# Patient Record
Sex: FEMALE | Race: WHITE | NOT HISPANIC OR LATINO | Employment: FULL TIME | URBAN - NONMETROPOLITAN AREA
[De-identification: names, ages, dates, MRNs, and addresses within clinical notes are randomized per-mention and may not be internally consistent; named-entity substitution may affect disease eponyms.]

---

## 2017-01-02 ENCOUNTER — OFFICE VISIT - GICH (OUTPATIENT)
Dept: FAMILY MEDICINE | Facility: OTHER | Age: 52
End: 2017-01-02

## 2017-01-02 ENCOUNTER — HISTORY (OUTPATIENT)
Dept: FAMILY MEDICINE | Facility: OTHER | Age: 52
End: 2017-01-02

## 2017-01-02 DIAGNOSIS — E11.9 TYPE 2 DIABETES MELLITUS WITHOUT COMPLICATIONS (H): ICD-10-CM

## 2017-01-02 DIAGNOSIS — M51.369 OTHER INTERVERTEBRAL DISC DEGENERATION, LUMBAR REGION: ICD-10-CM

## 2017-01-02 DIAGNOSIS — M25.562 PAIN IN LEFT KNEE: ICD-10-CM

## 2017-01-02 DIAGNOSIS — M25.561 PAIN IN RIGHT KNEE: ICD-10-CM

## 2017-01-02 DIAGNOSIS — J45.40 MODERATE PERSISTENT ASTHMA, UNCOMPLICATED: ICD-10-CM

## 2017-01-02 DIAGNOSIS — Z23 ENCOUNTER FOR IMMUNIZATION: ICD-10-CM

## 2017-01-02 LAB
ESTIMATED AVERAGE GLUCOSE: 123 MG/DL
HEMOGLOBIN A1C MONITORING (POCT) - HISTORICAL: 5.9 % (ref 4–6.2)

## 2017-01-03 ENCOUNTER — AMBULATORY - GICH (OUTPATIENT)
Dept: UROLOGY | Facility: OTHER | Age: 52
End: 2017-01-03

## 2017-01-18 ENCOUNTER — AMBULATORY - GICH (OUTPATIENT)
Dept: FAMILY MEDICINE | Facility: OTHER | Age: 52
End: 2017-01-18

## 2017-01-18 ENCOUNTER — HISTORY (OUTPATIENT)
Dept: FAMILY MEDICINE | Facility: OTHER | Age: 52
End: 2017-01-18

## 2017-01-18 DIAGNOSIS — J45.30 MILD PERSISTENT ASTHMA, UNCOMPLICATED: ICD-10-CM

## 2017-01-18 DIAGNOSIS — E11.9 TYPE 2 DIABETES MELLITUS WITHOUT COMPLICATIONS (H): ICD-10-CM

## 2017-01-18 DIAGNOSIS — K21.9 GASTRO-ESOPHAGEAL REFLUX DISEASE WITHOUT ESOPHAGITIS: ICD-10-CM

## 2017-01-18 DIAGNOSIS — Z01.818 ENCOUNTER FOR OTHER PREPROCEDURAL EXAMINATION: ICD-10-CM

## 2017-01-18 DIAGNOSIS — I10 ESSENTIAL (PRIMARY) HYPERTENSION: ICD-10-CM

## 2017-01-19 ENCOUNTER — COMMUNICATION - GICH (OUTPATIENT)
Dept: FAMILY MEDICINE | Facility: OTHER | Age: 52
End: 2017-01-19

## 2017-01-19 DIAGNOSIS — M54.9 DORSALGIA: ICD-10-CM

## 2017-01-19 DIAGNOSIS — R31.9 HEMATURIA: ICD-10-CM

## 2017-01-31 ENCOUNTER — COMMUNICATION - GICH (OUTPATIENT)
Dept: FAMILY MEDICINE | Facility: OTHER | Age: 52
End: 2017-01-31

## 2017-01-31 DIAGNOSIS — I10 ESSENTIAL (PRIMARY) HYPERTENSION: ICD-10-CM

## 2017-02-02 ENCOUNTER — AMBULATORY - GICH (OUTPATIENT)
Dept: SCHEDULING | Facility: OTHER | Age: 52
End: 2017-02-02

## 2017-02-07 ENCOUNTER — COMMUNICATION - GICH (OUTPATIENT)
Dept: FAMILY MEDICINE | Facility: OTHER | Age: 52
End: 2017-02-07

## 2017-02-07 DIAGNOSIS — M54.9 DORSALGIA: ICD-10-CM

## 2017-02-09 ENCOUNTER — COMMUNICATION - GICH (OUTPATIENT)
Dept: FAMILY MEDICINE | Facility: OTHER | Age: 52
End: 2017-02-09

## 2017-02-09 DIAGNOSIS — E11.9 TYPE 2 DIABETES MELLITUS WITHOUT COMPLICATIONS (H): ICD-10-CM

## 2017-03-11 ENCOUNTER — COMMUNICATION - GICH (OUTPATIENT)
Dept: FAMILY MEDICINE | Facility: OTHER | Age: 52
End: 2017-03-11

## 2017-03-11 DIAGNOSIS — E11.9 TYPE 2 DIABETES MELLITUS WITHOUT COMPLICATIONS (H): ICD-10-CM

## 2017-03-11 DIAGNOSIS — E78.5 HYPERLIPIDEMIA: ICD-10-CM

## 2017-04-10 ENCOUNTER — COMMUNICATION - GICH (OUTPATIENT)
Dept: FAMILY MEDICINE | Facility: OTHER | Age: 52
End: 2017-04-10

## 2017-04-10 DIAGNOSIS — I10 ESSENTIAL (PRIMARY) HYPERTENSION: ICD-10-CM

## 2017-04-20 ENCOUNTER — COMMUNICATION - GICH (OUTPATIENT)
Dept: FAMILY MEDICINE | Facility: OTHER | Age: 52
End: 2017-04-20

## 2017-04-20 DIAGNOSIS — M51.369 OTHER INTERVERTEBRAL DISC DEGENERATION, LUMBAR REGION: ICD-10-CM

## 2017-04-27 ENCOUNTER — AMBULATORY - GICH (OUTPATIENT)
Dept: FAMILY MEDICINE | Facility: OTHER | Age: 52
End: 2017-04-27

## 2017-05-01 ENCOUNTER — AMBULATORY - GICH (OUTPATIENT)
Dept: FAMILY MEDICINE | Facility: OTHER | Age: 52
End: 2017-05-01

## 2017-05-01 ENCOUNTER — HISTORY (OUTPATIENT)
Dept: FAMILY MEDICINE | Facility: OTHER | Age: 52
End: 2017-05-01

## 2017-05-01 DIAGNOSIS — E11.9 TYPE 2 DIABETES MELLITUS WITHOUT COMPLICATIONS (H): ICD-10-CM

## 2017-05-01 DIAGNOSIS — Z01.818 ENCOUNTER FOR OTHER PREPROCEDURAL EXAMINATION: ICD-10-CM

## 2017-05-01 DIAGNOSIS — M51.369 OTHER INTERVERTEBRAL DISC DEGENERATION, LUMBAR REGION: ICD-10-CM

## 2017-05-01 DIAGNOSIS — J45.40 MODERATE PERSISTENT ASTHMA, UNCOMPLICATED: ICD-10-CM

## 2017-05-01 DIAGNOSIS — I10 ESSENTIAL (PRIMARY) HYPERTENSION: ICD-10-CM

## 2017-05-01 DIAGNOSIS — J30.1 ALLERGIC RHINITIS DUE TO POLLEN: ICD-10-CM

## 2017-05-01 DIAGNOSIS — F41.1 GENERALIZED ANXIETY DISORDER: ICD-10-CM

## 2017-05-01 DIAGNOSIS — E78.5 HYPERLIPIDEMIA: ICD-10-CM

## 2017-05-01 LAB
A/G RATIO - HISTORICAL: 1.5 (ref 1–2)
ALBUMIN SERPL-MCNC: 4.1 G/DL (ref 3.5–5.7)
ALP SERPL-CCNC: 82 IU/L (ref 34–104)
ALT (SGPT) - HISTORICAL: 12 IU/L (ref 7–52)
ANION GAP - HISTORICAL: 10 (ref 5–18)
AST SERPL-CCNC: 13 IU/L (ref 13–39)
BILIRUB SERPL-MCNC: 0.3 MG/DL (ref 0.3–1)
BUN SERPL-MCNC: 12 MG/DL (ref 7–25)
BUN/CREAT RATIO - HISTORICAL: 15
CALCIUM SERPL-MCNC: 9.9 MG/DL (ref 8.6–10.3)
CHLORIDE SERPLBLD-SCNC: 104 MMOL/L (ref 98–107)
CHOL/HDL RATIO - HISTORICAL: 3.86
CHOLESTEROL TOTAL: 139 MG/DL
CO2 SERPL-SCNC: 26 MMOL/L (ref 21–31)
CREAT SERPL-MCNC: 0.82 MG/DL (ref 0.7–1.3)
GFR IF NOT AFRICAN AMERICAN - HISTORICAL: >60 ML/MIN/1.73M2
GLOBULIN - HISTORICAL: 2.8 G/DL (ref 2–3.7)
GLUCOSE SERPL-MCNC: 99 MG/DL (ref 70–105)
HDLC SERPL-MCNC: 36 MG/DL (ref 23–92)
LDLC SERPL CALC-MCNC: 70 MG/DL
NON-HDL CHOLESTEROL - HISTORICAL: 103 MG/DL
PATIENT STATUS - HISTORICAL: ABNORMAL
POTASSIUM SERPL-SCNC: 3.9 MMOL/L (ref 3.5–5.1)
PROT SERPL-MCNC: 6.9 G/DL (ref 6.4–8.9)
SODIUM SERPL-SCNC: 140 MMOL/L (ref 133–143)
TRIGL SERPL-MCNC: 163 MG/DL

## 2017-05-01 ASSESSMENT — ANXIETY QUESTIONNAIRES
7. FEELING AFRAID AS IF SOMETHING AWFUL MIGHT HAPPEN: NOT AT ALL
6. BECOMING EASILY ANNOYED OR IRRITABLE: NOT AT ALL
5. BEING SO RESTLESS THAT IT IS HARD TO SIT STILL: NOT AT ALL
1. FEELING NERVOUS, ANXIOUS, OR ON EDGE: NOT AT ALL
3. WORRYING TOO MUCH ABOUT DIFFERENT THINGS: NOT AT ALL
4. TROUBLE RELAXING: NOT AT ALL
2. NOT BEING ABLE TO STOP OR CONTROL WORRYING: NOT AT ALL
GAD7 TOTAL SCORE: 0

## 2017-05-06 ENCOUNTER — COMMUNICATION - GICH (OUTPATIENT)
Dept: FAMILY MEDICINE | Facility: OTHER | Age: 52
End: 2017-05-06

## 2017-06-05 ENCOUNTER — COMMUNICATION - GICH (OUTPATIENT)
Dept: FAMILY MEDICINE | Facility: OTHER | Age: 52
End: 2017-06-05

## 2017-06-05 DIAGNOSIS — E78.5 HYPERLIPIDEMIA: ICD-10-CM

## 2017-06-06 ENCOUNTER — COMMUNICATION - GICH (OUTPATIENT)
Dept: FAMILY MEDICINE | Facility: OTHER | Age: 52
End: 2017-06-06

## 2017-06-06 ENCOUNTER — HOSPITAL ENCOUNTER (OUTPATIENT)
Dept: RADIOLOGY | Facility: OTHER | Age: 52
End: 2017-06-06

## 2017-06-06 ENCOUNTER — AMBULATORY - GICH (OUTPATIENT)
Dept: RADIOLOGY | Facility: OTHER | Age: 52
End: 2017-06-06

## 2017-06-06 DIAGNOSIS — M79.661 PAIN OF RIGHT LOWER LEG: ICD-10-CM

## 2017-06-06 DIAGNOSIS — M25.471 EFFUSION OF RIGHT ANKLE: ICD-10-CM

## 2017-06-13 ENCOUNTER — AMBULATORY - GICH (OUTPATIENT)
Dept: SCHEDULING | Facility: OTHER | Age: 52
End: 2017-06-13

## 2017-06-17 ENCOUNTER — COMMUNICATION - GICH (OUTPATIENT)
Dept: FAMILY MEDICINE | Facility: OTHER | Age: 52
End: 2017-06-17

## 2017-06-17 DIAGNOSIS — M51.369 OTHER INTERVERTEBRAL DISC DEGENERATION, LUMBAR REGION: ICD-10-CM

## 2017-06-17 DIAGNOSIS — E11.9 TYPE 2 DIABETES MELLITUS WITHOUT COMPLICATIONS (H): ICD-10-CM

## 2017-06-22 ENCOUNTER — COMMUNICATION - GICH (OUTPATIENT)
Dept: FAMILY MEDICINE | Facility: OTHER | Age: 52
End: 2017-06-22

## 2017-07-03 ENCOUNTER — OFFICE VISIT - GICH (OUTPATIENT)
Dept: FAMILY MEDICINE | Facility: OTHER | Age: 52
End: 2017-07-03

## 2017-07-03 ENCOUNTER — HISTORY (OUTPATIENT)
Dept: FAMILY MEDICINE | Facility: OTHER | Age: 52
End: 2017-07-03

## 2017-07-03 DIAGNOSIS — G89.29 OTHER CHRONIC PAIN: ICD-10-CM

## 2017-07-03 DIAGNOSIS — J45.30 MILD PERSISTENT ASTHMA, UNCOMPLICATED: ICD-10-CM

## 2017-07-03 DIAGNOSIS — M25.561 PAIN IN RIGHT KNEE: ICD-10-CM

## 2017-07-03 DIAGNOSIS — I10 ESSENTIAL (PRIMARY) HYPERTENSION: ICD-10-CM

## 2017-07-03 DIAGNOSIS — E11.9 TYPE 2 DIABETES MELLITUS WITHOUT COMPLICATIONS (H): ICD-10-CM

## 2017-07-03 DIAGNOSIS — Z01.818 ENCOUNTER FOR OTHER PREPROCEDURAL EXAMINATION: ICD-10-CM

## 2017-07-03 LAB
ESTIMATED AVERAGE GLUCOSE: 100 MG/DL
HEMOGLOBIN A1C MONITORING (POCT) - HISTORICAL: 5.1 % (ref 4–6.2)

## 2017-07-18 ENCOUNTER — COMMUNICATION - GICH (OUTPATIENT)
Dept: FAMILY MEDICINE | Facility: OTHER | Age: 52
End: 2017-07-18

## 2017-07-18 DIAGNOSIS — F41.1 GENERALIZED ANXIETY DISORDER: ICD-10-CM

## 2017-08-01 ENCOUNTER — COMMUNICATION - GICH (OUTPATIENT)
Dept: FAMILY MEDICINE | Facility: OTHER | Age: 52
End: 2017-08-01

## 2017-08-07 ENCOUNTER — COMMUNICATION - GICH (OUTPATIENT)
Dept: FAMILY MEDICINE | Facility: OTHER | Age: 52
End: 2017-08-07

## 2017-08-16 ENCOUNTER — OFFICE VISIT - GICH (OUTPATIENT)
Dept: FAMILY MEDICINE | Facility: OTHER | Age: 52
End: 2017-08-16

## 2017-08-16 ENCOUNTER — HISTORY (OUTPATIENT)
Dept: FAMILY MEDICINE | Facility: OTHER | Age: 52
End: 2017-08-16

## 2017-08-16 DIAGNOSIS — F17.200 NICOTINE DEPENDENCE, UNCOMPLICATED: ICD-10-CM

## 2017-08-16 DIAGNOSIS — Z01.818 ENCOUNTER FOR OTHER PREPROCEDURAL EXAMINATION: ICD-10-CM

## 2017-08-16 DIAGNOSIS — M25.562 PAIN IN LEFT KNEE: ICD-10-CM

## 2017-08-16 DIAGNOSIS — Z12.31 ENCOUNTER FOR SCREENING MAMMOGRAM FOR MALIGNANT NEOPLASM OF BREAST: ICD-10-CM

## 2017-08-16 DIAGNOSIS — E11.9 TYPE 2 DIABETES MELLITUS WITHOUT COMPLICATIONS (H): ICD-10-CM

## 2017-08-16 DIAGNOSIS — I10 ESSENTIAL (PRIMARY) HYPERTENSION: ICD-10-CM

## 2017-08-16 LAB
ANION GAP - HISTORICAL: 16 (ref 5–18)
BUN SERPL-MCNC: 11 MG/DL (ref 7–25)
BUN/CREAT RATIO - HISTORICAL: 15
CALCIUM SERPL-MCNC: 9.9 MG/DL (ref 8.6–10.3)
CHLORIDE SERPLBLD-SCNC: 103 MMOL/L (ref 98–107)
CO2 SERPL-SCNC: 25 MMOL/L (ref 21–31)
CREAT SERPL-MCNC: 0.72 MG/DL (ref 0.7–1.3)
ERYTHROCYTE [DISTWIDTH] IN BLOOD BY AUTOMATED COUNT: 13.1 % (ref 11.5–15.5)
GFR IF NOT AFRICAN AMERICAN - HISTORICAL: >60 ML/MIN/1.73M2
GLUCOSE SERPL-MCNC: 105 MG/DL (ref 70–105)
HCT VFR BLD AUTO: 42.5 % (ref 33–51)
HEMOGLOBIN: 15.3 G/DL (ref 12–16)
MCH RBC QN AUTO: 34.2 PG (ref 26–34)
MCHC RBC AUTO-ENTMCNC: 36 G/DL (ref 32–36)
MCV RBC AUTO: 95 FL (ref 80–100)
PLATELET # BLD AUTO: 319 THOU/CU MM (ref 140–440)
PMV BLD: 8.8 FL (ref 6.5–11)
POTASSIUM SERPL-SCNC: 3.8 MMOL/L (ref 3.5–5.1)
RED BLOOD COUNT - HISTORICAL: 4.47 MIL/CU MM (ref 4–5.2)
SODIUM SERPL-SCNC: 144 MMOL/L (ref 133–143)
WHITE BLOOD COUNT - HISTORICAL: 8.9 THOU/CU MM (ref 4.5–11)

## 2017-09-07 ENCOUNTER — HISTORY (OUTPATIENT)
Dept: RADIOLOGY | Facility: OTHER | Age: 52
End: 2017-09-07

## 2017-09-07 ENCOUNTER — HOSPITAL ENCOUNTER (OUTPATIENT)
Dept: RADIOLOGY | Facility: OTHER | Age: 52
Discharge: HOME | End: 2017-09-07
Attending: FAMILY MEDICINE

## 2017-09-07 DIAGNOSIS — Z12.31 ENCOUNTER FOR SCREENING MAMMOGRAM FOR MALIGNANT NEOPLASM OF BREAST: ICD-10-CM

## 2017-09-08 ENCOUNTER — AMBULATORY - GICH (OUTPATIENT)
Dept: RADIOLOGY | Facility: OTHER | Age: 52
End: 2017-09-08

## 2017-09-08 DIAGNOSIS — R92.8 OTHER ABNORMAL AND INCONCLUSIVE FINDINGS ON DIAGNOSTIC IMAGING OF BREAST: ICD-10-CM

## 2017-09-11 ENCOUNTER — HOSPITAL ENCOUNTER (OUTPATIENT)
Dept: RADIOLOGY | Facility: OTHER | Age: 52
End: 2017-09-11
Attending: FAMILY MEDICINE

## 2017-09-11 ENCOUNTER — HISTORY (OUTPATIENT)
Dept: RADIOLOGY | Facility: OTHER | Age: 52
End: 2017-09-11

## 2017-09-11 DIAGNOSIS — R92.8 OTHER ABNORMAL AND INCONCLUSIVE FINDINGS ON DIAGNOSTIC IMAGING OF BREAST: ICD-10-CM

## 2017-09-17 ENCOUNTER — HISTORY (OUTPATIENT)
Dept: EMERGENCY MEDICINE | Facility: OTHER | Age: 52
End: 2017-09-17

## 2017-09-30 ENCOUNTER — COMMUNICATION - GICH (OUTPATIENT)
Dept: FAMILY MEDICINE | Facility: OTHER | Age: 52
End: 2017-09-30

## 2017-09-30 DIAGNOSIS — J45.40 MODERATE PERSISTENT ASTHMA, UNCOMPLICATED: ICD-10-CM

## 2017-10-13 ENCOUNTER — COMMUNICATION - GICH (OUTPATIENT)
Dept: FAMILY MEDICINE | Facility: OTHER | Age: 52
End: 2017-10-13

## 2017-10-16 ENCOUNTER — HISTORY (OUTPATIENT)
Dept: FAMILY MEDICINE | Facility: OTHER | Age: 52
End: 2017-10-16

## 2017-10-16 ENCOUNTER — HOSPITAL ENCOUNTER (OUTPATIENT)
Dept: RADIOLOGY | Facility: OTHER | Age: 52
End: 2017-10-16
Attending: NURSE PRACTITIONER

## 2017-10-16 ENCOUNTER — OFFICE VISIT - GICH (OUTPATIENT)
Dept: FAMILY MEDICINE | Facility: OTHER | Age: 52
End: 2017-10-16

## 2017-10-16 DIAGNOSIS — J06.9 ACUTE UPPER RESPIRATORY INFECTION: ICD-10-CM

## 2017-10-16 DIAGNOSIS — R05.9 COUGH: ICD-10-CM

## 2017-10-16 DIAGNOSIS — B97.89 OTHER VIRAL AGENTS AS THE CAUSE OF DISEASES CLASSIFIED ELSEWHERE: ICD-10-CM

## 2017-10-30 ENCOUNTER — AMBULATORY - GICH (OUTPATIENT)
Dept: SCHEDULING | Facility: OTHER | Age: 52
End: 2017-10-30

## 2017-10-30 ENCOUNTER — COMMUNICATION - GICH (OUTPATIENT)
Dept: FAMILY MEDICINE | Facility: OTHER | Age: 52
End: 2017-10-30

## 2017-11-03 ENCOUNTER — COMMUNICATION - GICH (OUTPATIENT)
Dept: FAMILY MEDICINE | Facility: OTHER | Age: 52
End: 2017-11-03

## 2017-11-10 ENCOUNTER — AMBULATORY - GICH (OUTPATIENT)
Dept: FAMILY MEDICINE | Facility: OTHER | Age: 52
End: 2017-11-10

## 2017-11-13 ENCOUNTER — COMMUNICATION - GICH (OUTPATIENT)
Dept: FAMILY MEDICINE | Facility: OTHER | Age: 52
End: 2017-11-13

## 2017-11-13 DIAGNOSIS — I10 ESSENTIAL (PRIMARY) HYPERTENSION: ICD-10-CM

## 2017-11-14 ENCOUNTER — OFFICE VISIT - GICH (OUTPATIENT)
Dept: FAMILY MEDICINE | Facility: OTHER | Age: 52
End: 2017-11-14

## 2017-11-14 ENCOUNTER — HISTORY (OUTPATIENT)
Dept: UROLOGY | Facility: OTHER | Age: 52
End: 2017-11-14

## 2017-11-14 ENCOUNTER — HOSPITAL ENCOUNTER (OUTPATIENT)
Dept: RADIOLOGY | Facility: OTHER | Age: 52
End: 2017-11-14
Attending: FAMILY MEDICINE

## 2017-11-14 ENCOUNTER — AMBULATORY - GICH (OUTPATIENT)
Dept: UROLOGY | Facility: OTHER | Age: 52
End: 2017-11-14

## 2017-11-14 DIAGNOSIS — M25.561 PAIN IN RIGHT KNEE: ICD-10-CM

## 2017-11-14 ASSESSMENT — ANXIETY QUESTIONNAIRES
7. FEELING AFRAID AS IF SOMETHING AWFUL MIGHT HAPPEN: NOT AT ALL
1. FEELING NERVOUS, ANXIOUS, OR ON EDGE: NOT AT ALL
5. BEING SO RESTLESS THAT IT IS HARD TO SIT STILL: NOT AT ALL
6. BECOMING EASILY ANNOYED OR IRRITABLE: NOT AT ALL
GAD7 TOTAL SCORE: 0
3. WORRYING TOO MUCH ABOUT DIFFERENT THINGS: NOT AT ALL
4. TROUBLE RELAXING: NOT AT ALL
2. NOT BEING ABLE TO STOP OR CONTROL WORRYING: NOT AT ALL

## 2017-12-27 NOTE — PROGRESS NOTES
Patient Information     Patient Name MRN Sex Alejandra Hoffmann [id number] Female 1965      Progress Notes by Mel Avelar R.T. (ARRT) at 2017  2:57 PM     Author:  Mel Avelar R.T. (ARRT) Service:  (none) Author Type:  (none)     Filed:  2017  2:57 PM Date of Service:  2017  2:57 PM Status:  Signed     :  Mel Avelar R.T. (ARRT) ([facility] - Registered Radiologic Technologist)            Falls Risk Criteria:    Age 65 and older or under age 4        Sensory deficits    Poor vision    Use of ambulatory aides    Impaired judgment    Unable to walk independently    Meets High Risk criteria for falls:  no

## 2017-12-27 NOTE — PROGRESS NOTES
Patient Information     Patient Name MRN Sex Alejandra Hoffmann [id number] Female 1965      Progress Notes by Radha Mckinley MD at 7/3/2017  3:04 PM     Author:  Radha Mckinley MD Service:  (none) Author Type:  Physician     Filed:  7/3/2017  3:37 PM Encounter Date:  7/3/2017 Status:  Signed     :  Radha Mckinley MD (Physician)              PREOPERATIVE CLEARANCE  Date of Exam: 7/3/2017    Nursing Notes:   Shannan Colby  7/3/2017  3:03 PM  Addendum  This patient presents today for a Preoperative exam for this procedure: Rt Knee-lateral release  Date of Surgery: 17   Surgeon:  Greg  Place of surgery: [facility]  Facility:  Fax:  [phone]    Shannan Colby ....................  7/3/2017   2:56 PM           HPI:  Alejandra Villegas is a 51 y.o. Female with recent right knee arthroscopy in May and persistent pain. After multiple visits to that surgeon she is frustrated and went to see Dr Garza for a second opinion and he will do this procedure to improve mobility and reduce pain. Plan is to have left knee done in the future.     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     Seasonal allergic rhinitis J30.2     ANXIETY F41.1     CLAUSTROPHOBIA F40.298     Mild persistent asthma without complication J45.30     DYSLIPIDEMIA E78.5     Degenerative disc disease at L5-S1 level M51.36     HIDRADENITIS SUPPURATIVA L73.2     Diabetes mellitus type 2, controlled, without complications (HC) E11.9     OBESITY E66.9     PULMONARY NODULE J98.4     TOBACCO ABUSE F17.200     MENOPAUSE-RELATED VASOMOTOR SYMPTOMS N95.1     ABDOMINAL PAIN, LEFT LOWER QUADRANT R10.32     Benign paroxysmal positional vertigo H81.10     Pyelonephritis due to Escherichia coli N12, B96.20     Alopecia areata L63.9     Urge incontinence s/p Interstim placement in  N39.41     HTN (hypertension) I10     Pain  management contract broken Z91.138     Gastroesophageal reflux disease K21.9      Histories:  Past Medical History:     Diagnosis  Date     ALLERGIC RHINITIS 9/27/2011     ANXIETY      ASTHMA, INTERMITTENT      BACK PAIN, CHRONIC      CLAUSTROPHOBIA      DIABETES MELLITUS, TYPE II 7/1/2007     DYSLIPIDEMIA      EUSTACHIAN TUBE DYSFUNCTION 2/27/2012     HIDRADENITIS SUPPURATIVA      Hx of pregnancy     Childbirth x4       KIDNEY DISEASE 8/8/2008    Kidney disease GFR 87.      Menorrhagia     Menorrhagia       Nicotine abuse      Obesity (BMI 30.0-34.9) 07/01/2007    Obesity  Body-mass index over 30       PULMONARY NODULE 8/1/2007    Pulmonary nodules, stable on follow-up chest CT 12/08.  No further imaging recommended.      Rheumatoid arthritis (HC) 2/27/2012      Past Surgical History:      Procedure  Laterality Date     CARPAL TUNNEL RELEASE Bilateral 02/02/2017     CHOLECYSTECTOMY  06/07    Emergency tracheotomy following failed intubation for laparoscopic cholecystectomy.       DILATION AND CURETTAGE  09/06     ENDOMETRIAL ABLATION  09/06     FOREARM/WRIST SURGERY  2011     Left wrist surgery, Dr. Torres       HERNIA REPAIR  2007     Incisoinal hernia repair       HYSTERECTOMY  2010       Interstim stage 2  01/06/14    Dr. Pb GIRON       OOPHORECTOMY  2010      Left oophorectomy, Dr. Thorpe       SALPINGO-OOPHORECTOMY  06/99      Right salpingo-oophorectomy for hemorrhagic corpus luteum cyst         TRACHEOSTOMY  2007    emergency following failed intubation during cholecystectomy       TYMPANOSTOMY  08/06     PE tube placement       Social History     Social History        Marital status:       Spouse name: N/A     Number of children:  N/A     Years of education:  N/A     Occupational History      Not on file.     Social History Main Topics        Smoking status:  Current Every Day Smoker     Packs/day: 0.50     Years: 32.00     Types: Cigarettes     Smokeless tobacco:  Never Used     Alcohol use   No     Drug use:  No     Sexual activity:  Not Currently     Other Topics  Concern     Not on file      Social History Narrative      four times and now . She is unemployed.    Lives with her oldest son(he lives with her).    4 sons, 1 son lives with her others are all in the area.            Obstetric History     No data available     Family History       Problem   Relation Age of Onset     Arthritis  Mother      Rheumatoid       Cancer  Mother       lung and uterine cancer       Heart Disease  Father      CAD, CABG, peripheral vascular dise       Thyroid Disease  Father       hyperlipidemia       Other  Father      djd       Asthma  Brother      Asthma  Sister      Psychiatric illness  Sister      Anxiety       Other  Son      x2 back surgeries        Allergies: Adhesives [adhesive]; Amoxicillin; Bee pollen; Codeine; and Folic acid   Latex Allergy: no    Current Medications:  Current Outpatient Rx       Medication  Sig Dispense Refill     albuterol HFA (PROAIR HFA) 90 mcg/actuation inhaler Inhale 2 Puffs by mouth every 4 hours if needed. 3 Inhaler 2     atorvastatin (LIPITOR) 40 mg tablet Take 1.5 tablets by mouth at bedtime. 135 tablet 4     cyclobenzaprine (FLEXERIL) 10 mg tablet Take 1 tablet by mouth at bedtime if needed for Muscle Spasm. 30 tablet 5     famotidine (PEPCID) 20 mg tablet Take 1 tablet by mouth once daily if needed. 30 tablet 11     gabapentin (NEURONTIN) 800 mg tablet Take 1 tablet by mouth 3 times daily. 270 tablet 4     ibuprofen (ADVIL; MOTRIN) 800 mg tablet Take 1 tablet by mouth 3 times daily if needed. 90 tablet 2     lidocaine 5 % topical gel 2 g 4 times daily if needed. Apply  topically to affected area(s). 722.24 g 3     lisinopril (PRINIVIL; ZESTRIL) 10 mg tablet TAKE 1 TABLET BY MOUTH EVERY DAY 90 tablet 3     loratadine (CLARITIN) 10 mg tablet Take 1 tablet by mouth once daily if needed for Allergy Symptoms. 90 tablet 1     metFORMIN (GLUCOPHAGE) 500 mg tablet Take 1  "tablet by mouth once daily with a meal. 90 tablet 4     PARoxetine (PAXIL) 10 mg tablet Take 1 tablet by mouth every morning. 90 tablet 3     Medications have been reviewed by me and are current to the best of my knowledge and ability.    Recent use of: no recent use of aspirin (ASA), NSAIDS or steroids    HABITS:   Social History      Substance Use Topics        Smoking status:  Current Every Day Smoker     Packs/day: 0.50     Years: 32.00     Types: Cigarettes     Smokeless tobacco:  Never Used     Alcohol use  No   Tetanus up to date yes    Proposed anesthesia: Per surgeon and anesthesia.  Anesthesia Complications: None  History of abnormal bleeding : None  History of Blood Transfusions: No    Health Care Directive or Living Will: no    REVIEW OF SYSTEMS:  REVIEW OF SYSTEMS:  A comprehensive review of systems was negative except for items noted in HPI/Subjective.      Preoperative Evaluation: Obstructive Sleep Apnea screening    S: Snore -  Do you snore loudly? (louder than talking or loud enough to be heard through closed doors)(NO)  T: Tired - Do you often feel tired, fatigued, or sleepy during the daytime?(NO)  O: Observed - Has anyone ever observed you stop breathing during your sleep?(NO)  P: Pressure - Do you have or are you being treated for high blood pressure?(YES)  B: BMI - BMI greater than 35kg/m2?(NO)  A: Age - Age over 50 years old?(NO)  N: Neck - Neck circumference greater than 40 cm?(NO)  G: Gender - Gender: Male?(NO)    Total number of \"YES\" responses:  1    Scoring: Low risk of CATALINO 0-2  At Risk of CATALINO: >3 High Risk of CATALINO: 5-8        EXAM:   /66  Pulse 62  Resp 14  Ht 1.664 m (5' 5.5\")  Wt 79.3 kg (174 lb 12.8 oz)  BMI 28.65 kg/m2 Body mass index is 28.65 kg/(m^2).  General Appearance: Pleasant, alert, appropriate appearance for age. No acute distress  Ear Exam: Normal TM's bilaterally. Normal auditory canals and external ears. Non-tender.  OroPharynx Exam: Dental hygiene adequate. " Normal buccal mucosa. Normal pharynx.  Neck Exam: Supple, no masses or nodes.  Thyroid Exam: No nodules or enlargement.  Cardiovascular Exam: Regular rate and rhythm. S1, S2, no murmur, click, gallop, or rubs.  Lungs: Intermittent inspiratory wheeze that cleared with cough.   Musculoskeletal Exam: Back is straight and non-tender, full ROM of upper and lower extremities.  Skin: Normal.  Psychiatric Exam: Alert and oriented, appropriate affect.    DIAGNOSTICS:   1. EKG: EKG FINDINGS - Not indicated  2. CXR: Not indicated.  3. Labs:   Results for orders placed or performed in visit on 07/03/17      Hgb A1c      Result  Value Ref Range    HEMOGLOBIN A1C MONITORING (POCT) 5.1 4.0 - 6.2 %    ESTIMATED AVERAGE GLUCOSE  100 mg/dL   I have personally reviewed the labs listed above.    4. Pre-op urine for pregnancy (for 12 yrs and older or menstruating): not applicable-hysterectomy    IMPRESSION:   1. Preop examination    2. Chronic pain of right knee    3. Type 2 diabetes mellitus without complication, without long-term current use of insulin (HC)    4. Mild persistent asthma without complication    5. HTN (hypertension)         For above listed surgery and anesthesia:   Patient is low risk for perioperative complications.    RECOMMENDATIONS: proceed without further diagnostic evaluation and resume all medications post-operative or per surgeon  Smoking cessation advised.  Electronically Signed by:    Radha Mckinley MD  3:23 PM 7/3/2017

## 2017-12-28 NOTE — TELEPHONE ENCOUNTER
Patient Information     Patient Name MRN Alejandra Acuna [id number] Female 1965      Telephone Encounter by Emily Cervantes at 2017  3:33 PM     Author:  Emily Cervantes Service:  (none) Author Type:  (none)     Filed:  2017  3:33 PM Encounter Date:  10/30/2017 Status:  Signed     :  Emily Cervantes            Left message to call back.  Emily Cervantes LPN ....................  2017   3:33 PM

## 2017-12-28 NOTE — TELEPHONE ENCOUNTER
Patient Information     Patient Name MRN Alejandra Acuna [id number] Female 1965      Telephone Encounter by Diana Boucher RN at 2017 11:11 AM     Author:  Diana Boucher RN Service:  (none) Author Type:  NURS- Registered Nurse     Filed:  2017 11:13 AM Encounter Date:  2017 Status:  Signed     :  Diana Boucher RN (NURS- Registered Nurse)            Refill request inappropriate. Filled 17 90 x 4. Pharmacy alerted. Unable to complete prescription refill per RN Medication Refill Policy.................... Diana Boucher RN ....................  2017   11:13 AM    Prescribing Provider: Dipak Tsai MD                Order Date: 2017  Ordered by: DIPAK TSAI  Medication:metFORMIN (GLUCOPHAGE) 500 mg tablet    Qty:90 tablet   Ref:4  Start:2017    End:              Route:Oral                  SYMONE:No   Class:eRx    Sig:Take 1 tablet by mouth once daily with a meal.    Pharmacy:[facility] [id number] [place] [address]   AT [address]              OF [facility] [address] [facility] [phone]

## 2017-12-28 NOTE — TELEPHONE ENCOUNTER
Patient Information     Patient Name MRN Sex Alejandra Hoffmann [id number] Female 1965      Telephone Encounter by Landy Reardon at 10/13/2017  3:10 PM     Author:  Landy Reardon Service:  (none) Author Type:  (none)     Filed:  10/13/2017  3:45 PM Encounter Date:  10/13/2017 Status:  Signed     :  Landy Reardon            Received fax from [facility] pharmacy  Needing a letter of attestion from [facility] . In your in box .  Landy Reardon LPN ....................10/13/2017  3:45 PM

## 2017-12-28 NOTE — TELEPHONE ENCOUNTER
Patient Information     Patient Name MRN Sex Alejandra Hoffmann [id number] Female 1965      Telephone Encounter by Yuliana Fagan MD at 2017  3:17 PM     Author:  Yuliana Fagan MD Service:  (none) Author Type:  Physician     Filed:  2017  3:22 PM Encounter Date:  10/30/2017 Status:  Signed     :  Yuliana Fagan MD (Physician)            This patient has not seen me since 2015.  I have already addressed their requests on multiple occasions, last just on 10/31/17 to verify that I wrote this prescription.  I REFUSE TO FILL THIS FOR PATIENT ANY FURTHER OR TO DO FURTHER PAPERWORK AS IT HAS ALREADY BEEN DONE MULTIPLE TIMES.  She would need to be seen.    Yuliana Fagan MD ....................  2017   3:18 PM

## 2017-12-28 NOTE — TELEPHONE ENCOUNTER
Patient Information     Patient Name MRN Sex Alejandra Hoffmann [id number] Female 1965      Telephone Encounter by Ced Arenas MD at 2017 12:38 PM     Author:  Ced Arenas MD Service:  (none) Author Type:  Physician     Filed:  2017 12:39 PM Encounter Date:  2017 Status:  Signed     :  Ced Arenas MD (Physician)            PA should be done by prescribing physician.  Ced Arenas MD ....................  2017   12:39 PM

## 2017-12-28 NOTE — PROGRESS NOTES
Patient Information     Patient Name MRN Alejandra Acuna [id number] Female 1965      Progress Notes by Julia Cullen NP at 10/16/2017  4:00 PM     Author:  Julia Cullen NP Service:  (none) Author Type:  PHYS- Nurse Practitioner     Filed:  10/16/2017  5:16 PM Encounter Date:  10/16/2017 Status:  Signed     :  Julia Cullen NP (PHYS- Nurse Practitioner)            Nursing Notes:   Belinda Mclain  10/16/2017  4:24 PM  Signed  Patient presents to the clinic for white-yellow phlegm producing cough and chest congestion x1 week. Patient reports having pneumonia last month.  Belinda URIAS CMA.......10/16/2017..4:11 PM    SUBJECTIVE:    Alejandra Villegas is a 51 y.o. female who presents for cough for 1 week.     Cough   This is a new problem. The current episode started in the past 7 days. The problem has been unchanged. The cough is non-productive. Associated symptoms include nasal congestion, postnasal drip, rhinorrhea and a sore throat. Pertinent negatives include no chills, ear congestion, ear pain, fever, headaches, myalgias, rash, shortness of breath, sweats, weight loss or wheezing. The symptoms are aggravated by lying down. Risk factors for lung disease include smoking/tobacco exposure. She has tried a beta-agonist inhaler, OTC cough suppressant and rest for the symptoms. The treatment provided no relief. Her past medical history is significant for pneumonia. There is no history of asthma, bronchitis or environmental allergies. Recent pneumonia in 2017       Current Outpatient Prescriptions on File Prior to Visit       Medication  Sig Dispense Refill     albuterol HFA (PROAIR HFA) 90 mcg/actuation inhaler Inhale 2 Puffs by mouth every 4 hours if needed. 3 Inhaler 2     atorvastatin (LIPITOR) 40 mg tablet Take 1.5 tablets by mouth at bedtime. 135 tablet 4     buPROPion (WELLBUTRIN SR) 150 mg Sustained-Release tablet Take 1 tablet by mouth 2 times daily. 60 tablet 3      famotidine (PEPCID) 20 mg tablet Take 1 tablet by mouth once daily if needed. 30 tablet 11     gabapentin (NEURONTIN) 800 mg tablet Take 1 tablet by mouth 3 times daily. 270 tablet 4     ibuprofen (ADVIL; MOTRIN) 800 mg tablet Take 1 tablet by mouth 3 times daily if needed. 90 tablet 2     lisinopril (PRINIVIL; ZESTRIL) 10 mg tablet TAKE 1 TABLET BY MOUTH EVERY DAY 90 tablet 3     metFORMIN (GLUCOPHAGE) 500 mg tablet Take 1 tablet by mouth once daily with a meal. 90 tablet 4     oxyCODONE (ROXICODONE) 5 mg immediate release tablet Take 1 tablet by mouth every 6 hours if needed  for Pain 60 tablet 0     PARoxetine (PAXIL) 10 mg tablet Take 1 tablet by mouth every morning. 90 tablet 3     No current facility-administered medications on file prior to visit.     and   Patient Active Problem List       Diagnosis  Date Noted     Gastroesophageal reflux disease  01/18/2017     Pain management contract broken  06/05/2015     Contract violation - see 12/1/15 letter.        HTN (hypertension)  08/25/2014     Urge incontinence s/p Interstim placement in 2014 06/04/2014     Alopecia areata  02/21/2014     Pyelonephritis due to Escherichia coli  06/08/2013     Benign paroxysmal positional vertigo  02/28/2013     Undifferentiated inflammatory arthritis (HC)  02/27/2012     Seasonal allergic rhinitis  09/27/2011     MENOPAUSE-RELATED VASOMOTOR SYMPTOMS  09/21/2010     Tobacco use disorder       ABDOMINAL PAIN, LEFT LOWER QUADRANT  08/06/2010     ANXIETY       CLAUSTROPHOBIA       Mild persistent asthma without complication       versus chronic obstructive pulmonary disease          DYSLIPIDEMIA       low HDL          Degenerative disc disease at L5-S1 level       HIDRADENITIS SUPPURATIVA       OBESITY       PULMONARY NODULE  08/01/2007     Diabetes mellitus type 2, controlled, without complications (HC)  07/01/2007       REVIEW OF SYSTEMS:  Review of Systems   Constitutional: Negative for chills, fever and weight loss.   HENT:  "Positive for postnasal drip, rhinorrhea and sore throat. Negative for ear pain.    Respiratory: Positive for cough. Negative for shortness of breath and wheezing.    Musculoskeletal: Negative for myalgias.   Skin: Negative for rash.   Neurological: Negative for headaches.   Endo/Heme/Allergies: Negative for environmental allergies.       OBJECTIVE:  /66  Pulse 88  Temp 97.7  F (36.5  C) (Tympanic)  Ht 1.657 m (5' 5.25\")  Wt 75 kg (165 lb 6 oz)  BMI 27.31 kg/m2    EXAM:   Physical Exam   Constitutional: She is well-developed, well-nourished, and in no distress.   HENT:   Head: Normocephalic and atraumatic.   Right Ear: Tympanic membrane and ear canal normal.   Left Ear: Tympanic membrane and ear canal normal.   Nose: Mucosal edema and rhinorrhea present. Right sinus exhibits no maxillary sinus tenderness and no frontal sinus tenderness. Left sinus exhibits no maxillary sinus tenderness and no frontal sinus tenderness.   Mouth/Throat: Uvula is midline and mucous membranes are normal. Posterior oropharyngeal erythema present.   Eyes: Conjunctivae are normal.   Neck: Neck supple.   Cardiovascular: Normal rate, regular rhythm and normal heart sounds.    Pulmonary/Chest: Effort normal and breath sounds normal. No respiratory distress. She has no decreased breath sounds. She has no wheezes. She has no rhonchi. She has no rales.   Dry cough is heard.    Lymphadenopathy:     She has no cervical adenopathy.   Skin: Skin is warm and dry.   Psychiatric: Mood and affect normal.   Nursing note and vitals reviewed.    Completed Chest xray.  I personally reviewed the xray. There was no infiltrates noted upon initial read of xray. No new pneumonia. Final read pending by radiology.    ASSESSMENT/PLAN:    ICD-10-CM    1. Cough in adult R05 XR CHEST 2 VIEWS PA AND LATERAL   2. Viral URI with cough J06.9 benzonatate (TESSALON PERLES) 100 mg capsule     B97.89         Plan:  Home cares and OTC gone over. AVS gone over. " Symptoms likely due to virus. No antibiotic is needed at this time. Symptoms typically worse on days 2-5 and then stabilize and you are sick for days 5-12. Days 12-14 there is slow resolution and if there is a cough, studies show it can linger longer, however one is not as ill as in the beginning. If symptoms begin worsening or fail to improve after 14 days, return to clinic for reevaluation. All questions were answered and she is in agreement with plan.       GEORGES GRAVES NP ....................  10/16/2017   5:16 PM

## 2017-12-28 NOTE — TELEPHONE ENCOUNTER
Patient Information     Patient Name MRN Sex Alejandra Hoffmann [id number] Female 1965      Telephone Encounter by Landy Reardon at 2017  3:07 PM     Author:  Landy Reardon Service:  (none) Author Type:  (none)     Filed:  2017  3:07 PM Encounter Date:  2017 Status:  Signed     :  Landy Reardon            Patient is aware to try lidoderm patch over the counter.  Landy Reardon LPN ....................2017  3:07 PM

## 2017-12-28 NOTE — TELEPHONE ENCOUNTER
Patient Information     Patient Name MRN Sex Alejandra Hoffmann [id number] Female 1965      Telephone Encounter by Dipak Welsh MD at 2017  2:54 PM     Author:  Dipak Welsh MD Service:  (none) Author Type:  Physician     Filed:  2017  2:55 PM Encounter Date:  2017 Status:  Signed     :  Dipak Welsh MD (Physician)            Yes, that would work similarly.   I do not see ever prescribing the lidoderm patch, only lidocaine topically through a mail order pharmacy, so not sure where the Rx or PA came from. Has been seeing [facility]

## 2017-12-28 NOTE — TELEPHONE ENCOUNTER
Patient Information     Patient Name MRN Alejandra Acuna [id number] Female 1965      Telephone Encounter by Karely Dejesus at 11/3/2017 10:04 AM     Author:  Karely Dejesus Service:  (none) Author Type:  (none)     Filed:  11/3/2017 10:06 AM Encounter Date:  11/3/2017 Status:  Signed     :  Karely Dejesus            See previous note.     Karely Dejesus LPN.................. 11/3/2017 10:06 AM

## 2017-12-28 NOTE — PATIENT INSTRUCTIONS
Patient Information     Patient Name MRN Sex Alejandra Hoffmann [id number] Female 1965      Patient Instructions by Dipak Welsh MD at 2017  3:00 PM     Author:  Dipak Welsh MD  Service:  (none) Author Type:  Physician     Filed:  2017  3:53 PM  Encounter Date:  2017 Status:  Addendum     :  Dipak Welsh MD (Physician)        Related Notes: Original Note by Dipak Welsh MD (Physician) filed at 2017  3:47 PM            Ordered mammogram  Should have an eye exam for diabetes. Your A1c is really good at 5.1%    Bupropion one daily for 4 days, then twice daily. In about a month from starting, then quit smoking. Stay on bupropion for 3 months.  May pair the nicotine patches with bupropion. 14 mg for 1/2 pack per day for 1 month, then 7 mg for 1 month    Oxycodone #60 to last until surgery with Dr Garza

## 2017-12-28 NOTE — TELEPHONE ENCOUNTER
Patient Information     Patient Name MRN Alejandra Acuna [id number] Female 1965      Telephone Encounter by Diana Boucher RN at 2017  1:08 PM     Author:  Diana Boucher RN Service:  (none) Author Type:  NURS- Registered Nurse     Filed:  2017  1:11 PM Encounter Date:  2017 Status:  Signed     :  Diana Boucher RN (NURS- Registered Nurse)            Filled 17 #90 x 3. Pharmacy alerted. Unable to complete prescription refill per RN Medication Refill Policy.................... Diana Boucher RN ....................  2017   1:10 PM    Prescribing Provider: Dipak Tsai MD                Order Date: 2017  Ordered by: DIPAK TSAI  Medication:PARoxetine (PAXIL) 10 mg tablet    Qty:90 tablet   Ref:3  Start:2017    End:              Route:Oral                  SYMONE:No   Class:eRx    Sig:Take 1 tablet by mouth every morning.    Pharmacy:[facility] [id number] [address]  [address] - [phone]

## 2017-12-28 NOTE — TELEPHONE ENCOUNTER
Patient Information     Patient Name MRN Alejandra Acuna [id number] Female 1965      Telephone Encounter by Diana Boucher RN at 10/3/2017 11:30 AM     Author:  Diana Boucher RN Service:  (none) Author Type:  NURS- Registered Nurse     Filed:  10/3/2017 11:33 AM Encounter Date:  2017 Status:  Signed     :  Diana Boucher RN (NURS- Registered Nurse)            Filled 17 #3 inhalers x 2 refills. Pharmacy alerted. Unable to complete prescription refill per RN Medication Refill Policy.................... Diana Boucher RN ....................  10/3/2017   11:32 AM  Prescribing Provider: Dipak Tsai MD                Order Date: 2017  Ordered by: DIPAK TSAI  Medication:albuterol HFA (PROAIR HFA) 90 mcg/actuation inhaler    Qty:3 Inhaler   Ref:2  Start:2017    End:              Route:Inhalation            SYMONE:No   Class:eRx    Sig:Inhale 2 Puffs by mouth every 4 hours if needed.    Pharmacy:[facility] [address] [place] [address]   [address]              [address] - [phone]

## 2017-12-28 NOTE — TELEPHONE ENCOUNTER
Patient Information     Patient Name MRN Sex Alejandra Hoffmann [id number] Female 1965      Telephone Encounter by Landy Reardon at 2017  1:50 PM     Author:  Landy Reardon Service:  (none) Author Type:  (none)     Filed:  2017  1:51 PM Encounter Date:  2017 Status:  Signed     :  Landy Reardon            Should patient use over the counter lidoderm 4% patch ?  Landy Reardon LPN ....................2017  1:50 PM

## 2017-12-28 NOTE — TELEPHONE ENCOUNTER
Patient Information     Patient Name MRN Sex Alejandra Hoffmann [id number] Female 1965      Telephone Encounter by Landy Reardon at 2017  3:20 PM     Author:  Landy Reardon Service:  (none) Author Type:  (none)     Filed:  2017  3:20 PM Encounter Date:  2017 Status:  Signed     :  Landy Reardon            Was re faxed .  Landy Reardon LPN ....................2017  3:20 PM

## 2017-12-28 NOTE — PROGRESS NOTES
Patient Information     Patient Name MRN Alejandra Acuna [id number] Female 1965      Progress Notes by Dipak Welsh MD at 2017  3:00 PM     Author:  Dipak Welsh MD Service:  (none) Author Type:  Physician     Filed:  2017  3:42 PM Encounter Date:  2017 Status:  Signed     :  Dipak Welsh MD (Physician)            PREOPERATIVE CLEARANCE  Date of Exam: 2017     HPI:  51 y.o.  Female with asthma and diabetes here for preop exam prior to L knee arthroscopy with Dr Garza in [place]. She has surgery scheduled .    She wanted a physical, but has Medicare and this is not covered and what she really needed is a preop, so that was performed instead.    At baseline health. No recent URI. Breathing is stable. Uses albuterol PRN, not regularly. PFTs were near normal when performed in .    Using oxycodone about 3 per day from Dr Garza for pain in the L knee. Her R knee pain improved with arthroscopy. Says it did not help when Dr Torres performed surgery, but is better now that Dr Garza performed surgery.     Smoking 1/2 ppd. Never had the nicotine patches filled. Bupropion helped some. Chantix caused nightmares.    Diabetes under good control with metformin.     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     Seasonal allergic rhinitis J30.2     ANXIETY F41.1     CLAUSTROPHOBIA F40.298     Mild persistent asthma without complication J45.30     DYSLIPIDEMIA E78.5     Degenerative disc disease at L5-S1 level M51.36     HIDRADENITIS SUPPURATIVA L73.2     Diabetes mellitus type 2, controlled, without complications (HC) E11.9     OBESITY E66.9     PULMONARY NODULE J98.4     TOBACCO ABUSE F17.200     MENOPAUSE-RELATED VASOMOTOR SYMPTOMS N95.1     ABDOMINAL PAIN, LEFT LOWER QUADRANT R10.32     Benign paroxysmal positional vertigo H81.10     Pyelonephritis due to Escherichia coli N12,  B96.20     Alopecia areata L63.9     Urge incontinence s/p Interstim placement in 2014 N39.41     HTN (hypertension) I10     Pain management contract broken Z91.138     Gastroesophageal reflux disease K21.9      Histories:  Past Medical History:     Diagnosis  Date     ALLERGIC RHINITIS 9/27/2011     ANXIETY      ASTHMA, INTERMITTENT      BACK PAIN, CHRONIC      CLAUSTROPHOBIA      DIABETES MELLITUS, TYPE II 7/1/2007     DYSLIPIDEMIA      EUSTACHIAN TUBE DYSFUNCTION 2/27/2012     HIDRADENITIS SUPPURATIVA      Hx of pregnancy     Childbirth x4       KIDNEY DISEASE 8/8/2008    Kidney disease GFR 87.      Menorrhagia     Menorrhagia       Nicotine abuse      Obesity (BMI 30.0-34.9) 07/01/2007    Obesity  Body-mass index over 30       PULMONARY NODULE 8/1/2007    Pulmonary nodules, stable on follow-up chest CT 12/08.  No further imaging recommended.      Rheumatoid arthritis (HC) 2/27/2012      Past Surgical History:      Procedure  Laterality Date     CARPAL TUNNEL RELEASE Bilateral 02/02/2017     CHOLECYSTECTOMY  06/07    Emergency tracheotomy following failed intubation for laparoscopic cholecystectomy.       DILATION AND CURETTAGE  09/06     ENDOMETRIAL ABLATION  09/06     FOREARM/WRIST SURGERY  2011     Left wrist surgery, Dr. Torres       HERNIA REPAIR  2007     Incisoinal hernia repair       HYSTERECTOMY  2010       Interstim stage 2  01/06/14    Dr. Pb GIRON       KNEE ARTHROSCOPY Right 05/2017    Dr Torres       KNEE ARTHROSCOPY Right 07/20/2017    Dr Garza, lateral release, meniscal repair       OOPHORECTOMY  2010      Left oophorectomy, Dr. Thorpe       SALPINGO-OOPHORECTOMY  06/99      Right salpingo-oophorectomy for hemorrhagic corpus luteum cyst         TRACHEOSTOMY  2007    emergency following failed intubation during cholecystectomy       TYMPANOSTOMY  08/06     PE tube placement       Social History     Social History        Marital status:       Spouse name: N/A     Number of children:   N/A     Years of education:  N/A     Occupational History      Not on file.     Social History Main Topics        Smoking status:  Current Every Day Smoker     Packs/day: 0.50     Years: 32.00     Types: Cigarettes     Smokeless tobacco:  Never Used     Alcohol use  No     Drug use:  No     Sexual activity:  Not Currently     Other Topics  Concern     Not on file      Social History Narrative      four times and now . She is unemployed.    Lives with her oldest son(he lives with her).    4 sons, 1 son lives with her others are all in the area.            Obstetric History     No data available     Family History       Problem   Relation Age of Onset     Arthritis  Mother      Rheumatoid       Cancer  Mother       lung and uterine cancer       Heart Disease  Father      CAD, CABG, peripheral vascular dise       Thyroid Disease  Father       hyperlipidemia       Other  Father      djd       Asthma  Brother      Asthma  Sister      Psychiatric illness  Sister      Anxiety       Other  Son      x2 back surgeries        Allergies: Adhesives [adhesive]; Amoxicillin; Bee pollen; Codeine; and Folic acid   Latex Allergy: no    Current Outpatient Prescriptions       Medication  Sig Dispense Refill     albuterol HFA (PROAIR HFA) 90 mcg/actuation inhaler Inhale 2 Puffs by mouth every 4 hours if needed. 3 Inhaler 2     atorvastatin (LIPITOR) 40 mg tablet Take 1.5 tablets by mouth at bedtime. 135 tablet 4     cyclobenzaprine (FLEXERIL) 10 mg tablet Take 1 tablet by mouth at bedtime if needed for Muscle Spasm. 30 tablet 5     famotidine (PEPCID) 20 mg tablet Take 1 tablet by mouth once daily if needed. 30 tablet 11     gabapentin (NEURONTIN) 800 mg tablet Take 1 tablet by mouth 3 times daily. 270 tablet 4     ibuprofen (ADVIL; MOTRIN) 800 mg tablet Take 1 tablet by mouth 3 times daily if needed. 90 tablet 2     lidocaine 5 % topical gel 2 g 4 times daily if needed. Apply  topically to affected area(s). 722.24 g 3  "    lisinopril (PRINIVIL; ZESTRIL) 10 mg tablet TAKE 1 TABLET BY MOUTH EVERY DAY 90 tablet 3     loratadine (CLARITIN) 10 mg tablet Take 1 tablet by mouth once daily if needed for Allergy Symptoms. 90 tablet 1     metFORMIN (GLUCOPHAGE) 500 mg tablet Take 1 tablet by mouth once daily with a meal. 90 tablet 4     PARoxetine (PAXIL) 10 mg tablet Take 1 tablet by mouth every morning. 90 tablet 3     No current facility-administered medications for this visit.      Medications have been reviewed by me and are current to the best of my knowledge and ability.     Recent use of: no recent use of aspirin (ASA), NSAIDS or steroids    HABITS:   Social History      Substance Use Topics        Smoking status:  Current Every Day Smoker     Packs/day: 0.50     Years: 32.00     Types: Cigarettes     Smokeless tobacco:  Never Used     Alcohol use  No   Tetanus up to date yes    Proposed anesthesia: MAC  Anesthesia Complications: None  History of abnormal bleeding : None  History of Blood Transfusions: No    Health Care Directive or Living Will: no    REVIEW OF SYSTEMS:  General: Denies general constitutional problems  Eyes: Denies problems  Ears/Nose/Throat: Denies problems  Cardiovascular: Denies problems  Respiratory: see above  Gastrointestinal: heartburn controlled  Genitourinary: Denies problems  Musculoskeletal: chronic LBP  Skin: Denies problems  Psychiatric: Denies problems    EXAM:   /60  Pulse 80  Ht 1.664 m (5' 5.5\")  Wt 77.4 kg (170 lb 9.6 oz)  Breastfeeding? No  BMI 27.96 kg/m2 Body mass index is 27.96 kg/(m^2).  General Appearance: Pleasant, alert, appropriate appearance for age. No acute distress  OroPharynx Exam: Dentures. Normal pharynx. Mallampati 2/4.  Neck Exam: Supple, no masses or nodes.  Chest/Respiratory Exam: Normal chest wall and respirations. Clear to auscultation.  Cardiovascular Exam: Regular rate and rhythm. S1, S2, no murmur, click, gallop, or rubs.  Extremities:No lower extremity " edema.  Foot Exam: Left and right foot: monofilament sensation normal, good pedal pulses, no lesions, nail hygiene good.      DIAGNOSTICS:   1. EKG: EKG FINDINGS - appears normal, NSR on 1/17/17  2. CXR: not indicated  3. Labs:   Results for orders placed or performed in visit on 07/03/17      Hgb A1c      Result  Value Ref Range    HEMOGLOBIN A1C MONITORING (POCT) 5.1 4.0 - 6.2 %    ESTIMATED AVERAGE GLUCOSE  100 mg/dL     Results for orders placed or performed in visit on 08/16/17      BASIC METABOLIC PANEL      Result  Value Ref Range    SODIUM 144 (H) 133 - 143 mmol/L    POTASSIUM 3.8 3.5 - 5.1 mmol/L    CHLORIDE 103 98 - 107 mmol/L    CO2,TOTAL 25 21 - 31 mmol/L    ANION GAP 16 5 - 18                    GLUCOSE 105 70 - 105 mg/dL    CALCIUM 9.9 8.6 - 10.3 mg/dL    BUN 11 7 - 25 mg/dL    CREATININE 0.72 0.70 - 1.30 mg/dL    BUN/CREAT RATIO           15                    GFR if African American >60 >60 ml/min/1.73m2    GFR if not African American >60 >60 ml/min/1.73m2   CBC W PLT NO DIFF      Result  Value Ref Range    WHITE BLOOD COUNT         8.9 4.5 - 11.0 thou/cu mm    RED BLOOD COUNT           4.47 4.00 - 5.20 mil/cu mm    HEMOGLOBIN                15.3 12.0 - 16.0 g/dL    HEMATOCRIT                42.5 33.0 - 51.0 %    MCV                       95 80 - 100 fL    MCH                       34.2 (H) 26.0 - 34.0 pg    MCHC                      36.0 32.0 - 36.0 g/dL    RDW                       13.1 11.5 - 15.5 %    PLATELET COUNT            319 140 - 440 thou/cu mm    MPV                       8.8 6.5 - 11.0 fL       4. Pre-op urine for pregnancy (for 12 yrs and older or menstruating): not applicable - hysterectomy    IMPRESSION:   (Z01.818) Preop examination  (primary encounter diagnosis)  (E11.9) Controlled type 2 diabetes mellitus without complication, without long-term current use of insulin (HC)  (I10) HTN (hypertension)  (M25.562) Acute pain of left knee  (Z12.31) Screening mammogram, encounter  for  (F17.200) Tobacco use disorder     For above listed surgery and anesthesia:   Patient is low risk for perioperative complications.    RECOMMENDATIONS: proceed without further diagnostic evaluation    Labs WNL. DM controlled. BP controlled. Chronic conditions are stable.    Avoid ibuprofen 1 wk prior to surgery. Given oxycodone #60 until surgery.    Ordered mammogram. It has been since 2009 since she had performed.     Reviewed available options for smoking cessation. Patient chose to use bupropion and nicotine patches. Discussed appropriate use of medication - see patient instructions. Can start bupropion now with plan to quit in a few weeks. Performed tobacco cessation counseling for greater than 3 minutes.    Electronically Signed by: Dipak Welsh MD  8/16/2017

## 2017-12-28 NOTE — TELEPHONE ENCOUNTER
Patient Information     Patient Name MRN Sex Alejandra Hoffmann [id number] Female 1965      Telephone Encounter by Landy Reardon at 2017 11:28 AM     Author:  Landy Reardon Service:  (none) Author Type:  (none)     Filed:  2017 11:29 AM Encounter Date:  2017 Status:  Signed     :  Landy Reardon            Received fax from [employer] for PA for lidocaine patch . Will send to Doc of the day .  Landy Reardon LPN ....................2017  11:29 AM

## 2017-12-28 NOTE — TELEPHONE ENCOUNTER
"Patient Information     Patient Name MRN Alejandra Acuna [id number] Female 1965      Telephone Encounter by Diana Boucher RN at 2017 12:01 PM     Author:  Diana Boucher RN Service:  (none) Author Type:  NURS- Registered Nurse     Filed:  2017 12:24 PM Encounter Date:  2017 Status:  Signed     :  Diana Boucher RN (NURS- Registered Nurse)            Patient is requesting stronger analgesic for knee pain s/p knee surgery 5 weeks ago. Recommended patient schedule an OV today to address knee pain and medication management. Patient verbalized understanding and intent to comply. Transferred to scheduling to set up OV.   Diana Boucher RN ....................  2017   12:17 PM  Answer Assessment - Initial Assessment Questions  1. LOCATION and RADIATION: \"Where is the pain located?\"     Right knee.  Had knee surgery about 5 weeks ago. Been working with Josue Sandoval. Ultrasound was done today and everything 0K. But  I need something stronger for the pain.   2. QUALITY: \"What does the pain feel like?\"  (e.g., sharp, dull, aching, burning)      Sharp   3. SEVERITY: \"How bad is the pain?\" \"What does it keep you from doing?\"   (Scale 1-10; or mild, moderate, severe)    -  MILD (1-3): doesn't interfere with normal activities     -  MODERATE (4-7): interferes with normal activities (e.g., work or school) or awakens from sleep, limping     -  SEVERE (8-10): excruciating pain, unable to do any normal activities, unable to walk      Can't sleep   4. ONSET: \"When did the pain start?\" \"Does it come and go, or is it there all the time?\"      5. RECURRENT: \"Have you had this pain before?\" If so, ask: \"When, and what happened then?\"        6. SETTING: \"Has there been any recent work, exercise or other activity that involved that part of the body?\"         7. AGGRAVATING FACTORS: \"What makes the knee pain worse?\" (e.g., walking, climbing stairs, running)        8. ASSOCIATED SYMPTOMS: \"Is there " "any swelling or redness of the knee?\"      9. OTHER SYMPTOMS: \"Do you have any other symptoms?\" (e.g., chest pain, difficulty breathing, fever, calf pain)        10. PREGNANCY: \"Is there any chance you are pregnant?\" \"When was your last menstrual period?\"    Protocols used: ADULT KNEE PAIN-A-AH            "

## 2017-12-28 NOTE — TELEPHONE ENCOUNTER
Patient Information     Patient Name MRN Sex Alejandra Hoffmann [id number] Female 1965      Telephone Encounter by Dipak Welsh MD at 2017  1:31 PM     Author:  Dipak Welsh MD Service:  (none) Author Type:  Physician     Filed:  2017  1:32 PM Encounter Date:  2017 Status:  Signed     :  Dipak Welsh MD (Physician)            No PA needed. If not covered will need alternative.

## 2017-12-28 NOTE — TELEPHONE ENCOUNTER
Patient Information     Patient Name MRN Sex Alejandra Hoffmann [id number] Female 1965      Telephone Encounter by Armando Gunn RN at 11/15/2017 11:10 AM     Author:  Armando Gunn RN Service:  (none) Author Type:  NURS- Registered Nurse     Filed:  11/15/2017 11:16 AM Encounter Date:  2017 Status:  Signed     :  Armando Gunn RN (NURS- Registered Nurse)            Ibuprofen    Office visit in the past 12 months or per provider note.    Last visit with EPI TSAI was on: 2017 in [facility]  Next visit with EPI TSAI is on: No future appointment listed with this provider  Next visit with Family Practice is on: No future appointment listed in this department    Lab test requirements:  Annual creatinine.  CREATININE (mg/dL)    Date Value   2017 0.72     Max refill for 12 months from last office visit or per provider note.    Chart review shows that patient was seen by PCP for a preop on 17. Rx as requested was noted in office visit notes on that date. No changes noted to rx in office visit notes on that date, other than to hold rx for 1 week prior to surgery. Labs up to date as noted above. Writer will refill rx as requested at this time.    Prescription refilled per RN Medication Refill Policy.................... Armando Gunn RN ....................  11/15/2017   11:14 AM

## 2017-12-28 NOTE — TELEPHONE ENCOUNTER
Patient Information     Patient Name MRN Alejandra Acuna [id number] Female 1965      Telephone Encounter by Karely Dejesus at 11/3/2017  9:33 AM     Author:  Karely Dejesus Service:  (none) Author Type:  (none)     Filed:  11/3/2017  9:34 AM Encounter Date:  10/30/2017 Status:  Signed     :  Karely Dejesus            [facility] notified of below.    Karely Dejesus LPN.................. 11/3/2017 9:34 AM

## 2017-12-28 NOTE — TELEPHONE ENCOUNTER
Patient Information     Patient Name MRN Sex Alejandra Hoffmann [id number] Female 1965      Telephone Encounter by Yuliana Fagan MD at 10/13/2017  4:22 PM     Author:  Yuliana Fagan MD Service:  (none) Author Type:  Physician     Filed:  10/13/2017  4:22 PM Encounter Date:  10/13/2017 Status:  Signed     :  Yuliana Fagan MD (Physician)            Form signed in outbox.  Also notified on the fax that I am not willing to refill anything further for patient and that she would need to be seen.  Yuliana Fagan MD ....................  10/13/2017   4:22 PM

## 2017-12-28 NOTE — PROGRESS NOTES
Patient Information     Patient Name MRN Alejandra Acuna [id number] Female 1965      Progress Notes by Dipak Welsh MD at 2017 10:30 AM     Author:  Dpiak Welsh MD Service:  (none) Author Type:  Physician     Filed:  2017 12:50 PM Encounter Date:  2017 Status:  Signed     :  Dipak Welsh MD (Physician)            SUBJECTIVE:  51 y.o. female presents for falls onto the R knee. She fell last week and then fell last night. Both times fell forward onto the anterior knee. Some anterior knee swelling, but none to the whole knee.  Previous R knee arthroscopy x 2 this summer. Was feeling well prior to the fall.     Scraped up L shin as well.      Current Outpatient Prescriptions       Medication  Sig Dispense Refill     albuterol HFA (PROAIR HFA) 90 mcg/actuation inhaler Inhale 2 Puffs by mouth every 4 hours if needed. 3 Inhaler 2     atorvastatin (LIPITOR) 40 mg tablet Take 1.5 tablets by mouth at bedtime. 135 tablet 4     benzonatate (TESSALON PERLES) 100 mg capsule Take 1 capsule by mouth 3 times daily if needed for Cough. 30 capsule 0     buPROPion (WELLBUTRIN SR) 150 mg Sustained-Release tablet Take 1 tablet by mouth 2 times daily. 60 tablet 3     cyclobenzaprine (FLEXERIL) 10 mg tablet Take 1 tablet by mouth at bedtime if needed.  5     famotidine (PEPCID) 20 mg tablet Take 1 tablet by mouth once daily if needed. 30 tablet 11     gabapentin (NEURONTIN) 800 mg tablet Take 1 tablet by mouth 3 times daily. 270 tablet 4     ibuprofen (ADVIL; MOTRIN) 800 mg tablet Take 1 tablet by mouth 3 times daily if needed. 90 tablet 2     lisinopril (PRINIVIL; ZESTRIL) 10 mg tablet TAKE 1 TABLET BY MOUTH EVERY DAY 90 tablet 3     metFORMIN (GLUCOPHAGE) 500 mg tablet Take 1 tablet by mouth once daily with a meal. 90 tablet 4     oxyCODONE (ROXICODONE) 5 mg immediate release tablet Take 1 tablet by mouth every 6 hours if needed  for Pain 60 tablet 0     PARoxetine (PAXIL)  10 mg tablet Take 1 tablet by mouth every morning. 90 tablet 3     Allergies as of 11/14/2017 - [facility] as Reviewed 11/14/2017      Allergen  Reaction Noted     Adhesives [adhesive] Rash 09/04/2012     Amoxicillin Rash 02/26/2013     Bee pollen Edema 04/07/2016     Codeine Headache 06/08/2013     Folic acid Itching 04/07/2016       OBJECTIVE:  BP 92/60  Pulse 76  Temp 97.7  F (36.5  C) (Tympanic)   Wt 74 kg (163 lb 3.2 oz)  Breastfeeding? No  BMI 26.95 kg/m2    Knee exam - right without effusion. Tender over patella, most tender inferiorly. Full range of motion. No ligamentous instability or deformity noted.  Skin: Abrasion down L shin. No erythema.    ASSESSMENT/PLAN:    ICD-10-CM   1. Right anterior knee pain M25.561     Recommend x-ray to ensure no fracture given pain over patella. Neg x-ray. No swelling intra-articular, so unlikely any significant damage. She asked for opioids and I declined for this injury. 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 Recommend rest and ice.  F/U PRN

## 2017-12-28 NOTE — PATIENT INSTRUCTIONS
Patient Information     Patient Name MRN Alejandra Acuna [id number] Female 1965      Patient Instructions by Julia Cullen NP at 10/16/2017  4:00 PM     Author:  Julia Cullen NP Service:  (none) Author Type:  PHYS- Nurse Practitioner     Filed:  10/16/2017  4:53 PM Encounter Date:  10/16/2017 Status:  Signed     :  Julia Cullen NP (PHYS- Nurse Practitioner)            Cold Medicines   What are cold medicines?  Symptoms of the common cold start gradually over several days and usually last about two weeks. Symptoms may include sneezing, a stuffy or runny nose, sore throat, cough, watery eyes, mild headache, or body aches. A cold will go away on its own without treatment. However, there are many nonprescription products that may help relieve some of the symptoms of a cold. Cold medicines often contain more than one ingredient and are used to treat more than one symptom. Read the labels and buy products that have only the ingredients that you need. If you are not sure which medicine is best, ask your pharmacist.  How do they work?  Decongestants reduce swelling in your nose and sinuses. They may also lessen the amount of mucus made by your nose. If you use decongestants more often than directed, your stuffy nose may get worse.   Antihistamines block the effect of histamine. Histamine is a chemical your body makes when you have an allergic reaction. Antihistamines are most often used to treat itchy or watery eyes or a stuffy or runny nose caused by an allergy. Antihistamines may not help a stuffy or runny nose caused by a cold because they can make mucus thick and dry.  Mucolytics are medicines that make mucus thinner so that it is easier to cough up out of your throat and lungs.  Expectorants are cough medicines that may help to keep the mucus thin and bring up mucus from the lungs when you cough. This may relieve chest congestion and make it easier to breathe.   Cough  suppressants (antitussives) are medicines that lessen the urge to cough. They may give relief from a dry, hacking cough. If you have a cough that is wet sounding and produces mucus, it is important for you to cough the mucus up out of your lungs. For this reason, cough suppressants are not recommended for a wet sounding cough.  Fever and pain relievers, such as acetaminophen, aspirin, or other nonsteroidal anti-inflammatory drugs (NSAIDs), are often included in cold medicine. Read labels carefully to avoid taking more medicine than you need.  What else do I need to know about this medicine?    Talk to your healthcare provider if your symptoms start suddenly or you have severe symptoms. This may mean you have something more serious than a cold.    Follow the directions that come with your medicine, including information about food or alcohol. Make sure you know how and when to take your medicine. Do not take more or less than you are supposed to take.    Try to get all of your medicine at the same place. Your pharmacist can help make sure that all of your medicines are safe to take together.    Keep a list of your medicines with you. List all of the prescription medicines, nonprescription medicines, supplements, natural remedies, and vitamins that you take. Tell all healthcare providers who treat you about all of the products you are taking.    Many medicines have side effects. A side effect is a symptom or problem that is caused by the medicine. Ask your healthcare provider or pharmacist what side effects the medicine may cause and what you should do if you have side effects.    Nonsteroidal anti-inflammatory medicines (NSAIDs), such as ibuprofen, naproxen, and aspirin, may cause stomach bleeding and other problems. These risks increase with age. Read the label and take as directed. Unless recommended by your healthcare provider, do not take for more than 10 days for any reason.    Acetaminophen may cause liver  damage or other problems. Unless recommended by your provider, don't take more than 3000 milligrams (mg) in 24 hours. To make sure you don t take too much, check other medicines you take to see if they also contain acetaminophen. Ask your provider if you need to avoid drinking alcohol while taking this medicine.  If you have any questions, ask your healthcare provider or pharmacist for more information. Be sure to keep all appointments for provider visits or tests.        Symptoms likely due to virus. No antibiotic is needed at this time. Symptoms typically worse on days 2-5 and then stabilize and you are sick for days 5-12. Days 12-14 there is slow resolution and if there is a cough, studies show it can linger longer, however one is not as ill as in the beginning. If symptoms begin worsening or fail to improve after 14 days, return to clinic for reevaluation.

## 2017-12-28 NOTE — TELEPHONE ENCOUNTER
Patient Information     Patient Name MRN Sex Alejandra Hoffmann [id number] Female 1965      Telephone Encounter by Diana Boucher RN at 2017 11:25 AM     Author:  Diana Boucher RN  Service:  (none) Author Type:  NURS- Registered Nurse     Filed:  2017 11:27 AM  Encounter Date:  2017 Status:  Addendum     :  Diana Boucher RN (NURS- Registered Nurse)        Related Notes: Original Note by Diana Boucher RN (NURS- Registered Nurse) filed at 2017 11:25 AM            Refill request inappropriate. Filled 17 125 x 4 .Pharmacy alerted. Unable to complete prescription refill per RN Medication Refill Policy.................... Diana Boucher RN ....................  2017   11:27 AM    Prescribing Provider: Dipak Tsai MD                Order Date: 2017  Ordered by: DIPAK TSAI  Medication:atorvastatin (LIPITOR) 40 mg tablet    Qty:135 tablet  Ref:4  Start:2017    End:              Route:Oral                  SYMONE:No   Class:eRx    Sig:Take 1.5 tablets by mouth at bedtime.    Pharmacy:[facility] [id number] [address]   [address]              OF [facility] [address] - [phone]

## 2017-12-28 NOTE — TELEPHONE ENCOUNTER
Patient Information     Patient Name MRN Sex Alejandra Hoffmann [id number] Female 1965      Telephone Encounter by Stefanie Cornell at 2017  2:52 PM     Author:  Stefanie Cornell Service:  (none) Author Type:  (none)     Filed:  2017  2:52 PM Encounter Date:  2017 Status:  Signed     :  Stefanie Cornell            Patient notified that lidocaine ointment needs a PA. Explained that Yuliana Fagan MD would like to see her prior to the form being filled out.  Stefanie Cornell LPN.......................... 2017  2:52 PM

## 2017-12-28 NOTE — TELEPHONE ENCOUNTER
Patient Information     Patient Name MRN Alejandra Acuna [id number] Female 1965      Telephone Encounter by Emily Cervantes at 2017  3:11 PM     Author:  Emily Cervantes Service:  (none) Author Type:  (none)     Filed:  2017  3:12 PM Encounter Date:  10/30/2017 Status:  Signed     :  Emily Cervantes            Pharmacy called and stated that they need Yuliana Fagan MD to write a letter verifying a prescription they will be faxing.  They states that this needs to be on [employer] letterhead and not just signed on the sample letter.  Emily Cervantes LPN........................2017  3:12 PM

## 2017-12-28 NOTE — TELEPHONE ENCOUNTER
Patient Information     Patient Name MRN Alejandra Acuna [id number] Female 1965      Telephone Encounter by Gena Torres RN at 2017 10:39 AM     Author:  Gena Torres RN Service:  (none) Author Type:  NURS- Registered Nurse     Filed:  2017 10:43 AM Encounter Date:  2017 Status:  Signed     :  Gena Torres RN (NURS- Registered Nurse)            Depression-in adults 18 and over  SSRI    Office visit in the past 12 months or as indicated in chart.  Should have clinic visit 1-2 months after initial prescription.    Last visit with DIPAK WELSH was on: 2017 in [facility]  Next visit with DIPAK WELSH is on: No future appointment listed with this provider  Next visit with Family Practice is on: No future appointment listed in this department    Max refills 12 months from last office visit or per providers notes.    Prescribing Provider: Dipak Welsh MD              Order Date: 2017  Ordered by: DIPAK WELSH  Medication:PARoxetine (PAXIL) 10 mg tablet    Qty:90 tablet   Ref:3  Start:2017    End:              Route:Oral                  SYMONE:No   Class:eRx    Sig:Take 1 tablet by mouth every morning.    Pharmacy:[facility] [address] [place] [address]   [address]              [address] - [phone]  Unable to complete prescription refill due to recent refilled at pharmacy per RN Medication Refill Policy.................... Gena Torres RN ....................  2017   10:41 AM

## 2017-12-29 NOTE — H&P
Patient Information     Patient Name MRN Alejandra Acuna [id number] Female 1965      H&P by Dipak Welsh MD at 2017  3:00 PM     Author:  Dipak Welsh MD Service:  (none) Author Type:  Physician     Filed:  2017  3:42 PM Encounter Date:  2017 Status:  Signed     :  Dipak Welsh MD (Physician)            PREOPERATIVE CLEARANCE  Date of Exam: 2017     HPI:  51 y.o.  Female with asthma and diabetes here for preop exam prior to L knee arthroscopy with Dr Garza in [place]. She has surgery scheduled .    She wanted a physical, but has Medicare and this is not covered and what she really needed is a preop, so that was performed instead.    At baseline health. No recent URI. Breathing is stable. Uses albuterol PRN, not regularly. PFTs were near normal when performed in .    Using oxycodone about 3 per day from Dr Garza for pain in the L knee. Her R knee pain improved with arthroscopy. Says it did not help when Dr Torres performed surgery, but is better now that Dr Garza performed surgery.     Smoking 1/2 ppd. Never had the nicotine patches filled. Bupropion helped some. Chantix caused nightmares.    Diabetes under good control with metformin.     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     Seasonal allergic rhinitis J30.2     ANXIETY F41.1     CLAUSTROPHOBIA F40.298     Mild persistent asthma without complication J45.30     DYSLIPIDEMIA E78.5     Degenerative disc disease at L5-S1 level M51.36     HIDRADENITIS SUPPURATIVA L73.2     Diabetes mellitus type 2, controlled, without complications (HC) E11.9     OBESITY E66.9     PULMONARY NODULE J98.4     TOBACCO ABUSE F17.200     MENOPAUSE-RELATED VASOMOTOR SYMPTOMS N95.1     ABDOMINAL PAIN, LEFT LOWER QUADRANT R10.32     Benign paroxysmal positional vertigo H81.10     Pyelonephritis due to Escherichia coli N12, B96.20      Alopecia areata L63.9     Urge incontinence s/p Interstim placement in 2014 N39.41     HTN (hypertension) I10     Pain management contract broken Z91.138     Gastroesophageal reflux disease K21.9      Histories:  Past Medical History:     Diagnosis  Date     ALLERGIC RHINITIS 9/27/2011     ANXIETY      ASTHMA, INTERMITTENT      BACK PAIN, CHRONIC      CLAUSTROPHOBIA      DIABETES MELLITUS, TYPE II 7/1/2007     DYSLIPIDEMIA      EUSTACHIAN TUBE DYSFUNCTION 2/27/2012     HIDRADENITIS SUPPURATIVA      Hx of pregnancy     Childbirth x4       KIDNEY DISEASE 8/8/2008    Kidney disease GFR 87.      Menorrhagia     Menorrhagia       Nicotine abuse      Obesity (BMI 30.0-34.9) 07/01/2007    Obesity  Body-mass index over 30       PULMONARY NODULE 8/1/2007    Pulmonary nodules, stable on follow-up chest CT 12/08.  No further imaging recommended.      Rheumatoid arthritis (HC) 2/27/2012      Past Surgical History:      Procedure  Laterality Date     CARPAL TUNNEL RELEASE Bilateral 02/02/2017     CHOLECYSTECTOMY  06/07    Emergency tracheotomy following failed intubation for laparoscopic cholecystectomy.       DILATION AND CURETTAGE  09/06     ENDOMETRIAL ABLATION  09/06     FOREARM/WRIST SURGERY  2011     Left wrist surgery, Dr. Torres       HERNIA REPAIR  2007     Incisoinal hernia repair       HYSTERECTOMY  2010       Interstim stage 2  01/06/14    Dr. Pb GIRON       KNEE ARTHROSCOPY Right 05/2017    Dr Torres       KNEE ARTHROSCOPY Right 07/20/2017    Dr Garza, lateral release, meniscal repair       OOPHORECTOMY  2010      Left oophorectomy, Dr. Thorpe       SALPINGO-OOPHORECTOMY  06/99      Right salpingo-oophorectomy for hemorrhagic corpus luteum cyst         TRACHEOSTOMY  2007    emergency following failed intubation during cholecystectomy       TYMPANOSTOMY  08/06     PE tube placement       Social History     Social History        Marital status:       Spouse name: N/A     Number of children:  N/A      Years of education:  N/A     Occupational History      Not on file.     Social History Main Topics        Smoking status:  Current Every Day Smoker     Packs/day: 0.50     Years: 32.00     Types: Cigarettes     Smokeless tobacco:  Never Used     Alcohol use  No     Drug use:  No     Sexual activity:  Not Currently     Other Topics  Concern     Not on file      Social History Narrative      four times and now . She is unemployed.    Lives with her oldest son(he lives with her).    4 sons, 1 son lives with her others are all in the area.            Obstetric History     No data available     Family History       Problem   Relation Age of Onset     Arthritis  Mother      Rheumatoid       Cancer  Mother       lung and uterine cancer       Heart Disease  Father      CAD, CABG, peripheral vascular dise       Thyroid Disease  Father       hyperlipidemia       Other  Father      djd       Asthma  Brother      Asthma  Sister      Psychiatric illness  Sister      Anxiety       Other  Son      x2 back surgeries        Allergies: Adhesives [adhesive]; Amoxicillin; Bee pollen; Codeine; and Folic acid   Latex Allergy: no    Current Outpatient Prescriptions       Medication  Sig Dispense Refill     albuterol HFA (PROAIR HFA) 90 mcg/actuation inhaler Inhale 2 Puffs by mouth every 4 hours if needed. 3 Inhaler 2     atorvastatin (LIPITOR) 40 mg tablet Take 1.5 tablets by mouth at bedtime. 135 tablet 4     cyclobenzaprine (FLEXERIL) 10 mg tablet Take 1 tablet by mouth at bedtime if needed for Muscle Spasm. 30 tablet 5     famotidine (PEPCID) 20 mg tablet Take 1 tablet by mouth once daily if needed. 30 tablet 11     gabapentin (NEURONTIN) 800 mg tablet Take 1 tablet by mouth 3 times daily. 270 tablet 4     ibuprofen (ADVIL; MOTRIN) 800 mg tablet Take 1 tablet by mouth 3 times daily if needed. 90 tablet 2     lidocaine 5 % topical gel 2 g 4 times daily if needed. Apply  topically to affected area(s). 722.24 g 3      "lisinopril (PRINIVIL; ZESTRIL) 10 mg tablet TAKE 1 TABLET BY MOUTH EVERY DAY 90 tablet 3     loratadine (CLARITIN) 10 mg tablet Take 1 tablet by mouth once daily if needed for Allergy Symptoms. 90 tablet 1     metFORMIN (GLUCOPHAGE) 500 mg tablet Take 1 tablet by mouth once daily with a meal. 90 tablet 4     PARoxetine (PAXIL) 10 mg tablet Take 1 tablet by mouth every morning. 90 tablet 3     No current facility-administered medications for this visit.      Medications have been reviewed by me and are current to the best of my knowledge and ability.     Recent use of: no recent use of aspirin (ASA), NSAIDS or steroids    HABITS:   Social History      Substance Use Topics        Smoking status:  Current Every Day Smoker     Packs/day: 0.50     Years: 32.00     Types: Cigarettes     Smokeless tobacco:  Never Used     Alcohol use  No   Tetanus up to date yes    Proposed anesthesia: MAC  Anesthesia Complications: None  History of abnormal bleeding : None  History of Blood Transfusions: No    Health Care Directive or Living Will: no    REVIEW OF SYSTEMS:  General: Denies general constitutional problems  Eyes: Denies problems  Ears/Nose/Throat: Denies problems  Cardiovascular: Denies problems  Respiratory: see above  Gastrointestinal: heartburn controlled  Genitourinary: Denies problems  Musculoskeletal: chronic LBP  Skin: Denies problems  Psychiatric: Denies problems    EXAM:   /60  Pulse 80  Ht 1.664 m (5' 5.5\")  Wt 77.4 kg (170 lb 9.6 oz)  Breastfeeding? No  BMI 27.96 kg/m2 Body mass index is 27.96 kg/(m^2).  General Appearance: Pleasant, alert, appropriate appearance for age. No acute distress  OroPharynx Exam: Dentures. Normal pharynx. Mallampati 2/4.  Neck Exam: Supple, no masses or nodes.  Chest/Respiratory Exam: Normal chest wall and respirations. Clear to auscultation.  Cardiovascular Exam: Regular rate and rhythm. S1, S2, no murmur, click, gallop, or rubs.  Extremities:No lower extremity edema.  Foot " Exam: Left and right foot: monofilament sensation normal, good pedal pulses, no lesions, nail hygiene good.      DIAGNOSTICS:   1. EKG: EKG FINDINGS - appears normal, NSR on 1/17/17  2. CXR: not indicated  3. Labs:   Results for orders placed or performed in visit on 07/03/17      Hgb A1c      Result  Value Ref Range    HEMOGLOBIN A1C MONITORING (POCT) 5.1 4.0 - 6.2 %    ESTIMATED AVERAGE GLUCOSE  100 mg/dL     Results for orders placed or performed in visit on 08/16/17      BASIC METABOLIC PANEL      Result  Value Ref Range    SODIUM 144 (H) 133 - 143 mmol/L    POTASSIUM 3.8 3.5 - 5.1 mmol/L    CHLORIDE 103 98 - 107 mmol/L    CO2,TOTAL 25 21 - 31 mmol/L    ANION GAP 16 5 - 18                    GLUCOSE 105 70 - 105 mg/dL    CALCIUM 9.9 8.6 - 10.3 mg/dL    BUN 11 7 - 25 mg/dL    CREATININE 0.72 0.70 - 1.30 mg/dL    BUN/CREAT RATIO           15                    GFR if African American >60 >60 ml/min/1.73m2    GFR if not African American >60 >60 ml/min/1.73m2   CBC W PLT NO DIFF      Result  Value Ref Range    WHITE BLOOD COUNT         8.9 4.5 - 11.0 thou/cu mm    RED BLOOD COUNT           4.47 4.00 - 5.20 mil/cu mm    HEMOGLOBIN                15.3 12.0 - 16.0 g/dL    HEMATOCRIT                42.5 33.0 - 51.0 %    MCV                       95 80 - 100 fL    MCH                       34.2 (H) 26.0 - 34.0 pg    MCHC                      36.0 32.0 - 36.0 g/dL    RDW                       13.1 11.5 - 15.5 %    PLATELET COUNT            319 140 - 440 thou/cu mm    MPV                       8.8 6.5 - 11.0 fL       4. Pre-op urine for pregnancy (for 12 yrs and older or menstruating): not applicable - hysterectomy    IMPRESSION:   (Z01.818) Preop examination  (primary encounter diagnosis)  (E11.9) Controlled type 2 diabetes mellitus without complication, without long-term current use of insulin (HC)  (I10) HTN (hypertension)  (M25.562) Acute pain of left knee  (Z12.31) Screening mammogram, encounter for  (F17.200) Tobacco  use disorder     For above listed surgery and anesthesia:   Patient is low risk for perioperative complications.    RECOMMENDATIONS: proceed without further diagnostic evaluation    Labs WNL. DM controlled. BP controlled. Chronic conditions are stable.    Avoid ibuprofen 1 wk prior to surgery. Given oxycodone #60 until surgery.    Ordered mammogram. It has been since 2009 since she had performed.     Reviewed available options for smoking cessation. Patient chose to use bupropion and nicotine patches. Discussed appropriate use of medication - see patient instructions. Can start bupropion now with plan to quit in a few weeks. Performed tobacco cessation counseling for greater than 3 minutes.    Electronically Signed by: Dipak Welsh MD  8/16/2017

## 2017-12-29 NOTE — H&P
Patient Information     Patient Name MRN Sex Alejandra Hoffmann [id number] Female 1965      H&P by Radha Mckinley MD at 7/3/2017  3:00 PM     Author:  Radha Mckinley MD Service:  (none) Author Type:  Physician     Filed:  7/3/2017  3:37 PM Encounter Date:  7/3/2017 Status:  Signed     :  Radha Mckinley MD (Physician)              PREOPERATIVE CLEARANCE  Date of Exam: 7/3/2017    Nursing Notes:   Shannan Colby  7/3/2017  3:03 PM  Addendum  This patient presents today for a Preoperative exam for this procedure: Rt Knee-lateral release  Date of Surgery: 17   Surgeon:  Greg  Place of surgery: [facility]  Facility:  Fax:  [phone]    Shannan Colby ....................  7/3/2017   2:56 PM           HPI:  Alejandra Villegas is a 51 y.o. Female with recent right knee arthroscopy in May and persistent pain. After multiple visits to that surgeon she is frustrated and went to see Dr Garza for a second opinion and he will do this procedure to improve mobility and reduce pain. Plan is to have left knee done in the future.     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     Seasonal allergic rhinitis J30.2     ANXIETY F41.1     CLAUSTROPHOBIA F40.298     Mild persistent asthma without complication J45.30     DYSLIPIDEMIA E78.5     Degenerative disc disease at L5-S1 level M51.36     HIDRADENITIS SUPPURATIVA L73.2     Diabetes mellitus type 2, controlled, without complications (HC) E11.9     OBESITY E66.9     PULMONARY NODULE J98.4     TOBACCO ABUSE F17.200     MENOPAUSE-RELATED VASOMOTOR SYMPTOMS N95.1     ABDOMINAL PAIN, LEFT LOWER QUADRANT R10.32     Benign paroxysmal positional vertigo H81.10     Pyelonephritis due to Escherichia coli N12, B96.20     Alopecia areata L63.9     Urge incontinence s/p Interstim placement in  N39.41     HTN (hypertension) I10     Pain management contract  broken Z91.138     Gastroesophageal reflux disease K21.9      Histories:  Past Medical History:     Diagnosis  Date     ALLERGIC RHINITIS 9/27/2011     ANXIETY      ASTHMA, INTERMITTENT      BACK PAIN, CHRONIC      CLAUSTROPHOBIA      DIABETES MELLITUS, TYPE II 7/1/2007     DYSLIPIDEMIA      EUSTACHIAN TUBE DYSFUNCTION 2/27/2012     HIDRADENITIS SUPPURATIVA      Hx of pregnancy     Childbirth x4       KIDNEY DISEASE 8/8/2008    Kidney disease GFR 87.      Menorrhagia     Menorrhagia       Nicotine abuse      Obesity (BMI 30.0-34.9) 07/01/2007    Obesity  Body-mass index over 30       PULMONARY NODULE 8/1/2007    Pulmonary nodules, stable on follow-up chest CT 12/08.  No further imaging recommended.      Rheumatoid arthritis (HC) 2/27/2012      Past Surgical History:      Procedure  Laterality Date     CARPAL TUNNEL RELEASE Bilateral 02/02/2017     CHOLECYSTECTOMY  06/07    Emergency tracheotomy following failed intubation for laparoscopic cholecystectomy.       DILATION AND CURETTAGE  09/06     ENDOMETRIAL ABLATION  09/06     FOREARM/WRIST SURGERY  2011     Left wrist surgery, Dr. Torres       HERNIA REPAIR  2007     Incisoinal hernia repair       HYSTERECTOMY  2010       Interstim stage 2  01/06/14    Dr. Pb GIRON       OOPHORECTOMY  2010      Left oophorectomy, Dr. Thorpe       SALPINGO-OOPHORECTOMY  06/99      Right salpingo-oophorectomy for hemorrhagic corpus luteum cyst         TRACHEOSTOMY  2007    emergency following failed intubation during cholecystectomy       TYMPANOSTOMY  08/06     PE tube placement       Social History     Social History        Marital status:       Spouse name: N/A     Number of children:  N/A     Years of education:  N/A     Occupational History      Not on file.     Social History Main Topics        Smoking status:  Current Every Day Smoker     Packs/day: 0.50     Years: 32.00     Types: Cigarettes     Smokeless tobacco:  Never Used     Alcohol use  No     Drug use:   No     Sexual activity:  Not Currently     Other Topics  Concern     Not on file      Social History Narrative      four times and now . She is unemployed.    Lives with her oldest son(he lives with her).    4 sons, 1 son lives with her others are all in the area.            Obstetric History     No data available     Family History       Problem   Relation Age of Onset     Arthritis  Mother      Rheumatoid       Cancer  Mother       lung and uterine cancer       Heart Disease  Father      CAD, CABG, peripheral vascular dise       Thyroid Disease  Father       hyperlipidemia       Other  Father      djd       Asthma  Brother      Asthma  Sister      Psychiatric illness  Sister      Anxiety       Other  Son      x2 back surgeries        Allergies: Adhesives [adhesive]; Amoxicillin; Bee pollen; Codeine; and Folic acid   Latex Allergy: no    Current Medications:  Current Outpatient Rx       Medication  Sig Dispense Refill     albuterol HFA (PROAIR HFA) 90 mcg/actuation inhaler Inhale 2 Puffs by mouth every 4 hours if needed. 3 Inhaler 2     atorvastatin (LIPITOR) 40 mg tablet Take 1.5 tablets by mouth at bedtime. 135 tablet 4     cyclobenzaprine (FLEXERIL) 10 mg tablet Take 1 tablet by mouth at bedtime if needed for Muscle Spasm. 30 tablet 5     famotidine (PEPCID) 20 mg tablet Take 1 tablet by mouth once daily if needed. 30 tablet 11     gabapentin (NEURONTIN) 800 mg tablet Take 1 tablet by mouth 3 times daily. 270 tablet 4     ibuprofen (ADVIL; MOTRIN) 800 mg tablet Take 1 tablet by mouth 3 times daily if needed. 90 tablet 2     lidocaine 5 % topical gel 2 g 4 times daily if needed. Apply  topically to affected area(s). 722.24 g 3     lisinopril (PRINIVIL; ZESTRIL) 10 mg tablet TAKE 1 TABLET BY MOUTH EVERY DAY 90 tablet 3     loratadine (CLARITIN) 10 mg tablet Take 1 tablet by mouth once daily if needed for Allergy Symptoms. 90 tablet 1     metFORMIN (GLUCOPHAGE) 500 mg tablet Take 1 tablet by mouth  "once daily with a meal. 90 tablet 4     PARoxetine (PAXIL) 10 mg tablet Take 1 tablet by mouth every morning. 90 tablet 3     Medications have been reviewed by me and are current to the best of my knowledge and ability.    Recent use of: no recent use of aspirin (ASA), NSAIDS or steroids    HABITS:   Social History      Substance Use Topics        Smoking status:  Current Every Day Smoker     Packs/day: 0.50     Years: 32.00     Types: Cigarettes     Smokeless tobacco:  Never Used     Alcohol use  No   Tetanus up to date yes    Proposed anesthesia: Per surgeon and anesthesia.  Anesthesia Complications: None  History of abnormal bleeding : None  History of Blood Transfusions: No    Health Care Directive or Living Will: no    REVIEW OF SYSTEMS:  REVIEW OF SYSTEMS:  A comprehensive review of systems was negative except for items noted in HPI/Subjective.      Preoperative Evaluation: Obstructive Sleep Apnea screening    S: Snore -  Do you snore loudly? (louder than talking or loud enough to be heard through closed doors)(NO)  T: Tired - Do you often feel tired, fatigued, or sleepy during the daytime?(NO)  O: Observed - Has anyone ever observed you stop breathing during your sleep?(NO)  P: Pressure - Do you have or are you being treated for high blood pressure?(YES)  B: BMI - BMI greater than 35kg/m2?(NO)  A: Age - Age over 50 years old?(NO)  N: Neck - Neck circumference greater than 40 cm?(NO)  G: Gender - Gender: Male?(NO)    Total number of \"YES\" responses:  1    Scoring: Low risk of CATALINO 0-2  At Risk of CATALINO: >3 High Risk of CATALINO: 5-8        EXAM:   /66  Pulse 62  Resp 14  Ht 1.664 m (5' 5.5\")  Wt 79.3 kg (174 lb 12.8 oz)  BMI 28.65 kg/m2 Body mass index is 28.65 kg/(m^2).  General Appearance: Pleasant, alert, appropriate appearance for age. No acute distress  Ear Exam: Normal TM's bilaterally. Normal auditory canals and external ears. Non-tender.  OroPharynx Exam: Dental hygiene adequate. Normal buccal " mucosa. Normal pharynx.  Neck Exam: Supple, no masses or nodes.  Thyroid Exam: No nodules or enlargement.  Cardiovascular Exam: Regular rate and rhythm. S1, S2, no murmur, click, gallop, or rubs.  Lungs: Intermittent inspiratory wheeze that cleared with cough.   Musculoskeletal Exam: Back is straight and non-tender, full ROM of upper and lower extremities.  Skin: Normal.  Psychiatric Exam: Alert and oriented, appropriate affect.    DIAGNOSTICS:   1. EKG: EKG FINDINGS - Not indicated  2. CXR: Not indicated.  3. Labs:   Results for orders placed or performed in visit on 07/03/17      Hgb A1c      Result  Value Ref Range    HEMOGLOBIN A1C MONITORING (POCT) 5.1 4.0 - 6.2 %    ESTIMATED AVERAGE GLUCOSE  100 mg/dL   I have personally reviewed the labs listed above.    4. Pre-op urine for pregnancy (for 12 yrs and older or menstruating): not applicable-hysterectomy    IMPRESSION:   1. Preop examination    2. Chronic pain of right knee    3. Type 2 diabetes mellitus without complication, without long-term current use of insulin (HC)    4. Mild persistent asthma without complication    5. HTN (hypertension)         For above listed surgery and anesthesia:   Patient is low risk for perioperative complications.    RECOMMENDATIONS: proceed without further diagnostic evaluation and resume all medications post-operative or per surgeon  Smoking cessation advised.  Electronically Signed by:    Radha Mckinley MD  3:23 PM 7/3/2017

## 2017-12-30 NOTE — NURSING NOTE
Patient Information     Patient Name MRN Sex Alejandra Hoffmann [id number] Female 1965      Nursing Note by Shannan Colby at 7/3/2017  3:00 PM     Author:  Shannan Colby  Service:  (none) Author Type:  (none)     Filed:  7/3/2017  3:20 PM  Encounter Date:  7/3/2017 Status:  Addendum     :  Radha Mckinley MD (Physician)        Related Notes: Original Note by Radha Mckinley MD (Physician) filed at 7/3/2017  3:13 PM            This patient presents today for a Preoperative exam for this procedure: Rt Knee-lateral release  Date of Surgery: 17   Surgeon:  Greg  Place of surgery: [facility]  Facility:  Fax:  [phone]    Shannan Colby ....................  7/3/2017   2:56 PM

## 2017-12-30 NOTE — NURSING NOTE
Patient Information     Patient Name MRN Alejandra Acuna [id number] Female 1965      Nursing Note by Jeanna Araujo at 2017 10:30 AM     Author:  Jeanna Araujo Service:  (none) Author Type:  (none)     Filed:  2017 11:02 AM Encounter Date:  2017 Status:  Signed     :  Jeanna Araujo            Patient is here today with a right knee injury, she states she fell on it last night. Jeanna Araujo LPN......................2017 10:56 AM

## 2017-12-30 NOTE — NURSING NOTE
Patient Information     Patient Name MRN Sex Alejandra Hoffmann [id number] Female 1965      Nursing Note by Martita Bishop at 2017  3:00 PM     Author:  Martita Bishop Service:  (none) Author Type:  (none)     Filed:  2017  3:33 PM Encounter Date:  2017 Status:  Signed     :  Martita Bishop            Alejandra Villegas is a 51 y.o. female presenting for a physical.  Previous A1C is at goal of <8  HEMOGLOBIN A1C MONITORING (POCT)    Date Value   2017 5.1 %   2013 5.5 % NGSP     Urine microalbumin:creatine: 6.3  Foot exam none on file  Eye exam none on file    Patient is a current smoker  Patient is not on a daily aspirin  Patient is on a Statin.  Blood pressure today of   is at the goal of <139/89 for diabetics.    Martita Bishop LPN..............2017 3:12 PM

## 2017-12-30 NOTE — NURSING NOTE
Patient Information     Patient Name MRN Sex Alejandra Hoffmann [id number] Female 1965      Nursing Note by Belinda Mclain at 10/16/2017  4:00 PM     Author:  Belinda Mclain Service:  (none) Author Type:  (none)     Filed:  10/16/2017  4:24 PM Encounter Date:  10/16/2017 Status:  Signed     :  Belinda Mclain            Patient presents to the clinic for white-yellow phlegm producing cough and chest congestion x1 week. Patient reports having pneumonia last month.  Belinda URIAS, JONNY.......10/16/2017..4:11 PM

## 2017-12-30 NOTE — NURSING NOTE
Patient Information     Patient Name MRN Alejandra Acuna [id number] Female 1965      Nursing Note by Lucy James at 2017 10:15 AM     Author:  Lucy James Service:  (none) Author Type:  (none)     Filed:  2017  2:09 PM Encounter Date:  2017 Status:  Signed     :  Lucy James  from [employer] stated that pt needs to be seed in 9 months for battery check. Appointment scheduled for 08/15/18.  Lucy James LPN  2017  2:09 PM

## 2017-12-30 NOTE — NURSING NOTE
Patient Information     Patient Name MRN Alejandra Acuna [id number] Female 1965      Nursing Note by Lucy James at 2017 10:15 AM     Author:  Lucy James Service:  (none) Author Type:  (none)     Filed:  2017 10:20 AM Encounter Date:  2017 Status:  Signed     :  Lucy James            Here for Interstim check with RepNeo James LPN  2017  10:20 AM

## 2018-01-02 NOTE — NURSING NOTE
Patient Information     Patient Name MRN Sex Alejandra Hoffmann [id number] Female 1965      Nursing Note by Madison Lima RN at 1/3/2017 10:00 AM     Author:  Madison Lima RN Service:  (none) Author Type:  NURS- Registered Nurse     Filed:  1/3/2017 10:16 AM Encounter Date:  1/3/2017 Status:  Signed     :  Madison Lima RN (NURS- Registered Nurse)            [facility] patients symptoms are great and no program changes needed.  Battery has 21-40 months left on it and impedance is good per Edna.  Rl Ambriz MD aware.  Madison Lima RN.........1/3/2017...10:09 AM

## 2018-01-02 NOTE — PATIENT INSTRUCTIONS
Patient Information     Patient Name MRN Sex Alejandra Hoffmann [id number] Female 1965      Patient Instructions by Dipak Welsh MD at 2017  2:30 PM     Author:  Dipak Welsh MD  Service:  (none) Author Type:  Physician     Filed:  2017  3:02 PM  Encounter Date:  2017 Status:  Addendum     :  Dipak Welsh MD (Physician)        Related Notes: Original Note by Dipak Welsh MD (Physician) filed at 2017  3:02 PM            Diclofenac gel for knees. If this is not covered, let me know and then we will use the drops suggested by mail order pharmacy  Increase gabapentin to 800 mg three times daily   Letter with A1c result

## 2018-01-02 NOTE — NURSING NOTE
Patient Information     Patient Name MRN Alejandra Acuna [id number] Female 1965      Nursing Note by Madison Lima RN at 1/3/2017 10:00 AM     Author:  Madison Lima RN Service:  (none) Author Type:  NURS- Registered Nurse     Filed:  1/3/2017  9:51 AM Encounter Date:  1/3/2017 Status:  Signed     :  Madison Lima RN (NURS- Registered Nurse)            Patient is here for follow up on her Interstim device with the [employer] representative.  Madison Lima RN.........1/3/2017...9:51 AM

## 2018-01-02 NOTE — PROGRESS NOTES
"Patient Information     Patient Name MRN Alejandra Acuna [id number] Female 1965      Progress Notes by Dipak Welsh MD at 2017  2:30 PM     Author:  Dipak Welsh MD Service:  (none) Author Type:  Physician     Filed:  1/3/2017  2:21 PM Encounter Date:  2017 Status:  Signed     :  Dipak Welsh MD (Physician)            SUBJECTIVE:  51 y.o. female presents for knee pain, back pain and follow up on diabetes, asthma.    She would like diclofenac drops through mail order pharmacy, but needed an appointment. Says she has anterior knee pain. Ibuprofen will help some when she uses, but does not take regularly. Notes popping of knees with movement.     Lidoderm ointment helping back now that she received it from this pharmacy.    Would like to increase gabapentin for back pain. Thinks she's become \"immune\" to this current dose. Wants something to keep her moving. Not sedated by current dose. Requested capsules, but could not tell me why she wanted capsules over tablets.    Uses albuterol inhaler a couple times weekly. Advair made her \"sick\" to her stomach, so she stopped.     Recommend smoking cessation. Tried nicotine patches, inhaler. Tried e-cigarette. Did not like Chantix.     A1c has been good.     REVIEW OF SYSTEMS:    Constitutional: Negative  Ears, nose, mouth, throat, and face: Would like ears checked. Both ears bother her more in the winter.   Respiratory: see above  Cardiovascular: Negative      Current Outpatient Prescriptions       Medication  Sig Dispense Refill     albuterol HFA (PROAIR HFA) 90 mcg/actuation inhaler Inhale 2 Puffs by mouth every 4 hours if needed for Shortness Of Breath or Wheezing. 3 Inhaler 2     atorvastatin (LIPITOR) 40 mg tablet TAKE 1 AND 1/2 TABLETS BY MOUTH EVERY NIGHT AT BEDTIME 135 tablet 0     fluticasone-salmeterol (ADVAIR DISKUS) 250-50 mcg/Dose diskus inhaler Inhale 1 Puff by mouth. Twice daily  as needed        gabapentin " "(NEURONTIN) 600 mg tablet Take 1 tablet by mouth 3 times daily. 90 tablet 11     ibuprofen (ADVIL; MOTRIN) 800 mg tablet TAKE 1 TABLET BY MOUTH THREE TIMES DAILY 60 tablet 3     lidocaine 5 % topical gel 2 g 4 times daily if needed. Apply  topically to affected area(s). 722.24 g 3     lisinopril (PRINIVIL; ZESTRIL) 10 mg tablet TAKE 1 TABLET BY MOUTH EVERY DAY 30 tablet 11     loratadine (CLARITIN) 10 mg tablet TAKE 1 TABLET BY MOUTH ONCE DAILY 90 tablet 1     metFORMIN (GLUCOPHAGE) 500 mg tablet TAKE 1 TABLET BY MOUTH EVERY DAY WITH A MEAL 30 tablet 11     PARoxetine (PAXIL) 10 mg tablet Take 1 tablet by mouth every morning. 90 tablet 3     Allergies as of 01/02/2017 - [facility] as Reviewed 01/02/2017      Allergen  Reaction Noted     Adhesives [adhesive] Rash 09/04/2012     Amoxicillin Rash 02/26/2013     Bee pollen Edema 04/07/2016     Codeine Headache 06/08/2013     Folic acid Itching 04/07/2016       OBJECTIVE:  Visit Vitals       /69     Pulse 69     Ht 1.63 m (5' 4.17\")     Wt 74.4 kg (164 lb)     Breastfeeding No     BMI 28 kg/m2       General Appearance: Pleasant, alert, appropriate appearance for age. No acute distress  Ear Exam: Normal TM's bilaterally. Normal auditory canals and external ears. Non-tender.  Musculoskeletal Exam: Both knees with some crepitus, worse on L. No effusion. Tender anteriorly, less so along joint line. No clear DJD by exam.   Psychiatric Exam: Happy, quite pleasant.     Results for orders placed or performed in visit on 01/02/17      HEMOGLOBIN A1C MONITORING (POCT)      Result  Value Ref Range    HEMOGLOBIN A1C MONITORING (POCT) 5.9 4.0 - 6.2 %    ESTIMATED AVERAGE GLUCOSE  123 mg/dL      ASSESSMENT/PLAN:    ICD-10-CM   1. Bilateral anterior knee pain M25.561     M25.562   2. Degenerative disc disease at L5-S1 level M51.36   3. Moderate persistent asthma without complication J45.40   4. Controlled type 2 diabetes mellitus without complication, without long-term current use of " insulin (HC) E11.9   5. Needs flu shot Z23     Agree to diclofenac topically for knee pain. Discussed gel vs the drops. She would like to see if gel is covered. If not, then will write for the drops.    Increase gabapentin from 600 to 800 mg TID. Not sure if capsules vs tablets are less likely to be abused, but requesting capsules specifically made me curious.    Asthma well enough controlled. She declines smoking cessation, plans to try vaporizer/e-cig.    A1c showing good DM control.    Given flu shot.     F/U PRN

## 2018-01-02 NOTE — NURSING NOTE
Patient Information     Patient Name MRN Alejandra Acuna [id number] Female 1965      Nursing Note by Martita Bishop at 2017  2:30 PM     Author:  Martita Bishop Service:  (none) Author Type:  (none)     Filed:  2017  2:42 PM Encounter Date:  2017 Status:  Signed     :  Martita Bishop            Alejandraligia Villegas is a 51 y.o. female  presenting today for medication management  Martita Bishop LPN 2017 2:31 PM

## 2018-01-03 NOTE — TELEPHONE ENCOUNTER
Patient Information     Patient Name MRN Sex     Alejandra Villegas [id number] Female 1965      Telephone Encounter by Diana Boucher RN at 2017  3:28 PM     Author:  Diana Boucher RN Service:  (none) Author Type:  NURS- Registered Nurse     Filed:  2017  3:35 PM Encounter Date:  2017 Status:  Signed     :  Diana Boucher RN (NURS- Registered Nurse)            Biguanides    Office visit in the past 12 months or per provider note.    Last visit with RADHA ORDONEZ was on: 2015 in [facility]  Next visit with RADHA ORDONEZ is on: No future appointment listed with this provider  Next visit with Family Practice is on: No future appointment listed in this department    Lab test requirements:  HgbA1c annually or per provider note.  HEMOGLOBIN A1C MONITORING (POCT)    Date Value   2017 5.9 %   2013 5.5 % NGSP     HEMOGLOBIN A1C GIH (%)    Date Value   11/10/2011 5.5       Max refill for 12 months from last office visit or per provider note.    If taking for polycystic ovary disease, may refill for 12 months.  Due for exam.  Limited refill per protocol and letter mailed.  Diana Boucher RN ........   2017    3:29 PM

## 2018-01-03 NOTE — H&P
Patient Information     Patient Name MRN Alejandra Acuna [id number] Female 1965      H&P by Dipak Welsh MD at 2017  2:00 PM     Author:  Dipak Welsh MD Service:  (none) Author Type:  Physician     Filed:  2017  6:55 PM Encounter Date:  2017 Status:  Signed     :  Dipak Welsh MD (Physician)            PREOPERATIVE CLEARANCE  Date of Exam: 2017    Nursing Notes:   Martita Bishop  2017  2:11 PM  Unsigned  This patient presents today for a Preoperative exam for this procedure: right carpal tunnel    Date of Surgery: 17   Surgeon:  Melissa  Facility:  [facility]  Fax:    Martita JEAN Bishop 2017 2:11 PM          HPI:  51 y.o. Female with asthma and diabetes here for preop exam prior to carpal tunnel surgery.     No recent URI. At baseline health. A1c recently showing good control of DM. Uses albuterol inhaler when ill, but not using multiple times daily. PFTs were near normal when performed in .    Did not receive diclofenac gel yet for knee. Good relief with lidoderm ointment.    Has some heartburn. Not occurring daily. Would like something to take for this.    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     EUSTACHIAN TUBE DYSFUNCTION H69.80     ALLERGIC RHINITIS J30.9     ANXIETY F41.1     CLAUSTROPHOBIA F40.298     Asthma, moderate persistent J45.40     DYSLIPIDEMIA E78.5     Degenerative disc disease at L5-S1 level M51.36     MENORRHAGIA N92.0     HIDRADENITIS SUPPURATIVA L73.2     Diabetes mellitus type 2, controlled, without complications (HC) E11.9     OBESITY E66.9     PULMONARY NODULE J98.4     KIDNEY DISEASE N28.9     OVARIAN CYST, LEFT N83.209     TOBACCO ABUSE F17.200     MENOPAUSE-RELATED VASOMOTOR SYMPTOMS N95.1     FOOT PAIN M79.609     DE QUERVAIN'S TENOSYNOVITIS, RIGHT WRIST M65.4     WRIST PAIN M25.549     PRONATION FOOT OR ANKLE,  ACQUIRED M21.869, M21.6X9     ABDOMINAL PAIN, LEFT LOWER QUADRANT R10.32     HEADACHE, TENSION G44.209     Benign paroxysmal positional vertigo H81.10     Pyelonephritis due to Escherichia coli N12, B96.20     Urinary retention R33.9     Alopecia areata L63.9     Urge incontinence s/p Interstim placement in 2014 N39.41     HTN (hypertension) I10     Pain management contract broken Z91.19      Histories:  Past Medical History      Diagnosis   Date     ALLERGIC RHINITIS  9/27/2011     ANXIETY       ASTHMA, INTERMITTENT       BACK PAIN, CHRONIC       CLAUSTROPHOBIA       DIABETES MELLITUS, TYPE II  7/1/2007     DYSLIPIDEMIA       EUSTACHIAN TUBE DYSFUNCTION  2/27/2012     HIDRADENITIS SUPPURATIVA       Hx of pregnancy       Childbirth x4       KIDNEY DISEASE  8/8/2008     Kidney disease GFR 87.      Menorrhagia       Menorrhagia       Nicotine abuse       Obesity (BMI 30.0-34.9)  07/01/2007     Obesity  Body-mass index over 30       PULMONARY NODULE  8/1/2007     Pulmonary nodules, stable on follow-up chest CT 12/08.  No further imaging recommended.      Rheumatoid arthritis(714.0)  2/27/2012      Past Surgical History       Procedure   Laterality Date     Salpingo-oophorectomy   06/99       Right salpingo-oophorectomy for hemorrhagic corpus luteum cyst         Tympanostomy   08/06      PE tube placement       Dilation and curettage   09/06     Endometrial ablation   09/06     Cholecystectomy   06/07     Emergency tracheotomy following failed intubation for laparoscopic cholecystectomy.       Hernia repair   2007      Incisoinal hernia repair       Oophorectomy   2010       Left oophorectomy, Dr. Thorpe       Hysterectomy   2010       Forearm/wrist surgery   2011      Left wrist surgery, Dr. Torres       Interstim stage 2   01/06/14     Dr. Pb RANGEL       Social History     Social History        Marital status:       Spouse name: N/A     Number of children:  N/A     Years of education:  N/A      Occupational History      Not on file.     Social History Main Topics        Smoking status:  Current Every Day Smoker     Packs/day: 0.50     Years: 32.00     Types: Cigarettes     Smokeless tobacco:  Never Used     Alcohol use  No     Drug use:  No     Sexual activity:  Not Currently     Other Topics  Concern     Not on file      Social History Narrative      four times and now . She is unemployed.    Lives with male friend    [place]    4 sons - grown    Patient currently smokes.    Updated  10/21/2013    Preload  12/14/2012 11/13/2013    Unemployed. Smoking: desire 6/10, ability 6/10.      Obstetric History     No data available     Family History       Problem   Relation Age of Onset     Heart Disease  Father      CAD, CABG, peripheral vascular dise       Thyroid Disease  Father       hyperlipidemia       Arthritis  Mother      Rheumatoid       Cancer  Mother       lung and uterine cancer       Asthma  Brother      Asthma  Sister      Psychiatric illness  Sister      Anxiety       Other  Son      x2 back surgeries       Other  Father      djd        Allergies: Adhesives [adhesive]; Amoxicillin; Bee pollen; Codeine; and Folic acid   Latex Allergy: no    Current Medications:  Current Outpatient Rx       Medication  Sig Dispense Refill     albuterol HFA (PROAIR HFA) 90 mcg/actuation inhaler Inhale 2 Puffs by mouth every 4 hours if needed for Shortness Of Breath or Wheezing. 3 Inhaler 2     atorvastatin (LIPITOR) 40 mg tablet TAKE 1 AND 1/2 TABLETS BY MOUTH EVERY NIGHT AT BEDTIME 135 tablet 0     diclofenac 1 % topical (VOLTAREN) gel Apply 1 g topically to affected area(s) 2 times daily. To both knees as needed 300 g 4     gabapentin (NEURONTIN) 800 mg tablet Take 1 tablet by mouth 3 times daily. 90 tablet 11     ibuprofen (ADVIL; MOTRIN) 800 mg tablet TAKE 1 TABLET BY MOUTH THREE TIMES DAILY 60 tablet 3     lidocaine 5 % topical gel 2 g 4 times daily if needed. Apply   "topically to affected area(s). 722.24 g 3     lisinopril (PRINIVIL; ZESTRIL) 10 mg tablet TAKE 1 TABLET BY MOUTH EVERY DAY 30 tablet 11     loratadine (CLARITIN) 10 mg tablet TAKE 1 TABLET BY MOUTH ONCE DAILY 90 tablet 1     metFORMIN (GLUCOPHAGE) 500 mg tablet TAKE 1 TABLET BY MOUTH EVERY DAY WITH A MEAL 30 tablet 11     PARoxetine (PAXIL) 10 mg tablet Take 1 tablet by mouth every morning. 90 tablet 3     Medications have been reviewed by me and are current to the best of my knowledge and ability.    Recent use of: no recent use of aspirin (ASA), NSAIDS or steroids    HABITS:   Social History      Substance Use Topics        Smoking status:  Current Every Day Smoker     Packs/day: 0.50     Years: 32.00     Types: Cigarettes     Smokeless tobacco:  Never Used     Alcohol use  No   Tetanus up to date yes    Proposed anesthesia: Block  Anesthesia Complications: None  History of abnormal bleeding : None  History of Blood Transfusions: No    Health Care Directive or Living Will: no    REVIEW OF SYSTEMS:  General: Denies general constitutional problems  Eyes: Denies problems  Ears/Nose/Throat: Denies problems  Cardiovascular: Denies problems  Respiratory: see above  Gastrointestinal: see above  Genitourinary: Denies problems  Musculoskeletal: chronic LBP  Skin: Denies problems  Psychiatric: Denies problems    EXAM:   Visit Vitals       /60     Pulse 73     Ht 1.664 m (5' 5.5\")     Wt 76.2 kg (168 lb)     SpO2 93%     Breastfeeding No     BMI 27.53 kg/m2    Body mass index is 27.53 kg/(m^2).  General Appearance: Pleasant, alert, appropriate appearance for age. No acute distress  Ear Exam: Normal TM's bilaterally. Normal auditory canals and external ears. Non-tender.  OroPharynx Exam: Dentures. Normal pharynx. Mallampati 2/4.  Neck Exam: Supple, no masses or nodes.  Chest/Respiratory Exam: Normal chest wall and respirations. Clear to auscultation.  Cardiovascular Exam: Regular rate and rhythm. S1, S2, no murmur, " click, gallop, or rubs.  Extremities: 2 + pedal pulses.  No lower extremity edema.  Neurologic Exam: Nonfocal; symmetric DTRs, normal gross motor movement, tone, and coordination. No tremor.      DIAGNOSTICS:   1. EKG: EKG FINDINGS - appears normal, NSR  2. CXR: not indicated  3. Labs: none indicated   Recent labs:  Results for orders placed or performed in visit on 01/02/17      HEMOGLOBIN A1C MONITORING (POCT)      Result  Value Ref Range    HEMOGLOBIN A1C MONITORING (POCT) 5.9 4.0 - 6.2 %    ESTIMATED AVERAGE GLUCOSE  123 mg/dL      HEMOGLOBIN                (g/dL)    Date Value   11/10/2013 14.5   4. Pre-op urine for pregnancy (for 12 yrs and older or menstruating): not applicable - hysterectomy    IMPRESSION:   (Z01.818) Preop examination  (primary encounter diagnosis)  (I10) HTN (hypertension)  (E11.9) Controlled type 2 diabetes mellitus without complication, without long-term current use of insulin (HC)  (J45.30) Mild persistent asthma without complication  (K21.9) Gastroesophageal reflux disease, esophagitis presence not specified     For above listed surgery and anesthesia:   Patient is low risk for perioperative complications.    RECOMMENDATIONS: proceed without further diagnostic evaluation  DM controlled  BP controlled  Rare use of albuterol    Discussed PPI vs H2 blocker and risks. She does not need regularly, will use famotidine daily PRN    Electronically Signed by: Dipak Welsh MD  1/18/2017

## 2018-01-03 NOTE — NURSING NOTE
Patient Information     Patient Name MRN Sex Alejandra Hoffmann [id number] Female 1965      Nursing Note by Martita Bishop at 2017  2:00 PM     Author:  Martita Bishop Service:  (none) Author Type:  (none)     Filed:  2017  2:17 PM Encounter Date:  2017 Status:  Signed     :  Martita Bishop            This patient presents today for a Preoperative exam for this procedure: right carpal tunnel    Date of Surgery: 17   Surgeon:  Melissa  Facility:  [facility]  Fax:    Martita Bishop LPN 2017 2:11 PM

## 2018-01-03 NOTE — TELEPHONE ENCOUNTER
Patient Information     Patient Name MRN Sex Alejandra Hoffmann [id number] Female 1965      Telephone Encounter by Diana Boucher RN at 3/13/2017 11:28 AM     Author:  Diana Boucher RN Service:  (none) Author Type:  NURS- Registered Nurse     Filed:  3/13/2017 11:36 AM Encounter Date:  3/11/2017 Status:  Signed     :  Diana Boucher RN (NURS- Registered Nurse)              Statins  Office visit in the past 12 months.  Last visit with RADHA ORDONEZ was on: 2015 in [facility]  Next visit with RADHA ORDONEZ is on: No future appointment listed with this provider  2017 -Pre- op with Dr. Welsh  Last Lipids:  Chol: 154    2016  T    2016  HDL:   34    2016  LDL:  88    2016  LDL DIRECT:  No results found in past 5 years    .  Max refills 12 months from last office visit.    Due for exam.  Limited refill per protocol and letter mailed.  Diana Boucher RN ........   3/13/2017    11:31 AM        Refill request for Metformin inappropriate. Too soon. Filled 17 #90-not due until 17. Pharmacy alerted. Unable to complete prescription refill per RN Medication Refill Policy.................... Diana Boucher RN ....................  3/13/2017   11:34 AM

## 2018-01-03 NOTE — TELEPHONE ENCOUNTER
Patient Information     Patient Name MRN Sex Alejandra Hoffmann [id number] Female 1965      Telephone Encounter by Diana Boucher RN at 2017  8:46 AM     Author:  Diana Boucher RN Service:  (none) Author Type:  NURS- Registered Nurse     Filed:  2017  8:51 AM Encounter Date:  2017 Status:  Signed     :  Diana Boucher RN (NURS- Registered Nurse)            .Nsaids  Office visit in the past 12 months or per provider note.  Last visit with RADHA ORDONEZ was on: 2015 in [facility]  Next visit with RADHA ORDONEZ is on: No future appointment listed with this provider  Next visit with Family Practice is on: No future appointment listed in this department  Max refill for 12 months from last office visit or per provider note.  Due for exam.  Limited refill per protocol and letter mailed.  Diana Boucher RN ........   2017    8:47 AM

## 2018-01-03 NOTE — TELEPHONE ENCOUNTER
"Patient Information     Patient Name MRN Sex Alejandra Hoffmann [id number] Female 1965      Telephone Encounter by Consuelo Green RN at 2017  8:39 AM     Author:  Consuelo Green RN Service:  (none) Author Type:  NURS- Registered Nurse     Filed:  2017  8:42 AM Encounter Date:  2017 Status:  Signed     :  Consuelo Green RN (NURS- Registered Nurse)            Faxed refill request for cyclobenzaprine refused.  Medication discontinued at 8/10/16 OV \"pt states no longer taking medication\".  Unable to complete prescription refill per RN Medication Refill Policy.................... Consuelo Green RN ....................  2017   8:40 AM                "

## 2018-01-03 NOTE — TELEPHONE ENCOUNTER
Patient Information     Patient Name MRN Sex Alejandra Hoffmann [id number] Female 1965      Telephone Encounter by Diana Boucher RN at 2017 11:36 AM     Author:  Diana Boucher RN Service:  (none) Author Type:  NURS- Registered Nurse     Filed:  2017 11:40 AM Encounter Date:  2017 Status:  Signed     :  Diana Boucher RN (NURS- Registered Nurse)            Refill request for Flexeril inappropriate. Review indicates that this medication was discontinued on 8/10/16 - patient states no longer taking. Pharmacy alerted. Unable to complete prescription refill per RN Medication Refill Policy.................... Diana Boucher RN ....................  2017   11:39 AM

## 2018-01-03 NOTE — PATIENT INSTRUCTIONS
Patient Information     Patient Name MRN Sex Alejandra Hoffmann [id number] Female 1965      Patient Instructions by Dipak Welsh MD at 2017  2:00 PM     Author:  Dipak Welsh MD Service:  (none) Author Type:  Physician     Filed:  2017  2:30 PM Encounter Date:  2017 Status:  Signed     :  Dipak Welsh MD (Physician)            Sent in famotidine as needed  Otherwise cleared for surgery

## 2018-01-04 NOTE — TELEPHONE ENCOUNTER
Patient Information     Patient Name MRN Alejandra Acuna [id number] Female 1965      Telephone Encounter by Armando Gunn RN at 2017 10:43 AM     Author:  Armando Gunn RN Service:  (none) Author Type:  NURS- Registered Nurse     Filed:  2017 10:49 AM Encounter Date:  2017 Status:  Signed     :  Armando Gunn RN (NURS- Registered Nurse)            Faxed rx request received from pharmacy with regards to patient's gabapentin. Rx request states that patient is eligible for 90 day supply of gabapentin. Is requesting 90 day supply be dispensed. Current rx in chart as noted below:    Prescribing Provider: Dipak Welsh MD                Order Date: 2017  Ordered by: DIPAK WELSH  Medication:gabapentin (NEURONTIN) 800 mg tablet    Qty:90 tablet   Ref:11  Start:2017    End:              Route:Oral                  SYMONE:No   Class:eRx    Sig:Take 1 tablet by mouth 3 times daily.    Pharmacy:[facility] [address] [place] [address] AT [address]              [address] - [phone]    Chart review shows that last time rx was filled in chart was on 17. Office visit notes on that date show:    Increase gabapentin from 600 to 800 mg TID. Not sure if capsules vs tablets are less likely to be abused, but requesting capsules specifically made me curious.    Writer uncomfortable with dispensing a 90 day supply at this time. Will refuse request.    Unable to complete prescription refill per RN Medication Refill Policy.................... Armando Gunn RN ....................  2017   10:47 AM

## 2018-01-04 NOTE — TELEPHONE ENCOUNTER
Patient Information     Patient Name MRN Sex Alejandra Hoffmann [id number] Female 1965      Telephone Encounter by Yuliana Fagan MD at 2017  9:10 AM     Author:  Yuliana Fagan MD Service:  (none) Author Type:  Physician     Filed:  2017  9:11 AM Encounter Date:  4/10/2017 Status:  Signed     :  Yuliana Fagan MD (Physician)            Refill completed.  Yuliana Fagan MD ....................  2017   9:10 AM

## 2018-01-04 NOTE — PATIENT INSTRUCTIONS
Patient Information     Patient Name MRN Sex Alejandra Hoffmann [id number] Female 1965      Patient Instructions by Dipak Welsh MD at 2017  1:30 PM     Author:  Dipak Welsh MD Service:  (none) Author Type:  Physician     Filed:  2017  2:07 PM Encounter Date:  2017 Status:  Signed     :  Dipak Welsh MD (Physician)            Continue same medications  Avoid the ibuprofen for 4 days prior to surgery  OK to take morning medication with a sip of water

## 2018-01-04 NOTE — TELEPHONE ENCOUNTER
Patient Information     Patient Name MRN Sex Alejandra Hoffmann [id number] Female 1965      Telephone Encounter by Diana Boucher RN at 2017  8:45 AM     Author:  Diana Boucher RN Service:  (none) Author Type:  NURS- Registered Nurse     Filed:  2017  8:52 AM Encounter Date:  4/10/2017 Status:  Signed     :  Diana Boucher RN (NURS- Registered Nurse)            Request physician consideration to refill Lisinopril 10 mg. Patient has an OV scheduled today with PCP.     Ace Inhibitors  Office visit in the past 12 months or per provider note.  Last visit with RADHA ORDONEZ was on: 2015 in [place] [facility]  Next visit with RADHA ORDONEZ is on: 2017 in [facility]  Lab test requirements:  Creatinine and Potassium annually, if ordering lab, order BMP.  CREATININE (mg/dL)    Date Value   2016 0.68 (L)     POTASSIUM (mmol/L)    Date Value   2016 4.0   Max refill for 12 months from last office visit or per provider note  Unable to complete prescription refill per RN Medication Refill Policy.................... Diana Boucher RN ....................  2017   8:50 AM

## 2018-01-04 NOTE — H&P
Patient Information     Patient Name MRN Alejandra Acuna [id number] Female 1965      H&P by Dipak Welsh MD at 2017  1:30 PM     Author:  Dipak Welsh MD Service:  (none) Author Type:  Physician     Filed:  2017  6:33 AM Encounter Date:  2017 Status:  Signed     :  Dipak Welsh MD (Physician)            PREOPERATIVE CLEARANCE  Date of Exam: 17    Nursing Notes:   Neel Bishopa  2017  1:47 PM  Signed  This patient presents today for a Preoperative exam for this procedure: right knee scope   Date of Surgery: unknown   Surgeon:  Melissa  Facility:  [facility]  Fax:    Martita Bishop LPN 2017 1:37 PM          HPI:  51 y.o.  Female with asthma and diabetes here for preop exam prior to R knee arthroscopy.    At baseline health. No recent URI. Breathing is stable. Uses albuterol PRN, not regularly. PFTs were near normal when performed in .    Smoking 1 1/4 ppd. Tried nicotine patches with some benefit before. Bupropion helped some. Chantix caused nightmares.    Diabetes under good control.    Insurance did not cover diclofenac gel. Using lidoderm ointment for joint pain.    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     EUSTACHIAN TUBE DYSFUNCTION H69.80     ALLERGIC RHINITIS J30.9     ANXIETY F41.1     CLAUSTROPHOBIA F40.298     Mild persistent asthma without complication J45.30     DYSLIPIDEMIA E78.5     Degenerative disc disease at L5-S1 level M51.36     MENORRHAGIA N92.0     HIDRADENITIS SUPPURATIVA L73.2     Diabetes mellitus type 2, controlled, without complications (HC) E11.9     OBESITY E66.9     PULMONARY NODULE J98.4     KIDNEY DISEASE N28.9     OVARIAN CYST, LEFT N83.209     TOBACCO ABUSE F17.200     MENOPAUSE-RELATED VASOMOTOR SYMPTOMS N95.1     FOOT PAIN M79.609     DE QUERVAIN'S TENOSYNOVITIS, RIGHT WRIST M65.4     WRIST PAIN M25.549     PRONATION  FOOT OR ANKLE, ACQUIRED M21.869, M21.6X9     ABDOMINAL PAIN, LEFT LOWER QUADRANT R10.32     HEADACHE, TENSION G44.209     Benign paroxysmal positional vertigo H81.10     Pyelonephritis due to Escherichia coli N12, B96.20     Urinary retention R33.9     Alopecia areata L63.9     Urge incontinence s/p Interstim placement in 2014 N39.41     HTN (hypertension) I10     Pain management contract broken Z91.138     Gastroesophageal reflux disease K21.9      Histories:  Past Medical History:     Diagnosis  Date     ALLERGIC RHINITIS 9/27/2011     ANXIETY      ASTHMA, INTERMITTENT      BACK PAIN, CHRONIC      CLAUSTROPHOBIA      DIABETES MELLITUS, TYPE II 7/1/2007     DYSLIPIDEMIA      EUSTACHIAN TUBE DYSFUNCTION 2/27/2012     HIDRADENITIS SUPPURATIVA      Hx of pregnancy     Childbirth x4       KIDNEY DISEASE 8/8/2008    Kidney disease GFR 87.      Menorrhagia     Menorrhagia       Nicotine abuse      Obesity (BMI 30.0-34.9) 07/01/2007    Obesity  Body-mass index over 30       PULMONARY NODULE 8/1/2007    Pulmonary nodules, stable on follow-up chest CT 12/08.  No further imaging recommended.      Rheumatoid arthritis (HC) 2/27/2012      Past Surgical History:      Procedure  Laterality Date     CARPAL TUNNEL RELEASE Bilateral 02/02/2017     CHOLECYSTECTOMY  06/07    Emergency tracheotomy following failed intubation for laparoscopic cholecystectomy.       DILATION AND CURETTAGE  09/06     ENDOMETRIAL ABLATION  09/06     FOREARM/WRIST SURGERY  2011     Left wrist surgery, Dr. Torres       HERNIA REPAIR  2007     Incisoinal hernia repair       HYSTERECTOMY  2010       Interstim stage 2  01/06/14    Dr. Pb GIRON       OOPHORECTOMY  2010      Left oophorectomy, Dr. Thorpe       SALPINGO-OOPHORECTOMY  06/99      Right salpingo-oophorectomy for hemorrhagic corpus luteum cyst         TRACHEOSTOMY  2007    emergency following failed intubation during cholecystectomy       TYMPANOSTOMY  08/06     PE tube placement       Social  History     Social History        Marital status:       Spouse name: N/A     Number of children:  N/A     Years of education:  N/A     Occupational History      Not on file.     Social History Main Topics        Smoking status:  Current Every Day Smoker     Packs/day: 0.50     Years: 32.00     Types: Cigarettes     Smokeless tobacco:  Never Used     Alcohol use  No     Drug use:  No     Sexual activity:  Not Currently     Other Topics  Concern     Not on file      Social History Narrative      four times and now . She is unemployed.    Lives with male friend    [place]    4 sons - grown    Patient currently smokes.    Updated  10/21/2013    Preload  12/14/2012 11/13/2013    Unemployed. Smoking: desire 6/10, ability 6/10.      Obstetric History     No data available     Family History       Problem   Relation Age of Onset     Arthritis  Mother      Rheumatoid       Cancer  Mother       lung and uterine cancer       Heart Disease  Father      CAD, CABG, peripheral vascular dise       Thyroid Disease  Father       hyperlipidemia       Other  Father      djd       Asthma  Brother      Asthma  Sister      Psychiatric illness  Sister      Anxiety       Other  Son      x2 back surgeries        Allergies: Adhesives [adhesive]; Amoxicillin; Bee pollen; Codeine; and Folic acid   Latex Allergy: no    Current Outpatient Prescriptions       Medication  Sig Dispense Refill     albuterol HFA (PROAIR HFA) 90 mcg/actuation inhaler Inhale 2 Puffs by mouth every 4 hours if needed. 3 Inhaler 2     atorvastatin (LIPITOR) 40 mg tablet Take 1.5 tablets by mouth at bedtime. 135 tablet 4     cyclobenzaprine (FLEXERIL) 10 mg tablet Take 1 tablet by mouth at bedtime if needed for Muscle Spasm. 30 tablet 5     famotidine (PEPCID) 20 mg tablet Take 1 tablet by mouth once daily if needed. 30 tablet 11     gabapentin (NEURONTIN) 800 mg tablet Take 1 tablet by mouth 3 times daily. 270 tablet 4      "ibuprofen (ADVIL; MOTRIN) 800 mg tablet Take 1 tablet by mouth 3 times daily if needed. 90 tablet 2     lidocaine 5 % topical gel 2 g 4 times daily if needed. Apply  topically to affected area(s). 722.24 g 3     lisinopril (PRINIVIL; ZESTRIL) 10 mg tablet TAKE 1 TABLET BY MOUTH EVERY DAY 90 tablet 3     loratadine (CLARITIN) 10 mg tablet Take 1 tablet by mouth once daily if needed for Allergy Symptoms. 90 tablet 1     metFORMIN (GLUCOPHAGE) 500 mg tablet Take 1 tablet by mouth once daily with a meal. 90 tablet 4     PARoxetine (PAXIL) 10 mg tablet Take 1 tablet by mouth every morning. 90 tablet 3     No current facility-administered medications for this visit.      Medications have been reviewed by me and are current to the best of my knowledge and ability.     Recent use of: no recent use of aspirin (ASA), NSAIDS or steroids    HABITS:   Social History      Substance Use Topics        Smoking status:  Current Every Day Smoker     Packs/day: 0.50     Years: 32.00     Types: Cigarettes     Smokeless tobacco:  Never Used     Alcohol use  No   Tetanus up to date yes    Proposed anesthesia: MAC  Anesthesia Complications: None  History of abnormal bleeding : None  History of Blood Transfusions: No    Health Care Directive or Living Will: no    REVIEW OF SYSTEMS:  General: Denies general constitutional problems  Eyes: Denies problems  Ears/Nose/Throat: Denies problems  Cardiovascular: Denies problems  Respiratory: see above  Gastrointestinal: heartburn controlled  Genitourinary: Denies problems  Musculoskeletal: chronic LBP  Skin: Denies problems  Psychiatric: Denies problems    EXAM:   /70  Pulse 73  Ht 1.673 m (5' 5.87\")  Wt 78.9 kg (174 lb)  Breastfeeding? No  BMI 28.19 kg/m2 Body mass index is 28.19 kg/(m^2).  General Appearance: Pleasant, alert, appropriate appearance for age. No acute distress  Ear Exam: Normal TM's bilaterally. Normal auditory canals and external ears. Non-tender.  OroPharynx Exam: " Dentures. Normal pharynx. Mallampati 2/4.  Neck Exam: Supple, no masses or nodes.  Chest/Respiratory Exam: Normal chest wall and respirations. Clear to auscultation.  Cardiovascular Exam: Regular rate and rhythm. S1, S2, no murmur, click, gallop, or rubs.  Extremities: 2 + pedal pulses.  No lower extremity edema.  Neurologic Exam: Nonfocal; symmetric DTRs, normal gross motor movement, tone, and coordination. No tremor.      DIAGNOSTICS:   1. EKG: EKG FINDINGS - appears normal, NSR on 1/17/17  2. CXR: not indicated  3. Labs:   Results for orders placed or performed in visit on 05/01/17      COMP METABOLIC PANEL      Result  Value Ref Range    SODIUM 140 133 - 143 mmol/L    POTASSIUM 3.9 3.5 - 5.1 mmol/L    CHLORIDE 104 98 - 107 mmol/L    CO2,TOTAL 26 21 - 31 mmol/L    ANION GAP 10 5 - 18                    GLUCOSE 99 70 - 105 mg/dL    CALCIUM 9.9 8.6 - 10.3 mg/dL    BUN 12 7 - 25 mg/dL    CREATININE 0.82 0.70 - 1.30 mg/dL    BUN/CREAT RATIO           15                    GFR if African American >60 >60 ml/min/1.73m2    GFR if not African American >60 >60 ml/min/1.73m2    ALBUMIN 4.1 3.5 - 5.7 g/dL    PROTEIN,TOTAL 6.9 6.4 - 8.9 g/dL    GLOBULIN                  2.8 2.0 - 3.7 g/dL    A/G RATIO 1.5 1.0 - 2.0                    BILIRUBIN,TOTAL 0.3 0.3 - 1.0 mg/dL    ALK PHOSPHATASE 82 34 - 104 IU/L    ALT (SGPT) 12 7 - 52 IU/L    AST (SGOT) 13 13 - 39 IU/L   LIPID PANEL      Result  Value Ref Range    CHOLESTEROL,TOTAL 139 <200 mg/dL    TRIGLYCERIDES 163 (H) <150 mg/dL    HDL CHOLESTEROL 36 23 - 92 mg/dL    NON-HDL CHOLESTEROL 103 <145 mg/dl    CHOL/HDL RATIO            3.86 <4.50                    LDL CHOLESTEROL 70 <100 mg/dL    PATIENT STATUS            NON-FASTING                      HEMOGLOBIN                (g/dL)    Date Value   11/10/2013 14.5   4. Pre-op urine for pregnancy (for 12 yrs and older or menstruating): not applicable - hysterectomy    IMPRESSION:   (Z01.818) Preop examination  (primary encounter  diagnosis)  (E11.9) Controlled type 2 diabetes mellitus without complication, without long-term current use of insulin (HC)  (E11.9) Diabetes mellitus without complication (HC)  (I10) HTN (hypertension)  (E78.5) Hyperlipidemia, unspecified hyperlipidemia type  (J45.40) Moderate persistent asthma without complication  (F41.1) Anxiety state  (J30.1) Seasonal allergic rhinitis due to pollen  (M51.36) Degenerative disc disease at L5-S1 level     For above listed surgery and anesthesia:   Patient is low risk for perioperative complications.    RECOMMENDATIONS: proceed without further diagnostic evaluation  Avoid ibuprofen at least for 4 days prior to surgery  DM controlled  BP controlled  Rare use of albuterol for asthma    Refilled same medications. F/U 6 mo  Electronically Signed by: Dipak Welsh MD  5/1/2017

## 2018-01-04 NOTE — NURSING NOTE
Patient Information     Patient Name MRN Sex Alejandra Hoffmann [id number] Female 1965      Nursing Note by Martita Bishop at 2017  1:30 PM     Author:  Martita Bishop Service:  (none) Author Type:  (none)     Filed:  2017  1:47 PM Encounter Date:  2017 Status:  Signed     :  Martita Bishop            This patient presents today for a Preoperative exam for this procedure: right knee scope   Date of Surgery: unknown   Surgeon:  Melissa  Facility:  [facility]  Fax:    Martita Bishop LPN 2017 1:37 PM

## 2018-01-26 VITALS
HEIGHT: 66 IN | WEIGHT: 174.8 LBS | WEIGHT: 164 LBS | BODY MASS INDEX: 28.09 KG/M2 | DIASTOLIC BLOOD PRESSURE: 69 MMHG | HEART RATE: 62 BPM | SYSTOLIC BLOOD PRESSURE: 126 MMHG | BODY MASS INDEX: 28 KG/M2 | RESPIRATION RATE: 14 BRPM | HEART RATE: 69 BPM | HEIGHT: 64 IN | DIASTOLIC BLOOD PRESSURE: 66 MMHG | SYSTOLIC BLOOD PRESSURE: 112 MMHG

## 2018-01-26 VITALS
WEIGHT: 168 LBS | BODY MASS INDEX: 27 KG/M2 | DIASTOLIC BLOOD PRESSURE: 60 MMHG | HEART RATE: 73 BPM | OXYGEN SATURATION: 93 % | SYSTOLIC BLOOD PRESSURE: 118 MMHG | HEIGHT: 66 IN

## 2018-01-26 VITALS — BODY MASS INDEX: 26.71 KG/M2 | HEART RATE: 88 BPM | WEIGHT: 163 LBS | RESPIRATION RATE: 16 BRPM

## 2018-01-26 VITALS — BODY MASS INDEX: 28.41 KG/M2 | HEIGHT: 64 IN | HEART RATE: 72 BPM | RESPIRATION RATE: 12 BRPM | WEIGHT: 166.4 LBS

## 2018-01-26 VITALS
HEART RATE: 76 BPM | WEIGHT: 163.2 LBS | SYSTOLIC BLOOD PRESSURE: 92 MMHG | BODY MASS INDEX: 26.95 KG/M2 | TEMPERATURE: 97.7 F | DIASTOLIC BLOOD PRESSURE: 60 MMHG

## 2018-01-26 VITALS
HEIGHT: 65 IN | DIASTOLIC BLOOD PRESSURE: 66 MMHG | WEIGHT: 174 LBS | TEMPERATURE: 97.7 F | BODY MASS INDEX: 27.56 KG/M2 | SYSTOLIC BLOOD PRESSURE: 110 MMHG | BODY MASS INDEX: 27.97 KG/M2 | WEIGHT: 165.38 LBS | DIASTOLIC BLOOD PRESSURE: 70 MMHG | HEIGHT: 66 IN | HEART RATE: 88 BPM | HEART RATE: 73 BPM | SYSTOLIC BLOOD PRESSURE: 118 MMHG

## 2018-01-26 VITALS
WEIGHT: 170.6 LBS | BODY MASS INDEX: 27.42 KG/M2 | SYSTOLIC BLOOD PRESSURE: 104 MMHG | DIASTOLIC BLOOD PRESSURE: 60 MMHG | HEART RATE: 80 BPM | HEIGHT: 66 IN

## 2018-01-27 ASSESSMENT — ANXIETY QUESTIONNAIRES
GAD7 TOTAL SCORE: 0
GAD7 TOTAL SCORE: 0

## 2018-01-30 ENCOUNTER — DOCUMENTATION ONLY (OUTPATIENT)
Dept: FAMILY MEDICINE | Facility: OTHER | Age: 53
End: 2018-01-30

## 2018-01-30 PROBLEM — K21.9 GASTROESOPHAGEAL REFLUX DISEASE: Status: ACTIVE | Noted: 2017-01-18

## 2018-01-30 PROBLEM — F40.298 OTHER ISOLATED OR SPECIFIC PHOBIAS: Status: ACTIVE | Noted: 2018-01-30

## 2018-01-30 PROBLEM — L73.2 HIDRADENITIS SUPPURATIVA: Status: ACTIVE | Noted: 2018-01-30

## 2018-01-30 PROBLEM — F41.1 ANXIETY STATE: Status: ACTIVE | Noted: 2018-01-30

## 2018-01-30 PROBLEM — E78.5 DYSLIPIDEMIA: Status: ACTIVE | Noted: 2018-01-30

## 2018-01-30 PROBLEM — E66.9 OBESITY: Status: ACTIVE | Noted: 2018-01-30

## 2018-01-30 PROBLEM — F17.200 TOBACCO USE DISORDER: Status: ACTIVE | Noted: 2018-01-30

## 2018-01-30 PROBLEM — M51.379 DEGENERATIVE DISC DISEASE AT L5-S1 LEVEL: Status: ACTIVE | Noted: 2018-01-30

## 2018-01-30 PROBLEM — J45.30 MILD PERSISTENT ASTHMA WITHOUT COMPLICATION: Status: ACTIVE | Noted: 2018-01-30

## 2018-01-30 RX ORDER — LISINOPRIL 10 MG/1
10 TABLET ORAL DAILY
COMMUNITY
Start: 2017-04-11 | End: 2018-03-18

## 2018-01-30 RX ORDER — ALBUTEROL SULFATE 90 UG/1
2 AEROSOL, METERED RESPIRATORY (INHALATION) EVERY 4 HOURS PRN
COMMUNITY
Start: 2017-05-01 | End: 2018-05-11

## 2018-01-30 RX ORDER — CYCLOBENZAPRINE HCL 10 MG
1 TABLET ORAL
COMMUNITY
Start: 2017-10-18 | End: 2018-05-11

## 2018-01-30 RX ORDER — BENZONATATE 100 MG/1
100 CAPSULE ORAL 3 TIMES DAILY PRN
COMMUNITY
Start: 2017-10-16 | End: 2018-05-11

## 2018-01-30 RX ORDER — ATORVASTATIN CALCIUM 40 MG/1
60 TABLET, FILM COATED ORAL AT BEDTIME
COMMUNITY
Start: 2017-05-01 | End: 2018-05-01

## 2018-01-30 RX ORDER — GABAPENTIN 800 MG/1
800 TABLET ORAL 3 TIMES DAILY
COMMUNITY
Start: 2017-05-01 | End: 2018-03-18

## 2018-01-30 RX ORDER — OXYCODONE HYDROCHLORIDE 5 MG/1
5 TABLET ORAL EVERY 6 HOURS PRN
COMMUNITY
Start: 2017-08-16 | End: 2018-05-11

## 2018-01-30 RX ORDER — BUPROPION HYDROCHLORIDE 150 MG/1
150 TABLET, EXTENDED RELEASE ORAL 2 TIMES DAILY
COMMUNITY
Start: 2017-08-16 | End: 2018-05-11

## 2018-01-30 RX ORDER — PAROXETINE 10 MG/1
10 TABLET, FILM COATED ORAL EVERY MORNING
COMMUNITY
Start: 2017-05-01 | End: 2018-04-28

## 2018-01-30 RX ORDER — FAMOTIDINE 20 MG/1
20 TABLET, FILM COATED ORAL DAILY PRN
COMMUNITY
Start: 2017-01-18 | End: 2018-03-18

## 2018-01-30 RX ORDER — IBUPROFEN 800 MG/1
800 TABLET, FILM COATED ORAL 3 TIMES DAILY PRN
COMMUNITY
Start: 2017-11-15 | End: 2018-05-14

## 2018-03-18 DIAGNOSIS — I10 ESSENTIAL HYPERTENSION: Primary | ICD-10-CM

## 2018-03-18 DIAGNOSIS — K21.9 GASTROESOPHAGEAL REFLUX DISEASE, ESOPHAGITIS PRESENCE NOT SPECIFIED: Primary | ICD-10-CM

## 2018-03-18 DIAGNOSIS — M51.379 DEGENERATIVE DISC DISEASE AT L5-S1 LEVEL: ICD-10-CM

## 2018-03-19 RX ORDER — LIDOCAINE 50 MG/G
OINTMENT TOPICAL
Qty: 60 G | Refills: 0 | OUTPATIENT
Start: 2018-03-19

## 2018-03-19 RX ORDER — LISINOPRIL 10 MG/1
TABLET ORAL
Qty: 90 TABLET | Refills: 1 | Status: SHIPPED | OUTPATIENT
Start: 2018-03-19 | End: 2018-08-15

## 2018-03-19 NOTE — TELEPHONE ENCOUNTER
Lisinopril 10 mg  LOV-11/14/2017  Last BMP-09/17/2017  Prescription refilled per RN Medication RefillPolicy.................... Diana Boucher ....................  3/19/2018   3:01 PM      Refill request for Lidocaine inappropriate. This medication was discontinued on 09/17/2017- Patient states no longer taking. Pharmacy alerted. Unable to complete prescription refill per RNMedication Refill Policy.................... Diana Boucher ....................  3/19/2018   3:04 PM

## 2018-03-22 RX ORDER — FAMOTIDINE 20 MG/1
TABLET, FILM COATED ORAL
Qty: 90 TABLET | Refills: 1 | Status: SHIPPED | OUTPATIENT
Start: 2018-03-22 | End: 2018-05-11

## 2018-03-22 NOTE — TELEPHONE ENCOUNTER
Chart review shows that patient was last seen by PCP on 11/14/17 for an acute issue. However, both rxs as requested were noted in office visit notes on that date as noted below:    famotidine (PEPCID) 20 mg tablet Take 1 tablet by mouth once daily if needed.     gabapentin (NEURONTIN) 800 mg tablet Take 1 tablet by mouth 3 times daily.     No changes to either rx is noted in office visit notes on that date and patient was to follow up as needed. Writer will refill pepcid rx as per RN refill protocol. Will route rx request for gabapentin to PCP for his consideration/approval at this time as gabapentin is not on RN refill protocol.    Prescription refilled per RN Medication Refill Policy..................Armando Gunn 3/22/2018 2:24 PM

## 2018-03-23 RX ORDER — GABAPENTIN 800 MG/1
TABLET ORAL
Qty: 270 TABLET | Refills: 1 | Status: SHIPPED | OUTPATIENT
Start: 2018-03-23 | End: 2018-05-11 | Stop reason: DRUGHIGH

## 2018-03-25 ENCOUNTER — HEALTH MAINTENANCE LETTER (OUTPATIENT)
Age: 53
End: 2018-03-25

## 2018-04-28 DIAGNOSIS — F41.1 ANXIETY STATE: ICD-10-CM

## 2018-04-28 DIAGNOSIS — E78.5 HYPERLIPIDEMIA, UNSPECIFIED HYPERLIPIDEMIA TYPE: Primary | ICD-10-CM

## 2018-04-28 NOTE — LETTER
May 1, 2018      Alejandra Villegas     [facility] [address]        Dear Alejandra,       This is to remind you that you are coming due for your annual appointment with Dipak Welsh. Your last annual medication management visit was on 8/16/17. Additional refills of your medication require you to complete this visit.    Please call [phone] to schedule your appointment.    Thank you for choosing [facility] for your health care needs.    Sincerely,      Refill RN  [facility] [place]

## 2018-05-01 RX ORDER — ATORVASTATIN CALCIUM 40 MG/1
60 TABLET, FILM COATED ORAL AT BEDTIME
Qty: 135 TABLET | Refills: 0 | Status: SHIPPED | OUTPATIENT
Start: 2018-05-01 | End: 2018-05-11

## 2018-05-01 RX ORDER — PAROXETINE 10 MG/1
TABLET, FILM COATED ORAL
Qty: 90 TABLET | Refills: 0 | Status: SHIPPED | OUTPATIENT
Start: 2018-05-01 | End: 2018-07-26

## 2018-05-01 NOTE — TELEPHONE ENCOUNTER
Chart review shows that patient is coming due as of today 5/1/18 for annual labs to support continued use of atorvastatin as requested. Last physical/preop with PCP however is noted to have taken place on 8/16/17 and both rxs as requested were noted and reviewed by PCP as per office visit notes on that date. No changes noted in either rx as requested from pharmacy. Writer will refill both rxs as requested for a limited supply at this time. Will send patient a reminder letter that she is coming due for an annual office visit with PCP in August 2018.    Prescription refilled per RN Medication Refill Policy..................Armando Gunn 5/1/2018 10:02 AM

## 2018-05-04 NOTE — TELEPHONE ENCOUNTER
This is a Refill request from: darian  Name of Medication: cyclobenzaprine (FLEXERIL) 10 MG tablet  TAKE 1 TABLET BY MOUTH AT BEDTIME IF NEEDED FOR MUSCLE SPASM  Quantity requested: 30  Last fill date: 3/18/18  Due for refill: yes  PCP:  Dipak Welsh 11/14/17  Controlled Substance Agreement:  na   Diagnosis r/t this medication request: unsure, [facility]     Unable to completeprescription refill per RN Medication Refill Policy.................... Steffany Bowen ....................  5/4/2018   3:44 PM

## 2018-05-07 RX ORDER — CYCLOBENZAPRINE HCL 10 MG
TABLET ORAL
Qty: 30 TABLET | Refills: 0 | OUTPATIENT
Start: 2018-05-07

## 2018-05-11 ENCOUNTER — OFFICE VISIT (OUTPATIENT)
Dept: FAMILY MEDICINE | Facility: OTHER | Age: 53
End: 2018-05-11
Attending: FAMILY MEDICINE
Payer: MEDICARE

## 2018-05-11 VITALS
WEIGHT: 166 LBS | HEART RATE: 68 BPM | DIASTOLIC BLOOD PRESSURE: 80 MMHG | SYSTOLIC BLOOD PRESSURE: 112 MMHG | BODY MASS INDEX: 27.41 KG/M2

## 2018-05-11 DIAGNOSIS — E78.5 DYSLIPIDEMIA: ICD-10-CM

## 2018-05-11 DIAGNOSIS — J45.30 MILD PERSISTENT ASTHMA WITHOUT COMPLICATION: ICD-10-CM

## 2018-05-11 DIAGNOSIS — R82.90 ABNORMAL URINE: ICD-10-CM

## 2018-05-11 DIAGNOSIS — K21.9 GASTROESOPHAGEAL REFLUX DISEASE, ESOPHAGITIS PRESENCE NOT SPECIFIED: ICD-10-CM

## 2018-05-11 DIAGNOSIS — M51.379 DEGENERATIVE DISC DISEASE AT L5-S1 LEVEL: ICD-10-CM

## 2018-05-11 DIAGNOSIS — E11.9 CONTROLLED TYPE 2 DIABETES MELLITUS WITHOUT COMPLICATION, WITHOUT LONG-TERM CURRENT USE OF INSULIN (H): Primary | ICD-10-CM

## 2018-05-11 LAB
ALBUMIN UR-MCNC: NEGATIVE MG/DL
ANION GAP SERPL CALCULATED.3IONS-SCNC: 5 MMOL/L (ref 3–14)
APPEARANCE UR: ABNORMAL
BACTERIA #/AREA URNS HPF: ABNORMAL /HPF
BILIRUB UR QL STRIP: NEGATIVE
BUN SERPL-MCNC: 12 MG/DL (ref 7–25)
CALCIUM SERPL-MCNC: 9.2 MG/DL (ref 8.6–10.3)
CHLORIDE SERPL-SCNC: 106 MMOL/L (ref 98–107)
CO2 SERPL-SCNC: 26 MMOL/L (ref 21–31)
COLOR UR AUTO: YELLOW
CREAT SERPL-MCNC: 0.72 MG/DL (ref 0.6–1.2)
GFR SERPL CREATININE-BSD FRML MDRD: 86 ML/MIN/1.7M2
GLUCOSE SERPL-MCNC: 92 MG/DL (ref 70–105)
GLUCOSE UR STRIP-MCNC: NEGATIVE MG/DL
HBA1C MFR BLD: 5.8 % (ref 4–6)
HGB UR QL STRIP: ABNORMAL
KETONES UR STRIP-MCNC: NEGATIVE MG/DL
LEUKOCYTE ESTERASE UR QL STRIP: NEGATIVE
NITRATE UR QL: POSITIVE
PH UR STRIP: 6 PH (ref 5–7)
POTASSIUM SERPL-SCNC: 3.7 MMOL/L (ref 3.5–5.1)
RBC #/AREA URNS AUTO: ABNORMAL /HPF
SODIUM SERPL-SCNC: 137 MMOL/L (ref 134–144)
SOURCE: ABNORMAL
SP GR UR STRIP: 1.01 (ref 1–1.03)
UROBILINOGEN UR STRIP-ACNC: 0.2 EU/DL (ref 0.2–1)
VIT B12 SERPL-MCNC: 959 PG/ML (ref 180–914)
WBC #/AREA URNS AUTO: ABNORMAL /HPF

## 2018-05-11 PROCEDURE — 81001 URINALYSIS AUTO W/SCOPE: CPT | Performed by: FAMILY MEDICINE

## 2018-05-11 PROCEDURE — 82043 UR ALBUMIN QUANTITATIVE: CPT | Performed by: FAMILY MEDICINE

## 2018-05-11 PROCEDURE — 99214 OFFICE O/P EST MOD 30 MIN: CPT | Performed by: FAMILY MEDICINE

## 2018-05-11 PROCEDURE — 36415 COLL VENOUS BLD VENIPUNCTURE: CPT | Performed by: FAMILY MEDICINE

## 2018-05-11 PROCEDURE — 80048 BASIC METABOLIC PNL TOTAL CA: CPT | Performed by: FAMILY MEDICINE

## 2018-05-11 PROCEDURE — 82607 VITAMIN B-12: CPT | Performed by: FAMILY MEDICINE

## 2018-05-11 PROCEDURE — 83036 HEMOGLOBIN GLYCOSYLATED A1C: CPT | Performed by: FAMILY MEDICINE

## 2018-05-11 PROCEDURE — G0463 HOSPITAL OUTPT CLINIC VISIT: HCPCS

## 2018-05-11 RX ORDER — ATORVASTATIN CALCIUM 40 MG/1
40 TABLET, FILM COATED ORAL AT BEDTIME
Qty: 90 TABLET | Refills: 4 | Status: SHIPPED | OUTPATIENT
Start: 2018-05-11 | End: 2019-05-12

## 2018-05-11 RX ORDER — ALBUTEROL SULFATE 90 UG/1
2 AEROSOL, METERED RESPIRATORY (INHALATION) EVERY 4 HOURS PRN
Qty: 1 INHALER | Refills: 11 | Status: SHIPPED | OUTPATIENT
Start: 2018-05-11 | End: 2020-03-24

## 2018-05-11 RX ORDER — FAMOTIDINE 20 MG/1
20 TABLET, FILM COATED ORAL 2 TIMES DAILY
Qty: 180 TABLET | Refills: 1 | Status: SHIPPED | OUTPATIENT
Start: 2018-05-11 | End: 2018-11-27

## 2018-05-11 RX ORDER — AMITRIPTYLINE HYDROCHLORIDE 10 MG/1
TABLET ORAL
Qty: 90 TABLET | Refills: 11 | Status: SHIPPED | OUTPATIENT
Start: 2018-05-11 | End: 2018-12-06

## 2018-05-11 RX ORDER — CYCLOBENZAPRINE HCL 10 MG
10 TABLET ORAL
Qty: 42 TABLET | Status: CANCELLED | OUTPATIENT
Start: 2018-05-11

## 2018-05-11 RX ORDER — GABAPENTIN 400 MG/1
400 CAPSULE ORAL 3 TIMES DAILY
Qty: 270 CAPSULE | Refills: 4 | Status: SHIPPED | OUTPATIENT
Start: 2018-05-11 | End: 2019-06-07

## 2018-05-11 NOTE — LETTER
May 17, 2018      Alejandra Villegas     [place] [address]        Dear ,    We are writing to inform you of your test results. Everything looks good. Diabetes is controlled. No protein leakage is occurring from your kidneys. Electrolytes and kidney tests are normal.  B12 is normal range.     Resulted Orders   Hemoglobin A1c   Result Value Ref Range    Hemoglobin A1C 5.8 4.0 - 6.0 %   Basic metabolic panel   Result Value Ref Range    Sodium 137 134 - 144 mmol/L    Potassium 3.7 3.5 - 5.1 mmol/L    Chloride 106 98 - 107 mmol/L    Carbon Dioxide 26 21 - 31 mmol/L    Anion Gap 5 3 - 14 mmol/L    Glucose 92 70 - 105 mg/dL    Urea Nitrogen 12 7 - 25 mg/dL    Creatinine 0.72 0.60 - 1.20 mg/dL    GFR Estimate 86 >60 mL/min/1.7m2    GFR Estimate If Black >90 >60 mL/min/1.7m2    Calcium 9.2 8.6 - 10.3 mg/dL   Vitamin B12   Result Value Ref Range    Vitamin B12 959 (H) 180 - 914 pg/mL   Albumin Random Urine Quantitative with Creat Ratio   Result Value Ref Range    Creatinine Urine 81 mg/dL    Albumin Urine mg/L 6 mg/L    Albumin Urine mg/g Cr 7.00 0 - 25 mg/g Cr       If you have any questions or concerns, please call the clinic at the number listed above.       Sincerely,    Dipak Welsh MD  Electronically signed

## 2018-05-11 NOTE — PROGRESS NOTES
Nursing Notes:   Martita Bishop LPN  5/11/2018  2:00 PM  Unsigned  Patient  presenting today for medication management  Martita Bishop LPN 5/11/2018 1:54 PM   Previous A1C is at goal of <8  5.1 7/3/17  Urine microalbumin:creatine: 0.96  Foot exam 8/16/17  Eye exam DUE    Tobacco User YES  Patient is not on a daily aspirin  Patient is on a Statin.  Blood pressure today of:     BP Readings from Last 1 Encounters:   05/11/18 112/80      is at the goal of <139/89 for diabetics.    Martita Bishop LPN on 5/11/2018 at 1:59 PM    SUBJECTIVE:  52 year old female presents to follow up on diabetes, GERD, dyslipidemia, chronic LBP, and asthma.    On metformin 500 mg daily and tolerating. Checks blood sugars in the morning sometimes and range is around 110.  Please A1c may be slightly higher than usual.    Remains on atorvastatin at 60 mg daily.  She is not sure why she takes this dose versus either 40 or 80 mg.  Has been doing this for a few years.    Takes Paxil 10 mg daily for anxiety. Working well.    Tried using famotidine to help with heartburn.  Taking it once a day still results in heartburn.  She used to be on a PPI.    Would like less gabapentin for low back pain.  Taking 800 mg 3 times a day is too much.  Using Flexeril for sleep each night to help with her back pain.  Does not recall trying amitriptyline previously.    Uses albuterol as needed for asthma symptoms.  Continues to smoke without plans to quit.      REVIEW OF SYSTEMS:    Pertinent items are noted in HPI.    Current Outpatient Prescriptions   Medication Sig Dispense Refill     albuterol (PROAIR HFA/PROVENTIL HFA/VENTOLIN HFA) 108 (90 BASE) MCG/ACT Inhaler Inhale 2 puffs into the lungs every 4 hours as needed       atorvastatin (LIPITOR) 40 MG tablet Take 1.5 tablets (60 mg) by mouth At Bedtime 135 tablet 0     cyclobenzaprine (FLEXERIL) 10 MG tablet Take 1 tablet by mouth nightly as needed       famotidine (PEPCID) 20 MG tablet TAKE 1 TABLET  BY MOUTH EVERY DAY AS NEEDED 90 tablet 1     gabapentin (NEURONTIN) 800 MG tablet TAKE 1 TABLET BY MOUTH THREE TIMES DAILY 270 tablet 1     ibuprofen (ADVIL/MOTRIN) 800 MG tablet Take 800 mg by mouth 3 times daily as needed       lisinopril (PRINIVIL/ZESTRIL) 10 MG tablet TAKE 1 TABLET BY MOUTH EVERY DAY 90 tablet 1     metFORMIN (GLUCOPHAGE) 500 MG tablet Take 500 mg by mouth Once daily with a meal       PARoxetine (PAXIL) 10 MG tablet TAKE 1 TABLET BY MOUTH EVERY MORNING 90 tablet 0     [DISCONTINUED] metFORMIN osmotic (FORTAMET) 500 MG TB24 Take 500 mg by mouth daily       [DISCONTINUED] PARoxetine HCl (PAXIL PO) Take 10 mg by mouth daily       Allergies   Allergen Reactions     Adhesive Tape      Bee Pollen Swelling     Seasonal     Codeine      Other reaction(s): Headache  Tylenol #3     Folic Acid Itching     Amoxicillin Rash     Liquid Adhesive Rash       OBJECTIVE:  /80 (BP Location: Right arm, Patient Position: Sitting, Cuff Size: Adult Regular)  Pulse 68  Wt 166 lb (75.3 kg)  Breastfeeding? No  BMI 27.41 kg/m2    EXAM:  General Appearance: Alert. No acute distress  Chest/Respiratory Exam: Clear to auscultation bilaterally  Cardiovascular Exam: Regular rate and rhythm. S1, S2, no murmur, gallop, or rubs.  Extremities: No lower extremity edema.  Foot Exam: Left and right foot: monofilament sensation normal, good pedal pulses, no lesions, nail hygiene good.    Results for orders placed or performed in visit on 05/11/18   Hemoglobin A1c   Result Value Ref Range    Hemoglobin A1C 5.8 4.0 - 6.0 %   Basic metabolic panel   Result Value Ref Range    Sodium 137 134 - 144 mmol/L    Potassium 3.7 3.5 - 5.1 mmol/L    Chloride 106 98 - 107 mmol/L    Carbon Dioxide 26 21 - 31 mmol/L    Anion Gap 5 3 - 14 mmol/L    Glucose 92 70 - 105 mg/dL    Urea Nitrogen 12 7 - 25 mg/dL    Creatinine 0.72 0.60 - 1.20 mg/dL    GFR Estimate 86 >60 mL/min/1.7m2    GFR Estimate If Black >90 >60 mL/min/1.7m2    Calcium 9.2 8.6 -  10.3 mg/dL   Vitamin B12   Result Value Ref Range    Vitamin B12 959 (H) 180 - 914 pg/mL   Albumin Random Urine Quantitative with Creat Ratio   Result Value Ref Range    Creatinine Urine 81 mg/dL    Albumin Urine mg/L 6 mg/L    Albumin Urine mg/g Cr 7.00 0 - 25 mg/g Cr   Urinalysis w Reflex Microscopic If Positive   Result Value Ref Range    Color Urine Yellow     Appearance Urine Slightly Cloudy     Glucose Urine Negative NEG^Negative mg/dL    Bilirubin Urine Negative NEG^Negative    Ketones Urine Negative NEG^Negative mg/dL    Specific Gravity Urine 1.015 1.003 - 1.035    Blood Urine Small (A) NEG^Negative    pH Urine 6.0 5.0 - 7.0 pH    Protein Albumin Urine Negative NEG^Negative mg/dL    Urobilinogen Urine 0.2 0.2 - 1.0 EU/dL    Nitrite Urine Positive (A) NEG^Negative    Leukocyte Esterase Urine Negative NEG^Negative    Source Midstream Urine    Urine Microscopic   Result Value Ref Range    WBC Urine 0 - 5 OTO5^0 - 5 /HPF    RBC Urine 2-5 (A) OTO2^O - 2 /HPF    Bacteria Urine Many (A) NEG^Negative /HPF      ASSESSMENT/PLAN:    ICD-10-CM    1. Controlled type 2 diabetes mellitus without complication, without long-term current use of insulin (H) E11.9 Hemoglobin A1c     metFORMIN (GLUCOPHAGE) 500 MG tablet     Basic metabolic panel     Vitamin B12     Albumin Random Urine Quantitative with Creat Ratio   2. Dyslipidemia E78.5 atorvastatin (LIPITOR) 40 MG tablet     Urine Microscopic   3. Gastroesophageal reflux disease, esophagitis presence not specified K21.9 famotidine (PEPCID) 20 MG tablet   4. Degenerative disc disease at L5-S1 level M51.36 amitriptyline (ELAVIL) 10 MG tablet     gabapentin (NEURONTIN) 400 MG capsule   5. Mild persistent asthma without complication J45.30 albuterol (PROAIR HFA/PROVENTIL HFA/VENTOLIN HFA) 108 (90 Base) MCG/ACT Inhaler   6. Abnormal urine R82.90 Urinalysis w Reflex Microscopic If Positive       A1c shows good diabetes control.  Refilled metformin.  Check a B12 level due to  chronic metformin use and is normal.      Discussed that previously LDL goal was at least below 100 with diabetes, but now idea is that she should take a moderate or high intensity statin. Can reduce atorvastatin to 40 mg daily.    Refilled famotidine, can take twice daily and this should control GERD.  Still better than being on a PPI.    Recommend she stop using Flexeril.  No evidence for long-term benefit.  Can try amitriptyline instead of taking 10-30 mg at night to help with pain and sleep.  If not beneficial, we can increase the dose and should follow-up in about a month.  Reduce gabapentin from 800 mg 3 times daily to 400 mg 3 times daily.    Refilled albuterol inhaler    Urine spelled like nitrate and so a urinalysis was added.  It appears more like chronic bacteriuria than a UTI.  She did not have any symptoms.    F/U 1 mo    Dipak Welsh MD    This document was prepared using a combination of typing and voice generated software.  While every attempt was made for accuracy, spelling and grammatical errors may exist.

## 2018-05-11 NOTE — MR AVS SNAPSHOT
After Visit Summary   5/11/2018    Alejandra Villegas    MRN: [id number]           Patient Information     Date Of Birth          1965        Visit Information        Provider Department      5/11/2018 1:45 PM Dipak Welsh MD [facility]        Today's Diagnoses     Dyslipidemia    -  1    Gastroesophageal reflux disease, esophagitis presence not specified        Controlled type 2 diabetes mellitus without complication, without long-term current use of insulin (H)        Degenerative disc disease at L5-S1 level        Mild persistent asthma without complication          Care Instructions    Decrease atorvastatin to 40 mg daily  Increase famotidine to twice daily   Decrease gabapentin to 400 mg three times daily  Use amitriptyline at night to help with sleep. Increase from 10 up to 30 mg if able.   Letter with lab results          Follow-ups after your visit        Your next 10 appointments already scheduled     Aug 15, 2018  1:15 PM CDT   Return Visit with Rl Ambriz MD   [facility] ([facility])    [address]  [facility] [id number] [phone]              Who to contact     If you have questions or need follow up information about today's clinic visit or your schedule please contact [facility] directly at [phone].  Normal or non-critical lab and imaging results will be communicated to you by MyChart, letter or phone within 4 business days after the clinic has received the results. If you do not hear from us within 7 days, please contact the clinic through [employer] or phone. If you have a critical or abnormal lab result, we will notify you by phone as soon as possible.  Submit refill requests through [employer] or call your pharmacy and they will forward the refill request to us. Please allow 3 business days for your refill to be completed.          Additional Information About Your  "Visit        [employer] Information     [employer] lets you send messages to your doctor, view your test results, renew your prescriptions, schedule appointments and more. To sign up, go to [email or web address] . Click on \"Log in\" on the left side of the screen, which will take you to the Welcome page. Then click on \"Sign up Now\" on the right side of the page.     You will be asked to enter the access code listed below, as well as some personal information. Please follow the directions to create your username and password.     Your access code is: [email or web address]  Expires: 2018  2:25 PM     Your access code will  in 90 days. If you need help or a new code, please call your [place] clinic or [phone].        Care EveryWhere ID     This is your Care EveryWhere ID. This could be used by other organizations to access your [place] medical records  [id number]        Your Vitals Were     Pulse Breastfeeding? BMI (Body Mass Index)             68 No 27.41 kg/m2          Blood Pressure from Last 3 Encounters:   18 112/80   17 92/60   10/16/17 118/66    Weight from Last 3 Encounters:   18 166 lb (75.3 kg)   17 163 lb (73.9 kg)   17 163 lb 3.2 oz (74 kg)              We Performed the Following     Basic metabolic panel     Hemoglobin A1c     Vitamin B12          Today's Medication Changes          These changes are accurate as of 18  2:25 PM.  If you have any questions, ask your nurse or doctor.               Start taking these medicines.        Dose/Directions    amitriptyline 10 MG tablet   Commonly known as:  ELAVIL   Used for:  Degenerative disc disease at L5-S1 level   Started by:  Dipak Welsh MD        Start at 1 tab at bedtime for 3 days, then 2 tabs at bedtime for 3 days, then 3 tabs at bedtime.   Quantity:  90 tablet   Refills:  11       gabapentin 400 MG capsule   Commonly known as:  NEURONTIN   Used for:  Degenerative disc disease at L5-S1 level   Replaces:  " gabapentin 800 MG tablet   Started by:  Dipak Welsh MD        Dose:  400 mg   Take 1 capsule (400 mg) by mouth 3 times daily   Quantity:  270 capsule   Refills:  4         These medicines have changed or have updated prescriptions.        Dose/Directions    atorvastatin 40 MG tablet   Commonly known as:  LIPITOR   This may have changed:  how much to take   Used for:  Dyslipidemia   Changed by:  Dipak Welsh MD        Dose:  40 mg   Take 1 tablet (40 mg) by mouth At Bedtime   Quantity:  90 tablet   Refills:  4       famotidine 20 MG tablet   Commonly known as:  PEPCID   This may have changed:  See the new instructions.   Used for:  Gastroesophageal reflux disease, esophagitis presence not specified   Changed by:  Dipak Welsh MD        Dose:  20 mg   Take 1 tablet (20 mg) by mouth 2 times daily   Quantity:  180 tablet   Refills:  1       metFORMIN 500 MG tablet   Commonly known as:  GLUCOPHAGE   This may have changed:    - when to take this  - Another medication with the same name was removed. Continue taking this medication, and follow the directions you see here.   Used for:  Controlled type 2 diabetes mellitus without complication, without long-term current use of insulin (H)   Changed by:  Dipak Welsh MD        Dose:  500 mg   Take 1 tablet (500 mg) by mouth daily (with breakfast) Once daily with a meal   Quantity:  90 tablet   Refills:  4       PARoxetine 10 MG tablet   Commonly known as:  PAXIL   This may have changed:  Another medication with the same name was removed. Continue taking this medication, and follow the directions you see here.   Used for:  Anxiety state   Changed by:  Dipak Welsh MD        TAKE 1 TABLET BY MOUTH EVERY MORNING   Quantity:  90 tablet   Refills:  0         Stop taking these medicines if you haven't already. Please contact your care team if you have questions.     buPROPion 150 MG 12 hr tablet   Commonly known as:  WELLBUTRIN SR   Stopped by:   Dipak Welsh MD           cyclobenzaprine 10 MG tablet   Commonly known as:  FLEXERIL   Stopped by:  Dipak Welsh MD           gabapentin 800 MG tablet   Commonly known as:  NEURONTIN   Replaced by:  gabapentin 400 MG capsule   Stopped by:  Dipak Welsh MD                Where to get your medicines      These medications were sent to [id number] - [place], [place] - [address]  [address], [place] [address]     Phone:  [phone]     albuterol 108 (90 Base) MCG/ACT Inhaler    amitriptyline 10 MG tablet    atorvastatin 40 MG tablet    famotidine 20 MG tablet    gabapentin 400 MG capsule    metFORMIN 500 MG tablet                Primary Care Provider Office Phone # Fax #    Dipak Welsh [phone]       [address]  [facility] [address]        Equal Access to Services     [facility]: Hadii prashanth fuchs hadasho Sotika, waaxda luqadaha, qaybta kaalmada adeegyada, charley schaefer haylamont dozier . [facility] [phone].    ATENCIÓN: Si habla español, tiene a villagomez disposición servicios gratuitos de asistencia lingüística. [facility] [phone].    We comply with applicable federal civil rights laws and [place] laws. We do not discriminate on the basis of race, color, national origin, age, disability, sex, sexual orientation, or gender identity.            Thank you!     Thank you for choosing [facility] AND [facility]  for your care. Our goal is always to provide you with excellent care. Hearing back from our patients is one way we can continue to improve our services. Please take a few minutes to complete the written survey that you may receive in the mail after your visit with us. Thank you!             Your Updated Medication List - Protect others around you: Learn how to safely use, store and throw away your medicines at www.disposemymeds.org.          This list is accurate as of 5/11/18  2:25 PM.   Always use your most recent med list.                   Brand Name Dispense Instructions for use Diagnosis    albuterol 108 (90 Base) MCG/ACT Inhaler    PROAIR HFA/PROVENTIL HFA/VENTOLIN HFA    1 Inhaler    Inhale 2 puffs into the lungs every 4 hours as needed    Mild persistent asthma without complication       amitriptyline 10 MG tablet    ELAVIL    90 tablet    Start at 1 tab at bedtime for 3 days, then 2 tabs at bedtime for 3 days, then 3 tabs at bedtime.    Degenerative disc disease at L5-S1 level       atorvastatin 40 MG tablet    LIPITOR    90 tablet    Take 1 tablet (40 mg) by mouth At Bedtime    Dyslipidemia       famotidine 20 MG tablet    PEPCID    180 tablet    Take 1 tablet (20 mg) by mouth 2 times daily    Gastroesophageal reflux disease, esophagitis presence not specified       gabapentin 400 MG capsule    NEURONTIN    270 capsule    Take 1 capsule (400 mg) by mouth 3 times daily    Degenerative disc disease at L5-S1 level       ibuprofen 800 MG tablet    ADVIL/MOTRIN     Take 800 mg by mouth 3 times daily as needed        lisinopril 10 MG tablet    PRINIVIL/ZESTRIL    90 tablet    TAKE 1 TABLET BY MOUTH EVERY DAY    Essential hypertension       metFORMIN 500 MG tablet    GLUCOPHAGE    90 tablet    Take 1 tablet (500 mg) by mouth daily (with breakfast) Once daily with a meal    Controlled type 2 diabetes mellitus without complication, without long-term current use of insulin (H)       PARoxetine 10 MG tablet    PAXIL    90 tablet    TAKE 1 TABLET BY MOUTH EVERY MORNING    Anxiety state

## 2018-05-11 NOTE — NURSING NOTE
Patient  presenting today for medication management  Martita Bishop LPN 5/11/2018 1:54 PM   Previous A1C is at goal of <8  5.1 7/3/17  Urine microalbumin:creatine: 0.96  Foot exam 8/16/17  Eye exam DUE    Tobacco User YES  Patient is not on a daily aspirin  Patient is on a Statin.  Blood pressure today of:     BP Readings from Last 1 Encounters:   05/11/18 112/80      is at the goal of <139/89 for diabetics.    Martita Bishop LPN on 5/11/2018 at 1:59 PM

## 2018-05-11 NOTE — PATIENT INSTRUCTIONS
Decrease atorvastatin to 40 mg daily  Increase famotidine to twice daily   Decrease gabapentin to 400 mg three times daily  Use amitriptyline at night to help with sleep. Increase from 10 up to 30 mg if able.   Letter with lab results

## 2018-05-14 DIAGNOSIS — I10 ESSENTIAL HYPERTENSION: Primary | ICD-10-CM

## 2018-05-14 LAB
CREAT UR-MCNC: 81 MG/DL
MICROALBUMIN UR-MCNC: 6 MG/L
MICROALBUMIN/CREAT UR: 7 MG/G CR (ref 0–25)

## 2018-05-15 RX ORDER — BUPROPION HYDROCHLORIDE 150 MG/1
TABLET, EXTENDED RELEASE ORAL
Qty: 60 TABLET | Refills: 0 | OUTPATIENT
Start: 2018-05-15

## 2018-05-15 NOTE — TELEPHONE ENCOUNTER
Chart review shows that Rx as requested by pharmacy was discontinued in patient's chart on 5/11/18 by PCP. Patient was seen by PCP on that date, and office visit notes on that date confirm that Rx was indeed discontinued. Writer will refuse Rx request at this time, and will make note of above in Rx refusal to pharmacy.    Unable to complete prescription refill per RN Medication Refill Policy. Armando Gunn 5/15/2018 3:17 PM

## 2018-05-18 RX ORDER — IBUPROFEN 800 MG/1
TABLET, FILM COATED ORAL
Qty: 270 TABLET | Refills: 0 | Status: SHIPPED | OUTPATIENT
Start: 2018-05-18 | End: 2018-08-19

## 2018-05-18 NOTE — TELEPHONE ENCOUNTER
Last AST and ALT completed on 9/17/17 and were within normal limits.     LOV with PCP-5/11/18 for medication management. Rx as requested was noted in office visit notes on that date. No changes noted. Patient is to follow up in one months time. Writer will refill Rx as requested for a 90 day supply at this time.    Prescription refilled per RN Medication Refill Policy..................Armando Gunn 5/18/2018 12:00 PM

## 2018-07-23 ENCOUNTER — OFFICE VISIT (OUTPATIENT)
Dept: FAMILY MEDICINE | Facility: OTHER | Age: 53
End: 2018-07-23
Attending: NURSE PRACTITIONER
Payer: MEDICARE

## 2018-07-23 ENCOUNTER — TELEPHONE (OUTPATIENT)
Dept: UROLOGY | Facility: OTHER | Age: 53
End: 2018-07-23

## 2018-07-23 VITALS
BODY MASS INDEX: 28.04 KG/M2 | DIASTOLIC BLOOD PRESSURE: 72 MMHG | HEART RATE: 62 BPM | SYSTOLIC BLOOD PRESSURE: 106 MMHG | TEMPERATURE: 98 F | WEIGHT: 169.8 LBS

## 2018-07-23 DIAGNOSIS — R68.84 JAW PAIN: Primary | ICD-10-CM

## 2018-07-23 DIAGNOSIS — M26.609 TMJ (TEMPOROMANDIBULAR JOINT SYNDROME): ICD-10-CM

## 2018-07-23 PROCEDURE — G0463 HOSPITAL OUTPT CLINIC VISIT: HCPCS

## 2018-07-23 PROCEDURE — 99213 OFFICE O/P EST LOW 20 MIN: CPT | Performed by: NURSE PRACTITIONER

## 2018-07-23 RX ORDER — METHOCARBAMOL 500 MG/1
500 TABLET, FILM COATED ORAL
Qty: 5 TABLET | Refills: 0 | Status: SHIPPED | OUTPATIENT
Start: 2018-07-23 | End: 2018-08-15

## 2018-07-23 RX ORDER — GABAPENTIN 800 MG/1
TABLET ORAL
Refills: 1 | COMMUNITY
Start: 2018-04-30 | End: 2018-08-15

## 2018-07-23 RX ORDER — PREDNISONE 20 MG/1
40 TABLET ORAL DAILY
Qty: 10 TABLET | Refills: 0 | Status: SHIPPED | OUTPATIENT
Start: 2018-07-23 | End: 2018-07-28

## 2018-07-23 RX ORDER — ATORVASTATIN CALCIUM 40 MG/1
40 TABLET, FILM COATED ORAL
COMMUNITY
End: 2018-08-27

## 2018-07-23 ASSESSMENT — PAIN SCALES - GENERAL: PAINLEVEL: SEVERE PAIN (6)

## 2018-07-23 NOTE — PATIENT INSTRUCTIONS
TMJ Syndrome  The temporomandibular joint (TMJ) is the joint that connects your lower jaw to your head. You can feel it in front of your ears when you open and close your mouth. TMJ disorders involve chronic or recurrent pain in the joint. When treated, symptoms of TMJ disorders usually go away within a few months.  Causes  There is no widely agreed-on cause of TMJ disorders. They have been linked to injury, arthritis, chronic fatigue syndrome, and fibromyalgia. A definite connection has not been shown, though.  Symptoms    Pain in the face, jaw, or neck    Pain with jaw movement or chewing    Locking or catching sensation of the jaw    Clicking, popping, or grinding sounds with movement of the TMJ    Headache    Ear pain  Home care  Modest, nonsurgical treatments are a good first step toward relieving symptoms. Try the approaches described below.    Rest the jaw by avoiding crunchy or hard-to-chew foods. Don t eat hard or sticky candies. Soft foods and liquids are easier on the jaw.    Protect your jaw while yawning. If you need to yawn, put your fist under your chin to prevent your mouth from opening up too wide.    To help relieve pain, try applying hot or cold packs to the painful area. Try both hot and cold to find out which works best for you. To make a cold pack, put ice cubes in a plastic bag that seals at the top. Wrap the bag in a clean, thin towel or cloth. Never put ice or an ice pack directly on the skin. If you use hot packs (small towels soaked in hot water), be careful not to burn yourself.    You may take acetaminophen or ibuprofen for pain, unless you were given a different pain medicine. (Note: If you have chronic liver or kidney disease or have ever had a stomach ulcer or gastrointestinal bleeding, talk with your healthcare provider before using these medicines. Also talk to your provider if you are taking medicine to prevent blood clots.) Don t give aspirin to a child younger than age 19  unless directed by the child s provider. Taking aspirin can put a child at risk for Reye syndrome. This is a rare but very serious disorder that most often affects the brain and the liver.  Reducing stress  If stress seems to be contributing to your symptoms, try to identify the sources of stress in your life. These aren t always obvious. Common stressors include:    Everyday hassles. These include things such as traffic jams, missed appointments, or car trouble.    Major life changes. These can be good, such as a new baby or job promotion. And they can be bad, such as losing a job or losing a loved one.    Overload. The feeling that you have too many responsibilities and can't take care of everything at once.    Helplessness. Feeling like your problems are more than you can solve.  When possible, do something about your sources of stress. See if you can avoid hassles, limit the amount of change in your life at one time, and take breaks when you feel overloaded.  Unfortunately, many stressful situations cannot be avoided. So learning how to manage stress better is very important. Getting regular exercise, eating nutritious, balanced meals, and getting adequate rest all help to make everyday stress more manageable. Certain techniques are also helpful: relaxation and breathing exercises, visualization, biofeedback, meditation, or simply taking some time out to clear your mind. For more information, talk with your healthcare provider.  Follow-up care  Follow up with your healthcare provider, or as advised. Further testing and additional treatment may be required. If changes to your lifestyle do not improve your symptoms, talk with your healthcare provider about other available therapies. These include bite guards for help with teeth grinding, stress management techniques, and more. If stress is an important factor and does not respond to the above simple measures, talk with your healthcare provider about a referral for  stress management.  If X-rays were done, they will be reviewed by a specialist. You will be notified of the results, especially if they affect treatment.  Call 911  Call 911 if any of these occur:    Trouble breathing or swallowing, wheezing    Confusion    Extreme drowsiness or trouble awakening    Fainting or loss of consciousness    Rapid heart rate  When to seek medical advice  Call your healthcare provider right away if any of these occur:    Swollen or red face    Pain gets worse    Neck, mouth, tooth, or throat pain gets worse    Fever of 100.4 F (38 C) or higher, or as directed by your healthcare provider

## 2018-07-23 NOTE — MR AVS SNAPSHOT
After Visit Summary   7/23/2018    Alejandra Villegas    MRN: [id number]           Patient Information     Date Of Birth          1965        Visit Information        Provider Department      7/23/2018 1:15 PM Julia Cullen NP [facility]        Today's Diagnoses     Jaw pain    -  1    TMJ (temporomandibular joint syndrome)          Care Instructions      TMJ Syndrome  The temporomandibular joint (TMJ) is the joint that connects your lower jaw to your head. You can feel it in front of your ears when you open and close your mouth. TMJ disorders involve chronic or recurrent pain in the joint. When treated, symptoms of TMJ disorders usually go away within a few months.  Causes  There is no widely agreed-on cause of TMJ disorders. They have been linked to injury, arthritis, chronic fatigue syndrome, and fibromyalgia. A definite connection has not been shown, though.  Symptoms    Pain in the face, jaw, or neck    Pain with jaw movement or chewing    Locking or catching sensation of the jaw    Clicking, popping, or grinding sounds with movement of the TMJ    Headache    Ear pain  Home care  Modest, nonsurgical treatments are a good first step toward relieving symptoms. Try the approaches described below.    Rest the jaw by avoiding crunchy or hard-to-chew foods. Don t eat hard or sticky candies. Soft foods and liquids are easier on the jaw.    Protect your jaw while yawning. If you need to yawn, put your fist under your chin to prevent your mouth from opening up too wide.    To help relieve pain, try applying hot or cold packs to the painful area. Try both hot and cold to find out which works best for you. To make a cold pack, put ice cubes in a plastic bag that seals at the top. Wrap the bag in a clean, thin towel or cloth. Never put ice or an ice pack directly on the skin. If you use hot packs (small towels soaked in hot water), be careful not to burn yourself.    You may  take acetaminophen or ibuprofen for pain, unless you were given a different pain medicine. (Note: If you have chronic liver or kidney disease or have ever had a stomach ulcer or gastrointestinal bleeding, talk with your healthcare provider before using these medicines. Also talk to your provider if you are taking medicine to prevent blood clots.) Don t give aspirin to a child younger than age 19 unless directed by the child s provider. Taking aspirin can put a child at risk for Reye syndrome. This is a rare but very serious disorder that most often affects the brain and the liver.  Reducing stress  If stress seems to be contributing to your symptoms, try to identify the sources of stress in your life. These aren t always obvious. Common stressors include:    Everyday hassles. These include things such as traffic jams, missed appointments, or car trouble.    Major life changes. These can be good, such as a new baby or job promotion. And they can be bad, such as losing a job or losing a loved one.    Overload. The feeling that you have too many responsibilities and can't take care of everything at once.    Helplessness. Feeling like your problems are more than you can solve.  When possible, do something about your sources of stress. See if you can avoid hassles, limit the amount of change in your life at one time, and take breaks when you feel overloaded.  Unfortunately, many stressful situations cannot be avoided. So learning how to manage stress better is very important. Getting regular exercise, eating nutritious, balanced meals, and getting adequate rest all help to make everyday stress more manageable. Certain techniques are also helpful: relaxation and breathing exercises, visualization, biofeedback, meditation, or simply taking some time out to clear your mind. For more information, talk with your healthcare provider.  Follow-up care  Follow up with your healthcare provider, or as advised. Further testing and  additional treatment may be required. If changes to your lifestyle do not improve your symptoms, talk with your healthcare provider about other available therapies. These include bite guards for help with teeth grinding, stress management techniques, and more. If stress is an important factor and does not respond to the above simple measures, talk with your healthcare provider about a referral for stress management.  If X-rays were done, they will be reviewed by a specialist. You will be notified of the results, especially if they affect treatment.  Call 911  Call 911 if any of these occur:    Trouble breathing or swallowing, wheezing    Confusion    Extreme drowsiness or trouble awakening    Fainting or loss of consciousness    Rapid heart rate  When to seek medical advice  Call your healthcare provider right away if any of these occur:    Swollen or red face    Pain gets worse    Neck, mouth, tooth, or throat pain gets worse    Fever of 100.4 F (38 C) or higher, or as directed by your healthcare provider                  Follow-ups after your visit        Your next 10 appointments already scheduled     Jul 24, 2018  8:45 AM CDT   Nurse Only with  UROLOGY NURSE   [facility] ([facility])    [address]  [facility] [phone]              Who to contact     If you have questions or need follow up information about today's clinic visit or your schedule please contact [facility] directly at [phone].  Normal or non-critical lab and imaging results will be communicated to you by [employer], letter or phone within 4 business days after the clinic has received the results. If you do not hear from us within 7 days, please contact the clinic through MyChart or phone. If you have a critical or abnormal lab result, we will notify you by phone as soon as possible.  Submit refill requests through [employer] or call your pharmacy and  "they will forward the refill request to us. Please allow 3 business days for your refill to be completed.          Additional Information About Your Visit        [email or web address] Information     [employer] lets you send messages to your doctor, view your test results, renew your prescriptions, schedule appointments and more. To sign up, go to [email or web address] . Click on \"Log in\" on the left side of the screen, which will take you to the Welcome page. Then click on \"Sign up Now\" on the right side of the page.     You will be asked to enter the access code listed below, as well as some personal information. Please follow the directions to create your username and password.     Your access code is: [email or web address]  Expires: 2018  2:25 PM     Your access code will  in 90 days. If you need help or a new code, please call your [place] clinic or [phone].        Care EveryWhere ID     This is your Care EveryWhere ID. This could be used by other organizations to access your [place] medical records  [id number]        Your Vitals Were     Pulse Temperature Breastfeeding? BMI (Body Mass Index)          62 98  F (36.7  C) (Tympanic) No 28.04 kg/m2         Blood Pressure from Last 3 Encounters:   18 106/72   18 112/80   17 92/60    Weight from Last 3 Encounters:   18 169 lb 12.8 oz (77 kg)   18 166 lb (75.3 kg)   17 163 lb (73.9 kg)              Today, you had the following     No orders found for display         Today's Medication Changes          These changes are accurate as of 18  2:02 PM.  If you have any questions, ask your nurse or doctor.               Start taking these medicines.        Dose/Directions    methocarbamol 500 MG tablet   Commonly known as:  ROBAXIN   Used for:  TMJ (temporomandibular joint syndrome), Jaw pain   Started by:  Julia Cullen NP        Dose:  500 mg   Take 1 tablet (500 mg) by mouth nightly as needed for muscle spasms   Quantity:  5 tablet "   Refills:  0       predniSONE 20 MG tablet   Commonly known as:  DELTASONE   Used for:  Jaw pain, TMJ (temporomandibular joint syndrome)   Started by:  Julia Cullen NP        Dose:  40 mg   Take 2 tablets (40 mg) by mouth daily for 5 days   Quantity:  10 tablet   Refills:  0            Where to get your medicines      These medications were sent to [id number] - [place], [place] - [address]  [address], [facility] [address]     Phone:  [phone]     methocarbamol 500 MG tablet    predniSONE 20 MG tablet                Primary Care Provider Office Phone # Fax #    Dipak Welsh [phone]       [address]  [facility] [address]        Equal Access to Services     MAXIMO ORTIZ : Hadii aad ku hadasho Soomaali, waaxda luqadaha, qaybta kaalmada adeegyada, charley dozier . So [facility] [phone].    ATENCIÓN: Si habla español, tiene a villagomez disposición servicios gratuitos de asistencia lingüística. Llame al [phone].    We comply with applicable federal civil rights laws and [place] laws. We do not discriminate on the basis of race, color, national origin, age, disability, sex, sexual orientation, or gender identity.            Thank you!     Thank you for choosing [facility] AND [facility]  for your care. Our goal is always to provide you with excellent care. Hearing back from our patients is one way we can continue to improve our services. Please take a few minutes to complete the written survey that you may receive in the mail after your visit with us. Thank you!             Your Updated Medication List - Protect others around you: Learn how to safely use, store and throw away your medicines at www.disposemymeds.org.          This list is accurate as of 7/23/18  2:02 PM.  Always use your most recent med list.                   Brand Name Dispense Instructions for use Diagnosis    albuterol 108  (90 Base) MCG/ACT Inhaler    PROAIR HFA/PROVENTIL HFA/VENTOLIN HFA    1 Inhaler    Inhale 2 puffs into the lungs every 4 hours as needed    Mild persistent asthma without complication       amitriptyline 10 MG tablet    ELAVIL    90 tablet    Start at 1 tab at bedtime for 3 days, then 2 tabs at bedtime for 3 days, then 3 tabs at bedtime.    Degenerative disc disease at L5-S1 level       * atorvastatin 40 MG tablet    LIPITOR     Take 40 mg by mouth        * atorvastatin 40 MG tablet    LIPITOR    90 tablet    Take 1 tablet (40 mg) by mouth At Bedtime    Dyslipidemia       famotidine 20 MG tablet    PEPCID    180 tablet    Take 1 tablet (20 mg) by mouth 2 times daily    Gastroesophageal reflux disease, esophagitis presence not specified       * gabapentin 800 MG tablet    NEURONTIN     TK 1 T PO TID        * gabapentin 400 MG capsule    NEURONTIN    270 capsule    Take 1 capsule (400 mg) by mouth 3 times daily    Degenerative disc disease at L5-S1 level       ibuprofen 800 MG tablet    ADVIL/MOTRIN    270 tablet    TAKE 1 TABLET BY MOUTH THREE TIMES DAILY AS NEEDED    Essential hypertension       lisinopril 10 MG tablet    PRINIVIL/ZESTRIL    90 tablet    TAKE 1 TABLET BY MOUTH EVERY DAY    Essential hypertension       metFORMIN 500 MG tablet    GLUCOPHAGE    90 tablet    Take 1 tablet (500 mg) by mouth daily (with breakfast) Once daily with a meal    Controlled type 2 diabetes mellitus without complication, without long-term current use of insulin (H)       methocarbamol 500 MG tablet    ROBAXIN    5 tablet    Take 1 tablet (500 mg) by mouth nightly as needed for muscle spasms    TMJ (temporomandibular joint syndrome), Jaw pain       PARoxetine 10 MG tablet    PAXIL    90 tablet    TAKE 1 TABLET BY MOUTH EVERY MORNING    Anxiety state       predniSONE 20 MG tablet    DELTASONE    10 tablet    Take 2 tablets (40 mg) by mouth daily for 5 days    Jaw pain, TMJ (temporomandibular joint syndrome)       * Notice:  This  list has 4 medication(s) that are the same as other medications prescribed for you. Read the directions carefully, and ask your doctor or other care provider to review them with you.

## 2018-07-23 NOTE — PROGRESS NOTES
Patient Information     Patient Name  Alejandra Villegas MRN  [id number] Sex  Female   1965      Letter by Dipak Welsh MD at      Author:  Dipak Welsh MD Service:  (none) Author Type:  (none)    Filed:   Encounter Date:  2017 Status:  (Other)           Alejandra Villegas  [address]  [place] [address]          May 6, 2017    Dear Ms. Villegas:    Your labs are included below. Everything looks good with normal electrolytes, liver, and kidney tests. The cholesterol values look good.     The urine for the microalbumin test leaked out on the way to the lab, so unfortunately they could not run this test. It should be done next time you need lab this year, but is not urgent, so no need to worry at this time.    Please contact me if you have any questions.    Sincerely,      Dipak Welsh MD  Reviewed and electronically signed by provider.    Results for orders placed or performed in visit on 17      COMP METABOLIC PANEL      Result  Value Ref Range    SODIUM 140 133 - 143 mmol/L    POTASSIUM 3.9 3.5 - 5.1 mmol/L    CHLORIDE 104 98 - 107 mmol/L    CO2,TOTAL 26 21 - 31 mmol/L    ANION GAP 10 5 - 18                    GLUCOSE 99 70 - 105 mg/dL    CALCIUM 9.9 8.6 - 10.3 mg/dL    BUN 12 7 - 25 mg/dL    CREATININE 0.82 0.70 - 1.30 mg/dL    BUN/CREAT RATIO           15                    GFR if African American >60 >60 ml/min/1.73m2    GFR if not African American >60 >60 ml/min/1.73m2    ALBUMIN 4.1 3.5 - 5.7 g/dL    PROTEIN,TOTAL 6.9 6.4 - 8.9 g/dL    GLOBULIN                  2.8 2.0 - 3.7 g/dL    A/G RATIO 1.5 1.0 - 2.0                    BILIRUBIN,TOTAL 0.3 0.3 - 1.0 mg/dL    ALK PHOSPHATASE 82 34 - 104 IU/L    ALT (SGPT) 12 7 - 52 IU/L    AST (SGOT) 13 13 - 39 IU/L   LIPID PANEL      Result  Value Ref Range    CHOLESTEROL,TOTAL 139 <200 mg/dL    TRIGLYCERIDES 163 (H) <150 mg/dL    HDL CHOLESTEROL 36 23 - 92 mg/dL    NON-HDL CHOLESTEROL 103 <145 mg/dl    CHOL/HDL RATIO            3.86 <4.50                     LDL CHOLESTEROL 70 <100 mg/dL    PATIENT STATUS            NON-FASTING

## 2018-07-23 NOTE — PROGRESS NOTES
Nursing Notes:   Janette Aiken LPN  7/23/2018  1:57 PM  Signed  Pt present to clinic today for ear swelling that goes down to her jaw, she states she has a hard time eating because her jaw is so swollen. She has been taking ibuprofen at home for pain without relief.   Janette Aiken LPN on 7/23/2018 at 1:27 PM      SUBJECTIVE:   Alejandra Villegas is a 52 year old female who presents to clinic today for the following health issues:    Ear concerns:       Duration: 2 days    Description  ear pain right and jaw pain with eating.     Severity: moderate    Accompanying signs and symptoms: Mild face swelling, pain on jaw    History (predisposing factors):  none    Precipitating or alleviating factors: None    Therapies tried and outcome:  rest and fluids OTC NSAID        Problem list and histories reviewed & adjusted, as indicated.  Additional history: as documented    Current Outpatient Prescriptions   Medication Sig Dispense Refill     albuterol (PROAIR HFA/PROVENTIL HFA/VENTOLIN HFA) 108 (90 Base) MCG/ACT Inhaler Inhale 2 puffs into the lungs every 4 hours as needed 1 Inhaler 11     amitriptyline (ELAVIL) 10 MG tablet Start at 1 tab at bedtime for 3 days, then 2 tabs at bedtime for 3 days, then 3 tabs at bedtime. 90 tablet 11     atorvastatin (LIPITOR) 40 MG tablet Take 1 tablet (40 mg) by mouth At Bedtime 90 tablet 4     famotidine (PEPCID) 20 MG tablet Take 1 tablet (20 mg) by mouth 2 times daily 180 tablet 1     gabapentin (NEURONTIN) 400 MG capsule Take 1 capsule (400 mg) by mouth 3 times daily 270 capsule 4     ibuprofen (ADVIL/MOTRIN) 800 MG tablet TAKE 1 TABLET BY MOUTH THREE TIMES DAILY AS NEEDED 270 tablet 0     lisinopril (PRINIVIL/ZESTRIL) 10 MG tablet TAKE 1 TABLET BY MOUTH EVERY DAY 90 tablet 1     metFORMIN (GLUCOPHAGE) 500 MG tablet Take 1 tablet (500 mg) by mouth daily (with breakfast) Once daily with a meal 90 tablet 4     methocarbamol (ROBAXIN) 500 MG tablet Take 1 tablet (500 mg) by mouth  nightly as needed for muscle spasms 5 tablet 0     PARoxetine (PAXIL) 10 MG tablet TAKE 1 TABLET BY MOUTH EVERY MORNING 90 tablet 0     predniSONE (DELTASONE) 20 MG tablet Take 2 tablets (40 mg) by mouth daily for 5 days 10 tablet 0     atorvastatin (LIPITOR) 40 MG tablet Take 40 mg by mouth       gabapentin (NEURONTIN) 800 MG tablet TK 1 T PO TID  1     Allergies   Allergen Reactions     Adhesive Tape      Bee Pollen Swelling     Seasonal     Codeine      Other reaction(s): Headache  Tylenol #3     Folic Acid Itching     Pollen Extract      Seasonal     Amoxicillin Rash     Liquid Adhesive Rash     Nabumetone GI Disturbance     GI upset         ROS:  Notable findings in the HPI.       OBJECTIVE:     /72 (BP Location: Left arm, Patient Position: Sitting, Cuff Size: Adult Regular)  Pulse 62  Temp 98  F (36.7  C) (Tympanic)  Wt 169 lb 12.8 oz (77 kg)  Breastfeeding? No  BMI 28.04 kg/m2  Body mass index is 28.04 kg/(m^2).  GENERAL: healthy, alert and no distress  EYES: Eyes grossly normal to inspection  HENT: normal cephalic/atraumatic, right ear: normal: no effusions, no erythema, normal landmarks, left ear: normal: no effusions, no erythema, normal landmarks, nose and mouth without ulcers or lesions, oropharynx clear, oral mucous membranes moist, sinuses: not tender and RT TMJ very painful to palpate. Patient feels RT jaw is swollen, this is not noted on exam.   NECK: no adenopathy  RESP: With ease  SKIN: no suspicious lesions or rashes    Diagnostic Test Results:  none     ASSESSMENT/PLAN:     1. Jaw pain  - predniSONE (DELTASONE) 20 MG tablet; Take 2 tablets (40 mg) by mouth daily for 5 days  Dispense: 10 tablet; Refill: 0  - methocarbamol (ROBAXIN) 500 MG tablet; Take 1 tablet (500 mg) by mouth nightly as needed for muscle spasms  Dispense: 5 tablet; Refill: 0    2. TMJ (temporomandibular joint syndrome)  - predniSONE (DELTASONE) 20 MG tablet; Take 2 tablets (40 mg) by mouth daily for 5 days  Dispense:  10 tablet; Refill: 0  - methocarbamol (ROBAXIN) 500 MG tablet; Take 1 tablet (500 mg) by mouth nightly as needed for muscle spasms  Dispense: 5 tablet; Refill: 0      PLAN:    MS Injury/Pain  ice, heat, rest, Tylenol, Ibuprofen and Rx: prednisone. F/u if needed. See dentist PRN if not getting better.     Followup:    If not improving or if condition worsens, follow up with your Primary Care Provider    Disclaimer:  This note consists of words and symbols derived from keyboarding, dictation, or using voice recognition software. As a result, there may be errors in the script that have gone undetected. Please consider this when interpreting information found in this note.      Julia Cullen NP, 7/23/2018 1:36 PM

## 2018-07-23 NOTE — NURSING NOTE
Pt present to clinic today for ear swelling that goes down to her jaw, she states she has a hard time eating because her jaw is so swollen. She has been taking ibuprofen at home for pain without relief.   Janette Aiken LPN on 7/23/2018 at 1:27 PM

## 2018-07-23 NOTE — PROGRESS NOTES
Patient Information     Patient Name  Alejandra Villegas MRN  [id number] Sex  Female   1965      Letter by Dipak Welsh MD at      Author:  Dipak Welsh MD Service:  (none) Author Type:  (none)    Filed:   Encounter Date:  2017 Status:  (Other)           Alejandra Villegas  [address]  [facility] [address]          January 3, 2017    Dear Ms. Villegas:    This A1c still looks good. The last A1c was 5.7%. Keep with one metformin daily. Plan to recheck the A1c in 6 months.     Please contact me if you have any questions.    Sincerely,      Dipak Welsh MD  Reviewed and electronically signed by provider.     Results for orders placed or performed in visit on 17      HEMOGLOBIN A1C MONITORING (POCT)      Result  Value Ref Range    HEMOGLOBIN A1C MONITORING (POCT) 5.9 4.0 - 6.2 %    ESTIMATED AVERAGE GLUCOSE  123 mg/dL

## 2018-07-23 NOTE — PROGRESS NOTES
Patient Information     Patient Name  Alejandra Villegas MRN  [id number] Sex  Female   1965      Letter by Abner Anton MD at      Author:  Abner Anton MD Service:  (none) Author Type:  (none)    Filed:   Date of Service:   Status:  (Other)       [facility]  [address]  [place] [address]  [phone]         Alejandra Villegas   [address]  [place] [address]      2017  Date of Breast Imagin2017  3:12 PM    Dear Ms. Villegas:  We are pleased to inform you that the result of your recent breast imaging examination is normal/benign (not cancer). A report of your results was sent to your health care provider(s).    Your mammogram shows that your breast tissue is dense.  Dense breast tissue is relatively common and is found in more than 50 percent of women. Dense breast tissue may be associated with a slight increased risk of breast cancer and may make cancer more difficult to detect by mammogram. However, the actual risk of breast cancer for women with dense breast tissue is still low. This information is given to you to raise your own awareness and help you talk with your primary care provider. Together, you can decide which screening options are right for you.     Your images will become part of your medical file here at [facility] and will be available for your continuing care. You are responsible for informing any new health care provider or breast imaging facility of the date and location of this examination.    Mammography remains the gold standard and is the most accurate method for early detection. Mammograms are the only medical imaging test shown to reduce breast cancer deaths. Not all cancers are found through mammography. If you notice any new changes in your breast(s) please inform your healthcare provider.     Thank you for choosing [facility] to participate in your healthcare needs.     [facility]  Clinic [facility] Recommendations for Early Breast Cancer Detection   in Women without Symptoms  When to start having mammograms to screen for breast cancer, and how often to have them, is a personal decision. It should be based on your preferences, your values and your risk for developing breast cancer. [facility] recommends that you and your health care provider together determine when mammograms are right for you.    [facility] recommends the following guidelines for women who have an average risk for breast cancer, based on American Cancer Society guidelines:    Age 40 to 44: Mammograms are optional.     Age 45 to 54: Have a mammogram every year.           Age 55 and older: Have a mammogram every year, or transition to having one every 2 years. Continue to have mammograms as long as your health is good.  If you have a higher than average risk for breast cancer, your health care provider may recommend a different schedule.

## 2018-07-24 ASSESSMENT — PATIENT HEALTH QUESTIONNAIRE - PHQ9: SUM OF ALL RESPONSES TO PHQ QUESTIONS 1-9: 4

## 2018-07-24 NOTE — PROGRESS NOTES
Patient Information     Patient Name  Alejandra Villegas MRN  [id number] Sex  Female   1965      Letter by Consuelo Mitchell MD at      Author:  Consuelo Mitchell MD Service:  (none) Author Type:  (none)    Filed:   Date of Service:   Status:  (Other)       [facility]  [address]  [place] [address]  [phone]         Alejandra Villegas   [address]  [place] [address]      2017  Date of Breast Imagin2017  9:50 AM    Dear Ms. Villegas:    Your recent breast imaging examination showed a finding that requires additional imaging studies for a complete evaluation. Most findings are benign (not cancer). Please call [phone] to schedule an appointment for these tests if you have not already done so.    A report of your results was sent to your health care provider(s).  Your mammogram shows that your breast tissue is dense.  Dense breast tissue is relatively common and is found in more than 40 percent of women.  However, dense breast tissue may make it harder to identify cancer through a mammogram.  It may also be related to an increased risk of breast cancer.    The results of your mammogram are given to you and your primary care provider.  This information will raise your own awareness and help you talk with your primary care provider about your screening options.  Together you can decide which options are right for you based on your mammogram results, risk factors or physical exam.      Your images will become part of your medical file here at [facility] and will be available for your continuing care. You are responsible for informing any new health care provider or breast imaging facility of the date and location of this examination.    Although mammography is the most accurate method for early detection, not all cancers are found through mammography. If you notice any new changes in your breast(s) please inform your health care provider without  delay.    Thank you for choosing [facility] to participate in your healthcare needs.       [facility] Recommendations for Early Breast Cancer Detection   in Women without Symptoms  When to start having mammograms to screen for breast cancer, and how often to have them, is a personal decision. It should be based on your preferences, your values and your risk for developing breast cancer. [facility] recommends that you and your health care provider together determine when mammograms are right for you.    [facility] recommends the following guidelines for women who have an average risk for breast cancer, based on American Cancer Society guidelines:    Age 40 to 44: Mammograms are optional.     Age 45 to 54: Have a mammogram every year.    Age 55 and older: Have a mammogram every year, or transition to having one every 2 years. Continue to have mammograms as long as your health is good.    If you have a higher than average risk for breast cancer, your health care provider may recommend a different schedule.

## 2018-07-24 NOTE — PROGRESS NOTES
Patient Information     Patient Name  Alejandra Villegas MRN  [id number] Sex  Female   1965      Letter by Yuliana Fagan MD at      Author:  Yuliana Fagan MD Service:  (none) Author Type:  (none)    Filed:   Encounter Date:  3/11/2017 Status:  (Other)           Alejandra Villegas  [address]  [place] [facility] [address]          2017    Dear Ms. Villegas:    A 90 day refill of Atorvastatin (LIPITOR) 40 mg tablet has been called into your pharmacy.    Additional refills require an annual office visit with Yuliana Fagan MD.  Your last office visit was on 2015.      Please call the clinic at [phone] to schedule your appointment.    If you should require additional refills before your scheduled appointment, please contact your pharmacy and we will refill your medication until that date.      Thank you,    The Refill Nurse  [facility]

## 2018-07-24 NOTE — PROGRESS NOTES
Patient Information     Patient Name  Alejandra Villegas MRN  [id number] Sex  Female   1965      Letter by Yuliana Fagan MD at      Author:  Yuliana Fagan MD Service:  (none) Author Type:  (none)    Filed:   Encounter Date:  2017 Status:  (Other)           Alejandra Villegas  [address]  [place] [facility] [address]          2017    Dear Ms. Villegas:     A 90 dayrefill of MetFORMIN (GLUCOPHAGE) 500 mg tablet has been called into your pharmacy.    Additional refills require an annual medication review office visit with Yuliana Fagan MD.  Your last office visit with Yuliana Fagan MD  was on 2015    Please call the clinic at [phone] to schedule your appointment.    If you should require additional refills before your scheduled appointment, please contact your pharmacy and we will refill your medication until that date.      Thank you,    The Refill Nurse  [facility]

## 2018-07-24 NOTE — PROGRESS NOTES
Patient Information     Patient Name  Alejandra Villegas MRN  [id number] Sex  Female   1965      Letter by Yuliana Fagan MD at      Author:  Yuliana Fagan MD Service:  (none) Author Type:  (none)    Filed:   Encounter Date:  2017 Status:  (Other)           Alejandra Villegas  [address]  [place] [address]          2017    Dear Ms. Villegas:    A 90 day refill of Ibuprofen (ADVIL; MOTRIN) 800 mg tablet has been called into your pharmacy.    Additional refills require an annual medication review office visit with Yuliana Fagan MD.   Please call the clinic at [phone] to schedule your appointment.    If you should require additional refills before your scheduled appointment, please contact your pharmacy and we will refill your medication until that date.      Thank you,    The Refill Nurse  [facility]

## 2018-07-26 DIAGNOSIS — F41.1 ANXIETY STATE: ICD-10-CM

## 2018-07-30 DIAGNOSIS — E78.5 HYPERLIPIDEMIA, UNSPECIFIED HYPERLIPIDEMIA TYPE: ICD-10-CM

## 2018-07-30 RX ORDER — PAROXETINE 10 MG/1
TABLET, FILM COATED ORAL
Qty: 90 TABLET | Refills: 2 | Status: SHIPPED | OUTPATIENT
Start: 2018-07-30 | End: 2019-04-17

## 2018-07-30 NOTE — LETTER
August 1, 2018      Alejandra Villegas     [facility] [address]        Dear Alejandra,       This is to remind you that you are due for your 1 month follow up appointment with Dipak Welsh. Your last visit was on 5/11/18. Additional refills of your medication require you to complete this visit.    Please call [phone] to schedule your appointment.    Thank you for choosing [facility] for your health care needs.    Sincerely,      Refill RN  [facility]

## 2018-07-30 NOTE — TELEPHONE ENCOUNTER
"Last OV with PCP on 5/11/18 \"Takes Paxil 10 mg daily for anxiety. Working well\"    Prescription approved per [facility] Refill Protocol.  Consuelo Green RN............................ 7/30/2018 12:07 PM    "

## 2018-08-01 RX ORDER — ATORVASTATIN CALCIUM 40 MG/1
TABLET, FILM COATED ORAL
Qty: 135 TABLET | Refills: 0 | OUTPATIENT
Start: 2018-08-01

## 2018-08-01 NOTE — TELEPHONE ENCOUNTER
Rx request from pharmacy is for atorvastatin 60 mg daily. Chart review shows active Rx on file as noted below as prescribed on 5/11/18:    Medication Detail      Disp Refills Start End SYMONE   atorvastatin (LIPITOR) 40 MG tablet 90 tablet 4 5/11/2018  No   Sig - Route: Take 1 tablet (40 mg) by mouth At Bedtime - Oral   Class: E-Prescribe   Order: [id number]   E-Prescribing Status: Receipt confirmed by pharmacy (5/11/2018  2:21 PM CDT)   Printout Tracking   External Result Report   Pharmacy   [facility] [id number] - [place], [place] - [address]     Writer also notes that patient was seen by PCP on 5/11/18 with plan as noted below:    Discussed that previously LDL goal was at least below 100 with diabetes, but now idea is that she should take a moderate or high intensity statin. Can reduce atorvastatin to 40 mg daily.    Patient should be taking 40 mg daily, and not 60 mg daily as requested by the pharmacy. Writer also notes that office visit notes on 5/11/18 indicate that patient was to have followed up in 1 months time. No appointment is noted.    Writer will refuse Rx request from pharmacy at this time and make note of Rx as noted above in Rx refusal. Writer will also send patient a reminder letter about need for follow up.    Unable to complete prescription refill per RN Medication Refill Policy. Armando Gunn 8/1/2018 2:23 PM

## 2018-08-07 ENCOUNTER — ALLIED HEALTH/NURSE VISIT (OUTPATIENT)
Dept: UROLOGY | Facility: OTHER | Age: 53
End: 2018-08-07
Attending: UROLOGY
Payer: MEDICARE

## 2018-08-07 VITALS — RESPIRATION RATE: 14 BRPM | BODY MASS INDEX: 28.04 KG/M2 | HEART RATE: 52 BPM | WEIGHT: 169.8 LBS

## 2018-08-07 DIAGNOSIS — N39.41 URGE INCONTINENCE: Primary | ICD-10-CM

## 2018-08-07 RX ORDER — ATORVASTATIN CALCIUM 40 MG/1
TABLET, FILM COATED ORAL
Qty: 135 TABLET | Refills: 0 | OUTPATIENT
Start: 2018-08-07

## 2018-08-07 ASSESSMENT — PAIN SCALES - GENERAL: PAINLEVEL: NO PAIN (0)

## 2018-08-07 NOTE — MR AVS SNAPSHOT
After Visit Summary   8/7/2018    Alejandra Villegas    MRN: [id number]           Patient Information     Date Of Birth          1965        Visit Information        Provider Department      8/7/2018 9:15 AM  UROLOGY NURSE [facility]        Today's Diagnoses     Urge incontinence    -  1       Follow-ups after your visit        Who to contact     If you have questions or need follow up information about today's clinic visit or your schedule please contact [facility] directly at [phone].  Normal or non-critical lab and imaging results will be communicated to you by MyChart, letter or phone within 4 business days after the clinic has received the results. If you do not hear from us within 7 days, please contact the clinic through MyChart or phone. If you have a critical or abnormal lab result, we will notify you by phone as soon as possible.  Submit refill requests through [employer] or call your pharmacy and they will forward the refill request to us. Please allow 3 business days for your refill to be completed.          Additional Information About Your Visit        Care EveryWhere ID     This is your Care EveryWhere ID. This could be used by other organizations to access your [facility] medical records  [id number]        Your Vitals Were     Pulse Respirations BMI (Body Mass Index)             52 14 28.04 kg/m2          Blood Pressure from Last 3 Encounters:   07/23/18 106/72   05/11/18 112/80   11/14/17 92/60    Weight from Last 3 Encounters:   08/07/18 77 kg (169 lb 12.8 oz)   07/23/18 77 kg (169 lb 12.8 oz)   05/11/18 75.3 kg (166 lb)              Today, you had the following     No orders found for display       Primary Care Provider Office Phone # Fax #    Dipak Welsh [phone] [address]  [facility] [address]        Equal Access to Services     MAXIMO ORTIZ AH: addy Dang  stefanie josé manueledelmiralc jesuscharley juares ah. So [facility] [phone].    ATENCIÓN: Si jhonny rouse, tiene a villagomez disposición servicios gratuitos de asistencia lingüística. Lexus al [phone].    We comply with applicable federal civil rights laws and [place] laws. We do not discriminate on the basis of race, color, national origin, age, disability, sex, sexual orientation, or gender identity.            Thank you!     Thank you for choosing [facility]  for your care. Our goal is always to provide you with excellent care. Hearing back from our patients is one way we can continue to improve our services. Please take a few minutes to complete the written survey that you may receive in the mail after your visit with us. Thank you!             Your Updated Medication List - Protect others around you: Learn how to safely use, store and throw away your medicines at www.disposemymeds.org.          This list is accurate as of 8/7/18 11:00 AM.  Always use your most recent med list.                   Brand Name Dispense Instructions for use Diagnosis    albuterol 108 (90 Base) MCG/ACT Inhaler    PROAIR HFA/PROVENTIL HFA/VENTOLIN HFA    1 Inhaler    Inhale 2 puffs into the lungs every 4 hours as needed    Mild persistent asthma without complication       amitriptyline 10 MG tablet    ELAVIL    90 tablet    Start at 1 tab at bedtime for 3 days, then 2 tabs at bedtime for 3 days, then 3 tabs at bedtime.    Degenerative disc disease at L5-S1 level       * atorvastatin 40 MG tablet    LIPITOR     Take 40 mg by mouth        * atorvastatin 40 MG tablet    LIPITOR    90 tablet    Take 1 tablet (40 mg) by mouth At Bedtime    Dyslipidemia       famotidine 20 MG tablet    PEPCID    180 tablet    Take 1 tablet (20 mg) by mouth 2 times daily    Gastroesophageal reflux disease, esophagitis presence not specified       * gabapentin 800 MG tablet    NEURONTIN     TK 1 T PO TID        *  gabapentin 400 MG capsule    NEURONTIN    270 capsule    Take 1 capsule (400 mg) by mouth 3 times daily    Degenerative disc disease at L5-S1 level       ibuprofen 800 MG tablet    ADVIL/MOTRIN    270 tablet    TAKE 1 TABLET BY MOUTH THREE TIMES DAILY AS NEEDED    Essential hypertension       lisinopril 10 MG tablet    PRINIVIL/ZESTRIL    90 tablet    TAKE 1 TABLET BY MOUTH EVERY DAY    Essential hypertension       metFORMIN 500 MG tablet    GLUCOPHAGE    90 tablet    Take 1 tablet (500 mg) by mouth daily (with breakfast) Once daily with a meal    Controlled type 2 diabetes mellitus without complication, without long-term current use of insulin (H)       methocarbamol 500 MG tablet    ROBAXIN    5 tablet    Take 1 tablet (500 mg) by mouth nightly as needed for muscle spasms    TMJ (temporomandibular joint syndrome), Jaw pain       PARoxetine 10 MG tablet    PAXIL    90 tablet    TAKE 1 TABLET BY MOUTH EVERY MORNING    Anxiety state       * Notice:  This list has 4 medication(s) that are the same as other medications prescribed for you. Read the directions carefully, and ask your doctor or other care provider to review them with you.

## 2018-08-07 NOTE — NURSING NOTE
Deonte from  [employer] met with patient.  Battery life has about six to nine months left.  Program was switched from 3 to 1.  Patient is happy with program 1.  Patient is aware that surgery will need to be scheduled, she will look at her calendar and call back to to set a date.  Milla Davila LPN.......8/7/2018 10:54 AM

## 2018-08-13 ENCOUNTER — TELEPHONE (OUTPATIENT)
Dept: UROLOGY | Facility: OTHER | Age: 53
End: 2018-08-13

## 2018-08-14 NOTE — TELEPHONE ENCOUNTER
Urology  called patient and states that she already spoke to Alejandra.  Madison Lima RN......August 14, 2018...9:08 AM

## 2018-08-15 ENCOUNTER — OFFICE VISIT (OUTPATIENT)
Dept: FAMILY MEDICINE | Facility: OTHER | Age: 53
End: 2018-08-15
Attending: FAMILY MEDICINE
Payer: MEDICARE

## 2018-08-15 VITALS
DIASTOLIC BLOOD PRESSURE: 70 MMHG | WEIGHT: 171.2 LBS | SYSTOLIC BLOOD PRESSURE: 112 MMHG | BODY MASS INDEX: 28.27 KG/M2 | HEART RATE: 64 BPM

## 2018-08-15 DIAGNOSIS — S39.012A STRAIN OF LUMBAR REGION, INITIAL ENCOUNTER: ICD-10-CM

## 2018-08-15 DIAGNOSIS — M26.601 DISORDER OF RIGHT TEMPOROMANDIBULAR JOINT: Primary | ICD-10-CM

## 2018-08-15 DIAGNOSIS — E11.9 CONTROLLED TYPE 2 DIABETES MELLITUS WITHOUT COMPLICATION, WITHOUT LONG-TERM CURRENT USE OF INSULIN (H): ICD-10-CM

## 2018-08-15 DIAGNOSIS — I10 ESSENTIAL HYPERTENSION: ICD-10-CM

## 2018-08-15 PROCEDURE — 99214 OFFICE O/P EST MOD 30 MIN: CPT | Performed by: FAMILY MEDICINE

## 2018-08-15 PROCEDURE — G0463 HOSPITAL OUTPT CLINIC VISIT: HCPCS

## 2018-08-15 RX ORDER — HYDROCODONE BITARTRATE AND ACETAMINOPHEN 5; 325 MG/1; MG/1
1 TABLET ORAL EVERY 6 HOURS PRN
Qty: 5 TABLET | Refills: 0 | Status: SHIPPED | OUTPATIENT
Start: 2018-08-15 | End: 2018-08-18

## 2018-08-15 RX ORDER — LISINOPRIL 10 MG/1
10 TABLET ORAL DAILY
Qty: 90 TABLET | Refills: 1 | Status: SHIPPED | OUTPATIENT
Start: 2018-08-15 | End: 2019-03-05

## 2018-08-15 ASSESSMENT — ANXIETY QUESTIONNAIRES
IF YOU CHECKED OFF ANY PROBLEMS ON THIS QUESTIONNAIRE, HOW DIFFICULT HAVE THESE PROBLEMS MADE IT FOR YOU TO DO YOUR WORK, TAKE CARE OF THINGS AT HOME, OR GET ALONG WITH OTHER PEOPLE: SOMEWHAT DIFFICULT
6. BECOMING EASILY ANNOYED OR IRRITABLE: NEARLY EVERY DAY
7. FEELING AFRAID AS IF SOMETHING AWFUL MIGHT HAPPEN: NOT AT ALL
3. WORRYING TOO MUCH ABOUT DIFFERENT THINGS: SEVERAL DAYS
5. BEING SO RESTLESS THAT IT IS HARD TO SIT STILL: NOT AT ALL
2. NOT BEING ABLE TO STOP OR CONTROL WORRYING: MORE THAN HALF THE DAYS
1. FEELING NERVOUS, ANXIOUS, OR ON EDGE: MORE THAN HALF THE DAYS
GAD7 TOTAL SCORE: 8

## 2018-08-15 ASSESSMENT — PATIENT HEALTH QUESTIONNAIRE - PHQ9: 5. POOR APPETITE OR OVEREATING: NOT AT ALL

## 2018-08-15 ASSESSMENT — PAIN SCALES - GENERAL: PAINLEVEL: SEVERE PAIN (7)

## 2018-08-15 NOTE — PROGRESS NOTES
"Nursing Notes:   Madison Rodríguez LPN  8/15/2018  3:51 PM  Signed  Chief Complaint   Patient presents with     Medication management       Initial /70 (BP Location: Right arm, Patient Position: Chair, Cuff Size: Adult Regular)  Pulse 64  Wt 171 lb 3.2 oz (77.7 kg)  BMI 28.27 kg/m2 Estimated body mass index is 28.27 kg/(m^2) as calculated from the following:    Height as of 10/16/17: 5' 5.25\" (1.657 m).    Weight as of this encounter: 171 lb 3.2 oz (77.7 kg).  Medication Reconciliation: complete    Madison Rodríguez LPN    SUBJECTIVE:  52 year old female presents to follow up on hyperlipidemia, hypertension, diabetes and for concerns about R jaw pain and low back strain.     She was contacted to make an appointment for refill on Lipitor. Tolerating current dose. Last lipids were over a year ago.    BP controlled and tolerating medication    Diabetes under good control on metformin, last A1c 5.8% in May.     She was seen in [facility] a few weeks ago and given prednisone and methocarbamol to help with TMJ pain. It helped some. Continues to bother her.    Injured back 5 days ago. Usually it gets better in a few days. She hopes for some temporary hydrocodone. Used to be on it chronically, but violated controlled substance agreement.  Last appointment in May recommended use of amitriptyline. It helps her sleep at 10 mg. Has not helped pain, but she has not tried to increase dose either to 20 mg.      Started amitriptyline   REVIEW OF SYSTEMS:    Constitutional: negative  Respiratory: negative  Cardiovascular: negative    Current Outpatient Prescriptions   Medication Sig Dispense Refill     albuterol (PROAIR HFA/PROVENTIL HFA/VENTOLIN HFA) 108 (90 Base) MCG/ACT Inhaler Inhale 2 puffs into the lungs every 4 hours as needed 1 Inhaler 11     amitriptyline (ELAVIL) 10 MG tablet Start at 1 tab at bedtime for 3 days, then 2 tabs at bedtime for 3 days, then 3 tabs at bedtime. 90 tablet 11     atorvastatin (LIPITOR) 40 " MG tablet Take 40 mg by mouth       atorvastatin (LIPITOR) 40 MG tablet Take 1 tablet (40 mg) by mouth At Bedtime 90 tablet 4     famotidine (PEPCID) 20 MG tablet Take 1 tablet (20 mg) by mouth 2 times daily 180 tablet 1     gabapentin (NEURONTIN) 400 MG capsule Take 1 capsule (400 mg) by mouth 3 times daily 270 capsule 4     gabapentin (NEURONTIN) 800 MG tablet TK 1 T PO TID  1     ibuprofen (ADVIL/MOTRIN) 800 MG tablet TAKE 1 TABLET BY MOUTH THREE TIMES DAILY AS NEEDED 270 tablet 0     lisinopril (PRINIVIL/ZESTRIL) 10 MG tablet TAKE 1 TABLET BY MOUTH EVERY DAY 90 tablet 1     metFORMIN (GLUCOPHAGE) 500 MG tablet Take 1 tablet (500 mg) by mouth daily (with breakfast) Once daily with a meal 90 tablet 4     methocarbamol (ROBAXIN) 500 MG tablet Take 1 tablet (500 mg) by mouth nightly as needed for muscle spasms 5 tablet 0     PARoxetine (PAXIL) 10 MG tablet TAKE 1 TABLET BY MOUTH EVERY MORNING 90 tablet 2     Allergies   Allergen Reactions     Adhesive Tape      Bee Pollen Swelling     Seasonal     Codeine      Other reaction(s): Headache  Tylenol #3     Folic Acid Itching     Pollen Extract      Seasonal     Amoxicillin Rash     Liquid Adhesive Rash     Nabumetone GI Disturbance     GI upset       OBJECTIVE:  /70 (BP Location: Right arm, Patient Position: Chair, Cuff Size: Adult Regular)  Pulse 64  Wt 171 lb 3.2 oz (77.7 kg)  BMI 28.27 kg/m2    EXAM:  General Appearance: Alert. No acute distress  Head: Tender over R TMJ and R muscle of mastication  Chest/Respiratory Exam: Clear to auscultation bilaterally  Cardiovascular Exam: Regular rate and rhythm. S1, S2, no murmur, gallop, or rubs.  Musculoskeletal Exam: Lumbar Spine: tenderness over lumbar paraspinous muscles  Neurologic Exam: reflexes 2+, strength symmetric lower extremities; SLR negative bilaterally  Extremities: No lower extremity edema.  Psychiatric: Normal affect and mentation      ASSESSMENT/PLAN:    ICD-10-CM    1. Disorder of right  temporomandibular joint M26.601 PHYSICAL THERAPY REFERRAL   2. Essential hypertension I10 lisinopril (PRINIVIL/ZESTRIL) 10 MG tablet   3. Strain of lumbar region, initial encounter S39.012A HYDROcodone-acetaminophen (NORCO) 5-325 MG per tablet   4. Controlled type 2 diabetes mellitus without complication, without long-term current use of insulin (H) E11.9 Hemoglobin A1c       Continued TMJ pain. Recommend PT to help resolve the pain, referral placed.    Blood pressure controlled. Labs WNL in May, continue same. Refilled lisinopril    Lumbar strain on chronic LBP. She would like some hydrocodone to help for a few days until pain calms down. No recent opioids, last had T#3 in March from Dr Ramirez.  Given #5 hydrocodone to use in conjunction with ibuprofen. No refill planned.   Increase amitriptyline to 20 mg at bedtime if tolerated.    F/U in Nov to Dec on diabetes, A1c only at that time    Dipak Welsh MD    This document was prepared using a combination of typing and voice generated software.  While every attempt was made for accuracy, spelling and grammatical errors may exist.

## 2018-08-15 NOTE — MR AVS SNAPSHOT
After Visit Summary   8/15/2018    Alejandra Villegas    MRN: [id number]           Patient Information     Date Of Birth          1965        Visit Information        Provider Department      8/15/2018 3:30 PM Dipak Welsh MD [facility]        Today's Diagnoses     Disorder of right temporomandibular joint    -  1    Essential hypertension        Strain of lumbar region, initial encounter        Controlled type 2 diabetes mellitus without complication, without long-term current use of insulin (H)          Care Instructions    Refilled a couple medications, but everything should be good until next May  Limited hydrocodone for back strain  Try amitriptyline at 20 mg (2 pills) per night to see if you tolerate  Follow up by May, consider follow up in November or December on diabetes          Follow-ups after your visit        Additional Services     PHYSICAL THERAPY REFERRAL       *This therapy referral will be filtered to a centralized scheduling office at [facility] and the patient will receive a call to schedule an appointment at a [facility] [place] location most convenient for them. *     [facility] provides Physical Therapy evaluation and treatment and many specialty services across the [place] system.  If requesting a specialty program, please choose from the list below.    If you have not heard from the scheduling office within 2 business days, please call [phone] for all locations, with the exception of [place], please call [phone] and [facility], please call [phone]  Treatment: Evaluation & Treatment  Special Instructions/Modalities:   Special Programs: TMJ    Please be aware that coverage of these services is subject to the terms and limitations of your health insurance plan.  Call member services at your health plan with any benefit or coverage questions.      **Note to Provider:  If you are referring outside of  "[place] for the therapy appointment, please list the name of the location in the \"special instructions\" above, print the referral and give to the patient to schedule the appointment.                  Future tests that were ordered for you today     Open Standing Orders        Priority Remaining Interval Expires Ordered    Hemoglobin A1c Routine 4/4 Every 3 Months 8/15/2019 8/15/2018            Who to contact     If you have questions or need follow up information about today's clinic visit or your schedule please contact [facility] directly at [phone].  Normal or non-critical lab and imaging results will be communicated to you by MyChart, letter or phone within 4 business days after the clinic has received the results. If you do not hear from us within 7 days, please contact the clinic through MyChart or phone. If you have a critical or abnormal lab result, we will notify you by phone as soon as possible.  Submit refill requests through Datezr or call your pharmacy and they will forward the refill request to us. Please allow 3 business days for your refill to be completed.          Additional Information About Your Visit        Care EveryWhere ID     This is your Care EveryWhere ID. This could be used by other organizations to access your [facility] medical records  [id number]        Your Vitals Were     Pulse BMI (Body Mass Index)                64 28.27 kg/m2           Blood Pressure from Last 3 Encounters:   08/15/18 112/70   07/23/18 106/72   05/11/18 112/80    Weight from Last 3 Encounters:   08/15/18 171 lb 3.2 oz (77.7 kg)   08/07/18 169 lb 12.8 oz (77 kg)   07/23/18 169 lb 12.8 oz (77 kg)              We Performed the Following     PHYSICAL THERAPY REFERRAL          Today's Medication Changes          These changes are accurate as of 8/15/18  4:07 PM.  If you have any questions, ask your nurse or doctor.               Start taking these medicines.        Dose/Directions    " HYDROcodone-acetaminophen 5-325 MG per tablet   Commonly known as:  NORCO   Used for:  Strain of lumbar region, initial encounter   Started by:  Dipak Welsh MD        Dose:  1 tablet   Take 1 tablet by mouth every 6 hours as needed for moderate to severe pain   Quantity:  5 tablet   Refills:  0         These medicines have changed or have updated prescriptions.        Dose/Directions    gabapentin 400 MG capsule   Commonly known as:  NEURONTIN   This may have changed:  Another medication with the same name was removed. Continue taking this medication, and follow the directions you see here.   Used for:  Degenerative disc disease at L5-S1 level   Changed by:  Dipak Welsh MD        Dose:  400 mg   Take 1 capsule (400 mg) by mouth 3 times daily   Quantity:  270 capsule   Refills:  4       lisinopril 10 MG tablet   Commonly known as:  PRINIVIL/ZESTRIL   This may have changed:  See the new instructions.   Used for:  Essential hypertension   Changed by:  Dipak Welsh MD        Dose:  10 mg   Take 1 tablet (10 mg) by mouth daily   Quantity:  90 tablet   Refills:  1         Stop taking these medicines if you haven't already. Please contact your care team if you have questions.     methocarbamol 500 MG tablet   Commonly known as:  ROBAXIN   Stopped by:  Dipak Welsh MD                Where to get your medicines      These medications were sent to [facility] [address] [place], [place] - [address] [facility] [facility] [address]     Phone:  [phone]     lisinopril 10 MG tablet         Some of these will need a paper prescription and others can be bought over the counter.  Ask your nurse if you have questions.     Bring a paper prescription for each of these medications     HYDROcodone-acetaminophen 5-325 MG per tablet               Information about OPIOIDS     PRESCRIPTION OPIOIDS: WHAT YOU NEED TO KNOW   We gave you an opioid (narcotic) pain  medicine. It is important to manage your pain, but opioids are not always the best choice. You should first try all the other options your care team gave you. Take this medicine for as short a time (and as few doses) as possible.    Some activities can increase your pain, such as bandage changes or therapy sessions. It may help to take your pain medicine 30 to 60 minutes before these activities. Reduce your stress by getting enough sleep, working on hobbies you enjoy and practicing relaxation or meditation. Talk to your care team about ways to manage your pain beyond prescription opioids.    These medicines have risks:    DO NOT drive when on new or higher doses of pain medicine. These medicines can affect your alertness and reaction times, and you could be arrested for driving under the influence (DUI). If you need to use opioids long-term, talk to your care team about driving.    DO NOT operate heavy machinery    DO NOT do any other dangerous activities while taking these medicines.    DO NOT drink any alcohol while taking these medicines.     If the opioid prescribed includes acetaminophen, DO NOT take with any other medicines that contain acetaminophen. Read all labels carefully. Look for the word  acetaminophen  or  Tylenol.  Ask your pharmacist if you have questions or are unsure.    You can get addicted to pain medicines, especially if you have a history of addiction (chemical, alcohol or substance dependence). Talk to your care team about ways to reduce this risk.    All opioids tend to cause constipation. Drink plenty of water and eat foods that have a lot of fiber, such as fruits, vegetables, prune juice, apple juice and high-fiber cereal. Take a laxative (Miralax, milk of magnesia, Colace, Senna) if you don t move your bowels at least every other day. Other side effects include upset stomach, sleepiness, dizziness, throwing up, tolerance (needing more of the medicine to have the same effect), physical  dependence and slowed breathing.    Store your pills in a secure place, locked if possible. We will not replace any lost or stolen medicine. If you don t finish your medicine, please throw away (dispose) as directed by your pharmacist. The [place] Pollution Control Agency has more information about safe disposal: https://www.pca.[facility].[place]/living-green/managing-unwanted-medications         Primary Care Provider Office Phone # Fax #    Dipak Welsh [phone] [address] [facility] [address]        Equal Access to Services     [facility]: Hadii aad ku hadasho Soomaali, waaxda luqadaha, qaybta kaalmada marcelina, charley dozier . So [facility] [phone].    ATENCIÓN: Si habla español, tiene a villagomez disposición servicios gratuitos de asistencia lingüística. Llame al [phone].    We comply with applicable federal civil rights laws and [place] laws. We do not discriminate on the basis of race, color, national origin, age, disability, sex, sexual orientation, or gender identity.            Thank you!     Thank you for choosing [facility]  for your care. Our goal is always to provide you with excellent care. Hearing back from our patients is one way we can continue to improve our services. Please take a few minutes to complete the written survey that you may receive in the mail after your visit with us. Thank you!             Your Updated Medication List - Protect others around you: Learn how to safely use, store and throw away your medicines at www.disposemymeds.org.          This list is accurate as of 8/15/18  4:07 PM.  Always use your most recent med list.                   Brand Name Dispense Instructions for use Diagnosis    albuterol 108 (90 Base) MCG/ACT inhaler    PROAIR HFA/PROVENTIL HFA/VENTOLIN HFA    1 Inhaler    Inhale 2 puffs into the lungs every 4 hours as needed    Mild persistent asthma without complication        amitriptyline 10 MG tablet    ELAVIL    90 tablet    Start at 1 tab at bedtime for 3 days, then 2 tabs at bedtime for 3 days, then 3 tabs at bedtime.    Degenerative disc disease at L5-S1 level       * atorvastatin 40 MG tablet    LIPITOR     Take 40 mg by mouth        * atorvastatin 40 MG tablet    LIPITOR    90 tablet    Take 1 tablet (40 mg) by mouth At Bedtime    Dyslipidemia       famotidine 20 MG tablet    PEPCID    180 tablet    Take 1 tablet (20 mg) by mouth 2 times daily    Gastroesophageal reflux disease, esophagitis presence not specified       gabapentin 400 MG capsule    NEURONTIN    270 capsule    Take 1 capsule (400 mg) by mouth 3 times daily    Degenerative disc disease at L5-S1 level       HYDROcodone-acetaminophen 5-325 MG per tablet    NORCO    5 tablet    Take 1 tablet by mouth every 6 hours as needed for moderate to severe pain    Strain of lumbar region, initial encounter       ibuprofen 800 MG tablet    ADVIL/MOTRIN    270 tablet    TAKE 1 TABLET BY MOUTH THREE TIMES DAILY AS NEEDED    Essential hypertension       lisinopril 10 MG tablet    PRINIVIL/ZESTRIL    90 tablet    Take 1 tablet (10 mg) by mouth daily    Essential hypertension       metFORMIN 500 MG tablet    GLUCOPHAGE    90 tablet    Take 1 tablet (500 mg) by mouth daily (with breakfast) Once daily with a meal    Controlled type 2 diabetes mellitus without complication, without long-term current use of insulin (H)       PARoxetine 10 MG tablet    PAXIL    90 tablet    TAKE 1 TABLET BY MOUTH EVERY MORNING    Anxiety state       * Notice:  This list has 2 medication(s) that are the same as other medications prescribed for you. Read the directions carefully, and ask your doctor or other care provider to review them with you.

## 2018-08-15 NOTE — PATIENT INSTRUCTIONS
Refilled a couple medications, but everything should be good until next May  Limited hydrocodone for back strain  Try amitriptyline at 20 mg (2 pills) per night to see if you tolerate  Follow up by May, consider follow up in November or December on diabetes

## 2018-08-15 NOTE — NURSING NOTE
"Chief Complaint   Patient presents with     Medication management       Initial /70 (BP Location: Right arm, Patient Position: Chair, Cuff Size: Adult Regular)  Pulse 64  Wt 171 lb 3.2 oz (77.7 kg)  BMI 28.27 kg/m2 Estimated body mass index is 28.27 kg/(m^2) as calculated from the following:    Height as of 10/16/17: 5' 5.25\" (1.657 m).    Weight as of this encounter: 171 lb 3.2 oz (77.7 kg).  Medication Reconciliation: complete    Madison Rodríguez LPN  "

## 2018-08-16 ASSESSMENT — ASTHMA QUESTIONNAIRES: ACT_TOTALSCORE: 20

## 2018-08-16 ASSESSMENT — PATIENT HEALTH QUESTIONNAIRE - PHQ9: SUM OF ALL RESPONSES TO PHQ QUESTIONS 1-9: 5

## 2018-08-16 ASSESSMENT — ANXIETY QUESTIONNAIRES: GAD7 TOTAL SCORE: 8

## 2018-08-19 DIAGNOSIS — I10 ESSENTIAL HYPERTENSION: ICD-10-CM

## 2018-08-21 NOTE — TELEPHONE ENCOUNTER
IBUPROFEN 800MG TABLETS      Last Written Prescription Date:  11/15/17  Last Fill Quantity: 180,   # refills: 2  Last Office Visit: 08/15/18  Future Office visit:   None scheduled    Routing refill request to provider for review/approval because:  Medication failed Protocol. Please fill if appropriate. Thank you.    Bria Sears RN on 8/21/2018 at 2:13 PM

## 2018-08-22 RX ORDER — IBUPROFEN 800 MG/1
TABLET, FILM COATED ORAL
Qty: 270 TABLET | Refills: 0 | Status: SHIPPED | OUTPATIENT
Start: 2018-08-22 | End: 2018-11-13

## 2018-08-27 ENCOUNTER — HOSPITAL ENCOUNTER (EMERGENCY)
Facility: OTHER | Age: 53
Discharge: HOME | End: 2018-08-27
Admitting: PHYSICIAN ASSISTANT
Payer: MEDICARE

## 2018-08-27 ENCOUNTER — APPOINTMENT (OUTPATIENT)
Dept: GENERAL RADIOLOGY | Facility: OTHER | Age: 53
End: 2018-08-27
Attending: EMERGENCY MEDICINE
Payer: MEDICARE

## 2018-08-27 ENCOUNTER — TELEPHONE (OUTPATIENT)
Dept: FAMILY MEDICINE | Facility: OTHER | Age: 53
End: 2018-08-27

## 2018-08-27 VITALS
RESPIRATION RATE: 20 BRPM | HEIGHT: 63 IN | DIASTOLIC BLOOD PRESSURE: 64 MMHG | HEART RATE: 81 BPM | SYSTOLIC BLOOD PRESSURE: 120 MMHG | TEMPERATURE: 98 F | OXYGEN SATURATION: 97 %

## 2018-08-27 DIAGNOSIS — S82.831A CLOSED FRACTURE OF DISTAL END OF RIGHT FIBULA, UNSPECIFIED FRACTURE MORPHOLOGY, INITIAL ENCOUNTER: ICD-10-CM

## 2018-08-27 PROCEDURE — 73610 X-RAY EXAM OF ANKLE: CPT | Mod: RT

## 2018-08-27 PROCEDURE — 99284 EMERGENCY DEPT VISIT MOD MDM: CPT | Mod: 25 | Performed by: PHYSICIAN ASSISTANT

## 2018-08-27 PROCEDURE — 25000132 ZZH RX MED GY IP 250 OP 250 PS 637: Mod: GY | Performed by: PHYSICIAN ASSISTANT

## 2018-08-27 PROCEDURE — A9270 NON-COVERED ITEM OR SERVICE: HCPCS | Mod: GY | Performed by: PHYSICIAN ASSISTANT

## 2018-08-27 PROCEDURE — 99283 EMERGENCY DEPT VISIT LOW MDM: CPT | Mod: Z6 | Performed by: PHYSICIAN ASSISTANT

## 2018-08-27 RX ORDER — OXYCODONE AND ACETAMINOPHEN 5; 325 MG/1; MG/1
2 TABLET ORAL ONCE
Status: DISCONTINUED | OUTPATIENT
Start: 2018-08-27 | End: 2018-08-27

## 2018-08-27 RX ORDER — OXYCODONE AND ACETAMINOPHEN 5; 325 MG/1; MG/1
1 TABLET ORAL ONCE
Status: COMPLETED | OUTPATIENT
Start: 2018-08-27 | End: 2018-08-27

## 2018-08-27 RX ORDER — OXYCODONE AND ACETAMINOPHEN 5; 325 MG/1; MG/1
1-2 TABLET ORAL EVERY 6 HOURS PRN
Qty: 20 TABLET | Refills: 0 | Status: SHIPPED | OUTPATIENT
Start: 2018-08-27 | End: 2018-12-03

## 2018-08-27 RX ADMIN — OXYCODONE HYDROCHLORIDE 2.5 MG: 5 TABLET ORAL at 19:22

## 2018-08-27 RX ADMIN — OXYCODONE HYDROCHLORIDE AND ACETAMINOPHEN 1 TABLET: 5; 325 TABLET ORAL at 18:59

## 2018-08-27 NOTE — ED AVS SNAPSHOT
[facility]    [address]    [place] [address]    Phone:  [phone]    Fax:  [phone]                                       Alejandra Villegas   MRN: [id number]    Department:  [facility]   Date of Visit:  8/27/2018           After Visit Summary Signature Page     I have received my discharge instructions, and my questions have been answered. I have discussed any challenges I see with this plan with the nurse or doctor.    ..........................................................................................................................................  Patient/Patient Representative Signature      ..........................................................................................................................................  Patient Representative Print Name and Relationship to Patient    ..................................................               ................................................  Date                                            Time    ..........................................................................................................................................  Reviewed by Signature/Title    ...................................................              ..............................................  Date                                                            Time          22EPIC Rev 08/18

## 2018-08-27 NOTE — ED AVS SNAPSHOT
[facility]    [address]    [place] [address]    Phone:  [phone]    Fax:  [phone]                                       Alejandra Villegas   MRN: [id number]    Department:  [facility]   Date of Visit:  8/27/2018           Patient Information     Date Of Birth          1965        Your diagnoses for this visit were:     Closed fracture of distal end of right fibula, unspecified fracture morphology, initial encounter       Follow-up Information     Follow up with iDpak Welsh MD.    Specialty:  Family Practice    Contact information:    [address]  [place] [address]  [phone]          Discharge Instructions       Cam walker.  Crutches.  Ice and elevate.  Ibuprofen for pain.  Percocet for pain at severe.  Follow-up with orthopedics.    24 Hour Appointment Hotline     To schedule an appointment at [facility], please call [phone]. If you don't have a family doctor or clinic, we will help you find one. [facility] [place] clinics are conveniently located to serve the needs of you and your family.           Review of your medicines      Our records show that you are taking the medicines listed below. If these are incorrect, please call your family doctor or clinic.        Dose / Directions Last dose taken    albuterol 108 (90 Base) MCG/ACT inhaler   Commonly known as:  PROAIR HFA/PROVENTIL HFA/VENTOLIN HFA   Dose:  2 puff   Quantity:  1 Inhaler        Inhale 2 puffs into the lungs every 4 hours as needed   Refills:  11        amitriptyline 10 MG tablet   Commonly known as:  ELAVIL   Quantity:  90 tablet        Start at 1 tab at bedtime for 3 days, then 2 tabs at bedtime for 3 days, then 3 tabs at bedtime.   Refills:  11        atorvastatin 40 MG tablet   Commonly known as:  LIPITOR   Dose:  40 mg   Quantity:  90 tablet        Take 1 tablet (40 mg) by mouth At Bedtime   Refills:  4        famotidine 20 MG tablet   Commonly  known as:  PEPCID   Dose:  20 mg   Quantity:  180 tablet        Take 1 tablet (20 mg) by mouth 2 times daily   Refills:  1        gabapentin 400 MG capsule   Commonly known as:  NEURONTIN   Dose:  400 mg   Quantity:  270 capsule        Take 1 capsule (400 mg) by mouth 3 times daily   Refills:  4        ibuprofen 800 MG tablet   Commonly known as:  ADVIL/MOTRIN   Quantity:  270 tablet        TAKE 1 TABLET BY MOUTH THREE TIMES DAILY AS NEEDED   Refills:  0        lisinopril 10 MG tablet   Commonly known as:  PRINIVIL/ZESTRIL   Dose:  10 mg   Quantity:  90 tablet        Take 1 tablet (10 mg) by mouth daily   Refills:  1        metFORMIN 500 MG tablet   Commonly known as:  GLUCOPHAGE   Dose:  500 mg   Quantity:  90 tablet        Take 1 tablet (500 mg) by mouth daily (with breakfast) Once daily with a meal   Refills:  4        PARoxetine 10 MG tablet   Commonly known as:  PAXIL   Quantity:  90 tablet        TAKE 1 TABLET BY MOUTH EVERY MORNING   Refills:  2                Procedures and tests performed during your visit     Obtain Provider order for Ankle Foot Orthotic (AFO) or Cam-Walker    XR Ankle Right G/E 3 Views      Orders Needing Specimen Collection     None      Pending Results     No orders found from 8/25/2018 to 8/28/2018.            Pending Culture Results     No orders found from 8/25/2018 to 8/28/2018.            Pending Results Instructions     If you had any lab results that were not finalized at the time of your Discharge, you can call the ED Lab Result RN at [phone]. You will be contacted by this team for any positive Lab results or changes in treatment. The nurses are available 7 days a week from 10A to 6:30P.  You can leave a message 24 hours per day and they will return your call.        Thank you for choosing [facility]       Thank you for choosing [facility] for your care. Our goal is always to provide you with excellent care. Hearing back from our patients is one way we can continue to improve  our services. Please take a few minutes to complete the written survey that you may receive in the mail after you visit with us. Thank you!        Care EveryWhere ID     This is your Care EveryWhere ID. This could be used by other organizations to access your [facility] medical records  [id number]        Equal Access to Services     MAXIMO ORTIZ : Louann Pan, wakavin watts, favio leealliane hewitt, charley frazier. So [facility] [phone].    ATENCIÓN: Si habla español, tiene a villagomez disposición servicios gratuitos de asistencia lingüística. Llame al [phone].    We comply with applicable federal civil rights laws and [place] laws. We do not discriminate on the basis of race, color, national origin, age, disability, sex, sexual orientation, or gender identity.            After Visit Summary       This is your record. Keep this with you and show to your community pharmacist(s) and doctor(s) at your next visit.

## 2018-08-27 NOTE — ED TRIAGE NOTES
"ED Nursing Triage Note (General)   ________________________________    Alejandra Villegas is a 52 year old Female that presents to triage private car  With history of  Turned ankle while going up steps, painful and heard a \"pop\" when turned, reported by patient   Significant symptoms had onset 1 hour(s) ago.  /65  Pulse 79  Temp 97.2  F (36.2  C) (Tympanic)  Resp 18  Ht 1.6 m (5' 3\")  SpO2 98%  Breastfeeding? Not  Patient appears alert , in moderate distress., and cooperative behavior.  Anxiety: anxious about pain  GCS 15  Airway: intact  Breathing noted as Normal.  Circulation Normal  Skin normal  Action taken:  To WR with ice on ankle and order for xray      PRE HOSPITAL PRIOR LIVING SITUATION Other:  With kids    COLUMBIA-SUICIDE SEVERITY RATING SCALE   Screen with Triage Points for Emergency Department      Ask questions that are bolded and underlined.   Past  month   Ask Questions 1 and 2 YES NO   1)  Have you wished you were dead or wished you could go to sleep and not wake up?   x   2)  Have you actually had any thoughts of killing yourself?   x   If YES to 2, ask questions 3, 4, 5, and 6.  If NO to 2, go directly to question 6.   3)  Have you been thinking about how you might do this?   E.g.  I thought about taking an overdose but I never made a specific plan as to when where or how I would actually do it .and I would never go through with it.    x   4)  Have you had these thoughts and had some intention of acting on them?   As opposed to  I have the thoughts but I definitely will not do anything about them.    x   5)  Have you started to work out or worked out the details of how to kill yourself? Do you intend to carry out this plan?   x   6)  Have you ever done anything, started to do anything, or prepared to do anything to end your life?  Examples: Collected pills, obtained a gun, gave away valuables, wrote a will or suicide note, took out pills but didn t swallow any, held a gun but changed " your mind or it was grabbed from your hand, went to the roof but didn t jump; or actually took pills, tried to shoot yourself, cut yourself, tried to hang yourself, etc.    If YES, ask: Was this within the past three months?  Lifetime     x    Past 3 Months        Item 1:  Behavioral Health Referral at Discharge  Item 2:  Behavioral Health Referral at Discharge   Item 3:  Behavioral Health Consult (Psychiatric Nurse/) and consider Patient Safety Precautions  Item 4:  Immediate Notification of Physician and/or Behavioral Health and Patient Safety Precautions   Item 5:  Immediate Notification of Physician and/or Behavioral Health and Patient Safety Precautions  Item 6:  Over 3 months ago: Behavioral Health Consult (Psychiatric Nurse/) and consider Patient Safety Precautions  OR  Item 6:  3 months ago or less: Immediate Notification of Physician and/or Behavioral Health and Patient Safety Precautions

## 2018-08-28 ENCOUNTER — TRANSFERRED RECORDS (OUTPATIENT)
Dept: HEALTH INFORMATION MANAGEMENT | Facility: OTHER | Age: 53
End: 2018-08-28

## 2018-08-28 NOTE — ED PROVIDER NOTES
History     Chief Complaint   Patient presents with     Ankle Pain     HPI presents here with left ankle pain she rolled her left ankle she has some swelling distally.  Numbness or tingling distally.  No other injuries or concerns.    Problem List:    Patient Active Problem List    Diagnosis Date Noted     Anxiety state 01/30/2018     Priority: Medium     Other isolated or specific phobias 01/30/2018     Priority: Medium     Degenerative disc disease at L5-S1 level 01/30/2018     Priority: Medium     Dyslipidemia 01/30/2018     Priority: Medium     Overview:   low HDL       Hidradenitis suppurativa 01/30/2018     Priority: Medium     Mild persistent asthma without complication 01/30/2018     Priority: Medium     Overview:   versus chronic obstructive pulmonary disease       Obesity 01/30/2018     Priority: Medium     Tobacco use disorder 01/30/2018     Priority: Medium     Gastroesophageal reflux disease 01/18/2017     Priority: Medium     Pain management contract broken 06/05/2015     Priority: Medium     Overview:   Contract violation - see 12/1/15 letter.       HTN (hypertension) 08/25/2014     Priority: Medium     Urge incontinence 06/04/2014     Priority: Medium     Alopecia areata 02/21/2014     Priority: Medium     Pyelonephritis due to Escherichia coli 06/08/2013     Priority: Medium     Benign paroxysmal positional vertigo 02/28/2013     Priority: Medium     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     Priority: Medium     Seasonal allergic rhinitis 09/27/2011     Priority: Medium     Symptomatic menopausal or female climacteric states 09/21/2010     Priority: Medium     Abdominal pain, left lower quadrant 08/06/2010     Priority: Medium     Other diseases of lung, not elsewhere classified 08/01/2007     Priority: Medium     Diabetes mellitus type 2, controlled, without complications (H) 07/01/2007     Priority: Medium        Past Medical History:    Past Medical History:   Diagnosis Date      Allergic rhinitis      Disorder of kidney and ureter      Dorsalgia      Excessive and frequent menstruation with regular cycle      Generalized anxiety disorder      Hidradenitis suppurativa      Hyperlipidemia      Obesity      Other disorders of lung (CODE)      Other specified disorders of Eustachian tube, unspecified ear      Other specified phobia      Personal history of other medical treatment (CODE)      Rheumatoid arthritis (H)      Tobacco use      Type 2 diabetes mellitus without complications (H)      Uncomplicated asthma        Past Surgical History:    Past Surgical History:   Procedure Laterality Date     ARTHROSCOPY KNEE      05/2017,Dr Torres     ARTHROSCOPY KNEE      07/20/2017,Dr Garza, lateral release, meniscal repair     ARTHROTOMY WRIST      2011,Left wrist surgery, Dr. Torres     CHOLECYSTECTOMY      06/07,Emergency tracheotomy following failed intubation for laparoscopic cholecystectomy.     DILATION AND CURETTAGE      09/06     HYSTERECTOMY TOTAL ABDOMINAL      2010     LAPAROSCOPIC ABLATION ENDOMETRIOSIS      09/06     OOPHORECTOMY      2010,Left oophorectomy, Dr. Thorpe     OTHER SURGICAL HISTORY      2007,,HERNIA REPAIR,Incisoinal hernia repair     OTHER SURGICAL HISTORY      01/06/14,579116,OTHER,Dr. Pb GIRON     OTHER SURGICAL HISTORY      2007,[id number],TRACHEOSTOMY,emergency following failed intubation during cholecystectomy     RELEASE CARPAL TUNNEL      02/02/2017     SALPINGO-OOPHORECTOMY BILATERAL      06/99, Right salpingo-oophorectomy for hemorrhagic corpus luteum cyst     TYMPANOSTOMY, LOCAL/TOPICAL ANESTHESIA      08/06, PE tube placement       Family History:    Family History   Problem Relation Age of Onset     Arthritis Mother      Arthritis,Rheumatoid     Cancer Mother      Cancer, lung and uterine cancer     HEART DISEASE Father      Heart Disease,CAD, CABG, peripheral vascular dise     Thyroid Disease Father      Thyroid Disease, hyperlipidemia     Other -  "See Comments Father      djd     Asthma Brother      Asthma     Asthma Sister      Asthma     Other - See Comments Sister      Psychiatric illness,Anxiety     Other - See Comments Son      x2 back surgeries     Breast Cancer No family hx of      Cancer-breast       Social History:  Marital Status:   [4]  Social History   Substance Use Topics     Smoking status: Current Every Day Smoker     Packs/day: 0.50     Years: 32.00     Types: Cigarettes     Smokeless tobacco: Never Used     Alcohol use No        Medications:      albuterol (PROAIR HFA/PROVENTIL HFA/VENTOLIN HFA) 108 (90 Base) MCG/ACT Inhaler   amitriptyline (ELAVIL) 10 MG tablet   atorvastatin (LIPITOR) 40 MG tablet   famotidine (PEPCID) 20 MG tablet   gabapentin (NEURONTIN) 400 MG capsule   ibuprofen (ADVIL/MOTRIN) 800 MG tablet   lisinopril (PRINIVIL/ZESTRIL) 10 MG tablet   metFORMIN (GLUCOPHAGE) 500 MG tablet   PARoxetine (PAXIL) 10 MG tablet   [DISCONTINUED] atorvastatin (LIPITOR) 40 MG tablet         Review of Systems  Per HPI    Physical Exam   BP: 107/65  Pulse: 79  Temp: 97.2  F (36.2  C)  Resp: 18  Height: 160 cm (5' 3\")  SpO2: 98 %      Physical Exam   Constitutional: She is oriented to person, place, and time. She appears well-developed and well-nourished. No distress.   HENT:   Head: Normocephalic and atraumatic.   Eyes: Conjunctivae are normal. No scleral icterus.   Neck: Neck supple.   Cardiovascular: Normal rate.    Pulmonary/Chest: Effort normal. No respiratory distress.   Musculoskeletal: She exhibits edema and tenderness. She exhibits no deformity.   Neurological: She is alert and oriented to person, place, and time.   Skin: Skin is warm and dry. She is not diaphoretic.   Psychiatric: She has a normal mood and affect. Her behavior is normal. Judgment and thought content normal.   Nursing note and vitals reviewed.      ED Course   X-rays reviewed she has a right distal fibula fracture nondisplaced.  Ankle mortise is intact.  I did " speak with orthopedics it is appropriate to put her into a cam walker with crutches.  Follow-up with orthopedics.  Was given 2 Percocet here for pain.  She was given some Percocet for home she is taking fluids well ice and elevate.  She is well.  ED Course     Procedures                   No results found for this or any previous visit (from the past 24 hour(s)).    Medications   oxyCODONE-acetaminophen (PERCOCET) 5-325 MG per tablet 1 tablet (1 tablet Oral Given 8/27/18 3537)       Assessments & Plan (with Medical Decision Making)     I have reviewed the nursing notes.    I have reviewed the findings, diagnosis, plan and need for follow up with the patient.      New Prescriptions    No medications on file       Final diagnoses:   Closed fracture of distal end of right fibula, unspecified fracture morphology, initial encounter       8/27/2018   [facility] [place]  08/27/18 [id number]

## 2018-08-28 NOTE — DISCHARGE INSTRUCTIONS
Cam walker.  Crutches.  Ice and elevate.  Ibuprofen for pain.  Percocet for pain at severe.  Follow-up with orthopedics.

## 2018-09-04 DIAGNOSIS — I10 ESSENTIAL HYPERTENSION: ICD-10-CM

## 2018-09-06 RX ORDER — LISINOPRIL 10 MG/1
TABLET ORAL
Qty: 90 TABLET | Refills: 0 | OUTPATIENT
Start: 2018-09-06

## 2018-09-06 NOTE — TELEPHONE ENCOUNTER
Filled 8/15/18 #90 x 1. Pharmacy alerted. Unable to complete prescription refill per RNMedication Refill Policy.................... Diana Boucher ....................  9/6/2018   2:18 PM    lisinopril (PRINIVIL/ZESTRIL) 10 MG tablet 90 tablet 1 8/15/2018  No   Sig - Route: Take 1 tablet (10 mg) by mouth daily - Oral   Class: E-Prescribe   Order: [id number]   E-Prescribing Status: Receipt confirmed by pharmacy (8/15/2018  3:59 PM CDT)   Printout Tracking   External Result Report   Pharmacy   [id number] - [place], [place] - [address]

## 2018-10-20 DIAGNOSIS — M51.379 DEGENERATIVE DISC DISEASE AT L5-S1 LEVEL: ICD-10-CM

## 2018-10-24 RX ORDER — GABAPENTIN 800 MG/1
TABLET ORAL
Qty: 270 TABLET | Refills: 0 | OUTPATIENT
Start: 2018-10-24

## 2018-10-24 NOTE — TELEPHONE ENCOUNTER
Chart review shows that Rx as requested from the pharmacy is not on patient's active med list at this time, however Rx as noted below is:    Medication Detail      Disp Refills Start End SYMONE   gabapentin (NEURONTIN) 400 MG capsule 270 capsule 4 5/11/2018  --   Sig - Route: Take 1 capsule (400 mg) by mouth 3 times daily - Oral   Class: E-Prescribe   Order: [id number]   E-Prescribing Status: Receipt confirmed by pharmacy (5/11/2018  2:21 PM CDT)   Printout Tracking   External Result Report   Pharmacy   [id number] - [place], [place] - [address]     Review of office visit notes on 5/11/18 with PCP indicating the following:    Would like less gabapentin for low back pain.  Taking 800 mg 3 times a day is too much. Reduce gabapentin from 800 mg 3 times daily to 400 mg 3 times daily.    Patient was subsequently seen by PCP on 8/15/18 as well for medication management. Rx as noted above was noted in office visit notes on that date with no changes. As per documentation as noted above, patient should be taking Rx as noted above for gabapentin 400 mg TID and not 800 mg TID as requested by the pharmacy. Writer will refuse Rx request in this encounter and make note of Rx as noted above in Rx refusal to pharmacy.    Unable to complete prescription refill per RN Medication Refill Policy. Armando Gunn 10/24/2018 8:37 AM

## 2018-11-07 DIAGNOSIS — K21.9 GASTROESOPHAGEAL REFLUX DISEASE, ESOPHAGITIS PRESENCE NOT SPECIFIED: ICD-10-CM

## 2018-11-09 RX ORDER — FAMOTIDINE 20 MG/1
20 TABLET, FILM COATED ORAL DAILY
Qty: 90 TABLET | Refills: 3 | Status: SHIPPED | OUTPATIENT
Start: 2018-11-09 | End: 2019-06-07

## 2018-11-09 NOTE — TELEPHONE ENCOUNTER
Chart review shows that patient with active order for Pepcid as follows in her chart:    Medication Detail         Disp Refills Start End SYMONE     famotidine (PEPCID) 20 MG tablet 180 tablet 1 5/11/2018  No     Sig - Route: Take 1 tablet (20 mg) by mouth 2 times daily - Oral     Class: E-Prescribe     Order: [id number]     E-Prescribing Status: Receipt confirmed by pharmacy (5/11/2018  2:21 PM CDT)       Printout Tracking      External Result Report       Pharmacy      [facility] [address]     Pharmacy is requesting order as follows:    FAMOTIDINE 20MG TABLETS  Will file in chart as: famotidine (PEPCID) 20 MG tablet  The source prescription has been discontinued.  TAKE 1 TABLET BY MOUTH EVERY DAY AS NEEDED       Disp: 90 tablet Refills: 0    Class: E-Prescribe Start: 11/7/2018   For: Gastroesophageal reflux disease, esophagitis presence not specified  Documented:9 months ago  Last refill:8/4/2018    Call placed to patient to clarify. Patient reports that neither Rx as noted above is correct. She is taking pepcid 20 mg tablet once daily. Is not in need of a refill until next week and PCP's return. Writer will jesus up and route Rx request to PCP for his consideration/approval. Will jesus up Rx as per patient report.    Unable to complete prescription refill per RN Medication Refill Policy. Armando Gunn 11/9/2018 2:53 PM

## 2018-11-13 DIAGNOSIS — I10 ESSENTIAL HYPERTENSION: ICD-10-CM

## 2018-11-15 NOTE — TELEPHONE ENCOUNTER
RN refill protocol fails as noted below:     Normal ALT on file in past 12 months           Recent Labs   Lab Test  09/17/17 2114   GICHALT  9                   Normal AST on file in past 12 months           Recent Labs   Lab Test  09/17/17 2114   GICHAST  11*                  Normal CBC on file in past 12 months           Recent Labs   Lab Test  09/17/17 2055   GICHWBC  11.3*   GICHRBC  4.26   HGB  14.2   HCT  40.1   PLT  338             Chart review shows that Rx as requested was last filled on 8/22/18 for a 90 day supply if patient is taking max daily dosing. PCP is out of the office until Monday 11/19/18 and writer is unable to fill Rx as requested as noted above. LOV with PCP was on 8/15/18. Writer will jesus up and route Rx request to PCP for his consideration/approval. Did contact patient as well. Advised her of plan as noted above. Patient is ok with waiting until Monday for a refill. States she has enough and is not in need of a refill prior to then. Writer will route Rx request to PCP for his consideration/approval.    Unable to complete prescription refill per RN Medication Refill Policy. Armando Gunn 11/15/2018 8:46 AM

## 2018-11-16 RX ORDER — IBUPROFEN 800 MG/1
TABLET, FILM COATED ORAL
Qty: 270 TABLET | Refills: 3 | Status: SHIPPED | OUTPATIENT
Start: 2018-11-16 | End: 2019-12-27

## 2018-11-27 ENCOUNTER — ALLIED HEALTH/NURSE VISIT (OUTPATIENT)
Dept: UROLOGY | Facility: OTHER | Age: 53
End: 2018-11-27
Attending: UROLOGY
Payer: MEDICARE

## 2018-11-27 VITALS — WEIGHT: 160.6 LBS | BODY MASS INDEX: 28.45 KG/M2 | RESPIRATION RATE: 16 BRPM | HEART RATE: 88 BPM

## 2018-11-27 DIAGNOSIS — N39.41 URGE INCONTINENCE: Primary | ICD-10-CM

## 2018-11-27 PROCEDURE — 99207 ZZC NO CHARGE LOS: CPT

## 2018-11-27 RX ORDER — CLINDAMYCIN PHOSPHATE 900 MG/50ML
900 INJECTION, SOLUTION INTRAVENOUS
Status: CANCELLED | OUTPATIENT
Start: 2018-11-27

## 2018-11-27 RX ORDER — GENTAMICIN SULFATE 60 MG/50ML
120 INJECTION, SOLUTION INTRAVENOUS
Status: CANCELLED | OUTPATIENT
Start: 2018-11-28

## 2018-11-27 ASSESSMENT — PAIN SCALES - GENERAL: PAINLEVEL: NO PAIN (0)

## 2018-11-27 NOTE — MR AVS SNAPSHOT
After Visit Summary   11/27/2018    Alejandra Villegas    MRN: [id number]           Patient Information     Date Of Birth          1965        Visit Information        Provider Department      11/27/2018 9:30 AM  UROLOGY NURSE [facility]        Today's Diagnoses     Urge incontinence    -  1       Follow-ups after your visit        Who to contact     If you have questions or need follow up information about today's clinic visit or your schedule please contact [facility] directly at [phone].  Normal or non-critical lab and imaging results will be communicated to you by MyChart, letter or phone within 4 business days after the clinic has received the results. If you do not hear from us within 7 days, please contact the clinic through MyChart or phone. If you have a critical or abnormal lab result, we will notify you by phone as soon as possible.  Submit refill requests through [employer] or call your pharmacy and they will forward the refill request to us. Please allow 3 business days for your refill to be completed.          Additional Information About Your Visit        Care EveryWhere ID     This is your Care EveryWhere ID. This could be used by other organizations to access your [facility] medical records  [id number]        Your Vitals Were     Pulse Respirations BMI (Body Mass Index)             88 16 28.45 kg/m2          Blood Pressure from Last 3 Encounters:   08/27/18 120/64   08/15/18 112/70   07/23/18 106/72    Weight from Last 3 Encounters:   11/27/18 72.8 kg (160 lb 9.6 oz)   08/15/18 77.7 kg (171 lb 3.2 oz)   08/07/18 77 kg (169 lb 12.8 oz)              Today, you had the following     No orders found for display       Primary Care Provider Office Phone # Fax #    Dipak Welsh [phone] [address] [facility] [address]        Equal Access to Services     MAXIMO ORTIZ : Louann do  Sotika, walilianada luqadaha, qaybta kaalmada marcelina, charley roldan miguelitocharlotte santosgerman freddie. So [facility] [phone].    ATENCIÓN: Si jhonny rouse, tiene a villagomez disposición servicios gratuitos de asistencia lingüística. Lexus al [phone].    We comply with applicable federal civil rights laws and [place] laws. We do not discriminate on the basis of race, color, national origin, age, disability, sex, sexual orientation, or gender identity.            Thank you!     Thank you for choosing [facility] AND [facility]  for your care. Our goal is always to provide you with excellent care. Hearing back from our patients is one way we can continue to improve our services. Please take a few minutes to complete the written survey that you may receive in the mail after your visit with us. Thank you!             Your Updated Medication List - Protect others around you: Learn how to safely use, store and throw away your medicines at www.disposemymeds.org.          This list is accurate as of 11/27/18  9:44 AM.  Always use your most recent med list.                   Brand Name Dispense Instructions for use Diagnosis    albuterol 108 (90 Base) MCG/ACT inhaler    PROAIR HFA/PROVENTIL HFA/VENTOLIN HFA    1 Inhaler    Inhale 2 puffs into the lungs every 4 hours as needed    Mild persistent asthma without complication       amitriptyline 10 MG tablet    ELAVIL    90 tablet    Start at 1 tab at bedtime for 3 days, then 2 tabs at bedtime for 3 days, then 3 tabs at bedtime.    Degenerative disc disease at L5-S1 level       atorvastatin 40 MG tablet    LIPITOR    90 tablet    Take 1 tablet (40 mg) by mouth At Bedtime    Dyslipidemia       * famotidine 20 MG tablet    PEPCID    180 tablet    Take 1 tablet (20 mg) by mouth 2 times daily    Gastroesophageal reflux disease, esophagitis presence not specified       * famotidine 20 MG tablet    PEPCID    90 tablet    Take 1 tablet (20 mg) by mouth daily    Gastroesophageal reflux  disease, esophagitis presence not specified       gabapentin 400 MG capsule    NEURONTIN    270 capsule    Take 1 capsule (400 mg) by mouth 3 times daily    Degenerative disc disease at L5-S1 level       ibuprofen 800 MG tablet    ADVIL/MOTRIN    270 tablet    TAKE 1 TABLET BY MOUTH THREE TIMES DAILY AS NEEDED    Essential hypertension       lisinopril 10 MG tablet    PRINIVIL/ZESTRIL    90 tablet    Take 1 tablet (10 mg) by mouth daily    Essential hypertension       metFORMIN 500 MG tablet    GLUCOPHAGE    90 tablet    Take 1 tablet (500 mg) by mouth daily (with breakfast) Once daily with a meal    Controlled type 2 diabetes mellitus without complication, without long-term current use of insulin (H)       oxyCODONE-acetaminophen 5-325 MG tablet    PERCOCET    20 tablet    Take 1-2 tablets by mouth every 6 hours as needed for pain (Every 4-6 hours as needed.)        PARoxetine 10 MG tablet    PAXIL    90 tablet    TAKE 1 TABLET BY MOUTH EVERY MORNING    Anxiety state       * Notice:  This list has 2 medication(s) that are the same as other medications prescribed for you. Read the directions carefully, and ask your doctor or other care provider to review them with you.

## 2018-11-27 NOTE — NURSING NOTE
Pt presents to clinic for a one year follow up   Battery is low and will need to be replaced, would like to schedule ASAP  Milla Davila LPN.......11/27/2018 9:37 AM

## 2018-12-03 ENCOUNTER — OFFICE VISIT (OUTPATIENT)
Dept: FAMILY MEDICINE | Facility: OTHER | Age: 53
End: 2018-12-03
Attending: FAMILY MEDICINE
Payer: MEDICARE

## 2018-12-03 VITALS
DIASTOLIC BLOOD PRESSURE: 66 MMHG | TEMPERATURE: 97.8 F | RESPIRATION RATE: 18 BRPM | HEIGHT: 63 IN | HEART RATE: 70 BPM | OXYGEN SATURATION: 96 % | SYSTOLIC BLOOD PRESSURE: 105 MMHG | WEIGHT: 163 LBS | BODY MASS INDEX: 28.88 KG/M2

## 2018-12-03 DIAGNOSIS — F17.200 TOBACCO USE DISORDER: ICD-10-CM

## 2018-12-03 DIAGNOSIS — Z01.818 PREOP GENERAL PHYSICAL EXAM: Primary | ICD-10-CM

## 2018-12-03 DIAGNOSIS — I10 ESSENTIAL HYPERTENSION: ICD-10-CM

## 2018-12-03 DIAGNOSIS — E11.9 CONTROLLED TYPE 2 DIABETES MELLITUS WITHOUT COMPLICATION, WITHOUT LONG-TERM CURRENT USE OF INSULIN (H): ICD-10-CM

## 2018-12-03 DIAGNOSIS — J45.30 MILD PERSISTENT ASTHMA WITHOUT COMPLICATION: ICD-10-CM

## 2018-12-03 LAB
ANION GAP SERPL CALCULATED.3IONS-SCNC: 6 MMOL/L (ref 3–14)
BUN SERPL-MCNC: 8 MG/DL (ref 7–25)
CALCIUM SERPL-MCNC: 9.5 MG/DL (ref 8.6–10.3)
CHLORIDE SERPL-SCNC: 100 MMOL/L (ref 98–107)
CHOLEST SERPL-MCNC: 149 MG/DL
CO2 SERPL-SCNC: 29 MMOL/L (ref 21–31)
CREAT SERPL-MCNC: 0.78 MG/DL (ref 0.6–1.2)
ERYTHROCYTE [DISTWIDTH] IN BLOOD BY AUTOMATED COUNT: 13.1 % (ref 10–15)
GFR SERPL CREATININE-BSD FRML MDRD: 78 ML/MIN/1.7M2
GLUCOSE SERPL-MCNC: 96 MG/DL (ref 70–105)
HBA1C MFR BLD: 5.8 % (ref 4–6)
HCT VFR BLD AUTO: 42 % (ref 35–47)
HDLC SERPL-MCNC: 36 MG/DL (ref 23–92)
HGB BLD-MCNC: 14.1 G/DL (ref 11.7–15.7)
LDLC SERPL CALC-MCNC: 77 MG/DL
MCH RBC QN AUTO: 32.1 PG (ref 26.5–33)
MCHC RBC AUTO-ENTMCNC: 33.6 G/DL (ref 31.5–36.5)
MCV RBC AUTO: 96 FL (ref 78–100)
NONHDLC SERPL-MCNC: 113 MG/DL
PLATELET # BLD AUTO: 332 10E9/L (ref 150–450)
POTASSIUM SERPL-SCNC: 3.7 MMOL/L (ref 3.5–5.1)
RBC # BLD AUTO: 4.39 10E12/L (ref 3.8–5.2)
SODIUM SERPL-SCNC: 135 MMOL/L (ref 134–144)
TRIGL SERPL-MCNC: 178 MG/DL
WBC # BLD AUTO: 8.1 10E9/L (ref 4–11)

## 2018-12-03 PROCEDURE — 80048 BASIC METABOLIC PNL TOTAL CA: CPT | Performed by: FAMILY MEDICINE

## 2018-12-03 PROCEDURE — 93005 ELECTROCARDIOGRAM TRACING: CPT

## 2018-12-03 PROCEDURE — 80061 LIPID PANEL: CPT | Performed by: FAMILY MEDICINE

## 2018-12-03 PROCEDURE — G0463 HOSPITAL OUTPT CLINIC VISIT: HCPCS | Mod: 25

## 2018-12-03 PROCEDURE — 85027 COMPLETE CBC AUTOMATED: CPT | Performed by: FAMILY MEDICINE

## 2018-12-03 PROCEDURE — 99214 OFFICE O/P EST MOD 30 MIN: CPT | Performed by: FAMILY MEDICINE

## 2018-12-03 PROCEDURE — 83036 HEMOGLOBIN GLYCOSYLATED A1C: CPT | Performed by: FAMILY MEDICINE

## 2018-12-03 PROCEDURE — 36415 COLL VENOUS BLD VENIPUNCTURE: CPT | Performed by: FAMILY MEDICINE

## 2018-12-03 PROCEDURE — 93010 ELECTROCARDIOGRAM REPORT: CPT | Performed by: INTERNAL MEDICINE

## 2018-12-03 ASSESSMENT — PAIN SCALES - GENERAL: PAINLEVEL: NO PAIN (0)

## 2018-12-03 NOTE — LETTER
December 4, 2018      Alejandra Villegas     [facility] [address]        Dear ,    We are writing to inform you of your test results. Everything is good. The A1c looks good and is stable. Cholesterol values are similar to past ones. Electrolytes and kidney tests are normal.  Blood counts are normal.      Resulted Orders   Hemoglobin A1c   Result Value Ref Range    Hemoglobin A1C 5.8 4.0 - 6.0 %   Basic Metabolic Panel   Result Value Ref Range    Sodium 135 134 - 144 mmol/L    Potassium 3.7 3.5 - 5.1 mmol/L    Chloride 100 98 - 107 mmol/L    Carbon Dioxide 29 21 - 31 mmol/L    Anion Gap 6 3 - 14 mmol/L    Glucose 96 70 - 105 mg/dL    Urea Nitrogen 8 7 - 25 mg/dL    Creatinine 0.78 0.60 - 1.20 mg/dL    GFR Estimate 78 >60 mL/min/1.7m2    GFR Estimate If Black >90 >60 mL/min/1.7m2    Calcium 9.5 8.6 - 10.3 mg/dL   CBC W PLT No Diff   Result Value Ref Range    WBC 8.1 4.0 - 11.0 10e9/L    RBC Count 4.39 3.8 - 5.2 10e12/L    Hemoglobin 14.1 11.7 - 15.7 g/dL    Hematocrit 42.0 35.0 - 47.0 %    MCV 96 78 - 100 fl    MCH 32.1 26.5 - 33.0 pg    MCHC 33.6 31.5 - 36.5 g/dL    RDW 13.1 10.0 - 15.0 %    Platelet Count 332 150 - 450 10e9/L   Lipid Panel   Result Value Ref Range    Cholesterol 149 <200 mg/dL    Triglycerides 178 (H) <150 mg/dL      Comment:      Borderline high:  150-199 mg/dl  High:             200-499 mg/dl  Very high:       >499 mg/dl      HDL Cholesterol 36 23 - 92 mg/dL    LDL Cholesterol Calculated 77 <100 mg/dL      Comment:      Desirable:       <100 mg/dl    Non HDL Cholesterol 113 <130 mg/dL       If you have any questions or concerns, please call the clinic at the number listed above.       Sincerely,        Dipak Welsh MD

## 2018-12-03 NOTE — H&P (VIEW-ONLY)
[facility]  [address]   [place] [address]  [phone]    PRE-OP EVALUATION:  Today's date: 12/3/2018    Alejandra Villegas (: 1965) presents for pre-operative evaluation assessment as requested by Dr. Ambriz.  She requires evaluation and anesthesia risk assessment prior to undergoing surgery/procedure for treatment of Intusim battery replacement .    Proposed Surgery/ Procedure: Intusim battery replacement  Date of Surgery/ Procedure: 18  Time of Surgery/ Procedure: [facility]  Hospital/Surgical Facility: [facility]  Fax number for surgical facility: NA  Primary Physician: Dipak Welsh  Type of Anesthesia Anticipated: to be determined    Patient has a Health Care Directive or Living Will:  NO    1. NO - Do you have a history of heart attack, stroke, stent, bypass or surgery on an artery in the head, neck, heart or legs?  2. NO - Do you ever have any pain or discomfort in your chest?  3. NO - Do you have a history of  Heart Failure?  4. NO - Are you troubled by shortness of breath when: walking on the level, up a slight hill or at night?  5. NO - Do you currently have a cold, bronchitis or other respiratory infection?  6. NO - Do you have a cough, shortness of breath or wheezing?  7. NO - Do you sometimes get pains in the calves of your legs when you walk?  8. NO - Do you or anyone in your family have previous history of blood clots?  9. NO - Do you or does anyone in your family have a serious bleeding problem such as prolonged bleeding following surgeries or cuts?  10. NO - Have you ever had problems with anemia or been told to take iron pills?  11. NO - Have you had any abnormal blood loss such as black, tarry or bloody stools, or abnormal vaginal bleeding?  12. NO - Have you ever had a blood transfusion?  13. NO - Have you or any of your relatives ever had problems with anesthesia?  14. NO - Do you have sleep apnea, excessive snoring or daytime drowsiness?  15. NO - Do you have  any prosthetic heart valves?  16. NO - Do you have prosthetic joints?  17. NO - Is there any chance that you may be pregnant?      HPI:     HPI related to upcoming procedure: 52 year old female with diabetes, hypertension, and tobacco abuse here for preoperative clearance prior to interstim battery replacement. She feels well. Exceeds 4 METS activity without CP or SOB. Diabetes has been well controlled.    MEDICAL HISTORY:     Patient Active Problem List    Diagnosis Date Noted     Anxiety state 01/30/2018     Priority: Medium     Other isolated or specific phobias 01/30/2018     Priority: Medium     Degenerative disc disease at L5-S1 level 01/30/2018     Priority: Medium     Dyslipidemia 01/30/2018     Priority: Medium     Overview:   low HDL       Hidradenitis suppurativa 01/30/2018     Priority: Medium     Mild persistent asthma without complication 01/30/2018     Priority: Medium     Overview:   versus chronic obstructive pulmonary disease       Obesity 01/30/2018     Priority: Medium     Tobacco use disorder 01/30/2018     Priority: Medium     Gastroesophageal reflux disease 01/18/2017     Priority: Medium     Pain management contract broken 06/05/2015     Priority: Medium     Overview:   Contract violation - see 12/1/15 letter.       HTN (hypertension) 08/25/2014     Priority: Medium     Urge incontinence 06/04/2014     Priority: Medium     Alopecia areata 02/21/2014     Priority: Medium     Pyelonephritis due to Escherichia coli 06/08/2013     Priority: Medium     Benign paroxysmal positional vertigo 02/28/2013     Priority: Medium     Ovarian cyst, left 05/09/2012     Priority: Medium     Other acquired deformity of ankle and foot(736.79) 05/09/2012     Priority: Medium     RA (rheumatoid arthritis) (H) 05/09/2012     Priority: Medium     Overview:   Diagnosed [place] 2012       Asthma 05/04/2012     Priority: Medium     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     Priority: Medium      Seasonal allergic rhinitis 09/27/2011     Priority: Medium     Symptomatic menopausal or female climacteric states 09/21/2010     Priority: Medium     Abdominal pain, left lower quadrant 08/06/2010     Priority: Medium     Other diseases of lung, not elsewhere classified 08/01/2007     Priority: Medium     Diabetes mellitus type 2, controlled, without complications (H) 07/01/2007     Priority: Medium      Past Medical History:   Diagnosis Date     Allergic rhinitis 09/27/2011          Disorder of kidney and ureter 08/08/2008    Kidney disease GFR 87     Dorsalgia     No Comments Provided     Excessive and frequent menstruation with regular cycle     Menorrhagia     Generalized anxiety disorder     No Comments Provided     Hidradenitis suppurativa     No Comments Provided     Hyperlipidemia     No Comments Provided     Obesity 07/01/2007    Obesity  Body-mass index over 30     Other disorders of lung (CODE) 08/01/2007    Pulmonary nodules, stable on follow-up chest CT 12/08.  No further imaging recommended.     Other specified disorders of Eustachian tube, unspecified ear 02/27/2012          Other specified phobia     No Comments Provided     Personal history of other medical treatment (CODE)     Childbirth x4     Rheumatoid arthritis (H) 02/27/2012          Tobacco use     No Comments Provided     Type 2 diabetes mellitus without complications (H) 07/01/2007          Uncomplicated asthma     No Comments Provided     Past Surgical History:   Procedure Laterality Date     ARTHROSCOPY KNEE  05/2017    Dr Torres     ARTHROSCOPY KNEE  07/20/2017    Dr Garza, lateral release, meniscal repair     ARTHROTOMY WRIST Left 2011    Dr. Torres     CHOLECYSTECTOMY  06/2007    Emergency tracheotomy following failed intubation for laparoscopic cholecystectomy.     DILATION AND CURETTAGE  09/2006          HERNIA REPAIR  2007    Incisoinal hernia repair     HYSTERECTOMY TOTAL ABDOMINAL  02/2010          INTERSTIM DEVICE STAGE 2   01/06/2014    Dr. Pb GIRON     LAPAROSCOPIC ABLATION ENDOMETRIOSIS  09/2006          OOPHORECTOMY Left 2010     Dr Thorpe     RELEASE CARPAL TUNNEL  02/02/2017          SALPINGO OOPHORECTOMY,R/L/KELSEY Right 06/1999    Right salpingo-oophorectomy for hemorrhagic corpus luteum cyst     TRACHEOSTOMY  2007    emergency following failed intubation during cholecystectomy     TYMPANOSTOMY, LOCAL/TOPICAL ANESTHESIA  08/2006    PE tube placement     Current Outpatient Prescriptions   Medication Sig Dispense Refill     albuterol (PROAIR HFA/PROVENTIL HFA/VENTOLIN HFA) 108 (90 Base) MCG/ACT Inhaler Inhale 2 puffs into the lungs every 4 hours as needed 1 Inhaler 11     amitriptyline (ELAVIL) 10 MG tablet Start at 1 tab at bedtime for 3 days, then 2 tabs at bedtime for 3 days, then 3 tabs at bedtime. 90 tablet 11     atorvastatin (LIPITOR) 40 MG tablet Take 1 tablet (40 mg) by mouth At Bedtime 90 tablet 4     famotidine (PEPCID) 20 MG tablet Take 1 tablet (20 mg) by mouth daily 90 tablet 3     gabapentin (NEURONTIN) 400 MG capsule Take 1 capsule (400 mg) by mouth 3 times daily 270 capsule 4     ibuprofen (ADVIL/MOTRIN) 800 MG tablet TAKE 1 TABLET BY MOUTH THREE TIMES DAILY AS NEEDED 270 tablet 3     lisinopril (PRINIVIL/ZESTRIL) 10 MG tablet Take 1 tablet (10 mg) by mouth daily 90 tablet 1     metFORMIN (GLUCOPHAGE) 500 MG tablet Take 1 tablet (500 mg) by mouth daily (with breakfast) Once daily with a meal 90 tablet 4     PARoxetine (PAXIL) 10 MG tablet TAKE 1 TABLET BY MOUTH EVERY MORNING 90 tablet 2     OTC products: None, except as noted above    Allergies   Allergen Reactions     Adhesive Tape      Bee Pollen Swelling     Seasonal     Codeine      Other reaction(s): Headache  Tylenol #3     Folic Acid Itching     Pollen Extract      Seasonal     Amoxicillin Rash     Liquid Adhesive Rash     Nabumetone GI Disturbance     GI upset      Latex Allergy: NO    Social History   Substance Use Topics     Smoking status: Current  "Every Day Smoker     Packs/day: 0.50     Years: 32.00     Types: Cigarettes     Smokeless tobacco: Never Used     Alcohol use No     History   Drug Use No     Comment: Drug use: No       REVIEW OF SYSTEMS:   General: Denies general constitutional problems  Eyes: Denies problems  Ears/Nose/Throat: Denies problems  Cardiovascular: Denies problems  Respiratory: Denies problems  Gastrointestinal: Denies problems  Skin: Denies problems  Neurologic: Denies problems  Psychiatric: Denies problems      EXAM:   /66 (BP Location: Right arm, Patient Position: Sitting, Cuff Size: Adult Regular)  Pulse 70  Temp 97.8  F (36.6  C) (Oral)  Resp 18  Ht 5' 3\" (1.6 m)  Wt 163 lb (73.9 kg)  SpO2 96%  BMI 28.87 kg/m2  General Appearance: Pleasant, alert, appropriate appearance for age. No acute distress  Ear Exam: Normal TM's bilaterally.   OroPharynx Exam: Dental hygiene adequate. Normal buccal mucosa. Normal pharynx. Mallampati 4/4.  Neck Exam: Supple, no masses or nodes.  Chest/Respiratory Exam: Normal chest wall and respirations. Clear to auscultation.  Cardiovascular Exam: Regular rate and rhythm. S1, S2, no murmur, click, gallop, or rubs.  Extremities: 2 + pedal pulses.  No lower extremity edema.  Psychiatric: Normal affect and mentation      DIAGNOSTICS:   EKG: appears normal, NSR, normal axis, normal intervals, no acute ST/T changes c/w ischemia, no LVH by voltage criteria, unchanged from previous tracings    Recent Labs   Lab Test  05/11/18   1422  09/17/17   2114  09/17/17   2055   08/16/17   1606   HGB   --    --   14.2   --   15.3   PLT   --    --   338   --   319   NA  137  138   --    < >   --    POTASSIUM  3.7  3.5   --    < >   --    CR  0.72  0.72   --    < >   --    A1C  5.8   --    --    --    --     < > = values in this interval not displayed.      Results for orders placed or performed in visit on 12/03/18   Hemoglobin A1c   Result Value Ref Range    Hemoglobin A1C 5.8 4.0 - 6.0 %   Basic Metabolic Panel "   Result Value Ref Range    Sodium 135 134 - 144 mmol/L    Potassium 3.7 3.5 - 5.1 mmol/L    Chloride 100 98 - 107 mmol/L    Carbon Dioxide 29 21 - 31 mmol/L    Anion Gap 6 3 - 14 mmol/L    Glucose 96 70 - 105 mg/dL    Urea Nitrogen 8 7 - 25 mg/dL    Creatinine 0.78 0.60 - 1.20 mg/dL    GFR Estimate 78 >60 mL/min/1.7m2    GFR Estimate If Black >90 >60 mL/min/1.7m2    Calcium 9.5 8.6 - 10.3 mg/dL   CBC W PLT No Diff   Result Value Ref Range    WBC 8.1 4.0 - 11.0 10e9/L    RBC Count 4.39 3.8 - 5.2 10e12/L    Hemoglobin 14.1 11.7 - 15.7 g/dL    Hematocrit 42.0 35.0 - 47.0 %    MCV 96 78 - 100 fl    MCH 32.1 26.5 - 33.0 pg    MCHC 33.6 31.5 - 36.5 g/dL    RDW 13.1 10.0 - 15.0 %    Platelet Count 332 150 - 450 10e9/L   Lipid Panel   Result Value Ref Range    Cholesterol 149 <200 mg/dL    Triglycerides 178 (H) <150 mg/dL    HDL Cholesterol 36 23 - 92 mg/dL    LDL Cholesterol Calculated 77 <100 mg/dL    Non HDL Cholesterol 113 <130 mg/dL        IMPRESSION:       ICD-10-CM    1. Preop general physical exam Z01.818    2. Mild persistent asthma without complication J45.30    3. Tobacco use disorder F17.200    4. Controlled type 2 diabetes mellitus without complication, without long-term current use of insulin (H) E11.9 EKG [facility] READ     Lipid Panel     Hemoglobin A1c     Lipid Panel   5. Essential hypertension I10 EKG [facility] READ     Basic Metabolic Panel     CBC W PLT No Diff     Basic Metabolic Panel     CBC W PLT No Diff         The proposed surgical procedure is considered LOW risk.    REVISED CARDIAC RISK INDEX  The patient has the following serious cardiovascular risks for perioperative complications such as (MI, PE, VFib and 3  AV Block):  No serious cardiac risks  INTERPRETATION: 0 risks: Class I (very low risk - 0.4% complication rate)    The patient has the following additional risks for perioperative complications:  No identified additional risks        RECOMMENDATIONS:       Hold all medication  AM of surgery and may take after procedure.    APPROVAL GIVEN to proceed with proposed procedure, without further diagnostic evaluation       Signed Electronically by: Dipak Welsh MD    Copy of this evaluation report is provided to requesting physician.    [facility] Preop Guidelines    Revised Cardiac Risk Index

## 2018-12-03 NOTE — PROGRESS NOTES
[facility]  [address]   [place] [address]  [phone]    PRE-OP EVALUATION:  Today's date: 12/3/2018    Alejandra Villegas (: 1965) presents for pre-operative evaluation assessment as requested by Dr. Ambriz.  She requires evaluation and anesthesia risk assessment prior to undergoing surgery/procedure for treatment of Intusim battery replacement .    Proposed Surgery/ Procedure: Intusim battery replacement  Date of Surgery/ Procedure: 18  Time of Surgery/ Procedure: [facility]  Hospital/Surgical Facility: [facility]  Fax number for surgical facility: NA  Primary Physician: Dipak Welsh  Type of Anesthesia Anticipated: to be determined    Patient has a Health Care Directive or Living Will:  NO    1. NO - Do you have a history of heart attack, stroke, stent, bypass or surgery on an artery in the head, neck, heart or legs?  2. NO - Do you ever have any pain or discomfort in your chest?  3. NO - Do you have a history of  Heart Failure?  4. NO - Are you troubled by shortness of breath when: walking on the level, up a slight hill or at night?  5. NO - Do you currently have a cold, bronchitis or other respiratory infection?  6. NO - Do you have a cough, shortness of breath or wheezing?  7. NO - Do you sometimes get pains in the calves of your legs when you walk?  8. NO - Do you or anyone in your family have previous history of blood clots?  9. NO - Do you or does anyone in your family have a serious bleeding problem such as prolonged bleeding following surgeries or cuts?  10. NO - Have you ever had problems with anemia or been told to take iron pills?  11. NO - Have you had any abnormal blood loss such as black, tarry or bloody stools, or abnormal vaginal bleeding?  12. NO - Have you ever had a blood transfusion?  13. NO - Have you or any of your relatives ever had problems with anesthesia?  14. NO - Do you have sleep apnea, excessive snoring or daytime drowsiness?  15. NO - Do you have  any prosthetic heart valves?  16. NO - Do you have prosthetic joints?  17. NO - Is there any chance that you may be pregnant?      HPI:     HPI related to upcoming procedure: 52 year old female with diabetes, hypertension, and tobacco abuse here for preoperative clearance prior to interstim battery replacement. She feels well. Exceeds 4 METS activity without CP or SOB. Diabetes has been well controlled.    MEDICAL HISTORY:     Patient Active Problem List    Diagnosis Date Noted     Anxiety state 01/30/2018     Priority: Medium     Other isolated or specific phobias 01/30/2018     Priority: Medium     Degenerative disc disease at L5-S1 level 01/30/2018     Priority: Medium     Dyslipidemia 01/30/2018     Priority: Medium     Overview:   low HDL       Hidradenitis suppurativa 01/30/2018     Priority: Medium     Mild persistent asthma without complication 01/30/2018     Priority: Medium     Overview:   versus chronic obstructive pulmonary disease       Obesity 01/30/2018     Priority: Medium     Tobacco use disorder 01/30/2018     Priority: Medium     Gastroesophageal reflux disease 01/18/2017     Priority: Medium     Pain management contract broken 06/05/2015     Priority: Medium     Overview:   Contract violation - see 12/1/15 letter.       HTN (hypertension) 08/25/2014     Priority: Medium     Urge incontinence 06/04/2014     Priority: Medium     Alopecia areata 02/21/2014     Priority: Medium     Pyelonephritis due to Escherichia coli 06/08/2013     Priority: Medium     Benign paroxysmal positional vertigo 02/28/2013     Priority: Medium     Ovarian cyst, left 05/09/2012     Priority: Medium     Other acquired deformity of ankle and foot(736.79) 05/09/2012     Priority: Medium     RA (rheumatoid arthritis) (H) 05/09/2012     Priority: Medium     Overview:   Diagnosed [place] 2012       Asthma 05/04/2012     Priority: Medium     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     Priority: Medium      Seasonal allergic rhinitis 09/27/2011     Priority: Medium     Symptomatic menopausal or female climacteric states 09/21/2010     Priority: Medium     Abdominal pain, left lower quadrant 08/06/2010     Priority: Medium     Other diseases of lung, not elsewhere classified 08/01/2007     Priority: Medium     Diabetes mellitus type 2, controlled, without complications (H) 07/01/2007     Priority: Medium      Past Medical History:   Diagnosis Date     Allergic rhinitis 09/27/2011          Disorder of kidney and ureter 08/08/2008    Kidney disease GFR 87     Dorsalgia     No Comments Provided     Excessive and frequent menstruation with regular cycle     Menorrhagia     Generalized anxiety disorder     No Comments Provided     Hidradenitis suppurativa     No Comments Provided     Hyperlipidemia     No Comments Provided     Obesity 07/01/2007    Obesity  Body-mass index over 30     Other disorders of lung (CODE) 08/01/2007    Pulmonary nodules, stable on follow-up chest CT 12/08.  No further imaging recommended.     Other specified disorders of Eustachian tube, unspecified ear 02/27/2012          Other specified phobia     No Comments Provided     Personal history of other medical treatment (CODE)     Childbirth x4     Rheumatoid arthritis (H) 02/27/2012          Tobacco use     No Comments Provided     Type 2 diabetes mellitus without complications (H) 07/01/2007          Uncomplicated asthma     No Comments Provided     Past Surgical History:   Procedure Laterality Date     ARTHROSCOPY KNEE  05/2017    Dr Torres     ARTHROSCOPY KNEE  07/20/2017    Dr Garza, lateral release, meniscal repair     ARTHROTOMY WRIST Left 2011    Dr. Torres     CHOLECYSTECTOMY  06/2007    Emergency tracheotomy following failed intubation for laparoscopic cholecystectomy.     DILATION AND CURETTAGE  09/2006          HERNIA REPAIR  2007    Incisoinal hernia repair     HYSTERECTOMY TOTAL ABDOMINAL  02/2010          INTERSTIM DEVICE STAGE 2   01/06/2014    Dr. Pb GIRON     LAPAROSCOPIC ABLATION ENDOMETRIOSIS  09/2006          OOPHORECTOMY Left 2010     Dr Thorpe     RELEASE CARPAL TUNNEL  02/02/2017          SALPINGO OOPHORECTOMY,R/L/KELSEY Right 06/1999    Right salpingo-oophorectomy for hemorrhagic corpus luteum cyst     TRACHEOSTOMY  2007    emergency following failed intubation during cholecystectomy     TYMPANOSTOMY, LOCAL/TOPICAL ANESTHESIA  08/2006    PE tube placement     Current Outpatient Prescriptions   Medication Sig Dispense Refill     albuterol (PROAIR HFA/PROVENTIL HFA/VENTOLIN HFA) 108 (90 Base) MCG/ACT Inhaler Inhale 2 puffs into the lungs every 4 hours as needed 1 Inhaler 11     amitriptyline (ELAVIL) 10 MG tablet Start at 1 tab at bedtime for 3 days, then 2 tabs at bedtime for 3 days, then 3 tabs at bedtime. 90 tablet 11     atorvastatin (LIPITOR) 40 MG tablet Take 1 tablet (40 mg) by mouth At Bedtime 90 tablet 4     famotidine (PEPCID) 20 MG tablet Take 1 tablet (20 mg) by mouth daily 90 tablet 3     gabapentin (NEURONTIN) 400 MG capsule Take 1 capsule (400 mg) by mouth 3 times daily 270 capsule 4     ibuprofen (ADVIL/MOTRIN) 800 MG tablet TAKE 1 TABLET BY MOUTH THREE TIMES DAILY AS NEEDED 270 tablet 3     lisinopril (PRINIVIL/ZESTRIL) 10 MG tablet Take 1 tablet (10 mg) by mouth daily 90 tablet 1     metFORMIN (GLUCOPHAGE) 500 MG tablet Take 1 tablet (500 mg) by mouth daily (with breakfast) Once daily with a meal 90 tablet 4     PARoxetine (PAXIL) 10 MG tablet TAKE 1 TABLET BY MOUTH EVERY MORNING 90 tablet 2     OTC products: None, except as noted above    Allergies   Allergen Reactions     Adhesive Tape      Bee Pollen Swelling     Seasonal     Codeine      Other reaction(s): Headache  Tylenol #3     Folic Acid Itching     Pollen Extract      Seasonal     Amoxicillin Rash     Liquid Adhesive Rash     Nabumetone GI Disturbance     GI upset      Latex Allergy: NO    Social History   Substance Use Topics     Smoking status: Current  "Every Day Smoker     Packs/day: 0.50     Years: 32.00     Types: Cigarettes     Smokeless tobacco: Never Used     Alcohol use No     History   Drug Use No     Comment: Drug use: No       REVIEW OF SYSTEMS:   General: Denies general constitutional problems  Eyes: Denies problems  Ears/Nose/Throat: Denies problems  Cardiovascular: Denies problems  Respiratory: Denies problems  Gastrointestinal: Denies problems  Skin: Denies problems  Neurologic: Denies problems  Psychiatric: Denies problems      EXAM:   /66 (BP Location: Right arm, Patient Position: Sitting, Cuff Size: Adult Regular)  Pulse 70  Temp 97.8  F (36.6  C) (Oral)  Resp 18  Ht 5' 3\" (1.6 m)  Wt 163 lb (73.9 kg)  SpO2 96%  BMI 28.87 kg/m2  General Appearance: Pleasant, alert, appropriate appearance for age. No acute distress  Ear Exam: Normal TM's bilaterally.   OroPharynx Exam: Dental hygiene adequate. Normal buccal mucosa. Normal pharynx. Mallampati 4/4.  Neck Exam: Supple, no masses or nodes.  Chest/Respiratory Exam: Normal chest wall and respirations. Clear to auscultation.  Cardiovascular Exam: Regular rate and rhythm. S1, S2, no murmur, click, gallop, or rubs.  Extremities: 2 + pedal pulses.  No lower extremity edema.  Psychiatric: Normal affect and mentation      DIAGNOSTICS:   EKG: appears normal, NSR, normal axis, normal intervals, no acute ST/T changes c/w ischemia, no LVH by voltage criteria, unchanged from previous tracings    Recent Labs   Lab Test  05/11/18   1422  09/17/17   2114  09/17/17   2055   08/16/17   1606   HGB   --    --   14.2   --   15.3   PLT   --    --   338   --   319   NA  137  138   --    < >   --    POTASSIUM  3.7  3.5   --    < >   --    CR  0.72  0.72   --    < >   --    A1C  5.8   --    --    --    --     < > = values in this interval not displayed.      Results for orders placed or performed in visit on 12/03/18   Hemoglobin A1c   Result Value Ref Range    Hemoglobin A1C 5.8 4.0 - 6.0 %   Basic Metabolic Panel "   Result Value Ref Range    Sodium 135 134 - 144 mmol/L    Potassium 3.7 3.5 - 5.1 mmol/L    Chloride 100 98 - 107 mmol/L    Carbon Dioxide 29 21 - 31 mmol/L    Anion Gap 6 3 - 14 mmol/L    Glucose 96 70 - 105 mg/dL    Urea Nitrogen 8 7 - 25 mg/dL    Creatinine 0.78 0.60 - 1.20 mg/dL    GFR Estimate 78 >60 mL/min/1.7m2    GFR Estimate If Black >90 >60 mL/min/1.7m2    Calcium 9.5 8.6 - 10.3 mg/dL   CBC W PLT No Diff   Result Value Ref Range    WBC 8.1 4.0 - 11.0 10e9/L    RBC Count 4.39 3.8 - 5.2 10e12/L    Hemoglobin 14.1 11.7 - 15.7 g/dL    Hematocrit 42.0 35.0 - 47.0 %    MCV 96 78 - 100 fl    MCH 32.1 26.5 - 33.0 pg    MCHC 33.6 31.5 - 36.5 g/dL    RDW 13.1 10.0 - 15.0 %    Platelet Count 332 150 - 450 10e9/L   Lipid Panel   Result Value Ref Range    Cholesterol 149 <200 mg/dL    Triglycerides 178 (H) <150 mg/dL    HDL Cholesterol 36 23 - 92 mg/dL    LDL Cholesterol Calculated 77 <100 mg/dL    Non HDL Cholesterol 113 <130 mg/dL        IMPRESSION:       ICD-10-CM    1. Preop general physical exam Z01.818    2. Mild persistent asthma without complication J45.30    3. Tobacco use disorder F17.200    4. Controlled type 2 diabetes mellitus without complication, without long-term current use of insulin (H) E11.9 EKG 12-LEAD CLINIC READ     Lipid Panel     Hemoglobin A1c     Lipid Panel   5. Essential hypertension I10 EKG 12-[facility] CLINIC READ     Basic Metabolic Panel     CBC W PLT No Diff     Basic Metabolic Panel     CBC W PLT No Diff         The proposed surgical procedure is considered LOW risk.    REVISED CARDIAC RISK INDEX  The patient has the following serious cardiovascular risks for perioperative complications such as (MI, PE, VFib and 3  AV Block):  No serious cardiac risks  INTERPRETATION: 0 risks: Class I (very low risk - 0.4% complication rate)    The patient has the following additional risks for perioperative complications:  No identified additional risks        RECOMMENDATIONS:       Hold all medication  AM of surgery and may take after procedure.    APPROVAL GIVEN to proceed with proposed procedure, without further diagnostic evaluation       Signed Electronically by: Dipak Welsh MD    Copy of this evaluation report is provided to requesting physician.    [place] Preop Guidelines    Revised Cardiac Risk Index

## 2018-12-03 NOTE — PATIENT INSTRUCTIONS
Hold on medications the morning of surgery and can take them after when you get home      Before Your Surgery      Call your surgeon if there is any change in your health. This includes signs of a cold or flu (such as a sore throat, runny nose, cough, rash or fever).    Do not smoke, drink alcohol or take over the counter medicine (unless your surgeon or primary care doctor tells you to) for the 24 hours before and after surgery.    If you take prescribed drugs: Follow your doctor s orders about which medicines to take and which to stop until after surgery.    Eating and drinking prior to surgery: follow the instructions from your surgeon    Take a shower or bath the night before surgery. Use the soap your surgeon gave you to gently clean your skin. If you do not have soap from your surgeon, use your regular soap. Do not shave or scrub the surgery site.  Wear clean pajamas and have clean sheets on your bed.

## 2018-12-03 NOTE — MR AVS SNAPSHOT
After Visit Summary   12/3/2018    Alejandra Villegas    MRN: [id number]           Patient Information     Date Of Birth          1965        Visit Information        Provider Department      12/3/2018 4:00 PM Dipak Welsh MD [facility]        Today's Diagnoses     Preop general physical exam    -  1    Mild persistent asthma without complication        Tobacco use disorder        Controlled type 2 diabetes mellitus without complication, without long-term current use of insulin (H)        Essential hypertension          Care Instructions    Hold on medications the morning of surgery and can take them after when you get home      Before Your Surgery      Call your surgeon if there is any change in your health. This includes signs of a cold or flu (such as a sore throat, runny nose, cough, rash or fever).    Do not smoke, drink alcohol or take over the counter medicine (unless your surgeon or primary care doctor tells you to) for the 24 hours before and after surgery.    If you take prescribed drugs: Follow your doctor s orders about which medicines to take and which to stop until after surgery.    Eating and drinking prior to surgery: follow the instructions from your surgeon    Take a shower or bath the night before surgery. Use the soap your surgeon gave you to gently clean your skin. If you do not have soap from your surgeon, use your regular soap. Do not shave or scrub the surgery site.  Wear clean pajamas and have clean sheets on your bed.           Follow-ups after your visit        Your next 10 appointments already scheduled     Dec 11, 2018   Procedure with Rl Ambriz MD   [facility] ([facility])    [address]  [facility] [id number] [phone]              Who to contact     If you have questions or need follow up information about today's clinic visit or your schedule please contact [facility]  [facility] directly at [phone].  Normal or non-critical lab and imaging results will be communicated to you by MyChart, letter or phone within 4 business days after the clinic has received the results. If you do not hear from us within 7 days, please contact the clinic through MyChart or phone. If you have a critical or abnormal lab result, we will notify you by phone as soon as possible.  Submit refill requests through [employer] or call your pharmacy and they will forward the refill request to us. Please allow 3 business days for your refill to be completed.          Additional Information About Your Visit        Care EveryWhere ID     This is your Care EveryWhere ID. This could be used by other organizations to access your [facility] medical records  [id number]        Your Vitals Were     Pulse Temperature Respirations Height Pulse Oximetry BMI (Body Mass Index)    70 97.8  F (36.6  C) (Oral) 18 5' 3\" (1.6 m) 96% 28.87 kg/m2       Blood Pressure from Last 3 Encounters:   12/03/18 105/66   08/27/18 120/64   08/15/18 112/70    Weight from Last 3 Encounters:   12/03/18 163 lb (73.9 kg)   11/27/18 160 lb 9.6 oz (72.8 kg)   08/15/18 171 lb 3.2 oz (77.7 kg)              We Performed the Following     Basic Metabolic Panel     CBC W PLT No Diff     EKG 12-LEAD CLIN[facility] READ     Hemoglobin A1c     Lipid Panel        Primary Care Provider Office Phone # Fax #    Dipak Welsh [phone]       [address] [facility] [address]        Equal Access to Services     [facility]: Hadii aad shila hadasho Soomaali, waaxda luqadaha, qaybta kaalmada charley hewitt . So [facility] [phone].    ATENCIÓN: Si habla español, tiene a villagomez disposición servicios gratuitos de asistencia lingüística. Llame al [phone].    We comply with applicable federal civil rights laws and [place] laws. We do not discriminate on the basis of race, color, national origin, age, disability, " sex, sexual orientation, or gender identity.            Thank you!     Thank you for [facility]  [facility]. Our goal is always to provide you with excellent care. Hearing back from our patients is one way we can continue to improve our services. Please take a few minutes to complete the written survey that you may receive in the mail after your visit with us. Thank you!             Your Updated Medication List - Protect others around you: Learn how to safely use, store and throw away your medicines at www.disposemymeds.org.          This list is accurate as of 12/3/18 11:59 PM.  Always use your most recent med list.                   Brand Name Dispense Instructions for use Diagnosis    albuterol 108 (90 Base) MCG/ACT inhaler    PROAIR HFA/PROVENTIL HFA/VENTOLIN HFA    1 Inhaler    Inhale 2 puffs into the lungs every 4 hours as needed    Mild persistent asthma without complication       amitriptyline 10 MG tablet    ELAVIL    90 tablet    Start at 1 tab at bedtime for 3 days, then 2 tabs at bedtime for 3 days, then 3 tabs at bedtime.    Degenerative disc disease at L5-S1 level       atorvastatin 40 MG tablet    LIPITOR    90 tablet    Take 1 tablet (40 mg) by mouth At Bedtime    Dyslipidemia       famotidine 20 MG tablet    PEPCID    90 tablet    Take 1 tablet (20 mg) by mouth daily    Gastroesophageal reflux disease, esophagitis presence not specified       gabapentin 400 MG capsule    NEURONTIN    270 capsule    Take 1 capsule (400 mg) by mouth 3 times daily    Degenerative disc disease at L5-S1 level       ibuprofen 800 MG tablet    ADVIL/MOTRIN    270 tablet    TAKE 1 TABLET BY MOUTH THREE TIMES DAILY AS NEEDED    Essential hypertension       lisinopril 10 MG tablet    PRINIVIL/ZESTRIL    90 tablet    Take 1 tablet (10 mg) by mouth daily    Essential hypertension       metFORMIN 500 MG tablet    GLUCOPHAGE    90 tablet    Take 1 tablet (500 mg) by mouth daily (with breakfast) Once  daily with a meal    Controlled type 2 diabetes mellitus without complication, without long-term current use of insulin (H)       PARoxetine 10 MG tablet    PAXIL    90 tablet    TAKE 1 TABLET BY MOUTH EVERY MORNING    Anxiety state

## 2018-12-03 NOTE — PROGRESS NOTES
[facility]  [address]  [place] [address]  [phone]    PRE-OP EVALUATION:  Today's date: 12/3/2018    Alejandra Villegas (: 1965) presents for pre-operative evaluation assessment as requested by  ***.  She requires evaluation and anesthesia risk assessment prior to undergoing surgery/procedure for treatment of *** .    {PREOP QUESTIONNAIRE OPTIONS (by MA):421331}    HPI:     HPI related to upcoming procedure: ***      {Ch. Problems:107432}    MEDICAL HISTORY:     Patient Active Problem List    Diagnosis Date Noted     Anxiety state 2018     Priority: Medium     Other isolated or specific phobias 2018     Priority: Medium     Degenerative disc disease at L5-S1 level 2018     Priority: Medium     Dyslipidemia 2018     Priority: Medium     Overview:   low HDL       Hidradenitis suppurativa 2018     Priority: Medium     Mild persistent asthma without complication 2018     Priority: Medium     Overview:   versus chronic obstructive pulmonary disease       Obesity 2018     Priority: Medium     Tobacco use disorder 2018     Priority: Medium     Gastroesophageal reflux disease 2017     Priority: Medium     Pain management contract broken 2015     Priority: Medium     Overview:   Contract violation - see 12/1/15 letter.       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     Priority: Medium     Seasonal allergic rhinitis 09/27/2011     Priority: Medium     Symptomatic menopausal or female climacteric states 09/21/2010     Priority: Medium     Abdominal pain, left lower quadrant 08/06/2010     Priority: Medium     Other diseases of lung, not elsewhere classified 08/01/2007     Priority: Medium     Diabetes mellitus type 2, controlled, without complications (H) 07/01/2007     Priority: Medium      Past Medical History:   Diagnosis Date     Allergic rhinitis 09/27/2011          Disorder of kidney and ureter 08/08/2008    Kidney disease GFR 87     Dorsalgia     No Comments Provided     Excessive and frequent menstruation with regular cycle     Menorrhagia     Generalized anxiety disorder     No Comments Provided     Hidradenitis suppurativa     No Comments Provided     Hyperlipidemia     No Comments Provided     Obesity 07/01/2007    Obesity  Body-mass index over 30     Other disorders of lung (CODE) 08/01/2007    Pulmonary nodules, stable on follow-up chest CT 12/08.  No further imaging recommended.     Other specified disorders of Eustachian tube, unspecified ear 02/27/2012          Other specified phobia     No Comments Provided     Personal history of other medical treatment (CODE)     Childbirth x4     Rheumatoid arthritis (H) 02/27/2012          Tobacco use     No Comments Provided     Type 2 diabetes mellitus without complications (H) 07/01/2007          Uncomplicated asthma     No Comments Provided     Past Surgical History:   Procedure Laterality Date     ARTHROSCOPY KNEE  05/2017    Dr Torres     ARTHROSCOPY KNEE  07/20/2017    Dr Garza, lateral release, meniscal repair     ARTHROTOMY WRIST Left 2011    Dr. Torres     CHOLECYSTECTOMY  06/2007    Emergency tracheotomy following failed intubation for laparoscopic cholecystectomy.     DILATION AND CURETTAGE  09/2006          HERNIA REPAIR  2007    Incisoinal hernia repair      HYSTERECTOMY TOTAL ABDOMINAL  02/2010          INTERSTIM DEVICE STAGE 2  01/06/2014    Dr. Pb GIRON     LAPAROSCOPIC ABLATION ENDOMETRIOSIS  09/2006          OOPHORECTOMY Left 2010     Dr Thorpe     RELEASE CARPAL TUNNEL  02/02/2017          SALPINGO OOPHORECTOMY,R/L/KELSEY Right 06/1999    Right salpingo-oophorectomy for hemorrhagic corpus luteum cyst     TRACHEOSTOMY  2007    emergency following failed intubation during cholecystectomy     TYMPANOSTOMY, LOCAL/TOPICAL ANESTHESIA  08/2006    PE tube placement     Current Outpatient Prescriptions   Medication Sig Dispense Refill     albuterol (PROAIR HFA/PROVENTIL HFA/VENTOLIN HFA) 108 (90 Base) MCG/ACT Inhaler Inhale 2 puffs into the lungs every 4 hours as needed 1 Inhaler 11     amitriptyline (ELAVIL) 10 MG tablet Start at 1 tab at bedtime for 3 days, then 2 tabs at bedtime for 3 days, then 3 tabs at bedtime. 90 tablet 11     atorvastatin (LIPITOR) 40 MG tablet Take 1 tablet (40 mg) by mouth At Bedtime 90 tablet 4     famotidine (PEPCID) 20 MG tablet Take 1 tablet (20 mg) by mouth daily 90 tablet 3     gabapentin (NEURONTIN) 400 MG capsule Take 1 capsule (400 mg) by mouth 3 times daily 270 capsule 4     ibuprofen (ADVIL/MOTRIN) 800 MG tablet TAKE 1 TABLET BY MOUTH THREE TIMES DAILY AS NEEDED 270 tablet 3     lisinopril (PRINIVIL/ZESTRIL) 10 MG tablet Take 1 tablet (10 mg) by mouth daily 90 tablet 1     metFORMIN (GLUCOPHAGE) 500 MG tablet Take 1 tablet (500 mg) by mouth daily (with breakfast) Once daily with a meal 90 tablet 4     oxyCODONE-acetaminophen (PERCOCET) 5-325 MG per tablet Take 1-2 tablets by mouth every 6 hours as needed for pain (Every 4-6 hours as needed.) 20 tablet 0     PARoxetine (PAXIL) 10 MG tablet TAKE 1 TABLET BY MOUTH EVERY MORNING 90 tablet 2     OTC products: {OTC ANALGESICS:542986}    Allergies   Allergen Reactions     Adhesive Tape      Bee Pollen Swelling     Seasonal     Codeine      Other reaction(s): Headache  Tylenol #3      "Folic Acid Itching     Pollen Extract      Seasonal     Amoxicillin Rash     Liquid Adhesive Rash     Nabumetone GI Disturbance     GI upset      Latex Allergy: {YES/NO WITH DEFAULT:229519::\"NO\"}    Social History   Substance Use Topics     Smoking status: Current Every Day Smoker     Packs/day: 0.50     Years: 32.00     Types: Cigarettes     Smokeless tobacco: Never Used     Alcohol use No     History   Drug Use No     Comment: Drug use: No       REVIEW OF SYSTEMS:   {ROS Preop Choices:366884}    EXAM:   There were no vitals taken for this visit.  {EXAM Preop Choices:687086}    DIAGNOSTICS:   {DIAGNOSTIC FOR PREOP:153471}    Recent Labs   Lab Test  05/11/18   1422  09/17/17   2114  09/17/17   2055   08/16/17   1606   HGB   --    --   14.2   --   15.3   PLT   --    --   338   --   319   NA  137  138   --    < >   --    POTASSIUM  3.7  3.5   --    < >   --    CR  0.72  0.72   --    < >   --    A1C  5.8   --    --    --    --     < > = values in this interval not displayed.        IMPRESSION:   {PREOP REASONS:233524::\"Reason for surgery/procedure: ***\",\"Diagnosis/reason for consult: ***\"}    The proposed surgical procedure is considered {HIGH=major cardiovascular or procedures requiring prolonged anesthesia >4 hours or large fluid shifts;    INTERMEDIATE=abdominal, most orthopedic and intrathoracic surgery; LOW= endoscopy, cataract and breast surgery:840647} risk.    REVISED CARDIAC RISK INDEX  The patient has the following serious cardiovascular risks for perioperative complications such as (MI, PE, VFib and 3  AV Block):  {PREOP REVISED CARDIAC INDEX (RCI):285279:p:\"No serious cardiac risks\"}  INTERPRETATION: {REVISED CARDIAC RISK INTERPRETATION:224493}    The patient has the following additional risks for perioperative complications:  {Additional perioperative risks:566110:p:\"No identified additional risks\"}      ICD-10-CM    1. Preop general physical exam Z01.818        RECOMMENDATIONS:     {IMPORTANT - Conditions " "- complete carefully!!:189592}    {IMPORTANT - Medications:620445::\"--Patient is to take all scheduled medications on the day of surgery EXCEPT for modifications listed below.\"}    {IMPORTANT - Approval:896714:p:\"APPROVAL GIVEN to proceed with proposed procedure, without further diagnostic evaluation\"}       Signed Electronically by: Dipak Welsh MD    Copy of this evaluation report is provided to requesting physician.    [facility] Preop Guidelines    Revised Cardiac Risk Index  "

## 2018-12-10 ENCOUNTER — ANESTHESIA EVENT (OUTPATIENT)
Dept: SURGERY | Facility: OTHER | Age: 53
End: 2018-12-10
Payer: MEDICARE

## 2018-12-11 ENCOUNTER — ANESTHESIA (OUTPATIENT)
Dept: SURGERY | Facility: OTHER | Age: 53
End: 2018-12-11
Payer: MEDICARE

## 2018-12-11 ENCOUNTER — HOSPITAL ENCOUNTER (OUTPATIENT)
Facility: OTHER | Age: 53
Discharge: HOME | End: 2018-12-11
Attending: UROLOGY | Admitting: UROLOGY
Payer: MEDICARE

## 2018-12-11 VITALS
WEIGHT: 160 LBS | BODY MASS INDEX: 28.34 KG/M2 | TEMPERATURE: 97.7 F | DIASTOLIC BLOOD PRESSURE: 76 MMHG | OXYGEN SATURATION: 94 % | SYSTOLIC BLOOD PRESSURE: 116 MMHG | RESPIRATION RATE: 14 BRPM

## 2018-12-11 DIAGNOSIS — N39.41 URGE INCONTINENCE: Primary | ICD-10-CM

## 2018-12-11 PROCEDURE — 37000009 ZZH ANESTHESIA TECHNICAL FEE, EACH ADDTL 15 MIN: Performed by: UROLOGY

## 2018-12-11 PROCEDURE — 25000125 ZZHC RX 250: Performed by: NURSE ANESTHETIST, CERTIFIED REGISTERED

## 2018-12-11 PROCEDURE — 27810325 ZZHC OR IMPLANT OTHER OPNP: Performed by: UROLOGY

## 2018-12-11 PROCEDURE — 27211024 ZZHC OR SUPPLY OTHER OPNP: Performed by: UROLOGY

## 2018-12-11 PROCEDURE — 25000128 H RX IP 250 OP 636: Performed by: UROLOGY

## 2018-12-11 PROCEDURE — 64590 INS/RPL PRPH SAC/GSTR NPG/R: CPT | Performed by: NURSE ANESTHETIST, CERTIFIED REGISTERED

## 2018-12-11 PROCEDURE — 40000306 ZZH STATISTIC PRE PROC ASSESS II: Performed by: UROLOGY

## 2018-12-11 PROCEDURE — 36000063 ZZH SURGERY LEVEL 4 EA 15 ADDTL MIN: Performed by: UROLOGY

## 2018-12-11 PROCEDURE — 64590 INS/RPL PRPH SAC/GSTR NPG/R: CPT | Performed by: UROLOGY

## 2018-12-11 PROCEDURE — 36000065 ZZH SURGERY LEVEL 4 W FLUORO 1ST 30 MIN: Performed by: UROLOGY

## 2018-12-11 PROCEDURE — C1883 ADAPT/EXT, PACING/NEURO LEAD: HCPCS | Performed by: UROLOGY

## 2018-12-11 PROCEDURE — 71000027 ZZH RECOVERY PHASE 2 EACH 15 MINS: Performed by: UROLOGY

## 2018-12-11 PROCEDURE — 25000128 H RX IP 250 OP 636: Performed by: NURSE ANESTHETIST, CERTIFIED REGISTERED

## 2018-12-11 PROCEDURE — 37000008 ZZH ANESTHESIA TECHNICAL FEE, 1ST 30 MIN: Performed by: UROLOGY

## 2018-12-11 PROCEDURE — 27210794 ZZH OR GENERAL SUPPLY STERILE: Performed by: UROLOGY

## 2018-12-11 PROCEDURE — C1787 PATIENT PROGR, NEUROSTIM: HCPCS | Performed by: UROLOGY

## 2018-12-11 PROCEDURE — 94640 AIRWAY INHALATION TREATMENT: CPT

## 2018-12-11 PROCEDURE — 25000125 ZZHC RX 250: Performed by: UROLOGY

## 2018-12-11 DEVICE — IMPLANTABLE DEVICE: Type: IMPLANTABLE DEVICE | Site: BACK | Status: FUNCTIONAL

## 2018-12-11 RX ORDER — NALOXONE HYDROCHLORIDE 0.4 MG/ML
.1-.4 INJECTION, SOLUTION INTRAMUSCULAR; INTRAVENOUS; SUBCUTANEOUS
Status: DISCONTINUED | OUTPATIENT
Start: 2018-12-11 | End: 2018-12-11 | Stop reason: HOSPADM

## 2018-12-11 RX ORDER — FENTANYL CITRATE 50 UG/ML
25-50 INJECTION, SOLUTION INTRAMUSCULAR; INTRAVENOUS
Status: DISCONTINUED | OUTPATIENT
Start: 2018-12-11 | End: 2018-12-11 | Stop reason: HOSPADM

## 2018-12-11 RX ORDER — IPRATROPIUM BROMIDE AND ALBUTEROL SULFATE 2.5; .5 MG/3ML; MG/3ML
3 SOLUTION RESPIRATORY (INHALATION) ONCE
Status: COMPLETED | OUTPATIENT
Start: 2018-12-11 | End: 2018-12-11

## 2018-12-11 RX ORDER — CLINDAMYCIN PHOSPHATE 900 MG/50ML
900 INJECTION, SOLUTION INTRAVENOUS
Status: COMPLETED | OUTPATIENT
Start: 2018-12-11 | End: 2018-12-11

## 2018-12-11 RX ORDER — PROPOFOL 10 MG/ML
INJECTION, EMULSION INTRAVENOUS CONTINUOUS PRN
Status: DISCONTINUED | OUTPATIENT
Start: 2018-12-11 | End: 2018-12-11

## 2018-12-11 RX ORDER — SULFAMETHOXAZOLE/TRIMETHOPRIM 800-160 MG
1 TABLET ORAL 2 TIMES DAILY
Qty: 6 TABLET | Refills: 0 | Status: SHIPPED | OUTPATIENT
Start: 2018-12-11 | End: 2019-04-20

## 2018-12-11 RX ORDER — SODIUM CHLORIDE 9 MG/ML
INJECTION, SOLUTION INTRAVENOUS CONTINUOUS
Status: DISCONTINUED | OUTPATIENT
Start: 2018-12-11 | End: 2018-12-11 | Stop reason: HOSPADM

## 2018-12-11 RX ORDER — ONDANSETRON 2 MG/ML
4 INJECTION INTRAMUSCULAR; INTRAVENOUS EVERY 30 MIN PRN
Status: DISCONTINUED | OUTPATIENT
Start: 2018-12-11 | End: 2018-12-11 | Stop reason: HOSPADM

## 2018-12-11 RX ORDER — FENTANYL CITRATE 50 UG/ML
INJECTION, SOLUTION INTRAMUSCULAR; INTRAVENOUS PRN
Status: DISCONTINUED | OUTPATIENT
Start: 2018-12-11 | End: 2018-12-11

## 2018-12-11 RX ORDER — ONDANSETRON 4 MG/1
4 TABLET, ORALLY DISINTEGRATING ORAL EVERY 30 MIN PRN
Status: DISCONTINUED | OUTPATIENT
Start: 2018-12-11 | End: 2018-12-11 | Stop reason: HOSPADM

## 2018-12-11 RX ORDER — PROPOFOL 10 MG/ML
INJECTION, EMULSION INTRAVENOUS PRN
Status: DISCONTINUED | OUTPATIENT
Start: 2018-12-11 | End: 2018-12-11

## 2018-12-11 RX ORDER — ONDANSETRON 2 MG/ML
INJECTION INTRAMUSCULAR; INTRAVENOUS PRN
Status: DISCONTINUED | OUTPATIENT
Start: 2018-12-11 | End: 2018-12-11

## 2018-12-11 RX ORDER — GENTAMICIN SULFATE 60 MG/50ML
120 INJECTION, SOLUTION INTRAVENOUS
Status: COMPLETED | OUTPATIENT
Start: 2018-12-11 | End: 2018-12-11

## 2018-12-11 RX ORDER — LIDOCAINE HYDROCHLORIDE AND EPINEPHRINE 10; 10 MG/ML; UG/ML
INJECTION, SOLUTION INFILTRATION; PERINEURAL PRN
Status: DISCONTINUED | OUTPATIENT
Start: 2018-12-11 | End: 2018-12-11 | Stop reason: HOSPADM

## 2018-12-11 RX ORDER — LIDOCAINE HYDROCHLORIDE 20 MG/ML
INJECTION, SOLUTION INFILTRATION; PERINEURAL PRN
Status: DISCONTINUED | OUTPATIENT
Start: 2018-12-11 | End: 2018-12-11

## 2018-12-11 RX ORDER — LIDOCAINE 40 MG/G
CREAM TOPICAL
Status: DISCONTINUED | OUTPATIENT
Start: 2018-12-11 | End: 2018-12-11 | Stop reason: HOSPADM

## 2018-12-11 RX ADMIN — FENTANYL CITRATE 25 MCG: 50 INJECTION, SOLUTION INTRAMUSCULAR; INTRAVENOUS at 08:22

## 2018-12-11 RX ADMIN — PROPOFOL 140 MCG/KG/MIN: 10 INJECTION, EMULSION INTRAVENOUS at 08:10

## 2018-12-11 RX ADMIN — IPRATROPIUM BROMIDE AND ALBUTEROL SULFATE 3 ML: .5; 3 SOLUTION RESPIRATORY (INHALATION) at 07:48

## 2018-12-11 RX ADMIN — SODIUM CHLORIDE: 900 INJECTION, SOLUTION INTRAVENOUS at 07:43

## 2018-12-11 RX ADMIN — PROPOFOL 50 MG: 10 INJECTION, EMULSION INTRAVENOUS at 08:22

## 2018-12-11 RX ADMIN — CLINDAMYCIN PHOSPHATE 900 MG: 900 INJECTION, SOLUTION INTRAVENOUS at 07:44

## 2018-12-11 RX ADMIN — LIDOCAINE HYDROCHLORIDE 1 ML: 10 INJECTION, SOLUTION EPIDURAL; INFILTRATION; INTRACAUDAL; PERINEURAL at 07:43

## 2018-12-11 RX ADMIN — FENTANYL CITRATE 50 MCG: 50 INJECTION, SOLUTION INTRAMUSCULAR; INTRAVENOUS at 08:44

## 2018-12-11 RX ADMIN — LIDOCAINE HYDROCHLORIDE 60 MG: 20 INJECTION, SOLUTION INFILTRATION; PERINEURAL at 08:10

## 2018-12-11 RX ADMIN — PROPOFOL 60 MG: 10 INJECTION, EMULSION INTRAVENOUS at 08:10

## 2018-12-11 RX ADMIN — ONDANSETRON 4 MG: 2 INJECTION INTRAMUSCULAR; INTRAVENOUS at 08:10

## 2018-12-11 RX ADMIN — FENTANYL CITRATE 25 MCG: 50 INJECTION, SOLUTION INTRAMUSCULAR; INTRAVENOUS at 08:10

## 2018-12-11 RX ADMIN — GENTAMICIN SULFATE 120 MG: 60 INJECTION, SOLUTION INTRAVENOUS at 08:12

## 2018-12-11 ASSESSMENT — LIFESTYLE VARIABLES: TOBACCO_USE: 1

## 2018-12-11 NOTE — ANESTHESIA CARE TRANSFER NOTE
Patient: Alejandra Villegas    Procedure(s):  Interstim Battery Replacement    Diagnosis: urge incontinence  Diagnosis Additional Information: No value filed.    Anesthesia Type:   MAC     Note:  Airway :Nasal Cannula  Patient transferred to:Phase II  Handoff Report: Identifed the Patient, Identified the Reponsible Provider, Reviewed the pertinent medical history, Discussed the surgical course, Reviewed Intra-OP anesthesia mangement and issues during anesthesia, Set expectations for post-procedure period and Allowed opportunity for questions and acknowledgement of understanding      Vitals: (Last set prior to Anesthesia Care Transfer)    CRNA VITALS  12/11/2018 0820 - 12/11/2018 0857      12/11/2018             Resp Rate (set):  10                Electronically Signed By: AMARILIS Ward CRNA  December 11, 2018  8:57 AM

## 2018-12-11 NOTE — ANESTHESIA PREPROCEDURE EVALUATION
Anesthesia Pre-Procedure Evaluation    Patient: Alejandra Villegas   MRN: [id number] : 1965          Preoperative Diagnosis: urge incontinence    Procedure(s):  Interstim Battery Replacement    Past Medical History:   Diagnosis Date     Allergic rhinitis 2011          Disorder of kidney and ureter 2008    Kidney disease GFR 87     Dorsalgia     No Comments Provided     Excessive and frequent menstruation with regular cycle     Menorrhagia     Generalized anxiety disorder     No Comments Provided     Hidradenitis suppurativa     No Comments Provided     Hyperlipidemia     No Comments Provided     Obesity 2007    Obesity  Body-mass index over 30     Other disorders of lung (CODE) 2007    Pulmonary nodules, stable on follow-up chest CT .  No further imaging recommended.     Other specified disorders of Eustachian tube, unspecified ear 2012          Other specified phobia     No Comments Provided     Personal history of other medical treatment (CODE)     Childbirth x4     Rheumatoid arthritis (H) 2012          Tobacco use     No Comments Provided     Type 2 diabetes mellitus without complications (H) 2007          Uncomplicated asthma     No Comments Provided     Past Surgical History:   Procedure Laterality Date     ARTHROSCOPY KNEE  2017    Dr Torres     ARTHROSCOPY KNEE  2017    Dr Garza, lateral release, meniscal repair     ARTHROTOMY WRIST Left     Dr. Torres     CHOLECYSTECTOMY  2007    Emergency tracheotomy following failed intubation for laparoscopic cholecystectomy.     DILATION AND CURETTAGE  2006          HERNIA REPAIR  2007    Incisoinal hernia repair     HYSTERECTOMY TOTAL ABDOMINAL  2010          INTERSTIM DEVICE STAGE 2  2014    Dr. Pb GIRON     LAPAROSCOPIC ABLATION ENDOMETRIOSIS  2006          OOPHORECTOMY Left      Dr Thorpe     RELEASE CARPAL TUNNEL  2017          SALPINGO OOPHORECTOMY,R/L/KELSEY Right  06/1999    Right salpingo-oophorectomy for hemorrhagic corpus luteum cyst     TRACHEOSTOMY  2007    emergency following failed intubation during cholecystectomy     TYMPANOSTOMY, LOCAL/TOPICAL ANESTHESIA  08/2006    PE tube placement       Anesthesia Evaluation     . Pt has had prior anesthetic.     No history of anesthetic complications          ROS/MED HX    ENT/Pulmonary:     (+)tobacco use, Current use 0.75 packs/day  Mild Persistent asthma Treatment: Inhaler prn,  , . .    Neurologic:  - neg neurologic ROS     Cardiovascular:     (+) hypertension----. : . . . :. .       METS/Exercise Tolerance:  4 - Raking leaves, gardening   Hematologic:         Musculoskeletal:         GI/Hepatic:     (+) GERD Asymptomatic on medication,       Renal/Genitourinary:         Endo:     (+) type II DM Obesity, .      Psychiatric:  - neg psychiatric ROS       Infectious Disease:  - neg infectious disease ROS       Malignancy:      - no malignancy   Other:    - neg other ROS                      Physical Exam      Airway   Mallampati: IV  TM distance: >3 FB  Neck ROM: full    Dental   (+) upper dentures    Cardiovascular   Rhythm and rate: regular and normal      Pulmonary (+) wheezes               Lab Results   Component Value Date    WBC 8.1 12/03/2018    HGB 14.1 12/03/2018    HCT 42.0 12/03/2018     12/03/2018     12/03/2018    POTASSIUM 3.7 12/03/2018    CHLORIDE 100 12/03/2018    CO2 29 12/03/2018    BUN 8 12/03/2018    CR 0.78 12/03/2018    GLC 96 12/03/2018    TOBIN 9.5 12/03/2018    ALBUMIN 3.8 09/17/2017    PROTTOTAL 6.9 09/17/2017    ALKPHOS 76 09/17/2017    BILITOTAL 0.5 09/17/2017       Preop Vitals  BP Readings from Last 3 Encounters:   12/03/18 105/66   08/27/18 120/64   08/15/18 112/70    Pulse Readings from Last 3 Encounters:   12/03/18 70   11/27/18 88   08/27/18 81      Resp Readings from Last 3 Encounters:   12/03/18 18   11/27/18 16   08/27/18 20    SpO2 Readings from Last 3 Encounters:   12/03/18 96%  "  08/27/18 97%   01/18/17 93%      Temp Readings from Last 1 Encounters:   12/03/18 97.8  F (36.6  C) (Oral)    Ht Readings from Last 1 Encounters:   12/03/18 1.6 m (5' 3\")      Wt Readings from Last 1 Encounters:   12/03/18 73.9 kg (163 lb)    Estimated body mass index is 28.87 kg/m  as calculated from the following:    Height as of 12/3/18: 1.6 m (5' 3\").    Weight as of 12/3/18: 73.9 kg (163 lb).       Anesthesia Plan      History & Physical Review      ASA Status:  3 .    NPO Status:  > 6 hours    Plan for MAC          Postoperative Care      Consents  Anesthetic plan, risks, benefits and alternatives discussed with:  Patient..                 AMARILIS Ward CRNA  "

## 2018-12-11 NOTE — OR NURSING
Pt. Had emesis, moved to cart for respiratory support.  Incision closed with pt. In lateral position on cart.

## 2018-12-11 NOTE — OR NURSING
Patient has been discharged to home at 0935 via ambulatory accompanied by daughter    Written discharge instructions were provided to patient.  Prescriptions were sent to patients pharmacy.      Patient and adult caring for them verbalize understanding of discharge instructions including no driving until tomorrow and no longer taking narcotic pain medications - no operating mechanical equipment and no making any important decisions.They understand reason for discharge, and necessary follow-up appointments.    Margarita Greenwood RN

## 2018-12-11 NOTE — ANESTHESIA POSTPROCEDURE EVALUATION
Patient: Alejandra Villegas    Procedure(s):  Interstim Battery Replacement    Diagnosis:urge incontinence  Diagnosis Additional Information: No value filed.    Anesthesia Type:  MAC    Note:  Anesthesia Post Evaluation    Patient location during evaluation: Phase 2  Patient participation: Able to fully participate in evaluation  Level of consciousness: awake and alert  Pain management: adequate  Airway patency: patent  Cardiovascular status: blood pressure returned to baseline, acceptable and hemodynamically stable  Respiratory status: acceptable  Hydration status: acceptable  PONV: none     Anesthetic complications: None          Last vitals:  Vitals:    12/11/18 0720 12/11/18 0748 12/11/18 0854   BP: 107/68  110/65   Resp: 16 16 14   Temp: 96.8  F (36  C)  97.7  F (36.5  C)   SpO2: 92% 92%          Electronically Signed By: AMARILIS Ward CRNA  December 11, 2018  9:04 AM

## 2018-12-11 NOTE — OP NOTE
Preprocedure diagnosis  Urge Incontinence    Postprocedure diagnosis  Urge Incontinece    Procedure  Interstim generator replacement    Surgeon  Rl Ambriz MD    Surgeon(s)/Proceduralist(s) and Assistants (if any)  Surgeon(s):  Rl Ambriz MD  Circulator: Elizabeth Cervantes RN  Scrub Person: Lori Alicia  2nd Scrub: Shaye Lima; Mey Abarca RN  Pre-Op Nurse: Hank Sorenson RN    Specimen(s)  No    (EBL) Estimated blood loss (ml)  <5    Anesthesia  MAC    Complications  Emesis during MAC sedation prompted repositioning to minimize risk for aspiration    Indications  Previously placed Interstim battery needed replacement      Procedure  The patient was taken to the operating room and placed prone on the operating table.  Pre-operative antibiotics were administered.   Induction of sedation was performed followed by a time-out was performed.  Patient was prepped and draped and an Ioban was placed.    Lidocaine 2% with epinephrine was instilled subcutaneously over the previous incision overlying the palpable battery.      A lateral pocket was made with a scalpel and blunt dissection inferior to the iliac crest.      Patient started vomiting so the case was aborted.  An antibiotic soaked gauze was placed in the pocket and the patient was repositioned right lateral decubitus to assure patent airway and minimize aspiration risk.    The pocket was reprepped and draped.  The gauze was removed.  The pocket was irrigated with 1 liter of irrigant solution (1 gram Ancef and 1 Liter).    The generator was placed in the pocket with the correct side facing up.    The skin was closed in two layers with 3-0 Vicryl and Dermabond.  The impedence was tested and within acceptable limits.      Plan  Follow up in 2 weeks for wound check

## 2018-12-11 NOTE — DISCHARGE INSTRUCTIONS
[facility]   Adult Discharge Orders & Instructions     For 24 hours after surgery    1. Get plenty of rest.  A responsible adult must stay with you for at least 24 hours after you leave the hospital.   2. Do not drive or use heavy equipment.  If you have weakness or tingling, don't drive or use heavy equipment until this feeling goes away.  3. Do not drink alcohol.  4. Avoid strenuous or risky activities.  Ask for help when climbing stairs.   5. You may feel lightheaded.  IF so, sit for a few minutes before standing.  Have someone help you get up.   6. If you have nausea (feel sick to your stomach): Drink only clear liquids such as apple juice, ginger ale, broth or 7-Up.  Rest may also help.  Be sure to drink enough fluids.  Move to a regular diet as you feel able.  7. You may have a slight fever. Call the doctor if your fever is over 101 F (38.3 C) (taken under the tongue) or lasts longer than 24 hours.  8. You may have a dry mouth, a sore throat, muscle aches or trouble sleeping.  These should go away after 24 hours.  9. Do not make important or legal decisions.   Call your doctor for any of the followin.  Signs of infection (fever, growing tenderness at the surgery site, a large amount of drainage or bleeding, severe pain, foul-smelling drainage, redness, swelling).    2. It has been over 8 to 10 hours since surgery and you are still not able to urinate (pass water).    3.  Headache for over 24 hours.    4.  Numbness, tingling or weakness the day after surgery (if you had spinal anesthesia).  To contact a doctor, call    [phone]___________________________

## 2018-12-31 ENCOUNTER — OFFICE VISIT (OUTPATIENT)
Dept: UROLOGY | Facility: OTHER | Age: 53
End: 2018-12-31
Attending: UROLOGY
Payer: MEDICARE

## 2018-12-31 VITALS
DIASTOLIC BLOOD PRESSURE: 62 MMHG | BODY MASS INDEX: 29.26 KG/M2 | WEIGHT: 165.2 LBS | SYSTOLIC BLOOD PRESSURE: 110 MMHG | RESPIRATION RATE: 16 BRPM

## 2018-12-31 DIAGNOSIS — N39.41 URGE INCONTINENCE: Primary | ICD-10-CM

## 2018-12-31 PROCEDURE — 99207 ZZC NO CHARGE LOS: CPT

## 2018-12-31 PROCEDURE — G0463 HOSPITAL OUTPT CLINIC VISIT: HCPCS

## 2018-12-31 ASSESSMENT — PAIN SCALES - GENERAL: PAINLEVEL: SEVERE PAIN (6)

## 2018-12-31 NOTE — NURSING NOTE
Pt presents to clinic for a 2 week post op check, had interstem battery replaced    Review of Systems:    Weight loss:    No     Recent fever/chills:  No   Night sweats:   No  Current skin rash:  No   Recent hair loss:  No  Heat intolerance:  No   Cold intolerance:  No  Chest pain:   No   Palpitations:   No  Shortness of breath:  No   Wheezing:   No  Constipation:    No   Diarrhea:   No   Nausea:   No   Vomiting:   No   Kidney/side pain:  No   Back pain:   Yes  Frequent headaches:  No   Dizziness:     No  Leg swelling:   No   Calf pain:    No

## 2019-03-05 DIAGNOSIS — I10 ESSENTIAL HYPERTENSION: ICD-10-CM

## 2019-03-07 RX ORDER — LISINOPRIL 10 MG/1
TABLET ORAL
Qty: 90 TABLET | Refills: 2 | Status: SHIPPED | OUTPATIENT
Start: 2019-03-07 | End: 2020-03-24

## 2019-03-07 NOTE — TELEPHONE ENCOUNTER
Chart review shows that LOV with PCP was on 12/3/18 for a preop. Rx as requested is noted in office visit notes on that date with no changes and no specific follow up timeline is noted as well. Writer will refill Rx as requested.    Prescription refilled per RN Medication Refill Policy..................Armando Gunn 3/7/2019 8:46 AM

## 2019-04-17 DIAGNOSIS — K21.9 GASTROESOPHAGEAL REFLUX DISEASE, ESOPHAGITIS PRESENCE NOT SPECIFIED: ICD-10-CM

## 2019-04-17 DIAGNOSIS — F41.1 ANXIETY STATE: ICD-10-CM

## 2019-04-19 RX ORDER — PAROXETINE 10 MG/1
TABLET, FILM COATED ORAL
Qty: 90 TABLET | Refills: 2 | Status: SHIPPED | OUTPATIENT
Start: 2019-04-19 | End: 2019-06-07

## 2019-04-19 RX ORDER — FAMOTIDINE 20 MG/1
TABLET, FILM COATED ORAL
Qty: 180 TABLET | Refills: 0 | OUTPATIENT
Start: 2019-04-19

## 2019-04-19 NOTE — TELEPHONE ENCOUNTER
Chart review shows that patient with active order for pepcid on file as noted below:    Outpatient Medication Detail      Disp Refills Start End SYMONE   famotidine (PEPCID) 20 MG tablet 90 tablet 3 11/9/2018  No   Sig - Route: Take 1 tablet (20 mg) by mouth daily - Oral   Sent to pharmacy as: famotidine (PEPCID) 20 MG tablet   Class: E-Prescribe   Order: [id number]   E-Prescribing Status: Receipt confirmed by pharmacy (11/9/2018  4:36 PM CST)   Printout Tracking     External Result Report   Pharmacy     [id number] - [place], [place] -  [address]     Review of chart shows that per refill encounter on 11/7/18, patient was contacted for clarification on dosing of pepcid and Rx as noted above was the reported dosing. Refill request from pharmacy is for the incorrect dose. Writer will refuse Rx request in this encounter and will make note of above in Rx refusal to pharmacy.    Unable to complete prescription refill per RN Medication Refill Policy. Armando Gunn 4/19/2019 12:59 PM

## 2019-04-19 NOTE — TELEPHONE ENCOUNTER
Chart review shows that LOV with PCP was on 12/3/18 for a preop. Rx as requested is noted in office visit notes on that date with no changes and writer does not note a specific follow up timeline as well. Writer will refill Rx as requested.    Prescription refilled per RN Medication Refill Policy..................Armando Gunn 4/19/2019 1:03 PM

## 2019-04-20 ENCOUNTER — OFFICE VISIT (OUTPATIENT)
Dept: FAMILY MEDICINE | Facility: OTHER | Age: 54
End: 2019-04-20
Attending: NURSE PRACTITIONER
Payer: MEDICARE

## 2019-04-20 VITALS
HEIGHT: 64 IN | OXYGEN SATURATION: 96 % | HEART RATE: 72 BPM | TEMPERATURE: 97.7 F | DIASTOLIC BLOOD PRESSURE: 68 MMHG | WEIGHT: 160.8 LBS | RESPIRATION RATE: 24 BRPM | SYSTOLIC BLOOD PRESSURE: 104 MMHG | BODY MASS INDEX: 27.45 KG/M2

## 2019-04-20 DIAGNOSIS — J40 BRONCHITIS: Primary | ICD-10-CM

## 2019-04-20 PROCEDURE — G0463 HOSPITAL OUTPT CLINIC VISIT: HCPCS

## 2019-04-20 PROCEDURE — 99214 OFFICE O/P EST MOD 30 MIN: CPT | Performed by: NURSE PRACTITIONER

## 2019-04-20 RX ORDER — DOXYCYCLINE HYCLATE 100 MG
100 TABLET ORAL 2 TIMES DAILY
Qty: 14 TABLET | Refills: 0 | Status: SHIPPED | OUTPATIENT
Start: 2019-04-20 | End: 2019-06-07

## 2019-04-20 ASSESSMENT — ENCOUNTER SYMPTOMS
MUSCULOSKELETAL NEGATIVE: 1
EYES NEGATIVE: 1
COUGH: 1
RHINORRHEA: 1
GASTROINTESTINAL NEGATIVE: 1
SHORTNESS OF BREATH: 0
FEVER: 0
FATIGUE: 1
HEADACHES: 0
SORE THROAT: 1

## 2019-04-20 ASSESSMENT — MIFFLIN-ST. JEOR: SCORE: 1329.38

## 2019-04-20 NOTE — NURSING NOTE
Patient presents to the clinic for unproductive cough, sore throat and ear pain x 3 days. She has taken nyquil and ibuprofen for treatment.  Medication Reconciliation: complete    Belinda Negrete, CMA

## 2019-04-20 NOTE — PATIENT INSTRUCTIONS
Patient Education     Bronchitis, Antibiotic Treatment (Adult)    Bronchitis is an infection of the air passages (bronchial tubes) in your lungs. It often occurs when you have a cold. This illness is contagious during the first few days and is spread through the air by coughing and sneezing, or by direct contact (touching the sick person and then touching your own eyes, nose, or mouth).  Symptoms of bronchitis include cough with mucus (phlegm) and low-grade fever. Bronchitis usually lasts 7 to 14 days. Mild cases can be treated with simple home remedies. More severe infection is treated with an antibiotic.  Home care  Follow these guidelines when caring for yourself at home:    If your symptoms are severe, rest at home for the first 2 to 3 days. When you go back to your usual activities, don't let yourself get too tired.    Don't smoke. Also stay away from secondhand smoke.    You may use over-the-counter medicines to control fever or pain, unless another medicine was prescribed. If you have chronic liver or kidney disease or have ever had a stomach ulcer or gastrointestinal bleeding, talk with your healthcare provider before using these medicines. Also talk to your provider if you are taking medicine to prevent blood clots. Aspirin should never be given to anyone younger than 18 who is ill with a viral infection or fever. It may cause severe liver or brain damage.    Your appetite may be low, so a light diet is fine. Stay well hydrated by drinking 6 to 8 glasses of fluids per day. This includes water, soft drinks, sports drinks, juices, tea, or soup. Extra fluids will help loosen mucus in your nose and lungs.    Over-the-counter cough, cold, and sore-throat medicines will not shorten the length of the illness, but they may be helpful to reduce your symptoms. Don't use decongestants if you have high blood pressure.    Finish all antibiotic medicine. Do this even if you are feeling better after only a few  days.  Follow-up care  Follow up with your healthcare provider, or as advised. If you had an X-ray or ECG (electrocardiogram), a specialist will review it. You will be told of any new test results that may affect your care.  If you are age 65 or older, if you smoke, or if you have a chronic lung disease or condition that affects your immune system, ask your healthcare provider about getting a pneumococcal vaccine and a yearly flu shot (influenza vaccine).  When to seek medical advice  Call your healthcare provider right away if any of these occur:    Fever of 100.4 F (38 C) or higher, or as directed by your healthcare provider    Coughing up more sputum    Weakness, drowsiness, headache, facial pain, ear pain, or a stiff neck     Call 911  Call 911 if any of these occur.    Coughing up blood    Weakness, drowsiness, headache, or stiff neck that get worse    Trouble breathing, wheezing, or pain with breathing   Date Last Reviewed: 6/1/2018 2000-2018 [employer]. [address] [place] [address], [place] [address]. All rights reserved. This information is not intended as a substitute for professional medical care. Always follow your healthcare professional's instructions.

## 2019-04-20 NOTE — PROGRESS NOTES
SUBJECTIVE:   Alejandra Villegas is a 53 year old female who presents to clinic today for the following health issues:    HPI  Presents with 3-4 days of dry cough, sore throat and ear pain. No SOB. No fever.   Eating and drinking ok. Taking Nyquil and ibuprofen for symptoms.  Smokes 1 ppd. Hasn't used her inhaler. History of mild persistent asthma.    Patient Active Problem List    Diagnosis Date Noted     Anxiety state 01/30/2018     Priority: Medium     Other isolated or specific phobias 01/30/2018     Priority: Medium     Degenerative disc disease at L5-S1 level 01/30/2018     Priority: Medium     Dyslipidemia 01/30/2018     Priority: Medium     Overview:   low HDL       Hidradenitis suppurativa 01/30/2018     Priority: Medium     Mild persistent asthma without complication 01/30/2018     Priority: Medium     Overview:   versus chronic obstructive pulmonary disease       Obesity 01/30/2018     Priority: Medium     Tobacco use disorder 01/30/2018     Priority: Medium     Gastroesophageal reflux disease 01/18/2017     Priority: Medium     Pain management contract broken 06/05/2015     Priority: Medium     Overview:   Contract violation - see 12/1/15 letter.       HTN (hypertension) 08/25/2014     Priority: Medium     Urge incontinence 06/04/2014     Priority: Medium     Alopecia areata 02/21/2014     Priority: Medium     Pyelonephritis due to Escherichia coli 06/08/2013     Priority: Medium     Benign paroxysmal positional vertigo 02/28/2013     Priority: Medium     Ovarian cyst, left 05/09/2012     Priority: Medium     Other acquired deformity of ankle and foot(736.79) 05/09/2012     Priority: Medium     RA (rheumatoid arthritis) (H) 05/09/2012     Priority: Medium     Overview:   Diagnosed [place] 2012       Asthma 05/04/2012     Priority: Medium     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     Priority: Medium     Seasonal allergic rhinitis 09/27/2011     Priority: Medium     Symptomatic  menopausal or female climacteric states 09/21/2010     Priority: Medium     Abdominal pain, left lower quadrant 08/06/2010     Priority: Medium     Other diseases of lung, not elsewhere classified 08/01/2007     Priority: Medium     Diabetes mellitus type 2, controlled, without complications (H) 07/01/2007     Priority: Medium     Past Medical History:   Diagnosis Date     Allergic rhinitis 09/27/2011          Disorder of kidney and ureter 08/08/2008    Kidney disease GFR 87     Dorsalgia     No Comments Provided     Excessive and frequent menstruation with regular cycle     Menorrhagia     Generalized anxiety disorder     No Comments Provided     Hidradenitis suppurativa     No Comments Provided     Hyperlipidemia     No Comments Provided     Obesity 07/01/2007    Obesity  Body-mass index over 30     Other disorders of lung (CODE) 08/01/2007    Pulmonary nodules, stable on follow-up chest CT 12/08.  No further imaging recommended.     Other specified disorders of Eustachian tube, unspecified ear 02/27/2012          Other specified phobia     No Comments Provided     Personal history of other medical treatment (CODE)     Childbirth x4     Rheumatoid arthritis (H) 02/27/2012          Tobacco use     No Comments Provided     Type 2 diabetes mellitus without complications (H) 07/01/2007          Uncomplicated asthma     No Comments Provided      Past Surgical History:   Procedure Laterality Date     ARTHROSCOPY KNEE  05/2017    Dr Torres     ARTHROSCOPY KNEE  07/20/2017    Dr Garza, lateral release, meniscal repair     ARTHROTOMY WRIST Left 2011    Dr. Torres     CHOLECYSTECTOMY  06/2007    Emergency tracheotomy following failed intubation for laparoscopic cholecystectomy.     DILATION AND CURETTAGE  09/2006          HERNIA REPAIR  2007    Incisoinal hernia repair     HYSTERECTOMY TOTAL ABDOMINAL  02/2010          IMPLANT STIMULATOR AND LEADS SACRAL NERVE (STAGE ONE AND TWO) N/A 12/11/2018    Procedure: Interstim  Battery Replacement;  Surgeon: Rl Ambriz MD;  Location:  OR     INTERSTIM DEVICE STAGE 2  01/06/2014    Dr. Ambriz - [facility]     LAPAROSCOPIC ABLATION ENDOMETRIOSIS  09/2006          OOPHORECTOMY Left 2010     Dr Thorpe     RELEASE CARPAL TUNNEL  02/02/2017          SALPINGO OOPHORECTOMY,R/L/KELSEY Right 06/1999    Right salpingo-oophorectomy for hemorrhagic corpus luteum cyst     TRACHEOSTOMY  2007    emergency following failed intubation during cholecystectomy     TYMPANOSTOMY, LOCAL/TOPICAL ANESTHESIA  08/2006    PE tube placement     Family History   Problem Relation Age of Onset     Arthritis Mother         Arthritis,Rheumatoid     Cancer Mother         Cancer, lung and uterine cancer     Heart Disease Father         Heart Disease,CAD, CABG, peripheral vascular dise     Thyroid Disease Father         Thyroid Disease, hyperlipidemia     Other - See Comments Father         djd     Asthma Brother         Asthma     Asthma Sister         Asthma     Other - See Comments Sister         Psychiatric illness,Anxiety     Other - See Comments Son         x2 back surgeries     Breast Cancer No family hx of         Cancer-breast     Social History     Tobacco Use     Smoking status: Current Every Day Smoker     Packs/day: 0.50     Years: 32.00     Pack years: 16.00     Types: Cigarettes     Smokeless tobacco: Never Used   Substance Use Topics     Alcohol use: No     Alcohol/week: 0.0 oz     Social History     Social History Narrative     four times and now . She is unemployed.  Lives with her oldest son(he lives with her).  4 sons, 1 son lives with her others are all in the area.     Current Outpatient Medications   Medication Sig Dispense Refill     doxycycline hyclate (VIBRA-TABS) 100 MG tablet Take 1 tablet (100 mg) by mouth 2 times daily for 7 days With food 14 tablet 0     albuterol (PROAIR HFA/PROVENTIL HFA/VENTOLIN HFA) 108 (90 Base) MCG/ACT Inhaler Inhale 2 puffs into the lungs every 4 hours as  "needed 1 Inhaler 11     atorvastatin (LIPITOR) 40 MG tablet Take 1 tablet (40 mg) by mouth At Bedtime 90 tablet 4     famotidine (PEPCID) 20 MG tablet Take 1 tablet (20 mg) by mouth daily 90 tablet 3     gabapentin (NEURONTIN) 400 MG capsule Take 1 capsule (400 mg) by mouth 3 times daily 270 capsule 4     ibuprofen (ADVIL/MOTRIN) 800 MG tablet TAKE 1 TABLET BY MOUTH THREE TIMES DAILY AS NEEDED 270 tablet 3     lisinopril (PRINIVIL/ZESTRIL) 10 MG tablet TAKE 1 TABLET(10 MG) BY MOUTH DAILY 90 tablet 2     metFORMIN (GLUCOPHAGE) 500 MG tablet Take 1 tablet (500 mg) by mouth daily (with breakfast) Once daily with a meal 90 tablet 4     PARoxetine (PAXIL) 10 MG tablet TAKE 1 TABLET BY MOUTH EVERY MORNING 90 tablet 2     Allergies   Allergen Reactions     Adhesive Tape      Bee Pollen Swelling     Seasonal     Codeine      Other reaction(s): Headache  Tylenol #3     Folic Acid Itching     Pollen Extract      Seasonal     Amoxicillin Rash     Liquid Adhesive Rash     Nabumetone GI Disturbance     GI upset       Review of Systems   Constitutional: Positive for fatigue. Negative for fever.   HENT: Positive for congestion, ear pain, postnasal drip, rhinorrhea and sore throat. Negative for ear discharge.    Eyes: Negative.    Respiratory: Positive for cough. Negative for shortness of breath.    Cardiovascular: Negative for chest pain.   Gastrointestinal: Negative.    Musculoskeletal: Negative.    Skin: Negative.    Neurological: Negative for headaches.        OBJECTIVE:     /68 (BP Location: Left arm, Patient Position: Sitting, Cuff Size: Adult Regular)   Pulse 72   Temp 97.7  F (36.5  C) (Tympanic)   Resp 24   Ht 1.626 m (5' 4\")   Wt 72.9 kg (160 lb 12.8 oz)   SpO2 96%   Breastfeeding? No   BMI 27.60 kg/m    Body mass index is 27.6 kg/m .  Physical Exam   Constitutional: She appears well-developed and well-nourished. No distress.   HENT:   Head: Normocephalic and atraumatic.   Right Ear: Tympanic membrane and " external ear normal.   Left Ear: Tympanic membrane and external ear normal.   Nose: Nose normal.   Mouth/Throat: Uvula is midline and mucous membranes are normal. Posterior oropharyngeal erythema present. No oropharyngeal exudate, posterior oropharyngeal edema or tonsillar abscesses.   Voice is at a whisper.   Eyes: Conjunctivae and lids are normal.   Neck: Normal range of motion. Neck supple.   Cardiovascular: Normal rate, regular rhythm and normal heart sounds.   Pulmonary/Chest: Effort normal and breath sounds normal.   Very harsh, frequent productive cough.   Lymphadenopathy:     She has no cervical adenopathy.   Skin: She is not diaphoretic.   Nursing note and vitals reviewed.      Diagnostic Test Results:  No results found for this or any previous visit (from the past 24 hour(s)).    ASSESSMENT/PLAN:       ICD-10-CM    1. Bronchitis J40 doxycycline hyclate (VIBRA-TABS) 100 MG tablet       Medical Decision Making:  Patient with a complex medical history including tobacco abuse and persistent asthma presents with a progressively worsening cough.   Will treat with doxycycline, encouraged her to use her inhaler 3-4 times a day for the next 2 to 3 days.  She is otherwise afebrile and her lungs are clear.    PLAN:    Take prescription medications as directed.  Advised to take with food and avoid sun exposure.   Increase fluids and rest.  Return to ED/UC if symptoms worsen or concerns develop  Follow up with PCP for re-evaluation in 1 week or sooner as needed.  Given Epic educational materials.     I explained my diagnostic considerations and recommendations to patient who voiced understanding and agreement with the treatment plan. All questions were answered. We discussed potential side effects of any prescribed or recommended therapies, as well as expectations for response to treatments.    Disclaimer:  This note consists of words and symbols derived from keyboarding, dictation, or using voice recognition software.  As a result, there may be errors in the script that have gone undetected. Please consider this when interpreting information found in this note.      AMARILIS Shaffer, NP-C  4/20/2019 at 4:25 PM  [facility]

## 2019-05-12 DIAGNOSIS — E78.5 DYSLIPIDEMIA: ICD-10-CM

## 2019-05-14 RX ORDER — ATORVASTATIN CALCIUM 40 MG/1
TABLET, FILM COATED ORAL
Qty: 90 TABLET | Refills: 1 | Status: SHIPPED | OUTPATIENT
Start: 2019-05-14 | End: 2019-11-11

## 2019-06-06 ENCOUNTER — TELEPHONE (OUTPATIENT)
Dept: FAMILY MEDICINE | Facility: OTHER | Age: 54
End: 2019-06-06

## 2019-06-06 NOTE — TELEPHONE ENCOUNTER
Patient is requesting an appointment with Dr. Welsh to discuss uping her depression medication.   Patient states that her son  on may 9th and she is having a hard time coping with this.     I explained to patient that her PCP was not in today but that he would be back tomorrow and that we could address this then. Patient verbalized understanding and had no other questions at this time.    Debbie Haro LPN on 2019 at 2:40 PM

## 2019-06-06 NOTE — TELEPHONE ENCOUNTER
Patient called in regards to getting an appointment to increase her depression medications. States her son recently passed away and she feels the 10mg are not helping much. Please call her back in regards to this. Thank You!

## 2019-06-07 ENCOUNTER — OFFICE VISIT (OUTPATIENT)
Dept: FAMILY MEDICINE | Facility: OTHER | Age: 54
End: 2019-06-07
Attending: FAMILY MEDICINE
Payer: MEDICARE

## 2019-06-07 VITALS
BODY MASS INDEX: 26.37 KG/M2 | WEIGHT: 153.6 LBS | RESPIRATION RATE: 20 BRPM | DIASTOLIC BLOOD PRESSURE: 70 MMHG | HEART RATE: 84 BPM | OXYGEN SATURATION: 95 % | SYSTOLIC BLOOD PRESSURE: 102 MMHG | TEMPERATURE: 97.4 F

## 2019-06-07 DIAGNOSIS — F41.1 ANXIETY STATE: ICD-10-CM

## 2019-06-07 DIAGNOSIS — M51.379 DEGENERATIVE DISC DISEASE AT L5-S1 LEVEL: ICD-10-CM

## 2019-06-07 PROCEDURE — G0463 HOSPITAL OUTPT CLINIC VISIT: HCPCS

## 2019-06-07 PROCEDURE — 99213 OFFICE O/P EST LOW 20 MIN: CPT | Performed by: FAMILY MEDICINE

## 2019-06-07 RX ORDER — PAROXETINE 20 MG/1
20 TABLET, FILM COATED ORAL EVERY MORNING
Qty: 90 TABLET | Refills: 3 | Status: SHIPPED | OUTPATIENT
Start: 2019-06-07 | End: 2020-05-22

## 2019-06-07 RX ORDER — GABAPENTIN 400 MG/1
800 CAPSULE ORAL 2 TIMES DAILY
Qty: 360 CAPSULE | Refills: 4 | Status: SHIPPED | OUTPATIENT
Start: 2019-06-07 | End: 2020-03-24

## 2019-06-07 ASSESSMENT — ANXIETY QUESTIONNAIRES
5. BEING SO RESTLESS THAT IT IS HARD TO SIT STILL: NOT AT ALL
3. WORRYING TOO MUCH ABOUT DIFFERENT THINGS: SEVERAL DAYS
GAD7 TOTAL SCORE: 3
1. FEELING NERVOUS, ANXIOUS, OR ON EDGE: SEVERAL DAYS
7. FEELING AFRAID AS IF SOMETHING AWFUL MIGHT HAPPEN: NOT AT ALL
IF YOU CHECKED OFF ANY PROBLEMS ON THIS QUESTIONNAIRE, HOW DIFFICULT HAVE THESE PROBLEMS MADE IT FOR YOU TO DO YOUR WORK, TAKE CARE OF THINGS AT HOME, OR GET ALONG WITH OTHER PEOPLE: SOMEWHAT DIFFICULT
6. BECOMING EASILY ANNOYED OR IRRITABLE: SEVERAL DAYS
2. NOT BEING ABLE TO STOP OR CONTROL WORRYING: NOT AT ALL

## 2019-06-07 ASSESSMENT — PAIN SCALES - GENERAL: PAINLEVEL: SEVERE PAIN (6)

## 2019-06-07 ASSESSMENT — PATIENT HEALTH QUESTIONNAIRE - PHQ9
SUM OF ALL RESPONSES TO PHQ QUESTIONS 1-9: 2
5. POOR APPETITE OR OVEREATING: NOT AT ALL

## 2019-06-07 NOTE — PATIENT INSTRUCTIONS
Increased paroxetine to 20 mg daily  OK to increase gabapentin to 4 pills daily  Follow-up if not seeing improvement

## 2019-06-07 NOTE — PROGRESS NOTES
"Nursing Notes:   Madison Rodríguez, LPN  2019  2:29 PM  Signed  Pt presents to clinic today for medication management.    Previous A1C is at goal of <8  Lab Results   Component Value Date    A1C 5.8 2018    A1C 5.8 2018     Urine microalbumin:creatine: 517/18  Foot exam 17  Eye exam due    Tobacco User yes  Patient is on a daily aspirin  Patient is not on a Statin.  Blood pressure today of:     BP Readings from Last 1 Encounters:   19 102/70      is at the goal of <139/89 for diabetics.    Medication Reconciliation: complete  Madison Rodríguez LPN     SUBJECTIVE:  53 year old female presents for increased depression and anxiety. Her eldest son . He lived in her house with his girlfriend. Had a lot of medical problems. He slept on the couch and Alejandra would see him at night if she was awake. Now since he passed, feels scared at night. She does have a significant other at her house, so is not alone. Notes worse anxiety. Feels \"sad.\" Lacks appetite and has lost some weight. She used to take paroxetine 30 mg daily and reduced it herself when she was doing better. Would like to increase dose from current 10 mg.       REVIEW OF SYSTEMS:    Pertinent items are noted in HPI.    Current Outpatient Medications   Medication Sig Dispense Refill     albuterol (PROAIR HFA/PROVENTIL HFA/VENTOLIN HFA) 108 (90 Base) MCG/ACT Inhaler Inhale 2 puffs into the lungs every 4 hours as needed 1 Inhaler 11     atorvastatin (LIPITOR) 40 MG tablet TAKE 1 TABLET(40 MG) BY MOUTH AT BEDTIME 90 tablet 1     gabapentin (NEURONTIN) 400 MG capsule Take 1 capsule (400 mg) by mouth 3 times daily 270 capsule 4     ibuprofen (ADVIL/MOTRIN) 800 MG tablet TAKE 1 TABLET BY MOUTH THREE TIMES DAILY AS NEEDED 270 tablet 3     lisinopril (PRINIVIL/ZESTRIL) 10 MG tablet TAKE 1 TABLET(10 MG) BY MOUTH DAILY 90 tablet 2     metFORMIN (GLUCOPHAGE) 500 MG tablet Take 1 tablet (500 mg) by mouth daily (with breakfast) Once daily with a meal " 90 tablet 4     PARoxetine (PAXIL) 10 MG tablet TAKE 1 TABLET BY MOUTH EVERY MORNING 90 tablet 2     Allergies   Allergen Reactions     Adhesive Tape      Bee Pollen Swelling     Seasonal     Codeine      Other reaction(s): Headache  Tylenol #3     Folic Acid Itching     Pollen Extract      Seasonal     Amoxicillin Rash     Liquid Adhesive Rash     Nabumetone GI Disturbance     GI upset       OBJECTIVE:  /70   Pulse 84   Temp 97.4  F (36.3  C) (Tympanic)   Resp 20   Wt 69.7 kg (153 lb 9.6 oz)   SpO2 95%   BMI 26.37 kg/m      EXAM:  General Appearance: Alert. No acute distress  Psychiatric: Normal affect and mentation      ASSESSMENT/PLAN:    ICD-10-CM    1. Anxiety state F41.1 PARoxetine (PAXIL) 20 MG tablet   2. Degenerative disc disease at L5-S1 level M51.36 gabapentin (NEURONTIN) 400 MG capsule       Increase paroxetine from 10 to 20 mg daily. She believes this should be sufficient to help. Would like to increase gabapentin to help with anxiety from 400 mg TID to 800 mg TID. Has been on this higher dose before and tolerated. Agree to increase.   F/U in a month if not improving    Dipak Welsh MD    This document was prepared using a combination of typing and voice generated software.  While every attempt was made for accuracy, spelling and grammatical errors may exist.

## 2019-06-07 NOTE — TELEPHONE ENCOUNTER
After verifying pts name and date of birth with pt, pt notified of message below and was put in schedule today.  Madison Rodríguez

## 2019-06-07 NOTE — NURSING NOTE
Pt presents to clinic today for medication management.    Previous A1C is at goal of <8  Lab Results   Component Value Date    A1C 5.8 12/03/2018    A1C 5.8 05/11/2018     Urine microalbumin:creatine: 517/18  Foot exam 8/6/17  Eye exam due    Tobacco User yes  Patient is on a daily aspirin  Patient is not on a Statin.  Blood pressure today of:     BP Readings from Last 1 Encounters:   06/07/19 102/70      is at the goal of <139/89 for diabetics.    Medication Reconciliation: complete  Madison Rodríguez LPN

## 2019-06-08 ASSESSMENT — ASTHMA QUESTIONNAIRES: ACT_TOTALSCORE: 23

## 2019-06-08 ASSESSMENT — ANXIETY QUESTIONNAIRES: GAD7 TOTAL SCORE: 3

## 2019-06-09 ENCOUNTER — OFFICE VISIT (OUTPATIENT)
Dept: FAMILY MEDICINE | Facility: OTHER | Age: 54
End: 2019-06-09
Attending: NURSE PRACTITIONER
Payer: MEDICARE

## 2019-06-09 VITALS
BODY MASS INDEX: 26.76 KG/M2 | RESPIRATION RATE: 18 BRPM | HEART RATE: 88 BPM | TEMPERATURE: 98.7 F | SYSTOLIC BLOOD PRESSURE: 136 MMHG | WEIGHT: 155.9 LBS | DIASTOLIC BLOOD PRESSURE: 64 MMHG

## 2019-06-09 DIAGNOSIS — R21 RASH: Primary | ICD-10-CM

## 2019-06-09 PROCEDURE — 99213 OFFICE O/P EST LOW 20 MIN: CPT | Performed by: NURSE PRACTITIONER

## 2019-06-09 PROCEDURE — G0463 HOSPITAL OUTPT CLINIC VISIT: HCPCS

## 2019-06-09 RX ORDER — TRIAMCINOLONE ACETONIDE 1 MG/ML
LOTION TOPICAL 2 TIMES DAILY
Qty: 120 ML | Refills: 0 | Status: SHIPPED | OUTPATIENT
Start: 2019-06-09 | End: 2020-01-31

## 2019-06-09 RX ORDER — CETIRIZINE HYDROCHLORIDE 10 MG/1
10 TABLET ORAL DAILY
Qty: 14 TABLET | Refills: 0 | Status: SHIPPED | OUTPATIENT
Start: 2019-06-09 | End: 2019-12-08

## 2019-06-09 RX ORDER — PREDNISONE 20 MG/1
40 TABLET ORAL DAILY
Qty: 10 TABLET | Refills: 0 | Status: SHIPPED | OUTPATIENT
Start: 2019-06-09 | End: 2019-12-08

## 2019-06-09 ASSESSMENT — PAIN SCALES - GENERAL: PAINLEVEL: NO PAIN (0)

## 2019-06-09 NOTE — PROGRESS NOTES
"Nursing Notes:   Marleny Dudley LPN  6/9/2019  3:23 PM  Signed  Chief Complaint   Patient presents with     Derm Problem     for 2 days       Initial /64   Pulse 88   Temp 98.7  F (37.1  C) (Temporal)   Resp 18   Wt 70.7 kg (155 lb 14.4 oz)   Breastfeeding? No   BMI 26.76 kg/m    Estimated body mass index is 26.76 kg/m  as calculated from the following:    Height as of 4/20/19: 1.626 m (5' 4\").    Weight as of this encounter: 70.7 kg (155 lb 14.4 oz).  Medication Reconciliation: complete    Marleny Dudley LPN     She has an itchy rash for 2 days in her upper legs , abdomen and behind her neck.  Marleny Dudley LPN..................6/9/2019   3:22 PM      SUBJECTIVE:   Alejandra Villegas is a 53 year old female who presents to clinic today for the following health issues:    Rash      Duration: 2 days    Description  Location: All over  Itching: moderate    Intensity:  mild    Accompanying signs and symptoms: Denies pain, fevers, chills, nasal congestion, throat swelling.    History (similar episodes/previous evaluation): None    Precipitating or alleviating factors:  New exposures: Denies new exposures to laundry soap, fabric softener, products or lotions  Recent travel: no      Therapies tried and outcome: hydrocortisone cream -  usually effective and Benadryl/diphenhydramine -  usually effective        Problem list and histories reviewed & adjusted, as indicated.  Additional history: as documented    Patient Active Problem List   Diagnosis     Abdominal pain, left lower quadrant     Alopecia areata     Anxiety state     Benign paroxysmal positional vertigo     Other isolated or specific phobias     Degenerative disc disease at L5-S1 level     Diabetes mellitus type 2, controlled, without complications (H)     Dyslipidemia     Gastroesophageal reflux disease     Hidradenitis suppurativa     HTN (hypertension)     Symptomatic menopausal or female climacteric states     Mild persistent asthma " without complication     Obesity     Pain management contract broken     Other diseases of lung, not elsewhere classified     Pyelonephritis due to Escherichia coli     Seasonal allergic rhinitis     Tobacco use disorder     Undifferentiated inflammatory arthritis (H)     Urge incontinence     Ovarian cyst, left     Other acquired deformity of ankle and foot(736.79)     Asthma     RA (rheumatoid arthritis) (H)     Past Surgical History:   Procedure Laterality Date     ARTHROSCOPY KNEE  05/2017    Dr Torres     ARTHROSCOPY KNEE  07/20/2017    Dr Garza, lateral release, meniscal repair     ARTHROTOMY WRIST Left 2011    Dr. Torres     CHOLECYSTECTOMY  06/2007    Emergency tracheotomy following failed intubation for laparoscopic cholecystectomy.     DILATION AND CURETTAGE  09/2006          HERNIA REPAIR  2007    Incisoinal hernia repair     HYSTERECTOMY TOTAL ABDOMINAL  02/2010          IMPLANT STIMULATOR AND LEADS SACRAL NERVE (STAGE ONE AND TWO) N/A 12/11/2018    Procedure: Interstim Battery Replacement;  Surgeon: Rl Ambriz MD;  Location: [facility] OR     INTERSTIM DEVICE STAGE 2  01/06/2014    Dr. Ambriz [facility]     LAPAROSCOPIC ABLATION ENDOMETRIOSIS  09/2006          OOPHORECTOMY Left 2010     Dr Thorpe     RELEASE CARPAL TUNNEL  02/02/2017          SALPINGO OOPHORECTOMY,R/L/KELSEY Right 06/1999    Right salpingo-oophorectomy for hemorrhagic corpus luteum cyst     TRACHEOSTOMY  2007    emergency following failed intubation during cholecystectomy     TYMPANOSTOMY, LOCAL/TOPICAL ANESTHESIA  08/2006    PE tube placement       Social History     Tobacco Use     Smoking status: Current Every Day Smoker     Packs/day: 0.50     Years: 32.00     Pack years: 16.00     Types: Cigarettes     Smokeless tobacco: Never Used   Substance Use Topics     Alcohol use: No     Alcohol/week: 0.0 oz     Family History   Problem Relation Age of Onset     Arthritis Mother         Arthritis,Rheumatoid     Cancer Mother         Cancer, lung and  uterine cancer     Heart Disease Father         Heart Disease,CAD, CABG, peripheral vascular dise     Thyroid Disease Father         Thyroid Disease, hyperlipidemia     Other - See Comments Father         djd     Asthma Brother         Asthma     Asthma Sister         Asthma     Other - See Comments Sister         Psychiatric illness,Anxiety     Other - See Comments Son         x2 back surgeries     Breast Cancer No family hx of         Cancer-breast         Current Outpatient Medications   Medication Sig Dispense Refill     albuterol (PROAIR HFA/PROVENTIL HFA/VENTOLIN HFA) 108 (90 Base) MCG/ACT Inhaler Inhale 2 puffs into the lungs every 4 hours as needed 1 Inhaler 11     atorvastatin (LIPITOR) 40 MG tablet TAKE 1 TABLET(40 MG) BY MOUTH AT BEDTIME 90 tablet 1     cetirizine (ZYRTEC) 10 MG tablet Take 1 tablet (10 mg) by mouth daily for 14 days 14 tablet 0     gabapentin (NEURONTIN) 400 MG capsule Take 2 capsules (800 mg) by mouth 2 times daily 360 capsule 4     ibuprofen (ADVIL/MOTRIN) 800 MG tablet TAKE 1 TABLET BY MOUTH THREE TIMES DAILY AS NEEDED 270 tablet 3     lisinopril (PRINIVIL/ZESTRIL) 10 MG tablet TAKE 1 TABLET(10 MG) BY MOUTH DAILY 90 tablet 2     metFORMIN (GLUCOPHAGE) 500 MG tablet Take 1 tablet (500 mg) by mouth daily (with breakfast) Once daily with a meal 90 tablet 4     PARoxetine (PAXIL) 20 MG tablet Take 1 tablet (20 mg) by mouth every morning 90 tablet 3     predniSONE (DELTASONE) 20 MG tablet Take 2 tablets (40 mg) by mouth daily for 5 days 10 tablet 0     triamcinolone (KENALOG) 0.1 % external lotion Apply topically 2 times daily Up to 14 days 120 mL 0     Allergies   Allergen Reactions     Adhesive Tape      Bee Pollen Swelling     Seasonal     Codeine      Other reaction(s): Headache  Tylenol #3     Folic Acid Itching     Pollen Extract      Seasonal     Amoxicillin Rash     Liquid Adhesive Rash     Nabumetone GI Disturbance     GI upset         ROS:  Notable findings in the HPI.        OBJECTIVE:     /64   Pulse 88   Temp 98.7  F (37.1  C) (Temporal)   Resp 18   Wt 70.7 kg (155 lb 14.4 oz)   Breastfeeding? No   BMI 26.76 kg/m    Body mass index is 26.76 kg/m .  GENERAL: healthy, alert and no distress  EYES: Eyes grossly normal to inspection  HENT: normal cephalic/atraumatic and oral mucous membranes moist  RESP: Without increased work of breathing  CV: regular rates and rhythm and no peripheral edema  SKIN: Examination of the rash reveals: Hives: multiple pleomorphic, raised, well-defined, blanching patches with wheals and flares. On legs, arms, abd, back  PSYCH: mentation appears normal, affect normal/bright    Diagnostic Test Results:  none     ASSESSMENT/PLAN:     1. Rash  - predniSONE (DELTASONE) 20 MG tablet; Take 2 tablets (40 mg) by mouth daily for 5 days  Dispense: 10 tablet; Refill: 0  - cetirizine (ZYRTEC) 10 MG tablet; Take 1 tablet (10 mg) by mouth daily for 14 days  Dispense: 14 tablet; Refill: 0  - triamcinolone (KENALOG) 0.1 % external lotion; Apply topically 2 times daily Up to 14 days  Dispense: 120 mL; Refill: 0      PLAN:    Rash:  Prednisone  moisturizer  Reassurance was given to the patient  Zyrtec 10mg daily suggested for itchy rash.  Rx    Followup:    If not improving or if condition worsens, follow up with your Primary Care Provider    I explained my diagnostic considerations and recommendations to the patient, who voiced understanding and agreement with the treatment plan. All questions were answered. We discussed potential side effects of any prescribed or recommended therapies, as well as expectations for response to treatments. She was advised to contact our office if there is no improvement or worsening of conditions or symptoms.  If s/s worsen or persist, patient will either come back or follow up with PCP.    Disclaimer:  This note consists of words and symbols derived from keyboarding, dictation, or using voice recognition software. As a result,  there may be errors in the script that have gone undetected. Please consider this when interpreting information found in this note.      Julia Cullen NP, 6/9/2019 3:40 PM

## 2019-06-09 NOTE — NURSING NOTE
"Chief Complaint   Patient presents with     Derm Problem     for 2 days       Initial /64   Pulse 88   Temp 98.7  F (37.1  C) (Temporal)   Resp 18   Wt 70.7 kg (155 lb 14.4 oz)   Breastfeeding? No   BMI 26.76 kg/m   Estimated body mass index is 26.76 kg/m  as calculated from the following:    Height as of 4/20/19: 1.626 m (5' 4\").    Weight as of this encounter: 70.7 kg (155 lb 14.4 oz).  Medication Reconciliation: complete    Marleny Dudley LPN     She has an itchy rash for 2 days in her upper legs , abdomen and behind her neck.  Marleny Dudley LPN..................6/9/2019   3:22 PM    "

## 2019-06-09 NOTE — PATIENT INSTRUCTIONS
Thank you for choosing [facility] for your care.     You are advised to contact our office if there is no improvement or if there is worsening of conditions or symptoms, either come back or follow up with your primary care provider.     You were seen in the [facility]. This is for urgent care needs. If you have other questions or concerns please see your primary care provider.     You will need to be evaluated if you start to experience:  Fever higher than 102.5 F (39.2 C)   Trouble seeing or seeing double   Trouble thinking clearly   Trouble breathing or a stiff neck      Julia Cullen RN, MSN, FNP  [facility]       Take medications as described.   Cool compresses to rash   Benadryl 25-50 mg every 6 hours as needed for rash  Kenalog as prescribed or Hydrocortisone cream twice a day to the rash  Keep hydrated, 6-8 glasses of water a day.  Turn shower temp down   Launder bedding, clothes, towels, wash clothes; anything that you might have contacted with the oils or plant parts, or bugs

## 2019-06-26 DIAGNOSIS — E11.9 CONTROLLED TYPE 2 DIABETES MELLITUS WITHOUT COMPLICATION, WITHOUT LONG-TERM CURRENT USE OF INSULIN (H): ICD-10-CM

## 2019-06-28 NOTE — TELEPHONE ENCOUNTER
LOV with PCP was on 6/7/19. Rx as requested is noted in office visit notes on that date with no changes and patient was to follow up as needed in one months time. Writer will refill Rx as requested for a 90 day supply.    Prescription refilled per RN Medication Refill Policy..................Armando Gunn 6/28/2019 9:30 AM

## 2019-09-26 ENCOUNTER — OFFICE VISIT (OUTPATIENT)
Dept: FAMILY MEDICINE | Facility: OTHER | Age: 54
End: 2019-09-26
Attending: FAMILY MEDICINE
Payer: MEDICARE

## 2019-09-26 VITALS
DIASTOLIC BLOOD PRESSURE: 70 MMHG | RESPIRATION RATE: 16 BRPM | OXYGEN SATURATION: 94 % | BODY MASS INDEX: 27.66 KG/M2 | SYSTOLIC BLOOD PRESSURE: 102 MMHG | HEIGHT: 64 IN | WEIGHT: 162 LBS | TEMPERATURE: 98.1 F | HEART RATE: 72 BPM

## 2019-09-26 DIAGNOSIS — R05.9 COUGH: ICD-10-CM

## 2019-09-26 DIAGNOSIS — J01.01 ACUTE RECURRENT MAXILLARY SINUSITIS: ICD-10-CM

## 2019-09-26 DIAGNOSIS — J02.0 STREPTOCOCCAL PHARYNGITIS: Primary | ICD-10-CM

## 2019-09-26 PROCEDURE — G0463 HOSPITAL OUTPT CLINIC VISIT: HCPCS

## 2019-09-26 PROCEDURE — 99214 OFFICE O/P EST MOD 30 MIN: CPT | Performed by: PHYSICIAN ASSISTANT

## 2019-09-26 RX ORDER — CODEINE PHOSPHATE AND GUAIFENESIN 10; 100 MG/5ML; MG/5ML
1-2 SOLUTION ORAL EVERY 6 HOURS PRN
Qty: 120 ML | Refills: 0 | Status: SHIPPED | OUTPATIENT
Start: 2019-09-26 | End: 2020-01-31

## 2019-09-26 RX ORDER — BENZONATATE 200 MG/1
200 CAPSULE ORAL 3 TIMES DAILY PRN
Qty: 21 CAPSULE | Refills: 0 | Status: SHIPPED | OUTPATIENT
Start: 2019-09-26 | End: 2019-12-08

## 2019-09-26 RX ORDER — AZITHROMYCIN 250 MG/1
TABLET, FILM COATED ORAL
Qty: 6 TABLET | Refills: 0 | Status: SHIPPED | OUTPATIENT
Start: 2019-09-26 | End: 2019-12-08

## 2019-09-26 ASSESSMENT — PAIN SCALES - GENERAL: PAINLEVEL: SEVERE PAIN (7)

## 2019-09-26 ASSESSMENT — MIFFLIN-ST. JEOR: SCORE: 1334.83

## 2019-09-26 NOTE — NURSING NOTE
Patient presenting with complaints of a cough, right ear pain, and runny nose. She states she feels like she has pneumonia.  No LMP recorded. Patient is postmenopausal.  Medication Reconciliation: complete    Martita Bishop LPN  9/26/2019 2:32 PM

## 2019-09-26 NOTE — PATIENT INSTRUCTIONS
Throat is erythematous with petechial rash, household exposure to strep  Will treat for strep pharyngitis  Maxillary and frontal sinus tenderness with cough and chest pain  Lungs are clear on exam  Start Azithromycin 250 mg oral tablet, take 2 tablets day 1, 1 tablet day 2-5  Rest, and fluids  Humidifier, vicks vapor rub  Benzonatate 200 mg oral capsule take one capsule 3 times daily as needed x 7 days  Robitussin AC, take 1-2 tsp every 4-6 hours as needed for cough, this can cause drowsiness. Do not mix with alcohol or drive while on this mediations.   Tylenol and ibuprofen as needed for discomfort or fever  Return to clinic if symptoms persist or worsen      Patient Education     Pharyngitis: Strep (Presumed)    You have pharyngitis (sore throat). The healthcare staff think your sore throat is caused by streptococcus (strep) bacteria. This is often called strep throat. Strep throat can cause throat pain that is worse when swallowing, aching all over, headache, and fever. The infection is contagious. It may be spread by coughing, kissing, or touching others after touching your mouth or nose. Antibiotic medicine is given to treat the infection.  Home care    Rest at home. Drink plenty of fluids so you won t get dehydrated.    Stay home from work or school for the first 2 days of taking the antibiotics. After this time, you will not be contagious. You can then return to work or school if you are feeling better.     Take the antibiotic medicine for the full 10 days, even when you feel better. This is very important to make sure the infection is fully treated. It is also important to prevent medicine-resistant germs from growing. If you were given an antibiotic shot, no more antibiotics are needed.    You may use acetaminophen or ibuprofen to control pain or fever, unless another medicine was prescribed for this. If you have chronic liver or kidney disease or ever had a stomach ulcer or GI bleeding, talk with your  healthcare provider before using these medicines.    Use throat lozenges or a throat-numbing spray to help reduce throat pain. Gargling with warm salt water can also help reduce throat pain. Dissolve 1/2 teaspoon of salt in 1 glass of warm water.     Don t eat salty or spicy foods. These can irritate the throat.  Follow-up care  Follow up with your healthcare provider or our staff if you don't get better over the next week.  When to seek medical advice  Call your healthcare provider right away if any of these occur:    Fever as directed by your healthcare provider    New or worse ear pain, sinus pain, or headache    Painful lumps in the back of neck    Stiff neck    Lymph nodes that get larger    Can t swallow liquids, a lot of drooling, or can t open mouth wide due to throat pain    Signs of dehydration, such as very dark urine or no urine, sunken eyes, dizziness    Trouble breathing or noisy breathing    Muffled voice    New rash  Prevention  Here are steps you can take to help prevent an infection:    Keep good hand washing habits.    Don t have close contact with people who have sore throats, colds, or other upper respiratory infections.    Don t smoke, and stay away from secondhand smoke.    Stay up to date with of your vaccines.  Date Last Reviewed: 11/1/2017 2000-2018 [employer]. [address] [place] [address], [place] [address]. All rights reserved. This information is not intended as a substitute for professional medical care. Always follow your healthcare professional's instructions.           Patient Education     Acute Sinusitis    Acute sinusitis is irritation and swelling of the sinuses. It is usually caused by a viral infection after a common cold. Your doctor can help you find relief.  What is acute sinusitis?  Sinuses are air-filled spaces in the skull behind the face. They are kept moist and clean by a lining of mucosa. Things such as pollen, smoke, and chemical fumes can irritate the  mucosa. It can then swell up. As a response to irritation, the mucosa makes more mucus and other fluids. Tiny hairlike cilia cover the mucosa. Cilia help carry mucus toward the opening of the sinus. Too much mucus may cause the cilia to stop working. This blocks the sinus opening. A buildup of fluid in the sinuses then causes pain and pressure. It can also encourage bacteria to grow in the sinuses.  Common symptoms of acute sinusitis  You may have:    Facial soreness pain    Headache    Fever    Fluid draining in the back of the throat (postnasal drip)    Congestion    Drainage that is thick and colored, instead of clear    Cough  Diagnosing acute sinusitis  Your doctor will ask about your symptoms and health history. He or she will look at your ear, nose, and throat. You usually won't need to have X-rays taken.    The doctor may take a sample of mucus to check for bacteria. If you have sinusitis that keeps coming back, you may need imaging tests such as X-rays or CAT scans. This will help your doctor check for a structural problem that may be causing the infection.  Treating acute sinusitis  Treatment is aimed at unblocking the sinus opening and helping the cilia work again. You may need to take antihistamine and decongestant medicine. These can reduce inflammation and decrease the amount of fluid your sinuses make. If you have a bacterial infection, you will need to take antibiotic medicine for 10 to 14 days. Take this medicine until it is gone, even if you feel better.  Date Last Reviewed: 10/1/2016    0956-5485 [employer]. [address] [place] [address], [place] [address]. All rights reserved. This information is not intended as a substitute for professional medical care. Always follow your healthcare professional's instructions.

## 2019-09-26 NOTE — PROGRESS NOTES
SUBJECTIVE:  Alejandra Villegas is a 53 year old female who presents to the clinic today for evaluation of cough, right ear pain, fever, headaches, congestion  Onset 3 days ago, course is gradually worsening  Associated symptoms: as above. T.Max not known. Has had chills and sweats  Cough is deep, and harsh, dry, chest pain with cough. No shortness of breath    Treatments ibuprofen this AM, claritin this AM  Exposures - strep - son  History of asthma and environmental allergies is - none  Tobacco use or second hand smoke exposure history - 1/2 ppd      Past Medical History:   Diagnosis Date     Allergic rhinitis 09/27/2011          Disorder of kidney and ureter 08/08/2008    Kidney disease GFR 87     Dorsalgia     No Comments Provided     Excessive and frequent menstruation with regular cycle     Menorrhagia     Generalized anxiety disorder     No Comments Provided     Hidradenitis suppurativa     No Comments Provided     Hyperlipidemia     No Comments Provided     Obesity 07/01/2007    Obesity  Body-mass index over 30     Other disorders of lung (CODE) 08/01/2007    Pulmonary nodules, stable on follow-up chest CT 12/08.  No further imaging recommended.     Other specified disorders of Eustachian tube, unspecified ear 02/27/2012          Other specified phobia     No Comments Provided     Personal history of other medical treatment (CODE)     Childbirth x4     Rheumatoid arthritis (H) 02/27/2012          Tobacco use     No Comments Provided     Type 2 diabetes mellitus without complications (H) 07/01/2007          Uncomplicated asthma     No Comments Provided      Social History     Tobacco Use     Smoking status: Current Every Day Smoker     Packs/day: 0.50     Years: 32.00     Pack years: 16.00     Types: Cigarettes     Smokeless tobacco: Never Used   Substance Use Topics     Alcohol use: No     Alcohol/week: 0.0 standard drinks     Current Outpatient Medications   Medication     albuterol (PROAIR HFA/PROVENTIL  "HFA/VENTOLIN HFA) 108 (90 Base) MCG/ACT Inhaler     atorvastatin (LIPITOR) 40 MG tablet     gabapentin (NEURONTIN) 400 MG capsule     ibuprofen (ADVIL/MOTRIN) 800 MG tablet     lisinopril (PRINIVIL/ZESTRIL) 10 MG tablet     metFORMIN (GLUCOPHAGE) 500 MG tablet     PARoxetine (PAXIL) 20 MG tablet     triamcinolone (KENALOG) 0.1 % external lotion     No current facility-administered medications for this visit.         Allergies   Allergen Reactions     Adhesive Tape      Bee Pollen Swelling     Seasonal     Codeine      Other reaction(s): Headache  Tylenol #3     Folic Acid Itching     Pollen Extract      Seasonal     Amoxicillin Rash     Liquid Adhesive Rash     Nabumetone GI Disturbance     GI upset     ROS  General - fatigue, fevers  HENT - POSITIVE per HPI  Respiratory POSITIVE per HPI  Abdomen POSITIVE for nausea      OBJECTIVE:  Exam:  Vitals:    09/26/19 1433   BP: 102/70   Pulse: 72   Resp: 16   Temp: 98.1  F (36.7  C)   TempSrc: Tympanic   SpO2: 94%   Weight: 73.5 kg (162 lb)   Height: 1.626 m (5' 4\")     General: mild distress, appears hydarated, vital signs stable   Head: NORMAL - atraumatic, nontender.  Ears: mild effusion bilaterally  Eyes: NORMAL - no injection no discharge, no periorbital swelling.  Nose: ABNORMAL - swollen nasal turbinates. Frontal and maxillary sinusitis  Neck: supple, non-tender, free range of motion, no adenopathy  Throat: ABNORMAL - marked erythema,  petechia rash  Resp: Normal - Clear to auscultation without rales, rhonchi, or wheezing.  Cardiac: NORMAL - regular rate and rhythm without murmur.      ASSESSMENT:    (J02.0) Streptococcal pharyngitis  (primary encounter diagnosis)  Plan: azithromycin (ZITHROMAX) 250 MG tablet      (J01.01) Acute recurrent maxillary sinusitis  Plan: symptomatic treatments    (R05) Cough  Comment:  Symptomatic treatments  Plan: benzonatate (TESSALON) 200 MG capsule,         guaiFENesin-codeine (ROBITUSSIN AC) 100-10         MG/5ML solution    Throat is " erythematous with petechial rash, household exposure to strep, reporting fever   Will treat for strep pharyngitis  Maxillary and frontal sinus tenderness with cough and chest pain  Lungs are clear on exam  Start Azithromycin 250 mg oral tablet, take 2 tablets day 1, 1 tablet day 2-5  Rest, and fluids  Humidifier, vicks vapor rub  Benzonatate 200 mg oral capsule take one capsule 3 times daily as needed x 7 days  Robitussin AC, take 1-2 tsp every 4-6 hours as needed for cough, this can cause drowsiness. Do not mix with alcohol or drive while on this mediations.   Tylenol and ibuprofen as needed for discomfort or fever  Return to clinic if symptoms persist or worsen  Patient received verbal and written instruction including review of warning signs

## 2019-09-28 DIAGNOSIS — E11.9 CONTROLLED TYPE 2 DIABETES MELLITUS WITHOUT COMPLICATION, WITHOUT LONG-TERM CURRENT USE OF INSULIN (H): ICD-10-CM

## 2019-09-28 NOTE — LETTER
October 2, 2019      Alejandra Villegas     [facility] [id number]        Dear Alejandra,     A refill request was received from your pharmacy for Metformin.    Additional refills require an office visit for diabetic follow up and labs.    Please call [phone] to schedule appointment.      Sincerely,      Refill Nurse

## 2019-10-02 NOTE — TELEPHONE ENCOUNTER
Routing refill request to provider for review/approval because:   Patient has had a Microalbumin in the past 15 mos.    Patient has documented A1c within the specified period of time.     LOV; 6/7/19  Letter sent   Patient due for diabetic follow up and labs.  Kathleen Bishop RN on 10/2/2019 at 12:34 PM

## 2019-11-11 DIAGNOSIS — E78.5 DYSLIPIDEMIA: ICD-10-CM

## 2019-11-12 RX ORDER — ATORVASTATIN CALCIUM 40 MG/1
TABLET, FILM COATED ORAL
Qty: 90 TABLET | Refills: 0 | Status: SHIPPED | OUTPATIENT
Start: 2019-11-12 | End: 2020-02-17

## 2019-11-12 NOTE — TELEPHONE ENCOUNTER
ATORVASTATIN 40MG TABLETS  Last Written Prescription Date:  5/14/2019  Last Fill Quantity: 90,   # refills: 1  Last Office Visit: 5/14/2019  Future Office visit:      Passed - No abnormal creatine kinase in past 12 months    No lab results found.          Routing refill request to provider for review/approval because:  Is in need of Creatinine Kinase per protocol.  Will forward to provider for consideration.  Unable to complete prescription refill per RNMedication Refill Policy.................... Gena Torres RN ....................  11/12/2019   2:19 PM

## 2019-12-08 ENCOUNTER — HOSPITAL ENCOUNTER (OUTPATIENT)
Dept: GENERAL RADIOLOGY | Facility: OTHER | Age: 54
Discharge: HOME | End: 2019-12-08
Attending: PHYSICIAN ASSISTANT | Admitting: PHYSICIAN ASSISTANT
Payer: MEDICARE

## 2019-12-08 ENCOUNTER — OFFICE VISIT (OUTPATIENT)
Dept: FAMILY MEDICINE | Facility: OTHER | Age: 54
End: 2019-12-08
Attending: PHYSICIAN ASSISTANT
Payer: MEDICARE

## 2019-12-08 VITALS
SYSTOLIC BLOOD PRESSURE: 116 MMHG | HEART RATE: 68 BPM | HEIGHT: 66 IN | TEMPERATURE: 97.1 F | BODY MASS INDEX: 25.23 KG/M2 | OXYGEN SATURATION: 97 % | RESPIRATION RATE: 18 BRPM | WEIGHT: 157 LBS | DIASTOLIC BLOOD PRESSURE: 70 MMHG

## 2019-12-08 DIAGNOSIS — R05.9 COUGH: ICD-10-CM

## 2019-12-08 DIAGNOSIS — J45.31 BRONCHITIS, ASTHMATIC, MILD PERSISTENT, WITH ACUTE EXACERBATION: Primary | ICD-10-CM

## 2019-12-08 DIAGNOSIS — Z20.818 STREP THROAT EXPOSURE: ICD-10-CM

## 2019-12-08 LAB
FLUAV+FLUBV RNA SPEC QL NAA+PROBE: NEGATIVE
FLUAV+FLUBV RNA SPEC QL NAA+PROBE: NEGATIVE
RSV RNA SPEC NAA+PROBE: NEGATIVE
SPECIMEN SOURCE: NORMAL
SPECIMEN SOURCE: NORMAL
STREP GROUP A PCR: NOT DETECTED

## 2019-12-08 PROCEDURE — 87651 STREP A DNA AMP PROBE: CPT | Mod: ZL | Performed by: PHYSICIAN ASSISTANT

## 2019-12-08 PROCEDURE — 94640 AIRWAY INHALATION TREATMENT: CPT | Performed by: PHYSICIAN ASSISTANT

## 2019-12-08 PROCEDURE — 87631 RESP VIRUS 3-5 TARGETS: CPT | Mod: ZL | Performed by: PHYSICIAN ASSISTANT

## 2019-12-08 PROCEDURE — G0463 HOSPITAL OUTPT CLINIC VISIT: HCPCS | Mod: 25

## 2019-12-08 PROCEDURE — 99214 OFFICE O/P EST MOD 30 MIN: CPT | Performed by: PHYSICIAN ASSISTANT

## 2019-12-08 PROCEDURE — 71046 X-RAY EXAM CHEST 2 VIEWS: CPT

## 2019-12-08 PROCEDURE — G0463 HOSPITAL OUTPT CLINIC VISIT: HCPCS

## 2019-12-08 PROCEDURE — 25000125 ZZHC RX 250: Performed by: PHYSICIAN ASSISTANT

## 2019-12-08 RX ORDER — IPRATROPIUM BROMIDE AND ALBUTEROL SULFATE 2.5; .5 MG/3ML; MG/3ML
3 SOLUTION RESPIRATORY (INHALATION)
Status: DISCONTINUED | OUTPATIENT
Start: 2019-12-08 | End: 2019-12-08 | Stop reason: ALTCHOICE

## 2019-12-08 RX ORDER — DEXAMETHASONE SODIUM PHOSPHATE 4 MG/ML
10 VIAL (ML) INJECTION ONCE
Status: COMPLETED | OUTPATIENT
Start: 2019-12-08 | End: 2019-12-08

## 2019-12-08 RX ORDER — ALBUTEROL SULFATE 0.83 MG/ML
2.5 SOLUTION RESPIRATORY (INHALATION) EVERY 6 HOURS PRN
Qty: 30 VIAL | Refills: 0 | Status: SHIPPED | OUTPATIENT
Start: 2019-12-08 | End: 2020-03-24

## 2019-12-08 RX ORDER — PREDNISONE 20 MG/1
40 TABLET ORAL DAILY
Qty: 8 TABLET | Refills: 0 | Status: SHIPPED | OUTPATIENT
Start: 2019-12-09 | End: 2020-01-31

## 2019-12-08 RX ORDER — AZITHROMYCIN 250 MG/1
TABLET, FILM COATED ORAL
Qty: 6 TABLET | Refills: 0 | Status: SHIPPED | OUTPATIENT
Start: 2019-12-08 | End: 2020-01-31

## 2019-12-08 RX ORDER — ALBUTEROL SULFATE 0.83 MG/ML
2.5 SOLUTION RESPIRATORY (INHALATION) ONCE
Status: COMPLETED | OUTPATIENT
Start: 2019-12-08 | End: 2019-12-08

## 2019-12-08 RX ADMIN — ALBUTEROL SULFATE 2.5 MG: 2.5 SOLUTION RESPIRATORY (INHALATION) at 13:17

## 2019-12-08 RX ADMIN — DEXAMETHASONE SODIUM PHOSPHATE 10 MG: 4 INJECTION, SOLUTION INTRAMUSCULAR; INTRAVENOUS at 13:17

## 2019-12-08 ASSESSMENT — PAIN SCALES - GENERAL: PAINLEVEL: EXTREME PAIN (9)

## 2019-12-08 ASSESSMENT — MIFFLIN-ST. JEOR: SCORE: 1335.96

## 2019-12-08 NOTE — PROGRESS NOTES
HPI:    Alejandra Villegas is a 53 year old female who presents to clinic today for evaluation of headache, nasal congestion, cough, right ear pain, fatigue, body aches  Onset 2 days ago, course is gradually worsening  Step son has had strep  She has history of mild persistent asthma  She also uses tobacco 1/2 ppd    Past Medical History:   Diagnosis Date     Allergic rhinitis 09/27/2011          Disorder of kidney and ureter 08/08/2008    Kidney disease GFR 87     Dorsalgia     No Comments Provided     Excessive and frequent menstruation with regular cycle     Menorrhagia     Generalized anxiety disorder     No Comments Provided     Hidradenitis suppurativa     No Comments Provided     Hyperlipidemia     No Comments Provided     Obesity 07/01/2007    Obesity  Body-mass index over 30     Other disorders of lung (CODE) 08/01/2007    Pulmonary nodules, stable on follow-up chest CT 12/08.  No further imaging recommended.     Other specified disorders of Eustachian tube, unspecified ear 02/27/2012          Other specified phobia     No Comments Provided     Personal history of other medical treatment (CODE)     Childbirth x4     Rheumatoid arthritis (H) 02/27/2012          Tobacco use     No Comments Provided     Type 2 diabetes mellitus without complications (H) 07/01/2007          Uncomplicated asthma     No Comments Provided       Social History     Socioeconomic History     Marital status:      Spouse name: Not on file     Number of children: Not on file     Years of education: Not on file     Highest education level: Not on file   Occupational History     Not on file   Social Needs     Financial resource strain: Not on file     Food insecurity:     Worry: Not on file     Inability: Not on file     Transportation needs:     Medical: Not on file     Non-medical: Not on file   Tobacco Use     Smoking status: Current Every Day Smoker     Packs/day: 0.50     Years: 32.00     Pack years: 16.00     Types:  Cigarettes     Smokeless tobacco: Never Used   Substance and Sexual Activity     Alcohol use: No     Alcohol/week: 0.0 standard drinks     Drug use: No     Comment: Drug use: No     Sexual activity: Not Currently   Lifestyle     Physical activity:     Days per week: Not on file     Minutes per session: Not on file     Stress: Not on file   Relationships     Social connections:     Talks on phone: Not on file     Gets together: Not on file     Attends Religion service: Not on file     Active member of club or organization: Not on file     Attends meetings of clubs or organizations: Not on file     Relationship status: Not on file     Intimate partner violence:     Fear of current or ex partner: Not on file     Emotionally abused: Not on file     Physically abused: Not on file     Forced sexual activity: Not on file   Other Topics Concern     Parent/sibling w/ CABG, MI or angioplasty before 65F 55M? Not Asked   Social History Narrative     four times and now . She is unemployed.  Lives with her oldest son(he lives with her).  4 sons, 1 son lives with her others are all in the area.       Current Outpatient Medications   Medication Sig Dispense Refill     albuterol (PROAIR HFA/PROVENTIL HFA/VENTOLIN HFA) 108 (90 Base) MCG/ACT Inhaler Inhale 2 puffs into the lungs every 4 hours as needed 1 Inhaler 11     atorvastatin (LIPITOR) 40 MG tablet TAKE 1 TABLET(40 MG) BY MOUTH AT BEDTIME 90 tablet 0     gabapentin (NEURONTIN) 400 MG capsule Take 2 capsules (800 mg) by mouth 2 times daily 360 capsule 4     ibuprofen (ADVIL/MOTRIN) 800 MG tablet TAKE 1 TABLET BY MOUTH THREE TIMES DAILY AS NEEDED 270 tablet 3     lisinopril (PRINIVIL/ZESTRIL) 10 MG tablet TAKE 1 TABLET(10 MG) BY MOUTH DAILY 90 tablet 2     metFORMIN (GLUCOPHAGE) 500 MG tablet TAKE 1 TABLET(500 MG) BY MOUTH EVERY DAY WITH BREAKFAST 90 tablet 1     PARoxetine (PAXIL) 20 MG tablet Take 1 tablet (20 mg) by mouth every morning 90 tablet 3      "guaiFENesin-codeine (ROBITUSSIN AC) 100-10 MG/5ML solution Take 5-10 mLs by mouth every 6 hours as needed for cough (Patient not taking: Reported on 12/8/2019) 120 mL 0     triamcinolone (KENALOG) 0.1 % external lotion Apply topically 2 times daily Up to 14 days (Patient not taking: Reported on 12/8/2019) 120 mL 0       Allergies   Allergen Reactions     Adhesive Tape      Bee Pollen Swelling     Seasonal     Codeine      Other reaction(s): Headache  Tylenol #3     Folic Acid Itching     Pollen Extract      Seasonal     Amoxicillin Rash     Liquid Adhesive Rash     Nabumetone GI Disturbance     GI upset       ROS:  General: fatigue  HENT: POSITIVE per HPI  RESPIRATORY: POSITIVE per HPI  Abdomen: negative  Skin: negative    EXAM:  Vitals:    12/08/19 1245   BP: 116/70   Pulse: 68   Resp: 18   Temp: 97.1  F (36.2  C)   TempSrc: Tympanic   SpO2: 97%   Weight: 71.2 kg (157 lb)   Height: 1.664 m (5' 5.5\")     General appearance: well appearing female, in mild distress  Head: normocephalic, atraumatic  Ears: mild clear effusion bilaterally  Eyes: conjunctivae normal  Orophayrnx: moist mucous membranes, moderate erythema, no exudates or petechia  Neck: supple with mild adenopathy  Respiratory: normal respiration, wheezing throughout  Cardiac: RRR with no murmurs  Dermatological: no rashes or lesions  Psychological: normal affect, alert and pleasant    Results for orders placed or performed in visit on 12/08/19   Group A Streptococcus PCR Throat Swab     Status: None   Result Value Ref Range    Specimen Description Throat     Strep Group A PCR Not Detected NDET^Not Detected   Influenza A and B and RSV PCR     Status: None   Result Value Ref Range    Specimen Description Nasopharyngeal     Influenza A PCR Negative NEG^Negative    Influenza B PCR Negative NEG^Negative    Resp Syncytial Virus Negative NEG^Negative           ASSESSMENT AND PLAN:    1. Bronchitis, asthmatic, mild persistent, with acute exacerbation    2. Cough  "   3. Strep throat exposure      Cough, strep exposure  Strep PCR is negative  Influenza/RSV negative  Chest XR - possible infiltrate right LL, independently reviewed.   Dexamethasone 10 mg oral and albuterol nebulizer given in clinic with improved air flow  Will treat for lower respiratory infection to cover bronchitis/pneumonia  Will also treat for asthma exacerbation  Start Azithromycin 250 mg oral tablet, take 2 tablets day 1, 1 tablet day 2-5  Start prednisone 20 mg oral tablet, take 2 tablets in AM with food for 4 days, start tomorrow  Smoking cessation encouraged  Symptomatic measures per AVS  Return to clinic no improvement in 24-48 hours or for worsening  Patient received verbal and written instruction including review of warning signs    Anne Marie Mclain PA-C on 12/8/2019 at 3:40 PM

## 2019-12-08 NOTE — NURSING NOTE
"Chief Complaint   Patient presents with     Headache     Nasal Congestion     Patient is here for a headache, stuffy nose, cough and right ear pain that started 2 days ago.     Initial /70   Pulse 68   Temp 97.1  F (36.2  C) (Tympanic)   Resp 18   Ht 1.664 m (5' 5.5\")   Wt 71.2 kg (157 lb)   SpO2 97%   BMI 25.73 kg/m   Estimated body mass index is 25.73 kg/m  as calculated from the following:    Height as of this encounter: 1.664 m (5' 5.5\").    Weight as of this encounter: 71.2 kg (157 lb).  Medication Reconciliation: complete    Franny Hernandez LPN  "

## 2019-12-08 NOTE — PATIENT INSTRUCTIONS
Cough, congestion, right ear pain  Right ear pain - mild effusion, no infection  Lungs with wheezing throughtout  Chest XR with possible pneumonia RLL  Dexamethasone and albuterol neb given in clinic  Strep exposure - Strep PCR is negative  Influenza/RSV screen pending, we will call with results.     Will treat for bronchitis/asthma exacerbation  Start Azithromycin 250 mg oral tablet, take 2 tablets day 1, 1 tablet day 2-5  Start prednisone 20 mg oral tablet, take 2 tablets in AM with food for 4 day, start this tomorrow  Home neb set up in clinic  Albuterol neb every 4-6 hours as needed  Return to clinic if symptoms are not improved in 24-48 hours or for worsening  Seek immediate care for    No improvement in 24-48 hours    Fever of 100.4 F (38 C) or higher, or as directed by your healthcare provider    Coughing up more sputum    Weakness, drowsiness, headache, facial pain, ear pain, or a stiff neck      Patient Education     Bronchitis, Antibiotic Treatment (Adult)    Bronchitis is an infection of the air passages (bronchial tubes) in your lungs. It often occurs when you have a cold. This illness is contagious during the first few days and is spread through the air by coughing and sneezing, or by direct contact (touching the sick person and then touching your own eyes, nose, or mouth).  Symptoms of bronchitis include cough with mucus (phlegm) and low-grade fever. Bronchitis usually lasts 7 to 14 days. Mild cases can be treated with simple home remedies. More severe infection is treated with an antibiotic.  Home care  Follow these guidelines when caring for yourself at home:    If your symptoms are severe, rest at home for the first 2 to 3 days. When you go back to your usual activities, don't let yourself get too tired.    Don't smoke. Also stay away from secondhand smoke.    You may use over-the-counter medicines to control fever or pain, unless another medicine was prescribed. If you have chronic liver or kidney  disease or have ever had a stomach ulcer or gastrointestinal bleeding, talk with your healthcare provider before using these medicines. Also talk to your provider if you are taking medicine to prevent blood clots. Aspirin should never be given to anyone younger than 18 who is ill with a viral infection or fever. It may cause severe liver or brain damage.    Your appetite may be low, so a light diet is fine. Stay well hydrated by drinking 6 to 8 glasses of fluids per day. This includes water, soft drinks, sports drinks, juices, tea, or soup. Extra fluids will help loosen mucus in your nose and lungs.    Over-the-counter cough, cold, and sore-throat medicines will not shorten the length of the illness, but they may be helpful to reduce your symptoms. Don't use decongestants if you have high blood pressure.    Finish all antibiotic medicine. Do this even if you are feeling better after only a few days.  Follow-up care  Follow up with your healthcare provider, or as advised. If you had an X-ray or ECG (electrocardiogram), a specialist will review it. You will be told of any new test results that may affect your care.  If you are age 65 or older, if you smoke, or if you have a chronic lung disease or condition that affects your immune system, ask your healthcare provider about getting a pneumococcal vaccine and a yearly flu shot (influenza vaccine).  When to seek medical advice  Call your healthcare provider right away if any of these occur:    Fever of 100.4 F (38 C) or higher, or as directed by your healthcare provider    Coughing up more sputum    Weakness, drowsiness, headache, facial pain, ear pain, or a stiff neck  Call 911  Call 911 if any of these occur.    Coughing up blood    Weakness, drowsiness, headache, or stiff neck that get worse    Trouble breathing, wheezing, or pain with breathing  Date Last Reviewed: 6/1/2018 2000-2018 [employer]. [address] [place] [address], [place] [address]. All rights  reserved. This information is not intended as a substitute for professional medical care. Always follow your healthcare professional's instructions.

## 2019-12-09 ENCOUNTER — TELEPHONE (OUTPATIENT)
Dept: FAMILY MEDICINE | Facility: OTHER | Age: 54
End: 2019-12-09

## 2019-12-09 DIAGNOSIS — J45.31 BRONCHITIS, ASTHMATIC, MILD PERSISTENT, WITH ACUTE EXACERBATION: Primary | ICD-10-CM

## 2019-12-09 RX ORDER — CODEINE PHOSPHATE AND GUAIFENESIN 10; 100 MG/5ML; MG/5ML
1-2 SOLUTION ORAL EVERY 4 HOURS PRN
Qty: 120 ML | Refills: 0 | Status: SHIPPED | OUTPATIENT
Start: 2019-12-09 | End: 2020-01-31

## 2019-12-09 RX ORDER — BENZONATATE 200 MG/1
200 CAPSULE ORAL 3 TIMES DAILY PRN
Qty: 21 CAPSULE | Refills: 0 | Status: SHIPPED | OUTPATIENT
Start: 2019-12-09 | End: 2020-01-31

## 2019-12-09 RX ORDER — BENZONATATE 200 MG/1
200 CAPSULE ORAL 3 TIMES DAILY PRN
Qty: 21 CAPSULE | Refills: 0 | Status: CANCELLED | OUTPATIENT
Start: 2019-12-09 | End: 2019-12-16

## 2019-12-09 NOTE — TELEPHONE ENCOUNTER
Franny, can you let her know I have printed a script for this. She will have to have someone come and pick it up, unless her pharmacy accepts a fax. You could try that. Thank you. DESEAN Saul

## 2019-12-09 NOTE — TELEPHONE ENCOUNTER
Patient states that she does not actually have trouble with codeine and has had the Robitussin AC before with no problem. Patient is requesting the Robitussin AC as well.     Franny Hernandez LPN on 12/9/2019 at 12:56 PM

## 2019-12-09 NOTE — TELEPHONE ENCOUNTER
Reddy Blanton, can you let patient know I sent a script for Benzonatate 200 mg oral capsule, take this 3 times daily as needed to her pharmacy.  She should also be using OTC guaifenesin with dextromethorphan (Mucinex/robitussin DM) take as directed. She has a contraindication to taking codeine so I did not prescribe cough medication with this today. Thank you. DESEAN Saul

## 2020-01-15 DIAGNOSIS — F41.1 ANXIETY STATE: ICD-10-CM

## 2020-01-16 RX ORDER — PAROXETINE 10 MG/1
TABLET, FILM COATED ORAL
Qty: 90 TABLET | Refills: 1 | OUTPATIENT
Start: 2020-01-16

## 2020-01-16 NOTE — TELEPHONE ENCOUNTER
Patients dose was increased at OV 6/7/19 by Dipak Welsh to 20 mg daily. Called pharmacy and they have refills of the 20 mg on file and last filled 12/4/19. Will disregard request. Steffany Bowen RN on 1/16/2020 at 1:11 PM

## 2020-01-31 ENCOUNTER — OFFICE VISIT (OUTPATIENT)
Dept: FAMILY MEDICINE | Facility: OTHER | Age: 55
End: 2020-01-31
Attending: NURSE PRACTITIONER
Payer: MEDICARE

## 2020-01-31 VITALS
SYSTOLIC BLOOD PRESSURE: 121 MMHG | HEART RATE: 74 BPM | HEIGHT: 67 IN | OXYGEN SATURATION: 94 % | TEMPERATURE: 97.2 F | WEIGHT: 168 LBS | DIASTOLIC BLOOD PRESSURE: 81 MMHG | BODY MASS INDEX: 26.37 KG/M2 | RESPIRATION RATE: 16 BRPM

## 2020-01-31 DIAGNOSIS — J11.1 INFLUENZA-LIKE ILLNESS: ICD-10-CM

## 2020-01-31 DIAGNOSIS — R05.9 COUGH: Primary | ICD-10-CM

## 2020-01-31 PROCEDURE — G0463 HOSPITAL OUTPT CLINIC VISIT: HCPCS

## 2020-01-31 PROCEDURE — 99213 OFFICE O/P EST LOW 20 MIN: CPT | Performed by: FAMILY MEDICINE

## 2020-01-31 RX ORDER — CODEINE PHOSPHATE AND GUAIFENESIN 10; 100 MG/5ML; MG/5ML
1-2 SOLUTION ORAL EVERY 4 HOURS PRN
Qty: 120 ML | Refills: 1 | Status: SHIPPED | OUTPATIENT
Start: 2020-01-31 | End: 2020-02-18

## 2020-01-31 ASSESSMENT — MIFFLIN-ST. JEOR: SCORE: 1397.28

## 2020-01-31 ASSESSMENT — PAIN SCALES - GENERAL: PAINLEVEL: SEVERE PAIN (6)

## 2020-01-31 NOTE — NURSING NOTE
"Chief Complaint   Patient presents with     Cough     x 1 week     Generalized Body Aches       Initial /81   Pulse 74   Temp 97.2  F (36.2  C) (Tympanic)   Resp 16   Ht 1.69 m (5' 6.54\")   Wt 76.2 kg (168 lb)   SpO2 94%   BMI 26.68 kg/m   Estimated body mass index is 26.68 kg/m  as calculated from the following:    Height as of this encounter: 1.69 m (5' 6.54\").    Weight as of this encounter: 76.2 kg (168 lb).  Medication Reconciliation: complete    Mari Sanz LPN  "

## 2020-01-31 NOTE — PATIENT INSTRUCTIONS
Patient Education     The Flu (Influenza)     The virus that causes the flu spreads through the air in droplets when someone who has the flu coughs, sneezes, laughs, or talks.   The flu (influenza) is an infection that affects your respiratory tract. This tract is made up of your mouth, nose, and lungs, and the passages between them. Unlike a cold, the flu can make you very ill. And it can lead to pneumonia, a serious lung infection. The flu can have serious complications and even cause death.  Who is at risk for the flu?  Anyone can get the flu. But you are more likely to become infected if you:    Have a weakened immune system    Work in a healthcare setting where you may be exposed to flu germs    Live or work with someone who has the flu    Haven t had an annual flu shot  How does the flu spread?  The flu is caused by a virus. The virus spreads through the air in droplets when someone who has the flu coughs, sneezes, laughs, or talks. You can become infected when you inhale these viruses directly. You can also become infected when you touch a surface on which the droplets have landed and then transfer the germs to your eyes, nose, or mouth. Touching used tissues, or sharing utensils, drinking glasses, or a toothbrush from an infected person can expose you to flu viruses, too.  What are the symptoms of the flu?  Flu symptoms tend to come on quickly and may last a few days to a few weeks. They include:    Fever usually higher than 100.4 F  (38 C) and chills    Sore throat and headache    Dry cough    Runny nose    Tiredness and weakness    Muscle aches  Who is at risk for flu complications?  For some people, the flu can be very serious. The risk for complications is greater for:    Children younger than age 5    Adults ages 65 and older    People with a chronic illness such as diabetes or heart, kidney, or lung disease    People who live in a nursing home or long-term care facility  How is the flu treated?  The  flu usually gets better after 7 days or so. In some cases, your healthcare provider may prescribe an antiviral medicine. This may help you get well a little sooner. For the medicine to help, you need to take it as soon as possible (ideally within 48 hours) after your symptoms start. If you develop pneumonia or other serious illness, you may need to stay in the hospital.  Easing flu symptoms    Drink lots of fluids such as water, juice, and warm soup. A good rule is to drink enough so that you urinate your normal amount.    Get plenty of rest.    Ask your healthcare provider what to take for fever and pain.    Call your provider if your fever is 100.4 F (38 C) or higher, or you become dizzy, lightheaded, or short of breath.  Taking steps to protect others    Wash your hands often, especially after coughing or sneezing. Or clean your hands with an alcohol-based hand  containing at least 60% alcohol.    Cough or sneeze into a tissue. Then throw the tissue away and wash your hands. If you don t have a tissue, cough and sneeze into your elbow.    Stay home until at least 24 hours after you no longer have a fever or chills. Be sure the fever isn t being hidden by fever-reducing medicine.    Don t share food, utensils, drinking glasses, or a toothbrush with others.    Ask your healthcare provider if others in your household should get antiviral medicine to help them preventinfection.  How can the flu be prevented?    One of the best ways to prevent the flu is to get a flu vaccine each year. The CDC recommends that all people 6 months of age and older get a flu vaccine every year. The virus that causes the flu changes from year to year. For that reason, healthcare providers advise getting the flu vaccine each year as soon as it's available in your area. The vaccine is usually given as a shot, which is usually the first choice of experts. Other forms like a nasal spray or needle-free vaccine are available for some  people. Your healthcare provider can tell you which vaccine is right for you.    Wash your hands often. Frequent handwashing is a proven way to help prevent infection.    Carry an alcohol-based hand gel containing at least 60% alcohol. Use it when you can't use soap and water. Then wash your hands as soon as you can.    Try not to touch your eyes, nose, or mouth.    At home and work, clean phones, computer keyboards, and toys often with disinfectant wipes.    If possible, don't have close contact with others who have the flu or symptoms of the flu.  Handwashing tips  Handwashing is one of the best ways to prevent many common infections. If you are caring for or visiting someone with the flu, wash your hands each time you enter and leave the room. Follow these steps:    Use warm water and plenty of soap. Rub your hands together well.    Clean the whole hand, including under your nails, between your fingers, and up the wrists.    Wash for at least 15 seconds.    Rinse, letting the water run down your fingers, not up your wrists.    Dry your hands well. Use a paper towel to turn off the faucet and open the door.  Using alcohol-based hand   Alcohol-based hand  are also a good choice. Use them when you can't use soap and water. Follow these steps:    Squeeze about a tablespoon of gel into the palm of one hand.    Rub your hands together briskly, cleaning the backs of your hands, the palms, between your fingers, and up the wrists.    Rub until the gel is gone and your hands are completely dry.  Preventing the flu in healthcare settings  The flu is a special concern for people in hospitals and long-term care facilities. To help prevent the spread of flu, many hospitals and nursing homes take these steps:    Healthcare providers wash their hands or use an alcohol-based hand  before and after treating each patient.    People with the flu have private rooms and bathrooms or share a room with someone  with the same infection.    People who are at high risk for the flu but don't have it are encouraged to get the flu and pneumonia vaccines.    All healthcare workers are encouraged or required to get flu shots.  Date Last Reviewed: 12/1/2016 2000-2019 [employer]. [address] [place] [id number]. All rights reserved. This information is not intended as a substitute for professional medical care. Always follow your healthcare professional's instructions.

## 2020-01-31 NOTE — PROGRESS NOTES
"Nursing Notes:   Mari Sanz LPN  1/31/2020  1:22 PM  Sign at exiting of workspace  Chief Complaint   Patient presents with     Cough     x 1 week     Generalized Body Aches       Initial /81   Pulse 74   Temp 97.2  F (36.2  C) (Tympanic)   Resp 16   Ht 1.69 m (5' 6.54\")   Wt 76.2 kg (168 lb)   SpO2 94%   BMI 26.68 kg/m    Estimated body mass index is 26.68 kg/m  as calculated from the following:    Height as of this encounter: 1.69 m (5' 6.54\").    Weight as of this encounter: 76.2 kg (168 lb).  Medication Reconciliation: complete    Mari Sanz LPN    SUBJECTIVE:  Alejandra Villegas is a 54 year old female who complains of a cough Cough (x 1 week) and Generalized Body Aches  Smokes about 1/2 PPD  Duration: 7 days  Severity: moderate  Modifiers: Using finn seltzer cold and flu  Continues to smoke.   Body aches and chills at onset were severe but better and symptoms suggest influenza.   Now stuffy nose and harsh cough persist.   Using her inhaler and nebs if needed but no wheezing or SOB>    Current Outpatient Medications   Medication Sig Dispense Refill     albuterol (PROAIR HFA/PROVENTIL HFA/VENTOLIN HFA) 108 (90 Base) MCG/ACT Inhaler Inhale 2 puffs into the lungs every 4 hours as needed 1 Inhaler 11     albuterol (PROVENTIL) (2.5 MG/3ML) 0.083% neb solution Take 1 vial (2.5 mg) by nebulization every 6 hours as needed for shortness of breath / dyspnea or wheezing 30 vial 0     atorvastatin (LIPITOR) 40 MG tablet TAKE 1 TABLET(40 MG) BY MOUTH AT BEDTIME 90 tablet 0     gabapentin (NEURONTIN) 400 MG capsule Take 2 capsules (800 mg) by mouth 2 times daily 360 capsule 4     ibuprofen (ADVIL/MOTRIN) 800 MG tablet TAKE 1 TABLET BY MOUTH THREE TIMES DAILY AS NEEDED 30 tablet 0     lisinopril (PRINIVIL/ZESTRIL) 10 MG tablet TAKE 1 TABLET(10 MG) BY MOUTH DAILY 90 tablet 2     metFORMIN (GLUCOPHAGE) 500 MG tablet TAKE 1 TABLET(500 MG) BY MOUTH EVERY DAY WITH BREAKFAST 90 tablet 1     order for DME " Equipment being ordered: home neb set up with mask and tubing 1 Device 0     PARoxetine (PAXIL) 20 MG tablet Take 1 tablet (20 mg) by mouth every morning 90 tablet 3        Allergies   Allergen Reactions     Adhesive Tape      Bee Pollen Swelling     Seasonal     Codeine      Other reaction(s): Headache  Tylenol #3     Folic Acid Itching     Pollen Extract      Seasonal     Amoxicillin Rash     Liquid Adhesive Rash     Nabumetone GI Disturbance     GI upset       Social History     Socioeconomic History     Marital status:      Spouse name: None     Number of children: None     Years of education: None     Highest education level: None   Occupational History     None   Social Needs     Financial resource strain: None     Food insecurity:     Worry: None     Inability: None     Transportation needs:     Medical: None     Non-medical: None   Tobacco Use     Smoking status: Current Every Day Smoker     Packs/day: 0.50     Years: 32.00     Pack years: 16.00     Types: Cigarettes     Smokeless tobacco: Never Used   Substance and Sexual Activity     Alcohol use: No     Alcohol/week: 0.0 standard drinks     Drug use: No     Comment: Drug use: No     Sexual activity: Not Currently   Lifestyle     Physical activity:     Days per week: None     Minutes per session: None     Stress: None   Relationships     Social connections:     Talks on phone: None     Gets together: None     Attends [employer] service: None     Active member of club or organization: None     Attends meetings of clubs or organizations: None     Relationship status: None     Intimate partner violence:     Fear of current or ex partner: None     Emotionally abused: None     Physically abused: None     Forced sexual activity: None   Other Topics Concern     Parent/sibling w/ CABG, MI or angioplasty before 65F 55M? Not Asked   Social History Narrative     four times and now . She is unemployed.  Lives with her oldest son(he lives with  "her).  4 sons, 1 son lives with her others are all in the area.        OBJECTIVE:  /81   Pulse 74   Temp 97.2  F (36.2  C) (Tympanic)   Resp 16   Ht 1.69 m (5' 6.54\")   Wt 76.2 kg (168 lb)   SpO2 94%   BMI 26.68 kg/m    General appearance: alert, cooperative and pleasant. Appears older than stated age.  Nasally congested.   Ear exam: External ears normal. Canals clear. TM's normal.  Nose: Nares normal  Sinuses: not tender  Oropharynx: moist and pink  Neck: supple, non-tender, free range of motion, no adenopathy  Lungs: clear to auscultation      ASSESSMENT/PLAN:  1. Cough    2. Influenza-like illness          Plan:  Symptomatic therapy suggested: use fluids, rest and over the counter decongestants prn.  Cough med with codeine provided.   Call or return to clinic prn if these symptoms worsen or fail toimprove as anticipated.      Radha Mckinley MD ........... 1:25 PM 1/31/2020       "

## 2020-02-03 DIAGNOSIS — M51.379 DEGENERATIVE DISC DISEASE AT L5-S1 LEVEL: Primary | ICD-10-CM

## 2020-02-05 NOTE — TELEPHONE ENCOUNTER
[facility] sent Rx request for the following:      IBUPROFEN 800MG TABLETS  Sig: TAKE 1 TABLET BY MOUTH THREE TIMES DAILY AS NEEDED  Last Prescription Date:   12/27/19  Last Fill Qty/Refills:         30, R-0    Last Office Visit:              6/7/19  Future Office visit:           None.  Routing refill request to provider for review/approval because:  NSAID Medications Failed2/5 11:04 AM   Normal ALT on file in past 12 months    Normal AST on file in past 12 months    Normal CBC on file in past 12 months    Normal serum creatinine on file in past 12 months     In clinical absence of patient's primary, Dipak Welsh, patient is requesting that this message be sent to the primary provider's Teamlet for consideration please.  Dr. Welsh scheduled to return to return on 2/13/20.    Unable to complete prescription refill per RN Medication Refill Policy. Consuelo Riley RN .............. 2/5/2020  12:26 PM

## 2020-02-06 RX ORDER — IBUPROFEN 800 MG/1
TABLET, FILM COATED ORAL
Qty: 30 TABLET | Refills: 0 | Status: SHIPPED | OUTPATIENT
Start: 2020-02-06 | End: 2020-03-10

## 2020-02-15 DIAGNOSIS — E78.5 DYSLIPIDEMIA: Primary | ICD-10-CM

## 2020-02-17 RX ORDER — ATORVASTATIN CALCIUM 40 MG/1
TABLET, FILM COATED ORAL
Qty: 90 TABLET | Refills: 0 | Status: SHIPPED | OUTPATIENT
Start: 2020-02-17 | End: 2020-03-24

## 2020-02-17 NOTE — TELEPHONE ENCOUNTER
[facility] sent Rx request for the following:      ATORVASTATIN 40MG TABLETS  Sig: TAKE 1 TABLET(40 MG) BY MOUTH AT BEDTIME  Last Prescription Date:   11/12/19  Last Fill Qty/Refills:         90, R-0    Last Office Visit:              6/7/19  Future Office visit:           None.  Routing refill request to provider for review/approval because:  Statins Protocol Failed2/17 9:38 AM   LDL on file in past 12 months     Unable to complete prescription refill per RN Medication Refill Policy. Consuelo Riley RN .............. 2/17/2020  10:34 AM

## 2020-02-18 ENCOUNTER — OFFICE VISIT (OUTPATIENT)
Dept: FAMILY MEDICINE | Facility: OTHER | Age: 55
End: 2020-02-18
Attending: PHYSICIAN ASSISTANT
Payer: MEDICARE

## 2020-02-18 VITALS
RESPIRATION RATE: 18 BRPM | WEIGHT: 166 LBS | HEART RATE: 79 BPM | SYSTOLIC BLOOD PRESSURE: 123 MMHG | HEIGHT: 68 IN | TEMPERATURE: 97.7 F | OXYGEN SATURATION: 95 % | DIASTOLIC BLOOD PRESSURE: 81 MMHG | BODY MASS INDEX: 25.16 KG/M2

## 2020-02-18 DIAGNOSIS — N90.9 IRRITATION OF EXTERNAL FEMALE GENITALIA: Primary | ICD-10-CM

## 2020-02-18 DIAGNOSIS — N95.2 VAGINAL ATROPHY: ICD-10-CM

## 2020-02-18 LAB
SPECIMEN SOURCE: NORMAL
WET PREP SPEC: NORMAL

## 2020-02-18 PROCEDURE — 99213 OFFICE O/P EST LOW 20 MIN: CPT | Performed by: FAMILY MEDICINE

## 2020-02-18 PROCEDURE — G0463 HOSPITAL OUTPT CLINIC VISIT: HCPCS

## 2020-02-18 PROCEDURE — 87210 SMEAR WET MOUNT SALINE/INK: CPT | Mod: ZL | Performed by: FAMILY MEDICINE

## 2020-02-18 ASSESSMENT — PAIN SCALES - GENERAL: PAINLEVEL: MODERATE PAIN (4)

## 2020-02-18 ASSESSMENT — MIFFLIN-ST. JEOR: SCORE: 1410.09

## 2020-02-18 NOTE — PROGRESS NOTES
"Nursing Notes:   Mari Sanz LPN  2/18/2020  2:09 PM  Sign at exiting of workspace  Chief Complaint   Patient presents with     Vaginal Problem     buring on the outside with redness and rash       Initial /81   Pulse 79   Temp 97.7  F (36.5  C) (Tympanic)   Resp 18   Ht 1.725 m (5' 7.91\")   Wt 75.3 kg (166 lb)   SpO2 95%   BMI 25.30 kg/m    Estimated body mass index is 25.3 kg/m  as calculated from the following:    Height as of this encounter: 1.725 m (5' 7.91\").    Weight as of this encounter: 75.3 kg (166 lb).  Medication Reconciliation: complete    Mari Sanz LPN    SUBJECTIVE:   54 year old female complains of no vaginal discharge but had intercourse on 2/14/2020 and the next day had irritation, itching and burning.  Postmenopausal.   Denies abnormal vaginal bleeding or significant pelvic pain orfever. No UTI symptoms. Denies history of known exposure to STD. Denies dyspareunia.  No over the counter topicals used.   Vaginal dryness reported. Does not use lubricant and not on estrogen.   Had hysterectomy with oophorectomy 2010.   Area externally that hurt was painful right after sex.     No LMP recorded. Patient is postmenopausal.    OBJECTIVE:   Vital signs:  Temp: 97.7  F (36.5  C) Temp src: Tympanic BP: 123/81 Pulse: 79   Resp: 18 SpO2: 95 %     Height: 172.5 cm (5' 7.91\") Weight: 75.3 kg (166 lb)  Estimated body mass index is 25.3 kg/m  as calculated from the following:    Height as of this encounter: 1.725 m (5' 7.91\").    Weight as of this encounter: 75.3 kg (166 lb).  She appears well, afebrile.  Abdomen: benign, soft, nontender, no masses.  Pelvic Exam:   Labia and external atrophic changes.  Shave pubic hair.  Abrasions of labia majora bilaterally and symmetrically.   vaginal discharge described as scant, white, cervix absent, mucosa normal.    Results for orders placed or performed in visit on 02/18/20   Wet Prep, Genital     Status: None   Result Value Ref Range    Specimen " Description Vagina     Wet Prep No yeast seen     Wet Prep No clue cells seen     Wet Prep No Trichomonas seen      I have personally reviewed the labs listed above.    ASSESSMENT:   1. Irritation of external female genitalia    2. Vaginal atrophy          PLAN:   Reassurance. No evidence of sexually transmitted disease.  Treat sores with over the counter neosporin pain ointment.  Need lubrication with intercourse and brands to use discussed.  Radha Mckinley MD  4:59 PM 2/18/2020

## 2020-02-18 NOTE — NURSING NOTE
"Chief Complaint   Patient presents with     Vaginal Problem     buring on the outside with redness and rash       Initial /81   Pulse 79   Temp 97.7  F (36.5  C) (Tympanic)   Resp 18   Ht 1.725 m (5' 7.91\")   Wt 75.3 kg (166 lb)   SpO2 95%   BMI 25.30 kg/m   Estimated body mass index is 25.3 kg/m  as calculated from the following:    Height as of this encounter: 1.725 m (5' 7.91\").    Weight as of this encounter: 75.3 kg (166 lb).  Medication Reconciliation: complete    Mari Sanz LPN  "

## 2020-02-18 NOTE — PATIENT INSTRUCTIONS
Patient Education     Atrophic Vaginitis    Atrophic vaginitis means the walls of your vagina have become thin. This happens when your body makes too little of the hormone estrogen. Menopause or surgical removal of the ovaries are the most common causes for a drop in estrogen. Breastfeeding can also cause the hormone level to drop.  Symptoms of atrophic vaginitis include:    Dryness, soreness, burning, or itching in the vagina    Vaginal discharge  Sex can be uncomfortable, even painful. After sex, you may have bleeding from your vagina. You may also have burning or pain when you urinate.  Home care  Your healthcare provider may recommend 1 or more of these as treatment:    Vaginal creams, lotions, and lubricants. These products help relieve vaginal dryness. They don t need a prescription. They can be found in the personal care section of most pharmacies. Creams and lotions are used daily to help keep the vagina moist. Lubricants help reduce dryness and pain during sex. Choose water-based lubricants. Don t use petroleum jelly, mineral oil, or other oils. These increase the chance of infection.     Hormone therapy (HT). HT increases the amount of estrogen in your body. This can help manage or relieve symptoms. HT can be given in pill form. It may be given as a lotion, cream, ring put into the vagina, or a patch on the skin. The risks and benefits of HT vary for each woman. For instance, your risk may be higher if you have had breast cancer. Discuss this treatment with your healthcare provider. Not every woman can use HT.  You don t need to give up (abstain from) sex. In fact, regular sex can help keep vaginal tissues healthy. Take steps to make sex more comfortable by using water-based lubricants.  Preventing infections  Atrophic vaginitis makes an infection of the vagina or the urinary tract more likely. To help reduce your risk:    Keep your genitals clean. When you bathe, wash the outside of your vagina with  mild soap and water. Clean gently between the folds of your vagina.    Wipe from front to back after a bowel movement.    Don t douche unless your healthcare provider tells you to.    Avoid scented toilet paper, scented vaginal sprays, and scented tampons.    Avoid wearing clothes that are tight in the crotch. These include pantyhose, jeans, and leggings. Wear cotton underwear. Change it every day.  Follow-up care  Follow up with your healthcare provider, or as advised.  When to seek medical advice  Call your health care provider right away if any of these occur:    Fever of 100.4 F (38 C) or higher, or as directed by your healthcare provider    Symptoms don t go away or get worse even with treatment    Vaginal area swells or becomes painful    Vaginal area bleeds, but not because of your period    Bad-smelling discharge from the vagina    Pain or burning when you urinate or you have trouble passing urine    Open sores develop around vagina   Date Last Reviewed: 12/1/2016 2000-2019 [employer]. [address] [place] [address], [place] [address]. All rights reserved. This information is not intended as a substitute for professional medical care. Always follow your healthcare professional's instructions.

## 2020-03-06 DIAGNOSIS — M51.379 DEGENERATIVE DISC DISEASE AT L5-S1 LEVEL: Primary | ICD-10-CM

## 2020-03-09 NOTE — TELEPHONE ENCOUNTER
[id number] in [place] sent Rx request for the following:      IBUPROFEN 800MG TABLETS      Sig: TAKE 1 TABLET BY MOUTH THREE TIMES DAILY AS NEEDED    Last Prescription Date:   2/6/20  Last Fill Qty/Refills:         30, R-0    Last Office Visit:              6/7/19   Future Office visit:           None.  Routing refill request to provider for review/approval because:  NSAID Medications [id number]/09/2020 04:04 PM   Normal ALT on file in past 12 months    Normal AST on file in past 12 months    Normal CBC on file in past 12 months    Normal serum creatinine on file in past 12 months     Unable to complete prescription refill per RN Medication Refill Policy. Consuelo Riley RN .............. 3/10/2020  8:16 AM

## 2020-03-10 RX ORDER — IBUPROFEN 800 MG/1
TABLET, FILM COATED ORAL
Qty: 90 TABLET | Refills: 0 | Status: SHIPPED | OUTPATIENT
Start: 2020-03-10 | End: 2020-04-06

## 2020-03-24 ENCOUNTER — OFFICE VISIT (OUTPATIENT)
Dept: FAMILY MEDICINE | Facility: OTHER | Age: 55
End: 2020-03-24
Attending: FAMILY MEDICINE
Payer: MEDICARE

## 2020-03-24 VITALS
OXYGEN SATURATION: 92 % | WEIGHT: 174.2 LBS | RESPIRATION RATE: 19 BRPM | TEMPERATURE: 97.7 F | BODY MASS INDEX: 26.55 KG/M2 | DIASTOLIC BLOOD PRESSURE: 86 MMHG | SYSTOLIC BLOOD PRESSURE: 132 MMHG | HEART RATE: 82 BPM

## 2020-03-24 DIAGNOSIS — M51.379 DEGENERATIVE DISC DISEASE AT L5-S1 LEVEL: ICD-10-CM

## 2020-03-24 DIAGNOSIS — E11.9 CONTROLLED TYPE 2 DIABETES MELLITUS WITHOUT COMPLICATION, WITHOUT LONG-TERM CURRENT USE OF INSULIN (H): Primary | ICD-10-CM

## 2020-03-24 DIAGNOSIS — J45.30 MILD PERSISTENT ASTHMA WITHOUT COMPLICATION: ICD-10-CM

## 2020-03-24 DIAGNOSIS — Z87.891 PERSONAL HISTORY OF TOBACCO USE, PRESENTING HAZARDS TO HEALTH: ICD-10-CM

## 2020-03-24 DIAGNOSIS — I10 ESSENTIAL HYPERTENSION: ICD-10-CM

## 2020-03-24 DIAGNOSIS — E78.5 DYSLIPIDEMIA: ICD-10-CM

## 2020-03-24 LAB
ANION GAP SERPL CALCULATED.3IONS-SCNC: 8 MMOL/L (ref 3–14)
BUN SERPL-MCNC: 17 MG/DL (ref 7–25)
CALCIUM SERPL-MCNC: 9.5 MG/DL (ref 8.6–10.3)
CHLORIDE SERPL-SCNC: 103 MMOL/L (ref 98–107)
CHOLEST SERPL-MCNC: 167 MG/DL
CO2 SERPL-SCNC: 27 MMOL/L (ref 21–31)
CREAT SERPL-MCNC: 0.88 MG/DL (ref 0.6–1.2)
ERYTHROCYTE [DISTWIDTH] IN BLOOD BY AUTOMATED COUNT: 12.7 % (ref 10–15)
GFR SERPL CREATININE-BSD FRML MDRD: 67 ML/MIN/{1.73_M2}
GLUCOSE SERPL-MCNC: 104 MG/DL (ref 70–105)
HBA1C MFR BLD: 5.8 % (ref 4–6)
HCT VFR BLD AUTO: 41.1 % (ref 35–47)
HDLC SERPL-MCNC: 48 MG/DL (ref 23–92)
HGB BLD-MCNC: 14.2 G/DL (ref 11.7–15.7)
LDLC SERPL CALC-MCNC: 89 MG/DL
MCH RBC QN AUTO: 32.3 PG (ref 26.5–33)
MCHC RBC AUTO-ENTMCNC: 34.5 G/DL (ref 31.5–36.5)
MCV RBC AUTO: 94 FL (ref 78–100)
NONHDLC SERPL-MCNC: 119 MG/DL
PLATELET # BLD AUTO: 333 10E9/L (ref 150–450)
POTASSIUM SERPL-SCNC: 3.6 MMOL/L (ref 3.5–5.1)
RBC # BLD AUTO: 4.39 10E12/L (ref 3.8–5.2)
SODIUM SERPL-SCNC: 138 MMOL/L (ref 134–144)
TRIGL SERPL-MCNC: 149 MG/DL
WBC # BLD AUTO: 8.2 10E9/L (ref 4–11)

## 2020-03-24 PROCEDURE — G0463 HOSPITAL OUTPT CLINIC VISIT: HCPCS

## 2020-03-24 PROCEDURE — 36415 COLL VENOUS BLD VENIPUNCTURE: CPT | Mod: ZL | Performed by: FAMILY MEDICINE

## 2020-03-24 PROCEDURE — 99213 OFFICE O/P EST LOW 20 MIN: CPT | Performed by: FAMILY MEDICINE

## 2020-03-24 PROCEDURE — 80061 LIPID PANEL: CPT | Mod: ZL | Performed by: FAMILY MEDICINE

## 2020-03-24 PROCEDURE — 82043 UR ALBUMIN QUANTITATIVE: CPT | Mod: ZL | Performed by: FAMILY MEDICINE

## 2020-03-24 PROCEDURE — 83036 HEMOGLOBIN GLYCOSYLATED A1C: CPT | Mod: ZL | Performed by: FAMILY MEDICINE

## 2020-03-24 PROCEDURE — 85027 COMPLETE CBC AUTOMATED: CPT | Mod: ZL | Performed by: FAMILY MEDICINE

## 2020-03-24 PROCEDURE — 80048 BASIC METABOLIC PNL TOTAL CA: CPT | Mod: ZL | Performed by: FAMILY MEDICINE

## 2020-03-24 RX ORDER — ATORVASTATIN CALCIUM 40 MG/1
40 TABLET, FILM COATED ORAL AT BEDTIME
Qty: 90 TABLET | Refills: 4 | Status: SHIPPED | OUTPATIENT
Start: 2020-03-24 | End: 2022-03-31

## 2020-03-24 RX ORDER — GABAPENTIN 400 MG/1
800 CAPSULE ORAL 2 TIMES DAILY
Qty: 360 CAPSULE | Refills: 4 | Status: SHIPPED | OUTPATIENT
Start: 2020-03-24 | End: 2021-03-26

## 2020-03-24 RX ORDER — ALBUTEROL SULFATE 0.83 MG/ML
2.5 SOLUTION RESPIRATORY (INHALATION) EVERY 6 HOURS PRN
Qty: 30 VIAL | Refills: 4 | Status: SHIPPED | OUTPATIENT
Start: 2020-03-24 | End: 2020-11-03

## 2020-03-24 RX ORDER — ALBUTEROL SULFATE 90 UG/1
2 AEROSOL, METERED RESPIRATORY (INHALATION) EVERY 4 HOURS PRN
Qty: 1 INHALER | Refills: 11 | Status: SHIPPED | OUTPATIENT
Start: 2020-03-24 | End: 2020-11-03

## 2020-03-24 RX ORDER — NICOTINE 21 MG/24HR
1 PATCH, TRANSDERMAL 24 HOURS TRANSDERMAL EVERY 24 HOURS
Qty: 30 PATCH | Refills: 1 | Status: SHIPPED | OUTPATIENT
Start: 2020-03-24 | End: 2022-03-30

## 2020-03-24 RX ORDER — LISINOPRIL 10 MG/1
10 TABLET ORAL DAILY
Qty: 90 TABLET | Refills: 4 | Status: SHIPPED | OUTPATIENT
Start: 2020-03-24 | End: 2020-11-03

## 2020-03-24 NOTE — NURSING NOTE
Pt presents to clinic today for medication management.      Medication Reconciliation: complete  Madison Rodríguez LPN

## 2020-03-24 NOTE — PROGRESS NOTES
"Nursing Notes:   Madison Rodríguez, LPN  3/24/2020  4:01 PM  Signed  Pt presents to clinic today for medication management.      Medication Reconciliation: complete  Madison Rodríguez LPN     SUBJECTIVE:  54 year old female presents for follow up on multiple medical issues.    She has stable diabetes, A1c has been controlled with current metformin dose. A1c last 2 checks has been 5.8%.    Blood pressure controlled with lisinopril.     On stable gabapentin dosing for chronic low back pain. History of violated controlled substance agreement and she has not been on opioids for several years. She asks for some pain medication today, because \"I have not had any for a while.\"    Has asthma and continues smoking. Is interested in quitting.       REVIEW OF SYSTEMS:    Constitutional: negative  Respiratory: negative  Cardiovascular: negative  Gastrointestinal: negative    Current Outpatient Medications   Medication Sig Dispense Refill     albuterol (PROAIR HFA/PROVENTIL HFA/VENTOLIN HFA) 108 (90 Base) MCG/ACT Inhaler Inhale 2 puffs into the lungs every 4 hours as needed 1 Inhaler 11     albuterol (PROVENTIL) (2.5 MG/3ML) 0.083% neb solution Take 1 vial (2.5 mg) by nebulization every 6 hours as needed for shortness of breath / dyspnea or wheezing 30 vial 0     atorvastatin (LIPITOR) 40 MG tablet TAKE 1 TABLET(40 MG) BY MOUTH AT BEDTIME 90 tablet 0     gabapentin (NEURONTIN) 400 MG capsule Take 2 capsules (800 mg) by mouth 2 times daily 360 capsule 4     ibuprofen (ADVIL/MOTRIN) 800 MG tablet TAKE 1 TABLET BY MOUTH THREE TIMES DAILY AS NEEDED 90 tablet 0     lisinopril (PRINIVIL/ZESTRIL) 10 MG tablet TAKE 1 TABLET(10 MG) BY MOUTH DAILY 90 tablet 2     metFORMIN (GLUCOPHAGE) 500 MG tablet TAKE 1 TABLET(500 MG) BY MOUTH EVERY DAY WITH BREAKFAST 90 tablet 1     order for DME Equipment being ordered: home neb set up with mask and tubing 1 Device 0     PARoxetine (PAXIL) 20 MG tablet Take 1 tablet (20 mg) by mouth every morning 90 " tablet 3     Allergies   Allergen Reactions     Adhesive Tape      Bee Pollen Swelling     Seasonal     Codeine      Other reaction(s): Headache  Tylenol #3     Folic Acid Itching     Pollen Extract      Seasonal     Amoxicillin Rash     Liquid Adhesive Rash     Nabumetone GI Disturbance     GI upset       OBJECTIVE:  /86   Pulse 82   Temp 97.7  F (36.5  C) (Tympanic)   Resp 19   Wt 79 kg (174 lb 3.2 oz)   SpO2 92%   BMI 26.55 kg/m      EXAM:  General Appearance: Alert. No acute distress  Chest/Respiratory Exam: Clear to auscultation bilaterally  Cardiovascular Exam: Regular rate and rhythm. S1, S2, no murmur, gallop, or rubs.  Extremities: No lower extremity edema.  Foot Exam: Left and right foot: monofilament sensation normal, good pedal pulses, no lesions, nail hygiene good.  Psychiatric: Normal affect and mentation    Results for orders placed or performed in visit on 03/24/20   CBC with platelets     Status: None   Result Value Ref Range    WBC 8.2 4.0 - 11.0 10e9/L    RBC Count 4.39 3.8 - 5.2 10e12/L    Hemoglobin 14.2 11.7 - 15.7 g/dL    Hematocrit 41.1 35.0 - 47.0 %    MCV 94 78 - 100 fl    MCH 32.3 26.5 - 33.0 pg    MCHC 34.5 31.5 - 36.5 g/dL    RDW 12.7 10.0 - 15.0 %    Platelet Count 333 150 - 450 10e9/L   Basic metabolic panel     Status: None   Result Value Ref Range    Sodium 138 134 - 144 mmol/L    Potassium 3.6 3.5 - 5.1 mmol/L    Chloride 103 98 - 107 mmol/L    Carbon Dioxide 27 21 - 31 mmol/L    Anion Gap 8 3 - 14 mmol/L    Glucose 104 70 - 105 mg/dL    Urea Nitrogen 17 7 - 25 mg/dL    Creatinine 0.88 0.60 - 1.20 mg/dL    GFR Estimate 67 >60 mL/min/[1.73_m2]    GFR Estimate If Black 82 >60 mL/min/[1.73_m2]    Calcium 9.5 8.6 - 10.3 mg/dL   Lipid Profile     Status: None   Result Value Ref Range    Cholesterol 167 <200 mg/dL    Triglycerides 149 <150 mg/dL    HDL Cholesterol 48 23 - 92 mg/dL    LDL Cholesterol Calculated 89 <100 mg/dL    Non HDL Cholesterol 119 <130 mg/dL   Albumin  Random Urine Quantitative with Creat Ratio     Status: None   Result Value Ref Range    Creatinine Urine 23 mg/dL    Albumin Urine mg/L <5 mg/L    Albumin Urine mg/g Cr Unable to calculate due to low value 0 - 25 mg/g Cr   Hemoglobin A1c     Status: None   Result Value Ref Range    Hemoglobin A1C 5.8 4.0 - 6.0 %        ASSESSMENT/PLAN:    ICD-10-CM    1. Controlled type 2 diabetes mellitus without complication, without long-term current use of insulin (H)  E11.9 metFORMIN (GLUCOPHAGE) 500 MG tablet     Hemoglobin A1c     Albumin Random Urine Quantitative with Creat Ratio     Lipid Profile     Basic metabolic panel     CBC with platelets     CBC with platelets     Basic metabolic panel     Lipid Profile     Albumin Random Urine Quantitative with Creat Ratio     Hemoglobin A1c   2. Essential hypertension  I10 lisinopril (ZESTRIL) 10 MG tablet   3. Dyslipidemia  E78.5 atorvastatin (LIPITOR) 40 MG tablet   4. Degenerative disc disease at L5-S1 level  M51.36 gabapentin (NEURONTIN) 400 MG capsule   5. Personal history of tobacco use, presenting hazards to health  Z87.891 nicotine (NICODERM CQ) 14 MG/24HR 24 hr patch     nicotine (NICODERM CQ) 7 MG/24HR 24 hr patch   6. Mild persistent asthma without complication  J45.30 albuterol (PROAIR HFA/PROVENTIL HFA/VENTOLIN HFA) 108 (90 Base) MCG/ACT inhaler     albuterol (PROVENTIL) (2.5 MG/3ML) 0.083% neb solution       Labs stable. Refilled medications for diabetes, hypertension, hyperlipidemia.     Gabapentin working for LBP. Declined her request for opioids.    Refilled albuterol. Needs to quit smoking. She is willing to try. Reduced to 1/2 ppd.     Reviewed available options for smoking cessation. Patient chose to use nicotine patches 14 mg x 2 and 7 mg x 1 month. Discussed appropriate use of medication. Performed tobacco cessation counseling for greater than 3 minutes.        Follow-up 1 year    Dipak Welsh MD    This document was prepared using a combination of typing  and voice generated software.  While every attempt was made for accuracy, spelling and grammatical errors may exist.

## 2020-03-24 NOTE — LETTER
March 30, 2020      Alejandra Villegas     [facility] [id number]        Dear ,    We are writing to inform you of your test results. All the labs look good. Blood counts, electrolytes, kidney tests are normal. Diabetes is well controlled. Cholesterol looks good. No excess protein leakage is noted in your urine.        Resulted Orders   CBC with platelets   Result Value Ref Range    WBC 8.2 4.0 - 11.0 10e9/L    RBC Count 4.39 3.8 - 5.2 10e12/L    Hemoglobin 14.2 11.7 - 15.7 g/dL    Hematocrit 41.1 35.0 - 47.0 %    MCV 94 78 - 100 fl    MCH 32.3 26.5 - 33.0 pg    MCHC 34.5 31.5 - 36.5 g/dL    RDW 12.7 10.0 - 15.0 %    Platelet Count 333 150 - 450 10e9/L   Basic metabolic panel   Result Value Ref Range    Sodium 138 134 - 144 mmol/L    Potassium 3.6 3.5 - 5.1 mmol/L    Chloride 103 98 - 107 mmol/L    Carbon Dioxide 27 21 - 31 mmol/L    Anion Gap 8 3 - 14 mmol/L    Glucose 104 70 - 105 mg/dL    Urea Nitrogen 17 7 - 25 mg/dL    Creatinine 0.88 0.60 - 1.20 mg/dL    GFR Estimate 67 >60 mL/min/[1.73_m2]    GFR Estimate If Black 82 >60 mL/min/[1.73_m2]    Calcium 9.5 8.6 - 10.3 mg/dL   Lipid Profile   Result Value Ref Range    Cholesterol 167 <200 mg/dL    Triglycerides 149 <150 mg/dL    HDL Cholesterol 48 23 - 92 mg/dL    LDL Cholesterol Calculated 89 <100 mg/dL      Comment:      Desirable:       <100 mg/dl    Non HDL Cholesterol 119 <130 mg/dL   Albumin Random Urine Quantitative with Creat Ratio   Result Value Ref Range    Creatinine Urine 23 mg/dL    Albumin Urine mg/L <5 mg/L    Albumin Urine mg/g Cr Unable to calculate due to low value 0 - 25 mg/g Cr   Hemoglobin A1c   Result Value Ref Range    Hemoglobin A1C 5.8 4.0 - 6.0 %       If you have any questions or concerns, please call the clinic at the number listed above.       Sincerely,        Dipak Welsh MD

## 2020-03-25 LAB
CREAT UR-MCNC: 23 MG/DL
MICROALBUMIN UR-MCNC: <5 MG/L
MICROALBUMIN/CREAT UR: NORMAL MG/G CR (ref 0–25)

## 2020-04-04 DIAGNOSIS — M51.379 DEGENERATIVE DISC DISEASE AT L5-S1 LEVEL: Primary | ICD-10-CM

## 2020-04-06 RX ORDER — IBUPROFEN 800 MG/1
TABLET, FILM COATED ORAL
Qty: 90 TABLET | Refills: 3 | Status: SHIPPED | OUTPATIENT
Start: 2020-04-06 | End: 2020-11-03

## 2020-04-06 NOTE — TELEPHONE ENCOUNTER
[facility] #[id number] sent Rx request for the following:      IBUPROFEN 800MG TABLETS      Sig: TAKE 1 TABLET BY MOUTH THREE TIMES DAILY AS NEEDED    Last Prescription Date:   3/10/20  Last Fill Qty/Refills:         #90, R-0    Last Office Visit:              3/24/20  Future Office visit:           None.  Routing refill request to provider for review/approval because:  NSAID Medications [id number]/06/2020 10:41 AM   Normal ALT on file in past 12 months    Normal AST on file in past 12 months     Unable to complete prescription refill per RN Medication Refill Policy. Consuelo Riley RN .............. 4/6/2020  10:43 AM

## 2020-05-12 DIAGNOSIS — E78.5 DYSLIPIDEMIA: ICD-10-CM

## 2020-05-13 RX ORDER — ATORVASTATIN CALCIUM 40 MG/1
TABLET, FILM COATED ORAL
Qty: 90 TABLET | Refills: 4 | OUTPATIENT
Start: 2020-05-13

## 2020-05-13 NOTE — TELEPHONE ENCOUNTER
Refill Request for: Atorvastatin (LIPITOR) 40 MG tablet    Received From: [facility]  Last Written Prescription Date: 3/24/2020 for 90 tablets and 4 refills; E-Prescribing Status: Receipt confirmed by pharmacy (3/24/2020  4:10 PM CDT) to [facility]  LOV: 3/24/2020 with PCP  Next Appointment: No upcoming office visit on file during initial refill review with PCP  Protocol: Statins Protocol Passed - 05/13/2020 07:47 AM    Prescription [place] per [facility] [place].      Samanta LOPEZN, RN on 5/13/2020 at 7:49 AM

## 2020-05-18 ENCOUNTER — OFFICE VISIT (OUTPATIENT)
Dept: FAMILY MEDICINE | Facility: OTHER | Age: 55
End: 2020-05-18
Attending: PHYSICIAN ASSISTANT
Payer: MEDICARE

## 2020-05-18 VITALS
BODY MASS INDEX: 26.86 KG/M2 | HEART RATE: 70 BPM | DIASTOLIC BLOOD PRESSURE: 72 MMHG | SYSTOLIC BLOOD PRESSURE: 110 MMHG | OXYGEN SATURATION: 93 % | TEMPERATURE: 97.9 F | RESPIRATION RATE: 24 BRPM | WEIGHT: 176.2 LBS

## 2020-05-18 DIAGNOSIS — K21.9 GASTROESOPHAGEAL REFLUX DISEASE, ESOPHAGITIS PRESENCE NOT SPECIFIED: Primary | ICD-10-CM

## 2020-05-18 PROCEDURE — 99214 OFFICE O/P EST MOD 30 MIN: CPT | Performed by: PHYSICIAN ASSISTANT

## 2020-05-18 PROCEDURE — G0463 HOSPITAL OUTPT CLINIC VISIT: HCPCS

## 2020-05-18 ASSESSMENT — PAIN SCALES - GENERAL: PAINLEVEL: NO PAIN (0)

## 2020-05-18 NOTE — NURSING NOTE
"Chief Complaint   Patient presents with     Gastrophageal Reflux     ongoing issue   Patient presents with acid reflux. States she has been taking Omeprazole otc.    Initial /72   Pulse 70   Temp 97.9  F (36.6  C) (Tympanic)   Resp 24   Wt 79.9 kg (176 lb 3.2 oz)   SpO2 93%   BMI 26.86 kg/m   Estimated body mass index is 26.86 kg/m  as calculated from the following:    Height as of 2/18/20: 1.725 m (5' 7.91\").    Weight as of this encounter: 79.9 kg (176 lb 3.2 oz).  Medication Reconciliation: complete    Karely Cotto LPN  "

## 2020-05-18 NOTE — PATIENT INSTRUCTIONS
Esophageal reflux symptoms, course gradually worsening  Discussed decreased use of gastric irritants: she will switch NSAID use to tylenol 1000 gm 3 times daily, decreased caffeine use, she will continue to work on decreased tobacco use  Avoid spicy foods, fatty foods see handout  Small frequent meals over larger meals, avoid eating 2-3 hours before bedtime.   Start omeprazole 20 mg once daily - take 20-30 minutes before breakfast  Follow up with PCP for a recheck in 6 weeks, sooner for worsening or no improvement of symtpoms    Patient Education     GERD (Adult)    The esophagus is a tube that carries food from the mouth to the stomach. A valve (the LES, lower esophageal sphincter) at the lower end of the esophagus prevents stomach acid from flowing upward. When this valve doesn't work properly, stomach contents may repeatedly flow back up (reflux) into the esophagus. This is called gastroesophageal reflux disease (GERD). GERD can irritate the esophagus. It can cause problems with pain, swallowing or breathing. In severe cases, GERD can cause recurrent pneumonia (from aspiration or breathing in particles) or other serious problems.  Symptoms of reflux include burning, pressure or sharp pain in the upper abdomen or mid to lower chest. The pain can spread to the neck, back, or shoulder. There may be belching, an acid taste in the back of the throat, chronic cough, or sore throat, or hoarseness. GERD symptoms often occur during the day after a big meal. They can also occur at night when lying down.   Home care  Lifestyle changes can help reduce symptoms. If needed, your healthcare provider may prescribe medicines. Symptoms often improve with treatment, but if treatment is stopped, the symptoms often return after a few months. So most persons with GERD will need to continue treatment or get treatment on and off.  Lifestyle changes    Limit or avoid fatty, fried, and spicy foods, as well as coffee, chocolate, mint, and  "foods with high acid content such as tomatoes and citrus fruit and juices (orange, grapefruit, lemon).    Don t eat large meals, especially at night. Frequent, smaller meals are best. Don't lie down right after eating. And don t eat anything 3 hours before going to bed.    Don't drink alcohol or smoke. As much as possible, stay away from second hand smoke.    If you are overweight, losing weight will reduce symptoms.     Don't wear tight clothing around your stomach area.    If your symptoms occur during sleep, use a foam wedge to elevate your upper body (not just your head.) Or, place 4\" blocks under the head of your bed. Or use 2 bed risers under your bedframe.  Medicines  If needed, medicines can help relieve the symptoms of GERD and prevent damage to the esophagus. Discuss a medicine plan with your healthcare provider. This may include one or more of the following medicines:    Antacids to help neutralize the normal acids in your stomach.    Acid blockers (Histamine or H2 blockers) to decrease acid production.    Acid inhibitors (proton pump inhibitors PPIs) to decrease acid production in a different way than the blockers. They may work better, but can take a little longer to take effect.  Take an antacid 30 to 60 minutes after eating and at bedtime, but not at the same time as an acid blocker.  Try not to take medicines such as ibuprofen and aspirin. If you are taking aspirin for your heart or other medical reasons, talk to your healthcare provider about stopping it.  Follow-up care  Follow up with your healthcare provider or as advised by our staff.  When to seek medical advice  Call your healthcare provider if any of the following occur:    Stomach pain gets worse or moves to the lower right abdomen (appendix area)    Chest pain appears or gets worse, or spreads to the back, neck, shoulder, or arm    An over-the-counter trial of medicine doesn't relieve your symptoms    Weight loss that can't be " explained    Trouble or pain swallowing    Frequent vomiting (can t keep down liquids)    Blood in the stool or vomit (red or black in color)    Feeling weak or dizzy    Fever of 100.4 F (38 C) or higher, or as directed by your healthcare provider  Date Last Reviewed: 3/1/2018    [id number] [employer]. [address] [place] [id number]. All rights reserved. This information is not intended as a substitute for professional medical care. Always follow your healthcare professional's instructions.

## 2020-05-18 NOTE — PROGRESS NOTES
SUBJECTIVE:   Alejandra Villegas is a 54 year old female who presents to clinic today for the following health issues:    Patient presents to clinic for evaluation of acid reflux. She has had this since she was a teen. It will wake her in the middle of the night and she can't sleep.   Associated symptoms: heart burn, burning back of throat, regurgitation  Aggravated by: spicy foods  Treatment: she has been using OTC Prilosec 10 mg tablet daily. She would like a prescription today because she can't afford the OCT medication.       Gastric irritants:   Caffeine: Diet pepsi 32 ounces, energy drink 3 per day  Tobacco use: 1/2 ppd cigarettes  Alcohol: none  Drugs/THC: none  NSAID: takes 800 mg 3 times daily for headaches        Patient Active Problem List    Diagnosis Date Noted     Anxiety state 01/30/2018     Priority: Medium     Other isolated or specific phobias 01/30/2018     Priority: Medium     Degenerative disc disease at L5-S1 level 01/30/2018     Priority: Medium     Dyslipidemia 01/30/2018     Priority: Medium     Overview:   low HDL       Hidradenitis suppurativa 01/30/2018     Priority: Medium     Mild persistent asthma without complication 01/30/2018     Priority: Medium     Overview:   versus chronic obstructive pulmonary disease       Obesity 01/30/2018     Priority: Medium     Tobacco use disorder 01/30/2018     Priority: Medium     Gastroesophageal reflux disease 01/18/2017     Priority: Medium     Pain management contract broken 06/05/2015     Priority: Medium     Overview:   Contract violation - see 12/1/15 letter.       HTN (hypertension) 08/25/2014     Priority: Medium     Urge incontinence 06/04/2014     Priority: Medium     Alopecia areata 02/21/2014     Priority: Medium     Pyelonephritis due to Escherichia coli 06/08/2013     Priority: Medium     Benign paroxysmal positional vertigo 02/28/2013     Priority: Medium     Ovarian cyst, left 05/09/2012     Priority: Medium     Other acquired  deformity of ankle and foot(736.79) 05/09/2012     Priority: Medium     RA (rheumatoid arthritis) (H) 05/09/2012     Priority: Medium     Overview:   Diagnosed [place] 2012       Asthma 05/04/2012     Priority: Medium     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     Priority: Medium     Seasonal allergic rhinitis 09/27/2011     Priority: Medium     Symptomatic menopausal or female climacteric states 09/21/2010     Priority: Medium     Abdominal pain, left lower quadrant 08/06/2010     Priority: Medium     Other diseases of lung, not elsewhere classified 08/01/2007     Priority: Medium     Diabetes mellitus type 2, controlled, without complications (H) 07/01/2007     Priority: Medium     Past Medical History:   Diagnosis Date     Allergic rhinitis 09/27/2011          Disorder of kidney and ureter 08/08/2008    Kidney disease GFR 87     Dorsalgia     No Comments Provided     Excessive and frequent menstruation with regular cycle     Menorrhagia     Generalized anxiety disorder     No Comments Provided     Hidradenitis suppurativa     No Comments Provided     Hyperlipidemia     No Comments Provided     Obesity 07/01/2007    Obesity  Body-mass index over 30     Other disorders of lung (CODE) 08/01/2007    Pulmonary nodules, stable on follow-up chest CT 12/08.  No further imaging recommended.     Other specified disorders of Eustachian tube, unspecified ear 02/27/2012          Other specified phobia     No Comments Provided     Personal history of other medical treatment (CODE)     Childbirth x4     Rheumatoid arthritis (H) 02/27/2012          Tobacco use     No Comments Provided     Type 2 diabetes mellitus without complications (H) 07/01/2007          Uncomplicated asthma     No Comments Provided      Past Surgical History:   Procedure Laterality Date     ARTHROSCOPY KNEE  05/2017    Dr Torres     ARTHROSCOPY KNEE  07/20/2017    Dr Garza, lateral release, meniscal repair     ARTHROTOMY WRIST Left  2011    Dr. Torres     CHOLECYSTECTOMY  06/2007    Emergency tracheotomy following failed intubation for laparoscopic cholecystectomy.     DILATION AND CURETTAGE  09/2006          HERNIA REPAIR  2007    Incisoinal hernia repair     HYSTERECTOMY TOTAL ABDOMINAL  02/2010          IMPLANT STIMULATOR AND LEADS SACRAL NERVE (STAGE ONE AND TWO) N/A 12/11/2018    Procedure: Interstim Battery Replacement;  Surgeon: Rl Ambriz MD;  Location: [facility] OR     INTERSTIM DEVICE STAGE 2  01/06/2014    Dr. [facility]riz [facility]     LAPAROSCOPIC ABLATION ENDOMETRIOSIS  09/2006          OOPHORECTOMY Left 2010     Dr Thorpe     RELEASE CARPAL TUNNEL  02/02/2017          SALPINGO OOPHORECTOMY,R/L/KELSEY Right 06/1999    Right salpingo-oophorectomy for hemorrhagic corpus luteum cyst     TRACHEOSTOMY  2007    emergency following failed intubation during cholecystectomy     TYMPANOSTOMY, LOCAL/TOPICAL ANESTHESIA  08/2006    PE tube placement     Social History     Social History Narrative     four times and now . She is unemployed.  Lives with her oldest son(he lives with her).  4 sons, 1 son lives with her others are all in the area.     Current Outpatient Medications   Medication Sig Dispense Refill     albuterol (PROAIR HFA/PROVENTIL HFA/VENTOLIN HFA) 108 (90 Base) MCG/ACT inhaler Inhale 2 puffs into the lungs every 4 hours as needed 1 Inhaler 11     albuterol (PROVENTIL) (2.5 MG/3ML) 0.083% neb solution Take 1 vial (2.5 mg) by nebulization every 6 hours as needed for shortness of breath / dyspnea or wheezing 30 vial 4     atorvastatin (LIPITOR) 40 MG tablet Take 1 tablet (40 mg) by mouth At Bedtime 90 tablet 4     gabapentin (NEURONTIN) 400 MG capsule Take 2 capsules (800 mg) by mouth 2 times daily 360 capsule 4     ibuprofen (ADVIL/MOTRIN) 800 MG tablet TAKE 1 TABLET BY MOUTH THREE TIMES DAILY AS NEEDED 90 tablet 3     lisinopril (ZESTRIL) 10 MG tablet Take 1 tablet (10 mg) by mouth daily 90 tablet 4     metFORMIN (GLUCOPHAGE)  500 MG tablet TAKE 1 TABLET(500 MG) BY MOUTH EVERY DAY WITH BREAKFAST 90 tablet 4     nicotine (NICODERM CQ) 14 MG/24HR 24 hr patch Place 1 patch onto the skin every 24 hours 30 patch 1     nicotine (NICODERM CQ) 7 MG/24HR 24 hr patch Place 1 patch onto the skin every 24 hours 30 patch 1     order for DME Equipment being ordered: home neb set up with mask and tubing 1 Device 0     PARoxetine (PAXIL) 20 MG tablet Take 1 tablet (20 mg) by mouth every morning 90 tablet 3     Allergies   Allergen Reactions     Adhesive Tape      Bee Pollen Swelling     Seasonal     Codeine      Other reaction(s): Headache  Tylenol #3     Folic Acid Itching     Pollen Extract      Seasonal     Amoxicillin Rash     Liquid Adhesive Rash     Nabumetone GI Disturbance     GI upset       Review of Systems   Constitutional: Negative for appetite change, chills, diaphoresis and fever.   HENT: Negative.    Eyes: Negative.    Respiratory: Negative for chest tightness and shortness of breath.    Cardiovascular: Negative for chest pain and palpitations.   Gastrointestinal: Positive for heartburn. Negative for abdominal pain, constipation, diarrhea and vomiting.   Skin: Negative for rash.   Neurological: Positive for headaches.        OBJECTIVE:     /72   Pulse 70   Temp 97.9  F (36.6  C) (Tympanic)   Resp 24   Wt 79.9 kg (176 lb 3.2 oz)   SpO2 93%   BMI 26.86 kg/m    Body mass index is 26.86 kg/m .  Physical Exam  Constitutional:       General: She is not in acute distress.     Appearance: Normal appearance. She is not ill-appearing.   HENT:      Head: Normocephalic and atraumatic.      Mouth/Throat:      Mouth: Mucous membranes are moist.      Pharynx: Posterior oropharyngeal erythema present.   Eyes:      Extraocular Movements: Extraocular movements intact.      Pupils: Pupils are equal, round, and reactive to light.   Neck:      Musculoskeletal: Normal range of motion and neck supple. No neck rigidity.   Cardiovascular:      Rate and  Rhythm: Normal rate and regular rhythm.   Pulmonary:      Effort: Pulmonary effort is normal.      Breath sounds: Normal breath sounds.   Abdominal:      General: Abdomen is flat. Bowel sounds are normal. There is no distension.      Palpations: Abdomen is soft. There is no mass.      Tenderness: There is no abdominal tenderness. There is no right CVA tenderness, left CVA tenderness, guarding or rebound.   Skin:     General: Skin is warm.   Neurological:      Mental Status: She is alert and oriented to person, place, and time.   Psychiatric:         Mood and Affect: Mood normal.         Diagnostic Test Results:  none     ASSESSMENT/PLAN:       1. Gastroesophageal reflux disease, esophagitis presence not specified  Chronic history of GERD, patient would like prescription today for omeprazole. She is only reporting to take 10 mg daily of OTC Prilosec. Will increase this to 20 mg today. Patient is willing to decreased her gastric irritants including switching from ibuprofen to tylenol for headaches. Encouraged her to follow up with PCP regarding headaches and discussed possibility of rebound headache.  Patient is working to decreased tobacco and is also willing to decrease caffeine as well. Follow up with PCP in 6 weeks, sooner if symptoms persist or worsen. Discussed warning signs and follow up.     - omeprazole (PRILOSEC) 20 MG DR capsule; Take 1 capsule (20 mg) by mouth daily  Dispense: 42 capsule; Refill: 0      Anne Marie Mclain PA-C  [facility]

## 2020-05-18 NOTE — LETTER
May 18, 2020      Alejandra Villegas  [address]   [facility] [address]        To Whom It May Concern:    Alejandra Villegas  was seen on 5/18/2020.  Please excuse her today due to illness.        Sincerely,        Anne Marie Mclain PA-C

## 2020-05-19 ASSESSMENT — ENCOUNTER SYMPTOMS
EYES NEGATIVE: 1
CONSTIPATION: 0
HEARTBURN: 1
CHEST TIGHTNESS: 0
DIAPHORESIS: 0
SHORTNESS OF BREATH: 0
CHILLS: 0
DIARRHEA: 0
ABDOMINAL PAIN: 0
PALPITATIONS: 0
APPETITE CHANGE: 0
FEVER: 0
VOMITING: 0
HEADACHES: 1

## 2020-05-22 DIAGNOSIS — F41.1 ANXIETY STATE: ICD-10-CM

## 2020-05-22 RX ORDER — PAROXETINE 20 MG/1
TABLET, FILM COATED ORAL
Qty: 90 TABLET | Refills: 1 | Status: SHIPPED | OUTPATIENT
Start: 2020-05-22 | End: 2020-12-01

## 2020-05-22 NOTE — TELEPHONE ENCOUNTER
Prescription approved per [facility].    Last refill      Paxil 20mg tablet  6/7/2019  90# 3 Refills    LOV:3/24/2020  No future visits planned    Warnings Override History for PARoxetine (PAXIL) 20 MG tablet [[id number]]     Overridden by Dipak Welsh MD on Apr 6, 2020 11:50 AM    Drug-Drug    1. IBUPROFEN / SELECTIVE SEROTONIN REUPTAKE INHIBITORS [Level: Major] [Reason: Tolerated medication/side effects in past]    Other Orders:  ibuprofen (ADVIL/MOTRIN) 800 MG tablet          Overridden by Dipak Welsh MD on Mar 10, 2020 8:26 AM    Drug-Drug    1. IBUPROFEN / SELECTIVE SEROTONIN REUPTAKE INHIBITORS [Level: Major] [Reason: Tolerated medication/side effects in past]    Other Orders:  ibuprofen (ADVIL/MOTRIN) 800 MG tablet          Overridden by Rylee Pierre DO on Feb 6, 2020 4:11 PM    Drug-Drug    1. IBUPROFEN / SELECTIVE SEROTONIN REUPTAKE INHIBITORS [Level: Major] [Reason: Tolerated medication/side effects in past]    Other Orders:  ibuprofen (ADVIL/MOTRIN) 800 MG tablet          Overridden by Rylee Pierre DO on Dec 27, 2019 6:31 PM    Drug-Drug    1. IBUPROFEN / SELECTIVE SEROTONIN REUPTAKE INHIBITORS [Level: Major] [Reason: Tolerated medication/side effects in past]    Other Orders:  ibuprofen (ADVIL/MOTRIN) 800 MG tablet          Lianna Talley RN on 5/22/2020 at 1:22 PM

## 2020-06-16 DIAGNOSIS — M51.379 DEGENERATIVE DISC DISEASE AT L5-S1 LEVEL: ICD-10-CM

## 2020-06-18 RX ORDER — GABAPENTIN 400 MG/1
CAPSULE ORAL
Qty: 360 CAPSULE | Refills: 4 | OUTPATIENT
Start: 2020-06-18

## 2020-06-18 NOTE — TELEPHONE ENCOUNTER
Redundant refill request refused: Too soon:    gabapentin (NEURONTIN) 400 MG capsule  360 capsule  4  3/24/2020   No    Sig - Route: Take 2 capsules (800 mg) by mouth 2 times daily - Oral    Sent to pharmacy as: gabapentin (NEURONTIN) 400 MG capsule    Class: E-Prescribe    Order: [id number]    E-Prescribing Status: Receipt confirmed by pharmacy (3/24/2020  4:10 PM CDT)      [id number] - [place], [place] - [address]      Unable to complete prescription refill per RN Medication Refill Policy. Consuelo Riley RN .............. 6/18/2020  10:17 AM

## 2020-06-22 ENCOUNTER — APPOINTMENT (OUTPATIENT)
Dept: GENERAL RADIOLOGY | Facility: OTHER | Age: 55
End: 2020-06-22
Attending: FAMILY MEDICINE
Payer: MEDICARE

## 2020-06-22 ENCOUNTER — HOSPITAL ENCOUNTER (EMERGENCY)
Facility: OTHER | Age: 55
Discharge: HOME | End: 2020-06-22
Attending: PHYSICIAN ASSISTANT | Admitting: PHYSICIAN ASSISTANT
Payer: MEDICARE

## 2020-06-22 VITALS
BODY MASS INDEX: 26.83 KG/M2 | RESPIRATION RATE: 16 BRPM | DIASTOLIC BLOOD PRESSURE: 74 MMHG | TEMPERATURE: 98.2 F | SYSTOLIC BLOOD PRESSURE: 124 MMHG | OXYGEN SATURATION: 95 % | WEIGHT: 176 LBS | HEART RATE: 73 BPM

## 2020-06-22 DIAGNOSIS — M25.531 RIGHT WRIST PAIN: ICD-10-CM

## 2020-06-22 PROCEDURE — 99283 EMERGENCY DEPT VISIT LOW MDM: CPT | Mod: Z6 | Performed by: PHYSICIAN ASSISTANT

## 2020-06-22 PROCEDURE — 73110 X-RAY EXAM OF WRIST: CPT | Mod: RT

## 2020-06-22 PROCEDURE — 99283 EMERGENCY DEPT VISIT LOW MDM: CPT | Performed by: PHYSICIAN ASSISTANT

## 2020-06-22 ASSESSMENT — ENCOUNTER SYMPTOMS
FEVER: 0
BRUISES/BLEEDS EASILY: 0
ADENOPATHY: 0
HEMATURIA: 0
SHORTNESS OF BREATH: 0
ABDOMINAL PAIN: 0
WOUND: 0
CONFUSION: 0
CHEST TIGHTNESS: 0
BACK PAIN: 0
CHILLS: 0

## 2020-06-22 NOTE — ED AVS SNAPSHOT
[facility]  [address]  [place] [address]  Phone:  [phone]  Fax:  [phone]                                    Alejandra Villegas   MRN: [id number]    Department:  [facility]   Date of Visit:  6/22/2020           After Visit Summary Signature Page    I have received my discharge instructions, and my questions have been answered. I have discussed any challenges I see with this plan with the nurse or doctor.    ..........................................................................................................................................  Patient/Patient Representative Signature      ..........................................................................................................................................  Patient Representative Print Name and Relationship to Patient    ..................................................               ................................................  Date                                   Time    ..........................................................................................................................................  Reviewed by Signature/Title    ...................................................              ..............................................  Date                                               Time          22EPIC Rev 08/18

## 2020-06-22 NOTE — DISCHARGE INSTRUCTIONS
Get plenty of fluids and use rest, ice, compression, elevation.  As we discussed, there may be a small fracture in your right wrist.  Wear your splint as much as possible.  Referral has been placed for you to follow-up with orthopedics, but I recommend that you call Dr. Sandoval as well to help facilitate that appointment.  Return to ED if there are worsening or concerning symptoms.

## 2020-06-22 NOTE — ED TRIAGE NOTES
Pt here by herself, pt reports that she punched a headboard in anger 2 days ago and has ongoing rt wrist pain, VSS, pt out into waiting room to wait for ED room

## 2020-06-22 NOTE — ED PROVIDER NOTES
History     Chief Complaint   Patient presents with     Wrist Pain     HPI  Alejandra Villegas is a 54 year old female who presents the emergency department for evaluation of right wrist pain.  She reports that approximately 2 days ago she punched her headboard while she was upset, since that time she has been having pain to her right wrist just below her thumb.  She denies any other injuries.    Allergies:  Allergies   Allergen Reactions     Adhesive Tape      Bee Pollen Swelling     Seasonal     Codeine      Other reaction(s): Headache  Tylenol #3     Folic Acid Itching     Pollen Extract      Seasonal     Amoxicillin Rash     Liquid Adhesive Rash     Nabumetone GI Disturbance     GI upset       Problem List:    Patient Active Problem List    Diagnosis Date Noted     Anxiety state 01/30/2018     Priority: Medium     Other isolated or specific phobias 01/30/2018     Priority: Medium     Degenerative disc disease at L5-S1 level 01/30/2018     Priority: Medium     Dyslipidemia 01/30/2018     Priority: Medium     Overview:   low HDL       Hidradenitis suppurativa 01/30/2018     Priority: Medium     Mild persistent asthma without complication 01/30/2018     Priority: Medium     Overview:   versus chronic obstructive pulmonary disease       Obesity 01/30/2018     Priority: Medium     Tobacco use disorder 01/30/2018     Priority: Medium     Gastroesophageal reflux disease 01/18/2017     Priority: Medium     Pain management contract broken 06/05/2015     Priority: Medium     Overview:   Contract violation - see 12/1/15 letter.       HTN (hypertension) 08/25/2014     Priority: Medium     Urge incontinence 06/04/2014     Priority: Medium     Alopecia areata 02/21/2014     Priority: Medium     Pyelonephritis due to Escherichia coli 06/08/2013     Priority: Medium     Benign paroxysmal positional vertigo 02/28/2013     Priority: Medium     Ovarian cyst, left 05/09/2012     Priority: Medium     Other acquired deformity of  ankle and foot(736.79) 05/09/2012     Priority: Medium     RA (rheumatoid arthritis) (H) 05/09/2012     Priority: Medium     Overview:   Diagnosed [place] 2012       Asthma 05/04/2012     Priority: Medium     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     Priority: Medium     Seasonal allergic rhinitis 09/27/2011     Priority: Medium     Symptomatic menopausal or female climacteric states 09/21/2010     Priority: Medium     Abdominal pain, left lower quadrant 08/06/2010     Priority: Medium     Other diseases of lung, not elsewhere classified 08/01/2007     Priority: Medium     Diabetes mellitus type 2, controlled, without complications (H) 07/01/2007     Priority: Medium        Past Medical History:    Past Medical History:   Diagnosis Date     Allergic rhinitis 09/27/2011     Disorder of kidney and ureter 08/08/2008     Dorsalgia      Excessive and frequent menstruation with regular cycle      Generalized anxiety disorder      Hidradenitis suppurativa      Hyperlipidemia      Obesity 07/01/2007     Other disorders of lung (CODE) 08/01/2007     Other specified disorders of Eustachian tube, unspecified ear 02/27/2012     Other specified phobia      Personal history of other medical treatment (CODE)      Rheumatoid arthritis (H) 02/27/2012     Tobacco use      Type 2 diabetes mellitus without complications (H) 07/01/2007     Uncomplicated asthma        Past Surgical History:    Past Surgical History:   Procedure Laterality Date     ARTHROSCOPY KNEE  05/2017    Dr Torres     ARTHROSCOPY KNEE  07/20/2017    Dr Garza, lateral release, meniscal repair     ARTHROTOMY WRIST Left 2011    Dr. Torres     CHOLECYSTECTOMY  06/2007    Emergency tracheotomy following failed intubation for laparoscopic cholecystectomy.     DILATION AND CURETTAGE  09/2006          HERNIA REPAIR  2007    Incisoinal hernia repair     HYSTERECTOMY TOTAL ABDOMINAL  02/2010          IMPLANT STIMULATOR AND LEADS SACRAL NERVE (STAGE ONE  AND TWO) N/A 12/11/2018    Procedure: Interstim Battery Replacement;  Surgeon: Rl Ambriz MD;  Location: [facility] OR     INTERSTIM DEVICE STAGE 2  01/06/2014    Dr. Ambriz - [facility]     LAPAROSCOPIC ABLATION ENDOMETRIOSIS  09/2006          OOPHORECTOMY Left 2010     Dr Thorpe     RELEASE CARPAL TUNNEL  02/02/2017          SALPINGO OOPHORECTOMY,R/L/KELSEY Right 06/1999    Right salpingo-oophorectomy for hemorrhagic corpus luteum cyst     TRACHEOSTOMY  2007    emergency following failed intubation during cholecystectomy     TYMPANOSTOMY, LOCAL/TOPICAL ANESTHESIA  08/2006    PE tube placement       Family History:    Family History   Problem Relation Age of Onset     Arthritis Mother         Arthritis,Rheumatoid     Cancer Mother         Cancer, lung and uterine cancer     Heart Disease Father         Heart Disease,CAD, CABG, peripheral vascular dise     Thyroid Disease Father         Thyroid Disease, hyperlipidemia     Other - See Comments Father         djd     Asthma Brother         Asthma     Asthma Sister         Asthma     Other - See Comments Sister         Psychiatric illness,Anxiety     Other - See Comments Son         x2 back surgeries     Breast Cancer No family hx of         Cancer-breast       Social History:  Marital Status:   [4]  Social History     Tobacco Use     Smoking status: Current Every Day Smoker     Packs/day: 0.50     Years: 32.00     Pack years: 16.00     Types: Cigarettes     Smokeless tobacco: Never Used   Substance Use Topics     Alcohol use: No     Alcohol/week: 0.0 standard drinks     Drug use: No     Comment: Drug use: No        Medications:    albuterol (PROAIR HFA/PROVENTIL HFA/VENTOLIN HFA) 108 (90 Base) MCG/ACT inhaler  albuterol (PROVENTIL) (2.5 MG/3ML) 0.083% neb solution  atorvastatin (LIPITOR) 40 MG tablet  gabapentin (NEURONTIN) 400 MG capsule  ibuprofen (ADVIL/MOTRIN) 800 MG tablet  lisinopril (ZESTRIL) 10 MG tablet  metFORMIN (GLUCOPHAGE) 500 MG tablet  nicotine (NICODERM  CQ) 14 MG/24HR 24 hr patch  nicotine (NICODERM CQ) 7 MG/24HR 24 hr patch  omeprazole (PRILOSEC) 20 MG DR capsule  order for DME  PARoxetine (PAXIL) 20 MG tablet          Review of Systems   Constitutional: Negative for chills and fever.   HENT: Negative for congestion.    Eyes: Negative for visual disturbance.   Respiratory: Negative for chest tightness and shortness of breath.    Cardiovascular: Negative for chest pain.   Gastrointestinal: Negative for abdominal pain.   Genitourinary: Negative for hematuria.   Musculoskeletal: Negative for back pain.        Right wrist pain   Skin: Negative for rash and wound.   Neurological: Negative for syncope.   Hematological: Negative for adenopathy. Does not bruise/bleed easily.   Psychiatric/Behavioral: Negative for confusion.       Physical Exam   BP: 124/74  Pulse: 73  Temp: 98.2  F (36.8  C)  Resp: 16  Weight: 79.8 kg (176 lb)  SpO2: 95 %      Physical Exam  Constitutional:       General: She is not in acute distress.     Appearance: She is well-developed. She is not diaphoretic.   HENT:      Head: Normocephalic and atraumatic.   Eyes:      General: No scleral icterus.     Conjunctiva/sclera: Conjunctivae normal.   Neck:      Musculoskeletal: Neck supple.   Cardiovascular:      Rate and Rhythm: Normal rate and regular rhythm.   Pulmonary:      Effort: Pulmonary effort is normal.      Breath sounds: Normal breath sounds.   Abdominal:      Palpations: Abdomen is soft.      Tenderness: There is no abdominal tenderness.   Musculoskeletal:         General: No deformity.      Comments: Tenderness to palpation in the right lateral/snuffbox area of wrist.   Lymphadenopathy:      Cervical: No cervical adenopathy.   Skin:     General: Skin is warm and dry.      Findings: No rash.   Neurological:      Mental Status: She is alert and oriented to person, place, and time. Mental status is at baseline.   Psychiatric:         Mood and Affect: Mood normal.         Behavior: Behavior  normal.         Thought Content: Thought content normal.         Judgment: Judgment normal.         ED Course        Procedures               Critical Care time:  none               Results for orders placed or performed during the hospital encounter of 06/22/20 (from the past 24 hour(s))   XR Wrist Right G/E 3 Views    Narrative    PROCEDURE:  XR WRIST RT G/E 3 VW    HISTORY: wrist pain after injury.    COMPARISON:  None.    TECHNIQUE:  4 views right wrist.    FINDINGS:  A small piece of sclerotic bone is present projecting over  the trapezium and trapezoid , projecting between the asymmetric the  first and second metacarpals. Correlate for focal tenderness. No other  finding to suggest acute fracture. The carpal arcs are maintained.  Consider follow-up.    ALIDA FELIPE MD       Medications - No data to display    Assessments & Plan (with Medical Decision Making)   Pt nontoxic in NAD. Heart, lung, bowel sounds normal, abd soft, nontender to palpation, nondistended. VSS, afebrile    She does have Tenderness to palpation in the right lateral/snuffbox area of wrist.  She does have good distal CMS.    X-ray read as A small piece of sclerotic bone is present projecting over  the trapezium and trapezoid , projecting between the asymmetric the  first and second metacarpals. Correlate for focal tenderness. No other  finding to suggest acute fracture. The carpal arcs are maintained.  Consider follow-up.    We will place the patient in a thumb spica splint and referral is placed for her to follow-up with Dr. Josue Sandoval in [place].  She will also call his office to help facilitate that appointment.  She is to return to the ED if there are worsening or concerning symptoms, she understands agrees with plan patient is discharged.    Abhijeet Sandoval PA-C        I have reviewed the nursing notes.    I have reviewed the findings, diagnosis, plan and need for follow up with the patient.       Discharge Medication List as  of 6/22/2020  3:25 PM          Final diagnoses:   Right wrist pain       6/22/2020   [facility]     Abhijeet Sandoval PA  06/22/20 [id number]

## 2020-08-19 ENCOUNTER — TELEPHONE (OUTPATIENT)
Dept: UROLOGY | Facility: OTHER | Age: 55
End: 2020-08-19

## 2020-08-19 NOTE — TELEPHONE ENCOUNTER
Called patient to schedule a 1 year  interstim follow up. Phone # has changed or been disconnected. Letter sent.    Shanel Horn on 8/19/2020 at 9:25 AM  
independent

## 2020-11-03 ENCOUNTER — HOSPITAL ENCOUNTER (OUTPATIENT)
Dept: GENERAL RADIOLOGY | Facility: OTHER | Age: 55
End: 2020-11-03
Attending: FAMILY MEDICINE
Payer: MEDICARE

## 2020-11-03 ENCOUNTER — OFFICE VISIT (OUTPATIENT)
Dept: FAMILY MEDICINE | Facility: OTHER | Age: 55
End: 2020-11-03
Attending: FAMILY MEDICINE
Payer: MEDICARE

## 2020-11-03 VITALS
RESPIRATION RATE: 18 BRPM | SYSTOLIC BLOOD PRESSURE: 120 MMHG | HEART RATE: 78 BPM | TEMPERATURE: 97.6 F | BODY MASS INDEX: 26.83 KG/M2 | DIASTOLIC BLOOD PRESSURE: 78 MMHG | WEIGHT: 176 LBS | OXYGEN SATURATION: 95 %

## 2020-11-03 DIAGNOSIS — G43.009 MIGRAINE WITHOUT AURA AND WITHOUT STATUS MIGRAINOSUS, NOT INTRACTABLE: ICD-10-CM

## 2020-11-03 DIAGNOSIS — J45.30 MILD PERSISTENT ASTHMA WITHOUT COMPLICATION: ICD-10-CM

## 2020-11-03 DIAGNOSIS — I10 ESSENTIAL HYPERTENSION: ICD-10-CM

## 2020-11-03 DIAGNOSIS — M51.379 DEGENERATIVE DISC DISEASE AT L5-S1 LEVEL: ICD-10-CM

## 2020-11-03 DIAGNOSIS — R05.9 COUGH: Primary | ICD-10-CM

## 2020-11-03 DIAGNOSIS — R05.9 COUGH: ICD-10-CM

## 2020-11-03 PROCEDURE — 99214 OFFICE O/P EST MOD 30 MIN: CPT | Performed by: FAMILY MEDICINE

## 2020-11-03 PROCEDURE — C9803 HOPD COVID-19 SPEC COLLECT: HCPCS

## 2020-11-03 PROCEDURE — G0463 HOSPITAL OUTPT CLINIC VISIT: HCPCS

## 2020-11-03 PROCEDURE — 71046 X-RAY EXAM CHEST 2 VIEWS: CPT

## 2020-11-03 RX ORDER — LISINOPRIL 10 MG/1
10 TABLET ORAL DAILY
Qty: 90 TABLET | Refills: 4 | Status: SHIPPED | OUTPATIENT
Start: 2020-11-03 | End: 2022-03-30

## 2020-11-03 RX ORDER — IBUPROFEN 800 MG/1
TABLET, FILM COATED ORAL
Qty: 90 TABLET | Refills: 3 | Status: SHIPPED | OUTPATIENT
Start: 2020-11-03 | End: 2021-03-04

## 2020-11-03 RX ORDER — SUMATRIPTAN 50 MG/1
50 TABLET, FILM COATED ORAL
Qty: 9 TABLET | Refills: 1 | Status: SHIPPED | OUTPATIENT
Start: 2020-11-03 | End: 2022-03-30

## 2020-11-03 RX ORDER — ALBUTEROL SULFATE 90 UG/1
2 AEROSOL, METERED RESPIRATORY (INHALATION) EVERY 4 HOURS PRN
Qty: 1 INHALER | Refills: 11 | Status: SHIPPED | OUTPATIENT
Start: 2020-11-03 | End: 2022-03-30

## 2020-11-03 RX ORDER — ALBUTEROL SULFATE 0.83 MG/ML
2.5 SOLUTION RESPIRATORY (INHALATION) EVERY 6 HOURS PRN
Qty: 60 VIAL | Refills: 4 | Status: SHIPPED | OUTPATIENT
Start: 2020-11-03 | End: 2022-03-18

## 2020-11-03 NOTE — PROGRESS NOTES
There are no exam notes on file for this visit.     SUBJECTIVE:  54 year old female smoker with asthma presents for cough for a week. Slight yellow phlegm. No fever. No SOB. No anosmia, diarrhea, abdominal pain.  She is concerned about potential pneumonia  Ears hurt. Her housemate tested negative for COVID last week. Has albuterol inhaler and nebs on med list, but out and not using. Feels like she could use albuterol    Waking up with migraines. Bilateral temples. No associated photophobia, phonophobia, or nausea.      REVIEW OF SYSTEMS:    Pertinent items are noted in HPI.    Current Outpatient Medications   Medication Sig Dispense Refill     albuterol (PROAIR HFA/PROVENTIL HFA/VENTOLIN HFA) 108 (90 Base) MCG/ACT inhaler Inhale 2 puffs into the lungs every 4 hours as needed 1 Inhaler 11     albuterol (PROVENTIL) (2.5 MG/3ML) 0.083% neb solution Take 1 vial (2.5 mg) by nebulization every 6 hours as needed for shortness of breath / dyspnea or wheezing 30 vial 4     atorvastatin (LIPITOR) 40 MG tablet Take 1 tablet (40 mg) by mouth At Bedtime 90 tablet 4     gabapentin (NEURONTIN) 400 MG capsule Take 2 capsules (800 mg) by mouth 2 times daily 360 capsule 4     ibuprofen (ADVIL/MOTRIN) 800 MG tablet TAKE 1 TABLET BY MOUTH THREE TIMES DAILY AS NEEDED 90 tablet 3     lisinopril (ZESTRIL) 10 MG tablet Take 1 tablet (10 mg) by mouth daily 90 tablet 4     metFORMIN (GLUCOPHAGE) 500 MG tablet TAKE 1 TABLET(500 MG) BY MOUTH EVERY DAY WITH BREAKFAST 90 tablet 4     nicotine (NICODERM CQ) 14 MG/24HR 24 hr patch Place 1 patch onto the skin every 24 hours 30 patch 1     nicotine (NICODERM CQ) 7 MG/24HR 24 hr patch Place 1 patch onto the skin every 24 hours 30 patch 1     order for DME Equipment being ordered: home neb set up with mask and tubing 1 Device 0     PARoxetine (PAXIL) 20 MG tablet TAKE 1 TABLET(20 MG) BY MOUTH EVERY MORNING 90 tablet 1     Allergies   Allergen Reactions     Adhesive Tape      Bee Pollen Swelling      Seasonal     Codeine      Other reaction(s): Headache  Tylenol #3     Folic Acid Itching     Pollen Extract      Seasonal     Amoxicillin Rash     Liquid Adhesive Rash     Nabumetone GI Disturbance     GI upset       OBJECTIVE:  /78   Pulse 78   Temp 97.6  F (36.4  C) (Temporal)   Resp 18   Wt 79.8 kg (176 lb)   SpO2 95%   BMI 26.83 kg/m      EXAM:  General Appearance: Pleasant, alert, appropriate appearance for age. No acute distress  Ear Exam: Normal TM's bilaterally. Normal auditory canals  OroPharynx Exam: Normal pharynx.  Neck Exam: Supple, no masses or nodes.  Chest/Respiratory Exam: No rales. Diffuse wheezing both lungs  Cardiovascular Exam: Regular rate and rhythm. S1, S2, no murmur, click, gallop, or rubs.  Psychiatric: Normal affect and mentation       ASSESSMENT/PLAN:    ICD-10-CM    1. Cough  R05 XR Chest 2 Views     Symptomatic COVID-19 Virus (Coronavirus) by PCR   2. Mild persistent asthma without complication  J45.30 albuterol (PROVENTIL) (2.5 MG/3ML) 0.083% neb solution     albuterol (PROAIR HFA/PROVENTIL HFA/VENTOLIN HFA) 108 (90 Base) MCG/ACT inhaler   3. Essential hypertension  I10 lisinopril (ZESTRIL) 10 MG tablet   4. Degenerative disc disease at L5-S1 level  M51.36 ibuprofen (ADVIL/MOTRIN) 800 MG tablet   5. Migraine without aura and without status migrainosus, not intractable  G43.009 SUMAtriptan (IMITREX) 50 MG tablet         Cough, but normal sats and no significant respiratory distress. She felt more comfortable with an x-ray, which shows no pneumonia. Recommend using albuterol inhaler until COVID test returns (to reduce risk of aerosolizing to housemate) and then may use albuterol nebs as needed to help breathing.   Return if worsening    Refilled lisinopril and as needed ibuprofen    Try sumatriptan to help migraines. Never used a triptan to her recollection. Can use Zomig or Maxalt instead if not helping  Cautioned about risks especially with overuse    Follow-up 6 months for  diabetes     Greater than 50% of this 25 minute appointment spent on counseling   Dipak Welsh MD    This document was prepared using a combination of typing and voice generated software.  While every attempt was made for accuracy, spelling and grammatical errors may exist.

## 2020-11-03 NOTE — PATIENT INSTRUCTIONS
Use albuterol inhaler to help cough until COVID back  If COVID negative, then okay to use nebulizer instead to help with cough    For migraine, take ibuprofen plus Imitrex as soon as headache begins (or when you wake up).  May repeat Imitrex in 1 hour if not helping  Let me know if it does not work or causes side effects  Do not use the Imitrex more than a couple times per week, short term    Refilled medications  Follow-up in the spring for labs and appointment

## 2020-11-29 DIAGNOSIS — F41.1 ANXIETY STATE: ICD-10-CM

## 2020-12-01 RX ORDER — PAROXETINE 20 MG/1
TABLET, FILM COATED ORAL
Qty: 90 TABLET | Refills: 1 | Status: SHIPPED | OUTPATIENT
Start: 2020-12-01 | End: 2021-05-24

## 2020-12-01 NOTE — TELEPHONE ENCOUNTER
[facility] sent Rx request for the following:      PAROXETINE 20MG TABLETS  Sig: TAKE 1 TABLET(20 MG) BY MOUTH EVERY MORNING      Last Prescription Date:   5/22/2020  Last Fill Qty/Refills:         90, R-1    Last Office Visit:              11/3/2020   Future Office visit:           None      Prescription refilled per RN Medication Refill Policy.................... Leonard Ambriz RN ....................  12/1/2020   8:24 AM

## 2021-02-02 ENCOUNTER — TRANSFERRED RECORDS (OUTPATIENT)
Dept: HEALTH INFORMATION MANAGEMENT | Facility: OTHER | Age: 56
End: 2021-02-02

## 2021-02-03 PROBLEM — M85.871 OSTEOPENIA OF RIGHT FOOT: Status: ACTIVE | Noted: 2021-02-03

## 2021-02-03 PROBLEM — M85.872 OSTEOPENIA OF LEFT FOOT: Status: ACTIVE | Noted: 2021-02-03

## 2021-02-05 ENCOUNTER — HOSPITAL ENCOUNTER (OUTPATIENT)
Dept: CT IMAGING | Facility: OTHER | Age: 56
Discharge: HOME | End: 2021-02-05
Attending: NURSE PRACTITIONER | Admitting: FAMILY MEDICINE
Payer: MEDICARE

## 2021-02-05 ENCOUNTER — TELEPHONE (OUTPATIENT)
Dept: FAMILY MEDICINE | Facility: OTHER | Age: 56
End: 2021-02-05

## 2021-02-05 DIAGNOSIS — M79.671 PAIN IN RIGHT FOOT: ICD-10-CM

## 2021-02-05 PROCEDURE — 73700 CT LOWER EXTREMITY W/O DYE: CPT | Mod: RT

## 2021-02-05 NOTE — TELEPHONE ENCOUNTER
Pt states that Josue Sandoval MD sent records to Dr Gordon regarding her foot, she had a CT done today.  Pt wants something for pain, currently taking IB and Tylenol and it is not helping

## 2021-02-19 ENCOUNTER — TRANSFERRED RECORDS (OUTPATIENT)
Dept: HEALTH INFORMATION MANAGEMENT | Facility: OTHER | Age: 56
End: 2021-02-19
Payer: MEDICARE

## 2021-02-23 ENCOUNTER — TRANSFERRED RECORDS (OUTPATIENT)
Dept: HEALTH INFORMATION MANAGEMENT | Facility: OTHER | Age: 56
End: 2021-02-23
Payer: MEDICARE

## 2021-03-02 DIAGNOSIS — M51.379 DEGENERATIVE DISC DISEASE AT L5-S1 LEVEL: Primary | ICD-10-CM

## 2021-03-04 RX ORDER — IBUPROFEN 800 MG/1
TABLET, FILM COATED ORAL
Qty: 90 TABLET | Refills: 1 | Status: SHIPPED | OUTPATIENT
Start: 2021-03-04 | End: 2021-04-28

## 2021-03-04 NOTE — TELEPHONE ENCOUNTER
[facility] sent Rx request for the following:      Requested Prescriptions   Pending Prescriptions Disp Refills   ibuprofen (ADVIL/MOTRIN) 800 MG tablet [Pharmacy Med Name: IBUPROFEN 800MG TABLETS] 90 tablet 3    Sig: TAKE 1 TABLET BY MOUTH THREE TIMES DAILY AS NEEDED   Last Prescription Date:   11/3/20  Last Fill Qty/Refills:         90, R-3    Last Office Visit:              11/3/20  Future Office visit:           None  Routing refill request to provider for review/approval because:   NSAID Medications Failed - 3/4/2021  9:56 AM       Failed - Normal ALT on file in past 12 months       Failed - Normal AST on file in past 12 months     Per LOV Note, Pt was instructed to follow up 6 months later; around 5/3/21.     In clinical absence of patient's primary, Dipak Welsh, patient is requesting that this message be sent to the covering provider for consideration please.  He is scheduled to return 3/8/21.    Unable to complete prescription refill per RN Medication Refill Policy. Consuelo Riley RN .............. 3/4/2021  10:04 AM

## 2021-03-10 ENCOUNTER — TRANSFERRED RECORDS (OUTPATIENT)
Dept: HEALTH INFORMATION MANAGEMENT | Facility: OTHER | Age: 56
End: 2021-03-10
Payer: MEDICARE

## 2021-03-26 DIAGNOSIS — M51.379 DEGENERATIVE DISC DISEASE AT L5-S1 LEVEL: ICD-10-CM

## 2021-03-26 RX ORDER — GABAPENTIN 400 MG/1
CAPSULE ORAL
Qty: 360 CAPSULE | Refills: 2 | Status: SHIPPED | OUTPATIENT
Start: 2021-03-26 | End: 2022-03-30

## 2021-03-26 NOTE — TELEPHONE ENCOUNTER
[facility] sent Rx request for the following:   Requested Prescriptions   Pending Prescriptions Disp Refills     gabapentin (NEURONTIN) 400 MG capsule [Pharmacy Med Name: GABAPENTIN 400MG CAPSULES] 360 capsule 4     Sig: TAKE 2 CAPSULES(800 MG) BY MOUTH TWICE DAILY       There is no refill protocol information for this order      Last Prescription Date:   3/24/2020  Last Fill Qty/Refills:         360, R-4    Last Office Visit:              11/30/2020 ([facility])   Future Office visit:           None noted  Routing refill request to provider for review/approval because:  Drug not on the [facility], [facility] or [facility] refill protocol or controlled substance  Mey Burns RN ....................  3/26/2021   8:30 AM

## 2021-04-28 DIAGNOSIS — M51.379 DEGENERATIVE DISC DISEASE AT L5-S1 LEVEL: ICD-10-CM

## 2021-04-28 RX ORDER — IBUPROFEN 800 MG/1
TABLET, FILM COATED ORAL
Qty: 90 TABLET | Refills: 1 | Status: SHIPPED | OUTPATIENT
Start: 2021-04-28 | End: 2021-06-29

## 2021-04-28 NOTE — TELEPHONE ENCOUNTER
[facility] sent Rx request for the following:   ibuprofen (ADVIL/MOTRIN) 800 MG tablet   Sig: TAKE 1 TABLET BY MOUTH THREE TIMES DAILY AS NEEDED    Last Prescription Date:   03/04/2021  Last Fill Qty/Refills:         90, R-1    Last Office Visit:              11/03/2020 ([facility])   Future Office visit:           None noted   NSAID Medications Failed - 4/28/2021  3:58 AM        Failed - Normal ALT on file in past 12 months     Recent Labs   Lab Test 09/17/17 2114   GICHALT 9             Failed - Normal AST on file in past 12 months     Recent Labs   Lab Test 09/17/17 2114   GICHAST 11*             Failed - Normal CBC on file in past 12 months     Recent Labs   Lab Test 03/24/20  1614 09/17/17 2055 09/17/17 2055   WBC 8.2   < >  --    GICHWBC  --   --  11.3*   RBC 4.39   < >  --    GICHRBC  --   --  4.26   HGB 14.2   < > 14.2   HCT 41.1   < > 40.1      < > 338    < > = values in this interval not displayed.                 Failed - Normal serum creatinine on file in past 12 months     Recent Labs   Lab Test 03/24/20 1614   CR 0.88       Ok to refill medication if creatinine is low     Per LOV Note, Pt was instructed to follow up 6 months later; around 5/3/21.   Routing for PCP review. Unable to complete prescription refill per RN Medication Refill Policy.................... Marizol Humphrey RN ....................  4/28/2021   8:23 AM

## 2021-05-06 ENCOUNTER — TELEPHONE (OUTPATIENT)
Dept: FAMILY MEDICINE | Facility: OTHER | Age: 56
End: 2021-05-06

## 2021-05-06 NOTE — TELEPHONE ENCOUNTER
[facility] told her they sent over for refill for Gabapentin 400 ---pt aware you are gone until Fri. Please call---

## 2021-05-06 NOTE — TELEPHONE ENCOUNTER
Disp Refills Start End SYMONE   gabapentin (NEURONTIN) 400 MG capsule 360 capsule 2 3/26/2021  No   Sig: TAKE 2 CAPSULES(800 MG) BY MOUTH TWICE DAILY     Pharmacy    [id number] - [place], [place] - [address]     Needs a prior a prior authorization. Was this received from pharmacy? I called pharmacy and they reported they had sent one.    Janice Swain RN  ....................  5/6/2021   2:08 PM

## 2021-05-19 NOTE — TELEPHONE ENCOUNTER
"Patient reports the following, \"I got my medications figured out, I'm fine now.\"  Patient informed that she is over due for an office visit with labs for Diabetes management.  Patient was offered to be transferred to appointment line and declined at this time.      Natasha Damon LPN 5/19/2021 10:42 AM      "

## 2021-05-19 NOTE — TELEPHONE ENCOUNTER
Per [facility], insurance indicates that 1200 mg of Gabapentin is the MAX dose per day.    Patient is due for an office visit this month to recheck DM labs and can discuss pain management.      Left message for patient to call back.  1000 appointment available today.      Natasha Damon LPN 5/19/2021 9:19 AM

## 2021-05-23 DIAGNOSIS — F41.1 ANXIETY STATE: ICD-10-CM

## 2021-05-24 RX ORDER — PAROXETINE 20 MG/1
TABLET, FILM COATED ORAL
Qty: 90 TABLET | Refills: 0 | Status: SHIPPED | OUTPATIENT
Start: 2021-05-24 | End: 2021-09-09

## 2021-05-24 NOTE — TELEPHONE ENCOUNTER
[facility] sent Rx request for the following:      PAROXETINE 20MG TABLETS  Sig: TAKE 1 TABLET(20 MG) BY MOUTH EVERY MORNING      Last Prescription Date:   12/1/2020  Last Fill Qty/Refills:         90, R-1    Last Office Visit:              11/3/2020   Future Office visit:           none    Needs 6 month follow-up appt. Will give 90 day refill.    Prescription refilled per RN Medication Refill Policy.................... Leonard Ambriz RN ....................  5/24/2021   3:51 PM

## 2021-06-13 ENCOUNTER — PATIENT OUTREACH (OUTPATIENT)
Dept: FAMILY MEDICINE | Facility: OTHER | Age: 56
End: 2021-06-13

## 2021-06-13 NOTE — TELEPHONE ENCOUNTER
Patient Quality Outreach      Summary:    Patient has the following on her problem list/HM:   Asthma review       ACT Total Scores 6/7/2019   ACT TOTAL SCORE (Goal Greater than or Equal to 20) 23   In the past 12 months, how many times did you visit the emergency room for your asthma without being admitted to the hospital? 0   In the past 12 months, how many times were you hospitalized overnight because of your asthma? 0          Diabetes    Last A1C:  Lab Results   Component Value Date    A1C 5.8 03/24/2020    A1C 5.8 12/03/2018       Last LDL:    Lab Results   Component Value Date    LDL 89 03/24/2020       Is the patient on a Statin? Yes          Is the patient on Aspirin? No    Medications     HMG CoA Reductase Inhibitors     atorvastatin (LIPITOR) 40 MG tablet             Last three blood pressure readings:  BP Readings from Last 3 Encounters:   11/03/20 120/78   06/22/20 124/74   05/18/20 110/72            Tobacco Use      Smoking status: Current Every Day Smoker        Packs/day: 0.50        Years: 32.00        Pack years: 16        Types: Cigarettes      Smokeless tobacco: Never Used          Patient is due/failing the following:   A1C, Eye Exam, Microalbumin, BP Check and Diabetic Follow-Up Visit, ACT needed and Annual wellness, date due: over due    Type of outreach:    Sent letter.    Questions for provider review:    None                                                                                                                                     Norma J. Gosselin, LPN       Chart routed to Care Team.

## 2021-06-28 DIAGNOSIS — M51.379 DEGENERATIVE DISC DISEASE AT L5-S1 LEVEL: ICD-10-CM

## 2021-06-28 NOTE — TELEPHONE ENCOUNTER
[facility] sent Rx request for the following:      Requested Prescriptions   Pending Prescriptions Disp Refills   ibuprofen (ADVIL/MOTRIN) 800 MG tablet [Pharmacy Med Name: IBUPROFEN 800MG TABLETS] 90 tablet 1    Sig: TAKE 1 TABLET BY MOUTH THREE TIMES DAILY AS NEEDED   Last Prescription Date:   4/28/21  Last Fill Qty/Refills:         90, R-1    Last Office Visit:              11/3/20   Future Office visit:           None  Routing refill request to provider for review/approval because:   NSAID Medications Failed - 6/28/2021  3:37 PM       Failed - Normal ALT on file in past 12 months       Failed - Normal AST on file in past 12 months       Failed - Normal CBC on file in past 12 months       Failed - Normal serum creatinine on file in past 12 months     Per LOV note, Pt was instructed to follow up 6 months later; around 5/3/21. Pt overdue for appointment. Called and spoke to Patient after verifying last name and date of birth. Pt reminded of this information and was transferred to scheduling line, to set up appointment. Call lost.  verbalized plan to return call to Pt, to assist her in scheduling appointment.    Unable to complete prescription refill per RN Medication Refill Policy. Consuelo Riley RN .............. 6/29/2021  10:16 AM

## 2021-06-29 RX ORDER — IBUPROFEN 800 MG/1
TABLET, FILM COATED ORAL
Qty: 90 TABLET | Refills: 0 | Status: SHIPPED | OUTPATIENT
Start: 2021-06-29

## 2021-07-29 DIAGNOSIS — M51.379 DEGENERATIVE DISC DISEASE AT L5-S1 LEVEL: ICD-10-CM

## 2021-07-29 RX ORDER — IBUPROFEN 800 MG/1
TABLET, FILM COATED ORAL
Qty: 90 TABLET | Refills: 0 | OUTPATIENT
Start: 2021-07-29

## 2021-09-09 DIAGNOSIS — F41.1 ANXIETY STATE: ICD-10-CM

## 2021-09-10 RX ORDER — PAROXETINE 20 MG/1
TABLET, FILM COATED ORAL
Qty: 90 TABLET | Refills: 0 | Status: SHIPPED | OUTPATIENT
Start: 2021-09-10 | End: 2022-03-18

## 2022-03-18 DIAGNOSIS — F41.1 ANXIETY STATE: ICD-10-CM

## 2022-03-18 DIAGNOSIS — J45.30 MILD PERSISTENT ASTHMA WITHOUT COMPLICATION: ICD-10-CM

## 2022-03-18 RX ORDER — PAROXETINE 20 MG/1
TABLET, FILM COATED ORAL
Qty: 90 TABLET | Refills: 0 | Status: SHIPPED | OUTPATIENT
Start: 2022-03-18 | End: 2022-03-30

## 2022-03-18 RX ORDER — ALBUTEROL SULFATE 0.83 MG/ML
2.5 SOLUTION RESPIRATORY (INHALATION) EVERY 6 HOURS PRN
Qty: 60 ML | Refills: 11 | Status: SHIPPED | OUTPATIENT
Start: 2022-03-18 | End: 2023-08-04

## 2022-03-18 NOTE — TELEPHONE ENCOUNTER
Received a fax from [facility] . The patient wants her Rx changed to them.  Marleny Dudley LPN..................3/18/2022   9:26 AM

## 2022-03-30 ENCOUNTER — OFFICE VISIT (OUTPATIENT)
Dept: FAMILY MEDICINE | Facility: OTHER | Age: 57
End: 2022-03-30
Attending: FAMILY MEDICINE
Payer: MEDICARE

## 2022-03-30 VITALS
DIASTOLIC BLOOD PRESSURE: 70 MMHG | RESPIRATION RATE: 18 BRPM | SYSTOLIC BLOOD PRESSURE: 138 MMHG | HEART RATE: 64 BPM | TEMPERATURE: 97.7 F | WEIGHT: 189 LBS | HEIGHT: 66 IN | BODY MASS INDEX: 30.37 KG/M2

## 2022-03-30 DIAGNOSIS — G43.009 MIGRAINE WITHOUT AURA AND WITHOUT STATUS MIGRAINOSUS, NOT INTRACTABLE: ICD-10-CM

## 2022-03-30 DIAGNOSIS — E78.5 DYSLIPIDEMIA: ICD-10-CM

## 2022-03-30 DIAGNOSIS — E11.9 CONTROLLED TYPE 2 DIABETES MELLITUS WITHOUT COMPLICATION, WITHOUT LONG-TERM CURRENT USE OF INSULIN (H): Primary | ICD-10-CM

## 2022-03-30 DIAGNOSIS — Z87.891 PERSONAL HISTORY OF TOBACCO USE, PRESENTING HAZARDS TO HEALTH: ICD-10-CM

## 2022-03-30 DIAGNOSIS — F41.1 ANXIETY STATE: ICD-10-CM

## 2022-03-30 DIAGNOSIS — M51.379 DEGENERATIVE DISC DISEASE AT L5-S1 LEVEL: ICD-10-CM

## 2022-03-30 DIAGNOSIS — M79.671 RIGHT FOOT PAIN: ICD-10-CM

## 2022-03-30 DIAGNOSIS — J45.30 MILD PERSISTENT ASTHMA WITHOUT COMPLICATION: ICD-10-CM

## 2022-03-30 DIAGNOSIS — N18.2 CKD (CHRONIC KIDNEY DISEASE) STAGE 2, GFR 60-89 ML/MIN: ICD-10-CM

## 2022-03-30 DIAGNOSIS — I10 ESSENTIAL HYPERTENSION: ICD-10-CM

## 2022-03-30 LAB
ALBUMIN SERPL-MCNC: 4.3 G/DL (ref 3.5–5.7)
ALP SERPL-CCNC: 73 U/L (ref 34–104)
ALT SERPL W P-5'-P-CCNC: 13 U/L (ref 7–52)
ANION GAP SERPL CALCULATED.3IONS-SCNC: 8 MMOL/L (ref 3–14)
AST SERPL W P-5'-P-CCNC: 16 U/L (ref 13–39)
BILIRUB SERPL-MCNC: 0.4 MG/DL (ref 0.3–1)
BUN SERPL-MCNC: 24 MG/DL (ref 7–25)
CALCIUM SERPL-MCNC: 9.8 MG/DL (ref 8.6–10.3)
CHLORIDE BLD-SCNC: 99 MMOL/L (ref 98–107)
CHOLEST SERPL-MCNC: 261 MG/DL
CO2 SERPL-SCNC: 29 MMOL/L (ref 21–31)
CREAT SERPL-MCNC: 0.9 MG/DL (ref 0.6–1.2)
CREAT UR-MCNC: 67 MG/DL
FASTING STATUS PATIENT QL REPORTED: YES
GFR SERPL CREATININE-BSD FRML MDRD: 76 ML/MIN/1.73M2
GLUCOSE BLD-MCNC: 123 MG/DL (ref 70–105)
HBA1C MFR BLD: 6 % (ref 4–6.2)
HDLC SERPL-MCNC: 35 MG/DL (ref 23–92)
LDLC SERPL CALC-MCNC: ABNORMAL MG/DL
MICROALBUMIN UR-MCNC: 11 MG/L
MICROALBUMIN/CREAT UR: 16.42 MG/G CR (ref 0–25)
NONHDLC SERPL-MCNC: 226 MG/DL
POTASSIUM BLD-SCNC: 4.6 MMOL/L (ref 3.5–5.1)
PROT SERPL-MCNC: 7.3 G/DL (ref 6.4–8.9)
SODIUM SERPL-SCNC: 136 MMOL/L (ref 134–144)
TRIGL SERPL-MCNC: 481 MG/DL

## 2022-03-30 PROCEDURE — 99214 OFFICE O/P EST MOD 30 MIN: CPT | Mod: 25 | Performed by: FAMILY MEDICINE

## 2022-03-30 PROCEDURE — 36415 COLL VENOUS BLD VENIPUNCTURE: CPT | Mod: ZL | Performed by: FAMILY MEDICINE

## 2022-03-30 PROCEDURE — 80061 LIPID PANEL: CPT | Mod: ZL | Performed by: FAMILY MEDICINE

## 2022-03-30 PROCEDURE — 82043 UR ALBUMIN QUANTITATIVE: CPT | Mod: ZL | Performed by: FAMILY MEDICINE

## 2022-03-30 PROCEDURE — 99406 BEHAV CHNG SMOKING 3-10 MIN: CPT | Performed by: FAMILY MEDICINE

## 2022-03-30 PROCEDURE — G0463 HOSPITAL OUTPT CLINIC VISIT: HCPCS | Mod: 25

## 2022-03-30 PROCEDURE — 80053 COMPREHEN METABOLIC PANEL: CPT | Mod: ZL | Performed by: FAMILY MEDICINE

## 2022-03-30 PROCEDURE — 83036 HEMOGLOBIN GLYCOSYLATED A1C: CPT | Mod: ZL | Performed by: FAMILY MEDICINE

## 2022-03-30 RX ORDER — SUMATRIPTAN 50 MG/1
50 TABLET, FILM COATED ORAL
Qty: 9 TABLET | Refills: 1 | Status: SHIPPED | OUTPATIENT
Start: 2022-03-30

## 2022-03-30 RX ORDER — ALBUTEROL SULFATE 90 UG/1
2 AEROSOL, METERED RESPIRATORY (INHALATION) EVERY 4 HOURS PRN
Qty: 18 G | Refills: 1 | Status: SHIPPED | OUTPATIENT
Start: 2022-03-30 | End: 2022-04-14

## 2022-03-30 RX ORDER — NICOTINE 21 MG/24HR
1 PATCH, TRANSDERMAL 24 HOURS TRANSDERMAL EVERY 24 HOURS
Qty: 30 PATCH | Refills: 1 | Status: SHIPPED | OUTPATIENT
Start: 2022-03-30 | End: 2024-01-23

## 2022-03-30 RX ORDER — LISINOPRIL 10 MG/1
10 TABLET ORAL DAILY
Qty: 90 TABLET | Refills: 4 | Status: SHIPPED | OUTPATIENT
Start: 2022-03-30 | End: 2023-08-04

## 2022-03-30 RX ORDER — PAROXETINE 20 MG/1
TABLET, FILM COATED ORAL
Qty: 90 TABLET | Refills: 4 | Status: SHIPPED | OUTPATIENT
Start: 2022-03-30 | End: 2024-01-23

## 2022-03-30 RX ORDER — GABAPENTIN 400 MG/1
800 CAPSULE ORAL 2 TIMES DAILY
Qty: 360 CAPSULE | Refills: 4 | Status: SHIPPED | OUTPATIENT
Start: 2022-03-30 | End: 2024-01-23

## 2022-03-30 ASSESSMENT — ANXIETY QUESTIONNAIRES
6. BECOMING EASILY ANNOYED OR IRRITABLE: NOT AT ALL
7. FEELING AFRAID AS IF SOMETHING AWFUL MIGHT HAPPEN: NOT AT ALL
3. WORRYING TOO MUCH ABOUT DIFFERENT THINGS: NOT AT ALL
IF YOU CHECKED OFF ANY PROBLEMS ON THIS QUESTIONNAIRE, HOW DIFFICULT HAVE THESE PROBLEMS MADE IT FOR YOU TO DO YOUR WORK, TAKE CARE OF THINGS AT HOME, OR GET ALONG WITH OTHER PEOPLE: NOT DIFFICULT AT ALL
GAD7 TOTAL SCORE: 0
5. BEING SO RESTLESS THAT IT IS HARD TO SIT STILL: NOT AT ALL
1. FEELING NERVOUS, ANXIOUS, OR ON EDGE: NOT AT ALL
2. NOT BEING ABLE TO STOP OR CONTROL WORRYING: NOT AT ALL

## 2022-03-30 ASSESSMENT — PAIN SCALES - GENERAL: PAINLEVEL: SEVERE PAIN (6)

## 2022-03-30 ASSESSMENT — ASTHMA QUESTIONNAIRES: ACT_TOTALSCORE: 25

## 2022-03-30 ASSESSMENT — PATIENT HEALTH QUESTIONNAIRE - PHQ9
SUM OF ALL RESPONSES TO PHQ QUESTIONS 1-9: 0
5. POOR APPETITE OR OVEREATING: NOT AT ALL

## 2022-03-30 NOTE — PROGRESS NOTES
Assessment & Plan       ICD-10-CM    1. Controlled type 2 diabetes mellitus without complication, without long-term current use of insulin (H)  E11.9 metFORMIN (GLUCOPHAGE) 500 MG tablet     Comprehensive metabolic panel     Hemoglobin A1c     Albumin Random Urine Quantitative with Creat Ratio     Lipid Profile     Lipid Profile     Hemoglobin A1c     Comprehensive metabolic panel     Albumin Random Urine Quantitative with Creat Ratio   2. Essential hypertension  I10 lisinopril (ZESTRIL) 10 MG tablet   3. CKD (chronic kidney disease) stage 2, GFR 60-89 ml/min  N18.2    4. Dyslipidemia  E78.5 atorvastatin (LIPITOR) 40 MG tablet   5. Mild persistent asthma without complication  J45.30 albuterol (PROAIR HFA/PROVENTIL HFA/VENTOLIN HFA) 108 (90 Base) MCG/ACT inhaler   6. Anxiety state  F41.1 PARoxetine (PAXIL) 20 MG tablet   7. Migraine without aura and without status migrainosus, not intractable  G43.009 SUMAtriptan (IMITREX) 50 MG tablet   8. Degenerative disc disease at L5-S1 level  M51.37 gabapentin (NEURONTIN) 400 MG capsule   9. Personal history of tobacco use, presenting hazards to health  Z87.891 nicotine (NICODERM CQ) 14 MG/24HR 24 hr patch     nicotine (NICODERM CQ) 7 MG/24HR 24 hr patch   10. Right foot pain  M79.671 Orthopedic  Referral       Diabetes stable. Lipids 90 points higher, has not been on statin for over a year, no pharmacy dispense. Blood pressure controlled.  Refilled metformin, lisinopril, atorvastatin.    Using albuterol as needed. No indication for controller inhaler at this time. Encouraged smoking cessation. Given nicotine patches.    Refilled paroxetine, reports good benefit    Refilled sumatriptan, using as needed     Refilled gabapentin for chronic back pain    Referral to Dr Nathan to evaluate foot pain. Based on exam may have metatarsalgia vs Mcneil's neuroma. SUSANNAH for MRI from [place] to obtain prior to appointment        Tobacco Cessation:   reports that she has been  "smoking cigarettes. She has a 32.00 pack-year smoking history. She has never used smokeless tobacco.  Tobacco Cessation Action Plan: Pharmacotherapies : Nicotine patch   Reviewed available options for smoking cessation. Discussed appropriate use of medication. Performed tobacco cessation counseling for greater than 3 minutes.      BMI:   Estimated body mass index is 30.97 kg/m  as calculated from the following:    Height as of this encounter: 1.664 m (5' 5.5\").    Weight as of this encounter: 85.7 kg (189 lb).   Weight management plan: Discussed healthy diet and exercise guidelines    Encouraged return for a physical with pap, lung cancer screen and colon cancer screen discussion    Dipak Welsh MD  [facility]   Alejandra is a 56 year old who presents for the following health issues     History of Present Illness       Reason for visit:  Meds fill, right foot pain  Symptom onset:  More than a month  Symptom intensity:  Mild  Symptom progression:  Worsening  Had these symptoms before:  No  What makes it worse:  Range of Motion  What makes it better:  Ibuprofen helped at first, resting    She eats 2-3 servings of fruits and vegetables daily.She consumes 5 sweetened beverage(s) daily.She exercises with enough effort to increase her heart rate 9 or less minutes per day.  She exercises with enough effort to increase her heart rate 6 days per week.   She is taking medications regularly.     Overall stable. Working a lot at [employer]    Imitrex use 1-2 times per month    Albuterol uses about 4 times per month  Still smoking. Will try and quit again with nicotine patches    Not filling atorvastatin. Diabetes has been controlled. Tolerating medications.    No change desired in paroxetine, helps mood.    She was seen in [place] by Josue Sandoval and had an MRI obtained. Prescribed orthotics, but this has made her foot pain worse. Using ibuprofen and acetaminophen as needed for pain. Foot " "swollen when done with work.             Review of Systems   General: Denies general constitutional problems  Cardiovascular: Denies problems  Respiratory: Denies problems        Objective    /70 (BP Location: Right arm, Patient Position: Sitting, Cuff Size: Adult Large)   Pulse 64   Temp 97.7  F (36.5  C) (Temporal)   Resp 18   Ht 1.664 m (5' 5.5\")   Wt 85.7 kg (189 lb)   BMI 30.97 kg/m    Body mass index is 30.97 kg/m .  Physical Exam   General Appearance: Alert. No acute distress  Chest/Respiratory Exam: Clear to auscultation bilaterally  Cardiovascular Exam: Regular rate and rhythm. S1, S2, no murmur, gallop, or rubs.  Extremities: No lower extremity edema.  Foot Exam: Left and right foot: monofilament sensation normal, good pedal pulses, no lesions, nail hygiene good.  Tender in right midfoot, worse with inversion and eversion. No increase in pain with resisted ROM. Pain with compression of metatarsal heads.  Psychiatric: Normal affect and mentation      Results for orders placed or performed in visit on 03/30/22   Lipid Profile     Status: Abnormal   Result Value Ref Range    Cholesterol 261 (H) <200 mg/dL    Triglycerides 481 (H) <150 mg/dL    Direct Measure HDL 35 23 - 92 mg/dL    LDL Cholesterol Calculated      Non HDL Cholesterol 226 (H) <130 mg/dL    Patient Fasting > 8hrs? Yes     Narrative    Cholesterol  Desirable:  <200 mg/dL    Triglycerides  Normal:  Less than 150 mg/dL  Borderline High:  150-199 mg/dL  High:  200-499 mg/dL  Very High:  Greater than or equal to 500 mg/dL    Direct Measure HDL  Female:  Greater than or equal to 50 mg/dL   Male:  Greater than or equal to 40 mg/dL    LDL Cholesterol  Desirable:  <100mg/dL  Above Desirable:  100-129 mg/dL   Borderline High:  130-159 mg/dL   High:  160-189 mg/dL   Very High:  >= 190 mg/dL    Non HDL Cholesterol  Desirable:  130 mg/dL  Above Desirable:  130-159 mg/dL  Borderline High:  160-189 mg/dL  High:  190-219 mg/dL  Very High:  Greater " than or equal to 220 mg/dL   Hemoglobin A1c     Status: Normal   Result Value Ref Range    Hemoglobin A1C 6.0 4.0 - 6.2 %   Comprehensive metabolic panel     Status: Abnormal   Result Value Ref Range    Sodium 136 134 - 144 mmol/L    Potassium 4.6 3.5 - 5.1 mmol/L    Chloride 99 98 - 107 mmol/L    Carbon Dioxide (CO2) 29 21 - 31 mmol/L    Anion Gap 8 3 - 14 mmol/L    Urea Nitrogen 24 7 - 25 mg/dL    Creatinine 0.90 0.60 - 1.20 mg/dL    Calcium 9.8 8.6 - 10.3 mg/dL    Glucose 123 (H) 70 - 105 mg/dL    Alkaline Phosphatase 73 34 - 104 U/L    AST 16 13 - 39 U/L    ALT 13 7 - 52 U/L    Protein Total 7.3 6.4 - 8.9 g/dL    Albumin 4.3 3.5 - 5.7 g/dL    Bilirubin Total 0.4 0.3 - 1.0 mg/dL    GFR Estimate 76 >60 mL/min/1.73m2   Albumin Random Urine Quantitative with Creat Ratio     Status: None   Result Value Ref Range    Creatinine Urine mg/dL 67 mg/dL    Albumin Urine mg/L 11 mg/L    Albumin Urine mg/g Cr 16.42 0.00 - 25.00 mg/g Cr

## 2022-03-30 NOTE — NURSING NOTE
"Patient presents to the clinic for medication management.    FOOD SECURITY SCREENING QUESTIONS:    The next two questions are to help us understand your food security.  If you are feeling you need any assistance in this area, we have resources available to support you today.    Hunger Vital Signs:  Within the past 12 months we worried whether our food would run out before we got money to buy more. Never  Within the past 12 months the food we bought just didn't last and we didn't have money to get more. Never    Advance Care Directive on file? no  Advance Care Directive provided to patient? yes     Lab Results   Component Value Date    A1C 5.8 03/24/2020    A1C 5.8 12/03/2018    A1C 5.8 05/11/2018    ALBUMIN 3.8 09/17/2017     Previous A1C is at goal of <8  Date of last Foot exam 3-, declined today  Eye exam No  Patient is a Tobacco User  Patient is not on a daily aspirin  Patient is on a Statin.  Blood pressure today of:     BP Readings from Last 1 Encounters:   11/03/20 120/78      is at the goal of <139/89 for diabetics.    Chief Complaint   Patient presents with     Recheck Medication       Initial /70 (BP Location: Right arm, Patient Position: Sitting, Cuff Size: Adult Large)   Pulse 64   Temp 97.7  F (36.5  C) (Temporal)   Resp 18   Ht 1.664 m (5' 5.5\")   Wt 85.7 kg (189 lb)   BMI 30.97 kg/m   Estimated body mass index is 30.97 kg/m  as calculated from the following:    Height as of this encounter: 1.664 m (5' 5.5\").    Weight as of this encounter: 85.7 kg (189 lb).  Medication Reconciliation: complete        Natasha Damon LPN        "

## 2022-03-30 NOTE — LETTER
March 31, 2022      Alejandra Villegas  [address]   [facility] [id number]      Dear ,    Your lab results are included below. It appears that you are not taking atorvastatin, which is a medication for cholesterol on your medication list. The cholesterol values are 90 points higher than in the past. I sent in a new prescription for atorvastatin 40 mg daily.    Diabetes appears stable. Electrolytes, liver, and kidney tests are fine.  The urine test is normal showing no extra protein in your urine.    Resulted Orders   Lipid Profile   Result Value Ref Range    Cholesterol 261 (H) <200 mg/dL    Triglycerides 481 (H) <150 mg/dL    Direct Measure HDL 35 23 - 92 mg/dL    LDL Cholesterol Calculated        Comment:      Cannot estimate LDL when triglyceride exceeds 400 mg/dL    Non HDL Cholesterol 226 (H) <130 mg/dL    Patient Fasting > 8hrs? Yes     Narrative    Cholesterol  Desirable:  <200 mg/dL    Triglycerides  Normal:  Less than 150 mg/dL  Borderline High:  150-199 mg/dL  High:  200-499 mg/dL  Very High:  Greater than or equal to 500 mg/dL    Direct Measure HDL  Female:  Greater than or equal to 50 mg/dL   Male:  Greater than or equal to 40 mg/dL    LDL Cholesterol  Desirable:  <100mg/dL  Above Desirable:  100-129 mg/dL   Borderline High:  130-159 mg/dL   High:  160-189 mg/dL   Very High:  >= 190 mg/dL    Non HDL Cholesterol  Desirable:  130 mg/dL  Above Desirable:  130-159 mg/dL  Borderline High:  160-189 mg/dL  High:  190-219 mg/dL  Very High:  Greater than or equal to 220 mg/dL   Hemoglobin A1c   Result Value Ref Range    Hemoglobin A1C 6.0 4.0 - 6.2 %   Comprehensive metabolic panel   Result Value Ref Range    Sodium 136 134 - 144 mmol/L    Potassium 4.6 3.5 - 5.1 mmol/L    Chloride 99 98 - 107 mmol/L    Carbon Dioxide (CO2) 29 21 - 31 mmol/L    Anion Gap 8 3 - 14 mmol/L    Urea Nitrogen 24 7 - 25 mg/dL    Creatinine 0.90 0.60 - 1.20 mg/dL    Calcium 9.8 8.6 - 10.3 mg/dL    Glucose 123 (H) 70 - 105 mg/dL     Alkaline Phosphatase 73 34 - 104 U/L    AST 16 13 - 39 U/L    ALT 13 7 - 52 U/L    Protein Total 7.3 6.4 - 8.9 g/dL    Albumin 4.3 3.5 - 5.7 g/dL    Bilirubin Total 0.4 0.3 - 1.0 mg/dL    GFR Estimate 76 >60 mL/min/1.73m2   Albumin Random Urine Quantitative with Creat Ratio   Result Value Ref Range    Creatinine Urine mg/dL 67 mg/dL    Albumin Urine mg/L 11 mg/L    Albumin Urine mg/g Cr 16.42 0.00 - 25.00 mg/g Cr     If you have any questions or concerns, please call the clinic at the number listed above.       Sincerely,    Dipak Welsh MD

## 2022-03-30 NOTE — PATIENT INSTRUCTIONS
Refilled medications    Referral to Dr Nathan (foot and ankle surgeon)  Trying to get MRI from [place] before appointment     Consider a return for a physical and discuss cancer screenings

## 2022-03-31 PROBLEM — N18.2 CKD (CHRONIC KIDNEY DISEASE) STAGE 2, GFR 60-89 ML/MIN: Status: ACTIVE | Noted: 2022-03-31

## 2022-03-31 RX ORDER — ATORVASTATIN CALCIUM 40 MG/1
40 TABLET, FILM COATED ORAL AT BEDTIME
Qty: 90 TABLET | Refills: 4 | Status: SHIPPED | OUTPATIENT
Start: 2022-03-31 | End: 2023-08-15

## 2022-03-31 ASSESSMENT — ANXIETY QUESTIONNAIRES: GAD7 TOTAL SCORE: 0

## 2022-04-14 DIAGNOSIS — J45.30 MILD PERSISTENT ASTHMA WITHOUT COMPLICATION: ICD-10-CM

## 2022-04-14 RX ORDER — ALBUTEROL SULFATE 90 UG/1
2 AEROSOL, METERED RESPIRATORY (INHALATION) EVERY 4 HOURS PRN
Qty: 18 G | Refills: 1 | Status: SHIPPED | OUTPATIENT
Start: 2022-04-14 | End: 2022-06-05

## 2022-04-14 NOTE — TELEPHONE ENCOUNTER
albuterol (PROAIR HFA/PROVENTIL HFA/VENTOLIN HFA) 108 (90 Base) MCG/ACT inhaler     Sig: Inhale 2 puffs into the lungs every 4 hours as needed           Last Written Prescription Date:  3/30/22  Last Fill Quantity: 18g,   # refills: 1  Last Office Visit: 3/30/22  Future Office visit:       Routing refill request to provider for review/approval because:  Drug not on the [facility] or [facility] refill protocol or controlled substance  Last filled on 4/13/22, pharmacy requesting RX to have on file. Steffany Bowen RN on 4/14/2022 at 11:03 AM

## 2022-04-22 DIAGNOSIS — M79.671 RIGHT FOOT PAIN: Primary | ICD-10-CM

## 2022-04-28 ENCOUNTER — HOSPITAL ENCOUNTER (OUTPATIENT)
Dept: GENERAL RADIOLOGY | Facility: OTHER | Age: 57
Discharge: HOME | End: 2022-04-28
Attending: PODIATRIST
Payer: MEDICARE

## 2022-04-28 ENCOUNTER — OFFICE VISIT (OUTPATIENT)
Dept: ORTHOPEDICS | Facility: OTHER | Age: 57
End: 2022-04-28
Attending: FAMILY MEDICINE
Payer: MEDICARE

## 2022-04-28 VITALS — OXYGEN SATURATION: 95 % | HEART RATE: 72 BPM

## 2022-04-28 DIAGNOSIS — S92.811S CLOSED FRACTURE OF SESAMOID BONE OF RIGHT FOOT, SEQUELA: Primary | ICD-10-CM

## 2022-04-28 DIAGNOSIS — M79.671 RIGHT FOOT PAIN: ICD-10-CM

## 2022-04-28 PROCEDURE — 99203 OFFICE O/P NEW LOW 30 MIN: CPT | Performed by: PODIATRIST

## 2022-04-28 PROCEDURE — G0463 HOSPITAL OUTPT CLINIC VISIT: HCPCS

## 2022-04-28 PROCEDURE — 73630 X-RAY EXAM OF FOOT: CPT | Mod: RT

## 2022-04-28 ASSESSMENT — PAIN SCALES - GENERAL: PAINLEVEL: EXTREME PAIN (9)

## 2022-04-28 NOTE — PROGRESS NOTES
Patient is here for consult on her right foot pain.  Ameena Pierce LPN .....................4/28/2022 8:49 AM
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
## 2022-04-29 ENCOUNTER — TELEPHONE (OUTPATIENT)
Dept: FAMILY MEDICINE | Facility: OTHER | Age: 57
End: 2022-04-29
Payer: MEDICARE

## 2022-04-29 DIAGNOSIS — S92.811K CLOSED FRACTURE OF SESAMOID BONE OF RIGHT FOOT WITH NONUNION, SUBSEQUENT ENCOUNTER: Primary | ICD-10-CM

## 2022-04-29 RX ORDER — HYDROCODONE BITARTRATE AND ACETAMINOPHEN 5; 325 MG/1; MG/1
1 TABLET ORAL DAILY PRN
Qty: 5 TABLET | Refills: 0 | Status: SHIPPED | OUTPATIENT
Start: 2022-04-29 | End: 2022-05-04

## 2022-04-29 NOTE — TELEPHONE ENCOUNTER
Patient informed that Ortho Nursing Staff will send a message to an associate of Dr. Nathan.      Natasha Damon LPN 4/29/2022 3:26 PM

## 2022-04-29 NOTE — TELEPHONE ENCOUNTER
I sent in 5 hydrocodone for her to use 1 per day which seems more than fair since Dr Nathan would not prescribe and this has been a chronic issue.

## 2022-04-29 NOTE — TELEPHONE ENCOUNTER
Please call the patient.   She needs an RX for pain.         Yarelis Davila on 4/29/2022 at 8:08 AM

## 2022-04-29 NOTE — TELEPHONE ENCOUNTER
Dr. Nathan will not prescribe any pain medications to patient until she has surgery.  Surgery is scheduled for Thursday, May 5th.      Natasha Damon LPN 4/29/2022 3:44 PM

## 2022-05-02 ENCOUNTER — OFFICE VISIT (OUTPATIENT)
Dept: FAMILY MEDICINE | Facility: OTHER | Age: 57
End: 2022-05-02
Attending: PODIATRIST
Payer: MEDICARE

## 2022-05-02 VITALS
WEIGHT: 191 LBS | HEART RATE: 72 BPM | DIASTOLIC BLOOD PRESSURE: 80 MMHG | HEIGHT: 65 IN | TEMPERATURE: 97.2 F | OXYGEN SATURATION: 95 % | SYSTOLIC BLOOD PRESSURE: 130 MMHG | RESPIRATION RATE: 16 BRPM | BODY MASS INDEX: 31.82 KG/M2

## 2022-05-02 DIAGNOSIS — S92.811K CLOSED FRACTURE OF SESAMOID BONE OF RIGHT FOOT WITH NONUNION, SUBSEQUENT ENCOUNTER: ICD-10-CM

## 2022-05-02 DIAGNOSIS — E78.5 DYSLIPIDEMIA: ICD-10-CM

## 2022-05-02 DIAGNOSIS — I10 PRIMARY HYPERTENSION: ICD-10-CM

## 2022-05-02 DIAGNOSIS — J45.30 MILD PERSISTENT ASTHMA WITHOUT COMPLICATION: ICD-10-CM

## 2022-05-02 DIAGNOSIS — Z20.822 COVID-19 RULED OUT: Primary | ICD-10-CM

## 2022-05-02 DIAGNOSIS — Z01.818 PREOP GENERAL PHYSICAL EXAM: Primary | ICD-10-CM

## 2022-05-02 DIAGNOSIS — M51.379 DEGENERATIVE DISC DISEASE AT L5-S1 LEVEL: ICD-10-CM

## 2022-05-02 DIAGNOSIS — G43.009 MIGRAINE WITHOUT AURA AND WITHOUT STATUS MIGRAINOSUS, NOT INTRACTABLE: ICD-10-CM

## 2022-05-02 DIAGNOSIS — F32.A DEPRESSION, UNSPECIFIED DEPRESSION TYPE: ICD-10-CM

## 2022-05-02 DIAGNOSIS — E11.9 CONTROLLED TYPE 2 DIABETES MELLITUS WITHOUT COMPLICATION, WITHOUT LONG-TERM CURRENT USE OF INSULIN (H): ICD-10-CM

## 2022-05-02 LAB
HGB BLD-MCNC: 14.7 G/DL (ref 11.7–15.7)
PLATELET # BLD AUTO: 347 10E3/UL (ref 150–450)

## 2022-05-02 PROCEDURE — 93010 ELECTROCARDIOGRAM REPORT: CPT | Performed by: INTERNAL MEDICINE

## 2022-05-02 PROCEDURE — 85018 HEMOGLOBIN: CPT | Mod: ZL | Performed by: FAMILY MEDICINE

## 2022-05-02 PROCEDURE — 93005 ELECTROCARDIOGRAM TRACING: CPT | Performed by: FAMILY MEDICINE

## 2022-05-02 PROCEDURE — 36415 COLL VENOUS BLD VENIPUNCTURE: CPT | Mod: ZL | Performed by: FAMILY MEDICINE

## 2022-05-02 PROCEDURE — G0463 HOSPITAL OUTPT CLINIC VISIT: HCPCS

## 2022-05-02 PROCEDURE — U0005 INFEC AGEN DETEC AMPLI PROBE: HCPCS | Mod: ZL

## 2022-05-02 PROCEDURE — 85049 AUTOMATED PLATELET COUNT: CPT | Mod: ZL | Performed by: FAMILY MEDICINE

## 2022-05-02 PROCEDURE — C9803 HOPD COVID-19 SPEC COLLECT: HCPCS

## 2022-05-02 PROCEDURE — 99214 OFFICE O/P EST MOD 30 MIN: CPT | Performed by: FAMILY MEDICINE

## 2022-05-02 ASSESSMENT — PAIN SCALES - GENERAL: PAINLEVEL: WORST PAIN (10)

## 2022-05-02 NOTE — NURSING NOTE
"Chief Complaint   Patient presents with     Pre-Op Exam     Would like something to stop smoking sent to the pharmacy.    Initial /80   Pulse 72   Temp 97.2  F (36.2  C) (Tympanic)   Resp 16   Ht 1.651 m (5' 5\")   Wt 86.6 kg (191 lb)   LMP  (LMP Unknown)   SpO2 95%   Breastfeeding No   BMI 31.78 kg/m   Estimated body mass index is 31.78 kg/m  as calculated from the following:    Height as of this encounter: 1.651 m (5' 5\").    Weight as of this encounter: 86.6 kg (191 lb).         Norma J. Gosselin, JEAN   "
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
## 2022-05-02 NOTE — PROGRESS NOTES
Patient here today for Covid test. Procedure on 5/5/2022.     Yarelis Rodriguez CNA .............................on 5/2/2022 at 10:57 AM

## 2022-05-03 LAB
ATRIAL RATE - MUSE: 62 BPM
DIASTOLIC BLOOD PRESSURE - MUSE: NORMAL MMHG
INTERPRETATION ECG - MUSE: NORMAL
P AXIS - MUSE: 70 DEGREES
PR INTERVAL - MUSE: 144 MS
QRS DURATION - MUSE: 72 MS
QT - MUSE: 422 MS
QTC - MUSE: 428 MS
R AXIS - MUSE: 2 DEGREES
SARS-COV-2 RNA RESP QL NAA+PROBE: NEGATIVE
SYSTOLIC BLOOD PRESSURE - MUSE: NORMAL MMHG
T AXIS - MUSE: 29 DEGREES
VENTRICULAR RATE- MUSE: 62 BPM

## 2022-05-04 ENCOUNTER — TELEPHONE (OUTPATIENT)
Dept: FAMILY MEDICINE | Facility: OTHER | Age: 57
End: 2022-05-04

## 2022-05-04 ENCOUNTER — ANESTHESIA EVENT (OUTPATIENT)
Dept: SURGERY | Facility: OTHER | Age: 57
End: 2022-05-04
Payer: MEDICARE

## 2022-05-04 NOTE — TELEPHONE ENCOUNTER
Patient is having surgery 5/5/22 please finish and sign.  Norma J. Gosselin, LPN .......  5/4/2022  12:49 PM

## 2022-05-05 ENCOUNTER — HOSPITAL ENCOUNTER (OUTPATIENT)
Facility: OTHER | Age: 57
Discharge: HOME | End: 2022-05-05
Attending: PODIATRIST | Admitting: PODIATRIST
Payer: MEDICARE

## 2022-05-05 ENCOUNTER — ANESTHESIA (OUTPATIENT)
Dept: SURGERY | Facility: OTHER | Age: 57
End: 2022-05-05
Payer: MEDICARE

## 2022-05-05 ENCOUNTER — HOSPITAL ENCOUNTER (OUTPATIENT)
Dept: GENERAL RADIOLOGY | Facility: OTHER | Age: 57
Discharge: HOME | End: 2022-05-05
Attending: PODIATRIST | Admitting: PODIATRIST
Payer: MEDICARE

## 2022-05-05 VITALS
DIASTOLIC BLOOD PRESSURE: 74 MMHG | SYSTOLIC BLOOD PRESSURE: 114 MMHG | HEART RATE: 69 BPM | RESPIRATION RATE: 16 BRPM | WEIGHT: 191 LBS | TEMPERATURE: 97.1 F | OXYGEN SATURATION: 93 % | BODY MASS INDEX: 31.78 KG/M2

## 2022-05-05 DIAGNOSIS — Z98.1 HX OF SURGICAL FUSION JOINT: ICD-10-CM

## 2022-05-05 DIAGNOSIS — G89.18 POST-OP PAIN: Primary | ICD-10-CM

## 2022-05-05 LAB — GLUCOSE BLDC GLUCOMTR-MCNC: 109 MG/DL (ref 70–99)

## 2022-05-05 PROCEDURE — C1713 ANCHOR/SCREW BN/BN,TIS/BN: HCPCS | Performed by: PODIATRIST

## 2022-05-05 PROCEDURE — 710N000012 HC RECOVERY PHASE 2, PER MINUTE: Performed by: PODIATRIST

## 2022-05-05 PROCEDURE — 82962 GLUCOSE BLOOD TEST: CPT

## 2022-05-05 PROCEDURE — 28315 REMOVAL OF SESAMOID BONE: CPT | Mod: RT | Performed by: PODIATRIST

## 2022-05-05 PROCEDURE — 999N000180 XR SURGERY CARM FLUORO LESS THAN 5 MIN

## 2022-05-05 PROCEDURE — 370N000017 HC ANESTHESIA TECHNICAL FEE, PER MIN: Performed by: PODIATRIST

## 2022-05-05 PROCEDURE — 28750 FUSION OF BIG TOE JOINT: CPT | Performed by: NURSE ANESTHETIST, CERTIFIED REGISTERED

## 2022-05-05 PROCEDURE — 250N000009 HC RX 250: Performed by: NURSE ANESTHETIST, CERTIFIED REGISTERED

## 2022-05-05 PROCEDURE — 258N000003 HC RX IP 258 OP 636: Performed by: NURSE ANESTHETIST, CERTIFIED REGISTERED

## 2022-05-05 PROCEDURE — 999N000141 HC STATISTIC PRE-PROCEDURE NURSING ASSESSMENT: Performed by: PODIATRIST

## 2022-05-05 PROCEDURE — 250N000011 HC RX IP 250 OP 636: Performed by: PODIATRIST

## 2022-05-05 PROCEDURE — 28750 FUSION OF BIG TOE JOINT: CPT | Mod: RT | Performed by: PODIATRIST

## 2022-05-05 PROCEDURE — 250N000013 HC RX MED GY IP 250 OP 250 PS 637: Performed by: NURSE ANESTHETIST, CERTIFIED REGISTERED

## 2022-05-05 PROCEDURE — 360N000083 HC SURGERY LEVEL 3 W/ FLUORO, PER MIN: Performed by: PODIATRIST

## 2022-05-05 PROCEDURE — 250N000009 HC RX 250: Performed by: PODIATRIST

## 2022-05-05 PROCEDURE — 272N000001 HC OR GENERAL SUPPLY STERILE: Performed by: PODIATRIST

## 2022-05-05 PROCEDURE — 250N000011 HC RX IP 250 OP 636: Performed by: NURSE ANESTHETIST, CERTIFIED REGISTERED

## 2022-05-05 DEVICE — 3.0 X 16MM VAL SCREW, TI
Type: IMPLANTABLE DEVICE | Site: ANKLE | Status: FUNCTIONAL
Brand: ARTHREX®

## 2022-05-05 DEVICE — QCKFIX SCRW,TI,CANC.,PT,3.0X 28MM
Type: IMPLANTABLE DEVICE | Site: ANKLE | Status: NON-FUNCTIONAL
Brand: ARTHREX®
Removed: 2023-08-10

## 2022-05-05 DEVICE — 3.0 X 14MM VAL SCREW, TI
Type: IMPLANTABLE DEVICE | Site: ANKLE | Status: FUNCTIONAL
Brand: ARTHREX®

## 2022-05-05 RX ORDER — KETOROLAC TROMETHAMINE 30 MG/ML
INJECTION, SOLUTION INTRAMUSCULAR; INTRAVENOUS PRN
Status: DISCONTINUED | OUTPATIENT
Start: 2022-05-05 | End: 2022-05-05

## 2022-05-05 RX ORDER — ACETAMINOPHEN 325 MG/1
1000 TABLET ORAL EVERY 8 HOURS
Qty: 45 TABLET | Refills: 0 | Status: SHIPPED | OUTPATIENT
Start: 2022-05-05 | End: 2022-05-10

## 2022-05-05 RX ORDER — LABETALOL HYDROCHLORIDE 5 MG/ML
10 INJECTION, SOLUTION INTRAVENOUS
Status: DISCONTINUED | OUTPATIENT
Start: 2022-05-05 | End: 2022-05-05 | Stop reason: HOSPADM

## 2022-05-05 RX ORDER — SODIUM CHLORIDE, SODIUM LACTATE, POTASSIUM CHLORIDE, CALCIUM CHLORIDE 600; 310; 30; 20 MG/100ML; MG/100ML; MG/100ML; MG/100ML
INJECTION, SOLUTION INTRAVENOUS CONTINUOUS
Status: DISCONTINUED | OUTPATIENT
Start: 2022-05-05 | End: 2022-05-05 | Stop reason: HOSPADM

## 2022-05-05 RX ORDER — NALOXONE HYDROCHLORIDE 0.4 MG/ML
0.4 INJECTION, SOLUTION INTRAMUSCULAR; INTRAVENOUS; SUBCUTANEOUS
Status: DISCONTINUED | OUTPATIENT
Start: 2022-05-05 | End: 2022-05-05 | Stop reason: HOSPADM

## 2022-05-05 RX ORDER — LIDOCAINE HYDROCHLORIDE 20 MG/ML
INJECTION, SOLUTION INFILTRATION; PERINEURAL PRN
Status: DISCONTINUED | OUTPATIENT
Start: 2022-05-05 | End: 2022-05-05

## 2022-05-05 RX ORDER — CLINDAMYCIN PHOSPHATE 900 MG/50ML
900 INJECTION, SOLUTION INTRAVENOUS
Status: COMPLETED | OUTPATIENT
Start: 2022-05-05 | End: 2022-05-05

## 2022-05-05 RX ORDER — FENTANYL CITRATE 50 UG/ML
INJECTION, SOLUTION INTRAMUSCULAR; INTRAVENOUS PRN
Status: DISCONTINUED | OUTPATIENT
Start: 2022-05-05 | End: 2022-05-05

## 2022-05-05 RX ORDER — OXYCODONE HYDROCHLORIDE 5 MG/1
5 TABLET ORAL EVERY 4 HOURS PRN
Status: DISCONTINUED | OUTPATIENT
Start: 2022-05-05 | End: 2022-05-05 | Stop reason: HOSPADM

## 2022-05-05 RX ORDER — ONDANSETRON 4 MG/1
4 TABLET, ORALLY DISINTEGRATING ORAL EVERY 30 MIN PRN
Status: DISCONTINUED | OUTPATIENT
Start: 2022-05-05 | End: 2022-05-05 | Stop reason: HOSPADM

## 2022-05-05 RX ORDER — FENTANYL CITRATE 50 UG/ML
50 INJECTION, SOLUTION INTRAMUSCULAR; INTRAVENOUS
Status: DISCONTINUED | OUTPATIENT
Start: 2022-05-05 | End: 2022-05-05 | Stop reason: HOSPADM

## 2022-05-05 RX ORDER — OXYCODONE HYDROCHLORIDE 5 MG/1
5-10 TABLET ORAL EVERY 6 HOURS PRN
Qty: 16 TABLET | Refills: 0 | Status: ON HOLD | OUTPATIENT
Start: 2022-05-05 | End: 2023-08-10

## 2022-05-05 RX ORDER — LIDOCAINE 40 MG/G
CREAM TOPICAL
Status: DISCONTINUED | OUTPATIENT
Start: 2022-05-05 | End: 2022-05-05 | Stop reason: HOSPADM

## 2022-05-05 RX ORDER — MAGNESIUM HYDROXIDE 1200 MG/15ML
LIQUID ORAL PRN
Status: DISCONTINUED | OUTPATIENT
Start: 2022-05-05 | End: 2022-05-05 | Stop reason: HOSPADM

## 2022-05-05 RX ORDER — ASPIRIN 81 MG/1
81 TABLET ORAL 2 TIMES DAILY
Qty: 60 TABLET | Refills: 0 | Status: SHIPPED | OUTPATIENT
Start: 2022-05-05

## 2022-05-05 RX ORDER — HYDROMORPHONE HYDROCHLORIDE 1 MG/ML
0.4 INJECTION, SOLUTION INTRAMUSCULAR; INTRAVENOUS; SUBCUTANEOUS EVERY 5 MIN PRN
Status: DISCONTINUED | OUTPATIENT
Start: 2022-05-05 | End: 2022-05-05 | Stop reason: HOSPADM

## 2022-05-05 RX ORDER — ONDANSETRON 2 MG/ML
INJECTION INTRAMUSCULAR; INTRAVENOUS PRN
Status: DISCONTINUED | OUTPATIENT
Start: 2022-05-05 | End: 2022-05-05

## 2022-05-05 RX ORDER — ACETAMINOPHEN 10 MG/ML
INJECTION, SOLUTION INTRAVENOUS PRN
Status: DISCONTINUED | OUTPATIENT
Start: 2022-05-05 | End: 2022-05-05

## 2022-05-05 RX ORDER — NALOXONE HYDROCHLORIDE 0.4 MG/ML
0.2 INJECTION, SOLUTION INTRAMUSCULAR; INTRAVENOUS; SUBCUTANEOUS
Status: DISCONTINUED | OUTPATIENT
Start: 2022-05-05 | End: 2022-05-05 | Stop reason: HOSPADM

## 2022-05-05 RX ORDER — ONDANSETRON 2 MG/ML
4 INJECTION INTRAMUSCULAR; INTRAVENOUS EVERY 30 MIN PRN
Status: DISCONTINUED | OUTPATIENT
Start: 2022-05-05 | End: 2022-05-05 | Stop reason: HOSPADM

## 2022-05-05 RX ORDER — EPHEDRINE SULFATE 50 MG/ML
INJECTION, SOLUTION INTRAMUSCULAR; INTRAVENOUS; SUBCUTANEOUS PRN
Status: DISCONTINUED | OUTPATIENT
Start: 2022-05-05 | End: 2022-05-05

## 2022-05-05 RX ORDER — CLINDAMYCIN PHOSPHATE 900 MG/50ML
900 INJECTION, SOLUTION INTRAVENOUS SEE ADMIN INSTRUCTIONS
Status: DISCONTINUED | OUTPATIENT
Start: 2022-05-05 | End: 2022-05-05 | Stop reason: HOSPADM

## 2022-05-05 RX ORDER — HYDROXYZINE HYDROCHLORIDE 10 MG/1
10 TABLET, FILM COATED ORAL
Status: DISCONTINUED | OUTPATIENT
Start: 2022-05-05 | End: 2022-05-05 | Stop reason: HOSPADM

## 2022-05-05 RX ORDER — PROPOFOL 10 MG/ML
INJECTION, EMULSION INTRAVENOUS CONTINUOUS PRN
Status: DISCONTINUED | OUTPATIENT
Start: 2022-05-05 | End: 2022-05-05

## 2022-05-05 RX ORDER — OXYCODONE HYDROCHLORIDE 5 MG/1
5 TABLET ORAL
Status: DISCONTINUED | OUTPATIENT
Start: 2022-05-05 | End: 2022-05-05 | Stop reason: HOSPADM

## 2022-05-05 RX ORDER — MEPERIDINE HYDROCHLORIDE 50 MG/ML
12.5 INJECTION INTRAMUSCULAR; INTRAVENOUS; SUBCUTANEOUS
Status: DISCONTINUED | OUTPATIENT
Start: 2022-05-05 | End: 2022-05-05 | Stop reason: HOSPADM

## 2022-05-05 RX ORDER — FENTANYL CITRATE 50 UG/ML
50 INJECTION, SOLUTION INTRAMUSCULAR; INTRAVENOUS EVERY 5 MIN PRN
Status: DISCONTINUED | OUTPATIENT
Start: 2022-05-05 | End: 2022-05-05 | Stop reason: HOSPADM

## 2022-05-05 RX ADMIN — ACETAMINOPHEN 1000 MG: 10 INJECTION, SOLUTION INTRAVENOUS at 08:49

## 2022-05-05 RX ADMIN — ONDANSETRON HYDROCHLORIDE 4 MG: 2 SOLUTION INTRAMUSCULAR; INTRAVENOUS at 08:34

## 2022-05-05 RX ADMIN — Medication 10 MG: at 09:15

## 2022-05-05 RX ADMIN — PROPOFOL 100 MCG/KG/MIN: 10 INJECTION, EMULSION INTRAVENOUS at 08:34

## 2022-05-05 RX ADMIN — FENTANYL CITRATE 50 MCG: 50 INJECTION, SOLUTION INTRAMUSCULAR; INTRAVENOUS at 08:31

## 2022-05-05 RX ADMIN — SODIUM CHLORIDE, SODIUM LACTATE, POTASSIUM CHLORIDE, AND CALCIUM CHLORIDE: 600; 310; 30; 20 INJECTION, SOLUTION INTRAVENOUS at 09:14

## 2022-05-05 RX ADMIN — SODIUM CHLORIDE, SODIUM LACTATE, POTASSIUM CHLORIDE, AND CALCIUM CHLORIDE: 600; 310; 30; 20 INJECTION, SOLUTION INTRAVENOUS at 08:26

## 2022-05-05 RX ADMIN — OXYCODONE HYDROCHLORIDE 5 MG: 5 TABLET ORAL at 10:09

## 2022-05-05 RX ADMIN — FENTANYL CITRATE 25 MCG: 50 INJECTION, SOLUTION INTRAMUSCULAR; INTRAVENOUS at 08:51

## 2022-05-05 RX ADMIN — KETOROLAC TROMETHAMINE 30 MG: 30 INJECTION, SOLUTION INTRAMUSCULAR at 09:31

## 2022-05-05 RX ADMIN — LIDOCAINE HYDROCHLORIDE 60 MG: 20 INJECTION, SOLUTION INFILTRATION; PERINEURAL at 08:34

## 2022-05-05 RX ADMIN — FENTANYL CITRATE 25 MCG: 50 INJECTION, SOLUTION INTRAMUSCULAR; INTRAVENOUS at 08:44

## 2022-05-05 RX ADMIN — CLINDAMYCIN PHOSPHATE 900 MG: 900 INJECTION, SOLUTION INTRAVENOUS at 08:26

## 2022-05-05 RX ADMIN — Medication 10 MG: at 09:23

## 2022-05-05 RX ADMIN — MIDAZOLAM HYDROCHLORIDE 2 MG: 1 INJECTION, SOLUTION INTRAMUSCULAR; INTRAVENOUS at 08:31

## 2022-05-05 RX ADMIN — Medication 5 MG: at 09:27

## 2022-05-05 ASSESSMENT — LIFESTYLE VARIABLES: TOBACCO_USE: 1

## 2022-05-05 NOTE — BRIEF OP NOTE
[facility]    Brief Operative Note    Pre-operative diagnosis: Right foot pain [M79.671]  Closed fracture of sesamoid bone of right foot, sequela [S92.191S]  Post-operative diagnosis Same as pre-operative diagnosis    Procedure: Procedure(s):  Right fibular sesmoid excision and 1st metatarsal phalangeal joint fusion vs. metatarsal phalangeal joint capsular repair  Surgeon: Surgeon(s) and Role:     * Rl Nathan DPM - Primary  Anesthesia: Combined MAC with Local   Estimated Blood Loss: Minimal    Drains: None  Specimens: * No specimens in log *  Findings:   None.  Complications: None.  Implants:   Implant Name Type Inv. Item Serial No.  Lot No. LRB No. Used Action   IMP SCR ARTHREX JT 3.0X14MM TI [id number] - [id number] Metallic [employer]ware/[place] IMP SCR ARTHREX JT 3.0X14MM TI [id number]  ARTHREX  Right 3 Implanted   IMP SCR ARTHREX JT 3.0X16MM TI [id number]-16 - [id number] Metallic Hardware/[place] IMP SCR ARTHREX JT 3.0X16MM TI [id number]  ARTHREX  Right 2 Implanted   ARTHREX BB-RIMA, MTP      Right 1 Implanted   IMPLANT RECORD       1 Implanted

## 2022-05-05 NOTE — ANESTHESIA CARE TRANSFER NOTE
Patient: Alejandra Villegas    Procedure: Procedure(s):  Right fibular sesmoid excision and 1st metatarsal phalangeal joint fusion       Diagnosis: Right foot pain [M79.671]  Closed fracture of sesamoid bone of right foot, sequela [S92.811S]  Diagnosis Additional Information: No value filed.    Anesthesia Type:   MAC     Note:    Oropharynx: oropharynx clear of all foreign objects  Level of Consciousness: awake  Oxygen Supplementation: nasal cannula  Level of Supplemental Oxygen (L/min / FiO2): 2  Independent Airway: airway patency satisfactory and stable  Dentition: dentition unchanged  Vital Signs Stable: post-procedure vital signs reviewed and stable  Report to RN Given: handoff report given  Patient transferred to: Phase II    Handoff Report: Identifed the Patient, Identified the Reponsible Provider, Reviewed the pertinent medical history, Discussed the surgical course, Reviewed Intra-OP anesthesia mangement and issues during anesthesia, Set expectations for post-procedure period and Allowed opportunity for questions and acknowledgement of understanding      Vitals:  Vitals Value Taken Time   BP     Temp     Pulse     Resp     SpO2         Electronically Signed By: AMARILIS Mgcill CRNA  May 5, 2022  9:35 AM

## 2022-05-05 NOTE — OR NURSING
Alejandra has been discharged to home at 1031 via private car accompanied by Shanel, friend.    Written discharge instructions were provided to patient.  Prescriptions were picked up.  Patient states their pain is 5/10, medication given and tolerable, and denies any nausea or dizziness upon discharge.    Patient and adult caring for them verbalize understanding of discharge instructions including no driving until tomorrow and no longer taking narcotic pain medications - no operating mechanical equipment and no making any important decisions.They understand reason for discharge, and necessary follow-up appointments.

## 2022-05-05 NOTE — ANESTHESIA PREPROCEDURE EVALUATION
Anesthesia Pre-Procedure Evaluation    Patient: Alejandra Villegas   MRN: [id number] : 1965        Procedure : Procedure(s):  Right fibular sesmoid excision and 1st metatarsal phalangeal joint fusion vs. metatarsal phalangeal joint capsular repair          Past Medical History:   Diagnosis Date     Allergic rhinitis 2011          Disorder of kidney and ureter 2008    Kidney disease GFR 87     Dorsalgia     No Comments Provided     Excessive and frequent menstruation with regular cycle     Menorrhagia     Generalized anxiety disorder     No Comments Provided     Hidradenitis suppurativa     No Comments Provided     Hyperlipidemia     No Comments Provided     Obesity 2007    Obesity  Body-mass index over 30     Other disorders of lung (CODE) 2007    Pulmonary nodules, stable on follow-up chest CT .  No further imaging recommended.     Other specified disorders of Eustachian tube, unspecified ear 2012          Other specified phobia     No Comments Provided     Personal history of other medical treatment (CODE)     Childbirth x4     Rheumatoid arthritis (H) 2012          Tobacco use     No Comments Provided     Type 2 diabetes mellitus without complications (H) 2007          Uncomplicated asthma     No Comments Provided      Past Surgical History:   Procedure Laterality Date     ARTHROSCOPY KNEE  2017    Dr Torres     ARTHROSCOPY KNEE  2017    Dr Garza, lateral release, meniscal repair     ARTHROTOMY WRIST Left     Dr. Torres     CHOLECYSTECTOMY  2007    Emergency tracheotomy following failed intubation for laparoscopic cholecystectomy.     DILATION AND CURETTAGE  2006          HERNIA REPAIR  2007    Incisoinal hernia repair     HYSTERECTOMY TOTAL ABDOMINAL  2010          IMPLANT STIMULATOR AND LEADS SACRAL NERVE (STAGE ONE AND TWO) N/A 2018    Procedure: Interstim Battery Replacement;  Surgeon: Rl Ambriz MD;  Location:  OR      INTERSTIM DEVICE STAGE 2  01/06/2014    Dr. Ambriz - [facility]     LAPAROSCOPIC ABLATION ENDOMETRIOSIS  09/2006          OOPHORECTOMY Left 2010     Dr Thorpe     RELEASE CARPAL TUNNEL  02/02/2017          SALPINGO OOPHORECTOMY,R/L/KELSEY Right 06/1999    Right salpingo-oophorectomy for hemorrhagic corpus luteum cyst     TRACHEOSTOMY  2007    emergency following failed intubation during cholecystectomy     TYMPANOSTOMY, LOCAL/TOPICAL ANESTHESIA  08/2006    PE tube placement      Allergies   Allergen Reactions     Adhesive Tape      Bee Pollen Swelling     Seasonal     Folic Acid Itching     Pollen Extract      Seasonal     Amoxicillin Rash     Liquid Adhesive Rash     Nabumetone GI Disturbance     GI upset      Social History     Tobacco Use     Smoking status: Current Every Day Smoker     Packs/day: 1.00     Years: 32.00     Pack years: 32.00     Types: Cigarettes     Smokeless tobacco: Never Used   Substance Use Topics     Alcohol use: No     Alcohol/week: 0.0 standard drinks      Wt Readings from Last 1 Encounters:   05/02/22 86.6 kg (191 lb)        Anesthesia Evaluation   Pt has had prior anesthetic.     No history of anesthetic complications       ROS/MED HX  ENT/Pulmonary: Comment: Breathing is baseline    (+) allergic rhinitis, tobacco use, Current use, 1 packs/day, Intermittent, asthma Treatment: Inhaler prn,      Neurologic:       Cardiovascular:     (+) hypertension-----    METS/Exercise Tolerance: 4 - Raking leaves, gardening    Hematologic:       Musculoskeletal:   (+) arthritis,     GI/Hepatic:     (+) GERD, Asymptomatic on medication,     Renal/Genitourinary:     (+) renal disease,     Endo:     (+) type II DM, Obesity,     Psychiatric/Substance Use:       Infectious Disease:       Malignancy:       Other:            Physical Exam    Airway        Mallampati: III   TM distance: > 3 FB   Neck ROM: full   Mouth opening: > 3 cm    Respiratory Devices and Support         Dental       (+) upper dentures and  missing      Cardiovascular   cardiovascular exam normal       Rhythm and rate: regular and normal     Pulmonary   pulmonary exam normal        breath sounds clear to auscultation           OUTSIDE LABS:  CBC:   Lab Results   Component Value Date    WBC 8.2 03/24/2020    WBC 8.1 12/03/2018    HGB 14.7 05/02/2022    HGB 14.2 03/24/2020    HCT 41.1 03/24/2020    HCT 42.0 12/03/2018     05/02/2022     03/24/2020     BMP:   Lab Results   Component Value Date     03/30/2022     03/24/2020    POTASSIUM 4.6 03/30/2022    POTASSIUM 3.6 03/24/2020    CHLORIDE 99 03/30/2022    CHLORIDE 103 03/24/2020    CO2 29 03/30/2022    CO2 27 03/24/2020    BUN 24 03/30/2022    BUN 17 03/24/2020    CR 0.90 03/30/2022    CR 0.88 03/24/2020     (H) 03/30/2022     03/24/2020     COAGS: No results found for: PTT, INR, FIBR  POC: No results found for: BGM, HCG, HCGS  HEPATIC:   Lab Results   Component Value Date    ALBUMIN 4.3 03/30/2022    PROTTOTAL 7.3 03/30/2022    ALT 13 03/30/2022    AST 16 03/30/2022    ALKPHOS 73 03/30/2022    BILITOTAL 0.4 03/30/2022     OTHER:   Lab Results   Component Value Date    A1C 6.0 03/30/2022    TOBIN 9.8 03/30/2022       Anesthesia Plan    ASA Status:  3   NPO Status:  NPO Appropriate    Anesthesia Type: MAC.     - Reason for MAC: straight local not clinically adequate   Induction: Intravenous.   Maintenance: Balanced.        Consents    Anesthesia Plan(s) and associated risks, benefits, and realistic alternatives discussed. Questions answered and patient/representative(s) expressed understanding.     - Discussed: Risks, Benefits and Alternatives for BOTH SEDATION and the PROCEDURE were discussed     - Discussed with:  Patient         Postoperative Care    Pain management: IV analgesics, Multi-modal analgesia.   PONV prophylaxis: Ondansetron (or other 5HT-3)     Comments:    Other Comments: Risks, benefits and alternatives discussed and would like to proceed. General  anesthesia ok if indicated.             AMARILIS Mcgill CRNA

## 2022-05-05 NOTE — ANESTHESIA POSTPROCEDURE EVALUATION
Patient: Alejandra Villegas    Procedure: Procedure(s):  Right fibular sesmoid excision and 1st metatarsal phalangeal joint fusion       Anesthesia Type:  MAC    Note:  Disposition: Outpatient   Postop Pain Control: Uneventful            Sign Out: Well controlled pain   PONV: No   Neuro/Psych: Uneventful            Sign Out: Acceptable/Baseline neuro status   Airway/Respiratory: Uneventful            Sign Out: Acceptable/Baseline resp. status   CV/Hemodynamics: Uneventful            Sign Out: Acceptable CV status; No obvious hypovolemia; No obvious fluid overload   Other NRE: NONE   DID A NON-ROUTINE EVENT OCCUR? No           Last vitals:  Vitals Value Taken Time   /74 05/05/22 1000   Temp 97.1  F (36.2  C) 05/05/22 0938   Pulse 69 05/05/22 1000   Resp 16 05/05/22 0938   SpO2 95 % 05/05/22 1011   Vitals shown include unvalidated device data.    Electronically Signed By: AMARILIS SOMERS CRNA  May 5, 2022  10:32 AM

## 2022-05-05 NOTE — INTERVAL H&P NOTE
"I have reviewed the surgical (or preoperative) H&P that is linked to this encounter, and examined the patient. There are no significant changes    Clinical Conditions Present on Arrival:  Clinically Significant Risk Factors Present on Admission                   # Obesity: Estimated body mass index is 31.78 kg/m  as calculated from the following:    Height as of 5/2/22: 1.651 m (5' 5\").    Weight as of 5/2/22: 86.6 kg (191 lb).       "

## 2022-05-05 NOTE — OP NOTE
Procedure Date: 05/05/2022    SURGEON:  Rl Nathan MD    ASSISTANT:  Katherine Duque PA-C.  A skilled first assistant was necessary due to technical complexity of the procedure and for the patient's safety.  The assistant helped with positioning, retraction, visualization of the operative field and moreover helped to complete the procedure in a technically safe and efficient manner.      PREOPERATIVE DIAGNOSIS:  Right fibular sesamoid fracture metatarsophalangeal joint instability.    POSTOPERATIVE DIAGNOSIS:  Right fibular sesamoid fracture metatarsophalangeal joint instability.    PROCEDURE:  1.  Right fibular sesamoid excision.  2.  Right first metatarsophalangeal joint fusion.    ANESTHESIA:  MAC with local.    HEMOSTASIS:  Ankle tourniquet electrocautery.    ESTIMATED BLOOD LOSS:  Minimal.    MATERIALS:  Arthrex 3.0 cannulated screw, Arthrex first MPJ fusion plate with 3.0 locking and nonlocking screws.    INDICATIONS FOR PROCEDURE:  The patient has a history of first MPJ injury, has had longstanding pain, has a comminuted painful fibular sesamoid and instability of the joint.  Given failure of nonoperative management and extent of symptoms, would like to proceed with surgery as detailed after discussion of surgery, recovery, risks, potential complications, alternatives and benefits.    DESCRIPTION OF PROCEDURE:  In the supine position, utilized MAC anesthesia, local block performed.  Right lower extremity prepped and draped in the usual sterile fashion.  Ankle tourniquet utilized for duration of the procedure.    Attention directed to right foot.  Incision made dorsally medial to the EHL tendon, deepened using blunt and sharp dissection.  Capsular and periosteal tissue incised the length of the incision carefully freed from the joint.  Mobilizing the joint, I was able to use an Lorna or lamina -type retractor to distract and slightly plantarflex the first MPJ to allow access to the sesamoids.  The  fibular sesamoid was void of distal attachments to the capsule and ligamentous structures.  There was comminuted nonhealing fractures that were carefully dissected out and removed in total.  Flushed with copious amounts of normal saline.  At this time, the distraction of the joint was released and attention was directed to the joint itself.    Procedure two involved stabilization of the joint given the injury and the significant instability capsular injury.  We did elect to proceed to fuse this as well to avoid complications from sesamoid excision and the already unstable joint.  The joint was prepped for fusion by resecting the articular surface using flat cuts.  Good bleeding bones after the articular surface resected with a power saw.  Dorsal prominences were resected with rongeur, smoothed with a hand rasp.  The hallux was positioned appropriately and slightly dorsiflexed and just slightly abducted, fixed with a distal medial proximal lateral 3.0 cannulated compression screw augmented with a dorsal first MPJ fusion plate with distal locking screws, to use some compression through the plate as well per the plate design, and after we achieved compression, it was locked in proximally as well with two more screws.  Flushed with copious amounts of normal saline.  Deep tissue repaired with 2-0 Vicryl and skin with nonabsorbable suture.  Placed in sterile dressing and a Cam boot.  Given we were able to do both procedures from the dorsal aspect, there was no plantar incision, I will allow weightbearing in the boot as tolerated.  Follow up otherwise as scheduled.    Rl Nathan DPM        D: 2022   T: 2022   MT: RADHA    Name:     MAGGY ARNETT  MRN:      [id number]        Account:        [id number]   :      1965           Procedure Date: 2022     Document: [id number]

## 2022-05-07 ENCOUNTER — HOSPITAL ENCOUNTER (EMERGENCY)
Facility: OTHER | Age: 57
Discharge: HOME | End: 2022-05-07
Attending: STUDENT IN AN ORGANIZED HEALTH CARE EDUCATION/TRAINING PROGRAM | Admitting: STUDENT IN AN ORGANIZED HEALTH CARE EDUCATION/TRAINING PROGRAM
Payer: MEDICARE

## 2022-05-07 VITALS
SYSTOLIC BLOOD PRESSURE: 132 MMHG | OXYGEN SATURATION: 94 % | TEMPERATURE: 98.8 F | HEIGHT: 63 IN | RESPIRATION RATE: 16 BRPM | HEART RATE: 82 BPM | DIASTOLIC BLOOD PRESSURE: 83 MMHG | WEIGHT: 191 LBS | BODY MASS INDEX: 33.84 KG/M2

## 2022-05-07 DIAGNOSIS — M79.672 LEFT FOOT PAIN: ICD-10-CM

## 2022-05-07 PROCEDURE — 99282 EMERGENCY DEPT VISIT SF MDM: CPT | Performed by: STUDENT IN AN ORGANIZED HEALTH CARE EDUCATION/TRAINING PROGRAM

## 2022-05-08 NOTE — ED PROVIDER NOTES
History     Chief Complaint   Patient presents with     Post-op Problem     Right leg       HPI  Alejandra Villegas is a 56 year old woman with history of type 2 diabetes, GERD, hypertension, rheumatoid arthritis, CKD, tobacco use, and recent surgery of the right foot (specifically, fibular sesamoid excision and first metatarsal phalangeal joint fusion) 2 days ago with Dr. Nathan of [facility] who presents for evaluation of left foot pain.  She noted pain to the top of her left foot starting last night and worsening throughout the day, has been taking her prescribed Tylenol without much relief.  Pain in the right foot is also present status post surgery but overall improving, no pain in the calves or swelling or pain at level of ankles or higher.  She has not noticed any redness, but has noted some swelling to the top of her left foot as well.  No known trauma or injuries, she has been using crutches and not bearing weight on her right leg which is in a boot.  No pain to the bottom of her left foot.  She has a healed scratch to the top of her left foot that she states is many months old, no other abrasions or scratches or other recent injuries.    Allergies:  Allergies   Allergen Reactions     Adhesive Tape      Bee Pollen Swelling     Seasonal     Folic Acid Itching     Pollen Extract      Seasonal     Amoxicillin Rash     Liquid Adhesive Rash     Nabumetone GI Disturbance     GI upset       Problem List:    Patient Active Problem List    Diagnosis Date Noted     CKD (chronic kidney disease) stage 2, GFR 60-89 ml/min 03/31/2022     Priority: Medium     Osteopenia of right foot 02/03/2021     Priority: Medium     Anxiety state 01/30/2018     Priority: Medium     Other isolated or specific phobias 01/30/2018     Priority: Medium     Degenerative disc disease at L5-S1 level 01/30/2018     Priority: Medium     Dyslipidemia 01/30/2018     Priority: Medium     Overview:   low HDL       Hidradenitis  suppurativa 01/30/2018     Priority: Medium     Mild persistent asthma without complication 01/30/2018     Priority: Medium     Overview:   versus chronic obstructive pulmonary disease       Obesity 01/30/2018     Priority: Medium     Tobacco use disorder 01/30/2018     Priority: Medium     Gastroesophageal reflux disease 01/18/2017     Priority: Medium     Pain management contract broken 06/05/2015     Priority: Medium     Overview:   Contract violation - see 12/1/15 letter.       HTN (hypertension) 08/25/2014     Priority: Medium     Urge incontinence 06/04/2014     Priority: Medium     Alopecia areata 02/21/2014     Priority: Medium     Pyelonephritis due to Escherichia coli 06/08/2013     Priority: Medium     Benign paroxysmal positional vertigo 02/28/2013     Priority: Medium     Ovarian cyst, left 05/09/2012     Priority: Medium     Other acquired deformity of ankle and foot(736.79) 05/09/2012     Priority: Medium     RA (rheumatoid arthritis) (H) 05/09/2012     Priority: Medium     Overview:   Diagnosed Vera 2012       Asthma 05/04/2012     Priority: Medium     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     Priority: Medium     Seasonal allergic rhinitis 09/27/2011     Priority: Medium     Symptomatic menopausal or female climacteric states 09/21/2010     Priority: Medium     Abdominal pain, left lower quadrant 08/06/2010     Priority: Medium     Other diseases of lung, not elsewhere classified 08/01/2007     Priority: Medium     Diabetes mellitus type 2, controlled, without complications (H) 07/01/2007     Priority: Medium        Past Medical History:    Past Medical History:   Diagnosis Date     Allergic rhinitis 09/27/2011     Disorder of kidney and ureter 08/08/2008     Dorsalgia      Excessive and frequent menstruation with regular cycle      Generalized anxiety disorder      Hidradenitis suppurativa      Hyperlipidemia      Obesity 07/01/2007     Other disorders of lung (CODE)  08/01/2007     Other specified disorders of Eustachian tube, unspecified ear 02/27/2012     Other specified phobia      Personal history of other medical treatment (CODE)      Rheumatoid arthritis (H) 02/27/2012     Tobacco use      Type 2 diabetes mellitus without complications (H) 07/01/2007     Uncomplicated asthma        Past Surgical History:    Past Surgical History:   Procedure Laterality Date     ARTHROSCOPY KNEE  05/2017    Dr Torres     ARTHROSCOPY KNEE  07/20/2017    Dr Garza, lateral release, meniscal repair     ARTHROTOMY WRIST Left 2011    Dr. Torres     CHOLECYSTECTOMY  06/2007    Emergency tracheotomy following failed intubation for laparoscopic cholecystectomy.     DILATION AND CURETTAGE  09/2006          HERNIA REPAIR  2007    Incisoinal hernia repair     HYSTERECTOMY TOTAL ABDOMINAL  02/2010          IMPLANT STIMULATOR AND LEADS SACRAL NERVE (STAGE ONE AND TWO) N/A 12/11/2018    Procedure: Interstim Battery Replacement;  Surgeon: Rl Ambriz MD;  Location: [facility] OR     INTERSTIM DEVICE STAGE 2  01/06/2014    Dr. Ambriz [facility]     LAPAROSCOPIC ABLATION ENDOMETRIOSIS  09/2006          OOPHORECTOMY Left 2010     Dr Thorpe     RECONSTRUCT FOREFOOT WITH METATARSOPHALANGEAL (MTP) FUSION Right 5/5/2022    Procedure: Right fibular sesmoid excision and 1st metatarsal phalangeal joint fusion;  Surgeon: Rl Nathan DPM;  Location: [facility] OR     RELEASE CARPAL TUNNEL  02/02/2017          SALPINGO OOPHORECTOMY,R/L/KELSEY Right 06/1999    Right salpingo-oophorectomy for hemorrhagic corpus luteum cyst     TRACHEOSTOMY  2007    emergency following failed intubation during cholecystectomy     TYMPANOSTOMY, LOCAL/TOPICAL ANESTHESIA  08/2006    PE tube placement       Family History:    Family History   Problem Relation Age of Onset     Arthritis Mother         Arthritis,Rheumatoid     Cancer Mother         Cancer, lung and uterine cancer     Heart Disease Father         Heart Disease,CAD, CABG, peripheral vascular  "dise     Thyroid Disease Father         Thyroid Disease, hyperlipidemia     Other - See Comments Father         djd     Asthma Brother         Asthma     Asthma Sister         Asthma     Other - See Comments Sister         Psychiatric illness,Anxiety     Other - See Comments Son         x2 back surgeries     Breast Cancer No family hx of         Cancer-breast       Social History:  Marital Status:   [4]  Social History     Tobacco Use     Smoking status: Current Every Day Smoker     Packs/day: 1.00     Years: 32.00     Pack years: 32.00     Types: Cigarettes     Smokeless tobacco: Never Used   Vaping Use     Vaping Use: Never used   Substance Use Topics     Alcohol use: No     Alcohol/week: 0.0 standard drinks     Drug use: No        Medications:    acetaminophen (TYLENOL) 325 MG tablet  albuterol (PROAIR HFA/PROVENTIL HFA/VENTOLIN HFA) 108 (90 Base) MCG/ACT inhaler  albuterol (PROVENTIL) (2.5 MG/3ML) 0.083% neb solution  aspirin 81 MG EC tablet  atorvastatin (LIPITOR) 40 MG tablet  gabapentin (NEURONTIN) 400 MG capsule  ibuprofen (ADVIL/MOTRIN) 800 MG tablet  lisinopril (ZESTRIL) 10 MG tablet  metFORMIN (GLUCOPHAGE) 500 MG tablet  nicotine (NICODERM CQ) 14 MG/24HR 24 hr patch  nicotine (NICODERM CQ) 7 MG/24HR 24 hr patch  oxyCODONE (ROXICODONE) 5 MG tablet  PARoxetine (PAXIL) 20 MG tablet  SUMAtriptan (IMITREX) 50 MG tablet  order for DME          Review of Systems  Please see HPI above for pertinent positives and negatives.  All other systems reviewed and found to be negative.    Physical Exam   BP: 132/83  Pulse: 82  Temp: 98.8  F (37.1  C)  Resp: 16  Height: 160 cm (5' 3\")  Weight: 86.6 kg (191 lb)  SpO2: 94 %      Physical Exam  Gen: Lying in bed, alert, nontoxic, no acute distress  HEENT: Normocephalic and atraumatic, mucous membranes moist, conjunctiva clear  CV: Regular rate and rhythm, appears warm and well-perfused, strong distal pulses including DP and PT in the left lower extremity  Pulm: Normal " respiratory effort, no audible wheezing or stridor  Skin: No rash or other lesions, right foot bandaged and in a cam boot and this was not removed.  No erythema to dorsal aspect of right foot, there is an old linear scar secondary to remote abrasion/laceration many months ago per patient  MSK: No swelling or tenderness of the calves bilaterally or popliteal fossae or thighs, full range of motion both hips and knees.  Right foot in Cam boot of dressing, this was not removed for exam.  No significant tenderness to the dorsal aspect of left foot, pain to the dorsal aspect of the left foot worsened with plantarflexion of the toes, minimal to no swelling of dorsal aspect of left foot  Neuro: Alert and oriented x4, no focal deficits, distal CMS intact left lower extremity    ED Course              ED Course as of 05/07/22 2150   Sat May 07, 2022   1957 Patient evaluated, here with left dorsal foot pain and mild swelling over the last 24 hours; recently had surgery on left foot/ankle 2 days ago with podiatric surgery, has follow up 5/12. No calf pain or swelling, no other concerns, patient well on exam, afebrile here. No significant erythema on exam of left foot, no TTP, pain with plantar flexion of the toes only. Probable strain of foot, no indication for x-rays, has old healed scar from prior scratch from many months ago, no concern for inoculation of infection. Plan for discharge with close outpatient follow-up, strict return precautions     Procedures              Critical Care time:  none               No results found for this or any previous visit (from the past 24 hour(s)).    Medications - No data to display    Assessments & Plan (with Medical Decision Making)   56 year old woman with history of type 2 diabetes, GERD, hypertension, rheumatoid arthritis, CKD, tobacco use, and recent surgery of the right foot (specifically, fibular sesamoid excision and first metatarsal phalangeal joint fusion) 2 days ago with   [facility] who presents for evaluation of left foot pain, found to have likely strain of the foot. No tenderness or swelling/erythema to suggest superficial phlebitis, no involvement above the ankle to suggest DVT. Right foot post-op is without new discomfort/pain or complaint thus dressing left in place. Suspect foot strain related to repetitive use of non-dominant foot with frequent transfers since surgery. No need for imaging, fracture not suspected. Appropriate for discharge with close outpatient follow-up, strict return precautions reviewed.    From ED discharge instructions:  No dangerous cause of your foot pain was suspect today. You likely have a strain of your foot that should improve with rest and time.    Follow up with the podiatrist later this week as scheduled.    For your foot, continue your prescribed tylenol. Ice the foot 4 to 6 times daily as needed. Keep elevated when at rest as able.    Come back to the ER or call 911 if worsening swelling and pain above the ankle, new redness or fever, or any other acute concerns.    I have reviewed the nursing notes.    I have reviewed the findings, diagnosis, plan and need for follow up with the patient.       Discharge Medication List as of 5/7/2022  8:17 PM          Final diagnoses:   Left foot pain       5/7/2022   [facility] AND [facility] Daniel Yen MD  05/08/22 1930

## 2022-05-08 NOTE — ED TRIAGE NOTES
"ED Nursing Triage Note (General)   ________________________________    Alejandra Villegas is a 56 year old Female that presents to triage private car  With history of increased pain in right leg and swelling in both of her legs.  Pt is 2 days post op from Right fibular sesmoid excision and 1st metatarsal phalangeal joint fusion.  She is non wt bearing on her right foot at this time.   First noticed increased swelling in her legs yesterday and is concerned about a post op complication.    /83   Pulse 82   Temp 98.8  F (37.1  C)   Resp 16   Ht 1.6 m (5' 3\")   Wt 86.6 kg (191 lb)   LMP  (LMP Unknown)   SpO2 94%   BMI 33.83 kg/m  t  Patient appears alert  and oriented, in mild distress., and cooperative and pleasant behavior.    GCS Eye Opening = 4=Spontaneous  Airway: intact  Breathing noted as Normal.  Circulation Normal  Skin normal  Action taken:  Triage to critical care immediately      PRE HOSPITAL PRIOR LIVING SITUATION Other Relatives      "

## 2022-05-08 NOTE — DISCHARGE INSTRUCTIONS
No dangerous cause of your foot pain was suspect today. You likely have a strain of your foot that should improve with rest and time.    Follow up with the podiatrist later this week as scheduled.    For your foot, continue your prescribed tylenol. Ice the foot 4 to 6 times daily as needed. Keep elevated when at rest as able.    Come back to the ER or call 911 if worsening swelling and pain above the ankle, new redness or fever, or any other acute concerns.

## 2022-05-09 ENCOUNTER — TELEPHONE (OUTPATIENT)
Dept: ORTHOPEDICS | Facility: OTHER | Age: 57
End: 2022-05-09
Payer: MEDICARE

## 2022-05-09 NOTE — TELEPHONE ENCOUNTER
Patient is wanting to know if Dr. Nathan will refill her pain med from surgery on last Thursday 5/5/2022. She has a post op on 5/12/2022 but needs more till then.     [facility]     Nilda Marlow on 5/9/2022 at 10:11 AM

## 2022-05-11 DIAGNOSIS — Z98.1 HX OF SURGICAL FUSION JOINT: Primary | ICD-10-CM

## 2022-05-12 ENCOUNTER — OFFICE VISIT (OUTPATIENT)
Dept: ORTHOPEDICS | Facility: OTHER | Age: 57
End: 2022-05-12
Attending: PODIATRIST
Payer: MEDICARE

## 2022-05-12 ENCOUNTER — HOSPITAL ENCOUNTER (OUTPATIENT)
Dept: GENERAL RADIOLOGY | Facility: OTHER | Age: 57
Discharge: HOME | End: 2022-05-12
Attending: PODIATRIST
Payer: MEDICARE

## 2022-05-12 DIAGNOSIS — Z98.1 HX OF SURGICAL FUSION JOINT: ICD-10-CM

## 2022-05-12 DIAGNOSIS — Z09 POSTOPERATIVE FOLLOW-UP: Primary | ICD-10-CM

## 2022-05-12 PROCEDURE — G0463 HOSPITAL OUTPT CLINIC VISIT: HCPCS

## 2022-05-12 PROCEDURE — 99024 POSTOP FOLLOW-UP VISIT: CPT | Performed by: PHYSICIAN ASSISTANT

## 2022-05-12 PROCEDURE — 73630 X-RAY EXAM OF FOOT: CPT | Mod: RT

## 2022-05-12 NOTE — PROGRESS NOTES
Visit Date: 2022    Maggy comes in today for followup of her right foot.  She underwent a right fibular sesamoid excision and right first metatarsophalangeal joint fusion by Dr. Nathan on 2022.  The patient states that she is doing well.  She is currently in a cast boot with a postop dressing and using crutches to ambulate.  She has been elevating and icing her foot as much as possible and she has no acute concerns today.    PHYSICAL EXAMINATION:  On exam today, the incision is clean and dry.  There are no signs of infection.  Sutures are intact; those were removed today and Steri-Stripped.  She does have a moderate amount of swelling in her foot with some bruising as well.  She denies any numbness or tingling.  Denies any calf pain.    X-rays taken today, 3 views of her right foot show well-fixed, well-maintained hardware from the metatarsophalangeal joint fusion in satisfactory alignment.    ASSESSMENT:  Status post right fibular sesamoid excision, right first metatarsal joint fusion.    PLAN:  At this time, we will have her continue using her boot and weightbear as tolerated.  Elevate and ice.  She is able to shower at this time, but not soak her foot.  She can go back to work on 2022, but only in a sedentary position at this time.  We will follow up in 4 weeks' time.  We will get new x-rays of her right foot at that time weightbearing.    Rl Nathan DPM    As Dictated by GIOVANNI CULVER        D: 2022   T: 2022   MT: [id number]    Name:     MAGGY ARNETT  MRN:      [id number]        Account:    [id number]   :      1965           Visit Date: 2022     Document: [id number]

## 2022-05-12 NOTE — PROGRESS NOTES
Patient is here for follow up on her right foot.  Ameena Pierce LPN .....................5/12/2022 10:42 AM

## 2022-05-22 ENCOUNTER — OFFICE VISIT (OUTPATIENT)
Dept: FAMILY MEDICINE | Facility: OTHER | Age: 57
End: 2022-05-22
Attending: NURSE PRACTITIONER
Payer: MEDICARE

## 2022-05-22 VITALS
RESPIRATION RATE: 16 BRPM | DIASTOLIC BLOOD PRESSURE: 82 MMHG | SYSTOLIC BLOOD PRESSURE: 134 MMHG | OXYGEN SATURATION: 93 % | TEMPERATURE: 97.6 F | HEART RATE: 68 BPM

## 2022-05-22 DIAGNOSIS — S92.811K CLOSED FRACTURE OF SESAMOID BONE OF RIGHT FOOT WITH NONUNION, SUBSEQUENT ENCOUNTER: ICD-10-CM

## 2022-05-22 DIAGNOSIS — H66.002 LEFT ACUTE SUPPURATIVE OTITIS MEDIA: Primary | ICD-10-CM

## 2022-05-22 PROCEDURE — G0463 HOSPITAL OUTPT CLINIC VISIT: HCPCS | Performed by: PHYSICIAN ASSISTANT

## 2022-05-22 PROCEDURE — 99213 OFFICE O/P EST LOW 20 MIN: CPT | Performed by: PHYSICIAN ASSISTANT

## 2022-05-22 RX ORDER — AZITHROMYCIN 250 MG/1
TABLET, FILM COATED ORAL
Qty: 6 TABLET | Refills: 0 | Status: SHIPPED | OUTPATIENT
Start: 2022-05-22 | End: 2022-05-27

## 2022-05-22 RX ORDER — ACETAMINOPHEN 325 MG/1
975 TABLET ORAL EVERY 8 HOURS PRN
Qty: 45 TABLET | Refills: 0 | Status: SHIPPED | OUTPATIENT
Start: 2022-05-22 | End: 2022-05-27

## 2022-05-22 ASSESSMENT — PAIN SCALES - GENERAL: PAINLEVEL: EXTREME PAIN (9)

## 2022-05-22 NOTE — NURSING NOTE
Chief Complaint   Patient presents with     Ear Problem     Left    Patient presents to the clinic today for left ear pain that is rated at a 9. Patient states ear pain started about 3 or 4 days ago.  Medication Reconciliation: completed   Courtney Mcmahan LPN  5/22/2022 11:24 AM

## 2022-05-22 NOTE — PATIENT INSTRUCTIONS
"You were prescribed an antibiotic, please take into consideration the following information:  - Take entire course of antibiotic even if you start to feel better.  - Antibiotics can cause stomach upset including nausea and diarrhea. Read your bottle or ask the pharmacist if antibiotic can be taken with food to help prevent nausea. If you have symptoms of diarrhea you can take an over-the-counter probiotic and/or increase foods with probiotics such as yogurt, [place], sauerkraut.  -Use caution in sunlight as can lead to increased risk of sunburn while on ABX (antibiotics).     Please refer to your AVS for follow up and pain/symptoms management recommendations (I.e.: medications, helpful conservative treatment modalities, appropriate follow up if need to a specialist or family practice, etc.). Please return to urgent care if your symptoms change or worsen.     Discharge instructions:  -If you were prescribed a medication(s), please take this as prescribed/directed  -Monitor your symptoms, if changing/worsening, return to UC/ER or PCP for follow up    - For ear infection. Take entire course of antibiotics to ensure this clears (even if feeling better).  - Tylenol or ibuprofen for pain and fevers.   - Eat yogurt, kefir or take over-the-counter \"probiotic\" at least 2 hours before or after a dose of antibiotic. This will replace good bacteria that may have been lost due to the antibiotic. (This may also help to prevent yeast infections and upset stomach during the course of antibiotic.)  - In the future at onset of congestion: Blow nose or use bulb syringe to keep nasal congestion cleared and use saline nasal spray/flush.  -Alternative ibuprofen and tylenol as needed.   -Rest/relaxation and keeping hydrated with clear liquids (ie: water or gatorade). Using a humidifier may be beneficial as well.     * Recheck with family practice as needed or ER sooner with worsening or concerns.     "

## 2022-05-22 NOTE — LETTER
[facility]  [address]  [place] [address]  Phone: [phone]  Fax: [phone]    May 22, 2022        Alejandra Villegas  [address]  [place] [address]          To whom it may concern:    RE: Alejandra Villegas    Patient was seen and treated today at our clinic and missed work.    Please contact me for questions or concerns.      Sincerely,        Janette Claudio PA-C

## 2022-05-22 NOTE — PROGRESS NOTES
ASSESSMENT/PLAN:    I have reviewed the nursing notes.  I have reviewed the findings, diagnosis, plan and need for follow up with the patient.    1. Left acute suppurative otitis media  - azithromycin (ZITHROMAX) 250 MG tablet; Take 2 tablets (500 mg) by mouth daily for 1 day, THEN 1 tablet (250 mg) daily for 4 days.  Dispense: 6 tablet; Refill: 0  - Vital signs stable. PE consistent with OME/ear infection of left ear(s). Treatment of choice includes antibiotic regimen (Azithromycin, alternating tylenol and ibuprofen every 4-6 hours as needed (if able, daily limits reviewed in AVS and verbally with patient), warm compresses, other symptomatic remedies. Avoid trauma to ear(s) such as Q-tips. If symptoms change or worsen, recommend follow up for reevaluation (high fevers, worsening pain, abnormal drainage or odor from ear, etc.). Discussed if ear infections become increasingly common, as this point, a referral to ENT can be made, typically by PCP. Patient is in agreement and understanding of the above treatment plan. All questions and concerns were addressed and answered to patient's satisfaction. AVS reviewed with patient.     2. 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 Alternate every 4 hours as needed. I.e.: Ibuprofen at 8am, Tylenol 12pm, Ibuprofen 4pm    -Daily maximum of Tylenol is 4000mg (recommend staying under 3000mg)   -Daily maximum of Ibuprofen is 3200 mg  - Heat, ice, gentle stretching (among other remedies: biofreeze/icy hot, etc).     Discussed warning signs/symptoms indicative of need to f/u    Follow up if symptoms persist or worsen or concerns    I explained my diagnostic considerations and recommendations to  the patient, who voiced understanding and agreement with the treatment plan. All questions were answered. We discussed potential side effects of any prescribed or recommended therapies, as well as expectations for response to treatments.    Janette Claudio PA-C  5/22/2022  11:31 AM    HPI:    Alejandra Villegas is a 56 year old female  who presents to [facility] today for concerns of left ear pain x 4-5 days duration.     Presence of the following:   No fevers or chills.  No sore throat/pharyngitis/tonsillitis.   No allergy/URI Symptoms  Yes Muffled Sounds/Change in Hearing  Yes Sensation of Fullness in Ear(s)  No Ringing in Ears/Tinnitus  No Balance Changes  No Dizziness  Yes Headache   No Ear Drainage  Denies persistent hearing loss, foul smelling odor from ear, changes in vision, nausea, vomiting, diarrhea, chest pain, shortness of breath.     No Recent swimming/hot tub  No submerging of head in shower/bathtub.     No Recent URI or other illness  History of otitis media: No  History of HEENT surgery (PE tubes, tonsillectomy/adenoidectomy, etc.): Yes  Recent Course of ABX: No    Treatments Tried: Tylenol   Prior History of Similar Symptoms: No    PCP - Pablo    Past Medical History:   Diagnosis Date     Allergic rhinitis 09/27/2011          Disorder of kidney and ureter 08/08/2008    Kidney disease GFR 87     Dorsalgia     No Comments Provided     Excessive and frequent menstruation with regular cycle     Menorrhagia     Generalized anxiety disorder     No Comments Provided     Hidradenitis suppurativa     No Comments Provided     Hyperlipidemia     No Comments Provided     Obesity 07/01/2007    Obesity  Body-mass index over 30     Other disorders of lung (CODE) 08/01/2007    Pulmonary nodules, stable on follow-up chest CT 12/08.  No further imaging recommended.     Other specified disorders of Eustachian tube, unspecified ear 02/27/2012          Other specified phobia     No Comments Provided     Personal  history of other medical treatment (CODE)     Childbirth x4     Rheumatoid arthritis (H) 02/27/2012          Tobacco use     No Comments Provided     Type 2 diabetes mellitus without complications (H) 07/01/2007          Uncomplicated asthma     No Comments Provided     Past Surgical History:   Procedure Laterality Date     ARTHROSCOPY KNEE  05/2017    Dr Torres     ARTHROSCOPY KNEE  07/20/2017    Dr Garza, lateral release, meniscal repair     ARTHROTOMY WRIST Left 2011    Dr. Torres     CHOLECYSTECTOMY  06/2007    Emergency tracheotomy following failed intubation for laparoscopic cholecystectomy.     DILATION AND CURETTAGE  09/2006          HERNIA REPAIR  2007    Incisoinal hernia repair     HYSTERECTOMY TOTAL ABDOMINAL  02/2010          IMPLANT STIMULATOR AND LEADS SACRAL NERVE (STAGE ONE AND TWO) N/A 12/11/2018    Procedure: Interstim Battery Replacement;  Surgeon: Rl Ambriz MD;  Location: [facility] OR     INTERSTIM DEVICE STAGE 2  01/06/2014    Dr. Ambriz [facility]     LAPAROSCOPIC ABLATION ENDOMETRIOSIS  09/2006          OOPHORECTOMY Left 2010     Dr Thorpe     RECONSTRUCT FOREFOOT WITH METATARSOPHALANGEAL (MTP) FUSION Right 5/5/2022    Procedure: Right fibular sesmoid excision and 1st metatarsal phalangeal joint fusion;  Surgeon: Rl Nathan DPM;  Location: [facility] OR     RELEASE CARPAL TUNNEL  02/02/2017          SALPINGO OOPHORECTOMY,R/L/KELSEY Right 06/1999    Right salpingo-oophorectomy for hemorrhagic corpus luteum cyst     TRACHEOSTOMY  2007    emergency following failed intubation during cholecystectomy     TYMPANOSTOMY, LOCAL/TOPICAL ANESTHESIA  08/2006    PE tube placement     Social History     Tobacco Use     Smoking status: Current Every Day Smoker     Packs/day: 1.00     Years: 32.00     Pack years: 32.00     Types: Cigarettes     Smokeless tobacco: Never Used   Substance Use Topics     Alcohol use: No     Alcohol/week: 0.0 standard drinks     Current Outpatient Medications   Medication Sig Dispense  Refill     albuterol (PROAIR HFA/PROVENTIL HFA/VENTOLIN HFA) 108 (90 Base) MCG/ACT inhaler INHALE 2 PUFFS INTO THE LUNGS EVERY 4 HOURS AS NEEDED 18 g 1     albuterol (PROVENTIL) (2.5 MG/3ML) 0.083% neb solution Take 1 vial (2.5 mg) by nebulization every 6 hours as needed for shortness of breath / dyspnea or wheezing 60 mL 11     aspirin 81 MG EC tablet Take 1 tablet (81 mg) by mouth 2 times daily 60 tablet 0     atorvastatin (LIPITOR) 40 MG tablet Take 1 tablet (40 mg) by mouth At Bedtime 90 tablet 4     gabapentin (NEURONTIN) 400 MG capsule Take 2 capsules (800 mg) by mouth 2 times daily 360 capsule 4     ibuprofen (ADVIL/MOTRIN) 800 MG tablet TAKE 1 TABLET BY MOUTH THREE TIMES DAILY AS NEEDED 90 tablet 0     lisinopril (ZESTRIL) 10 MG tablet Take 1 tablet (10 mg) by mouth daily 90 tablet 4     metFORMIN (GLUCOPHAGE) 500 MG tablet TAKE 1 TABLET(500 MG) BY MOUTH EVERY DAY WITH BREAKFAST 90 tablet 4     nicotine (NICODERM CQ) 14 MG/24HR 24 hr patch Place 1 patch onto the skin every 24 hours 30 patch 1     nicotine (NICODERM CQ) 7 MG/24HR 24 hr patch Place 1 patch onto the skin every 24 hours 30 patch 1     order for DME Equipment being ordered: home neb set up with mask and tubing 1 Device 0     oxyCODONE (ROXICODONE) 5 MG tablet Take 1-2 tablets (5-10 mg) by mouth every 6 hours as needed for moderate to severe pain 16 tablet 0     PARoxetine (PAXIL) 20 MG tablet TAKE 1 TABLET(20 MG) BY MOUTH EVERY MORNING 90 tablet 4     SUMAtriptan (IMITREX) 50 MG tablet Take 1 tablet (50 mg) by mouth at onset of headache for migraine May repeat in 2 hours. Max 4 tablets/24 hours. 9 tablet 1     Allergies   Allergen Reactions     Adhesive Tape      Bee Pollen Swelling     Seasonal     Folic Acid Itching     Pollen Extract      Seasonal     Amoxicillin Rash     Liquid Adhesive Rash     Nabumetone GI Disturbance     GI upset     Past medical history, past surgical history, current medications and allergies reviewed and accurate to  the best of my knowledge.      ROS:  Refer to HPI    /82 (BP Location: Right arm, Patient Position: Sitting, Cuff Size: Adult Large)   Pulse 68   Temp 97.6  F (36.4  C) (Tympanic)   Resp 16   LMP  (LMP Unknown)   SpO2 93%     EXAM:  General Appearance: Well appearing 56 year old female, appropriate appearance for age. No acute distress   Ears: Left TM intact, erythematous, bulging, purulence noted.  Right TM intact, translucent with bony landmarks appreciated, no erythema, no effusion, no bulging, no purulence.  Left auditory canal clear.  Right auditory canal clear.  Normal external ears, non tender.  Eyes: conjunctivae normal without erythema or irritation, corneas clear, no drainage or crusting, no eyelid swelling, pupils equal   Oropharynx: moist mucous membranes, posterior pharynx without erythema, tonsils symmetric, no erythema, no exudates or petechiae, no post nasal drip seen, no trismus, voice clear.    Sinuses:  No sinus tenderness upon palpation of the frontal or maxillary sinuses  Nose:  Bilateral nares: no erythema, no edema, no drainage or congestion   Neck: supple without adenopathy  Respiratory: normal chest wall and respirations.  Normal effort.  Clear to auscultation bilaterally, no wheezing, crackles or rhonchi.  No increased work of breathing.  No cough appreciated.  Cardiac: RRR with no murmurs  MSK: patient in CAME boot, weightbearing without difficulty  Dermatological: no rashes noted of exposed skin  Psychological: normal affect, alert, oriented, and pleasant.     Labs:  None     Xray:  None

## 2022-05-28 ENCOUNTER — HOSPITAL ENCOUNTER (EMERGENCY)
Facility: OTHER | Age: 57
Discharge: HOME | End: 2022-05-28
Attending: PHYSICIAN ASSISTANT | Admitting: PHYSICIAN ASSISTANT
Payer: MEDICARE

## 2022-05-28 VITALS
WEIGHT: 191 LBS | DIASTOLIC BLOOD PRESSURE: 68 MMHG | HEIGHT: 63 IN | HEART RATE: 90 BPM | RESPIRATION RATE: 18 BRPM | BODY MASS INDEX: 33.84 KG/M2 | OXYGEN SATURATION: 88 % | TEMPERATURE: 101.2 F | SYSTOLIC BLOOD PRESSURE: 120 MMHG

## 2022-05-28 DIAGNOSIS — U07.1 INFECTION DUE TO 2019 NOVEL CORONAVIRUS: ICD-10-CM

## 2022-05-28 LAB
ANION GAP SERPL CALCULATED.3IONS-SCNC: 11 MMOL/L (ref 3–14)
BASOPHILS # BLD AUTO: 0.1 10E3/UL (ref 0–0.2)
BASOPHILS NFR BLD AUTO: 3 %
BUN SERPL-MCNC: 9 MG/DL (ref 7–25)
CALCIUM SERPL-MCNC: 9.4 MG/DL (ref 8.6–10.3)
CHLORIDE BLD-SCNC: 98 MMOL/L (ref 98–107)
CO2 SERPL-SCNC: 24 MMOL/L (ref 21–31)
CREAT SERPL-MCNC: 0.81 MG/DL (ref 0.6–1.2)
EOSINOPHIL # BLD AUTO: 0.1 10E3/UL (ref 0–0.7)
EOSINOPHIL NFR BLD AUTO: 1 %
ERYTHROCYTE [DISTWIDTH] IN BLOOD BY AUTOMATED COUNT: 12.7 % (ref 10–15)
FLUAV RNA SPEC QL NAA+PROBE: NEGATIVE
FLUBV RNA RESP QL NAA+PROBE: NEGATIVE
GFR SERPL CREATININE-BSD FRML MDRD: 86 ML/MIN/1.73M2
GLUCOSE BLD-MCNC: 115 MG/DL (ref 70–105)
HCT VFR BLD AUTO: 42.4 % (ref 35–47)
HGB BLD-MCNC: 14.8 G/DL (ref 11.7–15.7)
IMM GRANULOCYTES # BLD: 0 10E3/UL
IMM GRANULOCYTES NFR BLD: 0 %
LYMPHOCYTES # BLD AUTO: 0.5 10E3/UL (ref 0.8–5.3)
LYMPHOCYTES NFR BLD AUTO: 10 %
MCH RBC QN AUTO: 32.3 PG (ref 26.5–33)
MCHC RBC AUTO-ENTMCNC: 34.9 G/DL (ref 31.5–36.5)
MCV RBC AUTO: 93 FL (ref 78–100)
MONOCYTES # BLD AUTO: 1.2 10E3/UL (ref 0–1.3)
MONOCYTES NFR BLD AUTO: 25 %
NEUTROPHILS # BLD AUTO: 2.9 10E3/UL (ref 1.6–8.3)
NEUTROPHILS NFR BLD AUTO: 61 %
NRBC # BLD AUTO: 0 10E3/UL
NRBC BLD AUTO-RTO: 0 /100
PLATELET # BLD AUTO: 292 10E3/UL (ref 150–450)
POTASSIUM BLD-SCNC: 3.7 MMOL/L (ref 3.5–5.1)
RBC # BLD AUTO: 4.58 10E6/UL (ref 3.8–5.2)
RSV RNA SPEC NAA+PROBE: NEGATIVE
SARS-COV-2 RNA RESP QL NAA+PROBE: POSITIVE
SODIUM SERPL-SCNC: 133 MMOL/L (ref 134–144)
WBC # BLD AUTO: 4.8 10E3/UL (ref 4–11)

## 2022-05-28 PROCEDURE — 85025 COMPLETE CBC W/AUTO DIFF WBC: CPT | Performed by: PHYSICIAN ASSISTANT

## 2022-05-28 PROCEDURE — 96374 THER/PROPH/DIAG INJ IV PUSH: CPT | Performed by: PHYSICIAN ASSISTANT

## 2022-05-28 PROCEDURE — 258N000003 HC RX IP 258 OP 636: Performed by: PHYSICIAN ASSISTANT

## 2022-05-28 PROCEDURE — 87637 SARSCOV2&INF A&B&RSV AMP PRB: CPT | Performed by: PHYSICIAN ASSISTANT

## 2022-05-28 PROCEDURE — 99284 EMERGENCY DEPT VISIT MOD MDM: CPT | Mod: 25 | Performed by: PHYSICIAN ASSISTANT

## 2022-05-28 PROCEDURE — 36415 COLL VENOUS BLD VENIPUNCTURE: CPT | Performed by: PHYSICIAN ASSISTANT

## 2022-05-28 PROCEDURE — 80048 BASIC METABOLIC PNL TOTAL CA: CPT | Performed by: PHYSICIAN ASSISTANT

## 2022-05-28 PROCEDURE — 250N000011 HC RX IP 250 OP 636: Performed by: PHYSICIAN ASSISTANT

## 2022-05-28 PROCEDURE — 99283 EMERGENCY DEPT VISIT LOW MDM: CPT | Mod: CS | Performed by: PHYSICIAN ASSISTANT

## 2022-05-28 PROCEDURE — C9803 HOPD COVID-19 SPEC COLLECT: HCPCS | Performed by: PHYSICIAN ASSISTANT

## 2022-05-28 RX ORDER — KETOROLAC TROMETHAMINE 15 MG/ML
15 INJECTION, SOLUTION INTRAMUSCULAR; INTRAVENOUS ONCE
Status: COMPLETED | OUTPATIENT
Start: 2022-05-28 | End: 2022-05-28

## 2022-05-28 RX ADMIN — KETOROLAC TROMETHAMINE 15 MG: 15 INJECTION, SOLUTION INTRAMUSCULAR; INTRAVENOUS at 13:32

## 2022-05-28 RX ADMIN — SODIUM CHLORIDE 1000 ML: 9 INJECTION, SOLUTION INTRAVENOUS at 13:32

## 2022-05-28 ASSESSMENT — ENCOUNTER SYMPTOMS
NAUSEA: 0
CONFUSION: 0
SHORTNESS OF BREATH: 0
FATIGUE: 1
DYSURIA: 0
ABDOMINAL PAIN: 0
FEVER: 1
VOMITING: 0

## 2022-05-28 NOTE — DISCHARGE INSTRUCTIONS
Get plenty of fluids and rest.  As discussed, you are positive for COVID today that would explain a lot of your symptoms.  Follow the get well loop that was given to you.  Alternate Tylenol and ibuprofen, self quarantine for least 5 days and when symptoms have fully resolved.  Follow-up with PCP P as needed, return ED if there are worse or concerning symptoms.

## 2022-05-28 NOTE — ED PROVIDER NOTES
History     Chief Complaint   Patient presents with     Sinusitis     Otalgia     Headache     HPI  Alejandra Villegas is a 56 year old female who presents to the ED for evaluation of HA, otalgia, fevers, malaise. Patient states that she woke up this morning with a frontal headache with bilateral ear pain. She states that she was recently treated with a left ear infection and completed azithromycin course 2 days ago. She denies any fevers, sore throat, or vision changes. She took Tylenol for pain with no relief. She rates her pain 9/10.     Allergies:  Allergies   Allergen Reactions     Adhesive Tape      Bee Pollen Swelling     Seasonal     Folic Acid Itching     Pollen Extract      Seasonal     Amoxicillin Rash     Liquid Adhesive Rash     Nabumetone GI Disturbance     GI upset       Problem List:    Patient Active Problem List    Diagnosis Date Noted     CKD (chronic kidney disease) stage 2, GFR 60-89 ml/min 03/31/2022     Priority: Medium     Osteopenia of right foot 02/03/2021     Priority: Medium     Anxiety state 01/30/2018     Priority: Medium     Other isolated or specific phobias 01/30/2018     Priority: Medium     Degenerative disc disease at L5-S1 level 01/30/2018     Priority: Medium     Dyslipidemia 01/30/2018     Priority: Medium     Overview:   low HDL       Hidradenitis suppurativa 01/30/2018     Priority: Medium     Mild persistent asthma without complication 01/30/2018     Priority: Medium     Overview:   versus chronic obstructive pulmonary disease       Obesity 01/30/2018     Priority: Medium     Tobacco use disorder 01/30/2018     Priority: Medium     Gastroesophageal reflux disease 01/18/2017     Priority: Medium     Pain management contract broken 06/05/2015     Priority: Medium     Overview:   Contract violation - see 12/1/15 letter.       HTN (hypertension) 08/25/2014     Priority: Medium     Urge incontinence 06/04/2014     Priority: Medium     Alopecia areata 02/21/2014     Priority:  Medium     Pyelonephritis due to Escherichia coli 06/08/2013     Priority: Medium     Benign paroxysmal positional vertigo 02/28/2013     Priority: Medium     Ovarian cyst, left 05/09/2012     Priority: Medium     Other acquired deformity of ankle and foot(736.79) 05/09/2012     Priority: Medium     RA (rheumatoid arthritis) (H) 05/09/2012     Priority: Medium     Overview:   Diagnosed [place] 2012       Asthma 05/04/2012     Priority: Medium     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     Priority: Medium     Seasonal allergic rhinitis 09/27/2011     Priority: Medium     Symptomatic menopausal or female climacteric states 09/21/2010     Priority: Medium     Abdominal pain, left lower quadrant 08/06/2010     Priority: Medium     Other diseases of lung, not elsewhere classified 08/01/2007     Priority: Medium     Diabetes mellitus type 2, controlled, without complications (H) 07/01/2007     Priority: Medium        Past Medical History:    Past Medical History:   Diagnosis Date     Allergic rhinitis 09/27/2011     Disorder of kidney and ureter 08/08/2008     Dorsalgia      Excessive and frequent menstruation with regular cycle      Generalized anxiety disorder      Hidradenitis suppurativa      Hyperlipidemia      Obesity 07/01/2007     Other disorders of lung (CODE) 08/01/2007     Other specified disorders of Eustachian tube, unspecified ear 02/27/2012     Other specified phobia      Personal history of other medical treatment (CODE)      Rheumatoid arthritis (H) 02/27/2012     Tobacco use      Type 2 diabetes mellitus without complications (H) 07/01/2007     Uncomplicated asthma        Past Surgical History:    Past Surgical History:   Procedure Laterality Date     ARTHROSCOPY KNEE  05/2017    Dr Torres     ARTHROSCOPY KNEE  07/20/2017    Dr Garza, lateral release, meniscal repair     ARTHROTOMY WRIST Left 2011    Dr. Torres     CHOLECYSTECTOMY  06/2007    Emergency tracheotomy following failed  intubation for laparoscopic cholecystectomy.     DILATION AND CURETTAGE  09/2006          HERNIA REPAIR  2007    Incisoinal hernia repair     HYSTERECTOMY TOTAL ABDOMINAL  02/2010          IMPLANT STIMULATOR AND LEADS SACRAL NERVE (STAGE ONE AND TWO) N/A 12/11/2018    Procedure: Interstim Battery Replacement;  Surgeon: Rl Ambriz MD;  Location: [facility] OR     INTERSTIM DEVICE STAGE 2  01/06/2014    Dr. Ambriz - [facility]     LAPAROSCOPIC ABLATION ENDOMETRIOSIS  09/2006          OOPHORECTOMY Left 2010     Dr Thorpe     RECONSTRUCT FOREFOOT WITH METATARSOPHALANGEAL (MTP) FUSION Right 5/5/2022    Procedure: Right fibular sesmoid excision and 1st metatarsal phalangeal joint fusion;  Surgeon: Rl Nathan DPM;  Location: [facility] OR     RELEASE CARPAL TUNNEL  02/02/2017          SALPINGO OOPHORECTOMY,R/L/KELSEY Right 06/1999    Right salpingo-oophorectomy for hemorrhagic corpus luteum cyst     TRACHEOSTOMY  2007    emergency following failed intubation during cholecystectomy     TYMPANOSTOMY, LOCAL/TOPICAL ANESTHESIA  08/2006    PE tube placement       Family History:    Family History   Problem Relation Age of Onset     Arthritis Mother         Arthritis,Rheumatoid     Cancer Mother         Cancer, lung and uterine cancer     Heart Disease Father         Heart Disease,CAD, CABG, peripheral vascular dise     Thyroid Disease Father         Thyroid Disease, hyperlipidemia     Other - See Comments Father         djd     Asthma Brother         Asthma     Asthma Sister         Asthma     Other - See Comments Sister         Psychiatric illness,Anxiety     Other - See Comments Son         x2 back surgeries     Breast Cancer No family hx of         Cancer-breast       Social History:  Marital Status:   [4]  Social History     Tobacco Use     Smoking status: Current Every Day Smoker     Packs/day: 1.00     Years: 32.00     Pack years: 32.00     Types: Cigarettes     Smokeless tobacco: Never Used   Vaping Use     Vaping Use: Never  "used   Substance Use Topics     Alcohol use: No     Alcohol/week: 0.0 standard drinks     Drug use: No        Medications:    atorvastatin (LIPITOR) 40 MG tablet  oxyCODONE (ROXICODONE) 5 MG tablet  PARoxetine (PAXIL) 20 MG tablet  albuterol (PROAIR HFA/PROVENTIL HFA/VENTOLIN HFA) 108 (90 Base) MCG/ACT inhaler  albuterol (PROVENTIL) (2.5 MG/3ML) 0.083% neb solution  aspirin 81 MG EC tablet  gabapentin (NEURONTIN) 400 MG capsule  ibuprofen (ADVIL/MOTRIN) 800 MG tablet  lisinopril (ZESTRIL) 10 MG tablet  metFORMIN (GLUCOPHAGE) 500 MG tablet  nicotine (NICODERM CQ) 14 MG/24HR 24 hr patch  nicotine (NICODERM CQ) 7 MG/24HR 24 hr patch  order for DME  SUMAtriptan (IMITREX) 50 MG tablet          Review of Systems   Constitutional: Positive for fatigue and fever.   HENT: Negative for congestion.    Eyes: Negative for visual disturbance.   Respiratory: Negative for shortness of breath.    Cardiovascular: Negative for chest pain.   Gastrointestinal: Negative for abdominal pain, nausea and vomiting.   Genitourinary: Negative for dysuria.   Psychiatric/Behavioral: Negative for confusion.       Physical Exam   BP: (!) 152/89  Pulse: 94  Temp: (!) 101.1  F (38.4  C)  Resp: 20  Height: 160 cm (5' 3\")  Weight: 86.6 kg (191 lb)  SpO2: 95 %      Physical Exam  Constitutional:       General: She is not in acute distress.     Appearance: She is well-developed. She is not diaphoretic.   HENT:      Head: Normocephalic and atraumatic.      Right Ear: Tympanic membrane normal.      Ears:      Comments: Slight erythema to left TM, no bulging  Eyes:      General: No scleral icterus.     Conjunctiva/sclera: Conjunctivae normal.   Cardiovascular:      Rate and Rhythm: Normal rate and regular rhythm.   Pulmonary:      Effort: Pulmonary effort is normal.      Breath sounds: Normal breath sounds.   Abdominal:      Palpations: Abdomen is soft.      Tenderness: There is no abdominal tenderness.   Musculoskeletal:         General: No deformity.      " Cervical back: Neck supple.   Lymphadenopathy:      Cervical: No cervical adenopathy.   Skin:     General: Skin is warm and dry.      Coloration: Skin is not jaundiced.      Findings: No rash.   Neurological:      Mental Status: She is alert and oriented to person, place, and time. Mental status is at baseline.   Psychiatric:         Mood and Affect: Mood normal.         Behavior: Behavior normal.         Thought Content: Thought content normal.         Judgment: Judgment normal.         ED Course                 Procedures              Critical Care time:  none               Results for orders placed or performed during the hospital encounter of 05/28/22 (from the past 24 hour(s))   Symptomatic; Unknown Influenza A/B & SARS-CoV2 (COVID-19) Virus PCR Multiplex Nose    Specimen: Nose; Swab   Result Value Ref Range    Influenza A PCR Negative Negative    Influenza B PCR Negative Negative    RSV PCR Negative Negative    SARS CoV2 PCR Positive (A) Negative    Narrative    Testing was performed using the Xpert Xpress CoV2/Flu/RSV Assay on the [employer] GeneXpert Instrument. This test should be ordered for the detection of SARS-CoV-2 and influenza viruses in individuals who meet clinical and/or epidemiological criteria. Test performance is unknown in asymptomatic patients. This test is for in vitro diagnostic use under the FDA EUA for laboratories certified under CLIA to perform high or moderate complexity testing. This test has not been FDA cleared or approved. A negative result does not rule out the presence of PCR inhibitors in the specimen or target RNA in concentration below the limit of detection for the assay. If only one viral target is positive but coinfection with multiple targets is suspected, the sample should be re-tested with another FDA cleared, approved, or authorized test, if coinfection would change clinical management. This test was validated by the [facility] code-laboration. These laboratories are  certified under the Clinical  Laboratory Improvement Amendments of 1988 (CLIA-88) as qualified to perform high complexity laboratory testing.   CBC with platelets differential    Narrative    The following orders were created for panel order CBC with platelets differential.  Procedure                               Abnormality         Status                     ---------                               -----------         ------                     CBC with platelets and d...[[id number]]  Abnormal            Final result                 Please view results for these tests on the individual orders.   Basic metabolic panel   Result Value Ref Range    Sodium 133 (L) 134 - 144 mmol/L    Potassium 3.7 3.5 - 5.1 mmol/L    Chloride 98 98 - 107 mmol/L    Carbon Dioxide (CO2) 24 21 - 31 mmol/L    Anion Gap 11 3 - 14 mmol/L    Urea Nitrogen 9 7 - 25 mg/dL    Creatinine 0.81 0.60 - 1.20 mg/dL    Calcium 9.4 8.6 - 10.3 mg/dL    Glucose 115 (H) 70 - 105 mg/dL    GFR Estimate 86 >60 mL/min/1.73m2   CBC with platelets and differential   Result Value Ref Range    WBC Count 4.8 4.0 - 11.0 10e3/uL    RBC Count 4.58 3.80 - 5.20 10e6/uL    Hemoglobin 14.8 11.7 - 15.7 g/dL    Hematocrit 42.4 35.0 - 47.0 %    MCV 93 78 - 100 fL    MCH 32.3 26.5 - 33.0 pg    MCHC 34.9 31.5 - 36.5 g/dL    RDW 12.7 10.0 - 15.0 %    Platelet Count 292 150 - 450 10e3/uL    % Neutrophils 61 %    % Lymphocytes 10 %    % Monocytes 25 %    % Eosinophils 1 %    % Basophils 3 %    % Immature Granulocytes 0 %    NRBCs per 100 WBC 0 <1 /100    Absolute Neutrophils 2.9 1.6 - 8.3 10e3/uL    Absolute Lymphocytes 0.5 (L) 0.8 - 5.3 10e3/uL    Absolute Monocytes 1.2 0.0 - 1.3 10e3/uL    Absolute Eosinophils 0.1 0.0 - 0.7 10e3/uL    Absolute Basophils 0.1 0.0 - 0.2 10e3/uL    Absolute Immature Granulocytes 0.0 <=0.4 10e3/uL    Absolute NRBCs 0.0 10e3/uL       Medications   0.9% sodium chloride BOLUS (0 mLs Intravenous Stopped 5/28/22 8927)   ketorolac (TORADOL) injection 15 mg  (15 mg Intravenous Given 5/28/22 1332)       Assessments & Plan (with Medical Decision Making)   Nontoxic but ill appearing. Heart, lung, bowel sounds normal, abd soft, nontender to palpation, nondistended. VSS, febrile.     She has no leukocytosis, normal hemoglobin.  She does have lymphocytopenia, mild hyponatremia.  She is positive for COVID.    Overall, she appears to be doing well.  She remains above 95% on room air and showing no signs of respiratory distress.  Her symptoms seem very consistent with COVID illness and currently she is showing no concerning physical exam or vital instability.     We  will send her home on a get well loop.  She will continue with conservative treatments and quarantine for at least 5 days or until full symptom resolution.    She just got done taking her azithromycin, which should still be in her system for a few more days.  Therefore, I will not treat her for further otitis media at this time.  This can be reevaluated after it seems like she is getting over the COVID illness.    Strict return precautions are given to the pt, they will return if symptoms are worsening or concerning. The pt understands and agrees with the plan and they are discharged.     Abhijeet Sandoval PA-C    I have reviewed the nursing notes.    I have reviewed the findings, diagnosis, plan and need for follow up with the patient.       Discharge Medication List as of 5/28/2022  2:56 PM          Final diagnoses:   Infection due to 2019 novel coronavirus       5/28/2022   [facility] AND [facility]     Abhijeet Sandoval PA  05/28/22 9820

## 2022-05-28 NOTE — ED NOTES
COVID positive, MD updated, continues to report worst headache ever, febrile, in dark room with sunglasses and ice pack per request to head.    Marleny Nichols RN on 5/28/2022 at 1:49 PM

## 2022-05-28 NOTE — LETTER
May 28, 2022      To Whom It May Concern:      Alejandra Villegas was seen in our Emergency Department today, 05/28/22.  I expect her condition to improve over the next 5 days.  She may return to work when improved.    Sincerely,        GIOVANNI Rubi

## 2022-06-04 DIAGNOSIS — J45.30 MILD PERSISTENT ASTHMA WITHOUT COMPLICATION: ICD-10-CM

## 2022-06-05 RX ORDER — ALBUTEROL SULFATE 90 UG/1
2 AEROSOL, METERED RESPIRATORY (INHALATION) EVERY 4 HOURS PRN
Qty: 8.5 G | Refills: 1 | Status: SHIPPED | OUTPATIENT
Start: 2022-06-05 | End: 2022-08-12

## 2022-06-06 DIAGNOSIS — Z98.1 HX OF SURGICAL FUSION JOINT: Primary | ICD-10-CM

## 2022-06-23 DIAGNOSIS — Z98.1 HX OF SURGICAL FUSION JOINT: Primary | ICD-10-CM

## 2022-06-30 ENCOUNTER — OFFICE VISIT (OUTPATIENT)
Dept: ORTHOPEDICS | Facility: OTHER | Age: 57
End: 2022-06-30
Attending: PODIATRIST
Payer: MEDICARE

## 2022-06-30 ENCOUNTER — HOSPITAL ENCOUNTER (OUTPATIENT)
Dept: GENERAL RADIOLOGY | Facility: OTHER | Age: 57
Discharge: HOME | End: 2022-06-30
Attending: PODIATRIST
Payer: MEDICARE

## 2022-06-30 VITALS — WEIGHT: 194 LBS | BODY MASS INDEX: 35.7 KG/M2 | HEIGHT: 62 IN

## 2022-06-30 DIAGNOSIS — Z98.1 HX OF SURGICAL FUSION JOINT: ICD-10-CM

## 2022-06-30 DIAGNOSIS — Z09 POSTOPERATIVE FOLLOW-UP: Primary | ICD-10-CM

## 2022-06-30 PROCEDURE — 73630 X-RAY EXAM OF FOOT: CPT | Mod: RT

## 2022-06-30 PROCEDURE — 99024 POSTOP FOLLOW-UP VISIT: CPT | Performed by: PODIATRIST

## 2022-06-30 ASSESSMENT — PAIN SCALES - GENERAL: PAINLEVEL: EXTREME PAIN (8)

## 2022-06-30 NOTE — PROGRESS NOTES
Visit Date: 2022    HISTORY OF PRESENT ILLNESS:  Maggy is here for followup of right foot.  She is 7 weeks out from fibular sesamoid excision, first MPJ fusion, doing well, no acute concern.  Does get some burning and some pain around the plate but generally doing.    PHYSICAL EXAMINATION:    MUSCULOSKELETAL:  Surgical site well healed.  Excellent alignment of first MPJ with good stability and no concerns here clinically.  Mild symptoms over the hardware to palpate.    IMAGING:  Three views of foot demonstrate a healing first MPJ fusion, sesamoid excision is noted.    PLAN:  I discussed condition and treatment with the patient today.  She will progress.  Shoes and activity as tolerated.  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  Otherwise a good supportive tennis shoe, she can progress into as able.  I will see her back in 4 weeks, repeat x-rays, release if doing well at that time.    Rl Nathan DPM        D: 2022   T: 2022   MT: [id number]    Name:     MAGGY ARNETT  MRN:      [id number]        Account:    [id number]   :      1965           Visit Date: 2022     Document: [id number]

## 2022-07-27 DIAGNOSIS — Z09 POSTOPERATIVE FOLLOW-UP: Primary | ICD-10-CM

## 2022-08-11 DIAGNOSIS — J45.30 MILD PERSISTENT ASTHMA WITHOUT COMPLICATION: ICD-10-CM

## 2022-08-11 NOTE — TELEPHONE ENCOUNTER
TW #[id number] sent Rx request for the following:      ALBUTEROL HFA 90MCG/ACT INH      Last Prescription Date:   6/5/2022  Last Fill Qty/Refills:         8.5g, R-1    Last Office Visit:              5/2/2022   Future Office visit:           none    Leonard Ambriz RN, BSN  ....................  8/11/2022   12:06 PM

## 2022-08-12 RX ORDER — ALBUTEROL SULFATE 90 UG/1
2 AEROSOL, METERED RESPIRATORY (INHALATION) EVERY 4 HOURS PRN
Qty: 8.5 G | Refills: 1 | Status: SHIPPED | OUTPATIENT
Start: 2022-08-12 | End: 2022-09-19

## 2022-08-18 ENCOUNTER — OFFICE VISIT (OUTPATIENT)
Dept: FAMILY MEDICINE | Facility: OTHER | Age: 57
End: 2022-08-18
Attending: NURSE PRACTITIONER
Payer: MEDICARE

## 2022-08-18 ENCOUNTER — HOSPITAL ENCOUNTER (OUTPATIENT)
Dept: GENERAL RADIOLOGY | Facility: OTHER | Age: 57
Discharge: HOME | End: 2022-08-18
Attending: FAMILY MEDICINE
Payer: MEDICARE

## 2022-08-18 VITALS
DIASTOLIC BLOOD PRESSURE: 94 MMHG | HEART RATE: 70 BPM | OXYGEN SATURATION: 95 % | RESPIRATION RATE: 18 BRPM | SYSTOLIC BLOOD PRESSURE: 144 MMHG | TEMPERATURE: 96.9 F | HEIGHT: 66 IN | BODY MASS INDEX: 28.78 KG/M2 | WEIGHT: 179.1 LBS

## 2022-08-18 DIAGNOSIS — M79.674 PAIN OF TOE OF RIGHT FOOT: Primary | ICD-10-CM

## 2022-08-18 PROCEDURE — 99213 OFFICE O/P EST LOW 20 MIN: CPT | Performed by: FAMILY MEDICINE

## 2022-08-18 PROCEDURE — G0463 HOSPITAL OUTPT CLINIC VISIT: HCPCS

## 2022-08-18 PROCEDURE — 73660 X-RAY EXAM OF TOE(S): CPT | Mod: RT

## 2022-08-18 ASSESSMENT — PAIN SCALES - GENERAL: PAINLEVEL: EXTREME PAIN (9)

## 2022-08-18 NOTE — LETTER
August 18, 2022        Alejandra Villegas        She was seen today for a medical condition.          Favio Mock MD on 8/18/2022 at 11:26 AM
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
## 2022-08-25 ENCOUNTER — OFFICE VISIT (OUTPATIENT)
Dept: ORTHOPEDICS | Facility: OTHER | Age: 57
End: 2022-08-25
Attending: PODIATRIST
Payer: MEDICARE

## 2022-08-25 VITALS — BODY MASS INDEX: 30.56 KG/M2 | WEIGHT: 179 LBS | HEIGHT: 64 IN

## 2022-08-25 DIAGNOSIS — T84.84XA PAINFUL ORTHOPAEDIC HARDWARE (H): Primary | ICD-10-CM

## 2022-08-25 PROCEDURE — G0463 HOSPITAL OUTPT CLINIC VISIT: HCPCS

## 2022-08-25 PROCEDURE — 99213 OFFICE O/P EST LOW 20 MIN: CPT | Performed by: PODIATRIST

## 2022-08-25 ASSESSMENT — PAIN SCALES - GENERAL: PAINLEVEL: SEVERE PAIN (6)

## 2022-08-25 NOTE — PROGRESS NOTES
Visit Date: 08/25/2022    HISTORY:  Maggy is here 15 weeks out from MPJ fusion, doing well.  She still has some soreness on the top of her foot and plantar aspect of the sesamoids, likely related to the hardware per exam today.  I would like to discuss removal of this.    HISTORY REVIEW:  I have reviewed this patient's past medical history, family history, social history as well as medications and allergies.  Any changes/additions were appropriately charted in the patient's electronic medical record.      PHYSICAL EXAMINATION:   CONSTITUTIONAL:  The patient is alert and oriented x 3, well appearing and in no apparent distress.  Affect is pleasant and answers questions appropriately.  VASCULAR:  Circulation is intact with palpable pedal pulses and adequate capillary fill time to all digits.  Hair growth is present and appropriate to mid foot and digits.  NEUROLOGIC:  Light touch sensation is intact to digits.  There is a negative Tinel sign.  There are no signs of apparent nerve entrapment of superficial peroneal or common peroneal nerves.  INTEGUMENT:  No abnormal dermatologic lesions are noted.  Skin has normal texture and turgor.    MUSCULOSKELETAL:  Tender to palpate over the hardware.   She has stable fusion, both clinically and radiographically.    IMAGING:  I did review toe x-rays taken relatively recently.  They do show a well-healed first MPJ.  Intact, well-positioned hardware.    ASSESSMENT:  Painful hardware, previous metatarsophalangeal joint fusion.    PLAN:  I discussed condition and treatment with the patient today.  I did discuss hardware removal.  We will schedule this at her convenience.  I discussed surgery, recovery, risks, potential complications, alternatives and benefits.  Schedule at her convenience.  Followup accordingly postop.    Twenty-minute consultation.    Rl Nathan DPM        D: 08/25/2022   T: 08/25/2022   [facility]    Name:     MAGGY ARNETT  MRN:      [id number]         Account:    [id number]   :      1965           Visit Date: 2022     Document: [id number]

## 2022-08-31 ENCOUNTER — HOSPITAL ENCOUNTER (OUTPATIENT)
Facility: OTHER | Age: 57
End: 2022-08-31
Attending: PODIATRIST | Admitting: PODIATRIST
Payer: MEDICARE

## 2022-09-18 DIAGNOSIS — J45.30 MILD PERSISTENT ASTHMA WITHOUT COMPLICATION: ICD-10-CM

## 2022-09-19 NOTE — TELEPHONE ENCOUNTER
Routing refill request to provider for review/approval because:    LOV: 5/2/22    Kathleen Bishop RN on 9/19/2022 at 4:18 PM

## 2022-09-20 RX ORDER — ALBUTEROL SULFATE 90 UG/1
2 AEROSOL, METERED RESPIRATORY (INHALATION) EVERY 4 HOURS PRN
Qty: 25.5 G | Refills: 1 | Status: SHIPPED | OUTPATIENT
Start: 2022-09-20 | End: 2022-12-22

## 2022-10-03 ENCOUNTER — APPOINTMENT (OUTPATIENT)
Dept: GENERAL RADIOLOGY | Facility: OTHER | Age: 57
End: 2022-10-03
Attending: STUDENT IN AN ORGANIZED HEALTH CARE EDUCATION/TRAINING PROGRAM
Payer: MEDICARE

## 2022-10-03 ENCOUNTER — HOSPITAL ENCOUNTER (EMERGENCY)
Facility: OTHER | Age: 57
Discharge: HOME | End: 2022-10-03
Attending: STUDENT IN AN ORGANIZED HEALTH CARE EDUCATION/TRAINING PROGRAM
Payer: MEDICARE

## 2022-10-03 VITALS
RESPIRATION RATE: 16 BRPM | DIASTOLIC BLOOD PRESSURE: 89 MMHG | TEMPERATURE: 97 F | OXYGEN SATURATION: 97 % | HEART RATE: 68 BPM | WEIGHT: 191 LBS | SYSTOLIC BLOOD PRESSURE: 154 MMHG | BODY MASS INDEX: 32.79 KG/M2

## 2022-10-03 DIAGNOSIS — M25.572 ACUTE LEFT ANKLE PAIN: ICD-10-CM

## 2022-10-03 PROCEDURE — 73610 X-RAY EXAM OF ANKLE: CPT | Mod: LT

## 2022-10-03 PROCEDURE — 96374 THER/PROPH/DIAG INJ IV PUSH: CPT | Performed by: STUDENT IN AN ORGANIZED HEALTH CARE EDUCATION/TRAINING PROGRAM

## 2022-10-03 PROCEDURE — 99285 EMERGENCY DEPT VISIT HI MDM: CPT | Mod: 25 | Performed by: STUDENT IN AN ORGANIZED HEALTH CARE EDUCATION/TRAINING PROGRAM

## 2022-10-03 PROCEDURE — 99283 EMERGENCY DEPT VISIT LOW MDM: CPT | Performed by: STUDENT IN AN ORGANIZED HEALTH CARE EDUCATION/TRAINING PROGRAM

## 2022-10-03 PROCEDURE — 96375 TX/PRO/DX INJ NEW DRUG ADDON: CPT | Performed by: STUDENT IN AN ORGANIZED HEALTH CARE EDUCATION/TRAINING PROGRAM

## 2022-10-03 PROCEDURE — 250N000011 HC RX IP 250 OP 636: Performed by: STUDENT IN AN ORGANIZED HEALTH CARE EDUCATION/TRAINING PROGRAM

## 2022-10-03 RX ORDER — HYDROMORPHONE HYDROCHLORIDE 1 MG/ML
0.5 INJECTION, SOLUTION INTRAMUSCULAR; INTRAVENOUS; SUBCUTANEOUS ONCE
Status: COMPLETED | OUTPATIENT
Start: 2022-10-03 | End: 2022-10-03

## 2022-10-03 RX ORDER — KETOROLAC TROMETHAMINE 15 MG/ML
15 INJECTION, SOLUTION INTRAMUSCULAR; INTRAVENOUS ONCE
Status: COMPLETED | OUTPATIENT
Start: 2022-10-03 | End: 2022-10-03

## 2022-10-03 RX ADMIN — HYDROMORPHONE HYDROCHLORIDE 0.5 MG: 1 INJECTION, SOLUTION INTRAMUSCULAR; INTRAVENOUS; SUBCUTANEOUS at 21:13

## 2022-10-03 RX ADMIN — KETOROLAC TROMETHAMINE 15 MG: 15 INJECTION, SOLUTION INTRAMUSCULAR; INTRAVENOUS at 21:13

## 2022-10-03 ASSESSMENT — ACTIVITIES OF DAILY LIVING (ADL)
ADLS_ACUITY_SCORE: 35
ADLS_ACUITY_SCORE: 35

## 2022-10-03 NOTE — Clinical Note
Alejandra Villegas was seen and treated in our emergency department on 10/3/2022.  She may return to work on 10/10/2022.  May return sooner if able to.     If you have any questions or concerns, please don't hesitate to call.      Rad Gold MD

## 2022-10-04 ENCOUNTER — TELEPHONE (OUTPATIENT)
Dept: FAMILY MEDICINE | Facility: OTHER | Age: 57
End: 2022-10-04

## 2022-10-04 NOTE — DISCHARGE INSTRUCTIONS
Per emergency department interpretation of your x-ray there does not appear to be an obvious fracture.  We will let you know if radiology sees any concerning findings with their interpretation.  Take NSAID (e.g. ibuprofen) taken with food every 6 hours as needed for pain and add tylenol 1000 mg every 6 hours as needed for extra pain control. Ice regularly over the next 3 days for 20 minutes at a time at least 3-4x's per day. Use provided ankle brace for comfort and crutches as needed. Perform range of motion exercises daily by writing the ABC's with the big toe 3-4x's per day until healed. You can do activities as tolerated and avoid activity that causes pain. If not improving significantly after 2 weeks please follow up with primary care provider. Return to ER if pain significantly worsens.
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
## 2022-10-04 NOTE — ED PROVIDER NOTES
History     Chief Complaint   Patient presents with     Ankle Pain       Alejandra Villegas is a 56 year old female who presents with left ankle pain. Pain started this evening after accidentally falling off a deck which failed landing approximately 3 feet below on her left foot.  She is unsure of exact mechanism of the landing but reports severe lateral malleolar pain and inability to bear weight.  She is able to feel her foot and wiggle her toes however there are paresthesias.  She received fentanyl 100 mcg by EMS and pain is currently rated 6/10.  She denies any current medication for further pain reduction at this time.  Denies any other trauma including head injury, knee pain, hip pain.    Allergies   Allergen Reactions     Adhesive Tape      Bee Pollen Swelling     Seasonal     Folic Acid Itching     Pollen Extract      Seasonal     Amoxicillin Rash     Liquid Adhesive Rash     Nabumetone GI Disturbance     GI upset       Patient Active Problem List    Diagnosis Date Noted     CKD (chronic kidney disease) stage 2, GFR 60-89 ml/min 03/31/2022     Priority: Medium     Osteopenia of right foot 02/03/2021     Priority: Medium     Anxiety state 01/30/2018     Priority: Medium     Other isolated or specific phobias 01/30/2018     Priority: Medium     Degenerative disc disease at L5-S1 level 01/30/2018     Priority: Medium     Dyslipidemia 01/30/2018     Priority: Medium     Overview:   low HDL       Hidradenitis suppurativa 01/30/2018     Priority: Medium     Mild persistent asthma without complication 01/30/2018     Priority: Medium     Overview:   versus chronic obstructive pulmonary disease       Obesity 01/30/2018     Priority: Medium     Tobacco use disorder 01/30/2018     Priority: Medium     Gastroesophageal reflux disease 01/18/2017     Priority: Medium     Pain management contract broken 06/05/2015     Priority: Medium     Overview:   Contract violation - see 12/1/15 letter.       HTN (hypertension)  08/25/2014     Priority: Medium     Urge incontinence 06/04/2014     Priority: Medium     Alopecia areata 02/21/2014     Priority: Medium     Pyelonephritis due to Escherichia coli 06/08/2013     Priority: Medium     Benign paroxysmal positional vertigo 02/28/2013     Priority: Medium     Ovarian cyst, left 05/09/2012     Priority: Medium     Other acquired deformity of ankle and foot(736.79) 05/09/2012     Priority: Medium     RA (rheumatoid arthritis) (H) 05/09/2012     Priority: Medium     Overview:   Diagnosed [place] 2012       Asthma 05/04/2012     Priority: Medium     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     Priority: Medium     Seasonal allergic rhinitis 09/27/2011     Priority: Medium     Symptomatic menopausal or female climacteric states 09/21/2010     Priority: Medium     Abdominal pain, left lower quadrant 08/06/2010     Priority: Medium     Other diseases of lung, not elsewhere classified 08/01/2007     Priority: Medium     Diabetes mellitus type 2, controlled, without complications (H) 07/01/2007     Priority: Medium       Past Medical History:   Diagnosis Date     Allergic rhinitis 09/27/2011     Disorder of kidney and ureter 08/08/2008     Dorsalgia      Excessive and frequent menstruation with regular cycle      Generalized anxiety disorder      Hidradenitis suppurativa      Hyperlipidemia      Obesity 07/01/2007     Other disorders of lung (CODE) 08/01/2007     Other specified disorders of Eustachian tube, unspecified ear 02/27/2012     Other specified phobia      Personal history of other medical treatment (CODE)      Rheumatoid arthritis (H) 02/27/2012     Tobacco use      Type 2 diabetes mellitus without complications (H) 07/01/2007     Uncomplicated asthma        Past Surgical History:   Procedure Laterality Date     ARTHROSCOPY KNEE  05/2017    Dr Torres     ARTHROSCOPY KNEE  07/20/2017    Dr Garza, lateral release, meniscal repair     ARTHROTOMY WRIST Left 2011      [facility]     CHOLECYSTECTOMY  06/2007    Emergency tracheotomy following failed intubation for laparoscopic cholecystectomy.     DILATION AND CURETTAGE  09/2006          HERNIA REPAIR  2007    Incisoinal hernia repair     HYSTERECTOMY TOTAL ABDOMINAL  02/2010          IMPLANT STIMULATOR AND LEADS SACRAL NERVE (STAGE ONE AND TWO) N/A 12/11/2018    Procedure: Interstim Battery Replacement;  Surgeon: Rl Ambriz MD;  Location: [facility] OR     INTERSTIM DEVICE STAGE 2  01/06/2014    Dr. Ambriz - [facility]     LAPAROSCOPIC ABLATION ENDOMETRIOSIS  09/2006          OOPHORECTOMY Left 2010     Dr Thorpe     RECONSTRUCT FOREFOOT WITH METATARSOPHALANGEAL (MTP) FUSION Right 5/5/2022    Procedure: Right fibular sesmoid excision and 1st metatarsal phalangeal joint fusion;  Surgeon: Rl Nathan DPM;  Location: [facility] OR     RELEASE CARPAL TUNNEL  02/02/2017          SALPINGO OOPHORECTOMY,R/L/KELSEY Right 06/1999    Right salpingo-oophorectomy for hemorrhagic corpus luteum cyst     TRACHEOSTOMY  2007    emergency following failed intubation during cholecystectomy     TYMPANOSTOMY, LOCAL/TOPICAL ANESTHESIA  08/2006    PE tube placement       Family History   Problem Relation Age of Onset     Arthritis Mother         Arthritis,Rheumatoid     Cancer Mother         Cancer, lung and uterine cancer     Heart Disease Father         Heart Disease,CAD, CABG, peripheral vascular dise     Thyroid Disease Father         Thyroid Disease, hyperlipidemia     Other - See Comments Father         djd     Asthma Brother         Asthma     Asthma Sister         Asthma     Other - See Comments Sister         Psychiatric illness,Anxiety     Other - See Comments Son         x2 back surgeries     Breast Cancer No family hx of         Cancer-breast       Social History     Tobacco Use     Smoking status: Current Every Day Smoker     Packs/day: 1.00     Years: 32.00     Pack years: 32.00     Types: Cigarettes     Smokeless tobacco: Never Used   Vaping Use     Vaping  Use: Never used   Substance Use Topics     Alcohol use: No     Alcohol/week: 0.0 standard drinks     Drug use: No       Medications:    albuterol (PROAIR HFA/PROVENTIL HFA/VENTOLIN HFA) 108 (90 Base) MCG/ACT inhaler  albuterol (PROVENTIL) (2.5 MG/3ML) 0.083% neb solution  aspirin 81 MG EC tablet  atorvastatin (LIPITOR) 40 MG tablet  gabapentin (NEURONTIN) 400 MG capsule  ibuprofen (ADVIL/MOTRIN) 800 MG tablet  lisinopril (ZESTRIL) 10 MG tablet  metFORMIN (GLUCOPHAGE) 500 MG tablet  nicotine (NICODERM CQ) 14 MG/24HR 24 hr patch  nicotine (NICODERM CQ) 7 MG/24HR 24 hr patch  order for DME  oxyCODONE (ROXICODONE) 5 MG tablet  PARoxetine (PAXIL) 20 MG tablet  SUMAtriptan (IMITREX) 50 MG tablet      Review of Systems: See HPI for pertinent negatives and positives. All other systems reviewed and found to be negative.    Physical Exam   BP (!) 181/90   Pulse 68   Temp 97  F (36.1  C)   Resp 16   Wt 86.6 kg (191 lb)   LMP  (LMP Unknown)   SpO2 97%   BMI 32.79 kg/m       General: awake, uncomfortable  HEENT: atraumatic  Respiratory: normal effort  Cardiovascular: left DP and TP pulses 2+  Extremities: left ankle tender and with swelling over lateral and anterior aspects. ROM and strength limited by pain. Wiggles all left toes. Adjacent knee nontender with normal ROM, no hip pain flexion and internal/external rotation.  Skin: warm, dry, skin intact, no discoloration   Neuro: alert, sensation intact throughout left foot  Psych: appropriate mood and affect    ED Course      ED Course as of 10/03/22 2251   Mon Oct 03, 2022   2237 Feeling much better s/p dilaudid. Looks comfortable. Suspect sprain per personal XR interpretation. Awaiting official radiology read.   2245 Patient would like to leave now. Discussed we will contact her if radiology identifies any concerning findings.       Results for orders placed or performed during the hospital encounter of 10/03/22 (from the past 24 hour(s))   XR Ankle Port Left G/E 3 Views     Impression    No appreciated acute ankle bony abnormality.       Medications   ketorolac (TORADOL) injection 15 mg (15 mg Intravenous Given 10/3/22 2113)   HYDROmorphone (PF) (DILAUDID) injection 0.5 mg (0.5 mg Intravenous Given 10/3/22 2113)       Assessments & Plan (with Medical Decision Making)     I have reviewed nursing notes.    56 year old female evaluated for left ankle pain after fall from several feet onto her left foot. Exam with swelling and tenderness over anterolateral left ankle. CMS intact. Adjacent joints unremarkable on exam. X-ray without fracture per personal interpretation. Official radiology read pending and patient eager to leave now, therefore will follow up if there are any concerning findings identified by radiology. Pain treated per above with good response.  Diagnosis most consistent with soft tissue injury. Recommend ice, elevation, and alternating tylenol and ibuprofen taken with food for pain management. Ankle brace and crutches provided. Patient given instructions on diagnosis including follow-up and warning signs for which to return to ED. The patient was discharged in stable condition.    I have reviewed the findings, diagnosis, plan and need for any follow up with the patient.    Patient instructions:   Per emergency department interpretation of your x-ray there does not appear to be an obvious fracture.  We will let you know if radiology sees any concerning findings with their interpretation.  Take NSAID (e.g. ibuprofen) taken with food every 6 hours as needed for pain and add tylenol 1000 mg every 6 hours as needed for extra pain control. Ice regularly over the next 3 days for 20 minutes at a time at least 3-4x's per day. Use provided ankle brace for comfort and crutches as needed. Perform range of motion exercises daily by writing the ABC's with the big toe 3-4x's per day until healed. You can do activities as tolerated and avoid activity that causes pain. If not improving  significantly after 2 weeks please follow up with primary care provider. Return to ER if pain significantly worsens.      Current Discharge Medication List          Final diagnoses:   Acute left ankle pain       10/3/2022   [facility]       Rad Gold MD  10/03/22 4821

## 2022-10-04 NOTE — TELEPHONE ENCOUNTER
Patient was   In the Ed last night and is wondering if the radiologist read her xray?    Please call back thank you   Elizabeth Webber on 10/4/2022 at 10:11 AM

## 2022-10-05 ENCOUNTER — OFFICE VISIT (OUTPATIENT)
Dept: INTERNAL MEDICINE | Facility: OTHER | Age: 57
End: 2022-10-05
Attending: NURSE PRACTITIONER
Payer: MEDICARE

## 2022-10-05 VITALS
WEIGHT: 191 LBS | SYSTOLIC BLOOD PRESSURE: 128 MMHG | OXYGEN SATURATION: 94 % | BODY MASS INDEX: 32.79 KG/M2 | HEART RATE: 77 BPM | TEMPERATURE: 97.8 F | DIASTOLIC BLOOD PRESSURE: 86 MMHG | RESPIRATION RATE: 16 BRPM

## 2022-10-05 DIAGNOSIS — M25.572 PAIN IN JOINT, ANKLE AND FOOT, LEFT: ICD-10-CM

## 2022-10-05 DIAGNOSIS — S99.912D INJURY OF LEFT ANKLE, SUBSEQUENT ENCOUNTER: Primary | ICD-10-CM

## 2022-10-05 PROCEDURE — G0463 HOSPITAL OUTPT CLINIC VISIT: HCPCS

## 2022-10-05 PROCEDURE — 99215 OFFICE O/P EST HI 40 MIN: CPT | Performed by: NURSE PRACTITIONER

## 2022-10-05 RX ORDER — DOXYCYCLINE 100 MG/1
CAPSULE ORAL
COMMUNITY
Start: 2022-01-10 | End: 2024-01-23

## 2022-10-05 RX ORDER — HYDROCODONE BITARTRATE AND ACETAMINOPHEN 5; 325 MG/1; MG/1
1 TABLET ORAL EVERY 6 HOURS PRN
Qty: 10 TABLET | Refills: 0 | Status: SHIPPED | OUTPATIENT
Start: 2022-10-05 | End: 2022-10-08

## 2022-10-05 ASSESSMENT — PAIN SCALES - GENERAL: PAINLEVEL: WORST PAIN (10)

## 2022-10-05 NOTE — NURSING NOTE
"Chief Complaint   Patient presents with     Musculoskeletal Problem     Injured L ankle/foot   Patient presents for a L ankle/foot injury from falling through a deck and landing on L foot/ankle on 10/3/22.  Patient went to ER by ambulance, that was actually at home to  roommate to go to ER for other reasons.    Initial /86 (BP Location: Left arm, Patient Position: Sitting, Cuff Size: Adult Regular)   Pulse 77   Temp 97.8  F (36.6  C) (Temporal)   Resp 16   Wt 86.6 kg (191 lb)   LMP  (LMP Unknown)   SpO2 94%   Breastfeeding No   BMI 32.79 kg/m   Estimated body mass index is 32.79 kg/m  as calculated from the following:    Height as of 8/25/22: 1.626 m (5' 4\").    Weight as of this encounter: 86.6 kg (191 lb).     Medication Reconciliation: complete      FOOD SECURITY SCREENING QUESTIONS:    The next two questions are to help us understand your food security.  If you are feeling you need any assistance in this area, we have resources available to support you today.    Hunger Vital Signs:  Within the past 12 months we worried whether our food would run out before we got money to buy more. Never  Within the past 12 months the food we bought just didn't last and we didn't have money to get more. Never        Advance care plan reviewed      Tamra Lucero LPN on 10/5/2022 at 11:22 AM      "

## 2022-10-05 NOTE — TELEPHONE ENCOUNTER
Patient had an ER follow up visit with Lore Gastelum NP.      Natasha Damon LPN 10/5/2022 11:48 AM

## 2022-10-05 NOTE — LETTER
October 5, 2022      Alejandra Villegas  [address]  [place] [facility] [place] [facility] [address]        To Whom It May Concern:    Alejandra Villegas was seen in our clinic. She may return to work on or about 10/13/22 after specialty evaluation.    Sincerely,        Lore Gastelum NP

## 2022-10-05 NOTE — PROGRESS NOTES
"  Assessment & Plan     Pain in joint, ankle and foot, left  Injury of left ankle, subsequent encounter  Patient is to follow-up with Dr. Nathan - she has an upcoming appointment already.  Will order CT of soft tissue for further evaluation as her symptoms have not improved.  We put her in a walking boot today hopefully this helps with some of her pain.  Short course of hydrocodone acetaminophen given for pain.  Encouraged RICE.  - Orthopedic  Referral  - Ankle/Foot Bracing Supplies Order for DME - ONLY FOR DME  - HYDROcodone-acetaminophen (NORCO) 5-325 MG tablet  Dispense: 10 tablet; Refill: 0  - CT Ankle Left w/o Contrast      Ordering of each unique test  Prescription drug management  40 minutes spent on the date of the encounter doing chart review, history and exam, documentation and further activities per the note     BMI:   Estimated body mass index is 32.79 kg/m  as calculated from the following:    Height as of 8/25/22: 1.626 m (5' 4\").    Weight as of this encounter: 86.6 kg (191 lb).     Return for Keep Scheduled Appointment Already Made with Jac.    Lore Gastelum NP  [facility]   Alejandra is a 56 year old, presenting for the following health issues: L ankle/foot injury - fell through a deck 10/3/22 and went to ER; had xrays    History of Present Illness       Reason for visit:  I fell done and heart myakle  Symptom onset:  1-2 weeks ago  Symptom intensity:  Severe  Symptom progression:  Worsening  Had these symptoms before:  No  What makes it worse:  Everything I do  What makes it better:  No    She eats 2-3 servings of fruits and vegetables daily.She consumes 9 sweetened beverage(s) daily.She exercises with enough effort to increase her heart rate 20 to 29 minutes per day.  She exercises with enough effort to increase her heart rate 5 days per week.   She is taking medications regularly.     Acute Illness  Acute illness concerns:   L ankle/foot " injury  Onset/Duration:  10/3/22  Symptoms:  Pain 10/10; swelling    Therapies tried and outcome: Elevating, ice, ibuprofen, Tylenol    Review of Systems   Constitutional: Positive for activity change.   Musculoskeletal: Positive for arthralgias, gait problem, joint swelling and myalgias.        Left ankle following injury   Psychiatric/Behavioral: Positive for sleep disturbance (Due to pain).   All other systems reviewed and are negative.           Objective    /86 (BP Location: Left arm, Patient Position: Sitting, Cuff Size: Adult Regular)   Pulse 77   Temp 97.8  F (36.6  C) (Temporal)   Resp 16   Wt 86.6 kg (191 lb)   LMP  (LMP Unknown)   SpO2 94%   Breastfeeding No   BMI 32.79 kg/m    Body mass index is 32.79 kg/m .  Physical Exam  Vitals and nursing note reviewed.   Constitutional:       Appearance: Normal appearance.   HENT:      Head: Normocephalic and atraumatic.   Cardiovascular:      Rate and Rhythm: Normal rate and regular rhythm.      Heart sounds: Normal heart sounds.   Pulmonary:      Effort: Pulmonary effort is normal.      Breath sounds: Normal breath sounds.   Musculoskeletal:         General: Swelling, tenderness, deformity and signs of injury present.      Comments: Significant swelling on both sides of ankle, top of foot, decreased range of motion due to increased pain even with manual moving of her toes   Skin:     Findings: Bruising (Base of toes and on ankle) present.   Neurological:      Mental Status: She is alert and oriented to person, place, and time. Mental status is at baseline.   Psychiatric:         Mood and Affect: Mood normal.         Behavior: Behavior normal.         Thought Content: Thought content normal.         Judgment: Judgment normal.        No results found for any visits on 10/05/22.

## 2022-10-06 ASSESSMENT — ENCOUNTER SYMPTOMS
ARTHRALGIAS: 1
MYALGIAS: 1
JOINT SWELLING: 1
ACTIVITY CHANGE: 1
SLEEP DISTURBANCE: 1

## 2022-12-21 DIAGNOSIS — J45.30 MILD PERSISTENT ASTHMA WITHOUT COMPLICATION: ICD-10-CM

## 2022-12-22 NOTE — TELEPHONE ENCOUNTER
"  Last Prescription Date: 9/20/22  Last Qty/Refills: 25.5 g / 1  Last Office Visit: 10/5/22 Nirav  Future Office Visit: none     Requested Prescriptions   Pending Prescriptions Disp Refills     albuterol (PROAIR HFA/PROVENTIL HFA/VENTOLIN HFA) 108 (90 Base) MCG/ACT inhaler [Pharmacy Med Name: ALBUTEROL HFA 90MCG/ACT INH] 25.5 g 1     Sig: INHALE 2 PUFFS INTO THE LUNGS EVERY 4 HOURS AS NEEDED       Asthma Maintenance Inhalers - Anticholinergics Failed - 12/21/2022  1:28 AM        Failed - Asthma control assessment score within normal limits in last 6 months     Please review ACT score.           Passed - Patient is age 12 years or older        Passed - Medication is active on med list        Passed - Recent (6 mo) or future (30 days) visit within the authorizing provider's specialty     Patient had office visit in the last 6 months or has a visit in the next 30 days with authorizing provider or within the authorizing provider's specialty.  See \"Patient Info\" tab in inbasket, or \"Choose Columns\" in Meds & Orders section of the refill encounter.           Short-Acting Beta Agonist Inhalers Protocol  Failed - 12/21/2022  1:28 AM        Failed - Asthma control assessment score within normal limits in last 6 months     Please review ACT score.           Passed - Patient is age 12 or older        Passed - Medication is active on med list        Passed - Recent (6 mo) or future (30 days) visit within the authorizing provider's specialty     Patient had office visit in the last 6 months or has a visit in the next 30 days with authorizing provider or within the authorizing provider's specialty.  See \"Patient Info\" tab in inbasket, or \"Choose Columns\" in Meds & Orders section of the refill encounter.                   "

## 2022-12-23 RX ORDER — ALBUTEROL SULFATE 90 UG/1
2 AEROSOL, METERED RESPIRATORY (INHALATION) EVERY 4 HOURS PRN
Qty: 25.5 G | Refills: 5 | Status: SHIPPED | OUTPATIENT
Start: 2022-12-23 | End: 2024-01-23

## 2023-02-22 DIAGNOSIS — M25.572 LEFT ANKLE PAIN, UNSPECIFIED CHRONICITY: Primary | ICD-10-CM

## 2023-05-09 ENCOUNTER — OFFICE VISIT (OUTPATIENT)
Dept: FAMILY MEDICINE | Facility: OTHER | Age: 58
End: 2023-05-09
Attending: NURSE PRACTITIONER
Payer: MEDICARE

## 2023-05-09 VITALS
WEIGHT: 176.1 LBS | RESPIRATION RATE: 16 BRPM | DIASTOLIC BLOOD PRESSURE: 86 MMHG | BODY MASS INDEX: 29.34 KG/M2 | SYSTOLIC BLOOD PRESSURE: 154 MMHG | TEMPERATURE: 97.4 F | HEART RATE: 78 BPM | OXYGEN SATURATION: 95 % | HEIGHT: 65 IN

## 2023-05-09 DIAGNOSIS — L02.411 ABSCESS OF RIGHT AXILLA: Primary | ICD-10-CM

## 2023-05-09 PROCEDURE — 250N000009 HC RX 250

## 2023-05-09 PROCEDURE — G0463 HOSPITAL OUTPT CLINIC VISIT: HCPCS | Mod: 25

## 2023-05-09 PROCEDURE — 10060 I&D ABSCESS SIMPLE/SINGLE: CPT

## 2023-05-09 PROCEDURE — 250N000011 HC RX IP 250 OP 636

## 2023-05-09 PROCEDURE — 96372 THER/PROPH/DIAG INJ SC/IM: CPT

## 2023-05-09 RX ORDER — KETOROLAC TROMETHAMINE 30 MG/ML
30 INJECTION, SOLUTION INTRAMUSCULAR; INTRAVENOUS ONCE
Status: COMPLETED | OUTPATIENT
Start: 2023-05-09 | End: 2023-05-09

## 2023-05-09 RX ORDER — LIDOCAINE HYDROCHLORIDE AND EPINEPHRINE 10; 10 MG/ML; UG/ML
2 INJECTION, SOLUTION INFILTRATION; PERINEURAL ONCE
Status: COMPLETED | OUTPATIENT
Start: 2023-05-09 | End: 2023-05-09

## 2023-05-09 RX ORDER — SULFAMETHOXAZOLE/TRIMETHOPRIM 800-160 MG
1 TABLET ORAL 2 TIMES DAILY
Qty: 14 TABLET | Refills: 0 | Status: SHIPPED | OUTPATIENT
Start: 2023-05-09 | End: 2023-05-16

## 2023-05-09 RX ADMIN — KETOROLAC TROMETHAMINE 30 MG: 30 INJECTION, SOLUTION INTRAMUSCULAR; INTRAVENOUS at 10:39

## 2023-05-09 RX ADMIN — LIDOCAINE HYDROCHLORIDE,EPINEPHRINE BITARTRATE 2 ML: 10; .01 INJECTION, SOLUTION INFILTRATION; PERINEURAL at 10:38

## 2023-05-09 ASSESSMENT — ASTHMA QUESTIONNAIRES
QUESTION_4 LAST FOUR WEEKS HOW OFTEN HAVE YOU USED YOUR RESCUE INHALER OR NEBULIZER MEDICATION (SUCH AS ALBUTEROL): ONCE A WEEK OR LESS
ACT_TOTALSCORE: 21
QUESTION_1 LAST FOUR WEEKS HOW MUCH OF THE TIME DID YOUR ASTHMA KEEP YOU FROM GETTING AS MUCH DONE AT WORK, SCHOOL OR AT HOME: A LITTLE OF THE TIME
QUESTION_2 LAST FOUR WEEKS HOW OFTEN HAVE YOU HAD SHORTNESS OF BREATH: ONCE OR TWICE A WEEK
ACT_TOTALSCORE: 21
QUESTION_3 LAST FOUR WEEKS HOW OFTEN DID YOUR ASTHMA SYMPTOMS (WHEEZING, COUGHING, SHORTNESS OF BREATH, CHEST TIGHTNESS OR PAIN) WAKE YOU UP AT NIGHT OR EARLIER THAN USUAL IN THE MORNING: NOT AT ALL
QUESTION_5 LAST FOUR WEEKS HOW WOULD YOU RATE YOUR ASTHMA CONTROL: WELL CONTROLLED

## 2023-05-09 ASSESSMENT — PAIN SCALES - GENERAL: PAINLEVEL: EXTREME PAIN (8)

## 2023-05-09 NOTE — PROGRESS NOTES
ASSESSMENT/PLAN:    (L02.411) Abscess of right axilla  (primary encounter diagnosis)  Comment: Patient presents with a weeklong history of abscess to the right axilla.  She notes that she has been doing warm compresses and triple antibiotic and it has been worsening.  She notes that it is tender, there is erythema and warmth around the site.  Right axilla with 3 cm fluctuant mass with surrounding erythema and warmth.  In incision and drainage was performed as per the procedure note below and the large amount of purulent discharge was expressed.  Patient reports good pain relief after the incision and drainage however would like something for pain.  One-time dose of Toradol was given.  Plan: sulfamethoxazole-trimethoprim (BACTRIM DS)         800-160 MG tablet, ketorolac (TORADOL)         injection 30 mg, Incision and drainage,         lidocaine 1% with EPINEPHrine 1:100,000         injection 2 mL  Keep incision site clean and dry.  You may change dressing as needed.  Take antibiotics as directed.  Take twice daily with food.  You may take up daily probiotic while on this medication.  If abscess returns I recommend follow-up.     Discussed warning signs/symptoms indicative of need to f/u    Follow up if symptoms persist or worsen or concerns    I have reviewed the nursing notes.  I have reviewed the findings, diagnosis, plan and need for follow up with the patient.    I explained my diagnostic considerations and recommendations to the patient, who voiced understanding and agreement with the treatment plan. All questions were answered. We discussed potential side effects of any prescribed or recommended therapies, as well as expectations for response to treatments.    AMARILIS DE LA FUENTE CNP  5/9/2023  10:04 AM    HPI:    Alejandra Villegas is a 57 year old female  who presents to [facility] today for concerns of boil to underarm.     Patient has had an abscess under her right axilla for the past week.  She has  been doing warm compresses and triple antibiotic.  She notes that her underarm is very tender and sore and also has some warmth and erythema around the site.    PCP: AUBREY Shah    Allergy: Amoxicillin.    Past Medical History:   Diagnosis Date     Allergic rhinitis 09/27/2011          Disorder of kidney and ureter 08/08/2008    Kidney disease GFR 87     Dorsalgia     No Comments Provided     Excessive and frequent menstruation with regular cycle     Menorrhagia     Generalized anxiety disorder     No Comments Provided     Hidradenitis suppurativa     No Comments Provided     Hyperlipidemia     No Comments Provided     Obesity 07/01/2007    Obesity  Body-mass index over 30     Other disorders of lung (CODE) 08/01/2007    Pulmonary nodules, stable on follow-up chest CT 12/08.  No further imaging recommended.     Other specified disorders of Eustachian tube, unspecified ear 02/27/2012          Other specified phobia     No Comments Provided     Personal history of other medical treatment (CODE)     Childbirth x4     Rheumatoid arthritis (H) 02/27/2012          Tobacco use     No Comments Provided     Type 2 diabetes mellitus without complications (H) 07/01/2007          Uncomplicated asthma     No Comments Provided     Past Surgical History:   Procedure Laterality Date     ARTHROSCOPY KNEE  05/2017    Dr Torres     ARTHROSCOPY KNEE  07/20/2017    Dr Garza, lateral release, meniscal repair     ARTHROTOMY WRIST Left 2011    Dr. Torres     CHOLECYSTECTOMY  06/2007    Emergency tracheotomy following failed intubation for laparoscopic cholecystectomy.     DILATION AND CURETTAGE  09/2006          HERNIA REPAIR  2007    Incisoinal hernia repair     HYSTERECTOMY TOTAL ABDOMINAL  02/2010          IMPLANT STIMULATOR AND LEADS SACRAL NERVE (STAGE ONE AND TWO) N/A 12/11/2018    Procedure: Interstim Battery Replacement;  Surgeon: Rl Ambriz MD;  Location:  OR     INTERSTIM DEVICE STAGE 2  01/06/2014    Dr. Pb Sparrow [facility]      LAPAROSCOPIC ABLATION ENDOMETRIOSIS  09/2006          OOPHORECTOMY Left 2010     Dr Thorpe     RECONSTRUCT FOREFOOT WITH METATARSOPHALANGEAL (MTP) FUSION Right 5/5/2022    Procedure: Right fibular sesmoid excision and 1st metatarsal phalangeal joint fusion;  Surgeon: Rl Nathan DPM;  Location: [facility] OR     RELEASE CARPAL TUNNEL  02/02/2017          SALPINGO OOPHORECTOMY,R/L/KELSEY Right 06/1999    Right salpingo-oophorectomy for hemorrhagic corpus luteum cyst     TRACHEOSTOMY  2007    emergency following failed intubation during cholecystectomy     TYMPANOSTOMY, LOCAL/TOPICAL ANESTHESIA  08/2006    PE tube placement     Social History     Tobacco Use     Smoking status: Every Day     Packs/day: 1.00     Years: 32.00     Pack years: 32.00     Types: Cigarettes     Smokeless tobacco: Never   Vaping Use     Vaping status: Never Used   Substance Use Topics     Alcohol use: No     Alcohol/week: 0.0 standard drinks of alcohol     Current Outpatient Medications   Medication Sig Dispense Refill     albuterol (PROAIR HFA/PROVENTIL HFA/VENTOLIN HFA) 108 (90 Base) MCG/ACT inhaler INHALE 2 PUFFS INTO THE LUNGS EVERY 4 HOURS AS NEEDED 25.5 g 5     aspirin 81 MG EC tablet Take 1 tablet (81 mg) by mouth 2 times daily 60 tablet 0     atorvastatin (LIPITOR) 40 MG tablet Take 1 tablet (40 mg) by mouth At Bedtime 90 tablet 4     gabapentin (NEURONTIN) 400 MG capsule Take 2 capsules (800 mg) by mouth 2 times daily 360 capsule 4     ibuprofen (ADVIL/MOTRIN) 800 MG tablet TAKE 1 TABLET BY MOUTH THREE TIMES DAILY AS NEEDED 90 tablet 0     lisinopril (ZESTRIL) 10 MG tablet Take 1 tablet (10 mg) by mouth daily 90 tablet 4     metFORMIN (GLUCOPHAGE) 500 MG tablet TAKE 1 TABLET(500 MG) BY MOUTH EVERY DAY WITH BREAKFAST 90 tablet 4     order for DME Equipment being ordered: home neb set up with mask and tubing 1 Device 0     PARoxetine (PAXIL) 20 MG tablet TAKE 1 TABLET(20 MG) BY MOUTH EVERY MORNING 90 tablet 4      "sulfamethoxazole-trimethoprim (BACTRIM DS) 800-160 MG tablet Take 1 tablet by mouth 2 times daily for 7 days 14 tablet 0     SUMAtriptan (IMITREX) 50 MG tablet Take 1 tablet (50 mg) by mouth at onset of headache for migraine May repeat in 2 hours. Max 4 tablets/24 hours. 9 tablet 1     albuterol (PROVENTIL) (2.5 MG/3ML) 0.083% neb solution Take 1 vial (2.5 mg) by nebulization every 6 hours as needed for shortness of breath / dyspnea or wheezing (Patient not taking: Reported on 5/9/2023) 60 mL 11     doxycycline hyclate (VIBRAMYCIN) 100 MG capsule  (Patient not taking: Reported on 5/9/2023)       nicotine (NICODERM CQ) 14 MG/24HR 24 hr patch Place 1 patch onto the skin every 24 hours (Patient not taking: Reported on 5/9/2023) 30 patch 1     nicotine (NICODERM CQ) 7 MG/24HR 24 hr patch Place 1 patch onto the skin every 24 hours (Patient not taking: Reported on 5/9/2023) 30 patch 1     oxyCODONE (ROXICODONE) 5 MG tablet Take 1-2 tablets (5-10 mg) by mouth every 6 hours as needed for moderate to severe pain (Patient not taking: Reported on 5/9/2023) 16 tablet 0     Allergies   Allergen Reactions     Adhesive Tape      Bee Pollen Swelling     Seasonal     Folic Acid Itching     Pollen Extract      Seasonal     Amoxicillin Rash     Liquid Adhesive Rash     Nabumetone GI Disturbance     GI upset     Seasonal Allergies Other (See Comments)     Pollen     Past medical history, past surgical history, current medications and allergies reviewed and accurate to the best of my knowledge.      ROS:  Refer to HPI    BP (!) 154/86 (BP Location: Left arm, Patient Position: Sitting, Cuff Size: Adult Regular)   Pulse 78   Temp 97.4  F (36.3  C) (Tympanic)   Resp 16   Ht 1.651 m (5' 5\")   Wt 79.9 kg (176 lb 1.6 oz)   LMP  (LMP Unknown)   SpO2 95%   BMI 29.30 kg/m      EXAM:  General Appearance: Well appearing 57 year old female, appropriate appearance for age. No acute distress   Eyes: conjunctivae normal without erythema or " irritation, corneas clear, no drainage or crusting, no eyelid swelling, pupils equal   Oropharynx: moist mucous membranes,  voice clear.    Sinuses:  No sinus tenderness upon palpation of the frontal or maxillary sinuses  Nose:  Bilateral nares: no erythema, no edema, no drainage or congestion   Neck: supple without adenopathy  Respiratory: normal chest wall and respirations.  Normal effort.  Clear to auscultation bilaterally, no wheezing, crackles or rhonchi.  No increased work of breathing.  No cough appreciated.  Cardiac: RRR with no murmurs  Musculoskeletal:  Equal movement of bilateral upper extremities.  Equal movement of bilateral lower extremities.  Normal gait.    Dermatological: Right axilla with 3 cm fluctuant mass with surrounding erythema and warmth.  Neuro: Alert and oriented to person, place, and time.  Cranial nerves II-XII grossly intact with no focal or lateralizing deficits.  Muscle tone normal.  Gait normal. No tremor.   Psychological: normal affect, alert, oriented, and pleasant.     PROCEDURE: Risks and benefits of procedure: Incision and drainage reviewed with patient and all questions were answered.  Patient verbalizes consent for procedure.  Effected area cleaned with betadine x 3 then wiped away with alcoholol.  1 pecent lidocaine with epineprhine used to infiltrate the area with good anethesia.  Number 11 scapel used to make a stab incion.  Purlent drainage was removed. Appropraite wound care dressing applied.  Pt tolerated preocedure well.

## 2023-05-09 NOTE — NURSING NOTE
"Pt presents to  for boil under her R armpit x1 wk. Pt states she has been warm compresses, and applying triple antibiotic. Pt states her armpit is very tender and sore. Pt has noticed warmth around the infection and bright red skin.    Chief Complaint   Patient presents with     Derm Problem     Boil under right armpit       FOOD SECURITY SCREENING QUESTIONS  Hunger Vital Signs:  Within the past 12 months we worried whether our food would run out before we got money to buy more. Never  Within the past 12 months the food we bought just didn't last and we didn't have money to get more. Never  Consuelo Deapolloon 5/9/2023 10:03 AM      Initial BP (!) 154/86 (BP Location: Left arm, Patient Position: Sitting, Cuff Size: Adult Regular)   Pulse 78   Temp 97.4  F (36.3  C) (Tympanic)   Resp 16   Ht 1.651 m (5' 5\")   Wt 79.9 kg (176 lb 1.6 oz)   LMP  (LMP Unknown)   SpO2 95%   BMI 29.30 kg/m   Estimated body mass index is 29.3 kg/m  as calculated from the following:    Height as of this encounter: 1.651 m (5' 5\").    Weight as of this encounter: 79.9 kg (176 lb 1.6 oz).  Medication Reconciliation: complete    Consuelo Jackson  "

## 2023-05-09 NOTE — LETTER
[facility]  [address]  [place] [address]  Phone: [phone]  Fax: [phone]    May 9, 2023        Alejandra Villegas  [address]  [place] [address]          To whom it may concern:    RE: Alejandra Villegas    Patient was seen and treated today at our clinic and missed work. Please excuse today.     Please contact me for questions or concerns.      Sincerely,        AMARILIS DE LA FUENTE CNP

## 2023-06-04 DIAGNOSIS — E11.9 CONTROLLED TYPE 2 DIABETES MELLITUS WITHOUT COMPLICATION, WITHOUT LONG-TERM CURRENT USE OF INSULIN (H): ICD-10-CM

## 2023-06-04 DIAGNOSIS — F41.1 ANXIETY STATE: ICD-10-CM

## 2023-06-04 DIAGNOSIS — I10 ESSENTIAL HYPERTENSION: ICD-10-CM

## 2023-06-04 DIAGNOSIS — E78.5 DYSLIPIDEMIA: ICD-10-CM

## 2023-06-07 NOTE — TELEPHONE ENCOUNTER
[id number] sent Rx request for the following:      Requested Prescriptions   Pending Prescriptions Disp Refills     lisinopril (ZESTRIL) 10 MG tablet [Pharmacy Med Name: LISINOPRIL 10MG TABLET] 90 tablet 4     Sig: TAKE 1 TABLET (10 MG) BY MOUTH DAILY       ACE Inhibitors (Including Combos) Protocol Failed - 6/7/2023  3:58 PM        Failed - Blood pressure under 140/90 in past 12 months     BP Readings from Last 3 Encounters:   05/09/23 (!) 154/86   10/05/22 128/86   10/03/22 (!) 154/89           Failed - Normal serum creatinine on file in past 12 months     Recent Labs   Lab Test 05/28/22  1248   CR 0.81           Failed - Normal serum potassium on file in past 12 months     Recent Labs   Lab Test 05/28/22  1248   POTASSIUM 3.7           metFORMIN (GLUCOPHAGE) 500 MG tablet [Pharmacy Med Name: METFORMIN 500MG TABLET] 90 tablet 4     Sig: TAKE 1 TABLET(500 MG) BY MOUTH EVERY DAY WITH BREAKFAST       Biguanide Agents Failed - 6/7/2023  3:58 PM        Failed - Patient has documented A1c within the specified period of time.     If HgbA1C is 8 or greater, it needs to be on file within the past 3 months.  If less than 8, must be on file within the past 6 months.     Recent Labs   Lab Test 03/30/22  0917 05/11/18  1422 07/03/17  1532   A1C 6.0   < >  --    GCVF950  --   --  5.1    < > = values in this interval not displayed.           Failed - Patient's CR is NOT>1.4 OR Patient's EGFR is NOT<45 within past 12 mos.     Recent Labs   Lab Test 05/28/22  1248 03/30/22  0917 03/24/20  1614   GFRESTIMATED 86   < > 67   GFRESTBLACK  --   --  82    < > = values in this interval not displayed.     Recent Labs   Lab Test 05/28/22  1248   CR 0.81           PARoxetine (PAXIL) 20 MG tablet [Pharmacy Med Name: PAROXETINE 20MG TABLET] 90 tablet 4     Sig: TAKE 1 TABLET(20 MG) BY MOUTH EVERY MORNING        atorvastatin (LIPITOR) 40 MG tablet [Pharmacy Med Name: ATORVASTATIN 40MG TABLET] 90 tablet 4      Sig: TAKE 1 TABLET (40 MG) BY MOUTH AT BEDTIME       Statins Protocol Failed - 6/7/2023  3:58 PM        Failed - LDL on file in past 12 months     Recent Labs   Lab Test 03/24/20  1614   LDL 89        Attempted reaching Pt, to confirm PCP. Voicemail not set up. Writer will try again later. Consuelo Riley RN .............. 6/7/2023  4:01 PM

## 2023-06-09 RX ORDER — PAROXETINE 20 MG/1
TABLET, FILM COATED ORAL
Qty: 90 TABLET | Refills: 4 | OUTPATIENT
Start: 2023-06-09

## 2023-06-09 RX ORDER — LISINOPRIL 10 MG/1
10 TABLET ORAL DAILY
Qty: 90 TABLET | Refills: 4 | OUTPATIENT
Start: 2023-06-09

## 2023-06-09 RX ORDER — ATORVASTATIN CALCIUM 40 MG/1
40 TABLET, FILM COATED ORAL AT BEDTIME
Qty: 90 TABLET | Refills: 4 | OUTPATIENT
Start: 2023-06-09

## 2023-06-09 NOTE — TELEPHONE ENCOUNTER
3rd failed attempt to reach Pt. Pharmacy informed to re-route to PCP: Norma Shah of [place]. Consuelo Riley RN .............. 6/9/2023  9:26 AM

## 2023-06-19 DIAGNOSIS — F41.1 ANXIETY STATE: ICD-10-CM

## 2023-06-23 RX ORDER — PAROXETINE 20 MG/1
TABLET, FILM COATED ORAL
Qty: 90 TABLET | Refills: 0 | OUTPATIENT
Start: 2023-06-23

## 2023-06-23 NOTE — TELEPHONE ENCOUNTER
[id number] sent Rx request for the following:    Patient enrolled in our Rx Med Sync service to improve adherence. We are requesting a refill authorization in advance to ensure an active prescription is on file.     Requested Prescriptions   Pending Prescriptions Disp Refills     PARoxetine (PAXIL) 20 MG tablet [Pharmacy Med Name: PAROXETINE 20MG TABLET] 90 tablet 4     Sig: TAKE 1 TABLET(20 MG) BY MOUTH EVERY MORNING   Last Prescription Date:   3/30/22  Last Fill Qty/Refills:         90, R-4    Last Office Visit:              10/5/22   Future Office visit:           None    Consuelo Riley RN .............. 6/23/2023  3:55 PM

## 2023-07-26 DIAGNOSIS — T84.84XA PAINFUL ORTHOPAEDIC HARDWARE (H): Primary | ICD-10-CM

## 2023-07-27 ENCOUNTER — HOSPITAL ENCOUNTER (OUTPATIENT)
Dept: GENERAL RADIOLOGY | Facility: OTHER | Age: 58
Discharge: HOME | End: 2023-07-27
Attending: PODIATRIST
Payer: MEDICARE

## 2023-07-27 ENCOUNTER — OFFICE VISIT (OUTPATIENT)
Dept: ORTHOPEDICS | Facility: OTHER | Age: 58
End: 2023-07-27
Attending: PODIATRIST
Payer: MEDICARE

## 2023-07-27 DIAGNOSIS — M25.572 LEFT ANKLE PAIN, UNSPECIFIED CHRONICITY: Primary | ICD-10-CM

## 2023-07-27 DIAGNOSIS — M25.572 LEFT ANKLE PAIN, UNSPECIFIED CHRONICITY: ICD-10-CM

## 2023-07-27 DIAGNOSIS — T84.84XA PAINFUL ORTHOPAEDIC HARDWARE (H): ICD-10-CM

## 2023-07-27 PROCEDURE — G0463 HOSPITAL OUTPT CLINIC VISIT: HCPCS

## 2023-07-27 PROCEDURE — G0463 HOSPITAL OUTPT CLINIC VISIT: HCPCS | Mod: 25

## 2023-07-27 PROCEDURE — 73610 X-RAY EXAM OF ANKLE: CPT | Mod: LT

## 2023-07-27 PROCEDURE — 99214 OFFICE O/P EST MOD 30 MIN: CPT | Performed by: PODIATRIST

## 2023-07-27 PROCEDURE — 73630 X-RAY EXAM OF FOOT: CPT | Mod: RT

## 2023-07-27 NOTE — PROGRESS NOTES
Patient is here for follow up on her right foot.  Ameena Pierce LPN .....................7/27/2023 11:48 AM

## 2023-07-27 NOTE — PROGRESS NOTES
SUBJECTIVE:  Chronic is here for recheck of right foot pain we did a great toe fusion which worked well she notes clicking and pain associate with hardware.  We discussed hardware removal a year ago she apparently broke her left foot at that time was able to do it she is noticing clicking and pain associated with it would like to consider hardware removal now.  She also inquires about a left-sided sounds as though she had a talus fracture from what she describes.  We did end up getting x-rays of this as well.     ROS: Musculoskeletal and general review of systems are negative, per review of previous clinic questionnaire.  Denies SOB and calf pain.    EXAM:   PHYSICAL EXAMINATION:   CONSTITUTIONAL:  The patient is alert and oriented x 3, well appearing and in no apparent distress.  Affect is pleasant and answers questions appropriately.  VASCULAR:  Circulation is intact with palpable pedal pulses and adequate capillary fill time to all digits.  Hair growth is present and appropriate to mid foot and digits. Calf nontender.  NEUROLOGIC:  Light touch sensation is intact to digits.  There is a negative Tinel sign.    INTEGUMENT:  No abnormal dermatologic lesions are noted.  Skin has normal texture and turgor.    MUSCULOSKELETAL: Primarily right foot evaluated today.  She is tender about first MPJ hardware fusion is otherwise stable.  In good position.  Left ankle is some mild symptoms laterally but again this is stable    IMAGING: 3 views of right foot demonstrate intact well-positioned hardware and first MPJ fusion well-healed fusion site.  Left ankle reveals an intact ankle mortise no acute concerns    ASSESSMENT: Apparent recent history of left talus fracture or hindfoot fracture of some sort which has gone on to heal she has some pain associated with this but overall doing well.  Right painful hardware previous great toe fusion    PLAN OF CARE: Discussed condition treatment patient today.  The right foot is quite  sensitive over the hardware we will plan on hardware removal at her discretion.  We discussed this in detail today.  She will schedule this and follow-up accordingly postop.    Rl Nathan DPM

## 2023-08-04 ENCOUNTER — OFFICE VISIT (OUTPATIENT)
Dept: INTERNAL MEDICINE | Facility: OTHER | Age: 58
End: 2023-08-04
Payer: MEDICARE

## 2023-08-04 VITALS
HEIGHT: 65 IN | BODY MASS INDEX: 29.79 KG/M2 | RESPIRATION RATE: 18 BRPM | WEIGHT: 178.8 LBS | HEART RATE: 76 BPM | OXYGEN SATURATION: 90 % | TEMPERATURE: 97.8 F | DIASTOLIC BLOOD PRESSURE: 84 MMHG | SYSTOLIC BLOOD PRESSURE: 152 MMHG

## 2023-08-04 DIAGNOSIS — R53.82 CHRONIC FATIGUE: ICD-10-CM

## 2023-08-04 DIAGNOSIS — E11.9 CONTROLLED TYPE 2 DIABETES MELLITUS WITHOUT COMPLICATION, WITHOUT LONG-TERM CURRENT USE OF INSULIN (H): ICD-10-CM

## 2023-08-04 DIAGNOSIS — I10 ESSENTIAL HYPERTENSION: ICD-10-CM

## 2023-08-04 DIAGNOSIS — Z01.818 PREOP GENERAL PHYSICAL EXAM: Primary | ICD-10-CM

## 2023-08-04 DIAGNOSIS — J45.40 MODERATE PERSISTENT ASTHMA, UNSPECIFIED WHETHER COMPLICATED: ICD-10-CM

## 2023-08-04 DIAGNOSIS — E55.9 VITAMIN D DEFICIENCY: ICD-10-CM

## 2023-08-04 DIAGNOSIS — J45.30 MILD PERSISTENT ASTHMA WITHOUT COMPLICATION: ICD-10-CM

## 2023-08-04 LAB
ALBUMIN SERPL BCG-MCNC: 4.2 G/DL (ref 3.5–5.2)
ALP SERPL-CCNC: 79 U/L (ref 35–104)
ALT SERPL W P-5'-P-CCNC: 17 U/L (ref 0–50)
ANION GAP SERPL CALCULATED.3IONS-SCNC: 11 MMOL/L (ref 7–15)
AST SERPL W P-5'-P-CCNC: 19 U/L (ref 0–45)
BASOPHILS # BLD AUTO: 0.1 10E3/UL (ref 0–0.2)
BASOPHILS NFR BLD AUTO: 2 %
BILIRUB SERPL-MCNC: 0.3 MG/DL
BUN SERPL-MCNC: 23.8 MG/DL (ref 6–20)
CALCIUM SERPL-MCNC: 9.5 MG/DL (ref 8.6–10)
CHLORIDE SERPL-SCNC: 104 MMOL/L (ref 98–107)
CREAT SERPL-MCNC: 0.98 MG/DL (ref 0.51–0.95)
DEPRECATED HCO3 PLAS-SCNC: 26 MMOL/L (ref 22–29)
EOSINOPHIL # BLD AUTO: 0.2 10E3/UL (ref 0–0.7)
EOSINOPHIL NFR BLD AUTO: 2 %
ERYTHROCYTE [DISTWIDTH] IN BLOOD BY AUTOMATED COUNT: 14.3 % (ref 10–15)
GFR SERPL CREATININE-BSD FRML MDRD: 68 ML/MIN/1.73M2
GLUCOSE SERPL-MCNC: 114 MG/DL (ref 70–99)
HBA1C MFR BLD: 5.7 % (ref 4–6.2)
HCT VFR BLD AUTO: 43.5 % (ref 35–47)
HGB BLD-MCNC: 14.8 G/DL (ref 11.7–15.7)
IMM GRANULOCYTES # BLD: 0 10E3/UL
IMM GRANULOCYTES NFR BLD: 0 %
LYMPHOCYTES # BLD AUTO: 2.7 10E3/UL (ref 0.8–5.3)
LYMPHOCYTES NFR BLD AUTO: 32 %
MCH RBC QN AUTO: 32.4 PG (ref 26.5–33)
MCHC RBC AUTO-ENTMCNC: 34 G/DL (ref 31.5–36.5)
MCV RBC AUTO: 95 FL (ref 78–100)
MONOCYTES # BLD AUTO: 1.1 10E3/UL (ref 0–1.3)
MONOCYTES NFR BLD AUTO: 13 %
NEUTROPHILS # BLD AUTO: 4.3 10E3/UL (ref 1.6–8.3)
NEUTROPHILS NFR BLD AUTO: 51 %
NRBC # BLD AUTO: 0 10E3/UL
NRBC BLD AUTO-RTO: 0 /100
PLATELET # BLD AUTO: 327 10E3/UL (ref 150–450)
POTASSIUM SERPL-SCNC: 4 MMOL/L (ref 3.4–5.3)
PROT SERPL-MCNC: 6.8 G/DL (ref 6.4–8.3)
RBC # BLD AUTO: 4.57 10E6/UL (ref 3.8–5.2)
SODIUM SERPL-SCNC: 141 MMOL/L (ref 136–145)
WBC # BLD AUTO: 8.4 10E3/UL (ref 4–11)

## 2023-08-04 PROCEDURE — 82306 VITAMIN D 25 HYDROXY: CPT | Mod: ZL

## 2023-08-04 PROCEDURE — 83036 HEMOGLOBIN GLYCOSYLATED A1C: CPT | Mod: ZL

## 2023-08-04 PROCEDURE — 36415 COLL VENOUS BLD VENIPUNCTURE: CPT | Mod: ZL

## 2023-08-04 PROCEDURE — 99214 OFFICE O/P EST MOD 30 MIN: CPT

## 2023-08-04 PROCEDURE — 80053 COMPREHEN METABOLIC PANEL: CPT | Mod: ZL

## 2023-08-04 PROCEDURE — G0463 HOSPITAL OUTPT CLINIC VISIT: HCPCS

## 2023-08-04 PROCEDURE — 85025 COMPLETE CBC W/AUTO DIFF WBC: CPT | Mod: ZL

## 2023-08-04 RX ORDER — LISINOPRIL 10 MG/1
10 TABLET ORAL DAILY
Qty: 90 TABLET | Refills: 4 | Status: SHIPPED | OUTPATIENT
Start: 2023-08-04 | End: 2024-01-23

## 2023-08-04 RX ORDER — ALBUTEROL SULFATE 0.83 MG/ML
2.5 SOLUTION RESPIRATORY (INHALATION) EVERY 6 HOURS PRN
Qty: 60 ML | Refills: 11 | Status: SHIPPED | OUTPATIENT
Start: 2023-08-04 | End: 2024-01-23

## 2023-08-04 ASSESSMENT — PAIN SCALES - GENERAL: PAINLEVEL: NO PAIN (0)

## 2023-08-04 NOTE — PROGRESS NOTES
[facility] AND [facility]  [address]  [place] [address]  Phone: [phone]  Primary Provider: Norma Shah  Pre-op Performing Provider: ZACKARY BECKFORD      PREOPERATIVE EVALUATION:  Today's date: 8/4/2023    Alejandra Villegas is a 57 year old female who presents for a preoperative evaluation.      Surgical Information:  Surgery/Procedure: Hardware removal of big toe  Surgery Location: [facility]  Surgeon: Dr. Nathan  Surgery Date: 8/10/2023  Time of Surgery: TBD  Where patient plans to recover: At home with family  Fax number for surgical facility: Note does not need to be faxed, will be available electronically in Epic.    Assessment & Plan     The proposed surgical procedure is considered LOW risk.      ICD-10-CM    1. Preop general physical exam  Z01.818 Comprehensive Metabolic Panel     CBC and Differential     CBC and Differential     Comprehensive Metabolic Panel      2. Controlled type 2 diabetes mellitus without complication, without long-term current use of insulin (H)  E11.9 Hemoglobin A1c     metFORMIN (GLUCOPHAGE) 500 MG tablet     Hemoglobin A1c      3. Chronic fatigue  R53.82 Vitamin D Total     Vitamin D Total      4. Vitamin D deficiency  E55.9 Vitamin D Total     Vitamin D Total      5. Mild persistent asthma without complication  J45.30 albuterol (PROVENTIL) (2.5 MG/3ML) 0.083% neb solution      6. Essential hypertension  I10 lisinopril (ZESTRIL) 10 MG tablet      7. Moderate persistent asthma, unspecified whether complicated  J45.40 Nebulizer and Supplies Order for DME - ONLY FOR DME                  - No identified additional risk factors other than previously addressed    Antiplatelet or Anticoagulation Medication Instructions:   - aspirin: Discontinue aspirin 7-10 days prior to procedure to reduce bleeding risk. It should be resumed postoperatively.     Additional Medication Instructions:   - ACE/ARB: Continue without modification (e.g., MAC anesthesia, neurosurgery,  spine surgery, heart failure, or labile hypertension with risk of hypertension).   - Statins: Continue taking on the day of surgery.    - metformin: HOLD day of surgery.    RECOMMENDATION:  APPROVAL GIVEN to proceed with proposed procedure, without further diagnostic evaluation.            Subjective       HPI related to upcoming procedure: Patient is scheduled for right foot hardware removal by Dr. Nathan on 8/10/2023 due to painful hardware and foot.  She denies any new changes in health, has tolerated anesthesia without any difficulty in the past, is able to walk up a flight of stairs without feeling short of breath.          8/4/2023     9:09 AM   Preop Questions   1. Have you ever had a heart attack or stroke? No   2. Have you ever had surgery on your heart or blood vessels, such as a stent placement, a coronary artery bypass, or surgery on an artery in your head, neck, heart, or legs? No   3. Do you have chest pain with activity? No   4. Do you have a history of  heart failure? No   5. Do you currently have a cold, bronchitis or symptoms of other infection? No   6. Do you have a cough, shortness of breath, or wheezing? YES - Chronic- every day smoker- She is able to walk up a flight of stairs without feeling short of breath   7. Do you or anyone in your family have previous history of blood clots? No   8. Do you or does anyone in your family have a serious bleeding problem such as prolonged bleeding following surgeries or cuts? No   9. Have you ever had problems with anemia or been told to take iron pills? No   10. Have you had any abnormal blood loss such as black, tarry or bloody stools, or abnormal vaginal bleeding? No   11. Have you ever had a blood transfusion? No   12. Are you willing to have a blood transfusion if it is medically needed before, during, or after your surgery? Yes   13. Have you or any of your relatives ever had problems with anesthesia? No   14. Do you have sleep apnea, excessive  snoring or daytime drowsiness? No   15. Do you have any artifical heart valves or other implanted medical devices like a pacemaker, defibrillator, or continuous glucose monitor? No   16. Do you have artificial joints? No   17. Are you allergic to latex? No   18. Is there any chance that you may be pregnant? No     Health Care Directive:  Patient does not have a Health Care Directive or Living Will: Discussed advance care planning with patient; however, patient declined at this time.    Preoperative Review of :   reviewed - controlled substances reflected in medication list.      Status of Chronic Conditions:  See problem list for active medical problems.  Problems all longstanding and stable, except as noted/documented.  See ROS for pertinent symptoms related to these conditions.    Tobacco abuse-continues to smoke 1 pack/day with 32-year history of this.    ASTHMA - Patient has a longstanding history of moderate-severe Asthma . Patient has been doing well overall noting SOB and WHEEZING and continues on medication regimen consisting of Uses albuterol as needed without adverse reactions or side effects.     HYPERLIPIDEMIA - Patient has a long history of significant Hyperlipidemia requiring medication for treatment with recent fair control. Patient reports no problems or side effects with the medication.     HYPERTENSION -elevated today in clinic due to patient running out of her lisinopril.  152/84 today.  Prescription was sent to pharmacy with instruction to start taking this medication again.  Patient has longstanding history of HTN , currently denies any symptoms referable to elevated blood pressure. Specifically denies chest pain, palpitations, dyspnea, orthopnea, PND or peripheral edema. Blood pressure readings have been in normal range. Current medication regimen is as listed below. Patient denies any side effects of medication.     DIABETES: Type II-currently takes 500 mg of metformin daily, A1c is well  controlled at 5.7.    Review of Systems  CONSTITUTIONAL: NEGATIVE for fever, chills, change in weight  ENT/MOUTH: NEGATIVE for ear, mouth and throat problems  RESP: NEGATIVE for significant cough or SOB (chronic cough from smoking)  CV: NEGATIVE for chest pain, palpitations or peripheral edema  MUSCULOSKELETAL: Right toe pain r/t hardware    Patient Active Problem List    Diagnosis Date Noted     Pain in joint, ankle and foot, left 10/05/2022     Priority: Medium     CKD (chronic kidney disease) stage 2, GFR 60-89 ml/min 03/31/2022     Priority: Medium     Osteopenia of right foot 02/03/2021     Priority: Medium     Anxiety state 01/30/2018     Priority: Medium     Other isolated or specific phobias 01/30/2018     Priority: Medium     Degenerative disc disease at L5-S1 level 01/30/2018     Priority: Medium     Dyslipidemia 01/30/2018     Priority: Medium     Overview:   low HDL       Hidradenitis suppurativa 01/30/2018     Priority: Medium     Mild persistent asthma without complication 01/30/2018     Priority: Medium     Overview:   versus chronic obstructive pulmonary disease       Obesity 01/30/2018     Priority: Medium     Tobacco use disorder 01/30/2018     Priority: Medium     Gastroesophageal reflux disease 01/18/2017     Priority: Medium     Pain management contract broken 06/05/2015     Priority: Medium     Overview:   Contract violation - see 12/1/15 letter.       HTN (hypertension) 08/25/2014     Priority: Medium     Urge incontinence 06/04/2014     Priority: Medium     Alopecia areata 02/21/2014     Priority: Medium     Pyelonephritis due to Escherichia coli 06/08/2013     Priority: Medium     Benign paroxysmal positional vertigo 02/28/2013     Priority: Medium     Ovarian cyst, left 05/09/2012     Priority: Medium     Other acquired deformity of ankle and foot(736.79) 05/09/2012     Priority: Medium     RA (rheumatoid arthritis) (H) 05/09/2012     Priority: Medium     Overview:   Diagnosed [place]  2012       Asthma 05/04/2012     Priority: Medium     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     Priority: Medium     Seasonal allergic rhinitis 09/27/2011     Priority: Medium     Symptomatic menopausal or female climacteric states 09/21/2010     Priority: Medium     Abdominal pain, left lower quadrant 08/06/2010     Priority: Medium     Other diseases of lung, not elsewhere classified 08/01/2007     Priority: Medium     Diabetes mellitus type 2, controlled, without complications (H) 07/01/2007     Priority: Medium      Past Medical History:   Diagnosis Date     Allergic rhinitis 09/27/2011          Disorder of kidney and ureter 08/08/2008    Kidney disease GFR 87     Dorsalgia     No Comments Provided     Excessive and frequent menstruation with regular cycle     Menorrhagia     Generalized anxiety disorder     No Comments Provided     Hidradenitis suppurativa     No Comments Provided     Hyperlipidemia     No Comments Provided     Obesity 07/01/2007    Obesity  Body-mass index over 30     Other disorders of lung (CODE) 08/01/2007    Pulmonary nodules, stable on follow-up chest CT 12/08.  No further imaging recommended.     Other specified disorders of Eustachian tube, unspecified ear 02/27/2012          Other specified phobia     No Comments Provided     Personal history of other medical treatment (CODE)     Childbirth x4     Rheumatoid arthritis (H) 02/27/2012          Tobacco use     No Comments Provided     Type 2 diabetes mellitus without complications (H) 07/01/2007          Uncomplicated asthma     No Comments Provided     Past Surgical History:   Procedure Laterality Date     ARTHROSCOPY KNEE  05/2017    Dr Torres     ARTHROSCOPY KNEE  07/20/2017    Dr Garza, lateral release, meniscal repair     ARTHROTOMY WRIST Left 2011    Dr. Torres     CHOLECYSTECTOMY  06/2007    Emergency tracheotomy following failed intubation for laparoscopic cholecystectomy.     DILATION AND CURETTAGE  09/2006           HERNIA REPAIR  2007    Incisoinal hernia repair     HYSTERECTOMY TOTAL ABDOMINAL  02/2010          IMPLANT STIMULATOR AND LEADS SACRAL NERVE (STAGE ONE AND TWO) N/A 12/11/2018    Procedure: Interstim Battery Replacement;  Surgeon: Rl Ambriz MD;  Location: [facility] OR     INTERSTIM DEVICE STAGE 2  01/06/2014    Dr. Ambriz - [facility]     LAPAROSCOPIC ABLATION ENDOMETRIOSIS  09/2006          OOPHORECTOMY Left 2010     Dr Thorpe     RECONSTRUCT FOREFOOT WITH METATARSOPHALANGEAL (MTP) FUSION Right 5/5/2022    Procedure: Right fibular sesmoid excision and 1st metatarsal phalangeal joint fusion;  Surgeon: Rl Nathan DPM;  Location: [facility] OR     RELEASE CARPAL TUNNEL  02/02/2017          SALPINGO OOPHORECTOMY,R/L/KELSEY Right 06/1999    Right salpingo-oophorectomy for hemorrhagic corpus luteum cyst     TRACHEOSTOMY  2007    emergency following failed intubation during cholecystectomy     TYMPANOSTOMY, LOCAL/TOPICAL ANESTHESIA  08/2006    PE tube placement     Current Outpatient Medications   Medication Sig Dispense Refill     albuterol (PROAIR HFA/PROVENTIL HFA/VENTOLIN HFA) 108 (90 Base) MCG/ACT inhaler INHALE 2 PUFFS INTO THE LUNGS EVERY 4 HOURS AS NEEDED 25.5 g 5     albuterol (PROVENTIL) (2.5 MG/3ML) 0.083% neb solution Take 1 vial (2.5 mg) by nebulization every 6 hours as needed for shortness of breath or wheezing 60 mL 11     atorvastatin (LIPITOR) 40 MG tablet Take 1 tablet (40 mg) by mouth At Bedtime 90 tablet 4     gabapentin (NEURONTIN) 400 MG capsule Take 2 capsules (800 mg) by mouth 2 times daily 360 capsule 4     ibuprofen (ADVIL/MOTRIN) 800 MG tablet TAKE 1 TABLET BY MOUTH THREE TIMES DAILY AS NEEDED 90 tablet 0     lisinopril (ZESTRIL) 10 MG tablet Take 1 tablet (10 mg) by mouth daily 90 tablet 4     metFORMIN (GLUCOPHAGE) 500 MG tablet TAKE 1 TABLET(500 MG) BY MOUTH EVERY DAY WITH BREAKFAST 90 tablet 4     PARoxetine (PAXIL) 20 MG tablet TAKE 1 TABLET(20 MG) BY MOUTH EVERY MORNING 90 tablet 4      "SUMAtriptan (IMITREX) 50 MG tablet Take 1 tablet (50 mg) by mouth at onset of headache for migraine May repeat in 2 hours. Max 4 tablets/24 hours. 9 tablet 1     aspirin 81 MG EC tablet Take 1 tablet (81 mg) by mouth 2 times daily (Patient not taking: Reported on 8/4/2023) 60 tablet 0     doxycycline hyclate (VIBRAMYCIN) 100 MG capsule  (Patient not taking: Reported on 5/9/2023)       nicotine (NICODERM CQ) 14 MG/24HR 24 hr patch Place 1 patch onto the skin every 24 hours (Patient not taking: Reported on 5/9/2023) 30 patch 1     nicotine (NICODERM CQ) 7 MG/24HR 24 hr patch Place 1 patch onto the skin every 24 hours (Patient not taking: Reported on 5/9/2023) 30 patch 1     order for DME Equipment being ordered: home neb set up with mask and tubing 1 Device 0     oxyCODONE (ROXICODONE) 5 MG tablet Take 1-2 tablets (5-10 mg) by mouth every 6 hours as needed for moderate to severe pain (Patient not taking: Reported on 5/9/2023) 16 tablet 0       Allergies   Allergen Reactions     Adhesive Tape      Bee Pollen Swelling     Seasonal     Bee Venom Unknown     Folic Acid Itching     Meloxicam Unknown     Pollen Extract      Seasonal     Amoxicillin Rash     Liquid Adhesive Rash     Nabumetone GI Disturbance     GI upset     Seasonal Allergies Other (See Comments)     Pollen        Social History     Tobacco Use     Smoking status: Every Day     Packs/day: 1.00     Years: 32.00     Pack years: 32.00     Types: Cigarettes     Smokeless tobacco: Never   Substance Use Topics     Alcohol use: No     Alcohol/week: 0.0 standard drinks of alcohol       History   Drug Use No         Objective     BP (!) 152/84   Pulse 76   Temp 97.8  F (36.6  C) (Tympanic)   Resp 18   Ht 1.66 m (5' 5.35\")   Wt 81.1 kg (178 lb 12.8 oz)   LMP  (LMP Unknown)   SpO2 90%   BMI 29.43 kg/m      Physical Exam  Vitals reviewed.   Constitutional:       General: She is not in acute distress.     Appearance: She is well-developed.   HENT:      Head: " Normocephalic and atraumatic.      Nose: Nose normal.      Mouth/Throat:      Pharynx: No oropharyngeal exudate.   Eyes:      General: No scleral icterus.     Conjunctiva/sclera: Conjunctivae normal.      Pupils: Pupils are equal, round, and reactive to light.   Neck:      Thyroid: No thyromegaly.   Cardiovascular:      Rate and Rhythm: Normal rate and regular rhythm.      Heart sounds: No murmur heard.  Pulmonary:      Effort: Pulmonary effort is normal. No respiratory distress.      Breath sounds: Normal breath sounds. No wheezing.   Musculoskeletal:         General: No tenderness or deformity. Normal range of motion.      Cervical back: Normal range of motion and neck supple.   Skin:     General: Skin is warm and dry.      Findings: No rash.   Neurological:      Mental Status: She is alert and oriented to person, place, and time.      Cranial Nerves: No cranial nerve deficit.      Coordination: Coordination normal.   Psychiatric:         Behavior: Behavior normal.         Thought Content: Thought content normal.         Judgment: Judgment normal.             Diagnostics:  Recent Results (from the past 168 hour(s))   Vitamin D Total    Collection Time: 08/04/23  9:45 AM   Result Value Ref Range    Vitamin D, Total (25-Hydroxy) 26 20 - 75 ug/L   Comprehensive Metabolic Panel    Collection Time: 08/04/23  9:45 AM   Result Value Ref Range    Sodium 141 136 - 145 mmol/L    Potassium 4.0 3.4 - 5.3 mmol/L    Chloride 104 98 - 107 mmol/L    Carbon Dioxide (CO2) 26 22 - 29 mmol/L    Anion Gap 11 7 - 15 mmol/L    Urea Nitrogen 23.8 (H) 6.0 - 20.0 mg/dL    Creatinine 0.98 (H) 0.51 - 0.95 mg/dL    Calcium 9.5 8.6 - 10.0 mg/dL    Glucose 114 (H) 70 - 99 mg/dL    Alkaline Phosphatase 79 35 - 104 U/L    AST 19 0 - 45 U/L    ALT 17 0 - 50 U/L    Protein Total 6.8 6.4 - 8.3 g/dL    Albumin 4.2 3.5 - 5.2 g/dL    Bilirubin Total 0.3 <=1.2 mg/dL    GFR Estimate 68 >60 mL/min/1.73m2   Hemoglobin A1c    Collection Time: 08/04/23  9:45  AM   Result Value Ref Range    Hemoglobin A1C 5.7 4.0 - 6.2 %   CBC with platelets and differential    Collection Time: 08/04/23  9:45 AM   Result Value Ref Range    WBC Count 8.4 4.0 - 11.0 10e3/uL    RBC Count 4.57 3.80 - 5.20 10e6/uL    Hemoglobin 14.8 11.7 - 15.7 g/dL    Hematocrit 43.5 35.0 - 47.0 %    MCV 95 78 - 100 fL    MCH 32.4 26.5 - 33.0 pg    MCHC 34.0 31.5 - 36.5 g/dL    RDW 14.3 10.0 - 15.0 %    Platelet Count 327 150 - 450 10e3/uL    % Neutrophils 51 %    % Lymphocytes 32 %    % Monocytes 13 %    % Eosinophils 2 %    % Basophils 2 %    % Immature Granulocytes 0 %    NRBCs per 100 WBC 0 <1 /100    Absolute Neutrophils 4.3 1.6 - 8.3 10e3/uL    Absolute Lymphocytes 2.7 0.8 - 5.3 10e3/uL    Absolute Monocytes 1.1 0.0 - 1.3 10e3/uL    Absolute Eosinophils 0.2 0.0 - 0.7 10e3/uL    Absolute Basophils 0.1 0.0 - 0.2 10e3/uL    Absolute Immature Granulocytes 0.0 <=0.4 10e3/uL    Absolute NRBCs 0.0 10e3/uL      No EKG required, no history of coronary heart disease, significant arrhythmia, peripheral arterial disease or other structural heart disease.    Revised Cardiac Risk Index (RCRI):  The patient has the following serious cardiovascular risks for perioperative complications:   - No serious cardiac risks = 0 points     RCRI Interpretation: 0 points: Class I (very low risk - 0.4% complication rate)           Signed Electronically by: AMARILIS Hernandez CNP  Copy of this evaluation report is provided to requesting physician.

## 2023-08-04 NOTE — PATIENT INSTRUCTIONS
For informational purposes only. Not to replace the advice of your health care provider. Copyright   2003,  [employer] [facility]. All rights reserved. Clinically reviewed by Damaris Cooper MD. [facility] [employer] [place] [id number] - REV .  Preparing for Your Surgery  Getting started  A nurse will call you to review your health history and instructions. They will give you an arrival time based on your scheduled surgery time. Please be ready to share:  Your doctor's clinic name and phone number  Your medical, surgical, and anesthesia history  A list of allergies and sensitivities  A list of medicines, including herbal treatments and over-the-counter drugs  Whether the patient has a legal guardian (ask how to send us the papers in advance)  Please tell us if you're pregnant--or if there's any chance you might be pregnant. Some surgeries may injure a fetus (unborn baby), so they require a pregnancy test. Surgeries that are safe for a fetus don't always need a test, and you can choose whether to have one.   If you have a child who's having surgery, please ask for a copy of Preparing for Your Child's Surgery.    Preparing for surgery  Within 10 to 30 days of surgery: Have a pre-op exam (sometimes called an H&P, or History and Physical). This can be done at a clinic or pre-operative center.  If you're having a , you may not need this exam. Talk to your care team.  At your pre-op exam, talk to your care team about all medicines you take. If you need to stop any medicines before surgery, ask when to start taking them again.  We do this for your safety. Many medicines can make you bleed too much during surgery. Some change how well surgery (anesthesia) drugs work.  Call your insurance company to let them know you're having surgery. (If you don't have insurance, call [phone].)  Call your clinic if there's any change in your health. This includes signs of a cold or flu (sore throat, runny nose, cough, rash, fever). It also  includes a scrape or scratch near the surgery site.  If you have questions on the day of surgery, call your hospital or surgery center.  Eating and drinking guidelines  For your safety: Unless your surgeon tells you otherwise, follow the guidelines below.  Eat and drink as usual until 8 hours before you arrive for surgery. After that, no food or milk.  Drink clear liquids until 2 hours before you arrive. These are liquids you can see through, like water, Gatorade, and Propel Water. They also include plain black coffee and tea (no cream or milk), candy, and breath mints. You can spit out gum when you arrive.  If you drink alcohol: Stop drinking it the night before surgery.  If your care team tells you to take medicine on the morning of surgery, it's okay to take it with a sip of water.  Preventing infection  Shower or bathe the night before and morning of your surgery. Follow the instructions your clinic gave you. (If no instructions, use regular soap.)  Don't shave or clip hair near your surgery site. We'll remove the hair if needed.  Don't smoke or vape the morning of surgery. You may chew nicotine gum up to 2 hours before surgery. A nicotine patch is okay.  Note: Some surgeries require you to completely quit smoking and nicotine. Check with your surgeon.  Your care team will make every effort to keep you safe from infection. We will:  Clean our hands often with soap and water (or an alcohol-based hand rub).  Clean the skin at your surgery site with a special soap that kills germs.  Give you a special gown to keep you warm. (Cold raises the risk of infection.)  Wear special hair covers, masks, gowns and gloves during surgery.  Give antibiotic medicine, if prescribed. Not all surgeries need antibiotics.  What to bring on the day of surgery  Photo ID and insurance card  Copy of your health care directive, if you have one  Glasses and hearing aids (bring cases)  You can't wear contacts during surgery  Inhaler and eye  drops, if you use them (tell us about these when you arrive)  CPAP machine or breathing device, if you use them  A few personal items, if spending the night  If you have . . .  A pacemaker, ICD (cardiac defibrillator) or other implant: Bring the ID card.  An implanted stimulator: Bring the remote control.  A legal guardian: Bring a copy of the certified (court-stamped) guardianship papers.  Please remove any jewelry, including body piercings. Leave jewelry and other valuables at home.  If you're going home the day of surgery  You must have a responsible adult drive you home. They should stay with you overnight as well.  If you don't have someone to stay with you, and you aren't safe to go home alone, we may keep you overnight. Insurance often won't pay for this.  After surgery  If it's hard to control your pain or you need more pain medicine, please call your surgeon's office.  Questions?   If you have any questions for your care team, list them here: _________________________________________________________________________________________________________________________________________________________________________ ____________________________________ ____________________________________ ____________________________________    How to Take Your Medication Before Surgery  - HOLD (do not take) Aspirin for 7 days

## 2023-08-04 NOTE — H&P (VIEW-ONLY)
[facility] AND [facility]  [address]  [place] [address]  Phone: [phone]  Primary Provider: Norma Shah  Pre-op Performing Provider: ZACKARY BECKFORD      PREOPERATIVE EVALUATION:  Today's date: 8/4/2023    Alejandra Villegas is a 57 year old female who presents for a preoperative evaluation.      Surgical Information:  Surgery/Procedure: Hardware removal of big toe  Surgery Location: [facility]  Surgeon: Dr. Nathan  Surgery Date: 8/10/2023  Time of Surgery: TBD  Where patient plans to recover: At home with family  Fax number for surgical facility: Note does not need to be faxed, will be available electronically in Epic.    Assessment & Plan     The proposed surgical procedure is considered LOW risk.      ICD-10-CM    1. Preop general physical exam  Z01.818 Comprehensive Metabolic Panel     CBC and Differential     CBC and Differential     Comprehensive Metabolic Panel      2. Controlled type 2 diabetes mellitus without complication, without long-term current use of insulin (H)  E11.9 Hemoglobin A1c     metFORMIN (GLUCOPHAGE) 500 MG tablet     Hemoglobin A1c      3. Chronic fatigue  R53.82 Vitamin D Total     Vitamin D Total      4. Vitamin D deficiency  E55.9 Vitamin D Total     Vitamin D Total      5. Mild persistent asthma without complication  J45.30 albuterol (PROVENTIL) (2.5 MG/3ML) 0.083% neb solution      6. Essential hypertension  I10 lisinopril (ZESTRIL) 10 MG tablet      7. Moderate persistent asthma, unspecified whether complicated  J45.40 Nebulizer and Supplies Order for DME - ONLY FOR DME                  - No identified additional risk factors other than previously addressed    Antiplatelet or Anticoagulation Medication Instructions:   - aspirin: Discontinue aspirin 7-10 days prior to procedure to reduce bleeding risk. It should be resumed postoperatively.     Additional Medication Instructions:   - ACE/ARB: Continue without modification (e.g., MAC anesthesia, neurosurgery,  spine surgery, heart failure, or labile hypertension with risk of hypertension).   - Statins: Continue taking on the day of surgery.    - metformin: HOLD day of surgery.    RECOMMENDATION:  APPROVAL GIVEN to proceed with proposed procedure, without further diagnostic evaluation.            Subjective       HPI related to upcoming procedure: Patient is scheduled for right foot hardware removal by Dr. Nathan on 8/10/2023 due to painful hardware and foot.  She denies any new changes in health, has tolerated anesthesia without any difficulty in the past, is able to walk up a flight of stairs without feeling short of breath.          8/4/2023     9:09 AM   Preop Questions   1. Have you ever had a heart attack or stroke? No   2. Have you ever had surgery on your heart or blood vessels, such as a stent placement, a coronary artery bypass, or surgery on an artery in your head, neck, heart, or legs? No   3. Do you have chest pain with activity? No   4. Do you have a history of  heart failure? No   5. Do you currently have a cold, bronchitis or symptoms of other infection? No   6. Do you have a cough, shortness of breath, or wheezing? YES - Chronic- every day smoker- She is able to walk up a flight of stairs without feeling short of breath   7. Do you or anyone in your family have previous history of blood clots? No   8. Do you or does anyone in your family have a serious bleeding problem such as prolonged bleeding following surgeries or cuts? No   9. Have you ever had problems with anemia or been told to take iron pills? No   10. Have you had any abnormal blood loss such as black, tarry or bloody stools, or abnormal vaginal bleeding? No   11. Have you ever had a blood transfusion? No   12. Are you willing to have a blood transfusion if it is medically needed before, during, or after your surgery? Yes   13. Have you or any of your relatives ever had problems with anesthesia? No   14. Do you have sleep apnea, excessive  snoring or daytime drowsiness? No   15. Do you have any artifical heart valves or other implanted medical devices like a pacemaker, defibrillator, or continuous glucose monitor? No   16. Do you have artificial joints? No   17. Are you allergic to latex? No   18. Is there any chance that you may be pregnant? No     Health Care Directive:  Patient does not have a Health Care Directive or Living Will: Discussed advance care planning with patient; however, patient declined at this time.    Preoperative Review of :   reviewed - controlled substances reflected in medication list.      Status of Chronic Conditions:  See problem list for active medical problems.  Problems all longstanding and stable, except as noted/documented.  See ROS for pertinent symptoms related to these conditions.    Tobacco abuse-continues to smoke 1 pack/day with 32-year history of this.    ASTHMA - Patient has a longstanding history of moderate-severe Asthma . Patient has been doing well overall noting SOB and WHEEZING and continues on medication regimen consisting of Uses albuterol as needed without adverse reactions or side effects.     HYPERLIPIDEMIA - Patient has a long history of significant Hyperlipidemia requiring medication for treatment with recent fair control. Patient reports no problems or side effects with the medication.     HYPERTENSION -elevated today in clinic due to patient running out of her lisinopril.  152/84 today.  Prescription was sent to pharmacy with instruction to start taking this medication again.  Patient has longstanding history of HTN , currently denies any symptoms referable to elevated blood pressure. Specifically denies chest pain, palpitations, dyspnea, orthopnea, PND or peripheral edema. Blood pressure readings have been in normal range. Current medication regimen is as listed below. Patient denies any side effects of medication.     DIABETES: Type II-currently takes 500 mg of metformin daily, A1c is well  controlled at 5.7.    Review of Systems  CONSTITUTIONAL: NEGATIVE for fever, chills, change in weight  ENT/MOUTH: NEGATIVE for ear, mouth and throat problems  RESP: NEGATIVE for significant cough or SOB (chronic cough from smoking)  CV: NEGATIVE for chest pain, palpitations or peripheral edema  MUSCULOSKELETAL: Right toe pain r/t hardware    Patient Active Problem List    Diagnosis Date Noted     Pain in joint, ankle and foot, left 10/05/2022     Priority: Medium     CKD (chronic kidney disease) stage 2, GFR 60-89 ml/min 03/31/2022     Priority: Medium     Osteopenia of right foot 02/03/2021     Priority: Medium     Anxiety state 01/30/2018     Priority: Medium     Other isolated or specific phobias 01/30/2018     Priority: Medium     Degenerative disc disease at L5-S1 level 01/30/2018     Priority: Medium     Dyslipidemia 01/30/2018     Priority: Medium     Overview:   low HDL       Hidradenitis suppurativa 01/30/2018     Priority: Medium     Mild persistent asthma without complication 01/30/2018     Priority: Medium     Overview:   versus chronic obstructive pulmonary disease       Obesity 01/30/2018     Priority: Medium     Tobacco use disorder 01/30/2018     Priority: Medium     Gastroesophageal reflux disease 01/18/2017     Priority: Medium     Pain management contract broken 06/05/2015     Priority: Medium     Overview:   Contract violation - see 12/1/15 letter.       HTN (hypertension) 08/25/2014     Priority: Medium     Urge incontinence 06/04/2014     Priority: Medium     Alopecia areata 02/21/2014     Priority: Medium     Pyelonephritis due to Escherichia coli 06/08/2013     Priority: Medium     Benign paroxysmal positional vertigo 02/28/2013     Priority: Medium     Ovarian cyst, left 05/09/2012     Priority: Medium     Other acquired deformity of ankle and foot(736.79) 05/09/2012     Priority: Medium     RA (rheumatoid arthritis) (H) 05/09/2012     Priority: Medium     Overview:   Diagnosed [place]  2012       Asthma 05/04/2012     Priority: Medium     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     Priority: Medium     Seasonal allergic rhinitis 09/27/2011     Priority: Medium     Symptomatic menopausal or female climacteric states 09/21/2010     Priority: Medium     Abdominal pain, left lower quadrant 08/06/2010     Priority: Medium     Other diseases of lung, not elsewhere classified 08/01/2007     Priority: Medium     Diabetes mellitus type 2, controlled, without complications (H) 07/01/2007     Priority: Medium      Past Medical History:   Diagnosis Date     Allergic rhinitis 09/27/2011          Disorder of kidney and ureter 08/08/2008    Kidney disease GFR 87     Dorsalgia     No Comments Provided     Excessive and frequent menstruation with regular cycle     Menorrhagia     Generalized anxiety disorder     No Comments Provided     Hidradenitis suppurativa     No Comments Provided     Hyperlipidemia     No Comments Provided     Obesity 07/01/2007    Obesity  Body-mass index over 30     Other disorders of lung (CODE) 08/01/2007    Pulmonary nodules, stable on follow-up chest CT 12/08.  No further imaging recommended.     Other specified disorders of Eustachian tube, unspecified ear 02/27/2012          Other specified phobia     No Comments Provided     Personal history of other medical treatment (CODE)     Childbirth x4     Rheumatoid arthritis (H) 02/27/2012          Tobacco use     No Comments Provided     Type 2 diabetes mellitus without complications (H) 07/01/2007          Uncomplicated asthma     No Comments Provided     Past Surgical History:   Procedure Laterality Date     ARTHROSCOPY KNEE  05/2017    Dr Torres     ARTHROSCOPY KNEE  07/20/2017    Dr Garza, lateral release, meniscal repair     ARTHROTOMY WRIST Left 2011    Dr. Torres     CHOLECYSTECTOMY  06/2007    Emergency tracheotomy following failed intubation for laparoscopic cholecystectomy.     DILATION AND CURETTAGE  09/2006           HERNIA REPAIR  2007    Incisoinal hernia repair     HYSTERECTOMY TOTAL ABDOMINAL  02/2010          IMPLANT STIMULATOR AND LEADS SACRAL NERVE (STAGE ONE AND TWO) N/A 12/11/2018    Procedure: Interstim Battery Replacement;  Surgeon: Rl Ambriz MD;  Location: [facility] OR     INTERSTIM DEVICE STAGE 2  01/06/2014    Dr. Ambriz - [facility]     LAPAROSCOPIC ABLATION ENDOMETRIOSIS  09/2006          OOPHORECTOMY Left 2010     Dr Thorpe     RECONSTRUCT FOREFOOT WITH METATARSOPHALANGEAL (MTP) FUSION Right 5/5/2022    Procedure: Right fibular sesmoid excision and 1st metatarsal phalangeal joint fusion;  Surgeon: Rl Nathan DPM;  Location: [facility] OR     RELEASE CARPAL TUNNEL  02/02/2017          SALPINGO OOPHORECTOMY,R/L/KELSEY Right 06/1999    Right salpingo-oophorectomy for hemorrhagic corpus luteum cyst     TRACHEOSTOMY  2007    emergency following failed intubation during cholecystectomy     TYMPANOSTOMY, LOCAL/TOPICAL ANESTHESIA  08/2006    PE tube placement     Current Outpatient Medications   Medication Sig Dispense Refill     albuterol (PROAIR HFA/PROVENTIL HFA/VENTOLIN HFA) 108 (90 Base) MCG/ACT inhaler INHALE 2 PUFFS INTO THE LUNGS EVERY 4 HOURS AS NEEDED 25.5 g 5     albuterol (PROVENTIL) (2.5 MG/3ML) 0.083% neb solution Take 1 vial (2.5 mg) by nebulization every 6 hours as needed for shortness of breath or wheezing 60 mL 11     atorvastatin (LIPITOR) 40 MG tablet Take 1 tablet (40 mg) by mouth At Bedtime 90 tablet 4     gabapentin (NEURONTIN) 400 MG capsule Take 2 capsules (800 mg) by mouth 2 times daily 360 capsule 4     ibuprofen (ADVIL/MOTRIN) 800 MG tablet TAKE 1 TABLET BY MOUTH THREE TIMES DAILY AS NEEDED 90 tablet 0     lisinopril (ZESTRIL) 10 MG tablet Take 1 tablet (10 mg) by mouth daily 90 tablet 4     metFORMIN (GLUCOPHAGE) 500 MG tablet TAKE 1 TABLET(500 MG) BY MOUTH EVERY DAY WITH BREAKFAST 90 tablet 4     PARoxetine (PAXIL) 20 MG tablet TAKE 1 TABLET(20 MG) BY MOUTH EVERY MORNING 90 tablet 4      "SUMAtriptan (IMITREX) 50 MG tablet Take 1 tablet (50 mg) by mouth at onset of headache for migraine May repeat in 2 hours. Max 4 tablets/24 hours. 9 tablet 1     aspirin 81 MG EC tablet Take 1 tablet (81 mg) by mouth 2 times daily (Patient not taking: Reported on 8/4/2023) 60 tablet 0     doxycycline hyclate (VIBRAMYCIN) 100 MG capsule  (Patient not taking: Reported on 5/9/2023)       nicotine (NICODERM CQ) 14 MG/24HR 24 hr patch Place 1 patch onto the skin every 24 hours (Patient not taking: Reported on 5/9/2023) 30 patch 1     nicotine (NICODERM CQ) 7 MG/24HR 24 hr patch Place 1 patch onto the skin every 24 hours (Patient not taking: Reported on 5/9/2023) 30 patch 1     order for DME Equipment being ordered: home neb set up with mask and tubing 1 Device 0     oxyCODONE (ROXICODONE) 5 MG tablet Take 1-2 tablets (5-10 mg) by mouth every 6 hours as needed for moderate to severe pain (Patient not taking: Reported on 5/9/2023) 16 tablet 0       Allergies   Allergen Reactions     Adhesive Tape      Bee Pollen Swelling     Seasonal     Bee Venom Unknown     Folic Acid Itching     Meloxicam Unknown     Pollen Extract      Seasonal     Amoxicillin Rash     Liquid Adhesive Rash     Nabumetone GI Disturbance     GI upset     Seasonal Allergies Other (See Comments)     Pollen        Social History     Tobacco Use     Smoking status: Every Day     Packs/day: 1.00     Years: 32.00     Pack years: 32.00     Types: Cigarettes     Smokeless tobacco: Never   Substance Use Topics     Alcohol use: No     Alcohol/week: 0.0 standard drinks of alcohol       History   Drug Use No         Objective     BP (!) 152/84   Pulse 76   Temp 97.8  F (36.6  C) (Tympanic)   Resp 18   Ht 1.66 m (5' 5.35\")   Wt 81.1 kg (178 lb 12.8 oz)   LMP  (LMP Unknown)   SpO2 90%   BMI 29.43 kg/m      Physical Exam  Vitals reviewed.   Constitutional:       General: She is not in acute distress.     Appearance: She is well-developed.   HENT:      Head: " Normocephalic and atraumatic.      Nose: Nose normal.      Mouth/Throat:      Pharynx: No oropharyngeal exudate.   Eyes:      General: No scleral icterus.     Conjunctiva/sclera: Conjunctivae normal.      Pupils: Pupils are equal, round, and reactive to light.   Neck:      Thyroid: No thyromegaly.   Cardiovascular:      Rate and Rhythm: Normal rate and regular rhythm.      Heart sounds: No murmur heard.  Pulmonary:      Effort: Pulmonary effort is normal. No respiratory distress.      Breath sounds: Normal breath sounds. No wheezing.   Musculoskeletal:         General: No tenderness or deformity. Normal range of motion.      Cervical back: Normal range of motion and neck supple.   Skin:     General: Skin is warm and dry.      Findings: No rash.   Neurological:      Mental Status: She is alert and oriented to person, place, and time.      Cranial Nerves: No cranial nerve deficit.      Coordination: Coordination normal.   Psychiatric:         Behavior: Behavior normal.         Thought Content: Thought content normal.         Judgment: Judgment normal.             Diagnostics:  Recent Results (from the past 168 hour(s))   Vitamin D Total    Collection Time: 08/04/23  9:45 AM   Result Value Ref Range    Vitamin D, Total (25-Hydroxy) 26 20 - 75 ug/L   Comprehensive Metabolic Panel    Collection Time: 08/04/23  9:45 AM   Result Value Ref Range    Sodium 141 136 - 145 mmol/L    Potassium 4.0 3.4 - 5.3 mmol/L    Chloride 104 98 - 107 mmol/L    Carbon Dioxide (CO2) 26 22 - 29 mmol/L    Anion Gap 11 7 - 15 mmol/L    Urea Nitrogen 23.8 (H) 6.0 - 20.0 mg/dL    Creatinine 0.98 (H) 0.51 - 0.95 mg/dL    Calcium 9.5 8.6 - 10.0 mg/dL    Glucose 114 (H) 70 - 99 mg/dL    Alkaline Phosphatase 79 35 - 104 U/L    AST 19 0 - 45 U/L    ALT 17 0 - 50 U/L    Protein Total 6.8 6.4 - 8.3 g/dL    Albumin 4.2 3.5 - 5.2 g/dL    Bilirubin Total 0.3 <=1.2 mg/dL    GFR Estimate 68 >60 mL/min/1.73m2   Hemoglobin A1c    Collection Time: 08/04/23  9:45  AM   Result Value Ref Range    Hemoglobin A1C 5.7 4.0 - 6.2 %   CBC with platelets and differential    Collection Time: 08/04/23  9:45 AM   Result Value Ref Range    WBC Count 8.4 4.0 - 11.0 10e3/uL    RBC Count 4.57 3.80 - 5.20 10e6/uL    Hemoglobin 14.8 11.7 - 15.7 g/dL    Hematocrit 43.5 35.0 - 47.0 %    MCV 95 78 - 100 fL    MCH 32.4 26.5 - 33.0 pg    MCHC 34.0 31.5 - 36.5 g/dL    RDW 14.3 10.0 - 15.0 %    Platelet Count 327 150 - 450 10e3/uL    % Neutrophils 51 %    % Lymphocytes 32 %    % Monocytes 13 %    % Eosinophils 2 %    % Basophils 2 %    % Immature Granulocytes 0 %    NRBCs per 100 WBC 0 <1 /100    Absolute Neutrophils 4.3 1.6 - 8.3 10e3/uL    Absolute Lymphocytes 2.7 0.8 - 5.3 10e3/uL    Absolute Monocytes 1.1 0.0 - 1.3 10e3/uL    Absolute Eosinophils 0.2 0.0 - 0.7 10e3/uL    Absolute Basophils 0.1 0.0 - 0.2 10e3/uL    Absolute Immature Granulocytes 0.0 <=0.4 10e3/uL    Absolute NRBCs 0.0 10e3/uL      No EKG required, no history of coronary heart disease, significant arrhythmia, peripheral arterial disease or other structural heart disease.    Revised Cardiac Risk Index (RCRI):  The patient has the following serious cardiovascular risks for perioperative complications:   - No serious cardiac risks = 0 points     RCRI Interpretation: 0 points: Class I (very low risk - 0.4% complication rate)           Signed Electronically by: AMARILIS Hernandez CNP  Copy of this evaluation report is provided to requesting physician.

## 2023-08-04 NOTE — NURSING NOTE
"Patient presents to clinic for pre op exam for hardware removal on big toe  Pulse 76   Temp 97.8  F (36.6  C) (Tympanic)   Resp 18   Ht 1.66 m (5' 5.35\")   Wt 81.1 kg (178 lb 12.8 oz)   LMP  (LMP Unknown)   SpO2 90%   BMI 29.43 kg/m  .  Laurence Rodríguez LPN on 8/4/2023 at 9:19 AM    "

## 2023-08-04 NOTE — LETTER
August 7, 2023      Alejandra Villegas  [address]  [place] [address]        Dear ,    We are writing to inform you of your test results.    Lab work today is stable, vitamin D level shows that you do have vitamin D insufficiency.  I recommend that you take 2000 units of vitamin D daily.    Resulted Orders   Vitamin D Total   Result Value Ref Range    Vitamin D, Total (25-Hydroxy) 26 20 - 75 ug/L    Narrative    Season, race, dietary intake, and treatment affect the concentration of 25-hydroxy-Vitamin D. Values may decrease during winter months and increase during summer months. Values 20-29 ug/L may indicate Vitamin D insufficiency and values <20 ug/L may indicate Vitamin D deficiency.    Vitamin D determination is routinely performed by an immunoassay specific for 25 hydroxyvitamin D3.  If an individual is on vitamin D2(ergocalciferol) supplementation, please specify 25 OH vitamin D2 and D3 level determination by LCMSMS test [id number].     Comprehensive Metabolic Panel   Result Value Ref Range    Sodium 141 136 - 145 mmol/L    Potassium 4.0 3.4 - 5.3 mmol/L    Chloride 104 98 - 107 mmol/L    Carbon Dioxide (CO2) 26 22 - 29 mmol/L    Anion Gap 11 7 - 15 mmol/L    Urea Nitrogen 23.8 (H) 6.0 - 20.0 mg/dL    Creatinine 0.98 (H) 0.51 - 0.95 mg/dL    Calcium 9.5 8.6 - 10.0 mg/dL    Glucose 114 (H) 70 - 99 mg/dL    Alkaline Phosphatase 79 35 - 104 U/L    AST 19 0 - 45 U/L      Comment:      Reference intervals for this test were updated on 6/12/2023 to more accurately reflect our healthy population. There may be differences in the flagging of prior results with similar values performed with this method. Interpretation of those prior results can be made in the context of the updated reference intervals.    ALT 17 0 - 50 U/L      Comment:      Reference intervals for this test were updated on 6/12/2023 to more accurately reflect our healthy population. There may be differences in the flagging of prior  results with similar values performed with this method. Interpretation of those prior results can be made in the context of the updated reference intervals.      Protein Total 6.8 6.4 - 8.3 g/dL    Albumin 4.2 3.5 - 5.2 g/dL    Bilirubin Total 0.3 <=1.2 mg/dL    GFR Estimate 68 >60 mL/min/1.73m2   Hemoglobin A1c   Result Value Ref Range    Hemoglobin A1C 5.7 4.0 - 6.2 %   CBC with platelets and differential   Result Value Ref Range    WBC Count 8.4 4.0 - 11.0 10e3/uL    RBC Count 4.57 3.80 - 5.20 10e6/uL    Hemoglobin 14.8 11.7 - 15.7 g/dL    Hematocrit 43.5 35.0 - 47.0 %    MCV 95 78 - 100 fL    MCH 32.4 26.5 - 33.0 pg    MCHC 34.0 31.5 - 36.5 g/dL    RDW 14.3 10.0 - 15.0 %    Platelet Count 327 150 - 450 10e3/uL    % Neutrophils 51 %    % Lymphocytes 32 %    % Monocytes 13 %    % Eosinophils 2 %    % Basophils 2 %    % Immature Granulocytes 0 %    NRBCs per 100 WBC 0 <1 /100    Absolute Neutrophils 4.3 1.6 - 8.3 10e3/uL    Absolute Lymphocytes 2.7 0.8 - 5.3 10e3/uL    Absolute Monocytes 1.1 0.0 - 1.3 10e3/uL    Absolute Eosinophils 0.2 0.0 - 0.7 10e3/uL    Absolute Basophils 0.1 0.0 - 0.2 10e3/uL    Absolute Immature Granulocytes 0.0 <=0.4 10e3/uL    Absolute NRBCs 0.0 10e3/uL       If you have any questions or concerns, please call the clinic at the number listed above.       Sincerely,      AMARILIS Hernandez CNP

## 2023-08-05 LAB — DEPRECATED CALCIDIOL+CALCIFEROL SERPL-MC: 26 UG/L (ref 20–75)

## 2023-08-09 ENCOUNTER — ANESTHESIA EVENT (OUTPATIENT)
Dept: SURGERY | Facility: OTHER | Age: 58
End: 2023-08-09
Payer: MEDICARE

## 2023-08-09 RX ORDER — FENTANYL CITRATE 50 UG/ML
50 INJECTION, SOLUTION INTRAMUSCULAR; INTRAVENOUS EVERY 5 MIN PRN
Status: CANCELLED | OUTPATIENT
Start: 2023-08-09

## 2023-08-09 RX ORDER — HYDROMORPHONE HCL IN WATER/PF 6 MG/30 ML
0.4 PATIENT CONTROLLED ANALGESIA SYRINGE INTRAVENOUS EVERY 5 MIN PRN
Status: CANCELLED | OUTPATIENT
Start: 2023-08-09

## 2023-08-09 RX ORDER — ONDANSETRON 2 MG/ML
4 INJECTION INTRAMUSCULAR; INTRAVENOUS EVERY 30 MIN PRN
Status: CANCELLED | OUTPATIENT
Start: 2023-08-09

## 2023-08-09 RX ORDER — FENTANYL CITRATE 50 UG/ML
25 INJECTION, SOLUTION INTRAMUSCULAR; INTRAVENOUS EVERY 5 MIN PRN
Status: CANCELLED | OUTPATIENT
Start: 2023-08-09

## 2023-08-09 RX ORDER — HYDROMORPHONE HCL IN WATER/PF 6 MG/30 ML
0.2 PATIENT CONTROLLED ANALGESIA SYRINGE INTRAVENOUS EVERY 5 MIN PRN
Status: CANCELLED | OUTPATIENT
Start: 2023-08-09

## 2023-08-09 RX ORDER — ONDANSETRON 4 MG/1
4 TABLET, ORALLY DISINTEGRATING ORAL EVERY 30 MIN PRN
Status: CANCELLED | OUTPATIENT
Start: 2023-08-09

## 2023-08-09 RX ORDER — SODIUM CHLORIDE, SODIUM LACTATE, POTASSIUM CHLORIDE, CALCIUM CHLORIDE 600; 310; 30; 20 MG/100ML; MG/100ML; MG/100ML; MG/100ML
INJECTION, SOLUTION INTRAVENOUS CONTINUOUS
Status: CANCELLED | OUTPATIENT
Start: 2023-08-09

## 2023-08-10 ENCOUNTER — HOSPITAL ENCOUNTER (OUTPATIENT)
Facility: OTHER | Age: 58
Discharge: HOME | End: 2023-08-10
Attending: PODIATRIST | Admitting: PODIATRIST
Payer: MEDICARE

## 2023-08-10 ENCOUNTER — ANESTHESIA (OUTPATIENT)
Dept: SURGERY | Facility: OTHER | Age: 58
End: 2023-08-10
Payer: MEDICARE

## 2023-08-10 VITALS
SYSTOLIC BLOOD PRESSURE: 92 MMHG | RESPIRATION RATE: 14 BRPM | OXYGEN SATURATION: 91 % | HEIGHT: 65 IN | BODY MASS INDEX: 29.66 KG/M2 | DIASTOLIC BLOOD PRESSURE: 61 MMHG | HEART RATE: 62 BPM | WEIGHT: 178 LBS | TEMPERATURE: 98.1 F

## 2023-08-10 DIAGNOSIS — G89.18 POST-OP PAIN: Primary | ICD-10-CM

## 2023-08-10 LAB — GLUCOSE BLDC GLUCOMTR-MCNC: 106 MG/DL (ref 70–99)

## 2023-08-10 PROCEDURE — 272N000001 HC OR GENERAL SUPPLY STERILE: Performed by: PODIATRIST

## 2023-08-10 PROCEDURE — 710N000012 HC RECOVERY PHASE 2, PER MINUTE: Performed by: PODIATRIST

## 2023-08-10 PROCEDURE — 250N000009 HC RX 250: Performed by: PODIATRIST

## 2023-08-10 PROCEDURE — 250N000009 HC RX 250: Performed by: NURSE ANESTHETIST, CERTIFIED REGISTERED

## 2023-08-10 PROCEDURE — 250N000011 HC RX IP 250 OP 636: Performed by: PODIATRIST

## 2023-08-10 PROCEDURE — 360N000074 HC SURGERY LEVEL 1, PER MIN: Performed by: PODIATRIST

## 2023-08-10 PROCEDURE — 999N000157 HC STATISTIC RCP TIME EA 10 MIN

## 2023-08-10 PROCEDURE — 20680 REMOVAL OF IMPLANT DEEP: CPT | Performed by: PODIATRIST

## 2023-08-10 PROCEDURE — 999N000141 HC STATISTIC PRE-PROCEDURE NURSING ASSESSMENT: Performed by: PODIATRIST

## 2023-08-10 PROCEDURE — 370N000017 HC ANESTHESIA TECHNICAL FEE, PER MIN: Performed by: PODIATRIST

## 2023-08-10 PROCEDURE — 258N000003 HC RX IP 258 OP 636: Performed by: NURSE ANESTHETIST, CERTIFIED REGISTERED

## 2023-08-10 PROCEDURE — 82962 GLUCOSE BLOOD TEST: CPT

## 2023-08-10 PROCEDURE — 20680 REMOVAL OF IMPLANT DEEP: CPT | Performed by: NURSE ANESTHETIST, CERTIFIED REGISTERED

## 2023-08-10 PROCEDURE — 94640 AIRWAY INHALATION TREATMENT: CPT

## 2023-08-10 PROCEDURE — 250N000011 HC RX IP 250 OP 636: Performed by: NURSE ANESTHETIST, CERTIFIED REGISTERED

## 2023-08-10 RX ORDER — OXYCODONE HYDROCHLORIDE 5 MG/1
10 TABLET ORAL
Status: DISCONTINUED | OUTPATIENT
Start: 2023-08-10 | End: 2023-08-10 | Stop reason: HOSPADM

## 2023-08-10 RX ORDER — ONDANSETRON 4 MG/1
4 TABLET, ORALLY DISINTEGRATING ORAL EVERY 30 MIN PRN
Status: DISCONTINUED | OUTPATIENT
Start: 2023-08-10 | End: 2023-08-10 | Stop reason: HOSPADM

## 2023-08-10 RX ORDER — PROPOFOL 10 MG/ML
INJECTION, EMULSION INTRAVENOUS PRN
Status: DISCONTINUED | OUTPATIENT
Start: 2023-08-10 | End: 2023-08-10

## 2023-08-10 RX ORDER — OXYCODONE HYDROCHLORIDE 5 MG/1
5 TABLET ORAL EVERY 6 HOURS PRN
Qty: 12 TABLET | Refills: 0 | Status: SHIPPED | OUTPATIENT
Start: 2023-08-10 | End: 2024-01-23

## 2023-08-10 RX ORDER — CLINDAMYCIN PHOSPHATE 900 MG/50ML
900 INJECTION, SOLUTION INTRAVENOUS
Status: COMPLETED | OUTPATIENT
Start: 2023-08-10 | End: 2023-08-10

## 2023-08-10 RX ORDER — CLINDAMYCIN PHOSPHATE 900 MG/50ML
900 INJECTION, SOLUTION INTRAVENOUS SEE ADMIN INSTRUCTIONS
Status: DISCONTINUED | OUTPATIENT
Start: 2023-08-10 | End: 2023-08-10 | Stop reason: HOSPADM

## 2023-08-10 RX ORDER — LIDOCAINE 40 MG/G
CREAM TOPICAL
Status: DISCONTINUED | OUTPATIENT
Start: 2023-08-10 | End: 2023-08-10 | Stop reason: HOSPADM

## 2023-08-10 RX ORDER — ONDANSETRON 2 MG/ML
4 INJECTION INTRAMUSCULAR; INTRAVENOUS EVERY 30 MIN PRN
Status: DISCONTINUED | OUTPATIENT
Start: 2023-08-10 | End: 2023-08-10 | Stop reason: HOSPADM

## 2023-08-10 RX ORDER — MAGNESIUM HYDROXIDE 1200 MG/15ML
LIQUID ORAL PRN
Status: DISCONTINUED | OUTPATIENT
Start: 2023-08-10 | End: 2023-08-10 | Stop reason: HOSPADM

## 2023-08-10 RX ORDER — FENTANYL CITRATE 50 UG/ML
INJECTION, SOLUTION INTRAMUSCULAR; INTRAVENOUS PRN
Status: DISCONTINUED | OUTPATIENT
Start: 2023-08-10 | End: 2023-08-10

## 2023-08-10 RX ORDER — ACETAMINOPHEN 325 MG/1
1000 TABLET ORAL EVERY 8 HOURS
Qty: 45 TABLET | Refills: 0 | Status: SHIPPED | OUTPATIENT
Start: 2023-08-10 | End: 2023-08-15

## 2023-08-10 RX ORDER — NALOXONE HYDROCHLORIDE 0.4 MG/ML
0.4 INJECTION, SOLUTION INTRAMUSCULAR; INTRAVENOUS; SUBCUTANEOUS
Status: DISCONTINUED | OUTPATIENT
Start: 2023-08-10 | End: 2023-08-10 | Stop reason: HOSPADM

## 2023-08-10 RX ORDER — NALOXONE HYDROCHLORIDE 0.4 MG/ML
0.2 INJECTION, SOLUTION INTRAMUSCULAR; INTRAVENOUS; SUBCUTANEOUS
Status: DISCONTINUED | OUTPATIENT
Start: 2023-08-10 | End: 2023-08-10 | Stop reason: HOSPADM

## 2023-08-10 RX ORDER — OXYCODONE HYDROCHLORIDE 5 MG/1
5 TABLET ORAL
Status: DISCONTINUED | OUTPATIENT
Start: 2023-08-10 | End: 2023-08-10 | Stop reason: HOSPADM

## 2023-08-10 RX ORDER — EPHEDRINE SULFATE 50 MG/ML
INJECTION, SOLUTION INTRAMUSCULAR; INTRAVENOUS; SUBCUTANEOUS PRN
Status: DISCONTINUED | OUTPATIENT
Start: 2023-08-10 | End: 2023-08-10

## 2023-08-10 RX ORDER — ONDANSETRON 2 MG/ML
INJECTION INTRAMUSCULAR; INTRAVENOUS PRN
Status: DISCONTINUED | OUTPATIENT
Start: 2023-08-10 | End: 2023-08-10

## 2023-08-10 RX ORDER — HYDROXYZINE HYDROCHLORIDE 10 MG/1
10 TABLET, FILM COATED ORAL
Status: DISCONTINUED | OUTPATIENT
Start: 2023-08-10 | End: 2023-08-10 | Stop reason: HOSPADM

## 2023-08-10 RX ORDER — IPRATROPIUM BROMIDE AND ALBUTEROL SULFATE 2.5; .5 MG/3ML; MG/3ML
3 SOLUTION RESPIRATORY (INHALATION)
Status: DISCONTINUED | OUTPATIENT
Start: 2023-08-10 | End: 2023-08-10 | Stop reason: HOSPADM

## 2023-08-10 RX ORDER — LIDOCAINE HYDROCHLORIDE 20 MG/ML
INJECTION, SOLUTION INFILTRATION; PERINEURAL PRN
Status: DISCONTINUED | OUTPATIENT
Start: 2023-08-10 | End: 2023-08-10

## 2023-08-10 RX ORDER — SODIUM CHLORIDE, SODIUM LACTATE, POTASSIUM CHLORIDE, CALCIUM CHLORIDE 600; 310; 30; 20 MG/100ML; MG/100ML; MG/100ML; MG/100ML
INJECTION, SOLUTION INTRAVENOUS CONTINUOUS
Status: DISCONTINUED | OUTPATIENT
Start: 2023-08-10 | End: 2023-08-10 | Stop reason: HOSPADM

## 2023-08-10 RX ORDER — KETOROLAC TROMETHAMINE 30 MG/ML
INJECTION, SOLUTION INTRAMUSCULAR; INTRAVENOUS PRN
Status: DISCONTINUED | OUTPATIENT
Start: 2023-08-10 | End: 2023-08-10

## 2023-08-10 RX ORDER — PROPOFOL 10 MG/ML
INJECTION, EMULSION INTRAVENOUS CONTINUOUS PRN
Status: DISCONTINUED | OUTPATIENT
Start: 2023-08-10 | End: 2023-08-10

## 2023-08-10 RX ORDER — FENTANYL CITRATE 50 UG/ML
50 INJECTION, SOLUTION INTRAMUSCULAR; INTRAVENOUS
Status: DISCONTINUED | OUTPATIENT
Start: 2023-08-10 | End: 2023-08-10 | Stop reason: HOSPADM

## 2023-08-10 RX ORDER — ACETAMINOPHEN 10 MG/ML
INJECTION, SOLUTION INTRAVENOUS PRN
Status: DISCONTINUED | OUTPATIENT
Start: 2023-08-10 | End: 2023-08-10

## 2023-08-10 RX ADMIN — FENTANYL CITRATE 50 MCG: 50 INJECTION, SOLUTION INTRAMUSCULAR; INTRAVENOUS at 07:45

## 2023-08-10 RX ADMIN — Medication 5 MG: at 07:49

## 2023-08-10 RX ADMIN — PROPOFOL 40 MG: 10 INJECTION, EMULSION INTRAVENOUS at 07:37

## 2023-08-10 RX ADMIN — ONDANSETRON HYDROCHLORIDE 4 MG: 2 SOLUTION INTRAMUSCULAR; INTRAVENOUS at 07:37

## 2023-08-10 RX ADMIN — IPRATROPIUM BROMIDE AND ALBUTEROL SULFATE 3 ML: 2.5; .5 SOLUTION RESPIRATORY (INHALATION) at 07:23

## 2023-08-10 RX ADMIN — KETOROLAC TROMETHAMINE 30 MG: 30 INJECTION, SOLUTION INTRAMUSCULAR at 07:37

## 2023-08-10 RX ADMIN — CLINDAMYCIN PHOSPHATE 900 MG: 900 INJECTION, SOLUTION INTRAVENOUS at 07:28

## 2023-08-10 RX ADMIN — FENTANYL CITRATE 25 MCG: 50 INJECTION, SOLUTION INTRAMUSCULAR; INTRAVENOUS at 07:42

## 2023-08-10 RX ADMIN — PROPOFOL 75 MCG/KG/MIN: 10 INJECTION, EMULSION INTRAVENOUS at 07:37

## 2023-08-10 RX ADMIN — PROPOFOL 20 MG: 10 INJECTION, EMULSION INTRAVENOUS at 07:42

## 2023-08-10 RX ADMIN — SODIUM CHLORIDE, POTASSIUM CHLORIDE, SODIUM LACTATE AND CALCIUM CHLORIDE 100 ML/HR: 600; 310; 30; 20 INJECTION, SOLUTION INTRAVENOUS at 07:27

## 2023-08-10 RX ADMIN — LIDOCAINE HYDROCHLORIDE 40 MG: 20 INJECTION, SOLUTION INFILTRATION; PERINEURAL at 07:37

## 2023-08-10 RX ADMIN — ACETAMINOPHEN 1000 MG: 10 INJECTION, SOLUTION INTRAVENOUS at 07:37

## 2023-08-10 RX ADMIN — FENTANYL CITRATE 25 MCG: 50 INJECTION, SOLUTION INTRAMUSCULAR; INTRAVENOUS at 07:37

## 2023-08-10 ASSESSMENT — ACTIVITIES OF DAILY LIVING (ADL)
ADLS_ACUITY_SCORE: 35
ADLS_ACUITY_SCORE: 35

## 2023-08-10 ASSESSMENT — LIFESTYLE VARIABLES: TOBACCO_USE: 1

## 2023-08-10 NOTE — OR NURSING
Patient has been discharged to home at 0900 via w/c accompanied by her sister     Written discharge instructions were provided to patient.  Prescriptions were e-script. Patient states pain is 0/10 and denies any nausea or dizziness upon discharge.      Patient and adult caring for them verbalize understanding of discharge instructions including no driving until tomorrow and no longer taking narcotic pain medications - no operating mechanical equipment and no making any important decisions.They understand reason for discharge, and necessary follow-up appointments.       Veronika Beltre RN

## 2023-08-10 NOTE — ANESTHESIA PREPROCEDURE EVALUATION
Anesthesia Pre-Procedure Evaluation    Patient: Alejandra Villegas   MRN: [id number] : 1965        Procedure : Procedure(s):  Right fibular sesmoid excision and 1st metatarsal phalangeal joint fusion vs. metatarsal phalangeal joint capsular repair          Past Medical History:   Diagnosis Date     Allergic rhinitis 2011          Disorder of kidney and ureter 2008    Kidney disease GFR 87     Dorsalgia     No Comments Provided     Excessive and frequent menstruation with regular cycle     Menorrhagia     Generalized anxiety disorder     No Comments Provided     Hidradenitis suppurativa     No Comments Provided     Hyperlipidemia     No Comments Provided     Obesity 2007    Obesity  Body-mass index over 30     Other disorders of lung (CODE) 2007    Pulmonary nodules, stable on follow-up chest CT .  No further imaging recommended.     Other specified disorders of Eustachian tube, unspecified ear 2012          Other specified phobia     No Comments Provided     Personal history of other medical treatment (CODE)     Childbirth x4     Rheumatoid arthritis (H) 2012          Tobacco use     No Comments Provided     Type 2 diabetes mellitus without complications (H) 2007          Uncomplicated asthma     No Comments Provided      Past Surgical History:   Procedure Laterality Date     ARTHROSCOPY KNEE  2017    Dr Torres     ARTHROSCOPY KNEE  2017    Dr Garza, lateral release, meniscal repair     ARTHROTOMY WRIST Left     Dr. Torres     CHOLECYSTECTOMY  2007    Emergency tracheotomy following failed intubation for laparoscopic cholecystectomy.     DILATION AND CURETTAGE  2006          HERNIA REPAIR  2007    Incisoinal hernia repair     HYSTERECTOMY TOTAL ABDOMINAL  2010          IMPLANT STIMULATOR AND LEADS SACRAL NERVE (STAGE ONE AND TWO) N/A 2018    Procedure: Interstim Battery Replacement;  Surgeon: Rl Ambriz MD;  Location:  OR      INTERSTIM DEVICE STAGE 2  01/06/2014    Dr. Ambriz - [facility]     LAPAROSCOPIC ABLATION ENDOMETRIOSIS  09/2006          OOPHORECTOMY Left 2010     Dr Thorpe     RECONSTRUCT FOREFOOT WITH METATARSOPHALANGEAL (MTP) FUSION Right 5/5/2022    Procedure: Right fibular sesmoid excision and 1st metatarsal phalangeal joint fusion;  Surgeon: Rl Nathan DPM;  Location: [facility] OR     RELEASE CARPAL TUNNEL  02/02/2017          SALPINGO OOPHORECTOMY,R/L/KELSEY Right 06/1999    Right salpingo-oophorectomy for hemorrhagic corpus luteum cyst     TRACHEOSTOMY  2007    emergency following failed intubation during cholecystectomy     TYMPANOSTOMY, LOCAL/TOPICAL ANESTHESIA  08/2006    PE tube placement      Allergies   Allergen Reactions     Adhesive Tape      Bee Pollen Swelling     Seasonal     Bee Venom Unknown     Folic Acid Itching     Meloxicam Unknown     Pollen Extract      Seasonal     Amoxicillin Rash     Liquid Adhesive Rash     Nabumetone GI Disturbance     GI upset     Seasonal Allergies Other (See Comments)     Pollen      Social History     Tobacco Use     Smoking status: Every Day     Packs/day: 1.00     Years: 32.00     Pack years: 32.00     Types: Cigarettes     Smokeless tobacco: Never   Substance Use Topics     Alcohol use: No     Alcohol/week: 0.0 standard drinks of alcohol      Wt Readings from Last 1 Encounters:   08/04/23 81.1 kg (178 lb 12.8 oz)        Anesthesia Evaluation   Pt has had prior anesthetic. Type: General.    History of anesthetic complications  - difficult airway.  hx of failed intubation with laparoscopic cholecystectomy in 2007.    ROS/MED HX  ENT/Pulmonary: Comment: Breathing is baseline    (+)           allergic rhinitis,     tobacco use, Current use, 1 packs/day,  Intermittent, asthma  Treatment: Inhaler prn,                 Neurologic:       Cardiovascular:     (+) Dyslipidemia hypertension- -   -  - -           SALCEDO.                           METS/Exercise Tolerance: 3 - Able to walk 1-2 blocks  without stopping    Hematologic:       Musculoskeletal:   (+)  arthritis,             GI/Hepatic:     (+) GERD, Asymptomatic on medication,                  Renal/Genitourinary:     (+) renal disease,             Endo:     (+)  type II DM, Last HgA1c: 5.7,            Obesity,       Psychiatric/Substance Use:       Infectious Disease:  - neg infectious disease ROS     Malignancy:  - neg malignancy ROS     Other:  - neg other ROS          Physical Exam    Airway        Mallampati: III   TM distance: > 3 FB   Neck ROM: full   Mouth opening: > 3 cm    Respiratory Devices and Support         Dental       (+) upper dentures and missing      Cardiovascular   cardiovascular exam normal       Rhythm and rate: regular and normal     Pulmonary       Comment: Duoneb ordered this am    (+) decreased breath sounds         OUTSIDE LABS:  CBC:   Lab Results   Component Value Date    WBC 8.4 08/04/2023    WBC 4.8 05/28/2022    HGB 14.8 08/04/2023    HGB 14.8 05/28/2022    HCT 43.5 08/04/2023    HCT 42.4 05/28/2022     08/04/2023     05/28/2022     BMP:   Lab Results   Component Value Date     08/04/2023     (L) 05/28/2022    POTASSIUM 4.0 08/04/2023    POTASSIUM 3.7 05/28/2022    CHLORIDE 104 08/04/2023    CHLORIDE 98 05/28/2022    CO2 26 08/04/2023    CO2 24 05/28/2022    BUN 23.8 (H) 08/04/2023    BUN 9 05/28/2022    CR 0.98 (H) 08/04/2023    CR 0.81 05/28/2022     (H) 08/04/2023     (H) 05/28/2022     COAGS: No results found for: PTT, INR, FIBR  POC: No results found for: BGM, HCG, HCGS  HEPATIC:   Lab Results   Component Value Date    ALBUMIN 4.2 08/04/2023    PROTTOTAL 6.8 08/04/2023    ALT 17 08/04/2023    AST 19 08/04/2023    ALKPHOS 79 08/04/2023    BILITOTAL 0.3 08/04/2023     OTHER:   Lab Results   Component Value Date    A1C 5.7 08/04/2023    TOBIN 9.5 08/04/2023       Anesthesia Plan    ASA Status:  3    NPO Status:  NPO Appropriate    Anesthesia Type: MAC.     - Reason for MAC:  straight local not clinically adequate   Induction: Intravenous.   Maintenance: Balanced.        Consents    Anesthesia Plan(s) and associated risks, benefits, and realistic alternatives discussed. Questions answered and patient/representative(s) expressed understanding.     - Discussed: Risks, Benefits and Alternatives for BOTH SEDATION and the PROCEDURE were discussed     - Discussed with:  Patient            Postoperative Care    Pain management: IV analgesics, Multi-modal analgesia.   PONV prophylaxis: Ondansetron (or other 5HT-3)     Comments:    Other Comments: Risks, benefits and alternatives discussed and would like to proceed. General anesthesia ok if indicated.               AMARILIS MCNEILL CRNA

## 2023-08-10 NOTE — ANESTHESIA POSTPROCEDURE EVALUATION
Patient: Alejandra Villegas    Procedure: Procedure(s):  REMOVAL, HARDWARE, FOOT       Anesthesia Type:  MAC    Note:  Disposition: Outpatient   Postop Pain Control: Uneventful            Sign Out: Well controlled pain   PONV: No   Neuro/Psych: Uneventful            Sign Out: Acceptable/Baseline neuro status   Airway/Respiratory: Uneventful            Sign Out: Acceptable/Baseline resp. status   CV/Hemodynamics: Uneventful            Sign Out: Acceptable CV status; No obvious hypovolemia; No obvious fluid overload   Other NRE: NONE   DID A NON-ROUTINE EVENT OCCUR?            Last vitals:  Vitals Value Taken Time   BP     Temp     Pulse     Resp     SpO2 91 % 08/10/23 0815   Vitals shown include unvalidated device data.    Electronically Signed By: AMARILIS MCNEILL CRNA  August 10, 2023  8:16 AM

## 2023-08-10 NOTE — INTERVAL H&P NOTE
"I have reviewed the surgical (or preoperative) H&P that is linked to this encounter, and examined the patient. There are no significant changes    Clinical Conditions Present on Arrival:  Clinically Significant Risk Factors Present on Admission                 # Drug Induced Platelet Defect: home medication list includes an antiplatelet medication  # Overweight: Estimated body mass index is 29.43 kg/m  as calculated from the following:    Height as of 8/4/23: 1.66 m (5' 5.35\").    Weight as of 8/4/23: 81.1 kg (178 lb 12.8 oz).       "

## 2023-08-10 NOTE — OP NOTE
SURGEON:  Rl Nathan DPM.     ASSISTANT:  Katherine Duque PA-C.      A skilled first assistant was necessary due to technical complexity of the procedure and for the patient's safety.  The assistant helped with positioning, retraction, visualization of the operative field and moreover helped to complete the procedure in a technically safe and efficient manner.       PREOPERATIVE DIAGNOSIS:   Painful hardware right foot     POSTOPERATIVE DIAGNOSIS:   Same as preoperative diagnosis     PROCEDURE: Hardware removal right foot     ANESTHESIA: MAC with local     HEMOSTASIS: None.     ESTIMATED BLOOD LOSS: 10 cc    INDICATIONS FOR PROCEDURE: The patient has pain associated with the hardware from a previous successful first MTPJ fusion.  She elects proceed with removal after detailed discussion of this.    MATERIALS: None     PROCEDURE: Supine positioning utilized.  MAC anesthesia and a local block performed.  Right lower extremity prepped and draped in the usual sterile fashion.  No anesthesia or tourniquet was utilized.    Attention directed to dorsal medial right first MPJ.  Incision made largely and full-thickness at previous incision interval.  Plate and 6 screws within the plate identified and removed without incident.  Interfrag cannulated screw also removed without incident.  This was flushed with saline deep tissue was repaired with 2.0 Vicryl and skin with nonabsorbable suture.  She was placed in a sterile dressing and a postop shoe and she can weight-bear as tolerated and will follow-up as scheduled.      Rl Nathan DPM  8/10/2023

## 2023-08-10 NOTE — INTERVAL H&P NOTE
"I have reviewed the surgical (or preoperative) H&P that is linked to this encounter, and examined the patient. There are no significant changes    Clinical Conditions Present on Arrival:  Clinically Significant Risk Factors Present on Admission                 # Drug Induced Platelet Defect: home medication list includes an antiplatelet medication  # Overweight: Estimated body mass index is 29.62 kg/m  as calculated from the following:    Height as of this encounter: 1.651 m (5' 5\").    Weight as of this encounter: 80.7 kg (178 lb).       "

## 2023-08-10 NOTE — ANESTHESIA CARE TRANSFER NOTE
Patient: Alejandra Villegas    Procedure: Procedure(s):  REMOVAL, HARDWARE, FOOT       Diagnosis: Painful orthopaedic hardware (H) [T84.84XA]  Diagnosis Additional Information: No value filed.    Anesthesia Type:   MAC     Note:    Oropharynx: oropharynx clear of all foreign objects  Level of Consciousness: awake  Oxygen Supplementation: room air    Independent Airway: airway patency satisfactory and stable    Vital Signs Stable: post-procedure vital signs reviewed and stable  Report to RN Given: handoff report given  Patient transferred to: Phase II    Handoff Report: Identifed the Patient, Identified the Reponsible Provider, Reviewed the pertinent medical history, Discussed the surgical course, Reviewed Intra-OP anesthesia mangement and issues during anesthesia, Set expectations for post-procedure period and Allowed opportunity for questions and acknowledgement of understanding      Vitals:  Vitals Value Taken Time   BP     Temp     Pulse     Resp     SpO2         Electronically Signed By: AMARILIS MCNEILL CRNA  August 10, 2023  8:06 AM

## 2023-08-10 NOTE — BRIEF OP NOTE
[facility]    Brief Operative Note    Pre-operative diagnosis: Painful orthopaedic hardware (H) [T84.84XA]  Post-operative diagnosis Same as pre-operative diagnosis    Procedure: Procedure(s):  REMOVAL, HARDWARE, FOOT  Surgeon: Surgeon(s) and Role:     * Rl Nathan DPM - Primary  Anesthesia: MAC with Local   Estimated Blood Loss: Minimal    Drains: None  Specimens: * No specimens in log *  Findings:   None.  Complications: None.  Implants: * No implants in log *

## 2023-08-10 NOTE — DISCHARGE INSTRUCTIONS
[facility]  Adult Discharge Orders & Instructions    ________________________________________________________________          For 12 hours after surgery  Get plenty of rest.  A responsible adult must stay with you for at least 12 hours after you leave the hospital.   You may feel lightheaded.  IF so, sit for a few minutes before standing.  Have someone help you get up.   You may have a slight fever. Call the doctor if your fever is over 101 F (38.3 C) (taken under the tongue) or lasts longer than 24 hours.  You may have a dry mouth, a sore throat, muscle aches or trouble sleeping.  These should go away after 24 hours.  Do not make important or legal decisions.  6.   Do not drive or use heavy equipment.  If you have weakness or tingling, don't drive or use heavy equipment until this feeling goes away.    To contact a doctor, call   [phone]_______________________      Surgeon Contact Information    If you have questions or concerns related to your procedure please contact your surgeon through [facility] at [phone].      .

## 2023-08-14 ENCOUNTER — TELEPHONE (OUTPATIENT)
Dept: ORTHOPEDICS | Facility: OTHER | Age: 58
End: 2023-08-14
Payer: MEDICARE

## 2023-08-14 NOTE — TELEPHONE ENCOUNTER
Patient called requesting a refill of her pain medication.  She has surgery with Dr. Nathan on 8/10.  Her post-op is scheduled for 8/24.  Tammy Grimm on 8/14/2023 at 9:48 AM

## 2023-08-15 DIAGNOSIS — E78.5 DYSLIPIDEMIA: ICD-10-CM

## 2023-08-16 RX ORDER — ATORVASTATIN CALCIUM 40 MG/1
40 TABLET, FILM COATED ORAL AT BEDTIME
Qty: 90 TABLET | Refills: 4 | Status: SHIPPED | OUTPATIENT
Start: 2023-08-16 | End: 2024-01-23

## 2023-08-16 NOTE — TELEPHONE ENCOUNTER
Last Prescription Date: 3/31/22  Last Qty/Refills: 90 / 4  Last Office Visit: 8/4/23  Future Office Visit: none     Corinne R Thayer, RN on 8/16/2023 at 9:06 AM    Requested Prescriptions   Pending Prescriptions Disp Refills    atorvastatin (LIPITOR) 40 MG tablet 90 tablet 4     Sig: Take 1 tablet (40 mg) by mouth At Bedtime       Statins Protocol Failed - 8/15/2023  8:34 AM        Failed - LDL on file in past 12 months     Recent Labs   Lab Test 03/24/20  1614   LDL 89

## 2023-08-17 ENCOUNTER — OFFICE VISIT (OUTPATIENT)
Dept: ORTHOPEDICS | Facility: OTHER | Age: 58
End: 2023-08-17
Attending: PODIATRIST
Payer: MEDICARE

## 2023-08-17 DIAGNOSIS — T84.84XA PAINFUL ORTHOPAEDIC HARDWARE (H): Primary | ICD-10-CM

## 2023-08-17 PROCEDURE — 99207 PR NO CHARGE NURSE ONLY: CPT

## 2023-08-17 PROCEDURE — G0463 HOSPITAL OUTPT CLINIC VISIT: HCPCS

## 2023-08-17 NOTE — PROGRESS NOTES
Patient presents to clinic at 's request for dressing change from right foot hardware removal done one week ago. Incision is clean, dry and intact. No redness or drainage. A new sterile dressing was applied. Patient informed to keep the incision clean and dry. Patient will follow up as scheduled. Sooner if any problems occur.  Ameena Pierce LPN .....................8/17/2023 2:30 PM

## 2023-08-22 ENCOUNTER — TELEPHONE (OUTPATIENT)
Dept: ORTHOPEDICS | Facility: OTHER | Age: 58
End: 2023-08-22
Payer: MEDICARE

## 2023-08-22 NOTE — TELEPHONE ENCOUNTER
Reason for call: Medication or medication refill    Name of medication requested: Oxy    Are you out of the medication? YES    What pharmacy do you use? [facility]    Preferred method for responding to this message: Telephone Call    Phone number patient can be reached at: Cell number on file:    Telephone Information:   Mobile [phone]       If we cannot reach you directly, may we leave a detailed response at the number you provided? Yes

## 2023-08-24 ENCOUNTER — OFFICE VISIT (OUTPATIENT)
Dept: ORTHOPEDICS | Facility: OTHER | Age: 58
End: 2023-08-24
Attending: PODIATRIST
Payer: MEDICARE

## 2023-08-24 DIAGNOSIS — Z09 POSTOPERATIVE FOLLOW-UP: Primary | ICD-10-CM

## 2023-08-24 PROCEDURE — 99024 POSTOP FOLLOW-UP VISIT: CPT | Performed by: PODIATRIST

## 2023-08-24 PROCEDURE — G0463 HOSPITAL OUTPT CLINIC VISIT: HCPCS

## 2023-08-24 NOTE — PROGRESS NOTES
SUBJECTIVE:  Patient comes in today for follow-up of her right foot.  She underwent a hardware removal of the right foot by Dr. Nathan on 8/10/2023.  She is here for suture removal and follow-up.  Patient states that she is doing well she was taking oxycodone quite a bit because she has been up on her feet working at [employer] but things have gotten better since last time she was in.  She has no acute concerns today.    ROS: Musculoskeletal and general review of systems are negative, per review of previous clinic questionnaire.  Denies SOB and calf pain.    EXAM: Incision is clean and dry there is no signs of infection the sutures have been removed and Steri-Strips have been applied.  She denies any numbness or tingling she denies any calf pain.  She is nontender to palpation.    IMAGING: No imaging today    ASSESSMENT: Status post hardware removal right foot.    PLAN: Plan we will have her continue weightbearing as tolerated she can continue to wear her postop shoe as it is until she can comfortably put on a regular shoe.  She is to go back to work without any restrictions.  She will continue to take Tylenol or ibuprofen only.  No more narcotic pain medication will be offered to her in the future.    Rl Nathan DPM

## 2023-10-09 ENCOUNTER — OFFICE VISIT (OUTPATIENT)
Dept: FAMILY MEDICINE | Facility: OTHER | Age: 58
End: 2023-10-09
Payer: MEDICARE

## 2023-10-09 VITALS
BODY MASS INDEX: 29.44 KG/M2 | SYSTOLIC BLOOD PRESSURE: 110 MMHG | TEMPERATURE: 97.8 F | RESPIRATION RATE: 14 BRPM | HEART RATE: 75 BPM | DIASTOLIC BLOOD PRESSURE: 64 MMHG | OXYGEN SATURATION: 94 % | WEIGHT: 176.9 LBS

## 2023-10-09 DIAGNOSIS — J06.9 UPPER RESPIRATORY TRACT INFECTION, UNSPECIFIED TYPE: Primary | ICD-10-CM

## 2023-10-09 LAB — SARS-COV-2 RNA RESP QL NAA+PROBE: NEGATIVE

## 2023-10-09 PROCEDURE — 99213 OFFICE O/P EST LOW 20 MIN: CPT | Performed by: STUDENT IN AN ORGANIZED HEALTH CARE EDUCATION/TRAINING PROGRAM

## 2023-10-09 PROCEDURE — C9803 HOPD COVID-19 SPEC COLLECT: HCPCS | Performed by: STUDENT IN AN ORGANIZED HEALTH CARE EDUCATION/TRAINING PROGRAM

## 2023-10-09 PROCEDURE — 87635 SARS-COV-2 COVID-19 AMP PRB: CPT | Mod: ZL | Performed by: STUDENT IN AN ORGANIZED HEALTH CARE EDUCATION/TRAINING PROGRAM

## 2023-10-09 PROCEDURE — G0463 HOSPITAL OUTPT CLINIC VISIT: HCPCS

## 2023-10-09 RX ORDER — ACETAMINOPHEN 325 MG/1
TABLET, COATED ORAL
COMMUNITY
Start: 2023-08-24

## 2023-10-09 ASSESSMENT — PAIN SCALES - GENERAL: PAINLEVEL: SEVERE PAIN (6)

## 2023-10-09 NOTE — NURSING NOTE
Patient presents to clinic for concern of pain in both ears, cough and sinus pain  /64   Pulse 75   Temp 97.8  F (36.6  C) (Tympanic)   Resp 14   Wt 80.2 kg (176 lb 14.4 oz)   LMP  (LMP Unknown)   SpO2 94%   BMI 29.44 kg/m    Laurence Rodríguez LPN on 10/9/2023 at 6:06 PM

## 2023-10-09 NOTE — LETTER
October 9, 2023      Alejandra Villegas  [address]  [place] [id number]        To Whom It May Concern:    Alejandra Villegas  was seen on 10/9/23.  Please excuse her from work on 10/8/23.        Sincerely,        Yarelis Ruiz PA-C

## 2023-10-10 NOTE — PROGRESS NOTES
Assessment & Plan     (J06.9) Upper respiratory tract infection, unspecified type  (primary encounter diagnosis)    Comment: Upper respiratory infection, she has had multiple symptoms over the past 2 days.  COVID test was negative today.  Her vital signs are stable during the office visit including being afebrile, not tachycardic.  Her oxygen is 94% today, she is a chronic smoker and it does appear that in her history she usually runs between 90 and 95%.  No evidence of secondary otitis media at this time, probable that there is effusion bilaterally in her ears.    Plan: Symptomatic COVID-19 Virus (Coronavirus) by PCR        Nose         Continue over-the-counter symptom management at this time.  Discussed return to work when feeling able and not having fevers.  Continue to monitor the ear symptoms and if the pain does persist or worsen, recommended that she would have her ears rechecked.  If other symptoms worsen or change, return to rapid clinic or ER for further evaluation and management.  She is comfortable and agreeable with this plan.    Yarelis Ruiz PA-C  [facility]   Alejandra is a 57 year old, presenting for the following health issues:  Ear Problem, Cough, and Sinus Problem      HPI     Patient presents today with a 2-day history of ear pain, sinus congestion, cough, fever, and mild sore throat.  She states the symptoms have stayed about the same though the ear pain does seem to be worsening.  She does use over-the-counter medications for her symptoms including Tylenol.  She has tried to drink plenty of fluids but has had a decreased appetite.      She does smoke.  She did not take any at home COVID test.      Review of Systems   Patient endorses fevers, chills, sweats  Patient endorses sore throat, ear pain bilaterally  Patient endorses productive cough, denies chest pain or shortness of breath  Patient denies nausea, vomiting, diarrhea            Objective    BP  110/64   Pulse 75   Temp 97.8  F (36.6  C) (Tympanic)   Resp 14   Wt 80.2 kg (176 lb 14.4 oz)   LMP  (LMP Unknown)   SpO2 94%   BMI 29.44 kg/m    Body mass index is 29.44 kg/m .    Physical Exam   GENERAL: healthy, alert and no distress  EYES: Eyes grossly normal to inspection, PERRL and conjunctivae and sclerae normal  HENT: ear canals without erythema, cerumen, tympanic membranes with scarring without bulging, erythema, or suppurative fluid, nose and mouth without ulcers or lesions  NECK: no adenopathy, no asymmetry, masses, or scars and thyroid normal to palpation  RESP: lungs clear to auscultation - no rales, rhonchi or wheezes  CV: regular rate and rhythm, normal S1 S2  MS: no gross musculoskeletal defects noted, no edema      Results for orders placed or performed in visit on 10/09/23   Symptomatic COVID-19 Virus (Coronavirus) by PCR Nose     Status: Normal    Specimen: Nose; Swab   Result Value Ref Range    SARS CoV2 PCR Negative Negative    Narrative    Testing was performed using the Xpert Xpress SARS-CoV-2 Assay on the Cepheid Gene-Xpert Instrument Systems. Additional information about this Emergency Use Authorization (EUA) assay can be found via the Lab Guide. This test should be ordered for the detection of SARS-CoV-2 in individuals who meet SARS-CoV-2 clinical and/or epidemiological criteria as well as from individuals without symptoms or other reasons to suspect COVID-19. Test performance for asymptomatic patients has only been established in anterior nasal swab specimens. This test is for in vitro diagnostic use under the FDA EUA for laboratories certified under CLIA to perform high complexity testing. This test has not been FDA cleared or approved. A negative result does not rule out the presence of PCR inhibitors in the specimen or target RNA concentration below the limit of detection for the assay. The possibility of a false negative should be considered if the patient's recent exposure or  clinical presentation suggests COVID-19. This test was validated by [facility]. This laboratory is certified under the Clinical Laboratory Improvement Amendments (CLIA) as qualified to perform high complexity clinical laboratory testing.

## 2023-12-07 NOTE — TELEPHONE ENCOUNTER
Chart review shows that LOV with PCP was on 12/3/18 for a preop. Rx as requested is noted in office visit notes on that date with no changes and no specific follow up timeline is noted as well. Writer will refill Rx as requested for a 6 month supply.    Prescription refilled per RN Medication Refill Policy..................Armando Gunn 5/14/2019 3:07 PM     not applicable

## 2024-01-23 ENCOUNTER — HOSPITAL ENCOUNTER (OUTPATIENT)
Dept: GENERAL RADIOLOGY | Facility: OTHER | Age: 59
Discharge: HOME | End: 2024-01-23
Attending: INTERNAL MEDICINE
Payer: MEDICARE

## 2024-01-23 ENCOUNTER — OFFICE VISIT (OUTPATIENT)
Dept: INTERNAL MEDICINE | Facility: OTHER | Age: 59
End: 2024-01-23
Attending: NURSE PRACTITIONER
Payer: MEDICARE

## 2024-01-23 ENCOUNTER — PATIENT OUTREACH (OUTPATIENT)
Dept: CARE COORDINATION | Facility: CLINIC | Age: 59
End: 2024-01-23
Payer: MEDICARE

## 2024-01-23 VITALS
WEIGHT: 176.7 LBS | HEIGHT: 65 IN | SYSTOLIC BLOOD PRESSURE: 112 MMHG | RESPIRATION RATE: 20 BRPM | TEMPERATURE: 98.1 F | OXYGEN SATURATION: 96 % | HEART RATE: 81 BPM | DIASTOLIC BLOOD PRESSURE: 74 MMHG | BODY MASS INDEX: 29.44 KG/M2

## 2024-01-23 DIAGNOSIS — M06.9 RHEUMATOID ARTHRITIS INVOLVING MULTIPLE SITES, UNSPECIFIED WHETHER RHEUMATOID FACTOR PRESENT (H): ICD-10-CM

## 2024-01-23 DIAGNOSIS — J44.1 COPD EXACERBATION (H): ICD-10-CM

## 2024-01-23 DIAGNOSIS — M51.379 DEGENERATIVE DISC DISEASE AT L5-S1 LEVEL: ICD-10-CM

## 2024-01-23 DIAGNOSIS — J43.9 PULMONARY EMPHYSEMA, UNSPECIFIED EMPHYSEMA TYPE (H): ICD-10-CM

## 2024-01-23 DIAGNOSIS — F17.200 TOBACCO USE DISORDER: ICD-10-CM

## 2024-01-23 DIAGNOSIS — F41.9 CHRONIC ANXIETY: ICD-10-CM

## 2024-01-23 DIAGNOSIS — I10 BENIGN ESSENTIAL HYPERTENSION: ICD-10-CM

## 2024-01-23 DIAGNOSIS — N18.2 CKD (CHRONIC KIDNEY DISEASE) STAGE 2, GFR 60-89 ML/MIN: ICD-10-CM

## 2024-01-23 DIAGNOSIS — J44.1 COPD EXACERBATION (H): Primary | ICD-10-CM

## 2024-01-23 DIAGNOSIS — E78.2 MIXED HYPERLIPIDEMIA: ICD-10-CM

## 2024-01-23 PROCEDURE — 71046 X-RAY EXAM CHEST 2 VIEWS: CPT

## 2024-01-23 PROCEDURE — G0463 HOSPITAL OUTPT CLINIC VISIT: HCPCS

## 2024-01-23 PROCEDURE — 99214 OFFICE O/P EST MOD 30 MIN: CPT | Performed by: INTERNAL MEDICINE

## 2024-01-23 PROCEDURE — G0463 HOSPITAL OUTPT CLINIC VISIT: HCPCS | Mod: 25

## 2024-01-23 RX ORDER — ALBUTEROL SULFATE 90 UG/1
1-2 AEROSOL, METERED RESPIRATORY (INHALATION) EVERY 4 HOURS PRN
Qty: 18 G | Refills: 4 | Status: SHIPPED | OUTPATIENT
Start: 2024-01-23

## 2024-01-23 RX ORDER — PAROXETINE 20 MG/1
20 TABLET, FILM COATED ORAL EVERY MORNING
Qty: 90 TABLET | Refills: 4 | Status: SHIPPED | OUTPATIENT
Start: 2024-01-23

## 2024-01-23 RX ORDER — LISINOPRIL 10 MG/1
10 TABLET ORAL DAILY
Qty: 90 TABLET | Refills: 4 | Status: SHIPPED | OUTPATIENT
Start: 2024-01-23

## 2024-01-23 RX ORDER — AZITHROMYCIN 250 MG/1
TABLET, FILM COATED ORAL
Qty: 6 TABLET | Refills: 0 | Status: SHIPPED | OUTPATIENT
Start: 2024-01-23 | End: 2024-01-28

## 2024-01-23 RX ORDER — IPRATROPIUM BROMIDE AND ALBUTEROL SULFATE 2.5; .5 MG/3ML; MG/3ML
1 SOLUTION RESPIRATORY (INHALATION) EVERY 4 HOURS PRN
Qty: 90 ML | Refills: 11 | Status: SHIPPED | OUTPATIENT
Start: 2024-01-23

## 2024-01-23 RX ORDER — BUDESONIDE AND FORMOTEROL FUMARATE DIHYDRATE 80; 4.5 UG/1; UG/1
2 AEROSOL RESPIRATORY (INHALATION) 2 TIMES DAILY
Qty: 10.2 G | Refills: 11 | Status: SHIPPED | OUTPATIENT
Start: 2024-01-23

## 2024-01-23 RX ORDER — GABAPENTIN 400 MG/1
800 CAPSULE ORAL 2 TIMES DAILY
Qty: 360 CAPSULE | Refills: 4 | Status: SHIPPED | OUTPATIENT
Start: 2024-01-23

## 2024-01-23 RX ORDER — ATORVASTATIN CALCIUM 40 MG/1
40 TABLET, FILM COATED ORAL AT BEDTIME
Qty: 90 TABLET | Refills: 4 | Status: SHIPPED | OUTPATIENT
Start: 2024-01-23

## 2024-01-23 RX ORDER — METHYLPREDNISOLONE 4 MG
TABLET, DOSE PACK ORAL
Qty: 21 TABLET | Refills: 0 | Status: SHIPPED | OUTPATIENT
Start: 2024-01-23 | End: 2024-01-29

## 2024-01-23 ASSESSMENT — ENCOUNTER SYMPTOMS
ARTHRALGIAS: 1
ABDOMINAL PAIN: 0
CHILLS: 1
SHORTNESS OF BREATH: 1
FEVER: 0
WHEEZING: 1
FATIGUE: 1
WOUND: 0
COUGH: 1
HEADACHES: 1
HEMATURIA: 0
BRUISES/BLEEDS EASILY: 0

## 2024-01-23 ASSESSMENT — ASTHMA QUESTIONNAIRES
QUESTION_5 LAST FOUR WEEKS HOW WOULD YOU RATE YOUR ASTHMA CONTROL: SOMEWHAT CONTROLLED
QUESTION_4 LAST FOUR WEEKS HOW OFTEN HAVE YOU USED YOUR RESCUE INHALER OR NEBULIZER MEDICATION (SUCH AS ALBUTEROL): NOT AT ALL
QUESTION_1 LAST FOUR WEEKS HOW MUCH OF THE TIME DID YOUR ASTHMA KEEP YOU FROM GETTING AS MUCH DONE AT WORK, SCHOOL OR AT HOME: A LITTLE OF THE TIME
ACT_TOTALSCORE: 17
QUESTION_2 LAST FOUR WEEKS HOW OFTEN HAVE YOU HAD SHORTNESS OF BREATH: MORE THAN ONCE A DAY
QUESTION_3 LAST FOUR WEEKS HOW OFTEN DID YOUR ASTHMA SYMPTOMS (WHEEZING, COUGHING, SHORTNESS OF BREATH, CHEST TIGHTNESS OR PAIN) WAKE YOU UP AT NIGHT OR EARLIER THAN USUAL IN THE MORNING: ONCE OR TWICE

## 2024-01-23 ASSESSMENT — PAIN SCALES - GENERAL: PAINLEVEL: EXTREME PAIN (9)

## 2024-01-23 NOTE — COMMUNITY RESOURCES LIST (ENGLISH)
01/23/2024    "[employer] The Simple, Fast and Elegant Tablet Sales App"view [employer]  N/A  For questions about this resource list or additional care needs, please contact your primary care clinic or care manager.  Phone: [phone]   Email: N/A   Address: [address] [place] [id number]   Hours: N/A        Financial Stability       Utility payment assistance  1  Reissued  Main Office - Energy Assistance Program Distance: 0.83 miles      Phone/Virtual   [address] [facility] [address] [place] [address]  Language: English  Hours: Mon - Thu 8:00 AM - 4:30 PM , Fri 8:00 AM - 12:00 PM  Fees: Free   Phone: (299) 185-4376 Email: [email or web address]@[email or web address] Website: [email or web address]          Important Numbers & Websites       Emergency Services   911  City Services   311  Poison Control   (706) 356-3077  Suicide Prevention Lifeline   (734) 600-2720 (TALK)  Child Abuse Hotline   (646) 331-9765 (4-A-Child)  Sexual Assault Hotline   (122) 759-5551 (HOPE)  National Runaway Safeline   (546) 221-8796 (RUNAWAY)  All-Options Talkline   (693) 251-6337  Substance Abuse Referral   (189) 696-1511 (HELP)

## 2024-01-23 NOTE — PATIENT INSTRUCTIONS
COPD exacerbation (H)    Rx completed:  - Nebulizer and Supplies Order for DME - ONLY FOR DME    START:   - azithromycin (ZITHROMAX) 250 MG tablet; Take 2 tablets (500 mg) by mouth daily for 1 day, THEN 1 tablet (250 mg) daily for 4 days.  - methylPREDNISolone (MEDROL DOSEPAK) 4 MG tablet therapy pack; Take 2 tablets (8 mg) by mouth daily with food for 3 days, THEN 1 tablet (4 mg) daily with food for 3 days.    START:   - albuterol (PROAIR HFA/PROVENTIL HFA/VENTOLIN HFA) 108 (90 Base) MCG/ACT inhaler; Inhale 1-2 puffs into the lungs every 4 hours as needed for shortness of breath, wheezing or cough    START:   - ipratropium - albuterol 0.5 mg/2.5 mg/3 mL (DUONEB) 0.5-2.5 (3) MG/3ML neb solution; Take 1 vial (3 mLs) by nebulization every 4 hours as needed for shortness of breath, wheezing or cough    START:   - budesonide-formoterol (SYMBICORT) 80-4.5 MCG/ACT Inhaler; Inhale 2 puffs into the lungs 2 times daily - Rinse mouth well after use to prevent Thrush    - XR Chest 2 Views; Future -- chest xray today.     Benign essential hypertension  - lisinopril (ZESTRIL) 10 MG tablet; Take 1 tablet (10 mg) by mouth daily    Tobacco use disorder  - XR Chest 2 Views; Future      QUIT SMOKING!!     -- Choose a quit date (within 1 month). Quitting smoking abruptly is more successful than gradually cutting back.   -- Tell everyone about it (friends, family, coworkers)   -- Think about when you smoke the most, and what you'll do during those times (eg when in the car, work breaks, etc)     Consider:   -- Start varenicline (Chantix) 1 week before your quit date   -- Start bupropion (Wellbutrin/Zyban) 1 week before your quit date -- Welbutrin 1 pill daily for 1 week then 1 pill twice daily      -- Stop smoking on quit date   -- Starting with quit date, use nicotine lozenges/gum as needed for cravings     -- Quit Plan - Call them in the next 1-2 weeks to help you quit smoking.                        [phone] ([phone])                         http://www.[employer].HashTip   -- http://smokefree.gov/     Pulmonary emphysema, unspecified emphysema type (H)  - Nebulizer and Supplies Order for DME - ONLY FOR DME    START:   - albuterol (PROAIR HFA/PROVENTIL HFA/VENTOLIN HFA) 108 (90 Base) MCG/ACT inhaler; Inhale 1-2 puffs into the lungs every 4 hours as needed for shortness of breath, wheezing or cough  - ipratropium - albuterol 0.5 mg/2.5 mg/3 mL (DUONEB) 0.5-2.5 (3) MG/3ML neb solution; Take 1 vial (3 mLs) by nebulization every 4 hours as needed for shortness of breath, wheezing or cough  - budesonide-formoterol (SYMBICORT) 80-4.5 MCG/ACT Inhaler; Inhale 2 puffs into the lungs 2 times daily - Rinse mouth well after use to prevent Thrush    - XR Chest 2 Views; Future    Chronic anxiety    Continue:   - PARoxetine (PAXIL) 20 MG tablet; Take 1 tablet (20 mg) by mouth every morning    Mixed hyperlipidemia  - atorvastatin (LIPITOR) 40 MG tablet; Take 1 tablet (40 mg) by mouth at bedtime    CKD (chronic kidney disease) stage 2, GFR 60-89 ml/min    Rheumatoid arthritis involving multiple sites, unspecified whether rheumatoid factor present (H)    Degenerative disc disease at L5-S1 level    Continue:   - gabapentin (NEURONTIN) 400 MG capsule; Take 2 capsules (800 mg) by mouth 2 times daily      Return as needed for follow-up for new / worsening symptoms.    Clinic : [phone]  Appointment line: [phone]

## 2024-01-23 NOTE — PROGRESS NOTES
Assessment & Plan     ICD-10-CM    1. COPD exacerbation (H)  J44.1 Nebulizer and Supplies Order for DME - ONLY FOR DME     azithromycin (ZITHROMAX) 250 MG tablet     methylPREDNISolone (MEDROL DOSEPAK) 4 MG tablet therapy pack     albuterol (PROAIR HFA/PROVENTIL HFA/VENTOLIN HFA) 108 (90 Base) MCG/ACT inhaler     ipratropium - albuterol 0.5 mg/2.5 mg/3 mL (DUONEB) 0.5-2.5 (3) MG/3ML neb solution     budesonide-formoterol (SYMBICORT) 80-4.5 MCG/ACT Inhaler     XR Chest 2 Views      2. Benign essential hypertension  I10 lisinopril (ZESTRIL) 10 MG tablet      3. Tobacco use disorder  F17.200 XR Chest 2 Views      4. Pulmonary emphysema, unspecified emphysema type (H)  J43.9 Nebulizer and Supplies Order for DME - ONLY FOR DME     albuterol (PROAIR HFA/PROVENTIL HFA/VENTOLIN HFA) 108 (90 Base) MCG/ACT inhaler     ipratropium - albuterol 0.5 mg/2.5 mg/3 mL (DUONEB) 0.5-2.5 (3) MG/3ML neb solution     budesonide-formoterol (SYMBICORT) 80-4.5 MCG/ACT Inhaler     XR Chest 2 Views      5. Chronic anxiety  F41.9 PARoxetine (PAXIL) 20 MG tablet      6. Mixed hyperlipidemia  E78.2 atorvastatin (LIPITOR) 40 MG tablet      7. CKD (chronic kidney disease) stage 2, GFR 60-89 ml/min  N18.2       8. Rheumatoid arthritis involving multiple sites, unspecified whether rheumatoid factor present (H)  M06.9       9. Degenerative disc disease at L5-S1 level  M51.37 gabapentin (NEURONTIN) 400 MG capsule        Patient presents for multiple issues.    Ongoing tobacco use, she is not ready to quit smoking at this time.  She has unspecified emphysema based on previous lung imaging.  Given her acute onset of respiratory symptoms, initiate treatment for COPD exacerbation.  Chest x-ray today without obvious pneumonia or infiltrate.  Results noted below.  Start Medrol Dosepak, Zithromax, albuterol inhaler, DuoNeb, Symbicort.    Tobacco use, ongoing.  Encourage smoking cessation.  Is not ready to quit at this time.    Chronic anxiety, states that she  ran out of her paroxetine and needs refills today.    Rheumatoid arthritis with multiple joint pain, using ibuprofen and gabapentin for pain as needed.    Degenerative lumbar disc disease, ongoing, chronic.  Taking gabapentin and ibuprofen as indicated.  Needs refills.    HYPERTENSION - Ongoing. Blood pressure is currently well controlled.  Medication side effects: None. Denies syncope or presyncope.  No changes for now. Continue current medications.   Medication list reviewed/updated. Refills completed as needed.      MIXED HYPERLIPIDEMIA.  Ongoing. LDL is at goal: No. Triglycerides are at goal: No.  Hopefully lifestyle modifications will improve cholesterol levels, otherwise we will need to consider additional medication dose adjustments or medication changes.  Medication side effects reported: None.   Continue current medications for now. Medication list reviewed/updated. Refills completed as needed.  Recent Labs   Lab Test 03/30/22  0917 03/24/20  1614 12/03/18  1609   CHOL 261* 167 149   HDL 35 48 36   LDL  --  89 77   TRIG 481* 149 178*        Emphysema / COPD - Patient has a longstanding history of COPD: Mild = FeV1 > 80%. Patient has been doing worse recently - with exacerbation - cough, wheezing, phlegm, shortness of breath.  Breathing issues worsened for past week or so.   - ran out of inhalers/nebs.     Chronic Kidney Disease, Stage 2 (GFR 60-89), chronic, ongoing.  Kidney function had been slowly declining.  Encouraged NSAID avoidance.        Return for follow-up as needed for new or worsening symptoms, Appointments: [phone].    Darin Morin MD  [facility]       Results for orders placed or performed during the hospital encounter of 01/23/24   XR Chest 2 Views     Status: None    Narrative    PROCEDURE:  XR CHEST 2 VIEWS    HISTORY: Tobacco use disorder; COPD exacerbation (H); Pulmonary  emphysema, unspecified emphysema type (H)    COMPARISON:  Radiographs 11/3/2020,  12/8/2019    FINDINGS: PA and lateral chest radiographs    Cardiomediastinal silhouette is within normal limits. There is  calcific aortic atherosclerosis.  No focal consolidation, effusion or pneumothorax.    No suspicious osseous lesion or subdiaphragmatic free air.      Impression    IMPRESSION:    No acute cardiopulmonary process.    SHANNON RIVERA MD         SYSTEM ID:  [id number]        Seda Roberts is a 58 year old, presenting for the following health issues:  Bilateral ear pain, crackling, Productive, wheezing        1/23/2024     3:39 PM   Additional Questions   Roomed by Jonathan Pate LPN   Accompanied by n/a     History of Present Illness       Reason for visit:  Refill paxil, ear check,  cough, wheezing  Symptom onset:  3-7 days ago  Symptoms include:  Productive cough, ear pain-  crackling, wheezing  Symptom intensity:  Mild  Symptom progression:  Worsening  Had these symptoms before:  Yes  What makes it worse:  No electricty no heat in trailer house  What makes it better:  Acetaminepine    She eats 2-3 servings of fruits and vegetables daily.She consumes 6 sweetened beverage(s) daily.She exercises with enough effort to increase her heart rate 9 or less minutes per day.  She exercises with enough effort to increase her heart rate 3 or less days per week.   She is taking medications regularly.         Acute Illness  Acute illness concerns: earcheck, cough, wheezing  Onset/Duration: 5 days ago  Symptoms:  Fever: No  Chills/Sweats: YES  Headache (location?): YES  Sinus Pressure: No  Conjunctivitis:  No  Ear Pain: YES: bilateral  Rhinorrhea: No  Congestion: YES  Sore Throat: No  Cough: YES-productive of yellow sputum, with shortness of breath, worsening over time  Wheeze: YES  Decreased Appetite: No  Nausea: No  Vomiting: No  Diarrhea: No  Dysuria/Freq.: No  Dysuria or Hematuria: No  Fatigue/Achiness: YES  Sick/Strep Exposure: No  Therapies tried and outcome: None    Review of Systems   Constitutional:   "Positive for chills and fatigue. Negative for fever.   HENT:  Negative for congestion.    Respiratory:  Positive for cough, shortness of breath and wheezing.    Cardiovascular:  Negative for chest pain.   Gastrointestinal:  Negative for abdominal pain.   Genitourinary:  Negative for hematuria.   Musculoskeletal:  Positive for arthralgias.   Skin:  Negative for wound.   Allergic/Immunologic: Negative for immunocompromised state.   Neurological:  Positive for headaches.   Hematological:  Does not bruise/bleed easily.            Objective    /74 (BP Location: Right arm, Patient Position: Sitting, Cuff Size: Adult Regular)   Pulse 81   Temp 98.1  F (36.7  C) (Temporal)   Resp 20   Ht 1.657 m (5' 5.25\")   Wt 80.2 kg (176 lb 11.2 oz)   LMP 01/01/2007 (Approximate)   SpO2 96%   Breastfeeding No   BMI 29.18 kg/m    Body mass index is 29.18 kg/m .  Physical Exam  Constitutional:       Appearance: She is ill-appearing.   Eyes:      General: No scleral icterus.     Conjunctiva/sclera: Conjunctivae normal.   Cardiovascular:      Rate and Rhythm: Normal rate and regular rhythm.      Pulses: Normal pulses.   Pulmonary:      Breath sounds: Wheezing and rales present.   Abdominal:      Palpations: Abdomen is soft.      Tenderness: There is no abdominal tenderness.   Musculoskeletal:      Right lower leg: No edema.      Left lower leg: No edema.   Skin:     General: Skin is warm and dry.      Findings: No rash.   Neurological:      Mental Status: She is alert. Mental status is at baseline.   Psychiatric:         Mood and Affect: Mood normal.         Behavior: Behavior normal.                Signed Electronically by: Darin Morin MD    "

## 2024-01-23 NOTE — NURSING NOTE
"Chief Complaint   Patient presents with    Bilateral ear pain, crackling, Productive, wheezing       Initial /74 (BP Location: Right arm, Patient Position: Sitting, Cuff Size: Adult Regular)   Pulse 81   Temp 98.1  F (36.7  C) (Temporal)   Resp 20   Ht 1.657 m (5' 5.25\")   Wt 80.2 kg (176 lb 11.2 oz)   LMP 01/01/2007 (Approximate)   SpO2 96%   Breastfeeding No   BMI 29.18 kg/m   Estimated body mass index is 29.18 kg/m  as calculated from the following:    Height as of this encounter: 1.657 m (5' 5.25\").    Weight as of this encounter: 80.2 kg (176 lb 11.2 oz).  Medication Review: complete    The next two questions are to help us understand your food security.  If you are feeling you need any assistance in this area, we have resources available to support you today.          1/23/2024   SDOH- Food Insecurity   Within the past 12 months, did you worry that your food would run out before you got money to buy more? N   Within the past 12 months, did the food you bought just not last and you didn t have money to get more? N         Health Care Directive:  Patient does not have a Health Care Directive or Living Will: Discussed advance care planning with patient; however, patient declined at this time.    Jonathan Hart      "

## 2024-01-24 DIAGNOSIS — M79.671 RIGHT FOOT PAIN: Primary | ICD-10-CM

## 2024-02-08 ENCOUNTER — OFFICE VISIT (OUTPATIENT)
Dept: ORTHOPEDICS | Facility: OTHER | Age: 59
End: 2024-02-08
Attending: PODIATRIST
Payer: MEDICARE

## 2024-02-08 ENCOUNTER — HOSPITAL ENCOUNTER (OUTPATIENT)
Dept: GENERAL RADIOLOGY | Facility: OTHER | Age: 59
Discharge: HOME | End: 2024-02-08
Attending: PODIATRIST
Payer: MEDICARE

## 2024-02-08 DIAGNOSIS — M79.671 RIGHT FOOT PAIN: ICD-10-CM

## 2024-02-08 DIAGNOSIS — M79.671 RIGHT FOOT PAIN: Primary | ICD-10-CM

## 2024-02-08 PROCEDURE — 250N000009 HC RX 250: Performed by: PODIATRIST

## 2024-02-08 PROCEDURE — 250N000011 HC RX IP 250 OP 636: Performed by: PODIATRIST

## 2024-02-08 PROCEDURE — 64455 NJX AA&/STRD PLTR COM DG NRV: CPT | Mod: RT | Performed by: PODIATRIST

## 2024-02-08 PROCEDURE — 99213 OFFICE O/P EST LOW 20 MIN: CPT | Mod: 25 | Performed by: PODIATRIST

## 2024-02-08 PROCEDURE — 73630 X-RAY EXAM OF FOOT: CPT | Mod: RT

## 2024-02-08 PROCEDURE — G0463 HOSPITAL OUTPT CLINIC VISIT: HCPCS | Mod: 25

## 2024-02-08 RX ORDER — TRIAMCINOLONE ACETONIDE 40 MG/ML
20 INJECTION, SUSPENSION INTRA-ARTICULAR; INTRAMUSCULAR ONCE
Status: COMPLETED | OUTPATIENT
Start: 2024-02-08 | End: 2024-02-08

## 2024-02-08 RX ORDER — LIDOCAINE HYDROCHLORIDE 10 MG/ML
1 INJECTION, SOLUTION EPIDURAL; INFILTRATION; INTRACAUDAL; PERINEURAL ONCE
Status: COMPLETED | OUTPATIENT
Start: 2024-02-08 | End: 2024-02-08

## 2024-02-08 RX ADMIN — LIDOCAINE HYDROCHLORIDE 1 ML: 10 INJECTION, SOLUTION EPIDURAL; INFILTRATION; INTRACAUDAL; PERINEURAL at 14:26

## 2024-02-08 RX ADMIN — TRIAMCINOLONE ACETONIDE 20 MG: 40 INJECTION, SUSPENSION INTRA-ARTICULAR; INTRAMUSCULAR at 14:25

## 2024-02-08 NOTE — PROGRESS NOTES
SUBJECTIVE:  Patient is known to me.  She has a history of first MPJ fusion subsequent hardware removal hardware removal made things better quite significantly.  She has pain lateral to this gets sharp shooting pain.    ROS: Musculoskeletal and general review of systems are negative, per review of previous clinic questionnaire.  Denies SOB and calf pain.    EXAM: Right foot evaluated her fusion is well-positioned and stable.  She has pain to her third and metatarsal space palpation here metatarsal squeeze recreates her symptoms.  This is consistent with a neuroma.    IMAGING: X-rays taken today are negative well aligned well healed MTPJ fusion    ASSESSMENT: Right foot mortons neuroma    PLAN: Discussed condition and treatment.  We discussed etiology of this.  We discussed appropriate shoe gear with a wider toe box and appropriate support.  We did injection in addition to office visit with ongoing pain she will reappoint.      Procedure: Patient's right dorsal forefoot prepped with alcohol injected the interval between the second and third metatarsal heads to the plantar aspect of the foot on the proper plantar digital nerve with 20 mg of Kenalog and 1 mL of 1% lidocaine plain.  Band-Aid applied.    Rl Nathan DPM

## 2024-05-09 ENCOUNTER — PATIENT OUTREACH (OUTPATIENT)
Dept: CARE COORDINATION | Facility: CLINIC | Age: 59
End: 2024-05-09
Payer: MEDICARE

## 2024-05-16 NOTE — PROGRESS NOTES
Clinic Care Coordination Contact  [facility]/Voicemail    Left message on patient's voicemail with call back information and requested return call.    Plan: Care Coordinator will wait for return call.   Consuelo Quiroz RN on 5/16/2024 at 4:18 PM

## 2024-09-19 ENCOUNTER — HOSPITAL ENCOUNTER (EMERGENCY)
Facility: OTHER | Age: 59
Discharge: HOME | End: 2024-09-19

## 2024-09-19 VITALS
WEIGHT: 165 LBS | DIASTOLIC BLOOD PRESSURE: 77 MMHG | HEART RATE: 82 BPM | SYSTOLIC BLOOD PRESSURE: 120 MMHG | HEIGHT: 67 IN | RESPIRATION RATE: 18 BRPM | OXYGEN SATURATION: 96 % | BODY MASS INDEX: 25.9 KG/M2 | TEMPERATURE: 99.8 F

## 2024-09-19 ASSESSMENT — COLUMBIA-SUICIDE SEVERITY RATING SCALE - C-SSRS
1. IN THE PAST MONTH, HAVE YOU WISHED YOU WERE DEAD OR WISHED YOU COULD GO TO SLEEP AND NOT WAKE UP?: NO
6. HAVE YOU EVER DONE ANYTHING, STARTED TO DO ANYTHING, OR PREPARED TO DO ANYTHING TO END YOUR LIFE?: NO
2. HAVE YOU ACTUALLY HAD ANY THOUGHTS OF KILLING YOURSELF IN THE PAST MONTH?: NO

## 2024-09-20 ENCOUNTER — OFFICE VISIT (OUTPATIENT)
Dept: FAMILY MEDICINE | Facility: OTHER | Age: 59
End: 2024-09-20
Attending: STUDENT IN AN ORGANIZED HEALTH CARE EDUCATION/TRAINING PROGRAM

## 2024-09-20 VITALS
OXYGEN SATURATION: 90 % | TEMPERATURE: 98 F | SYSTOLIC BLOOD PRESSURE: 122 MMHG | DIASTOLIC BLOOD PRESSURE: 80 MMHG | RESPIRATION RATE: 19 BRPM | HEART RATE: 76 BPM | BODY MASS INDEX: 28.27 KG/M2 | WEIGHT: 180.5 LBS

## 2024-09-20 DIAGNOSIS — N30.00 ACUTE CYSTITIS WITHOUT HEMATURIA: Primary | ICD-10-CM

## 2024-09-20 DIAGNOSIS — R39.9 UTI SYMPTOMS: ICD-10-CM

## 2024-09-20 LAB
ALBUMIN UR-MCNC: 10 MG/DL
APPEARANCE UR: ABNORMAL
BACTERIA #/AREA URNS HPF: ABNORMAL /HPF
BILIRUB UR QL STRIP: NEGATIVE
COLOR UR AUTO: ABNORMAL
GLUCOSE UR STRIP-MCNC: NEGATIVE MG/DL
HGB UR QL STRIP: ABNORMAL
KETONES UR STRIP-MCNC: NEGATIVE MG/DL
LEUKOCYTE ESTERASE UR QL STRIP: ABNORMAL
NITRATE UR QL: POSITIVE
PH UR STRIP: 7 [PH] (ref 5–9)
RBC URINE: 6 /HPF
SP GR UR STRIP: 1.02 (ref 1–1.03)
SQUAMOUS EPITHELIAL: 1 /HPF
UROBILINOGEN UR STRIP-MCNC: NORMAL MG/DL
WBC CLUMPS #/AREA URNS HPF: PRESENT /HPF
WBC URINE: 128 /HPF

## 2024-09-20 PROCEDURE — 81001 URINALYSIS AUTO W/SCOPE: CPT | Mod: ZL | Performed by: PHYSICIAN ASSISTANT

## 2024-09-20 PROCEDURE — 87186 SC STD MICRODIL/AGAR DIL: CPT | Mod: ZL | Performed by: PHYSICIAN ASSISTANT

## 2024-09-20 PROCEDURE — 99213 OFFICE O/P EST LOW 20 MIN: CPT | Performed by: PHYSICIAN ASSISTANT

## 2024-09-20 PROCEDURE — 87086 URINE CULTURE/COLONY COUNT: CPT | Mod: ZL | Performed by: PHYSICIAN ASSISTANT

## 2024-09-20 RX ORDER — CEFDINIR 300 MG/1
300 CAPSULE ORAL 2 TIMES DAILY
Qty: 10 CAPSULE | Refills: 0 | Status: SHIPPED | OUTPATIENT
Start: 2024-09-20 | End: 2024-09-25

## 2024-09-20 ASSESSMENT — PAIN SCALES - GENERAL: PAINLEVEL: EXTREME PAIN (9)

## 2024-09-20 NOTE — ED TRIAGE NOTES
"ED Nursing Triage Note (General)   ________________________________    Alejandra Villegas is a 58 year old Female that presents to triage private car  with history of  left side flank pain going down to the left leg reported by patient . Symptoms started 3 days ago. Interventions prior to arrival include tyleonl around 2000 .  /77   Pulse 82   Temp 99.8  F (37.7  C) (Tympanic)   Resp 18   Ht 1.702 m (5' 7\")   Wt 74.8 kg (165 lb)   LMP 01/01/2007 (Approximate)   SpO2 96%   BMI 25.84 kg/m  t  Patient appears alert  and oriented    GCS Total = 15    Action taken:  waiting room      PRE HOSPITAL PRIOR LIVING SITUATION Alone       Triage Assessment (Adult)       Row Name 09/19/24 2025          Triage Assessment    Airway WDL WDL        Respiratory WDL    Respiratory WDL WDL        Skin Circulation/Temperature WDL    Skin Circulation/Temperature WDL WDL        Cardiac WDL    Cardiac WDL WDL        Peripheral/Neurovascular WDL    Peripheral Neurovascular WDL WDL        Cognitive/Neuro/Behavioral WDL    Cognitive/Neuro/Behavioral WDL WDL                     " [facility] refill request for:      fentaNYL (DURAGESIC) 75 MCG/HR patch 10 each 0 8/5/2019     Sig - Route: Place 1 patch onto the skin every 72 hours. - Transdermal    Sent to pharmacy as: fentaNYL 75 MCG/HR Transdermal Patch 72 Hour    Class: [facility]        [facility]- 08/26/2019  Route to provider for review.

## 2024-09-20 NOTE — NURSING NOTE
"Chief Complaint   Patient presents with    Back Pain     Lower left back pain    UTI     Frequency and pain with urination     Patient here for lower left back pain with radiation to left leg and frequency and pain with urination x3 days. Has been treating with tylenol.     Initial /80   Pulse 76   Temp 98  F (36.7  C) (Tympanic)   Resp 19   Wt 81.9 kg (180 lb 8 oz)   LMP 01/01/2007 (Approximate)   SpO2 90%   BMI 28.27 kg/m   Estimated body mass index is 28.27 kg/m  as calculated from the following:    Height as of 9/19/24: 1.702 m (5' 7\").    Weight as of this encounter: 81.9 kg (180 lb 8 oz).  Medication Review: complete    The next two questions are to help us understand your food security.  If you are feeling you need any assistance in this area, we have resources available to support you today.          9/20/2024   SDOH- Food Insecurity   Within the past 12 months, did you worry that your food would run out before you got money to buy more? N   Within the past 12 months, did the food you bought just not last and you didn t have money to get more? N            Health Care Directive:  Patient does not have a Health Care Directive or Living Will: Discussed advance care planning with patient; however, patient declined at this time.    Sallie Philip LPN      "

## 2024-09-20 NOTE — PROGRESS NOTES
ASSESSMENT/PLAN:     I have reviewed the nursing notes.  I have reviewed the findings, diagnosis, plan and need for follow up with the patient.    Alejandra was seen today for back pain and possible UTI. Onset of symptoms yesterday. Afebrile. VSS. UA small blood, positive nitrate, large leukocytes, many bacteria, RBC 6, .  Will treat for uncomplicated urinary tract infection.  Urine culture pending.      Patient also complaining of left-sided low back pain that is radiating to her posterior thigh.  She does have a longstanding history of a bulged disc.  She does not wish for any further treatment for her low back pain today she typically treats this with Tylenol and ice and heat.  Declined referral to physical therapy.     Diagnoses and all orders for this visit:    Acute cystitis without hematuria  -     cefdinir (OMNICEF) 300 MG capsule; Take 1 capsule (300 mg) by mouth 2 times daily for 5 days.    UTI symptoms  -     UA Macroscopic with reflex to Microscopic and Culture  -     Urine Culture           Symptomatic treatments per AVS  Complete medications as prescribed  Return to clinic if symptoms persist/worsen        I explained my diagnostic considerations and recommendations to the patient, who voiced understanding and agreement with the treatment plan. All questions were answered. We discussed potential side effects of any prescribed or recommended therapies, as well as expectations for response to treatments.    Anne Marie Mclain PA-C  [facility]          Nursing Notes:   Sallie Philip LPN  9/20/2024 10:44 AM  Signed  Chief Complaint   Patient presents with    Back Pain     Lower left back pain    UTI     Frequency and pain with urination     Patient here for lower left back pain with radiation to left leg and frequency and pain with urination x3 days. Has been treating with tylenol.     Initial /80   Pulse 76   Temp 98  F (36.7  C) (Tympanic)   Resp 19   Wt 81.9 kg (180  "lb 8 oz)   LMP 01/01/2007 (Approximate)   SpO2 90%   BMI 28.27 kg/m   Estimated body mass index is 28.27 kg/m  as calculated from the following:    Height as of 9/19/24: 1.702 m (5' 7\").    Weight as of this encounter: 81.9 kg (180 lb 8 oz).  Medication Review: complete    The next two questions are to help us understand your food security.  If you are feeling you need any assistance in this area, we have resources available to support you today.          9/20/2024   SDOH- Food Insecurity   Within the past 12 months, did you worry that your food would run out before you got money to buy more? N   Within the past 12 months, did the food you bought just not last and you didn t have money to get more? N            Health Care Directive:  Patient does not have a Health Care Directive or Living Will: Discussed advance care planning with patient; however, patient declined at this time.    Sallie Philip LPN           SUBJECTIVE:   Alejandra Villegas is a 58 year old female who presents to clinic today for evaluation of possible UTI and left low back pain that radiates to posterior left thigh.   Onset: 3 days ago  Course: progression  Associated symptoms: Left low back pain, radiates to left posterior thigh-this is an ongoing problem for her, she has a known herniated disc. She aggravated her back 3 days ago.  She has had urine frequency & pain with urination x 3 days  Treatments: Tylenol for her back          HPI          Past Medical History:   Diagnosis Date    Allergic rhinitis 09/27/2011         Disorder of kidney and ureter 08/08/2008    Kidney disease GFR 87    Dorsalgia     No Comments Provided    Generalized anxiety disorder     No Comments Provided    Hidradenitis suppurativa     No Comments Provided    Other disorders of lung (CODE) 08/01/2007    Pulmonary nodules, stable on follow-up chest CT 12/08.  No further imaging recommended.    Other specified disorders of Eustachian tube, unspecified ear " 02/27/2012         Personal history of other medical treatment (CODE)     Childbirth x4    Rheumatoid arthritis (H) 02/27/2012         Tobacco use     No Comments Provided     Past Surgical History:   Procedure Laterality Date    ARTHROSCOPY KNEE  05/2017    Dr Torres    ARTHROSCOPY KNEE  07/20/2017    Dr Garza, lateral release, meniscal repair    ARTHROTOMY WRIST Left 2011    Dr. Torres    CHOLECYSTECTOMY  06/2007    Emergency tracheotomy following failed intubation for laparoscopic cholecystectomy.    DILATION AND CURETTAGE  09/2006         HERNIA REPAIR  2007    Incisoinal hernia repair    HYSTERECTOMY TOTAL ABDOMINAL  02/2010         IMPLANT STIMULATOR AND LEADS SACRAL NERVE (STAGE ONE AND TWO) N/A 12/11/2018    Procedure: Interstim Battery Replacement;  Surgeon: Rl Ambriz MD;  Location: [facility] OR    INTERSTIM DEVICE STAGE 2  01/06/2014    Dr. Amrbiz [facility]    LAPAROSCOPIC ABLATION ENDOMETRIOSIS  09/2006         OOPHORECTOMY Left 2010     Dr Thorpe    RECONSTRUCT FOREFOOT WITH METATARSOPHALANGEAL (MTP) FUSION Right 5/5/2022    Procedure: Right fibular sesmoid excision and 1st metatarsal phalangeal joint fusion;  Surgeon: Rl Nathan DPM;  Location: [facility] OR    RELEASE CARPAL TUNNEL  02/02/2017         REMOVE HARDWARE FOOT Right 8/10/2023    Procedure: REMOVAL, HARDWARE, FOOT;  Surgeon: Rl Nathan DPM;  Location: [facility] OR    SALPINGO OOPHORECTOMY,R/L/KELSEY Right 06/1999    Right salpingo-oophorectomy for hemorrhagic corpus luteum cyst    TRACHEOSTOMY  2007    emergency following failed intubation during cholecystectomy    TYMPANOSTOMY, LOCAL/TOPICAL ANESTHESIA  08/2006    PE tube placement     Social History     Tobacco Use    Smoking status: Every Day     Current packs/day: 1.00     Average packs/day: 1 pack/day for 43.7 years (43.7 ttl pk-yrs)     Types: Cigarettes     Start date: 1981    Smokeless tobacco: Never   Substance Use Topics    Alcohol use: No     Alcohol/week: 0.0 standard drinks of alcohol      Current Outpatient Medications   Medication Sig Dispense Refill    albuterol (PROAIR HFA/PROVENTIL HFA/VENTOLIN HFA) 108 (90 Base) MCG/ACT inhaler Inhale 1-2 puffs into the lungs every 4 hours as needed for shortness of breath, wheezing or cough 18 g 4    aspirin 81 MG EC tablet Take 1 tablet (81 mg) by mouth 2 times daily 60 tablet 0    atorvastatin (LIPITOR) 40 MG tablet Take 1 tablet (40 mg) by mouth at bedtime 90 tablet 4    budesonide-formoterol (SYMBICORT) 80-4.5 MCG/ACT Inhaler Inhale 2 puffs into the lungs 2 times daily - Rinse mouth well after use to prevent Thrush 10.2 g 11    gabapentin (NEURONTIN) 400 MG capsule Take 2 capsules (800 mg) by mouth 2 times daily 360 capsule 4    ibuprofen (ADVIL/MOTRIN) 800 MG tablet TAKE 1 TABLET BY MOUTH THREE TIMES DAILY AS NEEDED 90 tablet 0    ipratropium - albuterol 0.5 mg/2.5 mg/3 mL (DUONEB) 0.5-2.5 (3) MG/3ML neb solution Take 1 vial (3 mLs) by nebulization every 4 hours as needed for shortness of breath, wheezing or cough 90 mL 11    lisinopril (ZESTRIL) 10 MG tablet Take 1 tablet (10 mg) by mouth daily 90 tablet 4    order for DME Equipment being ordered: home neb set up with mask and tubing 1 Device 0    PARoxetine (PAXIL) 20 MG tablet Take 1 tablet (20 mg) by mouth every morning 90 tablet 4    SM PAIN RELIEVER 325 MG tablet TAKE 1 CAPSULE BY MOUTH EVERY SIX HOURS AS NEEDED FOR PAIN      SUMAtriptan (IMITREX) 50 MG tablet Take 1 tablet (50 mg) by mouth at onset of headache for migraine May repeat in 2 hours. Max 4 tablets/24 hours. 9 tablet 1     Allergies   Allergen Reactions    Adhesive Tape     Bee Pollen Swelling     Seasonal    Bee Venom Unknown    Folic Acid Itching    Meloxicam Unknown    Pollen Extract      Seasonal    Amoxicillin Rash    Liquid Adhesive Rash    Nabumetone GI Disturbance     GI upset    Seasonal Allergies Other (See Comments)     Pollen         Past medical history, past surgical history, current medications and allergies reviewed  and accurate to the best of my knowledge.          OBJECTIVE:     /80   Pulse 76   Temp 98  F (36.7  C) (Tympanic)   Resp 19   Wt 81.9 kg (180 lb 8 oz)   LMP 01/01/2007 (Approximate)   SpO2 90%   BMI 28.27 kg/m    Body mass index is 28.27 kg/m .  Physical Exam    General Appearance: Well appearing female,  No acute distress  Eyes: no injection, no tearing or drainage, eye lids normal  Respiratory: normal respiration, no increased work of breathing, Lungs Clear to auscultation  Cardiac: RRR with no murmurs  Abdomen: soft, nontender, no rigidity, no rebound tenderness or guarding, normal bowel sounds present. Left CVAT.   Musculoskeletal:  spine - TTP left lumbar paraspinals. Normal posture/gait.   Dermatological: no rashes noted of exposed skin  Psychological: normal affect, alert, oriented, and pleasant.         Results for orders placed or performed in visit on 09/20/24   UA Macroscopic with reflex to Microscopic and Culture     Status: Abnormal    Specimen: Urine, Clean Catch   Result Value Ref Range    Color Urine Light Yellow Colorless, Straw, Light Yellow, Yellow    Appearance Urine Slightly Cloudy (A) Clear    Glucose Urine Negative Negative mg/dL    Bilirubin Urine Negative Negative    Ketones Urine Negative Negative mg/dL    Specific Gravity Urine 1.017 1.000 - 1.030    Blood Urine Small (A) Negative    pH Urine 7.0 5.0 - 9.0    Protein Albumin Urine 10 (A) Negative mg/dL    Urobilinogen Urine Normal Normal, 2.0 mg/dL    Nitrite Urine Positive (A) Negative    Leukocyte Esterase Urine Large (A) Negative    Bacteria Urine Many (A) None Seen /HPF    WBC Clumps Urine Present (A) None Seen /HPF    RBC Urine 6 (H) <=2 /HPF    WBC Urine 128 (H) <=5 /HPF    Squamous Epithelials Urine 1 <=1 /HPF    Narrative    Urine Culture ordered based on laboratory criteria

## 2024-09-22 LAB — BACTERIA UR CULT: ABNORMAL

## 2024-09-23 ENCOUNTER — OFFICE VISIT (OUTPATIENT)
Dept: FAMILY MEDICINE | Facility: OTHER | Age: 59
End: 2024-09-23

## 2024-09-23 VITALS
SYSTOLIC BLOOD PRESSURE: 112 MMHG | RESPIRATION RATE: 20 BRPM | OXYGEN SATURATION: 96 % | WEIGHT: 182.1 LBS | DIASTOLIC BLOOD PRESSURE: 65 MMHG | HEIGHT: 64 IN | HEART RATE: 76 BPM | TEMPERATURE: 97.1 F | BODY MASS INDEX: 31.09 KG/M2

## 2024-09-23 DIAGNOSIS — M79.605 PAIN OF LEFT LOWER EXTREMITY: ICD-10-CM

## 2024-09-23 DIAGNOSIS — B02.23 POST-HERPETIC POLYNEUROPATHY: Primary | ICD-10-CM

## 2024-09-23 PROCEDURE — 99213 OFFICE O/P EST LOW 20 MIN: CPT | Performed by: NURSE PRACTITIONER

## 2024-09-23 RX ORDER — CYCLOBENZAPRINE HCL 10 MG
10 TABLET ORAL 2 TIMES DAILY PRN
Qty: 30 TABLET | Refills: 0 | Status: SHIPPED | OUTPATIENT
Start: 2024-09-23

## 2024-09-23 ASSESSMENT — PAIN SCALES - GENERAL: PAINLEVEL: WORST PAIN (10)

## 2024-09-23 NOTE — PROGRESS NOTES
"ASSESSMENT/PLAN:     I have reviewed the nursing notes.  I have reviewed the findings, diagnosis, plan and need for follow up with the patient.        1. Pain of left lower extremity  - cyclobenzaprine (FLEXERIL) 10 MG tablet; Take 1 tablet (10 mg) by mouth 2 times daily as needed for muscle spasms.  Dispense: 30 tablet; Refill: 0    2. Post-herpetic polyneuropathy  Pain and rash started 6 days ago.  Discussed with patient she is past the treatment window for antiviral medications.  Discussed treatment options with patient per UpToDate.    Patient is currently on gabapentin.  Discussed starting amitriptyline at bedtime but this interacts with Paxil so unable to use.  Discussed with patient I am not comfortable starting her on antiseizure medications, if her pain persist she would need to follow-up with her primary provider to discuss.  Recommend use of lidocaine patches and capsaicin cream.  May use over-the-counter Tylenol or ibuprofen PRN    Discussed warning signs/symptoms indicative of need to f/u  Follow up if symptoms persist or worsen or concerns      I explained my diagnostic considerations and recommendations to the patient, who voiced understanding and agreement with the treatment plan. All questions were answered. We discussed potential side effects of any prescribed or recommended therapies, as well as expectations for response to treatments.    Chelle Mccauley NP  [facility]      SUBJECTIVE:   Alejandra Villegas is a 58 year old female who presents to clinic today for the following health issues:  Left leg pain    HPI  Left leg pain for the past 6 days.  Describes as \"paige horse pain\" starting in left hip/buttock and radiating down the outside of her leg to the calf.  Pain deep, achy, sharp, tingling, burning, jabbing, etc.   The day after the pain started she developed a rash on her left lower back and buttocks.  She is currently on Gabapentin for chronic radiculopathy from " her back.   She states heat helps with the pain.  Ice worsens the pain.      Past Medical History:   Diagnosis Date    Allergic rhinitis 09/27/2011         Disorder of kidney and ureter 08/08/2008    Kidney disease GFR 87    Dorsalgia     No Comments Provided    Generalized anxiety disorder     No Comments Provided    Hidradenitis suppurativa     No Comments Provided    Other disorders of lung (CODE) 08/01/2007    Pulmonary nodules, stable on follow-up chest CT 12/08.  No further imaging recommended.    Other specified disorders of Eustachian tube, unspecified ear 02/27/2012         Personal history of other medical treatment (CODE)     Childbirth x4    Rheumatoid arthritis (H) 02/27/2012         Tobacco use     No Comments Provided     Past Surgical History:   Procedure Laterality Date    ARTHROSCOPY KNEE  05/2017    Dr Torres    ARTHROSCOPY KNEE  07/20/2017    Dr Garza, lateral release, meniscal repair    ARTHROTOMY WRIST Left 2011    Dr. Torres    CHOLECYSTECTOMY  06/2007    Emergency tracheotomy following failed intubation for laparoscopic cholecystectomy.    DILATION AND CURETTAGE  09/2006         HERNIA REPAIR  2007    Incisoinal hernia repair    HYSTERECTOMY TOTAL ABDOMINAL  02/2010         IMPLANT STIMULATOR AND LEADS SACRAL NERVE (STAGE ONE AND TWO) N/A 12/11/2018    Procedure: Interstim Battery Replacement;  Surgeon: Rl Ambriz MD;  Location:  OR    INTERSTIM DEVICE STAGE 2  01/06/2014    Dr. Ambriz [facility]    LAPAROSCOPIC ABLATION ENDOMETRIOSIS  09/2006         OOPHORECTOMY Left 2010     Dr Thorpe    RECONSTRUCT FOREFOOT WITH METATARSOPHALANGEAL (MTP) FUSION Right 5/5/2022    Procedure: Right fibular sesmoid excision and 1st metatarsal phalangeal joint fusion;  Surgeon: Rl Nathan DPM;  Location:  OR    RELEASE CARPAL TUNNEL  02/02/2017         REMOVE HARDWARE FOOT Right 8/10/2023    Procedure: REMOVAL, HARDWARE, FOOT;  Surgeon: Rl Nathan DPM;  Location:  OR    SALPINGO  OOPHORECTOMY,R/L/KELSEY Right 06/1999    Right salpingo-oophorectomy for hemorrhagic corpus luteum cyst    TRACHEOSTOMY  2007    emergency following failed intubation during cholecystectomy    TYMPANOSTOMY, LOCAL/TOPICAL ANESTHESIA  08/2006    PE tube placement     Social History     Tobacco Use    Smoking status: Every Day     Current packs/day: 1.00     Average packs/day: 1 pack/day for 43.7 years (43.7 ttl pk-yrs)     Types: Cigarettes     Start date: 1981    Smokeless tobacco: Never   Substance Use Topics    Alcohol use: No     Alcohol/week: 0.0 standard drinks of alcohol     Current Outpatient Medications   Medication Sig Dispense Refill    albuterol (PROAIR HFA/PROVENTIL HFA/VENTOLIN HFA) 108 (90 Base) MCG/ACT inhaler Inhale 1-2 puffs into the lungs every 4 hours as needed for shortness of breath, wheezing or cough 18 g 4    aspirin 81 MG EC tablet Take 1 tablet (81 mg) by mouth 2 times daily 60 tablet 0    atorvastatin (LIPITOR) 40 MG tablet Take 1 tablet (40 mg) by mouth at bedtime 90 tablet 4    budesonide-formoterol (SYMBICORT) 80-4.5 MCG/ACT Inhaler Inhale 2 puffs into the lungs 2 times daily - Rinse mouth well after use to prevent Thrush 10.2 g 11    cefdinir (OMNICEF) 300 MG capsule Take 1 capsule (300 mg) by mouth 2 times daily for 5 days. 10 capsule 0    gabapentin (NEURONTIN) 400 MG capsule Take 2 capsules (800 mg) by mouth 2 times daily 360 capsule 4    ibuprofen (ADVIL/MOTRIN) 800 MG tablet TAKE 1 TABLET BY MOUTH THREE TIMES DAILY AS NEEDED 90 tablet 0    ipratropium - albuterol 0.5 mg/2.5 mg/3 mL (DUONEB) 0.5-2.5 (3) MG/3ML neb solution Take 1 vial (3 mLs) by nebulization every 4 hours as needed for shortness of breath, wheezing or cough 90 mL 11    lisinopril (ZESTRIL) 10 MG tablet Take 1 tablet (10 mg) by mouth daily 90 tablet 4    order for DME Equipment being ordered: home neb set up with mask and tubing 1 Device 0    PARoxetine (PAXIL) 20 MG tablet Take 1 tablet (20 mg) by mouth every morning 90  "tablet 4    SM PAIN RELIEVER 325 MG tablet TAKE 1 CAPSULE BY MOUTH EVERY SIX HOURS AS NEEDED FOR PAIN      SUMAtriptan (IMITREX) 50 MG tablet Take 1 tablet (50 mg) by mouth at onset of headache for migraine May repeat in 2 hours. Max 4 tablets/24 hours. 9 tablet 1     Allergies   Allergen Reactions    Adhesive Tape     Bee Pollen Swelling     Seasonal    Bee Venom Unknown    Folic Acid Itching    Meloxicam Unknown    Pollen Extract      Seasonal    Amoxicillin Rash    Liquid Adhesive Rash    Nabumetone GI Disturbance     GI upset    Seasonal Allergies Other (See Comments)     Pollen         Past medical history, past surgical history, current medications and allergies reviewed and accurate to the best of my knowledge.        OBJECTIVE:     /65 (BP Location: Left arm, Patient Position: Sitting, Cuff Size: Adult Regular)   Pulse 76   Temp 97.1  F (36.2  C) (Temporal)   Resp 20   Ht 1.626 m (5' 4\")   Wt 82.6 kg (182 lb 1.6 oz)   LMP 01/01/2007 (Approximate)   SpO2 96%   BMI 31.26 kg/m    Body mass index is 31.26 kg/m .        Physical Exam  General Appearance:  miserable appearing adult female, appropriate appearance for age. No acute distress.  Sitting in chair leaning on desk  Respiratory: normal chest wall and respirations.  Normal effort.  No cough appreciated.  Cardiovascular: CMS intact to left lower extremity.  Left calf soft.  No lower extremity edema.  Musculoskeletal:  Equal movement of bilateral upper extremities.  Equal movement of bilateral lower extremities.  Normal gait.    Dermatological:  left buttocks with erythematous based clustered vesicular rash  Psychological: normal affect, alert, oriented, and pleasant.         "

## 2024-09-23 NOTE — PROGRESS NOTES
"Chief Complaint   Patient presents with    Musculoskeletal Problem     Left    Patient presents to the rapid clinic today for concerns of left leg pain. Patient states the pain started last Tuesday. Patient states that the pain feels like a paige horse and has tried hot/cold therapy and tylenol. Patient states she last took tylenol at 10am today.          Initial /65 (BP Location: Left arm, Patient Position: Sitting, Cuff Size: Adult Regular)   Pulse 76   Temp 97.1  F (36.2  C) (Temporal)   Resp 20   Ht 1.626 m (5' 4\")   Wt 82.6 kg (182 lb 1.6 oz)   LMP 01/01/2007 (Approximate)   SpO2 96%   BMI 31.26 kg/m   Estimated body mass index is 31.26 kg/m  as calculated from the following:    Height as of this encounter: 1.626 m (5' 4\").    Weight as of this encounter: 82.6 kg (182 lb 1.6 oz).     FOOD SECURITY SCREENING QUESTIONS:    The next two questions are to help us understand your food security.  If you are feeling you need any assistance in this area, we have resources available to support you today.    Hunger Vital Signs:  Within the past 12 months we worried whether our food would run out before we got money to buy more. Never  Within the past 12 months the food we bought just didn't last and we didn't have money to get more. Never      Rosendo Cedeño   "

## 2024-09-27 ENCOUNTER — OFFICE VISIT (OUTPATIENT)
Dept: FAMILY MEDICINE | Facility: OTHER | Age: 59
End: 2024-09-27
Attending: FAMILY MEDICINE

## 2024-09-27 VITALS
HEART RATE: 72 BPM | DIASTOLIC BLOOD PRESSURE: 70 MMHG | TEMPERATURE: 96.8 F | SYSTOLIC BLOOD PRESSURE: 107 MMHG | RESPIRATION RATE: 18 BRPM | HEIGHT: 64 IN | WEIGHT: 183.4 LBS | BODY MASS INDEX: 31.31 KG/M2 | OXYGEN SATURATION: 96 %

## 2024-09-27 DIAGNOSIS — R20.0 NUMBNESS OF LEFT FOOT: Primary | ICD-10-CM

## 2024-09-27 DIAGNOSIS — B02.8 HERPES ZOSTER WITH OTHER COMPLICATION: ICD-10-CM

## 2024-09-27 PROCEDURE — 99213 OFFICE O/P EST LOW 20 MIN: CPT | Performed by: NURSE PRACTITIONER

## 2024-09-27 RX ORDER — VALACYCLOVIR HYDROCHLORIDE 1 G/1
1000 TABLET, FILM COATED ORAL 3 TIMES DAILY
Qty: 21 TABLET | Refills: 0 | Status: SHIPPED | OUTPATIENT
Start: 2024-09-27 | End: 2024-10-04

## 2024-09-27 ASSESSMENT — PAIN SCALES - GENERAL: PAINLEVEL: EXTREME PAIN (9)

## 2024-09-27 NOTE — NURSING NOTE
"Chief Complaint   Patient presents with    Numbness     Left foot    Patient presents to the clinic today for numbness in her left foot.   Patient states that the numbness in her foot started about two days ago.   Initial /70 (BP Location: Left arm, Patient Position: Sitting, Cuff Size: Adult Regular)   Pulse 72   Temp 96.8  F (36  C) (Tympanic)   Resp 18   Ht 1.626 m (5' 4\")   Wt 83.2 kg (183 lb 6.4 oz)   LMP 01/01/2007 (Approximate)   SpO2 96%   BMI 31.48 kg/m   Estimated body mass index is 31.48 kg/m  as calculated from the following:    Height as of this encounter: 1.626 m (5' 4\").    Weight as of this encounter: 83.2 kg (183 lb 6.4 oz).  Medication Review: complete    The next two questions are to help us understand your food security.  If you are feeling you need any assistance in this area, we have resources available to support you today.          9/20/2024   SDOH- Food Insecurity   Within the past 12 months, did you worry that your food would run out before you got money to buy more? N   Within the past 12 months, did the food you bought just not last and you didn t have money to get more? N            Health Care Directive:  Patient does not have a Health Care Directive or Living Will: Discussed advance care planning with patient; however, patient declined at this time.    Elizabeth Weiner      "

## 2024-09-27 NOTE — PROGRESS NOTES
Alejandra Villegas  1965    ASSESSMENT/PLAN      Presents to [facility] clinic with left foot numbness, worsening herpes zoster.  Discussed risk and benefit with patient, although her herpes zoster did begin many days ago, given her worsening rash and increased left foot pain will attempt to slow down virus with Valtrex.  Patient's vitals are stable and she appears nontoxic.        1. Numbness of left foot  2. Herpes zoster with other complication    - valACYclovir (VALTREX) 1000 mg tablet; Take 1 tablet (1,000 mg) by mouth 3 times daily for 7 days.  Dispense: 21 tablet; Refill: 0  - May use over-the-counter Tylenol or ibuprofen PRN  - Follow up as needed for new or worsening symptoms      *Explanation of diagnosis, treatment options and risk and benefits of medications reviewed with patient. Patient agrees with plan of care.  *All questions were answered.    *Red flags symptoms were discussed and patient was advised when they should return for reevaluation or for prompt emergency evaluation.   *Patient was given verbal and written instructions on plan of care. Instructions were printed or are available on [employer] on electronic AVS.   *We discussed potential side effects of any prescribed or recommended therapies, as well as expectations for response to treatments.  *Patient discharged in stable condition    Jyothi Villalobos, CNP  [facility]    SUBJECTIVE  CHIEF COMPLAINT/ REASON FOR VISIT  Patient presents with:  Numbness: Left foot      HISTORY OF PRESENT ILLNESS  Alejandra Villegas is a pleasant 58 year old female presents to [facility] clinic today with history of recently diagnosed shingles, worsening rash on bottom and increasing numbness going down to left foot.  Does have history of low back issues with some numbness and tingling, this feels different.  Loss of bowel and bladder control.    I have reviewed the nursing notes.  I have reviewed allergies, medication list, problem list, and past  "medical history.    REVIEW OF SYSTEMS  Review of Systems   Constitutional: Negative.    HENT: Negative.     Respiratory: Negative.     Cardiovascular: Negative.    Gastrointestinal: Negative.    Genitourinary: Negative.    Musculoskeletal: Negative.    Skin:  Positive for rash.   Neurological:  Positive for numbness.   Hematological: Negative.    Psychiatric/Behavioral: Negative.     All other systems reviewed and are negative.       VITAL SIGNS  Vitals:    09/27/24 1348   BP: 107/70   BP Location: Left arm   Patient Position: Sitting   Cuff Size: Adult Regular   Pulse: 72   Resp: 18   Temp: 96.8  F (36  C)   TempSrc: Tympanic   SpO2: 96%   Weight: 83.2 kg (183 lb 6.4 oz)   Height: 1.626 m (5' 4\")      Body mass index is 31.48 kg/m .      OBJECTIVE  PHYSICAL EXAM  Physical Exam  Vitals and nursing note reviewed.   Constitutional:       Appearance: Normal appearance.   HENT:      Head: Normocephalic.      Nose: Nose normal.      Mouth/Throat:      Mouth: Mucous membranes are moist.   Eyes:      Pupils: Pupils are equal, round, and reactive to light.   Cardiovascular:      Rate and Rhythm: Normal rate and regular rhythm.      Pulses: Normal pulses.      Heart sounds: Normal heart sounds.   Pulmonary:      Effort: Pulmonary effort is normal.      Breath sounds: Normal breath sounds.   Abdominal:      General: Bowel sounds are normal.   Musculoskeletal:         General: Normal range of motion.      Cervical back: Normal range of motion.   Skin:     General: Skin is warm and dry.      Capillary Refill: Capillary refill takes less than 2 seconds.      Findings: Rash present.   Neurological:      General: No focal deficit present.      Mental Status: She is alert.        "

## 2024-09-27 NOTE — LETTER
September 27, 2024      Alejandra Villegas  [address]  [place] [address]        To Whom It May Concern:    Alejandra Villegas was seen on 9/27/24.  Please excuse her until 9/29/24 due to illness.        Sincerely,        Jyothi Villalobos, CNP

## 2024-09-30 ASSESSMENT — ENCOUNTER SYMPTOMS
CONSTITUTIONAL NEGATIVE: 1
PSYCHIATRIC NEGATIVE: 1
NUMBNESS: 1
CARDIOVASCULAR NEGATIVE: 1
MUSCULOSKELETAL NEGATIVE: 1
RESPIRATORY NEGATIVE: 1
GASTROINTESTINAL NEGATIVE: 1
HEMATOLOGIC/LYMPHATIC NEGATIVE: 1

## 2024-10-24 ENCOUNTER — OFFICE VISIT (OUTPATIENT)
Dept: ORTHOPEDICS | Facility: OTHER | Age: 59
End: 2024-10-24
Attending: PODIATRIST

## 2024-10-24 ENCOUNTER — HOSPITAL ENCOUNTER (OUTPATIENT)
Dept: GENERAL RADIOLOGY | Facility: OTHER | Age: 59
Discharge: HOME | End: 2024-10-24
Attending: PODIATRIST

## 2024-10-24 DIAGNOSIS — M79.672 LEFT FOOT PAIN: ICD-10-CM

## 2024-10-24 DIAGNOSIS — M79.672 LEFT FOOT PAIN: Primary | ICD-10-CM

## 2024-10-24 PROCEDURE — 99213 OFFICE O/P EST LOW 20 MIN: CPT | Performed by: PODIATRIST

## 2024-10-24 PROCEDURE — 73630 X-RAY EXAM OF FOOT: CPT | Mod: LT

## 2024-10-24 NOTE — PROGRESS NOTES
6/21/24//    I called and left a voicemail for the patient's daughter about scheduling   SUBJECTIVE:  Alejandra is known to me.  She has a history of right foot surgery.  Right foot is doing well she is here for left foot arch pain heel pain and forefoot pain no injury but she does has quite significant pain and has a hard time walking on it she has a lump on the plantar aspect of her arch.  She is here to have this evaluated discussed options.    ROS: Musculoskeletal and general review of systems are negative, per review of previous clinic questionnaire.  Denies SOB and calf pain.    EXAM:   PHYSICAL EXAMINATION:   CONSTITUTIONAL:  The patient is alert and oriented x 3, well appearing and in no apparent distress.  Affect is pleasant and answers questions appropriately.  VASCULAR:  Circulation is intact with palpable pedal pulses and adequate capillary fill time to all digits.  Hair growth is present and appropriate to mid foot and digits. Calf nontender.  NEUROLOGIC:  Light touch sensation is intact to digits.  There is a negative Tinel sign.    INTEGUMENT:  No abnormal dermatologic lesions are noted.  Skin has normal texture and turgor.    MUSCULOSKELETAL: Diffuse pain she has pain to heel squeeze very tender in her arch where she has palpable lump possible plantar fibroma or plantar fascia injury.  Diffuse tenderness to her forefoot as well.    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  Given soft tissue mass extent of symptoms on plantar aspect of her foot we will workup with an MRI.  I will see her back next week to discuss.    Rl Nathan DPM

## 2024-10-29 DIAGNOSIS — M79.605 PAIN OF LEFT LOWER EXTREMITY: ICD-10-CM

## 2024-10-30 RX ORDER — CYCLOBENZAPRINE HCL 10 MG
10 TABLET ORAL 2 TIMES DAILY PRN
Qty: 30 TABLET | Refills: 0 | OUTPATIENT
Start: 2024-10-30

## 2024-10-30 NOTE — TELEPHONE ENCOUNTER
[id number] of [place] sent Rx request for the following:      Requested Prescriptions   Pending Prescriptions Disp Refills    cyclobenzaprine (FLEXERIL) 10 MG tablet 30 tablet 0     Sig: Take 1 tablet (10 mg) by mouth 2 times daily as needed for muscle spasms.       There is no refill protocol information for this order        Last Prescription Date:   9/23/24  Last Fill Qty/Refills:         30, R-0 ([facility])      PCP: Norma Shah of [place]. Pharmacy notified. Consuelo Riley RN .............. 10/30/2024  9:52 AM

## 2024-10-31 ENCOUNTER — OFFICE VISIT (OUTPATIENT)
Dept: ORTHOPEDICS | Facility: OTHER | Age: 59
End: 2024-10-31
Attending: PODIATRIST

## 2024-10-31 DIAGNOSIS — M79.672 LEFT FOOT PAIN: Primary | ICD-10-CM

## 2024-10-31 PROCEDURE — 99024 POSTOP FOLLOW-UP VISIT: CPT | Performed by: PODIATRIST

## 2024-10-31 NOTE — PROGRESS NOTES
SUBJECTIVE:  Chronic is known to me I wanted an MRI to workup plantar fibromatosis versus partial rupture.  She still quite tender near arch of her foot underneath her TN joint.  There is some small palpable masses here but exam is still very limited here she has some increasing pain laterally along peroneal tendons which are quite tight on exam today as well.    ROS: Musculoskeletal and general review of systems are negative, per review of previous clinic questionnaire.  Denies SOB and calf pain.    EXAM:   PHYSICAL EXAMINATION:   CONSTITUTIONAL:  The patient is alert and oriented x 3, well appearing and in no apparent distress.  Affect is pleasant and answers questions appropriately.  VASCULAR:  Circulation is intact with palpable pedal pulses and adequate capillary fill time to all digits.  Hair growth is present and appropriate to mid foot and digits. Calf nontender.  NEUROLOGIC:  Light touch sensation is intact to digits.  There is a negative Tinel sign.    INTEGUMENT:  No abnormal dermatologic lesions are noted.  Skin has normal texture and turgor.    MUSCULOSKELETAL: Exam largely unchanged very tender arch small possible plantar fibromatosis type lumps possible plantar fascial injury.  She has increased tenderness to the lateral ankle her peroneal tendons are quite tight today they are tender to palpate.    IMAGING: Patient is unable to get an MRI due to stimulator    ASSESSMENT: Ongoing foot pain unable to get MRI.    PLAN OF CARE: I discussed condition and treatment patient today.  We will try to get ultrasound evaluation for said diagnosis.  This was ordered I will see her back after.    Rl Nathan DPM

## 2024-11-04 ENCOUNTER — APPOINTMENT (OUTPATIENT)
Dept: GENERAL RADIOLOGY | Facility: OTHER | Age: 59
End: 2024-11-04

## 2024-11-04 ENCOUNTER — HOSPITAL ENCOUNTER (EMERGENCY)
Facility: OTHER | Age: 59
Discharge: HOME | End: 2024-11-04

## 2024-11-04 VITALS
OXYGEN SATURATION: 95 % | RESPIRATION RATE: 22 BRPM | SYSTOLIC BLOOD PRESSURE: 123 MMHG | TEMPERATURE: 97.5 F | DIASTOLIC BLOOD PRESSURE: 81 MMHG | HEART RATE: 95 BPM

## 2024-11-04 DIAGNOSIS — M77.32 CALCANEAL SPUR OF FOOT, LEFT: ICD-10-CM

## 2024-11-04 DIAGNOSIS — G89.29 CHRONIC FOOT PAIN, LEFT: ICD-10-CM

## 2024-11-04 DIAGNOSIS — M79.672 CHRONIC FOOT PAIN, LEFT: ICD-10-CM

## 2024-11-04 PROCEDURE — 73610 X-RAY EXAM OF ANKLE: CPT | Mod: LT

## 2024-11-04 PROCEDURE — 250N000011 HC RX IP 250 OP 636

## 2024-11-04 PROCEDURE — 99284 EMERGENCY DEPT VISIT MOD MDM: CPT

## 2024-11-04 PROCEDURE — 250N000013 HC RX MED GY IP 250 OP 250 PS 637

## 2024-11-04 PROCEDURE — 96372 THER/PROPH/DIAG INJ SC/IM: CPT

## 2024-11-04 RX ORDER — CAPSAICIN 0.025 %
CREAM (GRAM) TOPICAL ONCE
Status: COMPLETED | OUTPATIENT
Start: 2024-11-04 | End: 2024-11-04

## 2024-11-04 RX ORDER — KETOROLAC TROMETHAMINE 30 MG/ML
30 INJECTION, SOLUTION INTRAMUSCULAR; INTRAVENOUS ONCE
Status: COMPLETED | OUTPATIENT
Start: 2024-11-04 | End: 2024-11-04

## 2024-11-04 RX ADMIN — CAPSAICIN: 0.25 CREAM TOPICAL at 14:33

## 2024-11-04 RX ADMIN — KETOROLAC TROMETHAMINE 30 MG: 30 INJECTION, SOLUTION INTRAMUSCULAR at 14:33

## 2024-11-04 ASSESSMENT — COLUMBIA-SUICIDE SEVERITY RATING SCALE - C-SSRS
2. HAVE YOU ACTUALLY HAD ANY THOUGHTS OF KILLING YOURSELF IN THE PAST MONTH?: NO
6. HAVE YOU EVER DONE ANYTHING, STARTED TO DO ANYTHING, OR PREPARED TO DO ANYTHING TO END YOUR LIFE?: NO
1. IN THE PAST MONTH, HAVE YOU WISHED YOU WERE DEAD OR WISHED YOU COULD GO TO SLEEP AND NOT WAKE UP?: NO

## 2024-11-04 ASSESSMENT — ACTIVITIES OF DAILY LIVING (ADL)
ADLS_ACUITY_SCORE: 0

## 2024-11-04 ASSESSMENT — ENCOUNTER SYMPTOMS: ARTHRALGIAS: 1

## 2024-11-04 NOTE — ED TRIAGE NOTES
Pt arrives via private car with complaints of 9/10 left foot pain. Pt states a month ago she started having pain in her left foot and went in to see her surgeon; she states she has a cyst inside of her foot and is suppose to have an ultrasound in [place] tomorrow but she can't handle the pain anymore. Has tried gabapentin, flexeril and tylenol.     Triage Assessment (Adult)       Row Name 11/04/24 5264          Triage Assessment    Airway WDL WDL        Respiratory WDL    Respiratory WDL WDL        Skin Circulation/Temperature WDL    Skin Circulation/Temperature WDL WDL        Cardiac WDL    Cardiac WDL WDL        Peripheral/Neurovascular WDL    Peripheral Neurovascular WDL X        Cognitive/Neuro/Behavioral WDL    Cognitive/Neuro/Behavioral WDL WDL

## 2024-11-04 NOTE — ED PROVIDER NOTES
History     Chief Complaint   Patient presents with    Foot Pain     HPI  Alejandra Villegas is a 58 year old female with chronic left foot pain. She follows with Dr. Nathan, orthopedics. She has a scheduled ultrasound of left foot tomorrow at 1245 in [place]. She reports no relief with tylenol, ibuprofen, gabapentin, and muscle relaxant. She reports the pain is unbearable to left outer ankle and the bottom of her foot. Denies fever or chills. Denies any new injuries. She is requesting something for her foot pain.     Allergies:  Allergies   Allergen Reactions    Adhesive Tape     Bee Pollen Swelling     Seasonal    Bee Venom Unknown    Folic Acid Itching    Meloxicam Unknown    Pollen Extract      Seasonal    Amoxicillin Rash    Liquid Adhesive Rash    Nabumetone GI Disturbance     GI upset       Problem List:    Patient Active Problem List    Diagnosis Date Noted    Pulmonary emphysema, unspecified emphysema type (H) 01/23/2024     Priority: Medium    Benign essential hypertension 01/23/2024     Priority: Medium    Mixed hyperlipidemia 01/23/2024     Priority: Medium    Pain in joint, ankle and foot, left 10/05/2022     Priority: Medium    CKD (chronic kidney disease) stage 2, GFR 60-89 ml/min 03/31/2022     Priority: Medium    Osteopenia of right foot 02/03/2021     Priority: Medium    Anxiety state 01/30/2018     Priority: Medium    Other isolated or specific phobias 01/30/2018     Priority: Medium    Degenerative disc disease at L5-S1 level 01/30/2018     Priority: Medium    Hidradenitis suppurativa 01/30/2018     Priority: Medium    Tobacco use disorder 01/30/2018     Priority: Medium    Gastroesophageal reflux disease 01/18/2017     Priority: Medium    Pain management contract broken 06/05/2015     Priority: Medium     Overview:   Contract violation - see 12/1/15 letter.      Urge incontinence 06/04/2014     Priority: Medium    Alopecia areata 02/21/2014     Priority: Medium    Benign paroxysmal positional  vertigo 02/28/2013     Priority: Medium    Ovarian cyst, left 05/09/2012     Priority: Medium    Other acquired deformity of ankle and foot(736.79) 05/09/2012     Priority: Medium    RA (rheumatoid arthritis) (H) 05/09/2012     Priority: Medium     Overview:   Diagnosed [place] 2012      Asthma 05/04/2012     Priority: Medium    Seasonal allergic rhinitis 09/27/2011     Priority: Medium    Symptomatic menopausal or female climacteric states 09/21/2010     Priority: Medium    Other diseases of lung, not elsewhere classified 08/01/2007     Priority: Medium        Past Medical History:    Past Medical History:   Diagnosis Date    Allergic rhinitis 09/27/2011    Disorder of kidney and ureter 08/08/2008    Dorsalgia     Generalized anxiety disorder     Hidradenitis suppurativa     Other disorders of lung (CODE) 08/01/2007    Other specified disorders of Eustachian tube, unspecified ear 02/27/2012    Personal history of other medical treatment (CODE)     Rheumatoid arthritis (H) 02/27/2012    Tobacco use        Past Surgical History:    Past Surgical History:   Procedure Laterality Date    ARTHROSCOPY KNEE  05/2017    Dr Torres    ARTHROSCOPY KNEE  07/20/2017    Dr Garza, lateral release, meniscal repair    ARTHROTOMY WRIST Left 2011    Dr. Torres    CHOLECYSTECTOMY  06/2007    Emergency tracheotomy following failed intubation for laparoscopic cholecystectomy.    DILATION AND CURETTAGE  09/2006         HERNIA REPAIR  2007    Incisoinal hernia repair    HYSTERECTOMY TOTAL ABDOMINAL  02/2010         IMPLANT STIMULATOR AND LEADS SACRAL NERVE (STAGE ONE AND TWO) N/A 12/11/2018    Procedure: Interstim Battery Replacement;  Surgeon: Rl Ambriz MD;  Location:  OR    INTERSTIM DEVICE STAGE 2  01/06/2014    Dr. Ambriz [facility]    LAPAROSCOPIC ABLATION ENDOMETRIOSIS  09/2006         OOPHORECTOMY Left 2010     Dr Thorpe    RECONSTRUCT FOREFOOT WITH METATARSOPHALANGEAL (MTP) FUSION Right 5/5/2022    Procedure: Right  fibular sesmoid excision and 1st metatarsal phalangeal joint fusion;  Surgeon: Rl Nathan DPM;  Location: [facility] OR    RELEASE CARPAL TUNNEL  02/02/2017         REMOVE HARDWARE FOOT Right 8/10/2023    Procedure: REMOVAL, HARDWARE, FOOT;  Surgeon: Rl Nathan DPM;  Location: [facility] OR    SALPINGO OOPHORECTOMY,R/L/KELSEY Right 06/1999    Right salpingo-oophorectomy for hemorrhagic corpus luteum cyst    TRACHEOSTOMY  2007    emergency following failed intubation during cholecystectomy    TYMPANOSTOMY, LOCAL/TOPICAL ANESTHESIA  08/2006    PE tube placement       Family History:    Family History   Problem Relation Age of Onset    Arthritis Mother         Arthritis,Rheumatoid    Cancer Mother         Cancer, lung and uterine cancer    Heart Disease Father         Heart Disease,CAD, CABG, peripheral vascular dise    Thyroid Disease Father         Thyroid Disease, hyperlipidemia    Other - See Comments Father         djd    Asthma Brother         Asthma    Asthma Sister         Asthma    Other - See Comments Sister         Psychiatric illness,Anxiety    Other - See Comments Son         x2 back surgeries    Breast Cancer No family hx of         Cancer-breast       Social History:  Marital Status:   [4]  Social History     Tobacco Use    Smoking status: Every Day     Current packs/day: 1.00     Average packs/day: 1 pack/day for 43.8 years (43.8 ttl pk-yrs)     Types: Cigarettes     Start date: 1981    Smokeless tobacco: Never   Vaping Use    Vaping status: Never Used   Substance Use Topics    Alcohol use: No     Alcohol/week: 0.0 standard drinks of alcohol    Drug use: No        Medications:    albuterol (PROAIR HFA/PROVENTIL HFA/VENTOLIN HFA) 108 (90 Base) MCG/ACT inhaler  aspirin 81 MG EC tablet  atorvastatin (LIPITOR) 40 MG tablet  budesonide-formoterol (SYMBICORT) 80-4.5 MCG/ACT Inhaler  cyclobenzaprine (FLEXERIL) 10 MG tablet  gabapentin (NEURONTIN) 400 MG capsule  ibuprofen (ADVIL/MOTRIN) 800 MG  tablet  ipratropium - albuterol 0.5 mg/2.5 mg/3 mL (DUONEB) 0.5-2.5 (3) MG/3ML neb solution  lisinopril (ZESTRIL) 10 MG tablet  order for DME  PARoxetine (PAXIL) 20 MG tablet  SM PAIN RELIEVER 325 MG tablet  SUMAtriptan (IMITREX) 50 MG tablet  valACYclovir (VALTREX) 1000 mg tablet          Review of Systems   Musculoskeletal:  Positive for arthralgias.        Left foot and ankle pain.    All other systems reviewed and are negative.      Physical Exam   BP: 123/81  Pulse: 95  Temp: 97.5  F (36.4  C)  Resp: 22  SpO2: 95 %      Physical Exam  Vitals and nursing note reviewed.   Constitutional:       General: She is in acute distress (moderate distress r/t pain).      Appearance: Normal appearance. She is not ill-appearing.   Cardiovascular:      Rate and Rhythm: Normal rate and regular rhythm.   Musculoskeletal:      Comments: No visible deformity noted. No bruising or erythema. Mild swelling noted to left lateral malleolus. Full ROM of left foot. Sensation is intact. Distal pulses present and strong. Tenderness on palpation to plantar midfoot, and lateral malleolus region.     Skin:     General: Skin is warm and dry.      Capillary Refill: Capillary refill takes less than 2 seconds.   Neurological:      General: No focal deficit present.      Mental Status: She is alert and oriented to person, place, and time.      GCS: GCS eye subscore is 4. GCS verbal subscore is 5. GCS motor subscore is 6.      Sensory: Sensation is intact.      Motor: Motor function is intact.   Psychiatric:         Mood and Affect: Mood normal.            Results for orders placed or performed during the hospital encounter of 11/04/24 (from the past 24 hours)   Lab Blood Morphology Pathologist Review *Canceled*    Narrative    The following orders were created for panel order Lab Blood Morphology Pathologist Review.  Procedure                               Abnormality         Status                     ---------                                -----------         ------                       Please view results for these tests on the individual orders.   XR Ankle Left G/E 3 Views    Narrative    PROCEDURE:  XR ANKLE LEFT G/E 3 VIEWS    HISTORY: left lateral ankle pain and swelling.    COMPARISON:  None.    TECHNIQUE:  3 views of the left ankle were obtained.    FINDINGS:  No fracture or dislocation is identified. The joint spaces  are preserved.  There is bony spurring at the insertion of the  Achilles tendon into the calcaneus.      Impression    IMPRESSION: Calcaneal spur      ISABELLA SANTANA MD         SYSTEM ID:  [id number]       Medications   ketorolac (TORADOL) injection 30 mg (30 mg Intramuscular $Given 11/4/24 1433)   capsaicin (ZOSTRIX) 0.025 % cream ( Topical $Given 11/4/24 1423)       Assessments & Plan (with Medical Decision Making)  Alejandra Villegas is a 58 year old female with chronic left foot pain. She follows with Dr. Nathan, orthopedics. She has a scheduled ultrasound of left foot tomorrow at 1245 in [place]. She reports no relief with tylenol, ibuprofen, gabapentin, and muscle relaxant. She reports the pain is unbearable to left outer ankle and the bottom of her foot. Denies fever or chills. Denies any new injuries. She is requesting something for her foot pain.   VS in the ED. /81   Pulse 95   Temp 97.5  F (36.4  C) (Tympanic)   Resp 22   LMP 01/01/2007 (Approximate)   SpO2 95%   Diagnostics:    X-ray: XR left ankle- Calcaneal spur     Alejandra is a 57 y/o female evaluated today for left foot pain. Ongoing chronic left foot pain. She follows with orthopedics and has a scheduled US tomorrow. Recent left foot x-ray (10/24/24) reveals no acute bony abnormality. There is mild spur formation a thte insertion site of the Achilles tendon, similar to prior exam. Left ankle x-ray reveals calcaneal spur. Her symptoms improved with Toradol and topical capsicin cream. Discussed applying a thin layer to the bottom of left foot as  needed for pain. Follow up with ultrasound as scheduled tomorrow in [place]. Discussed return to ED precautions. Verbalizes understanding of discharge plan.      I have reviewed the nursing notes.    I have reviewed the findings, diagnosis, plan and need for follow up with the patient.  Medical Decision Making  The patient's presentation was of moderate complexity (a chronic illness mild to moderate exacerbation, progression, or side effect of treatment).    The patient's evaluation involved:  an assessment requiring an independent historian (see separate area of note for details)  ordering and/or review of 1 test(s) in this encounter (see separate area of note for details)    The patient's management necessitated moderate risk (prescription drug management including medications given in the ED).    Final diagnoses:   Chronic foot pain, left   Calcaneal spur of foot, left     11/4/2024   [facility], AMARILIS CNP  11/04/24 1602     No

## 2024-11-04 NOTE — DISCHARGE INSTRUCTIONS
Your left ankle x-ray reveals calcaneal (heel) spur. Follow up with ultrasound as scheduled tomorrow. Tylenol and ibuprofen as needed for pain. Capsaicin cream 2-3 times a day as needed to the bottom of your left foot for pain. Apply a thin layer. Elevate your left foot. Follow up with orthopedics.     Please return to the emergency room if you experience worsening pain, redness or swelling, fever or any new concerns.

## 2024-11-05 ENCOUNTER — PATIENT OUTREACH (OUTPATIENT)
Dept: CARE COORDINATION | Facility: CLINIC | Age: 59
End: 2024-11-05

## 2024-11-06 DIAGNOSIS — M79.605 PAIN OF LEFT LOWER EXTREMITY: ICD-10-CM

## 2024-11-07 RX ORDER — CYCLOBENZAPRINE HCL 10 MG
10 TABLET ORAL 2 TIMES DAILY PRN
Qty: 30 TABLET | Refills: 0 | OUTPATIENT
Start: 2024-11-07

## 2024-11-07 NOTE — TELEPHONE ENCOUNTER
[id number] sent Rx request for the following:      Requested Prescriptions   Pending Prescriptions Disp Refills    cyclobenzaprine (FLEXERIL) 10 MG tablet 30 tablet 0     Sig: Take 1 tablet (10 mg) by mouth 2 times daily as needed for muscle spasms.       There is no refill protocol information for this order          Last Prescription Date:   9/23/24  Last Fill Qty/Refills:         30, R-0      PCP:  Norma Gonzales [place].  Pharmacy Notified.    Kristen Quinn RN on 11/7/2024 at 10:06 AM

## 2024-11-11 DIAGNOSIS — M79.605 PAIN OF LEFT LOWER EXTREMITY: ICD-10-CM

## 2024-11-13 RX ORDER — CYCLOBENZAPRINE HCL 10 MG
10 TABLET ORAL 2 TIMES DAILY PRN
Qty: 30 TABLET | Refills: 0 | OUTPATIENT
Start: 2024-11-13

## 2024-11-13 NOTE — TELEPHONE ENCOUNTER
CYCLOBENZAPRINE 10MG TABLET   Medication orginally ordered by [facility] provider.  Alerted pharmacy of PCP and requested for them to send request to [facility] in [facility]. Unable to complete prescription refill per RNMedication Refill Policy.................... Gena Torres RN ....................  11/13/2024   7:16 AM

## 2024-11-13 NOTE — PROGRESS NOTES
"Clinic Care Coordination Contact  Follow Up Progress Note      Assessment: Pain in patient's foot has been limiting her daily. She states she has been \"pushing through at work\" where she has to stand all day. ([employer] Don[employer]) She does state she now has her own house. No insecurity with housing, food or basic needs now.     Care Gaps:    Health Maintenance Due   Topic Date Due    SPIROMETRY  Never done    COPD ACTION PLAN  Never done    EYE EXAM  Never done    COLORECTAL CANCER SCREENING  Never done    HIV SCREENING  Never done    MEDICARE ANNUAL WELLNESS VISIT  Never done    HEPATITIS B IMMUNIZATION (1 of 3 - 19+ 3-dose series) Never done    PAP  Never done    Pneumococcal Vaccine: Pediatrics (0 to 5 Years) and At-Risk Patients (6 to 64 Years) (2 of 2 - PCV) 09/26/2008    ZOSTER IMMUNIZATION (1 of 2) Never done    LUNG CANCER SCREENING  Never done    MAMMO SCREENING  09/11/2019    LIPID  03/30/2023    MICROALBUMIN  03/30/2023    DIABETIC FOOT EXAM  03/31/2023    A1C  02/04/2024    DTAP/TDAP/TD IMMUNIZATION (2 - Td or Tdap) 02/20/2024    BMP  08/04/2024    COVID-19 Vaccine (3 - 2024-25 season) 09/01/2024    NICOTINE/TOBACCO CESSATION COUNSELING Q 1 YR  10/09/2024     Intervention/Education provided during outreach: Goals with housing were met. Surgeon visit is tomorrow to develop a plan for foot. Encouraged her to discuss pain then.      Plan:   Patient is seeing ortho surgeon tomorrow to discuss pain with foot and a plan for treatment. Otherwise she feels more stable in her life than before and has no further care coordination needs.     Care Coordinator will graduate from panel at this time. Encouraged her to call anytime with changes or questions.   Consuelo Lozano RN on 11/13/2024 at 11:06 AM    "

## 2024-11-14 ENCOUNTER — OFFICE VISIT (OUTPATIENT)
Dept: ORTHOPEDICS | Facility: OTHER | Age: 59
End: 2024-11-14
Attending: PODIATRIST

## 2024-11-14 DIAGNOSIS — M79.672 LEFT FOOT PAIN: ICD-10-CM

## 2024-11-14 DIAGNOSIS — M24.573 EQUINUS CONTRACTURE OF ANKLE: ICD-10-CM

## 2024-11-14 DIAGNOSIS — M79.671 RIGHT FOOT PAIN: Primary | ICD-10-CM

## 2024-11-14 RX ORDER — KETOROLAC TROMETHAMINE 10 MG/1
10 TABLET, FILM COATED ORAL EVERY 6 HOURS PRN
Qty: 20 TABLET | Refills: 0 | Status: SHIPPED | OUTPATIENT
Start: 2024-11-14

## 2024-11-14 NOTE — PROGRESS NOTES
SUBJECTIVE:  Joseph is here for ongoing left foot pain she has a plantar fibroma which is quite painful she has Achilles tendon pain significant equinus has been noted previously and continues to have this.     ROS: Musculoskeletal and general review of systems are negative, per review of previous clinic questionnaire.  Denies SOB and calf pain.    EXAM:   PHYSICAL EXAMINATION:   CONSTITUTIONAL:  The patient is alert and oriented x 3, well appearing and in no apparent distress.  Affect is pleasant and answers questions appropriately.  VASCULAR:  Circulation is intact with palpable pedal pulses and adequate capillary fill time to all digits.  Hair growth is present and appropriate to mid foot and digits. Calf nontender.  NEUROLOGIC:  Light touch sensation is intact to digits.  There is a negative Tinel sign.    INTEGUMENT:  No abnormal dermatologic lesions are noted.  Skin has normal texture and turgor.    MUSCULOSKELETAL: Unchanged    IMAGING: Ultrasound reviewed with patient has significant plantar fibroma which seems to be a significant source of pain.    ASSESSMENT: Painful left foot plantar fibroma, equinus Achilles tendinitis    PLAN OF CARE: Discussed condition and treatment with patient today.  Will plan on a plantar fibroma resection she will schedule this in addition to a gastroc recession the due to significant equinus and fairly diffuse symptoms associated with this.  She will schedule and follow-up accordingly postop.  This was discussed in detail she will get a preop and follow-up.    Rl Nathan DPM

## 2024-12-09 ENCOUNTER — OFFICE VISIT (OUTPATIENT)
Dept: FAMILY MEDICINE | Facility: OTHER | Age: 59
End: 2024-12-09
Attending: STUDENT IN AN ORGANIZED HEALTH CARE EDUCATION/TRAINING PROGRAM
Payer: MEDICARE

## 2024-12-09 ENCOUNTER — TELEPHONE (OUTPATIENT)
Dept: FAMILY MEDICINE | Facility: OTHER | Age: 59
End: 2024-12-09

## 2024-12-09 VITALS
TEMPERATURE: 98 F | OXYGEN SATURATION: 94 % | HEART RATE: 76 BPM | HEIGHT: 64 IN | WEIGHT: 179 LBS | SYSTOLIC BLOOD PRESSURE: 140 MMHG | DIASTOLIC BLOOD PRESSURE: 82 MMHG | BODY MASS INDEX: 30.56 KG/M2 | RESPIRATION RATE: 20 BRPM

## 2024-12-09 DIAGNOSIS — E78.2 MIXED HYPERLIPIDEMIA: ICD-10-CM

## 2024-12-09 DIAGNOSIS — I10 ESSENTIAL HYPERTENSION: ICD-10-CM

## 2024-12-09 DIAGNOSIS — Z72.0 TOBACCO USER: ICD-10-CM

## 2024-12-09 DIAGNOSIS — Z12.11 SCREEN FOR COLON CANCER: ICD-10-CM

## 2024-12-09 DIAGNOSIS — R73.03 PREDIABETES: ICD-10-CM

## 2024-12-09 DIAGNOSIS — M79.605 PAIN OF LEFT LOWER EXTREMITY: ICD-10-CM

## 2024-12-09 DIAGNOSIS — E55.9 VITAMIN D DEFICIENCY: ICD-10-CM

## 2024-12-09 DIAGNOSIS — E66.811 CLASS 1 OBESITY WITH SERIOUS COMORBIDITY AND BODY MASS INDEX (BMI) OF 30.0 TO 30.9 IN ADULT, UNSPECIFIED OBESITY TYPE: ICD-10-CM

## 2024-12-09 DIAGNOSIS — G43.009 MIGRAINE WITHOUT AURA AND WITHOUT STATUS MIGRAINOSUS, NOT INTRACTABLE: ICD-10-CM

## 2024-12-09 DIAGNOSIS — J44.9 CHRONIC OBSTRUCTIVE PULMONARY DISEASE, UNSPECIFIED COPD TYPE (H): ICD-10-CM

## 2024-12-09 DIAGNOSIS — Z12.31 ENCOUNTER FOR SCREENING MAMMOGRAM FOR BREAST CANCER: ICD-10-CM

## 2024-12-09 DIAGNOSIS — F39 MOOD DISORDER (H): ICD-10-CM

## 2024-12-09 DIAGNOSIS — Z01.818 PREOPERATIVE EXAMINATION: Primary | ICD-10-CM

## 2024-12-09 DIAGNOSIS — H69.93 DYSFUNCTION OF BOTH EUSTACHIAN TUBES: ICD-10-CM

## 2024-12-09 DIAGNOSIS — R79.89 PRERENAL AZOTEMIA: ICD-10-CM

## 2024-12-09 DIAGNOSIS — J31.0 RHINITIS, UNSPECIFIED TYPE: ICD-10-CM

## 2024-12-09 DIAGNOSIS — Z12.12 SCREENING FOR MALIGNANT NEOPLASM OF THE RECTUM: ICD-10-CM

## 2024-12-09 DIAGNOSIS — N18.2 CKD (CHRONIC KIDNEY DISEASE) STAGE 2, GFR 60-89 ML/MIN: ICD-10-CM

## 2024-12-09 DIAGNOSIS — F17.218 CIGARETTE NICOTINE DEPENDENCE WITH OTHER NICOTINE-INDUCED DISORDER: ICD-10-CM

## 2024-12-09 LAB
ALBUMIN SERPL BCG-MCNC: 4.5 G/DL (ref 3.5–5.2)
ALP SERPL-CCNC: 97 U/L (ref 40–150)
ALT SERPL W P-5'-P-CCNC: 19 U/L (ref 0–50)
ANION GAP SERPL CALCULATED.3IONS-SCNC: 7 MMOL/L (ref 7–15)
AST SERPL W P-5'-P-CCNC: 19 U/L (ref 0–45)
ATRIAL RATE - MUSE: 70 BPM
BASOPHILS # BLD AUTO: 0 10E3/UL (ref 0–0.2)
BASOPHILS NFR BLD AUTO: 1 %
BILIRUB SERPL-MCNC: 0.5 MG/DL
BUN SERPL-MCNC: 16.8 MG/DL (ref 6–20)
CALCIUM SERPL-MCNC: 9.2 MG/DL (ref 8.8–10.4)
CHLORIDE SERPL-SCNC: 107 MMOL/L (ref 98–107)
CHOLEST SERPL-MCNC: 160 MG/DL
CREAT SERPL-MCNC: 0.76 MG/DL (ref 0.51–0.95)
DIASTOLIC BLOOD PRESSURE - MUSE: NORMAL MMHG
EGFRCR SERPLBLD CKD-EPI 2021: >90 ML/MIN/1.73M2
EOSINOPHIL # BLD AUTO: 0 10E3/UL (ref 0–0.7)
EOSINOPHIL NFR BLD AUTO: 2 %
ERYTHROCYTE [DISTWIDTH] IN BLOOD BY AUTOMATED COUNT: 16.1 % (ref 10–15)
EST. AVERAGE GLUCOSE BLD GHB EST-MCNC: 126 MG/DL
FASTING STATUS PATIENT QL REPORTED: YES
FASTING STATUS PATIENT QL REPORTED: YES
GLUCOSE SERPL-MCNC: 114 MG/DL (ref 70–99)
HBA1C MFR BLD: 6 %
HCO3 SERPL-SCNC: 25 MMOL/L (ref 22–29)
HCT VFR BLD AUTO: 34.4 % (ref 35–47)
HDLC SERPL-MCNC: 47 MG/DL
HGB BLD-MCNC: 12 G/DL (ref 11.7–15.7)
IMM GRANULOCYTES # BLD: 0 10E3/UL
IMM GRANULOCYTES NFR BLD: 0 %
INTERPRETATION ECG - MUSE: NORMAL
LDLC SERPL CALC-MCNC: 82 MG/DL
LYMPHOCYTES # BLD AUTO: 1.8 10E3/UL (ref 0.8–5.3)
LYMPHOCYTES NFR BLD AUTO: 76 %
MCH RBC QN AUTO: 39 PG (ref 26.5–33)
MCHC RBC AUTO-ENTMCNC: 34.9 G/DL (ref 31.5–36.5)
MCV RBC AUTO: 112 FL (ref 78–100)
MONOCYTES # BLD AUTO: 0.2 10E3/UL (ref 0–1.3)
MONOCYTES NFR BLD AUTO: 10 %
NEUTROPHILS # BLD AUTO: 0.3 10E3/UL (ref 1.6–8.3)
NEUTROPHILS NFR BLD AUTO: 11 %
NONHDLC SERPL-MCNC: 113 MG/DL
NRBC # BLD AUTO: 0 10E3/UL
NRBC BLD AUTO-RTO: 0 /100
P AXIS - MUSE: 58 DEGREES
PLATELET # BLD AUTO: 197 10E3/UL (ref 150–450)
POTASSIUM SERPL-SCNC: 4.1 MMOL/L (ref 3.4–5.3)
PR INTERVAL - MUSE: 134 MS
PROT SERPL-MCNC: 7.6 G/DL (ref 6.4–8.3)
QRS DURATION - MUSE: 66 MS
QT - MUSE: 406 MS
QTC - MUSE: 438 MS
R AXIS - MUSE: 2 DEGREES
RBC # BLD AUTO: 3.08 10E6/UL (ref 3.8–5.2)
SODIUM SERPL-SCNC: 139 MMOL/L (ref 135–145)
SYSTOLIC BLOOD PRESSURE - MUSE: NORMAL MMHG
T AXIS - MUSE: 15 DEGREES
TRIGL SERPL-MCNC: 156 MG/DL
VENTRICULAR RATE- MUSE: 70 BPM
WBC # BLD AUTO: 2.4 10E3/UL (ref 4–11)

## 2024-12-09 PROCEDURE — 84520 ASSAY OF UREA NITROGEN: CPT | Mod: ZL | Performed by: STUDENT IN AN ORGANIZED HEALTH CARE EDUCATION/TRAINING PROGRAM

## 2024-12-09 PROCEDURE — 93010 ELECTROCARDIOGRAM REPORT: CPT | Performed by: INTERNAL MEDICINE

## 2024-12-09 PROCEDURE — 99406 BEHAV CHNG SMOKING 3-10 MIN: CPT | Performed by: STUDENT IN AN ORGANIZED HEALTH CARE EDUCATION/TRAINING PROGRAM

## 2024-12-09 PROCEDURE — 80061 LIPID PANEL: CPT | Mod: ZL | Performed by: STUDENT IN AN ORGANIZED HEALTH CARE EDUCATION/TRAINING PROGRAM

## 2024-12-09 PROCEDURE — 85025 COMPLETE CBC W/AUTO DIFF WBC: CPT | Mod: ZL | Performed by: STUDENT IN AN ORGANIZED HEALTH CARE EDUCATION/TRAINING PROGRAM

## 2024-12-09 PROCEDURE — 84155 ASSAY OF PROTEIN SERUM: CPT | Mod: ZL | Performed by: STUDENT IN AN ORGANIZED HEALTH CARE EDUCATION/TRAINING PROGRAM

## 2024-12-09 PROCEDURE — 84450 TRANSFERASE (AST) (SGOT): CPT | Mod: ZL | Performed by: STUDENT IN AN ORGANIZED HEALTH CARE EDUCATION/TRAINING PROGRAM

## 2024-12-09 PROCEDURE — G0463 HOSPITAL OUTPT CLINIC VISIT: HCPCS | Mod: 25

## 2024-12-09 PROCEDURE — 93005 ELECTROCARDIOGRAM TRACING: CPT | Performed by: STUDENT IN AN ORGANIZED HEALTH CARE EDUCATION/TRAINING PROGRAM

## 2024-12-09 PROCEDURE — 96372 THER/PROPH/DIAG INJ SC/IM: CPT | Performed by: STUDENT IN AN ORGANIZED HEALTH CARE EDUCATION/TRAINING PROGRAM

## 2024-12-09 PROCEDURE — 83036 HEMOGLOBIN GLYCOSYLATED A1C: CPT | Mod: ZL | Performed by: STUDENT IN AN ORGANIZED HEALTH CARE EDUCATION/TRAINING PROGRAM

## 2024-12-09 PROCEDURE — 36415 COLL VENOUS BLD VENIPUNCTURE: CPT | Mod: ZL | Performed by: STUDENT IN AN ORGANIZED HEALTH CARE EDUCATION/TRAINING PROGRAM

## 2024-12-09 PROCEDURE — 250N000011 HC RX IP 250 OP 636: Mod: JZ | Performed by: STUDENT IN AN ORGANIZED HEALTH CARE EDUCATION/TRAINING PROGRAM

## 2024-12-09 RX ORDER — DEXAMETHASONE SODIUM PHOSPHATE 4 MG/ML
4 INJECTION, SOLUTION INTRA-ARTICULAR; INTRALESIONAL; INTRAMUSCULAR; INTRAVENOUS; SOFT TISSUE ONCE
Status: COMPLETED | OUTPATIENT
Start: 2024-12-09 | End: 2024-12-09

## 2024-12-09 RX ORDER — CYCLOBENZAPRINE HCL 10 MG
10 TABLET ORAL AT BEDTIME
Qty: 30 TABLET | Refills: 0 | Status: SHIPPED | OUTPATIENT
Start: 2024-12-09

## 2024-12-09 RX ORDER — FAMOTIDINE 20 MG
1000 TABLET ORAL DAILY
Qty: 90 CAPSULE | Refills: 1 | Status: SHIPPED | OUTPATIENT
Start: 2024-12-09

## 2024-12-09 RX ORDER — FLUTICASONE PROPIONATE 50 MCG
2 SPRAY, SUSPENSION (ML) NASAL 2 TIMES DAILY
Qty: 15.8 ML | Refills: 0 | Status: SHIPPED | OUTPATIENT
Start: 2024-12-09

## 2024-12-09 RX ADMIN — DEXAMETHASONE SODIUM PHOSPHATE 4 MG: 4 INJECTION, SOLUTION INTRAMUSCULAR; INTRAVENOUS at 15:20

## 2024-12-09 ASSESSMENT — ANXIETY QUESTIONNAIRES
GAD7 TOTAL SCORE: 0
6. BECOMING EASILY ANNOYED OR IRRITABLE: NOT AT ALL
GAD7 TOTAL SCORE: 0
5. BEING SO RESTLESS THAT IT IS HARD TO SIT STILL: NOT AT ALL
3. WORRYING TOO MUCH ABOUT DIFFERENT THINGS: NOT AT ALL
1. FEELING NERVOUS, ANXIOUS, OR ON EDGE: NOT AT ALL
7. FEELING AFRAID AS IF SOMETHING AWFUL MIGHT HAPPEN: NOT AT ALL
8. IF YOU CHECKED OFF ANY PROBLEMS, HOW DIFFICULT HAVE THESE MADE IT FOR YOU TO DO YOUR WORK, TAKE CARE OF THINGS AT HOME, OR GET ALONG WITH OTHER PEOPLE?: NOT DIFFICULT AT ALL
2. NOT BEING ABLE TO STOP OR CONTROL WORRYING: NOT AT ALL
7. FEELING AFRAID AS IF SOMETHING AWFUL MIGHT HAPPEN: NOT AT ALL
IF YOU CHECKED OFF ANY PROBLEMS ON THIS QUESTIONNAIRE, HOW DIFFICULT HAVE THESE PROBLEMS MADE IT FOR YOU TO DO YOUR WORK, TAKE CARE OF THINGS AT HOME, OR GET ALONG WITH OTHER PEOPLE: NOT DIFFICULT AT ALL
GAD7 TOTAL SCORE: 0
4. TROUBLE RELAXING: NOT AT ALL

## 2024-12-09 ASSESSMENT — PAIN SCALES - GENERAL: PAINLEVEL_OUTOF10: EXTREME PAIN (9)

## 2024-12-09 NOTE — H&P (VIEW-ONLY)
Preoperative Evaluation  [facility] AND [facility]  [address]  [place] [address]  Phone: [phone]  Fax: [phone]  Primary Provider: Norma Shah  Pre-op Performing Provider: Ang Gilman DO  Dec 9, 2024             12/9/2024   Surgical Information   What procedure is being done? left foot    Facility or Hospital where procedure/surgery will be performed: hospital    Who is doing the procedure / surgery? ranking    Date of surgery / procedure: 12/19/24    Time of surgery / procedure: i dont know yet    Where do you plan to recover after surgery? at home with family        Patient-reported     Fax number for surgical facility: Note does not need to be faxed, will be available electronically in Epic.    Assessment & Plan     The proposed surgical procedure is considered INTERMEDIATE risk.    (Z01.818) Preoperative examination  (primary encounter diagnosis)  Comment: cleared for proposed surgery  Plan: CBC and Differential, Comprehensive metabolic         panel, Hemoglobin A1c, EKG 12-lead, tracing         only (Same Day)    (M79.605) Pain of left lower extremity  Comment: soft tissue mass to be excised  Plan: cyclobenzaprine (FLEXERIL) 10 MG tablet    (H69.93) Dysfunction of both eustachian tubes  (J31.0) Rhinitis, unspecified type  Comment: completed course of abx on 11/29/24 for left ear infection. I spoke with ortho to check if steroid injection today would interfere with surgery - cleared.  Plan: fluticasone (FLONASE) 50 MCG/ACT nasal spray,         dexAMETHasone (DECADRON) injection 4 mg    (J31.0) Rhinitis, unspecified type  Comment:   Plan: fluticasone (FLONASE) 50 MCG/ACT nasal spray,         dexAMETHasone (DECADRON) injection 4 mg    (J44.9) Chronic obstructive pulmonary disease, unspecified COPD type (H)  Comment: albuterol and symbicort inhalers, DuoNeb    (R73.03) Prediabetes  Comment: advised weight loss, consider metformin after surgery  Plan: Hemoglobin A1c    (E78.2)  Mixed hyperlipidemia  Comment: atorvastatin  Plan: Hemoglobin A1c, Lipid Panel    (I10) Essential hypertension  Comment: lisinopril  Plan: CBC and Differential, Comprehensive metabolic panel    (N18.2) CKD (chronic kidney disease) stage 2, GFR 60-89 ml/min  (R79.89) Prerenal azotemia  Comment: Hydrate and avoid nephrotoxins  Plan: CBC and Differential, Comprehensive metabolic panel    (E55.9) Vitamin D deficiency  Plan: Vitamin D, Cholecalciferol, 25 MCG (1000 UT) CAPS    (F39) Mood disorder (H)  Comment: anxiety and grief; on paroxetine    (G43.009) Migraine without aura and without status migrainosus, not intractable  Comment: sumatriptan and cyclobenzaprine; may need to try nurtec    (Z12.11) Screen for colon cancer  (Z12.12) Screening for malignant neoplasm of the rectum  Plan: COLOGUARD([employer])    (Z12.31) Encounter for screening mammogram for breast cancer  Plan: MA Screening Bilateral w/ Jabari    (F17.218) Cigarette nicotine dependence with other nicotine-induced disorder    (Z72.0) Tobacco user  Comment: Smoked 1 PPD for 44 years; current smoker.  Counseled quitting.  Advise low-dose CT to screen for lung cancer but patient deferred at this time.  Plan: Smoking Cessation Counseling 3-10 minutes    (E66.811,  Z68.30) Class 1 obesity with serious comorbidity and body mass index (BMI) of 30.0 to 30.9 in adult, unspecified obesity type  Comment: encouraged weight loss with healthy diet and gradual increase in activity after surgery               - No identified additional risk factors other than previously addressed    Preoperative Medication Instructions  Antiplatelet or Anticoagulation Medication Instructions   - aspirin: Discontinue aspirin 7-10 days prior to procedure to reduce bleeding risk. It should be resumed postoperatively.     Additional Medication Instructions   - ACE/ARB: DO NOT TAKE on day of surgery (minimum 11 hours for general anesthesia).   - Statins: Continue taking on the day of  surgery.    - metformin: DO NOT TAKE day of surgery.   - Herbal medications and vitamins: DO NOT TAKE 14 days prior to surgery.   - pregabalin, gabapentin: Continue without modification.   - triptans, migraine abortives: DO NOT TAKE on day of surgery   - ibuprofen (Advil, Motrin): DO NOT TAKE 1 day before surgery.    - ketorolac (Toradol): DO NOT TAKE 1 day before surgery.    - naproxen (Aleve, Naprosyn): DO NOT TAKE 4 days before surgery.    - SSRIs, SNRIs, TCAs, Antipsychotics: Continue without modification.    - LABA, inhaled corticosteroid, long-acting anticholinergics: Continue without modification.   - rescue Inhaler: Continue PRN. Bring to hospital on the day of surgery.   - Topicals: DO NOT TAKE day of surgery.    Recommendation  Approval given to proceed with proposed procedure, without further diagnostic evaluation.    Seda Roberts is a 58 year old, presenting for the following:  Pre-Op Exam          12/9/2024     2:03 PM   Additional Questions   Roomed by Consuelo KILPATRICK LPN     HPI related to upcoming procedure: 58-year-old female with COPD, prediabetes, HLD, HTN, CKD, vitamin D deficiency, mood disorder, migraines, and recent ear infection who presents for preop evaluation.  History of tachycardia; SVT briefly seen on Zio patch but all EKGs since have been normal and patient denies any recent/current palpitations or racing heartbeat.  Takes medications regularly for her chronic conditions.  Completed 1 week course of Omnicef orally and ofloxacin otic drops for left ear infection at the end of November.  C/o decreased hearing and ear fullness still.    Also due for mammogram and colon cancer screening; patient requesting to establish with me as her primary care as her previous PCP recently switched to only inpatient.        12/9/2024   Pre-Op Questionnaire   Have you ever had a heart attack or stroke? No    Have you ever had surgery on your heart or blood vessels, such as a stent placement, a  coronary artery bypass, or surgery on an artery in your head, neck, heart, or legs? No    Do you have chest pain with activity? No    Do you have a history of heart failure? No    Do you currently have a cold, bronchitis or symptoms of other infection? No    Do you have a cough, shortness of breath, or wheezing? No    Do you or anyone in your family have previous history of blood clots? No    Do you or does anyone in your family have a serious bleeding problem such as prolonged bleeding following surgeries or cuts? No    Have you ever had problems with anemia or been told to take iron pills? No    Have you had any abnormal blood loss such as black, tarry or bloody stools, or abnormal vaginal bleeding? No    Have you ever had a blood transfusion? No    Are you willing to have a blood transfusion if it is medically needed before, during, or after your surgery? Yes    Have you or any of your relatives ever had problems with anesthesia? No    Do you have sleep apnea, excessive snoring or daytime drowsiness? No    Do you have any artifical heart valves or other implanted medical devices like a pacemaker, defibrillator, or continuous glucose monitor? No    Do you have artificial joints? No    Are you allergic to latex? (!) YES        Patient-reported     Health Care Directive  Patient does not have a Health Care Directive: Discussed advance care planning with patient; information given to patient to review.    Preoperative Review of    reviewed - controlled substances reflected in medication list.      PDMP Review         Value Time User    State PDMP site checked  Yes 8/4/2023  9:31 AM Angella Darnell APRN CNP                  Patient Active Problem List    Diagnosis Date Noted    Pulmonary emphysema, unspecified emphysema type (H) 01/23/2024     Priority: Medium    Benign essential hypertension 01/23/2024     Priority: Medium    Mixed hyperlipidemia 01/23/2024     Priority: Medium    Pain in joint, ankle and  foot, left 10/05/2022     Priority: Medium    CKD (chronic kidney disease) stage 2, GFR 60-89 ml/min 03/31/2022     Priority: Medium    Osteopenia of right foot 02/03/2021     Priority: Medium    Anxiety state 01/30/2018     Priority: Medium    Other isolated or specific phobias 01/30/2018     Priority: Medium    Degenerative disc disease at L5-S1 level 01/30/2018     Priority: Medium    Hidradenitis suppurativa 01/30/2018     Priority: Medium    Tobacco use disorder 01/30/2018     Priority: Medium    Gastroesophageal reflux disease 01/18/2017     Priority: Medium    Pain management contract broken 06/05/2015     Priority: Medium     Overview:   Contract violation - see 12/1/15 letter.      Urge incontinence 06/04/2014     Priority: Medium    Alopecia areata 02/21/2014     Priority: Medium    Benign paroxysmal positional vertigo 02/28/2013     Priority: Medium    Ovarian cyst, left 05/09/2012     Priority: Medium    Other acquired deformity of ankle and foot(736.79) 05/09/2012     Priority: Medium    RA (rheumatoid arthritis) (H) 05/09/2012     Priority: Medium     Overview:   Diagnosed [employer] 2012      Asthma 05/04/2012     Priority: Medium    Seasonal allergic rhinitis 09/27/2011     Priority: Medium    Symptomatic menopausal or female climacteric states 09/21/2010     Priority: Medium    Other diseases of lung, not elsewhere classified 08/01/2007     Priority: Medium      Past Medical History:   Diagnosis Date    Allergic rhinitis 09/27/2011         Disorder of kidney and ureter 08/08/2008    Kidney disease GFR 87    Dorsalgia     No Comments Provided    Generalized anxiety disorder     No Comments Provided    Hidradenitis suppurativa     No Comments Provided    Other disorders of lung (CODE) 08/01/2007    Pulmonary nodules, stable on follow-up chest CT 12/08.  No further imaging recommended.    Other specified disorders of Eustachian tube, unspecified ear 02/27/2012         Personal history of other  medical treatment (CODE)     Childbirth x4    Rheumatoid arthritis (H) 02/27/2012         Tobacco use     No Comments Provided     Past Surgical History:   Procedure Laterality Date    ARTHROSCOPY KNEE  05/2017    Dr Torres    ARTHROSCOPY KNEE  07/20/2017    Dr Garza, lateral release, meniscal repair    ARTHROTOMY WRIST Left 2011    Dr. Torres    CHOLECYSTECTOMY  06/2007    Emergency tracheotomy following failed intubation for laparoscopic cholecystectomy.    DILATION AND CURETTAGE  09/2006         HERNIA REPAIR  2007    Incisoinal hernia repair    HYSTERECTOMY TOTAL ABDOMINAL  02/2010         IMPLANT STIMULATOR AND LEADS SACRAL NERVE (STAGE ONE AND TWO) N/A 12/11/2018    Procedure: Interstim Battery Replacement;  Surgeon: Rl Ambriz MD;  Location: [facility] OR    INTERSTIM DEVICE STAGE 2  01/06/2014    Dr. Ambriz - [facility]    LAPAROSCOPIC ABLATION ENDOMETRIOSIS  09/2006         OOPHORECTOMY Left 2010     Dr Thorpe    RECONSTRUCT FOREFOOT WITH METATARSOPHALANGEAL (MTP) FUSION Right 5/5/2022    Procedure: Right fibular sesmoid excision and 1st metatarsal phalangeal joint fusion;  Surgeon: Rl Nathan DPM;  Location: [facility] OR    RELEASE CARPAL TUNNEL  02/02/2017         REMOVE HARDWARE FOOT Right 8/10/2023    Procedure: REMOVAL, HARDWARE, FOOT;  Surgeon: Rl Nathan DPM;  Location: [facility] OR    SALPINGO OOPHORECTOMY,R/L/KELSEY Right 06/1999    Right salpingo-oophorectomy for hemorrhagic corpus luteum cyst    TRACHEOSTOMY  2007    emergency following failed intubation during cholecystectomy    TYMPANOSTOMY, LOCAL/TOPICAL ANESTHESIA  08/2006    PE tube placement     Current Outpatient Medications   Medication Sig Dispense Refill    albuterol (PROAIR HFA/PROVENTIL HFA/VENTOLIN HFA) 108 (90 Base) MCG/ACT inhaler Inhale 1-2 puffs into the lungs every 4 hours as needed for shortness of breath, wheezing or cough 18 g 4    aspirin 81 MG EC tablet Take 1 tablet (81 mg) by mouth 2 times daily 60 tablet 0    atorvastatin (LIPITOR) 40  MG tablet Take 1 tablet (40 mg) by mouth at bedtime 90 tablet 4    budesonide-formoterol (SYMBICORT) 80-4.5 MCG/ACT Inhaler Inhale 2 puffs into the lungs 2 times daily - Rinse mouth well after use to prevent Thrush 10.2 g 11    cyclobenzaprine (FLEXERIL) 10 MG tablet Take 1 tablet (10 mg) by mouth at bedtime. 30 tablet 0    fluticasone (FLONASE) 50 MCG/ACT nasal spray Spray 2 sprays into both nostrils 2 times daily. 15.8 mL 0    gabapentin (NEURONTIN) 400 MG capsule Take 2 capsules (800 mg) by mouth 2 times daily 360 capsule 4    ibuprofen (ADVIL/MOTRIN) 800 MG tablet TAKE 1 TABLET BY MOUTH THREE TIMES DAILY AS NEEDED 90 tablet 0    ipratropium - albuterol 0.5 mg/2.5 mg/3 mL (DUONEB) 0.5-2.5 (3) MG/3ML neb solution Take 1 vial (3 mLs) by nebulization every 4 hours as needed for shortness of breath, wheezing or cough 90 mL 11    ketorolac (TORADOL) 10 MG tablet Take 1 tablet (10 mg) by mouth every 6 hours as needed for moderate pain. 20 tablet 0    lisinopril (ZESTRIL) 10 MG tablet Take 1 tablet (10 mg) by mouth daily 90 tablet 4    order for DME Equipment being ordered: home neb set up with mask and tubing 1 Device 0    PARoxetine (PAXIL) 20 MG tablet Take 1 tablet (20 mg) by mouth every morning 90 tablet 4    SM PAIN RELIEVER 325 MG tablet       SUMAtriptan (IMITREX) 50 MG tablet Take 1 tablet (50 mg) by mouth at onset of headache for migraine May repeat in 2 hours. Max 4 tablets/24 hours. 9 tablet 1    Vitamin D, Cholecalciferol, 25 MCG (1000 UT) CAPS Take 1,000 Units by mouth daily. 90 capsule 1    valACYclovir (VALTREX) 1000 mg tablet Take 1 tablet (1,000 mg) by mouth 3 times daily for 7 days. 21 tablet 0       Allergies   Allergen Reactions    Adhesive Tape     Bee Pollen Swelling     Seasonal    Bee Venom Unknown    Folic Acid Itching    Meloxicam Unknown    Pollen Extract      Seasonal    Amoxicillin Rash    Liquid Adhesive Rash    Nabumetone GI Disturbance     GI upset        Social History     Tobacco Use  "   Smoking status: Every Day     Current packs/day: 1.00     Average packs/day: 1 pack/day for 44.0 years (44.0 ttl pk-yrs)     Types: Cigarettes     Start date: 1981    Smokeless tobacco: Never   Substance Use Topics    Alcohol use: No     Alcohol/week: 0.0 standard drinks of alcohol     Family History   Problem Relation Age of Onset    Arthritis Mother         Arthritis,Rheumatoid    Cancer Mother         Cancer, lung and uterine cancer    Heart Disease Father         Heart Disease,CAD, CABG, peripheral vascular dise    Thyroid Disease Father         Thyroid Disease, hyperlipidemia    Other - See Comments Father         djd    Asthma Brother         Asthma    Asthma Sister         Asthma    Other - See Comments Sister         Psychiatric illness,Anxiety    Other - See Comments Son         x2 back surgeries    Breast Cancer No family hx of         Cancer-breast     History   Drug Use No             Review of Systems  Constitutional, HEENT, cardiovascular, pulmonary, GI, , musculoskeletal, neuro, skin, endocrine and psych systems are negative, except as otherwise noted.    Objective    BP (!) 140/82 (BP Location: Right arm, Patient Position: Sitting, Cuff Size: Adult Large)   Pulse 76   Temp 98  F (36.7  C) (Temporal)   Resp 20   Ht 1.626 m (5' 4\")   Wt 81.2 kg (179 lb)   LMP 01/01/2007 (Approximate)   SpO2 94%   BMI 30.73 kg/m     Estimated body mass index is 30.73 kg/m  as calculated from the following:    Height as of this encounter: 1.626 m (5' 4\").    Weight as of this encounter: 81.2 kg (179 lb).    Physical Exam  Vitals and nursing note reviewed.   Constitutional:       General: She is not in acute distress.     Appearance: She is obese.   HENT:      Head: Normocephalic.      Right Ear: Ear canal normal. No middle ear effusion. Tympanic membrane is retracted. Tympanic membrane is not erythematous.      Left Ear: Ear canal normal.  No middle ear effusion. Tympanic membrane is retracted. Tympanic " "membrane is not erythematous.      Nose: Congestion present.      Mouth/Throat:      Mouth: Mucous membranes are moist.      Pharynx: Postnasal drip present. No posterior oropharyngeal erythema.   Cardiovascular:      Rate and Rhythm: Normal rate and regular rhythm.      Heart sounds: No murmur heard.     No friction rub. No gallop.   Pulmonary:      Effort: Pulmonary effort is normal.      Breath sounds: No wheezing, rhonchi or rales.   Abdominal:      General: Bowel sounds are normal.      Tenderness: There is no abdominal tenderness.   Musculoskeletal:         General: Tenderness (plantar aspect of left foot) present.      Right lower leg: No edema.      Left lower leg: No edema.   Skin:     General: Skin is warm.      Capillary Refill: Capillary refill takes less than 2 seconds.   Neurological:      Mental Status: She is alert.   Psychiatric:         Mood and Affect: Mood is anxious.             No results for input(s): \"HGB\", \"PLT\", \"INR\", \"NA\", \"POTASSIUM\", \"CR\", \"A1C\" in the last 8760 hours.     Diagnostics  No labs were ordered during this visit.   EKG required for hx of tachycardia/palpitations and not completed in the last 90 days.     Revised Cardiac Risk Index (RCRI)  The patient has the following serious cardiovascular risks for perioperative complications:   - No serious cardiac risks = 0 points     RCRI Interpretation: 0 points: Class I (very low risk - 0.4% complication rate)      Results for orders placed or performed in visit on 12/09/24   Comprehensive metabolic panel     Status: Abnormal   Result Value Ref Range    Sodium 139 135 - 145 mmol/L    Potassium 4.1 3.4 - 5.3 mmol/L    Carbon Dioxide (CO2) 25 22 - 29 mmol/L    Anion Gap 7 7 - 15 mmol/L    Urea Nitrogen 16.8 6.0 - 20.0 mg/dL    Creatinine 0.76 0.51 - 0.95 mg/dL    GFR Estimate >90 >60 mL/min/1.73m2    Calcium 9.2 8.8 - 10.4 mg/dL    Chloride 107 98 - 107 mmol/L    Glucose 114 (H) 70 - 99 mg/dL    Alkaline Phosphatase 97 40 - 150 U/L    " AST 19 0 - 45 U/L    ALT 19 0 - 50 U/L    Protein Total 7.6 6.4 - 8.3 g/dL    Albumin 4.5 3.5 - 5.2 g/dL    Bilirubin Total 0.5 <=1.2 mg/dL    Patient Fasting > 8hrs? Yes    Hemoglobin A1c     Status: Abnormal   Result Value Ref Range    Estimated Average Glucose 126 (H) <117 mg/dL    Hemoglobin A1C 6.0 (H) <5.7 %   Lipid Panel     Status: Abnormal   Result Value Ref Range    Cholesterol 160 <200 mg/dL    Triglycerides 156 (H) <150 mg/dL    Direct Measure HDL 47 (L) >=50 mg/dL    LDL Cholesterol Calculated 82 <100 mg/dL    Non HDL Cholesterol 113 <130 mg/dL    Patient Fasting > 8hrs? Yes     Narrative    Cholesterol  Desirable: < 200 mg/dL  Borderline High: 200 - 239 mg/dL  High: >= 240 mg/dL    Triglycerides  Normal: < 150 mg/dL  Borderline High: 150 - 199 mg/dL  High: 200-499 mg/dL  Very High: >= 500 mg/dL    Direct Measure HDL  Female: >= 50 mg/dL   Male: >= 40 mg/dL    LDL Cholesterol  Desirable: < 100 mg/dL  Above Desirable: 100 - 129 mg/dL   Borderline High: 130 - 159 mg/dL   High:  160 - 189 mg/dL   Very High: >= 190 mg/dL    Non HDL Cholesterol  Desirable: < 130 mg/dL  Above Desirable: 130 - 159 mg/dL  Borderline High: 160 - 189 mg/dL  High: 190 - 219 mg/dL  Very High: >= 220 mg/dL   CBC with platelets and differential     Status: Abnormal   Result Value Ref Range    WBC Count 2.4 (L) 4.0 - 11.0 10e3/uL    RBC Count 3.08 (L) 3.80 - 5.20 10e6/uL    Hemoglobin 12.0 11.7 - 15.7 g/dL    Hematocrit 34.4 (L) 35.0 - 47.0 %     (H) 78 - 100 fL    MCH 39.0 (H) 26.5 - 33.0 pg    MCHC 34.9 31.5 - 36.5 g/dL    RDW 16.1 (H) 10.0 - 15.0 %    Platelet Count 197 150 - 450 10e3/uL    % Neutrophils 11 %    % Lymphocytes 76 %    % Monocytes 10 %    % Eosinophils 2 %    % Basophils 1 %    % Immature Granulocytes 0 %    NRBCs per 100 WBC 0 <1 /100    Absolute Neutrophils 0.3 (LL) 1.6 - 8.3 10e3/uL    Absolute Lymphocytes 1.8 0.8 - 5.3 10e3/uL    Absolute Monocytes 0.2 0.0 - 1.3 10e3/uL    Absolute Eosinophils 0.0 0.0 -  0.7 10e3/uL    Absolute Basophils 0.0 0.0 - 0.2 10e3/uL    Absolute Immature Granulocytes 0.0 <=0.4 10e3/uL    Absolute NRBCs 0.0 10e3/uL   EKG 12-lead, tracing only (Same Day)     Status: None   Result Value Ref Range    Systolic Blood Pressure  mmHg    Diastolic Blood Pressure  mmHg    Ventricular Rate 70 BPM    Atrial Rate 70 BPM    CA Interval 134 ms    QRS Duration 66 ms     ms    QTc 438 ms    P Axis 58 degrees    R AXIS 2 degrees    T Axis 15 degrees    Interpretation ECG       Sinus rhythm  Normal ECG  When compared with ECG of 02-May-2022 15:25,  No significant change was found  Confirmed by DO CROWDER STACY ([id number]) on 12/13/2024 6:31:45 AM     CBC and Differential     Status: Abnormal    Narrative    The following orders were created for panel order CBC and Differential.  Procedure                               Abnormality         Status                     ---------                               -----------         ------                     CBC with platelets and d...[[id number]]  Abnormal            Final result               RBC and Platelet Morphology[[id number]]                                                   Please view results for these tests on the individual orders.     XR FOOT 3 VIEWS STANDING LEFT 10/24/2024 12:52 PM  HISTORY: Left foot pain  COMPARISONS: 7/27/2023.  TECHNIQUE: 3 views.  FINDINGS: No acute fracture or dislocation is seen. There is no focal  bone lesion.  There is mild spur formation at the insertion site of the Achilles  tendon, similar to the prior exam.  A few hammertoe deformities are seen.  IMPRESSION: No acute bony abnormality.      XR ANKLE LEFT G/E 3 VIEWS  Date: 11/4/24  HISTORY: left lateral ankle pain and swelling.  COMPARISON:  None.  TECHNIQUE:  3 views of the left ankle were obtained.  FINDINGS:  No fracture or dislocation is identified. The joint spaces  are preserved.  There is bony spurring at the insertion of the  Achilles tendon into the  calcaneus.  IMPRESSION: Calcaneal spur         Moderate medical decision making with acute and chronic conditions to be considered as well as preop evaluation, review of medical records including test ordered by other physicians which I interpreted and reviewed with patient, counseled, ordered test, and ordered medications.    I spent a total of 47 minutes on the day of the visit.  I spent 37 minutes face-to-face with patient including 3 minutes on nicotine cessation counseling.  Total time spent by me today doing chart review, history and exam, documentation and further activities per the note on day of encounter.    The longitudinal plan of care for the diagnosis(es)/condition(s) as documented were addressed during this visit. Due to the added complexity in care, I will continue to support Alejandra in the subsequent management and with ongoing continuity of care.    Signed Electronically by: Ang Gilman DO  A copy of this evaluation report is provided to the requesting physician.         Answers submitted by the patient for this visit:  Patient Health Questionnaire (G7) (Submitted on 12/9/2024)  BROOKS 7 TOTAL SCORE: 0

## 2024-12-09 NOTE — PATIENT INSTRUCTIONS
How to Take Your Medication Before Surgery  Preoperative Medication Instructions   Antiplatelet or Anticoagulation Medication Instructions   - aspirin: Discontinue aspirin 7-10 days prior to procedure to reduce bleeding risk. It should be resumed postoperatively.     Additional Medication Instructions   - ACE/ARB: DO NOT TAKE on day of surgery (minimum 11 hours for general anesthesia).   - Statins: Continue taking on the day of surgery.    - metformin: DO NOT TAKE day of surgery.   - Herbal medications and vitamins: DO NOT TAKE 14 days prior to surgery.   - pregabalin, gabapentin: Continue without modification.   - triptans, migraine abortives: DO NOT TAKE on day of surgery   - ibuprofen (Advil, Motrin): DO NOT TAKE 1 day before surgery.    - ketorolac (Toradol): DO NOT TAKE 1 day before surgery.    - naproxen (Aleve, Naprosyn): DO NOT TAKE 4 days before surgery.    - SSRIs, SNRIs, TCAs, Antipsychotics: Continue without modification.    - LABA, inhaled corticosteroid, long-acting anticholinergics: Continue without modification.   - rescue Inhaler: Continue PRN. Bring to hospital on the day of surgery.   - Topicals: DO NOT TAKE day of surgery.

## 2024-12-09 NOTE — PROGRESS NOTES
Preoperative Evaluation  [facility]  [address]  [place] [address]  Phone: [phone]  Fax: [phone]  Primary Provider: Norma Shah  Pre-op Performing Provider: Ang Gilman DO  Dec 9, 2024   {Provider  Link to PREOP SmartSet  REQUIRED to apply standard patient instructions and medication directions to the AVS :026970}  {ROOMER review and update patient entered surgical information if needed :500245}        12/9/2024   Surgical Information   What procedure is being done? left foot    Facility or Hospital where procedure/surgery will be performed: hospital    Who is doing the procedure / surgery? [facility]    Date of surgery / procedure: 830323    Time of surgery / procedure: i dont know yet    Where do you plan to recover after surgery? at home with family        Patient-reported     Fax number for surgical facility: Note does not need to be faxed, will be available electronically in Epic.    Assessment & Plan     The proposed surgical procedure is considered INTERMEDIATE risk.    (H69.93) Dysfunction of both eustachian tubes  (primary encounter diagnosis)  Comment: ***    (J31.0) Rhinitis, unspecified type  Comment: ***    (M79.605) Pain of left lower extremity  Comment: ***    (Z12.11) Screen for colon cancer  Comment: ***              - No identified additional risk factors other than previously addressed    Preoperative Medication Instructions  Antiplatelet or Anticoagulation Medication Instructions   - aspirin: Discontinue aspirin 7-10 days prior to procedure to reduce bleeding risk. It should be resumed postoperatively.     Additional Medication Instructions   - ACE/ARB: DO NOT TAKE on day of surgery (minimum 11 hours for general anesthesia).   - Statins: Continue taking on the day of surgery.    - metformin: DO NOT TAKE day of surgery.   - Herbal medications and vitamins: DO NOT TAKE 14 days prior to surgery.   - pregabalin, gabapentin: Continue without  modification.   - triptans, migraine abortives: DO NOT TAKE on day of surgery   - ibuprofen (Advil, Motrin): DO NOT TAKE 1 day before surgery.    - ketorolac (Toradol): DO NOT TAKE 1 day before surgery.    - naproxen (Aleve, Naprosyn): DO NOT TAKE 4 days before surgery.    - SSRIs, SNRIs, TCAs, Antipsychotics: Continue without modification.    - LABA, inhaled corticosteroid, long-acting anticholinergics: Continue without modification.   - rescue Inhaler: Continue PRN. Bring to hospital on the day of surgery.   - Topicals: DO NOT TAKE day of surgery.    Recommendation  Approval given to proceed with proposed procedure, without further diagnostic evaluation.    Seda Roberts is a 58 year old, presenting for the following:  Pre-Op Exam          12/9/2024     2:03 PM   Additional Questions   Roomed by Consuelo KILPATRICK LPN     HPI related to upcoming procedure: ***        12/9/2024   Pre-Op Questionnaire   Have you ever had a heart attack or stroke? No    Have you ever had surgery on your heart or blood vessels, such as a stent placement, a coronary artery bypass, or surgery on an artery in your head, neck, heart, or legs? No    Do you have chest pain with activity? No    Do you have a history of heart failure? No    Do you currently have a cold, bronchitis or symptoms of other infection? No    Do you have a cough, shortness of breath, or wheezing? No    Do you or anyone in your family have previous history of blood clots? No    Do you or does anyone in your family have a serious bleeding problem such as prolonged bleeding following surgeries or cuts? No    Have you ever had problems with anemia or been told to take iron pills? No    Have you had any abnormal blood loss such as black, tarry or bloody stools, or abnormal vaginal bleeding? No    Have you ever had a blood transfusion? No    Are you willing to have a blood transfusion if it is medically needed before, during, or after your surgery? Yes    Have you or any  of your relatives ever had problems with anesthesia? No    Do you have sleep apnea, excessive snoring or daytime drowsiness? No    Do you have any artifical heart valves or other implanted medical devices like a pacemaker, defibrillator, or continuous glucose monitor? No    Do you have artificial joints? No    Are you allergic to latex? (!) YES        Patient-reported     Health Care Directive  Patient does not have a Health Care Directive: {ADVANCE_DIRECTIVE_STATUS:614779}    Preoperative Review of    reviewed - controlled substances reflected in medication list.  {Review MNPMP for all patients per [facility] MNPMP Profile:207511}    PDMP Review         Value Time User    State PDMP site checked  Yes 8/4/2023  9:31 AM Angella Darnell APRN CNP                  Patient Active Problem List    Diagnosis Date Noted     Pulmonary emphysema, unspecified emphysema type (H) 01/23/2024     Priority: Medium     Benign essential hypertension 01/23/2024     Priority: Medium     Mixed hyperlipidemia 01/23/2024     Priority: Medium     Pain in joint, ankle and foot, left 10/05/2022     Priority: Medium     CKD (chronic kidney disease) stage 2, GFR 60-89 ml/min 03/31/2022     Priority: Medium     Osteopenia of right foot 02/03/2021     Priority: Medium     Anxiety state 01/30/2018     Priority: Medium     Other isolated or specific phobias 01/30/2018     Priority: Medium     Degenerative disc disease at L5-S1 level 01/30/2018     Priority: Medium     Hidradenitis suppurativa 01/30/2018     Priority: Medium     Tobacco use disorder 01/30/2018     Priority: Medium     Gastroesophageal reflux disease 01/18/2017     Priority: Medium     Pain management contract broken 06/05/2015     Priority: Medium     Overview:   Contract violation - see 12/1/15 letter.       Urge incontinence 06/04/2014     Priority: Medium     Alopecia areata 02/21/2014     Priority: Medium     Benign paroxysmal positional vertigo 02/28/2013     Priority: Medium      Ovarian cyst, left 05/09/2012     Priority: Medium     Other acquired deformity of ankle and foot(736.79) 05/09/2012     Priority: Medium     RA (rheumatoid arthritis) (H) 05/09/2012     Priority: Medium     Overview:   Diagnosed [place] 2012       Asthma 05/04/2012     Priority: Medium     Seasonal allergic rhinitis 09/27/2011     Priority: Medium     Symptomatic menopausal or female climacteric states 09/21/2010     Priority: Medium     Other diseases of lung, not elsewhere classified 08/01/2007     Priority: Medium      Past Medical History:   Diagnosis Date     Allergic rhinitis 09/27/2011          Disorder of kidney and ureter 08/08/2008    Kidney disease GFR 87     Dorsalgia     No Comments Provided     Generalized anxiety disorder     No Comments Provided     Hidradenitis suppurativa     No Comments Provided     Other disorders of lung (CODE) 08/01/2007    Pulmonary nodules, stable on follow-up chest CT 12/08.  No further imaging recommended.     Other specified disorders of Eustachian tube, unspecified ear 02/27/2012          Personal history of other medical treatment (CODE)     Childbirth x4     Rheumatoid arthritis (H) 02/27/2012          Tobacco use     No Comments Provided     Past Surgical History:   Procedure Laterality Date     ARTHROSCOPY KNEE  05/2017    Dr Torres     ARTHROSCOPY KNEE  07/20/2017    Dr Garza, lateral release, meniscal repair     ARTHROTOMY WRIST Left 2011    Dr. Torres     CHOLECYSTECTOMY  06/2007    Emergency tracheotomy following failed intubation for laparoscopic cholecystectomy.     DILATION AND CURETTAGE  09/2006          HERNIA REPAIR  2007    Incisoinal hernia repair     HYSTERECTOMY TOTAL ABDOMINAL  02/2010          IMPLANT STIMULATOR AND LEADS SACRAL NERVE (STAGE ONE AND TWO) N/A 12/11/2018    Procedure: Interstim Battery Replacement;  Surgeon: Rl Ambriz MD;  Location:  OR     INTERSTIM DEVICE STAGE 2  01/06/2014    Dr. Ambriz [facility]     LAPAROSCOPIC ABLATION  ENDOMETRIOSIS  09/2006          OOPHORECTOMY Left 2010     Dr Thorpe     RECONSTRUCT FOREFOOT WITH METATARSOPHALANGEAL (MTP) FUSION Right 5/5/2022    Procedure: Right fibular sesmoid excision and 1st metatarsal phalangeal joint fusion;  Surgeon: Rl Nathan DPM;  Location: [facility] OR     RELEASE CARPAL TUNNEL  02/02/2017          REMOVE HARDWARE FOOT Right 8/10/2023    Procedure: REMOVAL, HARDWARE, FOOT;  Surgeon: Rl Nathan DPM;  Location: [facility] OR     SALPINGO OOPHORECTOMY,R/L/KELSEY Right 06/1999    Right salpingo-oophorectomy for hemorrhagic corpus luteum cyst     TRACHEOSTOMY  2007    emergency following failed intubation during cholecystectomy     TYMPANOSTOMY, LOCAL/TOPICAL ANESTHESIA  08/2006    PE tube placement     Current Outpatient Medications   Medication Sig Dispense Refill     albuterol (PROAIR HFA/PROVENTIL HFA/VENTOLIN HFA) 108 (90 Base) MCG/ACT inhaler Inhale 1-2 puffs into the lungs every 4 hours as needed for shortness of breath, wheezing or cough 18 g 4     aspirin 81 MG EC tablet Take 1 tablet (81 mg) by mouth 2 times daily 60 tablet 0     atorvastatin (LIPITOR) 40 MG tablet Take 1 tablet (40 mg) by mouth at bedtime 90 tablet 4     budesonide-formoterol (SYMBICORT) 80-4.5 MCG/ACT Inhaler Inhale 2 puffs into the lungs 2 times daily - Rinse mouth well after use to prevent Thrush 10.2 g 11     cyclobenzaprine (FLEXERIL) 10 MG tablet Take 1 tablet (10 mg) by mouth 2 times daily as needed for muscle spasms. 30 tablet 0     gabapentin (NEURONTIN) 400 MG capsule Take 2 capsules (800 mg) by mouth 2 times daily 360 capsule 4     ibuprofen (ADVIL/MOTRIN) 800 MG tablet TAKE 1 TABLET BY MOUTH THREE TIMES DAILY AS NEEDED 90 tablet 0     ipratropium - albuterol 0.5 mg/2.5 mg/3 mL (DUONEB) 0.5-2.5 (3) MG/3ML neb solution Take 1 vial (3 mLs) by nebulization every 4 hours as needed for shortness of breath, wheezing or cough 90 mL 11     ketorolac (TORADOL) 10 MG tablet Take 1 tablet (10 mg) by mouth every 6  "hours as needed for moderate pain. 20 tablet 0     lisinopril (ZESTRIL) 10 MG tablet Take 1 tablet (10 mg) by mouth daily 90 tablet 4     order for DME Equipment being ordered: home neb set up with mask and tubing 1 Device 0     PARoxetine (PAXIL) 20 MG tablet Take 1 tablet (20 mg) by mouth every morning 90 tablet 4     SM PAIN RELIEVER 325 MG tablet        SUMAtriptan (IMITREX) 50 MG tablet Take 1 tablet (50 mg) by mouth at onset of headache for migraine May repeat in 2 hours. Max 4 tablets/24 hours. 9 tablet 1     valACYclovir (VALTREX) 1000 mg tablet Take 1 tablet (1,000 mg) by mouth 3 times daily for 7 days. 21 tablet 0       Allergies   Allergen Reactions     Adhesive Tape      Bee Pollen Swelling     Seasonal     Bee Venom Unknown     Folic Acid Itching     Meloxicam Unknown     Pollen Extract      Seasonal     Amoxicillin Rash     Liquid Adhesive Rash     Nabumetone GI Disturbance     GI upset        Social History     Tobacco Use     Smoking status: Every Day     Current packs/day: 1.00     Average packs/day: 1 pack/day for 43.9 years (43.9 ttl pk-yrs)     Types: Cigarettes     Start date: 1981     Smokeless tobacco: Never   Substance Use Topics     Alcohol use: No     Alcohol/week: 0.0 standard drinks of alcohol     {FAMILY HISTORY (Optional):542078748}  History   Drug Use No           {ROS Picklists (Optional):516463}    Objective    BP (!) 140/82 (BP Location: Right arm, Patient Position: Sitting, Cuff Size: Adult Large)   Pulse 76   Temp 98  F (36.7  C) (Temporal)   Resp 20   Ht 1.626 m (5' 4\")   Wt 81.2 kg (179 lb)   LMP 01/01/2007 (Approximate)   SpO2 94%   BMI 30.73 kg/m     Estimated body mass index is 30.73 kg/m  as calculated from the following:    Height as of this encounter: 1.626 m (5' 4\").    Weight as of this encounter: 81.2 kg (179 lb).  Physical Exam  {Exam List :479846}    No results for input(s): \"HGB\", \"PLT\", \"INR\", \"NA\", \"POTASSIUM\", \"CR\", \"A1C\" in the last 8760 hours. "     Diagnostics  No labs were ordered during this visit.   EKG required for {PREOP EKG CRITERIA:081448} and not completed in the last 90 days.     Revised Cardiac Risk Index (RCRI)  The patient has the following serious cardiovascular risks for perioperative complications:   - No serious cardiac risks = 0 points     RCRI Interpretation: {REVISED CARDIAC RISK INTERPRETATION :032445}         Signed Electronically by: Ang Gilman DO  A copy of this evaluation report is provided to the requesting physician.    {Provider Resources  Preop [facility] Preop Guidelines  Revised Cardiac Risk Index :466402}   {Email feedback regarding this note to [email or web address]   :505509}  Answers submitted by the patient for this visit:  Patient Health Questionnaire (G7) (Submitted on 12/9/2024)  BROOKS 7 TOTAL SCORE: 0     "   Smoking status: Every Day     Current packs/day: 1.00     Average packs/day: 1 pack/day for 44.0 years (44.0 ttl pk-yrs)     Types: Cigarettes     Start date: 1981    Smokeless tobacco: Never   Substance Use Topics    Alcohol use: No     Alcohol/week: 0.0 standard drinks of alcohol     Family History   Problem Relation Age of Onset    Arthritis Mother         Arthritis,Rheumatoid    Cancer Mother         Cancer, lung and uterine cancer    Heart Disease Father         Heart Disease,CAD, CABG, peripheral vascular dise    Thyroid Disease Father         Thyroid Disease, hyperlipidemia    Other - See Comments Father         djd    Asthma Brother         Asthma    Asthma Sister         Asthma    Other - See Comments Sister         Psychiatric illness,Anxiety    Other - See Comments Son         x2 back surgeries    Breast Cancer No family hx of         Cancer-breast     History   Drug Use No             Review of Systems  Constitutional, HEENT, cardiovascular, pulmonary, GI, , musculoskeletal, neuro, skin, endocrine and psych systems are negative, except as otherwise noted.    Objective    BP (!) 140/82 (BP Location: Right arm, Patient Position: Sitting, Cuff Size: Adult Large)   Pulse 76   Temp 98  F (36.7  C) (Temporal)   Resp 20   Ht 1.626 m (5' 4\")   Wt 81.2 kg (179 lb)   LMP 01/01/2007 (Approximate)   SpO2 94%   BMI 30.73 kg/m     Estimated body mass index is 30.73 kg/m  as calculated from the following:    Height as of this encounter: 1.626 m (5' 4\").    Weight as of this encounter: 81.2 kg (179 lb).    Physical Exam  Vitals and nursing note reviewed.   Constitutional:       General: She is not in acute distress.     Appearance: She is obese.   HENT:      Head: Normocephalic.      Right Ear: Ear canal normal. No middle ear effusion. Tympanic membrane is retracted. Tympanic membrane is not erythematous.      Left Ear: Ear canal normal.  No middle ear effusion. Tympanic membrane is retracted. Tympanic " "membrane is not erythematous.      Nose: Congestion present.      Mouth/Throat:      Mouth: Mucous membranes are moist.      Pharynx: Postnasal drip present. No posterior oropharyngeal erythema.   Cardiovascular:      Rate and Rhythm: Normal rate and regular rhythm.      Heart sounds: No murmur heard.     No friction rub. No gallop.   Pulmonary:      Effort: Pulmonary effort is normal.      Breath sounds: No wheezing, rhonchi or rales.   Abdominal:      General: Bowel sounds are normal.      Tenderness: There is no abdominal tenderness.   Musculoskeletal:         General: Tenderness (plantar aspect of left foot) present.      Right lower leg: No edema.      Left lower leg: No edema.   Skin:     General: Skin is warm.      Capillary Refill: Capillary refill takes less than 2 seconds.   Neurological:      Mental Status: She is alert.   Psychiatric:         Mood and Affect: Mood is anxious.             No results for input(s): \"HGB\", \"PLT\", \"INR\", \"NA\", \"POTASSIUM\", \"CR\", \"A1C\" in the last 8760 hours.     Diagnostics  No labs were ordered during this visit.   EKG required for hx of tachycardia/palpitations and not completed in the last 90 days.     Revised Cardiac Risk Index (RCRI)  The patient has the following serious cardiovascular risks for perioperative complications:   - No serious cardiac risks = 0 points     RCRI Interpretation: 0 points: Class I (very low risk - 0.4% complication rate)      Results for orders placed or performed in visit on 12/09/24   Comprehensive metabolic panel     Status: Abnormal   Result Value Ref Range    Sodium 139 135 - 145 mmol/L    Potassium 4.1 3.4 - 5.3 mmol/L    Carbon Dioxide (CO2) 25 22 - 29 mmol/L    Anion Gap 7 7 - 15 mmol/L    Urea Nitrogen 16.8 6.0 - 20.0 mg/dL    Creatinine 0.76 0.51 - 0.95 mg/dL    GFR Estimate >90 >60 mL/min/1.73m2    Calcium 9.2 8.8 - 10.4 mg/dL    Chloride 107 98 - 107 mmol/L    Glucose 114 (H) 70 - 99 mg/dL    Alkaline Phosphatase 97 40 - 150 U/L    " AST 19 0 - 45 U/L    ALT 19 0 - 50 U/L    Protein Total 7.6 6.4 - 8.3 g/dL    Albumin 4.5 3.5 - 5.2 g/dL    Bilirubin Total 0.5 <=1.2 mg/dL    Patient Fasting > 8hrs? Yes    Hemoglobin A1c     Status: Abnormal   Result Value Ref Range    Estimated Average Glucose 126 (H) <117 mg/dL    Hemoglobin A1C 6.0 (H) <5.7 %   Lipid Panel     Status: Abnormal   Result Value Ref Range    Cholesterol 160 <200 mg/dL    Triglycerides 156 (H) <150 mg/dL    Direct Measure HDL 47 (L) >=50 mg/dL    LDL Cholesterol Calculated 82 <100 mg/dL    Non HDL Cholesterol 113 <130 mg/dL    Patient Fasting > 8hrs? Yes     Narrative    Cholesterol  Desirable: < 200 mg/dL  Borderline High: 200 - 239 mg/dL  High: >= 240 mg/dL    Triglycerides  Normal: < 150 mg/dL  Borderline High: 150 - 199 mg/dL  High: 200-499 mg/dL  Very High: >= 500 mg/dL    Direct Measure HDL  Female: >= 50 mg/dL   Male: >= 40 mg/dL    LDL Cholesterol  Desirable: < 100 mg/dL  Above Desirable: 100 - 129 mg/dL   Borderline High: 130 - 159 mg/dL   High:  160 - 189 mg/dL   Very High: >= 190 mg/dL    Non HDL Cholesterol  Desirable: < 130 mg/dL  Above Desirable: 130 - 159 mg/dL  Borderline High: 160 - 189 mg/dL  High: 190 - 219 mg/dL  Very High: >= 220 mg/dL   CBC with platelets and differential     Status: Abnormal   Result Value Ref Range    WBC Count 2.4 (L) 4.0 - 11.0 10e3/uL    RBC Count 3.08 (L) 3.80 - 5.20 10e6/uL    Hemoglobin 12.0 11.7 - 15.7 g/dL    Hematocrit 34.4 (L) 35.0 - 47.0 %     (H) 78 - 100 fL    MCH 39.0 (H) 26.5 - 33.0 pg    MCHC 34.9 31.5 - 36.5 g/dL    RDW 16.1 (H) 10.0 - 15.0 %    Platelet Count 197 150 - 450 10e3/uL    % Neutrophils 11 %    % Lymphocytes 76 %    % Monocytes 10 %    % Eosinophils 2 %    % Basophils 1 %    % Immature Granulocytes 0 %    NRBCs per 100 WBC 0 <1 /100    Absolute Neutrophils 0.3 (LL) 1.6 - 8.3 10e3/uL    Absolute Lymphocytes 1.8 0.8 - 5.3 10e3/uL    Absolute Monocytes 0.2 0.0 - 1.3 10e3/uL    Absolute Eosinophils 0.0 0.0 -  0.7 10e3/uL    Absolute Basophils 0.0 0.0 - 0.2 10e3/uL    Absolute Immature Granulocytes 0.0 <=0.4 10e3/uL    Absolute NRBCs 0.0 10e3/uL   EKG 12-lead, tracing only (Same Day)     Status: None   Result Value Ref Range    Systolic Blood Pressure  mmHg    Diastolic Blood Pressure  mmHg    Ventricular Rate 70 BPM    Atrial Rate 70 BPM    TN Interval 134 ms    QRS Duration 66 ms     ms    QTc 438 ms    P Axis 58 degrees    R AXIS 2 degrees    T Axis 15 degrees    Interpretation ECG       Sinus rhythm  Normal ECG  When compared with ECG of 02-May-2022 15:25,  No significant change was found  Confirmed by DO CROWDER STACY ([id number]) on 12/13/2024 6:31:45 AM     CBC and Differential     Status: Abnormal    Narrative    The following orders were created for panel order CBC and Differential.  Procedure                               Abnormality         Status                     ---------                               -----------         ------                     CBC with platelets and d...[[id number]]  Abnormal            Final result               RBC and Platelet Morphology[[id number]]                                                   Please view results for these tests on the individual orders.     XR FOOT 3 VIEWS STANDING LEFT 10/24/2024 12:52 PM  HISTORY: Left foot pain  COMPARISONS: 7/27/2023.  TECHNIQUE: 3 views.  FINDINGS: No acute fracture or dislocation is seen. There is no focal  bone lesion.  There is mild spur formation at the insertion site of the Achilles  tendon, similar to the prior exam.  A few hammertoe deformities are seen.  IMPRESSION: No acute bony abnormality.      XR ANKLE LEFT G/E 3 VIEWS  Date: 11/4/24  HISTORY: left lateral ankle pain and swelling.  COMPARISON:  None.  TECHNIQUE:  3 views of the left ankle were obtained.  FINDINGS:  No fracture or dislocation is identified. The joint spaces  are preserved.  There is bony spurring at the insertion of the  Achilles tendon into the  calcaneus.  IMPRESSION: Calcaneal spur         Moderate medical decision making with acute and chronic conditions to be considered as well as preop evaluation, review of medical records including test ordered by other physicians which I interpreted and reviewed with patient, counseled, ordered test, and ordered medications.    I spent a total of 47 minutes on the day of the visit.  I spent 37 minutes face-to-face with patient including 3 minutes on nicotine cessation counseling.  Total time spent by me today doing chart review, history and exam, documentation and further activities per the note on day of encounter.    The longitudinal plan of care for the diagnosis(es)/condition(s) as documented were addressed during this visit. Due to the added complexity in care, I will continue to support Alejandra in the subsequent management and with ongoing continuity of care.    Signed Electronically by: Ang Gilman DO  A copy of this evaluation report is provided to the requesting physician.         Answers submitted by the patient for this visit:  Patient Health Questionnaire (G7) (Submitted on 12/9/2024)  BROOKS 7 TOTAL SCORE: 0

## 2024-12-09 NOTE — TELEPHONE ENCOUNTER
DATE/TIME OF CALL RECEIVED FROM LAB:  12/09/24 3:48 PM  LAB TEST:  Absolute Neutrophil  LAB VALUE:  0.3  PROVIDER NOTIFIED?: Yes  PROVIDER NAME: Dr. Brian Gilman  DATE/TIME LAB VALUE REPORTED TO PROVIDER: 12/09/24 3:49 PM  MECHANISM OF PROVIDER NOTIFICATION: Face-To-Face  PROVIDER RESPONSE: Dr. Gilman wants to talk to Hematology about this patient. Simi from Hematology is still here. Her direct number is [phone]. Notified Dr. Gilman of this.   Krysten Sanchez RN on 12/9/2024 at 4:04 PM

## 2024-12-09 NOTE — NURSING NOTE
"Patient presents to clinic today for pre-op. Patient having mass removed from L foot by Dr. Nathan on 12/19/24.    Chief Complaint   Patient presents with    Pre-Op Exam       FOOD SECURITY SCREENING QUESTIONS  Hunger Vital Signs:  Within the past 12 months we worried whether our food would run out before we got money to buy more. Never  Within the past 12 months the food we bought just didn't last and we didn't have money to get more. Never  Consuelo Jackson 12/9/2024 2:08 PM      Initial BP (!) 140/82 (BP Location: Right arm, Patient Position: Sitting, Cuff Size: Adult Large)   Pulse 76   Temp 98  F (36.7  C) (Temporal)   Resp 20   Ht 1.626 m (5' 4\")   Wt 81.2 kg (179 lb)   LMP 01/01/2007 (Approximate)   SpO2 94%   BMI 30.73 kg/m   Estimated body mass index is 30.73 kg/m  as calculated from the following:    Height as of this encounter: 1.626 m (5' 4\").    Weight as of this encounter: 81.2 kg (179 lb).  Medication Reconciliation: complete    Consuelo Jackson  "

## 2024-12-13 LAB
ATRIAL RATE - MUSE: 70 BPM
DIASTOLIC BLOOD PRESSURE - MUSE: NORMAL MMHG
INTERPRETATION ECG - MUSE: NORMAL
P AXIS - MUSE: 58 DEGREES
PR INTERVAL - MUSE: 134 MS
QRS DURATION - MUSE: 66 MS
QT - MUSE: 406 MS
QTC - MUSE: 438 MS
R AXIS - MUSE: 2 DEGREES
SYSTOLIC BLOOD PRESSURE - MUSE: NORMAL MMHG
T AXIS - MUSE: 15 DEGREES
VENTRICULAR RATE- MUSE: 70 BPM

## 2024-12-18 ENCOUNTER — ANESTHESIA EVENT (OUTPATIENT)
Dept: SURGERY | Facility: OTHER | Age: 59
End: 2024-12-18
Payer: MEDICARE

## 2024-12-19 ENCOUNTER — ANESTHESIA (OUTPATIENT)
Dept: SURGERY | Facility: OTHER | Age: 59
End: 2024-12-19
Payer: MEDICARE

## 2024-12-19 ENCOUNTER — HOSPITAL ENCOUNTER (OUTPATIENT)
Facility: OTHER | Age: 59
Discharge: HOME | End: 2024-12-19
Attending: PODIATRIST | Admitting: PODIATRIST
Payer: MEDICARE

## 2024-12-19 VITALS
HEIGHT: 68 IN | HEART RATE: 70 BPM | SYSTOLIC BLOOD PRESSURE: 115 MMHG | TEMPERATURE: 97.7 F | RESPIRATION RATE: 15 BRPM | BODY MASS INDEX: 26.22 KG/M2 | WEIGHT: 173 LBS | DIASTOLIC BLOOD PRESSURE: 80 MMHG | OXYGEN SATURATION: 91 %

## 2024-12-19 DIAGNOSIS — G89.18 POST-OP PAIN: Primary | ICD-10-CM

## 2024-12-19 LAB — GLUCOSE BLDC GLUCOMTR-MCNC: 101 MG/DL (ref 70–99)

## 2024-12-19 PROCEDURE — 272N000001 HC OR GENERAL SUPPLY STERILE: Performed by: PODIATRIST

## 2024-12-19 PROCEDURE — 27687 REVISION OF CALF TENDON: CPT | Mod: LT | Performed by: PODIATRIST

## 2024-12-19 PROCEDURE — 258N000003 HC RX IP 258 OP 636: Performed by: NURSE ANESTHETIST, CERTIFIED REGISTERED

## 2024-12-19 PROCEDURE — 88305 TISSUE EXAM BY PATHOLOGIST: CPT

## 2024-12-19 PROCEDURE — 999N000157 HC STATISTIC RCP TIME EA 10 MIN

## 2024-12-19 PROCEDURE — 999N000141 HC STATISTIC PRE-PROCEDURE NURSING ASSESSMENT: Performed by: PODIATRIST

## 2024-12-19 PROCEDURE — 250N000009 HC RX 250: Performed by: NURSE ANESTHETIST, CERTIFIED REGISTERED

## 2024-12-19 PROCEDURE — 370N000017 HC ANESTHESIA TECHNICAL FEE, PER MIN: Performed by: PODIATRIST

## 2024-12-19 PROCEDURE — 250N000011 HC RX IP 250 OP 636: Performed by: PODIATRIST

## 2024-12-19 PROCEDURE — 28041 EXC FOOT/TOE TUM DEP 1.5CM/>: CPT | Mod: LT | Performed by: PODIATRIST

## 2024-12-19 PROCEDURE — 250N000011 HC RX IP 250 OP 636: Performed by: NURSE ANESTHETIST, CERTIFIED REGISTERED

## 2024-12-19 PROCEDURE — 250N000009 HC RX 250: Performed by: PODIATRIST

## 2024-12-19 PROCEDURE — 94640 AIRWAY INHALATION TREATMENT: CPT

## 2024-12-19 PROCEDURE — 360N000075 HC SURGERY LEVEL 2, PER MIN: Performed by: PODIATRIST

## 2024-12-19 PROCEDURE — 710N000012 HC RECOVERY PHASE 2, PER MINUTE: Performed by: PODIATRIST

## 2024-12-19 PROCEDURE — 82962 GLUCOSE BLOOD TEST: CPT

## 2024-12-19 RX ORDER — ACETAMINOPHEN 500 MG
1000 TABLET ORAL EVERY 8 HOURS
Qty: 30 TABLET | Refills: 0 | Status: SHIPPED | OUTPATIENT
Start: 2024-12-19 | End: 2024-12-24

## 2024-12-19 RX ORDER — HYDROMORPHONE HCL IN WATER/PF 6 MG/30 ML
0.2 PATIENT CONTROLLED ANALGESIA SYRINGE INTRAVENOUS EVERY 5 MIN PRN
Status: DISCONTINUED | OUTPATIENT
Start: 2024-12-19 | End: 2024-12-19 | Stop reason: HOSPADM

## 2024-12-19 RX ORDER — FENTANYL CITRATE 50 UG/ML
INJECTION, SOLUTION INTRAMUSCULAR; INTRAVENOUS PRN
Status: DISCONTINUED | OUTPATIENT
Start: 2024-12-19 | End: 2024-12-19

## 2024-12-19 RX ORDER — ONDANSETRON 4 MG/1
4 TABLET, ORALLY DISINTEGRATING ORAL EVERY 30 MIN PRN
Status: DISCONTINUED | OUTPATIENT
Start: 2024-12-19 | End: 2024-12-19 | Stop reason: HOSPADM

## 2024-12-19 RX ORDER — ONDANSETRON 2 MG/ML
4 INJECTION INTRAMUSCULAR; INTRAVENOUS EVERY 30 MIN PRN
Status: DISCONTINUED | OUTPATIENT
Start: 2024-12-19 | End: 2024-12-19 | Stop reason: HOSPADM

## 2024-12-19 RX ORDER — OXYCODONE HYDROCHLORIDE 5 MG/1
5 TABLET ORAL EVERY 6 HOURS PRN
Qty: 16 TABLET | Refills: 0 | Status: SHIPPED | OUTPATIENT
Start: 2024-12-19

## 2024-12-19 RX ORDER — LIDOCAINE 40 MG/G
CREAM TOPICAL
Status: DISCONTINUED | OUTPATIENT
Start: 2024-12-19 | End: 2024-12-19 | Stop reason: HOSPADM

## 2024-12-19 RX ORDER — IPRATROPIUM BROMIDE AND ALBUTEROL SULFATE 2.5; .5 MG/3ML; MG/3ML
3 SOLUTION RESPIRATORY (INHALATION) ONCE
Status: COMPLETED | OUTPATIENT
Start: 2024-12-19 | End: 2024-12-19

## 2024-12-19 RX ORDER — ONDANSETRON 4 MG/1
4 TABLET, ORALLY DISINTEGRATING ORAL
Status: DISCONTINUED | OUTPATIENT
Start: 2024-12-19 | End: 2024-12-19 | Stop reason: HOSPADM

## 2024-12-19 RX ORDER — ONDANSETRON 2 MG/ML
INJECTION INTRAMUSCULAR; INTRAVENOUS PRN
Status: DISCONTINUED | OUTPATIENT
Start: 2024-12-19 | End: 2024-12-19

## 2024-12-19 RX ORDER — NALOXONE HYDROCHLORIDE 0.4 MG/ML
0.1 INJECTION, SOLUTION INTRAMUSCULAR; INTRAVENOUS; SUBCUTANEOUS
Status: DISCONTINUED | OUTPATIENT
Start: 2024-12-19 | End: 2024-12-19 | Stop reason: HOSPADM

## 2024-12-19 RX ORDER — PROPOFOL 10 MG/ML
INJECTION, EMULSION INTRAVENOUS CONTINUOUS PRN
Status: DISCONTINUED | OUTPATIENT
Start: 2024-12-19 | End: 2024-12-19

## 2024-12-19 RX ORDER — MAGNESIUM HYDROXIDE 1200 MG/15ML
LIQUID ORAL PRN
Status: DISCONTINUED | OUTPATIENT
Start: 2024-12-19 | End: 2024-12-19 | Stop reason: HOSPADM

## 2024-12-19 RX ORDER — LIDOCAINE HYDROCHLORIDE 20 MG/ML
INJECTION, SOLUTION INFILTRATION; PERINEURAL PRN
Status: DISCONTINUED | OUTPATIENT
Start: 2024-12-19 | End: 2024-12-19

## 2024-12-19 RX ORDER — FENTANYL CITRATE 50 UG/ML
25 INJECTION, SOLUTION INTRAMUSCULAR; INTRAVENOUS EVERY 5 MIN PRN
Status: DISCONTINUED | OUTPATIENT
Start: 2024-12-19 | End: 2024-12-19 | Stop reason: HOSPADM

## 2024-12-19 RX ORDER — SODIUM CHLORIDE, SODIUM LACTATE, POTASSIUM CHLORIDE, CALCIUM CHLORIDE 600; 310; 30; 20 MG/100ML; MG/100ML; MG/100ML; MG/100ML
INJECTION, SOLUTION INTRAVENOUS CONTINUOUS
Status: DISCONTINUED | OUTPATIENT
Start: 2024-12-19 | End: 2024-12-19 | Stop reason: HOSPADM

## 2024-12-19 RX ORDER — FENTANYL CITRATE 50 UG/ML
50 INJECTION, SOLUTION INTRAMUSCULAR; INTRAVENOUS EVERY 5 MIN PRN
Status: DISCONTINUED | OUTPATIENT
Start: 2024-12-19 | End: 2024-12-19 | Stop reason: HOSPADM

## 2024-12-19 RX ORDER — OXYCODONE HYDROCHLORIDE 5 MG/1
5 TABLET ORAL
Status: DISCONTINUED | OUTPATIENT
Start: 2024-12-19 | End: 2024-12-19 | Stop reason: HOSPADM

## 2024-12-19 RX ORDER — CLINDAMYCIN PHOSPHATE 900 MG/50ML
900 INJECTION, SOLUTION INTRAVENOUS
Status: COMPLETED | OUTPATIENT
Start: 2024-12-19 | End: 2024-12-19

## 2024-12-19 RX ORDER — ASPIRIN 81 MG/1
81 TABLET ORAL 2 TIMES DAILY
Qty: 60 TABLET | Refills: 0 | Status: SHIPPED | OUTPATIENT
Start: 2024-12-19

## 2024-12-19 RX ORDER — CLINDAMYCIN PHOSPHATE 900 MG/50ML
900 INJECTION, SOLUTION INTRAVENOUS SEE ADMIN INSTRUCTIONS
Status: DISCONTINUED | OUTPATIENT
Start: 2024-12-19 | End: 2024-12-19 | Stop reason: HOSPADM

## 2024-12-19 RX ORDER — HYDROXYZINE HYDROCHLORIDE 10 MG/1
10 TABLET, FILM COATED ORAL
Status: DISCONTINUED | OUTPATIENT
Start: 2024-12-19 | End: 2024-12-19 | Stop reason: HOSPADM

## 2024-12-19 RX ORDER — IBUPROFEN 600 MG/1
600 TABLET, FILM COATED ORAL EVERY 6 HOURS
Qty: 12 TABLET | Refills: 0 | Status: SHIPPED | OUTPATIENT
Start: 2024-12-19 | End: 2024-12-22

## 2024-12-19 RX ORDER — HYDROMORPHONE HCL IN WATER/PF 6 MG/30 ML
0.4 PATIENT CONTROLLED ANALGESIA SYRINGE INTRAVENOUS EVERY 5 MIN PRN
Status: DISCONTINUED | OUTPATIENT
Start: 2024-12-19 | End: 2024-12-19 | Stop reason: HOSPADM

## 2024-12-19 RX ORDER — DEXAMETHASONE SODIUM PHOSPHATE 10 MG/ML
4 INJECTION, SOLUTION INTRAMUSCULAR; INTRAVENOUS
Status: DISCONTINUED | OUTPATIENT
Start: 2024-12-19 | End: 2024-12-19 | Stop reason: HOSPADM

## 2024-12-19 RX ADMIN — MIDAZOLAM HYDROCHLORIDE 2 MG: 1 INJECTION, SOLUTION INTRAMUSCULAR; INTRAVENOUS at 07:29

## 2024-12-19 RX ADMIN — FENTANYL CITRATE 25 MCG: 50 INJECTION INTRAMUSCULAR; INTRAVENOUS at 07:29

## 2024-12-19 RX ADMIN — FENTANYL CITRATE 25 MCG: 50 INJECTION INTRAMUSCULAR; INTRAVENOUS at 07:42

## 2024-12-19 RX ADMIN — LIDOCAINE HYDROCHLORIDE 40 MG: 20 INJECTION, SOLUTION INFILTRATION; PERINEURAL at 07:30

## 2024-12-19 RX ADMIN — FENTANYL CITRATE 25 MCG: 50 INJECTION INTRAMUSCULAR; INTRAVENOUS at 07:51

## 2024-12-19 RX ADMIN — FENTANYL CITRATE 25 MCG: 50 INJECTION INTRAMUSCULAR; INTRAVENOUS at 07:35

## 2024-12-19 RX ADMIN — IPRATROPIUM BROMIDE AND ALBUTEROL SULFATE 3 ML: .5; 3 SOLUTION RESPIRATORY (INHALATION) at 07:15

## 2024-12-19 RX ADMIN — PROPOFOL 100 MCG/KG/MIN: 10 INJECTION, EMULSION INTRAVENOUS at 07:30

## 2024-12-19 RX ADMIN — CLINDAMYCIN PHOSPHATE 900 MG: 900 INJECTION, SOLUTION INTRAVENOUS at 07:22

## 2024-12-19 RX ADMIN — ONDANSETRON HYDROCHLORIDE 4 MG: 2 SOLUTION INTRAMUSCULAR; INTRAVENOUS at 07:29

## 2024-12-19 RX ADMIN — SODIUM CHLORIDE, SODIUM LACTATE, POTASSIUM CHLORIDE, AND CALCIUM CHLORIDE: .6; .31; .03; .02 INJECTION, SOLUTION INTRAVENOUS at 07:13

## 2024-12-19 ASSESSMENT — ACTIVITIES OF DAILY LIVING (ADL)
ADLS_ACUITY_SCORE: 35

## 2024-12-19 ASSESSMENT — LIFESTYLE VARIABLES: TOBACCO_USE: 1

## 2024-12-19 ASSESSMENT — COPD QUESTIONNAIRES: COPD: 1

## 2024-12-19 NOTE — ANESTHESIA POSTPROCEDURE EVALUATION
Patient: Alejandra Villegas    Procedure: Procedure(s):  excision of soft tissue mass left foot,  gastroc recession       Anesthesia Type:  MAC    Note:  Disposition: Outpatient   Postop Pain Control: Uneventful            Sign Out: Well controlled pain   PONV: No   Neuro/Psych: Uneventful            Sign Out: Acceptable/Baseline neuro status   Airway/Respiratory: Uneventful            Sign Out: Acceptable/Baseline resp. status   CV/Hemodynamics: Uneventful            Sign Out: Acceptable CV status; No obvious hypovolemia; No obvious fluid overload   Other NRE: NONE   DID A NON-ROUTINE EVENT OCCUR? No       Last vitals:  Vitals Value Taken Time   /80 12/19/24 0845   Temp     Pulse 70 12/19/24 0845   Resp     SpO2 91 % 12/19/24 0851   Vitals shown include unfiled device data.    Electronically Signed By: AMARILIS Mcgill CRNA  December 19, 2024  12:06 PM

## 2024-12-19 NOTE — ANESTHESIA PREPROCEDURE EVALUATION
Anesthesia Pre-Procedure Evaluation    Patient: Alejandra Villegas   MRN: [id number] : 1965        Procedure : Procedure(s):  excision of soft tissue mass left foot,  gastroc recession          Past Medical History:   Diagnosis Date    Allergic rhinitis 2011         Disorder of kidney and ureter 2008    Kidney disease GFR 87    Dorsalgia     No Comments Provided    Generalized anxiety disorder     No Comments Provided    Hidradenitis suppurativa     No Comments Provided    Other disorders of lung (CODE) 2007    Pulmonary nodules, stable on follow-up chest CT .  No further imaging recommended.    Other specified disorders of Eustachian tube, unspecified ear 2012         Personal history of other medical treatment (CODE)     Childbirth x4    Rheumatoid arthritis (H) 2012         Tobacco use     No Comments Provided      Past Surgical History:   Procedure Laterality Date    ARTHROSCOPY KNEE  2017    Dr Torres    ARTHROSCOPY KNEE  2017    Dr Garza, lateral release, meniscal repair    ARTHROTOMY WRIST Left     Dr. Torres    CHOLECYSTECTOMY  2007    Emergency tracheotomy following failed intubation for laparoscopic cholecystectomy.    DILATION AND CURETTAGE  2006         HERNIA REPAIR  2007    Incisoinal hernia repair    HYSTERECTOMY TOTAL ABDOMINAL  2010         IMPLANT STIMULATOR AND LEADS SACRAL NERVE (STAGE ONE AND TWO) N/A 2018    Procedure: Interstim Battery Replacement;  Surgeon: Rl Ambriz MD;  Location:  OR    INTERSTI DEVICE STAGE 2  2014    Dr. Ambriz [facility]    LAPAROSCOPIC ABLATION ENDOMETRIOSIS  2006         OOPHORECTOMY Left      Dr Thorpe    RECONSTRUCT FOREFOOT WITH METATARSOPHALANGEAL (MTP) FUSION Right 2022    Procedure: Right fibular sesmoid excision and 1st metatarsal phalangeal joint fusion;  Surgeon: Rl Nathan DPM;  Location:  OR    RELEASE CARPAL TUNNEL  2017         REMOVE HARDWARE FOOT  Right 8/10/2023    Procedure: REMOVAL, HARDWARE, FOOT;  Surgeon: Rl Nathan DPM;  Location: [facility] OR    SALPINGO OOPHORECTOMY,R/L/KELSEY Right 06/1999    Right salpingo-oophorectomy for hemorrhagic corpus luteum cyst    TRACHEOSTOMY  2007    emergency following failed intubation during cholecystectomy    TYMPANOSTOMY, LOCAL/TOPICAL ANESTHESIA  08/2006    PE tube placement      Allergies   Allergen Reactions    Adhesive Tape     Bee Pollen Swelling     Seasonal    Bee Venom Unknown    Folic Acid Itching    Meloxicam Unknown    Pollen Extract      Seasonal    Amoxicillin Rash    Liquid Adhesive Rash    Nabumetone GI Disturbance     GI upset      Social History     Tobacco Use    Smoking status: Every Day     Current packs/day: 1.00     Average packs/day: 1 pack/day for 44.0 years (44.0 ttl pk-yrs)     Types: Cigarettes     Start date: 1981    Smokeless tobacco: Never   Substance Use Topics    Alcohol use: No     Alcohol/week: 0.0 standard drinks of alcohol      Wt Readings from Last 1 Encounters:   12/19/24 78.5 kg (173 lb)        Anesthesia Evaluation   Pt has had prior anesthetic.     History of anesthetic complications   Posssible history of difficult intubation resulitng in tracheostomy..    ROS/MED HX  ENT/Pulmonary:     (+)                tobacco use, Current use,    Mild Persistent, asthma  Treatment: Inhaler prn,   COPD,              Neurologic:  - neg neurologic ROS     Cardiovascular:     (+) Dyslipidemia hypertension- -   -  - -                                      METS/Exercise Tolerance: >4 METS    Hematologic:  - neg hematologic  ROS     Musculoskeletal:   (+)  arthritis,             GI/Hepatic:     (+) GERD, Asymptomatic on medication,                  Renal/Genitourinary:     (+) renal disease, type: CRI,            Endo:     (+)  type II DM,                    Psychiatric/Substance Use:  - neg psychiatric ROS     Infectious Disease:  - neg infectious disease ROS     Malignancy:  - neg malignancy ROS    "  Other:  - neg other ROS          Physical Exam    Airway        Mallampati: IV   TM distance: > 3 FB   Neck ROM: full   Mouth opening: > 3 cm    Respiratory Devices and Support         Dental     Comment: Upper plated.  Multiple missing lower teeth.        Cardiovascular   cardiovascular exam normal       Rhythm and rate: regular and normal     Pulmonary           (+) wheezes           OUTSIDE LABS:  CBC:   Lab Results   Component Value Date    WBC 2.4 (L) 12/09/2024    WBC 8.4 08/04/2023    HGB 12.0 12/09/2024    HGB 14.8 08/04/2023    HCT 34.4 (L) 12/09/2024    HCT 43.5 08/04/2023     12/09/2024     08/04/2023     BMP:   Lab Results   Component Value Date     12/09/2024     08/04/2023    POTASSIUM 4.1 12/09/2024    POTASSIUM 4.0 08/04/2023    CHLORIDE 107 12/09/2024    CHLORIDE 104 08/04/2023    CO2 25 12/09/2024    CO2 26 08/04/2023    BUN 16.8 12/09/2024    BUN 23.8 (H) 08/04/2023    CR 0.76 12/09/2024    CR 0.98 (H) 08/04/2023     (H) 12/09/2024     (H) 08/10/2023     COAGS: No results found for: \"PTT\", \"INR\", \"FIBR\"  POC: No results found for: \"BGM\", \"HCG\", \"HCGS\"  HEPATIC:   Lab Results   Component Value Date    ALBUMIN 4.5 12/09/2024    PROTTOTAL 7.6 12/09/2024    ALT 19 12/09/2024    AST 19 12/09/2024    ALKPHOS 97 12/09/2024    BILITOTAL 0.5 12/09/2024     OTHER:   Lab Results   Component Value Date    A1C 6.0 (H) 12/09/2024    TOBIN 9.2 12/09/2024       Anesthesia Plan    ASA Status:  3    NPO Status:  NPO Appropriate    Anesthesia Type: MAC.     - Reason for MAC: straight local not clinically adequate   Induction: Intravenous.           Consents    Anesthesia Plan(s) and associated risks, benefits, and realistic alternatives discussed. Questions answered and patient/representative(s) expressed understanding.     - Discussed: Risks, Benefits and Alternatives for BOTH SEDATION and the PROCEDURE were discussed     - Discussed with:  Patient            Postoperative " "Care    Pain management: Multi-modal analgesia.   PONV prophylaxis: Ondansetron (or other 5HT-3)     Comments:               AMARILIS PETERSON CRNA    I have reviewed the pertinent notes and labs in the chart from the past 30 days and (re)examined the patient.  Any updates or changes from those notes are reflected in this note.             # Drug Induced Platelet Defect: home medication list includes an antiplatelet medication   # Hypertension: Noted on problem list           # Overweight: Estimated body mass index is 26.7 kg/m  as calculated from the following:    Height as of this encounter: 1.715 m (5' 7.5\").    Weight as of this encounter: 78.5 kg (173 lb).       # Asthma: noted on problem list       "

## 2024-12-19 NOTE — DISCHARGE INSTRUCTIONS
[facility]  Adult Discharge Orders & Instructions      For 24 hours after surgery:  Get plenty of rest.  A responsible adult must stay with you for at least 24 hours after you leave the hospital.   You may feel lightheaded.  IF so, sit for a few minutes before standing.  Have someone help you get up.   You may have a slight fever. Call the doctor if your fever is over 101 F (38.3 C) (taken under the tongue) or lasts longer than 24 hours.  You may have a dry mouth, a sore throat, muscle aches or trouble sleeping.  These should go away after 24 hours.  Do not make important or legal decisions.  6.   Do not drive or use heavy equipment.  If you have weakness or tingling, don't drive or use heavy equipment until this feeling goes away.                                                                                                                                                                         To contact a doctor, call    [phone]______________     If you have any questions or concerns, please call the [facility] Triage Line at [phone] during normal business hours, after hours call [phone].

## 2024-12-19 NOTE — OR NURSING
Patient and responsible adult given discharge instructions with no questions regarding instructions. Claribel score 20. Pain level 0/10.  Discharged from unit via wheelchair . Patient discharged to home.

## 2024-12-19 NOTE — BRIEF OP NOTE
[facility]    Brief Operative Note    Pre-operative diagnosis: Left foot pain [M79.672]  Post-operative diagnosis Same as pre-operative diagnosis    Procedure: excision of soft tissue mass left foot,  gastroc recession, Left - Ankle    Surgeon: Surgeons and Role:     * Rl Nathan DPM - Primary     * Katherine Duque PA-C - Assisting  Anesthesia: MAC with Local   Estimated Blood Loss: Minimal    Drains: None  Specimens:   ID Type Source Tests Collected by Time Destination   1 : PLANTAR FIBROMA LEFT FOOT Tissue Foot, Left SURGICAL PATHOLOGY EXAM Rl Nathan DPM 12/19/2024  7:51 AM      Findings:   None.  Complications: None.  Implants: * No implants in log *

## 2024-12-19 NOTE — OP NOTE
SURGEON:  Rl Nathan DPM.     ASSISTANT:  Katherine Duque PA-C.      A skilled first assistant was necessary due to technical complexity of the procedure and for the patient's safety.  The assistant helped with positioning, retraction, visualization of the operative field and moreover helped to complete the procedure in a technically safe and efficient manner.       PREOPERATIVE DIAGNOSIS:   Left painful plantar fibroma, left equinus     POSTOPERATIVE DIAGNOSIS:   Same as preop     PROCEDURE:  1.  Left gastrocnemius recession  2.  Left excision of soft tissue mass subfascial measuring 2 cm by centimeter and a half     ANESTHESIA: MAC with local     HEMOSTASIS: Electrocautery.     ESTIMATED BLOOD LOSS: 20 cc    INDICATIONS FOR PROCEDURE: The patient has pain associated with described pathology she has failed conservative cares and elects proceed with surgery after detailed discussion of surgery recovery associated risk potential complications.    MATERIALS: No implanted materials     PROCEDURE: Supine position utilized MAC anesthesia and a local block performed.  Patient's left lower extremity was prepped and draped in usual sterile fashion.  No exsanguination or tourniquet was utilized today.    Attention directed to posterior medial lower leg just distal to myotendinous junction of the gastroc muscle.  Incision made careful deep dissection performed gastroc tendon exposed transected with the blunt tip scissors from medial to lateral effectively decreasing tension on the Achilles and plantar fascia improving ankle range of motion.  Flushed with saline deep tissue repaired with 2-0 Vicryl and skin with nonabsorbable suture.    Attention then directed to plantar medial instep of heel.  There is palpable plantar fibroma or underlying soft tissue mass.  Curvilinear incision made raising 2 flaps to access the medial band of the plantar fascia abnormal plantar fascial tissue subfascial was appreciated I removed a 2 x 2  cm portion of the medial band of the plantar fascia to healthy margins.  This was flushed with saline deep tissue repaired with 2-0 Vicryl and skin with nonabsorbable suture.      She was placed in a sterile dressing and a posterior sugar-tong splint she will be nonweightbearing to the plantar incision follow-up as scheduled.      Rl Nathan DPM  12/19/2024

## 2024-12-19 NOTE — INTERVAL H&P NOTE
"I have reviewed the surgical (or preoperative) H&P that is linked to this encounter, and examined the patient. There are no significant changes    Clinical Conditions Present on Arrival:  Clinically Significant Risk Factors Present on Admission                  # Drug Induced Platelet Defect: home medication list includes an antiplatelet medication      # Overweight: Estimated body mass index is 26.7 kg/m  as calculated from the following:    Height as of this encounter: 1.715 m (5' 7.5\").    Weight as of this encounter: 78.5 kg (173 lb).       "

## 2024-12-19 NOTE — ANESTHESIA CARE TRANSFER NOTE
Patient: Alejandra Villegas    Procedure: Procedure(s):  excision of soft tissue mass left foot,  gastroc recession       Diagnosis: Left foot pain [M79.672]  Diagnosis Additional Information: No value filed.    Anesthesia Type:   MAC     Note:    Oropharynx: oropharynx clear of all foreign objects  Level of Consciousness: awake  Oxygen Supplementation: nasal cannula  Level of Supplemental Oxygen (L/min / FiO2): 2  Independent Airway: airway patency satisfactory and stable  Dentition: dentition unchanged  Vital Signs Stable: post-procedure vital signs reviewed and stable  Report to RN Given: handoff report given  Patient transferred to: Phase II    Handoff Report: Identifed the Patient, Identified the Reponsible Provider, Reviewed the pertinent medical history, Discussed the surgical course, Reviewed Intra-OP anesthesia mangement and issues during anesthesia, Set expectations for post-procedure period and Allowed opportunity for questions and acknowledgement of understanding  Vitals:  Vitals Value Taken Time   BP     Temp     Pulse     Resp     SpO2 88 % 12/19/24 0824   Vitals shown include unfiled device data.    Electronically Signed By: AMARILIS Mcgill CRNA  December 19, 2024  8:26 AM

## 2024-12-26 ENCOUNTER — TRANSFERRED RECORDS (OUTPATIENT)
Dept: FAMILY MEDICINE | Facility: OTHER | Age: 59
End: 2024-12-26
Payer: MEDICARE

## 2024-12-26 VITALS — SYSTOLIC BLOOD PRESSURE: 115 MMHG | DIASTOLIC BLOOD PRESSURE: 80 MMHG

## 2024-12-26 LAB
PATH REPORT.COMMENTS IMP SPEC: NORMAL
PATH REPORT.FINAL DX SPEC: NORMAL
PATH REPORT.RELEVANT HX SPEC: NORMAL
PHOTO IMAGE: NORMAL

## 2024-12-26 NOTE — PROGRESS NOTES
Patient Quality Outreach    Patient is due for the following:   Hypertension -  BP check    Action(s) Taken:   No follow up needed at this time.    Type of outreach:    Chart review performed, no outreach needed. Blood pressure taken during last surgery. Flowsheet updated.    Questions for provider review:    None           Allison Boyd RN

## 2025-01-09 ENCOUNTER — OFFICE VISIT (OUTPATIENT)
Dept: ORTHOPEDICS | Facility: OTHER | Age: 60
End: 2025-01-09
Attending: SPECIALIST/TECHNOLOGIST
Payer: MEDICARE

## 2025-01-09 DIAGNOSIS — Z09 POSTOPERATIVE FOLLOW-UP: Primary | ICD-10-CM

## 2025-01-09 PROCEDURE — G0463 HOSPITAL OUTPT CLINIC VISIT: HCPCS

## 2025-01-09 NOTE — PROGRESS NOTES
SUBJECTIVE:  Alejandra returns for 3 week follow-up of left foot surgery.  Overall doing well.  She is here today apparently for suture removal.  She has remained nonweightbearing and uses a boot for protection.  She is primarily taking Tylenol for pain however needs an occasional oxycodone in the evenings.  She continues to elevate and ice as needed for pain and swelling as well no acute concerns today.     ROS: Musculoskeletal and general review of systems are negative, per review of previous clinic questionnaire.  Denies SOB and calf pain.    EXAM:   PHYSICAL EXAMINATION:   CONSTITUTIONAL:  The patient is alert and oriented x 3, well appearing and in no apparent distress.  Affect is pleasant and answers questions appropriately.  VASCULAR:  Circulation is intact with palpable pedal pulses and adequate capillary fill time to all digits.  Hair growth is present and appropriate to mid foot and digits. Calf nontender.  NEUROLOGIC:  Light touch sensation is intact to digits.  There is a negative Tinel sign.    INTEGUMENT:  No abnormal dermatologic lesions are noted.  Skin has normal texture and turgor.  Incisions are clean dry and intact with no signs of infection.  Some mild ecchymosis consistent with surgery in her plantar foot  MUSCULOSKELETAL:  Swelling: Minimal  Range of motion appropriate for this recovery time.     IMAGING: None    ASSESSMENT: s/p left gastroc recession and plantar foot soft tissue mass excision on 12/19/2024 with Dr. Nathan    PLAN OF CARE: Discussed progression of treatment.  Sutures removed.  She get her foot wet tomorrow no soaking for couple more weeks.  Would like her to continue using compression to help minimize swelling.  She will try to weight-bear as tolerated in her boot.  I would like her to continue wearing this boot to bed as well as to help keep the Achilles stretched out.  She should ice and elevate as needed for pain and swelling.  I did give her a new compression sock today.   She come out of her boot while elevating and start really her toes and moving her ankle.  He did ask about returning to work as she is struggling financially.  She is able to sit for work and wear her boot.  I gave her return to work for next Wednesday with the restrictions.  Follow up 4 weeks.      Katherine Duque PA-C

## 2025-01-10 ENCOUNTER — TELEPHONE (OUTPATIENT)
Dept: FAMILY MEDICINE | Facility: OTHER | Age: 60
End: 2025-01-10
Payer: MEDICARE

## 2025-01-10 NOTE — TELEPHONE ENCOUNTER
SJA-patient is looking for 5/325 of pain medication [facility] the [facility] [phone] did the surgery on 12.19.25        Please call and advise      Thank You      Penny Gonzalez on 1/10/2025 at 2:25 PM

## 2025-01-13 NOTE — TELEPHONE ENCOUNTER
Spoke with patient. She is requesting a refill of her pain medications. Instructed patient that no provider will refill that without an appointment. Dr. Gilman has an opening tomorrow at 10 AM Patient scheduled.  Krysten Sanchez RN on 1/13/2025 at 3:59 PM

## 2025-01-14 ENCOUNTER — OFFICE VISIT (OUTPATIENT)
Dept: FAMILY MEDICINE | Facility: OTHER | Age: 60
End: 2025-01-14
Attending: STUDENT IN AN ORGANIZED HEALTH CARE EDUCATION/TRAINING PROGRAM
Payer: MEDICARE

## 2025-01-14 VITALS
RESPIRATION RATE: 16 BRPM | OXYGEN SATURATION: 94 % | SYSTOLIC BLOOD PRESSURE: 134 MMHG | BODY MASS INDEX: 31.58 KG/M2 | HEIGHT: 64 IN | HEART RATE: 75 BPM | TEMPERATURE: 98.8 F | DIASTOLIC BLOOD PRESSURE: 76 MMHG | WEIGHT: 185 LBS

## 2025-01-14 DIAGNOSIS — D70.9 NEUTROPENIA, UNSPECIFIED TYPE: ICD-10-CM

## 2025-01-14 DIAGNOSIS — M51.379 DEGENERATIVE DISC DISEASE AT L5-S1 LEVEL: ICD-10-CM

## 2025-01-14 DIAGNOSIS — Z23 NEED FOR TDAP VACCINATION: ICD-10-CM

## 2025-01-14 DIAGNOSIS — R25.2 MUSCLE CRAMP: ICD-10-CM

## 2025-01-14 DIAGNOSIS — J43.9 PULMONARY EMPHYSEMA, UNSPECIFIED EMPHYSEMA TYPE (H): ICD-10-CM

## 2025-01-14 DIAGNOSIS — D72.819 LEUKOPENIA, UNSPECIFIED TYPE: ICD-10-CM

## 2025-01-14 DIAGNOSIS — N18.2 CKD (CHRONIC KIDNEY DISEASE) STAGE 2, GFR 60-89 ML/MIN: ICD-10-CM

## 2025-01-14 DIAGNOSIS — F39 MOOD DISORDER: ICD-10-CM

## 2025-01-14 DIAGNOSIS — D53.9 MACROCYTIC ANEMIA: ICD-10-CM

## 2025-01-14 DIAGNOSIS — E66.811 CLASS 1 OBESITY WITH SERIOUS COMORBIDITY AND BODY MASS INDEX (BMI) OF 31.0 TO 31.9 IN ADULT, UNSPECIFIED OBESITY TYPE: ICD-10-CM

## 2025-01-14 DIAGNOSIS — E78.2 MIXED HYPERLIPIDEMIA: ICD-10-CM

## 2025-01-14 DIAGNOSIS — I10 ESSENTIAL HYPERTENSION: ICD-10-CM

## 2025-01-14 DIAGNOSIS — F17.218 CIGARETTE NICOTINE DEPENDENCE WITH OTHER NICOTINE-INDUCED DISORDER: ICD-10-CM

## 2025-01-14 DIAGNOSIS — Z72.0 TOBACCO USER: ICD-10-CM

## 2025-01-14 DIAGNOSIS — R73.03 PREDIABETES: ICD-10-CM

## 2025-01-14 DIAGNOSIS — G89.18 POSTOPERATIVE PAIN: Primary | ICD-10-CM

## 2025-01-14 LAB
ALBUMIN SERPL BCG-MCNC: 4.1 G/DL (ref 3.5–5.2)
ALP SERPL-CCNC: 124 U/L (ref 40–150)
ALT SERPL W P-5'-P-CCNC: 17 U/L (ref 0–50)
ANION GAP SERPL CALCULATED.3IONS-SCNC: 11 MMOL/L (ref 7–15)
AST SERPL W P-5'-P-CCNC: 18 U/L (ref 0–45)
BASOPHILS # BLD AUTO: 0 10E3/UL (ref 0–0.2)
BASOPHILS NFR BLD AUTO: 1 %
BILIRUB SERPL-MCNC: 0.5 MG/DL
BUN SERPL-MCNC: 12.1 MG/DL (ref 6–20)
CALCIUM SERPL-MCNC: 9.1 MG/DL (ref 8.8–10.4)
CHLORIDE SERPL-SCNC: 106 MMOL/L (ref 98–107)
CHOLEST SERPL-MCNC: 163 MG/DL
CREAT SERPL-MCNC: 0.65 MG/DL (ref 0.51–0.95)
EGFRCR SERPLBLD CKD-EPI 2021: >90 ML/MIN/1.73M2
EOSINOPHIL # BLD AUTO: 0 10E3/UL (ref 0–0.7)
EOSINOPHIL NFR BLD AUTO: 1 %
ERYTHROCYTE [DISTWIDTH] IN BLOOD BY AUTOMATED COUNT: 14.5 % (ref 10–15)
FASTING STATUS PATIENT QL REPORTED: YES
FASTING STATUS PATIENT QL REPORTED: YES
FOLATE SERPL-MCNC: 8.5 NG/ML (ref 4.6–34.8)
GLUCOSE SERPL-MCNC: 113 MG/DL (ref 70–99)
HCO3 SERPL-SCNC: 24 MMOL/L (ref 22–29)
HCT VFR BLD AUTO: 30.8 % (ref 35–47)
HDLC SERPL-MCNC: 45 MG/DL
HGB BLD-MCNC: 10.8 G/DL (ref 11.7–15.7)
IMM GRANULOCYTES # BLD: 0 10E3/UL
IMM GRANULOCYTES NFR BLD: 0 %
LDLC SERPL CALC-MCNC: 92 MG/DL
LYMPHOCYTES # BLD AUTO: 1.6 10E3/UL (ref 0.8–5.3)
LYMPHOCYTES NFR BLD AUTO: 72 %
MAGNESIUM SERPL-MCNC: 2.4 MG/DL (ref 1.7–2.3)
MCH RBC QN AUTO: 40.4 PG (ref 26.5–33)
MCHC RBC AUTO-ENTMCNC: 35.1 G/DL (ref 31.5–36.5)
MCV RBC AUTO: 115 FL (ref 78–100)
MONOCYTES # BLD AUTO: 0.3 10E3/UL (ref 0–1.3)
MONOCYTES NFR BLD AUTO: 12 %
NEUTROPHILS # BLD AUTO: 0.3 10E3/UL (ref 1.6–8.3)
NEUTROPHILS NFR BLD AUTO: 15 %
NONHDLC SERPL-MCNC: 118 MG/DL
NRBC # BLD AUTO: 0 10E3/UL
NRBC BLD AUTO-RTO: 1 /100
PLAT MORPH BLD: NORMAL
PLATELET # BLD AUTO: 185 10E3/UL (ref 150–450)
POTASSIUM SERPL-SCNC: 4.1 MMOL/L (ref 3.4–5.3)
PROT SERPL-MCNC: 7.3 G/DL (ref 6.4–8.3)
RBC # BLD AUTO: 2.67 10E6/UL (ref 3.8–5.2)
RBC MORPH BLD: NORMAL
SODIUM SERPL-SCNC: 141 MMOL/L (ref 135–145)
TRIGL SERPL-MCNC: 130 MG/DL
VIT B12 SERPL-MCNC: 1137 PG/ML (ref 232–1245)
WBC # BLD AUTO: 2.2 10E3/UL (ref 4–11)

## 2025-01-14 PROCEDURE — 80061 LIPID PANEL: CPT | Mod: ZL | Performed by: STUDENT IN AN ORGANIZED HEALTH CARE EDUCATION/TRAINING PROGRAM

## 2025-01-14 PROCEDURE — 99406 BEHAV CHNG SMOKING 3-10 MIN: CPT | Performed by: STUDENT IN AN ORGANIZED HEALTH CARE EDUCATION/TRAINING PROGRAM

## 2025-01-14 PROCEDURE — 82465 ASSAY BLD/SERUM CHOLESTEROL: CPT | Mod: ZL | Performed by: STUDENT IN AN ORGANIZED HEALTH CARE EDUCATION/TRAINING PROGRAM

## 2025-01-14 PROCEDURE — 82746 ASSAY OF FOLIC ACID SERUM: CPT | Mod: ZL | Performed by: STUDENT IN AN ORGANIZED HEALTH CARE EDUCATION/TRAINING PROGRAM

## 2025-01-14 PROCEDURE — 36415 COLL VENOUS BLD VENIPUNCTURE: CPT | Mod: ZL | Performed by: STUDENT IN AN ORGANIZED HEALTH CARE EDUCATION/TRAINING PROGRAM

## 2025-01-14 PROCEDURE — 85014 HEMATOCRIT: CPT | Mod: ZL | Performed by: STUDENT IN AN ORGANIZED HEALTH CARE EDUCATION/TRAINING PROGRAM

## 2025-01-14 PROCEDURE — 82310 ASSAY OF CALCIUM: CPT | Mod: ZL | Performed by: STUDENT IN AN ORGANIZED HEALTH CARE EDUCATION/TRAINING PROGRAM

## 2025-01-14 PROCEDURE — 85004 AUTOMATED DIFF WBC COUNT: CPT | Mod: ZL | Performed by: STUDENT IN AN ORGANIZED HEALTH CARE EDUCATION/TRAINING PROGRAM

## 2025-01-14 PROCEDURE — 82607 VITAMIN B-12: CPT | Mod: ZL | Performed by: STUDENT IN AN ORGANIZED HEALTH CARE EDUCATION/TRAINING PROGRAM

## 2025-01-14 PROCEDURE — 83735 ASSAY OF MAGNESIUM: CPT | Mod: ZL | Performed by: STUDENT IN AN ORGANIZED HEALTH CARE EDUCATION/TRAINING PROGRAM

## 2025-01-14 PROCEDURE — 84155 ASSAY OF PROTEIN SERUM: CPT | Mod: ZL | Performed by: STUDENT IN AN ORGANIZED HEALTH CARE EDUCATION/TRAINING PROGRAM

## 2025-01-14 PROCEDURE — 250N000011 HC RX IP 250 OP 636: Mod: JZ | Performed by: STUDENT IN AN ORGANIZED HEALTH CARE EDUCATION/TRAINING PROGRAM

## 2025-01-14 PROCEDURE — 96372 THER/PROPH/DIAG INJ SC/IM: CPT | Performed by: STUDENT IN AN ORGANIZED HEALTH CARE EDUCATION/TRAINING PROGRAM

## 2025-01-14 PROCEDURE — G0463 HOSPITAL OUTPT CLINIC VISIT: HCPCS | Mod: 25 | Performed by: STUDENT IN AN ORGANIZED HEALTH CARE EDUCATION/TRAINING PROGRAM

## 2025-01-14 RX ORDER — KETOROLAC TROMETHAMINE 30 MG/ML
30 INJECTION, SOLUTION INTRAMUSCULAR; INTRAVENOUS ONCE
Status: COMPLETED | OUTPATIENT
Start: 2025-01-14 | End: 2025-01-14

## 2025-01-14 RX ORDER — HYDROCODONE BITARTRATE AND ACETAMINOPHEN 5; 325 MG/1; MG/1
1 TABLET ORAL
Qty: 10 TABLET | Refills: 0 | Status: SHIPPED | OUTPATIENT
Start: 2025-01-14

## 2025-01-14 RX ORDER — CYANOCOBALAMIN 1000 UG/ML
1000 INJECTION, SOLUTION INTRAMUSCULAR; SUBCUTANEOUS ONCE
Status: COMPLETED | OUTPATIENT
Start: 2025-01-14 | End: 2025-01-14

## 2025-01-14 RX ADMIN — CYANOCOBALAMIN 1000 MCG: 1000 INJECTION, SOLUTION INTRAMUSCULAR; SUBCUTANEOUS at 11:01

## 2025-01-14 RX ADMIN — KETOROLAC TROMETHAMINE 30 MG: 30 INJECTION, SOLUTION INTRAMUSCULAR at 11:01

## 2025-01-14 ASSESSMENT — ANXIETY QUESTIONNAIRES
IF YOU CHECKED OFF ANY PROBLEMS ON THIS QUESTIONNAIRE, HOW DIFFICULT HAVE THESE PROBLEMS MADE IT FOR YOU TO DO YOUR WORK, TAKE CARE OF THINGS AT HOME, OR GET ALONG WITH OTHER PEOPLE: NOT DIFFICULT AT ALL
GAD7 TOTAL SCORE: 0
6. BECOMING EASILY ANNOYED OR IRRITABLE: NOT AT ALL
GAD7 TOTAL SCORE: 0
3. WORRYING TOO MUCH ABOUT DIFFERENT THINGS: NOT AT ALL
7. FEELING AFRAID AS IF SOMETHING AWFUL MIGHT HAPPEN: NOT AT ALL
1. FEELING NERVOUS, ANXIOUS, OR ON EDGE: NOT AT ALL
GAD7 TOTAL SCORE: 0
4. TROUBLE RELAXING: NOT AT ALL
8. IF YOU CHECKED OFF ANY PROBLEMS, HOW DIFFICULT HAVE THESE MADE IT FOR YOU TO DO YOUR WORK, TAKE CARE OF THINGS AT HOME, OR GET ALONG WITH OTHER PEOPLE?: NOT DIFFICULT AT ALL
7. FEELING AFRAID AS IF SOMETHING AWFUL MIGHT HAPPEN: NOT AT ALL
5. BEING SO RESTLESS THAT IT IS HARD TO SIT STILL: NOT AT ALL
2. NOT BEING ABLE TO STOP OR CONTROL WORRYING: NOT AT ALL

## 2025-01-14 ASSESSMENT — ASTHMA QUESTIONNAIRES
ACT_TOTALSCORE: 22
QUESTION_4 LAST FOUR WEEKS HOW OFTEN HAVE YOU USED YOUR RESCUE INHALER OR NEBULIZER MEDICATION (SUCH AS ALBUTEROL): ONCE A WEEK OR LESS
QUESTION_5 LAST FOUR WEEKS HOW WOULD YOU RATE YOUR ASTHMA CONTROL: WELL CONTROLLED
ACT_TOTALSCORE: 22
QUESTION_1 LAST FOUR WEEKS HOW MUCH OF THE TIME DID YOUR ASTHMA KEEP YOU FROM GETTING AS MUCH DONE AT WORK, SCHOOL OR AT HOME: NONE OF THE TIME
QUESTION_3 LAST FOUR WEEKS HOW OFTEN DID YOUR ASTHMA SYMPTOMS (WHEEZING, COUGHING, SHORTNESS OF BREATH, CHEST TIGHTNESS OR PAIN) WAKE YOU UP AT NIGHT OR EARLIER THAN USUAL IN THE MORNING: ONCE OR TWICE
QUESTION_2 LAST FOUR WEEKS HOW OFTEN HAVE YOU HAD SHORTNESS OF BREATH: NOT AT ALL

## 2025-01-14 ASSESSMENT — PAIN SCALES - GENERAL: PAINLEVEL_OUTOF10: EXTREME PAIN (8)

## 2025-01-14 NOTE — LETTER
January 14, 2025      Alejandra Villegas  [address]  [place] [address]        Dear ,    We are writing to inform you of your test results.    Test results indicate you may require additional follow up, see comment below.  Referred to Hematology due to abnormality of WBCs and RBCs.    Resulted Orders   Lipid Panel   Result Value Ref Range    Cholesterol 163 <200 mg/dL    Triglycerides 130 <150 mg/dL    Direct Measure HDL 45 (L) >=50 mg/dL    LDL Cholesterol Calculated 92 <100 mg/dL    Non HDL Cholesterol 118 <130 mg/dL    Patient Fasting > 8hrs? Yes     Narrative    Cholesterol  Desirable: < 200 mg/dL  Borderline High: 200 - 239 mg/dL  High: >= 240 mg/dL    Triglycerides  Normal: < 150 mg/dL  Borderline High: 150 - 199 mg/dL  High: 200-499 mg/dL  Very High: >= 500 mg/dL    Direct Measure HDL  Female: >= 50 mg/dL   Male: >= 40 mg/dL    LDL Cholesterol  Desirable: < 100 mg/dL  Above Desirable: 100 - 129 mg/dL   Borderline High: 130 - 159 mg/dL   High:  160 - 189 mg/dL   Very High: >= 190 mg/dL    Non HDL Cholesterol  Desirable: < 130 mg/dL  Above Desirable: 130 - 159 mg/dL  Borderline High: 160 - 189 mg/dL  High: 190 - 219 mg/dL  Very High: >= 220 mg/dL   Comprehensive metabolic panel   Result Value Ref Range    Sodium 141 135 - 145 mmol/L    Potassium 4.1 3.4 - 5.3 mmol/L    Carbon Dioxide (CO2) 24 22 - 29 mmol/L    Anion Gap 11 7 - 15 mmol/L    Urea Nitrogen 12.1 6.0 - 20.0 mg/dL    Creatinine 0.65 0.51 - 0.95 mg/dL    GFR Estimate >90 >60 mL/min/1.73m2      Comment:      eGFR calculated using 2021 CKD-EPI equation.    Calcium 9.1 8.8 - 10.4 mg/dL      Comment:      Reference intervals for this test were updated on 7/16/2024 to reflect our healthy population more accurately. There may be differences in the flagging of prior results with similar values performed with this method. Those prior results can be interpreted in the context of the updated reference intervals.    Chloride 106 98 - 107 mmol/L     Glucose 113 (H) 70 - 99 mg/dL    Alkaline Phosphatase 124 40 - 150 U/L    AST 18 0 - 45 U/L    ALT 17 0 - 50 U/L    Protein Total 7.3 6.4 - 8.3 g/dL    Albumin 4.1 3.5 - 5.2 g/dL    Bilirubin Total 0.5 <=1.2 mg/dL    Patient Fasting > 8hrs? Yes    Magnesium   Result Value Ref Range    Magnesium 2.4 (H) 1.7 - 2.3 mg/dL   Vitamin B12   Result Value Ref Range    Vitamin B12 1,137 232 - 1,245 pg/mL   Folic Acid   Result Value Ref Range    Folic Acid 8.5 4.6 - 34.8 ng/mL   CBC with platelets and differential   Result Value Ref Range    WBC Count 2.2 (L) 4.0 - 11.0 10e3/uL    RBC Count 2.67 (L) 3.80 - 5.20 10e6/uL    Hemoglobin 10.8 (L) 11.7 - 15.7 g/dL    Hematocrit 30.8 (L) 35.0 - 47.0 %     (H) 78 - 100 fL    MCH 40.4 (H) 26.5 - 33.0 pg    MCHC 35.1 31.5 - 36.5 g/dL    RDW 14.5 10.0 - 15.0 %    Platelet Count 185 150 - 450 10e3/uL    % Neutrophils 15 %    % Lymphocytes 72 %    % Monocytes 12 %    % Eosinophils 1 %    % Basophils 1 %    % Immature Granulocytes 0 %    NRBCs per 100 WBC 1 (H) <1 /100    Absolute Neutrophils 0.3 (LL) 1.6 - 8.3 10e3/uL    Absolute Lymphocytes 1.6 0.8 - 5.3 10e3/uL    Absolute Monocytes 0.3 0.0 - 1.3 10e3/uL    Absolute Eosinophils 0.0 0.0 - 0.7 10e3/uL    Absolute Basophils 0.0 0.0 - 0.2 10e3/uL    Absolute Immature Granulocytes 0.0 <=0.4 10e3/uL    Absolute NRBCs 0.0 10e3/uL   RBC and Platelet Morphology   Result Value Ref Range    RBC Morphology Confirmed RBC Indices     Platelet Assessment  Automated Count Confirmed. Platelet morphology is normal.     Automated Count Confirmed. Platelet morphology is normal.       If you have any questions or concerns, please call the clinic at the number listed above.       Sincerely,      Ang Gilman, DO    Electronically signed

## 2025-01-14 NOTE — PROGRESS NOTES
Assessment & Plan     (G89.18) Postoperative pain  (primary encounter diagnosis)  Comment: removal of fibroma from left foot on 12/19/24  Plan: HYDROcodone-acetaminophen (NORCO) 5-325 MG         tablet, ketorolac (TORADOL) injection 30 mg    (D53.9) Macrocytic anemia  Comment: likely B12 def  Plan: Lipid Panel, Comprehensive metabolic panel,         Magnesium, CBC and Differential, Vitamin B12,         Folic Acid, cyanocobalamin injection 1,000 mcg    (I10) Essential hypertension  Comment: continue lisinopril 10mg  Plan: Lipid Panel, Comprehensive metabolic panel,         Magnesium, CBC and Differential, Vitamin B12,         Folic Acid          (R73.03) Prediabetes  Comment: A1c 6  Plan: Lipid Panel          (E78.2) Mixed hyperlipidemia  Comment: mostly controlled with atorvastatin  Plan: Lipid Panel          (N18.2) CKD (chronic kidney disease) stage 2, GFR 60-89 ml/min  Comment: hydrate and limit/avoid nephrotoxins  Plan: Comprehensive metabolic panel, Magnesium, CBC         and Differential          (R25.2) Muscle cramp  Comment: legs, usually at night  Plan: Comprehensive metabolic panel, Magnesium, CBC         and Differential, Vitamin B12, Folic Acid          (J43.9) Pulmonary emphysema, unspecified emphysema type (H)  Comment: continue albuterol and symbicort    (M51.379) Degenerative disc disease at L5-S1 level  Comment: continue Tylenol 1000mg TID  Plan: ketorolac (TORADOL) injection 30 mg    (F39) Mood disorder   Comment: primarily anxiety >depression. Continue paxil; consider cymbalta to help pain as well    (Z23) Need for Tdap vaccination  Plan: Tdap, tetanus-diptheria-acell pertussis, (BOOSTRIX) 5-2.5-18.5 LF-MCG/0.5 CHIOMA injection, to be given by pharmacy          (Z72.0) Tobacco user  (F17.218) Cigarette nicotine dependence with other nicotine-induced disorder  Comment: 44pack-yrs; defers LDCT  Plan: SMOKING CESSATION COUNSELING 3-10 MIN          (E66.811,  Z68.31) Class 1 obesity with serious  "comorbidity and body mass index (BMI) of 31.0 to 31.9 in adult, unspecified obesity type  Comment: encouraged weight loss with healthy diet and gradual increase in activity as post op restrictions allow        Nicotine/Tobacco Cessation  She reports that she has been smoking cigarettes. She started smoking about 44 years ago. She has a 44 pack-year smoking history. She has never used smokeless tobacco.  Nicotine/Tobacco Cessation Plan  Information offered: Patient not interested at this time      BMI  Estimated body mass index is 31.76 kg/m  as calculated from the following:    Height as of this encounter: 1.626 m (5' 4\").    Weight as of this encounter: 83.9 kg (185 lb).   Weight management plan: Discussed healthy diet and exercise guidelines          Return in about 4 weeks (around 2/11/2025) for Routine preventive + .    eSda Roberts is a 59 year old, presenting for the following health issues:  Follow Up (L foot)      1/14/2025    10:04 AM   Additional Questions   Roomed by amelia glass lpn     HPI   59-year-old female with HLD, HTN, prediabetes, CKD, COPD, GERD, DDD and recent surgery on her foot who presents for follow-up.  Postsurgical pain of left foot interfering with falling asleep; also having cramps.  Taking Tylenol every 4 hours and ibuprofen as needed.  Also taking aspirin twice a day.  Was prescribed oxycodone by surgeon but is out.  Takes gabapentin twice a day for back pain as well.  Lisinopril has been mostly controlling blood pressure.  Takes atorvastatin for cholesterol.  Not taking anything to help control blood sugars.  Defers starting metformin.  Will try lifestyle changes.  COPD controlled with albuterol at least daily and Symbicort twice a day.  Has not been taking prescribed vitamin D.  Admits depression and states anxiety is mostly controlled with Paxil.    Review of Systems  Constitutional, HEENT, cardiovascular, pulmonary, GI, , musculoskeletal, neuro, skin, endocrine and psych " "systems are negative, except as otherwise noted.      Objective    /76 (BP Location: Right arm, Patient Position: Sitting, Cuff Size: Adult Regular)   Pulse 75   Temp 98.8  F (37.1  C) (Temporal)   Resp 16   Ht 1.626 m (5' 4\")   Wt 83.9 kg (185 lb)   LMP 01/01/2007 (Approximate)   SpO2 94%   BMI 31.76 kg/m    Body mass index is 31.76 kg/m .  Physical Exam  Vitals and nursing note reviewed.   Constitutional:       General: She is not in acute distress.  HENT:      Head: Normocephalic.   Cardiovascular:      Rate and Rhythm: Normal rate and regular rhythm.      Heart sounds: No murmur heard.     No friction rub. No gallop.   Pulmonary:      Effort: Pulmonary effort is normal.      Breath sounds: No wheezing, rhonchi or rales.   Abdominal:      General: Bowel sounds are normal.      Tenderness: There is no abdominal tenderness.   Musculoskeletal:      Comments: Wearing offloading boot on left   Skin:     Findings: Lesion: surgical, healing.   Neurological:      Mental Status: She is alert.   Psychiatric:         Mood and Affect: Mood is depressed.          Admission on 12/19/2024, Discharged on 12/19/2024   Component Date Value Ref Range Status    GLUCOSE BY METER POCT 12/19/2024 101 (H)  70 - 99 mg/dL Final    Case Report 12/19/2024    Final                    Value:Surgical Pathology Report                         Case: [id number]                                  Authorizing Provider:  Rl Nathan DPM        Collected:           12/19/2024 07:51 AM          Ordering Location:     [facility] and    Received:            12/19/2024 09:15 AM                                 Hospital                                                                     Pathologist:           Lab, External [facility]                                                                                   Pathologist                                                                  Specimen:    Foot, Left, PLANTAR FIBROMA LEFT FOOT "                                                      Final Diagnosis 12/19/2024    Final                    Value:See scanned report       Clinical Information 12/19/2024    Final                    Value:Procedure:  excision of soft tissue mass left foot,  gastroc recession - Left  Pre-op Diagnosis: Left foot pain [M79.672]  Post-op Diagnosis: M79.672 - Left foot pain [ICD-10-CM]       Results for orders placed or performed in visit on 01/14/25 (from the past 24 hours)   Lipid Panel   Result Value Ref Range    Cholesterol 163 <200 mg/dL    Triglycerides 130 <150 mg/dL    Direct Measure HDL 45 (L) >=50 mg/dL    LDL Cholesterol Calculated 92 <100 mg/dL    Non HDL Cholesterol 118 <130 mg/dL    Patient Fasting > 8hrs? Yes     Narrative    Cholesterol  Desirable: < 200 mg/dL  Borderline High: 200 - 239 mg/dL  High: >= 240 mg/dL    Triglycerides  Normal: < 150 mg/dL  Borderline High: 150 - 199 mg/dL  High: 200-499 mg/dL  Very High: >= 500 mg/dL    Direct Measure HDL  Female: >= 50 mg/dL   Male: >= 40 mg/dL    LDL Cholesterol  Desirable: < 100 mg/dL  Above Desirable: 100 - 129 mg/dL   Borderline High: 130 - 159 mg/dL   High:  160 - 189 mg/dL   Very High: >= 190 mg/dL    Non HDL Cholesterol  Desirable: < 130 mg/dL  Above Desirable: 130 - 159 mg/dL  Borderline High: 160 - 189 mg/dL  High: 190 - 219 mg/dL  Very High: >= 220 mg/dL   Comprehensive metabolic panel   Result Value Ref Range    Sodium 141 135 - 145 mmol/L    Potassium 4.1 3.4 - 5.3 mmol/L    Carbon Dioxide (CO2) 24 22 - 29 mmol/L    Anion Gap 11 7 - 15 mmol/L    Urea Nitrogen 12.1 6.0 - 20.0 mg/dL    Creatinine 0.65 0.51 - 0.95 mg/dL    GFR Estimate >90 >60 mL/min/1.73m2    Calcium 9.1 8.8 - 10.4 mg/dL    Chloride 106 98 - 107 mmol/L    Glucose 113 (H) 70 - 99 mg/dL    Alkaline Phosphatase 124 40 - 150 U/L    AST 18 0 - 45 U/L    ALT 17 0 - 50 U/L    Protein Total 7.3 6.4 - 8.3 g/dL    Albumin 4.1 3.5 - 5.2 g/dL    Bilirubin Total 0.5 <=1.2 mg/dL    Patient  Fasting > 8hrs? Yes    Magnesium   Result Value Ref Range    Magnesium 2.4 (H) 1.7 - 2.3 mg/dL   CBC and Differential    Narrative    The following orders were created for panel order CBC and Differential.  Procedure                               Abnormality         Status                     ---------                               -----------         ------                     CBC with platelets and d...[[id number]]  Abnormal            Final result               RBC and Platelet Morphology[[id number]]                      Final result                 Please view results for these tests on the individual orders.   Vitamin B12   Result Value Ref Range    Vitamin B12 1,137 232 - 1,245 pg/mL   Folic Acid   Result Value Ref Range    Folic Acid 8.5 4.6 - 34.8 ng/mL   CBC with platelets and differential   Result Value Ref Range    WBC Count 2.2 (L) 4.0 - 11.0 10e3/uL    RBC Count 2.67 (L) 3.80 - 5.20 10e6/uL    Hemoglobin 10.8 (L) 11.7 - 15.7 g/dL    Hematocrit 30.8 (L) 35.0 - 47.0 %     (H) 78 - 100 fL    MCH 40.4 (H) 26.5 - 33.0 pg    MCHC 35.1 31.5 - 36.5 g/dL    RDW 14.5 10.0 - 15.0 %    Platelet Count 185 150 - 450 10e3/uL    % Neutrophils 15 %    % Lymphocytes 72 %    % Monocytes 12 %    % Eosinophils 1 %    % Basophils 1 %    % Immature Granulocytes 0 %    NRBCs per 100 WBC 1 (H) <1 /100    Absolute Neutrophils 0.3 (LL) 1.6 - 8.3 10e3/uL    Absolute Lymphocytes 1.6 0.8 - 5.3 10e3/uL    Absolute Monocytes 0.3 0.0 - 1.3 10e3/uL    Absolute Eosinophils 0.0 0.0 - 0.7 10e3/uL    Absolute Basophils 0.0 0.0 - 0.2 10e3/uL    Absolute Immature Granulocytes 0.0 <=0.4 10e3/uL    Absolute NRBCs 0.0 10e3/uL   RBC and Platelet Morphology   Result Value Ref Range    RBC Morphology Confirmed RBC Indices     Platelet Assessment  Automated Count Confirmed. Platelet morphology is normal.     Automated Count Confirmed. Platelet morphology is normal.           Moderate medical decision making with acute and chronic conditions to  be considered, review of medical records, counseling, ordered test, and ordered medications.  I spent a total of 58 minutes on the day of the visit.  25 minutes spent face-to-face with patient with 3 minutes of that dedicated to nicotine cessation counseling.  Total time spent by me today doing chart review, history and exam, documentation and further activities per the note on day of encounter.    The longitudinal plan of care for the diagnosis(es)/condition(s) as documented were addressed during this visit. Due to the added complexity in care, I will continue to support Alejandra in the subsequent management and with ongoing continuity of care.    Signed Electronically by: Ang Gilman DO

## 2025-01-14 NOTE — NURSING NOTE
"Patient presents to clinic for follow up after foot surgery on 12/19/24. Patient rates pain in L foot today 8/10.    Chief Complaint   Patient presents with    Follow Up     L foot       FOOD SECURITY SCREENING QUESTIONS  Hunger Vital Signs:  Within the past 12 months we worried whether our food would run out before we got money to buy more. Never  Within the past 12 months the food we bought just didn't last and we didn't have money to get more. Never  Consuelojason Quintanaon 1/14/2025 10:08 AM      Initial /76 (BP Location: Right arm, Patient Position: Sitting, Cuff Size: Adult Regular)   Pulse 75   Temp 98.8  F (37.1  C) (Temporal)   Resp 16   Ht 1.626 m (5' 4\")   Wt 83.9 kg (185 lb)   LMP 01/01/2007 (Approximate)   SpO2 94%   BMI 31.76 kg/m   Estimated body mass index is 31.76 kg/m  as calculated from the following:    Height as of this encounter: 1.626 m (5' 4\").    Weight as of this encounter: 83.9 kg (185 lb).  Medication Reconciliation: complete    Consuelo Jackson  "

## 2025-01-15 ENCOUNTER — PATIENT OUTREACH (OUTPATIENT)
Dept: ONCOLOGY | Facility: OTHER | Age: 60
End: 2025-01-15
Payer: MEDICARE

## 2025-01-15 NOTE — PROGRESS NOTES
Oncology/Hematology Care Coordination - Referral Review    Referred by:  Dr. Ang Gilman    Diagnosis:  macrocytic anemia, leukopenia, neutropenia     Most recent Imaging:      Most recent Lab:  1/14/25    Surgery/Biopsy:      Pathology:      Outside Records:      Lisha Winkler RN on 1/15/2025 at 2:08 PM

## 2025-01-20 RX ORDER — OXYCODONE HYDROCHLORIDE 5 MG/1
TABLET ORAL
Qty: 6 TABLET | Refills: 0 | OUTPATIENT
Start: 2025-01-20

## 2025-01-23 ENCOUNTER — OFFICE VISIT (OUTPATIENT)
Dept: ORTHOPEDICS | Facility: OTHER | Age: 60
End: 2025-01-23
Attending: PODIATRIST
Payer: MEDICARE

## 2025-01-23 DIAGNOSIS — Z09 POSTOPERATIVE FOLLOW-UP: Primary | ICD-10-CM

## 2025-01-23 PROCEDURE — G0463 HOSPITAL OUTPT CLINIC VISIT: HCPCS

## 2025-01-23 NOTE — PROGRESS NOTES
SUBJECTIVE:  Alejandra is here 5 weeks out from left plantar soft tissue mass excision and left gastroc recession she is doing very well no acute concern.  And symptoms continue to improve.  She is better than she was prior.    ROS: Musculoskeletal and general review of systems are negative, per review of previous clinic questionnaire.  Denies SOB and calf pain.    EXAM: Surgical sites well coapted mild surgical site tenderness and scar tissue formation.    IMAGING: No x-rays today    ASSESSMENT: Status post left foot surgery as described    PLAN: I discussed pression treatment.  Patient is doing well she will progress shoe gear and activity as tolerated.  With any concerns she will reappoint.    Rl Nathan DPM

## 2025-01-27 DIAGNOSIS — G89.18 POST-OP PAIN: ICD-10-CM

## 2025-01-29 RX ORDER — ASPIRIN 81 MG/1
81 TABLET, COATED ORAL 2 TIMES DAILY
Qty: 180 TABLET | Refills: 2 | Status: SHIPPED | OUTPATIENT
Start: 2025-01-29

## 2025-01-29 NOTE — TELEPHONE ENCOUNTER
"Chuck Aggie sent Rx request for the following:      Requested Prescriptions   Pending Prescriptions Disp Refills    ASPIRIN LOW DOSE 81 MG EC tablet [Pharmacy Med Name: ASPIRIN 81MG EC TABLET] 60 tablet 0     Sig: TAKE 1 TABLET (81 MG) BY MOUTH 2 TIMES DAILY.          Last Prescription Date:   12/19/24  Last Fill Qty/Refills:         60, R-0    Last Office Visit:              12/09/24 Dr. Gilman \"\"Antiplatelet or Anticoagulation Medication Instructions   - aspirin: Discontinue aspirin 7-10 days prior to procedure to reduce bleeding risk. It should be resumed postoperatively.\"   Future Office visit:      None          Prescription approved per [facility] Refill Protocol.  Kristen Quinn RN on 1/29/2025 at 12:29 PM    "

## 2025-02-06 ENCOUNTER — OFFICE VISIT (OUTPATIENT)
Dept: FAMILY MEDICINE | Facility: OTHER | Age: 60
End: 2025-02-06
Attending: NURSE PRACTITIONER
Payer: MEDICARE

## 2025-02-06 VITALS
BODY MASS INDEX: 29.26 KG/M2 | WEIGHT: 171.4 LBS | HEIGHT: 64 IN | HEART RATE: 76 BPM | SYSTOLIC BLOOD PRESSURE: 105 MMHG | TEMPERATURE: 99.5 F | RESPIRATION RATE: 18 BRPM | DIASTOLIC BLOOD PRESSURE: 68 MMHG | OXYGEN SATURATION: 97 %

## 2025-02-06 DIAGNOSIS — A08.4 VIRAL GASTROENTERITIS: Primary | ICD-10-CM

## 2025-02-06 DIAGNOSIS — R50.9 LOW GRADE FEVER: ICD-10-CM

## 2025-02-06 DIAGNOSIS — R11.2 NAUSEA AND VOMITING, UNSPECIFIED VOMITING TYPE: ICD-10-CM

## 2025-02-06 DIAGNOSIS — D53.9 MACROCYTIC ANEMIA: ICD-10-CM

## 2025-02-06 DIAGNOSIS — K21.9 GASTROESOPHAGEAL REFLUX DISEASE WITHOUT ESOPHAGITIS: ICD-10-CM

## 2025-02-06 DIAGNOSIS — D70.9 NEUTROPENIA, UNSPECIFIED TYPE: ICD-10-CM

## 2025-02-06 DIAGNOSIS — D72.819 LEUKOPENIA, UNSPECIFIED TYPE: ICD-10-CM

## 2025-02-06 LAB
ALBUMIN SERPL BCG-MCNC: 4.4 G/DL (ref 3.5–5.2)
ALP SERPL-CCNC: 195 U/L (ref 40–150)
ALT SERPL W P-5'-P-CCNC: 16 U/L (ref 0–50)
ANION GAP SERPL CALCULATED.3IONS-SCNC: 12 MMOL/L (ref 7–15)
AST SERPL W P-5'-P-CCNC: 18 U/L (ref 0–45)
BASOPHILS # BLD AUTO: 0 10E3/UL (ref 0–0.2)
BASOPHILS NFR BLD AUTO: 0 %
BILIRUB SERPL-MCNC: 0.5 MG/DL
BUN SERPL-MCNC: 11.9 MG/DL (ref 6–20)
CALCIUM SERPL-MCNC: 9.3 MG/DL (ref 8.8–10.4)
CHLORIDE SERPL-SCNC: 99 MMOL/L (ref 98–107)
CREAT SERPL-MCNC: 0.73 MG/DL (ref 0.51–0.95)
CRP SERPL-MCNC: 6.2 MG/L
EGFRCR SERPLBLD CKD-EPI 2021: >90 ML/MIN/1.73M2
EOSINOPHIL # BLD AUTO: 0 10E3/UL (ref 0–0.7)
EOSINOPHIL NFR BLD AUTO: 1 %
ERYTHROCYTE [DISTWIDTH] IN BLOOD BY AUTOMATED COUNT: 13.3 % (ref 10–15)
FLUAV RNA SPEC QL NAA+PROBE: NEGATIVE
FLUBV RNA RESP QL NAA+PROBE: NEGATIVE
GLUCOSE SERPL-MCNC: 115 MG/DL (ref 70–99)
HCO3 SERPL-SCNC: 24 MMOL/L (ref 22–29)
HCT VFR BLD AUTO: 31.5 % (ref 35–47)
HGB BLD-MCNC: 11.1 G/DL (ref 11.7–15.7)
IMM GRANULOCYTES # BLD: 0 10E3/UL
IMM GRANULOCYTES NFR BLD: 1 %
LACTATE SERPL-SCNC: 1.4 MMOL/L (ref 0.7–2)
LYMPHOCYTES # BLD AUTO: 1.6 10E3/UL (ref 0.8–5.3)
LYMPHOCYTES NFR BLD AUTO: 80 %
MCH RBC QN AUTO: 40.5 PG (ref 26.5–33)
MCHC RBC AUTO-ENTMCNC: 35.2 G/DL (ref 31.5–36.5)
MCV RBC AUTO: 115 FL (ref 78–100)
MONOCYTES # BLD AUTO: 0.2 10E3/UL (ref 0–1.3)
MONOCYTES NFR BLD AUTO: 8 %
NEUTROPHILS # BLD AUTO: 0.2 10E3/UL (ref 1.6–8.3)
NEUTROPHILS NFR BLD AUTO: 11 %
NRBC # BLD AUTO: 0 10E3/UL
NRBC BLD AUTO-RTO: 2 /100
PLAT MORPH BLD: ABNORMAL
PLATELET # BLD AUTO: 169 10E3/UL (ref 150–450)
POTASSIUM SERPL-SCNC: 4.4 MMOL/L (ref 3.4–5.3)
PROT SERPL-MCNC: 7.7 G/DL (ref 6.4–8.3)
RBC # BLD AUTO: 2.74 10E6/UL (ref 3.8–5.2)
RBC MORPH BLD: ABNORMAL
RSV RNA SPEC NAA+PROBE: NEGATIVE
S PYO DNA THROAT QL NAA+PROBE: NOT DETECTED
SARS-COV-2 RNA RESP QL NAA+PROBE: NEGATIVE
SODIUM SERPL-SCNC: 135 MMOL/L (ref 135–145)
VARIANT LYMPHS BLD QL SMEAR: PRESENT
WBC # BLD AUTO: 2 10E3/UL (ref 4–11)

## 2025-02-06 PROCEDURE — 87651 STREP A DNA AMP PROBE: CPT | Mod: ZL

## 2025-02-06 PROCEDURE — 85041 AUTOMATED RBC COUNT: CPT | Mod: ZL

## 2025-02-06 PROCEDURE — 36415 COLL VENOUS BLD VENIPUNCTURE: CPT | Mod: ZL

## 2025-02-06 PROCEDURE — 83605 ASSAY OF LACTIC ACID: CPT | Mod: ZL

## 2025-02-06 PROCEDURE — 82040 ASSAY OF SERUM ALBUMIN: CPT | Mod: ZL

## 2025-02-06 PROCEDURE — 87637 SARSCOV2&INF A&B&RSV AMP PRB: CPT | Mod: ZL

## 2025-02-06 PROCEDURE — 84155 ASSAY OF PROTEIN SERUM: CPT | Mod: ZL

## 2025-02-06 PROCEDURE — 86140 C-REACTIVE PROTEIN: CPT | Mod: ZL

## 2025-02-06 PROCEDURE — 85004 AUTOMATED DIFF WBC COUNT: CPT | Mod: ZL

## 2025-02-06 PROCEDURE — 82435 ASSAY OF BLOOD CHLORIDE: CPT | Mod: ZL

## 2025-02-06 PROCEDURE — G0463 HOSPITAL OUTPT CLINIC VISIT: HCPCS

## 2025-02-06 RX ORDER — ONDANSETRON 4 MG/1
4 TABLET, ORALLY DISINTEGRATING ORAL EVERY 8 HOURS PRN
Qty: 15 TABLET | Refills: 0 | Status: SHIPPED | OUTPATIENT
Start: 2025-02-06

## 2025-02-06 RX ORDER — OMEPRAZOLE 20 MG/1
20 CAPSULE, DELAYED RELEASE ORAL DAILY
Qty: 14 CAPSULE | Refills: 0 | Status: SHIPPED | OUTPATIENT
Start: 2025-02-06 | End: 2025-02-20

## 2025-02-06 ASSESSMENT — PAIN SCALES - GENERAL: PAINLEVEL_OUTOF10: NO PAIN (0)

## 2025-02-06 NOTE — NURSING NOTE
DATE/TIME OF CALL RECEIVED FROM LAB:  02/06/25 at 1:30 PM   LAB TEST:  unknown    LAB VALUE:  unknown   PROVIDER NOTIFIED?: Yes  PROVIDER NAME: Leonard Ambriz  DATE/TIME LAB VALUE REPORTED TO PROVIDER: Today at 1:30pm   MECHANISM OF PROVIDER NOTIFICATION: Face-To-Face  PROVIDER RESPONSE: Provider to Notify Patient

## 2025-02-06 NOTE — PROGRESS NOTES
ASSESSMENT/PLAN:    I have reviewed the nursing notes.  I have reviewed the findings, diagnosis, plan and need for follow up with the patient.    1. Viral gastroenteritis (Primary)  2. Nausea and vomiting, unspecified vomiting type  3. Low grade fever  - Group A Streptococcus PCR Throat Swab  - Influenza A/B, RSV and SARS-CoV2 PCR (COVID-19) Nose  - ondansetron (ZOFRAN ODT) 4 MG ODT tab; Take 1 tablet (4 mg) by mouth every 8 hours as needed for nausea or vomiting.  Dispense: 15 tablet; Refill: 0  - Lactic acid whole blood  - CRP inflammation  - Comprehensive Metabolic Panel  - CBC and Differential    Patient presents with an acute illness with systemic and GI symptoms such as a low-grade fever, nausea, and vomiting.  Patient's vitals are stable except for a slightly elevated temp of 99.5  F and she appears nontoxic.  Her strep and multiplex tests were both negative.  CMP and lactic acid are stable.  CRP is slightly elevated.  Discussed with patient that her symptoms are most likely due to viral gastroenteritis.  Advised patient to follow a bland diet and push fluids.  Provided her with a prescription for Zofran to take as needed.    4. Gastroesophageal reflux disease without esophagitis  - omeprazole (PRILOSEC) 20 MG DR capsule; Take 1 capsule (20 mg) by mouth daily for 14 days.  Dispense: 14 capsule; Refill: 0    Patient has been experiencing increased GERD symptoms.  Provided her with a prescription for omeprazole to take for the next 2 weeks.  Follow-up with PCP if GERD symptoms persist.    5. Macrocytic anemia  6. Leukopenia, unspecified type  7. Neutropenia, unspecified type    WBC count continues to trend downward and is currently 2.0 today indicating persistent leukopenia.  Her neutrophils have also decreased down to 2.0 indicating persistent neutropenia.  She also consistent continues to have macrocytic anemia but her RBC count, hemoglobin, and hematocrit have been stable since her last lab draw.  Patient  does have an appointment with hematology/oncology on 2/10/2025.    Discussed warning signs/symptoms indicative of need to f/u    Follow up if symptoms persist or worsen or concerns    I explained my diagnostic considerations and recommendations to the patient, who voiced understanding and agreement with the treatment plan. All questions were answered. We discussed potential side effects of any prescribed or recommended therapies, as well as expectations for response to treatments.    Leonard Ambriz, AMARILIS CNP  2/6/2025  12:38 PM    HPI:    Alejandra Villegas is a 59 year old female  who [facility] concerns of [facility]    Acute Illness  Onset/Duration: had two days of it last week and then it started again yesterday   Symptoms:  Fever: Yes - low grade  Chills/Sweats: No  Headache (location?): No  Sinus Pressure: No  Conjunctivitis:  No  Ear Pain: no  Rhinorrhea: No  Congestion: No  Sore Throat: YES- from vomiting  Cough: no  Wheeze: No  Decreased Appetite: No  Nausea: YES  Vomiting: YES  Diarrhea: No  Dysuria/Freq.: No  Dysuria or Hematuria: No  Fatigue/Achiness: YES  Sick/Strep Exposure: No  Therapies tried and outcome: Tylenol      Past Medical History:   Diagnosis Date    Allergic rhinitis 09/27/2011         Disorder of kidney and ureter 08/08/2008    Kidney disease GFR 87    Dorsalgia     No Comments Provided    Generalized anxiety disorder     No Comments Provided    Hidradenitis suppurativa     No Comments Provided    Other disorders of lung (CODE) 08/01/2007    Pulmonary nodules, stable on follow-up chest CT 12/08.  No further imaging recommended.    Other specified disorders of Eustachian tube, unspecified ear 02/27/2012         Personal history of other medical treatment (CODE)     Childbirth x4    Rheumatoid arthritis (H) 02/27/2012         Tobacco use     No Comments Provided     Past Surgical History:   Procedure Laterality Date    ARTHROSCOPY KNEE  05/2017    Dr Torres     ARTHROSCOPY KNEE  07/20/2017    Dr Garza, lateral release, meniscal repair    ARTHROTOMY WRIST Left 2011    Dr. Torres    CHOLECYSTECTOMY  06/2007    Emergency tracheotomy following failed intubation for laparoscopic cholecystectomy.    DILATION AND CURETTAGE  09/2006         HERNIA REPAIR  2007    Incisoinal hernia repair    HYSTERECTOMY TOTAL ABDOMINAL  02/2010         IMPLANT STIMULATOR AND LEADS SACRAL NERVE (STAGE ONE AND TWO) N/A 12/11/2018    Procedure: Interstim Battery Replacement;  Surgeon: Rl Ambriz MD;  Location: [facility] OR    INTERSTIM DEVICE STAGE 2  01/06/2014    Dr. Ambriz - [facility]    LAPAROSCOPIC ABLATION ENDOMETRIOSIS  09/2006         OOPHORECTOMY Left 2010     Dr Thorpe    RECONSTRUCT FOREFOOT WITH METATARSOPHALANGEAL (MTP) FUSION Right 5/5/2022    Procedure: Right fibular sesmoid excision and 1st metatarsal phalangeal joint fusion;  Surgeon: Rl Nathan DPM;  Location: [facility] OR    RELEASE CARPAL TUNNEL  02/02/2017         RELEASE PLANTAR FASCIA Left 12/19/2024    Procedure: excision of soft tissue mass left foot,  gastroc recession;  Surgeon: Rl Nathan DPM;  Location:  OR    REMOVE HARDWARE FOOT Right 8/10/2023    Procedure: REMOVAL, HARDWARE, FOOT;  Surgeon: Rl Nathan DPM;  Location: [facility] OR    SALPINGO OOPHORECTOMY,R/L/KELSEY Right 06/1999    Right salpingo-oophorectomy for hemorrhagic corpus luteum cyst    TRACHEOSTOMY  2007    emergency following failed intubation during cholecystectomy    TYMPANOSTOMY, LOCAL/TOPICAL ANESTHESIA  08/2006    PE tube placement     Social History     Tobacco Use    Smoking status: Every Day     Current packs/day: 1.00     Average packs/day: 1 pack/day for 44.1 years (44.1 ttl pk-yrs)     Types: Cigarettes     Start date: 1981    Smokeless tobacco: Never   Substance Use Topics    Alcohol use: No     Alcohol/week: 0.0 standard drinks of alcohol     Current Outpatient Medications   Medication Sig Dispense Refill    albuterol (PROAIR HFA/PROVENTIL  HFA/VENTOLIN HFA) 108 (90 Base) MCG/ACT inhaler Inhale 1-2 puffs into the lungs every 4 hours as needed for shortness of breath, wheezing or cough 18 g 4    aspirin (ASPIRIN LOW DOSE) 81 MG EC tablet TAKE 1 TABLET (81 MG) BY MOUTH 2 TIMES DAILY. 180 tablet 2    atorvastatin (LIPITOR) 40 MG tablet Take 1 tablet (40 mg) by mouth at bedtime 90 tablet 4    budesonide-formoterol (SYMBICORT) 80-4.5 MCG/ACT Inhaler Inhale 2 puffs into the lungs 2 times daily - Rinse mouth well after use to prevent Thrush 10.2 g 11    cyclobenzaprine (FLEXERIL) 10 MG tablet Take 1 tablet (10 mg) by mouth at bedtime. 30 tablet 0    fluticasone (FLONASE) 50 MCG/ACT nasal spray Spray 2 sprays into both nostrils 2 times daily. 15.8 mL 0    gabapentin (NEURONTIN) 400 MG capsule Take 2 capsules (800 mg) by mouth 2 times daily 360 capsule 4    ipratropium - albuterol 0.5 mg/2.5 mg/3 mL (DUONEB) 0.5-2.5 (3) MG/3ML neb solution Take 1 vial (3 mLs) by nebulization every 4 hours as needed for shortness of breath, wheezing or cough 90 mL 11    lisinopril (ZESTRIL) 10 MG tablet Take 1 tablet (10 mg) by mouth daily 90 tablet 4    order for DME Equipment being ordered: home neb set up with mask and tubing 1 Device 0    PARoxetine (PAXIL) 20 MG tablet Take 1 tablet (20 mg) by mouth every morning 90 tablet 4    SUMAtriptan (IMITREX) 50 MG tablet Take 1 tablet (50 mg) by mouth at onset of headache for migraine May repeat in 2 hours. Max 4 tablets/24 hours. 9 tablet 1    Vitamin D, Cholecalciferol, 25 MCG (1000 UT) CAPS Take 1,000 Units by mouth daily. 90 capsule 1    HYDROcodone-acetaminophen (NORCO) 5-325 MG tablet Take 1 tablet by mouth nightly as needed for severe pain. (Patient not taking: Reported on 2/6/2025) 10 tablet 0    valACYclovir (VALTREX) 1000 mg tablet Take 1 tablet (1,000 mg) by mouth 3 times daily for 7 days. 21 tablet 0     Allergies   Allergen Reactions    Adhesive Tape     Bee Pollen Swelling     Seasonal    Bee Venom Unknown    Folic  "Acid Itching    Meloxicam Unknown    Pollen Extract      Seasonal    Amoxicillin Rash    Liquid Adhesive Rash    Nabumetone GI Disturbance     GI upset     Past medical history, past surgical history, current medications and allergies reviewed and accurate to the best of my knowledge.      ROS:  Refer to HPI    /68 (BP Location: Left arm, Patient Position: Sitting, Cuff Size: Adult Regular)   Pulse 76   Temp 99.5  F (37.5  C) (Temporal)   Resp 18   Ht 1.626 m (5' 4\")   Wt 77.7 kg (171 lb 6.4 oz)   LMP 01/01/2007 (Approximate)   SpO2 97%   BMI 29.42 kg/m      EXAM:  General Appearance: Well appearing 59 year old female, appropriate appearance for age. No acute distress   Eyes: conjunctivae normal without erythema or irritation, corneas clear, no drainage or crusting, no eyelid swelling, pupils equal   Oropharynx: moist mucous membranes, posterior pharynx without erythema, tonsils absent, no post nasal drip seen, no trismus, voice clear.    Nose:  Bilateral nares: no erythema, no edema, no drainage or congestion   Neck: supple without adenopathy  Respiratory: normal chest wall and respirations.  Normal effort.  Clear to auscultation bilaterally, no wheezing, crackles or rhonchi.  No increased work of breathing.  No cough appreciated.  Cardiac: RRR with no murmurs  Abdomen: soft, nontender, no rigidity, no rebound tenderness or guarding, normal bowel sounds present  Musculoskeletal:  Equal movement of bilateral upper extremities.  Equal movement of bilateral lower extremities.  Normal gait.    Dermatological: no rashes noted of exposed skin  Neuro: Alert and oriented to person, place, and time.    Psychological: normal affect, alert, oriented, and pleasant.     Labs:  Results for orders placed or performed in visit on 02/06/25   Influenza A/B, RSV and SARS-CoV2 PCR (COVID-19) Nose     Status: Normal    Specimen: Nose; Swab   Result Value Ref Range    Influenza A PCR Negative Negative    Influenza B PCR " Negative Negative    RSV PCR Negative Negative    SARS CoV2 PCR Negative Negative    Narrative    Testing was performed using the Xpert Xpress CoV2/Flu/RSV Assay on the [employer]pert Instrument. This test should be ordered for the detection of SARS-CoV2, influenza, and RSV viruses in individuals with signs and symptoms of respiratory tract infection. This test is for in vitro diagnostic use under the [place] FDA for laboratories certified under CLIA to perform high or moderate complexity testing. This test has been [place] FDA cleared. A negative result does not rule out the presence of PCR inhibitors in the specimen or target RNA in concentration below the limit of detection for the assay. If only one viral target is positive but coinfection with multiple targets is suspected, the sample should be re-tested with another FDA cleared, approved, or authorized test, if coninfection would change clinical management. This test was validated by the [facility]. These laboratories are certified under the Clinical Laboratory Improvement Amendments of 1988 (CLIA-88) as qualified to perfom high complexity laboratory testing.   Lactic acid whole blood     Status: Normal   Result Value Ref Range    Lactic Acid 1.4 0.7 - 2.0 mmol/L   CRP inflammation     Status: Abnormal   Result Value Ref Range    CRP Inflammation 6.20 (H) <5.00 mg/L   Comprehensive Metabolic Panel     Status: Abnormal   Result Value Ref Range    Sodium 135 135 - 145 mmol/L    Potassium 4.4 3.4 - 5.3 mmol/L    Carbon Dioxide (CO2) 24 22 - 29 mmol/L    Anion Gap 12 7 - 15 mmol/L    Urea Nitrogen 11.9 6.0 - 20.0 mg/dL    Creatinine 0.73 0.51 - 0.95 mg/dL    GFR Estimate >90 >60 mL/min/1.73m2    Calcium 9.3 8.8 - 10.4 mg/dL    Chloride 99 98 - 107 mmol/L    Glucose 115 (H) 70 - 99 mg/dL    Alkaline Phosphatase 195 (H) 40 - 150 U/L    AST 18 0 - 45 U/L    ALT 16 0 - 50 U/L    Protein Total 7.7 6.4 - 8.3 g/dL    Albumin 4.4 3.5 - 5.2 g/dL    Bilirubin  Total 0.5 <=1.2 mg/dL   CBC with platelets and differential     Status: Abnormal   Result Value Ref Range    WBC Count 2.0 (L) 4.0 - 11.0 10e3/uL    RBC Count 2.74 (L) 3.80 - 5.20 10e6/uL    Hemoglobin 11.1 (L) 11.7 - 15.7 g/dL    Hematocrit 31.5 (L) 35.0 - 47.0 %     (H) 78 - 100 fL    MCH 40.5 (H) 26.5 - 33.0 pg    MCHC 35.2 31.5 - 36.5 g/dL    RDW 13.3 10.0 - 15.0 %    Platelet Count 169 150 - 450 10e3/uL    % Neutrophils 11 %    % Lymphocytes 80 %    % Monocytes 8 %    % Eosinophils 1 %    % Basophils 0 %    % Immature Granulocytes 1 %    NRBCs per 100 WBC 2 (H) <1 /100    Absolute Neutrophils 0.2 (LL) 1.6 - 8.3 10e3/uL    Absolute Lymphocytes 1.6 0.8 - 5.3 10e3/uL    Absolute Monocytes 0.2 0.0 - 1.3 10e3/uL    Absolute Eosinophils 0.0 0.0 - 0.7 10e3/uL    Absolute Basophils 0.0 0.0 - 0.2 10e3/uL    Absolute Immature Granulocytes 0.0 <=0.4 10e3/uL    Absolute NRBCs 0.0 10e3/uL   Group A Streptococcus PCR Throat Swab     Status: Normal    Specimen: Throat; Swab   Result Value Ref Range    Group A strep by PCR Not Detected Not Detected    Narrative    The Xpert Xpress Strep A test, performed on the [employer] Systems, is a rapid, qualitative in vitro diagnostic test for the detection of Streptococcus pyogenes (Group A ß-hemolytic Streptococcus, Strep A) in throat swab specimens from patients with signs and symptoms of pharyngitis. The Xpert Xpress Strep A test can be used as an aid in the diagnosis of Group A Streptococcal pharyngitis. The assay is not intended to monitor treatment for Group A Streptococcus infections. The Xpert Xpress Strep A test utilizes an automated real-time polymerase chain reaction (PCR) to detect Streptococcus pyogenes DNA.   CBC and Differential     Status: Abnormal (In process)    Narrative    The following orders were created for panel order CBC and Differential.  Procedure                               Abnormality         Status                     ---------                                -----------         ------                     CBC with platelets and d...[[id number]]  Abnormal            Final result               RBC and Platelet Morphology[[id number]]                      In process                   Please view results for these tests on the individual orders.

## 2025-02-06 NOTE — NURSING NOTE
"Chief Complaint   Patient presents with    Vomiting     yesterday   [facility] for concerns of vomiting. Patient notes symptoms since yesterday.     Initial /68 (BP Location: Left arm, Patient Position: Sitting, Cuff Size: Adult Regular)   Pulse 76   Temp 99.5  F (37.5  C) (Temporal)   Resp 18   Ht 1.626 m (5' 4\")   Wt 77.7 kg (171 lb 6.4 oz)   LMP 01/01/2007 (Approximate)   SpO2 97%   BMI 29.42 kg/m   Estimated body mass index is 29.42 kg/m  as calculated from the following:    Height as of this encounter: 1.626 m (5' 4\").    Weight as of this encounter: 77.7 kg (171 lb 6.4 oz).  Medication Review: complete    The next two questions are to help us understand your food security.  If you are feeling you need any assistance in this area, we have resources available to support you today.          2/6/2025   SDOH- Food Insecurity   Within the past 12 months, did you worry that your food would run out before you got money to buy more? N   Within the past 12 months, did the food you bought just not last and you didn t have money to get more? N         Health Care Directive:  Patient does not have a Health Care Directive: Discussed advance care planning with patient; however, patient declined at this time.    Rosendo Cedeño      "

## 2025-02-06 NOTE — LETTER
February 6, 2025      Alejandra Villegas  [address]   [facility] [address]        To Whom It May Concern:    Alejandra Villegas  was seen on 2/6/25.  Please excuse her due to illness.        Sincerely,        AMARILIS Segura CNP    Electronically signed

## 2025-02-09 DIAGNOSIS — E78.2 MIXED HYPERLIPIDEMIA: ICD-10-CM

## 2025-02-09 DIAGNOSIS — F41.9 CHRONIC ANXIETY: ICD-10-CM

## 2025-02-09 DIAGNOSIS — I10 BENIGN ESSENTIAL HYPERTENSION: ICD-10-CM

## 2025-02-10 ENCOUNTER — ONCOLOGY VISIT (OUTPATIENT)
Dept: ONCOLOGY | Facility: OTHER | Age: 60
End: 2025-02-10
Attending: STUDENT IN AN ORGANIZED HEALTH CARE EDUCATION/TRAINING PROGRAM
Payer: MEDICARE

## 2025-02-10 ENCOUNTER — TELEPHONE (OUTPATIENT)
Dept: ONCOLOGY | Facility: OTHER | Age: 60
End: 2025-02-10

## 2025-02-10 VITALS
RESPIRATION RATE: 16 BRPM | OXYGEN SATURATION: 96 % | BODY MASS INDEX: 29.98 KG/M2 | WEIGHT: 175.6 LBS | TEMPERATURE: 97.3 F | HEIGHT: 64 IN

## 2025-02-10 DIAGNOSIS — Z11.59 ENCOUNTER FOR SCREENING FOR OTHER VIRAL DISEASES: ICD-10-CM

## 2025-02-10 DIAGNOSIS — D53.9 MACROCYTIC ANEMIA: Primary | ICD-10-CM

## 2025-02-10 DIAGNOSIS — Z72.89 OTHER PROBLEMS RELATED TO LIFESTYLE: ICD-10-CM

## 2025-02-10 DIAGNOSIS — D70.9 NEUTROPENIA, UNSPECIFIED TYPE: ICD-10-CM

## 2025-02-10 DIAGNOSIS — D72.819 LEUKOPENIA, UNSPECIFIED TYPE: ICD-10-CM

## 2025-02-10 LAB
ALBUMIN SERPL BCG-MCNC: 4.1 G/DL (ref 3.5–5.2)
ALP SERPL-CCNC: 179 U/L (ref 40–150)
ALT SERPL W P-5'-P-CCNC: 15 U/L (ref 0–50)
ANION GAP SERPL CALCULATED.3IONS-SCNC: 11 MMOL/L (ref 7–15)
AST SERPL W P-5'-P-CCNC: 21 U/L (ref 0–45)
BASOPHILS # BLD MANUAL: 0 10E3/UL (ref 0–0.2)
BASOPHILS NFR BLD MANUAL: 0 %
BILIRUB SERPL-MCNC: 0.7 MG/DL
BUN SERPL-MCNC: 10.5 MG/DL (ref 6–20)
CALCIUM SERPL-MCNC: 8.9 MG/DL (ref 8.8–10.4)
CHLORIDE SERPL-SCNC: 101 MMOL/L (ref 98–107)
CREAT SERPL-MCNC: 0.72 MG/DL (ref 0.51–0.95)
EGFRCR SERPLBLD CKD-EPI 2021: >90 ML/MIN/1.73M2
EOSINOPHIL # BLD MANUAL: 0 10E3/UL (ref 0–0.7)
EOSINOPHIL NFR BLD MANUAL: 0 %
ERYTHROCYTE [DISTWIDTH] IN BLOOD BY AUTOMATED COUNT: 13.3 % (ref 10–15)
FERRITIN SERPL-MCNC: 536 NG/ML (ref 11–328)
FOLATE SERPL-MCNC: 6.9 NG/ML (ref 4.6–34.8)
GLUCOSE SERPL-MCNC: 103 MG/DL (ref 70–99)
HCO3 SERPL-SCNC: 25 MMOL/L (ref 22–29)
HCT VFR BLD AUTO: 26.7 % (ref 35–47)
HGB BLD-MCNC: 9.1 G/DL (ref 11.7–15.7)
IRON BINDING CAPACITY (ROCHE): 296 UG/DL (ref 240–430)
IRON SATN MFR SERPL: 47 % (ref 15–46)
IRON SERPL-MCNC: 140 UG/DL (ref 37–145)
LDH SERPL L TO P-CCNC: 253 U/L (ref 0–250)
LYMPHOCYTES # BLD MANUAL: 1.3 10E3/UL (ref 0.8–5.3)
LYMPHOCYTES NFR BLD MANUAL: 81 %
MCH RBC QN AUTO: 39.9 PG (ref 26.5–33)
MCHC RBC AUTO-ENTMCNC: 34.1 G/DL (ref 31.5–36.5)
MCV RBC AUTO: 117 FL (ref 78–100)
MONOCYTES # BLD MANUAL: 0.1 10E3/UL (ref 0–1.3)
MONOCYTES NFR BLD MANUAL: 5 %
NEUTROPHILS # BLD MANUAL: 0.2 10E3/UL (ref 1.6–8.3)
NEUTROPHILS NFR BLD MANUAL: 14 %
PLAT MORPH BLD: ABNORMAL
PLATELET # BLD AUTO: 154 10E3/UL (ref 150–450)
POTASSIUM SERPL-SCNC: 4.5 MMOL/L (ref 3.4–5.3)
PROT SERPL-MCNC: 7.1 G/DL (ref 6.4–8.3)
RBC # BLD AUTO: 2.28 10E6/UL (ref 3.8–5.2)
RBC MORPH BLD: ABNORMAL
RETICS # AUTO: 0.03 10E6/UL (ref 0.03–0.1)
RETICS/RBC NFR AUTO: 1.3 % (ref 0.5–2)
SODIUM SERPL-SCNC: 137 MMOL/L (ref 135–145)
TOTAL PROTEIN SERUM FOR ELP: 7 G/DL (ref 6.4–8.3)
WBC # BLD AUTO: 1.6 10E3/UL (ref 4–11)

## 2025-02-10 PROCEDURE — 87340 HEPATITIS B SURFACE AG IA: CPT | Mod: ZL | Performed by: INTERNAL MEDICINE

## 2025-02-10 PROCEDURE — 83521 IG LIGHT CHAINS FREE EACH: CPT | Mod: ZL | Performed by: INTERNAL MEDICINE

## 2025-02-10 PROCEDURE — 87389 HIV-1 AG W/HIV-1&-2 AB AG IA: CPT | Mod: ZL | Performed by: INTERNAL MEDICINE

## 2025-02-10 PROCEDURE — 86705 HEP B CORE ANTIBODY IGM: CPT | Mod: ZL | Performed by: INTERNAL MEDICINE

## 2025-02-10 PROCEDURE — 86706 HEP B SURFACE ANTIBODY: CPT | Mod: ZL | Performed by: INTERNAL MEDICINE

## 2025-02-10 PROCEDURE — 86704 HEP B CORE ANTIBODY TOTAL: CPT | Mod: ZL | Performed by: INTERNAL MEDICINE

## 2025-02-10 PROCEDURE — 83010 ASSAY OF HAPTOGLOBIN QUANT: CPT | Mod: ZL | Performed by: INTERNAL MEDICINE

## 2025-02-10 PROCEDURE — 83921 ORGANIC ACID SINGLE QUANT: CPT | Mod: ZL | Performed by: INTERNAL MEDICINE

## 2025-02-10 PROCEDURE — 36415 COLL VENOUS BLD VENIPUNCTURE: CPT | Mod: ZL | Performed by: INTERNAL MEDICINE

## 2025-02-10 PROCEDURE — 84155 ASSAY OF PROTEIN SERUM: CPT | Mod: ZL | Performed by: INTERNAL MEDICINE

## 2025-02-10 PROCEDURE — 85014 HEMATOCRIT: CPT | Mod: ZL | Performed by: INTERNAL MEDICINE

## 2025-02-10 PROCEDURE — 85045 AUTOMATED RETICULOCYTE COUNT: CPT | Mod: ZL | Performed by: INTERNAL MEDICINE

## 2025-02-10 PROCEDURE — 84165 PROTEIN E-PHORESIS SERUM: CPT | Mod: ZL | Performed by: STUDENT IN AN ORGANIZED HEALTH CARE EDUCATION/TRAINING PROGRAM

## 2025-02-10 PROCEDURE — 86334 IMMUNOFIX E-PHORESIS SERUM: CPT | Mod: ZL | Performed by: STUDENT IN AN ORGANIZED HEALTH CARE EDUCATION/TRAINING PROGRAM

## 2025-02-10 PROCEDURE — G0463 HOSPITAL OUTPT CLINIC VISIT: HCPCS

## 2025-02-10 PROCEDURE — 85007 BL SMEAR W/DIFF WBC COUNT: CPT | Mod: ZL | Performed by: INTERNAL MEDICINE

## 2025-02-10 PROCEDURE — 86334 IMMUNOFIX E-PHORESIS SERUM: CPT | Mod: 26 | Performed by: STUDENT IN AN ORGANIZED HEALTH CARE EDUCATION/TRAINING PROGRAM

## 2025-02-10 PROCEDURE — 86038 ANTINUCLEAR ANTIBODIES: CPT | Mod: ZL | Performed by: INTERNAL MEDICINE

## 2025-02-10 PROCEDURE — 80053 COMPREHEN METABOLIC PANEL: CPT | Mod: ZL | Performed by: INTERNAL MEDICINE

## 2025-02-10 PROCEDURE — 83615 LACTATE (LD) (LDH) ENZYME: CPT | Mod: ZL | Performed by: INTERNAL MEDICINE

## 2025-02-10 PROCEDURE — 86803 HEPATITIS C AB TEST: CPT | Mod: ZL | Performed by: INTERNAL MEDICINE

## 2025-02-10 PROCEDURE — 86431 RHEUMATOID FACTOR QUANT: CPT | Mod: ZL | Performed by: INTERNAL MEDICINE

## 2025-02-10 PROCEDURE — 84165 PROTEIN E-PHORESIS SERUM: CPT | Mod: 26 | Performed by: STUDENT IN AN ORGANIZED HEALTH CARE EDUCATION/TRAINING PROGRAM

## 2025-02-10 PROCEDURE — 82746 ASSAY OF FOLIC ACID SERUM: CPT | Mod: ZL | Performed by: INTERNAL MEDICINE

## 2025-02-10 ASSESSMENT — PAIN SCALES - GENERAL: PAINLEVEL_OUTOF10: MILD PAIN (1)

## 2025-02-10 NOTE — PROGRESS NOTES
HEMATOLOGY CONSULT NOTE  Feb 10, 2025    Reason for consult: Anemia and neutropenia    HISTORY OF PRESENT ILLNESS  Alejandra Villegas is a 59 year old female with Pmh as stated below who is seen in the hematology clinic for anemia and neutropenia    Her history in short is as follows:    Ms. Villegas was found to have a WBC count of 2.4 with ANC of 0.3 with a hemoglobin of 12 with MCV of 112. Prior to that was seen to have a hemoglobin of 14.8 with MCV of 95 and WBC of 8.4.     She has had an UTI, Ear infection in Jan 2025. She also had a stomach bug one week ago. Denies any weight or appetite loss. Denies any worsening fatigue, night sweats. Denies any new medications. Denies any heavy alcohol use. Does have a history of rheumatoid arthritis per history.     REVIEW OF SYSTEMS  A 12-point ROS negative except as in HPI      Current Outpatient Medications   Medication Sig Dispense Refill    albuterol (PROAIR HFA/PROVENTIL HFA/VENTOLIN HFA) 108 (90 Base) MCG/ACT inhaler Inhale 1-2 puffs into the lungs every 4 hours as needed for shortness of breath, wheezing or cough 18 g 4    aspirin (ASPIRIN LOW DOSE) 81 MG EC tablet TAKE 1 TABLET (81 MG) BY MOUTH 2 TIMES DAILY. 180 tablet 2    atorvastatin (LIPITOR) 40 MG tablet Take 1 tablet (40 mg) by mouth at bedtime 90 tablet 4    budesonide-formoterol (SYMBICORT) 80-4.5 MCG/ACT Inhaler Inhale 2 puffs into the lungs 2 times daily - Rinse mouth well after use to prevent Thrush 10.2 g 11    cyclobenzaprine (FLEXERIL) 10 MG tablet Take 1 tablet (10 mg) by mouth at bedtime. (Patient taking differently: Take 10 mg by mouth nightly as needed.) 30 tablet 0    gabapentin (NEURONTIN) 400 MG capsule Take 2 capsules (800 mg) by mouth 2 times daily 360 capsule 4    ipratropium - albuterol 0.5 mg/2.5 mg/3 mL (DUONEB) 0.5-2.5 (3) MG/3ML neb solution Take 1 vial (3 mLs) by nebulization every 4 hours as needed for shortness of breath, wheezing or cough 90 mL 11    lisinopril (ZESTRIL) 10 MG  tablet Take 1 tablet (10 mg) by mouth daily 90 tablet 4    omeprazole (PRILOSEC) 20 MG DR capsule Take 1 capsule (20 mg) by mouth daily for 14 days. 14 capsule 0    PARoxetine (PAXIL) 20 MG tablet Take 1 tablet (20 mg) by mouth every morning 90 tablet 4    Vitamin D, Cholecalciferol, 25 MCG (1000 UT) CAPS Take 1,000 Units by mouth daily. 90 capsule 1    fluticasone (FLONASE) 50 MCG/ACT nasal spray Spray 2 sprays into both nostrils 2 times daily. 15.8 mL 0    HYDROcodone-acetaminophen (NORCO) 5-325 MG tablet Take 1 tablet by mouth nightly as needed for severe pain. (Patient not taking: Reported on 2/10/2025) 10 tablet 0    ondansetron (ZOFRAN ODT) 4 MG ODT tab Take 1 tablet (4 mg) by mouth every 8 hours as needed for nausea or vomiting. 15 tablet 0    order for DME Equipment being ordered: home neb set up with mask and tubing 1 Device 0    SUMAtriptan (IMITREX) 50 MG tablet Take 1 tablet (50 mg) by mouth at onset of headache for migraine May repeat in 2 hours. Max 4 tablets/24 hours. 9 tablet 1    valACYclovir (VALTREX) 1000 mg tablet Take 1 tablet (1,000 mg) by mouth 3 times daily for 7 days. 21 tablet 0       Allergies   Allergen Reactions    Adhesive Tape     Bee Pollen Swelling     Seasonal    Bee Venom Unknown    Folic Acid Itching    Meloxicam Unknown    Pollen Extract      Seasonal    Amoxicillin Rash    Liquid Adhesive Rash    Nabumetone GI Disturbance     GI upset     Immunization History   Administered Date(s) Administered    COVID-19 Monovalent 18+ (Moderna) 04/29/2021, 06/01/2021    Influenza Vaccine >6 months,quad, PF 12/02/2014, 01/02/2017    Mantoux Tuberculin Skin Test 10/19/2015    Pneumococcal 23 valent 09/26/2007    TD,PF 7+ (Tenivac) 05/23/2005    TDAP Vaccine (Boostrix) 02/20/2014       Past Medical History:   Diagnosis Date    Allergic rhinitis 09/27/2011         Disorder of kidney and ureter 08/08/2008    Kidney disease GFR 87    Dorsalgia     No Comments Provided    Generalized anxiety  disorder     No Comments Provided    Hidradenitis suppurativa     No Comments Provided    Other disorders of lung (CODE) 08/01/2007    Pulmonary nodules, stable on follow-up chest CT 12/08.  No further imaging recommended.    Other specified disorders of Eustachian tube, unspecified ear 02/27/2012         Personal history of other medical treatment (CODE)     Childbirth x4    Rheumatoid arthritis (H) 02/27/2012         Tobacco use     No Comments Provided       Past Surgical History:   Procedure Laterality Date    ARTHROSCOPY KNEE  05/2017    Dr Torres    ARTHROSCOPY KNEE  07/20/2017    Dr Garza, lateral release, meniscal repair    ARTHROTOMY WRIST Left 2011    Dr. Torres    CHOLECYSTECTOMY  06/2007    Emergency tracheotomy following failed intubation for laparoscopic cholecystectomy.    DILATION AND CURETTAGE  09/2006         HERNIA REPAIR  2007    Incisoinal hernia repair    HYSTERECTOMY TOTAL ABDOMINAL  02/2010         IMPLANT STIMULATOR AND LEADS SACRAL NERVE (STAGE ONE AND TWO) N/A 12/11/2018    Procedure: Interstim Battery Replacement;  Surgeon: Rl Ambriz MD;  Location:  OR    INTERSTIM DEVICE STAGE 2  01/06/2014    Dr. Ambriz [facility]    LAPAROSCOPIC ABLATION ENDOMETRIOSIS  09/2006         OOPHORECTOMY Left 2010     Dr Thorpe    RECONSTRUCT FOREFOOT WITH METATARSOPHALANGEAL (MTP) FUSION Right 5/5/2022    Procedure: Right fibular sesmoid excision and 1st metatarsal phalangeal joint fusion;  Surgeon: Rl Nathan DPM;  Location: [facility] OR    RELEASE CARPAL TUNNEL  02/02/2017         RELEASE PLANTAR FASCIA Left 12/19/2024    Procedure: excision of soft tissue mass left foot,  gastroc recession;  Surgeon: Rl Nathan DPM;  Location:  OR    REMOVE HARDWARE FOOT Right 8/10/2023    Procedure: REMOVAL, HARDWARE, FOOT;  Surgeon: Rl Nathan DPM;  Location: [facility] OR    SALPINGO OOPHORECTOMY,R/L/KELSEY Right 06/1999    Right salpingo-oophorectomy for hemorrhagic corpus luteum cyst    TRACHEOSTOMY  2007     "emergency following failed intubation during cholecystectomy    TYMPANOSTOMY, LOCAL/TOPICAL ANESTHESIA  08/2006    PE tube placement       SOCIAL HISTORY  History   Smoking Status    Every Day    Types: Cigarettes   Smokeless Tobacco    Never    Social History    Substance and Sexual Activity      Alcohol use: No        Alcohol/week: 0.0 standard drinks of alcohol     History   Drug Use No       FAMILY HISTORY  Family History   Problem Relation Age of Onset    Arthritis Mother         Arthritis,Rheumatoid    Cancer Mother         Cancer, lung and uterine cancer    Heart Disease Father         Heart Disease,CAD, CABG, peripheral vascular dise    Thyroid Disease Father         Thyroid Disease, hyperlipidemia    Other - See Comments Father         djd    Asthma Brother         Asthma    Asthma Sister         Asthma    Other - See Comments Sister         Psychiatric illness,Anxiety    Other - See Comments Son         x2 back surgeries    Breast Cancer No family hx of         Cancer-breast       PHYSICAL EXAMINATION  Temp 97.3  F (36.3  C) (Tympanic)   Resp 16   Ht 1.626 m (5' 4\")   Wt 79.7 kg (175 lb 9.6 oz)   LMP 01/01/2007 (Approximate)   SpO2 96%   BMI 30.14 kg/m    Wt Readings from Last 2 Encounters:   02/10/25 79.7 kg (175 lb 9.6 oz)   02/06/25 77.7 kg (171 lb 6.4 oz)     Physical Exam  Cardiovascular:      Rate and Rhythm: Normal rate.      Heart sounds: Murmur heard.   Pulmonary:      Effort: Pulmonary effort is normal.   Lymphadenopathy:      Cervical:      Right cervical: No superficial or deep cervical adenopathy.     Left cervical: No superficial or deep cervical adenopathy.      Upper Body:      Right upper body: No supraclavicular or axillary adenopathy.      Left upper body: No supraclavicular or axillary adenopathy.   Neurological:      General: No focal deficit present.      Mental Status: She is alert and oriented to person, place, and time.   Psychiatric:         Mood and Affect: Mood normal.    "      Behavior: Behavior normal.     Laboratory and Imaging:     Latest Reference Range & Units 02/06/25 13:13   WBC 4.0 - 11.0 10e3/uL 2.0 (L)   Hemoglobin 11.7 - 15.7 g/dL 11.1 (L)   Hematocrit 35.0 - 47.0 % 31.5 (L)   Platelet Count 150 - 450 10e3/uL 169   (L): Data is abnormally low    ASSESSMENT AND PLAN    Macrocytic anemia   Neutropenia    New severe neutropenia and macrocytosis with anemia seen on labs initially seen 12/2024. Her CBCD done today in clinic shows a WBC count of 1.6 with hemoglobin of 91 with  with platelet count of 154.     History of rheumatoid arthritis. Not on medication. Anemia appears to be worsening quite quickly. Discussed she will need a bone marrow biopsy for evaluation. Heart and lung evaluated in anticipation of bone marrow. Does have a long standing murmur.     Will also check peripheral smear, ferritin, methylmalonic acid, folate, spep, gustavo, kappa lambda light chain, hepatitis panel, HIV, RF, JI.     Follow up after bone marrow biopsy.     Total time spent on the patient on day of encounter was 45 minutes doing chart review, review of test results, interpretation of results, patient visit and documentation.       Samina Harris MD

## 2025-02-10 NOTE — NURSING NOTE
"Oncology Rooming Note    February 10, 2025 1:36 PM   Alejandra Villegas is a 59 year old female who presents for:    Chief Complaint   Patient presents with    Hematology     Macrocytic Anemia     Initial Vitals: Temp 97.3  F (36.3  C) (Tympanic)   Resp 16   Ht 1.626 m (5' 4\")   Wt 79.7 kg (175 lb 9.6 oz)   LMP 01/01/2007 (Approximate)   SpO2 96%   BMI 30.14 kg/m   Estimated body mass index is 30.14 kg/m  as calculated from the following:    Height as of this encounter: 1.626 m (5' 4\").    Weight as of this encounter: 79.7 kg (175 lb 9.6 oz). Body surface area is 1.9 meters squared.  Mild Pain (1) Comment: Data Unavailable   Patient's last menstrual period was 01/01/2007 (approximate).  Allergies reviewed: Yes  Medications reviewed: Yes    Medications: Medication refills not needed today.  Pharmacy name entered into [employer]: [id number] - [place] [id number]    Frailty Screening:   Is the patient here for a new oncology consult visit in cancer care? 2. No      Clinical concerns: just want to know about the lab results and what is causing the low numbers.      Nilda Chavis CMA ([facility]) 2/10/2025 1:36 PM  "

## 2025-02-10 NOTE — TELEPHONE ENCOUNTER
DATE/TIME OF CALL RECEIVED FROM LAB:  02/10/25 at 2:37 PM   LAB TEST:  ANC  LAB VALUE:  0.2  PROVIDER NOTIFIED?: Yes  PROVIDER NAME: Dr. Harris   DATE/TIME LAB VALUE REPORTED TO PROVIDER: 2:37 PM   MECHANISM OF PROVIDER NOTIFICATION:  phone note  PROVIDER RESPONSE: pending

## 2025-02-11 LAB
HAPTOGLOB SERPL-MCNC: 219 MG/DL (ref 30–200)
HBV CORE AB SERPL QL IA: NONREACTIVE
HBV CORE IGM SERPL QL IA: NONREACTIVE
HBV SURFACE AB SERPL IA-ACNC: <3.5 M[IU]/ML
HBV SURFACE AB SERPL IA-ACNC: NONREACTIVE M[IU]/ML
HBV SURFACE AG SERPL QL IA: NONREACTIVE
HCV AB SERPL QL IA: NONREACTIVE
HIV 1+2 AB+HIV1 P24 AG SERPL QL IA: NONREACTIVE

## 2025-02-12 ENCOUNTER — ANESTHESIA EVENT (OUTPATIENT)
Dept: SURGERY | Facility: OTHER | Age: 60
End: 2025-02-12
Payer: MEDICARE

## 2025-02-12 LAB
ALBUMIN SERPL ELPH-MCNC: 3.9 G/DL (ref 3.7–5.1)
ALPHA1 GLOB SERPL ELPH-MCNC: 0.4 G/DL (ref 0.2–0.4)
ALPHA2 GLOB SERPL ELPH-MCNC: 0.9 G/DL (ref 0.5–0.9)
ANA SER QL IF: NEGATIVE
B-GLOBULIN SERPL ELPH-MCNC: 0.9 G/DL (ref 0.6–1)
GAMMA GLOB SERPL ELPH-MCNC: 1 G/DL (ref 0.7–1.6)
KAPPA LC FREE SER-MCNC: 4.56 MG/DL (ref 0.33–1.94)
KAPPA LC FREE/LAMBDA FREE SER NEPH: 1.5 {RATIO} (ref 0.26–1.65)
LAMBDA LC FREE SERPL-MCNC: 3.03 MG/DL (ref 0.57–2.63)
LOCATION OF TASK: NORMAL
LOCATION OF TASK: NORMAL
M PROTEIN SERPL ELPH-MCNC: 0 G/DL
METHYLMALONATE SERPL-SCNC: 0.21 UMOL/L (ref 0–0.4)
PROT PATTERN SERPL ELPH-IMP: NORMAL
PROT PATTERN SERPL IFE-IMP: NORMAL
RHEUMATOID FACT SERPL-ACNC: >650 IU/ML

## 2025-02-12 RX ORDER — ATORVASTATIN CALCIUM 40 MG/1
40 TABLET, FILM COATED ORAL AT BEDTIME
Qty: 90 TABLET | Refills: 0 | Status: SHIPPED | OUTPATIENT
Start: 2025-02-12

## 2025-02-12 RX ORDER — PAROXETINE 20 MG/1
20 TABLET, FILM COATED ORAL EVERY MORNING
Qty: 90 TABLET | Refills: 0 | Status: SHIPPED | OUTPATIENT
Start: 2025-02-12

## 2025-02-12 RX ORDER — LISINOPRIL 10 MG/1
10 TABLET ORAL DAILY
Qty: 90 TABLET | Refills: 0 | Status: SHIPPED | OUTPATIENT
Start: 2025-02-12

## 2025-02-12 NOTE — TELEPHONE ENCOUNTER
[id number] sent Rx request for the following:      Requested Prescriptions   Pending Prescriptions Disp Refills    PARoxetine (PAXIL) 20 MG tablet [Pharmacy Med Name: PAROXETINE 20MG TABLET] 90 tablet 4     Sig: TAKE 1 TABLET (20 MG) BY MOUTH EVERY MORNING   Last Prescription Date:   1/23/24  Last Fill Qty/Refills:         90, R-4                     atorvastatin (LIPITOR) 40 MG tablet [Pharmacy Med Name: ATORVASTATIN 40MG TABLET] 90 tablet 4     Sig: TAKE 1 TABLET (40 MG) BY MOUTH AT BEDTIME   Last Prescription Date:   1/23/24  Last Fill Qty/Refills:         90, R-4                     lisinopril (ZESTRIL) 10 MG tablet [Pharmacy Med Name: LISINOPRIL 10MG TABLET] 90 tablet 4     Sig: TAKE 1 TABLET (10 MG) BY MOUTH DAILY   Last Prescription Date:   1/23/24  Last Fill Qty/Refills:         90, R-4                   Last Office Visit:              1/14/25   Future Office visit:           None    Per LOV note:  Return in about 4 weeks (around 2/11/2025) for Routine preventive + .     Pt due for annual exam. Routing to provider for refill consideration. Routing to Unit scheduling pool, to assist Pt in scheduling appointment. Unable to complete prescription refill per RN Medication Refill Policy. Routing to covering provider for refill consideration, as PCP/provider is out of clinic >48 hours or Pt is completely out of medication and provider is out of the clinic today. Consuelo Riley RN .............. 2/12/2025  4:16 PM

## 2025-02-13 ENCOUNTER — TELEPHONE (OUTPATIENT)
Dept: ONCOLOGY | Facility: OTHER | Age: 60
End: 2025-02-13

## 2025-02-13 ENCOUNTER — HOSPITAL ENCOUNTER (OUTPATIENT)
Facility: OTHER | Age: 60
Discharge: HOME | End: 2025-02-13
Attending: PATHOLOGY | Admitting: PATHOLOGY
Payer: MEDICARE

## 2025-02-13 ENCOUNTER — ANESTHESIA (OUTPATIENT)
Dept: SURGERY | Facility: OTHER | Age: 60
End: 2025-02-13
Payer: MEDICARE

## 2025-02-13 VITALS
DIASTOLIC BLOOD PRESSURE: 65 MMHG | OXYGEN SATURATION: 96 % | SYSTOLIC BLOOD PRESSURE: 103 MMHG | BODY MASS INDEX: 29.88 KG/M2 | RESPIRATION RATE: 16 BRPM | HEIGHT: 64 IN | WEIGHT: 175 LBS | TEMPERATURE: 97.1 F | HEART RATE: 71 BPM

## 2025-02-13 DIAGNOSIS — D53.9 MACROCYTIC ANEMIA: Primary | ICD-10-CM

## 2025-02-13 LAB
BASOPHILS # BLD AUTO: 0 10E3/UL (ref 0–0.2)
BASOPHILS NFR BLD AUTO: 0 %
EOSINOPHIL # BLD AUTO: 0 10E3/UL (ref 0–0.7)
EOSINOPHIL NFR BLD AUTO: 1 %
ERYTHROCYTE [DISTWIDTH] IN BLOOD BY AUTOMATED COUNT: 13.5 % (ref 10–15)
GLUCOSE BLDC GLUCOMTR-MCNC: 98 MG/DL (ref 70–99)
HCT VFR BLD AUTO: 22.8 % (ref 35–47)
HGB BLD-MCNC: 7.8 G/DL (ref 11.7–15.7)
IMM GRANULOCYTES # BLD: 0 10E3/UL
IMM GRANULOCYTES NFR BLD: 0 %
LYMPHOCYTES # BLD AUTO: 1.4 10E3/UL (ref 0.8–5.3)
LYMPHOCYTES NFR BLD AUTO: 71 %
MCH RBC QN AUTO: 40 PG (ref 26.5–33)
MCHC RBC AUTO-ENTMCNC: 34.2 G/DL (ref 31.5–36.5)
MCV RBC AUTO: 117 FL (ref 78–100)
MONOCYTES # BLD AUTO: 0.3 10E3/UL (ref 0–1.3)
MONOCYTES NFR BLD AUTO: 14 %
NEUTROPHILS # BLD AUTO: 0.3 10E3/UL (ref 1.6–8.3)
NEUTROPHILS NFR BLD AUTO: 15 %
NRBC # BLD AUTO: 0 10E3/UL
NRBC BLD AUTO-RTO: 2 /100
PATH REPORT.FINAL DX SPEC: NORMAL
PLATELET # BLD AUTO: 162 10E3/UL (ref 150–450)
RBC # BLD AUTO: 1.95 10E6/UL (ref 3.8–5.2)
WBC # BLD AUTO: 2 10E3/UL (ref 4–11)

## 2025-02-13 PROCEDURE — 88237 TISSUE CULTURE BONE MARROW: CPT

## 2025-02-13 PROCEDURE — 88184 FLOWCYTOMETRY/ TC 1 MARKER: CPT

## 2025-02-13 PROCEDURE — 88264 CHROMOSOME ANALYSIS 20-25: CPT

## 2025-02-13 PROCEDURE — 88360 TUMOR IMMUNOHISTOCHEM/MANUAL: CPT

## 2025-02-13 PROCEDURE — 88313 SPECIAL STAINS GROUP 2: CPT

## 2025-02-13 PROCEDURE — 360N000075 HC SURGERY LEVEL 2, PER MIN: Performed by: PATHOLOGY

## 2025-02-13 PROCEDURE — 88342 IMHCHEM/IMCYTCHM 1ST ANTB: CPT

## 2025-02-13 PROCEDURE — 370N000017 HC ANESTHESIA TECHNICAL FEE, PER MIN: Performed by: PATHOLOGY

## 2025-02-13 PROCEDURE — 85004 AUTOMATED DIFF WBC COUNT: CPT | Performed by: NURSE PRACTITIONER

## 2025-02-13 PROCEDURE — 85025 COMPLETE CBC W/AUTO DIFF WBC: CPT

## 2025-02-13 PROCEDURE — 36415 COLL VENOUS BLD VENIPUNCTURE: CPT | Performed by: NURSE PRACTITIONER

## 2025-02-13 PROCEDURE — 88275 CYTOGENETICS 100-300: CPT

## 2025-02-13 PROCEDURE — 85049 AUTOMATED PLATELET COUNT: CPT | Performed by: NURSE PRACTITIONER

## 2025-02-13 PROCEDURE — 81450 HL NEO GSAP 5-50DNA/DNA&RNA: CPT

## 2025-02-13 PROCEDURE — 88271 CYTOGENETICS DNA PROBE: CPT

## 2025-02-13 PROCEDURE — 250N000009 HC RX 250: Performed by: NURSE ANESTHETIST, CERTIFIED REGISTERED

## 2025-02-13 PROCEDURE — 38222 DX BONE MARROW BX & ASPIR: CPT | Performed by: NURSE ANESTHETIST, CERTIFIED REGISTERED

## 2025-02-13 PROCEDURE — 85018 HEMOGLOBIN: CPT | Performed by: NURSE PRACTITIONER

## 2025-02-13 PROCEDURE — 258N000003 HC RX IP 258 OP 636

## 2025-02-13 PROCEDURE — 88185 FLOWCYTOMETRY/TC ADD-ON: CPT

## 2025-02-13 PROCEDURE — 88341 IMHCHEM/IMCYTCHM EA ADD ANTB: CPT

## 2025-02-13 PROCEDURE — 250N000011 HC RX IP 250 OP 636: Performed by: NURSE ANESTHETIST, CERTIFIED REGISTERED

## 2025-02-13 PROCEDURE — 85045 AUTOMATED RETICULOCYTE COUNT: CPT

## 2025-02-13 PROCEDURE — 88311 DECALCIFY TISSUE: CPT

## 2025-02-13 PROCEDURE — 999N000141 HC STATISTIC PRE-PROCEDURE NURSING ASSESSMENT: Performed by: PATHOLOGY

## 2025-02-13 PROCEDURE — 710N000012 HC RECOVERY PHASE 2, PER MINUTE: Performed by: PATHOLOGY

## 2025-02-13 PROCEDURE — 82962 GLUCOSE BLOOD TEST: CPT

## 2025-02-13 PROCEDURE — 88305 TISSUE EXAM BY PATHOLOGIST: CPT

## 2025-02-13 RX ORDER — ONDANSETRON 4 MG/1
4 TABLET, ORALLY DISINTEGRATING ORAL EVERY 30 MIN PRN
Status: DISCONTINUED | OUTPATIENT
Start: 2025-02-13 | End: 2025-02-13 | Stop reason: HOSPADM

## 2025-02-13 RX ORDER — HYDROMORPHONE HCL IN WATER/PF 6 MG/30 ML
0.2 PATIENT CONTROLLED ANALGESIA SYRINGE INTRAVENOUS EVERY 5 MIN PRN
Status: DISCONTINUED | OUTPATIENT
Start: 2025-02-13 | End: 2025-02-13 | Stop reason: HOSPADM

## 2025-02-13 RX ORDER — FENTANYL CITRATE 50 UG/ML
25 INJECTION, SOLUTION INTRAMUSCULAR; INTRAVENOUS EVERY 5 MIN PRN
Status: DISCONTINUED | OUTPATIENT
Start: 2025-02-13 | End: 2025-02-13 | Stop reason: HOSPADM

## 2025-02-13 RX ORDER — SODIUM CHLORIDE, SODIUM LACTATE, POTASSIUM CHLORIDE, CALCIUM CHLORIDE 600; 310; 30; 20 MG/100ML; MG/100ML; MG/100ML; MG/100ML
INJECTION, SOLUTION INTRAVENOUS CONTINUOUS
Status: DISCONTINUED | OUTPATIENT
Start: 2025-02-13 | End: 2025-02-13 | Stop reason: HOSPADM

## 2025-02-13 RX ORDER — ONDANSETRON 2 MG/ML
4 INJECTION INTRAMUSCULAR; INTRAVENOUS EVERY 30 MIN PRN
Status: DISCONTINUED | OUTPATIENT
Start: 2025-02-13 | End: 2025-02-13 | Stop reason: HOSPADM

## 2025-02-13 RX ORDER — OXYCODONE HYDROCHLORIDE 5 MG/1
5 TABLET ORAL
Status: DISCONTINUED | OUTPATIENT
Start: 2025-02-13 | End: 2025-02-13 | Stop reason: HOSPADM

## 2025-02-13 RX ORDER — NALOXONE HYDROCHLORIDE 0.4 MG/ML
0.1 INJECTION, SOLUTION INTRAMUSCULAR; INTRAVENOUS; SUBCUTANEOUS
Status: DISCONTINUED | OUTPATIENT
Start: 2025-02-13 | End: 2025-02-13 | Stop reason: HOSPADM

## 2025-02-13 RX ORDER — HYDROMORPHONE HCL IN WATER/PF 6 MG/30 ML
0.4 PATIENT CONTROLLED ANALGESIA SYRINGE INTRAVENOUS EVERY 5 MIN PRN
Status: DISCONTINUED | OUTPATIENT
Start: 2025-02-13 | End: 2025-02-13 | Stop reason: HOSPADM

## 2025-02-13 RX ORDER — DEXAMETHASONE SODIUM PHOSPHATE 10 MG/ML
4 INJECTION, SOLUTION INTRAMUSCULAR; INTRAVENOUS
Status: DISCONTINUED | OUTPATIENT
Start: 2025-02-13 | End: 2025-02-13 | Stop reason: HOSPADM

## 2025-02-13 RX ORDER — FENTANYL CITRATE 50 UG/ML
50 INJECTION, SOLUTION INTRAMUSCULAR; INTRAVENOUS EVERY 5 MIN PRN
Status: DISCONTINUED | OUTPATIENT
Start: 2025-02-13 | End: 2025-02-13 | Stop reason: HOSPADM

## 2025-02-13 RX ORDER — LIDOCAINE 40 MG/G
CREAM TOPICAL
Status: DISCONTINUED | OUTPATIENT
Start: 2025-02-13 | End: 2025-02-13 | Stop reason: HOSPADM

## 2025-02-13 RX ORDER — EPINEPHRINE 1 MG/ML
0.3 INJECTION, SOLUTION, CONCENTRATE INTRAVENOUS EVERY 5 MIN PRN
OUTPATIENT
Start: 2025-02-14

## 2025-02-13 RX ORDER — PROPOFOL 10 MG/ML
INJECTION, EMULSION INTRAVENOUS PRN
Status: DISCONTINUED | OUTPATIENT
Start: 2025-02-13 | End: 2025-02-13

## 2025-02-13 RX ORDER — DIPHENHYDRAMINE HYDROCHLORIDE 50 MG/ML
50 INJECTION INTRAMUSCULAR; INTRAVENOUS
Start: 2025-02-14

## 2025-02-13 RX ORDER — LIDOCAINE HYDROCHLORIDE 10 MG/ML
8-10 INJECTION, SOLUTION EPIDURAL; INFILTRATION; INTRACAUDAL; PERINEURAL
Status: DISCONTINUED | OUTPATIENT
Start: 2025-02-13 | End: 2025-02-13 | Stop reason: HOSPADM

## 2025-02-13 RX ORDER — OXYCODONE HYDROCHLORIDE 5 MG/1
10 TABLET ORAL
Status: DISCONTINUED | OUTPATIENT
Start: 2025-02-13 | End: 2025-02-13 | Stop reason: HOSPADM

## 2025-02-13 RX ORDER — LIDOCAINE HYDROCHLORIDE 20 MG/ML
INJECTION, SOLUTION INFILTRATION; PERINEURAL PRN
Status: DISCONTINUED | OUTPATIENT
Start: 2025-02-13 | End: 2025-02-13

## 2025-02-13 RX ADMIN — PROPOFOL 125 MCG/KG/MIN: 10 INJECTION, EMULSION INTRAVENOUS at 09:48

## 2025-02-13 RX ADMIN — LIDOCAINE HYDROCHLORIDE 40 MG: 20 INJECTION, SOLUTION INFILTRATION; PERINEURAL at 09:48

## 2025-02-13 RX ADMIN — SODIUM CHLORIDE, POTASSIUM CHLORIDE, SODIUM LACTATE AND CALCIUM CHLORIDE 100 ML/HR: 600; 310; 30; 20 INJECTION, SOLUTION INTRAVENOUS at 09:22

## 2025-02-13 ASSESSMENT — LIFESTYLE VARIABLES: TOBACCO_USE: 1

## 2025-02-13 ASSESSMENT — ACTIVITIES OF DAILY LIVING (ADL)
ADLS_ACUITY_SCORE: 35

## 2025-02-13 ASSESSMENT — COPD QUESTIONNAIRES: COPD: 1

## 2025-02-13 NOTE — ANESTHESIA POSTPROCEDURE EVALUATION
Patient: Alejandra Villegas    Procedure: Procedure(s):  Bone marrow biopsy, bone specimen, needle/trocar       Anesthesia Type:  MAC    Note:  Disposition: Outpatient   Postop Pain Control: Uneventful            Sign Out: Well controlled pain   PONV: No   Neuro/Psych: Uneventful            Sign Out: Acceptable/Baseline neuro status   Airway/Respiratory: Uneventful            Sign Out: Acceptable/Baseline resp. status   CV/Hemodynamics: Uneventful            Sign Out: Acceptable CV status; No obvious hypovolemia; No obvious fluid overload   Other NRE: NONE   DID A NON-ROUTINE EVENT OCCUR?            Last vitals:  Vitals Value Taken Time   BP 87/58 02/13/25 1017   Temp 97.1  F (36.2  C) 02/13/25 1016   Pulse 69 02/13/25 1017   Resp 16 02/13/25 1016   SpO2 92 % 02/13/25 1024   Vitals shown include unfiled device data.    Electronically Signed By: AMARILIS MCNEILL CRNA  February 13, 2025  10:25 AM

## 2025-02-13 NOTE — TELEPHONE ENCOUNTER
Spoke with Alejandra and she reports that she is feeling fatigued. Some days she feels better than others. She denies pain or any other new symptoms. She did have BMB today. Discussed dropping Hgb with her and options of blood transfusion or rechecking CBC on Monday 2/17/25. Patient is willing to do whatever is recommended, but is hesitant to go to ED.    Spoke with house supervisor due to infusion unable to administer blood. Patient will need to arrive at ED registration desk at 11 am tomorrow (2/14/25) for 1 unit blood.  Magalis Lopez APRN CNP  will call patient and do consent. Blood plan placed and type and screen ordered per house supervisor. Provider would still like CBC rechecked Monday (2/17/25).    Patient notified and in agreement.  Lisha Winkler RN...........2/13/2025 2:13 PM

## 2025-02-13 NOTE — ANESTHESIA PREPROCEDURE EVALUATION
Anesthesia Pre-Procedure Evaluation    Patient: Alejandra Villegas   MRN: [id number] : 1965        Procedure : Procedure(s):  Bone marrow biopsy          Past Medical History:   Diagnosis Date    Allergic rhinitis 2011         Disorder of kidney and ureter 2008    Kidney disease GFR 87    Dorsalgia     No Comments Provided    Generalized anxiety disorder     No Comments Provided    Hidradenitis suppurativa     No Comments Provided    Other disorders of lung (CODE) 2007    Pulmonary nodules, stable on follow-up chest CT .  No further imaging recommended.    Other specified disorders of Eustachian tube, unspecified ear 2012         Personal history of other medical treatment (CODE)     Childbirth x4    Rheumatoid arthritis (H) 2012         Tobacco use     No Comments Provided      Past Surgical History:   Procedure Laterality Date    ARTHROSCOPY KNEE  2017    Dr Torres    ARTHROSCOPY KNEE  2017    Dr Garza, lateral release, meniscal repair    ARTHROTOMY WRIST Left     Dr. Torres    CHOLECYSTECTOMY  2007    Emergency tracheotomy following failed intubation for laparoscopic cholecystectomy.    DILATION AND CURETTAGE  2006         HERNIA REPAIR  2007    Incisoinal hernia repair    HYSTERECTOMY TOTAL ABDOMINAL  2010         IMPLANT STIMULATOR AND LEADS SACRAL NERVE (STAGE ONE AND TWO) N/A 2018    Procedure: Interstim Battery Replacement;  Surgeon: Rl Ambriz MD;  Location:  OR    INTERSTIM DEVICE STAGE 2  2014    Dr. Ambriz [facility]    LAPAROSCOPIC ABLATION ENDOMETRIOSIS  2006         OOPHORECTOMY Left      Dr Thorpe    RECONSTRUCT FOREFOOT WITH METATARSOPHALANGEAL (MTP) FUSION Right 2022    Procedure: Right fibular sesmoid excision and 1st metatarsal phalangeal joint fusion;  Surgeon: Rl Nathan DPM;  Location:  OR    RELEASE CARPAL TUNNEL  2017         RELEASE PLANTAR FASCIA Left 2024    Procedure: excision  of soft tissue mass left foot,  gastroc recession;  Surgeon: Rl Nathan DPM;  Location: [facility] OR    REMOVE HARDWARE FOOT Right 8/10/2023    Procedure: REMOVAL, HARDWARE, FOOT;  Surgeon: Rl Nathan DPM;  Location: [facility] OR    SALPINGO OOPHORECTOMY,R/L/KELSEY Right 06/1999    Right salpingo-oophorectomy for hemorrhagic corpus luteum cyst    TRACHEOSTOMY  2007    emergency following failed intubation during cholecystectomy    TYMPANOSTOMY, LOCAL/TOPICAL ANESTHESIA  08/2006    PE tube placement      Allergies   Allergen Reactions    Adhesive Tape     Bee Pollen Swelling     Seasonal    Bee Venom Unknown    Folic Acid Itching    Meloxicam Unknown    Pollen Extract      Seasonal    Amoxicillin Rash    Liquid Adhesive Rash    Nabumetone GI Disturbance     GI upset      Social History     Tobacco Use    Smoking status: Every Day     Current packs/day: 1.00     Average packs/day: 1 pack/day for 44.1 years (44.1 ttl pk-yrs)     Types: Cigarettes     Start date: 1981     Passive exposure: Past    Smokeless tobacco: Never    Tobacco comments:     Passive exposure in childhood and adult homes and some work places   Substance Use Topics    Alcohol use: No     Alcohol/week: 0.0 standard drinks of alcohol      Wt Readings from Last 1 Encounters:   02/13/25 79.4 kg (175 lb)        Anesthesia Evaluation   Pt has had prior anesthetic.     History of anesthetic complications   Posssible history of difficult intubation resulitng in tracheostomy..    ROS/MED HX  ENT/Pulmonary:     (+)                tobacco use, Current use,    Mild Persistent, asthma  Treatment: Inhaler prn,   COPD,              Neurologic:  - neg neurologic ROS     Cardiovascular:     (+) Dyslipidemia hypertension- -   -  - -                                   (-) murmur   METS/Exercise Tolerance: >4 METS    Hematologic:  - neg hematologic  ROS     Musculoskeletal:   (+)  arthritis,             GI/Hepatic:     (+) GERD, Asymptomatic on medication,                 "  Renal/Genitourinary:     (+) renal disease, type: CRI,            Endo:     (+)  type II DM,                    Psychiatric/Substance Use:  - neg psychiatric ROS     Infectious Disease:  - neg infectious disease ROS     Malignancy:  - neg malignancy ROS     Other:  - neg other ROS          Physical Exam    Airway        Mallampati: IV   TM distance: > 3 FB   Neck ROM: full   Mouth opening: > 3 cm    Respiratory Devices and Support         Dental     Comment: Upper plated.  Multiple missing lower teeth.    (+) Removable bridges or other hardware      Cardiovascular   cardiovascular exam normal       Rhythm and rate: regular and normal (-) no murmur    Pulmonary           (+) wheezes           OUTSIDE LABS:  CBC:   Lab Results   Component Value Date    WBC 1.6 (L) 02/10/2025    WBC 2.0 (L) 02/06/2025    HGB 9.1 (L) 02/10/2025    HGB 11.1 (L) 02/06/2025    HCT 26.7 (L) 02/10/2025    HCT 31.5 (L) 02/06/2025     02/10/2025     02/06/2025     BMP:   Lab Results   Component Value Date     02/10/2025     02/06/2025    POTASSIUM 4.5 02/10/2025    POTASSIUM 4.4 02/06/2025    CHLORIDE 101 02/10/2025    CHLORIDE 99 02/06/2025    CO2 25 02/10/2025    CO2 24 02/06/2025    BUN 10.5 02/10/2025    BUN 11.9 02/06/2025    CR 0.72 02/10/2025    CR 0.73 02/06/2025     (H) 02/10/2025     (H) 02/06/2025     COAGS: No results found for: \"PTT\", \"INR\", \"FIBR\"  POC: No results found for: \"BGM\", \"HCG\", \"HCGS\"  HEPATIC:   Lab Results   Component Value Date    ALBUMIN 4.1 02/10/2025    PROTTOTAL 7.1 02/10/2025    ALT 15 02/10/2025    AST 21 02/10/2025    ALKPHOS 179 (H) 02/10/2025    BILITOTAL 0.7 02/10/2025     OTHER:   Lab Results   Component Value Date    LACT 1.4 02/06/2025    A1C 6.0 (H) 12/09/2024    TOBIN 8.9 02/10/2025    MAG 2.4 (H) 01/14/2025       Anesthesia Plan    ASA Status:  3    NPO Status:  NPO Appropriate    Anesthesia Type: MAC.     - Reason for MAC: straight local not clinically " "adequate   Induction: Intravenous.           Consents    Anesthesia Plan(s) and associated risks, benefits, and realistic alternatives discussed. Questions answered and patient/representative(s) expressed understanding.     - Discussed: Risks, Benefits and Alternatives for BOTH SEDATION and the PROCEDURE were discussed     - Discussed with:  Patient            Postoperative Care       PONV prophylaxis: Ondansetron (or other 5HT-3)     Comments:               AMARILIS MCNEILL CRNA    I have reviewed the pertinent notes and labs in the chart from the past 30 days and (re)examined the patient.  Any updates or changes from those notes are reflected in this note.             # Drug Induced Platelet Defect: home medication list includes an antiplatelet medication   # Hypertension: Noted on problem list           # Obesity: Estimated body mass index is 30.04 kg/m  as calculated from the following:    Height as of this encounter: 1.626 m (5' 4\").    Weight as of this encounter: 79.4 kg (175 lb).       # Asthma: noted on problem list       "

## 2025-02-13 NOTE — PROCEDURES
Bone Marrow Biopsy Procedure Note    Indication: Macrocytic anemia    Following discussion of the risks and benefits of the procedure, signing of the informed consent, and Pause for the Cause, right bone marrow aspirate and biopsy were performed at the right iliac crest under conscious sedation. 1% lidocaine, 5 ml, was infiltrated at the site for local anesthesia and pain control. Good samples were obtained. The procedure was well tolerated and the patient was discharged home after an interval of observation.      Alejandra will follow up with Dr. Harris as scheduled or sooner if needed for pain, bleeding or signs/symptoms of infection at the site.    Thank you for the opportunity to participate in Alejandra's care.    Hema Mari M.D.

## 2025-02-13 NOTE — OR NURSING
Lab staff present during procedure to collect, document and transport specimens to lab.      5mL Lidocaine 1% from bone marrow kit injected by Dr. Mari  at 10:00 AM.

## 2025-02-13 NOTE — DISCHARGE INSTRUCTIONS
[facility]  Adult Discharge Orders & Instructions      For 24 hours after surgery:  Get plenty of rest.  A responsible adult must stay with you for at least 24 hours after you leave the hospital.   You may feel lightheaded.  IF so, sit for a few minutes before standing.  Have someone help you get up.   You may have a slight fever. Call the doctor if your fever is over 101 F (38.3 C) (taken under the tongue) or lasts longer than 24 hours.  You may have a dry mouth, a sore throat, muscle aches or trouble sleeping.  These should go away after 24 hours.  Do not make important or legal decisions.  6.   Do not drive or use heavy equipment.  If you have weakness or tingling, don't drive or use heavy equipment until this feeling goes away.                                                                                                                                                                         To contact a doctor, call    [phone]______________

## 2025-02-13 NOTE — ANESTHESIA CARE TRANSFER NOTE
Patient: Alejandra Villegas    Procedure: Procedure(s):  Bone marrow biopsy, bone specimen, needle/trocar       Diagnosis: Macrocytic anemia [D53.9]  Leukopenia [D72.819]  Diagnosis Additional Information: No value filed.    Anesthesia Type:   MAC     Note:    Oropharynx: oropharynx clear of all foreign objects  Level of Consciousness: awake  Oxygen Supplementation: room air    Independent Airway: airway patency satisfactory and stable  Dentition: dentition unchanged  Vital Signs Stable: post-procedure vital signs reviewed and stable  Report to RN Given: handoff report given  Patient transferred to: Phase II    Handoff Report: Identifed the Patient, Identified the Reponsible Provider, Reviewed the pertinent medical history, Discussed the surgical course, Reviewed Intra-OP anesthesia mangement and issues during anesthesia, Set expectations for post-procedure period and Allowed opportunity for questions and acknowledgement of understanding      Vitals:  Vitals Value Taken Time   BP     Temp     Pulse     Resp     SpO2 94 % 02/13/25 1015   Vitals shown include unfiled device data.    Electronically Signed By: AMARILIS SOMERS CRNA  February 13, 2025  10:16 AM

## 2025-02-13 NOTE — TELEPHONE ENCOUNTER
Call to Alejandra to obtain consent for blood transfusion.     Alejandra was seen by Dr. Harris 2/10. At that time, labs and bone marrow were ordered. Bone marrow was completed today. Her hemoglobin continues to drop. In the last wee:    2/6/2025: 11.1  2/10/2025 9.1  2/13/2025 7.8    Given her nearly 3.5 gram drop in 1 week, and going into the weekend, and with her being symptomatolytic, I am electing to treat her with a single unit pRBC tomorrow.     Today, we reviewed the consent form. We discussed why and how we are giving blood, the risks associated with both getting and not getting blood, alternative options, and possible side effects. Patient had opportunity to ask questions. She denies any.     This was a telephone consent. We will bring consent to ED tomorrow to have her sign and provide her a copy.     I will schedule a hemoglobin recheck on Monday.     AMARILIS Muñoz CNP

## 2025-02-13 NOTE — OR NURSING
Patient has been discharged to home at 1110 via ambulatory accompanied by Nayely CORDERO    Written discharge instructions were provided to patient.  Prescriptions were none.  Patient states their pain is none, and denies any nausea or dizziness upon discharge.    Patient and adult caring for them verbalize understanding of discharge instructions including no driving until tomorrow and no longer taking narcotic pain medications - no operating mechanical equipment and no making any important decisions.They understand reason for discharge, and necessary follow-up appointments.

## 2025-02-13 NOTE — PROGRESS NOTES
Pathology Interim Summary Note    I have reviewed the patient's prior H&P/clinic note. There have been no changes in the interim.    Hema Mari  Staff Pathologist  [phone]

## 2025-02-14 ENCOUNTER — HOSPITAL ENCOUNTER (OUTPATIENT)
Facility: OTHER | Age: 60
Discharge: HOME | End: 2025-02-14
Attending: FAMILY MEDICINE | Admitting: FAMILY MEDICINE
Payer: MEDICARE

## 2025-02-14 VITALS
SYSTOLIC BLOOD PRESSURE: 112 MMHG | TEMPERATURE: 98.4 F | HEART RATE: 69 BPM | RESPIRATION RATE: 16 BRPM | OXYGEN SATURATION: 94 % | DIASTOLIC BLOOD PRESSURE: 75 MMHG

## 2025-02-14 DIAGNOSIS — D53.9 MACROCYTIC ANEMIA: Primary | ICD-10-CM

## 2025-02-14 LAB
ABO + RH BLD: NORMAL
BLD GP AB SCN SERPL QL: NEGATIVE
BLD PROD TYP BPU: NORMAL
BLOOD COMPONENT TYPE: NORMAL
CODING SYSTEM: NORMAL
CROSSMATCH: NORMAL
ISSUE DATE AND TIME: NORMAL
SPECIMEN EXP DATE BLD: NORMAL
UNIT ABO/RH: NORMAL
UNIT NUMBER: NORMAL
UNIT STATUS: NORMAL
UNIT TYPE ISBT: 6200

## 2025-02-14 PROCEDURE — 258N000003 HC RX IP 258 OP 636: Performed by: NURSE PRACTITIONER

## 2025-02-14 PROCEDURE — 86900 BLOOD TYPING SEROLOGIC ABO: CPT | Performed by: NURSE PRACTITIONER

## 2025-02-14 PROCEDURE — P9016 RBC LEUKOCYTES REDUCED: HCPCS | Performed by: NURSE PRACTITIONER

## 2025-02-14 PROCEDURE — 36430 TRANSFUSION BLD/BLD COMPNT: CPT

## 2025-02-14 PROCEDURE — 86850 RBC ANTIBODY SCREEN: CPT | Performed by: NURSE PRACTITIONER

## 2025-02-14 PROCEDURE — 86923 COMPATIBILITY TEST ELECTRIC: CPT | Performed by: NURSE PRACTITIONER

## 2025-02-14 PROCEDURE — 36415 COLL VENOUS BLD VENIPUNCTURE: CPT | Performed by: NURSE PRACTITIONER

## 2025-02-14 RX ORDER — DIPHENHYDRAMINE HYDROCHLORIDE 50 MG/ML
50 INJECTION INTRAMUSCULAR; INTRAVENOUS
Start: 2025-02-14

## 2025-02-14 RX ORDER — EPINEPHRINE 1 MG/ML
0.3 INJECTION, SOLUTION, CONCENTRATE INTRAVENOUS EVERY 5 MIN PRN
OUTPATIENT
Start: 2025-02-14

## 2025-02-14 RX ADMIN — SODIUM CHLORIDE 250 ML: 0.9 INJECTION, SOLUTION INTRAVENOUS at 15:24

## 2025-02-14 ASSESSMENT — ACTIVITIES OF DAILY LIVING (ADL)
ADLS_ACUITY_SCORE: 41

## 2025-02-16 LAB
ABO + RH BLD: NORMAL
BLD GP AB SCN SERPL QL: NEGATIVE
SPECIMEN EXP DATE BLD: NORMAL

## 2025-02-17 ENCOUNTER — TELEPHONE (OUTPATIENT)
Dept: ONCOLOGY | Facility: OTHER | Age: 60
End: 2025-02-17
Payer: MEDICARE

## 2025-02-17 ENCOUNTER — LAB (OUTPATIENT)
Dept: LAB | Facility: OTHER | Age: 60
End: 2025-02-17
Payer: MEDICARE

## 2025-02-17 ENCOUNTER — PATIENT OUTREACH (OUTPATIENT)
Dept: FAMILY MEDICINE | Facility: OTHER | Age: 60
End: 2025-02-17

## 2025-02-17 ENCOUNTER — TELEPHONE (OUTPATIENT)
Dept: ONCOLOGY | Facility: OTHER | Age: 60
End: 2025-02-17

## 2025-02-17 DIAGNOSIS — D53.9 MACROCYTIC ANEMIA: ICD-10-CM

## 2025-02-17 LAB
BASOPHILS # BLD AUTO: 0 10E3/UL (ref 0–0.2)
BASOPHILS NFR BLD AUTO: 1 %
EOSINOPHIL # BLD AUTO: 0 10E3/UL (ref 0–0.7)
EOSINOPHIL NFR BLD AUTO: 2 %
ERYTHROCYTE [DISTWIDTH] IN BLOOD BY AUTOMATED COUNT: 18 % (ref 10–15)
HCT VFR BLD AUTO: 31.8 % (ref 35–47)
HGB BLD-MCNC: 11 G/DL (ref 11.7–15.7)
IMM GRANULOCYTES # BLD: 0 10E3/UL
IMM GRANULOCYTES NFR BLD: 0 %
LYMPHOCYTES # BLD AUTO: 1.3 10E3/UL (ref 0.8–5.3)
LYMPHOCYTES NFR BLD AUTO: 64 %
MCH RBC QN AUTO: 37.8 PG (ref 26.5–33)
MCHC RBC AUTO-ENTMCNC: 34.6 G/DL (ref 31.5–36.5)
MCV RBC AUTO: 109 FL (ref 78–100)
MONOCYTES # BLD AUTO: 0.3 10E3/UL (ref 0–1.3)
MONOCYTES NFR BLD AUTO: 17 %
NEUTROPHILS # BLD AUTO: 0.3 10E3/UL (ref 1.6–8.3)
NEUTROPHILS NFR BLD AUTO: 16 %
NRBC # BLD AUTO: 0.1 10E3/UL
NRBC BLD AUTO-RTO: 4 /100
PLATELET # BLD AUTO: 162 10E3/UL (ref 150–450)
RBC # BLD AUTO: 2.91 10E6/UL (ref 3.8–5.2)
WBC # BLD AUTO: 2 10E3/UL (ref 4–11)

## 2025-02-17 PROCEDURE — 85004 AUTOMATED DIFF WBC COUNT: CPT | Mod: ZL

## 2025-02-17 PROCEDURE — 85048 AUTOMATED LEUKOCYTE COUNT: CPT | Mod: ZL

## 2025-02-17 PROCEDURE — 86900 BLOOD TYPING SEROLOGIC ABO: CPT

## 2025-02-17 PROCEDURE — 36415 COLL VENOUS BLD VENIPUNCTURE: CPT | Mod: ZL

## 2025-02-17 NOTE — TELEPHONE ENCOUNTER
DATE/TIME OF CALL RECEIVED FROM LAB:  02/17/25 at 12:09 PM   LAB TEST:  ANC  LAB VALUE:  0.3  PROVIDER NOTIFIED?: Yes  PROVIDER NAME: Magalis Lopez APRN CNP    DATE/TIME LAB VALUE REPORTED TO PROVIDER: 12:10 PM   MECHANISM OF PROVIDER NOTIFICATION: Phone Call  PROVIDER RESPONSE: follow up with Dr. Harris after bone marrow biopsy.  Hgb was 11.  Patient notified.   Lisha Winkler RN...........2/17/2025 12:11 PM

## 2025-02-17 NOTE — TELEPHONE ENCOUNTER
Surgical. Patient discharged with surgical follow-up only. No TCM call required per policy.   Krysten Sanchez RN on 2/17/2025 at 3:36 PM

## 2025-02-17 NOTE — TELEPHONE ENCOUNTER
----- Message from Samina Harris sent at 2/17/2025 10:23 AM CST -----  Can you call her and check if she is on any rheumatological medication. She has a history of RA documented.

## 2025-02-17 NOTE — TELEPHONE ENCOUNTER
Patient states that she continues to be fatigued. She reports that she does not take any medication for RA. Reports that she is allergic to all of them.

## 2025-02-19 ENCOUNTER — TELEPHONE (OUTPATIENT)
Dept: FAMILY MEDICINE | Facility: OTHER | Age: 60
End: 2025-02-19
Payer: MEDICARE

## 2025-02-19 LAB — PATH REPORT.FINAL DX SPEC: NORMAL

## 2025-02-19 NOTE — TELEPHONE ENCOUNTER
Reason for call: Request for results.    Name of test or procedure: Biopsy     Date of test or procedure: 2/13/25    Location of test or procedure: [facility]    Preferred method for responding to this message: Telephone Call    Phone number patient can be reached at: Cell number on file:    Telephone Information:   Mobile [phone]       If we can't reach you directly, may we leave a detailed response at the number you provided?Yes    Allie He on 2/19/2025 at 4:38 PM

## 2025-02-22 LAB — NONINV COLON CA DNA+OCC BLD SCRN STL QL: NEGATIVE

## 2025-02-24 ENCOUNTER — PATIENT OUTREACH (OUTPATIENT)
Dept: ONCOLOGY | Facility: CLINIC | Age: 60
End: 2025-02-24
Payer: MEDICARE

## 2025-02-24 ENCOUNTER — PATIENT OUTREACH (OUTPATIENT)
Dept: ONCOLOGY | Facility: OTHER | Age: 60
End: 2025-02-24
Payer: MEDICARE

## 2025-02-24 LAB — PATH REPORT.FINAL DX SPEC: NORMAL

## 2025-02-24 NOTE — PROGRESS NOTES
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       Latest Reference Range & Units 02/13/25 10:00 02/17/25 11:09   WBC 4.0 - 11.0 10e3/uL 2.0 (L) 2.0 (L)   Hemoglobin 11.7 - 15.7 g/dL 7.8 (L) 11.0 (L)   Hematocrit 35.0 - 47.0 % 22.8 (L) 31.8 (L)   Platelet Count 150 - 450 10e3/uL 162 162   RBC Count 3.80 - 5.20 10e6/uL 1.95 (L) 2.91 (L)   MCV 78 - 100 fL 117 (H) 109 (H)   MCH 26.5 - 33.0 pg 40.0 (H) 37.8 (H)   MCHC 31.5 - 36.5 g/dL 34.2 34.6   RDW 10.0 - 15.0 % 13.5 18.0 (H)   % Neutrophils % 15 16   % Lymphocytes % 71 64   % Monocytes % 14 17   % Eosinophils % 1 2   % Basophils % 0 1   Absolute Basophils 0.0 - 0.2 10e3/uL 0.0 0.0   Absolute Eosinophils 0.0 - 0.7 10e3/uL 0.0 0.0   Absolute Immature Granulocytes <=0.4 10e3/uL 0.0 0.0   Absolute Lymphocytes 0.8 - 5.3 10e3/uL 1.4 1.3   Absolute Monocytes 0.0 - 1.3 10e3/uL 0.3 0.3   % Immature Granulocytes % 0 0   Absolute Neutrophils 1.6 - 8.3 10e3/uL 0.3 (LL) 0.3 (LL)   Absolute NRBCs 10e3/uL 0.0 0.1   NRBCs per 100 WBC <1 /100 2 (H) 4 (H)   (LL): Data is critically low  (L): Data is abnormally low  (H): Data is abnormally high  Records Location: [facility] Everywhere     Additional testing needed prior to consult:     ?    Referral updates and Plan:     02/24/2025 4:34 PM -  Referral received and reviewed. Message received from Dr. Herr requesting consult be set up.  Referral did not fall into work queue.  Consulted with Dr. Jaimes who states pt needs induction therapy inpatient ASAP.  Per  Dr. Herr, pt did not want to be admitted on Friday.  Messaged Dr. Harris with Dr. Blake recommendations.     Lore Kauffman, JOHNN, RN  Hematology/Oncology Nurse Navigator  [facility]  [phone] / [phone]

## 2025-02-24 NOTE — PROGRESS NOTES
Spoke with [facility] and they are reviewing referral now and patient should expect call in 1-2 days.  Lisha Winkler RN...........2/24/2025 9:30 AM

## 2025-02-25 ENCOUNTER — PATIENT OUTREACH (OUTPATIENT)
Dept: ONCOLOGY | Facility: OTHER | Age: 60
End: 2025-02-25
Payer: MEDICARE

## 2025-02-25 ENCOUNTER — LAB (OUTPATIENT)
Dept: LAB | Facility: OTHER | Age: 60
End: 2025-02-25
Payer: MEDICARE

## 2025-02-25 ENCOUNTER — TELEPHONE (OUTPATIENT)
Dept: ONCOLOGY | Facility: OTHER | Age: 60
End: 2025-02-25

## 2025-02-25 DIAGNOSIS — D53.9 MACROCYTIC ANEMIA: Primary | ICD-10-CM

## 2025-02-25 DIAGNOSIS — D53.9 MACROCYTIC ANEMIA: ICD-10-CM

## 2025-02-25 LAB
BASOPHILS # BLD MANUAL: 0 10E3/UL (ref 0–0.2)
BASOPHILS NFR BLD MANUAL: 0 %
EOSINOPHIL # BLD MANUAL: 0 10E3/UL (ref 0–0.7)
EOSINOPHIL NFR BLD MANUAL: 0 %
ERYTHROCYTE [DISTWIDTH] IN BLOOD BY AUTOMATED COUNT: 17.2 % (ref 10–15)
HCT VFR BLD AUTO: 28.2 % (ref 35–47)
HGB BLD-MCNC: 9.7 G/DL (ref 11.7–15.7)
LYMPHOCYTES # BLD MANUAL: 2.1 10E3/UL (ref 0.8–5.3)
LYMPHOCYTES NFR BLD MANUAL: 79 %
MCH RBC QN AUTO: 37.9 PG (ref 26.5–33)
MCHC RBC AUTO-ENTMCNC: 34.4 G/DL (ref 31.5–36.5)
MCV RBC AUTO: 110 FL (ref 78–100)
MONOCYTES # BLD MANUAL: 0.2 10E3/UL (ref 0–1.3)
MONOCYTES NFR BLD MANUAL: 7 %
NEUTROPHILS # BLD MANUAL: 0.4 10E3/UL (ref 1.6–8.3)
NEUTROPHILS NFR BLD MANUAL: 14 %
NRBC # BLD AUTO: 0.2 10E3/UL
NRBC BLD MANUAL-RTO: 7 %
PLAT MORPH BLD: ABNORMAL
PLATELET # BLD AUTO: 140 10E3/UL (ref 150–450)
POLYCHROMASIA BLD QL SMEAR: SLIGHT
RBC # BLD AUTO: 2.56 10E6/UL (ref 3.8–5.2)
RBC MORPH BLD: ABNORMAL
WBC # BLD AUTO: 2.7 10E3/UL (ref 4–11)

## 2025-02-25 PROCEDURE — 85007 BL SMEAR W/DIFF WBC COUNT: CPT | Mod: ZL

## 2025-02-25 PROCEDURE — 85014 HEMATOCRIT: CPT | Mod: ZL

## 2025-02-25 PROCEDURE — 36415 COLL VENOUS BLD VENIPUNCTURE: CPT | Mod: ZL

## 2025-02-25 NOTE — TELEPHONE ENCOUNTER
Notified Alejandra that she will be admitted at the [facility]. She is ok with the plan.  She does report vomiting after meals frequently, which causes her to be extremely fatigued. She reports she is able to keep fluids down.  Please address if there is anything that she needs.   Lisha Winkler RN...........2/25/2025 9:38 AM

## 2025-02-25 NOTE — TELEPHONE ENCOUNTER
She came in today for CBC. Hgb 9.7.    DATE/TIME OF CALL RECEIVED FROM LAB:  02/25/25 at 2:13 PM   LAB TEST:  ANC  LAB VALUE:  0.4  PROVIDER NOTIFIED?: Yes  PROVIDER NAME: Dr. Harris   DATE/TIME LAB VALUE REPORTED TO PROVIDER: 2:13 PM   MECHANISM OF PROVIDER NOTIFICATION:  phone note  PROVIDER RESPONSE:

## 2025-02-25 NOTE — PROGRESS NOTES
Per Dr. Harris should come in for CBC if not leaving for [facility].  Lisha Winkler RN...........2/25/2025 10:18 AM

## 2025-02-26 ENCOUNTER — APPOINTMENT (OUTPATIENT)
Dept: GENERAL RADIOLOGY | Facility: CLINIC | Age: 60
End: 2025-02-26
Payer: MEDICARE

## 2025-02-26 ENCOUNTER — PATIENT OUTREACH (OUTPATIENT)
Dept: ONCOLOGY | Facility: OTHER | Age: 60
End: 2025-02-26
Payer: MEDICARE

## 2025-02-26 ENCOUNTER — APPOINTMENT (OUTPATIENT)
Dept: CARDIOLOGY | Facility: CLINIC | Age: 60
DRG: 834 | End: 2025-02-26
Payer: MEDICARE

## 2025-02-26 ENCOUNTER — DOCUMENTATION ONLY (OUTPATIENT)
Dept: ONCOLOGY | Facility: CLINIC | Age: 60
End: 2025-02-26

## 2025-02-26 ENCOUNTER — HOSPITAL ENCOUNTER (INPATIENT)
Facility: CLINIC | Age: 60
End: 2025-02-26
Attending: STUDENT IN AN ORGANIZED HEALTH CARE EDUCATION/TRAINING PROGRAM | Admitting: STUDENT IN AN ORGANIZED HEALTH CARE EDUCATION/TRAINING PROGRAM
Payer: MEDICARE

## 2025-02-26 DIAGNOSIS — G89.18 POST-OP PAIN: ICD-10-CM

## 2025-02-26 DIAGNOSIS — E78.2 MIXED HYPERLIPIDEMIA: ICD-10-CM

## 2025-02-26 DIAGNOSIS — R11.2 NAUSEA AND VOMITING, UNSPECIFIED VOMITING TYPE: ICD-10-CM

## 2025-02-26 DIAGNOSIS — J44.1 COPD EXACERBATION (H): ICD-10-CM

## 2025-02-26 DIAGNOSIS — M79.605 PAIN OF LEFT LOWER EXTREMITY: ICD-10-CM

## 2025-02-26 DIAGNOSIS — J43.9 PULMONARY EMPHYSEMA, UNSPECIFIED EMPHYSEMA TYPE (H): ICD-10-CM

## 2025-02-26 DIAGNOSIS — C92.00 ACUTE MYELOID LEUKEMIA NOT HAVING ACHIEVED REMISSION (H): Primary | ICD-10-CM

## 2025-02-26 DIAGNOSIS — K21.9 GASTROESOPHAGEAL REFLUX DISEASE, UNSPECIFIED WHETHER ESOPHAGITIS PRESENT: ICD-10-CM

## 2025-02-26 DIAGNOSIS — E55.9 VITAMIN D DEFICIENCY: ICD-10-CM

## 2025-02-26 PROBLEM — D72.819 LEUKOPENIA: Status: ACTIVE | Noted: 2025-02-26

## 2025-02-26 PROBLEM — D64.9 ANEMIA: Status: ACTIVE | Noted: 2025-02-26

## 2025-02-26 LAB
ALBUMIN SERPL BCG-MCNC: 4 G/DL (ref 3.5–5.2)
ALBUMIN UR-MCNC: NEGATIVE MG/DL
ALP SERPL-CCNC: 175 U/L (ref 40–150)
ALT SERPL W P-5'-P-CCNC: 17 U/L (ref 0–50)
ANION GAP SERPL CALCULATED.3IONS-SCNC: 12 MMOL/L (ref 7–15)
APPEARANCE UR: CLEAR
APTT PPP: 41 SECONDS (ref 22–38)
AST SERPL W P-5'-P-CCNC: 27 U/L (ref 0–45)
BACTERIA #/AREA URNS HPF: ABNORMAL /HPF
BILIRUB SERPL-MCNC: 0.9 MG/DL
BILIRUB UR QL STRIP: NEGATIVE
BUN SERPL-MCNC: 11.2 MG/DL (ref 6–20)
C PNEUM DNA SPEC QL NAA+PROBE: NOT DETECTED
CALCIUM SERPL-MCNC: 9 MG/DL (ref 8.8–10.4)
CHLORIDE SERPL-SCNC: 98 MMOL/L (ref 98–107)
COLOR UR AUTO: ABNORMAL
CREAT SERPL-MCNC: 0.77 MG/DL (ref 0.51–0.95)
EGFRCR SERPLBLD CKD-EPI 2021: 89 ML/MIN/1.73M2
ERYTHROCYTE [DISTWIDTH] IN BLOOD BY AUTOMATED COUNT: 16.9 % (ref 10–15)
FIBRINOGEN PPP-MCNC: 563 MG/DL (ref 170–510)
FLUAV H1 2009 PAND RNA SPEC QL NAA+PROBE: DETECTED
FLUAV H1 RNA SPEC QL NAA+PROBE: NOT DETECTED
FLUAV H3 RNA SPEC QL NAA+PROBE: NOT DETECTED
FLUAV RNA SPEC QL NAA+PROBE: DETECTED
FLUAV RNA SPEC QL NAA+PROBE: POSITIVE
FLUBV RNA RESP QL NAA+PROBE: NEGATIVE
FLUBV RNA SPEC QL NAA+PROBE: NOT DETECTED
GLUCOSE SERPL-MCNC: 127 MG/DL (ref 70–99)
GLUCOSE UR STRIP-MCNC: NEGATIVE MG/DL
HADV DNA SPEC QL NAA+PROBE: NOT DETECTED
HBV CORE AB SERPL QL IA: NONREACTIVE
HBV SURFACE AB SERPL IA-ACNC: <3.5 M[IU]/ML
HBV SURFACE AB SERPL IA-ACNC: NONREACTIVE M[IU]/ML
HBV SURFACE AG SERPL QL IA: NONREACTIVE
HCO3 SERPL-SCNC: 24 MMOL/L (ref 22–29)
HCOV PNL SPEC NAA+PROBE: NOT DETECTED
HCT VFR BLD AUTO: 28.1 % (ref 35–47)
HCV AB SERPL QL IA: NONREACTIVE
HGB BLD-MCNC: 9.9 G/DL (ref 11.7–15.7)
HGB UR QL STRIP: NEGATIVE
HIV 1+2 AB+HIV1 P24 AG SERPL QL IA: NONREACTIVE
HMPV RNA SPEC QL NAA+PROBE: NOT DETECTED
HPIV1 RNA SPEC QL NAA+PROBE: NOT DETECTED
HPIV2 RNA SPEC QL NAA+PROBE: NOT DETECTED
HPIV3 RNA SPEC QL NAA+PROBE: NOT DETECTED
HPIV4 RNA SPEC QL NAA+PROBE: NOT DETECTED
INR PPP: 1.1 (ref 0.85–1.15)
KETONES UR STRIP-MCNC: NEGATIVE MG/DL
LDH SERPL L TO P-CCNC: 316 U/L (ref 0–250)
LEUKOCYTE ESTERASE UR QL STRIP: NEGATIVE
LVEF ECHO: NORMAL
M PNEUMO DNA SPEC QL NAA+PROBE: NOT DETECTED
MAGNESIUM SERPL-MCNC: 2.2 MG/DL (ref 1.7–2.3)
MCH RBC QN AUTO: 37.8 PG (ref 26.5–33)
MCHC RBC AUTO-ENTMCNC: 35.2 G/DL (ref 31.5–36.5)
MCV RBC AUTO: 107 FL (ref 78–100)
MRSA DNA SPEC QL NAA+PROBE: NEGATIVE
NITRATE UR QL: NEGATIVE
PH UR STRIP: 7 [PH] (ref 5–7)
PHOSPHATE SERPL-MCNC: 3.2 MG/DL (ref 2.5–4.5)
PLATELET # BLD AUTO: 139 10E3/UL (ref 150–450)
POTASSIUM SERPL-SCNC: 4.2 MMOL/L (ref 3.4–5.3)
PROT SERPL-MCNC: 7.4 G/DL (ref 6.4–8.3)
RBC # BLD AUTO: 2.62 10E6/UL (ref 3.8–5.2)
RBC URINE: <1 /HPF
RSV RNA SPEC NAA+PROBE: NEGATIVE
RSV RNA SPEC QL NAA+PROBE: NOT DETECTED
RSV RNA SPEC QL NAA+PROBE: NOT DETECTED
RV+EV RNA SPEC QL NAA+PROBE: NOT DETECTED
SA TARGET DNA: NEGATIVE
SARS-COV-2 RNA RESP QL NAA+PROBE: NEGATIVE
SODIUM SERPL-SCNC: 134 MMOL/L (ref 135–145)
SP GR UR STRIP: 1.01 (ref 1–1.03)
URATE SERPL-MCNC: 4.3 MG/DL (ref 2.4–5.7)
UROBILINOGEN UR STRIP-MCNC: NORMAL MG/DL
WBC # BLD AUTO: 3.2 10E3/UL (ref 4–11)
WBC URINE: 4 /HPF

## 2025-02-26 PROCEDURE — 85610 PROTHROMBIN TIME: CPT

## 2025-02-26 PROCEDURE — 99222 1ST HOSP IP/OBS MODERATE 55: CPT

## 2025-02-26 PROCEDURE — 83615 LACTATE (LD) (LDH) ENZYME: CPT

## 2025-02-26 PROCEDURE — 258N000003 HC RX IP 258 OP 636

## 2025-02-26 PROCEDURE — 85730 THROMBOPLASTIN TIME PARTIAL: CPT

## 2025-02-26 PROCEDURE — 93356 MYOCRD STRAIN IMG SPCKL TRCK: CPT

## 2025-02-26 PROCEDURE — 250N000011 HC RX IP 250 OP 636

## 2025-02-26 PROCEDURE — 71045 X-RAY EXAM CHEST 1 VIEW: CPT | Mod: 26 | Performed by: RADIOLOGY

## 2025-02-26 PROCEDURE — 86696 HERPES SIMPLEX TYPE 2 TEST: CPT

## 2025-02-26 PROCEDURE — 85007 BL SMEAR W/DIFF WBC COUNT: CPT

## 2025-02-26 PROCEDURE — 36415 COLL VENOUS BLD VENIPUNCTURE: CPT

## 2025-02-26 PROCEDURE — 81378 HLA I & II TYPING HR: CPT

## 2025-02-26 PROCEDURE — 250N000013 HC RX MED GY IP 250 OP 250 PS 637

## 2025-02-26 PROCEDURE — 86704 HEP B CORE ANTIBODY TOTAL: CPT

## 2025-02-26 PROCEDURE — 81382 HLA II TYPING 1 LOC HR: CPT

## 2025-02-26 PROCEDURE — 87637 SARSCOV2&INF A&B&RSV AMP PRB: CPT

## 2025-02-26 PROCEDURE — 87389 HIV-1 AG W/HIV-1&-2 AB AG IA: CPT

## 2025-02-26 PROCEDURE — 80051 ELECTROLYTE PANEL: CPT

## 2025-02-26 PROCEDURE — 99418 PROLNG IP/OBS E/M EA 15 MIN: CPT | Performed by: STUDENT IN AN ORGANIZED HEALTH CARE EDUCATION/TRAINING PROGRAM

## 2025-02-26 PROCEDURE — 84550 ASSAY OF BLOOD/URIC ACID: CPT

## 2025-02-26 PROCEDURE — 87633 RESP VIRUS 12-25 TARGETS: CPT

## 2025-02-26 PROCEDURE — 83735 ASSAY OF MAGNESIUM: CPT

## 2025-02-26 PROCEDURE — 87581 M.PNEUMON DNA AMP PROBE: CPT

## 2025-02-26 PROCEDURE — 86644 CMV ANTIBODY: CPT

## 2025-02-26 PROCEDURE — 87641 MR-STAPH DNA AMP PROBE: CPT

## 2025-02-26 PROCEDURE — 120N000002 HC R&B MED SURG/OB UMMC

## 2025-02-26 PROCEDURE — 93356 MYOCRD STRAIN IMG SPCKL TRCK: CPT | Performed by: INTERNAL MEDICINE

## 2025-02-26 PROCEDURE — 86803 HEPATITIS C AB TEST: CPT

## 2025-02-26 PROCEDURE — 85014 HEMATOCRIT: CPT

## 2025-02-26 PROCEDURE — 84100 ASSAY OF PHOSPHORUS: CPT

## 2025-02-26 PROCEDURE — 87340 HEPATITIS B SURFACE AG IA: CPT

## 2025-02-26 PROCEDURE — 85384 FIBRINOGEN ACTIVITY: CPT

## 2025-02-26 PROCEDURE — 86665 EPSTEIN-BARR CAPSID VCA: CPT

## 2025-02-26 PROCEDURE — 999N000128 HC STATISTIC PERIPHERAL IV START W/O US GUIDANCE

## 2025-02-26 PROCEDURE — 99223 1ST HOSP IP/OBS HIGH 75: CPT | Mod: 24 | Performed by: STUDENT IN AN ORGANIZED HEALTH CARE EDUCATION/TRAINING PROGRAM

## 2025-02-26 PROCEDURE — 85041 AUTOMATED RBC COUNT: CPT

## 2025-02-26 PROCEDURE — 86706 HEP B SURFACE ANTIBODY: CPT

## 2025-02-26 PROCEDURE — 87086 URINE CULTURE/COLONY COUNT: CPT

## 2025-02-26 PROCEDURE — 84460 ALANINE AMINO (ALT) (SGPT): CPT

## 2025-02-26 PROCEDURE — 81001 URINALYSIS AUTO W/SCOPE: CPT

## 2025-02-26 PROCEDURE — 71045 X-RAY EXAM CHEST 1 VIEW: CPT

## 2025-02-26 PROCEDURE — 93306 TTE W/DOPPLER COMPLETE: CPT | Mod: 26 | Performed by: INTERNAL MEDICINE

## 2025-02-26 RX ORDER — LISINOPRIL 10 MG/1
10 TABLET ORAL DAILY
Status: DISCONTINUED | OUTPATIENT
Start: 2025-02-27 | End: 2025-04-06 | Stop reason: HOSPADM

## 2025-02-26 RX ORDER — IPRATROPIUM BROMIDE AND ALBUTEROL SULFATE 2.5; .5 MG/3ML; MG/3ML
1 SOLUTION RESPIRATORY (INHALATION) EVERY 4 HOURS PRN
Status: DISCONTINUED | OUTPATIENT
Start: 2025-02-26 | End: 2025-04-06 | Stop reason: HOSPADM

## 2025-02-26 RX ORDER — VITAMIN B COMPLEX
1000 TABLET ORAL DAILY
Status: DISCONTINUED | OUTPATIENT
Start: 2025-02-27 | End: 2025-04-06 | Stop reason: HOSPADM

## 2025-02-26 RX ORDER — ACYCLOVIR 400 MG/1
400 TABLET ORAL 2 TIMES DAILY
Status: DISCONTINUED | OUTPATIENT
Start: 2025-02-26 | End: 2025-04-06 | Stop reason: HOSPADM

## 2025-02-26 RX ORDER — SUMATRIPTAN 50 MG/1
50 TABLET, FILM COATED ORAL
Status: DISCONTINUED | OUTPATIENT
Start: 2025-02-26 | End: 2025-04-06 | Stop reason: HOSPADM

## 2025-02-26 RX ORDER — FLUTICASONE PROPIONATE 50 MCG
2 SPRAY, SUSPENSION (ML) NASAL 2 TIMES DAILY PRN
Status: DISCONTINUED | OUTPATIENT
Start: 2025-02-26 | End: 2025-04-06 | Stop reason: HOSPADM

## 2025-02-26 RX ORDER — POLYETHYLENE GLYCOL 3350 17 G/17G
17 POWDER, FOR SOLUTION ORAL 2 TIMES DAILY PRN
Status: DISCONTINUED | OUTPATIENT
Start: 2025-02-26 | End: 2025-03-10

## 2025-02-26 RX ORDER — ONDANSETRON 2 MG/ML
4 INJECTION INTRAMUSCULAR; INTRAVENOUS EVERY 8 HOURS PRN
Status: DISCONTINUED | OUTPATIENT
Start: 2025-02-26 | End: 2025-04-06 | Stop reason: HOSPADM

## 2025-02-26 RX ORDER — ENOXAPARIN SODIUM 100 MG/ML
40 INJECTION SUBCUTANEOUS EVERY 24 HOURS
Status: DISCONTINUED | OUTPATIENT
Start: 2025-02-26 | End: 2025-03-13

## 2025-02-26 RX ORDER — ONDANSETRON 4 MG/1
4 TABLET, FILM COATED ORAL EVERY 8 HOURS PRN
Status: DISCONTINUED | OUTPATIENT
Start: 2025-02-26 | End: 2025-04-06 | Stop reason: HOSPADM

## 2025-02-26 RX ORDER — ONDANSETRON 4 MG/1
4 TABLET, ORALLY DISINTEGRATING ORAL EVERY 8 HOURS PRN
Status: DISCONTINUED | OUTPATIENT
Start: 2025-02-26 | End: 2025-04-06 | Stop reason: HOSPADM

## 2025-02-26 RX ORDER — ALLOPURINOL 300 MG/1
300 TABLET ORAL DAILY
Status: COMPLETED | OUTPATIENT
Start: 2025-02-26 | End: 2025-03-11

## 2025-02-26 RX ORDER — LIDOCAINE 40 MG/G
CREAM TOPICAL
Status: DISCONTINUED | OUTPATIENT
Start: 2025-02-26 | End: 2025-04-06 | Stop reason: HOSPADM

## 2025-02-26 RX ORDER — ACETAMINOPHEN 325 MG/1
650 TABLET ORAL EVERY 4 HOURS PRN
Status: DISCONTINUED | OUTPATIENT
Start: 2025-02-26 | End: 2025-04-06 | Stop reason: HOSPADM

## 2025-02-26 RX ORDER — AMOXICILLIN 250 MG
2 CAPSULE ORAL 2 TIMES DAILY PRN
Status: DISCONTINUED | OUTPATIENT
Start: 2025-02-26 | End: 2025-03-11

## 2025-02-26 RX ORDER — PAROXETINE 20 MG/1
20 TABLET, FILM COATED ORAL EVERY MORNING
Status: DISCONTINUED | OUTPATIENT
Start: 2025-02-27 | End: 2025-04-06 | Stop reason: HOSPADM

## 2025-02-26 RX ORDER — AMOXICILLIN 250 MG
1 CAPSULE ORAL 2 TIMES DAILY PRN
Status: DISCONTINUED | OUTPATIENT
Start: 2025-02-26 | End: 2025-03-11

## 2025-02-26 RX ORDER — LEVOFLOXACIN 250 MG/1
250 TABLET, FILM COATED ORAL DAILY
Status: DISCONTINUED | OUTPATIENT
Start: 2025-02-26 | End: 2025-03-01

## 2025-02-26 RX ORDER — PROCHLORPERAZINE MALEATE 5 MG/1
5 TABLET ORAL EVERY 6 HOURS PRN
Status: DISCONTINUED | OUTPATIENT
Start: 2025-02-26 | End: 2025-04-06 | Stop reason: HOSPADM

## 2025-02-26 RX ORDER — CYCLOBENZAPRINE HCL 5 MG
10 TABLET ORAL
Status: DISCONTINUED | OUTPATIENT
Start: 2025-02-26 | End: 2025-04-06 | Stop reason: HOSPADM

## 2025-02-26 RX ORDER — FLUTICASONE FUROATE AND VILANTEROL 100; 25 UG/1; UG/1
1 POWDER RESPIRATORY (INHALATION) DAILY
Status: DISCONTINUED | OUTPATIENT
Start: 2025-02-27 | End: 2025-04-06 | Stop reason: HOSPADM

## 2025-02-26 RX ORDER — OSELTAMIVIR PHOSPHATE 75 MG/1
75 CAPSULE ORAL 2 TIMES DAILY
Status: COMPLETED | OUTPATIENT
Start: 2025-02-26 | End: 2025-03-03

## 2025-02-26 RX ORDER — FAMOTIDINE 10 MG
10 TABLET ORAL 2 TIMES DAILY
Status: DISCONTINUED | OUTPATIENT
Start: 2025-02-26 | End: 2025-03-09

## 2025-02-26 RX ORDER — CEFEPIME HYDROCHLORIDE 2 G/1
2 INJECTION, POWDER, FOR SOLUTION INTRAVENOUS EVERY 8 HOURS
Status: DISCONTINUED | OUTPATIENT
Start: 2025-02-26 | End: 2025-03-01

## 2025-02-26 RX ORDER — ALBUTEROL SULFATE 90 UG/1
1-2 INHALANT RESPIRATORY (INHALATION) EVERY 4 HOURS PRN
Status: DISCONTINUED | OUTPATIENT
Start: 2025-02-26 | End: 2025-04-06 | Stop reason: HOSPADM

## 2025-02-26 RX ADMIN — GABAPENTIN 400 MG: 300 CAPSULE ORAL at 20:41

## 2025-02-26 RX ADMIN — ENOXAPARIN SODIUM 40 MG: 40 INJECTION SUBCUTANEOUS at 20:42

## 2025-02-26 RX ADMIN — ALLOPURINOL 300 MG: 300 TABLET ORAL at 14:00

## 2025-02-26 RX ADMIN — LEVOFLOXACIN 250 MG: 250 TABLET, FILM COATED ORAL at 14:00

## 2025-02-26 RX ADMIN — ACYCLOVIR 400 MG: 400 TABLET ORAL at 20:42

## 2025-02-26 RX ADMIN — OSELTAMIVIR PHOSPHATE 75 MG: 75 CAPSULE ORAL at 20:42

## 2025-02-26 RX ADMIN — CEFEPIME HYDROCHLORIDE 2 G: 2 INJECTION, POWDER, FOR SOLUTION INTRAVENOUS at 22:15

## 2025-02-26 RX ADMIN — ACETAMINOPHEN 650 MG: 325 TABLET, FILM COATED ORAL at 14:00

## 2025-02-26 RX ADMIN — FAMOTIDINE 10 MG: 10 TABLET ORAL at 20:42

## 2025-02-26 RX ADMIN — ACETAMINOPHEN 650 MG: 325 TABLET, FILM COATED ORAL at 19:44

## 2025-02-26 RX ADMIN — GABAPENTIN 400 MG: 300 CAPSULE ORAL at 16:28

## 2025-02-26 RX ADMIN — MICAFUNGIN SODIUM 50 MG: 50 INJECTION, POWDER, LYOPHILIZED, FOR SOLUTION INTRAVENOUS at 20:55

## 2025-02-26 ASSESSMENT — ACTIVITIES OF DAILY LIVING (ADL)
ADLS_ACUITY_SCORE: 26
ADLS_ACUITY_SCORE: 26
ADLS_ACUITY_SCORE: 18
ADLS_ACUITY_SCORE: 24
ADLS_ACUITY_SCORE: 26
ADLS_ACUITY_SCORE: 26
ADLS_ACUITY_SCORE: 41
ADLS_ACUITY_SCORE: 24
ADLS_ACUITY_SCORE: 26
ADLS_ACUITY_SCORE: 18
ADLS_ACUITY_SCORE: 18

## 2025-02-26 NOTE — PROGRESS NOTES
[facility] Medicine Cross Cover Note     February 26, 2025 5:46 pm    Pt tested positive for influenza A     Labs reviewed - Flu/RSV/COVID; positive for influenza A       Actions taken:   -ordered tamiflu 75 mg BID for 5 days  - supplemental O2 if Sp02 falls below 88%. Patient currently on room air.       February 26, 7:39 pm  RN messaged and pt was fevering at 100.7. Other vitals 98% on RA, /65. RR 16. ANC is 0.5.     Actions taken:  - started cefepime for neutropenic fever coverage  - chest X-ray ordered and reviewed - no consolidation   - MRSA nasal swab ordered and reviewed- negative   - held levofloxacin prophylaxis.   - blood cultures ordered     I spent 20 minutes on the date of the encounter doing chart review, history and exam, documentation and further activities noted above.     Paulo Enriquez, MS4  [employer]        Physician Attestation   I, Melanie Kulkarni MD, was present with the medical/[employer] student who participated in the service and in the documentation of the note.  I have verified the history and personally performed the medical decision making.  I agree with the assessment and plan of care as documented in the note.        Melanie Kulkarni MD  Date of Service: 02/26/25  Total time: 20 minutes

## 2025-02-26 NOTE — PROGRESS NOTES
Spoke to patient and she did receive call from [facility] and is leaving today for admission. Advised patient to call if she needs anything.  Lisha Winkler RN...........2/26/2025 8:31 AM

## 2025-02-26 NOTE — H&P
[facility]    History & Physical  Hematology / Oncology     Date of Admission:  2/26/25  Date of Service (when I saw the patient):  02/26/2025    Assessment & Plan   Alejandra Villegas is a 59 year old female who presents with PMHx of COPD, migraines, rheumatoid arthritis, and anxiety. She was being followed at outside hematology/oncology clinic for cytopenias. Completed Bmbx at OSH 2/13/24 showing hypercellular marrow w/ 4% blasts and NPM1, IDH2, and NRAS mutations. She was admitted to [facility] 2/26/25 for further workup and management.     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 Hgb 12. Was referred to local hematology/oncology clinic for further evaluation and seen by Dr. Harris. Bmbx 2/10/25 done at OSH showed hypercellular marrow w/ trilineage hematopoiesis w/ dyspoiesis with mildly elevated blasts of 4% on morphology. NGS w/ NPM1, IDH2, and NRAS mutations. She was admitted to [facility] 2/26/25 for further workup and management.   - Ordered baseline echo and EKG on admission, pending  - Ordered baseline viral serologies on admission: HepB, HepC, HIV, HSV 1/2, CMV, EBV, pending  - Sent HLA Typing BMT complete on admission. Will require inpatient BMT NT consult at some point during hospital stay; will need to message BMT to arrange this.  - Requested and confirmed request of OSH BMBx slides and notified [facility] special heme lab.   - Further treatment decisions forthcoming on the basis of final BMBx results and discussions with patient/family; however, anticipate starting induction chemotherapy within the next 1-2 days. Will defer PICC placement at this time.     # Anemia  # Leukopenia  Secondary to underlying hematologic malignancy.  - Follow daily CBC with differential  - Transfuse to keep Hgb >7, plt >10K  - Blood  consent obtained 2/26/25 on admission and placed in patients paper chart.     # Risk for TLS  Secondary to hematologic malignancy .  - Started allopurinol 300 mg daily on admission  - Trend TLS labs daily for now; can increase frequency PRN with treatment initiation  - Management of TLS:  If Cr increasing, start gentle IVF  If uric acid >8, give rasburicase 6 mg x1 dose  If phos >5, start Phoslo 667 mg TID  Correction of additional electrolyte abnormalities PRN per usual means    # Risk for DIC  Secondary to underlying hematologic malignancy.  - Follow daily coags  - Transfuse cryo to keep fibrinogen >100, FFP to keep INR <1.8    ID  #ID prophylaxis  Acyclovir 400 mg BID  Levaquin 250 mg daily  Micafungin 50 mg daily   - PLC placed on admission for posa/vori coverage    #Subjective fever  #Rhinitis  On admission, patient noted subjective fever with chills and rhinitis 2/25 PM prior to admission. She denies other infectious symptoms such as headaches, new/worsening shortness of breath or cough beyond baseline w/ COPD hx, diarrhea, abdominal pain/cramping. Afebrile and otherwise HDS on admission; basic ID workup completed w/ RVP/COVID/Influenza ordered. If she were to become febrile then would complete BCx2, UA/UC, CXR, other ID workup as indicated and broaden to cefepime.   - RVP/COVID/Influenza, pending    GI/  #Nausea/Vomiting  Patient noted ongoing nausea w/ occasional vomiting starting Dec 2024. Has been managed with prn zofran.   - PRN zofran and compazine    #Urinary frequency  #Dysuria  On admission, patient noted longstanding history of urinary frequency though new mild dysuria. Denies hematuria, bladder tenderness of CVA tenderness  - UA and UC ordered, pending    # GERD  Noted h/o on admission, noted symptoms increasing leading up to admission  - Start famotidine 10 mg BID    PULM  #COPD  Longstanding history of COPD. On admission patient reports symptoms are manageable and no recent exacerbations.   -  "Continue PTA Breo Ellipta inhaler and prn DuoNebs     CV  #Essential hypertension  Continue PTA lisinopril    #Mixed hyperlipidemia  Lipid panel have remained at goal, 1/14/25 panel w/ cholesterol 163, , LDL 92, HDL 45. - Held PTA atorvastatin on admission.     RENAL  # H/o CKD, stage 2  Baseline Cr ~ 0.7. On admission Cr 0.77  - Continue to trend on daily labs and encourage PO hydration     NEURO  #Degenerative disc disease, L5-S1  Continue PTA gabapentin    #Chronic migraine w/o aura  Patient noted has been chronic since she was a child. Triggered by \"cracking her neck the wrong way\" and managed with prn regimen below along with resting in a dimly lit room.   - Continue PTA prn sumatriptan and prn cyclobenzaprine    ENDO  #Prediabetes  Last A1c 6.0 x12/9/24. Has been managing with lifestyle modifications.     MISC  #Rheumatoid arthritis   Patient reported trying treatment though was unable to tolerate well. Though notes manages w/ tylenol prn. RF completed at OSH 2/10 > 650. JI negative.      # BROOKS  # MDD  # Coping with new diagnosis  Patient reports good control of anxiety/depressive symptoms prior to admission. Did note feeling overwhelmed by new diagnosis. On admission discussed inpatient services such as health psychology or cordelia, she respectfully declined though will consider.   - Readdress health psych consult as indicated  - Continue PTA paroxetine     #Vit D Deficiency  Continue PTA vit D supplement    #Tobacco use - Patient reported starting at age 16 and has averaged 1.5 ppd up until about Jan 2025 where she has decreased use to about 1/2 ppd. Offered NRT on admission that she respectfully declined.   - Readdress NRT as indicated.     FEN:  -IVF prn and per protocol  -Lyte replacement per protocol  -Regular diet as tolerated    Prophy/Misc:  -GI: Not indicated  -DVT: Lovenox, hold for plts < 50k    Clinically Significant Risk Factors Present on Admission         # Hyponatremia: Lowest Na = 134 " "mmol/L in last 2 days, will monitor as appropriate         # Drug Induced Platelet Defect: home medication list includes an antiplatelet medication   # Hypertension: Noted on problem list      # Anemia: based on hgb <11       # Obesity: Estimated body mass index is 30.28 kg/m  as calculated from the following:    Height as of this encounter: 1.626 m (5' 4\").    Weight as of this encounter: 80 kg (176 lb 6.4 oz).       # Asthma: noted on problem list           Disposition: Admit to hospital for further workup and treatment. Discharge pending treatment decisions and clinical course.  Medically Ready for Discharge: Anticipated in 5+ Days      I spent 90 minutes face-to-face and/or coordinating or discussing care plan. Over 50% of our time on the unit was spent counseling the patient and coordinating care    Patient is seen and examined in conjunction with Dr. Collins and ORA.  Assessment and plan are discussed and delivered to the patient.    Chelsie Murphy PA-C  Hematology/Oncology  Pager: [phone]    Code Status : DNR / DNI    Primary Care Physician   Physician No Ref-Primary    History of Present Illness   History obtained from chart and discussed with the patient.    Alejandra Villegas is a 59 year old female who presents with PMHx of COPD, migraines, rheumatoid arthritis, and anxiety. She was being followed at outside hematology/oncology clinic for cytopenias. Completed Bmbx at OSH 2/13/24 showing hypercellular marrow w/ 4% blasts and NPM1, IDH2, and NRAS mutations. She was admitted to [facility] 2/26/25 for further workup and management.     On admission, Alejandra is feeling okay. Feeling tired and fatigued ongoing since dec 2024 with nausea and occasional vomiting. Felt nauseous on long car ride though feeling better now. Noted subjective fever PM 2/25 prior to admission with chills and rhinitis. She denies other infectious symptoms such as headaches, new/worsening shortness of breath or cough beyond baseline w/ " COPD hx, diarrhea, abdominal pain/cramping. Agreeable to basic ID workup. We discussed at length, indication for admission, transfer of slides from OSH to [facility] to determine definitive diagnosis and next steps in treatment, and plan for initial workup today with labs, echo, EKG. She was appreciative and information; has been feeling overwhelmed with new information. I offered health psychology services that she respectfully declined at this time. I encouraged her to write down additional questions and/or concerns; all addressed at this time to patients stated satisfaction.     Past Medical History    Past Medical History:   Diagnosis Date    Allergic rhinitis 09/27/2011         Disorder of kidney and ureter 08/08/2008    Kidney disease GFR 87    Dorsalgia     No Comments Provided    Generalized anxiety disorder     No Comments Provided    Hidradenitis suppurativa     No Comments Provided    Other disorders of lung (CODE) 08/01/2007    Pulmonary nodules, stable on follow-up chest CT 12/08.  No further imaging recommended.    Other specified disorders of Eustachian tube, unspecified ear 02/27/2012         Personal history of other medical treatment (CODE)     Childbirth x4    Rheumatoid arthritis (H) 02/27/2012         Tobacco use     No Comments Provided       Past Surgical History   Past Surgical History:   Procedure Laterality Date    ARTHROSCOPY KNEE  05/2017    Dr Torres    ARTHROSCOPY KNEE  07/20/2017    Dr Garza, lateral release, meniscal repair    ARTHROTOMY WRIST Left 2011    Dr. Torres    BONE MARROW BIOPSY, BONE SPECIMEN, NEEDLE/TROCAR Left 2/13/2025    Procedure: Bone marrow biopsy, bone specimen, needle/trocar;  Surgeon: Hema Mari MD;  Location:  OR    CHOLECYSTECTOMY  06/2007    Emergency tracheotomy following failed intubation for laparoscopic cholecystectomy.    DILATION AND CURETTAGE  09/2006         HERNIA REPAIR  2007    Incisoinal hernia repair    HYSTERECTOMY TOTAL ABDOMINAL  02/2010          IMPLANT STIMULATOR AND LEADS SACRAL NERVE (STAGE ONE AND TWO) N/A 12/11/2018    Procedure: Interstim Battery Replacement;  Surgeon: Rl Ambriz MD;  Location:  OR    INTERSTIM DEVICE STAGE 2  01/06/2014    Dr. Ambriz [facility]    LAPAROSCOPIC ABLATION ENDOMETRIOSIS  09/2006         OOPHORECTOMY Left 2010     Dr Thorpe    RECONSTRUCT FOREFOOT WITH METATARSOPHALANGEAL (MTP) FUSION Right 5/5/2022    Procedure: Right fibular sesmoid excision and 1st metatarsal phalangeal joint fusion;  Surgeon: Rl Nathan DPM;  Location: [facility] OR    RELEASE CARPAL TUNNEL  02/02/2017         RELEASE PLANTAR FASCIA Left 12/19/2024    Procedure: excision of soft tissue mass left foot,  gastroc recession;  Surgeon: Rl Nathan DPM;  Location:  OR    REMOVE HARDWARE FOOT Right 8/10/2023    Procedure: REMOVAL, HARDWARE, FOOT;  Surgeon: Rl Nathan DPM;  Location: [facility] OR    SALPINGO OOPHORECTOMY,R/L/KELSEY Right 06/1999    Right salpingo-oophorectomy for hemorrhagic corpus luteum cyst    TRACHEOSTOMY  2007    emergency following failed intubation during cholecystectomy    TYMPANOSTOMY, LOCAL/TOPICAL ANESTHESIA  08/2006    PE tube placement       Prior to Admission Medications   Prior to Admission Medications   Prescriptions Last Dose Informant Patient Reported? Taking?   HYDROcodone-acetaminophen (NORCO) 5-325 MG tablet   No No   Sig: Take 1 tablet by mouth nightly as needed for severe pain.   Patient not taking: Reported on 2/6/2025   PARoxetine (PAXIL) 20 MG tablet   No No   Sig: TAKE 1 TABLET (20 MG) BY MOUTH EVERY MORNING   SUMAtriptan (IMITREX) 50 MG tablet   No No   Sig: Take 1 tablet (50 mg) by mouth at onset of headache for migraine May repeat in 2 hours. Max 4 tablets/24 hours.   Vitamin D, Cholecalciferol, 25 MCG (1000 UT) CAPS   No No   Sig: Take 1,000 Units by mouth daily.   albuterol (PROAIR HFA/PROVENTIL HFA/VENTOLIN HFA) 108 (90 Base) MCG/ACT inhaler   No No   Sig: Inhale 1-2 puffs into the lungs every 4 hours as  needed for shortness of breath, wheezing or cough   aspirin (ASPIRIN LOW DOSE) 81 MG EC tablet   No No   Sig: TAKE 1 TABLET (81 MG) BY MOUTH 2 TIMES DAILY.   atorvastatin (LIPITOR) 40 MG tablet   No No   Sig: TAKE 1 TABLET (40 MG) BY MOUTH AT BEDTIME   budesonide-formoterol (SYMBICORT) 80-4.5 MCG/ACT Inhaler   No No   Sig: Inhale 2 puffs into the lungs 2 times daily - Rinse mouth well after use to prevent Thrush   cyclobenzaprine (FLEXERIL) 10 MG tablet   No No   Sig: Take 1 tablet (10 mg) by mouth at bedtime.   Patient taking differently: Take 10 mg by mouth nightly as needed.   fluticasone (FLONASE) 50 MCG/ACT nasal spray   No No   Sig: Spray 2 sprays into both nostrils 2 times daily.   gabapentin (NEURONTIN) 400 MG capsule   No No   Sig: Take 2 capsules (800 mg) by mouth 2 times daily   ipratropium - albuterol 0.5 mg/2.5 mg/3 mL (DUONEB) 0.5-2.5 (3) MG/3ML neb solution   No No   Sig: Take 1 vial (3 mLs) by nebulization every 4 hours as needed for shortness of breath, wheezing or cough   lisinopril (ZESTRIL) 10 MG tablet   No No   Sig: TAKE 1 TABLET (10 MG) BY MOUTH DAILY   ondansetron (ZOFRAN ODT) 4 MG ODT tab   No No   Sig: Take 1 tablet (4 mg) by mouth every 8 hours as needed for nausea or vomiting.   order for DME   No No   Sig: Equipment being ordered: home neb set up with mask and tubing   valACYclovir (VALTREX) 1000 mg tablet   No No   Sig: Take 1 tablet (1,000 mg) by mouth 3 times daily for 7 days.      Facility-Administered Medications: None     Allergies   Allergies   Allergen Reactions    Adhesive Tape     Bee Pollen Swelling     Seasonal    Bee Venom Unknown    Folic Acid Itching    Meloxicam Unknown    Pollen Extract      Seasonal    Amoxicillin Rash    Liquid Adhesive Rash    Nabumetone GI Disturbance     GI upset       Social History   Social History     Socioeconomic History    Marital status:      Spouse name: Not on file    Number of children: Not on file    Years of education: Not on  file    Highest education level: Not on file   Occupational History    Not on file   Tobacco Use    Smoking status: Every Day     Current packs/day: 1.00     Average packs/day: 1 pack/day for 44.2 years (44.2 ttl pk-yrs)     Types: Cigarettes     Start date: 1981     Passive exposure: Past    Smokeless tobacco: Never    Tobacco comments:     Passive exposure in childhood and adult homes and some work places   Vaping Use    Vaping status: Never Used   Substance and Sexual Activity    Alcohol use: No     Alcohol/week: 0.0 standard drinks of alcohol    Drug use: No    Sexual activity: Not Currently     Partners: Male   Other Topics Concern    Parent/sibling w/ CABG, MI or angioplasty before 65F 55M? Not Asked   Social History Narrative     four times and now . She is unemployed.  Lives with her oldest son(he lives with her).  4 sons, 1 son lives with her others are all in the area.     Social Drivers of Health     Financial Resource Strain: Low Risk  (2/26/2025)    Financial Resource Strain     Within the past 12 months, have you or your family members you live with been unable to get utilities (heat, electricity) when it was really needed?: No   Food Insecurity: Low Risk  (2/26/2025)    Food Insecurity     Within the past 12 months, did you worry that your food would run out before you got money to buy more?: No     Within the past 12 months, did the food you bought just not last and you didn t have money to get more?: No   Transportation Needs: Low Risk  (2/26/2025)    Transportation Needs     Within the past 12 months, has lack of transportation kept you from medical appointments, getting your medicines, non-medical meetings or appointments, work, or from getting things that you need?: No   Physical Activity: Not on file   Stress: Not on file   Social Connections: Not on file   Interpersonal Safety: Low Risk  (2/26/2025)    Interpersonal Safety     Do you feel physically and emotionally safe where you  currently live?: Yes     Within the past 12 months, have you been hit, slapped, kicked or otherwise physically hurt by someone?: No     Within the past 12 months, have you been humiliated or emotionally abused in other ways by your partner or ex-partner?: No   Recent Concern: Interpersonal Safety - High Risk (2/13/2025)    Interpersonal Safety     Do you feel physically and emotionally safe where you currently live?: No     Within the past 12 months, have you been hit, slapped, kicked or otherwise physically hurt by someone?: No     Within the past 12 months, have you been humiliated or emotionally abused in other ways by your partner or ex-partner?: No   Housing Stability: Low Risk  (2/26/2025)    Housing Stability     Do you have housing? : Yes     Are you worried about losing your housing?: No       Family History   Family History   Problem Relation Age of Onset    Arthritis Mother         Arthritis,Rheumatoid    Cancer Mother         Cancer, lung and uterine cancer    Heart Disease Father         Heart Disease,CAD, CABG, peripheral vascular dise    Thyroid Disease Father         Thyroid Disease, hyperlipidemia    Other - See Comments Father         djd    Asthma Brother         Asthma    Asthma Sister         Asthma    Other - See Comments Sister         Psychiatric illness,Anxiety    Other - See Comments Son         x2 back surgeries    Breast Cancer No family hx of         Cancer-breast       Review of Systems   A 14-point ROS is negative unless otherwise noted above in the HPI.    Physical Exam   Vital Signs with Ranges  Temp:  [97.9  F (36.6  C)-99.2  F (37.3  C)] 99.2  F (37.3  C)  Pulse:  [89-94] 89  Resp:  [16-20] 16  BP: (110-121)/(71-72) 121/72  SpO2:  [93 %-96 %] 93 %  176 lbs 6.4 oz    Constitutional: Pleasant and cooperative female. Awake, alert, NAD.  HEENT: NC/AT, EOMI, sclera clear, conjunctiva normal, OP with MMM  Respiratory: No increased work of breathing, CTAB, no crackles or  wheezing.  Cardiovascular: RRR, no murmur noted. No peripheral edema.  GI: Normal bowel sounds, soft, non-distended and non-tender.  Skin: Warm, dry, well-perfused. No bruising, bleeding, rashes, or lesions on limited exam.  Neurologic: A&O. Answers questions appropriately. Moves all extremities spontaneously.  Psych: Calm, appropriate affect    Recent Labs  CBC   Recent Labs   Lab 02/26/25  1330 02/25/25  1259   WBC 3.2* 2.7*   RBC 2.62* 2.56*   HGB 9.9* 9.7*   HCT 28.1* 28.2*   * 110*   MCH 37.8* 37.9*   MCHC 35.2 34.4   RDW 16.9* 17.2*   * 140*       CMP   Recent Labs   Lab 02/26/25  1330   *   POTASSIUM 4.2   CHLORIDE 98   CO2 24   ANIONGAP 12   *   BUN 11.2   CR 0.77   GFRESTIMATED 89   TOBIN 9.0   MAG 2.2   PHOS 3.2   PROTTOTAL 7.4   ALBUMIN 4.0   BILITOTAL 0.9   ALKPHOS 175*   AST 27   ALT 17       LFTs:   Recent Labs   Lab 02/26/25  1330   PROTTOTAL 7.4   ALBUMIN 4.0   BILITOTAL 0.9   ALKPHOS 175*   AST 27   ALT 17       Coagulation Studies:   Recent Labs   Lab 02/26/25  1330   INR 1.10   PTT 41*

## 2025-02-26 NOTE — LETTER
Transition Communication Hand-off for Care Transitions to Next Level of Care Provider    Name: Aleajndra Villegas  : 1965  MRN #: [id number]  Primary Care Provider: Physician No Ref-Primary     Primary Clinic: No address on file     Reason for Hospitalization:  Leukopenia [D72.819]  Anemia [D64.9]  Admit Date/Time: 2025 12:52 PM  Discharge Date: 4/3  Payor Source: Payor: MEDICARE / Plan: MEDICARE / Product Type: Medicare /          Reason for Communication Hand-off Referral:   Continuity of care    Discharge Plan:  Discharging home [facility] 3/6 follow [facility] ramp up. Scheduled at [facility] clinic for Monday 3/7 for labs, transfusions and neulasta.      Concern for non-adherence with plan of care:   No  Discharge Needs Assessment:  Needs      Flowsheet Row Most Recent Value   Equipment Currently Used at Home none          Follow-up plan:    Future Appointments   Date Time Provider Department Center   2025 10:20 AM [facility] LAB [facility] [place]   2025 10:30 AM [facility] INFUSION CHAIR 3 [facility] [place]   4/10/2025 10:10 AM [facility] LAB [facility] [place]   4/10/2025 10:30 AM [facility] INFUSION CHAIR 6 [facility] [place]   2025  9:30 AM Samina aHrris MD ONC [place]   2025  8:10 AM [facility] [place]   2025  8:30 AM [facility] INFUSION CHAIR 5 [facility] [place]   2025  9:10 AM [facility] [place]   2025  9:30 AM [facility] INFUSION M410 (PROCEDURE) [facility] [place]       Any outstanding tests or procedures:        Referrals       Future Labs/Procedures    Adult Oncology/Hematology  Referral     Process Instructions:    Consider Adult E-Consult to Hematology for the following conditions: anticoagulation established condition, isolated anemia, MGUS or elevated light chains, thrombocytopenia.   E-Consult Cost: </=$10.    Comments:    Please be aware that coverage of these services is subject to the terms and limitations of your health insurance plan.  Call member services at  your health plan with any benefit or coverage questions.  [facility] will call you to coordinate your care as prescribed by the provider.  If you don t hear from a representative within 2 business days, please call [phone]                [facility] Recommendations:      Jyoti Patten RN    AVS/Discharge Summary is the source of truth; this is a helpful guide for improved communication of patient story

## 2025-02-26 NOTE — PLAN OF CARE
"Goal Outcome Evaluation:    Plan of Care Reviewed With: patient    Overall Patient Progress: no changeOverall Patient Progress: no change    Outcome Evaluation: Awaiting bmbx pathology to guide treatment plan    [id number]    /72 (BP Location: Right arm)   Pulse 89   Temp 99.2  F (37.3  C) (Oral)   Resp 16   Ht 1.626 m (5' 4\")   Wt 80 kg (176 lb 6.4 oz)   LMP 01/01/2007 (Approximate)   SpO2 93%   BMI 30.28 kg/m      Reason for admission: Admitted to [facility] 2/26/25 for further workup and management of cytopenias.   Activity: UAL  Pain: Reporting 6/10 bilateral hip pain. Given PRN Tylenol with moderate effect.   Neuro: AxOx4. Neuros intact.   Cardiac: WDL  Respiratory: SALCEDO. Non productive intermittent cough. Influenza A+.   GI/: Voiding spontaneously with adequate UOP. Awaiting collection of UA/UC. LBM PTA 2/25.   Diet: Regular  Lines: PIV intact, SL. Site WDL.   Wounds: No noted deficits. 2 nurse skin exam completed on admission.   Labs/imaging: Reviewed. See chart. Influenza A+.     Plan: Awaiting final pathology to guide treatment options.      Continue to monitor and follow POC    "

## 2025-02-26 NOTE — CONSULTS
Discharge Pharmacy Test Claim    Patient's [employer] Medicare Part D plan covers voriconazole with an expected monthly copay of $1.60. Patient's plan requires a prior authorization for posaconazole. Liaison will initiate PA process.    Test Claim Copay   Voriconazole 1.60   Posaconazole PA required     Addendum  2/27/25  9:07am:  Prior authorization for posaconazole has been approved by patient's plan. Expected monthly copay is $1.60.    Test Claim Copay   Posaconazole 1.60       Margarita Alvarado  [facility] Liaison (A - L)  Phone: [phone] Fax: [phone]  Available on Teams & Vocera  Disclaimer: Pharmacy test claims are extimates and may not reflect final costs. Suggested alternatives aim to be cost-effective but may not be therapeutically equivalent as this consult is informational and does not constitute medical advice. Clinical decisions should be made by qualified healthcare providers.

## 2025-02-27 VITALS
OXYGEN SATURATION: 97 % | BODY MASS INDEX: 30.39 KG/M2 | HEIGHT: 64 IN | SYSTOLIC BLOOD PRESSURE: 110 MMHG | RESPIRATION RATE: 16 BRPM | HEART RATE: 88 BPM | TEMPERATURE: 98.2 F | WEIGHT: 178 LBS | DIASTOLIC BLOOD PRESSURE: 70 MMHG

## 2025-02-27 LAB
ALBUMIN SERPL BCG-MCNC: 3.8 G/DL (ref 3.5–5.2)
ALP SERPL-CCNC: 159 U/L (ref 40–150)
ALT SERPL W P-5'-P-CCNC: 16 U/L (ref 0–50)
ANION GAP SERPL CALCULATED.3IONS-SCNC: 12 MMOL/L (ref 7–15)
APTT PPP: 33 SECONDS (ref 22–38)
AST SERPL W P-5'-P-CCNC: 25 U/L (ref 0–45)
ATRIAL RATE - MUSE: 94 BPM
BACTERIA BLD CULT: NORMAL
BACTERIA BLD CULT: NORMAL
BASOPHILS # BLD MANUAL: 0 10E3/UL (ref 0–0.2)
BASOPHILS # BLD MANUAL: 0 10E3/UL (ref 0–0.2)
BASOPHILS NFR BLD MANUAL: 0 %
BASOPHILS NFR BLD MANUAL: 0 %
BILIRUB SERPL-MCNC: 0.7 MG/DL
BLASTS # BLD MANUAL: 0.1 10E3/UL
BLASTS NFR BLD MANUAL: 2 %
BUN SERPL-MCNC: 10.4 MG/DL (ref 6–20)
CALCIUM SERPL-MCNC: 8.9 MG/DL (ref 8.8–10.4)
CHLORIDE SERPL-SCNC: 100 MMOL/L (ref 98–107)
CMV IGG SERPL IA-ACNC: 2.8 U/ML
CMV IGG SERPL IA-ACNC: ABNORMAL
CREAT SERPL-MCNC: 0.8 MG/DL (ref 0.51–0.95)
DACRYOCYTES BLD QL SMEAR: SLIGHT
DIASTOLIC BLOOD PRESSURE - MUSE: NORMAL MMHG
EBV VCA IGG SER IA-ACNC: 225 U/ML
EBV VCA IGG SER IA-ACNC: POSITIVE
EGFRCR SERPLBLD CKD-EPI 2021: 85 ML/MIN/1.73M2
EOSINOPHIL # BLD MANUAL: 0 10E3/UL (ref 0–0.7)
EOSINOPHIL # BLD MANUAL: 0 10E3/UL (ref 0–0.7)
EOSINOPHIL NFR BLD MANUAL: 0 %
EOSINOPHIL NFR BLD MANUAL: 1 %
ERYTHROCYTE [DISTWIDTH] IN BLOOD BY AUTOMATED COUNT: 16.8 % (ref 10–15)
FIBRINOGEN PPP-MCNC: 661 MG/DL (ref 170–510)
GLUCOSE SERPL-MCNC: 138 MG/DL (ref 70–99)
HCO3 SERPL-SCNC: 20 MMOL/L (ref 22–29)
HCT VFR BLD AUTO: 27.4 % (ref 35–47)
HGB BLD-MCNC: 9.5 G/DL (ref 11.7–15.7)
HSV1 IGG SERPL QL IA: 14.6 INDEX
HSV1 IGG SERPL QL IA: ABNORMAL
HSV2 IGG SERPL QL IA: 3.48 INDEX
HSV2 IGG SERPL QL IA: ABNORMAL
INR PPP: 1.16 (ref 0.85–1.15)
INTERPRETATION ECG - MUSE: NORMAL
LDH SERPL L TO P-CCNC: 345 U/L (ref 0–250)
LYMPHOCYTES # BLD MANUAL: 2 10E3/UL (ref 0.8–5.3)
LYMPHOCYTES # BLD MANUAL: 2.6 10E3/UL (ref 0.8–5.3)
LYMPHOCYTES NFR BLD MANUAL: 58 %
LYMPHOCYTES NFR BLD MANUAL: 64 %
MAGNESIUM SERPL-MCNC: 2.2 MG/DL (ref 1.7–2.3)
MCH RBC QN AUTO: 38.5 PG (ref 26.5–33)
MCHC RBC AUTO-ENTMCNC: 34.7 G/DL (ref 31.5–36.5)
MCV RBC AUTO: 111 FL (ref 78–100)
MONOCYTES # BLD MANUAL: 0.5 10E3/UL (ref 0–1.3)
MONOCYTES # BLD MANUAL: 0.9 10E3/UL (ref 0–1.3)
MONOCYTES NFR BLD MANUAL: 15 %
MONOCYTES NFR BLD MANUAL: 20 %
NEUTROPHILS # BLD MANUAL: 0.5 10E3/UL (ref 1.6–8.3)
NEUTROPHILS # BLD MANUAL: 0.9 10E3/UL (ref 1.6–8.3)
NEUTROPHILS NFR BLD MANUAL: 17 %
NEUTROPHILS NFR BLD MANUAL: 20 %
NRBC # BLD AUTO: 0.1 10E3/UL
NRBC # BLD AUTO: 0.2 10E3/UL
NRBC BLD MANUAL-RTO: 3 %
NRBC BLD MANUAL-RTO: 5 %
OTHER CELLS # BLD MANUAL: 0.1 10E3/UL
OTHER CELLS NFR BLD MANUAL: 3 %
P AXIS - MUSE: 74 DEGREES
PATH REV: ABNORMAL
PHOSPHATE SERPL-MCNC: 2.9 MG/DL (ref 2.5–4.5)
PLAT MORPH BLD: ABNORMAL
PLAT MORPH BLD: ABNORMAL
PLATELET # BLD AUTO: 133 10E3/UL (ref 150–450)
POLYCHROMASIA BLD QL SMEAR: SLIGHT
POTASSIUM SERPL-SCNC: 4.1 MMOL/L (ref 3.4–5.3)
PR INTERVAL - MUSE: 126 MS
PROT SERPL-MCNC: 7.4 G/DL (ref 6.4–8.3)
QRS DURATION - MUSE: 76 MS
QT - MUSE: 330 MS
QTC - MUSE: 412 MS
R AXIS - MUSE: 22 DEGREES
RBC # BLD AUTO: 2.47 10E6/UL (ref 3.8–5.2)
RBC MORPH BLD: ABNORMAL
RBC MORPH BLD: ABNORMAL
SODIUM SERPL-SCNC: 132 MMOL/L (ref 135–145)
SYSTOLIC BLOOD PRESSURE - MUSE: NORMAL MMHG
T AXIS - MUSE: 68 DEGREES
URATE SERPL-MCNC: 3.5 MG/DL (ref 2.4–5.7)
VARIANT LYMPHS BLD QL SMEAR: PRESENT
VARIANT LYMPHS BLD QL SMEAR: PRESENT
VENTRICULAR RATE- MUSE: 94 BPM
WBC # BLD AUTO: 4.5 10E3/UL (ref 4–11)

## 2025-02-27 PROCEDURE — 99233 SBSQ HOSP IP/OBS HIGH 50: CPT | Mod: 24 | Performed by: STUDENT IN AN ORGANIZED HEALTH CARE EDUCATION/TRAINING PROGRAM

## 2025-02-27 PROCEDURE — 85610 PROTHROMBIN TIME: CPT

## 2025-02-27 PROCEDURE — 84550 ASSAY OF BLOOD/URIC ACID: CPT

## 2025-02-27 PROCEDURE — 36415 COLL VENOUS BLD VENIPUNCTURE: CPT

## 2025-02-27 PROCEDURE — 999N000111 HC STATISTIC OT IP EVAL DEFER

## 2025-02-27 PROCEDURE — 85730 THROMBOPLASTIN TIME PARTIAL: CPT

## 2025-02-27 PROCEDURE — 250N000011 HC RX IP 250 OP 636

## 2025-02-27 PROCEDURE — 85384 FIBRINOGEN ACTIVITY: CPT

## 2025-02-27 PROCEDURE — 85041 AUTOMATED RBC COUNT: CPT

## 2025-02-27 PROCEDURE — 250N000013 HC RX MED GY IP 250 OP 250 PS 637

## 2025-02-27 PROCEDURE — 83735 ASSAY OF MAGNESIUM: CPT

## 2025-02-27 PROCEDURE — 82040 ASSAY OF SERUM ALBUMIN: CPT

## 2025-02-27 PROCEDURE — 85018 HEMOGLOBIN: CPT

## 2025-02-27 PROCEDURE — 99418 PROLNG IP/OBS E/M EA 15 MIN: CPT | Performed by: STUDENT IN AN ORGANIZED HEALTH CARE EDUCATION/TRAINING PROGRAM

## 2025-02-27 PROCEDURE — 93005 ELECTROCARDIOGRAM TRACING: CPT

## 2025-02-27 PROCEDURE — 258N000003 HC RX IP 258 OP 636

## 2025-02-27 PROCEDURE — 87040 BLOOD CULTURE FOR BACTERIA: CPT

## 2025-02-27 PROCEDURE — 85007 BL SMEAR W/DIFF WBC COUNT: CPT

## 2025-02-27 PROCEDURE — 84100 ASSAY OF PHOSPHORUS: CPT

## 2025-02-27 PROCEDURE — 999N000147 HC STATISTIC PT IP EVAL DEFER

## 2025-02-27 PROCEDURE — 82247 BILIRUBIN TOTAL: CPT

## 2025-02-27 PROCEDURE — 83615 LACTATE (LD) (LDH) ENZYME: CPT

## 2025-02-27 PROCEDURE — 120N000002 HC R&B MED SURG/OB UMMC

## 2025-02-27 PROCEDURE — 93010 ELECTROCARDIOGRAM REPORT: CPT | Performed by: INTERNAL MEDICINE

## 2025-02-27 RX ORDER — ACETAMINOPHEN 500 MG
1000 TABLET ORAL 3 TIMES DAILY
COMMUNITY

## 2025-02-27 RX ORDER — MULTIVITAMIN WITH IRON
500 TABLET ORAL DAILY
COMMUNITY

## 2025-02-27 RX ADMIN — OSELTAMIVIR PHOSPHATE 75 MG: 75 CAPSULE ORAL at 19:53

## 2025-02-27 RX ADMIN — GABAPENTIN 400 MG: 300 CAPSULE ORAL at 19:53

## 2025-02-27 RX ADMIN — CYCLOBENZAPRINE HYDROCHLORIDE 10 MG: 5 TABLET, FILM COATED ORAL at 15:42

## 2025-02-27 RX ADMIN — FAMOTIDINE 10 MG: 10 TABLET ORAL at 07:58

## 2025-02-27 RX ADMIN — MICAFUNGIN SODIUM 50 MG: 50 INJECTION, POWDER, LYOPHILIZED, FOR SOLUTION INTRAVENOUS at 19:54

## 2025-02-27 RX ADMIN — ACYCLOVIR 400 MG: 400 TABLET ORAL at 19:53

## 2025-02-27 RX ADMIN — ACETAMINOPHEN 650 MG: 325 TABLET, FILM COATED ORAL at 19:53

## 2025-02-27 RX ADMIN — FLUTICASONE FUROATE AND VILANTEROL TRIFENATATE 1 PUFF: 100; 25 POWDER RESPIRATORY (INHALATION) at 09:10

## 2025-02-27 RX ADMIN — ACYCLOVIR 400 MG: 400 TABLET ORAL at 07:59

## 2025-02-27 RX ADMIN — CEFEPIME HYDROCHLORIDE 2 G: 2 INJECTION, POWDER, FOR SOLUTION INTRAVENOUS at 04:27

## 2025-02-27 RX ADMIN — OSELTAMIVIR PHOSPHATE 75 MG: 75 CAPSULE ORAL at 07:59

## 2025-02-27 RX ADMIN — FAMOTIDINE 10 MG: 10 TABLET ORAL at 19:53

## 2025-02-27 RX ADMIN — ALLOPURINOL 300 MG: 300 TABLET ORAL at 07:58

## 2025-02-27 RX ADMIN — Medication 1000 UNITS: at 07:58

## 2025-02-27 RX ADMIN — ACETAMINOPHEN 650 MG: 325 TABLET, FILM COATED ORAL at 13:49

## 2025-02-27 RX ADMIN — CEFEPIME HYDROCHLORIDE 2 G: 2 INJECTION, POWDER, FOR SOLUTION INTRAVENOUS at 13:49

## 2025-02-27 RX ADMIN — CEFEPIME HYDROCHLORIDE 2 G: 2 INJECTION, POWDER, FOR SOLUTION INTRAVENOUS at 21:53

## 2025-02-27 RX ADMIN — PAROXETINE HYDROCHLORIDE 20 MG: 20 TABLET, FILM COATED ORAL at 07:59

## 2025-02-27 RX ADMIN — ENOXAPARIN SODIUM 40 MG: 40 INJECTION SUBCUTANEOUS at 19:53

## 2025-02-27 RX ADMIN — SUMATRIPTAN SUCCINATE 50 MG: 50 TABLET ORAL at 17:08

## 2025-02-27 RX ADMIN — GABAPENTIN 400 MG: 300 CAPSULE ORAL at 13:49

## 2025-02-27 RX ADMIN — GABAPENTIN 400 MG: 300 CAPSULE ORAL at 07:58

## 2025-02-27 RX ADMIN — ACETAMINOPHEN 650 MG: 325 TABLET, FILM COATED ORAL at 07:57

## 2025-02-27 RX ADMIN — LISINOPRIL 10 MG: 10 TABLET ORAL at 07:59

## 2025-02-27 ASSESSMENT — ACTIVITIES OF DAILY LIVING (ADL)
ADLS_ACUITY_SCORE: 31
ADLS_ACUITY_SCORE: 26
ADLS_ACUITY_SCORE: 31
ADLS_ACUITY_SCORE: 26
ADLS_ACUITY_SCORE: 24
ADLS_ACUITY_SCORE: 26
ADLS_ACUITY_SCORE: 24
ADLS_ACUITY_SCORE: 31
ADLS_ACUITY_SCORE: 24
ADLS_ACUITY_SCORE: 26
ADLS_ACUITY_SCORE: 24
ADLS_ACUITY_SCORE: 26
ADLS_ACUITY_SCORE: 31
ADLS_ACUITY_SCORE: 26
ADLS_ACUITY_SCORE: 31
ADLS_ACUITY_SCORE: 24
ADLS_ACUITY_SCORE: 31
ADLS_ACUITY_SCORE: 31
ADLS_ACUITY_SCORE: 26
ADLS_ACUITY_SCORE: 24
ADLS_ACUITY_SCORE: 31
ADLS_ACUITY_SCORE: 24
ADLS_ACUITY_SCORE: 24

## 2025-02-27 NOTE — PHARMACY-ADMISSION MEDICATION HISTORY
Pharmacy Intern Admission Medication History    Admission medication history is complete. The information provided in this note is only as accurate as the sources available at the time of the update.    Information Source(s): Patient and CareEverywhere/SureScripts via in-person    Changes made to PTA medication list:  Added:   Omeprazole DR 20 mg orally once daily  Vitamin B-12 500 mg orally once daily   Acetaminophen 1000 mg orally three times daily for pain  Deleted:   Hydrocodone-APAP 5-325 mg: Patient has not been taking for over a month, course of treatment completed  Vitamin D: Patient reported not taking  Changed:   Aspirin EC 81 mg orally twice daily to once daily  Fluticasone 50 mcg/act 2 spays in both nostrils twice daily to once daily:   Gabapentin 800 mg orally twice daily to 400 mg three times daily     Allergies reviewed with patient and updates made in EHR: yes    Medication History Completed By: Tara Armendariz 2/27/2025 11:35 AM    PTA Med List   Medication Sig Last Dose/Taking    acetaminophen (TYLENOL) 500 MG tablet Take 1,000 mg by mouth 3 times daily. 2/26/2025    albuterol (PROAIR HFA/PROVENTIL HFA/VENTOLIN HFA) 108 (90 Base) MCG/ACT inhaler Inhale 1-2 puffs into the lungs every 4 hours as needed for shortness of breath, wheezing or cough Past Month    aspirin (ASPIRIN LOW DOSE) 81 MG EC tablet TAKE 1 TABLET (81 MG) BY MOUTH 2 TIMES DAILY. (Patient taking differently: Take 81 mg by mouth daily.) Past Week    atorvastatin (LIPITOR) 40 MG tablet TAKE 1 TABLET (40 MG) BY MOUTH AT BEDTIME 2/26/2025 Evening    budesonide-formoterol (SYMBICORT) 80-4.5 MCG/ACT Inhaler Inhale 2 puffs into the lungs 2 times daily - Rinse mouth well after use to prevent Thrush Past Week    cyanocobalamin (VITAMIN B-12) 500 MCG tablet Take 500 mcg by mouth daily. Past Week    cyclobenzaprine (FLEXERIL) 10 MG tablet Take 1 tablet (10 mg) by mouth at bedtime. (Patient taking differently: Take 10 mg by mouth nightly as needed.) Past  Month    fluticasone (FLONASE) 50 MCG/ACT nasal spray Spray 2 sprays into both nostrils 2 times daily. (Patient taking differently: Spray 2 sprays into both nostrils daily.) 2/26/2025    gabapentin (NEURONTIN) 400 MG capsule Take 2 capsules (800 mg) by mouth 2 times daily (Patient taking differently: Take 400 mg by mouth 3 times daily.) 2/26/2025    ipratropium - albuterol 0.5 mg/2.5 mg/3 mL (DUONEB) 0.5-2.5 (3) MG/3ML neb solution Take 1 vial (3 mLs) by nebulization every 4 hours as needed for shortness of breath, wheezing or cough Past Month    lisinopril (ZESTRIL) 10 MG tablet TAKE 1 TABLET (10 MG) BY MOUTH DAILY 2/26/2025    omeprazole 20 MG tablet Take 20 mg by mouth daily. 2/26/2025 Morning    ondansetron (ZOFRAN ODT) 4 MG ODT tab Take 1 tablet (4 mg) by mouth every 8 hours as needed for nausea or vomiting. 2/26/2025    order for DME Equipment being ordered: home neb set up with mask and tubing Taking    PARoxetine (PAXIL) 20 MG tablet TAKE 1 TABLET (20 MG) BY MOUTH EVERY MORNING 2/26/2025 Morning    SUMAtriptan (IMITREX) 50 MG tablet Take 1 tablet (50 mg) by mouth at onset of headache for migraine May repeat in 2 hours. Max 4 tablets/24 hours. Past Month

## 2025-02-27 NOTE — PLAN OF CARE
Physical Therapy: Orders received. Chart reviewed and discussed with care team.? Physical Therapy not indicated due to patient is demonstrating functional mobility near baseline with no acute PT needs or concerns for mobilizing per discussion with PT. Pt ambulated from patient room to elevators and has been doing so frequently throughout the day. Encouraged frequent ambulation and that PT can be re-consulted if any difficulties with mobility while undergoing chemotherapy. Patient in agreement.? Page sent to ordering provider regarding therapies signing off. Defer discharge recommendations to medical team.? Will complete orders.  Please re-consult PT if any new needs arise.

## 2025-02-27 NOTE — PLAN OF CARE
Goal Outcome Evaluation:      Plan of Care Reviewed With: patient    Overall Patient Progress: no changeOverall Patient Progress: no change    Outcome Evaluation: waiting for pathology to determine treatment plan    AO x4. Room air. Low grade fever last night, tylenol given x1. Patient denies nausea, vomiting, shortness of breath. Complains of slight cough. PIV is saline locked. Stand by assist. Regular diet. Waiting for pathology results to determine plan of care.

## 2025-02-27 NOTE — PROGRESS NOTES
Nursing Focus: Admission    D: Patient admitted/transferred from home via self for cytopenia workup.      I: Upon arrival to the unit patient was oriented to room, unit, and call light. Patient s height, weight, and vital signs were obtained. Allergies reviewed and allergy band applied. MD notified of patient s arrival on the unit. Adult AVS completed. Head to toe assessment completed. Education assessment completed. Care plan initiated.     A: Vital signs stable upon admission. Two RN skin assessment completed: Yes. Second RN was Shama Platt significant skin findings. [facility] Nurse Consult Ordered: No. Bed Algorithm can be found in PCS flow sheets (Support Surface Algorithm) and on IP [facility] NURSE RESOURCE TAB, was this used during this assessment? Yes.     P: Continue to monitor and intervene as needed. Continue with plan of care. Notify MD with any concerns or changes in patient status.

## 2025-02-27 NOTE — PLAN OF CARE
Goal Outcome Evaluation:      Plan of Care Reviewed With: patient    Overall Patient Progress: no change    [id number]: Patient AVSS on room air. A&O x 4. Pt with headache pain, treated with tylenol x 2, flexeril x 1, and sumatriptan x 1. No nausea/vomiting. Independent. Calls appropriately. Continue with plan of care.

## 2025-02-27 NOTE — PROGRESS NOTES
Prior Authorization Approval    Medication: POSACONAZOLE 100 MG PO [facility], [place]  Authorization Effective Date: 1/1/2025  Authorization Expiration Date: 8/25/2025  Approved Dose/Quantity: 300mg  /  90 tabs  Reference #: [id number] , [place] Case ID #: [id number]   Insurance Company: [employer] - Phone [phone] Fax [phone]  Expected CoPay: $ 1.60  Which Pharm[facility] filling the [facility] - [place], [place] - [address]  Pharmacy Notified: Yes            Margarita Alvarado  [facility] Pharmacy Liaison (A - L)  Phone [phone]  Fax [phone]  Available on Teams & Radha       Patient/Spouse no s/s allergic reaction prior to d/c/Patient/Spouse

## 2025-02-27 NOTE — PLAN OF CARE
5A OT CX and Defer: Evaluation orders received, chart review complete and discussed with PT provider/care team.? Per discussion with PT provider, OT not indicated as patient is demonstrating functional mobility and ADL tasks near baseline. Patient ambulating in-hallway multiple times/daily IND, no AD. Page sent to ordering provider regarding therapies signing off. Defer discharge recommendations to medical team.?No further acute OT needs identified, will complete orders.

## 2025-02-27 NOTE — PROGRESS NOTES
[facility]    Hematology / Oncology Progress Note  Hospital Day # 1  Date of Service (when I saw the patient): 02/27/2025     Assessment & Plan   Alejandra Villegas is a 59 year old female who presents with PMHx of COPD, migraines, rheumatoid arthritis, and anxiety. She was being followed at outside hematology/oncology clinic for cytopenias. Completed Bmbx at OSH 2/13/24 showing hypercellular marrow w/ 4% blasts and NPM1, IDH2, and NRAS mutations. She was admitted to [facility] 2/26/25 for further workup and management. Course has been complicated by neutropenic fevers, influenza A.    Today:  - Slides sent to [facility] x2/26 for review for diagnosis confirmation. Further treatment decisions pending based on results and further d/w patient/family.   - Febrile to 100.7 PM 2/26. (+) Influenza A. Started cefepime and Tamiflu 75 mg BID x 5 days (2/26-3/3). COVID/RVP otherwise negative. CXR w/ streaky opacities, no consolidations. UA negative. BCx2 obtained, follow.   - Prechemo echo w/ EF 60-65%, mild known aortic stenosis. EKG NSR.   - Continue with best supportive cares.     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 Hgb 12. Was referred to local hematology/oncology clinic for further evaluation and seen by Dr. Harris. Bmbx 2/10/25 done at OSH showed hypercellular marrow w/ trilineage hematopoiesis w/ dyspoiesis with mildly elevated blasts of 4% on morphology. NGS w/ NPM1, IDH2, and NRAS mutations. She was admitted to [facility] 2/26/25 for further workup and management.   - Ordered baseline echo w/ EF 60-65%, mild known aortic stenosis. EKG NSR.   - Ordered baseline viral serologies on admission: HepB/HepC, HIV negative, HSV 1/2, CMV, EBV, IgG positive.   - Sent HLA Typing BMT complete on admission. Will require inpatient BMT NT  consult at some point during hospital stay; message sent to BMT x2/27.   - Requested and confirmed request of OSH BMBx slides and notified [facility] special heme lab.   - Further treatment decisions forthcoming on the basis of final BMBx results and discussions with patient/family; however, anticipate starting induction chemotherapy within the next 1-2 days. Will defer PICC placement at this time.      # Anemia  # Leukopenia  Secondary to underlying hematologic malignancy.  - Follow daily CBC with differential  - Transfuse to keep Hgb >7, plt >10K  - Blood consent obtained 2/26/25 on admission and placed in patients paper chart.      # Risk for TLS  Secondary to hematologic malignancy, no evidence of TLS on admission.   - Started allopurinol 300 mg daily on admission  - Trend TLS labs daily for now; can increase frequency PRN with treatment initiation  - Management of TLS:  If Cr increasing, start gentle IVF  If uric acid >8, give rasburicase 6 mg x1 dose  If phos >5, start Phoslo 667 mg TID  Correction of additional electrolyte abnormalities PRN per usual means     # Risk for DIC  Secondary to underlying hematologic malignancy.  - Follow daily coags  - Transfuse cryo to keep fibrinogen >100, FFP to keep INR <1.8     ID  #ID prophylaxis  Acyclovir 400 mg BID  Levaquin 250 mg daily, held with IV abx as below  Micafungin 50 mg daily  - PLC placed on admission for posa/vori coverage; vori w/ $1.60 copay, posa requiring PA.      # Neutropenic fever  # (+) Influenza A  # Rhinitis  On admission, patient noted subjective fever with chills and rhinitis 2/25 PM prior to admission. She denies other infectious symptoms such as headaches, new/worsening shortness of breath or cough beyond baseline w/ COPD hx, diarrhea, abdominal pain/cramping. Afebrile and otherwise HDS on admission; basic ID workup completed w/ RVP/COVID/Influenza ordered.  Febrile to 100.7 PM 2/26. (+) Influenza A. Started cefepime and Tamiflu 75 mg BID x 5 days.  "COVID/RVP otherwise negative. CXR w/ streaky opacities, no consolidations. UA negative. BCx2 obtained, follow.   - Follow BCx2  - C/w Tamiflu 75 mg BID x5 days (2/26-3/3) and cefepime (2/26-X)     GI/  #Nausea/Vomiting  Patient noted ongoing nausea w/ occasional vomiting starting Dec 2024. Has been managed with prn zofran.   - PRN zofran and compazine     #Urinary frequency  #Dysuria  On admission, patient noted longstanding history of urinary frequency though new mild dysuria. Denies hematuria, bladder tenderness of CVA tenderness  - UA negative, UC pending     # GERD  Noted h/o on admission, noted symptoms increasing leading up to admission  - Start famotidine 10 mg BID     PULM  #COPD  Longstanding history of COPD. On admission patient reports symptoms are manageable and no recent exacerbations.   - Continue PTA Breo Ellipta inhaler and prn DuoNebs      CV  #Essential hypertension  Continue PTA lisinopril     #Mixed hyperlipidemia  Lipid panel have remained at goal, 1/14/25 panel w/ cholesterol 163, , LDL 92, HDL 45. - Held PTA atorvastatin on admission.     # H/o aortic stenosis  Patient noted on admission longstanding history of aortic stenosis. Prechemo echo w/ EF 60-65% and mild aortic stenosis and insufficiency. No previous echo to compare.      RENAL  # H/o CKD, stage 2  Baseline Cr ~ 0.7. On admission Cr 0.77.  - Continue to trend on daily labs and encourage PO hydration      NEURO  #Degenerative disc disease, L5-S1  Continue PTA gabapentin     #Chronic migraine w/o aura  Patient noted has been chronic since she was a child. Triggered by \"cracking her neck the wrong way\" and managed with prn regimen below along with resting in a dimly lit room.   - Continue PTA prn sumatriptan and prn cyclobenzaprine     ENDO  #Prediabetes  Last A1c 6.0 x12/9/24. Has been managing with lifestyle modifications.      MISC  #Rheumatoid arthritis   Patient reported trying treatment though was unable to tolerate well. " Though notes manages w/ tylenol prn. RF completed at OSH 2/10 > 650. JI negative.       # BROOKS  # MDD  # Coping with new diagnosis  Patient reports good control of anxiety/depressive symptoms prior to admission. Did note feeling overwhelmed by new diagnosis. On admission discussed inpatient services such as health psychology or cordelia, she respectfully declined though will consider.   - Readdress health psych consult as indicated  - Continue PTA paroxetine      #Vit D Deficiency  Continue PTA vit D supplement     #Tobacco use - Patient reported starting at age 16 and has averaged 1.5 ppd up until about Jan 2025 where she has decreased use to about 1/2 ppd. Offered NRT on admission that she respectfully declined.   - Readdress NRT as indicated.     Fluids/Electrolytes/Nutrition   - IVF prn   - PRN lyte replacement per standing protocol  - Regular diet as tolerated     Lines: PIV    PPX  VTE: Lovenox, hold for plts < 50k  GI: Famotidine 10 mg BID  Bowels: prn senna and miralax  Activity: as tolerated    Code: DNR/DNI    Medically Ready for Discharge: Anticipated in 5+ Days    Disposition: Admitted for further workup and management. Discharge pending treatment decisions and clinical course.   Follow-up: Will need to request APPT18 closer to discharge date     I spent 60 minutes face-to-face and/or coordinating or discussing care plan. Over 50% of our time on the unit was spent counseling the patient and coordinating care    Patient is seen and examined by Dr. Collins and ORA.  Assessment and plan are discussed and delivered to the patient.    Chelsie Murphy PA-C  Hematology/Oncology  Pager: [phone]    Interval History   Overnight notes reviewed. Febrile to 100.7 and positive for Influenza A.     Alejandra is resting in bed, feeling about the same. Sister is supportive at bedside. We discussed interval labs, ID workup findings and management, and plan of care for today. [facility] slide review remains pending. Will  "continue to provide updates regarding diagnosis, treatment plan, and anticipated length of admission as able. Patient and sister appreciate. She continues to feel fatigued with generalized body aches, chills, rhinitis, and intermittent cough; attributing and consistent with influenza. Nausea is minimal today. Denies  mouth sores, SOB, abdominal pain, diarrhea, constipation, nausea, vomiting, hematuria, numbness, tingling, swelling. Denies further questions or concerns at this time.     Complete and Comprehensive review of systems review and negative other than noted here or in the HPI.     Physical Exam   Temp: 99.9  F (37.7  C) Temp src: Oral BP: (!) 140/76 Pulse: 98   Resp: 16 SpO2: 98 % O2 Device: None (Room air)    Vitals:    02/26/25 1252   Weight: 80 kg (176 lb 6.4 oz)     Vital Signs with Ranges  Temp:  [97.9  F (36.6  C)-100.7  F (38.2  C)] 99.9  F (37.7  C)  Pulse:  [] 98  Resp:  [16-20] 16  BP: (110-140)/(65-78) 140/76  SpO2:  [93 %-98 %] 98 %  I/O last 3 completed shifts:  In: 960 [P.O.:660; I.V.:300]  Out: 1650 [Urine:1650]      Physical Exam:    Blood pressure (!) 140/76, pulse 98, temperature 99.9  F (37.7  C), temperature source Oral, resp. rate 16, height 1.626 m (5' 4\"), weight 80 kg (176 lb 6.4 oz), last menstrual period 01/01/2007, SpO2 98%, not currently breastfeeding.    Constitutional: Awake and conversational. Non-toxic appearing. No acute distress.   HEENT: Normocephalic, atraumatic. Sclerae anicteric. Moist mucus membranes without lesions, abscess, or thrush.    Respiratory: Breathing comfortably on room air with no accessory muscle use. Speaking in full sentences, no evidence of respiratory distress. Lungs CTAB, no wheeze or rales.   Cardiovascular: Regular rate and rhythm. Soft systolic murmur consistent with known aortic stenosis.  Trace peripheral edema.    GI: Abdomen with normoactive bowel sounds, soft, non-distended, and non-tender throughout. No rebound or guarding.   Skin: " Skin is clean, dry, intact. No jaundice or significant rashes appreciated.   Neurologic: Alert and oriented with normal speech. Grossly nonfocal exam. Memory and thought process preserved. Moves all extremities equally.   Neuropsychiatric: Calm, appropriate mood and affect congruent to situation. Good judgment and insight.   Vascular access: PIV. CDI. No evidence of erythema or inflammation.       Medications   Current Facility-Administered Medications   Medication Dose Route Frequency Provider Last Rate Last Admin     Current Facility-Administered Medications   Medication Dose Route Frequency Provider Last Rate Last Admin    acyclovir (ZOVIRAX) tablet 400 mg  400 mg Oral BID Chelsie Murphy PA-C   400 mg at 02/26/25 2042    allopurinol (ZYLOPRIM) tablet 300 mg  300 mg Oral Daily Chelsie Murphy PA-C   300 mg at 02/26/25 1400    ceFEPIme (MAXIPIME) 2 g vial to attach to  mL bag for ADULTS or NS 50 mL bag for PEDS  2 g Intravenous Q8H Melanie Kulkarni MD   2 g at 02/27/25 0427    enoxaparin ANTICOAGULANT (LOVENOX) injection 40 mg  40 mg Subcutaneous Q24H Chelsie Murphy PA-C   40 mg at 02/26/25 2042    famotidine (PEPCID) tablet 10 mg  10 mg Oral BID Chelsie Murphy PA-C   10 mg at 02/26/25 2042    fluticasone-vilanterol (BREO ELLIPTA) 100-25 MCG/ACT inhaler 1 puff  1 puff Inhalation Daily Chelsie Murphy PA-C        gabapentin (NEURONTIN) capsule 400 mg  400 mg Oral TID Chelsie Murphy PA-C   400 mg at 02/26/25 2041    [Held by provider] levofloxacin (LEVAQUIN) tablet 250 mg  250 mg Oral Daily Chelsie Murphy PA-C   250 mg at 02/26/25 1400    lisinopril (ZESTRIL) tablet 10 mg  10 mg Oral Daily Chelsie Murphy PA-C        micafungin (MYCAMINE) 50 mg in sodium chloride 0.9 % 100 mL intermittent infusion  50 mg Intravenous Q24H Chelsie Murphy PA-C 100 mL/hr at 02/26/25 2055 50 mg at 02/26/25 2055    oseltamivir  (TAMIFLU) capsule 75 mg  75 mg Oral BID Melanie Kulkarni MD   75 mg at 02/26/25 2042    PARoxetine (PAXIL) tablet 20 mg  20 mg Oral QAM Chelsie Murphy PA-C        sodium chloride (PF) 0.9% PF flush 3 mL  3 mL Intracatheter Q8H Chelsie Murphy PA-C        Vitamin D3 (CHOLECALCIFEROL) tablet 1,000 Units  1,000 Units Oral Daily Chelsie Murphy PA-C           Data   Results for orders placed or performed during the hospital encounter of 02/26/25 (from the past 24 hours)   CBC with platelets differential    Narrative    The following orders were created for panel order CBC with platelets differential.  Procedure                               Abnormality         Status                     ---------                               -----------         ------                     CBC with platelets and d...[[id number]]  Abnormal            Final result               Manual Differential[[id number]]                                                         Manual Differential[[id number]]          Abnormal            Preliminary result           Please view results for these tests on the individual orders.   Comprehensive metabolic panel   Result Value Ref Range    Sodium 134 (L) 135 - 145 mmol/L    Potassium 4.2 3.4 - 5.3 mmol/L    Carbon Dioxide (CO2) 24 22 - 29 mmol/L    Anion Gap 12 7 - 15 mmol/L    Urea Nitrogen 11.2 6.0 - 20.0 mg/dL    Creatinine 0.77 0.51 - 0.95 mg/dL    GFR Estimate 89 >60 mL/min/1.73m2    Calcium 9.0 8.8 - 10.4 mg/dL    Chloride 98 98 - 107 mmol/L    Glucose 127 (H) 70 - 99 mg/dL    Alkaline Phosphatase 175 (H) 40 - 150 U/L    AST 27 0 - 45 U/L    ALT 17 0 - 50 U/L    Protein Total 7.4 6.4 - 8.3 g/dL    Albumin 4.0 3.5 - 5.2 g/dL    Bilirubin Total 0.9 <=1.2 mg/dL   Magnesium   Result Value Ref Range    Magnesium 2.2 1.7 - 2.3 mg/dL   Phosphorus   Result Value Ref Range    Phosphorus 3.2 2.5 - 4.5 mg/dL   Uric acid   Result Value Ref Range    Uric Acid 4.3 2.4 - 5.7 mg/dL    HLA Complete Typing BMT Recipient    Narrative    The following orders were created for panel order HLA Complete Typing BMT Recipient.  Procedure                               Abnormality         Status                     ---------                               -----------         ------                     HLA Complete Typing BMT ...[[id number]]                      In process                   Please view results for these tests on the individual orders.   HIV Antigen Antibody Combo Cascade   Result Value Ref Range    HIV Antigen Antibody Combo Nonreactive Nonreactive   Hepatitis B Surface Antibody   Result Value Ref Range    Hepatitis B Surface Antibody Nonreactive     Hepatitis B Surface Antibody Instrument Value <3.50 <8.5 m[IU]/mL   Hepatitis B surface antigen   Result Value Ref Range    Hepatitis B Surface Antigen Nonreactive Nonreactive   Hepatitis B core antibody   Result Value Ref Range    Hepatitis B Core Antibody Total Nonreactive Nonreactive   Hepatitis C antibody   Result Value Ref Range    Hepatitis C Antibody Nonreactive Nonreactive   INR   Result Value Ref Range    INR 1.10 0.85 - 1.15   Fibrinogen activity   Result Value Ref Range    Fibrinogen Activity 563 (H) 170 - 510 mg/dL   Partial thromboplastin time   Result Value Ref Range    aPTT 41 (H) 22 - 38 Seconds   CBC with platelets and differential   Result Value Ref Range    WBC Count 3.2 (L) 4.0 - 11.0 10e3/uL    RBC Count 2.62 (L) 3.80 - 5.20 10e6/uL    Hemoglobin 9.9 (L) 11.7 - 15.7 g/dL    Hematocrit 28.1 (L) 35.0 - 47.0 %     (H) 78 - 100 fL    MCH 37.8 (H) 26.5 - 33.0 pg    MCHC 35.2 31.5 - 36.5 g/dL    RDW 16.9 (H) 10.0 - 15.0 %    Platelet Count 139 (L) 150 - 450 10e3/uL   Manual Differential   Result Value Ref Range    % Neutrophils 17 %    % Lymphocytes 64 %    % Monocytes 15 %    % Eosinophils 1 %    % Basophils 0 %    % Other Cells 3 %    Absolute Neutrophils 0.5 (L) 1.6 - 8.3 10e3/uL    Absolute Lymphocytes 2.0 0.8 - 5.3  10e3/uL    Absolute Monocytes 0.5 0.0 - 1.3 10e3/uL    Absolute Eosinophils 0.0 0.0 - 0.7 10e3/uL    Absolute Basophils 0.0 0.0 - 0.2 10e3/uL    Absolute Other Cells 0.1 (H) <=0.0 10e3/uL    NRBCs per 100 WBC 3 %    Absolute NRBCs 0.1 10e3/uL    RBC Morphology Confirmed RBC Indices     Platelet Assessment  Automated Count Confirmed. Platelet morphology is normal.     Automated Count Confirmed. Platelet morphology is normal.    Polychromasia Slight (A) None Seen    Reactive Lymphocytes Present (A) None Seen    Teardrop Cells Slight (A) None Seen    Narrative    Differential done on Albumin treated blood due to Smudge Cells.    Sent for review by Pathologist.  See Pathologist comments after review.       Echocardiogram Complete   Result Value Ref Range    LVEF  60-65%     Narrative    [id number]  [id number]  [id number]  [id number]^BARTOLOME^CHRISTY     [facility],[place]  Echocardiography Laboratory  [address] [place], [place] [address]     Name: MAGGY ARNETT  MRN: [id number]  : 1965  Study Date: 2025 01:39 PM  Age: 59 yrs  Gender: Female  Patient Location: [facility]  Reason For Study: Chemo  Ordering Physician: CHRISTY MANCUSO  Referring Physician: JACKI CABELLO  Performed By: Emy Quigley RDCS     BSA: 1.9 m2  Height: 64 in  Weight: 176 lb  ______________________________________________________________________________  Procedure  Echocardiogram with two-dimensional, color and spectral Doppler.  ______________________________________________________________________________  Interpretation Summary  Global and regional left ventricular function is normal with an EF of 60-65%.  Global peak LV longitudinal strain is averaged at -24.4%. This is within  reported normal limits (normal <-18%).  Right ventricular function, chamber size, wall motion, and thickness are  normal.  Mild aortic stenosis and mild aortic insufficiency are present.  No pericardial effusion  is present.  Previous study not available for comparison.  ______________________________________________________________________________  Left Ventricle  Global and regional left ventricular function is normal with an EF of 60-65%.  Left ventricular size is normal. Left ventricular wall thickness is normal.  Left ventricular diastolic function is normal. Diastolic Doppler findings  (E/E' ratio and/or other parameters) suggest left ventricular filling  pressures are normal. Global peak LV longitudinal strain is averaged at -  24.4%. This is within reported normal limits (normal <-18%).     Right Ventricle  Right ventricular function, chamber size, wall motion, and thickness are  normal.     Atria  Both atria appear normal.     Mitral Valve  The mitral valve is normal.     Aortic Valve  Mild aortic insufficiency is present. Mild aortic stenosis is present. The  peak aortic velocity is 2.9 m/sec. The mean AoV pressure gradient is 18.0  mmHg. The aortic valve area is 2.0 cm^2, by the continuity equation.  Dimensionless velocity index is 0.64.     Tricuspid Valve  The tricuspid valve is normal. Trace tricuspid insufficiency is present. The  right ventricular systolic pressure is approximated at 19.5 mmHg plus the  right atrial pressure. Pulmonary artery systolic pressure is normal.     Pulmonic Valve  The valve leaflets are not well visualized. On Doppler interrogation, there is  no significant stenosis or regurgitation.     Vessels  The aorta root is normal. The thoracic aorta is normal. The pulmonary artery  cannot be assessed. The inferior vena cava was normal in size with preserved  respiratory variability. IVC diameter <2.1 cm collapsing >50% with sniff  suggests a normal RA pressure of 3 mmHg.     Pericardium  No pericardial effusion is present.     Compared to Previous Study  Previous study not available for comparison.  ______________________________________________________________________________  MMode/2D  Measurements & Calculations  IVSd: 0.90 cm  LVIDd: 4.5 cm  LVIDs: 3.3 cm  LVPWd: 0.89 cm  FS: 26.7 %  LV mass(C)d: 133.7 grams  LV mass(C)dI: 72.2 grams/m2  Ao root diam: 2.9 cm  asc Aorta Diam: 3.2 cm  LVOT diam: 2.0 cm  LVOT area: 3.1 cm2  Ao root diam index Ht(cm/m): 1.8  Ao root diam index BSA (cm/m2): 1.6  Asc Ao diam index BSA (cm/m2): 1.7  Asc Ao diam index Ht(cm/m): 2.0  LA Volume (BP): 33.9 ml     LA Volume Index (BP): 18.3 ml/m2  RWT: 0.39     Doppler Measurements & Calculations  MV E max delbert: 87.4 cm/sec  MV A max delbert: 107.0 cm/sec  MV E/A: 0.82  MV dec time: 0.19 sec  Ao V2 max: 285.0 cm/sec  Ao max P.5 mmHg  Ao V2 mean: 188.2 cm/sec  Ao mean P.0 mmHg  Ao V2 VTI: 45.7 cm  MYRTLE(I,D): 2.2 cm2  MYRTLE(V,D): 2.0 cm2  AI P1/2t: 873.9 msec  LV V1 max P.3 mmHg  LV V1 max: 182.0 cm/sec  LV V1 VTI: 32.7 cm  SV(LVOT): 102.7 ml  SI(LVOT): 55.5 ml/m2  TR max delbert: 221.0 cm/sec  TR max P.5 mmHg  AV Delbert Ratio (DI): 0.64  MYRTLE Index (cm2/m2): 1.2     E/E' av.4  Lateral E/e': 7.9  Medial E/e': 11.0  RV S Delbert: 14.1 cm/sec     ______________________________________________________________________________  Report approved by: Manuel Robles MD on 2025 02:54 PM         Lactate Dehydrogenase   Result Value Ref Range    Lactate Dehydrogenase 316 (H) 0 - 250 U/L   Respiratory Panel PCR    Specimen: Nasopharyngeal; Swab   Result Value Ref Range    Adenovirus Not Detected Not Detected    Coronavirus Not Detected Not Detected    Human Metapneumovirus Not Detected Not Detected    Human Rhin/Enterovirus Not Detected Not Detected    Influenza A Detected (A) Not Detected    Influenza A, H1 Not Detected Not Detected    Influenza A 2009 H1N1 Detected (A) Not Detected    Influenza A, H3 Not Detected Not Detected    Influenza B Not Detected Not Detected    Parainfluenza Virus 1 Not Detected Not Detected    Parainfluenza Virus 2 Not Detected Not Detected    Parainfluenza Virus 3 Not Detected Not Detected     Parainfluenza Virus 4 Not Detected Not Detected    Respiratory Syncytial Virus A Not Detected Not Detected    Respiratory Syncytial Virus B Not Detected Not Detected    Chlamydia Pneumoniae Not Detected Not Detected    Mycoplasma Pneumoniae Not Detected Not Detected    Narrative    The [employer] Respiratory Panel is a qualitative nucleic acid, multiplex, in vitro diagnostic test for the simultaneous detection and identification of multiple respiratory viral and bacterial nucleic acids in nasopharyngeal swabs collected in viral transport media from individual exhibiting signs and symptoms of respiratory infection. The assay has received FDA approval for the testing of nasopharyngeal (NP) swabs only. This test is used for clinical purposes and should not be regarded as investigational or for research. This laboratory is certified under the Clinical Laboratory Improvement Amendments of 1988 (CLIA-88) as qualified to perform high complexity clinical laboratory testing.   Influenza A/B, RSV and SARS-CoV2 PCR (COVID-19) Nasopharyngeal    Specimen: Nasopharyngeal; Swab   Result Value Ref Range    Influenza A PCR Positive (A) Negative    Influenza B PCR Negative Negative    RSV PCR Negative Negative    SARS CoV2 PCR Negative Negative    Narrative    Testing was performed using the Xpert Xpress CoV2/Flu/RSV Assay on the [employer] GeneXpert Instrument. This test should be ordered for the detection of SARS-CoV2, influenza, and RSV viruses in individuals with signs and symptoms of respiratory tract infection. This test is for in vitro diagnostic use under the [place] FDA for laboratories certified under CLIA to perform high or moderate complexity testing. This test has been [place] FDA cleared. A negative result does not rule out the presence of PCR inhibitors in the specimen or target RNA in concentration below the limit of detection for the assay. If only one viral target is positive but coinfection with multiple targets is suspected, the  sample should be re-tested with another FDA cleared, approved, or authorized test, if coninfection would change clinical management. This test was validated by the [facility]. These laboratories are certified under the Clinical Laboratory Improvement Amendments of 1988 (CLIA-88) as qualified to perfom high complexity laboratory testing.   UA with Microscopic   Result Value Ref Range    Color Urine Straw Colorless, Straw, Light Yellow, Yellow    Appearance Urine Clear Clear    Glucose Urine Negative Negative mg/dL    Bilirubin Urine Negative Negative    Ketones Urine Negative Negative mg/dL    Specific Gravity Urine 1.007 1.003 - 1.035    Blood Urine Negative Negative    pH Urine 7.0 5.0 - 7.0    Protein Albumin Urine Negative Negative mg/dL    Urobilinogen Urine Normal Normal, 2.0 mg/dL    Nitrite Urine Negative Negative    Leukocyte Esterase Urine Negative Negative    Bacteria Urine Few (A) None Seen /HPF    RBC Urine <1 <=2 /HPF    WBC Urine 4 <=5 /HPF   XR Chest Port 1 View    Narrative    Exam: XR CHEST PORT 1 VIEW  2/26/2025 8:48 PM     History:  Investigating any infectious etiology       Comparison:  1/23/2024    Findings: Single view of the chest.    Trachea is midline. The cardiomediastinal silhouette is stable and  within normal limits. No significant pleural effusion or pneumothorax.  Streaky bibasilar pulmonary opacities are mildly increased. The  visualized upper abdomen is unremarkable. No acute bony abnormality.      Impression    Impression: Streaky bibasilar opacities, likely atelectasis or edema.  No consolidation.    I have personally reviewed the examination and initial interpretation  and I agree with the findings.    MARILYN ADEN MD         SYSTEM ID:  [id number]   MRSA MSSA PCR, Nasal Swab    Specimen: Nares, Bilateral; Swab   Result Value Ref Range    MRSA Target DNA Negative Negative    SA Target DNA Negative     Narrative    [employer]  Xpert SA Nasal Complete assay  performed in the [employer]  [facility]x System is a qualitative in vitro diagnostic test designed for rapid detection of Staphylococcus aureus (SA) and methicillin-resistant Staphylococcus aureus (MRSA) from nasal swabs in patients at risk for nasal colonization. The test utilizes automated real-time polymerase chain reaction (PCR) to detect MRSA/SA DNA. The Xpert SA Nasal Complete assay is intended to aid in the prevention and control of MRSA/SA infections in healthcare settings. The assay is not intended to diagnose, guide or monitor treatment for MRSA/SA infections, or provide results of susceptibility to methicillin. A negative result does not preclude MRSA/SA nasal colonization.    CBC with platelets differential    Narrative    The following orders were created for panel order CBC with platelets differential.  Procedure                               Abnormality         Status                     ---------                               -----------         ------                     CBC with platelets and d...[[id number]]  Abnormal            Preliminary result         RBC and Platelet Morphology[[id number]]                      In process                   Please view results for these tests on the individual orders.   Comprehensive metabolic panel   Result Value Ref Range    Sodium 132 (L) 135 - 145 mmol/L    Potassium 4.1 3.4 - 5.3 mmol/L    Carbon Dioxide (CO2) 20 (L) 22 - 29 mmol/L    Anion Gap 12 7 - 15 mmol/L    Urea Nitrogen 10.4 6.0 - 20.0 mg/dL    Creatinine 0.80 0.51 - 0.95 mg/dL    GFR Estimate 85 >60 mL/min/1.73m2    Calcium 8.9 8.8 - 10.4 mg/dL    Chloride 100 98 - 107 mmol/L    Glucose 138 (H) 70 - 99 mg/dL    Alkaline Phosphatase 159 (H) 40 - 150 U/L    AST 25 0 - 45 U/L    ALT 16 0 - 50 U/L    Protein Total 7.4 6.4 - 8.3 g/dL    Albumin 3.8 3.5 - 5.2 g/dL    Bilirubin Total 0.7 <=1.2 mg/dL   INR   Result Value Ref Range    INR 1.16 (H) 0.85 - 1.15   Fibrinogen activity   Result Value Ref Range     Fibrinogen Activity 661 (H) 170 - 510 mg/dL   Partial thromboplastin time   Result Value Ref Range    aPTT 33 22 - 38 Seconds   Uric acid   Result Value Ref Range    Uric Acid 3.5 2.4 - 5.7 mg/dL   Magnesium   Result Value Ref Range    Magnesium 2.2 1.7 - 2.3 mg/dL   Phosphorus   Result Value Ref Range    Phosphorus 2.9 2.5 - 4.5 mg/dL   Lactate Dehydrogenase (aka LDH)   Result Value Ref Range    Lactate Dehydrogenase 345 (H) 0 - 250 U/L   CBC with platelets and differential   Result Value Ref Range    WBC Count 4.5 4.0 - 11.0 10e3/uL    RBC Count 2.47 (L) 3.80 - 5.20 10e6/uL    Hemoglobin 9.5 (L) 11.7 - 15.7 g/dL    Hematocrit 27.4 (L) 35.0 - 47.0 %     (H) 78 - 100 fL    MCH 38.5 (H) 26.5 - 33.0 pg    MCHC 34.7 31.5 - 36.5 g/dL    RDW 16.8 (H) 10.0 - 15.0 %    Platelet Count 133 (L) 150 - 450 10e3/uL   EKG 12-lead, tracing only   Result Value Ref Range    Systolic Blood Pressure  mmHg    Diastolic Blood Pressure  mmHg    Ventricular Rate 94 BPM    Atrial Rate 94 BPM    WI Interval 126 ms    QRS Duration 76 ms     ms    QTc 412 ms    P Axis 74 degrees    R AXIS 22 degrees    T Axis 68 degrees    Interpretation ECG       Sinus rhythm  Normal ECG  When compared with ECG of 09-Dec-2024 15:11,  No significant change was found  Confirmed by [place]  ECG READING LIST, : ([id number]),  Rl Renee ([id number]) on 2/27/2025 6:41:03 AM

## 2025-02-28 LAB
ALBUMIN SERPL BCG-MCNC: 3.6 G/DL (ref 3.5–5.2)
ALP SERPL-CCNC: 140 U/L (ref 40–150)
ALT SERPL W P-5'-P-CCNC: 17 U/L (ref 0–50)
ANION GAP SERPL CALCULATED.3IONS-SCNC: 10 MMOL/L (ref 7–15)
APTT PPP: 28 SECONDS (ref 22–38)
AST SERPL W P-5'-P-CCNC: 21 U/L (ref 0–45)
BACTERIA UR CULT: NO GROWTH
BASOPHILS # BLD MANUAL: 0 10E3/UL (ref 0–0.2)
BASOPHILS NFR BLD MANUAL: 1 %
BILIRUB SERPL-MCNC: 0.5 MG/DL
BLASTS # BLD MANUAL: 0.1 10E3/UL
BLASTS NFR BLD MANUAL: 3 %
BUN SERPL-MCNC: 13.9 MG/DL (ref 6–20)
CALCIUM SERPL-MCNC: 8.8 MG/DL (ref 8.8–10.4)
CHLORIDE SERPL-SCNC: 102 MMOL/L (ref 98–107)
CREAT SERPL-MCNC: 0.7 MG/DL (ref 0.51–0.95)
EGFRCR SERPLBLD CKD-EPI 2021: >90 ML/MIN/1.73M2
EOSINOPHIL # BLD MANUAL: 0.1 10E3/UL (ref 0–0.7)
EOSINOPHIL NFR BLD MANUAL: 3 %
ERYTHROCYTE [DISTWIDTH] IN BLOOD BY AUTOMATED COUNT: 16.7 % (ref 10–15)
FIBRINOGEN PPP-MCNC: 684 MG/DL (ref 170–510)
GLUCOSE SERPL-MCNC: 115 MG/DL (ref 70–99)
HCO3 SERPL-SCNC: 22 MMOL/L (ref 22–29)
HCT VFR BLD AUTO: 25.7 % (ref 35–47)
HGB BLD-MCNC: 8.6 G/DL (ref 11.7–15.7)
INR PPP: 1.1 (ref 0.85–1.15)
LDH SERPL L TO P-CCNC: 296 U/L (ref 0–250)
LYMPHOCYTES # BLD MANUAL: 1.7 10E3/UL (ref 0.8–5.3)
LYMPHOCYTES NFR BLD MANUAL: 44 %
MAGNESIUM SERPL-MCNC: 2.2 MG/DL (ref 1.7–2.3)
MCH RBC QN AUTO: 38.1 PG (ref 26.5–33)
MCHC RBC AUTO-ENTMCNC: 33.5 G/DL (ref 31.5–36.5)
MCV RBC AUTO: 114 FL (ref 78–100)
MONOCYTES # BLD MANUAL: 0.8 10E3/UL (ref 0–1.3)
MONOCYTES NFR BLD MANUAL: 21 %
NEUTROPHILS # BLD MANUAL: 1.1 10E3/UL (ref 1.6–8.3)
NEUTROPHILS NFR BLD MANUAL: 29 %
NRBC # BLD AUTO: 0.2 10E3/UL
NRBC BLD MANUAL-RTO: 4 %
PHOSPHATE SERPL-MCNC: 3.3 MG/DL (ref 2.5–4.5)
PLAT MORPH BLD: ABNORMAL
PLATELET # BLD AUTO: 123 10E3/UL (ref 150–450)
POTASSIUM SERPL-SCNC: 4.5 MMOL/L (ref 3.4–5.3)
PROT SERPL-MCNC: 7.1 G/DL (ref 6.4–8.3)
RBC # BLD AUTO: 2.26 10E6/UL (ref 3.8–5.2)
RBC MORPH BLD: ABNORMAL
SODIUM SERPL-SCNC: 134 MMOL/L (ref 135–145)
URATE SERPL-MCNC: 3.5 MG/DL (ref 2.4–5.7)
WBC # BLD AUTO: 3.8 10E3/UL (ref 4–11)

## 2025-02-28 PROCEDURE — 99418 PROLNG IP/OBS E/M EA 15 MIN: CPT | Performed by: STUDENT IN AN ORGANIZED HEALTH CARE EDUCATION/TRAINING PROGRAM

## 2025-02-28 PROCEDURE — 84100 ASSAY OF PHOSPHORUS: CPT

## 2025-02-28 PROCEDURE — 85384 FIBRINOGEN ACTIVITY: CPT

## 2025-02-28 PROCEDURE — 250N000011 HC RX IP 250 OP 636

## 2025-02-28 PROCEDURE — 83615 LACTATE (LD) (LDH) ENZYME: CPT

## 2025-02-28 PROCEDURE — 84520 ASSAY OF UREA NITROGEN: CPT

## 2025-02-28 PROCEDURE — 36415 COLL VENOUS BLD VENIPUNCTURE: CPT

## 2025-02-28 PROCEDURE — 85007 BL SMEAR W/DIFF WBC COUNT: CPT

## 2025-02-28 PROCEDURE — 85027 COMPLETE CBC AUTOMATED: CPT

## 2025-02-28 PROCEDURE — 120N000002 HC R&B MED SURG/OB UMMC

## 2025-02-28 PROCEDURE — 258N000003 HC RX IP 258 OP 636

## 2025-02-28 PROCEDURE — 85730 THROMBOPLASTIN TIME PARTIAL: CPT

## 2025-02-28 PROCEDURE — 250N000013 HC RX MED GY IP 250 OP 250 PS 637

## 2025-02-28 PROCEDURE — 84550 ASSAY OF BLOOD/URIC ACID: CPT

## 2025-02-28 PROCEDURE — 99233 SBSQ HOSP IP/OBS HIGH 50: CPT | Mod: 24 | Performed by: STUDENT IN AN ORGANIZED HEALTH CARE EDUCATION/TRAINING PROGRAM

## 2025-02-28 PROCEDURE — 84155 ASSAY OF PROTEIN SERUM: CPT

## 2025-02-28 PROCEDURE — 85610 PROTHROMBIN TIME: CPT

## 2025-02-28 PROCEDURE — 83735 ASSAY OF MAGNESIUM: CPT

## 2025-02-28 RX ORDER — NICOTINE 21 MG/24HR
1 PATCH, TRANSDERMAL 24 HOURS TRANSDERMAL DAILY
Status: DISCONTINUED | OUTPATIENT
Start: 2025-02-28 | End: 2025-03-11

## 2025-02-28 RX ADMIN — LISINOPRIL 10 MG: 10 TABLET ORAL at 10:04

## 2025-02-28 RX ADMIN — ACETAMINOPHEN 650 MG: 325 TABLET, FILM COATED ORAL at 17:53

## 2025-02-28 RX ADMIN — FAMOTIDINE 10 MG: 10 TABLET ORAL at 19:43

## 2025-02-28 RX ADMIN — GABAPENTIN 400 MG: 300 CAPSULE ORAL at 19:42

## 2025-02-28 RX ADMIN — ACETAMINOPHEN 650 MG: 325 TABLET, FILM COATED ORAL at 07:12

## 2025-02-28 RX ADMIN — Medication 1000 UNITS: at 10:05

## 2025-02-28 RX ADMIN — FAMOTIDINE 10 MG: 10 TABLET ORAL at 10:04

## 2025-02-28 RX ADMIN — ACETAMINOPHEN 650 MG: 325 TABLET, FILM COATED ORAL at 01:18

## 2025-02-28 RX ADMIN — NICOTINE 1 PATCH: 14 PATCH, EXTENDED RELEASE TRANSDERMAL at 16:41

## 2025-02-28 RX ADMIN — OSELTAMIVIR PHOSPHATE 75 MG: 75 CAPSULE ORAL at 10:04

## 2025-02-28 RX ADMIN — CEFEPIME HYDROCHLORIDE 2 G: 2 INJECTION, POWDER, FOR SOLUTION INTRAVENOUS at 05:08

## 2025-02-28 RX ADMIN — ACETAMINOPHEN 650 MG: 325 TABLET, FILM COATED ORAL at 21:50

## 2025-02-28 RX ADMIN — PAROXETINE HYDROCHLORIDE 20 MG: 20 TABLET, FILM COATED ORAL at 10:04

## 2025-02-28 RX ADMIN — ACETAMINOPHEN 650 MG: 325 TABLET, FILM COATED ORAL at 12:37

## 2025-02-28 RX ADMIN — ACYCLOVIR 400 MG: 400 TABLET ORAL at 19:43

## 2025-02-28 RX ADMIN — ACYCLOVIR 400 MG: 400 TABLET ORAL at 10:05

## 2025-02-28 RX ADMIN — CEFEPIME HYDROCHLORIDE 2 G: 2 INJECTION, POWDER, FOR SOLUTION INTRAVENOUS at 21:27

## 2025-02-28 RX ADMIN — ENOXAPARIN SODIUM 40 MG: 40 INJECTION SUBCUTANEOUS at 19:43

## 2025-02-28 RX ADMIN — OSELTAMIVIR PHOSPHATE 75 MG: 75 CAPSULE ORAL at 19:43

## 2025-02-28 RX ADMIN — GABAPENTIN 400 MG: 300 CAPSULE ORAL at 14:41

## 2025-02-28 RX ADMIN — ALLOPURINOL 300 MG: 300 TABLET ORAL at 10:04

## 2025-02-28 RX ADMIN — GABAPENTIN 400 MG: 300 CAPSULE ORAL at 10:05

## 2025-02-28 RX ADMIN — FLUTICASONE FUROATE AND VILANTEROL TRIFENATATE 1 PUFF: 100; 25 POWDER RESPIRATORY (INHALATION) at 10:08

## 2025-02-28 RX ADMIN — MICAFUNGIN SODIUM 50 MG: 50 INJECTION, POWDER, LYOPHILIZED, FOR SOLUTION INTRAVENOUS at 19:46

## 2025-02-28 RX ADMIN — CEFEPIME HYDROCHLORIDE 2 G: 2 INJECTION, POWDER, FOR SOLUTION INTRAVENOUS at 12:37

## 2025-02-28 ASSESSMENT — ACTIVITIES OF DAILY LIVING (ADL)
ADLS_ACUITY_SCORE: 31
ADLS_ACUITY_SCORE: 35
ADLS_ACUITY_SCORE: 31
ADLS_ACUITY_SCORE: 35
ADLS_ACUITY_SCORE: 35
ADLS_ACUITY_SCORE: 31
ADLS_ACUITY_SCORE: 35
ADLS_ACUITY_SCORE: 35
ADLS_ACUITY_SCORE: 31
ADLS_ACUITY_SCORE: 35
ADLS_ACUITY_SCORE: 31

## 2025-02-28 NOTE — PLAN OF CARE
"Goal Outcome Evaluation:      Plan of Care Reviewed With: patient    Overall Patient Progress: no changeOverall Patient Progress: no change    Outcome Evaluation: Awaiting on pathology results from OSH?    [id number]:  A&O x4.  Afebrile.  OVSS on room air.  C/o HA, not migraine per pt and generalized pain, given PRN Tylenol x 1  Denies nausea, vomiting, chest pain and SOB.  Fair PO intake.  Voiding spontanesoul.  LBM \"the day before I came into the hospital\" 2/25.  Active bowel sounds and passing flatus, offer PRN laxative/stool softener medications.  Up ad vanda.  Frequently ambulates outside to smoke.    Notified Heme Malignancy DESEAN Nguyen Pt said she experiencing slight ringing, buzzing sound and had an episode of drainage from Right ear.  And is does not have any cigarettes and is willing to try nicotine patch.  DESEAN will order patch and come see pt.  "

## 2025-02-28 NOTE — PLAN OF CARE
Goal Outcome Evaluation:      Plan of Care Reviewed With: patient    Overall Patient Progress: no changeOverall Patient Progress: no change    Outcome Evaluation: waiting for pathology to determine treatment plan    AO x4. VSS on room air. Pain is improving, managing with PRN tylenol. PIV is saline locked. Voiding spontaneously, last bowel movement was 2/25. Up ad vanda. Regular diet. Waiting on pathology to determine treatment plan. No acute events over night.

## 2025-02-28 NOTE — PROGRESS NOTES
[facility]    Hematology / Oncology Progress Note  Hospital Day # 2  Date of Service (when I saw the patient): 02/28/2025     Assessment & Plan   Alejandra Villegas is a 59 year old female who presents with PMHx of COPD, migraines, rheumatoid arthritis, and anxiety. She was being followed at outside hematology/oncology clinic for cytopenias. Completed Bmbx at OSH 2/13/24 showing hypercellular marrow w/ 4% blasts and NPM1, IDH2, and NRAS mutations. She was admitted to [facility] 2/26/25 for further workup and management. Course has been complicated by neutropenic fevers, influenza A.    Today:  - Slides requested to be sent to [facility] x2/26 for review for diagnosis confirmation. Further treatment decisions pending based on results and further d/w patient/family.   - Afebrile overnight. C/w Tamiflu (2/26-3/3) for Influenza A. BCx2 (2/26) NGTD. C/w cefepime; pending fever curve, can consider discontinuing cefepime in coming 1-2 days.  - LBM 2/25; patient reports she does not have BMs daily and continues to pass gas. Agreeable to trialing senna today.   - Continue with best supportive cares.     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 Hgb 12. Was referred to local hematology/oncology clinic for further evaluation and seen by Dr. Harris. Bmbx 2/10/25 done at OSH showed hypercellular marrow w/ trilineage hematopoiesis w/ dyspoiesis with mildly elevated blasts of 4% on morphology. NGS w/ NPM1, IDH2, and NRAS mutations. She was admitted to [facility] 2/26/25 for further workup and management.   - Ordered baseline echo w/ EF 60-65%, mild known aortic stenosis. EKG NSR.   - Ordered baseline viral serologies on admission: HepB/HepC, HIV negative, HSV 1/2, CMV, EBV,  IgG positive.   - Sent HLA Typing BMT complete on admission. Will require inpatient BMT NT consult at some point during hospital stay; message sent to notify BMT team x2/27.   - Requested and confirmed request of OSH BMBx slides on admission (2/26) and notified [facility] special heme lab to expedite review as new diagnosis.  D/w outside lab facility again x2/27 and 2/28 regarding shipment slides and treatment decisions pending [facility] review.  - Further treatment decisions forthcoming on the basis of final BMBx results and discussions with patient/family; however, anticipate starting induction chemotherapy within the next 1-2 days. Will defer PICC placement at this time until definitive treatment decisions are made.      # Panycytopenia  Secondary to underlying hematologic malignancy.  - Follow daily CBC with differential  - Transfuse to keep Hgb >7, plt >10K  - Blood consent obtained 2/26/25 on admission and placed in patients paper chart.      # Risk for TLS  Secondary to hematologic malignancy, no evidence of TLS on admission.   - Started allopurinol 300 mg daily on admission  - Trend TLS labs daily for now; can increase frequency PRN with treatment initiation  - Management of TLS:  If Cr increasing, start gentle IVF  If uric acid >8, give rasburicase 6 mg x1 dose  If phos >5, start Phoslo 667 mg TID  Correction of additional electrolyte abnormalities PRN per usual means     # Risk for DIC  Secondary to underlying hematologic malignancy.  - Follow daily coags  - Transfuse cryo to keep fibrinogen >100, FFP to keep INR <1.8     ID  #ID prophylaxis  Acyclovir 400 mg BID  Levaquin 250 mg daily, held with IV abx as below  Micafungin 50 mg daily  - PLC placed on admission for posa/vori coverage; vori w/ $1.60 copay, posa requiring PA.      # Neutropenic fever  # (+) Influenza A  # Rhinitis  On admission, patient noted subjective fever with chills and rhinitis 2/25 PM prior to admission. She denies other infectious symptoms  such as headaches, new/worsening shortness of breath or cough beyond baseline w/ COPD hx, diarrhea, abdominal pain/cramping. Afebrile and otherwise HDS on admission; basic ID workup completed w/ RVP/COVID/Influenza ordered.  Febrile to 100.7 PM 2/26. (+) Influenza A. Started cefepime and Tamiflu 75 mg BID x 5 days. COVID/RVP otherwise negative. CXR w/ streaky opacities, no consolidations. UA negative. BCx2 obtained.   - BCx2 (2/26): NGTD  - C/w Tamiflu 75 mg BID x5 days (2/26-3/3)  - C/w cefepime (2/26-x); pending fever curve, can consider discontinuing cefepime in coming 1-2 days.     GI/  #Nausea/Vomiting, resolved  Patient noted ongoing nausea w/ occasional vomiting starting Dec 2024. Has been managed with prn zofran.  Nausea on admission, now resolved.   - PRN zofran and compazine     #Urinary frequency, resolved  #Dysuria, resolved  On admission, patient noted longstanding history of urinary frequency though new mild dysuria. Denies hematuria, bladder tenderness of CVA tenderness  - UA (2/26) negative, UC with no growth.     # GERD  Noted h/o on admission, noted symptoms increasing leading up to admission  - Start famotidine 10 mg BID     PULM  #COPD  Longstanding history of COPD. On admission patient reports symptoms are manageable and no recent exacerbations.   - Continue PTA Breo Ellipta inhaler and prn DuoNebs      CV  #Essential hypertension  Continue PTA lisinopril     #Mixed hyperlipidemia  Lipid panel have remained at goal, 1/14/25 panel w/ cholesterol 163, , LDL 92, HDL 45. - Held PTA atorvastatin on admission.     # H/o aortic stenosis  Patient noted on admission longstanding history of aortic stenosis. Prechemo echo w/ EF 60-65% and mild aortic stenosis and insufficiency. No previous echo to compare.      RENAL  # H/o CKD, stage 2  Baseline Cr ~ 0.7. On admission Cr 0.77.  - Continue to trend on daily labs and encourage PO hydration      NEURO  #Degenerative disc disease, L5-S1  Continue PTA  "gabapentin     #Chronic migraine w/o aura  Patient noted has been chronic since she was a child. Triggered by \"cracking her neck the wrong way\" and managed with prn regimen below along with resting in a dimly lit room.   - Continue PTA prn sumatriptan and prn cyclobenzaprine     ENDO  #Prediabetes  Last A1c 6.0 x12/9/24. Has been managing with lifestyle modifications.      MISC  #Rheumatoid arthritis   Patient reported trying treatment though was unable to tolerate well. Though notes manages w/ tylenol prn. RF completed at OSH 2/10 > 650. JI negative.       # BROOKS  # MDD  # Coping with new diagnosis  Patient reports good control of anxiety/depressive symptoms prior to admission. Did note feeling overwhelmed by new diagnosis. On admission discussed inpatient services such as health psychology or cordelia, she respectfully declined though will consider.   - Readdress health psych consult as indicated  - Continue PTA paroxetine      #Vit D Deficiency  Continue PTA vit D supplement     #Tobacco use - Patient reported starting at age 16 and has averaged 1.5 ppd up until about Jan 2025 where she has decreased use to about 1/2 ppd. She has continued to go outside to smoke on admission, discussed that in the setting of a new diagnosis and anticipated start of chemotherapy coming days though is understandably stressful, that continuing to smoke would precipitate additional complications or concerns.  She expressed understanding, planning for further smoking cessation with starting chemo. Offered NRT on admission and 2/28 that she has respectfully declined.   - Readdress NRT as indicated.     Fluids/Electrolytes/Nutrition   - IVF prn   - PRN lyte replacement per standing protocol  - Regular diet as tolerated     Lines: PIV    PPX  VTE: Lovenox, hold for plts < 50k  GI: Famotidine 10 mg BID  Bowels: prn senna and miralax  Activity: as tolerated    Code: DNR/DNI    Medically Ready for Discharge: Anticipated in 5+ " Days    Disposition: Admitted for further workup and management. Discharge pending treatment decisions and clinical course.   Follow-up: Will need to request APPT18 closer to discharge date and determine primary oncologist.     I spent 55 minutes face-to-face and/or coordinating or discussing care plan. Over 50% of our time on the unit was spent counseling the patient and coordinating care    Patient is seen and examined by Dr. Collins and I.  Assessment and plan are discussed and delivered to the patient.    Chelsie Murphy PA-C  Hematology/Oncology  Pager: [phone]    Interval History   Overnight notes reviewed. Afebrile and HDS. No acute events overnight.     Alejandra is sitting up in her bed this morning.  Continues to feel well.  She does endorse feeling slightly more anxious regarding waiting for pathology review of bone marrow biopsy slides.  Will plan to provide updates when available patient appreciates reassurance.  I offered additional help with psych services that she respectfully declined.  She continues to go outside to smoke, we discussed that in the setting of a new diagnosis and anticipated start of chemotherapy coming days though is understandably stressful, that continuing to smoke would precipitate additional complications or concerns.  She expressed understanding, planning for further smoking cessation with starting chemo.  Readdress NRT and involvement to health psychology to which she appreciates though respectfully declined at this time.  Continues to endorse some mild bodyaches though reports flu symptoms have been improving every day.  No further nausea/vomiting.  LBM 2/25, patient reports she does not have BMs daily and continues to pass gas though agreeable to trialing senna today.  She otherwise denies fever, chills, mouth sores, SOB, cough, abdominal pain, diarrhea, nausea, vomiting, dysuria, hematuria, numbness, tingling, swelling. Denies further questions or concerns at this time.  "    Complete and Comprehensive review of systems review and negative other than noted here or in the HPI.     Physical Exam   Temp: 98.2  F (36.8  C) Temp src: Oral BP: 107/69 Pulse: 78   Resp: 18 SpO2: 95 % O2 Device: None (Room air)    Vitals:    02/26/25 1252 02/27/25 0808 02/28/25 0808   Weight: 80 kg (176 lb 6.4 oz) 80.7 kg (178 lb) 80.1 kg (176 lb 8 oz)     Vital Signs with Ranges  Temp:  [97.4  F (36.3  C)-98.8  F (37.1  C)] 98.2  F (36.8  C)  Pulse:  [76-88] 78  Resp:  [16-18] 18  BP: ()/(60-76) 107/69  SpO2:  [93 %-97 %] 95 %  I/O last 3 completed shifts:  In: 720 [P.O.:520; I.V.:200]  Out: 3600 [Urine:3600]      Physical Exam:    Blood pressure 107/69, pulse 78, temperature 98.2  F (36.8  C), temperature source Oral, resp. rate 18, height 1.626 m (5' 4\"), weight 80.1 kg (176 lb 8 oz), last menstrual period 01/01/2007, SpO2 95%, not currently breastfeeding.    Constitutional: Awake and conversational. Non-toxic appearing. No acute distress.   HEENT: Normocephalic, atraumatic. Sclerae anicteric. Moist mucus membranes without lesions, abscess, or thrush.    Respiratory: Breathing comfortably on room air with no accessory muscle use. Speaking in full sentences, no evidence of respiratory distress. Lungs CTAB, no wheeze or rales.   Cardiovascular: Regular rate and rhythm. Soft systolic murmur consistent with known aortic stenosis.  Trace peripheral edema.    GI: Abdomen with normoactive bowel sounds, soft, non-distended, and non-tender throughout. No rebound or guarding.   Skin: Skin is clean, dry, intact. No jaundice or significant rashes appreciated.   Neurologic: Alert and oriented with normal speech. Memory and thought process preserved. Moves all extremities spontaneously.   Neuropsychiatric: Calm, appropriate mood and affect congruent to situation. Good judgment and insight.   Vascular access: PIV. CDI. No evidence of erythema or inflammation.       Medications   Current Facility-Administered " Medications   Medication Dose Route Frequency Provider Last Rate Last Admin     Current Facility-Administered Medications   Medication Dose Route Frequency Provider Last Rate Last Admin    acyclovir (ZOVIRAX) tablet 400 mg  400 mg Oral BID Chelsie Murphy PA-C   400 mg at 02/27/25 1953    allopurinol (ZYLOPRIM) tablet 300 mg  300 mg Oral Daily Chelsie Murphy PA-C   300 mg at 02/27/25 0758    ceFEPIme (MAXIPIME) 2 g vial to attach to  mL bag for ADULTS or NS 50 mL bag for PEDS  2 g Intravenous Q8H Melanie Kulkarni MD   2 g at 02/28/25 0508    enoxaparin ANTICOAGULANT (LOVENOX) injection 40 mg  40 mg Subcutaneous Q24H Chelsie Murphy PA-C   40 mg at 02/27/25 1953    famotidine (PEPCID) tablet 10 mg  10 mg Oral BID Chelsie Murphy PA-C   10 mg at 02/27/25 1953    fluticasone-vilanterol (BREO ELLIPTA) 100-25 MCG/ACT inhaler 1 puff  1 puff Inhalation Daily Chelsie Murphy PA-C   1 puff at 02/27/25 0910    gabapentin (NEURONTIN) capsule 400 mg  400 mg Oral TID Chelsie Murphy PA-C   400 mg at 02/27/25 1953    [Held by provider] levofloxacin (LEVAQUIN) tablet 250 mg  250 mg Oral Daily Chelsie Murphy PA-C   250 mg at 02/26/25 1400    lisinopril (ZESTRIL) tablet 10 mg  10 mg Oral Daily Chelsie Murphy PA-C   10 mg at 02/27/25 0759    micafungin (MYCAMINE) 50 mg in sodium chloride 0.9 % 100 mL intermittent infusion  50 mg Intravenous Q24H Chelsie Murphy PA-C 100 mL/hr at 02/27/25 1954 50 mg at 02/27/25 1954    oseltamivir (TAMIFLU) capsule 75 mg  75 mg Oral BID Melanie Kulkarni MD   75 mg at 02/27/25 1953    PARoxetine (PAXIL) tablet 20 mg  20 mg Oral QAM Chelsie Murphy PA-C   20 mg at 02/27/25 0759    sodium chloride (PF) 0.9% PF flush 3 mL  3 mL Intracatheter Q8H Chelsie Murphy PA-C   3 mL at 02/27/25 0912    Vitamin D3 (CHOLECALCIFEROL) tablet 1,000 Units  1,000 Units Oral Daily  Chelsie Murphy PA-C   1,000 Units at 02/27/25 0758       Data   Results for orders placed or performed during the hospital encounter of 02/26/25 (from the past 24 hours)   WBC and differential *Canceled*    Narrative    The following orders were created for panel order WBC and differential.  Procedure                               Abnormality         Status                     ---------                               -----------         ------                       Please view results for these tests on the individual orders.

## 2025-03-01 LAB
ABO + RH BLD: NORMAL
ALBUMIN SERPL BCG-MCNC: 3.6 G/DL (ref 3.5–5.2)
ALP SERPL-CCNC: 144 U/L (ref 40–150)
ALT SERPL W P-5'-P-CCNC: 15 U/L (ref 0–50)
ANION GAP SERPL CALCULATED.3IONS-SCNC: 10 MMOL/L (ref 7–15)
APTT PPP: 32 SECONDS (ref 22–38)
AST SERPL W P-5'-P-CCNC: 21 U/L (ref 0–45)
BASOPHILS # BLD AUTO: 0 10E3/UL (ref 0–0.2)
BASOPHILS NFR BLD AUTO: 0 %
BILIRUB SERPL-MCNC: 0.5 MG/DL
BLD GP AB SCN SERPL QL: NEGATIVE
BUN SERPL-MCNC: 15.3 MG/DL (ref 6–20)
CALCIUM SERPL-MCNC: 8.8 MG/DL (ref 8.8–10.4)
CHLORIDE SERPL-SCNC: 102 MMOL/L (ref 98–107)
CREAT SERPL-MCNC: 0.7 MG/DL (ref 0.51–0.95)
EGFRCR SERPLBLD CKD-EPI 2021: >90 ML/MIN/1.73M2
EOSINOPHIL # BLD AUTO: 0 10E3/UL (ref 0–0.7)
EOSINOPHIL NFR BLD AUTO: 1 %
ERYTHROCYTE [DISTWIDTH] IN BLOOD BY AUTOMATED COUNT: 16.3 % (ref 10–15)
FIBRINOGEN PPP-MCNC: 745 MG/DL (ref 170–510)
GLUCOSE SERPL-MCNC: 161 MG/DL (ref 70–99)
HCO3 SERPL-SCNC: 24 MMOL/L (ref 22–29)
HCT VFR BLD AUTO: 23.3 % (ref 35–47)
HGB BLD-MCNC: 8.2 G/DL (ref 11.7–15.7)
IMM GRANULOCYTES # BLD: 0 10E3/UL
IMM GRANULOCYTES NFR BLD: 0 %
INR PPP: 1.06 (ref 0.85–1.15)
LDH SERPL L TO P-CCNC: 284 U/L (ref 0–250)
LYMPHOCYTES # BLD AUTO: 1.8 10E3/UL (ref 0.8–5.3)
LYMPHOCYTES NFR BLD AUTO: 52 %
MAGNESIUM SERPL-MCNC: 2.3 MG/DL (ref 1.7–2.3)
MCH RBC QN AUTO: 38.9 PG (ref 26.5–33)
MCHC RBC AUTO-ENTMCNC: 35.2 G/DL (ref 31.5–36.5)
MCV RBC AUTO: 110 FL (ref 78–100)
MONOCYTES # BLD AUTO: 0.8 10E3/UL (ref 0–1.3)
MONOCYTES NFR BLD AUTO: 24 %
NEUTROPHILS # BLD AUTO: 0.8 10E3/UL (ref 1.6–8.3)
NEUTROPHILS NFR BLD AUTO: 23 %
NRBC # BLD AUTO: 0.1 10E3/UL
NRBC BLD AUTO-RTO: 3 /100
PHOSPHATE SERPL-MCNC: 3.9 MG/DL (ref 2.5–4.5)
PLAT MORPH BLD: ABNORMAL
PLATELET # BLD AUTO: 129 10E3/UL (ref 150–450)
POLYCHROMASIA BLD QL SMEAR: SLIGHT
POTASSIUM SERPL-SCNC: 4.3 MMOL/L (ref 3.4–5.3)
PROT SERPL-MCNC: 6.9 G/DL (ref 6.4–8.3)
RBC # BLD AUTO: 2.11 10E6/UL (ref 3.8–5.2)
RBC MORPH BLD: ABNORMAL
SODIUM SERPL-SCNC: 136 MMOL/L (ref 135–145)
SPECIMEN EXP DATE BLD: NORMAL
URATE SERPL-MCNC: 3 MG/DL (ref 2.4–5.7)
WBC # BLD AUTO: 3.4 10E3/UL (ref 4–11)

## 2025-03-01 PROCEDURE — 250N000013 HC RX MED GY IP 250 OP 250 PS 637

## 2025-03-01 PROCEDURE — 120N000002 HC R&B MED SURG/OB UMMC

## 2025-03-01 PROCEDURE — 250N000011 HC RX IP 250 OP 636

## 2025-03-01 PROCEDURE — 85004 AUTOMATED DIFF WBC COUNT: CPT

## 2025-03-01 PROCEDURE — 84550 ASSAY OF BLOOD/URIC ACID: CPT

## 2025-03-01 PROCEDURE — 86900 BLOOD TYPING SEROLOGIC ABO: CPT

## 2025-03-01 PROCEDURE — 258N000003 HC RX IP 258 OP 636

## 2025-03-01 PROCEDURE — 85730 THROMBOPLASTIN TIME PARTIAL: CPT

## 2025-03-01 PROCEDURE — 36415 COLL VENOUS BLD VENIPUNCTURE: CPT

## 2025-03-01 PROCEDURE — 83735 ASSAY OF MAGNESIUM: CPT

## 2025-03-01 PROCEDURE — 85041 AUTOMATED RBC COUNT: CPT

## 2025-03-01 PROCEDURE — 99233 SBSQ HOSP IP/OBS HIGH 50: CPT | Mod: 24 | Performed by: STUDENT IN AN ORGANIZED HEALTH CARE EDUCATION/TRAINING PROGRAM

## 2025-03-01 PROCEDURE — 83615 LACTATE (LD) (LDH) ENZYME: CPT

## 2025-03-01 PROCEDURE — 80053 COMPREHEN METABOLIC PANEL: CPT

## 2025-03-01 PROCEDURE — 84100 ASSAY OF PHOSPHORUS: CPT

## 2025-03-01 PROCEDURE — 85610 PROTHROMBIN TIME: CPT

## 2025-03-01 PROCEDURE — 85384 FIBRINOGEN ACTIVITY: CPT

## 2025-03-01 PROCEDURE — 99418 PROLNG IP/OBS E/M EA 15 MIN: CPT | Performed by: STUDENT IN AN ORGANIZED HEALTH CARE EDUCATION/TRAINING PROGRAM

## 2025-03-01 RX ORDER — LEVOFLOXACIN 500 MG/1
500 TABLET, FILM COATED ORAL DAILY
Status: DISCONTINUED | OUTPATIENT
Start: 2025-03-02 | End: 2025-03-03

## 2025-03-01 RX ADMIN — OSELTAMIVIR PHOSPHATE 75 MG: 75 CAPSULE ORAL at 08:28

## 2025-03-01 RX ADMIN — ACETAMINOPHEN 650 MG: 325 TABLET, FILM COATED ORAL at 10:36

## 2025-03-01 RX ADMIN — FAMOTIDINE 10 MG: 10 TABLET ORAL at 08:27

## 2025-03-01 RX ADMIN — FLUTICASONE FUROATE AND VILANTEROL TRIFENATATE 1 PUFF: 100; 25 POWDER RESPIRATORY (INHALATION) at 08:26

## 2025-03-01 RX ADMIN — CEFEPIME HYDROCHLORIDE 2 G: 2 INJECTION, POWDER, FOR SOLUTION INTRAVENOUS at 05:44

## 2025-03-01 RX ADMIN — ACETAMINOPHEN 650 MG: 325 TABLET, FILM COATED ORAL at 22:01

## 2025-03-01 RX ADMIN — MICAFUNGIN SODIUM 50 MG: 50 INJECTION, POWDER, LYOPHILIZED, FOR SOLUTION INTRAVENOUS at 20:35

## 2025-03-01 RX ADMIN — ACETAMINOPHEN 650 MG: 325 TABLET, FILM COATED ORAL at 05:26

## 2025-03-01 RX ADMIN — PAROXETINE HYDROCHLORIDE 20 MG: 20 TABLET, FILM COATED ORAL at 08:27

## 2025-03-01 RX ADMIN — FAMOTIDINE 10 MG: 10 TABLET ORAL at 20:39

## 2025-03-01 RX ADMIN — ENOXAPARIN SODIUM 40 MG: 40 INJECTION SUBCUTANEOUS at 20:39

## 2025-03-01 RX ADMIN — GABAPENTIN 400 MG: 300 CAPSULE ORAL at 17:49

## 2025-03-01 RX ADMIN — Medication 1000 UNITS: at 08:27

## 2025-03-01 RX ADMIN — GABAPENTIN 400 MG: 300 CAPSULE ORAL at 08:27

## 2025-03-01 RX ADMIN — FLUTICASONE PROPIONATE 2 SPRAY: 50 SPRAY, METERED NASAL at 08:27

## 2025-03-01 RX ADMIN — ALLOPURINOL 300 MG: 300 TABLET ORAL at 08:27

## 2025-03-01 RX ADMIN — GABAPENTIN 400 MG: 300 CAPSULE ORAL at 22:02

## 2025-03-01 RX ADMIN — ACYCLOVIR 400 MG: 400 TABLET ORAL at 08:27

## 2025-03-01 RX ADMIN — ACYCLOVIR 400 MG: 400 TABLET ORAL at 20:40

## 2025-03-01 RX ADMIN — OSELTAMIVIR PHOSPHATE 75 MG: 75 CAPSULE ORAL at 20:39

## 2025-03-01 RX ADMIN — ACETAMINOPHEN 650 MG: 325 TABLET, FILM COATED ORAL at 17:49

## 2025-03-01 NOTE — PLAN OF CARE
"Goal Outcome Evaluation:      Plan of Care Reviewed With: patient    Overall Patient Progress: no changeOverall Patient Progress: no change       Time    /70 (BP Location: Right arm)   Pulse 74   Temp 98.2  F (36.8  C) (Oral)   Resp 20   Ht 1.626 m (5' 4\")   Wt 80.1 kg (176 lb 8 oz)   LMP 01/01/2007 (Approximate)   SpO2 92%   BMI 30.30 kg/m        Activity: UAL  Pain: 4/10 sinus headache pt took prn tylenol for pain  Neuro: A&O X4  Cardiac: WNL  Respiratory: WNL  GI/: Voids urine spontaneously and passes gas  Diet: Regular diet  Lines: PIV        Continue to monitor and follow POC      "

## 2025-03-01 NOTE — PROGRESS NOTES
Activity: Up ad vanda  Neuros: No deficits found  Cardiac: WDL  Respiratory: WDL on RA  GI/: Voids spontaneously, last BM 2/25/25  Diet: Regular  Skin: No issues found  Lines: Right PIV SL  Incisions/Drains: None  Labs: Reviewed  New changes this shift: Continue to have sinus headaches, Tylenol 650 mg given q4hr prn with little effect  Plan: Waiting BMbx results from outside hospital, continue to monitor

## 2025-03-01 NOTE — PROGRESS NOTES
[facility]    Hematology / Oncology Progress Note  Hospital Day # 3  Date of Service (when I saw the patient): 03/01/2025     Assessment & Plan   Alejandra Villegas is a 59 year old female who presents with PMHx of COPD, migraines, rheumatoid arthritis, and anxiety. She was being followed at outside hematology/oncology clinic for cytopenias. Completed Bmbx at OSH 2/13/24 showing hypercellular marrow w/ 4% blasts and NPM1, IDH2, and NRAS mutations. She was admitted to [facility] 2/26/25 for further workup and management. Course has been complicated by neutropenic fevers, influenza A.    Today:  - Slides requested to be sent to [facility] x2/26 for review for diagnosis confirmation. Further treatment decisions pending based on results and further d/w patient/family.    - ok to wait to start treatment as patient remains clinically stable, WBC stable and minimal peripheral blasts.   - Remains afebrile since 2/26. Plan for a full course of Tamiflu (2/26-3/3) for Influenza A. BCx2 (2/26) NGTD.   - Stop Cefepime today given no repeat fevers. 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 Hgb 12. Was referred to local hematology/oncology clinic for further evaluation and seen by Dr. Harris. Bmbx 2/10/25 done at OSH showed hypercellular marrow w/ trilineage hematopoiesis w/ dyspoiesis with mildly elevated blasts of 4% on morphology. NGS w/ NPM1, IDH2, and NRAS mutations. She was admitted to [facility] 2/26/25 for further workup and management.   - Ordered baseline echo w/ EF 60-65%, mild known aortic stenosis. EKG NSR.   - Ordered baseline viral serologies on admission: HepB/HepC, HIV negative, HSV 1/2, CMV, EBV, IgG positive.   - Sent HLA Typing BMT complete on admission. Will require inpatient BMT NT consult at some point during hospital  stay; message sent to notify BMT team x2/27.   - Requested and confirmed request of OSH BMBx slides on admission (2/26) and notified [facility] special heme lab to expedite review as new diagnosis.  D/w outside lab facility again x2/27 and 2/28 regarding shipment slides and treatment decisions pending [facility] review.  - Further treatment decisions forthcoming on the basis of final BMBx results and discussions with patient/family; however, anticipate starting induction chemotherapy within the next 1-2 days. Will defer PICC placement at this time until definitive treatment decisions are made.      # Panycytopenia  Secondary to underlying hematologic malignancy.  - Follow daily CBC with differential  - Transfuse to keep Hgb >7, plt >10K  - Blood consent obtained 2/26/25 on admission and placed in patients paper chart.      # Risk for TLS  Secondary to hematologic malignancy, no evidence of TLS on admission.   - Started allopurinol 300 mg daily on admission  - Trend TLS labs daily for now; can increase frequency PRN with treatment initiation  - Management of TLS:  If Cr increasing, start gentle IVF  If uric acid >8, give rasburicase 6 mg x1 dose  If phos >5, start Phoslo 667 mg TID  Correction of additional electrolyte abnormalities PRN per usual means     # Risk for DIC  Secondary to underlying hematologic malignancy.  - Follow daily coags  - Transfuse cryo to keep fibrinogen >100, FFP to keep INR <1.8     ID  #ID prophylaxis  Acyclovir 400 mg BID  Levaquin 250 mg daily, restarted x3/1  Micafungin 50 mg daily  - PLC placed on admission for posa/vori coverage; vori w/ $1.60 copay, posa requiring PA.      # Neutropenic fever  # (+) Influenza A  # Rhinitis  On admission, patient noted subjective fever with chills and rhinitis 2/25 PM prior to admission. She denies other infectious symptoms such as headaches, new/worsening shortness of breath or cough beyond baseline w/ COPD hx, diarrhea, abdominal pain/cramping. Afebrile and  "otherwise HDS on admission; basic ID workup completed w/ RVP/COVID/Influenza ordered.  Febrile to 100.7 PM 2/26. (+) Influenza A. Started cefepime and Tamiflu 75 mg BID x 5 days. COVID/RVP otherwise negative. CXR w/ streaky opacities, no consolidations. UA negative. BCx2 obtained.   - BCx2 (2/26): NGTD  - C/w Tamiflu 75 mg BID x5 days (2/26-3/3)  - Stop cefepime (2/26-3/1) given no recurrent fevers.      GI/  #Nausea/Vomiting, resolved  Patient noted ongoing nausea w/ occasional vomiting starting Dec 2024. Has been managed with prn zofran.  Nausea on admission, now resolved.   - PRN zofran and compazine     #Urinary frequency, resolved  #Dysuria, resolved  On admission, patient noted longstanding history of urinary frequency though new mild dysuria. Denies hematuria, bladder tenderness of CVA tenderness  - UA (2/26) negative, UC with no growth.     # GERD  Noted h/o on admission, noted symptoms increasing leading up to admission  - Start famotidine 10 mg BID     PULM  #COPD  Longstanding history of COPD. On admission patient reports symptoms are manageable and no recent exacerbations.   - Continue PTA Breo Ellipta inhaler and prn DuoNebs      CV  #Essential hypertension  Continue PTA lisinopril     #Mixed hyperlipidemia  Lipid panel have remained at goal, 1/14/25 panel w/ cholesterol 163, , LDL 92, HDL 45. - Held PTA atorvastatin on admission.     # H/o aortic stenosis  Patient noted on admission longstanding history of aortic stenosis. Prechemo echo w/ EF 60-65% and mild aortic stenosis and insufficiency. No previous echo to compare.      RENAL  # H/o CKD, stage 2  Baseline Cr ~ 0.7. On admission Cr 0.77.  - Continue to trend on daily labs and encourage PO hydration      NEURO  #Degenerative disc disease, L5-S1  Continue PTA gabapentin     #Chronic migraine w/o aura  Patient noted has been chronic since she was a child. Triggered by \"cracking her neck the wrong way\" and managed with prn regimen below along " with resting in a dimly lit room.   - Continue PTA prn sumatriptan and prn cyclobenzaprine     ENDO  #Prediabetes  Last A1c 6.0 x12/9/24. Has been managing with lifestyle modifications.      MISC  #Rheumatoid arthritis   Patient reported trying treatment though was unable to tolerate well. Though notes manages w/ tylenol prn. RF completed at OSH 2/10 > 650. JI negative.       # BROOKS  # MDD  # Coping with new diagnosis  Patient reports good control of anxiety/depressive symptoms prior to admission. Did note feeling overwhelmed by new diagnosis. On admission discussed inpatient services such as health psychology or cordelia, she respectfully declined though will consider.   - Readdress health psych consult as indicated  - Continue PTA paroxetine      #Vit D Deficiency  Continue PTA vit D supplement     #Tobacco use - Patient reported starting at age 16 and has averaged 1.5 ppd up until about Jan 2025 where she has decreased use to about 1/2 ppd. She has continued to go outside to smoke on admission, discussed that in the setting of a new diagnosis and anticipated start of chemotherapy coming days though is understandably stressful, that continuing to smoke would precipitate additional complications or concerns.  She expressed understanding, planning for further smoking cessation with starting chemo. Offered NRT on admission and 2/28 that she has respectfully declined.   - Readdress NRT as indicated.     Fluids/Electrolytes/Nutrition   - IVF prn   - PRN lyte replacement per standing protocol  - Regular diet as tolerated     Lines: PIV    PPX  VTE: Lovenox, hold for plts < 50k  GI: Famotidine 10 mg BID  Bowels: prn senna and miralax  Activity: as tolerated    Code: DNR/DNI    Medically Ready for Discharge: Anticipated in 5+ Days    Disposition: Admitted for further workup and management. Discharge pending treatment decisions and clinical course.   Follow-up: Will need to request APPT18 closer to discharge date and  "determine primary oncologist.     I spent 55 minutes face-to-face and/or coordinating or discussing care plan. Over 50% of our time on the unit was spent counseling the patient and coordinating care    Patient is seen and examined by Dr. Collins and I.  Assessment and plan are discussed and delivered to the patient.    Jyoti Tenorio PA-C  Hematology/Oncology  Pager: [phone]    Interval History   Overnight notes reviewed. Afebrile and HDS. No acute events overnight.   Patient is sitting up in bed, working on word search. Looking forward to her visitors today. Otherwise feeling better today than previous days. Discussed that we will continue to wait for the review of her slides. Reassured her that we would typically wait given her new diagnosis of influenza until she finished treatment in other settings as well.   No other new symptoms noted today.   Appetite ok.  She otherwise denies fever, chills, mouth sores, SOB, cough, abdominal pain, diarrhea, nausea, vomiting, dysuria, hematuria, numbness, tingling, swelling. Denies further questions or concerns at this time.     Complete and Comprehensive review of systems review and negative other than noted here or in the HPI.     Physical Exam   Temp: 98.2  F (36.8  C) Temp src: Oral BP: 111/74 Pulse: 81   Resp: 18 SpO2: 96 % O2 Device: None (Room air)    Vitals:    02/27/25 0808 02/28/25 0808 03/01/25 0753   Weight: 80.7 kg (178 lb) 80.1 kg (176 lb 8 oz) 80 kg (176 lb 4.8 oz)     Vital Signs with Ranges  Temp:  [97.5  F (36.4  C)-98.2  F (36.8  C)] 98.2  F (36.8  C)  Pulse:  [71-92] 81  Resp:  [18-20] 18  BP: ()/(66-84) 111/74  SpO2:  [92 %-97 %] 96 %  I/O last 3 completed shifts:  In: 1070 [P.O.:960; I.V.:110]  Out: 1900 [Urine:1900]      Physical Exam:    Blood pressure 111/74, pulse 81, temperature 98.2  F (36.8  C), temperature source Oral, resp. rate 18, height 1.626 m (5' 4\"), weight 80 kg (176 lb 4.8 oz), last menstrual period 01/01/2007, SpO2 96%, not currently " breastfeeding.    Constitutional: Awake and conversational. Non-toxic appearing. No acute distress.   HEENT: Normocephalic, atraumatic. Sclerae anicteric. Moist mucus membranes without lesions, abscess, or thrush.    Respiratory: Breathing comfortably on room air with no accessory muscle use. Speaking in full sentences, no evidence of respiratory distress. Lungs CTAB, no wheeze or rales.   Cardiovascular: Regular rate and rhythm. Soft systolic murmur consistent with known aortic stenosis.  Trace peripheral edema.    GI: Abdomen with normoactive bowel sounds, soft, non-distended, and non-tender throughout. No rebound or guarding.   Skin: Skin is clean, dry, intact. No jaundice or significant rashes appreciated.   Neurologic: Alert and oriented with normal speech. Memory and thought process preserved. Moves all extremities spontaneously.   Neuropsychiatric: Calm, appropriate mood and affect congruent to situation. Good judgment and insight.   Vascular access: PIV. CDI. No evidence of erythema or inflammation.       Medications   Current Facility-Administered Medications   Medication Dose Route Frequency Provider Last Rate Last Admin     Current Facility-Administered Medications   Medication Dose Route Frequency Provider Last Rate Last Admin    acyclovir (ZOVIRAX) tablet 400 mg  400 mg Oral BID Chelsie Murphy PA-C   400 mg at 03/01/25 0827    allopurinol (ZYLOPRIM) tablet 300 mg  300 mg Oral Daily Chelsie Murphy PA-C   300 mg at 03/01/25 0827    ceFEPIme (MAXIPIME) 2 g vial to attach to  mL bag for ADULTS or NS 50 mL bag for PEDS  2 g Intravenous Q8H Melanie Kulkarni MD   2 g at 03/01/25 0544    enoxaparin ANTICOAGULANT (LOVENOX) injection 40 mg  40 mg Subcutaneous Q24H Chelsie Murphy PA-C   40 mg at 02/28/25 1943    famotidine (PEPCID) tablet 10 mg  10 mg Oral BID Chelsie Murphy PA-C   10 mg at 03/01/25 0827    fluticasone-vilanterol (BREO ELLIPTA) 100-25 MCG/ACT  inhaler 1 puff  1 puff Inhalation Daily Chelsie Murphy PA-C   1 puff at 03/01/25 0826    gabapentin (NEURONTIN) capsule 400 mg  400 mg Oral TID Chelsie Murphy PA-C   400 mg at 03/01/25 0827    [Held by provider] levofloxacin (LEVAQUIN) tablet 250 mg  250 mg Oral Daily Chelsie Murphy PA-C   250 mg at 02/26/25 1400    lisinopril (ZESTRIL) tablet 10 mg  10 mg Oral Daily Jyoti Tenorio PA-C   10 mg at 02/28/25 1004    micafungin (MYCAMINE) 50 mg in sodium chloride 0.9 % 100 mL intermittent infusion  50 mg Intravenous Q24H Chelsie Murphy PA-C 100 mL/hr at 02/28/25 1946 50 mg at 02/28/25 1946    nicotine (NICODERM CQ) 14 MG/24HR 24 hr patch 1 patch  1 patch Transdermal Daily Chelsie Murphy PA-C   1 patch at 02/28/25 1641    oseltamivir (TAMIFLU) capsule 75 mg  75 mg Oral BID Melanie Kulkarni MD   75 mg at 03/01/25 0828    PARoxetine (PAXIL) tablet 20 mg  20 mg Oral QAM Chelsie Murphy PA-C   20 mg at 03/01/25 0827    sodium chloride (PF) 0.9% PF flush 3 mL  3 mL Intracatheter Q8H Chelsie Murphy PA-C   3 mL at 03/01/25 0828    Vitamin D3 (CHOLECALCIFEROL) tablet 1,000 Units  1,000 Units Oral Daily Chelsie Murphy PA-C   1,000 Units at 03/01/25 0827       Data   Results for orders placed or performed during the hospital encounter of 02/26/25 (from the past 24 hours)   CBC with platelets differential    Narrative    The following orders were created for panel order CBC with platelets differential.  Procedure                               Abnormality         Status                     ---------                               -----------         ------                     CBC with platelets and d...[[id number]]  Abnormal            Final result               RBC and Platelet Morphology[[id number]]  Abnormal            Final result                 Please view results for these tests on the individual orders.   ABO/Rh type and screen     Narrative    The following orders were created for panel order ABO/Rh type and screen.  Procedure                               Abnormality         Status                     ---------                               -----------         ------                     Adult Type and Screen[[id number]]                            Final result                 Please view results for these tests on the individual orders.   Comprehensive metabolic panel   Result Value Ref Range    Sodium 136 135 - 145 mmol/L    Potassium 4.3 3.4 - 5.3 mmol/L    Carbon Dioxide (CO2) 24 22 - 29 mmol/L    Anion Gap 10 7 - 15 mmol/L    Urea Nitrogen 15.3 6.0 - 20.0 mg/dL    Creatinine 0.70 0.51 - 0.95 mg/dL    GFR Estimate >90 >60 mL/min/1.73m2    Calcium 8.8 8.8 - 10.4 mg/dL    Chloride 102 98 - 107 mmol/L    Glucose 161 (H) 70 - 99 mg/dL    Alkaline Phosphatase 144 40 - 150 U/L    AST 21 0 - 45 U/L    ALT 15 0 - 50 U/L    Protein Total 6.9 6.4 - 8.3 g/dL    Albumin 3.6 3.5 - 5.2 g/dL    Bilirubin Total 0.5 <=1.2 mg/dL   INR   Result Value Ref Range    INR 1.06 0.85 - 1.15   Fibrinogen activity   Result Value Ref Range    Fibrinogen Activity 745 (H) 170 - 510 mg/dL   Partial thromboplastin time   Result Value Ref Range    aPTT 32 22 - 38 Seconds   Uric acid   Result Value Ref Range    Uric Acid 3.0 2.4 - 5.7 mg/dL   Magnesium   Result Value Ref Range    Magnesium 2.3 1.7 - 2.3 mg/dL   Phosphorus   Result Value Ref Range    Phosphorus 3.9 2.5 - 4.5 mg/dL   Lactate Dehydrogenase (aka LDH)   Result Value Ref Range    Lactate Dehydrogenase 284 (H) 0 - 250 U/L   CBC with platelets and differential   Result Value Ref Range    WBC Count 3.4 (L) 4.0 - 11.0 10e3/uL    RBC Count 2.11 (L) 3.80 - 5.20 10e6/uL    Hemoglobin 8.2 (L) 11.7 - 15.7 g/dL    Hematocrit 23.3 (L) 35.0 - 47.0 %     (H) 78 - 100 fL    MCH 38.9 (H) 26.5 - 33.0 pg    MCHC 35.2 31.5 - 36.5 g/dL    RDW 16.3 (H) 10.0 - 15.0 %    Platelet Count 129 (L) 150 - 450 10e3/uL    % Neutrophils  23 %    % Lymphocytes 52 %    % Monocytes 24 %    % Eosinophils 1 %    % Basophils 0 %    % Immature Granulocytes 0 %    NRBCs per 100 WBC 3 (H) <1 /100    Absolute Neutrophils 0.8 (L) 1.6 - 8.3 10e3/uL    Absolute Lymphocytes 1.8 0.8 - 5.3 10e3/uL    Absolute Monocytes 0.8 0.0 - 1.3 10e3/uL    Absolute Eosinophils 0.0 0.0 - 0.7 10e3/uL    Absolute Basophils 0.0 0.0 - 0.2 10e3/uL    Absolute Immature Granulocytes 0.0 <=0.4 10e3/uL    Absolute NRBCs 0.1 10e3/uL   Adult Type and Screen   Result Value Ref Range    ABO/RH(D) A POS     Antibody Screen Negative Negative    SPECIMEN EXPIRATION DATE [id number]    RBC and Platelet Morphology   Result Value Ref Range    RBC Morphology Confirmed RBC Indices     Platelet Assessment  Automated Count Confirmed. Platelet morphology is normal.     Automated Count Confirmed. Platelet morphology is normal.    Polychromasia Slight (A) None Seen

## 2025-03-01 NOTE — PLAN OF CARE
Goal Outcome Evaluation:      Plan of Care Reviewed With: patient    Overall Patient Progress: no changeOverall Patient Progress: no change    Outcome Evaluation: Awaiting on pathology results from OSH    9981-3292:  A&O x4.  Afebrile.  Soft BPs 99/72, asymptomatic and PA-C aware.  OVSS on room air.  Given PRN Tylenol for HA with a decrease in pain.  Given PRN Flonase.  Denies nausea, vomiting, chest pain and SOB.  IV ABX stopped and will start PO ABX tomorrow (3/2) (see EMAR).  Voiding spontaneously, encouraged to save urine output.  Had a BM per pt.  Up ad vanda, frequently ambulates.

## 2025-03-02 LAB
ALBUMIN SERPL BCG-MCNC: 3.6 G/DL (ref 3.5–5.2)
ALP SERPL-CCNC: 153 U/L (ref 40–150)
ALT SERPL W P-5'-P-CCNC: 18 U/L (ref 0–50)
ANION GAP SERPL CALCULATED.3IONS-SCNC: 14 MMOL/L (ref 7–15)
APTT PPP: 32 SECONDS (ref 22–38)
AST SERPL W P-5'-P-CCNC: 23 U/L (ref 0–45)
BASOPHILS # BLD MANUAL: 0 10E3/UL (ref 0–0.2)
BASOPHILS NFR BLD MANUAL: 0 %
BILIRUB SERPL-MCNC: 0.4 MG/DL
BLASTS # BLD MANUAL: 0.1 10E3/UL
BLASTS NFR BLD MANUAL: 3 %
BUN SERPL-MCNC: 15.7 MG/DL (ref 6–20)
CALCIUM SERPL-MCNC: 8.8 MG/DL (ref 8.8–10.4)
CHLORIDE SERPL-SCNC: 100 MMOL/L (ref 98–107)
CREAT SERPL-MCNC: 0.64 MG/DL (ref 0.51–0.95)
EGFRCR SERPLBLD CKD-EPI 2021: >90 ML/MIN/1.73M2
EOSINOPHIL # BLD MANUAL: 0 10E3/UL (ref 0–0.7)
EOSINOPHIL NFR BLD MANUAL: 1 %
ERYTHROCYTE [DISTWIDTH] IN BLOOD BY AUTOMATED COUNT: 15.4 % (ref 10–15)
FIBRINOGEN PPP-MCNC: 686 MG/DL (ref 170–510)
GLUCOSE SERPL-MCNC: 163 MG/DL (ref 70–99)
HCO3 SERPL-SCNC: 23 MMOL/L (ref 22–29)
HCT VFR BLD AUTO: 24 % (ref 35–47)
HGB BLD-MCNC: 8.2 G/DL (ref 11.7–15.7)
INR PPP: 1.05 (ref 0.85–1.15)
LDH SERPL L TO P-CCNC: 303 U/L (ref 0–250)
LYMPHOCYTES # BLD MANUAL: 2 10E3/UL (ref 0.8–5.3)
LYMPHOCYTES NFR BLD MANUAL: 62 %
MAGNESIUM SERPL-MCNC: 2.2 MG/DL (ref 1.7–2.3)
MCH RBC QN AUTO: 39.8 PG (ref 26.5–33)
MCHC RBC AUTO-ENTMCNC: 34.2 G/DL (ref 31.5–36.5)
MCV RBC AUTO: 117 FL (ref 78–100)
MONOCYTES # BLD MANUAL: 0.3 10E3/UL (ref 0–1.3)
MONOCYTES NFR BLD MANUAL: 9 %
NEUTROPHILS # BLD MANUAL: 0.8 10E3/UL (ref 1.6–8.3)
NEUTROPHILS NFR BLD MANUAL: 25 %
NRBC # BLD AUTO: 0.3 10E3/UL
NRBC BLD MANUAL-RTO: 8 %
PHOSPHATE SERPL-MCNC: 3.6 MG/DL (ref 2.5–4.5)
PLAT MORPH BLD: ABNORMAL
PLATELET # BLD AUTO: 140 10E3/UL (ref 150–450)
POLYCHROMASIA BLD QL SMEAR: SLIGHT
POTASSIUM SERPL-SCNC: 3.9 MMOL/L (ref 3.4–5.3)
PROT SERPL-MCNC: 7 G/DL (ref 6.4–8.3)
RBC # BLD AUTO: 2.06 10E6/UL (ref 3.8–5.2)
RBC MORPH BLD: ABNORMAL
SODIUM SERPL-SCNC: 137 MMOL/L (ref 135–145)
URATE SERPL-MCNC: 2.7 MG/DL (ref 2.4–5.7)
WBC # BLD AUTO: 3.2 10E3/UL (ref 4–11)

## 2025-03-02 PROCEDURE — 84550 ASSAY OF BLOOD/URIC ACID: CPT

## 2025-03-02 PROCEDURE — 250N000011 HC RX IP 250 OP 636

## 2025-03-02 PROCEDURE — 99418 PROLNG IP/OBS E/M EA 15 MIN: CPT | Performed by: STUDENT IN AN ORGANIZED HEALTH CARE EDUCATION/TRAINING PROGRAM

## 2025-03-02 PROCEDURE — 82435 ASSAY OF BLOOD CHLORIDE: CPT

## 2025-03-02 PROCEDURE — 84460 ALANINE AMINO (ALT) (SGPT): CPT

## 2025-03-02 PROCEDURE — 85610 PROTHROMBIN TIME: CPT

## 2025-03-02 PROCEDURE — 250N000013 HC RX MED GY IP 250 OP 250 PS 637: Performed by: PHYSICIAN ASSISTANT

## 2025-03-02 PROCEDURE — 85730 THROMBOPLASTIN TIME PARTIAL: CPT

## 2025-03-02 PROCEDURE — 85014 HEMATOCRIT: CPT

## 2025-03-02 PROCEDURE — 84100 ASSAY OF PHOSPHORUS: CPT

## 2025-03-02 PROCEDURE — 99233 SBSQ HOSP IP/OBS HIGH 50: CPT | Mod: 24 | Performed by: STUDENT IN AN ORGANIZED HEALTH CARE EDUCATION/TRAINING PROGRAM

## 2025-03-02 PROCEDURE — 250N000013 HC RX MED GY IP 250 OP 250 PS 637

## 2025-03-02 PROCEDURE — 85007 BL SMEAR W/DIFF WBC COUNT: CPT

## 2025-03-02 PROCEDURE — 83735 ASSAY OF MAGNESIUM: CPT

## 2025-03-02 PROCEDURE — 83615 LACTATE (LD) (LDH) ENZYME: CPT

## 2025-03-02 PROCEDURE — 85384 FIBRINOGEN ACTIVITY: CPT

## 2025-03-02 PROCEDURE — 120N000002 HC R&B MED SURG/OB UMMC

## 2025-03-02 PROCEDURE — 258N000003 HC RX IP 258 OP 636

## 2025-03-02 PROCEDURE — 36415 COLL VENOUS BLD VENIPUNCTURE: CPT

## 2025-03-02 RX ADMIN — ACETAMINOPHEN 650 MG: 325 TABLET, FILM COATED ORAL at 10:03

## 2025-03-02 RX ADMIN — ALLOPURINOL 300 MG: 300 TABLET ORAL at 10:03

## 2025-03-02 RX ADMIN — ACYCLOVIR 400 MG: 400 TABLET ORAL at 20:16

## 2025-03-02 RX ADMIN — ACYCLOVIR 400 MG: 400 TABLET ORAL at 10:03

## 2025-03-02 RX ADMIN — Medication 1000 UNITS: at 10:03

## 2025-03-02 RX ADMIN — MICAFUNGIN SODIUM 50 MG: 50 INJECTION, POWDER, LYOPHILIZED, FOR SOLUTION INTRAVENOUS at 20:47

## 2025-03-02 RX ADMIN — ENOXAPARIN SODIUM 40 MG: 40 INJECTION SUBCUTANEOUS at 20:16

## 2025-03-02 RX ADMIN — FLUTICASONE FUROATE AND VILANTEROL TRIFENATATE 1 PUFF: 100; 25 POWDER RESPIRATORY (INHALATION) at 10:04

## 2025-03-02 RX ADMIN — ACETAMINOPHEN 650 MG: 325 TABLET, FILM COATED ORAL at 18:30

## 2025-03-02 RX ADMIN — GABAPENTIN 400 MG: 300 CAPSULE ORAL at 10:02

## 2025-03-02 RX ADMIN — OSELTAMIVIR PHOSPHATE 75 MG: 75 CAPSULE ORAL at 20:16

## 2025-03-02 RX ADMIN — ACETAMINOPHEN 650 MG: 325 TABLET, FILM COATED ORAL at 14:34

## 2025-03-02 RX ADMIN — LEVOFLOXACIN 500 MG: 500 TABLET, FILM COATED ORAL at 10:02

## 2025-03-02 RX ADMIN — FAMOTIDINE 10 MG: 10 TABLET ORAL at 20:16

## 2025-03-02 RX ADMIN — PAROXETINE HYDROCHLORIDE 20 MG: 20 TABLET, FILM COATED ORAL at 10:03

## 2025-03-02 RX ADMIN — FAMOTIDINE 10 MG: 10 TABLET ORAL at 10:02

## 2025-03-02 RX ADMIN — OSELTAMIVIR PHOSPHATE 75 MG: 75 CAPSULE ORAL at 10:03

## 2025-03-02 RX ADMIN — ACETAMINOPHEN 650 MG: 325 TABLET, FILM COATED ORAL at 05:19

## 2025-03-02 RX ADMIN — LISINOPRIL 10 MG: 10 TABLET ORAL at 10:02

## 2025-03-02 RX ADMIN — GABAPENTIN 400 MG: 300 CAPSULE ORAL at 14:34

## 2025-03-02 RX ADMIN — GABAPENTIN 400 MG: 300 CAPSULE ORAL at 20:16

## 2025-03-02 ASSESSMENT — ACTIVITIES OF DAILY LIVING (ADL)
ADLS_ACUITY_SCORE: 35
ADLS_ACUITY_SCORE: 35
ADLS_ACUITY_SCORE: 31
ADLS_ACUITY_SCORE: 31
ADLS_ACUITY_SCORE: 35
ADLS_ACUITY_SCORE: 31
ADLS_ACUITY_SCORE: 35
ADLS_ACUITY_SCORE: 31
ADLS_ACUITY_SCORE: 35
ADLS_ACUITY_SCORE: 31
ADLS_ACUITY_SCORE: 35
ADLS_ACUITY_SCORE: 35

## 2025-03-02 NOTE — PLAN OF CARE
"Goal Outcome Evaluation:      Plan of Care Reviewed With: patient    Overall Patient Progress: no changeOverall Patient Progress: no change     Time    /62 (BP Location: Right arm)   Pulse 82   Temp 97.6  F (36.4  C) (Oral)   Resp 18   Ht 1.626 m (5' 4\")   Wt 80 kg (176 lb 4.8 oz)   LMP 01/01/2007 (Approximate)   SpO2 95%   BMI 30.26 kg/m        Activity: UAL  Pain: 4/10 headache pain being managed with prn tylenol  Neuro: A&O X4  Cardiac: WNL  Respiratory: WNL  GI/: Voiding urine spontaneously and passing gas  Diet: Regular diet  Lines: PIV         Continue to monitor and follow POC        "

## 2025-03-02 NOTE — PLAN OF CARE
"Nursing Plan of Care Note  Shift: 1743-0068    General: A&Ox4. Able to follow commands and make needs known. Afebrile.   Pain: 6/10 headache, tylenol given with relief  New onset of left ear \"feeling plugged\"  Cardiac/Resp: VSS on RA  /GI: Tolerating regular diet. Reports x2 BM today. Voids spontaneously.   Access/infusions: PIV - SL   Activity: Up ad vanda, goes outside every few hours.   Plan: Continue with plan of care. Notify primary team with changes.         "

## 2025-03-02 NOTE — PLAN OF CARE
Goal Outcome Evaluation:    6060-8513    Plan of Care Reviewed With: patient    Overall Patient Progress: no change    VSS on RA, A&Ox4, afebrile. Denies nausea and SOB. Tylenol given 1x for headache and pain in hips with relief. UAL, frequently ambulates outside to smoke. Regular diet, tolerating well. Voiding spontaneously with adequate OP. LBM today 3/2.     Continue to monitor and follow POC, awaiting bmbx slides from OSH for diagnosis confirmation.

## 2025-03-02 NOTE — PLAN OF CARE
"Goal Outcome Evaluation:    7988-8430    Plan of Care Reviewed With: patient    Overall Patient Progress: no change    VSS on RA, A&Ox4. Afebrile. Rated headache 4/10, tylenol given with relief. Feels congestion and left ear \"feels plugged\". Tolerating regular diet. PIV SL. UAL, goes outside to smoke every few hours.     Continue to monitor and follow POC.      "

## 2025-03-02 NOTE — PLAN OF CARE
Goal Outcome Evaluation:      Plan of Care Reviewed With: patient    Overall Patient Progress: no changeOverall Patient Progress: no change    Outcome Evaluation: Awaiting on pathology results from OSH    [id number]:  A&O x4.  Afebrile.  OVSS on room air.  Given PRN Tylenol x1 for HA and bilateral hip/leg pain with a decrease in pain.  Denies nausea, vomiting, chest pain and SOB.  Good PO intake.  MENESES.  Had a BM.  Up ad vanda, frequently ambulates and goes outside to smoke cigarettes.  Continue supportive and plan of care.

## 2025-03-02 NOTE — PROGRESS NOTES
[facility]    Hematology / Oncology Progress Note  Hospital Day # 4  Date of Service (when I saw the patient): 03/02/2025     Assessment & Plan   Alejandra Villegas is a 59 year old female who presents with PMHx of COPD, migraines, rheumatoid arthritis, and anxiety. She was being followed at outside hematology/oncology clinic for cytopenias. Completed Bmbx at OSH 2/13/24 showing hypercellular marrow w/ 4% blasts and NPM1, IDH2, and NRAS mutations. She was admitted to [facility] 2/26/25 for further workup and management. Course has been complicated by neutropenic fevers, influenza A.    Today:  - Slides requested to be sent to [facility] x2/26 for review for diagnosis confirmation. Further treatment decisions pending based on results and further d/w patient/family.    - Ok to wait to start treatment as patient remains clinically stable, WBC stable and minimal peripheral blasts.   - Remains afebrile since 2/26. Plan for a full course of Tamiflu (2/26-3/3) for Influenza A. BCx2 (2/26) NGTD. Now off IV abx.    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 Hgb 12. Was referred to local hematology/oncology clinic for further evaluation and seen by Dr. Harris. Bmbx 2/10/25 done at OSH showed hypercellular marrow w/ trilineage hematopoiesis w/ dyspoiesis with mildly elevated blasts of 4% on morphology. NGS w/ NPM1, IDH2, and NRAS mutations. She was admitted to [facility] 2/26/25 for further workup and management.   - Ordered baseline echo w/ EF 60-65%, mild known aortic stenosis. EKG NSR.   - Ordered baseline viral serologies on admission: HepB/HepC, HIV negative, HSV 1/2, CMV, EBV, IgG positive.   - Sent HLA Typing BMT complete on admission. Will require inpatient BMT NT consult at some point during hospital stay; message sent to notify BMT team x2/27.   -  Requested and confirmed request of OSH BMBx slides on admission (2/26) and notified [facility] special heme lab to expedite review as new diagnosis.  D/w outside lab facility again x2/27 and 2/28 regarding shipment slides and treatment decisions pending [facility] review.  - Further treatment decisions forthcoming on the basis of final BMBx results and discussions with patient/family; however, anticipate starting induction chemotherapy within the next 1-2 days. Will defer PICC placement at this time until definitive treatment decisions are made.      # Panycytopenia  Secondary to underlying hematologic malignancy.  - Follow daily CBC with differential  - Transfuse to keep Hgb >7, plt >10K  - Blood consent obtained 2/26/25 on admission and placed in patients paper chart.      # Risk for TLS  Secondary to hematologic malignancy, no evidence of TLS on admission.   - Allopurinol 300 mg daily   - Trend TLS labs daily for now; can increase frequency PRN with treatment initiation     # Risk for DIC  Secondary to underlying hematologic malignancy.  - Follow daily coags  - Transfuse cryo to keep fibrinogen >100, FFP to keep INR <1.8     ID  # ID prophylaxis  Acyclovir 400 mg BID  Levaquin 250 mg daily, restarted x3/1  Micafungin 50 mg daily  - PLC placed on admission for posa/vori coverage; vori w/ $1.60 copay, posa requiring PA.      # Neutropenic fever  # (+) Influenza A  # Rhinitis  On admission, patient noted subjective fever with chills and rhinitis 2/25 PM prior to admission. She denies other infectious symptoms such as headaches, new/worsening shortness of breath or cough beyond baseline w/ COPD hx, diarrhea, abdominal pain/cramping. Afebrile and otherwise HDS on admission; basic ID workup completed w/ RVP/COVID/Influenza ordered.  Febrile to 100.7 PM 2/26. (+) Influenza A. Started cefepime and Tamiflu 75 mg BID x 5 days. COVID/RVP otherwise negative. CXR w/ streaky opacities, no consolidations. UA negative. BCx2 obtained.   -  "BCx2 (2/26): NGTD  - C/w Tamiflu 75 mg BID x5 days (2/26-3/3)  - Stop cefepime (2/26-3/1) given no recurrent fevers.      GI/  # Nausea/Vomiting, resolved  Patient noted ongoing nausea w/ occasional vomiting starting Dec 2024. Has been managed with prn zofran.  Nausea on admission, now resolved.   - PRN zofran and compazine     # Urinary frequency, resolved  # Dysuria, resolved  On admission, patient noted longstanding history of urinary frequency though new mild dysuria. Denies hematuria, bladder tenderness of CVA tenderness  - UA (2/26) negative, UC with no growth.     # GERD  Noted h/o on admission, noted symptoms increasing leading up to admission  - Start famotidine 10 mg BID     PULM  #COPD  Longstanding history of COPD. On admission patient reports symptoms are manageable and no recent exacerbations.   - Continue PTA Breo Ellipta inhaler and prn DuoNebs      CV  # Essential hypertension  Continue PTA lisinopril     # Mixed hyperlipidemia  Lipid panel have remained at goal, 1/14/25 panel w/ cholesterol 163, , LDL 92, HDL 45. - Held PTA atorvastatin on admission.     # H/o aortic stenosis  Patient noted on admission longstanding history of aortic stenosis. Prechemo echo w/ EF 60-65% and mild aortic stenosis and insufficiency. No previous echo to compare.      RENAL  # H/o CKD, stage 2  Baseline Cr ~ 0.7. On admission Cr 0.77.  - Continue to trend on daily labs and encourage PO hydration      NEURO  # Degenerative disc disease, L5-S1  Continue PTA gabapentin     # Chronic migraine w/o aura  Patient noted has been chronic since she was a child. Triggered by \"cracking her neck the wrong way\" and managed with prn regimen below along with resting in a dimly lit room.   - Continue PTA prn sumatriptan and prn cyclobenzaprine     ENDO  #Prediabetes  Last A1c 6.0 x12/9/24. Has been managing with lifestyle modifications.      MISC  # Rheumatoid arthritis   Patient reported trying treatment though was unable to " tolerate well. Though notes manages w/ tylenol prn. RF completed at OSH 2/10 > 650. JI negative.       # BROOKS  # MDD  # Coping with new diagnosis  Patient reports good control of anxiety/depressive symptoms prior to admission. Did note feeling overwhelmed by new diagnosis. On admission discussed inpatient services such as health psychology or cordelia, she respectfully declined though will consider.   - Readdress health psych consult as indicated  - Continue PTA paroxetine      # Vit D Deficiency  Continue PTA vit D supplement     # Tobacco use - Patient reported starting at age 16 and has averaged 1.5 ppd up until about Jan 2025 where she has decreased use to about 1/2 ppd. She has continued to go outside to smoke on admission, discussed that in the setting of a new diagnosis and anticipated start of chemotherapy coming days though is understandably stressful, that continuing to smoke would precipitate additional complications or concerns.  She expressed understanding, planning for further smoking cessation with starting chemo. Offered NRT on admission and 2/28 that she has respectfully declined.   - Readdress NRT as indicated.     Fluids/Electrolytes/Nutrition   - IVF prn   - PRN lyte replacement per standing protocol  - Regular diet as tolerated     Lines: PIV    PPX  VTE: Lovenox, hold for plts < 50k  GI: Famotidine 10 mg BID  Bowels: prn senna and miralax  Activity: as tolerated    Code: DNR/DNI    Medically Ready for Discharge: Anticipated in 5+ Days    Disposition: Admitted for further workup and management. Discharge pending treatment decisions and clinical course.   Follow-up: Will need to request APPT18 closer to discharge date and determine primary oncologist.     I spent 50 minutes face-to-face and/or coordinating or discussing care plan. Over 50% of our time on the unit was spent counseling the patient and coordinating care    Patient is seen and examined by Dr. Collins and ORA.  Assessment and plan are  "discussed and delivered to the patient.    Jyoti Tenorio PA-C  Hematology/Oncology  Pager: [phone]    Interval History   Overnight notes reviewed. Afebrile and HDS. No acute events overnight.   Alejandra is walking the halls frequently. Feeling well today. Headache improved. No other new symptoms noted.  Appetite ok.  She otherwise denies fever, chills, mouth sores, SOB, cough, abdominal pain, diarrhea, nausea, vomiting, dysuria, hematuria, numbness, tingling, swelling. Denies further questions or concerns at this time.     Complete and Comprehensive review of systems review and negative other than noted here or in the HPI.     Physical Exam   Temp: 97.9  F (36.6  C) Temp src: Oral BP: 121/82 Pulse: 79   Resp: 20 SpO2: 98 % O2 Device: None (Room air)    Vitals:    02/27/25 0808 02/28/25 0808 03/01/25 0753   Weight: 80.7 kg (178 lb) 80.1 kg (176 lb 8 oz) 80 kg (176 lb 4.8 oz)     Vital Signs with Ranges  Temp:  [97.6  F (36.4  C)-99.5  F (37.5  C)] 97.9  F (36.6  C)  Pulse:  [79-89] 79  Resp:  [18-20] 20  BP: (104-130)/(52-82) 121/82  SpO2:  [94 %-98 %] 98 %  I/O last 3 completed shifts:  In: 336 [P.O.:236; I.V.:100]  Out: 3450 [Urine:3450]      Physical Exam:    Blood pressure 121/82, pulse 79, temperature 97.9  F (36.6  C), temperature source Oral, resp. rate 20, height 1.626 m (5' 4\"), weight 80 kg (176 lb 4.8 oz), last menstrual period 01/01/2007, SpO2 98%, not currently breastfeeding.    Constitutional: Awake and conversational. Non-toxic appearing. No acute distress.   HEENT: Normocephalic, atraumatic. Sclerae anicteric. Moist mucus membranes without lesions, abscess, or thrush.    Respiratory: Breathing comfortably on room air with no accessory muscle use. Speaking in full sentences, no evidence of respiratory distress. Lungs CTAB, no wheeze or rales.   Cardiovascular: Regular rate and rhythm. Soft systolic murmur consistent with known aortic stenosis.  Trace peripheral edema.    GI: Abdomen with normoactive " bowel sounds, soft, non-distended, and non-tender throughout. No rebound or guarding.   Skin: Skin is clean, dry, intact. No jaundice or significant rashes appreciated.   Neurologic: Alert and oriented with normal speech. Memory and thought process preserved. Moves all extremities spontaneously.   Neuropsychiatric: Calm, appropriate mood and affect congruent to situation. Good judgment and insight.   Vascular access: PIV. CDI. No evidence of erythema or inflammation.       Medications   Current Facility-Administered Medications   Medication Dose Route Frequency Provider Last Rate Last Admin     Current Facility-Administered Medications   Medication Dose Route Frequency Provider Last Rate Last Admin    acyclovir (ZOVIRAX) tablet 400 mg  400 mg Oral BID Chelsie Murphy PA-C   400 mg at 03/02/25 1003    allopurinol (ZYLOPRIM) tablet 300 mg  300 mg Oral Daily Chelsie Murphy PA-C   300 mg at 03/02/25 1003    enoxaparin ANTICOAGULANT (LOVENOX) injection 40 mg  40 mg Subcutaneous Q24H Chelsie Murphy PA-C   40 mg at 03/01/25 2039    famotidine (PEPCID) tablet 10 mg  10 mg Oral BID Chelsie Murphy PA-C   10 mg at 03/02/25 1002    fluticasone-vilanterol (BREO ELLIPTA) 100-25 MCG/ACT inhaler 1 puff  1 puff Inhalation Daily Chelsie Murphy PA-C   1 puff at 03/02/25 1004    gabapentin (NEURONTIN) capsule 400 mg  400 mg Oral TID Chelsie Murphy PA-C   400 mg at 03/02/25 1002    levofloxacin (LEVAQUIN) tablet 500 mg  500 mg Oral Daily Jyoti Tenorio PA-C   500 mg at 03/02/25 1002    lisinopril (ZESTRIL) tablet 10 mg  10 mg Oral Daily Jyoti Tenorio PA-C   10 mg at 03/02/25 1002    micafungin (MYCAMINE) 50 mg in sodium chloride 0.9 % 100 mL intermittent infusion  50 mg Intravenous Q24H Chelsie Murphy PA-C 100 mL/hr at 03/01/25 2035 50 mg at 03/01/25 2035    nicotine (NICODERM CQ) 14 MG/24HR 24 hr patch 1 patch  1 patch Transdermal Daily  Chelsie Murphy PA-C   1 patch at 02/28/25 1641    oseltamivir (TAMIFLU) capsule 75 mg  75 mg Oral BID Melanie Kulkarni MD   75 mg at 03/02/25 1003    PARoxetine (PAXIL) tablet 20 mg  20 mg Oral QAM Chelsie Murphy PA-C   20 mg at 03/02/25 1003    sodium chloride (PF) 0.9% PF flush 3 mL  3 mL Intracatheter Q8H Chelsie Murphy PA-C   3 mL at 03/02/25 1003    Vitamin D3 (CHOLECALCIFEROL) tablet 1,000 Units  1,000 Units Oral Daily Chelsie Murphy PA-C   1,000 Units at 03/02/25 1003       Data   Results for orders placed or performed during the hospital encounter of 02/26/25 (from the past 24 hours)   CBC with platelets differential    Narrative    The following orders were created for panel order CBC with platelets differential.  Procedure                               Abnormality         Status                     ---------                               -----------         ------                     CBC with platelets and d...[[id number]]  Abnormal            Final result               Manual Differential[[id number]]                                                         Manual Differential[[id number]]          Abnormal            Final result                 Please view results for these tests on the individual orders.   Comprehensive metabolic panel   Result Value Ref Range    Sodium 137 135 - 145 mmol/L    Potassium 3.9 3.4 - 5.3 mmol/L    Carbon Dioxide (CO2) 23 22 - 29 mmol/L    Anion Gap 14 7 - 15 mmol/L    Urea Nitrogen 15.7 6.0 - 20.0 mg/dL    Creatinine 0.64 0.51 - 0.95 mg/dL    GFR Estimate >90 >60 mL/min/1.73m2    Calcium 8.8 8.8 - 10.4 mg/dL    Chloride 100 98 - 107 mmol/L    Glucose 163 (H) 70 - 99 mg/dL    Alkaline Phosphatase 153 (H) 40 - 150 U/L    AST 23 0 - 45 U/L    ALT 18 0 - 50 U/L    Protein Total 7.0 6.4 - 8.3 g/dL    Albumin 3.6 3.5 - 5.2 g/dL    Bilirubin Total 0.4 <=1.2 mg/dL   INR   Result Value Ref Range    INR 1.05 0.85 - 1.15   Fibrinogen activity    Result Value Ref Range    Fibrinogen Activity 686 (H) 170 - 510 mg/dL   Partial thromboplastin time   Result Value Ref Range    aPTT 32 22 - 38 Seconds   Uric acid   Result Value Ref Range    Uric Acid 2.7 2.4 - 5.7 mg/dL   Magnesium   Result Value Ref Range    Magnesium 2.2 1.7 - 2.3 mg/dL   Phosphorus   Result Value Ref Range    Phosphorus 3.6 2.5 - 4.5 mg/dL   Lactate Dehydrogenase (aka LDH)   Result Value Ref Range    Lactate Dehydrogenase 303 (H) 0 - 250 U/L   CBC with platelets and differential   Result Value Ref Range    WBC Count 3.2 (L) 4.0 - 11.0 10e3/uL    RBC Count 2.06 (L) 3.80 - 5.20 10e6/uL    Hemoglobin 8.2 (L) 11.7 - 15.7 g/dL    Hematocrit 24.0 (L) 35.0 - 47.0 %     (H) 78 - 100 fL    MCH 39.8 (H) 26.5 - 33.0 pg    MCHC 34.2 31.5 - 36.5 g/dL    RDW 15.4 (H) 10.0 - 15.0 %    Platelet Count 140 (L) 150 - 450 10e3/uL   Manual Differential   Result Value Ref Range    % Neutrophils 25 %    % Lymphocytes 62 %    % Monocytes 9 %    % Eosinophils 1 %    % Basophils 0 %    % Blasts 3 %    Absolute Neutrophils 0.8 (L) 1.6 - 8.3 10e3/uL    Absolute Lymphocytes 2.0 0.8 - 5.3 10e3/uL    Absolute Monocytes 0.3 0.0 - 1.3 10e3/uL    Absolute Eosinophils 0.0 0.0 - 0.7 10e3/uL    Absolute Basophils 0.0 0.0 - 0.2 10e3/uL    Absolute Blasts 0.1 (H) <=0.0 10e3/uL    NRBCs per 100 WBC 8 %    Absolute NRBCs 0.3 10e3/uL    RBC Morphology Confirmed RBC Indices     Platelet Assessment  Automated Count Confirmed. Platelet morphology is normal.     Automated Count Confirmed. Platelet morphology is normal.    Polychromasia Slight (A) None Seen    Narrative    Differential done on Albumin treated blood due to Smudge Cells.

## 2025-03-03 ENCOUNTER — LAB (OUTPATIENT)
Dept: LAB | Facility: CLINIC | Age: 60
End: 2025-03-03
Attending: STUDENT IN AN ORGANIZED HEALTH CARE EDUCATION/TRAINING PROGRAM
Payer: MEDICARE

## 2025-03-03 DIAGNOSIS — D46.9 MDS (MYELODYSPLASTIC SYNDROME) (H): Primary | ICD-10-CM

## 2025-03-03 LAB
A*LOCUS SEROLOGIC EQUIVALENT: 24
A*SEROLOGIC EQUIVALENT: 26
ABTEST METHOD: NORMAL
ALBUMIN SERPL BCG-MCNC: 4 G/DL (ref 3.5–5.2)
ALP SERPL-CCNC: 176 U/L (ref 40–150)
ALT SERPL W P-5'-P-CCNC: 21 U/L (ref 0–50)
ANION GAP SERPL CALCULATED.3IONS-SCNC: 12 MMOL/L (ref 7–15)
APTT PPP: 31 SECONDS (ref 22–38)
AST SERPL W P-5'-P-CCNC: 28 U/L (ref 0–45)
B*LOCUS SEROLOGIC EQUIVALENT: 62
B*SEROLOGIC EQUIVALENT: 38
BASOPHILS # BLD MANUAL: 0 10E3/UL (ref 0–0.2)
BASOPHILS NFR BLD MANUAL: 0 %
BILIRUB SERPL-MCNC: 0.4 MG/DL
BLASTS # BLD MANUAL: 0 10E3/UL
BLASTS NFR BLD MANUAL: 1 %
BUN SERPL-MCNC: 15.6 MG/DL (ref 6–20)
BW-1: NORMAL
BW-2: NORMAL
C*LOCUS SEROLOGIC EQUIVALENT: 10
C*SEROLOGIC EQUIVALENT: 12
CALCIUM SERPL-MCNC: 9.8 MG/DL (ref 8.8–10.4)
CHLORIDE SERPL-SCNC: 97 MMOL/L (ref 98–107)
CREAT SERPL-MCNC: 0.69 MG/DL (ref 0.51–0.95)
DQB1*LOCUS SEROLOGIC EQUIVALENT: 8
DQB1*SEROLOGIC EQUIVALENT: 5
DRB1*LOCUS SEROLOGIC EQUIVALENT: 4
DRB1*SEROLOGIC EQUIVALENT: 14
DRB3*LOCUS SEROLOGIC EQUIVALENT: 52
DRB4*SEROLOGIC EQUIVALENT: 53
DRSSO TEST METHOD: NORMAL
EGFRCR SERPLBLD CKD-EPI 2021: >90 ML/MIN/1.73M2
ELLIPTOCYTES BLD QL SMEAR: SLIGHT
EOSINOPHIL # BLD MANUAL: 0 10E3/UL (ref 0–0.7)
EOSINOPHIL NFR BLD MANUAL: 0 %
ERYTHROCYTE [DISTWIDTH] IN BLOOD BY AUTOMATED COUNT: 15.1 % (ref 10–15)
FIBRINOGEN PPP-MCNC: 804 MG/DL (ref 170–510)
FRAGMENTS BLD QL SMEAR: SLIGHT
GIANT PLATELETS BLD QL SMEAR: SLIGHT
GLUCOSE SERPL-MCNC: 115 MG/DL (ref 70–99)
HCO3 SERPL-SCNC: 24 MMOL/L (ref 22–29)
HCT VFR BLD AUTO: 23.5 % (ref 35–47)
HGB BLD-MCNC: 7.7 G/DL (ref 11.7–15.7)
HLA-A HIGH RES: NORMAL
HLA-A HIGH RES: NORMAL
HLA-B HIGH RES: NORMAL
HLA-B HIGH RES: NORMAL
HLA-CW HIGH RES: NORMAL
HLA-CW HIGH RES: NORMAL
HLA-DPA1 HIGH RES: NORMAL
HLA-DPB1 HIGH RES: NORMAL
HLA-DQA1 HIGH RES: NORMAL
HLA-DQA1 HIGH RES: NORMAL
HLA-DQB1 HIGH RES: NORMAL
HLA-DQB1 HIGH RES: NORMAL
HLA-DRB1 HIGH RES: NORMAL
HLA-DRB1 HIGH RES: NORMAL
HLA-DRB3 HIGH RES: NORMAL
HLA-DRB4 HIGH RES: NORMAL
INR PPP: 0.98 (ref 0.85–1.15)
LDH SERPL L TO P-CCNC: 385 U/L (ref 0–250)
LYMPHOCYTES # BLD MANUAL: 2.7 10E3/UL (ref 0.8–5.3)
LYMPHOCYTES NFR BLD MANUAL: 74 %
MAGNESIUM SERPL-MCNC: 2.5 MG/DL (ref 1.7–2.3)
MCH RBC QN AUTO: 37.9 PG (ref 26.5–33)
MCHC RBC AUTO-ENTMCNC: 32.8 G/DL (ref 31.5–36.5)
MCV RBC AUTO: 116 FL (ref 78–100)
MONOCYTES # BLD MANUAL: 0.5 10E3/UL (ref 0–1.3)
MONOCYTES NFR BLD MANUAL: 13 %
NEUTROPHILS # BLD MANUAL: 0.4 10E3/UL (ref 1.6–8.3)
NEUTROPHILS NFR BLD MANUAL: 11 %
NRBC # BLD AUTO: 0.4 10E3/UL
NRBC BLD MANUAL-RTO: 10 %
PHOSPHATE SERPL-MCNC: 4 MG/DL (ref 2.5–4.5)
PLAT MORPH BLD: ABNORMAL
PLATELET # BLD AUTO: 149 10E3/UL (ref 150–450)
POLYCHROMASIA BLD QL SMEAR: SLIGHT
POTASSIUM SERPL-SCNC: 4.5 MMOL/L (ref 3.4–5.3)
PROMYELOCYTES # BLD MANUAL: 0 10E3/UL
PROMYELOCYTES NFR BLD MANUAL: 1 %
PROT SERPL-MCNC: 8.1 G/DL (ref 6.4–8.3)
RBC # BLD AUTO: 2.03 10E6/UL (ref 3.8–5.2)
RBC MORPH BLD: ABNORMAL
SODIUM SERPL-SCNC: 133 MMOL/L (ref 135–145)
TARGETS BLD QL SMEAR: SLIGHT
URATE SERPL-MCNC: 2.4 MG/DL (ref 2.4–5.7)
VARIANT LYMPHS BLD QL SMEAR: PRESENT
WBC # BLD AUTO: 3.6 10E3/UL (ref 4–11)

## 2025-03-03 PROCEDURE — 250N000009 HC RX 250: Performed by: INTERNAL MEDICINE

## 2025-03-03 PROCEDURE — 258N000003 HC RX IP 258 OP 636

## 2025-03-03 PROCEDURE — 83615 LACTATE (LD) (LDH) ENZYME: CPT

## 2025-03-03 PROCEDURE — 250N000013 HC RX MED GY IP 250 OP 250 PS 637: Performed by: PHYSICIAN ASSISTANT

## 2025-03-03 PROCEDURE — 85007 BL SMEAR W/DIFF WBC COUNT: CPT

## 2025-03-03 PROCEDURE — 85730 THROMBOPLASTIN TIME PARTIAL: CPT

## 2025-03-03 PROCEDURE — 250N000013 HC RX MED GY IP 250 OP 250 PS 637

## 2025-03-03 PROCEDURE — 84100 ASSAY OF PHOSPHORUS: CPT

## 2025-03-03 PROCEDURE — 120N000002 HC R&B MED SURG/OB UMMC

## 2025-03-03 PROCEDURE — 85610 PROTHROMBIN TIME: CPT

## 2025-03-03 PROCEDURE — 250N000011 HC RX IP 250 OP 636

## 2025-03-03 PROCEDURE — 85018 HEMOGLOBIN: CPT

## 2025-03-03 PROCEDURE — 83735 ASSAY OF MAGNESIUM: CPT

## 2025-03-03 PROCEDURE — 82947 ASSAY GLUCOSE BLOOD QUANT: CPT

## 2025-03-03 PROCEDURE — 99418 PROLNG IP/OBS E/M EA 15 MIN: CPT | Performed by: STUDENT IN AN ORGANIZED HEALTH CARE EDUCATION/TRAINING PROGRAM

## 2025-03-03 PROCEDURE — 84520 ASSAY OF UREA NITROGEN: CPT

## 2025-03-03 PROCEDURE — 88321 CONSLTJ&REPRT SLD PREP ELSWR: CPT | Performed by: STUDENT IN AN ORGANIZED HEALTH CARE EDUCATION/TRAINING PROGRAM

## 2025-03-03 PROCEDURE — 36415 COLL VENOUS BLD VENIPUNCTURE: CPT

## 2025-03-03 PROCEDURE — 99233 SBSQ HOSP IP/OBS HIGH 50: CPT | Mod: 24 | Performed by: STUDENT IN AN ORGANIZED HEALTH CARE EDUCATION/TRAINING PROGRAM

## 2025-03-03 PROCEDURE — 85384 FIBRINOGEN ACTIVITY: CPT

## 2025-03-03 PROCEDURE — 84550 ASSAY OF BLOOD/URIC ACID: CPT

## 2025-03-03 RX ORDER — LEVOFLOXACIN 750 MG/1
750 TABLET, FILM COATED ORAL DAILY
Status: DISCONTINUED | OUTPATIENT
Start: 2025-03-04 | End: 2025-03-04

## 2025-03-03 RX ORDER — OFLOXACIN 3 MG/ML
5 SOLUTION AURICULAR (OTIC) DAILY
Status: COMPLETED | OUTPATIENT
Start: 2025-03-03 | End: 2025-03-07

## 2025-03-03 RX ADMIN — PAROXETINE HYDROCHLORIDE 20 MG: 20 TABLET, FILM COATED ORAL at 07:20

## 2025-03-03 RX ADMIN — FLUTICASONE FUROATE AND VILANTEROL TRIFENATATE 1 PUFF: 100; 25 POWDER RESPIRATORY (INHALATION) at 07:21

## 2025-03-03 RX ADMIN — ACETAMINOPHEN 650 MG: 325 TABLET, FILM COATED ORAL at 23:50

## 2025-03-03 RX ADMIN — FLUTICASONE PROPIONATE 2 SPRAY: 50 SPRAY, METERED NASAL at 07:21

## 2025-03-03 RX ADMIN — PROCHLORPERAZINE MALEATE 5 MG: 5 TABLET ORAL at 16:21

## 2025-03-03 RX ADMIN — Medication 1000 UNITS: at 07:20

## 2025-03-03 RX ADMIN — ENOXAPARIN SODIUM 40 MG: 40 INJECTION SUBCUTANEOUS at 20:01

## 2025-03-03 RX ADMIN — MICAFUNGIN SODIUM 50 MG: 50 INJECTION, POWDER, LYOPHILIZED, FOR SOLUTION INTRAVENOUS at 20:01

## 2025-03-03 RX ADMIN — ALLOPURINOL 300 MG: 300 TABLET ORAL at 07:20

## 2025-03-03 RX ADMIN — ACYCLOVIR 400 MG: 400 TABLET ORAL at 07:20

## 2025-03-03 RX ADMIN — OFLOXACIN 5 DROP: 3 SOLUTION AURICULAR (OTIC) at 22:24

## 2025-03-03 RX ADMIN — FAMOTIDINE 10 MG: 10 TABLET ORAL at 20:01

## 2025-03-03 RX ADMIN — ACETAMINOPHEN 650 MG: 325 TABLET, FILM COATED ORAL at 14:40

## 2025-03-03 RX ADMIN — OSELTAMIVIR PHOSPHATE 75 MG: 75 CAPSULE ORAL at 07:20

## 2025-03-03 RX ADMIN — FAMOTIDINE 10 MG: 10 TABLET ORAL at 07:20

## 2025-03-03 RX ADMIN — LISINOPRIL 10 MG: 10 TABLET ORAL at 07:20

## 2025-03-03 RX ADMIN — LEVOFLOXACIN 500 MG: 500 TABLET, FILM COATED ORAL at 07:20

## 2025-03-03 RX ADMIN — GABAPENTIN 400 MG: 300 CAPSULE ORAL at 07:20

## 2025-03-03 RX ADMIN — ACYCLOVIR 400 MG: 400 TABLET ORAL at 20:01

## 2025-03-03 ASSESSMENT — ACTIVITIES OF DAILY LIVING (ADL)
ADLS_ACUITY_SCORE: 31

## 2025-03-03 NOTE — PROGRESS NOTES
[facility]    Hematology / Oncology Progress Note  Hospital Day # 5  Date of Service (when I saw the patient): 03/03/2025     Assessment & Plan   Alejandra Villegas is a 59 year old female who presents with PMHx of COPD, migraines, rheumatoid arthritis, and anxiety. She was being followed at outside hematology/oncology clinic for cytopenias. Completed Bmbx at OSH 2/13/24 showing hypercellular marrow w/ 4% blasts and NPM1, IDH2, and NRAS mutations. She was admitted to [facility] 2/26/25 for further workup and management. Course has been complicated by neutropenic fevers, influenza A.    Today:  - Slides requested to be sent to [facility] x2/26 for review for diagnosis confirmation. Confirmed received at [facility] x3/3. Patient currently remains clinically stable with WBC stable, no evidence of TLS, minimal peripheral blasts. Further treatment decisions pending based on review and further d/w patient/family.    - Patient noted B/L ear pain, L>R s/p drainage from R side. Noted h/o recurrent AOM. On exam, TMs appear full with erythema to outer edges, patient noted pain w/ exam though able to tolerate. Small TM perforation noted to L side, will consult ENT for further assessment and recs. C/w levaquin 500 mg daily.   - Remains afebrile since 2/26. Complete Tamiflu (2/26-3/3) for Influenza A today. BCx2 (2/26) NGTD.   - Continue with best supportive cares.    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 Hgb 12. Was referred to local hematology/oncology clinic for further evaluation and seen by Dr. Harris. Bmbx 2/10/25 done at OSH showed hypercellular marrow w/ trilineage hematopoiesis w/ dyspoiesis with mildly elevated blasts of 4% on morphology. NGS w/ NPM1, IDH2, and NRAS mutations. She was admitted to [facility] 2/26/25 for further workup and management.   -  Ordered baseline echo w/ EF 60-65%, mild known aortic stenosis. EKG NSR.   - Ordered baseline viral serologies on admission: HepB/HepC, HIV negative, HSV 1/2, CMV, EBV, IgG positive.   - Sent HLA Typing BMT complete on admission. Will require inpatient BMT NT consult at some point during hospital stay; message sent to notify BMT team x2/27.   - Requested and confirmed request of OSH BMBx slides on admission (2/26) and notified [facility] special heme lab to expedite review as new diagnosis.  D/w outside lab facility again x2/27 and 2/28 regarding shipment slides and treatment decisions pending [facility] review.  - Further treatment decisions forthcoming on the basis of final BMBx results and discussions with patient/family; however, anticipate starting induction chemotherapy within the next 1-2 days. Will defer PICC placement at this time until definitive treatment decisions are made.      # Panycytopenia  Secondary to underlying hematologic malignancy.  - Follow daily CBC with differential  - Transfuse to keep Hgb >7, plt >10K  - Blood consent obtained 2/26/25 on admission and placed in patients paper chart.      # Risk for TLS  Secondary to hematologic malignancy, no evidence of TLS on admission.   - Allopurinol 300 mg daily   - Trend TLS labs daily for now; can increase frequency PRN with treatment initiation     # Risk for DIC  Secondary to underlying hematologic malignancy.  - Follow daily coags  - Transfuse cryo to keep fibrinogen >100, FFP to keep INR <1.8     ID  # ID prophylaxis  Acyclovir 400 mg BID  Levaquin 500 mg daily, restarted x3/1  Micafungin 50 mg daily  - PLC placed on admission for posa/vori coverage; vori w/ $1.60 copay, posa requiring PA.      # Neutropenic fever, resolved  # (+) Influenza A  # Rhinitis  On admission, patient noted subjective fever with chills and rhinitis 2/25 PM prior to admission. She denies other infectious symptoms such as headaches, new/worsening shortness of breath or cough beyond  baseline w/ COPD hx, diarrhea, abdominal pain/cramping. Afebrile and otherwise HDS on admission; basic ID workup completed w/ RVP/COVID/Influenza ordered.  Febrile to 100.7 PM 2/26. (+) Influenza A. Started cefepime and Tamiflu 75 mg BID x 5 days. COVID/RVP otherwise negative. CXR w/ streaky opacities, no consolidations. UA negative. BCx2 obtained.   - BCx2 (2/26): NGTD  - C/w Tamiflu 75 mg BID x5 days (2/26-3/3)  - Stop cefepime (2/26-3/1) given no recurrent fevers.     # c/f AOM   # Small L TM perforation  Patient noted B/L ear pain, L>R s/p drainage from R side on 3/3. Noted h/o recurrent AOMs. On exam, TMs appear full with erythema to outer edges, patient noted pain w/ exam though able to tolerate. Small TM perforation noted to L side. Query if perforation from recurrent AOM or 2/2 congestion and fluid from recent influenza. Will consult ENT for further assessment and recs. C/w levaquin 500 mg daily.      GI/  # Nausea/Vomiting, resolved  Patient noted ongoing nausea w/ occasional vomiting starting Dec 2024. Has been managed with prn zofran.  Nausea on admission, now resolved.   - PRN zofran and compazine     # Urinary frequency, resolved  # Dysuria, resolved  On admission, patient noted longstanding history of urinary frequency though new mild dysuria. Denies hematuria, bladder tenderness of CVA tenderness  - UA (2/26) negative, UC with no growth.     # GERD  Noted h/o on admission, noted symptoms increasing leading up to admission  - Start famotidine 10 mg BID     PULM  #COPD  Longstanding history of COPD. On admission patient reports symptoms are manageable and no recent exacerbations.   - Continue PTA Breo Ellipta inhaler and prn DuoNebs      CV  # Essential hypertension  Continue PTA lisinopril     # Mixed hyperlipidemia  Lipid panel have remained at goal, 1/14/25 panel w/ cholesterol 163, , LDL 92, HDL 45. - Held PTA atorvastatin on admission.     # H/o aortic stenosis  Patient noted on admission  "longstanding history of aortic stenosis. Prechemo echo w/ EF 60-65% and mild aortic stenosis and insufficiency. No previous echo to compare.      RENAL  # H/o CKD, stage 2  Baseline Cr ~ 0.7. On admission Cr 0.77.  - Continue to trend on daily labs and encourage PO hydration      NEURO  # Degenerative disc disease, L5-S1  Continue PTA gabapentin     # Chronic migraine w/o aura  Patient noted has been chronic since she was a child. Triggered by \"cracking her neck the wrong way\" and managed with prn regimen below along with resting in a dimly lit room.   - Continue PTA prn sumatriptan and prn cyclobenzaprine     ENDO  #Prediabetes  Last A1c 6.0 x12/9/24. Has been managing with lifestyle modifications.      MISC  # Rheumatoid arthritis   Patient reported trying treatment though was unable to tolerate well. Though notes manages w/ tylenol prn. RF completed at OSH 2/10 > 650. JI negative.       # BROOKS  # MDD  # Coping with new diagnosis  Patient reports good control of anxiety/depressive symptoms prior to admission. Did note feeling overwhelmed by new diagnosis. On admission discussed inpatient services such as health psychology or cordelia, she respectfully declined though will consider.   - Readdress health psych consult as indicated  - Continue PTA paroxetine      # Vit D Deficiency  Continue PTA vit D supplement     # Tobacco use - Patient reported starting at age 16 and has averaged 1.5 ppd up until about Jan 2025 where she has decreased use to about 1/2 ppd. She has continued to go outside to smoke on admission, discussed that in the setting of a new diagnosis and anticipated start of chemotherapy coming days though is understandably stressful, that continuing to smoke would precipitate additional complications or concerns.  She expressed understanding, planning for further smoking cessation with starting chemo. Offered NRT on admission and 2/28 that she has respectfully declined.   - Readdress NRT as indicated. "     Fluids/Electrolytes/Nutrition   - IVF prn   - PRN lyte replacement per standing protocol  - Regular diet as tolerated     Lines: PIV    PPX  VTE: Lovenox, hold for plts < 50k  GI: Famotidine 10 mg BID  Bowels: prn senna and miralax  Activity: as tolerated    Code: DNR/DNI    Medically Ready for Discharge: Anticipated in 5+ Days    Disposition: Admitted for further workup and management. Discharge pending treatment decisions and clinical course.   Follow-up: Will need to request APPT18 closer to discharge date and determine primary oncologist.     I spent 50 minutes face-to-face and/or coordinating or discussing care plan. Over 50% of our time on the unit was spent counseling the patient and coordinating care    Patient is seen and examined by Dr. Collins and I.  Assessment and plan are discussed and delivered to the patient.    Chelsie Murphy PA-C  Hematology/Oncology  Pager #[phone]      Interval History   Overnight notes reviewed. Afebrile and HDS. No acute events overnight.     Alejandra is resting in bed, feeling okay. Noted flu-related symptoms have improved over the weekend. Feeling less body aches/fatigue. She expressed feeling overwhelmed and frustrated this morning regarding admission and waiting for [employer] slide review. [place] reassured after discussing indications for admission and close monitoring as disease could progress quickly and cause clinical decompensation. Patient noted B/L ear pain, L>R s/p drainage from R side. Agreeable to ENT evaluation. She otherwise denies fever, chills, mouth sores, SOB, cough, abdominal pain, diarrhea, constipation, nausea, vomiting, numbness, tingling, swelling. Denies further questions or concerns at this time.     Complete and Comprehensive review of systems review and negative other than noted here or in the HPI.     Physical Exam   Temp: 97.7  F (36.5  C) Temp src: Oral BP: 114/73 Pulse: 91   Resp: 20 SpO2: 97 % O2 Device: None (Room air)    Vitals:    02/28/25  "0808 03/01/25 0753 03/03/25 0727   Weight: 80.1 kg (176 lb 8 oz) 80 kg (176 lb 4.8 oz) 80.2 kg (176 lb 12.8 oz)     Vital Signs with Ranges  Temp:  [97.5  F (36.4  C)-98  F (36.7  C)] 97.7  F (36.5  C)  Pulse:  [78-91] 91  Resp:  [18-22] 20  BP: (106-118)/(59-76) 114/73  SpO2:  [93 %-98 %] 97 %  I/O last 3 completed shifts:  In: 1760 [P.O.:1760]  Out: 1500 [Urine:1500]      Physical Exam:    Blood pressure 114/73, pulse 91, temperature 97.7  F (36.5  C), temperature source Oral, resp. rate 20, height 1.626 m (5' 4\"), weight 80.2 kg (176 lb 12.8 oz), last menstrual period 01/01/2007, SpO2 97%, not currently breastfeeding.    Constitutional: Awake and conversational. Non-toxic appearing. No acute distress.   HEENT: Normocephalic, atraumatic. Sclerae anicteric. Moist mucus membranes without lesions, abscess, or thrush.  TMs appear full with erythema to outer edges, patient noted pain w/ exam though able to tolerate. Small TM perforation noted to L side.  Respiratory: Breathing comfortably on room air with no accessory muscle use. Speaking in full sentences, no evidence of respiratory distress. Lungs CTAB, no wheeze or rales.   Cardiovascular: Regular rate and rhythm. Soft systolic murmur consistent with known aortic stenosis.  Trace peripheral edema.    GI: Abdomen with normoactive bowel sounds, soft, non-distended, and non-tender throughout. No rebound or guarding.   Skin: Skin is clean, dry, intact. No jaundice or significant rashes appreciated.   Neurologic: Alert and oriented with normal speech. Memory and thought process preserved. Moves all extremities spontaneously.   Neuropsychiatric: Calm, appropriate mood and affect congruent to situation. Good judgment and insight.   Vascular access: PIV. CDI. No evidence of erythema or inflammation.       Medications   Current Facility-Administered Medications   Medication Dose Route Frequency Provider Last Rate Last Admin     Current Facility-Administered Medications "   Medication Dose Route Frequency Provider Last Rate Last Admin    acyclovir (ZOVIRAX) tablet 400 mg  400 mg Oral BID Chelsie Murphy PA-C   400 mg at 03/03/25 0720    allopurinol (ZYLOPRIM) tablet 300 mg  300 mg Oral Daily Chelsie Murphy PA-C   300 mg at 03/03/25 0720    enoxaparin ANTICOAGULANT (LOVENOX) injection 40 mg  40 mg Subcutaneous Q24H Chelsie Murphy PA-C   40 mg at 03/02/25 2016    famotidine (PEPCID) tablet 10 mg  10 mg Oral BID Chlesie Murphy PA-C   10 mg at 03/03/25 0720    fluticasone-vilanterol (BREO ELLIPTA) 100-25 MCG/ACT inhaler 1 puff  1 puff Inhalation Daily Chelsie Murphy PA-C   1 puff at 03/03/25 0721    levofloxacin (LEVAQUIN) tablet 500 mg  500 mg Oral Daily Jyoti Tenorio PA-C   500 mg at 03/03/25 0720    lisinopril (ZESTRIL) tablet 10 mg  10 mg Oral Daily Jyoti Tenorio PA-C   10 mg at 03/03/25 0720    micafungin (MYCAMINE) 50 mg in sodium chloride 0.9 % 100 mL intermittent infusion  50 mg Intravenous Q24H Chelsie Murphy PA-C 100 mL/hr at 03/02/25 2047 50 mg at 03/02/25 2047    nicotine (NICODERM CQ) 14 MG/24HR 24 hr patch 1 patch  1 patch Transdermal Daily Chelsie Murphy PA-C   1 patch at 02/28/25 1641    PARoxetine (PAXIL) tablet 20 mg  20 mg Oral QAM Chelsie Murphy PA-C   20 mg at 03/03/25 0720    sodium chloride (PF) 0.9% PF flush 3 mL  3 mL Intracatheter Q8H Chelsie Murphy PA-C   3 mL at 03/03/25 0722    Vitamin D3 (CHOLECALCIFEROL) tablet 1,000 Units  1,000 Units Oral Daily Chelsie Murphy PA-C   1,000 Units at 03/03/25 0720       Data   Results for orders placed or performed during the hospital encounter of 02/26/25 (from the past 24 hours)   CBC with platelets differential    Narrative    The following orders were created for panel order CBC with platelets differential.  Procedure                               Abnormality         Status                      ---------                               -----------         ------                     CBC with platelets and d...[[id number]]  Abnormal            Final result               Manual Differential[[id number]]          Abnormal            Final result                 Please view results for these tests on the individual orders.   Comprehensive metabolic panel   Result Value Ref Range    Sodium 133 (L) 135 - 145 mmol/L    Potassium 4.5 3.4 - 5.3 mmol/L    Carbon Dioxide (CO2) 24 22 - 29 mmol/L    Anion Gap 12 7 - 15 mmol/L    Urea Nitrogen 15.6 6.0 - 20.0 mg/dL    Creatinine 0.69 0.51 - 0.95 mg/dL    GFR Estimate >90 >60 mL/min/1.73m2    Calcium 9.8 8.8 - 10.4 mg/dL    Chloride 97 (L) 98 - 107 mmol/L    Glucose 115 (H) 70 - 99 mg/dL    Alkaline Phosphatase 176 (H) 40 - 150 U/L    AST 28 0 - 45 U/L    ALT 21 0 - 50 U/L    Protein Total 8.1 6.4 - 8.3 g/dL    Albumin 4.0 3.5 - 5.2 g/dL    Bilirubin Total 0.4 <=1.2 mg/dL   INR   Result Value Ref Range    INR 0.98 0.85 - 1.15   Fibrinogen activity   Result Value Ref Range    Fibrinogen Activity 804 (H) 170 - 510 mg/dL   Partial thromboplastin time   Result Value Ref Range    aPTT 31 22 - 38 Seconds   Uric acid   Result Value Ref Range    Uric Acid 2.4 2.4 - 5.7 mg/dL   Magnesium   Result Value Ref Range    Magnesium 2.5 (H) 1.7 - 2.3 mg/dL   Phosphorus   Result Value Ref Range    Phosphorus 4.0 2.5 - 4.5 mg/dL   Lactate Dehydrogenase (aka LDH)   Result Value Ref Range    Lactate Dehydrogenase 385 (H) 0 - 250 U/L   CBC with platelets and differential   Result Value Ref Range    WBC Count 3.6 (L) 4.0 - 11.0 10e3/uL    RBC Count 2.03 (L) 3.80 - 5.20 10e6/uL    Hemoglobin 7.7 (L) 11.7 - 15.7 g/dL    Hematocrit 23.5 (L) 35.0 - 47.0 %     (H) 78 - 100 fL    MCH 37.9 (H) 26.5 - 33.0 pg    MCHC 32.8 31.5 - 36.5 g/dL    RDW 15.1 (H) 10.0 - 15.0 %    Platelet Count 149 (L) 150 - 450 10e3/uL   Manual Differential   Result Value Ref Range    % Neutrophils 11 %    % Lymphocytes 74  %    % Monocytes 13 %    % Eosinophils 0 %    % Basophils 0 %    % Promyelocytes 1 %    % Blasts 1 %    NRBCs per 100 WBC 10 (H) <=0 %    Absolute Neutrophils 0.4 (LL) 1.6 - 8.3 10e3/uL    Absolute Lymphocytes 2.7 0.8 - 5.3 10e3/uL    Absolute Monocytes 0.5 0.0 - 1.3 10e3/uL    Absolute Eosinophils 0.0 0.0 - 0.7 10e3/uL    Absolute Basophils 0.0 0.0 - 0.2 10e3/uL    Absolute Promyelocytes 0.0 <=0.0 10e3/uL    Absolute Blasts 0.0 <=0.0 10e3/uL    Absolute NRBCs 0.4 (H) <=0.0 10e3/uL    RBC Morphology Confirmed RBC Indices     Platelet Assessment (A) Automated Count Confirmed. Platelet morphology is normal.     Automated Count Confirmed. Giant platelets are present.    Elliptocytes Slight (A) None Seen    RBC Fragments Slight (A) None Seen    Polychromasia Slight (A) None Seen    Reactive Lymphocytes Present (A) None Seen    Target Cells Slight (A) None Seen    Giant Platelets Slight (A) None Seen

## 2025-03-03 NOTE — PLAN OF CARE
"Goal Outcome Evaluation:      Plan of Care Reviewed With: patient    Overall Patient Progress: no changeOverall Patient Progress: no change       Time    /75 (BP Location: Right arm)   Pulse 83   Temp 97.8  F (36.6  C) (Oral)   Resp 20   Ht 1.626 m (5' 4\")   Wt 80 kg (176 lb 4.8 oz)   LMP 01/01/2007 (Approximate)   SpO2 98%   BMI 30.26 kg/m      Activity: UAL  Pain: Pt denies any pain nausea and vomiting  Neuro: A&O X4  Cardiac: WNL  Respiratory: WNL  GI/: Voiding urine spontaneously and passing gas  Diet: Regular diet  Lines: PIV    Continue to monitor and follow POC      "

## 2025-03-03 NOTE — PLAN OF CARE
"Goal Outcome Evaluation:      Plan of Care Reviewed With: patient    Overall Patient Progress: no changeOverall Patient Progress: no change    Outcome Evaluation: Hospital Day 5; awaiting final BmBx results from OSH. Patient to be discussed at Tumor Board on Tuesday 3/4, likely chemo Wed 3/5.    Patient started the day quite frustrated about being in the hospital while waiting for final BmBx results from BmBx done at OSH. Referring MD from OSH told her \"you'll just be there a few days, get chemo & go home.\" RN explained to patient why that is not the case & please be patient while we wait for final results. Per Primary Team, patient to be discussed at Tumor Board tomorrow afternoon. Chemo to likely start on Wed 3/5. Patient outside to smoke frequently & continues to refuse Nicotine patch. Tylenol given for general body aches.  "

## 2025-03-03 NOTE — CONSULTS
Otolaryngology Consult Note  March 3, 2025      CC: bilateral ear pain, R ear drainage, L perf  Consult by: Chelsie Murphy PA-C      HPI: Alejandra Villegas is a 60 yo F w/ PMHx of COPD, migraines, rheumatoid arthritis, anxiety, and long hx of recurrent otitis media who was followed at outside hematology/oncology clinic for cytopenias s/p bone marrow biopsy at OSH 2/13/24 showing hypercellular marrow w/ 4% blasts and NPM1, IDH2, and NRAS mutations. She was admitted to [facility] 2/26/25 for further workup and management. Course has been complicated by neutropenic fevers, influenza A (undergoing full course of Tamiflu 2/26-3/3).    Over the weekend she started to notice bilateral ear pain and fullness associated with right sided ear drainage. The pain on the right has since resolved. However, left remains painful w/ no associated drainage. Does have some dizziness and intermittent vertigo while straining which she says she experiences every time she has ear infections. Last infxn was in 11/2024. Otherwise has had many rounds of infxns requiring abx including azithromycin and keflex at least ~once/year. Denies subjective changes to hearing.    Has been on levaquin for ID prophylaxis since 3/2AM.    ROS: 12 point review of systems is negative unless noted in HPI.    PMH:  Past Medical History:   Diagnosis Date    Allergic rhinitis 09/27/2011         Disorder of kidney and ureter 08/08/2008    Kidney disease GFR 87    Dorsalgia     No Comments Provided    Generalized anxiety disorder     No Comments Provided    Hidradenitis suppurativa     No Comments Provided    Other disorders of lung (CODE) 08/01/2007    Pulmonary nodules, stable on follow-up chest CT 12/08.  No further imaging recommended.    Other specified disorders of Eustachian tube, unspecified ear 02/27/2012         Personal history of other medical treatment (CODE)     Childbirth x4    Rheumatoid arthritis (H) 02/27/2012         Tobacco use     No  Comments Provided       PSH:  PE tubes x1 in 2006  No other hx of HN surgery  Past Surgical History:   Procedure Laterality Date    ARTHROSCOPY KNEE  05/2017    Dr Torres    ARTHROSCOPY KNEE  07/20/2017    Dr Garza, lateral release, meniscal repair    ARTHROTOMY WRIST Left 2011    Dr. Torres    BONE MARROW BIOPSY, BONE SPECIMEN, NEEDLE/TROCAR Left 2/13/2025    Procedure: Bone marrow biopsy, bone specimen, needle/trocar;  Surgeon: Hema Mari MD;  Location:  OR    CHOLECYSTECTOMY  06/2007    Emergency tracheotomy following failed intubation for laparoscopic cholecystectomy.    DILATION AND CURETTAGE  09/2006         HERNIA REPAIR  2007    Incisoinal hernia repair    HYSTERECTOMY TOTAL ABDOMINAL  02/2010         IMPLANT STIMULATOR AND LEADS SACRAL NERVE (STAGE ONE AND TWO) N/A 12/11/2018    Procedure: Interstim Battery Replacement;  Surgeon: Rl Ambriz MD;  Location:  OR    INTERSTIM DEVICE STAGE 2  01/06/2014    Dr. Ambriz [facility]    LAPAROSCOPIC ABLATION ENDOMETRIOSIS  09/2006         OOPHORECTOMY Left 2010     Dr Thorpe    RECONSTRUCT FOREFOOT WITH METATARSOPHALANGEAL (MTP) FUSION Right 5/5/2022    Procedure: Right fibular sesmoid excision and 1st metatarsal phalangeal joint fusion;  Surgeon: Rl Nathan DPM;  Location: [facility] OR    RELEASE CARPAL TUNNEL  02/02/2017         RELEASE PLANTAR FASCIA Left 12/19/2024    Procedure: excision of soft tissue mass left foot,  gastroc recession;  Surgeon: Rl Nathan DPM;  Location:  OR    REMOVE HARDWARE FOOT Right 8/10/2023    Procedure: REMOVAL, HARDWARE, FOOT;  Surgeon: Rl Nathan DPM;  Location: [facility] OR    SALPINGO OOPHORECTOMY,R/L/KELSEY Right 06/1999    Right salpingo-oophorectomy for hemorrhagic corpus luteum cyst    TRACHEOSTOMY  2007    emergency following failed intubation during cholecystectomy    TYMPANOSTOMY, LOCAL/TOPICAL ANESTHESIA  08/2006    PE tube placement       Meds:  No current outpatient medications on file.       Allergies:      Allergies   Allergen Reactions    Adhesive Tape     Bee Pollen Swelling     Seasonal    Bee Venom Unknown    Folic Acid Itching    Pollen Extract      Seasonal    Amoxicillin Rash    Liquid Adhesive Rash    Meloxicam Rash    Nabumetone GI Disturbance     GI upset       Social hx:  Social History     Socioeconomic History    Marital status:      Spouse name: Not on file    Number of children: Not on file    Years of education: Not on file    Highest education level: Not on file   Occupational History    Not on file   Tobacco Use    Smoking status: Every Day     Current packs/day: 1.00     Average packs/day: 1 pack/day for 44.2 years (44.2 ttl pk-yrs)     Types: Cigarettes     Start date: 1981     Passive exposure: Past    Smokeless tobacco: Never    Tobacco comments:     Passive exposure in childhood and adult homes and some work places   Vaping Use    Vaping status: Never Used   Substance and Sexual Activity    Alcohol use: No     Alcohol/week: 0.0 standard drinks of alcohol    Drug use: No    Sexual activity: Not Currently     Partners: Male   Other Topics Concern    Parent/sibling w/ CABG, MI or angioplasty before 65F 55M? Not Asked   Social History Narrative     four times and now . She is unemployed.  Lives with her oldest son(he lives with her).  4 sons, 1 son lives with her others are all in the area.     Social Drivers of Health     Financial Resource Strain: Low Risk  (2/26/2025)    Financial Resource Strain     Within the past 12 months, have you or your family members you live with been unable to get utilities (heat, electricity) when it was really needed?: No   Food Insecurity: Low Risk  (2/26/2025)    Food Insecurity     Within the past 12 months, did you worry that your food would run out before you got money to buy more?: No     Within the past 12 months, did the food you bought just not last and you didn t have money to get more?: No   Transportation Needs: Low Risk   (2/26/2025)    Transportation Needs     Within the past 12 months, has lack of transportation kept you from medical appointments, getting your medicines, non-medical meetings or appointments, work, or from getting things that you need?: No   Physical Activity: Not on file   Stress: Not on file   Social Connections: Not on file   Interpersonal Safety: Low Risk  (2/26/2025)    Interpersonal Safety     Do you feel physically and emotionally safe where you currently live?: Yes     Within the past 12 months, have you been hit, slapped, kicked or otherwise physically hurt by someone?: No     Within the past 12 months, have you been humiliated or emotionally abused in other ways by your partner or ex-partner?: No   Recent Concern: Interpersonal Safety - High Risk (2/13/2025)    Interpersonal Safety     Do you feel physically and emotionally safe where you currently live?: No     Within the past 12 months, have you been hit, slapped, kicked or otherwise physically hurt by someone?: No     Within the past 12 months, have you been humiliated or emotionally abused in other ways by your partner or ex-partner?: No   Housing Stability: Low Risk  (2/26/2025)    Housing Stability     Do you have housing? : Yes     Are you worried about losing your housing?: No       Family hx:  Family History   Problem Relation Age of Onset    Arthritis Mother         Arthritis,Rheumatoid    Cancer Mother         Cancer, lung and uterine cancer    Heart Disease Father         Heart Disease,CAD, CABG, peripheral vascular dise    Thyroid Disease Father         Thyroid Disease, hyperlipidemia    Other - See Comments Father         djd    Asthma Brother         Asthma    Asthma Sister         Asthma    Other - See Comments Sister         Psychiatric illness,Anxiety    Other - See Comments Son         x2 back surgeries    Breast Cancer No family hx of         Cancer-breast         PHYSICAL EXAM:  General: laying in bed, no acute distress  /63 (BP  "Location: Right arm)   Pulse 90   Temp 97.6  F (36.4  C) (Oral)   Resp 20   Ht 1.626 m (5' 4\")   Wt 80.2 kg (176 lb 12.8 oz)   LMP 01/01/2007 (Approximate)   SpO2 96%   BMI 30.35 kg/m    HEAD: normocephalic, atraumatic  Face: symmetrical, CN VII intact bilaterally (HB 1), no swelling, edema, or erythema. Sensation V1-V3 intact and equal bilaterally.   Eyes: EOMI without spontaneous or gaze evoked nystagmus, PERRL, clear sclera  Ears: no tragal tenderness  R EAC w/ dry cerumen and no erythema or otorrhea; TM intact w/ purulent effusion   L EAC clear w/ no erythema or otorrhea; TM w/ ~15% posterior inferior perf  Nose: no anterior drainage, intact and midline septum  Mouth: moist, no jaw or tooth tenderness, tongue midline and symmetric  Oropharynx: tonsils within normal limits, uvula midline, no oropharyngeal erythema  Neck: no LAD, trachea midline  Neuro: cranial nerves 2-12 grossly intact  Respiratory: breathing non-labored on RA, no stridor  Skin: no rashes or skin lesions of the face/neck  Psych: pleasant affect  Cardio: extremities warm and well perfused       ROUTINE IP LABS (Last four results)  BMP  Recent Labs   Lab 03/03/25  0637 03/02/25  0633 03/01/25  0627 02/28/25  0850   * 137 136 134*   POTASSIUM 4.5 3.9 4.3 4.5   CHLORIDE 97* 100 102 102   TOBIN 9.8 8.8 8.8 8.8   CO2 24 23 24 22   BUN 15.6 15.7 15.3 13.9   CR 0.69 0.64 0.70 0.70   * 163* 161* 115*     CBC  Recent Labs   Lab 03/03/25  0637 03/02/25  0633 03/01/25  0627 02/28/25  0850   WBC 3.6* 3.2* 3.4* 3.8*   RBC 2.03* 2.06* 2.11* 2.26*   HGB 7.7* 8.2* 8.2* 8.6*   HCT 23.5* 24.0* 23.3* 25.7*   * 117* 110* 114*   MCH 37.9* 39.8* 38.9* 38.1*   MCHC 32.8 34.2 35.2 33.5   RDW 15.1* 15.4* 16.3* 16.7*   * 140* 129* 123*     INR  Recent Labs   Lab 03/03/25  0637 03/02/25  0633 03/01/25  0627 02/28/25  0850   INR 0.98 1.05 1.06 1.10       Imaging:  None relevant    Assessment and Plan  Alejandra Villegas is a 60 yo F w/ PMHx " of COPD, migraines, rheumatoid arthritis, anxiety, and long hx of recurrent otitis media admitted to heme/onc for induction chemotherapy following bone marrow biopsy 2/26 demonstrating hypercellular marrow w/ trilineage hematopoiesis w/ dyspoiesis with mildly elevated blasts of 4% on morphology 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 Ear symptoms likely in the setting of active right otitis media with purulent effusion and left tympanic membrane perforation (of unknown duration) likely in setting of recent influenza infection.     -Recommend adding 7 day course of Augmentin for coverage of most common otitis media organisms   -Recommend 5 day course of floxin drops to left ear for perforation - 5 drops daily  -Follow up w/ PCP for left TM perforation - if persistent in 6-8 weeks recommend output ENT referral      Patient seen with maria luisa Hummel/w [facility] and staff Attending Dr. Bean.    Chelsie Vance MD  Otolaryngology-Head & Neck Surgery  Please page ENT with questions by dialing [phone] and entering job code [phone] when prompted.

## 2025-03-04 PROBLEM — C92.00 ACUTE MYELOID LEUKEMIA NOT HAVING ACHIEVED REMISSION (H): Status: ACTIVE | Noted: 2025-03-04

## 2025-03-04 LAB
ABO + RH BLD: NORMAL
ALBUMIN SERPL BCG-MCNC: 3.8 G/DL (ref 3.5–5.2)
ALP SERPL-CCNC: 154 U/L (ref 40–150)
ALT SERPL W P-5'-P-CCNC: 19 U/L (ref 0–50)
ANION GAP SERPL CALCULATED.3IONS-SCNC: 12 MMOL/L (ref 7–15)
APTT PPP: 30 SECONDS (ref 22–38)
AST SERPL W P-5'-P-CCNC: 29 U/L (ref 0–45)
BACTERIA BLD CULT: NO GROWTH
BACTERIA BLD CULT: NO GROWTH
BASOPHILS # BLD MANUAL: 0 10E3/UL (ref 0–0.2)
BASOPHILS NFR BLD MANUAL: 0 %
BILIRUB SERPL-MCNC: 0.3 MG/DL
BLASTS # BLD MANUAL: 0.1 10E3/UL
BLASTS NFR BLD MANUAL: 4 %
BLD GP AB SCN SERPL QL: NEGATIVE
BUN SERPL-MCNC: 18 MG/DL (ref 6–20)
CALCIUM SERPL-MCNC: 9.5 MG/DL (ref 8.8–10.4)
CHLORIDE SERPL-SCNC: 102 MMOL/L (ref 98–107)
CREAT SERPL-MCNC: 0.71 MG/DL (ref 0.51–0.95)
EGFRCR SERPLBLD CKD-EPI 2021: >90 ML/MIN/1.73M2
EOSINOPHIL # BLD MANUAL: 0 10E3/UL (ref 0–0.7)
EOSINOPHIL NFR BLD MANUAL: 1 %
ERYTHROCYTE [DISTWIDTH] IN BLOOD BY AUTOMATED COUNT: 15.1 % (ref 10–15)
FIBRINOGEN PPP-MCNC: 694 MG/DL (ref 170–510)
GLUCOSE SERPL-MCNC: 104 MG/DL (ref 70–99)
HCO3 SERPL-SCNC: 25 MMOL/L (ref 22–29)
HCT VFR BLD AUTO: 25.1 % (ref 35–47)
HGB BLD-MCNC: 8.4 G/DL (ref 11.7–15.7)
INR PPP: 1.06 (ref 0.85–1.15)
LDH SERPL L TO P-CCNC: 373 U/L (ref 0–250)
LYMPHOCYTES # BLD MANUAL: 2.9 10E3/UL (ref 0.8–5.3)
LYMPHOCYTES NFR BLD MANUAL: 77 %
MAGNESIUM SERPL-MCNC: 2.5 MG/DL (ref 1.7–2.3)
MCH RBC QN AUTO: 38.4 PG (ref 26.5–33)
MCHC RBC AUTO-ENTMCNC: 33.5 G/DL (ref 31.5–36.5)
MCV RBC AUTO: 115 FL (ref 78–100)
MONOCYTES # BLD MANUAL: 0.4 10E3/UL (ref 0–1.3)
MONOCYTES NFR BLD MANUAL: 10 %
NEUTROPHILS # BLD MANUAL: 0.3 10E3/UL (ref 1.6–8.3)
NEUTROPHILS NFR BLD MANUAL: 9 %
NRBC # BLD AUTO: 0.2 10E3/UL
NRBC BLD MANUAL-RTO: 4 %
PATH REPORT.COMMENTS IMP SPEC: ABNORMAL
PATH REPORT.COMMENTS IMP SPEC: YES
PATH REPORT.FINAL DX SPEC: ABNORMAL
PATH REPORT.GROSS SPEC: ABNORMAL
PATH REPORT.MICROSCOPIC SPEC OTHER STN: ABNORMAL
PATH REPORT.RELEVANT HX SPEC: ABNORMAL
PATH REPORT.RELEVANT HX SPEC: ABNORMAL
PATH REPORT.SITE OF ORIGIN SPEC: ABNORMAL
PHOSPHATE SERPL-MCNC: 3.8 MG/DL (ref 2.5–4.5)
PHOSPHATE SERPL-MCNC: 4.6 MG/DL (ref 2.5–4.5)
PLAT MORPH BLD: ABNORMAL
PLAT MORPH BLD: ABNORMAL
PLATELET # BLD AUTO: 171 10E3/UL (ref 150–450)
POLYCHROMASIA BLD QL SMEAR: SLIGHT
POLYCHROMASIA BLD QL SMEAR: SLIGHT
POTASSIUM SERPL-SCNC: 4.4 MMOL/L (ref 3.4–5.3)
PROT SERPL-MCNC: 7.3 G/DL (ref 6.4–8.3)
RBC # BLD AUTO: 2.19 10E6/UL (ref 3.8–5.2)
RBC MORPH BLD: ABNORMAL
RBC MORPH BLD: ABNORMAL
SODIUM SERPL-SCNC: 139 MMOL/L (ref 135–145)
SPECIMEN EXP DATE BLD: NORMAL
URATE SERPL-MCNC: 2.8 MG/DL (ref 2.4–5.7)
URATE SERPL-MCNC: 2.9 MG/DL (ref 2.4–5.7)
WBC # BLD AUTO: 3.8 10E3/UL (ref 4–11)

## 2025-03-04 PROCEDURE — 83735 ASSAY OF MAGNESIUM: CPT

## 2025-03-04 PROCEDURE — 36415 COLL VENOUS BLD VENIPUNCTURE: CPT

## 2025-03-04 PROCEDURE — 85007 BL SMEAR W/DIFF WBC COUNT: CPT

## 2025-03-04 PROCEDURE — 99418 PROLNG IP/OBS E/M EA 15 MIN: CPT | Performed by: STUDENT IN AN ORGANIZED HEALTH CARE EDUCATION/TRAINING PROGRAM

## 2025-03-04 PROCEDURE — 250N000011 HC RX IP 250 OP 636

## 2025-03-04 PROCEDURE — 120N000002 HC R&B MED SURG/OB UMMC

## 2025-03-04 PROCEDURE — 85384 FIBRINOGEN ACTIVITY: CPT

## 2025-03-04 PROCEDURE — 84550 ASSAY OF BLOOD/URIC ACID: CPT

## 2025-03-04 PROCEDURE — 84100 ASSAY OF PHOSPHORUS: CPT

## 2025-03-04 PROCEDURE — 80053 COMPREHEN METABOLIC PANEL: CPT

## 2025-03-04 PROCEDURE — 83615 LACTATE (LD) (LDH) ENZYME: CPT

## 2025-03-04 PROCEDURE — 250N000013 HC RX MED GY IP 250 OP 250 PS 637: Performed by: INTERNAL MEDICINE

## 2025-03-04 PROCEDURE — 85041 AUTOMATED RBC COUNT: CPT

## 2025-03-04 PROCEDURE — 85610 PROTHROMBIN TIME: CPT

## 2025-03-04 PROCEDURE — 250N000013 HC RX MED GY IP 250 OP 250 PS 637

## 2025-03-04 PROCEDURE — 250N000013 HC RX MED GY IP 250 OP 250 PS 637: Performed by: PHYSICIAN ASSISTANT

## 2025-03-04 PROCEDURE — 86900 BLOOD TYPING SEROLOGIC ABO: CPT

## 2025-03-04 PROCEDURE — 85014 HEMATOCRIT: CPT

## 2025-03-04 PROCEDURE — 99233 SBSQ HOSP IP/OBS HIGH 50: CPT | Mod: 24 | Performed by: STUDENT IN AN ORGANIZED HEALTH CARE EDUCATION/TRAINING PROGRAM

## 2025-03-04 PROCEDURE — 85730 THROMBOPLASTIN TIME PARTIAL: CPT

## 2025-03-04 PROCEDURE — 258N000003 HC RX IP 258 OP 636

## 2025-03-04 RX ORDER — LEVOFLOXACIN 750 MG/1
750 TABLET, FILM COATED ORAL DAILY
Status: COMPLETED | OUTPATIENT
Start: 2025-03-05 | End: 2025-03-10

## 2025-03-04 RX ORDER — LEVOFLOXACIN 500 MG/1
500 TABLET, FILM COATED ORAL DAILY
Status: DISCONTINUED | OUTPATIENT
Start: 2025-03-11 | End: 2025-03-24

## 2025-03-04 RX ADMIN — ACYCLOVIR 400 MG: 400 TABLET ORAL at 09:52

## 2025-03-04 RX ADMIN — FAMOTIDINE 10 MG: 10 TABLET ORAL at 20:38

## 2025-03-04 RX ADMIN — OFLOXACIN 5 DROP: 3 SOLUTION AURICULAR (OTIC) at 09:55

## 2025-03-04 RX ADMIN — ENOXAPARIN SODIUM 40 MG: 40 INJECTION SUBCUTANEOUS at 20:38

## 2025-03-04 RX ADMIN — ACETAMINOPHEN 650 MG: 325 TABLET, FILM COATED ORAL at 13:35

## 2025-03-04 RX ADMIN — PAROXETINE HYDROCHLORIDE 20 MG: 20 TABLET, FILM COATED ORAL at 09:53

## 2025-03-04 RX ADMIN — MICAFUNGIN SODIUM 50 MG: 50 INJECTION, POWDER, LYOPHILIZED, FOR SOLUTION INTRAVENOUS at 20:39

## 2025-03-04 RX ADMIN — ALLOPURINOL 300 MG: 300 TABLET ORAL at 09:52

## 2025-03-04 RX ADMIN — ACETAMINOPHEN 650 MG: 325 TABLET, FILM COATED ORAL at 20:38

## 2025-03-04 RX ADMIN — Medication 1000 UNITS: at 09:53

## 2025-03-04 RX ADMIN — LEVOFLOXACIN 750 MG: 750 TABLET, FILM COATED ORAL at 09:52

## 2025-03-04 RX ADMIN — LISINOPRIL 10 MG: 10 TABLET ORAL at 09:59

## 2025-03-04 RX ADMIN — ACYCLOVIR 400 MG: 400 TABLET ORAL at 20:38

## 2025-03-04 RX ADMIN — FLUTICASONE FUROATE AND VILANTEROL TRIFENATATE 1 PUFF: 100; 25 POWDER RESPIRATORY (INHALATION) at 09:52

## 2025-03-04 RX ADMIN — FAMOTIDINE 10 MG: 10 TABLET ORAL at 09:53

## 2025-03-04 ASSESSMENT — ACTIVITIES OF DAILY LIVING (ADL)
ADLS_ACUITY_SCORE: 31

## 2025-03-04 NOTE — PLAN OF CARE
Goal Outcome Evaluation:      Plan of Care Reviewed With: patient    Overall Patient Progress: no changeOverall Patient Progress: no change    Outcome Evaluation: Waiting for patient's BmBx results to be discussed at Tumor Board this afternoon    Patient anxiously awaiting results of today's Tumor Board discussion to determine what her diagnosis is & what chemo or treatment she will receive. No complaints of pain or nausea. Patient's left ear feels better now that drops & increased Levaquin dose started. Patient walked outside independently several times this shift to smoke. Patient continues to decline Nicotine patch.

## 2025-03-04 NOTE — PLAN OF CARE
"Goal Outcome Evaluation:      Plan of Care Reviewed With: patient    Overall Patient Progress: no changeOverall Patient Progress: no change     Time    /82 (BP Location: Right arm)   Pulse 72   Temp 97.9  F (36.6  C) (Oral)   Resp 20   Ht 1.626 m (5' 4\")   Wt 80.2 kg (176 lb 12.8 oz)   LMP 01/01/2007 (Approximate)   SpO2 94%   BMI 30.35 kg/m        Activity: UAL  Pt had some pain in the right thigh took prn tylenol and pt denied pain afterwards  Neuro: A&O X4  Cardiac: WNL  Respiratory: WL  GI/: Voiding urine spontaneously and passing gas  Diet: Regular diet  Lines: PIV         Continue to monitor and follow POC        "

## 2025-03-04 NOTE — PROGRESS NOTES
[facility] Medicine Cross Cover Note    March 3, 2025 7:30 PM    Followed up on ENT consult for perforated ear drum at the request of the day team.  Recommended floxin drops x5 days (ordered) and adding 7 day course of augmentin.  Patient is currently on levofloxacin 500mg daily as prophylaxis and has an amoxicillin allergy (noted as rash).  After reviewing with pharmacy, will continue levofloxacin at increased dose of 750mg daily as alternative treatment for otitis media.        Consuelo Robles MD on 3/3/2025 at 7:30 PM

## 2025-03-04 NOTE — PLAN OF CARE
"Shift: [id number]     Reason for admission: Anemia. Waiting for Bmbx results from OSH   Activity: UAL  Pain: Denies  Neuro: Alert and oriented x 4   Cardiac: WDL   Respiratory: WDL, on RA  GI: LBM 3/2  : Voids spontaneously  Diet: Regular  Lines: PIV    Shift Updates: Waiting for Bmbx results from OSH. Possible plan to start chemotherapy on Wednesday ENT consulted and saw pt at beside regarding ear pain/drainage.         Vital signs:  Temp: 97.8  F (36.6  C) Temp src: Oral BP: 111/69 Pulse: 85   Resp: 20 SpO2: 95 % O2 Device: None (Room air)   Height: 162.6 cm (5' 4\") Weight: 80.2 kg (176 lb 12.8 oz)  Estimated body mass index is 30.35 kg/m  as calculated from the following:    Height as of this encounter: 1.626 m (5' 4\").    Weight as of this encounter: 80.2 kg (176 lb 12.8 oz).      Goal Outcome Evaluation:      Plan of Care Reviewed With: patient    Overall Patient Progress: no changeOverall Patient Progress: no change    Outcome Evaluation: Waiting for Bmbx results from OSH. ENT consulted and saw pt at beside regarding her ear pain.      "

## 2025-03-04 NOTE — PROGRESS NOTES
[facility]    Hematology / Oncology Progress Note  Hospital Day # 6  Date of Service (when I saw the patient): 03/04/2025     Assessment & Plan   Alejandra Villegas is a 59 year old female who presents with PMHx of COPD, migraines, rheumatoid arthritis, and anxiety. She was being followed at outside hematology/oncology clinic for cytopenias. Completed Bmbx at OSH 2/13/24 showing hypercellular marrow w/ 4% blasts and NPM1, IDH2, and NRAS mutations on outside pathology review. She was admitted to [facility] 2/26/25 for further workup and management. Course has been complicated by neutropenic fevers, influenza A infection, and AOM.    Today:  - Plan for case discussion this afternoon with leukemia/lymphoma conference, pathology slides under review by [facility] pathology. Further treatment decisions pending based on review and further d/w patient/family.    - R AOM and L TM perforation, appreciate ENT recommendations.   - Levofloxacin 750 mg x7 days and 5-day course of floxin drops.  - Mild elevation in phos this morning, no other e/o TLS. Possibly dietary related.   - Monitor TLS labs BID for now.   - Continue with best supportive cares.    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 Hgb 12. Was referred to local hematology/oncology clinic for further evaluation and seen by Dr. Harris. Bmbx 2/10/25 done at OSH showed hypercellular marrow w/ trilineage hematopoiesis w/ dyspoiesis with mildly elevated blasts of 4% on morphology. NGS w/ NPM1, IDH2, and NRAS mutations. She was admitted to [facility] 2/26/25 for further workup and management.   - Baseline echo w/ EF 60-65%, mild known aortic stenosis. EKG NSR.   - CXR (2/26) with mildly increased streaky bibasilar opacities, atelectasis or edema. No consolidation.   - Baseline viral serologies: HSV 1/2, CMV,  EBV, IgG positive. Otherwise HepB/HepC, HIV negative.   - HLA Typing sent on admission. Message sent to notify BMT team x2/27 for IP consult.   - Requested and confirmed request of OSH BMBx slides on admission (2/26) and notified [facility] special heme lab to expedite review as new diagnosis.  D/w outside lab facility again x2/27 and 2/28 regarding shipment slides and treatment decisions pending [facility] review.  - Further treatment decisions forthcoming on the basis of final BMBx results and discussions with patient/family; however, anticipate starting induction chemotherapy within the next 1-2 days. Will defer PICC placement at this time until definitive treatment decisions are made.      # Panycytopenia  Secondary to underlying hematologic malignancy.  - Follow daily CBC with differential  - Transfuse to keep Hgb >7, plt >10K  - Blood consent obtained 2/26/25 on admission and placed in patients paper chart.      # Risk for TLS  # Hyperphosphatemia  Secondary to hematologic malignancy, no evidence of TLS on admission.   - Allopurinol 300 mg daily   - Phosphorus mildly elevated to 4.6 (3/4). No other e/o TLS with stable Cr, Ca2++, K+, and uric acid. Possibly in setting of early TLS or dietary related.   - Will increase TLS labs to BID today and monitor.    # Risk for DIC  Secondary to underlying hematologic malignancy.  - Follow daily coags  - Transfuse cryo to keep fibrinogen >100, FFP to keep INR <1.8     ID  # ID prophylaxis  - Acyclovir 400 mg BID  - Levaquin 500 mg daily, increased to 750 mg while covering AOM as below  - Micafungin 50 mg daily  - PLC placed on admission for posa/vori coverage; vori w/ $1.60 copay, posa requiring PA.      # Neutropenic fever, fever resolved  # (+) Influenza A  # Rhinitis  On admission, patient noted subjective fever with chills and rhinitis 2/25 PM prior to admission. She denies other infectious symptoms such as headaches, new/worsening shortness of breath or cough beyond baseline w/  COPD hx, diarrhea, abdominal pain/cramping. Afebrile and otherwise HDS on admission; basic ID workup completed w/ RVP/COVID/Influenza ordered.  Febrile to 100.7 PM 2/26. (+) Influenza A. Started cefepime and Tamiflu 75 mg BID x 5 days. COVID/RVP otherwise negative. CXR w/ streaky opacities, no consolidations. UA negative. BCx2 negative. Completed course of Tamiflu 75 mg BID x5 days (2/26-3/3). Also received course of IV Cefepime (2/26-3/1).     # R AOM with purulent effusion  # Small L TM perforation  Patient noted B/L ear pain, L>R s/p drainage from R side on 3/3. Noted h/o recurrent AOMs. On exam, TMs appear full with erythema to outer edges, patient noted pain w/ exam though able to tolerate. Small TM perforation noted to L side. Query if perforation from recurrent AOM or 2/2 congestion and fluid from recent influenza.  - ENT consulted, appreciate.   - Recommended 7-day course Augmentin (per cross-cover discussion with pharmacy, given h/o allergic reaction to Augmentin, increased dose of levofloxacin from 500 mg ? 750 mg daily x7-days.   - Recommended 5 day course of floxin drops to left ear for perforation - 5 drops daily   - Follow up w/ PCP for left TM perforation - if persistent in 6-8 weeks recommend outpt ENT referral.     GI/  # Nausea/Vomiting, recurrent/intermittent  Patient noted ongoing nausea w/ occasional vomiting starting Dec 2024. Has been managed with prn zofran.  Nausea on admission, now resolved.   - PRN zofran and compazine     # Urinary frequency, resolved  # Dysuria, resolved  On admission, patient noted longstanding history of urinary frequency though new mild dysuria. Denies hematuria, bladder tenderness of CVA tenderness. UA (2/26) negative, UC with no growth. Symptoms have since resolved.     # GERD  Noted h/o on admission, noted symptoms increasing leading up to admission  - Start famotidine 10 mg BID     PULM  #COPD  Longstanding history of COPD. On admission patient reports symptoms  "are manageable and no recent exacerbations.   - Continue PTA Breo Ellipta inhaler and prn DuoNebs      CV  # Essential hypertension  Continue PTA lisinopril     # Mixed hyperlipidemia  Lipid panel have remained at goal, 1/14/25 panel w/ cholesterol 163, , LDL 92, HDL 45. - Held PTA atorvastatin on admission.     # H/o aortic stenosis  Patient noted on admission longstanding history of aortic stenosis. Prechemo echo w/ EF 60-65% and mild aortic stenosis and insufficiency. No previous echo to compare.      RENAL  # H/o CKD, stage 2  Baseline Cr ~ 0.7. On admission Cr 0.77.  - Continue to trend on daily labs and encourage PO hydration     NEURO  # Degenerative disc disease, L5-S1  Continue PTA gabapentin     # Chronic migraine w/o aura  Patient noted has been chronic since she was a child. Triggered by \"cracking her neck the wrong way\" and managed with prn regimen below along with resting in a dimly lit room.   - Continue PTA prn sumatriptan and prn cyclobenzaprine     ENDO  #Prediabetes  Last A1c 6.0 x12/9/24. Has been managing with lifestyle modifications.      MISC  # Rheumatoid arthritis   Patient reported trying treatment though was unable to tolerate well. Though notes manages w/ tylenol prn. RF completed at OSH 2/10 > 650. JI negative.       # BROOKS  # MDD  # Coping with new diagnosis  Patient reports good control of anxiety/depressive symptoms prior to admission. Did note feeling overwhelmed by new diagnosis. On admission discussed inpatient services such as health psychology or cordelia, she respectfully declined though will consider.   - Readdress health psych consult as indicated  - Continue PTA paroxetine      # Vit D Deficiency  Continue PTA vit D supplement     # Tobacco use - Patient reported starting at age 16 and has averaged 1.5 ppd up until about Jan 2025 where she has decreased use to about 1/2 ppd. She has continued to go outside to smoke on admission, discussed that in the setting of a new " diagnosis and anticipated start of chemotherapy coming days though is understandably stressful, that continuing to smoke would precipitate additional complications or concerns.  She expressed understanding, planning for further smoking cessation with starting chemo. Offered NRT on admission and 2/28 that she has respectfully declined.   - Readdress NRT as indicated.     Fluids/Electrolytes/Nutrition   - IVF prn   - PRN lyte replacement per standing protocol  - Regular diet as tolerated     Lines: PIV    PPX  VTE: Lovenox, hold for plts < 50k  GI: Famotidine 10 mg BID  Bowels: prn senna and miralax  Activity: as tolerated    Code: DNR/DNI    Medically Ready for Discharge: Anticipated in 5+ Days    Disposition: Admitted for further workup and management. Discharge pending treatment decisions and clinical course.   Follow-up: Will need to request APPT18 closer to discharge date and determine primary oncologist.     I spent 50 minutes face-to-face and/or coordinating or discussing care plan. Over 50% of our time on the unit was spent counseling the patient and coordinating care    Staffed with Dr. Collins.    Zully Garcia PA-C  Hematology/Oncology  Pager: #[phone]     Interval History   Overnight notes reviewed. Afebrile and HDS. No acute events overnight.     Feeling well this morning, has had intermittent nausea in the last 24 hours, feeling better this morning.  Improvement in ear symptoms since starting the drops last night via ENT. Denies headache, vision changes, chest pain, palpitations, shortness of breath, abdominal pain or cramping, dysuria. Continues to move bowels.  We reviewed plan of care for today, plan for pathology review and case discussion at leukemia/lymphoma conference this afternoon.  All questions addressed.    Complete and Comprehensive review of systems review and negative other than noted here or in the HPI.     Physical Exam   Temp: 97.1  F (36.2  C) Temp src: Oral BP: 122/82 Pulse: 72    "Resp: 20 SpO2: 94 % O2 Device: None (Room air)    Vitals:    03/01/25 0753 03/03/25 0727 03/04/25 0915   Weight: 80 kg (176 lb 4.8 oz) 80.2 kg (176 lb 12.8 oz) 78.7 kg (173 lb 6.4 oz)     Vital Signs with Ranges  Temp:  [97.1  F (36.2  C)-97.9  F (36.6  C)] 97.1  F (36.2  C)  Pulse:  [72-90] 72  Resp:  [20] 20  BP: (105-122)/(63-84) 122/82  SpO2:  [94 %-97 %] 94 %  I/O last 3 completed shifts:  In: 4203 [P.O.:4198; I.V.:5]  Out: 2300 [Urine:2300]      Physical Exam:    Blood pressure 122/82, pulse 72, temperature 97.1  F (36.2  C), temperature source Oral, resp. rate 20, height 1.626 m (5' 4\"), weight 78.7 kg (173 lb 6.4 oz), last menstrual period 01/01/2007, SpO2 94%, not currently breastfeeding.    Constitutional: Awake and conversational. Non-toxic appearing. No acute distress.   HEENT: Normocephalic, atraumatic. Sclerae anicteric. Moist mucus membranes without lesions, abscess, or thrush.  Respiratory: Breathing comfortably on room air with no accessory muscle use. Speaking in full sentences, no evidence of respiratory distress. Lungs CTAB, no wheeze or rales.   Cardiovascular: Regular rate and rhythm. Soft systolic murmur consistent with known aortic stenosis.  No peripheral edema.    GI: Abdomen with normoactive bowel sounds, soft, non-distended, and non-tender throughout. No rebound or guarding.   Skin: Skin is clean, dry, intact. No jaundice or significant rashes appreciated.   Neurologic: Alert and oriented with normal speech. Memory and thought process preserved. Moves all extremities spontaneously.   Neuropsychiatric: Calm, appropriate mood and affect congruent to situation. Good judgment and insight.   Vascular access: PIV. CDI. No evidence of erythema or inflammation.       Medications   Current Facility-Administered Medications   Medication Dose Route Frequency Provider Last Rate Last Admin     Current Facility-Administered Medications   Medication Dose Route Frequency Provider Last Rate Last Admin    " acyclovir (ZOVIRAX) tablet 400 mg  400 mg Oral BID Chelsie Murphy PA-C   400 mg at 03/04/25 0952    allopurinol (ZYLOPRIM) tablet 300 mg  300 mg Oral Daily Chelsie Murphy PA-C   300 mg at 03/04/25 0952    enoxaparin ANTICOAGULANT (LOVENOX) injection 40 mg  40 mg Subcutaneous Q24H Chelsie Murphy PA-C   40 mg at 03/03/25 2001    famotidine (PEPCID) tablet 10 mg  10 mg Oral BID Chelsie Murphy PA-C   10 mg at 03/04/25 0953    fluticasone-vilanterol (BREO ELLIPTA) 100-25 MCG/ACT inhaler 1 puff  1 puff Inhalation Daily Chelsie Murphy PA-C   1 puff at 03/04/25 0952    levofloxacin (LEVAQUIN) tablet 750 mg  750 mg Oral Daily Consuelo Robles MD   750 mg at 03/04/25 0952    lisinopril (ZESTRIL) tablet 10 mg  10 mg Oral Daily Jyoti Tenorio PA-C   10 mg at 03/04/25 0959    micafungin (MYCAMINE) 50 mg in sodium chloride 0.9 % 100 mL intermittent infusion  50 mg Intravenous Q24H Chelsie Murphy PA-C 100 mL/hr at 03/03/25 2001 50 mg at 03/03/25 2001    nicotine (NICODERM CQ) 14 MG/24HR 24 hr patch 1 patch  1 patch Transdermal Daily Chelsie Murphy PA-C   1 patch at 02/28/25 1641    ofloxacin (FLOXIN) 0.3 % otic solution 5 drop  5 drop Left Ear Daily Consuelo Robles MD   5 drop at 03/04/25 0955    PARoxetine (PAXIL) tablet 20 mg  20 mg Oral QAM Chelsie Murphy PA-C   20 mg at 03/04/25 0953    sodium chloride (PF) 0.9% PF flush 3 mL  3 mL Intracatheter Q8H Chelsie Murphy PA-C   3 mL at 03/03/25 2919    Vitamin D3 (CHOLECALCIFEROL) tablet 1,000 Units  1,000 Units Oral Daily Chelsie Murphy PA-C   1,000 Units at 03/04/25 0953       Data   Results for orders placed or performed during the hospital encounter of 02/26/25 (from the past 24 hours)   CBC with platelets differential    Narrative    The following orders were created for panel order CBC with platelets differential.  Procedure                                Abnormality         Status                     ---------                               -----------         ------                     CBC with platelets and d...[[id number]]  Abnormal            Final result               RBC and Platelet Morphology[[id number]]  Abnormal            Final result               Manual Differential[[id number]]          Abnormal            Final result               Manual Differential[[id number]]                                                           Please view results for these tests on the individual orders.   ABO/Rh type and screen    Narrative    The following orders were created for panel order ABO/Rh type and screen.  Procedure                               Abnormality         Status                     ---------                               -----------         ------                     Adult Type and Screen[[id number]]                            Final result                 Please view results for these tests on the individual orders.   Comprehensive metabolic panel   Result Value Ref Range    Sodium 139 135 - 145 mmol/L    Potassium 4.4 3.4 - 5.3 mmol/L    Carbon Dioxide (CO2) 25 22 - 29 mmol/L    Anion Gap 12 7 - 15 mmol/L    Urea Nitrogen 18.0 6.0 - 20.0 mg/dL    Creatinine 0.71 0.51 - 0.95 mg/dL    GFR Estimate >90 >60 mL/min/1.73m2    Calcium 9.5 8.8 - 10.4 mg/dL    Chloride 102 98 - 107 mmol/L    Glucose 104 (H) 70 - 99 mg/dL    Alkaline Phosphatase 154 (H) 40 - 150 U/L    AST 29 0 - 45 U/L    ALT 19 0 - 50 U/L    Protein Total 7.3 6.4 - 8.3 g/dL    Albumin 3.8 3.5 - 5.2 g/dL    Bilirubin Total 0.3 <=1.2 mg/dL   INR   Result Value Ref Range    INR 1.06 0.85 - 1.15   Fibrinogen activity   Result Value Ref Range    Fibrinogen Activity 694 (H) 170 - 510 mg/dL   Partial thromboplastin time   Result Value Ref Range    aPTT 30 22 - 38 Seconds   Uric acid   Result Value Ref Range    Uric Acid 2.8 2.4 - 5.7 mg/dL   Magnesium   Result Value Ref Range    Magnesium 2.5  (H) 1.7 - 2.3 mg/dL   Phosphorus   Result Value Ref Range    Phosphorus 4.6 (H) 2.5 - 4.5 mg/dL   Lactate Dehydrogenase (aka LDH)   Result Value Ref Range    Lactate Dehydrogenase 373 (H) 0 - 250 U/L   CBC with platelets and differential   Result Value Ref Range    WBC Count 3.8 (L) 4.0 - 11.0 10e3/uL    RBC Count 2.19 (L) 3.80 - 5.20 10e6/uL    Hemoglobin 8.4 (L) 11.7 - 15.7 g/dL    Hematocrit 25.1 (L) 35.0 - 47.0 %     (H) 78 - 100 fL    MCH 38.4 (H) 26.5 - 33.0 pg    MCHC 33.5 31.5 - 36.5 g/dL    RDW 15.1 (H) 10.0 - 15.0 %    Platelet Count 171 150 - 450 10e3/uL   Adult Type and Screen   Result Value Ref Range    ABO/RH(D) A POS     Antibody Screen Negative Negative    SPECIMEN EXPIRATION DATE [id number]    RBC and Platelet Morphology   Result Value Ref Range    RBC Morphology Confirmed RBC Indices     Platelet Assessment  Automated Count Confirmed. Platelet morphology is normal.     Automated Count Confirmed. Platelet morphology is normal.    Polychromasia Slight (A) None Seen   Manual Differential   Result Value Ref Range    % Neutrophils 9 %    % Lymphocytes 77 %    % Monocytes 10 %    % Eosinophils 1 %    % Basophils 0 %    % Blasts 4 %    NRBCs per 100 WBC 4 (H) <=0 %    Absolute Neutrophils 0.3 (LL) 1.6 - 8.3 10e3/uL    Absolute Lymphocytes 2.9 0.8 - 5.3 10e3/uL    Absolute Monocytes 0.4 0.0 - 1.3 10e3/uL    Absolute Eosinophils 0.0 0.0 - 0.7 10e3/uL    Absolute Basophils 0.0 0.0 - 0.2 10e3/uL    Absolute Blasts 0.1 (H) <=0.0 10e3/uL    Absolute NRBCs 0.2 (H) <=0.0 10e3/uL    RBC Morphology Confirmed RBC Indices     Platelet Assessment  Automated Count Confirmed. Platelet morphology is normal.     Automated Count Confirmed. Platelet morphology is normal.    Polychromasia Slight (A) None Seen

## 2025-03-05 LAB
ALBUMIN SERPL BCG-MCNC: 3.9 G/DL (ref 3.5–5.2)
ALP SERPL-CCNC: 161 U/L (ref 40–150)
ALT SERPL W P-5'-P-CCNC: 21 U/L (ref 0–50)
ANION GAP SERPL CALCULATED.3IONS-SCNC: 13 MMOL/L (ref 7–15)
ANION GAP SERPL CALCULATED.3IONS-SCNC: 13 MMOL/L (ref 7–15)
APTT PPP: 29 SECONDS (ref 22–38)
AST SERPL W P-5'-P-CCNC: 32 U/L (ref 0–45)
BASOPHILS # BLD MANUAL: 0 10E3/UL (ref 0–0.2)
BASOPHILS NFR BLD MANUAL: 1 %
BILIRUB SERPL-MCNC: 0.4 MG/DL
BLASTS # BLD MANUAL: 0.2 10E3/UL
BLASTS NFR BLD MANUAL: 5 %
BUN SERPL-MCNC: 14.8 MG/DL (ref 6–20)
BUN SERPL-MCNC: 17.6 MG/DL (ref 6–20)
CALCIUM SERPL-MCNC: 10 MG/DL (ref 8.8–10.4)
CALCIUM SERPL-MCNC: 9.4 MG/DL (ref 8.8–10.4)
CHLORIDE SERPL-SCNC: 101 MMOL/L (ref 98–107)
CHLORIDE SERPL-SCNC: 97 MMOL/L (ref 98–107)
CREAT SERPL-MCNC: 0.75 MG/DL (ref 0.51–0.95)
CREAT SERPL-MCNC: 0.86 MG/DL (ref 0.51–0.95)
EGFRCR SERPLBLD CKD-EPI 2021: 78 ML/MIN/1.73M2
EGFRCR SERPLBLD CKD-EPI 2021: >90 ML/MIN/1.73M2
EOSINOPHIL # BLD MANUAL: 0 10E3/UL (ref 0–0.7)
EOSINOPHIL NFR BLD MANUAL: 1 %
ERYTHROCYTE [DISTWIDTH] IN BLOOD BY AUTOMATED COUNT: 15.2 % (ref 10–15)
FIBRINOGEN PPP-MCNC: 606 MG/DL (ref 170–510)
GLUCOSE SERPL-MCNC: 116 MG/DL (ref 70–99)
GLUCOSE SERPL-MCNC: 123 MG/DL (ref 70–99)
HCO3 SERPL-SCNC: 24 MMOL/L (ref 22–29)
HCO3 SERPL-SCNC: 25 MMOL/L (ref 22–29)
HCT VFR BLD AUTO: 24.5 % (ref 35–47)
HGB BLD-MCNC: 8.6 G/DL (ref 11.7–15.7)
INR PPP: 1.1 (ref 0.85–1.15)
LDH SERPL L TO P-CCNC: 410 U/L (ref 0–250)
LYMPHOCYTES # BLD MANUAL: 2.3 10E3/UL (ref 0.8–5.3)
LYMPHOCYTES NFR BLD MANUAL: 70 %
MAGNESIUM SERPL-MCNC: 2.5 MG/DL (ref 1.7–2.3)
MCH RBC QN AUTO: 38.4 PG (ref 26.5–33)
MCHC RBC AUTO-ENTMCNC: 35.1 G/DL (ref 31.5–36.5)
MCV RBC AUTO: 109 FL (ref 78–100)
MONOCYTES # BLD MANUAL: 0.3 10E3/UL (ref 0–1.3)
MONOCYTES NFR BLD MANUAL: 8 %
NEUTROPHILS # BLD MANUAL: 0.5 10E3/UL (ref 1.6–8.3)
NEUTROPHILS NFR BLD MANUAL: 15 %
NRBC # BLD AUTO: 0.2 10E3/UL
NRBC BLD MANUAL-RTO: 7 %
PHOSPHATE SERPL-MCNC: 4.3 MG/DL (ref 2.5–4.5)
PHOSPHATE SERPL-MCNC: 4.4 MG/DL (ref 2.5–4.5)
PLAT MORPH BLD: ABNORMAL
PLATELET # BLD AUTO: 166 10E3/UL (ref 150–450)
POTASSIUM SERPL-SCNC: 4.3 MMOL/L (ref 3.4–5.3)
POTASSIUM SERPL-SCNC: 4.3 MMOL/L (ref 3.4–5.3)
PROT SERPL-MCNC: 7.3 G/DL (ref 6.4–8.3)
RBC # BLD AUTO: 2.24 10E6/UL (ref 3.8–5.2)
RBC MORPH BLD: ABNORMAL
SODIUM SERPL-SCNC: 134 MMOL/L (ref 135–145)
SODIUM SERPL-SCNC: 139 MMOL/L (ref 135–145)
URATE SERPL-MCNC: 3 MG/DL (ref 2.4–5.7)
URATE SERPL-MCNC: 3.1 MG/DL (ref 2.4–5.7)
WBC # BLD AUTO: 3.3 10E3/UL (ref 4–11)

## 2025-03-05 PROCEDURE — 84100 ASSAY OF PHOSPHORUS: CPT

## 2025-03-05 PROCEDURE — 83615 LACTATE (LD) (LDH) ENZYME: CPT

## 2025-03-05 PROCEDURE — 82435 ASSAY OF BLOOD CHLORIDE: CPT

## 2025-03-05 PROCEDURE — 84450 TRANSFERASE (AST) (SGOT): CPT

## 2025-03-05 PROCEDURE — 81310 NPM1 GENE: CPT

## 2025-03-05 PROCEDURE — 272N000278 HC DEVICE 5FR SECURACATH

## 2025-03-05 PROCEDURE — 36415 COLL VENOUS BLD VENIPUNCTURE: CPT

## 2025-03-05 PROCEDURE — 250N000013 HC RX MED GY IP 250 OP 250 PS 637

## 2025-03-05 PROCEDURE — 84550 ASSAY OF BLOOD/URIC ACID: CPT

## 2025-03-05 PROCEDURE — 82565 ASSAY OF CREATININE: CPT

## 2025-03-05 PROCEDURE — 85014 HEMATOCRIT: CPT

## 2025-03-05 PROCEDURE — 85007 BL SMEAR W/DIFF WBC COUNT: CPT

## 2025-03-05 PROCEDURE — 83735 ASSAY OF MAGNESIUM: CPT

## 2025-03-05 PROCEDURE — 99418 PROLNG IP/OBS E/M EA 15 MIN: CPT | Performed by: INTERNAL MEDICINE

## 2025-03-05 PROCEDURE — 99233 SBSQ HOSP IP/OBS HIGH 50: CPT | Mod: FS | Performed by: INTERNAL MEDICINE

## 2025-03-05 PROCEDURE — 85730 THROMBOPLASTIN TIME PARTIAL: CPT

## 2025-03-05 PROCEDURE — 258N000003 HC RX IP 258 OP 636: Performed by: INTERNAL MEDICINE

## 2025-03-05 PROCEDURE — 36569 INSJ PICC 5 YR+ W/O IMAGING: CPT

## 2025-03-05 PROCEDURE — 272N000451 HC KIT SHRLOCK 5FR POWER PICC DOUBLE LUMEN

## 2025-03-05 PROCEDURE — 85610 PROTHROMBIN TIME: CPT

## 2025-03-05 PROCEDURE — 250N000013 HC RX MED GY IP 250 OP 250 PS 637: Performed by: PHYSICIAN ASSISTANT

## 2025-03-05 PROCEDURE — 250N000012 HC RX MED GY IP 250 OP 636 PS 637: Performed by: INTERNAL MEDICINE

## 2025-03-05 PROCEDURE — 82310 ASSAY OF CALCIUM: CPT

## 2025-03-05 PROCEDURE — 250N000009 HC RX 250

## 2025-03-05 PROCEDURE — 258N000003 HC RX IP 258 OP 636

## 2025-03-05 PROCEDURE — 120N000002 HC R&B MED SURG/OB UMMC

## 2025-03-05 PROCEDURE — 85384 FIBRINOGEN ACTIVITY: CPT

## 2025-03-05 PROCEDURE — 250N000011 HC RX IP 250 OP 636

## 2025-03-05 PROCEDURE — 80048 BASIC METABOLIC PNL TOTAL CA: CPT

## 2025-03-05 PROCEDURE — 250N000011 HC RX IP 250 OP 636: Performed by: INTERNAL MEDICINE

## 2025-03-05 RX ORDER — ALBUTEROL SULFATE 0.83 MG/ML
2.5 SOLUTION RESPIRATORY (INHALATION)
Status: ACTIVE | OUTPATIENT
Start: 2025-03-05 | End: 2025-03-12

## 2025-03-05 RX ORDER — HEPARIN SODIUM,PORCINE 10 UNIT/ML
5-20 VIAL (ML) INTRAVENOUS EVERY 24 HOURS
Status: DISCONTINUED | OUTPATIENT
Start: 2025-03-05 | End: 2025-04-06 | Stop reason: HOSPADM

## 2025-03-05 RX ORDER — LIDOCAINE 40 MG/G
CREAM TOPICAL
Status: ACTIVE | OUTPATIENT
Start: 2025-03-05 | End: 2025-03-08

## 2025-03-05 RX ORDER — DEXAMETHASONE 4 MG/1
12 TABLET ORAL EVERY 24 HOURS
Status: COMPLETED | OUTPATIENT
Start: 2025-03-05 | End: 2025-03-07

## 2025-03-05 RX ORDER — METHYLPREDNISOLONE SODIUM SUCCINATE 40 MG/ML
40 INJECTION INTRAMUSCULAR; INTRAVENOUS
Status: ACTIVE | OUTPATIENT
Start: 2025-03-05 | End: 2025-03-12

## 2025-03-05 RX ORDER — DIPHENHYDRAMINE HYDROCHLORIDE 50 MG/ML
50 INJECTION, SOLUTION INTRAMUSCULAR; INTRAVENOUS
Status: ACTIVE | OUTPATIENT
Start: 2025-03-05 | End: 2025-03-12

## 2025-03-05 RX ORDER — ALLOPURINOL 300 MG/1
300 TABLET ORAL DAILY
Status: DISCONTINUED | OUTPATIENT
Start: 2025-03-05 | End: 2025-03-05

## 2025-03-05 RX ORDER — PROCHLORPERAZINE MALEATE 10 MG
10 TABLET ORAL EVERY 6 HOURS PRN
Status: DISCONTINUED | OUTPATIENT
Start: 2025-03-05 | End: 2025-03-05

## 2025-03-05 RX ORDER — MEPERIDINE HYDROCHLORIDE 25 MG/ML
25 INJECTION INTRAMUSCULAR; INTRAVENOUS; SUBCUTANEOUS
Status: ACTIVE | OUTPATIENT
Start: 2025-03-05 | End: 2025-03-12

## 2025-03-05 RX ORDER — HEPARIN SODIUM,PORCINE 10 UNIT/ML
5-20 VIAL (ML) INTRAVENOUS EVERY 24 HOURS
Status: DISCONTINUED | OUTPATIENT
Start: 2025-03-05 | End: 2025-03-16

## 2025-03-05 RX ORDER — LORAZEPAM 0.5 MG/1
.5-1 TABLET ORAL EVERY 6 HOURS PRN
Status: DISCONTINUED | OUTPATIENT
Start: 2025-03-05 | End: 2025-04-06 | Stop reason: HOSPADM

## 2025-03-05 RX ORDER — HEPARIN SODIUM,PORCINE 10 UNIT/ML
5-20 VIAL (ML) INTRAVENOUS
Status: DISCONTINUED | OUTPATIENT
Start: 2025-03-05 | End: 2025-04-06 | Stop reason: HOSPADM

## 2025-03-05 RX ORDER — ALBUTEROL SULFATE 90 UG/1
1-2 INHALANT RESPIRATORY (INHALATION)
Status: ACTIVE | OUTPATIENT
Start: 2025-03-05 | End: 2025-03-12

## 2025-03-05 RX ORDER — LORAZEPAM 2 MG/ML
.5-1 INJECTION INTRAMUSCULAR EVERY 6 HOURS PRN
Status: DISCONTINUED | OUTPATIENT
Start: 2025-03-05 | End: 2025-04-06 | Stop reason: HOSPADM

## 2025-03-05 RX ORDER — LORATADINE 10 MG/1
10 TABLET ORAL DAILY
Status: DISCONTINUED | OUTPATIENT
Start: 2025-03-05 | End: 2025-04-06 | Stop reason: HOSPADM

## 2025-03-05 RX ORDER — ONDANSETRON 8 MG/1
8 TABLET, FILM COATED ORAL EVERY 12 HOURS
Status: COMPLETED | OUTPATIENT
Start: 2025-03-05 | End: 2025-03-12

## 2025-03-05 RX ORDER — EPINEPHRINE 1 MG/ML
0.3 INJECTION, SOLUTION, CONCENTRATE INTRAVENOUS EVERY 5 MIN PRN
Status: ACTIVE | OUTPATIENT
Start: 2025-03-05 | End: 2025-03-12

## 2025-03-05 RX ORDER — DIPHENHYDRAMINE HYDROCHLORIDE 50 MG/ML
25 INJECTION, SOLUTION INTRAMUSCULAR; INTRAVENOUS
Status: ACTIVE | OUTPATIENT
Start: 2025-03-05 | End: 2025-03-12

## 2025-03-05 RX ADMIN — ACETAMINOPHEN 650 MG: 325 TABLET, FILM COATED ORAL at 21:24

## 2025-03-05 RX ADMIN — ACYCLOVIR 400 MG: 400 TABLET ORAL at 09:29

## 2025-03-05 RX ADMIN — Medication 1000 UNITS: at 09:28

## 2025-03-05 RX ADMIN — PAROXETINE HYDROCHLORIDE 20 MG: 20 TABLET, FILM COATED ORAL at 09:25

## 2025-03-05 RX ADMIN — DEXAMETHASONE 12 MG: 4 TABLET ORAL at 20:13

## 2025-03-05 RX ADMIN — ACETAMINOPHEN 650 MG: 325 TABLET, FILM COATED ORAL at 12:58

## 2025-03-05 RX ADMIN — PROCHLORPERAZINE MALEATE 5 MG: 5 TABLET ORAL at 14:07

## 2025-03-05 RX ADMIN — DAUNORUBICIN HYDROCHLORIDE 113 MG: 5 INJECTION, SOLUTION INTRAVENOUS at 21:18

## 2025-03-05 RX ADMIN — ACYCLOVIR 400 MG: 400 TABLET ORAL at 20:12

## 2025-03-05 RX ADMIN — ONDANSETRON HYDROCHLORIDE 8 MG: 8 TABLET, FILM COATED ORAL at 20:13

## 2025-03-05 RX ADMIN — LORATADINE 10 MG: 10 TABLET ORAL at 20:12

## 2025-03-05 RX ADMIN — CYTARABINE 190 MG: 100 INJECTION, SOLUTION INTRATHECAL; INTRAVENOUS; SUBCUTANEOUS at 22:13

## 2025-03-05 RX ADMIN — ALLOPURINOL 300 MG: 300 TABLET ORAL at 09:29

## 2025-03-05 RX ADMIN — LISINOPRIL 10 MG: 10 TABLET ORAL at 09:29

## 2025-03-05 RX ADMIN — MICAFUNGIN SODIUM 50 MG: 50 INJECTION, POWDER, LYOPHILIZED, FOR SOLUTION INTRAVENOUS at 21:08

## 2025-03-05 RX ADMIN — FLUTICASONE FUROATE AND VILANTEROL TRIFENATATE 1 PUFF: 100; 25 POWDER RESPIRATORY (INHALATION) at 09:25

## 2025-03-05 RX ADMIN — ENOXAPARIN SODIUM 40 MG: 40 INJECTION SUBCUTANEOUS at 20:13

## 2025-03-05 RX ADMIN — FAMOTIDINE 10 MG: 10 TABLET ORAL at 20:13

## 2025-03-05 RX ADMIN — FAMOTIDINE 10 MG: 10 TABLET ORAL at 09:29

## 2025-03-05 RX ADMIN — LIDOCAINE HYDROCHLORIDE ANHYDROUS 2 ML: 10 INJECTION, SOLUTION INFILTRATION at 19:46

## 2025-03-05 RX ADMIN — LEVOFLOXACIN 750 MG: 750 TABLET, FILM COATED ORAL at 09:26

## 2025-03-05 RX ADMIN — OFLOXACIN 5 DROP: 3 SOLUTION AURICULAR (OTIC) at 09:30

## 2025-03-05 ASSESSMENT — ACTIVITIES OF DAILY LIVING (ADL)
ADLS_ACUITY_SCORE: 31
ADLS_ACUITY_SCORE: 35
ADLS_ACUITY_SCORE: 31
ADLS_ACUITY_SCORE: 35
ADLS_ACUITY_SCORE: 31

## 2025-03-05 NOTE — PLAN OF CARE
Goal Outcome Evaluation:      Plan of Care Reviewed With: patient    Overall Patient Progress: no changeOverall Patient Progress: no change    Outcome Evaluation: Patient likely to get PICC & start 7+3    Patient feeling well today; no complaints of pain or nausea. Patient said last evening's anxiety has subsided for the time being. Tylenol given x1 for general body aches, oral Compazine given x1 for nausea. RN educated patient on not being able to leave the floor once chemo starts & will be CIVI for 7 days; patient verbalized understanding. RN asked if patient wanted to start a Nicotine patch, gum, etc; patient politely declined. Patient stated she'll reconsider smoking cessation assistance once chemo starts. Family is at bedside & is supportive. Patient to potentially get PICC & start 7+3 this evening. TLS labs now BID.

## 2025-03-05 NOTE — PLAN OF CARE
"Goal Outcome Evaluation:      Plan of Care Reviewed With: patient    Overall Patient Progress: no change    Outcome Evaluation: Pt is awaiting discussion regarding possible treatments. Pt stated that she was anxious this AM since diagnosis was given, but verbalized acceptance and \"feels better\". Poor appetite per pt, emesis x1 this shift.    Status: Admitted to [facility] 2/26 for further workup and management after completing a BMBx from OSH (2/13). Showed hypercellular marrow w/ 4% blasts and NPM1, IDH2, and NRAS mutations. PMHx of COPD, migraines, rheumatoid arthritis, and anxiety. Pathology slides under review by [facility] pathology and plan for case discussion at Tumor Board this afternoon. Droplet precautions, + for Influenza A.   Neuros: A&Ox4. Able to make needs known. Denies n/t.   Vitals: VSS.   Respiratory: On RA. Denies SOB. Pt reported smoking 2-3 times this shift. Offered nicotine patches, but pt states that it does not work. Education on smoking cessation provided.   Labs: Phos and Uric acid redraw @1742. Phos - 3.8, Uric acid - 2.9. Pt is neutropenic (ANC 0.3).   GI: Bowel sounds + in all four quadrants. LBM 3/4. Pt reports poor appetite. Emesis x 1 this shift.   : Voiding spont. Adequate fluid intake.   Skin: Bruising on abdomen.   IV: PIV Sl'd.   Pain: Pain rated at 4 this evening (generalized aching pain in back and legs). PRN Tylenol given x1. After follow up, pt reported no pain.   Activity: Up ad vanda. Pt walked outside multiple times throughout shift.   Social: Pt in a pleasant mood this afternoon. Pt reported that she \"feels better\" than this AM after discussion of diagnosis.   Plan: Pt is awaiting discussion of treatment plan. Chemo to likely start 3/5. Continue with POC. Notify provider of any changes to pt status.     "

## 2025-03-05 NOTE — PROGRESS NOTES
[facility]    Hematology / Oncology Progress Note  Hospital Day # 7  Date of Service (when I saw the patient): 03/05/2025     Assessment & Plan   Alejandra Villegas is a 59 year old female who presents with PMHx of COPD, migraines, rheumatoid arthritis, and anxiety. She was being followed at outside hematology/oncology clinic for cytopenias. Completed Bmbx at OSH 2/13/24 showing hypercellular marrow w/ 4% blasts and NPM1, IDH2, and NRAS mutations on outside pathology review. She was admitted to [facility] 2/26/25 for further workup and management. [facility] pathology review w/ 8% blasts and NPM1, IDH2, and NRAS mutations on NGS consistent with AML diagnosis. Plan for induction chemotherapy with 7 + 3 PM 3/5/2025. Course has been complicated by neutropenic fevers, influenza A infection, and AOM.     Today:  - [facility] pathology review w/ 8% blasts and NPM1, IDH2, and NRAS mutations on NGS consistent with AML diagnosis. Plan for induction chemotherapy with 7 + 3 later this evening. PICC ordered.   - R AOM and L TM perforation, appreciate ENT recommendations. C/w levofloxacin 750 mg x7 days and 5-day course of floxin drops.  - Trend TLS labs BID.   - Continue with best supportive cares.    HEME  # AML, NPM1, IDH2, NRAS  Had been seen by PCP Dec 2024 w/ progressive fatigue and nausea where she was found leukopenic WBC  2.4 and neutropenic w/ ANC 0.3. Hgb 12. Was referred to local hematology/oncology clinic for further evaluation and seen by Dr. Harris. Bmbx 2/10/25 done at OSH showed hypercellular marrow w/ trilineage hematopoiesis w/ dyspoiesis with mildly elevated blasts of 4% on morphology. NGS w/ NPM1, IDH2, and NRAS mutations. She was admitted to [facility] 2/26/25 for further workup and management.   - Baseline echo w/ EF 60-65%, mild known aortic stenosis. EKG NSR.   - CXR (2/26) with mildly increased streaky bibasilar opacities, atelectasis or edema. No consolidation.   - Baseline viral  serologies: HSV 1/2, CMV, EBV, IgG positive. Otherwise HepB/HepC, HIV negative.   - HLA Typing sent on admission. Message sent to notify BMT team x2/27 for IP consult.   - Requested and confirmed request of OSH BMBx slides prior to admission (2/26) and notified [facility] special heme lab to expedite review as new diagnosis.  D/w outside lab facility again x2/27 and 2/28 regarding shipment slides and treatment decisions pending [facility] review.   - [facility] review showing hypercellular marrow with 8% blasts on morphology. NPM1, NRAS, and IDH2 mutations on NGS consistent with AML diagnosis. Per d/w on 2/4/2025 tumor board, will plan to proceed with inductio chemotherapy with 7 +3 .   - PICC placed 3/5/2025.        Treatment plan: 7 + 3 (C1D1 = 3/5/2025)     - Daunorubicin 60 mg/m  IV D1-3     - Cytarabine 100 mg/m  IV D1-7       - Pre-medications: zofran, dexamethasone 12 mg D1-3     # Panycytopenia  Secondary to underlying hematologic malignancy.  - Follow daily CBC with differential  - Transfuse to keep Hgb >7, plt >10K  - Blood consent obtained 2/26/25 on admission and placed in patients paper chart.      # Risk for TLS  # Hyperphosphatemia  Secondary to hematologic malignancy, no evidence of TLS on admission.   - Allopurinol 300 mg daily   - Phosphorus mildly elevated to 4.6 (3/4). No other e/o TLS with stable Cr, Ca2++, K+, and uric acid. Possibly in setting of early TLS or dietary related.   - TLS labs to BID today and monitor.  - If e/o SABRA, IVF bolus followed by gentle mIVF  - If phos > 5 start phoslo TID  - If uric acid > 8 give rasburicase 6 mg x1 dose  - Additional correction of electrolytes (K+) per standard protocols    # Risk for DIC  Secondary to underlying hematologic malignancy.  - Follow daily coags  - Transfuse cryo to keep fibrinogen >100, FFP to keep INR <1.8     ID  # ID prophylaxis  - Acyclovir 400 mg BID  - Levaquin 500 mg daily, increased to 750 mg while covering AOM as below  - Micafungin 50 mg daily  -  PLC placed on admission for posa/vori coverage; vori w/ $1.60 copay, posa requiring PA.      # Neutropenic fever, fever resolved  # (+) Influenza A  # Rhinitis  On admission, patient noted subjective fever with chills and rhinitis 2/25 PM prior to admission. She denies other infectious symptoms such as headaches, new/worsening shortness of breath or cough beyond baseline w/ COPD hx, diarrhea, abdominal pain/cramping. Afebrile and otherwise HDS on admission; basic ID workup completed w/ RVP/COVID/Influenza ordered.  Febrile to 100.7 PM 2/26. (+) Influenza A. Started cefepime and Tamiflu 75 mg BID x 5 days. COVID/RVP otherwise negative. CXR w/ streaky opacities, no consolidations. UA negative. BCx2 negative. Completed course of Tamiflu 75 mg BID x5 days (2/26-3/3). Also received course of IV Cefepime (2/26-3/1).     # R AOM with purulent effusion  # Small L TM perforation  Patient noted B/L ear pain, L>R s/p drainage from R side on 3/3. Noted h/o recurrent AOMs. On exam, TMs appear full with erythema to outer edges, patient noted pain w/ exam though able to tolerate. Small TM perforation noted to L side. Query if perforation from recurrent AOM or 2/2 congestion and fluid from recent influenza.  - ENT consulted, appreciate.   - Recommended 7-day course Augmentin (per cross-cover discussion with pharmacy, given h/o allergic reaction to Augmentin, increased dose of levofloxacin from 500 mg ? 750 mg daily x7-days.   - Recommended 5 day course of floxin drops to left ear for perforation - 5 drops daily   - Follow up w/ PCP for left TM perforation - if persistent in 6-8 weeks recommend outpt ENT referral.     GI/  # Nausea/Vomiting, recurrent/intermittent  Patient noted ongoing nausea w/ occasional vomiting starting Dec 2024. Has been managed with prn zofran.  Nausea on admission, now resolved.   - PRN zofran and compazine     # Urinary frequency, resolved  # Dysuria, resolved  On admission, patient noted longstanding  "history of urinary frequency though new mild dysuria. Denies hematuria, bladder tenderness of CVA tenderness. UA (2/26) negative, UC with no growth. Symptoms have since resolved.     # GERD  Noted h/o on admission, noted symptoms increasing leading up to admission  - Start famotidine 10 mg BID     PULM  #COPD  Longstanding history of COPD. On admission patient reports symptoms are manageable and no recent exacerbations.   - Continue PTA Breo Ellipta inhaler and prn DuoNebs      CV  # Essential hypertension  Continue PTA lisinopril     # Mixed hyperlipidemia  Lipid panel have remained at goal, 1/14/25 panel w/ cholesterol 163, , LDL 92, HDL 45. - Held PTA atorvastatin on admission.     # H/o aortic stenosis  Patient noted on admission longstanding history of aortic stenosis. Prechemo echo w/ EF 60-65% and mild aortic stenosis and insufficiency. No previous echo to compare.      RENAL  # H/o CKD, stage 2  Baseline Cr ~ 0.7. On admission Cr 0.77.  - Continue to trend on daily labs and encourage PO hydration     NEURO  # Degenerative disc disease, L5-S1  Continue PTA gabapentin     # Chronic migraine w/o aura  Patient noted has been chronic since she was a child. Triggered by \"cracking her neck the wrong way\" and managed with prn regimen below along with resting in a dimly lit room.   - Continue PTA prn sumatriptan and prn cyclobenzaprine     ENDO  #Prediabetes  Last A1c 6.0 x12/9/24. Has been managing with lifestyle modifications.      MISC  # Rheumatoid arthritis   Patient reported trying treatment though was unable to tolerate well. Though notes manages w/ tylenol prn. RF completed at OSH 2/10 > 650. JI negative.       # BROOKS  # MDD  # Coping with new diagnosis  Patient reports good control of anxiety/depressive symptoms prior to admission. Did note feeling overwhelmed by new diagnosis. On admission discussed inpatient services such as health psychology or cordelia, she respectfully declined though will " consider.   - Readdress health psych consult as indicated  - Continue PTA paroxetine      # Vit D Deficiency  Continue PTA vit D supplement    # Seasonal allergies - claritin 10 mg daily     # Tobacco use - Patient reported starting at age 16 and has averaged 1.5 ppd up until about Jan 2025 where she has decreased use to about 1/2 ppd. She has continued to go outside to smoke on admission, discussed that in the setting of a new diagnosis and anticipated start of chemotherapy coming days though is understandably stressful, that continuing to smoke would precipitate additional complications or concerns.  She expressed understanding, planning for further smoking cessation with starting chemo. Offered NRT on admission and 2/28 that she has respectfully declined.   - Readdress NRT as indicated.     Fluids/Electrolytes/Nutrition   - IVF prn   - PRN lyte replacement per standing protocol  - Regular diet as tolerated     Lines: PICC    PPX  VTE: Lovenox, hold for plts < 50k  GI: Famotidine 10 mg BID  Bowels: prn senna and miralax  Activity: as tolerated    Code: DNR/DNI    Medically Ready for Discharge: Anticipated in 5+ Days    Disposition: Admitted for further workup and management. Discharge pending treatment decisions and clinical course.   Follow-up: Will need to request APPT18 closer to discharge date and determine primary oncologist.     I spent 90 minutes face-to-face and/or coordinating or discussing care plan. Over 50% of our time on the unit was spent counseling the patient and coordinating care    Staffed with Dr. Hwang.    Chelsie Murphy PA-C  Hematology/Oncology  Pager #[phone]    Interval History   Overnight notes reviewed. Afebrile and HDS. No acute events overnight.     Rafiq is resting in bed this morning.  Endorses to be feeling well.  She denies any new symptoms or concerns today.  We reviewed [facility] pathology review of outside bone marrow biopsy slides consistent with an AML diagnosis.   "Additionally discussed plan to proceed with induction chemotherapy with 7+3.  Reviewed regimen including chemotherapy agents, potential side effects, adverse reactions, monitoring, expected decrease in blood counts, risk of infection, and anticipated length of admission pending clinical course.  We additionally discussed possible repeat bone marrow biopsy.  Chemotherapy handout sheets were provided to patient, she appreciates.  We discussed PICC placement prior.  Patient expressed understanding and is in agreement to plan and proceeding with chemotherapy.  All questions and concerns were addressed to patient's stated satisfaction.  She otherwise denies fever, chills, mouth sores, SOB, abdominal pain, diarrhea, constipation, nausea, vomiting, dysuria, hematuria, numbness, tingling, swelling.     Complete and Comprehensive review of systems review and negative other than noted here or in the HPI.     Physical Exam   Temp: 97.9  F (36.6  C) Temp src: Oral BP: 117/81 Pulse: 84   Resp: 16 SpO2: 97 % O2 Device: None (Room air)    Vitals:    03/03/25 0727 03/04/25 0915 03/05/25 0821   Weight: 80.2 kg (176 lb 12.8 oz) 78.7 kg (173 lb 6.4 oz) 78.8 kg (173 lb 11.2 oz)     Vital Signs with Ranges  Temp:  [97.4  F (36.3  C)-97.9  F (36.6  C)] 97.9  F (36.6  C)  Pulse:  [68-92] 84  Resp:  [16-18] 16  BP: (109-135)/(69-81) 117/81  SpO2:  [91 %-98 %] 97 %  I/O last 3 completed shifts:  In: 1720 [P.O.:1600; I.V.:20; IV Piggyback:100]  Out: 2650 [Urine:2650]      Physical Exam:    Blood pressure 117/81, pulse 84, temperature 97.9  F (36.6  C), temperature source Oral, resp. rate 16, height 1.626 m (5' 4\"), weight 78.8 kg (173 lb 11.2 oz), last menstrual period 01/01/2007, SpO2 97%, not currently breastfeeding.    Constitutional: Awake and conversational. Non-toxic appearing. No acute distress.   HEENT: Normocephalic, atraumatic. Sclerae anicteric. Moist mucus membranes without lesions, abscess, or thrush.  Respiratory: Breathing " comfortably on room air with no accessory muscle use. Speaking in full sentences, no evidence of respiratory distress. Lungs CTAB, no wheeze or rales.   Cardiovascular: Regular rate and rhythm. Soft systolic murmur consistent with known aortic stenosis.  No peripheral edema.    GI: Abdomen with normoactive bowel sounds, soft, non-distended, and non-tender throughout. No rebound or guarding.   Skin: Skin is clean, dry, intact. No jaundice or significant rashes appreciated.   Neurologic: Alert and oriented with normal speech. Memory and thought process preserved. Moves all extremities spontaneously.   Neuropsychiatric: Calm, appropriate mood and affect congruent to situation. Good judgment and insight.   Vascular access: PIV. CDI. No evidence of erythema or inflammation.       Medications   Current Facility-Administered Medications   Medication Dose Route Frequency Provider Last Rate Last Admin     Current Facility-Administered Medications   Medication Dose Route Frequency Provider Last Rate Last Admin    acyclovir (ZOVIRAX) tablet 400 mg  400 mg Oral BID Chelsie Murphy PA-C   400 mg at 03/05/25 0929    allopurinol (ZYLOPRIM) tablet 300 mg  300 mg Oral Daily Chelsie Murphy PA-C   300 mg at 03/05/25 0929    enoxaparin ANTICOAGULANT (LOVENOX) injection 40 mg  40 mg Subcutaneous Q24H Chelsie Murphy PA-C   40 mg at 03/04/25 2038    famotidine (PEPCID) tablet 10 mg  10 mg Oral BID Chelsie Murphy PA-C   10 mg at 03/05/25 0929    fluticasone-vilanterol (BREO ELLIPTA) 100-25 MCG/ACT inhaler 1 puff  1 puff Inhalation Daily Chelsie Murphy PA-C   1 puff at 03/05/25 0925    [START ON 3/11/2025] levofloxacin (LEVAQUIN) tablet 500 mg  500 mg Oral Daily Zully Garcia PA-C        levofloxacin (LEVAQUIN) tablet 750 mg  750 mg Oral Daily Zully Garcia PA-C   750 mg at 03/05/25 0926    lisinopril (ZESTRIL) tablet 10 mg  10 mg Oral Daily Jyoti Tenorio PA-C   10  mg at 03/05/25 0929    loratadine (CLARITIN) tablet 10 mg  10 mg Oral Daily Chelsie Murphy PA-C        micafungin (MYCAMINE) 50 mg in sodium chloride 0.9 % 100 mL intermittent infusion  50 mg Intravenous Q24H Chelsie Murphy PA-C 100 mL/hr at 03/04/25 2039 50 mg at 03/04/25 2039    nicotine (NICODERM CQ) 14 MG/24HR 24 hr patch 1 patch  1 patch Transdermal Daily Chelsie Murphy PA-C   1 patch at 02/28/25 1641    ofloxacin (FLOXIN) 0.3 % otic solution 5 drop  5 drop Left Ear Daily Consuelo Robles MD   5 drop at 03/05/25 0930    PARoxetine (PAXIL) tablet 20 mg  20 mg Oral QAM Chelsie Murphy PA-C   20 mg at 03/05/25 0925    sodium chloride (PF) 0.9% PF flush 3 mL  3 mL Intracatheter Q8H Chelsie Murphy PA-C   3 mL at 03/04/25 1748    Vitamin D3 (CHOLECALCIFEROL) tablet 1,000 Units  1,000 Units Oral Daily Chelsie Murphy PA-C   1,000 Units at 03/05/25 0928       Data   Results for orders placed or performed during the hospital encounter of 02/26/25 (from the past 24 hours)   Phosphorus   Result Value Ref Range    Phosphorus 3.8 2.5 - 4.5 mg/dL   Uric acid   Result Value Ref Range    Uric Acid 2.9 2.4 - 5.7 mg/dL   CBC with platelets differential    Narrative    The following orders were created for panel order CBC with platelets differential.  Procedure                               Abnormality         Status                     ---------                               -----------         ------                     CBC with platelets and d...[[id number]]  Abnormal            Final result               RBC and Platelet Morphology[[id number]]                                                 Manual Differential[[id number]]          Abnormal            Final result                 Please view results for these tests on the individual orders.   Comprehensive metabolic panel   Result Value Ref Range    Sodium 139 135 - 145 mmol/L    Potassium 4.3 3.4 - 5.3  mmol/L    Carbon Dioxide (CO2) 25 22 - 29 mmol/L    Anion Gap 13 7 - 15 mmol/L    Urea Nitrogen 14.8 6.0 - 20.0 mg/dL    Creatinine 0.75 0.51 - 0.95 mg/dL    GFR Estimate >90 >60 mL/min/1.73m2    Calcium 9.4 8.8 - 10.4 mg/dL    Chloride 101 98 - 107 mmol/L    Glucose 116 (H) 70 - 99 mg/dL    Alkaline Phosphatase 161 (H) 40 - 150 U/L    AST 32 0 - 45 U/L    ALT 21 0 - 50 U/L    Protein Total 7.3 6.4 - 8.3 g/dL    Albumin 3.9 3.5 - 5.2 g/dL    Bilirubin Total 0.4 <=1.2 mg/dL   INR   Result Value Ref Range    INR 1.10 0.85 - 1.15   Fibrinogen activity   Result Value Ref Range    Fibrinogen Activity 606 (H) 170 - 510 mg/dL   Partial thromboplastin time   Result Value Ref Range    aPTT 29 22 - 38 Seconds   Magnesium   Result Value Ref Range    Magnesium 2.5 (H) 1.7 - 2.3 mg/dL   Lactate Dehydrogenase (aka LDH)   Result Value Ref Range    Lactate Dehydrogenase 410 (H) 0 - 250 U/L   Phosphorus   Result Value Ref Range    Phosphorus 4.4 2.5 - 4.5 mg/dL   Uric acid   Result Value Ref Range    Uric Acid 3.1 2.4 - 5.7 mg/dL   CBC with platelets and differential   Result Value Ref Range    WBC Count 3.3 (L) 4.0 - 11.0 10e3/uL    RBC Count 2.24 (L) 3.80 - 5.20 10e6/uL    Hemoglobin 8.6 (L) 11.7 - 15.7 g/dL    Hematocrit 24.5 (L) 35.0 - 47.0 %     (H) 78 - 100 fL    MCH 38.4 (H) 26.5 - 33.0 pg    MCHC 35.1 31.5 - 36.5 g/dL    RDW 15.2 (H) 10.0 - 15.0 %    Platelet Count 166 150 - 450 10e3/uL   Manual Differential   Result Value Ref Range    % Neutrophils 15 %    % Lymphocytes 70 %    % Monocytes 8 %    % Eosinophils 1 %    % Basophils 1 %    % Blasts 5 %    NRBCs per 100 WBC 7 (H) <=0 %    Absolute Neutrophils 0.5 (L) 1.6 - 8.3 10e3/uL    Absolute Lymphocytes 2.3 0.8 - 5.3 10e3/uL    Absolute Monocytes 0.3 0.0 - 1.3 10e3/uL    Absolute Eosinophils 0.0 0.0 - 0.7 10e3/uL    Absolute Basophils 0.0 0.0 - 0.2 10e3/uL    Absolute Blasts 0.2 (H) <=0.0 10e3/uL    Absolute NRBCs 0.2 (H) <=0.0 10e3/uL    RBC Morphology Confirmed RBC  Indices     Platelet Assessment  Automated Count Confirmed. Platelet morphology is normal.     Automated Count Confirmed. Platelet morphology is normal.

## 2025-03-05 NOTE — PLAN OF CARE
23:00- 07:00  Goal Outcome Evaluation: no acute issues overnight      Plan of Care Reviewed With: patient    Overall Patient Progress: no change       Patient AOx4, AVSS on RA. Denies pain , nausea and SOB. LBM 3/3. Voiding spontaneously w/ good UO. PIV SL. Up independently. Was worried and tearful during the start of shift regarding the treatment plan that she will have. Clustered care. Plan to start chemo 7+3 today. Contiue w/ POC.

## 2025-03-05 NOTE — PROGRESS NOTES
Vascular Notes:     Sent a message regarding CHG bath & linen change prior to PICC placement around 1520H, apparently as per bedside nurse currently pt. Is not in the room and they will do the chg bath once she's back. Notified bedside nurse that writer is ready to place the PICC line once patient is back.

## 2025-03-06 VITALS
BODY MASS INDEX: 30.4 KG/M2 | SYSTOLIC BLOOD PRESSURE: 104 MMHG | DIASTOLIC BLOOD PRESSURE: 63 MMHG | HEART RATE: 74 BPM | HEIGHT: 64 IN | OXYGEN SATURATION: 97 % | WEIGHT: 178.1 LBS | TEMPERATURE: 98 F | RESPIRATION RATE: 16 BRPM

## 2025-03-06 LAB
ALBUMIN SERPL BCG-MCNC: 3.9 G/DL (ref 3.5–5.2)
ALP SERPL-CCNC: 160 U/L (ref 40–150)
ALT SERPL W P-5'-P-CCNC: 22 U/L (ref 0–50)
ANION GAP SERPL CALCULATED.3IONS-SCNC: 11 MMOL/L (ref 7–15)
ANION GAP SERPL CALCULATED.3IONS-SCNC: 13 MMOL/L (ref 7–15)
APTT PPP: 28 SECONDS (ref 22–38)
AST SERPL W P-5'-P-CCNC: 32 U/L (ref 0–45)
BASOPHILS # BLD MANUAL: 0 10E3/UL (ref 0–0.2)
BASOPHILS NFR BLD MANUAL: 1 %
BILIRUB SERPL-MCNC: 0.4 MG/DL
BLASTS # BLD MANUAL: 0.2 10E3/UL
BLASTS NFR BLD MANUAL: 10 %
BUN SERPL-MCNC: 21.8 MG/DL (ref 6–20)
BUN SERPL-MCNC: 22.8 MG/DL (ref 6–20)
CALCIUM SERPL-MCNC: 9.5 MG/DL (ref 8.8–10.4)
CALCIUM SERPL-MCNC: 9.7 MG/DL (ref 8.8–10.4)
CHLORIDE SERPL-SCNC: 100 MMOL/L (ref 98–107)
CHLORIDE SERPL-SCNC: 96 MMOL/L (ref 98–107)
CREAT SERPL-MCNC: 0.74 MG/DL (ref 0.51–0.95)
CREAT SERPL-MCNC: 0.8 MG/DL (ref 0.51–0.95)
EGFRCR SERPLBLD CKD-EPI 2021: 85 ML/MIN/1.73M2
EGFRCR SERPLBLD CKD-EPI 2021: >90 ML/MIN/1.73M2
EOSINOPHIL # BLD MANUAL: 0 10E3/UL (ref 0–0.7)
EOSINOPHIL NFR BLD MANUAL: 0 %
ERYTHROCYTE [DISTWIDTH] IN BLOOD BY AUTOMATED COUNT: 14.9 % (ref 10–15)
FIBRINOGEN PPP-MCNC: 516 MG/DL (ref 170–510)
GLUCOSE SERPL-MCNC: 140 MG/DL (ref 70–99)
GLUCOSE SERPL-MCNC: 187 MG/DL (ref 70–99)
HCO3 SERPL-SCNC: 25 MMOL/L (ref 22–29)
HCO3 SERPL-SCNC: 25 MMOL/L (ref 22–29)
HCT VFR BLD AUTO: 23 % (ref 35–47)
HGB BLD-MCNC: 7.8 G/DL (ref 11.7–15.7)
INR PPP: 1.05 (ref 0.85–1.15)
LDH SERPL L TO P-CCNC: 465 U/L (ref 0–250)
LYMPHOCYTES # BLD MANUAL: 1.1 10E3/UL (ref 0.8–5.3)
LYMPHOCYTES NFR BLD MANUAL: 53 %
Lab: NORMAL
MAGNESIUM SERPL-MCNC: 2.4 MG/DL (ref 1.7–2.3)
MCH RBC QN AUTO: 38.4 PG (ref 26.5–33)
MCHC RBC AUTO-ENTMCNC: 33.9 G/DL (ref 31.5–36.5)
MCV RBC AUTO: 113 FL (ref 78–100)
MONOCYTES # BLD MANUAL: 0.1 10E3/UL (ref 0–1.3)
MONOCYTES NFR BLD MANUAL: 5 %
NEUTROPHILS # BLD MANUAL: 0.7 10E3/UL (ref 1.6–8.3)
NEUTROPHILS NFR BLD MANUAL: 31 %
NRBC # BLD AUTO: 0.1 10E3/UL
NRBC BLD MANUAL-RTO: 6 %
PERFORMING LABORATORY: NORMAL
PHOSPHATE SERPL-MCNC: 4.3 MG/DL (ref 2.5–4.5)
PHOSPHATE SERPL-MCNC: 4.3 MG/DL (ref 2.5–4.5)
PLAT MORPH BLD: ABNORMAL
PLATELET # BLD AUTO: 161 10E3/UL (ref 150–450)
POTASSIUM SERPL-SCNC: 4.6 MMOL/L (ref 3.4–5.3)
POTASSIUM SERPL-SCNC: 4.8 MMOL/L (ref 3.4–5.3)
PROT SERPL-MCNC: 7.3 G/DL (ref 6.4–8.3)
RBC # BLD AUTO: 2.03 10E6/UL (ref 3.8–5.2)
RBC MORPH BLD: ABNORMAL
SODIUM SERPL-SCNC: 134 MMOL/L (ref 135–145)
SODIUM SERPL-SCNC: 136 MMOL/L (ref 135–145)
SPECIMEN STATUS: NORMAL
TEST NAME: NORMAL
URATE SERPL-MCNC: 3.2 MG/DL (ref 2.4–5.7)
URATE SERPL-MCNC: 3.3 MG/DL (ref 2.4–5.7)
WBC # BLD AUTO: 2.2 10E3/UL (ref 4–11)

## 2025-03-06 PROCEDURE — 250N000013 HC RX MED GY IP 250 OP 250 PS 637

## 2025-03-06 PROCEDURE — 258N000003 HC RX IP 258 OP 636: Performed by: INTERNAL MEDICINE

## 2025-03-06 PROCEDURE — 250N000011 HC RX IP 250 OP 636: Mod: JZ | Performed by: INTERNAL MEDICINE

## 2025-03-06 PROCEDURE — 82435 ASSAY OF BLOOD CHLORIDE: CPT

## 2025-03-06 PROCEDURE — 85041 AUTOMATED RBC COUNT: CPT

## 2025-03-06 PROCEDURE — 82310 ASSAY OF CALCIUM: CPT

## 2025-03-06 PROCEDURE — 250N000011 HC RX IP 250 OP 636

## 2025-03-06 PROCEDURE — 85007 BL SMEAR W/DIFF WBC COUNT: CPT

## 2025-03-06 PROCEDURE — 250N000013 HC RX MED GY IP 250 OP 250 PS 637: Performed by: PHYSICIAN ASSISTANT

## 2025-03-06 PROCEDURE — 250N000012 HC RX MED GY IP 250 OP 636 PS 637: Performed by: INTERNAL MEDICINE

## 2025-03-06 PROCEDURE — 999N000202 HC STATISTICAL VASC ACCESS NURSE TIME, 1-15 MINUTES

## 2025-03-06 PROCEDURE — 83615 LACTATE (LD) (LDH) ENZYME: CPT

## 2025-03-06 PROCEDURE — 85384 FIBRINOGEN ACTIVITY: CPT

## 2025-03-06 PROCEDURE — 85610 PROTHROMBIN TIME: CPT

## 2025-03-06 PROCEDURE — 999N000007 HC SITE CHECK

## 2025-03-06 PROCEDURE — 84550 ASSAY OF BLOOD/URIC ACID: CPT

## 2025-03-06 PROCEDURE — 85730 THROMBOPLASTIN TIME PARTIAL: CPT

## 2025-03-06 PROCEDURE — 99233 SBSQ HOSP IP/OBS HIGH 50: CPT | Mod: FS

## 2025-03-06 PROCEDURE — 120N000002 HC R&B MED SURG/OB UMMC

## 2025-03-06 PROCEDURE — 84100 ASSAY OF PHOSPHORUS: CPT

## 2025-03-06 PROCEDURE — 83735 ASSAY OF MAGNESIUM: CPT

## 2025-03-06 PROCEDURE — 258N000003 HC RX IP 258 OP 636

## 2025-03-06 RX ADMIN — FLUTICASONE FUROATE AND VILANTEROL TRIFENATATE 1 PUFF: 100; 25 POWDER RESPIRATORY (INHALATION) at 09:02

## 2025-03-06 RX ADMIN — DAUNORUBICIN HYDROCHLORIDE 113 MG: 5 INJECTION, SOLUTION INTRAVENOUS at 21:24

## 2025-03-06 RX ADMIN — ENOXAPARIN SODIUM 40 MG: 40 INJECTION SUBCUTANEOUS at 19:19

## 2025-03-06 RX ADMIN — ACETAMINOPHEN 650 MG: 325 TABLET, FILM COATED ORAL at 11:16

## 2025-03-06 RX ADMIN — HEPARIN, PORCINE (PF) 10 UNIT/ML INTRAVENOUS SYRINGE 5 ML: at 00:39

## 2025-03-06 RX ADMIN — MICAFUNGIN SODIUM 50 MG: 50 INJECTION, POWDER, LYOPHILIZED, FOR SOLUTION INTRAVENOUS at 19:18

## 2025-03-06 RX ADMIN — FAMOTIDINE 10 MG: 10 TABLET ORAL at 09:05

## 2025-03-06 RX ADMIN — HEPARIN, PORCINE (PF) 10 UNIT/ML INTRAVENOUS SYRINGE 5 ML: at 21:58

## 2025-03-06 RX ADMIN — LISINOPRIL 10 MG: 10 TABLET ORAL at 09:05

## 2025-03-06 RX ADMIN — DEXAMETHASONE 12 MG: 4 TABLET ORAL at 19:16

## 2025-03-06 RX ADMIN — PAROXETINE HYDROCHLORIDE 20 MG: 20 TABLET, FILM COATED ORAL at 09:05

## 2025-03-06 RX ADMIN — LEVOFLOXACIN 750 MG: 750 TABLET, FILM COATED ORAL at 09:05

## 2025-03-06 RX ADMIN — LORATADINE 10 MG: 10 TABLET ORAL at 09:05

## 2025-03-06 RX ADMIN — OFLOXACIN 5 DROP: 3 SOLUTION AURICULAR (OTIC) at 09:07

## 2025-03-06 RX ADMIN — ALLOPURINOL 300 MG: 300 TABLET ORAL at 09:05

## 2025-03-06 RX ADMIN — ONDANSETRON HYDROCHLORIDE 8 MG: 8 TABLET, FILM COATED ORAL at 19:18

## 2025-03-06 RX ADMIN — NICOTINE 1 PATCH: 14 PATCH, EXTENDED RELEASE TRANSDERMAL at 15:30

## 2025-03-06 RX ADMIN — ACYCLOVIR 400 MG: 400 TABLET ORAL at 09:05

## 2025-03-06 RX ADMIN — FAMOTIDINE 10 MG: 10 TABLET ORAL at 19:18

## 2025-03-06 RX ADMIN — HEPARIN, PORCINE (PF) 10 UNIT/ML INTRAVENOUS SYRINGE 5 ML: at 18:05

## 2025-03-06 RX ADMIN — ACETAMINOPHEN 650 MG: 325 TABLET, FILM COATED ORAL at 19:16

## 2025-03-06 RX ADMIN — Medication 1000 UNITS: at 09:05

## 2025-03-06 RX ADMIN — ONDANSETRON HYDROCHLORIDE 8 MG: 8 TABLET, FILM COATED ORAL at 09:04

## 2025-03-06 RX ADMIN — ACYCLOVIR 400 MG: 400 TABLET ORAL at 19:18

## 2025-03-06 RX ADMIN — HEPARIN, PORCINE (PF) 10 UNIT/ML INTRAVENOUS SYRINGE 5 ML: at 05:05

## 2025-03-06 ASSESSMENT — ACTIVITIES OF DAILY LIVING (ADL)
ADLS_ACUITY_SCORE: 35
DEPENDENT_IADLS:: INDEPENDENT
ADLS_ACUITY_SCORE: 35

## 2025-03-06 NOTE — PROCEDURES
[facility]    Double Lumen PICC Placement    Date/Time: 3/5/2025 8:11 PM    Performed by: Teresa Chavez RN  Authorized by: Chelsie Murphy PA-C  Indications: chemotherapy.      UNIVERSAL PROTOCOL   Site Marked: Yes  Prior Images Obtained and Reviewed:  Yes  Required items: Required blood products, implants, devices and special equipment available    Patient identity confirmed:  Verbally with patient, arm band, provided demographic data and hospital-assigned identification number  Patient was reevaluated immediately before administering moderate or deep sedation or anesthesia  Confirmation Checklist:  Patient's identity using two indicators, relevant allergies, procedure was appropriate and matched the consent or emergent situation and correct equipment/implants were available  Time out: Immediately prior to the procedure a time out was called    Universal Protocol: the [facility] Universal Protocol was followed    Preparation: Patient was prepped and draped in usual sterile fashion       ANESTHESIA    Anesthesia:  See MAR for details  Local Anesthetic:  Lidocaine 1% without epinephrine  Anesthetic Total (mL):  2      SEDATION    Patient Sedated: No        Preparation: skin prepped with ChloraPrep  Skin prep agent: skin prep agent completely dried prior to procedure  Sterile barriers: maximum sterile barriers were used: cap, mask, sterile gown, sterile gloves, and large sterile sheet  Hand hygiene: hand hygiene performed prior to central venous catheter insertion  Type of line used: PICC  Catheter type: double lumen  Lumen type: non-valved and power PICC  Lumen Identification: Purple and Red  Catheter size: 5 Fr  Brand: Bard  Lot number: [id number]  Placement method: venipuncture, MST, ultrasound and tip navigation system  Number of attempts: 1  Difficulty threading catheter: no  Successful placement: yes  Orientation: right  Catheter to Vein (%):  "24  Location: brachial vein (medial) (0.71 CM)  Tip Location: SVC  Arm circumference: adults 10 cm  Extremity circumference: 31  Visible catheter length: 3  Total catheter length: 42  Dressing and securement: alcohol impregnated caps, chlorhexidine disc applied, gloves changed prior to final dressing, site cleansed, sterile dressing applied, tissue adhesive, subcutaneous anchor securement system and transparent dressing  Post procedure assessment: blood return through all ports, free fluid flow and placement verified by 3CG technology  PROCEDURE Describe Procedure: No immediate concerns post procedure, denies pain or discomfort.   PICC is verified & ready to use.    This patient's catheter is secured with SecurAcath instead of a traditional suture or adhesive based securement. This is a subcutaneous anchor securement system (aka VIELKA or SecurAcath) that holds the catheter just below the skin with nitinol feet and a friction fit  around the catheter.  It can stay with the catheter until the catheter is removed.    Do not open, change or remove the SecurAcath.  SecurAcath may be disinfected during a dressing change, but do not open or remove it.  SecurAcath acts like a hinge - lift, clean 360 degrees around, let dry and apply new dressing.  SecurAcath is compatible with all dressings and antiseptic agents.  Ensure the wings of the catheter and SecurAcath are completely under the boarder of the dressing.  SecurAcath is MRI safe to 3T, see IFU for details.  A Statlock is not necessary when SecurAcath is present.   Patients can go to MRI with SecurAcath device in place  When the catheter is indicated for removal, enter \"Nursing to Consult for Vascular Access Care\" order in [facility], watch the removal video on [employer], or call for training if you have not removed before. 24 hour SecurAcath Clinical Education and Support  [phone]   Disposal: sharps and needle count correct at the end of procedure, needles and " guidewire disposed in sharps container  Patient Tolerance:  Patient tolerated the procedure well with no immediate complications

## 2025-03-06 NOTE — PROGRESS NOTES
CLINICAL NUTRITION SERVICES - ASSESSMENT NOTE     Nutrition Prescription    RECOMMENDATIONS FOR MDs/PROVIDERS TO ORDER:  None at this time     Malnutrition Status:    Patient does not meet two of the established criteria necessary for diagnosing malnutrition    Registered Dietitian Interventions:  None at this time     Future/Additional Recommendations:  Monitor nutrition-related findings and follow pt per protocol     REASON FOR ASSESSMENT  Alejandra Villegas is a/an 59 year old female assessed by the dietitian for LOS    Patient admitted for AML workup     SUBJECTIVE INFORMATION  Assessed patient in room.    MEDICAL HISTORY  PMH of COPD, migraines, rheumatoid arthritis, and anxiety, new  AML     NUTRITION HISTORY  Good appetite and intake PTA and since admission, OSH snacks + RS orders   Food allergies: None noted     CURRENT NUTRITION ORDERS  Diet:  Regular; ONS PRN   Intake/Tolerance: %, frequently 0% at breakfast (typically doesn't order)   RS order review shows 7-day avg of 1730 kcal/day and 73 grams protein/day, 5-day avg of 1759 kcal/day and 71 grams protein/day as ordered.       MEDICATIONS  Nutrition-relevant medications: Reviewed    LABS  Nutrition-relevant labs: Reviewed  Electrolytes  Potassium (mmol/L)   Date Value   03/06/2025 4.6   03/05/2025 4.3   03/05/2025 4.3   05/28/2022 3.7   03/30/2022 4.6   03/24/2020 3.6   12/03/2018 3.7   05/11/2018 3.7     Phosphorus (mg/dL)   Date Value   03/06/2025 4.3   03/05/2025 4.3   03/05/2025 4.4   03/04/2025 3.8   03/04/2025 4.6 (H)    Blood Glucose  Glucose (mg/dL)   Date Value   03/06/2025 187 (H)   03/05/2025 123 (H)   03/05/2025 116 (H)   03/04/2025 104 (H)   03/03/2025 115 (H)   05/28/2022 115 (H)   03/30/2022 123 (H)   03/24/2020 104   12/03/2018 96   05/11/2018 92   09/17/2017 101   08/16/2017 105     GLUCOSE BY METER POCT (mg/dL)   Date Value   02/13/2025 98   12/19/2024 101 (H)   08/10/2023 106 (H)   05/05/2022 109 (H)     Hemoglobin A1C (%)   Date  "Value   12/09/2024 6.0 (H)   08/04/2023 5.7   03/30/2022 6.0   03/24/2020 5.8   12/03/2018 5.8   05/11/2018 5.8    Inflammatory Markers  CRP Inflammation (mg/L)   Date Value   02/06/2025 6.20 (H)     WBC (10e9/L)   Date Value   03/24/2020 8.2   12/03/2018 8.1     WBC Count (10e3/uL)   Date Value   03/06/2025 2.2 (L)   03/05/2025 3.3 (L)   03/04/2025 3.8 (L)     Albumin (g/dL)   Date Value   03/06/2025 3.9   03/05/2025 3.9   03/04/2025 3.8   03/30/2022 4.3   09/17/2017 3.8   05/01/2017 4.1   04/04/2016 4.1      Magnesium (mg/dL)   Date Value   03/06/2025 2.4 (H)   03/05/2025 2.5 (H)   03/04/2025 2.5 (H)     Sodium (mmol/L)   Date Value   03/06/2025 136   03/05/2025 134 (L)   03/05/2025 139   03/24/2020 138   12/03/2018 135   05/11/2018 137    Renal  Urea Nitrogen (mg/dL)   Date Value   03/06/2025 21.8 (H)   03/05/2025 17.6   03/05/2025 14.8   05/28/2022 9   03/30/2022 24   03/24/2020 17   12/03/2018 8   05/11/2018 12     Creatinine (mg/dL)   Date Value   03/06/2025 0.74   03/05/2025 0.86   03/05/2025 0.75   03/24/2020 0.88   12/03/2018 0.78   05/11/2018 0.72     Additional  Triglycerides (mg/dL)   Date Value   01/14/2025 130   12/09/2024 156 (H)   03/30/2022 481 (H)   03/24/2020 149   12/03/2018 178 (H)   05/01/2017 163 (H)     Ketones Urine (mg/dL)   Date Value   02/26/2025 Negative   05/11/2018 Negative     Platelet Count   Date Value   03/06/2025 161 10e3/uL   03/24/2020 333 10e9/L     aPTT (Seconds)   Date Value   03/06/2025 28        ANTHROPOMETRICS  Height: 162.6 cm (5' 4\")  Most Recent Weight: 78.8 kg (173 lb 11.2 oz)    IBW: 54.5 kg   146%IBW   Body mass index is 29.82 kg/m . BMI Category: Overweight BMI 25-29.9    Weight History:   No significant weight loss noted.  Wt Readings from Last 15 Encounters:   03/05/25 78.8 kg (173 lb 11.2 oz)   02/13/25 79.4 kg (175 lb)   02/10/25 79.7 kg (175 lb 9.6 oz)   02/06/25 77.7 kg (171 lb 6.4 oz)   01/14/25 83.9 kg (185 lb)   12/19/24 78.5 kg (173 lb)   12/09/24 81.2 kg " (179 lb)   11/22/24 83.7 kg (184 lb 9.6 oz)   09/27/24 83.2 kg (183 lb 6.4 oz)   09/23/24 82.6 kg (182 lb 1.6 oz)   09/20/24 81.9 kg (180 lb 8 oz)   01/23/24 80.2 kg (176 lb 11.2 oz)   10/09/23 80.2 kg (176 lb 14.4 oz)   08/10/23 80.7 kg (178 lb)   08/04/23 81.1 kg (178 lb 12.8 oz)       ASSESSED NUTRITION NEEDS  Dosing Weight: 61 kg (Adjusted BW) based on lowest doc wt this adm (78.8 kg on 3/5) and IBW of 54.5 kg  Estimated Energy Needs: 1525 - 1830 kcals/day (25 - 30 kcals/kg)  Justification: Maintenance  Estimated Protein Needs: 73 -  92 grams protein/day (1.2 - 1.5 grams of pro/kg)  Justification: Increased needs  Estimated Fluid Needs: 1525 - 1830 mL/day (1 mL/kcal)   Justification: Maintenance    SYSTEM FINDINGS    GI symptoms: 0-4/day unmeasured stool occurrences over the past week (last BM 3/5) and 0-1 emesis/day over the past week per I/O (x1 3/3 and x1 3/4)   Renal status: GFR >90  Respiratory status: Room air   Skin: No pressure injuries documented at this time     PHYSICAL FINDINGS  See malnutrition section below.  No abnormal nutrition-related physical findings observed.     MALNUTRITION  % Intake: Decreased intake does not meet criteria  % Weight Loss: None noted  Subcutaneous Fat Loss: None observed  Muscle Loss: None observed  Fluid Accumulation/Edema: None noted  Malnutrition Diagnosis: Patient does not meet two of the established criteria necessary for diagnosing malnutrition  Malnutrition Present on Admission: No    NUTRITION DIAGNOSIS  Predicted inadequate nutrient intake  related to anticipated prolonged hospitalization as evidenced by hypermetabolic disease state, expected LOS of >20 days, good appetite/intakes currently        INTERVENTIONS   None at this time     Goals  Patient to consume % of nutritionally adequate meal trays TID, or the equivalent with supplements/snacks.  Weight maintenance >80 kg    Monitoring/Evaluation  Progress toward goals will be monitored and evaluated per  policy.    Maryann Martel, MPH, RDN, LD  6A + 5A  ([facility] Hem[facility]) RD or Radha [6A or 5A Clinical Dietitian]   Weekend/Holiday: Voc[facility]eli - Weekend Clinical Dietitian

## 2025-03-06 NOTE — PLAN OF CARE
Problem: Adult Inpatient Plan of Care  Goal: Plan of Care Review  Description: The Plan of Care Review/Shift note should be completed every shift.  The Outcome Evaluation is a brief statement about your assessment that the patient is improving, declining, or no change.  This information will be displayed automatically on your shift  note.  Flowsheets (Taken 3/6/2025 1442)  Outcome Evaluation: CMA completed at bedside. Pt independent and works full time at baseline. Anticiapte 4-6 week stay for chemo, labs and transfusions at discharge. CM will sign off for now.  Plan of Care Reviewed With: patient  Goal: Readiness for Transition of Care  Recent Flowsheet Documentation  Taken 3/6/2025 1400 by Ashley Arroyo MSW  Transportation Anticipated: (TBD) other (see comments)  Concerns to be Addressed: discharge planning  Intervention: Mutually Develop Transition Plan  Recent Flowsheet Documentation  Taken 3/6/2025 1400 by Ashley Arroyo MSW  Readmission Within the Last 30 Days: no previous admission in last 30 days  Transportation Anticipated: (TBD) other (see comments)  Concerns to be Addressed: discharge planning  Patient/Family Anticipated Services at Transition: none  Patient/Family Anticipates Transition to: home with family  Equipment Currently Used at Home: none

## 2025-03-06 NOTE — PLAN OF CARE
"Goal Outcome Evaluation:      Plan of Care Reviewed With: patient    Overall Patient Progress: no changeOverall Patient Progress: no change    Outcome Evaluation: Cycle 1 Day 1 7+3 for newly diagnosed AML    Patient feeling well this morning; no complaints of pain or nausea. Per patient, she is normally a \"grazer\" instead of eating 3 square meals. RN notified Dietician about patient's eating Patient ambulating halls frequently & is aware that she cannot leave the 5th floor. Patient politely declined the Nicotine Patch this morning stating that she had no cravings to smoke at that time. RN told patient she can ask for the Nicotine patch at any time.   "

## 2025-03-06 NOTE — PROGRESS NOTES
Nursing Focus: Chemotherapy  D: Positive blood return via PICC . Insertion site is clean/dry/intact, dressing intact with no complaints of pain.  Pre infusion assessment documented in Flowsheet (if applicable).    I: Premedications given per order (see electronic medical administration record). Dose #1 of Daunorubicin started to infuse over 15 minutes. Reviewed pt teaching on chemotherapy side effects.  Pt denies need for further teaching. Chemotherapy double checked per protocol by two chemotherapy competent RN's.   A: Tolerating infusion well. Denies nausea and or pain.   P: Continue to monitor urine output and symptoms of nausea. Screen for symptoms of toxicity.

## 2025-03-06 NOTE — CONSULTS
Care Management Initial Consult    General Information  Assessment completed with: Patient,    Type of CM/SW Visit: Initial Assessment    Primary Care Provider verified and updated as needed: Yes (No PCP, would like one)   Readmission within the last 30 days: no previous admission in last 30 days      Reason for Consult: other (see comments) (elevated readmission risk score)  Advance Care Planning: Advance Care Planning Reviewed: no concerns identified          Communication Assessment  Patient's communication style: spoken language (English or Bilingual)    Hearing Difficulty or Deaf: no   Wear Glasses or Blind: yes    Cognitive  Cognitive/Neuro/Behavioral: WDL  Level of Consciousness: alert  Arousal Level: opens eyes spontaneously  Orientation: oriented x 4  Mood/Behavior: calm, cooperative  Best Language: 0 - No aphasia  Speech: clear, spontaneous, logical    Living Environment:   People in home: other (see comments) (family, unspecified)     Current living Arrangements: house      Able to return to prior arrangements: yes       Family/Social Support:  Care provided by: self  Provides care for: no one  Marital Status: Single  Support system: Other (specify) (family)          Description of Support System: Involved, Supportive    Support Assessment: Adequate family and caregiver support, Adequate social supports    Current Resources:   Patient receiving home care services: No        Community Resources: None  Equipment currently used at home: none  Supplies currently used at home: None    Employment/Financial:  Employment Status: disabled        Financial Concerns: none           Does the patient's insurance plan have a 3 day qualifying hospital stay waiver?  No    Lifestyle & Psychosocial Needs:  Social Drivers of Health     Food Insecurity: Low Risk  (2/26/2025)    Food Insecurity     Within the past 12 months, did you worry that your food would run out before you got money to buy more?: No     Within the past  12 months, did the food you bought just not last and you didn t have money to get more?: No   Depression: Not at risk (2/10/2025)    PHQ-2     PHQ-2 Score: 0   Housing Stability: Low Risk  (2/26/2025)    Housing Stability     Do you have housing? : Yes     Are you worried about losing your housing?: No   Tobacco Use: High Risk (2/13/2025)    Patient History     Smoking Tobacco Use: Every Day     Smokeless Tobacco Use: Never     Passive Exposure: Past   Financial Resource Strain: Low Risk  (2/26/2025)    Financial Resource Strain     Within the past 12 months, have you or your family members you live with been unable to get utilities (heat, electricity) when it was really needed?: No   Alcohol Use: Not on file   Transportation Needs: Low Risk  (2/26/2025)    Transportation Needs     Within the past 12 months, has lack of transportation kept you from medical appointments, getting your medicines, non-medical meetings or appointments, work, or from getting things that you need?: No   Physical Activity: Not on file   Interpersonal Safety: Low Risk  (2/26/2025)    Interpersonal Safety     Do you feel physically and emotionally safe where you currently live?: Yes     Within the past 12 months, have you been hit, slapped, kicked or otherwise physically hurt by someone?: No     Within the past 12 months, have you been humiliated or emotionally abused in other ways by your partner or ex-partner?: No   Recent Concern: Interpersonal Safety - High Risk (2/13/2025)    Interpersonal Safety     Do you feel physically and emotionally safe where you currently live?: No     Within the past 12 months, have you been hit, slapped, kicked or otherwise physically hurt by someone?: No     Within the past 12 months, have you been humiliated or emotionally abused in other ways by your partner or ex-partner?: No   Stress: Not on file   Social Connections: Not on file   Health Literacy: Not on file       Functional Status:  Prior to admission  "patient needed assistance:   Dependent ADLs:: Independent  Dependent IADLs:: Independent       Mental Health Status:  Mental Health Status: Current Concern (anxious about being hospitlized, wants to go outside)       Chemical Dependency Status:  Chemical Dependency Status: No Current Concerns             Values/Beliefs:  Spiritual, Cultural Beliefs, Mandaen Practices, Values that affect care: no               Discussed  Partnership in Safe Discharge Planning  document with patient/family: Yes:     Additional Information:  SW consulted for high readmission risk score. KATH met with pt at bedside to complete CMA. KATH introduced self, role, and purpose of meeting. SW verified pts address, contacts, and insurance.      Pt lives at home with family. Pt was not receiving in home or community services PTA. Pt does not have a PCP but would like one set up. Pt does not have DME at home.     Pt reports being independent with ADL's and iADL's at baseline. Pt was working full time at [employer]. Pt has three dogs she takes care of.     Pt expressed anxiety and wanted to go outside for fresh air. Pt reports she was outside often PTA and feels anxious being stuck in a hallway. KATH encouraged pt to talk to her RN, pt reported she has but will bring it up again. Pt reported she is \"almost to the point of just walking outside\". Pt is aware this would need to be approved by nurse manager. KATH messaged pts RN letting her know about SW and pts conversation. RN indicated this conversation had already been had multiple times.     Per charge RN in rounds, pt will be hospitalized for 4-6 weeks for chemo. Pt will most likely need labs and transfusions at discharge.     Next Steps:   -KATH/POLO will sign off for now, please re consult near discharge to follow for OP labs and transfusion needs.     KARMEN Montano   [facility]  Available via [employer]  Covering 5A/C KATH  Ph: [phone]      "

## 2025-03-06 NOTE — PROGRESS NOTES
Nursing Focus: Chemotherapy  D: Positive blood return via PICC . Insertion site is clean/dry/intact, dressing intact with no complaints of pain.  Pre infusion assessment documented in Flowsheet (if applicable).    I: Premedications given per order (see electronic medical administration record). Dose #1 of cytarabine started to infuse over 24 hours. Reviewed pt teaching on chemotherapy side effects.  Pt denies need for further teaching. Chemotherapy double checked per protocol by two chemotherapy competent RN's.   A: Tolerating infusion well. Denies nausea and or pain.   P: Continue to monitor urine output and symptoms of nausea. Screen for symptoms of toxicity.

## 2025-03-06 NOTE — PLAN OF CARE
23:00- 07:00    Goal Outcome Evaluation: C1D2 7+3 chemo      Plan of Care Reviewed With: patient  Overall Patient Progress: no change     Patient AOx4, AVSS on RA. Denies pain, nausea and SOB. CIVI Cytarabine running at 43.8ml/hr via purple lumen PICC, red lumen heplocked. Patient expressed wanting to have nicotine patch in AM. TLS labs BID. Voiding spontaneously w/ good UO. LBM 3/5. Continue w/ POC.

## 2025-03-06 NOTE — PROGRESS NOTES
"  Patient has been educated on potential risks of choosing to leave the unit and that the responsibility for patient well-being will belong to the patient. Pt has been informed that admission to hospital is due to need for medical treatment. Education given to the patient on some of the potential risks included but are not limited to:      - lack of access to nursing intervention      - possible missed appointments with MD, therapies, tests      - possible missed medications, antibiotics, management of IV's    Patient Response:\"I need to go outside to get fresh air, I'm not going to smoke. I'm feeling claustrophobic in this room even though I've been walking the halls all morning.\"    Patient notified staff prior to leaving unit: Yes  Coban wrap placed over IV prior to pt leaving unit No     Patient has CIVI Day1 Cytarabine infusing via PICC. Patient is a known smoker who had been going outside several times/day since admission 02/26/25 prior to chemo to both smoke & get fresh air. Patient has declined the Nicotine Patch & other forms of smoking cessation. Patient called RN to state that she needs to get some fresh air & doesn't feel the need to smoke. RN again offered Nicotine Patch & patient declined. RN spoke with Charge RN & Angella Pastrana, MARY; both agree that patient should not go outside. RN again told patient not to go outside while chemo infusing. Attempted call to Wendy Murphy PA-C, but was unable to take the call so a Voicera text was sent. Of note, patient is on a list to get into a bigger room since she will be here for 4-6 weeks for treatment of newly diagnosed AML. Patient stated she did NOT leave the floor; just sat in the elevator bank for a while.      "

## 2025-03-06 NOTE — PROGRESS NOTES
[facility]    Hematology / Oncology Progress Note  Hospital Day # 8  Date of Service (when I saw the patient): 03/06/2025     Assessment & Plan   Alejandra Villegas is a 59 year old female who presents with PMHx of COPD, migraines, rheumatoid arthritis, and anxiety. She was being followed at outside hematology/oncology clinic for cytopenias. Completed Bmbx at OSH 2/13/24 showing hypercellular marrow w/ 4% blasts and NPM1, IDH2, and NRAS mutations on outside pathology review. She was admitted to [facility] 2/26/25 for further workup and management. [facility] pathology review w/ 8% blasts and NPM1, IDH2, and NRAS mutations on NGS consistent with AML diagnosis. Started induction chemotherapy with 7 + 3 C1D1 =3/5/2025. Course has been complicated by neutropenic fevers, influenza A infection, and AOM.     Today:  - D2 7+3.   - C/w levofloxacin 750 mg x7 days and 5-day course of floxin drops for R AOM and L TM perforation, appreciate ENT recommendations.  - C/w TLS labs BID.   - Continue with best supportive cares.    HEME  # AML, NPM1, IDH2, NRAS  Had been seen by PCP Dec 2024 w/ progressive fatigue and nausea where she was found leukopenic WBC  2.4 and neutropenic w/ ANC 0.3. Hgb 12. Was referred to local hematology/oncology clinic for further evaluation and seen by Dr. Harris. Bmbx 2/10/25 done at OSH showed hypercellular marrow w/ trilineage hematopoiesis w/ dyspoiesis with mildly elevated blasts of 4% on morphology. NGS w/ NPM1, IDH2, and NRAS mutations. She was admitted to [facility] 2/26/25 for further workup and management.   - Baseline echo w/ EF 60-65%, mild known aortic stenosis. EKG NSR.   - CXR (2/26) with mildly increased streaky bibasilar opacities, atelectasis or edema. No consolidation.   - Baseline viral serologies: HSV 1/2, CMV, EBV, IgG positive. Otherwise HepB/HepC, HIV negative.   - HLA Typing sent on admission. Message sent to notify BMT team x2/27 for IP consult.   -  Requested and confirmed request of OS BMBx slides prior to admission (2/26) and notified [facility] special heme lab to expedite review as new diagnosis.  D/w outside lab facility again x2/27 and 2/28 regarding shipment slides and treatment decisions pending [facility] review.   - [facility] review showing hypercellular marrow with 8% blasts on morphology. NPM1, NRAS, and IDH2 mutations on NGS consistent with AML diagnosis. Per d/w on 2/4/2025 tumor board, will plan to proceed with inductio chemotherapy with 7 +3 .   - PICC placed 3/5/2025.        Treatment plan: 7 + 3 (C1D1 = 3/5/2025)     - Daunorubicin 60 mg/m  IV D1-3     - Cytarabine 100 mg/m  IV D1-7       - Pre-medications: zofran, dexamethasone 12 mg D1-3     # Panycytopenia  Secondary to underlying hematologic malignancy.  - Follow daily CBC with differential  - Transfuse to keep Hgb >7, plt >10K  - Blood consent obtained 2/26/25 on admission and placed in patients paper chart.      # Risk for TLS  # Hyperphosphatemia  Secondary to hematologic malignancy, no evidence of TLS on admission.   - Allopurinol 300 mg daily   - Phosphorus mildly elevated to 4.6 (3/4). No other e/o TLS with stable Cr, Ca2++, K+, and uric acid. Possibly in setting of early TLS or dietary related.   - TLS labs to BID today and monitor.  - If e/o SABRA, IVF bolus followed by gentle mIVF  - If phos > 5 start phoslo TID  - If uric acid > 8 give rasburicase 6 mg x1 dose  - Additional correction of electrolytes (K+) per standard protocols    # Risk for DIC  Secondary to underlying hematologic malignancy.  - Follow daily coags  - Transfuse cryo to keep fibrinogen >100, FFP to keep INR <1.8     ID  # ID prophylaxis  - Acyclovir 400 mg BID  - Levaquin 500 mg daily, increased to 750 mg while covering AOM as below  - Micafungin 50 mg daily  - PLC placed on admission for posa/vori coverage; vori w/ $1.60 copay, posa requiring PA.      # Neutropenic fever, fever resolved  # (+) Influenza A  # Rhinitis  On  admission, patient noted subjective fever with chills and rhinitis 2/25 PM prior to admission. She denies other infectious symptoms such as headaches, new/worsening shortness of breath or cough beyond baseline w/ COPD hx, diarrhea, abdominal pain/cramping. Afebrile and otherwise HDS on admission; basic ID workup completed w/ RVP/COVID/Influenza ordered.  Febrile to 100.7 PM 2/26. (+) Influenza A. Started cefepime and Tamiflu 75 mg BID x 5 days. COVID/RVP otherwise negative. CXR w/ streaky opacities, no consolidations. UA negative. BCx2 negative. Completed course of Tamiflu 75 mg BID x5 days (2/26-3/3). Also received course of IV Cefepime (2/26-3/1).     # R AOM with purulent effusion  # Small L TM perforation  Patient noted B/L ear pain, L>R s/p drainage from R side on 3/3. Noted h/o recurrent AOMs. On exam, TMs appear full with erythema to outer edges, patient noted pain w/ exam though able to tolerate. Small TM perforation noted to L side. Query if perforation from recurrent AOM or 2/2 congestion and fluid from recent influenza.  - ENT consulted, appreciate.   - Recommended 7-day course Augmentin (per cross-cover discussion with pharmacy, given h/o allergic reaction to Augmentin, increased dose of levofloxacin from 500 mg ? 750 mg daily x7-days.   - Recommended 5 day course of floxin drops to left ear for perforation - 5 drops daily   - Follow up w/ PCP for left TM perforation - if persistent in 6-8 weeks recommend outpt ENT referral.     GI/  # Nausea/Vomiting, recurrent/intermittent/resolved  Patient noted ongoing nausea w/ occasional vomiting starting Dec 2024. Has been managed with prn zofran.  Nausea on admission, now resolved.   - PRN zofran and compazine     # Urinary frequency, resolved  # Dysuria, resolved  On admission, patient noted longstanding history of urinary frequency though new mild dysuria. Denies hematuria, bladder tenderness of CVA tenderness. UA (2/26) negative, UC with no growth. Symptoms  "have since resolved.     # GERD  Noted h/o on admission, noted symptoms increasing leading up to admission  - Famotidine 10 mg BID     PULM  #COPD  Longstanding history of COPD. On admission patient reports symptoms are manageable and no recent exacerbations.   - Continue PTA Breo Ellipta inhaler and prn DuoNebs      CV  # Essential hypertension  Continue PTA lisinopril     # Mixed hyperlipidemia  Lipid panel have remained at goal, 1/14/25 panel w/ cholesterol 163, , LDL 92, HDL 45. - Held PTA atorvastatin on admission.     # H/o aortic stenosis  Patient noted on admission longstanding history of aortic stenosis. Prechemo echo w/ EF 60-65% and mild aortic stenosis and insufficiency. No previous echo to compare.      RENAL  # H/o CKD, stage 2  Baseline Cr ~ 0.7. On admission Cr 0.77.  - Continue to trend on daily labs and encourage PO hydration     NEURO  # Degenerative disc disease, L5-S1  Continue PTA gabapentin     # Chronic migraine w/o aura  Patient noted has been chronic since she was a child. Triggered by \"cracking her neck the wrong way\" and managed with prn regimen below along with resting in a dimly lit room.   - Continue PTA prn sumatriptan and prn cyclobenzaprine     ENDO  #Prediabetes  Last A1c 6.0 x12/9/24. Has been managing with lifestyle modifications.      MISC  # Rheumatoid arthritis   Patient reported trying treatment though was unable to tolerate well. Though notes manages w/ tylenol prn. RF completed at OSH 2/10 > 650. JI negative.       # BROOKS  # MDD  # Coping with new diagnosis  Patient reports good control of anxiety/depressive symptoms prior to admission. Did note feeling overwhelmed by new diagnosis. On admission discussed inpatient services such as health psychology or cordelia, she respectfully declined though will consider.   - Readdress health psych consult as indicated  - Continue PTA paroxetine      # Vit D Deficiency  Continue PTA vit D supplement    # Seasonal allergies - " claritin 10 mg daily     # Tobacco use - Patient reported starting at age 16 and has averaged 1.5 ppd up until about Jan 2025 where she has decreased use to about 1/2 ppd. She has continued to go outside to smoke on admission, discussed that in the setting of a new diagnosis and anticipated start of chemotherapy coming days though is understandably stressful, that continuing to smoke would precipitate additional complications or concerns.  She expressed understanding, planning for further smoking cessation with starting chemo. Offered NRT on admission and 2/28 that she has respectfully declined.   - Readdress NRT as indicated.     Fluids/Electrolytes/Nutrition   - IVF prn   - PRN lyte replacement per standing protocol  - Regular diet as tolerated     Lines: PICC    PPX  VTE: Lovenox, hold for plts < 50k  GI: Famotidine 10 mg BID  Bowels: prn senna and miralax  Activity: as tolerated    Code: DNR/DNI    Medically Ready for Discharge: Anticipated in 5+ Days    Disposition: Admitted for further workup and management. Discharge pending treatment decisions and clinical course.   Follow-up: Will need to request APPT18 closer to discharge date and determine primary oncologist.     I spent 45 minutes face-to-face and/or coordinating or discussing care plan. Over 50% of our time on the unit was spent counseling the patient and coordinating care    Staffed with Dr. Hwang.    Chelsie Murphy PA-C  Hematology/Oncology  Pager #[phone]    Interval History   Overnight notes reviewed. Afebrile and HDS. No acute events overnight.     Alejandra is sitting up in bed this morning, reports feeling well after starting chemo last night.  Is enjoying breakfast and coloring.  She denies new symptoms or concerns.  We reviewed chemotherapy plan, anticipated length of admission, and possible side effects.  She asks good questions which were answered to her stated satisfaction.  She otherwise denies fever, chills, mouth sores, SOB,  "cough, abdominal pain, diarrhea, constipation, nausea, vomiting, dysuria, hematuria, numbness, tingling, swelling. Denies further questions or concerns at this time.     Complete and Comprehensive review of systems review and negative other than noted here or in the HPI.     Physical Exam   Temp: 97.9  F (36.6  C) Temp src: Oral BP: 123/73 Pulse: 85   Resp: 16 SpO2: 96 % O2 Device: None (Room air)    Vitals:    03/04/25 0915 03/05/25 0821 03/06/25 1212   Weight: 78.7 kg (173 lb 6.4 oz) 78.8 kg (173 lb 11.2 oz) 80.8 kg (178 lb 1.6 oz)     Vital Signs with Ranges  Temp:  [97.4  F (36.3  C)-97.9  F (36.6  C)] 97.9  F (36.6  C)  Pulse:  [74-86] 85  Resp:  [16-20] 16  BP: (102-133)/(59-77) 123/73  SpO2:  [92 %-97 %] 96 %  I/O last 3 completed shifts:  In: 1652.6 [P.O.:1560; I.V.:15; IV Piggyback:77.6]  Out: 2700 [Urine:2700]      Physical Exam:    Blood pressure 123/73, pulse 85, temperature 97.9  F (36.6  C), temperature source Oral, resp. rate 16, height 1.626 m (5' 4\"), weight 80.8 kg (178 lb 1.6 oz), last menstrual period 01/01/2007, SpO2 96%, not currently breastfeeding.    Constitutional: Awake and conversational. Non-toxic appearing. No acute distress.   HEENT: Normocephalic, atraumatic. Sclerae anicteric. Moist mucus membranes without lesions, abscess, or thrush.  Respiratory: Breathing comfortably on room air with no accessory muscle use. Speaking in full sentences, no evidence of respiratory distress. Lungs CTAB, no wheeze or rales.   Cardiovascular: Regular rate and rhythm. Soft systolic murmur consistent with known aortic stenosis.  No peripheral edema.    GI: Abdomen with normoactive bowel sounds, soft, non-distended, and non-tender throughout. No rebound or guarding.   Skin: Skin is clean, dry, intact. No jaundice or significant rashes appreciated.   Neurologic: Alert and oriented with normal speech. Memory and thought process preserved. Moves all extremities spontaneously.   Neuropsychiatric: Calm, " appropriate mood and affect congruent to situation. Good judgment and insight.   Vascular access: PIV. CDI. No evidence of erythema or inflammation.       Medications   Current Facility-Administered Medications   Medication Dose Route Frequency Provider Last Rate Last Admin     Current Facility-Administered Medications   Medication Dose Route Frequency Provider Last Rate Last Admin    acyclovir (ZOVIRAX) tablet 400 mg  400 mg Oral BID Chelsie Murphy PA-C   400 mg at 03/06/25 0905    allopurinol (ZYLOPRIM) tablet 300 mg  300 mg Oral Daily Chelsie Murphy PA-C   300 mg at 03/06/25 0905    Chemotherapy Infusing-Continuous Infusion   Does not apply Q8H Roney Hwang MD 43.8 mL/hr at 03/06/25 0504 Given at 03/06/25 0504    cytarabine (PF) (CYTOSAR) 190 mg in D5W 1,051.9 mL infusion  100 mg/m2 (Treatment Plan Recorded) Intravenous Q24H Roney Hwang MD 43.8 mL/hr at 03/05/25 2213 190 mg at 03/05/25 2213    DAUNOrubicin HCl (CERUBIDINE) 113 mg in sodium chloride 0.9 % 77.6 mL infusion  60 mg/m2 (Treatment Plan Recorded) Intravenous Q24H Roney Hwang .4 mL/hr at 03/05/25 2118 113 mg at 03/05/25 2118    dexAMETHasone (DECADRON) tablet 12 mg  12 mg Oral Q24H Roney Hwang MD   12 mg at 03/05/25 2013    enoxaparin ANTICOAGULANT (LOVENOX) injection 40 mg  40 mg Subcutaneous Q24H Chelsie Murphy PA-C   40 mg at 03/05/25 2013    famotidine (PEPCID) tablet 10 mg  10 mg Oral BID Chelsie Murphy PA-C   10 mg at 03/06/25 0905    fluticasone-vilanterol (BREO ELLIPTA) 100-25 MCG/ACT inhaler 1 puff  1 puff Inhalation Daily Chelsie Murphy PA-C   1 puff at 03/06/25 0902    heparin lock flush 10 unit/mL injection 5-20 mL  5-20 mL Intracatheter Q24H Chelsie Murphy PA-C        heparin lock flush 10 unit/mL injection 5-20 mL  5-20 mL Intracatheter Q24H Chelsie Murphy PA-C        [START ON 3/11/2025] levofloxacin (LEVAQUIN)  tablet 500 mg  500 mg Oral Daily Zully Garcia PA-C        levofloxacin (LEVAQUIN) tablet 750 mg  750 mg Oral Daily Zully Garcia PA-C   750 mg at 03/06/25 0905    lisinopril (ZESTRIL) tablet 10 mg  10 mg Oral Daily Jyoti Tenorio PA-C   10 mg at 03/06/25 0905    loratadine (CLARITIN) tablet 10 mg  10 mg Oral Daily Chelsie Murphy PA-C   10 mg at 03/06/25 0905    micafungin (MYCAMINE) 50 mg in sodium chloride 0.9 % 100 mL intermittent infusion  50 mg Intravenous Q24H Chelsie Murphy PA-C 100 mL/hr at 03/05/25 2108 50 mg at 03/05/25 2108    nicotine (NICODERM CQ) 14 MG/24HR 24 hr patch 1 patch  1 patch Transdermal Daily Chelsie Murphy PA-C   1 patch at 02/28/25 1641    ofloxacin (FLOXIN) 0.3 % otic solution 5 drop  5 drop Left Ear Daily Consuelo Robles MD   5 drop at 03/06/25 0907    ondansetron (ZOFRAN) tablet 8 mg  8 mg Oral Q12H Roney Hwang MD   8 mg at 03/06/25 0904    PARoxetine (PAXIL) tablet 20 mg  20 mg Oral QAM Chelsie Murphy PA-C   20 mg at 03/06/25 0905    sodium chloride (PF) 0.9% PF flush 10-40 mL  10-40 mL Intracatheter Q8H Chelsie Murphy PA-C   10 mL at 03/06/25 0040    sodium chloride (PF) 0.9% PF flush 10-40 mL  10-40 mL Intracatheter Q8H Chelsie Murphy PA-C   10 mL at 03/06/25 0505    sodium chloride (PF) 0.9% PF flush 3 mL  3 mL Intracatheter Q8H Chelsie Murphy PA-C   3 mL at 03/06/25 0910    Vitamin D3 (CHOLECALCIFEROL) tablet 1,000 Units  1,000 Units Oral Daily Chelsie Murphy PA-C   1,000 Units at 03/06/25 0905       Data   Results for orders placed or performed during the hospital encounter of 02/26/25 (from the past 24 hours)   Phosphorus   Result Value Ref Range    Phosphorus 4.3 2.5 - 4.5 mg/dL   Uric acid   Result Value Ref Range    Uric Acid 3.0 2.4 - 5.7 mg/dL   Basic metabolic panel   Result Value Ref Range    Sodium 134 (L) 135 - 145 mmol/L    Potassium 4.3 3.4  - 5.3 mmol/L    Chloride 97 (L) 98 - 107 mmol/L    Carbon Dioxide (CO2) 24 22 - 29 mmol/L    Anion Gap 13 7 - 15 mmol/L    Urea Nitrogen 17.6 6.0 - 20.0 mg/dL    Creatinine 0.86 0.51 - 0.95 mg/dL    GFR Estimate 78 >60 mL/min/1.73m2    Calcium 10.0 8.8 - 10.4 mg/dL    Glucose 123 (H) 70 - 99 mg/dL   [facility]; 8204714; NPM1 Mutation Detection by RT-PCR, Quantitative (Laboratory Miscellaneous Order)   Result Value Ref Range    Specimen Status       Specimen received. Reordered and sent to performing laboratory. Report to follow upon completion.    Performing Laboratory [facility]     Test Name NPM1 Mutation Detection by RT-PCR, Quantitative     Test Code [id number]    Double Lumen PICC Placement    Narrative    Teresa Chavez RN     3/5/2025  8:33 PM  [facility]    Double Lumen PICC Placement    Date/Time: 3/5/2025 8:11 PM    Performed by: Teresa Chavez RN  Authorized by: Chelsie Murphy PA-C  Indications: chemotherapy.      UNIVERSAL PROTOCOL   Site Marked: Yes  Prior Images Obtained and Reviewed:  Yes  Required items: Required blood products, implants, devices and special   equipment available    Patient identity confirmed:  Verbally with patient, arm band, provided   demographic data and hospital-assigned identification number  Patient was reevaluated immediately before administering moderate or deep   sedation or anesthesia  Confirmation Checklist:  Patient's identity using two indicators, relevant   allergies, procedure was appropriate and matched the consent or emergent   situation and correct equipment/implants were available  Time out: Immediately prior to the procedure a time out was called    Universal Protocol: the Joint Commission Universal Protocol was followed    Preparation: Patient was prepped and draped in usual sterile fashion       ANESTHESIA    Anesthesia:  See MAR for details  Local Anesthetic:  Lidocaine 1% without  epinephrine  Anesthetic Total (mL):  2      SEDATION    Patient Sedated: No        Preparation: skin prepped with ChloraPrep  Skin prep agent: skin prep agent completely dried prior to procedure  Sterile barriers: maximum sterile barriers were used: cap, mask, sterile   gown, sterile gloves, and large sterile sheet  Hand hygiene: hand hygiene performed prior to central venous catheter   insertion  Type of line used: PICC  Catheter type: double lumen  Lumen type: non-valved and power PICC  Lumen Identification: Purple and Red  Catheter size: 5 Fr  Brand: Bard  Lot number: [id number]  Placement method: venipuncture, MST, ultrasound and tip navigation system  Number of attempts: 1  Difficulty threading catheter: no  Successful placement: yes  Orientation: right  Catheter to Vein (%): 24  Location: brachial vein (medial) (0.71 CM)  Tip Location: SVC  Arm circumference: adults 10 cm  Extremity circumference: 31  Visible catheter length: 3  Total catheter length: 42  Dressing and securement: alcohol impregnated caps, chlorhexidine disc   applied, gloves changed prior to final dressing, site cleansed, sterile   dressing applied, tissue adhesive, subcutaneous anchor securement system   and transparent dressing  Post procedure assessment: blood return through all ports, free fluid flow   and placement verified by 3CG technology  PROCEDURE Describe Procedure: No immediate concerns post procedure, denies   pain or discomfort.   PICC is verified & ready to use.    This patient's catheter is secured with SecurAcath instead of a   traditional suture or adhesive based securement. This is a subcutaneous   anchor securement system (aka VIELKA or SecurAcath) that holds the catheter   just below the skin with nitinol feet and a friction fit  around the   catheter.  It can stay with the catheter until the catheter is removed.    Do not open, change or remove the SecurAcath.  SecurAcath may be disinfected during a dressing change, but  "do not open or   remove it.  SecurAcath acts like a hinge - lift, clean 360 degrees around, let dry and   apply new dressing.  SecurAcath is compatible with all dressings and antiseptic agents.  Ensure the wings of the catheter and SecurAcath are completely under the   boarder of the dressing.  SecurAcath is MRI safe to 3T, see IFU for details.  A Statlock is not necessary when SecurAcath is present.   Patients can go to MRI with SecurAcath device in place  When the catheter is indicated for removal, enter \"Nursing to Consult for   Vascular Access Care\" order in Pins, watch the removal video on   [employer], or call for training if you have not removed before. 24 hour   SecurAcath Clinical Education and Support  [phone]   Disposal: sharps and needle count correct at the end of procedure, needles   and guidewire disposed in sharps container  Patient Tolerance:  Patient tolerated the procedure well with no immediate   complications   CBC with platelets differential    Narrative    The following orders were created for panel order CBC with platelets differential.  Procedure                               Abnormality         Status                     ---------                               -----------         ------                     CBC with platelets and d...[[id number]]  Abnormal            Final result               Manual Differential[[id number]]          Abnormal            Final result                 Please view results for these tests on the individual orders.   Comprehensive metabolic panel   Result Value Ref Range    Sodium 136 135 - 145 mmol/L    Potassium 4.6 3.4 - 5.3 mmol/L    Carbon Dioxide (CO2) 25 22 - 29 mmol/L    Anion Gap 11 7 - 15 mmol/L    Urea Nitrogen 21.8 (H) 6.0 - 20.0 mg/dL    Creatinine 0.74 0.51 - 0.95 mg/dL    GFR Estimate >90 >60 mL/min/1.73m2    Calcium 9.5 8.8 - 10.4 mg/dL    Chloride 100 98 - 107 mmol/L    Glucose 187 (H) 70 - 99 mg/dL    Alkaline Phosphatase 160 (H) 40 - 150 U/L "    AST 32 0 - 45 U/L    ALT 22 0 - 50 U/L    Protein Total 7.3 6.4 - 8.3 g/dL    Albumin 3.9 3.5 - 5.2 g/dL    Bilirubin Total 0.4 <=1.2 mg/dL   INR   Result Value Ref Range    INR 1.05 0.85 - 1.15   Fibrinogen activity   Result Value Ref Range    Fibrinogen Activity 516 (H) 170 - 510 mg/dL   Partial thromboplastin time   Result Value Ref Range    aPTT 28 22 - 38 Seconds   Magnesium   Result Value Ref Range    Magnesium 2.4 (H) 1.7 - 2.3 mg/dL   Lactate Dehydrogenase (aka LDH)   Result Value Ref Range    Lactate Dehydrogenase 465 (H) 0 - 250 U/L   Phosphorus   Result Value Ref Range    Phosphorus 4.3 2.5 - 4.5 mg/dL   Uric acid   Result Value Ref Range    Uric Acid 3.3 2.4 - 5.7 mg/dL   CBC with platelets and differential   Result Value Ref Range    WBC Count 2.2 (L) 4.0 - 11.0 10e3/uL    RBC Count 2.03 (L) 3.80 - 5.20 10e6/uL    Hemoglobin 7.8 (L) 11.7 - 15.7 g/dL    Hematocrit 23.0 (L) 35.0 - 47.0 %     (H) 78 - 100 fL    MCH 38.4 (H) 26.5 - 33.0 pg    MCHC 33.9 31.5 - 36.5 g/dL    RDW 14.9 10.0 - 15.0 %    Platelet Count 161 150 - 450 10e3/uL   Manual Differential   Result Value Ref Range    % Neutrophils 31 %    % Lymphocytes 53 %    % Monocytes 5 %    % Eosinophils 0 %    % Basophils 1 %    % Blasts 10 %    NRBCs per 100 WBC 6 (H) <=0 %    Absolute Neutrophils 0.7 (L) 1.6 - 8.3 10e3/uL    Absolute Lymphocytes 1.1 0.8 - 5.3 10e3/uL    Absolute Monocytes 0.1 0.0 - 1.3 10e3/uL    Absolute Eosinophils 0.0 0.0 - 0.7 10e3/uL    Absolute Basophils 0.0 0.0 - 0.2 10e3/uL    Absolute Blasts 0.2 (H) <=0.0 10e3/uL    Absolute NRBCs 0.1 (H) <=0.0 10e3/uL    RBC Morphology Confirmed RBC Indices     Platelet Assessment  Automated Count Confirmed. Platelet morphology is normal.     Automated Count Confirmed. Platelet morphology is normal.

## 2025-03-06 NOTE — PLAN OF CARE
Goal Outcome Evaluation:      Plan of Care Reviewed With: patient    Overall Patient Progress: no change    Outcome Evaluation: PICC placed. Pt started 7+3 chemo tonight.    Status:  Admitted to [facility] 2/26 for further workup and management after completing a BMBx from OSH (2/13). Showed hypercellular marrow w/ 4% blasts and NPM1, IDH2, and NRAS mutations. PMHx of COPD, migraines, rheumatoid arthritis, and anxiety. Started 7+3 chemo today. Droplet precautions, + for Influenza A.   Neuros: A&Ox4, able to make needs known. Denies n/t.  Vitals: VSS.   Respiratory: On RA. Denies SOB. Pt expressed wanting to use nicotine patch in the morning now that chemo has started.   Labs: Na - 134. TLS labs now BID.   GI: Regular diet, tolerating well. BS+. LBM 3/5, formed stool per pt.   : Voiding spont. Adequate fluid intake.   Skin: Bruising on abdomen.   IV: PICC on R arm infusing continuous chemotherapy.  Pain: C/o generalized body aches this evening. PRN Tylenol given x1, pain rated 5/10.   Education: Provided education on chemotherapy side effects and pt denies need for further teaching. RN educated pt on not being able to leave the floor once chemo starts and will be on CIVI for 7 days - patient verbalized understanding.   Activity: Up ad vanda.   Plan: First dose of 7+3 chemotherapy given tonight. Continue to monitor UO, symptoms of nausea and screen for toxicity. Continue with POC. Notify provider of any changes to pt status.

## 2025-03-07 LAB
ABO + RH BLD: NORMAL
ALBUMIN SERPL BCG-MCNC: 4 G/DL (ref 3.5–5.2)
ALP SERPL-CCNC: 158 U/L (ref 40–150)
ALT SERPL W P-5'-P-CCNC: 19 U/L (ref 0–50)
ANION GAP SERPL CALCULATED.3IONS-SCNC: 13 MMOL/L (ref 7–15)
APTT PPP: 25 SECONDS (ref 22–38)
AST SERPL W P-5'-P-CCNC: 32 U/L (ref 0–45)
BASOPHILS # BLD AUTO: 0 10E3/UL (ref 0–0.2)
BASOPHILS NFR BLD AUTO: 0 %
BILIRUB SERPL-MCNC: 0.3 MG/DL
BLD GP AB SCN SERPL QL: NEGATIVE
BUN SERPL-MCNC: 20.5 MG/DL (ref 6–20)
CALCIUM SERPL-MCNC: 9.5 MG/DL (ref 8.8–10.4)
CHLORIDE SERPL-SCNC: 100 MMOL/L (ref 98–107)
CREAT SERPL-MCNC: 0.69 MG/DL (ref 0.51–0.95)
EGFRCR SERPLBLD CKD-EPI 2021: >90 ML/MIN/1.73M2
EOSINOPHIL # BLD AUTO: 0 10E3/UL (ref 0–0.7)
EOSINOPHIL NFR BLD AUTO: 0 %
ERYTHROCYTE [DISTWIDTH] IN BLOOD BY AUTOMATED COUNT: 14.9 % (ref 10–15)
FIBRINOGEN PPP-MCNC: 410 MG/DL (ref 170–510)
GLUCOSE SERPL-MCNC: 185 MG/DL (ref 70–99)
HCO3 SERPL-SCNC: 23 MMOL/L (ref 22–29)
HCT VFR BLD AUTO: 22.1 % (ref 35–47)
HGB BLD-MCNC: 7.6 G/DL (ref 11.7–15.7)
IMM GRANULOCYTES # BLD: 0 10E3/UL
IMM GRANULOCYTES NFR BLD: 1 %
INR PPP: 1.05 (ref 0.85–1.15)
LDH SERPL L TO P-CCNC: 465 U/L (ref 0–250)
LYMPHOCYTES # BLD AUTO: 1 10E3/UL (ref 0.8–5.3)
LYMPHOCYTES NFR BLD AUTO: 38 %
MAGNESIUM SERPL-MCNC: 2.5 MG/DL (ref 1.7–2.3)
MCH RBC QN AUTO: 37.8 PG (ref 26.5–33)
MCHC RBC AUTO-ENTMCNC: 34.4 G/DL (ref 31.5–36.5)
MCV RBC AUTO: 110 FL (ref 78–100)
MONOCYTES # BLD AUTO: 0.6 10E3/UL (ref 0–1.3)
MONOCYTES NFR BLD AUTO: 20 %
NEUTROPHILS # BLD AUTO: 1.1 10E3/UL (ref 1.6–8.3)
NEUTROPHILS NFR BLD AUTO: 41 %
NRBC # BLD AUTO: 0.1 10E3/UL
NRBC BLD AUTO-RTO: 5 /100
PHOSPHATE SERPL-MCNC: 4.1 MG/DL (ref 2.5–4.5)
PLATELET # BLD AUTO: 153 10E3/UL (ref 150–450)
POTASSIUM SERPL-SCNC: 4.6 MMOL/L (ref 3.4–5.3)
PROT SERPL-MCNC: 7.2 G/DL (ref 6.4–8.3)
RBC # BLD AUTO: 2.01 10E6/UL (ref 3.8–5.2)
SODIUM SERPL-SCNC: 136 MMOL/L (ref 135–145)
SPECIMEN EXP DATE BLD: NORMAL
URATE SERPL-MCNC: 4.1 MG/DL (ref 2.4–5.7)
WBC # BLD AUTO: 2.8 10E3/UL (ref 4–11)

## 2025-03-07 PROCEDURE — 85025 COMPLETE CBC W/AUTO DIFF WBC: CPT

## 2025-03-07 PROCEDURE — 86923 COMPATIBILITY TEST ELECTRIC: CPT

## 2025-03-07 PROCEDURE — 250N000013 HC RX MED GY IP 250 OP 250 PS 637

## 2025-03-07 PROCEDURE — 250N000011 HC RX IP 250 OP 636: Mod: JZ | Performed by: INTERNAL MEDICINE

## 2025-03-07 PROCEDURE — 250N000011 HC RX IP 250 OP 636

## 2025-03-07 PROCEDURE — 83735 ASSAY OF MAGNESIUM: CPT

## 2025-03-07 PROCEDURE — 86900 BLOOD TYPING SEROLOGIC ABO: CPT

## 2025-03-07 PROCEDURE — 84550 ASSAY OF BLOOD/URIC ACID: CPT

## 2025-03-07 PROCEDURE — 83615 LACTATE (LD) (LDH) ENZYME: CPT

## 2025-03-07 PROCEDURE — 85730 THROMBOPLASTIN TIME PARTIAL: CPT

## 2025-03-07 PROCEDURE — 250N000012 HC RX MED GY IP 250 OP 636 PS 637: Performed by: INTERNAL MEDICINE

## 2025-03-07 PROCEDURE — 120N000002 HC R&B MED SURG/OB UMMC

## 2025-03-07 PROCEDURE — 258N000003 HC RX IP 258 OP 636

## 2025-03-07 PROCEDURE — 99418 PROLNG IP/OBS E/M EA 15 MIN: CPT | Performed by: INTERNAL MEDICINE

## 2025-03-07 PROCEDURE — 85610 PROTHROMBIN TIME: CPT

## 2025-03-07 PROCEDURE — 84100 ASSAY OF PHOSPHORUS: CPT

## 2025-03-07 PROCEDURE — 80053 COMPREHEN METABOLIC PANEL: CPT

## 2025-03-07 PROCEDURE — 250N000013 HC RX MED GY IP 250 OP 250 PS 637: Performed by: PHYSICIAN ASSISTANT

## 2025-03-07 PROCEDURE — 258N000003 HC RX IP 258 OP 636: Performed by: INTERNAL MEDICINE

## 2025-03-07 PROCEDURE — 99233 SBSQ HOSP IP/OBS HIGH 50: CPT | Mod: FS | Performed by: INTERNAL MEDICINE

## 2025-03-07 PROCEDURE — 85384 FIBRINOGEN ACTIVITY: CPT

## 2025-03-07 RX ADMIN — FAMOTIDINE 10 MG: 10 TABLET ORAL at 09:18

## 2025-03-07 RX ADMIN — ACETAMINOPHEN 650 MG: 325 TABLET, FILM COATED ORAL at 21:01

## 2025-03-07 RX ADMIN — ACYCLOVIR 400 MG: 400 TABLET ORAL at 19:17

## 2025-03-07 RX ADMIN — LEVOFLOXACIN 750 MG: 750 TABLET, FILM COATED ORAL at 09:18

## 2025-03-07 RX ADMIN — ACYCLOVIR 400 MG: 400 TABLET ORAL at 09:18

## 2025-03-07 RX ADMIN — Medication 1000 UNITS: at 09:19

## 2025-03-07 RX ADMIN — FAMOTIDINE 10 MG: 10 TABLET ORAL at 19:17

## 2025-03-07 RX ADMIN — LISINOPRIL 10 MG: 10 TABLET ORAL at 09:17

## 2025-03-07 RX ADMIN — ACETAMINOPHEN 650 MG: 325 TABLET, FILM COATED ORAL at 13:42

## 2025-03-07 RX ADMIN — FLUTICASONE FUROATE AND VILANTEROL TRIFENATATE 1 PUFF: 100; 25 POWDER RESPIRATORY (INHALATION) at 09:18

## 2025-03-07 RX ADMIN — ENOXAPARIN SODIUM 40 MG: 40 INJECTION SUBCUTANEOUS at 19:17

## 2025-03-07 RX ADMIN — ALLOPURINOL 300 MG: 300 TABLET ORAL at 09:18

## 2025-03-07 RX ADMIN — DEXAMETHASONE 12 MG: 4 TABLET ORAL at 19:17

## 2025-03-07 RX ADMIN — CYTARABINE 190 MG: 100 INJECTION, SOLUTION INTRATHECAL; INTRAVENOUS; SUBCUTANEOUS at 01:12

## 2025-03-07 RX ADMIN — OFLOXACIN 5 DROP: 3 SOLUTION AURICULAR (OTIC) at 09:19

## 2025-03-07 RX ADMIN — MICAFUNGIN SODIUM 50 MG: 50 INJECTION, POWDER, LYOPHILIZED, FOR SOLUTION INTRAVENOUS at 20:01

## 2025-03-07 RX ADMIN — Medication 5 ML: at 21:37

## 2025-03-07 RX ADMIN — DAUNORUBICIN HYDROCHLORIDE 113 MG: 5 INJECTION, SOLUTION INTRAVENOUS at 21:02

## 2025-03-07 RX ADMIN — NICOTINE 1 PATCH: 14 PATCH, EXTENDED RELEASE TRANSDERMAL at 09:23

## 2025-03-07 RX ADMIN — HEPARIN, PORCINE (PF) 10 UNIT/ML INTRAVENOUS SYRINGE 5 ML: at 05:49

## 2025-03-07 RX ADMIN — PAROXETINE HYDROCHLORIDE 20 MG: 20 TABLET, FILM COATED ORAL at 09:17

## 2025-03-07 RX ADMIN — ONDANSETRON HYDROCHLORIDE 8 MG: 8 TABLET, FILM COATED ORAL at 19:17

## 2025-03-07 RX ADMIN — ONDANSETRON HYDROCHLORIDE 8 MG: 8 TABLET, FILM COATED ORAL at 09:18

## 2025-03-07 RX ADMIN — LORATADINE 10 MG: 10 TABLET ORAL at 09:18

## 2025-03-07 RX ADMIN — FLUTICASONE PROPIONATE 2 SPRAY: 50 SPRAY, METERED NASAL at 09:20

## 2025-03-07 ASSESSMENT — ACTIVITIES OF DAILY LIVING (ADL)
ADLS_ACUITY_SCORE: 33
ADLS_ACUITY_SCORE: 35
ADLS_ACUITY_SCORE: 35
ADLS_ACUITY_SCORE: 33
ADLS_ACUITY_SCORE: 33
ADLS_ACUITY_SCORE: 35
ADLS_ACUITY_SCORE: 35
ADLS_ACUITY_SCORE: 33
ADLS_ACUITY_SCORE: 35
ADLS_ACUITY_SCORE: 33
ADLS_ACUITY_SCORE: 35
ADLS_ACUITY_SCORE: 35
ADLS_ACUITY_SCORE: 33

## 2025-03-07 NOTE — PLAN OF CARE
Goal Outcome Evaluation:      Plan of Care Reviewed With: patient    Overall Patient Progress: no changeOverall Patient Progress: no change    Outcome Evaluation: D2 chemo hung; nicotine patch placed Left deltoid, pt walking halls frequently, has agreed not to leave the unit    AVSS, D2 cytarabine delayed as D1 is not yet finished; passing on to nights to hang, should be finished within an hour.

## 2025-03-07 NOTE — PROGRESS NOTES
Nursing Focus: Chemotherapy  D: Positive blood return via PICC . Insertion site is clean/dry/intact, dressing intact with no complaints of pain.  Pre infusion assessment documented in Flowsheet (if applicable).    I: Premedications given per order (see electronic medical administration record). Dose #2 of Daunorubicin infused over 15 minutes. Reviewed pt teaching on chemotherapy side effects.  Pt denies need for further teaching. Chemotherapy double checked per protocol by two chemotherapy competent RN's.   A: Tolerating infusion well. Denies nausea and or pain.   P: Continue to monitor urine output and symptoms of nausea. Screen for symptoms of toxicity.

## 2025-03-07 NOTE — PROGRESS NOTES
[facility]    Hematology / Oncology Progress Note  Hospital Day # 9  Date of Service (when I saw the patient): 03/07/2025     Assessment & Plan   Alejandra Villegas is a 59 year old female with a past medical history significant for COPD, CKD, migraines, rheumatoid arthritis, aortic stenosis, Afib, CHF, and anxiety who presented to an outside hospital with cytopenias and was found on BMBx to have hypercellular marrow with 4% blasts, 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 She was subsequently admitted to [facility] on 2/26/25 for further work-up and management and pathology re-review was most consistent with AML with NPM1 mutation by WHO 2022 (which does not require a specific blast threshold). Following further multidisciplinary discussion, she was started on intensive induction with 7+3 (D1=3/5/25).  Her course has been complicated by neutropenic fevers, influenza A, and AOM.     Today:  - D3 from 7+3 induction.  - Complete course of ofloxacin drops for left TM perforation.  - Continue course of PO levofloxacin 750 mg daily through 3/10/25.  - Back off to daily labs given stability.    HEME  # AML with NPM1 mutation  Had been seen by PCP Dec 2024 w/ progressive fatigue and nausea where she was found leukopenic WBC  2.4 and neutropenic w/ ANC 0.3. Hgb 12. Was referred to local hematology/oncology clinic for further evaluation and seen by Dr. Harris. BMBx 2/10/25 done at OSH showed hypercellular marrow w/ trilineage hematopoiesis w/ dyspoiesis with mildly elevated blasts of 4% on morphology. NGS w/ NPM1, IDH2, and NRAS mutations. She was admitted to [facility] 2/26/25 for further workup and management. Slides were re-reviewed by [facility] Hematopathology, who noted hypercellular marrow with 8% blasts by morphology. Despite relatively low blast percentage, findings were felt most consistent with a diagnosis AML with NPM1 mutation by WHO 2022 (which does not require  a specific blast threshold). Following interdepartmental discussions and review of the available literature, patient was started on intensive induction with 7+3 (Day 1=3/5/25).   - Baseline cardiopulmonary studies:  - Echo w/ EF 60-65%, mild known aortic stenosis.   - EKG (2/27) with NSR, QTc 412  - CXR (2/26) with mildly increased streaky bibasilar opacities, atelectasis or edema. No consolidation.   - Baseline viral serologies: CMV IgG+, EBV IgG+, HepB sAg-, HepB cAb-, HepB sAb-, HSV1+/2+, HIV-  - HLA Typing sent on admission. Message sent to notify BMT team x2/27 for IP consult.   - PICC placed 3/5/2025.        Treatment plan: 7+3 (C1D1=3/5/2025)    - Daunorubicin 60 mg/m  IV D1-3    - Cytarabine 100 mg/m  IV D1-7      - Pre-medications: zofran, dexamethasone 12 mg D1-3     # Anemia  # Leukopenia  # Neutropenia  Secondary to underlying hematologic malignancy and exacerbated by recent chemotherapy.  - Follow daily CBC with differential  - Transfuse to keep Hgb >7, plt >10K  - Blood consent obtained 2/26/25 on admission and placed in patient's paper chart.      # Risk for TLS  # Hyperphosphatemia, resolved  Secondary to hematologic malignancy, no evidence of TLS on admission.   - Allopurinol 300 mg daily x7 days (last dose 3/11), stop date placed  - Follow TLS labs daily    # Risk for DIC  Secondary to underlying hematologic malignancy.  - Follow coags every MWF  - Transfuse cryo to keep fibrinogen >100, FFP to keep INR <1.8     ID  # ID prophylaxis  - Acyclovir 400 mg BID  - Levaquin 500 mg daily, increased to 750 mg while covering AOM as below  - Micafungin 50 mg daily; anticipate rotating to azole on ~D8 (3/12/25)  - PLC placed on admission for posa/vori coverage; vori w/ $1.60 copay, posa requiring PA.      # Neutropenic fever, fever resolved  # Influenza A  On admission, patient noted subjective fever with chills and rhinitis beginning 2/25 PM prior to admission. No other localizing s/sx of infection. She was  afebrile on admission but spiked a low-grade fever to 100.7 on 2/26 PM. Blood and urine cultures negative, CXR with streaky opacities but no burt consolidation. Work-up positive for influenza A, and she completed a course of Tamiflu 75 mg BID x5 days (2/26-3/3). She also received a short empiric course of IV cefepime (2/26-3/1) given concurrent neutropenia.  - Continue droplet precautions   - Monitor for recurrent fevers    # R AOM with purulent effusion  # Small L TM perforation  Patient noted B/L ear pain, L>R s/p drainage from R side on 3/3. Noted h/o recurrent AOMs. On exam, TMs appear full with erythema to outer edges, patient noted pain w/ exam though able to tolerate. Small TM perforation noted to L side. Query if perforation from recurrent AOM or 2/2 congestion and fluid from recent influenza.  - ENT consulted, appreciate recs:  - Recommended 7-day course Augmentin (per cross-cover discussion with pharmacy, given h/o allergic reaction to Augmentin, increased dose of levofloxacin from 500 mg ? 750 mg daily x7 days (3/5-3/11).   - Recommended 5 day course of floxin drops to left ear for perforation (3/3-3/7)  - Follow up w/ PCP for left TM perforation - if persistent in 6-8 weeks recommend outpt ENT referral.     GI/  # Nausea/vomiting, recurrent/intermittent/resolved  Patient noted ongoing nausea w/ occasional vomiting starting Dec 2024. Has been managed with PRN Zofran.  Endorsed nausea on admission, now resolved.   - PRN Zofran and compazine  - Could consider trial of PRN Zyprexa if 3rd agent is desired/required     # Urinary frequency, resolved  # Dysuria, resolved  On admission, patient noted longstanding history of urinary frequency though new mild dysuria. Denies hematuria, bladder tenderness of CVA tenderness. UA (2/26) negative, UC with no growth. Symptoms have since resolved.     # GERD  Reported recent increase in symptoms in the days leading up to admission.  - Famotidine 10 mg BID     PULM  #  COPD  Longstanding history of COPD. On admission patient reports symptoms are manageable and no recent exacerbations. Most recent PFTs (2018) were normal.  - Continue PTA Breo Ellipta inhaler and PRN DuoNebs      CV  # Essential hypertension  - Continue PTA lisinopril     # Mixed hyperlipidemia  Lipid panel has remained at goal, 1/14/25 panel w/ cholesterol 163, , LDL 92, HDL 45.   - Held PTA atorvastatin on admission due to chemotherapy interactions; will also likely have DDI with azole once started. Consider rotation to rosuvastatin for better drug-drug interaction profile.     # H/o aortic stenosis  Patient noted on admission longstanding history of aortic stenosis. Pre-chemo echo w/ EF 60-65% and mild aortic stenosis and insufficiency. No previous echo to compare.      RENAL  # Stage 2 CKD  Baseline Cr ~ 0.7. On admission Cr 0.77.  - Continue to trend on daily labs and encourage PO hydration     NEURO  # Degenerative disc disease, L5-S1  - Continue PTA gabapentin     # Chronic migraine w/o aura  Present since childhood. Reportedly triggered by neck manipulation/movement. Reportedly well-managed with regimen below.  - Continue PTA sumatriptan and cyclobenzaprine PRN     ENDO  # Prediabetes  Last A1c 6.0 x12/9/24. Has been managing with lifestyle modifications.      MISC  # Rheumatoid arthritis   Patient reported trying treatment though was unable to tolerate well. Managed with Tylenol PRN. Most recnet RF >650 (2/10/25). JI negative.       # BROOKS  # MDD  # At risk for adjustment disorder  Patient reports good control of anxiety/depressive symptoms prior to admission. Did note feeling overwhelmed by new diagnosis. On admission discussed inpatient services such as health psychology or cordelia, she respectfully declined though will consider.   - Readdress health psych consult as indicated  - Continue PTA paroxetine      # Vitamin D deficiency  Continue PTA vit D supplement    # Seasonal allergies - Claritin 10  "mg daily     # Tobacco use   Has smoked since age 16, 1.5 ppd (roughly 65 pack years). Attempting to cut back as recently as 01/2025.   - Encourage smoking cessation  - Declined NRT on admission; continue to offer as indicated    Fluids/Electrolytes/Nutrition   - IVF prn   - PRN lyte replacement per standing protocol  - Regular diet as tolerated     Lines: PICC    PPX  VTE: Lovenox, hold for plts < 50k  GI: Famotidine 10 mg BID  Bowels: prn senna and miralax  Activity: as tolerated    Code: DNR/DNI    Medically Ready for Discharge: Anticipated in 5+ Days    Disposition: Admitted for further workup and management. Discharge pending treatment decisions and clinical course.   Follow-up: Will need to request APPT18 closer to discharge date and determine primary oncologist.     75 MINUTES SPENT BY ME on the date of service doing chart review, history, exam, documentation & further activities per the note.      Staffed with Dr. Hwang.    Chelsie Davila PA-C  Hematology/Oncology  Pager: #[phone]     Interval History   No acute overnight events. Nursing notes reviewed. Alejandra (tells me she likes to go by \"V\") reports doing well today. Tolerating chemo without nausea, vomiting, diarrhea, mucositis, N/T, or other concerns. Good energy and appetite. Somewhat bored, struggling with not being able to go outside but understanding. Patient denies headache, neck pain, cough, CP, SOB, abdominal pain, rashes, or edema. In good spirits overall, talked for a bit about her family. Notes that she had a son who passed away in the medical ICU here so being at [facility] is challenging for her; support provided. All questions answered.    A comprehensive ROS was performed and was negative except as detailed in the HPI above.     Physical Exam   Temp: 98.1  F (36.7  C) Temp src: Oral BP: 95/60 Pulse: 91   Resp: 16 SpO2: 95 % O2 Device: None (Room air)    Vitals:    03/05/25 0821 03/06/25 1212 03/07/25 1122   Weight: 78.8 kg (173 lb 11.2 " "oz) 80.8 kg (178 lb 1.6 oz) 80.6 kg (177 lb 9.6 oz)     Vital Signs with Ranges  Temp:  [97.5  F (36.4  C)-98.1  F (36.7  C)] 98.1  F (36.7  C)  Pulse:  [74-95] 91  Resp:  [16] 16  BP: ()/(54-74) 95/60  SpO2:  [94 %-97 %] 95 %  I/O last 3 completed shifts:  In: 436 [P.O.:436]  Out: 2950 [Urine:2950]      Physical Exam:    Blood pressure 95/60, pulse 91, temperature 98.1  F (36.7  C), temperature source Oral, resp. rate 16, height 1.626 m (5' 4\"), weight 80.6 kg (177 lb 9.6 oz), last menstrual period 01/01/2007, SpO2 95%, not currently breastfeeding.    Constitutional: Pleasant and cooperative female seen seated in bed, in NAD. Awake, alert, interactive, non-toxic. Smiling and conversational.  HEENT: NC/AT, sclera clear, conjunctiva normal, OP with MMM, no discrete intraoral lesions or thrush, poor dentition  Respiratory: No increased work of breathing, CTAB, no crackles or wheezing.  Cardiovascular: RRR, harsh blowing systolic murmur (I-II/VI) heard at the LUSB. No peripheral edema. No calf tenderness or palpable cords.  GI: Normal bowel sounds. Abdomen is soft, non-distended and non-tender to palpation.  Skin: Warm, dry, well-perfused. No bruising, bleeding, rashes, or lesions on limited exam.  Musculoskeletal: Diminished muscle bulk, no gross deformities.  Neurologic: A&O. Answers questions appropriately, speech normal. Moves all extremities spontaneously.  Psychiatric: Normal mood and affect.  Vascular access:  PICC on RUE CDI, non-tender, no surrounding erythema.    Medications   Current Facility-Administered Medications   Medication Dose Route Frequency Provider Last Rate Last Admin     Current Facility-Administered Medications   Medication Dose Route Frequency Provider Last Rate Last Admin    acyclovir (ZOVIRAX) tablet 400 mg  400 mg Oral BID Chelsie Murphy PA-C   400 mg at 03/07/25 0918    allopurinol (ZYLOPRIM) tablet 300 mg  300 mg Oral Daily Chelsie Murphy PA-C   300 mg at " 03/07/25 0918    Chemotherapy Infusing-Continuous Infusion   Does not apply Q8H Roney Hwang MD 43.8 mL/hr at 03/07/25 1341 Given at 03/07/25 1341    cytarabine (PF) (CYTOSAR) 190 mg in D5W 1,051.9 mL infusion  100 mg/m2 (Treatment Plan Recorded) Intravenous Q24H Roney Hwang MD 43.8 mL/hr at 03/07/25 0112 190 mg at 03/07/25 0112    DAUNOrubicin HCl (CERUBIDINE) 113 mg in sodium chloride 0.9 % 77.6 mL infusion  60 mg/m2 (Treatment Plan Recorded) Intravenous Q24H Roney Hwang .4 mL/hr at 03/06/25 2124 113 mg at 03/06/25 2124    dexAMETHasone (DECADRON) tablet 12 mg  12 mg Oral Q24H Roney Hwang MD   12 mg at 03/06/25 1916    enoxaparin ANTICOAGULANT (LOVENOX) injection 40 mg  40 mg Subcutaneous Q24H Chelsie Murphy PA-C   40 mg at 03/06/25 1919    famotidine (PEPCID) tablet 10 mg  10 mg Oral BID Chelsie Murphy PA-C   10 mg at 03/07/25 0918    fluticasone-vilanterol (BREO ELLIPTA) 100-25 MCG/ACT inhaler 1 puff  1 puff Inhalation Daily Chelsie Murphy PA-C   1 puff at 03/07/25 0918    heparin lock flush 10 unit/mL injection 5-20 mL  5-20 mL Intracatheter Q24H Chelsie Murphy PA-C   5 mL at 03/06/25 1805    heparin lock flush 10 unit/mL injection 5-20 mL  5-20 mL Intracatheter Q24H Chelsie Murphy PA-C        [START ON 3/11/2025] levofloxacin (LEVAQUIN) tablet 500 mg  500 mg Oral Daily Zully Garcia PA-C        levofloxacin (LEVAQUIN) tablet 750 mg  750 mg Oral Daily Zully Garcia PA-C   750 mg at 03/07/25 0918    lisinopril (ZESTRIL) tablet 10 mg  10 mg Oral Daily Jyoti Tenorio PA-C   10 mg at 03/07/25 0917    loratadine (CLARITIN) tablet 10 mg  10 mg Oral Daily Chelsie Murphy PA-C   10 mg at 03/07/25 0918    micafungin (MYCAMINE) 50 mg in sodium chloride 0.9 % 100 mL intermittent infusion  50 mg Intravenous Q24H Chelsie Murphy PA-C 100 mL/hr at 03/06/25 1918 50 mg at 03/06/25 1918     nicotine (NICODERM CQ) 14 MG/24HR 24 hr patch 1 patch  1 patch Transdermal Daily Chelsie Murphy PA-C   1 patch at 03/07/25 0923    ondansetron (ZOFRAN) tablet 8 mg  8 mg Oral Q12H Roney Hwang MD   8 mg at 03/07/25 0918    PARoxetine (PAXIL) tablet 20 mg  20 mg Oral QAM Chelsie Murphy PA-C   20 mg at 03/07/25 0917    sodium chloride (PF) 0.9% PF flush 10-40 mL  10-40 mL Intracatheter Q8H Chelsie Murphy PA-C   10 mL at 03/07/25 0920    Vitamin D3 (CHOLECALCIFEROL) tablet 1,000 Units  1,000 Units Oral Daily Chelsie Murphy PA-C   1,000 Units at 03/07/25 0919       Data   Results for orders placed or performed during the hospital encounter of 02/26/25 (from the past 24 hours)   Phosphorus   Result Value Ref Range    Phosphorus 4.3 2.5 - 4.5 mg/dL   Uric acid   Result Value Ref Range    Uric Acid 3.2 2.4 - 5.7 mg/dL   Basic metabolic panel   Result Value Ref Range    Sodium 134 (L) 135 - 145 mmol/L    Potassium 4.8 3.4 - 5.3 mmol/L    Chloride 96 (L) 98 - 107 mmol/L    Carbon Dioxide (CO2) 25 22 - 29 mmol/L    Anion Gap 13 7 - 15 mmol/L    Urea Nitrogen 22.8 (H) 6.0 - 20.0 mg/dL    Creatinine 0.80 0.51 - 0.95 mg/dL    GFR Estimate 85 >60 mL/min/1.73m2    Calcium 9.7 8.8 - 10.4 mg/dL    Glucose 140 (H) 70 - 99 mg/dL   CBC with platelets differential    Narrative    The following orders were created for panel order CBC with platelets differential.  Procedure                               Abnormality         Status                     ---------                               -----------         ------                     CBC with platelets and ...[[id number]]  Abnormal            Final result               RBC and Platelet Morpho...[[id number]]                                                   Please view results for these tests on the individual orders.   ABO/Rh type and screen    Narrative    The following orders were created for panel order ABO/Rh type and  screen.  Procedure                               Abnormality         Status                     ---------                               -----------         ------                     Adult Type and Screen[[id number]]                           Final result                 Please view results for these tests on the individual orders.   Comprehensive metabolic panel   Result Value Ref Range    Sodium 136 135 - 145 mmol/L    Potassium 4.6 3.4 - 5.3 mmol/L    Carbon Dioxide (CO2) 23 22 - 29 mmol/L    Anion Gap 13 7 - 15 mmol/L    Urea Nitrogen 20.5 (H) 6.0 - 20.0 mg/dL    Creatinine 0.69 0.51 - 0.95 mg/dL    GFR Estimate >90 >60 mL/min/1.73m2    Calcium 9.5 8.8 - 10.4 mg/dL    Chloride 100 98 - 107 mmol/L    Glucose 185 (H) 70 - 99 mg/dL    Alkaline Phosphatase 158 (H) 40 - 150 U/L    AST 32 0 - 45 U/L    ALT 19 0 - 50 U/L    Protein Total 7.2 6.4 - 8.3 g/dL    Albumin 4.0 3.5 - 5.2 g/dL    Bilirubin Total 0.3 <=1.2 mg/dL   INR   Result Value Ref Range    INR 1.05 0.85 - 1.15   Fibrinogen activity   Result Value Ref Range    Fibrinogen Activity 410 170 - 510 mg/dL   Partial thromboplastin time   Result Value Ref Range    aPTT 25 22 - 38 Seconds   Magnesium   Result Value Ref Range    Magnesium 2.5 (H) 1.7 - 2.3 mg/dL   Lactate Dehydrogenase (aka LDH)   Result Value Ref Range    Lactate Dehydrogenase 465 (H) 0 - 250 U/L   Phosphorus   Result Value Ref Range    Phosphorus 4.1 2.5 - 4.5 mg/dL   Uric acid   Result Value Ref Range    Uric Acid 4.1 2.4 - 5.7 mg/dL   CBC with platelets and differential   Result Value Ref Range    WBC Count 2.8 (L) 4.0 - 11.0 10e3/uL    RBC Count 2.01 (L) 3.80 - 5.20 10e6/uL    Hemoglobin 7.6 (L) 11.7 - 15.7 g/dL    Hematocrit 22.1 (L) 35.0 - 47.0 %     (H) 78 - 100 fL    MCH 37.8 (H) 26.5 - 33.0 pg    MCHC 34.4 31.5 - 36.5 g/dL    RDW 14.9 10.0 - 15.0 %    Platelet Count 153 150 - 450 10e3/uL    % Neutrophils 41 %    % Lymphocytes 38 %    % Monocytes 20 %    % Eosinophils 0 %    %  Basophils 0 %    % Immature Granulocytes 1 %    NRBCs per 100 WBC 5 (H) <1 /100    Absolute Neutrophils 1.1 (L) 1.6 - 8.3 10e3/uL    Absolute Lymphocytes 1.0 0.8 - 5.3 10e3/uL    Absolute Monocytes 0.6 0.0 - 1.3 10e3/uL    Absolute Eosinophils 0.0 0.0 - 0.7 10e3/uL    Absolute Basophils 0.0 0.0 - 0.2 10e3/uL    Absolute Immature Granulocytes 0.0 <=0.4 10e3/uL    Absolute NRBCs 0.1 10e3/uL   Adult Type and Screen   Result Value Ref Range    ABO/RH(D) A POS     Antibody Screen Negative Negative    SPECIMEN EXPIRATION DATE [id number]

## 2025-03-07 NOTE — PROGRESS NOTES
Nursing Focus: Chemotherapy  D: Positive blood return via PICC . Insertion site is clean/dry/intact, dressing intact with no complaints of pain.  Pre infusion assessment documented in Flowsheet (if applicable).    I: Premedications given per order (see electronic medical administration record). Dose #2 of cytarabine started to infuse over 24 hours. Reviewed pt teaching on chemotherapy side effects.  Pt denies need for further teaching. Chemotherapy double checked per protocol by two chemotherapy competent RN's.   A: Tolerating infusion well. Denies nausea and or pain.   P: Continue to monitor urine output and symptoms of nausea. Screen for symptoms of toxicity.

## 2025-03-07 NOTE — PLAN OF CARE
"Goal Outcome Evaluation:      Plan of Care Reviewed With: patient    Overall Patient Progress: no changeOverall Patient Progress: no change    Outcome Evaluation: No s/s of chemo toxicity    2300- 0700     /54   Pulse 79   Temp 97.8  F (36.6  C) (Oral)   Resp 16   Ht 1.626 m (5' 4\")   Wt 80.8 kg (178 lb 1.6 oz)   LMP 01/01/2007 (Approximate)   SpO2 94%   BMI 30.57 kg/m       Reason for admission: 2/26/25 for further workup and management of AML  Activity: UAL  Pain: Denies  Neuro: AOX4  Cardiac: WDL  Respiratory: RA, no s/s of distress  GI/: Denies nausea, voiding with adequate output. No BM this shift  Diet: Regular  Lines: DL PICC-  red infusing, purple hep locked  Wounds: No new skin issues  Labs/imaging: Reviewed      No acute changes this shift. Dose #2 cytarabine infusing, tolerating well     Plan: Discharge pending chemo completion and tolerance      Continue to monitor and follow POC     "

## 2025-03-08 LAB
ALBUMIN SERPL BCG-MCNC: 4 G/DL (ref 3.5–5.2)
ALP SERPL-CCNC: 160 U/L (ref 40–150)
ALT SERPL W P-5'-P-CCNC: 18 U/L (ref 0–50)
ANION GAP SERPL CALCULATED.3IONS-SCNC: 14 MMOL/L (ref 7–15)
AST SERPL W P-5'-P-CCNC: 30 U/L (ref 0–45)
BASOPHILS # BLD AUTO: 0 10E3/UL (ref 0–0.2)
BASOPHILS NFR BLD AUTO: 0 %
BILIRUB SERPL-MCNC: 0.4 MG/DL
BUN SERPL-MCNC: 22.9 MG/DL (ref 6–20)
CALCIUM SERPL-MCNC: 9 MG/DL (ref 8.8–10.4)
CHLORIDE SERPL-SCNC: 101 MMOL/L (ref 98–107)
CREAT SERPL-MCNC: 0.68 MG/DL (ref 0.51–0.95)
EGFRCR SERPLBLD CKD-EPI 2021: >90 ML/MIN/1.73M2
EOSINOPHIL # BLD AUTO: 0 10E3/UL (ref 0–0.7)
EOSINOPHIL NFR BLD AUTO: 0 %
ERYTHROCYTE [DISTWIDTH] IN BLOOD BY AUTOMATED COUNT: 15 % (ref 10–15)
GLUCOSE SERPL-MCNC: 154 MG/DL (ref 70–99)
HCO3 SERPL-SCNC: 20 MMOL/L (ref 22–29)
HCT VFR BLD AUTO: 21.8 % (ref 35–47)
HGB BLD-MCNC: 7.7 G/DL (ref 11.7–15.7)
IMM GRANULOCYTES # BLD: 0 10E3/UL
IMM GRANULOCYTES NFR BLD: 1 %
LDH SERPL L TO P-CCNC: 430 U/L (ref 0–250)
LYMPHOCYTES # BLD AUTO: 0.5 10E3/UL (ref 0.8–5.3)
LYMPHOCYTES NFR BLD AUTO: 33 %
MAGNESIUM SERPL-MCNC: 2.6 MG/DL (ref 1.7–2.3)
MCH RBC QN AUTO: 38.9 PG (ref 26.5–33)
MCHC RBC AUTO-ENTMCNC: 35.3 G/DL (ref 31.5–36.5)
MCV RBC AUTO: 110 FL (ref 78–100)
MONOCYTES # BLD AUTO: 0.1 10E3/UL (ref 0–1.3)
MONOCYTES NFR BLD AUTO: 8 %
NEUTROPHILS # BLD AUTO: 0.8 10E3/UL (ref 1.6–8.3)
NEUTROPHILS NFR BLD AUTO: 58 %
NRBC # BLD AUTO: 0 10E3/UL
NRBC BLD AUTO-RTO: 2 /100
PHOSPHATE SERPL-MCNC: 4.5 MG/DL (ref 2.5–4.5)
PLATELET # BLD AUTO: 148 10E3/UL (ref 150–450)
POTASSIUM SERPL-SCNC: 4.7 MMOL/L (ref 3.4–5.3)
PROT SERPL-MCNC: 7.1 G/DL (ref 6.4–8.3)
RBC # BLD AUTO: 1.98 10E6/UL (ref 3.8–5.2)
SODIUM SERPL-SCNC: 135 MMOL/L (ref 135–145)
URATE SERPL-MCNC: 4.3 MG/DL (ref 2.4–5.7)
WBC # BLD AUTO: 1.4 10E3/UL (ref 4–11)

## 2025-03-08 PROCEDURE — 99233 SBSQ HOSP IP/OBS HIGH 50: CPT | Mod: FS

## 2025-03-08 PROCEDURE — 83615 LACTATE (LD) (LDH) ENZYME: CPT

## 2025-03-08 PROCEDURE — 258N000003 HC RX IP 258 OP 636: Performed by: INTERNAL MEDICINE

## 2025-03-08 PROCEDURE — 83735 ASSAY OF MAGNESIUM: CPT

## 2025-03-08 PROCEDURE — 85004 AUTOMATED DIFF WBC COUNT: CPT

## 2025-03-08 PROCEDURE — 84100 ASSAY OF PHOSPHORUS: CPT | Performed by: STUDENT IN AN ORGANIZED HEALTH CARE EDUCATION/TRAINING PROGRAM

## 2025-03-08 PROCEDURE — 250N000013 HC RX MED GY IP 250 OP 250 PS 637

## 2025-03-08 PROCEDURE — 250N000013 HC RX MED GY IP 250 OP 250 PS 637: Performed by: PHYSICIAN ASSISTANT

## 2025-03-08 PROCEDURE — 84132 ASSAY OF SERUM POTASSIUM: CPT

## 2025-03-08 PROCEDURE — 250N000011 HC RX IP 250 OP 636: Performed by: INTERNAL MEDICINE

## 2025-03-08 PROCEDURE — 85041 AUTOMATED RBC COUNT: CPT

## 2025-03-08 PROCEDURE — 258N000003 HC RX IP 258 OP 636

## 2025-03-08 PROCEDURE — 250N000013 HC RX MED GY IP 250 OP 250 PS 637: Performed by: STUDENT IN AN ORGANIZED HEALTH CARE EDUCATION/TRAINING PROGRAM

## 2025-03-08 PROCEDURE — 120N000002 HC R&B MED SURG/OB UMMC

## 2025-03-08 PROCEDURE — 250N000011 HC RX IP 250 OP 636

## 2025-03-08 PROCEDURE — 84550 ASSAY OF BLOOD/URIC ACID: CPT | Performed by: PHYSICIAN ASSISTANT

## 2025-03-08 RX ORDER — CALCIUM CARBONATE 500 MG/1
500 TABLET, CHEWABLE ORAL 3 TIMES DAILY PRN
Status: DISCONTINUED | OUTPATIENT
Start: 2025-03-08 | End: 2025-04-06 | Stop reason: HOSPADM

## 2025-03-08 RX ADMIN — FAMOTIDINE 10 MG: 10 TABLET ORAL at 20:02

## 2025-03-08 RX ADMIN — ENOXAPARIN SODIUM 40 MG: 40 INJECTION SUBCUTANEOUS at 20:02

## 2025-03-08 RX ADMIN — NICOTINE 1 PATCH: 14 PATCH, EXTENDED RELEASE TRANSDERMAL at 09:18

## 2025-03-08 RX ADMIN — ACYCLOVIR 400 MG: 400 TABLET ORAL at 09:17

## 2025-03-08 RX ADMIN — ALLOPURINOL 300 MG: 300 TABLET ORAL at 09:17

## 2025-03-08 RX ADMIN — LORATADINE 10 MG: 10 TABLET ORAL at 09:17

## 2025-03-08 RX ADMIN — CYTARABINE 190 MG: 100 INJECTION, SOLUTION INTRATHECAL; INTRAVENOUS; SUBCUTANEOUS at 01:08

## 2025-03-08 RX ADMIN — PAROXETINE HYDROCHLORIDE 20 MG: 20 TABLET, FILM COATED ORAL at 09:17

## 2025-03-08 RX ADMIN — FAMOTIDINE 10 MG: 10 TABLET ORAL at 09:17

## 2025-03-08 RX ADMIN — ONDANSETRON HYDROCHLORIDE 8 MG: 8 TABLET, FILM COATED ORAL at 09:17

## 2025-03-08 RX ADMIN — ACYCLOVIR 400 MG: 400 TABLET ORAL at 20:02

## 2025-03-08 RX ADMIN — FLUTICASONE FUROATE AND VILANTEROL TRIFENATATE 1 PUFF: 100; 25 POWDER RESPIRATORY (INHALATION) at 09:17

## 2025-03-08 RX ADMIN — CALCIUM CARBONATE (ANTACID) CHEW TAB 500 MG 500 MG: 500 CHEW TAB at 16:29

## 2025-03-08 RX ADMIN — LISINOPRIL 10 MG: 10 TABLET ORAL at 09:17

## 2025-03-08 RX ADMIN — ACETAMINOPHEN 650 MG: 325 TABLET, FILM COATED ORAL at 15:52

## 2025-03-08 RX ADMIN — LEVOFLOXACIN 750 MG: 750 TABLET, FILM COATED ORAL at 09:17

## 2025-03-08 RX ADMIN — MICAFUNGIN SODIUM 50 MG: 50 INJECTION, POWDER, LYOPHILIZED, FOR SOLUTION INTRAVENOUS at 20:02

## 2025-03-08 RX ADMIN — Medication 1000 UNITS: at 09:17

## 2025-03-08 NOTE — PROGRESS NOTES
[facility]    Hematology / Oncology Progress Note  Hospital Day # 10  Date of Service (when I saw the patient): 03/08/2025     Assessment & Plan   Alejandra Villegas is a 59 year old female with a past medical history significant for COPD, CKD, migraines, rheumatoid arthritis, aortic stenosis, Afib, CHF, and anxiety who presented to an outside hospital with cytopenias and was found on BMBx to have hypercellular marrow with 4% blasts, 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 She was subsequently admitted to [facility] on 2/26/25 for further work-up and management and pathology re-review was most consistent with AML with NPM1 mutation by WHO 2022 (which does not require a specific blast threshold). Following further multidisciplinary discussion, she was started on intensive induction with 7+3 (D1=3/5/25).  Her course has been complicated by neutropenic fevers, influenza A, and AOM.     Today:  - D4 from 7+3 induction.  - Continue course of PO levofloxacin 750 mg daily through 3/10/25.  - Continue to encourage smoking cessation, patient is motivated. NRT available, patient politely declined at this time.     HEME  # AML with NPM1 mutation  Had been seen by PCP Dec 2024 w/ progressive fatigue and nausea where she was found leukopenic WBC  2.4 and neutropenic w/ ANC 0.3. Hgb 12. Was referred to local hematology/oncology clinic for further evaluation and seen by Dr. Harris. BMBx 2/10/25 done at OSH showed hypercellular marrow w/ trilineage hematopoiesis w/ dyspoiesis with mildly elevated blasts of 4% on morphology. NGS w/ NPM1, IDH2, and NRAS mutations. She was admitted to [facility] 2/26/25 for further workup and management. Slides were re-reviewed by [facility] Hematopathology, who noted hypercellular marrow with 8% blasts by morphology. Despite relatively low blast percentage, findings were felt most consistent with a diagnosis AML with NPM1 mutation by WHO 2022 (which  does not require a specific blast threshold). Following interdepartmental discussions and review of the available literature, patient was started on intensive induction with 7+3 (Day 1=3/5/25).   - Baseline cardiopulmonary studies:  - Echo w/ EF 60-65%, mild known aortic stenosis.   - EKG (2/27) with NSR, QTc 412  - CXR (2/26) with mildly increased streaky bibasilar opacities, atelectasis or edema. No consolidation.   - Baseline viral serologies: CMV IgG+, EBV IgG+, HepB sAg-, HepB cAb-, HepB sAb-, HSV1+/2+, HIV-  - HLA Typing sent on admission. Message sent to notify BMT team x2/27 for IP consult.   - PICC placed 3/5/2025.        Treatment plan: 7+3 (C1D1=3/5/2025)    - Daunorubicin 60 mg/m  IV D1-3    - Cytarabine 100 mg/m  IV D1-7      - Pre-medications: zofran, dexamethasone 12 mg D1-3     # Anemia  # Leukopenia  # Neutropenia  Secondary to underlying hematologic malignancy and exacerbated by recent chemotherapy.  - Follow daily CBC with differential  - Transfuse to keep Hgb >7, plt >10K  - Blood consent obtained 2/26/25 on admission and placed in patient's paper chart.      # Risk for TLS  # Hyperphosphatemia, resolved  Secondary to hematologic malignancy, no evidence of TLS on admission.   - Allopurinol 300 mg daily x7 days (last dose 3/11), stop date placed  - Follow TLS labs daily    # Risk for DIC  Secondary to underlying hematologic malignancy.  - Follow coags every MWF  - Transfuse cryo to keep fibrinogen >100, FFP to keep INR <1.8     ID  # ID prophylaxis  - Acyclovir 400 mg BID  - Levaquin 500 mg daily, increased to 750 mg while covering AOM as below  - Micafungin 50 mg daily; anticipate rotating to azole on ~D8 (3/12/25)  - PLC placed on admission for posa/vori coverage; vori w/ $1.60 copay, posa requiring PA.      # Neutropenic fever, fever resolved  # Influenza A  On admission, patient noted subjective fever with chills and rhinitis beginning 2/25 PM prior to admission. No other localizing s/sx of  infection. She was afebrile on admission but spiked a low-grade fever to 100.7 on 2/26 PM. Blood and urine cultures negative, CXR with streaky opacities but no burt consolidation. Work-up positive for influenza A, and she completed a course of Tamiflu 75 mg BID x5 days (2/26-3/3). She also received a short empiric course of IV cefepime (2/26-3/1) given concurrent neutropenia.  - Continue droplet precautions   - Monitor for recurrent fevers    # R AOM with purulent effusion  # Small L TM perforation  Patient noted B/L ear pain, L>R s/p drainage from R side on 3/3. Noted h/o recurrent AOMs. On exam, TMs appear full with erythema to outer edges, patient noted pain w/ exam though able to tolerate. Small TM perforation noted to L side. Query if perforation from recurrent AOM or 2/2 congestion and fluid from recent influenza.  - ENT consulted, appreciate recs:  - Recommended 7-day course Augmentin (per cross-cover discussion with pharmacy, given h/o allergic reaction to Augmentin, increased dose of levofloxacin from 500 mg ? 750 mg daily x7 days (3/5-3/11).   - Recommended 5 day course of floxin drops to left ear for perforation (3/3-3/7)  - Follow up w/ PCP for left TM perforation - if persistent in 6-8 weeks recommend outpt ENT referral.     GI/  # Nausea/vomiting, recurrent/intermittent/resolved  Patient noted ongoing nausea w/ occasional vomiting starting Dec 2024. Has been managed with PRN Zofran.  Endorsed nausea on admission, now resolved.   - PRN Zofran and compazine  - Could consider trial of PRN Zyprexa if 3rd agent is desired/required     # Urinary frequency, resolved  # Dysuria, resolved  On admission, patient noted longstanding history of urinary frequency though new mild dysuria. Denies hematuria, bladder tenderness of CVA tenderness. UA (2/26) negative, UC with no growth. Symptoms have since resolved.     # GERD  Reported recent increase in symptoms in the days leading up to admission.  - Famotidine 10  mg BID     PULM  # COPD  Longstanding history of COPD. On admission patient reports symptoms are manageable and no recent exacerbations. Most recent PFTs (2018) were normal.  - Continue PTA Breo Ellipta inhaler and PRN DuoNebs      CV  # Essential hypertension  - Continue PTA lisinopril     # Mixed hyperlipidemia  Lipid panel has remained at goal, 1/14/25 panel w/ cholesterol 163, , LDL 92, HDL 45.   - Held PTA atorvastatin on admission due to chemotherapy interactions; will also likely have DDI with azole once started. Consider rotation to rosuvastatin for better drug-drug interaction profile.     # H/o aortic stenosis  Patient noted on admission longstanding history of aortic stenosis. Pre-chemo echo w/ EF 60-65% and mild aortic stenosis and insufficiency. No previous echo to compare.      RENAL  # Stage 2 CKD  Baseline Cr ~ 0.7. On admission Cr 0.77.  - Continue to trend on daily labs and encourage PO hydration     NEURO  # Degenerative disc disease, L5-S1  - Continue PTA gabapentin     # Chronic migraine w/o aura  Present since childhood. Reportedly triggered by neck manipulation/movement. Reportedly well-managed with regimen below.  - Continue PTA sumatriptan and cyclobenzaprine PRN     ENDO  # Prediabetes  Last A1c 6.0 x12/9/24. Has been managing with lifestyle modifications.      MISC  # Rheumatoid arthritis   Patient reported trying treatment though was unable to tolerate well. Managed with Tylenol PRN. Most recnet RF >650 (2/10/25). JI negative.       # BROOKS  # MDD  # At risk for adjustment disorder  Patient reports good control of anxiety/depressive symptoms prior to admission. Did note feeling overwhelmed by new diagnosis. On admission discussed inpatient services such as health psychology or cordelia, she respectfully declined though will consider.   - Readdress health psych consult as indicated  - Continue PTA paroxetine      # Vitamin D deficiency  Continue PTA vit D supplement    # Seasonal  allergies - Claritin 10 mg daily     # Tobacco use   Has smoked since age 16, 1.5 ppd (roughly 65 pack years). Attempting to cut back as recently as 01/2025.   - Encourage smoking cessation  - Declined NRT on admission; continue to offer as indicated    Fluids/Electrolytes/Nutrition   - IVF prn   - PRN lyte replacement per standing protocol  - Regular diet as tolerated     Lines: PICC    PPX  VTE: Lovenox, hold for plts < 50k  GI: Famotidine 10 mg BID  Bowels: prn senna and miralax  Activity: as tolerated    Code: DNR/DNI    Medically Ready for Discharge: Anticipated in 5+ Days    Disposition: Admitted for further workup and management. Discharge pending treatment decisions and clinical course.   Follow-up: Will need to request APPT18 closer to discharge date and determine primary oncologist.     55 MINUTES SPENT BY ME on the date of service doing chart review, history, exam, documentation & further activities per the note.      Staffed with Dr. Hwang.    Zully Garcia PA-C  Hematology/Oncology  Pager: #[phone]     Interval History   No acute overnight events. Nursing notes reviewed. Alejandra is feeling well today, denies any new symptoms.  Has been tolerating chemotherapy thus far without any side effects. Denies headache, vision changes, N/V, chest pain, palpitations, shortness of breath, abdominal pain or cramping, dysuria. Continues to move bowels.  She asks when she would be able to go outside again, discussed once she is not hooked up to continuous chemotherapy.  She states she is trying to cut back on smoking, but is enjoying looking forward to going out for cigarette.  Discussed NRT today, patient platelet declined.  She does tell me today that around the date of 3/10, is typically very difficult for her as her son passed away here in the ICU 8 years ago.  We discussed this, emotional support offered.  Discussed health psychology and/or spiritual health/ visit if this would be helpful for her,  "she politely declines but states that she may just ask to lend an ear if she needs it.  Encouraged her to let us know how we can best support her.  Reviewed interval labs and plan of care for today.  All questions addressed.    A comprehensive ROS was performed and was negative except as detailed in the HPI above.     Physical Exam   Temp: 97.3  F (36.3  C) Temp src: Oral BP: 119/77 Pulse: 73   Resp: 18 SpO2: 97 % O2 Device: None (Room air)    Vitals:    03/05/25 0821 03/06/25 1212 03/07/25 1122   Weight: 78.8 kg (173 lb 11.2 oz) 80.8 kg (178 lb 1.6 oz) 80.6 kg (177 lb 9.6 oz)     Vital Signs with Ranges  Temp:  [97.3  F (36.3  C)-98.1  F (36.7  C)] 97.3  F (36.3  C)  Pulse:  [73-91] 73  Resp:  [16-18] 18  BP: ()/(60-77) 119/77  SpO2:  [95 %-98 %] 97 %  I/O last 3 completed shifts:  In: 1518.4 [P.O.:740; IV Piggyback:778.4]  Out: 2750 [Urine:2750]      Physical Exam:    Blood pressure 119/77, pulse 73, temperature 97.3  F (36.3  C), temperature source Oral, resp. rate 18, height 1.626 m (5' 4\"), weight 80.6 kg (177 lb 9.6 oz), last menstrual period 01/01/2007, SpO2 97%, not currently breastfeeding.    Constitutional: Pleasant and cooperative female seen seated in bed, in NAD. Awake, alert, interactive, non-toxic. Smiling and conversational.  HEENT: NC/AT, sclera clear, conjunctiva normal, OP with MMM, no discrete intraoral lesions or thrush, poor dentition  Respiratory: No increased work of breathing, CTAB, no crackles or wheezing.  Cardiovascular: RRR, harsh blowing systolic murmur (I-II/VI) heard at the LUSB. No peripheral edema. No calf tenderness or palpable cords.  GI: Normal bowel sounds. Abdomen is soft, non-distended and non-tender to palpation.  Skin: Warm, dry, well-perfused. No bruising, bleeding, rashes, or lesions on limited exam.  Musculoskeletal: Diminished muscle bulk, no gross deformities.  Neurologic: A&O. Answers questions appropriately, speech normal. Moves all extremities " spontaneously.  Psychiatric: Normal mood and affect.  Vascular access:  PICC on RUE CDI, non-tender, no surrounding erythema.    Medications   Current Facility-Administered Medications   Medication Dose Route Frequency Provider Last Rate Last Admin     Current Facility-Administered Medications   Medication Dose Route Frequency Provider Last Rate Last Admin    acyclovir (ZOVIRAX) tablet 400 mg  400 mg Oral BID Chelsie Murphy PA-C   400 mg at 03/08/25 0917    allopurinol (ZYLOPRIM) tablet 300 mg  300 mg Oral Daily Chelsie Davila PA-C   300 mg at 03/08/25 0917    Chemotherapy Infusing-Continuous Infusion   Does not apply Q8H Roney Hwang MD 43.8 mL/hr at 03/07/25 2114 Given at 03/08/25 0523    cytarabine (PF) (CYTOSAR) 190 mg in D5W 1,051.9 mL infusion  100 mg/m2 (Treatment Plan Recorded) Intravenous Q24H Roney Hwang MD 43.8 mL/hr at 03/08/25 0108 190 mg at 03/08/25 0108    enoxaparin ANTICOAGULANT (LOVENOX) injection 40 mg  40 mg Subcutaneous Q24H Chelsie Murphy PA-C   40 mg at 03/07/25 1917    famotidine (PEPCID) tablet 10 mg  10 mg Oral BID Chelsie Murphy PA-C   10 mg at 03/08/25 0917    fluticasone-vilanterol (BREO ELLIPTA) 100-25 MCG/ACT inhaler 1 puff  1 puff Inhalation Daily Chelsie Murphy PA-C   1 puff at 03/08/25 0917    heparin lock flush 10 unit/mL injection 5-20 mL  5-20 mL Intracatheter Q24H Chelsie Murphy PA-C   5 mL at 03/06/25 1805    heparin lock flush 10 unit/mL injection 5-20 mL  5-20 mL Intracatheter Q24H Chelsie Murphy PA-C   5 mL at 03/07/25 2137    [START ON 3/11/2025] levofloxacin (LEVAQUIN) tablet 500 mg  500 mg Oral Daily Hagstrom, Zully L, PA-C        levofloxacin (LEVAQUIN) tablet 750 mg  750 mg Oral Daily Zully Garcia PA-C   750 mg at 03/08/25 0917    lisinopril (ZESTRIL) tablet 10 mg  10 mg Oral Daily Jyoti Tenorio PA-C   10 mg at 03/08/25 0917    loratadine (CLARITIN)  tablet 10 mg  10 mg Oral Daily Chelsie Murphy PA-C   10 mg at 03/08/25 0917    micafungin (MYCAMINE) 50 mg in sodium chloride 0.9 % 100 mL intermittent infusion  50 mg Intravenous Q24H Chelsie Murphy PA-C 100 mL/hr at 03/07/25 2001 50 mg at 03/07/25 2001    nicotine (NICODERM CQ) 14 MG/24HR 24 hr patch 1 patch  1 patch Transdermal Daily Chelsie Murphy PA-C   1 patch at 03/08/25 0918    ondansetron (ZOFRAN) tablet 8 mg  8 mg Oral Q12H Roney Hwang MD   8 mg at 03/08/25 0917    PARoxetine (PAXIL) tablet 20 mg  20 mg Oral QAM Chelsie Murphy PA-C   20 mg at 03/08/25 0917    sodium chloride (PF) 0.9% PF flush 10-40 mL  10-40 mL Intracatheter Q8H Chelsie Murphy PA-C   10 mL at 03/08/25 0918    Vitamin D3 (CHOLECALCIFEROL) tablet 1,000 Units  1,000 Units Oral Daily Chelsie Murphy PA-C   1,000 Units at 03/08/25 0917       Data   Results for orders placed or performed during the hospital encounter of 02/26/25 (from the past 24 hours)   CBC with platelets differential    Narrative    The following orders were created for panel order CBC with platelets differential.  Procedure                               Abnormality         Status                     ---------                               -----------         ------                     CBC with platelets and ...[[id number]]  Abnormal            Final result               RBC and Platelet Morpho...[[id number]]                                                   Please view results for these tests on the individual orders.   Comprehensive metabolic panel   Result Value Ref Range    Sodium 135 135 - 145 mmol/L    Potassium 4.7 3.4 - 5.3 mmol/L    Carbon Dioxide (CO2) 20 (L) 22 - 29 mmol/L    Anion Gap 14 7 - 15 mmol/L    Urea Nitrogen 22.9 (H) 6.0 - 20.0 mg/dL    Creatinine 0.68 0.51 - 0.95 mg/dL    GFR Estimate >90 >60 mL/min/1.73m2    Calcium 9.0 8.8 - 10.4 mg/dL    Chloride 101 98 - 107 mmol/L     Glucose 154 (H) 70 - 99 mg/dL    Alkaline Phosphatase 160 (H) 40 - 150 U/L    AST 30 0 - 45 U/L    ALT 18 0 - 50 U/L    Protein Total 7.1 6.4 - 8.3 g/dL    Albumin 4.0 3.5 - 5.2 g/dL    Bilirubin Total 0.4 <=1.2 mg/dL   Magnesium   Result Value Ref Range    Magnesium 2.6 (H) 1.7 - 2.3 mg/dL   Lactate Dehydrogenase (aka LDH)   Result Value Ref Range    Lactate Dehydrogenase 430 (H) 0 - 250 U/L   Phosphorus   Result Value Ref Range    Phosphorus 4.5 2.5 - 4.5 mg/dL   Uric acid   Result Value Ref Range    Uric Acid 4.3 2.4 - 5.7 mg/dL   CBC with platelets and differential   Result Value Ref Range    WBC Count 1.4 (L) 4.0 - 11.0 10e3/uL    RBC Count 1.98 (L) 3.80 - 5.20 10e6/uL    Hemoglobin 7.7 (L) 11.7 - 15.7 g/dL    Hematocrit 21.8 (L) 35.0 - 47.0 %     (H) 78 - 100 fL    MCH 38.9 (H) 26.5 - 33.0 pg    MCHC 35.3 31.5 - 36.5 g/dL    RDW 15.0 10.0 - 15.0 %    Platelet Count 148 (L) 150 - 450 10e3/uL    % Neutrophils 58 %    % Lymphocytes 33 %    % Monocytes 8 %    % Eosinophils 0 %    % Basophils 0 %    % Immature Granulocytes 1 %    NRBCs per 100 WBC 2 (H) <1 /100    Absolute Neutrophils 0.8 (L) 1.6 - 8.3 10e3/uL    Absolute Lymphocytes 0.5 (L) 0.8 - 5.3 10e3/uL    Absolute Monocytes 0.1 0.0 - 1.3 10e3/uL    Absolute Eosinophils 0.0 0.0 - 0.7 10e3/uL    Absolute Basophils 0.0 0.0 - 0.2 10e3/uL    Absolute Immature Granulocytes 0.0 <=0.4 10e3/uL    Absolute NRBCs 0.0 10e3/uL

## 2025-03-08 NOTE — PROGRESS NOTES
Nursing Focus: Chemotherapy  D: Positive blood return via PICC . Insertion site is clean/dry/intact, dressing intact with no complaints of pain.  Pre infusion assessment documented in Flowsheet (if applicable).      I: Premedications given per order (see electronic medical administration record). Dose #3 of Cytarabine started to infuse over 24 hours. Reviewed pt teaching on chemotherapy side effects.  Pt denies need for further teaching. Chemotherapy double checked per protocol by two chemotherapy competent RN's.     A: Tolerating infusion well. Denies nausea and or pain.     P: Continue to monitor urine output and symptoms of nausea. Screen for symptoms of toxicity.    Marivel Willett RN on 3/8/2025 at 3:18 AM

## 2025-03-08 NOTE — PROGRESS NOTES
[id number]:     VSS, afebrile. Tylenol given x2 for lower body aches, not new. Chemo day 3 infusing. Pt denies nausea and constipation. LBM yesterday. Good appetite. Up walking halls multiple times.       Nursing Focus: Chemotherapy  D: Positive brisk bright red blood return via PICC. Insertion site is clean/dry/intact, dressing intact with no complaints of pain.  Urine output is recorded in intake Doc Flowsheet.    I: Premedications given per order (see electronic medical administration record). Daunorubicin started to infuse over 15 minutes. Reviewed pt teaching on chemotherapy side effects.  Pt denies need for further teaching. Chemotherapy double checked per protocol by two chemotherapy competent RN's.   A: Tolerating chemo well. Denies nausea.   P: Continue to monitor urine output and symptoms of nausea. Screen for symptoms of toxicity.

## 2025-03-08 NOTE — PLAN OF CARE
Goal Outcome Evaluation:      Plan of Care Reviewed With: patient    Overall Patient Progress: no change       Reason for admission: 2/26/25 for further workup and management of AML       AVSS.  97% RA.  Denies pain, denies nausea.    Bag #3 Cytarabine hung at 0110.  + blood return via PICC.  UAL.  Voiding spont.  Nicotine patch on R arm.  Cont with POC.

## 2025-03-09 ENCOUNTER — HEALTH MAINTENANCE LETTER (OUTPATIENT)
Age: 60
End: 2025-03-09

## 2025-03-09 LAB
ALBUMIN SERPL BCG-MCNC: 3.7 G/DL (ref 3.5–5.2)
ALP SERPL-CCNC: 143 U/L (ref 40–150)
ALT SERPL W P-5'-P-CCNC: 13 U/L (ref 0–50)
ANION GAP SERPL CALCULATED.3IONS-SCNC: 10 MMOL/L (ref 7–15)
AST SERPL W P-5'-P-CCNC: 27 U/L (ref 0–45)
ATRIAL RATE - MUSE: 69 BPM
BASOPHILS # BLD AUTO: 0 10E3/UL (ref 0–0.2)
BASOPHILS NFR BLD AUTO: 0 %
BILIRUB SERPL-MCNC: 0.4 MG/DL
BUN SERPL-MCNC: 22.8 MG/DL (ref 6–20)
CALCIUM SERPL-MCNC: 8.5 MG/DL (ref 8.8–10.4)
CHLORIDE SERPL-SCNC: 102 MMOL/L (ref 98–107)
CREAT SERPL-MCNC: 0.7 MG/DL (ref 0.51–0.95)
DIASTOLIC BLOOD PRESSURE - MUSE: NORMAL MMHG
EGFRCR SERPLBLD CKD-EPI 2021: >90 ML/MIN/1.73M2
EOSINOPHIL # BLD AUTO: 0 10E3/UL (ref 0–0.7)
EOSINOPHIL NFR BLD AUTO: 0 %
ERYTHROCYTE [DISTWIDTH] IN BLOOD BY AUTOMATED COUNT: 14.7 % (ref 10–15)
GLUCOSE SERPL-MCNC: 103 MG/DL (ref 70–99)
HCO3 SERPL-SCNC: 22 MMOL/L (ref 22–29)
HCT VFR BLD AUTO: 21.5 % (ref 35–47)
HGB BLD-MCNC: 7.4 G/DL (ref 11.7–15.7)
IMM GRANULOCYTES # BLD: 0 10E3/UL
IMM GRANULOCYTES NFR BLD: 0 %
INTERPRETATION ECG - MUSE: NORMAL
LDH SERPL L TO P-CCNC: 379 U/L (ref 0–250)
LYMPHOCYTES # BLD AUTO: 0.8 10E3/UL (ref 0.8–5.3)
LYMPHOCYTES NFR BLD AUTO: 71 %
MAGNESIUM SERPL-MCNC: 2.5 MG/DL (ref 1.7–2.3)
MCH RBC QN AUTO: 38.7 PG (ref 26.5–33)
MCHC RBC AUTO-ENTMCNC: 34.4 G/DL (ref 31.5–36.5)
MCV RBC AUTO: 113 FL (ref 78–100)
MONOCYTES # BLD AUTO: 0.1 10E3/UL (ref 0–1.3)
MONOCYTES NFR BLD AUTO: 8 %
NEUTROPHILS # BLD AUTO: 0.2 10E3/UL (ref 1.6–8.3)
NEUTROPHILS NFR BLD AUTO: 21 %
NRBC # BLD AUTO: 0 10E3/UL
NRBC BLD AUTO-RTO: 0 /100
P AXIS - MUSE: 63 DEGREES
PHOSPHATE SERPL-MCNC: 4.8 MG/DL (ref 2.5–4.5)
PLAT MORPH BLD: NORMAL
PLATELET # BLD AUTO: 138 10E3/UL (ref 150–450)
POTASSIUM SERPL-SCNC: 4 MMOL/L (ref 3.4–5.3)
PR INTERVAL - MUSE: 124 MS
PROT SERPL-MCNC: 6.5 G/DL (ref 6.4–8.3)
QRS DURATION - MUSE: 66 MS
QT - MUSE: 378 MS
QTC - MUSE: 405 MS
R AXIS - MUSE: 12 DEGREES
RBC # BLD AUTO: 1.91 10E6/UL (ref 3.8–5.2)
RBC MORPH BLD: NORMAL
SODIUM SERPL-SCNC: 134 MMOL/L (ref 135–145)
SYSTOLIC BLOOD PRESSURE - MUSE: NORMAL MMHG
T AXIS - MUSE: 31 DEGREES
URATE SERPL-MCNC: 4.8 MG/DL (ref 2.4–5.7)
VENTRICULAR RATE- MUSE: 69 BPM
WBC # BLD AUTO: 1.2 10E3/UL (ref 4–11)

## 2025-03-09 PROCEDURE — 120N000002 HC R&B MED SURG/OB UMMC

## 2025-03-09 PROCEDURE — 250N000013 HC RX MED GY IP 250 OP 250 PS 637

## 2025-03-09 PROCEDURE — 85041 AUTOMATED RBC COUNT: CPT

## 2025-03-09 PROCEDURE — 250N000011 HC RX IP 250 OP 636

## 2025-03-09 PROCEDURE — 93005 ELECTROCARDIOGRAM TRACING: CPT

## 2025-03-09 PROCEDURE — 93010 ELECTROCARDIOGRAM REPORT: CPT | Performed by: INTERNAL MEDICINE

## 2025-03-09 PROCEDURE — 80053 COMPREHEN METABOLIC PANEL: CPT

## 2025-03-09 PROCEDURE — 83735 ASSAY OF MAGNESIUM: CPT

## 2025-03-09 PROCEDURE — 84100 ASSAY OF PHOSPHORUS: CPT | Performed by: PHYSICIAN ASSISTANT

## 2025-03-09 PROCEDURE — 85004 AUTOMATED DIFF WBC COUNT: CPT

## 2025-03-09 PROCEDURE — 84550 ASSAY OF BLOOD/URIC ACID: CPT | Performed by: PHYSICIAN ASSISTANT

## 2025-03-09 PROCEDURE — 250N000013 HC RX MED GY IP 250 OP 250 PS 637: Performed by: STUDENT IN AN ORGANIZED HEALTH CARE EDUCATION/TRAINING PROGRAM

## 2025-03-09 PROCEDURE — 250N000011 HC RX IP 250 OP 636: Performed by: INTERNAL MEDICINE

## 2025-03-09 PROCEDURE — 250N000013 HC RX MED GY IP 250 OP 250 PS 637: Performed by: PHYSICIAN ASSISTANT

## 2025-03-09 PROCEDURE — 99233 SBSQ HOSP IP/OBS HIGH 50: CPT | Mod: FS

## 2025-03-09 PROCEDURE — 258N000003 HC RX IP 258 OP 636: Performed by: INTERNAL MEDICINE

## 2025-03-09 PROCEDURE — 258N000003 HC RX IP 258 OP 636

## 2025-03-09 PROCEDURE — 83615 LACTATE (LD) (LDH) ENZYME: CPT

## 2025-03-09 RX ORDER — FAMOTIDINE 20 MG/1
20 TABLET, FILM COATED ORAL 2 TIMES DAILY
Status: DISCONTINUED | OUTPATIENT
Start: 2025-03-09 | End: 2025-04-06 | Stop reason: HOSPADM

## 2025-03-09 RX ORDER — HYDROXYZINE HYDROCHLORIDE 25 MG/1
25 TABLET, FILM COATED ORAL EVERY 6 HOURS PRN
Status: DISCONTINUED | OUTPATIENT
Start: 2025-03-09 | End: 2025-04-06 | Stop reason: HOSPADM

## 2025-03-09 RX ORDER — HYDROXYZINE HYDROCHLORIDE 25 MG/1
50 TABLET, FILM COATED ORAL EVERY 6 HOURS PRN
Status: DISCONTINUED | OUTPATIENT
Start: 2025-03-09 | End: 2025-04-06 | Stop reason: HOSPADM

## 2025-03-09 RX ORDER — PANTOPRAZOLE SODIUM 40 MG/1
40 TABLET, DELAYED RELEASE ORAL
Status: DISCONTINUED | OUTPATIENT
Start: 2025-03-09 | End: 2025-03-09

## 2025-03-09 RX ADMIN — CYTARABINE 190 MG: 100 INJECTION, SOLUTION INTRATHECAL; INTRAVENOUS; SUBCUTANEOUS at 01:04

## 2025-03-09 RX ADMIN — ALLOPURINOL 300 MG: 300 TABLET ORAL at 10:51

## 2025-03-09 RX ADMIN — LEVOFLOXACIN 750 MG: 750 TABLET, FILM COATED ORAL at 10:51

## 2025-03-09 RX ADMIN — ONDANSETRON HYDROCHLORIDE 8 MG: 8 TABLET, FILM COATED ORAL at 10:51

## 2025-03-09 RX ADMIN — PAROXETINE HYDROCHLORIDE 20 MG: 20 TABLET, FILM COATED ORAL at 10:51

## 2025-03-09 RX ADMIN — HEPARIN, PORCINE (PF) 10 UNIT/ML INTRAVENOUS SYRINGE 5 ML: at 05:13

## 2025-03-09 RX ADMIN — ENOXAPARIN SODIUM 40 MG: 40 INJECTION SUBCUTANEOUS at 20:21

## 2025-03-09 RX ADMIN — ONDANSETRON 4 MG: 4 TABLET, ORALLY DISINTEGRATING ORAL at 13:10

## 2025-03-09 RX ADMIN — FLUTICASONE FUROATE AND VILANTEROL TRIFENATATE 1 PUFF: 100; 25 POWDER RESPIRATORY (INHALATION) at 10:55

## 2025-03-09 RX ADMIN — LORATADINE 10 MG: 10 TABLET ORAL at 10:51

## 2025-03-09 RX ADMIN — Medication 1000 UNITS: at 10:51

## 2025-03-09 RX ADMIN — FAMOTIDINE 20 MG: 20 TABLET, FILM COATED ORAL at 20:21

## 2025-03-09 RX ADMIN — PROCHLORPERAZINE MALEATE 5 MG: 5 TABLET ORAL at 17:16

## 2025-03-09 RX ADMIN — ONDANSETRON HYDROCHLORIDE 8 MG: 8 TABLET, FILM COATED ORAL at 00:20

## 2025-03-09 RX ADMIN — ACYCLOVIR 400 MG: 400 TABLET ORAL at 20:21

## 2025-03-09 RX ADMIN — MICAFUNGIN SODIUM 50 MG: 50 INJECTION, POWDER, LYOPHILIZED, FOR SOLUTION INTRAVENOUS at 21:38

## 2025-03-09 RX ADMIN — ACETAMINOPHEN 650 MG: 325 TABLET, FILM COATED ORAL at 10:51

## 2025-03-09 RX ADMIN — CALCIUM CARBONATE (ANTACID) CHEW TAB 500 MG 500 MG: 500 CHEW TAB at 11:51

## 2025-03-09 RX ADMIN — NICOTINE 1 PATCH: 14 PATCH, EXTENDED RELEASE TRANSDERMAL at 10:56

## 2025-03-09 RX ADMIN — ONDANSETRON HYDROCHLORIDE 8 MG: 8 TABLET, FILM COATED ORAL at 20:21

## 2025-03-09 RX ADMIN — HYDROXYZINE HYDROCHLORIDE 25 MG: 25 TABLET, FILM COATED ORAL at 20:21

## 2025-03-09 RX ADMIN — FAMOTIDINE 10 MG: 10 TABLET ORAL at 10:51

## 2025-03-09 RX ADMIN — ACYCLOVIR 400 MG: 400 TABLET ORAL at 10:51

## 2025-03-09 ASSESSMENT — ACTIVITIES OF DAILY LIVING (ADL)
ADLS_ACUITY_SCORE: 35

## 2025-03-09 NOTE — PROGRESS NOTES
[facility]    Hematology / Oncology Progress Note  Hospital Day # 11  Date of Service (when I saw the patient): 03/09/2025     Assessment & Plan   Alejandra Villegas is a 59 year old female with a past medical history significant for COPD, CKD, migraines, rheumatoid arthritis, aortic stenosis, Afib, CHF, and anxiety who presented to an outside hospital with cytopenias and was found on BMBx to have hypercellular marrow with 4% blasts, 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 She was subsequently admitted to [facility] on 2/26/25 for further work-up and management and pathology re-review was most consistent with AML with NPM1 mutation by WHO 2022 (which does not require a specific blast threshold). Following further multidisciplinary discussion, she was started on intensive induction with 7+3 (D1=3/5/25).  Her course has been complicated by neutropenic fevers, influenza A, and AOM.     Today:  - D5 from 7+3 induction.  - Non radiating mid-sternal chest pain this afternoon with associated reflux-like symptoms with anxiety.   - stat EKG - normal  - Increase famotidine 10 ? 20 mg BID. If symptoms persist, add protonix. Continue TUMS prn.   - Atarax prn for anxiety  - Tentative plan for SLP eval tomorrow pending recurrence of symptoms  - Continue course of PO levofloxacin 750 mg daily through 3/10/25 for AOM, then resume ppx (ordered).  - Continue to encourage smoking cessation, patient is motivated. Utilizing nicotine patch. Smoking cessation team offered, patient politely declined at this time.   - Patient endorsing more anxiety and depression today, discussed additional support with health psychology and spiritual health which she politely declined at this time. Continue to assess needs.     HEME  # AML with NPM1 mutation  Had been seen by PCP Dec 2024 w/ progressive fatigue and nausea where she was found leukopenic WBC  2.4 and neutropenic w/ ANC 0.3.  Hgb 12. Was referred to local hematology/oncology clinic for further evaluation and seen by Dr. Harrsi. BMBx 2/10/25 done at OSH showed hypercellular marrow w/ trilineage hematopoiesis w/ dyspoiesis with mildly elevated blasts of 4% on morphology. NGS w/ NPM1, IDH2, and NRAS mutations. She was admitted to [facility] 2/26/25 for further workup and management. Slides were re-reviewed by [facility] Hematopathology, who noted hypercellular marrow with 8% blasts by morphology. Despite relatively low blast percentage, findings were felt most consistent with a diagnosis AML with NPM1 mutation by WHO 2022 (which does not require a specific blast threshold). Following interdepartmental discussions and review of the available literature, patient was started on intensive induction with 7+3 (Day 1=3/5/25).   - Baseline cardiopulmonary studies:  - Echo w/ EF 60-65%, mild known aortic stenosis.   - EKG (2/27) with NSR, QTc 412  - CXR (2/26) with mildly increased streaky bibasilar opacities, atelectasis or edema. No consolidation.   - Baseline viral serologies: CMV IgG+, EBV IgG+, HepB sAg-, HepB cAb-, HepB sAb-, HSV1+/2+, HIV-  - HLA Typing sent on admission. Message sent to notify BMT team x2/27 for IP consult.   - PICC placed 3/5/2025.        Treatment plan: 7+3 (C1D1=3/5/2025)    - Daunorubicin 60 mg/m  IV D1-3    - Cytarabine 100 mg/m  IV D1-7      - Pre-medications: zofran, dexamethasone 12 mg D1-3     # Pancytopenia  Secondary to underlying hematologic malignancy and exacerbated by recent chemotherapy.  - Follow daily CBC with differential  - Transfuse to keep Hgb >7, plt >10K  - Blood consent obtained 2/26/25 on admission and placed in patient's paper chart.      # Risk for TLS  # Hyperphosphatemia, resolved  Secondary to hematologic malignancy, no evidence of TLS on admission.   - Allopurinol 300 mg daily x7 days (last dose 3/11), stop date placed  - Follow TLS labs daily    # Risk for DIC  Secondary to underlying hematologic  malignancy.  - Follow coags every MWF  - Transfuse cryo to keep fibrinogen >100, FFP to keep INR <1.8     ID  # ID prophylaxis  - Acyclovir 400 mg BID  - Levaquin 500 mg daily, increased to 750 mg while covering AOM as below  - Micafungin 50 mg daily; anticipate rotating to azole on ~D8 (3/12/25)  - PLC placed on admission for posa/vori coverage; vori w/ $1.60 copay, posa requiring PA.      # Neutropenic fever, fever resolved  # Influenza A  On admission, patient noted subjective fever with chills and rhinitis beginning 2/25 PM prior to admission. No other localizing s/sx of infection. She was afebrile on admission but spiked a low-grade fever to 100.7 on 2/26 PM. Blood and urine cultures negative, CXR with streaky opacities but no burt consolidation. Work-up positive for influenza A, and she completed a course of Tamiflu 75 mg BID x5 days (2/26-3/3). She also received a short empiric course of IV cefepime (2/26-3/1) given concurrent neutropenia.  - Continue droplet precautions   - Monitor for recurrent fevers    # R AOM with purulent effusion  # Small L TM perforation  Patient noted B/L ear pain, L>R s/p drainage from R side on 3/3. Noted h/o recurrent AOMs. On exam, TMs appear full with erythema to outer edges, patient noted pain w/ exam though able to tolerate. Small TM perforation noted to L side. Query if perforation from recurrent AOM or 2/2 congestion and fluid from recent influenza.  - ENT consulted, appreciate recs:  - Recommended 7-day course Augmentin (per cross-cover discussion with pharmacy, given h/o allergic reaction to Augmentin, increased dose of levofloxacin from 500 mg ? 750 mg daily x7 days (3/5-3/11).   - Recommended 5 day course of floxin drops to left ear for perforation (3/3-3/7)  - Follow up w/ PCP for left TM perforation - if persistent in 6-8 weeks recommend outpt ENT referral.     GI/   # GERD  Reported recent increase in symptoms in the days leading up to admission and initiated  famotidine.   - 3/9: Patient reported onset of midsternal chest pain that felt like reflux symptoms shortly after eating lunch.  EKG NSR.  States on 3/8, she experienced episode of emesis shortly after eating without preceding symptoms, no nausea at the time.  States this has happened before on occasion and has a history of this happening in the past.  No radiation of pain.  She also wondered if this may be related to the nicotine patch which she removed as she was also experiencing tachycardia, though had also been drinking energy drinks which she is trying to cut back on.  Pain is not reproducible on exam.  Also feels anxiety is attributing to some of her symptoms.  - Increase famotidine 10 mg BID ? 20 mg BID  - TUMS prn  - If ongoing symptoms of reflux, add protonix  - Plan for SLP consult if recurrence of symptoms    # Nausea/vomiting, recurrent/intermittent/resolved  Patient noted ongoing nausea w/ occasional vomiting starting Dec 2024. Has been managed with PRN Zofran.  Endorsed nausea on admission, now resolved.   - PRN Zofran and compazine  - Could consider trial of PRN Zyprexa if 3rd agent is desired/required     # Urinary frequency, resolved  # Dysuria, resolved  On admission, patient noted longstanding history of urinary frequency though new mild dysuria. Denies hematuria, bladder tenderness of CVA tenderness. UA (2/26) negative, UC with no growth. Symptoms have since resolved.     PULM  # COPD  Longstanding history of COPD. On admission patient reports symptoms are manageable and no recent exacerbations. Most recent PFTs (2018) were normal.  - Continue PTA Breo Ellipta inhaler and PRN DuoNebs      CV  # Essential hypertension  - Continue PTA lisinopril     # Mixed hyperlipidemia  Lipid panel has remained at goal, 1/14/25 panel w/ cholesterol 163, , LDL 92, HDL 45.   - Held PTA atorvastatin on admission due to chemotherapy interactions; will also likely have DDI with azole once started. Consider  rotation to rosuvastatin for better drug-drug interaction profile.     # H/o aortic stenosis  Patient noted on admission longstanding history of aortic stenosis. Pre-chemo echo w/ EF 60-65% and mild aortic stenosis and insufficiency. No previous echo to compare.      RENAL  # Stage 2 CKD  Baseline Cr ~ 0.7. On admission Cr 0.77.  - Continue to trend on daily labs and encourage PO hydration     NEURO  # Degenerative disc disease, L5-S1  - Continue PTA gabapentin     # Chronic migraine w/o aura  Present since childhood. Reportedly triggered by neck manipulation/movement. Reportedly well-managed with regimen below.  - Continue PTA sumatriptan and cyclobenzaprine PRN     ENDO  # Prediabetes  Last A1c 6.0 x12/9/24. Has been managing with lifestyle modifications.      MISC  # Rheumatoid arthritis   Patient reported trying treatment though was unable to tolerate well. Managed with Tylenol PRN. Most recnet RF >650 (2/10/25). JI negative.       # BROOKS  # MDD  # At risk for adjustment disorder  Patient reports good control of anxiety/depressive symptoms prior to admission. Did note feeling overwhelmed by new diagnosis. On admission discussed inpatient services such as health psychology or cordelia, she respectfully declined though will consider.   - Readdress health psych consult as indicated  - Continue PTA paroxetine   - Atarax as needed     # Vitamin D deficiency  Continue PTA vit D supplement    # Seasonal allergies - Claritin 10 mg daily     # Tobacco use   Has smoked since age 16, 1.5 ppd (roughly 65 pack years). Attempting to cut back as recently as 01/2025.   - Encourage smoking cessation  - Nicotine patch ordered  - Discussed option of smoking cessation team, patient platelet declined at this time.    Fluids/Electrolytes/Nutrition   - IVF prn   - PRN lyte replacement per standing protocol  - Regular diet as tolerated     Lines: PICC    PPX  VTE: Lovenox, hold for plts < 50k  GI: pepcid  Bowels: prn senna and  "miralax  Activity: as tolerated    Code: DNR/DNI    Medically Ready for Discharge: Anticipated in 5+ Days    Disposition: Admitted for further workup and management. Discharge pending treatment decisions and clinical course.   Follow-up: Will need to request APPT18 closer to discharge date and determine primary oncologist.     60 MINUTES SPENT BY ME on the date of service doing chart review, history, exam, documentation & further activities per the note.      Staffed with Dr. Hwang.    Zully Garcia PA-C  Hematology/Oncology  Pager: #[phone]     Interval History   No acute overnight events. Nursing notes reviewed.  Sleeping late in the morning, states she has been struggling a little bit more with anxiety and depression, especially being stuck inside the hospital and nearing the date of her son's passing (tomorrow).  Support offered.  Again discussed option of health psych and spiritual health which she politely declined.  Otherwise reports feeling physically well, no symptoms. A comprehensive review of symptoms was performed and was negative except as detailed in the interval history above.     Later paged by RN regarding midsternal chest pain. Assessed at bedside. Patient reported onset of non radiating midsternal chest pain that felt like reflux symptoms shortly after eating lunch.  EKG NSR.  States on 3/8, she experienced episode of emesis shortly after eating without preceding symptoms, no nausea at the time, and similar mid sternal discomfort that felt like her \"esophagus\".  States this has happened before on occasion and has a history of this happening in the past.  She also wondered if this may be related to the nicotine patch which she removed as she was also experiencing tachycardia, though had also been drinking energy drinks which she is trying to cut back on.  Pain is not reproducible on exam.  Also feels anxiety is attributing to some of her symptoms.  We discussed optimizing reflux medications " "with now being on steroids as part of her chemo regimen.  We discussed addition of Atarax as needed for anxiety as she felt like this was contributing to her symptoms.  We also discussed possibly having SLP assess her if this continues. She was agreeable to interventions. All questions addressed.    A comprehensive ROS was performed and was negative except as detailed in the HPI above.     Physical Exam   Temp: 97.6  F (36.4  C) Temp src: Oral BP: 106/68 Pulse: 92   Resp: 18 SpO2: 97 % O2 Device: None (Room air)    Vitals:    03/06/25 1212 03/07/25 1122 03/09/25 1206   Weight: 80.8 kg (178 lb 1.6 oz) 80.6 kg (177 lb 9.6 oz) 80.9 kg (178 lb 6.4 oz)     Vital Signs with Ranges  Temp:  [97.6  F (36.4  C)-98.3  F (36.8  C)] 97.6  F (36.4  C)  Pulse:  [71-92] 92  Resp:  [16-18] 18  BP: ()/(48-72) 106/68  SpO2:  [97 %-99 %] 97 %  I/O last 3 completed shifts:  In: 675.2 [P.O.:500; IV Piggyback:175.2]  Out: 1500 [Urine:1500]      Physical Exam:    Blood pressure 106/68, pulse 92, temperature 97.6  F (36.4  C), temperature source Oral, resp. rate 18, height 1.626 m (5' 4\"), weight 80.9 kg (178 lb 6.4 oz), last menstrual period 01/01/2007, SpO2 97%, not currently breastfeeding.    Constitutional: Pleasant and cooperative female, in NAD. Alert, interactive, non-toxic. Smiling and conversational.  HEENT: NC/AT, sclera clear, conjunctiva normal, OP with MMM, no discrete intraoral lesions or thrush, poor dentition  Respiratory: No increased work of breathing, CTAB, no crackles or wheezing.  Cardiovascular: RRR, harsh blowing systolic murmur (I-II/VI) heard at the LUSB. No peripheral edema. No calf tenderness or palpable cords.  GI: Normal bowel sounds. Abdomen is soft, non-distended and non-tender to palpation.  Skin: Warm, dry, well-perfused. No bruising, bleeding, rashes, or lesions on limited exam.  Musculoskeletal: Diminished muscle bulk, no gross deformities.  Neurologic: A&O. Answers questions appropriately, speech " normal. Moves all extremities spontaneously.  Psychiatric: Normal mood and affect.  Vascular access:  PICC on RUE CDI, non-tender, no surrounding erythema.    Medications   Current Facility-Administered Medications   Medication Dose Route Frequency Provider Last Rate Last Admin     Current Facility-Administered Medications   Medication Dose Route Frequency Provider Last Rate Last Admin    acyclovir (ZOVIRAX) tablet 400 mg  400 mg Oral BID Chelsie Murphy PA-C   400 mg at 03/09/25 1051    allopurinol (ZYLOPRIM) tablet 300 mg  300 mg Oral Daily Chelsie Davila PA-C   300 mg at 03/09/25 1051    Chemotherapy Infusing-Continuous Infusion   Does not apply Q8H Roney Hwang MD 43.8 mL/hr at 03/09/25 1311 Given at 03/09/25 1311    cytarabine (PF) (CYTOSAR) 190 mg in D5W 1,051.9 mL infusion  100 mg/m2 (Treatment Plan Recorded) Intravenous Q24H Roney Hwang MD 43.8 mL/hr at 03/09/25 0104 190 mg at 03/09/25 0104    enoxaparin ANTICOAGULANT (LOVENOX) injection 40 mg  40 mg Subcutaneous Q24H Chelsie Murphy PA-C   40 mg at 03/08/25 2002    famotidine (PEPCID) tablet 10 mg  10 mg Oral BID Chelsie Murphy PA-C   10 mg at 03/09/25 1051    fluticasone-vilanterol (BREO ELLIPTA) 100-25 MCG/ACT inhaler 1 puff  1 puff Inhalation Daily Chelsie Murhpy PA-C   1 puff at 03/09/25 1055    heparin lock flush 10 unit/mL injection 5-20 mL  5-20 mL Intracatheter Q24H Chelsie Murphy PA-C   5 mL at 03/09/25 0513    heparin lock flush 10 unit/mL injection 5-20 mL  5-20 mL Intracatheter Q24H Chelsie Murphy PA-C   5 mL at 03/07/25 2137    [START ON 3/11/2025] levofloxacin (LEVAQUIN) tablet 500 mg  500 mg Oral Daily Zully Garcia PA-C        levofloxacin (LEVAQUIN) tablet 750 mg  750 mg Oral Daily Zully Garcia PA-C   750 mg at 03/09/25 1051    lisinopril (ZESTRIL) tablet 10 mg  10 mg Oral Daily Jyoti Tenorio PA-C   10 mg at 03/08/25 0917     loratadine (CLARITIN) tablet 10 mg  10 mg Oral Daily Chelsie Murphy PA-C   10 mg at 03/09/25 1051    micafungin (MYCAMINE) 50 mg in sodium chloride 0.9 % 100 mL intermittent infusion  50 mg Intravenous Q24H Chelsie Murphy PA-C 100 mL/hr at 03/08/25 2002 50 mg at 03/08/25 2002    nicotine (NICODERM CQ) 14 MG/24HR 24 hr patch 1 patch  1 patch Transdermal Daily Chelsie Murphy PA-C   1 patch at 03/09/25 1056    ondansetron (ZOFRAN) tablet 8 mg  8 mg Oral Q12H Roney Hwang MD   8 mg at 03/09/25 1051    pantoprazole (PROTONIX) EC tablet 40 mg  40 mg Oral QAM AC Zully Garcia PA-C        PARoxetine (PAXIL) tablet 20 mg  20 mg Oral Chelsie Lowery PA-C   20 mg at 03/09/25 1051    sodium chloride (PF) 0.9% PF flush 10-40 mL  10-40 mL Intracatheter Q8H Chelsie Murphy PA-C   10 mL at 03/08/25 1549    Vitamin D3 (CHOLECALCIFEROL) tablet 1,000 Units  1,000 Units Oral Daily Chelsie Murphy PA-C   1,000 Units at 03/09/25 1051       Data   Results for orders placed or performed during the hospital encounter of 02/26/25 (from the past 24 hours)   CBC with platelets differential    Narrative    The following orders were created for panel order CBC with platelets differential.  Procedure                               Abnormality         Status                     ---------                               -----------         ------                     CBC with platelets and ...[[id number]]  Abnormal            Final result               RBC and Platelet Morpho...[[id number]]                      Final result                 Please view results for these tests on the individual orders.   Comprehensive metabolic panel   Result Value Ref Range    Sodium 134 (L) 135 - 145 mmol/L    Potassium 4.0 3.4 - 5.3 mmol/L    Carbon Dioxide (CO2) 22 22 - 29 mmol/L    Anion Gap 10 7 - 15 mmol/L    Urea Nitrogen 22.8 (H) 6.0 - 20.0 mg/dL    Creatinine 0.70 0.51 -  0.95 mg/dL    GFR Estimate >90 >60 mL/min/1.73m2    Calcium 8.5 (L) 8.8 - 10.4 mg/dL    Chloride 102 98 - 107 mmol/L    Glucose 103 (H) 70 - 99 mg/dL    Alkaline Phosphatase 143 40 - 150 U/L    AST 27 0 - 45 U/L    ALT 13 0 - 50 U/L    Protein Total 6.5 6.4 - 8.3 g/dL    Albumin 3.7 3.5 - 5.2 g/dL    Bilirubin Total 0.4 <=1.2 mg/dL   Magnesium   Result Value Ref Range    Magnesium 2.5 (H) 1.7 - 2.3 mg/dL   Lactate Dehydrogenase (aka LDH)   Result Value Ref Range    Lactate Dehydrogenase 379 (H) 0 - 250 U/L   Phosphorus   Result Value Ref Range    Phosphorus 4.8 (H) 2.5 - 4.5 mg/dL   Uric acid   Result Value Ref Range    Uric Acid 4.8 2.4 - 5.7 mg/dL   CBC with platelets and differential   Result Value Ref Range    WBC Count 1.2 (L) 4.0 - 11.0 10e3/uL    RBC Count 1.91 (L) 3.80 - 5.20 10e6/uL    Hemoglobin 7.4 (L) 11.7 - 15.7 g/dL    Hematocrit 21.5 (L) 35.0 - 47.0 %     (H) 78 - 100 fL    MCH 38.7 (H) 26.5 - 33.0 pg    MCHC 34.4 31.5 - 36.5 g/dL    RDW 14.7 10.0 - 15.0 %    Platelet Count 138 (L) 150 - 450 10e3/uL    % Neutrophils 21 %    % Lymphocytes 71 %    % Monocytes 8 %    % Eosinophils 0 %    % Basophils 0 %    % Immature Granulocytes 0 %    NRBCs per 100 WBC 0 <1 /100    Absolute Neutrophils 0.2 (LL) 1.6 - 8.3 10e3/uL    Absolute Lymphocytes 0.8 0.8 - 5.3 10e3/uL    Absolute Monocytes 0.1 0.0 - 1.3 10e3/uL    Absolute Eosinophils 0.0 0.0 - 0.7 10e3/uL    Absolute Basophils 0.0 0.0 - 0.2 10e3/uL    Absolute Immature Granulocytes 0.0 <=0.4 10e3/uL    Absolute NRBCs 0.0 10e3/uL   RBC and Platelet Morphology   Result Value Ref Range    RBC Morphology Confirmed RBC Indices     Platelet Assessment  Automated Count Confirmed. Platelet morphology is normal.     Automated Count Confirmed. Platelet morphology is normal.   EKG 12-lead, complete   Result Value Ref Range    Systolic Blood Pressure  mmHg    Diastolic Blood Pressure  mmHg    Ventricular Rate 69 BPM    Atrial Rate 69 BPM    KY Interval 124 ms    QRS  Duration 66 ms     ms    QTc 405 ms    P Axis 63 degrees    R AXIS 12 degrees    T Axis 31 degrees    Interpretation ECG       Sinus rhythm  Normal ECG  When compared with ECG of 27-Feb-2025 06:38,  No significant change was found

## 2025-03-09 NOTE — PLAN OF CARE
"/72 (BP Location: Left arm)   Pulse 76   Temp 97.9  F (36.6  C) (Oral)   Resp 16   Ht 1.626 m (5' 4\")   Wt 80.6 kg (177 lb 9.6 oz)   LMP 01/01/2007 (Approximate)   SpO2 99%   BMI 30.48 kg/m     Time: 5353-9620  Reason for admission: AML  Activity: independent.   Pain: denied pain or discomfort.   Neuro: alert and oriented.   Cardiac: WDL  Resp: denied SOB, lung sounds clear bilaterally.   GI/: on regular diet, voids without difficulties, bowel sounds present x four quadrants.   Lines: PICC chemo infusing.   Skin: WDL  Labs/Imaging: no lab or imaging this shift.   New changes to shift: no change.   Plan: continue with current plan  of cares.   "

## 2025-03-09 NOTE — PROGRESS NOTES
Nursing Focus: Chemotherapy  D: Positive blood return via PICC . Insertion site is clean/dry/intact, dressing intact with no complaints of pain.  Pre infusion assessment documented in Flowsheet (if applicable).    I: Premedications given per order (see electronic medical administration record). Dose #4 of Cytarabine started to infuse over 24 hours. Reviewed pt teaching on chemotherapy side effects.  Pt denies need for further teaching. Chemotherapy double checked per protocol by two chemotherapy competent RN's.   A: Tolerating infusion well. Denies nausea and or pain.   P: Continue to monitor urine output and symptoms of nausea. Screen for symptoms of toxicity.

## 2025-03-09 NOTE — PROVIDER NOTIFICATION
Provider Notification    Re: Patient's BP 88/62, then 89/57, asymptomatic. States she is eating and drinking fine.     Action: Notified Claritza Garcia PA-C via [employer]    Response: Provider and bedside RN aware, will round soon

## 2025-03-09 NOTE — PLAN OF CARE
Afebrile, hypotensive to 80s/60s, pt asymptomatic. Chemo infusing. Emesis x2. Pt stating she feels like things are getting caught in her throat prior to vomiting, but not nausea. Also experiencing periods of anxiety and feeling trapped in the unit. Atarax just ordered in anxious again. Tums x1 for heartburn. Up walking the halls multiple times.

## 2025-03-10 LAB
ABO + RH BLD: NORMAL
ALBUMIN SERPL BCG-MCNC: 3.7 G/DL (ref 3.5–5.2)
ALP SERPL-CCNC: 140 U/L (ref 40–150)
ALT SERPL W P-5'-P-CCNC: 13 U/L (ref 0–50)
ANION GAP SERPL CALCULATED.3IONS-SCNC: 9 MMOL/L (ref 7–15)
APTT PPP: 27 SECONDS (ref 22–38)
AST SERPL W P-5'-P-CCNC: 30 U/L (ref 0–45)
ATRIAL RATE - MUSE: 69 BPM
BASOPHILS # BLD AUTO: 0 10E3/UL (ref 0–0.2)
BASOPHILS NFR BLD AUTO: 0 %
BILIRUB SERPL-MCNC: 0.7 MG/DL
BLD GP AB SCN SERPL QL: NEGATIVE
BLD PROD TYP BPU: NORMAL
BLOOD COMPONENT TYPE: NORMAL
BUN SERPL-MCNC: 18.8 MG/DL (ref 6–20)
CALCIUM SERPL-MCNC: 8.7 MG/DL (ref 8.8–10.4)
CHLORIDE SERPL-SCNC: 101 MMOL/L (ref 98–107)
CODING SYSTEM: NORMAL
CREAT SERPL-MCNC: 0.69 MG/DL (ref 0.51–0.95)
CROSSMATCH: NORMAL
DIASTOLIC BLOOD PRESSURE - MUSE: NORMAL MMHG
EGFRCR SERPLBLD CKD-EPI 2021: >90 ML/MIN/1.73M2
EOSINOPHIL # BLD AUTO: 0 10E3/UL (ref 0–0.7)
EOSINOPHIL NFR BLD AUTO: 1 %
ERYTHROCYTE [DISTWIDTH] IN BLOOD BY AUTOMATED COUNT: 14.6 % (ref 10–15)
FIBRINOGEN PPP-MCNC: 395 MG/DL (ref 170–510)
GLUCOSE SERPL-MCNC: 109 MG/DL (ref 70–99)
HCO3 SERPL-SCNC: 25 MMOL/L (ref 22–29)
HCT VFR BLD AUTO: 19.5 % (ref 35–47)
HGB BLD-MCNC: 6.8 G/DL (ref 11.7–15.7)
IMM GRANULOCYTES # BLD: 0 10E3/UL
IMM GRANULOCYTES NFR BLD: 2 %
INR PPP: 1.03 (ref 0.85–1.15)
INTERPRETATION ECG - MUSE: NORMAL
ISSUE DATE AND TIME: NORMAL
LDH SERPL L TO P-CCNC: 364 U/L (ref 0–250)
LYMPHOCYTES # BLD AUTO: 0.5 10E3/UL (ref 0.8–5.3)
LYMPHOCYTES NFR BLD AUTO: 57 %
MAGNESIUM SERPL-MCNC: 2.7 MG/DL (ref 1.7–2.3)
MCH RBC QN AUTO: 38.4 PG (ref 26.5–33)
MCHC RBC AUTO-ENTMCNC: 34.9 G/DL (ref 31.5–36.5)
MCV RBC AUTO: 110 FL (ref 78–100)
MONOCYTES # BLD AUTO: 0.1 10E3/UL (ref 0–1.3)
MONOCYTES NFR BLD AUTO: 5 %
NEUTROPHILS # BLD AUTO: 0.3 10E3/UL (ref 1.6–8.3)
NEUTROPHILS NFR BLD AUTO: 34 %
NRBC # BLD AUTO: 0 10E3/UL
NRBC BLD AUTO-RTO: 0 /100
P AXIS - MUSE: 63 DEGREES
PHOSPHATE SERPL-MCNC: 4.3 MG/DL (ref 2.5–4.5)
PLAT MORPH BLD: ABNORMAL
PLATELET # BLD AUTO: 116 10E3/UL (ref 150–450)
POTASSIUM SERPL-SCNC: 4.4 MMOL/L (ref 3.4–5.3)
PR INTERVAL - MUSE: 124 MS
PROT SERPL-MCNC: 6.5 G/DL (ref 6.4–8.3)
QRS DURATION - MUSE: 66 MS
QT - MUSE: 378 MS
QTC - MUSE: 405 MS
R AXIS - MUSE: 12 DEGREES
RBC # BLD AUTO: 1.77 10E6/UL (ref 3.8–5.2)
RBC MORPH BLD: ABNORMAL
SODIUM SERPL-SCNC: 135 MMOL/L (ref 135–145)
SPECIMEN EXP DATE BLD: NORMAL
SYSTOLIC BLOOD PRESSURE - MUSE: NORMAL MMHG
T AXIS - MUSE: 31 DEGREES
UNIT ABO/RH: NORMAL
UNIT NUMBER: NORMAL
UNIT STATUS: NORMAL
UNIT TYPE ISBT: 6200
URATE SERPL-MCNC: 4.5 MG/DL (ref 2.4–5.7)
VARIANT LYMPHS BLD QL SMEAR: PRESENT
VENTRICULAR RATE- MUSE: 69 BPM
WBC # BLD AUTO: 0.9 10E3/UL (ref 4–11)

## 2025-03-10 PROCEDURE — 85025 COMPLETE CBC W/AUTO DIFF WBC: CPT

## 2025-03-10 PROCEDURE — P9016 RBC LEUKOCYTES REDUCED: HCPCS

## 2025-03-10 PROCEDURE — 82947 ASSAY GLUCOSE BLOOD QUANT: CPT

## 2025-03-10 PROCEDURE — 85730 THROMBOPLASTIN TIME PARTIAL: CPT | Performed by: PHYSICIAN ASSISTANT

## 2025-03-10 PROCEDURE — 86900 BLOOD TYPING SEROLOGIC ABO: CPT

## 2025-03-10 PROCEDURE — 82435 ASSAY OF BLOOD CHLORIDE: CPT

## 2025-03-10 PROCEDURE — 258N000003 HC RX IP 258 OP 636

## 2025-03-10 PROCEDURE — 83615 LACTATE (LD) (LDH) ENZYME: CPT

## 2025-03-10 PROCEDURE — 250N000013 HC RX MED GY IP 250 OP 250 PS 637: Performed by: PHYSICIAN ASSISTANT

## 2025-03-10 PROCEDURE — 83735 ASSAY OF MAGNESIUM: CPT

## 2025-03-10 PROCEDURE — 250N000011 HC RX IP 250 OP 636

## 2025-03-10 PROCEDURE — 84550 ASSAY OF BLOOD/URIC ACID: CPT | Performed by: PHYSICIAN ASSISTANT

## 2025-03-10 PROCEDURE — 250N000011 HC RX IP 250 OP 636: Performed by: INTERNAL MEDICINE

## 2025-03-10 PROCEDURE — 84100 ASSAY OF PHOSPHORUS: CPT | Performed by: PHYSICIAN ASSISTANT

## 2025-03-10 PROCEDURE — 82565 ASSAY OF CREATININE: CPT

## 2025-03-10 PROCEDURE — 258N000003 HC RX IP 258 OP 636: Performed by: INTERNAL MEDICINE

## 2025-03-10 PROCEDURE — 250N000013 HC RX MED GY IP 250 OP 250 PS 637

## 2025-03-10 PROCEDURE — 120N000002 HC R&B MED SURG/OB UMMC

## 2025-03-10 PROCEDURE — 85384 FIBRINOGEN ACTIVITY: CPT | Performed by: PHYSICIAN ASSISTANT

## 2025-03-10 PROCEDURE — 85610 PROTHROMBIN TIME: CPT | Performed by: PHYSICIAN ASSISTANT

## 2025-03-10 PROCEDURE — 99233 SBSQ HOSP IP/OBS HIGH 50: CPT | Mod: FS

## 2025-03-10 RX ORDER — POLYETHYLENE GLYCOL 3350 17 G/17G
17 POWDER, FOR SOLUTION ORAL DAILY
Status: DISCONTINUED | OUTPATIENT
Start: 2025-03-10 | End: 2025-03-11

## 2025-03-10 RX ADMIN — FAMOTIDINE 20 MG: 20 TABLET, FILM COATED ORAL at 19:45

## 2025-03-10 RX ADMIN — HYDROXYZINE HYDROCHLORIDE 25 MG: 25 TABLET, FILM COATED ORAL at 13:02

## 2025-03-10 RX ADMIN — LISINOPRIL 10 MG: 10 TABLET ORAL at 10:39

## 2025-03-10 RX ADMIN — PAROXETINE HYDROCHLORIDE 20 MG: 20 TABLET, FILM COATED ORAL at 10:39

## 2025-03-10 RX ADMIN — HYDROXYZINE HYDROCHLORIDE 25 MG: 25 TABLET, FILM COATED ORAL at 19:53

## 2025-03-10 RX ADMIN — ACYCLOVIR 400 MG: 400 TABLET ORAL at 10:39

## 2025-03-10 RX ADMIN — ALLOPURINOL 300 MG: 300 TABLET ORAL at 10:39

## 2025-03-10 RX ADMIN — ONDANSETRON HYDROCHLORIDE 8 MG: 8 TABLET, FILM COATED ORAL at 10:39

## 2025-03-10 RX ADMIN — FLUTICASONE PROPIONATE 2 SPRAY: 50 SPRAY, METERED NASAL at 10:41

## 2025-03-10 RX ADMIN — ONDANSETRON HYDROCHLORIDE 8 MG: 8 TABLET, FILM COATED ORAL at 21:29

## 2025-03-10 RX ADMIN — ENOXAPARIN SODIUM 40 MG: 40 INJECTION SUBCUTANEOUS at 19:45

## 2025-03-10 RX ADMIN — MICAFUNGIN SODIUM 50 MG: 50 INJECTION, POWDER, LYOPHILIZED, FOR SOLUTION INTRAVENOUS at 19:48

## 2025-03-10 RX ADMIN — FLUTICASONE FUROATE AND VILANTEROL TRIFENATATE 1 PUFF: 100; 25 POWDER RESPIRATORY (INHALATION) at 10:40

## 2025-03-10 RX ADMIN — PROCHLORPERAZINE MALEATE 5 MG: 5 TABLET ORAL at 13:01

## 2025-03-10 RX ADMIN — FAMOTIDINE 20 MG: 20 TABLET, FILM COATED ORAL at 10:39

## 2025-03-10 RX ADMIN — ACYCLOVIR 400 MG: 400 TABLET ORAL at 19:45

## 2025-03-10 RX ADMIN — CYTARABINE 190 MG: 100 INJECTION, SOLUTION INTRATHECAL; INTRAVENOUS; SUBCUTANEOUS at 02:21

## 2025-03-10 RX ADMIN — LEVOFLOXACIN 750 MG: 750 TABLET, FILM COATED ORAL at 10:39

## 2025-03-10 RX ADMIN — ACETAMINOPHEN 650 MG: 325 TABLET, FILM COATED ORAL at 19:53

## 2025-03-10 RX ADMIN — LORATADINE 10 MG: 10 TABLET ORAL at 10:39

## 2025-03-10 RX ADMIN — Medication 1000 UNITS: at 10:39

## 2025-03-10 RX ADMIN — FLUTICASONE PROPIONATE 2 SPRAY: 50 SPRAY, METERED NASAL at 10:40

## 2025-03-10 NOTE — PROGRESS NOTES
Afebrile, vss. Rbcs given for hgb of 6.8. Pt denies nausea and no emesis. Eating snack foods she has in the room. Had a BM today, but feels she has more to go. Refused miralax, but ordering prune juice with dinner. Up walking the halls multiple times. Atarax for anxiety very helpful. No further complaints at this time.

## 2025-03-10 NOTE — PROGRESS NOTES
"[facility]    Hematology / Oncology Progress Note  Hospital Day # 12  Date of Service (when I saw the patient): 03/10/2025     Assessment & Plan   Alejandra Villegas is a 59 year old female with a past medical history significant for COPD, CKD, migraines, rheumatoid arthritis, aortic stenosis, Afib, CHF, and anxiety who presented to an outside hospital with cytopenias and was found on BMBx to have hypercellular marrow with 4% blasts, 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 She was subsequently admitted to [facility] on 2/26/25 for further work-up and management and pathology re-review was most consistent with AML with NPM1 mutation by WHO 2022 (which does not require a specific blast threshold). Following further multidisciplinary discussion, she started intensive induction with 7+3 (D1=3/5/25).  Her course has been complicated by neutropenic fevers, influenza A, and AOM.     Today:  - D6 from 7+3 induction.  - Non radiating mid-sternal chest pain with associated reflux-like symptoms with anxiety x3/9 PM, now resolved patient attributing to anxiety. EKG NSR. Reported improvement w/ atarax for acute symptoms. We discussed different possible coping mechanisms for symptom management and health psych consult, patient respectfully declined.   - Ongoing GERD and N/V symptoms w/ new sensation of \"food caught in throat\" SLP eval placed 3/10. Increased famotidine to 20 mg BID x3/9, follow for response and could consider initiation of PPI.   - Hgb 6.8, transfuse 1 unit pRBCs.   - Complete course of PO levofloxacin 750 mg daily today for AOM, resume ppx x3/11 (ordered).  - LBM 3/7. Denies abdominal pain/cramping, continues to pass gas. Baseline constipation, agreeable to trial of senna/miralax today.   - Continue to encourage smoking cessation, patient is motivated. Utilizing nicotine patch. Smoking cessation team offered, patient politely declined at this time. "   - Patient endorsing more anxiety and depression today, discussed additional support with health psychology and spiritual health which she politely declined at this time. Continue to assess needs.     HEME  # AML with NPM1 mutation  Had been seen by PCP Dec 2024 w/ progressive fatigue and nausea where she was found leukopenic WBC  2.4 and neutropenic w/ ANC 0.3. Hgb 12. Was referred to local hematology/oncology clinic for further evaluation and seen by Dr. Harris. BMBx 2/10/25 done at OSH showed hypercellular marrow w/ trilineage hematopoiesis w/ dyspoiesis with mildly elevated blasts of 4% on morphology. NGS w/ NPM1, IDH2, and NRAS mutations. She was admitted to [facility] 2/26/25 for further workup and management. Slides were re-reviewed by [facility] Hematopathology, who noted hypercellular marrow with 8% blasts by morphology. Despite relatively low blast percentage, findings were felt most consistent with a diagnosis AML with NPM1 mutation by WHO 2022 (which does not require a specific blast threshold). Following interdepartmental discussions and review of the available literature, patient was started on intensive induction with 7+3 (Day 1=3/5/25).   - Baseline cardiopulmonary studies:  - Echo w/ EF 60-65%, mild known aortic stenosis.   - EKG (2/27) with NSR, QTc 412  - CXR (2/26) with mildly increased streaky bibasilar opacities, atelectasis or edema. No consolidation.   - Baseline viral serologies: CMV IgG+, EBV IgG+, HepB sAg-, HepB cAb-, HepB sAb-, HSV1+/2+, HIV-  - HLA Typing sent on admission. Message sent to notify BMT team x2/27 for IP consult.   - PICC placed 3/5/2025, plan to discontinue on discharge.        Treatment plan: 7+3 (C1D1=3/5/2025)    - Daunorubicin 60 mg/m  IV D1-3    - Cytarabine 100 mg/m  IV D1-7      - Pre-medications: zofran, dexamethasone 12 mg D1-3     # Pancytopenia  Secondary to underlying hematologic malignancy and exacerbated by recent chemotherapy.  - Follow daily CBC with differential  -  Transfuse to keep Hgb >7, plt >10K  - Blood consent obtained 2/26/25 on admission and placed in patient's paper chart.      # Risk for TLS  # Hyperphosphatemia, resolved  Secondary to hematologic malignancy, no evidence of TLS on admission.   - Allopurinol 300 mg daily x7 days (last dose 3/11), stop date placed  - Follow TLS labs daily    # Risk for DIC  Secondary to underlying hematologic malignancy.  - Follow coags every MWF  - Transfuse cryo to keep fibrinogen >100, FFP to keep INR <1.8     ID  # ID prophylaxis  - Acyclovir 400 mg BID  - Levaquin 500 mg daily, increased to 750 mg while covering AOM as below through 3/10  - Micafungin 50 mg daily; anticipate rotating to azole on ~D8 (3/12/25)  - PLC placed on admission for posa/vori coverage; vori w/ $1.60 copay, posa requiring PA.      # Neutropenic fever, resolved  # Influenza A  On admission, patient noted subjective fever with chills and rhinitis beginning 2/25 PM prior to admission. No other localizing s/sx of infection. She was afebrile on admission but spiked a low-grade fever to 100.7 on 2/26 PM. Blood and urine cultures negative, CXR with streaky opacities but no burt consolidation. Work-up positive for influenza A, and she completed a course of Tamiflu 75 mg BID x5 days (2/26-3/3). She also received a short empiric course of IV cefepime (2/26-3/1) given concurrent neutropenia.  - Continue droplet precautions   - Monitor for recurrent fevers    # R AOM with purulent effusion  # Small L TM perforation  Patient noted B/L ear pain, L>R s/p drainage from R side on 3/3. Noted h/o recurrent AOMs. On exam, TMs appear full with erythema to outer edges, patient noted pain w/ exam though able to tolerate. Small TM perforation noted to L side. Query if perforation from recurrent AOM or 2/2 congestion and fluid from recent influenza.  - ENT consulted, appreciate recs:  - Recommended 7-day course Augmentin (per cross-cover discussion with pharmacy, given h/o allergic  reaction to Augmentin, increased dose of levofloxacin from 500 mg ? 750 mg daily (3/5-3/11) then plan to deescalate to ppx levaquin 500 mg daily.   - 5 day course of floxin drops to left ear for perforation (3/3-3/7)  - Follow up w/ PCP for left TM perforation - if persistent in 6-8 weeks recommend outpt ENT referral.     GI/   # GERD  Reported recent increase in symptoms in the days leading up to admission and initiated famotidine.   - 3/9: Patient reported onset of midsternal chest pain that felt like reflux symptoms shortly after eating lunch.  EKG NSR.  States on 3/8, she experienced episode of emesis shortly after eating without preceding symptoms, no nausea at the time.  States this has happened before on occasion and has a history of this happening in the past.  No radiation of pain.  She also wondered if this may be related to the nicotine patch which she removed as she was also experiencing tachycardia, though had also been drinking energy drinks which she is trying to cut back on.  Pain is not reproducible on exam.  Also feels anxiety is attributing to some of her symptoms.  - Increase famotidine 10 mg BID ? 20 mg BID x3/9  - TUMS prn  - If ongoing symptoms of reflux, add protonix  - SLP consulted 3/10    # Nausea/vomiting, recurrent/intermittent/resolved  Patient noted ongoing nausea w/ occasional vomiting starting Dec 2024. Has been managed with PRN Zofran.  Endorsed nausea on admission, now resolved.   - PRN Zofran and compazine  - Could consider trial of PRN Zyprexa if 3rd agent is desired/required     # Urinary frequency, resolved  # Dysuria, resolved  On admission, patient noted longstanding history of urinary frequency though new mild dysuria. Denies hematuria, bladder tenderness of CVA tenderness. UA (2/26) negative, UC with no growth. Symptoms have since resolved.     PULM  # COPD  Longstanding history of COPD. On admission patient reports symptoms are manageable and no recent exacerbations.  Most recent PFTs (2018) were normal.  - Continue PTA Breo Ellipta inhaler and PRN DuoNebs      CV  # Essential hypertension  - Continue PTA lisinopril     # Mixed hyperlipidemia  Lipid panel has remained at goal, 1/14/25 panel w/ cholesterol 163, , LDL 92, HDL 45.   - Held PTA atorvastatin on admission due to chemotherapy interactions; will also likely have DDI with azole once started. Consider rotation to rosuvastatin for better drug-drug interaction profile.     # H/o aortic stenosis  Patient noted on admission longstanding history of aortic stenosis. Pre-chemo echo w/ EF 60-65% and mild aortic stenosis and insufficiency. No previous echo to compare.      RENAL  # Stage 2 CKD  Baseline Cr ~ 0.7. On admission Cr 0.77.  - Continue to trend on daily labs and encourage PO hydration     NEURO  # Degenerative disc disease, L5-S1  - Continue PTA gabapentin     # Chronic migraine w/o aura  Present since childhood. Reportedly triggered by neck manipulation/movement. Reportedly well-managed with regimen below.  - Continue PTA sumatriptan and cyclobenzaprine PRN     ENDO  # Prediabetes  Last A1c 6.0 x12/9/24. Has been managing with lifestyle modifications.      MISC  # Rheumatoid arthritis   Patient reported trying treatment though was unable to tolerate well. Managed with Tylenol PRN. Most recnet RF >650 (2/10/25). JI negative.       # BROOKS  # MDD  # At risk for adjustment disorder  Patient reports good control of anxiety/depressive symptoms prior to admission. Did note feeling overwhelmed by new diagnosis. On admission discussed inpatient services such as health psychology or cordelia, she respectfully declined though will consider.   - Readdress health psych consult as indicated  - Continue PTA paroxetine   - Atarax as needed     # Vitamin D deficiency  Continue PTA vit D supplement    # Seasonal allergies - Claritin 10 mg daily     # Tobacco use   Has smoked since age 16, 1.5 ppd (roughly 65 pack years). Attempting  to cut back as recently as 01/2025.   - Encourage smoking cessation  - Nicotine patch ordered  - Discussed option of smoking cessation team, patient platelet declined at this time.    Fluids/Electrolytes/Nutrition   - IVF prn   - PRN lyte replacement per standing protocol  - Regular diet as tolerated     Lines: PICC    PPX  VTE: Lovenox, hold for plts < 50k  GI: pepcid  Bowels: prn senna and miralax  Activity: as tolerated    Code: DNR/DNI    Medically Ready for Discharge: Anticipated in 5+ Days    Disposition: Admitted for further workup and management. Discharge pending treatment decisions and clinical course.   Follow-up: Will need to request APPT18 closer to discharge date and determine primary oncologist.     55 MINUTES SPENT BY ME on the date of service doing chart review, history, exam, documentation & further activities per the note.      Staffed with Dr. Hwang.    Chelsie Murphy PA-C  Hematology/Oncology  Pager #[phone]    Interval History   No acute overnight events. Nursing notes reviewed.      Alejandra is resting in bed this morning, reports feeling roughly the same as days prior w/ continued anxiety and depressive symptoms. We reviewed substernal chest pain yesterday to which she attributed to anxiety attack, no subsequent recurrences. We discussed different coping mechanisms that she could additionally try such as box breathing and 5 senses grounding methods when she starts to notice symptoms. Readdressed health psych consult to which she respectfully declined. Reviewed interval labs, plan for 1 unit pRBCs today. She otherwise denies fever, chills, mouth sores, SOB, cough, abdominal pain, diarrhea, constipation, numbness, tingling, swelling. Denies further questions or concerns at this time.      A comprehensive ROS was performed and was negative except as detailed in the HPI above.     Physical Exam   Temp: 98.5  F (36.9  C) Temp src: Oral BP: 93/65 Pulse: 81   Resp: 18 SpO2: 93 % O2  "Device: None (Room air)    Vitals:    03/06/25 1212 03/07/25 1122 03/09/25 1206   Weight: 80.8 kg (178 lb 1.6 oz) 80.6 kg (177 lb 9.6 oz) 80.9 kg (178 lb 6.4 oz)     Vital Signs with Ranges  Temp:  [97.6  F (36.4  C)-98.5  F (36.9  C)] 98.5  F (36.9  C)  Pulse:  [75-95] 81  Resp:  [16-20] 18  BP: ()/(57-77) 93/65  SpO2:  [93 %-99 %] 93 %  I/O last 3 completed shifts:  In: 1654.6 [P.O.:860; I.V.:50; IV Piggyback:744.6]  Out: 3450 [Urine:2450; Emesis/NG output:1000]      Physical Exam:    Blood pressure 93/65, pulse 81, temperature 98.5  F (36.9  C), temperature source Oral, resp. rate 18, height 1.626 m (5' 4\"), weight 80.9 kg (178 lb 6.4 oz), last menstrual period 01/01/2007, SpO2 93%, not currently breastfeeding.    Constitutional: Pleasant and cooperative female, in NAD. Alert, interactive, non-toxic. Smiling and conversational.  HEENT: NC/AT, sclera clear, conjunctiva normal, OP with MMM, no discrete intraoral lesions or thrush, poor dentition  Respiratory: No increased work of breathing, CTAB, no crackles or wheezing.  Cardiovascular: RRR, harsh blowing systolic murmur (I-II/VI) heard at the LUSB. No peripheral edema. No calf tenderness or palpable cords.  GI: Normal bowel sounds. Abdomen is soft, non-distended and non-tender to palpation.  Skin: Warm, dry, well-perfused. No bruising, bleeding, rashes, or lesions on limited exam.  Musculoskeletal: Diminished muscle bulk, no gross deformities.  Neurologic: A&O. Answers questions appropriately, speech normal. Moves all extremities spontaneously.  Psychiatric: Normal mood and affect.  Vascular access:  PICC on RUE CDI, non-tender, no surrounding erythema.    Medications   Current Facility-Administered Medications   Medication Dose Route Frequency Provider Last Rate Last Admin     Current Facility-Administered Medications   Medication Dose Route Frequency Provider Last Rate Last Admin    acyclovir (ZOVIRAX) tablet 400 mg  400 mg Oral BID Jeffrey, " DESEAN Holguin   400 mg at 03/09/25 2021    allopurinol (ZYLOPRIM) tablet 300 mg  300 mg Oral Daily Chelsie Davila PA-C   300 mg at 03/09/25 1051    Chemotherapy Infusing-Continuous Infusion   Does not apply Q8H Roney Hwang MD 43.8 mL/hr at 03/10/25 0500 Given at 03/10/25 0500    cytarabine (PF) (CYTOSAR) 190 mg in D5W 1,051.9 mL infusion  100 mg/m2 (Treatment Plan Recorded) Intravenous Q24H Roeny Hwang MD 43.8 mL/hr at 03/10/25 0221 190 mg at 03/10/25 0221    enoxaparin ANTICOAGULANT (LOVENOX) injection 40 mg  40 mg Subcutaneous Q24H Chelsie Murphy PA-C   40 mg at 03/09/25 2021    famotidine (PEPCID) tablet 20 mg  20 mg Oral BID Zully Garcia PA-C   20 mg at 03/09/25 2021    fluticasone-vilanterol (BREO ELLIPTA) 100-25 MCG/ACT inhaler 1 puff  1 puff Inhalation Daily Chelsie Murphy PA-C   1 puff at 03/09/25 1055    heparin lock flush 10 unit/mL injection 5-20 mL  5-20 mL Intracatheter Q24H Chelsie Murphy PA-C   5 mL at 03/09/25 0513    heparin lock flush 10 unit/mL injection 5-20 mL  5-20 mL Intracatheter Q24H Chelsie Murphy PA-C   5 mL at 03/07/25 2137    [START ON 3/11/2025] levofloxacin (LEVAQUIN) tablet 500 mg  500 mg Oral Daily Zully Garcia PA-C        levofloxacin (LEVAQUIN) tablet 750 mg  750 mg Oral Daily Zully Garcia PA-C   750 mg at 03/09/25 1051    lisinopril (ZESTRIL) tablet 10 mg  10 mg Oral Daily Jyoti Tenorio PA-C   10 mg at 03/08/25 0917    loratadine (CLARITIN) tablet 10 mg  10 mg Oral Daily Chelsie Murphy PA-C   10 mg at 03/09/25 1051    micafungin (MYCAMINE) 50 mg in sodium chloride 0.9 % 100 mL intermittent infusion  50 mg Intravenous Q24H Chelsie Murphy PA-C 100 mL/hr at 03/09/25 2138 50 mg at 03/09/25 2138    nicotine (NICODERM CQ) 14 MG/24HR 24 hr patch 1 patch  1 patch Transdermal Daily Chelsie Murphy PA-C   1 patch at 03/09/25 1056    ondansetron  (ZOFRAN) tablet 8 mg  8 mg Oral Q12H Roney Hwang MD   8 mg at 03/09/25 2021    PARoxetine (PAXIL) tablet 20 mg  20 mg Oral QAM Chelsie Murphy PA-C   20 mg at 03/09/25 1051    sodium chloride (PF) 0.9% PF flush 10-40 mL  10-40 mL Intracatheter Q8H Chelsie Murphy PA-C   10 mL at 03/08/25 1549    Vitamin D3 (CHOLECALCIFEROL) tablet 1,000 Units  1,000 Units Oral Daily Chelsie Murphy PA-C   1,000 Units at 03/09/25 1051       Data   Results for orders placed or performed during the hospital encounter of 02/26/25 (from the past 24 hours)   EKG 12-lead, complete   Result Value Ref Range    Systolic Blood Pressure  mmHg    Diastolic Blood Pressure  mmHg    Ventricular Rate 69 BPM    Atrial Rate 69 BPM    MA Interval 124 ms    QRS Duration 66 ms     ms    QTc 405 ms    P Axis 63 degrees    R AXIS 12 degrees    T Axis 31 degrees    Interpretation ECG       Sinus rhythm  Normal ECG  When compared with ECG of 27-Feb-2025 06:38,  No significant change was found     EKG 12-lead, complete   Result Value Ref Range    Systolic Blood Pressure  mmHg    Diastolic Blood Pressure  mmHg    Ventricular Rate 69 BPM    Atrial Rate 69 BPM    MA Interval 124 ms    QRS Duration 66 ms     ms    QTc 405 ms    P Axis 63 degrees    R AXIS 12 degrees    T Axis 31 degrees    Interpretation ECG       Sinus rhythm  Normal ECG  When compared with ECG of 27-Feb-2025 06:38,  No significant change was found     CBC with platelets differential    Narrative    The following orders were created for panel order CBC with platelets differential.  Procedure                               Abnormality         Status                     ---------                               -----------         ------                     CBC with platelets and ...[[id number]]  Abnormal            Preliminary result         RBC and Platelet Morpho...[[id number]]                      In process                   Please view  results for these tests on the individual orders.   ABO/Rh type and screen    Narrative    The following orders were created for panel order ABO/Rh type and screen.  Procedure                               Abnormality         Status                     ---------                               -----------         ------                     Adult Type and Screen[[id number]]                           Preliminary result           Please view results for these tests on the individual orders.   Comprehensive metabolic panel   Result Value Ref Range    Sodium 135 135 - 145 mmol/L    Potassium 4.4 3.4 - 5.3 mmol/L    Carbon Dioxide (CO2) 25 22 - 29 mmol/L    Anion Gap 9 7 - 15 mmol/L    Urea Nitrogen 18.8 6.0 - 20.0 mg/dL    Creatinine 0.69 0.51 - 0.95 mg/dL    GFR Estimate >90 >60 mL/min/1.73m2    Calcium 8.7 (L) 8.8 - 10.4 mg/dL    Chloride 101 98 - 107 mmol/L    Glucose 109 (H) 70 - 99 mg/dL    Alkaline Phosphatase 140 40 - 150 U/L    AST 30 0 - 45 U/L    ALT 13 0 - 50 U/L    Protein Total 6.5 6.4 - 8.3 g/dL    Albumin 3.7 3.5 - 5.2 g/dL    Bilirubin Total 0.7 <=1.2 mg/dL   Magnesium   Result Value Ref Range    Magnesium 2.7 (H) 1.7 - 2.3 mg/dL   Lactate Dehydrogenase (aka LDH)   Result Value Ref Range    Lactate Dehydrogenase 364 (H) 0 - 250 U/L   Fibrinogen activity   Result Value Ref Range    Fibrinogen Activity 395 170 - 510 mg/dL   INR   Result Value Ref Range    INR 1.03 0.85 - 1.15   Partial thromboplastin time   Result Value Ref Range    aPTT 27 22 - 38 Seconds   Phosphorus   Result Value Ref Range    Phosphorus 4.3 2.5 - 4.5 mg/dL   Uric acid   Result Value Ref Range    Uric Acid 4.5 2.4 - 5.7 mg/dL   CBC with platelets and differential   Result Value Ref Range    WBC Count 0.9 (LL) 4.0 - 11.0 10e3/uL    RBC Count 1.77 (L) 3.80 - 5.20 10e6/uL    Hemoglobin 6.8 (LL) 11.7 - 15.7 g/dL    Hematocrit 19.5 (L) 35.0 - 47.0 %     (H) 78 - 100 fL    MCH 38.4 (H) 26.5 - 33.0 pg    MCHC 34.9 31.5 - 36.5 g/dL    RDW  14.6 10.0 - 15.0 %    Platelet Count 116 (L) 150 - 450 10e3/uL   Adult Type and Screen   Result Value Ref Range    ABO/RH(D) A POS     SPECIMEN EXPIRATION DATE [id number]    CONDITIONAL Prepare red blood cells (unit)   Result Value Ref Range    Blood Component Type Red Blood Cells     Product Code [id number]     Unit Status Ready for issue     Unit Number [id number]     CROSSMATCH Compatible     CODING SYSTEM [id number]

## 2025-03-10 NOTE — PLAN OF CARE
"Goal Outcome Evaluation:       Assumed cares from 23:00 to 07:30,     Blood pressure 107/77, pulse 75, temperature 98.4  F (36.9  C), temperature source Oral, resp. rate 18, height 1.626 m (5' 4\"), weight 80.9 kg (178 lb 6.4 oz), last menstrual period 01/01/2007, SpO2 94%, not currently breastfeeding.     AVSS, A/O x 4 neuro intact. Up independently to the bathroom and she is voiding free, clear light orange color urine Cytarabine chemo drip going at 43.8 ML/hour and pt is tolerating it well. Chemo is running through red PICC lumen and purple lumen is with MIVF at TKO. Line with positive blood returns. She slept through cares and offered no complaints.. Continue to monitor pt on chemotherapy drip and follow plan of care.       Problem: Adult Inpatient Plan of Care  Goal: Plan of Care Review  Description: The Plan of Care Review/Shift note should be completed every shift.  The Outcome Evaluation is a brief statement about your assessment that the patient is improving, declining, or no change.  This information will be displayed automatically on your shift  note.  Outcome: Progressing  Goal: Patient-Specific Goal (Individualized)  Description: You can add care plan individualizations to a care plan. Examples of Individualization might be:  \"Parent requests to be called daily at 9am for status\", \"I have a hard time hearing out of my right ear\", or \"Do not touch me to wake me up as it startles  me\".  Outcome: Progressing  Goal: Absence of Hospital-Acquired Illness or Injury  Outcome: Progressing  Intervention: Identify and Manage Fall Risk  Recent Flowsheet Documentation  Taken 3/10/2025 0000 by Consuelo Malik RN  Safety Promotion/Fall Prevention:   clutter free environment maintained   mobility aid in reach   increase visualization of patient   nonskid shoes/slippers when out of bed   room organization consistent   room near nurse's station  Intervention: Prevent Skin Injury  Recent Flowsheet Documentation  Taken " 3/10/2025 0000 by Consuelo Malik RN  Body Position: position changed independently  Intervention: Prevent Infection  Recent Flowsheet Documentation  Taken 3/10/2025 0000 by Consuelo Malik RN  Infection Prevention:   hand hygiene promoted   single patient room provided   visitors restricted/screened   personal protective equipment utilized   rest/sleep promoted   environmental surveillance performed   equipment surfaces disinfected  Goal: Optimal Comfort and Wellbeing  Outcome: Progressing  Goal: Readiness for Transition of Care  Outcome: Progressing

## 2025-03-11 ENCOUNTER — APPOINTMENT (OUTPATIENT)
Dept: SPEECH THERAPY | Facility: CLINIC | Age: 60
End: 2025-03-11
Payer: MEDICARE

## 2025-03-11 LAB
ALBUMIN SERPL BCG-MCNC: 3.6 G/DL (ref 3.5–5.2)
ALP SERPL-CCNC: 144 U/L (ref 40–150)
ALT SERPL W P-5'-P-CCNC: 13 U/L (ref 0–50)
ANION GAP SERPL CALCULATED.3IONS-SCNC: 11 MMOL/L (ref 7–15)
AST SERPL W P-5'-P-CCNC: 31 U/L (ref 0–45)
BASOPHILS # BLD AUTO: 0 10E3/UL (ref 0–0.2)
BASOPHILS NFR BLD AUTO: 0 %
BILIRUB SERPL-MCNC: 0.6 MG/DL
BUN SERPL-MCNC: 17.3 MG/DL (ref 6–20)
CALCIUM SERPL-MCNC: 8.3 MG/DL (ref 8.8–10.4)
CHLORIDE SERPL-SCNC: 100 MMOL/L (ref 98–107)
CREAT SERPL-MCNC: 0.69 MG/DL (ref 0.51–0.95)
EGFRCR SERPLBLD CKD-EPI 2021: >90 ML/MIN/1.73M2
EOSINOPHIL # BLD AUTO: 0 10E3/UL (ref 0–0.7)
EOSINOPHIL NFR BLD AUTO: 1 %
ERYTHROCYTE [DISTWIDTH] IN BLOOD BY AUTOMATED COUNT: 18.7 % (ref 10–15)
GLUCOSE SERPL-MCNC: 173 MG/DL (ref 70–99)
HCO3 SERPL-SCNC: 22 MMOL/L (ref 22–29)
HCT VFR BLD AUTO: 21.4 % (ref 35–47)
HGB BLD-MCNC: 7.6 G/DL (ref 11.7–15.7)
IMM GRANULOCYTES # BLD: 0 10E3/UL
IMM GRANULOCYTES NFR BLD: 2 %
LDH SERPL L TO P-CCNC: 372 U/L (ref 0–250)
LYMPHOCYTES # BLD AUTO: 0.7 10E3/UL (ref 0.8–5.3)
LYMPHOCYTES NFR BLD AUTO: 62 %
MAGNESIUM SERPL-MCNC: 2.3 MG/DL (ref 1.7–2.3)
MCH RBC QN AUTO: 36.9 PG (ref 26.5–33)
MCHC RBC AUTO-ENTMCNC: 35.5 G/DL (ref 31.5–36.5)
MCV RBC AUTO: 104 FL (ref 78–100)
MISCELLANEOUS TEST 1 (ARUP): ABNORMAL
MONOCYTES # BLD AUTO: 0 10E3/UL (ref 0–1.3)
MONOCYTES NFR BLD AUTO: 2 %
NEUTROPHILS # BLD AUTO: 0.4 10E3/UL (ref 1.6–8.3)
NEUTROPHILS NFR BLD AUTO: 33 %
NRBC # BLD AUTO: 0 10E3/UL
NRBC BLD AUTO-RTO: 0 /100
PHOSPHATE SERPL-MCNC: 3.7 MG/DL (ref 2.5–4.5)
PLAT MORPH BLD: NORMAL
PLATELET # BLD AUTO: 95 10E3/UL (ref 150–450)
POTASSIUM SERPL-SCNC: 3.7 MMOL/L (ref 3.4–5.3)
PROT SERPL-MCNC: 6.3 G/DL (ref 6.4–8.3)
RBC # BLD AUTO: 2.06 10E6/UL (ref 3.8–5.2)
RBC MORPH BLD: NORMAL
SODIUM SERPL-SCNC: 133 MMOL/L (ref 135–145)
URATE SERPL-MCNC: 3.3 MG/DL (ref 2.4–5.7)
WBC # BLD AUTO: 1.1 10E3/UL (ref 4–11)

## 2025-03-11 PROCEDURE — 250N000013 HC RX MED GY IP 250 OP 250 PS 637

## 2025-03-11 PROCEDURE — 80053 COMPREHEN METABOLIC PANEL: CPT

## 2025-03-11 PROCEDURE — 258N000003 HC RX IP 258 OP 636: Performed by: INTERNAL MEDICINE

## 2025-03-11 PROCEDURE — 84100 ASSAY OF PHOSPHORUS: CPT | Performed by: PHYSICIAN ASSISTANT

## 2025-03-11 PROCEDURE — 83735 ASSAY OF MAGNESIUM: CPT

## 2025-03-11 PROCEDURE — 250N000011 HC RX IP 250 OP 636: Performed by: INTERNAL MEDICINE

## 2025-03-11 PROCEDURE — 250N000011 HC RX IP 250 OP 636

## 2025-03-11 PROCEDURE — 83615 LACTATE (LD) (LDH) ENZYME: CPT

## 2025-03-11 PROCEDURE — 85025 COMPLETE CBC W/AUTO DIFF WBC: CPT

## 2025-03-11 PROCEDURE — 120N000002 HC R&B MED SURG/OB UMMC

## 2025-03-11 PROCEDURE — 250N000013 HC RX MED GY IP 250 OP 250 PS 637: Performed by: PHYSICIAN ASSISTANT

## 2025-03-11 PROCEDURE — 85041 AUTOMATED RBC COUNT: CPT

## 2025-03-11 PROCEDURE — 258N000003 HC RX IP 258 OP 636

## 2025-03-11 PROCEDURE — 99233 SBSQ HOSP IP/OBS HIGH 50: CPT | Mod: FS

## 2025-03-11 PROCEDURE — 84550 ASSAY OF BLOOD/URIC ACID: CPT | Performed by: PHYSICIAN ASSISTANT

## 2025-03-11 PROCEDURE — 92526 ORAL FUNCTION THERAPY: CPT | Mod: GN | Performed by: SPEECH-LANGUAGE PATHOLOGIST

## 2025-03-11 PROCEDURE — 92610 EVALUATE SWALLOWING FUNCTION: CPT | Mod: GN | Performed by: SPEECH-LANGUAGE PATHOLOGIST

## 2025-03-11 RX ORDER — AMOXICILLIN 250 MG
2 CAPSULE ORAL 2 TIMES DAILY
Status: DISCONTINUED | OUTPATIENT
Start: 2025-03-11 | End: 2025-03-12

## 2025-03-11 RX ORDER — POLYETHYLENE GLYCOL 3350 17 G/17G
17 POWDER, FOR SOLUTION ORAL DAILY PRN
Status: DISCONTINUED | OUTPATIENT
Start: 2025-03-11 | End: 2025-04-06 | Stop reason: HOSPADM

## 2025-03-11 RX ORDER — AMOXICILLIN 250 MG
1 CAPSULE ORAL 2 TIMES DAILY
Status: DISCONTINUED | OUTPATIENT
Start: 2025-03-11 | End: 2025-03-12

## 2025-03-11 RX ADMIN — ACYCLOVIR 400 MG: 400 TABLET ORAL at 19:41

## 2025-03-11 RX ADMIN — ENOXAPARIN SODIUM 40 MG: 40 INJECTION SUBCUTANEOUS at 19:40

## 2025-03-11 RX ADMIN — ALLOPURINOL 300 MG: 300 TABLET ORAL at 10:37

## 2025-03-11 RX ADMIN — MICAFUNGIN SODIUM 50 MG: 50 INJECTION, POWDER, LYOPHILIZED, FOR SOLUTION INTRAVENOUS at 19:41

## 2025-03-11 RX ADMIN — PAROXETINE HYDROCHLORIDE 20 MG: 20 TABLET, FILM COATED ORAL at 10:37

## 2025-03-11 RX ADMIN — FLUTICASONE PROPIONATE 2 SPRAY: 50 SPRAY, METERED NASAL at 10:40

## 2025-03-11 RX ADMIN — LEVOFLOXACIN 500 MG: 500 TABLET, FILM COATED ORAL at 10:37

## 2025-03-11 RX ADMIN — CYTARABINE 190 MG: 100 INJECTION, SOLUTION INTRATHECAL; INTRAVENOUS; SUBCUTANEOUS at 01:55

## 2025-03-11 RX ADMIN — HYDROXYZINE HYDROCHLORIDE 25 MG: 25 TABLET, FILM COATED ORAL at 14:54

## 2025-03-11 RX ADMIN — FAMOTIDINE 20 MG: 20 TABLET, FILM COATED ORAL at 10:37

## 2025-03-11 RX ADMIN — FLUTICASONE FUROATE AND VILANTEROL TRIFENATATE 1 PUFF: 100; 25 POWDER RESPIRATORY (INHALATION) at 10:38

## 2025-03-11 RX ADMIN — ACETAMINOPHEN 650 MG: 325 TABLET, FILM COATED ORAL at 21:23

## 2025-03-11 RX ADMIN — HYDROXYZINE HYDROCHLORIDE 25 MG: 25 TABLET, FILM COATED ORAL at 21:22

## 2025-03-11 RX ADMIN — Medication 5 ML: at 21:28

## 2025-03-11 RX ADMIN — Medication 1000 UNITS: at 10:38

## 2025-03-11 RX ADMIN — ONDANSETRON HYDROCHLORIDE 8 MG: 8 TABLET, FILM COATED ORAL at 10:40

## 2025-03-11 RX ADMIN — FAMOTIDINE 20 MG: 20 TABLET, FILM COATED ORAL at 19:40

## 2025-03-11 RX ADMIN — HYDROXYZINE HYDROCHLORIDE 25 MG: 25 TABLET, FILM COATED ORAL at 02:02

## 2025-03-11 RX ADMIN — ACYCLOVIR 400 MG: 400 TABLET ORAL at 10:37

## 2025-03-11 RX ADMIN — ACETAMINOPHEN 650 MG: 325 TABLET, FILM COATED ORAL at 10:39

## 2025-03-11 RX ADMIN — PROCHLORPERAZINE MALEATE 5 MG: 5 TABLET ORAL at 13:40

## 2025-03-11 RX ADMIN — LORATADINE 10 MG: 10 TABLET ORAL at 10:37

## 2025-03-11 NOTE — PROGRESS NOTES
[id number]:  Pt resting overnight. VSS, on RA. Some chest discomfort reported, not cardiac but from anxiety per pt. Tylenol x1 and prn atarax x2 to help. Pt states she feels much better after atarax and that anxiety is better when she is walking round unit as she sometimes feels claustrophobic in room. UAL on unit and in room, steady on feet. +bs and flatus, -n/v, voiding fine. BM x1 before bed. PICC w/ CIVI cytarabine infusing, tolerating. No acute events this shift, pt doing well overnight.     Marivel Willett, RN on 3/11/2025 at 7:47 AM

## 2025-03-11 NOTE — PLAN OF CARE
Afebrile, hypotensive to 90s/50s. Pt asymptomatic. Up walking the halls very frequently. CIVI chemo infusing, tolerating well. Fair appetite, eating mostly chips and cookie snacks. Voiding well, had a BM today. Eager to be done with chemo so she can go outside. Salt water rinse given to keep mouth clean and pt stated that she frequently gets sores under upper plate, but no sores now.

## 2025-03-11 NOTE — PROGRESS NOTES
[facility] - [facility]    Hematology / Oncology Progress Note  Hospital Day # 13  Date of Service (when I saw the patient): 03/11/2025     Assessment & Plan   Alejandra Villegas is a 59 year old female with a past medical history significant for COPD, CKD, migraines, rheumatoid arthritis, aortic stenosis, Afib, CHF, and anxiety who presented to an outside hospital with cytopenias and was found on BMBx to have hypercellular marrow with 4% blasts, 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 She was subsequently admitted to [facility] on 2/26/25 for further work-up and management and pathology re-review was most consistent with AML with NPM1 mutation by WHO 2022 (which does not require a specific blast threshold). Following further multidisciplinary discussion, she started intensive induction with 7+3 (D1=3/5/25).  Her course has been complicated by neutropenic fevers, influenza A, and AOM.     Today:  - D7 from 7+3 induction.  - Small BM 3/10, patient agreeable to scheduled daily senna to continue to move bowels.   - Soft pressures (100s/50s-60s); denies dizziness, orthostatic hypotension, changes in vision. Hold lisinopril, resume pending daily trend. PM trend to 120s/70s, could consider 1 L NS bolus if recurrent/persistent.   - Ongoing discussion regarding symptoms of anxiety and depression, patient endorses continuous chemo limiting accessibility to previously frequent trips outside has affected symptoms, feeling hopeful w/ cytarabine to complete tomorrow PM. Discussed increase in dose of Paxil and further d/w health psych; she declined at this time. C/w prn atarax and Paxil.   - Continue with best supportive cares.     HEME  # AML with NPM1 mutation  Had been seen by PCP Dec 2024 w/ progressive fatigue and nausea where she was found leukopenic WBC  2.4 and neutropenic w/ ANC 0.3. Hgb 12. Was referred to local hematology/oncology clinic for further evaluation and  seen by Dr. Harris. BMBx 2/10/25 done at OSH showed hypercellular marrow w/ trilineage hematopoiesis w/ dyspoiesis with mildly elevated blasts of 4% on morphology. NGS w/ NPM1, IDH2, and NRAS mutations. She was admitted to [facility] 2/26/25 for further workup and management. Slides were re-reviewed by [facility], who noted hypercellular marrow with 8% blasts by morphology. Despite relatively low blast percentage, findings were felt most consistent with a diagnosis AML with NPM1 mutation by WHO 2022 (which does not require a specific blast threshold). Following interdepartmental discussions and review of the available literature, patient was started on intensive induction with 7+3 (Day 1=3/5/25).   - Baseline cardiopulmonary studies:  - Echo w/ EF 60-65%, mild known aortic stenosis.   - EKG (2/27) with NSR, QTc 412  - CXR (2/26) with mildly increased streaky bibasilar opacities, atelectasis or edema. No consolidation.   - Baseline viral serologies: CMV IgG+, EBV IgG+, HepB sAg-, HepB cAb-, HepB sAb-, HSV1+/2+, HIV-  - HLA Typing sent on admission. Message sent to notify BMT team x2/27 for IP consult.   - PICC placed 3/5/2025, plan to discontinue on discharge.        Treatment plan: 7+3 (C1D1=3/5/2025)    - Daunorubicin 60 mg/m  IV D1-3    - Cytarabine 100 mg/m  IV D1-7      - Pre-medications: zofran, dexamethasone 12 mg D1-3     # Pancytopenia  Secondary to underlying hematologic malignancy and exacerbated by recent chemotherapy.  - Follow daily CBC with differential  - Transfuse to keep Hgb >7, plt >10K  - Blood consent obtained 2/26/25 on admission and placed in patient's paper chart.      # Risk for TLS  # Hyperphosphatemia, resolved  Secondary to hematologic malignancy, no evidence of TLS on admission.   - Allopurinol 300 mg daily x7 days (through 3/11)  - Follow TLS labs daily    # Risk for DIC  Secondary to underlying hematologic malignancy.  - Follow coags every MWF  - Transfuse cryo to keep fibrinogen  >100, FFP to keep INR <1.8     ID  # ID prophylaxis  - Acyclovir 400 mg BID  - Levaquin 500 mg daily  - Micafungin 50 mg daily; consider rotating to azole on ~D8 (3/12/25) vs s/p D14 bmbx  - PLC placed on admission for posa/vori coverage; vori w/ $1.60 copay, posa requiring PA.      # Neutropenic fever, resolved  # Influenza A  On admission, patient noted subjective fever with chills and rhinitis beginning 2/25 PM prior to admission. No other localizing s/sx of infection. She was afebrile on admission but spiked a low-grade fever to 100.7 on 2/26 PM. Blood and urine cultures negative, CXR with streaky opacities but no burt consolidation. Work-up positive for influenza A, and she completed a course of Tamiflu 75 mg BID x5 days (2/26-3/3). She also received a short empiric course of IV cefepime (2/26-3/1) given concurrent neutropenia.  - Monitor for recurrent fevers    # R AOM with purulent effusion  # Small L TM perforation  Patient noted B/L ear pain, L>R s/p drainage from R side on 3/3. Noted h/o recurrent AOMs. On exam, TMs appear full with erythema to outer edges, patient noted pain w/ exam though able to tolerate. Small TM perforation noted to L side. Query if perforation from recurrent AOM or 2/2 congestion and fluid from recent influenza.  - ENT consulted, appreciate recs:  - Recommended 7-day course Augmentin (per cross-cover discussion with pharmacy, given h/o allergic reaction to Augmentin, increased dose of levofloxacin from 500 mg ? 750 mg daily (3/5-3/11) then plan to deescalate to ppx levaquin 500 mg daily.   - 5 day course of floxin drops to left ear for perforation (3/3-3/7)  - Follow up w/ PCP for left TM perforation - if persistent in 6-8 weeks recommend outpt ENT referral.     GI/   # GERD  Reported recent increase in symptoms in the days leading up to admission and initiated famotidine.   - 3/9: Patient reported onset of midsternal chest pain that felt like reflux symptoms shortly after eating  "lunch.  EKG NSR.  States on 3/8, she experienced episode of emesis shortly after eating without preceding symptoms, no nausea at the time.  States this has happened before on occasion and has a history of this happening in the past.  No radiation of pain.  She also wondered if this may be related to the nicotine patch which she removed as she was also experiencing tachycardia, though had also been drinking energy drinks which she is trying to cut back on.  Pain is not reproducible on exam.  Also feels anxiety is attributing to some of her symptoms.  - Increase famotidine 10 mg BID ? 20 mg BID x3/9. If ongoing symptoms of reflux, consider daily PPI  - TUMS prn  - SLP consulted 3/10 - Ok for regular diet w/ thin liquids    # Nausea/vomiting, recurrent/resolved  Patient noted ongoing nausea w/ occasional vomiting starting Dec 2024. Has been managed with PRN Zofran.  Endorsed nausea on admission, now resolved.   - PRN Zofran and compazine  - Could consider trial of PRN Zyprexa if 3rd agent is desired/required     # Urinary frequency, resolved  # Dysuria, resolved  On admission, patient noted longstanding history of urinary frequency though new mild dysuria. Denies hematuria, bladder tenderness of CVA tenderness. UA (2/26) negative, UC with no growth. Symptoms have since resolved.     PULM  # COPD  Longstanding history of COPD. On admission patient reports symptoms are manageable and no recent exacerbations. Most recent PFTs (2018) were normal.  - Continue PTA Breo Ellipta inhaler and PRN DuoNebs      CV  #Non-radiating chest pain, resolved  Patient noted new non-radiating mid-sternal chest pain with associated reflux-like symptoms with anxiety x3/9 after eating lunch. EKG showed NSR. Noted x3/8, experiencing episode of emesis shortly after eating without preceding symptoms, no nausea at the time, and similar mid sternal discomfort that felt like her \"esophagus\".  States this has happened before on occasion and has a " history of this happening in the past.  She also wondered if this may be related to the nicotine patch which she removed as she was also experiencing tachycardia, though had also been drinking energy drinks which she is trying to cut back on.  Pain not reproducible on exam.  Also feels anxiety is attributing to some of her symptoms as detailed below.     # Essential hypertension  - Continue PTA lisinopril - HELD 3/11 w/ soft BPs (90s-100s/50s-60s), resume pending daily trends     # Mixed hyperlipidemia  Lipid panel has remained at goal, 1/14/25 panel w/ cholesterol 163, , LDL 92, HDL 45.   - Held PTA atorvastatin on admission due to chemotherapy interactions; will also likely have DDI with azole once started. Consider rotation to rosuvastatin for better drug-drug interaction profile.     # H/o aortic stenosis  Patient noted on admission longstanding history of aortic stenosis. Pre-chemo echo w/ EF 60-65% and mild aortic stenosis and insufficiency. No previous echo to compare.      RENAL  # Stage 2 CKD  Baseline Cr ~ 0.7. On admission Cr 0.77.  - Continue to trend on daily labs and encourage PO hydration     NEURO  # Degenerative disc disease, L5-S1  - Continue PTA gabapentin     # Chronic migraine w/o aura  Present since childhood. Reportedly triggered by neck manipulation/movement. Reportedly well-managed with regimen below.  - Continue PTA sumatriptan and cyclobenzaprine PRN     ENDO  # Prediabetes  Last A1c 6.0 x12/9/24. Has been managing with lifestyle modifications.      MISC  # Rheumatoid arthritis   Patient reported trying treatment though was unable to tolerate well. Managed with Tylenol PRN. Most recnet RF >650 (2/10/25). JI negative.       # BROOKS  # MDD  # At risk for adjustment disorder  Patient reports good control of anxiety/depressive symptoms prior to admission. Did note feeling overwhelmed by new diagnosis. On admission discussed inpatient services such as health psychology or cordelia, she  respectfully declined though will consider. Ongoing discussion regarding symptoms of anxiety and depression continued throughout admission; patient endorses continuous chemo limiting accessibility to previously frequent trips outside has affected symptoms, feeling hopeful w/ cytarabine to complete tomorrow PM. Discussed increase in dose of Paxil and further d/w health psych; she declined at this time. C/w prn atarax and Paxil.   - Readdress health psych consult as indicated  - Continue PTA paroxetine   - Atarax as needed     # Vitamin D deficiency  Continue PTA vit D supplement    # Seasonal allergies - Claritin 10 mg daily     # Tobacco use   Has smoked since age 16, 1.5 ppd (roughly 65 pack years). Attempting to cut back as recently as 01/2025 to 0.5 ppd.   - Encourage smoking cessation, readdress NRT as needed.   - Nicotine patch ordered 2/28. Patient had been intermittently declining patch and reported associated increased symptoms of anxiety, and felt like patch precipitated anxiety attack w/ chest pain on 3/9. Discontinued 3/11, offered trial of lower dose patch or alternative NRT that patient declined.    - Discussed option of smoking cessation team, patient declined at this time.    Fluids/Electrolytes/Nutrition   - IVF prn   - PRN lyte replacement per standing protocol  - Regular diet as tolerated     Lines: PICC    PPX  VTE: Lovenox, hold for plts < 50k  GI: pepcid  Bowels: prn senna and miralax  Activity: as tolerated    Code: DNR/DNI    Medically Ready for Discharge: Anticipated in 5+ Days    Disposition: Admitted for further workup and management. Discharge pending treatment decisions and clinical course.   Follow-up: Will need to request APPT18 closer to discharge date and determine primary oncologist.     55 MINUTES SPENT BY ME on the date of service doing chart review, history, exam, documentation & further activities per the note.      Staffed with Dr. Hwang.    Chelsie Murphy,  "DESEAN  Hematology/Oncology  Pager #[phone]    Interval History   No acute overnight events. Nursing notes reviewed.      Alejandra is feeling well this morning, chatting with her sister over the phone. We reviewed interval labs and plan of care for today. Had a small BM yesterday, agreeable to trial of daily scheduled senna to continue to move bowel movements. Ongoing discussion regarding symptoms of anxiety and depression, patient endorses continuous chemo limiting accessibility to previously frequent trips outside has affected symptoms, feeling hopeful w/ cytarabine to complete tomorrow PM. Discussed increase in dose of Paxil and further d/w health psych; she declined at this time. Additionally endorses that she feels nicotine patch causing symptoms of anxiety and episode of substernal chest pain this past weekend, she has been declining patch and does not wish to continue, will discontinue patch. Discussed additional NRT though she declined. She otherwise denies fever, chills, mouth sores, SOB, cough, abdominal pain, diarrhea, constipation, nausea, vomiting, numbness, tingling, swelling. Denies further questions or concerns at this time.     A comprehensive ROS was performed and was negative except as detailed in the HPI above.     Physical Exam   Temp: 98.7  F (37.1  C) Temp src: Oral BP: 96/52 Pulse: 74   Resp: 18 SpO2: 95 % O2 Device: None (Room air)    Vitals:    03/07/25 1122 03/09/25 1206 03/10/25 1526   Weight: 80.6 kg (177 lb 9.6 oz) 80.9 kg (178 lb 6.4 oz) 80.1 kg (176 lb 9.6 oz)     Vital Signs with Ranges  Temp:  [98.1  F (36.7  C)-98.7  F (37.1  C)] 98.7  F (37.1  C)  Pulse:  [74-84] 74  Resp:  [18-22] 18  BP: ()/(52-74) 96/52  SpO2:  [95 %-98 %] 95 %  I/O last 3 completed shifts:  In: 2505.6 [P.O.:1580; IV Piggyback:625.6]  Out: 2200 [Urine:2200]      Physical Exam:    Blood pressure 96/52, pulse 74, temperature 98.7  F (37.1  C), temperature source Oral, resp. rate 18, height 1.626 m (5' 4\"), " weight 80.1 kg (176 lb 9.6 oz), last menstrual period 01/01/2007, SpO2 95%, not currently breastfeeding.    Constitutional: Pleasant and cooperative female, in NAD. Alert, interactive, non-toxic. Smiling and conversational.  HEENT: NC/AT, sclera clear, conjunctiva normal, OP with MMM, no discrete intraoral lesions or thrush, poor dentition  Respiratory: No increased work of breathing, CTAB, no crackles or wheezing.  Cardiovascular: RRR, harsh blowing systolic murmur (I-II/VI) heard at the LUSB. No peripheral edema. No calf tenderness or palpable cords.  GI: Normal bowel sounds. Abdomen is soft, non-distended and non-tender to palpation.  Skin: Warm, dry, well-perfused. No bruising, bleeding, rashes, or lesions on limited exam.  Musculoskeletal: Diminished muscle bulk, no gross deformities.  Neurologic: A&O. Answers questions appropriately, speech normal. Moves all extremities spontaneously.  Psychiatric: Normal mood and affect.  Vascular access:  PICC on RUE CDI, non-tender, no surrounding erythema.    Medications   Current Facility-Administered Medications   Medication Dose Route Frequency Provider Last Rate Last Admin     Current Facility-Administered Medications   Medication Dose Route Frequency Provider Last Rate Last Admin    acyclovir (ZOVIRAX) tablet 400 mg  400 mg Oral BID Chelsie Murphy PA-C   400 mg at 03/10/25 1945    allopurinol (ZYLOPRIM) tablet 300 mg  300 mg Oral Daily Chelsie Davila PA-C   300 mg at 03/10/25 1039    Chemotherapy Infusing-Continuous Infusion   Does not apply Q8H Roney Hwang MD 43.8 mL/hr at 03/10/25 1336 Given at 03/11/25 0625    cytarabine (PF) (CYTOSAR) 190 mg in D5W 1,051.9 mL infusion  100 mg/m2 (Treatment Plan Recorded) Intravenous Q24H Roney Hwang MD 43.8 mL/hr at 03/11/25 0155 190 mg at 03/11/25 0155    enoxaparin ANTICOAGULANT (LOVENOX) injection 40 mg  40 mg Subcutaneous Q24H Chelsie Murphy PA-C   40 mg at 03/10/25  1945    famotidine (PEPCID) tablet 20 mg  20 mg Oral BID Zully Garcia PA-C   20 mg at 03/10/25 1945    fluticasone-vilanterol (BREO ELLIPTA) 100-25 MCG/ACT inhaler 1 puff  1 puff Inhalation Daily Chelsie Murphy PA-C   1 puff at 03/10/25 1040    heparin lock flush 10 unit/mL injection 5-20 mL  5-20 mL Intracatheter Q24H Chelsie Murphy PA-C   5 mL at 03/09/25 0513    heparin lock flush 10 unit/mL injection 5-20 mL  5-20 mL Intracatheter Q24H Chelsie Murphy PA-C   5 mL at 03/07/25 2137    levofloxacin (LEVAQUIN) tablet 500 mg  500 mg Oral Daily Zully Garcia PA-C        [Held by provider] lisinopril (ZESTRIL) tablet 10 mg  10 mg Oral Daily Jyoti Tenorio PA-C   10 mg at 03/10/25 1039    loratadine (CLARITIN) tablet 10 mg  10 mg Oral Daily Chelsie Murphy PA-C   10 mg at 03/10/25 1039    micafungin (MYCAMINE) 50 mg in sodium chloride 0.9 % 100 mL intermittent infusion  50 mg Intravenous Q24H Chelsie Murphy PA-C 100 mL/hr at 03/10/25 1948 50 mg at 03/10/25 1948    ondansetron (ZOFRAN) tablet 8 mg  8 mg Oral Q12H Roney Hwang MD   8 mg at 03/10/25 2129    PARoxetine (PAXIL) tablet 20 mg  20 mg Oral QAM Chelsie Murphy PA-C   20 mg at 03/10/25 1039    senna-docusate (SENOKOT-S/PERICOLACE) 8.6-50 MG per tablet 1 tablet  1 tablet Oral BID Chelsie Murphy PA-C        Or    senna-docusate (SENOKOT-S/PERICOLACE) 8.6-50 MG per tablet 2 tablet  2 tablet Oral BID Chelsie Murphy PA-C        sodium chloride (PF) 0.9% PF flush 10-40 mL  10-40 mL Intracatheter Q8H Chelsie Murphy PA-C   10 mL at 03/08/25 1549    Vitamin D3 (CHOLECALCIFEROL) tablet 1,000 Units  1,000 Units Oral Daily Chelsie Murphy PA-C   1,000 Units at 03/10/25 1039       Data   Results for orders placed or performed during the hospital encounter of 02/26/25 (from the past 24 hours)   CBC with platelets differential    Narrative     The following orders were created for panel order CBC with platelets differential.  Procedure                               Abnormality         Status                     ---------                               -----------         ------                     CBC with platelets and ...[[id number]]  Abnormal            Final result               RBC and Platelet Morpho...[[id number]]                      Final result                 Please view results for these tests on the individual orders.   CBC with platelets and differential   Result Value Ref Range    WBC Count 1.1 (L) 4.0 - 11.0 10e3/uL    RBC Count 2.06 (L) 3.80 - 5.20 10e6/uL    Hemoglobin 7.6 (L) 11.7 - 15.7 g/dL    Hematocrit 21.4 (L) 35.0 - 47.0 %     (H) 78 - 100 fL    MCH 36.9 (H) 26.5 - 33.0 pg    MCHC 35.5 31.5 - 36.5 g/dL    RDW 18.7 (H) 10.0 - 15.0 %    Platelet Count 95 (L) 150 - 450 10e3/uL    % Neutrophils 33 %    % Lymphocytes 62 %    % Monocytes 2 %    % Eosinophils 1 %    % Basophils 0 %    % Immature Granulocytes 2 %    NRBCs per 100 WBC 0 <1 /100    Absolute Neutrophils 0.4 (LL) 1.6 - 8.3 10e3/uL    Absolute Lymphocytes 0.7 (L) 0.8 - 5.3 10e3/uL    Absolute Monocytes 0.0 0.0 - 1.3 10e3/uL    Absolute Eosinophils 0.0 0.0 - 0.7 10e3/uL    Absolute Basophils 0.0 0.0 - 0.2 10e3/uL    Absolute Immature Granulocytes 0.0 <=0.4 10e3/uL    Absolute NRBCs 0.0 10e3/uL   RBC and Platelet Morphology   Result Value Ref Range    RBC Morphology Confirmed RBC Indices     Platelet Assessment  Automated Count Confirmed. Platelet morphology is normal.     Automated Count Confirmed. Platelet morphology is normal.   Comprehensive metabolic panel   Result Value Ref Range    Sodium 133 (L) 135 - 145 mmol/L    Potassium 3.7 3.4 - 5.3 mmol/L    Carbon Dioxide (CO2) 22 22 - 29 mmol/L    Anion Gap 11 7 - 15 mmol/L    Urea Nitrogen 17.3 6.0 - 20.0 mg/dL    Creatinine 0.69 0.51 - 0.95 mg/dL    GFR Estimate >90 >60 mL/min/1.73m2    Calcium 8.3 (L) 8.8 - 10.4  mg/dL    Chloride 100 98 - 107 mmol/L    Glucose 173 (H) 70 - 99 mg/dL    Alkaline Phosphatase 144 40 - 150 U/L    AST 31 0 - 45 U/L    ALT 13 0 - 50 U/L    Protein Total 6.3 (L) 6.4 - 8.3 g/dL    Albumin 3.6 3.5 - 5.2 g/dL    Bilirubin Total 0.6 <=1.2 mg/dL   Magnesium   Result Value Ref Range    Magnesium 2.3 1.7 - 2.3 mg/dL   Lactate Dehydrogenase (aka LDH)   Result Value Ref Range    Lactate Dehydrogenase 372 (H) 0 - 250 U/L   Phosphorus   Result Value Ref Range    Phosphorus 3.7 2.5 - 4.5 mg/dL   Uric acid   Result Value Ref Range    Uric Acid 3.3 2.4 - 5.7 mg/dL

## 2025-03-11 NOTE — PROGRESS NOTES
"   03/11/25 1200   Appointment Info   Signing Clinician's Name / Credentials (SLP) Radha Rodriguez, SLP Student   Student Supervision On-site supervision provided   General Information   Onset of Illness/Injury or Date of Surgery 02/26/25   Referring Physician Chelsie Murphy PA-C   Patient/Family Therapy Goal Statement (SLP) None stated   Pertinent History of Current Problem Per provider report: \"Alejandra Villegas is a 59 year old female with a past medical history significant for COPD, CKD, migraines, rheumatoid arthritis, aortic stenosis, Afib, CHF, and anxiety who presented to an outside hospital with cytopenias and was found on BMBx to have hypercellular marrow with 4% blasts, 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 Following further multidisciplinary discussion, she was started on intensive induction with 7+3 (D1=3/5/25).  Her course has been complicated by neutropenic fevers, influenza A, and AOM.\" Clinical swallow evaluation completed per PA orders.   General Observations Pt upright in bed, conversant and pleasant.   Pain Assessment   Patient Currently in Pain No   Type of Evaluation   Type of Evaluation Swallow Evaluation   Oral Motor   Oral Musculature generally intact   Structural Abnormalities none present   Mucosal Quality good   Oral Motor Deficits Observed Dentition (Oral Motor) (Group)   Dentition (Oral Motor)   Comment, Dentition (Oral Motor) missing 2 bottom teeth   Dentition (Oral Motor) some missing teeth   Facial Symmetry (Oral Motor)   Facial Symmetry (Oral Motor) WNL   Lip Function (Oral Motor)   Lip Range of Motion (Oral Motor) WNL   Tongue Function (Oral Motor)   Tongue Coordination/Speed (Oral Motor) WNL   Tongue ROM (Oral Motor) WNL "   Cough/Swallow/Gag Reflex (Oral Motor)   Volitional Throat Clear/Cough (Oral Motor) WNL   Vocal Quality/Secretion Management (Oral Motor)   Vocal Quality (Oral Motor) WNL   Secretion Management (Oral Motor) WNL   General Swallowing Observations   Past History of Dysphagia No past hx of dysphagia per chart review.   Respiratory Support room air   Current Diet/Method of Nutritional Intake (General Swallowing Observations, NIS) thin liquids (level 0);regular diet   Swallowing Evaluation Clinical swallow evaluation   Clinical Swallow Evaluation   Feeding Assistance no assistance needed   Clinical Swallow Evaluation Textures Trialed thin liquids;solid foods   Clinical Swallow Eval: Thin Liquid Texture Trial   Mode of Presentation, Thin Liquids cup;self-fed   Volume of Liquid or Food Presented 4 oz   Oral Phase of Swallow WFL   Pharyngeal Phase of Swallow intact   Diagnostic Statement No overt s/sx of aspiration.   Clinical Swallow Evaluation: Solid Food Texture Trial   Mode of Presentation self-fed   Volume Presented 4 bites cracker   Oral Phase WFL   Pharyngeal Phase intact   Diagnostic Statement No overt s/sx of aspiration.   Esophageal Phase of Swallow   Patient reports or presents with symptoms of esophageal dysphagia Yes   Esophageal comments Hx of GERD, no globus sensation on exam but pt reports with dry solids (e.g. bread), she has recently been experiencing a globus sensation in her chest. Pt reports this happens during/immediately after the meal.  Pt burping x1 during exam.   Swallowing Recommendations   Diet Consistency Recommendations thin liquids (level 0);regular diet   Supervision Level for Intake patient independent   Mode of Delivery Recommendations bolus size, small;slow rate of intake   Swallowing Maneuver Recommendations alternate food and liquid intake   Monitoring/Assistance Required (Eating/Swallowing) stop eating activities when fatigue is present;monitor for cough or change in vocal quality with  intake   Recommended Feeding/Eating Techniques (Swallow Eval) maintain upright sitting position for eating;minimize distractions during oral intake;maintain upright posture during/after eating for 30 minutes   Medication Administration Recommendations, Swallowing (SLP) Meds OK as tolerated.   Instrumental Assessment Recommendations instrumental evaluation not recommended at this time   General Therapy Interventions   Planned Therapy Interventions Dysphagia Treatment   Dysphagia treatment Modified diet education;Instruction of safe swallow strategies;Compensatory strategies for swallowing   Clinical Impression   Criteria for Skilled Therapeutic Interventions Met (SLP Eval) Yes, treatment indicated   SLP Diagnosis mild esophageal dysphagia   Risks & Benefits of therapy have been explained evaluation/treatment results reviewed;care plan/treatment goals reviewed;risks/benefits reviewed;current/potential barriers reviewed;participants voiced agreement with care plan;participants included;patient   Clinical Impression Comments   Clinical swallow evaluation completed per PA orders. Pt presents with mild esophageal dysphagia in setting of GERD and recent hospital admission. Oral mech unremarkable, oral structures and musculature intact. Pt assessed with thin liquids and solids. Oral phase c/b functional mastication and clearance. Pharyngeal phase unremarkable, no overt s/sx of dysphagia. Pt endorses new episodes of globus sensation in chest, that occur intermittently when consuming dry solids (e.g. bread); did not occur during eval. Recommend regular diet with thin liquids, meds OK as tolerated. Pt should be seated fully upright and alert, taking small bites and sips, alternating solids/liquids, and at a slow rate. Pt should follow GERD precautions and remain upright for 1 hour after meals. SLP will follow for dysphagia management.     SLP Total Evaluation Time   Eval: oral/pharyngeal swallow function, clinical swallow  Minutes (23365) 10   SLP Time and Intention   Total Session Time (sum of timed and untimed services) 20

## 2025-03-12 LAB
ALBUMIN SERPL BCG-MCNC: 3.6 G/DL (ref 3.5–5.2)
ALP SERPL-CCNC: 132 U/L (ref 40–150)
ALT SERPL W P-5'-P-CCNC: 13 U/L (ref 0–50)
ANION GAP SERPL CALCULATED.3IONS-SCNC: 10 MMOL/L (ref 7–15)
APTT PPP: 34 SECONDS (ref 22–38)
AST SERPL W P-5'-P-CCNC: 24 U/L (ref 0–45)
BASOPHILS # BLD AUTO: 0 10E3/UL (ref 0–0.2)
BASOPHILS NFR BLD AUTO: 0 %
BILIRUB SERPL-MCNC: 0.9 MG/DL
BUN SERPL-MCNC: 16.6 MG/DL (ref 6–20)
CALCIUM SERPL-MCNC: 8.9 MG/DL (ref 8.8–10.4)
CHLORIDE SERPL-SCNC: 101 MMOL/L (ref 98–107)
CREAT SERPL-MCNC: 0.71 MG/DL (ref 0.51–0.95)
EGFRCR SERPLBLD CKD-EPI 2021: >90 ML/MIN/1.73M2
EOSINOPHIL # BLD AUTO: 0 10E3/UL (ref 0–0.7)
EOSINOPHIL NFR BLD AUTO: 0 %
ERYTHROCYTE [DISTWIDTH] IN BLOOD BY AUTOMATED COUNT: 17.8 % (ref 10–15)
FIBRINOGEN PPP-MCNC: 427 MG/DL (ref 170–510)
FRAGMENTS BLD QL SMEAR: SLIGHT
GLUCOSE SERPL-MCNC: 114 MG/DL (ref 70–99)
HCO3 SERPL-SCNC: 24 MMOL/L (ref 22–29)
HCT VFR BLD AUTO: 20.7 % (ref 35–47)
HGB BLD-MCNC: 7.3 G/DL (ref 11.7–15.7)
IMM GRANULOCYTES # BLD: 0 10E3/UL
IMM GRANULOCYTES NFR BLD: 0 %
INR PPP: 1.06 (ref 0.85–1.15)
LACTATE SERPL-SCNC: 0.9 MMOL/L (ref 0.7–2)
LDH SERPL L TO P-CCNC: 321 U/L (ref 0–250)
LYMPHOCYTES # BLD AUTO: 0.5 10E3/UL (ref 0.8–5.3)
LYMPHOCYTES NFR BLD AUTO: 69 %
MAGNESIUM SERPL-MCNC: 2.4 MG/DL (ref 1.7–2.3)
MCH RBC QN AUTO: 36.3 PG (ref 26.5–33)
MCHC RBC AUTO-ENTMCNC: 35.3 G/DL (ref 31.5–36.5)
MCV RBC AUTO: 103 FL (ref 78–100)
MONOCYTES # BLD AUTO: 0 10E3/UL (ref 0–1.3)
MONOCYTES NFR BLD AUTO: 5 %
NEUTROPHILS # BLD AUTO: 0.2 10E3/UL (ref 1.6–8.3)
NEUTROPHILS NFR BLD AUTO: 26 %
NRBC # BLD AUTO: 0 10E3/UL
NRBC BLD AUTO-RTO: 0 /100
PHOSPHATE SERPL-MCNC: 4.1 MG/DL (ref 2.5–4.5)
PLAT MORPH BLD: ABNORMAL
PLATELET # BLD AUTO: 79 10E3/UL (ref 150–450)
POTASSIUM SERPL-SCNC: 4.2 MMOL/L (ref 3.4–5.3)
PROT SERPL-MCNC: 6.3 G/DL (ref 6.4–8.3)
RBC # BLD AUTO: 2.01 10E6/UL (ref 3.8–5.2)
RBC MORPH BLD: ABNORMAL
SODIUM SERPL-SCNC: 135 MMOL/L (ref 135–145)
URATE SERPL-MCNC: 3.6 MG/DL (ref 2.4–5.7)
VARIANT LYMPHS BLD QL SMEAR: PRESENT
WBC # BLD AUTO: 0.7 10E3/UL (ref 4–11)

## 2025-03-12 PROCEDURE — 84550 ASSAY OF BLOOD/URIC ACID: CPT | Performed by: PHYSICIAN ASSISTANT

## 2025-03-12 PROCEDURE — 83605 ASSAY OF LACTIC ACID: CPT | Performed by: STUDENT IN AN ORGANIZED HEALTH CARE EDUCATION/TRAINING PROGRAM

## 2025-03-12 PROCEDURE — 85730 THROMBOPLASTIN TIME PARTIAL: CPT | Performed by: PHYSICIAN ASSISTANT

## 2025-03-12 PROCEDURE — 83735 ASSAY OF MAGNESIUM: CPT

## 2025-03-12 PROCEDURE — 250N000013 HC RX MED GY IP 250 OP 250 PS 637

## 2025-03-12 PROCEDURE — 85610 PROTHROMBIN TIME: CPT | Performed by: PHYSICIAN ASSISTANT

## 2025-03-12 PROCEDURE — 82565 ASSAY OF CREATININE: CPT

## 2025-03-12 PROCEDURE — 999N000044 HC STATISTIC CVC DRESSING CHANGE

## 2025-03-12 PROCEDURE — 82310 ASSAY OF CALCIUM: CPT

## 2025-03-12 PROCEDURE — 250N000011 HC RX IP 250 OP 636: Performed by: INTERNAL MEDICINE

## 2025-03-12 PROCEDURE — 120N000002 HC R&B MED SURG/OB UMMC

## 2025-03-12 PROCEDURE — 85384 FIBRINOGEN ACTIVITY: CPT | Performed by: PHYSICIAN ASSISTANT

## 2025-03-12 PROCEDURE — 258N000003 HC RX IP 258 OP 636

## 2025-03-12 PROCEDURE — 250N000011 HC RX IP 250 OP 636

## 2025-03-12 PROCEDURE — 99233 SBSQ HOSP IP/OBS HIGH 50: CPT | Mod: FS

## 2025-03-12 PROCEDURE — 258N000003 HC RX IP 258 OP 636: Performed by: INTERNAL MEDICINE

## 2025-03-12 PROCEDURE — 84100 ASSAY OF PHOSPHORUS: CPT | Performed by: PHYSICIAN ASSISTANT

## 2025-03-12 PROCEDURE — 83615 LACTATE (LD) (LDH) ENZYME: CPT

## 2025-03-12 PROCEDURE — 82247 BILIRUBIN TOTAL: CPT

## 2025-03-12 PROCEDURE — 85025 COMPLETE CBC W/AUTO DIFF WBC: CPT

## 2025-03-12 RX ORDER — AMOXICILLIN 250 MG
1 CAPSULE ORAL 2 TIMES DAILY PRN
Status: DISCONTINUED | OUTPATIENT
Start: 2025-03-12 | End: 2025-04-06 | Stop reason: HOSPADM

## 2025-03-12 RX ORDER — AMOXICILLIN 250 MG
2 CAPSULE ORAL 2 TIMES DAILY PRN
Status: DISCONTINUED | OUTPATIENT
Start: 2025-03-12 | End: 2025-04-06 | Stop reason: HOSPADM

## 2025-03-12 RX ADMIN — MICAFUNGIN SODIUM 50 MG: 50 INJECTION, POWDER, LYOPHILIZED, FOR SOLUTION INTRAVENOUS at 20:16

## 2025-03-12 RX ADMIN — LEVOFLOXACIN 500 MG: 500 TABLET, FILM COATED ORAL at 10:11

## 2025-03-12 RX ADMIN — LORATADINE 10 MG: 10 TABLET ORAL at 10:11

## 2025-03-12 RX ADMIN — ACYCLOVIR 400 MG: 400 TABLET ORAL at 20:16

## 2025-03-12 RX ADMIN — FAMOTIDINE 20 MG: 20 TABLET, FILM COATED ORAL at 10:12

## 2025-03-12 RX ADMIN — ACYCLOVIR 400 MG: 400 TABLET ORAL at 10:12

## 2025-03-12 RX ADMIN — FLUTICASONE FUROATE AND VILANTEROL TRIFENATATE 1 PUFF: 100; 25 POWDER RESPIRATORY (INHALATION) at 10:16

## 2025-03-12 RX ADMIN — FAMOTIDINE 20 MG: 20 TABLET, FILM COATED ORAL at 20:16

## 2025-03-12 RX ADMIN — Medication 1000 UNITS: at 10:11

## 2025-03-12 RX ADMIN — HEPARIN, PORCINE (PF) 10 UNIT/ML INTRAVENOUS SYRINGE 5 ML: at 05:01

## 2025-03-12 RX ADMIN — SENNOSIDES AND DOCUSATE SODIUM 2 TABLET: 50; 8.6 TABLET ORAL at 10:12

## 2025-03-12 RX ADMIN — CYTARABINE 190 MG: 100 INJECTION, SOLUTION INTRATHECAL; INTRAVENOUS; SUBCUTANEOUS at 01:48

## 2025-03-12 RX ADMIN — FLUTICASONE PROPIONATE 2 SPRAY: 50 SPRAY, METERED NASAL at 10:16

## 2025-03-12 RX ADMIN — ACETAMINOPHEN 650 MG: 325 TABLET, FILM COATED ORAL at 10:12

## 2025-03-12 RX ADMIN — ACETAMINOPHEN 650 MG: 325 TABLET, FILM COATED ORAL at 16:16

## 2025-03-12 RX ADMIN — HYDROXYZINE HYDROCHLORIDE 25 MG: 25 TABLET, FILM COATED ORAL at 21:58

## 2025-03-12 RX ADMIN — PAROXETINE HYDROCHLORIDE 20 MG: 20 TABLET, FILM COATED ORAL at 10:11

## 2025-03-12 RX ADMIN — HEPARIN, PORCINE (PF) 10 UNIT/ML INTRAVENOUS SYRINGE 5 ML: at 21:59

## 2025-03-12 RX ADMIN — ONDANSETRON HYDROCHLORIDE 8 MG: 8 TABLET, FILM COATED ORAL at 10:11

## 2025-03-12 RX ADMIN — HEPARIN, PORCINE (PF) 10 UNIT/ML INTRAVENOUS SYRINGE 5 ML: at 17:56

## 2025-03-12 RX ADMIN — HYDROXYZINE HYDROCHLORIDE 25 MG: 25 TABLET, FILM COATED ORAL at 12:56

## 2025-03-12 RX ADMIN — ENOXAPARIN SODIUM 40 MG: 40 INJECTION SUBCUTANEOUS at 20:16

## 2025-03-12 RX ADMIN — ONDANSETRON HYDROCHLORIDE 8 MG: 8 TABLET, FILM COATED ORAL at 01:12

## 2025-03-12 NOTE — PROGRESS NOTES
[facility] - [facility]    Hematology / Oncology Progress Note  Hospital Day # 14  Date of Service (when I saw the patient): 03/12/2025     Assessment & Plan   Alejandra Villegas is a 59 year old female with a past medical history significant for COPD, CKD, migraines, rheumatoid arthritis, aortic stenosis, Afib, CHF, and anxiety who presented to an outside hospital with cytopenias and was found on BMBx to have hypercellular marrow with 4% blasts, 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 She was subsequently admitted to [facility] on 2/26/25 for further work-up and management and pathology re-review was most consistent with AML with NPM1 mutation by WHO 2022 (which does not require a specific blast threshold). Following further multidisciplinary discussion, she started intensive induction with 7+3 (D1=3/5/25).  Her course has been complicated by neutropenic fevers, influenza A, AOM, and L TM perforation.     Today:  - D8 from 7+3 induction.  - Midcycle marrow scheduled 3/19 @ 1100, orders to be placed.   - Continue with best supportive cares.     HEME  # AML with NPM1 mutation  Had been seen by PCP Dec 2024 w/ progressive fatigue and nausea where she was found leukopenic WBC  2.4 and neutropenic w/ ANC 0.3. Hgb 12. Was referred to local hematology/oncology clinic for further evaluation and seen by Dr. Harris. BMBx 2/10/25 done at OSH showed hypercellular marrow w/ trilineage hematopoiesis w/ dyspoiesis with mildly elevated blasts of 4% on morphology. NGS w/ NPM1, IDH2, and NRAS mutations. She was admitted to [facility] 2/26/25 for further workup and management. Slides were re-reviewed by [facility] Hematopathology, who noted hypercellular marrow with 8% blasts by morphology. Despite relatively low blast percentage, findings were felt most consistent with a diagnosis AML with NPM1 mutation by WHO 2022 (which does not require a specific blast threshold). Following interdepartmental  discussions and review of the available literature, patient was started on intensive induction with 7+3 (Day 1=3/5/25).   - PICC placed 3/5/2025, plan to discontinue on discharge.   - Baseline cardiopulmonary studies:  - Echo w/ EF 60-65%, mild known aortic stenosis.   - EKG (2/27) with NSR, QTc 412  - CXR (2/26) with mildly increased streaky bibasilar opacities, atelectasis or edema. No consolidation.   - Baseline viral serologies: CMV IgG+, EBV IgG+, HepB sAg-, HepB cAb-, HepB sAb-, HSV1+/2+, HIV-  - HLA Typing sent on admission. Message sent to notify BMT team x2/27 for IP consult.   - Midcycle marrow scheduled 3/19 @ 1100, orders to be placed.        Treatment plan: 7+3 (C1D1=3/5/2025)    - Daunorubicin 60 mg/m  IV D1-3    - Cytarabine 100 mg/m  IV D1-7      - Pre-medications: zofran, dexamethasone 12 mg D1-3     # Pancytopenia  Secondary to underlying hematologic malignancy and exacerbated by recent chemotherapy.  - Follow daily CBC with differential  - Transfuse to keep Hgb >7, plt >10K  - Blood consent obtained 2/26/25 on admission and placed in patient's paper chart.      # Risk for TLS  # Hyperphosphatemia, resolved  Secondary to hematologic malignancy, no evidence of TLS on admission.   - Allopurinol 300 mg daily x7 days (through 3/11)  - Follow TLS labs daily    # Risk for DIC  Secondary to underlying hematologic malignancy.  - Follow coags every MWF  - Transfuse cryo to keep fibrinogen >100, FFP to keep INR <1.8     ID  # ID prophylaxis  - Acyclovir 400 mg BID  - Levaquin 500 mg daily  - Micafungin 50 mg daily; consider rotating to azole on ~D8 (3/12/25) vs s/p D14 bmbx  - PLC placed on admission for posa/vori coverage; vori w/ $1.60 copay, posa requiring PA.      # Neutropenic fever, resolved  # Influenza A, resolved  On admission, patient noted subjective fever with chills and rhinitis beginning 2/25 PM prior to admission. No other localizing s/sx of infection. She was afebrile on admission but spiked  a low-grade fever to 100.7 on 2/26 PM. Blood and urine cultures negative, CXR with streaky opacities but no burt consolidation. Work-up positive for influenza A, and she completed a course of Tamiflu 75 mg BID x5 days (2/26-3/3). She also received a short empiric course of IV cefepime (2/26-3/1) given concurrent neutropenia.  - Monitor for recurrent fevers    # R AOM with purulent effusion  # Small L TM perforation  Patient noted B/L ear pain, L>R s/p drainage from R side on 3/3. Noted h/o recurrent AOMs. On exam, TMs appear full with erythema to outer edges, patient noted pain w/ exam though able to tolerate. Small TM perforation noted to L side. Query if perforation from recurrent AOM or 2/2 congestion and fluid from recent influenza.  - ENT consulted, appreciate recs:  - Recommended 7-day course Augmentin (per cross-cover discussion with pharmacy, given h/o allergic reaction to Augmentin, increased dose of levofloxacin from 500 mg ? 750 mg daily (3/5-3/11) then plan to deescalate to ppx levaquin 500 mg daily.   - 5 day course of floxin drops to left ear for perforation (3/3-3/7)  - Follow up w/ PCP for left TM perforation - if persistent in 6-8 weeks recommend outpt ENT referral.     GI/   # GERD  Reported recent increase in symptoms in the days leading up to admission and initiated famotidine.   - 3/9: Patient reported onset of midsternal chest pain that felt like reflux symptoms shortly after eating lunch.  EKG NSR.  States on 3/8, she experienced episode of emesis shortly after eating without preceding symptoms, no nausea at the time.  States this has happened before on occasion and has a history of this happening in the past.  No radiation of pain.  She also wondered if this may be related to the nicotine patch which she removed as she was also experiencing tachycardia, though had also been drinking energy drinks which she is trying to cut back on.  Pain is not reproducible on exam.  Also feels anxiety is  "attributing to some of her symptoms.  - Increase famotidine 10 mg BID ? 20 mg BID x3/9. If ongoing symptoms of reflux, could consider daily PPI  - TUMS prn  - SLP consulted 3/10 - Ok for regular diet w/ thin liquids    # Nausea/vomiting, recurrent/resolved  Patient noted ongoing nausea w/ occasional vomiting starting Dec 2024. Has been managed with PRN Zofran.  Endorsed nausea on admission, now resolved.   - PRN Zofran and compazine  - Could consider trial of PRN Zyprexa if 3rd agent is desired/required     # Urinary frequency, resolved  # Dysuria, resolved  On admission, patient noted longstanding history of urinary frequency though new mild dysuria. Denies hematuria, bladder tenderness of CVA tenderness. UA (2/26) negative, UC with no growth. Symptoms have since resolved.     PULM  # COPD  Longstanding history of COPD. On admission patient reports symptoms are manageable and no recent exacerbations. Most recent PFTs (2018) were normal.  - Continue PTA Breo Ellipta inhaler and PRN DuoNebs      CV  #Non-radiating chest pain, resolved  Patient noted new non-radiating mid-sternal chest pain with associated reflux-like symptoms with anxiety x3/9 after eating lunch. EKG showed NSR. Noted x3/8, experiencing episode of emesis shortly after eating without preceding symptoms, no nausea at the time, and similar mid sternal discomfort that felt like her \"esophagus\".  States this has happened before on occasion and has a history of this happening in the past.  She also wondered if this may be related to the nicotine patch which she removed as she was also experiencing tachycardia, though had also been drinking energy drinks which she is trying to cut back on.  Pain not reproducible on exam.  Also feels anxiety is attributing to some of her symptoms as detailed below.     # Essential hypertension  - Continue PTA lisinopril - HELD 3/11 w/ soft BPs (90s-100s/50s-60s), resume pending daily trends     # Mixed hyperlipidemia  Lipid " panel has remained at goal, 1/14/25 panel w/ cholesterol 163, , LDL 92, HDL 45.   - Held PTA atorvastatin on admission due to chemotherapy interactions; will also likely have DDI with azole once started. Consider rotation to rosuvastatin for better drug-drug interaction profile.     # H/o aortic stenosis  Patient noted on admission longstanding history of aortic stenosis. Pre-chemo echo w/ EF 60-65% and mild aortic stenosis and insufficiency. No previous echo to compare.      RENAL  # Stage 2 CKD  Baseline Cr ~ 0.7. On admission Cr 0.77.  - Continue to trend on daily labs and encourage PO hydration     NEURO  # Degenerative disc disease, L5-S1  - Continue PTA gabapentin     # Chronic migraine w/o aura  Present since childhood. Reportedly triggered by neck manipulation/movement. Reportedly well-managed with regimen below.  - Continue PTA sumatriptan and cyclobenzaprine PRN     ENDO  # Prediabetes  Last A1c 6.0 x12/9/24. Has been managing with lifestyle modifications.      MISC  # Rheumatoid arthritis   Patient reported trying treatment though was unable to tolerate well. Managed with Tylenol PRN. Most recnet RF >650 (2/10/25). JI negative.       # BROOKS  # MDD  # At risk for adjustment disorder  Patient reports good control of anxiety/depressive symptoms prior to admission. Did note feeling overwhelmed by new diagnosis. On admission discussed inpatient services such as health psychology or cordelia, she respectfully declined though will consider. Ongoing discussion regarding symptoms of anxiety and depression continued throughout admission; patient endorses continuous chemo limiting accessibility to previously frequent trips outside has affected symptoms, feeling hopeful w/ cytarabine to complete tomorrow PM. Discussed increase in dose of Paxil and further d/w health psych; she declined at this time. C/w prn atarax and Paxil.   - Readdress health psych consult as indicated  - Continue PTA paroxetine   - Atarax as  needed     # Vitamin D deficiency  Continue PTA vit D supplement    # Seasonal allergies - Claritin 10 mg daily     # Tobacco use   Has smoked since age 16, 1.5 ppd (roughly 65 pack years). Attempting to cut back as recently as 01/2025 to 0.5 ppd.   - Encourage smoking cessation, readdress NRT as needed.   - Nicotine patch ordered 2/28. Patient had been intermittently declining patch and reported associated increased symptoms of anxiety, and felt like patch precipitated anxiety attack w/ chest pain on 3/9. Discontinued 3/11, offered trial of lower dose patch or alternative NRT that patient declined.    - Discussed option of smoking cessation team, patient declined at this time.    Fluids/Electrolytes/Nutrition   - IVF prn   - PRN lyte replacement per standing protocol  - Regular diet as tolerated     Lines: PICC    PPX  VTE: Lovenox, hold for plts < 50k  GI: pepcid  Bowels: prn senna and miralax  Activity: as tolerated    Code: DNR/DNI    Medically Ready for Discharge: Anticipated in 5+ Days    Disposition: Admitted for further workup and management. Discharge pending treatment decisions and clinical course.   Follow-up: Will need to request APPT18 closer to discharge date and determine primary oncologist.     55 MINUTES SPENT BY ME on the date of service doing chart review, history, exam, documentation & further activities per the note.      Staffed with Dr. Moreno.    Chelsie Murphy PA-C  Hematology/Oncology  Pager #[phone]    Interval History   No acute overnight events. Nursing notes reviewed.      Alejandra is in high spirits this morning, excited for completion of chemo early tomorrow AM and family to visit today. Seen ambulating around the unit today. We reviewed interval labs and plan of care for today. She is agreeable to a midcycle marrow at bedside next week, scheduled 3/19 @ 1100. Had good BM yesterday s/p senna. Denies new symptoms or concerns. Denies fever, chills, mouth sores, SOB, cough,  "abdominal pain, diarrhea, constipation, nausea, vomiting, numbness, tingling, swelling. Denies further questions or concerns at this time.      A comprehensive ROS was performed and was negative except as detailed in the HPI above.     Physical Exam   Temp: 98.4  F (36.9  C) Temp src: Oral BP: 95/70 Pulse: 79   Resp: 18 SpO2: 97 % O2 Device: None (Room air)    Vitals:    03/09/25 1206 03/10/25 1526 03/11/25 1010   Weight: 80.9 kg (178 lb 6.4 oz) 80.1 kg (176 lb 9.6 oz) 79.7 kg (175 lb 11.2 oz)     Vital Signs with Ranges  Temp:  [98.1  F (36.7  C)-98.7  F (37.1  C)] 98.4  F (36.9  C)  Pulse:  [71-83] 79  Resp:  [18-19] 18  BP: ()/(52-72) 95/70  SpO2:  [95 %-100 %] 97 %  I/O last 3 completed shifts:  In: 1951.2 [P.O.:900; IV Piggyback:1051.2]  Out: 2150 [Urine:2150]      Physical Exam:    Blood pressure 95/70, pulse 79, temperature 98.4  F (36.9  C), temperature source Oral, resp. rate 18, height 1.626 m (5' 4\"), weight 79.7 kg (175 lb 11.2 oz), last menstrual period 01/01/2007, SpO2 97%, not currently breastfeeding.    Constitutional: Pleasant and cooperative female, in NAD. Alert, interactive, non-toxic. Smiling and conversational.  HEENT: NC/AT, sclera clear, conjunctiva normal, OP with MMM, no discrete intraoral lesions or thrush, poor dentition  Respiratory: No increased work of breathing, CTAB, no crackles or wheezing.  Cardiovascular: RRR, harsh blowing systolic murmur (I-II/VI) heard at the LUSB. No peripheral edema. No calf tenderness or palpable cords.  GI: Normal bowel sounds. Abdomen is soft, non-distended and non-tender to palpation.  Skin: Warm, dry, well-perfused. No bruising, bleeding, rashes, or lesions on limited exam.  Musculoskeletal: Diminished muscle bulk, no gross deformities.  Neurologic: A&O. Answers questions appropriately, speech normal. Moves all extremities spontaneously.  Psychiatric: Normal mood and affect.  Vascular access:  PICC on RUE CDI, non-tender, no surrounding " erythema.    Medications   Current Facility-Administered Medications   Medication Dose Route Frequency Provider Last Rate Last Admin     Current Facility-Administered Medications   Medication Dose Route Frequency Provider Last Rate Last Admin    acyclovir (ZOVIRAX) tablet 400 mg  400 mg Oral BID Chelsie Murphy PA-C   400 mg at 03/11/25 1941    Chemotherapy Infusing-Continuous Infusion   Does not apply Q8H Roney Hwang MD 43.8 mL/hr at 03/12/25 0501 Given at 03/12/25 0501    cytarabine (PF) (CYTOSAR) 190 mg in D5W 1,051.9 mL infusion  100 mg/m2 (Treatment Plan Recorded) Intravenous Q24H Roney Hwang MD 43.8 mL/hr at 03/12/25 0148 190 mg at 03/12/25 0148    enoxaparin ANTICOAGULANT (LOVENOX) injection 40 mg  40 mg Subcutaneous Q24H Chelsie Murphy PA-C   40 mg at 03/11/25 1940    famotidine (PEPCID) tablet 20 mg  20 mg Oral BID Zully Garcia PA-C   20 mg at 03/11/25 1940    fluticasone-vilanterol (BREO ELLIPTA) 100-25 MCG/ACT inhaler 1 puff  1 puff Inhalation Daily Chelsie Murphy PA-C   1 puff at 03/11/25 1038    heparin lock flush 10 unit/mL injection 5-20 mL  5-20 mL Intracatheter Q24H Chelsie Murphy PA-C   5 mL at 03/12/25 0501    heparin lock flush 10 unit/mL injection 5-20 mL  5-20 mL Intracatheter Q24H Chelsie Murphy PA-C   5 mL at 03/11/25 2128    levofloxacin (LEVAQUIN) tablet 500 mg  500 mg Oral Daily Zully Garcia PA-C   500 mg at 03/11/25 1037    [Held by provider] lisinopril (ZESTRIL) tablet 10 mg  10 mg Oral Daily Jyoti Tenorio PA-C   10 mg at 03/10/25 1039    loratadine (CLARITIN) tablet 10 mg  10 mg Oral Daily Chelsie Murphy PA-C   10 mg at 03/11/25 1037    micafungin (MYCAMINE) 50 mg in sodium chloride 0.9 % 100 mL intermittent infusion  50 mg Intravenous Q24H Chelsie Murphy PA-C 100 mL/hr at 03/11/25 1941 50 mg at 03/11/25 1941    ondansetron (ZOFRAN) tablet 8 mg  8 mg Oral Q12H  Roney Hwang MD   8 mg at 03/12/25 0112    PARoxetine (PAXIL) tablet 20 mg  20 mg Oral QAM Chelsie Murphy PA-C   20 mg at 03/11/25 1037    senna-docusate (SENOKOT-S/PERICOLACE) 8.6-50 MG per tablet 1 tablet  1 tablet Oral BID Chelsie Murphy PA-C        Or    senna-docusate (SENOKOT-S/PERICOLACE) 8.6-50 MG per tablet 2 tablet  2 tablet Oral BID Chelsie Murphy PA-C        sodium chloride (PF) 0.9% PF flush 10-40 mL  10-40 mL Intracatheter Q8H Chelsie Murphy PA-C   10 mL at 03/11/25 1456    Vitamin D3 (CHOLECALCIFEROL) tablet 1,000 Units  1,000 Units Oral Daily Chelsie Murphy PA-C   1,000 Units at 03/11/25 1038       Data   Results for orders placed or performed during the hospital encounter of 02/26/25 (from the past 24 hours)   CBC with platelets differential    Narrative    The following orders were created for panel order CBC with platelets differential.  Procedure                               Abnormality         Status                     ---------                               -----------         ------                     CBC with platelets and ...[[id number]]  Abnormal            Final result               RBC and Platelet Morpho...[[id number]]  Abnormal            Final result                 Please view results for these tests on the individual orders.   Comprehensive metabolic panel   Result Value Ref Range    Sodium 135 135 - 145 mmol/L    Potassium 4.2 3.4 - 5.3 mmol/L    Carbon Dioxide (CO2) 24 22 - 29 mmol/L    Anion Gap 10 7 - 15 mmol/L    Urea Nitrogen 16.6 6.0 - 20.0 mg/dL    Creatinine 0.71 0.51 - 0.95 mg/dL    GFR Estimate >90 >60 mL/min/1.73m2    Calcium      Chloride 101 98 - 107 mmol/L    Glucose 114 (H) 70 - 99 mg/dL    Alkaline Phosphatase 132 40 - 150 U/L    AST 24 0 - 45 U/L    ALT 13 0 - 50 U/L    Protein Total 6.3 (L) 6.4 - 8.3 g/dL    Albumin 3.6 3.5 - 5.2 g/dL    Bilirubin Total 0.9 <=1.2 mg/dL   Magnesium   Result  Value Ref Range    Magnesium 2.4 (H) 1.7 - 2.3 mg/dL   Lactate Dehydrogenase (aka LDH)   Result Value Ref Range    Lactate Dehydrogenase 321 (H) 0 - 250 U/L   Fibrinogen activity   Result Value Ref Range    Fibrinogen Activity 427 170 - 510 mg/dL   INR   Result Value Ref Range    INR 1.06 0.85 - 1.15   Partial thromboplastin time   Result Value Ref Range    aPTT 34 22 - 38 Seconds   Phosphorus   Result Value Ref Range    Phosphorus 4.1 2.5 - 4.5 mg/dL   Uric acid   Result Value Ref Range    Uric Acid 3.6 2.4 - 5.7 mg/dL   CBC with platelets and differential   Result Value Ref Range    WBC Count 0.7 (LL) 4.0 - 11.0 10e3/uL    RBC Count 2.01 (L) 3.80 - 5.20 10e6/uL    Hemoglobin 7.3 (L) 11.7 - 15.7 g/dL    Hematocrit 20.7 (L) 35.0 - 47.0 %     (H) 78 - 100 fL    MCH 36.3 (H) 26.5 - 33.0 pg    MCHC 35.3 31.5 - 36.5 g/dL    RDW 17.8 (H) 10.0 - 15.0 %    Platelet Count 79 (L) 150 - 450 10e3/uL    % Neutrophils 26 %    % Lymphocytes 69 %    % Monocytes 5 %    % Eosinophils 0 %    % Basophils 0 %    % Immature Granulocytes 0 %    NRBCs per 100 WBC 0 <1 /100    Absolute Neutrophils 0.2 (LL) 1.6 - 8.3 10e3/uL    Absolute Lymphocytes 0.5 (L) 0.8 - 5.3 10e3/uL    Absolute Monocytes 0.0 0.0 - 1.3 10e3/uL    Absolute Eosinophils 0.0 0.0 - 0.7 10e3/uL    Absolute Basophils 0.0 0.0 - 0.2 10e3/uL    Absolute Immature Granulocytes 0.0 <=0.4 10e3/uL    Absolute NRBCs 0.0 10e3/uL   RBC and Platelet Morphology   Result Value Ref Range    RBC Morphology Confirmed RBC Indices     Platelet Assessment  Automated Count Confirmed. Platelet morphology is normal.     Automated Count Confirmed. Platelet morphology is normal.    RBC Fragments Slight (A) None Seen    Reactive Lymphocytes Present (A) None Seen

## 2025-03-12 NOTE — PLAN OF CARE
Afebile, vss. Last day of CIVI chemo infusing, tolerating well. Family at bedside and pt very happy today. Up walking the halls multiple times. LBM yesterday, voiding, not saving. Eating small meals and snacks. Denies nausea. Anxiety less so today because family visiting per patient. No further complaints at this time.

## 2025-03-12 NOTE — PLAN OF CARE
Goal Outcome Evaluation:      Plan of Care Reviewed With: patient    Overall Patient Progress: no changeOverall Patient Progress: no change    Outcome Evaluation: [id number]    C1D8 of 7+3  VSS on RA. Given tylenol for generalized body aches x1 and atarax for anxiety with relief. PICC infusing CIVI chemo, tolerating well. UAL. Voids spont without difficulty. Regular diet. No acute events during shift. Continue with POC.

## 2025-03-13 ENCOUNTER — APPOINTMENT (OUTPATIENT)
Dept: SPEECH THERAPY | Facility: CLINIC | Age: 60
End: 2025-03-13
Attending: STUDENT IN AN ORGANIZED HEALTH CARE EDUCATION/TRAINING PROGRAM
Payer: MEDICARE

## 2025-03-13 VITALS
HEART RATE: 90 BPM | OXYGEN SATURATION: 97 % | TEMPERATURE: 100.4 F | BODY MASS INDEX: 29.26 KG/M2 | SYSTOLIC BLOOD PRESSURE: 108 MMHG | DIASTOLIC BLOOD PRESSURE: 55 MMHG | WEIGHT: 171.4 LBS | RESPIRATION RATE: 16 BRPM | HEIGHT: 64 IN

## 2025-03-13 LAB
ABO + RH BLD: NORMAL
ALBUMIN SERPL BCG-MCNC: 3.7 G/DL (ref 3.5–5.2)
ALP SERPL-CCNC: 132 U/L (ref 40–150)
ALT SERPL W P-5'-P-CCNC: 11 U/L (ref 0–50)
ANION GAP SERPL CALCULATED.3IONS-SCNC: 10 MMOL/L (ref 7–15)
AST SERPL W P-5'-P-CCNC: 21 U/L (ref 0–45)
BASOPHILS # BLD MANUAL: 0 10E3/UL (ref 0–0.2)
BASOPHILS NFR BLD MANUAL: 0 %
BILIRUB SERPL-MCNC: 0.8 MG/DL
BLD GP AB SCN SERPL QL: NEGATIVE
BLOOD BANK CHART COMMENT: NORMAL
BUN SERPL-MCNC: 13.9 MG/DL (ref 6–20)
CALCIUM SERPL-MCNC: 8.8 MG/DL (ref 8.8–10.4)
CHLORIDE SERPL-SCNC: 97 MMOL/L (ref 98–107)
CREAT SERPL-MCNC: 0.72 MG/DL (ref 0.51–0.95)
EGFRCR SERPLBLD CKD-EPI 2021: >90 ML/MIN/1.73M2
EOSINOPHIL # BLD MANUAL: 0 10E3/UL (ref 0–0.7)
EOSINOPHIL NFR BLD MANUAL: 0 %
ERYTHROCYTE [DISTWIDTH] IN BLOOD BY AUTOMATED COUNT: 16.8 % (ref 10–15)
GLUCOSE SERPL-MCNC: 132 MG/DL (ref 70–99)
HCO3 SERPL-SCNC: 23 MMOL/L (ref 22–29)
HCT VFR BLD AUTO: 20.1 % (ref 35–47)
HGB BLD-MCNC: 7.1 G/DL (ref 11.7–15.7)
LACTATE SERPL-SCNC: 1 MMOL/L (ref 0.7–2)
LDH SERPL L TO P-CCNC: 308 U/L (ref 0–250)
LYMPHOCYTES # BLD MANUAL: 0.4 10E3/UL (ref 0.8–5.3)
LYMPHOCYTES NFR BLD MANUAL: 70 %
MAGNESIUM SERPL-MCNC: 2.1 MG/DL (ref 1.7–2.3)
MCH RBC QN AUTO: 37 PG (ref 26.5–33)
MCHC RBC AUTO-ENTMCNC: 35.3 G/DL (ref 31.5–36.5)
MCV RBC AUTO: 105 FL (ref 78–100)
MONOCYTES # BLD MANUAL: 0 10E3/UL (ref 0–1.3)
MONOCYTES NFR BLD MANUAL: 3 %
NEUTROPHILS # BLD MANUAL: 0.1 10E3/UL (ref 1.6–8.3)
NEUTROPHILS NFR BLD MANUAL: 26 %
PHOSPHATE SERPL-MCNC: 3.7 MG/DL (ref 2.5–4.5)
PLAT MORPH BLD: NORMAL
PLATELET # BLD AUTO: 58 10E3/UL (ref 150–450)
POTASSIUM SERPL-SCNC: 4.2 MMOL/L (ref 3.4–5.3)
PROT SERPL-MCNC: 6.4 G/DL (ref 6.4–8.3)
RBC # BLD AUTO: 1.92 10E6/UL (ref 3.8–5.2)
RBC MORPH BLD: NORMAL
SODIUM SERPL-SCNC: 130 MMOL/L (ref 135–145)
SPECIMEN EXP DATE BLD: NORMAL
URATE SERPL-MCNC: 3.6 MG/DL (ref 2.4–5.7)
WBC # BLD AUTO: 0.6 10E3/UL (ref 4–11)

## 2025-03-13 PROCEDURE — 120N000002 HC R&B MED SURG/OB UMMC

## 2025-03-13 PROCEDURE — 84550 ASSAY OF BLOOD/URIC ACID: CPT | Performed by: PHYSICIAN ASSISTANT

## 2025-03-13 PROCEDURE — 250N000013 HC RX MED GY IP 250 OP 250 PS 637

## 2025-03-13 PROCEDURE — 250N000013 HC RX MED GY IP 250 OP 250 PS 637: Performed by: STUDENT IN AN ORGANIZED HEALTH CARE EDUCATION/TRAINING PROGRAM

## 2025-03-13 PROCEDURE — 83735 ASSAY OF MAGNESIUM: CPT

## 2025-03-13 PROCEDURE — 99233 SBSQ HOSP IP/OBS HIGH 50: CPT | Mod: FS

## 2025-03-13 PROCEDURE — 80051 ELECTROLYTE PANEL: CPT

## 2025-03-13 PROCEDURE — 258N000003 HC RX IP 258 OP 636

## 2025-03-13 PROCEDURE — 250N000011 HC RX IP 250 OP 636

## 2025-03-13 PROCEDURE — 86900 BLOOD TYPING SEROLOGIC ABO: CPT

## 2025-03-13 PROCEDURE — 92526 ORAL FUNCTION THERAPY: CPT | Mod: GN

## 2025-03-13 PROCEDURE — 85027 COMPLETE CBC AUTOMATED: CPT

## 2025-03-13 PROCEDURE — 83615 LACTATE (LD) (LDH) ENZYME: CPT

## 2025-03-13 PROCEDURE — 86923 COMPATIBILITY TEST ELECTRIC: CPT

## 2025-03-13 PROCEDURE — 999N000007 HC SITE CHECK

## 2025-03-13 PROCEDURE — 82565 ASSAY OF CREATININE: CPT

## 2025-03-13 PROCEDURE — 83605 ASSAY OF LACTIC ACID: CPT | Performed by: STUDENT IN AN ORGANIZED HEALTH CARE EDUCATION/TRAINING PROGRAM

## 2025-03-13 PROCEDURE — 85007 BL SMEAR W/DIFF WBC COUNT: CPT

## 2025-03-13 PROCEDURE — 84100 ASSAY OF PHOSPHORUS: CPT | Performed by: PHYSICIAN ASSISTANT

## 2025-03-13 RX ORDER — VORICONAZOLE 200 MG/1
200 TABLET, FILM COATED ORAL EVERY 12 HOURS SCHEDULED
Status: DISCONTINUED | OUTPATIENT
Start: 2025-03-13 | End: 2025-03-17

## 2025-03-13 RX ADMIN — HEPARIN, PORCINE (PF) 10 UNIT/ML INTRAVENOUS SYRINGE 5 ML: at 06:44

## 2025-03-13 RX ADMIN — PROCHLORPERAZINE EDISYLATE 5 MG: 5 INJECTION INTRAMUSCULAR; INTRAVENOUS at 17:34

## 2025-03-13 RX ADMIN — HEPARIN, PORCINE (PF) 10 UNIT/ML INTRAVENOUS SYRINGE 5 ML: at 02:37

## 2025-03-13 RX ADMIN — ONDANSETRON 4 MG: 2 INJECTION, SOLUTION INTRAMUSCULAR; INTRAVENOUS at 13:15

## 2025-03-13 RX ADMIN — VORICONAZOLE 200 MG: 200 TABLET, FILM COATED ORAL at 13:16

## 2025-03-13 RX ADMIN — HYDROXYZINE HYDROCHLORIDE 25 MG: 25 TABLET, FILM COATED ORAL at 18:19

## 2025-03-13 RX ADMIN — Medication 1000 UNITS: at 09:54

## 2025-03-13 RX ADMIN — PROCHLORPERAZINE MALEATE 5 MG: 5 TABLET ORAL at 00:29

## 2025-03-13 RX ADMIN — SODIUM CHLORIDE 1000 ML: 0.9 INJECTION, SOLUTION INTRAVENOUS at 13:16

## 2025-03-13 RX ADMIN — FAMOTIDINE 20 MG: 20 TABLET, FILM COATED ORAL at 19:54

## 2025-03-13 RX ADMIN — ACETAMINOPHEN 650 MG: 325 TABLET, FILM COATED ORAL at 14:20

## 2025-03-13 RX ADMIN — CALCIUM CARBONATE (ANTACID) CHEW TAB 500 MG 500 MG: 500 CHEW TAB at 16:14

## 2025-03-13 RX ADMIN — ACYCLOVIR 400 MG: 400 TABLET ORAL at 09:54

## 2025-03-13 RX ADMIN — HEPARIN, PORCINE (PF) 10 UNIT/ML INTRAVENOUS SYRINGE 5 ML: at 18:44

## 2025-03-13 RX ADMIN — FAMOTIDINE 20 MG: 20 TABLET, FILM COATED ORAL at 09:54

## 2025-03-13 RX ADMIN — PAROXETINE HYDROCHLORIDE 20 MG: 20 TABLET, FILM COATED ORAL at 09:54

## 2025-03-13 RX ADMIN — FLUTICASONE FUROATE AND VILANTEROL TRIFENATATE 1 PUFF: 100; 25 POWDER RESPIRATORY (INHALATION) at 09:54

## 2025-03-13 RX ADMIN — ACYCLOVIR 400 MG: 400 TABLET ORAL at 19:54

## 2025-03-13 RX ADMIN — ACETAMINOPHEN 650 MG: 325 TABLET, FILM COATED ORAL at 22:38

## 2025-03-13 RX ADMIN — LORATADINE 10 MG: 10 TABLET ORAL at 09:54

## 2025-03-13 RX ADMIN — LEVOFLOXACIN 500 MG: 500 TABLET, FILM COATED ORAL at 09:54

## 2025-03-13 ASSESSMENT — ACTIVITIES OF DAILY LIVING (ADL)
ADLS_ACUITY_SCORE: 33
ADLS_ACUITY_SCORE: 37
ADLS_ACUITY_SCORE: 37
ADLS_ACUITY_SCORE: 33
ADLS_ACUITY_SCORE: 35
ADLS_ACUITY_SCORE: 35
ADLS_ACUITY_SCORE: 33
ADLS_ACUITY_SCORE: 39
ADLS_ACUITY_SCORE: 33
ADLS_ACUITY_SCORE: 35
ADLS_ACUITY_SCORE: 37
ADLS_ACUITY_SCORE: 35
ADLS_ACUITY_SCORE: 37
ADLS_ACUITY_SCORE: 33
ADLS_ACUITY_SCORE: 33

## 2025-03-13 NOTE — PROGRESS NOTES
Patient has been educated on potential risks of choosing to leave the unit and that the responsibility for patient well-being will belong to the patient. Pt has been informed that admission to hospital is due to need for medical treatment. Education given to the patient on some of the potential risks included but are not limited to:      - lack of access to nursing intervention      - possible missed appointments with MD, therapies, tests      - possible missed medications, antibiotics, management of IV's    Patient aware and understands risks.    Patient notified staff prior to leaving unit: [id number]  Coban wrap placed over PICC  prior to pt leaving unit.

## 2025-03-13 NOTE — PROGRESS NOTES
[facility]    Hematology / Oncology Progress Note  Hospital Day # 15  Date of Service (when I saw the patient): 03/13/2025     Assessment & Plan   Alejandra Villegas is a 59 year old female with a past medical history significant for COPD, CKD, migraines, rheumatoid arthritis, aortic stenosis, Afib, CHF, and anxiety who presented to an outside hospital with cytopenias and was found on BMBx to have hypercellular marrow with 4% blasts, 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 She was subsequently admitted to [facility] on 2/26/25 for further work-up and management and pathology re-review was most consistent with AML with NPM1 mutation by WHO 2022 (which does not require a specific blast threshold). Following further multidisciplinary discussion, she started intensive induction with 7+3 (D1=3/5/25).  Her course has been complicated by neutropenic fevers, influenza A, AOM, and L TM perforation.     Today:  - D9 from 7+3 induction. Completed last dose of cytarabine early this morning.  - Rotate deny to voriconazole w/ completion of chemo.   - Mild hyponatremia (130), asymptomatic. Trial administer 1 L NS. If persistent/worsening consider addition of salt tabs, hypertonic saline, and would obtain urine lytes studies .   - Midcycle marrow scheduled 3/19 @ 1100, orders to be placed.   - Continue with best supportive cares.     HEME  # AML with NPM1 mutation  Had been seen by PCP Dec 2024 w/ progressive fatigue and nausea where she was found leukopenic WBC  2.4 and neutropenic w/ ANC 0.3. Hgb 12. Was referred to local hematology/oncology clinic for further evaluation and seen by Dr. Harris. BMBx 2/10/25 done at OSH showed hypercellular marrow w/ trilineage hematopoiesis w/ dyspoiesis with mildly elevated blasts of 4% on morphology. NGS w/ NPM1, IDH2, and NRAS mutations. She was admitted to [facility] 2/26/25 for further workup and management. Slides were re-reviewed by  [facility], who noted hypercellular marrow with 8% blasts by morphology. Despite relatively low blast percentage, findings were felt most consistent with a diagnosis AML with NPM1 mutation by WHO 2022 (which does not require a specific blast threshold). Following interdepartmental discussions and review of the available literature, patient was started on intensive induction with 7+3 (Day 1=3/5/25).   - PICC placed 3/5/2025, plan to discontinue on discharge.   - Baseline cardiopulmonary studies:  - Echo w/ EF 60-65%, mild known aortic stenosis.   - EKG (2/27) with NSR, QTc 412  - CXR (2/26) with mildly increased streaky bibasilar opacities, atelectasis or edema. No consolidation.   - Baseline viral serologies: CMV IgG+, EBV IgG+, HepB sAg-, HepB cAb-, HepB sAb-, HSV1+/2+, HIV-  - HLA Typing sent on admission. Message sent to notify BMT team x2/27 for IP consult.   - Midcycle marrow scheduled 3/19 @ 1100, orders to be placed closer to date.        Treatment plan: 7+3 (C1D1=3/5/2025)    - Daunorubicin 60 mg/m  IV D1-3    - Cytarabine 100 mg/m  IV D1-7      - Pre-medications: zofran, dexamethasone 12 mg D1-3     # Pancytopenia  Secondary to underlying hematologic malignancy and exacerbated by recent chemotherapy.  - Follow daily CBC with differential  - Transfuse to keep Hgb >7, plt >10K  - Blood consent obtained 2/26/25 on admission and placed in patient's paper chart.      # Risk for TLS  # Hyperphosphatemia, resolved  Secondary to hematologic malignancy, no evidence of TLS on admission.   - Allopurinol 300 mg daily x7 days (through 3/11)  - Follow TLS labs daily    # Risk for DIC  Secondary to underlying hematologic malignancy.  - Follow coags every MWF  - Transfuse cryo to keep fibrinogen >100, FFP to keep INR <1.8     ID  # ID prophylaxis  - Acyclovir 400 mg BID  - Levaquin 500 mg daily  - Voriconazole 200 mg BID  - PLC placed on admission for posa/vori coverage; vori w/ $1.60 copay, posa requiring PA.  Rotated from deny to vori x3/13 w/ completion of chemotherapy.      # Neutropenic fever, resolved  # Influenza A, resolved  On admission, patient noted subjective fever with chills and rhinitis beginning 2/25 PM prior to admission. No other localizing s/sx of infection. She was afebrile on admission but spiked a low-grade fever to 100.7 on 2/26 PM. Blood and urine cultures negative, CXR with streaky opacities but no burt consolidation. Work-up positive for influenza A, and she completed a course of Tamiflu 75 mg BID x5 days (2/26-3/3). She also received a short empiric course of IV cefepime (2/26-3/1) given concurrent neutropenia.  - Monitor for recurrent fevers    # R AOM with purulent effusion, resolved  # Small L TM perforation, resolved  Patient noted B/L ear pain, L>R s/p drainage from R side on 3/3. Noted h/o recurrent AOMs. On exam, TMs appear full with erythema to outer edges, patient noted pain w/ exam though able to tolerate. Small TM perforation noted to L side. Query if perforation from recurrent AOM or 2/2 congestion and fluid from recent influenza.  - ENT consulted, appreciate recs:  - Recommended 7-day course Augmentin (per cross-cover discussion with pharmacy, given h/o allergic reaction to Augmentin, increased dose of levofloxacin from 500 mg ? 750 mg daily (3/5-3/11) then plan to deescalate to ppx levaquin 500 mg daily.   - 5 day course of floxin drops to left ear for perforation (3/3-3/7)  - Follow up w/ PCP for left TM perforation - if persistent in 6-8 weeks recommend outpt ENT referral.     GI/   # GERD  Reported recent increase in symptoms in the days leading up to admission and initiated famotidine.   - 3/9: Patient reported onset of midsternal chest pain that felt like reflux symptoms shortly after eating lunch.  EKG NSR.  States on 3/8, she experienced episode of emesis shortly after eating without preceding symptoms, no nausea at the time.  States this has happened before on occasion  "and has a history of this happening in the past.  No radiation of pain.  She also wondered if this may be related to the nicotine patch which she removed as she was also experiencing tachycardia, though had also been drinking energy drinks which she is trying to cut back on.  Pain is not reproducible on exam.  Also feels anxiety is attributing to some of her symptoms.  - Increase famotidine 10 mg BID ? 20 mg BID x3/9. If ongoing symptoms of reflux, could consider daily PPI  - TUMS prn  - SLP consulted 3/10 - Ok for regular diet w/ thin liquids    # Nausea/vomiting, recurrent/resolved  Patient noted ongoing nausea w/ occasional vomiting starting Dec 2024. Has been managed with PRN Zofran.  Endorsed nausea on admission, now resolved.   - PRN Zofran and compazine  - Could consider trial of PRN Zyprexa if 3rd agent is desired/required     # Urinary frequency, resolved  # Dysuria, resolved  On admission, patient noted longstanding history of urinary frequency though new mild dysuria. Denies hematuria, bladder tenderness of CVA tenderness. UA (2/26) negative, UC with no growth. Symptoms have since resolved.     PULM  # COPD  Longstanding history of COPD. On admission patient reports symptoms are manageable and no recent exacerbations. Most recent PFTs (2018) were normal.  - Continue PTA Breo Ellipta inhaler and PRN DuoNebs      CV  #Non-radiating chest pain, resolved  Patient noted new non-radiating mid-sternal chest pain with associated reflux-like symptoms with anxiety x3/9 after eating lunch. EKG showed NSR. Noted x3/8, experiencing episode of emesis shortly after eating without preceding symptoms, no nausea at the time, and similar mid sternal discomfort that felt like her \"esophagus\".  States this has happened before on occasion and has a history of this happening in the past.  She also wondered if this may be related to the nicotine patch which she removed as she was also experiencing tachycardia, though had also " been drinking energy drinks which she is trying to cut back on.  Pain not reproducible on exam.  Also feels anxiety is attributing to some of her symptoms as detailed below.     # Essential hypertension  - Continue PTA lisinopril - HELD 3/11 w/ soft BPs (90s-100s/50s-60s), resume pending daily trends     # Mixed hyperlipidemia  Lipid panel has remained at goal, 1/14/25 panel w/ cholesterol 163, , LDL 92, HDL 45.   - Held PTA atorvastatin on admission due to chemotherapy interactions; will also likely have DDI with azole once started. Consider rotation to rosuvastatin for better drug-drug interaction profile.     # H/o aortic stenosis  Patient noted on admission longstanding history of aortic stenosis. Pre-chemo echo w/ EF 60-65% and mild aortic stenosis and insufficiency. No previous echo to compare.      RENAL/FEN  # Stage 2 CKD  Baseline Cr ~ 0.7. On admission Cr 0.77.  - Continue to trend on daily labs and encourage PO hydration     # Mild hyponatremia  New mild hyponatremia noted 3/13 with Na 135 ? 130. Asymptomatic. Trial 1 L NS. If persistent/wrosening then could consider initiation of salt tabs, hypertonic saline, and would obtain further workup with urine lytes.     NEURO  # Degenerative disc disease, L5-S1  - Continue PTA gabapentin     # Chronic migraine w/o aura  Present since childhood. Reportedly triggered by neck manipulation/movement. Reportedly well-managed with regimen below.  - Continue PTA sumatriptan and cyclobenzaprine PRN     ENDO  # Prediabetes  Last A1c 6.0 x12/9/24. Has been managing with lifestyle modifications.      MISC  # Rheumatoid arthritis   Patient reported trying treatment though was unable to tolerate well. Managed with Tylenol PRN. Most recnet RF >650 (2/10/25). JI negative.       # BROOKS  # MDD  # At risk for adjustment disorder  Patient reports good control of anxiety/depressive symptoms prior to admission. Did note feeling overwhelmed by new diagnosis. On admission  discussed inpatient services such as health psychology or cordelia, she respectfully declined though will consider. Ongoing discussion regarding symptoms of anxiety and depression continued throughout admission; patient endorses continuous chemo limiting accessibility to previously frequent trips outside has affected symptoms, feeling hopeful w/ cytarabine to complete tomorrow PM. Discussed increase in dose of Paxil and further d/w health psych; she declined at this time. C/w prn atarax and Paxil.   - Readdress health psych consult as indicated  - Continue PTA paroxetine   - Atarax as needed     # Vitamin D deficiency  Continue PTA vit D supplement    # Seasonal allergies - Claritin 10 mg daily     # Tobacco use   Has smoked since age 16, 1.5 ppd (roughly 65 pack years). Attempting to cut back as recently as 01/2025 to 0.5 ppd. We discussed the risks of smoking during induction chemotherapy including increased risk for infection in setting of severe neutropenia that could further complicate course, even leading to death. She expressed understanding and appreciation of conversation.   - Encourage smoking cessation, readdress NRT as needed.   - Nicotine patch ordered 2/28. Patient had been intermittently declining patch and reported associated increased symptoms of anxiety, and felt like patch precipitated anxiety attack w/ chest pain on 3/9. Discontinued 3/11, offered trial of lower dose patch or alternative NRT that patient declined.    - Discussed option of smoking cessation team, patient declined at this time.    Fluids/Electrolytes/Nutrition   - IVF prn   - PRN lyte replacement per standing protocol  - Regular diet as tolerated     Lines: PICC    PPX  VTE: Lovenox, hold for plts < 50k  GI: pepcid  Bowels: prn senna and miralax  Activity: as tolerated    Code: DNR/DNI    Medically Ready for Discharge: Anticipated in 5+ Days    Disposition: Admitted for further workup and management. Discharge pending treatment  decisions and clinical course.   Follow-up: Will need to request APPT18 closer to discharge date and determine primary oncologist.     45 MINUTES SPENT BY ME on the date of service doing chart review, history, exam, documentation & further activities per the note.      Staffed with Dr. Moreno.    Chelsie Murphy PA-C  Hematology/Oncology  Pager #[phone]    Interval History   No acute overnight events. Nursing notes reviewed.      Alejandra is up sitting in bed, spirits remain high after completion of chemotherapy early this morning. We reviewed interval labs and plan of care for today, she expresses understanding and is agreeable. Reviewed indications for 4-6 week admission and though chemotherapy is completed, would expect another few weeks of admission. Additionally discussed the risks of smoking during induction chemotherapy including increased risk for infection in setting of severe neutropenia that could further complicate course and even lead to death. She expressed understanding and appreciation of conversation. Endorses nausea this morning, planning to ask for prn anti-emetics, additionally discussed supportive management including eating before morning meds if able. Denies fever, chills, mouth sores, SOB, cough, abdominal pain, diarrhea, constipation, nausea, vomiting, dysuria, hematuria, numbness, tingling, swelling. Denies further questions or concerns at this time.     A comprehensive ROS was performed and was negative except as detailed in the HPI above.     Physical Exam   Temp: 98.1  F (36.7  C) Temp src: Oral BP: 122/74 Pulse: 91   Resp: 18 SpO2: 99 % O2 Device: None (Room air)    Vitals:    03/10/25 1526 03/11/25 1010 03/13/25 1200   Weight: 80.1 kg (176 lb 9.6 oz) 79.7 kg (175 lb 11.2 oz) 77.7 kg (171 lb 6.4 oz)     Vital Signs with Ranges  Temp:  [98.1  F (36.7  C)-99.7  F (37.6  C)] 98.1  F (36.7  C)  Pulse:  [76-91] 91  Resp:  [16-20] 18  BP: (112-141)/(72-87) 122/74  SpO2:  [95 %-99 %] 99  "%  I/O last 3 completed shifts:  In: 990.4 [P.O.:500; I.V.:140; IV Piggyback:350.4]  Out: 2700 [Urine:2700]      Physical Exam:    Blood pressure 122/74, pulse 91, temperature 98.1  F (36.7  C), temperature source Oral, resp. rate 18, height 1.626 m (5' 4\"), weight 77.7 kg (171 lb 6.4 oz), last menstrual period 01/01/2007, SpO2 99%, not currently breastfeeding.    Constitutional: Pleasant and cooperative female, in NAD. Alert, interactive, non-toxic. Smiling and conversational.  HEENT: NC/AT, sclera clear, conjunctiva normal, OP with MMM, no discrete intraoral lesions or thrush, poor dentition  Respiratory: No increased work of breathing, CTAB, no crackles or wheezing.  Cardiovascular: RRR, harsh blowing systolic murmur (I-II/VI) heard at the LUSB. No peripheral edema. No calf tenderness or palpable cords.  GI: Normal bowel sounds. Abdomen is soft, non-distended and non-tender to palpation.  Skin: Warm, dry, well-perfused. No bruising, bleeding, rashes, or lesions on limited exam.  Musculoskeletal: Diminished muscle bulk, no gross deformities.  Neurologic: A&O. Answers questions appropriately, speech normal. Moves all extremities spontaneously.  Psychiatric: Normal mood and affect.  Vascular access:  PICC on RUE CDI, non-tender, no surrounding erythema.    Medications   Current Facility-Administered Medications   Medication Dose Route Frequency Provider Last Rate Last Admin     Current Facility-Administered Medications   Medication Dose Route Frequency Provider Last Rate Last Admin    acyclovir (ZOVIRAX) tablet 400 mg  400 mg Oral BID Chelsie Murphy PA-C   400 mg at 03/13/25 0954    famotidine (PEPCID) tablet 20 mg  20 mg Oral BID Zully Garcia PA-C   20 mg at 03/13/25 0954    fluticasone-vilanterol (BREO ELLIPTA) 100-25 MCG/ACT inhaler 1 puff  1 puff Inhalation Daily Chelsie Murphy PA-C   1 puff at 03/13/25 0954    heparin lock flush 10 unit/mL injection 5-20 mL  5-20 mL Intracatheter " Q24H Chelsie Murphy PA-C   5 mL at 03/13/25 0644    heparin lock flush 10 unit/mL injection 5-20 mL  5-20 mL Intracatheter Q24H Chelsie Murphy PA-C   5 mL at 03/11/25 2128    levofloxacin (LEVAQUIN) tablet 500 mg  500 mg Oral Daily Zully Garcia PA-C   500 mg at 03/13/25 0954    [Held by provider] lisinopril (ZESTRIL) tablet 10 mg  10 mg Oral Daily Jyoti Tenorio PA-C   10 mg at 03/10/25 1039    loratadine (CLARITIN) tablet 10 mg  10 mg Oral Daily Chelsie Murphy PA-C   10 mg at 03/13/25 0954    PARoxetine (PAXIL) tablet 20 mg  20 mg Oral QAM Chelsie Murphy PA-C   20 mg at 03/13/25 0954    sodium chloride (PF) 0.9% PF flush 10-40 mL  10-40 mL Intracatheter Q8H Chelsie Murphy PA-C   10 mL at 03/11/25 1456    sodium chloride 0.9% BOLUS 1,000 mL  1,000 mL Intravenous Once Chelsie Murphy PA-C        Vitamin D3 (CHOLECALCIFEROL) tablet 1,000 Units  1,000 Units Oral Daily Chelsie Murphy PA-C   1,000 Units at 03/13/25 0954    voriconazole (VFEND) tablet 200 mg  200 mg Oral Q12H [facility] (08/20) Chelsie Murphy PA-C           Data   Results for orders placed or performed during the hospital encounter of 02/26/25 (from the past 24 hours)   Lactic Acid Whole Blood w/ 1x repeat in 2 hrs when >2   Result Value Ref Range    Lactic Acid, Initial 0.9 0.7 - 2.0 mmol/L   CBC with platelets differential    Narrative    The following orders were created for panel order CBC with platelets differential.  Procedure                               Abnormality         Status                     ---------                               -----------         ------                     CBC with platelets and ...[[id number]]  Abnormal            Final result               RBC and Platelet Morpho...[[id number]]                      Final result               Manual Differential[[id number]]         Abnormal            Final result                 Please  view results for these tests on the individual orders.   ABO/Rh type and screen    Narrative    The following orders were created for panel order ABO/Rh type and screen.  Procedure                               Abnormality         Status                     ---------                               -----------         ------                     Adult Type and Screen[[id number]]                           Final result                 Please view results for these tests on the individual orders.   Comprehensive metabolic panel   Result Value Ref Range    Sodium 130 (L) 135 - 145 mmol/L    Potassium 4.2 3.4 - 5.3 mmol/L    Carbon Dioxide (CO2) 23 22 - 29 mmol/L    Anion Gap 10 7 - 15 mmol/L    Urea Nitrogen 13.9 6.0 - 20.0 mg/dL    Creatinine 0.72 0.51 - 0.95 mg/dL    GFR Estimate >90 >60 mL/min/1.73m2    Calcium 8.8 8.8 - 10.4 mg/dL    Chloride 97 (L) 98 - 107 mmol/L    Glucose 132 (H) 70 - 99 mg/dL    Alkaline Phosphatase 132 40 - 150 U/L    AST 21 0 - 45 U/L    ALT 11 0 - 50 U/L    Protein Total 6.4 6.4 - 8.3 g/dL    Albumin 3.7 3.5 - 5.2 g/dL    Bilirubin Total 0.8 <=1.2 mg/dL   Magnesium   Result Value Ref Range    Magnesium 2.1 1.7 - 2.3 mg/dL   Lactate Dehydrogenase (aka LDH)   Result Value Ref Range    Lactate Dehydrogenase 308 (H) 0 - 250 U/L   Phosphorus   Result Value Ref Range    Phosphorus 3.7 2.5 - 4.5 mg/dL   Uric acid   Result Value Ref Range    Uric Acid 3.6 2.4 - 5.7 mg/dL   CBC with platelets and differential   Result Value Ref Range    WBC Count 0.6 (LL) 4.0 - 11.0 10e3/uL    RBC Count 1.92 (L) 3.80 - 5.20 10e6/uL    Hemoglobin 7.1 (L) 11.7 - 15.7 g/dL    Hematocrit 20.1 (L) 35.0 - 47.0 %     (H) 78 - 100 fL    MCH 37.0 (H) 26.5 - 33.0 pg    MCHC 35.3 31.5 - 36.5 g/dL    RDW 16.8 (H) 10.0 - 15.0 %    Platelet Count 58 (L) 150 - 450 10e3/uL   Adult Type and Screen   Result Value Ref Range    SPECIMEN EXPIRATION DATE [id number]    RBC and Platelet Morphology   Result Value Ref Range    RBC  Morphology Confirmed RBC Indices     Platelet Assessment  Automated Count Confirmed. Platelet morphology is normal.     Automated Count Confirmed. Platelet morphology is normal.   Manual Differential   Result Value Ref Range    % Neutrophils 26 %    % Lymphocytes 70 %    % Monocytes 3 %    % Eosinophils 0 %    % Basophils 0 %    Absolute Neutrophils 0.1 (LL) 1.6 - 8.3 10e3/uL    Absolute Lymphocytes 0.4 (L) 0.8 - 5.3 10e3/uL    Absolute Monocytes 0.0 0.0 - 1.3 10e3/uL    Absolute Eosinophils 0.0 0.0 - 0.7 10e3/uL    Absolute Basophils 0.0 0.0 - 0.2 10e3/uL   Blood Bank Chart Comment   Result Value Ref Range    Blood Bank Chart Comment BB Chart Comment     SPECIMEN EXPIRATION DATE [id number]    ABO/Rh type and screen *Canceled*    Narrative    The following orders were created for panel order ABO/Rh type and screen.  Procedure                               Abnormality         Status                     ---------                               -----------         ------                       Please view results for these tests on the individual orders.   ABO/RH Type and Screen     Narrative    The following orders were created for panel order ABO/RH Type and Screen .  Procedure                               Abnormality         Status                     ---------                               -----------         ------                     Adult Type and Screen[[id number]]                           Final result                 Please view results for these tests on the individual orders.   Adult Type and Screen   Result Value Ref Range    ABO/RH(D) A POS     Antibody Screen Negative Negative    SPECIMEN EXPIRATION DATE [id number]    Lactic Acid Whole Blood w/ 1x repeat in 2 hrs when >2   Result Value Ref Range    Lactic Acid, Initial 1.0 0.7 - 2.0 mmol/L

## 2025-03-13 NOTE — PLAN OF CARE
0700 - 1500  VSS - triggered sepsis, lactic 1.0. Pt c/o headache, tylenol given x1. Had 1 emesis, zofran PRN given x1 with benefit. No SOB. Did walk outside a couple times. Rested most of shift. No concerns.

## 2025-03-13 NOTE — PLAN OF CARE
Patient, Stefan Forbes Jr. (MRN #[id number]), presented with a recorded BMI of 36.94 kg/m^2 and a documented comorbidity(s):  - Diabetes Mellitus Type 2  - Hypertension  - Hyperlipidemia  to which the severe obesity is a contributing factor. This is consistent with the definition of severe obesity (BMI 35.0-39.9) with comorbidity (ICD-10 E66.01, Z68.35). The patient's severe obesity was monitored, evaluated, addressed and/or treated. This addendum to the medical record is made on 03/28/2020.   Speech Language Therapy Discharge Summary    Reason for therapy discharge:    All goals and outcomes met, no further needs identified.    Progress towards therapy goal(s). See goals on Care Plan in [facility] electronic health record for goal details.  Goals met    Therapy recommendation(s):    Continue home exercise program.    Recommend regular diet with thin liquids, meds OK as tolerated. Pt should follow GERD precautions including: alternating solids/liquids, remaining upright for 1 hour following meals, avoiding irritant foods, and self-selecting softer foods/moistening dry foods. Pt should be fully upright and alert, taking small bites/sips, and at a slow rate. Pt reports esophageal dysphagia symptoms are resolving with strategies - recommend OP speech therapy if symptoms reoccur.

## 2025-03-13 NOTE — PROGRESS NOTES
CLINICAL NUTRITION SERVICES - REASSESSMENT NOTE     RECOMMENDATIONS FOR MDs/PROVIDERS TO ORDER:  None at present.    Malnutrition Status:    Patient does not meet two of the established criteria necessary for diagnosing malnutrition    Registered Dietitian Interventions:  None currently    Future/Additional Recommendations:  Monitor oral intake.     SUBJECTIVE INFORMATION  Assessed patient in room.    CURRENT NUTRITION ORDERS  Diet: Regular    CURRENT INTAKE/TOLERANCE  Pt consuming 0 or 100% of meals per flowsheet. Pt ordering 1-2 meals a day per [facility].     NEW FINDINGS  Pt reports that she continues to have good appetite. She felt like she was eating too much, so she cut back a bit. She was eating 3 times a day here and would only eat 1-2 times a day at home. Writer discussed increased nutrition needs during hospitalization. Pt has been eating ok despite ongoing nausea.    Weight: last wt (3/11): 79.7 kg. Wt stable during admission. 1.5% wt loss in 3 months (down from 81.2 kg on 12/9)  Skin/wounds: no findings  GI symptoms: LBM today, emesis today  Nutrition-relevant labs: Reviewed  Nutrition-relevant medications:  vitamin D3 25 mcg    MALNUTRITION  % Intake: Decreased intake does not meet criteria  % Weight Loss: Weight loss does not meet criteria   Subcutaneous Fat Loss: None observed  Muscle Loss: None observed  Fluid Accumulation/Edema: None noted  Malnutrition Diagnosis: Patient does not meet two of the established criteria necessary for diagnosing malnutrition  Malnutrition Present on Admission: No    EVALUATION OF THE PROGRESS TOWARD GOALS   Previous Goals  Patient to consume % of nutritionally adequate meal trays TID, or the equivalent with supplements/snacks. - progressing  Weight maintenance >80 kg - not met    Previous Nutrition Diagnosis  Predicted inadequate nutrient intake  related to anticipated prolonged hospitalization as evidenced by hypermetabolic disease state, expected LOS of >20  "days, good appetite/intakes currently      Evaluation: No change    NUTRITION DIAGNOSIS  Predicted inadequate nutrient intake  related to anticipated prolonged hospitalization as evidenced by hypermetabolic disease state, expected LOS of >20 days, good appetite/intakes currently    INTERVENTIONS  None currently    Goals  Patient to consume % of nutritionally adequate meal trays TID, or the equivalent with supplements/snacks.  Weight maintenance >80 kg     Monitoring/Evaluation      Progress toward goals will be monitored and evaluated per policy.    Maico Crowe, RD, LD  Available on [employer]  Weekend/Holiday BHUMIKA Garcia - \"Weekend Clinical Dietitian\"  No longer available by paging   "

## 2025-03-13 NOTE — PLAN OF CARE
Goal Outcome Evaluation: [id number]      Plan of Care Reviewed With: patient    Overall Patient Progress: no change    Pt A&O x 4, VSS on RA except intermittent HTN, and denies pain overnight. Chemotherapy course completed tonight, pt expressed some hopefulness around this. PICC hep locked in both lumens, Coban wrapped over it as pt is often going outside. Pt up ad vanda. Voiding without difficulty, had a BM tonight. While chemo was still running tonight, pt reported nausea and requested PO antiemetic. Gave Compazine 5 mg PO; pt declined further intervention, ended up having a small unmeasured emesis. She endorsed that after this emesis, she had no further nausea. Continue with POC.

## 2025-03-13 NOTE — PLAN OF CARE
Goal Outcome Evaluation:      Plan of Care Reviewed With: patient    Overall Patient Progress: no change    Outcome Evaluation: Pt is in good spirits. Shoulder pain managed with PRN Tylenol. Ambulating in halls frequently. CIVI chemo to be finished tonight.    Status: Confirmed AML, on D8 from 7+3 induction.  Neuro: A&Ox4. Able to make needs known.   Vitals: VSS.  Respiratory: On RA. Denies SOB.   Labs: Triggered sepsis this shift. Lactic acid draw, 0.9.   GI: Last BM 3/11. BS+. Regular diet, fair appetite. Denies n/v.   : Voiding spont - adequate UO, pt saving urine.   Skin: Bruising on abdomen.   IV: PICC - purple - heparin locked, red- infusing CIVI chemo  Pain: C/o shoulder pain rated 2/10, given PRN Tylenol x1 which helped with the pain per pt.   Activity: UAL. Ambulates halls frequently.   Social: PRN Atarax given x1 for anxiety. Otherwise, pt is in good spirits and counting down hours until CIVI chemo is done.   Plan: Continue with POC and notify provider of any changes.

## 2025-03-14 LAB
ALBUMIN SERPL BCG-MCNC: 3.6 G/DL (ref 3.5–5.2)
ALP SERPL-CCNC: 123 U/L (ref 40–150)
ALT SERPL W P-5'-P-CCNC: 9 U/L (ref 0–50)
ANION GAP SERPL CALCULATED.3IONS-SCNC: 10 MMOL/L (ref 7–15)
APTT PPP: 45 SECONDS (ref 22–38)
AST SERPL W P-5'-P-CCNC: 13 U/L (ref 0–45)
BASOPHILS # BLD MANUAL: 0 10E3/UL (ref 0–0.2)
BASOPHILS NFR BLD MANUAL: 0 %
BILIRUB SERPL-MCNC: 0.8 MG/DL
BLD PROD TYP BPU: NORMAL
BLD PROD TYP BPU: NORMAL
BLOOD COMPONENT TYPE: NORMAL
BLOOD COMPONENT TYPE: NORMAL
BUN SERPL-MCNC: 13.8 MG/DL (ref 6–20)
CALCIUM SERPL-MCNC: 8.9 MG/DL (ref 8.8–10.4)
CHLORIDE SERPL-SCNC: 102 MMOL/L (ref 98–107)
CODING SYSTEM: NORMAL
CODING SYSTEM: NORMAL
CREAT SERPL-MCNC: 0.68 MG/DL (ref 0.51–0.95)
CROSSMATCH: NORMAL
CROSSMATCH: NORMAL
EGFRCR SERPLBLD CKD-EPI 2021: >90 ML/MIN/1.73M2
EOSINOPHIL # BLD MANUAL: 0 10E3/UL (ref 0–0.7)
EOSINOPHIL NFR BLD MANUAL: 0 %
ERYTHROCYTE [DISTWIDTH] IN BLOOD BY AUTOMATED COUNT: 16.7 % (ref 10–15)
FIBRINOGEN PPP-MCNC: 476 MG/DL (ref 170–510)
GLUCOSE SERPL-MCNC: 121 MG/DL (ref 70–99)
HCO3 SERPL-SCNC: 23 MMOL/L (ref 22–29)
HCT VFR BLD AUTO: 17.2 % (ref 35–47)
HGB BLD-MCNC: 6.1 G/DL (ref 11.7–15.7)
HGB BLD-MCNC: 7 G/DL (ref 11.7–15.7)
INR PPP: 1.1 (ref 0.85–1.15)
ISSUE DATE AND TIME: NORMAL
ISSUE DATE AND TIME: NORMAL
LACTATE SERPL-SCNC: 0.9 MMOL/L (ref 0.7–2)
LACTATE SERPL-SCNC: 0.9 MMOL/L (ref 0.7–2)
LDH SERPL L TO P-CCNC: 253 U/L (ref 0–250)
LYMPHOCYTES # BLD MANUAL: 0.5 10E3/UL (ref 0.8–5.3)
LYMPHOCYTES NFR BLD MANUAL: 85 %
MAGNESIUM SERPL-MCNC: 2.2 MG/DL (ref 1.7–2.3)
MCH RBC QN AUTO: 36.3 PG (ref 26.5–33)
MCHC RBC AUTO-ENTMCNC: 35.5 G/DL (ref 31.5–36.5)
MCV RBC AUTO: 102 FL (ref 78–100)
MONOCYTES # BLD MANUAL: 0 10E3/UL (ref 0–1.3)
MONOCYTES NFR BLD MANUAL: 5 %
NEUTROPHILS # BLD MANUAL: 0.1 10E3/UL (ref 1.6–8.3)
NEUTROPHILS NFR BLD MANUAL: 10 %
PHOSPHATE SERPL-MCNC: 4.1 MG/DL (ref 2.5–4.5)
PLAT MORPH BLD: NORMAL
PLATELET # BLD AUTO: 42 10E3/UL (ref 150–450)
POTASSIUM SERPL-SCNC: 4.3 MMOL/L (ref 3.4–5.3)
PROT SERPL-MCNC: 6.2 G/DL (ref 6.4–8.3)
RBC # BLD AUTO: 1.68 10E6/UL (ref 3.8–5.2)
RBC MORPH BLD: NORMAL
SODIUM SERPL-SCNC: 135 MMOL/L (ref 135–145)
UNIT ABO/RH: NORMAL
UNIT ABO/RH: NORMAL
UNIT NUMBER: NORMAL
UNIT NUMBER: NORMAL
UNIT STATUS: NORMAL
UNIT STATUS: NORMAL
UNIT TYPE ISBT: 6200
UNIT TYPE ISBT: 6200
URATE SERPL-MCNC: 3.5 MG/DL (ref 2.4–5.7)
WBC # BLD AUTO: 0.5 10E3/UL (ref 4–11)

## 2025-03-14 PROCEDURE — 250N000013 HC RX MED GY IP 250 OP 250 PS 637

## 2025-03-14 PROCEDURE — 99233 SBSQ HOSP IP/OBS HIGH 50: CPT | Mod: FS

## 2025-03-14 PROCEDURE — 84100 ASSAY OF PHOSPHORUS: CPT | Performed by: PHYSICIAN ASSISTANT

## 2025-03-14 PROCEDURE — 85730 THROMBOPLASTIN TIME PARTIAL: CPT | Performed by: PHYSICIAN ASSISTANT

## 2025-03-14 PROCEDURE — 82374 ASSAY BLOOD CARBON DIOXIDE: CPT

## 2025-03-14 PROCEDURE — 85384 FIBRINOGEN ACTIVITY: CPT | Performed by: PHYSICIAN ASSISTANT

## 2025-03-14 PROCEDURE — 82040 ASSAY OF SERUM ALBUMIN: CPT

## 2025-03-14 PROCEDURE — 85018 HEMOGLOBIN: CPT

## 2025-03-14 PROCEDURE — 85610 PROTHROMBIN TIME: CPT | Performed by: PHYSICIAN ASSISTANT

## 2025-03-14 PROCEDURE — P9016 RBC LEUKOCYTES REDUCED: HCPCS

## 2025-03-14 PROCEDURE — 85041 AUTOMATED RBC COUNT: CPT

## 2025-03-14 PROCEDURE — 84550 ASSAY OF BLOOD/URIC ACID: CPT | Performed by: PHYSICIAN ASSISTANT

## 2025-03-14 PROCEDURE — 85007 BL SMEAR W/DIFF WBC COUNT: CPT

## 2025-03-14 PROCEDURE — 83605 ASSAY OF LACTIC ACID: CPT | Performed by: STUDENT IN AN ORGANIZED HEALTH CARE EDUCATION/TRAINING PROGRAM

## 2025-03-14 PROCEDURE — 83735 ASSAY OF MAGNESIUM: CPT

## 2025-03-14 PROCEDURE — 83615 LACTATE (LD) (LDH) ENZYME: CPT

## 2025-03-14 PROCEDURE — 120N000002 HC R&B MED SURG/OB UMMC

## 2025-03-14 PROCEDURE — 250N000011 HC RX IP 250 OP 636

## 2025-03-14 RX ORDER — PANTOPRAZOLE SODIUM 40 MG/1
40 TABLET, DELAYED RELEASE ORAL
Status: DISCONTINUED | OUTPATIENT
Start: 2025-03-14 | End: 2025-03-19

## 2025-03-14 RX ADMIN — HYDROXYZINE HYDROCHLORIDE 25 MG: 25 TABLET, FILM COATED ORAL at 23:46

## 2025-03-14 RX ADMIN — PAROXETINE HYDROCHLORIDE 20 MG: 20 TABLET, FILM COATED ORAL at 09:23

## 2025-03-14 RX ADMIN — Medication 5 ML: at 21:51

## 2025-03-14 RX ADMIN — VORICONAZOLE 200 MG: 200 TABLET, FILM COATED ORAL at 01:30

## 2025-03-14 RX ADMIN — ACYCLOVIR 400 MG: 400 TABLET ORAL at 09:23

## 2025-03-14 RX ADMIN — FAMOTIDINE 20 MG: 20 TABLET, FILM COATED ORAL at 09:23

## 2025-03-14 RX ADMIN — HEPARIN, PORCINE (PF) 10 UNIT/ML INTRAVENOUS SYRINGE 10 ML: at 15:24

## 2025-03-14 RX ADMIN — ACETAMINOPHEN 650 MG: 325 TABLET, FILM COATED ORAL at 15:24

## 2025-03-14 RX ADMIN — FLUTICASONE FUROATE AND VILANTEROL TRIFENATATE 1 PUFF: 100; 25 POWDER RESPIRATORY (INHALATION) at 09:30

## 2025-03-14 RX ADMIN — HYDROXYZINE HYDROCHLORIDE 25 MG: 25 TABLET, FILM COATED ORAL at 17:22

## 2025-03-14 RX ADMIN — HEPARIN, PORCINE (PF) 10 UNIT/ML INTRAVENOUS SYRINGE 5 ML: at 04:50

## 2025-03-14 RX ADMIN — ACYCLOVIR 400 MG: 400 TABLET ORAL at 21:39

## 2025-03-14 RX ADMIN — ACETAMINOPHEN 650 MG: 325 TABLET, FILM COATED ORAL at 21:39

## 2025-03-14 RX ADMIN — HYDROXYZINE HYDROCHLORIDE 25 MG: 25 TABLET, FILM COATED ORAL at 04:48

## 2025-03-14 RX ADMIN — FAMOTIDINE 20 MG: 20 TABLET, FILM COATED ORAL at 21:39

## 2025-03-14 RX ADMIN — Medication 1000 UNITS: at 09:23

## 2025-03-14 RX ADMIN — PANTOPRAZOLE SODIUM 40 MG: 40 TABLET, DELAYED RELEASE ORAL at 09:23

## 2025-03-14 RX ADMIN — VORICONAZOLE 200 MG: 200 TABLET, FILM COATED ORAL at 21:39

## 2025-03-14 RX ADMIN — LEVOFLOXACIN 500 MG: 500 TABLET, FILM COATED ORAL at 09:23

## 2025-03-14 RX ADMIN — HYDROXYZINE HYDROCHLORIDE 25 MG: 25 TABLET, FILM COATED ORAL at 11:11

## 2025-03-14 RX ADMIN — ONDANSETRON HYDROCHLORIDE 4 MG: 4 TABLET, FILM COATED ORAL at 15:22

## 2025-03-14 RX ADMIN — VORICONAZOLE 200 MG: 200 TABLET, FILM COATED ORAL at 09:23

## 2025-03-14 RX ADMIN — LORATADINE 10 MG: 10 TABLET ORAL at 09:23

## 2025-03-14 ASSESSMENT — ACTIVITIES OF DAILY LIVING (ADL)
ADLS_ACUITY_SCORE: 39
ADLS_ACUITY_SCORE: 39
ADLS_ACUITY_SCORE: 35
ADLS_ACUITY_SCORE: 35
ADLS_ACUITY_SCORE: 39
ADLS_ACUITY_SCORE: 39
ADLS_ACUITY_SCORE: 35
ADLS_ACUITY_SCORE: 39
ADLS_ACUITY_SCORE: 35
ADLS_ACUITY_SCORE: 39
ADLS_ACUITY_SCORE: 39
ADLS_ACUITY_SCORE: 35
ADLS_ACUITY_SCORE: 39
ADLS_ACUITY_SCORE: 35
ADLS_ACUITY_SCORE: 39
ADLS_ACUITY_SCORE: 39

## 2025-03-14 NOTE — PLAN OF CARE
Goal Outcome Evaluation:      Plan of Care Reviewed With: patient    Overall Patient Progress: no changeOverall Patient Progress: no change           Activity: Independent  Neuro: Intact, AnO x4 afebrile, anxious this AM, 25mg PO hydroxyzine  Cardiac: WDL   Respiratory: WDL on RA  GI/: Voiding spontaneously, last BM 3/13, passing gas, BS+   Diet: Regular  Skin: Generalized abd brusing  Lines/Drains: Double lumen PICC hep locked  Pain/Nausea: Denies

## 2025-03-14 NOTE — PLAN OF CARE
[id number]    D10 of 7+3. Tmax 99.5, OVSS, afebrile and on RA. A&Ox4. Intermittent nausea reported, gave zofran x1 prior to eating. Pt reported heartburn has improved. Initially denied pain, however had headache this afternoon that was managed with tylenol x1 with effectiveness. No SOB reported. Voiding with adequate UOP, LBM 3/13. Received 1 unit of RBC for Hgb 6.1, recheck 1 hr post infusion was 7.0 - pt received another unit of RBC. Tolerated both transfusions well. Up independently in room, going for walks frequently. Continue w/ POC.     Goal Outcome Evaluation:      Plan of Care Reviewed With: patient    Overall Patient Progress: no changeOverall Patient Progress: no change    Outcome Evaluation: Heartburn improving today, Recieved RBC x2, tolerated well

## 2025-03-14 NOTE — PLAN OF CARE
"Goal Outcome Evaluation:      Plan of Care Reviewed With: patient    Overall Patient Progress: no change    Outcome Evaluation: Pt c/o heartburn, finished only half of her dinner due to pain from heartburn. PRN Tums given x1 and PRN Atarax x1. Pt is intermittently nauseas, given PRN Compazine x1      Status: Confirmed AML, on D9 from 7+3 induction. Finished CIVI chemo this AM.  Neuro: A&Ox4. Able to make needs known.   Vitals: VSS.  Respiratory: On RA. Denies SOB.   Labs: Neutropenic ANC - 0.1  GI: Last BM 3/13. Pt reports stool incontinence, stated that she \"thinks it is a fart, but it actually stool\". BS+. Pt reported heartburn this shift and states that it \"feels like something is stuck\" at the bottom of her esophagus, PRN Tums given x1. PRN Atarax given x1. Intermittent nausea, PRN Compazine given x1.   : Voiding spont - adequate UO, pt saving urine.   Skin: Bruising on abdomen.   IV: PICC, double lumen- heparin locked  Pain: C/o throat pain when eating. Denied pain medication when offered. C/o headache at end of shift, PRN Tylenol given x1.   Activity: UAL. Ambulates outside frequently.   Social: Pt has a history of smoking and goes outside frequently to smoke.   Plan: Continue with POC and notify provider of any changes. Plan for BMBx 3/19.  "

## 2025-03-14 NOTE — PROVIDER NOTIFICATION
"Paged provider, Scott Sánchez, via [employer] @ [phone]:    \"FYI, pt has krysten c/o heartburn that she describes as an ache at the end of her esophagus. She says \"it feels like there is something there\". PRN Tums given x1 and alsp Atarax x1. Pt also said that it hurt when she strated to eat, so she did not finish all of her dinner. Thanks.\"    Provider responded and advised to give the pt more tums for now.   "

## 2025-03-14 NOTE — PROGRESS NOTES
[facility]    Hematology / Oncology Progress Note  Hospital Day # 16  Date of Service (when I saw the patient): 03/14/2025     Assessment & Plan   Alejandra Villegas is a 59 year old female with a past medical history significant for COPD, CKD, migraines, rheumatoid arthritis, aortic stenosis, Afib, CHF, and anxiety who presented to an outside hospital with cytopenias and was found on BMBx to have hypercellular marrow with 4% blasts, 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 She was subsequently admitted to [facility] on 2/26/25 for further work-up and management and pathology re-review was most consistent with AML with NPM1 mutation by WHO 2022 (which does not require a specific blast threshold). Following further multidisciplinary discussion, she started intensive induction with 7+3 (D1=3/5/25).  Her course has been complicated by neutropenic fevers, influenza A, AOM, and L TM perforation.     Today:  - D10 from 7+3 induction. Midcycle marrow scheduled 3/19 @ 1100, orders to be placed.   - Febrile 100.4 overnight. No localizing/new symptoms. OVSS. Given ~12 hours since last documented fever and has remained afebrile, will defer further workup/initiation of abx.   - If refevers, then would obtain ID workup w/ BCx2, UA, UC, CXR, and broaden abx to cefepime.   - Ongoing GERD symptoms w/ epigastric pain, dull pain localized to epigastric region, worsened w/ N/V and anxiety. Rates 2-3/10, no TTP or reproducible symptoms. Denies melena, dark tarry stools, hematemesis w/ emetic episodes x3/13, or other sx c/f UGI. Follow Hgb recheck. Add daily PPI to regimen. If persistent/worsening then could consider GI consult.   - Hgb 6.1, transfuse 1 unit pRBCs. Follow recheck.   - Continue with best supportive cares.     HEME  # AML with NPM1 mutation  Had been seen by PCP Dec 2024 w/ progressive fatigue and nausea where she was found leukopenic WBC  2.4 and  neutropenic w/ ANC 0.3. Hgb 12. Was referred to local hematology/oncology clinic for further evaluation and seen by Dr. Harris. BMBx 2/10/25 done at OSH showed hypercellular marrow w/ trilineage hematopoiesis w/ dyspoiesis with mildly elevated blasts of 4% on morphology. NGS w/ NPM1, IDH2, and NRAS mutations. She was admitted to [facility] 2/26/25 for further workup and management. Slides were re-reviewed by [facility] Hematopathology, who noted hypercellular marrow with 8% blasts by morphology. Despite relatively low blast percentage, findings were felt most consistent with a diagnosis AML with NPM1 mutation by WHO 2022 (which does not require a specific blast threshold). Following interdepartmental discussions and review of the available literature, patient was started on intensive induction with 7+3 (Day 1=3/5/25).   - PICC placed 3/5/2025, plan to discontinue on discharge.   - Baseline cardiopulmonary studies:  - Echo w/ EF 60-65%, mild known aortic stenosis.   - EKG (2/27) with NSR, QTc 412  - CXR (2/26) with mildly increased streaky bibasilar opacities, atelectasis or edema. No consolidation.   - Baseline viral serologies: CMV IgG+, EBV IgG+, HepB sAg-, HepB cAb-, HepB sAb-, HSV1+/2+, HIV-  - HLA Typing sent on admission. Message sent to notify BMT team x2/27 for IP consult.   - Midcycle marrow scheduled 3/19 @ 1100, orders to be placed closer to date.        Treatment plan: 7+3 (C1D1=3/5/2025)    - Daunorubicin 60 mg/m  IV D1-3    - Cytarabine 100 mg/m  IV D1-7      - Pre-medications: zofran, dexamethasone 12 mg D1-3     # Pancytopenia  Secondary to underlying hematologic malignancy and exacerbated by recent chemotherapy.  - Follow daily CBC with differential  - Transfuse to keep Hgb >7, plt >10K  - Blood consent obtained 2/26/25 on admission and placed in patient's paper chart.      # Risk for TLS  # Hyperphosphatemia, resolved  Secondary to hematologic malignancy, no evidence of TLS on admission.   - Allopurinol 300 mg  daily x7 days (through 3/11)  - Follow TLS labs daily    # Risk for DIC  Secondary to underlying hematologic malignancy.  - Follow coags every MWF  - Transfuse cryo to keep fibrinogen >100, FFP to keep INR <1.8     ID  # ID prophylaxis  - Acyclovir 400 mg BID  - Levaquin 500 mg daily  - Voriconazole 200 mg BID  - PLC placed on admission for posa/vori coverage; vori w/ $1.60 copay, posa requiring PA. Rotated from deny to vori x3/13 w/ completion of chemotherapy.      # Neutropenic fever, resolved  # Influenza A, resolved  3/13: Febrile 100.4 overnight x3/13. No localizing/new symptoms. OVSS. Patient was wearing heated jacket that she attributed to temperature, no documentation noted of notifying cross cover MD. No further ID workup (BCx, UA, UC, or CXR) or broadening of abx completed. Given prolonged period on AM chart check x3/14 since documented fever and patient has remained afebrile and OVSS, will defer further workup/initiation of abx.   - If refevers, then would obtain ID workup w/ BCx2, UA, UC, CXR, and broaden abx to cefepime.   2/25: On admission, patient noted subjective fever with chills and rhinitis beginning 2/25 PM prior to admission. No other localizing s/sx of infection. She was afebrile on admission but spiked a low-grade fever to 100.7 on 2/26 PM. Blood and urine cultures negative, CXR with streaky opacities but no burt consolidation. Work-up positive for influenza A, and she completed a course of Tamiflu 75 mg BID x5 days (2/26-3/3). She also received a short empiric course of IV cefepime (2/26-3/1) given concurrent neutropenia.  - Monitor for recurrent fevers    # R AOM with purulent effusion, resolved  # Small L TM perforation, resolved  Patient noted B/L ear pain, L>R s/p drainage from R side on 3/3. Noted h/o recurrent AOMs. On exam, TMs appear full with erythema to outer edges, patient noted pain w/ exam though able to tolerate. Small TM perforation noted to L side. Query if perforation from  recurrent AOM or 2/2 congestion and fluid from recent influenza.  - ENT consulted, appreciate recs:  - Recommended 7-day course Augmentin (per cross-cover discussion with pharmacy, given h/o allergic reaction to Augmentin, increased dose of levofloxacin from 500 mg ? 750 mg daily (3/5-3/11) then plan to deescalate to ppx levaquin 500 mg daily.   - 5 day course of floxin drops to left ear for perforation (3/3-3/7)  - Follow up w/ PCP for left TM perforation - if persistent in 6-8 weeks recommend outpt ENT referral.     GI/  # GERD  # Epigastric pain  Reported recent increase in symptoms in the days leading up to admission and initiated famotidine.   - 3/9: Patient reported onset of midsternal chest pain that felt like reflux symptoms shortly after eating lunch.  EKG NSR.  States on 3/8, she experienced episode of emesis shortly after eating without preceding symptoms, no nausea at the time.  States this has happened before on occasion and has a history of this happening in the past.  No radiation of pain.  She also wondered if this may be related to the nicotine patch which she removed as she was also experiencing tachycardia, though had also been drinking energy drinks which she is trying to cut back on.  Pain is not reproducible on exam.  Also feels anxiety is attributing to some of her symptoms.  - 3/14: Ongoing GERD symptoms w/ epigastric pain, dull pain localized to epigastric region, worsened w/ N/V and anxiety. Rates 2-3/10, no TTP or reproducible symptoms. Denies melena, dark tarry stools, hematemesis w/ emetic episodes x3/13, or other sx c/f UGI. Of note, AM Hgb 6.1, will transfuse 1 unit pRBCs and follow for recheck. Add daily PPI to regimen. If persistent/worsening then could consider GI consult.   - Increase famotidine 10 mg BID ? 20 mg BID x3/9.   - Protonix 40 mg daily x3/14   - TUMS prn  - SLP consulted 3/10 - Ok for regular diet w/ thin liquids    # Nausea/vomiting, recurrent  Patient noted ongoing  "nausea w/ occasional vomiting starting Dec 2024. Has been managed with PRN Zofran.  Endorsed nausea on admission, now resolved.   - PRN Zofran and compazine  - Could consider trial of PRN Zyprexa if 3rd agent is desired/required     # Urinary frequency, resolved  # Dysuria, resolved  On admission, patient noted longstanding history of urinary frequency though new mild dysuria. Denies hematuria, bladder tenderness of CVA tenderness. UA (2/26) negative, UC with no growth. Symptoms have since resolved.     PULM  # COPD  Longstanding history of COPD. On admission patient reports symptoms are manageable and no recent exacerbations. Most recent PFTs (2018) were normal.  - Continue PTA Breo Ellipta inhaler and PRN DuoNebs      CV  #Non-radiating chest pain, resolved  Patient noted new non-radiating mid-sternal chest pain with associated reflux-like symptoms with anxiety x3/9 after eating lunch. EKG showed NSR. Noted x3/8, experiencing episode of emesis shortly after eating without preceding symptoms, no nausea at the time, and similar mid sternal discomfort that felt like her \"esophagus\".  States this has happened before on occasion and has a history of this happening in the past.  She also wondered if this may be related to the nicotine patch which she removed as she was also experiencing tachycardia, though had also been drinking energy drinks which she is trying to cut back on.  Pain not reproducible on exam.  Also feels anxiety is attributing to some of her symptoms as detailed below.     # Essential hypertension  - Continue PTA lisinopril - HELD 3/11 w/ soft BPs (90s-100s/50s-60s), resume pending daily trends     # Mixed hyperlipidemia  Lipid panel has remained at goal, 1/14/25 panel w/ cholesterol 163, , LDL 92, HDL 45.   - Held PTA atorvastatin on admission due to chemotherapy interactions; will also likely have DDI with azole once started. Consider rotation to rosuvastatin for better drug-drug interaction " profile.     # H/o aortic stenosis  Patient noted on admission longstanding history of aortic stenosis. Pre-chemo echo w/ EF 60-65% and mild aortic stenosis and insufficiency. No previous echo to compare.      RENAL/FEN  # Stage 2 CKD  Baseline Cr ~ 0.7. On admission Cr 0.77.  - Continue to trend on daily labs and encourage PO hydration     # Mild hyponatremia, resolved  New mild hyponatremia noted 3/13 with Na 135 ? 130. Asymptomatic. Trial 1 L NS. If persistent/wrosening then could consider initiation of salt tabs, hypertonic saline, and would obtain further workup with urine lytes.     NEURO  # Degenerative disc disease, L5-S1  - Continue PTA gabapentin     # Chronic migraine w/o aura  Present since childhood. Reportedly triggered by neck manipulation/movement. Reportedly well-managed with regimen below.  - Continue PTA sumatriptan and cyclobenzaprine PRN     ENDO  # Prediabetes  Last A1c 6.0 x12/9/24. Has been managing with lifestyle modifications.      MISC  # Rheumatoid arthritis   Patient reported trying treatment though was unable to tolerate well. Managed with Tylenol PRN. Most recnet RF >650 (2/10/25). JI negative.       # BROOKS  # MDD  # At risk for adjustment disorder  Patient reports good control of anxiety/depressive symptoms prior to admission. Did note feeling overwhelmed by new diagnosis. On admission discussed inpatient services such as health psychology or cordelia, she respectfully declined though will consider. Ongoing discussion regarding symptoms of anxiety and depression continued throughout admission; patient endorses continuous chemo limiting accessibility to previously frequent trips outside has affected symptoms, feeling hopeful w/ cytarabine to complete tomorrow PM. Discussed increase in dose of Paxil and further d/w health psych; she declined at this time. C/w prn atarax and Paxil.   - Readdress health psych consult as indicated  - Continue PTA paroxetine   - Atarax as needed     #  Vitamin D deficiency  Continue PTA vit D supplement    # Seasonal allergies - Claritin 10 mg daily     # Tobacco use   Has smoked since age 16, 1.5 ppd (roughly 65 pack years). Attempting to cut back as recently as 01/2025 to 0.5 ppd. We discussed the risks of smoking during induction chemotherapy including increased risk for infection in setting of severe neutropenia that could further complicate course, even leading to death. She expressed understanding and appreciation of conversation.   - Encourage smoking cessation, readdress NRT as needed.   - Nicotine patch ordered 2/28. Patient had been intermittently declining patch and reported associated increased symptoms of anxiety, and felt like patch precipitated anxiety attack w/ chest pain on 3/9. Discontinued 3/11, offered trial of lower dose patch or alternative NRT that patient declined.    - Discussed option of smoking cessation team, patient declined at this time.    Fluids/Electrolytes/Nutrition   - IVF prn   - PRN lyte replacement per standing protocol  - Regular diet as tolerated     Lines: PICC    PPX  VTE: TCP, Lovenox held 3/13  GI: pepcid  Bowels: prn senna and miralax  Activity: as tolerated    Code: DNR/DNI    Medically Ready for Discharge: Anticipated in 5+ Days    Disposition: Admitted for further workup and management. Discharge pending treatment decisions and clinical course.   Follow-up: Will need to request APPT18 closer to discharge date and determine primary oncologist.     60 MINUTES SPENT BY ME on the date of service doing chart review, history, exam, documentation & further activities per the note.      Staffed with Dr. Moreno.    Chelsie Murphy PA-C  Hematology/Oncology  Pager #[phone]    Interval History   No acute overnight events. Nursing notes reviewed.      Alejandra is sitting up in bed, reports feeling about the same. Ongoing GERD symptoms w/ epigastric pain, dull pain localized to epigastric region, worsened w/ N/V and  "anxiety. Rates 2-3/10, no TTP or reproducible symptoms. Denies dark tarry stools, hematemesis w/ emetic episodes x3/13, or other sx c/f UGI. Agreeable to daily PPI. N/V yesterday though manageable today, encouraged use of prn regimen. Reviewed overnight fever, she denies localizing/new associated symptoms, reviewed plan if she were to fever again. Denies mouth sores, SOB, cough, abdominal pain, diarrhea, constipation, nausea, vomiting, hematemesis, melena, dysuria, hematuria, numbness, tingling, swelling. Denies further questions or concerns at this time.     A comprehensive ROS was performed and was negative except as detailed in the HPI above.     Physical Exam   Temp: 98.7  F (37.1  C) Temp src: Oral BP: 94/63 Pulse: 106   Resp: 18 SpO2: 95 % O2 Device: None (Room air)    Vitals:    03/11/25 1010 03/13/25 1200 03/14/25 1012   Weight: 79.7 kg (175 lb 11.2 oz) 77.7 kg (171 lb 6.4 oz) 77.7 kg (171 lb 4.8 oz)     Vital Signs with Ranges  Temp:  [98.1  F (36.7  C)-100.4  F (38  C)] 98.7  F (37.1  C)  Pulse:  [] 106  Resp:  [16-20] 18  BP: ()/(55-74) 94/63  SpO2:  [95 %-99 %] 95 %  I/O last 3 completed shifts:  In: 621 [P.O.:90; I.V.:530; IV Piggyback:1]  Out: 1600 [Urine:1600]      Physical Exam:    Blood pressure 94/63, pulse 106, temperature 98.7  F (37.1  C), temperature source Oral, resp. rate 18, height 1.626 m (5' 4\"), weight 77.7 kg (171 lb 4.8 oz), last menstrual period 01/01/2007, SpO2 95%, not currently breastfeeding.    Constitutional: Pleasant and cooperative female, in NAD. Alert, interactive, non-toxic. Smiling and conversational.  HEENT: NC/AT, sclera clear, conjunctiva normal, OP with MMM, no discrete intraoral lesions or thrush, poor dentition  Respiratory: No increased work of breathing, CTAB, no crackles or wheezing.  Cardiovascular: RRR, harsh blowing systolic murmur (I-II/VI) heard at the LUSB. No peripheral edema. No calf tenderness or palpable cords.  GI: Normal bowel sounds. Abdomen " is soft, non-distended and non-tender to palpation.  Skin: Warm, dry, well-perfused. No bruising, bleeding, rashes, or lesions on limited exam.  Musculoskeletal: Diminished muscle bulk, no gross deformities.  Neurologic: A&O. Answers questions appropriately, speech normal. Moves all extremities spontaneously.  Psychiatric: Normal mood and affect.  Vascular access:  PICC on RUE CDI, non-tender, no surrounding erythema.    Medications   Current Facility-Administered Medications   Medication Dose Route Frequency Provider Last Rate Last Admin     Current Facility-Administered Medications   Medication Dose Route Frequency Provider Last Rate Last Admin    acyclovir (ZOVIRAX) tablet 400 mg  400 mg Oral BID Chelsie Murphy PA-C   400 mg at 03/14/25 0923    famotidine (PEPCID) tablet 20 mg  20 mg Oral BID Zully Garcia PA-C   20 mg at 03/14/25 0923    fluticasone-vilanterol (BREO ELLIPTA) 100-25 MCG/ACT inhaler 1 puff  1 puff Inhalation Daily Chelsie Murphy PA-C   1 puff at 03/14/25 0930    heparin lock flush 10 unit/mL injection 5-20 mL  5-20 mL Intracatheter Q24H Chelsie Murphy PA-C   5 mL at 03/13/25 0644    heparin lock flush 10 unit/mL injection 5-20 mL  5-20 mL Intracatheter Q24H Chelsie Murphy PA-C   5 mL at 03/11/25 2128    levofloxacin (LEVAQUIN) tablet 500 mg  500 mg Oral Daily Zully Garcia PA-C   500 mg at 03/14/25 0923    [Held by provider] lisinopril (ZESTRIL) tablet 10 mg  10 mg Oral Daily Jyoti Tenorio PA-C   10 mg at 03/10/25 1039    loratadine (CLARITIN) tablet 10 mg  10 mg Oral Daily Chelsie Murphy PA-C   10 mg at 03/14/25 0923    pantoprazole (PROTONIX) EC tablet 40 mg  40 mg Oral QAM AC Chelsie Murphy PA-C   40 mg at 03/14/25 0923    PARoxetine (PAXIL) tablet 20 mg  20 mg Oral QAM Chelsie Murphy PA-C   20 mg at 03/14/25 0923    sodium chloride (PF) 0.9% PF flush 10-40 mL  10-40 mL Intracatheter Q8H  Chelsie Murphy PA-C   10 mL at 03/11/25 1456    Vitamin D3 (CHOLECALCIFEROL) tablet 1,000 Units  1,000 Units Oral Daily Chelsie Murphy PA-C   1,000 Units at 03/14/25 0923    voriconazole (VFEND) tablet 200 mg  200 mg Oral Q12H [facility] (08/20) Chelsie Murphy PA-C   200 mg at 03/14/25 0923       Data   Results for orders placed or performed during the hospital encounter of 02/26/25 (from the past 24 hours)   Lactic Acid Whole Blood w/ 1x repeat in 2 hrs when >2   Result Value Ref Range    Lactic Acid, Initial 1.0 0.7 - 2.0 mmol/L   CBC with platelets differential    Narrative    The following orders were created for panel order CBC with platelets differential.  Procedure                               Abnormality         Status                     ---------                               -----------         ------                     CBC with platelets and ...[[id number]]  Abnormal            Final result               RBC and Platelet Morpho...[[id number]]                      Final result               Manual Differential[[id number]]         Abnormal            Final result                 Please view results for these tests on the individual orders.   Comprehensive metabolic panel   Result Value Ref Range    Sodium 135 135 - 145 mmol/L    Potassium 4.3 3.4 - 5.3 mmol/L    Carbon Dioxide (CO2) 23 22 - 29 mmol/L    Anion Gap 10 7 - 15 mmol/L    Urea Nitrogen 13.8 6.0 - 20.0 mg/dL    Creatinine 0.68 0.51 - 0.95 mg/dL    GFR Estimate >90 >60 mL/min/1.73m2    Calcium 8.9 8.8 - 10.4 mg/dL    Chloride 102 98 - 107 mmol/L    Glucose 121 (H) 70 - 99 mg/dL    Alkaline Phosphatase 123 40 - 150 U/L    AST 13 0 - 45 U/L    ALT 9 0 - 50 U/L    Protein Total 6.2 (L) 6.4 - 8.3 g/dL    Albumin 3.6 3.5 - 5.2 g/dL    Bilirubin Total 0.8 <=1.2 mg/dL   Magnesium   Result Value Ref Range    Magnesium 2.2 1.7 - 2.3 mg/dL   Lactate Dehydrogenase (aka LDH)   Result Value Ref Range    Lactate Dehydrogenase 253  (H) 0 - 250 U/L   Fibrinogen activity   Result Value Ref Range    Fibrinogen Activity 476 170 - 510 mg/dL   INR   Result Value Ref Range    INR 1.10 0.85 - 1.15   Partial thromboplastin time   Result Value Ref Range    aPTT 45 (H) 22 - 38 Seconds   Phosphorus   Result Value Ref Range    Phosphorus 4.1 2.5 - 4.5 mg/dL   Uric acid   Result Value Ref Range    Uric Acid 3.5 2.4 - 5.7 mg/dL   Lactic Acid Whole Blood w/ 1x repeat in 2 hrs when >2   Result Value Ref Range    Lactic Acid, Initial 0.9 0.7 - 2.0 mmol/L   CBC with platelets and differential   Result Value Ref Range    WBC Count 0.5 (LL) 4.0 - 11.0 10e3/uL    RBC Count 1.68 (L) 3.80 - 5.20 10e6/uL    Hemoglobin 6.1 (LL) 11.7 - 15.7 g/dL    Hematocrit 17.2 (L) 35.0 - 47.0 %     (H) 78 - 100 fL    MCH 36.3 (H) 26.5 - 33.0 pg    MCHC 35.5 31.5 - 36.5 g/dL    RDW 16.7 (H) 10.0 - 15.0 %    Platelet Count 42 (LL) 150 - 450 10e3/uL   RBC and Platelet Morphology   Result Value Ref Range    RBC Morphology Confirmed RBC Indices     Platelet Assessment  Automated Count Confirmed. Platelet morphology is normal.     Automated Count Confirmed. Platelet morphology is normal.   Manual Differential   Result Value Ref Range    % Neutrophils 10 %    % Lymphocytes 85 %    % Monocytes 5 %    % Eosinophils 0 %    % Basophils 0 %    Absolute Neutrophils 0.1 (LL) 1.6 - 8.3 10e3/uL    Absolute Lymphocytes 0.5 (L) 0.8 - 5.3 10e3/uL    Absolute Monocytes 0.0 0.0 - 1.3 10e3/uL    Absolute Eosinophils 0.0 0.0 - 0.7 10e3/uL    Absolute Basophils 0.0 0.0 - 0.2 10e3/uL   CONDITIONAL Prepare red blood cells (unit)   Result Value Ref Range    Blood Component Type Red Blood Cells     Product Code [id number]     Unit Status Ready for issue     Unit Number [id number]     CROSSMATCH Compatible     CODING SYSTEM [id number]

## 2025-03-15 LAB
ALBUMIN SERPL BCG-MCNC: 3.5 G/DL (ref 3.5–5.2)
ALP SERPL-CCNC: 112 U/L (ref 40–150)
ALT SERPL W P-5'-P-CCNC: 9 U/L (ref 0–50)
ANION GAP SERPL CALCULATED.3IONS-SCNC: 15 MMOL/L (ref 7–15)
AST SERPL W P-5'-P-CCNC: 13 U/L (ref 0–45)
BASOPHILS # BLD MANUAL: 0 10E3/UL (ref 0–0.2)
BASOPHILS NFR BLD MANUAL: 0 %
BILIRUB SERPL-MCNC: 1.1 MG/DL
BUN SERPL-MCNC: 14 MG/DL (ref 6–20)
CALCIUM SERPL-MCNC: 8.6 MG/DL (ref 8.8–10.4)
CHLORIDE SERPL-SCNC: 102 MMOL/L (ref 98–107)
CREAT SERPL-MCNC: 0.66 MG/DL (ref 0.51–0.95)
EGFRCR SERPLBLD CKD-EPI 2021: >90 ML/MIN/1.73M2
EOSINOPHIL # BLD MANUAL: 0 10E3/UL (ref 0–0.7)
EOSINOPHIL NFR BLD MANUAL: 0 %
ERYTHROCYTE [DISTWIDTH] IN BLOOD BY AUTOMATED COUNT: 18.1 % (ref 10–15)
GLUCOSE SERPL-MCNC: 159 MG/DL (ref 70–99)
HCO3 SERPL-SCNC: 17 MMOL/L (ref 22–29)
HCT VFR BLD AUTO: 21.9 % (ref 35–47)
HGB BLD-MCNC: 8 G/DL (ref 11.7–15.7)
LACTATE SERPL-SCNC: 0.9 MMOL/L (ref 0.7–2)
LDH SERPL L TO P-CCNC: 238 U/L (ref 0–250)
LYMPHOCYTES # BLD MANUAL: 0.3 10E3/UL (ref 0.8–5.3)
LYMPHOCYTES NFR BLD MANUAL: 67 %
MAGNESIUM SERPL-MCNC: 2 MG/DL (ref 1.7–2.3)
MCH RBC QN AUTO: 34.8 PG (ref 26.5–33)
MCHC RBC AUTO-ENTMCNC: 36.5 G/DL (ref 31.5–36.5)
MCV RBC AUTO: 95 FL (ref 78–100)
MONOCYTES # BLD MANUAL: 0 10E3/UL (ref 0–1.3)
MONOCYTES NFR BLD MANUAL: 4 %
NEUTROPHILS # BLD MANUAL: 0.1 10E3/UL (ref 1.6–8.3)
NEUTROPHILS NFR BLD MANUAL: 29 %
PHOSPHATE SERPL-MCNC: 3.5 MG/DL (ref 2.5–4.5)
PLAT MORPH BLD: NORMAL
PLATELET # BLD AUTO: 16 10E3/UL (ref 150–450)
POTASSIUM SERPL-SCNC: 3.5 MMOL/L (ref 3.4–5.3)
PROT SERPL-MCNC: 6.2 G/DL (ref 6.4–8.3)
RBC # BLD AUTO: 2.3 10E6/UL (ref 3.8–5.2)
RBC MORPH BLD: NORMAL
SODIUM SERPL-SCNC: 134 MMOL/L (ref 135–145)
URATE SERPL-MCNC: 3.5 MG/DL (ref 2.4–5.7)
WBC # BLD AUTO: 0.5 10E3/UL (ref 4–11)

## 2025-03-15 PROCEDURE — 250N000013 HC RX MED GY IP 250 OP 250 PS 637

## 2025-03-15 PROCEDURE — 83605 ASSAY OF LACTIC ACID: CPT | Performed by: STUDENT IN AN ORGANIZED HEALTH CARE EDUCATION/TRAINING PROGRAM

## 2025-03-15 PROCEDURE — 84550 ASSAY OF BLOOD/URIC ACID: CPT | Performed by: PHYSICIAN ASSISTANT

## 2025-03-15 PROCEDURE — 120N000002 HC R&B MED SURG/OB UMMC

## 2025-03-15 PROCEDURE — 83735 ASSAY OF MAGNESIUM: CPT

## 2025-03-15 PROCEDURE — 83615 LACTATE (LD) (LDH) ENZYME: CPT

## 2025-03-15 PROCEDURE — 80053 COMPREHEN METABOLIC PANEL: CPT

## 2025-03-15 PROCEDURE — 84100 ASSAY OF PHOSPHORUS: CPT | Performed by: PHYSICIAN ASSISTANT

## 2025-03-15 PROCEDURE — 85007 BL SMEAR W/DIFF WBC COUNT: CPT

## 2025-03-15 PROCEDURE — 250N000011 HC RX IP 250 OP 636

## 2025-03-15 PROCEDURE — 85014 HEMATOCRIT: CPT

## 2025-03-15 PROCEDURE — 99233 SBSQ HOSP IP/OBS HIGH 50: CPT | Mod: FS

## 2025-03-15 RX ADMIN — VORICONAZOLE 200 MG: 200 TABLET, FILM COATED ORAL at 09:08

## 2025-03-15 RX ADMIN — ACETAMINOPHEN 650 MG: 325 TABLET, FILM COATED ORAL at 06:37

## 2025-03-15 RX ADMIN — LEVOFLOXACIN 500 MG: 500 TABLET, FILM COATED ORAL at 09:07

## 2025-03-15 RX ADMIN — FAMOTIDINE 20 MG: 20 TABLET, FILM COATED ORAL at 19:46

## 2025-03-15 RX ADMIN — HYDROXYZINE HYDROCHLORIDE 25 MG: 25 TABLET, FILM COATED ORAL at 18:24

## 2025-03-15 RX ADMIN — Medication 10 ML: at 19:47

## 2025-03-15 RX ADMIN — FLUTICASONE FUROATE AND VILANTEROL TRIFENATATE 1 PUFF: 100; 25 POWDER RESPIRATORY (INHALATION) at 10:46

## 2025-03-15 RX ADMIN — ACETAMINOPHEN 650 MG: 325 TABLET, FILM COATED ORAL at 14:50

## 2025-03-15 RX ADMIN — LORATADINE 10 MG: 10 TABLET ORAL at 09:07

## 2025-03-15 RX ADMIN — ACYCLOVIR 400 MG: 400 TABLET ORAL at 09:07

## 2025-03-15 RX ADMIN — PAROXETINE HYDROCHLORIDE 20 MG: 20 TABLET, FILM COATED ORAL at 09:08

## 2025-03-15 RX ADMIN — PANTOPRAZOLE SODIUM 40 MG: 40 TABLET, DELAYED RELEASE ORAL at 09:08

## 2025-03-15 RX ADMIN — ACETAMINOPHEN 650 MG: 325 TABLET, FILM COATED ORAL at 21:34

## 2025-03-15 RX ADMIN — ONDANSETRON HYDROCHLORIDE 4 MG: 4 TABLET, FILM COATED ORAL at 13:08

## 2025-03-15 RX ADMIN — FAMOTIDINE 20 MG: 20 TABLET, FILM COATED ORAL at 09:07

## 2025-03-15 RX ADMIN — HEPARIN, PORCINE (PF) 10 UNIT/ML INTRAVENOUS SYRINGE 5 ML: at 04:43

## 2025-03-15 RX ADMIN — ACYCLOVIR 400 MG: 400 TABLET ORAL at 19:46

## 2025-03-15 RX ADMIN — Medication 1000 UNITS: at 09:08

## 2025-03-15 RX ADMIN — HEPARIN, PORCINE (PF) 10 UNIT/ML INTRAVENOUS SYRINGE 5 ML: at 14:50

## 2025-03-15 RX ADMIN — VORICONAZOLE 200 MG: 200 TABLET, FILM COATED ORAL at 19:47

## 2025-03-15 RX ADMIN — HYDROXYZINE HYDROCHLORIDE 25 MG: 25 TABLET, FILM COATED ORAL at 10:46

## 2025-03-15 ASSESSMENT — ACTIVITIES OF DAILY LIVING (ADL)
ADLS_ACUITY_SCORE: 35
ADLS_ACUITY_SCORE: 38
ADLS_ACUITY_SCORE: 35
ADLS_ACUITY_SCORE: 35
ADLS_ACUITY_SCORE: 38
ADLS_ACUITY_SCORE: 38
ADLS_ACUITY_SCORE: 35
ADLS_ACUITY_SCORE: 38
ADLS_ACUITY_SCORE: 35
ADLS_ACUITY_SCORE: 38
ADLS_ACUITY_SCORE: 38
ADLS_ACUITY_SCORE: 35
ADLS_ACUITY_SCORE: 33
ADLS_ACUITY_SCORE: 33

## 2025-03-15 NOTE — PLAN OF CARE
Goal Outcome Evaluation:      Plan of Care Reviewed With: patient    Overall Patient Progress: no changeOverall Patient Progress: no change    Outcome Evaluation: C1D10 7+3 3/14    [id number]    T max 100, AOVSS on RA, denies SOB and n/v  Pain 4/10 from headache, tylenol x2 given  PRN atarax given x1 for anxiety  Pt UAL, takes smokes breaks downstairs  Voiding adequately, passing gas, no BM this shift  PICC heparin locked, labs drawn  Plan for midcycle marrow 3/19 at 1100  No acute events overnight, continue with POC

## 2025-03-15 NOTE — PROGRESS NOTES
[facility]    Hematology / Oncology Progress Note  Hospital Day # 17  Date of Service (when I saw the patient): 03/15/2025     Assessment & Plan   Alejandra Villegas is a 59 year old female with a past medical history significant for COPD, CKD, migraines, rheumatoid arthritis, aortic stenosis, Afib, CHF, and anxiety who presented to an outside hospital with cytopenias and was found on BMBx to have hypercellular marrow with 4% blasts, 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 She was subsequently admitted to [facility] on 2/26/25 for further work-up and management and pathology re-review was most consistent with AML with NPM1 mutation by WHO 2022 (which does not require a specific blast threshold). Following further multidisciplinary discussion, she started intensive induction with 7+3 (D1=3/5/25).  Her course has been complicated by neutropenic fevers, influenza A, AOM, and L TM perforation.     Today:  - D11 from 7+3 induction. Midcycle marrow scheduled 3/19 @ 1100, orders to be placed.   - Improvement/near resolution of GERD symptoms w/ epigastric pain s/p addition of PPI. Denies recurrent N/V, no TTP, continues to move bowels regularly w/o melena. C/w famotidine, prn Tums and lifestyle modifications.   - Continue with best supportive cares.     HEME  # AML with NPM1 mutation  Had been seen by PCP Dec 2024 w/ progressive fatigue and nausea where she was found leukopenic WBC  2.4 and neutropenic w/ ANC 0.3. Hgb 12. Was referred to local hematology/oncology clinic for further evaluation and seen by Dr. Harris. BMBx 2/10/25 done at OSH showed hypercellular marrow w/ trilineage hematopoiesis w/ dyspoiesis with mildly elevated blasts of 4% on morphology. NGS w/ NPM1, IDH2, and NRAS mutations. She was admitted to [facility] 2/26/25 for further workup and management. Slides were re-reviewed by [facility] Hematopathology, who noted hypercellular marrow with 8% blasts by  morphology. Despite relatively low blast percentage, findings were felt most consistent with a diagnosis AML with NPM1 mutation by WHO 2022 (which does not require a specific blast threshold). Following interdepartmental discussions and review of the available literature, patient was started on intensive induction with 7+3 (Day 1=3/5/25).   - PICC placed 3/5/2025, plan to discontinue on discharge.   - Baseline cardiopulmonary studies:  - Echo w/ EF 60-65%, mild known aortic stenosis.   - EKG (2/27) with NSR, QTc 412  - CXR (2/26) with mildly increased streaky bibasilar opacities, atelectasis or edema. No consolidation.   - Baseline viral serologies: CMV IgG+, EBV IgG+, HepB sAg-, HepB cAb-, HepB sAb-, HSV1+/2+, HIV-  - HLA Typing sent on admission. Message sent to notify BMT team x2/27 for IP consult.   - Midcycle marrow scheduled 3/19 @ 1100, orders to be placed closer to date.        Treatment plan: 7+3 (C1D1=3/5/2025)    - Daunorubicin 60 mg/m  IV D1-3    - Cytarabine 100 mg/m  IV D1-7      - Pre-medications: zofran, dexamethasone 12 mg D1-3     # Pancytopenia  Secondary to underlying hematologic malignancy and exacerbated by recent chemotherapy.  - Follow daily CBC with differential  - Transfuse to keep Hgb >7, plt >10K  - Blood consent obtained 2/26/25 on admission and placed in patient's paper chart.      # Risk for TLS  # Hyperphosphatemia, resolved  Secondary to hematologic malignancy, no evidence of TLS on admission.   - Allopurinol 300 mg daily x7 days (through 3/11)  - Follow TLS labs daily    # Risk for DIC  Secondary to underlying hematologic malignancy.  - Follow coags every MWF  - Transfuse cryo to keep fibrinogen >100, FFP to keep INR <1.8     ID  # ID prophylaxis  - Acyclovir 400 mg BID  - Levaquin 500 mg daily  - Voriconazole 200 mg BID  - PLC placed on admission for posa/vori coverage; vori w/ $1.60 copay, posa requiring PA. Rotated from deny to vori x3/13 w/ completion of chemotherapy.      #  Neutropenic fever, resolved  # Influenza A, resolved  3/13: Febrile 100.4 overnight x3/13. No localizing/new symptoms. OVSS. Patient was wearing heated jacket that she attributed to temperature, no documentation noted of notifying cross cover MD. No further ID workup (BCx, UA, UC, or CXR) or broadening of abx completed. Given prolonged period on AM chart check x3/14 since documented fever and patient has remained afebrile and OVSS, will defer further workup/initiation of abx.   - If refevers, then would obtain ID workup w/ BCx2, UA, UC, CXR, and broaden abx to cefepime.   2/25: On admission, patient noted subjective fever with chills and rhinitis beginning 2/25 PM prior to admission. No other localizing s/sx of infection. She was afebrile on admission but spiked a low-grade fever to 100.7 on 2/26 PM. Blood and urine cultures negative, CXR with streaky opacities but no burt consolidation. Work-up positive for influenza A, and she completed a course of Tamiflu 75 mg BID x5 days (2/26-3/3). She also received a short empiric course of IV cefepime (2/26-3/1) given concurrent neutropenia.  - Monitor for recurrent fevers    # R AOM with purulent effusion, resolved  # Small L TM perforation, resolved  Patient noted B/L ear pain, L>R s/p drainage from R side on 3/3. Noted h/o recurrent AOMs. On exam, TMs appear full with erythema to outer edges, patient noted pain w/ exam though able to tolerate. Small TM perforation noted to L side. Query if perforation from recurrent AOM or 2/2 congestion and fluid from recent influenza.  - ENT consulted, appreciate recs:  - Recommended 7-day course Augmentin (per cross-cover discussion with pharmacy, given h/o allergic reaction to Augmentin, increased dose of levofloxacin from 500 mg ? 750 mg daily (3/5-3/11) then plan to deescalate to ppx levaquin 500 mg daily.   - 5 day course of floxin drops to left ear for perforation (3/3-3/7)  - Follow up w/ PCP for left TM perforation - if  persistent in 6-8 weeks recommend outpt ENT referral.     GI/  # GERD, improving  # Epigastric pain, resolved  Reported recent increase in symptoms in the days leading up to admission and initiated famotidine.   - 3/9: Patient reported onset of midsternal chest pain that felt like reflux symptoms shortly after eating lunch.  EKG NSR.  States on 3/8, she experienced episode of emesis shortly after eating without preceding symptoms, no nausea at the time.  States this has happened before on occasion and has a history of this happening in the past.  No radiation of pain.  She also wondered if this may be related to the nicotine patch which she removed as she was also experiencing tachycardia, though had also been drinking energy drinks which she is trying to cut back on.  Pain is not reproducible on exam.  Also feels anxiety is attributing to some of her symptoms.  - 3/14: Ongoing GERD symptoms w/ epigastric pain, dull pain localized to epigastric region, worsened w/ N/V and anxiety. Rates 2-3/10, no TTP or reproducible symptoms. Denies melena, dark tarry stools, hematemesis w/ emetic episodes x3/13, or other sx c/f UGI. Of note, AM Hgb 6.1, will transfuse 1 unit pRBCs and follow for recheck. Add daily PPI to regimen. If persistent/worsening then could consider GI consult.   - Increase famotidine 10 mg BID ? 20 mg BID x3/9.   - Protonix 40 mg daily x3/14   - Continue to encourage lifestyle modifications  - TUMS prn  - SLP consulted 3/10 - Ok for regular diet w/ thin liquids    # Nausea/vomiting, recurrent  Patient noted ongoing nausea w/ occasional vomiting starting Dec 2024. Has been managed with PRN Zofran.  Endorsed nausea on admission, now resolved.   - PRN Zofran and compazine  - Could consider trial of PRN Zyprexa if 3rd agent is desired/required     # Urinary frequency, resolved  # Dysuria, resolved  On admission, patient noted longstanding history of urinary frequency though new mild dysuria. Denies  "hematuria, bladder tenderness of CVA tenderness. UA (2/26) negative, UC with no growth. Symptoms have since resolved.     PULM  # COPD  Longstanding history of COPD. On admission patient reports symptoms are manageable and no recent exacerbations. Most recent PFTs (2018) were normal.  - Continue PTA Breo Ellipta inhaler and PRN DuoNebs      CV  #Non-radiating chest pain, resolved  Patient noted new non-radiating mid-sternal chest pain with associated reflux-like symptoms with anxiety x3/9 after eating lunch. EKG showed NSR. Noted x3/8, experiencing episode of emesis shortly after eating without preceding symptoms, no nausea at the time, and similar mid sternal discomfort that felt like her \"esophagus\".  States this has happened before on occasion and has a history of this happening in the past.  She also wondered if this may be related to the nicotine patch which she removed as she was also experiencing tachycardia, though had also been drinking energy drinks which she is trying to cut back on.  Pain not reproducible on exam.  Also feels anxiety is attributing to some of her symptoms as detailed below.     # Essential hypertension  - Continue PTA lisinopril - HELD 3/11 w/ soft BPs (90s-100s/50s-60s), resume pending daily trends     # Mixed hyperlipidemia  Lipid panel has remained at goal, 1/14/25 panel w/ cholesterol 163, , LDL 92, HDL 45.   - Held PTA atorvastatin on admission due to chemotherapy interactions; will also likely have DDI with azole once started. Consider rotation to rosuvastatin for better drug-drug interaction profile.     # H/o aortic stenosis  Patient noted on admission longstanding history of aortic stenosis. Pre-chemo echo w/ EF 60-65% and mild aortic stenosis and insufficiency. No previous echo to compare.      RENAL/FEN  # Stage 2 CKD  Baseline Cr ~ 0.7. On admission Cr 0.77.  - Continue to trend on daily labs and encourage PO hydration     # Mild hyponatremia, resolved  New mild " hyponatremia noted 3/13 with Na 135 ? 130. Asymptomatic. Trial 1 L NS. If persistent/wrosening then could consider initiation of salt tabs, hypertonic saline, and would obtain further workup with urine lytes.     NEURO  # Degenerative disc disease, L5-S1  - Continue PTA gabapentin     # Chronic migraine w/o aura  Present since childhood. Reportedly triggered by neck manipulation/movement. Reportedly well-managed with regimen below.  - Continue PTA sumatriptan and cyclobenzaprine PRN     ENDO  # Prediabetes  Last A1c 6.0 x12/9/24. Has been managing with lifestyle modifications.      MISC  # Rheumatoid arthritis   Patient reported trying treatment though was unable to tolerate well. Managed with Tylenol PRN. Most recnet RF >650 (2/10/25). JI negative.       # BROOKS  # MDD  # At risk for adjustment disorder  Patient reports good control of anxiety/depressive symptoms prior to admission. Did note feeling overwhelmed by new diagnosis. On admission discussed inpatient services such as health psychology or cordelia, she respectfully declined though will consider. Ongoing discussion regarding symptoms of anxiety and depression continued throughout admission; patient endorses continuous chemo limiting accessibility to previously frequent trips outside has affected symptoms, feeling hopeful w/ cytarabine to complete tomorrow PM. Discussed increase in dose of Paxil and further d/w health psych; she declined at this time. C/w prn atarax and Paxil.   - Readdress health psych consult as indicated  - Continue PTA paroxetine   - Atarax as needed     # Vitamin D deficiency  Continue PTA vit D supplement    # Seasonal allergies - Claritin 10 mg daily     # Tobacco use   Has smoked since age 16, 1.5 ppd (roughly 65 pack years). Attempting to cut back as recently as 01/2025 to 0.5 ppd. We discussed the risks of smoking during induction chemotherapy including increased risk for infection in setting of severe neutropenia that could further  complicate course, even leading to death. She expressed understanding and appreciation of conversation.   - Encourage smoking cessation, readdress NRT as needed.   - Nicotine patch ordered 2/28. Patient had been intermittently declining patch and reported associated increased symptoms of anxiety, and felt like patch precipitated anxiety attack w/ chest pain on 3/9. Discontinued 3/11, offered trial of lower dose patch or alternative NRT that patient declined.    - Discussed option of smoking cessation team, patient declined at this time.    Fluids/Electrolytes/Nutrition   - IVF prn   - PRN lyte replacement per standing protocol  - Regular diet as tolerated     Lines: PICC    PPX  VTE: TCP, Lovenox held 3/13  GI: pepcid  Bowels: prn senna and miralax  Activity: as tolerated    Code: DNR/DNI    Medically Ready for Discharge: Anticipated in 5+ Days    Disposition: Admitted for further workup and management. Discharge pending treatment decisions and clinical course.   Follow-up: Will need to request APPT18 closer to discharge date and determine primary oncologist.     45 MINUTES SPENT BY ME on the date of service doing chart review, history, exam, documentation & further activities per the note.      Staffed with Dr. Moreno.    Chelsie Murphy PA-C  Hematology/Oncology  Pager #[phone]    Interval History   No acute overnight events. Nursing notes reviewed.      Alejandra is feeling well this morning, seen ambulating the hallways multiple times today. She reports much improvement of GERD symptoms w/ epigastric pain. Reported resolution in pain and minimal N/V s/p addition of PPI yesterday. She expressed concern regarding care of her animals, we discussed anticipated admission for at least another ~ 3 weeks though largely remains pending repeat bone marrow biopsies and count recovery. Her sister lives next door and she expressed that she will update her to help with and arrange care. Alejandra expressed ongoing  "appreciation for hospital staff though feeling homesick. Writer validated her feelings and we discussed ways to make her room feel more like home that she appreciated. She otherwise denies fever, chills, mouth sores, SOB, cough, abdominal pain, diarrhea, constipation, nausea, vomiting, dysuria, hematuria, numbness, tingling, swelling. Denies further questions or concerns at this time.     A comprehensive ROS was performed and was negative except as detailed in the HPI above.     Physical Exam   Temp: 98.5  F (36.9  C) Temp src: Oral BP: 128/75 Pulse: 89   Resp: 18 SpO2: 99 % O2 Device: None (Room air)    Vitals:    03/13/25 1200 03/14/25 1012 03/15/25 0727   Weight: 77.7 kg (171 lb 6.4 oz) 77.7 kg (171 lb 4.8 oz) 77 kg (169 lb 11.2 oz)     Vital Signs with Ranges  Temp:  [98.4  F (36.9  C)-100  F (37.8  C)] 98.5  F (36.9  C)  Pulse:  [76-92] 89  Resp:  [18] 18  BP: (116-130)/(70-81) 128/75  SpO2:  [96 %-99 %] 99 %  I/O last 3 completed shifts:  In: 1080 [P.O.:780]  Out: 1250 [Urine:1250]      Physical Exam:    Blood pressure 128/75, pulse 89, temperature 98.5  F (36.9  C), temperature source Oral, resp. rate 18, height 1.626 m (5' 4\"), weight 77 kg (169 lb 11.2 oz), last menstrual period 01/01/2007, SpO2 99%, not currently breastfeeding.    Constitutional: Pleasant and cooperative female, in NAD. Alert, interactive, non-toxic. Smiling and conversational.  HEENT: NC/AT, sclera clear, conjunctiva normal, OP with MMM, no discrete intraoral lesions or thrush, poor dentition  Respiratory: No increased work of breathing, CTAB, no crackles or wheezing.  Cardiovascular: RRR, harsh blowing systolic murmur (I-II/VI) heard at the LUSB. No peripheral edema. No calf tenderness or palpable cords.  GI: Normal bowel sounds. Abdomen is soft, non-distended and non-tender to palpation.  Skin: Warm, dry, well-perfused. No bruising, bleeding, rashes, or lesions on limited exam.  Musculoskeletal: Diminished muscle bulk, no gross " deformities.  Neurologic: A&O. Answers questions appropriately, speech normal. Moves all extremities spontaneously.  Psychiatric: Normal mood and affect.  Vascular access:  PICC on RUE CDI, non-tender, no surrounding erythema.    Medications   Current Facility-Administered Medications   Medication Dose Route Frequency Provider Last Rate Last Admin     Current Facility-Administered Medications   Medication Dose Route Frequency Provider Last Rate Last Admin    acyclovir (ZOVIRAX) tablet 400 mg  400 mg Oral BID Chelsie Murphy PA-C   400 mg at 03/15/25 0907    famotidine (PEPCID) tablet 20 mg  20 mg Oral BID Zully Garcia PA-C   20 mg at 03/15/25 0907    fluticasone-vilanterol (BREO ELLIPTA) 100-25 MCG/ACT inhaler 1 puff  1 puff Inhalation Daily Chelsie Murphy PA-C   1 puff at 03/15/25 1046    heparin lock flush 10 unit/mL injection 5-20 mL  5-20 mL Intracatheter Q24H Chelsie Murphy PA-C   5 mL at 03/13/25 0644    heparin lock flush 10 unit/mL injection 5-20 mL  5-20 mL Intracatheter Q24H Chelsie Murphy PA-C   5 mL at 03/14/25 2151    levofloxacin (LEVAQUIN) tablet 500 mg  500 mg Oral Daily Zully Garcia PA-C   500 mg at 03/15/25 0907    [Held by provider] lisinopril (ZESTRIL) tablet 10 mg  10 mg Oral Daily Jyoti Tenorio PA-C   10 mg at 03/10/25 1039    loratadine (CLARITIN) tablet 10 mg  10 mg Oral Daily Chelsie Murphy PA-C   10 mg at 03/15/25 0907    pantoprazole (PROTONIX) EC tablet 40 mg  40 mg Oral QAM AC Chelsie Murphy PA-C   40 mg at 03/15/25 0908    PARoxetine (PAXIL) tablet 20 mg  20 mg Oral QAM Chelsie Murphy PA-C   20 mg at 03/15/25 0908    sodium chloride (PF) 0.9% PF flush 10-40 mL  10-40 mL Intracatheter Q8H Chelsie Murphy PA-C   10 mL at 03/14/25 1722    Vitamin D3 (CHOLECALCIFEROL) tablet 1,000 Units  1,000 Units Oral Daily Chelsie Murphy PA-C   1,000 Units at 03/15/25 0908     voriconazole (VFEND) tablet 200 mg  200 mg Oral Q12H [facility] (08/20) Chelsie Murphy PA-C   200 mg at 03/15/25 0908       Data   Results for orders placed or performed during the hospital encounter of 02/26/25 (from the past 24 hours)   Lactic Acid Whole Blood w/ 1x repeat in 2 hrs when >2   Result Value Ref Range    Lactic Acid, Initial 0.9 0.7 - 2.0 mmol/L   CBC with platelets differential    Narrative    The following orders were created for panel order CBC with platelets differential.  Procedure                               Abnormality         Status                     ---------                               -----------         ------                     CBC with platelets and ...[[id number]]  Abnormal            Final result               RBC and Platelet Morpho...[[id number]]                      Final result               Manual Differential[[id number]]         Abnormal            Final result                 Please view results for these tests on the individual orders.   Comprehensive metabolic panel   Result Value Ref Range    Sodium 134 (L) 135 - 145 mmol/L    Potassium 3.5 3.4 - 5.3 mmol/L    Carbon Dioxide (CO2) 17 (L) 22 - 29 mmol/L    Anion Gap 15 7 - 15 mmol/L    Urea Nitrogen 14.0 6.0 - 20.0 mg/dL    Creatinine 0.66 0.51 - 0.95 mg/dL    GFR Estimate >90 >60 mL/min/1.73m2    Calcium 8.6 (L) 8.8 - 10.4 mg/dL    Chloride 102 98 - 107 mmol/L    Glucose 159 (H) 70 - 99 mg/dL    Alkaline Phosphatase 112 40 - 150 U/L    AST 13 0 - 45 U/L    ALT 9 0 - 50 U/L    Protein Total 6.2 (L) 6.4 - 8.3 g/dL    Albumin 3.5 3.5 - 5.2 g/dL    Bilirubin Total 1.1 <=1.2 mg/dL   Magnesium   Result Value Ref Range    Magnesium 2.0 1.7 - 2.3 mg/dL   Lactate Dehydrogenase (aka LDH)   Result Value Ref Range    Lactate Dehydrogenase 238 0 - 250 U/L   Phosphorus   Result Value Ref Range    Phosphorus 3.5 2.5 - 4.5 mg/dL   Uric acid   Result Value Ref Range    Uric Acid 3.5 2.4 - 5.7 mg/dL   CBC with platelets and  differential   Result Value Ref Range    WBC Count 0.5 (LL) 4.0 - 11.0 10e3/uL    RBC Count 2.30 (L) 3.80 - 5.20 10e6/uL    Hemoglobin 8.0 (L) 11.7 - 15.7 g/dL    Hematocrit 21.9 (L) 35.0 - 47.0 %    MCV 95 78 - 100 fL    MCH 34.8 (H) 26.5 - 33.0 pg    MCHC 36.5 31.5 - 36.5 g/dL    RDW 18.1 (H) 10.0 - 15.0 %    Platelet Count 16 (LL) 150 - 450 10e3/uL   RBC and Platelet Morphology   Result Value Ref Range    RBC Morphology Confirmed RBC Indices     Platelet Assessment  Automated Count Confirmed. Platelet morphology is normal.     Automated Count Confirmed. Platelet morphology is normal.   Manual Differential   Result Value Ref Range    % Neutrophils 29 %    % Lymphocytes 67 %    % Monocytes 4 %    % Eosinophils 0 %    % Basophils 0 %    Absolute Neutrophils 0.1 (LL) 1.6 - 8.3 10e3/uL    Absolute Lymphocytes 0.3 (L) 0.8 - 5.3 10e3/uL    Absolute Monocytes 0.0 0.0 - 1.3 10e3/uL    Absolute Eosinophils 0.0 0.0 - 0.7 10e3/uL    Absolute Basophils 0.0 0.0 - 0.2 10e3/uL   Lactic Acid Whole Blood w/ 1x repeat in 2 hrs when >2   Result Value Ref Range    Lactic Acid, Initial 0.9 0.7 - 2.0 mmol/L

## 2025-03-15 NOTE — PLAN OF CARE
"/75 (BP Location: Left arm)   Pulse 89   Temp 98.5  F (36.9  C) (Oral)   Resp 18   Ht 1.626 m (5' 4\")   Wt 77 kg (169 lb 11.2 oz)   LMP 01/01/2007 (Approximate)   SpO2 99%   BMI 29.13 kg/m     Time: 5491-7350  Reason for admission: AML  Activity: independent.   Pain: denied pain or discomfort.   Neuro: alert and oriented.   Cardiac: WDL  Resp: denied SOB, lung sounds clear bilaterally.   GI/: on regular diet, voids without difficulties, bowel sounds present x four quadrants.   Lines: PICC heparin locked.   Skin: WDL X  Labs/Imaging: no lab or imaging this shift.   New changes to shift: no change.   Plan: continue with current plan of cares.    "

## 2025-03-15 NOTE — PLAN OF CARE
[id number]    D11 7+3. VSS, afebrile and on RA. A&Ox4. Rated 4/10 generalized body aches, managed with PRN tylenol x1 with effectiveness. Continues with intermittent nausea, however pt reporting improvement. Gave zofran x1 prior to lunch. States heartburn has become better as well. No SOB reported. Ambulating frequently outside. Continue w/ POC.       Goal Outcome Evaluation:      Plan of Care Reviewed With: patient    Overall Patient Progress: no changeOverall Patient Progress: no change    Outcome Evaluation: Day 11 7+3

## 2025-03-16 LAB
ABO + RH BLD: NORMAL
ALBUMIN SERPL BCG-MCNC: 3.6 G/DL (ref 3.5–5.2)
ALP SERPL-CCNC: 120 U/L (ref 40–150)
ALT SERPL W P-5'-P-CCNC: 9 U/L (ref 0–50)
ANION GAP SERPL CALCULATED.3IONS-SCNC: 11 MMOL/L (ref 7–15)
AST SERPL W P-5'-P-CCNC: 11 U/L (ref 0–45)
BASOPHILS # BLD AUTO: 0 10E3/UL (ref 0–0.2)
BASOPHILS NFR BLD AUTO: 0 %
BILIRUB SERPL-MCNC: 0.8 MG/DL
BLD GP AB SCN SERPL QL: NEGATIVE
BLD PROD TYP BPU: NORMAL
BLOOD COMPONENT TYPE: NORMAL
BUN SERPL-MCNC: 15.2 MG/DL (ref 6–20)
CALCIUM SERPL-MCNC: 8.8 MG/DL (ref 8.8–10.4)
CHLORIDE SERPL-SCNC: 103 MMOL/L (ref 98–107)
CODING SYSTEM: NORMAL
CREAT SERPL-MCNC: 0.7 MG/DL (ref 0.51–0.95)
EGFRCR SERPLBLD CKD-EPI 2021: >90 ML/MIN/1.73M2
EOSINOPHIL # BLD AUTO: 0 10E3/UL (ref 0–0.7)
EOSINOPHIL NFR BLD AUTO: 0 %
ERYTHROCYTE [DISTWIDTH] IN BLOOD BY AUTOMATED COUNT: 17.4 % (ref 10–15)
GLUCOSE SERPL-MCNC: 116 MG/DL (ref 70–99)
HCO3 SERPL-SCNC: 21 MMOL/L (ref 22–29)
HCT VFR BLD AUTO: 22.2 % (ref 35–47)
HGB BLD-MCNC: 7.9 G/DL (ref 11.7–15.7)
IMM GRANULOCYTES # BLD: 0 10E3/UL
IMM GRANULOCYTES NFR BLD: 0 %
ISSUE DATE AND TIME: NORMAL
LDH SERPL L TO P-CCNC: 231 U/L (ref 0–250)
LYMPHOCYTES # BLD AUTO: 0.7 10E3/UL (ref 0.8–5.3)
LYMPHOCYTES NFR BLD AUTO: 80 %
MAGNESIUM SERPL-MCNC: 2.1 MG/DL (ref 1.7–2.3)
MCH RBC QN AUTO: 33.9 PG (ref 26.5–33)
MCHC RBC AUTO-ENTMCNC: 35.6 G/DL (ref 31.5–36.5)
MCV RBC AUTO: 95 FL (ref 78–100)
MONOCYTES # BLD AUTO: 0 10E3/UL (ref 0–1.3)
MONOCYTES NFR BLD AUTO: 5 %
NEUTROPHILS # BLD AUTO: 0.1 10E3/UL (ref 1.6–8.3)
NEUTROPHILS NFR BLD AUTO: 16 %
NRBC # BLD AUTO: 0 10E3/UL
NRBC BLD AUTO-RTO: 0 /100
PHOSPHATE SERPL-MCNC: 3.8 MG/DL (ref 2.5–4.5)
PLAT MORPH BLD: ABNORMAL
PLATELET # BLD AUTO: 8 10E3/UL (ref 150–450)
POTASSIUM SERPL-SCNC: 3.7 MMOL/L (ref 3.4–5.3)
PROT SERPL-MCNC: 6.7 G/DL (ref 6.4–8.3)
RBC # BLD AUTO: 2.33 10E6/UL (ref 3.8–5.2)
RBC MORPH BLD: ABNORMAL
SODIUM SERPL-SCNC: 135 MMOL/L (ref 135–145)
SPECIMEN EXP DATE BLD: NORMAL
UNIT ABO/RH: NORMAL
UNIT NUMBER: NORMAL
UNIT STATUS: NORMAL
UNIT TYPE ISBT: 6200
URATE SERPL-MCNC: 4 MG/DL (ref 2.4–5.7)
VARIANT LYMPHS BLD QL SMEAR: PRESENT
WBC # BLD AUTO: 0.9 10E3/UL (ref 4–11)

## 2025-03-16 PROCEDURE — 84460 ALANINE AMINO (ALT) (SGPT): CPT

## 2025-03-16 PROCEDURE — 83615 LACTATE (LD) (LDH) ENZYME: CPT

## 2025-03-16 PROCEDURE — 86900 BLOOD TYPING SEROLOGIC ABO: CPT

## 2025-03-16 PROCEDURE — P9037 PLATE PHERES LEUKOREDU IRRAD: HCPCS

## 2025-03-16 PROCEDURE — 86923 COMPATIBILITY TEST ELECTRIC: CPT

## 2025-03-16 PROCEDURE — 84100 ASSAY OF PHOSPHORUS: CPT | Performed by: PHYSICIAN ASSISTANT

## 2025-03-16 PROCEDURE — 999N000007 HC SITE CHECK

## 2025-03-16 PROCEDURE — 250N000013 HC RX MED GY IP 250 OP 250 PS 637

## 2025-03-16 PROCEDURE — 82435 ASSAY OF BLOOD CHLORIDE: CPT

## 2025-03-16 PROCEDURE — 99233 SBSQ HOSP IP/OBS HIGH 50: CPT | Mod: FS

## 2025-03-16 PROCEDURE — 250N000011 HC RX IP 250 OP 636

## 2025-03-16 PROCEDURE — 84550 ASSAY OF BLOOD/URIC ACID: CPT | Performed by: PHYSICIAN ASSISTANT

## 2025-03-16 PROCEDURE — 84155 ASSAY OF PROTEIN SERUM: CPT

## 2025-03-16 PROCEDURE — 83735 ASSAY OF MAGNESIUM: CPT

## 2025-03-16 PROCEDURE — 85025 COMPLETE CBC W/AUTO DIFF WBC: CPT

## 2025-03-16 PROCEDURE — 120N000002 HC R&B MED SURG/OB UMMC

## 2025-03-16 RX ADMIN — ACETAMINOPHEN 650 MG: 325 TABLET, FILM COATED ORAL at 05:16

## 2025-03-16 RX ADMIN — ACYCLOVIR 400 MG: 400 TABLET ORAL at 08:07

## 2025-03-16 RX ADMIN — ONDANSETRON HYDROCHLORIDE 4 MG: 4 TABLET, FILM COATED ORAL at 08:11

## 2025-03-16 RX ADMIN — HEPARIN, PORCINE (PF) 10 UNIT/ML INTRAVENOUS SYRINGE 5 ML: at 05:04

## 2025-03-16 RX ADMIN — FLUTICASONE FUROATE AND VILANTEROL TRIFENATATE 1 PUFF: 100; 25 POWDER RESPIRATORY (INHALATION) at 08:07

## 2025-03-16 RX ADMIN — PANTOPRAZOLE SODIUM 40 MG: 40 TABLET, DELAYED RELEASE ORAL at 08:06

## 2025-03-16 RX ADMIN — Medication 1000 UNITS: at 08:07

## 2025-03-16 RX ADMIN — VORICONAZOLE 200 MG: 200 TABLET, FILM COATED ORAL at 08:07

## 2025-03-16 RX ADMIN — ACYCLOVIR 400 MG: 400 TABLET ORAL at 20:10

## 2025-03-16 RX ADMIN — ACETAMINOPHEN 650 MG: 325 TABLET, FILM COATED ORAL at 09:32

## 2025-03-16 RX ADMIN — ACETAMINOPHEN 650 MG: 325 TABLET, FILM COATED ORAL at 20:10

## 2025-03-16 RX ADMIN — HYDROXYZINE HYDROCHLORIDE 25 MG: 25 TABLET, FILM COATED ORAL at 23:17

## 2025-03-16 RX ADMIN — LORATADINE 10 MG: 10 TABLET ORAL at 08:06

## 2025-03-16 RX ADMIN — ACETAMINOPHEN 650 MG: 325 TABLET, FILM COATED ORAL at 16:06

## 2025-03-16 RX ADMIN — FAMOTIDINE 20 MG: 20 TABLET, FILM COATED ORAL at 20:10

## 2025-03-16 RX ADMIN — PAROXETINE HYDROCHLORIDE 20 MG: 20 TABLET, FILM COATED ORAL at 08:11

## 2025-03-16 RX ADMIN — FLUTICASONE PROPIONATE 2 SPRAY: 50 SPRAY, METERED NASAL at 08:07

## 2025-03-16 RX ADMIN — FAMOTIDINE 20 MG: 20 TABLET, FILM COATED ORAL at 08:07

## 2025-03-16 RX ADMIN — LEVOFLOXACIN 500 MG: 500 TABLET, FILM COATED ORAL at 08:07

## 2025-03-16 RX ADMIN — HYDROXYZINE HYDROCHLORIDE 25 MG: 25 TABLET, FILM COATED ORAL at 10:34

## 2025-03-16 RX ADMIN — VORICONAZOLE 200 MG: 200 TABLET, FILM COATED ORAL at 20:10

## 2025-03-16 NOTE — PLAN OF CARE
"Goal Outcome Evaluation:      Plan of Care Reviewed With: patient    Overall Patient Progress: no changeOverall Patient Progress: no change    Outcome Evaluation: C1D12 7+3    0700 - 1930:   /70 (BP Location: Right arm, Cuff Size: Adult Regular)   Pulse 75   Temp 98.4  F (36.9  C) (Oral)   Resp 16   Ht 1.626 m (5' 4\")   Wt 76.7 kg (169 lb 1.6 oz)   LMP 01/01/2007 (Approximate)   SpO2 98%   BMI 29.03 kg/m      A&O x 4, AVSS, gave tylenol x 2 for mild headache.   Gave zofran x 1 for mild nausea per pt request. PRN compazine not helpful per pt.  Transfused 1 unit of platelet for Plt 8* & pt tolerated well.  UAL, ambulates in hallway, voiding spontaneously, had a bm this morning.  Plan for bmbx on 3/19 at 11 AM.  Continue with poc...      "

## 2025-03-16 NOTE — PLAN OF CARE
[id number]    Activity:   independent  LDA's:  PICC - HL  Vitals: VSS on RA  Pain/Neuro: A&Ox4, Denies nausea, pain in neck/head 4/10 managed with PRN tylenol.  Cardiac: WDL  Respiratory: WDL  GI/: Voids spontaneously without difficulty  Diet: Regular and tolerating well  Skin: No new concerns    Labs/imaging: Plt 8 this am and prepare orders have been released.     Other Cares/Comments: Pt able to rest between cares. Toilet in room broken and continually running and work order has been placed.     BMBx scheduled 3/19 @ 1100    Continue with POC      Goal Outcome Evaluation:      Plan of Care Reviewed With: patient    Overall Patient Progress: no changeOverall Patient Progress: no change    Outcome Evaluation: C1D11 7+3

## 2025-03-16 NOTE — PROGRESS NOTES
[facility]    Hematology / Oncology Progress Note  Hospital Day # 18  Date of Service (when I saw the patient): 03/16/2025     Assessment & Plan   Alejandra Villegas is a 59 year old female with a past medical history significant for COPD, CKD, migraines, rheumatoid arthritis, aortic stenosis, Afib, CHF, and anxiety who presented to an outside hospital with cytopenias and was found on BMBx to have hypercellular marrow with 4% blasts, 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 She was subsequently admitted to [facility] on 2/26/25 for further work-up and management and pathology re-review was most consistent with AML with NPM1 mutation by WHO 2022 (which does not require a specific blast threshold). Following further multidisciplinary discussion, she started intensive induction with 7+3 (D1=3/5/25).  Her course has been complicated by neutropenic fevers, influenza A, AOM, and L TM perforation.     Today:  - D12 from 7+3 induction.   - Midcycle marrow scheduled 3/19 @ 1100, orders to be placed.   - Continue with best supportive cares.     HEME  # AML with NPM1 mutation  Had been seen by PCP Dec 2024 w/ progressive fatigue and nausea where she was found leukopenic WBC  2.4 and neutropenic w/ ANC 0.3. Hgb 12. Was referred to local hematology/oncology clinic for further evaluation and seen by Dr. Harris. BMBx 2/10/25 done at OSH showed hypercellular marrow w/ trilineage hematopoiesis w/ dyspoiesis with mildly elevated blasts of 4% on morphology. NGS w/ NPM1, IDH2, and NRAS mutations. She was admitted to [facility] 2/26/25 for further workup and management. Slides were re-reviewed by [facility], who noted hypercellular marrow with 8% blasts by morphology. Despite relatively low blast percentage, findings were felt most consistent with a diagnosis AML with NPM1 mutation by WHO 2022 (which does not require a specific blast threshold). Following  interdepartmental discussions and review of the available literature, patient was started on intensive induction with 7+3 (Day 1=3/5/25).   - PICC placed 3/5/2025, plan to discontinue on discharge.   - Baseline cardiopulmonary studies:  - Echo w/ EF 60-65%, mild known aortic stenosis.   - EKG (2/27) with NSR, QTc 412  - CXR (2/26) with mildly increased streaky bibasilar opacities, atelectasis or edema. No consolidation.   - Baseline viral serologies: CMV IgG+, EBV IgG+, HepB sAg-, HepB cAb-, HepB sAb-, HSV1+/2+, HIV-  - HLA Typing sent on admission. Message sent to notify BMT team x2/27 for IP consult.   - Midcycle marrow scheduled 3/19 @ 1100, orders to be placed closer to date.        Treatment plan: 7+3 (C1D1=3/5/2025)    - Daunorubicin 60 mg/m  IV D1-3    - Cytarabine 100 mg/m  IV D1-7      - Pre-medications: zofran, dexamethasone 12 mg D1-3     # Pancytopenia  Secondary to underlying hematologic malignancy and exacerbated by recent chemotherapy.  - Follow daily CBC with differential  - Transfuse to keep Hgb >7, plt >10K  - Blood consent obtained 2/26/25 on admission and placed in patient's paper chart.      # Risk for TLS  # Hyperphosphatemia, resolved  Secondary to hematologic malignancy, no evidence of TLS on admission.   - Allopurinol 300 mg daily x7 days (through 3/11)  - Follow TLS labs daily    # Risk for DIC  Secondary to underlying hematologic malignancy.  - Follow coags every MWF  - Transfuse cryo to keep fibrinogen >100, FFP to keep INR <1.8     ID  # ID prophylaxis  - Acyclovir 400 mg BID  - Levaquin 500 mg daily  - Voriconazole 200 mg BID  - PLC placed on admission for posa/vori coverage; vori w/ $1.60 copay, posa requiring PA. Rotated from deny to vori x3/13 w/ completion of chemotherapy.      # Neutropenic fever, resolved  # Influenza A, resolved  3/13: Febrile 100.4 overnight x3/13. No localizing/new symptoms. OVSS. Patient was wearing heated jacket that she attributed to temperature, no  documentation noted of notifying cross cover MD. No further ID workup (BCx, UA, UC, or CXR) or broadening of abx completed. Given prolonged period on AM chart check x3/14 since documented fever and patient has remained afebrile and OVSS, will defer further workup/initiation of abx.   - If refevers, then would obtain ID workup w/ BCx2, UA, UC, CXR, and broaden abx to cefepime.   2/25: On admission, patient noted subjective fever with chills and rhinitis beginning 2/25 PM prior to admission. No other localizing s/sx of infection. She was afebrile on admission but spiked a low-grade fever to 100.7 on 2/26 PM. Blood and urine cultures negative, CXR with streaky opacities but no burt consolidation. Work-up positive for influenza A, and she completed a course of Tamiflu 75 mg BID x5 days (2/26-3/3). She also received a short empiric course of IV cefepime (2/26-3/1) given concurrent neutropenia.  - Monitor for recurrent fevers    # R AOM with purulent effusion, resolved  # Small L TM perforation, resolved  Patient noted B/L ear pain, L>R s/p drainage from R side on 3/3. Noted h/o recurrent AOMs. On exam, TMs appear full with erythema to outer edges, patient noted pain w/ exam though able to tolerate. Small TM perforation noted to L side. Query if perforation from recurrent AOM or 2/2 congestion and fluid from recent influenza.  - ENT consulted, appreciate recs:  - Recommended 7-day course Augmentin (per cross-cover discussion with pharmacy, given h/o allergic reaction to Augmentin, increased dose of levofloxacin from 500 mg ? 750 mg daily (3/5-3/11) then plan to deescalate to ppx levaquin 500 mg daily.   - 5 day course of floxin drops to left ear for perforation (3/3-3/7)  - Follow up w/ PCP for left TM perforation - if persistent in 6-8 weeks recommend outpt ENT referral.     GI/  # GERD, improving  # Epigastric pain, resolved  Reported recent increase in symptoms in the days leading up to admission and initiated  famotidine.   - 3/9: Patient reported onset of midsternal chest pain that felt like reflux symptoms shortly after eating lunch.  EKG NSR.  States on 3/8, she experienced episode of emesis shortly after eating without preceding symptoms, no nausea at the time.  States this has happened before on occasion and has a history of this happening in the past.  No radiation of pain.  She also wondered if this may be related to the nicotine patch which she removed as she was also experiencing tachycardia, though had also been drinking energy drinks which she is trying to cut back on.  Pain is not reproducible on exam.  Also feels anxiety is attributing to some of her symptoms.  - 3/14: Ongoing GERD symptoms w/ epigastric pain, dull pain localized to epigastric region, worsened w/ N/V and anxiety. Rates 2-3/10, no TTP or reproducible symptoms. Denies melena, dark tarry stools, hematemesis w/ emetic episodes x3/13, or other sx c/f UGI. Of note, AM Hgb 6.1, will transfuse 1 unit pRBCs and follow for recheck. Add daily PPI to regimen. If persistent/worsening then could consider GI consult.   - Increase famotidine 10 mg BID ? 20 mg BID x3/9.   - Protonix 40 mg daily x3/14   - Continue to encourage lifestyle modifications  - TUMS prn  - SLP consulted 3/10 - Ok for regular diet w/ thin liquids    # Nausea/vomiting, recurrent  Patient noted ongoing nausea w/ occasional vomiting starting Dec 2024. Has been managed with PRN Zofran.  Endorsed nausea on admission, now resolved.   - PRN Zofran and compazine  - Could consider trial of PRN Zyprexa if 3rd agent is desired/required     # Urinary frequency, resolved  # Dysuria, resolved  On admission, patient noted longstanding history of urinary frequency though new mild dysuria. Denies hematuria, bladder tenderness of CVA tenderness. UA (2/26) negative, UC with no growth. Symptoms have since resolved.     PULM  # COPD  Longstanding history of COPD. On admission patient reports symptoms are  "manageable and no recent exacerbations. Most recent PFTs (2018) were normal.  - Continue PTA Breo Ellipta inhaler and PRN DuoNebs      CV  #Non-radiating chest pain, resolved  Patient noted new non-radiating mid-sternal chest pain with associated reflux-like symptoms with anxiety x3/9 after eating lunch. EKG showed NSR. Noted x3/8, experiencing episode of emesis shortly after eating without preceding symptoms, no nausea at the time, and similar mid sternal discomfort that felt like her \"esophagus\".  States this has happened before on occasion and has a history of this happening in the past.  She also wondered if this may be related to the nicotine patch which she removed as she was also experiencing tachycardia, though had also been drinking energy drinks which she is trying to cut back on.  Pain not reproducible on exam.  Also feels anxiety is attributing to some of her symptoms as detailed below.     # Essential hypertension  - Continue PTA lisinopril - HELD 3/11 w/ soft BPs (90s-100s/50s-60s), resume pending daily trends     # Mixed hyperlipidemia  Lipid panel has remained at goal, 1/14/25 panel w/ cholesterol 163, , LDL 92, HDL 45.   - Held PTA atorvastatin on admission due to chemotherapy interactions; will also likely have DDI with azole once started. Consider rotation to rosuvastatin for better drug-drug interaction profile.     # H/o aortic stenosis  Patient noted on admission longstanding history of aortic stenosis. Pre-chemo echo w/ EF 60-65% and mild aortic stenosis and insufficiency. No previous echo to compare.      RENAL/FEN  # Stage 2 CKD  Baseline Cr ~ 0.7. On admission Cr 0.77.  - Continue to trend on daily labs and encourage PO hydration     # Mild hyponatremia, resolved  New mild hyponatremia noted 3/13 with Na 135 ? 130. Asymptomatic. Trial 1 L NS. If persistent/wrosening then could consider initiation of salt tabs, hypertonic saline, and would obtain further workup with urine lytes. "     NEURO  # Degenerative disc disease, L5-S1  - Continue PTA gabapentin     # Chronic migraine w/o aura  Present since childhood. Reportedly triggered by neck manipulation/movement. Reportedly well-managed with regimen below.  - Continue PTA sumatriptan and cyclobenzaprine PRN     ENDO  # Prediabetes  Last A1c 6.0 x12/9/24. Has been managing with lifestyle modifications.      MISC  # Rheumatoid arthritis   Patient reported trying treatment though was unable to tolerate well. Managed with Tylenol PRN. Most recnet RF >650 (2/10/25). JI negative.       # BROOKS  # MDD  # At risk for adjustment disorder  Patient reports good control of anxiety/depressive symptoms prior to admission. Did note feeling overwhelmed by new diagnosis. On admission discussed inpatient services such as health psychology or cordelia, she respectfully declined though will consider. Ongoing discussion regarding symptoms of anxiety and depression continued throughout admission; patient endorses continuous chemo limiting accessibility to previously frequent trips outside has affected symptoms, feeling hopeful w/ cytarabine to complete tomorrow PM. Discussed increase in dose of Paxil and further d/w health psych; she declined at this time. C/w prn atarax and Paxil.   - Readdress health psych consult as indicated  - Continue PTA paroxetine   - Atarax as needed     # Vitamin D deficiency  Continue PTA vit D supplement    # Seasonal allergies - Claritin 10 mg daily     # Tobacco use   Has smoked since age 16, 1.5 ppd (roughly 65 pack years). Attempting to cut back as recently as 01/2025 to 0.5 ppd. We discussed the risks of smoking during induction chemotherapy including increased risk for infection in setting of severe neutropenia that could further complicate course, even leading to death. She expressed understanding and appreciation of conversation.   - Encourage smoking cessation, readdress NRT as needed.   - Nicotine patch ordered 2/28. Patient had  been intermittently declining patch and reported associated increased symptoms of anxiety, and felt like patch precipitated anxiety attack w/ chest pain on 3/9. Discontinued 3/11, offered trial of lower dose patch or alternative NRT that patient declined.    - Discussed option of smoking cessation team, patient declined at this time.    Fluids/Electrolytes/Nutrition   - IVF prn   - PRN lyte replacement per standing protocol  - Regular diet as tolerated     Lines: PICC    PPX  VTE: TCP, Lovenox held 3/13  GI: pepcid  Bowels: prn senna and miralax  Activity: as tolerated    Code: DNR/DNI    Medically Ready for Discharge: Anticipated in 5+ Days    Disposition: Admitted for further workup and management. Discharge pending treatment decisions and clinical course.   Follow-up: Will need to request APPT18 closer to discharge date and determine primary oncologist.     45 MINUTES SPENT BY ME on the date of service doing chart review, history, exam, documentation & further activities per the note.      Staffed with Dr. Moreno.    Chelsie Murphy PA-C  Hematology/Oncology  Pager #[phone]    Interval History   No acute overnight events. Nursing notes reviewed.      Alejandra is feeling okay this morning, noted some nausea w/o emesis though alleviated s/p prn antiemetics. Able to tolerate bland breakfast. Reported feeling anxious yesterday regarding animal cares at home though now better after talking with her sister who is agreeable to help out. She denies new symptoms or concerns today. We discussed infectious risk with pancytopenias and neutropenic fever workup, she expressed appreciation. Denies fever, chills, mouth sores, SOB, cough, abdominal pain, diarrhea, constipation, nausea, vomiting, dysuria, hematuria, numbness, tingling, swelling. Denies further questions or concerns at this time.     A comprehensive ROS was performed and was negative except as detailed in the HPI above.     Physical Exam   Temp: 98.4  F (36.9  " C) Temp src: Oral BP: 109/70 Pulse: 75   Resp: 16 SpO2: 98 % O2 Device: None (Room air)    Vitals:    03/14/25 1012 03/15/25 0727 03/16/25 0710   Weight: 77.7 kg (171 lb 4.8 oz) 77 kg (169 lb 11.2 oz) 76.7 kg (169 lb 1.6 oz)     Vital Signs with Ranges  Temp:  [97.8  F (36.6  C)-98.8  F (37.1  C)] 98.4  F (36.9  C)  Pulse:  [71-84] 75  Resp:  [16-18] 16  BP: (103-132)/(54-80) 109/70  SpO2:  [96 %-100 %] 98 %  I/O last 3 completed shifts:  In: 477 [P.O.:240]  Out: 2050 [Urine:2050]      Physical Exam:    Blood pressure 109/70, pulse 75, temperature 98.4  F (36.9  C), temperature source Oral, resp. rate 16, height 1.626 m (5' 4\"), weight 76.7 kg (169 lb 1.6 oz), last menstrual period 01/01/2007, SpO2 98%, not currently breastfeeding.    Constitutional: Pleasant and cooperative female, in NAD. Alert, interactive, non-toxic. Smiling and conversational.  HEENT: NC/AT, sclera clear, conjunctiva normal, OP with MMM, no discrete intraoral lesions or thrush, poor dentition  Respiratory: No increased work of breathing, CTAB, no crackles or wheezing.  Cardiovascular: RRR, harsh blowing systolic murmur (I-II/VI) heard at the LUSB. No peripheral edema. No calf tenderness or palpable cords.  GI: Normal bowel sounds. Abdomen is soft, non-distended and non-tender to palpation.  Skin: Warm, dry, well-perfused. No bruising, bleeding, rashes, or lesions on limited exam.  Musculoskeletal: Diminished muscle bulk, no gross deformities.  Neurologic: A&O. Answers questions appropriately, speech normal. Moves all extremities spontaneously.  Psychiatric: Normal mood and affect.  Vascular access:  PICC on RUE CDI, non-tender, no surrounding erythema.    Medications   Current Facility-Administered Medications   Medication Dose Route Frequency Provider Last Rate Last Admin     Current Facility-Administered Medications   Medication Dose Route Frequency Provider Last Rate Last Admin    acyclovir (ZOVIRAX) tablet 400 mg  400 mg Oral BID " Chelsie Murphy PA-C   400 mg at 03/16/25 0807    famotidine (PEPCID) tablet 20 mg  20 mg Oral BID Zully Garcia PA-C   20 mg at 03/16/25 0807    fluticasone-vilanterol (BREO ELLIPTA) 100-25 MCG/ACT inhaler 1 puff  1 puff Inhalation Daily Chelsie Murphy PA-C   1 puff at 03/16/25 0807    heparin lock flush 10 unit/mL injection 5-20 mL  5-20 mL Intracatheter Q24H Chelsie Murphy PA-C   5 mL at 03/16/25 0504    heparin lock flush 10 unit/mL injection 5-20 mL  5-20 mL Intracatheter Q24H Chelsie Murphy PA-C   10 mL at 03/15/25 1947    levofloxacin (LEVAQUIN) tablet 500 mg  500 mg Oral Daily Zully Garcia PA-C   500 mg at 03/16/25 0807    [Held by provider] lisinopril (ZESTRIL) tablet 10 mg  10 mg Oral Daily Jyoti Tenorio PA-C   10 mg at 03/10/25 1039    loratadine (CLARITIN) tablet 10 mg  10 mg Oral Daily Chelsie Murphy PA-C   10 mg at 03/16/25 0806    pantoprazole (PROTONIX) EC tablet 40 mg  40 mg Oral QAM AC Chelsie Murphy PA-C   40 mg at 03/16/25 0806    PARoxetine (PAXIL) tablet 20 mg  20 mg Oral QAM Chelsie Murphy PA-C   20 mg at 03/16/25 0811    sodium chloride (PF) 0.9% PF flush 10-40 mL  10-40 mL Intracatheter Q8H Chelsie Murphy PA-C   10 mL at 03/14/25 1722    Vitamin D3 (CHOLECALCIFEROL) tablet 1,000 Units  1,000 Units Oral Daily Chelsie Murphy PA-C   1,000 Units at 03/16/25 0807    voriconazole (VFEND) tablet 200 mg  200 mg Oral Q12H [facility] (08/20) Chelsie Murphy PA-C   200 mg at 03/16/25 0807       Data   Results for orders placed or performed during the hospital encounter of 02/26/25 (from the past 24 hours)   CBC with platelets differential    Narrative    The following orders were created for panel order CBC with platelets differential.  Procedure                               Abnormality         Status                     ---------                               -----------          ------                     CBC with platelets and ...[[id number]]  Abnormal            Final result               RBC and Platelet Morpho...[[id number]]  Abnormal            Final result                 Please view results for these tests on the individual orders.   ABO/Rh type and screen    Narrative    The following orders were created for panel order ABO/Rh type and screen.  Procedure                               Abnormality         Status                     ---------                               -----------         ------                     Adult Type and Screen[[id number]]                           Final result                 Please view results for these tests on the individual orders.   Comprehensive metabolic panel   Result Value Ref Range    Sodium 135 135 - 145 mmol/L    Potassium 3.7 3.4 - 5.3 mmol/L    Carbon Dioxide (CO2) 21 (L) 22 - 29 mmol/L    Anion Gap 11 7 - 15 mmol/L    Urea Nitrogen 15.2 6.0 - 20.0 mg/dL    Creatinine 0.70 0.51 - 0.95 mg/dL    GFR Estimate >90 >60 mL/min/1.73m2    Calcium 8.8 8.8 - 10.4 mg/dL    Chloride 103 98 - 107 mmol/L    Glucose 116 (H) 70 - 99 mg/dL    Alkaline Phosphatase 120 40 - 150 U/L    AST 11 0 - 45 U/L    ALT 9 0 - 50 U/L    Protein Total 6.7 6.4 - 8.3 g/dL    Albumin 3.6 3.5 - 5.2 g/dL    Bilirubin Total 0.8 <=1.2 mg/dL   Magnesium   Result Value Ref Range    Magnesium 2.1 1.7 - 2.3 mg/dL   Lactate Dehydrogenase (aka LDH)   Result Value Ref Range    Lactate Dehydrogenase 231 0 - 250 U/L   Phosphorus   Result Value Ref Range    Phosphorus 3.8 2.5 - 4.5 mg/dL   Uric acid   Result Value Ref Range    Uric Acid 4.0 2.4 - 5.7 mg/dL   CBC with platelets and differential   Result Value Ref Range    WBC Count 0.9 (LL) 4.0 - 11.0 10e3/uL    RBC Count 2.33 (L) 3.80 - 5.20 10e6/uL    Hemoglobin 7.9 (L) 11.7 - 15.7 g/dL    Hematocrit 22.2 (L) 35.0 - 47.0 %    MCV 95 78 - 100 fL    MCH 33.9 (H) 26.5 - 33.0 pg    MCHC 35.6 31.5 - 36.5 g/dL    RDW 17.4 (H) 10.0 - 15.0 %     Platelet Count 8 (LL) 150 - 450 10e3/uL    % Neutrophils 16 %    % Lymphocytes 80 %    % Monocytes 5 %    % Eosinophils 0 %    % Basophils 0 %    % Immature Granulocytes 0 %    NRBCs per 100 WBC 0 <1 /100    Absolute Neutrophils 0.1 (LL) 1.6 - 8.3 10e3/uL    Absolute Lymphocytes 0.7 (L) 0.8 - 5.3 10e3/uL    Absolute Monocytes 0.0 0.0 - 1.3 10e3/uL    Absolute Eosinophils 0.0 0.0 - 0.7 10e3/uL    Absolute Basophils 0.0 0.0 - 0.2 10e3/uL    Absolute Immature Granulocytes 0.0 <=0.4 10e3/uL    Absolute NRBCs 0.0 10e3/uL   Adult Type and Screen   Result Value Ref Range    ABO/RH(D) A POS     Antibody Screen Negative Negative    SPECIMEN EXPIRATION DATE [id number]    RBC and Platelet Morphology   Result Value Ref Range    RBC Morphology Confirmed RBC Indices     Platelet Assessment  Automated Count Confirmed. Platelet morphology is normal.     Automated Count Confirmed. Platelet morphology is normal.    Reactive Lymphocytes Present (A) None Seen   CONDITIONAL Prepare pheresed platelets (unit)   Result Value Ref Range    Blood Component Type Platelets     Product Code [id number]     Unit Status Transfused     Unit Number [id number]     CODING SYSTEM [id number]     ISSUE DATE AND TIME [id number]     UNIT ABO/RH A+     UNIT TYPE ISBT 6200

## 2025-03-17 LAB
ALBUMIN SERPL BCG-MCNC: 3.5 G/DL (ref 3.5–5.2)
ALP SERPL-CCNC: 113 U/L (ref 40–150)
ALT SERPL W P-5'-P-CCNC: 8 U/L (ref 0–50)
ANION GAP SERPL CALCULATED.3IONS-SCNC: 9 MMOL/L (ref 7–15)
APTT PPP: 35 SECONDS (ref 22–38)
AST SERPL W P-5'-P-CCNC: 10 U/L (ref 0–45)
BASOPHILS # BLD AUTO: 0 10E3/UL (ref 0–0.2)
BASOPHILS NFR BLD AUTO: 0 %
BILIRUB SERPL-MCNC: 0.6 MG/DL
BLD PROD TYP BPU: NORMAL
BLOOD COMPONENT TYPE: NORMAL
BUN SERPL-MCNC: 11.1 MG/DL (ref 6–20)
CALCIUM SERPL-MCNC: 8.8 MG/DL (ref 8.8–10.4)
CHLORIDE SERPL-SCNC: 102 MMOL/L (ref 98–107)
CODING SYSTEM: NORMAL
CREAT SERPL-MCNC: 0.62 MG/DL (ref 0.51–0.95)
CROSSMATCH: NORMAL
EGFRCR SERPLBLD CKD-EPI 2021: >90 ML/MIN/1.73M2
EOSINOPHIL # BLD AUTO: 0 10E3/UL (ref 0–0.7)
EOSINOPHIL NFR BLD AUTO: 4 %
ERYTHROCYTE [DISTWIDTH] IN BLOOD BY AUTOMATED COUNT: 16.9 % (ref 10–15)
FIBRINOGEN PPP-MCNC: 350 MG/DL (ref 170–510)
GLUCOSE SERPL-MCNC: 108 MG/DL (ref 70–99)
HCO3 SERPL-SCNC: 23 MMOL/L (ref 22–29)
HCT VFR BLD AUTO: 18.7 % (ref 35–47)
HGB BLD-MCNC: 6.8 G/DL (ref 11.7–15.7)
IMM GRANULOCYTES # BLD: 0 10E3/UL
IMM GRANULOCYTES NFR BLD: 2 %
INR PPP: 1.29 (ref 0.85–1.15)
ISSUE DATE AND TIME: NORMAL
LACTATE SERPL-SCNC: 1.1 MMOL/L (ref 0.7–2)
LDH SERPL L TO P-CCNC: 203 U/L (ref 0–250)
LYMPHOCYTES # BLD AUTO: 0.5 10E3/UL (ref 0.8–5.3)
LYMPHOCYTES NFR BLD AUTO: 89 %
MAGNESIUM SERPL-MCNC: 2 MG/DL (ref 1.7–2.3)
MCH RBC QN AUTO: 34.7 PG (ref 26.5–33)
MCHC RBC AUTO-ENTMCNC: 36.4 G/DL (ref 31.5–36.5)
MCV RBC AUTO: 95 FL (ref 78–100)
MONOCYTES # BLD AUTO: 0 10E3/UL (ref 0–1.3)
MONOCYTES NFR BLD AUTO: 4 %
NEUTROPHILS # BLD AUTO: 0 10E3/UL (ref 1.6–8.3)
NEUTROPHILS NFR BLD AUTO: 2 %
NRBC # BLD AUTO: 0 10E3/UL
NRBC BLD AUTO-RTO: 0 /100
PHOSPHATE SERPL-MCNC: 3.5 MG/DL (ref 2.5–4.5)
PLAT MORPH BLD: ABNORMAL
PLATELET # BLD AUTO: 43 10E3/UL (ref 150–450)
POTASSIUM SERPL-SCNC: 3.7 MMOL/L (ref 3.4–5.3)
PROT SERPL-MCNC: 6.3 G/DL (ref 6.4–8.3)
RBC # BLD AUTO: 1.96 10E6/UL (ref 3.8–5.2)
RBC MORPH BLD: ABNORMAL
SODIUM SERPL-SCNC: 134 MMOL/L (ref 135–145)
UNIT ABO/RH: NORMAL
UNIT NUMBER: NORMAL
UNIT STATUS: NORMAL
UNIT TYPE ISBT: 6200
URATE SERPL-MCNC: 3.5 MG/DL (ref 2.4–5.7)
VARIANT LYMPHS BLD QL SMEAR: PRESENT
WBC # BLD AUTO: 0.5 10E3/UL (ref 4–11)

## 2025-03-17 PROCEDURE — 85004 AUTOMATED DIFF WBC COUNT: CPT

## 2025-03-17 PROCEDURE — 85730 THROMBOPLASTIN TIME PARTIAL: CPT | Performed by: PHYSICIAN ASSISTANT

## 2025-03-17 PROCEDURE — P9016 RBC LEUKOCYTES REDUCED: HCPCS

## 2025-03-17 PROCEDURE — 83735 ASSAY OF MAGNESIUM: CPT

## 2025-03-17 PROCEDURE — 120N000002 HC R&B MED SURG/OB UMMC

## 2025-03-17 PROCEDURE — 85384 FIBRINOGEN ACTIVITY: CPT | Performed by: PHYSICIAN ASSISTANT

## 2025-03-17 PROCEDURE — 85041 AUTOMATED RBC COUNT: CPT

## 2025-03-17 PROCEDURE — 83615 LACTATE (LD) (LDH) ENZYME: CPT

## 2025-03-17 PROCEDURE — 82247 BILIRUBIN TOTAL: CPT

## 2025-03-17 PROCEDURE — 84132 ASSAY OF SERUM POTASSIUM: CPT

## 2025-03-17 PROCEDURE — 83605 ASSAY OF LACTIC ACID: CPT | Performed by: HOSPITALIST

## 2025-03-17 PROCEDURE — 84550 ASSAY OF BLOOD/URIC ACID: CPT | Performed by: PHYSICIAN ASSISTANT

## 2025-03-17 PROCEDURE — 250N000013 HC RX MED GY IP 250 OP 250 PS 637

## 2025-03-17 PROCEDURE — 85610 PROTHROMBIN TIME: CPT | Performed by: PHYSICIAN ASSISTANT

## 2025-03-17 PROCEDURE — 99233 SBSQ HOSP IP/OBS HIGH 50: CPT | Mod: FS

## 2025-03-17 PROCEDURE — 250N000011 HC RX IP 250 OP 636

## 2025-03-17 PROCEDURE — 84100 ASSAY OF PHOSPHORUS: CPT | Performed by: PHYSICIAN ASSISTANT

## 2025-03-17 RX ORDER — POSACONAZOLE 100 MG/1
300 TABLET, DELAYED RELEASE ORAL EVERY MORNING
Status: DISCONTINUED | OUTPATIENT
Start: 2025-03-18 | End: 2025-04-06 | Stop reason: HOSPADM

## 2025-03-17 RX ADMIN — ACYCLOVIR 400 MG: 400 TABLET ORAL at 08:03

## 2025-03-17 RX ADMIN — HYDROXYZINE HYDROCHLORIDE 25 MG: 25 TABLET, FILM COATED ORAL at 10:31

## 2025-03-17 RX ADMIN — PAROXETINE HYDROCHLORIDE 20 MG: 20 TABLET, FILM COATED ORAL at 08:03

## 2025-03-17 RX ADMIN — FLUTICASONE FUROATE AND VILANTEROL TRIFENATATE 1 PUFF: 100; 25 POWDER RESPIRATORY (INHALATION) at 08:05

## 2025-03-17 RX ADMIN — LEVOFLOXACIN 500 MG: 500 TABLET, FILM COATED ORAL at 08:03

## 2025-03-17 RX ADMIN — ACETAMINOPHEN 650 MG: 325 TABLET, FILM COATED ORAL at 10:31

## 2025-03-17 RX ADMIN — Medication 5 ML: at 08:03

## 2025-03-17 RX ADMIN — LORATADINE 10 MG: 10 TABLET ORAL at 08:03

## 2025-03-17 RX ADMIN — FAMOTIDINE 20 MG: 20 TABLET, FILM COATED ORAL at 08:03

## 2025-03-17 RX ADMIN — ACETAMINOPHEN 650 MG: 325 TABLET, FILM COATED ORAL at 17:47

## 2025-03-17 RX ADMIN — ACETAMINOPHEN 650 MG: 325 TABLET, FILM COATED ORAL at 03:32

## 2025-03-17 RX ADMIN — HYDROXYZINE HYDROCHLORIDE 25 MG: 25 TABLET, FILM COATED ORAL at 22:05

## 2025-03-17 RX ADMIN — FAMOTIDINE 20 MG: 20 TABLET, FILM COATED ORAL at 19:56

## 2025-03-17 RX ADMIN — VORICONAZOLE 200 MG: 200 TABLET, FILM COATED ORAL at 08:03

## 2025-03-17 RX ADMIN — ACYCLOVIR 400 MG: 400 TABLET ORAL at 19:56

## 2025-03-17 RX ADMIN — PANTOPRAZOLE SODIUM 40 MG: 40 TABLET, DELAYED RELEASE ORAL at 08:03

## 2025-03-17 RX ADMIN — Medication 1000 UNITS: at 08:03

## 2025-03-17 ASSESSMENT — ACTIVITIES OF DAILY LIVING (ADL)
ADLS_ACUITY_SCORE: 35
ADLS_ACUITY_SCORE: 35
ADLS_ACUITY_SCORE: 38
ADLS_ACUITY_SCORE: 35
ADLS_ACUITY_SCORE: 35
ADLS_ACUITY_SCORE: 38
ADLS_ACUITY_SCORE: 38
ADLS_ACUITY_SCORE: 35
ADLS_ACUITY_SCORE: 40
ADLS_ACUITY_SCORE: 38
ADLS_ACUITY_SCORE: 35
ADLS_ACUITY_SCORE: 35
ADLS_ACUITY_SCORE: 40
ADLS_ACUITY_SCORE: 38
ADLS_ACUITY_SCORE: 40
ADLS_ACUITY_SCORE: 35
ADLS_ACUITY_SCORE: 40
ADLS_ACUITY_SCORE: 38
ADLS_ACUITY_SCORE: 35
ADLS_ACUITY_SCORE: 38
ADLS_ACUITY_SCORE: 38

## 2025-03-17 NOTE — PROGRESS NOTES
"Notified that patient reported \"seeing all blue for roughly 10min about an hour ago\". RN was just notified and patient said symptoms already resolved    On my interview, pt states that for about <10minutes ~ an hour ago, she was seeing things with a blue tinge. No associated vision loss or blurriness. Example, her nurses hair looked blue-tinged, but the rest of her face looked a normal color.   She's had this happen when she had a pinched nerve in her neck in the past and she thinks that could be playing a role again.   On my exam, EOMI intact, pupils equal to light and no visual field defect  Plan  Continue to monitor clinically  "

## 2025-03-17 NOTE — PLAN OF CARE
[id number]     Activity:   independent  LDA's:  PICC   Vitals: VSS on RA  Pain/Neuro: A&Ox4, Denies nausea, headache 4/10 managed with PRN tylenol.  Cardiac: WDL  Respiratory: WDL  GI/: Voids spontaneously without difficulty  Diet: Regular and tolerating well  Skin: No new concerns     Labs/imaging: Hgb 6.8 this am and 1 unit RBC infused.      Other Cares/Comments: Pt able to rest between cares.      BMBx scheduled 3/19 @ 1100     Continue with POC    Goal Outcome Evaluation:      Plan of Care Reviewed With: patient    Overall Patient Progress: no changeOverall Patient Progress: no change    Outcome Evaluation: C1D13 7+3

## 2025-03-17 NOTE — PROGRESS NOTES
[facility]    Hematology / Oncology Progress Note  Hospital Day # 19  Date of Service (when I saw the patient): 03/17/2025     Assessment & Plan   Alejandra Villegas is a 59 year old female with a past medical history significant for COPD, CKD, migraines, rheumatoid arthritis, aortic stenosis, Afib, CHF, and anxiety who presented to an outside hospital with cytopenias and was found on BMBx to have hypercellular marrow with 4% blasts, 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 She was subsequently admitted to [facility] on 2/26/25 for further work-up and management and pathology re-review was most consistent with AML with NPM1 mutation by WHO 2022 (which does not require a specific blast threshold). Following further multidisciplinary discussion, she started intensive induction with 7+3 (D1=3/5/25).  Her course has been complicated by neutropenic fevers, influenza A, AOM, and L TM perforation.     Today:  - D13 from 7+3 induction.   - Midcycle marrow scheduled 3/19 @ 1100, orders to be placed.   - Hgb 6.8 on AM labs. 1 unit RBCs transfused.  - Visual hallucinations over night; believed her black shoes and the nurse's dark-colored hair were blue. Neurologically intact, resolved without intervention and without recurrence. Concerning to patient; suspect medication-induced. Will transition from Voriconazole to Posaconazole x3/18.  - Continue with best supportive cares.     HEME  # AML with NPM1 mutation  Had been seen by PCP Dec 2024 w/ progressive fatigue and nausea where she was found leukopenic WBC  2.4 and neutropenic w/ ANC 0.3. Hgb 12. Was referred to local hematology/oncology clinic for further evaluation and seen by Dr. Harris. BMBx 2/10/25 done at OSH showed hypercellular marrow w/ trilineage hematopoiesis w/ dyspoiesis with mildly elevated blasts of 4% on morphology. NGS w/ NPM1, IDH2, and NRAS mutations. She was admitted to [facility] 2/26/25 for further  workup and management. Slides were re-reviewed by [facility], who noted hypercellular marrow with 8% blasts by morphology. Despite relatively low blast percentage, findings were felt most consistent with a diagnosis AML with NPM1 mutation by WHO 2022 (which does not require a specific blast threshold). Following interdepartmental discussions and review of the available literature, patient was started on intensive induction with 7+3 (Day 1=3/5/25).   - PICC placed 3/5/2025, plan to discontinue on discharge.   - Baseline cardiopulmonary studies:  - Echo w/ EF 60-65%, mild known aortic stenosis.   - EKG (2/27) with NSR, QTc 412  - CXR (2/26) with mildly increased streaky bibasilar opacities, atelectasis or edema. No consolidation.   - Baseline viral serologies: CMV IgG+, EBV IgG+, HepB sAg-, HepB cAb-, HepB sAb-, HSV1+/2+, HIV-  - HLA Typing sent on admission. Message sent to notify BMT team x2/27 for IP consult.   - Midcycle marrow scheduled 3/19 @ 1100, orders to be placed closer to date.        Treatment plan: 7+3 (C1D1=3/5/2025)    - Daunorubicin 60 mg/m  IV D1-3    - Cytarabine 100 mg/m  IV D1-7      - Pre-medications: zofran, dexamethasone 12 mg D1-3     # Pancytopenia  Secondary to underlying hematologic malignancy and exacerbated by recent chemotherapy.  - Follow daily CBC with differential  - Transfuse to keep Hgb >7, plt >10K  - Blood consent obtained 2/26/25 on admission and placed in patient's paper chart.      # Risk for TLS  # Hyperphosphatemia, resolved  Secondary to hematologic malignancy, no evidence of TLS on admission.   - Allopurinol 300 mg daily x7 days (through 3/11)  - Follow TLS labs daily    # Risk for DIC  Secondary to underlying hematologic malignancy.  - Follow coags every MWF  - Transfuse cryo to keep fibrinogen >100, FFP to keep INR <1.8     ID  # ID prophylaxis  - Acyclovir 400 mg BID  - Levaquin 500 mg daily  - Posaconazole 300 mg daily   - Vori w/ $1.60 copay, posa requiring  PA also w/ $1.60 copay. Rotated from deny to vori (3/13-3/17) w/ completion of chemotherapy, then transitioned to posa x3/18 given visual hallucinations x3/17     # Neutropenic fever, resolved  # Influenza A, resolved  3/13: Febrile 100.4 overnight x3/13. No localizing/new symptoms. OVSS. Patient was wearing heated jacket that she attributed to temperature, no documentation noted of notifying cross cover MD. No further ID workup (BCx, UA, UC, or CXR) or broadening of abx completed. Given prolonged period on AM chart check x3/14 since documented fever and patient has remained afebrile and OVSS, will defer further workup/initiation of abx.   - If refevers, then would obtain ID workup w/ BCx2, UA, UC, CXR, and broaden abx to cefepime.   2/25: On admission, patient noted subjective fever with chills and rhinitis beginning 2/25 PM prior to admission. No other localizing s/sx of infection. She was afebrile on admission but spiked a low-grade fever to 100.7 on 2/26 PM. Blood and urine cultures negative, CXR with streaky opacities but no burt consolidation. Work-up positive for influenza A, and she completed a course of Tamiflu 75 mg BID x5 days (2/26-3/3). She also received a short empiric course of IV cefepime (2/26-3/1) given concurrent neutropenia.  - Monitor for recurrent fevers    # R AOM with purulent effusion, resolved  # Small L TM perforation, resolved  Patient noted B/L ear pain, L>R s/p drainage from R side on 3/3. Noted h/o recurrent AOMs. On exam, TMs appear full with erythema to outer edges, patient noted pain w/ exam though able to tolerate. Small TM perforation noted to L side. Query if perforation from recurrent AOM or 2/2 congestion and fluid from recent influenza.  - ENT consulted, appreciate recs:  - Recommended 7-day course Augmentin (per cross-cover discussion with pharmacy, given h/o allergic reaction to Augmentin, increased dose of levofloxacin from 500 mg ? 750 mg daily (3/5-3/11) then plan to  deescalate to ppx levaquin 500 mg daily.   - 5 day course of floxin drops to left ear for perforation (3/3-3/7)  - Follow up w/ PCP for left TM perforation - if persistent in 6-8 weeks recommend outpt ENT referral.     GI/  # GERD, improving  # Epigastric pain, resolved  Reported recent increase in symptoms in the days leading up to admission and initiated famotidine.   - 3/9: Patient reported onset of midsternal chest pain that felt like reflux symptoms shortly after eating lunch.  EKG NSR.  States on 3/8, she experienced episode of emesis shortly after eating without preceding symptoms, no nausea at the time.  States this has happened before on occasion and has a history of this happening in the past.  No radiation of pain.  She also wondered if this may be related to the nicotine patch which she removed as she was also experiencing tachycardia, though had also been drinking energy drinks which she is trying to cut back on.  Pain is not reproducible on exam.  Also feels anxiety is attributing to some of her symptoms.  - 3/14: Ongoing GERD symptoms w/ epigastric pain, dull pain localized to epigastric region, worsened w/ N/V and anxiety. Rates 2-3/10, no TTP or reproducible symptoms. Denies melena, dark tarry stools, hematemesis w/ emetic episodes x3/13, or other sx c/f UGIB. Of note, AM Hgb 6.1, will transfuse 1 unit pRBCs and follow for recheck. Add daily PPI to regimen. If persistent/worsening then could consider GI consult.   - Increase famotidine 10 mg BID ? 20 mg BID x3/9.   - Protonix 40 mg daily x3/14   - Continue to encourage lifestyle modifications  - TUMS prn  - SLP consulted 3/10 - Ok for regular diet w/ thin liquids    # Nausea/vomiting, recurrent  Patient noted ongoing nausea w/ occasional vomiting starting Dec 2024. Has been managed with PRN Zofran.  Endorsed nausea on admission, now resolved.   - PRN Zofran and compazine  - Could consider trial of PRN Zyprexa if 3rd agent is desired/required     #  "Urinary frequency, resolved  # Dysuria, resolved  On admission, patient noted longstanding history of urinary frequency though new mild dysuria. Denies hematuria, bladder tenderness of CVA tenderness. UA (2/26) negative, UC with no growth. Symptoms have since resolved.     PULM  # COPD  Longstanding history of COPD. On admission patient reports symptoms are manageable and no recent exacerbations. Most recent PFTs (2018) were normal.  - Continue PTA Breo Ellipta inhaler and PRN DuoNebs      CV  #Non-radiating chest pain, resolved  Patient noted new non-radiating mid-sternal chest pain with associated reflux-like symptoms with anxiety x3/9 after eating lunch. EKG showed NSR. Noted x3/8, experiencing episode of emesis shortly after eating without preceding symptoms, no nausea at the time, and similar mid sternal discomfort that felt like her \"esophagus\".  States this has happened before on occasion and has a history of this happening in the past.  She also wondered if this may be related to the nicotine patch which she removed as she was also experiencing tachycardia, though had also been drinking energy drinks which she is trying to cut back on.  Pain not reproducible on exam.  Also feels anxiety is attributing to some of her symptoms as detailed below.     # Essential hypertension  - Continue PTA lisinopril - HELD 3/11 w/ soft BPs (90s-100s/50s-60s), resume pending daily trends     # Mixed hyperlipidemia  Lipid panel has remained at goal, 1/14/25 panel w/ cholesterol 163, , LDL 92, HDL 45.   - Held PTA atorvastatin on admission due to chemotherapy interactions; will also likely have DDI with azole once started. Consider rotation to rosuvastatin for better drug-drug interaction profile.     # H/o aortic stenosis  Patient noted on admission longstanding history of aortic stenosis. Pre-chemo echo w/ EF 60-65% and mild aortic stenosis and insufficiency. No previous echo to compare.      RENAL/FEN  # Stage 2 " CKD  Baseline Cr ~ 0.7. On admission Cr 0.77.  - Continue to trend on daily labs and encourage PO hydration     # Mild hyponatremia, resolved  New mild hyponatremia noted 3/13 with Na 135 ? 130. Asymptomatic. Trial 1 L NS. If persistent/worsening then could consider initiation of salt tabs, hypertonic saline, and would obtain further workup with urine lytes.     NEURO  # Degenerative disc disease, L5-S1  - Continue PTA gabapentin     # Chronic migraine w/o aura  Present since childhood. Reportedly triggered by neck manipulation/movement. Reportedly well-managed with regimen below.  - Continue PTA sumatriptan and cyclobenzaprine PRN     ENDO  # Prediabetes  Last A1c 6.0 x12/9/24. Has been managing with lifestyle modifications.      MISC  # Rheumatoid arthritis   Patient reported trying treatment though was unable to tolerate well. Managed with Tylenol PRN. Most recent RF >650 (2/10/25). JI negative.       # BROOKS  # MDD  # At risk for adjustment disorder  Patient reports good control of anxiety/depressive symptoms prior to admission. Did note feeling overwhelmed by new diagnosis. On admission discussed inpatient services such as health psychology or cordelia, she respectfully declined though will consider. Ongoing discussion regarding symptoms of anxiety and depression continued throughout admission; patient endorses continuous chemo limiting accessibility to previously frequent trips outside has affected symptoms, feeling hopeful w/ cytarabine to complete tomorrow PM. Discussed increase in dose of Paxil and further d/w health psych; she declined at this time. C/w prn atarax and Paxil.   - Readdress health psych consult as indicated  - Continue PTA paroxetine   - Atarax as needed     # Vitamin D deficiency  Continue PTA vit D supplement    # Seasonal allergies   - Claritin 10 mg daily     # Tobacco use   Has smoked since age 16, 1.5 ppd (roughly 65 pack years). Attempting to cut back as recently as 01/2025 to 0.5 ppd.  "We discussed the risks of smoking during induction chemotherapy including increased risk for infection in setting of severe neutropenia that could further complicate course, even leading to death. She expressed understanding and appreciation of conversation.   - Encourage smoking cessation, readdress NRT as needed.   - Nicotine patch ordered 2/28. Patient had been intermittently declining patch and reported associated increased symptoms of anxiety, and felt like patch precipitated anxiety attack w/ chest pain on 3/9. Discontinued 3/11, offered trial of lower dose patch or alternative NRT that patient declined.    - Discussed option of smoking cessation team, patient declined at this time.    Fluids/Electrolytes/Nutrition   - IVF prn   - PRN lyte replacement per standing protocol  - Regular diet as tolerated     Lines: PICC    PPX  VTE: TCP, Lovenox held 3/13  GI: Pepcid, Protonix  Bowels: prn senna and miralax  Activity: as tolerated    Code: DNR/DNI    Medically Ready for Discharge: Anticipated in 5+ Days    Disposition: Admitted for further workup and management. Discharge pending treatment decisions and clinical course.   Follow-up: Will need to request APPT18 closer to discharge date and determine primary oncologist.     45 MINUTES SPENT BY ME on the date of service doing chart review, history, exam, documentation & further activities per the note.      Staffed with Dr. Moreno.    Andrey Crowell PA-C  Hematology/Oncology  Pager [phone]    Interval History   No acute overnight events. Nursing notes reviewed.      Alejandra is feeling well this morning. She endorses sadness regarding time away from her dogs, provided support. She also notes understanding of the value of her hospitalization given her immunocompromised status. Reviewed plan for bone marrow biopsy on Wednesday. She states that her sister will come and support her that day. Discussed \"blue vision\" overnight, lasted about 5 minutes and resolved without " "intervention. She states that her black shoes and the nurse's dark-colored hair appeared blue. Neurologically intact, no recurrence or residual symptoms. Denies chest pain, shortness of breath, headache, nausea/vomiting/diarrhea, peripheral edema, skin changes. Last BM this morning. Appetite intact, prefers to eat multiple small meals throughout the day given reflux picture. Improvement with addition of Protonix. All concerns were addressed and questions were answered.      A comprehensive ROS was performed and was negative except as detailed in the HPI above.     Physical Exam   Temp: 98.5  F (36.9  C) Temp src: Oral BP: 111/67 Pulse: 77   Resp: 17 SpO2: 98 % O2 Device: None (Room air)    Vitals:    03/15/25 0727 03/16/25 0710 03/17/25 0759   Weight: 77 kg (169 lb 11.2 oz) 76.7 kg (169 lb 1.6 oz) 77.4 kg (170 lb 11.2 oz)     Vital Signs with Ranges  Temp:  [98.1  F (36.7  C)-98.6  F (37  C)] 98.5  F (36.9  C)  Pulse:  [72-85] 77  Resp:  [16-18] 17  BP: (107-125)/(54-78) 111/67  SpO2:  [96 %-100 %] 98 %  I/O last 3 completed shifts:  In: 1133 [P.O.:570; I.V.:20]  Out: 1325 [Urine:1325]    Physical Exam:    Blood pressure 111/67, pulse 77, temperature 98.5  F (36.9  C), temperature source Oral, resp. rate 17, height 1.626 m (5' 4\"), weight 77.4 kg (170 lb 11.2 oz), last menstrual period 01/01/2007, SpO2 98%, not currently breastfeeding.    Constitutional: Pleasant and cooperative female, in NAD. Alert, interactive, non-toxic. Smiling and conversational.  HEENT: NC/AT, sclera clear, conjunctiva normal, OP with MMM, no discrete intraoral lesions or thrush, poor dentition  Respiratory: No increased work of breathing, CTAB, no crackles or wheezing.  Cardiovascular: RRR, systolic murmur. No peripheral edema. No calf tenderness or palpable cords.  GI: Normal bowel sounds. Abdomen is soft, non-distended and non-tender to palpation.  Skin: Warm, dry, well-perfused. No bruising, bleeding, rashes, or lesions on limited " exam.  Musculoskeletal: Diminished muscle bulk, no gross deformities.  Neurologic: A&O. Answers questions appropriately, speech normal. Moves all extremities spontaneously.  Psychiatric: Normal mood and affect.  Vascular access:  PICC on RUE CDI, non-tender, no surrounding erythema.    Medications   Current Facility-Administered Medications   Medication Dose Route Frequency Provider Last Rate Last Admin     Current Facility-Administered Medications   Medication Dose Route Frequency Provider Last Rate Last Admin    acyclovir (ZOVIRAX) tablet 400 mg  400 mg Oral BID Chelsie Murphy PA-C   400 mg at 03/17/25 0803    famotidine (PEPCID) tablet 20 mg  20 mg Oral BID Zully Garcia PA-C   20 mg at 03/17/25 0803    fluticasone-vilanterol (BREO ELLIPTA) 100-25 MCG/ACT inhaler 1 puff  1 puff Inhalation Daily Chelsie Murphy PA-C   1 puff at 03/17/25 0805    heparin lock flush 10 unit/mL injection 5-20 mL  5-20 mL Intracatheter Q24H Chelsie Murphy PA-C   5 mL at 03/17/25 0803    levofloxacin (LEVAQUIN) tablet 500 mg  500 mg Oral Daily Zully Garcia PA-C   500 mg at 03/17/25 0803    [Held by provider] lisinopril (ZESTRIL) tablet 10 mg  10 mg Oral Daily Jyoti Tenorio PA-C   10 mg at 03/10/25 1039    loratadine (CLARITIN) tablet 10 mg  10 mg Oral Daily Chelsie Murphy PA-C   10 mg at 03/17/25 0803    pantoprazole (PROTONIX) EC tablet 40 mg  40 mg Oral QAM AC Chelsie Murphy PA-C   40 mg at 03/17/25 0803    PARoxetine (PAXIL) tablet 20 mg  20 mg Oral PEDROM Chelsie Murphy PA-C   20 mg at 03/17/25 0803    sodium chloride (PF) 0.9% PF flush 10-40 mL  10-40 mL Intracatheter Q8H Chelsie Murphy PA-C   10 mL at 03/17/25 0807    Vitamin D3 (CHOLECALCIFEROL) tablet 1,000 Units  1,000 Units Oral Daily Chelsie Murphy PA-C   1,000 Units at 03/17/25 0803    voriconazole (VFEND) tablet 200 mg  200 mg Oral Q12H [facility] (08/20) Jeffrey,  DESEAN Holguin   200 mg at 03/17/25 0803     Data   Results for orders placed or performed during the hospital encounter of 02/26/25 (from the past 24 hours)   CBC with platelets differential    Narrative    The following orders were created for panel order CBC with platelets differential.  Procedure                               Abnormality         Status                     ---------                               -----------         ------                     CBC with platelets and ...[[id number]]  Abnormal            Final result               RBC and Platelet Morpho...[[id number]]  Abnormal            Final result                 Please view results for these tests on the individual orders.   Comprehensive metabolic panel   Result Value Ref Range    Sodium 134 (L) 135 - 145 mmol/L    Potassium 3.7 3.4 - 5.3 mmol/L    Carbon Dioxide (CO2) 23 22 - 29 mmol/L    Anion Gap 9 7 - 15 mmol/L    Urea Nitrogen 11.1 6.0 - 20.0 mg/dL    Creatinine 0.62 0.51 - 0.95 mg/dL    GFR Estimate >90 >60 mL/min/1.73m2    Calcium 8.8 8.8 - 10.4 mg/dL    Chloride 102 98 - 107 mmol/L    Glucose 108 (H) 70 - 99 mg/dL    Alkaline Phosphatase 113 40 - 150 U/L    AST 10 0 - 45 U/L    ALT 8 0 - 50 U/L    Protein Total 6.3 (L) 6.4 - 8.3 g/dL    Albumin 3.5 3.5 - 5.2 g/dL    Bilirubin Total 0.6 <=1.2 mg/dL   Magnesium   Result Value Ref Range    Magnesium 2.0 1.7 - 2.3 mg/dL   Lactate Dehydrogenase (aka LDH)   Result Value Ref Range    Lactate Dehydrogenase 203 0 - 250 U/L   Fibrinogen activity   Result Value Ref Range    Fibrinogen Activity 350 170 - 510 mg/dL   INR   Result Value Ref Range    INR 1.29 (H) 0.85 - 1.15   Partial thromboplastin time   Result Value Ref Range    aPTT 35 22 - 38 Seconds   Phosphorus   Result Value Ref Range    Phosphorus 3.5 2.5 - 4.5 mg/dL   Uric acid   Result Value Ref Range    Uric Acid 3.5 2.4 - 5.7 mg/dL   CBC with platelets and differential   Result Value Ref Range    WBC Count 0.5 (LL) 4.0 - 11.0 10e3/uL     RBC Count 1.96 (L) 3.80 - 5.20 10e6/uL    Hemoglobin 6.8 (LL) 11.7 - 15.7 g/dL    Hematocrit 18.7 (L) 35.0 - 47.0 %    MCV 95 78 - 100 fL    MCH 34.7 (H) 26.5 - 33.0 pg    MCHC 36.4 31.5 - 36.5 g/dL    RDW 16.9 (H) 10.0 - 15.0 %    Platelet Count 43 (LL) 150 - 450 10e3/uL    % Neutrophils 2 %    % Lymphocytes 89 %    % Monocytes 4 %    % Eosinophils 4 %    % Basophils 0 %    % Immature Granulocytes 2 %    NRBCs per 100 WBC 0 <1 /100    Absolute Neutrophils 0.0 (LL) 1.6 - 8.3 10e3/uL    Absolute Lymphocytes 0.5 (L) 0.8 - 5.3 10e3/uL    Absolute Monocytes 0.0 0.0 - 1.3 10e3/uL    Absolute Eosinophils 0.0 0.0 - 0.7 10e3/uL    Absolute Basophils 0.0 0.0 - 0.2 10e3/uL    Absolute Immature Granulocytes 0.0 <=0.4 10e3/uL    Absolute NRBCs 0.0 10e3/uL   RBC and Platelet Morphology   Result Value Ref Range    RBC Morphology Confirmed RBC Indices     Platelet Assessment  Automated Count Confirmed. Platelet morphology is normal.     Automated Count Confirmed. Platelet morphology is normal.    Reactive Lymphocytes Present (A) None Seen   CONDITIONAL Prepare red blood cells (unit)   Result Value Ref Range    Blood Component Type Red Blood Cells     Product Code [id number]     Unit Status Transfused     Unit Number [id number]     CROSSMATCH Compatible     CODING SYSTEM [id number]     ISSUE DATE AND TIME [id number]     UNIT ABO/RH A+     UNIT TYPE ISBT 6200    Lactic Acid Whole Blood w/ 1x repeat in 2 hrs when >2   Result Value Ref Range    Lactic Acid, Initial 1.1 0.7 - 2.0 mmol/L

## 2025-03-17 NOTE — PLAN OF CARE
Goal Outcome Evaluation:      Plan of Care Reviewed With: patient    Overall Patient Progress: no changeOverall Patient Progress: no change    Outcome Evaluation: C1D13 7+3    [id number]: Alejandra is A&Ox4 with VSS on RA. No complaints of SOB. Intermittent N managed with small meals. No medication needed. No emesis. Atarax x1. Endorsed HA, given tylenol x2 with relief. Triggered sepsis, LA 1.1. UAL, walking halls and outside. Resting throughout shift. Able to make needs known. Continue with POC.

## 2025-03-18 ENCOUNTER — APPOINTMENT (OUTPATIENT)
Dept: GENERAL RADIOLOGY | Facility: CLINIC | Age: 60
End: 2025-03-18
Attending: STUDENT IN AN ORGANIZED HEALTH CARE EDUCATION/TRAINING PROGRAM
Payer: MEDICARE

## 2025-03-18 LAB
ALBUMIN SERPL BCG-MCNC: 3.5 G/DL (ref 3.5–5.2)
ALBUMIN UR-MCNC: 20 MG/DL
ALP SERPL-CCNC: 112 U/L (ref 40–150)
ALT SERPL W P-5'-P-CCNC: 9 U/L (ref 0–50)
ANION GAP SERPL CALCULATED.3IONS-SCNC: 13 MMOL/L (ref 7–15)
APPEARANCE UR: ABNORMAL
AST SERPL W P-5'-P-CCNC: 10 U/L (ref 0–45)
BASOPHILS # BLD AUTO: 0 10E3/UL (ref 0–0.2)
BASOPHILS NFR BLD AUTO: 0 %
BILIRUB SERPL-MCNC: 0.7 MG/DL
BILIRUB UR QL STRIP: NEGATIVE
BUN SERPL-MCNC: 11.4 MG/DL (ref 6–20)
CALCIUM SERPL-MCNC: 8.8 MG/DL (ref 8.8–10.4)
CHLORIDE SERPL-SCNC: 102 MMOL/L (ref 98–107)
COLOR UR AUTO: YELLOW
CREAT SERPL-MCNC: 0.63 MG/DL (ref 0.51–0.95)
EGFRCR SERPLBLD CKD-EPI 2021: >90 ML/MIN/1.73M2
EOSINOPHIL # BLD AUTO: 0 10E3/UL (ref 0–0.7)
EOSINOPHIL NFR BLD AUTO: 0 %
ERYTHROCYTE [DISTWIDTH] IN BLOOD BY AUTOMATED COUNT: 17.2 % (ref 10–15)
FLUAV RNA SPEC QL NAA+PROBE: NEGATIVE
FLUBV RNA RESP QL NAA+PROBE: NEGATIVE
GLUCOSE SERPL-MCNC: 116 MG/DL (ref 70–99)
GLUCOSE UR STRIP-MCNC: NEGATIVE MG/DL
HCO3 SERPL-SCNC: 20 MMOL/L (ref 22–29)
HCT VFR BLD AUTO: 21.7 % (ref 35–47)
HGB BLD-MCNC: 7.7 G/DL (ref 11.7–15.7)
HGB UR QL STRIP: ABNORMAL
IMM GRANULOCYTES # BLD: 0 10E3/UL
IMM GRANULOCYTES NFR BLD: 0 %
KETONES UR STRIP-MCNC: NEGATIVE MG/DL
LACTATE SERPL-SCNC: 0.7 MMOL/L (ref 0.7–2)
LACTATE SERPL-SCNC: 0.9 MMOL/L (ref 0.7–2)
LDH SERPL L TO P-CCNC: 191 U/L (ref 0–250)
LEUKOCYTE ESTERASE UR QL STRIP: NEGATIVE
LYMPHOCYTES # BLD AUTO: 0.5 10E3/UL (ref 0.8–5.3)
LYMPHOCYTES NFR BLD AUTO: 93 %
MAGNESIUM SERPL-MCNC: 1.8 MG/DL (ref 1.7–2.3)
MCH RBC QN AUTO: 33.2 PG (ref 26.5–33)
MCHC RBC AUTO-ENTMCNC: 35.5 G/DL (ref 31.5–36.5)
MCV RBC AUTO: 94 FL (ref 78–100)
MONOCYTES # BLD AUTO: 0 10E3/UL (ref 0–1.3)
MONOCYTES NFR BLD AUTO: 4 %
MUCOUS THREADS #/AREA URNS LPF: PRESENT /LPF
NEUTROPHILS # BLD AUTO: 0 10E3/UL (ref 1.6–8.3)
NEUTROPHILS NFR BLD AUTO: 4 %
NITRATE UR QL: NEGATIVE
NRBC # BLD AUTO: 0 10E3/UL
NRBC BLD AUTO-RTO: 0 /100
PH UR STRIP: 6 [PH] (ref 5–7)
PHOSPHATE SERPL-MCNC: 3.1 MG/DL (ref 2.5–4.5)
PLAT MORPH BLD: ABNORMAL
PLATELET # BLD AUTO: 25 10E3/UL (ref 150–450)
POTASSIUM SERPL-SCNC: 3.6 MMOL/L (ref 3.4–5.3)
PROT SERPL-MCNC: 6.3 G/DL (ref 6.4–8.3)
RBC # BLD AUTO: 2.32 10E6/UL (ref 3.8–5.2)
RBC MORPH BLD: ABNORMAL
RBC URINE: 29 /HPF
RSV RNA SPEC NAA+PROBE: NEGATIVE
SARS-COV-2 RNA RESP QL NAA+PROBE: NEGATIVE
SODIUM SERPL-SCNC: 135 MMOL/L (ref 135–145)
SP GR UR STRIP: 1.02 (ref 1–1.03)
SQUAMOUS EPITHELIAL: 10 /HPF
URATE SERPL-MCNC: 2.9 MG/DL (ref 2.4–5.7)
UROBILINOGEN UR STRIP-MCNC: NORMAL MG/DL
VARIANT LYMPHS BLD QL SMEAR: PRESENT
WBC # BLD AUTO: 0.6 10E3/UL (ref 4–11)
WBC URINE: 4 /HPF

## 2025-03-18 PROCEDURE — 87040 BLOOD CULTURE FOR BACTERIA: CPT

## 2025-03-18 PROCEDURE — 84550 ASSAY OF BLOOD/URIC ACID: CPT | Performed by: PHYSICIAN ASSISTANT

## 2025-03-18 PROCEDURE — 83605 ASSAY OF LACTIC ACID: CPT | Performed by: INTERNAL MEDICINE

## 2025-03-18 PROCEDURE — 120N000002 HC R&B MED SURG/OB UMMC

## 2025-03-18 PROCEDURE — 87633 RESP VIRUS 12-25 TARGETS: CPT | Performed by: STUDENT IN AN ORGANIZED HEALTH CARE EDUCATION/TRAINING PROGRAM

## 2025-03-18 PROCEDURE — 99233 SBSQ HOSP IP/OBS HIGH 50: CPT | Mod: FS

## 2025-03-18 PROCEDURE — 250N000011 HC RX IP 250 OP 636

## 2025-03-18 PROCEDURE — 84100 ASSAY OF PHOSPHORUS: CPT | Performed by: PHYSICIAN ASSISTANT

## 2025-03-18 PROCEDURE — 83735 ASSAY OF MAGNESIUM: CPT

## 2025-03-18 PROCEDURE — 250N000013 HC RX MED GY IP 250 OP 250 PS 637

## 2025-03-18 PROCEDURE — 85025 COMPLETE CBC W/AUTO DIFF WBC: CPT

## 2025-03-18 PROCEDURE — 71045 X-RAY EXAM CHEST 1 VIEW: CPT | Mod: 26 | Performed by: RADIOLOGY

## 2025-03-18 PROCEDURE — 250N000011 HC RX IP 250 OP 636: Performed by: STUDENT IN AN ORGANIZED HEALTH CARE EDUCATION/TRAINING PROGRAM

## 2025-03-18 PROCEDURE — 83615 LACTATE (LD) (LDH) ENZYME: CPT

## 2025-03-18 PROCEDURE — 36415 COLL VENOUS BLD VENIPUNCTURE: CPT

## 2025-03-18 PROCEDURE — 82040 ASSAY OF SERUM ALBUMIN: CPT

## 2025-03-18 PROCEDURE — 250N000013 HC RX MED GY IP 250 OP 250 PS 637: Performed by: STUDENT IN AN ORGANIZED HEALTH CARE EDUCATION/TRAINING PROGRAM

## 2025-03-18 PROCEDURE — 81001 URINALYSIS AUTO W/SCOPE: CPT | Performed by: STUDENT IN AN ORGANIZED HEALTH CARE EDUCATION/TRAINING PROGRAM

## 2025-03-18 PROCEDURE — 71045 X-RAY EXAM CHEST 1 VIEW: CPT

## 2025-03-18 PROCEDURE — 87086 URINE CULTURE/COLONY COUNT: CPT

## 2025-03-18 PROCEDURE — 87637 SARSCOV2&INF A&B&RSV AMP PRB: CPT | Performed by: STUDENT IN AN ORGANIZED HEALTH CARE EDUCATION/TRAINING PROGRAM

## 2025-03-18 RX ORDER — CEFEPIME HYDROCHLORIDE 2 G/1
2 INJECTION, POWDER, FOR SOLUTION INTRAVENOUS EVERY 8 HOURS
Status: DISCONTINUED | OUTPATIENT
Start: 2025-03-18 | End: 2025-03-21

## 2025-03-18 RX ADMIN — PAROXETINE HYDROCHLORIDE 20 MG: 20 TABLET, FILM COATED ORAL at 09:15

## 2025-03-18 RX ADMIN — HEPARIN, PORCINE (PF) 10 UNIT/ML INTRAVENOUS SYRINGE 5 ML: at 03:22

## 2025-03-18 RX ADMIN — CEFEPIME 2 G: 2 INJECTION, POWDER, FOR SOLUTION INTRAVENOUS at 21:45

## 2025-03-18 RX ADMIN — LORATADINE 10 MG: 10 TABLET ORAL at 09:16

## 2025-03-18 RX ADMIN — HYDROXYZINE HYDROCHLORIDE 25 MG: 25 TABLET, FILM COATED ORAL at 15:59

## 2025-03-18 RX ADMIN — FAMOTIDINE 20 MG: 20 TABLET, FILM COATED ORAL at 21:25

## 2025-03-18 RX ADMIN — ACYCLOVIR 400 MG: 400 TABLET ORAL at 21:25

## 2025-03-18 RX ADMIN — FLUTICASONE FUROATE AND VILANTEROL TRIFENATATE 1 PUFF: 100; 25 POWDER RESPIRATORY (INHALATION) at 09:17

## 2025-03-18 RX ADMIN — LEVOFLOXACIN 500 MG: 500 TABLET, FILM COATED ORAL at 09:16

## 2025-03-18 RX ADMIN — HEPARIN, PORCINE (PF) 10 UNIT/ML INTRAVENOUS SYRINGE 5 ML: at 23:29

## 2025-03-18 RX ADMIN — ACYCLOVIR 400 MG: 400 TABLET ORAL at 09:16

## 2025-03-18 RX ADMIN — ACETAMINOPHEN 650 MG: 325 TABLET, FILM COATED ORAL at 21:51

## 2025-03-18 RX ADMIN — Medication 1000 UNITS: at 09:15

## 2025-03-18 RX ADMIN — ACETAMINOPHEN 650 MG: 325 TABLET, FILM COATED ORAL at 09:16

## 2025-03-18 RX ADMIN — PANTOPRAZOLE SODIUM 40 MG: 40 TABLET, DELAYED RELEASE ORAL at 09:15

## 2025-03-18 RX ADMIN — FAMOTIDINE 20 MG: 20 TABLET, FILM COATED ORAL at 09:16

## 2025-03-18 RX ADMIN — ACETAMINOPHEN 650 MG: 325 TABLET, FILM COATED ORAL at 03:18

## 2025-03-18 RX ADMIN — CALCIUM CARBONATE (ANTACID) CHEW TAB 500 MG 500 MG: 500 CHEW TAB at 16:00

## 2025-03-18 RX ADMIN — FLUTICASONE PROPIONATE 2 SPRAY: 50 SPRAY, METERED NASAL at 09:19

## 2025-03-18 RX ADMIN — HEPARIN, PORCINE (PF) 10 UNIT/ML INTRAVENOUS SYRINGE 5 ML: at 14:37

## 2025-03-18 RX ADMIN — POSACONAZOLE 300 MG: 100 TABLET, DELAYED RELEASE ORAL at 09:16

## 2025-03-18 ASSESSMENT — ACTIVITIES OF DAILY LIVING (ADL)
ADLS_ACUITY_SCORE: 33

## 2025-03-18 NOTE — PLAN OF CARE
Goal Outcome Evaluation:      Plan of Care Reviewed With: patient    Overall Patient Progress: no change    Outcome Evaluation: C1D14 = 3/18    AO x4. VSS on room air. Patient complains of some pain, tylenol given x1. Atarax PRN given x1 for anxiety. PICC is heparin locked. Voiding spontaneously, last bowel movement was 3/17. Up ad vanda. Regular diet. No acute events over night.

## 2025-03-18 NOTE — PLAN OF CARE
"Goal Outcome Evaluation:      Plan of Care Reviewed With: patient    Overall Patient Progress: no changeOverall Patient Progress: no change    Outcome Evaluation: C1D14       3464 - 5570:   /70 (BP Location: Right arm)   Pulse 84   Temp 98.9  F (37.2  C) (Oral)   Resp 18   Ht 1.626 m (5' 4\")   Wt 76.8 kg (169 lb 4.8 oz)   LMP 01/01/2007 (Approximate)   SpO2 100%   BMI 29.06 kg/m      Today is D14 from 7 + 3 Induction.  A&O x 4, AVSS, gave tylenol x 1 for mild headache with effect.   PRN atarax 25 mg & TUMs given per pt request.   UAL, voiding spontaneously, had a bm x 1 today.  Plan for bmbx tomorrow 3/19 at 11 AM.  Continue with poc....    "

## 2025-03-18 NOTE — PROGRESS NOTES
[facility]    Hematology / Oncology Progress Note  Hospital Day # 20  Date of Service (when I saw the patient): 03/18/2025     Assessment & Plan   Alejandra Villegas is a 59 year old female with a past medical history significant for COPD, CKD, migraines, rheumatoid arthritis, aortic stenosis, Afib, CHF, and anxiety who presented to an outside hospital with cytopenias and was found on BMBx to have hypercellular marrow with 4% blasts, 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 She was subsequently admitted to [facility] on 2/26/25 for further work-up and management and pathology re-review was most consistent with AML with NPM1 mutation by WHO 2022 (which does not require a specific blast threshold). Following further multidisciplinary discussion, she started intensive induction with 7+3 (D1=3/5/25).  Her course has been complicated by neutropenic fevers, influenza A, AOM, and L TM perforation.     Today:  - D14 from 7+3 induction.   - Midcycle marrow scheduled 3/19 @ 1100, orders to be placed.   - No transfusion needs today.  - No recurrence of visual disturbances, likely secondary to Voriconazole. Continue Posaconazole for antifungal ppx.   - Continue with best supportive cares.     HEME  # AML with NPM1 mutation  Had been seen by PCP Dec 2024 w/ progressive fatigue and nausea where she was found leukopenic WBC  2.4 and neutropenic w/ ANC 0.3. Hgb 12. Was referred to local hematology/oncology clinic for further evaluation and seen by Dr. Harris. BMBx 2/10/25 done at OSH showed hypercellular marrow w/ trilineage hematopoiesis w/ dyspoiesis with mildly elevated blasts of 4% on morphology. NGS w/ NPM1, IDH2, and NRAS mutations. She was admitted to [facility] 2/26/25 for further workup and management. Slides were re-reviewed by [facility] Hematopathology, who noted hypercellular marrow with 8% blasts by morphology. Despite relatively low blast percentage, findings were  felt most consistent with a diagnosis AML with NPM1 mutation by WHO 2022 (which does not require a specific blast threshold). Following interdepartmental discussions and review of the available literature, patient was started on intensive induction with 7+3 (Day 1=3/5/25).   - PICC placed 3/5/2025, plan to discontinue on discharge.   - Baseline cardiopulmonary studies:  - Echo w/ EF 60-65%, mild known aortic stenosis.   - EKG (2/27) with NSR, QTc 412  - CXR (2/26) with mildly increased streaky bibasilar opacities, atelectasis or edema. No consolidation.   - Baseline viral serologies: CMV IgG+, EBV IgG+, HepB sAg-, HepB cAb-, HepB sAb-, HSV1+/2+, HIV-  - HLA Typing sent on admission. Message sent to notify BMT team x2/27 for IP consult.   - Midcycle marrow scheduled 3/19 @ 1100, orders to be placed closer to date.        Treatment plan: 7+3 (C1D1=3/5/2025)    - Daunorubicin 60 mg/m  IV D1-3    - Cytarabine 100 mg/m  IV D1-7      - Pre-medications: zofran, dexamethasone 12 mg D1-3     # Pancytopenia  Secondary to underlying hematologic malignancy and exacerbated by recent chemotherapy.  - Follow daily CBC with differential  - Transfuse to keep Hgb >7, plt >10K  - Blood consent obtained 2/26/25 on admission and placed in patient's paper chart.      # Risk for TLS  # Hyperphosphatemia, resolved  Secondary to hematologic malignancy, no evidence of TLS on admission.   - Allopurinol 300 mg daily x7 days (through 3/11)  - Follow TLS labs daily    # Risk for DIC  Secondary to underlying hematologic malignancy.  - Follow coags every MWF  - Transfuse cryo to keep fibrinogen >100, FFP to keep INR <1.8     ID  # ID prophylaxis  - Acyclovir 400 mg BID  - Levaquin 500 mg daily  - Posaconazole 300 mg daily   - Vori w/ $1.60 copay, posa requiring PA also w/ $1.60 copay. Rotated from deny to vori (3/13-3/17) w/ completion of chemotherapy, then transitioned to posa x3/18 given visual hallucinations x3/17     # Neutropenic fever,  resolved  # Influenza A, resolved  3/13: Febrile 100.4 overnight x3/13. No localizing/new symptoms. OVSS. Patient was wearing heated jacket that she attributed to temperature, no documentation noted of notifying cross cover MD. No further ID workup (BCx, UA, UC, or CXR) or broadening of abx completed. Given prolonged period on AM chart check x3/14 since documented fever and patient has remained afebrile and OVSS, will defer further workup/initiation of abx.   - If refevers, then would obtain ID workup w/ BCx2, UA, UC, CXR, and broaden abx to cefepime.   2/25: On admission, patient noted subjective fever with chills and rhinitis beginning 2/25 PM prior to admission. No other localizing s/sx of infection. She was afebrile on admission but spiked a low-grade fever to 100.7 on 2/26 PM. Blood and urine cultures negative, CXR with streaky opacities but no burt consolidation. Work-up positive for influenza A, and she completed a course of Tamiflu 75 mg BID x5 days (2/26-3/3). She also received a short empiric course of IV cefepime (2/26-3/1) given concurrent neutropenia.  - Monitor for recurrent fevers    # R AOM with purulent effusion, resolved  # Small L TM perforation, resolved  Patient noted B/L ear pain, L>R s/p drainage from R side on 3/3. Noted h/o recurrent AOMs. On exam, TMs appear full with erythema to outer edges, patient noted pain w/ exam though able to tolerate. Small TM perforation noted to L side. Query if perforation from recurrent AOM or 2/2 congestion and fluid from recent influenza.  - ENT consulted, appreciate recs:  - Recommended 7-day course Augmentin (per cross-cover discussion with pharmacy, given h/o allergic reaction to Augmentin, increased dose of levofloxacin from 500 mg ? 750 mg daily (3/5-3/11) then plan to deescalate to ppx levaquin 500 mg daily.   - 5 day course of floxin drops to left ear for perforation (3/3-3/7)  - Follow up w/ PCP for left TM perforation - if persistent in 6-8 weeks  recommend outpt ENT referral.     GI/  # GERD, improving  # Epigastric pain, resolved  Reported recent increase in symptoms in the days leading up to admission and initiated famotidine.   - 3/9: Patient reported onset of midsternal chest pain that felt like reflux symptoms shortly after eating lunch.  EKG NSR.  States on 3/8, she experienced episode of emesis shortly after eating without preceding symptoms, no nausea at the time.  States this has happened before on occasion and has a history of this happening in the past.  No radiation of pain.  She also wondered if this may be related to the nicotine patch which she removed as she was also experiencing tachycardia, though had also been drinking energy drinks which she is trying to cut back on.  Pain is not reproducible on exam.  Also feels anxiety is attributing to some of her symptoms.  - 3/14: Ongoing GERD symptoms w/ epigastric pain, dull pain localized to epigastric region, worsened w/ N/V and anxiety. Rates 2-3/10, no TTP or reproducible symptoms. Denies melena, dark tarry stools, hematemesis w/ emetic episodes x3/13, or other sx c/f UGIB. Of note, AM Hgb 6.1, will transfuse 1 unit pRBCs and follow for recheck. Add daily PPI to regimen. If persistent/worsening then could consider GI consult.   - Increase famotidine 10 mg BID ? 20 mg BID x3/9.   - Protonix 40 mg daily x3/14   - Continue to encourage lifestyle modifications  - TUMS prn  - SLP consulted 3/10 - Ok for regular diet w/ thin liquids    # Nausea/vomiting, recurrent  Patient noted ongoing nausea w/ occasional vomiting starting Dec 2024. Has been managed with PRN Zofran.  Endorsed nausea on admission, now resolved.   - PRN Zofran and compazine  - Could consider trial of PRN Zyprexa if 3rd agent is desired/required     # Urinary frequency, resolved  # Dysuria, resolved  On admission, patient noted longstanding history of urinary frequency though new mild dysuria. Denies hematuria, bladder tenderness of  "CVA tenderness. UA (2/26) negative, UC with no growth. Symptoms have since resolved.     PULM  # COPD  Longstanding history of COPD. On admission patient reports symptoms are manageable and no recent exacerbations. Most recent PFTs (2018) were normal.  - Continue PTA Breo Ellipta inhaler and PRN DuoNebs      CV  #Non-radiating chest pain, resolved  Patient noted new non-radiating mid-sternal chest pain with associated reflux-like symptoms with anxiety x3/9 after eating lunch. EKG showed NSR. Noted x3/8, experiencing episode of emesis shortly after eating without preceding symptoms, no nausea at the time, and similar mid sternal discomfort that felt like her \"esophagus\".  States this has happened before on occasion and has a history of this happening in the past.  She also wondered if this may be related to the nicotine patch which she removed as she was also experiencing tachycardia, though had also been drinking energy drinks which she is trying to cut back on.  Pain not reproducible on exam.  Also feels anxiety is attributing to some of her symptoms as detailed below.     # Essential hypertension  - Continue PTA lisinopril - HELD 3/11 w/ soft BPs (90s-100s/50s-60s), resume pending daily trends     # Mixed hyperlipidemia  Lipid panel has remained at goal, 1/14/25 panel w/ cholesterol 163, , LDL 92, HDL 45.   - Held PTA atorvastatin on admission due to chemotherapy interactions; will also likely have DDI with azole once started. Consider rotation to rosuvastatin for better drug-drug interaction profile.     # H/o aortic stenosis  Patient noted on admission longstanding history of aortic stenosis. Pre-chemo echo w/ EF 60-65% and mild aortic stenosis and insufficiency. No previous echo to compare.      RENAL/FEN  # Stage 2 CKD  Baseline Cr ~ 0.7. On admission Cr 0.77.  - Continue to trend on daily labs and encourage PO hydration     # Mild hyponatremia, resolved  New mild hyponatremia noted 3/13 with Na 135 ? " 130. Asymptomatic. Trial 1 L NS. If persistent/worsening then could consider initiation of salt tabs, hypertonic saline, and would obtain further workup with urine lytes.     NEURO  # Degenerative disc disease, L5-S1  - Continue PTA gabapentin     # Chronic migraine w/o aura  Present since childhood. Reportedly triggered by neck manipulation/movement. Reportedly well-managed with regimen below.  - Continue PTA sumatriptan and cyclobenzaprine PRN     ENDO  # Prediabetes  Last A1c 6.0 x12/9/24. Has been managing with lifestyle modifications.      MISC  # Rheumatoid arthritis   Patient reported trying treatment though was unable to tolerate well. Managed with Tylenol PRN. Most recent RF >650 (2/10/25). JI negative.       # BROOKS  # MDD  # At risk for adjustment disorder  Patient reports good control of anxiety/depressive symptoms prior to admission. Did note feeling overwhelmed by new diagnosis. On admission discussed inpatient services such as health psychology or cordelia, she respectfully declined though will consider. Ongoing discussion regarding symptoms of anxiety and depression continued throughout admission; patient endorses continuous chemo limiting accessibility to previously frequent trips outside has affected symptoms, feeling hopeful w/ cytarabine to complete tomorrow PM. Discussed increase in dose of Paxil and further d/w health psych; she declined at this time. C/w prn atarax and Paxil.   - Readdress health psych consult as indicated  - Continue PTA paroxetine   - Atarax as needed     # Vitamin D deficiency  Continue PTA vit D supplement    # Seasonal allergies   - Claritin 10 mg daily     # Tobacco use   Has smoked since age 16, 1.5 ppd (roughly 65 pack years). Attempting to cut back as recently as 01/2025 to 0.5 ppd. We discussed the risks of smoking during induction chemotherapy including increased risk for infection in setting of severe neutropenia that could further complicate course, even leading to  death. She expressed understanding and appreciation of conversation.   - Encourage smoking cessation, readdress NRT as needed.   - Nicotine patch ordered 2/28. Patient had been intermittently declining patch and reported associated increased symptoms of anxiety, and felt like patch precipitated anxiety attack w/ chest pain on 3/9. Discontinued 3/11, offered trial of lower dose patch or alternative NRT that patient declined.    - Discussed option of smoking cessation team, patient declined at this time.    Fluids/Electrolytes/Nutrition   - IVF prn   - PRN lyte replacement per standing protocol  - Regular diet as tolerated     Lines: PICC    PPX  VTE: TCP, Lovenox held 3/13  GI: Pepcid, Protonix  Bowels: prn senna and miralax  Activity: as tolerated    Code: DNR/DNI    Medically Ready for Discharge: Anticipated in 5+ Days    Disposition: Admitted for further workup and management. Discharge pending treatment decisions and clinical course.   Follow-up: Will need to request APPT18 closer to discharge date and determine primary oncologist.     45 MINUTES SPENT BY ME on the date of service doing chart review, history, exam, documentation & further activities per the note.      Staffed with Dr. Moreno.    Andrey Crowell PA-C  Hematology/Oncology  Pager [phone]    Interval History   No acute overnight events. Nursing notes reviewed.      Alejandra is feeling chilled this morning after going outside. Denies sweats, fevers, headache, cough, congestion, nausea/vomiting/diarrhea, rash, abdominal pain. She endorses extensive hair loss over the past 24 hours, she has requested assistance with a haircut today. Reviewed plan for bone marrow biopsy tomorrow morning to which she is agreeable. Endorses ongoing heartburn relieved by eructation. Appetite is intact. Last bowel movement yesterday morning. All concerns were addressed and questions were answered.    A comprehensive ROS was performed and was negative except as detailed in the HPI  "above.     Physical Exam   Temp: 98.1  F (36.7  C) Temp src: Oral BP: 108/68 Pulse: 82   Resp: 18 SpO2: 97 % O2 Device: None (Room air)    Vitals:    03/16/25 0710 03/17/25 0759 03/18/25 0823   Weight: 76.7 kg (169 lb 1.6 oz) 77.4 kg (170 lb 11.2 oz) 76.8 kg (169 lb 4.8 oz)     Vital Signs with Ranges  Temp:  [97.8  F (36.6  C)-99.6  F (37.6  C)] 98.1  F (36.7  C)  Pulse:  [82-90] 82  Resp:  [16-18] 18  BP: (108-136)/(52-75) 108/68  Cuff Mean (mmHg):  [68-84] 68  SpO2:  [95 %-99 %] 97 %  I/O last 3 completed shifts:  In: 1760 [P.O.:1760]  Out: 150 [Urine:150]    Physical Exam:    Blood pressure 108/68, pulse 82, temperature 98.1  F (36.7  C), temperature source Oral, resp. rate 18, height 1.626 m (5' 4\"), weight 76.8 kg (169 lb 4.8 oz), last menstrual period 01/01/2007, SpO2 97%, not currently breastfeeding.    Constitutional: Pleasant and cooperative female, in NAD. Alert, interactive, non-toxic. Smiling and conversational.  HEENT: NC/AT, sclera clear, conjunctiva normal, OP with MMM, no discrete intraoral lesions or thrush, poor dentition  Respiratory: No increased work of breathing, CTAB, no crackles or wheezing.  Cardiovascular: RRR, systolic murmur. No peripheral edema. No calf tenderness or palpable cords.  GI: Normal bowel sounds. Abdomen is soft, non-distended and non-tender to palpation.  Skin: Warm, dry, well-perfused. No bruising, bleeding, rashes, or lesions on limited exam.  Musculoskeletal: Diminished muscle bulk, no gross deformities.  Neurologic: A&O. Answers questions appropriately, speech normal. Moves all extremities spontaneously.  Psychiatric: Normal mood and affect.  Vascular access:  PICC on RUE CDI, non-tender, no surrounding erythema.    Medications   Current Facility-Administered Medications   Medication Dose Route Frequency Provider Last Rate Last Admin     Current Facility-Administered Medications   Medication Dose Route Frequency Provider Last Rate Last Admin    acyclovir (ZOVIRAX) " tablet 400 mg  400 mg Oral BID Chelsie Murphy PA-C   400 mg at 03/18/25 0916    famotidine (PEPCID) tablet 20 mg  20 mg Oral BID Zully Garcia PA-C   20 mg at 03/18/25 0916    fluticasone-vilanterol (BREO ELLIPTA) 100-25 MCG/ACT inhaler 1 puff  1 puff Inhalation Daily Chelsie Murphy PA-C   1 puff at 03/18/25 0917    heparin lock flush 10 unit/mL injection 5-20 mL  5-20 mL Intracatheter Q24H Chelsie Murphy PA-C   5 mL at 03/17/25 0803    levofloxacin (LEVAQUIN) tablet 500 mg  500 mg Oral Daily Zully Garcia PA-C   500 mg at 03/18/25 0916    [Held by provider] lisinopril (ZESTRIL) tablet 10 mg  10 mg Oral Daily Jyoti Tenorio PA-C   10 mg at 03/10/25 1039    loratadine (CLARITIN) tablet 10 mg  10 mg Oral Daily Chelsie Murphy PA-C   10 mg at 03/18/25 0916    pantoprazole (PROTONIX) EC tablet 40 mg  40 mg Oral QAM AC Chelsie Murphy PA-C   40 mg at 03/18/25 0915    PARoxetine (PAXIL) tablet 20 mg  20 mg Oral QAM Chelsie Murphy PA-C   20 mg at 03/18/25 0915    posaconazole (NOXAFIL) DR tablet TBEC 300 mg  300 mg Oral QAM Nery Crowell PA-C   300 mg at 03/18/25 0916    sodium chloride (PF) 0.9% PF flush 10-40 mL  10-40 mL Intracatheter Q8H Chelsie Murphy PA-C   10 mL at 03/17/25 0807    Vitamin D3 (CHOLECALCIFEROL) tablet 1,000 Units  1,000 Units Oral Daily Chelsie Murphy PA-C   1,000 Units at 03/18/25 0915     Data   Results for orders placed or performed during the hospital encounter of 02/26/25 (from the past 24 hours)   CBC with platelets differential    Narrative    The following orders were created for panel order CBC with platelets differential.  Procedure                               Abnormality         Status                     ---------                               -----------         ------                     CBC with platelets and ...[[id number]]  Abnormal            Final result                RBC and Platelet Morpho...[[id number]]  Abnormal            Final result                 Please view results for these tests on the individual orders.   Comprehensive metabolic panel   Result Value Ref Range    Sodium 135 135 - 145 mmol/L    Potassium 3.6 3.4 - 5.3 mmol/L    Carbon Dioxide (CO2) 20 (L) 22 - 29 mmol/L    Anion Gap 13 7 - 15 mmol/L    Urea Nitrogen 11.4 6.0 - 20.0 mg/dL    Creatinine 0.63 0.51 - 0.95 mg/dL    GFR Estimate >90 >60 mL/min/1.73m2    Calcium 8.8 8.8 - 10.4 mg/dL    Chloride 102 98 - 107 mmol/L    Glucose 116 (H) 70 - 99 mg/dL    Alkaline Phosphatase 112 40 - 150 U/L    AST 10 0 - 45 U/L    ALT 9 0 - 50 U/L    Protein Total 6.3 (L) 6.4 - 8.3 g/dL    Albumin 3.5 3.5 - 5.2 g/dL    Bilirubin Total 0.7 <=1.2 mg/dL   Magnesium   Result Value Ref Range    Magnesium 1.8 1.7 - 2.3 mg/dL   Lactate Dehydrogenase (aka LDH)   Result Value Ref Range    Lactate Dehydrogenase 191 0 - 250 U/L   Phosphorus   Result Value Ref Range    Phosphorus 3.1 2.5 - 4.5 mg/dL   Uric acid   Result Value Ref Range    Uric Acid 2.9 2.4 - 5.7 mg/dL   Lactic Acid Whole Blood w/ 1x repeat in 2 hrs when >2   Result Value Ref Range    Lactic Acid, Initial 0.9 0.7 - 2.0 mmol/L   CBC with platelets and differential   Result Value Ref Range    WBC Count 0.6 (LL) 4.0 - 11.0 10e3/uL    RBC Count 2.32 (L) 3.80 - 5.20 10e6/uL    Hemoglobin 7.7 (L) 11.7 - 15.7 g/dL    Hematocrit 21.7 (L) 35.0 - 47.0 %    MCV 94 78 - 100 fL    MCH 33.2 (H) 26.5 - 33.0 pg    MCHC 35.5 31.5 - 36.5 g/dL    RDW 17.2 (H) 10.0 - 15.0 %    Platelet Count 25 (LL) 150 - 450 10e3/uL    % Neutrophils 4 %    % Lymphocytes 93 %    % Monocytes 4 %    % Eosinophils 0 %    % Basophils 0 %    % Immature Granulocytes 0 %    NRBCs per 100 WBC 0 <1 /100    Absolute Neutrophils 0.0 (LL) 1.6 - 8.3 10e3/uL    Absolute Lymphocytes 0.5 (L) 0.8 - 5.3 10e3/uL    Absolute Monocytes 0.0 0.0 - 1.3 10e3/uL    Absolute Eosinophils 0.0 0.0 - 0.7 10e3/uL    Absolute Basophils 0.0  0.0 - 0.2 10e3/uL    Absolute Immature Granulocytes 0.0 <=0.4 10e3/uL    Absolute NRBCs 0.0 10e3/uL   RBC and Platelet Morphology   Result Value Ref Range    RBC Morphology Confirmed RBC Indices     Platelet Assessment  Automated Count Confirmed. Platelet morphology is normal.     Automated Count Confirmed. Platelet morphology is normal.    Reactive Lymphocytes Present (A) None Seen

## 2025-03-19 ENCOUNTER — APPOINTMENT (OUTPATIENT)
Dept: CT IMAGING | Facility: CLINIC | Age: 60
End: 2025-03-19
Payer: MEDICARE

## 2025-03-19 LAB
ABO + RH BLD: NORMAL
ALBUMIN SERPL BCG-MCNC: 3.3 G/DL (ref 3.5–5.2)
ALP SERPL-CCNC: 106 U/L (ref 40–150)
ALT SERPL W P-5'-P-CCNC: 7 U/L (ref 0–50)
ANION GAP SERPL CALCULATED.3IONS-SCNC: 10 MMOL/L (ref 7–15)
APTT PPP: 31 SECONDS (ref 22–38)
AST SERPL W P-5'-P-CCNC: 8 U/L (ref 0–45)
ATRIAL RATE - MUSE: 94 BPM
BASOPHILS # BLD MANUAL: 0 10E3/UL (ref 0–0.2)
BASOPHILS NFR BLD MANUAL: 0 %
BILIRUB SERPL-MCNC: 0.4 MG/DL
BLD GP AB SCN SERPL QL: NEGATIVE
BLD PROD TYP BPU: NORMAL
BLOOD COMPONENT TYPE: NORMAL
BUN SERPL-MCNC: 10.7 MG/DL (ref 6–20)
C PNEUM DNA SPEC QL NAA+PROBE: NOT DETECTED
CALCIUM SERPL-MCNC: 8.5 MG/DL (ref 8.8–10.4)
CHLORIDE SERPL-SCNC: 101 MMOL/L (ref 98–107)
CODING SYSTEM: NORMAL
CREAT SERPL-MCNC: 0.66 MG/DL (ref 0.51–0.95)
CROSSMATCH: NORMAL
DIASTOLIC BLOOD PRESSURE - MUSE: NORMAL MMHG
EGFRCR SERPLBLD CKD-EPI 2021: >90 ML/MIN/1.73M2
EOSINOPHIL # BLD MANUAL: 0 10E3/UL (ref 0–0.7)
EOSINOPHIL NFR BLD MANUAL: 2 %
ERYTHROCYTE [DISTWIDTH] IN BLOOD BY AUTOMATED COUNT: 16.7 % (ref 10–15)
FIBRINOGEN PPP-MCNC: 577 MG/DL (ref 170–510)
FLUAV H1 2009 PAND RNA SPEC QL NAA+PROBE: NOT DETECTED
FLUAV H1 RNA SPEC QL NAA+PROBE: NOT DETECTED
FLUAV H3 RNA SPEC QL NAA+PROBE: NOT DETECTED
FLUAV RNA SPEC QL NAA+PROBE: NOT DETECTED
FLUBV RNA SPEC QL NAA+PROBE: NOT DETECTED
GLUCOSE SERPL-MCNC: 136 MG/DL (ref 70–99)
HADV DNA SPEC QL NAA+PROBE: NOT DETECTED
HCO3 SERPL-SCNC: 22 MMOL/L (ref 22–29)
HCOV PNL SPEC NAA+PROBE: NOT DETECTED
HCT VFR BLD AUTO: 19.6 % (ref 35–47)
HGB BLD-MCNC: 7 G/DL (ref 11.7–15.7)
HMPV RNA SPEC QL NAA+PROBE: NOT DETECTED
HPIV1 RNA SPEC QL NAA+PROBE: NOT DETECTED
HPIV2 RNA SPEC QL NAA+PROBE: NOT DETECTED
HPIV3 RNA SPEC QL NAA+PROBE: NOT DETECTED
HPIV4 RNA SPEC QL NAA+PROBE: NOT DETECTED
INR PPP: 1.08 (ref 0.85–1.15)
INTERPRETATION ECG - MUSE: NORMAL
ISSUE DATE AND TIME: NORMAL
LACTATE SERPL-SCNC: 1.3 MMOL/L (ref 0.7–2)
LDH SERPL L TO P-CCNC: 163 U/L (ref 0–250)
LIPASE SERPL-CCNC: 31 U/L (ref 13–60)
LYMPHOCYTES # BLD MANUAL: 0.6 10E3/UL (ref 0.8–5.3)
LYMPHOCYTES NFR BLD MANUAL: 86 %
M PNEUMO DNA SPEC QL NAA+PROBE: NOT DETECTED
MAGNESIUM SERPL-MCNC: 1.8 MG/DL (ref 1.7–2.3)
MCH RBC QN AUTO: 33.5 PG (ref 26.5–33)
MCHC RBC AUTO-ENTMCNC: 35.7 G/DL (ref 31.5–36.5)
MCV RBC AUTO: 94 FL (ref 78–100)
MONOCYTES # BLD MANUAL: 0 10E3/UL (ref 0–1.3)
MONOCYTES NFR BLD MANUAL: 0 %
NEUTROPHILS # BLD MANUAL: 0.1 10E3/UL (ref 1.6–8.3)
NEUTROPHILS NFR BLD MANUAL: 12 %
P AXIS - MUSE: 74 DEGREES
PHOSPHATE SERPL-MCNC: 3.2 MG/DL (ref 2.5–4.5)
PLAT MORPH BLD: NORMAL
PLATELET # BLD AUTO: 17 10E3/UL (ref 150–450)
POTASSIUM SERPL-SCNC: 3 MMOL/L (ref 3.4–5.3)
POTASSIUM SERPL-SCNC: 3.8 MMOL/L (ref 3.4–5.3)
PR INTERVAL - MUSE: 126 MS
PROT SERPL-MCNC: 6.1 G/DL (ref 6.4–8.3)
QRS DURATION - MUSE: 76 MS
QT - MUSE: 330 MS
QTC - MUSE: 412 MS
R AXIS - MUSE: 22 DEGREES
RBC # BLD AUTO: 2.09 10E6/UL (ref 3.8–5.2)
RBC MORPH BLD: NORMAL
RSV RNA SPEC QL NAA+PROBE: NOT DETECTED
RSV RNA SPEC QL NAA+PROBE: NOT DETECTED
RV+EV RNA SPEC QL NAA+PROBE: NOT DETECTED
SODIUM SERPL-SCNC: 133 MMOL/L (ref 135–145)
SPECIMEN EXP DATE BLD: NORMAL
SYSTOLIC BLOOD PRESSURE - MUSE: NORMAL MMHG
T AXIS - MUSE: 68 DEGREES
TROPONIN T SERPL HS-MCNC: 10 NG/L
TROPONIN T SERPL HS-MCNC: 10 NG/L
UNIT ABO/RH: NORMAL
UNIT NUMBER: NORMAL
UNIT STATUS: NORMAL
UNIT TYPE ISBT: 6200
URATE SERPL-MCNC: 2.9 MG/DL (ref 2.4–5.7)
VENTRICULAR RATE- MUSE: 94 BPM
WBC # BLD AUTO: 0.7 10E3/UL (ref 4–11)

## 2025-03-19 PROCEDURE — 36415 COLL VENOUS BLD VENIPUNCTURE: CPT

## 2025-03-19 PROCEDURE — 84100 ASSAY OF PHOSPHORUS: CPT | Performed by: PHYSICIAN ASSISTANT

## 2025-03-19 PROCEDURE — 87040 BLOOD CULTURE FOR BACTERIA: CPT

## 2025-03-19 PROCEDURE — 85384 FIBRINOGEN ACTIVITY: CPT | Performed by: PHYSICIAN ASSISTANT

## 2025-03-19 PROCEDURE — 250N000011 HC RX IP 250 OP 636

## 2025-03-19 PROCEDURE — 99223 1ST HOSP IP/OBS HIGH 75: CPT | Mod: 24

## 2025-03-19 PROCEDURE — 84132 ASSAY OF SERUM POTASSIUM: CPT | Performed by: INTERNAL MEDICINE

## 2025-03-19 PROCEDURE — 83615 LACTATE (LD) (LDH) ENZYME: CPT

## 2025-03-19 PROCEDURE — 99233 SBSQ HOSP IP/OBS HIGH 50: CPT | Mod: FS | Performed by: INTERNAL MEDICINE

## 2025-03-19 PROCEDURE — 88341 IMHCHEM/IMCYTCHM EA ADD ANTB: CPT | Mod: 26 | Performed by: PATHOLOGY

## 2025-03-19 PROCEDURE — 82947 ASSAY GLUCOSE BLOOD QUANT: CPT

## 2025-03-19 PROCEDURE — 88342 IMHCHEM/IMCYTCHM 1ST ANTB: CPT | Mod: 26 | Performed by: PATHOLOGY

## 2025-03-19 PROCEDURE — 93005 ELECTROCARDIOGRAM TRACING: CPT

## 2025-03-19 PROCEDURE — 120N000002 HC R&B MED SURG/OB UMMC

## 2025-03-19 PROCEDURE — 99418 PROLNG IP/OBS E/M EA 15 MIN: CPT | Performed by: INTERNAL MEDICINE

## 2025-03-19 PROCEDURE — 71260 CT THORAX DX C+: CPT

## 2025-03-19 PROCEDURE — 83690 ASSAY OF LIPASE: CPT

## 2025-03-19 PROCEDURE — 88311 DECALCIFY TISSUE: CPT | Mod: 26 | Performed by: PATHOLOGY

## 2025-03-19 PROCEDURE — 250N000013 HC RX MED GY IP 250 OP 250 PS 637

## 2025-03-19 PROCEDURE — 82435 ASSAY OF BLOOD CHLORIDE: CPT

## 2025-03-19 PROCEDURE — 250N000013 HC RX MED GY IP 250 OP 250 PS 637: Performed by: INTERNAL MEDICINE

## 2025-03-19 PROCEDURE — 85730 THROMBOPLASTIN TIME PARTIAL: CPT | Performed by: PHYSICIAN ASSISTANT

## 2025-03-19 PROCEDURE — 079T3ZX DRAINAGE OF BONE MARROW, PERCUTANEOUS APPROACH, DIAGNOSTIC: ICD-10-PCS | Performed by: PHYSICIAN ASSISTANT

## 2025-03-19 PROCEDURE — 88184 FLOWCYTOMETRY/ TC 1 MARKER: CPT | Performed by: STUDENT IN AN ORGANIZED HEALTH CARE EDUCATION/TRAINING PROGRAM

## 2025-03-19 PROCEDURE — 85007 BL SMEAR W/DIFF WBC COUNT: CPT

## 2025-03-19 PROCEDURE — 71260 CT THORAX DX C+: CPT | Mod: 26 | Performed by: RADIOLOGY

## 2025-03-19 PROCEDURE — 85014 HEMATOCRIT: CPT

## 2025-03-19 PROCEDURE — 88342 IMHCHEM/IMCYTCHM 1ST ANTB: CPT | Mod: TC

## 2025-03-19 PROCEDURE — 86900 BLOOD TYPING SEROLOGIC ABO: CPT

## 2025-03-19 PROCEDURE — 86923 COMPATIBILITY TEST ELECTRIC: CPT

## 2025-03-19 PROCEDURE — 07DR3ZX EXTRACTION OF ILIAC BONE MARROW, PERCUTANEOUS APPROACH, DIAGNOSTIC: ICD-10-PCS | Performed by: PHYSICIAN ASSISTANT

## 2025-03-19 PROCEDURE — 999N000044 HC STATISTIC CVC DRESSING CHANGE

## 2025-03-19 PROCEDURE — 88189 FLOWCYTOMETRY/READ 16 & >: CPT | Mod: GC | Performed by: STUDENT IN AN ORGANIZED HEALTH CARE EDUCATION/TRAINING PROGRAM

## 2025-03-19 PROCEDURE — P9016 RBC LEUKOCYTES REDUCED: HCPCS

## 2025-03-19 PROCEDURE — 88313 SPECIAL STAINS GROUP 2: CPT | Mod: 26 | Performed by: PATHOLOGY

## 2025-03-19 PROCEDURE — 999N000007 HC SITE CHECK

## 2025-03-19 PROCEDURE — 88313 SPECIAL STAINS GROUP 2: CPT | Mod: TC

## 2025-03-19 PROCEDURE — 84550 ASSAY OF BLOOD/URIC ACID: CPT | Performed by: PHYSICIAN ASSISTANT

## 2025-03-19 PROCEDURE — 250N000011 HC RX IP 250 OP 636: Performed by: STUDENT IN AN ORGANIZED HEALTH CARE EDUCATION/TRAINING PROGRAM

## 2025-03-19 PROCEDURE — 88305 TISSUE EXAM BY PATHOLOGIST: CPT | Mod: 26 | Performed by: PATHOLOGY

## 2025-03-19 PROCEDURE — 85097 BONE MARROW INTERPRETATION: CPT | Performed by: PATHOLOGY

## 2025-03-19 PROCEDURE — 84484 ASSAY OF TROPONIN QUANT: CPT

## 2025-03-19 PROCEDURE — 83735 ASSAY OF MAGNESIUM: CPT

## 2025-03-19 PROCEDURE — 74160 CT ABDOMEN W/CONTRAST: CPT | Mod: 26 | Performed by: RADIOLOGY

## 2025-03-19 PROCEDURE — 88185 FLOWCYTOMETRY/TC ADD-ON: CPT

## 2025-03-19 PROCEDURE — 85060 BLOOD SMEAR INTERPRETATION: CPT | Mod: GC | Performed by: PATHOLOGY

## 2025-03-19 PROCEDURE — 83605 ASSAY OF LACTIC ACID: CPT | Performed by: INTERNAL MEDICINE

## 2025-03-19 PROCEDURE — 85610 PROTHROMBIN TIME: CPT | Performed by: PHYSICIAN ASSISTANT

## 2025-03-19 RX ORDER — IOPAMIDOL 755 MG/ML
105 INJECTION, SOLUTION INTRAVASCULAR ONCE
Status: COMPLETED | OUTPATIENT
Start: 2025-03-19 | End: 2025-03-19

## 2025-03-19 RX ORDER — POTASSIUM CHLORIDE 750 MG/1
20 TABLET, EXTENDED RELEASE ORAL ONCE
Status: COMPLETED | OUTPATIENT
Start: 2025-03-19 | End: 2025-03-19

## 2025-03-19 RX ORDER — PANTOPRAZOLE SODIUM 40 MG/1
40 TABLET, DELAYED RELEASE ORAL
Status: DISCONTINUED | OUTPATIENT
Start: 2025-03-19 | End: 2025-04-06 | Stop reason: HOSPADM

## 2025-03-19 RX ORDER — POTASSIUM CHLORIDE 750 MG/1
40 TABLET, EXTENDED RELEASE ORAL ONCE
Status: COMPLETED | OUTPATIENT
Start: 2025-03-19 | End: 2025-03-19

## 2025-03-19 RX ADMIN — FLUTICASONE PROPIONATE 2 SPRAY: 50 SPRAY, METERED NASAL at 07:49

## 2025-03-19 RX ADMIN — IOPAMIDOL 105 ML: 755 INJECTION, SOLUTION INTRAVENOUS at 16:35

## 2025-03-19 RX ADMIN — ACYCLOVIR 400 MG: 400 TABLET ORAL at 07:50

## 2025-03-19 RX ADMIN — POTASSIUM CHLORIDE 40 MEQ: 750 TABLET, EXTENDED RELEASE ORAL at 05:29

## 2025-03-19 RX ADMIN — POTASSIUM CHLORIDE 20 MEQ: 750 TABLET, EXTENDED RELEASE ORAL at 07:49

## 2025-03-19 RX ADMIN — CEFEPIME 2 G: 2 INJECTION, POWDER, FOR SOLUTION INTRAVENOUS at 19:49

## 2025-03-19 RX ADMIN — PAROXETINE HYDROCHLORIDE 20 MG: 20 TABLET, FILM COATED ORAL at 07:49

## 2025-03-19 RX ADMIN — ACETAMINOPHEN 650 MG: 325 TABLET, FILM COATED ORAL at 19:51

## 2025-03-19 RX ADMIN — CEFEPIME 2 G: 2 INJECTION, POWDER, FOR SOLUTION INTRAVENOUS at 13:50

## 2025-03-19 RX ADMIN — POSACONAZOLE 300 MG: 100 TABLET, DELAYED RELEASE ORAL at 07:50

## 2025-03-19 RX ADMIN — ACETAMINOPHEN 650 MG: 325 TABLET, FILM COATED ORAL at 03:59

## 2025-03-19 RX ADMIN — FLUTICASONE FUROATE AND VILANTEROL TRIFENATATE 1 PUFF: 100; 25 POWDER RESPIRATORY (INHALATION) at 07:49

## 2025-03-19 RX ADMIN — HEPARIN, PORCINE (PF) 10 UNIT/ML INTRAVENOUS SYRINGE 5 ML: at 20:38

## 2025-03-19 RX ADMIN — PANTOPRAZOLE SODIUM 40 MG: 40 TABLET, DELAYED RELEASE ORAL at 17:22

## 2025-03-19 RX ADMIN — HEPARIN, PORCINE (PF) 10 UNIT/ML INTRAVENOUS SYRINGE 5 ML: at 07:49

## 2025-03-19 RX ADMIN — CEFEPIME 2 G: 2 INJECTION, POWDER, FOR SOLUTION INTRAVENOUS at 03:59

## 2025-03-19 RX ADMIN — MIDAZOLAM 1 MG: 1 INJECTION INTRAMUSCULAR; INTRAVENOUS at 11:32

## 2025-03-19 RX ADMIN — LORATADINE 10 MG: 10 TABLET ORAL at 07:50

## 2025-03-19 RX ADMIN — PANTOPRAZOLE SODIUM 40 MG: 40 TABLET, DELAYED RELEASE ORAL at 07:50

## 2025-03-19 RX ADMIN — HYDROXYZINE HYDROCHLORIDE 25 MG: 25 TABLET, FILM COATED ORAL at 17:22

## 2025-03-19 RX ADMIN — FAMOTIDINE 20 MG: 20 TABLET, FILM COATED ORAL at 19:48

## 2025-03-19 RX ADMIN — ACYCLOVIR 400 MG: 400 TABLET ORAL at 19:48

## 2025-03-19 RX ADMIN — HYDROXYZINE HYDROCHLORIDE 25 MG: 25 TABLET, FILM COATED ORAL at 03:59

## 2025-03-19 RX ADMIN — FAMOTIDINE 20 MG: 20 TABLET, FILM COATED ORAL at 07:49

## 2025-03-19 RX ADMIN — Medication 1000 UNITS: at 07:49

## 2025-03-19 ASSESSMENT — ACTIVITIES OF DAILY LIVING (ADL)
ADLS_ACUITY_SCORE: 33

## 2025-03-19 NOTE — CONSULTS
"    Gastroenterology Consultation  GI Luminal Service    Date of Admission:  2/26/2025  Reason for Admission: AML with NPM1 mutation, chemotherapy induction  Date of Consult  3/19/2025   Requesting Physician:  Radha Moreno MD           ASSESSMENT AND RECOMMENDATIONS:   Assessment:  Alejandra Villegas is a 59 year old female with a history of COPD with ongoing tobacco use, essential hypertension, mixed hyperlipidemia, stage 2 chronic kidney disease, migraines, rheumatoid arthritis, aortic stenosis, atrial fibrillation, congestive heart failure, degenerative disc disease, and anxiety, depression, vitamin D deficiency, bladder stimulator (unable to have MRIs), who presented to an outside hospital with cytopenias, found on bone marrow biopsy to have hypercellular marrow with 4% blasts, consistent with AML with NPM1, IDH2, and NRAS mutations, subsequently admitted to [facility] on 2/26/2025 for further workup and management, with [facility] hematopathology re-review of bone marrow with 8% blasts by morphology most consistent with AML with NPM1 mutation by WHO 2022 (which does not require specific blast threshold).  Following further multidisciplinary discussion, patient was started on intensive induction with 7+3 (Daunorubicin, Cytarabine with premedications ondansetron and dexamethasone) (D1=3/5/2025) with subsequent pancytopenia.  Patient's hospital course has been complicated by neutropenic fevers, influenza A, acute otitis media, left tympanic membrane perforation, electrolyte abnormalities including hyperphosphatemia.    The GI Luminal Service was consulted regarding: \"60 yo F smoker, worsening GERD/epigastric pain despite famotidine, PPI, GERD precautions with SLP. 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     Lipase within normal limits. No overt GI bleeding at this time. No previous GI endoscopies.     - Anti-acid medications: Pantoprazole 40 mg PO once daily, Famotidine 20 mg PO BID.   - No abdominal or chest CT this admission.   - 3/19: ANC 0.1 10e3/uL; Platelets 17 10e3/uL; hemoglobin 7.0 g/dL.     Acute progressive substernal and epigastric pain differential includes chemotherapy adverse effect (Daunorubicin - abdominal pain in 1-10% per UpToDate, less likely Cytarabine), mucositis related to chemotherapy and severe neutropenia, medication adverse effect, electrolyte abnormalities, peptic ulcer disease, GERD, gastropathy/gastritis, pancreatitis, duodenitis, dyspepsia, musculoskeletal, post-infectious (recent Influenza A); other considerations include cardiac etiologies such as MI, arrhythmia, pericarditis (patient intermittently clenching chest during interview); potentially abdominal migraine with history of migraines; ?potential nutritional deficiencies.        Recommendations:  -- No acute indication for GI endoscopic intervention.   -- STAT EKG and troponin to assess for underlying cardiac etiologies of nonspecific substernal and epigastric pain.  -- Defer additional cardiac work-up to primary team.   -- If okay from a chemotherapy and pharmacist oncological standpoint (potential concern for medication interactions with PPI), increase pantoprazole to 40 mg orally twice daily and continue famotidine 20 mg orally twice daily for potential upper GI pathologies  contributing to nonspecific chest and epigastric pain.  - If unable to increase pantoprazole, consider increasing famotidine to 40 mg orally twice daily.  -- Please consider following an antireflux regimen. This includes:  - Do not lie down for at least 3 to 4 hours after meals.  - Raise the head of the bed 6 to 8 inches (35-45 degrees).  - Avoid foods and liquids that cause symptoms.  - Avoid cigarettes and other tobacco products.  -- CT Chest/Abdomen to assess for underlying etiologies of progressive substernal and epigastric pain.  -- Tobacco cessation.   -- Pancytopenia corrections per primary team.   -- Inpatient pharmacy consult to review medications administered to patient for adverse effects of chest pain, abdominal pain, dyspepsia (potentially medication adverse effect given 1 week history of this 3 week admission).   -- If above cardiac work-up and CT scan are negative for underlying pathology/etiology of substernal chest pain and epigastric abdominal pain, could consider trial of sumatriptan for potential abdominal migraine.   -- Ongoing electrolyte optimization.   -- Ongoing nutritional optimization.  - Appreciate RD involvement and recommendations --> ?any nutritional deficiencies that should be considered as potential underlying etiologies.   -- Analgesia/Antiemetics per primary team.   -- Rest of cares per primary oncology team.   -- If the patient experiences overt GI bleeding with hemodynamic instability, please page the GI Luminal Service (listed on [facility]).       Outpatient:  -- No outpatient GI clinic follow-up necessary.       COVID status: negative 3/18/2025.     Discussed with Andrey Crowell PA-C - Heme Malignancy team via [employer].     Thank you for involving us in this patient's care. Please do not hesitate to contact the GI service with any questions or concerns.     The patient was discussed and plan agreed upon with Dr. Josue Chris, GI Luminal Service staff physician.    Overall time spent  "on the date of this encounter preparing to see the patient (including chart review of available notes, clinical status events, imaging and labs); discussing, ordering, coordinating recommended medications, tests or procedures; communicating with other health care professionals; and documenting the above clinical information in the electronic medical record was 90 minutes.    Consuelo Valadez PA-C  Inpatient Gastroenterology Service  [facility]  Radha          History of Present Illness:   Patient seen and examined at 1030. History is obtained from patient and chart review.    Alejandra Villegas is a 59 year old female with a history of COPD with ongoing tobacco use, essential hypertension, mixed hyperlipidemia, stage 2 chronic kidney disease, migraines, rheumatoid arthritis, aortic stenosis, atrial fibrillation, congestive heart failure, degenerative disc disease, and anxiety, depression, vitamin D deficiency, bladder stimulator (unable to have MRIs), who presented to an outside hospital with cytopenias, found on bone marrow biopsy to have hypercellular marrow with 4% blasts, consistent with AML with NPM1, IDH2, and NRAS mutations, subsequently admitted to [facility] on 2/26/2025 for further workup and management, with [facility] hematopathology re-review of bone marrow with 8% blasts by morphology most consistent with AML with NPM1 mutation by WHO 2022 (which does not require specific blast threshold).  Following further multidisciplinary discussion, patient was started on intensive induction with 7+3 (D1=3/5/2025) with subsequent pancytopenia.  Patient's hospital course has been complicated by neutropenic fevers, influenza A, acute otitis media, left tympanic membrane perforation, electrolyte abnormalities including hyperphosphatemia.    The GI Luminal Service was consulted regarding: \"60 yo F smoker, worsening GERD/epigastric pain despite famotidine, PPI, GERD precautions with SLP. Please assess " "for further workup and management.\"      Patient reports 1 week history of progressive substernal chest pain and epigastric pain, initially intermittent but now constant since 0500 this morning.    Intermittent nausea but is only vomited once in the last week.  Endorses early satiety.  Endorses good appetite, but limited by this substernal and epigastric pain.    Patient reports at baseline prior to admission only 2 bowel movements per week.  Since admission, has had around 1 bowel movement daily.  Last bowel movement was loose, watery.  Still only having 1/day.    Endorses ongoing cigarette smoking, half pack per day.    Confirms no previous GI endoscopies.      Previous GI Endoscopic Procedures:  -- No previous GI endoscopies on file. Patient confirms no previous GI endoscopies.             Past Medical History:   Reviewed and edited as appropriate  Past Medical History:   Diagnosis Date    Allergic rhinitis 09/27/2011         Disorder of kidney and ureter 08/08/2008    Kidney disease GFR 87    Dorsalgia     No Comments Provided    Generalized anxiety disorder     No Comments Provided    Hidradenitis suppurativa     No Comments Provided    Other disorders of lung (CODE) 08/01/2007    Pulmonary nodules, stable on follow-up chest CT 12/08.  No further imaging recommended.    Other specified disorders of Eustachian tube, unspecified ear 02/27/2012         Personal history of other medical treatment (CODE)     Childbirth x4    Rheumatoid arthritis (H) 02/27/2012         Tobacco use     No Comments Provided            Past Surgical History:   Reviewed and edited as appropriate   Past Surgical History:   Procedure Laterality Date    ARTHROSCOPY KNEE  05/2017    Dr Torres    ARTHROSCOPY KNEE  07/20/2017    Dr Garza, lateral release, meniscal repair    ARTHROTOMY WRIST Left 2011    Dr. Torres    BONE MARROW BIOPSY, BONE SPECIMEN, NEEDLE/TROCAR Left 02/13/2025    Procedure: Bone marrow biopsy, bone specimen, needle/trocar;  " Surgeon: Hema Mari MD;  Location:  OR    CHOLECYSTECTOMY  06/2007    Emergency tracheotomy following failed intubation for laparoscopic cholecystectomy.    DILATION AND CURETTAGE  09/2006         HERNIA REPAIR  2007    Incisoinal hernia repair    HYSTERECTOMY TOTAL ABDOMINAL  02/2010         IMPLANT STIMULATOR AND LEADS SACRAL NERVE (STAGE ONE AND TWO) N/A 12/11/2018    Procedure: Interstim Battery Replacement;  Surgeon: Rl Ambriz MD;  Location:  OR    INTERSTIM DEVICE STAGE 2  01/06/2014    Dr. Ambriz - [facility]    LAPAROSCOPIC ABLATION ENDOMETRIOSIS  09/2006         OOPHORECTOMY Left 2010     Dr Thorpe    PICC INSERTION - DOUBLE LUMEN Right 03/05/2025    5FR, DL, total cath length=42 CM, visible cath length= 3CM, brachial medial vein    RECONSTRUCT FOREFOOT WITH METATARSOPHALANGEAL (MTP) FUSION Right 05/05/2022    Procedure: Right fibular sesmoid excision and 1st metatarsal phalangeal joint fusion;  Surgeon: Rl Nathan DPM;  Location: [facility] OR    RELEASE CARPAL TUNNEL  02/02/2017         RELEASE PLANTAR FASCIA Left 12/19/2024    Procedure: excision of soft tissue mass left foot,  gastroc recession;  Surgeon: Rl Nathan DPM;  Location:  OR    REMOVE HARDWARE FOOT Right 08/10/2023    Procedure: REMOVAL, HARDWARE, FOOT;  Surgeon: Rl Nathan DPM;  Location: [facility] OR    SALPINGO OOPHORECTOMY,R/L/KELSEY Right 06/1999    Right salpingo-oophorectomy for hemorrhagic corpus luteum cyst    TRACHEOSTOMY  2007    emergency following failed intubation during cholecystectomy    TYMPANOSTOMY, LOCAL/TOPICAL ANESTHESIA  08/2006    PE tube placement              Social History:   The patient lives in [place], [place].     Alcohol: Denies.   Tobacco: 1/2 pack per day.  Illicit drugs: Denies.            Family History:   Patient's family history is reviewed today and is non-contributory  -- Sister has Crohn's Disease and Achalasia.   -- Denies family history of GI cancers.   Family History   Problem Relation  Age of Onset    Arthritis Mother         Arthritis,Rheumatoid    Cancer Mother         Cancer, lung and uterine cancer    Heart Disease Father         Heart Disease,CAD, CABG, peripheral vascular dise    Thyroid Disease Father         Thyroid Disease, hyperlipidemia    Other - See Comments Father         djd    Asthma Brother         Asthma    Asthma Sister         Asthma    Other - See Comments Sister         Psychiatric illness,Anxiety    Other - See Comments Son         x2 back surgeries    Breast Cancer No family hx of         Cancer-breast             Allergies:   Reviewed and edited as appropriate     Allergies   Allergen Reactions    Adhesive Tape     Bee Pollen Swelling     Seasonal    Bee Venom Unknown    Folic Acid Itching    Pollen Extract      Seasonal    Amoxicillin Rash    Liquid Adhesive Rash    Meloxicam Rash    Nabumetone GI Disturbance     GI upset            Medications:     Current Facility-Administered Medications   Medication Dose Route Frequency Provider Last Rate Last Admin    acetaminophen (TYLENOL) tablet 650 mg  650 mg Oral Q4H PRN Chelsie Murphy PA-C   650 mg at 03/19/25 0359    acyclovir (ZOVIRAX) tablet 400 mg  400 mg Oral BID Chelsie Murphy PA-C   400 mg at 03/19/25 0750    albuterol (PROVENTIL HFA/VENTOLIN HFA) inhaler  1-2 puff Inhalation Q4H PRN Chelsie Murphy PA-C        calcium carbonate (TUMS) chewable tablet 500 mg  500 mg Oral TID PRN Minesh Driscoll MD   500 mg at 03/18/25 1600    ceFEPIme (MAXIPIME) 2 g vial to attach to  mL bag for ADULTS or NS 50 mL bag for PEDS  2 g Intravenous Q8H Fer Cole MD   2 g at 03/19/25 0359    cyclobenzaprine (FLEXERIL) tablet 10 mg  10 mg Oral At Bedtime PRN Chelsie Murphy PA-C   10 mg at 02/27/25 1542    famotidine (PEPCID) tablet 20 mg  20 mg Oral BID Zully Garcia PA-C   20 mg at 03/19/25 0749    fluticasone (FLONASE) 50 MCG/ACT spray 2 spray  2 spray Both Nostrils BID  PRN Chelsie Murphy PA-C   2 spray at 03/19/25 0749    fluticasone-vilanterol (BREO ELLIPTA) 100-25 MCG/ACT inhaler 1 puff  1 puff Inhalation Daily Chelsie Murphy PA-C   1 puff at 03/19/25 0749    gabapentin (NEURONTIN) capsule 400 mg  400 mg Oral TID PRN Chelsie Murphy PA-C        heparin lock flush 10 unit/mL injection 5-20 mL  5-20 mL Intracatheter Q1H PRN Chelsie Murphy PA-C   5 mL at 03/15/25 0443    heparin lock flush 10 unit/mL injection 5-20 mL  5-20 mL Intracatheter Q24H Chelsie Murphy PA-C   5 mL at 03/17/25 0803    heparin lock flush 10 unit/mL injection 5-20 mL  5-20 mL Intracatheter Q1H PRN Chelsie Murphy PA-C   5 mL at 03/19/25 0749    hydrOXYzine HCl (ATARAX) tablet 25 mg  25 mg Oral Q6H PRN Zully Garcia PA-C   25 mg at 03/19/25 0359    Or    hydrOXYzine HCl (ATARAX) tablet 50 mg  50 mg Oral Q6H PRN Zully Garcia PA-C        ipratropium - albuterol 0.5 mg/2.5 mg/3 mL (DUONEB) neb solution 3 mL  1 vial Nebulization Q4H PRN Chelsie Murphy PA-C        [Held by provider] levofloxacin (LEVAQUIN) tablet 500 mg  500 mg Oral Daily Zully Garcia PA-C   500 mg at 03/18/25 0916    lidocaine (LMX4) cream   Topical Q1H PRN Chelsie Murphy PA-C        lidocaine 1 % 0.1-1 mL  0.1-1 mL Other Q1H PRN Chelsie Murphy PA-C        [Held by provider] lisinopril (ZESTRIL) tablet 10 mg  10 mg Oral Daily Jyoti Tenorio PA-C   10 mg at 03/10/25 1039    loratadine (CLARITIN) tablet 10 mg  10 mg Oral Daily Chelsie Murphy PA-C   10 mg at 03/19/25 0750    LORazepam (ATIVAN) injection 0.5-1 mg  0.5-1 mg Intravenous Q6H PRN Roney Hwang MD        LORazepam (ATIVAN) tablet 0.5-1 mg  0.5-1 mg Oral Q6H PRN Roney Hwang MD        ondansetron (ZOFRAN) injection 4 mg  4 mg Intravenous Q8H PRN Chelsie Murphy PA-C   4 mg at 03/13/25 1315    Or    ondansetron (ZOFRAN ODT)  ODT tab 4 mg  4 mg Oral Q8H PRN Chelsie Murphy PA-C   4 mg at 03/09/25 1310    Or    ondansetron (ZOFRAN) tablet 4 mg  4 mg Oral Q8H PRN Chelsie Murphy PA-C   4 mg at 03/16/25 0811    pantoprazole (PROTONIX) EC tablet 40 mg  40 mg Oral QAM AC Chelsie Murphy PA-C   40 mg at 03/19/25 0750    PARoxetine (PAXIL) tablet 20 mg  20 mg Oral QAM Chelsie Murphy PA-C   20 mg at 03/19/25 0749    polyethylene glycol (MIRALAX) Packet 17 g  17 g Oral Daily PRN Chelsie Murphy PA-C        posaconazole (NOXAFIL) DR tablet TBEC 300 mg  300 mg Oral QANery Bliss PA-C   300 mg at 03/19/25 0750    prochlorperazine (COMPAZINE) tablet 5 mg  5 mg Oral Q6H PRN Chelsie Murphy PA-C   5 mg at 03/13/25 0029    Or    prochlorperazine (COMPAZINE) injection 5 mg  5 mg Intravenous Q6H PRN Chelsie Murphy PA-C   5 mg at 03/13/25 1734    senna-docusate (SENOKOT-S/PERICOLACE) 8.6-50 MG per tablet 1 tablet  1 tablet Oral BID PRN Chelsie Murphy PA-C        Or    senna-docusate (SENOKOT-S/PERICOLACE) 8.6-50 MG per tablet 2 tablet  2 tablet Oral BID PRN Chelsie Murphy PA-C        sodium chloride (PF) 0.9% PF flush 10-20 mL  10-20 mL Intracatheter q1 min prn Chelsie Murphy PA-C        sodium chloride (PF) 0.9% PF flush 10-40 mL  10-40 mL Intracatheter Q8H Chelsie Murphy PA-C   10 mL at 03/19/25 0748    sodium chloride (PF) 0.9% PF flush 3 mL  3 mL Intracatheter q1 min prn Chelsie Murphy PA-C        SUMAtriptan (IMITREX) tablet 50 mg  50 mg Oral at onset of headache Chelsie Murphy PA-C   50 mg at 02/27/25 1708    Vitamin D3 (CHOLECALCIFEROL) tablet 1,000 Units  1,000 Units Oral Daily Chelsie Murphy PA-C   1,000 Units at 03/19/25 0749             Review of Systems:     A complete review of systems was performed and is negative except as noted in the HPI           Physical Exam:    Temp: 98.2  F (36.8  C) Temp src: Oral BP: 115/73 Pulse: 80   Resp: 18 SpO2: 98 % O2 Device: None (Room air)    Wt:   Wt Readings from Last 2 Encounters:   03/19/25 77.8 kg (171 lb 8 oz)   02/13/25 79.4 kg (175 lb)        General: 59 year old female sitting up in the hospital bed in NAD. Appears comfortable.  Answers appropriately.    HEENT: Head is AT/NC. Sclera anicteric.   Lungs: No increased work of breathing, speaking in full sentences, equal chest rise. On Room Air.   Heart: Regular rate. Peripheral perfusion intact.  Abdomen: Soft, +epigastric tenderness to deep palpation without rebound or guarding, non-distended. No peritoneal signs.  Extremities: WWP.  Musculoskeletal: No gross deformity.  Skin: No jaundice or rash on exposed skin.  Neurologic: Grossly non-focal.  CN 2-12 grossly intact.   Mental status/Psych: A&O. Asks/answers questions appropriately. Pleasant, interactive.             Data:   LAB WORK:    BMP  Recent Labs   Lab 03/19/25 0248 03/18/25 0323 03/17/25  0335 03/16/25  0503   * 135 134* 135   POTASSIUM 3.0* 3.6 3.7 3.7   CHLORIDE 101 102 102 103   TOBIN 8.5* 8.8 8.8 8.8   CO2 22 20* 23 21*   BUN 10.7 11.4 11.1 15.2   CR 0.66 0.63 0.62 0.70   * 116* 108* 116*     CBC  Recent Labs   Lab 03/19/25 0248 03/18/25 0323 03/17/25  0335 03/16/25  0503   WBC 0.7* 0.6* 0.5* 0.9*   RBC 2.09* 2.32* 1.96* 2.33*   HGB 7.0* 7.7* 6.8* 7.9*   HCT 19.6* 21.7* 18.7* 22.2*   MCV 94 94 95 95   MCH 33.5* 33.2* 34.7* 33.9*   MCHC 35.7 35.5 36.4 35.6   RDW 16.7* 17.2* 16.9* 17.4*   PLT 17* 25* 43* 8*     INR  Recent Labs   Lab 03/19/25  0248 03/17/25  0335 03/14/25  0421   INR 1.08 1.29* 1.10     LFTs  Recent Labs   Lab 03/19/25  0248 03/18/25  0323 03/17/25  0335 03/16/25  0503   ALKPHOS 106 112 113 120   AST 8 10 10 11   ALT 7 9 8 9   BILITOTAL 0.4 0.7 0.6 0.8   PROTTOTAL 6.1* 6.3* 6.3* 6.7   ALBUMIN 3.3* 3.5 3.5 3.6      PANCNo lab results found in last 7 days.    IMAGING:  -- No abdominal imaging  this admission.      3/18/2025 Portable chest  INDICATION: Neutropenic fever     COMPARISON: To/26/25     FINDINGS: Scattered mild patchy densities in the lung bases slightly  more prominent may indicate edema, atelectasis or even  inflammation/infection a right PICC tip in the SVC. Atherosclerotic  calcification at the aortic knob. Heart size normal.                                                                      IMPRESSION: Scattered patchy densities especially in the lung bases  medically mild edema/atelectasis, recommend follow-up to clearing to  exclude infection.      XR CHEST PORT 1 VIEW  2/26/2025 8:48 PM   History:  Investigating any infectious etiology        Comparison:  1/23/2024     Findings: Single view of the chest.     Trachea is midline. The cardiomediastinal silhouette is stable and  within normal limits. No significant pleural effusion or pneumothorax.  Streaky bibasilar pulmonary opacities are mildly increased. The  visualized upper abdomen is unremarkable. No acute bony abnormality.                                                                      Impression: Streaky bibasilar opacities, likely atelectasis or edema.  No consolidation.      =======================================================================

## 2025-03-19 NOTE — PROGRESS NOTES
Neutropenic Fever Occurrence  Shift: [id number]   Vitals: T = 100.6  Is this the patient's first fever? Yes  Is the patient neutropenic? Yes  Neutropenia is defined as an ANC <500 or an ANC of 1000 with a predicted drop to 500 or less (i.e., receiving chemo), patient should be on neutropenic precautions until ANC >1000  Time of provider notification: 1950  Who was notified: Ron Cole MD  Blood cultures drawn: Yes  It is recommended that peripheral blood cultures be drawn every 24 hours when a patient is neutropenic and febrile.  Next blood cultures due: 3/19 @ 1930  Antibiotics Ordered/Current Regimen: IV cefepime  Time Administered: 2145  Antibiotics should be administered in <60 min of neutropenic fever to decrease morbidity and mortality.  UA Completed: Yes  CXR Completed: Yes  Viral Swab Completed: Yes  Sputum Culture Completed: No  These tests are recommended for the first spike

## 2025-03-19 NOTE — PROGRESS NOTES
[facility] - [facility]    Hematology / Oncology Progress Note  Hospital Day # 21  Date of Service (when I saw the patient): 03/19/2025     Assessment & Plan   Alejandra Villegas is a 59 year old female with a past medical history significant for COPD, migraines, rheumatoid arthritis, aortic stenosis, Afib, and anxiety who presented to an outside hospital with cytopenias and was found on BMBx to have hypercellular marrow with 4% blasts, 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 She was subsequently admitted to [facility] on 2/26/25 for further work-up and management and pathology re-review was most consistent with AML with NPM1 mutation by WHO 2022 (which does not require a specific blast threshold). Following further multidisciplinary discussion, she started intensive induction with 7+3 (D1=3/5/25).  Her course has been complicated by neutropenic fevers, influenza A, AOM, and L TM perforation.     Today:  - D15 from 7+3 induction.   - Midcycle marrow completed today, sending for flow, morph, process & hold   - Neutropenic fever Tmax 100.6 overnight. Asymptomatic, although notes sweating upon waking the following morning. Started IV Cefepime 2 g q8h. CXR with scattered patchy densities, especially in the lung bases. Respiratory panel negative. UA with protein and blood, no sign of infection. Of note, taking APAP daily for chronic headaches which may be suppressing fevers.   - UCx and BCx pending  - Monitor for fever recurrence  - Epigastric pain worsening today, no longer relieved with eructation or GERD precautions provided by SLP. EKG NSR, Troponin and Lipase wnl. GI consulted for progressive pain, recommending increased pepcid/PPI with CT chest/abd to assess underlying etiology. Per pharmacy, ok to increase Protonix to 40 mg BID for symptom management. Appreciate.  - Hgb 7.0 on AM labs. Received 1 unit RBCs.  - Continue with best supportive cares.     HEME  # AML  with NPM1 mutation  Had been seen by PCP Dec 2024 w/ progressive fatigue and nausea where she was found leukopenic WBC  2.4 and neutropenic w/ ANC 0.3. Hgb 12. Was referred to local hematology/oncology clinic for further evaluation and seen by Dr. Harris. BMBx 2/10/25 done at OSH showed hypercellular marrow w/ trilineage hematopoiesis w/ dyspoiesis with mildly elevated blasts of 4% on morphology. NGS w/ NPM1, IDH2, and NRAS mutations. She was admitted to [facility] 2/26/25 for further workup and management. Slides were re-reviewed by [facility], who noted hypercellular marrow with 8% blasts by morphology. Despite relatively low blast percentage, findings were felt most consistent with a diagnosis AML with NPM1 mutation by WHO 2022 (which does not require a specific blast threshold). Following interdepartmental discussions and review of the available literature, patient was started on intensive induction with 7+3 (Day 1=3/5/25).   - PICC placed 3/5/2025, plan to discontinue on discharge.   - Baseline cardiopulmonary studies:  - Echo w/ EF 60-65%, mild known aortic stenosis.   - EKG (2/27) with NSR, QTc 412  - CXR (2/26) with mildly increased streaky bibasilar opacities, atelectasis or edema. No consolidation.   - Baseline viral serologies: CMV IgG+, EBV IgG+, HepB sAg-, HepB cAb-, HepB sAb-, HSV1+/2+, HIV-  - HLA Typing sent on admission. Message sent to notify BMT team x2/27 for IP consult.   - Midcycle marrow completed 3/19/25  - Follow for flow, morph       Treatment plan: 7+3 (C1D1=3/5/2025)    - Daunorubicin 60 mg/m  IV D1-3    - Cytarabine 100 mg/m  IV D1-7      - Pre-medications: zofran, dexamethasone 12 mg D1-3     # Pancytopenia  Secondary to underlying hematologic malignancy and exacerbated by recent chemotherapy.  - Follow daily CBC with differential  - Transfuse to keep Hgb >7, plt >10K  - Blood consent obtained 2/26/25 on admission and placed in patient's paper chart.      # Risk for TLS  #  Hyperphosphatemia, resolved  Secondary to hematologic malignancy, no evidence of TLS on admission.   - Allopurinol 300 mg daily x7 days (through 3/11)  - Follow TLS labs daily    # Risk for DIC  Secondary to underlying hematologic malignancy.  - Follow coags every MWF  - Transfuse cryo to keep fibrinogen >100, FFP to keep INR <1.8     ID  # ID prophylaxis  - Acyclovir 400 mg BID  - Levaquin 500 mg daily  - Posaconazole 300 mg daily   - Vori w/ $1.60 copay, posa requiring PA also w/ $1.60 copay. Rotated from deny to vori (3/13-3/17) w/ completion of chemotherapy, then transitioned to posa x3/18 given visual hallucinations x3/17     # Neutropenic fever  # Influenza A, resolved  3/19: Tmax 100.6 overnight. Asymptomatic, although notes sweating upon waking the following morning. Started IV Cefepime 2 g q8h. CXR with scattered patchy densities, especially in the lung bases. Respiratory panel negative. UA with protein and blood, no sign of infection. Of note, taking APAP daily for headaches which may be suppressing fevers.   - UCx and BCx pending  - Continue to monitor for fever recurrence.  3/13: Febrile 100.4 overnight x3/13. No localizing/new symptoms. OVSS. Patient was wearing heated jacket that she attributed to temperature, no documentation noted of notifying cross cover MD. No further ID workup (BCx, UA, UC, or CXR) or broadening of abx completed. Given prolonged period on AM chart check x3/14 since documented fever and patient has remained afebrile and OVSS, will defer further workup/initiation of abx.   2/25: On admission, patient noted subjective fever with chills and rhinitis beginning 2/25 PM prior to admission. No other localizing s/sx of infection. She was afebrile on admission but spiked a low-grade fever to 100.7 on 2/26 PM. Blood and urine cultures negative, CXR with streaky opacities but no burt consolidation. Work-up positive for influenza A, and she completed a course of Tamiflu 75 mg BID x5 days  (2/26-3/3). She also received a short empiric course of IV cefepime (2/26-3/1) given concurrent neutropenia.    # R AOM with purulent effusion, resolved  # Small L TM perforation, resolved  Patient noted B/L ear pain, L>R s/p drainage from R side on 3/3. Noted h/o recurrent AOMs. On exam, TMs appear full with erythema to outer edges, patient noted pain w/ exam though able to tolerate. Small TM perforation noted to L side. Query if perforation from recurrent AOM or 2/2 congestion and fluid from recent influenza.  - ENT consulted, appreciate recs:  - Recommended 7-day course Augmentin (per cross-cover discussion with pharmacy, given h/o allergic reaction to Augmentin, increased dose of levofloxacin from 500 mg ? 750 mg daily (3/5-3/11) then plan to deescalate to ppx levaquin 500 mg daily.   - 5 day course of floxin drops to left ear for perforation (3/3-3/7)  - Follow up w/ PCP for left TM perforation - if persistent in 6-8 weeks recommend outpt ENT referral.     GI/  # GERD  # Epigastric pain  Reported recent increase in symptoms in the days leading up to admission and initiated famotidine.   - 3/9: Patient reported onset of midsternal chest pain that felt like reflux symptoms shortly after eating lunch.  EKG NSR.  States on 3/8, she experienced episode of emesis shortly after eating without preceding symptoms, no nausea at the time.  States this has happened before on occasion and has a history of this happening in the past.  No radiation of pain.  She also wondered if this may be related to the nicotine patch which she removed as she was also experiencing tachycardia, though had also been drinking energy drinks which she is trying to cut back on.  Pain is not reproducible on exam.  Also feels anxiety is attributing to some of her symptoms.  - 3/14: Ongoing GERD symptoms w/ epigastric pain, dull pain localized to epigastric region, worsened w/ N/V and anxiety. Rates 2-3/10, no TTP or reproducible symptoms. Denies  melena, dark tarry stools, hematemesis w/ emetic episodes x3/13, or other sx c/f UGIB. Of note, AM Hgb 6.1, will transfuse 1 unit pRBCs and follow for recheck. Add daily PPI to regimen. If persistent/worsening then could consider GI consult.   - 3/19: Epigastric pain worsening today, no longer relieved with eructation or GERD precautions provided by SLP. EKG NSR, Troponin and Lipase wnl. GI consulted for progressive pain, recommending increased pepcid/PPI with CT chest/abd to assess underlying etiology. Per pharmacy, ok to increase Protonix to 40 mg BID for symptom management. Appreciate.  - Famotidine 10 mg BID ? 20 mg BID x3/9  - SLP consulted 3/10 - Ok for regular diet w/ thin liquids, signed off 3/13  - Protonix 40 mg daily x3/14 ? BID x3/19  - Continue to encourage lifestyle modifications  - TUMS prn    # Nausea/vomiting, recurrent  Patient noted ongoing nausea w/ occasional vomiting starting Dec 2024. Has been managed with PRN Zofran.  Endorsed nausea on admission, now resolved.   - PRN Zofran and compazine  - Could consider trial of PRN Zyprexa if 3rd agent is desired/required     # Urinary frequency, resolved  # Dysuria, resolved  On admission, patient noted longstanding history of urinary frequency though new mild dysuria. Denies hematuria, bladder tenderness of CVA tenderness. UA (2/26) negative, UC with no growth. Symptoms have since resolved.     PULM  # COPD  Longstanding history of COPD. On admission patient reports symptoms are manageable and no recent exacerbations. Most recent PFTs (2018) were normal.  - Continue PTA Breo Ellipta inhaler and PRN DuoNebs      CV  #Non-radiating chest pain, resolved  Patient noted new non-radiating mid-sternal chest pain with associated reflux-like symptoms with anxiety x3/9 after eating lunch. EKG showed NSR. Noted x3/8, experiencing episode of emesis shortly after eating without preceding symptoms, no nausea at the time, and similar mid sternal discomfort that felt  "like her \"esophagus\".  States this has happened before on occasion and has a history of this happening in the past.  She also wondered if this may be related to the nicotine patch which she removed as she was also experiencing tachycardia, though had also been drinking energy drinks which she is trying to cut back on.  Pain not reproducible on exam.  Also feels anxiety is attributing to some of her symptoms as detailed below.     # Essential hypertension  - Continue PTA lisinopril - HELD 3/11 w/ soft BPs (90s-100s/50s-60s), resume pending daily trends     # Mixed hyperlipidemia  Lipid panel has remained at goal, 1/14/25 panel w/ cholesterol 163, , LDL 92, HDL 45.   - Held PTA atorvastatin on admission due to chemotherapy interactions; will also likely have DDI with azole once started. Consider rotation to rosuvastatin for better drug-drug interaction profile.     # H/o aortic stenosis  Patient noted on admission longstanding history of aortic stenosis. Pre-chemo echo w/ EF 60-65% and mild aortic stenosis and insufficiency. No previous echo to compare.      RENAL/FEN  # Stage 2 CKD  Baseline Cr ~ 0.7. On admission Cr 0.77.  - Continue to trend on daily labs and encourage PO hydration     # Mild hyponatremia, resolved  New mild hyponatremia noted 3/13 with Na 135 ? 130. Asymptomatic. Trial 1 L NS. If persistent/worsening then could consider initiation of salt tabs, hypertonic saline, and would obtain further workup with urine lytes.     NEURO  # Degenerative disc disease, L5-S1  - Continue PTA gabapentin PRN     # Chronic migraine w/o aura  # Chronic headaches  Present since childhood. Reportedly triggered by neck manipulation/movement. Reportedly well-managed with regimen below. Headaches occurring throughout admission with daily APAP use. Present prior to admission, managed with APAP PRN at home. Patient denies acute worsening of headache or changing of characteristics.  - APAP PRN  - Continue PTA sumatriptan " and cyclobenzaprine PRN  - Continue to monitor      ENDO  # Prediabetes  Last A1c 6.0 x12/9/24. Has been managing with lifestyle modifications.      MISC  # Rheumatoid arthritis   Patient reported trying treatment though was unable to tolerate well. Managed with Tylenol PRN. Most recent RF >650 (2/10/25). JI negative.       # BROOSK  # MDD  # At risk for adjustment disorder  Patient reports good control of anxiety/depressive symptoms prior to admission. Did note feeling overwhelmed by new diagnosis. On admission discussed inpatient services such as health psychology or cordelia, she respectfully declined though will consider. Ongoing discussion regarding symptoms of anxiety and depression continued throughout admission; patient endorses continuous chemo limiting accessibility to previously frequent trips outside has affected symptoms, feeling hopeful w/ cytarabine to complete tomorrow PM. Discussed increase in dose of Paxil and further d/w health psych; she declined at this time. C/w prn atarax and Paxil.   - Readdress health psych consult as indicated  - Continue PTA paroxetine   - Atarax as needed     # Vitamin D deficiency  Continue PTA vit D supplement    # Seasonal allergies   - Claritin 10 mg daily     # Tobacco use   Has smoked since age 16, 1.5 ppd (roughly 65 pack years). Attempting to cut back as recently as 01/2025 to 0.5 ppd. We discussed the risks of smoking during induction chemotherapy including increased risk for infection in setting of severe neutropenia that could further complicate course, even leading to death. She expressed understanding and appreciation of conversation.   - Encourage smoking cessation, readdress NRT as needed.   - Nicotine patch ordered 2/28. Patient had been intermittently declining patch and reported associated increased symptoms of anxiety, and felt like patch precipitated anxiety attack w/ chest pain on 3/9. Discontinued 3/11, offered trial of lower dose patch or alternative  NRT that patient declined.    - Discussed option of smoking cessation team, patient declined at this time.    Fluids/Electrolytes/Nutrition   - IVF prn   - PRN lyte replacement per standing protocol  - Regular diet as tolerated     Lines: PICC    PPX  VTE: TCP, Lovenox held 3/13  GI: Pepcid, Protonix  Bowels: prn senna and miralax  Activity: as tolerated    Code: DNR/DNI    Medically Ready for Discharge: Anticipated in 5+ Days    Disposition: Admitted for further workup and management. Discharge pending treatment decisions and clinical course.   Follow-up: Will need to request APPT18 closer to discharge date and determine primary oncologist.     75 MINUTES SPENT BY ME on the date of service doing chart review, history, exam, documentation & further activities per the note.      Staffed with Dr. Moreno.    Andrey Crowell PA-C  Hematology/Oncology  Pager [phone]    Interval History   No acute overnight events. Nursing notes reviewed.      Alejandra is feeling anxious this morning about her bone marrow biopsy. Reviewed procedural details, risks/benefits, and timeline for results. Patient is agreeable to plan and tolerated the procedure very well. Reviewed fever overnight, during which time patient denies symptoms, although notes she was sweating when she woke up. Discussed ongoing infectious workup and current antibiotic regimen. Patient endorses worsening epigastric pain that is non-radiating and no longer relieved with eructation. Reviewed plan for bring the GI team onboard to assist in further evaluation and management, to which she is appreciative. Denies shortness of breath, headache, nausea/vomiting/diarrhea, cough, congestion, visual disturbances, rashes, peripheral edema. Reiterated the importance of hand hygiene, masking, and having only healthy visitors given her neutropenic state, during which she stated understanding. All concerns were addressed and questions were answered.     A comprehensive ROS was performed  "and was negative except as detailed in the HPI above.     Physical Exam   Temp: 98.2  F (36.8  C) Temp src: Oral BP: 115/73 Pulse: 80   Resp: 18 SpO2: 98 % O2 Device: None (Room air)    Vitals:    03/17/25 0759 03/18/25 0823 03/19/25 0836   Weight: 77.4 kg (170 lb 11.2 oz) 76.8 kg (169 lb 4.8 oz) 77.8 kg (171 lb 8 oz)     Vital Signs with Ranges  Temp:  [97.9  F (36.6  C)-100.6  F (38.1  C)] 98.2  F (36.8  C)  Pulse:  [73-95] 80  Resp:  [18] 18  BP: (105-131)/(68-83) 115/73  SpO2:  [96 %-100 %] 98 %  No intake/output data recorded.    Physical Exam:    Blood pressure 115/73, pulse 80, temperature 98.2  F (36.8  C), temperature source Oral, resp. rate 18, height 1.626 m (5' 4\"), weight 77.8 kg (171 lb 8 oz), last menstrual period 01/01/2007, SpO2 98%, not currently breastfeeding.    Constitutional: Pleasant and cooperative female, in NAD. Alert, interactive, non-toxic. Smiling and conversational.  HEENT: NC/AT, sclera clear, conjunctiva normal, OP with MMM, no discrete intraoral lesions or thrush, poor dentition  Respiratory: No increased work of breathing, CTAB, no crackles or wheezing.  Cardiovascular: RRR, systolic murmur. No peripheral edema. No calf tenderness or palpable cords.  GI: Normal bowel sounds. Abdomen is soft, non-distended and non-tender to palpation.  Skin: Warm, dry, well-perfused. No bruising, bleeding, rashes, or lesions on limited exam. BMBx site bandaged.  Musculoskeletal: Diminished muscle bulk, no gross deformities.  Neurologic: A&O. Answers questions appropriately, speech normal. Moves all extremities spontaneously.  Psychiatric: Normal mood and affect.  Vascular access:  PICC on RUE CDI, non-tender, no surrounding erythema.    Medications   Current Facility-Administered Medications   Medication Dose Route Frequency Provider Last Rate Last Admin     Current Facility-Administered Medications   Medication Dose Route Frequency Provider Last Rate Last Admin    acyclovir (ZOVIRAX) tablet 400 mg  " 400 mg Oral BID Chelsie Murphy PA-C   400 mg at 03/19/25 0750    ceFEPIme (MAXIPIME) 2 g vial to attach to  mL bag for ADULTS or NS 50 mL bag for PEDS  2 g Intravenous Q8H Fer Cole MD   2 g at 03/19/25 0359    famotidine (PEPCID) tablet 20 mg  20 mg Oral BID Zully Garcia PA-C   20 mg at 03/19/25 0749    fluticasone-vilanterol (BREO ELLIPTA) 100-25 MCG/ACT inhaler 1 puff  1 puff Inhalation Daily Chelsie Murphy PA-C   1 puff at 03/19/25 0749    heparin lock flush 10 unit/mL injection 5-20 mL  5-20 mL Intracatheter Q24H Cehlsie Murphy PA-C   5 mL at 03/17/25 0803    [Held by provider] levofloxacin (LEVAQUIN) tablet 500 mg  500 mg Oral Daily Zully Garcia PA-C   500 mg at 03/18/25 0916    [Held by provider] lisinopril (ZESTRIL) tablet 10 mg  10 mg Oral Daily Jyoti Tenorio PA-C   10 mg at 03/10/25 1039    loratadine (CLARITIN) tablet 10 mg  10 mg Oral Daily Chelsie Murphy PA-C   10 mg at 03/19/25 0750    midazolam (VERSED) injection 1-2 mg  1-2 mg Intravenous Once Nery Crowell PA-C        pantoprazole (PROTONIX) EC tablet 40 mg  40 mg Oral QAM AC Chelsie Murphy PA-C   40 mg at 03/19/25 0750    PARoxetine (PAXIL) tablet 20 mg  20 mg Oral QAM Chelsie Murphy PA-C   20 mg at 03/19/25 0749    posaconazole (NOXAFIL) DR tablet TBEC 300 mg  300 mg Oral QAM Nery Crowell PA-C   300 mg at 03/19/25 0750    sodium chloride (PF) 0.9% PF flush 10-40 mL  10-40 mL Intracatheter Q8H Chelsie Murphy PA-C   10 mL at 03/19/25 0748    Vitamin D3 (CHOLECALCIFEROL) tablet 1,000 Units  1,000 Units Oral Daily Chelsie Murphy PA-C   1,000 Units at 03/19/25 0749     Data   Results for orders placed or performed during the hospital encounter of 02/26/25 (from the past 24 hours)   XR Chest Port 1 View    Narrative    Portable chest    INDICATION: Neutropenic fever    COMPARISON: To/26/25    FINDINGS:  Scattered mild patchy densities in the lung bases slightly  more prominent may indicate edema, atelectasis or even  inflammation/infection a right PICC tip in the SVC. Atherosclerotic  calcification at the aortic knob. Heart size normal.      Impression    IMPRESSION: Scattered patchy densities especially in the lung bases  medically mild edema/atelectasis, recommend follow-up to clearing to  exclude infection.    PRABHAKAR JUAREZ MD         SYSTEM ID:  [id number]   UA with Microscopic reflex to Culture    Specimen: Urine, Midstream   Result Value Ref Range    Color Urine Yellow Colorless, Straw, Light Yellow, Yellow    Appearance Urine Slightly Cloudy (A) Clear    Glucose Urine Negative Negative mg/dL    Bilirubin Urine Negative Negative    Ketones Urine Negative Negative mg/dL    Specific Gravity Urine 1.018 1.003 - 1.035    Blood Urine Moderate (A) Negative    pH Urine 6.0 5.0 - 7.0    Protein Albumin Urine 20 (A) Negative mg/dL    Urobilinogen Urine Normal Normal, 2.0 mg/dL    Nitrite Urine Negative Negative    Leukocyte Esterase Urine Negative Negative    Mucus Urine Present (A) None Seen /LPF    RBC Urine 29 (H) <=2 /HPF    WBC Urine 4 <=5 /HPF    Squamous Epithelials Urine 10 (H) <=1 /HPF    Narrative    Urine Culture not indicated   Respiratory Panel PCR    Specimen: Nasopharyngeal; Swab   Result Value Ref Range    Adenovirus Not Detected Not Detected    Coronavirus Not Detected Not Detected    Human Metapneumovirus Not Detected Not Detected    Human Rhin/Enterovirus Not Detected Not Detected    Influenza A Not Detected Not Detected    Influenza A, H1 Not Detected Not Detected    Influenza A 2009 H1N1 Not Detected Not Detected    Influenza A, H3 Not Detected Not Detected    Influenza B Not Detected Not Detected    Parainfluenza Virus 1 Not Detected Not Detected    Parainfluenza Virus 2 Not Detected Not Detected    Parainfluenza Virus 3 Not Detected Not Detected    Parainfluenza Virus 4 Not Detected Not  Detected    Respiratory Syncytial Virus A Not Detected Not Detected    Respiratory Syncytial Virus B Not Detected Not Detected    Chlamydia Pneumoniae Not Detected Not Detected    Mycoplasma Pneumoniae Not Detected Not Detected    Narrative    The [employer] Respiratory Panel is a qualitative nucleic acid, multiplex, in vitro diagnostic test for the simultaneous detection and identification of multiple respiratory viral and bacterial nucleic acids in nasopharyngeal swabs collected in viral transport media from individual exhibiting signs and symptoms of respiratory infection. The assay has received FDA approval for the testing of nasopharyngeal (NP) swabs only. This test is used for clinical purposes and should not be regarded as investigational or for research. This laboratory is certified under the Clinical Laboratory Improvement Amendments of 1988 (CLIA-88) as qualified to perform high complexity clinical laboratory testing.   Influenza A/B, RSV and SARS-CoV2 PCR (COVID-19) Nose    Specimen: Nose; Swab   Result Value Ref Range    Influenza A PCR Negative Negative    Influenza B PCR Negative Negative    RSV PCR Negative Negative    SARS CoV2 PCR Negative Negative    Narrative    Testing was performed using the Xpert Xpress CoV2/Flu/RSV Assay on the [employer] GeneXpert Instrument. This test should be ordered for the detection of SARS-CoV2, influenza, and RSV viruses in individuals with signs and symptoms of respiratory tract infection. This test is for in vitro diagnostic use under the [place] FDA for laboratories certified under CLIA to perform high or moderate complexity testing. This test has been [place] FDA cleared. A negative result does not rule out the presence of PCR inhibitors in the specimen or target RNA in concentration below the limit of detection for the assay. If only one viral target is positive but coinfection with multiple targets is suspected, the sample should be re-tested with another FDA cleared, approved, or  authorized test, if coninfection would change clinical management. This test was validated by the [facility]. These laboratories are certified under the Clinical Laboratory Improvement Amendments of 1988 (CLIA-88) as qualified to perfom high complexity laboratory testing.   Lactic Acid Whole Blood w/ 1x repeat in 2 hrs when >2   Result Value Ref Range    Lactic Acid, Initial 0.7 0.7 - 2.0 mmol/L   CBC with platelets differential    Narrative    The following orders were created for panel order CBC with platelets differential.  Procedure                               Abnormality         Status                     ---------                               -----------         ------                     CBC with platelets and ...[[id number]]  Abnormal            Final result               RBC and Platelet Morpho...[[id number]]                      Final result               Manual Differential[[id number]]         Abnormal            Final result                 Please view results for these tests on the individual orders.   ABO/Rh type and screen    Narrative    The following orders were created for panel order ABO/Rh type and screen.  Procedure                               Abnormality         Status                     ---------                               -----------         ------                     Adult Type and Screen[[id number]]                           Final result                 Please view results for these tests on the individual orders.   Comprehensive metabolic panel   Result Value Ref Range    Sodium 133 (L) 135 - 145 mmol/L    Potassium 3.0 (L) 3.4 - 5.3 mmol/L    Carbon Dioxide (CO2) 22 22 - 29 mmol/L    Anion Gap 10 7 - 15 mmol/L    Urea Nitrogen 10.7 6.0 - 20.0 mg/dL    Creatinine 0.66 0.51 - 0.95 mg/dL    GFR Estimate >90 >60 mL/min/1.73m2    Calcium 8.5 (L) 8.8 - 10.4 mg/dL    Chloride 101 98 - 107 mmol/L    Glucose 136 (H) 70 - 99 mg/dL    Alkaline Phosphatase 106 40 - 150  U/L    AST 8 0 - 45 U/L    ALT 7 0 - 50 U/L    Protein Total 6.1 (L) 6.4 - 8.3 g/dL    Albumin 3.3 (L) 3.5 - 5.2 g/dL    Bilirubin Total 0.4 <=1.2 mg/dL   Magnesium   Result Value Ref Range    Magnesium 1.8 1.7 - 2.3 mg/dL   Lactate Dehydrogenase (aka LDH)   Result Value Ref Range    Lactate Dehydrogenase 163 0 - 250 U/L   Fibrinogen activity   Result Value Ref Range    Fibrinogen Activity 577 (H) 170 - 510 mg/dL   INR   Result Value Ref Range    INR 1.08 0.85 - 1.15   Partial thromboplastin time   Result Value Ref Range    aPTT 31 22 - 38 Seconds   Phosphorus   Result Value Ref Range    Phosphorus 3.2 2.5 - 4.5 mg/dL   Uric acid   Result Value Ref Range    Uric Acid 2.9 2.4 - 5.7 mg/dL   CBC with platelets and differential   Result Value Ref Range    WBC Count 0.7 (LL) 4.0 - 11.0 10e3/uL    RBC Count 2.09 (L) 3.80 - 5.20 10e6/uL    Hemoglobin 7.0 (L) 11.7 - 15.7 g/dL    Hematocrit 19.6 (L) 35.0 - 47.0 %    MCV 94 78 - 100 fL    MCH 33.5 (H) 26.5 - 33.0 pg    MCHC 35.7 31.5 - 36.5 g/dL    RDW 16.7 (H) 10.0 - 15.0 %    Platelet Count 17 (LL) 150 - 450 10e3/uL   Adult Type and Screen   Result Value Ref Range    ABO/RH(D) A POS     Antibody Screen Negative Negative    SPECIMEN EXPIRATION DATE [id number]    RBC and Platelet Morphology   Result Value Ref Range    RBC Morphology Confirmed RBC Indices     Platelet Assessment  Automated Count Confirmed. Platelet morphology is normal.     Automated Count Confirmed. Platelet morphology is normal.   Manual Differential   Result Value Ref Range    % Neutrophils 12 %    % Lymphocytes 86 %    % Monocytes 0 %    % Eosinophils 2 %    % Basophils 0 %    Absolute Neutrophils 0.1 (LL) 1.6 - 8.3 10e3/uL    Absolute Lymphocytes 0.6 (L) 0.8 - 5.3 10e3/uL    Absolute Monocytes 0.0 0.0 - 1.3 10e3/uL    Absolute Eosinophils 0.0 0.0 - 0.7 10e3/uL    Absolute Basophils 0.0 0.0 - 0.2 10e3/uL   Lipase   Result Value Ref Range    Lipase 31 13 - 60 U/L   CONDITIONAL Prepare red blood cells  (unit)   Result Value Ref Range    Blood Component Type Red Blood Cells     Product Code S6942N48     Unit Status Transfused     Unit Number [id number]     CROSSMATCH Compatible     CODING SYSTEM [id number]     ISSUE DATE AND TIME [id number]     UNIT ABO/RH A+     UNIT TYPE ISBT 6200

## 2025-03-19 NOTE — PROGRESS NOTES
Cross Cover:    Called regarding new neutropenic fever, temp 100.6. Nurse to release lactic acid and blood culture orders. Ordered COVID/Flu/RSV, RVP, UA/UC, CXR, and cefepime 2g q8h, held prophy keshia.     CROW Cole MD  Internal Medicine-Pediatrics

## 2025-03-19 NOTE — PLAN OF CARE
"Goal Outcome Evaluation:      Plan of Care Reviewed With: patient    Overall Patient Progress: no changeOverall Patient Progress: no change    Outcome Evaluation: C1D15 of 7 + 3    0700 - 1530:   /73 (BP Location: Left arm)   Pulse 80   Temp 98.2  F (36.8  C) (Oral)   Resp 18   Ht 1.626 m (5' 4\")   Wt 77.8 kg (171 lb 8 oz)   LMP 01/01/2007 (Approximate)   SpO2 98%   BMI 29.44 kg/m      Potassium recheck is 3.8  Pt received 1 unit of RBC & tolerate well.  Trigered SIR, LA 1.3  A&O x 4, Tmax 99,  OVSS.   Pt had bmbx today, site is CDI.   UAL, voiding spontaneously, had a bm today per pt.  Continue with poc....      "

## 2025-03-19 NOTE — PROCEDURES
Bone Marrow Biopsy Procedure Note  Alejandra Villegas  March 19, 2025    PROCEDURE: Unilateral Bone Marrow Biopsy    INDICATION: AML    PERFORMED BY: Andrey Crowell PA-C     CONSENT:  Informed consent was obtained from the patient. The risks and benefits of the procedure were explained. The patient agreed to undergo the procedure. The consent form was signed and placed in the chart.    PROCEDURE SUMMARY:  The patient's identification was positively verified by verbal identification. Following the administration of 1 mg IV Versed for comfort, the patient was laid in the prone position. The right posterior iliac crest was prepped and draped in a sterile manner. The skin, deeper tissues, and periosteum of the RPIC were anesthetized with approximately 10mL 1% lidocaine. Following this, a 3mm incision was made. The Jamshidi needle was advanced into the RPIC bone cavity and a 14 mm core biopsy was taken. Bone marrow aspirates were also obtained from the RPIC; approximately 15 mL of marrow were aspirated. Following the procedure, a sterile pressure dressing was applied to the bone marrow biopsy site.     COMPLICATIONS:  None. The patient tolerated the procedure well with no discomfort.      RECOMMENDATIONS:   The patient was placed in the supine position to maintain pressure on the biopsy site.   The patient was instructed to lie flat for 60 minutes and not remove dressing or bathe/shower for 24 hours post-procedure.     TESTS ORDERED:  Morphology  Flow cytometry  Molecular (hold)    Nery Crowell PA-C  Hematology/Oncology  Pager: [phone]

## 2025-03-19 NOTE — PLAN OF CARE
Goal Outcome Evaluation:    Plan of Care Reviewed With: patient    Overall Patient Progress: no change    Outcome Evaluation: C1D15 of 7+3 = 3/19    [id number]    A/Ox4. UAL. Tmax = 100.6, OVSS. Patient triggered sepsis @ 1930. Blood cultures, lactic acid, CXR, UA, and resp/covid-19/influenza swabs completed. Cefepime infusion x2 completed via PICC. Lactic result = 0.7, repeat lactic due 3/19 @ 2120.    Intermittent headaches w pain rating up to a 4 this shift, managed w PRN tylenol, given x2. PRN atarax given x1 for anxiety.    Patient can be observed walking the unit/going outside intermittently. Voiding adequately, last BM 3/17. Denies nausea and SOB.     Potassium replacement protocol ran for a K = 3.0. Oral tablets given for replacement, redraw due 3/19 @ 0930. 1 unit of RBC transfusing.    Plan for mid-cycle BMBx today, 3/19 @ 1100.

## 2025-03-20 VITALS
OXYGEN SATURATION: 95 % | HEIGHT: 64 IN | TEMPERATURE: 97.9 F | HEART RATE: 79 BPM | WEIGHT: 170.9 LBS | BODY MASS INDEX: 29.18 KG/M2 | RESPIRATION RATE: 18 BRPM | DIASTOLIC BLOOD PRESSURE: 70 MMHG | SYSTOLIC BLOOD PRESSURE: 122 MMHG

## 2025-03-20 LAB
ALBUMIN SERPL BCG-MCNC: 3.3 G/DL (ref 3.5–5.2)
ALP SERPL-CCNC: 102 U/L (ref 40–150)
ALT SERPL W P-5'-P-CCNC: 7 U/L (ref 0–50)
ANION GAP SERPL CALCULATED.3IONS-SCNC: 9 MMOL/L (ref 7–15)
AST SERPL W P-5'-P-CCNC: 9 U/L (ref 0–45)
ATRIAL RATE - MUSE: 81 BPM
BACTERIA BLD CULT: NORMAL
BACTERIA UR CULT: NORMAL
BASOPHILS # BLD AUTO: 0 10E3/UL (ref 0–0.2)
BASOPHILS NFR BLD AUTO: 0 %
BILIRUB SERPL-MCNC: 0.4 MG/DL
BLD PROD TYP BPU: NORMAL
BLOOD COMPONENT TYPE: NORMAL
BUN SERPL-MCNC: 9.6 MG/DL (ref 6–20)
CALCIUM SERPL-MCNC: 8.8 MG/DL (ref 8.8–10.4)
CHLORIDE SERPL-SCNC: 108 MMOL/L (ref 98–107)
CODING SYSTEM: NORMAL
CREAT SERPL-MCNC: 0.63 MG/DL (ref 0.51–0.95)
DIASTOLIC BLOOD PRESSURE - MUSE: NORMAL MMHG
EGFRCR SERPLBLD CKD-EPI 2021: >90 ML/MIN/1.73M2
ELLIPTOCYTES BLD QL SMEAR: SLIGHT
EOSINOPHIL # BLD AUTO: 0 10E3/UL (ref 0–0.7)
EOSINOPHIL NFR BLD AUTO: 0 %
ERYTHROCYTE [DISTWIDTH] IN BLOOD BY AUTOMATED COUNT: 16.3 % (ref 10–15)
GLUCOSE SERPL-MCNC: 112 MG/DL (ref 70–99)
HCO3 SERPL-SCNC: 22 MMOL/L (ref 22–29)
HCT VFR BLD AUTO: 21.5 % (ref 35–47)
HGB BLD-MCNC: 7.6 G/DL (ref 11.7–15.7)
IMM GRANULOCYTES # BLD: 0 10E3/UL
IMM GRANULOCYTES NFR BLD: 0 %
INTERPRETATION ECG - MUSE: NORMAL
INTERPRETATION SERPL IEP-IMP: NORMAL
ISSUE DATE AND TIME: NORMAL
LDH SERPL L TO P-CCNC: 160 U/L (ref 0–250)
LYMPHOCYTES # BLD AUTO: 0.7 10E3/UL (ref 0.8–5.3)
LYMPHOCYTES NFR BLD AUTO: 92 %
MAGNESIUM SERPL-MCNC: 2 MG/DL (ref 1.7–2.3)
MCH RBC QN AUTO: 32.1 PG (ref 26.5–33)
MCHC RBC AUTO-ENTMCNC: 35.3 G/DL (ref 31.5–36.5)
MCV RBC AUTO: 91 FL (ref 78–100)
MONOCYTES # BLD AUTO: 0 10E3/UL (ref 0–1.3)
MONOCYTES NFR BLD AUTO: 3 %
NEUTROPHILS # BLD AUTO: 0 10E3/UL (ref 1.6–8.3)
NEUTROPHILS NFR BLD AUTO: 5 %
NRBC # BLD AUTO: 0 10E3/UL
NRBC BLD AUTO-RTO: 0 /100
P AXIS - MUSE: 56 DEGREES
PATH REPORT.COMMENTS IMP SPEC: NORMAL
PATH REPORT.FINAL DX SPEC: NORMAL
PATH REPORT.FINAL DX SPEC: NORMAL
PATH REPORT.GROSS SPEC: NORMAL
PATH REPORT.MICROSCOPIC SPEC OTHER STN: NORMAL
PATH REPORT.RELEVANT HX SPEC: NORMAL
PATH REPORT.RELEVANT HX SPEC: NORMAL
PHOSPHATE SERPL-MCNC: 3.3 MG/DL (ref 2.5–4.5)
PLAT MORPH BLD: ABNORMAL
PLATELET # BLD AUTO: 10 10E3/UL (ref 150–450)
POTASSIUM SERPL-SCNC: 3.9 MMOL/L (ref 3.4–5.3)
PR INTERVAL - MUSE: 128 MS
PROT SERPL-MCNC: 6.2 G/DL (ref 6.4–8.3)
QRS DURATION - MUSE: 70 MS
QT - MUSE: 354 MS
QTC - MUSE: 411 MS
R AXIS - MUSE: 19 DEGREES
RBC # BLD AUTO: 2.37 10E6/UL (ref 3.8–5.2)
RBC MORPH BLD: ABNORMAL
SODIUM SERPL-SCNC: 139 MMOL/L (ref 135–145)
SYSTOLIC BLOOD PRESSURE - MUSE: NORMAL MMHG
T AXIS - MUSE: 48 DEGREES
UNIT ABO/RH: NORMAL
UNIT NUMBER: NORMAL
UNIT STATUS: NORMAL
UNIT TYPE ISBT: 5100
URATE SERPL-MCNC: 2.7 MG/DL (ref 2.4–5.7)
VENTRICULAR RATE- MUSE: 81 BPM
VORICONAZOLE SERPL-MCNC: 0.2 UG/ML (ref 1–5.5)
WBC # BLD AUTO: 0.8 10E3/UL (ref 4–11)

## 2025-03-20 PROCEDURE — 83615 LACTATE (LD) (LDH) ENZYME: CPT

## 2025-03-20 PROCEDURE — 84100 ASSAY OF PHOSPHORUS: CPT | Performed by: PHYSICIAN ASSISTANT

## 2025-03-20 PROCEDURE — 99233 SBSQ HOSP IP/OBS HIGH 50: CPT

## 2025-03-20 PROCEDURE — 84550 ASSAY OF BLOOD/URIC ACID: CPT | Performed by: PHYSICIAN ASSISTANT

## 2025-03-20 PROCEDURE — 80053 COMPREHEN METABOLIC PANEL: CPT

## 2025-03-20 PROCEDURE — 99418 PROLNG IP/OBS E/M EA 15 MIN: CPT | Performed by: INTERNAL MEDICINE

## 2025-03-20 PROCEDURE — 99233 SBSQ HOSP IP/OBS HIGH 50: CPT | Mod: FS | Performed by: INTERNAL MEDICINE

## 2025-03-20 PROCEDURE — 250N000013 HC RX MED GY IP 250 OP 250 PS 637: Performed by: HOSPITALIST

## 2025-03-20 PROCEDURE — 83735 ASSAY OF MAGNESIUM: CPT

## 2025-03-20 PROCEDURE — 250N000013 HC RX MED GY IP 250 OP 250 PS 637

## 2025-03-20 PROCEDURE — 250N000011 HC RX IP 250 OP 636

## 2025-03-20 PROCEDURE — P9037 PLATE PHERES LEUKOREDU IRRAD: HCPCS

## 2025-03-20 PROCEDURE — 250N000011 HC RX IP 250 OP 636: Performed by: STUDENT IN AN ORGANIZED HEALTH CARE EDUCATION/TRAINING PROGRAM

## 2025-03-20 PROCEDURE — 120N000002 HC R&B MED SURG/OB UMMC

## 2025-03-20 PROCEDURE — 80285 DRUG ASSAY VORICONAZOLE: CPT

## 2025-03-20 PROCEDURE — 85025 COMPLETE CBC W/AUTO DIFF WBC: CPT

## 2025-03-20 RX ORDER — MAGNESIUM HYDROXIDE/ALUMINUM HYDROXICE/SIMETHICONE 120; 1200; 1200 MG/30ML; MG/30ML; MG/30ML
30 SUSPENSION ORAL ONCE
Status: COMPLETED | OUTPATIENT
Start: 2025-03-20 | End: 2025-03-20

## 2025-03-20 RX ORDER — DIPHENHYDRAMINE HYDROCHLORIDE AND LIDOCAINE HYDROCHLORIDE AND ALUMINUM HYDROXIDE AND MAGNESIUM HYDRO
10 KIT EVERY 6 HOURS PRN
Status: DISCONTINUED | OUTPATIENT
Start: 2025-03-20 | End: 2025-04-06 | Stop reason: HOSPADM

## 2025-03-20 RX ORDER — SUCRALFATE ORAL 1 G/10ML
1 SUSPENSION ORAL
Status: DISCONTINUED | OUTPATIENT
Start: 2025-03-20 | End: 2025-04-06 | Stop reason: HOSPADM

## 2025-03-20 RX ADMIN — ACETAMINOPHEN 650 MG: 325 TABLET, FILM COATED ORAL at 06:26

## 2025-03-20 RX ADMIN — DIPHENHYDRAMINE HYDROCHLORIDE AND LIDOCAINE HYDROCHLORIDE AND ALUMINUM HYDROXIDE AND MAGNESIUM HYDRO 10 ML: KIT at 14:35

## 2025-03-20 RX ADMIN — DIPHENHYDRAMINE HYDROCHLORIDE AND LIDOCAINE HYDROCHLORIDE AND ALUMINUM HYDROXIDE AND MAGNESIUM HYDRO 10 ML: KIT at 20:25

## 2025-03-20 RX ADMIN — PAROXETINE HYDROCHLORIDE 20 MG: 20 TABLET, FILM COATED ORAL at 07:54

## 2025-03-20 RX ADMIN — HYDROXYZINE HYDROCHLORIDE 25 MG: 25 TABLET, FILM COATED ORAL at 02:16

## 2025-03-20 RX ADMIN — HEPARIN, PORCINE (PF) 10 UNIT/ML INTRAVENOUS SYRINGE 5 ML: at 12:41

## 2025-03-20 RX ADMIN — Medication 1000 UNITS: at 07:55

## 2025-03-20 RX ADMIN — PANTOPRAZOLE SODIUM 40 MG: 40 TABLET, DELAYED RELEASE ORAL at 17:13

## 2025-03-20 RX ADMIN — SUCRALFATE ORAL 1 G: 1 SUSPENSION ORAL at 21:29

## 2025-03-20 RX ADMIN — Medication 5 ML: at 07:54

## 2025-03-20 RX ADMIN — CEFEPIME 2 G: 2 INJECTION, POWDER, FOR SOLUTION INTRAVENOUS at 11:56

## 2025-03-20 RX ADMIN — CEFEPIME 2 G: 2 INJECTION, POWDER, FOR SOLUTION INTRAVENOUS at 03:44

## 2025-03-20 RX ADMIN — HEPARIN, PORCINE (PF) 10 UNIT/ML INTRAVENOUS SYRINGE 5 ML: at 22:29

## 2025-03-20 RX ADMIN — FLUTICASONE PROPIONATE 2 SPRAY: 50 SPRAY, METERED NASAL at 07:54

## 2025-03-20 RX ADMIN — ACETAMINOPHEN 650 MG: 325 TABLET, FILM COATED ORAL at 21:29

## 2025-03-20 RX ADMIN — SUCRALFATE ORAL 1 G: 1 SUSPENSION ORAL at 11:56

## 2025-03-20 RX ADMIN — ALUMINUM HYDROXIDE, MAGNESIUM HYDROXIDE, AND DIMETHICONE 30 ML: 200; 20; 200 SUSPENSION ORAL at 02:50

## 2025-03-20 RX ADMIN — ACETAMINOPHEN 650 MG: 325 TABLET, FILM COATED ORAL at 17:17

## 2025-03-20 RX ADMIN — PANTOPRAZOLE SODIUM 40 MG: 40 TABLET, DELAYED RELEASE ORAL at 07:55

## 2025-03-20 RX ADMIN — FAMOTIDINE 20 MG: 20 TABLET, FILM COATED ORAL at 21:29

## 2025-03-20 RX ADMIN — LORATADINE 10 MG: 10 TABLET ORAL at 07:55

## 2025-03-20 RX ADMIN — FLUTICASONE FUROATE AND VILANTEROL TRIFENATATE 1 PUFF: 100; 25 POWDER RESPIRATORY (INHALATION) at 07:54

## 2025-03-20 RX ADMIN — HYDROXYZINE HYDROCHLORIDE 25 MG: 25 TABLET, FILM COATED ORAL at 14:34

## 2025-03-20 RX ADMIN — SUCRALFATE ORAL 1 G: 1 SUSPENSION ORAL at 07:55

## 2025-03-20 RX ADMIN — ACYCLOVIR 400 MG: 400 TABLET ORAL at 07:55

## 2025-03-20 RX ADMIN — CEFEPIME 2 G: 2 INJECTION, POWDER, FOR SOLUTION INTRAVENOUS at 21:29

## 2025-03-20 RX ADMIN — ACYCLOVIR 400 MG: 400 TABLET ORAL at 21:29

## 2025-03-20 RX ADMIN — POSACONAZOLE 300 MG: 100 TABLET, DELAYED RELEASE ORAL at 07:54

## 2025-03-20 RX ADMIN — SUCRALFATE ORAL 1 G: 1 SUSPENSION ORAL at 17:13

## 2025-03-20 RX ADMIN — FAMOTIDINE 20 MG: 20 TABLET, FILM COATED ORAL at 07:55

## 2025-03-20 ASSESSMENT — ACTIVITIES OF DAILY LIVING (ADL)
ADLS_ACUITY_SCORE: 33

## 2025-03-20 NOTE — PLAN OF CARE
Goal Outcome Evaluation:      Plan of Care Reviewed With: patient    Overall Patient Progress: no changeOverall Patient Progress: no change    Outcome Evaluation: C1D16 7+3    [id number]    Tmax 99.1, VSS on RA. Epigastric pain managed with prn atarax and one time dose of GI cocktail, effective. Pt denies n/v, sob, dizziness. Pt continues on iv cefepime. Platelets given this morning, pt tolerated well. Pt intermittently diaphoretic overnight. Bmbx dressing c/d/I. UAL. Continue poc.

## 2025-03-20 NOTE — PROGRESS NOTES
[facility] - [facility]    Hematology / Oncology Progress Note  Hospital Day # 22  Date of Service (when I saw the patient): 03/20/2025     Assessment & Plan   Alejandra Villegas is a 59 year old female with a past medical history significant for COPD, migraines, rheumatoid arthritis, aortic stenosis, Afib, and anxiety who presented to an outside hospital with cytopenias and was found on BMBx to have hypercellular marrow with 4% blasts, 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 She was subsequently admitted to [facility] on 2/26/25 for further work-up and management and pathology re-review was most consistent with AML with NPM1 mutation by WHO 2022 (which does not require a specific blast threshold). Following further multidisciplinary discussion, she started intensive induction with 7+3 (D1=3/5/25).  Her course has been complicated by neutropenic fevers, influenza A, AOM, and L TM perforation.     Today:  - D16 from 7+3 induction.   - Midcycle marrow completed yesterday, await results. Anticipate tomorrow, sent for flow, morph, process & hold   - Neutropenic fever Tmax 100.6 overnight the previous night, afebrile x24 hours. Infectious workup bland, UCx and Bcx NGTD.   - Continue IV Cefepime 2 g q8h (3/19-X)  - Monitor for fever recurrence  - Epigastric pain improved with increasing PPI to BID and GI cocktail overnight. CT chest/abd with esophagitis and small hiatal hernia. GI following; appreciate recs.  - PLTs 10k on AM labs. Received 1 unit PLTs.   - Continue with best supportive cares.     HEME  # AML with NPM1 mutation  Had been seen by PCP Dec 2024 w/ progressive fatigue and nausea where she was found leukopenic WBC  2.4 and neutropenic w/ ANC 0.3. Hgb 12. Was referred to local hematology/oncology clinic for further evaluation and seen by Dr. Harris. BMBx 2/10/25 done at OSH showed hypercellular marrow w/ trilineage hematopoiesis w/ dyspoiesis with mildly  elevated blasts of 4% on morphology. NGS w/ NPM1, IDH2, and NRAS mutations. She was admitted to [facility] 2/26/25 for further workup and management. Slides were re-reviewed by [facility] Hematopathology, who noted hypercellular marrow with 8% blasts by morphology. Despite relatively low blast percentage, findings were felt most consistent with a diagnosis AML with NPM1 mutation by WHO 2022 (which does not require a specific blast threshold). Following interdepartmental discussions and review of the available literature, patient was started on intensive induction with 7+3 (Day 1=3/5/25).   - PICC placed 3/5/2025, plan to discontinue on discharge.   - Baseline cardiopulmonary studies:  - Echo w/ EF 60-65%, mild known aortic stenosis.   - EKG (2/27) with NSR, QTc 412  - CXR (2/26) with mildly increased streaky bibasilar opacities, atelectasis or edema. No consolidation.   - Baseline viral serologies: CMV IgG+, EBV IgG+, HepB sAg-, HepB cAb-, HepB sAb-, HSV1+/2+, HIV-  - HLA Typing sent on admission. Message sent to notify BMT team x2/27 for IP consult.   - Midcycle marrow completed 3/19/25  - Follow for flow, morph       Treatment plan: 7+3 (C1D1=3/5/2025)    - Daunorubicin 60 mg/m  IV D1-3    - Cytarabine 100 mg/m  IV D1-7      - Pre-medications: zofran, dexamethasone 12 mg D1-3     # Pancytopenia  Secondary to underlying hematologic malignancy and exacerbated by recent chemotherapy.  - Follow daily CBC with differential  - Transfuse to keep Hgb >7, plt >10K  - Blood consent obtained 2/26/25 on admission and placed in patient's paper chart.      # Risk for TLS  # Hyperphosphatemia, resolved  Secondary to hematologic malignancy, no evidence of TLS on admission.   - Allopurinol 300 mg daily x7 days (through 3/11)  - Follow TLS labs daily    # Risk for DIC  Secondary to underlying hematologic malignancy.  - Follow coags every MWF  - Transfuse cryo to keep fibrinogen >100, FFP to keep INR <1.8     ID  # ID prophylaxis  - Acyclovir  400 mg BID  - Levaquin 500 mg daily  - Posaconazole 300 mg daily   - Vori w/ $1.60 copay, posa requiring PA also w/ $1.60 copay. Rotated from deny to vori (3/13-3/17) w/ completion of chemotherapy, then transitioned to posa x3/18 given visual hallucinations x3/17     # Neutropenic fever  # Influenza A, resolved  3/19: Tmax 100.6 overnight. Asymptomatic, although notes sweating upon waking the following morning. Started IV Cefepime 2 g q8h (3/19-X). CXR with scattered patchy densities, especially in the lung bases. Respiratory panel negative. UA with protein and blood, no sign of infection. UCx and BCx NGTDOf note, taking APAP daily for headaches which may be suppressing fevers.   - Continue to monitor for fever recurrence.  3/13: Febrile 100.4 overnight x3/13. No localizing/new symptoms. OVSS. Patient was wearing heated jacket that she attributed to temperature, no documentation noted of notifying cross cover MD. No further ID workup (BCx, UA, UC, or CXR) or broadening of abx completed. Given prolonged period on AM chart check x3/14 since documented fever and patient has remained afebrile and OVSS, will defer further workup/initiation of abx.   2/25: On admission, patient noted subjective fever with chills and rhinitis beginning 2/25 PM prior to admission. No other localizing s/sx of infection. She was afebrile on admission but spiked a low-grade fever to 100.7 on 2/26 PM. Blood and urine cultures negative, CXR with streaky opacities but no burt consolidation. Work-up positive for influenza A, and she completed a course of Tamiflu 75 mg BID x5 days (2/26-3/3). She also received a short empiric course of IV cefepime (2/26-3/1) given concurrent neutropenia.    # R AOM with purulent effusion, resolved  # Small L TM perforation, resolved  Patient noted B/L ear pain, L>R s/p drainage from R side on 3/3. Noted h/o recurrent AOMs. On exam, TMs appear full with erythema to outer edges, patient noted pain w/ exam though  able to tolerate. Small TM perforation noted to L side. Query if perforation from recurrent AOM or 2/2 congestion and fluid from recent influenza.  - ENT consulted, appreciate recs:  - Recommended 7-day course Augmentin (per cross-cover discussion with pharmacy, given h/o allergic reaction to Augmentin, increased dose of levofloxacin from 500 mg ? 750 mg daily (3/5-3/11) then plan to deescalate to ppx levaquin 500 mg daily.   - 5 day course of floxin drops to left ear for perforation (3/3-3/7)  - Follow up w/ PCP for left TM perforation - if persistent in 6-8 weeks recommend outpt ENT referral.     GI/  # GERD  # Epigastric pain  Reported recent increase in symptoms in the days leading up to admission and initiated famotidine.   - 3/9: Patient reported onset of midsternal chest pain that felt like reflux symptoms shortly after eating lunch.  EKG NSR.  States on 3/8, she experienced episode of emesis shortly after eating without preceding symptoms, no nausea at the time.  States this has happened before on occasion and has a history of this happening in the past.  No radiation of pain.  She also wondered if this may be related to the nicotine patch which she removed as she was also experiencing tachycardia, though had also been drinking energy drinks which she is trying to cut back on.  Pain is not reproducible on exam.  Also feels anxiety is attributing to some of her symptoms.  - 3/14: Ongoing GERD symptoms w/ epigastric pain, dull pain localized to epigastric region, worsened w/ N/V and anxiety. Rates 2-3/10, no TTP or reproducible symptoms. Denies melena, dark tarry stools, hematemesis w/ emetic episodes x3/13, or other sx c/f UGIB. Of note, AM Hgb 6.1, will transfuse 1 unit pRBCs and follow for recheck. Add daily PPI to regimen. If persistent/worsening then could consider GI consult.   - 3/19: Epigastric pain worsening today, no longer relieved with eructation or GERD precautions provided by SLP. EKG NSR,  Troponin and Lipase wnl. GI consulted for progressive pain, recommending increased pepcid/PPI with CT chest/abd to assess underlying etiology. Epigastric pain improved 3/20 with increasing PPI to BID and GI cocktail overnight. CT chest/abd with esophagitis and small hiatal hernia. GI following; appreciate recs.  - Famotidine 10 mg BID ? 20 mg BID x3/9  - SLP consulted 3/10 - Ok for regular diet w/ thin liquids, signed off 3/13  - Protonix 40 mg daily x3/14 ? BID x3/19  - Continue to encourage lifestyle modifications: avoid straws, carbonated beverages, GERD precautions  - GI cocktail prn  - TUMS prn    # Risk of malnutrition  Secondary to esophagitis/GERD/mucositis picture as above, patient intake has decreased to small servings, mostly eating soup. On regular adult diet with thin liquids per SLP. Recommended trial of high caloric liquids, patient tried Strawberry Ensure and plans to continue to order with meals.  - Consider RD consult for further dietary supplementation    # Nausea/vomiting, recurrent  Patient noted ongoing nausea w/ occasional vomiting starting Dec 2024. Has been managed with PRN Zofran.  Endorsed nausea on admission, now resolved.   - PRN Zofran and compazine  - Could consider trial of PRN Zyprexa if 3rd agent is desired/required     # Urinary frequency, resolved  # Dysuria, resolved  On admission, patient noted longstanding history of urinary frequency though new mild dysuria. Denies hematuria, bladder tenderness of CVA tenderness. UA (2/26) negative, UC with no growth. Symptoms have since resolved.     PULM  # COPD  Longstanding history of COPD. On admission patient reports symptoms are manageable and no recent exacerbations. Most recent PFTs (2018) were normal.  - Continue PTA Breo Ellipta inhaler and PRN DuoNebs      CV  #Non-radiating chest pain, resolved  Patient noted new non-radiating mid-sternal chest pain with associated reflux-like symptoms with anxiety x3/9 after eating lunch. EKG  "showed NSR. Noted x3/8, experiencing episode of emesis shortly after eating without preceding symptoms, no nausea at the time, and similar mid sternal discomfort that felt like her \"esophagus\".  States this has happened before on occasion and has a history of this happening in the past.  She also wondered if this may be related to the nicotine patch which she removed as she was also experiencing tachycardia, though had also been drinking energy drinks which she is trying to cut back on.  Pain not reproducible on exam.  Also feels anxiety is attributing to some of her symptoms as detailed below.     # Essential hypertension  - Continue PTA lisinopril - HELD 3/11 w/ soft BPs (90s-100s/50s-60s), resume pending daily trends     # Mixed hyperlipidemia  Lipid panel has remained at goal, 1/14/25 panel w/ cholesterol 163, , LDL 92, HDL 45.   - Held PTA atorvastatin on admission due to chemotherapy interactions; will also likely have DDI with azole once started. Consider rotation to rosuvastatin for better drug-drug interaction profile.     # H/o aortic stenosis  Patient noted on admission longstanding history of aortic stenosis. Pre-chemo echo w/ EF 60-65% and mild aortic stenosis and insufficiency. No previous echo to compare.      RENAL/FEN  # Stage 2 CKD  Baseline Cr ~ 0.7. On admission Cr 0.77.  - Continue to trend on daily labs and encourage PO hydration     # Mild hyponatremia, resolved  New mild hyponatremia noted 3/13 with Na 135 ? 130. Asymptomatic. Trial 1 L NS. If persistent/worsening then could consider initiation of salt tabs, hypertonic saline, and would obtain further workup with urine lytes.     NEURO  # Degenerative disc disease, L5-S1  - Continue PTA gabapentin PRN     # Chronic migraine w/o aura  # Chronic headaches  Present since childhood. Reportedly triggered by neck manipulation/movement. Reportedly well-managed with regimen below. Headaches occurring throughout admission with daily APAP use. " Present prior to admission, managed with APAP PRN at home. Patient denies acute worsening of headache or changing of characteristics.  - APAP PRN  - Continue PTA sumatriptan and cyclobenzaprine PRN  - Continue to monitor      ENDO  # Prediabetes  Last A1c 6.0 x12/9/24. Has been managing with lifestyle modifications.      MISC  # Rheumatoid arthritis   Patient reported trying treatment though was unable to tolerate well. Managed with Tylenol PRN. Most recent RF >650 (2/10/25). JI negative.       # BROOKS  # MDD  # At risk for adjustment disorder  Patient reports good control of anxiety/depressive symptoms prior to admission. Did note feeling overwhelmed by new diagnosis. On admission discussed inpatient services such as health psychology or cordelia, she respectfully declined though will consider. Ongoing discussion regarding symptoms of anxiety and depression continued throughout admission; patient endorses continuous chemo limiting accessibility to previously frequent trips outside has affected symptoms, feeling hopeful w/ cytarabine to complete tomorrow PM. Discussed increase in dose of Paxil and further d/w health psych; she declined at this time. C/w prn atarax and Paxil.   - Readdress health psych consult as indicated  - Continue PTA paroxetine   - Atarax as needed     # Vitamin D deficiency  Continue PTA vit D supplement    # Seasonal allergies   - Claritin 10 mg daily     # Tobacco use   Has smoked since age 16, 1.5 ppd (roughly 65 pack years). Attempting to cut back as recently as 01/2025 to 0.5 ppd. We discussed the risks of smoking during induction chemotherapy including increased risk for infection in setting of severe neutropenia that could further complicate course, even leading to death. She expressed understanding and appreciation of conversation.   - Encourage smoking cessation, readdress NRT as needed.   - Nicotine patch ordered 2/28. Patient had been intermittently declining patch and reported  associated increased symptoms of anxiety, and felt like patch precipitated anxiety attack w/ chest pain on 3/9. Discontinued 3/11, offered trial of lower dose patch or alternative NRT that patient declined.    - Discussed option of smoking cessation team, patient declined at this time.    Fluids/Electrolytes/Nutrition   - IVF prn   - PRN lyte replacement per standing protocol  - Regular diet as tolerated     Lines: PICC    PPX  VTE: TCP, Lovenox held 3/13  GI: Pepcid, Protonix  Bowels: prn senna and miralax  Activity: as tolerated    Code: DNR/DNI    Medically Ready for Discharge: Anticipated in 5+ Days    Disposition: Admitted for further workup and management. Discharge pending treatment decisions and clinical course.   Follow-up: Will need to request APPT18 closer to discharge date and determine primary oncologist.     65 MINUTES SPENT BY ME on the date of service doing chart review, history, exam, documentation & further activities per the note.      Staffed with Dr. Moreno.    Andrey Crowell PA-C  Hematology/Oncology  Pager [phone]    Interval History   No acute overnight events. Nursing notes reviewed.      Alejandra reports feeling much improved this morning. She reports that increasing PPI and use of GI cocktail allowed her to fall asleep quickly due to symptomatic improvement. Discussed CT chest/abd results with patient and reviewed GI recommendations, to which she is agreeable. Denies pain at bone marrow biopsy site. Discussed that we are still awaiting results from the biopsy. Denies chest pain, shortness of breath, cough, congestion, nausea/vomiting/diarrhea. Endorses stress incontinence since having kids, wears pads. Continues to wake up sweating but denies fever recurrence as supported by chart review. All concerns were addressed and questions were answered.    A comprehensive ROS was performed and was negative except as detailed in the HPI above.     Physical Exam   Temp: 98  F (36.7  C) Temp src: Oral BP:  "117/75 Pulse: 77   Resp: 20 SpO2: 95 % O2 Device: None (Room air)    Vitals:    03/18/25 0823 03/19/25 0836 03/20/25 0755   Weight: 76.8 kg (169 lb 4.8 oz) 77.8 kg (171 lb 8 oz) 77.5 kg (170 lb 14.4 oz)     Vital Signs with Ranges  Temp:  [97.5  F (36.4  C)-99.1  F (37.3  C)] 98  F (36.7  C)  Pulse:  [72-90] 77  Resp:  [18-20] 20  BP: (101-130)/(61-86) 117/75  SpO2:  [95 %-98 %] 95 %  I/O last 3 completed shifts:  In: 915 [P.O.:290; I.V.:100]  Out: 1850 [Urine:1850]    Physical Exam:    Blood pressure 117/75, pulse 77, temperature 98  F (36.7  C), temperature source Oral, resp. rate 20, height 1.626 m (5' 4\"), weight 77.5 kg (170 lb 14.4 oz), last menstrual period 01/01/2007, SpO2 95%, not currently breastfeeding.    Constitutional: Pleasant and cooperative female, in NAD. Alert, interactive, non-toxic. Smiling and conversational.  HEENT: NC/AT, sclera clear, conjunctiva normal, OP with MMM, no discrete intraoral lesions or thrush, poor dentition  Respiratory: No increased work of breathing, CTAB, no crackles or wheezing.  Cardiovascular: RRR, systolic murmur. No peripheral edema. No calf tenderness or palpable cords.  GI: Normal bowel sounds. Abdomen is soft, non-distended and non-tender to palpation.  Skin: Warm, dry, well-perfused. No bruising, bleeding, rashes, or lesions on limited exam. BMBx site bandaged.  Musculoskeletal: Diminished muscle bulk, no gross deformities.  Neurologic: A&O. Answers questions appropriately, speech normal. Moves all extremities spontaneously.  Psychiatric: Normal mood and affect.  Vascular access:  PICC on RUE CDI, non-tender, no surrounding erythema.    Medications   Current Facility-Administered Medications   Medication Dose Route Frequency Provider Last Rate Last Admin     Current Facility-Administered Medications   Medication Dose Route Frequency Provider Last Rate Last Admin    acyclovir (ZOVIRAX) tablet 400 mg  400 mg Oral BID Chelsie Murphy PA-C   400 mg at " 03/20/25 0755    ceFEPIme (MAXIPIME) 2 g vial to attach to  mL bag for ADULTS or NS 50 mL bag for PEDS  2 g Intravenous Q8H Fer Cole MD   2 g at 03/20/25 0344    famotidine (PEPCID) tablet 20 mg  20 mg Oral BID Zully Garcia PA-C   20 mg at 03/20/25 0755    fluticasone-vilanterol (BREO ELLIPTA) 100-25 MCG/ACT inhaler 1 puff  1 puff Inhalation Daily Chelsie Murphy PA-C   1 puff at 03/20/25 0754    heparin lock flush 10 unit/mL injection 5-20 mL  5-20 mL Intracatheter Q24H Chelsie Murphy PA-C   5 mL at 03/20/25 0754    [Held by provider] levofloxacin (LEVAQUIN) tablet 500 mg  500 mg Oral Daily Zully Garcia PA-C   500 mg at 03/18/25 0916    [Held by provider] lisinopril (ZESTRIL) tablet 10 mg  10 mg Oral Daily Jyoti Tenorio PA-C   10 mg at 03/10/25 1039    loratadine (CLARITIN) tablet 10 mg  10 mg Oral Daily Chelsie Murphy PA-C   10 mg at 03/20/25 0755    pantoprazole (PROTONIX) EC tablet 40 mg  40 mg Oral BID  Nery Crowell PA-C   40 mg at 03/20/25 0755    PARoxetine (PAXIL) tablet 20 mg  20 mg Oral [facility] Chelsie Murphy PA-C   20 mg at 03/20/25 0754    posaconazole (NOXAFIL) DR tablet TBEC 300 mg  300 mg Oral Nery Dubose PA-C   300 mg at 03/20/25 0754    sodium chloride (PF) 0.9% PF flush 10-40 mL  10-40 mL Intracatheter Q8H Chelsie Murphy PA-C   10 mL at 03/20/25 0755    sucralfate (CARAFATE) suspension 1 g  1 g Oral 4x Daily AC & HS Aman Cervantes MD   1 g at 03/20/25 0755    Vitamin D3 (CHOLECALCIFEROL) tablet 1,000 Units  1,000 Units Oral Daily Chelsie Murphy PA-C   1,000 Units at 03/20/25 0755     Data   Results for orders placed or performed during the hospital encounter of 02/26/25 (from the past 24 hours)   Troponin T, High Sensitivity   Result Value Ref Range    Troponin T, High Sensitivity 10 <=14 ng/L   Lactic Acid Whole Blood w/ 1x repeat in 2 hrs when >2    Result Value Ref Range    Lactic Acid, Initial 1.3 0.7 - 2.0 mmol/L   CT Chest Abdomen w Contrast    Narrative    EXAM: CT chest, abdomen, and pelvis with intravenous contrast.  3/19/2025 4:53 PM    HISTORY: 60 yo F with progressive epigastric pain despite lifestyle  and medication management, please assess for underlying etiology    TECHNIQUE: Helical acquisition of image data was performed for the  chest, abdomen, and pelvis with intravenous contrast.    COMPARISON: 6/20/2013    FINDINGS:    Lines and tubes: Right PICC tip in the right atrium.    CHEST:    Thyroid: Thyroid appears unremarkable.     Mediastinum: The heart is not enlarged. No pericardial effusion.  Thoracic aorta and main pulmonary artery are normal in caliber.     Lungs: Central airways are patent.  Mild bronchial wall thickening. No  suspicious pulmonary nodules or masses. No focal airspace  consolidation. Faint mosaic attenuation in the upper lobes. Minimal  groundglass and linear opacities in lung bases, likely atelectasis. No  pleural effusions. No pneumothorax.    Lower neck/axillae: No enlarged axillary, mediastinal, or hilar lymph  nodes by short axis criteria.       ABDOMEN/PELVIS:    Liver: No intrahepatic biliary dilatation. No focal hepatic mass.    Gallbladder/biliary tree: Cholecystectomy. The common bile duct is  within normal limits for    Pancreas: The pancreatic duct is not dilated.    Spleen: The spleen is not enlarged.    Adrenal glands: No adrenal nodules.    Kidneys/ureters: No hydronephrosis.     Bladder/pelvic organs: Surgically absent uterus and ovaries.    Bowel/mesentery: No dilated loops of small bowel or colon. Normal  appendix. No free air or free fluid. Surgical mesh in the anterior  right upper quadrant adjacent to the left hepatic lobe.    Esophagus/Stomach: Circumferential wall thickening in the distal  esophagus and at the gastroesophageal junction. Likely small hiatal  hernia.    Major vessels: Aneurysmal  dilatation of of the infrarenal aorta,  measuring 3.4 cm.     Lymph nodes: No enlarged abdominal or pelvic lymph nodes by short axis  criteria.    Soft Tissues: Left flank implanted sacral nerve stimulator.    Bones:  No acute or suspicious osseous abnormality. Mild degenerative  changes of the spine. Bone marrow biopsy changes in the posterior  right iliac bone.        Impression    IMPRESSION:    1. Wall thickening/edema at the distal esophagus and gastroesophageal  junction, suggestive of esophagitis. Likely small hiatal hernia.  2. Mild bronchial wall thickening and faint mosaic attenuation in the  lungs, which could represent bronchitis and small airways disease.  Minimal linear and patchy ground glass opacities in the lung bases are  favored to represent atelectasis, though infection remains a  consideration.  3. Fusiform infrarenal abdominal aortic aneurysm measuring up to 3.4  cm in diameter.    I have personally reviewed the examination and initial interpretation  and I agree with the findings.    ARNULFO TAN DO         SYSTEM ID:  [id number]   Blood Culture Hand, Right    Specimen: Hand, Right; Blood   Result Value Ref Range    Culture No growth after 12 hours    Blood Culture Hand, Left    Specimen: Hand, Left; Blood   Result Value Ref Range    Culture No growth after 12 hours    CBC with platelets differential    Narrative    The following orders were created for panel order CBC with platelets differential.  Procedure                               Abnormality         Status                     ---------                               -----------         ------                     CBC with platelets and ...[[id number]]  Abnormal            Final result               RBC and Platelet Morpho...[[id number]]  Abnormal            Final result                 Please view results for these tests on the individual orders.   Comprehensive metabolic panel   Result Value Ref Range    Sodium 139 135 - 145 mmol/L     Potassium 3.9 3.4 - 5.3 mmol/L    Carbon Dioxide (CO2) 22 22 - 29 mmol/L    Anion Gap 9 7 - 15 mmol/L    Urea Nitrogen 9.6 6.0 - 20.0 mg/dL    Creatinine 0.63 0.51 - 0.95 mg/dL    GFR Estimate >90 >60 mL/min/1.73m2    Calcium 8.8 8.8 - 10.4 mg/dL    Chloride 108 (H) 98 - 107 mmol/L    Glucose 112 (H) 70 - 99 mg/dL    Alkaline Phosphatase 102 40 - 150 U/L    AST 9 0 - 45 U/L    ALT 7 0 - 50 U/L    Protein Total 6.2 (L) 6.4 - 8.3 g/dL    Albumin 3.3 (L) 3.5 - 5.2 g/dL    Bilirubin Total 0.4 <=1.2 mg/dL   Magnesium   Result Value Ref Range    Magnesium 2.0 1.7 - 2.3 mg/dL   Lactate Dehydrogenase (aka LDH)   Result Value Ref Range    Lactate Dehydrogenase 160 0 - 250 U/L   Phosphorus   Result Value Ref Range    Phosphorus 3.3 2.5 - 4.5 mg/dL   Uric acid   Result Value Ref Range    Uric Acid 2.7 2.4 - 5.7 mg/dL   CBC with platelets and differential   Result Value Ref Range    WBC Count 0.8 (LL) 4.0 - 11.0 10e3/uL    RBC Count 2.37 (L) 3.80 - 5.20 10e6/uL    Hemoglobin 7.6 (L) 11.7 - 15.7 g/dL    Hematocrit 21.5 (L) 35.0 - 47.0 %    MCV 91 78 - 100 fL    MCH 32.1 26.5 - 33.0 pg    MCHC 35.3 31.5 - 36.5 g/dL    RDW 16.3 (H) 10.0 - 15.0 %    Platelet Count 10 (LL) 150 - 450 10e3/uL    % Neutrophils 5 %    % Lymphocytes 92 %    % Monocytes 3 %    % Eosinophils 0 %    % Basophils 0 %    % Immature Granulocytes 0 %    NRBCs per 100 WBC 0 <1 /100    Absolute Neutrophils 0.0 (LL) 1.6 - 8.3 10e3/uL    Absolute Lymphocytes 0.7 (L) 0.8 - 5.3 10e3/uL    Absolute Monocytes 0.0 0.0 - 1.3 10e3/uL    Absolute Eosinophils 0.0 0.0 - 0.7 10e3/uL    Absolute Basophils 0.0 0.0 - 0.2 10e3/uL    Absolute Immature Granulocytes 0.0 <=0.4 10e3/uL    Absolute NRBCs 0.0 10e3/uL   RBC and Platelet Morphology   Result Value Ref Range    RBC Morphology Confirmed RBC Indices     Platelet Assessment  Automated Count Confirmed. Platelet morphology is normal.     Automated Count Confirmed. Platelet morphology is normal.    Elliptocytes Slight (A) None  Seen   CONDITIONAL Prepare pheresed platelets (unit)   Result Value Ref Range    ISSUE DATE AND TIME [id number]     Blood Component Type Platelets     Product Code [id number]     Unit Status Transfused     Unit Number [id number]     UNIT ABO/RH O+     CODING SYSTEM [id number]     UNIT TYPE ISBT 5100

## 2025-03-20 NOTE — PLAN OF CARE
"Goal Outcome Evaluation:      Plan of Care Reviewed With: patient    Overall Patient Progress: no changeOverall Patient Progress: no change    0700 - 1930:   /64 (BP Location: Left arm, Cuff Size: Adult Regular)   Pulse 77   Temp 97.6  F (36.4  C) (Oral)   Resp 20   Ht 1.626 m (5' 4\")   Wt 77.5 kg (170 lb 14.4 oz)   LMP 01/01/2007 (Approximate)   SpO2 97%   BMI 29.33 kg/m      Today is D16 from 7 + 3 induction.  A&O x 4, AVSS, epigastric pain improved with GI cocktail, PPI & on scheduled sucralfate.   Gave atarax x 1 for anxiety.  Bmbx site CDI, PICC on HL.  UAL, voiding spontaneously, had a bm today per pt.  Continue to monitor for fever              "

## 2025-03-20 NOTE — PLAN OF CARE
"Goal Outcome Evaluation:      Plan of Care Reviewed With: patient    Overall Patient Progress: no changeOverall Patient Progress: no change    Outcome Evaluation: C1D15 7+3    [id number]    /71 (BP Location: Left arm)   Pulse 87   Temp 98.8  F (37.1  C) (Oral)   Resp 18   Ht 1.626 m (5' 4\")   Wt 77.8 kg (171 lb 8 oz)   LMP 01/01/2007 (Approximate)   SpO2 98%   BMI 29.44 kg/m      Reason for admission: C1D15 of 7+3  Activity: Independent.   Pain: 2/10. Reports chest pain, EKG and troponin labs completed during day shift. Abdominal CT w/contrast completed today.   Neuro: Aox4.   Cardiac: WDL.   Respiratory: WDL.   GI/: Voids to the bathroom. LBM 3/19.   Diet: Regular- tolerating well.   Lines: PICC heparin locked.   Wounds: BMBx site intact, no drainage/bleeding.  Labs/imaging: CT completed. Blood cultures via peripheral, pending results. BMBx completed today in the AM, pending results.       New changes this shift: Afebrile. Pt request that when pt is planned to discharge, contact family Bart 2 hours before to drive to pt.      Plan: Continue with count recovery.       Continue to monitor and follow POC   "

## 2025-03-20 NOTE — PROGRESS NOTES
"    GASTROENTEROLOGY PROGRESS and Sign-Off  NOTE  GI Luminal Service    Date of Admission: 2/26/2025  Reason for Admission: AML with NPM1 mutation, chemotherapy induction       ASSESSMENT:  Alejandra Villegas is a 59 year old female with a history of COPD with ongoing tobacco use, essential hypertension, mixed hyperlipidemia, stage 2 chronic kidney disease, migraines, rheumatoid arthritis, aortic stenosis, atrial fibrillation, congestive heart failure, degenerative disc disease, and anxiety, depression, vitamin D deficiency, bladder stimulator (unable to have MRIs), who presented to an outside hospital with cytopenias, found on bone marrow biopsy to have hypercellular marrow with 4% blasts, consistent with AML with NPM1, IDH2, and NRAS mutations, subsequently admitted to [facility] on 2/26/2025 for further workup and management, with [facility] hematopathology re-review of bone marrow with 8% blasts by morphology most consistent with AML with NPM1 mutation by WHO 2022 (which does not require specific blast threshold).  Following further multidisciplinary discussion, patient was started on intensive induction with 7+3 (Daunorubicin, Cytarabine with premedications ondansetron and dexamethasone) (D1=3/5/2025) with subsequent pancytopenia.  Patient's hospital course has been complicated by neutropenic fevers, influenza A, acute otitis media, left tympanic membrane perforation, electrolyte abnormalities including hyperphosphatemia.     The GI Luminal Service was consulted regarding: \"58 yo F smoker, worsening GERD/epigastric pain despite famotidine, PPI, GERD precautions with SLP. Please assess for further workup and management.\"        # Acute Substernal Chest Pain, intermittent progressed to constant, improved  # Acute Epigastric Pain, intermittent progressed to constant, improved  # Eructation, likely aerophagia with straw use +/- dyspepsia  # AML with NPM1 mutation on Chemotherapy  # Chemotherapy-Induced Pancytopenia, severe " neutropenia and thrombocytopenia  # Immunosuppressed Status  # Tobacco Use Disorder     Patient admitted with AML with NPM1 mutation on chemotherapy with subsequent pancytopenia and immunosuppressed status with ongoing daily tobacco use (1/2 pack per day cigarette smoking) who reports 1 week history of progressive acute substernal chest pain and epigastric pain that was initially intermittent, now progressed to constant 3/19, improved last evening with GI cocktail. Additionally endorses increased eructation in the setting of utilizing straws with drinking fluids; likely straw use leading to aerophagia and subsequent eructation.      Lipase within normal limits. No overt GI bleeding at this time. No previous GI endoscopies.      - Anti-acid medications: Pantoprazole 40 mg PO once daily that was increased to BID on 3/19, Famotidine 20 mg PO BID.   - 3/19: CT Chest/Abdomen/Pelvis with IV Contrast reports nonspecific wall thickening/edema at the distal esophagus and gastroesophageal junction suggestive of esophagitis with likely small hiatal hernia.   - 3/20: ANC 0.0 10e3/uL; Platelets 10 10e3/uL; hemoglobin 7.6 g/dL.      Acute progressive substernal and epigastric pain differential includes chemotherapy adverse effect (Daunorubicin - abdominal pain in 1-10% per UpToDate, less likely Cytarabine), mucositis related to chemotherapy and severe neutropenia, medication adverse effect, electrolyte abnormalities, peptic ulcer disease, GERD, gastropathy/gastritis, duodenitis, dyspepsia, musculoskeletal, post-infectious (recent Influenza A); potentially abdominal migraine with history of migraines; question nutritional deficiencies; less likely opportunistic infection given symptomatic improvement with PPI, H2 blocker and GI cocktail and denies odynophagia/dysphagia.     If patient fails conservative management, once ANC and platelets have recovered, please page the GI Luminal Service (listed on [facility]) for reassessment at that  time (discuss risks/benefits of EGD at that time to evaluate for underlying opportunistic infection in the setting of immunosuppressed status on chemotherapy).        Recommendations:  -- No acute indication for GI endoscopic intervention.   -- Avoid using straws to decrease aerophagia contributing to eructation.   -- Continue Pantoprazole to 40 mg orally twice daily and continue famotidine 20 mg orally twice daily for potential upper GI pathologies contributing to nonspecific chest and epigastric pain, concern for esophagitis on CT scan.  -- Please consider following an antireflux regimen. This includes:  - Do not lie down for at least 3 to 4 hours after meals.  - Raise the head of the bed 6 to 8 inches (35-45 degrees).  - Avoid foods and liquids that cause symptoms.  - Avoid cigarettes and other tobacco products.  -- Continue PRN GI cocktail.   -- Appreciate cardiac work-up to primary team.   -- Tobacco cessation.   -- Pancytopenia corrections per primary team.   -- Inpatient pharmacy consult to review medications administered to patient for adverse effects of chest pain, abdominal pain, dyspepsia (potentially medication adverse effect given 1 week history of this 3 week admission).   -- If ongoing non-specific epigastric pain despite PPI 40 mg BID and Famotidine 20 mg BID in the absence of concern for opportunistic infection, could consider trial of sumatriptan for potential abdominal migraine in the setting of history of chronic migraines.   -- Ongoing electrolyte optimization.   -- Ongoing nutritional optimization.  - Appreciate RD involvement and recommendations --> ?any nutritional deficiencies that should be considered as potential underlying etiologies. Additionally discussion regarding high calorie liquids and supplements (?magic cups) for ongoing nutritional optimization. ?Calorie counts.   -- Analgesia/Antiemetics per primary team.   -- Rest of cares per primary oncology team.   -- If the patient  experiences overt GI bleeding with hemodynamic instability, please page the GI Luminal Service (listed on [facility]).       Outpatient:  -- No outpatient GI clinic follow-up necessary.      COVID status: negative 3/18/2025.     Discussed with Andrey Crowell PA-C - Heme Malignancy team.     Thank you for involving us in this patient's care. Please do not hesitate to contact the GI service with any questions or concerns.     The patient was discussed and plan agreed upon with Dr. Josue Chris, GI Luminal Service staff physician.    Overall time spent on the date of this encounter preparing to see the patient (including chart review of available notes, clinical status events, imaging and labs); discussing, ordering, coordinating recommended medications, tests or procedures; communicating with other health care professionals; and documenting the above clinical information in the electronic medical record was 60 minutes.    Consuelo Valadez PA-C  Inpatient Gastroenterology Service  [facility]  Vocera   _______________________________________________________________      Subjective: Nursing notes and 24hr events reviewed.     Patient seen and examined at 0920.     Patient reports no nausea at this time.  Reports eating chicken no soup 3 times a day without issue.  Still no vomiting.  Denies abdominal pain, noting it is actually chest pain.  Discussed high-calorie liquids and consideration of calorie counts given limited oral intake.  Patient endorses a lot of burping, noting burping is somewhat painful.  Patient continues to use straws with liquids.  Discussed discontinuing usage of straws is likely swallowing air contributing to burping.    Patient reports receiving a GI cocktail last night that significantly helped with symptoms and allowed better sleep last night.  Feels the increase to BID pantoprazole is also helping.     Reviewed assessment and plan as above.  Patient expressed  "understanding and had no additional questions or concerns for the GI luminal service at this time.      ROS:   4 pt ROS negative unless noted in subjective.     Objective:  Blood pressure 117/75, pulse 77, temperature 98  F (36.7  C), temperature source Oral, resp. rate 20, height 1.626 m (5' 4\"), weight 77.5 kg (170 lb 14.4 oz), last menstrual period 01/01/2007, SpO2 95%, not currently breastfeeding.    General: 59 year old female lying in bed in NAD. Appears comfortable.  Answers appropriately.    HEENT: Head is AT/NC. Sclera anicteric. +alopecia.  Lungs: No increased work of breathing, speaking in full sentences, equal chest rise. On Room Air.   Heart: Regular rate. Peripheral perfusion intact.  Abdomen: Soft, non-distended, +mild epigastric tenderness without rebound or guarding. No peritoneal signs.  Extremities: WWP.  Musculoskeletal: No gross deformity.  Skin: No jaundice or rash on exposed skin.  Neurologic: Grossly non-focal.  CN 2-12 grossly intact.   Mental status/Psych: A&O. Asks/answers questions appropriately. Pleasant, interactive.      Previous GI Endoscopic Procedures:  -- No previous GI endoscopies on file. Patient confirms no previous GI endoscopies.      LABS:  BMP  Recent Labs   Lab 03/20/25  0212 03/19/25  1108 03/19/25  0248 03/18/25 0323 03/17/25  0335     --  133* 135 134*   POTASSIUM 3.9 3.8 3.0* 3.6 3.7   CHLORIDE 108*  --  101 102 102   TOBIN 8.8  --  8.5* 8.8 8.8   CO2 22  --  22 20* 23   BUN 9.6  --  10.7 11.4 11.1   CR 0.63  --  0.66 0.63 0.62   *  --  136* 116* 108*     CBC  Recent Labs   Lab 03/20/25  0212 03/19/25  0248 03/18/25  0323 03/17/25  0335   WBC 0.8* 0.7* 0.6* 0.5*   RBC 2.37* 2.09* 2.32* 1.96*   HGB 7.6* 7.0* 7.7* 6.8*   HCT 21.5* 19.6* 21.7* 18.7*   MCV 91 94 94 95   MCH 32.1 33.5* 33.2* 34.7*   MCHC 35.3 35.7 35.5 36.4   RDW 16.3* 16.7* 17.2* 16.9*   PLT 10* 17* 25* 43*     INR  Recent Labs   Lab 03/19/25  0248 03/17/25  0335 03/14/25  0421   INR 1.08 1.29* " 1.10     LFTs  Recent Labs   Lab 03/20/25  0212 03/19/25  0248 03/18/25  0323 03/17/25  0335   ALKPHOS 102 106 112 113   AST 9 8 10 10   ALT 7 7 9 8   BILITOTAL 0.4 0.4 0.7 0.6   PROTTOTAL 6.2* 6.1* 6.3* 6.3*   ALBUMIN 3.3* 3.3* 3.5 3.5      PANC  Recent Labs   Lab 03/19/25  0248   LIPASE 31         IMAGING:    CT chest, abdomen, and pelvis with intravenous contrast.  3/19/2025 4:53 PM     HISTORY: 60 yo F with progressive epigastric pain despite lifestyle  and medication management, please assess for underlying etiology     TECHNIQUE: Helical acquisition of image data was performed for the  chest, abdomen, and pelvis with intravenous contrast.     COMPARISON: 6/20/2013     FINDINGS:     Lines and tubes: Right PICC tip in the right atrium.     CHEST:     Thyroid: Thyroid appears unremarkable.      Mediastinum: The heart is not enlarged. No pericardial effusion.  Thoracic aorta and main pulmonary artery are normal in caliber.      Lungs: Central airways are patent.  Mild bronchial wall thickening. No  suspicious pulmonary nodules or masses. No focal airspace  consolidation. Faint mosaic attenuation in the upper lobes. Minimal  groundglass and linear opacities in lung bases, likely atelectasis. No  pleural effusions. No pneumothorax.     Lower neck/axillae: No enlarged axillary, mediastinal, or hilar lymph  nodes by short axis criteria.         ABDOMEN/PELVIS:     Liver: No intrahepatic biliary dilatation. No focal hepatic mass.     Gallbladder/biliary tree: Cholecystectomy. The common bile duct is  within normal limits for     Pancreas: The pancreatic duct is not dilated.     Spleen: The spleen is not enlarged.     Adrenal glands: No adrenal nodules.     Kidneys/ureters: No hydronephrosis.      Bladder/pelvic organs: Surgically absent uterus and ovaries.     Bowel/mesentery: No dilated loops of small bowel or colon. Normal  appendix. No free air or free fluid. Surgical mesh in the anterior  right upper quadrant  adjacent to the left hepatic lobe.     Esophagus/Stomach: Circumferential wall thickening in the distal  esophagus and at the gastroesophageal junction. Likely small hiatal  hernia.     Major vessels: Aneurysmal dilatation of the infrarenal aorta,  measuring 3.4 cm.      Lymph nodes: No enlarged abdominal or pelvic lymph nodes by short axis  criteria.     Soft Tissues: Left flank implanted sacral nerve stimulator.     Bones:  No acute or suspicious osseous abnormality. Mild degenerative  changes of the spine. Bone marrow biopsy changes in the posterior  right iliac bone.                                                                   IMPRESSION:  1. Wall thickening/edema at the distal esophagus and gastroesophageal  junction, suggestive of esophagitis. Likely small hiatal hernia.  2. Mild bronchial wall thickening and faint mosaic attenuation in the  lungs, which could represent bronchitis and small airways disease.  Minimal linear and patchy ground glass opacities in the lung bases are  favored to represent atelectasis, though infection remains a  consideration.  3. Fusiform infrarenal abdominal aortic aneurysm measuring up to 3.4  cm in diameter.        3/18/2025 Portable chest  INDICATION: Neutropenic fever     COMPARISON: To/26/25     FINDINGS: Scattered mild patchy densities in the lung bases slightly  more prominent may indicate edema, atelectasis or even  inflammation/infection a right PICC tip in the SVC. Atherosclerotic  calcification at the aortic knob. Heart size normal.                                                                      IMPRESSION: Scattered patchy densities especially in the lung bases  medically mild edema/atelectasis, recommend follow-up to clearing to  exclude infection.        XR CHEST PORT 1 VIEW  2/26/2025 8:48 PM   History:  Investigating any infectious etiology        Comparison:  1/23/2024     Findings: Single view of the chest.     Trachea is midline. The cardiomediastinal  silhouette is stable and  within normal limits. No significant pleural effusion or pneumothorax.  Streaky bibasilar pulmonary opacities are mildly increased. The  visualized upper abdomen is unremarkable. No acute bony abnormality.                                                                      Impression: Streaky bibasilar opacities, likely atelectasis or edema.  No consolidation.

## 2025-03-21 LAB
ALBUMIN SERPL BCG-MCNC: 3.3 G/DL (ref 3.5–5.2)
ALP SERPL-CCNC: 101 U/L (ref 40–150)
ALT SERPL W P-5'-P-CCNC: 9 U/L (ref 0–50)
ANION GAP SERPL CALCULATED.3IONS-SCNC: 11 MMOL/L (ref 7–15)
APTT PPP: 28 SECONDS (ref 22–38)
AST SERPL W P-5'-P-CCNC: 10 U/L (ref 0–45)
BASOPHILS # BLD AUTO: 0 10E3/UL (ref 0–0.2)
BASOPHILS NFR BLD AUTO: 0 %
BILIRUB SERPL-MCNC: 0.3 MG/DL
BLD PROD TYP BPU: NORMAL
BLOOD COMPONENT TYPE: NORMAL
BUN SERPL-MCNC: 9.2 MG/DL (ref 6–20)
CALCIUM SERPL-MCNC: 8.6 MG/DL (ref 8.8–10.4)
CHLORIDE SERPL-SCNC: 105 MMOL/L (ref 98–107)
CODING SYSTEM: NORMAL
CREAT SERPL-MCNC: 0.56 MG/DL (ref 0.51–0.95)
CROSSMATCH: NORMAL
EGFRCR SERPLBLD CKD-EPI 2021: >90 ML/MIN/1.73M2
ELLIPTOCYTES BLD QL SMEAR: SLIGHT
EOSINOPHIL # BLD AUTO: 0 10E3/UL (ref 0–0.7)
EOSINOPHIL NFR BLD AUTO: 0 %
ERYTHROCYTE [DISTWIDTH] IN BLOOD BY AUTOMATED COUNT: 15.9 % (ref 10–15)
FIBRINOGEN PPP-MCNC: 523 MG/DL (ref 170–510)
GLUCOSE SERPL-MCNC: 113 MG/DL (ref 70–99)
HCO3 SERPL-SCNC: 22 MMOL/L (ref 22–29)
HCT VFR BLD AUTO: 18.8 % (ref 35–47)
HGB BLD-MCNC: 6.8 G/DL (ref 11.7–15.7)
IMM GRANULOCYTES # BLD: 0 10E3/UL
IMM GRANULOCYTES NFR BLD: 0 %
INR PPP: 1.08 (ref 0.85–1.15)
ISSUE DATE AND TIME: NORMAL
LDH SERPL L TO P-CCNC: 153 U/L (ref 0–250)
LYMPHOCYTES # BLD AUTO: 0.6 10E3/UL (ref 0.8–5.3)
LYMPHOCYTES NFR BLD AUTO: 94 %
MAGNESIUM SERPL-MCNC: 1.9 MG/DL (ref 1.7–2.3)
MCH RBC QN AUTO: 32.9 PG (ref 26.5–33)
MCHC RBC AUTO-ENTMCNC: 36.2 G/DL (ref 31.5–36.5)
MCV RBC AUTO: 91 FL (ref 78–100)
MONOCYTES # BLD AUTO: 0 10E3/UL (ref 0–1.3)
MONOCYTES NFR BLD AUTO: 5 %
NEUTROPHILS # BLD AUTO: 0 10E3/UL (ref 1.6–8.3)
NEUTROPHILS NFR BLD AUTO: 2 %
NRBC # BLD AUTO: 0 10E3/UL
NRBC BLD AUTO-RTO: 0 /100
PHOSPHATE SERPL-MCNC: 3.1 MG/DL (ref 2.5–4.5)
PLAT MORPH BLD: ABNORMAL
PLATELET # BLD AUTO: 40 10E3/UL (ref 150–450)
POTASSIUM SERPL-SCNC: 3.7 MMOL/L (ref 3.4–5.3)
PROT SERPL-MCNC: 6 G/DL (ref 6.4–8.3)
RBC # BLD AUTO: 2.07 10E6/UL (ref 3.8–5.2)
RBC MORPH BLD: ABNORMAL
SODIUM SERPL-SCNC: 138 MMOL/L (ref 135–145)
UNIT ABO/RH: NORMAL
UNIT NUMBER: NORMAL
UNIT STATUS: NORMAL
UNIT TYPE ISBT: 6200
URATE SERPL-MCNC: 2.5 MG/DL (ref 2.4–5.7)
WBC # BLD AUTO: 0.6 10E3/UL (ref 4–11)

## 2025-03-21 PROCEDURE — 84100 ASSAY OF PHOSPHORUS: CPT | Performed by: PHYSICIAN ASSISTANT

## 2025-03-21 PROCEDURE — 250N000013 HC RX MED GY IP 250 OP 250 PS 637: Performed by: HOSPITALIST

## 2025-03-21 PROCEDURE — 120N000002 HC R&B MED SURG/OB UMMC

## 2025-03-21 PROCEDURE — 83615 LACTATE (LD) (LDH) ENZYME: CPT

## 2025-03-21 PROCEDURE — 250N000011 HC RX IP 250 OP 636

## 2025-03-21 PROCEDURE — 85610 PROTHROMBIN TIME: CPT | Performed by: PHYSICIAN ASSISTANT

## 2025-03-21 PROCEDURE — 84550 ASSAY OF BLOOD/URIC ACID: CPT | Performed by: PHYSICIAN ASSISTANT

## 2025-03-21 PROCEDURE — 85025 COMPLETE CBC W/AUTO DIFF WBC: CPT

## 2025-03-21 PROCEDURE — 80053 COMPREHEN METABOLIC PANEL: CPT

## 2025-03-21 PROCEDURE — 250N000013 HC RX MED GY IP 250 OP 250 PS 637

## 2025-03-21 PROCEDURE — 85730 THROMBOPLASTIN TIME PARTIAL: CPT | Performed by: PHYSICIAN ASSISTANT

## 2025-03-21 PROCEDURE — 250N000011 HC RX IP 250 OP 636: Performed by: STUDENT IN AN ORGANIZED HEALTH CARE EDUCATION/TRAINING PROGRAM

## 2025-03-21 PROCEDURE — 99233 SBSQ HOSP IP/OBS HIGH 50: CPT | Mod: FS

## 2025-03-21 PROCEDURE — P9016 RBC LEUKOCYTES REDUCED: HCPCS

## 2025-03-21 PROCEDURE — 85384 FIBRINOGEN ACTIVITY: CPT | Performed by: PHYSICIAN ASSISTANT

## 2025-03-21 PROCEDURE — 83735 ASSAY OF MAGNESIUM: CPT

## 2025-03-21 RX ORDER — CEFEPIME HYDROCHLORIDE 2 G/1
2 INJECTION, POWDER, FOR SOLUTION INTRAVENOUS EVERY 8 HOURS
Status: COMPLETED | OUTPATIENT
Start: 2025-03-21 | End: 2025-03-25

## 2025-03-21 RX ADMIN — DIPHENHYDRAMINE HYDROCHLORIDE AND LIDOCAINE HYDROCHLORIDE AND ALUMINUM HYDROXIDE AND MAGNESIUM HYDRO 10 ML: KIT at 15:42

## 2025-03-21 RX ADMIN — CEFEPIME 2 G: 2 INJECTION, POWDER, FOR SOLUTION INTRAVENOUS at 12:10

## 2025-03-21 RX ADMIN — CEFEPIME 2 G: 2 INJECTION, POWDER, FOR SOLUTION INTRAVENOUS at 20:21

## 2025-03-21 RX ADMIN — FLUTICASONE PROPIONATE 2 SPRAY: 50 SPRAY, METERED NASAL at 07:42

## 2025-03-21 RX ADMIN — POSACONAZOLE 300 MG: 100 TABLET, DELAYED RELEASE ORAL at 07:42

## 2025-03-21 RX ADMIN — SUCRALFATE ORAL 1 G: 1 SUSPENSION ORAL at 21:42

## 2025-03-21 RX ADMIN — HEPARIN, PORCINE (PF) 10 UNIT/ML INTRAVENOUS SYRINGE 5 ML: at 03:19

## 2025-03-21 RX ADMIN — HEPARIN, PORCINE (PF) 10 UNIT/ML INTRAVENOUS SYRINGE 5 ML: at 21:41

## 2025-03-21 RX ADMIN — SUCRALFATE ORAL 1 G: 1 SUSPENSION ORAL at 11:23

## 2025-03-21 RX ADMIN — LORATADINE 10 MG: 10 TABLET ORAL at 07:44

## 2025-03-21 RX ADMIN — HYDROXYZINE HYDROCHLORIDE 25 MG: 25 TABLET, FILM COATED ORAL at 17:26

## 2025-03-21 RX ADMIN — ACYCLOVIR 400 MG: 400 TABLET ORAL at 07:49

## 2025-03-21 RX ADMIN — ACETAMINOPHEN 650 MG: 325 TABLET, FILM COATED ORAL at 03:04

## 2025-03-21 RX ADMIN — Medication 5 ML: at 07:52

## 2025-03-21 RX ADMIN — Medication 1000 UNITS: at 07:42

## 2025-03-21 RX ADMIN — FAMOTIDINE 20 MG: 20 TABLET, FILM COATED ORAL at 07:42

## 2025-03-21 RX ADMIN — SUCRALFATE ORAL 1 G: 1 SUSPENSION ORAL at 07:42

## 2025-03-21 RX ADMIN — PANTOPRAZOLE SODIUM 40 MG: 40 TABLET, DELAYED RELEASE ORAL at 07:42

## 2025-03-21 RX ADMIN — ACYCLOVIR 400 MG: 400 TABLET ORAL at 20:20

## 2025-03-21 RX ADMIN — SUCRALFATE ORAL 1 G: 1 SUSPENSION ORAL at 16:29

## 2025-03-21 RX ADMIN — DIPHENHYDRAMINE HYDROCHLORIDE AND LIDOCAINE HYDROCHLORIDE AND ALUMINUM HYDROXIDE AND MAGNESIUM HYDRO 10 ML: KIT at 03:05

## 2025-03-21 RX ADMIN — ACETAMINOPHEN 650 MG: 325 TABLET, FILM COATED ORAL at 12:13

## 2025-03-21 RX ADMIN — CEFEPIME 2 G: 2 INJECTION, POWDER, FOR SOLUTION INTRAVENOUS at 04:01

## 2025-03-21 RX ADMIN — FAMOTIDINE 20 MG: 20 TABLET, FILM COATED ORAL at 20:20

## 2025-03-21 RX ADMIN — HEPARIN, PORCINE (PF) 10 UNIT/ML INTRAVENOUS SYRINGE 5 ML: at 08:36

## 2025-03-21 RX ADMIN — PANTOPRAZOLE SODIUM 40 MG: 40 TABLET, DELAYED RELEASE ORAL at 16:29

## 2025-03-21 RX ADMIN — FLUTICASONE FUROATE AND VILANTEROL TRIFENATATE 1 PUFF: 100; 25 POWDER RESPIRATORY (INHALATION) at 07:42

## 2025-03-21 RX ADMIN — PAROXETINE HYDROCHLORIDE 20 MG: 20 TABLET, FILM COATED ORAL at 07:42

## 2025-03-21 RX ADMIN — DIPHENHYDRAMINE HYDROCHLORIDE AND LIDOCAINE HYDROCHLORIDE AND ALUMINUM HYDROXIDE AND MAGNESIUM HYDRO 10 ML: KIT at 09:14

## 2025-03-21 ASSESSMENT — ACTIVITIES OF DAILY LIVING (ADL)
ADLS_ACUITY_SCORE: 33

## 2025-03-21 NOTE — PROGRESS NOTES
CLINICAL NUTRITION SERVICES - REASSESSMENT NOTE     Nutrition Prescription    RECOMMENDATIONS FOR MDs/PROVIDERS TO ORDER:  Appreciate ongoing encouragement of PO intakes with a focus on small, frequent intakes of kcal and protein-dense items     Malnutrition Status:    Patient does not meet two of the established criteria necessary for diagnosing malnutrition    Registered Dietitian Interventions:  ONS PRN     Future/Additional Recommendations:  Intakes, weight trends, use of ONS, need to schedule v PRN pending intakes    Monitor additional nutrition-related findings and follow pt per protocol     SUBJECTIVE INFORMATION  Patient not available for interview due to out of room    CURRENT NUTRITION ORDERS  Diet: Regular  Supplements: PRN - ordered Ensure (x2) yesterday   Current PO Intake: 100%;   RS order review shows 7-day avg of 1213 kcal/day and 54 grams protein/day, 5-day avg of 1371 kcal/day and 61 grams protein/day as ordered.     NEW FINDINGS   Weights:  Most Recent Weight: 77.5 kg (170 lb 14.4 oz)  on 3/20/25 via Standing scale  Body mass index is 29.33 kg/m .  Wt stable over the past week, -2.5 kg (3.1%) since admission (just under 1 month); also noting  -8.4L fluid per I/O since 3/7     Skin/wounds: No pressure injuries documented at this time ,     GI:  0-4 unmeasured BMs per day over past week, per I/O. Only 1 day of x4 occurrences, otherwise x0-2   Last BM: 03/20/25    Renal: GFR >90    Respiratory: Room air     Medications:  Nutrition-relevant medications: Reviewed    Labs:  Nutrition-relevant labs: Reviewed   Electrolytes  Potassium (mmol/L)   Date Value   03/21/2025 3.7   03/20/2025 3.9   03/19/2025 3.8   05/28/2022 3.7   03/30/2022 4.6   03/24/2020 3.6   12/03/2018 3.7   05/11/2018 3.7     Phosphorus (mg/dL)   Date Value   03/21/2025 3.1   03/20/2025 3.3   03/19/2025 3.2   03/18/2025 3.1   03/17/2025 3.5    Blood Glucose  Glucose (mg/dL)   Date Value   03/21/2025 113 (H)   03/20/2025 112 (H)    03/19/2025 136 (H)   03/18/2025 116 (H)   03/17/2025 108 (H)   05/28/2022 115 (H)   03/30/2022 123 (H)   03/24/2020 104   12/03/2018 96   05/11/2018 92   09/17/2017 101   08/16/2017 105     GLUCOSE BY METER POCT (mg/dL)   Date Value   02/13/2025 98   12/19/2024 101 (H)   08/10/2023 106 (H)   05/05/2022 109 (H)     Hemoglobin A1C (%)   Date Value   12/09/2024 6.0 (H)   08/04/2023 5.7   03/30/2022 6.0   03/24/2020 5.8   12/03/2018 5.8   05/11/2018 5.8    Inflammatory Markers  CRP Inflammation (mg/L)   Date Value   02/06/2025 6.20 (H)     WBC (10e9/L)   Date Value   03/24/2020 8.2   12/03/2018 8.1     WBC Count (10e3/uL)   Date Value   03/21/2025 0.6 (LL)   03/20/2025 0.8 (LL)   03/19/2025 0.7 (LL)     Albumin (g/dL)   Date Value   03/21/2025 3.3 (L)   03/20/2025 3.3 (L)   03/19/2025 3.3 (L)   03/30/2022 4.3   09/17/2017 3.8   05/01/2017 4.1   04/04/2016 4.1      Magnesium (mg/dL)   Date Value   03/21/2025 1.9   03/20/2025 2.0   03/19/2025 1.8     Sodium (mmol/L)   Date Value   03/21/2025 138   03/20/2025 139   03/19/2025 133 (L)   03/24/2020 138   12/03/2018 135   05/11/2018 137    Renal  Urea Nitrogen (mg/dL)   Date Value   03/21/2025 9.2   03/20/2025 9.6   03/19/2025 10.7   05/28/2022 9   03/30/2022 24   03/24/2020 17   12/03/2018 8   05/11/2018 12     Creatinine (mg/dL)   Date Value   03/21/2025 0.56   03/20/2025 0.63   03/19/2025 0.66   03/24/2020 0.88   12/03/2018 0.78   05/11/2018 0.72     Additional  Triglycerides (mg/dL)   Date Value   01/14/2025 130   12/09/2024 156 (H)   03/30/2022 481 (H)   03/24/2020 149   12/03/2018 178 (H)   05/01/2017 163 (H)     Ketones Urine (mg/dL)   Date Value   03/18/2025 Negative   05/11/2018 Negative     Platelet Count   Date Value   03/21/2025 40 10e3/uL (LL)   03/24/2020 333 10e9/L     aPTT (Seconds)   Date Value   03/21/2025 28        MALNUTRITION  % Intake: Decreased intake does not meet criteria  % Weight Loss: Weight loss does not meet criteria for malnutrition  -- cannot  rule out confounded by fluid status  Subcutaneous Fat Loss: None observed - per RD note 3/13  Muscle Loss: None observed - per RD note 3/13  Fluid Accumulation/Edema: None noted  Malnutrition Diagnosis: Patient does not meet two of the established criteria necessary for diagnosing malnutrition  Malnutrition Present on Admission: No    EVALUATION OF THE PROGRESS TOWARD GOALS   Previous Goals   Patient to consume % of nutritionally adequate meal trays TID, or the equivalent with supplements/snacks.  2.  Weight maintenance >80 kg  Evaluation: 1. Met 2. Unmet - however potentially confounded by fluid status     Previous Nutrition Diagnosis  Predicted inadequate nutrient intake related to anticipated prolonged hospitalization as evidenced by hypermetabolic disease state, expected LOS of >20 days, good appetite/intakes currently   Evaluation: No change    CURRENT NUTRITION DIAGNOSIS  Predicted inadequate nutrient intake related to anticipated prolonged hospitalization as evidenced by hypermetabolic disease state, expected LOS of >20 days, good appetite/intakes currently     INTERVENTIONS  Medical food supplement therapy    Goals  Patient to consume % of nutritionally adequate meal trays TID, or the equivalent with supplements/snacks.  2.  Weight maintenance >77 kg    Monitoring/Evaluation  Progress toward goals will be monitored and evaluated per policy.    Maryann Martel, MPH, RDN, LD  6A + 5A  ([facility] On[facility] & Heme Malignancy) RD or Radha [6A or 5A Clinical Dietitian]   Weekend/Holiday: Vocera - Weekend Clinical Dietitian

## 2025-03-21 NOTE — PROGRESS NOTES
[facility]    Progress Note  Hematology / Oncology     Date of Admission:  2/26/2025  Hospital Day #: 23   Date of Service (when I saw the patient): 03/21/2025    Assessment & Plan   Alejandra Villegas is a 59 year old female with a past medical history significant for COPD, migraines, rheumatoid arthritis, aortic stenosis, Afib, and anxiety who presented to an outside hospital with cytopenias and was found on BMBx to have hypercellular marrow with 4% blasts, 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 She was subsequently admitted to [facility] on 2/26/25 for further work-up and management and pathology re-review was most consistent with AML with NPM1 mutation by WHO 2022 (which does not require a specific blast threshold). Following further multidisciplinary discussion, she started intensive induction with 7+3 (D1=3/5/25). Her course has been complicated by neutropenic fevers, influenza A, AOM, and L TM perforation.     TODAY:  - D17 7+3 induction.   - Midcycle BMBx (3/19) demonstrates no morphologic or immunophenotypic evidence of AML.   - Epigastric pain continues to improve with BID PPI, GI cocktail, and antireflux regimen recommended by GI. See below for further recs if pain worsens.  - Concern for malnutrition given thin liquid diet in the setting of reflux; now utilizing Ensure drinks. RD following; appreciate.  - Afebrile >48 hours. Infectious workup from neutropenic fever x3/18 without infectious source. Will plan to complete 7 day course, Cefepime 2 g q8h (3/18-3/25), end date ordered.   - Hgb 6.8 on AM labs. Received 1 unit RBCs.    HEME  # AML with NPM1 mutation  Had been seen by PCP Dec 2024 w/ progressive fatigue and nausea where she was found leukopenic WBC  2.4 and neutropenic w/ ANC 0.3. Hgb 12. Was referred to local hematology/oncology clinic for further evaluation and seen by Dr. Samina Harris. BMBx 2/10/25 done at OSH showed hypercellular  marrow w/ trilineage hematopoiesis w/ dyspoiesis with mildly elevated blasts of 4% on morphology. NGS w/ NPM1, IDH2, and NRAS mutations. She was admitted to [facility] 2/26/25 for further workup and management. Slides were re-reviewed by [facility], who noted hypercellular marrow with 8% blasts by morphology. Despite relatively low blast percentage, findings were felt most consistent with a diagnosis AML with NPM1 mutation by WHO 2022 (which does not require a specific blast threshold). Following interdepartmental discussions and review of the available literature, patient was started on intensive induction with 7+3 (Day 1=3/5/25).   - PICC placed 3/5/2025, plan to discontinue on discharge.   - Baseline cardiopulmonary studies:  - Echo w/ EF 60-65%, mild known aortic stenosis.   - EKG (2/27) with NSR, QTc 412  - CXR (2/26) with mildly increased streaky bibasilar opacities, atelectasis or edema. No consolidation.   - Baseline viral serologies: CMV IgG+, EBV IgG+, HepB sAg-, HepB cAb-, HepB sAb-, HSV1+/2+, HIV-  - HLA Typing sent on admission. Message sent to notify BMT team x2/27 for IP consult.   - Midcycle BMBx 3/19/25: Flow with no increase in myeloid blasts and no abnormal myeloid blast population. Morphology with no morphologic or immunophenotypic evidence of acute myeloid leukemia.                                         Treatment plan: 7+3 (C1D1=3/5/2025)                            - Daunorubicin 60 mg/m  IV D1-3                            - Cytarabine 100 mg/m  IV D1-7                              - Pre-medications: zofran, dexamethasone 12 mg D1-3     # Pancytopenia  Secondary to underlying hematologic malignancy and exacerbated by recent chemotherapy.  - Follow daily CBC with differential  - Transfuse to keep Hgb >7, plt >10K  - Blood consent obtained 2/26/25 on admission and placed in patient's paper chart.      # Risk for TLS  # Hyperphosphatemia, resolved  Secondary to hematologic malignancy, no  evidence of TLS on admission.   - Allopurinol 300 mg daily x7 days (through 3/11)  - Follow TLS labs daily     # Risk for DIC  Secondary to underlying hematologic malignancy.  - Follow coags every MWF  - Transfuse cryo to keep fibrinogen >100, FFP to keep INR <1.8     ID  # ID prophylaxis  - Acyclovir 400 mg BID  - Levaquin 500 mg daily - on hold with cefepime as above  - Posaconazole 300 mg daily   - Vori w/ $1.60 copay, posa requiring PA also w/ $1.60 copay. Rotated from deny to vori (3/13-3/17) w/ completion of chemotherapy, then transitioned to posa x3/18 given visual hallucinations x3/17     # Neutropenic fever  # Influenza A, resolved  3/19: Tmax 100.6 overnight. Asymptomatic, although notes sweating upon waking the following morning. Started IV Cefepime 2 g q8h (3/18-X). CXR with scattered patchy densities, especially in the lung bases. Respiratory panel negative. UA with protein and blood, no sign of infection. UCx and BCx NGTD. Of note, taking APAP daily for headaches which may be suppressing fevers.   - Will plan to complete 7 day course, Cefepime 2 g q8h (3/18-3/25), end date ordered.   - Continue to monitor for fever recurrence.  3/13: Febrile 100.4 overnight x3/13. No localizing/new symptoms. OVSS. Patient was wearing heated jacket that she attributed to temperature, no documentation noted of notifying cross cover MD. No further ID workup (BCx, UA, UC, or CXR) or broadening of abx completed. Given prolonged period on AM chart check x3/14 since documented fever and patient has remained afebrile and OVSS, will defer further workup/initiation of abx.   2/25: On admission, patient noted subjective fever with chills and rhinitis beginning 2/25 PM prior to admission. No other localizing s/sx of infection. She was afebrile on admission but spiked a low-grade fever to 100.7 on 2/26 PM. Blood and urine cultures negative, CXR with streaky opacities but no burt consolidation. Work-up positive for influenza A, and  she completed a course of Tamiflu 75 mg BID x5 days (2/26-3/3). She also received a short empiric course of IV cefepime (2/26-3/1) given concurrent neutropenia.     # R AOM with purulent effusion, resolved  # Small L TM perforation, resolved  Patient noted B/L ear pain, L>R s/p drainage from R side on 3/3. Noted h/o recurrent AOMs. On exam, TMs appear full with erythema to outer edges, patient noted pain w/ exam though able to tolerate. Small TM perforation noted to L side. Query if perforation from recurrent AOM or 2/2 congestion and fluid from recent influenza.  - ENT consulted, appreciate recs:  - Recommended 7-day course Augmentin (per cross-cover discussion with pharmacy, given h/o allergic reaction to Augmentin, increased dose of levofloxacin from 500 mg ? 750 mg daily (3/5-3/11) then plan to deescalate to ppx levaquin 500 mg daily.   - 5 day course of floxin drops to left ear for perforation (3/3-3/7)  - Follow up w/ PCP for left TM perforation - if persistent in 6-8 weeks recommend outpt ENT referral.     GI/  # GERD  # Epigastric pain  Reported recent increase in symptoms in the days leading up to admission and initiated famotidine.   - 3/9: Patient reported onset of midsternal chest pain that felt like reflux symptoms shortly after eating lunch. EKG NSR. States on 3/8, she experienced episode of emesis shortly after eating without preceding symptoms, no nausea at the time. States this has happened before on occasion and has a history of this happening in the past.  No radiation of pain. She also wondered if this may be related to the nicotine patch which she removed as she was also experiencing tachycardia, though had also been drinking energy drinks which she is trying to cut back on. Pain is not reproducible on exam. Also feels anxiety is attributing to some of her symptoms. Increased famotidine to 20 mg BID x3/9.   - 3/14: Ongoing GERD symptoms w/ epigastric pain, dull pain localized to epigastric  region, worsened w/ N/V and anxiety. Rates 2-3/10, no TTP or reproducible symptoms. Denies melena, dark tarry stools, hematemesis w/ emetic episodes x3/13, or other sx c/f UGIB. Of note, AM Hgb 6.1, will transfuse 1 unit pRBCs and follow for recheck. Add daily PPI to regimen x3/14.  - 3/19: Epigastric pain worsening, no longer relieved with eructation or GERD precautions provided by SLP. EKG NSR, Troponin and Lipase wnl. GI consulted for progressive pain, recommending increased PPI with CT chest/abd to assess underlying etiology. Epigastric pain improved 3/20 with increasing PPI to BID x3/19 and GI cocktail overnight. CT chest/abd with esophagitis and small hiatal hernia. If ongoing non-specific epigastric pain despite regimen below in the absence of c/f opportunistic infection, could consider trial of sumatriptan for potential abdominal migraine given h/o chronic migraines.   - SLP consulted 3/10 - Ok for regular diet w/ thin liquids, signed off 3/13  - Continue to encourage lifestyle modifications: avoid straws, carbonated beverages, GERD precautions  - Famotidine 20 mg BID   - Protonix 40 mg BID   - GI cocktail prn  - TUMS prn  Per GI, if patient fails conservative management can reassess risks/benefits of EGD upon count recovery.    # Risk of malnutrition  Secondary to esophagitis/GERD/mucositis picture as above, patient intake has decreased to small servings, mostly eating soup. On regular adult diet with thin liquids per SLP. Recommended trial of high caloric liquids, patient tried Strawberry Ensure and plans to continue to order with meals.  - RD following; appreciate recs     # Nausea/vomiting, recurrent  Patient noted ongoing nausea w/ occasional vomiting starting Dec 2024. Has been managed with PRN Zofran.  Endorsed nausea on admission, now resolved.   - PRN Zofran and compazine  - Could consider trial of PRN Zyprexa if 3rd agent is desired/required     # Urinary frequency, resolved  # Dysuria,  "resolved  On admission, patient noted longstanding history of urinary frequency though new mild dysuria. Denies hematuria, bladder tenderness of CVA tenderness. UA (2/26) negative, UC with no growth. Symptoms have since resolved.     PULM  # COPD  Longstanding history of COPD. On admission patient reports symptoms are manageable and no recent exacerbations. Most recent PFTs (2018) were normal.  - Continue PTA Breo Ellipta inhaler and PRN DuoNebs      CV  #Non-radiating chest pain, resolved  Patient noted new non-radiating mid-sternal chest pain with associated reflux-like symptoms with anxiety x3/9 after eating lunch. EKG showed NSR. Noted x3/8, experiencing episode of emesis shortly after eating without preceding symptoms, no nausea at the time, and similar mid sternal discomfort that felt like her \"esophagus\".  States this has happened before on occasion and has a history of this happening in the past.  She also wondered if this may be related to the nicotine patch which she removed as she was also experiencing tachycardia, though had also been drinking energy drinks which she is trying to cut back on.  Pain not reproducible on exam.  Also feels anxiety is attributing to some of her symptoms as detailed below.      # Essential hypertension  - Continue PTA lisinopril - HELD 3/11 w/ soft BPs (90s-100s/50s-60s), resume pending daily trends     # Mixed hyperlipidemia  Lipid panel has remained at goal, 1/14/25 panel w/ cholesterol 163, , LDL 92, HDL 45.   - Held PTA atorvastatin on admission due to chemotherapy interactions; will also likely have DDI with azole once started. Consider rotation to rosuvastatin for better drug-drug interaction profile.      # H/o aortic stenosis  Patient noted on admission longstanding history of aortic stenosis. Pre-chemo echo w/ EF 60-65% and mild aortic stenosis and insufficiency. No previous echo to compare.      RENAL/FEN  # Stage 2 CKD  Baseline Cr ~ 0.7. On admission Cr " 0.77.  - Continue to trend on daily labs and encourage PO hydration      # Mild hyponatremia, resolved  New mild hyponatremia noted 3/13 with Na 135 ? 130. Asymptomatic. Trial 1 L NS. If persistent/worsening then could consider initiation of salt tabs, hypertonic saline, and would obtain further workup with urine lytes.      NEURO  # Degenerative disc disease, L5-S1  - Continue PTA gabapentin PRN     # Chronic migraine w/o aura  # Chronic headaches  Present since childhood. Reportedly triggered by neck manipulation/movement. Reportedly well-managed with regimen below. Headaches occurring throughout admission with daily APAP use, also chronic and managed with APAP PRN at home. Patient denies acute worsening of headache or neck pain, vision changes, numbness/tingling of extremities.  - APAP PRN  - Continue PTA sumatriptan and cyclobenzaprine PRN  - Continue to monitor      ENDO  # Prediabetes  Last A1c 6.0 x12/9/24. Has been managing with lifestyle modifications.      MISC  # Rheumatoid arthritis   Patient reported trying treatment though was unable to tolerate well. Managed with Tylenol PRN. Most recent RF >650 (2/10/25). JI negative.       # BROOKS  # MDD  # At risk for adjustment disorder  Patient reports good control of anxiety/depressive symptoms prior to admission. Did note feeling overwhelmed by new diagnosis. On admission discussed inpatient services such as health psychology or cordelia, she respectfully declined though will consider. Ongoing discussion regarding symptoms of anxiety and depression continued throughout admission; patient endorses continuous chemo limiting accessibility to previously frequent trips outside has affected symptoms, feeling hopeful w/ cytarabine to complete tomorrow PM. Discussed increase in dose of Paxil and further d/w health psych; she declined at this time. C/w prn atarax and Paxil.   - Readdress health psych consult as indicated  - Continue PTA paroxetine   - Atarax as needed    "  # Vitamin D deficiency  Continue PTA vit D supplement     # Seasonal allergies   - Claritin 10 mg daily     # Tobacco use   Has smoked since age 16, 1.5 ppd (roughly 65 pack years). Attempting to cut back as recently as 01/2025 to 0.5 ppd. We discussed the risks of smoking during induction chemotherapy including increased risk for infection in setting of severe neutropenia that could further complicate course, even leading to death. She expressed understanding and appreciation of conversation.   - Encourage smoking cessation, readdress NRT as needed.   - Nicotine patch ordered 2/28. Patient had been intermittently declining patch and reported associated increased symptoms of anxiety, and felt like patch precipitated anxiety attack w/ chest pain on 3/9. Discontinued 3/11, offered trial of lower dose patch or alternative NRT that patient declined.    - Discussed option of smoking cessation team, patient declined at this time.     Fluids/Electrolytes/Nutrition   - IVF prn   - PRN lyte replacement per standing protocol  - Regular diet as tolerated      Lines: PICC     PPX  VTE: TCP, Lovenox held 3/13  GI: Pepcid, Protonix  Bowels: prn senna and miralax  Activity: as tolerated    Clinically Significant Risk Factors          # Hyperchloremia: Highest Cl = 108 mmol/L in last 2 days, will monitor as appropriate          # Hypoalbuminemia: Lowest albumin = 3.3 g/dL at 3/21/2025  3:07 AM, will monitor as appropriate   # Thrombocytopenia: Lowest platelets = 10 in last 2 days, will monitor for bleeding   # Hypertension: Noted on problem list            # Overweight: Estimated body mass index is 29.33 kg/m  as calculated from the following:    Height as of this encounter: 1.626 m (5' 4\").    Weight as of this encounter: 77.5 kg (170 lb 14.4 oz).      # Financial/Environmental Concerns: none  # Asthma: noted on problem list         Code Status: DNR/DNI    Disposition: Admitted for further workup and management. Discharge " pending treatment decisions and clinical course.   Follow-up: Will need to request APPT18 closer to discharge date and determine primary oncologist. Messaged Dr. Harris x3/21 for primary oncologist preference, as Alejandra would like to follow with her at the [facility] if possible.    Medically Ready for Discharge:        Staffed with Dr. Moreno.    I spent 45 minutes in the care of this patient today, which included time necessary for review of interval events, obtaining history and physical exam, ordering medication(s)/test(s) as medically indicated, discussion with interdisciplinary/consult team(s), care coordination, and documentation time.     Nery Crowell PA-C  Hematology/Oncology  Pager: #[phone]    Interval History   Nursing notes reviewed. No acute events overnight. Afebrile and vitally stable.    Alejandra reports that her epigastric pain, which she refers to as chest pain, is much improved this morning. She is able to fall asleep easily secondary to the relief that the PPI and GI cocktail have provided. She drank 3 strawberry Ensures last night with soup, popsicles, and bananas. Continues to have eructation but it is less bothersome. Reviewed bone marrow biopsy results with Alejandra, with which she is very pleased and excited to share with her family. Denies headache, vision changes, rash, nausea/vomiting/diarrhea, abdominal pain, peripheral edema. Last bowel movement today. Appetite is intact. Smoking daily. All concerns were addressed and questions were answered.     A comprehensive review of symptoms was performed and was negative except as detailed in the interval history above.    Physical Exam   Vital Signs with Ranges  Temp:  [97.6  F (36.4  C)-98.5  F (36.9  C)] 98.1  F (36.7  C)  Pulse:  [70-82] 71  Resp:  [16-20] 16  BP: (102-122)/(60-74) 102/60  MAP:  [72 mmHg-78 mmHg] 72 mmHg  SpO2:  [94 %-100 %] 94 %    I/O last 3 completed shifts:  In: 844 [P.O.:594; I.V.:250]  Out: 850  [Urine:850]    Vitals:    03/18/25 0823 03/19/25 0836 03/20/25 0755   Weight: 76.8 kg (169 lb 4.8 oz) 77.8 kg (171 lb 8 oz) 77.5 kg (170 lb 14.4 oz)     Constitutional: Pleasant and cooperative female, in NAD. Alert, interactive, non-toxic. Smiling and conversational.  HEENT: NC/AT, sclera clear, conjunctiva normal, OP with MMM, no discrete intraoral lesions or thrush, poor dentition.  Respiratory: No increased work of breathing, no crackles or wheezing. Breath sounds mildly diminished in right lung base.  Cardiovascular: RRR, systolic murmur. No peripheral edema. No calf tenderness or palpable cords.  GI: Normal bowel sounds. Abdomen with central obesity, is soft, non-distended and non-tender to palpation.  Skin: Warm, dry, well-perfused. No bruising, bleeding, rashes, or lesions on limited exam. BMBx site bandaged.   Musculoskeletal: Diminished muscle bulk, no gross deformities.  Neurologic: A&O. Answers questions appropriately, speech normal. Moves all extremities spontaneously.  Psychiatric: Normal mood and affect.  Vascular access:  PICC on RUE CDI, non-tender, no surrounding erythema.    Medications   Current Facility-Administered Medications   Medication Dose Route Frequency Provider Last Rate Last Admin     Current Facility-Administered Medications   Medication Dose Route Frequency Provider Last Rate Last Admin    acyclovir (ZOVIRAX) tablet 400 mg  400 mg Oral BID Chelsie Murphy PA-C   400 mg at 03/20/25 2129    alteplase (CATHFLO ACTIVASE) injection 2 mg  2 mg Intravenous Q2H Aman Cervantes MD        ceFEPIme (MAXIPIME) 2 g vial to attach to  mL bag for ADULTS or NS 50 mL bag for PEDS  2 g Intravenous Q8H Fer Cole  mL/hr at 03/21/25 0401 2 g at 03/21/25 0401    famotidine (PEPCID) tablet 20 mg  20 mg Oral BID Zully Garcia PA-C   20 mg at 03/20/25 2129    fluticasone-vilanterol (BREO ELLIPTA) 100-25 MCG/ACT inhaler 1 puff  1 puff Inhalation Daily  Chelsie Murphy PA-C   1 puff at 03/20/25 0754    heparin lock flush 10 unit/mL injection 5-20 mL  5-20 mL Intracatheter Q24H Chelsie Murphy PA-C   5 mL at 03/20/25 0754    [Held by provider] levofloxacin (LEVAQUIN) tablet 500 mg  500 mg Oral Daily Zully Garcia PA-C   500 mg at 03/18/25 0916    [Held by provider] lisinopril (ZESTRIL) tablet 10 mg  10 mg Oral Daily Jyoti Tenorio PA-C   10 mg at 03/10/25 1039    loratadine (CLARITIN) tablet 10 mg  10 mg Oral Daily Chelsie Murphy PA-C   10 mg at 03/20/25 0755    pantoprazole (PROTONIX) EC tablet 40 mg  40 mg Oral BID  Nery Crowell PA-C   40 mg at 03/20/25 1713    PARoxetine (PAXIL) tablet 20 mg  20 mg Oral [facility] Chelsie Murphy PA-C   20 mg at 03/20/25 0754    posaconazole (NOXAFIL) DR tablet TBEC 300 mg  300 mg Oral Nery Dubose PA-C   300 mg at 03/20/25 0754    sodium chloride (PF) 0.9% PF flush 10-40 mL  10-40 mL Intracatheter Q8H Chelsie Murphy PA-C   10 mL at 03/20/25 0755    sucralfate (CARAFATE) suspension 1 g  1 g Oral 4x Daily AC & HS Aman Cervantes MD   1 g at 03/20/25 2129    Vitamin D3 (CHOLECALCIFEROL) tablet 1,000 Units  1,000 Units Oral Daily Chelsie Murphy PA-C   1,000 Units at 03/20/25 0755     Antiinfectives  Anti-infectives (From now, onward)      Start     Dose/Rate Route Frequency Ordered Stop    03/18/25 2000  ceFEPIme (MAXIPIME) 2 g vial to attach to  mL bag for ADULTS or NS 50 mL bag for PEDS         2 g  over 30 Minutes Intravenous EVERY 8 HOURS 03/18/25 1955 03/18/25 0800  posaconazole (NOXAFIL) DR tablet TBEC 300 mg         300 mg Oral EVERY MORNING 03/17/25 1242      03/11/25 0800  [Held by provider]  levofloxacin (LEVAQUIN) tablet 500 mg        (On hold since Tue 3/18/2025 at 2010 until manually unheld; held by Fer Cole MDHold Reason: OtherHold Comments: Hold while on cefepime)    500 mg Oral DAILY  03/04/25 1102      02/26/25 2000  acyclovir (ZOVIRAX) tablet 400 mg         400 mg Oral 2 TIMES DAILY 02/26/25 1307            Data   CBC   Recent Labs   Lab 03/21/25 0307 03/20/25 0212 03/19/25  0248 03/18/25  0323   WBC 0.6* 0.8* 0.7* 0.6*   RBC 2.07* 2.37* 2.09* 2.32*   HGB 6.8* 7.6* 7.0* 7.7*   HCT 18.8* 21.5* 19.6* 21.7*   MCV 91 91 94 94   MCH 32.9 32.1 33.5* 33.2*   MCHC 36.2 35.3 35.7 35.5   RDW 15.9* 16.3* 16.7* 17.2*   PLT 40* 10* 17* 25*     CMP   Recent Labs   Lab 03/21/25  0307 03/20/25  0212 03/19/25  1108 03/19/25  0248 03/18/25  0323 03/17/25  0335    139  --  133* 135 134*   POTASSIUM 3.7 3.9 3.8 3.0* 3.6 3.7   CHLORIDE 105 108*  --  101 102 102   CO2 22 22  --  22 20* 23   ANIONGAP 11 9  --  10 13 9   * 112*  --  136* 116* 108*   BUN 9.2 9.6  --  10.7 11.4 11.1   CR 0.56 0.63  --  0.66 0.63 0.62   GFRESTIMATED >90 >90  --  >90 >90 >90   TOBIN  --  8.8  --  8.5* 8.8 8.8   MAG 1.9 2.0  --  1.8 1.8 2.0   PHOS 3.1 3.3  --  3.2 3.1 3.5   PROTTOTAL 6.0* 6.2*  --  6.1* 6.3* 6.3*   ALBUMIN 3.3* 3.3*  --  3.3* 3.5 3.5   BILITOTAL 0.3 0.4  --  0.4 0.7 0.6   ALKPHOS 101 102  --  106 112 113   AST 10 9  --  8 10 10   ALT 9 7  --  7 9 8     LFTs   Recent Labs   Lab 03/21/25  0307   PROTTOTAL 6.0*   ALBUMIN 3.3*   BILITOTAL 0.3   ALKPHOS 101   AST 10   ALT 9     Coagulation Studies   Recent Labs   Lab 03/21/25  0307   INR 1.08   PTT 28

## 2025-03-21 NOTE — PLAN OF CARE
Goal Outcome Evaluation:      Plan of Care Reviewed With: patient    Overall Patient Progress: no changeOverall Patient Progress: no change    Outcome Evaluation: C1D17 7+3    Epigastric/chest pain managed with tylenol & GI cocktail.  Dr. Johns went thru preliminary bmbx result which show no evidence of AML & no circulating blast.   Pt drank ensure plus x 2 for breakfast.   UAL, frequently walks in hallway & outside to smoke.          Significant 24 hour events:   WBC- 0.6, Hgb- 6.8, Plt- 40, ANC- 0.0Started IV cefepime 3/19    Level of Care: Acute    Summary Type: 5A Report   Pain:  Controlled  Neuro:WDL  CV:WDL  Resp:WDL  GI:WDL, worse epigastric pain  :WDL  Skin: .WDL except, integrity  Activity Assistance: independent  Safety/Falls Risk: Level of Risk: Moderate Risk  Consults: ENT, SLP  Procedures/Imaging:   3/19 BMBx:   Precautions: Neutropenia

## 2025-03-21 NOTE — PLAN OF CARE
[phone]    Goal Outcome Evaluation:      Plan of Care Reviewed With: patient    Overall Patient Progress: no changeOverall Patient Progress: no change     A&Ox4. Afebrile. If spikes temp needs Bld Cx. VSS on RA. UAL. Denies SOB, dizziness and N/V. PRN meds given. Double lumen PICC hep locked. Uses call light appropriately. Voiding spontaneously w/ AUOP. No BM this shift.Heartburn intermittently. Has PRN q6hrs GI cocktail. BMBx done on 3/19- dressings intact. Done w/ chemo.  Able to make needs known. No acute events during shift.        MOST RECENT VITALS: T:    , BP:    , P:    , O2:     RR:         Continue to monitor and follow POC           Melissa Pereira, RN......3/20/2025 10:19 PM

## 2025-03-22 LAB
ABO + RH BLD: NORMAL
ALBUMIN SERPL BCG-MCNC: 3.3 G/DL (ref 3.5–5.2)
ALP SERPL-CCNC: 105 U/L (ref 40–150)
ALT SERPL W P-5'-P-CCNC: 9 U/L (ref 0–50)
ANION GAP SERPL CALCULATED.3IONS-SCNC: 10 MMOL/L (ref 7–15)
AST SERPL W P-5'-P-CCNC: 10 U/L (ref 0–45)
BASOPHILS # BLD AUTO: 0 10E3/UL (ref 0–0.2)
BASOPHILS NFR BLD AUTO: 0 %
BILIRUB SERPL-MCNC: 0.4 MG/DL
BLD GP AB SCN SERPL QL: NEGATIVE
BUN SERPL-MCNC: 12.7 MG/DL (ref 6–20)
CALCIUM SERPL-MCNC: 8.8 MG/DL (ref 8.8–10.4)
CHLORIDE SERPL-SCNC: 107 MMOL/L (ref 98–107)
CREAT SERPL-MCNC: 0.57 MG/DL (ref 0.51–0.95)
EGFRCR SERPLBLD CKD-EPI 2021: >90 ML/MIN/1.73M2
EOSINOPHIL # BLD AUTO: 0 10E3/UL (ref 0–0.7)
EOSINOPHIL NFR BLD AUTO: 0 %
ERYTHROCYTE [DISTWIDTH] IN BLOOD BY AUTOMATED COUNT: 15.4 % (ref 10–15)
GLUCOSE SERPL-MCNC: 110 MG/DL (ref 70–99)
HCO3 SERPL-SCNC: 24 MMOL/L (ref 22–29)
HCT VFR BLD AUTO: 22.1 % (ref 35–47)
HGB BLD-MCNC: 7.9 G/DL (ref 11.7–15.7)
IMM GRANULOCYTES # BLD: 0 10E3/UL
IMM GRANULOCYTES NFR BLD: 0 %
LDH SERPL L TO P-CCNC: 154 U/L (ref 0–250)
LYMPHOCYTES # BLD AUTO: 0.6 10E3/UL (ref 0.8–5.3)
LYMPHOCYTES NFR BLD AUTO: 94 %
MAGNESIUM SERPL-MCNC: 2 MG/DL (ref 1.7–2.3)
MCH RBC QN AUTO: 32.1 PG (ref 26.5–33)
MCHC RBC AUTO-ENTMCNC: 35.7 G/DL (ref 31.5–36.5)
MCV RBC AUTO: 90 FL (ref 78–100)
MONOCYTES # BLD AUTO: 0 10E3/UL (ref 0–1.3)
MONOCYTES NFR BLD AUTO: 2 %
NEUTROPHILS # BLD AUTO: 0 10E3/UL (ref 1.6–8.3)
NEUTROPHILS NFR BLD AUTO: 5 %
NRBC # BLD AUTO: 0 10E3/UL
NRBC BLD AUTO-RTO: 0 /100
PHOSPHATE SERPL-MCNC: 3.5 MG/DL (ref 2.5–4.5)
PLAT MORPH BLD: NORMAL
PLATELET # BLD AUTO: 28 10E3/UL (ref 150–450)
POTASSIUM SERPL-SCNC: 3.6 MMOL/L (ref 3.4–5.3)
PROT SERPL-MCNC: 6 G/DL (ref 6.4–8.3)
RBC # BLD AUTO: 2.46 10E6/UL (ref 3.8–5.2)
RBC MORPH BLD: NORMAL
SODIUM SERPL-SCNC: 141 MMOL/L (ref 135–145)
SPECIMEN EXP DATE BLD: NORMAL
URATE SERPL-MCNC: 2.4 MG/DL (ref 2.4–5.7)
WBC # BLD AUTO: 0.7 10E3/UL (ref 4–11)

## 2025-03-22 PROCEDURE — 250N000013 HC RX MED GY IP 250 OP 250 PS 637

## 2025-03-22 PROCEDURE — 250N000011 HC RX IP 250 OP 636

## 2025-03-22 PROCEDURE — 86900 BLOOD TYPING SEROLOGIC ABO: CPT

## 2025-03-22 PROCEDURE — 83735 ASSAY OF MAGNESIUM: CPT

## 2025-03-22 PROCEDURE — 84100 ASSAY OF PHOSPHORUS: CPT | Performed by: PHYSICIAN ASSISTANT

## 2025-03-22 PROCEDURE — 120N000002 HC R&B MED SURG/OB UMMC

## 2025-03-22 PROCEDURE — 99233 SBSQ HOSP IP/OBS HIGH 50: CPT | Mod: FS | Performed by: PHYSICIAN ASSISTANT

## 2025-03-22 PROCEDURE — 80053 COMPREHEN METABOLIC PANEL: CPT

## 2025-03-22 PROCEDURE — 86923 COMPATIBILITY TEST ELECTRIC: CPT

## 2025-03-22 PROCEDURE — 84550 ASSAY OF BLOOD/URIC ACID: CPT | Performed by: PHYSICIAN ASSISTANT

## 2025-03-22 PROCEDURE — 83615 LACTATE (LD) (LDH) ENZYME: CPT

## 2025-03-22 PROCEDURE — 250N000013 HC RX MED GY IP 250 OP 250 PS 637: Performed by: HOSPITALIST

## 2025-03-22 PROCEDURE — 85025 COMPLETE CBC W/AUTO DIFF WBC: CPT

## 2025-03-22 RX ADMIN — FAMOTIDINE 20 MG: 20 TABLET, FILM COATED ORAL at 09:32

## 2025-03-22 RX ADMIN — CEFEPIME 2 G: 2 INJECTION, POWDER, FOR SOLUTION INTRAVENOUS at 20:50

## 2025-03-22 RX ADMIN — LORATADINE 10 MG: 10 TABLET ORAL at 09:32

## 2025-03-22 RX ADMIN — ACETAMINOPHEN 650 MG: 325 TABLET, FILM COATED ORAL at 02:29

## 2025-03-22 RX ADMIN — HYDROXYZINE HYDROCHLORIDE 25 MG: 25 TABLET, FILM COATED ORAL at 20:49

## 2025-03-22 RX ADMIN — DIPHENHYDRAMINE HYDROCHLORIDE AND LIDOCAINE HYDROCHLORIDE AND ALUMINUM HYDROXIDE AND MAGNESIUM HYDRO 10 ML: KIT at 16:51

## 2025-03-22 RX ADMIN — ACYCLOVIR 400 MG: 400 TABLET ORAL at 20:49

## 2025-03-22 RX ADMIN — PANTOPRAZOLE SODIUM 40 MG: 40 TABLET, DELAYED RELEASE ORAL at 16:51

## 2025-03-22 RX ADMIN — HEPARIN, PORCINE (PF) 10 UNIT/ML INTRAVENOUS SYRINGE 5 ML: at 13:05

## 2025-03-22 RX ADMIN — PAROXETINE HYDROCHLORIDE 20 MG: 20 TABLET, FILM COATED ORAL at 09:31

## 2025-03-22 RX ADMIN — SUCRALFATE ORAL 1 G: 1 SUSPENSION ORAL at 12:02

## 2025-03-22 RX ADMIN — CEFEPIME 2 G: 2 INJECTION, POWDER, FOR SOLUTION INTRAVENOUS at 04:33

## 2025-03-22 RX ADMIN — Medication 1000 UNITS: at 09:32

## 2025-03-22 RX ADMIN — SUCRALFATE ORAL 1 G: 1 SUSPENSION ORAL at 09:31

## 2025-03-22 RX ADMIN — ACYCLOVIR 400 MG: 400 TABLET ORAL at 09:32

## 2025-03-22 RX ADMIN — CEFEPIME 2 G: 2 INJECTION, POWDER, FOR SOLUTION INTRAVENOUS at 12:01

## 2025-03-22 RX ADMIN — DIPHENHYDRAMINE HYDROCHLORIDE AND LIDOCAINE HYDROCHLORIDE AND ALUMINUM HYDROXIDE AND MAGNESIUM HYDRO 10 ML: KIT at 09:30

## 2025-03-22 RX ADMIN — SUCRALFATE ORAL 1 G: 1 SUSPENSION ORAL at 21:41

## 2025-03-22 RX ADMIN — ACETAMINOPHEN 650 MG: 325 TABLET, FILM COATED ORAL at 20:49

## 2025-03-22 RX ADMIN — PANTOPRAZOLE SODIUM 40 MG: 40 TABLET, DELAYED RELEASE ORAL at 09:32

## 2025-03-22 RX ADMIN — HEPARIN, PORCINE (PF) 10 UNIT/ML INTRAVENOUS SYRINGE 5 ML: at 21:41

## 2025-03-22 RX ADMIN — FAMOTIDINE 20 MG: 20 TABLET, FILM COATED ORAL at 20:49

## 2025-03-22 RX ADMIN — Medication 5 ML: at 06:01

## 2025-03-22 RX ADMIN — POSACONAZOLE 300 MG: 100 TABLET, DELAYED RELEASE ORAL at 09:32

## 2025-03-22 RX ADMIN — ACETAMINOPHEN 650 MG: 325 TABLET, FILM COATED ORAL at 08:34

## 2025-03-22 RX ADMIN — SUCRALFATE ORAL 1 G: 1 SUSPENSION ORAL at 16:51

## 2025-03-22 RX ADMIN — HYDROXYZINE HYDROCHLORIDE 25 MG: 25 TABLET, FILM COATED ORAL at 12:02

## 2025-03-22 RX ADMIN — FLUTICASONE FUROATE AND VILANTEROL TRIFENATATE 1 PUFF: 100; 25 POWDER RESPIRATORY (INHALATION) at 09:31

## 2025-03-22 ASSESSMENT — ACTIVITIES OF DAILY LIVING (ADL)
ADLS_ACUITY_SCORE: 33

## 2025-03-22 NOTE — PLAN OF CARE
Goal Outcome Evaluation:      Plan of Care Reviewed With: patient    Overall Patient Progress: no change    Outcome Evaluation: C1D17 7+3    A/Ox4. VSS on RA. Epigastric pain managed with GI cocktail x1 and scheduled sucralfate. Denies nausea, SOB, and dizziness. UAL, frequent walks. Regular diet with fair appetite. LBM 3/21 and is passing gas. Voiding spontaneously but not saving. PICC is heparin locked on both lumens. Chemo complete and awaiting count recovery. Continue with POC.

## 2025-03-22 NOTE — PROGRESS NOTES
[facility]    Progress Note  Hematology / Oncology     Date of Admission:  2/26/2025  Hospital Day #: 24   Date of Service (when I saw the patient): 03/22/2025    Assessment & Plan   Alejandra Villegas is a 59 year old female with a past medical history significant for COPD, migraines, rheumatoid arthritis, aortic stenosis, Afib, and anxiety who presented to an outside hospital with cytopenias and was found on BMBx to have hypercellular marrow with 4% blasts, 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 She was subsequently admitted to [facility] on 2/26/25 for further work-up and management and pathology re-review was most consistent with AML with NPM1 mutation by WHO 2022 (which does not require a specific blast threshold). Following further multidisciplinary discussion, she started intensive induction with 7+3 (D1=3/5/25). Her course has been complicated by neutropenic fevers, influenza A, AOM, and L TM perforation.     TODAY:  - D18 7+3 induction.   - Continued improvement in epigastric pain with maximal acid suppression with BID PPI, GI cocktail, and antireflux regimen     HEME  # AML with NPM1 mutation  Had been seen by PCP Dec 2024 w/ progressive fatigue and nausea where she was found leukopenic WBC  2.4 and neutropenic w/ ANC 0.3. Hgb 12. Was referred to local hematology/oncology clinic for further evaluation and seen by Dr. Samina Harris. BMBx 2/10/25 done at OSH showed hypercellular marrow w/ trilineage hematopoiesis w/ dyspoiesis with mildly elevated blasts of 4% on morphology. NGS w/ NPM1, IDH2, and NRAS mutations. She was admitted to [facility] 2/26/25 for further workup and management. Slides were re-reviewed by [facility] Hematopathology, who noted hypercellular marrow with 8% blasts by morphology. Despite relatively low blast percentage, findings were felt most consistent with a diagnosis AML with NPM1 mutation by WHO 2022 (which does not require a  specific blast threshold). Following interdepartmental discussions and review of the available literature, patient was started on intensive induction with 7+3 (Day 1=3/5/25).   - PICC placed 3/5/2025, plan to discontinue on discharge.   - Baseline cardiopulmonary studies:  - Echo w/ EF 60-65%, mild known aortic stenosis.   - EKG (2/27) with NSR, QTc 412  - CXR (2/26) with mildly increased streaky bibasilar opacities, atelectasis or edema. No consolidation.   - Baseline viral serologies: CMV IgG+, EBV IgG+, HepB sAg-, HepB cAb-, HepB sAb-, HSV1+/2+, HIV-  - HLA Typing sent on admission. Message sent to notify BMT team x2/27 for IP consult.   - Midcycle BMBx 3/19/25: Flow with no increase in myeloid blasts and no abnormal myeloid blast population. Morphology with no morphologic or immunophenotypic evidence of acute myeloid leukemia.                 Treatment plan: 7+3 (C1D1=3/5/2025)  - Daunorubicin 60 mg/m  IV D1-3  - Cytarabine 100 mg/m  IV D1-7    Supportive medications: zofran, dexamethasone 12 mg D1-3     # Pancytopenia  Secondary to underlying hematologic malignancy and exacerbated by recent chemotherapy.  - Follow daily CBC with differential  - Transfuse to keep Hgb >7, plt >10K  - Blood consent obtained 2/26/25 on admission and placed in patient's paper chart.      # Risk for TLS  # Hyperphosphatemia, resolved  Secondary to hematologic malignancy, no evidence of TLS on admission.   - Allopurinol 300 mg daily x7 days (through 3/11)  - Follow TLS labs daily     # Risk for DIC  Secondary to underlying hematologic malignancy.  - Follow coags every MWF     ID  # Neutropenic fever, recurrent  Episode #2:  3/13: Febrile 100.4 overnight x3/13. No localizing/new symptoms. OVSS. Patient was wearing heated jacket that she attributed to temperature, no documentation noted of notifying cross cover MD. No further ID workup (BCx, UA, UC, or CXR) or broadening of abx completed. Given prolonged period on AM chart check x3/14  since documented fever and patient has remained afebrile and OVSS, will defer further workup/initiation of abx.   Episode #3:  3/19: Tmax 100.6 overnight. Asymptomatic, although notes sweating upon waking the following morning. Started IV Cefepime 2 g q8h (3/18-X). CXR with scattered patchy densities, especially in the lung bases. Respiratory panel negative. UA with protein and blood, no sign of infection. UCx and BCx NGTD. Of note, taking APAP daily for headaches which may be suppressing fevers.   - Will plan to complete 7 day course, Cefepime 2 g q8h (3/18-3/25), end date ordered.   - Continue to monitor for fever recurrence.    # Influenza A, resolved  Episode #1 of neutropenic fever  2/25: On admission, patient noted subjective fever with chills and rhinitis beginning 2/25 PM prior to admission. No other localizing s/sx of infection. She was afebrile on admission but spiked a low-grade fever to 100.7 on 2/26 PM. Blood and urine cultures negative, CXR with streaky opacities but no burt consolidation. Work-up positive for influenza A, and she completed a course of Tamiflu 75 mg BID x5 days (2/26-3/3). She also received a short empiric course of IV cefepime (2/26-3/1) given concurrent neutropenia.    # ID prophylaxis  - Acyclovir 400 mg BID  - Levaquin 500 mg daily - on hold with cefepime as above  - Posaconazole 300 mg daily   - Vori w/ $1.60 copay, posa requiring PA also w/ $1.60 copay. Rotated from deny to vori (3/13-3/17) w/ completion of chemotherapy, then transitioned to posa x3/18 given visual hallucinations x3/17     GI/  # GERD  # Epigastric pain  Reported recent increase in symptoms in the days leading up to admission and initiated famotidine.   - 3/9: Patient reported onset of midsternal chest pain that felt like reflux symptoms shortly after eating lunch. EKG NSR. States on 3/8, she experienced episode of emesis shortly after eating without preceding symptoms, no nausea at the time. States this has  happened before on occasion and has a history of this happening in the past.  No radiation of pain. She also wondered if this may be related to the nicotine patch which she removed as she was also experiencing tachycardia, though had also been drinking energy drinks which she is trying to cut back on. Pain is not reproducible on exam. Also feels anxiety is attributing to some of her symptoms. Increased famotidine to 20 mg BID x3/9.   - 3/14: Ongoing GERD symptoms w/ epigastric pain, dull pain localized to epigastric region, worsened w/ N/V and anxiety. Rates 2-3/10, no TTP or reproducible symptoms. Denies melena, dark tarry stools, hematemesis w/ emetic episodes x3/13, or other sx c/f UGIB. Of note, AM Hgb 6.1, will transfuse 1 unit pRBCs and follow for recheck. Add daily PPI to regimen x3/14.  - 3/19: Epigastric pain worsening, no longer relieved with eructation or GERD precautions provided by SLP. EKG NSR, Troponin and Lipase wnl. GI consulted for progressive pain, recommending increased PPI with CT chest/abd to assess underlying etiology. Epigastric pain improved 3/20 with increasing PPI to BID x3/19 and GI cocktail overnight. CT chest/abd with esophagitis and small hiatal hernia. If ongoing non-specific epigastric pain despite regimen below in the absence of c/f opportunistic infection, could consider trial of sumatriptan for potential abdominal migraine given h/o chronic migraines.   - SLP consulted 3/10 - Ok for regular diet w/ thin liquids, signed off 3/13  - Continue to encourage lifestyle modifications: avoid straws, carbonated beverages, GERD precautions  - Famotidine 20 mg BID   - Protonix 40 mg BID   - GI cocktail prn  - TUMS prn  Per GI, if patient fails conservative management can reassess risks/benefits of EGD upon count recovery.    # Risk of malnutrition  Secondary to esophagitis/GERD/mucositis picture as above, patient intake has decreased to small servings, mostly eating soup. On regular adult  diet with thin liquids per SLP. Recommended trial of high caloric liquids, patient tried Strawberry Ensure and plans to continue to order with meals.  - RD following; appreciate recs     # Nausea/vomiting, recurrent  Patient noted ongoing nausea w/ occasional vomiting starting Dec 2024. Has been managed with PRN Zofran.  Endorsed nausea on admission, now resolved.   - PRN Zofran and compazine  - Could consider trial of PRN Zyprexa if 3rd agent is desired/required     PULM  # COPD  Longstanding history of COPD. On admission patient reports symptoms are manageable and no recent exacerbations. Most recent PFTs (2018) were normal.  - Continue PTA Breo Ellipta inhaler and PRN DuoNebs      CV  # Essential hypertension  - Continue PTA lisinopril - HELD 3/11 w/ soft BPs (90s-100s/50s-60s), resume pending daily trends    RENAL/FEN  # Stage 2 CKD  Baseline Cr ~ 0.7. On admission Cr 0.77.  - Continue to trend on daily labs and encourage PO hydration      NEURO  # Chronic migraine w/o aura  # Chronic headaches  Present since childhood. Reportedly triggered by neck manipulation/movement. Reportedly well-managed with regimen below. Headaches occurring throughout admission with daily APAP use, also chronic and managed with APAP PRN at home. Patient denies acute worsening of headache or neck pain, vision changes, numbness/tingling of extremities.  - APAP PRN  - Continue PTA sumatriptan and cyclobenzaprine PRN  - Continue to monitor      MISC  # BROOKS  # MDD  # At risk for adjustment disorder  Patient reports good control of anxiety/depressive symptoms prior to admission. Did note feeling overwhelmed by new diagnosis. On admission discussed inpatient services such as health psychology or cordelia, she respectfully declined though will consider. Ongoing discussion regarding symptoms of anxiety and depression continued throughout admission; patient endorses continuous chemo limiting accessibility to previously frequent trips outside has  affected symptoms, feeling hopeful w/ cytarabine to complete tomorrow PM. Discussed increase in dose of Paxil and further d/w health psych; she declined at this time. C/w prn atarax and Paxil.   - Readdress health psych consult as indicated  - Continue PTA paroxetine   - Atarax as needed     # Tobacco use   Has smoked since age 16, 1.5 ppd (roughly 65 pack years). Attempting to cut back as recently as 01/2025 to 0.5 ppd. We discussed the risks of smoking during induction chemotherapy including increased risk for infection in setting of severe neutropenia that could further complicate course, even leading to death. She expressed understanding and appreciation of conversation.   - Encourage smoking cessation, readdress NRT as needed.   - Nicotine patch ordered 2/28. Patient had been intermittently declining patch and reported associated increased symptoms of anxiety, and felt like patch precipitated anxiety attack w/ chest pain on 3/9. Discontinued 3/11, offered trial of lower dose patch or alternative NRT that patient declined.    - Discussed option of smoking cessation team, patient declined at this time.    Chronic Problems:  # Vitamin D deficiency - Continue PTA vit D supplement  # Seasonal allergies - Claritin 10 mg daily  # Rheumatoid arthritis - Patient reported trying treatment though was unable to tolerate well. Managed with Tylenol PRN. Most recent RF >650 (2/10/25). JI negative.    # Prediabetes - Last A1c 6.0 x12/9/24. Has been managing with lifestyle modifications.   # Degenerative disc disease, L5-S1 - Continue PTA gabapentin PRN  # Mixed hyperlipidemia - Lipid panel has remained at goal, 1/14/25 panel w/ cholesterol 163, , LDL 92, HDL 45.   - Held PTA atorvastatin on admission due to chemotherapy interactions. Consider rotation to rosuvastatin for better drug-drug interaction profile.   # H/o aortic stenosis - Patient noted on admission longstanding history of aortic stenosis. Pre-chemo echo w/  "EF 60-65% and mild aortic stenosis and insufficiency. No previous echo to compare.     Resolved Hospital Problems:  # Mild hyponatremia, resolved - New mild hyponatremia noted 3/13 with Na 135 ? 130. Asymptomatic. Now resolved.  # Non-radiating chest pain, resolved - for details see note from 3/21 and prior   # Urinary frequency + Dysuria, resolved - On admission, patient noted longstanding history of urinary frequency though new mild dysuria. UA (2/26) negative, UC with no growth. Symptoms have since resolved.  # R AOM with purulent effusion and Small L TM perforation, resolved - ENT consulted, appreciate recs. Completed 7-day course Levaquin 750mg daily and 5 day course of floxin drops to left ear for perforation     Clinically Significant Risk Factors           # Hypocalcemia: Lowest Ca = 8.6 mg/dL in last 2 days, will monitor and replace as appropriate     # Hypoalbuminemia: Lowest albumin = 3.3 g/dL at 3/22/2025  6:00 AM, will monitor as appropriate   # Thrombocytopenia: Lowest platelets = 28 in last 2 days, will monitor for bleeding   # Hypertension: Noted on problem list            # Overweight: Estimated body mass index is 29.71 kg/m  as calculated from the following:    Height as of this encounter: 1.626 m (5' 4\").    Weight as of this encounter: 78.5 kg (173 lb 1.6 oz).        # Financial/Environmental Concerns: none  # Asthma: noted on problem list          Fluids/Electrolytes/Nutrition   - IVF bolus PRN   - PRN lyte replacement per standing protocol  - Regular diet as tolerated      Lines: PICC     PPX  VTE: TCP, Lovenox held 3/13  GI: Pepcid, Protonix  Bowels: prn senna and miralax  Activity: as tolerated      Code Status: DNR/DNI    Disposition: Admitted for further workup and management. Discharge pending treatment decisions and clinical course.   Follow-up: Will need to request APPT18 closer to discharge date and determine primary oncologist. Messaged Dr. Harris x3/21 for primary oncologist preference, " as Alejandra would like to follow with her at the [facility] if possible.    Medically Ready for Discharge:        Staffed with Dr. Moreno.    I spent 45 minutes in the care of this patient today, which included time necessary for review of interval events, obtaining history and physical exam, ordering medication(s)/test(s) as medically indicated, discussion with interdisciplinary/consult team(s), care coordination, and documentation time.     Jyoti Tenorio PA-C  Hematology/Oncology  Pager: #[phone]    Interval History   Nursing notes reviewed. No acute events overnight. Afebrile and vitally stable.  Alejandra is feeling ok today. States her epigastric pain is improving each day. Bright spirits. Watching TV and coloring. No new symptoms noted today.   Appetite ok, continues to try to optimize her nutrition with snacks/supplements.   Energy level ok.   Denies fever, chills, mouth sores, SOB, cough, abdominal pain, diarrhea, constipation, nausea, vomiting, dysuria, hematuria, numbness, tingling, swelling  All questions answered at bedside.     A comprehensive review of symptoms was performed and was negative except as detailed in the interval history above.    Physical Exam   Vital Signs with Ranges  Temp:  [97.7  F (36.5  C)-98.8  F (37.1  C)] 97.7  F (36.5  C)  Pulse:  [72-79] 79  Resp:  [18-22] 20  BP: (102-136)/(58-90) 124/76  SpO2:  [95 %-97 %] 97 %    I/O last 3 completed shifts:  In: 1837 [P.O.:1237; I.V.:200; IV Piggyback:100]  Out: 800 [Urine:800]    Vitals:    03/19/25 0836 03/20/25 0755 03/22/25 0823   Weight: 77.8 kg (171 lb 8 oz) 77.5 kg (170 lb 14.4 oz) 78.5 kg (173 lb 1.6 oz)     Constitutional: Pleasant and cooperative female, in NAD. Alert, interactive, non-toxic. Smiling and conversational.  HEENT: NC/AT, sclera clear, conjunctiva normal, OP with MMM, no discrete intraoral lesions or thrush, poor dentition.  Respiratory: No increased work of breathing, no crackles or wheezing. Breath  sounds mildly diminished in right lung base.  Cardiovascular: RRR, systolic murmur. No peripheral edema. No calf tenderness or palpable cords.  GI: Normal bowel sounds. Abdomen with central obesity, is soft, non-distended and non-tender to palpation.  Skin: Warm, dry, well-perfused. No bruising, bleeding, rashes, or lesions on limited exam. BMBx site bandaged.   Musculoskeletal: Diminished muscle bulk, no gross deformities.  Neurologic: A&O. Answers questions appropriately, speech normal. Moves all extremities spontaneously.  Psychiatric: Normal mood and affect.  Vascular access:  PICC on RUE CDI, non-tender, no surrounding erythema.    Medications   Current Facility-Administered Medications   Medication Dose Route Frequency Provider Last Rate Last Admin     Current Facility-Administered Medications   Medication Dose Route Frequency Provider Last Rate Last Admin    acyclovir (ZOVIRAX) tablet 400 mg  400 mg Oral BID Chelsie Murphy PA-C   400 mg at 03/22/25 0932    ceFEPIme (MAXIPIME) 2 g vial to attach to  mL bag for ADULTS or NS 50 mL bag for PEDS  2 g Intravenous Q8H Nery Crowell PA-C 200 mL/hr at 03/21/25 2021 2 g at 03/22/25 1201    famotidine (PEPCID) tablet 20 mg  20 mg Oral BID Zully Garcia PA-C   20 mg at 03/22/25 0932    fluticasone-vilanterol (BREO ELLIPTA) 100-25 MCG/ACT inhaler 1 puff  1 puff Inhalation Daily Chelsie Murphy PA-C   1 puff at 03/22/25 0931    heparin lock flush 10 unit/mL injection 5-20 mL  5-20 mL Intracatheter Q24H Chelsie Murphy PA-C   5 mL at 03/22/25 0601    [Held by provider] levofloxacin (LEVAQUIN) tablet 500 mg  500 mg Oral Daily Zully Garcia PA-C   500 mg at 03/18/25 0916    [Held by provider] lisinopril (ZESTRIL) tablet 10 mg  10 mg Oral Daily Jyoti Tenorio PA-C   10 mg at 03/10/25 1039    loratadine (CLARITIN) tablet 10 mg  10 mg Oral Daily Chelsie Murphy PA-C   10 mg at 03/22/25 0932     pantoprazole (PROTONIX) EC tablet 40 mg  40 mg Oral BID AC Nery Crowell PA-C   40 mg at 03/22/25 0932    PARoxetine (PAXIL) tablet 20 mg  20 mg Oral Chelsie Lowery PA-C   20 mg at 03/22/25 0931    posaconazole (NOXAFIL) DR tablet TBEC 300 mg  300 mg Oral Nery Dubose PA-C   300 mg at 03/22/25 0932    sodium chloride (PF) 0.9% PF flush 10-40 mL  10-40 mL Intracatheter Q8H Chelsie Murphy PA-C   10 mL at 03/21/25 0745    sucralfate (CARAFATE) suspension 1 g  1 g Oral 4x Daily AC & HS Aman Cervantes MD   1 g at 03/22/25 1202    Vitamin D3 (CHOLECALCIFEROL) tablet 1,000 Units  1,000 Units Oral Daily Chelsie Murphy PA-C   1,000 Units at 03/22/25 0932     Antiinfectives  Anti-infectives (From now, onward)      Start     Dose/Rate Route Frequency Ordered Stop    03/21/25 1200  ceFEPIme (MAXIPIME) 2 g vial to attach to  mL bag for ADULTS or NS 50 mL bag for PEDS         2 g  over 30 Minutes Intravenous EVERY 8 HOURS 03/21/25 1051 03/25/25 1200    03/18/25 0800  posaconazole (NOXAFIL) DR tablet TBEC 300 mg         300 mg Oral EVERY MORNING 03/17/25 1242      03/11/25 0800  [Held by provider]  levofloxacin (LEVAQUIN) tablet 500 mg        (On hold since Tue 3/18/2025 at 2010 until manually unheld; held by Fer Cole MDHold Reason: OtherHold Comments: Hold while on cefepime)    500 mg Oral DAILY 03/04/25 1102      02/26/25 2000  acyclovir (ZOVIRAX) tablet 400 mg         400 mg Oral 2 TIMES DAILY 02/26/25 1307            Data   CBC   Recent Labs   Lab 03/22/25  0600 03/21/25  0307 03/20/25  0212 03/19/25  0248   WBC 0.7* 0.6* 0.8* 0.7*   RBC 2.46* 2.07* 2.37* 2.09*   HGB 7.9* 6.8* 7.6* 7.0*   HCT 22.1* 18.8* 21.5* 19.6*   MCV 90 91 91 94   MCH 32.1 32.9 32.1 33.5*   MCHC 35.7 36.2 35.3 35.7   RDW 15.4* 15.9* 16.3* 16.7*   PLT 28* 40* 10* 17*     CMP   Recent Labs   Lab 03/22/25  0600 03/21/25  0307 03/20/25  0212 03/19/25  1108  03/19/25  0248    138 139  --  133*   POTASSIUM 3.6 3.7 3.9 3.8 3.0*   CHLORIDE 107 105 108*  --  101   CO2 24 22 22  --  22   ANIONGAP 10 11 9  --  10   * 113* 112*  --  136*   BUN 12.7 9.2 9.6  --  10.7   CR 0.57 0.56 0.63  --  0.66   GFRESTIMATED >90 >90 >90  --  >90   TOBIN 8.8 8.6* 8.8  --  8.5*   MAG 2.0 1.9 2.0  --  1.8   PHOS 3.5 3.1 3.3  --  3.2   PROTTOTAL 6.0* 6.0* 6.2*  --  6.1*   ALBUMIN 3.3* 3.3* 3.3*  --  3.3*   BILITOTAL 0.4 0.3 0.4  --  0.4   ALKPHOS 105 101 102  --  106   AST 10 10 9  --  8   ALT 9 9 7  --  7     LFTs   Recent Labs   Lab 03/22/25  0600   PROTTOTAL 6.0*   ALBUMIN 3.3*   BILITOTAL 0.4   ALKPHOS 105   AST 10   ALT 9     Coagulation Studies   Recent Labs   Lab 03/21/25  0307   INR 1.08   PTT 28

## 2025-03-22 NOTE — PLAN OF CARE
Goal Outcome Evaluation:      Plan of Care Reviewed With: patient    Overall Patient Progress: no change    Outcome Evaluation: AML.  Intensive induction with 7+3 (D1=3/5/25)      AVSS.  96% RA.  Pt c/o HA - tylenol x1.  Denies nausea.  (Previous shifts - has had epigastric pain/discomfort).  UAL.  Outside to smoke independently.  Voiding large vols.  R PICC - HL after meds, labs.  Cont with POC.

## 2025-03-22 NOTE — PLAN OF CARE
"Goal Outcome Evaluation:      Plan of Care Reviewed With: patient    Overall Patient Progress: no changeOverall Patient Progress: no change    Outcome Evaluation: 0700- 1500 D18 of 7+3     BP (!) 146/78 (BP Location: Left arm)   Pulse 80   Temp 98.3  F (36.8  C) (Oral)   Resp 18   Ht 1.626 m (5' 4\")   Wt 78.5 kg (173 lb 1.6 oz)   LMP 01/01/2007 (Approximate)   SpO2 97%   BMI 29.71 kg/m      VSS on RA, Afebrile. Denies SOB, N/V. PRN Tylenol x1 for HA. PRN MMW for help with taking meds. PRN Atarax for anxiety. Last BM 3/21. Fair appetite. Encouraged oral intake. Frequent smoke breaks outside. Pt reports heartburn/epigastric pain is improving. Continue POC.   "

## 2025-03-23 LAB
ALBUMIN SERPL BCG-MCNC: 3.2 G/DL (ref 3.5–5.2)
ALP SERPL-CCNC: 111 U/L (ref 40–150)
ALT SERPL W P-5'-P-CCNC: 9 U/L (ref 0–50)
ANION GAP SERPL CALCULATED.3IONS-SCNC: 12 MMOL/L (ref 7–15)
AST SERPL W P-5'-P-CCNC: 11 U/L (ref 0–45)
BACTERIA BLD CULT: NO GROWTH
BACTERIA BLD CULT: NO GROWTH
BASOPHILS # BLD AUTO: 0 10E3/UL (ref 0–0.2)
BASOPHILS NFR BLD AUTO: 0 %
BILIRUB SERPL-MCNC: 0.3 MG/DL
BUN SERPL-MCNC: 13.2 MG/DL (ref 6–20)
CALCIUM SERPL-MCNC: 8.8 MG/DL (ref 8.8–10.4)
CHLORIDE SERPL-SCNC: 106 MMOL/L (ref 98–107)
CREAT SERPL-MCNC: 0.55 MG/DL (ref 0.51–0.95)
EGFRCR SERPLBLD CKD-EPI 2021: >90 ML/MIN/1.73M2
EOSINOPHIL # BLD AUTO: 0 10E3/UL (ref 0–0.7)
EOSINOPHIL NFR BLD AUTO: 0 %
ERYTHROCYTE [DISTWIDTH] IN BLOOD BY AUTOMATED COUNT: 15.1 % (ref 10–15)
GLUCOSE SERPL-MCNC: 111 MG/DL (ref 70–99)
HCO3 SERPL-SCNC: 21 MMOL/L (ref 22–29)
HCT VFR BLD AUTO: 22.6 % (ref 35–47)
HGB BLD-MCNC: 8.2 G/DL (ref 11.7–15.7)
IMM GRANULOCYTES # BLD: 0 10E3/UL
IMM GRANULOCYTES NFR BLD: 0 %
LDH SERPL L TO P-CCNC: 165 U/L (ref 0–250)
LYMPHOCYTES # BLD AUTO: 0.5 10E3/UL (ref 0.8–5.3)
LYMPHOCYTES NFR BLD AUTO: 90 %
MAGNESIUM SERPL-MCNC: 2 MG/DL (ref 1.7–2.3)
MCH RBC QN AUTO: 32.5 PG (ref 26.5–33)
MCHC RBC AUTO-ENTMCNC: 36.3 G/DL (ref 31.5–36.5)
MCV RBC AUTO: 90 FL (ref 78–100)
MONOCYTES # BLD AUTO: 0 10E3/UL (ref 0–1.3)
MONOCYTES NFR BLD AUTO: 5 %
NEUTROPHILS # BLD AUTO: 0 10E3/UL (ref 1.6–8.3)
NEUTROPHILS NFR BLD AUTO: 5 %
NRBC # BLD AUTO: 0 10E3/UL
NRBC BLD AUTO-RTO: 0 /100
PHOSPHATE SERPL-MCNC: 3.3 MG/DL (ref 2.5–4.5)
PLAT MORPH BLD: NORMAL
PLATELET # BLD AUTO: 31 10E3/UL (ref 150–450)
POTASSIUM SERPL-SCNC: 3.6 MMOL/L (ref 3.4–5.3)
PROT SERPL-MCNC: 6 G/DL (ref 6.4–8.3)
RBC # BLD AUTO: 2.52 10E6/UL (ref 3.8–5.2)
RBC MORPH BLD: NORMAL
SODIUM SERPL-SCNC: 139 MMOL/L (ref 135–145)
URATE SERPL-MCNC: 2.1 MG/DL (ref 2.4–5.7)
WBC # BLD AUTO: 0.6 10E3/UL (ref 4–11)

## 2025-03-23 PROCEDURE — 120N000002 HC R&B MED SURG/OB UMMC

## 2025-03-23 PROCEDURE — 250N000013 HC RX MED GY IP 250 OP 250 PS 637

## 2025-03-23 PROCEDURE — 250N000011 HC RX IP 250 OP 636

## 2025-03-23 PROCEDURE — 84100 ASSAY OF PHOSPHORUS: CPT | Performed by: PHYSICIAN ASSISTANT

## 2025-03-23 PROCEDURE — 85025 COMPLETE CBC W/AUTO DIFF WBC: CPT

## 2025-03-23 PROCEDURE — 999N000202 HC STATISTICAL VASC ACCESS NURSE TIME, 1-15 MINUTES

## 2025-03-23 PROCEDURE — 999N000007 HC SITE CHECK

## 2025-03-23 PROCEDURE — 80053 COMPREHEN METABOLIC PANEL: CPT

## 2025-03-23 PROCEDURE — 250N000013 HC RX MED GY IP 250 OP 250 PS 637: Performed by: HOSPITALIST

## 2025-03-23 PROCEDURE — 83615 LACTATE (LD) (LDH) ENZYME: CPT

## 2025-03-23 PROCEDURE — 84550 ASSAY OF BLOOD/URIC ACID: CPT | Performed by: PHYSICIAN ASSISTANT

## 2025-03-23 PROCEDURE — 83735 ASSAY OF MAGNESIUM: CPT

## 2025-03-23 PROCEDURE — 99233 SBSQ HOSP IP/OBS HIGH 50: CPT | Mod: FS | Performed by: PHYSICIAN ASSISTANT

## 2025-03-23 RX ADMIN — CEFEPIME 2 G: 2 INJECTION, POWDER, FOR SOLUTION INTRAVENOUS at 04:40

## 2025-03-23 RX ADMIN — ACYCLOVIR 400 MG: 400 TABLET ORAL at 10:47

## 2025-03-23 RX ADMIN — FLUTICASONE FUROATE AND VILANTEROL TRIFENATATE 1 PUFF: 100; 25 POWDER RESPIRATORY (INHALATION) at 10:46

## 2025-03-23 RX ADMIN — DIPHENHYDRAMINE HYDROCHLORIDE AND LIDOCAINE HYDROCHLORIDE AND ALUMINUM HYDROXIDE AND MAGNESIUM HYDRO 10 ML: KIT at 15:56

## 2025-03-23 RX ADMIN — HYDROXYZINE HYDROCHLORIDE 25 MG: 25 TABLET, FILM COATED ORAL at 20:04

## 2025-03-23 RX ADMIN — PAROXETINE HYDROCHLORIDE 20 MG: 20 TABLET, FILM COATED ORAL at 10:46

## 2025-03-23 RX ADMIN — CYCLOBENZAPRINE HYDROCHLORIDE 10 MG: 5 TABLET, FILM COATED ORAL at 20:04

## 2025-03-23 RX ADMIN — CEFEPIME 2 G: 2 INJECTION, POWDER, FOR SOLUTION INTRAVENOUS at 19:59

## 2025-03-23 RX ADMIN — SUCRALFATE ORAL 1 G: 1 SUSPENSION ORAL at 13:49

## 2025-03-23 RX ADMIN — Medication 1000 UNITS: at 10:46

## 2025-03-23 RX ADMIN — FAMOTIDINE 20 MG: 20 TABLET, FILM COATED ORAL at 10:47

## 2025-03-23 RX ADMIN — ONDANSETRON 4 MG: 2 INJECTION, SOLUTION INTRAMUSCULAR; INTRAVENOUS at 19:59

## 2025-03-23 RX ADMIN — FAMOTIDINE 20 MG: 20 TABLET, FILM COATED ORAL at 20:04

## 2025-03-23 RX ADMIN — Medication 5 ML: at 05:32

## 2025-03-23 RX ADMIN — HEPARIN, PORCINE (PF) 10 UNIT/ML INTRAVENOUS SYRINGE 5 ML: at 14:44

## 2025-03-23 RX ADMIN — HYDROXYZINE HYDROCHLORIDE 25 MG: 25 TABLET, FILM COATED ORAL at 05:22

## 2025-03-23 RX ADMIN — POSACONAZOLE 300 MG: 100 TABLET, DELAYED RELEASE ORAL at 10:46

## 2025-03-23 RX ADMIN — LORATADINE 10 MG: 10 TABLET ORAL at 10:46

## 2025-03-23 RX ADMIN — PANTOPRAZOLE SODIUM 40 MG: 40 TABLET, DELAYED RELEASE ORAL at 15:56

## 2025-03-23 RX ADMIN — SUCRALFATE ORAL 1 G: 1 SUSPENSION ORAL at 10:46

## 2025-03-23 RX ADMIN — HEPARIN, PORCINE (PF) 10 UNIT/ML INTRAVENOUS SYRINGE 5 ML: at 20:43

## 2025-03-23 RX ADMIN — SUCRALFATE ORAL 1 G: 1 SUSPENSION ORAL at 15:56

## 2025-03-23 RX ADMIN — ACYCLOVIR 400 MG: 400 TABLET ORAL at 20:04

## 2025-03-23 RX ADMIN — CEFEPIME 2 G: 2 INJECTION, POWDER, FOR SOLUTION INTRAVENOUS at 13:49

## 2025-03-23 RX ADMIN — ACETAMINOPHEN 650 MG: 325 TABLET, FILM COATED ORAL at 05:22

## 2025-03-23 RX ADMIN — SUCRALFATE ORAL 1 G: 1 SUSPENSION ORAL at 21:44

## 2025-03-23 RX ADMIN — PANTOPRAZOLE SODIUM 40 MG: 40 TABLET, DELAYED RELEASE ORAL at 10:45

## 2025-03-23 ASSESSMENT — ACTIVITIES OF DAILY LIVING (ADL)
ADLS_ACUITY_SCORE: 33

## 2025-03-23 NOTE — PLAN OF CARE
Goal Outcome Evaluation:      Plan of Care Reviewed With: patient    Overall Patient Progress: no changeOverall Patient Progress: no change    Outcome Evaluation: D 19 7+3    AML.    VSS.  93% RA.    No c/o of HA or epigastric pain this shift; no prn meds asked for or given.  UAL.  Outside to smoke independently.  Voiding good vols.  R PICC - HL after meds  Cont with POC.

## 2025-03-23 NOTE — PLAN OF CARE
Goal Outcome Evaluation:       Plan of Care Reviewed With: patient     Overall Patient Progress: no change     Outcome Evaluation: AML.  Intensive induction with 7+3 (D1=3/5/25)        AVSS.  93% RA.    Pt c/o HA - tylenol and atarax given at 0522.  Denies nausea.  (Previously- epigastric pain/discomfort).  UAL.  Outside to smoke independently.  Voiding good vols.  R PICC - HL after meds, labs.  Cont with POC.

## 2025-03-23 NOTE — PLAN OF CARE
Goal Outcome Evaluation:      Plan of Care Reviewed With: patient    Overall Patient Progress: no change    Outcome Evaluation: D18 7+3    A/Ox4. VSS on RA. Epigastric pain managed with GI cocktail x1 and scheduled sucralfate. Headache managed with PRN tylenol x1. PRN atarax given for anxiety. Denies nausea, SOB, and dizziness. UAL, frequent walks. Regular diet with fair appetite. LBM 3/22 and is passing gas. Voiding spontaneously but not saving. PICC is heparin locked on both lumens. Chemo complete and awaiting count recovery. Continue with POC.

## 2025-03-23 NOTE — PROGRESS NOTES
[facility]    Progress Note  Hematology / Oncology     Date of Admission:  2/26/2025  Hospital Day #: 25   Date of Service (when I saw the patient): 03/23/2025    Assessment & Plan   Alejandra Villegas is a 59 year old female with a past medical history significant for COPD, migraines, rheumatoid arthritis, aortic stenosis, Afib, and anxiety who presented to an outside hospital with cytopenias and was found on BMBx to have hypercellular marrow with 4% blasts, 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 She was subsequently admitted to [facility] on 2/26/25 for further work-up and management and pathology re-review was most consistent with AML with NPM1 mutation by WHO 2022 (which does not require a specific blast threshold). Following further multidisciplinary discussion, she started intensive induction with 7+3 (D1=3/5/25). Her course has been complicated by neutropenic fevers, influenza A, AOM, and L TM perforation.     TODAY:  - D19 7+3 induction. Tolerating well.  - Continued improvement in epigastric pain with maximal acid suppression with BID PPI, GI cocktail, and antireflux regimen     HEME  # AML with NPM1 mutation  Had been seen by PCP Dec 2024 w/ progressive fatigue and nausea where she was found leukopenic WBC  2.4 and neutropenic w/ ANC 0.3. Hgb 12. Was referred to local hematology/oncology clinic for further evaluation and seen by Dr. Samina Harris. BMBx 2/10/25 done at OSH showed hypercellular marrow w/ trilineage hematopoiesis w/ dyspoiesis with mildly elevated blasts of 4% on morphology. NGS w/ NPM1, IDH2, and NRAS mutations. She was admitted to [facility] 2/26/25 for further workup and management. Slides were re-reviewed by [facility] Hematopathology, who noted hypercellular marrow with 8% blasts by morphology. Despite relatively low blast percentage, findings were felt most consistent with a diagnosis AML with NPM1 mutation by WHO 2022 (which does not  require a specific blast threshold). Following interdepartmental discussions and review of the available literature, patient was started on intensive induction with 7+3 (Day 1=3/5/25).   - PICC placed 3/5/2025, plan to discontinue on discharge.   - Baseline cardiopulmonary studies:  - Echo w/ EF 60-65%, mild known aortic stenosis.   - EKG (2/27) with NSR, QTc 412  - CXR (2/26) with mildly increased streaky bibasilar opacities, atelectasis or edema. No consolidation.   - Baseline viral serologies: CMV IgG+, EBV IgG+, HepB sAg-, HepB cAb-, HepB sAb-, HSV1+/2+, HIV-  - HLA Typing sent on admission. Message sent to notify BMT team x2/27 for IP consult.   - Midcycle BMBx 3/19/25: Flow with no increase in myeloid blasts and no abnormal myeloid blast population. Morphology with no morphologic or immunophenotypic evidence of acute myeloid leukemia.                 Treatment plan: 7+3 (C1D1=3/5/2025)  - Daunorubicin 60 mg/m  IV D1-3  - Cytarabine 100 mg/m  IV D1-7    Supportive medications: zofran, dexamethasone 12 mg D1-3     # Pancytopenia  Secondary to underlying hematologic malignancy and exacerbated by recent chemotherapy.  - Follow daily CBC with differential  - Transfuse to keep Hgb >7, plt >10K  - Blood consent obtained 2/26/25 on admission and placed in patient's paper chart.      # Risk for TLS  # Hyperphosphatemia, resolved  Secondary to hematologic malignancy, no evidence of TLS on admission.   - Allopurinol 300 mg daily x7 days (through 3/11)  - Follow TLS labs daily     # Risk for DIC  Secondary to underlying hematologic malignancy.  - Follow coags every MWF     ID  # Neutropenic fever, recurrent  Episode #2:  3/13: Febrile 100.4 overnight x3/13. No localizing/new symptoms. OVSS. Patient was wearing heated jacket that she attributed to temperature, no documentation noted of notifying cross cover MD. No further ID workup (BCx, UA, UC, or CXR) or broadening of abx completed. Given prolonged period on AM chart  check x3/14 since documented fever and patient has remained afebrile and OVSS, will defer further workup/initiation of abx.   Episode #3:  3/19: Tmax 100.6 overnight. Asymptomatic, although notes sweating upon waking the following morning. Started IV Cefepime 2 g q8h (3/18-X). CXR with scattered patchy densities, especially in the lung bases. Respiratory panel negative. UA with protein and blood, no sign of infection. UCx and BCx NGTD. Of note, taking APAP daily for headaches which may be suppressing fevers.   - Will plan to complete 7 day course, Cefepime 2 g q8h (3/18-3/25), end date ordered.   - Continue to monitor for fever recurrence.    # Influenza A, resolved  Episode #1 of neutropenic fever  2/25: On admission, patient noted subjective fever with chills and rhinitis beginning 2/25 PM prior to admission. No other localizing s/sx of infection. She was afebrile on admission but spiked a low-grade fever to 100.7 on 2/26 PM. Blood and urine cultures negative, CXR with streaky opacities but no burt consolidation. Work-up positive for influenza A, and she completed a course of Tamiflu 75 mg BID x5 days (2/26-3/3). She also received a short empiric course of IV cefepime (2/26-3/1) given concurrent neutropenia.    # ID prophylaxis  - Acyclovir 400 mg BID  - Levaquin 500 mg daily - on hold with cefepime as above  - Posaconazole 300 mg daily   - Vori w/ $1.60 copay, posa requiring PA also w/ $1.60 copay. Rotated from deny to vori (3/13-3/17) w/ completion of chemotherapy, then transitioned to posa x3/18 given visual hallucinations x3/17     GI/  # GERD  # Epigastric pain  Reported recent increase in symptoms in the days leading up to admission and initiated famotidine.   - 3/9: Patient reported onset of midsternal chest pain that felt like reflux symptoms shortly after eating lunch. EKG NSR. States on 3/8, she experienced episode of emesis shortly after eating without preceding symptoms, no nausea at the time.  States this has happened before on occasion and has a history of this happening in the past.  No radiation of pain. She also wondered if this may be related to the nicotine patch which she removed as she was also experiencing tachycardia, though had also been drinking energy drinks which she is trying to cut back on. Pain is not reproducible on exam. Also feels anxiety is attributing to some of her symptoms. Increased famotidine to 20 mg BID x3/9.   - 3/14: Ongoing GERD symptoms w/ epigastric pain, dull pain localized to epigastric region, worsened w/ N/V and anxiety. Rates 2-3/10, no TTP or reproducible symptoms. Denies melena, dark tarry stools, hematemesis w/ emetic episodes x3/13, or other sx c/f UGIB. Of note, AM Hgb 6.1, will transfuse 1 unit pRBCs and follow for recheck. Add daily PPI to regimen x3/14.  - 3/19: Epigastric pain worsening, no longer relieved with eructation or GERD precautions provided by SLP. EKG NSR, Troponin and Lipase wnl. GI consulted for progressive pain, recommending increased PPI with CT chest/abd to assess underlying etiology. Epigastric pain improved 3/20 with increasing PPI to BID x3/19 and GI cocktail overnight. CT chest/abd with esophagitis and small hiatal hernia. If ongoing non-specific epigastric pain despite regimen below in the absence of c/f opportunistic infection, could consider trial of sumatriptan for potential abdominal migraine given h/o chronic migraines.   - SLP consulted 3/10 - Ok for regular diet w/ thin liquids, signed off 3/13  - Continue to encourage lifestyle modifications: avoid straws, carbonated beverages, GERD precautions  - Famotidine 20 mg BID   - Protonix 40 mg BID   - GI cocktail prn  - TUMS prn  Per GI, if patient fails conservative management can reassess risks/benefits of EGD upon count recovery.    # Risk of malnutrition  Secondary to esophagitis/GERD/mucositis picture as above, patient intake has decreased to small servings, mostly eating soup. On  regular adult diet with thin liquids per SLP. Recommended trial of high caloric liquids, patient tried Strawberry Ensure and plans to continue to order with meals.  - RD following; appreciate recs     # Nausea/vomiting, improving  Patient noted ongoing nausea w/ occasional vomiting starting Dec 2024. Has been managed with PRN Zofran.  Endorsed nausea on admission, now resolved.   - PRN Zofran and compazine  - Could consider trial of PRN Zyprexa if 3rd agent is desired/required     PULM  # COPD  Longstanding history of COPD. On admission patient reports symptoms are manageable and no recent exacerbations. Most recent PFTs (2018) were normal.  - Continue PTA Breo Ellipta inhaler and PRN DuoNebs      CV  # Essential hypertension  - Continue PTA lisinopril - HELD 3/11 w/ soft BPs (90s-100s/50s-60s), resume pending daily trends    RENAL/FEN  # Stage 2 CKD  Baseline Cr ~ 0.7. On admission Cr 0.77.  - Continue to trend on daily labs and encourage PO hydration      NEURO  # Chronic migraine w/o aura  # Chronic headaches  Present since childhood. Reportedly triggered by neck manipulation/movement. Reportedly well-managed with regimen below. Headaches occurring throughout admission with daily APAP use, also chronic and managed with APAP PRN at home. Patient denies acute worsening of headache or neck pain, vision changes, numbness/tingling of extremities.  - APAP PRN  - Continue PTA sumatriptan and cyclobenzaprine PRN  - Continue to monitor      MISC  # BROOKS  # MDD  # At risk for adjustment disorder  Patient reports good control of anxiety/depressive symptoms prior to admission. Did note feeling overwhelmed by new diagnosis. On admission discussed inpatient services such as health psychology or cordelia, she respectfully declined though will consider. Ongoing discussion regarding symptoms of anxiety and depression continued throughout admission; patient endorses continuous chemo limiting accessibility to previously frequent  trips outside has affected symptoms, feeling hopeful w/ cytarabine to complete tomorrow PM. Discussed increase in dose of Paxil and further d/w health psych; she declined at this time. C/w prn atarax and Paxil.   - Readdress health psych consult as indicated  - Continue PTA paroxetine   - Atarax as needed     # Tobacco use   Has smoked since age 16, 1.5 ppd (roughly 65 pack years). Attempting to cut back as recently as 01/2025 to 0.5 ppd. We discussed the risks of smoking during induction chemotherapy including increased risk for infection in setting of severe neutropenia that could further complicate course, even leading to death. She expressed understanding and appreciation of conversation.   - Encourage smoking cessation, readdress NRT as needed.   - Nicotine patch ordered 2/28. Patient had been intermittently declining patch and reported associated increased symptoms of anxiety, and felt like patch precipitated anxiety attack w/ chest pain on 3/9. Discontinued 3/11, offered trial of lower dose patch or alternative NRT that patient declined.    - Discussed option of smoking cessation team, patient declined at this time.    Chronic Problems:  # Vitamin D deficiency - Continue PTA vit D supplement  # Seasonal allergies - Claritin 10 mg daily  # Rheumatoid arthritis - Patient reported trying treatment though was unable to tolerate well. Managed with Tylenol PRN. Most recent RF >650 (2/10/25). JI negative.    # Prediabetes - Last A1c 6.0 x12/9/24. Has been managing with lifestyle modifications.   # Degenerative disc disease, L5-S1 - Continue PTA gabapentin PRN  # Mixed hyperlipidemia - Lipid panel has remained at goal, 1/14/25 panel w/ cholesterol 163, , LDL 92, HDL 45.   - Held PTA atorvastatin on admission due to chemotherapy interactions. Consider rotation to rosuvastatin for better drug-drug interaction profile.   # H/o aortic stenosis - Patient noted on admission longstanding history of aortic stenosis.  "Pre-chemo echo w/ EF 60-65% and mild aortic stenosis and insufficiency. No previous echo to compare.     Resolved Hospital Problems:  # Mild hyponatremia, resolved - New mild hyponatremia noted 3/13 with Na 135 ? 130. Asymptomatic. Now resolved.  # Non-radiating chest pain, resolved - for details see note from 3/21 and prior   # Urinary frequency + Dysuria, resolved - On admission, patient noted longstanding history of urinary frequency though new mild dysuria. UA (2/26) negative, UC with no growth. Symptoms have since resolved.  # R AOM with purulent effusion and Small L TM perforation, resolved - ENT consulted, appreciate recs. Completed 7-day course Levaquin 750mg daily and 5 day course of floxin drops to left ear for perforation     Clinically Significant Risk Factors               # Hypoalbuminemia: Lowest albumin = 3.2 g/dL at 3/23/2025  5:32 AM, will monitor as appropriate   # Thrombocytopenia: Lowest platelets = 28 in last 2 days, will monitor for bleeding   # Hypertension: Noted on problem list            # Overweight: Estimated body mass index is 29.66 kg/m  as calculated from the following:    Height as of this encounter: 1.626 m (5' 4\").    Weight as of this encounter: 78.4 kg (172 lb 12.8 oz).        # Financial/Environmental Concerns: none  # Asthma: noted on problem list          Fluids/Electrolytes/Nutrition   - IVF bolus PRN   - PRN lyte replacement per standing protocol  - Regular diet as tolerated      Lines: PICC     PPX  VTE: TCP, Lovenox held 3/13  GI: Pepcid, Protonix  Bowels: prn senna and miralax  Activity: as tolerated      Code Status: DNR/DNI    Disposition: Admitted for further workup and management. Discharge pending treatment decisions and clinical course.   Follow-up: Will need to request APPT18 closer to discharge date and determine primary oncologist. Messaged Dr. Harris x3/21 for primary oncologist preference, as Alejandra would like to follow with her at the [facility] if possible.    Medically Ready for Discharge:        Staffed with Dr. Moreno.    I spent >35 minutes in the care of this patient today, which included time necessary for review of interval events, obtaining history and physical exam, ordering medication(s)/test(s) as medically indicated, discussion with interdisciplinary/consult team(s), care coordination, and documentation time.     Jyoti Tenorio PA-C  Hematology/Oncology  Pager: #[phone]    Interval History   Nursing notes reviewed. No acute events overnight. Afebrile and vitally stable.  Alejandra is feeling better today. States that she is overall feeling much better than when she first come in. Bright spirits. Slept very well over night. Epigastric pain continues to improve in both frequency and severity. Shared that she has 3 dogs and a cat and cannot wait to get home to them. No new symptoms noted today.   Appetite ok, continues to try to optimize her nutrition with snacks/supplements.   Energy level ok.   Denies fever, chills, mouth sores, SOB, cough, abdominal pain, diarrhea, constipation, nausea, vomiting, dysuria, hematuria, numbness, tingling, swelling  All questions answered at bedside.     A comprehensive review of symptoms was performed and was negative except as detailed in the interval history above.    Physical Exam   Vital Signs with Ranges  Temp:  [97.7  F (36.5  C)-98.8  F (37.1  C)] 97.7  F (36.5  C)  Pulse:  [74-85] 82  Resp:  [18-20] 20  BP: (122-162)/(68-85) 135/79  SpO2:  [93 %-97 %] 97 %    I/O last 3 completed shifts:  In: 777 [P.O.:577; IV Piggyback:200]  Out: 1500 [Urine:1500]    Vitals:    03/20/25 0755 03/22/25 0823 03/23/25 1018   Weight: 77.5 kg (170 lb 14.4 oz) 78.5 kg (173 lb 1.6 oz) 78.4 kg (172 lb 12.8 oz)     Constitutional: Pleasant and cooperative female, in NAD. Alert, interactive, non-toxic. Smiling and conversational.  HEENT: NC/AT, sclera clear, conjunctiva normal, OP with MMM, no discrete intraoral lesions  or thrush, poor dentition.  Respiratory: No increased work of breathing, no crackles or wheezing. Breath sounds mildly diminished in right lung base.  Cardiovascular: RRR, systolic murmur. No peripheral edema. No calf tenderness or palpable cords.  GI: Normal bowel sounds. Abdomen with central obesity, is soft, non-distended and non-tender to palpation.  Skin: Warm, dry, well-perfused. No bruising, bleeding, rashes, or lesions on limited exam. BMBx site bandaged.   Musculoskeletal: Diminished muscle bulk, no gross deformities.  Neurologic: A&O. Answers questions appropriately, speech normal. Moves all extremities spontaneously.  Psychiatric: Normal mood and affect.  Vascular access:  PICC on RUE CDI, non-tender, no surrounding erythema.    Medications   Current Facility-Administered Medications   Medication Dose Route Frequency Provider Last Rate Last Admin     Current Facility-Administered Medications   Medication Dose Route Frequency Provider Last Rate Last Admin    acyclovir (ZOVIRAX) tablet 400 mg  400 mg Oral BID Chelsie Murphy PA-C   400 mg at 03/23/25 1047    ceFEPIme (MAXIPIME) 2 g vial to attach to  mL bag for ADULTS or NS 50 mL bag for PEDS  2 g Intravenous Q8H Nery Crowell PA-C 200 mL/hr at 03/22/25 2050 2 g at 03/23/25 0440    famotidine (PEPCID) tablet 20 mg  20 mg Oral BID Zully Garcia PA-C   20 mg at 03/23/25 1047    fluticasone-vilanterol (BREO ELLIPTA) 100-25 MCG/ACT inhaler 1 puff  1 puff Inhalation Daily Chelsie Murphy PA-C   1 puff at 03/23/25 1046    heparin lock flush 10 unit/mL injection 5-20 mL  5-20 mL Intracatheter Q24H Chelsie Murphy PA-C   5 mL at 03/23/25 0532    [Held by provider] levofloxacin (LEVAQUIN) tablet 500 mg  500 mg Oral Daily Zully Garcia PA-C   500 mg at 03/18/25 0916    [Held by provider] lisinopril (ZESTRIL) tablet 10 mg  10 mg Oral Daily Jyoti Tenorio PA-C   10 mg at 03/10/25 1039    loratadine  (CLARITIN) tablet 10 mg  10 mg Oral Daily Chelsie Murphy PA-C   10 mg at 03/23/25 1046    pantoprazole (PROTONIX) EC tablet 40 mg  40 mg Oral BID  Nery Crowell PA-C   40 mg at 03/23/25 1045    PARoxetine (PAXIL) tablet 20 mg  20 mg Oral [facility] Chelsie Murphy PA-C   20 mg at 03/23/25 1046    posaconazole (NOXAFIL) DR tablet TBEC 300 mg  300 mg Oral QANery Bliss PA-C   300 mg at 03/23/25 1046    sodium chloride (PF) 0.9% PF flush 10-40 mL  10-40 mL Intracatheter Q8H Chelsie Murphy PA-C   20 mL at 03/23/25 0533    sucralfate (CARAFATE) suspension 1 g  1 g Oral 4x Daily AC & HS Aman Cervantes MD   1 g at 03/23/25 1046    Vitamin D3 (CHOLECALCIFEROL) tablet 1,000 Units  1,000 Units Oral Daily Chelsie Murphy PA-C   1,000 Units at 03/23/25 1046     Antiinfectives  Anti-infectives (From now, onward)      Start     Dose/Rate Route Frequency Ordered Stop    03/21/25 1200  ceFEPIme (MAXIPIME) 2 g vial to attach to  mL bag for ADULTS or NS 50 mL bag for PEDS         2 g  over 30 Minutes Intravenous EVERY 8 HOURS 03/21/25 1051 03/25/25 1200    03/18/25 0800  posaconazole (NOXAFIL) DR tablet TBEC 300 mg         300 mg Oral EVERY MORNING 03/17/25 1242      03/11/25 0800  [Held by provider]  levofloxacin (LEVAQUIN) tablet 500 mg        (On hold since Tue 3/18/2025 at 2010 until manually unheld; held by Fer Cole MDHold Reason: OtherHold Comments: Hold while on cefepime)    500 mg Oral DAILY 03/04/25 1102      02/26/25 2000  acyclovir (ZOVIRAX) tablet 400 mg         400 mg Oral 2 TIMES DAILY 02/26/25 1307            Data   CBC   Recent Labs   Lab 03/23/25  0532 03/22/25  0600 03/21/25  0307 03/20/25  0212   WBC 0.6* 0.7* 0.6* 0.8*   RBC 2.52* 2.46* 2.07* 2.37*   HGB 8.2* 7.9* 6.8* 7.6*   HCT 22.6* 22.1* 18.8* 21.5*   MCV 90 90 91 91   MCH 32.5 32.1 32.9 32.1   MCHC 36.3 35.7 36.2 35.3   RDW 15.1* 15.4* 15.9* 16.3*   PLT 31* 28*  40* 10*     CMP   Recent Labs   Lab 03/23/25  0532 03/22/25  0600 03/21/25  0307 03/20/25  0212    141 138 139   POTASSIUM 3.6 3.6 3.7 3.9   CHLORIDE 106 107 105 108*   CO2 21* 24 22 22   ANIONGAP 12 10 11 9   * 110* 113* 112*   BUN 13.2 12.7 9.2 9.6   CR 0.55 0.57 0.56 0.63   GFRESTIMATED >90 >90 >90 >90   TOBIN 8.8 8.8 8.6* 8.8   MAG 2.0 2.0 1.9 2.0   PHOS 3.3 3.5 3.1 3.3   PROTTOTAL 6.0* 6.0* 6.0* 6.2*   ALBUMIN 3.2* 3.3* 3.3* 3.3*   BILITOTAL 0.3 0.4 0.3 0.4   ALKPHOS 111 105 101 102   AST 11 10 10 9   ALT 9 9 9 7     LFTs   Recent Labs   Lab 03/23/25  0532   PROTTOTAL 6.0*   ALBUMIN 3.2*   BILITOTAL 0.3   ALKPHOS 111   AST 11   ALT 9     Coagulation Studies   Recent Labs   Lab 03/21/25  0307   INR 1.08   PTT 28

## 2025-03-24 LAB
ALBUMIN SERPL BCG-MCNC: 3.2 G/DL (ref 3.5–5.2)
ALP SERPL-CCNC: 107 U/L (ref 40–150)
ALT SERPL W P-5'-P-CCNC: 8 U/L (ref 0–50)
ANION GAP SERPL CALCULATED.3IONS-SCNC: 9 MMOL/L (ref 7–15)
APTT PPP: 32 SECONDS (ref 22–38)
AST SERPL W P-5'-P-CCNC: 9 U/L (ref 0–45)
BACTERIA BLD CULT: NO GROWTH
BACTERIA BLD CULT: NO GROWTH
BASOPHILS # BLD AUTO: 0 10E3/UL (ref 0–0.2)
BASOPHILS NFR BLD AUTO: 0 %
BILIRUB SERPL-MCNC: 0.3 MG/DL
BUN SERPL-MCNC: 15.1 MG/DL (ref 6–20)
CALCIUM SERPL-MCNC: 8.5 MG/DL (ref 8.8–10.4)
CHLORIDE SERPL-SCNC: 106 MMOL/L (ref 98–107)
CREAT SERPL-MCNC: 0.57 MG/DL (ref 0.51–0.95)
EGFRCR SERPLBLD CKD-EPI 2021: >90 ML/MIN/1.73M2
EOSINOPHIL # BLD AUTO: 0 10E3/UL (ref 0–0.7)
EOSINOPHIL NFR BLD AUTO: 0 %
ERYTHROCYTE [DISTWIDTH] IN BLOOD BY AUTOMATED COUNT: 14.6 % (ref 10–15)
FIBRINOGEN PPP-MCNC: 504 MG/DL (ref 170–510)
GLUCOSE SERPL-MCNC: 112 MG/DL (ref 70–99)
HCO3 SERPL-SCNC: 24 MMOL/L (ref 22–29)
HCT VFR BLD AUTO: 19.8 % (ref 35–47)
HGB BLD-MCNC: 7.1 G/DL (ref 11.7–15.7)
IMM GRANULOCYTES # BLD: 0 10E3/UL
IMM GRANULOCYTES NFR BLD: 0 %
INR PPP: 1.13 (ref 0.85–1.15)
LDH SERPL L TO P-CCNC: 158 U/L (ref 0–250)
LYMPHOCYTES # BLD AUTO: 0.6 10E3/UL (ref 0.8–5.3)
LYMPHOCYTES NFR BLD AUTO: 87 %
MAGNESIUM SERPL-MCNC: 2 MG/DL (ref 1.7–2.3)
MCH RBC QN AUTO: 32.7 PG (ref 26.5–33)
MCHC RBC AUTO-ENTMCNC: 35.9 G/DL (ref 31.5–36.5)
MCV RBC AUTO: 91 FL (ref 78–100)
MONOCYTES # BLD AUTO: 0 10E3/UL (ref 0–1.3)
MONOCYTES NFR BLD AUTO: 5 %
NEUTROPHILS # BLD AUTO: 0.1 10E3/UL (ref 1.6–8.3)
NEUTROPHILS NFR BLD AUTO: 9 %
NRBC # BLD AUTO: 0 10E3/UL
NRBC BLD AUTO-RTO: 0 /100
PHOSPHATE SERPL-MCNC: 3.3 MG/DL (ref 2.5–4.5)
PLAT MORPH BLD: NORMAL
PLATELET # BLD AUTO: 43 10E3/UL (ref 150–450)
POTASSIUM SERPL-SCNC: 3.7 MMOL/L (ref 3.4–5.3)
PROT SERPL-MCNC: 5.8 G/DL (ref 6.4–8.3)
RBC # BLD AUTO: 2.17 10E6/UL (ref 3.8–5.2)
RBC MORPH BLD: NORMAL
SODIUM SERPL-SCNC: 139 MMOL/L (ref 135–145)
URATE SERPL-MCNC: 2 MG/DL (ref 2.4–5.7)
WBC # BLD AUTO: 0.7 10E3/UL (ref 4–11)

## 2025-03-24 PROCEDURE — 250N000011 HC RX IP 250 OP 636

## 2025-03-24 PROCEDURE — 80053 COMPREHEN METABOLIC PANEL: CPT

## 2025-03-24 PROCEDURE — 85610 PROTHROMBIN TIME: CPT | Performed by: PHYSICIAN ASSISTANT

## 2025-03-24 PROCEDURE — 83615 LACTATE (LD) (LDH) ENZYME: CPT

## 2025-03-24 PROCEDURE — 84100 ASSAY OF PHOSPHORUS: CPT | Performed by: PHYSICIAN ASSISTANT

## 2025-03-24 PROCEDURE — 250N000013 HC RX MED GY IP 250 OP 250 PS 637

## 2025-03-24 PROCEDURE — 85730 THROMBOPLASTIN TIME PARTIAL: CPT | Performed by: PHYSICIAN ASSISTANT

## 2025-03-24 PROCEDURE — 83735 ASSAY OF MAGNESIUM: CPT

## 2025-03-24 PROCEDURE — 85025 COMPLETE CBC W/AUTO DIFF WBC: CPT

## 2025-03-24 PROCEDURE — 85384 FIBRINOGEN ACTIVITY: CPT | Performed by: PHYSICIAN ASSISTANT

## 2025-03-24 PROCEDURE — 120N000002 HC R&B MED SURG/OB UMMC

## 2025-03-24 PROCEDURE — 99233 SBSQ HOSP IP/OBS HIGH 50: CPT | Mod: FS | Performed by: PHYSICIAN ASSISTANT

## 2025-03-24 PROCEDURE — 84550 ASSAY OF BLOOD/URIC ACID: CPT | Performed by: PHYSICIAN ASSISTANT

## 2025-03-24 PROCEDURE — 250N000013 HC RX MED GY IP 250 OP 250 PS 637: Performed by: HOSPITALIST

## 2025-03-24 RX ORDER — LEVOFLOXACIN 500 MG/1
500 TABLET, FILM COATED ORAL DAILY
Status: DISCONTINUED | OUTPATIENT
Start: 2025-03-25 | End: 2025-03-31

## 2025-03-24 RX ADMIN — FAMOTIDINE 20 MG: 20 TABLET, FILM COATED ORAL at 08:00

## 2025-03-24 RX ADMIN — PANTOPRAZOLE SODIUM 40 MG: 40 TABLET, DELAYED RELEASE ORAL at 07:59

## 2025-03-24 RX ADMIN — SUCRALFATE ORAL 1 G: 1 SUSPENSION ORAL at 21:35

## 2025-03-24 RX ADMIN — Medication 1000 UNITS: at 08:00

## 2025-03-24 RX ADMIN — SUCRALFATE ORAL 1 G: 1 SUSPENSION ORAL at 15:48

## 2025-03-24 RX ADMIN — HEPARIN, PORCINE (PF) 10 UNIT/ML INTRAVENOUS SYRINGE 5 ML: at 13:41

## 2025-03-24 RX ADMIN — HYDROXYZINE HYDROCHLORIDE 25 MG: 25 TABLET, FILM COATED ORAL at 21:35

## 2025-03-24 RX ADMIN — LORATADINE 10 MG: 10 TABLET ORAL at 08:00

## 2025-03-24 RX ADMIN — HYDROXYZINE HYDROCHLORIDE 25 MG: 25 TABLET, FILM COATED ORAL at 15:36

## 2025-03-24 RX ADMIN — PANTOPRAZOLE SODIUM 40 MG: 40 TABLET, DELAYED RELEASE ORAL at 15:48

## 2025-03-24 RX ADMIN — ACYCLOVIR 400 MG: 400 TABLET ORAL at 07:59

## 2025-03-24 RX ADMIN — ACETAMINOPHEN 650 MG: 325 TABLET, FILM COATED ORAL at 08:00

## 2025-03-24 RX ADMIN — ACYCLOVIR 400 MG: 400 TABLET ORAL at 20:50

## 2025-03-24 RX ADMIN — SUCRALFATE ORAL 1 G: 1 SUSPENSION ORAL at 07:59

## 2025-03-24 RX ADMIN — DIPHENHYDRAMINE HYDROCHLORIDE AND LIDOCAINE HYDROCHLORIDE AND ALUMINUM HYDROXIDE AND MAGNESIUM HYDRO 10 ML: KIT at 15:35

## 2025-03-24 RX ADMIN — HEPARIN, PORCINE (PF) 10 UNIT/ML INTRAVENOUS SYRINGE 5 ML: at 21:36

## 2025-03-24 RX ADMIN — Medication 5 ML: at 05:00

## 2025-03-24 RX ADMIN — CEFEPIME 2 G: 2 INJECTION, POWDER, FOR SOLUTION INTRAVENOUS at 12:52

## 2025-03-24 RX ADMIN — SUCRALFATE ORAL 1 G: 1 SUSPENSION ORAL at 12:51

## 2025-03-24 RX ADMIN — PAROXETINE HYDROCHLORIDE 20 MG: 20 TABLET, FILM COATED ORAL at 08:00

## 2025-03-24 RX ADMIN — CEFEPIME 2 G: 2 INJECTION, POWDER, FOR SOLUTION INTRAVENOUS at 04:01

## 2025-03-24 RX ADMIN — CEFEPIME 2 G: 2 INJECTION, POWDER, FOR SOLUTION INTRAVENOUS at 20:50

## 2025-03-24 RX ADMIN — FAMOTIDINE 20 MG: 20 TABLET, FILM COATED ORAL at 20:50

## 2025-03-24 RX ADMIN — POSACONAZOLE 300 MG: 100 TABLET, DELAYED RELEASE ORAL at 08:00

## 2025-03-24 RX ADMIN — FLUTICASONE FUROATE AND VILANTEROL TRIFENATATE 1 PUFF: 100; 25 POWDER RESPIRATORY (INHALATION) at 07:58

## 2025-03-24 ASSESSMENT — ACTIVITIES OF DAILY LIVING (ADL)
ADLS_ACUITY_SCORE: 33
ADLS_ACUITY_SCORE: 33
ADLS_ACUITY_SCORE: 34
ADLS_ACUITY_SCORE: 33
ADLS_ACUITY_SCORE: 36
ADLS_ACUITY_SCORE: 36
ADLS_ACUITY_SCORE: 33
ADLS_ACUITY_SCORE: 33
ADLS_ACUITY_SCORE: 36
ADLS_ACUITY_SCORE: 33
ADLS_ACUITY_SCORE: 33
ADLS_ACUITY_SCORE: 36
ADLS_ACUITY_SCORE: 33
ADLS_ACUITY_SCORE: 36
ADLS_ACUITY_SCORE: 33

## 2025-03-24 NOTE — PLAN OF CARE
Goal Outcome Evaluation:      Plan of Care Reviewed With: patient    Overall Patient Progress: no change    Outcome Evaluation: D19 7+3    A/Ox4. VSS on RA. Epigastric pain increased this afternoon and managed with GI cocktail x1 and scheduled medications. Pt coughed up flem x1 this afternoon after attempting to eat cheese pizza. PRN atarax given for anxiety and PRN flexeril given for muscle spasm. Denies nausea, SOB, and dizziness. UAL, frequent walks. Regular diet with fair appetite. LBM 3/23 and is passing gas. Voiding spontaneously but not saving. PICC is heparin locked on both lumens. Chemo complete and awaiting count recovery. Continue with POC.

## 2025-03-24 NOTE — PROGRESS NOTES
[facility]    Progress Note  Hematology / Oncology     Date of Admission:  2/26/2025  Hospital Day #: 26   Date of Service (when I saw the patient): 03/24/2025    Assessment & Plan   Alejandra Villegas is a 59 year old female with a past medical history significant for COPD, migraines, rheumatoid arthritis, aortic stenosis, Afib, and anxiety who presented to an outside hospital with cytopenias and was found on BMBx to have hypercellular marrow with 4% blasts, 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 She was subsequently admitted to [facility] on 2/26/25 for further work-up and management and pathology re-review was most consistent with AML with NPM1 mutation by WHO 2022 (which does not require a specific blast threshold). Following further multidisciplinary discussion, she started intensive induction with 7+3 (D1=3/5/25). Her course has been complicated by neutropenic fevers, influenza A, AOM, and L TM perforation.    TODAY:  - D20 7+3 induction.  - Complete empiric course of IV cefepime through 3/25 at 1200, then resume ppx levofloxacin.  - Will tentatively plan for restaging BMBx and possible discharge to home thereafter as soon as early week of 3/31, pending count recovery and ongoing clinical stability.    HEME  # AML with NPM1 mutation  Had been seen by PCP Dec 2024 w/ progressive fatigue and nausea where she was found leukopenic WBC  2.4 and neutropenic w/ ANC 0.3. Hgb 12. Was referred to local hematology/oncology clinic for further evaluation and seen by Dr. Samina Harris. BMBx 2/10/25 done at [facility] showed hypercellular marrow w/ trilineage hematopoiesis w/ dyspoiesis with mildly elevated blasts of 4% on morphology. NGS w/ NPM1, IDH2, and NRAS mutations. She was admitted to [facility] 2/26/25 for further workup and management. Slides were re-reviewed by [facility] Hematopathology, who noted hypercellular marrow with 8% blasts by morphology. Despite relatively  low blast percentage, findings were felt most consistent with a diagnosis AML with NPM1 mutation by WHO 2022 (which does not require a specific blast threshold). Following interdepartmental discussions and review of the available literature, patient was started on intensive induction with 7+3 (Day 1=3/5/25).   - PICC placed 3/5/2025, plan to discontinue on discharge.   - Baseline cardiopulmonary studies:  - Echo w/ EF 60-65%, mild known aortic stenosis.   - EKG (2/27) with NSR, QTc 412  - CXR (2/26) with mildly increased streaky bibasilar opacities, atelectasis or edema. No consolidation.   - Baseline viral serologies: CMV IgG+, EBV IgG+, HepB sAg-, HepB cAb-, HepB sAb-, HSV1+/2+, HIV-  - HLA Typing sent on admission. Message sent to notify BMT team x2/27 for IP consult.   - Midcycle BMBx 3/19/25: Flow with no increase in myeloid blasts and no abnormal myeloid blast population. Morphology with no morphologic or immunophenotypic evidence of acute myeloid leukemia.                 Treatment plan: 7+3 (C1D1=3/5/2025)  - Daunorubicin 60 mg/m  IV D1-3  - Cytarabine 100 mg/m  IV D1-7    Supportive medications: zofran, dexamethasone 12 mg D1-3     # Pancytopenia  Secondary to underlying hematologic malignancy and exacerbated by recent chemotherapy.  - Follow daily CBC with differential  - Transfuse to keep Hgb >7, plt >10K  - Blood consent obtained 2/26/25 on admission and placed in patient's paper chart.      ID  # Neutropenic fever, recurrent  # Influenza A, resolved  Episode #1 of neutropenic fever:  2/25: On admission, patient noted subjective fever with chills and rhinitis beginning 2/25 PM prior to admission. No other localizing s/sx of infection. She was afebrile on admission but spiked a low-grade fever to 100.7 on 2/26 PM. Blood and urine cultures negative, CXR with streaky opacities but no burt consolidation. Work-up positive for influenza A, and she completed a course of Tamiflu 75 mg BID x5 days (2/26-3/3).  She also received a short empiric course of IV cefepime (2/26-3/1) given concurrent neutropenia.  Episode #2:  3/13: Febrile 100.4 overnight x3/13. No localizing/new symptoms. OVSS. Patient was wearing heated jacket that she attributed to temperature, no documentation noted of notifying cross cover MD. No further ID workup (BCx, UA, UC, or CXR) or broadening of abx completed. Given prolonged period on AM chart check x3/14 since documented fever and patient has remained afebrile and OVSS, will defer further workup/initiation of abx.   Episode #3:  3/19: Tmax 100.6 overnight. Asymptomatic, although notes sweating upon waking the following morning. Started IV Cefepime 2 g q8h (3/18-X). CXR with scattered patchy densities, especially in the lung bases. Respiratory panel negative. UA with protein and blood, no sign of infection. UCx and BCx NGTD. Of note, taking APAP daily for headaches which may be suppressing fevers.   - Will plan to complete 7 day course, Cefepime 2 g q8h (3/18-3/25), end date ordered.   - Continue to monitor for fever recurrence.    # ID prophylaxis  - Acyclovir 400 mg BID  - Levaquin 500 mg daily - on hold with cefepime as above, resume x3/25  - Posaconazole 300 mg daily   - Vori w/ $1.60 copay, posa requiring PA also w/ $1.60 copay. Rotated from deny to vori (3/13-3/17) w/ completion of chemotherapy, then transitioned to posa x3/18 given visual hallucinations x3/17     GI/  # GERD  # Epigastric pain  Reported recent increase in symptoms in the days leading up to admission and initiated famotidine. Persistent/progressive symptoms throughout admission. Cardiac work-up reassuring, lipase negative. Increased H2B to BID (3/9); given persistent symptoms, PPI started (3/14). Increased to BID (3/19). CT C/A/P with evidence of esophagitis (thought due to reflux/recent chemotherapy) and small hiatal hernia. Symptoms have improved on maximal medical therapy as below.  - Continue famotidine 20 mg BID  -  Continue Protonix 40 mg BID  - GI cocktail PRN  - TUMS PRN  - GI consulted, EGD deferred for now as risks thought to outweigh the benefits in the setting of neutropenia; can reassess upon count recovery  - Continue to encourage lifestyle modifications: avoid straws, carbonated beverages, GERD precautions    # Risk of malnutrition  Secondary to esophagitis/GERD/mucositis picture as above, patient intake has decreased to small servings, mostly eating soup. On regular adult diet with thin liquids per SLP. Recommended trial of high caloric liquids, patient tried Strawberry Ensure and plans to continue to order with meals.  - RD following; appreciate recs     # Nausea/vomiting, improving  Patient noted ongoing nausea w/ occasional vomiting starting Dec 2024. Has been managed with PRN Zofran.  Endorsed nausea on admission, now resolved.   - PRN Zofran and compazine  - Could consider trial of PRN Zyprexa if 3rd agent is desired/required     PULM  # COPD  Longstanding history of COPD. On admission patient reports symptoms are manageable and no recent exacerbations. Most recent PFTs (2018) were normal.  - Continue PTA Breo Ellipta inhaler and PRN DuoNebs      CV  # Essential hypertension  - Continue PTA lisinopril - HELD 3/11 w/ soft BPs (90s-100s/50s-60s), resume pending daily trends    RENAL/FEN  # Stage 2 CKD  Baseline Cr ~ 0.7. On admission Cr 0.77.  - Continue to trend on daily labs and encourage PO hydration      NEURO  # Chronic migraine w/o aura  # Chronic headaches  Present since childhood. Reportedly triggered by neck manipulation/movement. Reportedly well-managed with regimen below. Headaches occurring throughout admission with daily APAP use, also chronic and managed with APAP PRN at home. Patient denies acute worsening of headache or neck pain, vision changes, numbness/tingling of extremities.  - APAP PRN  - Continue PTA sumatriptan and cyclobenzaprine PRN  - Continue to monitor      MISC  # BROOKS  # MDD  # At  risk for adjustment disorder  Patient reports good control of anxiety/depressive symptoms prior to admission. Did note feeling overwhelmed by new diagnosis. Offered health psychology or cordelia, which she declined.  - Readdress health psych consult as indicated  - Continue PTA paroxetine   - Atarax as needed     # Tobacco use   Has smoked since age 16, 1.5 ppd (roughly 65 pack years). Attempting to cut back as recently as 01/2025 to 0.5 ppd. We discussed the risks of smoking during induction chemotherapy including increased risk for infection in setting of severe neutropenia that could further complicate course, even leading to death.   - Encourage smoking cessation; patient continues to smoke at this time, despite understanding/acknowledgement of the risks.  - Trialed nicotine patch, then self-discontinued after reporting that she felt the nicotine patch precipitated an anxiety attack/episode of chest pain on 3/9. Offered a lower dose versus alternative form of NRT, which patient declined.      Chronic Problems:  # Vitamin D deficiency - Continue PTA vit D supplement  # Seasonal allergies - Claritin 10 mg daily  # Rheumatoid arthritis - Patient reported trying treatment though was unable to tolerate well. Managed with Tylenol PRN. Most recent RF >650 (2/10/25). JI negative.    # Prediabetes - Last A1c 6.0 x12/9/24. Has been managing with lifestyle modifications.   # Degenerative disc disease, L5-S1 - Continue PTA gabapentin PRN  # Mixed hyperlipidemia - Lipid panel has remained at goal, 1/14/25 panel w/ cholesterol 163, , LDL 92, HDL 45.   - Held PTA atorvastatin on admission due to chemotherapy interactions. Consider rotation to rosuvastatin for better drug-drug interaction profile.   # H/o aortic stenosis - Patient noted on admission longstanding history of aortic stenosis. Pre-chemo echo w/ EF 60-65% and mild aortic stenosis and insufficiency. No previous echo to compare.     [facility]  "Problems:  # Mild hyponatremia, resolved - New mild hyponatremia noted 3/13 with Na 135 ? 130. Asymptomatic. Now resolved.  # Non-radiating chest pain, resolved - for details see note from 3/21 and prior   # Urinary frequency + Dysuria, resolved - On admission, patient noted longstanding history of urinary frequency though new mild dysuria. UA (2/26) negative, UC with no growth. Symptoms have since resolved.  # R AOM with purulent effusion and Small L TM perforation, resolved - ENT consulted, appreciate recs. Completed 7-day course Levaquin 750mg daily and 5 day course of floxin drops to left ear for perforation     Clinically Significant Risk Factors           # Hypocalcemia: Lowest Ca = 8.5 mg/dL in last 2 days, will monitor and replace as appropriate     # Hypoalbuminemia: Lowest albumin = 3.2 g/dL at 3/24/2025  4:59 AM, will monitor as appropriate   # Thrombocytopenia: Lowest platelets = 31 in last 2 days, will monitor for bleeding   # Hypertension: Noted on problem list            # Overweight: Estimated body mass index is 29.95 kg/m  as calculated from the following:    Height as of this encounter: 1.626 m (5' 4\").    Weight as of this encounter: 79.2 kg (174 lb 8 oz).        # Financial/Environmental Concerns: none  # Asthma: noted on problem list          Fluids/Electrolytes/Nutrition   - IVF bolus PRN   - PRN lyte replacement per standing protocol  - Regular diet as tolerated      Lines: PICC     PPX  VTE: TCP, Lovenox held 3/13  GI: Pepcid, Protonix  Bowels: prn senna and miralax  Activity: as tolerated      Code Status: DNR/DNI    Disposition: Inpatient admission to [facility]e Malignancy service for treatment of AML; discharge to home pending count recovery and restaging BMBx, likely 7-10 more days.   Follow-up: Will need to request APPT18 closer to discharge date and determine primary oncologist. Messaged Dr. Harris x3/21 for primary oncologist preference, as Alejandra would like to follow with [facility] if possible.    Medically Ready for Discharge: Anticipated in 5+ Days      Staffed with Dr. Moreno.    I spent 60 minutes in the care of this patient today, which included time necessary for review of interval events, obtaining history and physical exam, ordering medication(s)/test(s) as medically indicated, discussion with interdisciplinary/consult team(s), care coordination, and documentation time.     Chelsie Davila PA-C  Hematology/Oncology  Pager: #[phone]     Interval History   No acute overnight events. Nursing notes reviewed. Alejandra reports doing okay today. She is eager to go home, but otherwise has no specific complaints. GERD symptoms seem to be improved, no recent abdominal/chest pain, N/V, or other concerns. Eating okay, some foods go down better than others. Coloring a lot. Denies fevers or chills. Discussed duration of IV antibiotics and plan to change back to ppx tomorrow. Reviewed uncertainties around timing of discharge and discussed that ideally, patient would have some count recovery and restaging BMBx prior to discharge. All questions/concerns addressed at bedside.     A comprehensive review of symptoms was performed and was negative except as detailed in the interval history above.    Physical Exam   Vital Signs with Ranges  Temp:  [98  F (36.7  C)-98.8  F (37.1  C)] 98  F (36.7  C)  Pulse:  [77-85] 78  Resp:  [16-18] 18  BP: (104-141)/(65-86) 104/67  SpO2:  [93 %-97 %] 95 %    I/O last 3 completed shifts:  In: 1790 [P.O.:1680; I.V.:10; IV Piggyback:100]  Out: 1550 [Urine:1550]    Vitals:    03/22/25 0823 03/23/25 1018 03/24/25 0942   Weight: 78.5 kg (173 lb 1.6 oz) 78.4 kg (172 lb 12.8 oz) 79.2 kg (174 lb 8 oz)     Constitutional: Pleasant and cooperative female, in NAD. Alert, interactive, non-toxic. Smiling and conversational.  HEENT: NC/AT, sclera clear, conjunctiva normal, OP with MMM, no discrete intraoral lesions or thrush, poor dentition.  Respiratory: No  increased work of breathing, no crackles or wheezing. Breath sounds mildly diminished to the lung bases bilaterally.  Cardiovascular: RRR, systolic murmur. No peripheral edema. No calf tenderness or palpable cords.  GI: Normal bowel sounds. Abdomen with central obesity, is soft, non-distended and non-tender to palpation.  Skin: Warm, dry, well-perfused. No bruising, bleeding, rashes, or lesions on limited exam.  Musculoskeletal: Diminished muscle bulk, no gross deformities.  Neurologic: A&O. Answers questions appropriately, speech normal. Moves all extremities spontaneously.  Psychiatric: Normal mood and affect.  Vascular access:  PICC on RUE CDI, non-tender, no surrounding erythema.    Medications   Current Facility-Administered Medications   Medication Dose Route Frequency Provider Last Rate Last Admin     Current Facility-Administered Medications   Medication Dose Route Frequency Provider Last Rate Last Admin    acyclovir (ZOVIRAX) tablet 400 mg  400 mg Oral BID Chelsie Murphy PA-C   400 mg at 03/24/25 0759    ceFEPIme (MAXIPIME) 2 g vial to attach to  mL bag for ADULTS or NS 50 mL bag for PEDS  2 g Intravenous Q8H Nery Crowell PA-C 200 mL/hr at 03/23/25 1349 2 g at 03/24/25 1252    famotidine (PEPCID) tablet 20 mg  20 mg Oral BID Zully Garcia PA-C   20 mg at 03/24/25 0800    fluticasone-vilanterol (BREO ELLIPTA) 100-25 MCG/ACT inhaler 1 puff  1 puff Inhalation Daily Chelsie Murphy PA-C   1 puff at 03/24/25 0758    heparin lock flush 10 unit/mL injection 5-20 mL  5-20 mL Intracatheter Q24H Chelsie Murphy PA-C   5 mL at 03/24/25 0500    [START ON 3/25/2025] levofloxacin (LEVAQUIN) tablet 500 mg  500 mg Oral Daily Chelsie Davila PA-C        [Held by provider] lisinopril (ZESTRIL) tablet 10 mg  10 mg Oral Daily Jyoti Tenorio PA-C   10 mg at 03/10/25 1039    loratadine (CLARITIN) tablet 10 mg  10 mg Oral Daily Chelsie Murphy PA-C    10 mg at 03/24/25 0800    pantoprazole (PROTONIX) EC tablet 40 mg  40 mg Oral BID  Nery Crowell PA-C   40 mg at 03/24/25 0759    PARoxetine (PAXIL) tablet 20 mg  20 mg Oral Chelsie Lowery PA-C   20 mg at 03/24/25 0800    posaconazole (NOXAFIL) DR tablet TBEC 300 mg  300 mg Oral Nery Dubose PA-C   300 mg at 03/24/25 0800    sodium chloride (PF) 0.9% PF flush 10-40 mL  10-40 mL Intracatheter Q8H Chelsie Murphy PA-C   20 mL at 03/24/25 0500    sucralfate (CARAFATE) suspension 1 g  1 g Oral 4x Daily AC & HS Aman Cervantes MD   1 g at 03/24/25 1251    Vitamin D3 (CHOLECALCIFEROL) tablet 1,000 Units  1,000 Units Oral Daily Chelsie Murphy PA-C   1,000 Units at 03/24/25 0800     Antiinfectives  Anti-infectives (From now, onward)      Start     Dose/Rate Route Frequency Ordered Stop    03/25/25 2000  levofloxacin (LEVAQUIN) tablet 500 mg         500 mg Oral DAILY 03/24/25 0819      03/21/25 1200  ceFEPIme (MAXIPIME) 2 g vial to attach to  mL bag for ADULTS or NS 50 mL bag for PEDS         2 g  over 30 Minutes Intravenous EVERY 8 HOURS 03/21/25 1051 03/25/25 1200    03/18/25 0800  posaconazole (NOXAFIL) DR tablet TBEC 300 mg         300 mg Oral EVERY MORNING 03/17/25 1242      02/26/25 2000  acyclovir (ZOVIRAX) tablet 400 mg         400 mg Oral 2 TIMES DAILY 02/26/25 1307            Data   CBC   Recent Labs   Lab 03/24/25  0459 03/23/25  0532 03/22/25  0600 03/21/25  0307   WBC 0.7* 0.6* 0.7* 0.6*   RBC 2.17* 2.52* 2.46* 2.07*   HGB 7.1* 8.2* 7.9* 6.8*   HCT 19.8* 22.6* 22.1* 18.8*   MCV 91 90 90 91   MCH 32.7 32.5 32.1 32.9   MCHC 35.9 36.3 35.7 36.2   RDW 14.6 15.1* 15.4* 15.9*   PLT 43* 31* 28* 40*     CMP   Recent Labs   Lab 03/24/25  0459 03/23/25  0532 03/22/25  0600 03/21/25  0307    139 141 138   POTASSIUM 3.7 3.6 3.6 3.7   CHLORIDE 106 106 107 105   CO2 24 21* 24 22   ANIONGAP 9 12 10 11   * 111* 110* 113*   BUN 15.1  13.2 12.7 9.2   CR 0.57 0.55 0.57 0.56   GFRESTIMATED >90 >90 >90 >90   TOBIN 8.5* 8.8 8.8 8.6*   MAG 2.0 2.0 2.0 1.9   PHOS 3.3 3.3 3.5 3.1   PROTTOTAL 5.8* 6.0* 6.0* 6.0*   ALBUMIN 3.2* 3.2* 3.3* 3.3*   BILITOTAL 0.3 0.3 0.4 0.3   ALKPHOS 107 111 105 101   AST 9 11 10 10   ALT 8 9 9 9     LFTs   Recent Labs   Lab 03/24/25  0459   PROTTOTAL 5.8*   ALBUMIN 3.2*   BILITOTAL 0.3   ALKPHOS 107   AST 9   ALT 8     Coagulation Studies   Recent Labs   Lab 03/24/25  0459   INR 1.13   PTT 32

## 2025-03-24 NOTE — PLAN OF CARE
Goal Outcome Evaluation:       Plan of Care Reviewed With: patient     Overall Patient Progress: no change     Outcome Evaluation: AML.  Intensive induction with 7+3 (D1=3/5/25)        Neutropenic Precautions.  A&Ox4.  AVSS.  93% RA.    Pt denies HA overnight.    Denies nausea.  (Previously - epigastric pain/discomfort).  UAL.  Outside to smoke independently.    Voiding good vols.  R PICC - HL after meds, labs.  Cont with POC.

## 2025-03-24 NOTE — PLAN OF CARE
"Goal Outcome Evaluation:      Plan of Care Reviewed With: patient    Overall Patient Progress: no changeOverall Patient Progress: no change    Outcome Evaluation: Cycle 1 Day 20 7+3    Patient feeling well today; no complaints of nausea. Patient usually eats breakfast around 9 or 10am. Tylenol x1 for mild headache first thing this morning. Patient ambulating halls & going outside frequently. No blood products or electrolyte replacements needed today. No complaints of epigastric pain. Patient looking forward to getting \"discharged next week.\"  "

## 2025-03-25 LAB
ABO + RH BLD: NORMAL
ALBUMIN SERPL BCG-MCNC: 3.3 G/DL (ref 3.5–5.2)
ALP SERPL-CCNC: 111 U/L (ref 40–150)
ALT SERPL W P-5'-P-CCNC: 9 U/L (ref 0–50)
ANION GAP SERPL CALCULATED.3IONS-SCNC: 11 MMOL/L (ref 7–15)
AST SERPL W P-5'-P-CCNC: 13 U/L (ref 0–45)
BASOPHILS # BLD AUTO: 0 10E3/UL (ref 0–0.2)
BASOPHILS NFR BLD AUTO: 0 %
BILIRUB SERPL-MCNC: 0.2 MG/DL
BLAIMP ISLT/SPM QL: NOT DETECTED
BLAKPC ISLT/SPM QL: NOT DETECTED
BLAOXA-48 ISLT/SPM QL: NOT DETECTED
BLAVIM ISLT/SPM QL: NOT DETECTED
BLD GP AB SCN SERPL QL: NEGATIVE
BLD PROD TYP BPU: NORMAL
BLOOD COMPONENT TYPE: NORMAL
BUN SERPL-MCNC: 14.2 MG/DL (ref 6–20)
CALCIUM SERPL-MCNC: 8.5 MG/DL (ref 8.8–10.4)
CHLORIDE SERPL-SCNC: 105 MMOL/L (ref 98–107)
CODING SYSTEM: NORMAL
CREAT SERPL-MCNC: 0.58 MG/DL (ref 0.51–0.95)
CROSSMATCH: NORMAL
EGFRCR SERPLBLD CKD-EPI 2021: >90 ML/MIN/1.73M2
ELLIPTOCYTES BLD QL SMEAR: SLIGHT
EOSINOPHIL # BLD AUTO: 0 10E3/UL (ref 0–0.7)
EOSINOPHIL NFR BLD AUTO: 0 %
ERYTHROCYTE [DISTWIDTH] IN BLOOD BY AUTOMATED COUNT: 14.4 % (ref 10–15)
GIANT PLATELETS BLD QL SMEAR: SLIGHT
GLUCOSE SERPL-MCNC: 114 MG/DL (ref 70–99)
HCO3 SERPL-SCNC: 24 MMOL/L (ref 22–29)
HCT VFR BLD AUTO: 18.7 % (ref 35–47)
HGB BLD-MCNC: 6.6 G/DL (ref 11.7–15.7)
IMM GRANULOCYTES # BLD: 0 10E3/UL
IMM GRANULOCYTES NFR BLD: 1 %
ISSUE DATE AND TIME: NORMAL
LDH SERPL L TO P-CCNC: 177 U/L (ref 0–250)
LYMPHOCYTES # BLD AUTO: 0.6 10E3/UL (ref 0.8–5.3)
LYMPHOCYTES NFR BLD AUTO: 75 %
MAGNESIUM SERPL-MCNC: 2 MG/DL (ref 1.7–2.3)
MCH RBC QN AUTO: 31.9 PG (ref 26.5–33)
MCHC RBC AUTO-ENTMCNC: 35.3 G/DL (ref 31.5–36.5)
MCV RBC AUTO: 90 FL (ref 78–100)
MONOCYTES # BLD AUTO: 0.1 10E3/UL (ref 0–1.3)
MONOCYTES NFR BLD AUTO: 7 %
NDM TARGET DNA: NOT DETECTED
NEUTROPHILS # BLD AUTO: 0.1 10E3/UL (ref 1.6–8.3)
NEUTROPHILS NFR BLD AUTO: 17 %
NRBC # BLD AUTO: 0 10E3/UL
NRBC BLD AUTO-RTO: 0 /100
PHOSPHATE SERPL-MCNC: 3.6 MG/DL (ref 2.5–4.5)
PLAT MORPH BLD: ABNORMAL
PLATELET # BLD AUTO: 65 10E3/UL (ref 150–450)
POTASSIUM SERPL-SCNC: 3.3 MMOL/L (ref 3.4–5.3)
POTASSIUM SERPL-SCNC: 3.8 MMOL/L (ref 3.4–5.3)
PROT SERPL-MCNC: 6 G/DL (ref 6.4–8.3)
RBC # BLD AUTO: 2.07 10E6/UL (ref 3.8–5.2)
RBC MORPH BLD: ABNORMAL
SODIUM SERPL-SCNC: 140 MMOL/L (ref 135–145)
SPECIMEN EXP DATE BLD: NORMAL
UNIT ABO/RH: NORMAL
UNIT NUMBER: NORMAL
UNIT STATUS: NORMAL
UNIT TYPE ISBT: 6200
URATE SERPL-MCNC: 2.2 MG/DL (ref 2.4–5.7)
WBC # BLD AUTO: 0.7 10E3/UL (ref 4–11)

## 2025-03-25 PROCEDURE — 85004 AUTOMATED DIFF WBC COUNT: CPT

## 2025-03-25 PROCEDURE — 83735 ASSAY OF MAGNESIUM: CPT

## 2025-03-25 PROCEDURE — 250N000013 HC RX MED GY IP 250 OP 250 PS 637: Performed by: PHYSICIAN ASSISTANT

## 2025-03-25 PROCEDURE — 120N000002 HC R&B MED SURG/OB UMMC

## 2025-03-25 PROCEDURE — 250N000013 HC RX MED GY IP 250 OP 250 PS 637

## 2025-03-25 PROCEDURE — 250N000013 HC RX MED GY IP 250 OP 250 PS 637: Performed by: HOSPITALIST

## 2025-03-25 PROCEDURE — 83615 LACTATE (LD) (LDH) ENZYME: CPT

## 2025-03-25 PROCEDURE — 250N000013 HC RX MED GY IP 250 OP 250 PS 637: Performed by: INTERNAL MEDICINE

## 2025-03-25 PROCEDURE — 80053 COMPREHEN METABOLIC PANEL: CPT

## 2025-03-25 PROCEDURE — 86900 BLOOD TYPING SEROLOGIC ABO: CPT

## 2025-03-25 PROCEDURE — P9016 RBC LEUKOCYTES REDUCED: HCPCS

## 2025-03-25 PROCEDURE — 250N000011 HC RX IP 250 OP 636

## 2025-03-25 PROCEDURE — 84550 ASSAY OF BLOOD/URIC ACID: CPT | Performed by: PHYSICIAN ASSISTANT

## 2025-03-25 PROCEDURE — 99233 SBSQ HOSP IP/OBS HIGH 50: CPT | Mod: FS | Performed by: PHYSICIAN ASSISTANT

## 2025-03-25 PROCEDURE — 87798 DETECT AGENT NOS DNA AMP: CPT | Performed by: INTERNAL MEDICINE

## 2025-03-25 PROCEDURE — 84100 ASSAY OF PHOSPHORUS: CPT | Performed by: PHYSICIAN ASSISTANT

## 2025-03-25 PROCEDURE — 84132 ASSAY OF SERUM POTASSIUM: CPT | Performed by: INTERNAL MEDICINE

## 2025-03-25 RX ORDER — POTASSIUM CHLORIDE 750 MG/1
40 TABLET, EXTENDED RELEASE ORAL ONCE
Status: COMPLETED | OUTPATIENT
Start: 2025-03-25 | End: 2025-03-25

## 2025-03-25 RX ORDER — TRIAMCINOLONE ACETONIDE 1 MG/G
OINTMENT TOPICAL 2 TIMES DAILY
Status: DISCONTINUED | OUTPATIENT
Start: 2025-03-25 | End: 2025-04-04

## 2025-03-25 RX ADMIN — FLUTICASONE FUROATE AND VILANTEROL TRIFENATATE 1 PUFF: 100; 25 POWDER RESPIRATORY (INHALATION) at 08:43

## 2025-03-25 RX ADMIN — PAROXETINE HYDROCHLORIDE 20 MG: 20 TABLET, FILM COATED ORAL at 08:40

## 2025-03-25 RX ADMIN — FAMOTIDINE 20 MG: 20 TABLET, FILM COATED ORAL at 20:07

## 2025-03-25 RX ADMIN — HEPARIN, PORCINE (PF) 10 UNIT/ML INTRAVENOUS SYRINGE 5 ML: at 19:08

## 2025-03-25 RX ADMIN — SUCRALFATE ORAL 1 G: 1 SUSPENSION ORAL at 15:57

## 2025-03-25 RX ADMIN — HEPARIN, PORCINE (PF) 10 UNIT/ML INTRAVENOUS SYRINGE 5 ML: at 08:29

## 2025-03-25 RX ADMIN — HYDROXYZINE HYDROCHLORIDE 25 MG: 25 TABLET, FILM COATED ORAL at 20:09

## 2025-03-25 RX ADMIN — Medication 1000 UNITS: at 08:40

## 2025-03-25 RX ADMIN — SUCRALFATE ORAL 1 G: 1 SUSPENSION ORAL at 21:39

## 2025-03-25 RX ADMIN — CEFEPIME 2 G: 2 INJECTION, POWDER, FOR SOLUTION INTRAVENOUS at 12:26

## 2025-03-25 RX ADMIN — LEVOFLOXACIN 500 MG: 500 TABLET, FILM COATED ORAL at 20:07

## 2025-03-25 RX ADMIN — PANTOPRAZOLE SODIUM 40 MG: 40 TABLET, DELAYED RELEASE ORAL at 08:40

## 2025-03-25 RX ADMIN — CEFEPIME 2 G: 2 INJECTION, POWDER, FOR SOLUTION INTRAVENOUS at 04:08

## 2025-03-25 RX ADMIN — TRIAMCINOLONE ACETONIDE: 1 OINTMENT TOPICAL at 12:39

## 2025-03-25 RX ADMIN — SUCRALFATE ORAL 1 G: 1 SUSPENSION ORAL at 08:39

## 2025-03-25 RX ADMIN — POTASSIUM CHLORIDE 40 MEQ: 750 TABLET, EXTENDED RELEASE ORAL at 08:40

## 2025-03-25 RX ADMIN — SUCRALFATE ORAL 1 G: 1 SUSPENSION ORAL at 12:39

## 2025-03-25 RX ADMIN — ACETAMINOPHEN 650 MG: 325 TABLET, FILM COATED ORAL at 02:39

## 2025-03-25 RX ADMIN — HYDROXYZINE HYDROCHLORIDE 25 MG: 25 TABLET, FILM COATED ORAL at 12:39

## 2025-03-25 RX ADMIN — PANTOPRAZOLE SODIUM 40 MG: 40 TABLET, DELAYED RELEASE ORAL at 15:57

## 2025-03-25 RX ADMIN — TRIAMCINOLONE ACETONIDE: 1 OINTMENT TOPICAL at 20:07

## 2025-03-25 RX ADMIN — HEPARIN, PORCINE (PF) 10 UNIT/ML INTRAVENOUS SYRINGE 5 ML: at 13:37

## 2025-03-25 RX ADMIN — LORATADINE 10 MG: 10 TABLET ORAL at 08:40

## 2025-03-25 RX ADMIN — FAMOTIDINE 20 MG: 20 TABLET, FILM COATED ORAL at 08:40

## 2025-03-25 RX ADMIN — POSACONAZOLE 300 MG: 100 TABLET, DELAYED RELEASE ORAL at 08:40

## 2025-03-25 RX ADMIN — ACYCLOVIR 400 MG: 400 TABLET ORAL at 20:07

## 2025-03-25 RX ADMIN — ACYCLOVIR 400 MG: 400 TABLET ORAL at 08:40

## 2025-03-25 ASSESSMENT — ACTIVITIES OF DAILY LIVING (ADL)
ADLS_ACUITY_SCORE: 36
ADLS_ACUITY_SCORE: 33
ADLS_ACUITY_SCORE: 36
ADLS_ACUITY_SCORE: 33
ADLS_ACUITY_SCORE: 36
ADLS_ACUITY_SCORE: 33
ADLS_ACUITY_SCORE: 36
ADLS_ACUITY_SCORE: 33
ADLS_ACUITY_SCORE: 36
ADLS_ACUITY_SCORE: 33
ADLS_ACUITY_SCORE: 36
ADLS_ACUITY_SCORE: 33
ADLS_ACUITY_SCORE: 36
ADLS_ACUITY_SCORE: 36

## 2025-03-25 NOTE — PROGRESS NOTES
[facility]    Progress Note  Hematology / Oncology     Date of Admission:  2/26/2025  Hospital Day #: 27   Date of Service (when I saw the patient): 03/25/2025    Assessment & Plan   Alejandra Villegas is a 59 year old female with a past medical history significant for COPD, migraines, rheumatoid arthritis, aortic stenosis, Afib, and anxiety who presented to an outside hospital with cytopenias and was found on BMBx to have hypercellular marrow with 4% blasts, 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 She was subsequently admitted to [facility] on 2/26/25 for further work-up and management and pathology re-review was most consistent with AML with NPM1 mutation by WHO 2022 (which does not require a specific blast threshold). Following further multidisciplinary discussion, she started intensive induction with 7+3 (D1=3/5/25). Her course has been complicated by neutropenic fevers, influenza A, AOM, and L TM perforation.    TODAY:  - D21 7+3 induction.   - Tentatively scheduled for a restaging BMBx on 4/1; can adjust PRN pending count recovery.  - Complete 7-day course of IV cefepime today; resume levofloxacin ppx.  - Start triamcinolone ointment to areas of rash on neck, shoulder, and knee; suspect likely contact dermatitis due to CHG wipes. Monitor clinically.    HEME  # AML with NPM1 mutation  Had been seen by PCP Dec 2024 w/ progressive fatigue and nausea where she was found leukopenic WBC  2.4 and neutropenic w/ ANC 0.3. Hgb 12. Was referred to local hematology/oncology clinic for further evaluation and seen by Dr. Samina Harris. BMBx 2/10/25 done at OSH showed hypercellular marrow w/ trilineage hematopoiesis w/ dyspoiesis with mildly elevated blasts of 4% on morphology. NGS w/ NPM1, IDH2, and NRAS mutations. She was admitted to [facility] 2/26/25 for further workup and management. Slides were re-reviewed by [facility] Hematopathology, who noted hypercellular marrow  with 8% blasts by morphology. Despite relatively low blast percentage, findings were felt most consistent with a diagnosis AML with NPM1 mutation by WHO 2022 (which does not require a specific blast threshold). Following interdepartmental discussions and review of the available literature, patient was started on intensive induction with 7+3 (Day 1=3/5/25).   - PICC placed 3/5/2025, plan to discontinue on discharge.   - Baseline cardiopulmonary studies:  - Echo w/ EF 60-65%, mild known aortic stenosis.   - EKG (2/27) with NSR, QTc 412  - CXR (2/26) with mildly increased streaky bibasilar opacities, atelectasis or edema. No consolidation.   - Baseline viral serologies: CMV IgG+, EBV IgG+, HepB sAg-, HepB cAb-, HepB sAb-, HSV1+/2+, HIV-  - HLA Typing sent on admission. Message sent to notify BMT team x2/27 for IP consult.   - Midcycle BMBx 3/19/25: Flow with no increase in myeloid blasts and no abnormal myeloid blast population. Morphology with no morphologic or immunophenotypic evidence of acute myeloid leukemia.                 Treatment plan: 7+3 (C1D1=3/5/2025)  - Daunorubicin 60 mg/m  IV D1-3  - Cytarabine 100 mg/m  IV D1-7    Supportive medications: Zofran, dexamethasone 12 mg D1-3     # Pancytopenia  Secondary to underlying hematologic malignancy and exacerbated by recent chemotherapy.  - Follow daily CBC with differential  - Transfuse to keep Hgb >7, plt >10K  - Blood consent obtained 2/26/25 on admission and placed in patient's paper chart.      ID  # Neutropenic fever, resolved  # Influenza A, resolved  Episode #1 of neutropenic fever:  2/25: On admission, patient noted subjective fever with chills and rhinitis beginning 2/25 PM prior to admission. No other localizing s/sx of infection. She was afebrile on admission but spiked a low-grade fever to 100.7 on 2/26 PM. Blood and urine cultures negative, CXR with streaky opacities but no burt consolidation. Work-up positive for influenza A, and she completed a  course of Tamiflu 75 mg BID x5 days (2/26-3/3). She also received a short empiric course of IV cefepime (2/26-3/1) given concurrent neutropenia.  Episode #2:  3/13: Febrile to 100.4 overnight x3/13. No localizing/new symptoms. OVSS. Patient was wearing heated jacket that she attributed to temperature, no documentation noted of notifying cross cover MD. No further ID workup (BCx, UA, UC, or CXR) completed and no antibiotic changes made. Given borderline temperature, decision made to continue monitoring and no further work-up was undertaken.  Episode #3:  3/19: Tmax 100.6 overnight. Asymptomatic, although notes sweating upon waking the following morning. Started IV Cefepime 2 g q8h (3/18-X). CXR with scattered patchy densities, especially in the lung bases. Respiratory panel negative. UA with protein and blood but otherwise bland, UCx negative. BCx NGTD. A CT C/A/P was done (for other reasons, as below) and notable for findings suggestive of esophagitis as well as possible bronchitis/small airways disease. She was treated with a 7-day course of IV cefepime (3/18-3/25) and fever did not recur.    # ID prophylaxis  - Acyclovir 400 mg BID  - Levaquin 500 mg daily - on hold with cefepime as above, resume x3/25  - Posaconazole 300 mg daily   - Vori w/ $1.60 copay, posa requiring PA also w/ $1.60 copay. Rotated from deny to vori (3/13-3/17) w/ completion of chemotherapy, then transitioned to posa x3/18 given visual hallucinations x3/17     GI  # GERD, improved  # Epigastric pain, resolved  Reported recent increase in symptoms in the days leading up to admission and initiated famotidine. Persistent/progressive symptoms throughout admission. Cardiac work-up reassuring, lipase negative. Increased H2B to BID (3/9); given persistent symptoms, PPI started (3/14). Increased to BID (3/19). CT C/A/P with evidence of esophagitis (thought due to reflux/recent chemotherapy) and small hiatal hernia. Symptoms have improved on maximal  medical therapy as below.  - Continue famotidine 20 mg BID  - Continue Protonix 40 mg BID  - GI cocktail PRN  - TUMS PRN  - GI consulted, EGD deferred for now as risks thought to outweigh the benefits in the setting of neutropenia; can reassess upon count recovery  - Continue to encourage lifestyle modifications: avoid straws, carbonated beverages, GERD precautions    # Risk of malnutrition  Secondary to esophagitis/GERD/mucositis picture as above, patient intake has decreased to small servings, mostly eating soup. On regular adult diet with thin liquids per SLP. Recommended trial of high caloric liquids, patient tried Strawberry Ensure and plans to continue to order with meals.  - RD following; appreciate recs     # Nausea/vomiting, improving  Patient noted ongoing nausea w/ occasional vomiting starting Dec 2024. Has been managed with PRN Zofran.  Endorsed nausea on admission, now resolved.   - PRN Zofran and compazine  - Could consider trial of PRN Zyprexa if 3rd agent is desired/required     PULM  # COPD  Longstanding history of COPD. On admission patient reports symptoms are manageable with no recent exacerbations. Most recent PFTs (2018) were normal.  - Continue PTA Breo Ellipta inhaler and PRN DuoNebs      CV  # Essential hypertension  - Continue PTA lisinopril - HELD 3/11 w/ soft BPs (90s-100s/50s-60s), resume pending daily trends    # Infrarenal abdominal aortic aneurysm  Noted incidentally on CT C/A/P (3/19/25), measuring 3.4 cm in diameter.  - Attention on follow-up imaging    RENAL/FEN  # Stage 2 CKD  Baseline Cr ~ 0.7. On admission Cr 0.77.  - Continue to trend on daily labs and encourage PO hydration     NEURO  # Chronic migraine w/o aura  # Chronic headaches  Present since childhood. Reportedly triggered by neck manipulation/movement. Reportedly well-managed with regimen below. Headaches occurring throughout admission with daily APAP use, also chronic and managed with APAP PRN at home. Patient notes  that her headaches throughout admission have been consistent with her typical daily headaches with no reported changes in character, quality, location, severity, or frequency. She denies new associated neurological changes. Considered LP to exclude CNS leukemia, but given this reassuring history and absence of other indications for LP (no hyperleukocytosis, no monocytic phenotype, no FLT3 mutation, etc), this was ultimately deferred.  - APAP PRN  - Continue PTA sumatriptan and cyclobenzaprine PRN  - Could reconsider need for LP if headache worsens/changes in character or quality in the future    MISC  # Maculopapular rash  Noted on 3/25. Pruritic, localized to the neck, right shoulder, and flexural area behind the right knee. Clinically, affected areas consist of coalescing erythematous macules with scattered papules. No obvious vesicles or bullae. DDx includes contact dermatitis (favored, possibly due to CHG wipes) versus atopic dermatitis (flexural areas raise suspicion for this) versus drug eruption versus other.  - Start triamcinolone ointment 0.1% BID to the affected areas  - Okay to defer CHG wipes given concern for contact dermatitis  - Monitor clinically    # BROOKS  # MDD  # At risk for adjustment disorder  Patient reports good control of anxiety/depressive symptoms prior to admission. Did note feeling overwhelmed by new diagnosis. Offered health psychology or cordelia, which she declined.  - Readdress health psych consult as indicated  - Continue PTA paroxetine   - Atarax as needed     # Tobacco use disorder  Has smoked since age 16, 1.5 ppd (roughly 65 pack years). Attempting to cut back as recently as 01/2025 to 0.5 ppd. We discussed the risks of smoking during induction chemotherapy including increased risk for infection in setting of severe neutropenia that could further complicate course, placing her at risk for severe complications that could be life-threatening or even fatal.  - Encourage smoking  cessation; patient continues to smoke at this time, despite understanding/acknowledgement of the risks.  - Trialed nicotine patch, then self-discontinued after reporting that she felt the nicotine patch precipitated an anxiety attack/episode of chest pain on 3/9. Offered a lower dose versus alternative form of NRT, which patient declined.      Chronic Problems:  # Vitamin D deficiency - Continue PTA vit D supplement  # Seasonal allergies - Claritin 10 mg daily  # Rheumatoid arthritis - Patient reported trying treatment though was unable to tolerate well. Managed with Tylenol PRN. Most recent RF >650 (2/10/25). JI negative.    # Prediabetes - Last A1c 6.0 x12/9/24. Has been managing with lifestyle modifications.   # Degenerative disc disease, L5-S1 - Continue PTA gabapentin PRN  # Mixed hyperlipidemia - Lipid panel has remained at goal, 1/14/25 panel w/ cholesterol 163, , LDL 92, HDL 45.   - Held PTA atorvastatin on admission due to chemotherapy interactions. Consider rotation to rosuvastatin for better drug-drug interaction profile.   # H/o aortic stenosis - Patient noted on admission longstanding history of aortic stenosis. Pre-chemo echo w/ EF 60-65% and mild aortic stenosis and insufficiency. No previous echo to compare.     Resolved Hospital Problems:  # Mild hyponatremia, resolved - New mild hyponatremia noted 3/13 with Na 135 ? 130. Asymptomatic. Now resolved.  # Non-radiating chest pain, resolved - for details see note from 3/21 and prior   # Urinary frequency + Dysuria, resolved - On admission, patient noted longstanding history of urinary frequency though new mild dysuria. UA (2/26) negative, UC with no growth. Symptoms have since resolved.  # R AOM with purulent effusion and Small L TM perforation, resolved - ENT consulted, appreciate recs. Completed 7-day course Levaquin 750mg daily and 5 day course of floxin drops to left ear for perforation     Clinically Significant Risk Factors        #  "Hypokalemia: Lowest K = 3.3 mmol/L in last 2 days, will replace as needed    # Hypocalcemia: Lowest Ca = 8.5 mg/dL in last 2 days, will monitor and replace as appropriate     # Hypoalbuminemia: Lowest albumin = 3.2 g/dL at 3/24/2025  4:59 AM, will monitor as appropriate   # Thrombocytopenia: Lowest platelets = 43 in last 2 days, will monitor for bleeding   # Hypertension: Noted on problem list            # Obesity: Estimated body mass index is 30 kg/m  as calculated from the following:    Height as of this encounter: 1.626 m (5' 4\").    Weight as of this encounter: 79.3 kg (174 lb 12.8 oz).        # Financial/Environmental Concerns: none  # Asthma: noted on problem list       Fluids/Electrolytes/Nutrition   - IVF bolus PRN   - PRN lyte replacement per standing protocol  - Regular diet as tolerated      Lines: PICC     PPX  VTE: TCP, Lovenox held 3/13  GI: Pepcid, Protonix  Bowels: prn senna and miralax  Activity: as tolerated      Code Status: DNR/DNI    Disposition: Inpatient admission to [facility] for treatment of AML; discharge to home pending count recovery and restaging BMBx, likely 7-10 more days.   Follow-up: Will need to request APPT18 closer to discharge date and determine primary oncologist. Messaged Dr. Harris x3/21 for primary oncologist preference, as Alejandra would like to follow with her at the [facility] if possible.    Medically Ready for Discharge: Anticipated in 5+ Days      Staffed with Dr. Moreno.    I spent 60 minutes in the care of this patient today, which included time necessary for review of interval events, obtaining history and physical exam, ordering medication(s)/test(s) as medically indicated, discussion with interdisciplinary/consult team(s), care coordination, and documentation time.     Chelsie Davila PA-C  Hematology/Oncology  Pager: #[phone]     Interval History   No acute overnight events. Nursing notes reviewed. Alejandra is doing well again today. " "She denies fevers, chills, nausea, vomiting, mouth sores, diarrhea, edema, or other complaints. Feels her GERD is better managed with dietary changes and has found foods she can tolerate. Still drinking 2-3 protein shakes per day as well. She does note new \"rash\" and itching to her neck, shoulder, and right posterior knee. She attributes this to the CHG wipes. Rash is itchy, non-painful. No involvement of palms/soles or mucous membranes. No history of previous similar symptoms, though she notes she has sensitive skin and has an allergy to various dressings and adhesives as well. Reviewed interval lab results and plan of care for the day. All questions and concerns addressed at bedside.      A comprehensive review of symptoms was performed and was negative except as detailed in the interval history above.    Physical Exam   Vital Signs with Ranges  Temp:  [98  F (36.7  C)-98.3  F (36.8  C)] 98.1  F (36.7  C)  Pulse:  [68-84] 73  Resp:  [16-18] 18  BP: ()/(57-83) 136/83  MAP:  [74 mmHg] 74 mmHg  SpO2:  [92 %-98 %] 97 %    I/O last 3 completed shifts:  In: 1200 [P.O.:1200]  Out: 1795 [Urine:1795]    Vitals:    03/23/25 1018 03/24/25 0942 03/25/25 1000   Weight: 78.4 kg (172 lb 12.8 oz) 79.2 kg (174 lb 8 oz) 79.3 kg (174 lb 12.8 oz)     Constitutional: Pleasant and cooperative female, in NAD. Alert, interactive, non-toxic. Smiling and conversational.  HEENT: NC/AT, sclera clear, conjunctiva normal, OP with MMM, no discrete intraoral lesions or thrush, poor dentition.  Respiratory: No increased work of breathing, no crackles or wheezing. Breath sounds mildly diminished to the lung bases bilaterally.  Cardiovascular: RRR, systolic murmur. No peripheral edema. No calf tenderness or palpable cords.  GI: Normal bowel sounds. Abdomen with central obesity, is soft, non-distended and non-tender to palpation.  Skin: Warm, dry, well-perfused. Patchy erythematous macules/patches with few scattered papula noted to the base of " the neck, right shoulder, and flexural area of the right knee. No vesicles. No warmth, tenderness, or crepitus.   Musculoskeletal: Diminished muscle bulk, no gross deformities.  Neurologic: A&O. Answers questions appropriately, speech normal. Moves all extremities spontaneously.  Psychiatric: Normal mood and affect.  Vascular access:  PICC on RUE CDI, non-tender, no surrounding erythema.    Medications   Current Facility-Administered Medications   Medication Dose Route Frequency Provider Last Rate Last Admin     Current Facility-Administered Medications   Medication Dose Route Frequency Provider Last Rate Last Admin    acyclovir (ZOVIRAX) tablet 400 mg  400 mg Oral BID Chelsie Murphy PA-C   400 mg at 03/25/25 0840    ceFEPIme (MAXIPIME) 2 g vial to attach to  mL bag for ADULTS or NS 50 mL bag for PEDS  2 g Intravenous Q8H Nery Crowell PA-C 200 mL/hr at 03/23/25 1349 2 g at 03/25/25 0408    famotidine (PEPCID) tablet 20 mg  20 mg Oral BID Zully Garcia PA-C   20 mg at 03/25/25 0840    fluticasone-vilanterol (BREO ELLIPTA) 100-25 MCG/ACT inhaler 1 puff  1 puff Inhalation Daily Chelsie Murphy PA-C   1 puff at 03/25/25 0843    heparin lock flush 10 unit/mL injection 5-20 mL  5-20 mL Intracatheter Q24H Chelsie Murphy PA-C   5 mL at 03/24/25 0500    levofloxacin (LEVAQUIN) tablet 500 mg  500 mg Oral Daily Chelsie Davila PA-C        [Held by provider] lisinopril (ZESTRIL) tablet 10 mg  10 mg Oral Daily Jyoti Tenorio PA-C   10 mg at 03/10/25 1039    loratadine (CLARITIN) tablet 10 mg  10 mg Oral Daily Chelsie Murphy PA-C   10 mg at 03/25/25 0840    pantoprazole (PROTONIX) EC tablet 40 mg  40 mg Oral BID AC Nery Crowell PA-C   40 mg at 03/25/25 0840    PARoxetine (PAXIL) tablet 20 mg  20 mg Oral Chelsie Lowery PA-C   20 mg at 03/25/25 0840    posaconazole (NOXAFIL) DR tablet TBEC 300 mg  300 mg Oral JO Crowell,  Nery ORTIZ PA-C   300 mg at 03/25/25 0840    sodium chloride (PF) 0.9% PF flush 10-40 mL  10-40 mL Intracatheter Q8H Chelsie Murphy PA-C   10 mL at 03/24/25 2141    sucralfate (CARAFATE) suspension 1 g  1 g Oral 4x Daily AC & HS Aman Cervantes MD   1 g at 03/25/25 0839    triamcinolone (KENALOG) 0.1 % ointment   Topical BID Chelsie Davila PA-C        Vitamin D3 (CHOLECALCIFEROL) tablet 1,000 Units  1,000 Units Oral Daily Chelsie Murphy PA-C   1,000 Units at 03/25/25 0840     Antiinfectives  Anti-infectives (From now, onward)      Start     Dose/Rate Route Frequency Ordered Stop    03/25/25 2000  levofloxacin (LEVAQUIN) tablet 500 mg         500 mg Oral DAILY 03/24/25 0819      03/21/25 1200  ceFEPIme (MAXIPIME) 2 g vial to attach to  mL bag for ADULTS or NS 50 mL bag for PEDS         2 g  over 30 Minutes Intravenous EVERY 8 HOURS 03/21/25 1051 03/25/25 1200    03/18/25 0800  posaconazole (NOXAFIL) DR tablet TBEC 300 mg         300 mg Oral EVERY MORNING 03/17/25 1242      02/26/25 2000  acyclovir (ZOVIRAX) tablet 400 mg         400 mg Oral 2 TIMES DAILY 02/26/25 1307            Data   CBC   Recent Labs   Lab 03/25/25  0407 03/24/25  0459 03/23/25  0532 03/22/25  0600   WBC 0.7* 0.7* 0.6* 0.7*   RBC 2.07* 2.17* 2.52* 2.46*   HGB 6.6* 7.1* 8.2* 7.9*   HCT 18.7* 19.8* 22.6* 22.1*   MCV 90 91 90 90   MCH 31.9 32.7 32.5 32.1   MCHC 35.3 35.9 36.3 35.7   RDW 14.4 14.6 15.1* 15.4*   PLT 65* 43* 31* 28*     CMP   Recent Labs   Lab 03/25/25  0407 03/24/25  0459 03/23/25  0532 03/22/25  0600    139 139 141   POTASSIUM 3.3* 3.7 3.6 3.6   CHLORIDE 105 106 106 107   CO2 24 24 21* 24   ANIONGAP 11 9 12 10   * 112* 111* 110*   BUN 14.2 15.1 13.2 12.7   CR 0.58 0.57 0.55 0.57   GFRESTIMATED >90 >90 >90 >90   TOBIN 8.5* 8.5* 8.8 8.8   MAG 2.0 2.0 2.0 2.0   PHOS 3.6 3.3 3.3 3.5   PROTTOTAL 6.0* 5.8* 6.0* 6.0*   ALBUMIN 3.3* 3.2* 3.2* 3.3*   BILITOTAL 0.2 0.3 0.3  0.4   ALKPHOS 111 107 111 105   AST 13 9 11 10   ALT 9 8 9 9     LFTs   Recent Labs   Lab 03/25/25  0407   PROTTOTAL 6.0*   ALBUMIN 3.3*   BILITOTAL 0.2   ALKPHOS 111   AST 13   ALT 9     Coagulation Studies   Recent Labs   Lab 03/24/25  0459   INR 1.13   PTT 32

## 2025-03-25 NOTE — PLAN OF CARE
Goal Outcome Evaluation:                 Outcome Evaluation: [id number]    Pain: Tylenol x1 request for headache   Neuro:WDL  CV:WDL  Resp:.WDL except - infrequent productive cough   GI:WDL, epigastric pain  :WDL  Skin: .WDL  Activity Assistance: independent  Safety/Falls Risk: Level of Risk: Moderate Risk  Note: Pt ambulated in halls and to bathroom. Pt received a unit of RBC overnight for hemoglobin of 6.6. Afebrile overnight, if they spike will need to do blood cultures.  Passing gas but no BM. No reports of nausea or vomiting overnight. Will continue to follow of POC.

## 2025-03-25 NOTE — PLAN OF CARE
Goal Outcome Evaluation:      Plan of Care Reviewed With: patient    Overall Patient Progress: no changeOverall Patient Progress: no change    Outcome Evaluation: Cycle 1 Day 21 of 7+3 Induction Chemo    Patient feeling well today; no complaints of pain or nausea. Patient walking halls/going outside frequently. Likely to have BmBx prior to discharge sometime next week. Patient eating well & likes to color when in her room. Swab for CP- sent.

## 2025-03-25 NOTE — PLAN OF CARE
[id number]  Goal Outcome Evaluation:    Plan of Care Reviewed With: patient. Overall Patient Progress: no change. Outcome Evaluation: Waiting for count recovery for next BMBx.    Vitals stable. Denies pain/nausea/sob. States acid reflux is better today. Ind, went for several walks. Reports 2 soft/loose stools today. No CHG due to new allergy/rash, pt did shower with soap & water. No itching noted at rash site, pt states cream is helping. Rectal swab resulted negative for CP-. Plan for restaging BMBx on 4/1 pending count recovery.

## 2025-03-25 NOTE — PLAN OF CARE
Goal Outcome Evaluation:      Plan of Care Reviewed With: patient    Overall Patient Progress: no change    Outcome Evaluation: C1D20 of 7+3    Significant 24 hour events:      Level of Care: Acute    Summary Type: 5A Report   Pain: atarax given x2 for pain/anxiety   Neuro:WDL  CV:WDL  Resp:.WDL except - infrequent productive cough   GI:WDL, worse epigastric pain (sore today), magic mouthwash given x1  :WDL  Skin: .WDL except, integrity. New itching and skin irritation from CHG wipes  Activity Assistance: independent  Safety/Falls Risk: Level of Risk: Moderate Risk  Consults: ENT, SLP  Procedures/Imaging:   3/19 BMBx: no evidence of AML  Precautions: Neutropenia

## 2025-03-26 DIAGNOSIS — D53.9 MACROCYTIC ANEMIA: Primary | ICD-10-CM

## 2025-03-26 DIAGNOSIS — C92.00 ACUTE MYELOID LEUKEMIA NOT HAVING ACHIEVED REMISSION (H): Primary | ICD-10-CM

## 2025-03-26 DIAGNOSIS — C92.00 ACUTE MYELOID LEUKEMIA NOT HAVING ACHIEVED REMISSION (H): ICD-10-CM

## 2025-03-26 LAB
ALBUMIN SERPL BCG-MCNC: 3.4 G/DL (ref 3.5–5.2)
ALP SERPL-CCNC: 117 U/L (ref 40–150)
ALT SERPL W P-5'-P-CCNC: 10 U/L (ref 0–50)
ANION GAP SERPL CALCULATED.3IONS-SCNC: 10 MMOL/L (ref 7–15)
APTT PPP: 29 SECONDS (ref 22–38)
AST SERPL W P-5'-P-CCNC: 12 U/L (ref 0–45)
BASOPHILS # BLD MANUAL: 0 10E3/UL (ref 0–0.2)
BASOPHILS NFR BLD MANUAL: 0 %
BILIRUB SERPL-MCNC: 0.3 MG/DL
BUN SERPL-MCNC: 12.1 MG/DL (ref 6–20)
CALCIUM SERPL-MCNC: 8.9 MG/DL (ref 8.8–10.4)
CHLORIDE SERPL-SCNC: 105 MMOL/L (ref 98–107)
CREAT SERPL-MCNC: 0.61 MG/DL (ref 0.51–0.95)
EGFRCR SERPLBLD CKD-EPI 2021: >90 ML/MIN/1.73M2
EOSINOPHIL # BLD MANUAL: 0 10E3/UL (ref 0–0.7)
EOSINOPHIL NFR BLD MANUAL: 0 %
ERYTHROCYTE [DISTWIDTH] IN BLOOD BY AUTOMATED COUNT: 14.8 % (ref 10–15)
FIBRINOGEN PPP-MCNC: 496 MG/DL (ref 170–510)
GLUCOSE SERPL-MCNC: 113 MG/DL (ref 70–99)
HCO3 SERPL-SCNC: 26 MMOL/L (ref 22–29)
HCT VFR BLD AUTO: 22.8 % (ref 35–47)
HGB BLD-MCNC: 8.1 G/DL (ref 11.7–15.7)
INR PPP: 1.11 (ref 0.85–1.15)
LDH SERPL L TO P-CCNC: 216 U/L (ref 0–250)
LYMPHOCYTES # BLD MANUAL: 0.6 10E3/UL (ref 0.8–5.3)
LYMPHOCYTES NFR BLD MANUAL: 59 %
MAGNESIUM SERPL-MCNC: 2.4 MG/DL (ref 1.7–2.3)
MCH RBC QN AUTO: 31.3 PG (ref 26.5–33)
MCHC RBC AUTO-ENTMCNC: 35.5 G/DL (ref 31.5–36.5)
MCV RBC AUTO: 88 FL (ref 78–100)
MONOCYTES # BLD MANUAL: 0.1 10E3/UL (ref 0–1.3)
MONOCYTES NFR BLD MANUAL: 7 %
NEUTROPHILS # BLD MANUAL: 0.3 10E3/UL (ref 1.6–8.3)
NEUTROPHILS NFR BLD MANUAL: 34 %
NRBC # BLD AUTO: 0 10E3/UL
NRBC BLD MANUAL-RTO: 3 %
PHOSPHATE SERPL-MCNC: 3 MG/DL (ref 2.5–4.5)
PLAT MORPH BLD: NORMAL
PLATELET # BLD AUTO: 161 10E3/UL (ref 150–450)
POTASSIUM SERPL-SCNC: 3.7 MMOL/L (ref 3.4–5.3)
PROT SERPL-MCNC: 6.4 G/DL (ref 6.4–8.3)
RBC # BLD AUTO: 2.59 10E6/UL (ref 3.8–5.2)
RBC MORPH BLD: NORMAL
SODIUM SERPL-SCNC: 141 MMOL/L (ref 135–145)
URATE SERPL-MCNC: 2.3 MG/DL (ref 2.4–5.7)
WBC # BLD AUTO: 1 10E3/UL (ref 4–11)

## 2025-03-26 PROCEDURE — 85014 HEMATOCRIT: CPT

## 2025-03-26 PROCEDURE — 250N000013 HC RX MED GY IP 250 OP 250 PS 637: Performed by: PHYSICIAN ASSISTANT

## 2025-03-26 PROCEDURE — 84100 ASSAY OF PHOSPHORUS: CPT | Performed by: PHYSICIAN ASSISTANT

## 2025-03-26 PROCEDURE — 999N000007 HC SITE CHECK

## 2025-03-26 PROCEDURE — 85610 PROTHROMBIN TIME: CPT | Performed by: PHYSICIAN ASSISTANT

## 2025-03-26 PROCEDURE — 250N000011 HC RX IP 250 OP 636

## 2025-03-26 PROCEDURE — 83615 LACTATE (LD) (LDH) ENZYME: CPT

## 2025-03-26 PROCEDURE — 250N000013 HC RX MED GY IP 250 OP 250 PS 637

## 2025-03-26 PROCEDURE — 85730 THROMBOPLASTIN TIME PARTIAL: CPT | Performed by: PHYSICIAN ASSISTANT

## 2025-03-26 PROCEDURE — 80053 COMPREHEN METABOLIC PANEL: CPT

## 2025-03-26 PROCEDURE — 250N000013 HC RX MED GY IP 250 OP 250 PS 637: Performed by: HOSPITALIST

## 2025-03-26 PROCEDURE — 999N000044 HC STATISTIC CVC DRESSING CHANGE

## 2025-03-26 PROCEDURE — 99233 SBSQ HOSP IP/OBS HIGH 50: CPT | Mod: FS

## 2025-03-26 PROCEDURE — 84550 ASSAY OF BLOOD/URIC ACID: CPT | Performed by: PHYSICIAN ASSISTANT

## 2025-03-26 PROCEDURE — 85007 BL SMEAR W/DIFF WBC COUNT: CPT

## 2025-03-26 PROCEDURE — 85384 FIBRINOGEN ACTIVITY: CPT | Performed by: PHYSICIAN ASSISTANT

## 2025-03-26 PROCEDURE — 120N000002 HC R&B MED SURG/OB UMMC

## 2025-03-26 PROCEDURE — 83735 ASSAY OF MAGNESIUM: CPT

## 2025-03-26 RX ORDER — EPINEPHRINE 1 MG/ML
0.3 INJECTION, SOLUTION, CONCENTRATE INTRAVENOUS EVERY 5 MIN PRN
OUTPATIENT
Start: 2025-04-02

## 2025-03-26 RX ORDER — HEPARIN SODIUM,PORCINE 10 UNIT/ML
5-20 VIAL (ML) INTRAVENOUS DAILY PRN
OUTPATIENT
Start: 2025-04-02

## 2025-03-26 RX ORDER — HEPARIN SODIUM (PORCINE) LOCK FLUSH IV SOLN 100 UNIT/ML 100 UNIT/ML
5 SOLUTION INTRAVENOUS
OUTPATIENT
Start: 2025-04-02

## 2025-03-26 RX ORDER — DIPHENHYDRAMINE HYDROCHLORIDE 50 MG/ML
50 INJECTION, SOLUTION INTRAMUSCULAR; INTRAVENOUS
Start: 2025-04-02

## 2025-03-26 RX ADMIN — Medication 5 ML: at 05:36

## 2025-03-26 RX ADMIN — SUCRALFATE ORAL 1 G: 1 SUSPENSION ORAL at 16:45

## 2025-03-26 RX ADMIN — PANTOPRAZOLE SODIUM 40 MG: 40 TABLET, DELAYED RELEASE ORAL at 07:57

## 2025-03-26 RX ADMIN — SUCRALFATE ORAL 1 G: 1 SUSPENSION ORAL at 07:57

## 2025-03-26 RX ADMIN — FAMOTIDINE 20 MG: 20 TABLET, FILM COATED ORAL at 20:53

## 2025-03-26 RX ADMIN — LORATADINE 10 MG: 10 TABLET ORAL at 07:57

## 2025-03-26 RX ADMIN — Medication 1000 UNITS: at 07:57

## 2025-03-26 RX ADMIN — ACETAMINOPHEN 650 MG: 325 TABLET, FILM COATED ORAL at 16:48

## 2025-03-26 RX ADMIN — HYDROXYZINE HYDROCHLORIDE 25 MG: 25 TABLET, FILM COATED ORAL at 15:04

## 2025-03-26 RX ADMIN — LEVOFLOXACIN 500 MG: 500 TABLET, FILM COATED ORAL at 07:57

## 2025-03-26 RX ADMIN — PANTOPRAZOLE SODIUM 40 MG: 40 TABLET, DELAYED RELEASE ORAL at 16:45

## 2025-03-26 RX ADMIN — ACYCLOVIR 400 MG: 400 TABLET ORAL at 07:57

## 2025-03-26 RX ADMIN — FAMOTIDINE 20 MG: 20 TABLET, FILM COATED ORAL at 08:04

## 2025-03-26 RX ADMIN — FLUTICASONE FUROATE AND VILANTEROL TRIFENATATE 1 PUFF: 100; 25 POWDER RESPIRATORY (INHALATION) at 07:57

## 2025-03-26 RX ADMIN — SUCRALFATE ORAL 1 G: 1 SUSPENSION ORAL at 21:23

## 2025-03-26 RX ADMIN — ACETAMINOPHEN 650 MG: 325 TABLET, FILM COATED ORAL at 03:43

## 2025-03-26 RX ADMIN — POSACONAZOLE 300 MG: 100 TABLET, DELAYED RELEASE ORAL at 07:58

## 2025-03-26 RX ADMIN — PAROXETINE HYDROCHLORIDE 20 MG: 20 TABLET, FILM COATED ORAL at 07:58

## 2025-03-26 RX ADMIN — TRIAMCINOLONE ACETONIDE: 1 OINTMENT TOPICAL at 20:54

## 2025-03-26 RX ADMIN — ACYCLOVIR 400 MG: 400 TABLET ORAL at 20:53

## 2025-03-26 ASSESSMENT — ACTIVITIES OF DAILY LIVING (ADL)
ADLS_ACUITY_SCORE: 33
ADLS_ACUITY_SCORE: 31
ADLS_ACUITY_SCORE: 33
ADLS_ACUITY_SCORE: 31
ADLS_ACUITY_SCORE: 33
ADLS_ACUITY_SCORE: 31
ADLS_ACUITY_SCORE: 33

## 2025-03-26 NOTE — PLAN OF CARE
"Goal Outcome Evaluation:      Plan of Care Reviewed With: patient    Overall Patient Progress: no changeOverall Patient Progress: no change    Outcome Evaluation: No acute changes this shift    1900- 0700     BP (!) 142/76 (BP Location: Left arm)   Pulse 77   Temp 98.1  F (36.7  C) (Oral)   Resp 18   Ht 1.626 m (5' 4\")   Wt 79.3 kg (174 lb 12.8 oz)   LMP 01/01/2007 (Approximate)   SpO2 97%   BMI 30.00 kg/m       Reason for admission: 2/26/25 for further work-up and management for AML   Activity: UAL  Pain: x1 prn tylenol for headache  Neuro: AOX4  Cardiac: WDL  Respiratory: RA, no s/s of distress  GI/: Denies nausea, voiding spontaneously. LBM 3/25  Diet: Regular, good appetite  Lines: R. DL PICC  Wounds: Rash on neck, shoulder, inner thigh behind knes, around PICC site.  Labs/imaging: Reviewed, no replacements given. Potassium recheck 3.8      No acute changes this shift. Pt sleeping between cares and walking around the unit. PRN atarax given  Plan: Tentatively scheduled for a restaging BMBx on 4/1      Continue to monitor and follow POC     "

## 2025-03-26 NOTE — PLAN OF CARE
Goal Outcome Evaluation:      Plan of Care Reviewed With: patient    Overall Patient Progress: no changeOverall Patient Progress: no change    Outcome Evaluation: Cycle 1 Day 21 7+3    Patient feeling well this morning; no complaints of pain or nausea. Patient stated she didn't sleep well last night; kept waking up thinking that she was home. BmBx scheduled for Monday 03/31 at 11am. Patient to likely discharge Tuesday (?) after BmBx results so patient has a solid discharge plan. Patient ambulating halls & out to smoke several times per day.

## 2025-03-26 NOTE — PROGRESS NOTES
[facility]    Progress Note  Hematology / Oncology     Date of Admission:  2/26/2025  Hospital Day #: 28   Date of Service (when I saw the patient): 03/26/2025    Assessment & Plan   Alejandra Villegas is a 59 year old female with a past medical history significant for COPD, migraines, rheumatoid arthritis, aortic stenosis, Afib, and anxiety who presented to an outside hospital with cytopenias and was found on BMBx to have hypercellular marrow with 4% blasts, 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 She was subsequently admitted to [facility] on 2/26/25 for further work-up and management and pathology re-review was most consistent with AML with NPM1 mutation by WHO 2022 (which does not require a specific blast threshold). Following further multidisciplinary discussion, she started intensive induction with 7+3 (D1=3/5/25). Her course has been complicated by neutropenic fevers, epigastric pain, influenza A, AOM, and L TM perforation.    TODAY:  - D22 7+3 induction.   - Tentatively scheduled for a restaging BMBx on 3/31 at 11 AM; can adjust PRN pending count recovery.  - Continue triamcinolone ointment to areas of rash on neck, shoulder, and knee; suspect likely contact dermatitis due to CHG wipes. Improving. Monitor clinically.    HEME  # AML with NPM1 mutation  Had been seen by PCP Dec 2024 w/ progressive fatigue and nausea where she was found leukopenic WBC  2.4 and neutropenic w/ ANC 0.3. Hgb 12. Was referred to local hematology/oncology clinic for further evaluation and seen by Dr. Samina Harris. BMBx 2/10/25 done at [facility] showed hypercellular marrow w/ trilineage hematopoiesis w/ dyspoiesis with mildly elevated blasts of 4% on morphology. NGS w/ NPM1, IDH2, and NRAS mutations. She was admitted to [facility] 2/26/25 for further workup and management. Slides were re-reviewed by [facility] Hematopathology, who noted hypercellular marrow with 8% blasts by morphology.  Despite relatively low blast percentage, findings were felt most consistent with a diagnosis AML with NPM1 mutation by WHO 2022 (which does not require a specific blast threshold). Following interdepartmental discussions and review of the available literature, patient was started on intensive induction with 7+3 (Day 1=3/5/25).   - PICC placed 3/5/2025, plan to discontinue on discharge.   - Baseline cardiopulmonary studies:  - Echo w/ EF 60-65%, mild known aortic stenosis.   - EKG (2/27) with NSR, QTc 412  - CXR (2/26) with mildly increased streaky bibasilar opacities, atelectasis or edema. No consolidation.   - Baseline viral serologies: CMV IgG+, EBV IgG+, HepB sAg-, HepB cAb-, HepB sAb-, HSV1+/2+, HIV-  - HLA Typing sent on admission. Message sent to notify BMT team x2/27 for IP consult.   - Midcycle BMBx 3/19/25: Flow with no increase in myeloid blasts and no abnormal myeloid blast population. Morphology with no morphologic or immunophenotypic evidence of acute myeloid leukemia.                 Treatment plan: 7+3 (C1D1=3/5/2025)  - Daunorubicin 60 mg/m  IV D1-3  - Cytarabine 100 mg/m  IV D1-7    Supportive medications: Zofran, dexamethasone 12 mg D1-3     # Pancytopenia  Secondary to underlying hematologic malignancy and exacerbated by recent chemotherapy.  - Follow daily CBC with differential  - Transfuse to keep Hgb >7, plt >10K  - Blood consent obtained 2/26/25 on admission and placed in patient's paper chart.      ID  # Neutropenic fever, resolved  # Influenza A, resolved  Episode #1 of neutropenic fever:  2/25: On admission, patient noted subjective fever with chills and rhinitis beginning 2/25 PM prior to admission. No other localizing s/sx of infection. She was afebrile on admission but spiked a low-grade fever to 100.7 on 2/26 PM. Blood and urine cultures negative, CXR with streaky opacities but no burt consolidation. Work-up positive for influenza A, and she completed a course of Tamiflu 75 mg BID x5  days (2/26-3/3). She also received a short empiric course of IV cefepime (2/26-3/1) given concurrent neutropenia.  Episode #2:  3/13: Febrile to 100.4 overnight x3/13. No localizing/new symptoms. OVSS. Patient was wearing heated jacket that she attributed to temperature, no documentation noted of notifying cross cover MD. No further ID workup (BCx, UA, UC, or CXR) completed and no antibiotic changes made. Given borderline temperature, decision made to continue monitoring and no further work-up was undertaken.  Episode #3:  3/19: Tmax 100.6 overnight. Asymptomatic, although notes sweating upon waking the following morning. Started IV Cefepime 2 g q8h (3/18-X). CXR with scattered patchy densities, especially in the lung bases. Respiratory panel negative. UA with protein and blood but otherwise bland, UCx negative. BCx NGTD. A CT C/A/P was done (for other reasons, as below) and notable for findings suggestive of esophagitis as well as possible bronchitis/small airways disease. She was treated with a 7-day course of IV cefepime (3/18-3/25) and fever did not recur.    # ID prophylaxis  - Acyclovir 400 mg BID  - Levaquin 500 mg daily  - Posaconazole 300 mg daily   - Vori w/ $1.60 copay, posa requiring PA also w/ $1.60 copay. Rotated from deny to vori (3/13-3/17) w/ completion of chemotherapy, then transitioned to posa x3/18 given visual hallucinations x3/17     GI  # GERD, improved  # Epigastric pain, resolved  Reported recent increase in symptoms in the days leading up to admission and initiated famotidine. Persistent/progressive symptoms throughout admission. Cardiac work-up reassuring, lipase negative. Increased H2B to BID (3/9); given persistent symptoms, PPI started (3/14). Increased to BID (3/19). CT C/A/P with evidence of esophagitis (thought due to reflux/recent chemotherapy) and small hiatal hernia. Symptoms have improved on maximal medical therapy as below.  - Continue famotidine 20 mg BID  - Continue Protonix  40 mg BID  - GI cocktail PRN  - TUMS PRN  - GI consulted, EGD deferred for now as risks thought to outweigh the benefits in the setting of neutropenia; can reassess upon count recovery  - Continue to encourage lifestyle modifications: avoid straws, carbonated beverages, GERD precautions    # Risk of malnutrition  Secondary to esophagitis/GERD/mucositis picture as above, patient intake has decreased to small servings, mostly eating soup. On regular adult diet with thin liquids per SLP. Recommended trial of high caloric liquids, patient tried Strawberry Ensure and plans to continue to order with meals.  - RD following; appreciate recs     # Nausea/vomiting, improving  Patient noted ongoing nausea w/ occasional vomiting starting Dec 2024. Has been managed with PRN Zofran.  Endorsed nausea on admission, now resolved.   - PRN Zofran and compazine  - Could consider trial of PRN Zyprexa if 3rd agent is desired/required     PULM  # COPD  Longstanding history of COPD. On admission patient reports symptoms are manageable with no recent exacerbations. Most recent PFTs (2018) were normal.  - Continue PTA Breo Ellipta inhaler and PRN DuoNebs      CV  # Essential hypertension  - Continue PTA lisinopril - HELD 3/11 w/ soft BPs (90s-100s/50s-60s), resume pending daily trends    # Infrarenal abdominal aortic aneurysm  Noted incidentally on CT C/A/P (3/19/25), measuring 3.4 cm in diameter.  - Attention on follow-up imaging    RENAL/FEN  # Stage 2 CKD  Baseline Cr ~ 0.7. On admission Cr 0.77.  - Continue to trend on daily labs and encourage PO hydration     NEURO  # Chronic migraine w/o aura  # Chronic headaches  Present since childhood. Reportedly triggered by neck manipulation/movement. Reportedly well-managed with regimen below. Headaches occurring throughout admission with daily APAP use, also chronic and managed with APAP PRN at home. Patient notes that her headaches throughout admission have been consistent with her typical  daily headaches with no reported changes in character, quality, location, severity, or frequency. She denies new associated neurological changes. Considered LP to exclude CNS leukemia, but given this reassuring history and absence of other indications for LP (no hyperleukocytosis, no monocytic phenotype, no FLT3 mutation, etc), this was ultimately deferred.  - APAP PRN  - Continue PTA sumatriptan and cyclobenzaprine PRN  - Could reconsider need for LP if headache worsens/changes in character or quality in the future    MISC  # Maculopapular rash  Noted on 3/25. Pruritic, localized to the neck, right shoulder, and flexural area behind the right knee. Clinically, affected areas consist of coalescing erythematous macules with scattered papules. No obvious vesicles or bullae. DDx includes contact dermatitis (favored, possibly due to CHG wipes) versus atopic dermatitis (flexural areas raise suspicion for this) versus drug eruption versus other. Improved as of 3/26 with triamcinolone ointment.  - Continue triamcinolone ointment 0.1% BID to the affected areas  - Okay to defer CHG wipes given concern for contact dermatitis  - Monitor clinically    # BROOKS  # MDD  # At risk for adjustment disorder  Patient reports good control of anxiety/depressive symptoms prior to admission. Did note feeling overwhelmed by new diagnosis. Offered health psychology or cordelia, which she declined.  - Readdress health psych consult as indicated  - Continue PTA paroxetine   - Atarax as needed     # Tobacco use disorder  Has smoked since age 16, 1.5 ppd (roughly 65 pack years). Attempting to cut back as recently as 01/2025 to 0.5 ppd. We discussed the risks of smoking during induction chemotherapy including increased risk for infection in setting of severe neutropenia that could further complicate course, placing her at risk for severe complications that could be life-threatening or even fatal.  - Encourage smoking cessation; patient continues to  smoke at this time, despite understanding/acknowledgement of the risks.  - Trialed nicotine patch, then self-discontinued after reporting that she felt the nicotine patch precipitated an anxiety attack/episode of chest pain on 3/9. Offered a lower dose versus alternative form of NRT, which patient declined.      Chronic Problems:  # Vitamin D deficiency - Continue PTA vit D supplement  # Seasonal allergies - Claritin 10 mg daily  # Rheumatoid arthritis - Patient reported trying treatment though was unable to tolerate well. Managed with Tylenol PRN. Most recent RF >650 (2/10/25). JI negative.    # Prediabetes - Last A1c 6.0 x12/9/24. Has been managing with lifestyle modifications.   # Degenerative disc disease, L5-S1 - Continue PTA gabapentin PRN  # Mixed hyperlipidemia - Lipid panel has remained at goal, 1/14/25 panel w/ cholesterol 163, , LDL 92, HDL 45.   - Held PTA atorvastatin on admission due to chemotherapy interactions. Consider rotation to rosuvastatin for better drug-drug interaction profile.   # H/o aortic stenosis - Patient noted on admission longstanding history of aortic stenosis. Pre-chemo echo w/ EF 60-65% and mild aortic stenosis and insufficiency. No previous echo to compare.     Resolved Hospital Problems:  # Mild hyponatremia, resolved - New mild hyponatremia noted 3/13 with Na 135 ? 130. Asymptomatic. Now resolved.  # Non-radiating chest pain, resolved - for details see note from 3/21 and prior   # Urinary frequency + Dysuria, resolved - On admission, patient noted longstanding history of urinary frequency though new mild dysuria. UA (2/26) negative, UC with no growth. Symptoms have since resolved.  # R AOM with purulent effusion and Small L TM perforation, resolved - ENT consulted, appreciate recs. Completed 7-day course Levaquin 750mg daily and 5 day course of floxin drops to left ear for perforation     Clinically Significant Risk Factors        # Hypokalemia: Lowest K = 3.3 mmol/L in  "last 2 days, will replace as needed    # Hypocalcemia: Lowest Ca = 8.5 mg/dL in last 2 days, will monitor and replace as appropriate     # Hypoalbuminemia: Lowest albumin = 3.2 g/dL at 3/24/2025  4:59 AM, will monitor as appropriate   # Thrombocytopenia: Lowest platelets = 65 in last 2 days, will monitor for bleeding   # Hypertension: Noted on problem list            # Obesity: Estimated body mass index is 30 kg/m  as calculated from the following:    Height as of this encounter: 1.626 m (5' 4\").    Weight as of this encounter: 79.3 kg (174 lb 12.8 oz).        # Financial/Environmental Concerns: none  # Asthma: noted on problem list       Fluids/Electrolytes/Nutrition   - IVF bolus PRN   - PRN lyte replacement per standing protocol  - Regular diet as tolerated      Lines: PICC     PPX  VTE: TCP, Lovenox held 3/13  GI: Pepcid, Protonix  Bowels: prn senna and miralax  Activity: as tolerated      Code Status: DNR/DNI    Disposition: Inpatient admission to [facility] service for treatment of AML; discharge to home pending count recovery and restaging BMBx, likely 7-10 more days.   Follow-up: Will need to request APPT18 closer to discharge date and determine primary oncologist. Messaged Dr. Harris x3/21 for primary oncologist preference, as Alejandra would like to follow with her at the [facility] if possible.    Medically Ready for Discharge: Anticipated in 5+ Days      Staffed with Dr. Jaimes.    I spent >60 minutes in the care of this patient today, which included time necessary for review of interval events, obtaining history and physical exam, ordering medication(s)/test(s) as medically indicated, discussion with interdisciplinary/consult team(s), care coordination, and documentation time.     Andrey Crowell PA-C  Hematology/Oncology  Pager [phone]    Interval History   No acute overnight events. Nursing notes reviewed. Afebrile and hemodynamically stable.    Alejandra notes missing home this " morning, with recent news that her brother was diagnosed with cancer. Offered health psych services again today, which she respectfully declines. Reviewed treatment timeline with repeat bone marrow biopsy early next week, to which she is agreeable. Discussed ongoing follow up planning at [facility], to which she is appreciative. Epigastric pain much improved with GERD precautions. Lingering headache which she associates with her stress of ongoing hospital stay, denies vision changes, nuchal rigidity, fevers. Rash is improving with use of topical triamcinolone, no longer pruritic and less erythematous per patient. Formed bowel movements every other day. Appetite is improving. Denies chest pain, shortness of breath, abdominal pain, nausea/vomiting/diarrhea, peripheral edema. All concerns were addressed and questions were answered.     A comprehensive review of symptoms was performed and was negative except as detailed in the interval history above.    Physical Exam   Vital Signs with Ranges  Temp:  [98.1  F (36.7  C)-98.7  F (37.1  C)] 98.2  F (36.8  C)  Pulse:  [73-86] 86  Resp:  [16-20] 18  BP: (111-142)/(67-83) 111/70  SpO2:  [92 %-99 %] 92 %    I/O last 3 completed shifts:  In: 774 [P.O.:474]  Out: 900 [Urine:900]    Vitals:    03/23/25 1018 03/24/25 0942 03/25/25 1000   Weight: 78.4 kg (172 lb 12.8 oz) 79.2 kg (174 lb 8 oz) 79.3 kg (174 lb 12.8 oz)     Constitutional: Pleasant and cooperative female, in NAD. Alert, interactive, non-toxic. Smiling and conversational.  HEENT: NC/AT, sclera clear, conjunctiva normal, OP with MMM, no discrete intraoral lesions or thrush, poor dentition.  Respiratory: No increased work of breathing, no crackles or wheezing. Breath sounds mildly diminished to the lung bases bilaterally.  Cardiovascular: RRR, systolic murmur. No peripheral edema. No calf tenderness or palpable cords.  GI: Normal bowel sounds. Abdomen with central obesity, is soft, non-distended and non-tender to  palpation.  Skin: Warm, dry, well-perfused. Patchy erythematous macules/patches with few scattered papula noted to the base of the neck, right shoulder, and flexural area of the right knee. No vesicles. No warmth, tenderness, or crepitus.   Musculoskeletal: Diminished muscle bulk, no gross deformities.  Neurologic: A&O. Answers questions appropriately, speech normal. Moves all extremities spontaneously.  Psychiatric: Normal mood and affect.  Vascular access:  PICC on RUE CDI, non-tender, no surrounding erythema.    Medications   Current Facility-Administered Medications   Medication Dose Route Frequency Provider Last Rate Last Admin     Current Facility-Administered Medications   Medication Dose Route Frequency Provider Last Rate Last Admin    acyclovir (ZOVIRAX) tablet 400 mg  400 mg Oral BID Chelsie Murphy PA-C   400 mg at 03/25/25 2007    famotidine (PEPCID) tablet 20 mg  20 mg Oral BID Zully Garcia PA-C   20 mg at 03/25/25 2007    fluticasone-vilanterol (BREO ELLIPTA) 100-25 MCG/ACT inhaler 1 puff  1 puff Inhalation Daily Chelsie Murphy PA-C   1 puff at 03/25/25 0843    heparin lock flush 10 unit/mL injection 5-20 mL  5-20 mL Intracatheter Q24H Chelsie Murphy PA-C   5 mL at 03/26/25 0536    levofloxacin (LEVAQUIN) tablet 500 mg  500 mg Oral Daily Chelsie Davila PA-C   500 mg at 03/25/25 2007    [Held by provider] lisinopril (ZESTRIL) tablet 10 mg  10 mg Oral Daily Jyoti Tenorio PA-C   10 mg at 03/10/25 1039    loratadine (CLARITIN) tablet 10 mg  10 mg Oral Daily Chelsie Murphy PA-C   10 mg at 03/25/25 0840    pantoprazole (PROTONIX) EC tablet 40 mg  40 mg Oral BID Nery Huff PA-C   40 mg at 03/25/25 1557    PARoxetine (PAXIL) tablet 20 mg  20 mg Oral QAM Chelsie Murphy PA-C   20 mg at 03/25/25 0840    posaconazole (NOXAFIL) DR tablet TBEC 300 mg  300 mg Oral Nery Dubose PA-C   300 mg at 03/25/25  0840    sodium chloride (PF) 0.9% PF flush 10-40 mL  10-40 mL Intracatheter Q8H Chelsie Murphy PA-C   10 mL at 03/26/25 0536    sucralfate (CARAFATE) suspension 1 g  1 g Oral 4x Daily AC & HS Aman Cervantes MD   1 g at 03/25/25 2139    triamcinolone (KENALOG) 0.1 % ointment   Topical BID Chelsie Davila PA-C   Given at 03/25/25 2007    Vitamin D3 (CHOLECALCIFEROL) tablet 1,000 Units  1,000 Units Oral Daily Chelsie Murphy PA-C   1,000 Units at 03/25/25 0840     Antiinfectives  Anti-infectives (From now, onward)      Start     Dose/Rate Route Frequency Ordered Stop    03/25/25 2000  levofloxacin (LEVAQUIN) tablet 500 mg         500 mg Oral DAILY 03/24/25 0819      03/18/25 0800  posaconazole (NOXAFIL) DR tablet TBEC 300 mg         300 mg Oral EVERY MORNING 03/17/25 1242      02/26/25 2000  acyclovir (ZOVIRAX) tablet 400 mg         400 mg Oral 2 TIMES DAILY 02/26/25 1307            Data   CBC   Recent Labs   Lab 03/26/25  0541 03/25/25  0407 03/24/25  0459 03/23/25  0532   WBC 1.0* 0.7* 0.7* 0.6*   RBC 2.59* 2.07* 2.17* 2.52*   HGB 8.1* 6.6* 7.1* 8.2*   HCT 22.8* 18.7* 19.8* 22.6*   MCV 88 90 91 90   MCH 31.3 31.9 32.7 32.5   MCHC 35.5 35.3 35.9 36.3   RDW 14.8 14.4 14.6 15.1*    65* 43* 31*     CMP   Recent Labs   Lab 03/26/25  0541 03/25/25  1904 03/25/25  0407 03/24/25  0459 03/23/25  0532     --  140 139 139   POTASSIUM 3.7 3.8 3.3* 3.7 3.6   CHLORIDE 105  --  105 106 106   CO2 26  --  24 24 21*   ANIONGAP 10  --  11 9 12   *  --  114* 112* 111*   BUN 12.1  --  14.2 15.1 13.2   CR 0.61  --  0.58 0.57 0.55   GFRESTIMATED >90  --  >90 >90 >90   TOBIN 8.9  --  8.5* 8.5* 8.8   MAG 2.4*  --  2.0 2.0 2.0   PHOS 3.0  --  3.6 3.3 3.3   PROTTOTAL 6.4  --  6.0* 5.8* 6.0*   ALBUMIN 3.4*  --  3.3* 3.2* 3.2*   BILITOTAL 0.3  --  0.2 0.3 0.3   ALKPHOS 117  --  111 107 111   AST 12  --  13 9 11   ALT 10  --  9 8 9     LFTs   Recent Labs   Lab 03/26/25  0540    PROTTOTAL 6.4   ALBUMIN 3.4*   BILITOTAL 0.3   ALKPHOS 117   AST 12   ALT 10     Coagulation Studies   Recent Labs   Lab 03/26/25  0541   INR 1.11   PTT 29

## 2025-03-26 NOTE — PLAN OF CARE
Goal Outcome Evaluation:      Plan of Care Reviewed With: patient    Overall Patient Progress: no changeOverall Patient Progress: no change     1500 - 1930: D22 7+3. A&Ox4. VSS. Denies N/V and SOB. Back pain relieved with 650mg Tylenol x1. Triamcinolone cream used for rash. Atarax helping with anxiety and acid reflux. No significant events this shift. Continue to monitor.

## 2025-03-26 NOTE — PROGRESS NOTES
[facility] - Progress Note  [place] 5A    Referral Source: Routine Visit per Length Stay    I oriented Alejandra to Spirit[facility] Services.  She was not interested in a visit at this time.  I let her know how to request a visit should she desire one at a later time.    Plan: [facility] Services remain available on request. Please place a standard consult order on Epic.    Billie Dinh,   Chaplain Resident    [facility] routine referrals *[phone]   [facility] available 24/7 for emergent requests/referrals, either by paging the on-call  or by entering an ASAP/STAT consult in Epic (this will also page the on-call ).

## 2025-03-27 LAB
ALBUMIN SERPL BCG-MCNC: 3.4 G/DL (ref 3.5–5.2)
ALP SERPL-CCNC: 118 U/L (ref 40–150)
ALT SERPL W P-5'-P-CCNC: 11 U/L (ref 0–50)
ANION GAP SERPL CALCULATED.3IONS-SCNC: 11 MMOL/L (ref 7–15)
AST SERPL W P-5'-P-CCNC: 13 U/L (ref 0–45)
BASOPHILS # BLD AUTO: 0 10E3/UL (ref 0–0.2)
BASOPHILS NFR BLD AUTO: 0 %
BILIRUB SERPL-MCNC: 0.2 MG/DL
BUN SERPL-MCNC: 10.3 MG/DL (ref 6–20)
CALCIUM SERPL-MCNC: 8.9 MG/DL (ref 8.8–10.4)
CHLORIDE SERPL-SCNC: 104 MMOL/L (ref 98–107)
CREAT SERPL-MCNC: 0.57 MG/DL (ref 0.51–0.95)
EGFRCR SERPLBLD CKD-EPI 2021: >90 ML/MIN/1.73M2
EOSINOPHIL # BLD AUTO: 0 10E3/UL (ref 0–0.7)
EOSINOPHIL NFR BLD AUTO: 0 %
ERYTHROCYTE [DISTWIDTH] IN BLOOD BY AUTOMATED COUNT: 14.7 % (ref 10–15)
GLUCOSE SERPL-MCNC: 114 MG/DL (ref 70–99)
HCO3 SERPL-SCNC: 25 MMOL/L (ref 22–29)
HCT VFR BLD AUTO: 24.1 % (ref 35–47)
HGB BLD-MCNC: 8.2 G/DL (ref 11.7–15.7)
IMM GRANULOCYTES # BLD: 0 10E3/UL
IMM GRANULOCYTES NFR BLD: 2 %
LDH SERPL L TO P-CCNC: 266 U/L (ref 0–250)
LYMPHOCYTES # BLD AUTO: 0.8 10E3/UL (ref 0.8–5.3)
LYMPHOCYTES NFR BLD AUTO: 49 %
MAGNESIUM SERPL-MCNC: 2.2 MG/DL (ref 1.7–2.3)
MCH RBC QN AUTO: 31.7 PG (ref 26.5–33)
MCHC RBC AUTO-ENTMCNC: 34 G/DL (ref 31.5–36.5)
MCV RBC AUTO: 93 FL (ref 78–100)
MONOCYTES # BLD AUTO: 0.2 10E3/UL (ref 0–1.3)
MONOCYTES NFR BLD AUTO: 13 %
NEUTROPHILS # BLD AUTO: 0.6 10E3/UL (ref 1.6–8.3)
NEUTROPHILS NFR BLD AUTO: 36 %
NRBC # BLD AUTO: 0 10E3/UL
NRBC BLD AUTO-RTO: 2 /100
PHOSPHATE SERPL-MCNC: 3.5 MG/DL (ref 2.5–4.5)
PLAT MORPH BLD: NORMAL
PLATELET # BLD AUTO: 317 10E3/UL (ref 150–450)
POTASSIUM SERPL-SCNC: 3.4 MMOL/L (ref 3.4–5.3)
POTASSIUM SERPL-SCNC: 3.8 MMOL/L (ref 3.4–5.3)
PROT SERPL-MCNC: 6.3 G/DL (ref 6.4–8.3)
RBC # BLD AUTO: 2.59 10E6/UL (ref 3.8–5.2)
RBC MORPH BLD: NORMAL
SODIUM SERPL-SCNC: 140 MMOL/L (ref 135–145)
URATE SERPL-MCNC: 2.4 MG/DL (ref 2.4–5.7)
WBC # BLD AUTO: 1.6 10E3/UL (ref 4–11)

## 2025-03-27 PROCEDURE — 82040 ASSAY OF SERUM ALBUMIN: CPT

## 2025-03-27 PROCEDURE — 250N000011 HC RX IP 250 OP 636

## 2025-03-27 PROCEDURE — 250N000013 HC RX MED GY IP 250 OP 250 PS 637: Performed by: HOSPITALIST

## 2025-03-27 PROCEDURE — 83735 ASSAY OF MAGNESIUM: CPT

## 2025-03-27 PROCEDURE — 99233 SBSQ HOSP IP/OBS HIGH 50: CPT | Mod: FS

## 2025-03-27 PROCEDURE — 120N000002 HC R&B MED SURG/OB UMMC

## 2025-03-27 PROCEDURE — 83615 LACTATE (LD) (LDH) ENZYME: CPT

## 2025-03-27 PROCEDURE — 250N000013 HC RX MED GY IP 250 OP 250 PS 637

## 2025-03-27 PROCEDURE — 85041 AUTOMATED RBC COUNT: CPT

## 2025-03-27 PROCEDURE — 84132 ASSAY OF SERUM POTASSIUM: CPT

## 2025-03-27 PROCEDURE — 250N000013 HC RX MED GY IP 250 OP 250 PS 637: Performed by: PHYSICIAN ASSISTANT

## 2025-03-27 PROCEDURE — 84550 ASSAY OF BLOOD/URIC ACID: CPT | Performed by: PHYSICIAN ASSISTANT

## 2025-03-27 PROCEDURE — 84100 ASSAY OF PHOSPHORUS: CPT | Performed by: PHYSICIAN ASSISTANT

## 2025-03-27 PROCEDURE — 85004 AUTOMATED DIFF WBC COUNT: CPT

## 2025-03-27 RX ORDER — POTASSIUM CHLORIDE 750 MG/1
40 TABLET, EXTENDED RELEASE ORAL ONCE
Status: COMPLETED | OUTPATIENT
Start: 2025-03-27 | End: 2025-03-27

## 2025-03-27 RX ADMIN — TRIAMCINOLONE ACETONIDE: 1 OINTMENT TOPICAL at 19:41

## 2025-03-27 RX ADMIN — PAROXETINE HYDROCHLORIDE 20 MG: 20 TABLET, FILM COATED ORAL at 08:19

## 2025-03-27 RX ADMIN — Medication 1000 UNITS: at 08:19

## 2025-03-27 RX ADMIN — HEPARIN, PORCINE (PF) 10 UNIT/ML INTRAVENOUS SYRINGE 5 ML: at 15:39

## 2025-03-27 RX ADMIN — HEPARIN, PORCINE (PF) 10 UNIT/ML INTRAVENOUS SYRINGE 5 ML: at 21:56

## 2025-03-27 RX ADMIN — SUCRALFATE ORAL 1 G: 1 SUSPENSION ORAL at 12:46

## 2025-03-27 RX ADMIN — TRIAMCINOLONE ACETONIDE: 1 OINTMENT TOPICAL at 08:22

## 2025-03-27 RX ADMIN — PANTOPRAZOLE SODIUM 40 MG: 40 TABLET, DELAYED RELEASE ORAL at 08:19

## 2025-03-27 RX ADMIN — ACETAMINOPHEN 650 MG: 325 TABLET, FILM COATED ORAL at 22:21

## 2025-03-27 RX ADMIN — SUCRALFATE ORAL 1 G: 1 SUSPENSION ORAL at 08:18

## 2025-03-27 RX ADMIN — ACYCLOVIR 400 MG: 400 TABLET ORAL at 19:41

## 2025-03-27 RX ADMIN — LEVOFLOXACIN 500 MG: 500 TABLET, FILM COATED ORAL at 08:19

## 2025-03-27 RX ADMIN — PANTOPRAZOLE SODIUM 40 MG: 40 TABLET, DELAYED RELEASE ORAL at 17:27

## 2025-03-27 RX ADMIN — POTASSIUM CHLORIDE 40 MEQ: 750 TABLET, EXTENDED RELEASE ORAL at 12:47

## 2025-03-27 RX ADMIN — FAMOTIDINE 20 MG: 20 TABLET, FILM COATED ORAL at 19:41

## 2025-03-27 RX ADMIN — SUCRALFATE ORAL 1 G: 1 SUSPENSION ORAL at 17:27

## 2025-03-27 RX ADMIN — HYDROXYZINE HYDROCHLORIDE 25 MG: 25 TABLET, FILM COATED ORAL at 22:21

## 2025-03-27 RX ADMIN — ACETAMINOPHEN 650 MG: 325 TABLET, FILM COATED ORAL at 12:47

## 2025-03-27 RX ADMIN — LORATADINE 10 MG: 10 TABLET ORAL at 08:24

## 2025-03-27 RX ADMIN — HEPARIN, PORCINE (PF) 10 UNIT/ML INTRAVENOUS SYRINGE 5 ML: at 17:29

## 2025-03-27 RX ADMIN — FLUTICASONE FUROATE AND VILANTEROL TRIFENATATE 1 PUFF: 100; 25 POWDER RESPIRATORY (INHALATION) at 08:19

## 2025-03-27 RX ADMIN — SUCRALFATE ORAL 1 G: 1 SUSPENSION ORAL at 21:56

## 2025-03-27 RX ADMIN — ACYCLOVIR 400 MG: 400 TABLET ORAL at 08:18

## 2025-03-27 RX ADMIN — Medication 5 ML: at 05:00

## 2025-03-27 RX ADMIN — FAMOTIDINE 20 MG: 20 TABLET, FILM COATED ORAL at 08:18

## 2025-03-27 RX ADMIN — POSACONAZOLE 300 MG: 100 TABLET, DELAYED RELEASE ORAL at 08:19

## 2025-03-27 ASSESSMENT — ACTIVITIES OF DAILY LIVING (ADL)
ADLS_ACUITY_SCORE: 31
ADLS_ACUITY_SCORE: 33
ADLS_ACUITY_SCORE: 31
ADLS_ACUITY_SCORE: 33
ADLS_ACUITY_SCORE: 31
ADLS_ACUITY_SCORE: 31
ADLS_ACUITY_SCORE: 33
ADLS_ACUITY_SCORE: 31
ADLS_ACUITY_SCORE: 31
ADLS_ACUITY_SCORE: 33
ADLS_ACUITY_SCORE: 31
ADLS_ACUITY_SCORE: 33
ADLS_ACUITY_SCORE: 33
ADLS_ACUITY_SCORE: 31
ADLS_ACUITY_SCORE: 33
ADLS_ACUITY_SCORE: 33
ADLS_ACUITY_SCORE: 31
ADLS_ACUITY_SCORE: 31
ADLS_ACUITY_SCORE: 33

## 2025-03-27 NOTE — PROGRESS NOTES
[facility]    Progress Note  Hematology / Oncology     Date of Admission:  2/26/2025  Hospital Day #: 29   Date of Service (when I saw the patient): 03/27/2025    Assessment & Plan   Alejandra Villegas is a 59 year old female with a past medical history significant for COPD, migraines, rheumatoid arthritis, aortic stenosis, Afib, and anxiety who presented to an outside hospital with cytopenias and was found on BMBx to have hypercellular marrow with 4% blasts, 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 She was subsequently admitted to [facility] on 2/26/25 for further work-up and management and pathology re-review was most consistent with AML with NPM1 mutation by WHO 2022 (which does not require a specific blast threshold). Following further multidisciplinary discussion, she started intensive induction with 7+3 (D1=3/5/25). Her course has been complicated by neutropenic fevers, epigastric pain, influenza A, AOM, and L TM perforation.    TODAY:  - D23 7+3 induction.   - Tentatively scheduled for a restaging BMBx on 3/31 at 11 AM; can adjust PRN pending count recovery.  - Continue best supportive cares.    HEME  # AML with NPM1 mutation  Had been seen by PCP Dec 2024 w/ progressive fatigue and nausea where she was found leukopenic WBC  2.4 and neutropenic w/ ANC 0.3. Hgb 12. Was referred to local hematology/oncology clinic for further evaluation and seen by Dr. Samina Harris. BMBx 2/10/25 done at OSH showed hypercellular marrow w/ trilineage hematopoiesis w/ dyspoiesis with mildly elevated blasts of 4% on morphology. NGS w/ NPM1, IDH2, and NRAS mutations. She was admitted to [facility] 2/26/25 for further workup and management. Slides were re-reviewed by [facility] Hematopathology, who noted hypercellular marrow with 8% blasts by morphology. Despite relatively low blast percentage, findings were felt most consistent with a diagnosis AML with NPM1 mutation by WHO 2022  (which does not require a specific blast threshold). Following interdepartmental discussions and review of the available literature, patient was started on intensive induction with 7+3 (Day 1=3/5/25).   - PICC placed 3/5/2025, plan to discontinue on discharge.   - Baseline cardiopulmonary studies:  - Echo w/ EF 60-65%, mild known aortic stenosis.   - EKG (2/27) with NSR, QTc 412  - CXR (2/26) with mildly increased streaky bibasilar opacities, atelectasis or edema. No consolidation.   - Baseline viral serologies: CMV IgG+, EBV IgG+, HepB sAg-, HepB cAb-, HepB sAb-, HSV1+/2+, HIV-  - HLA Typing sent on admission. Message sent to notify BMT team x2/27 for IP consult.   - Midcycle BMBx 3/19/25: Flow with no increase in myeloid blasts and no abnormal myeloid blast population. Morphology with no morphologic or immunophenotypic evidence of acute myeloid leukemia.                 Treatment plan: 7+3 (C1D1=3/5/2025)  - Daunorubicin 60 mg/m  IV D1-3  - Cytarabine 100 mg/m  IV D1-7    Supportive medications: Zofran, dexamethasone 12 mg D1-3     # Pancytopenia  Secondary to underlying hematologic malignancy and exacerbated by recent chemotherapy.  - Follow daily CBC with differential  - Transfuse to keep Hgb >7, plt >10K  - Blood consent obtained 2/26/25 on admission and placed in patient's paper chart.      ID  # Neutropenic fever, resolved  # Influenza A, resolved  Episode #1 of neutropenic fever:  2/25: On admission, patient noted subjective fever with chills and rhinitis beginning 2/25 PM prior to admission. No other localizing s/sx of infection. She was afebrile on admission but spiked a low-grade fever to 100.7 on 2/26 PM. Blood and urine cultures negative, CXR with streaky opacities but no burt consolidation. Work-up positive for influenza A, and she completed a course of Tamiflu 75 mg BID x5 days (2/26-3/3). She also received a short empiric course of IV cefepime (2/26-3/1) given concurrent neutropenia.  Episode  #2:  3/13: Febrile to 100.4 overnight x3/13. No localizing/new symptoms. OVSS. Patient was wearing heated jacket that she attributed to temperature, no documentation noted of notifying cross cover MD. No further ID workup (BCx, UA, UC, or CXR) completed and no antibiotic changes made. Given borderline temperature, decision made to continue monitoring and no further work-up was undertaken.  Episode #3:  3/19: Tmax 100.6 overnight. Asymptomatic, although notes sweating upon waking the following morning. Started IV Cefepime 2 g q8h (3/18-X). CXR with scattered patchy densities, especially in the lung bases. Respiratory panel negative. UA with protein and blood but otherwise bland, UCx negative. BCx NGTD. A CT C/A/P was done (for other reasons, as below) and notable for findings suggestive of esophagitis as well as possible bronchitis/small airways disease. She was treated with a 7-day course of IV cefepime (3/18-3/25) and fever did not recur.    # ID prophylaxis  - Acyclovir 400 mg BID  - Levaquin 500 mg daily  - Posaconazole 300 mg daily   - Vori w/ $1.60 copay, posa requiring PA also w/ $1.60 copay. Rotated from deny to vori (3/13-3/17) w/ completion of chemotherapy, then transitioned to posa x3/18 given visual hallucinations x3/17     GI  # GERD, improved  # Epigastric pain, resolved  Reported recent increase in symptoms in the days leading up to admission and initiated famotidine. Persistent/progressive symptoms throughout admission. Cardiac work-up reassuring, lipase negative. Increased H2B to BID (3/9); given persistent symptoms, PPI started (3/14). Increased to BID (3/19). CT C/A/P with evidence of esophagitis (thought due to reflux/recent chemotherapy) and small hiatal hernia. Symptoms have improved on maximal medical therapy as below.  - Continue famotidine 20 mg BID  - Continue Protonix 40 mg BID  - GI cocktail PRN  - TUMS PRN  - GI consulted, EGD deferred for now as risks thought to outweigh the benefits in  the setting of neutropenia; can reassess upon count recovery  - Continue to encourage lifestyle modifications: avoid straws, carbonated beverages, GERD precautions    # Risk of malnutrition  Secondary to esophagitis/GERD/mucositis picture as above, patient intake has decreased to small servings, mostly eating soup. On regular adult diet with thin liquids per SLP. Recommended trial of high caloric liquids, patient tried Strawberry Ensure and plans to continue to order with meals.  - RD following; appreciate recs     # Nausea/vomiting, improving  Patient noted ongoing nausea w/ occasional vomiting starting Dec 2024. Has been managed with PRN Zofran.  Endorsed nausea on admission, now resolved.   - PRN Zofran and compazine  - Could consider trial of PRN Zyprexa if 3rd agent is desired/required     PULM  # COPD  Longstanding history of COPD. On admission patient reports symptoms are manageable with no recent exacerbations. Most recent PFTs (2018) were normal.  - Continue PTA Breo Ellipta inhaler and PRN DuoNebs      CV  # Essential hypertension  - Continue PTA lisinopril - HELD 3/11 w/ soft BPs (90s-100s/50s-60s), resume pending daily trends    # Infrarenal abdominal aortic aneurysm  Noted incidentally on CT C/A/P (3/19/25), measuring 3.4 cm in diameter.  - Attention on follow-up imaging    RENAL/FEN  # Stage 2 CKD  Baseline Cr ~ 0.7. On admission Cr 0.77.  - Continue to trend on daily labs and encourage PO hydration     NEURO  # Chronic migraine w/o aura  # Chronic headaches  Present since childhood. Reportedly triggered by neck manipulation/movement. Reportedly well-managed with regimen below. Headaches occurring throughout admission with daily APAP use, also chronic and managed with APAP PRN at home. Patient notes that her headaches throughout admission have been consistent with her typical daily headaches with no reported changes in character, quality, location, severity, or frequency. She denies new associated  neurological changes. Considered LP to exclude CNS leukemia, but given this reassuring history and absence of other indications for LP (no hyperleukocytosis, no monocytic phenotype, no FLT3 mutation, etc), this was ultimately deferred.  - APAP PRN  - Continue PTA sumatriptan and cyclobenzaprine PRN  - Could reconsider need for LP if headache worsens/changes in character or quality in the future    MISC  # Maculopapular rash  Noted on 3/25. Pruritic, localized to the neck, right shoulder, and flexural area behind the right knee. Clinically, affected areas consist of coalescing erythematous macules with scattered papules. No obvious vesicles or bullae. DDx includes contact dermatitis (favored, possibly due to CHG wipes) versus atopic dermatitis (flexural areas raise suspicion for this) versus drug eruption versus other. Improved as of 3/26 with triamcinolone ointment.  - Continue triamcinolone ointment 0.1% BID to the affected areas  - Okay to defer CHG wipes given concern for contact dermatitis  - Monitor clinically    # RBOOKS  # MDD  # At risk for adjustment disorder  Patient reports good control of anxiety/depressive symptoms prior to admission. Did note feeling overwhelmed by new diagnosis. Offered health psychology or cordelia, which she declined.  - Readdress health psych consult as indicated  - Continue PTA paroxetine   - Atarax as needed     # Tobacco use disorder  Has smoked since age 16, 1.5 ppd (roughly 65 pack years). Attempting to cut back as recently as 01/2025 to 0.5 ppd. We discussed the risks of smoking during induction chemotherapy including increased risk for infection in setting of severe neutropenia that could further complicate course, placing her at risk for severe complications that could be life-threatening or even fatal.  - Encourage smoking cessation; patient continues to smoke at this time, despite understanding/acknowledgement of the risks.  - Trialed nicotine patch, then self-discontinued  after reporting that she felt the nicotine patch precipitated an anxiety attack/episode of chest pain on 3/9. Offered a lower dose versus alternative form of NRT, which patient declined.      Chronic Problems:  # Vitamin D deficiency - Continue PTA vit D supplement  # Seasonal allergies - Claritin 10 mg daily  # Rheumatoid arthritis - Patient reported trying treatment though was unable to tolerate well. Managed with Tylenol PRN. Most recent RF >650 (2/10/25). JI negative.    # Prediabetes - Last A1c 6.0 x12/9/24. Has been managing with lifestyle modifications.   # Degenerative disc disease, L5-S1 - Continue PTA gabapentin PRN  # Mixed hyperlipidemia - Lipid panel has remained at goal, 1/14/25 panel w/ cholesterol 163, , LDL 92, HDL 45.   - Held PTA atorvastatin on admission due to chemotherapy interactions. Consider rotation to rosuvastatin for better drug-drug interaction profile.   # H/o aortic stenosis - Patient noted on admission longstanding history of aortic stenosis. Pre-chemo echo w/ EF 60-65% and mild aortic stenosis and insufficiency. No previous echo to compare.     Resolved Hospital Problems:  # Mild hyponatremia, resolved - New mild hyponatremia noted 3/13 with Na 135 ? 130. Asymptomatic. Now resolved.  # Non-radiating chest pain, resolved - for details see note from 3/21 and prior   # Urinary frequency + Dysuria, resolved - On admission, patient noted longstanding history of urinary frequency though new mild dysuria. UA (2/26) negative, UC with no growth. Symptoms have since resolved.  # R AOM with purulent effusion and Small L TM perforation, resolved - ENT consulted, appreciate recs. Completed 7-day course Levaquin 750mg daily and 5 day course of floxin drops to left ear for perforation     Clinically Significant Risk Factors               # Hypoalbuminemia: Lowest albumin = 3.2 g/dL at 3/24/2025  4:59 AM, will monitor as appropriate     # Hypertension: Noted on problem list            #  "Overweight: Estimated body mass index is 29.78 kg/m  as calculated from the following:    Height as of this encounter: 1.626 m (5' 4\").    Weight as of this encounter: 78.7 kg (173 lb 8 oz).        # Financial/Environmental Concerns: none  # Asthma: noted on problem list       Fluids/Electrolytes/Nutrition   - IVF bolus PRN   - PRN lyte replacement per standing protocol  - Regular diet as tolerated      Lines: PICC     PPX  VTE: TCP, Lovenox held 3/13  GI: Pepcid, Protonix  Bowels: prn senna and miralax  Activity: as tolerated      Code Status: DNR/DNI    Disposition: Inpatient admission to [facility]e Malign[facility] service for treatment of AML; discharge to home pending count recovery and restaging BMBx, likely 7-10 more days.   Follow-up: Will follow with Dr. Harris as primary oncologist and go to [facility] for as needed labs/transfusions.      Medically Ready for Discharge: Anticipated in 5+ Days      Staffed with Dr. Jaimes.    I spent >45 minutes in the care of this patient today, which included time necessary for review of interval events, obtaining history and physical exam, ordering medication(s)/test(s) as medically indicated, discussion with interdisciplinary/consult team(s), care coordination, and documentation time.     Andrey Crowell PA-C  Hematology/Oncology  Pager [phone]    Interval History   No acute overnight events. Nursing notes reviewed. Afebrile and hemodynamically stable.    Alejandra continues to feel well today. Discussed morning labs with improvement of counts. Reviewed upcoming bone marrow biopsy that will guide discharge timeline. Endorses better sleep last night. Appetite and epigastric/reflux picture are improved and have been stable. Denies chest pain, shortness of breath, headache, nausea/vomiting/diarrhea, abdominal pain, peripheral edema. Rash nearly resolved today. All concerns were addressed and questions were answered.    A comprehensive review of symptoms was performed and " was negative except as detailed in the interval history above.    Physical Exam   Vital Signs with Ranges  Temp:  [98.3  F (36.8  C)-99.2  F (37.3  C)] 98.9  F (37.2  C)  Pulse:  [83-90] 83  Resp:  [18-20] 18  BP: (123-140)/(70-80) 136/76  SpO2:  [91 %-97 %] 93 %    I/O last 3 completed shifts:  In: 480 [P.O.:480]  Out: 1075 [Urine:1075]    Vitals:    03/25/25 1000 03/26/25 0923 03/27/25 0826   Weight: 79.3 kg (174 lb 12.8 oz) 78.6 kg (173 lb 4.8 oz) 78.7 kg (173 lb 8 oz)     Constitutional: Pleasant and cooperative female, in NAD. Alert, interactive, non-toxic. Smiling and conversational.  HEENT: NC/AT, sclera clear, conjunctiva normal, OP with MMM, no discrete intraoral lesions or thrush, poor dentition.  Respiratory: No increased work of breathing, no crackles or wheezing. Breath sounds mildly diminished to the lung bases bilaterally.  Cardiovascular: RRR, systolic murmur. No peripheral edema. No calf tenderness or palpable cords.  GI: Normal bowel sounds. Abdomen with central obesity, is soft, non-distended and non-tender to palpation.  Skin: Warm, dry, well-perfused. Patchy erythematous macules/patches with few scattered papula noted to the base of the neck, right shoulder, and flexural area of the right knee. No vesicles. No warmth, tenderness, or crepitus.   Musculoskeletal: Diminished muscle bulk, no gross deformities.  Neurologic: A&O. Answers questions appropriately, speech normal. Moves all extremities spontaneously.  Psychiatric: Normal mood and affect.  Vascular access:  PICC on RUE CDI, non-tender, no surrounding erythema.    Medications   Current Facility-Administered Medications   Medication Dose Route Frequency Provider Last Rate Last Admin     Current Facility-Administered Medications   Medication Dose Route Frequency Provider Last Rate Last Admin    acyclovir (ZOVIRAX) tablet 400 mg  400 mg Oral BID Chelsie Murphy PA-C   400 mg at 03/27/25 0818    famotidine (PEPCID) tablet 20 mg  20  mg Oral BID Zully Garcia PA-C   20 mg at 03/27/25 0818    fluticasone-vilanterol (BREO ELLIPTA) 100-25 MCG/ACT inhaler 1 puff  1 puff Inhalation Daily Chelsie Murphy PA-C   1 puff at 03/27/25 0819    heparin lock flush 10 unit/mL injection 5-20 mL  5-20 mL Intracatheter Q24H Chelsie Murphy PA-C   5 mL at 03/27/25 0500    levofloxacin (LEVAQUIN) tablet 500 mg  500 mg Oral Daily Chelsie Davila PA-C   500 mg at 03/27/25 0819    [Held by provider] lisinopril (ZESTRIL) tablet 10 mg  10 mg Oral Daily Jyoti Tenorio PA-C   10 mg at 03/10/25 1039    loratadine (CLARITIN) tablet 10 mg  10 mg Oral Daily Chelsie Murphy PA-C   10 mg at 03/27/25 0824    pantoprazole (PROTONIX) EC tablet 40 mg  40 mg Oral BID AC Nery Crowell PA-C   40 mg at 03/27/25 0819    PARoxetine (PAXIL) tablet 20 mg  20 mg Oral PEDRO Chelsie Murphy PA-C   20 mg at 03/27/25 0819    posaconazole (NOXAFIL) DR tablet TBEC 300 mg  300 mg Oral Nery Dubose PA-C   300 mg at 03/27/25 0819    sodium chloride (PF) 0.9% PF flush 10-40 mL  10-40 mL Intracatheter Q8H Chelsie Murphy PA-C   20 mL at 03/27/25 0500    sucralfate (CARAFATE) suspension 1 g  1 g Oral 4x Daily AC & HS Aman Cervantes MD   1 g at 03/27/25 0818    triamcinolone (KENALOG) 0.1 % ointment   Topical BID Chelsie Davila PA-C   Given at 03/27/25 0822    Vitamin D3 (CHOLECALCIFEROL) tablet 1,000 Units  1,000 Units Oral Daily Chelsie Murphy PA-C   1,000 Units at 03/27/25 0819     Antiinfectives  Anti-infectives (From now, onward)      Start     Dose/Rate Route Frequency Ordered Stop    03/25/25 2000  levofloxacin (LEVAQUIN) tablet 500 mg         500 mg Oral DAILY 03/24/25 0819      03/18/25 0800  posaconazole (NOXAFIL) DR tablet TBEC 300 mg         300 mg Oral EVERY MORNING 03/17/25 1242      02/26/25 2000  acyclovir (ZOVIRAX) tablet 400 mg         400 mg Oral 2  TIMES DAILY 02/26/25 1307            Data   CBC   Recent Labs   Lab 03/27/25  0500 03/26/25  0541 03/25/25  0407 03/24/25  0459   WBC 1.6* 1.0* 0.7* 0.7*   RBC 2.59* 2.59* 2.07* 2.17*   HGB 8.2* 8.1* 6.6* 7.1*   HCT 24.1* 22.8* 18.7* 19.8*   MCV 93 88 90 91   MCH 31.7 31.3 31.9 32.7   MCHC 34.0 35.5 35.3 35.9   RDW 14.7 14.8 14.4 14.6    161 65* 43*     CMP   Recent Labs   Lab 03/27/25  0500 03/26/25  0541 03/25/25  1904 03/25/25  0407 03/24/25  0459    141  --  140 139   POTASSIUM 3.4 3.7 3.8 3.3* 3.7   CHLORIDE 104 105  --  105 106   CO2 25 26 -- 24 24   ANIONGAP 11 10  --  11 9   * 113*  --  114* 112*   BUN 10.3 12.1  --  14.2 15.1   CR 0.57 0.61  --  0.58 0.57   GFRESTIMATED >90 >90  --  >90 >90   TOBIN 8.9 8.9  --  8.5* 8.5*   MAG 2.2 2.4*  --  2.0 2.0   PHOS 3.5 3.0  --  3.6 3.3   PROTTOTAL 6.3* 6.4  --  6.0* 5.8*   ALBUMIN 3.4* 3.4*  --  3.3* 3.2*   BILITOTAL 0.2 0.3  --  0.2 0.3   ALKPHOS 118 117  --  111 107   AST 13 12  --  13 9   ALT 11 10  --  9 8     LFTs   Recent Labs   Lab 03/27/25  0500   PROTTOTAL 6.3*   ALBUMIN 3.4*   BILITOTAL 0.2   ALKPHOS 118   AST 13   ALT 11     Coagulation Studies   Recent Labs   Lab 03/26/25  0541   INR 1.11   PTT 29

## 2025-03-27 NOTE — PLAN OF CARE
Goal Outcome Evaluation:      Plan of Care Reviewed With: patient    Overall Patient Progress: improvingOverall Patient Progress: improving    Outcome Evaluation: Cycle 1 Day 23 7+3 chemo    Patient feeling well today & happy that her sister brought her snacks & cigarettes. Tylenol for headache x1 with relief. BmBx scheduled for Monday 3/31 at 11am. Patient to likely discharge Tuesday afternoon once BmBx has resulted. Potassium replaced.

## 2025-03-27 NOTE — PLAN OF CARE
19:00- 07:00  Goal Outcome Evaluation: no acute issues during shift      Plan of Care Reviewed With: patient  Overall Patient Progress: improving     C1D23 7+3     Patient AOx4, AVSS on RA. Denies nausea, SOB and pain. Voiding spontaneously w/ good UO. LBM 3/26. BMBx scheduled on 03/31 at 11am. UAL. Patient went out to smoke 3x last night. PICC heplocked. Triamcinolone cream used for rash. Continue w/ POC.

## 2025-03-28 VITALS
DIASTOLIC BLOOD PRESSURE: 64 MMHG | SYSTOLIC BLOOD PRESSURE: 111 MMHG | WEIGHT: 173.5 LBS | RESPIRATION RATE: 18 BRPM | OXYGEN SATURATION: 90 % | HEART RATE: 88 BPM | HEIGHT: 64 IN | TEMPERATURE: 98.6 F | BODY MASS INDEX: 29.62 KG/M2

## 2025-03-28 LAB
ABO + RH BLD: NORMAL
ALBUMIN SERPL BCG-MCNC: 3.4 G/DL (ref 3.5–5.2)
ALP SERPL-CCNC: 116 U/L (ref 40–150)
ALT SERPL W P-5'-P-CCNC: 10 U/L (ref 0–50)
ANION GAP SERPL CALCULATED.3IONS-SCNC: 10 MMOL/L (ref 7–15)
APTT PPP: 31 SECONDS (ref 22–38)
AST SERPL W P-5'-P-CCNC: 14 U/L (ref 0–45)
BASOPHILS # BLD AUTO: 0 10E3/UL (ref 0–0.2)
BASOPHILS NFR BLD AUTO: 1 %
BILIRUB SERPL-MCNC: 0.2 MG/DL
BLD GP AB SCN SERPL QL: NEGATIVE
BUN SERPL-MCNC: 7.5 MG/DL (ref 6–20)
CALCIUM SERPL-MCNC: 8.7 MG/DL (ref 8.8–10.4)
CHLORIDE SERPL-SCNC: 107 MMOL/L (ref 98–107)
CREAT SERPL-MCNC: 0.58 MG/DL (ref 0.51–0.95)
EGFRCR SERPLBLD CKD-EPI 2021: >90 ML/MIN/1.73M2
EOSINOPHIL # BLD AUTO: 0 10E3/UL (ref 0–0.7)
EOSINOPHIL NFR BLD AUTO: 0 %
ERYTHROCYTE [DISTWIDTH] IN BLOOD BY AUTOMATED COUNT: 14.7 % (ref 10–15)
FIBRINOGEN PPP-MCNC: 597 MG/DL (ref 170–510)
GLUCOSE SERPL-MCNC: 114 MG/DL (ref 70–99)
HCO3 SERPL-SCNC: 25 MMOL/L (ref 22–29)
HCT VFR BLD AUTO: 23 % (ref 35–47)
HGB BLD-MCNC: 7.7 G/DL (ref 11.7–15.7)
IMM GRANULOCYTES # BLD: 0 10E3/UL
IMM GRANULOCYTES NFR BLD: 1 %
INR PPP: 1.19 (ref 0.85–1.15)
LDH SERPL L TO P-CCNC: 266 U/L (ref 0–250)
LYMPHOCYTES # BLD AUTO: 0.7 10E3/UL (ref 0.8–5.3)
LYMPHOCYTES NFR BLD AUTO: 37 %
MAGNESIUM SERPL-MCNC: 2.2 MG/DL (ref 1.7–2.3)
MCH RBC QN AUTO: 30.3 PG (ref 26.5–33)
MCHC RBC AUTO-ENTMCNC: 33.5 G/DL (ref 31.5–36.5)
MCV RBC AUTO: 91 FL (ref 78–100)
MONOCYTES # BLD AUTO: 0.4 10E3/UL (ref 0–1.3)
MONOCYTES NFR BLD AUTO: 22 %
NEUTROPHILS # BLD AUTO: 0.8 10E3/UL (ref 1.6–8.3)
NEUTROPHILS NFR BLD AUTO: 40 %
NRBC # BLD AUTO: 0 10E3/UL
NRBC BLD AUTO-RTO: 0 /100
PHOSPHATE SERPL-MCNC: 3.8 MG/DL (ref 2.5–4.5)
PLATELET # BLD AUTO: 507 10E3/UL (ref 150–450)
POTASSIUM SERPL-SCNC: 3.5 MMOL/L (ref 3.4–5.3)
PROT SERPL-MCNC: 6.3 G/DL (ref 6.4–8.3)
RBC # BLD AUTO: 2.54 10E6/UL (ref 3.8–5.2)
SODIUM SERPL-SCNC: 142 MMOL/L (ref 135–145)
SPECIMEN EXP DATE BLD: NORMAL
URATE SERPL-MCNC: 2.6 MG/DL (ref 2.4–5.7)
WBC # BLD AUTO: 2 10E3/UL (ref 4–11)

## 2025-03-28 PROCEDURE — 250N000011 HC RX IP 250 OP 636

## 2025-03-28 PROCEDURE — 84132 ASSAY OF SERUM POTASSIUM: CPT

## 2025-03-28 PROCEDURE — 83735 ASSAY OF MAGNESIUM: CPT

## 2025-03-28 PROCEDURE — 250N000013 HC RX MED GY IP 250 OP 250 PS 637

## 2025-03-28 PROCEDURE — 120N000002 HC R&B MED SURG/OB UMMC

## 2025-03-28 PROCEDURE — 84155 ASSAY OF PROTEIN SERUM: CPT

## 2025-03-28 PROCEDURE — 250N000013 HC RX MED GY IP 250 OP 250 PS 637: Performed by: PHYSICIAN ASSISTANT

## 2025-03-28 PROCEDURE — 85004 AUTOMATED DIFF WBC COUNT: CPT

## 2025-03-28 PROCEDURE — 85730 THROMBOPLASTIN TIME PARTIAL: CPT | Performed by: PHYSICIAN ASSISTANT

## 2025-03-28 PROCEDURE — 83615 LACTATE (LD) (LDH) ENZYME: CPT

## 2025-03-28 PROCEDURE — 85384 FIBRINOGEN ACTIVITY: CPT | Performed by: PHYSICIAN ASSISTANT

## 2025-03-28 PROCEDURE — 86900 BLOOD TYPING SEROLOGIC ABO: CPT

## 2025-03-28 PROCEDURE — 99233 SBSQ HOSP IP/OBS HIGH 50: CPT | Mod: FS

## 2025-03-28 PROCEDURE — 250N000013 HC RX MED GY IP 250 OP 250 PS 637: Performed by: HOSPITALIST

## 2025-03-28 PROCEDURE — 85610 PROTHROMBIN TIME: CPT | Performed by: PHYSICIAN ASSISTANT

## 2025-03-28 PROCEDURE — 84100 ASSAY OF PHOSPHORUS: CPT | Performed by: PHYSICIAN ASSISTANT

## 2025-03-28 PROCEDURE — 84550 ASSAY OF BLOOD/URIC ACID: CPT | Performed by: PHYSICIAN ASSISTANT

## 2025-03-28 RX ORDER — ENOXAPARIN SODIUM 100 MG/ML
40 INJECTION SUBCUTANEOUS EVERY 24 HOURS
Status: DISCONTINUED | OUTPATIENT
Start: 2025-03-28 | End: 2025-04-06 | Stop reason: HOSPADM

## 2025-03-28 RX ADMIN — POSACONAZOLE 300 MG: 100 TABLET, DELAYED RELEASE ORAL at 10:34

## 2025-03-28 RX ADMIN — FLUTICASONE FUROATE AND VILANTEROL TRIFENATATE 1 PUFF: 100; 25 POWDER RESPIRATORY (INHALATION) at 10:35

## 2025-03-28 RX ADMIN — FAMOTIDINE 20 MG: 20 TABLET, FILM COATED ORAL at 21:09

## 2025-03-28 RX ADMIN — TRIAMCINOLONE ACETONIDE: 1 OINTMENT TOPICAL at 10:35

## 2025-03-28 RX ADMIN — ACYCLOVIR 400 MG: 400 TABLET ORAL at 21:09

## 2025-03-28 RX ADMIN — ACETAMINOPHEN 650 MG: 325 TABLET, FILM COATED ORAL at 20:01

## 2025-03-28 RX ADMIN — Medication 5 ML: at 04:47

## 2025-03-28 RX ADMIN — PAROXETINE HYDROCHLORIDE 20 MG: 20 TABLET, FILM COATED ORAL at 10:34

## 2025-03-28 RX ADMIN — LORATADINE 10 MG: 10 TABLET ORAL at 10:34

## 2025-03-28 RX ADMIN — HYDROXYZINE HYDROCHLORIDE 25 MG: 25 TABLET, FILM COATED ORAL at 20:01

## 2025-03-28 RX ADMIN — FAMOTIDINE 20 MG: 20 TABLET, FILM COATED ORAL at 10:34

## 2025-03-28 RX ADMIN — HYDROXYZINE HYDROCHLORIDE 25 MG: 25 TABLET, FILM COATED ORAL at 12:41

## 2025-03-28 RX ADMIN — Medication 1000 UNITS: at 10:34

## 2025-03-28 RX ADMIN — SUCRALFATE ORAL 1 G: 1 SUSPENSION ORAL at 21:09

## 2025-03-28 RX ADMIN — ENOXAPARIN SODIUM 40 MG: 40 INJECTION SUBCUTANEOUS at 21:09

## 2025-03-28 RX ADMIN — SUCRALFATE ORAL 1 G: 1 SUSPENSION ORAL at 17:37

## 2025-03-28 RX ADMIN — ACYCLOVIR 400 MG: 400 TABLET ORAL at 10:34

## 2025-03-28 RX ADMIN — HEPARIN, PORCINE (PF) 10 UNIT/ML INTRAVENOUS SYRINGE 5 ML: at 04:46

## 2025-03-28 RX ADMIN — PANTOPRAZOLE SODIUM 40 MG: 40 TABLET, DELAYED RELEASE ORAL at 17:37

## 2025-03-28 RX ADMIN — ACETAMINOPHEN 650 MG: 325 TABLET, FILM COATED ORAL at 12:41

## 2025-03-28 RX ADMIN — PANTOPRAZOLE SODIUM 40 MG: 40 TABLET, DELAYED RELEASE ORAL at 10:34

## 2025-03-28 RX ADMIN — SUCRALFATE ORAL 1 G: 1 SUSPENSION ORAL at 10:34

## 2025-03-28 RX ADMIN — LEVOFLOXACIN 500 MG: 500 TABLET, FILM COATED ORAL at 10:34

## 2025-03-28 ASSESSMENT — ACTIVITIES OF DAILY LIVING (ADL)
ADLS_ACUITY_SCORE: 35
ADLS_ACUITY_SCORE: 33
ADLS_ACUITY_SCORE: 35
ADLS_ACUITY_SCORE: 33

## 2025-03-28 NOTE — PLAN OF CARE
"Goal Outcome Evaluation:      Plan of Care Reviewed With: patient    Overall Patient Progress: improvingOverall Patient Progress: improving       Time    /67   Pulse 84   Temp 99.1  F (37.3  C)   Resp 20   Ht 1.626 m (5' 4\")   Wt 78.7 kg (173 lb 8 oz)   LMP 01/01/2007 (Approximate)   SpO2 92%   BMI 29.78 kg/m      Reason for admission: AML  Activity: UAL  Pain: Pt denies any pain nausea and vomiting  Neuro: A&O X4  Cardiac: WNL  Respiratory: WNL  GI/: Voiding urine spontaneously and passing gas  Diet: Regular diet  Lines: PICC        Continue to monitor and follow POC      "

## 2025-03-28 NOTE — PROGRESS NOTES
[facility]    Progress Note  Hematology / Oncology     Date of Admission:  2/26/2025  Hospital Day #: 30   Date of Service (when I saw the patient): 03/28/2025    Assessment & Plan   Alejandra Villegas is a 59 year old female with a past medical history significant for COPD, migraines, rheumatoid arthritis, aortic stenosis, Afib, and anxiety who presented to an outside hospital with cytopenias and was found on BMBx to have hypercellular marrow with 4% blasts, 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 She was subsequently admitted to [facility] on 2/26/25 for further work-up and management and pathology re-review was most consistent with AML with NPM1 mutation by WHO 2022 (which does not require a specific blast threshold). Following further multidisciplinary discussion, she started intensive induction with 7+3 (D1=3/5/25). Her course has been complicated by neutropenic fevers, epigastric pain, influenza A, AOM, and L TM perforation.    TODAY:  - D24 7+3 induction.   - Scheduled for a restaging BMBx on 3/31 at 11 AM. Pending BMBx results, plan to proceed with consolidation prior to discharge.  - Continue best supportive cares.    HEME  # AML with NPM1 mutation  Had been seen by PCP Dec 2024 w/ progressive fatigue and nausea where she was found leukopenic WBC  2.4 and neutropenic w/ ANC 0.3. Hgb 12. Was referred to local hematology/oncology clinic for further evaluation and seen by Dr. Samina Harris. BMBx 2/10/25 done at [facility] showed hypercellular marrow w/ trilineage hematopoiesis w/ dyspoiesis with mildly elevated blasts of 4% on morphology. NGS w/ NPM1, IDH2, and NRAS mutations. She was admitted to [facility] 2/26/25 for further workup and management. Slides were re-reviewed by [facility] Hematopathology, who noted hypercellular marrow with 8% blasts by morphology. Despite relatively low blast percentage, findings were felt most consistent with a diagnosis AML with  NPM1 mutation by WHO 2022 (which does not require a specific blast threshold). Following interdepartmental discussions and review of the available literature, patient was started on intensive induction with 7+3 (Day 1=3/5/25).   - PICC placed 3/5/2025, plan to discontinue on discharge.   - Baseline cardiopulmonary studies:  - Echo w/ EF 60-65%, mild known aortic stenosis.   - EKG (2/27) with NSR, QTc 412  - CXR (2/26) with mildly increased streaky bibasilar opacities, atelectasis or edema. No consolidation.   - Baseline viral serologies: CMV IgG+, EBV IgG+, HepB sAg-, HepB cAb-, HepB sAb-, HSV1+/2+, HIV-  - HLA Typing sent on admission. Message sent to notify BMT team x2/27 for IP consult.   - Midcycle BMBx 3/19/25: Flow with no increase in myeloid blasts and no abnormal myeloid blast population. Morphology with no morphologic or immunophenotypic evidence of acute myeloid leukemia.   - D28 BMBx scheduled 3/31/25 at 11 AM. Pending BMBx results, plan to proceed with consolidation prior to discharge.               Treatment plan: 7+3 (C1D1=3/5/2025)  - Daunorubicin 60 mg/m  IV D1-3  - Cytarabine 100 mg/m  IV D1-7    Supportive medications: Zofran, dexamethasone 12 mg D1-3     # Pancytopenia  Secondary to underlying hematologic malignancy and exacerbated by recent chemotherapy.  - Follow daily CBC with differential  - Transfuse to keep Hgb >7, plt >10K  - Blood consent obtained 2/26/25 on admission and placed in patient's paper chart.      ID  # Neutropenic fever, resolved  # Influenza A, resolved  Episode #1:  2/25: On admission, patient noted subjective fever with chills and rhinitis beginning 2/25 PM prior to admission. No other localizing s/sx of infection. She was afebrile on admission but spiked a low-grade fever to 100.7 on 2/26 PM. Blood and urine cultures negative, CXR with streaky opacities but no burt consolidation. Work-up positive for influenza A, and she completed a course of Tamiflu 75 mg BID x5 days  (2/26-3/3). She also received a short empiric course of IV cefepime (2/26-3/1) given concurrent neutropenia.  Episode #2:  3/13: Febrile to 100.4 overnight x3/13. No localizing/new symptoms. OVSS. Patient was wearing heated jacket that she attributed to temperature, no documentation noted of notifying cross cover MD. No further ID workup (BCx, UA, UC, or CXR) completed and no antibiotic changes made. Given borderline temperature, decision made to continue monitoring and no further work-up was undertaken.  Episode #3:  3/19: Tmax 100.6 overnight. Asymptomatic, although notes sweating upon waking the following morning. Started IV Cefepime 2 g q8h (3/18-X). CXR with scattered patchy densities, especially in the lung bases. Respiratory panel negative. UA with protein and blood but otherwise bland, UCx negative. BCx NGTD. A CT C/A/P was done (for other reasons, as below) and notable for findings suggestive of esophagitis as well as possible bronchitis/small airways disease. She was treated with a 7-day course of IV cefepime (3/18-3/25) and fever did not recur.    # ID prophylaxis  - Acyclovir 400 mg BID  - Levaquin 500 mg daily  - Posaconazole 300 mg daily   - Vori w/ $1.60 copay, posa requiring PA also w/ $1.60 copay. Rotated from deny to vori (3/13-3/17) w/ completion of chemotherapy, then transitioned to posa x3/18 given visual hallucinations x3/17     GI  # GERD, improved  # Epigastric pain, resolved  Reported recent increase in symptoms in the days leading up to admission and initiated famotidine. Persistent/progressive symptoms throughout admission. Cardiac work-up reassuring, lipase negative. Increased H2B to BID (3/9); given persistent symptoms, PPI started (3/14). Increased to BID (3/19). CT C/A/P with evidence of esophagitis (thought due to reflux/recent chemotherapy) and small hiatal hernia. Symptoms have improved on maximal medical therapy as below.  - Continue famotidine 20 mg BID  - Continue Protonix 40 mg  BID  - GI cocktail PRN  - TUMS PRN  - GI consulted, EGD deferred for now as risks thought to outweigh the benefits in the setting of neutropenia; can reassess upon count recovery  - Continue to encourage lifestyle modifications: avoid straws, carbonated beverages, GERD precautions    # Risk of malnutrition  Secondary to esophagitis/GERD/mucositis picture as above, patient intake has decreased to small servings, mostly eating soup. On regular adult diet with thin liquids per SLP. Recommended trial of high caloric liquids, patient tried Strawberry Ensure and plans to continue to order with meals.  - RD following; appreciate recs     # Nausea/vomiting, improving  Patient noted ongoing nausea w/ occasional vomiting starting Dec 2024. Has been managed with PRN Zofran.  Endorsed nausea on admission, now resolved.   - PRN Zofran and compazine  - Could consider trial of PRN Zyprexa if 3rd agent is desired/required     PULM  # COPD  Longstanding history of COPD. On admission patient reports symptoms are manageable with no recent exacerbations. Most recent PFTs (2018) were normal.  - Continue PTA Breo Ellipta inhaler and PRN DuoNebs      CV  # Essential hypertension  - Continue PTA lisinopril - HELD 3/11 w/ soft BPs (90s-100s/50s-60s), resume pending daily trends    # Infrarenal abdominal aortic aneurysm  Noted incidentally on CT C/A/P (3/19/25), measuring 3.4 cm in diameter.  - Attention on follow-up imaging    RENAL/FEN  # Stage 2 CKD  Baseline Cr ~ 0.7. On admission Cr 0.77.  - Continue to trend on daily labs and encourage PO hydration     NEURO  # Chronic migraine w/o aura  # Chronic headaches  Present since childhood. Reportedly triggered by neck manipulation/movement. Reportedly well-managed with regimen below. Headaches occurring throughout admission with daily APAP use, also chronic and managed with APAP PRN at home. Patient notes that her headaches throughout admission have been consistent with her typical daily  headaches with no reported changes in character, quality, location, severity, or frequency. She denies new associated neurological changes. Considered LP to exclude CNS leukemia, but given this reassuring history and absence of other indications for LP (no hyperleukocytosis, no monocytic phenotype, no FLT3 mutation, etc), this was ultimately deferred.  - APAP PRN  - Continue PTA sumatriptan and cyclobenzaprine PRN  - Could reconsider need for LP if headache worsens/changes in character or quality in the future    MISC  # Maculopapular rash  Noted on 3/25. Pruritic, localized to the neck, right shoulder, and flexural area behind the right knee. Clinically, affected areas consist of coalescing erythematous macules with scattered papules. No obvious vesicles or bullae. DDx includes contact dermatitis (favored, possibly due to CHG wipes) versus atopic dermatitis (flexural areas raise suspicion for this) versus drug eruption versus other. Improved as of 3/26 with triamcinolone ointment.  - Continue triamcinolone ointment 0.1% BID to the affected areas  - Okay to defer CHG wipes given concern for contact dermatitis  - Monitor clinically    # BROOKS  # MDD  # At risk for adjustment disorder  Patient reports good control of anxiety/depressive symptoms prior to admission. Did note feeling overwhelmed by new diagnosis. Offered health psychology or cordelia, which she declined.  - Readdress health psych consult as indicated  - Continue PTA paroxetine   - Atarax as needed     # Tobacco use disorder  Has smoked since age 16, 1.5 ppd (roughly 65 pack years). Attempting to cut back as recently as 01/2025 to 0.5 ppd. We discussed the risks of smoking during induction chemotherapy including increased risk for infection in setting of severe neutropenia that could further complicate course, placing her at risk for severe complications that could be life-threatening or even fatal.  - Encourage smoking cessation; patient continues to smoke  at this time, despite understanding/acknowledgement of the risks.  - Trialed nicotine patch, then self-discontinued after reporting that she felt the nicotine patch precipitated an anxiety attack/episode of chest pain on 3/9. Offered a lower dose versus alternative form of NRT, which patient declined.      Chronic Problems:  # Vitamin D deficiency - Continue PTA vit D supplement  # Seasonal allergies - Claritin 10 mg daily  # Rheumatoid arthritis - Patient reported trying treatment though was unable to tolerate well. Managed with Tylenol PRN. Most recent RF >650 (2/10/25). JI negative.    # Prediabetes - Last A1c 6.0 x12/9/24. Has been managing with lifestyle modifications.   # Degenerative disc disease, L5-S1 - Continue PTA gabapentin PRN  # Mixed hyperlipidemia - Lipid panel has remained at goal, 1/14/25 panel w/ cholesterol 163, , LDL 92, HDL 45.   - Held PTA atorvastatin on admission due to chemotherapy interactions. Consider rotation to rosuvastatin for better drug-drug interaction profile.   # H/o aortic stenosis - Patient noted on admission longstanding history of aortic stenosis. Pre-chemo echo w/ EF 60-65% and mild aortic stenosis and insufficiency. No previous echo to compare.     Resolved Hospital Problems:  # Mild hyponatremia, resolved - New mild hyponatremia noted 3/13 with Na 135 ? 130. Asymptomatic. Now resolved.  # Non-radiating chest pain, resolved - for details see note from 3/21 and prior   # Urinary frequency + Dysuria, resolved - On admission, patient noted longstanding history of urinary frequency though new mild dysuria. UA (2/26) negative, UC with no growth. Symptoms have since resolved.  # R AOM with purulent effusion and Small L TM perforation, resolved - ENT consulted, appreciate recs. Completed 7-day course Levaquin 750mg daily and 5 day course of floxin drops to left ear for perforation     Clinically Significant Risk Factors               # Hypoalbuminemia: Lowest albumin = 3.2  "g/dL at 3/24/2025  4:59 AM, will monitor as appropriate  # Coagulation Defect: INR = 1.19 (Ref range: 0.85 - 1.15) and/or PTT = 31 Seconds (Ref range: 22 - 38 Seconds), will monitor for bleeding    # Hypertension: Noted on problem list            # Overweight: Estimated body mass index is 29.78 kg/m  as calculated from the following:    Height as of this encounter: 1.626 m (5' 4\").    Weight as of this encounter: 78.7 kg (173 lb 8 oz).        # Financial/Environmental Concerns: none  # Asthma: noted on problem list       Fluids/Electrolytes/Nutrition   - IVF bolus PRN   - PRN lyte replacement per standing protocol  - Regular diet as tolerated      Lines: PICC     PPX  VTE: Lovenox held 3/13 for TCP, resumed 3/28   GI: Pepcid, Protonix  Bowels: prn senna and miralax  Activity: as tolerated      Code Status: DNR/DNI    Disposition: Inpatient admission to [facility] service for treatment of AML; discharge to home pending count recovery and restaging BMBx, likely 7-10 more days.   Follow-up: Will follow with Dr. Harris as primary oncologist and go to [facility] for twice weekly labs/transfusions.      Medically Ready for Discharge: Anticipated in 5+ Days      Staffed with Dr. Jaimes.    I spent >60 minutes in the care of this patient today, which included time necessary for review of interval events, obtaining history and physical exam, ordering medication(s)/test(s) as medically indicated, discussion with interdisciplinary/consult team(s), care coordination, and documentation time.     Andrey Crowell PA-C  Hematology/Oncology  Pager [phone]    Interval History   No acute overnight events. Nursing notes reviewed. Afebrile and hemodynamically stable.    Alejandra is feeling well this morning, no acute concerns. Endorses overall improvement of rash and epigastric pain. Appetite is intact, regular bowel movements. Today we discussed the potential of consolidation therapy pending results of the bone " marrow biopsy scheduled for Monday. While she is anxious to get home, she prefers the idea of staying a few extra days and discharging with more time at home versus going home for a few days then returning for consolidation. She believes this is logistically preferable, and understands that discharge timeline will depend on when consolidation chemotherapy is finalized and initiated. She is agreeable to plan. Denies chest pain, abdominal pain, nausea/vomiting/diarrhea, headache, vision changes, peripheral edema, rash. All concerns were addressed and questions were answered.    A comprehensive review of symptoms was performed and was negative except as detailed in the interval history above.    Physical Exam   Vital Signs with Ranges  Temp:  [97.9  F (36.6  C)-99.4  F (37.4  C)] 98.8  F (37.1  C)  Pulse:  [78-91] 84  Resp:  [16-20] 18  BP: (110-140)/(54-93) 139/82  SpO2:  [90 %-99 %] 94 %    I/O last 3 completed shifts:  In: 0   Out: 200 [Urine:200]    Vitals:    03/25/25 1000 03/26/25 0923 03/27/25 0826   Weight: 79.3 kg (174 lb 12.8 oz) 78.6 kg (173 lb 4.8 oz) 78.7 kg (173 lb 8 oz)     Constitutional: Pleasant and cooperative female, in NAD. Alert, interactive, non-toxic. Smiling and conversational.  HEENT: NC/AT, sclera clear, conjunctiva normal, OP with MMM, no discrete intraoral lesions or thrush, poor dentition.  Respiratory: No increased work of breathing, no crackles or wheezing. Breath sounds mildly diminished to the lung bases bilaterally.  Cardiovascular: RRR, systolic murmur. No peripheral edema. No calf tenderness or palpable cords.  GI: Normal bowel sounds. Abdomen with central obesity, is soft, non-distended and non-tender to palpation.  Skin: Warm, dry, well-perfused. Patchy erythematous macules/patches with few scattered papula noted to the base of the neck, right shoulder, and flexural area of the right knee. No vesicles. No warmth, tenderness, or crepitus.   Musculoskeletal: Diminished muscle bulk,  no gross deformities.  Neurologic: A&O. Answers questions appropriately, speech normal. Moves all extremities spontaneously.  Psychiatric: Normal mood and affect.  Vascular access:  PICC on RUE CDI, non-tender, no surrounding erythema.    Medications   Current Facility-Administered Medications   Medication Dose Route Frequency Provider Last Rate Last Admin     Current Facility-Administered Medications   Medication Dose Route Frequency Provider Last Rate Last Admin    acyclovir (ZOVIRAX) tablet 400 mg  400 mg Oral BID Chelsie Murphy PA-C   400 mg at 03/27/25 1941    famotidine (PEPCID) tablet 20 mg  20 mg Oral BID Zully Garcia PA-C   20 mg at 03/27/25 1941    fluticasone-vilanterol (BREO ELLIPTA) 100-25 MCG/ACT inhaler 1 puff  1 puff Inhalation Daily Chelsie Murphy PA-C   1 puff at 03/27/25 0819    heparin lock flush 10 unit/mL injection 5-20 mL  5-20 mL Intracatheter Q24H Chelsie Murphy PA-C   5 mL at 03/28/25 0447    levofloxacin (LEVAQUIN) tablet 500 mg  500 mg Oral Daily Chelsie Davila PA-C   500 mg at 03/27/25 0819    [Held by provider] lisinopril (ZESTRIL) tablet 10 mg  10 mg Oral Daily Jyoti Tenorio PA-C   10 mg at 03/10/25 1039    loratadine (CLARITIN) tablet 10 mg  10 mg Oral Daily Chelsie Murphy PA-C   10 mg at 03/27/25 0824    pantoprazole (PROTONIX) EC tablet 40 mg  40 mg Oral BID AC Nery Crowell PA-C   40 mg at 03/27/25 1727    PARoxetine (PAXIL) tablet 20 mg  20 mg Oral Chelsie Lowery PA-C   20 mg at 03/27/25 0819    posaconazole (NOXAFIL) DR tablet TBEC 300 mg  300 mg Oral Nery Dubose PA-C   300 mg at 03/27/25 0819    sodium chloride (PF) 0.9% PF flush 10-40 mL  10-40 mL Intracatheter Q8H Chelsie Murphy PA-C   10 mL at 03/28/25 0448    sucralfate (CARAFATE) suspension 1 g  1 g Oral 4x Daily AC & HS Aman Cervantes MD   1 g at 03/27/25 7458    triamcinolone (KENALOG)  0.1 % ointment   Topical BID Chelsie Davila PA-C   Given at 03/27/25 1941    Vitamin D3 (CHOLECALCIFEROL) tablet 1,000 Units  1,000 Units Oral Daily Chelsie Murphy PA-C   1,000 Units at 03/27/25 0819     Antiinfectives  Anti-infectives (From now, onward)      Start     Dose/Rate Route Frequency Ordered Stop    03/25/25 2000  levofloxacin (LEVAQUIN) tablet 500 mg         500 mg Oral DAILY 03/24/25 0819      03/18/25 0800  posaconazole (NOXAFIL) DR tablet TBEC 300 mg         300 mg Oral EVERY MORNING 03/17/25 1242      02/26/25 2000  acyclovir (ZOVIRAX) tablet 400 mg         400 mg Oral 2 TIMES DAILY 02/26/25 1307            Data   CBC   Recent Labs   Lab 03/28/25  0449 03/27/25  0500 03/26/25  0541 03/25/25  0407   WBC 2.0* 1.6* 1.0* 0.7*   RBC 2.54* 2.59* 2.59* 2.07*   HGB 7.7* 8.2* 8.1* 6.6*   HCT 23.0* 24.1* 22.8* 18.7*   MCV 91 93 88 90   MCH 30.3 31.7 31.3 31.9   MCHC 33.5 34.0 35.5 35.3   RDW 14.7 14.7 14.8 14.4   * 317 161 65*     CMP   Recent Labs   Lab 03/28/25  0449 03/27/25  1722 03/27/25  0500 03/26/25  0541 03/25/25  1904 03/25/25  0407     --  140 141  --  140   POTASSIUM 3.5 3.8 3.4 3.7   < > 3.3*   CHLORIDE 107  --  104 105  --  105   CO2 25  --  25 26  --  24   ANIONGAP 10  --  11 10  --  11   *  --  114* 113*  --  114*   BUN 7.5  --  10.3 12.1  --  14.2   CR 0.58  --  0.57 0.61  --  0.58   GFRESTIMATED >90  --  >90 >90  --  >90   TOBIN 8.7*  --  8.9 8.9  --  8.5*   MAG 2.2  --  2.2 2.4*  --  2.0   PHOS 3.8  --  3.5 3.0  --  3.6   PROTTOTAL 6.3*  --  6.3* 6.4  --  6.0*   ALBUMIN 3.4*  --  3.4* 3.4*  --  3.3*   BILITOTAL 0.2  --  0.2 0.3  --  0.2   ALKPHOS 116  --  118 117  --  111   AST 14  --  13 12  --  13   ALT 10  --  11 10  --  9    < > = values in this interval not displayed.     LFTs   Recent Labs   Lab 03/28/25  0449   PROTTOTAL 6.3*   ALBUMIN 3.4*   BILITOTAL 0.2   ALKPHOS 116   AST 14   ALT 10     Coagulation Studies   Recent Labs   Lab  03/28/25  0449   INR 1.19*   PTT 31

## 2025-03-28 NOTE — PLAN OF CARE
"BP (!) 140/83 (BP Location: Left arm)   Pulse 88   Temp 97.9  F (36.6  C) (Oral)   Resp 16   Ht 1.626 m (5' 4\")   Wt 78.7 kg (173 lb 8 oz)   LMP 01/01/2007 (Approximate)   SpO2 99%   BMI 29.78 kg/m      [id number]    Patient A&Ox4, on room air, independent, regular diet tolerated well, afebrile, VSS. Pain in hip stated 6/10, tylenol and atarax given x1. Voiding spontaneously, no BM this shift. She went to smoke several times. BMBx scheduled for Monday, she is very hopeful to discharge home next Tuesday. Continue POC.     "

## 2025-03-29 LAB
ALBUMIN SERPL BCG-MCNC: 3.4 G/DL (ref 3.5–5.2)
ALP SERPL-CCNC: 125 U/L (ref 40–150)
ALT SERPL W P-5'-P-CCNC: 9 U/L (ref 0–50)
ANION GAP SERPL CALCULATED.3IONS-SCNC: 10 MMOL/L (ref 7–15)
AST SERPL W P-5'-P-CCNC: 12 U/L (ref 0–45)
BASOPHILS # BLD AUTO: 0 10E3/UL (ref 0–0.2)
BASOPHILS NFR BLD AUTO: 1 %
BILIRUB SERPL-MCNC: 0.2 MG/DL
BUN SERPL-MCNC: 10.4 MG/DL (ref 6–20)
CALCIUM SERPL-MCNC: 9.1 MG/DL (ref 8.8–10.4)
CHLORIDE SERPL-SCNC: 106 MMOL/L (ref 98–107)
CREAT SERPL-MCNC: 0.59 MG/DL (ref 0.51–0.95)
EGFRCR SERPLBLD CKD-EPI 2021: >90 ML/MIN/1.73M2
EOSINOPHIL # BLD AUTO: 0 10E3/UL (ref 0–0.7)
EOSINOPHIL NFR BLD AUTO: 0 %
ERYTHROCYTE [DISTWIDTH] IN BLOOD BY AUTOMATED COUNT: 14.8 % (ref 10–15)
ERYTHROCYTE [DISTWIDTH] IN BLOOD BY AUTOMATED COUNT: 14.9 % (ref 10–15)
GLUCOSE SERPL-MCNC: 123 MG/DL (ref 70–99)
HCO3 SERPL-SCNC: 25 MMOL/L (ref 22–29)
HCT VFR BLD AUTO: 24.2 % (ref 35–47)
HCT VFR BLD AUTO: 24.3 % (ref 35–47)
HGB BLD-MCNC: 8.1 G/DL (ref 11.7–15.7)
HGB BLD-MCNC: 8.2 G/DL (ref 11.7–15.7)
IMM GRANULOCYTES # BLD: 0.1 10E3/UL
IMM GRANULOCYTES NFR BLD: 2 %
LDH SERPL L TO P-CCNC: 262 U/L (ref 0–250)
LYMPHOCYTES # BLD AUTO: 0.9 10E3/UL (ref 0.8–5.3)
LYMPHOCYTES NFR BLD AUTO: 27 %
MAGNESIUM SERPL-MCNC: 2.3 MG/DL (ref 1.7–2.3)
MCH RBC QN AUTO: 31.4 PG (ref 26.5–33)
MCH RBC QN AUTO: 31.9 PG (ref 26.5–33)
MCHC RBC AUTO-ENTMCNC: 33.5 G/DL (ref 31.5–36.5)
MCHC RBC AUTO-ENTMCNC: 33.7 G/DL (ref 31.5–36.5)
MCV RBC AUTO: 94 FL (ref 78–100)
MCV RBC AUTO: 95 FL (ref 78–100)
MONOCYTES # BLD AUTO: 0.8 10E3/UL (ref 0–1.3)
MONOCYTES NFR BLD AUTO: 24 %
NEUTROPHILS # BLD AUTO: 1.6 10E3/UL (ref 1.6–8.3)
NEUTROPHILS NFR BLD AUTO: 47 %
NRBC # BLD AUTO: 0 10E3/UL
NRBC BLD AUTO-RTO: 1 /100
PHOSPHATE SERPL-MCNC: 3.9 MG/DL (ref 2.5–4.5)
PLAT MORPH BLD: ABNORMAL
PLAT MORPH BLD: ABNORMAL
PLATELET # BLD AUTO: ABNORMAL 10*3/UL
PLATELET # BLD AUTO: ABNORMAL 10*3/UL
POLYCHROMASIA BLD QL SMEAR: SLIGHT
POTASSIUM SERPL-SCNC: 3.7 MMOL/L (ref 3.4–5.3)
PROT SERPL-MCNC: 6.6 G/DL (ref 6.4–8.3)
RBC # BLD AUTO: 2.57 10E6/UL (ref 3.8–5.2)
RBC # BLD AUTO: 2.58 10E6/UL (ref 3.8–5.2)
RBC MORPH BLD: ABNORMAL
RBC MORPH BLD: ABNORMAL
SODIUM SERPL-SCNC: 141 MMOL/L (ref 135–145)
URATE SERPL-MCNC: 2.9 MG/DL (ref 2.4–5.7)
WBC # BLD AUTO: 3 10E3/UL (ref 4–11)
WBC # BLD AUTO: 3.3 10E3/UL (ref 4–11)

## 2025-03-29 PROCEDURE — 250N000013 HC RX MED GY IP 250 OP 250 PS 637: Performed by: HOSPITALIST

## 2025-03-29 PROCEDURE — 82435 ASSAY OF BLOOD CHLORIDE: CPT

## 2025-03-29 PROCEDURE — 120N000002 HC R&B MED SURG/OB UMMC

## 2025-03-29 PROCEDURE — 250N000013 HC RX MED GY IP 250 OP 250 PS 637

## 2025-03-29 PROCEDURE — 85004 AUTOMATED DIFF WBC COUNT: CPT

## 2025-03-29 PROCEDURE — 250N000011 HC RX IP 250 OP 636

## 2025-03-29 PROCEDURE — 83615 LACTATE (LD) (LDH) ENZYME: CPT

## 2025-03-29 PROCEDURE — 250N000013 HC RX MED GY IP 250 OP 250 PS 637: Performed by: PHYSICIAN ASSISTANT

## 2025-03-29 PROCEDURE — 85041 AUTOMATED RBC COUNT: CPT

## 2025-03-29 PROCEDURE — 85007 BL SMEAR W/DIFF WBC COUNT: CPT

## 2025-03-29 PROCEDURE — 84550 ASSAY OF BLOOD/URIC ACID: CPT | Performed by: PHYSICIAN ASSISTANT

## 2025-03-29 PROCEDURE — 83735 ASSAY OF MAGNESIUM: CPT

## 2025-03-29 PROCEDURE — 82947 ASSAY GLUCOSE BLOOD QUANT: CPT

## 2025-03-29 PROCEDURE — 84100 ASSAY OF PHOSPHORUS: CPT | Performed by: PHYSICIAN ASSISTANT

## 2025-03-29 PROCEDURE — 99233 SBSQ HOSP IP/OBS HIGH 50: CPT | Mod: FS

## 2025-03-29 PROCEDURE — 82565 ASSAY OF CREATININE: CPT

## 2025-03-29 RX ORDER — GABAPENTIN 300 MG/1
300 CAPSULE ORAL AT BEDTIME
Status: DISCONTINUED | OUTPATIENT
Start: 2025-03-29 | End: 2025-03-30

## 2025-03-29 RX ORDER — CYCLOBENZAPRINE HCL 5 MG
10 TABLET ORAL ONCE
Status: COMPLETED | OUTPATIENT
Start: 2025-03-29 | End: 2025-03-29

## 2025-03-29 RX ADMIN — ACETAMINOPHEN 650 MG: 325 TABLET, FILM COATED ORAL at 21:02

## 2025-03-29 RX ADMIN — CYCLOBENZAPRINE HYDROCHLORIDE 10 MG: 5 TABLET, FILM COATED ORAL at 23:55

## 2025-03-29 RX ADMIN — SUCRALFATE ORAL 1 G: 1 SUSPENSION ORAL at 09:19

## 2025-03-29 RX ADMIN — HYDROXYZINE HYDROCHLORIDE 25 MG: 25 TABLET, FILM COATED ORAL at 01:42

## 2025-03-29 RX ADMIN — ACYCLOVIR 400 MG: 400 TABLET ORAL at 09:14

## 2025-03-29 RX ADMIN — FAMOTIDINE 20 MG: 20 TABLET, FILM COATED ORAL at 09:14

## 2025-03-29 RX ADMIN — SUCRALFATE ORAL 1 G: 1 SUSPENSION ORAL at 21:03

## 2025-03-29 RX ADMIN — GABAPENTIN 400 MG: 300 CAPSULE ORAL at 16:28

## 2025-03-29 RX ADMIN — PANTOPRAZOLE SODIUM 40 MG: 40 TABLET, DELAYED RELEASE ORAL at 16:28

## 2025-03-29 RX ADMIN — CYCLOBENZAPRINE HYDROCHLORIDE 10 MG: 5 TABLET, FILM COATED ORAL at 16:28

## 2025-03-29 RX ADMIN — HYDROXYZINE HYDROCHLORIDE 25 MG: 25 TABLET, FILM COATED ORAL at 21:02

## 2025-03-29 RX ADMIN — SUCRALFATE ORAL 1 G: 1 SUSPENSION ORAL at 16:28

## 2025-03-29 RX ADMIN — FLUTICASONE FUROATE AND VILANTEROL TRIFENATATE 1 PUFF: 100; 25 POWDER RESPIRATORY (INHALATION) at 09:21

## 2025-03-29 RX ADMIN — ACETAMINOPHEN 650 MG: 325 TABLET, FILM COATED ORAL at 09:19

## 2025-03-29 RX ADMIN — CYCLOBENZAPRINE HYDROCHLORIDE 10 MG: 5 TABLET, FILM COATED ORAL at 02:22

## 2025-03-29 RX ADMIN — ACETAMINOPHEN 650 MG: 325 TABLET, FILM COATED ORAL at 01:36

## 2025-03-29 RX ADMIN — HYDROXYZINE HYDROCHLORIDE 25 MG: 25 TABLET, FILM COATED ORAL at 13:11

## 2025-03-29 RX ADMIN — LEVOFLOXACIN 500 MG: 500 TABLET, FILM COATED ORAL at 09:14

## 2025-03-29 RX ADMIN — PAROXETINE HYDROCHLORIDE 20 MG: 20 TABLET, FILM COATED ORAL at 09:13

## 2025-03-29 RX ADMIN — PANTOPRAZOLE SODIUM 40 MG: 40 TABLET, DELAYED RELEASE ORAL at 09:14

## 2025-03-29 RX ADMIN — GABAPENTIN 400 MG: 300 CAPSULE ORAL at 02:22

## 2025-03-29 RX ADMIN — ACYCLOVIR 400 MG: 400 TABLET ORAL at 21:02

## 2025-03-29 RX ADMIN — Medication 5 ML: at 04:44

## 2025-03-29 RX ADMIN — FAMOTIDINE 20 MG: 20 TABLET, FILM COATED ORAL at 21:03

## 2025-03-29 RX ADMIN — ENOXAPARIN SODIUM 40 MG: 40 INJECTION SUBCUTANEOUS at 21:03

## 2025-03-29 RX ADMIN — GABAPENTIN 400 MG: 300 CAPSULE ORAL at 13:11

## 2025-03-29 RX ADMIN — GABAPENTIN 300 MG: 300 CAPSULE ORAL at 21:02

## 2025-03-29 RX ADMIN — LORATADINE 10 MG: 10 TABLET ORAL at 09:14

## 2025-03-29 RX ADMIN — Medication 1000 UNITS: at 09:13

## 2025-03-29 RX ADMIN — POSACONAZOLE 300 MG: 100 TABLET, DELAYED RELEASE ORAL at 09:14

## 2025-03-29 RX ADMIN — SUCRALFATE ORAL 1 G: 1 SUSPENSION ORAL at 11:13

## 2025-03-29 ASSESSMENT — ACTIVITIES OF DAILY LIVING (ADL)
ADLS_ACUITY_SCORE: 35
ADLS_ACUITY_SCORE: 36
ADLS_ACUITY_SCORE: 35
ADLS_ACUITY_SCORE: 35
ADLS_ACUITY_SCORE: 36
ADLS_ACUITY_SCORE: 35
ADLS_ACUITY_SCORE: 35
ADLS_ACUITY_SCORE: 36
ADLS_ACUITY_SCORE: 35
ADLS_ACUITY_SCORE: 36
ADLS_ACUITY_SCORE: 35
ADLS_ACUITY_SCORE: 35
ADLS_ACUITY_SCORE: 36
ADLS_ACUITY_SCORE: 35
ADLS_ACUITY_SCORE: 36
ADLS_ACUITY_SCORE: 35
ADLS_ACUITY_SCORE: 35

## 2025-03-29 NOTE — PLAN OF CARE
"Goal Outcome Evaluation:      Plan of Care Reviewed With: patient      Patient continues to frequent outside for smoking breaks. Patient is independent. Denies pain, eating well, and VSS. Patients platelets continue to \"clump\"- provider is aware. Double lumen PICC flushes great, only purple lumen has good blood return. Will continue to monitor           "

## 2025-03-29 NOTE — PLAN OF CARE
Goal Outcome Evaluation:      Plan of Care Reviewed With: patient    Overall Patient Progress: no change    Significant 24 hour events: None    Level of Care: Acute    Summary Type: 5A Report   Pain: Controlled with PRN tylenol x2, gabapentin x1, hot packs x2.   Neuro:WDL  CV:WDL  Resp:.WDL except, cough - infrequent, non-productive cough   GI: WDL except, loose stools x2.   : WDL  Skin: .WDL except, integrity- PICC x1  Activity Assistance: independent  Safety/Falls Risk: Level of Risk: Moderate Risk  Consults: None  Procedures/Imaging: BM Bx- 3/31 at 11AM   Precautions: Neutropenia   Diet: Regular

## 2025-03-29 NOTE — PROGRESS NOTES
[facility]    Progress Note  Hematology / Oncology     Date of Admission:  2/26/2025  Hospital Day #: 31   Date of Service (when I saw the patient): 03/29/2025    Assessment & Plan   Alejandra Villegas is a 59 year old female with a past medical history significant for COPD, migraines, rheumatoid arthritis, aortic stenosis, Afib, and anxiety who presented to an outside hospital with cytopenias and was found on BMBx to have hypercellular marrow with 4% blasts, 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 She was subsequently admitted to [facility] on 2/26/25 for further work-up and management and pathology re-review was most consistent with AML with NPM1 mutation by WHO 2022 (which does not require a specific blast threshold). Following further multidisciplinary discussion, she started intensive induction with 7+3 (D1=3/5/25). Her course has been complicated by neutropenic fevers, epigastric pain, influenza A, AOM, and L TM perforation. Midpoint BMBx morphology showing no morphologic or immunophenotypic evidence of acute myeloid leukemia.     TODAY:  - D25 7+3 induction.   - Scheduled for a restaging BMBx on 3/31 at 11 AM. Pending BMBx results, plan to proceed with consolidation prior to discharge.  - Consider discontinuing levaquin in next 1-2 days with recovery of ANC. Likely continue posaconazole in setting of active smoking.  - Start scheduled gabapentin 300 mg at bedtime for L sided sciatic pain.  - Continue best supportive cares.    HEME  # AML with NPM1 mutation  Had been seen by PCP Dec 2024 w/ progressive fatigue and nausea where she was found leukopenic WBC  2.4 and neutropenic w/ ANC 0.3. Hgb 12. Was referred to local hematology/oncology clinic for further evaluation and seen by Dr. Samina Harris. BMBx 2/10/25 done at OSH showed hypercellular marrow w/ trilineage hematopoiesis w/ dyspoiesis with mildly elevated blasts of 4% on morphology. NGS w/  NPM1, IDH2, and NRAS mutations. She was admitted to [facility] 2/26/25 for further workup and management. Slides were re-reviewed by [facility] Hematopathology, who noted hypercellular marrow with 8% blasts by morphology. Despite relatively low blast percentage, findings were felt most consistent with a diagnosis AML with NPM1 mutation by WHO 2022 (which does not require a specific blast threshold). Following interdepartmental discussions and review of the available literature, patient was started on intensive induction with 7+3 (Day 1=3/5/25).   - PICC placed 3/5/2025, plan to discontinue on discharge.   - Baseline cardiopulmonary studies:  - Echo w/ EF 60-65%, mild known aortic stenosis.   - EKG (2/27) with NSR, QTc 412  - CXR (2/26) with mildly increased streaky bibasilar opacities, atelectasis or edema. No consolidation.   - Baseline viral serologies: CMV IgG+, EBV IgG+, HepB sAg-, HepB cAb-, HepB sAb-, HSV1+/2+, HIV-  - HLA Typing sent on admission. Message sent to notify BMT team x2/27 for IP consult.   - Midcycle BMBx 3/19/25: Flow with no increase in myeloid blasts and no abnormal myeloid blast population. Morphology with no morphologic or immunophenotypic evidence of acute myeloid leukemia.   - D28 BMBx scheduled 3/31/25 at 11 AM. Pending BMBx results, plan to proceed with consolidation prior to discharge.               Treatment plan: 7+3 (C1D1=3/5/2025)  - Daunorubicin 60 mg/m  IV D1-3  - Cytarabine 100 mg/m  IV D1-7    Supportive medications: Zofran, dexamethasone 12 mg D1-3     # Pancytopenia  Secondary to underlying hematologic malignancy and exacerbated by recent chemotherapy.  - Follow daily CBC with differential  - Transfuse to keep Hgb >7, plt >10K  - Blood consent obtained 2/26/25 on admission and placed in patient's paper chart.      ID  # Neutropenic fever, resolved  # Influenza A, resolved  Episode #1:  2/25: On admission, patient noted subjective fever with chills and rhinitis beginning 2/25 PM prior to  admission. No other localizing s/sx of infection. She was afebrile on admission but spiked a low-grade fever to 100.7 on 2/26 PM. Blood and urine cultures negative, CXR with streaky opacities but no burt consolidation. Work-up positive for influenza A, and she completed a course of Tamiflu 75 mg BID x5 days (2/26-3/3). She also received a short empiric course of IV cefepime (2/26-3/1) given concurrent neutropenia.  Episode #2:  3/13: Febrile to 100.4 overnight x3/13. No localizing/new symptoms. OVSS. Patient was wearing heated jacket that she attributed to temperature, no documentation noted of notifying cross cover MD. No further ID workup (BCx, UA, UC, or CXR) completed and no antibiotic changes made. Given borderline temperature, decision made to continue monitoring and no further work-up was undertaken.  Episode #3:  3/19: Tmax 100.6 overnight. Asymptomatic, although notes sweating upon waking the following morning. Started IV Cefepime 2 g q8h (3/18-X). CXR with scattered patchy densities, especially in the lung bases. Respiratory panel negative. UA with protein and blood but otherwise bland, UCx negative. BCx NGTD. A CT C/A/P was done (for other reasons, as below) and notable for findings suggestive of esophagitis as well as possible bronchitis/small airways disease. She was treated with a 7-day course of IV cefepime (3/18-3/25) and fever did not recur.    # ID prophylaxis  - Acyclovir 400 mg BID  - Levaquin 500 mg daily  - Posaconazole 300 mg daily   - Vori w/ $1.60 copay, posa requiring PA also w/ $1.60 copay. Rotated from deny to vori (3/13-3/17) w/ completion of chemotherapy, then transitioned to posa x3/18 given visual hallucinations x3/17     GI  # GERD, improved  # Epigastric pain, resolved  Reported recent increase in symptoms in the days leading up to admission and initiated famotidine. Persistent/progressive symptoms throughout admission. Cardiac work-up reassuring, lipase negative. Increased H2B to  BID (3/9); given persistent symptoms, PPI started (3/14). Increased to BID (3/19). CT C/A/P with evidence of esophagitis (thought due to reflux/recent chemotherapy) and small hiatal hernia. Symptoms have improved on maximal medical therapy as below.  - Continue famotidine 20 mg BID  - Continue Protonix 40 mg BID  - GI cocktail PRN  - TUMS PRN  - GI consulted, EGD deferred for now as risks thought to outweigh the benefits in the setting of neutropenia; can reassess upon count recovery  - Continue to encourage lifestyle modifications: avoid straws, carbonated beverages, GERD precautions    # Risk of malnutrition  Secondary to esophagitis/GERD/mucositis picture as above, patient intake has decreased to small servings, mostly eating soup. On regular adult diet with thin liquids per SLP. Recommended trial of high caloric liquids, patient tried Strawberry Ensure and plans to continue to order with meals.  - RD following; appreciate recs     # Nausea/vomiting, improving  Patient noted ongoing nausea w/ occasional vomiting starting Dec 2024. Has been managed with PRN Zofran.  Endorsed nausea on admission, now resolved.   - PRN Zofran and compazine  - Could consider trial of PRN Zyprexa if 3rd agent is desired/required     PULM  # COPD  Longstanding history of COPD. On admission patient reports symptoms are manageable with no recent exacerbations. Most recent PFTs (2018) were normal.  - Continue PTA Breo Ellipta inhaler and PRN DuoNebs      CV  # Essential hypertension  - Continue PTA lisinopril - HELD 3/11 w/ soft BPs (90s-100s/50s-60s), resume pending daily trends    # Infrarenal abdominal aortic aneurysm  Noted incidentally on CT C/A/P (3/19/25), measuring 3.4 cm in diameter.  - Attention on follow-up imaging    RENAL/FEN  # Stage 2 CKD  Baseline Cr ~ 0.7. On admission Cr 0.77.  - Continue to trend on daily labs and encourage PO hydration     NEURO  # Chronic migraine w/o aura  # Chronic headaches  Present since  childhood. Reportedly triggered by neck manipulation/movement. Reportedly well-managed with regimen below. Headaches occurring throughout admission with daily APAP use, also chronic and managed with APAP PRN at home. Patient notes that her headaches throughout admission have been consistent with her typical daily headaches with no reported changes in character, quality, location, severity, or frequency. She denies new associated neurological changes. Considered LP to exclude CNS leukemia, but given this reassuring history and absence of other indications for LP (no hyperleukocytosis, no monocytic phenotype, no FLT3 mutation, etc), this was ultimately deferred.  - APAP PRN  - Continue PTA sumatriptan and cyclobenzaprine PRN  - Could reconsider need for LP if headache worsens/changes in character or quality in the future    MISC  # Maculopapular rash  Noted on 3/25. Pruritic, localized to the neck, right shoulder, and flexural area behind the right knee. Clinically, affected areas consist of coalescing erythematous macules with scattered papules. No obvious vesicles or bullae. DDx includes contact dermatitis (favored, possibly due to CHG wipes) versus atopic dermatitis (flexural areas raise suspicion for this) versus drug eruption versus other. Improved as of 3/26 with triamcinolone ointment.  - Continue triamcinolone ointment 0.1% BID to the affected areas  - Okay to defer CHG wipes given concern for contact dermatitis  - Monitor clinically    # BROOKS  # MDD  # At risk for adjustment disorder  Patient reports good control of anxiety/depressive symptoms prior to admission. Did note feeling overwhelmed by new diagnosis. Offered health psychology or cordelia, which she declined.  - Readdress health psych consult as indicated  - Continue PTA paroxetine   - Atarax as needed     # Tobacco use disorder  Has smoked since age 16, 1.5 ppd (roughly 65 pack years). Attempting to cut back as recently as 01/2025 to 0.5 ppd. We  discussed the risks of smoking during induction chemotherapy including increased risk for infection in setting of severe neutropenia that could further complicate course, placing her at risk for severe complications that could be life-threatening or even fatal.  - Encourage smoking cessation; patient continues to smoke at this time, despite understanding/acknowledgement of the risks.  - Trialed nicotine patch, then self-discontinued after reporting that she felt the nicotine patch precipitated an anxiety attack/episode of chest pain on 3/9. Offered a lower dose versus alternative form of NRT, which patient declined.      Chronic Problems:  # Vitamin D deficiency - Continue PTA vit D supplement  # Seasonal allergies - Claritin 10 mg daily  # Rheumatoid arthritis - Patient reported trying treatment though was unable to tolerate well. Managed with Tylenol PRN. Most recent RF >650 (2/10/25). JI negative.    # Prediabetes - Last A1c 6.0 x12/9/24. Has been managing with lifestyle modifications.   # Degenerative disc disease, L5-S1 - Continue PTA gabapentin PRN  # Mixed hyperlipidemia - Lipid panel has remained at goal, 1/14/25 panel w/ cholesterol 163, , LDL 92, HDL 45.   - Held PTA atorvastatin on admission due to chemotherapy interactions. Consider rotation to rosuvastatin for better drug-drug interaction profile.   # H/o aortic stenosis - Patient noted on admission longstanding history of aortic stenosis. Pre-chemo echo w/ EF 60-65% and mild aortic stenosis and insufficiency. No previous echo to compare.     Resolved Hospital Problems:  # Mild hyponatremia, resolved - New mild hyponatremia noted 3/13 with Na 135 ? 130. Asymptomatic. Now resolved.  # Non-radiating chest pain, resolved - for details see note from 3/21 and prior   # Urinary frequency + Dysuria, resolved - On admission, patient noted longstanding history of urinary frequency though new mild dysuria. UA (2/26) negative, UC with no growth. Symptoms  have since resolved.  # R AOM with purulent effusion and Small L TM perforation, resolved - ENT consulted, appreciate recs. Completed 7-day course Levaquin 750mg daily and 5 day course of floxin drops to left ear for perforation     Clinically Significant Risk Factors               # Hypoalbuminemia: Lowest albumin = 3.2 g/dL at 3/24/2025  4:59 AM, will monitor as appropriate  # Coagulation Defect: INR = 1.19 (Ref range: 0.85 - 1.15) and/or PTT = 31 Seconds (Ref range: 22 - 38 Seconds), will monitor for bleeding    # Hypertension: Noted on problem list                  # Financial/Environmental Concerns: none  # Asthma: noted on problem list       Fluids/Electrolytes/Nutrition   - IVF bolus PRN   - PRN lyte replacement per standing protocol  - Regular diet as tolerated      Lines: PICC     PPX  VTE: Lovenox held 3/13 for TCP, resumed 3/28   GI: Pepcid, Protonix  Bowels: prn senna and miralax  Activity: as tolerated      Code Status: DNR/DNI    Disposition: Inpatient admission to [facility] service for treatment of AML; discharge to home pending count recovery and restaging BMBx, likely 7-10 more days.   Follow-up: Will follow with Dr. Harris as primary oncologist and go to [facility] for twice weekly labs/transfusions.      Medically Ready for Discharge: Anticipated in 5+ Days      Staffed with Dr. Jaimes.    I spent 45 minutes in the care of this patient today, which included time necessary for review of interval events, obtaining history and physical exam, ordering medication(s)/test(s) as medically indicated, discussion with interdisciplinary/consult team(s), care coordination, and documentation time.     Zully Garcia PA-C  Hematology/Oncology  Pager: #[phone]     Interval History   No acute overnight events. Nursing notes reviewed. Afebrile and hemodynamically stable.  Continues to feel well this morning, face timing with family.  Continues to entertain herself with coloring and  puzzle books.  Overall in good spirits.  She does admit being a little bummed about not leaving after biopsy Monday, but has concluded this is for the best as then when she goes home she can spend the most time with her animals rather than having to leave again.  She is excited to go home and be out of the hospital. Denies headache, vision changes, N/V, chest pain, palpitations, shortness of breath, abdominal pain or cramping, dysuria. Continues to move bowels.  She does endorse left-sided sciatic pain, states this is chronic but has been more bothersome in past few days, improves with heat pack and ambulation.  Otherwise feeling well.  Reviewed plan of care for today.  All questions were addressed.    A comprehensive review of symptoms was performed and was negative except as detailed in the interval history above.    Physical Exam   Vital Signs with Ranges  Temp:  [98.3  F (36.8  C)-99.5  F (37.5  C)] 99.4  F (37.4  C)  Pulse:  [73-79] 76  Resp:  [16-18] 18  BP: (116-133)/(66-87) 119/66  SpO2:  [88 %-98 %] 95 %    I/O last 3 completed shifts:  In: 1400 [P.O.:1400]  Out: -     Vitals:    03/27/25 0826 03/28/25 1054 03/29/25 0900   Weight: 78.7 kg (173 lb 8 oz) 78.4 kg (172 lb 12.8 oz) 78.2 kg (172 lb 6.4 oz)     Constitutional: Pleasant and cooperative female, in NAD. Alert, interactive, non-toxic. Smiling and conversational.  HEENT: NC/AT, sclera clear, conjunctiva normal, MMM, poor dentition.  Respiratory: No increased work of breathing. Breath sounds mildly diminished to the lung bases bilaterally. Slight inspiratory wheeze in b/l lung fields.   Cardiovascular: RRR, systolic murmur. No peripheral edema.   GI: Normal bowel sounds. Abdomen with central obesity, is soft, non-distended and non-tender to palpation.  Skin: Warm, dry, well-perfused. Did not visualize rash today.   Musculoskeletal: Diminished muscle bulk, no gross deformities.  Neurologic: A&O. Answers questions appropriately, speech normal. Normal  gait. Moves all extremities spontaneously.  Psychiatric: Normal mood and affect.  Vascular access:  PICC on RUE CDI, non-tender, no surrounding erythema.    Medications   Current Facility-Administered Medications   Medication Dose Route Frequency Provider Last Rate Last Admin     Current Facility-Administered Medications   Medication Dose Route Frequency Provider Last Rate Last Admin    acyclovir (ZOVIRAX) tablet 400 mg  400 mg Oral BID Chelsie Murphy PA-C   400 mg at 03/29/25 0914    enoxaparin ANTICOAGULANT (LOVENOX) injection 40 mg  40 mg Subcutaneous Q24H Nery Crowell PA-C   40 mg at 03/28/25 2109    famotidine (PEPCID) tablet 20 mg  20 mg Oral BID Zully Garcia PA-C   20 mg at 03/29/25 0914    fluticasone-vilanterol (BREO ELLIPTA) 100-25 MCG/ACT inhaler 1 puff  1 puff Inhalation Daily Chelsie Murphy PA-C   1 puff at 03/29/25 0921    heparin lock flush 10 unit/mL injection 5-20 mL  5-20 mL Intracatheter Q24H Chelsie Murphy PA-C   5 mL at 03/29/25 0444    levofloxacin (LEVAQUIN) tablet 500 mg  500 mg Oral Daily Chelsie Davila PA-C   500 mg at 03/29/25 0914    [Held by provider] lisinopril (ZESTRIL) tablet 10 mg  10 mg Oral Daily Jyoti Tenorio PA-C   10 mg at 03/10/25 1039    loratadine (CLARITIN) tablet 10 mg  10 mg Oral Daily Chelsie Murphy PA-C   10 mg at 03/29/25 0914    pantoprazole (PROTONIX) EC tablet 40 mg  40 mg Oral BID AC Nery Crowell PA-C   40 mg at 03/29/25 0914    PARoxetine (PAXIL) tablet 20 mg  20 mg Oral Chelsie Lowery PA-C   20 mg at 03/29/25 0913    posaconazole (NOXAFIL) DR tablet TBEC 300 mg  300 mg Oral Nery Dubose PA-C   300 mg at 03/29/25 0914    sodium chloride (PF) 0.9% PF flush 10-40 mL  10-40 mL Intracatheter Q8H Chelsie Murphy PA-C   10 mL at 03/29/25 0443    sucralfate (CARAFATE) suspension 1 g  1 g Oral 4x Daily AC & HS Aman Cervantes MD    1 g at 03/29/25 1113    triamcinolone (KENALOG) 0.1 % ointment   Topical BID Chelsie Davila PA-C   Given at 03/28/25 1035    Vitamin D3 (CHOLECALCIFEROL) tablet 1,000 Units  1,000 Units Oral Daily Chelsie Murphy PA-C   1,000 Units at 03/29/25 0913     Antiinfectives  Anti-infectives (From now, onward)      Start     Dose/Rate Route Frequency Ordered Stop    03/25/25 2000  levofloxacin (LEVAQUIN) tablet 500 mg         500 mg Oral DAILY 03/24/25 0819      03/18/25 0800  posaconazole (NOXAFIL) DR tablet TBEC 300 mg         300 mg Oral EVERY MORNING 03/17/25 1242      02/26/25 2000  acyclovir (ZOVIRAX) tablet 400 mg         400 mg Oral 2 TIMES DAILY 02/26/25 1307            Data   CBC   Recent Labs   Lab 03/29/25  0747 03/29/25  0431 03/28/25  0449 03/27/25  0500 03/26/25  0541 03/25/25  0407   WBC 3.0* 3.3* 2.0* 1.6* 1.0* 0.7*   RBC 2.58* 2.57* 2.54* 2.59* 2.59* 2.07*   HGB 8.1* 8.2* 7.7* 8.2* 8.1* 6.6*   HCT 24.2* 24.3* 23.0* 24.1* 22.8* 18.7*   MCV 94 95 91 93 88 90   MCH 31.4 31.9 30.3 31.7 31.3 31.9   MCHC 33.5 33.7 33.5 34.0 35.5 35.3   RDW 14.8 14.9 14.7 14.7 14.8 14.4   PLT  --   --  507* 317 161 65*     CMP   Recent Labs   Lab 03/29/25  0431 03/28/25  0449 03/27/25  1722 03/27/25  0500 03/26/25  0541    142  --  140 141   POTASSIUM 3.7 3.5 3.8 3.4 3.7   CHLORIDE 106 107  --  104 105   CO2 25 25  --  25 26   ANIONGAP 10 10  --  11 10   * 114*  --  114* 113*   BUN 10.4 7.5  --  10.3 12.1   CR 0.59 0.58  --  0.57 0.61   GFRESTIMATED >90 >90  --  >90 >90   TOBIN 9.1 8.7*  --  8.9 8.9   MAG 2.3 2.2  --  2.2 2.4*   PHOS 3.9 3.8  --  3.5 3.0   PROTTOTAL 6.6 6.3*  --  6.3* 6.4   ALBUMIN 3.4* 3.4*  --  3.4* 3.4*   BILITOTAL 0.2 0.2  --  0.2 0.3   ALKPHOS 125 116  --  118 117   AST 12 14  --  13 12   ALT 9 10  --  11 10     LFTs   Recent Labs   Lab 03/29/25  0431   PROTTOTAL 6.6   ALBUMIN 3.4*   BILITOTAL 0.2   ALKPHOS 125   AST 12   ALT 9     Coagulation Studies   Recent Labs   Lab  03/28/25  0449   INR 1.19*   PTT 31

## 2025-03-29 NOTE — PLAN OF CARE
Tmax 99.5, ovss. Atarax for anxiety x1. C/o left sciatic pain. Up walking the halls and outside multiple times. Good appetite. BMBX Monday. Continue with POC.

## 2025-03-29 NOTE — PROGRESS NOTES
"Patient has had two lab draws where her platelets come back as, \"clumped\". DESEAN notified. Will continue to monitor  "

## 2025-03-30 LAB
ALBUMIN SERPL BCG-MCNC: 3.3 G/DL (ref 3.5–5.2)
ALP SERPL-CCNC: 125 U/L (ref 40–150)
ALT SERPL W P-5'-P-CCNC: 11 U/L (ref 0–50)
ANION GAP SERPL CALCULATED.3IONS-SCNC: 10 MMOL/L (ref 7–15)
AST SERPL W P-5'-P-CCNC: 15 U/L (ref 0–45)
BILIRUB SERPL-MCNC: 0.2 MG/DL
BUN SERPL-MCNC: 8.8 MG/DL (ref 6–20)
CALCIUM SERPL-MCNC: 8.7 MG/DL (ref 8.8–10.4)
CHLORIDE SERPL-SCNC: 106 MMOL/L (ref 98–107)
CREAT SERPL-MCNC: 0.61 MG/DL (ref 0.51–0.95)
EGFRCR SERPLBLD CKD-EPI 2021: >90 ML/MIN/1.73M2
GLUCOSE SERPL-MCNC: 110 MG/DL (ref 70–99)
HCO3 SERPL-SCNC: 27 MMOL/L (ref 22–29)
LDH SERPL L TO P-CCNC: 298 U/L (ref 0–250)
MAGNESIUM SERPL-MCNC: 2.2 MG/DL (ref 1.7–2.3)
PHOSPHATE SERPL-MCNC: 4.2 MG/DL (ref 2.5–4.5)
POTASSIUM SERPL-SCNC: 3.7 MMOL/L (ref 3.4–5.3)
PROT SERPL-MCNC: 6.5 G/DL (ref 6.4–8.3)
SODIUM SERPL-SCNC: 143 MMOL/L (ref 135–145)
URATE SERPL-MCNC: 2.9 MG/DL (ref 2.4–5.7)

## 2025-03-30 PROCEDURE — 250N000013 HC RX MED GY IP 250 OP 250 PS 637: Performed by: PHYSICIAN ASSISTANT

## 2025-03-30 PROCEDURE — 82947 ASSAY GLUCOSE BLOOD QUANT: CPT

## 2025-03-30 PROCEDURE — 99233 SBSQ HOSP IP/OBS HIGH 50: CPT | Mod: FS

## 2025-03-30 PROCEDURE — 250N000011 HC RX IP 250 OP 636

## 2025-03-30 PROCEDURE — 250N000013 HC RX MED GY IP 250 OP 250 PS 637

## 2025-03-30 PROCEDURE — 85041 AUTOMATED RBC COUNT: CPT

## 2025-03-30 PROCEDURE — 250N000013 HC RX MED GY IP 250 OP 250 PS 637: Performed by: HOSPITALIST

## 2025-03-30 PROCEDURE — 83615 LACTATE (LD) (LDH) ENZYME: CPT

## 2025-03-30 PROCEDURE — 84550 ASSAY OF BLOOD/URIC ACID: CPT | Performed by: PHYSICIAN ASSISTANT

## 2025-03-30 PROCEDURE — 85004 AUTOMATED DIFF WBC COUNT: CPT

## 2025-03-30 PROCEDURE — 82247 BILIRUBIN TOTAL: CPT

## 2025-03-30 PROCEDURE — 120N000002 HC R&B MED SURG/OB UMMC

## 2025-03-30 PROCEDURE — 83735 ASSAY OF MAGNESIUM: CPT

## 2025-03-30 PROCEDURE — 82435 ASSAY OF BLOOD CHLORIDE: CPT

## 2025-03-30 PROCEDURE — 84100 ASSAY OF PHOSPHORUS: CPT | Performed by: PHYSICIAN ASSISTANT

## 2025-03-30 RX ADMIN — HYDROXYZINE HYDROCHLORIDE 25 MG: 25 TABLET, FILM COATED ORAL at 04:33

## 2025-03-30 RX ADMIN — Medication 5 ML: at 05:19

## 2025-03-30 RX ADMIN — ACYCLOVIR 400 MG: 400 TABLET ORAL at 09:36

## 2025-03-30 RX ADMIN — ACYCLOVIR 400 MG: 400 TABLET ORAL at 19:58

## 2025-03-30 RX ADMIN — SUCRALFATE ORAL 1 G: 1 SUSPENSION ORAL at 21:54

## 2025-03-30 RX ADMIN — POSACONAZOLE 300 MG: 100 TABLET, DELAYED RELEASE ORAL at 09:36

## 2025-03-30 RX ADMIN — PANTOPRAZOLE SODIUM 40 MG: 40 TABLET, DELAYED RELEASE ORAL at 16:45

## 2025-03-30 RX ADMIN — HEPARIN, PORCINE (PF) 10 UNIT/ML INTRAVENOUS SYRINGE 5 ML: at 09:36

## 2025-03-30 RX ADMIN — SUCRALFATE ORAL 1 G: 1 SUSPENSION ORAL at 13:05

## 2025-03-30 RX ADMIN — SUCRALFATE ORAL 1 G: 1 SUSPENSION ORAL at 16:45

## 2025-03-30 RX ADMIN — FAMOTIDINE 20 MG: 20 TABLET, FILM COATED ORAL at 19:58

## 2025-03-30 RX ADMIN — FLUTICASONE FUROATE AND VILANTEROL TRIFENATATE 1 PUFF: 100; 25 POWDER RESPIRATORY (INHALATION) at 09:41

## 2025-03-30 RX ADMIN — CYCLOBENZAPRINE HYDROCHLORIDE 10 MG: 5 TABLET, FILM COATED ORAL at 19:58

## 2025-03-30 RX ADMIN — LEVOFLOXACIN 500 MG: 500 TABLET, FILM COATED ORAL at 09:36

## 2025-03-30 RX ADMIN — GABAPENTIN 400 MG: 300 CAPSULE ORAL at 19:59

## 2025-03-30 RX ADMIN — ENOXAPARIN SODIUM 40 MG: 40 INJECTION SUBCUTANEOUS at 19:58

## 2025-03-30 RX ADMIN — PANTOPRAZOLE SODIUM 40 MG: 40 TABLET, DELAYED RELEASE ORAL at 09:36

## 2025-03-30 RX ADMIN — ACETAMINOPHEN 650 MG: 325 TABLET, FILM COATED ORAL at 21:54

## 2025-03-30 RX ADMIN — LORATADINE 10 MG: 10 TABLET ORAL at 09:36

## 2025-03-30 RX ADMIN — GABAPENTIN 400 MG: 300 CAPSULE ORAL at 11:13

## 2025-03-30 RX ADMIN — SUCRALFATE ORAL 1 G: 1 SUSPENSION ORAL at 09:40

## 2025-03-30 RX ADMIN — Medication 1000 UNITS: at 09:36

## 2025-03-30 RX ADMIN — ACETAMINOPHEN 650 MG: 325 TABLET, FILM COATED ORAL at 11:13

## 2025-03-30 RX ADMIN — FAMOTIDINE 20 MG: 20 TABLET, FILM COATED ORAL at 09:36

## 2025-03-30 RX ADMIN — ACETAMINOPHEN 650 MG: 325 TABLET, FILM COATED ORAL at 16:44

## 2025-03-30 RX ADMIN — HYDROXYZINE HYDROCHLORIDE 50 MG: 25 TABLET, FILM COATED ORAL at 22:39

## 2025-03-30 RX ADMIN — ACETAMINOPHEN 650 MG: 325 TABLET, FILM COATED ORAL at 04:33

## 2025-03-30 RX ADMIN — PAROXETINE HYDROCHLORIDE 20 MG: 20 TABLET, FILM COATED ORAL at 09:36

## 2025-03-30 RX ADMIN — HYDROXYZINE HYDROCHLORIDE 25 MG: 25 TABLET, FILM COATED ORAL at 16:45

## 2025-03-30 NOTE — PLAN OF CARE
Goal Outcome Evaluation:      Plan of Care Reviewed With: patient    Overall Patient Progress: no change    Significant 24 hour events: None    Level of Care: Acute    Summary Type: 5A Report   Pain: Controlled with PRN tylenol x2, gabapentin x2, flexeril x 1.   Neuro:WDL  CV:WDL   Resp:.WDL except, cough - infrequent, non-productive cough   GI:WDL  Skin: .WDL except, integrity  Activity Assistance: independent  Safety/Falls Risk: Level of Risk: Moderate Risk  Consults: ENT, SLP  Procedures/Imaging: KAMRON Bx- 3/31 at 11AM   Precautions: Neutropenia   Diet: Regular

## 2025-03-30 NOTE — PLAN OF CARE
Goal Outcome Evaluation:      Plan of Care Reviewed With: patient    Overall Patient Progress: no change    Outcome Evaluation: [id number]    Afebrile. Pain managed w/ tylenol, atarax, and gabapentin. Went out to smoke several times. BMBx tomorrow.

## 2025-03-30 NOTE — PROGRESS NOTES
[facility]    Progress Note  Hematology / Oncology     Date of Admission:  2/26/2025  Hospital Day #: 32   Date of Service (when I saw the patient): 03/30/2025    Assessment & Plan   Alejandra Villegas is a 59 year old female with a past medical history significant for COPD, migraines, rheumatoid arthritis, aortic stenosis, Afib, and anxiety who presented to an outside hospital with cytopenias and was found on BMBx to have hypercellular marrow with 4% blasts, 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 She was subsequently admitted to [facility] on 2/26/25 for further work-up and management and pathology re-review was most consistent with AML with NPM1 mutation by WHO 2022 (which does not require a specific blast threshold). Following further multidisciplinary discussion, she started intensive induction with 7+3 (D1=3/5/25). Her course has been complicated by neutropenic fevers, epigastric pain, influenza A, AOM, and L TM perforation. Midpoint BMBx morphology showing no morphologic or immunophenotypic evidence of acute myeloid leukemia.     TODAY:  - D26 7+3 induction.   - Scheduled for a restaging BMBx on 3/31 at 11 AM. Pending BMBx results, plan to proceed with consolidation prior to discharge.  - Consider discontinuing levaquin in next 1-2 days with recovery of ANC. Likely continue posaconazole in setting of active smoking.  - Will schedule gabapentin given increased in sciatic pain, patient declines any reports of dizziness/lightheadedness.   - Continue best supportive cares.    HEME  # AML with NPM1 mutation  Had been seen by PCP Dec 2024 w/ progressive fatigue and nausea where she was found leukopenic WBC  2.4 and neutropenic w/ ANC 0.3. Hgb 12. Was referred to local hematology/oncology clinic for further evaluation and seen by Dr. Samina Harris. BMBx 2/10/25 done at OSH showed hypercellular marrow w/ trilineage hematopoiesis w/ dyspoiesis with mildly  elevated blasts of 4% on morphology. NGS w/ NPM1, IDH2, and NRAS mutations. She was admitted to [facility] 2/26/25 for further workup and management. Slides were re-reviewed by [facility], who noted hypercellular marrow with 8% blasts by morphology. Despite relatively low blast percentage, findings were felt most consistent with a diagnosis AML with NPM1 mutation by WHO 2022 (which does not require a specific blast threshold). Following interdepartmental discussions and review of the available literature, patient was started on intensive induction with 7+3 (Day 1=3/5/25).   - PICC placed 3/5/2025, plan to discontinue on discharge.   - Baseline cardiopulmonary studies:  - Echo w/ EF 60-65%, mild known aortic stenosis.   - EKG (2/27) with NSR, QTc 412  - CXR (2/26) with mildly increased streaky bibasilar opacities, atelectasis or edema. No consolidation.   - Baseline viral serologies: CMV IgG+, EBV IgG+, HepB sAg-, HepB cAb-, HepB sAb-, HSV1+/2+, HIV-  - HLA Typing sent on admission. Message sent to notify BMT team x2/27 for IP consult.   - Midcycle BMBx 3/19/25: Flow with no increase in myeloid blasts and no abnormal myeloid blast population. Morphology with no morphologic or immunophenotypic evidence of acute myeloid leukemia.   - D28 BMBx scheduled 3/31/25 at 11 AM. Pending BMBx results, plan to proceed with consolidation prior to discharge.               Treatment plan: 7+3 (C1D1=3/5/2025)  - Daunorubicin 60 mg/m  IV D1-3  - Cytarabine 100 mg/m  IV D1-7    Supportive medications: Zofran, dexamethasone 12 mg D1-3     # Pancytopenia  Secondary to underlying hematologic malignancy and exacerbated by recent chemotherapy.  - Follow daily CBC with differential  - Transfuse to keep Hgb >7, plt >10K  - Blood consent obtained 2/26/25 on admission and placed in patient's paper chart.      ID  # Neutropenic fever, resolved  # Influenza A, resolved  Episode #1:  2/25: On admission, patient noted subjective fever with  chills and rhinitis beginning 2/25 PM prior to admission. No other localizing s/sx of infection. She was afebrile on admission but spiked a low-grade fever to 100.7 on 2/26 PM. Blood and urine cultures negative, CXR with streaky opacities but no burt consolidation. Work-up positive for influenza A, and she completed a course of Tamiflu 75 mg BID x5 days (2/26-3/3). She also received a short empiric course of IV cefepime (2/26-3/1) given concurrent neutropenia.  Episode #2:  3/13: Febrile to 100.4 overnight x3/13. No localizing/new symptoms. OVSS. Patient was wearing heated jacket that she attributed to temperature, no documentation noted of notifying cross cover MD. No further ID workup (BCx, UA, UC, or CXR) completed and no antibiotic changes made. Given borderline temperature, decision made to continue monitoring and no further work-up was undertaken.  Episode #3:  3/19: Tmax 100.6 overnight. Asymptomatic, although notes sweating upon waking the following morning. Started IV Cefepime 2 g q8h (3/18-X). CXR with scattered patchy densities, especially in the lung bases. Respiratory panel negative. UA with protein and blood but otherwise bland, UCx negative. BCx NGTD. A CT C/A/P was done (for other reasons, as below) and notable for findings suggestive of esophagitis as well as possible bronchitis/small airways disease. She was treated with a 7-day course of IV cefepime (3/18-3/25) and fever did not recur.    # ID prophylaxis  - Acyclovir 400 mg BID  - Levaquin 500 mg daily  - Posaconazole 300 mg daily   - Vori w/ $1.60 copay, posa requiring PA also w/ $1.60 copay. Rotated from deny to vori (3/13-3/17) w/ completion of chemotherapy, then transitioned to posa x3/18 given visual hallucinations x3/17     GI  # GERD, improved  # Epigastric pain, resolved  Reported recent increase in symptoms in the days leading up to admission and initiated famotidine. Persistent/progressive symptoms throughout admission. Cardiac work-up  reassuring, lipase negative. Increased H2B to BID (3/9); given persistent symptoms, PPI started (3/14). Increased to BID (3/19). CT C/A/P with evidence of esophagitis (thought due to reflux/recent chemotherapy) and small hiatal hernia. Symptoms have improved on maximal medical therapy as below.  - Continue famotidine 20 mg BID  - Continue Protonix 40 mg BID  - GI cocktail PRN  - TUMS PRN  - GI consulted, EGD deferred for now as risks thought to outweigh the benefits in the setting of neutropenia; can reassess upon count recovery  - Continue to encourage lifestyle modifications: avoid straws, carbonated beverages, GERD precautions    # Risk of malnutrition  Secondary to esophagitis/GERD/mucositis picture as above, patient intake has decreased to small servings, mostly eating soup. On regular adult diet with thin liquids per SLP. Recommended trial of high caloric liquids, patient tried Strawberry Ensure and plans to continue to order with meals.  - RD following; appreciate recs     # Nausea/vomiting, improving  Patient noted ongoing nausea w/ occasional vomiting starting Dec 2024. Has been managed with PRN Zofran.  Endorsed nausea on admission, now resolved.   - PRN Zofran and compazine  - Could consider trial of PRN Zyprexa if 3rd agent is desired/required     PULM  # COPD  Longstanding history of COPD. On admission patient reports symptoms are manageable with no recent exacerbations. Most recent PFTs (2018) were normal.  - Continue PTA Breo Ellipta inhaler and PRN DuoNebs      CV  # Essential hypertension  - Continue PTA lisinopril - HELD 3/11 w/ soft BPs (90s-100s/50s-60s), resume pending daily trends    # Infrarenal abdominal aortic aneurysm  Noted incidentally on CT C/A/P (3/19/25), measuring 3.4 cm in diameter.  - Attention on follow-up imaging    RENAL/FEN  # Stage 2 CKD  Baseline Cr ~ 0.7. On admission Cr 0.77.  - Continue to trend on daily labs and encourage PO hydration     NEURO  # Chronic migraine w/o  aura  # Chronic headaches  Present since childhood. Reportedly triggered by neck manipulation/movement. Reportedly well-managed with regimen below. Headaches occurring throughout admission with daily APAP use, also chronic and managed with APAP PRN at home. Patient notes that her headaches throughout admission have been consistent with her typical daily headaches with no reported changes in character, quality, location, severity, or frequency. She denies new associated neurological changes. Considered LP to exclude CNS leukemia, but given this reassuring history and absence of other indications for LP (no hyperleukocytosis, no monocytic phenotype, no FLT3 mutation, etc), this was ultimately deferred.  - APAP PRN  - Continue PTA sumatriptan and cyclobenzaprine PRN  - Could reconsider need for LP if headache worsens/changes in character or quality in the future    MISC  # Maculopapular rash  Noted on 3/25. Pruritic, localized to the neck, right shoulder, and flexural area behind the right knee. Clinically, affected areas consist of coalescing erythematous macules with scattered papules. No obvious vesicles or bullae. DDx includes contact dermatitis (favored, possibly due to CHG wipes) versus atopic dermatitis (flexural areas raise suspicion for this) versus drug eruption versus other. Improved as of 3/26 with triamcinolone ointment.  - Continue triamcinolone ointment 0.1% BID to the affected areas  - Okay to defer CHG wipes given concern for contact dermatitis  - Monitor clinically    # BROOKS  # MDD  # At risk for adjustment disorder  Patient reports good control of anxiety/depressive symptoms prior to admission. Did note feeling overwhelmed by new diagnosis. Offered health psychology or cordelia, which she declined.  - Readdress health psych consult as indicated  - Continue PTA paroxetine   - Atarax as needed     # Tobacco use disorder  Has smoked since age 16, 1.5 ppd (roughly 65 pack years). Attempting to cut back as  recently as 01/2025 to 0.5 ppd. We discussed the risks of smoking during induction chemotherapy including increased risk for infection in setting of severe neutropenia that could further complicate course, placing her at risk for severe complications that could be life-threatening or even fatal.  - Encourage smoking cessation; patient continues to smoke at this time, despite understanding/acknowledgement of the risks.  - Trialed nicotine patch, then self-discontinued after reporting that she felt the nicotine patch precipitated an anxiety attack/episode of chest pain on 3/9. Offered a lower dose versus alternative form of NRT, which patient declined.      # Degenerative disc disease, L5-S1   - Continue PTA gabapentin 400 mg TID prn.   - Patient reported increased L sciatic pain, had taken gabapentin TID on 3/29 with improvement in symptoms. Will schedule gabapentin 400 mg TID per patient request.   - Continue to monitor symptoms    Chronic Problems:  # Vitamin D deficiency - Continue PTA vit D supplement  # Seasonal allergies - Claritin 10 mg daily  # Rheumatoid arthritis - Patient reported trying treatment though was unable to tolerate well. Managed with Tylenol PRN. Most recent RF >650 (2/10/25). JI negative.    # Prediabetes - Last A1c 6.0 x12/9/24. Has been managing with lifestyle modifications.   # Mixed hyperlipidemia - Lipid panel has remained at goal, 1/14/25 panel w/ cholesterol 163, , LDL 92, HDL 45.   - Held PTA atorvastatin on admission due to chemotherapy interactions. Consider rotation to rosuvastatin for better drug-drug interaction profile.   # H/o aortic stenosis - Patient noted on admission longstanding history of aortic stenosis. Pre-chemo echo w/ EF 60-65% and mild aortic stenosis and insufficiency. No previous echo to compare.     Resolved Hospital Problems:  # Mild hyponatremia, resolved - New mild hyponatremia noted 3/13 with Na 135 ? 130. Asymptomatic. Now resolved.  # Non-radiating  chest pain, resolved - for details see note from 3/21 and prior   # Urinary frequency + Dysuria, resolved - On admission, patient noted longstanding history of urinary frequency though new mild dysuria. UA (2/26) negative, UC with no growth. Symptoms have since resolved.  # R AOM with purulent effusion and Small L TM perforation, resolved - ENT consulted, appreciate recs. Completed 7-day course Levaquin 750mg daily and 5 day course of floxin drops to left ear for perforation     Clinically Significant Risk Factors               # Hypoalbuminemia: Lowest albumin = 3.2 g/dL at 3/24/2025  4:59 AM, will monitor as appropriate  # Coagulation Defect: INR = 1.19 (Ref range: 0.85 - 1.15) and/or PTT = 31 Seconds (Ref range: 22 - 38 Seconds), will monitor for bleeding    # Hypertension: Noted on problem list                  # Financial/Environmental Concerns: none  # Asthma: noted on problem list       Fluids/Electrolytes/Nutrition   - IVF bolus PRN   - PRN lyte replacement per standing protocol  - Regular diet as tolerated      Lines: PICC     PPX  VTE: Lovenox held 3/13 for TCP, resumed 3/28   GI: Pepcid, Protonix  Bowels: prn senna and miralax  Activity: as tolerated      Code Status: DNR/DNI    Disposition: Inpatient admission to [facility] service for treatment of AML; discharge to home pending count recovery and restaging BMBx, likely 7-10 more days.   Follow-up: Will follow with Dr. Harris as primary oncologist and go to [facility] for twice weekly labs/transfusions.      Medically Ready for Discharge: Anticipated in 5+ Days      Staffed with Dr. Jaimes.    I spent 45 minutes in the care of this patient today, which included time necessary for review of interval events, obtaining history and physical exam, ordering medication(s)/test(s) as medically indicated, discussion with interdisciplinary/consult team(s), care coordination, and documentation time.     Zully Garcia,  DESEAN  Hematology/Oncology  Pager: #[phone]     Interval History   No acute overnight events. Nursing notes reviewed. Afebrile and hemodynamically stable.  Feeling well this morning, coloring in coloring book.  States this helps keep her mind busy.  Denies any new symptoms.  Reports improvement in left sciatic pain after resuming gabapentin yesterday.  Symptoms improved with ambulation and heat. Otherwise denies headache, N/V, mouth sores, chest pain, palpitations, shortness of breath, abdominal pain or cramping, dysuria. Continues to move bowels.  Reviewed plan for BMBx tomorrow.  She is hopeful to start chemo soon as Tuesday with discharge Friday.  Discussed this will depend on turnaround of biopsy results, and the results themselves. She is excited to see her pets at home. Reviewed plan of care for today. All questions addressed.     A comprehensive review of symptoms was performed and was negative except as detailed in the interval history above.    Physical Exam   Vital Signs with Ranges  Temp:  [98.1  F (36.7  C)-99.2  F (37.3  C)] 98.1  F (36.7  C)  Pulse:  [86-90] 86  Resp:  [18] 18  BP: (114-131)/(66-80) 120/66  SpO2:  [92 %-96 %] 92 %    I/O last 3 completed shifts:  In: -   Out: 2800 [Urine:2800]    Vitals:    03/28/25 1054 03/29/25 0900 03/30/25 0930   Weight: 78.4 kg (172 lb 12.8 oz) 78.2 kg (172 lb 6.4 oz) 79.3 kg (174 lb 12.8 oz)     Constitutional: Pleasant and cooperative female, in NAD. Alert, interactive, non-toxic. Smiling and conversational.  HEENT: NC/AT, sclera clear, conjunctiva normal, MMM without lesion, thrush, or abscess; poor dentition.  Respiratory: No increased work of breathing. Breath sounds mildly diminished to the lung bases bilaterally. Slight inspiratory wheeze in b/l lung fields.   Cardiovascular: RRR, systolic murmur. No peripheral edema.   GI: Normal bowel sounds. Abdomen with central obesity, is soft, non-distended and non-tender to palpation.  Skin: Warm, dry, well-perfused. Did  not visualize rash today.   Musculoskeletal: Diminished muscle bulk, no gross deformities.  Neurologic: A&O. Answers questions appropriately, speech normal. Normal gait. Moves all extremities spontaneously.  Psychiatric: Normal mood and affect.  Vascular access:  PICC on RUE CDI, non-tender, no surrounding erythema.    Medications   Current Facility-Administered Medications   Medication Dose Route Frequency Provider Last Rate Last Admin     Current Facility-Administered Medications   Medication Dose Route Frequency Provider Last Rate Last Admin    acyclovir (ZOVIRAX) tablet 400 mg  400 mg Oral BID Chelsie Murphy PA-C   400 mg at 03/30/25 0936    enoxaparin ANTICOAGULANT (LOVENOX) injection 40 mg  40 mg Subcutaneous Q24H Nery Crowell PA-C   40 mg at 03/29/25 2103    famotidine (PEPCID) tablet 20 mg  20 mg Oral BID Zully Garcia PA-C   20 mg at 03/30/25 0936    fluticasone-vilanterol (BREO ELLIPTA) 100-25 MCG/ACT inhaler 1 puff  1 puff Inhalation Daily Chelsie Murphy PA-C   1 puff at 03/30/25 0941    gabapentin (NEURONTIN) capsule 400 mg  400 mg Oral TID Zully Garcia PA-C        heparin lock flush 10 unit/mL injection 5-20 mL  5-20 mL Intracatheter Q24H Chelsie Murphy PA-C   5 mL at 03/30/25 0519    levofloxacin (LEVAQUIN) tablet 500 mg  500 mg Oral Daily Chelsie Davila PA-C   500 mg at 03/30/25 0936    [Held by provider] lisinopril (ZESTRIL) tablet 10 mg  10 mg Oral Daily Jyoti Tenorio PA-C   10 mg at 03/10/25 1039    loratadine (CLARITIN) tablet 10 mg  10 mg Oral Daily Chelsie Murphy PA-C   10 mg at 03/30/25 0936    pantoprazole (PROTONIX) EC tablet 40 mg  40 mg Oral BID AC Nery Crowell PA-C   40 mg at 03/30/25 0936    PARoxetine (PAXIL) tablet 20 mg  20 mg Oral QAM Chelsie Murphy PA-C   20 mg at 03/30/25 0936    posaconazole (NOXAFIL) DR tablet TBEC 300 mg  300 mg Oral Nery Dubose PA-C    300 mg at 03/30/25 0936    sodium chloride (PF) 0.9% PF flush 10-40 mL  10-40 mL Intracatheter Q8H Chelsie Murphy PA-C   10 mL at 03/30/25 0519    sucralfate (CARAFATE) suspension 1 g  1 g Oral 4x Daily AC & HS Aman Cervantes MD   1 g at 03/30/25 0940    triamcinolone (KENALOG) 0.1 % ointment   Topical BID Chelsie Davila PA-C   Given at 03/28/25 1035    Vitamin D3 (CHOLECALCIFEROL) tablet 1,000 Units  1,000 Units Oral Daily Chelsie Murphy PA-C   1,000 Units at 03/30/25 0936     Antiinfectives  Anti-infectives (From now, onward)      Start     Dose/Rate Route Frequency Ordered Stop    03/25/25 2000  levofloxacin (LEVAQUIN) tablet 500 mg         500 mg Oral DAILY 03/24/25 0819      03/18/25 0800  posaconazole (NOXAFIL) DR tablet TBEC 300 mg         300 mg Oral EVERY MORNING 03/17/25 1242      02/26/25 2000  acyclovir (ZOVIRAX) tablet 400 mg         400 mg Oral 2 TIMES DAILY 02/26/25 1307            Data   CBC   Recent Labs   Lab 03/30/25  0932 03/29/25  0747 03/29/25  0431 03/28/25  0449 03/27/25  0500 03/26/25  0541 03/25/25  0407   WBC 4.6 3.0* 3.3* 2.0* 1.6* 1.0* 0.7*   RBC 2.61* 2.58* 2.57* 2.54* 2.59* 2.59* 2.07*   HGB 8.0* 8.1* 8.2* 7.7* 8.2* 8.1* 6.6*   HCT 24.0* 24.2* 24.3* 23.0* 24.1* 22.8* 18.7*   MCV 92 94 95 91 93 88 90   MCH 30.7 31.4 31.9 30.3 31.7 31.3 31.9   MCHC 33.3 33.5 33.7 33.5 34.0 35.5 35.3   RDW 15.4* 14.8 14.9 14.7 14.7 14.8 14.4   PLT  --   --   --  507* 317 161 65*     CMP   Recent Labs   Lab 03/30/25  0427 03/29/25  0431 03/28/25  0449 03/27/25  1722 03/27/25  0500    141 142  --  140   POTASSIUM 3.7 3.7 3.5 3.8 3.4   CHLORIDE 106 106 107  --  104   CO2 27 25 25  --  25   ANIONGAP 10 10 10  --  11   * 123* 114*  --  114*   BUN 8.8 10.4 7.5  --  10.3   CR 0.61 0.59 0.58  --  0.57   GFRESTIMATED >90 >90 >90  --  >90   TOBIN 8.7* 9.1 8.7*  --  8.9   MAG 2.2 2.3 2.2  --  2.2   PHOS 4.2 3.9 3.8  --  3.5   PROTTOTAL 6.5 6.6 6.3*  --   6.3*   ALBUMIN 3.3* 3.4* 3.4*  --  3.4*   BILITOTAL 0.2 0.2 0.2  --  0.2   ALKPHOS 125 125 116  --  118   AST 15 12 14  --  13   ALT 11 9 10  --  11     LFTs   Recent Labs   Lab 03/30/25  0427   PROTTOTAL 6.5   ALBUMIN 3.3*   BILITOTAL 0.2   ALKPHOS 125   AST 15   ALT 11     Coagulation Studies   Recent Labs   Lab 03/28/25  0449   INR 1.19*   PTT 31

## 2025-03-31 LAB
ABO + RH BLD: NORMAL
ALBUMIN SERPL BCG-MCNC: 3.5 G/DL (ref 3.5–5.2)
ALP SERPL-CCNC: 136 U/L (ref 40–150)
ALT SERPL W P-5'-P-CCNC: 13 U/L (ref 0–50)
ANION GAP SERPL CALCULATED.3IONS-SCNC: 13 MMOL/L (ref 7–15)
APTT PPP: 32 SECONDS (ref 22–38)
AST SERPL W P-5'-P-CCNC: 17 U/L (ref 0–45)
BASOPHILS # BLD AUTO: 0 10E3/UL (ref 0–0.2)
BASOPHILS # BLD AUTO: 0.1 10E3/UL (ref 0–0.2)
BASOPHILS # BLD MANUAL: 0 10E3/UL (ref 0–0.2)
BASOPHILS NFR BLD AUTO: 1 %
BASOPHILS NFR BLD AUTO: 1 %
BASOPHILS NFR BLD MANUAL: 1 %
BILIRUB SERPL-MCNC: 0.2 MG/DL
BLD GP AB SCN SERPL QL: NEGATIVE
BUN SERPL-MCNC: 10.6 MG/DL (ref 6–20)
CALCIUM SERPL-MCNC: 8.8 MG/DL (ref 8.8–10.4)
CHLORIDE SERPL-SCNC: 101 MMOL/L (ref 98–107)
CREAT SERPL-MCNC: 0.61 MG/DL (ref 0.51–0.95)
EGFRCR SERPLBLD CKD-EPI 2021: >90 ML/MIN/1.73M2
EOSINOPHIL # BLD AUTO: 0 10E3/UL (ref 0–0.7)
EOSINOPHIL # BLD AUTO: 0 10E3/UL (ref 0–0.7)
EOSINOPHIL # BLD MANUAL: 0 10E3/UL (ref 0–0.7)
EOSINOPHIL NFR BLD AUTO: 0 %
EOSINOPHIL NFR BLD AUTO: 0 %
EOSINOPHIL NFR BLD MANUAL: 1 %
ERYTHROCYTE [DISTWIDTH] IN BLOOD BY AUTOMATED COUNT: 15.4 % (ref 10–15)
ERYTHROCYTE [DISTWIDTH] IN BLOOD BY AUTOMATED COUNT: 15.6 % (ref 10–15)
FIBRINOGEN PPP-MCNC: 746 MG/DL (ref 170–510)
GLUCOSE SERPL-MCNC: 116 MG/DL (ref 70–99)
HCO3 SERPL-SCNC: 25 MMOL/L (ref 22–29)
HCT VFR BLD AUTO: 23.8 % (ref 35–47)
HCT VFR BLD AUTO: 24 % (ref 35–47)
HGB BLD-MCNC: 7.9 G/DL (ref 11.7–15.7)
HGB BLD-MCNC: 8 G/DL (ref 11.7–15.7)
IMM GRANULOCYTES # BLD: 0.1 10E3/UL
IMM GRANULOCYTES # BLD: 0.1 10E3/UL
IMM GRANULOCYTES NFR BLD: 2 %
IMM GRANULOCYTES NFR BLD: 2 %
INR PPP: 1.14 (ref 0.85–1.15)
INTERPRETATION SERPL IEP-IMP: NORMAL
LAB TEST RESULTS REPORTED IN RPTPERIOD: NORMAL
LDH SERPL L TO P-CCNC: 323 U/L (ref 0–250)
LYMPHOCYTES # BLD AUTO: 1.1 10E3/UL (ref 0.8–5.3)
LYMPHOCYTES # BLD AUTO: 1.3 10E3/UL (ref 0.8–5.3)
LYMPHOCYTES # BLD MANUAL: 0.9 10E3/UL (ref 0.8–5.3)
LYMPHOCYTES NFR BLD AUTO: 21 %
LYMPHOCYTES NFR BLD AUTO: 25 %
LYMPHOCYTES NFR BLD MANUAL: 31 %
MAGNESIUM SERPL-MCNC: 2.1 MG/DL (ref 1.7–2.3)
MCH RBC QN AUTO: 30.7 PG (ref 26.5–33)
MCH RBC QN AUTO: 31.1 PG (ref 26.5–33)
MCHC RBC AUTO-ENTMCNC: 33.2 G/DL (ref 31.5–36.5)
MCHC RBC AUTO-ENTMCNC: 33.3 G/DL (ref 31.5–36.5)
MCV RBC AUTO: 92 FL (ref 78–100)
MCV RBC AUTO: 94 FL (ref 78–100)
MONOCYTES # BLD AUTO: 1.3 10E3/UL (ref 0–1.3)
MONOCYTES # BLD AUTO: 1.7 10E3/UL (ref 0–1.3)
MONOCYTES # BLD MANUAL: 0.8 10E3/UL (ref 0–1.3)
MONOCYTES NFR BLD AUTO: 28 %
MONOCYTES NFR BLD AUTO: 29 %
MONOCYTES NFR BLD MANUAL: 25 %
NEUTROPHILS # BLD AUTO: 2.1 10E3/UL (ref 1.6–8.3)
NEUTROPHILS # BLD AUTO: 2.8 10E3/UL (ref 1.6–8.3)
NEUTROPHILS # BLD MANUAL: 1.2 10E3/UL (ref 1.6–8.3)
NEUTROPHILS NFR BLD AUTO: 45 %
NEUTROPHILS NFR BLD AUTO: 47 %
NEUTROPHILS NFR BLD MANUAL: 39 %
NRBC # BLD AUTO: 0 10E3/UL
NRBC # BLD AUTO: 0.1 10E3/UL
NRBC # BLD AUTO: 0.2 10E3/UL
NRBC BLD AUTO-RTO: 2 /100
NRBC BLD AUTO-RTO: 3 /100
NRBC BLD MANUAL-RTO: 1 %
OTHER CELLS # BLD MANUAL: 0.1 10E3/UL
OTHER CELLS NFR BLD MANUAL: 3 %
PATH REV: ABNORMAL
PHOSPHATE SERPL-MCNC: 4 MG/DL (ref 2.5–4.5)
PLAT MORPH BLD: ABNORMAL
PLAT MORPH BLD: ABNORMAL
PLATELET # BLD AUTO: 1316 10E3/UL (ref 150–450)
PLATELET # BLD AUTO: 1451 10E3/UL (ref 150–450)
POLYCHROMASIA BLD QL SMEAR: SLIGHT
POLYCHROMASIA BLD QL SMEAR: SLIGHT
POTASSIUM SERPL-SCNC: 3.6 MMOL/L (ref 3.4–5.3)
PROT SERPL-MCNC: 6.7 G/DL (ref 6.4–8.3)
RBC # BLD AUTO: 2.54 10E6/UL (ref 3.8–5.2)
RBC # BLD AUTO: 2.61 10E6/UL (ref 3.8–5.2)
RBC MORPH BLD: ABNORMAL
RBC MORPH BLD: ABNORMAL
SEQUENCING METHOD PNL BLD/T: NORMAL
SODIUM SERPL-SCNC: 139 MMOL/L (ref 135–145)
SPECIMEN EXP DATE BLD: NORMAL
SPECIMEN TYPE: NORMAL
URATE SERPL-MCNC: 3.2 MG/DL (ref 2.4–5.7)
WBC # BLD AUTO: 4.6 10E3/UL (ref 4–11)
WBC # BLD AUTO: 5.9 10E3/UL (ref 4–11)

## 2025-03-31 PROCEDURE — 88342 IMHCHEM/IMCYTCHM 1ST ANTB: CPT | Mod: 26 | Performed by: STUDENT IN AN ORGANIZED HEALTH CARE EDUCATION/TRAINING PROGRAM

## 2025-03-31 PROCEDURE — 84460 ALANINE AMINO (ALT) (SGPT): CPT

## 2025-03-31 PROCEDURE — 85384 FIBRINOGEN ACTIVITY: CPT | Performed by: PHYSICIAN ASSISTANT

## 2025-03-31 PROCEDURE — 85060 BLOOD SMEAR INTERPRETATION: CPT | Performed by: STUDENT IN AN ORGANIZED HEALTH CARE EDUCATION/TRAINING PROGRAM

## 2025-03-31 PROCEDURE — 88341 IMHCHEM/IMCYTCHM EA ADD ANTB: CPT | Mod: 26 | Performed by: STUDENT IN AN ORGANIZED HEALTH CARE EDUCATION/TRAINING PROGRAM

## 2025-03-31 PROCEDURE — 85097 BONE MARROW INTERPRETATION: CPT | Performed by: STUDENT IN AN ORGANIZED HEALTH CARE EDUCATION/TRAINING PROGRAM

## 2025-03-31 PROCEDURE — 84100 ASSAY OF PHOSPHORUS: CPT | Performed by: PHYSICIAN ASSISTANT

## 2025-03-31 PROCEDURE — 83615 LACTATE (LD) (LDH) ENZYME: CPT

## 2025-03-31 PROCEDURE — 120N000002 HC R&B MED SURG/OB UMMC

## 2025-03-31 PROCEDURE — 38222 DX BONE MARROW BX & ASPIR: CPT | Mod: RT | Performed by: PHYSICIAN ASSISTANT

## 2025-03-31 PROCEDURE — 88189 FLOWCYTOMETRY/READ 16 & >: CPT | Mod: GC | Performed by: STUDENT IN AN ORGANIZED HEALTH CARE EDUCATION/TRAINING PROGRAM

## 2025-03-31 PROCEDURE — 88291 CYTO/MOLECULAR REPORT: CPT | Performed by: MEDICAL GENETICS

## 2025-03-31 PROCEDURE — 250N000011 HC RX IP 250 OP 636

## 2025-03-31 PROCEDURE — 85610 PROTHROMBIN TIME: CPT | Performed by: PHYSICIAN ASSISTANT

## 2025-03-31 PROCEDURE — 85025 COMPLETE CBC W/AUTO DIFF WBC: CPT

## 2025-03-31 PROCEDURE — 83735 ASSAY OF MAGNESIUM: CPT

## 2025-03-31 PROCEDURE — 88341 IMHCHEM/IMCYTCHM EA ADD ANTB: CPT | Mod: TC

## 2025-03-31 PROCEDURE — 82947 ASSAY GLUCOSE BLOOD QUANT: CPT

## 2025-03-31 PROCEDURE — 88311 DECALCIFY TISSUE: CPT | Mod: 26 | Performed by: STUDENT IN AN ORGANIZED HEALTH CARE EDUCATION/TRAINING PROGRAM

## 2025-03-31 PROCEDURE — 250N000013 HC RX MED GY IP 250 OP 250 PS 637: Performed by: PHYSICIAN ASSISTANT

## 2025-03-31 PROCEDURE — G0452 MOLECULAR PATHOLOGY INTERPR: HCPCS | Mod: 26 | Performed by: PATHOLOGY

## 2025-03-31 PROCEDURE — 84550 ASSAY OF BLOOD/URIC ACID: CPT | Performed by: PHYSICIAN ASSISTANT

## 2025-03-31 PROCEDURE — 99233 SBSQ HOSP IP/OBS HIGH 50: CPT | Mod: FS | Performed by: STUDENT IN AN ORGANIZED HEALTH CARE EDUCATION/TRAINING PROGRAM

## 2025-03-31 PROCEDURE — 250N000013 HC RX MED GY IP 250 OP 250 PS 637: Performed by: STUDENT IN AN ORGANIZED HEALTH CARE EDUCATION/TRAINING PROGRAM

## 2025-03-31 PROCEDURE — 88237 TISSUE CULTURE BONE MARROW: CPT

## 2025-03-31 PROCEDURE — 99418 PROLNG IP/OBS E/M EA 15 MIN: CPT | Performed by: STUDENT IN AN ORGANIZED HEALTH CARE EDUCATION/TRAINING PROGRAM

## 2025-03-31 PROCEDURE — 250N000013 HC RX MED GY IP 250 OP 250 PS 637: Performed by: HOSPITALIST

## 2025-03-31 PROCEDURE — 999N000044 HC STATISTIC CVC DRESSING CHANGE

## 2025-03-31 PROCEDURE — 88342 IMHCHEM/IMCYTCHM 1ST ANTB: CPT | Mod: TC

## 2025-03-31 PROCEDURE — 88305 TISSUE EXAM BY PATHOLOGIST: CPT | Mod: 26 | Performed by: STUDENT IN AN ORGANIZED HEALTH CARE EDUCATION/TRAINING PROGRAM

## 2025-03-31 PROCEDURE — 250N000013 HC RX MED GY IP 250 OP 250 PS 637

## 2025-03-31 PROCEDURE — 88275 CYTOGENETICS 100-300: CPT

## 2025-03-31 PROCEDURE — 81450 HL NEO GSAP 5-50DNA/DNA&RNA: CPT

## 2025-03-31 PROCEDURE — 88185 FLOWCYTOMETRY/TC ADD-ON: CPT

## 2025-03-31 PROCEDURE — 85730 THROMBOPLASTIN TIME PARTIAL: CPT | Performed by: PHYSICIAN ASSISTANT

## 2025-03-31 PROCEDURE — 86900 BLOOD TYPING SEROLOGIC ABO: CPT

## 2025-03-31 PROCEDURE — 88271 CYTOGENETICS DNA PROBE: CPT

## 2025-03-31 RX ORDER — HALOPERIDOL 0.5 MG/1
2 TABLET ORAL
Status: COMPLETED | OUTPATIENT
Start: 2025-03-31 | End: 2025-03-31

## 2025-03-31 RX ADMIN — HYDROXYZINE HYDROCHLORIDE 50 MG: 25 TABLET, FILM COATED ORAL at 21:12

## 2025-03-31 RX ADMIN — ACYCLOVIR 400 MG: 400 TABLET ORAL at 08:41

## 2025-03-31 RX ADMIN — PANTOPRAZOLE SODIUM 40 MG: 40 TABLET, DELAYED RELEASE ORAL at 08:41

## 2025-03-31 RX ADMIN — MIDAZOLAM 1 MG: 1 INJECTION INTRAMUSCULAR; INTRAVENOUS at 11:03

## 2025-03-31 RX ADMIN — SUCRALFATE ORAL 1 G: 1 SUSPENSION ORAL at 16:15

## 2025-03-31 RX ADMIN — HYDROXYZINE HYDROCHLORIDE 50 MG: 25 TABLET, FILM COATED ORAL at 13:19

## 2025-03-31 RX ADMIN — HEPARIN, PORCINE (PF) 10 UNIT/ML INTRAVENOUS SYRINGE 10 ML: at 20:41

## 2025-03-31 RX ADMIN — Medication 5 ML: at 04:53

## 2025-03-31 RX ADMIN — GABAPENTIN 400 MG: 300 CAPSULE ORAL at 08:41

## 2025-03-31 RX ADMIN — GABAPENTIN 400 MG: 300 CAPSULE ORAL at 13:19

## 2025-03-31 RX ADMIN — FAMOTIDINE 20 MG: 20 TABLET, FILM COATED ORAL at 20:42

## 2025-03-31 RX ADMIN — POSACONAZOLE 300 MG: 100 TABLET, DELAYED RELEASE ORAL at 08:40

## 2025-03-31 RX ADMIN — FAMOTIDINE 20 MG: 20 TABLET, FILM COATED ORAL at 08:41

## 2025-03-31 RX ADMIN — ACETAMINOPHEN 650 MG: 325 TABLET, FILM COATED ORAL at 16:14

## 2025-03-31 RX ADMIN — ENOXAPARIN SODIUM 40 MG: 40 INJECTION SUBCUTANEOUS at 20:41

## 2025-03-31 RX ADMIN — Medication 1000 UNITS: at 08:41

## 2025-03-31 RX ADMIN — SUCRALFATE ORAL 1 G: 1 SUSPENSION ORAL at 08:45

## 2025-03-31 RX ADMIN — GABAPENTIN 400 MG: 300 CAPSULE ORAL at 20:41

## 2025-03-31 RX ADMIN — HEPARIN, PORCINE (PF) 10 UNIT/ML INTRAVENOUS SYRINGE 5 ML: at 13:19

## 2025-03-31 RX ADMIN — PANTOPRAZOLE SODIUM 40 MG: 40 TABLET, DELAYED RELEASE ORAL at 16:14

## 2025-03-31 RX ADMIN — LEVOFLOXACIN 500 MG: 500 TABLET, FILM COATED ORAL at 08:41

## 2025-03-31 RX ADMIN — HALOPERIDOL 2 MG: 0.5 TABLET ORAL at 23:45

## 2025-03-31 RX ADMIN — SUCRALFATE ORAL 1 G: 1 SUSPENSION ORAL at 21:12

## 2025-03-31 RX ADMIN — HYDROXYZINE HYDROCHLORIDE 50 MG: 25 TABLET, FILM COATED ORAL at 04:24

## 2025-03-31 RX ADMIN — SUCRALFATE ORAL 1 G: 1 SUSPENSION ORAL at 13:19

## 2025-03-31 RX ADMIN — ACETAMINOPHEN 650 MG: 325 TABLET, FILM COATED ORAL at 09:57

## 2025-03-31 RX ADMIN — CYCLOBENZAPRINE HYDROCHLORIDE 10 MG: 5 TABLET, FILM COATED ORAL at 17:03

## 2025-03-31 RX ADMIN — FLUTICASONE FUROATE AND VILANTEROL TRIFENATATE 1 PUFF: 100; 25 POWDER RESPIRATORY (INHALATION) at 08:40

## 2025-03-31 RX ADMIN — ACYCLOVIR 400 MG: 400 TABLET ORAL at 20:42

## 2025-03-31 RX ADMIN — LORATADINE 10 MG: 10 TABLET ORAL at 08:41

## 2025-03-31 RX ADMIN — ACETAMINOPHEN 650 MG: 325 TABLET, FILM COATED ORAL at 03:45

## 2025-03-31 RX ADMIN — PAROXETINE HYDROCHLORIDE 20 MG: 20 TABLET, FILM COATED ORAL at 08:41

## 2025-03-31 NOTE — PLAN OF CARE
Goal Outcome Evaluation:      Plan of Care Reviewed With: patient    Overall Patient Progress: no change    Significant 24 hour events: None    Level of Care: Acute    Summary Type: 5A Report   Pain: Controlled with PRN tylenol x1, atarax x1.   Neuro:WDL  CV:WDL  Resp:.WDL except, cough - infrequent, non-productive cough   GI:WDL  Skin: .WDL except, integrity  Activity Assistance: independent  Safety/Falls Risk: Level of Risk: Moderate Risk  Consults: ENT, SLP  Procedures/Imaging: BM Bx- 3/31 at 11AM   Precautions: Neutropenia   Diet: Regular

## 2025-03-31 NOTE — PLAN OF CARE
Goal Outcome Evaluation:    Plan of Care Reviewed With: patient    Overall Patient Progress: no change    Outcome Evaluation: [id number]    A&Ox4, VSS on RA. BMBx @ 1100, tolerated well with 1mg versed. Went outside to smoke several times. Tylenol given for hip pain. Atarax given for anxiety. Tolerating regular diet. Vascular consulted for pinching pain with PICC. Up ad vanda.

## 2025-03-31 NOTE — PLAN OF CARE
Cares 7452-8451:  VSS, afebrile, on room. Denies nausea. Pain was hard to manage: admin tylenol x1 for aching L back pain (baseline), admin flexeril x1 for dull cramping back pain continued after pt went for walk and had tyenol. Pt up ad vanda independent with steady gait. Went for walk outside and returned to pt room independently. Calls approp.    Neuro: WDL  CV: WDL  Resp: WDL except cough that's infreq, nonproductive cough  GI: WDL  Skin: WDL except integrity (abd bruising, R side biopsy)  MSK: independent and steady gait,   Procedures: 3/31 BM Bx at 11 am         Continue plan of care.      Problem: Oncology Care  Goal: Optimal Pain Control and Function  Outcome: Not Progressing  Intervention: Develop Pain Management Plan  Recent Flowsheet Documentation  Taken 3/31/2025 1700 by Stepan Rolle RN  Pain Management Interventions: (flexeril admin since unrekieved by tylenol) medication (see MAR)  Taken 3/31/2025 1612 by Stepan Rolle RN  Pain Management Interventions: (Tylnol admin, heat pack, and rest) medication (see MAR)  Intervention: Prevent or Manage Pain  Recent Flowsheet Documentation  Taken 3/31/2025 1545 by Stepan Rolle RN  Sensory Stimulation Regulation:   lighting decreased   quiet environment promoted   auditory stimulation minimized  Sleep/Rest Enhancement: awakenings minimized  Bowel Elimination Promotion:   adequate fluid intake promoted   ambulation promoted  Medication Review/Management: medications reviewed  Intervention: Optimize Psychosocial Wellbeing  Recent Flowsheet Documentation  Taken 3/31/2025 1545 by Stepan Rolle RN  Supportive Measures:   self-responsibility promoted   self-care encouraged  Diversional Activities:   coloring/artwork   smartphone     Problem: Adult Inpatient Plan of Care  Goal: Optimal Comfort and Wellbeing  Outcome: Progressing  Intervention: Monitor Pain and Promote Comfort  Recent Flowsheet Documentation  Taken 3/31/2025 1700 by Sariah  Stepan TOUSSAINT, RN  Pain Management Interventions: (flexeril admin since unrekieved by tylenol) medication (see MAR)  Taken 3/31/2025 1612 by Stepan Rolle, RN  Pain Management Interventions: (Tylnol admin, heat pack, and rest) medication (see MAR)   Goal Outcome Evaluation:

## 2025-03-31 NOTE — PROGRESS NOTES
[facility]    Progress Note  Hematology / Oncology     Date of Admission:  2/26/2025  Hospital Day #: 33   Date of Service (when I saw the patient): 03/31/2025    Assessment & Plan   Alejandra Villegas is a 59 year old female with a past medical history significant for COPD, migraines, rheumatoid arthritis, aortic stenosis, Afib, and anxiety who presented to an outside hospital with cytopenias and was found on BMBx to have hypercellular marrow with 4% blasts, 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 She was subsequently admitted to [facility] on 2/26/25 for further work-up and management and pathology re-review was most consistent with AML with NPM1 mutation by WHO 2022 (which does not require a specific blast threshold). Following further multidisciplinary discussion, she started intensive induction with 7+3 (D1=3/5/25). Her course has been complicated by neutropenic fevers, epigastric pain, influenza A, AOM, and L TM perforation. Midpoint BMBx morphology showing no morphologic or immunophenotypic evidence of acute myeloid leukemia.     TODAY:  - D27 7+3 induction.   - Restaging BMBx completed today. Follow flow, morph, cytogenetics, molecular. Pending BMBx results, plan to proceed with consolidation prior to discharge.   - Discontinue levofloxacin given ANC recovery. Continue posaconazole in setting of active smoking.   - Continue scheduled gabapentin given increased in sciatic pain.  - Continue best supportive cares.    HEME  # AML with NPM1 mutation  Had been seen by PCP Dec 2024 w/ progressive fatigue and nausea where she was found leukopenic WBC  2.4 and neutropenic w/ ANC 0.3. Hgb 12. Was referred to local hematology/oncology clinic for further evaluation and seen by Dr. Samina Harris. BMBx 2/10/25 done at OSH showed hypercellular marrow w/ trilineage hematopoiesis w/ dyspoiesis with mildly elevated blasts of 4% on morphology. NGS w/ NPM1, IDH2,  and NRAS mutations. She was admitted to [facility] 2/26/25 for further workup and management. Slides were re-reviewed by [facility], who noted hypercellular marrow with 8% blasts by morphology. Despite relatively low blast percentage, findings were felt most consistent with a diagnosis AML with NPM1 mutation by WHO 2022 (which does not require a specific blast threshold). Following interdepartmental discussions and review of the available literature, patient was started on intensive induction with 7+3 (Day 1=3/5/25).   - PICC placed 3/5/2025, plan to discontinue on discharge.   - Baseline cardiopulmonary studies:  - Echo w/ EF 60-65%, mild known aortic stenosis.   - EKG (2/27) with NSR, QTc 412  - CXR (2/26) with mildly increased streaky bibasilar opacities, atelectasis or edema. No consolidation.   - Baseline viral serologies: CMV IgG+, EBV IgG+, HepB sAg-, HepB cAb-, HepB sAb-, HSV1+/2+, HIV-  - HLA Typing sent on admission. Message sent to notify BMT team x2/27 for IP consult.   - Midcycle BMBx 3/19/25: Flow with no increase in myeloid blasts and no abnormal myeloid blast population. Morphology with no morphologic or immunophenotypic evidence of acute myeloid leukemia.   - D28 BMBx completed 3/31/25. Pending BMBx results, plan to proceed with consolidation prior to discharge.              - Follow flow, morph, cytogenetics, molecular.    Treatment plan: 7+3 (C1D1=3/5/2025)  - Daunorubicin 60 mg/m  IV D1-3  - Cytarabine 100 mg/m  IV D1-7    Supportive medications: Zofran, dexamethasone 12 mg D1-3     # Pancytopenia  Secondary to underlying hematologic malignancy and exacerbated by recent chemotherapy.  - Follow daily CBC with differential  - Transfuse to keep Hgb >7, plt >10K  - Blood consent obtained 2/26/25 on admission and placed in patient's paper chart.      ID  # Neutropenic fever, resolved  # Influenza A, resolved  Episode #1:  2/25: On admission, patient noted subjective fever with chills and rhinitis  beginning 2/25 PM prior to admission. No other localizing s/sx of infection. She was afebrile on admission but spiked a low-grade fever to 100.7 on 2/26 PM. Blood and urine cultures negative, CXR with streaky opacities but no burt consolidation. Work-up positive for influenza A, and she completed a course of Tamiflu 75 mg BID x5 days (2/26-3/3). She also received a short empiric course of IV cefepime (2/26-3/1) given concurrent neutropenia.  Episode #2:  3/13: Febrile to 100.4 overnight x3/13. No localizing/new symptoms. OVSS. Patient was wearing heated jacket that she attributed to temperature, no documentation noted of notifying cross cover MD. No further ID workup (BCx, UA, UC, or CXR) completed and no antibiotic changes made. Given borderline temperature, decision made to continue monitoring and no further work-up was undertaken.  Episode #3:  3/19: Tmax 100.6 overnight. Asymptomatic, although notes sweating upon waking the following morning. Started IV Cefepime 2 g q8h (3/18-X). CXR with scattered patchy densities, especially in the lung bases. Respiratory panel negative. UA with protein and blood but otherwise bland, UCx negative. BCx NGTD. A CT C/A/P was done (for other reasons, as below) and notable for findings suggestive of esophagitis as well as possible bronchitis/small airways disease. She was treated with a 7-day course of IV cefepime (3/18-3/25) and fever did not recur.    # ID prophylaxis  - Acyclovir 400 mg BID  - Levaquin 500 mg daily - when ANC <1.0  - Posaconazole 300 mg daily - continue given active smoker  - Vori w/ $1.60 copay, posa requiring PA also w/ $1.60 copay. Rotated from deny to vori (3/13-3/17) w/ completion of chemotherapy, then transitioned to posa x3/18 given visual hallucinations x3/17     GI  # GERD, improved  # Epigastric pain, resolved  Reported recent increase in symptoms in the days leading up to admission and initiated famotidine. Persistent/progressive symptoms throughout  admission. Cardiac work-up reassuring, lipase negative. Increased H2B to BID (3/9); given persistent symptoms, PPI started (3/14). Increased to BID (3/19). CT C/A/P with evidence of esophagitis (thought due to reflux/recent chemotherapy) and small hiatal hernia. Symptoms have improved on maximal medical therapy as below.  - Continue famotidine 20 mg BID  - Continue Protonix 40 mg BID  - GI cocktail PRN  - TUMS PRN  - GI consulted, EGD deferred for now as risks thought to outweigh the benefits in the setting of neutropenia; can reassess upon count recovery  - Continue to encourage lifestyle modifications: avoid straws, carbonated beverages, GERD precautions    # Risk of malnutrition  Secondary to esophagitis/GERD/mucositis picture as above, patient intake has decreased to small servings, mostly eating soup. On regular adult diet with thin liquids per SLP. Recommended trial of high caloric liquids, patient tried Strawberry Ensure and plans to continue to order with meals.  - RD following; appreciate recs     # Nausea/vomiting, improving  Patient noted ongoing nausea w/ occasional vomiting starting Dec 2024. Has been managed with PRN Zofran.  Endorsed nausea on admission, now resolved.   - PRN Zofran and compazine  - Could consider trial of PRN Zyprexa if 3rd agent is desired/required     PULM  # COPD  Longstanding history of COPD. On admission patient reports symptoms are manageable with no recent exacerbations. Most recent PFTs (2018) were normal.  - Continue PTA Breo Ellipta inhaler and PRN DuoNebs      CV  # Essential hypertension  - PTA lisinopril 10 mg daily HELD 3/11 w/ soft BPs (90s-100s/50s-60s), resume pending daily trends    # Infrarenal abdominal aortic aneurysm  Noted incidentally on CT C/A/P (3/19/25), measuring 3.4 cm in diameter.  - Attention on follow-up imaging    RENAL/FEN  # Stage 2 CKD  Baseline Cr ~ 0.7. On admission Cr 0.77.  - Continue to trend on daily labs and encourage PO hydration      NEURO  # Chronic migraine w/o aura  # Chronic headaches  Present since childhood. Reportedly triggered by neck manipulation/movement. Reportedly well-managed with regimen below. Headaches occurring throughout admission with daily APAP use, also chronic and managed with APAP PRN at home. Patient notes that her headaches throughout admission have been consistent with her typical daily headaches with no reported changes in character, quality, location, severity, or frequency. She denies new associated neurological changes. Considered LP to exclude CNS leukemia, but given this reassuring history and absence of other indications for LP (no hyperleukocytosis, no monocytic phenotype, no FLT3 mutation, etc), this was ultimately deferred.  - APAP PRN  - Continue PTA sumatriptan and cyclobenzaprine PRN  - Could reconsider need for LP if headache worsens/changes in character or quality in the future    MISC  # Maculopapular rash  Noted on 3/25. Pruritic, localized to the neck, right shoulder, and flexural area behind the right knee. Clinically, affected areas consist of coalescing erythematous macules with scattered papules. No obvious vesicles or bullae. DDx includes contact dermatitis (favored, possibly due to CHG wipes) versus atopic dermatitis (flexural areas raise suspicion for this) versus drug eruption versus other. Improved as of 3/26 with triamcinolone ointment.  - Continue triamcinolone ointment 0.1% BID to the affected areas  - Okay to defer CHG wipes given concern for contact dermatitis  - Monitor clinically    # BROOKS  # MDD  # At risk for adjustment disorder  Patient reports good control of anxiety/depressive symptoms prior to admission. Did note feeling overwhelmed by new diagnosis. Offered health psychology or cordelia, which she declined.  - Readdress health psych consult as indicated  - Continue PTA paroxetine   - Atarax as needed     # Tobacco use disorder  Has smoked since age 16, 1.5 ppd (roughly 65 pack  years). Attempting to cut back as recently as 01/2025 to 0.5 ppd. We discussed the risks of smoking during induction chemotherapy including increased risk for infection in setting of severe neutropenia that could further complicate course, placing her at risk for severe complications that could be life-threatening or even fatal.  - Encourage smoking cessation; patient continues to smoke at this time, despite understanding/acknowledgement of the risks.  - Trialed nicotine patch, then self-discontinued after reporting that she felt the nicotine patch precipitated an anxiety attack/episode of chest pain on 3/9. Offered a lower dose versus alternative form of NRT, which patient declined.      # Degenerative disc disease, L5-S1   - PTA gabapentin 400 mg TID prn.   - Patient reported increased L sciatic pain, had taken gabapentin TID on 3/29 with improvement in symptoms. Will schedule gabapentin 400 mg TID per patient request. Improving as of 3/31.  - Continue to monitor symptoms    Chronic Problems:  # Vitamin D deficiency - Continue PTA vit D supplement  # Seasonal allergies - Claritin 10 mg daily  # Rheumatoid arthritis - Patient reported trying treatment though was unable to tolerate well. Managed with Tylenol PRN. Most recent RF >650 (2/10/25). JI negative.    # Prediabetes - Last A1c 6.0 x12/9/24. Has been managing with lifestyle modifications.   # Mixed hyperlipidemia - Lipid panel has remained at goal, 1/14/25 panel w/ cholesterol 163, , LDL 92, HDL 45.   - Held PTA atorvastatin on admission due to chemotherapy interactions. Consider rotation to rosuvastatin for better drug-drug interaction profile.   # H/o aortic stenosis - Patient noted on admission longstanding history of aortic stenosis. Pre-chemo echo w/ EF 60-65% and mild aortic stenosis and insufficiency. No previous echo to compare.     Resolved Hospital Problems:  # Mild hyponatremia, resolved - New mild hyponatremia noted 3/13 with Na 135 ? 130.  Asymptomatic. Now resolved.  # Non-radiating chest pain, resolved - for details see note from 3/21 and prior   # Urinary frequency + Dysuria, resolved - On admission, patient noted longstanding history of urinary frequency though new mild dysuria. UA (2/26) negative, UC with no growth. Symptoms have since resolved.  # R AOM with purulent effusion and Small L TM perforation, resolved - ENT consulted, appreciate recs. Completed 7-day course Levaquin 750mg daily and 5 day course of floxin drops to left ear for perforation     Clinically Significant Risk Factors               # Hypoalbuminemia: Lowest albumin = 3.2 g/dL at 3/24/2025  4:59 AM, will monitor as appropriate     # Hypertension: Noted on problem list                  # Financial/Environmental Concerns: none  # Asthma: noted on problem list       Fluids/Electrolytes/Nutrition   - IVF bolus PRN   - PRN lyte replacement per standing protocol  - Regular diet as tolerated      Lines: PICC     PPX  VTE: Lovenox held 3/13 for TCP, resumed 3/28   GI: Pepcid, Protonix  Bowels: prn senna and miralax  Activity: as tolerated      Code Status: DNR/DNI    Disposition: Inpatient admission to [facility] for treatment of AML; discharge to home pending count recovery and restaging BMBx, likely 7-10 more days.   Follow-up: Will follow with Dr. Harris as primary oncologist and go to [facility] for twice weekly labs/transfusions.      Medically Ready for Discharge: Anticipated in 5+ Days      Staffed with Dr. Jaimes.    I spent 100 minutes in the care of this patient today, which included time necessary for review of interval events, obtaining history and physical exam, ordering medication(s)/test(s) as medically indicated, discussion with interdisciplinary/consult team(s), care coordination, and documentation time.     Andrey Crowell PA-C  Hematology/Oncology  Pager [phone]    Interval History   No acute overnight events. Nursing notes reviewed.  Afebrile and hemodynamically stable.      Alejandra is feeling well this morning. Decidedly, she is hopeful to be out of the hospital by the end of the week. Addressed patient expectations and reviewed tentative timeline of bone marrow results. Reviewed bone marrow biopsy procedure and obtained consent. Patient reports improvement of L sciatic pain with scheduled gabapentin and denies any residual epigastric discomfort.  Denies chest pain, abdominal pain, headache, vision changes, peripheral edema, shortness of breath. Last bowel movement yesterday and formed. Appetite is intact. All concerns were addressed and questions were answered.     A comprehensive review of symptoms was performed and was negative except as detailed in the interval history above.    Physical Exam   Vital Signs with Ranges  Temp:  [98.1  F (36.7  C)-99.4  F (37.4  C)] 99.4  F (37.4  C)  Pulse:  [79-86] 86  Resp:  [18-20] 19  BP: (111-125)/(60-82) 125/82  SpO2:  [89 %-95 %] 94 %    I/O last 3 completed shifts:  In: 840 [P.O.:840]  Out: 1250 [Urine:1250]    Vitals:    03/28/25 1054 03/29/25 0900 03/30/25 0930   Weight: 78.4 kg (172 lb 12.8 oz) 78.2 kg (172 lb 6.4 oz) 79.3 kg (174 lb 12.8 oz)     Constitutional: Pleasant and cooperative female, in NAD. Alert, interactive, non-toxic. Smiling and conversational.  HEENT: NC/AT, sclera clear, conjunctiva normal, MMM without lesion, thrush, or abscess; poor dentition.  Respiratory: No increased work of breathing. Breath sounds mildly diminished to the lung bases bilaterally. Slight inspiratory wheeze in b/l lung fields.   Cardiovascular: RRR, systolic murmur. No peripheral edema.   GI: Normal bowel sounds. Abdomen with central obesity, is soft, non-distended and non-tender to palpation.  Skin: Warm, dry, well-perfused.   Musculoskeletal: Diminished muscle bulk, no gross deformities.  Neurologic: A&O. Answers questions appropriately, speech normal. Normal gait. Moves all extremities  spontaneously.  Psychiatric: Normal mood and affect.  Vascular access:  PICC on RUE CDI, non-tender, no surrounding erythema.    Medications   Current Facility-Administered Medications   Medication Dose Route Frequency Provider Last Rate Last Admin     Current Facility-Administered Medications   Medication Dose Route Frequency Provider Last Rate Last Admin    acyclovir (ZOVIRAX) tablet 400 mg  400 mg Oral BID Chelsie Murphy PA-C   400 mg at 03/30/25 1958    enoxaparin ANTICOAGULANT (LOVENOX) injection 40 mg  40 mg Subcutaneous Q24H Nery Crowell PA-C   40 mg at 03/30/25 1958    famotidine (PEPCID) tablet 20 mg  20 mg Oral BID Zully Garcia PA-C   20 mg at 03/30/25 1958    fluticasone-vilanterol (BREO ELLIPTA) 100-25 MCG/ACT inhaler 1 puff  1 puff Inhalation Daily Chelsie Murphy PA-C   1 puff at 03/30/25 0941    gabapentin (NEURONTIN) capsule 400 mg  400 mg Oral TID Zully Garcia PA-C   400 mg at 03/30/25 1959    heparin lock flush 10 unit/mL injection 5-20 mL  5-20 mL Intracatheter Q24H Chelsie Murphy PA-C   5 mL at 03/31/25 0453    levofloxacin (LEVAQUIN) tablet 500 mg  500 mg Oral Daily Chelsie Davila PA-C   500 mg at 03/30/25 0936    [Held by provider] lisinopril (ZESTRIL) tablet 10 mg  10 mg Oral Daily Jyoti Tenorio PA-C   10 mg at 03/10/25 1039    loratadine (CLARITIN) tablet 10 mg  10 mg Oral Daily Chelsie Murphy PA-C   10 mg at 03/30/25 0936    pantoprazole (PROTONIX) EC tablet 40 mg  40 mg Oral BID  Nery Crowell PA-C   40 mg at 03/30/25 1645    PARoxetine (PAXIL) tablet 20 mg  20 mg Oral M Chelsie Murphy PA-C   20 mg at 03/30/25 0936    posaconazole (NOXAFIL) DR tablet TBEC 300 mg  300 mg Oral QABarton County Memorial HospitalMervat, Nery L, PA-C   300 mg at 03/30/25 0936    sodium chloride (PF) 0.9% PF flush 10-40 mL  10-40 mL Intracatheter Q8H Chelsie Murphy PA-C   10 mL at 03/31/25 0454    sucralfate  (CARAFATE) suspension 1 g  1 g Oral 4x Daily AC & HS Aman Cervantes MD   1 g at 03/30/25 2154    triamcinolone (KENALOG) 0.1 % ointment   Topical BID Chelsie Davila PA-C   Given at 03/28/25 1035    Vitamin D3 (CHOLECALCIFEROL) tablet 1,000 Units  1,000 Units Oral Daily Chelsie Murphy PA-C   1,000 Units at 03/30/25 0936     Antiinfectives  Anti-infectives (From now, onward)      Start     Dose/Rate Route Frequency Ordered Stop    03/25/25 2000  levofloxacin (LEVAQUIN) tablet 500 mg         500 mg Oral DAILY 03/24/25 0819      03/18/25 0800  posaconazole (NOXAFIL) DR tablet TBEC 300 mg         300 mg Oral EVERY MORNING 03/17/25 1242      02/26/25 2000  acyclovir (ZOVIRAX) tablet 400 mg         400 mg Oral 2 TIMES DAILY 02/26/25 1307            Data   CBC   Recent Labs   Lab 03/31/25  0353 03/30/25  0932 03/29/25  0747 03/29/25  0431 03/28/25  0449 03/27/25  0500 03/26/25  0541 03/25/25  0407   WBC 5.9 4.6 3.0* 3.3* 2.0* 1.6* 1.0* 0.7*   RBC 2.54* 2.61* 2.58* 2.57* 2.54* 2.59* 2.59* 2.07*   HGB 7.9* 8.0* 8.1* 8.2* 7.7* 8.2* 8.1* 6.6*   HCT 23.8* 24.0* 24.2* 24.3* 23.0* 24.1* 22.8* 18.7*   MCV 94 92 94 95 91 93 88 90   MCH 31.1 30.7 31.4 31.9 30.3 31.7 31.3 31.9   MCHC 33.2 33.3 33.5 33.7 33.5 34.0 35.5 35.3   RDW 15.6* 15.4* 14.8 14.9 14.7 14.7 14.8 14.4   PLT  --   --   --   --  507* 317 161 65*     CMP   Recent Labs   Lab 03/31/25  0353 03/30/25  0427 03/29/25  0431 03/28/25  0449    143 141 142   POTASSIUM 3.6 3.7 3.7 3.5   CHLORIDE 101 106 106 107   CO2 25 27 25 25   ANIONGAP 13 10 10 10   * 110* 123* 114*   BUN 10.6 8.8 10.4 7.5   CR 0.61 0.61 0.59 0.58   GFRESTIMATED >90 >90 >90 >90   TOBIN 8.8 8.7* 9.1 8.7*   MAG 2.1 2.2 2.3 2.2   PHOS 4.0 4.2 3.9 3.8   PROTTOTAL 6.7 6.5 6.6 6.3*   ALBUMIN 3.5 3.3* 3.4* 3.4*   BILITOTAL 0.2 0.2 0.2 0.2   ALKPHOS 136 125 125 116   AST 17 15 12 14   ALT 13 11 9 10     LFTs   Recent Labs   Lab 03/31/25 0353   PROTTOTAL 6.7    ALBUMIN 3.5   BILITOTAL 0.2   ALKPHOS 136   AST 17   ALT 13     Coagulation Studies   Recent Labs   Lab 03/31/25  0353   INR 1.14   PTT 32

## 2025-03-31 NOTE — PROGRESS NOTES
CLINICAL NUTRITION SERVICES - REASSESSMENT NOTE     Registered Dietitian Interventions:  Meal slips provided  Ensure Enlive TID with straw (1st choice) or vanilla (2nd choice)  Modified diet to High Kcal/High Protein diet to allow for greater variety (multiple entrees that can be combined)  Provided meal slips to use in cafeteria    Future/Additional Recommendations:  Monitor weight/intake trends at follow-up, as able.      INFORMATION OBTAINED  Assessed patient in room.    CURRENT NUTRITION ORDERS  Diet: Regular  Snacks/Supplements:  PRN    CURRENT INTAKE/TOLERANCE  Alejandra noted overall good appetite and utilizing Ensure to mimic small frequent meals. Alejandra stated preference to have Ensure scheduled. Notes hx of taste changes with treatments, but states not an issue at this time. Alejandra also commented getting food from cafeteria and vending machines occasionally to increase variety.      NEW FINDINGS  GI symptoms: Last BM noted 3/30.   Skin/wounds: Delmer score 22 with nutrition noted as adequate.   Nutrition-relevant labs: Reviewed  Nutrition-relevant medications: Reviewed   - Vit D3    Weight:   Wt Readings from Last Encounters:   03/31/25 80.7 kg (178 lb)   02/13/25 79.4 kg (175 lb)   02/10/25 79.7 kg (175 lb 9.6 oz)   02/06/25 77.7 kg (171 lb 6.4 oz)   01/14/25 83.9 kg (185 lb)   12/19/24 78.5 kg (173 lb)   12/09/24 81.2 kg (179 lb)   11/22/24 83.7 kg (184 lb 9.6 oz)   09/27/24 83.2 kg (183 lb 6.4 oz)   09/23/24 82.6 kg (182 lb 1.6 oz)   09/20/24 81.9 kg (180 lb 8 oz)       MALNUTRITION  % Intake: Decreased intake does not meet criteria  % Weight Loss: Weight loss does not meet criteria   Subcutaneous Fat Loss: Orbital: Mild and Buccal: Mild  Muscle Loss: Temples (temporalis muscle): Mild and Interosseous muscles: Mild  Fluid Accumulation/Edema: None noted  Malnutrition Diagnosis: Moderate malnutrition in the context of acute illness or injury  Malnutrition Present on Admission: No    EVALUATION OF THE  PROGRESS TOWARD GOALS   Previous Goals  1. Patient to consume % of nutritionally adequate meal trays TID, or the equivalent with supplements/snacks.  2. Weight maintenance >77 kg  Evaluation: Met    Previous Nutrition Diagnosis  Predicted inadequate nutrient intake related to anticipated prolonged hospitalization as evidenced by hypermetabolic disease state, expected LOS of >20 days, good appetite/intakes currently   Evaluation: Improving    NUTRITION DIAGNOSIS  Predicted inadequate nutrient intake related to anticipated prolonged hospitalization as evidenced by expected LOS of >20 days, good appetite/intakes currently     INTERVENTIONS  Manage composition of oral intake  Medical food supplement therapy    GOALS  1. Patient to consume % of nutritionally adequate meal trays TID, or the equivalent with supplements/snacks.  2. Weight maintenance >77 kg     MONITORING/EVALUATION  Progress toward goals will be monitored and evaluated per policy.

## 2025-03-31 NOTE — PROCEDURES
Bone Marrow Biopsy Procedure Note  Alejandra Villegas  March 31, 2025    PROCEDURE:  Unilateral Bone Marrow Biopsy    INDICATION:  AML restaging    PERFORMED BY:  Andrey Crowell PA-C     CONSENT:  Informed consent was obtained from the patient. The risks and benefits of the procedure were explained. The patient agreed to undergo the procedure. The consent form was signed and placed in the chart.    PROCEDURE SUMMARY:  The patient's identification was positively verified by verbal identification. Following the administration of 1 mg IV Versed for comfort, the patient was laid in the prone position. The right posterior iliac crest was prepped and draped in a sterile manner. The skin, deeper tissues, and periosteum of the RPIC were anesthetized with approximately 15mL 1% lidocaine. Following this, a 3mm incision was made. The Jamshidi needle was advanced into the RPIC bone cavity and a 15mm core biopsy was taken. Bone marrow aspirates were also obtained from the RPIC; approximately 20mL of marrow were aspirated. Following the procedure, a sterile pressure dressing was applied to the bone marrow biopsy site.     COMPLICATIONS:  None. The patient tolerated the procedure well with minimal discomfort.      RECOMMENDATIONS:   The patient was placed in the supine position to maintain pressure on the biopsy site.   The patient was instructed to lie flat for 60 minutes and not remove dressing or bathe/shower for 24 hours post-procedure.     TESTS ORDERED:  Morphology  Flow cytometry  Chromosomes  FISH   NGS    Nery Crowell PA-C  Hematology/Oncology  Pager: [phone]

## 2025-04-01 ENCOUNTER — APPOINTMENT (OUTPATIENT)
Dept: GENERAL RADIOLOGY | Facility: CLINIC | Age: 60
End: 2025-04-01
Attending: STUDENT IN AN ORGANIZED HEALTH CARE EDUCATION/TRAINING PROGRAM
Payer: MEDICARE

## 2025-04-01 LAB
ALBUMIN SERPL BCG-MCNC: 3.5 G/DL (ref 3.5–5.2)
ALBUMIN UR-MCNC: NEGATIVE MG/DL
ALP SERPL-CCNC: 158 U/L (ref 40–150)
ALT SERPL W P-5'-P-CCNC: 18 U/L (ref 0–50)
ANION GAP SERPL CALCULATED.3IONS-SCNC: 13 MMOL/L (ref 7–15)
APPEARANCE UR: CLEAR
AST SERPL W P-5'-P-CCNC: 25 U/L (ref 0–45)
BACTERIA #/AREA URNS HPF: ABNORMAL /HPF
BASOPHILS # BLD MANUAL: 0.1 10E3/UL (ref 0–0.2)
BASOPHILS NFR BLD MANUAL: 2 %
BILIRUB SERPL-MCNC: 0.2 MG/DL
BILIRUB UR QL STRIP: NEGATIVE
BUN SERPL-MCNC: 7.9 MG/DL (ref 6–20)
CALCIUM SERPL-MCNC: 8.8 MG/DL (ref 8.8–10.4)
CHLORIDE SERPL-SCNC: 99 MMOL/L (ref 98–107)
COLOR UR AUTO: ABNORMAL
CREAT SERPL-MCNC: 0.68 MG/DL (ref 0.51–0.95)
CRYPTOC AG SPEC QL: NEGATIVE
EGFRCR SERPLBLD CKD-EPI 2021: >90 ML/MIN/1.73M2
EOSINOPHIL # BLD MANUAL: 0 10E3/UL (ref 0–0.7)
EOSINOPHIL NFR BLD MANUAL: 0 %
ERYTHROCYTE [DISTWIDTH] IN BLOOD BY AUTOMATED COUNT: 16.1 % (ref 10–15)
GLUCOSE SERPL-MCNC: 114 MG/DL (ref 70–99)
GLUCOSE UR STRIP-MCNC: NEGATIVE MG/DL
HCO3 SERPL-SCNC: 26 MMOL/L (ref 22–29)
HCT VFR BLD AUTO: 22.3 % (ref 35–47)
HGB BLD-MCNC: 7.4 G/DL (ref 11.7–15.7)
HGB UR QL STRIP: NEGATIVE
INTERPRETATION SERPL IEP-IMP: NORMAL
KETONES UR STRIP-MCNC: NEGATIVE MG/DL
LDH SERPL L TO P-CCNC: 324 U/L (ref 0–250)
LEUKOCYTE ESTERASE UR QL STRIP: NEGATIVE
LYMPHOCYTES # BLD MANUAL: 1.6 10E3/UL (ref 0.8–5.3)
LYMPHOCYTES NFR BLD MANUAL: 21 %
MAGNESIUM SERPL-MCNC: 2.1 MG/DL (ref 1.7–2.3)
MCH RBC QN AUTO: 30.7 PG (ref 26.5–33)
MCHC RBC AUTO-ENTMCNC: 33.2 G/DL (ref 31.5–36.5)
MCV RBC AUTO: 93 FL (ref 78–100)
MONOCYTES # BLD MANUAL: 0.9 10E3/UL (ref 0–1.3)
MONOCYTES NFR BLD MANUAL: 13 %
MYELOCYTES # BLD MANUAL: 0.1 10E3/UL
MYELOCYTES NFR BLD MANUAL: 1 %
NEUTROPHILS # BLD MANUAL: 4.8 10E3/UL (ref 1.6–8.3)
NEUTROPHILS NFR BLD MANUAL: 64 %
NITRATE UR QL: NEGATIVE
NRBC # BLD AUTO: 0.2 10E3/UL
NRBC BLD MANUAL-RTO: 3 %
PATH REPORT.COMMENTS IMP SPEC: NORMAL
PATH REPORT.FINAL DX SPEC: NORMAL
PATH REPORT.FINAL DX SPEC: NORMAL
PATH REPORT.GROSS SPEC: NORMAL
PATH REPORT.MICROSCOPIC SPEC OTHER STN: NORMAL
PATH REPORT.RELEVANT HX SPEC: NORMAL
PATH REPORT.RELEVANT HX SPEC: NORMAL
PH UR STRIP: 7.5 [PH] (ref 5–7)
PHOSPHATE SERPL-MCNC: 3.4 MG/DL (ref 2.5–4.5)
PLAT MORPH BLD: ABNORMAL
PLATELET # BLD AUTO: 1538 10E3/UL (ref 150–450)
PLATELET # BLD AUTO: 581 10E3/UL (ref 150–450)
POLYCHROMASIA BLD QL SMEAR: SLIGHT
POTASSIUM SERPL-SCNC: 3.6 MMOL/L (ref 3.4–5.3)
PROT SERPL-MCNC: 6.6 G/DL (ref 6.4–8.3)
RBC # BLD AUTO: 2.41 10E6/UL (ref 3.8–5.2)
RBC MORPH BLD: ABNORMAL
RBC URINE: 0 /HPF
SODIUM SERPL-SCNC: 138 MMOL/L (ref 135–145)
SP GR UR STRIP: 1.01 (ref 1–1.03)
SPECIMEN TYPE: NORMAL
SQUAMOUS EPITHELIAL: <1 /HPF
URATE SERPL-MCNC: 3.3 MG/DL (ref 2.4–5.7)
UROBILINOGEN UR STRIP-MCNC: NORMAL MG/DL
WBC # BLD AUTO: 7.5 10E3/UL (ref 4–11)
WBC URINE: <1 /HPF

## 2025-04-01 PROCEDURE — 250N000013 HC RX MED GY IP 250 OP 250 PS 637

## 2025-04-01 PROCEDURE — 83735 ASSAY OF MAGNESIUM: CPT

## 2025-04-01 PROCEDURE — 85027 COMPLETE CBC AUTOMATED: CPT

## 2025-04-01 PROCEDURE — 87086 URINE CULTURE/COLONY COUNT: CPT

## 2025-04-01 PROCEDURE — 71045 X-RAY EXAM CHEST 1 VIEW: CPT | Mod: 26 | Performed by: RADIOLOGY

## 2025-04-01 PROCEDURE — 87385 HISTOPLASMA CAPSUL AG IA: CPT

## 2025-04-01 PROCEDURE — 87449 NOS EACH ORGANISM AG IA: CPT

## 2025-04-01 PROCEDURE — 84100 ASSAY OF PHOSPHORUS: CPT | Performed by: PHYSICIAN ASSISTANT

## 2025-04-01 PROCEDURE — 71045 X-RAY EXAM CHEST 1 VIEW: CPT

## 2025-04-01 PROCEDURE — 99233 SBSQ HOSP IP/OBS HIGH 50: CPT | Mod: FS | Performed by: STUDENT IN AN ORGANIZED HEALTH CARE EDUCATION/TRAINING PROGRAM

## 2025-04-01 PROCEDURE — 250N000011 HC RX IP 250 OP 636: Mod: JZ | Performed by: STUDENT IN AN ORGANIZED HEALTH CARE EDUCATION/TRAINING PROGRAM

## 2025-04-01 PROCEDURE — 250N000013 HC RX MED GY IP 250 OP 250 PS 637: Performed by: HOSPITALIST

## 2025-04-01 PROCEDURE — 250N000011 HC RX IP 250 OP 636

## 2025-04-01 PROCEDURE — 87899 AGENT NOS ASSAY W/OPTIC: CPT

## 2025-04-01 PROCEDURE — 99418 PROLNG IP/OBS E/M EA 15 MIN: CPT | Performed by: STUDENT IN AN ORGANIZED HEALTH CARE EDUCATION/TRAINING PROGRAM

## 2025-04-01 PROCEDURE — 80053 COMPREHEN METABOLIC PANEL: CPT

## 2025-04-01 PROCEDURE — 81001 URINALYSIS AUTO W/SCOPE: CPT | Performed by: STUDENT IN AN ORGANIZED HEALTH CARE EDUCATION/TRAINING PROGRAM

## 2025-04-01 PROCEDURE — 85007 BL SMEAR W/DIFF WBC COUNT: CPT

## 2025-04-01 PROCEDURE — 120N000002 HC R&B MED SURG/OB UMMC

## 2025-04-01 PROCEDURE — 36415 COLL VENOUS BLD VENIPUNCTURE: CPT

## 2025-04-01 PROCEDURE — 87040 BLOOD CULTURE FOR BACTERIA: CPT

## 2025-04-01 PROCEDURE — 83615 LACTATE (LD) (LDH) ENZYME: CPT

## 2025-04-01 PROCEDURE — 85049 AUTOMATED PLATELET COUNT: CPT

## 2025-04-01 PROCEDURE — 84550 ASSAY OF BLOOD/URIC ACID: CPT | Performed by: PHYSICIAN ASSISTANT

## 2025-04-01 PROCEDURE — 87305 ASPERGILLUS AG IA: CPT

## 2025-04-01 RX ORDER — CEFEPIME HYDROCHLORIDE 2 G/1
2 INJECTION, POWDER, FOR SOLUTION INTRAVENOUS EVERY 8 HOURS
Status: DISCONTINUED | OUTPATIENT
Start: 2025-04-01 | End: 2025-04-03

## 2025-04-01 RX ADMIN — LORATADINE 10 MG: 10 TABLET ORAL at 10:26

## 2025-04-01 RX ADMIN — HYDROXYZINE HYDROCHLORIDE 50 MG: 25 TABLET, FILM COATED ORAL at 12:35

## 2025-04-01 RX ADMIN — FLUTICASONE FUROATE AND VILANTEROL TRIFENATATE 1 PUFF: 100; 25 POWDER RESPIRATORY (INHALATION) at 10:26

## 2025-04-01 RX ADMIN — ACETAMINOPHEN 650 MG: 325 TABLET, FILM COATED ORAL at 18:18

## 2025-04-01 RX ADMIN — POSACONAZOLE 300 MG: 100 TABLET, DELAYED RELEASE ORAL at 10:26

## 2025-04-01 RX ADMIN — SUCRALFATE ORAL 1 G: 1 SUSPENSION ORAL at 17:23

## 2025-04-01 RX ADMIN — GABAPENTIN 400 MG: 300 CAPSULE ORAL at 10:26

## 2025-04-01 RX ADMIN — PANTOPRAZOLE SODIUM 40 MG: 40 TABLET, DELAYED RELEASE ORAL at 17:23

## 2025-04-01 RX ADMIN — CEFEPIME 2 G: 2 INJECTION, POWDER, FOR SOLUTION INTRAVENOUS at 13:26

## 2025-04-01 RX ADMIN — CEFEPIME 2 G: 2 INJECTION, POWDER, FOR SOLUTION INTRAVENOUS at 05:31

## 2025-04-01 RX ADMIN — FAMOTIDINE 20 MG: 20 TABLET, FILM COATED ORAL at 19:55

## 2025-04-01 RX ADMIN — SUCRALFATE ORAL 1 G: 1 SUSPENSION ORAL at 12:34

## 2025-04-01 RX ADMIN — HEPARIN, PORCINE (PF) 10 UNIT/ML INTRAVENOUS SYRINGE 5 ML: at 14:09

## 2025-04-01 RX ADMIN — PAROXETINE HYDROCHLORIDE 20 MG: 20 TABLET, FILM COATED ORAL at 10:26

## 2025-04-01 RX ADMIN — CYCLOBENZAPRINE HYDROCHLORIDE 10 MG: 5 TABLET, FILM COATED ORAL at 21:14

## 2025-04-01 RX ADMIN — SUCRALFATE ORAL 1 G: 1 SUSPENSION ORAL at 21:14

## 2025-04-01 RX ADMIN — ACYCLOVIR 400 MG: 400 TABLET ORAL at 19:54

## 2025-04-01 RX ADMIN — FAMOTIDINE 20 MG: 20 TABLET, FILM COATED ORAL at 10:26

## 2025-04-01 RX ADMIN — ENOXAPARIN SODIUM 40 MG: 40 INJECTION SUBCUTANEOUS at 19:55

## 2025-04-01 RX ADMIN — HEPARIN, PORCINE (PF) 10 UNIT/ML INTRAVENOUS SYRINGE 5 ML: at 22:00

## 2025-04-01 RX ADMIN — ACYCLOVIR 400 MG: 400 TABLET ORAL at 10:26

## 2025-04-01 RX ADMIN — Medication 1000 UNITS: at 10:26

## 2025-04-01 RX ADMIN — SUCRALFATE ORAL 1 G: 1 SUSPENSION ORAL at 10:26

## 2025-04-01 RX ADMIN — Medication 5 ML: at 04:33

## 2025-04-01 RX ADMIN — ACETAMINOPHEN 650 MG: 325 TABLET, FILM COATED ORAL at 12:35

## 2025-04-01 RX ADMIN — PANTOPRAZOLE SODIUM 40 MG: 40 TABLET, DELAYED RELEASE ORAL at 10:26

## 2025-04-01 RX ADMIN — GABAPENTIN 400 MG: 300 CAPSULE ORAL at 19:54

## 2025-04-01 RX ADMIN — HEPARIN, PORCINE (PF) 10 UNIT/ML INTRAVENOUS SYRINGE 5 ML: at 21:59

## 2025-04-01 RX ADMIN — CEFEPIME 2 G: 2 INJECTION, POWDER, FOR SOLUTION INTRAVENOUS at 21:14

## 2025-04-01 RX ADMIN — GABAPENTIN 400 MG: 300 CAPSULE ORAL at 14:09

## 2025-04-01 RX ADMIN — ACETAMINOPHEN 650 MG: 325 TABLET, FILM COATED ORAL at 04:33

## 2025-04-01 NOTE — PLAN OF CARE
19:00- 07:00  Goal Outcome Evaluation: new neutropenic fever (101.4)      Plan of Care Reviewed With: patient  Overall Patient Progress: no change     Pain: c/o L hip managed by scheduled gabapentin ,PRN atarax and haldol  Neuro:WDL  CV:WDL  Resp:.WDL except, cough - infrequent, non-productive cough , denies SOB  GI:WDL, LBM 3/30  : WDL, voiding spontaneously w/ good UO.   Skin: .WDL except, integrity- bruise in abdomen, BM Bx site covered w/ pressure dressing, CDI.   Activity Assistance: independent  Safety/Falls Risk: Level of Risk: Low Risk    Had a spike in temperature (101.4), blood cultures taken, Provider made aware, other VSS on RA. Provider ordered chest xray and started on IV cefepime. Patient still needs UA collection. Went outside to smoke several times during shift. Double lumen PICC hep locked. Continue w/ POC.

## 2025-04-01 NOTE — PROGRESS NOTES
[facility]    Progress Note  Hematology / Oncology     Date of Admission:  2/26/2025  Hospital Day #: 34   Date of Service (when I saw the patient): 04/01/2025    Assessment & Plan   Alejandra Villegas is a 59 year old female with a past medical history significant for COPD, migraines, rheumatoid arthritis, aortic stenosis, Afib, and anxiety who presented to an outside hospital with cytopenias and was found on BMBx to have hypercellular marrow with 4% blasts, 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 She was subsequently admitted to [facility] on 2/26/25 for further work-up and management and pathology re-review was most consistent with AML with NPM1 mutation by WHO 2022 (which does not require a specific blast threshold). Following further multidisciplinary discussion, she started intensive induction with 7+3 (D1=3/5/25). Her course has been complicated by neutropenic fevers, epigastric pain, influenza A, AOM, and L TM perforation. Midpoint BMBx morphology showing no morphologic or immunophenotypic evidence of acute myeloid leukemia.     TODAY:  - D28 7+3 induction.   - Restaging BMBx completed 3/31. Follow flow, morph, cytogenetics, molecular. Pending BMBx results, plan to proceed with consolidation prior to discharge.   - Febrile Tmax 101.4 overnight, resolved with Tylenol x1. Patient continues to have nonproductive cough, otherwise asymptomatic. CXR with diffuse hazy opacities, UA bland, UCx and BCx pending. Fungal serologies ordered. Started on Cefepime (3/31-x).  - Continue best supportive cares.    HEME  # AML with NPM1 mutation  Had been seen by PCP Dec 2024 w/ progressive fatigue and nausea where she was found leukopenic WBC  2.4 and neutropenic w/ ANC 0.3. Hgb 12. Was referred to local hematology/oncology clinic for further evaluation and seen by Dr. Samina Harris. BMBx 2/10/25 done at OSH showed hypercellular marrow w/ trilineage hematopoiesis  w/ dyspoiesis with mildly elevated blasts of 4% on morphology. NGS w/ NPM1, IDH2, and NRAS mutations. She was admitted to [facility] 2/26/25 for further workup and management. Slides were re-reviewed by [facility] Hematopathology, who noted hypercellular marrow with 8% blasts by morphology. Despite relatively low blast percentage, findings were felt most consistent with a diagnosis AML with NPM1 mutation by WHO 2022 (which does not require a specific blast threshold). Following interdepartmental discussions and review of the available literature, patient was started on intensive induction with 7+3 (Day 1=3/5/25).   - PICC placed 3/5/2025, plan to discontinue on discharge.   - Baseline cardiopulmonary studies:  - Echo w/ EF 60-65%, mild known aortic stenosis.   - EKG (2/27) with NSR, QTc 412  - CXR (2/26) with mildly increased streaky bibasilar opacities, atelectasis or edema. No consolidation.   - Baseline viral serologies: CMV IgG+, EBV IgG+, HepB sAg-, HepB cAb-, HepB sAb-, HSV1+/2+, HIV-  - HLA Typing sent on admission. Message sent to notify BMT team x2/27 for IP consult.   - Midcycle BMBx 3/19/25: Flow with no increase in myeloid blasts and no abnormal myeloid blast population. Morphology with no morphologic or immunophenotypic evidence of acute myeloid leukemia.   - D28 BMBx completed 3/31/25. Pending BMBx results, plan to proceed with consolidation prior to discharge.              - Follow flow, morph, cytogenetics, molecular.    Treatment plan: 7+3 (C1D1=3/5/2025)  - Daunorubicin 60 mg/m  IV D1-3  - Cytarabine 100 mg/m  IV D1-7    Supportive medications: Zofran, dexamethasone 12 mg D1-3     # Pancytopenia  Secondary to underlying hematologic malignancy and exacerbated by recent chemotherapy.  - Follow daily CBC with differential  - Transfuse to keep Hgb >7, plt >10K  - Blood consent obtained 2/26/25 on admission and placed in patient's paper chart.      ID  # Neutropenic fever, resolved  # Influenza A, resolved  Episode  #1:  2/25: On admission, patient noted subjective fever with chills and rhinitis beginning 2/25 PM prior to admission. No other localizing s/sx of infection. She was afebrile on admission but spiked a low-grade fever to 100.7 on 2/26 PM. Blood and urine cultures negative, CXR with streaky opacities but no burt consolidation. Work-up positive for influenza A, and she completed a course of Tamiflu 75 mg BID x5 days (2/26-3/3). She also received a short empiric course of IV cefepime (2/26-3/1) given concurrent neutropenia.  Episode #2:  3/13: Febrile to 100.4 overnight x3/13. No localizing/new symptoms. OVSS. Patient was wearing heated jacket that she attributed to temperature, no documentation noted of notifying cross cover MD. No further ID workup (BCx, UA, UC, or CXR) completed and no antibiotic changes made. Given borderline temperature, decision made to continue monitoring and no further work-up was undertaken.  Episode #3:  3/19: Tmax 100.6 overnight. Asymptomatic, although notes sweating upon waking the following morning. CXR with scattered patchy densities, especially in the lung bases. Respiratory panel negative. UA with protein and blood but otherwise bland, UCx negative. BCx NGTD. A CT C/A/P was done (for other reasons, as below) and notable for findings suggestive of esophagitis as well as possible bronchitis/small airways disease. She was treated with a 7-day course of IV cefepime (3/18-3/25) and fever did not recur.  Episode #4:  3/31: Tmax 101.4 overnight. Nonproductive cough, otherwise no new focal infectious symptoms. CXR with diffuse hazy opacities, UA/UCx to be collected, BCx pending. Started on Cefepime (3/31-x).  - monitor for recurrent fevers    # ID prophylaxis  - Acyclovir 400 mg BID  - Levaquin 500 mg daily - when ANC <1.0  - Posaconazole 300 mg daily - HELD for possible consolidation - resume on discharge given active smoker   - Vori w/ $1.60 copay, posa requiring PA also w/ $1.60 copay.  Rotated from deny to vori (3/13-3/17) w/ completion of chemotherapy, then transitioned to posa x3/18 given visual hallucinations x3/17     GI  # GERD, improved  # Epigastric pain, resolved  Reported recent increase in symptoms in the days leading up to admission and initiated famotidine. Persistent/progressive symptoms throughout admission. Cardiac work-up reassuring, lipase negative. Increased H2B to BID (3/9); given persistent symptoms, PPI started (3/14). Increased to BID (3/19). CT C/A/P with evidence of esophagitis (thought due to reflux/recent chemotherapy) and small hiatal hernia. Symptoms have improved on maximal medical therapy as below.  - Continue famotidine 20 mg BID  - Continue Protonix 40 mg BID  - GI cocktail PRN  - TUMS PRN  - GI consulted, EGD deferred for now as risks thought to outweigh the benefits in the setting of neutropenia; can reassess upon count recovery  - Continue to encourage lifestyle modifications: avoid straws, carbonated beverages, GERD precautions    # Risk of malnutrition  Secondary to esophagitis/GERD/mucositis picture as above, patient intake has decreased to small servings, mostly eating soup. On regular adult diet with thin liquids per SLP. Recommended trial of high caloric liquids, patient tried Strawberry Ensure and plans to continue to order with meals.  - RD following; appreciate recs     # Nausea/vomiting, improving  Patient noted ongoing nausea w/ occasional vomiting starting Dec 2024. Has been managed with PRN Zofran.  Endorsed nausea on admission, now resolved.   - PRN Zofran and compazine  - Could consider trial of PRN Zyprexa if 3rd agent is desired/required     PULM  # COPD  Longstanding history of COPD. On admission patient reports symptoms are manageable with no recent exacerbations. Most recent PFTs (2018) were normal.  - Continue PTA Breo Ellipta inhaler and PRN DuoNebs      CV  # Essential hypertension  - PTA lisinopril 10 mg daily HELD 3/11 w/ soft BPs  (90s-100s/50s-60s), resume pending daily trends    # Infrarenal abdominal aortic aneurysm  Noted incidentally on CT C/A/P (3/19/25), measuring 3.4 cm in diameter.  - Attention on follow-up imaging    RENAL/FEN  # Stage 2 CKD  Baseline Cr ~ 0.7. On admission Cr 0.77.  - Continue to trend on daily labs and encourage PO hydration     NEURO  # Chronic migraine w/o aura  # Chronic headaches  Present since childhood. Reportedly triggered by neck manipulation/movement. Reportedly well-managed with regimen below. Headaches occurring throughout admission with daily APAP use, also chronic and managed with APAP PRN at home. Patient notes that her headaches throughout admission have been consistent with her typical daily headaches with no reported changes in character, quality, location, severity, or frequency. She denies new associated neurological changes. Considered LP to exclude CNS leukemia, but given this reassuring history and absence of other indications for LP (no hyperleukocytosis, no monocytic phenotype, no FLT3 mutation, etc), this was ultimately deferred.  - APAP PRN  - Continue PTA sumatriptan and cyclobenzaprine PRN  - Could reconsider need for LP if headache worsens/changes in character or quality in the future    MISC  # Maculopapular rash  Noted on 3/25. Pruritic, localized to the neck, right shoulder, and flexural area behind the right knee. Clinically, affected areas consist of coalescing erythematous macules with scattered papules. No obvious vesicles or bullae. DDx includes contact dermatitis (favored, possibly due to CHG wipes) versus atopic dermatitis (flexural areas raise suspicion for this) versus drug eruption versus other. Improved as of 3/26 with triamcinolone ointment.  - Continue triamcinolone ointment 0.1% BID to the affected areas  - Okay to defer CHG wipes given concern for contact dermatitis  - Monitor clinically    # BROOKS  # MDD  # At risk for adjustment disorder  Patient reports good control  of anxiety/depressive symptoms prior to admission. Did note feeling overwhelmed by new diagnosis. Offered health psychology or cordelia, which she declined.  - Readdress health psych consult as indicated  - Continue PTA paroxetine   - Atarax as needed     # Tobacco use disorder  Has smoked since age 16, 1.5 ppd (roughly 65 pack years). Attempting to cut back as recently as 01/2025 to 0.5 ppd. We discussed the risks of smoking during induction chemotherapy including increased risk for infection in setting of severe neutropenia that could further complicate course, placing her at risk for severe complications that could be life-threatening or even fatal.  - Encourage smoking cessation; patient continues to smoke at this time, despite understanding/acknowledgement of the risks.  - Trialed nicotine patch, then self-discontinued after reporting that she felt the nicotine patch precipitated an anxiety attack/episode of chest pain on 3/9. Offered a lower dose versus alternative form of NRT, which patient declined.      # Degenerative disc disease, L5-S1   - PTA gabapentin 400 mg TID prn.   - Patient reported increased L sciatic pain, had taken gabapentin TID on 3/29 with improvement in symptoms. Will schedule gabapentin 400 mg TID per patient request. Improving as of 3/31.  - Continue to monitor symptoms    Chronic Problems:  # Vitamin D deficiency - Continue PTA vit D supplement  # Seasonal allergies - Claritin 10 mg daily  # Rheumatoid arthritis - Patient reported trying treatment though was unable to tolerate well. Managed with Tylenol PRN. Most recent RF >650 (2/10/25). JI negative.    # Prediabetes - Last A1c 6.0 x12/9/24. Has been managing with lifestyle modifications.   # Mixed hyperlipidemia - Lipid panel has remained at goal, 1/14/25 panel w/ cholesterol 163, , LDL 92, HDL 45.   - Held PTA atorvastatin on admission due to chemotherapy interactions. Consider rotation to rosuvastatin for better drug-drug  interaction profile.   # H/o aortic stenosis - Patient noted on admission longstanding history of aortic stenosis. Pre-chemo echo w/ EF 60-65% and mild aortic stenosis and insufficiency. No previous echo to compare.     Resolved Hospital Problems:  # Mild hyponatremia, resolved - New mild hyponatremia noted 3/13 with Na 135 ? 130. Asymptomatic. Now resolved.  # Non-radiating chest pain, resolved - for details see note from 3/21 and prior   # Urinary frequency + Dysuria, resolved - On admission, patient noted longstanding history of urinary frequency though new mild dysuria. UA (2/26) negative, UC with no growth. Symptoms have since resolved.  # R AOM with purulent effusion and Small L TM perforation, resolved - ENT consulted, appreciate recs. Completed 7-day course Levaquin 750mg daily and 5 day course of floxin drops to left ear for perforation     Clinically Significant Risk Factors               # Hypoalbuminemia: Lowest albumin = 3.2 g/dL at 3/24/2025  4:59 AM, will monitor as appropriate     # Hypertension: Noted on problem list             # Moderate Malnutrition: based on nutrition assessment and treatment provided per dietitian's recommendations.      # Financial/Environmental Concerns: none  # Asthma: noted on problem list       Fluids/Electrolytes/Nutrition   - IVF bolus PRN   - PRN lyte replacement per standing protocol  - Regular diet as tolerated      Lines: PICC     PPX  VTE: Lovenox held 3/13 for TCP, resumed 3/28   GI: Pepcid, Protonix  Bowels: prn senna and miralax  Activity: as tolerated    Code Status: DNR/DNI    Disposition: Inpatient admission to [facility] service for treatment of AML; discharge to home pending restaging BMBx.  Follow-up: Will follow with Dr. Harris as primary oncologist and go to [facility] for twice weekly labs/transfusions.      Medically Ready for Discharge: Anticipated in 5+ Days      Staffed with Dr. Jaimes.    I spent 75 minutes in the care of this  patient today, which included time necessary for review of interval events, obtaining history and physical exam, ordering medication(s)/test(s) as medically indicated, discussion with interdisciplinary/consult team(s), care coordination, and documentation time.     Andrey Crowell PA-C  Hematology/Oncology  Pager [phone]    Interval History   No acute overnight events. Nursing notes reviewed.     Alejandra is feeling well upon interview this morning. Discussed fever occurrence overnight. Patient denies any new infectious symptoms such as congestion, chills, shortness of breath, rash, headache. She has a mild nonproductive cough that she reports having for over a week. Reviewed pending infectious workup and antibiotics, to which she states understanding. Informed patient we are waiting on results from her bone marrow biopsy to determine consolidation therapy timeline. She reaffirms that she is eager to get home. Denies chest pain, peripheral edema, nausea/vomiting/diarrhea, abdominal pain. All concerns were addressed and questions were answered.    A comprehensive review of symptoms was performed and was negative except as detailed in the interval history above.    Physical Exam   Vital Signs with Ranges  Temp:  [98  F (36.7  C)-101.4  F (38.6  C)] 99  F (37.2  C)  Pulse:  [75-88] 81  Resp:  [16-24] 24  BP: (108-136)/(72-81) 108/78  SpO2:  [80 %-97 %] 88 %    I/O last 3 completed shifts:  In: 820 [P.O.:820]  Out: 2300 [Urine:2300]    Vitals:    03/30/25 0930 03/31/25 1014 04/01/25 0948   Weight: 79.3 kg (174 lb 12.8 oz) 80.7 kg (178 lb) 80.7 kg (177 lb 14.4 oz)     Constitutional: Pleasant and cooperative female, in NAD. Alert, interactive, non-toxic. Smiling and conversational.  HEENT: NC/AT, sclera clear, conjunctiva normal, MMM without lesion, thrush, or abscess; poor dentition.  Respiratory: No increased work of breathing. Breath sounds mildly diminished to the lung bases bilaterally. Slight inspiratory wheeze in b/l  lung fields.   Cardiovascular: RRR, systolic murmur. No peripheral edema.   GI: Normal bowel sounds. Abdomen with central obesity, is soft, non-distended and non-tender to palpation.  Skin: Warm, dry, well-perfused.   Musculoskeletal: Diminished muscle bulk, no gross deformities.  Neurologic: A&O. Answers questions appropriately, speech normal. Normal gait. Moves all extremities spontaneously.  Psychiatric: Normal mood and affect.  Vascular access:  PICC on RUE CDI, non-tender, no surrounding erythema.    Medications   Current Facility-Administered Medications   Medication Dose Route Frequency Provider Last Rate Last Admin     Current Facility-Administered Medications   Medication Dose Route Frequency Provider Last Rate Last Admin    acyclovir (ZOVIRAX) tablet 400 mg  400 mg Oral BID Chelsie Murphy PA-C   400 mg at 03/31/25 2042    ceFEPIme (MAXIPIME) 2 g vial to attach to  mL bag for ADULTS or NS 50 mL bag for PEDS  2 g Intravenous Q8H Omar Bonilla DO   2 g at 04/01/25 0531    enoxaparin ANTICOAGULANT (LOVENOX) injection 40 mg  40 mg Subcutaneous Q24H Nery Crowell PA-C   40 mg at 03/31/25 2041    famotidine (PEPCID) tablet 20 mg  20 mg Oral BID Zully Garcia PA-C   20 mg at 03/31/25 2042    fluticasone-vilanterol (BREO ELLIPTA) 100-25 MCG/ACT inhaler 1 puff  1 puff Inhalation Daily Chelsie Murphy PA-C   1 puff at 03/31/25 0840    gabapentin (NEURONTIN) capsule 400 mg  400 mg Oral TID Zully Garcia PA-C   400 mg at 03/31/25 2041    heparin lock flush 10 unit/mL injection 5-20 mL  5-20 mL Intracatheter Q24H Chelsie Murphy PA-C   5 mL at 04/01/25 0433    [Held by provider] lisinopril (ZESTRIL) tablet 10 mg  10 mg Oral Daily Jyoti Tenorio PA-C   10 mg at 03/10/25 1039    loratadine (CLARITIN) tablet 10 mg  10 mg Oral Daily Chelsie Murphy PA-C   10 mg at 03/31/25 0841    pantoprazole (PROTONIX) EC tablet 40 mg  40 mg Oral BID AC  Nery Crowell PA-C   40 mg at 03/31/25 1614    PARoxetine (PAXIL) tablet 20 mg  20 mg Oral Chelsie Lowery PA-C   20 mg at 03/31/25 0841    posaconazole (NOXAFIL)  tablet TBEC 300 mg  300 mg Oral QANery Bliss PA-C   300 mg at 03/31/25 0840    sodium chloride (PF) 0.9% PF flush 10-40 mL  10-40 mL Intracatheter Q8H Chelsie Murphy PA-C   20 mL at 04/01/25 0433    sucralfate (CARAFATE) suspension 1 g  1 g Oral 4x Daily AC & HS Aman Cervantes MD   1 g at 03/31/25 2112    triamcinolone (KENALOG) 0.1 % ointment   Topical BID Chelsie Davila PA-C   Given at 03/28/25 1035    Vitamin D3 (CHOLECALCIFEROL) tablet 1,000 Units  1,000 Units Oral Daily Chelsie Murphy PA-C   1,000 Units at 03/31/25 0841     Antiinfectives  Anti-infectives (From now, onward)      Start     Dose/Rate Route Frequency Ordered Stop    04/01/25 0530  ceFEPIme (MAXIPIME) 2 g vial to attach to  mL bag for ADULTS or NS 50 mL bag for PEDS         2 g  over 30 Minutes Intravenous EVERY 8 HOURS 04/01/25 0456      03/18/25 0800  posaconazole (NOXAFIL) DR tablet TBEC 300 mg         300 mg Oral EVERY MORNING 03/17/25 1242      02/26/25 2000  acyclovir (ZOVIRAX) tablet 400 mg         400 mg Oral 2 TIMES DAILY 02/26/25 1307            Data   CBC   Recent Labs   Lab 04/01/25  0444 03/31/25  0353 03/30/25  0932 03/29/25  0747   WBC 7.5 5.9 4.6 3.0*   RBC 2.41* 2.54* 2.61* 2.58*   HGB 7.4* 7.9* 8.0* 8.1*   HCT 22.3* 23.8* 24.0* 24.2*   MCV 93 94 92 94   MCH 30.7 31.1 30.7 31.4   MCHC 33.2 33.2 33.3 33.5   RDW 16.1* 15.6* 15.4* 14.8   PLT 1,538*  581* 1,451* 1,316*  --      CMP   Recent Labs   Lab 04/01/25  0444 03/31/25  0353 03/30/25  0427 03/29/25  0431    139 143 141   POTASSIUM 3.6 3.6 3.7 3.7   CHLORIDE 99 101 106 106   CO2 26 25 27 25   ANIONGAP 13 13 10 10   * 116* 110* 123*   BUN 7.9 10.6 8.8 10.4   CR 0.68 0.61 0.61 0.59   GFRESTIMATED >90 >90 >90 >90    TOBIN 8.8 8.8 8.7* 9.1   MAG 2.1 2.1 2.2 2.3   PHOS 3.4 4.0 4.2 3.9   PROTTOTAL 6.6 6.7 6.5 6.6   ALBUMIN 3.5 3.5 3.3* 3.4*   BILITOTAL 0.2 0.2 0.2 0.2   ALKPHOS 158* 136 125 125   AST 25 17 15 12   ALT 18 13 11 9     LFTs   Recent Labs   Lab 04/01/25  0444   PROTTOTAL 6.6   ALBUMIN 3.5   BILITOTAL 0.2   ALKPHOS 158*   AST 25   ALT 18     Coagulation Studies   Recent Labs   Lab 03/31/25  0353   INR 1.14   PTT 32

## 2025-04-01 NOTE — PLAN OF CARE
Goal Outcome Evaluation:      Plan of Care Reviewed With: patient, sibling    Overall Patient Progress: improvingOverall Patient Progress: improving    Outcome Evaluation: Day +28 s/p Induction chemo    RESP: Sats 80% on RA when sleeping. Up to 88% after sitting up and coughing. Denies SOB. A bit later 92% on RA. PA aware.     Afebrile. UA/UC sent. Additional labs sent for further workup. Diaphoretic this afternoon while napping.     Sister and niece here today. Up walking in the halls and outside to smoke. She was sad when they left but is looking forward to discharging this weekend (probably Saturday per MD).     Plan: Waiting on BMBX results: Plan to start consolidation chemotherapy tomorrow

## 2025-04-02 ENCOUNTER — TELEPHONE (OUTPATIENT)
Dept: TRANSPLANT | Facility: CLINIC | Age: 60
End: 2025-04-02
Payer: MEDICARE

## 2025-04-02 DIAGNOSIS — C92.00 ACUTE MYELOID LEUKEMIA NOT HAVING ACHIEVED REMISSION (H): Primary | ICD-10-CM

## 2025-04-02 LAB
1,3 BETA GLUCAN SER-MCNC: <31 PG/ML
ALBUMIN SERPL BCG-MCNC: 3.3 G/DL (ref 3.5–5.2)
ALP SERPL-CCNC: 180 U/L (ref 40–150)
ALT SERPL W P-5'-P-CCNC: 37 U/L (ref 0–50)
ANION GAP SERPL CALCULATED.3IONS-SCNC: 12 MMOL/L (ref 7–15)
APTT PPP: 39 SECONDS (ref 22–38)
AST SERPL W P-5'-P-CCNC: 36 U/L (ref 0–45)
BACTERIA UR CULT: NO GROWTH
BASOPHILS # BLD MANUAL: 0.2 10E3/UL (ref 0–0.2)
BASOPHILS NFR BLD MANUAL: 2 %
BILIRUB SERPL-MCNC: 0.2 MG/DL
BUN SERPL-MCNC: 9.5 MG/DL (ref 6–20)
CALCIUM SERPL-MCNC: 8.9 MG/DL (ref 8.8–10.4)
CHLORIDE SERPL-SCNC: 102 MMOL/L (ref 98–107)
CREAT SERPL-MCNC: 0.68 MG/DL (ref 0.51–0.95)
EGFRCR SERPLBLD CKD-EPI 2021: >90 ML/MIN/1.73M2
EOSINOPHIL # BLD MANUAL: 0 10E3/UL (ref 0–0.7)
EOSINOPHIL NFR BLD MANUAL: 0 %
ERYTHROCYTE [DISTWIDTH] IN BLOOD BY AUTOMATED COUNT: 16.5 % (ref 10–15)
FIBRINOGEN PPP-MCNC: 712 MG/DL (ref 170–510)
GIANT PLATELETS BLD QL SMEAR: SLIGHT
GLUCOSE SERPL-MCNC: 137 MG/DL (ref 70–99)
HCO3 SERPL-SCNC: 27 MMOL/L (ref 22–29)
HCT VFR BLD AUTO: 22.3 % (ref 35–47)
HGB BLD-MCNC: 7.2 G/DL (ref 11.7–15.7)
INR PPP: 1.12 (ref 0.85–1.15)
LDH SERPL L TO P-CCNC: 341 U/L (ref 0–250)
LYMPHOCYTES # BLD MANUAL: 1.6 10E3/UL (ref 0.8–5.3)
LYMPHOCYTES NFR BLD MANUAL: 18 %
MAGNESIUM SERPL-MCNC: 2.2 MG/DL (ref 1.7–2.3)
MCH RBC QN AUTO: 31.4 PG (ref 26.5–33)
MCHC RBC AUTO-ENTMCNC: 32.3 G/DL (ref 31.5–36.5)
MCV RBC AUTO: 97 FL (ref 78–100)
METAMYELOCYTES # BLD MANUAL: 0.1 10E3/UL
METAMYELOCYTES NFR BLD MANUAL: 1 %
MONOCYTES # BLD MANUAL: 1.9 10E3/UL (ref 0–1.3)
MONOCYTES NFR BLD MANUAL: 22 %
MYELOCYTES # BLD MANUAL: 0.3 10E3/UL
MYELOCYTES NFR BLD MANUAL: 3 %
NEUTROPHILS # BLD MANUAL: 4.7 10E3/UL (ref 1.6–8.3)
NEUTROPHILS NFR BLD MANUAL: 54 %
NRBC # BLD AUTO: 0.2 10E3/UL
NRBC BLD MANUAL-RTO: 2 %
OBSERVATION IMP: NEGATIVE
PHOSPHATE SERPL-MCNC: 3.7 MG/DL (ref 2.5–4.5)
PLAT MORPH BLD: ABNORMAL
PLATELET # BLD AUTO: 1501 10E3/UL (ref 150–450)
POLYCHROMASIA BLD QL SMEAR: SLIGHT
POTASSIUM SERPL-SCNC: 3.5 MMOL/L (ref 3.4–5.3)
PROT SERPL-MCNC: 6.5 G/DL (ref 6.4–8.3)
RBC # BLD AUTO: 2.29 10E6/UL (ref 3.8–5.2)
RBC MORPH BLD: ABNORMAL
SODIUM SERPL-SCNC: 141 MMOL/L (ref 135–145)
URATE SERPL-MCNC: 3.5 MG/DL (ref 2.4–5.7)
WBC # BLD AUTO: 8.7 10E3/UL (ref 4–11)

## 2025-04-02 PROCEDURE — 250N000013 HC RX MED GY IP 250 OP 250 PS 637

## 2025-04-02 PROCEDURE — 85014 HEMATOCRIT: CPT

## 2025-04-02 PROCEDURE — 85041 AUTOMATED RBC COUNT: CPT

## 2025-04-02 PROCEDURE — 84100 ASSAY OF PHOSPHORUS: CPT | Performed by: PHYSICIAN ASSISTANT

## 2025-04-02 PROCEDURE — 250N000011 HC RX IP 250 OP 636

## 2025-04-02 PROCEDURE — 84550 ASSAY OF BLOOD/URIC ACID: CPT | Performed by: PHYSICIAN ASSISTANT

## 2025-04-02 PROCEDURE — 85384 FIBRINOGEN ACTIVITY: CPT | Performed by: PHYSICIAN ASSISTANT

## 2025-04-02 PROCEDURE — 99418 PROLNG IP/OBS E/M EA 15 MIN: CPT | Performed by: INTERNAL MEDICINE

## 2025-04-02 PROCEDURE — 85007 BL SMEAR W/DIFF WBC COUNT: CPT

## 2025-04-02 PROCEDURE — 85610 PROTHROMBIN TIME: CPT | Performed by: PHYSICIAN ASSISTANT

## 2025-04-02 PROCEDURE — 80053 COMPREHEN METABOLIC PANEL: CPT

## 2025-04-02 PROCEDURE — 250N000013 HC RX MED GY IP 250 OP 250 PS 637: Performed by: HOSPITALIST

## 2025-04-02 PROCEDURE — 250N000011 HC RX IP 250 OP 636: Mod: JZ | Performed by: STUDENT IN AN ORGANIZED HEALTH CARE EDUCATION/TRAINING PROGRAM

## 2025-04-02 PROCEDURE — 83735 ASSAY OF MAGNESIUM: CPT

## 2025-04-02 PROCEDURE — 83615 LACTATE (LD) (LDH) ENZYME: CPT

## 2025-04-02 PROCEDURE — 120N000002 HC R&B MED SURG/OB UMMC

## 2025-04-02 PROCEDURE — 85730 THROMBOPLASTIN TIME PARTIAL: CPT | Performed by: PHYSICIAN ASSISTANT

## 2025-04-02 PROCEDURE — 99233 SBSQ HOSP IP/OBS HIGH 50: CPT | Mod: FS | Performed by: INTERNAL MEDICINE

## 2025-04-02 RX ADMIN — CYCLOBENZAPRINE HYDROCHLORIDE 10 MG: 5 TABLET, FILM COATED ORAL at 20:35

## 2025-04-02 RX ADMIN — ACETAMINOPHEN 650 MG: 325 TABLET, FILM COATED ORAL at 22:13

## 2025-04-02 RX ADMIN — ACYCLOVIR 400 MG: 400 TABLET ORAL at 20:35

## 2025-04-02 RX ADMIN — ACYCLOVIR 400 MG: 400 TABLET ORAL at 10:19

## 2025-04-02 RX ADMIN — SUCRALFATE ORAL 1 G: 1 SUSPENSION ORAL at 10:25

## 2025-04-02 RX ADMIN — PANTOPRAZOLE SODIUM 40 MG: 40 TABLET, DELAYED RELEASE ORAL at 10:20

## 2025-04-02 RX ADMIN — CEFEPIME 2 G: 2 INJECTION, POWDER, FOR SOLUTION INTRAVENOUS at 22:13

## 2025-04-02 RX ADMIN — GABAPENTIN 400 MG: 300 CAPSULE ORAL at 20:35

## 2025-04-02 RX ADMIN — ACETAMINOPHEN 650 MG: 325 TABLET, FILM COATED ORAL at 16:55

## 2025-04-02 RX ADMIN — ACETAMINOPHEN 650 MG: 325 TABLET, FILM COATED ORAL at 01:50

## 2025-04-02 RX ADMIN — Medication 1000 UNITS: at 10:19

## 2025-04-02 RX ADMIN — PAROXETINE HYDROCHLORIDE 20 MG: 20 TABLET, FILM COATED ORAL at 10:20

## 2025-04-02 RX ADMIN — SUCRALFATE ORAL 1 G: 1 SUSPENSION ORAL at 16:51

## 2025-04-02 RX ADMIN — CEFEPIME 2 G: 2 INJECTION, POWDER, FOR SOLUTION INTRAVENOUS at 04:58

## 2025-04-02 RX ADMIN — ENOXAPARIN SODIUM 40 MG: 40 INJECTION SUBCUTANEOUS at 20:36

## 2025-04-02 RX ADMIN — HYDROXYZINE HYDROCHLORIDE 50 MG: 25 TABLET, FILM COATED ORAL at 01:52

## 2025-04-02 RX ADMIN — HYDROXYZINE HYDROCHLORIDE 50 MG: 25 TABLET, FILM COATED ORAL at 10:29

## 2025-04-02 RX ADMIN — GABAPENTIN 400 MG: 300 CAPSULE ORAL at 13:26

## 2025-04-02 RX ADMIN — CEFEPIME 2 G: 2 INJECTION, POWDER, FOR SOLUTION INTRAVENOUS at 13:26

## 2025-04-02 RX ADMIN — PANTOPRAZOLE SODIUM 40 MG: 40 TABLET, DELAYED RELEASE ORAL at 16:51

## 2025-04-02 RX ADMIN — LORATADINE 10 MG: 10 TABLET ORAL at 10:20

## 2025-04-02 RX ADMIN — ACETAMINOPHEN 650 MG: 325 TABLET, FILM COATED ORAL at 10:29

## 2025-04-02 RX ADMIN — FAMOTIDINE 20 MG: 20 TABLET, FILM COATED ORAL at 10:20

## 2025-04-02 RX ADMIN — SUCRALFATE ORAL 1 G: 1 SUSPENSION ORAL at 22:13

## 2025-04-02 RX ADMIN — GABAPENTIN 400 MG: 300 CAPSULE ORAL at 10:20

## 2025-04-02 RX ADMIN — HEPARIN, PORCINE (PF) 10 UNIT/ML INTRAVENOUS SYRINGE 5 ML: at 23:32

## 2025-04-02 RX ADMIN — FAMOTIDINE 20 MG: 20 TABLET, FILM COATED ORAL at 20:36

## 2025-04-02 RX ADMIN — Medication 5 ML: at 05:46

## 2025-04-02 RX ADMIN — FLUTICASONE FUROATE AND VILANTEROL TRIFENATATE 1 PUFF: 100; 25 POWDER RESPIRATORY (INHALATION) at 10:19

## 2025-04-02 NOTE — PLAN OF CARE
"Goal Outcome Evaluation:      Plan of Care Reviewed With: patient    Overall Patient Progress: no changeOverall Patient Progress: no change    Outcome Evaluation: 0700 - 1500    0700 - 1500    /78 (BP Location: Left arm)   Pulse 77   Temp 99.6  F (37.6  C) (Oral)   Resp 22   Ht 1.626 m (5' 4\")   Wt 81.6 kg (180 lb)   LMP 01/01/2007 (Approximate)   SpO2 92%   BMI 30.90 kg/m      Reason for admission: AML, Induction with 7+3 (D1=3/5/25)  Activity: Independent   Pain: Rated hip pain a 3/10, given PRN Tylenol x1 and PRN Atarax x1.   Neuro: A&Ox4, WDL  Cardiac: WDL, VSS  Respiratory: WDL on RA  GI/: Voiding spontaneously, last BM 4/2, denies nausea  Diet: Regular   Lines: PICC  Wounds: BMBx site FLORES    Plan: Possible consolidation chemo tomorrow (4/3)      Continue to monitor and follow POC    "

## 2025-04-02 NOTE — PROGRESS NOTES
[facility] [place]    Progress Note  Hematology / Oncology     Date of Admission:  2/26/2025  Hospital Day #: 35   Date of Service (when I saw the patient): 04/02/2025    Assessment & Plan   Alejandra Villegas is a 59 year old female with a past medical history significant for COPD, migraines, rheumatoid arthritis, aortic stenosis, Afib, and anxiety who presented to an outside hospital with cytopenias and was found on BMBx to have hypercellular marrow with 4% blasts, 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 She was subsequently admitted to [facility] on 2/26/25 for further work-up and management and pathology re-review was most consistent with AML with NPM1 mutation by WHO 2022 (which does not require a specific blast threshold). Following further multidisciplinary discussion, she started intensive induction with 7+3 (D1=3/5/25). Her course has been complicated by neutropenic fevers, epigastric pain, influenza A, AOM, and L TM perforation. Midpoint BMBx morphology showing no morphologic or immunophenotypic evidence of acute myeloid leukemia.     TODAY:  - D29 7+3 induction.   - Restaging BMBx completed 3/31. Flow with 4.3% blasts of unusual phenotypes, and morph without dysplasia or blasts. Cytogenetics, molecular pending.   - Plan to start consolidation therapy with HiDAC after afebrile >48 hours, likely tomorrow 4/3.    - Afebrile over past 24 hours. Infectious workup negative thus far, BCx remain NGTD. Cryptococcus negative, histoplasma and fungitell pending. Continue Cefepime (4/1-X).   - Continue best supportive cares.    HEME  # AML with NPM1 mutation  Had been seen by PCP Dec 2024 w/ progressive fatigue and nausea where she was found leukopenic WBC  2.4 and neutropenic w/ ANC 0.3. Hgb 12. Was referred to local hematology/oncology clinic for further evaluation and seen by Dr. Samina Harris. BMBx 2/10/25 done at OSH showed hypercellular marrow w/ trilineage  hematopoiesis w/ dyspoiesis with mildly elevated blasts of 4% on morphology. NGS w/ NPM1, IDH2, and NRAS mutations. She was admitted to [facility] 2/26/25 for further workup and management. Slides were re-reviewed by [facility] Hematopathology, who noted hypercellular marrow with 8% blasts by morphology. Despite relatively low blast percentage, findings were felt most consistent with a diagnosis AML with NPM1 mutation by WHO 2022 (which does not require a specific blast threshold). Following interdepartmental discussions and review of the available literature, patient was started on intensive induction with 7+3 (Day 1=3/5/25).   - PICC placed 3/5/2025, plan to discontinue on discharge.   - Baseline cardiopulmonary studies:  - Echo w/ EF 60-65%, mild known aortic stenosis.   - EKG (2/27) with NSR, QTc 412  - CXR (2/26) with mildly increased streaky bibasilar opacities, atelectasis or edema. No consolidation.   - Baseline viral serologies: CMV IgG+, EBV IgG+, HepB sAg-, HepB cAb-, HepB sAb-, HSV1+/2+, HIV-  - HLA Typing sent on admission. Message sent to notify BMT team x2/27 for IP consult.   - Midcycle BMBx 3/19/25: Flow with no increase in myeloid blasts and no abnormal myeloid blast population. Morphology with no morphologic or immunophenotypic evidence of acute myeloid leukemia.   - D28 BMBx completed 3/31/25. Flow with 4.3% blasts of unusual phenotypes, and morph without dysplasia or blasts. Cytogenetics, molecular pending.   - Plan to start consolidation therapy with HiDAC after afebrile >48 hours, planned for 4/3.      Treatment plan: HiDAC (C1D1=4/3/2025)   - Cytarabine 3 g/m  IV q12h D1-3   - Neulasta 6 mg D5 - requested for 4/7 at [facility]  Supportive medications: Zofran 8 mg q12h, Dexamethasone 4 mg q12h D1-3, Prednisolone eye  drops QID D1-5     # Pancytopenia  Secondary to underlying hematologic malignancy and exacerbated by recent chemotherapy.  - Follow daily CBC with differential  - Transfuse to keep  Hgb >7, plt >10K  - Blood consent obtained 2/26/25 on admission and placed in patient's paper chart.      ID  # Neutropenic fever, resolved  # Influenza A, resolved  Episode #1:  2/25: On admission, patient noted subjective fever with chills and rhinitis beginning 2/25 PM prior to admission. No other localizing s/sx of infection. She was afebrile on admission but spiked a low-grade fever to 100.7 on 2/26 PM. Blood and urine cultures negative, CXR with streaky opacities but no burt consolidation. Work-up positive for influenza A, and she completed a course of Tamiflu 75 mg BID x5 days (2/26-3/3). She also received a short empiric course of IV cefepime (2/26-3/1) given concurrent neutropenia.  Episode #2:  3/13: Febrile to 100.4 overnight x3/13. No localizing/new symptoms. OVSS. Patient was wearing heated jacket that she attributed to temperature, no documentation noted of notifying cross cover MD. No further ID workup (BCx, UA, UC, or CXR) completed and no antibiotic changes made. Given borderline temperature, decision made to continue monitoring and no further work-up was undertaken.  Episode #3:  3/19: Tmax 100.6 overnight. Asymptomatic, although notes sweating upon waking the following morning. CXR with scattered patchy densities, especially in the lung bases. Respiratory panel negative. UA with protein and blood but otherwise bland, UCx negative. BCx NGTD. A CT C/A/P was done (for other reasons, as below) and notable for findings suggestive of esophagitis as well as possible bronchitis/small airways disease. She was treated with a 7-day course of IV cefepime (3/18-3/25) and fever did not recur.  Episode #4:  4/1: Tmax 101.4 overnight. Nonproductive cough, otherwise no new focal infectious symptoms. CXR with diffuse hazy opacities, UA/UCx to be collected, BCx pending. Started on Cefepime (4/1-x).  - monitor for recurrent fevers    # ID prophylaxis  - Acyclovir 400 mg BID  - Levaquin 500 mg daily - when ANC  <1.0  - Posaconazole 300 mg daily - HELD for possible consolidation - resume on discharge given active smoker   - Vori w/ $1.60 copay, posa requiring PA also w/ $1.60 copay. Rotated from deny to vori (3/13-3/17) w/ completion of chemotherapy, then transitioned to posa x3/18 given visual hallucinations x3/17     GI  # GERD, improved  # Epigastric pain, resolved  Reported recent increase in symptoms in the days leading up to admission and initiated famotidine. Persistent/progressive symptoms throughout admission. Cardiac work-up reassuring, lipase negative. Increased H2B to BID (3/9); given persistent symptoms, PPI started (3/14). Increased to BID (3/19). CT C/A/P with evidence of esophagitis (thought due to reflux/recent chemotherapy) and small hiatal hernia. Symptoms have improved on maximal medical therapy as below.  - Continue famotidine 20 mg BID  - Continue Protonix 40 mg BID  - GI cocktail PRN  - TUMS PRN  - GI consulted, EGD deferred for now as risks thought to outweigh the benefits in the setting of neutropenia; can reassess upon count recovery  - Continue to encourage lifestyle modifications: avoid straws, carbonated beverages, GERD precautions    # Risk of malnutrition  Secondary to esophagitis/GERD/mucositis picture as above, patient intake has decreased to small servings, mostly eating soup. On regular adult diet with thin liquids per SLP. Recommended trial of high caloric liquids, patient tried Strawberry Ensure and plans to continue to order with meals.  - RD following; appreciate recs     # Nausea/vomiting, improving  Patient noted ongoing nausea w/ occasional vomiting starting Dec 2024. Has been managed with PRN Zofran.  Endorsed nausea on admission, now resolved.   - PRN Zofran and compazine  - Could consider trial of PRN Zyprexa if 3rd agent is desired/required     PULM  # COPD  Longstanding history of COPD. On admission patient reports symptoms are manageable with no recent exacerbations. Most  recent PFTs (2018) were normal.  - Continue PTA Breo Ellipta inhaler and PRN DuoNebs      CV  # Essential hypertension  - PTA lisinopril 10 mg daily HELD 3/11 w/ soft BPs (90s-100s/50s-60s), resume pending daily trends    # Infrarenal abdominal aortic aneurysm  Noted incidentally on CT C/A/P (3/19/25), measuring 3.4 cm in diameter.  - Attention on follow-up imaging    RENAL/FEN  # Stage 2 CKD  Baseline Cr ~ 0.7. On admission Cr 0.77.  - Continue to trend on daily labs and encourage PO hydration     NEURO  # Chronic migraine w/o aura  # Chronic headaches  Present since childhood. Reportedly triggered by neck manipulation/movement. Reportedly well-managed with regimen below. Headaches occurring throughout admission with daily APAP use, also chronic and managed with APAP PRN at home. Patient notes that her headaches throughout admission have been consistent with her typical daily headaches with no reported changes in character, quality, location, severity, or frequency. She denies new associated neurological changes. Considered LP to exclude CNS leukemia, but given this reassuring history and absence of other indications for LP (no hyperleukocytosis, no monocytic phenotype, no FLT3 mutation, etc), this was ultimately deferred.  - APAP PRN  - Continue PTA sumatriptan and cyclobenzaprine PRN  - Could reconsider need for LP if headache worsens/changes in character or quality in the future    MISC  # Maculopapular rash  Noted on 3/25. Pruritic, localized to the neck, right shoulder, and flexural area behind the right knee. Clinically, affected areas consist of coalescing erythematous macules with scattered papules. No obvious vesicles or bullae. DDx includes contact dermatitis (favored, possibly due to CHG wipes) versus atopic dermatitis (flexural areas raise suspicion for this) versus drug eruption versus other. Improved as of 3/26 with triamcinolone ointment.  - Continue triamcinolone ointment 0.1% BID to the affected  areas  - Okay to defer CHG wipes given concern for contact dermatitis  - Monitor clinically    # BROOKS  # MDD  # At risk for adjustment disorder  Patient reports good control of anxiety/depressive symptoms prior to admission. Did note feeling overwhelmed by new diagnosis. Offered health psychology or cordelia, which she declined.  - Readdress health psych consult as indicated  - Continue PTA paroxetine   - Atarax as needed     # Tobacco use disorder  Has smoked since age 16, 1.5 ppd (roughly 65 pack years). Attempting to cut back as recently as 01/2025 to 0.5 ppd. We discussed the risks of smoking during induction chemotherapy including increased risk for infection in setting of severe neutropenia that could further complicate course, placing her at risk for severe complications that could be life-threatening or even fatal.  - Encourage smoking cessation; patient continues to smoke at this time, despite understanding/acknowledgement of the risks.  - Trialed nicotine patch, then self-discontinued after reporting that she felt the nicotine patch precipitated an anxiety attack/episode of chest pain on 3/9. Offered a lower dose versus alternative form of NRT, which patient declined.      # Degenerative disc disease, L5-S1   PTA gabapentin 400 mg TID prn. Patient reported increased L sciatic pain, had taken gabapentin TID on 3/29 with improvement in symptoms. Will schedule gabapentin 400 mg TID per patient request. Improving as of 3/31.  - Continue to monitor symptoms    Chronic Problems:  # Vitamin D deficiency - Continue PTA vit D supplement  # Seasonal allergies - Claritin 10 mg daily  # Rheumatoid arthritis - Patient reported trying treatment though was unable to tolerate well. Managed with Tylenol PRN. Most recent RF >650 (2/10/25). JI negative.    # Prediabetes - Last A1c 6.0 x12/9/24. Has been managing with lifestyle modifications.   # Mixed hyperlipidemia - Lipid panel has remained at goal, 1/14/25 panel w/  cholesterol 163, , LDL 92, HDL 45.   - Held PTA atorvastatin on admission due to chemotherapy interactions. Consider rotation to rosuvastatin for better drug-drug interaction profile.   # H/o aortic stenosis - Patient noted on admission longstanding history of aortic stenosis. Pre-chemo echo w/ EF 60-65% and mild aortic stenosis and insufficiency. No previous echo to compare.     Resolved Hospital Problems:  # Mild hyponatremia, resolved - New mild hyponatremia noted 3/13 with Na 135 ? 130. Asymptomatic. Now resolved.  # Non-radiating chest pain, resolved - for details see note from 3/21 and prior   # Urinary frequency + Dysuria, resolved - On admission, patient noted longstanding history of urinary frequency though new mild dysuria. UA (2/26) negative, UC with no growth. Symptoms have since resolved.  # R AOM with purulent effusion and Small L TM perforation, resolved - ENT consulted, appreciate recs. Completed 7-day course Levaquin 750mg daily and 5 day course of floxin drops to left ear for perforation     Clinically Significant Risk Factors               # Hypoalbuminemia: Lowest albumin = 3.2 g/dL at 3/24/2025  4:59 AM, will monitor as appropriate     # Hypertension: Noted on problem list             # Moderate Malnutrition: based on nutrition assessment and treatment provided per dietitian's recommendations.      # Financial/Environmental Concerns: none  # Asthma: noted on problem list       Fluids/Electrolytes/Nutrition   - IVF bolus PRN   - PRN lyte replacement per standing protocol  - Regular diet as tolerated      Lines: PICC     PPX  VTE: Lovenox held 3/13 for TCP, resumed 3/28   GI: Pepcid, Protonix  Bowels: prn senna and miralax  Activity: as tolerated    Code Status: DNR/DNI    Disposition: Inpatient admission to [facility]e Malignancy service for treatment of AML; discharge to home pending tolerance of consolidation chemotherapy.  Follow-up: Will follow with Dr. Harris as primary oncologist and go to  [facility] for twice weekly labs/transfusions (M/Th).    Medically Ready for Discharge: Anticipated in 2-4 Days      Staffed with Dr. Byrd.    I spent 60 minutes in the care of this patient today, which included time necessary for review of interval events, obtaining history and physical exam, ordering medication(s)/test(s) as medically indicated, discussion with interdisciplinary/consult team(s), care coordination, and documentation time.     Andrey Crowell PA-C  Hematology/Oncology  Pager [phone]    Interval History   No acute overnight events. Nursing notes reviewed.     Alejandra is feeling well this morning, and continues to endorse desire for discharge. Reviewed bone marrow biopsy results which were very encouraging for her. Discussed staff conversations regarding initiation of chemotherapy tomorrow given she has only been afebrile for ~24 hours. She is frustrated with this fever given she associates it with the use of a heating pad. Tentative plan to start tomorrow, leaving discharge to [facility] morning. She is not pleased with this delay but agreeable given she will go home this weekend. Reviewed follow up planning at [facility]. Last bowel movement yesterday, appetite at baseline. Denies chest pain, headache, vision changes, abdominal pain, nausea/vomiting/diarrhea, peripheral edema, rash. All concerns were addressed and questions were answered.    A comprehensive review of symptoms was performed and was negative except as detailed in the interval history above.    Physical Exam   Vital Signs with Ranges  Temp:  [98.6  F (37  C)-100  F (37.8  C)] 99.4  F (37.4  C)  Pulse:  [80-84] 80  Resp:  [20-24] 24  BP: ()/(64-78) 123/70  SpO2:  [80 %-94 %] 94 %    I/O last 3 completed shifts:  In: 890 [P.O.:890]  Out: 3200 [Urine:3200]    Vitals:    03/30/25 0930 03/31/25 1014 04/01/25 0948   Weight: 79.3 kg (174 lb 12.8 oz) 80.7 kg (178 lb) 80.7 kg (177 lb 14.4 oz)     Constitutional: Pleasant  and cooperative female, in NAD. Alert, interactive, non-toxic. Smiling and conversational.  HEENT: NC/AT, sclera clear, conjunctiva normal, MMM without lesion, thrush, or abscess; poor dentition.  Respiratory: No increased work of breathing. Breath sounds mildly diminished to the lung bases bilaterally. Slight inspiratory wheeze in b/l lung fields.   Cardiovascular: RRR, systolic murmur. No peripheral edema.   GI: Normal bowel sounds. Abdomen with central obesity, is soft, non-distended and non-tender to palpation.  Skin: Warm, dry, well-perfused.   Musculoskeletal: Diminished muscle bulk, no gross deformities.  Neurologic: A&O. Answers questions appropriately, speech normal. Normal gait. Moves all extremities spontaneously.  Psychiatric: Normal mood and affect.  Vascular access:  PICC on RUE CDI, non-tender, no surrounding erythema.    Medications   Current Facility-Administered Medications   Medication Dose Route Frequency Provider Last Rate Last Admin     Current Facility-Administered Medications   Medication Dose Route Frequency Provider Last Rate Last Admin    acyclovir (ZOVIRAX) tablet 400 mg  400 mg Oral BID Chelsie Murphy PA-C   400 mg at 04/01/25 1954    ceFEPIme (MAXIPIME) 2 g vial to attach to  mL bag for ADULTS or NS 50 mL bag for PEDS  2 g Intravenous Q8H Omar Bonilla DO   2 g at 04/02/25 0458    enoxaparin ANTICOAGULANT (LOVENOX) injection 40 mg  40 mg Subcutaneous Q24H Nery Crowell PA-C   40 mg at 04/01/25 1955    famotidine (PEPCID) tablet 20 mg  20 mg Oral BID Zully Garcia PA-C   20 mg at 04/01/25 1955    fluticasone-vilanterol (BREO ELLIPTA) 100-25 MCG/ACT inhaler 1 puff  1 puff Inhalation Daily Chelsie Murphy PA-C   1 puff at 04/01/25 1026    gabapentin (NEURONTIN) capsule 400 mg  400 mg Oral TID Zully Garcia PA-C   400 mg at 04/01/25 1954    heparin lock flush 10 unit/mL injection 5-20 mL  5-20 mL Intracatheter Q24H Jeffrey  DESEAN Holguin   5 mL at 04/02/25 0546    [Held by provider] lisinopril (ZESTRIL) tablet 10 mg  10 mg Oral Daily Jyoti Tenorio PA-C   10 mg at 03/10/25 1039    loratadine (CLARITIN) tablet 10 mg  10 mg Oral Daily Chelsie Murphy PA-C   10 mg at 04/01/25 1026    pantoprazole (PROTONIX) EC tablet 40 mg  40 mg Oral BID AC Nery Crowell PA-C   40 mg at 04/01/25 1723    PARoxetine (PAXIL) tablet 20 mg  20 mg Oral QAM Chelsie Murphy PA-C   20 mg at 04/01/25 1026    [Held by provider] posaconazole (NOXAFIL) DR tablet TBEC 300 mg  300 mg Oral QA Nery Crowell PA-C   300 mg at 04/01/25 1026    sodium chloride (PF) 0.9% PF flush 10-40 mL  10-40 mL Intracatheter Q8H Chelsie Muprhy PA-C   20 mL at 04/02/25 0458    sucralfate (CARAFATE) suspension 1 g  1 g Oral 4x Daily AC & HS Aman Cervantes MD   1 g at 04/01/25 2114    triamcinolone (KENALOG) 0.1 % ointment   Topical BID Chelsie Davila PA-C   Given at 03/28/25 1035    Vitamin D3 (CHOLECALCIFEROL) tablet 1,000 Units  1,000 Units Oral Daily Chelsie Murphy PA-C   1,000 Units at 04/01/25 1026     Antiinfectives  Anti-infectives (From now, onward)      Start     Dose/Rate Route Frequency Ordered Stop    04/01/25 0530  ceFEPIme (MAXIPIME) 2 g vial to attach to  mL bag for ADULTS or NS 50 mL bag for PEDS         2 g  over 30 Minutes Intravenous EVERY 8 HOURS 04/01/25 0456      03/18/25 0800  [Held by provider]  posaconazole (NOXAFIL) DR tablet TBEC 300 mg        (On hold since yesterday at 1349 until manually unheld; held by Nery Crowell PA-CHold Reason: Other)    300 mg Oral EVERY MORNING 03/17/25 1242      02/26/25 2000  acyclovir (ZOVIRAX) tablet 400 mg         400 mg Oral 2 TIMES DAILY 02/26/25 1307            Data   CBC   Recent Labs   Lab 04/02/25  0502 04/01/25  0444 03/31/25  0353 03/30/25  0932   WBC 8.7 7.5 5.9 4.6   RBC 2.29* 2.41* 2.54* 2.61*   HGB 7.2* 7.4*  7.9* 8.0*   HCT 22.3* 22.3* 23.8* 24.0*   MCV 97 93 94 92   MCH 31.4 30.7 31.1 30.7   MCHC 32.3 33.2 33.2 33.3   RDW 16.5* 16.1* 15.6* 15.4*   PLT 1,501* 1,538*  581* 1,451* 1,316*     CMP   Recent Labs   Lab 04/02/25  0502 04/01/25  0444 03/31/25  0353 03/30/25  0427    138 139 143   POTASSIUM 3.5 3.6 3.6 3.7   CHLORIDE 102 99 101 106   CO2 27 26 25 27   ANIONGAP 12 13 13 10   * 114* 116* 110*   BUN 9.5 7.9 10.6 8.8   CR 0.68 0.68 0.61 0.61   GFRESTIMATED >90 >90 >90 >90   TOBIN 8.9 8.8 8.8 8.7*   MAG 2.2 2.1 2.1 2.2   PHOS 3.7 3.4 4.0 4.2   PROTTOTAL 6.5 6.6 6.7 6.5   ALBUMIN 3.3* 3.5 3.5 3.3*   BILITOTAL 0.2 0.2 0.2 0.2   ALKPHOS 180* 158* 136 125   AST 36 25 17 15   ALT 37 18 13 11     LFTs   Recent Labs   Lab 04/02/25  0502   PROTTOTAL 6.5   ALBUMIN 3.3*   BILITOTAL 0.2   ALKPHOS 180*   AST 36   ALT 37     Coagulation Studies   Recent Labs   Lab 04/02/25  0502   INR 1.12   PTT 39*

## 2025-04-02 NOTE — PROGRESS NOTES
This patient was exposed to a person with a known CP- and has the potential for having acquired this pathogen of concern. The [place] Department of Health ([facility]) and CDC recommend that we screen this patient to prevent the spread of these organisms within our healthcare facility. Infection Prevention has placed orders for collecting a rectal swab for CP- to evaluate if this patient is now a carrier.   This patient was identified to have a low risk exposure in which case Contact Precautions are not necessary unless the patient tests positive.    Screening is voluntary.  Please notify Infection Prevention if the patient declines testing.  Additional information and resources can be found on the Infection Prevention [employer]point page.

## 2025-04-02 NOTE — PLAN OF CARE
19:00- 07:00  Goal Outcome Evaluation: Day 29 s/p induction of chemo.       Plan of Care Reviewed With: patient    Overall Patient Progress: improving     Pain: Controlled with scheduled gabapentin, PRN tylenol atarax and flexerel.   Neuro:WDL  CV:WDL  Resp:.WDL except - infrequent, non-productive cough, denies SOB.   GI:WDL  Skin: .WDL except- bruise in abdomen  Activity Assistance: independent  Safety/Falls Risk: Level of Risk: Moderate Risk    AVSS on RA. Went outside to smoke several times during shift. Double lumen PICC hep locked. Plan to start consolidation chemo therapy today.

## 2025-04-03 LAB
ABO + RH BLD: NORMAL
ALBUMIN SERPL BCG-MCNC: 3.4 G/DL (ref 3.5–5.2)
ALP SERPL-CCNC: 221 U/L (ref 40–150)
ALT SERPL W P-5'-P-CCNC: 49 U/L (ref 0–50)
ANION GAP SERPL CALCULATED.3IONS-SCNC: 12 MMOL/L (ref 7–15)
AST SERPL W P-5'-P-CCNC: 39 U/L (ref 0–45)
BACTERIA BLD CULT: NORMAL
BACTERIA BLD CULT: NORMAL
BASOPHILS # BLD MANUAL: 0.4 10E3/UL (ref 0–0.2)
BASOPHILS NFR BLD MANUAL: 4 %
BILIRUB SERPL-MCNC: 0.2 MG/DL
BLD GP AB SCN SERPL QL: NEGATIVE
BUN SERPL-MCNC: 9.5 MG/DL (ref 6–20)
CALCIUM SERPL-MCNC: 8.8 MG/DL (ref 8.8–10.4)
CHLORIDE SERPL-SCNC: 100 MMOL/L (ref 98–107)
CPR GENES ISLT/SPM QL: NEGATIVE
CREAT SERPL-MCNC: 0.61 MG/DL (ref 0.51–0.95)
EGFRCR SERPLBLD CKD-EPI 2021: >90 ML/MIN/1.73M2
EOSINOPHIL # BLD MANUAL: 0 10E3/UL (ref 0–0.7)
EOSINOPHIL NFR BLD MANUAL: 0 %
ERYTHROCYTE [DISTWIDTH] IN BLOOD BY AUTOMATED COUNT: 16.6 % (ref 10–15)
GALACTOMANNAN AG SERPL QL IA: NEGATIVE
GALACTOMANNAN AG SPEC IA-ACNC: 0.03
GGT SERPL-CCNC: 184 U/L (ref 5–36)
GLUCOSE SERPL-MCNC: 118 MG/DL (ref 70–99)
H CAPSUL AG UR QL IA: NOT DETECTED
H CAPSUL AG UR-MCNC: NOT DETECTED NG/ML
HCO3 SERPL-SCNC: 26 MMOL/L (ref 22–29)
HCT VFR BLD AUTO: 22.5 % (ref 35–47)
HGB BLD-MCNC: 7.2 G/DL (ref 11.7–15.7)
LDH SERPL L TO P-CCNC: 361 U/L (ref 0–250)
LYMPHOCYTES # BLD MANUAL: 1.2 10E3/UL (ref 0.8–5.3)
LYMPHOCYTES NFR BLD MANUAL: 13 %
MAGNESIUM SERPL-MCNC: 2.3 MG/DL (ref 1.7–2.3)
MCH RBC QN AUTO: 30.4 PG (ref 26.5–33)
MCHC RBC AUTO-ENTMCNC: 32 G/DL (ref 31.5–36.5)
MCV RBC AUTO: 95 FL (ref 78–100)
MONOCYTES # BLD MANUAL: 1.2 10E3/UL (ref 0–1.3)
MONOCYTES NFR BLD MANUAL: 13 %
MYELOCYTES # BLD MANUAL: 0.1 10E3/UL
MYELOCYTES NFR BLD MANUAL: 1 %
NEUTROPHILS # BLD MANUAL: 6.7 10E3/UL (ref 1.6–8.3)
NEUTROPHILS NFR BLD MANUAL: 70 %
NRBC # BLD AUTO: 0.1 10E3/UL
NRBC BLD MANUAL-RTO: 1 %
PHOSPHATE SERPL-MCNC: 3.3 MG/DL (ref 2.5–4.5)
PLAT MORPH BLD: ABNORMAL
PLATELET # BLD AUTO: 1471 10E3/UL (ref 150–450)
POLYCHROMASIA BLD QL SMEAR: SLIGHT
POTASSIUM SERPL-SCNC: 3.7 MMOL/L (ref 3.4–5.3)
PROT SERPL-MCNC: 6.8 G/DL (ref 6.4–8.3)
RBC # BLD AUTO: 2.37 10E6/UL (ref 3.8–5.2)
RBC MORPH BLD: ABNORMAL
SCANNED LAB RESULT: NORMAL
SCANNED LAB RESULT: NORMAL
SODIUM SERPL-SCNC: 138 MMOL/L (ref 135–145)
SPECIMEN EXP DATE BLD: NORMAL
SPECIMEN TYPE: NORMAL
URATE SERPL-MCNC: 3.7 MG/DL (ref 2.4–5.7)
WBC # BLD AUTO: 9.6 10E3/UL (ref 4–11)

## 2025-04-03 PROCEDURE — 85007 BL SMEAR W/DIFF WBC COUNT: CPT

## 2025-04-03 PROCEDURE — 250N000013 HC RX MED GY IP 250 OP 250 PS 637

## 2025-04-03 PROCEDURE — 258N000003 HC RX IP 258 OP 636: Performed by: STUDENT IN AN ORGANIZED HEALTH CARE EDUCATION/TRAINING PROGRAM

## 2025-04-03 PROCEDURE — 250N000013 HC RX MED GY IP 250 OP 250 PS 637: Performed by: HOSPITALIST

## 2025-04-03 PROCEDURE — 84550 ASSAY OF BLOOD/URIC ACID: CPT | Performed by: PHYSICIAN ASSISTANT

## 2025-04-03 PROCEDURE — 99418 PROLNG IP/OBS E/M EA 15 MIN: CPT | Performed by: INTERNAL MEDICINE

## 2025-04-03 PROCEDURE — 86923 COMPATIBILITY TEST ELECTRIC: CPT | Performed by: PHYSICIAN ASSISTANT

## 2025-04-03 PROCEDURE — 250N000013 HC RX MED GY IP 250 OP 250 PS 637: Performed by: STUDENT IN AN ORGANIZED HEALTH CARE EDUCATION/TRAINING PROGRAM

## 2025-04-03 PROCEDURE — 250N000011 HC RX IP 250 OP 636: Performed by: STUDENT IN AN ORGANIZED HEALTH CARE EDUCATION/TRAINING PROGRAM

## 2025-04-03 PROCEDURE — 80053 COMPREHEN METABOLIC PANEL: CPT

## 2025-04-03 PROCEDURE — 85014 HEMATOCRIT: CPT

## 2025-04-03 PROCEDURE — 85048 AUTOMATED LEUKOCYTE COUNT: CPT

## 2025-04-03 PROCEDURE — 83615 LACTATE (LD) (LDH) ENZYME: CPT

## 2025-04-03 PROCEDURE — 86900 BLOOD TYPING SEROLOGIC ABO: CPT

## 2025-04-03 PROCEDURE — 83735 ASSAY OF MAGNESIUM: CPT

## 2025-04-03 PROCEDURE — 82977 ASSAY OF GGT: CPT

## 2025-04-03 PROCEDURE — 250N000009 HC RX 250: Performed by: STUDENT IN AN ORGANIZED HEALTH CARE EDUCATION/TRAINING PROGRAM

## 2025-04-03 PROCEDURE — 250N000011 HC RX IP 250 OP 636: Mod: JZ | Performed by: STUDENT IN AN ORGANIZED HEALTH CARE EDUCATION/TRAINING PROGRAM

## 2025-04-03 PROCEDURE — 84100 ASSAY OF PHOSPHORUS: CPT | Performed by: PHYSICIAN ASSISTANT

## 2025-04-03 PROCEDURE — 250N000011 HC RX IP 250 OP 636

## 2025-04-03 PROCEDURE — 250N000012 HC RX MED GY IP 250 OP 636 PS 637: Performed by: STUDENT IN AN ORGANIZED HEALTH CARE EDUCATION/TRAINING PROGRAM

## 2025-04-03 PROCEDURE — 99233 SBSQ HOSP IP/OBS HIGH 50: CPT | Mod: FS | Performed by: INTERNAL MEDICINE

## 2025-04-03 PROCEDURE — 120N000002 HC R&B MED SURG/OB UMMC

## 2025-04-03 RX ORDER — ALBUTEROL SULFATE 90 UG/1
1-2 INHALANT RESPIRATORY (INHALATION)
Status: DISCONTINUED | OUTPATIENT
Start: 2025-04-03 | End: 2025-04-06 | Stop reason: HOSPADM

## 2025-04-03 RX ORDER — METHYLPREDNISOLONE SODIUM SUCCINATE 40 MG/ML
40 INJECTION INTRAMUSCULAR; INTRAVENOUS
Status: DISCONTINUED | OUTPATIENT
Start: 2025-04-03 | End: 2025-04-06 | Stop reason: HOSPADM

## 2025-04-03 RX ORDER — FUROSEMIDE 10 MG/ML
20 INJECTION INTRAMUSCULAR; INTRAVENOUS ONCE
Status: COMPLETED | OUTPATIENT
Start: 2025-04-03 | End: 2025-04-03

## 2025-04-03 RX ORDER — PREDNISOLONE ACETATE 10 MG/ML
2 SUSPENSION/ DROPS OPHTHALMIC 4 TIMES DAILY
Status: DISCONTINUED | OUTPATIENT
Start: 2025-04-03 | End: 2025-04-06 | Stop reason: HOSPADM

## 2025-04-03 RX ORDER — ONDANSETRON 8 MG/1
8 TABLET, FILM COATED ORAL EVERY 12 HOURS
Status: DISCONTINUED | OUTPATIENT
Start: 2025-04-03 | End: 2025-04-06 | Stop reason: HOSPADM

## 2025-04-03 RX ORDER — PROCHLORPERAZINE MALEATE 10 MG
10 TABLET ORAL EVERY 6 HOURS PRN
Status: DISCONTINUED | OUTPATIENT
Start: 2025-04-03 | End: 2025-04-03

## 2025-04-03 RX ORDER — ALLOPURINOL 300 MG/1
300 TABLET ORAL DAILY
Status: DISCONTINUED | OUTPATIENT
Start: 2025-04-03 | End: 2025-04-06 | Stop reason: HOSPADM

## 2025-04-03 RX ORDER — ALBUTEROL SULFATE 0.83 MG/ML
2.5 SOLUTION RESPIRATORY (INHALATION)
Status: DISCONTINUED | OUTPATIENT
Start: 2025-04-03 | End: 2025-04-06 | Stop reason: HOSPADM

## 2025-04-03 RX ORDER — DEXAMETHASONE 4 MG/1
4 TABLET ORAL EVERY 12 HOURS
Status: COMPLETED | OUTPATIENT
Start: 2025-04-03 | End: 2025-04-06

## 2025-04-03 RX ORDER — MEPERIDINE HYDROCHLORIDE 25 MG/ML
25 INJECTION INTRAMUSCULAR; INTRAVENOUS; SUBCUTANEOUS
Status: DISCONTINUED | OUTPATIENT
Start: 2025-04-03 | End: 2025-04-06 | Stop reason: HOSPADM

## 2025-04-03 RX ORDER — DIPHENHYDRAMINE HYDROCHLORIDE 50 MG/ML
25 INJECTION, SOLUTION INTRAMUSCULAR; INTRAVENOUS
Status: DISCONTINUED | OUTPATIENT
Start: 2025-04-03 | End: 2025-04-06 | Stop reason: HOSPADM

## 2025-04-03 RX ORDER — LORAZEPAM 0.5 MG/1
.5-1 TABLET ORAL EVERY 6 HOURS PRN
Status: DISCONTINUED | OUTPATIENT
Start: 2025-04-03 | End: 2025-04-03

## 2025-04-03 RX ORDER — LORAZEPAM 2 MG/ML
.5-1 INJECTION INTRAMUSCULAR EVERY 6 HOURS PRN
Status: DISCONTINUED | OUTPATIENT
Start: 2025-04-03 | End: 2025-04-03

## 2025-04-03 RX ORDER — DIPHENHYDRAMINE HYDROCHLORIDE 50 MG/ML
50 INJECTION, SOLUTION INTRAMUSCULAR; INTRAVENOUS
Status: DISCONTINUED | OUTPATIENT
Start: 2025-04-03 | End: 2025-04-06 | Stop reason: HOSPADM

## 2025-04-03 RX ORDER — EPINEPHRINE 1 MG/ML
0.3 INJECTION, SOLUTION, CONCENTRATE INTRAVENOUS EVERY 5 MIN PRN
Status: DISCONTINUED | OUTPATIENT
Start: 2025-04-03 | End: 2025-04-06 | Stop reason: HOSPADM

## 2025-04-03 RX ADMIN — FLUTICASONE FUROATE AND VILANTEROL TRIFENATATE 1 PUFF: 100; 25 POWDER RESPIRATORY (INHALATION) at 09:23

## 2025-04-03 RX ADMIN — ACYCLOVIR 400 MG: 400 TABLET ORAL at 09:24

## 2025-04-03 RX ADMIN — GABAPENTIN 400 MG: 300 CAPSULE ORAL at 19:45

## 2025-04-03 RX ADMIN — ACYCLOVIR 400 MG: 400 TABLET ORAL at 19:45

## 2025-04-03 RX ADMIN — HYDROXYZINE HYDROCHLORIDE 50 MG: 25 TABLET, FILM COATED ORAL at 04:02

## 2025-04-03 RX ADMIN — DEXAMETHASONE 4 MG: 4 TABLET ORAL at 16:57

## 2025-04-03 RX ADMIN — CEFEPIME 2 G: 2 INJECTION, POWDER, FOR SOLUTION INTRAVENOUS at 05:14

## 2025-04-03 RX ADMIN — ACETAMINOPHEN 650 MG: 325 TABLET, FILM COATED ORAL at 16:13

## 2025-04-03 RX ADMIN — FAMOTIDINE 20 MG: 20 TABLET, FILM COATED ORAL at 09:25

## 2025-04-03 RX ADMIN — HEPARIN, PORCINE (PF) 10 UNIT/ML INTRAVENOUS SYRINGE 5 ML: at 11:48

## 2025-04-03 RX ADMIN — SUCRALFATE ORAL 1 G: 1 SUSPENSION ORAL at 11:47

## 2025-04-03 RX ADMIN — FAMOTIDINE 20 MG: 20 TABLET, FILM COATED ORAL at 19:45

## 2025-04-03 RX ADMIN — PAROXETINE HYDROCHLORIDE 20 MG: 20 TABLET, FILM COATED ORAL at 09:25

## 2025-04-03 RX ADMIN — PREDNISOLONE ACETATE 2 DROP: 10 SUSPENSION/ DROPS OPHTHALMIC at 19:45

## 2025-04-03 RX ADMIN — ACETAMINOPHEN 650 MG: 325 TABLET, FILM COATED ORAL at 08:54

## 2025-04-03 RX ADMIN — Medication 5 ML: at 05:14

## 2025-04-03 RX ADMIN — SUCRALFATE ORAL 1 G: 1 SUSPENSION ORAL at 16:13

## 2025-04-03 RX ADMIN — HEPARIN, PORCINE (PF) 10 UNIT/ML INTRAVENOUS SYRINGE 5 ML: at 21:19

## 2025-04-03 RX ADMIN — HYDROXYZINE HYDROCHLORIDE 50 MG: 25 TABLET, FILM COATED ORAL at 16:13

## 2025-04-03 RX ADMIN — CYTARABINE 5730 MG: 100 INJECTION, SOLUTION INTRATHECAL; INTRAVENOUS; SUBCUTANEOUS at 17:39

## 2025-04-03 RX ADMIN — Medication 1000 UNITS: at 09:25

## 2025-04-03 RX ADMIN — PANTOPRAZOLE SODIUM 40 MG: 40 TABLET, DELAYED RELEASE ORAL at 16:13

## 2025-04-03 RX ADMIN — ENOXAPARIN SODIUM 40 MG: 40 INJECTION SUBCUTANEOUS at 19:45

## 2025-04-03 RX ADMIN — ONDANSETRON HYDROCHLORIDE 8 MG: 8 TABLET, FILM COATED ORAL at 16:57

## 2025-04-03 RX ADMIN — GABAPENTIN 400 MG: 300 CAPSULE ORAL at 14:16

## 2025-04-03 RX ADMIN — SUCRALFATE ORAL 1 G: 1 SUSPENSION ORAL at 21:00

## 2025-04-03 RX ADMIN — PREDNISOLONE ACETATE 2 DROP: 10 SUSPENSION/ DROPS OPHTHALMIC at 14:16

## 2025-04-03 RX ADMIN — FUROSEMIDE 20 MG: 10 INJECTION, SOLUTION INTRAMUSCULAR; INTRAVENOUS at 11:47

## 2025-04-03 RX ADMIN — ALLOPURINOL 300 MG: 300 TABLET ORAL at 11:47

## 2025-04-03 RX ADMIN — LORATADINE 10 MG: 10 TABLET ORAL at 09:25

## 2025-04-03 RX ADMIN — PANTOPRAZOLE SODIUM 40 MG: 40 TABLET, DELAYED RELEASE ORAL at 09:24

## 2025-04-03 RX ADMIN — GABAPENTIN 400 MG: 300 CAPSULE ORAL at 09:24

## 2025-04-03 RX ADMIN — CYCLOBENZAPRINE HYDROCHLORIDE 10 MG: 5 TABLET, FILM COATED ORAL at 22:57

## 2025-04-03 RX ADMIN — ACETAMINOPHEN 650 MG: 325 TABLET, FILM COATED ORAL at 04:02

## 2025-04-03 RX ADMIN — SUCRALFATE ORAL 1 G: 1 SUSPENSION ORAL at 09:24

## 2025-04-03 NOTE — PLAN OF CARE
"Goal Outcome Evaluation:      Plan of Care Reviewed With: patient    Overall Patient Progress: no changeOverall Patient Progress: no change    Outcome Evaluation: 0700 - 1900    0700 - 1900    /72 (BP Location: Left arm)   Pulse 76   Temp 99.8  F (37.7  C) (Oral)   Resp 18   Ht 1.626 m (5' 4\")   Wt 81.2 kg (179 lb 1.6 oz)   LMP 01/01/2007 (Approximate)   SpO2 94%   BMI 30.74 kg/m      Reason for admission: AML, Induction with 7+3 (D1=3/5/25). BMBx completed 3/31, flow with 4.3% blasts. Consolidation therapy with HiDAC (C1D1=4/3/2025).  Activity: Independent   Pain: Rated head and hip pain a 3/10, given PRN Tylenol x2 and PRN Atarax x1.  Neuro: A&Ox4, WDL  Cardiac: WDL, VSS  Respiratory: WDL on RA, infrequent productive cough present.  GI/: Voiding spontaneously, last BM 4/3, denies nausea  Diet: Regular   Lines: PICC  Wounds: BMBx site FLORES    Continue to monitor and follow POC    "

## 2025-04-03 NOTE — PLAN OF CARE
Goal Outcome Evaluation:      Plan of Care Reviewed With: patient    Overall Patient Progress: no changeOverall Patient Progress: no change    Outcome Evaluation: Continue with POC    No acute changes overnight. Aox4, VSS on RA. PRN tylenol and atarax given. Denies SOB. Up independently in halls and out to smoke. PICC hep locked. Call light in reach and able to make needs known

## 2025-04-03 NOTE — PLAN OF CARE
Goal Outcome Evaluation:  [id number]  Pt is admitted to [facility] on 2/26/25 for further work-up and management.  AVSS, alert and oriented 4, pain is 3/10 at left hip, given PRN Tylenol x 2, PRN Flexeril x 1.  PCR for MD Surveillance Carbapenemase-Producing Organism Culture done. Up independent, voiding adequately, LBM 4/2  Continue to monitor care.

## 2025-04-03 NOTE — PLAN OF CARE
Nursing Focus: Chemotherapy    D: Positive blood return via PICC . Insertion site is clean/dry/intact, dressing intact with no complaints of pain.  Pre infusion assessment documented in Flowsheet (if applicable).      I: Premedications given per order (see electronic medical administration record). Dose #1 of Cytarabine started to infuse over 3 hours. Reviewed pt teaching on chemotherapy side effects.  Pt denies need for further teaching. Chemotherapy double checked per protocol by two chemotherapy competent RN's.     A: Tolerating infusion well. Denies nausea and or pain.     P: Continue to monitor urine output and symptoms of nausea. Screen for symptoms of toxicity.

## 2025-04-03 NOTE — PROGRESS NOTES
[facility]    Progress Note  Hematology / Oncology     Date of Admission:  2/26/2025  Hospital Day #: 36   Date of Service (when I saw the patient): 04/03/2025    Assessment & Plan   Alejandra Villegas is a 59 year old female with a past medical history significant for COPD, migraines, rheumatoid arthritis, aortic stenosis, Afib, and anxiety who presented to an outside hospital with cytopenias and was found on BMBx to have hypercellular marrow with 4% blasts, 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 She was subsequently admitted to [facility] on 2/26/25 for further work-up and management and pathology re-review was most consistent with AML with NPM1 mutation by WHO 2022 (which does not require a specific blast threshold). Following further multidisciplinary discussion, she started intensive induction with 7+3 (D1=3/5/25). Her course has been complicated by neutropenic fevers, epigastric pain, influenza A, AOM, and L TM perforation. Midpoint and restaging BMBx without evidence of disease. Now admitted for consolidation with HiDAC (C1D1=4/3/25).    TODAY:  - D30 7+3 induction. D1 of HiDAC.  - Restaging BMBx completed 3/31. Flow with 4.3% blasts of unusual phenotypes, and morph without dysplasia or blasts. Cytogenetics, molecular pending.   - Afebrile >48 hours. Infectious workup negative thus far, BCx remain NGTD. Cryptococcus and fungitell negative, histoplasma pending. Discontinue Cefepime (4/1-4/3).   - With consideration of lower O2 sats (87-92), weight gain (176 admission weight ? 179 today), and pulmonary edema on CXR from 4/1 will trial 20 mg IV lasix today. Denies SOB and endorses baseline cough secondary to COPD/smoking. Continue to monitor fluid status.   - Continue best supportive cares.    HEME  # AML with NPM1 mutation  Had been seen by PCP Dec 2024 w/ progressive fatigue and nausea where she was found leukopenic WBC  2.4 and neutropenic w/  ANC 0.3. Hgb 12. Was referred to local hematology/oncology clinic for further evaluation and seen by Dr. Samina Harris. BMBx 2/10/25 done at OSH showed hypercellular marrow w/ trilineage hematopoiesis w/ dyspoiesis with mildly elevated blasts of 4% on morphology. NGS w/ NPM1, IDH2, and NRAS mutations. She was admitted to [facility] 2/26/25 for further workup and management. Slides were re-reviewed by [facility] Hematopathology, who noted hypercellular marrow with 8% blasts by morphology. Despite relatively low blast percentage, findings were felt most consistent with a diagnosis AML with NPM1 mutation by WHO 2022 (which does not require a specific blast threshold). Following interdepartmental discussions and review of the available literature, patient was started on intensive induction with 7+3 (Day 1=3/5/25). Midcycle BMBx 3/19/25: Flow with no increase in myeloid blasts and no abnormal myeloid blast population. Morphology with no morphologic or immunophenotypic evidence of acute myeloid leukemia. D28 BMBx completed 3/31/25. Flow with 4.3% blasts of unusual phenotypes, and morph without dysplasia or blasts. Cytogenetics, molecular pending. Now admitted for consolidation with HiDAC (C1D1=4/3/2025).  - PICC placed 3/5/2025, plan to discontinue on discharge.   - Baseline cardiopulmonary studies:  - Echo w/ EF 60-65%, mild known aortic stenosis.   - EKG (2/27) with NSR, QTc 412  - CXR (2/26) with mildly increased streaky bibasilar opacities, atelectasis or edema. No consolidation.   - Baseline viral serologies: CMV IgG+, EBV IgG+, HepB sAg-, HepB cAb-, HepB sAb-, HSV1+/2+, HIV-  - HLA Typing sent on admission. Message sent to notify BMT team x2/27 for IP consult. Have not yet seen patient, message sent x4/2 to which they noted efforts to see her urgently prior to discharge.    Treatment plan: HiDAC (C1D1=4/3/2025)   - Cytarabine 3 g/m  IV q12h D1-3   - Neulasta 6 mg D5 - requested for 4/7 at [facility]  Supportive  medications: Zofran 8 mg q12h, Dexamethasone 4 mg q12h D1-3, Prednisolone eye  drops QID D1-5     # Pancytopenia  Secondary to underlying hematologic malignancy and exacerbated by recent chemotherapy.  - Follow daily CBC with differential  - Transfuse to keep Hgb >7, plt >10K  - Blood consent obtained 2/26/25 on admission and placed in patient's paper chart.      ID  # Neutropenic fever, resolved  # Influenza A, resolved  Episode #1:  2/25: On admission, patient noted subjective fever with chills and rhinitis beginning 2/25 PM prior to admission. No other localizing s/sx of infection. She was afebrile on admission but spiked a low-grade fever to 100.7 on 2/26 PM. Blood and urine cultures negative, CXR with streaky opacities but no burt consolidation. Work-up positive for influenza A, and she completed a course of Tamiflu 75 mg BID x5 days (2/26-3/3). She also received a short empiric course of IV cefepime (2/26-3/1) given concurrent neutropenia.  Episode #2:  3/13: Febrile to 100.4 overnight x3/13. No localizing/new symptoms. OVSS. Patient was wearing heated jacket that she attributed to temperature, no documentation noted of notifying cross cover MD. No further ID workup (BCx, UA, UC, or CXR) completed and no antibiotic changes made. Given borderline temperature, decision made to continue monitoring and no further work-up was undertaken.  Episode #3:  3/19: Tmax 100.6 overnight. Asymptomatic, although notes sweating upon waking the following morning. CXR with scattered patchy densities, especially in the lung bases. Respiratory panel negative. UA with protein and blood but otherwise bland, UCx negative. BCx NGTD. A CT C/A/P was done (for other reasons, as below) and notable for findings suggestive of esophagitis as well as possible bronchitis/small airways disease. She was treated with a 7-day course of IV cefepime (3/18-3/25) and fever did not recur.  Episode #4:  4/1: Tmax 101.4 overnight. Nonproductive cough,  otherwise no new focal infectious symptoms. CXR with diffuse hazy opacities, UA bland, UCx NGTD. BCx remain NGTD. Cryptococcus and fungitell negative, histoplasma pending. Discontinue Cefepime (4/1-4/3).   - monitor for recurrent fevers    # ID prophylaxis  - Acyclovir 400 mg BID  - Levaquin 500 mg daily - when ANC <1.0  - Posaconazole 300 mg daily - HELD for consolidation - resume on discharge given active smoker   - Vori w/ $1.60 copay, posa requiring PA also w/ $1.60 copay. Rotated from deny to vori (3/13-3/17) w/ completion of chemotherapy, then transitioned to posa x3/18 given visual hallucinations x3/17     GI  # GERD, improved  # Epigastric pain, resolved  Reported recent increase in symptoms in the days leading up to admission and initiated famotidine. Persistent/progressive symptoms throughout admission. Cardiac work-up reassuring, lipase negative. Increased H2B to BID (3/9); given persistent symptoms, PPI started (3/14). Increased to BID (3/19). CT C/A/P with evidence of esophagitis (thought due to reflux/recent chemotherapy) and small hiatal hernia. Symptoms have improved on maximal medical therapy as below.  - Continue famotidine 20 mg BID  - Continue Protonix 40 mg BID  - GI cocktail PRN  - TUMS PRN  - GI consulted, EGD deferred for now as risks thought to outweigh the benefits in the setting of neutropenia; can reassess upon count recovery  - Continue to encourage lifestyle modifications: avoid straws, carbonated beverages, GERD precautions    # Risk of malnutrition  Secondary to esophagitis/GERD/mucositis picture as above, patient intake has decreased to small servings, mostly eating soup. On regular adult diet with thin liquids per SLP. Recommended trial of high caloric liquids, patient tried Strawberry Ensure and plans to continue to order with meals.  - RD following; appreciate recs     # Nausea/vomiting, improving  Patient noted ongoing nausea w/ occasional vomiting starting Dec 2024. Has been  managed with PRN Zofran.  Endorsed nausea on admission, now resolved.   - PRN Zofran and compazine  - Could consider trial of PRN Zyprexa if 3rd agent is desired/required     PULM  # COPD  Longstanding history of COPD. On admission patient reports symptoms are manageable with no recent exacerbations. Most recent PFTs (2018) were normal.  - Continue PTA Breo Ellipta inhaler and PRN DuoNebs      CV  # Essential hypertension  - PTA lisinopril 10 mg daily HELD 3/11 w/ soft BPs (90s-100s/50s-60s), resume pending daily trends    # Infrarenal abdominal aortic aneurysm  Noted incidentally on CT C/A/P (3/19/25), measuring 3.4 cm in diameter.  - Attention on follow-up imaging    RENAL/FEN  # Stage 2 CKD  Baseline Cr ~ 0.7. On admission Cr 0.77.  - Continue to trend on daily labs and encourage PO hydration     NEURO  # Chronic migraine w/o aura  # Chronic headaches  Present since childhood. Reportedly triggered by neck manipulation/movement. Reportedly well-managed with regimen below. Headaches occurring throughout admission with daily APAP use, also chronic and managed with APAP PRN at home. Patient notes that her headaches throughout admission have been consistent with her typical daily headaches with no reported changes in character, quality, location, severity, or frequency. She denies new associated neurological changes. Considered LP to exclude CNS leukemia, but given this reassuring history and absence of other indications for LP (no hyperleukocytosis, no monocytic phenotype, no FLT3 mutation, etc), this was ultimately deferred.  - APAP PRN  - Continue PTA sumatriptan and cyclobenzaprine PRN  - Could reconsider need for LP if headache worsens/changes in character or quality in the future    MISC  # Maculopapular rash  Noted on 3/25. Pruritic, localized to the neck, right shoulder, and flexural area behind the right knee. Clinically, affected areas consist of coalescing erythematous macules with scattered papules. No  obvious vesicles or bullae. DDx includes contact dermatitis (favored, possibly due to CHG wipes) versus atopic dermatitis (flexural areas raise suspicion for this) versus drug eruption versus other. Improved as of 3/26 with triamcinolone ointment.  - Continue triamcinolone ointment 0.1% BID to the affected areas  - Okay to defer CHG wipes given concern for contact dermatitis  - Monitor clinically    # BROOKS  # MDD  # At risk for adjustment disorder  Patient reports good control of anxiety/depressive symptoms prior to admission. Did note feeling overwhelmed by new diagnosis. Offered health psychology or cordelia, which she declined.  - Readdress [facility] psych consult as indicated  - Continue PTA paroxetine   - Atarax as needed     # Tobacco use disorder  Has smoked since age 16, 1.5 ppd (roughly 65 pack years). Attempting to cut back as recently as 01/2025 to 0.5 ppd. We discussed the risks of smoking during induction chemotherapy including increased risk for infection in setting of severe neutropenia that could further complicate course, placing her at risk for severe complications that could be life-threatening or even fatal.  - Encourage smoking cessation; patient continues to smoke at this time, despite understanding/acknowledgement of the risks.  - Trialed nicotine patch, then self-discontinued after reporting that she felt the nicotine patch precipitated an anxiety attack/episode of chest pain on 3/9. Offered a lower dose versus alternative form of NRT, which patient declined.      # Degenerative disc disease, L5-S1   PTA gabapentin 400 mg TID prn. Patient reported increased L sciatic pain, had taken gabapentin TID on 3/29 with improvement in symptoms. Will schedule gabapentin 400 mg TID per patient request. Improving as of 3/31.  - Continue to monitor symptoms    Chronic Problems:  # Vitamin D deficiency - Continue PTA vit D supplement  # Seasonal allergies - Claritin 10 mg daily  # Rheumatoid arthritis - Patient  reported trying treatment though was unable to tolerate well. Managed with Tylenol PRN. Most recent RF >650 (2/10/25). JI negative.    # Prediabetes - Last A1c 6.0 x12/9/24. Has been managing with lifestyle modifications.   # Mixed hyperlipidemia - Lipid panel has remained at goal, 1/14/25 panel w/ cholesterol 163, , LDL 92, HDL 45.   - Held PTA atorvastatin on admission due to chemotherapy interactions. Consider rotation to rosuvastatin for better drug-drug interaction profile.   # H/o aortic stenosis - Patient noted on admission longstanding history of aortic stenosis. Pre-chemo echo w/ EF 60-65% and mild aortic stenosis and insufficiency. No previous echo to compare.     Resolved Hospital Problems:  # Mild hyponatremia, resolved - New mild hyponatremia noted 3/13 with Na 135 ? 130. Asymptomatic. Now resolved.  # Non-radiating chest pain, resolved - for details see note from 3/21 and prior   # Urinary frequency + Dysuria, resolved - On admission, patient noted longstanding history of urinary frequency though new mild dysuria. UA (2/26) negative, UC with no growth. Symptoms have since resolved.  # R AOM with purulent effusion and Small L TM perforation, resolved - ENT consulted, appreciate recs. Completed 7-day course Levaquin 750mg daily and 5 day course of floxin drops to left ear for perforation     Clinically Significant Risk Factors               # Hypoalbuminemia: Lowest albumin = 3.2 g/dL at 3/24/2025  4:59 AM, will monitor as appropriate     # Hypertension: Noted on problem list             # Moderate Malnutrition: based on nutrition assessment and treatment provided per dietitian's recommendations.      # Financial/Environmental Concerns: none  # Asthma: noted on problem list       Fluids/Electrolytes/Nutrition   - IVF bolus PRN   - PRN lyte replacement per standing protocol  - Regular diet as tolerated      Lines: PICC     PPX  VTE: Lovenox held 3/13 for TCP, resumed 3/28   GI: Pepcid,  Protonix  Bowels: prn senna and miralax  Activity: as tolerated    Code Status: DNR/DNI    Disposition: Inpatient admission to Heme Malignancy service for treatment of AML; discharge to home pending tolerance of consolidation chemotherapy.  Follow-up: Will follow with Dr. Harris as primary oncologist and go to [facility] for twice weekly labs/transfusions (M/Th).    Medically Ready for Discharge: Anticipated in 2-4 Days      Staffed with Dr. Byrd.    I spent 60 minutes in the care of this patient today, which included time necessary for review of interval events, obtaining history and physical exam, ordering medication(s)/test(s) as medically indicated, discussion with interdisciplinary/consult team(s), care coordination, and documentation time.     Andrey Crowell PA-C  Hematology/Oncology  Pager [phone]    Interval History   No acute overnight events. Nursing notes reviewed.     Alejandra is feeling well this morning. She reports dry mouth overnight, causing her to increase her oral fluid intake. She prefers to drink energy drinks and pop over water. Improves with supportive cares. Denies oral lesions or pain. Discussed plan for chemotherapy initiation today, which is pleasing to her. Discussed treatment course, medication side effects, and necessary follow up. She is very hopeful to discharge this weekend. Denies chest pain, shortness of breath, nausea/vomiting/diarrhea, vision changes, rashes, peripheral edema. All concerns were addressed and questions were answered.    A comprehensive review of symptoms was performed and was negative except as detailed in the interval history above.    Physical Exam   Vital Signs with Ranges  Temp:  [98.6  F (37  C)-99.6  F (37.6  C)] 98.9  F (37.2  C)  Pulse:  [77-88] 88  Resp:  [20-24] 20  BP: (117-137)/(70-83) 137/83  SpO2:  [87 %-94 %] 92 %    I/O last 3 completed shifts:  In: 100 [IV Piggyback:100]  Out: 300 [Urine:300]    Vitals:    03/31/25 1014 04/01/25  0948 04/02/25 1017   Weight: 80.7 kg (178 lb) 80.7 kg (177 lb 14.4 oz) 81.6 kg (180 lb)     Constitutional: Pleasant and cooperative female, in NAD. Alert, interactive, non-toxic. Smiling and conversational.  HEENT: NC/AT, sclera clear, conjunctiva normal, MMM without lesion, thrush, or abscess; poor dentition.  Respiratory: No increased work of breathing. Breath sounds mildly diminished to the lung bases bilaterally. Slight inspiratory wheeze in b/l lung fields.   Cardiovascular: RRR, systolic murmur. No peripheral edema.   GI: Normal bowel sounds. Abdomen with central obesity, is soft, non-distended and non-tender to palpation.  Skin: Warm, dry, well-perfused.   Musculoskeletal: Diminished muscle bulk, no gross deformities.  Neurologic: A&O. Answers questions appropriately, speech normal. Normal gait. Moves all extremities spontaneously.  Psychiatric: Normal mood and affect.  Vascular access:  PICC on RUE CDI, non-tender, no surrounding erythema.    Medications   Current Facility-Administered Medications   Medication Dose Route Frequency Provider Last Rate Last Admin     Current Facility-Administered Medications   Medication Dose Route Frequency Provider Last Rate Last Admin    acyclovir (ZOVIRAX) tablet 400 mg  400 mg Oral BID Chelsie Murphy PA-C   400 mg at 04/02/25 2035    ceFEPIme (MAXIPIME) 2 g vial to attach to  mL bag for ADULTS or NS 50 mL bag for PEDS  2 g Intravenous Q8H Omar Bonilla  mL/hr at 04/02/25 1326 2 g at 04/03/25 0514    enoxaparin ANTICOAGULANT (LOVENOX) injection 40 mg  40 mg Subcutaneous Q24H Nery Crowell PA-C   40 mg at 04/02/25 2036    famotidine (PEPCID) tablet 20 mg  20 mg Oral BID Zully Garcia PA-C   20 mg at 04/02/25 2036    fluticasone-vilanterol (BREO ELLIPTA) 100-25 MCG/ACT inhaler 1 puff  1 puff Inhalation Daily Chelsie Murphy PA-C   1 puff at 04/02/25 1019    gabapentin (NEURONTIN) capsule 400 mg  400 mg Oral TID Radha  Zully ORTIZ PA-C   400 mg at 04/02/25 2035    heparin lock flush 10 unit/mL injection 5-20 mL  5-20 mL Intracatheter Q24H Chelsie Murphy PA-C   5 mL at 04/03/25 0514    [Held by provider] lisinopril (ZESTRIL) tablet 10 mg  10 mg Oral Daily Jyoti Tenorio PA-C   10 mg at 03/10/25 1039    loratadine (CLARITIN) tablet 10 mg  10 mg Oral Daily Chelsie Murphy PA-C   10 mg at 04/02/25 1020    pantoprazole (PROTONIX) EC tablet 40 mg  40 mg Oral BID AC Nery Crowell PA-C   40 mg at 04/02/25 1651    PARoxetine (PAXIL) tablet 20 mg  20 mg Oral QAM Chelsie Murphy PA-C   20 mg at 04/02/25 1020    [Held by provider] posaconazole (NOXAFIL) DR tablet TBEC 300 mg  300 mg Oral QA Nery Crowell PA-C   300 mg at 04/01/25 1026    sodium chloride (PF) 0.9% PF flush 10-40 mL  10-40 mL Intracatheter Q8H Chelsie Murphy PA-C   10 mL at 04/03/25 0514    sucralfate (CARAFATE) suspension 1 g  1 g Oral 4x Daily AC & HS Aman Cervantes MD   1 g at 04/02/25 2213    triamcinolone (KENALOG) 0.1 % ointment   Topical BID Chelsie Davila PA-C   Given at 03/28/25 1035    Vitamin D3 (CHOLECALCIFEROL) tablet 1,000 Units  1,000 Units Oral Daily Chelsie Murphy PA-C   1,000 Units at 04/02/25 1019     Antiinfectives  Anti-infectives (From now, onward)      Start     Dose/Rate Route Frequency Ordered Stop    04/01/25 0530  ceFEPIme (MAXIPIME) 2 g vial to attach to  mL bag for ADULTS or NS 50 mL bag for PEDS         2 g  over 30 Minutes Intravenous EVERY 8 HOURS 04/01/25 0456      03/18/25 0800  [Held by provider]  posaconazole (NOXAFIL) DR tablet TBEC 300 mg        (On hold since Tue 4/1/2025 at 1349 until manually unheld; held by Nery Crowell PA-CHold Reason: Other)    300 mg Oral EVERY MORNING 03/17/25 1242      02/26/25 2000  acyclovir (ZOVIRAX) tablet 400 mg         400 mg Oral 2 TIMES DAILY 02/26/25 1307            Data   CBC   Recent  Labs   Lab 04/03/25 0437 04/02/25  0502 04/01/25  0444 03/31/25  0353   WBC 9.6 8.7 7.5 5.9   RBC 2.37* 2.29* 2.41* 2.54*   HGB 7.2* 7.2* 7.4* 7.9*   HCT 22.5* 22.3* 22.3* 23.8*   MCV 95 97 93 94   MCH 30.4 31.4 30.7 31.1   MCHC 32.0 32.3 33.2 33.2   RDW 16.6* 16.5* 16.1* 15.6*   PLT 1,471* 1,501* 1,538*  581* 1,451*     CMP   Recent Labs   Lab 04/03/25 0437 04/02/25  0502 04/01/25 0444 03/31/25  0353    141 138 139   POTASSIUM 3.7 3.5 3.6 3.6   CHLORIDE 100 102 99 101   CO2 26 27 26 25   ANIONGAP 12 12 13 13   * 137* 114* 116*   BUN 9.5 9.5 7.9 10.6   CR 0.61 0.68 0.68 0.61   GFRESTIMATED >90 >90 >90 >90   TOBIN 8.8 8.9 8.8 8.8   MAG 2.3 2.2 2.1 2.1   PHOS 3.3 3.7 3.4 4.0   PROTTOTAL 6.8 6.5 6.6 6.7   ALBUMIN 3.4* 3.3* 3.5 3.5   BILITOTAL 0.2 0.2 0.2 0.2   ALKPHOS 221* 180* 158* 136   AST 39 36 25 17   ALT 49 37 18 13     LFTs   Recent Labs   Lab 04/03/25 0437   PROTTOTAL 6.8   ALBUMIN 3.4*   BILITOTAL 0.2   ALKPHOS 221*   AST 39   ALT 49     Coagulation Studies   Recent Labs   Lab 04/02/25  0502   INR 1.12   PTT 39*

## 2025-04-04 ENCOUNTER — APPOINTMENT (OUTPATIENT)
Dept: GENERAL RADIOLOGY | Facility: CLINIC | Age: 60
DRG: 834 | End: 2025-04-04
Payer: MEDICARE

## 2025-04-04 VITALS
RESPIRATION RATE: 18 BRPM | BODY MASS INDEX: 30.58 KG/M2 | WEIGHT: 179.1 LBS | OXYGEN SATURATION: 91 % | SYSTOLIC BLOOD PRESSURE: 135 MMHG | TEMPERATURE: 98.1 F | HEART RATE: 76 BPM | DIASTOLIC BLOOD PRESSURE: 72 MMHG | HEIGHT: 64 IN

## 2025-04-04 LAB
ALBUMIN SERPL BCG-MCNC: 3.7 G/DL (ref 3.5–5.2)
ALP SERPL-CCNC: 228 U/L (ref 40–150)
ALT SERPL W P-5'-P-CCNC: 43 U/L (ref 0–50)
ANION GAP SERPL CALCULATED.3IONS-SCNC: 12 MMOL/L (ref 7–15)
APTT PPP: 34 SECONDS (ref 22–38)
AST SERPL W P-5'-P-CCNC: 25 U/L (ref 0–45)
BASOPHILS # BLD AUTO: 0.1 10E3/UL (ref 0–0.2)
BASOPHILS NFR BLD AUTO: 1 %
BILIRUB SERPL-MCNC: 0.3 MG/DL
BUN SERPL-MCNC: 12.5 MG/DL (ref 6–20)
CALCIUM SERPL-MCNC: 9.4 MG/DL (ref 8.8–10.4)
CHLORIDE SERPL-SCNC: 99 MMOL/L (ref 98–107)
CREAT SERPL-MCNC: 0.57 MG/DL (ref 0.51–0.95)
EGFRCR SERPLBLD CKD-EPI 2021: >90 ML/MIN/1.73M2
ELLIPTOCYTES BLD QL SMEAR: SLIGHT
EOSINOPHIL # BLD AUTO: 0 10E3/UL (ref 0–0.7)
EOSINOPHIL NFR BLD AUTO: 0 %
ERYTHROCYTE [DISTWIDTH] IN BLOOD BY AUTOMATED COUNT: 16.7 % (ref 10–15)
FIBRINOGEN PPP-MCNC: 827 MG/DL (ref 170–510)
GLUCOSE SERPL-MCNC: 149 MG/DL (ref 70–99)
HCO3 SERPL-SCNC: 28 MMOL/L (ref 22–29)
HCT VFR BLD AUTO: 25.3 % (ref 35–47)
HGB BLD-MCNC: 8.2 G/DL (ref 11.7–15.7)
IMM GRANULOCYTES # BLD: 0.1 10E3/UL
IMM GRANULOCYTES NFR BLD: 1 %
INR PPP: 1.11 (ref 0.85–1.15)
LDH SERPL L TO P-CCNC: 364 U/L (ref 0–250)
LYMPHOCYTES # BLD AUTO: 0.5 10E3/UL (ref 0.8–5.3)
LYMPHOCYTES NFR BLD AUTO: 6 %
MAGNESIUM SERPL-MCNC: 2.8 MG/DL (ref 1.7–2.3)
MCH RBC QN AUTO: 30.7 PG (ref 26.5–33)
MCHC RBC AUTO-ENTMCNC: 32.4 G/DL (ref 31.5–36.5)
MCV RBC AUTO: 95 FL (ref 78–100)
MONOCYTES # BLD AUTO: 1 10E3/UL (ref 0–1.3)
MONOCYTES NFR BLD AUTO: 12 %
NEUTROPHILS # BLD AUTO: 6.5 10E3/UL (ref 1.6–8.3)
NEUTROPHILS NFR BLD AUTO: 80 %
NRBC # BLD AUTO: 0.1 10E3/UL
NRBC BLD AUTO-RTO: 1 /100
PHOSPHATE SERPL-MCNC: 4.3 MG/DL (ref 2.5–4.5)
PLAT MORPH BLD: ABNORMAL
PLATELET # BLD AUTO: 1699 10E3/UL (ref 150–450)
POLYCHROMASIA BLD QL SMEAR: ABNORMAL
POTASSIUM SERPL-SCNC: 4.5 MMOL/L (ref 3.4–5.3)
PROT SERPL-MCNC: 7.2 G/DL (ref 6.4–8.3)
RBC # BLD AUTO: 2.67 10E6/UL (ref 3.8–5.2)
RBC MORPH BLD: ABNORMAL
SODIUM SERPL-SCNC: 139 MMOL/L (ref 135–145)
URATE SERPL-MCNC: 4.2 MG/DL (ref 2.4–5.7)
WBC # BLD AUTO: 8.1 10E3/UL (ref 4–11)

## 2025-04-04 PROCEDURE — 84550 ASSAY OF BLOOD/URIC ACID: CPT | Performed by: PHYSICIAN ASSISTANT

## 2025-04-04 PROCEDURE — 99418 PROLNG IP/OBS E/M EA 15 MIN: CPT | Performed by: INTERNAL MEDICINE

## 2025-04-04 PROCEDURE — 258N000003 HC RX IP 258 OP 636: Performed by: STUDENT IN AN ORGANIZED HEALTH CARE EDUCATION/TRAINING PROGRAM

## 2025-04-04 PROCEDURE — 250N000011 HC RX IP 250 OP 636

## 2025-04-04 PROCEDURE — 84100 ASSAY OF PHOSPHORUS: CPT | Performed by: PHYSICIAN ASSISTANT

## 2025-04-04 PROCEDURE — 71045 X-RAY EXAM CHEST 1 VIEW: CPT

## 2025-04-04 PROCEDURE — 250N000013 HC RX MED GY IP 250 OP 250 PS 637: Performed by: HOSPITALIST

## 2025-04-04 PROCEDURE — 250N000011 HC RX IP 250 OP 636: Performed by: STUDENT IN AN ORGANIZED HEALTH CARE EDUCATION/TRAINING PROGRAM

## 2025-04-04 PROCEDURE — 85730 THROMBOPLASTIN TIME PARTIAL: CPT | Performed by: PHYSICIAN ASSISTANT

## 2025-04-04 PROCEDURE — 99233 SBSQ HOSP IP/OBS HIGH 50: CPT | Mod: FS | Performed by: INTERNAL MEDICINE

## 2025-04-04 PROCEDURE — 120N000002 HC R&B MED SURG/OB UMMC

## 2025-04-04 PROCEDURE — 85025 COMPLETE CBC W/AUTO DIFF WBC: CPT

## 2025-04-04 PROCEDURE — 83615 LACTATE (LD) (LDH) ENZYME: CPT

## 2025-04-04 PROCEDURE — 250N000012 HC RX MED GY IP 250 OP 636 PS 637: Performed by: STUDENT IN AN ORGANIZED HEALTH CARE EDUCATION/TRAINING PROGRAM

## 2025-04-04 PROCEDURE — 85610 PROTHROMBIN TIME: CPT | Performed by: PHYSICIAN ASSISTANT

## 2025-04-04 PROCEDURE — 250N000013 HC RX MED GY IP 250 OP 250 PS 637

## 2025-04-04 PROCEDURE — 71045 X-RAY EXAM CHEST 1 VIEW: CPT | Mod: 26 | Performed by: RADIOLOGY

## 2025-04-04 PROCEDURE — 85384 FIBRINOGEN ACTIVITY: CPT | Performed by: PHYSICIAN ASSISTANT

## 2025-04-04 PROCEDURE — 83735 ASSAY OF MAGNESIUM: CPT

## 2025-04-04 PROCEDURE — 250N000013 HC RX MED GY IP 250 OP 250 PS 637: Performed by: STUDENT IN AN ORGANIZED HEALTH CARE EDUCATION/TRAINING PROGRAM

## 2025-04-04 PROCEDURE — 82040 ASSAY OF SERUM ALBUMIN: CPT

## 2025-04-04 RX ORDER — ASPIRIN 81 MG/1
81 TABLET ORAL DAILY
Status: CANCELLED | OUTPATIENT
Start: 2025-04-04

## 2025-04-04 RX ORDER — ALLOPURINOL 300 MG/1
300 TABLET ORAL DAILY
Qty: 30 TABLET | Refills: 0 | Status: CANCELLED | OUTPATIENT
Start: 2025-04-05

## 2025-04-04 RX ORDER — TRIAMCINOLONE ACETONIDE 1 MG/G
OINTMENT TOPICAL 2 TIMES DAILY PRN
Status: DISCONTINUED | OUTPATIENT
Start: 2025-04-04 | End: 2025-04-06 | Stop reason: HOSPADM

## 2025-04-04 RX ADMIN — CYTARABINE 5730 MG: 100 INJECTION, SOLUTION INTRATHECAL; INTRAVENOUS; SUBCUTANEOUS at 05:51

## 2025-04-04 RX ADMIN — GABAPENTIN 400 MG: 300 CAPSULE ORAL at 13:13

## 2025-04-04 RX ADMIN — PREDNISOLONE ACETATE 2 DROP: 10 SUSPENSION/ DROPS OPHTHALMIC at 10:32

## 2025-04-04 RX ADMIN — ALBUTEROL SULFATE 2 PUFF: 90 AEROSOL, METERED RESPIRATORY (INHALATION) at 16:03

## 2025-04-04 RX ADMIN — LORATADINE 10 MG: 10 TABLET ORAL at 10:33

## 2025-04-04 RX ADMIN — FAMOTIDINE 20 MG: 20 TABLET, FILM COATED ORAL at 10:33

## 2025-04-04 RX ADMIN — PREDNISOLONE ACETATE 2 DROP: 10 SUSPENSION/ DROPS OPHTHALMIC at 13:13

## 2025-04-04 RX ADMIN — Medication 1000 UNITS: at 10:34

## 2025-04-04 RX ADMIN — GABAPENTIN 400 MG: 300 CAPSULE ORAL at 10:33

## 2025-04-04 RX ADMIN — ONDANSETRON HYDROCHLORIDE 8 MG: 8 TABLET, FILM COATED ORAL at 16:57

## 2025-04-04 RX ADMIN — PREDNISOLONE ACETATE 2 DROP: 10 SUSPENSION/ DROPS OPHTHALMIC at 16:03

## 2025-04-04 RX ADMIN — ALLOPURINOL 300 MG: 300 TABLET ORAL at 10:33

## 2025-04-04 RX ADMIN — ACETAMINOPHEN 650 MG: 325 TABLET, FILM COATED ORAL at 16:06

## 2025-04-04 RX ADMIN — SUCRALFATE ORAL 1 G: 1 SUSPENSION ORAL at 16:03

## 2025-04-04 RX ADMIN — PAROXETINE HYDROCHLORIDE 20 MG: 20 TABLET, FILM COATED ORAL at 10:34

## 2025-04-04 RX ADMIN — Medication 5 ML: at 04:50

## 2025-04-04 RX ADMIN — ACETAMINOPHEN 650 MG: 325 TABLET, FILM COATED ORAL at 06:28

## 2025-04-04 RX ADMIN — SUCRALFATE ORAL 1 G: 1 SUSPENSION ORAL at 10:34

## 2025-04-04 RX ADMIN — DEXAMETHASONE 4 MG: 4 TABLET ORAL at 16:57

## 2025-04-04 RX ADMIN — PREDNISOLONE ACETATE 2 DROP: 10 SUSPENSION/ DROPS OPHTHALMIC at 20:14

## 2025-04-04 RX ADMIN — PANTOPRAZOLE SODIUM 40 MG: 40 TABLET, DELAYED RELEASE ORAL at 10:33

## 2025-04-04 RX ADMIN — DEXAMETHASONE 4 MG: 4 TABLET ORAL at 04:59

## 2025-04-04 RX ADMIN — HEPARIN, PORCINE (PF) 10 UNIT/ML INTRAVENOUS SYRINGE 5 ML: at 17:02

## 2025-04-04 RX ADMIN — HYDROXYZINE HYDROCHLORIDE 50 MG: 25 TABLET, FILM COATED ORAL at 16:06

## 2025-04-04 RX ADMIN — FLUTICASONE FUROATE AND VILANTEROL TRIFENATATE 1 PUFF: 100; 25 POWDER RESPIRATORY (INHALATION) at 10:33

## 2025-04-04 RX ADMIN — FAMOTIDINE 20 MG: 20 TABLET, FILM COATED ORAL at 20:14

## 2025-04-04 RX ADMIN — GABAPENTIN 400 MG: 300 CAPSULE ORAL at 20:14

## 2025-04-04 RX ADMIN — ACYCLOVIR 400 MG: 400 TABLET ORAL at 10:33

## 2025-04-04 RX ADMIN — SUCRALFATE ORAL 1 G: 1 SUSPENSION ORAL at 22:55

## 2025-04-04 RX ADMIN — ACYCLOVIR 400 MG: 400 TABLET ORAL at 20:14

## 2025-04-04 RX ADMIN — PANTOPRAZOLE SODIUM 40 MG: 40 TABLET, DELAYED RELEASE ORAL at 16:03

## 2025-04-04 RX ADMIN — ENOXAPARIN SODIUM 40 MG: 40 INJECTION SUBCUTANEOUS at 20:14

## 2025-04-04 RX ADMIN — CYTARABINE 5730 MG: 100 INJECTION, SOLUTION INTRATHECAL; INTRAVENOUS; SUBCUTANEOUS at 17:46

## 2025-04-04 RX ADMIN — ONDANSETRON HYDROCHLORIDE 8 MG: 8 TABLET, FILM COATED ORAL at 04:59

## 2025-04-04 NOTE — PLAN OF CARE
Goal Outcome Evaluation:      Plan of Care Reviewed With: patient    Overall Patient Progress: no changeOverall Patient Progress: no change    Outcome Evaluation: C1D2 HiDAC    0700-1930: Alejandra is A&Ox4 with VSS on RA. No complaints of SOB or N. Pain noted in bilateral legs with soreness. PRN tylenol given with relief. Atarax given x1. UAL. PICC sluggish in both lumens but with +BR. Purple infusin C1D2 HiDAC. Red lumen dwelling with heparin. Good appetite. Able to make needs known. No replacements needed today. Continue with POC.

## 2025-04-04 NOTE — PROGRESS NOTES
Nursing Focus: Chemotherapy  D: Positive blood return via PICC . Insertion site is clean/dry/intact, dressing intact with no complaints of pain.  Pre infusion assessment documented in Flowsheet (if applicable).    I: Premedications given per order (see electronic medical administration record). Dose #2 of Cytarabine started to infuse over 3 hours. Reviewed pt teaching on chemotherapy side effects.  Pt denies need for further teaching. Chemotherapy double checked per protocol by two chemotherapy competent RN's.   A: Tolerating infusion well. Denies nausea and or pain.   P: Continue to monitor urine output and symptoms of nausea. Screen for symptoms of toxicity.

## 2025-04-04 NOTE — PLAN OF CARE
Goal Outcome Evaluation:      Plan of Care Reviewed With: patient    Overall Patient Progress: no changeOverall Patient Progress: no change    Outcome Evaluation: Pt placed on 2L O2 via NC overnight d/t O2 levels <92. Pt c/o headache in the AM, rated 3/10. PRN Tylenol given x1    Status: Pt admitted to [facility] 2/26/25 for further workup and management after presenting to OSH ED with cytopenias. Pt was found to have AML with NPM1 mutation and started intensive induction with 7+3 on 3/5/25. Course has been c/b neutropenic fevers, epigastric pain, influenza A, AOM, and L TM perforation. Midpoint and restaging BMBx showed no evidence of the disease. Now on consolidation with HiDAC C1D2.  Neuro: A&Ox4.   Vitals: VSS.   Respiratory: Pt placed on 2L O2 via NC overnight d/t O2 levels<92. Now on RA and O2 sats above 92. Denies SOB. Expiratory wheezing noted on auscultation. Infrequent, dry cough present.  Labs: Daily CBC with diff.   GI: Regular diet, tolerating well. Denies n/v. LBM 4/3.   : Void spont w/o difficulty.  Skin: BMBx FLORES. Bruising on abdomen - improving.    IV: PICC - double lumen, purple lumen transfusing chemotherapy.   Pain: C/o headache, rated 3/10. PRN Tylenol given x 1.   Activity: UAL, ambulating halls frequently. Resting comfortably in between cares overnight.   Plan: C1D2 (4/4/25) of HiDAC consolidation. Continue with POC. Notify provider of any changes to pt status.

## 2025-04-04 NOTE — PROGRESS NOTES
[facility]    Progress Note  Hematology / Oncology     Date of Admission:  2/26/2025  Hospital Day #: 37   Date of Service (when I saw the patient): 04/04/2025    Assessment & Plan   Alejandra Villegas is a 59 year old female with a past medical history significant for COPD, migraines, rheumatoid arthritis, aortic stenosis, Afib, and anxiety who presented to an outside hospital with cytopenias and was found on BMBx to have hypercellular marrow with 4% blasts, 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 She was subsequently admitted to [facility] on 2/26/25 for further work-up and management and pathology re-review was most consistent with AML with NPM1 mutation by WHO 2022 (which does not require a specific blast threshold). Following further multidisciplinary discussion, she started intensive induction with 7+3 (D1=3/5/25). Her course has been complicated by neutropenic fevers, epigastric pain, influenza A, AOM, and L TM perforation. Midpoint and restaging BMBx without evidence of disease. Now admitted for consolidation with HiDAC (C1D1=4/3/25).    TODAY:  - D31 7+3 induction. D2 of HiDAC.  - Restaging BMBx completed 3/31. Flow with 4.3% blasts of unusual phenotypes, and morph without dysplasia or blasts. Cytogenetics, molecular pending.   - Alk phos 228, trending up since 4/1. LDH also increasing since 3/27. . Suspect medication induced hepatic injury; Consider liver US if develops abdominal pain, fevers, or elevation of bilirubin or LFTs.   - Repeat CXR today for overnight O2 needs with enlarged cardiac silhouette, potentially projectional. Echo ordered to evaluate for pericardial effusion.  - Continue best supportive cares.    HEME  # AML with NPM1 mutation  Had been seen by PCP Dec 2024 w/ progressive fatigue and nausea where she was found leukopenic WBC  2.4 and neutropenic w/ ANC 0.3. Hgb 12. Was referred to local hematology/oncology clinic  for further evaluation and seen by Dr. Samina Harris. BMBx 2/10/25 done at OSH showed hypercellular marrow w/ trilineage hematopoiesis w/ dyspoiesis with mildly elevated blasts of 4% on morphology. NGS w/ NPM1, IDH2, and NRAS mutations. She was admitted to [facility] 2/26/25 for further workup and management. Slides were re-reviewed by [facility] Hematopathology, who noted hypercellular marrow with 8% blasts by morphology. Despite relatively low blast percentage, findings were felt most consistent with a diagnosis AML with NPM1 mutation by WHO 2022 (which does not require a specific blast threshold). Following interdepartmental discussions and review of the available literature, patient was started on intensive induction with 7+3 (Day 1=3/5/25). Midcycle BMBx 3/19/25: Flow with no increase in myeloid blasts and no abnormal myeloid blast population. Morphology with no morphologic or immunophenotypic evidence of acute myeloid leukemia. D28 BMBx completed 3/31/25. Flow with 4.3% blasts of unusual phenotypes, and morph without dysplasia or blasts. Cytogenetics, molecular pending. Now admitted for consolidation with HiDAC (C1D1=4/3/2025).  - PICC placed 3/5/2025, plan to discontinue on discharge.   - Baseline cardiopulmonary studies:  - Echo w/ EF 60-65%, mild known aortic stenosis.   - EKG (2/27) with NSR, QTc 412  - CXR (2/26) with mildly increased streaky bibasilar opacities, atelectasis or edema. No consolidation.   - Baseline viral serologies: CMV IgG+, EBV IgG+, HepB sAg-, HepB cAb-, HepB sAb-, HSV1+/2+, HIV-  - HLA Typing sent on admission. Message sent to notify BMT team x2/27 for IP consult. Have not yet seen patient, message sent x4/2. They plan to schedule with her in the outpatient setting.    Treatment plan: HiDAC (C1D1=4/3/2025)   - Cytarabine 3 g/m  IV q12h D1-3   - Neulasta 6 mg D5 - 4/7 at [facility]  Supportive medications: Zofran 8 mg q12h, Dexamethasone 4 mg q12h D1-3, Prednisolone eye  drops QID D1-5      # Pancytopenia  Secondary to underlying hematologic malignancy and exacerbated by recent chemotherapy.  - Follow daily CBC with differential  - Transfuse to keep Hgb >7, plt >10K  - Blood consent obtained 2/26/25 on admission and placed in patient's paper chart.      ID  # Neutropenic fever, resolved  # Influenza A, resolved  Episode #1:  2/25: On admission, patient noted subjective fever with chills and rhinitis beginning 2/25 PM prior to admission. No other localizing s/sx of infection. She was afebrile on admission but spiked a low-grade fever to 100.7 on 2/26 PM. Blood and urine cultures negative, CXR with streaky opacities but no burt consolidation. Work-up positive for influenza A, and she completed a course of Tamiflu 75 mg BID x5 days (2/26-3/3). She also received a short empiric course of IV cefepime (2/26-3/1) given concurrent neutropenia.  Episode #2:  3/13: Febrile to 100.4 overnight x3/13. No localizing/new symptoms. OVSS. Patient was wearing heated jacket that she attributed to temperature, no documentation noted of notifying cross cover MD. No further ID workup (BCx, UA, UC, or CXR) completed and no antibiotic changes made. Given borderline temperature, decision made to continue monitoring and no further work-up was undertaken.  Episode #3:  3/19: Tmax 100.6 overnight. Asymptomatic, although notes sweating upon waking the following morning. CXR with scattered patchy densities, especially in the lung bases. Respiratory panel negative. UA with protein and blood but otherwise bland, UCx negative. BCx NGTD. A CT C/A/P was done (for other reasons, as below) and notable for findings suggestive of esophagitis as well as possible bronchitis/small airways disease. She was treated with a 7-day course of IV cefepime (3/18-3/25) and fever did not recur.  Episode #4:  4/1: Tmax 101.4 overnight. Nonproductive cough, otherwise no new focal infectious symptoms. CXR with diffuse hazy opacities, UA bland, UCx NGTD.  BCx remain NGTD. Cryptococcus, histoplasma, and fungitell negative. Discontinue Cefepime (4/1-4/3).   - monitor for recurrent fevers    # ID prophylaxis  - Acyclovir 400 mg BID  - Levaquin 500 mg daily - when ANC <1.0 - plan to resume on discharge  - Posaconazole 300 mg daily - HELD for consolidation - resume on discharge given active smoker   - Vori w/ $1.60 copay, posa requiring PA also w/ $1.60 copay. Rotated from deny to vori (3/13-3/17) w/ completion of chemotherapy, then transitioned to posa x3/18 given visual hallucinations x3/17     GI  # GERD, improved  # Epigastric pain, resolved  Reported recent increase in symptoms in the days leading up to admission and initiated famotidine. Persistent/progressive symptoms throughout admission. Cardiac work-up reassuring, lipase negative. Increased H2B to BID (3/9); given persistent symptoms, PPI started (3/14). Increased to BID (3/19). CT C/A/P with evidence of esophagitis (thought due to reflux/recent chemotherapy) and small hiatal hernia. Symptoms have improved on maximal medical therapy as below.  - Continue famotidine 20 mg BID  - Continue Protonix 40 mg BID  - GI cocktail PRN  - TUMS PRN  - GI consulted, EGD deferred for now as risks thought to outweigh the benefits in the setting of neutropenia; can reassess upon count recovery  - Continue to encourage lifestyle modifications: avoid straws, carbonated beverages, GERD precautions    # Risk of malnutrition  Secondary to esophagitis/GERD/mucositis picture as above, patient intake has decreased to small servings, mostly eating soup. On regular adult diet with thin liquids per SLP. Recommended trial of high caloric liquids, patient tried Strawberry Ensure and plans to continue to order with meals.  - RD following; appreciate recs     # Nausea/vomiting, improving  Patient noted ongoing nausea w/ occasional vomiting starting Dec 2024. Has been managed with PRN Zofran.  Endorsed nausea on admission, now resolved.   - PRN  Zofran and compazine  - Could consider trial of PRN Zyprexa if 3rd agent is desired/required    # Elevated alkaline phosphatase  Alk phos has been trending up since 4/1. LDH also increasing since 3/27.  x4/3. Suspect medication induced hepatic injury. Most recent CT chest/abd on 3/19 without intrahepatic biliary dilatation or focal hepatic mass, s/p cholecystectomy.  - Daily CMP   - Consider liver US if develops abdominal pain, fevers, or elevation of bilirubin or LFTs.      PULM  # COPD  Longstanding history of COPD. On admission patient reports symptoms are manageable with no recent exacerbations. Most recent PFTs (2018) were normal.  - Continue PTA Breo Ellipta inhaler and PRN DuoNebs      CV  # Essential hypertension  - PTA lisinopril 10 mg daily HELD 3/11 w/ soft BPs (90s-100s/50s-60s), resume pending daily trends    # Infrarenal abdominal aortic aneurysm  Noted incidentally on CT C/A/P (3/19/25), measuring 3.4 cm in diameter.  - Attention on follow-up imaging    RENAL/FEN  # Stage 2 CKD  Baseline Cr ~ 0.7. On admission Cr 0.77.  - Continue to trend on daily labs and encourage PO hydration     NEURO  # Chronic migraine w/o aura  # Chronic headaches  Present since childhood. Reportedly triggered by neck manipulation/movement. Reportedly well-managed with regimen below. Headaches occurring throughout admission with daily APAP use, also chronic and managed with APAP PRN at home. Patient notes that her headaches throughout admission have been consistent with her typical daily headaches with no reported changes in character, quality, location, severity, or frequency. She denies new associated neurological changes. Considered LP to exclude CNS leukemia, but given this reassuring history and absence of other indications for LP (no hyperleukocytosis, no monocytic phenotype, no FLT3 mutation, etc), this was ultimately deferred.  - APAP PRN  - Continue PTA sumatriptan and cyclobenzaprine PRN  - Could reconsider  need for LP if headache worsens/changes in character or quality in the future    MISC  # Maculopapular rash  Noted on 3/25. Pruritic, localized to the neck, right shoulder, and flexural area behind the right knee. Clinically, affected areas consist of coalescing erythematous macules with scattered papules. No obvious vesicles or bullae. DDx includes contact dermatitis (favored, possibly due to CHG wipes) versus atopic dermatitis (flexural areas raise suspicion for this) versus drug eruption versus other. Improved as of 3/26 with triamcinolone ointment.  - Continue triamcinolone ointment 0.1% BID to the affected areas  - Okay to defer CHG wipes given concern for contact dermatitis  - Monitor clinically    # BROOKS  # MDD  # At risk for adjustment disorder  Patient reports good control of anxiety/depressive symptoms prior to admission. Did note feeling overwhelmed by new diagnosis. Offered health psychology or cordelia, which she declined.  - Readdress health psych consult as indicated  - Continue PTA paroxetine   - Atarax as needed     # Tobacco use disorder  Has smoked since age 16, 1.5 ppd (roughly 65 pack years). Attempting to cut back as recently as 01/2025 to 0.5 ppd. We discussed the risks of smoking during induction chemotherapy including increased risk for infection in setting of severe neutropenia that could further complicate course, placing her at risk for severe complications that could be life-threatening or even fatal.  - Encourage smoking cessation; patient continues to smoke at this time, despite understanding/acknowledgement of the risks.  - Trialed nicotine patch, then self-discontinued after reporting that she felt the nicotine patch precipitated an anxiety attack/episode of chest pain on 3/9. Offered a lower dose versus alternative form of NRT, which patient declined.      # Degenerative disc disease, L5-S1   PTA gabapentin 400 mg TID prn. Patient reported increased L sciatic pain, had taken  gabapentin TID on 3/29 with improvement in symptoms. Will schedule gabapentin 400 mg TID per patient request. Improving as of 3/31.  - Continue to monitor symptoms    Chronic Problems:  # Vitamin D deficiency - Continue PTA vit D supplement  # Seasonal allergies - Claritin 10 mg daily  # Rheumatoid arthritis - Patient reported trying treatment though was unable to tolerate well. Managed with Tylenol PRN. Most recent RF >650 (2/10/25). JI negative.    # Prediabetes - Last A1c 6.0 x12/9/24. Has been managing with lifestyle modifications.   # Mixed hyperlipidemia - Lipid panel has remained at goal, 1/14/25 panel w/ cholesterol 163, , LDL 92, HDL 45.   - Held PTA atorvastatin on admission due to chemotherapy interactions. Consider rotation to rosuvastatin for better drug-drug interaction profile.   # H/o aortic stenosis - Patient noted on admission longstanding history of aortic stenosis. Pre-chemo echo w/ EF 60-65% and mild aortic stenosis and insufficiency. No previous echo to compare.     Resolved Hospital Problems:  # Mild hyponatremia, resolved - New mild hyponatremia noted 3/13 with Na 135 ? 130. Asymptomatic. Now resolved.  # Non-radiating chest pain, resolved - for details see note from 3/21 and prior   # Urinary frequency + Dysuria, resolved - On admission, patient noted longstanding history of urinary frequency though new mild dysuria. UA (2/26) negative, UC with no growth. Symptoms have since resolved.  # R AOM with purulent effusion and Small L TM perforation, resolved - ENT consulted, appreciate recs. Completed 7-day course Levaquin 750mg daily and 5 day course of floxin drops to left ear for perforation     Clinically Significant Risk Factors               # Hypoalbuminemia: Lowest albumin = 3.2 g/dL at 3/24/2025  4:59 AM, will monitor as appropriate     # Hypertension: Noted on problem list             # Moderate Malnutrition: based on nutrition assessment and treatment provided per dietitian's  recommendations.      # Financial/Environmental Concerns: none  # Asthma: noted on problem list       Fluids/Electrolytes/Nutrition   - IVF bolus PRN   - PRN lyte replacement per standing protocol  - Regular diet as tolerated      Lines: PICC     PPX  VTE: Lovenox held 3/13 for TCP, resumed 3/28   GI: Pepcid, Protonix  Bowels: prn senna and miralax  Activity: as tolerated    Code Status: DNR/DNI    Disposition: Inpatient admission to [facility]e Malignancy service for treatment of AML; discharge to home pending tolerance of consolidation chemotherapy.  Follow-up: Will follow with Dr. Harris as primary oncologist on 4/11 and go to [facility] for twice weekly labs/transfusions (M/Th) starting 4/7. Neulasta will also be given 4/7 at [facility].    Medically Ready for Discharge: Anticipated in 2-4 Days      Staffed with Dr. Byrd.    I spent 75 minutes in the care of this patient today, which included time necessary for review of interval events, obtaining history and physical exam, ordering medication(s)/test(s) as medically indicated, discussion with interdisciplinary/consult team(s), care coordination, and documentation time.     Andrey Crowell PA-C  Hematology/Oncology  Pager [phone]    Interval History   No acute overnight events. Afebrile and vitally stable. Nursing notes reviewed.     Alejandra is feeling well today, denies concerns or complaints about her first doses of HiDAC. Reviewed follow up schedule with patient to which she states understanding. Notes that her appetite is improving, eating mostly pizza and chicken noodle soup. Sleeping well, although waking up sweating intermittently. No recurrent fevers or infectious symptoms. Denies chest pain, shortness of breath, nausea/vomiting/diarrhea, abdominal pain, fevers, constipation, peripheral edema, vision changes. Discussed cough that is recurrent and nonproductive. All concerns were addressed and questions were answered.    A comprehensive review  of symptoms was performed and was negative except as detailed in the interval history above.    Physical Exam   Vital Signs with Ranges  Temp:  [97.3  F (36.3  C)-99.8  F (37.7  C)] 97.3  F (36.3  C)  Pulse:  [76-81] 76  Resp:  [18-20] 18  BP: (109-143)/(57-83) 109/57  SpO2:  [82 %-94 %] 91 %    I/O last 3 completed shifts:  In: 2277 [P.O.:2277]  Out: 3075 [Urine:3075]    Vitals:    04/01/25 0948 04/02/25 1017 04/03/25 1019   Weight: 80.7 kg (177 lb 14.4 oz) 81.6 kg (180 lb) 81.2 kg (179 lb 1.6 oz)     Constitutional: Pleasant and cooperative female, in NAD. Alert, interactive, non-toxic. Smiling and conversational.  HEENT: NC/AT, sclera clear, conjunctiva normal, MMM without lesion, thrush, or abscess; poor dentition.  Respiratory: No increased work of breathing. Breath sounds mildly diminished to the lung bases bilaterally. Slight inspiratory wheeze in b/l lung fields.   Cardiovascular: RRR, systolic murmur. No peripheral edema.   GI: Normal bowel sounds. Abdomen with central obesity, is soft, non-distended and non-tender to palpation.  Skin: Warm, dry, well-perfused.   Musculoskeletal: Diminished muscle bulk, no gross deformities.  Neurologic: A&O. Answers questions appropriately, speech normal. Normal gait. Moves all extremities spontaneously.  Psychiatric: Normal mood and affect.  Vascular access:  PICC on RUE CDI, non-tender, no surrounding erythema.    Medications   Current Facility-Administered Medications   Medication Dose Route Frequency Provider Last Rate Last Admin     Current Facility-Administered Medications   Medication Dose Route Frequency Provider Last Rate Last Admin    acyclovir (ZOVIRAX) tablet 400 mg  400 mg Oral BID Chelsie Murphy PA-C   400 mg at 04/03/25 1945    allopurinol (ZYLOPRIM) tablet 300 mg  300 mg Oral Daily Britton Jaimes MD   300 mg at 04/03/25 1147    cytarabine (PF) (CYTOSAR) 5,730 mg in D5W 332.3 mL infusion  3 g/m2 (Treatment Plan Recorded) Intravenous Q12H  Britton Jaimes .8 mL/hr at 04/04/25 0551 5,730 mg at 04/04/25 0551    dexAMETHasone (DECADRON) tablet 4 mg  4 mg Oral Q12H PremBritton granado MD   4 mg at 04/04/25 0459    enoxaparin ANTICOAGULANT (LOVENOX) injection 40 mg  40 mg Subcutaneous Q24H Nery Crowell PA-C   40 mg at 04/03/25 1945    famotidine (PEPCID) tablet 20 mg  20 mg Oral BID Zully Garcia PA-C   20 mg at 04/03/25 1945    [START ON 4/7/2025] filgrastim-aafi (NIVESTYM) injection 480 mcg  480 mcg Subcutaneous Daily at 8 pm Britton Jaimes MD        fluticasone-vilanterol (BREO ELLIPTA) 100-25 MCG/ACT inhaler 1 puff  1 puff Inhalation Daily Chelsie Murphy PA-C   1 puff at 04/03/25 0923    gabapentin (NEURONTIN) capsule 400 mg  400 mg Oral TID Zully Garcia PA-C   400 mg at 04/03/25 1945    heparin lock flush 10 unit/mL injection 5-20 mL  5-20 mL Intracatheter Q24H Chelsie Murphy PA-C   5 mL at 04/04/25 0450    [Held by provider] lisinopril (ZESTRIL) tablet 10 mg  10 mg Oral Daily Jyoti Tenorio PA-C   10 mg at 03/10/25 1039    loratadine (CLARITIN) tablet 10 mg  10 mg Oral Daily Chelsie Murphy PA-C   10 mg at 04/03/25 0925    ondansetron (ZOFRAN) tablet 8 mg  8 mg Oral Q12H PremBritton granado MD   8 mg at 04/04/25 0459    pantoprazole (PROTONIX) EC tablet 40 mg  40 mg Oral BID AC Nery Crowell PA-C   40 mg at 04/03/25 1613    PARoxetine (PAXIL) tablet 20 mg  20 mg Oral QAM Chelsie Murphy PA-C   20 mg at 04/03/25 0925    [Held by provider] posaconazole (NOXAFIL) DR tablet TBEC 300 mg  300 mg Oral Nery Dubose PA-C   300 mg at 04/01/25 1026    prednisoLONE acetate (PRED FORTE) 1 % ophthalmic susp 2 drop  2 drop Both Eyes 4x Daily Britton Jaimes MD   2 drop at 04/03/25 1945    sodium chloride (PF) 0.9% PF flush 10-40 mL  10-40 mL Intracatheter Q8H Chelsie Murphy PA-C   10 mL at 04/04/25 0450    sucralfate (CARAFATE) suspension 1 g  1 g  Oral 4x Daily AC & HS Aman Cervantes MD   1 g at 04/03/25 2100    triamcinolone (KENALOG) 0.1 % ointment   Topical BID Chelsie Davila PA-C   Given at 03/28/25 1035    Vitamin D3 (CHOLECALCIFEROL) tablet 1,000 Units  1,000 Units Oral Daily Chelsie Murphy PA-C   1,000 Units at 04/03/25 0925     Antiinfectives  Anti-infectives (From now, onward)      Start     Dose/Rate Route Frequency Ordered Stop    03/18/25 0800  [Held by provider]  posaconazole (NOXAFIL) DR tablet TBEC 300 mg        (On hold since Tue 4/1/2025 at 1349 until manually unheld; held by Nery Crowell PA-CHold Reason: Other)    300 mg Oral EVERY MORNING 03/17/25 1242      02/26/25 2000  acyclovir (ZOVIRAX) tablet 400 mg         400 mg Oral 2 TIMES DAILY 02/26/25 1307            Data   CBC   Recent Labs   Lab 04/04/25  0452 04/03/25  0437 04/02/25  0502 04/01/25  0444   WBC 8.1 9.6 8.7 7.5   RBC 2.67* 2.37* 2.29* 2.41*   HGB 8.2* 7.2* 7.2* 7.4*   HCT 25.3* 22.5* 22.3* 22.3*   MCV 95 95 97 93   MCH 30.7 30.4 31.4 30.7   MCHC 32.4 32.0 32.3 33.2   RDW 16.7* 16.6* 16.5* 16.1*   PLT 1,699* 1,471* 1,501* 1,538*  581*     CMP   Recent Labs   Lab 04/04/25  0452 04/03/25  0437 04/02/25  0502 04/01/25  0444    138 141 138   POTASSIUM 4.5 3.7 3.5 3.6   CHLORIDE 99 100 102 99   CO2 28 26 27 26   ANIONGAP 12 12 12 13   * 118* 137* 114*   BUN 12.5 9.5 9.5 7.9   CR 0.57 0.61 0.68 0.68   GFRESTIMATED >90 >90 >90 >90   TOBIN 9.4 8.8 8.9 8.8   MAG 2.8* 2.3 2.2 2.1   PHOS 4.3 3.3 3.7 3.4   PROTTOTAL 7.2 6.8 6.5 6.6   ALBUMIN 3.7 3.4* 3.3* 3.5   BILITOTAL 0.3 0.2 0.2 0.2   ALKPHOS 228* 221* 180* 158*   AST 25 39 36 25   ALT 43 49 37 18     LFTs   Recent Labs   Lab 04/04/25  0452   PROTTOTAL 7.2   ALBUMIN 3.7   BILITOTAL 0.3   ALKPHOS 228*   AST 25   ALT 43     Coagulation Studies   Recent Labs   Lab 04/04/25  0452   INR 1.11   PTT 34

## 2025-04-05 ENCOUNTER — APPOINTMENT (OUTPATIENT)
Dept: CARDIOLOGY | Facility: CLINIC | Age: 60
End: 2025-04-05
Payer: MEDICARE

## 2025-04-05 LAB
ALBUMIN SERPL BCG-MCNC: 3.7 G/DL (ref 3.5–5.2)
ALP SERPL-CCNC: 192 U/L (ref 40–150)
ALT SERPL W P-5'-P-CCNC: 34 U/L (ref 0–50)
ANION GAP SERPL CALCULATED.3IONS-SCNC: 11 MMOL/L (ref 7–15)
AST SERPL W P-5'-P-CCNC: 19 U/L (ref 0–45)
BASOPHILS # BLD AUTO: 0 10E3/UL (ref 0–0.2)
BASOPHILS NFR BLD AUTO: 0 %
BILIRUB SERPL-MCNC: 0.2 MG/DL
BLD PROD TYP BPU: NORMAL
BLOOD COMPONENT TYPE: NORMAL
BUN SERPL-MCNC: 21.3 MG/DL (ref 6–20)
CALCIUM SERPL-MCNC: 9.5 MG/DL (ref 8.8–10.4)
CHLORIDE SERPL-SCNC: 101 MMOL/L (ref 98–107)
CODING SYSTEM: NORMAL
CREAT SERPL-MCNC: 0.57 MG/DL (ref 0.51–0.95)
CROSSMATCH: NORMAL
EGFRCR SERPLBLD CKD-EPI 2021: >90 ML/MIN/1.73M2
EOSINOPHIL # BLD AUTO: 0 10E3/UL (ref 0–0.7)
EOSINOPHIL NFR BLD AUTO: 0 %
ERYTHROCYTE [DISTWIDTH] IN BLOOD BY AUTOMATED COUNT: 16.2 % (ref 10–15)
GLUCOSE SERPL-MCNC: 134 MG/DL (ref 70–99)
HCO3 SERPL-SCNC: 28 MMOL/L (ref 22–29)
HCT VFR BLD AUTO: 24.9 % (ref 35–47)
HGB BLD-MCNC: 7.9 G/DL (ref 11.7–15.7)
IMM GRANULOCYTES # BLD: 0.1 10E3/UL
IMM GRANULOCYTES NFR BLD: 1 %
ISSUE DATE AND TIME: NORMAL
LDH SERPL L TO P-CCNC: 327 U/L (ref 0–250)
LVEF ECHO: NORMAL
LYMPHOCYTES # BLD AUTO: 0.3 10E3/UL (ref 0.8–5.3)
LYMPHOCYTES NFR BLD AUTO: 5 %
MAGNESIUM SERPL-MCNC: 2.6 MG/DL (ref 1.7–2.3)
MCH RBC QN AUTO: 31.1 PG (ref 26.5–33)
MCHC RBC AUTO-ENTMCNC: 31.7 G/DL (ref 31.5–36.5)
MCV RBC AUTO: 98 FL (ref 78–100)
MONOCYTES # BLD AUTO: 0.4 10E3/UL (ref 0–1.3)
MONOCYTES NFR BLD AUTO: 7 %
NEUTROPHILS # BLD AUTO: 4.9 10E3/UL (ref 1.6–8.3)
NEUTROPHILS NFR BLD AUTO: 87 %
NRBC # BLD AUTO: 0 10E3/UL
NRBC BLD AUTO-RTO: 0 /100
PHOSPHATE SERPL-MCNC: 4.4 MG/DL (ref 2.5–4.5)
PLATELET # BLD AUTO: 1489 10E3/UL (ref 150–450)
POTASSIUM SERPL-SCNC: 4.7 MMOL/L (ref 3.4–5.3)
PROT SERPL-MCNC: 7.1 G/DL (ref 6.4–8.3)
RBC # BLD AUTO: 2.54 10E6/UL (ref 3.8–5.2)
SODIUM SERPL-SCNC: 140 MMOL/L (ref 135–145)
UNIT ABO/RH: NORMAL
UNIT NUMBER: NORMAL
UNIT STATUS: NORMAL
UNIT TYPE ISBT: 6200
URATE SERPL-MCNC: 4 MG/DL (ref 2.4–5.7)
WBC # BLD AUTO: 5.6 10E3/UL (ref 4–11)

## 2025-04-05 PROCEDURE — 3E04305 INTRODUCTION OF OTHER ANTINEOPLASTIC INTO CENTRAL VEIN, PERCUTANEOUS APPROACH: ICD-10-PCS | Performed by: INTERNAL MEDICINE

## 2025-04-05 PROCEDURE — 250N000011 HC RX IP 250 OP 636: Performed by: STUDENT IN AN ORGANIZED HEALTH CARE EDUCATION/TRAINING PROGRAM

## 2025-04-05 PROCEDURE — 250N000012 HC RX MED GY IP 250 OP 636 PS 637: Performed by: STUDENT IN AN ORGANIZED HEALTH CARE EDUCATION/TRAINING PROGRAM

## 2025-04-05 PROCEDURE — 250N000013 HC RX MED GY IP 250 OP 250 PS 637

## 2025-04-05 PROCEDURE — 84132 ASSAY OF SERUM POTASSIUM: CPT

## 2025-04-05 PROCEDURE — 84155 ASSAY OF PROTEIN SERUM: CPT

## 2025-04-05 PROCEDURE — 93306 TTE W/DOPPLER COMPLETE: CPT

## 2025-04-05 PROCEDURE — 258N000003 HC RX IP 258 OP 636: Performed by: STUDENT IN AN ORGANIZED HEALTH CARE EDUCATION/TRAINING PROGRAM

## 2025-04-05 PROCEDURE — 120N000002 HC R&B MED SURG/OB UMMC

## 2025-04-05 PROCEDURE — 83735 ASSAY OF MAGNESIUM: CPT

## 2025-04-05 PROCEDURE — 99233 SBSQ HOSP IP/OBS HIGH 50: CPT | Mod: FS | Performed by: PHYSICIAN ASSISTANT

## 2025-04-05 PROCEDURE — 250N000013 HC RX MED GY IP 250 OP 250 PS 637: Performed by: STUDENT IN AN ORGANIZED HEALTH CARE EDUCATION/TRAINING PROGRAM

## 2025-04-05 PROCEDURE — 250N000013 HC RX MED GY IP 250 OP 250 PS 637: Performed by: HOSPITALIST

## 2025-04-05 PROCEDURE — P9016 RBC LEUKOCYTES REDUCED: HCPCS | Performed by: PHYSICIAN ASSISTANT

## 2025-04-05 PROCEDURE — 250N000011 HC RX IP 250 OP 636

## 2025-04-05 PROCEDURE — 93306 TTE W/DOPPLER COMPLETE: CPT | Mod: 26 | Performed by: INTERNAL MEDICINE

## 2025-04-05 PROCEDURE — 83615 LACTATE (LD) (LDH) ENZYME: CPT

## 2025-04-05 PROCEDURE — 84550 ASSAY OF BLOOD/URIC ACID: CPT | Performed by: PHYSICIAN ASSISTANT

## 2025-04-05 PROCEDURE — 99418 PROLNG IP/OBS E/M EA 15 MIN: CPT | Performed by: PHYSICIAN ASSISTANT

## 2025-04-05 PROCEDURE — 84100 ASSAY OF PHOSPHORUS: CPT | Performed by: PHYSICIAN ASSISTANT

## 2025-04-05 PROCEDURE — 85004 AUTOMATED DIFF WBC COUNT: CPT

## 2025-04-05 RX ORDER — FAMOTIDINE 20 MG/1
20 TABLET, FILM COATED ORAL 2 TIMES DAILY
Qty: 60 TABLET | Refills: 3 | Status: SHIPPED | OUTPATIENT
Start: 2025-04-05

## 2025-04-05 RX ORDER — GABAPENTIN 400 MG/1
400 CAPSULE ORAL 3 TIMES DAILY
Qty: 90 CAPSULE | Refills: 0 | Status: SHIPPED | OUTPATIENT
Start: 2025-04-05

## 2025-04-05 RX ORDER — PANTOPRAZOLE SODIUM 40 MG/1
40 TABLET, DELAYED RELEASE ORAL
Qty: 60 TABLET | Refills: 3 | Status: SHIPPED | OUTPATIENT
Start: 2025-04-05

## 2025-04-05 RX ORDER — PROCHLORPERAZINE MALEATE 5 MG/1
5 TABLET ORAL EVERY 6 HOURS PRN
Qty: 30 TABLET | Refills: 3 | Status: SHIPPED | OUTPATIENT
Start: 2025-04-05

## 2025-04-05 RX ORDER — ROSUVASTATIN CALCIUM 20 MG/1
20 TABLET, COATED ORAL DAILY
Qty: 30 TABLET | Refills: 3 | Status: SHIPPED | OUTPATIENT
Start: 2025-04-05

## 2025-04-05 RX ORDER — ACYCLOVIR 400 MG/1
400 TABLET ORAL 2 TIMES DAILY
Qty: 60 TABLET | Refills: 0 | Status: SHIPPED | OUTPATIENT
Start: 2025-04-05

## 2025-04-05 RX ORDER — FAMOTIDINE 20 MG
1000 TABLET ORAL DAILY
Qty: 90 CAPSULE | Refills: 1 | Status: SHIPPED | OUTPATIENT
Start: 2025-04-05

## 2025-04-05 RX ORDER — ALBUTEROL SULFATE 90 UG/1
1-2 INHALANT RESPIRATORY (INHALATION) EVERY 4 HOURS PRN
Qty: 18 G | Refills: 4 | Status: SHIPPED | OUTPATIENT
Start: 2025-04-05

## 2025-04-05 RX ORDER — ONDANSETRON 4 MG/1
4-8 TABLET, ORALLY DISINTEGRATING ORAL EVERY 8 HOURS PRN
Qty: 45 TABLET | Refills: 3 | Status: SHIPPED | OUTPATIENT
Start: 2025-04-05

## 2025-04-05 RX ORDER — HYDROXYZINE HYDROCHLORIDE 25 MG/1
25-50 TABLET, FILM COATED ORAL EVERY 6 HOURS PRN
Qty: 90 TABLET | Refills: 3 | Status: SHIPPED | OUTPATIENT
Start: 2025-04-05

## 2025-04-05 RX ORDER — CALCIUM CARBONATE 500 MG/1
1 TABLET, CHEWABLE ORAL 3 TIMES DAILY PRN
Qty: 90 TABLET | Refills: 0 | Status: SHIPPED | OUTPATIENT
Start: 2025-04-05

## 2025-04-05 RX ORDER — POSACONAZOLE 100 MG/1
300 TABLET, DELAYED RELEASE ORAL EVERY MORNING
Qty: 90 TABLET | Refills: 3 | Status: SHIPPED | OUTPATIENT
Start: 2025-04-05

## 2025-04-05 RX ORDER — PREDNISOLONE ACETATE 10 MG/ML
2 SUSPENSION/ DROPS OPHTHALMIC 4 TIMES DAILY
Qty: 5 ML | Refills: 0 | Status: SHIPPED | OUTPATIENT
Start: 2025-04-05

## 2025-04-05 RX ORDER — LEVOFLOXACIN 250 MG/1
250 TABLET, FILM COATED ORAL DAILY
Qty: 30 TABLET | Refills: 3 | Status: SHIPPED | OUTPATIENT
Start: 2025-04-05

## 2025-04-05 RX ORDER — LORATADINE 10 MG/1
10 TABLET ORAL DAILY
Qty: 30 TABLET | Refills: 0 | Status: SHIPPED | OUTPATIENT
Start: 2025-04-05

## 2025-04-05 RX ORDER — SUCRALFATE ORAL 1 G/10ML
1 SUSPENSION ORAL
Qty: 414 ML | Refills: 0 | Status: SHIPPED | OUTPATIENT
Start: 2025-04-05

## 2025-04-05 RX ADMIN — DEXAMETHASONE 4 MG: 4 TABLET ORAL at 05:04

## 2025-04-05 RX ADMIN — SUCRALFATE ORAL 1 G: 1 SUSPENSION ORAL at 09:04

## 2025-04-05 RX ADMIN — FLUTICASONE FUROATE AND VILANTEROL TRIFENATATE 1 PUFF: 100; 25 POWDER RESPIRATORY (INHALATION) at 09:02

## 2025-04-05 RX ADMIN — PANTOPRAZOLE SODIUM 40 MG: 40 TABLET, DELAYED RELEASE ORAL at 16:46

## 2025-04-05 RX ADMIN — ACYCLOVIR 400 MG: 400 TABLET ORAL at 19:48

## 2025-04-05 RX ADMIN — ONDANSETRON HYDROCHLORIDE 8 MG: 8 TABLET, FILM COATED ORAL at 16:46

## 2025-04-05 RX ADMIN — SUCRALFATE ORAL 1 G: 1 SUSPENSION ORAL at 16:47

## 2025-04-05 RX ADMIN — FAMOTIDINE 20 MG: 20 TABLET, FILM COATED ORAL at 09:03

## 2025-04-05 RX ADMIN — PREDNISOLONE ACETATE 2 DROP: 10 SUSPENSION/ DROPS OPHTHALMIC at 16:46

## 2025-04-05 RX ADMIN — ENOXAPARIN SODIUM 40 MG: 40 INJECTION SUBCUTANEOUS at 19:49

## 2025-04-05 RX ADMIN — Medication 1000 UNITS: at 09:03

## 2025-04-05 RX ADMIN — CYTARABINE 5730 MG: 100 INJECTION, SOLUTION INTRATHECAL; INTRAVENOUS; SUBCUTANEOUS at 05:32

## 2025-04-05 RX ADMIN — HEPARIN, PORCINE (PF) 10 UNIT/ML INTRAVENOUS SYRINGE 5 ML: at 14:12

## 2025-04-05 RX ADMIN — ACYCLOVIR 400 MG: 400 TABLET ORAL at 09:04

## 2025-04-05 RX ADMIN — PREDNISOLONE ACETATE 2 DROP: 10 SUSPENSION/ DROPS OPHTHALMIC at 19:49

## 2025-04-05 RX ADMIN — PREDNISOLONE ACETATE 2 DROP: 10 SUSPENSION/ DROPS OPHTHALMIC at 12:14

## 2025-04-05 RX ADMIN — GABAPENTIN 400 MG: 300 CAPSULE ORAL at 09:03

## 2025-04-05 RX ADMIN — ONDANSETRON HYDROCHLORIDE 8 MG: 8 TABLET, FILM COATED ORAL at 05:04

## 2025-04-05 RX ADMIN — ACETAMINOPHEN 650 MG: 325 TABLET, FILM COATED ORAL at 14:45

## 2025-04-05 RX ADMIN — HEPARIN, PORCINE (PF) 10 UNIT/ML INTRAVENOUS SYRINGE 5 ML: at 22:01

## 2025-04-05 RX ADMIN — PAROXETINE HYDROCHLORIDE 20 MG: 20 TABLET, FILM COATED ORAL at 09:03

## 2025-04-05 RX ADMIN — ALLOPURINOL 300 MG: 300 TABLET ORAL at 09:03

## 2025-04-05 RX ADMIN — HEPARIN, PORCINE (PF) 10 UNIT/ML INTRAVENOUS SYRINGE 5 ML: at 14:44

## 2025-04-05 RX ADMIN — CYTARABINE 5730 MG: 100 INJECTION, SOLUTION INTRATHECAL; INTRAVENOUS; SUBCUTANEOUS at 17:48

## 2025-04-05 RX ADMIN — LORATADINE 10 MG: 10 TABLET ORAL at 09:03

## 2025-04-05 RX ADMIN — PANTOPRAZOLE SODIUM 40 MG: 40 TABLET, DELAYED RELEASE ORAL at 09:03

## 2025-04-05 RX ADMIN — HYDROXYZINE HYDROCHLORIDE 50 MG: 25 TABLET, FILM COATED ORAL at 09:02

## 2025-04-05 RX ADMIN — SUCRALFATE ORAL 1 G: 1 SUSPENSION ORAL at 12:14

## 2025-04-05 RX ADMIN — GABAPENTIN 400 MG: 300 CAPSULE ORAL at 19:48

## 2025-04-05 RX ADMIN — DEXAMETHASONE 4 MG: 4 TABLET ORAL at 16:46

## 2025-04-05 RX ADMIN — FAMOTIDINE 20 MG: 20 TABLET, FILM COATED ORAL at 19:48

## 2025-04-05 RX ADMIN — ALBUTEROL SULFATE 2 PUFF: 90 AEROSOL, METERED RESPIRATORY (INHALATION) at 16:47

## 2025-04-05 RX ADMIN — HEPARIN, PORCINE (PF) 10 UNIT/ML INTRAVENOUS SYRINGE 10 ML: at 09:02

## 2025-04-05 RX ADMIN — GABAPENTIN 400 MG: 300 CAPSULE ORAL at 14:43

## 2025-04-05 RX ADMIN — SUCRALFATE ORAL 1 G: 1 SUSPENSION ORAL at 19:52

## 2025-04-05 RX ADMIN — PREDNISOLONE ACETATE 2 DROP: 10 SUSPENSION/ DROPS OPHTHALMIC at 09:02

## 2025-04-05 NOTE — PROGRESS NOTES
[facility]    Progress Note  Hematology / Oncology     Date of Admission:  2/26/2025  Hospital Day #: 38   Date of Service (when I saw the patient): 04/05/2025    Assessment & Plan   Alejandra Villegas is a 59 year old female with a past medical history significant for COPD, migraines, rheumatoid arthritis, aortic stenosis, Afib, and anxiety who presented to an outside hospital with cytopenias and was found on BMBx to have hypercellular marrow with 4% blasts, 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 She was subsequently admitted to [facility] on 2/26/25 for further work-up and management and pathology re-review was most consistent with AML with NPM1 mutation by WHO 2022 (which does not require a specific blast threshold). Following further multidisciplinary discussion, she started intensive induction with 7+3 (D1=3/5/25). Her course has been complicated by neutropenic fevers, epigastric pain, influenza A, AOM, and L TM perforation. Midpoint and restaging BMBx without evidence of disease.  Proceeded with consolidation with HiDAC (C1D1=4/3/25).    Today:  - D3 of HiDAC. Tolerating well thus far.  - Continue ppx antimicrobials, Pred Forte drops, and best supportive cares.  - Anticipate discharge home tomorrow, 4/6 AM, following completion of the chemotherapy regimen. Scheduled for labs/Neulasta at [facility] on Monday, 4/7.    HEME  # AML with NPM1 mutation  Had been seen by PCP Dec 2024 w/ progressive fatigue and nausea where she was found leukopenic WBC  2.4 and neutropenic w/ ANC 0.3. Hgb 12. Was referred to local hematology/oncology clinic for further evaluation and seen by Dr. Samina Harris. BMBx 2/10/25 done at OSH showed hypercellular marrow w/ trilineage hematopoiesis w/ dyspoiesis with mildly elevated blasts of 4% on morphology. NGS w/ NPM1, IDH2, and NRAS mutations. She was admitted to [facility] 2/26/25 for further workup and management.  Slides were re-reviewed by [facility], who noted hypercellular marrow with 8% blasts by morphology. Despite relatively low blast percentage, findings were felt most consistent with a diagnosis AML with NPM1 mutation by WHO 2022 (which does not require a specific blast threshold). Following interdepartmental discussions and review of the available literature, patient was started on intensive induction with 7+3 (Day 1=3/5/25). Midcycle BMBx 3/19/25 with no morphologic or immunophenotypic evidence of AML. D28 BMBx completed 3/31/25 with hypercellular marrow (60 to 70%) with no overt dysplasia or increase in blasts, noted flow with 4.3% blasts of unusual phenotype.  Cytogenetics and molecular pending.  Due to overall disposition timeline as well as patient living in [facility], preceded directly with consolidation chemotherapy with HiDAC (C1D1=4/3/25).  - PICC placed 3/5/2025, plan to remove prior to discharge  - Baseline cardiopulmonary studies:  - Echo w/ EF 60-65%, mild known aortic stenosis.   - EKG (2/27) with NSR, QTc 412  - CXR (2/26) with mildly increased streaky bibasilar opacities, atelectasis or edema. No consolidation.   - Baseline viral serologies: CMV IgG+, EBV IgG+, HepB sAg-, HepB cAb-, HepB sAb-, HSV1+/2+, HIV-  - HLA Typing sent on admission. Message sent to notify BMT team x2/27 for IP consult; per messages sent 4/2, plan to schedule in outpatient setting     Treatment plan: HiDAC (C1D1=4/3/2025)   - Cytarabine 3 g/m  IV q12h D1-3   - Neulasta 6 mg D5 - scheduled 4/7 at [facility]  Supportive medications: Zofran 8 mg q12h, Dexamethasone 4 mg q12h D1-3, Prednisolone eye drops QID D1-5     # Pancytopenia  Secondary to underlying hematologic malignancy and exacerbated by recent chemotherapy.  - Follow daily CBC with differential  - Transfuse to keep Hgb >7, plt >10K  - Blood consent obtained 2/26/25 on admission and placed in patient's paper chart.      ID  # Recurrent episodic  neutropenic fevers, resolved  # Influenza A, resolved  Episode #1:  2/25: On admission, patient noted subjective fever with chills and rhinitis beginning 2/25 PM prior to admission. No other localizing s/sx of infection. She was afebrile on admission but spiked a low-grade fever to 100.7 on 2/26 PM. Blood and urine cultures negative, CXR with streaky opacities but no burt consolidation. Work-up positive for influenza A, and she completed a course of Tamiflu 75 mg BID x5 days (2/26-3/3). She also received a short empiric course of IV cefepime (2/26-3/1) given concurrent neutropenia.  Episode #2:  3/13: Febrile to 100.4 overnight x3/13. No localizing/new symptoms. OVSS. Patient was wearing heated jacket that she attributed to temperature, no documentation noted of notifying cross cover MD. No further ID workup (BCx, UA, UC, or CXR) completed and no antibiotic changes made. Given borderline temperature, decision made to continue monitoring and no further work-up was undertaken.  Episode #3:  3/19: Tmax 100.6 overnight. Asymptomatic, although notes sweating upon waking the following morning. CXR with scattered patchy densities, especially in the lung bases. Respiratory panel negative. UA with protein and blood but otherwise bland, UCx negative. BCx NGTD. A CT C/A/P was done (for other reasons, as below) and notable for findings suggestive of esophagitis as well as possible bronchitis/small airways disease. She was treated with a 7-day course of IV cefepime (3/18-3/25) and fever did not recur.  Episode #4:  4/1: Tmax 101.4 overnight. Nonproductive cough, otherwise no new focal infectious symptoms. CXR with diffuse hazy opacities, UA bland, UCx NGTD. BCx remain NGTD. Cryptococcus, histoplasma, and fungitell negative. Received cefepime x72 hours (4/1-4/3), then discontinued without further fevers noted.   - Monitor for recurrent fevers    # ID prophylaxis  - Acyclovir 400 mg BID  - Levaquin 500 mg daily - when ANC <1.0 -  plan to resume on discharge  - Posaconazole 300 mg daily - HELD for consolidation - resume on discharge  - Posaconazole copay $1.60. Trialed initially on vori, then rotated to posa x3/18 given visual hallucinations     GI  # GERD, improved  # Epigastric pain, resolved  Reported recent increase in symptoms in the days leading up to admission and initiated famotidine. Persistent/progressive symptoms throughout admission. Cardiac work-up reassuring, lipase negative. Increased H2B to BID (3/9); given persistent symptoms, PPI started (3/14). Increased to BID (3/19). CT C/A/P with evidence of esophagitis (thought due to reflux/recent chemotherapy) and small hiatal hernia. Symptoms have improved on maximal medical therapy as below.  - Continue famotidine 20 mg BID  - Continue Protonix 40 mg BID  - GI cocktail PRN  - TUMS PRN  - GI consulted, EGD deferred for now as risks thought to outweigh the benefits in the setting of neutropenia; can reassess upon count recovery  - Continue to encourage lifestyle modifications: avoid straws, carbonated beverages, GERD precautions    # Risk of malnutrition  Secondary to esophagitis/GERD/mucositis picture as above, patient intake has decreased to small servings, mostly eating soup. On regular adult diet with thin liquids per SLP. Recommended trial of high caloric liquids, patient tried Strawberry Ensure and plans to continue to order with meals.  - RD following; appreciate recs     # Nausea/vomiting, improving  Patient noted ongoing nausea w/ occasional vomiting starting Dec 2024. Has been managed with PRN Zofran.  Endorsed nausea on admission, now resolved.   - PRN Zofran and compazine  - Could consider trial of PRN Zyprexa if 3rd agent is desired/required    # Elevated alkaline phosphatase, improving  Alk phos has been trending up since 4/1. LDH also increasing since 3/27.  x4/3. Suspect medication induced hepatic injury. Most recent CT chest/abd on 3/19 without intrahepatic  biliary dilatation or focal hepatic mass, s/p cholecystectomy.  - Daily CMP   - Consider liver US if develops abdominal pain, fevers, or elevation of bilirubin or LFTs.      PULM  # COPD  Longstanding history of COPD. On admission patient reports symptoms are manageable with no recent exacerbations. Most recent PFTs (2018) were normal.  - Continue PTA Breo Ellipta inhaler and PRN DuoNebs      CV  # Essential hypertension  - PTA lisinopril 10 mg daily has been HELD since 3/11 w/ soft BPs (90s-100s/50s-60s)     # Infrarenal abdominal aortic aneurysm  Noted incidentally on CT C/A/P (3/19/25), measuring 3.4 cm in diameter.  - Attention on follow-up imaging    RENAL/FEN  # Stage 2 CKD  Baseline Cr ~ 0.7. On admission Cr 0.77.  - Continue to trend on daily labs and encourage PO hydration     NEURO  # Chronic migraine w/o aura  # Chronic headaches  Present since childhood. Reportedly triggered by neck manipulation/movement. Reportedly well-managed with regimen below. Headaches occurring throughout admission with daily APAP use, also chronic and managed with APAP PRN at home. Patient notes that her headaches throughout admission have been consistent with her typical daily headaches with no reported changes in character, quality, location, severity, or frequency. She denies new associated neurological changes. Considered LP to exclude CNS leukemia, but given this reassuring history and absence of other indications for LP (no hyperleukocytosis, no monocytic phenotype, no FLT3 mutation, etc), this was ultimately deferred.  - APAP PRN  - Continue PTA sumatriptan and cyclobenzaprine PRN  - Could reconsider need for LP if headache worsens/changes in character or quality in the future    MISC  # Maculopapular rash  Noted on 3/25. Pruritic, localized to the neck, right shoulder, and flexural area behind the right knee. Clinically, affected areas consist of coalescing erythematous macules with scattered papules. No obvious vesicles  or bullae. DDx includes contact dermatitis (favored, possibly due to CHG wipes) versus atopic dermatitis (flexural areas raise suspicion for this) versus drug eruption versus other. Improved with triamcinolone ointment.  - Continue triamcinolone ointment 0.1% BID PRN to the affected areas  - Okay to defer CHG wipes given concern for contact dermatitis  - Monitor clinically    # BROOKS  # MDD  # At risk for adjustment disorder  Patient reports good control of anxiety/depressive symptoms prior to admission. Did note feeling overwhelmed by new diagnosis. Offered health psychology or cordelia, which she declined.  - Readdress health psych consult as indicated  - Continue PTA paroxetine   - Atarax as needed     # Tobacco use disorder  Has smoked since age 16, 1.5 ppd (roughly 65 pack years). Attempting to cut back as recently as 01/2025 to 0.5 ppd. We discussed the risks of smoking during induction chemotherapy including increased risk for infection in setting of severe neutropenia that could further complicate course, placing her at risk for severe complications that could be life-threatening or even fatal.  - Encourage smoking cessation; patient continues to smoke at this time, despite understanding/acknowledgement of the risks.  - Trialed nicotine patch, then self-discontinued after reporting that she felt the nicotine patch precipitated an anxiety attack/episode of chest pain on 3/9. Offered a lower dose versus alternative form of NRT, which patient declined.      # Degenerative disc disease  Appears likely multi-level; no MRI available for review, though note that patient has previously has epidural injections at L3-4 and L5-S1 (2016). Reported taking gabapentin 400 mg TID PRN prior to admission. Mild exacerbation noted 3/29; improved with resumption of gabapentin.  - Continue gabapentin 400 mg TID    Chronic Problems:  # Vitamin D deficiency - Continue PTA vit D supplement  # Seasonal allergies - Claritin 10 mg  daily  # Rheumatoid arthritis - Patient reported trying treatment though was unable to tolerate well. Managed with Tylenol PRN. Most recent RF >650 (2/10/25). JI negative.    # Prediabetes - Last A1c 6.0 x12/9/24. Has been managing with lifestyle modifications.   # Mixed hyperlipidemia - Lipid panel has remained at goal, 1/14/25 panel w/ cholesterol 163, , LDL 92, HDL 45. - Held PTA atorvastatin on admission due to chemotherapy interactions. Rotate to rosuvastatin 20 mg daily on discharge for better drug-drug interaction profile.   # H/o aortic stenosis - Patient noted on admission longstanding history of aortic stenosis. Pre-chemo echo w/ EF 60-65% and mild aortic stenosis and insufficiency. No previous echo to compare.     Resolved Hospital Problems:  # Mild hyponatremia, resolved - New mild hyponatremia noted 3/13 with Na 135 ? 130. Asymptomatic. Now resolved.  # Non-radiating chest pain, resolved - for details see note from 3/21 and prior   # Urinary frequency + Dysuria, resolved - On admission, patient noted longstanding history of urinary frequency though new mild dysuria. UA (2/26) negative, UC with no growth. Symptoms have since resolved.  # R AOM with purulent effusion and Small L TM perforation, resolved - ENT consulted, appreciate recs. Completed 7-day course Levaquin 750mg daily and 5 day course of floxin drops to left ear for perforation     Clinically Significant Risk Factors               # Hypoalbuminemia: Lowest albumin = 3.2 g/dL at 3/24/2025  4:59 AM, will monitor as appropriate     # Hypertension: Noted on problem list             # Moderate Malnutrition: based on nutrition assessment and treatment provided per dietitian's recommendations.      # Financial/Environmental Concerns: none  # Asthma: noted on problem list       Fluids/Electrolytes/Nutrition   - IVF bolus PRN   - PRN lyte replacement per standing protocol  - Regular diet as tolerated      Lines: PICC     PPX  VTE: Lovenox held  3/13 for TCP, resumed 3/28   GI: Pepcid, Protonix  Bowels: prn senna and miralax  Activity: as tolerated    Code Status: DNR/DNI    Disposition: Inpatient admission to Heme Malignancy service for treatment of AML; discharge to home pending tolerance of consolidation chemotherapy. Anticipated discharge 4/6.  Follow-up: Will follow with Dr. Harris as primary oncologist on 4/11 and go to [facility] for twice weekly labs/transfusions (M/Th) starting 4/7. Neulasta will also be given 4/7 at [facility].    Medically Ready for Discharge: Anticipated Tomorrow    Staffed with Dr. Byrd.    I spent 45 minutes in the care of this patient today, which included time necessary for review of interval events, obtaining history and physical exam, ordering medication(s)/test(s) as medically indicated, discussion with interdisciplinary/consult team(s), care coordination, and documentation time.     Chelsie Davila PA-C  Hematology/oncology  Pager: [phone]    Interval History   No acute overnight events. Afebrile and vitally stable. Nursing notes reviewed. Alejandra is feeling well today. She is so excited to be going home tomorrow. Tolerating chemo well thus far; she denies headaches, vision changes, numbness, tingling, weakness, tremors, balance/coordination difficulties, or other concerns. No eye pain/redness. No nausea/vomiting. No other concerns. Appetite good; she is eating Swedish Fish and cheesy Bugles during my visit. Reviewed interval lab results and plan of care for the day. All questions and concerns addressed at bedside.      A comprehensive review of symptoms was performed and was negative except as detailed in the interval history above.    Physical Exam   Vital Signs with Ranges  Temp:  [97.4  F (36.3  C)-97.9  F (36.6  C)] 97.7  F (36.5  C)  Pulse:  [67-78] 72  Resp:  [16-18] 16  BP: (113-157)/(62-85) 136/76  SpO2:  [94 %-98 %] 95 %    I/O last 3 completed shifts:  In: 2216.3 [P.O.:1584; IV  Piggyback:332.3]  Out: 950 [Urine:950]    Vitals:    04/03/25 1019 04/04/25 0749 04/05/25 0852   Weight: 81.2 kg (179 lb 1.6 oz) 80.9 kg (178 lb 4.8 oz) 81.3 kg (179 lb 4.8 oz)     Constitutional: Pleasant and cooperative female, in NAD. Alert, interactive, non-toxic. Smiling and conversational.  HEENT: NC/AT, sclera clear, conjunctiva normal, MMM without lesion, thrush, or abscess; poor dentition.  Respiratory: No increased work of breathing. Breath sounds mildly diminished to the lung bases bilaterally. No wheezing.   Cardiovascular: RRR, systolic murmur. No peripheral edema.   GI: Normal bowel sounds. Abdomen is obese, soft, non-distended and non-tender to palpation.  Skin: Warm, dry, well-perfused.   Musculoskeletal: Diminished muscle bulk, no gross deformities.  Neurologic: A&O. Answers questions appropriately, speech normal. Normal gait. Moves all extremities spontaneously.  Psychiatric: Normal mood and affect.  Vascular access:  PICC on RUE CDI, non-tender, no surrounding erythema.    Medications   Current Facility-Administered Medications   Medication Dose Route Frequency Provider Last Rate Last Admin     Current Facility-Administered Medications   Medication Dose Route Frequency Provider Last Rate Last Admin    acyclovir (ZOVIRAX) tablet 400 mg  400 mg Oral BID Chelsie Murphy PA-C   400 mg at 04/05/25 0904    allopurinol (ZYLOPRIM) tablet 300 mg  300 mg Oral Daily Britton Jaimes MD   300 mg at 04/05/25 0903    cytarabine (PF) (CYTOSAR) 5,730 mg in D5W 332.3 mL infusion  3 g/m2 (Treatment Plan Recorded) Intravenous Q12H Britton Jaimes .8 mL/hr at 04/05/25 0532 5,730 mg at 04/05/25 0532    dexAMETHasone (DECADRON) tablet 4 mg  4 mg Oral Q12H Britton Jaimes MD   4 mg at 04/05/25 0504    enoxaparin ANTICOAGULANT (LOVENOX) injection 40 mg  40 mg Subcutaneous Q24H Nery Crowell PA-C   40 mg at 04/04/25 2014    famotidine (PEPCID) tablet 20 mg  20 mg Oral BID Zully Garcia  DESEAN ORTIZ   20 mg at 04/05/25 0903    [START ON 4/7/2025] filgrastim-aafi (NIVESTYM) injection 480 mcg  480 mcg Subcutaneous Daily at 8 pm Britton Jaimes MD        fluticasone-vilanterol (BREO ELLIPTA) 100-25 MCG/ACT inhaler 1 puff  1 puff Inhalation Daily Chelsie Murphy PA-C   1 puff at 04/05/25 0902    gabapentin (NEURONTIN) capsule 400 mg  400 mg Oral TID Zully Garcia PA-C   400 mg at 04/05/25 1443    heparin lock flush 10 unit/mL injection 5-20 mL  5-20 mL Intracatheter Q24H Chelsie Murphy PA-C   5 mL at 04/04/25 0450    [Held by provider] lisinopril (ZESTRIL) tablet 10 mg  10 mg Oral Daily Jyoti Tenorio PA-C   10 mg at 03/10/25 1039    loratadine (CLARITIN) tablet 10 mg  10 mg Oral Daily Chelsie Murphy PA-C   10 mg at 04/05/25 0903    ondansetron (ZOFRAN) tablet 8 mg  8 mg Oral Q12H PremnathBritton MD   8 mg at 04/05/25 0504    pantoprazole (PROTONIX) EC tablet 40 mg  40 mg Oral BID  Nery Crowell PA-C   40 mg at 04/05/25 0903    PARoxetine (PAXIL) tablet 20 mg  20 mg Oral [facility] Chelsie Murphy PA-C   20 mg at 04/05/25 0903    [Held by provider] posaconazole (NOXAFIL) DR tablet TBEC 300 mg  300 mg Oral Nery Dubose PA-C   300 mg at 04/01/25 1026    prednisoLONE acetate (PRED FORTE) 1 % ophthalmic susp 2 drop  2 drop Both Eyes 4x Daily PremBritton granado MD   2 drop at 04/05/25 1214    sodium chloride (PF) 0.9% PF flush 10-40 mL  10-40 mL Intracatheter Q8H Chelsie Murphy PA-C   10 mL at 04/04/25 0450    sucralfate (CARAFATE) suspension 1 g  1 g Oral 4x Daily AC & HS Aman Cervantes MD   1 g at 04/05/25 1214    Vitamin D3 (CHOLECALCIFEROL) tablet 1,000 Units  1,000 Units Oral Daily Chelsie Murphy PA-C   1,000 Units at 04/05/25 0903     Antiinfectives  Anti-infectives (From now, onward)      Start     Dose/Rate Route Frequency Ordered Stop    03/18/25 0800  [Held by provider]  posaconazole  (NOXAFIL) DR tablet TBEC 300 mg        (On hold since Tue 4/1/2025 at 1349 until manually unheld; held by Nery Crowell PA-CHold Reason: Other)    300 mg Oral EVERY MORNING 03/17/25 1242      02/26/25 2000  acyclovir (ZOVIRAX) tablet 400 mg         400 mg Oral 2 TIMES DAILY 02/26/25 1307            Data   CBC   Recent Labs   Lab 04/05/25 0519 04/04/25 0452 04/03/25 0437 04/02/25  0502   WBC 5.6 8.1 9.6 8.7   RBC 2.54* 2.67* 2.37* 2.29*   HGB 7.9* 8.2* 7.2* 7.2*   HCT 24.9* 25.3* 22.5* 22.3*   MCV 98 95 95 97   MCH 31.1 30.7 30.4 31.4   MCHC 31.7 32.4 32.0 32.3   RDW 16.2* 16.7* 16.6* 16.5*   PLT 1,489* 1,699* 1,471* 1,501*     CMP   Recent Labs   Lab 04/05/25 0519 04/04/25 0452 04/03/25  0437 04/02/25  0502    139 138 141   POTASSIUM 4.7 4.5 3.7 3.5   CHLORIDE 101 99 100 102   CO2 28 28 26 27   ANIONGAP 11 12 12 12   * 149* 118* 137*   BUN 21.3* 12.5 9.5 9.5   CR 0.57 0.57 0.61 0.68   GFRESTIMATED >90 >90 >90 >90   TOIBN 9.5 9.4 8.8 8.9   MAG 2.6* 2.8* 2.3 2.2   PHOS 4.4 4.3 3.3 3.7   PROTTOTAL 7.1 7.2 6.8 6.5   ALBUMIN 3.7 3.7 3.4* 3.3*   BILITOTAL 0.2 0.3 0.2 0.2   ALKPHOS 192* 228* 221* 180*   AST 19 25 39 36   ALT 34 43 49 37     LFTs   Recent Labs   Lab 04/05/25 0519   PROTTOTAL 7.1   ALBUMIN 3.7   BILITOTAL 0.2   ALKPHOS 192*   AST 19   ALT 34     Coagulation Studies   Recent Labs   Lab 04/04/25  0452   INR 1.11   PTT 34

## 2025-04-05 NOTE — PLAN OF CARE
Goal Outcome Evaluation:      Plan of Care Reviewed With: patient    Overall Patient Progress: no changeOverall Patient Progress: no change    Outcome Evaluation: C1D3 HiDAC    [id number]: Alejandra is A&Ox4 with VSS on RA. No complaints of N/V or SOB. Some low back pain this evening. Given PRN tylenol x1 with relief. Atarax x1 this am. 1u pRBCs given this shift in preparation for discharge tomorrow. Dose 5 of HD Cytarabine given this evening, tolerating well. Family at bedside. Alejandra is very excited to discharge this evening. Able to make needs known. Continue with POC.

## 2025-04-05 NOTE — PROGRESS NOTES
Nursing Focus: Chemotherapy  D: Positive brisk bright red blood return via PICC. Insertion site is clean/dry/intact, dressing intact with no complaints of pain.  Urine output is recorded in intake Doc Flowsheet.    I: Premedications given per order (see electronic medical administration record). Dose #3 of HiDAC started to infuse over 3 hours. Reviewed pt teaching on chemotherapy side effects.  Pt denies need for further teaching. Chemotherapy double checked per protocol by two chemotherapy competent RN's.   A: Tolerating chemo well. Denies nausea.   P: Continue to monitor urine output and symptoms of nausea. Screen for symptoms of toxicity.

## 2025-04-05 NOTE — PROGRESS NOTES
Chemotherapy  D: Blood return is present via PICC catheter. Cerebellar exam intact.   I: Decadron and zofran given (see electronic medical administration record). Dose #4 of HD cytarabine started to infuse over 3 hours.   A: Tolerating well.  P: Continue to monitor urine output and symptoms of nausea. Screen for symptoms of toxicity.

## 2025-04-05 NOTE — PLAN OF CARE
[id number]    Pain: Controlled with PRN atarax and tylenol  Neuro:WDL  CV:WDL  Resp:.WDL except, cough, breath sounds - infrequent, non-productive cough   GI:WDL  Skin: integrity, .WDL except- bruise in abdomen  Activity Assistance: independent  Safety/Falls Risk: Level of Risk: Moderate Risk  Consults: ENT, SLP  Procedures/Imaging: BM Bx- 3/31- done  4/1-Chest Xray  Precautions: Neutropenia   Diet: Regular    Pt able to rest between cares overnight. In good spirits and looking forward to her son and sister visiting tomorrow and hopefully discharging Sunday after chemo. Red PICC line sluggish HL, Purple infusing HiDAC. Able to make needs known.     Goal Outcome Evaluation:      Plan of Care Reviewed With: patient    Overall Patient Progress: no changeOverall Patient Progress: no change    Outcome Evaluation: C1D2 HiDAC

## 2025-04-06 VITALS
WEIGHT: 183.2 LBS | SYSTOLIC BLOOD PRESSURE: 137 MMHG | HEIGHT: 64 IN | TEMPERATURE: 97.8 F | OXYGEN SATURATION: 94 % | BODY MASS INDEX: 31.28 KG/M2 | RESPIRATION RATE: 16 BRPM | HEART RATE: 87 BPM | DIASTOLIC BLOOD PRESSURE: 73 MMHG

## 2025-04-06 DIAGNOSIS — C92.00 ACUTE MYELOID LEUKEMIA NOT HAVING ACHIEVED REMISSION (H): Primary | ICD-10-CM

## 2025-04-06 PROBLEM — D61.810 PANCYTOPENIA DUE TO ANTINEOPLASTIC CHEMOTHERAPY: Status: ACTIVE | Noted: 2025-04-06

## 2025-04-06 PROBLEM — K44.9 HIATAL HERNIA: Status: ACTIVE | Noted: 2025-04-06

## 2025-04-06 PROBLEM — T45.1X5A PANCYTOPENIA DUE TO ANTINEOPLASTIC CHEMOTHERAPY: Status: ACTIVE | Noted: 2025-04-06

## 2025-04-06 LAB
ABO + RH BLD: NORMAL
ALBUMIN SERPL BCG-MCNC: 3.7 G/DL (ref 3.5–5.2)
ALP SERPL-CCNC: 191 U/L (ref 40–150)
ALT SERPL W P-5'-P-CCNC: 42 U/L (ref 0–50)
ANION GAP SERPL CALCULATED.3IONS-SCNC: 11 MMOL/L (ref 7–15)
AST SERPL W P-5'-P-CCNC: 27 U/L (ref 0–45)
BACTERIA BLD CULT: NO GROWTH
BACTERIA BLD CULT: NO GROWTH
BASOPHILS # BLD AUTO: 0 10E3/UL (ref 0–0.2)
BASOPHILS NFR BLD AUTO: 0 %
BILIRUB SERPL-MCNC: 0.3 MG/DL
BLD GP AB SCN SERPL QL: NEGATIVE
BUN SERPL-MCNC: 21.8 MG/DL (ref 6–20)
CALCIUM SERPL-MCNC: 9 MG/DL (ref 8.8–10.4)
CHLORIDE SERPL-SCNC: 102 MMOL/L (ref 98–107)
CREAT SERPL-MCNC: 0.52 MG/DL (ref 0.51–0.95)
EGFRCR SERPLBLD CKD-EPI 2021: >90 ML/MIN/1.73M2
EOSINOPHIL # BLD AUTO: 0 10E3/UL (ref 0–0.7)
EOSINOPHIL NFR BLD AUTO: 0 %
ERYTHROCYTE [DISTWIDTH] IN BLOOD BY AUTOMATED COUNT: 16.1 % (ref 10–15)
GLUCOSE SERPL-MCNC: 114 MG/DL (ref 70–99)
HCO3 SERPL-SCNC: 27 MMOL/L (ref 22–29)
HCT VFR BLD AUTO: 27.8 % (ref 35–47)
HGB BLD-MCNC: 9 G/DL (ref 11.7–15.7)
IMM GRANULOCYTES # BLD: 0.1 10E3/UL
IMM GRANULOCYTES NFR BLD: 1 %
LDH SERPL L TO P-CCNC: 325 U/L (ref 0–250)
LYMPHOCYTES # BLD AUTO: 0.1 10E3/UL (ref 0.8–5.3)
LYMPHOCYTES NFR BLD AUTO: 3 %
MAGNESIUM SERPL-MCNC: 2.6 MG/DL (ref 1.7–2.3)
MCH RBC QN AUTO: 30.5 PG (ref 26.5–33)
MCHC RBC AUTO-ENTMCNC: 32.4 G/DL (ref 31.5–36.5)
MCV RBC AUTO: 94 FL (ref 78–100)
MONOCYTES # BLD AUTO: 0 10E3/UL (ref 0–1.3)
MONOCYTES NFR BLD AUTO: 0 %
NEUTROPHILS # BLD AUTO: 4.4 10E3/UL (ref 1.6–8.3)
NEUTROPHILS NFR BLD AUTO: 95 %
NRBC # BLD AUTO: 0 10E3/UL
NRBC BLD AUTO-RTO: 0 /100
PHOSPHATE SERPL-MCNC: 3.7 MG/DL (ref 2.5–4.5)
PLATELET # BLD AUTO: 983 10E3/UL (ref 150–450)
POTASSIUM SERPL-SCNC: 4.4 MMOL/L (ref 3.4–5.3)
PROT SERPL-MCNC: 7 G/DL (ref 6.4–8.3)
RBC # BLD AUTO: 2.95 10E6/UL (ref 3.8–5.2)
SODIUM SERPL-SCNC: 140 MMOL/L (ref 135–145)
SPECIMEN EXP DATE BLD: NORMAL
URATE SERPL-MCNC: 3.5 MG/DL (ref 2.4–5.7)
WBC # BLD AUTO: 4.6 10E3/UL (ref 4–11)

## 2025-04-06 PROCEDURE — 84100 ASSAY OF PHOSPHORUS: CPT | Performed by: PHYSICIAN ASSISTANT

## 2025-04-06 PROCEDURE — 250N000013 HC RX MED GY IP 250 OP 250 PS 637: Performed by: STUDENT IN AN ORGANIZED HEALTH CARE EDUCATION/TRAINING PROGRAM

## 2025-04-06 PROCEDURE — 84550 ASSAY OF BLOOD/URIC ACID: CPT | Performed by: PHYSICIAN ASSISTANT

## 2025-04-06 PROCEDURE — 83735 ASSAY OF MAGNESIUM: CPT

## 2025-04-06 PROCEDURE — 250N000013 HC RX MED GY IP 250 OP 250 PS 637

## 2025-04-06 PROCEDURE — 250N000013 HC RX MED GY IP 250 OP 250 PS 637: Performed by: HOSPITALIST

## 2025-04-06 PROCEDURE — 258N000003 HC RX IP 258 OP 636: Performed by: STUDENT IN AN ORGANIZED HEALTH CARE EDUCATION/TRAINING PROGRAM

## 2025-04-06 PROCEDURE — 83615 LACTATE (LD) (LDH) ENZYME: CPT

## 2025-04-06 PROCEDURE — 250N000012 HC RX MED GY IP 250 OP 636 PS 637: Performed by: STUDENT IN AN ORGANIZED HEALTH CARE EDUCATION/TRAINING PROGRAM

## 2025-04-06 PROCEDURE — 250N000011 HC RX IP 250 OP 636: Performed by: STUDENT IN AN ORGANIZED HEALTH CARE EDUCATION/TRAINING PROGRAM

## 2025-04-06 PROCEDURE — 86900 BLOOD TYPING SEROLOGIC ABO: CPT

## 2025-04-06 PROCEDURE — 80053 COMPREHEN METABOLIC PANEL: CPT

## 2025-04-06 PROCEDURE — 99239 HOSP IP/OBS DSCHRG MGMT >30: CPT | Mod: FS | Performed by: INTERNAL MEDICINE

## 2025-04-06 PROCEDURE — 85004 AUTOMATED DIFF WBC COUNT: CPT

## 2025-04-06 RX ORDER — CYCLOBENZAPRINE HCL 10 MG
10 TABLET ORAL AT BEDTIME
Qty: 30 TABLET | Refills: 3 | Status: SHIPPED | OUTPATIENT
Start: 2025-04-06

## 2025-04-06 RX ADMIN — Medication 1000 UNITS: at 06:37

## 2025-04-06 RX ADMIN — FAMOTIDINE 20 MG: 20 TABLET, FILM COATED ORAL at 06:36

## 2025-04-06 RX ADMIN — LORATADINE 10 MG: 10 TABLET ORAL at 06:36

## 2025-04-06 RX ADMIN — ACYCLOVIR 400 MG: 400 TABLET ORAL at 06:36

## 2025-04-06 RX ADMIN — PAROXETINE HYDROCHLORIDE 20 MG: 20 TABLET, FILM COATED ORAL at 06:36

## 2025-04-06 RX ADMIN — ONDANSETRON HYDROCHLORIDE 8 MG: 8 TABLET, FILM COATED ORAL at 05:12

## 2025-04-06 RX ADMIN — ALLOPURINOL 300 MG: 300 TABLET ORAL at 07:59

## 2025-04-06 RX ADMIN — PANTOPRAZOLE SODIUM 40 MG: 40 TABLET, DELAYED RELEASE ORAL at 06:36

## 2025-04-06 RX ADMIN — GABAPENTIN 400 MG: 300 CAPSULE ORAL at 06:37

## 2025-04-06 RX ADMIN — PREDNISOLONE ACETATE 2 DROP: 10 SUSPENSION/ DROPS OPHTHALMIC at 07:59

## 2025-04-06 RX ADMIN — SUCRALFATE ORAL 1 G: 1 SUSPENSION ORAL at 06:38

## 2025-04-06 RX ADMIN — CYTARABINE 5730 MG: 100 INJECTION, SOLUTION INTRATHECAL; INTRAVENOUS; SUBCUTANEOUS at 05:39

## 2025-04-06 RX ADMIN — DEXAMETHASONE 4 MG: 4 TABLET ORAL at 05:12

## 2025-04-06 RX ADMIN — FLUTICASONE FUROATE AND VILANTEROL TRIFENATATE 1 PUFF: 100; 25 POWDER RESPIRATORY (INHALATION) at 06:35

## 2025-04-06 ASSESSMENT — ACTIVITIES OF DAILY LIVING (ADL)
ADLS_ACUITY_SCORE: 35

## 2025-04-06 NOTE — PROGRESS NOTES
Nursing Focus: Chemotherapy  D: Positive blood return via PICC . Insertion site is clean/dry/intact, dressing intact with no complaints of pain.  Pre infusion assessment documented in Flowsheet (if applicable).    I: Premedications given per order (see electronic medical administration record). Dose #5 of HD Cytarabine started to infuse over 3 hours. Reviewed pt teaching on chemotherapy side effects.  Pt denies need for further teaching. Chemotherapy double checked per protocol by two chemotherapy competent RN's.   A: Tolerating infusion well. Denies nausea and or pain.   P: Continue to monitor urine output and symptoms of nausea. Screen for symptoms of toxicity.

## 2025-04-06 NOTE — PLAN OF CARE
Goal Outcome Evaluation:      Plan of Care Reviewed With: patient    Overall Patient Progress: improvingOverall Patient Progress: improving    Outcome Evaluation: C1D4 HiDAC    [id number]: Alejandra YANG&[id number] with VSS on RA. No complaints. Chemo completed this shift, tolerated well. Able to make needs known. PICC removed. Discharged to home, see discharge note.

## 2025-04-06 NOTE — PROGRESS NOTES
Nursing Focus: Discharge    D: Patient discharged to home at 1052. Patient walked and accompanied by family.    I: Discharge prescriptions sent to discharge pharmacy to be filled. All discharge medications and instructions reviewed with Alejandra and her sister by bedside RN. Patient instructed to call clinic triage nurse if she experiences a fever >100.4, uncontrolled nausea, vomiting, diarrhea, or pain; or experiences any signs or symptoms of bleeding. Other phone numbers to call with questions or concerns after discharge reviewed. PICC removed. Education completed.    A: Alejandra verbalized understanding of discharge medications and instructions. Patient will  medications at discharge pharmacy.     P: Patient to follow-up in clinic on 4/7 with labs.

## 2025-04-06 NOTE — PROGRESS NOTES
Chemotherapy  D: Blood return is present via PICC catheter. Cerebellar exam intact.   I: Decadron and zofran given (see electronic medical administration record). Dose #6 of HD cytarabine started to infuse over 3 hours.   A: Tolerating well.  P: Continue to monitor urine output and symptoms of nausea. Screen for symptoms of toxicity.

## 2025-04-06 NOTE — PLAN OF CARE
[id number]    Summary Type: 5A Report   Pain: Controlled   Neuro:WDL  CV:WDL  Resp:.WDL except, cough, breath sounds - infrequent, non-productive cough   GI:WDL  Skin: .WDL except, integrity- bruise in abdomen  Activity Assistance: independent  Safety/Falls Risk: Level of Risk: Moderate Risk  Consults: ENT, SLP  Procedures/Imaging: BM Bx- 3/31- done  4/1-Chest Xray  Precautions: Neutropenia   Diet: Regular    Pt able to rest between cares. Family at bedside and very excited to discharge today after this dose of Cytarabine is complete    Goal Outcome Evaluation:      Plan of Care Reviewed With: patient    Overall Patient Progress: improvingOverall Patient Progress: improving    Outcome Evaluation: C1D3 HiDAC

## 2025-04-06 NOTE — DISCHARGE SUMMARY
[facility]  Discharge Summary  Hematology / Oncology    Date of Admission:  2/26/2025  Date of Discharge:  04/06/2025  Discharging Provider: Chelsie Davila PA-C  Date of Service (when I saw the patient): 04/06/2025    Discharge Diagnoses   Active Problems:    Gastroesophageal reflux disease    Tobacco use disorder    Asthma    Pulmonary emphysema, unspecified emphysema type (H)    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)    Pancytopenia due to antineoplastic chemotherapy    Hiatal hernia    History of Present Illness   Alejandra Villegas is a 59 year old female with a past medical history significant for COPD, migraines, rheumatoid arthritis, aortic stenosis, Afib, and anxiety who presented to an outside hospital with cytopenias and was found on BMBx to have hypercellular marrow with 4% blasts, 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 She was subsequently admitted to [facility] on 2/26/25 for further work-up and management and pathology re-review was most consistent with AML with NPM1 mutation by WHO 2022 (which does not require a specific blast threshold). Following further multidisciplinary discussion, she started intensive induction with 7+3 (D1=3/5/25). Her course was complicated by recurrent neutropenic fevers (4 discrete episodes), for which she was treated with broad-spectrum IV antibiotics as detailed below. Her induction was also notable for severe epigastric pain and reflux symptoms for which GI was consulted. She was found on imaging to have esophagitis and a hiatal hernia, and her symptoms improved with optimization of her GERD therapy and lifestyle modifications as detailed below. With regards to her AML, she tolerated chemotherapy overall very well, and her midpoint bone marrow biopsy showed relative aplasia; subsequently, a repeat biopsy at count recovery demonstrated remission.  Her counts recovered robustly, and she  was noted to have marked thrombocytosis, thought reactive/due to marrow overshoot. She then proceeded with the first cycle of consolidation with HiDAC (C1D1=4/3/25), which she tolerated well. She was eager to be discharged to home on completion of chemotherapy for ongoing cares and follow-up closer to home.    Prior to discharge, I reviewed with Alejandra the plan of care, including upcoming follow-up appointments and new medications. Appropriate prescriptions were sent to the discharge pharmacy, as needed. We reviewed strict discharge precautions, including reasons to call clinic triage or present to the ED, and she voiced understanding. She was provided with the clinic triage number, as well as written discharge instructions, in her discharge paperwork. Patient had an opportunity to ask questions, all of which were answered to her stated satisfaction. On the day of discharge, Alejandra was overall well-appearing, hemodynamically stable, and felt safe and comfortable with the plans for discharge to home with follow-up as described.    Outpatient follow-up issues/notes:  - NPM1 PCR (send-out) for MRD assessment not collected prior to C1 of HiDAC. Lab ordered, needs to be collected  - Encourage smoking cessation. Would continue posaconazole ppx, regardless of ANC, given high risk for fungal pulmonary infection in the setting of ongoing tobacco abuse.  - Next due for chemotherapy (C2 of HiDAC) on 5/1/25; admit to [facility] requested    New discharge medications:  - Acyclovir  - Levofloxacin  - Posaconazole  - Pepcid  - Hydroxyzine  - Loratadine  - Pantoprazole  - Pred Forte eye drops  - Compazine  - Crestor (subbed for atorvastatin due to DDI with posaconazole)  - Sucralfate  - Refilled: Albuterol HFA, Flexeril, gabapentin, Zofran, Vitamin D    Next follow-up:  Future Appointments   Date Time Provider Department Center   4/7/2025 10:20 AM  LAB [facility]   4/7/2025 10:30 AM  INFUSION CHAIR 3 [facility] [place]    4/10/2025 10:10 AM GH LAB [facility]  [facility]   4/10/2025 10:30 AM GH INFUSION CHAIR 6 [facility]   4/11/2025  9:30 AM Samina Harris MD [facility]  [facility]   4/14/2025  8:10 AM GH LAB [facility]   4/14/2025  8:30 AM GH INFUSION CHAIR 5 [facility]   4/17/2025  9:10 AM GH LAB LABR [facility]   4/17/2025  9:30 AM GH INFUSION M410 (PROCEDURE) [facility]      Hospital Course   Alejandra Villegas was admitted on 2/26/2025.  The following problems were addressed during her hospitalization:    HEME  # AML with NPM1 mutation  Had been seen by PCP Dec 2024 w/ progressive fatigue and nausea where she was found leukopenic WBC  2.4 and neutropenic w/ ANC 0.3. Hgb 12. Was referred to local hematology/oncology clinic for further evaluation and seen by Dr. Samina Harris. BMBx 2/10/25 done at OSH showed hypercellular marrow w/ trilineage hematopoiesis w/ dyspoiesis with mildly elevated blasts of 4% on morphology. NGS w/ NPM1, IDH2, and NRAS mutations. She was admitted to [facility] 2/26/25 for further workup and management. Slides were re-reviewed by [facility] Hematopathology, who noted hypercellular marrow with 8% blasts by morphology. Despite relatively low blast percentage, findings were felt most consistent with a diagnosis AML with NPM1 mutation by WHO 2022 (which does not require a specific blast threshold). Following interdepartmental discussions and review of the available literature, patient was started on intensive induction with 7+3 (Day 1=3/5/25). Midcycle BMBx 3/19/25 with no morphologic or immunophenotypic evidence of AML. D28 BMBx completed 3/31/25 with hypercellular marrow (60 to 70%) with no overt dysplasia or increase in blasts, noted flow with 4.3% blasts of unusual phenotype.  Cytogenetics and molecular pending.  Due to overall disposition timeline as well as patient living in [facility], preceded directly with consolidation chemotherapy with HiDAC (C1D1=4/3/25).  - PICC placed  3/5/2025, plan to remove prior to discharge  - Baseline cardiopulmonary studies:  - Echo w/ EF 60-65%, mild known aortic stenosis.   - EKG (2/27) with NSR, QTc 412  - CXR (2/26) with mildly increased streaky bibasilar opacities, atelectasis or edema. No consolidation.   - Baseline viral serologies: CMV IgG+, EBV IgG+, HepB sAg-, HepB cAb-, HepB sAb-, HSV1+/2+, HIV-  - HLA Typing sent on admission. Message sent to notify BMT team x2/27 for IP consult; per messages sent 4/2, plan to schedule in outpatient setting      Treatment plan: HiDAC (C1D1=4/3/2025)   - Cytarabine 3 g/m  IV q12h D1-3   - Neulasta 6 mg D5 - scheduled 4/7 at [facility]  Supportive medications: Zofran 8 mg q12h, Dexamethasone 4 mg q12h D1-3, Prednisolone eye drops QID D1-5     # Pancytopenia due to chemotherapy, resolved  Secondary to underlying hematologic malignancy and exacerbated by recent chemotherapy. Recovered   - Follow daily CBC with 3x weekly labs  - Transfuse to keep Hgb >7, plt >10K     ID  # Recurrent episodic neutropenic fevers, resolved  # Influenza A, resolved  Episode #1:  2/25: On admission, patient noted subjective fever with chills and rhinitis beginning 2/25 PM prior to admission. No other localizing s/sx of infection. She was afebrile on admission but spiked a low-grade fever to 100.7 on 2/26 PM. Blood and urine cultures negative, CXR with streaky opacities but no burt consolidation. Work-up positive for influenza A, and she completed a course of Tamiflu 75 mg BID x5 days (2/26-3/3). She also received a short empiric course of IV cefepime (2/26-3/1) given concurrent neutropenia.  Episode #2:  3/13: Febrile to 100.4 overnight x3/13. No localizing/new symptoms. OVSS. Patient was wearing heated jacket that she attributed to temperature, no documentation noted of notifying cross cover MD. No further ID workup (BCx, UA, UC, or CXR) completed and no antibiotic changes made. Given borderline temperature, decision made to  continue monitoring and no further work-up was undertaken.  Episode #3:  3/19: Tmax 100.6 overnight. Asymptomatic, although notes sweating upon waking the following morning. CXR with scattered patchy densities, especially in the lung bases. Respiratory panel negative. UA with protein and blood but otherwise bland, UCx negative. BCx NGTD. A CT C/A/P was done (for other reasons, as below) and notable for findings suggestive of esophagitis as well as possible bronchitis/small airways disease. She was treated with a 7-day course of IV cefepime (3/18-3/25) and fever did not recur.  Episode #4:  4/1: Tmax 101.4 overnight. Nonproductive cough, otherwise no new focal infectious symptoms. CXR with diffuse hazy opacities, UA bland, UCx NGTD. BCx remain NGTD. Cryptococcus, histoplasma, and fungitell negative. Received cefepime x72 hours (4/1-4/3), then discontinued without further fevers noted.   - Monitor for recurrent fevers  - Strict neutropenic precautions given on discharge     # ID prophylaxis  - Acyclovir 400 mg BID  - Levaquin 250 mg daily when ANC <1.0 - resumed on discharge  - Posaconazole 300 mg daily - resumed on discharge  - Note: Posaconazole copay $1.60. Trialed initially on vori, then rotated to posa x3/18 given visual hallucinations     GI  # GERD, improved  # Epigastric pain, resolved  Reported recent increase in symptoms in the days leading up to admission and initiated famotidine. Persistent/progressive symptoms throughout admission. Cardiac work-up reassuring, lipase negative. Increased H2B to BID (3/9); given persistent symptoms, PPI started (3/14). Increased to BID (3/19). CT C/A/P with evidence of esophagitis (thought due to reflux/recent chemotherapy) and small hiatal hernia. Symptoms have improved on maximal medical therapy as below.  - Continue famotidine 20 mg BID  - Continue Protonix 40 mg BID  - Carafate QID PRN  - TUMS PRN  - GI consulted, EGD deferred for now as risks thought to outweigh the  benefits in the setting of neutropenia; can reassess upon count recovery  - Continue to encourage lifestyle modifications: avoid straws, carbonated beverages, GERD precautions     # Risk of malnutrition  Secondary to esophagitis/GERD/mucositis picture as above, patient intake has decreased to small servings, mostly eating soup. On regular adult diet with thin liquids per SLP. Recommended trial of high caloric liquids, patient tried Strawberry Ensure and plans to continue to order with meals.  - RD following; appreciate recs     # Nausea/vomiting, improving  Patient noted ongoing nausea w/ occasional vomiting starting Dec 2024. Has been managed with PRN Zofran.  Endorsed nausea on admission, now resolved.   - PRN Zofran and compazine  - Could consider trial of PRN Zyprexa if 3rd agent is desired/required     # Elevated alkaline phosphatase, improving  Alk phos has been trending up since 4/1. LDH also increasing since 3/27.  x4/3. Suspect medication induced hepatic injury. Most recent CT chest/abd on 3/19 without intrahepatic biliary dilatation or focal hepatic mass, s/p cholecystectomy.  - Daily CMP   - Consider liver US if develops abdominal pain, fevers, or elevation of bilirubin or LFTs.      PULM  # COPD  Longstanding history of COPD. On admission patient reports symptoms are manageable with no recent exacerbations. Most recent PFTs (2018) were normal.  - Continue PTA Breo Ellipta inhaler and PRN DuoNebs      CV  # Essential hypertension  - PTA lisinopril 10 mg daily has been HELD since 3/11 w/ soft BPs (90s-100s/50s-60s)      # Infrarenal abdominal aortic aneurysm  Noted incidentally on CT C/A/P (3/19/25), measuring 3.4 cm in diameter.  - Attention on follow-up imaging     RENAL/FEN  # Stage 2 CKD  Baseline Cr ~ 0.7. On admission Cr 0.77.  - Continue to trend on daily labs and encourage PO hydration     NEURO  # Chronic migraine w/o aura  # Chronic headaches  Present since childhood. Reportedly triggered  by neck manipulation/movement. Reportedly well-managed with regimen below. Headaches occurring throughout admission with daily APAP use, also chronic and managed with APAP PRN at home. Patient notes that her headaches throughout admission have been consistent with her typical daily headaches with no reported changes in character, quality, location, severity, or frequency. She denies new associated neurological changes. Considered LP to exclude CNS leukemia, but given this reassuring history and absence of other indications for LP (no hyperleukocytosis, no monocytic phenotype, no FLT3 mutation, etc), this was ultimately deferred.  - APAP PRN  - Continue PTA sumatriptan and cyclobenzaprine PRN  - Could reconsider need for LP if headache worsens/changes in character or quality in the future     MISC  # Maculopapular rash, improved  Noted on 3/25. Pruritic, localized to the neck, right shoulder, and flexural area behind the right knee. Clinically, affected areas consist of coalescing erythematous macules with scattered papules. No obvious vesicles or bullae. DDx includes contact dermatitis (favored, possibly due to CHG wipes) versus atopic dermatitis (flexural areas raise suspicion for this) versus drug eruption versus other. Improved with triamcinolone ointment.  - Continue triamcinolone ointment 0.1% BID PRN to the affected areas  - Okay to defer CHG wipes in the future, given concern for contact dermatitis  - Monitor clinically     # BROOKS  # MDD  # At risk for adjustment disorder  Patient reports good control of anxiety/depressive symptoms prior to admission. Did note feeling overwhelmed by new diagnosis. Offered health psychology or cordelia, which she declined.  - Readdress health psych consult as indicated  - Continue PTA paroxetine   - Atarax as needed     # Tobacco use disorder  Has smoked since age 16, 1.5 ppd (roughly 65 pack years). Attempting to cut back as recently as 01/2025 to 0.5 ppd. We discussed the  risks of smoking during induction chemotherapy including increased risk for infection in setting of severe neutropenia that could further complicate course, placing her at risk for severe complications that could be life-threatening or even fatal.  - Encourage smoking cessation; patient continues to smoke at this time, despite understanding/acknowledgement of the risks.  - Trialed nicotine patch, then self-discontinued after reporting that she felt the nicotine patch precipitated an anxiety attack/episode of chest pain on 3/9. Offered a lower dose versus alternative form of NRT, which patient declined.    - Would continue posaconazole ppx, regardless of ANC, given high risk for fungal pulmonary infection in the setting of ongoing tobacco abuse.     # Degenerative disc disease  Appears likely multi-level; no MRI available for review, though note that patient has previously had epidural injections at L3-4 and L5-S1 (2016). Reported taking gabapentin 400 mg TID PRN prior to admission. Mild exacerbation noted 3/29; improved with resumption of gabapentin.  - Continue gabapentin 400 mg TID     Chronic Problems:  # Vitamin D deficiency - Continue PTA vit D supplement  # Seasonal allergies - Claritin 10 mg daily  # Rheumatoid arthritis - Patient reported trying treatment though was unable to tolerate well. Managed with Tylenol PRN. Most recent RF >650 (2/10/25). JI negative.    # Prediabetes - Last A1c 6.0 x12/9/24. Has been managing with lifestyle modifications.   # Mixed hyperlipidemia - Lipid panel has remained at goal, 1/14/25 panel w/ cholesterol 163, , LDL 92, HDL 45. - Held PTA atorvastatin on admission due to chemotherapy interactions. Rotate to rosuvastatin 20 mg daily on discharge for better drug-drug interaction profile.   # H/o aortic stenosis - Patient noted on admission longstanding history of aortic stenosis. Pre-chemo echo w/ EF 60-65% and mild aortic stenosis and insufficiency. No previous echo to  compare.      Resolved Hospital Problems:  # Mild hyponatremia, resolved - New mild hyponatremia noted 3/13 with Na 135 ? 130. Asymptomatic. Now resolved.  # Non-radiating chest pain, resolved - for details see note from 3/21 and prior   # Urinary frequency + Dysuria, resolved - On admission, patient noted longstanding history of urinary frequency though new mild dysuria. UA (2/26) negative, UC with no growth. Symptoms have since resolved.  # R AOM with purulent effusion and small L TM perforation, resolved - ENT consulted, appreciate recs. Completed 7-day course Levaquin 750mg daily and 5 day course of floxin drops to left ear for perforation     Clinically Significant Risk Factors               # Hypoalbuminemia: Lowest albumin = 3.2 g/dL at 3/24/2025  4:59 AM, will monitor as appropriate     # Hypertension: Noted on problem list             # Moderate Malnutrition: based on nutrition assessment and treatment provided per dietitian's recommendations.    # Financial/Environmental Concerns: none  # Asthma: noted on problem list       Staffed with Dr. Byrd.    90 MINUTES SPENT BY ME on the date of service doing chart review, history, exam, documentation & further activities per the note.      Chelsie Davila PA-C  Hematology/Oncology  Pager: #[phone]    Code Status   DNR / DNI    Primary Care Physician   Physician No Ref-Primary    Physical Exam   Vital Signs with Ranges  Temp:  [97.4  F (36.3  C)-98.6  F (37  C)] 97.8  F (36.6  C)  Pulse:  [73-87] 87  Resp:  [16] 16  BP: (123-163)/(73-91) 137/73  SpO2:  [94 %-97 %] 94 %  183 lbs 3.2 oz    Constitutional: Pleasant and cooperative female, in NAD. Alert, interactive, non-toxic. Smiling and conversational.  HEENT: NC/AT, sclera clear, conjunctiva normal, MMM without lesion, thrush, or abscess; poor dentition.  Respiratory: No increased work of breathing. Breath sounds mildly diminished to the lung bases bilaterally. No wheezing.   Cardiovascular: RRR, systolic  "murmur. No peripheral edema.   GI: Normal bowel sounds. Abdomen is obese, soft, non-distended and non-tender to palpation.  Skin: Warm, dry, well-perfused.   Musculoskeletal: Diminished muscle bulk, no gross deformities.  Neurologic: A&O. Answers questions appropriately, speech normal. Normal gait. Moves all extremities spontaneously.  Psychiatric: Normal mood and affect.    Discharge Disposition   Discharged to home  Condition at discharge: Stable    Consultations This Hospital Stay   OCCUPATIONAL THERAPY ADULT IP CONSULT  PHYSICAL THERAPY ADULT IP CONSULT  PHARMACY LIAISON FOR MEDICATION COVERAGE CONSULT  NURSING TO CONSULT FOR VASCULAR ACCESS CARE IP CONSULT  ENT IP CONSULT  CARE MANAGEMENT / SOCIAL WORK IP CONSULT  VASCULAR ACCESS FOR PICC PLACEMENT ADULT IP CONSULT  SPEECH LANGUAGE PATH ADULT IP CONSULT  GI LUMINAL ADULT IP CONSULT  NURSING TO CONSULT FOR VASCULAR ACCESS CARE IP CONSULT    Discharge Orders      Adult Oncology/Hematology  Referral      Reason for your hospital stay    You were admitted for treatment of acute myeloid leukemia. You received initial chemotherapy (\"induction\" with the 7+3 regimen), and a post-treatment bone marrow biopsy showed you were in remission. You then proceeded with consolidation (\"HiDAC,\" Cycle 1) prior to being discharged from the hospital.     Activity    Your activity upon discharge: activity as tolerated     Tubes and Drains    Current Tubes and Drains:     PICC Line  Duration           PICC 03/05/25 Double Lumen Right Brachial vein medial chemotherapy 30   days              ADULT [facility] Specialty Follow-up and recommended labs and tests    An appointment for hospital follow up was requested for you. If it is not scheduled by the time you discharge you will be contacted with the date and time. You may call clinic to makes changes to this appointment if needed.    Already scheduled appointments are listed below.       Appointments on University and/or " [facility] (with [facility] or [employer] provider or service). Call [phone] if you haven't heard regarding these appointments within 7 days of discharge.     When to contact your care team    Please call the [facility] at [phone] if you develop temperature above 100.4, shortness of breath, chest pain, headaches, vision changes, bleeding, uncontrolled nausea, vomiting, diarrhea, pain, or any other signs or symptoms of concern. If you are concerned that your symptoms are life-threatening, don't hesitate to call 911 or go to the nearest Emergency Room.     Discharge Instructions    Antimicrobials:  - Continue taking preventative antimicrobials - acyclovir to prevent viral infections, levofloxacin to prevent bacterial infections, posaconazole to prevent fungal infections. These should all be started on discharge and continued until instructed to stop by your oncology team.  - Continue taking antireflux medications (Protonix, Pepcid, carafate, Tums) as ordered.  - Monitor closely at home for fevers or other signs of infection and bleeding, which are two of the most severe complications that can develop when your blood counts drop, like they are expected to after this chemotherapy cycle. If you have any concerns, please call the provided triage number or go to your nearest emergency department to be evaluated.     Diet    Follow this diet upon discharge: Regular     Check Out Appointment Request    Please schedule twice weekly labs/as needed transfusions at [facility] starting 4/2.   Please schedule follow up with Dr Harris at [facility] 4/7 or 4/8.     Infusion Appointment Request - Adult     Discharge Medications   Discharge Medication List as of 4/6/2025  9:40 AM        START taking these medications    Details   acyclovir (ZOVIRAX) 400 MG tablet Take 1 tablet (400 mg) by mouth 2 times daily., Disp-60 tablet, R-0, E-Prescribe       calcium carbonate (TUMS) 500 MG chewable tablet Take 1 tablet (500 mg) by mouth 3 times daily as needed for heartburn., Disp-90 tablet, R-0, E-Prescribe      famotidine (PEPCID) 20 MG tablet Take 1 tablet (20 mg) by mouth 2 times daily., Disp-60 tablet, R-3, E-Prescribe      hydrOXYzine HCl (ATARAX) 25 MG tablet Take 1-2 tablets (25-50 mg) by mouth every 6 hours as needed for anxiety or itching (adjuvant pain, sleep)., Disp-90 tablet, R-3, E-Prescribe      levofloxacin (LEVAQUIN) 250 MG tablet Take 1 tablet (250 mg) by mouth daily. Start on hospital discharge; continue through soo (low point in blood counts) until ANC >1.0, or as otherwise instructed by your oncology team., Disp-30 tablet, R-3, E-Prescribe      loratadine (CLARITIN) 10 MG tablet Take 1 tablet (10 mg) by mouth daily., Disp-30 tablet, R-0, E-Prescribe      pantoprazole (PROTONIX) 40 MG EC tablet Take 1 tablet (40 mg) by mouth 2 times daily (before meals)., Disp-60 tablet, R-3, E-Prescribe      posaconazole (NOXAFIL) 100 MG DR tablet Take 3 tablets (300 mg) by mouth every morning., Disp-90 tablet, R-3, E-Prescribe      prednisoLONE acetate (PRED FORTE) 1 % ophthalmic suspension Place 2 drops into both eyes 4 times daily. Please continue through Monday, 4/7., Disp-5 mL, R-0, E-Prescribe      prochlorperazine (COMPAZINE) 5 MG tablet Take 1 tablet (5 mg) by mouth every 6 hours as needed for nausea or vomiting., Disp-30 tablet, R-3, E-Prescribe      rosuvastatin (CRESTOR) 20 MG tablet Take 1 tablet (20 mg) by mouth daily., Disp-30 tablet, R-3, E-Prescribe      sucralfate (CARAFATE) 1 GM/10ML suspension Take 10 mLs (1 g) by mouth 4 times daily (before meals and nightly)., Disp-414 mL, R-0, E-Prescribe           CONTINUE these medications which have CHANGED    Details   albuterol (PROAIR HFA/PROVENTIL HFA/VENTOLIN HFA) 108 (90 Base) MCG/ACT inhaler Inhale 1-2 puffs into the lungs every 4 hours as needed for shortness of breath, wheezing or cough.,  Disp-18 g, R-4, E-PrescribePharmacy may dispense brand covered by insurance (Proair, or proventil or ventolin or generic albuterol inhaler)      cyclobenzaprine (FLEXERIL) 10 MG tablet Take 1 tablet (10 mg) by mouth at bedtime., Disp-30 tablet, R-3, E-Prescribe      gabapentin (NEURONTIN) 400 MG capsule Take 1 capsule (400 mg) by mouth 3 times daily., Disp-90 capsule, R-0, E-Prescribe      ondansetron (ZOFRAN ODT) 4 MG ODT tab Take 1-2 tablets (4-8 mg) by mouth every 8 hours as needed for nausea or vomiting., Disp-45 tablet, R-3, E-Prescribe      Vitamin D, Cholecalciferol, 25 MCG (1000 UT) CAPS Take 1,000 Units by mouth daily., Disp-90 capsule, R-1, E-Prescribe           CONTINUE these medications which have NOT CHANGED    Details   acetaminophen (TYLENOL) 500 MG tablet Take 1,000 mg by mouth 3 times daily., Historical      budesonide-formoterol (SYMBICORT) 80-4.5 MCG/ACT Inhaler Inhale 2 puffs into the lungs 2 times daily - Rinse mouth well after use to prevent Thrush, Disp-10.2 g, R-11, E-Prescribe      cyanocobalamin (VITAMIN B-12) 500 MCG tablet Take 500 mcg by mouth daily., Historical      fluticasone (FLONASE) 50 MCG/ACT nasal spray Spray 2 sprays into both nostrils 2 times daily., Disp-15.8 mL, R-0, E-PrescribeQS, 1 bottle      ipratropium - albuterol 0.5 mg/2.5 mg/3 mL (DUONEB) 0.5-2.5 (3) MG/3ML neb solution Take 1 vial (3 mLs) by nebulization every 4 hours as needed for shortness of breath, wheezing or cough, Disp-90 mL, R-11, E-Prescribe      order for DME Equipment being ordered: home neb set up with mask and tubingDisp-1 Device, R-0, Local Print      PARoxetine (PAXIL) 20 MG tablet TAKE 1 TABLET (20 MG) BY MOUTH EVERY MORNING, Disp-90 tablet, R-0, E-Prescribe      SUMAtriptan (IMITREX) 50 MG tablet Take 1 tablet (50 mg) by mouth at onset of headache for migraine May repeat in 2 hours. Max 4 tablets/24 hours., Disp-9 tablet, R-1, E-Prescribe           STOP taking these medications       aspirin (ASPIRIN  LOW DOSE) 81 MG EC tablet Comments:   Reason for Stopping:         atorvastatin (LIPITOR) 40 MG tablet Comments:   Reason for Stopping:         lisinopril (ZESTRIL) 10 MG tablet Comments:   Reason for Stopping:         omeprazole 20 MG tablet Comments:   Reason for Stopping:             Allergies   Allergies   Allergen Reactions    Adhesive Tape     Bee Pollen Swelling     Seasonal    Bee Venom Unknown    Folic Acid Itching    Pollen Extract      Seasonal    Amoxicillin Rash    Chlorhexidine Rash     After several weeks, started to develop rash on R neck down to chest and arm. Also noted on back of knees. Providers suggesting related to CHG as nothing else is new for pt.     Liquid Adhesive Rash    Meloxicam Rash    Nabumetone GI Disturbance     GI upset       Data   Most Recent 3 CBC's:  Recent Labs   Lab Test 04/06/25 0518 04/05/25  0519 04/04/25  0452   WBC 4.6 5.6 8.1   HGB 9.0* 7.9* 8.2*   MCV 94 98 95   * 1,489* 1,699*      Most Recent 3 BMP's:  Recent Labs   Lab Test 04/06/25 0518 04/05/25  0519 04/04/25  0452    140 139   POTASSIUM 4.4 4.7 4.5   CHLORIDE 102 101 99   CO2 27 28 28   BUN 21.8* 21.3* 12.5   CR 0.52 0.57 0.57   ANIONGAP 11 11 12   TOBIN 9.0 9.5 9.4   * 134* 149*     Most Recent 2 LFT's:  Recent Labs   Lab Test 04/06/25 0518 04/05/25  0519   AST 27 19   ALT 42 34   ALKPHOS 191* 192*   BILITOTAL 0.3 0.2     Most Recent INR's and Anticoagulation Dosing History:  Anticoagulation Dose History  More data exists         Latest Ref Rng & Units 3/21/2025 3/24/2025 3/26/2025 3/28/2025 3/31/2025 4/2/2025 4/4/2025   Recent Dosing and Labs   INR 0.85 - 1.15 1.08  1.13  1.11  1.19  1.14  1.12  1.11      Most Recent 3 Troponin's:No lab results found.  Most Recent Cholesterol Panel:  Recent Labs   Lab Test 01/14/25  1052   CHOL 163   LDL 92   HDL 45*   TRIG 130     Most Recent 6 Bacteria Isolates From Any Culture (See EPIC Reports for Culture Details):No lab results found.  Most Recent TSH,  T4 and A1c Labs:  Recent Labs   Lab Test 12/09/24  1513   A1C 6.0*     Results for orders placed or performed during the hospital encounter of 02/26/25   XR Chest Port 1 View    Narrative    Exam: XR CHEST PORT 1 VIEW  2/26/2025 8:48 PM     History:  Investigating any infectious etiology       Comparison:  1/23/2024    Findings: Single view of the chest.    Trachea is midline. The cardiomediastinal silhouette is stable and  within normal limits. No significant pleural effusion or pneumothorax.  Streaky bibasilar pulmonary opacities are mildly increased. The  visualized upper abdomen is unremarkable. No acute bony abnormality.      Impression    Impression: Streaky bibasilar opacities, likely atelectasis or edema.  No consolidation.    I have personally reviewed the examination and initial interpretation  and I agree with the findings.    MARILYN ADEN MD         SYSTEM ID:  [id number]   XR Chest Port 1 View    Narrative    Portable chest    INDICATION: Neutropenic fever    COMPARISON: To/26/25    FINDINGS: Scattered mild patchy densities in the lung bases slightly  more prominent may indicate edema, atelectasis or even  inflammation/infection a right PICC tip in the SVC. Atherosclerotic  calcification at the aortic knob. Heart size normal.      Impression    IMPRESSION: Scattered patchy densities especially in the lung bases  medically mild edema/atelectasis, recommend follow-up to clearing to  exclude infection.    PRABHAKAR JUAREZ MD         SYSTEM ID:  [id number]   CT Chest Abdomen w Contrast    Narrative    EXAM: CT chest, abdomen, and pelvis with intravenous contrast.  3/19/2025 4:53 PM    HISTORY: 58 yo F with progressive epigastric pain despite lifestyle  and medication management, please assess for underlying etiology    TECHNIQUE: Helical acquisition of image data was performed for the  chest, abdomen, and pelvis with intravenous contrast.    COMPARISON: 6/20/2013    FINDINGS:    Lines and tubes: Right  PICC tip in the right atrium.    CHEST:    Thyroid: Thyroid appears unremarkable.     Mediastinum: The heart is not enlarged. No pericardial effusion.  Thoracic aorta and main pulmonary artery are normal in caliber.     Lungs: Central airways are patent.  Mild bronchial wall thickening. No  suspicious pulmonary nodules or masses. No focal airspace  consolidation. Faint mosaic attenuation in the upper lobes. Minimal  groundglass and linear opacities in lung bases, likely atelectasis. No  pleural effusions. No pneumothorax.    Lower neck/axillae: No enlarged axillary, mediastinal, or hilar lymph  nodes by short axis criteria.       ABDOMEN/PELVIS:    Liver: No intrahepatic biliary dilatation. No focal hepatic mass.    Gallbladder/biliary tree: Cholecystectomy. The common bile duct is  within normal limits for    Pancreas: The pancreatic duct is not dilated.    Spleen: The spleen is not enlarged.    Adrenal glands: No adrenal nodules.    Kidneys/ureters: No hydronephrosis.     Bladder/pelvic organs: Surgically absent uterus and ovaries.    Bowel/mesentery: No dilated loops of small bowel or colon. Normal  appendix. No free air or free fluid. Surgical mesh in the anterior  right upper quadrant adjacent to the left hepatic lobe.    Esophagus/Stomach: Circumferential wall thickening in the distal  esophagus and at the gastroesophageal junction. Likely small hiatal  hernia.    Major vessels: Aneurysmal dilatation of of the infrarenal aorta,  measuring 3.4 cm.     Lymph nodes: No enlarged abdominal or pelvic lymph nodes by short axis  criteria.    Soft Tissues: Left flank implanted sacral nerve stimulator.    Bones:  No acute or suspicious osseous abnormality. Mild degenerative  changes of the spine. Bone marrow biopsy changes in the posterior  right iliac bone.        Impression    IMPRESSION:    1. Wall thickening/edema at the distal esophagus and gastroesophageal  junction, suggestive of esophagitis. Likely small hiatal  hernia.  2. Mild bronchial wall thickening and faint mosaic attenuation in the  lungs, which could represent bronchitis and small airways disease.  Minimal linear and patchy ground glass opacities in the lung bases are  favored to represent atelectasis, though infection remains a  consideration.  3. Fusiform infrarenal abdominal aortic aneurysm measuring up to 3.4  cm in diameter.    I have personally reviewed the examination and initial interpretation  and I agree with the findings.    ARNULFO TAN DO         SYSTEM ID:  [id number]   XR Chest Port 1 View    Narrative    Exam: XR CHEST PORT 1 VIEW, 4/1/2025 6:37 AM    Indication: Fever workup    Comparison: 3/19/2025    Findings:   Normal portable AP chest radiograph.    Right PICC with the tip in SVC. Diffuse hazy pulmonary opacities. No  focal consolidations. No pneumothorax or pleural effusion.  Unremarkable cardiac silhouette. Arch calcifications.    No acute osseous abnormality.      Impression    Impression:   1. Diffuse hazy opacities suggestive of pulmonary edema with an  infectious process not excluded.  2. No consolidation.    I have personally reviewed the examination and initial interpretation  and I agree with the findings.    GEOVANI BURGOS MD         SYSTEM ID:  [id number]   XR Chest Port 1 View    Narrative    EXAM: XR CHEST PORT 1 VIEW 4/4/2025 10:23 AM    INDICATION: Increasing O2 needs    COMPARISON: Chest radiograph 4/1/2025, chest and abdomen CT 3/19/2025.    TECHNIQUE: Single AP view of the chest.    FINDINGS:   Right upper extremity PICC tip terminates at the cavoatrial junction.  Interstitial and airspace pulmonary opacities throughout the lungs are  relatively stable compared to previous exam. Cardiac silhouette  appears increased in size compared to previous radiograph. Calcific  atherosclerosis of the aortic arch. No pleural effusion or  pneumothorax. Bones and soft tissues are unchanged.      Impression    IMPRESSION:   1. Enlarged  cardiac silhouette compared to 2025 which may be  projectional. Consider chest CT versus echocardiography to evaluate  for pericardial effusion.  2. Stable mild-to-moderate pulmonary edema.    I have personally reviewed the examination and initial interpretation  and I agree with the findings.    JOSE ARIZA MD         SYSTEM ID:  [id number]   Echocardiogram Complete     Value    LVEF  60-65%    Narrative    [id number]  [id number]  [id number]  [id number]^BARTOLOME^CHRISTY     [facility]  Echocardiography Laboratory  [address] [place] [address]     Name: MAGGY ARNETT  MRN: [id number]  : 1965  Study Date: 2025 01:39 PM  Age: 59 yrs  Gender: Female  Patient Location: [facility]  Reason For Study: Chemo  Ordering Physician: CHRISTY MANCUSO  Referring Physician: JACKI CABELLO  Performed By: Emy Quigley RDCS     BSA: 1.9 m2  Height: 64 in  Weight: 176 lb  ______________________________________________________________________________  Procedure  Echocardiogram with two-dimensional, color and spectral Doppler.  ______________________________________________________________________________  Interpretation Summary  Global and regional left ventricular function is normal with an EF of 60-65%.  Global peak LV longitudinal strain is averaged at -24.4%. This is within  reported normal limits (normal <-18%).  Right ventricular function, chamber size, wall motion, and thickness are  normal.  Mild aortic stenosis and mild aortic insufficiency are present.  No pericardial effusion is present.  Previous study not available for comparison.  ______________________________________________________________________________  Left Ventricle  Global and regional left ventricular function is normal with an EF of 60-65%.  Left ventricular size is normal. Left ventricular wall thickness is normal.  Left ventricular diastolic function is normal. Diastolic Doppler  findings  (E/E' ratio and/or other parameters) suggest left ventricular filling  pressures are normal. Global peak LV longitudinal strain is averaged at -  24.4%. This is within reported normal limits (normal <-18%).     Right Ventricle  Right ventricular function, chamber size, wall motion, and thickness are  normal.     Atria  Both atria appear normal.     Mitral Valve  The mitral valve is normal.     Aortic Valve  Mild aortic insufficiency is present. Mild aortic stenosis is present. The  peak aortic velocity is 2.9 m/sec. The mean AoV pressure gradient is 18.0  mmHg. The aortic valve area is 2.0 cm^2, by the continuity equation.  Dimensionless velocity index is 0.64.     Tricuspid Valve  The tricuspid valve is normal. Trace tricuspid insufficiency is present. The  right ventricular systolic pressure is approximated at 19.5 mmHg plus the  right atrial pressure. Pulmonary artery systolic pressure is normal.     Pulmonic Valve  The valve leaflets are not well visualized. On Doppler interrogation, there is  no significant stenosis or regurgitation.     Vessels  The aorta root is normal. The thoracic aorta is normal. The pulmonary artery  cannot be assessed. The inferior vena cava was normal in size with preserved  respiratory variability. IVC diameter <2.1 cm collapsing >50% with sniff  suggests a normal RA pressure of 3 mmHg.     Pericardium  No pericardial effusion is present.     Compared to Previous Study  Previous study not available for comparison.  ______________________________________________________________________________  MMode/2D Measurements & Calculations  IVSd: 0.90 cm  LVIDd: 4.5 cm  LVIDs: 3.3 cm  LVPWd: 0.89 cm  FS: 26.7 %  LV mass(C)d: 133.7 grams  LV mass(C)dI: 72.2 grams/m2  Ao root diam: 2.9 cm  asc Aorta Diam: 3.2 cm  LVOT diam: 2.0 cm  LVOT area: 3.1 cm2  Ao root diam index Ht(cm/m): 1.8  Ao root diam index BSA (cm/m2): 1.6  Asc Ao diam index BSA (cm/m2): 1.7  Asc Ao diam index Ht(cm/m):  2.0  LA Volume (BP): 33.9 ml     LA Volume Index (BP): 18.3 ml/m2  RWT: 0.39     Doppler Measurements & Calculations  MV E max delbert: 87.4 cm/sec  MV A max delbert: 107.0 cm/sec  MV E/A: 0.82  MV dec time: 0.19 sec  Ao V2 max: 285.0 cm/sec  Ao max P.5 mmHg  Ao V2 mean: 188.2 cm/sec  Ao mean P.0 mmHg  Ao V2 VTI: 45.7 cm  MYRTLE(I,D): 2.2 cm2  MYRTLE(V,D): 2.0 cm2  AI P1/2t: 873.9 msec  LV V1 max P.3 mmHg  LV V1 max: 182.0 cm/sec  LV V1 VTI: 32.7 cm  SV(LVOT): 102.7 ml  SI(LVOT): 55.5 ml/m2  TR max delbert: 221.0 cm/sec  TR max P.5 mmHg  AV Delbert Ratio (DI): 0.64  MYRTLE Index (cm2/m2): 1.2     E/E' av.4  Lateral E/e': 7.9  Medial E/e': 11.0  RV S Delbert: 14.1 cm/sec     ______________________________________________________________________________  Report approved by: Manuel Robles MD on 2025 02:54 PM         Echo Complete     Value    LVEF  60-65%    Narrative    [id number]  [id number]  [id number]  [id number]^TERELL^MARIA C^ANGEL     [facility]  Echocardiography Laboratory  [address] [place] [id number]     Name: MAGGY ARNETT  MRN: [id number]  : 1965  Study Date: 2025 07:30 AM  Age: 59 yrs  Gender: Female  Patient Location: [facility]  Reason For Study: Cardiomegaly  Ordering Physician: MARIA C FIGUEREDO  Referring Physician: JACKI CABELLO  Performed By: Lexi Jackson RDCS     BSA: 1.9 m2  Height: 64 in  Weight: 178 lb  BP: 133/79 mmHg  ______________________________________________________________________________  Procedure  Echocardiogram with two-dimensional, color and spectral Doppler.  ______________________________________________________________________________  Interpretation Summary  Left ventricular size, wall motion and function are normal. The ejection  fraction is 60-65%.  Right ventricular function, chamber size, wall motion, and thickness are  normal.  Aortic valve sclerosis is present. Mild aortic insufficiency is present.  This  study was compared with the study from 25: Aortic valve gradients  are slightly lower. On comparison, prior elevated gradients appear to have  been due to higher-flow state rather than true valvular aortic stenosis.  ______________________________________________________________________________  Left Ventricle  Left ventricular size, wall motion and function are normal. The ejection  fraction is 60-65%.     Right Ventricle  Right ventricular function, chamber size, wall motion, and thickness are  normal.     Mitral Valve  The mitral valve is normal. Trace mitral insufficiency is present.     Aortic Valve  Aortic valve sclerosis is present. Mild aortic insufficiency is present.     Tricuspid Valve  The tricuspid valve is normal. Trace tricuspid insufficiency is present. The  right ventricular systolic pressure is approximated at 30.1 mmHg plus the  right atrial pressure.     Pulmonic Valve  The valve leaflets are not well visualized. Trace pulmonic insufficiency is  present.     Vessels  The aorta root is normal. Dilation of the inferior vena cava is present with  abnormal respiratory variation in diameter.     Pericardium  No pericardial effusion is present.     Compared to Previous Study  This study was compared with the study from 25 . Aortic valve gradients  are slightly lower. On comparison, prior elevated gradients appear to have  been due to higher-flow state rather than true valvular aortic stenosis.     ______________________________________________________________________________  MMode/2D Measurements & Calculations  IVSd: 0.95 cm  LVIDd: 4.8 cm  LVIDs: 3.5 cm  LVPWd: 1.00 cm  FS: 26.6 %     LV mass(C)d: 166.0 grams  LV mass(C)dI: 89.2 grams/m2  RWT: 0.41     Doppler Measurements & Calculations  Ao V2 max: 232.6 cm/sec  Ao max P.6 mmHg  Ao V2 mean: 146.5 cm/sec  Ao mean P.9 mmHg  Ao V2 VTI: 56.2 cm  TR max andrew: 274.3 cm/sec  TR max P.1 mmHg      ______________________________________________________________________________  Report approved by: Manuel Robles MD on 04/05/2025 09:16 AM

## 2025-04-07 ENCOUNTER — APPOINTMENT (OUTPATIENT)
Dept: LAB | Facility: OTHER | Age: 60
End: 2025-04-07
Attending: INTERNAL MEDICINE
Payer: MEDICARE

## 2025-04-07 ENCOUNTER — HOSPITAL ENCOUNTER (OUTPATIENT)
Dept: INFUSION THERAPY | Facility: OTHER | Age: 60
Discharge: HOME | End: 2025-04-07
Attending: INTERNAL MEDICINE
Payer: MEDICARE

## 2025-04-07 VITALS
TEMPERATURE: 98.2 F | SYSTOLIC BLOOD PRESSURE: 138 MMHG | HEART RATE: 78 BPM | DIASTOLIC BLOOD PRESSURE: 74 MMHG | RESPIRATION RATE: 18 BRPM

## 2025-04-07 DIAGNOSIS — C92.00 ACUTE MYELOID LEUKEMIA NOT HAVING ACHIEVED REMISSION (H): Primary | ICD-10-CM

## 2025-04-07 DIAGNOSIS — D53.9 MACROCYTIC ANEMIA: ICD-10-CM

## 2025-04-07 DIAGNOSIS — Z09 HOSPITAL DISCHARGE FOLLOW-UP: ICD-10-CM

## 2025-04-07 LAB
ALBUMIN SERPL BCG-MCNC: 3.7 G/DL (ref 3.5–5.2)
ALP SERPL-CCNC: 185 U/L (ref 40–150)
ALT SERPL W P-5'-P-CCNC: 56 U/L (ref 0–50)
ANION GAP SERPL CALCULATED.3IONS-SCNC: 11 MMOL/L (ref 7–15)
AST SERPL W P-5'-P-CCNC: 36 U/L (ref 0–45)
BASOPHILS # BLD AUTO: 0 10E3/UL (ref 0–0.2)
BASOPHILS NFR BLD AUTO: 0 %
BILIRUB SERPL-MCNC: 0.5 MG/DL
BUN SERPL-MCNC: 15 MG/DL (ref 6–20)
CALCIUM SERPL-MCNC: 9 MG/DL (ref 8.8–10.4)
CHLORIDE SERPL-SCNC: 98 MMOL/L (ref 98–107)
CREAT SERPL-MCNC: 0.57 MG/DL (ref 0.51–0.95)
EGFRCR SERPLBLD CKD-EPI 2021: >90 ML/MIN/1.73M2
EOSINOPHIL # BLD AUTO: 0 10E3/UL (ref 0–0.7)
EOSINOPHIL NFR BLD AUTO: 0 %
ERYTHROCYTE [DISTWIDTH] IN BLOOD BY AUTOMATED COUNT: 15.8 % (ref 10–15)
GLUCOSE SERPL-MCNC: 106 MG/DL (ref 70–99)
HCO3 SERPL-SCNC: 28 MMOL/L (ref 22–29)
HCT VFR BLD AUTO: 25.8 % (ref 35–47)
HGB BLD-MCNC: 8.7 G/DL (ref 11.7–15.7)
IMM GRANULOCYTES # BLD: 0 10E3/UL
IMM GRANULOCYTES NFR BLD: 1 %
LYMPHOCYTES # BLD AUTO: 0.4 10E3/UL (ref 0.8–5.3)
LYMPHOCYTES NFR BLD AUTO: 10 %
MCH RBC QN AUTO: 31.5 PG (ref 26.5–33)
MCHC RBC AUTO-ENTMCNC: 33.7 G/DL (ref 31.5–36.5)
MCV RBC AUTO: 94 FL (ref 78–100)
MONOCYTES # BLD AUTO: 0 10E3/UL (ref 0–1.3)
MONOCYTES NFR BLD AUTO: 0 %
NEUTROPHILS # BLD AUTO: 3.7 10E3/UL (ref 1.6–8.3)
NEUTROPHILS NFR BLD AUTO: 89 %
NRBC # BLD AUTO: 0 10E3/UL
NRBC BLD AUTO-RTO: 0 /100
PLATELET # BLD AUTO: 800 10E3/UL (ref 150–450)
POTASSIUM SERPL-SCNC: 3.8 MMOL/L (ref 3.4–5.3)
PROT SERPL-MCNC: 6.8 G/DL (ref 6.4–8.3)
RBC # BLD AUTO: 2.76 10E6/UL (ref 3.8–5.2)
SODIUM SERPL-SCNC: 137 MMOL/L (ref 135–145)
WBC # BLD AUTO: 4.2 10E3/UL (ref 4–11)

## 2025-04-07 PROCEDURE — 36415 COLL VENOUS BLD VENIPUNCTURE: CPT | Performed by: INTERNAL MEDICINE

## 2025-04-07 PROCEDURE — 82040 ASSAY OF SERUM ALBUMIN: CPT | Performed by: INTERNAL MEDICINE

## 2025-04-07 PROCEDURE — 250N000011 HC RX IP 250 OP 636: Mod: JZ | Performed by: STUDENT IN AN ORGANIZED HEALTH CARE EDUCATION/TRAINING PROGRAM

## 2025-04-07 PROCEDURE — 80053 COMPREHEN METABOLIC PANEL: CPT | Performed by: INTERNAL MEDICINE

## 2025-04-07 PROCEDURE — 85004 AUTOMATED DIFF WBC COUNT: CPT | Performed by: INTERNAL MEDICINE

## 2025-04-07 PROCEDURE — 85014 HEMATOCRIT: CPT | Performed by: INTERNAL MEDICINE

## 2025-04-07 PROCEDURE — 96372 THER/PROPH/DIAG INJ SC/IM: CPT | Performed by: STUDENT IN AN ORGANIZED HEALTH CARE EDUCATION/TRAINING PROGRAM

## 2025-04-07 RX ADMIN — PEGFILGRASTIM 6 MG: 6 INJECTION SUBCUTANEOUS at 10:49

## 2025-04-07 NOTE — NURSING NOTE
Infusion Nursing Note:  Alejandra Villegas presents today for Neulasta and lab.    Patient seen by provider today: No   present during visit today: Not Applicable.    Note: Patient here for neulasta injection post cytarabine infusion at on 4/3/25      Intravenous Access:  Labs drawn without difficulty.    Treatment Conditions:  Results reviewed, labs did NOT meet treatment parameters: no blood transfusion needed today   Lab Results   Component Value Date    WBC 4.2 04/07/2025    WBC 8.2 03/24/2020     Lab Results   Component Value Date    RBC 2.76 04/07/2025    RBC 4.39 03/24/2020     Lab Results   Component Value Date    HGB 8.7 04/07/2025    HGB 14.2 03/24/2020     Lab Results       .      Post Infusion Assessment:  Patient tolerated injection without incident.  Site patent and intact, free from redness, edema or discomfort.       Discharge Plan:   Discharge instructions reviewed with: Patient.  Patient and/or family verbalized understanding of discharge instructions and all questions answered.  Copy of AVS reviewed with patient and/or family.  Patient will return 4/10/25 for next appointment.  Patient discharged in stable condition accompanied by: self and Family.  Departure Mode: Ambulatory.      Steffany Bowen RN

## 2025-04-08 ENCOUNTER — TELEPHONE (OUTPATIENT)
Dept: PHARMACY | Facility: OTHER | Age: 60
End: 2025-04-08
Payer: MEDICARE

## 2025-04-08 ENCOUNTER — TRANSFERRED RECORDS (OUTPATIENT)
Dept: MULTI SPECIALTY CLINIC | Facility: CLINIC | Age: 60
End: 2025-04-08

## 2025-04-08 LAB — PAP SMEAR - HIM PATIENT REPORTED: NORMAL

## 2025-04-08 NOTE — TELEPHONE ENCOUNTER
MTM referral from: Transitions of Care (recent hospital discharge, TCU discharge, or ED visit)    MTM referral outreach attempt #1 on April 8, 2025 at 11:18 AM      Outcome: Spoke with patient declined    Use no clinic listed, USE [facility] (discharged 4/6)for the carrier/Plan on the flowsheet          Tamara Lyons [facility]  -[facility]  [phone]

## 2025-04-10 ENCOUNTER — TELEPHONE (OUTPATIENT)
Dept: ONCOLOGY | Facility: OTHER | Age: 60
End: 2025-04-10

## 2025-04-10 ENCOUNTER — LAB (OUTPATIENT)
Dept: LAB | Facility: OTHER | Age: 60
End: 2025-04-10
Attending: INTERNAL MEDICINE
Payer: MEDICARE

## 2025-04-10 DIAGNOSIS — C92.00 ACUTE MYELOID LEUKEMIA NOT HAVING ACHIEVED REMISSION (H): ICD-10-CM

## 2025-04-10 LAB
ALBUMIN SERPL BCG-MCNC: 3.8 G/DL (ref 3.5–5.2)
ALP SERPL-CCNC: 186 U/L (ref 40–150)
ALT SERPL W P-5'-P-CCNC: 41 U/L (ref 0–50)
ANION GAP SERPL CALCULATED.3IONS-SCNC: 11 MMOL/L (ref 7–15)
AST SERPL W P-5'-P-CCNC: 21 U/L (ref 0–45)
BASOPHILS # BLD AUTO: 0 10E3/UL (ref 0–0.2)
BASOPHILS NFR BLD AUTO: 5 %
BILIRUB SERPL-MCNC: 0.6 MG/DL
BUN SERPL-MCNC: 21.8 MG/DL (ref 6–20)
CALCIUM SERPL-MCNC: 9.2 MG/DL (ref 8.8–10.4)
CHLORIDE SERPL-SCNC: 102 MMOL/L (ref 98–107)
CREAT SERPL-MCNC: 0.58 MG/DL (ref 0.51–0.95)
EGFRCR SERPLBLD CKD-EPI 2021: >90 ML/MIN/1.73M2
EOSINOPHIL # BLD AUTO: 0 10E3/UL (ref 0–0.7)
EOSINOPHIL NFR BLD AUTO: 0 %
ERYTHROCYTE [DISTWIDTH] IN BLOOD BY AUTOMATED COUNT: 15.7 % (ref 10–15)
GLUCOSE SERPL-MCNC: 100 MG/DL (ref 70–99)
HCO3 SERPL-SCNC: 25 MMOL/L (ref 22–29)
HCT VFR BLD AUTO: 28.7 % (ref 35–47)
HGB BLD-MCNC: 9.3 G/DL (ref 11.7–15.7)
IMM GRANULOCYTES # BLD: 0 10E3/UL
IMM GRANULOCYTES NFR BLD: 1 %
LYMPHOCYTES # BLD AUTO: 0.5 10E3/UL (ref 0.8–5.3)
LYMPHOCYTES NFR BLD AUTO: 64 %
Lab: NORMAL
MCH RBC QN AUTO: 30.3 PG (ref 26.5–33)
MCHC RBC AUTO-ENTMCNC: 32.4 G/DL (ref 31.5–36.5)
MCV RBC AUTO: 94 FL (ref 78–100)
MONOCYTES # BLD AUTO: 0 10E3/UL (ref 0–1.3)
MONOCYTES NFR BLD AUTO: 3 %
NEUTROPHILS # BLD AUTO: 0.2 10E3/UL (ref 1.6–8.3)
NEUTROPHILS NFR BLD AUTO: 27 %
NRBC # BLD AUTO: 0 10E3/UL
NRBC BLD AUTO-RTO: 0 /100
PERFORMING LABORATORY: NORMAL
PLAT MORPH BLD: NORMAL
PLATELET # BLD AUTO: 416 10E3/UL (ref 150–450)
POTASSIUM SERPL-SCNC: 4.3 MMOL/L (ref 3.4–5.3)
PROT SERPL-MCNC: 7.1 G/DL (ref 6.4–8.3)
RBC # BLD AUTO: 3.07 10E6/UL (ref 3.8–5.2)
RBC MORPH BLD: NORMAL
SODIUM SERPL-SCNC: 138 MMOL/L (ref 135–145)
SPECIMEN STATUS: NORMAL
TEST NAME: NORMAL
WBC # BLD AUTO: 0.8 10E3/UL (ref 4–11)

## 2025-04-10 PROCEDURE — 82310 ASSAY OF CALCIUM: CPT | Mod: ZL

## 2025-04-10 PROCEDURE — 85004 AUTOMATED DIFF WBC COUNT: CPT | Mod: ZL

## 2025-04-10 PROCEDURE — 82435 ASSAY OF BLOOD CHLORIDE: CPT | Mod: ZL

## 2025-04-10 PROCEDURE — 36415 COLL VENOUS BLD VENIPUNCTURE: CPT | Mod: ZL

## 2025-04-10 PROCEDURE — 82374 ASSAY BLOOD CARBON DIOXIDE: CPT | Mod: ZL

## 2025-04-10 NOTE — TELEPHONE ENCOUNTER
DATE/TIME OF CALL RECEIVED FROM LAB:  04/10/25 at 10:40 AM   LAB TEST:  WBC, ANC  LAB VALUE:  0.8, 0.2  PROVIDER NOTIFIED?: Yes  PROVIDER NAME: Dr. Harris   DATE/TIME LAB VALUE REPORTED TO PROVIDER: 10:40 AM   MECHANISM OF PROVIDER NOTIFICATION:  phone note  PROVIDER RESPONSE: pending  Will ask Patient to continue antibiotics and neutropenic precautions-follow up with Dr. Harris tomorrow as scheduled.

## 2025-04-11 LAB
CULTURE HARVEST COMPLETE DATE: NORMAL

## 2025-04-12 LAB
ADDITIONAL COMMENTS: NORMAL
INTERPRETATION: NORMAL
ISCN: NORMAL
METHODS: NORMAL

## 2025-04-14 ENCOUNTER — LAB (OUTPATIENT)
Dept: LAB | Facility: OTHER | Age: 60
End: 2025-04-14
Attending: INTERNAL MEDICINE
Payer: MEDICARE

## 2025-04-14 ENCOUNTER — HOSPITAL ENCOUNTER (OUTPATIENT)
Dept: CT IMAGING | Facility: OTHER | Age: 60
Discharge: HOME | End: 2025-04-14
Attending: NURSE PRACTITIONER
Payer: MEDICARE

## 2025-04-14 ENCOUNTER — CARE COORDINATION (OUTPATIENT)
Dept: TRANSPLANT | Facility: CLINIC | Age: 60
End: 2025-04-14

## 2025-04-14 ENCOUNTER — PATIENT OUTREACH (OUTPATIENT)
Dept: CARE COORDINATION | Facility: CLINIC | Age: 60
End: 2025-04-14

## 2025-04-14 ENCOUNTER — TELEPHONE (OUTPATIENT)
Dept: ONCOLOGY | Facility: OTHER | Age: 60
End: 2025-04-14

## 2025-04-14 ENCOUNTER — OFFICE VISIT (OUTPATIENT)
Dept: FAMILY MEDICINE | Facility: OTHER | Age: 60
End: 2025-04-14
Attending: INTERNAL MEDICINE
Payer: MEDICARE

## 2025-04-14 VITALS
RESPIRATION RATE: 20 BRPM | DIASTOLIC BLOOD PRESSURE: 80 MMHG | WEIGHT: 181 LBS | SYSTOLIC BLOOD PRESSURE: 132 MMHG | TEMPERATURE: 98.3 F | HEART RATE: 86 BPM | OXYGEN SATURATION: 100 % | BODY MASS INDEX: 30.16 KG/M2 | HEIGHT: 65 IN

## 2025-04-14 DIAGNOSIS — C92.00 ACUTE MYELOID LEUKEMIA NOT HAVING ACHIEVED REMISSION (H): ICD-10-CM

## 2025-04-14 DIAGNOSIS — M25.512 ACUTE PAIN OF BOTH SHOULDERS: ICD-10-CM

## 2025-04-14 DIAGNOSIS — R39.9 UTI SYMPTOMS: ICD-10-CM

## 2025-04-14 DIAGNOSIS — C92.00 ACUTE MYELOID LEUKEMIA NOT HAVING ACHIEVED REMISSION (H): Primary | ICD-10-CM

## 2025-04-14 DIAGNOSIS — T45.1X5A PANCYTOPENIA DUE TO ANTINEOPLASTIC CHEMOTHERAPY: ICD-10-CM

## 2025-04-14 DIAGNOSIS — M54.50 ACUTE BILATERAL LOW BACK PAIN WITHOUT SCIATICA: Primary | ICD-10-CM

## 2025-04-14 DIAGNOSIS — M25.511 ACUTE PAIN OF BOTH SHOULDERS: ICD-10-CM

## 2025-04-14 DIAGNOSIS — D61.810 PANCYTOPENIA DUE TO ANTINEOPLASTIC CHEMOTHERAPY: ICD-10-CM

## 2025-04-14 LAB
ALBUMIN SERPL BCG-MCNC: 3.8 G/DL (ref 3.5–5.2)
ALBUMIN UR-MCNC: NEGATIVE MG/DL
ALP SERPL-CCNC: 189 U/L (ref 40–150)
ALT SERPL W P-5'-P-CCNC: 50 U/L (ref 0–50)
ANION GAP SERPL CALCULATED.3IONS-SCNC: 12 MMOL/L (ref 7–15)
APPEARANCE UR: CLEAR
AST SERPL W P-5'-P-CCNC: 35 U/L (ref 0–45)
BASOPHILS # BLD MANUAL: 0 10E3/UL (ref 0–0.2)
BASOPHILS NFR BLD MANUAL: 2 %
BILIRUB SERPL-MCNC: 0.3 MG/DL
BILIRUB UR QL STRIP: NEGATIVE
BUN SERPL-MCNC: 9.8 MG/DL (ref 6–20)
CALCIUM SERPL-MCNC: 9.2 MG/DL (ref 8.8–10.4)
CHLORIDE SERPL-SCNC: 104 MMOL/L (ref 98–107)
COLOR UR AUTO: YELLOW
CREAT SERPL-MCNC: 0.56 MG/DL (ref 0.51–0.95)
EGFRCR SERPLBLD CKD-EPI 2021: >90 ML/MIN/1.73M2
EOSINOPHIL # BLD MANUAL: 0.1 10E3/UL (ref 0–0.7)
EOSINOPHIL NFR BLD MANUAL: 5 %
ERYTHROCYTE [DISTWIDTH] IN BLOOD BY AUTOMATED COUNT: 15.1 % (ref 10–15)
GLUCOSE SERPL-MCNC: 120 MG/DL (ref 70–99)
GLUCOSE UR STRIP-MCNC: NEGATIVE MG/DL
HCO3 SERPL-SCNC: 25 MMOL/L (ref 22–29)
HCT VFR BLD AUTO: 24.4 % (ref 35–47)
HGB BLD-MCNC: 8.1 G/DL (ref 11.7–15.7)
HGB UR QL STRIP: ABNORMAL
KETONES UR STRIP-MCNC: NEGATIVE MG/DL
LEUKOCYTE ESTERASE UR QL STRIP: NEGATIVE
LYMPHOCYTES # BLD MANUAL: 0.7 10E3/UL (ref 0.8–5.3)
LYMPHOCYTES NFR BLD MANUAL: 58 %
MCH RBC QN AUTO: 30.5 PG (ref 26.5–33)
MCHC RBC AUTO-ENTMCNC: 33.2 G/DL (ref 31.5–36.5)
MCV RBC AUTO: 92 FL (ref 78–100)
MONOCYTES # BLD MANUAL: 0.2 10E3/UL (ref 0–1.3)
MONOCYTES NFR BLD MANUAL: 18 %
MYELOCYTES # BLD MANUAL: 0 10E3/UL
MYELOCYTES NFR BLD MANUAL: 4 %
NEUTROPHILS # BLD MANUAL: 0.2 10E3/UL (ref 1.6–8.3)
NEUTROPHILS NFR BLD MANUAL: 13 %
NITRATE UR QL: NEGATIVE
PH UR STRIP: 6.5 [PH] (ref 5–9)
PLAT MORPH BLD: NORMAL
PLATELET # BLD AUTO: 51 10E3/UL (ref 150–450)
POTASSIUM SERPL-SCNC: 3.5 MMOL/L (ref 3.4–5.3)
PROT SERPL-MCNC: 6.8 G/DL (ref 6.4–8.3)
RBC # BLD AUTO: 2.66 10E6/UL (ref 3.8–5.2)
RBC MORPH BLD: NORMAL
RBC URINE: 1 /HPF
SODIUM SERPL-SCNC: 141 MMOL/L (ref 135–145)
SP GR UR STRIP: 1.01 (ref 1–1.03)
SQUAMOUS EPITHELIAL: 2 /HPF
UROBILINOGEN UR STRIP-MCNC: NORMAL MG/DL
WBC # BLD AUTO: 1.2 10E3/UL (ref 4–11)
WBC URINE: <1 /HPF

## 2025-04-14 PROCEDURE — G0463 HOSPITAL OUTPT CLINIC VISIT: HCPCS

## 2025-04-14 PROCEDURE — 85027 COMPLETE CBC AUTOMATED: CPT | Mod: ZL

## 2025-04-14 PROCEDURE — 3075F SYST BP GE 130 - 139MM HG: CPT | Performed by: NURSE PRACTITIONER

## 2025-04-14 PROCEDURE — 71260 CT THORAX DX C+: CPT

## 2025-04-14 PROCEDURE — 82310 ASSAY OF CALCIUM: CPT | Mod: ZL

## 2025-04-14 PROCEDURE — 82040 ASSAY OF SERUM ALBUMIN: CPT | Mod: ZL

## 2025-04-14 PROCEDURE — 85007 BL SMEAR W/DIFF WBC COUNT: CPT | Mod: ZL

## 2025-04-14 PROCEDURE — 250N000011 HC RX IP 250 OP 636: Performed by: NURSE PRACTITIONER

## 2025-04-14 PROCEDURE — 99213 OFFICE O/P EST LOW 20 MIN: CPT | Performed by: NURSE PRACTITIONER

## 2025-04-14 PROCEDURE — 250N000009 HC RX 250: Performed by: NURSE PRACTITIONER

## 2025-04-14 PROCEDURE — 81001 URINALYSIS AUTO W/SCOPE: CPT | Mod: ZL | Performed by: NURSE PRACTITIONER

## 2025-04-14 PROCEDURE — 36415 COLL VENOUS BLD VENIPUNCTURE: CPT | Mod: ZL

## 2025-04-14 PROCEDURE — 1125F AMNT PAIN NOTED PAIN PRSNT: CPT | Performed by: NURSE PRACTITIONER

## 2025-04-14 PROCEDURE — 3079F DIAST BP 80-89 MM HG: CPT | Performed by: NURSE PRACTITIONER

## 2025-04-14 RX ORDER — IOPAMIDOL 755 MG/ML
104 INJECTION, SOLUTION INTRAVASCULAR ONCE
Status: COMPLETED | OUTPATIENT
Start: 2025-04-14 | End: 2025-04-14

## 2025-04-14 RX ADMIN — SODIUM CHLORIDE 60 ML: 9 INJECTION, SOLUTION INTRAVENOUS at 10:19

## 2025-04-14 RX ADMIN — IOPAMIDOL 104 ML: 755 INJECTION, SOLUTION INTRAVENOUS at 10:17

## 2025-04-14 ASSESSMENT — ENCOUNTER SYMPTOMS
SORE THROAT: 0
CARDIOVASCULAR NEGATIVE: 1
NECK PAIN: 1
ARTHRALGIAS: 1
NEUROLOGICAL NEGATIVE: 1
BACK PAIN: 1
PSYCHIATRIC NEGATIVE: 1
ENDOCRINE NEGATIVE: 1
HEMATOLOGIC/LYMPHATIC NEGATIVE: 1
FEVER: 0
ACTIVITY CHANGE: 1
ABDOMINAL PAIN: 1
EYES NEGATIVE: 1
FATIGUE: 0
COUGH: 0
APPETITE CHANGE: 1

## 2025-04-14 ASSESSMENT — PAIN SCALES - GENERAL: PAINLEVEL_OUTOF10: SEVERE PAIN (10)

## 2025-04-14 NOTE — PROGRESS NOTES
[facility] BMT and Cell Therapy Program  RN Coordinator Pre-Visit Documentation      Alejandra Villegas is a 59 year old female who has been referred to the [facility] BMT and Cell Therapy Program for hematopoietic cell transplant or immune effector cell therapy.      Referring MD Name: Dr. Samina Harris    Reason for referral: AML    Link to BMT & CT Program Algorithms      For allos only:    Previous HLA typing? Yes    Previous formal donor search? No    PRA needed? Yes    CMV IGG needed? Yes    and ABO needed? No - in chart    Potential family donors to type? Unknown    Need URD consents? Yes      All relevant clinical notes, labs, imaging, and pathology may be reviewed in Epic Bookmarks under name: Landy Chau      Patient Care Team         Relationship Specialty Notifications Start End    No Ref-Primary, Physician PCP - General   2/20/25     Fax: [phone]         Rl Nathan DPM MD Podiatry  4/29/22     Phone: [phone] Fax: [phone]         [address] [place] [address]    Rl Nathan DPM Assigned Musculoskeletal Provider   5/1/22     Phone: [phone] Fax: [phone]         [facility] [address] [facility] [address]    Dipak Welsh MD MD Family Medicine  8/31/22     Phone: [phone] Fax: [phone]         [address] [place] [address]    Rl Nathan DPM MD Podiatry  8/31/22     Phone: [phone] Fax: [phone]         [address] [place] [address]    Rl Nathan DPM MD Podiatry  9/22/22     Phone: [phone] Fax: [phone]         [address] [place] [address]    Rl Nathan DPM MD Podiatry  10/10/22     Phone: [phone] Fax: [phone]         [address] [place] [address]    Leonard Ambriz APRN CNP Assigned PCP   2/23/25     Phone: [phone] Fax: [phone]         [address] [place] [address]    Samina Harris MD Assigned  Cancer Care Provider   2/23/25     Phone: [phone] Fax: [phone]          [facility] [address]    Lisha Winkler, RN Specialty Care Coordinator Hematology & Oncology Admissions 2/24/25     Consuelo Lozano, RN Lead Care Coordinator Primary Care - CC Admissions 4/14/25               Landy Chau, RN

## 2025-04-14 NOTE — NURSING NOTE
"Chief Complaint   Patient presents with    Back Pain     Since 1130 last night    Shoulder Pain     Last night - both shoulders from neck down     Patient tx with rx of meds  Newly diagnosed leukemia patient.  Oncology recommended evaluation today in   Blood draw this am in clinic.    Initial /80 (BP Location: Left arm, Patient Position: Sitting, Cuff Size: Adult Regular)   Pulse 86   Temp 98.3  F (36.8  C) (Tympanic)   Resp 20   Ht 1.651 m (5' 5\")   Wt 82.1 kg (181 lb)   LMP 01/01/2007 (Approximate)   SpO2 100%   BMI 30.12 kg/m   Estimated body mass index is 30.12 kg/m  as calculated from the following:    Height as of this encounter: 1.651 m (5' 5\").    Weight as of this encounter: 82.1 kg (181 lb).     Advance Care Directive on file? y    FOOD SECURITY SCREENING QUESTIONS:    The next two questions are to help us understand your food security.  If you are feeling you need any assistance in this area, we have resources available to support you today.    Hunger Vital Signs:  Within the past 12 months we worried whether our food would run out before we got money to buy more. Never  Within the past 12 months the food we bought just didn't last and we didn't have money to get more. Never  Shira Jackson LPN,LPN on 4/14/2025 at 9:14 AM      Shira Jackson LPN     "

## 2025-04-14 NOTE — TELEPHONE ENCOUNTER
DATE/TIME OF CALL RECEIVED FROM LAB:  04/14/25 at 9:18 AM   LAB TEST:  ANC  LAB VALUE:  0.2  PROVIDER NOTIFIED?: Yes  PROVIDER NAME: Magalis Lopez APRN CNP    DATE/TIME LAB VALUE REPORTED TO PROVIDER: 9:19 AM   MECHANISM OF PROVIDER NOTIFICATION:  telephone message  PROVIDER RESPONSE: pending    Patient is continuing to follow neutropenic precautions. She is going to rapid clinic/ED as advised for her pain.  Lisha Winkler RN...........4/14/2025 9:20 AM

## 2025-04-14 NOTE — PROGRESS NOTES
Clinic Care Coordination Contact    Situation: Patient chart reviewed by care coordinator.    Background: Patient is at [facility] today for assessment for a bone marrow transplant. The oncology and transplant care coordinators appear to be connected with patient. Oncology had also given our clinic team a referral.       Plan/Recommendations: I will touch base when she is back home to see if any community resources would be beneficial. It appears she needs a new primary care provider.   Consuelo Lozano RN on 4/16/2025 at 4:18 PM

## 2025-04-14 NOTE — PROGRESS NOTES
Alejandra Villegas  1965    ASSESSMENT/PLAN      Presents to [facility] with concern for bilateral neck and shoulder pain with right lower back pain.  Patient has had no saddle paresthesias, radiation of pain or numbness or tingling.  Patient does have history of left sciatica pain, this has been stable and not active at this time.  Has not had fever or chills.  No urinary changes.  No cough or congestion.  Patient has a history of AML, recent admission for chemotherapy, counts have been variable.  No bleeding, bruising or other sign of blood loss.  Given patient's history obtain CT chest abdomen pelvis, which was negative for abnormality.  Urinalysis negative for infection.  Patient likely has musculoskeletal pain, she will use her muscle relaxers at home.  Follow-up in ER if symptoms worsen or do not improve in the next 48 hours.  Patient's vitals are stable and she appears nontoxic.        1. Acute bilateral low back pain without sciatica (Primary)  2. UTI symptoms  3. Acute pain of both shoulders  4. Pancytopenia due to antineoplastic chemotherapy  5. 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  Will treat for muscle pain with at home muscle relaxers.  - May use over-the-counter Tylenol or ibuprofen PRN  - Follow up as needed for new or worsening symptoms        *A shared decision making model was used.   *Patient and/or associated parties understood and were agreeable to treatment plan.   *Plan and all results were discussed.   *Explanation of diagnosis, treatment options and risk and benefits of medications reviewed with patient.    *Time was taken to answer all questions.   *Red flags symptoms were discussed and patient was advised when they should return for reevaluation or for prompt emergency evaluation.   *Patient was given verbal and written  instructions on plan of care. Instructions were printed or are available on Mychart on electronic AVS.   *We discussed potential side effects of any prescribed or recommended therapies, as well as expectations for response to treatments.  *Patient discharged in stable condition    Jyothi Villalobos CNP  [facility]    SUBJECTIVE  CHIEF COMPLAINT/ REASON FOR VISIT  Patient presents with:  Back Pain: Since 1130 last night  Shoulder Pain: Last night - both shoulders from neck down     HISTORY OF PRESENT ILLNESS  Alejandra Villegas is a pleasant 59 year old female presents to [facility] today with concern for bilateral neck and shoulder pain with right lower back pain.  Patient has had no saddle paresthesias, radiation of pain or numbness or tingling.  Patient does have history of left sciatica pain, this has been stable and not active at this time.  Has not had fever or chills.  No urinary changes.  No cough or congestion.  Patient has a history of AML, recent admission for chemotherapy, counts have been variable.  No bleeding, bruising or other sign of blood loss.        I have reviewed the nursing notes.  I have reviewed allergies, medication list, problem list, and past medical history.    REVIEW OF SYSTEMS  Review of Systems   Constitutional:  Positive for activity change and appetite change. Negative for fatigue and fever.   HENT:  Negative for congestion and sore throat.    Eyes: Negative.    Respiratory:  Negative for cough.    Cardiovascular: Negative.    Gastrointestinal:  Positive for abdominal pain.   Endocrine: Negative.    Genitourinary: Negative.    Musculoskeletal:  Positive for arthralgias, back pain and neck pain.   Skin: Negative.    Neurological: Negative.    Hematological: Negative.    Psychiatric/Behavioral: Negative.     All other systems reviewed and are negative.       VITAL SIGNS  Vitals:    04/14/25 0913   BP: 132/80   BP Location: Left arm   Patient Position: Sitting  "  Cuff Size: Adult Regular   Pulse: 86   Resp: 20   Temp: 98.3  F (36.8  C)   TempSrc: Tympanic   SpO2: 100%   Weight: 82.1 kg (181 lb)   Height: 1.651 m (5' 5\")      Body mass index is 30.12 kg/m .      OBJECTIVE  PHYSICAL EXAM  Physical Exam  Vitals and nursing note reviewed.   Constitutional:       Appearance: Normal appearance.   HENT:      Head: Normocephalic.      Right Ear: Tympanic membrane normal.      Left Ear: Tympanic membrane normal.      Nose: Nose normal.      Mouth/Throat:      Mouth: Mucous membranes are moist.   Eyes:      Pupils: Pupils are equal, round, and reactive to light.   Cardiovascular:      Rate and Rhythm: Normal rate and regular rhythm.      Pulses: Normal pulses.      Heart sounds: Normal heart sounds.   Pulmonary:      Effort: Pulmonary effort is normal.      Breath sounds: Normal breath sounds.   Abdominal:      Palpations: Abdomen is soft.      Tenderness: There is no right CVA tenderness or left CVA tenderness.   Musculoskeletal:         General: Tenderness present. No swelling, deformity or signs of injury. Normal range of motion.      Cervical back: Normal range of motion.      Comments: Patient with bilateral shoulder and neck pain, worse to palpation.  Patient has right lower back pain, worse with movement.  No CVA tenderness.  No neurological changes, no saddle paresthesias, no numbness or tingling.   Skin:     General: Skin is warm and dry.      Capillary Refill: Capillary refill takes less than 2 seconds.      Coloration: Skin is not jaundiced.      Findings: No bruising, erythema or rash.   Neurological:      General: No focal deficit present.      Mental Status: She is alert and oriented to person, place, and time.   Psychiatric:         Mood and Affect: Mood normal.              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 Sagittal and coronal reformatted  images were reviewed.    Radiation dose for this scan was reduced using automated exposure  control, adjustment of the mA and/or kV according to patient size, or  iterative reconstruction technique.    COMPARISON:  Chest x-ray 4/4/2025, CT 3/19/2025    FINDINGS:      Chest:    Heart size is normal.  The thoracic aorta is normal in size and  course, with some atherosclerotic wall calcifications.     No lymphadenopathy in the chest.    There are mild emphysematous changes in both lungs. No suspicious lung  nodule or mass. A 4 mm nodule in the right lower lobe is stable.         Abdomen/pelvis:    The liver, spleen, pancreas and adrenal glands are unremarkable. There  has been prior cholecystectomy.    The kidneys are intact.     The bowel is normal in caliber. The appendix is not seen.     There is stable, 3.2 cm aneurysmal dilatation of the abdominal aorta.  Scattered atherosclerotic plaque is present.    No free fluid, free air or adenopathy is present.      No suspicious osseous lesions are identified.      Impression    IMPRESSION:    1. No acute findings in the abdomen or pelvis. Interval resolution of  distal esophageal wall thickening.  2. Unchanged, 3.2 cm abdominal aortic aneurysm.  3. Stable 4 mm right lower lobe pulmonary nodule.  4. Mild emphysema.    PERRI CONTRERAS MD         SYSTEM ID:  [id number]   UA with Microscopic reflex to Culture     Status: Abnormal    Specimen: Urine, Clean Catch   Result Value Ref Range    Color Urine Yellow Colorless, Straw, Light Yellow, Yellow     Appearance Urine Clear Clear    Glucose Urine Negative Negative mg/dL    Bilirubin Urine Negative Negative    Ketones Urine Negative Negative mg/dL    Specific Gravity Urine 1.006 1.000 - 1.030    Blood Urine Trace (A) Negative    pH Urine 6.5 5.0 - 9.0    Protein Albumin Urine Negative Negative mg/dL    Urobilinogen Urine Normal Normal mg/dL    Nitrite Urine Negative Negative    Leukocyte Esterase Urine Negative Negative    RBC Urine 1 <=2 /HPF    WBC Urine <1 <=5 /HPF    Squamous Epithelials Urine 2 (H) <=1 /HPF    Narrative    Urine Culture not indicated   Results for orders placed or performed in visit on 04/14/25   Comprehensive metabolic panel     Status: Abnormal   Result Value Ref Range    Sodium 141 135 - 145 mmol/L    Potassium 3.5 3.4 - 5.3 mmol/L    Carbon Dioxide (CO2) 25 22 - 29 mmol/L    Anion Gap 12 7 - 15 mmol/L    Urea Nitrogen 9.8 6.0 - 20.0 mg/dL    Creatinine 0.56 0.51 - 0.95 mg/dL    GFR Estimate >90 >60 mL/min/1.73m2    Calcium 9.2 8.8 - 10.4 mg/dL    Chloride 104 98 - 107 mmol/L    Glucose 120 (H) 70 - 99 mg/dL    Alkaline Phosphatase 189 (H) 40 - 150 U/L    AST 35 0 - 45 U/L    ALT 50 0 - 50 U/L    Protein Total 6.8 6.4 - 8.3 g/dL    Albumin 3.8 3.5 - 5.2 g/dL    Bilirubin Total 0.3 <=1.2 mg/dL   CBC with platelets and differential     Status: Abnormal   Result Value Ref Range    WBC Count 1.2 (L) 4.0 - 11.0 10e3/uL    RBC Count 2.66 (L) 3.80 - 5.20 10e6/uL    Hemoglobin 8.1 (L) 11.7 - 15.7 g/dL    Hematocrit 24.4 (L) 35.0 - 47.0 %    MCV 92 78 - 100 fL    MCH 30.5 26.5 - 33.0 pg    MCHC 33.2 31.5 - 36.5 g/dL    RDW 15.1 (H) 10.0 - 15.0 %    Platelet Count 51 (L) 150 - 450 10e3/uL   Manual Differential     Status: Abnormal   Result Value Ref Range    % Neutrophils 13 %    % Lymphocytes 58 %    % Monocytes 18 %    % Eosinophils 5 %    % Basophils 2 %    % Myelocytes 4 %    Absolute Neutrophils 0.2 (LL) 1.6 - 8.3 10e3/uL    Absolute Lymphocytes 0.7 (L) 0.8 - 5.3 10e3/uL    Absolute Monocytes  0.2 0.0 - 1.3 10e3/uL    Absolute Eosinophils 0.1 0.0 - 0.7 10e3/uL    Absolute Basophils 0.0 0.0 - 0.2 10e3/uL    Absolute Myelocytes 0.0 <=0.0 10e3/uL   RBC and Platelet Morphology     Status: None   Result Value Ref Range    RBC Morphology Confirmed RBC Indices     Platelet Assessment  Automated Count Confirmed. Platelet morphology is normal.     Automated Count Confirmed. Platelet morphology is normal.   CBC with Platelets & Differential     Status: Abnormal    Narrative    The following orders were created for panel order CBC with Platelets & Differential.  Procedure                               Abnormality         Status                     ---------                               -----------         ------                     CBC with platelets and ...[[id number]]  Abnormal            Final result               RBC and Platelet Morpho...[[id number]]                      Final result               Manual Differential[[id number]]         Abnormal            Final result                 Please view results for these tests on the individual orders.

## 2025-04-16 ENCOUNTER — ALLIED HEALTH/NURSE VISIT (OUTPATIENT)
Dept: TRANSPLANT | Facility: CLINIC | Age: 60
End: 2025-04-16
Payer: MEDICARE

## 2025-04-16 ENCOUNTER — LAB (OUTPATIENT)
Dept: LAB | Facility: CLINIC | Age: 60
End: 2025-04-16
Payer: MEDICARE

## 2025-04-16 ENCOUNTER — OFFICE VISIT (OUTPATIENT)
Dept: TRANSPLANT | Facility: CLINIC | Age: 60
End: 2025-04-16
Payer: MEDICARE

## 2025-04-16 VITALS
DIASTOLIC BLOOD PRESSURE: 81 MMHG | SYSTOLIC BLOOD PRESSURE: 133 MMHG | HEART RATE: 93 BPM | OXYGEN SATURATION: 95 % | WEIGHT: 182 LBS | BODY MASS INDEX: 30.32 KG/M2 | HEIGHT: 65 IN | RESPIRATION RATE: 20 BRPM | TEMPERATURE: 98.3 F

## 2025-04-16 VITALS
WEIGHT: 182 LBS | SYSTOLIC BLOOD PRESSURE: 133 MMHG | HEIGHT: 65 IN | OXYGEN SATURATION: 95 % | BODY MASS INDEX: 30.32 KG/M2 | HEART RATE: 93 BPM | RESPIRATION RATE: 20 BRPM | DIASTOLIC BLOOD PRESSURE: 81 MMHG

## 2025-04-16 DIAGNOSIS — C92.00 ACUTE MYELOID LEUKEMIA NOT HAVING ACHIEVED REMISSION (H): ICD-10-CM

## 2025-04-16 DIAGNOSIS — C92.00 ACUTE MYELOID LEUKEMIA NOT HAVING ACHIEVED REMISSION (H): Primary | ICD-10-CM

## 2025-04-16 DIAGNOSIS — Z71.9 VISIT FOR COUNSELING: Primary | ICD-10-CM

## 2025-04-16 LAB
ALBUMIN SERPL BCG-MCNC: 4.1 G/DL (ref 3.5–5.2)
ALP SERPL-CCNC: 220 U/L (ref 40–150)
ALT SERPL W P-5'-P-CCNC: 32 U/L (ref 0–50)
ANION GAP SERPL CALCULATED.3IONS-SCNC: 13 MMOL/L (ref 7–15)
AST SERPL W P-5'-P-CCNC: 25 U/L (ref 0–45)
BILIRUB SERPL-MCNC: 0.3 MG/DL
BUN SERPL-MCNC: 8.3 MG/DL (ref 6–20)
CALCIUM SERPL-MCNC: 10 MG/DL (ref 8.8–10.4)
CHLORIDE SERPL-SCNC: 103 MMOL/L (ref 98–107)
CREAT SERPL-MCNC: 0.68 MG/DL (ref 0.51–0.95)
EGFRCR SERPLBLD CKD-EPI 2021: >90 ML/MIN/1.73M2
ERYTHROCYTE [DISTWIDTH] IN BLOOD BY AUTOMATED COUNT: 15.7 % (ref 10–15)
GLUCOSE SERPL-MCNC: 103 MG/DL (ref 70–99)
HCO3 SERPL-SCNC: 27 MMOL/L (ref 22–29)
HCT VFR BLD AUTO: 24.1 % (ref 35–47)
HGB BLD-MCNC: 8.1 G/DL (ref 11.7–15.7)
MCH RBC QN AUTO: 30.2 PG (ref 26.5–33)
MCHC RBC AUTO-ENTMCNC: 33.6 G/DL (ref 31.5–36.5)
MCV RBC AUTO: 90 FL (ref 78–100)
PLAT MORPH BLD: NORMAL
PLATELET # BLD AUTO: 17 10E3/UL (ref 150–450)
POTASSIUM SERPL-SCNC: 3.5 MMOL/L (ref 3.4–5.3)
PROT SERPL-MCNC: 7.8 G/DL (ref 6.4–8.3)
RBC # BLD AUTO: 2.68 10E6/UL (ref 3.8–5.2)
RBC MORPH BLD: NORMAL
SODIUM SERPL-SCNC: 143 MMOL/L (ref 135–145)
WBC # BLD AUTO: 13.4 10E3/UL (ref 4–11)

## 2025-04-16 PROCEDURE — 85007 BL SMEAR W/DIFF WBC COUNT: CPT

## 2025-04-16 PROCEDURE — 86832 HLA CLASS I HIGH DEFIN QUAL: CPT

## 2025-04-16 PROCEDURE — 86833 HLA CLASS II HIGH DEFIN QUAL: CPT

## 2025-04-16 PROCEDURE — 86828 HLA CLASS I&II ANTIBODY QUAL: CPT

## 2025-04-16 PROCEDURE — 86644 CMV ANTIBODY: CPT

## 2025-04-16 PROCEDURE — 82310 ASSAY OF CALCIUM: CPT

## 2025-04-16 PROCEDURE — 85018 HEMOGLOBIN: CPT

## 2025-04-16 PROCEDURE — 36415 COLL VENOUS BLD VENIPUNCTURE: CPT

## 2025-04-16 PROCEDURE — 82040 ASSAY OF SERUM ALBUMIN: CPT

## 2025-04-16 ASSESSMENT — PAIN SCALES - GENERAL
PAINLEVEL_OUTOF10: MILD PAIN (3)
PAINLEVEL_OUTOF10: MILD PAIN (3)

## 2025-04-16 NOTE — PROGRESS NOTES
BMT / Cell Therapy Consultation      Alejandra Villegas is a 59 year old female referred by  *** for ***.      Disease presentation and baseline characteristics:  ***    Date Treatment Name Response Side Effects / Toxicities                                  HPI:  Please see my entry above for hematologic history.  ***      ASSESSMENT AND PLAN:  Alejandra Villegas is a 59 year old female with a disease/malignancy*** that is currently incurable by standard chemotherapeutic approaches: ***. To date, the only modality that offers a chance at potential cure of *** is allogeneic hematopoietic stem cell transplantation.  Based upon my assessment of disease risk and comorbidities, Rain may be a suitable candidate for allogeneic HSCT, pending workup.     We had a detailed discussion about the rationale for transplant in this situation, requirements for proceeding, which include insurance approval, identification of an adequate source of donor hematopoetic progenitor cells, availability of a full-time caregiver along with residence within 30 minutes of the [facility] through at least day +100 post transplant, and compliance with the immunosuppression and supportive care medication regimen prescribed by providers at the [facility].      We further discussed the risks related to transplant itself, including toxicities from the preparative regimen, severe infections, the need for red blood cell and platelet transfusion support, the risk of graft rejection, the risk of acute and chronic pnbnd-mfvmtl-mwjq disease, the need for immunosuppressive medications, the potential for severe organ toxicity including lungs, liver, skin, and kidneys, the possibility of long-term disability, and the risk of death due to complications of the transplant.  We discussed the risk of disease relapse despite going forward with a transplant, as well as strategies to mitigate relapse post-transplant, including (re)starting *** maintenance therapy  post-transplant.    Under consideration at this time is a *** preparative regimen, with matched related/unrelated PBSC/BM*** as the donor source.  Clinical trial options discussed include ***.  Timing of transplant depends upon re-assessing response ***, and further evaluation of organ function.    Additional points discussed included ***    Will a transplant be safe?  HCTCI estimated to be ***. Simplified comorbidity index estimated to be ***. Estimated 2-year non-relapse mortality is ***.  With PTCy as the backbone of GVHD prophylaxis,  life-threatening grade III-IV acute GVHD and moderate/severe chronic GVHD incidence is <10%.      Will a transplant be efficacious?   [facility] Survival Calculator estimates 1 year overall survival to be ***. [facility] disease risk index (DRI) estimates overall survival at 2 years to be *** when focused solely on transplant efficacy for preventing relapse of primary disease. Relapse of the underlying disease is the most frequent cause of transplant failure, which might be mitigated by maintenance strategies.      Summary: ***    Plan:    Referring Oncologist Office will do the following:  ***          BMT Office will do the following  ***        Primary Desired Protocol: ***  Arm: ***  Preferred Graft Source: {Graft Source:[id number]}    Alternate/Back-up Protocol: ***  Arm: ***    Clinical Trials Discussed: {YES/NO:60}  Approximate Timeline to workup: ***    Orders placed for interim testing prior to transplant workup: {Yes N/A:448808}        I spent *** minutes in the care of this patient today, which included time necessary for preparation for the visit, obtaining history, ordering medications/tests/procedures as medically indicated, review of pertinent medical literature, counseling of the patient, coordinating care, communication of recommendations to the care team, and documentation time.    Wenceslao Samuel MD PhD    ROS: Pertinent positive and negative systems described in  HPI; the remainder of the 14 systems are negative       Past Medical History:   Diagnosis Date    Allergic rhinitis 09/27/2011         Disorder of kidney and ureter 08/08/2008    Kidney disease GFR 87    Dorsalgia     No Comments Provided    Generalized anxiety disorder     No Comments Provided    Hidradenitis suppurativa     No Comments Provided    Other disorders of lung (CODE) 08/01/2007    Pulmonary nodules, stable on follow-up chest CT 12/08.  No further imaging recommended.    Other specified disorders of Eustachian tube, unspecified ear 02/27/2012         Personal history of other medical treatment (CODE)     Childbirth x4    Rheumatoid arthritis (H) 02/27/2012         Tobacco use     No Comments Provided       Past Surgical History:   Procedure Laterality Date    ARTHROSCOPY KNEE  05/2017    Dr Torres    ARTHROSCOPY KNEE  07/20/2017    Dr Garza, lateral release, meniscal repair    ARTHROTOMY WRIST Left 2011    Dr. Torres    BONE MARROW BIOPSY, BONE SPECIMEN, NEEDLE/TROCAR Left 02/13/2025    Procedure: Bone marrow biopsy, bone specimen, needle/trocar;  Surgeon: Hema Mari MD;  Location:  OR    CHOLECYSTECTOMY  06/2007    Emergency tracheotomy following failed intubation for laparoscopic cholecystectomy.    DILATION AND CURETTAGE  09/2006         HERNIA REPAIR  2007    Incisoinal hernia repair    HYSTERECTOMY TOTAL ABDOMINAL  02/2010         IMPLANT STIMULATOR AND LEADS SACRAL NERVE (STAGE ONE AND TWO) N/A 12/11/2018    Procedure: Interstim Battery Replacement;  Surgeon: lR Ambriz MD;  Location:  OR    INTERSTIM DEVICE STAGE 2  01/06/2014    Dr. Ambriz [facility]    LAPAROSCOPIC ABLATION ENDOMETRIOSIS  09/2006         OOPHORECTOMY Left 2010     Dr Thorpe    PICC INSERTION - DOUBLE LUMEN Right 03/05/2025    5FR, DL, total cath length=42 CM, visible cath length= 3CM, brachial medial vein    RECONSTRUCT FOREFOOT WITH METATARSOPHALANGEAL (MTP) FUSION Right 05/05/2022    Procedure: Right fibular  sesmoid excision and 1st metatarsal phalangeal joint fusion;  Surgeon: Rl Nathan DPM;  Location: [facility] OR    RELEASE CARPAL TUNNEL  02/02/2017         RELEASE PLANTAR FASCIA Left 12/19/2024    Procedure: excision of soft tissue mass left foot,  gastroc recession;  Surgeon: Rl Nathan DPM;  Location:  OR    REMOVE HARDWARE FOOT Right 08/10/2023    Procedure: REMOVAL, HARDWARE, FOOT;  Surgeon: Rl Nathan DPM;  Location: [facility] OR    SALPINGO OOPHORECTOMY,R/L/KELSEY Right 06/1999    Right salpingo-oophorectomy for hemorrhagic corpus luteum cyst    TRACHEOSTOMY  2007    emergency following failed intubation during cholecystectomy    TYMPANOSTOMY, LOCAL/TOPICAL ANESTHESIA  08/2006    PE tube placement       Family History   Problem Relation Age of Onset    Arthritis Mother         Arthritis,Rheumatoid    Cancer Mother         Cancer, lung and uterine cancer    Heart Disease Father         Heart Disease,CAD, CABG, peripheral vascular dise    Thyroid Disease Father         Thyroid Disease, hyperlipidemia    Other - See Comments Father         djd    Asthma Brother         Asthma    Asthma Sister         Asthma    Other - See Comments Sister         Psychiatric illness,Anxiety    Other - See Comments Son         x2 back surgeries    Breast Cancer No family hx of         Cancer-breast       Social History     Tobacco Use    Smoking status: Every Day     Current packs/day: 1.00     Average packs/day: 1 pack/day for 44.3 years (44.3 ttl pk-yrs)     Types: Cigarettes     Start date: 1981     Passive exposure: Past    Smokeless tobacco: Never    Tobacco comments:     Passive exposure in childhood and adult homes and some work places   Vaping Use    Vaping status: Never Used   Substance Use Topics    Alcohol use: No     Alcohol/week: 0.0 standard drinks of alcohol    Drug use: No         Allergies   Allergen Reactions    Adhesive Tape     Bee Pollen Swelling     Seasonal    Bee Venom Unknown    Folic Acid Itching     Pollen Extract      Seasonal    Amoxicillin Rash    Chlorhexidine Rash     After several weeks, started to develop rash on R neck down to chest and arm. Also noted on back of knees. Providers suggesting related to CHG as nothing else is new for pt.     Liquid Adhesive Rash    Meloxicam Rash    Nabumetone GI Disturbance     GI upset        Current Outpatient Medications   Medication Sig Dispense Refill    acetaminophen (TYLENOL) 500 MG tablet Take 1,000 mg by mouth 3 times daily.      acyclovir (ZOVIRAX) 400 MG tablet Take 1 tablet (400 mg) by mouth 2 times daily. 60 tablet 0    albuterol (PROAIR HFA/PROVENTIL HFA/VENTOLIN HFA) 108 (90 Base) MCG/ACT inhaler Inhale 1-2 puffs into the lungs every 4 hours as needed for shortness of breath, wheezing or cough. 18 g 4    budesonide-formoterol (SYMBICORT) 80-4.5 MCG/ACT Inhaler Inhale 2 puffs into the lungs 2 times daily - Rinse mouth well after use to prevent Thrush 10.2 g 11    calcium carbonate (TUMS) 500 MG chewable tablet Take 1 tablet (500 mg) by mouth 3 times daily as needed for heartburn. 90 tablet 0    cyanocobalamin (VITAMIN B-12) 500 MCG tablet Take 500 mcg by mouth daily.      cyclobenzaprine (FLEXERIL) 10 MG tablet Take 1 tablet (10 mg) by mouth at bedtime. 30 tablet 3    famotidine (PEPCID) 20 MG tablet Take 1 tablet (20 mg) by mouth 2 times daily. 60 tablet 3    fluticasone (FLONASE) 50 MCG/ACT nasal spray Spray 2 sprays into both nostrils 2 times daily. 15.8 mL 0    gabapentin (NEURONTIN) 400 MG capsule Take 1 capsule (400 mg) by mouth 3 times daily. 90 capsule 0    hydrOXYzine HCl (ATARAX) 25 MG tablet Take 1-2 tablets (25-50 mg) by mouth every 6 hours as needed for anxiety or itching (adjuvant pain, sleep). 90 tablet 3    ipratropium - albuterol 0.5 mg/2.5 mg/3 mL (DUONEB) 0.5-2.5 (3) MG/3ML neb solution Take 1 vial (3 mLs) by nebulization every 4 hours as needed for shortness of breath, wheezing or cough 90 mL 11    levofloxacin (LEVAQUIN) 250 MG tablet  Take 1 tablet (250 mg) by mouth daily. Start on hospital discharge; continue through soo (low point in blood counts) until ANC >1.0, or as otherwise instructed by your oncology team. 30 tablet 3    loratadine (CLARITIN) 10 MG tablet Take 1 tablet (10 mg) by mouth daily. 30 tablet 0    ondansetron (ZOFRAN ODT) 4 MG ODT tab Take 1-2 tablets (4-8 mg) by mouth every 8 hours as needed for nausea or vomiting. 45 tablet 3    order for DME Equipment being ordered: home neb set up with mask and tubing 1 Device 0    pantoprazole (PROTONIX) 40 MG EC tablet Take 1 tablet (40 mg) by mouth 2 times daily (before meals). 60 tablet 3    PARoxetine (PAXIL) 20 MG tablet TAKE 1 TABLET (20 MG) BY MOUTH EVERY MORNING 90 tablet 0    posaconazole (NOXAFIL) 100 MG DR tablet Take 3 tablets (300 mg) by mouth every morning. 90 tablet 3    prednisoLONE acetate (PRED FORTE) 1 % ophthalmic suspension Place 2 drops into both eyes 4 times daily. Please continue through Monday, 4/7. (Patient not taking: Reported on 4/16/2025) 5 mL 0    prochlorperazine (COMPAZINE) 5 MG tablet Take 1 tablet (5 mg) by mouth every 6 hours as needed for nausea or vomiting. 30 tablet 3    rosuvastatin (CRESTOR) 20 MG tablet Take 1 tablet (20 mg) by mouth daily. 30 tablet 3    sucralfate (CARAFATE) 1 GM/10ML suspension Take 10 mLs (1 g) by mouth 4 times daily (before meals and nightly). 414 mL 0    SUMAtriptan (IMITREX) 50 MG tablet Take 1 tablet (50 mg) by mouth at onset of headache for migraine May repeat in 2 hours. Max 4 tablets/24 hours. 9 tablet 1    Vitamin D, Cholecalciferol, 25 MCG (1000 UT) CAPS Take 1,000 Units by mouth daily. 90 capsule 1         Physical Exam:     Vital Signs: LMP 01/01/2007 (Approximate)   Gen: Well appearing, in NAD  HEENT: EOMI, PERRL, mmm, oropharynx clear  LAD: no palpable cervical, supraclavicular, axillary or inguinal lymphadenopathy.  CV: Normal rate, regular rhythm. No m/r/g  Pulm: CTAB, no wheezing, normal work of breathing  Abd:  Soft, nt/nd, no rebound/guarding  Ext: Warm and well perfused. No lower extremity edema  Skin: No rash, cyanosis or petechial lesion  Neuro: Alert and answering questions appropriately. CNII-XII grossly intact. Moving all extremities without issue or focal neurologic deficits. Biceps/tricpes/deltoid strength 5/5 bilaterally. Strength 5/5 bilaterally with hip flexion, knee flexion/extension and dorsi/plantar flexion.     KPS:  ***    Blood Counts       Recent Labs   Lab Test 04/14/25  0808 04/10/25  0947 04/07/25  1016 04/06/25  0518 03/07/25  0550 03/06/25  0507 03/05/25  0611 03/04/25  0605   HGB 8.1* 9.3* 8.7* 9.0*   < > 7.8* 8.6* 8.4*   HCT 24.4* 28.7* 25.8* 27.8*   < > 23.0* 24.5* 25.1*   WBC 1.2* 0.8* 4.2 4.6   < > 2.2* 3.3* 3.8*   ANEUTAUTO  --  0.2* 3.7 4.4   < >  --   --   --    ALYMPAUTO  --  0.5* 0.4* 0.1*   < >  --   --   --    AMONOAUTO  --  0.0 0.0 0.0   < >  --   --   --    AEOSAUTO  --  0.0 0.0 0.0   < >  --   --   --    ABSBASO  --  0.0 0.0 0.0   < >  --   --   --    NRBCMAN  --  0.0 0.0 0.0   < >  --   --   --    ABLA  --   --   --   --   --  0.2* 0.2* 0.1*   PLT 51* 416 800* 983*   < > 161 166 171    < > = values in this interval not displayed.       ABO/RH    Recent Labs   Lab Test 04/06/25  0518   ABORH A POS         Chemistries     Basic Panel  Recent Labs   Lab Test 04/14/25  0808 04/10/25  0947 04/07/25  1016    138 137   POTASSIUM 3.5 4.3 3.8   CHLORIDE 104 102 98   CO2 25 25 28   BUN 9.8 21.8* 15.0   CR 0.56 0.58 0.57   * 100* 106*        Calcium, Magnesium, Phosphorus  Recent Labs   Lab Test 04/14/25  0808 04/10/25  0947 04/07/25  1016 04/06/25  0518 04/05/25  0519 04/04/25  0452   TOBIN 9.2 9.2 9.0 9.0 9.5 9.4   MAG  --   --   --  2.6* 2.6* 2.8*   PHOS  --   --   --  3.7 4.4 4.3        LFTs  Recent Labs   Lab Test 04/14/25  0808 04/10/25  0947 04/07/25  1016   BILITOTAL 0.3 0.6 0.5   ALKPHOS 189* 186* 185*   AST 35 21 36   ALT 50 41 56*   ALBUMIN 3.8 3.8 3.7       LDH  Recent  Labs   Lab Test 04/06/25  0518 04/05/25  0519 04/04/25  0452   * 327* 364*         Monocloncal Protein Studies     M spike    Recent Labs   Lab Test 02/10/25  1359   ELPM 0.0       [place] FLC    Recent Labs   Lab Test 02/10/25  1359   KFLCA 4.56*       [place] FLC    Recent Labs   Lab Test 02/10/25  1359   LFLCA 3.03*       FLC Ratio    Recent Labs   Lab Test 02/10/25  1359   KLRA 1.50       Infectious Disease Markers     Hepatitis and HIV    Recent Labs   Lab Test 02/26/25  1330   HEPBANG Nonreactive   HBCAB Nonreactive   AUSAB <3.50   HCVAB Nonreactive   HIAGAB Nonreactive         CMV  Recent Labs   Lab Test 02/26/25  1330   CMVIGG Positive, suggests recent or past exposure.*         EBV    Recent Labs   Lab Test 02/26/25  1330   EBVCAG Positive*       Bone Marrow Biopsy     Results for orders placed or performed during the hospital encounter of 02/26/25 (from the past 8760 hours)   Bone marrow biopsy   Result Value    Final Diagnosis      Bone marrow, posterior iliac crest, right decalcified trephine biopsy, touch imprint, direct aspirate smear, concentrated aspirate smear, and peripheral blood smear:    - Hypercellular marrow for age (cellularity estimated at 60-70%) with trilineage hematopoietic maturation, no overt dysplasia, and no increase in blasts  - Peripheral blood showing moderate normochromic, normocytic anemia; monocytosis and no circulating blasts  - See comment      Comment      Concurrent flow cytometry ([id number]) showed blasts percentage approaches the upper limit of normal with unusual immunophenotype (4.3%). The overall picture shows a hypercellular marrow with increased megakaryocytes which is compatible with the picture of a regenerating bone marrow, however, the possibility of residual leukemia can not be ruled out.     Given the genetic profile of leukemia at the time of diagnosis with NPM1, IDH2 and NRAS mutations, correlation with the results of ancillary tests and clinical findings  is recommended for further assessment of residual disease. Concurrent ancillary studies are in progress and will be reported separately.      Concurrent ancillary studies are in progress and will be reported separately. Correlation with the results of ancillary tests and clinical findings is recommended.    The red blood cell morphology may not be representative for this patient due to recent red blood cell transfusion.        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 They have required transfusion support including multiple units of packed red blood cells (most recent transfusion 3/25/2025) and platelets (most recent transfusion 3/20/2025). The most recent bone marrow biopsy ([id number]; 3/19/2025) showed a hypocellular marrow with minimal hematopoiesis and approximately 2% blasts by CD34 IHC. Flow cytometry studies from that biopsy ([id number]) showed no increase in myeloid blasts and no abnormal myeloid blasts were identified. This biopsy is being performed to evaluate response to therapy.      Peripheral Hematologic Data      CBC WITH DIFFERENTIAL (03/31/2025 04:13 AM CDT):     RESULT VALUE REF. RANGE UNITS   WBC Count   RBC Count    Hemoglobin    Hematocrit   MCV  MCH  MCHC   RDW   Platelet Count 5.9 (NORMAL)     2.54  ( L )    7.9  ( L )      23.8  ( L )  94 (NORMAL)     31.1 (NORMAL)     33.2 (NORMAL)      15.6  ( H )   () 4.0-11.0  3.80-5.20  11.7-15.7  35.0-47.0    26.5-33.0  31.5-36.5  10.0-15.0  150-450 10e3/uL  10e6/uL  g/dL  %  fL  pg  g/dL  %  10e3/uL   % Neutrophils  % Lymphocytes  % Monocytes  % Eosinophils  % Basophils  % Immature Granulocytes  Absolute Neutrophils  Absolute Lymphocytes   Absolute Monocytes   Absolute Eosinophils  Absolute Basophils  Absolute Immature Granulocytes   NRBCs per 100 WBC   Absolute NRBCs 47  21  29  0  1  2  2.8 (NORMAL)  1.3 (NORMAL)   1.7  ( H  )  0.0 (NORMAL)  0.1 (NORMAL)  0.1 (NORMAL)   3  ( H )  0.2 () N/A  N/A  N/A  N/A  N/A  N/A  1.6-8.3  0.8-5.3  0.0-1.3  0.0-0.7  0.0-0.2  <=0.4  <1  <=0.0 %  %  %  %  %  %  10e3/uL  10e3/uL  10e3/uL  10e3/uL  10e3/uL  10e3/uL  /100  10e3/uL         Microscopic Description      PERIPHERAL BLOOD  The red cells appear normochromic.  Poikilocytosis is minimal. Polychromasia is not increased. Rouleaux formation is not increased. The morphology of platelets is normal. Lymphocytes are polymorphous. Neutrophils show increased azurophilic cytoplasmic granulation and unremarkable nuclear morphology. No circulating blasts are not identified on scanning.     BONE MARROW   Bone marrow aspirates and touch imprints of bone biopsy are reviewed.  (500 cells on bone marrow biopsy touch imprints  by TS)  Percent (%) Cell Population Reference Range (%)   1.0 Blasts  (0 - 1)   0.2 Neutrophil promyelocytes (2 - 4)   58.2 Neutrophils and precursors (54 - 63)   16.8 Erythroid precursors (18 - 24)   7.2 Monocytes (1- 1.5)   0.8 Eosinophils (1 - 3)   1.0 Basophils (0 - 1)   12.8 Lymphocytes (8 - 12)   2.0 Plasma cells (0 - 1.5)     The aspirate smears and touch imprints are adequate for evaluation.    Granulocytic cells are adequate in number with progressive and complete maturation; a subset of neutrophils show increased azurophilic cytoplasmic granulation. Erythroid precursors are relatively decreased in number with progressive and complete maturation; no overt dysplasia is observed. Megakaryocytes are present and show an overall normal morphologic spectrum. Monocytes are relatively increased in number and have unremarkable morphology.    TREPHINE SECTIONS:  Hematoxylin and eosin stains are reviewed. The trephine core biopsy is adequate. The marrow cellularity is estimated at 60-70%. Megakaryocytes are increased in comparison to the degree of marrow cellularity with unremarkable morphology. No clusters or aggregates of blasts are  identified.     IMMUNOHISTOCHEMISTRY  Immunohistochemical stains are performed on the paraffin-embedded trephine sections with appropriately reactive control tissues.    CD34 highlights rare scattered immature myeloid cells; no aggregates or clusters of CD34 positive cells are seen.  CD61 highlights megakaryocytes with a spectrum of sizes and are overall unremarkable in morphology.   rare scattered mast cells (strong intensity), rare scattered immature myeloid cells and immature erythroid cells (weak intensity).    Note: These immunohistochemical stains are deemed medically necessary. Some of the antigens may also be evaluated by flow cytometry. Concurrent evaluation by immunohistochemistry on clot and/or trephine sections is indicated in this case in order to correlate immunophenotype with cell morphology and determine extent of involvement, spatial pattern, and focality of potential disease distribution.      Gross Description      Procedure/Gross Description   Aspirate(s) and trephine(s) procured by GIOVANNI Esquivel    Specimen sent for Special Studies:         Flow Cytometry: right aspirate        Cytogenetics: right aspirate        Molecular Diagnostics: right aspirate        Biopsy aspiration site: right posterior iliac crest                                                      (Reference Range)          Amount of aspirate           4.9   mL        Fat and P.V. cell layer        trace    %               (1 - 3)        Particles                             trace   %        Myeloid-erythroid layer    2    %               (5 - 8)          Clot Section: no    Trephine biopsy site: right posterior iliac crest    Designated right posterior iliac crest is 1 cylinder of gritty tissue, labeled with the patient's name and hospital number, obtained with 11 gauge needle and a length of 15 mm; entirely submitted in 1 cassette; acetic zinc formalin fixed, decalcified, processed, and stained for hematoxylin and eosin per  laboratory protocol.          Performing Labs      The technical component of this testing was completed at [facility].     Stain controls for all stains resulted within this report have been reviewed and show appropriate reactivity.            Chest CT without Contrast       No results found for this or any previous visit.    Results for orders placed during the hospital encounter of 25    ECHOCARDIOGRAM COMPLETE    Status: Normal 2025    Narrative  [id number]  [id number]  [id number]  [id number]^TERELL^MARIA C^ANGEL    [facility]  Echocardiography Laboratory  [address] [place] [address]    Name: MAGGY ARNETT  MRN: [id number]  : 1965  Study Date: 2025 07:30 AM  Age: 59 yrs  Gender: Female  Patient Location: Count includes the [facility]  Reason For Study: Cardiomegaly  Ordering Physician: MARIA C FIGUEREDO  Referring Physician: JACKI CABELLO  Performed By: Lexi Jackson RDCS    BSA: 1.9 m2  Height: 64 in  Weight: 178 lb  BP: 133/79 mmHg  ______________________________________________________________________________  Procedure  Echocardiogram with two-dimensional, color and spectral Doppler.  ______________________________________________________________________________  Interpretation Summary  Left ventricular size, wall motion and function are normal. The ejection  fraction is 60-65%.  Right ventricular function, chamber size, wall motion, and thickness are  normal.  Aortic valve sclerosis is present. Mild aortic insufficiency is present.  This study was compared with the study from 25: Aortic valve gradients  are slightly lower. On comparison, prior elevated gradients appear to have  been due to higher-flow state rather than true valvular aortic stenosis.  ______________________________________________________________________________  Left Ventricle  Left ventricular size, wall  motion and function are normal. The ejection  fraction is 60-65%.    Right Ventricle  Right ventricular function, chamber size, wall motion, and thickness are  normal.    Mitral Valve  The mitral valve is normal. Trace mitral insufficiency is present.    Aortic Valve  Aortic valve sclerosis is present. Mild aortic insufficiency is present.    Tricuspid Valve  The tricuspid valve is normal. Trace tricuspid insufficiency is present. The  right ventricular systolic pressure is approximated at 30.1 mmHg plus the  right atrial pressure.    Pulmonic Valve  The valve leaflets are not well visualized. Trace pulmonic insufficiency is  present.    Vessels  The aorta root is normal. Dilation of the inferior vena cava is present with  abnormal respiratory variation in diameter.    Pericardium  No pericardial effusion is present.    Compared to Previous Study  This study was compared with the study from 25 . Aortic valve gradients  are slightly lower. On comparison, prior elevated gradients appear to have  been due to higher-flow state rather than true valvular aortic stenosis.    ______________________________________________________________________________  MMode/2D Measurements & Calculations  IVSd: 0.95 cm  LVIDd: 4.8 cm  LVIDs: 3.5 cm  LVPWd: 1.00 cm  FS: 26.6 %    LV mass(C)d: 166.0 grams  LV mass(C)dI: 89.2 grams/m2  RWT: 0.41    Doppler Measurements & Calculations  Ao V2 max: 232.6 cm/sec  Ao max P.6 mmHg  Ao V2 mean: 146.5 cm/sec  Ao mean P.9 mmHg  Ao V2 VTI: 56.2 cm  TR max andrew: 274.3 cm/sec  TR max P.1 mmHg    ______________________________________________________________________________  Report approved by: Manuel Robles MD on 2025 09:16 AM      Alejandra understood the above assessment and recommendations.  Multiple questions answered.  No barriers to learning identified. ***      Known issues that I take into account for medical decisions, with salient changes to the plan considering  these complexities noted above.    Patient Active Problem List   Diagnosis    Alopecia areata    Anxiety state    Benign paroxysmal positional vertigo    Other isolated or specific phobias    Degenerative disc disease at L5-S1 level    Gastroesophageal reflux disease    Hidradenitis suppurativa    Symptomatic menopausal or female climacteric states    Pain management contract broken    Other diseases of lung, not elsewhere classified    Seasonal allergic rhinitis    Tobacco use disorder    Urge incontinence    Ovarian cyst, left    Other acquired deformity of ankle and foot(736.79)    Asthma    RA (rheumatoid arthritis) (H)    Osteopenia of right foot    CKD (chronic kidney disease) stage 2, GFR 60-89 ml/min    Pain in joint, ankle and foot, left    Pulmonary emphysema, unspecified emphysema type (H)    Benign essential hypertension    Mixed hyperlipidemia    Macrocytic anemia    Anemia    Leukopenia    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)    Pancytopenia due to antineoplastic chemotherapy    Hiatal hernia       ------------------------------------------------------------------------------------------------------------------------------------------------    Patient Care Team         Relationship Specialty Notifications Start End    No Ref-Primary, Physician PCP - General   2/20/25     Fax: [phone]         Rl Nathan DPM MD Podiatry  4/29/22     Phone: [phone] Fax: [phone] [address] [facility] [address]    Rl Nathan DPM Assigned Musculoskeletal Provider   5/1/22     Phone: [phone] Fax: [phone]         [facility] [address] [address]    Dipak Welsh MD MD Family Medicine  8/31/22     Phone: [phone] Fax: [phone]         [address] [facility] [address]    Rl Nathan DPM MD Podiatry  8/31/22     Phone: [phone] Fax: [phone]         [facility] [place] [address]     Rl Nathan DPM MD Podiatry  9/22/22     Phone: [phone] Fax: [phone] [address] [place] [address]    Rl Nathan DPM MD Podiatry  10/10/22     Phone: [phone] Fax: [phone]         [address] [place] [address]    Leonard Ambriz APRN CNP Assigned PCP   2/23/25     Phone: [phone] Fax: [phone]         [address] [place] [address]    Samina Harris MD Assigned Cancer Care Provider   2/23/25     Phone: [phone] Fax: [phone]         [address] [facility] [address]    Lisha Winkler, RN Specialty Care Coordinator Hematology & Oncology Admissions 2/24/25     Consuelo Lozano RN Lead Care Coordinator Primary Care -  Admissions 4/14/25

## 2025-04-16 NOTE — NURSING NOTE
"Oncology Rooming Note    April 16, 2025 12:43 PM   Alejandra Villegas is a 59 year old female who presents for:    Chief Complaint   Patient presents with    Oncology Clinic Visit     Acute Myeloid Leukemia      Initial Vitals: /81   Pulse 93   Temp 98.3  F (36.8  C) (Oral)   Resp 20   Ht 1.66 m (5' 5.35\")   Wt 82.6 kg (182 lb)   LMP 01/01/2007 (Approximate)   SpO2 95%   BMI 29.96 kg/m   Estimated body mass index is 29.96 kg/m  as calculated from the following:    Height as of this encounter: 1.66 m (5' 5.35\").    Weight as of this encounter: 82.6 kg (182 lb). Body surface area is 1.95 meters squared.  Mild Pain (3) Comment: Data Unavailable   Patient's last menstrual period was 01/01/2007 (approximate).  Allergies reviewed: Yes  Medications reviewed: Yes    Medications: Medication refills not needed today.  Pharmacy name entered into [employer]: [id number] - [place], [place] - [address]    Frailty Screening:   Is the patient here for a new oncology consult visit in cancer care? 2. No    PHQ9:  Did this patient require a PHQ9?: No      Clinical concerns:        Margarita Peace CMA              "

## 2025-04-16 NOTE — NURSING NOTE
BMT IVERSON Frailty assessment completed with patient in clinic.   Patient had no questions and expressed understanding of   what assessment was being done.     Margarita Peace CMA (AAMA)

## 2025-04-16 NOTE — NURSING NOTE
Chief Complaint   Patient presents with    Labs Only     Blood draw via vpt by MA.     Meg Mcpherson MA

## 2025-04-16 NOTE — PROGRESS NOTES
Blood and Marrow Transplant - New Evaluation Appointment    Spoke with Alejandra urias Bart, patient's sister, following visit with Dr. Samuel. I explained the role of the nurse coordinator throughout the process, as well as general time line and expectations for next steps. We discussed the necessity of a caregiver and the program's proximity requirements. All questions were answered.     Plan: Allogeneic Transplant, pending pending NPM1 PCR    Timeline Notes: if plan is to proceed with BMT, will need repeat visit with Dr. Samuel after completion of next chemo cycle (starts 5/1) to assess readiness for workup    Contact information provided for :  yes    Allo:  HLA typing drawn: N/A - previously drawn    PRA typing drawn:  Yes    CMV-IgG and ABO-Rh drawn or in record: Yes    Contact information provided for : Yes    Will sibling typing kits need to be sent? Yes    Financial Release for [facility] search obtained:  Yes    Phase Status updated: yes    BMT FRAILTY ASSESSMENT (55+)  BMT Fried Frailty          4/16/2025    12:32   Fried Frailty   Lost>10 pounds unintenionally last year N   Exhaustion Score 0   Slowness Score 1   Weakness/ Strength Score 0   Low Activity Level Score 0   Final Score Not Frail   Final Score Number 1   Sit Stand Assessment   Patient able to perform 5 chair stands Y   Chair Stands in seconds 17   Patient is able to perform stand with Feet Side by Side? Y   First attempt (in seconds): 15   Patient is able to perform Semi-Tandem Stand? Y   First attemp (in seconds): 15   Patient is able to perform Tandem Stand? Y   First attemp (in seconds): 10        Pt is 55+, documented frail, internal: No  If YES, order Cancer Rehab Referral

## 2025-04-17 ENCOUNTER — TELEPHONE (OUTPATIENT)
Dept: ONCOLOGY | Facility: OTHER | Age: 60
End: 2025-04-17

## 2025-04-17 ENCOUNTER — LAB (OUTPATIENT)
Dept: LAB | Facility: OTHER | Age: 60
End: 2025-04-17
Attending: INTERNAL MEDICINE
Payer: MEDICARE

## 2025-04-17 DIAGNOSIS — C92.00 ACUTE MYELOID LEUKEMIA NOT HAVING ACHIEVED REMISSION (H): ICD-10-CM

## 2025-04-17 DIAGNOSIS — C92.00 ACUTE MYELOID LEUKEMIA NOT HAVING ACHIEVED REMISSION (H): Primary | ICD-10-CM

## 2025-04-17 LAB
ALBUMIN SERPL BCG-MCNC: 4.1 G/DL (ref 3.5–5.2)
ALP SERPL-CCNC: 230 U/L (ref 40–150)
ALT SERPL W P-5'-P-CCNC: 29 U/L (ref 0–50)
ANION GAP SERPL CALCULATED.3IONS-SCNC: 14 MMOL/L (ref 7–15)
AST SERPL W P-5'-P-CCNC: 24 U/L (ref 0–45)
BASOPHILS # BLD MANUAL: 0 10E3/UL (ref 0–0.2)
BASOPHILS # BLD MANUAL: 0 10E3/UL (ref 0–0.2)
BASOPHILS NFR BLD MANUAL: 0 %
BASOPHILS NFR BLD MANUAL: 0 %
BILIRUB SERPL-MCNC: 0.5 MG/DL
BUN SERPL-MCNC: 8.3 MG/DL (ref 6–20)
CALCIUM SERPL-MCNC: 9.8 MG/DL (ref 8.8–10.4)
CHLORIDE SERPL-SCNC: 101 MMOL/L (ref 98–107)
CMV IGG SERPL IA-ACNC: 2.7 U/ML
CMV IGG SERPL IA-ACNC: ABNORMAL
CREAT SERPL-MCNC: 0.71 MG/DL (ref 0.51–0.95)
EGFRCR SERPLBLD CKD-EPI 2021: >90 ML/MIN/1.73M2
EOSINOPHIL # BLD MANUAL: 0.3 10E3/UL (ref 0–0.7)
EOSINOPHIL # BLD MANUAL: 0.5 10E3/UL (ref 0–0.7)
EOSINOPHIL NFR BLD MANUAL: 2 %
EOSINOPHIL NFR BLD MANUAL: 3 %
ERYTHROCYTE [DISTWIDTH] IN BLOOD BY AUTOMATED COUNT: 15.9 % (ref 10–15)
GLUCOSE SERPL-MCNC: 121 MG/DL (ref 70–99)
HCO3 SERPL-SCNC: 24 MMOL/L (ref 22–29)
HCT VFR BLD AUTO: 24.9 % (ref 35–47)
HGB BLD-MCNC: 8.4 G/DL (ref 11.7–15.7)
LYMPHOCYTES # BLD MANUAL: 1.6 10E3/UL (ref 0.8–5.3)
LYMPHOCYTES # BLD MANUAL: 2 10E3/UL (ref 0.8–5.3)
LYMPHOCYTES NFR BLD MANUAL: 12 %
LYMPHOCYTES NFR BLD MANUAL: 13 %
MCH RBC QN AUTO: 30.7 PG (ref 26.5–33)
MCHC RBC AUTO-ENTMCNC: 33.7 G/DL (ref 31.5–36.5)
MCV RBC AUTO: 91 FL (ref 78–100)
METAMYELOCYTES # BLD MANUAL: 0.5 10E3/UL
METAMYELOCYTES # BLD MANUAL: 0.8 10E3/UL
METAMYELOCYTES NFR BLD MANUAL: 4 %
METAMYELOCYTES NFR BLD MANUAL: 5 %
MONOCYTES # BLD MANUAL: 2.9 10E3/UL (ref 0–1.3)
MONOCYTES # BLD MANUAL: 3 10E3/UL (ref 0–1.3)
MONOCYTES NFR BLD MANUAL: 20 %
MONOCYTES NFR BLD MANUAL: 22 %
MYELOCYTES # BLD MANUAL: 0.6 10E3/UL
MYELOCYTES # BLD MANUAL: 0.8 10E3/UL
MYELOCYTES NFR BLD MANUAL: 4 %
MYELOCYTES NFR BLD MANUAL: 6 %
NEUTROPHILS # BLD MANUAL: 6.7 10E3/UL (ref 1.6–8.3)
NEUTROPHILS # BLD MANUAL: 8.1 10E3/UL (ref 1.6–8.3)
NEUTROPHILS NFR BLD MANUAL: 50 %
NEUTROPHILS NFR BLD MANUAL: 54 %
OTHER CELLS # BLD MANUAL: 0.3 10E3/UL
OTHER CELLS NFR BLD MANUAL: 2 %
PATH REV: ABNORMAL
PLAT MORPH BLD: NORMAL
PLATELET # BLD AUTO: 23 10E3/UL (ref 150–450)
POTASSIUM SERPL-SCNC: 4.2 MMOL/L (ref 3.4–5.3)
PROMYELOCYTES # BLD MANUAL: 0.2 10E3/UL
PROMYELOCYTES # BLD MANUAL: 0.3 10E3/UL
PROMYELOCYTES NFR BLD MANUAL: 1 %
PROMYELOCYTES NFR BLD MANUAL: 2 %
PROT SERPL-MCNC: 7.7 G/DL (ref 6.4–8.3)
RBC # BLD AUTO: 2.74 10E6/UL (ref 3.8–5.2)
RBC MORPH BLD: NORMAL
SODIUM SERPL-SCNC: 139 MMOL/L (ref 135–145)
WBC # BLD AUTO: 15 10E3/UL (ref 4–11)

## 2025-04-17 PROCEDURE — 36415 COLL VENOUS BLD VENIPUNCTURE: CPT | Mod: ZL

## 2025-04-17 PROCEDURE — 85027 COMPLETE CBC AUTOMATED: CPT | Mod: ZL

## 2025-04-17 PROCEDURE — 84155 ASSAY OF PROTEIN SERUM: CPT | Mod: ZL

## 2025-04-17 PROCEDURE — 85007 BL SMEAR W/DIFF WBC COUNT: CPT | Mod: ZL

## 2025-04-17 PROCEDURE — 82947 ASSAY GLUCOSE BLOOD QUANT: CPT | Mod: ZL

## 2025-04-17 RX ORDER — TRAMADOL HYDROCHLORIDE 50 MG/1
50 TABLET ORAL EVERY 6 HOURS PRN
Qty: 30 TABLET | Refills: 0 | Status: SHIPPED | OUTPATIENT
Start: 2025-04-17 | End: 2025-04-27

## 2025-04-17 NOTE — PROGRESS NOTES
"Blood and Marrow Transplant   New Transplant Visit with   Clinical     Assessment completed on 4/16/2025 in the [address] [place] [address] BMT Clinic. Information for this assessment was provided by pt and pt's Sibling report, consultation with medical team, and medical chart review.     Present:  Patient: Alejandra Villegas  Sibling : Bart Donis  : CHRIS Catherine LISW    Medical Team   Nurse Coordinator:Landy \"Roderick\" GIL Chau  BMT Physician: Wenceslao Samuel MD      Diagnosis: Acute Myeloid Leukemia (AML)  Diagnosis Date: 02/24/2025    Presenting Information:  Pt is a 59 year old female diagnosed with Acute Myeloid Leukemia (AML) . Pt was diagnosed on 02/24/2025. Pt presents for Allogeneic stem cell transplant discussion.    Contact Information:  Home Phone [phone]   Mobile [phone]   [email or web address]  Sister, Bart Donis's phone: [phone]    Special Needs:   No needs identified at this time. Patient and sister and her nephew who lives with Bart (his grandmother) will need to relocate to [place] for treatment    Relocation Requirement:     Pt lives at [address]  [facility] [address] (approximately 200 minutes from [facility]). Pt will need to relocate and will need local lodging. SW discussed relocation and explained in detail the different lodging options. Pt and and their caregiver are in agreement with relocating.    Living Situation: Alone  [address]  [place] [address]    Family Information:     Siblings: Sister, Bart Donis is her primary caregiver.Pt has a brother who lives close and another brother in [place].  Children: Patient has 4 sons.  Three of the sons live close to patient.  One son works close to her and so he stays with her Tuesday - Thursday to go to work  Grandchildren:  5 grand kids in her area  Ex-:  Supportive and helpful    Education/Employment:  Pt currently: Disabled  Employer: [employer] "       Caregiver/Sibling Employed: Full time  Occupation: Personal Care Attendant    Insurance:   Payer/Plan Subscriber Name Rel Member # Group #   MEDICARE - MEDICARE MAGGY ARNETT* Self [id number]       ATTN CLAIMS, [address]   UCARE - UCARE PMAP MAGGY ARNETT* Self [id number]       [address], [address]      No insurance concerns identified at this time. KATH provided information regarding the insurance authorization process and the role of the BMT Financial . KATH provided contact info for the [employer]  and referred pt to them for future insurance questions.     Finances:   Pt's source of income is Social Security Disability Insurance. Pt identified financial concern related to BMT including gas money and reimbursement for her sister taking off work . SW discussed elise options and asked pt to let KATH know if they would like to apply in the future.     Caregiver:   KATH discussed with the Pt and  Pt's Sibling the caregiver role and expectation at length. Pt is agreeable to having a full time caregiver for the minimum of 100 Days Caregiving, 100 days no driving until cleared by the BMT Physician. Pt's identified caregivers are Sibling. Caregiver education and information provided. No caregiver concerns identified.     Healthcare Directive:  No. SW provided education and forms. KATH encouraged pt to have discussions with their family regarding their health care wishes.  In the absence of a healthcare document, KATH discussed the [place] Policy on who would make decisions on pt's behalf if pt did not have the capacity to make healthcare decisions.     Resources Provided:  -BMT Information Book  -BMT Resources Packet  -Healthcare Directive  -Honoring Choices - Your Rights: Making Your Own Health Care Treatment Decisions  -Caregiver Contract/Description  -Transplant Unit Description and Information   -Lodging Resources    Identified Concerns:  Patient and sister and her 13 year old  grandson will need to relocate to [place].  Patient has a lot of family support in and around [place], [place] bu [place].  She will only have her sister (and nephew, age 13) as her care givers.    Summary:  Pt presents to [address] [place] [address] BMT Clinic regarding an Allogeneic  transplant. Pt and pt's Sibling asked good/appropriate questions regarding psychosocial factors related to BMT; all questions were addressed. Pt presented as coping well. Pt's affect was broad. Patient indicated they are currently feeling nervous about doing this treatment. Family's affect was broad.    Plan:   SW provided contact information and encouraged pt to contact SW with any additional questions, concerns, resources and/or for support. SW will continue to follow pt to provide support and guidance with resources as needed.     Billie Pierson, CHRIS, [place]  Adult Blood & Marrow Transplant   Phone: [phone]  [facility] Searchable at BMT SW 3

## 2025-04-17 NOTE — TELEPHONE ENCOUNTER
Per Dr. Harris Tramadol prescribed. Likleamber locketa effect. Can try claritin too   Lisha Winkler RN...........4/17/2025 10:06 AM

## 2025-04-17 NOTE — TELEPHONE ENCOUNTER
Alejandra was here for labs and states that she is in severe pain 10/10. Pain is all over. Pain radiates up her spine into her neck and caused head bobbing.  She was seen in ED She spoke with Dr. Law at [facility] who recommended she reach out to you. She would like something for pain.  Lisha Winkler RN...........4/17/2025 9:15 AM

## 2025-04-17 NOTE — TELEPHONE ENCOUNTER
Notified RN coordinator at [facility] that Alejandra has some blasts and immature cells but she thinks it is from the neulasta.

## 2025-04-17 NOTE — TELEPHONE ENCOUNTER
DATE/TIME OF CALL RECEIVED FROM LAB:  04/17/25 at 9:20 AM   LAB TEST:  platelets  LAB VALUE:  23  PROVIDER NOTIFIED?: Yes  PROVIDER NAME: Dr. Harris   DATE/TIME LAB VALUE REPORTED TO PROVIDER: 9:20 AM   MECHANISM OF PROVIDER NOTIFICATION:  phone note  PROVIDER RESPONSE: pending    Instructed patient to come to hospital immediately if bleeding from mouth or gums, nose bleeds, bruises on arms or legs, with or without an injury, pinpoint-size, red or purple spots on your skin, brown or red urine, black, tarry stool or bloody stool, blood in your mucus, vomiting blood, persistent headache, blurred or double vision, abdominal pain. Patient states understanding.

## 2025-04-19 LAB — MISCELLANEOUS TEST 1 (ARUP): NORMAL

## 2025-04-20 ENCOUNTER — HOSPITAL ENCOUNTER (EMERGENCY)
Facility: OTHER | Age: 60
Discharge: HOME | End: 2025-04-20
Attending: EMERGENCY MEDICINE
Payer: MEDICARE

## 2025-04-20 VITALS
OXYGEN SATURATION: 99 % | BODY MASS INDEX: 28.09 KG/M2 | SYSTOLIC BLOOD PRESSURE: 106 MMHG | TEMPERATURE: 98.6 F | DIASTOLIC BLOOD PRESSURE: 72 MMHG | HEIGHT: 67 IN | WEIGHT: 179 LBS | RESPIRATION RATE: 18 BRPM | HEART RATE: 100 BPM

## 2025-04-20 DIAGNOSIS — M62.838 SPASM OF LEFT PIRIFORMIS MUSCLE: ICD-10-CM

## 2025-04-20 PROCEDURE — 99283 EMERGENCY DEPT VISIT LOW MDM: CPT | Performed by: EMERGENCY MEDICINE

## 2025-04-20 PROCEDURE — 99283 EMERGENCY DEPT VISIT LOW MDM: CPT

## 2025-04-20 PROCEDURE — 250N000013 HC RX MED GY IP 250 OP 250 PS 637: Performed by: EMERGENCY MEDICINE

## 2025-04-20 RX ORDER — CYCLOBENZAPRINE HCL 10 MG
10 TABLET ORAL ONCE
Status: COMPLETED | OUTPATIENT
Start: 2025-04-20 | End: 2025-04-20

## 2025-04-20 RX ORDER — CYCLOBENZAPRINE HCL 10 MG
10 TABLET ORAL 3 TIMES DAILY PRN
Qty: 15 TABLET | Refills: 0 | Status: ON HOLD | OUTPATIENT
Start: 2025-04-20

## 2025-04-20 RX ADMIN — CYCLOBENZAPRINE HYDROCHLORIDE 10 MG: 10 TABLET, FILM COATED ORAL at 13:59

## 2025-04-20 ASSESSMENT — ACTIVITIES OF DAILY LIVING (ADL): ADLS_ACUITY_SCORE: 56

## 2025-04-20 NOTE — ED TRIAGE NOTES
Patient arrives today with c/o left hip pain that has been ongoing the last 3 days. States that she has a history sciatic pain and this feels similar to that. Does have a hx of cancer and has tramadol for pain related to that which she took around 1130 with Tylenol 325mg around 0730 which did not help her discomfort. States that heat has helped recently for discomfort. Does state that around a week ago she was seen in urgent care for the same type of pain but the pain has since worsened to the left hip.     Triage Assessment (Adult)       Row Name 04/20/25 1329          Triage Assessment    Airway WDL WDL        Respiratory WDL    Respiratory WDL WDL        Skin Circulation/Temperature WDL    Skin Circulation/Temperature WDL WDL        Cardiac WDL    Cardiac WDL WDL        Peripheral/Neurovascular WDL    Peripheral Neurovascular WDL WDL        Cognitive/Neuro/Behavioral WDL    Cognitive/Neuro/Behavioral WDL WDL

## 2025-04-20 NOTE — DISCHARGE INSTRUCTIONS
Simple stretches as shown for hamstrings and gluteal muscles.  Continue with your home medications as previously prescribed.  Hold your compazine while taking the Flexeril.  Continue with tylenol as needed.  Take flexeril for muscle spasm as directed.  Have a good week and glad you feel better!

## 2025-04-20 NOTE — ED PROVIDER NOTES
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  She presents today with 3 days of left hip and buttock pain radiating down to her left leg similar to her previous episodes of sciatica.  She has been unable to get a comfortable position despite tramadol and Tylenol.  Denies any recent trauma, lifting.  No fevers or chills.  Did have a recent CT scan done that was otherwise unremarkable for any acute pathology.  States that this happens sometimes when she gets out of her car.  No urinary or bowel complaints.  Denies difficulty walking.  Patient seen in room #8.    Allergies:  Allergies   Allergen Reactions    Adhesive Tape     Bee Pollen Swelling     Seasonal    Bee Venom Unknown    Folic Acid Itching    Pollen Extract      Seasonal    Amoxicillin Rash    Chlorhexidine Rash     After several weeks, started to develop rash on R neck down to chest and arm. Also noted on back of knees. Providers suggesting related to CHG as nothing else is new for pt.     Liquid Adhesive Rash    Meloxicam Rash    Nabumetone GI Disturbance     GI upset       Problem List:    Patient Active Problem List    Diagnosis Date Noted    Pancytopenia due to antineoplastic chemotherapy 04/06/2025     Priority: Medium    Hiatal hernia 04/06/2025     Priority: Medium    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) 03/04/2025     Priority: Medium    Anemia 02/26/2025     Priority: Medium    Leukopenia 02/26/2025     Priority: Medium    Macrocytic anemia 02/13/2025     Priority: Medium    Pulmonary emphysema, unspecified emphysema type (H) 01/23/2024     Priority: Medium    Benign essential hypertension 01/23/2024     Priority: Medium    Mixed hyperlipidemia 01/23/2024     Priority: Medium    Pain in joint, ankle and foot, left 10/05/2022      Priority: Medium    CKD (chronic kidney disease) stage 2, GFR 60-89 ml/min 03/31/2022     Priority: Medium    Osteopenia of right foot 02/03/2021     Priority: Medium    Anxiety state 01/30/2018     Priority: Medium    Other isolated or specific phobias 01/30/2018     Priority: Medium    Degenerative disc disease at L5-S1 level 01/30/2018     Priority: Medium    Hidradenitis suppurativa 01/30/2018     Priority: Medium    Tobacco use disorder 01/30/2018     Priority: Medium    Gastroesophageal reflux disease 01/18/2017     Priority: Medium    Pain management contract broken 06/05/2015     Priority: Medium     Overview:   Contract violation - see 12/1/15 letter.      Urge incontinence 06/04/2014     Priority: Medium    Alopecia areata 02/21/2014     Priority: Medium    Benign paroxysmal positional vertigo 02/28/2013     Priority: Medium    Ovarian cyst, left 05/09/2012     Priority: Medium    Other acquired deformity of ankle and foot(736.79) 05/09/2012     Priority: Medium    RA (rheumatoid arthritis) (H) 05/09/2012     Priority: Medium     Overview:   Diagnosed [place] 2012      Asthma 05/04/2012     Priority: Medium    Seasonal allergic rhinitis 09/27/2011     Priority: Medium    Symptomatic menopausal or female climacteric states 09/21/2010     Priority: Medium    Other diseases of lung, not elsewhere classified 08/01/2007     Priority: Medium        Past Medical History:    Past Medical History:   Diagnosis Date    Allergic rhinitis 09/27/2011    Disorder of kidney and ureter 08/08/2008    Dorsalgia     Generalized anxiety disorder     Hidradenitis suppurativa     Other disorders of lung (CODE) 08/01/2007    Other specified disorders of Eustachian tube, unspecified ear 02/27/2012    Personal history of other medical treatment (CODE)     Rheumatoid arthritis (H) 02/27/2012    Tobacco use        Past Surgical History:    Past Surgical History:   Procedure Laterality Date    ARTHROSCOPY KNEE  05/2017      [facility]    ARTHROSCOPY KNEE  07/20/2017    Dr Garza, lateral release, meniscal repair    ARTHROTOMY WRIST Left 2011    Dr. Torres    BONE MARROW BIOPSY, BONE SPECIMEN, NEEDLE/TROCAR Left 02/13/2025    Procedure: Bone marrow biopsy, bone specimen, needle/trocar;  Surgeon: Hema Mari MD;  Location:  OR    CHOLECYSTECTOMY  06/2007    Emergency tracheotomy following failed intubation for laparoscopic cholecystectomy.    DILATION AND CURETTAGE  09/2006         HERNIA REPAIR  2007    Incisoinal hernia repair    HYSTERECTOMY TOTAL ABDOMINAL  02/2010         IMPLANT STIMULATOR AND LEADS SACRAL NERVE (STAGE ONE AND TWO) N/A 12/11/2018    Procedure: Interstim Battery Replacement;  Surgeon: Rl Ambriz MD;  Location:  OR    INTERSTIM DEVICE STAGE 2  01/06/2014    Dr. Ambriz [facility]    LAPAROSCOPIC ABLATION ENDOMETRIOSIS  09/2006         OOPHORECTOMY Left 2010     Dr Thorpe    PICC INSERTION - DOUBLE LUMEN Right 03/05/2025    5FR, DL, total cath length=42 CM, visible cath length= 3CM, brachial medial vein    RECONSTRUCT FOREFOOT WITH METATARSOPHALANGEAL (MTP) FUSION Right 05/05/2022    Procedure: Right fibular sesmoid excision and 1st metatarsal phalangeal joint fusion;  Surgeon: Rl Nathan DPM;  Location: [facility] OR    RELEASE CARPAL TUNNEL  02/02/2017         RELEASE PLANTAR FASCIA Left 12/19/2024    Procedure: excision of soft tissue mass left foot,  gastroc recession;  Surgeon: Rl Nathan DPM;  Location:  OR    REMOVE HARDWARE FOOT Right 08/10/2023    Procedure: REMOVAL, HARDWARE, FOOT;  Surgeon: Rl Nathan DPM;  Location: [facility] OR    SALPINGO OOPHORECTOMY,R/L/KELSEY Right 06/1999    Right salpingo-oophorectomy for hemorrhagic corpus luteum cyst    TRACHEOSTOMY  2007    emergency following failed intubation during cholecystectomy    TYMPANOSTOMY, LOCAL/TOPICAL ANESTHESIA  08/2006    PE tube placement       Family History:    Family History   Problem Relation Age of Onset    Arthritis Mother          Arthritis,Rheumatoid    Cancer Mother         Cancer, lung and uterine cancer    Heart Disease Father         Heart Disease,CAD, CABG, peripheral vascular dise    Thyroid Disease Father         Thyroid Disease, hyperlipidemia    Other - See Comments Father         djd    Asthma Brother         Asthma    Asthma Sister         Asthma    Other - See Comments Sister         Psychiatric illness,Anxiety    Other - See Comments Son         x2 back surgeries    Breast Cancer No family hx of         Cancer-breast       Social History:  Marital Status:   [4]  Social History     Tobacco Use    Smoking status: Every Day     Current packs/day: 1.00     Average packs/day: 1 pack/day for 44.3 years (44.3 ttl pk-yrs)     Types: Cigarettes     Start date: 1981     Passive exposure: Past    Smokeless tobacco: Never    Tobacco comments:     Passive exposure in childhood and adult homes and some work places   Vaping Use    Vaping status: Never Used   Substance Use Topics    Alcohol use: No     Alcohol/week: 0.0 standard drinks of alcohol    Drug use: No        Medications:    acetaminophen (TYLENOL) 500 MG tablet  acyclovir (ZOVIRAX) 400 MG tablet  albuterol (PROAIR HFA/PROVENTIL HFA/VENTOLIN HFA) 108 (90 Base) MCG/ACT inhaler  budesonide-formoterol (SYMBICORT) 80-4.5 MCG/ACT Inhaler  calcium carbonate (TUMS) 500 MG chewable tablet  cyanocobalamin (VITAMIN B-12) 500 MCG tablet  cyclobenzaprine (FLEXERIL) 10 MG tablet  famotidine (PEPCID) 20 MG tablet  fluticasone (FLONASE) 50 MCG/ACT nasal spray  gabapentin (NEURONTIN) 400 MG capsule  hydrOXYzine HCl (ATARAX) 25 MG tablet  ipratropium - albuterol 0.5 mg/2.5 mg/3 mL (DUONEB) 0.5-2.5 (3) MG/3ML neb solution  levofloxacin (LEVAQUIN) 250 MG tablet  loratadine (CLARITIN) 10 MG tablet  ondansetron (ZOFRAN ODT) 4 MG ODT tab  order for DME  pantoprazole (PROTONIX) 40 MG EC tablet  PARoxetine (PAXIL) 20 MG tablet  posaconazole (NOXAFIL) 100 MG DR tablet  prednisoLONE acetate (PRED  "FORTE) 1 % ophthalmic suspension  prochlorperazine (COMPAZINE) 5 MG tablet  rosuvastatin (CRESTOR) 20 MG tablet  sucralfate (CARAFATE) 1 GM/10ML suspension  SUMAtriptan (IMITREX) 50 MG tablet  traMADol (ULTRAM) 50 MG tablet  Vitamin D, Cholecalciferol, 25 MCG (1000 UT) CAPS          Review of Systems   All other systems reviewed and are negative.      Physical Exam   BP: 106/72  Pulse: 100  Temp: 98.6  F (37  C)  Resp: 18  Height: 170.2 cm (5' 7\")  Weight: 81.2 kg (179 lb)  SpO2: 99 %      Physical Exam  Vitals and nursing note reviewed. Exam conducted with a chaperone present.   Constitutional:       Appearance: Normal appearance. She is not ill-appearing.      Comments: Laying on her right side with holding her left hip and buttock region.  Tearful   HENT:      Head: Normocephalic.   Cardiovascular:      Rate and Rhythm: Normal rate.      Pulses: Normal pulses.   Pulmonary:      Effort: Pulmonary effort is normal.   Abdominal:      General: Abdomen is flat.      Palpations: Abdomen is soft.   Musculoskeletal:         General: Tenderness present. No swelling, deformity or signs of injury.      Cervical back: Normal range of motion and neck supple.      Right lower leg: No edema.      Left lower leg: No edema.        Legs:       Comments: Left piriformis distinct muscle spasm on palpation.  She has tenderness in this region as well and with simple strain counterstrain,, point tenderness manipulation, pain markedly improved.  Normal distal neurovascular findings.   Skin:     General: Skin is warm.   Neurological:      General: No focal deficit present.      Mental Status: She is alert and oriented to person, place, and time.   Psychiatric:         Mood and Affect: Mood normal.         Behavior: Behavior normal.         ED Course   Simple strain counterstrain and direct manipulation of the left piriformis muscle spasm with significant improvement in her pain post.  She was put in mild left lumbar rotation to relax the " lower lumbar musculature as well.  She had mild lumbar spasm.  Review of recent CT scan done on 4/14/2025 was otherwise reviewed assuring.  There was no obvious acute findings she does have a consistent sized aortic aneurysm.  No evidence of more sinister etiology at this time.  Patient is comfortable being discharged home on Flexeril with stretching exercises and techniques shown to her.  She does appear to understand discharge instructions as well as return precautions.     Procedures         No results found for this or any previous visit (from the past 24 hours).    Medications   cyclobenzaprine (FLEXERIL) tablet 10 mg (10 mg Oral $Given 4/20/25 1355)       Assessments & Plan (with Medical Decision Making)     I have reviewed the nursing notes.    I have reviewed the findings, diagnosis, plan and need for follow up with the patient.      New Prescriptions    CYCLOBENZAPRINE (FLEXERIL) 10 MG TABLET    Take 1 tablet (10 mg) by mouth 3 times daily as needed for muscle spasms.       Final diagnoses:   Spasm of left piriformis muscle       4/20/2025   [facility]       Katelyn Persaud DO  04/20/25 1955

## 2025-04-21 ENCOUNTER — LAB (OUTPATIENT)
Dept: LAB | Facility: OTHER | Age: 60
End: 2025-04-21
Payer: MEDICARE

## 2025-04-21 DIAGNOSIS — C92.00 ACUTE MYELOID LEUKEMIA NOT HAVING ACHIEVED REMISSION (H): ICD-10-CM

## 2025-04-21 LAB
ALBUMIN SERPL BCG-MCNC: 3.5 G/DL (ref 3.5–5.2)
ALP SERPL-CCNC: 184 U/L (ref 40–150)
ALT SERPL W P-5'-P-CCNC: 18 U/L (ref 0–50)
ANION GAP SERPL CALCULATED.3IONS-SCNC: 13 MMOL/L (ref 7–15)
AST SERPL W P-5'-P-CCNC: 20 U/L (ref 0–45)
BASOPHILS # BLD MANUAL: 0 10E3/UL (ref 0–0.2)
BASOPHILS NFR BLD MANUAL: 0 %
BILIRUB SERPL-MCNC: 0.5 MG/DL
BUN SERPL-MCNC: 8.5 MG/DL (ref 6–20)
CALCIUM SERPL-MCNC: 9.2 MG/DL (ref 8.8–10.4)
CHLORIDE SERPL-SCNC: 103 MMOL/L (ref 98–107)
CREAT SERPL-MCNC: 0.7 MG/DL (ref 0.51–0.95)
EGFRCR SERPLBLD CKD-EPI 2021: >90 ML/MIN/1.73M2
EOSINOPHIL # BLD MANUAL: 0.1 10E3/UL (ref 0–0.7)
EOSINOPHIL NFR BLD MANUAL: 1 %
ERYTHROCYTE [DISTWIDTH] IN BLOOD BY AUTOMATED COUNT: 16 % (ref 10–15)
FLOWPRA1 CELL: NORMAL
FLOWPRA1 COMMENTS: NORMAL
FLOWPRA1 RESULT: NORMAL
FLOWPRA1 TEST METHOD: NORMAL
FLOWPRA2 CELL: NORMAL
FLOWPRA2 COMMENTS: NORMAL
FLOWPRA2 RESULT: NORMAL
FLOWPRA2 TEST METHOD: NORMAL
GLUCOSE SERPL-MCNC: 123 MG/DL (ref 70–99)
HCO3 SERPL-SCNC: 22 MMOL/L (ref 22–29)
HCT VFR BLD AUTO: 21.6 % (ref 35–47)
HGB BLD-MCNC: 7.2 G/DL (ref 11.7–15.7)
LYMPHOCYTES # BLD MANUAL: 1 10E3/UL (ref 0.8–5.3)
LYMPHOCYTES NFR BLD MANUAL: 9 %
MCH RBC QN AUTO: 30.6 PG (ref 26.5–33)
MCHC RBC AUTO-ENTMCNC: 33.3 G/DL (ref 31.5–36.5)
MCV RBC AUTO: 92 FL (ref 78–100)
MONOCYTES # BLD MANUAL: 2.2 10E3/UL (ref 0–1.3)
MONOCYTES NFR BLD MANUAL: 20 %
NEUTROPHILS # BLD MANUAL: 7.6 10E3/UL (ref 1.6–8.3)
NEUTROPHILS NFR BLD MANUAL: 70 %
PLAT MORPH BLD: ABNORMAL
PLATELET # BLD AUTO: 441 10E3/UL (ref 150–450)
POTASSIUM SERPL-SCNC: 3.6 MMOL/L (ref 3.4–5.3)
PROT SERPL-MCNC: 7.3 G/DL (ref 6.4–8.3)
RBC # BLD AUTO: 2.35 10E6/UL (ref 3.8–5.2)
RBC MORPH BLD: ABNORMAL
SA 1  COMMENTS: NORMAL
SA 1 CELL: NORMAL
SA 1 TEST METHOD: NORMAL
SA 2 CELL: NORMAL
SA 2 COMMENTS: NORMAL
SA 2 TEST METHOD: NORMAL
SA1 HI RISK ABY: NORMAL
SA1 MOD RISK ABY: NORMAL
SA2 HI RISK ABY: NORMAL
SA2 MOD RISK ABY: NORMAL
SODIUM SERPL-SCNC: 138 MMOL/L (ref 135–145)
SPHEROCYTES BLD QL SMEAR: SLIGHT
WBC # BLD AUTO: 10.9 10E3/UL (ref 4–11)

## 2025-04-21 PROCEDURE — 85014 HEMATOCRIT: CPT | Mod: ZL

## 2025-04-21 PROCEDURE — 36415 COLL VENOUS BLD VENIPUNCTURE: CPT | Mod: ZL

## 2025-04-21 PROCEDURE — 82310 ASSAY OF CALCIUM: CPT | Mod: ZL

## 2025-04-21 PROCEDURE — 85007 BL SMEAR W/DIFF WBC COUNT: CPT | Mod: ZL

## 2025-04-22 ENCOUNTER — PATIENT OUTREACH (OUTPATIENT)
Dept: ONCOLOGY | Facility: OTHER | Age: 60
End: 2025-04-22
Payer: MEDICARE

## 2025-04-22 NOTE — PROGRESS NOTES
[facility]: Transitions of Care Note  Chief Complaint   Patient presents with    Clinic Care Coordination - Post Hospital       Most Recent Admission Date: 4/20/2025   Most Recent Admission Diagnosis:      Most Recent Discharge Date: 4/20/2025   Most Recent Discharge Diagnosis: Spasm of left piriformis muscle - M62.838     Transitions of Care Assessment    Discharge Assessment  How are your symptoms? (Red Flag symptoms escalate to triage hotline per guidelines): Improved  Do you know how to contact your clinic care team if you have future questions or changes to your health status? : Yes  Does the patient have their discharge instructions? : Yes  Does the patient have questions regarding their discharge instructions? : No  Were you started on any new medications or were there changes to any of your previous medications? : Yes  Does the patient have all of their medications?: Yes  Do you have questions regarding any of your medications? : No  Do you have all of your needed medical supplies or equipment (DME)?  (i.e. oxygen tank, CPAP, cane, etc.): Yes         Cancer Care  RNCC Short Symptom Review:     Assessment completed with:: Patient    General/Short Assessment  Does the patient have all their medications?: Yes  Is the patient experiencing any new or worsening symptoms?: Yes, See comments  Does the patient need any referrals to supportive care services?: No  Discussion with patient: Answered patient's questions and addressed concerns, Reviewed how and when to contact clinic, Reviewed patient's future appointments    Pain  Patient Reported Pain?: Yes, but is new or different pain  Pain Score: Severe Pain (8)  Pain Loc: Abdomen    Patient Coping  Accepting    Clinic Utilization  Patient Aware of Next Appointment?: Yes    Other Patient Concerns  Other Patient Reported Concerns: No    Follow up Plan     Discharge Follow-Up  Discharge follow up appointment scheduled in alignment with recommended follow up  timeframe or Transitions of Risk Category? (Low = within 30 days; Moderate= within 14 days; High= within 7 days): Yes  Discharge Follow Up Appointment Date: 04/28/25  Discharge Follow Up Appointment Scheduled with?: Specialty Care Provider    Future Appointments   Date Time Provider Department [place]   [facility] [place]   4/28/2025  1:00 PM Samina Harris MD [facility] [place]       Outpatient Plan as outlined on AVS reviewed with patient.    For any urgent concerns, please contact our 24 hour clinic line:        She reports that she has not had a bowel movement for about 5 days. She did take miralax.  Advised if no bowel by tonight needs to go to ED or be seen in clinic.  Lisha Winkler RN

## 2025-04-24 ENCOUNTER — LAB (OUTPATIENT)
Dept: LAB | Facility: OTHER | Age: 60
End: 2025-04-24
Attending: INTERNAL MEDICINE
Payer: MEDICARE

## 2025-04-24 DIAGNOSIS — C92.00 ACUTE MYELOID LEUKEMIA NOT HAVING ACHIEVED REMISSION (H): ICD-10-CM

## 2025-04-24 LAB
ALBUMIN SERPL BCG-MCNC: 3.6 G/DL (ref 3.5–5.2)
ALP SERPL-CCNC: 238 U/L (ref 40–150)
ALT SERPL W P-5'-P-CCNC: 26 U/L (ref 0–50)
ANION GAP SERPL CALCULATED.3IONS-SCNC: 14 MMOL/L (ref 7–15)
AST SERPL W P-5'-P-CCNC: 26 U/L (ref 0–45)
BASOPHILS # BLD AUTO: 0 10E3/UL (ref 0–0.2)
BASOPHILS NFR BLD AUTO: 1 %
BILIRUB SERPL-MCNC: 0.4 MG/DL
BUN SERPL-MCNC: 6.8 MG/DL (ref 6–20)
CALCIUM SERPL-MCNC: 9.5 MG/DL (ref 8.8–10.4)
CHLORIDE SERPL-SCNC: 104 MMOL/L (ref 98–107)
CREAT SERPL-MCNC: 0.74 MG/DL (ref 0.51–0.95)
EGFRCR SERPLBLD CKD-EPI 2021: >90 ML/MIN/1.73M2
EOSINOPHIL # BLD AUTO: 0 10E3/UL (ref 0–0.7)
EOSINOPHIL NFR BLD AUTO: 1 %
ERYTHROCYTE [DISTWIDTH] IN BLOOD BY AUTOMATED COUNT: 16.6 % (ref 10–15)
GLUCOSE SERPL-MCNC: 122 MG/DL (ref 70–99)
HCO3 SERPL-SCNC: 25 MMOL/L (ref 22–29)
HCT VFR BLD AUTO: 23.1 % (ref 35–47)
HGB BLD-MCNC: 7.6 G/DL (ref 11.7–15.7)
IMM GRANULOCYTES # BLD: 0 10E3/UL
IMM GRANULOCYTES NFR BLD: 1 %
LYMPHOCYTES # BLD AUTO: 0.4 10E3/UL (ref 0.8–5.3)
LYMPHOCYTES NFR BLD AUTO: 7 %
MCH RBC QN AUTO: 30.3 PG (ref 26.5–33)
MCHC RBC AUTO-ENTMCNC: 32.9 G/DL (ref 31.5–36.5)
MCV RBC AUTO: 92 FL (ref 78–100)
MONOCYTES # BLD AUTO: 1.3 10E3/UL (ref 0–1.3)
MONOCYTES NFR BLD AUTO: 22 %
NEUTROPHILS # BLD AUTO: 3.9 10E3/UL (ref 1.6–8.3)
NEUTROPHILS NFR BLD AUTO: 69 %
NRBC # BLD AUTO: 0.1 10E3/UL
NRBC BLD AUTO-RTO: 1 /100
PLATELET # BLD AUTO: 698 10E3/UL (ref 150–450)
POTASSIUM SERPL-SCNC: 3.7 MMOL/L (ref 3.4–5.3)
PROT SERPL-MCNC: 7.5 G/DL (ref 6.4–8.3)
RBC # BLD AUTO: 2.51 10E6/UL (ref 3.8–5.2)
SODIUM SERPL-SCNC: 143 MMOL/L (ref 135–145)
WBC # BLD AUTO: 5.6 10E3/UL (ref 4–11)

## 2025-04-24 PROCEDURE — 36415 COLL VENOUS BLD VENIPUNCTURE: CPT | Mod: ZL

## 2025-04-24 PROCEDURE — 85025 COMPLETE CBC W/AUTO DIFF WBC: CPT | Mod: ZL

## 2025-04-24 PROCEDURE — 84155 ASSAY OF PROTEIN SERUM: CPT | Mod: ZL

## 2025-04-27 ENCOUNTER — APPOINTMENT (OUTPATIENT)
Dept: GENERAL RADIOLOGY | Facility: OTHER | Age: 60
End: 2025-04-27
Attending: PHYSICIAN ASSISTANT
Payer: MEDICARE

## 2025-04-27 ENCOUNTER — HOSPITAL ENCOUNTER (INPATIENT)
Facility: OTHER | Age: 60
LOS: 1 days | Discharge: HOME | End: 2025-04-28
Attending: PHYSICIAN ASSISTANT | Admitting: INTERNAL MEDICINE
Payer: MEDICARE

## 2025-04-27 PROBLEM — D63.8 ANEMIA OF CHRONIC DISEASE: Status: ACTIVE | Noted: 2025-04-27

## 2025-04-27 PROBLEM — J18.9 COMMUNITY ACQUIRED PNEUMONIA OF LEFT LOWER LOBE OF LUNG: Status: ACTIVE | Noted: 2025-04-27

## 2025-04-27 ASSESSMENT — ACTIVITIES OF DAILY LIVING (ADL)
ADLS_ACUITY_SCORE: 56

## 2025-04-27 ASSESSMENT — COLUMBIA-SUICIDE SEVERITY RATING SCALE - C-SSRS
6. HAVE YOU EVER DONE ANYTHING, STARTED TO DO ANYTHING, OR PREPARED TO DO ANYTHING TO END YOUR LIFE?: NO
2. HAVE YOU ACTUALLY HAD ANY THOUGHTS OF KILLING YOURSELF IN THE PAST MONTH?: NO
1. IN THE PAST MONTH, HAVE YOU WISHED YOU WERE DEAD OR WISHED YOU COULD GO TO SLEEP AND NOT WAKE UP?: NO

## 2025-04-28 ENCOUNTER — PATIENT OUTREACH (OUTPATIENT)
Dept: ONCOLOGY | Facility: OTHER | Age: 60
End: 2025-04-28
Payer: MEDICARE

## 2025-04-28 ENCOUNTER — ONCOLOGY VISIT (OUTPATIENT)
Dept: ONCOLOGY | Facility: OTHER | Age: 60
End: 2025-04-28
Attending: INTERNAL MEDICINE
Payer: MEDICARE

## 2025-04-28 VITALS
SYSTOLIC BLOOD PRESSURE: 148 MMHG | WEIGHT: 181 LBS | BODY MASS INDEX: 29.21 KG/M2 | OXYGEN SATURATION: 94 % | TEMPERATURE: 100 F | RESPIRATION RATE: 28 BRPM | HEART RATE: 94 BPM | DIASTOLIC BLOOD PRESSURE: 82 MMHG

## 2025-04-28 DIAGNOSIS — C92.00 ACUTE MYELOID LEUKEMIA NOT HAVING ACHIEVED REMISSION (H): Primary | ICD-10-CM

## 2025-04-28 PROBLEM — E87.6 HYPOKALEMIA: Status: ACTIVE | Noted: 2025-04-28

## 2025-04-28 PROCEDURE — G0463 HOSPITAL OUTPT CLINIC VISIT: HCPCS

## 2025-04-28 RX ORDER — GUAIFENESIN 600 MG/1
1200 TABLET, EXTENDED RELEASE ORAL 2 TIMES DAILY
Qty: 30 TABLET | Refills: 0 | Status: SHIPPED | OUTPATIENT
Start: 2025-04-28

## 2025-04-28 ASSESSMENT — ACTIVITIES OF DAILY LIVING (ADL)
ADLS_ACUITY_SCORE: 36

## 2025-04-28 ASSESSMENT — PAIN SCALES - GENERAL: PAINLEVEL_OUTOF10: MODERATE PAIN (6)

## 2025-04-28 NOTE — PHARMACY - DISCHARGE MEDICATION RECONCILIATION AND EDUCATION
Pharmacy:  Discharge Counseling and Medication Reconciliation    Alejandra Villegas  [address]  [place] [address]  [phone] (home)   59 year old female  PCP: No Ref-Primary, Physician    Allergies: Adhesive tape, Bee pollen, Bee venom, Folic acid, Pollen extract, Amoxicillin, Chlorhexidine, Liquid adhesive, Meloxicam, and Nabumetone    Discharge Counseling:    Pharmacist met with patient (and/or family) today to review the medication portion of the After Visit Summary (with an emphasis on NEW medications) and to address patient's questions/concerns.    Summary of Education: Did not meet with patient as she discharged imminently after discharge prescriptions were sent.     Materials Provided:  MedCounselor sheets printed from Clinical Pharmacology on: N/A    Discharge Medication Reconciliation:    It has been determined that the patient has an adequate supply of medications available or which can be obtained from the patient's preferred pharmacy, which HE/SHE has confirmed as: [facility]    Thank you for the consult.    Karishma Wilson RPH........April 28, 2025 9:00 AM

## 2025-04-28 NOTE — DISCHARGE SUMMARY
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    Weight as of this encounter: 81.7 kg (180 lb 3.2 oz).       Follow-ups Needed After Discharge   Follow-up Appointments       Follow-up and recommended labs and tests       Follow up with primary care provider if needed for hospital follow- up.  No follow up labs or test are needed.                Unresulted Labs Ordered in the Past 30 Days of this Admission       Date and Time Order Name Status Description    4/27/2025 11:03 PM Prepare red blood cells (unit) Preliminary     4/27/2025 11:03 PM Prepare red blood cells (unit) Preliminary     4/27/2025 10:12 PM Prepare red blood cells (unit) Preliminary     4/27/2025  8:43 PM Blood Culture Peripheral Blood In process     4/27/2025  8:43 PM Blood Culture Peripheral Blood In process         These results will be followed up by PCP or oncology.    Discharge Disposition   Discharged to home  Condition at discharge: Good    Hospital Course   Principal Problem:    Community acquired pneumonia of left lower lobe of lung    Assessment: Improving    Plan: Patient adamant about being discharged immediately.  Not requiring supplemental O2.  Treated with Rocephin and Zithromax.  Will complete 7 days of cefdinir and complete a 5 day course of azithromycin.  Follow-up with her " PCP in 1 week, sooner if needed.  In light of her immunosuppressed status may need a repeat CXR to assure clearance in about 1 month.    Active Problems:    Pulmonary emphysema, unspecified emphysema type (H)    Assessment: Stable    Plan: Now with superimposed pneumonia.  Continue Symbicort, DuoNebs and albuterol as needed.  Not adding steroids as she does not have wheezing.      Benign essential hypertension    Assessment: Controlled    Plan: Mostly controlled with no regular medications.  Follow with PCP.      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 Harris today that she did not mention.  Continue to follow with oncology as directed.      Anemia of chronic disease    Assessment: Worse    Plan: Hgb 6.6 so transfused 1 unit pRBC's with improvement to 9.1.  Ready for discharge right away so got to her follow-up appointment with oncology today.      Hypokalemia    Assessment: Stable    Plan: Given oral replacement without improvement overnight.  Repeated the morning of discharge but patient demanded to be discharged prior to a recheck.  Follow-up with PCP.    Consultations This Hospital Stay   PHYSICAL THERAPY ADULT IP CONSULT    Code Status   No CPR- Do NOT Intubate    Time Spent on this Encounter   I, Halle Purdy MD, personally saw the patient today and spent less than or equal to 30 minutes discharging this patient.       Halle Purdy MD  [facility]  [address]  [place] [address]  Phone: [phone]  Fax: [phone]  ______________________________________________________________________    Physical Exam   Vital Signs: Temp: 99.8  F (37.7  C) Temp src: Tympanic BP: (!) 147/79 Pulse: 92   Resp: 20 SpO2: 95 % O2 Device: None (Room air)    Weight: 180 lbs 3.2 oz  Constitutional: awake, alert, cooperative, no apparent distress, appears older than stated age, and  moderately obese  Eyes: pupils equal, round and reactive to light, extra-ocular muscles intact, and conjunctiva normal  ENT: normocephalic, atraumatic  Respiratory: no increased work of breathing, decreased air exchange, and clear to auscultation  Cardiovascular: regular rate and rhythm, normal S1 and S2 with a 2/6 systolic murmur noted throughout the precordium  GI: normal bowel sounds, soft, non-distended, and non-tender  Skin: alopecia, normal skin color, texture, turgor, and no redness, warmth, or swelling  Musculoskeletal: there is no redness, warmth, or swelling of the joints  full range of motion noted  motor strength is 5 out of 5 all extremities bilaterally  Neurologic: Mental Status Exam:  Fund of Knowledge:  normal  Attention/Concentration:  normal  Language:  normal  Cranial Nerves:  cranial nerves II-XII are grossly intact  Motor Exam:  Motor exam is symmetrical 5 out of 5 all extremities bilaterally  Neuropsychiatric: General: normal  Level of consciousness: alert / normal  Affect: angry  Orientation: oriented to self, place, time and situation  Memory and insight: normal, memory for past and recent events intact, and thought process normal       Primary Care Physician   Physician No Ref-Primary    Discharge Orders      Reason for your hospital stay    Pneumonia, anemia.     Follow-up and recommended labs and tests     Follow up with primary care provider if needed for hospital follow- up.  No follow up labs or test are needed.     Activity    Your activity upon discharge: activity as tolerated     Diet    Follow this diet upon discharge: Current Diet:Orders Placed This Encounter      Combination Diet Regular Diet Adult       Significant Results and Procedures   Most Recent 3 CBC's:  Recent Labs   Lab Test 04/28/25  0620 04/27/25  2140 04/24/25  0928   WBC 6.7 5.8 5.6   HGB 9.1* 6.6* 7.6*   MCV 91 93 92    365 698*     Most Recent 3 BMP's:  Recent Labs   Lab Test 04/28/25  0620 04/27/25  2048  04/24/25  0928    138 143   POTASSIUM 3.2* 3.2* 3.7   CHLORIDE 103 98 104   CO2 26 24 25   BUN 12.7 10.8 6.8   CR 0.78 0.90 0.74   ANIONGAP 11 16* 14   TOBIN 8.8 8.4* 9.5   * 130* 122*     Most Recent 2 LFT's:  Recent Labs   Lab Test 04/27/25 2048 04/24/25  0928   AST 40 26   ALT 28 26   ALKPHOS 167* 238*   BILITOTAL 0.4 0.4     7-Day Micro Results       Collected Updated Procedure Result Status      04/27/2025 2100 04/27/2025 2206 Influenza A/B, RSV and SARS-CoV2 PCR (COVID-19) Nose [[id number]]    Swab from Nose    Final result Component Value   Influenza A PCR Negative   Influenza B PCR Negative   RSV PCR Negative   SARS CoV2 PCR Negative   NEGATIVE: SARS-CoV-2 (COVID-19) RNA not detected, presumed negative.            04/27/2025 2055 04/28/2025 0916 Blood Culture Peripheral Blood [[id number]]   Peripheral Blood    Preliminary result Component Value   Culture No growth after 12 hours  [P]                04/27/2025 2048 04/28/2025 0901 Blood Culture Peripheral Blood [[id number]]   Peripheral Blood    Preliminary result Component Value   Culture No growth after 12 hours  [P]                    ,   Results for orders placed or performed during the hospital encounter of 04/27/25   XR Chest 2 Views    Narrative    EXAM: XR CHEST 2 VIEWS  LOCATION: [facility]  DATE: 4/27/2025    INDICATION: Fever, cough, immunocompromised.  COMPARISON: CT 4/14/2025.      Impression    IMPRESSION: Small area of new infiltrate versus atelectasis left lung base. No consolidation. Right lung is clear. Normal heart size. Mild pulmonary venous hypertension. No pleural fluid. Aortic calcification. Osseous structures unremarkable.        Discharge Medications   Current Discharge Medication List        START taking these medications    Details   azithromycin (ZITHROMAX) 250 MG tablet Take 1 tablet (250 mg) by mouth daily for 4 days.  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      acyclovir (ZOVIRAX) 400 MG tablet Take 1 tablet (400 mg) by mouth 2 times daily.  Qty: 60 tablet, Refills: 0    Associated Diagnoses: 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)      albuterol (PROAIR HFA/PROVENTIL HFA/VENTOLIN HFA) 108 (90 Base) MCG/ACT inhaler Inhale 1-2 puffs into the lungs every 4 hours as needed for shortness of breath, wheezing or cough.  Qty: 18 g, Refills: 4    Comments: Pharmacy may dispense brand covered by insurance (Proair, or proventil or ventolin or generic albuterol inhaler)  Associated Diagnoses: COPD exacerbation (H); Pulmonary emphysema, unspecified emphysema type (H)      budesonide-formoterol (SYMBICORT) 80-4.5 MCG/ACT Inhaler Inhale 2 puffs into the lungs 2 times daily - Rinse mouth well after use to prevent Thrush  Qty: 10.2 g, Refills: 11    Associated Diagnoses: COPD exacerbation (H); Pulmonary emphysema, unspecified emphysema type (H)      calcium carbonate (TUMS) 500 MG chewable tablet Take 1 tablet (500 mg) by mouth 3 times daily as needed for heartburn.  Qty: 90 tablet, Refills: 0    Associated Diagnoses: Gastroesophageal reflux disease, unspecified whether esophagitis present      cyanocobalamin (VITAMIN B-12) 500 MCG tablet Take 500 mcg by mouth daily.      cyclobenzaprine (FLEXERIL) 10 MG tablet Take 1 tablet (10 mg) by mouth 3 times daily as needed for muscle spasms.  Qty: 15 tablet, Refills: 0       famotidine (PEPCID) 20 MG tablet Take 1 tablet (20 mg) by mouth 2 times daily.  Qty: 60 tablet, Refills: 3    Associated Diagnoses: Gastroesophageal reflux disease, unspecified whether esophagitis present      fluticasone (FLONASE) 50 MCG/ACT nasal spray Spray 2 sprays into both nostrils 2 times daily.  Qty: 15.8 mL, Refills: 0    Comments: QS, 1 bottle  Associated Diagnoses: Dysfunction of both eustachian tubes; Rhinitis, unspecified type      gabapentin (NEURONTIN) 400 MG capsule Take 1 capsule (400 mg) by mouth 3 times daily.  Qty: 90 capsule, Refills: 0    Associated Diagnoses: 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)      hydrOXYzine HCl (ATARAX) 25 MG tablet Take 1-2 tablets (25-50 mg) by mouth every 6 hours as needed for anxiety or itching (adjuvant pain, sleep).  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 Start on hospital discharge; continue through soo (low point in blood counts) until ANC >1.0, or as otherwise instructed by your oncology team.  Qty: 30 tablet, Refills: 3    Associated Diagnoses: 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)      loratadine (CLARITIN) 10 MG tablet Take 1 tablet (10 mg) by mouth daily.  Qty: 30 tablet, Refills: 0    Associated Diagnoses: 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)      ondansetron (ZOFRAN ODT) 4 MG ODT tab Take 1-2 tablets (4-8 mg) by mouth every 8 hours as needed for nausea or vomiting.  Qty: 45 tablet, Refills: 3    Associated Diagnoses: Nausea and  vomiting, unspecified vomiting type      order for DME Equipment being ordered: home neb set up with mask and tubing  Qty: 1 Device, Refills: 0    Associated Diagnoses: Bronchitis, asthmatic, mild persistent, with acute exacerbation      pantoprazole (PROTONIX) 40 MG EC tablet Take 1 tablet (40 mg) by mouth 2 times daily (before meals).  Qty: 60 tablet, Refills: 3    Associated Diagnoses: Gastroesophageal reflux disease, unspecified whether esophagitis present      PARoxetine (PAXIL) 20 MG tablet TAKE 1 TABLET (20 MG) BY MOUTH EVERY MORNING  Qty: 90 tablet, Refills: 0    Associated Diagnoses: Chronic anxiety      posaconazole (NOXAFIL) 100 MG DR tablet Take 3 tablets (300 mg) by mouth every morning.  Qty: 90 tablet, Refills: 3    Associated Diagnoses: 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)      prednisoLONE acetate (PRED FORTE) 1 % ophthalmic suspension Place 2 drops into both eyes 4 times daily. Please continue through Monday, 4/7.  Qty: 5 mL, Refills: 0    Associated Diagnoses: 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)      prochlorperazine (COMPAZINE) 5 MG tablet Take 1 tablet (5 mg) by mouth every 6 hours as needed for nausea or vomiting.  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  Qty: 30 tablet, Refills: 3    Associated Diagnoses: Mixed hyperlipidemia      sucralfate (CARAFATE) 1 GM/10ML suspension Take 10 mLs (1 g) by mouth 4 times daily (before meals and nightly).  Qty: 414 mL, Refills: 0    Associated Diagnoses: Gastroesophageal reflux disease, unspecified whether esophagitis present      SUMAtriptan (IMITREX) 50 MG tablet Take 1 tablet (50 mg) by mouth at onset of headache for migraine May repeat in 2 hours. Max 4 tablets/24 hours.  Qty: 9 tablet, Refills: 1    Associated Diagnoses: Migraine without aura and without status  migrainosus, not intractable      Vitamin D, Cholecalciferol, 25 MCG (1000 UT) CAPS Take 1,000 Units by mouth daily.  Qty: 90 capsule, Refills: 1    Associated Diagnoses: Vitamin D deficiency           STOP taking these medications       traMADol (ULTRAM) 50 MG tablet Comments:   Reason for Stopping:             Allergies   Allergies   Allergen Reactions    Adhesive Tape     Bee Pollen Swelling     Seasonal    Bee Venom Unknown    Folic Acid Itching    Pollen Extract      Seasonal    Amoxicillin Rash    Chlorhexidine Rash     After several weeks, started to develop rash on R neck down to chest and arm. Also noted on back of knees. Providers suggesting related to CHG as nothing else is new for pt.     Liquid Adhesive Rash    Meloxicam Rash    Nabumetone GI Disturbance     GI upset

## 2025-04-28 NOTE — PROGRESS NOTES
Preventing Falls in the Hospital (02:42)  Your health professional recommends that you watch this short online health video.  Find out why you're at risk for falling in the hospital and how to prevent falls.   Purpose: Understand what increases a person's risk of falling in the hospital and how to prevent falls.  Goal: Understand what increases a person's risk of falling in the hospital and how to prevent falls.    Watch: Scan the QR code or visit the link to view video       https://hwi.se/[email or web address]  Current as of: July 17, 2023  Content Version: 14.2 2024 [facility] [employer].   Care instructions adapted under license by your healthcare professional. If you have questions about a medical condition or this instruction, always ask your healthcare professional. [employer] disclaims any warranty or liability for your use of this information.  Preventing Falls in the Hospital

## 2025-04-28 NOTE — Clinical Note
Reddy Herr,  I am following Alejandra locally.  Just wanted to let you know she was admitted for pneumonia on 4/27/2025.  I saw her today.  Still recovering.  She is scheduled to be admitted on 5/1/2025 for her next cycle of HiDAC however she will be on antibiotics still completing her antibiotics at the time for her pneumonia.

## 2025-04-28 NOTE — PROVIDER NOTIFICATION
04/28/25 0014   Valuables   Patient Belongings remains with patient   Patient Belongings Remaining with Patient cell phone/electronics;clothing;dental appliance;vision aids;jewelry  (watch)   Did you bring any home meds/supplements to the hospital?  No     A               Admission:  I am responsible for any personal items that are not sent to the safe or pharmacy.  [facility] is not responsible for loss, theft or damage of any property in my possession.    Signature:  _________________________________ Date: _______  Time: _____                                              Staff Signature:  ____________________________ Date: ________  Time: _____      2nd Staff person, if patient is unable/unwilling to sign:    Signature: ________________________________ Date: ________  Time: _____     Discharge:  [facility] has returned all of my personal belongings:    Signature: _________________________________ Date: ________  Time: _____                                          Staff Signature:  ____________________________ Date: ________  Time: _____

## 2025-04-28 NOTE — PROGRESS NOTES
"NS ADMISSION NOTE    Patient admitted to room 355 at approximately 0007 via bed from emergency room. Patient was accompanied by nurse.     Verbal SBAR report received from Wendy prior to patient arrival.     Patient ambulated to bed with one assist. Patient alert and oriented X 3. The patient is not having any pain.  . Admission vital signs: Blood pressure 115/63, pulse 73, temperature (!) 96.2  F (35.7  C), temperature source Tympanic, resp. rate 20, height 1.676 m (5' 6\"), weight 81.7 kg (180 lb 3.2 oz), last menstrual period 01/01/2007, SpO2 95%, not currently breastfeeding. Patient was oriented to plan of care, call light, bed controls, tv, telephone, bathroom, and visiting hours.     Risk Assessment    The following safety risks were identified during admission: fall. Yellow risk band applied: YES.       Education    Patient has a [place] to Observation order: No  Observation education completed and documented: N/A      Britney Naik RN      "

## 2025-04-28 NOTE — PLAN OF CARE
"    VSS. Afebrile. Headache, tylenol given. Alert and orientated. Patient demanding to discharge home. Dr. Purdy notified.       BP (!) 147/79 (BP Location: Left arm)   Pulse 92   Temp 99.8  F (37.7  C) (Tympanic)   Resp 20   Ht 1.676 m (5' 6\")   Wt 81.7 kg (180 lb 3.2 oz)   LMP 01/01/2007 (Approximate)   SpO2 95%   BMI 29.09 kg/m        Korina Rowe RN on 4/28/2025 at 8:41 AM    "

## 2025-04-28 NOTE — ED PROVIDER NOTES
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  She states she was running errands with her son this afternoon and started feeling tired and shaky.  She went home to take a nap and when she woke up she felt warm.  She took her temperature and it was 102  F.  She was told to go in and get checked out if she were ever to have a fever since she is on chemotherapy.  Her last dose of chemo was and 1/2 weeks ago.  She used to for another round of chemo in about 4 days.  She states she has had a dry cough for the past week and a half.  At times she will cough so hard it is hard for her to catch her breath.  She also will have headaches due to her coughing.  She also feels like her tongue is sore and she is having hard time eating and drinking due to the pain.  She has not been exposed anybody that has been sick.  She denies ear pain, nasal congestion, sore throat, chest pain, abdominal pain, nausea, vomiting, diarrhea, dysuria, hematuria, urgency, frequency.  Denies rash.      Past Medical History:   Past Medical History:   Diagnosis Date    Allergic rhinitis 09/27/2011         Disorder of kidney and ureter 08/08/2008    Kidney disease GFR 87    Dorsalgia     No Comments Provided    Generalized anxiety disorder     No Comments Provided    Hidradenitis suppurativa     No Comments Provided    Other disorders of lung (CODE) 08/01/2007    Pulmonary nodules, stable on follow-up chest CT 12/08.  No further imaging recommended.    Other specified disorders of Eustachian tube, unspecified ear 02/27/2012         Personal history of other medical treatment (CODE)     Childbirth x4    Rheumatoid  arthritis (H) 02/27/2012         Tobacco use     No Comments Provided        Past Surgical History:   Past Surgical History:   Procedure Laterality Date    ARTHROSCOPY KNEE  05/2017    Dr Torres    ARTHROSCOPY KNEE  07/20/2017    Dr Garza, lateral release, meniscal repair    ARTHROTOMY WRIST Left 2011    Dr. Torres    BONE MARROW BIOPSY, BONE SPECIMEN, NEEDLE/TROCAR Left 02/13/2025    Procedure: Bone marrow biopsy, bone specimen, needle/trocar;  Surgeon: Hema Mari MD;  Location:  OR    CHOLECYSTECTOMY  06/2007    Emergency tracheotomy following failed intubation for laparoscopic cholecystectomy.    DILATION AND CURETTAGE  09/2006         HERNIA REPAIR  2007    Incisoinal hernia repair    HYSTERECTOMY TOTAL ABDOMINAL  02/2010         IMPLANT STIMULATOR AND LEADS SACRAL NERVE (STAGE ONE AND TWO) N/A 12/11/2018    Procedure: Interstim Battery Replacement;  Surgeon: Rl Ambriz MD;  Location:  OR    INTERSTIM DEVICE STAGE 2  01/06/2014    Dr. Ambriz [facility]    LAPAROSCOPIC ABLATION ENDOMETRIOSIS  09/2006         OOPHORECTOMY Left 2010     Dr Thorpe    PICC INSERTION - DOUBLE LUMEN Right 03/05/2025    5FR, DL, total cath length=42 CM, visible cath length= 3CM, brachial medial vein    RECONSTRUCT FOREFOOT WITH METATARSOPHALANGEAL (MTP) FUSION Right 05/05/2022    Procedure: Right fibular sesmoid excision and 1st metatarsal phalangeal joint fusion;  Surgeon: Rl Nathan DPM;  Location:  OR    RELEASE CARPAL TUNNEL  02/02/2017         RELEASE PLANTAR FASCIA Left 12/19/2024    Procedure: excision of soft tissue mass left foot,  gastroc recession;  Surgeon: Rl Nathan DPM;  Location:  OR    REMOVE HARDWARE FOOT Right 08/10/2023    Procedure: REMOVAL, HARDWARE, FOOT;  Surgeon: Rl Nathan DPM;  Location:  OR    SALPINGO OOPHORECTOMY,R/L/KELSEY Right 06/1999    Right salpingo-oophorectomy for hemorrhagic corpus luteum cyst    TRACHEOSTOMY  2007    emergency following failed intubation during  cholecystectomy    TYMPANOSTOMY, LOCAL/TOPICAL ANESTHESIA  08/2006    PE tube placement        Social History:   Social History     Tobacco Use    Smoking status: Every Day     Current packs/day: 1.00     Average packs/day: 1 pack/day for 44.3 years (44.3 ttl pk-yrs)     Types: Cigarettes     Start date: 1981     Passive exposure: Past    Smokeless tobacco: Never    Tobacco comments:     Passive exposure in childhood and adult homes and some work places   Vaping Use    Vaping status: Never Used   Substance Use Topics    Alcohol use: No     Alcohol/week: 0.0 standard drinks of alcohol    Drug use: No       Medications:    Current Outpatient Rx   Medication Sig Dispense Refill    acetaminophen (TYLENOL) 500 MG tablet Take 1,000 mg by mouth 3 times daily.      acyclovir (ZOVIRAX) 400 MG tablet Take 1 tablet (400 mg) by mouth 2 times daily. 60 tablet 0    albuterol (PROAIR HFA/PROVENTIL HFA/VENTOLIN HFA) 108 (90 Base) MCG/ACT inhaler Inhale 1-2 puffs into the lungs every 4 hours as needed for shortness of breath, wheezing or cough. 18 g 4    budesonide-formoterol (SYMBICORT) 80-4.5 MCG/ACT Inhaler Inhale 2 puffs into the lungs 2 times daily - Rinse mouth well after use to prevent Thrush 10.2 g 11    calcium carbonate (TUMS) 500 MG chewable tablet Take 1 tablet (500 mg) by mouth 3 times daily as needed for heartburn. 90 tablet 0    cyanocobalamin (VITAMIN B-12) 500 MCG tablet Take 500 mcg by mouth daily.      cyclobenzaprine (FLEXERIL) 10 MG tablet Take 1 tablet (10 mg) by mouth 3 times daily as needed for muscle spasms. 15 tablet 0    famotidine (PEPCID) 20 MG tablet Take 1 tablet (20 mg) by mouth 2 times daily. 60 tablet 3    fluticasone (FLONASE) 50 MCG/ACT nasal spray Spray 2 sprays into both nostrils 2 times daily. 15.8 mL 0    gabapentin (NEURONTIN) 400 MG capsule Take 1 capsule (400 mg) by mouth 3 times daily. 90 capsule 0    hydrOXYzine HCl (ATARAX) 25 MG tablet Take 1-2 tablets (25-50 mg) by mouth every 6 hours  as needed for anxiety or itching (adjuvant pain, sleep). 90 tablet 3    ipratropium - albuterol 0.5 mg/2.5 mg/3 mL (DUONEB) 0.5-2.5 (3) MG/3ML neb solution Take 1 vial (3 mLs) by nebulization every 4 hours as needed for shortness of breath, wheezing or cough 90 mL 11    levofloxacin (LEVAQUIN) 250 MG tablet Take 1 tablet (250 mg) by mouth daily. Start on hospital discharge; continue through soo (low point in blood counts) until ANC >1.0, or as otherwise instructed by your oncology team. 30 tablet 3    loratadine (CLARITIN) 10 MG tablet Take 1 tablet (10 mg) by mouth daily. 30 tablet 0    ondansetron (ZOFRAN ODT) 4 MG ODT tab Take 1-2 tablets (4-8 mg) by mouth every 8 hours as needed for nausea or vomiting. 45 tablet 3    order for DME Equipment being ordered: home neb set up with mask and tubing 1 Device 0    pantoprazole (PROTONIX) 40 MG EC tablet Take 1 tablet (40 mg) by mouth 2 times daily (before meals). 60 tablet 3    PARoxetine (PAXIL) 20 MG tablet TAKE 1 TABLET (20 MG) BY MOUTH EVERY MORNING 90 tablet 0    posaconazole (NOXAFIL) 100 MG DR tablet Take 3 tablets (300 mg) by mouth every morning. 90 tablet 3    prednisoLONE acetate (PRED FORTE) 1 % ophthalmic suspension Place 2 drops into both eyes 4 times daily. Please continue through Monday, 4/7. 5 mL 0    prochlorperazine (COMPAZINE) 5 MG tablet Take 1 tablet (5 mg) by mouth every 6 hours as needed for nausea or vomiting. 30 tablet 3    rosuvastatin (CRESTOR) 20 MG tablet Take 1 tablet (20 mg) by mouth daily. 30 tablet 3    sucralfate (CARAFATE) 1 GM/10ML suspension Take 10 mLs (1 g) by mouth 4 times daily (before meals and nightly). 414 mL 0    SUMAtriptan (IMITREX) 50 MG tablet Take 1 tablet (50 mg) by mouth at onset of headache for migraine May repeat in 2 hours. Max 4 tablets/24 hours. 9 tablet 1    traMADol (ULTRAM) 50 MG tablet Take 1 tablet (50 mg) by mouth every 6 hours as needed for severe pain. 30 tablet 0    Vitamin D, Cholecalciferol, 25 MCG  (1000 UT) CAPS Take 1,000 Units by mouth daily. 90 capsule 1       Allergies:  Adhesive tape, Bee pollen, Bee venom, Folic acid, Pollen extract, Amoxicillin, Chlorhexidine, Liquid adhesive, Meloxicam, and Nabumetone      Medical records were reviewed and are summarized above.      Review of Systems     Complete review of systems negative except as noted in HPI    Immunization status was reviewed   Immunization History   Administered Date(s) Administered    COVID-19 Monovalent 18+ (Moderna) 04/29/2021, 06/01/2021    Influenza Vaccine >6 months,quad, PF 12/02/2014, 01/02/2017    Mantoux Tuberculin Skin Test 10/19/2015    Pneumococcal 23 valent 09/26/2007    TD,PF 7+ (Tenivac) 05/23/2005    TDAP Vaccine (Boostrix) 02/20/2014         Physical Exam     Vitals:    04/27/25 2245 04/27/25 2300 04/27/25 2313 04/27/25 2315   BP:  122/82 110/82 113/73   Pulse: 84 84 81 80   Resp: 22 21 24 28   Temp:   98.1  F (36.7  C)    TempSrc:       SpO2: 94% 94% 95% 93%   Weight:       Height:             GENERAL APPEARANCE: 59 year old female who appears ill.  She is flushed and diaphoretic.  FACE: normal facies  EYES: Pupils are equal  HENT: Tympanic membranes are normal bilaterally. EACs are patent bilaterally. Oral mucosa is pink and moist. Dentition is in good repair. Posterior pharynx is pink and moist. Tonsils are grade 0 and there is no exudate. Nares are patent.   NECK: No palpable lymphadenopathy, and there is no apparent tenderness. No asymmetry noted  RESP: Mildly tachypneic, expiratory wheezes throughout, no crackles.  O2 sats are 95% on room air  CV: Mild tachycardia, systolic murmur noted  ABD: soft, with no tenderness.  No rebound or guarding.  Bowel sounds are normal  MS: no gross deformities noted; normal muscle tone.  SKIN: no worrisome rash  NEURO: no facial droop; no focal deficits, speech is normal  PSYCH: Normal mood and affect is congruent.  Behavior is normal. Thought content normal. Speech is not slurred. Thought  content is not paranoid and not delusional.       Available Lab/Imaging Results from the past 48 hours     Results for orders placed or performed during the hospital encounter of 04/27/25 (from the past 24 hours)   Lactic Acid Whole Blood w/ 1x repeat in 2 hrs when >2   Result Value Ref Range    Lactic Acid, Initial 1.6 0.7 - 2.0 mmol/L   [place] Draw    Narrative    The following orders were created for panel order [place] Draw.  Procedure                               Abnormality         Status                     ---------                               -----------         ------                     Extra Blue Top Tube[[id number]]                             Final result               Extra Red Top Tube[[id number]]                              Final result               Extra Green Top (Lithiu...[[id number]]                                                 Extra Purple Top Tube[[id number]]                                                        Please view results for these tests on the individual orders.   CBC with platelets differential    Narrative    The following orders were created for panel order CBC with platelets differential.  Procedure                               Abnormality         Status                     ---------                               -----------         ------                     CBC with platelets and ...[[id number]]  Abnormal            Final result               RBC and Platelet Morpho...[[id number]]                      Final result               Manual Differential[[id number]]                                                          Please view results for these tests on the individual orders.   Comprehensive metabolic panel   Result Value Ref Range    Sodium 138 135 - 145 mmol/L    Potassium 3.2 (L) 3.4 - 5.3 mmol/L    Carbon Dioxide (CO2) 24 22 - 29 mmol/L    Anion Gap 16 (H) 7 - 15 mmol/L    Urea Nitrogen 10.8 6.0 - 20.0 mg/dL    Creatinine 0.90 0.51 - 0.95 mg/dL    GFR Estimate  74 >60 mL/min/1.73m2    Calcium 8.4 (L) 8.8 - 10.4 mg/dL    Chloride 98 98 - 107 mmol/L    Glucose 130 (H) 70 - 99 mg/dL    Alkaline Phosphatase 167 (H) 40 - 150 U/L    AST 40 0 - 45 U/L    ALT 28 0 - 50 U/L    Protein Total 6.8 6.4 - 8.3 g/dL    Albumin 3.4 (L) 3.5 - 5.2 g/dL    Bilirubin Total 0.4 <=1.2 mg/dL   Extra Blue Top Tube   Result Value Ref Range    Hold Specimen JIC    Extra Red Top Tube   Result Value Ref Range    Hold Specimen JIC    Magnesium   Result Value Ref Range    Magnesium 2.2 1.7 - 2.3 mg/dL   Blood gas venous   Result Value Ref Range    pH Venous 7.49 (H) 7.32 - 7.43    pCO2 Venous 36 (L) 40 - 50 mm Hg    pO2 Venous 29 25 - 47 mm Hg    Bicarbonate Venous 28 21 - 28 mmol/L    Base Excess/Deficit Venous 4.1 (H) -3.0 - 3.0 mmol/L    FIO2 21     Oxyhemoglobin Venous 60 (L) 70 - 75 %    O2 Sat, Venous 63.2 (L) 70.0 - 75.0 %    Narrative    In healthy individuals, oxyhemoglobin (O2Hb) and oxygen saturation (SO2) are approximately equal. In the presence of dyshemoglobins, oxyhemoglobin can be considerably lower than oxygen saturation.   Influenza A/B, RSV and SARS-CoV2 PCR (COVID-19) Nose    Specimen: Nose; Swab   Result Value Ref Range    Influenza A PCR Negative Negative    Influenza B PCR Negative Negative    RSV PCR Negative Negative    SARS CoV2 PCR Negative Negative    Narrative    Testing was performed using the Xpert Xpress CoV2/Flu/RSV Assay on the [employer] GeneXpert Instrument. This test should be ordered for the detection of SARS-CoV2, influenza, and RSV viruses in individuals with signs and symptoms of respiratory tract infection. This test is for in vitro diagnostic use under the [place] FDA for laboratories certified under CLIA to perform high or moderate complexity testing. This test has been [place] FDA cleared. A negative result does not rule out the presence of PCR inhibitors in the specimen or target RNA in concentration below the limit of detection for the assay. If only one viral target is  positive but coinfection with multiple targets is suspected, the sample should be re-tested with another FDA cleared, approved, or authorized test, if coninfection would change clinical management. This test was validated by the [facility]. These laboratories are certified under the Clinical Laboratory Improvement Amendments of 1988 (CLIA-88) as qualified to perfom high complexity laboratory testing.   XR Chest 2 Views    Narrative    EXAM: XR CHEST 2 VIEWS  LOCATION: [facility]  DATE: 4/27/2025    INDICATION: Fever, cough, immunocompromised.  COMPARISON: CT 4/14/2025.      Impression    IMPRESSION: Small area of new infiltrate versus atelectasis left lung base. No consolidation. Right lung is clear. Normal heart size. Mild pulmonary venous hypertension. No pleural fluid. Aortic calcification. Osseous structures unremarkable.    CBC with platelets and differential   Result Value Ref Range    WBC Count 5.8 4.0 - 11.0 10e3/uL    RBC Count 2.20 (L) 3.80 - 5.20 10e6/uL    Hemoglobin 6.6 (LL) 11.7 - 15.7 g/dL    Hematocrit 20.4 (L) 35.0 - 47.0 %    MCV 93 78 - 100 fL    MCH 30.0 26.5 - 33.0 pg    MCHC 32.4 31.5 - 36.5 g/dL    RDW 17.8 (H) 10.0 - 15.0 %    Platelet Count 365 150 - 450 10e3/uL    % Neutrophils 61 %    % Lymphocytes 10 %    % Monocytes 27 %    % Eosinophils 1 %    % Basophils 0 %    % Immature Granulocytes 1 %    NRBCs per 100 WBC 2 (H) <1 /100    Absolute Neutrophils 3.5 1.6 - 8.3 10e3/uL    Absolute Lymphocytes 0.6 (L) 0.8 - 5.3 10e3/uL    Absolute Monocytes 1.6 (H) 0.0 - 1.3 10e3/uL    Absolute Eosinophils 0.1 0.0 - 0.7 10e3/uL    Absolute Basophils 0.0 0.0 - 0.2 10e3/uL    Absolute Immature Granulocytes 0.1 <=0.4 10e3/uL    Absolute NRBCs 0.1 10e3/uL   RBC and Platelet Morphology   Result Value Ref Range    RBC Morphology Confirmed RBC Indices     Platelet Assessment  Automated Count Confirmed. Platelet morphology is normal.     Automated Count Confirmed.  Platelet morphology is normal.   ABO/Rh type and screen    Narrative    The following orders were created for panel order ABO/Rh type and screen.  Procedure                               Abnormality         Status                     ---------                               -----------         ------                     Adult Type and Screen[[id number]]                           Final result                 Please view results for these tests on the individual orders.   Adult Type and Screen   Result Value Ref Range    ABO/RH(D) A POS     Antibody Screen Negative Negative    SPECIMEN EXPIRATION DATE [id number]    UA with Microscopic reflex to Culture    Specimen: Urine, Clean Catch   Result Value Ref Range    Color Urine Light Yellow Colorless, Straw, Light Yellow, Yellow    Appearance Urine Clear Clear    Glucose Urine Negative Negative mg/dL    Bilirubin Urine Negative Negative    Ketones Urine Negative Negative mg/dL    Specific Gravity Urine 1.008 1.000 - 1.030    Blood Urine Small (A) Negative    pH Urine 6.5 5.0 - 9.0    Protein Albumin Urine Negative Negative mg/dL    Urobilinogen Urine Normal Normal mg/dL    Nitrite Urine Negative Negative    Leukocyte Esterase Urine Negative Negative    Bacteria Urine Few (A) None Seen /HPF    RBC Urine 4 (H) <=2 /HPF    WBC Urine 1 <=5 /HPF    Narrative    Urine Culture not indicated   Prepare red blood cells (unit)   Result Value Ref Range    Blood Component Type Red Blood Cells     Product Code B5423A32     Unit Status Issued     Unit Number [id number]     CROSSMATCH Compatible     CODING SYSTEM [id number]     ISSUE DATE AND TIME [id number]     UNIT ABO/RH A+     UNIT TYPE ISBT 6200    Prepare red blood cells (unit)   Result Value Ref Range    Blood Component Type Red Blood Cells     Product Code F2174L03     Unit Status Ready for issue     Unit Number [id number]     CROSSMATCH Compatible     CODING SYSTEM [id number]    Prepare red blood cells (unit)   Result  Value Ref Range    Blood Component Type Red Blood Cells     Product Code N5637U06     Unit Status Ready for issue     Unit Number [id number]     CROSSMATCH Compatible     CODING SYSTEM [id number]          Medicine used during this ED stay:     Medications   sodium chloride (PF) 0.9% PF flush 3 mL (has no administration in time range)   sodium chloride (PF) 0.9% PF flush 3 mL (3 mLs Intracatheter Not Given 4/27/25 2209)   azithromycin (ZITHROMAX) 500 mg in  mL intermittent infusion (500 mg Intravenous $New Bag 4/27/25 2231)   acetaminophen (TYLENOL) tablet 650 mg (650 mg Oral $Given 4/27/25 2207)     Or   acetaminophen (TYLENOL) Suppository 650 mg ( Rectal See Alternative 4/27/25 2207)   ipratropium - albuterol 0.5 mg/2.5 mg/3 mL (DUONEB) neb solution 3 mL (3 mLs Nebulization $Given 4/27/25 2111)   cefTRIAXone (ROCEPHIN) 2 g vial to attach to  ml bag for ADULTS or NS 50 ml bag for PEDS (0 g Intravenous Stopped 4/27/25 2232)   potassium chloride sebastian ER (KLOR-CON M20) CR tablet 40 mEq (40 mEq Oral $Given 4/27/25 2207)   ketorolac (TORADOL) injection 15 mg (15 mg Intravenous $Given 4/27/25 2229)       Procedures:  Procedures     Assessment and plan     Final diagnoses:   Community acquired pneumonia of left lower lobe of lung   Anemia of chronic disease   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)           ED Course/medical decision making    Patient first seen at 8:40 PM      ED Course as of 04/27/25 2328   Sun Apr 27, 2025 2129 Patient has a history of AML and is currently on chemotherapy and presents to the ER with fevers, cough, shortness of breath.  Symptoms started this afternoon.  She was told she should go to the ER if she ever developed fevers.  She denies other symptoms.  On exam she appears flushed and diaphoretic.  Her temperature is 100.6  F she is mildly tachycardic at 104 mildly tachypneic at 32 breaths/min.  Her O2 sats are 95% on room air.  Blood pressure is normal.  Lungs  are fairly clear with expiratory wheezes throughout.  No crackles.  She does have a systolic murmur that is not new to her.  Abdomen is soft and nontender.   2132 Given her underlying immunocompromise state, we will perform sepsis workup.       2151 Labs still pending, however chest x-ray does show possible left lower lobe pneumonia.  Blood cultures have been drawn.  Will order ceftriaxone/Zithromax IV for community-acquired sepsis protocol and Tylenol for fever.   2212 Labs reviewed and hemoglobin is 6.6.  [place]  Will obtain a type and screen and transfuse 2 units of blood.  Her white blood cell count and platelets are within normal limits. Potassium is 3.4 and will be replaced per protocol.  Awaiting on magnesium.  Lactate is 1.6.  She just had one of her coughing attacks and now has a pretty significant headache.  Will treat with Toradol 15 mg IV.   2327 Plan to admit to the hospital for further management.  Discussed with Dr. Ferguson who will be her admitting provider.  Updated patient who agrees with this plan.  Pain is improved with receiving Toradol.               Katherine Gordon PA-C

## 2025-04-28 NOTE — PROGRESS NOTES
[facility]: Transitions of Care Note  Chief Complaint   Patient presents with    Clinic Care Coordination - Post Hospital       Most Recent Admission Date: 4/27/2025   Most Recent Admission Diagnosis: Anemia of chronic disease - D63.8  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) - C92.00  Community acquired pneumonia of left lower lobe of lung - J18.9     Most Recent Discharge Date: 4/28/2025   Most Recent Discharge Diagnosis: Community acquired pneumonia of left lower lobe of lung - J18.9  Anemia of chronic disease - D63.8  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) - C92.00     Transitions of Care Assessment    Discharge Assessment  How are your symptoms? (Red Flag symptoms escalate to triage hotline per guidelines): Improved  Do you know how to contact your clinic care team if you have future questions or changes to your health status? : Yes  Does the patient have their discharge instructions? : Yes  Does the patient have questions regarding their discharge instructions? : No  Were you started on any new medications or were there changes to any of your previous medications? : Yes  Does the patient have all of their medications?: Yes  Do you have questions regarding any of your medications? : No  Do you have all of your needed medical supplies or equipment (DME)?  (i.e. oxygen tank, CPAP, cane, etc.): Yes             Follow up Plan     Discharge Follow-Up  Discharge follow up appointment scheduled in alignment with recommended follow up timeframe or Transitions of Risk Category? (Low = within 30 days; Moderate= within 14 days; High= within 7 days): Yes  Discharge Follow Up Appointment Date: 05/05/25  Discharge Follow Up Appointment Scheduled with?: Primary Care Provider    Future Appointments   Date Time Provider Department Center   5/1/2025  1:00 PM  LAB [facility] [place]   5/5/2025 10:00 AM Jeanna Britt APRN CNP [facility] [place]   5/5/2025  1:10 PM  LAB [facility] [place]   5/8/2025  1:20 PM GH LAB [facility] [place]   5/12/2025  1:10 PM GH LAB [facility] [place]   5/15/2025  1:00 PM GH LAB [facility] [place]   5/19/2025  1:00 PM GH LAB [facility] [place]   5/22/2025 12:50 PM GH LAB [facility] [place]   5/29/2025  1:00 PM [facility] [place]   6/2/2025  1:00 PM [facility] LAB [facility] [place]   6/5/2025 12:50 PM [facility] LAB [facility] [place]   6/20/2025  1:30 PM Samina Harris MD ON [place]       Outpatient Plan as outlined on AVS reviewed with patient.    For any urgent concerns, please contact our 24 hour clinic line:        Message sent to [facility] regarding [facility]'s hospital stay.     Lisha Winkler RN

## 2025-04-28 NOTE — NURSING NOTE
"Oncology Rooming Note    April 28, 2025 12:59 PM   Alejandra Villegas is a 59 year old female who presents for:    Chief Complaint   Patient presents with    Oncology Clinic Visit     Acute myeloid leukemia, being admitted to [facility] on 5/1     Initial Vitals: BP (!) 148/82 (BP Location: Right arm, Patient Position: Sitting, Cuff Size: Adult Large)   Pulse 94   Temp 100  F (37.8  C) (Tympanic)   Resp 28   Wt 82.1 kg (181 lb)   LMP 01/01/2007 (Approximate)   SpO2 94%   BMI 29.21 kg/m   Estimated body mass index is 29.21 kg/m  as calculated from the following:    Height as of 4/27/25: 1.676 m (5' 6\").    Weight as of this encounter: 82.1 kg (181 lb). Body surface area is 1.96 meters squared.  Moderate Pain (6) Comment: Data Unavailable   Patient's last menstrual period was 01/01/2007 (approximate).  Allergies reviewed: Yes  Medications reviewed: Yes    Medications: Medication refills not needed today.  Pharmacy name entered into EPIC:    [id number] - [place], [place] - [address] [id number] - [place], [place] - [address]    Frailty Screening:   Is the patient here for a new oncology consult visit in cancer care? 2. No    PHQ9:  Did this patient require a PHQ9?: No      Clinical concerns: dry mouth, wobbly when walks, wants robitussin with codeine for her cough       Natasha Cabrera LPN            "

## 2025-04-28 NOTE — H&P
HOSPITALIST TELEMED ADMISSION H&P    Service Date : 4/28/2025  Dr. Chelsie PAYAN am located in [place], [place]  Alejandra Villegas is located in [place] at [facility]     RN, Britney, on Med/Surg unit is assisting me today with this patient.    chief complaint:   fever, cough and shortness of breath.    History of Present Illness:  Alejandra Villegas is a 59 year old female with    Emphysema/asthma, tobacco use disorder, acute myeloid leukemia, pancytopenia secondary to antineoplastic chemotherapy,  anemia of chronic disease, rheumatoid arthritis,hiatal hernia, anxiety, CKD stage II, hypertension, hyperlipidemia and GERD  presented to the emergency department with  fever, cough, and shortness of breath.  She states she was running errands with her son this afternoon and started feeling tired and shaky.  She went home to take a nap and when she woke up she felt warm.  She took her temperature and it was 102  F.  She was told to go in and get checked out if she were ever to have a fever since she is on chemotherapy.  Her last dose of chemo was and 1/2 weeks ago.  She used to for another round of chemo in about 4 days.  She states she has had a dry cough for the past week and a half.  At times she will cough so hard it is hard for her to catch her breath.  She also will have headaches due to her coughing.  She also feels like her tongue is sore and she is having hard time eating and drinking due to the pain.  She has not been exposed anybody that has been sick.    She denies ear pain, nasal congestion, sore throat, chest pain, abdominal pain, nausea, vomiting, diarrhea, dysuria, hematuria, urgency, frequency.  Denies rash.      Patient was sleep at the time of the virtual examination, the history was obtained solely from the ED provider note.        Emergency Room Course - in the emergency room,  type and crossmatch for 2 units of packed RBCs, Transfusion for 2 units of RBCs were  started in the emergency department.  2 sets of blood cultures were obtained, the patient was started on azithromycin and Rocephin for pneumonia.  Patient was also given potassium chloride 40 mEq, ketorolac 15 mg IV and acetaminophen in addition to DuoNeb treatment,   As well as 500 mL of normal saline.   2 views of a chest xray was completed and remarkable for Small area of new infiltrate versus atelectasis left lung base.  The right lung is clear.        VBG is remarkable for a pH venous 7.49 and a pCO2 736, base excess 4.1, oxygen saturation 63.2% this would suggest that there is a respiratory alkalosis.      Urinalysis is remarkable for small amount of blood and few bacteria urine cultures are pending     Review of the comprehensive metabolic panel is remarkable for potassium 3.2, anion gap slightly increased at 16, glucose increased at 130, alkaline phosphatase increased at 167.  Hypokalemia is managed with the standard potassium replacement protocol.      Review of the CBC is remarkable for a  WBC 5.8 hemoglobin of 6.6 and a hematocrit of 20.4,  patient had chemotherapy approximately 3 weeks ago, no obvious active bleeding was noted, the decrease in the hemoglobin and hematocrit is most likely chemotherapy induced.  The patient was transfused 2 units of RBCs that were started in the emergency room.        Past Medical History  Past Medical History:   Diagnosis Date    Allergic rhinitis 09/27/2011         Disorder of kidney and ureter 08/08/2008    Kidney disease GFR 87    Dorsalgia     No Comments Provided    Generalized anxiety disorder     No Comments Provided    Hidradenitis suppurativa     No Comments Provided    Other disorders of lung (CODE) 08/01/2007    Pulmonary nodules, stable on follow-up chest CT 12/08.  No further imaging recommended.    Other specified disorders of Eustachian tube, unspecified ear 02/27/2012         Personal history of other medical treatment (CODE)     Childbirth x4     Rheumatoid arthritis (H) 02/27/2012         Tobacco use     No Comments Provided      Patient Active Problem List   Diagnosis    Alopecia areata    Anxiety state    Benign paroxysmal positional vertigo    Other isolated or specific phobias    Degenerative disc disease at L5-S1 level    Gastroesophageal reflux disease    Hidradenitis suppurativa    Symptomatic menopausal or female climacteric states    Pain management contract broken    Other diseases of lung, not elsewhere classified    Seasonal allergic rhinitis    Tobacco use disorder    Urge incontinence    Ovarian cyst, left    Other acquired deformity of ankle and foot(736.79)    Asthma    RA (rheumatoid arthritis) (H)    Osteopenia of right foot    CKD (chronic kidney disease) stage 2, GFR 60-89 ml/min    Pain in joint, ankle and foot, left    Pulmonary emphysema, unspecified emphysema type (H)    Benign essential hypertension    Mixed hyperlipidemia    Macrocytic anemia    Anemia    Leukopenia    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)    Pancytopenia due to antineoplastic chemotherapy    Hiatal hernia    Anemia of chronic disease    Community acquired pneumonia of left lower lobe of lung         Past Surgical History  Past Surgical History:   Procedure Laterality Date    ARTHROSCOPY KNEE  05/2017    Dr Torres    ARTHROSCOPY KNEE  07/20/2017    Dr Garza, lateral release, meniscal repair    ARTHROTOMY WRIST Left 2011    Dr. Torres    BONE MARROW BIOPSY, BONE SPECIMEN, NEEDLE/TROCAR Left 02/13/2025    Procedure: Bone marrow biopsy, bone specimen, needle/trocar;  Surgeon: Hema Mari MD;  Location:  OR    CHOLECYSTECTOMY  06/2007    Emergency tracheotomy following failed intubation for laparoscopic cholecystectomy.    DILATION AND CURETTAGE  09/2006         HERNIA REPAIR  2007    Incisoinal hernia repair    HYSTERECTOMY TOTAL ABDOMINAL  02/2010         IMPLANT STIMULATOR AND LEADS SACRAL NERVE (STAGE ONE AND TWO) N/A 12/11/2018     Procedure: Interstim Battery Replacement;  Surgeon: Rl Ambriz MD;  Location: [facility] OR    INTERSTIM DEVICE STAGE 2  01/06/2014    Dr. Ambriz - [facility]    LAPAROSCOPIC ABLATION ENDOMETRIOSIS  09/2006         OOPHORECTOMY Left 2010     Dr Thorpe    PICC INSERTION - DOUBLE LUMEN Right 03/05/2025    5FR, DL, total cath length=42 CM, visible cath length= 3CM, brachial medial vein    RECONSTRUCT FOREFOOT WITH METATARSOPHALANGEAL (MTP) FUSION Right 05/05/2022    Procedure: Right fibular sesmoid excision and 1st metatarsal phalangeal joint fusion;  Surgeon: Rl Nathan DPM;  Location: [facility] OR    RELEASE CARPAL TUNNEL  02/02/2017         RELEASE PLANTAR FASCIA Left 12/19/2024    Procedure: excision of soft tissue mass left foot,  gastroc recession;  Surgeon: Rl Nathan DPM;  Location: [facility] OR    REMOVE HARDWARE FOOT Right 08/10/2023    Procedure: REMOVAL, HARDWARE, FOOT;  Surgeon: Rl Nathan DPM;  Location: [facility] OR    SALPINGO OOPHORECTOMY,R/L/KELSEY Right 06/1999    Right salpingo-oophorectomy for hemorrhagic corpus luteum cyst    TRACHEOSTOMY  2007    emergency following failed intubation during cholecystectomy    TYMPANOSTOMY, LOCAL/TOPICAL ANESTHESIA  08/2006    PE tube placement      Social History  Alejandra  reports that she has been smoking cigarettes. She started smoking about 44 years ago. She has a 44.3 pack-year smoking history. She has been exposed to tobacco smoke. She has never used smokeless tobacco. She reports that she does not drink alcohol and does not use drugs.    Family History  Alejandra's family history includes Arthritis in her mother; Asthma in her brother and sister; Cancer in her mother; Heart Disease in her father; Other - See Comments in her father, sister, and son; Thyroid Disease in her father.    Home Medications  Current Outpatient Medications   Medication Instructions    acetaminophen (TYLENOL) 1,000 mg, 3 TIMES DAILY    acyclovir (ZOVIRAX) 400 mg, Oral, 2 TIMES DAILY    albuterol  (PROAIR HFA/PROVENTIL HFA/VENTOLIN HFA) 108 (90 Base) MCG/ACT inhaler 1-2 puffs, Inhalation, EVERY 4 HOURS PRN    budesonide-formoterol (SYMBICORT) 80-4.5 MCG/ACT Inhaler 2 puffs, Inhalation, 2 TIMES DAILY, - Rinse mouth well after use to prevent Thrush    calcium carbonate (TUMS) 500 mg, Oral, 3 TIMES DAILY PRN    cyanocobalamin (VITAMIN B-12) 500 mcg, DAILY    cyclobenzaprine (FLEXERIL) 10 mg, Oral, 3 TIMES DAILY PRN    famotidine (PEPCID) 20 mg, Oral, 2 TIMES DAILY    fluticasone (FLONASE) 50 MCG/ACT nasal spray 2 sprays, Both Nostrils, 2 TIMES DAILY    gabapentin (NEURONTIN) 400 mg, Oral, 3 TIMES DAILY    hydrOXYzine HCl (ATARAX) 25-50 mg, Oral, EVERY 6 HOURS PRN    ipratropium - albuterol 0.5 mg/2.5 mg/3 mL (DUONEB) 0.5-2.5 (3) MG/3ML neb solution 3 mLs, Nebulization, EVERY 4 HOURS PRN    levofloxacin (LEVAQUIN) 250 mg, Oral, DAILY, Start on hospital discharge; continue through soo (low point in blood counts) until ANC >1.0, or as otherwise instructed by your oncology team.    loratadine (CLARITIN) 10 mg, Oral, DAILY    ondansetron (ZOFRAN ODT) 4-8 mg, Oral, EVERY 8 HOURS PRN    order for DME Equipment being ordered: home neb set up with mask and tubing    pantoprazole (PROTONIX) 40 mg, Oral, 2 TIMES DAILY BEFORE MEALS    PARoxetine (PAXIL) 20 mg, Oral, EVERY MORNING    posaconazole (NOXAFIL) 300 mg, Oral, EVERY MORNING    prednisoLONE acetate (PRED FORTE) 1 % ophthalmic suspension 2 drops, Both Eyes, 4 TIMES DAILY, Please continue through Monday, 4/7.    prochlorperazine (COMPAZINE) 5 mg, Oral, EVERY 6 HOURS PRN    rosuvastatin (CRESTOR) 20 mg, Oral, DAILY    sucralfate (CARAFATE) 1 g, Oral, 4 TIMES DAILY BEFORE MEALS & NIGHTLY    SUMAtriptan (IMITREX) 50 mg, Oral, AT ONSET OF HEADACHE, May repeat in 2 hours. Max 4 tablets/24 hours.    traMADol (ULTRAM) 50 mg, Oral, EVERY 6 HOURS PRN    Vitamin D (Cholecalciferol) 1,000 Units, Oral, DAILY       Allergies  Allergies   Allergen Reactions    Adhesive Tape      "Bee Pollen Swelling     Seasonal    Bee Venom Unknown    Folic Acid Itching    Pollen Extract      Seasonal    Amoxicillin Rash    Chlorhexidine Rash     After several weeks, started to develop rash on R neck down to chest and arm. Also noted on back of knees. Providers suggesting related to CHG as nothing else is new for pt.     Liquid Adhesive Rash    Meloxicam Rash    Nabumetone GI Disturbance     GI upset        10 pt ROS neg except as noted in HPI    Physical Exam:  I performed all aspects of the physical examination via Telemedicine associated with two way audio and video communication.    Vital Signs: Blood pressure 113/73, pulse 80, temperature (!) 96.4  F (35.8  C), temperature source Tympanic, resp. rate 28, height 1.676 m (5' 6\"), weight 81.2 kg (179 lb),  SpO2 93%,       Physical Exam    Gen: ill appearing, , in no acute distress, lying semi-supine in her hospital bed, sleep  HEENT:  scalp is bald,   Resp:  No accessory muscle use, breath sounds are equal with expiratory wheezes, no rales no rhonchi  Card:  No murmur, normal S1, S2   Abd:  normoactive bowel sounds are present,  Skin: No rashes or skin breakdown.   Ext: No clubbing, cyanosis or edema was noted  Psych:  Not anxious, not agitated      DATA:    I&O: No intake/output data recorded.     Labs:  I have personally reviewed the following studies:  Recent Labs   Lab 04/27/25  2048   POTASSIUM 3.2*   CHLORIDE 98   CO2 24   BUN 10.8   ALBUMIN 3.4*   ALKPHOS 167*   AST 40   ALT 28      Recent Labs   Lab 04/27/25  2140   WBC 5.8   HGB 6.6*   HCT 20.4*             Imaging: ER imaging reviewed      Misc  DVT prophylaxis:   Code Status: DNR/DNI  Cardiac Monitoring:   Beckett: none  Lines:  peripheral IV  Diet:  regular      ASSESSMENT/PLAN:   59-year-old female with fever, pancytopenia, cough and shortness of breath will be admitted for medical management and stabilization.      This patient's current admission to an acute care setting is essential " for the treatment of   Severe anemia, and community-acquired pneumonia.     The patient's recovery is dependent on the following treatments of   - transfused 2 units of packed RBCs  - IV Rocephin  - azithromycin 250mg x 4 doses after 500mg IV x 1 given  - supplemental oxygen support as needed  -Correcting any electrolyte abnormalities  to stabilize this condition.       The patient is at risk of developing further complications of end organ failures if not treated in an acute care setting.     I expect the patient's hospital stay to require 2 - 4 days      Our patient will be admitted under the hospitalist services and further management to be based on patient's clinical progress.       #ACTIVE PROBLEM LIST:    # Hyperlipidemia:   Cholesterol   Date Value Ref Range Status   01/14/2025 163 <200 mg/dL Final   03/24/2020 167 <200 mg/dL Final    Stable                                 - Continue medication management with rosuvastatin 20mg q day      #Emphysema/Asthma  - Symbicort 80-4.5 bid  - Duoneb Tx q 4h, prn  - supplemental oxygent support as needed    #Prophylaxis for AML treatment  - Acyclovir 400mg   - Noxafil DR 100mg  3 tabs q am  - Gabapentin 400mg tid  -Tramadol 50mg   - IV dilaudid 0.5mg q 3hr, prn pain    #GERD  - Protonix 40mg bid before meals  - Sulcralfate 10mls qid  - Famotidine 20mg bid    #Anxiety  - Paxil 20mg q day  -  hydroxyzine 25 to 50 mg Every 6 hours, as needed     #Tobacco use disorder  - continues to smoke, but down to a pack every other day from 1.5PPD previously        Clinically Significant Risk Factors Present on Admission        # Hypokalemia: Lowest K = 3.2 mmol/L in last 2 days, will replace as needed    # Hypocalcemia: Lowest Ca = 8.4 mg/dL in last 2 days, will monitor and replace as appropriate     # Hypoalbuminemia: Lowest albumin = 3.4 g/dL at 4/27/2025  8:48 PM, will monitor as appropriate             # Overweight: Estimated body mass index is 29.09 kg/m  as calculated from the  "following:    Height as of this encounter: 1.676 m (5' 6\").    Weight as of this encounter: 81.7 kg (180 lb 3.2 oz).         # Financial/Environmental Concerns:                     As the provider for the telehealth service, I attest that I introduced myself to the patient and provided my credentials. Based on review of the patient s chart and/or a discussion with members of the patient s treatment team, I determined that telemedicine via a real-time, two-way, interactive audio and video platform is an appropriate means of providing this service.     The encounter was approximately 10 minutes. The nurse was present for the duration of the encounter.    Chart reviewed,labs and available imaging reviewed,consultant notes reviewed. Previous available medical records have been reviewed  Care plan discussed with care team    I have seen and examined the patient today. Documentation for this visit was completed using a template. Everything documented was personally performed today with the necessary additions, deletions and changes made as appropriate with the diagnoses being listed in no particular order of clinical relevance.    [facility]  Securely message with the Vocera Web Console (learn more here)  Text page via [email or web address] Paging/Directory   "

## 2025-04-28 NOTE — PROGRESS NOTES
Patient remains on room air.  Breath sounds clear this morning.  Scheduled nebs given.  1 PRN neb in the ER.  Patient feels her breathing has improved.

## 2025-04-28 NOTE — PROGRESS NOTES
HEMATOLOGY FOLLOW-UP NOTE  Apr 28, 2025    Reason for follow-up: Acute myeloid leukemia NPM1 mutation    HISTORY OF PRESENT ILLNESS  Alejandra Villegas is a 59 year old female with Pmh as stated below who is seen in the hematology clinic for anemia and neutropenia    Her history in short is as follows:    Ms. Villegas was found to have a WBC count of 2.4 with ANC of 0.3 with a hemoglobin of 12 with MCV of 112. Prior to that was seen to have a hemoglobin of 14.8 with MCV of 95 and WBC of 8.4.     2/13/2025: Bone marrow biopsy with aspiration: Blast count was 4%.  Bone marrow cellularity 85%: Myeloid activated NGS showed NPMI mutation with VAF 37%.NGS also showed IDH 2 and NRAS mutation    As based on the WHO  2022 classification, this would be considered AML she was transferred to the [facility].    Following multidisciplinary discussion that she was subsequently started on treatment    3/5/2025: Received induction with 7+ 3.  Her course was complicated by neutropenic fevers-4 episodes.  She also developed severe epigastric pain with reflux symptoms.  GI was consulted.  EGD at that time was deferred due to her pancytopenia.  Imaging showed esophagitis and hiatal hernia. Symptoms improved with optimization of GERD therapy    3/19/2025: Bone marrow biopsy with aspiration: No morphological or no phenotypic evidence of acute myeloid leukemia.  Hypercellular marrow for age, cellularity estimated at less than 5%    3/31/2025: Bone marrow biopsy: Hypercellular for age-cellularity estimated 60 to 70% with trilineage marrow hematopoietic maturation, no overt dysplasia and increase in blasts.    4/3/2025: HiDAC.  Neulasta received 4/7/2025.    Interim history    She was admitted to the hospital [facility] on 4/27/2025 for fever of 102.  She feels like she started having a cough few days ago.  She also was having more shortness of breath.  No sick contacts.    REVIEW OF SYSTEMS  A 12-point ROS negative except as in HPI      Current  Outpatient Medications   Medication Sig Dispense Refill    acetaminophen (TYLENOL) 500 MG tablet Take 1,000 mg by mouth 3 times daily.      acyclovir (ZOVIRAX) 400 MG tablet Take 1 tablet (400 mg) by mouth 2 times daily. 60 tablet 0    albuterol (PROAIR HFA/PROVENTIL HFA/VENTOLIN HFA) 108 (90 Base) MCG/ACT inhaler Inhale 1-2 puffs into the lungs every 4 hours as needed for shortness of breath, wheezing or cough. 18 g 4    azithromycin (ZITHROMAX) 250 MG tablet Take 1 tablet (250 mg) by mouth daily for 4 days. 4 tablet 0    budesonide-formoterol (SYMBICORT) 80-4.5 MCG/ACT Inhaler Inhale 2 puffs into the lungs 2 times daily - Rinse mouth well after use to prevent Thrush 10.2 g 11    calcium carbonate (TUMS) 500 MG chewable tablet Take 1 tablet (500 mg) by mouth 3 times daily as needed for heartburn. 90 tablet 0    cefdinir (OMNICEF) 300 MG capsule Take 1 capsule (300 mg) by mouth 2 times daily for 7 days. 14 capsule 0    cyanocobalamin (VITAMIN B-12) 500 MCG tablet Take 500 mcg by mouth daily.      cyclobenzaprine (FLEXERIL) 10 MG tablet Take 1 tablet (10 mg) by mouth 3 times daily as needed for muscle spasms. 15 tablet 0    famotidine (PEPCID) 20 MG tablet Take 1 tablet (20 mg) by mouth 2 times daily. 60 tablet 3    fluticasone (FLONASE) 50 MCG/ACT nasal spray Spray 2 sprays into both nostrils 2 times daily. 15.8 mL 0    gabapentin (NEURONTIN) 400 MG capsule Take 1 capsule (400 mg) by mouth 3 times daily. 90 capsule 0    hydrOXYzine HCl (ATARAX) 25 MG tablet Take 1-2 tablets (25-50 mg) by mouth every 6 hours as needed for anxiety or itching (adjuvant pain, sleep). 90 tablet 3    ipratropium - albuterol 0.5 mg/2.5 mg/3 mL (DUONEB) 0.5-2.5 (3) MG/3ML neb solution Take 1 vial (3 mLs) by nebulization every 4 hours as needed for shortness of breath, wheezing or cough 90 mL 11    levofloxacin (LEVAQUIN) 250 MG tablet Take 1 tablet (250 mg) by mouth daily. Start on hospital discharge; continue through soo (low point in  blood counts) until ANC >1.0, or as otherwise instructed by your oncology team. 30 tablet 3    loratadine (CLARITIN) 10 MG tablet Take 1 tablet (10 mg) by mouth daily. 30 tablet 0    ondansetron (ZOFRAN ODT) 4 MG ODT tab Take 1-2 tablets (4-8 mg) by mouth every 8 hours as needed for nausea or vomiting. 45 tablet 3    order for DME Equipment being ordered: home neb set up with mask and tubing 1 Device 0    pantoprazole (PROTONIX) 40 MG EC tablet Take 1 tablet (40 mg) by mouth 2 times daily (before meals). 60 tablet 3    PARoxetine (PAXIL) 20 MG tablet TAKE 1 TABLET (20 MG) BY MOUTH EVERY MORNING 90 tablet 0    posaconazole (NOXAFIL) 100 MG DR tablet Take 3 tablets (300 mg) by mouth every morning. 90 tablet 3    prednisoLONE acetate (PRED FORTE) 1 % ophthalmic suspension Place 2 drops into both eyes 4 times daily. Please continue through Monday, 4/7. 5 mL 0    prochlorperazine (COMPAZINE) 5 MG tablet Take 1 tablet (5 mg) by mouth every 6 hours as needed for nausea or vomiting. 30 tablet 3    rosuvastatin (CRESTOR) 20 MG tablet Take 1 tablet (20 mg) by mouth daily. 30 tablet 3    sucralfate (CARAFATE) 1 GM/10ML suspension Take 10 mLs (1 g) by mouth 4 times daily (before meals and nightly). 414 mL 0    SUMAtriptan (IMITREX) 50 MG tablet Take 1 tablet (50 mg) by mouth at onset of headache for migraine May repeat in 2 hours. Max 4 tablets/24 hours. 9 tablet 1    Vitamin D, Cholecalciferol, 25 MCG (1000 UT) CAPS Take 1,000 Units by mouth daily. 90 capsule 1       Allergies   Allergen Reactions    Adhesive Tape     Bee Pollen Swelling     Seasonal    Bee Venom Unknown    Folic Acid Itching    Pollen Extract      Seasonal    Amoxicillin Rash    Chlorhexidine Rash     After several weeks, started to develop rash on R neck down to chest and arm. Also noted on back of knees. Providers suggesting related to CHG as nothing else is new for pt.     Liquid Adhesive Rash    Meloxicam Rash    Nabumetone GI Disturbance     GI upset      Immunization History   Administered Date(s) Administered    COVID-19 Monovalent 18+ (Moderna) 04/29/2021, 06/01/2021    Influenza Vaccine >6 months,quad, PF 12/02/2014, 01/02/2017    Mantoux Tuberculin Skin Test 10/19/2015    Pneumococcal 23 valent 09/26/2007    TD,PF 7+ (Tenivac) 05/23/2005    TDAP Vaccine (Boostrix) 02/20/2014       Past Medical History:   Diagnosis Date    Allergic rhinitis 09/27/2011         Disorder of kidney and ureter 08/08/2008    Kidney disease GFR 87    Dorsalgia     No Comments Provided    Generalized anxiety disorder     No Comments Provided    Hidradenitis suppurativa     No Comments Provided    Other disorders of lung (CODE) 08/01/2007    Pulmonary nodules, stable on follow-up chest CT 12/08.  No further imaging recommended.    Other specified disorders of Eustachian tube, unspecified ear 02/27/2012         Personal history of other medical treatment (CODE)     Childbirth x4    Rheumatoid arthritis (H) 02/27/2012         Tobacco use     No Comments Provided       Past Surgical History:   Procedure Laterality Date    ARTHROSCOPY KNEE  05/2017    Dr Torres    ARTHROSCOPY KNEE  07/20/2017    Dr Garza, lateral release, meniscal repair    ARTHROTOMY WRIST Left 2011    Dr. Torres    BONE MARROW BIOPSY, BONE SPECIMEN, NEEDLE/TROCAR Left 02/13/2025    Procedure: Bone marrow biopsy, bone specimen, needle/trocar;  Surgeon: Hema Mari MD;  Location:  OR    CHOLECYSTECTOMY  06/2007    Emergency tracheotomy following failed intubation for laparoscopic cholecystectomy.    DILATION AND CURETTAGE  09/2006         HERNIA REPAIR  2007    Incisoinal hernia repair    HYSTERECTOMY TOTAL ABDOMINAL  02/2010         IMPLANT STIMULATOR AND LEADS SACRAL NERVE (STAGE ONE AND TWO) N/A 12/11/2018    Procedure: Interstim Battery Replacement;  Surgeon: Rl Ambriz MD;  Location:  OR    INTERSTIM DEVICE STAGE 2  01/06/2014    Dr. Ambriz [facility]    LAPAROSCOPIC ABLATION ENDOMETRIOSIS  09/2006          OOPHORECTOMY Left 2010     Dr Thorpe    PICC INSERTION - DOUBLE LUMEN Right 03/05/2025    5FR, DL, total cath length=42 CM, visible cath length= 3CM, brachial medial vein    RECONSTRUCT FOREFOOT WITH METATARSOPHALANGEAL (MTP) FUSION Right 05/05/2022    Procedure: Right fibular sesmoid excision and 1st metatarsal phalangeal joint fusion;  Surgeon: Rl Nathan DPM;  Location: [facility] OR    RELEASE CARPAL TUNNEL  02/02/2017         RELEASE PLANTAR FASCIA Left 12/19/2024    Procedure: excision of soft tissue mass left foot,  gastroc recession;  Surgeon: Rl Nathan DPM;  Location: [facility] OR    REMOVE HARDWARE FOOT Right 08/10/2023    Procedure: REMOVAL, HARDWARE, FOOT;  Surgeon: Rl Nathan DPM;  Location: [facility] OR    SALPINGO OOPHORECTOMY,R/L/KELSEY Right 06/1999    Right salpingo-oophorectomy for hemorrhagic corpus luteum cyst    TRACHEOSTOMY  2007    emergency following failed intubation during cholecystectomy    TYMPANOSTOMY, LOCAL/TOPICAL ANESTHESIA  08/2006    PE tube placement       SOCIAL HISTORY  History   Smoking Status    Every Day    Types: Cigarettes   Smokeless Tobacco    Never    Social History    Substance and Sexual Activity      Alcohol use: No        Alcohol/week: 0.0 standard drinks of alcohol     History   Drug Use No       FAMILY HISTORY  Family History   Problem Relation Age of Onset    Arthritis Mother         Arthritis,Rheumatoid    Cancer Mother         Cancer, lung and uterine cancer    Heart Disease Father         Heart Disease,CAD, CABG, peripheral vascular dise    Thyroid Disease Father         Thyroid Disease, hyperlipidemia    Other - See Comments Father         djd    Asthma Brother         Asthma    Asthma Sister         Asthma    Other - See Comments Sister         Psychiatric illness,Anxiety    Other - See Comments Son         x2 back surgeries    Breast Cancer No family hx of         Cancer-breast       PHYSICAL EXAMINATION  BP (!) 148/82 (BP Location: Right arm, Patient Position:  Sitting, Cuff Size: Adult Large)   Pulse 94   Temp 100  F (37.8  C) (Tympanic)   Resp 28   Wt 82.1 kg (181 lb)   LMP 01/01/2007 (Approximate)   SpO2 94%   BMI 29.21 kg/m    Wt Readings from Last 2 Encounters:   04/28/25 82.1 kg (181 lb)   04/28/25 81.7 kg (180 lb 3.2 oz)     Physical Exam  Cardiovascular:      Rate and Rhythm: Normal rate.   Pulmonary:      Effort: Pulmonary effort is normal.   Neurological:      General: No focal deficit present.      Mental Status: She is alert and oriented to person, place, and time.   Psychiatric:         Mood and Affect: Mood normal.         Behavior: Behavior normal.     Laboratory and Imaging:     Latest Reference Range & Units 04/28/25 06:20   WBC 4.0 - 11.0 10e3/uL 6.7   Hemoglobin 11.7 - 15.7 g/dL 9.1 (L)   Hematocrit 35.0 - 47.0 % 27.3 (L)   Platelet Count 150 - 450 10e3/uL 259   (L): Data is abnormally low  ASSESSMENT AND PLAN    1.  Acute myeloid leukemia with NPMI mutation:    NGS also showed NRAS and IDH 2 mutation.      Received induction with 7+3 on 3/5/2025.  Her course was complicated 24 episodes of neutropenic fever and epigastric pain and reflux symptoms.    Admit treatment marrow on 3/19/2025 showed complete response with cellularity less than 5%.  Her bone marrow biopsy on 3/31/2011 showed recovery of her bone marrow without any dysplasia.    She received her first cycle of consolidation with HiDAC on 4/3/2025.  She also received Neulasta on 4/7/2025.    She was doing well up to few days ago when she developed cough fever and shakiness.  She was admitted to the hospital on 4/27/2025..Chest x-ray done 4/27/2025 showed a small area of new infiltrate versus atelectasis left lung base.  Viral panel was negative.  Blood culture was negative.  Lactic acid was within normal limits.  She was discharged on 7 days of cefdinir and 4 days of azithromycin.  She was supposed to to [facility] consolidation with HiDAC.  However based on  her symptoms we will get in touch with the [facility] team to let them know how she is doing.  She also had a hemoglobin of 6.6 on 4/27/2025.  She did receive PRBC transfusion.  Hemoglobin has improved to 9.1 on 4/28/2025.    She is currently not neutropenic anymore.  We will continue on plan to do twice weekly labs.  She feels like her cough is stuck in her throat therefore Mucinex prescribed.  Also complaining of dry mouth.  Biotene spray prescribed    Will see her back in clinic in 1 month.  We can see her prior based on [facility]'s preference.    Total time spent on the patient on day of encounter was 30 minutes doing chart review, review of test results, interpretation of results, patient visit and documentation.       Samina Harris MD

## 2025-04-28 NOTE — PLAN OF CARE
"Goal Outcome Evaluation:    Pt a&o, vss, afebrile. Pt very sleepy arouses to gentle touch. More alert this AM. Pt clammy/moist. LS wheezy and crackles.  Received 2 units of blood tolerating well. Assist of one to bathroom. Refused 0600 gabapentin. Also refused colace and pepcid @ 0200.    /75   Pulse 74   Temp (!) 96.1  F (35.6  C) (Tympanic)   Resp 20   Ht 1.676 m (5' 6\")   Wt 81.7 kg (180 lb 3.2 oz)   LMP 01/01/2007 (Approximate)   SpO2 95%   BMI 29.09 kg/m          "

## 2025-04-28 NOTE — PROGRESS NOTES
NSG DISCHARGE NOTE    Patient discharged to home at 9:05 AM via wheel chair. Accompanied by staff. Discharge instructions reviewed with patient, opportunity offered to ask questions. Prescriptions sent to patients preferred pharmacy. All belongings sent with patient.    Korina Rowe RN

## 2025-04-28 NOTE — ED TRIAGE NOTES
"Patient being evaluated today for cough, SOB, and fever. She is here with family. Patient is actively undergoing chemotherapy treatments for leukemia. Her last treatment was 3 weeks ago. Fever started this am. Sepsis BPA triggered. /78   Pulse 104   Temp 100.6  F (38.1  C) (Tympanic)   Resp (!) 32   Ht 1.676 m (5' 6\")   Wt 81.2 kg (179 lb)   LMP 01/01/2007 (Approximate)   SpO2 95%   BMI 28.89 kg/m         Triage Assessment (Adult)       Row Name 04/27/25 2040          Triage Assessment    Airway WDL WDL        Respiratory WDL    Respiratory WDL X;rhythm/pattern;cough     Rhythm/Pattern, Respiratory shortness of breath;tachypneic     Cough Frequency infrequent        Skin Circulation/Temperature WDL    Skin Circulation/Temperature WDL WDL        Cardiac WDL    Cardiac WDL X;rhythm     Pulse Rate & Regularity tachycardic        Peripheral/Neurovascular WDL    Peripheral Neurovascular WDL WDL        Cognitive/Neuro/Behavioral WDL    Cognitive/Neuro/Behavioral WDL WDL        Iman Coma Scale    Best Eye Response 4-->(E4) spontaneous     Best Motor Response 6-->(M6) obeys commands     Best Verbal Response 5-->(V5) oriented     Iman Coma Scale Score 15                     "

## 2025-04-29 ENCOUNTER — PATIENT OUTREACH (OUTPATIENT)
Dept: FAMILY MEDICINE | Facility: OTHER | Age: 60
End: 2025-04-29
Payer: MEDICARE

## 2025-04-29 ENCOUNTER — HOSPITAL ENCOUNTER (EMERGENCY)
Facility: OTHER | Age: 60
Discharge: HOME | End: 2025-04-30
Attending: STUDENT IN AN ORGANIZED HEALTH CARE EDUCATION/TRAINING PROGRAM
Payer: MEDICARE

## 2025-04-29 VITALS
HEIGHT: 66 IN | HEART RATE: 85 BPM | OXYGEN SATURATION: 92 % | WEIGHT: 182 LBS | SYSTOLIC BLOOD PRESSURE: 149 MMHG | BODY MASS INDEX: 29.25 KG/M2 | RESPIRATION RATE: 20 BRPM | DIASTOLIC BLOOD PRESSURE: 78 MMHG | TEMPERATURE: 98.4 F

## 2025-04-29 DIAGNOSIS — Z09 HOSPITAL DISCHARGE FOLLOW-UP: ICD-10-CM

## 2025-04-29 DIAGNOSIS — R50.9 FEVER, UNSPECIFIED FEVER CAUSE: ICD-10-CM

## 2025-04-29 LAB
ALBUMIN SERPL BCG-MCNC: 3.4 G/DL (ref 3.5–5.2)
ALBUMIN UR-MCNC: NEGATIVE MG/DL
ALP SERPL-CCNC: 210 U/L (ref 40–150)
ALT SERPL W P-5'-P-CCNC: 26 U/L (ref 0–50)
ANION GAP SERPL CALCULATED.3IONS-SCNC: 13 MMOL/L (ref 7–15)
APPEARANCE UR: CLEAR
AST SERPL W P-5'-P-CCNC: 32 U/L (ref 0–45)
BASOPHILS # BLD AUTO: 0.1 10E3/UL (ref 0–0.2)
BASOPHILS NFR BLD AUTO: 1 %
BILIRUB SERPL-MCNC: 0.6 MG/DL
BILIRUB UR QL STRIP: NEGATIVE
BUN SERPL-MCNC: 6.8 MG/DL (ref 6–20)
CALCIUM SERPL-MCNC: 9 MG/DL (ref 8.8–10.4)
CHLORIDE SERPL-SCNC: 98 MMOL/L (ref 98–107)
COLOR UR AUTO: YELLOW
CREAT SERPL-MCNC: 0.7 MG/DL (ref 0.51–0.95)
EGFRCR SERPLBLD CKD-EPI 2021: >90 ML/MIN/1.73M2
EOSINOPHIL # BLD AUTO: 0.1 10E3/UL (ref 0–0.7)
EOSINOPHIL NFR BLD AUTO: 1 %
ERYTHROCYTE [DISTWIDTH] IN BLOOD BY AUTOMATED COUNT: 16.6 % (ref 10–15)
GLUCOSE SERPL-MCNC: 128 MG/DL (ref 70–99)
GLUCOSE UR STRIP-MCNC: NEGATIVE MG/DL
HCO3 SERPL-SCNC: 25 MMOL/L (ref 22–29)
HCT VFR BLD AUTO: 24.8 % (ref 35–47)
HGB BLD-MCNC: 8.5 G/DL (ref 11.7–15.7)
HGB UR QL STRIP: ABNORMAL
HOLD SPECIMEN: NORMAL
IMM GRANULOCYTES # BLD: 0.1 10E3/UL
IMM GRANULOCYTES NFR BLD: 1 %
KETONES UR STRIP-MCNC: NEGATIVE MG/DL
LEUKOCYTE ESTERASE UR QL STRIP: NEGATIVE
LYMPHOCYTES # BLD AUTO: 0.9 10E3/UL (ref 0.8–5.3)
LYMPHOCYTES NFR BLD AUTO: 10 %
MCH RBC QN AUTO: 30.5 PG (ref 26.5–33)
MCHC RBC AUTO-ENTMCNC: 34.3 G/DL (ref 31.5–36.5)
MCV RBC AUTO: 89 FL (ref 78–100)
MONOCYTES # BLD AUTO: 2.4 10E3/UL (ref 0–1.3)
MONOCYTES NFR BLD AUTO: 26 %
NEUTROPHILS # BLD AUTO: 5.8 10E3/UL (ref 1.6–8.3)
NEUTROPHILS NFR BLD AUTO: 62 %
NITRATE UR QL: NEGATIVE
NRBC # BLD AUTO: 0.1 10E3/UL
NRBC BLD AUTO-RTO: 1 /100
PH UR STRIP: 6.5 [PH] (ref 5–9)
PLATELET # BLD AUTO: 231 10E3/UL (ref 150–450)
POTASSIUM SERPL-SCNC: 3 MMOL/L (ref 3.4–5.3)
PROT SERPL-MCNC: 6.7 G/DL (ref 6.4–8.3)
RBC # BLD AUTO: 2.79 10E6/UL (ref 3.8–5.2)
RBC URINE: 1 /HPF
SODIUM SERPL-SCNC: 136 MMOL/L (ref 135–145)
SP GR UR STRIP: 1 (ref 1–1.03)
UROBILINOGEN UR STRIP-MCNC: NORMAL MG/DL
WBC # BLD AUTO: 9.3 10E3/UL (ref 4–11)
WBC URINE: 0 /HPF

## 2025-04-29 PROCEDURE — 36415 COLL VENOUS BLD VENIPUNCTURE: CPT | Performed by: STUDENT IN AN ORGANIZED HEALTH CARE EDUCATION/TRAINING PROGRAM

## 2025-04-29 PROCEDURE — 99284 EMERGENCY DEPT VISIT MOD MDM: CPT | Performed by: STUDENT IN AN ORGANIZED HEALTH CARE EDUCATION/TRAINING PROGRAM

## 2025-04-29 PROCEDURE — 87040 BLOOD CULTURE FOR BACTERIA: CPT | Performed by: STUDENT IN AN ORGANIZED HEALTH CARE EDUCATION/TRAINING PROGRAM

## 2025-04-29 PROCEDURE — 99283 EMERGENCY DEPT VISIT LOW MDM: CPT | Performed by: STUDENT IN AN ORGANIZED HEALTH CARE EDUCATION/TRAINING PROGRAM

## 2025-04-29 PROCEDURE — 87637 SARSCOV2&INF A&B&RSV AMP PRB: CPT | Performed by: STUDENT IN AN ORGANIZED HEALTH CARE EDUCATION/TRAINING PROGRAM

## 2025-04-29 PROCEDURE — 85025 COMPLETE CBC W/AUTO DIFF WBC: CPT | Performed by: STUDENT IN AN ORGANIZED HEALTH CARE EDUCATION/TRAINING PROGRAM

## 2025-04-29 PROCEDURE — 81001 URINALYSIS AUTO W/SCOPE: CPT | Performed by: STUDENT IN AN ORGANIZED HEALTH CARE EDUCATION/TRAINING PROGRAM

## 2025-04-29 PROCEDURE — 250N000013 HC RX MED GY IP 250 OP 250 PS 637: Performed by: STUDENT IN AN ORGANIZED HEALTH CARE EDUCATION/TRAINING PROGRAM

## 2025-04-29 PROCEDURE — 82565 ASSAY OF CREATININE: CPT | Performed by: STUDENT IN AN ORGANIZED HEALTH CARE EDUCATION/TRAINING PROGRAM

## 2025-04-29 RX ORDER — POTASSIUM CHLORIDE 1500 MG/1
40 TABLET, EXTENDED RELEASE ORAL ONCE
Status: COMPLETED | OUTPATIENT
Start: 2025-04-29 | End: 2025-04-29

## 2025-04-29 RX ADMIN — POTASSIUM CHLORIDE 40 MEQ: 1500 TABLET, EXTENDED RELEASE ORAL at 22:51

## 2025-04-29 ASSESSMENT — ACTIVITIES OF DAILY LIVING (ADL)
ADLS_ACUITY_SCORE: 57
ADLS_ACUITY_SCORE: 57

## 2025-04-29 NOTE — TELEPHONE ENCOUNTER
Hospital follow up call done 4/28/25 by oncology RN.  Patient also had appt with Dr. Harris.  Patient has hospital follow up on 5/5/25  Kathleen Bishop RN on 4/29/2025 at 11:07 AM

## 2025-04-30 ENCOUNTER — PATIENT OUTREACH (OUTPATIENT)
Dept: ONCOLOGY | Facility: OTHER | Age: 60
End: 2025-04-30
Payer: MEDICARE

## 2025-04-30 LAB
FLUAV RNA SPEC QL NAA+PROBE: NEGATIVE
FLUBV RNA RESP QL NAA+PROBE: NEGATIVE
RSV RNA SPEC NAA+PROBE: NEGATIVE
SARS-COV-2 RNA RESP QL NAA+PROBE: NEGATIVE

## 2025-04-30 NOTE — ED PROVIDER NOTES
History     Chief Complaint   Patient presents with    Fever       Alejandra Villegas is a 59 year old female who presents with fever.  History includes AML not in remission.  Tmax 102.1 at home today.  She and her son checked her temperature just to make sure she was doing okay when this was discovered.  Discharged yesterday for pneumonia on antibiotics and feeling like her pneumonia continues to improve.  Denies any worsening cough, shortness of breath, new pain, diarrhea, dysuria, skin wounds, chills.      Allergies   Allergen Reactions    Adhesive Tape     Bee Pollen Swelling     Seasonal    Bee Venom Unknown    Folic Acid Itching    Pollen Extract      Seasonal    Amoxicillin Rash    Chlorhexidine Rash     After several weeks, started to develop rash on R neck down to chest and arm. Also noted on back of knees. Providers suggesting related to CHG as nothing else is new for pt.     Liquid Adhesive Rash    Meloxicam Rash    Nabumetone GI Disturbance     GI upset       Patient Active Problem List    Diagnosis Date Noted    Hypokalemia 04/28/2025     Priority: Medium    Anemia of chronic disease 04/27/2025     Priority: Medium    Community acquired pneumonia of left lower lobe of lung 04/27/2025     Priority: Medium    Pancytopenia due to antineoplastic chemotherapy 04/06/2025     Priority: Medium    Hiatal hernia 04/06/2025     Priority: Medium    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) 03/04/2025     Priority: Medium    Anemia 02/26/2025     Priority: Medium    Leukopenia 02/26/2025     Priority: Medium    Macrocytic anemia 02/13/2025     Priority: Medium    Pulmonary emphysema, unspecified emphysema type (H) 01/23/2024     Priority: Medium    Benign essential hypertension 01/23/2024     Priority: Medium    Mixed hyperlipidemia 01/23/2024     Priority: Medium    Pain in joint, ankle and foot, left 10/05/2022     Priority: Medium    CKD (chronic kidney disease) stage 2, GFR 60-89 ml/min  03/31/2022     Priority: Medium    Osteopenia of right foot 02/03/2021     Priority: Medium    Anxiety state 01/30/2018     Priority: Medium    Other isolated or specific phobias 01/30/2018     Priority: Medium    Degenerative disc disease at L5-S1 level 01/30/2018     Priority: Medium    Hidradenitis suppurativa 01/30/2018     Priority: Medium    Tobacco use disorder 01/30/2018     Priority: Medium    Gastroesophageal reflux disease 01/18/2017     Priority: Medium    Pain management contract broken 06/05/2015     Priority: Medium     Overview:   Contract violation - see 12/1/15 letter.      Urge incontinence 06/04/2014     Priority: Medium    Alopecia areata 02/21/2014     Priority: Medium    Benign paroxysmal positional vertigo 02/28/2013     Priority: Medium    Ovarian cyst, left 05/09/2012     Priority: Medium    Other acquired deformity of ankle and foot(736.79) 05/09/2012     Priority: Medium    RA (rheumatoid arthritis) (H) 05/09/2012     Priority: Medium     Overview:   Diagnosed [place] 2012      Asthma 05/04/2012     Priority: Medium    Seasonal allergic rhinitis 09/27/2011     Priority: Medium    Symptomatic menopausal or female climacteric states 09/21/2010     Priority: Medium    Other diseases of lung, not elsewhere classified 08/01/2007     Priority: Medium       Past Medical History:   Diagnosis Date    Allergic rhinitis 09/27/2011    Disorder of kidney and ureter 08/08/2008    Dorsalgia     Generalized anxiety disorder     Hidradenitis suppurativa     Other disorders of lung (CODE) 08/01/2007    Other specified disorders of Eustachian tube, unspecified ear 02/27/2012    Personal history of other medical treatment (CODE)     Rheumatoid arthritis (H) 02/27/2012    Tobacco use        Past Surgical History:   Procedure Laterality Date    ARTHROSCOPY KNEE  05/2017    Dr Torres    ARTHROSCOPY KNEE  07/20/2017    Dr Garza, lateral release, meniscal repair    ARTHROTOMY WRIST Left 2011    Dr. Torres     BONE MARROW BIOPSY, BONE SPECIMEN, NEEDLE/TROCAR Left 02/13/2025    Procedure: Bone marrow biopsy, bone specimen, needle/trocar;  Surgeon: Hema Mari MD;  Location:  OR    CHOLECYSTECTOMY  06/2007    Emergency tracheotomy following failed intubation for laparoscopic cholecystectomy.    DILATION AND CURETTAGE  09/2006         HERNIA REPAIR  2007    Incisoinal hernia repair    HYSTERECTOMY TOTAL ABDOMINAL  02/2010         IMPLANT STIMULATOR AND LEADS SACRAL NERVE (STAGE ONE AND TWO) N/A 12/11/2018    Procedure: Interstim Battery Replacement;  Surgeon: Rl Ambriz MD;  Location:  OR    INTERSTIM DEVICE STAGE 2  01/06/2014    Dr. Ambriz - [facility]    LAPAROSCOPIC ABLATION ENDOMETRIOSIS  09/2006         OOPHORECTOMY Left 2010     Dr Thorpe    PICC INSERTION - DOUBLE LUMEN Right 03/05/2025    5FR, DL, total cath length=42 CM, visible cath length= 3CM, brachial medial vein    RECONSTRUCT FOREFOOT WITH METATARSOPHALANGEAL (MTP) FUSION Right 05/05/2022    Procedure: Right fibular sesmoid excision and 1st metatarsal phalangeal joint fusion;  Surgeon: Rl Nathan DPM;  Location:  OR    RELEASE CARPAL TUNNEL  02/02/2017         RELEASE PLANTAR FASCIA Left 12/19/2024    Procedure: excision of soft tissue mass left foot,  gastroc recession;  Surgeon: Rl Nathan DPM;  Location:  OR    REMOVE HARDWARE FOOT Right 08/10/2023    Procedure: REMOVAL, HARDWARE, FOOT;  Surgeon: Rl Nathan DPM;  Location:  OR    SALPINGO OOPHORECTOMY,R/L/KELSEY Right 06/1999    Right salpingo-oophorectomy for hemorrhagic corpus luteum cyst    TRACHEOSTOMY  2007    emergency following failed intubation during cholecystectomy    TYMPANOSTOMY, LOCAL/TOPICAL ANESTHESIA  08/2006    PE tube placement       Family History   Problem Relation Age of Onset    Arthritis Mother         Arthritis,Rheumatoid    Cancer Mother         Cancer, lung and uterine cancer    Heart Disease Father         Heart Disease,CAD, CABG, peripheral vascular  dise    Thyroid Disease Father         Thyroid Disease, hyperlipidemia    Other - See Comments Father         djd    Asthma Brother         Asthma    Asthma Sister         Asthma    Other - See Comments Sister         Psychiatric illness,Anxiety    Other - See Comments Son         x2 back surgeries    Breast Cancer No family hx of         Cancer-breast       Social History     Tobacco Use    Smoking status: Every Day     Current packs/day: 1.00     Average packs/day: 1 pack/day for 44.3 years (44.3 ttl pk-yrs)     Types: Cigarettes     Start date: 1981     Passive exposure: Past    Smokeless tobacco: Never    Tobacco comments:     Passive exposure in childhood and adult homes and some work places   Vaping Use    Vaping status: Never Used   Substance Use Topics    Alcohol use: No     Alcohol/week: 0.0 standard drinks of alcohol    Drug use: No       Medications:    acetaminophen (TYLENOL) 500 MG tablet  acyclovir (ZOVIRAX) 400 MG tablet  albuterol (PROAIR HFA/PROVENTIL HFA/VENTOLIN HFA) 108 (90 Base) MCG/ACT inhaler  artificial saliva (BIOTENE MT) AERS spray  azithromycin (ZITHROMAX) 250 MG tablet  budesonide-formoterol (SYMBICORT) 80-4.5 MCG/ACT Inhaler  calcium carbonate (TUMS) 500 MG chewable tablet  cefdinir (OMNICEF) 300 MG capsule  cyanocobalamin (VITAMIN B-12) 500 MCG tablet  cyclobenzaprine (FLEXERIL) 10 MG tablet  famotidine (PEPCID) 20 MG tablet  fluticasone (FLONASE) 50 MCG/ACT nasal spray  gabapentin (NEURONTIN) 400 MG capsule  guaiFENesin (MUCINEX) 600 MG 12 hr tablet  hydrOXYzine HCl (ATARAX) 25 MG tablet  ipratropium - albuterol 0.5 mg/2.5 mg/3 mL (DUONEB) 0.5-2.5 (3) MG/3ML neb solution  levofloxacin (LEVAQUIN) 250 MG tablet  loratadine (CLARITIN) 10 MG tablet  ondansetron (ZOFRAN ODT) 4 MG ODT tab  order for DME  pantoprazole (PROTONIX) 40 MG EC tablet  PARoxetine (PAXIL) 20 MG tablet  posaconazole (NOXAFIL) 100 MG DR tablet  prednisoLONE acetate (PRED FORTE) 1 % ophthalmic  "suspension  prochlorperazine (COMPAZINE) 5 MG tablet  rosuvastatin (CRESTOR) 20 MG tablet  sucralfate (CARAFATE) 1 GM/10ML suspension  SUMAtriptan (IMITREX) 50 MG tablet  Vitamin D, Cholecalciferol, 25 MCG (1000 UT) CAPS        Review of Systems: See HPI for pertinent negatives and positives. All other systems reviewed and found to be negative.    Physical Exam   BP (!) 149/78   Pulse 85   Temp 98.4  F (36.9  C)   Resp 20   Ht 1.676 m (5' 6\")   Wt 82.6 kg (182 lb)   LMP 01/01/2007 (Approximate)   SpO2 92%   BMI 29.38 kg/m       General: awake, comfortable  HEENT: atraumatic  Respiratory: normal effort, clear to auscultation bilaterally except for trace scattered diffuse wheeze  Cardiovascular: regular rate and rhythm, no murmurs  Abdomen: soft, nondistended, nontender  Extremities: no deformities, edema, or tenderness  Skin: warm, dry, no rashes  Neuro: alert, no focal deficits  Psych: appropriate mood and affect    ED Course      Results for orders placed or performed during the hospital encounter of 04/29/25 (from the past 24 hours)   CBC with platelets differential    Narrative    The following orders were created for panel order CBC with platelets differential.  Procedure                               Abnormality         Status                     ---------                               -----------         ------                     CBC with platelets and ...[[id number]]  Abnormal            Final result               RBC and Platelet Morpho...[[id number]]                                                   Please view results for these tests on the individual orders.   Comprehensive metabolic panel   Result Value Ref Range    Sodium 136 135 - 145 mmol/L    Potassium 3.0 (L) 3.4 - 5.3 mmol/L    Carbon Dioxide (CO2) 25 22 - 29 mmol/L    Anion Gap 13 7 - 15 mmol/L    Urea Nitrogen 6.8 6.0 - 20.0 mg/dL    Creatinine 0.70 0.51 - 0.95 mg/dL    GFR Estimate >90 >60 mL/min/1.73m2    Calcium 9.0 8.8 - 10.4 mg/dL "    Chloride 98 98 - 107 mmol/L    Glucose 128 (H) 70 - 99 mg/dL    Alkaline Phosphatase 210 (H) 40 - 150 U/L    AST 32 0 - 45 U/L    ALT 26 0 - 50 U/L    Protein Total 6.7 6.4 - 8.3 g/dL    Albumin 3.4 (L) 3.5 - 5.2 g/dL    Bilirubin Total 0.6 <=1.2 mg/dL   Wann Draw    Narrative    The following orders were created for panel order Wann Draw.  Procedure                               Abnormality         Status                     ---------                               -----------         ------                     Extra Blue Top Tube[[id number]]                             In process                 Extra Red Top Tube[[id number]]                              In process                 Extra Green Top (Lithiu...[[id number]]                      In process                   Please view results for these tests on the individual orders.   CBC with platelets and differential   Result Value Ref Range    WBC Count 9.3 4.0 - 11.0 10e3/uL    RBC Count 2.79 (L) 3.80 - 5.20 10e6/uL    Hemoglobin 8.5 (L) 11.7 - 15.7 g/dL    Hematocrit 24.8 (L) 35.0 - 47.0 %    MCV 89 78 - 100 fL    MCH 30.5 26.5 - 33.0 pg    MCHC 34.3 31.5 - 36.5 g/dL    RDW 16.6 (H) 10.0 - 15.0 %    Platelet Count 231 150 - 450 10e3/uL    % Neutrophils 62 %    % Lymphocytes 10 %    % Monocytes 26 %    % Eosinophils 1 %    % Basophils 1 %    % Immature Granulocytes 1 %    NRBCs per 100 WBC 1 (H) <1 /100    Absolute Neutrophils 5.8 1.6 - 8.3 10e3/uL    Absolute Lymphocytes 0.9 0.8 - 5.3 10e3/uL    Absolute Monocytes 2.4 (H) 0.0 - 1.3 10e3/uL    Absolute Eosinophils 0.1 0.0 - 0.7 10e3/uL    Absolute Basophils 0.1 0.0 - 0.2 10e3/uL    Absolute Immature Granulocytes 0.1 <=0.4 10e3/uL    Absolute NRBCs 0.1 10e3/uL       Medications   potassium chloride sebastian ER (KLOR-CON M20) CR tablet 40 mEq (has no administration in time range)       Assessments & Plan (with Medical Decision Making)     I have reviewed the nursing notes.          59 year old female evaluated  for fever 102F after being discharged from [facility] yesterday for treatment for pneumonia in context of active AML.  Afebrile here.  Labs are reassuring.  Mild hypokalemia are supplemented.  Blood cultures were collected to follow in case there is positive growth.  Most recent blood cultures collected a couple days ago were negative.  No obvious new source of infection.  At this point suspect this may be episodic fever that is persisting with her pneumonia.  Patient will continue her antibiotics for pneumonia if she has been having symptomatic improvement which is reassuring.  We will follow blood cultures. Discharged home with below plan.    I have reviewed the findings, diagnosis, plan, and need for any follow up with the patient.    Patient instructions:   Labs were reassuring. We will follow blood cultures and if positive you will be contacted. Continue your antibiotics for your pneumonia.    Please review attached instructions including reasons to return to the emergency department, including any new concerning symptoms such as worsening cough, shortness of breath, new pain, or concerning skin wounds.    New Prescriptions    No medications on file       Final diagnoses:   None       4/29/2025   [facility]     Rad Gold MD  04/30/25 1249

## 2025-04-30 NOTE — DISCHARGE INSTRUCTIONS
Labs were reassuring. We will follow blood cultures and if positive you will be contacted. Continue your antibiotics for your pneumonia.    Please review attached instructions including reasons to return to the emergency department, including any new concerning symptoms such as worsening cough, shortness of breath, new pain, or concerning skin wounds.

## 2025-04-30 NOTE — PROGRESS NOTES
[facility]: Transitions of Care Note  Chief Complaint   Patient presents with    Clinic Care Coordination - Post Hospital       Most Recent Admission Date: 4/29/2025   Most Recent Admission Diagnosis:      Most Recent Discharge Date: 4/30/2025   Most Recent Discharge Diagnosis: Fever, unspecified fever cause - R50.9     Transitions of Care Assessment    Discharge Assessment  How are your symptoms? (Red Flag symptoms escalate to triage hotline per guidelines): Improved  Do you know how to contact your clinic care team if you have future questions or changes to your health status? : Yes  Does the patient have their discharge instructions? : Yes  Does the patient have questions regarding their discharge instructions? : No  Were you started on any new medications or were there changes to any of your previous medications? : Yes  Does the patient have all of their medications?: Yes  Do you have questions regarding any of your medications? : No  Do you have all of your needed medical supplies or equipment (DME)?  (i.e. oxygen tank, CPAP, cane, etc.): Yes         Patient states that she good today.    Follow up Plan     Discharge Follow-Up  Discharge follow up appointment scheduled in alignment with recommended follow up timeframe or Transitions of Risk Category? (Low = within 30 days; Moderate= within 14 days; High= within 7 days): No    Future Appointments   Date Time Provider Department Center   5/1/2025  1:00 PM [facility] [place]   5/5/2025 10:00 AM Jeanna Britt APRN CNP [facility] [place]   5/5/2025  1:10 PM [facility] [place]   5/8/2025  1:20 PM [facility] [place] [facility]   5/12/2025  1:10 PM [facility] [place]   5/15/2025  1:00 PM [facility] [place] [facility]   5/19/2025  1:00 PM [facility] [place] [facility]   5/22/2025 12:50 PM [facility] [place]   5/29/2025  1:00 PM [facility] [place]   6/2/2025  1:00 PM [facility] [place]   6/5/2025 12:50 PM [facility]  [place]   6/20/2025  1:30 PM Samina Harris MD [facility] [place]       Outpatient Plan as outlined on AVS reviewed with patient.    For any urgent concerns, please contact our 24 hour clinic line:          Lisha Winkler RN

## 2025-04-30 NOTE — ED TRIAGE NOTES
Patient arrives today with c/o a fever. Patient does have a history of leukemia and is currently undergoing chemo with her last cycle being approximately 4 weeks ago. She was diagnosed with pneumonia 2 days ago and was told that she has to get rid of the infection before having anymore chemotherapy. Patient used a nebulizer around 1800 and took Tylenol just prior to coming in where she noted a fever 100.2F.      Triage Assessment (Adult)       Row Name 04/29/25 2451          Triage Assessment    Airway WDL WDL        Respiratory WDL    Respiratory WDL X;cough     Cough Frequency infrequent        Skin Circulation/Temperature WDL    Skin Circulation/Temperature WDL WDL        Cardiac WDL    Cardiac WDL WDL        Peripheral/Neurovascular WDL    Peripheral Neurovascular WDL WDL        Cognitive/Neuro/Behavioral WDL    Cognitive/Neuro/Behavioral WDL WDL

## 2025-05-01 ENCOUNTER — LAB (OUTPATIENT)
Dept: LAB | Facility: OTHER | Age: 60
End: 2025-05-01
Attending: INTERNAL MEDICINE
Payer: MEDICARE

## 2025-05-01 DIAGNOSIS — C92.00 ACUTE MYELOID LEUKEMIA NOT HAVING ACHIEVED REMISSION (H): ICD-10-CM

## 2025-05-01 LAB
ALBUMIN SERPL BCG-MCNC: 3.5 G/DL (ref 3.5–5.2)
ALP SERPL-CCNC: 184 U/L (ref 40–150)
ALT SERPL W P-5'-P-CCNC: 26 U/L (ref 0–50)
ANION GAP SERPL CALCULATED.3IONS-SCNC: 15 MMOL/L (ref 7–15)
AST SERPL W P-5'-P-CCNC: 29 U/L (ref 0–45)
BACTERIA BLD CULT: NORMAL
BACTERIA BLD CULT: NORMAL
BASOPHILS # BLD AUTO: 0.1 10E3/UL (ref 0–0.2)
BASOPHILS NFR BLD AUTO: 1 %
BILIRUB SERPL-MCNC: 0.4 MG/DL
BUN SERPL-MCNC: 10.1 MG/DL (ref 6–20)
CALCIUM SERPL-MCNC: 9.3 MG/DL (ref 8.8–10.4)
CHLORIDE SERPL-SCNC: 103 MMOL/L (ref 98–107)
CREAT SERPL-MCNC: 0.63 MG/DL (ref 0.51–0.95)
EGFRCR SERPLBLD CKD-EPI 2021: >90 ML/MIN/1.73M2
EOSINOPHIL # BLD AUTO: 0.2 10E3/UL (ref 0–0.7)
EOSINOPHIL NFR BLD AUTO: 2 %
ERYTHROCYTE [DISTWIDTH] IN BLOOD BY AUTOMATED COUNT: 17.2 % (ref 10–15)
GLUCOSE SERPL-MCNC: 121 MG/DL (ref 70–99)
HCO3 SERPL-SCNC: 26 MMOL/L (ref 22–29)
HCT VFR BLD AUTO: 28.2 % (ref 35–47)
HGB BLD-MCNC: 9.1 G/DL (ref 11.7–15.7)
IMM GRANULOCYTES # BLD: 0.1 10E3/UL
IMM GRANULOCYTES NFR BLD: 1 %
LYMPHOCYTES # BLD AUTO: 0.8 10E3/UL (ref 0.8–5.3)
LYMPHOCYTES NFR BLD AUTO: 11 %
MCH RBC QN AUTO: 29.8 PG (ref 26.5–33)
MCHC RBC AUTO-ENTMCNC: 32.3 G/DL (ref 31.5–36.5)
MCV RBC AUTO: 93 FL (ref 78–100)
MONOCYTES # BLD AUTO: 1.7 10E3/UL (ref 0–1.3)
MONOCYTES NFR BLD AUTO: 23 %
NEUTROPHILS # BLD AUTO: 4.3 10E3/UL (ref 1.6–8.3)
NEUTROPHILS NFR BLD AUTO: 61 %
NRBC # BLD AUTO: 0.1 10E3/UL
NRBC BLD AUTO-RTO: 1 /100
PLAT MORPH BLD: NORMAL
PLATELET # BLD AUTO: 235 10E3/UL (ref 150–450)
POTASSIUM SERPL-SCNC: 3.1 MMOL/L (ref 3.4–5.3)
PROT SERPL-MCNC: 7 G/DL (ref 6.4–8.3)
RBC # BLD AUTO: 3.05 10E6/UL (ref 3.8–5.2)
RBC MORPH BLD: NORMAL
SODIUM SERPL-SCNC: 144 MMOL/L (ref 135–145)
WBC # BLD AUTO: 7.1 10E3/UL (ref 4–11)

## 2025-05-01 PROCEDURE — 36415 COLL VENOUS BLD VENIPUNCTURE: CPT | Mod: ZL

## 2025-05-01 PROCEDURE — 82310 ASSAY OF CALCIUM: CPT | Mod: ZL

## 2025-05-01 PROCEDURE — 85004 AUTOMATED DIFF WBC COUNT: CPT | Mod: ZL

## 2025-05-04 LAB
BACTERIA BLD CULT: NO GROWTH
BACTERIA BLD CULT: NO GROWTH

## 2025-05-05 ENCOUNTER — LAB (OUTPATIENT)
Dept: LAB | Facility: OTHER | Age: 60
End: 2025-05-05
Attending: INTERNAL MEDICINE
Payer: MEDICARE

## 2025-05-05 DIAGNOSIS — C92.00 ACUTE MYELOID LEUKEMIA NOT HAVING ACHIEVED REMISSION (H): ICD-10-CM

## 2025-05-05 LAB
ALBUMIN SERPL BCG-MCNC: 3.7 G/DL (ref 3.5–5.2)
ALP SERPL-CCNC: 146 U/L (ref 40–150)
ALT SERPL W P-5'-P-CCNC: 18 U/L (ref 0–50)
ANION GAP SERPL CALCULATED.3IONS-SCNC: 15 MMOL/L (ref 7–15)
AST SERPL W P-5'-P-CCNC: 22 U/L (ref 0–45)
BASOPHILS # BLD MANUAL: 0 10E3/UL (ref 0–0.2)
BASOPHILS NFR BLD MANUAL: 0 %
BILIRUB SERPL-MCNC: 0.5 MG/DL
BUN SERPL-MCNC: 9.7 MG/DL (ref 6–20)
CALCIUM SERPL-MCNC: 9.5 MG/DL (ref 8.8–10.4)
CHLORIDE SERPL-SCNC: 103 MMOL/L (ref 98–107)
CREAT SERPL-MCNC: 0.76 MG/DL (ref 0.51–0.95)
EGFRCR SERPLBLD CKD-EPI 2021: >90 ML/MIN/1.73M2
EOSINOPHIL # BLD MANUAL: 0 10E3/UL (ref 0–0.7)
EOSINOPHIL NFR BLD MANUAL: 0 %
ERYTHROCYTE [DISTWIDTH] IN BLOOD BY AUTOMATED COUNT: 19 % (ref 10–15)
GLUCOSE SERPL-MCNC: 144 MG/DL (ref 70–99)
HCO3 SERPL-SCNC: 23 MMOL/L (ref 22–29)
HCT VFR BLD AUTO: 32.4 % (ref 35–47)
HGB BLD-MCNC: 10.3 G/DL (ref 11.7–15.7)
LYMPHOCYTES # BLD MANUAL: 1.7 10E3/UL (ref 0.8–5.3)
LYMPHOCYTES NFR BLD MANUAL: 16 %
MCH RBC QN AUTO: 29.5 PG (ref 26.5–33)
MCHC RBC AUTO-ENTMCNC: 31.8 G/DL (ref 31.5–36.5)
MCV RBC AUTO: 93 FL (ref 78–100)
MONOCYTES # BLD MANUAL: 2.5 10E3/UL (ref 0–1.3)
MONOCYTES NFR BLD MANUAL: 24 %
NEUTROPHILS # BLD MANUAL: 6.3 10E3/UL (ref 1.6–8.3)
NEUTROPHILS NFR BLD MANUAL: 60 %
PLAT MORPH BLD: ABNORMAL
PLATELET # BLD AUTO: 309 10E3/UL (ref 150–450)
POLYCHROMASIA BLD QL SMEAR: SLIGHT
POTASSIUM SERPL-SCNC: 3.9 MMOL/L (ref 3.4–5.3)
PROT SERPL-MCNC: 7.4 G/DL (ref 6.4–8.3)
RBC # BLD AUTO: 3.49 10E6/UL (ref 3.8–5.2)
RBC MORPH BLD: ABNORMAL
SODIUM SERPL-SCNC: 141 MMOL/L (ref 135–145)
WBC # BLD AUTO: 10.5 10E3/UL (ref 4–11)

## 2025-05-05 PROCEDURE — 85007 BL SMEAR W/DIFF WBC COUNT: CPT | Mod: ZL

## 2025-05-05 PROCEDURE — 36415 COLL VENOUS BLD VENIPUNCTURE: CPT | Mod: ZL

## 2025-05-05 PROCEDURE — 85027 COMPLETE CBC AUTOMATED: CPT | Mod: ZL

## 2025-05-05 PROCEDURE — 82565 ASSAY OF CREATININE: CPT | Mod: ZL

## 2025-05-06 ENCOUNTER — HOSPITAL ENCOUNTER (INPATIENT)
Facility: CLINIC | Age: 60
End: 2025-05-06
Attending: STUDENT IN AN ORGANIZED HEALTH CARE EDUCATION/TRAINING PROGRAM | Admitting: STUDENT IN AN ORGANIZED HEALTH CARE EDUCATION/TRAINING PROGRAM
Payer: MEDICARE

## 2025-05-06 DIAGNOSIS — G43.809 OTHER MIGRAINE WITHOUT STATUS MIGRAINOSUS, NOT INTRACTABLE: ICD-10-CM

## 2025-05-06 DIAGNOSIS — E78.2 MIXED HYPERLIPIDEMIA: ICD-10-CM

## 2025-05-06 DIAGNOSIS — R11.2 NAUSEA AND VOMITING, UNSPECIFIED VOMITING TYPE: ICD-10-CM

## 2025-05-06 DIAGNOSIS — H69.93 DYSFUNCTION OF BOTH EUSTACHIAN TUBES: ICD-10-CM

## 2025-05-06 DIAGNOSIS — F41.9 CHRONIC ANXIETY: ICD-10-CM

## 2025-05-06 DIAGNOSIS — J43.9 PULMONARY EMPHYSEMA, UNSPECIFIED EMPHYSEMA TYPE (H): ICD-10-CM

## 2025-05-06 DIAGNOSIS — E87.6 HYPOKALEMIA: ICD-10-CM

## 2025-05-06 DIAGNOSIS — C92.01 ACUTE MYELOID LEUKEMIA IN REMISSION (H): ICD-10-CM

## 2025-05-06 DIAGNOSIS — J44.1 COPD EXACERBATION (H): ICD-10-CM

## 2025-05-06 DIAGNOSIS — C92.00 ACUTE MYELOID LEUKEMIA NOT HAVING ACHIEVED REMISSION (H): Primary | ICD-10-CM

## 2025-05-06 DIAGNOSIS — J31.0 RHINITIS, UNSPECIFIED TYPE: ICD-10-CM

## 2025-05-06 DIAGNOSIS — K21.9 GASTROESOPHAGEAL REFLUX DISEASE, UNSPECIFIED WHETHER ESOPHAGITIS PRESENT: ICD-10-CM

## 2025-05-06 DIAGNOSIS — E55.9 VITAMIN D DEFICIENCY: ICD-10-CM

## 2025-05-06 LAB
ALBUMIN SERPL BCG-MCNC: 3.8 G/DL (ref 3.5–5.2)
ALBUMIN SERPL BCG-MCNC: 3.8 G/DL (ref 3.5–5.2)
ALP SERPL-CCNC: 137 U/L (ref 40–150)
ALP SERPL-CCNC: 140 U/L (ref 40–150)
ALT SERPL W P-5'-P-CCNC: 17 U/L (ref 0–50)
ALT SERPL W P-5'-P-CCNC: 19 U/L (ref 0–50)
ANION GAP SERPL CALCULATED.3IONS-SCNC: 12 MMOL/L (ref 7–15)
ANION GAP SERPL CALCULATED.3IONS-SCNC: 14 MMOL/L (ref 7–15)
APTT PPP: 28 SECONDS (ref 22–38)
AST SERPL W P-5'-P-CCNC: 24 U/L (ref 0–45)
AST SERPL W P-5'-P-CCNC: 24 U/L (ref 0–45)
BASOPHILS # BLD AUTO: 0.1 10E3/UL (ref 0–0.2)
BASOPHILS # BLD MANUAL: 0.2 10E3/UL (ref 0–0.2)
BASOPHILS NFR BLD AUTO: 2 %
BASOPHILS NFR BLD MANUAL: 2 %
BILIRUB SERPL-MCNC: 0.4 MG/DL
BILIRUB SERPL-MCNC: 0.4 MG/DL
BUN SERPL-MCNC: 8.8 MG/DL (ref 6–20)
BUN SERPL-MCNC: 9.3 MG/DL (ref 6–20)
CALCIUM SERPL-MCNC: 9.5 MG/DL (ref 8.8–10.4)
CALCIUM SERPL-MCNC: 9.6 MG/DL (ref 8.8–10.4)
CHLORIDE SERPL-SCNC: 102 MMOL/L (ref 98–107)
CHLORIDE SERPL-SCNC: 102 MMOL/L (ref 98–107)
CREAT SERPL-MCNC: 0.74 MG/DL (ref 0.51–0.95)
CREAT SERPL-MCNC: 0.79 MG/DL (ref 0.51–0.95)
EGFRCR SERPLBLD CKD-EPI 2021: 87 ML/MIN/1.73M2
EGFRCR SERPLBLD CKD-EPI 2021: >90 ML/MIN/1.73M2
EOSINOPHIL # BLD AUTO: 0.2 10E3/UL (ref 0–0.7)
EOSINOPHIL # BLD MANUAL: 0.3 10E3/UL (ref 0–0.7)
EOSINOPHIL NFR BLD AUTO: 3 %
EOSINOPHIL NFR BLD MANUAL: 3 %
ERYTHROCYTE [DISTWIDTH] IN BLOOD BY AUTOMATED COUNT: 18.7 % (ref 10–15)
ERYTHROCYTE [DISTWIDTH] IN BLOOD BY AUTOMATED COUNT: 18.8 % (ref 10–15)
FIBRINOGEN PPP-MCNC: 744 MG/DL (ref 170–510)
GLUCOSE SERPL-MCNC: 115 MG/DL (ref 70–99)
GLUCOSE SERPL-MCNC: 135 MG/DL (ref 70–99)
HCO3 SERPL-SCNC: 23 MMOL/L (ref 22–29)
HCO3 SERPL-SCNC: 26 MMOL/L (ref 22–29)
HCT VFR BLD AUTO: 30.9 % (ref 35–47)
HCT VFR BLD AUTO: 31.7 % (ref 35–47)
HGB BLD-MCNC: 9.6 G/DL (ref 11.7–15.7)
HGB BLD-MCNC: 9.8 G/DL (ref 11.7–15.7)
IMM GRANULOCYTES # BLD: 0.1 10E3/UL
IMM GRANULOCYTES NFR BLD: 1 %
INR PPP: 1.11 (ref 0.85–1.15)
LDH SERPL L TO P-CCNC: 294 U/L (ref 0–250)
LDH SERPL L TO P-CCNC: 311 U/L (ref 0–250)
LYMPHOCYTES # BLD AUTO: 0.9 10E3/UL (ref 0.8–5.3)
LYMPHOCYTES # BLD MANUAL: 1.1 10E3/UL (ref 0.8–5.3)
LYMPHOCYTES NFR BLD AUTO: 11 %
LYMPHOCYTES NFR BLD MANUAL: 11 %
MAGNESIUM SERPL-MCNC: 2.5 MG/DL (ref 1.7–2.3)
MCH RBC QN AUTO: 29 PG (ref 26.5–33)
MCH RBC QN AUTO: 29.5 PG (ref 26.5–33)
MCHC RBC AUTO-ENTMCNC: 30.9 G/DL (ref 31.5–36.5)
MCHC RBC AUTO-ENTMCNC: 31.1 G/DL (ref 31.5–36.5)
MCV RBC AUTO: 93 FL (ref 78–100)
MCV RBC AUTO: 96 FL (ref 78–100)
MONOCYTES # BLD AUTO: 1.9 10E3/UL (ref 0–1.3)
MONOCYTES # BLD MANUAL: 1.3 10E3/UL (ref 0–1.3)
MONOCYTES NFR BLD AUTO: 23 %
MONOCYTES NFR BLD MANUAL: 14 %
MYELOCYTES # BLD MANUAL: 0.1 10E3/UL
MYELOCYTES NFR BLD MANUAL: 1 %
NEUTROPHILS # BLD AUTO: 5.1 10E3/UL (ref 1.6–8.3)
NEUTROPHILS # BLD MANUAL: 6.6 10E3/UL (ref 1.6–8.3)
NEUTROPHILS NFR BLD AUTO: 62 %
NEUTROPHILS NFR BLD MANUAL: 69 %
NRBC # BLD AUTO: 0.1 10E3/UL
NRBC BLD AUTO-RTO: 1 /100
PHOSPHATE SERPL-MCNC: 4 MG/DL (ref 2.5–4.5)
PHOSPHATE SERPL-MCNC: 4.4 MG/DL (ref 2.5–4.5)
PLAT MORPH BLD: ABNORMAL
PLAT MORPH BLD: ABNORMAL
PLATELET # BLD AUTO: 311 10E3/UL (ref 150–450)
PLATELET # BLD AUTO: 313 10E3/UL (ref 150–450)
POLYCHROMASIA BLD QL SMEAR: ABNORMAL
POLYCHROMASIA BLD QL SMEAR: SLIGHT
POTASSIUM SERPL-SCNC: 4 MMOL/L (ref 3.4–5.3)
POTASSIUM SERPL-SCNC: 4.1 MMOL/L (ref 3.4–5.3)
PROT SERPL-MCNC: 7.1 G/DL (ref 6.4–8.3)
PROT SERPL-MCNC: 7.1 G/DL (ref 6.4–8.3)
PROTHROMBIN TIME: 14.6 SECONDS (ref 11.8–14.8)
RBC # BLD AUTO: 3.31 10E6/UL (ref 3.8–5.2)
RBC # BLD AUTO: 3.32 10E6/UL (ref 3.8–5.2)
RBC MORPH BLD: ABNORMAL
RBC MORPH BLD: ABNORMAL
SODIUM SERPL-SCNC: 139 MMOL/L (ref 135–145)
SODIUM SERPL-SCNC: 140 MMOL/L (ref 135–145)
URATE SERPL-MCNC: 5.2 MG/DL (ref 2.4–5.7)
URATE SERPL-MCNC: 5.4 MG/DL (ref 2.4–5.7)
WBC # BLD AUTO: 8.4 10E3/UL (ref 4–11)
WBC # BLD AUTO: 9.6 10E3/UL (ref 4–11)

## 2025-05-06 PROCEDURE — 99223 1ST HOSP IP/OBS HIGH 75: CPT | Mod: AI | Performed by: STUDENT IN AN ORGANIZED HEALTH CARE EDUCATION/TRAINING PROGRAM

## 2025-05-06 PROCEDURE — 84450 TRANSFERASE (AST) (SGOT): CPT | Performed by: STUDENT IN AN ORGANIZED HEALTH CARE EDUCATION/TRAINING PROGRAM

## 2025-05-06 PROCEDURE — 85610 PROTHROMBIN TIME: CPT

## 2025-05-06 PROCEDURE — 85025 COMPLETE CBC W/AUTO DIFF WBC: CPT

## 2025-05-06 PROCEDURE — 93005 ELECTROCARDIOGRAM TRACING: CPT

## 2025-05-06 PROCEDURE — 93010 ELECTROCARDIOGRAM REPORT: CPT | Performed by: INTERNAL MEDICINE

## 2025-05-06 PROCEDURE — 36415 COLL VENOUS BLD VENIPUNCTURE: CPT | Performed by: STUDENT IN AN ORGANIZED HEALTH CARE EDUCATION/TRAINING PROGRAM

## 2025-05-06 PROCEDURE — 84550 ASSAY OF BLOOD/URIC ACID: CPT

## 2025-05-06 PROCEDURE — 250N000012 HC RX MED GY IP 250 OP 636 PS 637: Performed by: STUDENT IN AN ORGANIZED HEALTH CARE EDUCATION/TRAINING PROGRAM

## 2025-05-06 PROCEDURE — 84550 ASSAY OF BLOOD/URIC ACID: CPT | Performed by: STUDENT IN AN ORGANIZED HEALTH CARE EDUCATION/TRAINING PROGRAM

## 2025-05-06 PROCEDURE — 99418 PROLNG IP/OBS E/M EA 15 MIN: CPT | Performed by: STUDENT IN AN ORGANIZED HEALTH CARE EDUCATION/TRAINING PROGRAM

## 2025-05-06 PROCEDURE — 85007 BL SMEAR W/DIFF WBC COUNT: CPT

## 2025-05-06 PROCEDURE — 250N000011 HC RX IP 250 OP 636: Performed by: PHYSICIAN ASSISTANT

## 2025-05-06 PROCEDURE — 36569 INSJ PICC 5 YR+ W/O IMAGING: CPT

## 2025-05-06 PROCEDURE — 272N000451 HC KIT SHRLOCK 5FR POWER PICC DOUBLE LUMEN

## 2025-05-06 PROCEDURE — 85730 THROMBOPLASTIN TIME PARTIAL: CPT

## 2025-05-06 PROCEDURE — 84100 ASSAY OF PHOSPHORUS: CPT | Performed by: STUDENT IN AN ORGANIZED HEALTH CARE EDUCATION/TRAINING PROGRAM

## 2025-05-06 PROCEDURE — 258N000003 HC RX IP 258 OP 636: Performed by: STUDENT IN AN ORGANIZED HEALTH CARE EDUCATION/TRAINING PROGRAM

## 2025-05-06 PROCEDURE — 250N000011 HC RX IP 250 OP 636

## 2025-05-06 PROCEDURE — 36415 COLL VENOUS BLD VENIPUNCTURE: CPT

## 2025-05-06 PROCEDURE — 82040 ASSAY OF SERUM ALBUMIN: CPT

## 2025-05-06 PROCEDURE — 83735 ASSAY OF MAGNESIUM: CPT

## 2025-05-06 PROCEDURE — 250N000009 HC RX 250: Performed by: STUDENT IN AN ORGANIZED HEALTH CARE EDUCATION/TRAINING PROGRAM

## 2025-05-06 PROCEDURE — 3E04305 INTRODUCTION OF OTHER ANTINEOPLASTIC INTO CENTRAL VEIN, PERCUTANEOUS APPROACH: ICD-10-PCS | Performed by: INTERNAL MEDICINE

## 2025-05-06 PROCEDURE — 120N000002 HC R&B MED SURG/OB UMMC

## 2025-05-06 PROCEDURE — 250N000011 HC RX IP 250 OP 636: Performed by: STUDENT IN AN ORGANIZED HEALTH CARE EDUCATION/TRAINING PROGRAM

## 2025-05-06 PROCEDURE — 250N000013 HC RX MED GY IP 250 OP 250 PS 637

## 2025-05-06 PROCEDURE — 83615 LACTATE (LD) (LDH) ENZYME: CPT

## 2025-05-06 PROCEDURE — 84100 ASSAY OF PHOSPHORUS: CPT

## 2025-05-06 PROCEDURE — 85384 FIBRINOGEN ACTIVITY: CPT

## 2025-05-06 PROCEDURE — 83615 LACTATE (LD) (LDH) ENZYME: CPT | Performed by: STUDENT IN AN ORGANIZED HEALTH CARE EDUCATION/TRAINING PROGRAM

## 2025-05-06 PROCEDURE — 250N000009 HC RX 250: Performed by: PHYSICIAN ASSISTANT

## 2025-05-06 PROCEDURE — 85041 AUTOMATED RBC COUNT: CPT | Performed by: STUDENT IN AN ORGANIZED HEALTH CARE EDUCATION/TRAINING PROGRAM

## 2025-05-06 RX ORDER — SUCRALFATE ORAL 1 G/10ML
1 SUSPENSION ORAL
Status: DISPENSED | OUTPATIENT
Start: 2025-05-06

## 2025-05-06 RX ORDER — ALBUTEROL SULFATE 90 UG/1
1-2 INHALANT RESPIRATORY (INHALATION) EVERY 4 HOURS PRN
Status: ACTIVE | OUTPATIENT
Start: 2025-05-06

## 2025-05-06 RX ORDER — PROCHLORPERAZINE MALEATE 5 MG/1
10 TABLET ORAL EVERY 6 HOURS PRN
Status: DISCONTINUED | OUTPATIENT
Start: 2025-05-06 | End: 2025-05-06

## 2025-05-06 RX ORDER — LORATADINE 10 MG/1
10 TABLET ORAL DAILY
Status: DISPENSED | OUTPATIENT
Start: 2025-05-07

## 2025-05-06 RX ORDER — DIPHENHYDRAMINE HYDROCHLORIDE 50 MG/ML
50 INJECTION, SOLUTION INTRAMUSCULAR; INTRAVENOUS
Status: ACTIVE | OUTPATIENT
Start: 2025-05-06

## 2025-05-06 RX ORDER — HEPARIN SODIUM,PORCINE 10 UNIT/ML
5-15 VIAL (ML) INTRAVENOUS EVERY 24 HOURS
Status: DISPENSED | OUTPATIENT
Start: 2025-05-06

## 2025-05-06 RX ORDER — AMOXICILLIN 250 MG
1 CAPSULE ORAL 2 TIMES DAILY PRN
Status: ACTIVE | OUTPATIENT
Start: 2025-05-06

## 2025-05-06 RX ORDER — ENOXAPARIN SODIUM 100 MG/ML
40 INJECTION SUBCUTANEOUS EVERY 24 HOURS
Status: DISPENSED | OUTPATIENT
Start: 2025-05-06

## 2025-05-06 RX ORDER — HEPARIN SODIUM,PORCINE 10 UNIT/ML
5-15 VIAL (ML) INTRAVENOUS
Status: DISPENSED | OUTPATIENT
Start: 2025-05-06

## 2025-05-06 RX ORDER — ACYCLOVIR 400 MG/1
400 TABLET ORAL 2 TIMES DAILY
Status: DISPENSED | OUTPATIENT
Start: 2025-05-06

## 2025-05-06 RX ORDER — MEPERIDINE HYDROCHLORIDE 25 MG/ML
25 INJECTION INTRAMUSCULAR; INTRAVENOUS; SUBCUTANEOUS
Status: ACTIVE | OUTPATIENT
Start: 2025-05-06

## 2025-05-06 RX ORDER — DIPHENHYDRAMINE HYDROCHLORIDE 50 MG/ML
25 INJECTION, SOLUTION INTRAMUSCULAR; INTRAVENOUS
Status: ACTIVE | OUTPATIENT
Start: 2025-05-06

## 2025-05-06 RX ORDER — ONDANSETRON 8 MG/1
8 TABLET, FILM COATED ORAL EVERY 12 HOURS
Status: DISPENSED | OUTPATIENT
Start: 2025-05-06 | End: 2025-05-11

## 2025-05-06 RX ORDER — MULTIVITAMIN WITH IRON
500 TABLET ORAL DAILY
Status: DISPENSED | OUTPATIENT
Start: 2025-05-07

## 2025-05-06 RX ORDER — ONDANSETRON 8 MG/1
8 TABLET, FILM COATED ORAL EVERY 8 HOURS PRN
Status: DISCONTINUED | OUTPATIENT
Start: 2025-05-06 | End: 2025-05-06

## 2025-05-06 RX ORDER — CYCLOBENZAPRINE HCL 5 MG
10 TABLET ORAL 3 TIMES DAILY PRN
Status: ACTIVE | OUTPATIENT
Start: 2025-05-06

## 2025-05-06 RX ORDER — FAMOTIDINE 20 MG/1
20 TABLET, FILM COATED ORAL 2 TIMES DAILY
Status: DISPENSED | OUTPATIENT
Start: 2025-05-06

## 2025-05-06 RX ORDER — HYDROXYZINE HYDROCHLORIDE 25 MG/1
25-50 TABLET, FILM COATED ORAL EVERY 6 HOURS PRN
Status: ACTIVE | OUTPATIENT
Start: 2025-05-06

## 2025-05-06 RX ORDER — VITAMIN B COMPLEX
1000 TABLET ORAL DAILY
Status: DISPENSED | OUTPATIENT
Start: 2025-05-07

## 2025-05-06 RX ORDER — EPINEPHRINE 1 MG/ML
0.3 INJECTION, SOLUTION, CONCENTRATE INTRAVENOUS EVERY 5 MIN PRN
Status: ACTIVE | OUTPATIENT
Start: 2025-05-06

## 2025-05-06 RX ORDER — LIDOCAINE 40 MG/G
CREAM TOPICAL
Status: ACTIVE | OUTPATIENT
Start: 2025-05-06 | End: 2025-05-09

## 2025-05-06 RX ORDER — LORAZEPAM 0.5 MG/1
.5-1 TABLET ORAL EVERY 6 HOURS PRN
Status: ACTIVE | OUTPATIENT
Start: 2025-05-06

## 2025-05-06 RX ORDER — SUMATRIPTAN 50 MG/1
50 TABLET, FILM COATED ORAL
Status: ACTIVE | OUTPATIENT
Start: 2025-05-06

## 2025-05-06 RX ORDER — ONDANSETRON 2 MG/ML
8 INJECTION INTRAMUSCULAR; INTRAVENOUS EVERY 8 HOURS PRN
Status: DISCONTINUED | OUTPATIENT
Start: 2025-05-06 | End: 2025-05-06

## 2025-05-06 RX ORDER — POLYETHYLENE GLYCOL 3350 17 G/17G
17 POWDER, FOR SOLUTION ORAL 2 TIMES DAILY PRN
Status: ACTIVE | OUTPATIENT
Start: 2025-05-06

## 2025-05-06 RX ORDER — IPRATROPIUM BROMIDE AND ALBUTEROL SULFATE 2.5; .5 MG/3ML; MG/3ML
1 SOLUTION RESPIRATORY (INHALATION) EVERY 4 HOURS PRN
Status: ACTIVE | OUTPATIENT
Start: 2025-05-06

## 2025-05-06 RX ORDER — PROCHLORPERAZINE MALEATE 5 MG/1
5 TABLET ORAL EVERY 6 HOURS PRN
Status: ACTIVE | OUTPATIENT
Start: 2025-05-06

## 2025-05-06 RX ORDER — ONDANSETRON 4 MG/1
8 TABLET, ORALLY DISINTEGRATING ORAL EVERY 8 HOURS PRN
Status: DISCONTINUED | OUTPATIENT
Start: 2025-05-06 | End: 2025-05-06

## 2025-05-06 RX ORDER — POSACONAZOLE 100 MG/1
300 TABLET, DELAYED RELEASE ORAL EVERY MORNING
Status: DISPENSED | OUTPATIENT
Start: 2025-05-07

## 2025-05-06 RX ORDER — PANTOPRAZOLE SODIUM 40 MG/1
40 TABLET, DELAYED RELEASE ORAL
Status: DISPENSED | OUTPATIENT
Start: 2025-05-06

## 2025-05-06 RX ORDER — METHYLPREDNISOLONE SODIUM SUCCINATE 40 MG/ML
40 INJECTION INTRAMUSCULAR; INTRAVENOUS
Status: ACTIVE | OUTPATIENT
Start: 2025-05-06

## 2025-05-06 RX ORDER — ALBUTEROL SULFATE 0.83 MG/ML
2.5 SOLUTION RESPIRATORY (INHALATION)
Status: ACTIVE | OUTPATIENT
Start: 2025-05-06

## 2025-05-06 RX ORDER — PREDNISOLONE ACETATE 10 MG/ML
2 SUSPENSION/ DROPS OPHTHALMIC 4 TIMES DAILY
Status: DISPENSED | OUTPATIENT
Start: 2025-05-06 | End: 2025-05-12

## 2025-05-06 RX ORDER — DEXAMETHASONE 4 MG/1
4 TABLET ORAL EVERY 12 HOURS
Status: DISPENSED | OUTPATIENT
Start: 2025-05-06 | End: 2025-05-09

## 2025-05-06 RX ORDER — ACETAMINOPHEN 325 MG/1
650 TABLET ORAL EVERY 4 HOURS PRN
Status: DISPENSED | OUTPATIENT
Start: 2025-05-06

## 2025-05-06 RX ORDER — PAROXETINE 20 MG/1
20 TABLET, FILM COATED ORAL EVERY MORNING
Status: DISPENSED | OUTPATIENT
Start: 2025-05-07

## 2025-05-06 RX ORDER — LORAZEPAM 2 MG/ML
.5-1 INJECTION INTRAMUSCULAR EVERY 6 HOURS PRN
Status: ACTIVE | OUTPATIENT
Start: 2025-05-06

## 2025-05-06 RX ORDER — ALBUTEROL SULFATE 90 UG/1
1-2 INHALANT RESPIRATORY (INHALATION)
Status: ACTIVE | OUTPATIENT
Start: 2025-05-06

## 2025-05-06 RX ORDER — FLUTICASONE FUROATE AND VILANTEROL 100; 25 UG/1; UG/1
1 POWDER RESPIRATORY (INHALATION) DAILY
Status: DISPENSED | OUTPATIENT
Start: 2025-05-07

## 2025-05-06 RX ORDER — AMOXICILLIN 250 MG
2 CAPSULE ORAL 2 TIMES DAILY PRN
Status: ACTIVE | OUTPATIENT
Start: 2025-05-06

## 2025-05-06 RX ORDER — CALCIUM CARBONATE 500 MG/1
500 TABLET, CHEWABLE ORAL 3 TIMES DAILY PRN
Status: DISPENSED | OUTPATIENT
Start: 2025-05-06

## 2025-05-06 RX ADMIN — SUCRALFATE 1 G: 1 SUSPENSION ORAL at 17:37

## 2025-05-06 RX ADMIN — DEXAMETHASONE 4 MG: 4 TABLET ORAL at 19:41

## 2025-05-06 RX ADMIN — ENOXAPARIN SODIUM 40 MG: 40 INJECTION SUBCUTANEOUS at 19:41

## 2025-05-06 RX ADMIN — SUCRALFATE 1 G: 1 SUSPENSION ORAL at 21:51

## 2025-05-06 RX ADMIN — PREDNISOLONE ACETATE 2 DROP: 10 SUSPENSION/ DROPS OPHTHALMIC at 19:44

## 2025-05-06 RX ADMIN — ONDANSETRON HYDROCHLORIDE 8 MG: 8 TABLET, FILM COATED ORAL at 19:41

## 2025-05-06 RX ADMIN — ACETAMINOPHEN 650 MG: 325 TABLET ORAL at 19:44

## 2025-05-06 RX ADMIN — GABAPENTIN 400 MG: 300 CAPSULE ORAL at 17:37

## 2025-05-06 RX ADMIN — FAMOTIDINE 20 MG: 20 TABLET, FILM COATED ORAL at 19:41

## 2025-05-06 RX ADMIN — ACYCLOVIR 400 MG: 400 TABLET ORAL at 19:41

## 2025-05-06 RX ADMIN — CYTARABINE 5730 MG: 100 INJECTION, SOLUTION INTRATHECAL; INTRAVENOUS; SUBCUTANEOUS at 20:34

## 2025-05-06 RX ADMIN — Medication 10 ML: at 17:37

## 2025-05-06 RX ADMIN — LIDOCAINE HYDROCHLORIDE ANHYDROUS 1 ML: 10 INJECTION, SOLUTION INFILTRATION at 15:46

## 2025-05-06 RX ADMIN — PANTOPRAZOLE SODIUM 40 MG: 40 TABLET, DELAYED RELEASE ORAL at 17:36

## 2025-05-06 RX ADMIN — GABAPENTIN 400 MG: 300 CAPSULE ORAL at 19:40

## 2025-05-06 ASSESSMENT — ACTIVITIES OF DAILY LIVING (ADL)
ADLS_ACUITY_SCORE: 31
VISION_MANAGEMENT: GLASSES
ADLS_ACUITY_SCORE: 33
DOING_ERRANDS_INDEPENDENTLY_DIFFICULTY: NO
WEAR_GLASSES_OR_BLIND: YES
CHANGE_IN_FUNCTIONAL_STATUS_SINCE_ONSET_OF_CURRENT_ILLNESS/INJURY: NO
FALL_HISTORY_WITHIN_LAST_SIX_MONTHS: NO
ADLS_ACUITY_SCORE: 33
DIFFICULTY_EATING/SWALLOWING: NO
ADLS_ACUITY_SCORE: 57
ADLS_ACUITY_SCORE: 33
WALKING_OR_CLIMBING_STAIRS_DIFFICULTY: NO
HEARING_DIFFICULTY_OR_DEAF: NO
CONCENTRATING,_REMEMBERING_OR_MAKING_DECISIONS_DIFFICULTY: NO
ADLS_ACUITY_SCORE: 57
TOILETING_ISSUES: NO
DRESSING/BATHING_DIFFICULTY: NO
ADLS_ACUITY_SCORE: 33
DIFFICULTY_COMMUNICATING: NO
ADLS_ACUITY_SCORE: 31

## 2025-05-06 NOTE — PHARMACY-ADMISSION MEDICATION HISTORY
Pharmacist Admission Medication History    Admission medication history is complete. The information provided in this note is only as accurate as the sources available at the time of the update.    Information Source(s): Spoke with patient; Reviewed medication dispense history (Sure Scripts); Reviewed recent admissions     Changes made to PTA medication list:  Added: None  Deleted: Cefdinir (course completed); Pred eye drops (course completed)   Changed: None    Pertinent information: Recent hospitalization at [facility]. Completed course of cefdinir and azithromycin. Patient requested refills of her medications.     PTA Med List   Medication Sig Last Dose/Taking    acyclovir (ZOVIRAX) 400 MG tablet Take 1 tablet (400 mg) by mouth 2 times daily. 5/6/2025 Morning    budesonide-formoterol (SYMBICORT) 80-4.5 MCG/ACT Inhaler Inhale 2 puffs into the lungs 2 times daily - Rinse mouth well after use to prevent Thrush 5/6/2025 Morning    famotidine (PEPCID) 20 MG tablet Take 1 tablet (20 mg) by mouth 2 times daily. 5/6/2025 Morning    gabapentin (NEURONTIN) 400 MG capsule Take 1 capsule (400 mg) by mouth 3 times daily. 5/6/2025 Morning    pantoprazole (PROTONIX) 40 MG EC tablet Take 1 tablet (40 mg) by mouth 2 times daily (before meals). 5/6/2025 Morning    PARoxetine (PAXIL) 20 MG tablet TAKE 1 TABLET (20 MG) BY MOUTH EVERY MORNING 5/6/2025 Morning    potassium chloride ER (K-TAB) 20 MEQ CR tablet Take 1 tablet (20 mEq) by mouth daily. 5/6/2025 Morning    rosuvastatin (CRESTOR) 20 MG tablet Take 1 tablet (20 mg) by mouth daily. Past Week    sucralfate (CARAFATE) 1 GM/10ML suspension Take 10 mLs (1 g) by mouth 4 times daily (before meals and nightly). 5/6/2025 Morning    Vitamin D, Cholecalciferol, 25 MCG (1000 UT) CAPS Take 1,000 Units by mouth daily. 5/6/2025 Morning     Medication History Completed By: Topher Almanzar [facility] 5/6/2025 2:13 PM

## 2025-05-06 NOTE — PROVIDER NOTIFICATION
05/06/25 1651   PICC 05/06/25 Double Lumen Right Basilic Chemotherapy   Placement Date/Time: 05/06/25 (c) 1651   Lumens (Required): Double Lumen  Lumen Identification: Purple;Red  Catheter Brand: VoloMedia  Catheter Size: 5 fr  Lot #: [id number]  Full barrier precautions done: Yes, hand hygiene, sterile gown, sterile gloves, mas...   Site Assessment WDL   External Cath Length (cm) (S)  3 cm   Extremity Circumference (cm) 28 cm   Dressing Transparent;Securement device;Silver disk   Dressing Status clean;dry;intact   Dressing Change Due (S)  05/13/25   Line Necessity Yes, meets criteria   Purple - Status blood return noted;saline locked   Purple - Cap Change Due 05/10/25   Purple - Intervention Flushed   Red - Status blood return noted;saline locked   Red - Cap Change Due 05/10/25   Red - Intervention Flushed   Phlebitis Scale 0-->no symptoms   Infiltration? no   PICC Comment PICC is OK to use

## 2025-05-06 NOTE — PLAN OF CARE
"Goal Outcome Evaluation:      Plan of Care Reviewed With: patient    Overall Patient Progress: no changeOverall Patient Progress: no change    Outcome Evaluation: Chemotherapy admit    [id number]:    Nursing Focus: Admission  D: Arrived at 1230 from home via private transportation. Patient accompanied by her sister. Admitted for admitted 5/6/2025 for C2 consolidation with [facility].     I: Admission process began.  Patient oriented to room, enviroment, call light.  MD notified of patient's arrival on unit.     A: Vital signs stable, afebrile.  Patient stable at this time.  Alejandra said she had been coughing so hard r/t pneumonia, that a bump on the back of lower head developed and had experience pain behind right ear.  Asked to have a Dr look/check out her ear.  Heme Malignancy PA-C aware.  Denies pain, nausea, vomiting, chest pain and SOB.  Chemotherapy protocol doubled checked by two competent RNs.  Two RN skin check still needs to be done.  Up ad vanda, ambulated outside to smoke.  Pt declined nicotine patch said it \"causes chest pain\" and nicotine gum.  Labs collected and EKG done.  PICC being placed in pt's room by Vas Acc RN during zabala of shift.    P: Implement plan of care when available. Continue to monitor patient. Nursing interventions as appropriate. Notify MD with changes in pt status.   "

## 2025-05-06 NOTE — PROGRESS NOTES
Vascular Access Services Notes:     PICC/Midline to be placed today as ordered. Primary RN to assure that patient have had Chlorhexidine Gluconate (CHG) bath & linen change prior to procedure. RN to contact VAS once done.         Gustavo Torrez, JOHNN, RN [facility]-BC  Vascular Access Services  [facility]  [phone]

## 2025-05-06 NOTE — PROCEDURES
[facility]    Double Lumen PICC Placement    Date/Time: 5/6/2025 4:51 PM    Performed by: Gustavo Torrez RN  Authorized by: Laci Kerns PA-C  Indications: Chemotherapy.      UNIVERSAL PROTOCOL   Site Marked: Yes  Prior Images Obtained and Reviewed:  Yes  Required items: Required blood products, implants, devices and special equipment available    Patient identity confirmed:  Verbally with patient, hospital-assigned identification number, arm band and provided demographic data  Patient was reevaluated immediately before administering moderate or deep sedation or anesthesia  Confirmation Checklist:  Patient's identity using two indicators, procedure was appropriate and matched the consent or emergent situation, correct equipment/implants were available and relevant allergies  Time out: Immediately prior to the procedure a time out was called    Universal Protocol: the [facility] Universal Protocol was followed    Preparation: Patient was prepped and draped in usual sterile fashion       ANESTHESIA    Anesthesia:  See MAR for details  Local Anesthetic:  Lidocaine 1% without epinephrine  Anesthetic Total (mL):  1      SEDATION    Patient Sedated: No        Preparation: skin prepped with povidone-iodine and skin prepped with alcohol  Skin prep agent: skin prep agent completely dried prior to procedure  Sterile barriers: maximum sterile barriers were used: cap, mask, sterile gown, sterile gloves, and large sterile sheet  Hand hygiene: hand hygiene performed prior to central venous catheter insertion  Type of line used: PICC  Catheter type: double lumen  Lumen type: non-valved and power PICC  Lumen Identification: Red and Purple  Catheter size: 5 Fr  Brand: Bard  Lot number: [id number]  Placement method: venipuncture, MST, ultrasound and tip navigation system  Number of attempts: 1  Difficulty threading catheter: no  Successful placement: yes  Orientation:  right  Catheter to Vein (%): 36  Location: basilic vein  Tip Location: SVC  Arm circumference: adults 10 cm  Extremity circumference: 28  Visible catheter length: 3  Total catheter length: 44  Dressing and securement: alcohol impregnated caps, transparent dressing, sterile dressing applied, statlock and site cleansed (Algidex)  Post procedure assessment: blood return through all ports, free fluid flow and placement verified by 3CG technology  PROCEDURE Describe Procedure: Patient tolerated well and denied pain immediately after procedure.  PICC tip is in satisfactory location as verified by [employer] 3CG Tip Confirmation System. PICC is OK to use.  Disposal: sharps and needle count correct at the end of procedure, needles and guidewire disposed in sharps container  Patient Tolerance:  Patient tolerated the procedure well with no immediate complications

## 2025-05-06 NOTE — H&P
[facility]    History and Physical  Hematology / Oncology     Date of Admission:  05/06/25  Date of Service (when I saw the patient): 05/06/25    Assessment & Plan   Alejandra Villegas is a 59 year old female with a past medical history significant for COPD, migraines, rheumatoid arthritis, aortic stenosis, Afib, and anxiety who presented to an outside hospital with cytopenias and was found on BMBx to have hypercellular marrow with 4% blasts, 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 She was subsequently admitted to [facility] on 2/26/25 for further work-up and management and pathology re-review was most consistent with AML with NPM1 mutation by WHO 2022 (which does not require a specific blast threshold). Following further multidisciplinary discussion, she started intensive induction with 7+3 (D1=3/5/25). Her course was complicated by recurrent neutropenic fevers (4 discrete episodes), for which she was treated with broad-spectrum IV antibiotics. Her induction was also notable for severe epigastric pain and reflux symptoms for which GI was consulted. She was found on imaging to have esophagitis and a hiatal hernia, and her symptoms improved with optimization of her GERD therapy and lifestyle modifications as detailed below. With regards to her AML, she tolerated chemotherapy overall very well, and her midpoint bone marrow biopsy showed relative aplasia; subsequently, a repeat biopsy at count recovery demonstrated remission.  Her counts recovered robustly, and she was noted to have marked thrombocytosis, thought reactive/due to marrow overshoot. She then directly proceeded with the first cycle of consolidation with HiDAC (C1D1=4/3/25), which she tolerated very well. She is being admitted 5/6/2025 for C2 consolidation with HiDAC.    Today:  - C2D1 HiDAC consolidation today  - EKG ordered  - Chemo orders signed by Dr. Nemesio ROBERSON  # AML with NPM1  mutation  Had been seen by PCP Dec 2024 w/ progressive fatigue and nausea where she was found leukopenic WBC  2.4 and neutropenic w/ ANC 0.3. Hgb 12. Was referred to local hematology/oncology clinic for further evaluation and seen by Dr. Samina Harris. BMBx 2/10/25 done at OSH showed hypercellular marrow w/ trilineage hematopoiesis w/ dyspoiesis with mildly elevated blasts of 4% on morphology. NGS w/ NPM1, IDH2, and NRAS mutations. She was admitted to [facility] 2/26/25 for further workup and management. Slides were re-reviewed by [facility] Hemat[facility], who noted hypercellular marrow with 8% blasts by morphology. Despite relatively low blast percentage, findings were felt most consistent with a diagnosis AML with NPM1 mutation by WHO 2022 (which does not require a specific blast threshold). Following interdepartmental discussions and review of the available literature, patient was started on intensive induction with 7+3 (Day 1=3/5/25). Midcycle BMBx 3/19/25 with no morphologic or immunophenotypic evidence of AML. D28 BMBx completed 3/31/25 with hypercellular marrow (60 to 70%) with no overt dysplasia or increase in blasts, noted flow with 4.3% blasts of unusual phenotype. Due to overall disposition timeline as well as patient living in [facility], preceded directly with consolidation chemotherapy with HiDAC (C1D1=4/3/25) which she tolerated well. Now, proceeding with C2 HiDAC (C2D1 on 5/6/2025).  - PICC placed 5/6/2025, plan to remove prior to discharge  - Baseline cardiopulmonary studies:  - Echo w/ EF 60-65%, mild known aortic stenosis.  - EKG (2/27) with NSR, QTc 480  - CXR (4/27) Small area of new infiltrate versus atelectasis left lung base. No consolidation. R lung clear.  - Baseline viral serologies: CMV IgG+, EBV IgG+, HepB sAg-, HepB cAb-, HepB sAb-, HSV1+/2+, HIV-  - HLA Typing sent on 3/3/2025 and 4/25/2025. BMT Consult on 4/16, no immediate plans for bone marrow transplant.     Treatment plan: HiDAC  (C2D1= 5/6/2025)   - Cytarabine 3 g/m  IV q12h D1-3   - Neulasta 6 mg D5 - to be administered at [facility]  Supportive medications: Zofran 8 mg q12h, Dexamethasone 4 mg q12h D1-3, Prednisolone eye drops QID D1-5     # Pancytopenia due to chemotherapy, resolved  Secondary to underlying hematologic malignancy and exacerbated by recent chemotherapy. Recovered   - Follow daily CBC  - Transfuse to keep Hgb >7, plt >10K     ID   # ID prophylaxis  - Acyclovir 400 mg BID  - Levaquin 250 mg daily when ANC <1.0; ANC at admission 6.3  - Posaconazole 300 mg daily - resume on discharge  - Note: Posaconazole copay $1.60. Trialed initially on vori, then rotated to posa x3/18 given visual hallucinations     GI  # GERD, improved  # Epigastric pain, resolved  Reported recent increase in symptoms in the days leading up to her previous admission for C1 of consolidation and initiated famotidine; she had persistent/progressive symptoms throughout that admission. CT C/A/P 4/14/2025 with evidence of esophagitis (thought due to reflux/recent chemotherapy) and small hiatal hernia. Symptoms have historically improved on maximal medical therapy as below.  - Continue famotidine 20 mg BID  - Continue Protonix 40 mg BID  - GI cocktail PRN  - TUMS PRN  - GI consulted, EGD deferred for now as risks thought to outweigh the benefits in the setting of neutropenia; can reassess upon count recovery  - Continue to encourage lifestyle modifications: avoid straws, carbonated beverages, GERD precautions    # Nausea/vomiting, improved  Patient noted ongoing nausea w/ occasional vomiting starting Dec 2024. Has been managed with PRN Zofran. Now resolved.  - PRN Zofran and compazine     PULM  # COPD  Longstanding history of COPD. On admission patient reports symptoms are manageable with no recent exacerbations. Most recent PFTs (2018) were normal.  - Continue PTA Breo Ellipta inhaler and PRN DuoNebs   - Encourage smoking cessation     CV  # Essential  hypertension  - PTA lisinopril 10 mg daily has been HELD     # Infrarenal abdominal aortic aneurysm  Noted incidentally on CT C/A/P (3/19/25), measuring 3.4 cm in diameter.  - Attention on follow-up imaging     RENAL/FEN  # Stage 2 CKD  Baseline Cr ~ 0.7. On admission Cr 0.76  - Continue to trend on daily labs and encourage PO hydration     NEURO  # Chronic migraine w/o aura  # Chronic headaches  Present since childhood. Reportedly triggered by neck manipulation/movement. Reportedly well-managed with regimen below. Headaches occurring throughout admission with daily APAP use, also chronic and managed with APAP PRN at home. Patient notes that her headaches throughout prior hospitalizations have been consistent with her typical daily headaches with no reported changes in character, quality, location, severity, or frequency. She denies new associated neurological changes. May consider LP to exclude CNS leukemia, but given this reassuring history and absence of other indications for LP (no hyperleukocytosis, no monocytic phenotype, no FLT3 mutation, etc), this was historically deferred.  - APAP PRN  - Continue PTA sumatriptan and cyclobenzaprine PRN  - Could reconsider need for LP if headache worsens/changes in character or quality in the future     MISC   # BROOKS  # MDD  # At risk for adjustment disorder  Patient reports good control of anxiety/depressive symptoms prior to admission. Did note feeling overwhelmed by new diagnosis. Offered health psychology or cordelia, which she declined.  - Readdress health psych consult as indicated  - Continue PTA paroxetine   - Atarax as needed     # Tobacco use disorder  Has smoked since age 16, 1.5 ppd (roughly 65 pack years). Attempting to cut back as recently as 01/2025 to 0.5 ppd. We discussed the risks of smoking during induction chemotherapy including increased risk for infection in setting of severe neutropenia that could further complicate course, placing her at risk for  severe complications that could be life-threatening or even fatal.  - Encourage smoking cessation; patient continues to smoke at this time, despite understanding/acknowledgement of the risks. Trying to cut down, however.   - Trialed nicotine patch, then self-discontinued after reporting that she felt the nicotine patch precipitated an anxiety attack/episode of chest pain on 3/9. Has been offered a lower dose versus alternative form of NRT, which patient declined.  - Would continue posaconazole ppx, regardless of ANC, given high risk for fungal pulmonary infection in the setting of ongoing tobacco abuse.     # Degenerative disc disease  Appears likely multi-level; no MRI available for review, though note that patient has previously had epidural injections at L3-4 and L5-S1 (2016). Reported taking gabapentin 400 mg TID PRN prior to admission. Mild exacerbation noted 3/29; improved with resumption of gabapentin.  - Continue gabapentin 400 mg TID     Chronic Problems:  # Vitamin D deficiency - Continue PTA vit D supplement  # Seasonal allergies - Claritin 10 mg daily  # Rheumatoid arthritis - Patient reported trying treatment though was unable to tolerate well. Managed with Tylenol PRN. Most recent RF >650 (2/10/25). JI negative.    # Prediabetes - Last A1c 6.0 x12/9/24. Has been managing with lifestyle modifications.   # Mixed hyperlipidemia - Lipid panel has remained at goal, 1/14/25 panel w/ cholesterol 163, , LDL 92, HDL 45. - Held PTA atorvastatin on admission  # H/o aortic stenosis - Patient noted on admission longstanding history of aortic stenosis. Pre-chemo echo w/ EF 60-65% and mild aortic stenosis and insufficiency. No previous echo to compare.    Chronic/MISC  FEN   - IVF per chemotherapy regimen, IVF bolus prn   - PRN lyte replacement per standing protocol  - Regular diet as tolerated     Lines/Drains:   DVT ppx: Lovenox, hold if Plt <50K   GI ppx: PTA Pepcid, Protonix  Consults: TBD  CODE:  "DNR/DNI  DISPO: Discharge to home pending tolerance of consolidation chemotherapy. Anticipated discharge 5/9.   Follow-up/Referrals: TBD. Primary oncologist is Dr. Harris; patient has historically gone to [facility] for twice weekly labs/transfusions and Neulasta.    Medically Ready for Discharge: Anticipated in 2-4 Days       Code Status   FULL CODE; confirmed on admission 5/6/2025.      Patient and plan of care was discussed with attending physician Dr. Herr.    90 MINUTES SPENT BY ME on the date of service doing chart review, history, exam, documentation & further activities per the note.     Laci Kerns PA-C  Hematology/Oncology  Pager: #[phone]    Primary Care Physician   Physician No Ref-Primary    Chief Complaint   AML, not having achieved remission    History is obtained from the patient    History of Present Illness   Alejandra Villegas is a 59 year old female who presents for C2 HiDAC consolidation chemotherapy. Reports to be feeling well overall. Notes she gets \"shaky\" occasionally which improves with taking naps. Also notes she has a new bump posterior to her right ear which is new; states it showed up when she was coughing. Bump is tender with moderate/firm palpation. Otherwise not bothersome. She has been cutting down on her smoking lately; declines any nicotine replacement. Denies any fever, chills, chest pain, shortness of breath, abdominal pain, nausea, vomiting, diarrhea, constipation, overt bleeding, new numbness/tingling, or any other concerns. All questions answered to patient's satisfaction.    Past Medical History    I have reviewed this patient's medical history and updated it with pertinent information if needed.   Past Medical History:   Diagnosis Date    Allergic rhinitis 09/27/2011         Disorder of kidney and ureter 08/08/2008    Kidney disease GFR 87    Dorsalgia     No Comments Provided    Generalized anxiety disorder     No Comments Provided    Hidradenitis " suppurativa     No Comments Provided    Other disorders of lung (CODE) 08/01/2007    Pulmonary nodules, stable on follow-up chest CT 12/08.  No further imaging recommended.    Other specified disorders of Eustachian tube, unspecified ear 02/27/2012         Personal history of other medical treatment (CODE)     Childbirth x4    Rheumatoid arthritis (H) 02/27/2012         Tobacco use     No Comments Provided       Past Surgical History   I have reviewed this patient's surgical history and updated it with pertinent information if needed.  Past Surgical History:   Procedure Laterality Date    ARTHROSCOPY KNEE  05/2017    Dr Torres    ARTHROSCOPY KNEE  07/20/2017    Dr Garza, lateral release, meniscal repair    ARTHROTOMY WRIST Left 2011    Dr. Torres    BONE MARROW BIOPSY, BONE SPECIMEN, NEEDLE/TROCAR Left 02/13/2025    Procedure: Bone marrow biopsy, bone specimen, needle/trocar;  Surgeon: Hema Mari MD;  Location:  OR    CHOLECYSTECTOMY  06/2007    Emergency tracheotomy following failed intubation for laparoscopic cholecystectomy.    DILATION AND CURETTAGE  09/2006         HERNIA REPAIR  2007    Incisoinal hernia repair    HYSTERECTOMY TOTAL ABDOMINAL  02/2010         IMPLANT STIMULATOR AND LEADS SACRAL NERVE (STAGE ONE AND TWO) N/A 12/11/2018    Procedure: Interstim Battery Replacement;  Surgeon: Rl Ambriz MD;  Location:  OR    INTERSTIM DEVICE STAGE 2  01/06/2014    Dr. Ambriz [facility]    LAPAROSCOPIC ABLATION ENDOMETRIOSIS  09/2006         OOPHORECTOMY Left 2010     Dr Thorpe    PICC INSERTION - DOUBLE LUMEN Right 03/05/2025    5FR, DL, total cath length=42 CM, visible cath length= 3CM, brachial medial vein    RECONSTRUCT FOREFOOT WITH METATARSOPHALANGEAL (MTP) FUSION Right 05/05/2022    Procedure: Right fibular sesmoid excision and 1st metatarsal phalangeal joint fusion;  Surgeon: Rl Nathan DPM;  Location: [facility] OR    RELEASE CARPAL TUNNEL  02/02/2017         RELEASE PLANTAR FASCIA Left  12/19/2024    Procedure: excision of soft tissue mass left foot,  gastroc recession;  Surgeon: Rl Nathan DPM;  Location: [facility] OR    REMOVE HARDWARE FOOT Right 08/10/2023    Procedure: REMOVAL, HARDWARE, FOOT;  Surgeon: Rl Nathan DPM;  Location: [facility] OR    SALPINGO OOPHORECTOMY,R/L/KELSEY Right 06/1999    Right salpingo-oophorectomy for hemorrhagic corpus luteum cyst    TRACHEOSTOMY  2007    emergency following failed intubation during cholecystectomy    TYMPANOSTOMY, LOCAL/TOPICAL ANESTHESIA  08/2006    PE tube placement       Prior to Admission Medications   Cannot display prior to admission medications because the patient has not been admitted in this contact.     Allergies   Allergies   Allergen Reactions    Adhesive Tape     Bee Pollen Swelling     Seasonal    Bee Venom Unknown    Folic Acid Itching    Pollen Extract      Seasonal    Amoxicillin Rash    Chlorhexidine Rash     After several weeks, started to develop rash on R neck down to chest and arm. Also noted on back of knees. Providers suggesting related to CHG as nothing else is new for pt.     Liquid Adhesive Rash    Meloxicam Rash    Nabumetone GI Disturbance     GI upset       Social History   I have reviewed this patient's social history and updated it with pertinent information if needed. Alejandra Villegas  reports that she has been smoking cigarettes. She started smoking about 44 years ago. She has a 44.3 pack-year smoking history. She has been exposed to tobacco smoke. She has never used smokeless tobacco. She reports that she does not drink alcohol and does not use drugs.    Family History   I have reviewed this patient's family history and updated it with pertinent information if needed.   Family History   Problem Relation Age of Onset    Arthritis Mother         Arthritis,Rheumatoid    Cancer Mother         Cancer, lung and uterine cancer    Heart Disease Father         Heart Disease,CAD, CABG, peripheral vascular dise    Thyroid Disease  Father         Thyroid Disease, hyperlipidemia    Other - See Comments Father         djd    Asthma Brother         Asthma    Asthma Sister         Asthma    Other - See Comments Sister         Psychiatric illness,Anxiety    Other - See Comments Son         x2 back surgeries    Breast Cancer No family hx of         Cancer-breast       Review of Systems   The 10 point Review of Systems is negative other than noted in the HPI or here.    Physical Exam                      Vital Signs with Ranges  BP: ()/()   Arterial Line BP: ()/()   0 lbs 0 oz  Constitutional: Awake and conversational. Non- toxic appearing.   HEENT: NCAT. Moist mucus membranes without lesions, thrush, or exudates appreciated. Firm bump posterior to right ear. No erythema or discharge around or in right ear visualized.  Respiratory: Breathing comfortably on room air, speaking in full sentences, no evidence of respiratory distress. Lungs CTAB without stridor, wheeze, rhonchi, or rales.   Cardiovascular: Regular rate and rhythm. No peripheral edema.    GI: Abdomen with normoactive bowel sounds, soft and non-tender throughout.   Skin: Skin is clean, dry, intact. No jaundice or significant rashes appreciated on exposed areas.   Neurologic: Alert and with normal speech. Grossly nonfocal.  Moves extremities spontaneously.  Memory and thought process preserved.   Neuropsychiatric: Calm, affect congruent to situation. Appropriate mood and affect.   Vascular access: PICC to be placed on admission      Data   I personally reviewed the EKG tracing showing sinus rhythm Poor R-wave progression; consider anterior infarct, lead placement, or normal variant  .  Results for orders placed or performed during the hospital encounter of 05/06/25 (from the past 24 hours)   CBC with platelets differential    Narrative    The following orders were created for panel order CBC with platelets differential.  Procedure                               Abnormality         Status                      ---------                               -----------         ------                     CBC with platelets and ...[[id number]]  Abnormal            Final result               RBC and Platelet Morpho...[[id number]]  Abnormal            Final result               Manual Differential[[id number]]         Abnormal            Final result                 Please view results for these tests on the individual orders.   Comprehensive metabolic panel   Result Value Ref Range    Sodium 139 135 - 145 mmol/L    Potassium 4.0 3.4 - 5.3 mmol/L    Carbon Dioxide (CO2) 23 22 - 29 mmol/L    Anion Gap 14 7 - 15 mmol/L    Urea Nitrogen 9.3 6.0 - 20.0 mg/dL    Creatinine 0.74 0.51 - 0.95 mg/dL    GFR Estimate >90 >60 mL/min/1.73m2    Calcium 9.5 8.8 - 10.4 mg/dL    Chloride 102 98 - 107 mmol/L    Glucose 135 (H) 70 - 99 mg/dL    Alkaline Phosphatase 137 40 - 150 U/L    AST 24 0 - 45 U/L    ALT 19 0 - 50 U/L    Protein Total 7.1 6.4 - 8.3 g/dL    Albumin 3.8 3.5 - 5.2 g/dL    Bilirubin Total 0.4 <=1.2 mg/dL   Magnesium   Result Value Ref Range    Magnesium 2.5 (H) 1.7 - 2.3 mg/dL   Phosphorus   Result Value Ref Range    Phosphorus 4.0 2.5 - 4.5 mg/dL   Uric acid   Result Value Ref Range    Uric Acid 5.4 2.4 - 5.7 mg/dL   Lactate Dehydrogenase   Result Value Ref Range    Lactate Dehydrogenase 311 (H) 0 - 250 U/L   INR   Result Value Ref Range    INR 1.11 0.85 - 1.15    PT 14.6 11.8 - 14.8 Seconds   Fibrinogen activity   Result Value Ref Range    Fibrinogen Activity 744 (H) 170 - 510 mg/dL   Partial thromboplastin time   Result Value Ref Range    aPTT 28 22 - 38 Seconds   CBC with platelets and differential   Result Value Ref Range    WBC Count 9.6 4.0 - 11.0 10e3/uL    RBC Count 3.31 (L) 3.80 - 5.20 10e6/uL    Hemoglobin 9.6 (L) 11.7 - 15.7 g/dL    Hematocrit 30.9 (L) 35.0 - 47.0 %    MCV 93 78 - 100 fL    MCH 29.0 26.5 - 33.0 pg    MCHC 31.1 (L) 31.5 - 36.5 g/dL    RDW 18.8 (H) 10.0 - 15.0 %    Platelet Count 313 150 - 450  10e3/uL   RBC and Platelet Morphology   Result Value Ref Range    RBC Morphology Confirmed RBC Indices     Platelet Assessment  Automated Count Confirmed. Platelet morphology is normal.     Automated Count Confirmed. Platelet morphology is normal.    Polychromasia Slight (A) None Seen   Manual Differential   Result Value Ref Range    % Neutrophils 69 %    % Lymphocytes 11 %    % Monocytes 14 %    % Eosinophils 3 %    % Basophils 2 %    % Myelocytes 1 %    Absolute Neutrophils 6.6 1.6 - 8.3 10e3/uL    Absolute Lymphocytes 1.1 0.8 - 5.3 10e3/uL    Absolute Monocytes 1.3 0.0 - 1.3 10e3/uL    Absolute Eosinophils 0.3 0.0 - 0.7 10e3/uL    Absolute Basophils 0.2 0.0 - 0.2 10e3/uL    Absolute Myelocytes 0.1 (H) <=0.0 10e3/uL   EKG 12-lead, complete   Result Value Ref Range    Systolic Blood Pressure  mmHg    Diastolic Blood Pressure  mmHg    Ventricular Rate 81 BPM    Atrial Rate 81 BPM    DE Interval 136 ms    QRS Duration 74 ms     ms    QTc 487 ms    P Axis 75 degrees    R AXIS -16 degrees    T Axis 44 degrees    Interpretation ECG       Sinus rhythm  Poor R-wave progression ; consider anterior infarct, lead placement, or normal variant  QTcB >= 480 msec  Abnormal ECG  When compared with ECG of 19-Mar-2025 11:13,  QT has lengthened

## 2025-05-06 NOTE — LETTER
Transition Communication Hand-off for Care Transitions to Next Level of Care Provider    Name: Alejandra Villegas  : 1965  MRN #: [id number]  Primary Care Provider: Ang Gilman     Primary Clinic: No address on file     Reason for Hospitalization:  AML (acute myeloblastic leukemia) (H) [C92.00]  Admit Date/Time: 2025 12:23 PM  Discharge Date: ***  Payor Source: Payor: MEDICARE / Plan: MEDICARE / Product Type: Medicare /     Readmission Assessment Measure (ALICIA) Risk Score/category: ***    Plan of Care Goals/Milestone Events:   Patient Concerns: ***   Patient Goals:   Short-term ***   Long-term ***   Medical Goals   Short-term ***   Long-term ***         Reason for Communication Hand-off Referral: {CCREASONCMCTNHANDOFFREFERRALCTS:95599}    Discharge Plan:       Concern for non-adherence with plan of care:   Y/N ***  Discharge Needs Assessment:  Needs      Flowsheet Row Most Recent Value   Equipment Currently Used at Home none   # of Referrals Placed by CM Outpatient Infusion            Already enrolled in Tele-monitoring program and name of program:  ***  Follow-up specialty is recommended: {YES / NO:82882}    Follow-up plan:    Future Appointments   Date Time Provider Department Center   2025  9:00 AM [facility] INFUSION CHAIR 5 [facility] [place]   2025  1:10 PM [facility] LAB [facility] [place]   2025 10:30 AM Manuel Griffiths MD [facility]   5/15/2025  1:00 PM [facility] [place]   2025  1:00 PM [facility] [place]   2025 12:50 PM [facility] [place]   2025  1:00 PM [facility] [place]   2025  1:00 PM [facility] [place]   2025 12:50 PM [facility] [place]   2025  1:30 PM Samina Harris MD ON[facility] [place]       Any outstanding tests or procedures:              Key Recommendations:      Ngoc Merino    AVS/Discharge Summary is the source of truth; this is a helpful guide for improved communication of patient  story

## 2025-05-07 LAB
ALBUMIN SERPL BCG-MCNC: 3.8 G/DL (ref 3.5–5.2)
ALP SERPL-CCNC: 145 U/L (ref 40–150)
ALT SERPL W P-5'-P-CCNC: 18 U/L (ref 0–50)
ANION GAP SERPL CALCULATED.3IONS-SCNC: 12 MMOL/L (ref 7–15)
APTT PPP: 29 SECONDS (ref 22–38)
AST SERPL W P-5'-P-CCNC: 23 U/L (ref 0–45)
ATRIAL RATE - MUSE: 81 BPM
BASOPHILS # BLD AUTO: 0 10E3/UL (ref 0–0.2)
BASOPHILS NFR BLD AUTO: 1 %
BILIRUB SERPL-MCNC: 0.4 MG/DL
BUN SERPL-MCNC: 13.4 MG/DL (ref 6–20)
CALCIUM SERPL-MCNC: 10 MG/DL (ref 8.8–10.4)
CHLORIDE SERPL-SCNC: 103 MMOL/L (ref 98–107)
CREAT SERPL-MCNC: 0.69 MG/DL (ref 0.51–0.95)
DIASTOLIC BLOOD PRESSURE - MUSE: NORMAL MMHG
EGFRCR SERPLBLD CKD-EPI 2021: >90 ML/MIN/1.73M2
EOSINOPHIL # BLD AUTO: 0 10E3/UL (ref 0–0.7)
EOSINOPHIL NFR BLD AUTO: 0 %
ERYTHROCYTE [DISTWIDTH] IN BLOOD BY AUTOMATED COUNT: 18.2 % (ref 10–15)
FIBRINOGEN PPP-MCNC: 698 MG/DL (ref 170–510)
GLUCOSE SERPL-MCNC: 160 MG/DL (ref 70–99)
HCO3 SERPL-SCNC: 24 MMOL/L (ref 22–29)
HCT VFR BLD AUTO: 32.9 % (ref 35–47)
HGB BLD-MCNC: 10 G/DL (ref 11.7–15.7)
IMM GRANULOCYTES # BLD: 0.1 10E3/UL
IMM GRANULOCYTES NFR BLD: 1 %
INR PPP: 1.1 (ref 0.85–1.15)
INTERPRETATION ECG - MUSE: NORMAL
LYMPHOCYTES # BLD AUTO: 0.3 10E3/UL (ref 0.8–5.3)
LYMPHOCYTES NFR BLD AUTO: 4 %
MAGNESIUM SERPL-MCNC: 2.6 MG/DL (ref 1.7–2.3)
MCH RBC QN AUTO: 29.3 PG (ref 26.5–33)
MCHC RBC AUTO-ENTMCNC: 30.4 G/DL (ref 31.5–36.5)
MCV RBC AUTO: 97 FL (ref 78–100)
MONOCYTES # BLD AUTO: 0.3 10E3/UL (ref 0–1.3)
MONOCYTES NFR BLD AUTO: 4 %
NEUTROPHILS # BLD AUTO: 6.4 10E3/UL (ref 1.6–8.3)
NEUTROPHILS NFR BLD AUTO: 90 %
NRBC # BLD AUTO: 0 10E3/UL
NRBC BLD AUTO-RTO: 0 /100
P AXIS - MUSE: 75 DEGREES
PHOSPHATE SERPL-MCNC: 5.1 MG/DL (ref 2.5–4.5)
PLATELET # BLD AUTO: 327 10E3/UL (ref 150–450)
POTASSIUM SERPL-SCNC: 4.8 MMOL/L (ref 3.4–5.3)
PR INTERVAL - MUSE: 136 MS
PROT SERPL-MCNC: 7.4 G/DL (ref 6.4–8.3)
PROTHROMBIN TIME: 14.2 SECONDS (ref 11.8–14.8)
QRS DURATION - MUSE: 74 MS
QT - MUSE: 420 MS
QTC - MUSE: 487 MS
R AXIS - MUSE: -16 DEGREES
RBC # BLD AUTO: 3.41 10E6/UL (ref 3.8–5.2)
SODIUM SERPL-SCNC: 139 MMOL/L (ref 135–145)
SYSTOLIC BLOOD PRESSURE - MUSE: NORMAL MMHG
T AXIS - MUSE: 44 DEGREES
URATE SERPL-MCNC: 6.2 MG/DL (ref 2.4–5.7)
VENTRICULAR RATE- MUSE: 81 BPM
WBC # BLD AUTO: 7.1 10E3/UL (ref 4–11)

## 2025-05-07 PROCEDURE — 250N000011 HC RX IP 250 OP 636: Performed by: PHYSICIAN ASSISTANT

## 2025-05-07 PROCEDURE — 250N000011 HC RX IP 250 OP 636: Performed by: STUDENT IN AN ORGANIZED HEALTH CARE EDUCATION/TRAINING PROGRAM

## 2025-05-07 PROCEDURE — 250N000013 HC RX MED GY IP 250 OP 250 PS 637

## 2025-05-07 PROCEDURE — 258N000003 HC RX IP 258 OP 636: Performed by: STUDENT IN AN ORGANIZED HEALTH CARE EDUCATION/TRAINING PROGRAM

## 2025-05-07 PROCEDURE — 85730 THROMBOPLASTIN TIME PARTIAL: CPT

## 2025-05-07 PROCEDURE — 99233 SBSQ HOSP IP/OBS HIGH 50: CPT | Mod: FS | Performed by: INTERNAL MEDICINE

## 2025-05-07 PROCEDURE — 85384 FIBRINOGEN ACTIVITY: CPT

## 2025-05-07 PROCEDURE — 84132 ASSAY OF SERUM POTASSIUM: CPT

## 2025-05-07 PROCEDURE — 84100 ASSAY OF PHOSPHORUS: CPT

## 2025-05-07 PROCEDURE — 84550 ASSAY OF BLOOD/URIC ACID: CPT

## 2025-05-07 PROCEDURE — 250N000013 HC RX MED GY IP 250 OP 250 PS 637: Performed by: PHYSICIAN ASSISTANT

## 2025-05-07 PROCEDURE — 250N000011 HC RX IP 250 OP 636

## 2025-05-07 PROCEDURE — 85610 PROTHROMBIN TIME: CPT

## 2025-05-07 PROCEDURE — 82565 ASSAY OF CREATININE: CPT

## 2025-05-07 PROCEDURE — 120N000002 HC R&B MED SURG/OB UMMC

## 2025-05-07 PROCEDURE — 250N000012 HC RX MED GY IP 250 OP 636 PS 637: Performed by: STUDENT IN AN ORGANIZED HEALTH CARE EDUCATION/TRAINING PROGRAM

## 2025-05-07 PROCEDURE — 83735 ASSAY OF MAGNESIUM: CPT

## 2025-05-07 PROCEDURE — 85025 COMPLETE CBC W/AUTO DIFF WBC: CPT

## 2025-05-07 RX ORDER — ALLOPURINOL 300 MG/1
300 TABLET ORAL DAILY
Status: DISCONTINUED | OUTPATIENT
Start: 2025-05-07 | End: 2025-05-08

## 2025-05-07 RX ORDER — CALCIUM ACETATE 667 MG/1
667 CAPSULE ORAL
Status: COMPLETED | OUTPATIENT
Start: 2025-05-07 | End: 2025-05-07

## 2025-05-07 RX ORDER — DEXTROSE MONOHYDRATE, SODIUM CHLORIDE, AND POTASSIUM CHLORIDE 50; 1.49; 4.5 G/1000ML; G/1000ML; G/1000ML
INJECTION, SOLUTION INTRAVENOUS
Status: DISPENSED
Start: 2025-05-07 | End: 2025-05-07

## 2025-05-07 RX ADMIN — ONDANSETRON HYDROCHLORIDE 8 MG: 8 TABLET, FILM COATED ORAL at 08:31

## 2025-05-07 RX ADMIN — GABAPENTIN 400 MG: 300 CAPSULE ORAL at 19:47

## 2025-05-07 RX ADMIN — ENOXAPARIN SODIUM 40 MG: 40 INJECTION SUBCUTANEOUS at 19:48

## 2025-05-07 RX ADMIN — FLUTICASONE FUROATE AND VILANTEROL TRIFENATATE 1 PUFF: 100; 25 POWDER RESPIRATORY (INHALATION) at 08:40

## 2025-05-07 RX ADMIN — Medication 5 ML: at 00:07

## 2025-05-07 RX ADMIN — GABAPENTIN 400 MG: 300 CAPSULE ORAL at 16:43

## 2025-05-07 RX ADMIN — SUCRALFATE 1 G: 1 SUSPENSION ORAL at 08:30

## 2025-05-07 RX ADMIN — SUCRALFATE 1 G: 1 SUSPENSION ORAL at 16:43

## 2025-05-07 RX ADMIN — DEXAMETHASONE 4 MG: 4 TABLET ORAL at 19:47

## 2025-05-07 RX ADMIN — CALCIUM ACETATE 667 MG: 667 CAPSULE ORAL at 12:42

## 2025-05-07 RX ADMIN — CYTARABINE 5730 MG: 100 INJECTION, SOLUTION INTRATHECAL; INTRAVENOUS; SUBCUTANEOUS at 20:32

## 2025-05-07 RX ADMIN — CALCIUM CARBONATE (ANTACID) CHEW TAB 500 MG 500 MG: 500 CHEW TAB at 23:40

## 2025-05-07 RX ADMIN — PREDNISOLONE ACETATE 2 DROP: 10 SUSPENSION/ DROPS OPHTHALMIC at 12:42

## 2025-05-07 RX ADMIN — DEXAMETHASONE 4 MG: 4 TABLET ORAL at 08:31

## 2025-05-07 RX ADMIN — GABAPENTIN 400 MG: 300 CAPSULE ORAL at 08:33

## 2025-05-07 RX ADMIN — SUCRALFATE 1 G: 1 SUSPENSION ORAL at 21:47

## 2025-05-07 RX ADMIN — ACETAMINOPHEN 650 MG: 325 TABLET ORAL at 08:39

## 2025-05-07 RX ADMIN — PAROXETINE HYDROCHLORIDE 20 MG: 20 TABLET, FILM COATED ORAL at 08:42

## 2025-05-07 RX ADMIN — CYANOCOBALAMIN TAB 500 MCG 500 MCG: 500 TAB at 08:41

## 2025-05-07 RX ADMIN — ACYCLOVIR 400 MG: 400 TABLET ORAL at 08:31

## 2025-05-07 RX ADMIN — ACETAMINOPHEN 650 MG: 325 TABLET ORAL at 16:43

## 2025-05-07 RX ADMIN — PANTOPRAZOLE SODIUM 40 MG: 40 TABLET, DELAYED RELEASE ORAL at 08:31

## 2025-05-07 RX ADMIN — PREDNISOLONE ACETATE 2 DROP: 10 SUSPENSION/ DROPS OPHTHALMIC at 08:44

## 2025-05-07 RX ADMIN — Medication 1000 UNITS: at 08:31

## 2025-05-07 RX ADMIN — LORATADINE 10 MG: 10 TABLET ORAL at 08:31

## 2025-05-07 RX ADMIN — PREDNISOLONE ACETATE 2 DROP: 10 SUSPENSION/ DROPS OPHTHALMIC at 19:48

## 2025-05-07 RX ADMIN — PREDNISOLONE ACETATE 2 DROP: 10 SUSPENSION/ DROPS OPHTHALMIC at 16:46

## 2025-05-07 RX ADMIN — ONDANSETRON HYDROCHLORIDE 8 MG: 8 TABLET, FILM COATED ORAL at 19:47

## 2025-05-07 RX ADMIN — FAMOTIDINE 20 MG: 20 TABLET, FILM COATED ORAL at 08:31

## 2025-05-07 RX ADMIN — ALLOPURINOL 300 MG: 300 TABLET ORAL at 08:32

## 2025-05-07 RX ADMIN — Medication 5 ML: at 16:46

## 2025-05-07 RX ADMIN — CYTARABINE 5730 MG: 100 INJECTION, SOLUTION INTRATHECAL; INTRAVENOUS; SUBCUTANEOUS at 09:34

## 2025-05-07 RX ADMIN — POSACONAZOLE 300 MG: 100 TABLET, DELAYED RELEASE ORAL at 08:31

## 2025-05-07 RX ADMIN — CALCIUM ACETATE 667 MG: 667 CAPSULE ORAL at 19:47

## 2025-05-07 RX ADMIN — Medication 5 ML: at 13:05

## 2025-05-07 RX ADMIN — Medication 5 ML: at 05:33

## 2025-05-07 RX ADMIN — SUCRALFATE 1 G: 1 SUSPENSION ORAL at 12:42

## 2025-05-07 RX ADMIN — FAMOTIDINE 20 MG: 20 TABLET, FILM COATED ORAL at 19:47

## 2025-05-07 RX ADMIN — ACYCLOVIR 400 MG: 400 TABLET ORAL at 19:47

## 2025-05-07 RX ADMIN — CALCIUM ACETATE 667 MG: 667 CAPSULE ORAL at 08:31

## 2025-05-07 RX ADMIN — PANTOPRAZOLE SODIUM 40 MG: 40 TABLET, DELAYED RELEASE ORAL at 16:43

## 2025-05-07 ASSESSMENT — ACTIVITIES OF DAILY LIVING (ADL)
ADLS_ACUITY_SCORE: 31
ADLS_ACUITY_SCORE: 35
ADLS_ACUITY_SCORE: 35
ADLS_ACUITY_SCORE: 31
ADLS_ACUITY_SCORE: 35
ADLS_ACUITY_SCORE: 35
ADLS_ACUITY_SCORE: 31
ADLS_ACUITY_SCORE: 35
ADLS_ACUITY_SCORE: 35
ADLS_ACUITY_SCORE: 31
ADLS_ACUITY_SCORE: 31
ADLS_ACUITY_SCORE: 35
ADLS_ACUITY_SCORE: 31
ADLS_ACUITY_SCORE: 35
ADLS_ACUITY_SCORE: 31
DEPENDENT_IADLS:: INDEPENDENT
ADLS_ACUITY_SCORE: 31
ADLS_ACUITY_SCORE: 31

## 2025-05-07 NOTE — CONSULTS
Care Management Initial Consult    General Information  Assessment completed with: PatientAlejandra  Type of CM/SW Visit: Initial Assessment    Primary Care Provider verified and updated as needed: No (Pt does not currently have a PCP, she is coordinating with her oncologist to get set up with one. When she discharges she will have an appt with her oncologist and a referral will be made. She declined further assitance.)   Readmission within the last 30 days: current reason for admission unrelated to previous admission      Reason for Consult: discharge planning (elevated readmission risk score)  Advance Care Planning: Advance Care Planning Reviewed: no concerns identified          Communication Assessment  Patient's communication style: spoken language (English or Bilingual)    Hearing Difficulty or Deaf: no   Wear Glasses or Blind: yes    Cognitive  Cognitive/Neuro/Behavioral: WDL                      Living Environment:   People in home: alone     Current living Arrangements: mobile home      Able to return to prior arrangements: yes       Family/Social Support:  Care provided by: other (see comments) (Sister Bart lives next door and 1 son comes to stay Wed-Sun)  Provides care for: no one  Marital Status:  (reports ex- is supportive & involved)  Support system: Children, Sibling(s), Other (specify) (ex-)          Description of Support System: Supportive, Involved    Support Assessment: Adequate social supports, Adequate family and caregiver support    Current Resources:   Patient receiving home care services: No        Community Resources: None  Equipment currently used at home: none  Supplies currently used at home: None    Employment/Financial:  Employment Status: disabled        Financial Concerns: none           Does the patient's insurance plan have a 3 day qualifying hospital stay waiver?  No    Lifestyle & Psychosocial Needs:  Social Drivers of Health     Food Insecurity: Low  Risk  (5/6/2025)    Food Insecurity     Within the past 12 months, did you worry that your food would run out before you got money to buy more?: No     Within the past 12 months, did the food you bought just not last and you didn t have money to get more?: No   Depression: Not at risk (4/11/2025)    PHQ-2     PHQ-2 Score: 0   Housing Stability: Low Risk  (5/6/2025)    Housing Stability     Do you have housing? : Yes     Are you worried about losing your housing?: No   Tobacco Use: High Risk (4/28/2025)    Patient History     Smoking Tobacco Use: Every Day     Smokeless Tobacco Use: Never     Passive Exposure: Past   Financial Resource Strain: Low Risk  (5/6/2025)    Financial Resource Strain     Within the past 12 months, have you or your family members you live with been unable to get utilities (heat, electricity) when it was really needed?: No   Alcohol Use: Not on file   Transportation Needs: Low Risk  (5/6/2025)    Transportation Needs     Within the past 12 months, has lack of transportation kept you from medical appointments, getting your medicines, non-medical meetings or appointments, work, or from getting things that you need?: No   Physical Activity: Not on file   Interpersonal Safety: Low Risk  (5/6/2025)    Interpersonal Safety     Do you feel physically and emotionally safe where you currently live?: Yes     Within the past 12 months, have you been hit, slapped, kicked or otherwise physically hurt by someone?: No     Within the past 12 months, have you been humiliated or emotionally abused in other ways by your partner or ex-partner?: No   Recent Concern: Interpersonal Safety - High Risk (2/13/2025)    Interpersonal Safety     Do you feel physically and emotionally safe where you currently live?: No     Within the past 12 months, have you been hit, slapped, kicked or otherwise physically hurt by someone?: No     Within the past 12 months, have you been humiliated or emotionally abused in other ways by  "your partner or ex-partner?: No   Stress: Not on file   Social Connections: Not on file   Health Literacy: Not on file       Functional Status:  Prior to admission patient needed assistance:   Dependent ADLs:: Independent  Dependent IADLs:: Independent  Assesssment of Functional Status: Not at  functional baseline    Mental Health Status:  Mental Health Status: No Current Concerns       Chemical Dependency Status:  Chemical Dependency Status: No Current Concerns             Values/Beliefs:  Spiritual, Cultural Beliefs, Faith Practices, Values that affect care: no               Discussed  Partnership in Safe Discharge Planning  document with patient/family: Yes: Pt in agreement with collaborative safe discharge planning.     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 She was subsequently admitted to [facility] on 2/26/25 for further work-up and management...\" - per H&P.     Met with patient to introduce self/role and complete initial assessment. Confirmed patient's address, emergency contacts and insurance. Pt does not currently have a PCP. She is coordinating with her oncologist to establish care with one. When she discharges she will have an appt with her oncologist and a referral will be made. She declined further assitance.    Pt reports she lives in a mobile home with 3 steps to enter. She is independent at baseline in all ADL's and IADL's. Her sister Bart lives next door and is available for assistance as needed. She has 3 sons, one of which lives close-by and stays with her Wed-Sun. She has no DME or in-home medical services. She reports feeling adequately supported with the resources she has at " this time.     Pt denied any mental health, chem dep, or financial concerns. Pt does not have a HCD and did not wish to complete one at this time.     Next Steps: Follow for discharge needs.     Sharmaine George RNCC  Assisting for 5A  Phone [phone]    RN Care Coordinator     Social Work and Care Management Department      SEARCHABLE in [facility] - search CARE COORDINATOR       [place] [employer] [place] ([phone]) Saturday & Sunday; ([phone]) [facility] Recognized Holidays     Units: 5A Onc Vocera & 5C Vocera    Units: 6B Vocera & 6C Vocera    Units: 7A SOT RNCC Vocera, 7B Med Surg Vocera, & 7C Med Surg Vocera    Units: 6A Vocera & 4A CVICU Vocera, 4C MICU Vocera, and 4E SICU Vocera    Units: 5 Ortho Vocera & 5 Med Surg Vocera    Units: 6 Med Surg Vocera & 8 Med Surg Vocera

## 2025-05-07 NOTE — PROGRESS NOTES
Nursing Focus: Chemotherapy  D: Positive blood return via PICC. Insertion site is clean/dry/intact, dressing intact with no complaints of pain.  Urine output is recorded in intake in Doc Flowsheet.    I: Premedications given per order (see electronic medical administration record). Dose #2 of HD Cytarabine started to infuse over 3 hours. Reviewed pt teaching on chemotherapy side effects. Pt denies need for further teaching. Chemotherapy double checked per protocol by two chemotherapy competent RN's.   A: Tolerating procedure well. Denies nausea and or pain.   P: Continue to monitor urine output and symptoms of nausea. Screen for symptoms of toxicity.

## 2025-05-07 NOTE — PLAN OF CARE
Goal Outcome Evaluation:      Plan of Care Reviewed With: patient    Overall Patient Progress: no changeOverall Patient Progress: no change    Outcome Evaluation: Discharge to home when med ready, assess for discharge needs.

## 2025-05-07 NOTE — PLAN OF CARE
Goal Outcome Evaluation:     AVSS.  Denies p/n/v.  2 RN skin check complete, no significant findings.  PICC placed this shift, plan to start chemo when it arrives on the floor.  Walking halls and outside to smoke.  Will continue to monitor and provide supportive cares.

## 2025-05-07 NOTE — PROGRESS NOTES
[facility]    Progress Note  Hematology / Oncology     Date of Admission:  5/6/2025  Hospital Day #: 1   Date of Service (when I saw the patient): 05/07/2025    Assessment & Plan   Alejandra Villegas is a 59 year old female with a past medical history significant for COPD, migraines, rheumatoid arthritis, aortic stenosis, Afib, and anxiety who presented to an outside hospital with cytopenias and was found on BMBx to have hypercellular marrow with 4% blasts, 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 She was subsequently admitted to [facility] on 2/26/25 for further work-up and management and pathology re-review was most consistent with AML with NPM1 mutation by WHO 2022 (which does not require a specific blast threshold). Following further multidisciplinary discussion, she started intensive induction with 7+3 (D1=3/5/25). Her course was complicated by recurrent neutropenic fevers (4 discrete episodes), for which she was treated with broad-spectrum IV antibiotics. Her induction was also notable for severe epigastric pain and reflux symptoms for which GI was consulted. She was found on imaging to have esophagitis and a hiatal hernia, and her symptoms improved with optimization of her GERD therapy and lifestyle modifications as detailed below. With regards to her AML, she tolerated chemotherapy overall very well, and her midpoint bone marrow biopsy showed relative aplasia; subsequently, a repeat biopsy at count recovery demonstrated remission.  Her counts recovered robustly, and she was noted to have marked thrombocytosis, thought reactive/due to marrow overshoot. She then directly proceeded with the first cycle of consolidation with HiDAC (C1D1=4/3/25), which she tolerated very well. She is being admitted 5/6/2025 for C2 consolidation with HiDAC.     Today:  - C2D2 HiDAC consolidation today  - Start allopurinol 300 mg daily  - Give Phoslo 667mg TID x3  doses  - Requested establishing with AML specialist at the [facility] for co-management of care.  - Continue to coordinate Neulasta administration since due on Saturday, 5/10.     HEME  # AML with NPM1 mutation  Had been seen by PCP Dec 2024 w/ progressive fatigue and nausea where she was found leukopenic WBC  2.4 and neutropenic w/ ANC 0.3. Hgb 12. Was referred to local hematology/oncology clinic for further evaluation and seen by Dr. Samina Harris. BMBx 2/10/25 done at OSH showed hypercellular marrow w/ trilineage hematopoiesis w/ dyspoiesis with mildly elevated blasts of 4% on morphology. NGS w/ NPM1, IDH2, and NRAS mutations. She was admitted to [facility] 2/26/25 for further workup and management. Slides were re-reviewed by [facility] Hematopathology, who noted hypercellular marrow with 8% blasts by morphology. Despite relatively low blast percentage, findings were felt most consistent with a diagnosis AML with NPM1 mutation by WHO 2022 (which does not require a specific blast threshold). Following interdepartmental discussions and review of the available literature, patient was started on intensive induction with 7+3 (Day 1=3/5/25). Midcycle BMBx 3/19/25 with no morphologic or immunophenotypic evidence of AML. D28 BMBx completed 3/31/25 with hypercellular marrow (60 to 70%) with no overt dysplasia or increase in blasts, noted flow with 4.3% blasts of unusual phenotype. Due to overall disposition timeline as well as patient living in [facility], preceded directly with consolidation chemotherapy with HiDAC (C1D1=4/3/25) which she tolerated well. Now, proceeding with C2 HiDAC (C2D1 on 5/6/2025).  - PICC placed 5/6/2025, plan to remove prior to discharge  - Baseline cardiopulmonary studies:  - Echo w/ EF 60-65%, mild known aortic stenosis.  - EKG (5/6) with NSR, QTc 487  - CXR (4/27) Small area of new infiltrate versus atelectasis left lung base. No consolidation. R lung clear.  - Baseline viral serologies: CMV IgG+,  EBV IgG+, HepB sAg-, HepB cAb-, HepB sAb-, HSV1+/2+, HIV-  - HLA Typing sent on 3/3/2025 and 4/25/2025. BMT Consult on 4/16, no immediate plans for bone marrow transplant.     Treatment plan: HiDAC (C2D1= 5/6/2025)   - Cytarabine 3 g/m  IV q12h D1-3   - Neulasta 6 mg D5 - to be coordinated  Supportive medications: Zofran 8 mg q12h, Dexamethasone 4 mg q12h D1-3, Prednisolone eye drops QID D1-5     # Pancytopenia due to chemotherapy, resolved  Secondary to underlying hematologic malignancy and exacerbated by recent chemotherapy. Recovered   - Follow daily CBC  - Transfuse to keep Hgb >7, plt >10K    # Risk for TLS  Uric acid 6.1 and Phos 5.1 on 5/7; Potassium WNL at 4.8. Started allopurinol 300 mg daily and Phoslo 667 mg TID x3 doses.  - Phoslo 667mg TID x 3 doses given on 5/7. Closely monitor.  - Continue allopurinol 300 mg daily (5/7-x)  - If e/o SABRA/rising creatinine, start IVF fluids 75-125cc/hr  - If phos >5 start phoslo TID  - If uric acid >8 give rasburicase 6 mg x1 dose  - Additional correction of electrolytes (K+) per standard protocols  ID   # ID prophylaxis  - Acyclovir 400 mg BID  - Levaquin 250 mg daily when ANC <1.0; ANC at admission 6.3  - Posaconazole 300 mg daily - resume on discharge  - Note: Posaconazole copay $1.60. Trialed initially on vori, then rotated to posa x3/18 given visual hallucinations     GI  # GERD, improved  # Epigastric pain, resolved  Reported recent increase in symptoms in the days leading up to her previous admission for C1 of consolidation and initiated famotidine; she had persistent/progressive symptoms throughout that admission. CT C/A/P 4/14/2025 with evidence of esophagitis (thought due to reflux/recent chemotherapy) and small hiatal hernia. Symptoms have historically improved on maximal medical therapy as below.  - Continue famotidine 20 mg BID  - Continue Protonix 40 mg BID  - GI cocktail PRN  - TUMS PRN  - Continue to encourage lifestyle modifications: avoid straws,  carbonated beverages, GERD precautions     # Nausea/vomiting, improved  Patient noted ongoing nausea w/ occasional vomiting starting Dec 2024. Has been managed with PRN Zofran. Now resolved.  - PRN Zofran and compazine     PULM  # COPD  Longstanding history of COPD. On admission patient reports symptoms are manageable with no recent exacerbations. Most recent PFTs (2018) were normal.  - Continue PTA Breo Ellipta inhaler and PRN DuoNebs   - Encourage smoking cessation     CV  # Essential hypertension  - PTA lisinopril 10 mg daily has been HELD     # Infrarenal abdominal aortic aneurysm  Noted incidentally on CT C/A/P (3/19/25), measuring 3.4 cm in diameter.  - Attention on follow-up imaging     RENAL/FEN  # Stage 2 CKD  Baseline Cr ~ 0.7. On admission Cr 0.76  - Continue to trend on daily labs and encourage PO hydration     NEURO  # Chronic migraine w/o aura  # Chronic headaches  Present since childhood. Reportedly triggered by neck manipulation/movement. Reportedly well-managed with regimen below. Headaches occurring throughout admission with daily APAP use, also chronic and managed with APAP PRN at home. Patient notes that her headaches throughout prior hospitalizations have been consistent with her typical daily headaches with no reported changes in character, quality, location, severity, or frequency. She denies new associated neurological changes. May consider LP to exclude CNS leukemia, but given this reassuring history and absence of other indications for LP (no hyperleukocytosis, no monocytic phenotype, no FLT3 mutation, etc), this was historically deferred.  - APAP PRN  - Continue PTA sumatriptan and cyclobenzaprine PRN  - Could reconsider need for LP if headache worsens/changes in character or quality in the future     MISC   # BROOKS  # MDD  # At risk for adjustment disorder  Patient reports good control of anxiety/depressive symptoms prior to admission. Did note feeling overwhelmed by new diagnosis at her  last admission and was offered health psychology or cordelia at that time, which she declined.  - Readdress health psych consult as indicated  - Continue PTA paroxetine   - Atarax as needed     # Tobacco use disorder  Has smoked since age 16, 1.5 ppd (roughly 65 pack years). Attempting to cut back as recently as 01/2025 to 0.5 ppd. She  was educated on the risks of smoking during induction chemotherapy including increased risk for infection in setting of severe neutropenia that could further complicate course, placing her at risk for severe complications that could be life-threatening or even fatal. Patient is trying to cut down on her cigarette use independently; declines any nicotine replacement or pharmacologic assistance at this time.  - Encourage smoking cessation; patient continues to smoke at this time, despite understanding/acknowledgement of the risks. Trying to cut down, however.   - Trialed nicotine patch, then self-discontinued after reporting that she felt the nicotine patch precipitated an anxiety attack/episode of chest pain on 3/9. Has been offered a lower dose versus alternative form of NRT, which patient declined.  - Would continue posaconazole ppx, regardless of ANC, given high risk for fungal pulmonary infection in the setting of ongoing tobacco abuse.     # Degenerative disc disease  Appears likely multi-level; no MRI available for review, though note that patient has previously had epidural injections at L3-4 and L5-S1 (2016). Reported taking gabapentin 400 mg TID PRN prior to admission. Mild exacerbation noted 3/29; improved with resumption of gabapentin.  - Continue gabapentin 400 mg TID     Chronic Problems:  # Vitamin D deficiency - Continue PTA vit D supplement  # Seasonal allergies - Claritin 10 mg daily  # Rheumatoid arthritis - Patient reported trying treatment though was unable to tolerate well. Managed with Tylenol PRN. Most recent RF >650 (2/10/25). JI negative.    # Prediabetes -  "Last A1c 6.0 x12/9/24. Has been managing with lifestyle modifications.   # Mixed hyperlipidemia - Lipid panel has remained at goal, 1/14/25 panel w/ cholesterol 163, , LDL 92, HDL 45. - Held PTA atorvastatin on admission  # H/o aortic stenosis - Patient noted on admission longstanding history of aortic stenosis. Pre-chemo echo w/ EF 60-65% and mild aortic stenosis and insufficiency. No previous echo to compare.    Chronic/MISC  FEN   - IVF per chemotherapy regimen, IVF bolus prn   - PRN lyte replacement per standing protocol  - Regular diet as tolerated      Lines/Drains:   DVT ppx: Lovenox, hold if Plt <50K   GI ppx: PTA Pepcid, Protonix  Consults: TBD  CODE: Full Code; confirmed on admission  DISPO: Discharge to home pending tolerance of consolidation chemotherapy. Anticipated discharge 5/9.   Follow-up/Referrals: TBD. Primary oncologist is Dr. Harris; patient has historically gone to [facility] for twice weekly labs/transfusions and Neulasta.    Clinically Significant Risk Factors                   # Hypertension: Noted on problem list            # Overweight: Estimated body mass index is 28.39 kg/m  as calculated from the following:    Height as of this encounter: 1.676 m (5' 6\").    Weight as of this encounter: 79.8 kg (175 lb 14.4 oz)., PRESENT ON ADMISSION       # Financial/Environmental Concerns: none  # Asthma: noted on problem list           Code Status: Full Code; confirmed on admission  Follow up: TBD. Primary oncologist is Dr. Harris; patient has historically gone to [facility] for twice weekly labs/transfusions and Neulasta.   Medically Ready for Discharge: Anticipated in 2-4 Days       This plan of care has been discussed with the staff physician, Dr. Hwang.    Laci Kerns PA-C  Hematology/Oncology  Pager: #[phone]    I spent 50 minutes in the care of this patient today, which included time necessary for review of interval events, obtaining history and physical " exam, ordering medication(s)/test(s) as medically indicated, discussion with interdisciplinary/consult team(s), and documentation time. Over 50% of time was spent face-to-face and/or coordinating care.    Interval History   Reports to be feeling well overall. No events overnight; nursing notes reviewed. She denies any side effects since starting C2 chemotherapy. Would like to go downstairs to buy some [employer] drinks today. Feels well supported at this hospital, and is very gracious toward staff. Denies any fever, chills, chest pain, shortness of breath, abdominal pain, nausea, vomiting, diarrhea, constipation, overt bleeding, new numbness/tingling, or any other concerns. All questions answered to patient's satisfaction.     A comprehensive review of symptoms was performed and was negative except as detailed in the interval history above.    Physical Exam   Vital Signs with Ranges  Temp:  [97.6  F (36.4  C)-97.8  F (36.6  C)] 97.8  F (36.6  C)  Pulse:  [79-81] 79  Resp:  [18-20] 18  BP: (120-146)/(75-88) 120/78  SpO2:  [93 %-95 %] 93 %    I/O last 3 completed shifts:  In: 240 [P.O.:240]  Out: 1100 [Urine:1100]    Vitals:    05/06/25 1239 05/07/25 0841   Weight: 80.3 kg (177 lb) 79.8 kg (175 lb 14.4 oz)       Constitutional: Awake and conversational. Non-toxic appearing. No acute distress.  HEENT: Normocephalic, atraumatic. Sclerae anicteric. PERRLA. EOM intact. Moist mucus membranes without lesion or abscess. Firm bump posterior to right ear. No erythema or discharge around or in right ear visualized.   Respiratory: Breathing comfortably on room air with no accessory muscle use. Speaking in full sentences, no evidence of respiratory distress. Lungs CTAB, no wheeze or rales.   Cardiovascular: Regular rate and rhythm. S1, S2. No murmurs appreciated.  Circulatory: 2+ radial and 2+ posterior tibialis pulses bilaterally. No peripheral edema.    GI: Abdomen with normoactive bowel sounds, soft, non-distended, and non-tender  throughout. No rebound or guarding.   Skin: Skin is clean, dry, intact. No jaundice or significant rashes appreciated on visible skin exam.   Musculoskeletal/ Extremities: No redness, warmth, or swelling of the joints appreciated.   Neurologic: Alert and oriented with normal speech. Grossly nonfocal exam. Moves all extremities equally and spontaneously.   Neuropsychiatric: Calm, appropriate mood and affect congruent to situation. Good judgment and insight.   Vascular access: PICC on RUE CDI.    Medications   Current Facility-Administered Medications   Medication Dose Route Frequency Provider Last Rate Last Admin       Current Facility-Administered Medications   Medication Dose Route Frequency Provider Last Rate Last Admin    acyclovir (ZOVIRAX) tablet 400 mg  400 mg Oral BID Zully Garcia PA-C   400 mg at 05/07/25 0831    allopurinol (ZYLOPRIM) tablet 300 mg  300 mg Oral Daily Laci Kerns PA-C   300 mg at 05/07/25 0832    calcium acetate (PHOSLO) capsule 667 mg  667 mg Oral TID w/meals Laci Kerns PA-C   667 mg at 05/07/25 1242    cyanocobalamin (VITAMIN B-12) tablet 500 mcg  500 mcg Oral Daily Zully Garcia PA-C   500 mcg at 05/07/25 0841    cytarabine (PF) (CYTOSAR) 5,730 mg in D5W 332.3 mL infusion  3 g/m2 (Treatment Plan Recorded) Intravenous Q12H Giana Herr .8 mL/hr at 05/07/25 0934 5,730 mg at 05/07/25 0934    dexAMETHasone (DECADRON) tablet 4 mg  4 mg Oral Q12H Giana Herr MD   4 mg at 05/07/25 0831    dextrose 5% and 0.45% NaCl + KCl 20 mEq/L 20-5-0.45 MEQ/L-%-% infusion             enoxaparin ANTICOAGULANT (LOVENOX) injection 40 mg  40 mg Subcutaneous Q24H Zully Garcia PA-C   40 mg at 05/06/25 1941    famotidine (PEPCID) tablet 20 mg  20 mg Oral BID Zully Garcia PA-C   20 mg at 05/07/25 0831    [START ON 5/10/2025] filgrastim-aafi (NIVESTYM) injection 480 mcg  480 mcg Subcutaneous Daily at 8 pm Giana Herr MD        fluticasone-vilanterol (BREO  ELLIPTA) 100-25 MCG/ACT inhaler 1 puff  1 puff Inhalation Daily Zully Garcia PA-C   1 puff at 05/07/25 0840    gabapentin (NEURONTIN) capsule 400 mg  400 mg Oral TID Zully Garcia PA-C   400 mg at 05/07/25 0833    heparin lock flush 10 unit/mL injection 5-15 mL  5-15 mL Intracatheter Q24H Laci Kerns PA-C   10 mL at 05/06/25 1737    loratadine (CLARITIN) tablet 10 mg  10 mg Oral Daily Zully Garcia PA-C   10 mg at 05/07/25 0831    ondansetron (ZOFRAN) tablet 8 mg  8 mg Oral Q12H Giana Herr MD   8 mg at 05/07/25 0831    pantoprazole (PROTONIX) EC tablet 40 mg  40 mg Oral BID AC Zully Garcia PA-C   40 mg at 05/07/25 0831    PARoxetine (PAXIL) tablet 20 mg  20 mg Oral Zully Vance PA-C   20 mg at 05/07/25 0842    posaconazole (NOXAFIL) DR tablet TBEC 300 mg  300 mg Oral QAZully Mar PA-C   300 mg at 05/07/25 0831    prednisoLONE acetate (PRED FORTE) 1 % ophthalmic susp 2 drop  2 drop Both Eyes 4x Daily Giana Herr MD   2 drop at 05/07/25 1242    sodium chloride (PF) 0.9% PF flush 10-40 mL  10-40 mL Intracatheter Q8H Laci Kerns PA-C   10 mL at 05/07/25 0010    sucralfate (CARAFATE) suspension 1 g  1 g Oral 4x Daily AC & HS Zully Garcia PA-C   1 g at 05/07/25 1242    Vitamin D3 (CHOLECALCIFEROL) tablet 1,000 Units  1,000 Units Oral Daily Zully Garcia PA-C   1,000 Units at 05/07/25 0831       Antiinfectives  Anti-infectives (From now, onward)      Start     Dose/Rate Route Frequency Ordered Stop    05/07/25 0800  posaconazole (NOXAFIL) DR tablet TBEC 300 mg        Note to Pharmacy: PTA Sig:Take 3 tablets (300 mg) by mouth every morning.      300 mg Oral EVERY MORNING 05/06/25 1337      05/06/25 2000  acyclovir (ZOVIRAX) tablet 400 mg        Note to Pharmacy: PTA Sig:Take 1 tablet (400 mg) by mouth 2 times daily.      400 mg Oral 2 TIMES DAILY 05/06/25 1337              Data   CBC   Recent Labs   Lab 05/07/25  0532 05/06/25  162  05/06/25  1346 05/05/25  1248   WBC 7.1 8.4 9.6 10.5   RBC 3.41* 3.32* 3.31* 3.49*   HGB 10.0* 9.8* 9.6* 10.3*   HCT 32.9* 31.7* 30.9* 32.4*   MCV 97 96 93 93   MCH 29.3 29.5 29.0 29.5   MCHC 30.4* 30.9* 31.1* 31.8   RDW 18.2* 18.7* 18.8* 19.0*    311 313 309       CMP   Recent Labs   Lab 05/07/25  0532 05/06/25  1623 05/06/25  1346 05/05/25  1248    140 139 141   POTASSIUM 4.8 4.1 4.0 3.9   CHLORIDE 103 102 102 103   CO2 24 26 23 23   ANIONGAP 12 12 14 15   * 115* 135* 144*   BUN 13.4 8.8 9.3 9.7   CR 0.69 0.79 0.74 0.76   GFRESTIMATED >90 87 >90 >90   TOBIN 10.0 9.6 9.5 9.5   MAG 2.6*  --  2.5*  --    PHOS 5.1* 4.4 4.0  --    PROTTOTAL 7.4 7.1 7.1 7.4   ALBUMIN 3.8 3.8 3.8 3.7   BILITOTAL 0.4 0.4 0.4 0.5   ALKPHOS 145 140 137 146   AST 23 24 24 22   ALT 18 17 19 18       LFTs   Recent Labs   Lab 05/07/25  0532   PROTTOTAL 7.4   ALBUMIN 3.8   BILITOTAL 0.4   ALKPHOS 145   AST 23   ALT 18       Coagulation Studies   Recent Labs   Lab 05/07/25  0532   INR 1.10   PTT 29

## 2025-05-07 NOTE — PLAN OF CARE
Goal Outcome Evaluation:      Plan of Care Reviewed With: patient    Overall Patient Progress: no changeOverall Patient Progress: no change    Outcome Evaluation: Tolerating chemotherapy well thus far    4504-4925:  C2D2 HiDAC consolidation today   A&O x4. Neuro's intact per baseline  Afebrile.  OVSS on room air  Given prn Tylenol x1 for posterior head/HA with relief  Denies nausea, vomiting, chest pain and SOB  Phosphorous 5.1, started on PhosLo  Good PO intake.  GIDEON.  Had a BM this AM.  Tolerated dose #2 of HD Cytarabine.  No need for electrolyte replacements or blood products per protocol, WDL  Up ad vanda, frequently ambulates hallways and goes outside to smoke at times  Anticipated discharge 5/9 pending chemo completion.  Continue with plan of care.

## 2025-05-07 NOTE — PLAN OF CARE
"Goal Outcome Evaluation:      Plan of Care Reviewed With: patient    Overall Patient Progress: no changeOverall Patient Progress: no change    Outcome Evaluation: No s/s of chemo toxicity    1900- 0700     BP (!) 146/88   Temp 97.6  F (36.4  C) (Oral)   Resp 20   Ht 1.676 m (5' 6\")   Wt 80.3 kg (177 lb)   LMP 01/01/2007 (Approximate)   SpO2 94%   BMI 28.57 kg/m       Reason for admission: 5/6/2025 for C2 consolidation with HiDAC.  Activity: UAL, ambulates frequently  Pain: Posterior head pain Managed by x1 prn tylenol  Neuro: AOX4  Cardiac: WDL  Respiratory: RA, no s/s of distress  GI/: Denies nausea, voiding spontaneously. LBM 5/5.  Diet: Regular  Lines: R. DL PICC  Wounds: No new skin issues  Labs/imaging: Reviewed      No acute changes this shift. Dose#1 cytarabine given, tolerated well. Pt sleeping between cares     Plan: Anticipated discharge 5/9 pending chemo completion,tolerance      Continue to monitor and follow POC      "

## 2025-05-08 ENCOUNTER — TELEPHONE (OUTPATIENT)
Dept: INFUSION THERAPY | Facility: OTHER | Age: 60
End: 2025-05-08
Payer: MEDICARE

## 2025-05-08 ENCOUNTER — TELEPHONE (OUTPATIENT)
Dept: ONCOLOGY | Facility: OTHER | Age: 60
End: 2025-05-08
Payer: MEDICARE

## 2025-05-08 LAB
ABO + RH BLD: NORMAL
ALBUMIN SERPL BCG-MCNC: 3.7 G/DL (ref 3.5–5.2)
ALP SERPL-CCNC: 126 U/L (ref 40–150)
ALT SERPL W P-5'-P-CCNC: 14 U/L (ref 0–50)
ANION GAP SERPL CALCULATED.3IONS-SCNC: 11 MMOL/L (ref 7–15)
APTT PPP: 26 SECONDS (ref 22–38)
AST SERPL W P-5'-P-CCNC: 18 U/L (ref 0–45)
BASOPHILS # BLD AUTO: 0 10E3/UL (ref 0–0.2)
BASOPHILS NFR BLD AUTO: 0 %
BILIRUB SERPL-MCNC: 0.3 MG/DL
BLD GP AB SCN SERPL QL: NEGATIVE
BUN SERPL-MCNC: 19.5 MG/DL (ref 6–20)
CALCIUM SERPL-MCNC: 9.6 MG/DL (ref 8.8–10.4)
CHLORIDE SERPL-SCNC: 101 MMOL/L (ref 98–107)
CREAT SERPL-MCNC: 0.62 MG/DL (ref 0.51–0.95)
EGFRCR SERPLBLD CKD-EPI 2021: >90 ML/MIN/1.73M2
EOSINOPHIL # BLD AUTO: 0 10E3/UL (ref 0–0.7)
EOSINOPHIL NFR BLD AUTO: 0 %
ERYTHROCYTE [DISTWIDTH] IN BLOOD BY AUTOMATED COUNT: 18.3 % (ref 10–15)
FIBRINOGEN PPP-MCNC: 563 MG/DL (ref 170–510)
GLUCOSE SERPL-MCNC: 141 MG/DL (ref 70–99)
HCO3 SERPL-SCNC: 27 MMOL/L (ref 22–29)
HCT VFR BLD AUTO: 29 % (ref 35–47)
HGB BLD-MCNC: 8.9 G/DL (ref 11.7–15.7)
IMM GRANULOCYTES # BLD: 0 10E3/UL
IMM GRANULOCYTES NFR BLD: 1 %
INR PPP: 1.08 (ref 0.85–1.15)
LYMPHOCYTES # BLD AUTO: 0.1 10E3/UL (ref 0.8–5.3)
LYMPHOCYTES NFR BLD AUTO: 1 %
MAGNESIUM SERPL-MCNC: 2.4 MG/DL (ref 1.7–2.3)
MCH RBC QN AUTO: 29.2 PG (ref 26.5–33)
MCHC RBC AUTO-ENTMCNC: 30.7 G/DL (ref 31.5–36.5)
MCV RBC AUTO: 95 FL (ref 78–100)
MONOCYTES # BLD AUTO: 0.4 10E3/UL (ref 0–1.3)
MONOCYTES NFR BLD AUTO: 5 %
NEUTROPHILS # BLD AUTO: 7.3 10E3/UL (ref 1.6–8.3)
NEUTROPHILS NFR BLD AUTO: 93 %
NRBC # BLD AUTO: 0 10E3/UL
NRBC BLD AUTO-RTO: 0 /100
PHOSPHATE SERPL-MCNC: 4.1 MG/DL (ref 2.5–4.5)
PLATELET # BLD AUTO: 292 10E3/UL (ref 150–450)
POTASSIUM SERPL-SCNC: 4.9 MMOL/L (ref 3.4–5.3)
PROT SERPL-MCNC: 6.7 G/DL (ref 6.4–8.3)
PROTHROMBIN TIME: 14 SECONDS (ref 11.8–14.8)
RBC # BLD AUTO: 3.05 10E6/UL (ref 3.8–5.2)
SODIUM SERPL-SCNC: 139 MMOL/L (ref 135–145)
SPECIMEN EXP DATE BLD: NORMAL
URATE SERPL-MCNC: 5.2 MG/DL (ref 2.4–5.7)
WBC # BLD AUTO: 7.8 10E3/UL (ref 4–11)

## 2025-05-08 PROCEDURE — 250N000013 HC RX MED GY IP 250 OP 250 PS 637: Performed by: PHYSICIAN ASSISTANT

## 2025-05-08 PROCEDURE — 82435 ASSAY OF BLOOD CHLORIDE: CPT

## 2025-05-08 PROCEDURE — 84550 ASSAY OF BLOOD/URIC ACID: CPT

## 2025-05-08 PROCEDURE — 99233 SBSQ HOSP IP/OBS HIGH 50: CPT | Mod: FS | Performed by: INTERNAL MEDICINE

## 2025-05-08 PROCEDURE — 84100 ASSAY OF PHOSPHORUS: CPT

## 2025-05-08 PROCEDURE — 258N000003 HC RX IP 258 OP 636: Performed by: STUDENT IN AN ORGANIZED HEALTH CARE EDUCATION/TRAINING PROGRAM

## 2025-05-08 PROCEDURE — 85610 PROTHROMBIN TIME: CPT

## 2025-05-08 PROCEDURE — 250N000011 HC RX IP 250 OP 636: Performed by: STUDENT IN AN ORGANIZED HEALTH CARE EDUCATION/TRAINING PROGRAM

## 2025-05-08 PROCEDURE — 250N000013 HC RX MED GY IP 250 OP 250 PS 637

## 2025-05-08 PROCEDURE — 250N000011 HC RX IP 250 OP 636

## 2025-05-08 PROCEDURE — 85730 THROMBOPLASTIN TIME PARTIAL: CPT

## 2025-05-08 PROCEDURE — 83735 ASSAY OF MAGNESIUM: CPT

## 2025-05-08 PROCEDURE — 250N000012 HC RX MED GY IP 250 OP 636 PS 637: Performed by: STUDENT IN AN ORGANIZED HEALTH CARE EDUCATION/TRAINING PROGRAM

## 2025-05-08 PROCEDURE — 999N000007 HC SITE CHECK

## 2025-05-08 PROCEDURE — 85384 FIBRINOGEN ACTIVITY: CPT

## 2025-05-08 PROCEDURE — 86901 BLOOD TYPING SEROLOGIC RH(D): CPT

## 2025-05-08 PROCEDURE — 85025 COMPLETE CBC W/AUTO DIFF WBC: CPT

## 2025-05-08 PROCEDURE — 999N000202 HC STATISTICAL VASC ACCESS NURSE TIME, 1-15 MINUTES

## 2025-05-08 PROCEDURE — 120N000002 HC R&B MED SURG/OB UMMC

## 2025-05-08 RX ADMIN — SUCRALFATE 1 G: 1 SUSPENSION ORAL at 09:13

## 2025-05-08 RX ADMIN — PREDNISOLONE ACETATE 2 DROP: 10 SUSPENSION/ DROPS OPHTHALMIC at 09:18

## 2025-05-08 RX ADMIN — ONDANSETRON HYDROCHLORIDE 8 MG: 8 TABLET, FILM COATED ORAL at 09:13

## 2025-05-08 RX ADMIN — ACYCLOVIR 400 MG: 400 TABLET ORAL at 09:13

## 2025-05-08 RX ADMIN — PREDNISOLONE ACETATE 2 DROP: 10 SUSPENSION/ DROPS OPHTHALMIC at 21:01

## 2025-05-08 RX ADMIN — DEXAMETHASONE 4 MG: 4 TABLET ORAL at 20:15

## 2025-05-08 RX ADMIN — ALLOPURINOL 300 MG: 300 TABLET ORAL at 09:13

## 2025-05-08 RX ADMIN — CYANOCOBALAMIN TAB 500 MCG 500 MCG: 500 TAB at 09:13

## 2025-05-08 RX ADMIN — CYTARABINE 5730 MG: 100 INJECTION, SOLUTION INTRATHECAL; INTRAVENOUS; SUBCUTANEOUS at 20:58

## 2025-05-08 RX ADMIN — ENOXAPARIN SODIUM 40 MG: 40 INJECTION SUBCUTANEOUS at 20:16

## 2025-05-08 RX ADMIN — PREDNISOLONE ACETATE 2 DROP: 10 SUSPENSION/ DROPS OPHTHALMIC at 13:00

## 2025-05-08 RX ADMIN — GABAPENTIN 400 MG: 300 CAPSULE ORAL at 09:13

## 2025-05-08 RX ADMIN — Medication 1000 UNITS: at 09:13

## 2025-05-08 RX ADMIN — SUCRALFATE 1 G: 1 SUSPENSION ORAL at 13:00

## 2025-05-08 RX ADMIN — GABAPENTIN 400 MG: 300 CAPSULE ORAL at 20:15

## 2025-05-08 RX ADMIN — LORATADINE 10 MG: 10 TABLET ORAL at 09:14

## 2025-05-08 RX ADMIN — DEXAMETHASONE 4 MG: 4 TABLET ORAL at 09:14

## 2025-05-08 RX ADMIN — POSACONAZOLE 300 MG: 100 TABLET, DELAYED RELEASE ORAL at 09:14

## 2025-05-08 RX ADMIN — PANTOPRAZOLE SODIUM 40 MG: 40 TABLET, DELAYED RELEASE ORAL at 16:44

## 2025-05-08 RX ADMIN — SUCRALFATE 1 G: 1 SUSPENSION ORAL at 16:44

## 2025-05-08 RX ADMIN — PREDNISOLONE ACETATE 2 DROP: 10 SUSPENSION/ DROPS OPHTHALMIC at 16:44

## 2025-05-08 RX ADMIN — CYTARABINE 5730 MG: 100 INJECTION, SOLUTION INTRATHECAL; INTRAVENOUS; SUBCUTANEOUS at 10:15

## 2025-05-08 RX ADMIN — PAROXETINE HYDROCHLORIDE 20 MG: 20 TABLET, FILM COATED ORAL at 09:13

## 2025-05-08 RX ADMIN — ACYCLOVIR 400 MG: 400 TABLET ORAL at 20:16

## 2025-05-08 RX ADMIN — FAMOTIDINE 20 MG: 20 TABLET, FILM COATED ORAL at 09:13

## 2025-05-08 RX ADMIN — FLUTICASONE FUROATE AND VILANTEROL TRIFENATATE 1 PUFF: 100; 25 POWDER RESPIRATORY (INHALATION) at 09:18

## 2025-05-08 RX ADMIN — GABAPENTIN 400 MG: 300 CAPSULE ORAL at 13:00

## 2025-05-08 RX ADMIN — FAMOTIDINE 20 MG: 20 TABLET, FILM COATED ORAL at 20:15

## 2025-05-08 RX ADMIN — PANTOPRAZOLE SODIUM 40 MG: 40 TABLET, DELAYED RELEASE ORAL at 09:13

## 2025-05-08 RX ADMIN — ONDANSETRON HYDROCHLORIDE 8 MG: 8 TABLET, FILM COATED ORAL at 20:15

## 2025-05-08 NOTE — PLAN OF CARE
[id number]  Goal Outcome Evaluation:    Plan of Care Reviewed With: patient. Overall Patient Progress: no changeOutcome: Tolerating chemo, anticipated discharge 5/9.     Vitals stable on RA. Headache controlled w/ tylenol x1. Providers aware of bump on back of head on R side, visualized at bedside. Denies nausea/sob. Ambulating frequently to go outside. X3 BM's today, one loose per pt. Showered this evening, no CHG due to allergy. Dose 3 HD cytarabine hung this evening, cerebellar intact. Small wobbles when walking, baseline per pt at night. Anticipated discharge 5/9 after completion of chemo.

## 2025-05-08 NOTE — PROGRESS NOTES
Care Management Follow Up    Length of Stay (days): 2    Expected Discharge Date: 05/09/2025     Concerns to be Addressed:  Outpatient Neulasta injection   Patient plan of care discussed at interdisciplinary rounds: Yes    Anticipated Discharge Disposition: Home      Anticipated Discharge Services:  Outpatient labs & Neulasta injection  Anticipated Discharge DME:  None    Patient/family educated on Medicare website which has current facility and service quality ratings:  NA  Education Provided on the Discharge Plan:    Patient/Family in Agreement with the Plan:      Referrals Placed by CM/SW:    Private pay costs discussed: Not applicable    Discussed  Partnership in Safe Discharge Planning  document with patient/family: Yes, on 05-     Handoff Completed: No, handoff not indicated or clinically appropriate    Additional Information:  Informed by GIOVANNI Thomason, the plan is for chemo tomorrow and then discharge. Pt will need to receive outpt Neulasta injection. GIOVANNI Aguero requested assist in arranging outpt Neulasta injection at her home clinic; injection needs to be given in a certain time frame, (72 hours)? Tomorrow is Friday and her local clinic is not open on the weekend. Pt is scheduled for outpt labs at [facility] on Monday, 5-12 at 1pm.   I called [facility] outpt infusion. They were not able to see the Neulasta orders. I could see them, but it is ordered for 5-10. They will need the order and then obtain insurance auth. Explained Neulasta needs to be given in a certain timeframe. They changed appt to 9am on Mon, 05-12, pending receiving orders & insurance auth.   Sent a message to GIOVANNI Thomason and requested he change date to give Neulasta injection to 05-12-25, outpatient.      4:10pm:  Called [facility] and spoke with Karely. She was able to see the Neulasta order and Therapy Plan. Discussed I will ask the MD to change the administration date from 05-10 to 05-12. Karely will send a  message to their staff  member, Wendy, who obtains insurance prior auth.     Next Steps:   ---Anticipate discharge home tomorrow after chemo.    ---[facility] follow-up with [facility] & they obtained insurance auth for Neulasta. Confirm appt on Mon, 5-12 at 9am for injection & labs.     ---[facility] will pursue insurance prior auth for Neulasta.      [facility] (labs & Neulasta injection)  Phone:  [phone] (ask for the Infusion Center)  Phone:  [phone], Infusion Center Nurse      Diana Steven, RN Care Coordinator  [facility]  [facility]  On 05- RN[facility] coverage for [address] rooms [id number]; Unit 5C rooms [id number] (non-BMT); [address] 6A rooms 3305-4050. Call [phone].

## 2025-05-08 NOTE — PROGRESS NOTES
Nursing Focus: Chemotherapy    D: Positive blood return via PICC . Insertion site is clean/dry/intact, dressing intact with no complaints of pain.  Pre infusion assessment documented in Flowsheet (if applicable).    I: Premedications given per order (see electronic medical administration record). Dose #3 of HD Cytarabine started to infuse over 3 hours. Reviewed pt teaching on chemotherapy side effects.  Pt denies need for further teaching. Chemotherapy double checked per protocol by two chemotherapy competent RN's.   A: Tolerating infusion well. Denies nausea and or pain.   P: Continue to monitor urine output and symptoms of nausea. Screen for symptoms of toxicity.

## 2025-05-08 NOTE — TELEPHONE ENCOUNTER
Received call from Diana, care coordinator with [facility], stating that orders for Neulasta injection are available in Cycle 2 Day 5 of treatment plan. Per Diana, treatment plan day is scheduled for 5/7/2025, but patient will be due to receive on Monday, 5/12/2025. Diana states that she will clarify/confirm date administration due and update [facility] nursing staff. Writer notified her that treatment plan is currently showing as unauthorized, as well. Message sent to prior authorization staff, awaiting response. Diana verbalizes understanding.     Karely Galindo RN on 5/8/2025 at 4:20 PM

## 2025-05-08 NOTE — TELEPHONE ENCOUNTER
RECORDS STATUS - ALL OTHER DIAGNOSIS      RECORDS RECEIVED FROM: [facility]   NOTES STATUS DETAILS   OFFICE NOTE from referring provider [facility] Laci Kerns PA-C   OFFICE NOTE from medical oncologist Epic 4/28/2025 - Dr. Samina Harris   DISCHARGE SUMMARY from hospital [facility] 5/6/2025, 2/26/2025 - [facility]  4/27/2025, 2/14/2025, 2/13/2025 - [facility]    DISCHARGE REPORT from the ER [facility] 4/29/2025 - [facility] ED   OPERATIVE REPORT Epic 2/13/2025 - Bone marrow biopsy, bone specimen, needle/trocar    MEDICATION LIST [facility]    LABS     PATHOLOGY REPORTS [facility] 3/31/2025  - [id number]   3/19/2025 - [id number]     Path Consult:   3/3/2025 - [id number]    ANYTHING RELATED TO DIAGNOSIS Epic 5/8/2025   IMAGING (NEED IMAGES & REPORT)     CT SCANS     MRI     XRAYS PACS Xray Chest: 4/27/2025-11/3/2020   ULTRASOUND     PET     IMAGE DISC FEDEX TRACKING   TRACKING #:

## 2025-05-08 NOTE — PROGRESS NOTES
[facility]    Progress Note  Hematology / Oncology     Date of Admission:  5/6/2025  Hospital Day #: 2   Date of Service (when I saw the patient): 05/08/2025    Assessment & Plan   Alejandra Villegas is a 59 year old female with a past medical history significant for COPD, migraines, rheumatoid arthritis, aortic stenosis, Afib, and anxiety who presented to an outside hospital with cytopenias and was found on BMBx to have hypercellular marrow with 4% blasts, 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 She was subsequently admitted to [facility] on 2/26/25 for further work-up and management and pathology re-review was most consistent with AML with NPM1 mutation by WHO 2022 (which does not require a specific blast threshold). Following further multidisciplinary discussion, she started intensive induction with 7+3 (D1=3/5/25). Her course was complicated by recurrent neutropenic fevers (4 discrete episodes), for which she was treated with broad-spectrum IV antibiotics. Her induction was also notable for severe epigastric pain and reflux symptoms for which GI was consulted. She was found on imaging to have esophagitis and a hiatal hernia, and her symptoms improved with optimization of her GERD therapy and lifestyle modifications as detailed below. With regards to her AML, she tolerated chemotherapy overall very well, and her midpoint bone marrow biopsy showed relative aplasia; subsequently, a repeat biopsy at count recovery demonstrated remission.  Her counts recovered robustly, and she was noted to have marked thrombocytosis, thought reactive/due to marrow overshoot. She then directly proceeded with the first cycle of consolidation with HiDAC (C1D1=4/3/25), which she tolerated very well. She is being admitted 5/6/2025 for C2 consolidation with HiDAC.     Today:  - C2D3 HiDAC consolidation today  - Continue allopurinol 300 mg daily  - Appointment with Dr. Griffiths  scheduled 5/14.  - Neulasta to be administered Monday, 5/12, in the AM. Nurse Coordinator working on OP appointments.     HEME  # AML with NPM1 mutation  Had been seen by PCP Dec 2024 w/ progressive fatigue and nausea where she was found leukopenic WBC  2.4 and neutropenic w/ ANC 0.3. Hgb 12. Was referred to local hematology/oncology clinic for further evaluation and seen by Dr. Samina Harris. BMBx 2/10/25 done at OSH showed hypercellular marrow w/ trilineage hematopoiesis w/ dyspoiesis with mildly elevated blasts of 4% on morphology. NGS w/ NPM1, IDH2, and NRAS mutations. She was admitted to [facility] 2/26/25 for further workup and management. Slides were re-reviewed by [facility] Hematopathology, who noted hypercellular marrow with 8% blasts by morphology. Despite relatively low blast percentage, findings were felt most consistent with a diagnosis AML with NPM1 mutation by WHO 2022 (which does not require a specific blast threshold). Following interdepartmental discussions and review of the available literature, patient was started on intensive induction with 7+3 (Day 1=3/5/25). Midcycle BMBx 3/19/25 with no morphologic or immunophenotypic evidence of AML. D28 BMBx completed 3/31/25 with hypercellular marrow (60 to 70%) with no overt dysplasia or increase in blasts, noted flow with 4.3% blasts of unusual phenotype. Due to overall disposition timeline as well as patient living in [facility], preceded directly with consolidation chemotherapy with HiDAC (C1D1=4/3/25) which she tolerated well. Now, proceeding with C2 HiDAC (C2D1 on 5/6/2025).  - PICC placed 5/6/2025, plan to remove prior to discharge  - Baseline cardiopulmonary studies:  - Echo w/ EF 60-65%, mild known aortic stenosis.  - EKG (5/6) with NSR, QTc 487  - CXR (4/27) Small area of new infiltrate versus atelectasis left lung base. No consolidation. R lung clear.  - Baseline viral serologies: CMV IgG+, EBV IgG+, HepB sAg-, HepB cAb-, HepB sAb-, HSV1+/2+, HIV-  -  HLA Typing sent on 3/3/2025 and 4/25/2025. BMT Consult on 4/16, no immediate plans for bone marrow transplant.     Treatment plan: HiDAC (C2D1= 5/6/2025)   - Cytarabine 3 g/m  IV q12h D1-3   - Neulasta 6 mg D5 - to be coordinated; anticipate administration at local clinic on Monday, 5/12  Supportive medications: Zofran 8 mg q12h, Dexamethasone 4 mg q12h D1-3, Prednisolone eye drops QID D1-5     # Pancytopenia due to chemotherapy, resolved  Secondary to underlying hematologic malignancy and exacerbated by recent chemotherapy. Recovered.  - Follow daily CBC  - Transfuse to keep Hgb >7, plt >10K    # Risk for TLS  Uric acid 6.1 and Phos 5.1 on 5/7; Potassium WNL at 4.8. Was on allopurinol 300 mg x1 day and Phoslo 667 mg TID x3 doses 5/7/2025. Phos and uric subsequently improved.   - Low risk for TLS given AML is likely in remission with ongoing consolidation therapy. Stop allopurinol and TLS labs.     ID   # ID prophylaxis  - Acyclovir 400 mg BID  - Levaquin 250 mg daily when ANC <1.0; ANC at admission 6.3  - Posaconazole 300 mg daily - resume on discharge  - Note: Posaconazole copay $1.60. Trialed initially on vori, then rotated to posa x3/18 given visual hallucinations     GI  # GERD, improved  # Epigastric pain, resolved  Reported recent increase in symptoms in the days leading up to her previous admission for C1 of consolidation and initiated famotidine; she had persistent/progressive symptoms throughout that admission. CT C/A/P 4/14/2025 with evidence of esophagitis (thought due to reflux/recent chemotherapy) and small hiatal hernia. Symptoms have historically improved on maximal medical therapy as below.  - Continue famotidine 20 mg BID  - Continue Protonix 40 mg BID  - GI cocktail PRN  - TUMS PRN  - Continue to encourage lifestyle modifications: avoid straws, carbonated beverages, GERD precautions     # Nausea/vomiting, improved  Patient noted ongoing nausea w/ occasional vomiting starting Dec 2024. Has been  managed with PRN Zofran. Now resolved.  - PRN Zofran and compazine     PULM  # COPD  Longstanding history of COPD. On admission patient reports symptoms are manageable with no recent exacerbations. Most recent PFTs (2018) were normal.  - Continue PTA Breo Ellipta inhaler and PRN DuoNebs   - Encourage smoking cessation     CV  # Essential hypertension  - PTA lisinopril 10 mg daily has been HELD     # Infrarenal abdominal aortic aneurysm  Noted incidentally on CT C/A/P (3/19/25), measuring 3.4 cm in diameter.  - Attention on follow-up imaging     RENAL/FEN  # Stage 2 CKD  Baseline Cr ~ 0.7. On admission Cr 0.76  - Continue to trend on daily labs and encourage PO hydration     NEURO  # Chronic migraine w/o aura  # Chronic headaches  Present since childhood. Reportedly triggered by neck manipulation/movement. Reportedly well-managed with regimen below. Headaches occurring throughout admission with daily APAP use, also chronic and managed with APAP PRN at home. Patient notes that her headaches throughout prior hospitalizations have been consistent with her typical daily headaches with no reported changes in character, quality, location, severity, or frequency. She denies new associated neurological changes. May consider LP to exclude CNS leukemia, but given this reassuring history and absence of other indications for LP (no hyperleukocytosis, no monocytic phenotype, no FLT3 mutation, etc), this was historically deferred.  - APAP PRN  - Continue PTA sumatriptan and cyclobenzaprine PRN  - Could reconsider need for LP if headache worsens/changes in character or quality in the future     MISC   # BROOKS  # MDD  # At risk for adjustment disorder  Patient reports good control of anxiety/depressive symptoms prior to admission. Did note feeling overwhelmed by new diagnosis at her last admission and was offered health psychology or cordelia at that time, which she declined.  - Readdress health psych consult as indicated  - Continue  PTA paroxetine   - Atarax as needed     # Tobacco use disorder  Has smoked since age 16, 1.5 ppd (roughly 65 pack years). Attempting to cut back as recently as 01/2025 to 0.5 ppd. She  was educated on the risks of smoking during induction chemotherapy including increased risk for infection in setting of severe neutropenia that could further complicate course, placing her at risk for severe complications that could be life-threatening or even fatal. Patient is trying to cut down on her cigarette use independently; declines any nicotine replacement or pharmacologic assistance at this time.  - Encourage smoking cessation; patient continues to smoke at this time, despite understanding/acknowledgement of the risks. Trying to cut down, however.   - Trialed nicotine patch, then self-discontinued after reporting that she felt the nicotine patch precipitated an anxiety attack/episode of chest pain on 3/9. Has been offered a lower dose versus alternative form of NRT, which patient declined.  - Would continue posaconazole ppx, regardless of ANC, given high risk for fungal pulmonary infection in the setting of ongoing tobacco abuse.     # Degenerative disc disease  Appears likely multi-level; no MRI available for review, though note that patient has previously had epidural injections at L3-4 and L5-S1 (2016). Reported taking gabapentin 400 mg TID PRN prior to admission. Mild exacerbation noted 3/29; improved with resumption of gabapentin.  - Continue gabapentin 400 mg TID     Chronic Problems:  # Vitamin D deficiency - Continue PTA vit D supplement  # Seasonal allergies - Claritin 10 mg daily  # Rheumatoid arthritis - Patient reported trying treatment though was unable to tolerate well. Managed with Tylenol PRN. Most recent RF >650 (2/10/25). JI negative.    # Prediabetes - Last A1c 6.0 x12/9/24. Has been managing with lifestyle modifications.   # Mixed hyperlipidemia - Lipid panel has remained at goal, 1/14/25 panel w/  "cholesterol 163, , LDL 92, HDL 45. - Held PTA atorvastatin on admission  # H/o aortic stenosis - Patient noted on admission longstanding history of aortic stenosis. Pre-chemo echo w/ EF 60-65% and mild aortic stenosis and insufficiency. No previous echo to compare.    Chronic/MISC  FEN   - IVF per chemotherapy regimen, IVF bolus prn   - PRN lyte replacement per standing protocol  - Regular diet as tolerated      Lines/Drains:   DVT ppx: Lovenox, hold if Plt <50K   GI ppx: PTA Pepcid, Protonix  Consults: TBD  CODE: Full Code; confirmed on admission  DISPO: Discharge to home pending tolerance of consolidation chemotherapy. Anticipated discharge 5/9.   Follow-up/Referrals: TBD. Primary oncologist is Dr. Harris; patient has historically gone to [facility] for twice weekly labs/transfusions and Neulasta.    Clinically Significant Risk Factors                   # Hypertension: Noted on problem list            # Overweight: Estimated body mass index is 28.55 kg/m  as calculated from the following:    Height as of this encounter: 1.676 m (5' 6\").    Weight as of this encounter: 80.2 kg (176 lb 14.4 oz)., PRESENT ON ADMISSION       # Financial/Environmental Concerns: none  # Asthma: noted on problem list           Code Status: Full Code; confirmed on admission  Follow up: TBD. Primary oncologist is Dr. Harris; patient has historically gone to [facility] for twice weekly labs/transfusions and Neulasta. Appointment scheduled with Dr. Griffiths on 5/14 for co-management.  Medically Ready for Discharge: Anticipated in 2-4 Days       This plan of care has been discussed with the staff physician, Dr. Hwang.    Laci Kerns PA-C  Hematology/Oncology  Pager: #[phone]    I spent 50 minutes in the care of this patient today, which included time necessary for review of interval events, obtaining history and physical exam, ordering medication(s)/test(s) as medically indicated, discussion with " "interdisciplinary/consult team(s), and documentation time. Over 50% of time was spent face-to-face and/or coordinating care.    Interval History   Nursing notes reviewed. No acute events overnight. Patient had episode of \"wobbliness\" last evening with walking around the unit which she states is baseline for her. Notes she occasionally gets \"shaky\" which improves with resting. No balance concerns, dizziness, headaches, vision changes, hearing changes, numbness, or weakness. Patient reports to be feeling well overall; no noticeable side effects with HiDAC. Denies any fever, chills, chest pain, shortness of breath, abdominal pain, nausea, vomiting, diarrhea, constipation, overt bleeding, new numbness/tingling, or any other concerns. All questions answered to patient's satisfaction.    A comprehensive review of symptoms was performed and was negative except as detailed in the interval history above.    Physical Exam   Vital Signs with Ranges  Temp:  [97.7  F (36.5  C)-98.1  F (36.7  C)] 97.8  F (36.6  C)  Pulse:  [85-87] 85  Resp:  [18-20] 20  BP: (120-136)/(67-85) 136/85  SpO2:  [93 %-95 %] 93 %    I/O last 3 completed shifts:  In: 1340 [P.O.:1340]  Out: 950 [Urine:950]    Vitals:    05/06/25 1239 05/07/25 0841 05/08/25 0922   Weight: 80.3 kg (177 lb) 79.8 kg (175 lb 14.4 oz) 80.2 kg (176 lb 14.4 oz)       Constitutional: Awake and conversational. Non-toxic appearing. No acute distress.  HEENT: Normocephalic, atraumatic. Sclerae anicteric. PERRLA. EOM intact. Moist mucus membranes without lesion or abscess. Firm bump posterior to right ear. No erythema or discharge around or in right ear visualized.   Respiratory: Breathing comfortably on room air with no accessory muscle use. Speaking in full sentences, no evidence of respiratory distress. Lungs CTAB, no wheeze or rales.   Cardiovascular: Regular rate and rhythm. S1, S2. No murmurs appreciated.  Circulatory: 2+ radial and 2+ posterior tibialis pulses bilaterally. No " peripheral edema.    GI: Abdomen with normoactive bowel sounds, soft, non-distended, and non-tender throughout. No rebound or guarding.  Skin: Skin is clean, dry, intact. No jaundice or significant rashes appreciated on visible skin exam.   Musculoskeletal/ Extremities: No redness, warmth, or swelling of the joints appreciated.  Neurologic: Alert and oriented with normal speech. Grossly nonfocal exam. Moves all extremities equally and spontaneously.  Point-to-point testing WNL. Gait favors left hemisphere slightly.   Neuropsychiatric: Calm, appropriate mood and affect congruent to situation. Good judgment and insight.   Vascular access: PICC on RUE CDI.    Medications   Current Facility-Administered Medications   Medication Dose Route Frequency Provider Last Rate Last Admin       Current Facility-Administered Medications   Medication Dose Route Frequency Provider Last Rate Last Admin    acyclovir (ZOVIRAX) tablet 400 mg  400 mg Oral BID Zully Garcia PA-C   400 mg at 05/08/25 0913    allopurinol (ZYLOPRIM) tablet 300 mg  300 mg Oral Daily Laci Kerns PA-C   300 mg at 05/08/25 0913    cyanocobalamin (VITAMIN B-12) tablet 500 mcg  500 mcg Oral Daily Zully Garcia PA-C   500 mcg at 05/08/25 0913    cytarabine (PF) (CYTOSAR) 5,730 mg in D5W 332.3 mL infusion  3 g/m2 (Treatment Plan Recorded) Intravenous Q12H Giana Herr .8 mL/hr at 05/07/25 2032 5,730 mg at 05/07/25 2032    dexAMETHasone (DECADRON) tablet 4 mg  4 mg Oral Q12H Giana Herr MD   4 mg at 05/08/25 0914    enoxaparin ANTICOAGULANT (LOVENOX) injection 40 mg  40 mg Subcutaneous Q24H Zully Garcia PA-C   40 mg at 05/07/25 1948    famotidine (PEPCID) tablet 20 mg  20 mg Oral BID Zully Garcia PA-C   20 mg at 05/08/25 0913    [START ON 5/10/2025] filgrastim-aafi (NIVESTYM) injection 480 mcg  480 mcg Subcutaneous Daily at 8 pm Giana Herr MD        fluticasone-vilanterol (BREO ELLIPTA) 100-25 MCG/ACT inhaler 1  puff  1 puff Inhalation Daily Zully Garcia PA-C   1 puff at 05/08/25 0918    gabapentin (NEURONTIN) capsule 400 mg  400 mg Oral TID Zully Garcia PA-C   400 mg at 05/08/25 0913    heparin lock flush 10 unit/mL injection 5-15 mL  5-15 mL Intracatheter Q24H Laci Kerns PA-C   5 mL at 05/07/25 1646    loratadine (CLARITIN) tablet 10 mg  10 mg Oral Daily Zully Garcia PA-C   10 mg at 05/08/25 0914    ondansetron (ZOFRAN) tablet 8 mg  8 mg Oral Q12H Giana Herr MD   8 mg at 05/08/25 0913    pantoprazole (PROTONIX) EC tablet 40 mg  40 mg Oral BID AC Zully Garcia PA-C   40 mg at 05/08/25 0913    PARoxetine (PAXIL) tablet 20 mg  20 mg Oral Zully Vance PA-C   20 mg at 05/08/25 0913    posaconazole (NOXAFIL) DR tablet TBEC 300 mg  300 mg Oral PEDROM Zully Garcia PA-C   300 mg at 05/08/25 0914    prednisoLONE acetate (PRED FORTE) 1 % ophthalmic susp 2 drop  2 drop Both Eyes 4x Daily Giana Herr MD   2 drop at 05/08/25 0918    sodium chloride (PF) 0.9% PF flush 10-40 mL  10-40 mL Intracatheter Q8H Laci Kerns PA-C   10 mL at 05/07/25 1646    sucralfate (CARAFATE) suspension 1 g  1 g Oral 4x Daily AC & HS Zully Garcia PA-C   1 g at 05/08/25 0913    Vitamin D3 (CHOLECALCIFEROL) tablet 1,000 Units  1,000 Units Oral Daily Zully Garcia PA-C   1,000 Units at 05/08/25 0913       Antiinfectives  Anti-infectives (From now, onward)      Start     Dose/Rate Route Frequency Ordered Stop    05/07/25 0800  posaconazole (NOXAFIL) DR tablet TBEC 300 mg        Note to Pharmacy: PTA Sig:Take 3 tablets (300 mg) by mouth every morning.      300 mg Oral EVERY MORNING 05/06/25 1337      05/06/25 2000  acyclovir (ZOVIRAX) tablet 400 mg        Note to Pharmacy: PTA Sig:Take 1 tablet (400 mg) by mouth 2 times daily.      400 mg Oral 2 TIMES DAILY 05/06/25 1337              Data   CBC   Recent Labs   Lab 05/08/25  0547 05/07/25  0532 05/06/25  1623 05/06/25  1346   WBC 7.8  7.1 8.4 9.6   RBC 3.05* 3.41* 3.32* 3.31*   HGB 8.9* 10.0* 9.8* 9.6*   HCT 29.0* 32.9* 31.7* 30.9*   MCV 95 97 96 93   MCH 29.2 29.3 29.5 29.0   MCHC 30.7* 30.4* 30.9* 31.1*   RDW 18.3* 18.2* 18.7* 18.8*    327 311 313       CMP   Recent Labs   Lab 05/08/25  0547 05/07/25  0532 05/06/25  1623 05/06/25  1346    139 140 139   POTASSIUM 4.9 4.8 4.1 4.0   CHLORIDE 101 103 102 102   CO2 27 24 26 23   ANIONGAP 11 12 12 14   * 160* 115* 135*   BUN 19.5 13.4 8.8 9.3   CR 0.62 0.69 0.79 0.74   GFRESTIMATED >90 >90 87 >90   TOBIN 9.6 10.0 9.6 9.5   MAG 2.4* 2.6*  --  2.5*   PHOS 4.1 5.1* 4.4 4.0   PROTTOTAL 6.7 7.4 7.1 7.1   ALBUMIN 3.7 3.8 3.8 3.8   BILITOTAL 0.3 0.4 0.4 0.4   ALKPHOS 126 145 140 137   AST 18 23 24 24   ALT 14 18 17 19       LFTs   Recent Labs   Lab 05/08/25  0547   PROTTOTAL 6.7   ALBUMIN 3.7   BILITOTAL 0.3   ALKPHOS 126   AST 18   ALT 14       Coagulation Studies   Recent Labs   Lab 05/08/25  0547   INR 1.08   PTT 26

## 2025-05-08 NOTE — NURSING NOTE
Diana from [facility] called in wanting to get patient scheduled for neulasta injection for 5/12/25 before 11 am due to receiving chemo on 5/9/25. She states Neulasta needs to administered within 72 hours. I told her that we would need orders and that it would need to be authorized by insurance. I stated I would add her to the schedule for now to hold the spot. She stated she will try to get the order and call back either later today or tomorrow with more information. Mey Burns RN ....................  5/8/2025   3:03 PM

## 2025-05-09 VITALS
RESPIRATION RATE: 18 BRPM | BODY MASS INDEX: 28.43 KG/M2 | DIASTOLIC BLOOD PRESSURE: 87 MMHG | SYSTOLIC BLOOD PRESSURE: 145 MMHG | TEMPERATURE: 97.7 F | HEART RATE: 81 BPM | HEIGHT: 66 IN | WEIGHT: 176.9 LBS | OXYGEN SATURATION: 95 %

## 2025-05-09 VITALS
DIASTOLIC BLOOD PRESSURE: 86 MMHG | TEMPERATURE: 97.5 F | RESPIRATION RATE: 18 BRPM | WEIGHT: 175.5 LBS | HEIGHT: 66 IN | SYSTOLIC BLOOD PRESSURE: 128 MMHG | BODY MASS INDEX: 28.21 KG/M2 | OXYGEN SATURATION: 94 % | HEART RATE: 79 BPM

## 2025-05-09 LAB
ALBUMIN SERPL BCG-MCNC: 3.7 G/DL (ref 3.5–5.2)
ALP SERPL-CCNC: 120 U/L (ref 40–150)
ALT SERPL W P-5'-P-CCNC: 15 U/L (ref 0–50)
ANION GAP SERPL CALCULATED.3IONS-SCNC: 11 MMOL/L (ref 7–15)
APTT PPP: 25 SECONDS (ref 22–38)
AST SERPL W P-5'-P-CCNC: 19 U/L (ref 0–45)
BASOPHILS # BLD AUTO: 0 10E3/UL (ref 0–0.2)
BASOPHILS NFR BLD AUTO: 0 %
BILIRUB SERPL-MCNC: 0.5 MG/DL
BUN SERPL-MCNC: 25.6 MG/DL (ref 6–20)
CALCIUM SERPL-MCNC: 9.5 MG/DL (ref 8.8–10.4)
CHLORIDE SERPL-SCNC: 100 MMOL/L (ref 98–107)
CREAT SERPL-MCNC: 0.66 MG/DL (ref 0.51–0.95)
EGFRCR SERPLBLD CKD-EPI 2021: >90 ML/MIN/1.73M2
EOSINOPHIL # BLD AUTO: 0 10E3/UL (ref 0–0.7)
EOSINOPHIL NFR BLD AUTO: 0 %
ERYTHROCYTE [DISTWIDTH] IN BLOOD BY AUTOMATED COUNT: 17.1 % (ref 10–15)
FIBRINOGEN PPP-MCNC: 458 MG/DL (ref 170–510)
GLUCOSE SERPL-MCNC: 140 MG/DL (ref 70–99)
HCO3 SERPL-SCNC: 26 MMOL/L (ref 22–29)
HCT VFR BLD AUTO: 28.8 % (ref 35–47)
HGB BLD-MCNC: 9 G/DL (ref 11.7–15.7)
IMM GRANULOCYTES # BLD: 0.1 10E3/UL
IMM GRANULOCYTES NFR BLD: 1 %
INR PPP: 1.07 (ref 0.85–1.15)
LYMPHOCYTES # BLD AUTO: 0.1 10E3/UL (ref 0.8–5.3)
LYMPHOCYTES NFR BLD AUTO: 1 %
MAGNESIUM SERPL-MCNC: 2.3 MG/DL (ref 1.7–2.3)
MCH RBC QN AUTO: 29.8 PG (ref 26.5–33)
MCHC RBC AUTO-ENTMCNC: 31.3 G/DL (ref 31.5–36.5)
MCV RBC AUTO: 95 FL (ref 78–100)
MONOCYTES # BLD AUTO: 0.1 10E3/UL (ref 0–1.3)
MONOCYTES NFR BLD AUTO: 1 %
NEUTROPHILS # BLD AUTO: 6.8 10E3/UL (ref 1.6–8.3)
NEUTROPHILS NFR BLD AUTO: 97 %
NRBC # BLD AUTO: 0 10E3/UL
NRBC BLD AUTO-RTO: 0 /100
PHOSPHATE SERPL-MCNC: 3.9 MG/DL (ref 2.5–4.5)
PLATELET # BLD AUTO: 260 10E3/UL (ref 150–450)
POTASSIUM SERPL-SCNC: 4.2 MMOL/L (ref 3.4–5.3)
PROT SERPL-MCNC: 6.7 G/DL (ref 6.4–8.3)
PROTHROMBIN TIME: 13.9 SECONDS (ref 11.8–14.8)
RBC # BLD AUTO: 3.02 10E6/UL (ref 3.8–5.2)
SODIUM SERPL-SCNC: 137 MMOL/L (ref 135–145)
WBC # BLD AUTO: 7 10E3/UL (ref 4–11)

## 2025-05-09 PROCEDURE — 250N000011 HC RX IP 250 OP 636: Performed by: STUDENT IN AN ORGANIZED HEALTH CARE EDUCATION/TRAINING PROGRAM

## 2025-05-09 PROCEDURE — 99239 HOSP IP/OBS DSCHRG MGMT >30: CPT | Mod: FS | Performed by: PHYSICIAN ASSISTANT

## 2025-05-09 PROCEDURE — 85025 COMPLETE CBC W/AUTO DIFF WBC: CPT

## 2025-05-09 PROCEDURE — 250N000012 HC RX MED GY IP 250 OP 636 PS 637: Performed by: STUDENT IN AN ORGANIZED HEALTH CARE EDUCATION/TRAINING PROGRAM

## 2025-05-09 PROCEDURE — 85384 FIBRINOGEN ACTIVITY: CPT

## 2025-05-09 PROCEDURE — 82565 ASSAY OF CREATININE: CPT

## 2025-05-09 PROCEDURE — 85730 THROMBOPLASTIN TIME PARTIAL: CPT

## 2025-05-09 PROCEDURE — 84100 ASSAY OF PHOSPHORUS: CPT

## 2025-05-09 PROCEDURE — 83735 ASSAY OF MAGNESIUM: CPT

## 2025-05-09 PROCEDURE — 84295 ASSAY OF SERUM SODIUM: CPT

## 2025-05-09 PROCEDURE — 85610 PROTHROMBIN TIME: CPT

## 2025-05-09 PROCEDURE — 250N000013 HC RX MED GY IP 250 OP 250 PS 637

## 2025-05-09 PROCEDURE — 258N000003 HC RX IP 258 OP 636: Performed by: STUDENT IN AN ORGANIZED HEALTH CARE EDUCATION/TRAINING PROGRAM

## 2025-05-09 RX ORDER — ACYCLOVIR 400 MG/1
400 TABLET ORAL 2 TIMES DAILY
Qty: 60 TABLET | Refills: 0 | Status: SHIPPED | OUTPATIENT
Start: 2025-05-09

## 2025-05-09 RX ORDER — ACETAMINOPHEN 325 MG/1
650 TABLET ORAL EVERY 4 HOURS PRN
Qty: 90 TABLET | Refills: 0 | Status: SHIPPED | OUTPATIENT
Start: 2025-05-09

## 2025-05-09 RX ORDER — GABAPENTIN 400 MG/1
400 CAPSULE ORAL 3 TIMES DAILY
Qty: 90 CAPSULE | Refills: 0 | Status: SHIPPED | OUTPATIENT
Start: 2025-05-09

## 2025-05-09 RX ORDER — ONDANSETRON 4 MG/1
4-8 TABLET, ORALLY DISINTEGRATING ORAL EVERY 8 HOURS PRN
Qty: 45 TABLET | Refills: 0 | Status: SHIPPED | OUTPATIENT
Start: 2025-05-09

## 2025-05-09 RX ORDER — PREDNISOLONE ACETATE 10 MG/ML
2 SUSPENSION/ DROPS OPHTHALMIC 4 TIMES DAILY
Qty: 5 ML | Refills: 0 | Status: SHIPPED | OUTPATIENT
Start: 2025-05-09

## 2025-05-09 RX ORDER — POTASSIUM CHLORIDE 1500 MG/1
20 TABLET, EXTENDED RELEASE ORAL DAILY
Qty: 30 TABLET | Refills: 0 | Status: CANCELLED | OUTPATIENT
Start: 2025-05-09

## 2025-05-09 RX ORDER — ALBUTEROL SULFATE 0.83 MG/ML
2.5 SOLUTION RESPIRATORY (INHALATION)
Qty: 90 ML | Refills: 0 | Status: SHIPPED | OUTPATIENT
Start: 2025-05-09 | End: 2025-05-09

## 2025-05-09 RX ORDER — SUMATRIPTAN 50 MG/1
50 TABLET, FILM COATED ORAL
Qty: 30 TABLET | Refills: 0 | Status: SHIPPED | OUTPATIENT
Start: 2025-05-09

## 2025-05-09 RX ORDER — POSACONAZOLE 100 MG/1
300 TABLET, DELAYED RELEASE ORAL EVERY MORNING
Qty: 90 TABLET | Refills: 0 | Status: SHIPPED | OUTPATIENT
Start: 2025-05-10

## 2025-05-09 RX ORDER — ALBUTEROL SULFATE 0.83 MG/ML
2.5 SOLUTION RESPIRATORY (INHALATION) 4 TIMES DAILY PRN
Qty: 90 ML | Refills: 0 | Status: SHIPPED | OUTPATIENT
Start: 2025-05-09

## 2025-05-09 RX ORDER — SUCRALFATE ORAL 1 G/10ML
1 SUSPENSION ORAL
Qty: 473 ML | Refills: 0 | Status: SHIPPED | OUTPATIENT
Start: 2025-05-09

## 2025-05-09 RX ORDER — PAROXETINE 20 MG/1
20 TABLET, FILM COATED ORAL EVERY MORNING
Qty: 30 TABLET | Refills: 0 | Status: SHIPPED | OUTPATIENT
Start: 2025-05-09

## 2025-05-09 RX ORDER — ALBUTEROL SULFATE 90 UG/1
1-2 INHALANT RESPIRATORY (INHALATION) 4 TIMES DAILY PRN
Qty: 18 G | Refills: 0 | Status: SHIPPED | OUTPATIENT
Start: 2025-05-09

## 2025-05-09 RX ORDER — VITAMIN B COMPLEX
25 TABLET ORAL DAILY
Qty: 30 TABLET | Refills: 0 | Status: SHIPPED | OUTPATIENT
Start: 2025-05-10

## 2025-05-09 RX ORDER — PROCHLORPERAZINE MALEATE 5 MG/1
5 TABLET ORAL EVERY 6 HOURS PRN
Qty: 30 TABLET | Refills: 0 | Status: SHIPPED | OUTPATIENT
Start: 2025-05-09

## 2025-05-09 RX ORDER — ROSUVASTATIN CALCIUM 20 MG/1
20 TABLET, COATED ORAL DAILY
Qty: 30 TABLET | Refills: 0 | Status: SHIPPED | OUTPATIENT
Start: 2025-05-09

## 2025-05-09 RX ORDER — IPRATROPIUM BROMIDE AND ALBUTEROL SULFATE 2.5; .5 MG/3ML; MG/3ML
1 SOLUTION RESPIRATORY (INHALATION) EVERY 4 HOURS PRN
Qty: 90 ML | Refills: 0 | Status: SHIPPED | OUTPATIENT
Start: 2025-05-09 | End: 2025-05-09

## 2025-05-09 RX ORDER — LEVOFLOXACIN 250 MG/1
250 TABLET, FILM COATED ORAL DAILY
Qty: 30 TABLET | Refills: 0 | Status: SHIPPED | OUTPATIENT
Start: 2025-05-09

## 2025-05-09 RX ORDER — PANTOPRAZOLE SODIUM 40 MG/1
40 TABLET, DELAYED RELEASE ORAL
Qty: 60 TABLET | Refills: 0 | Status: SHIPPED | OUTPATIENT
Start: 2025-05-09

## 2025-05-09 RX ORDER — ALBUTEROL SULFATE 90 UG/1
1-2 INHALANT RESPIRATORY (INHALATION)
Qty: 18 G | Refills: 0 | Status: SHIPPED | OUTPATIENT
Start: 2025-05-09 | End: 2025-05-09

## 2025-05-09 RX ORDER — IPRATROPIUM BROMIDE AND ALBUTEROL SULFATE 2.5; .5 MG/3ML; MG/3ML
1 SOLUTION RESPIRATORY (INHALATION) EVERY 4 HOURS PRN
Status: SHIPPED
Start: 2025-05-09

## 2025-05-09 RX ORDER — FAMOTIDINE 20 MG/1
20 TABLET, FILM COATED ORAL 2 TIMES DAILY
Qty: 60 TABLET | Refills: 0 | Status: SHIPPED | OUTPATIENT
Start: 2025-05-09

## 2025-05-09 RX ADMIN — PAROXETINE HYDROCHLORIDE 20 MG: 20 TABLET, FILM COATED ORAL at 07:32

## 2025-05-09 RX ADMIN — FLUTICASONE FUROATE AND VILANTEROL TRIFENATATE 1 PUFF: 100; 25 POWDER RESPIRATORY (INHALATION) at 07:35

## 2025-05-09 RX ADMIN — Medication 1000 UNITS: at 07:32

## 2025-05-09 RX ADMIN — PREDNISOLONE ACETATE 2 DROP: 10 SUSPENSION/ DROPS OPHTHALMIC at 07:35

## 2025-05-09 RX ADMIN — LORATADINE 10 MG: 10 TABLET ORAL at 07:32

## 2025-05-09 RX ADMIN — GABAPENTIN 400 MG: 300 CAPSULE ORAL at 07:32

## 2025-05-09 RX ADMIN — ACYCLOVIR 400 MG: 400 TABLET ORAL at 07:31

## 2025-05-09 RX ADMIN — ONDANSETRON HYDROCHLORIDE 8 MG: 8 TABLET, FILM COATED ORAL at 07:31

## 2025-05-09 RX ADMIN — PREDNISOLONE ACETATE 2 DROP: 10 SUSPENSION/ DROPS OPHTHALMIC at 12:37

## 2025-05-09 RX ADMIN — SUCRALFATE 1 G: 1 SUSPENSION ORAL at 11:04

## 2025-05-09 RX ADMIN — CYTARABINE 5730 MG: 100 INJECTION, SOLUTION INTRATHECAL; INTRAVENOUS; SUBCUTANEOUS at 08:14

## 2025-05-09 RX ADMIN — SUCRALFATE 1 G: 1 SUSPENSION ORAL at 07:32

## 2025-05-09 RX ADMIN — POSACONAZOLE 300 MG: 100 TABLET, DELAYED RELEASE ORAL at 07:32

## 2025-05-09 RX ADMIN — PANTOPRAZOLE SODIUM 40 MG: 40 TABLET, DELAYED RELEASE ORAL at 07:32

## 2025-05-09 RX ADMIN — FAMOTIDINE 20 MG: 20 TABLET, FILM COATED ORAL at 07:32

## 2025-05-09 RX ADMIN — DEXAMETHASONE 4 MG: 4 TABLET ORAL at 07:32

## 2025-05-09 RX ADMIN — CYANOCOBALAMIN TAB 500 MCG 500 MCG: 500 TAB at 07:31

## 2025-05-09 ASSESSMENT — ACTIVITIES OF DAILY LIVING (ADL)
ADLS_ACUITY_SCORE: 35
ADLS_ACUITY_SCORE: 33
ADLS_ACUITY_SCORE: 33
ADLS_ACUITY_SCORE: 35
ADLS_ACUITY_SCORE: 33
ADLS_ACUITY_SCORE: 33

## 2025-05-09 NOTE — PLAN OF CARE
0700 - 2300    VSS. Pt denied pain/SOB/n/v. HD Cytarabine given in AM and PM without concern. Cerebellar assessments completed with no changes. Pt eager to discharge tomorrow. No concerns, continue POC.

## 2025-05-09 NOTE — PROGRESS NOTES
Labs and Transfusion orders:  Date: May 9, 2025    Patient: Alejandra Villegas  : 1965    Medication administration:  [ X ] Neulasta (pegfilgastim) injection 6 mg x1 subcutaneous on 2025.      Please call the [facility] at [phone] for any questions or critical results, and ask to speak with the care coordinator for Dr. Griffiths or Dr. Harris (patient's primary oncologist).    Thank you,           Laci Kerns PA-C    [facility]  Department of Hematology/Oncology  [address] [place] [address]  Pager: [phone]

## 2025-05-09 NOTE — PROGRESS NOTES
[id number]:  Pt sleeping overnight. VSS, on RA. Pt denies pain this shift. +bs and flatus, -n/v, voiding fine. Chemo came down around midnight, pt tolerated. Independent in room, tolerating. PICC WNL. No acute events this shift. Discharge today after chemo.     Marivel Willett RN on 5/9/2025 at 6:51 AM

## 2025-05-09 NOTE — PROGRESS NOTES
"Care Management Discharge Note    Discharge Date: 05/09/2025       Discharge Disposition: Home, Outpatient Infusion Services    Discharge Services: None    Discharge DME: None    Discharge Transportation: sister    Private pay costs discussed: Not applicable    Does the patient's insurance plan have a 3 day qualifying hospital stay waiver?  No    PAS Confirmation Code:  NA  Patient/family educated on Medicare website which has current facility and service quality ratings: no    Education Provided on the Discharge Plan: Yes  Persons Notified of Discharge Plans: patient  Patient/Family in Agreement with the Plan: yes    Handoff Referral Completed: YES IHO    Additional Information:  RNCC called and spoke with Steffany at [facility]. Obtained fax number/. RNCC sent stat fax @[phone] to Infusion center. Patient has appt held for 9AM on Monday 5/12.   Anticipate discharge home today after chemo.  Met with patient at bedside. Patient stated her sister and niece are on their way and should be \"here soon\". Patient notified of her Monday appt 5/12 at [facility] for Neulasta at 9 AM and labs.   Patient stated she's hoping to go outside for a few minutes after chemo and \"relax\". Patient stated she is getting 30 days of meds filled at [facility] pharmacy since she is in between PCPs. Patient stated she is in the process of establishing a new PCP as Dr Fry is no longer at clinic. OP coordinator is assisting with this. RNCC validated importance of having PCP.  -RNCC unable to complete hand off due to no PCP.    1050: RNCC called [facility] RN line and left VM to confirm receipt of Neulasta script.      Future Appointments   Date Time Provider Department Center   5/12/2025  9:00 AM GH INFUSION CHAIR 5 [facility] [place]   5/12/2025  1:10 PM GH LAB [facility] [place]   5/14/2025 10:30 AM Manuel Griffiths MD [facility]   5/15/2025  1:00 PM GH LAB [facility] [place]   5/19/2025  1:00 PM GH LAB [facility] [place] "   5/22/2025 12:50 PM GH LAB [facility] [place]   5/29/2025  1:00 PM GH LAB [facility] [place]   6/2/2025  1:00 PM GH LAB [facility] [place]   6/5/2025 12:50 PM GH LAB [facility] [place]   6/20/2025  1:30 PM Samina Harris MD [facility] [place]     [facility] (labs & Neulasta injection)  Phone:  [phone] (ask for the Infusion Center)  Phone:  [phone], Infusion Center Nurse (Steffany)  FAX: [phone]    Sister and niece to transport.    Ngoc Merino RNCC Float  5/9/2025  Nurse Coordinator    Covering for 5A  Phone [phone]    Social Work and Care Management Department   SEARCHABLE in [employer] - search CARE COORDINATOR   [employer] ([phone]) Saturday & Sunday; ([phone]) [facility] Recognized Holidays   Units: 5A Onc 5201 - 5219 RNCC,  5A Onc 5220 thru 5240 RNCC, 5C OFFSERVICE [phone] RNCC & 5C OFF SERVICE [phone] RNCC   Units: 6B Vocera, 6C Card 6401 thru 6420 RNCC, 6C Card 6502 thru 6514 RNCC & 6C Card 6515 thru 6519 RNCC    Units: 7A SOT RNCC Vocera, 7B Med Surg Vocera, 7C Med Surg 7401 thru 7418 RNCC & 7C Med Surg 7502 thru 7521 RNCC   Units: 6A Vocera & 4A CVICU Vocera, 4C MICU Vocera, and 4E SICU Vocera     Units: 5 Ortho Vocera & 5 Med Surg Vocera    Units: 6 Med Surg Vocera & 8 Med Surg Vocera        Ngoc Merino

## 2025-05-09 NOTE — PROGRESS NOTES
Nursing Focus: Chemotherapy    D: Positive blood return via red lumen PICC. Insertion site is clean/dry/intact, dressing intact with no complaints of pain. Pre infusion assessment documented in Flowsheet (if applicable)    I: Premedications given per order (see electronic medical administration record). Dose 6 of cytarabine started to infuse over 3 hours. Reviewed patient teaching on chemotherapy side effects. Patient denies need for further teaching. Chemotherapy double checked per protocol by two chemotherapy competent RNs    A: Tolerating infusion well. Denies nausea and or pain    P: Continue to monitor urine output and symptoms of nausea. Screen for symptoms of toxicity

## 2025-05-09 NOTE — DISCHARGE SUMMARY
[facility]    Discharge Summary  Hematology / Oncology    Date of Admission:  5/6/2025  Date of Discharge:  5/9/2025 12:49 PM  Discharging Provider: Laci DAVILA + Dr. Hwang  Date of Service (when I saw the patient): 05/09/25    Discharge Diagnoses   # AML with NPM1 mutation  # Risk for pancytopenia due to chemotherapy  # Risk for TLS   # ID prophylaxis   # GERD, improved  # Epigastric pain, resolved  # Nausea/vomiting, improved   # COPD   # Essential hypertension   # Infrarenal abdominal aortic aneurysm   # Stage 2 CKD   # Tobacco use disorder     History of Present Illness   Alejandra Villegas is a 59 year old female with a past medical history significant for COPD, migraines, rheumatoid arthritis, aortic stenosis, Afib, anxiety, and AML with NPM1, IDH2, and NRAS mutations. She was admitted for C2 consolidation with HiDAC (C2D1=5/6/2025). Tolerated this cycle of chemotherapy well without complications and medically stable for discharge 5/9/2025 with close outpatient follow up as detailed below.    On the day of discharge, patient is feeling well at baseline health and looking forward to going home. She is seen ambulating with a steady gait.     Prior to discharge, I reviewed with Alejandra the plan of care, including upcoming follow-up appointments and new medications. Appropriate prescriptions were sent to the discharge pharmacy, as needed. We reviewed strict discharge precautions, including reasons to call clinic triage or present to the ED, and she voiced understanding. She was provided with the clinic triage number, as well as written discharge instructions, in their discharge paperwork. Patient had an opportunity to ask questions, all of which were answered to their stated satisfaction. On the day of discharge, patient was overall well-appearing, hemodynamically stable, and felt safe and comfortable with the plans for discharge to home with follow-up as  described.    Outpatient follow-up issues:  - Advised to start ppx on discharge, please follow counts and advise patient on when appropriate to discontinue.  - Encouraged patient to establish with PCP for further medication and chronic disease management.  - Patient would like to discuss port-a-cath placement with OP oncologist at upcoming appointment.     Discharge medications:  New/changed:  - Prednisolone eyedrops  - Resumed prophylaxis with Levaquin and posaconazole on discharge, to continue until ANC greater than 1     Refilled: Acetaminophen, acyclovir, albuterol inhaler and neb solution, famotidine, gabapentin, ondansetron, pantoprazole, paroxetine, Compazine, rosuvastatin, sucralfate, Imitrex, vitamin D    Remainder of medications continued as PTA     Upcoming follow-up:  - 5/12: labs, Neulasta at local clinic; to continue 2x weekly labs/PRN transfusions thereafter  - 5/14: Establish with Dr. Griffiths (to co-manage AML with Dr. Harris - local oncologist)      Hospital Course   Alejandra Villegas was admitted on 5/6/2025.  The following problems were addressed during her hospitalization:    HEME  # AML with NPM1 mutation  Had been seen by PCP Dec 2024 w/ progressive fatigue and nausea where she was found leukopenic WBC  2.4 and neutropenic w/ ANC 0.3. Hgb 12. Was referred to local hematology/oncology clinic for further evaluation and seen by Dr. Samina Harris. BMBx 2/10/25 done at OSH showed hypercellular marrow w/ trilineage hematopoiesis w/ dyspoiesis with mildly elevated blasts of 4% on morphology. NGS w/ NPM1, IDH2, and NRAS mutations. She was admitted to [facility] 2/26/25 for further workup and management. Slides were re-reviewed by [facility] Hematopathology, who noted hypercellular marrow with 8% blasts by morphology. Despite relatively low blast percentage, findings were felt most consistent with a diagnosis AML with NPM1 mutation by WHO 2022 (which does not require a specific blast threshold). Following  interdepartmental discussions and review of the available literature, patient was started on intensive induction with 7+3 (Day 1=3/5/25). Midcycle BMBx 3/19/25 with no morphologic or immunophenotypic evidence of AML. D28 BMBx completed 3/31/25 with hypercellular marrow (60 to 70%) with no overt dysplasia or increase in blasts, noted flow with 4.3% blasts of unusual phenotype. Due to overall disposition timeline as well as patient living in [facility], preceded directly with consolidation chemotherapy with HiDAC (C1D1=4/3/25) which she tolerated well. Now, s/p C2 HiDAC (C2D1 on 5/6/2025) which she tolerated without any complications.  - PICC placed 5/6/2025, removed prior to discharge  - Baseline cardiopulmonary studies:  - Echo w/ EF 60-65%, mild known aortic stenosis.  - EKG (5/6) with NSR, QTc 487  - CXR (4/27) Small area of new infiltrate versus atelectasis left lung base. No consolidation. R lung clear.  - Baseline viral serologies: CMV IgG+, EBV IgG+, HepB sAg-, HepB cAb-, HepB sAb-, HSV1+/2+, HIV-  - HLA Typing sent on 3/3/2025 and 4/25/2025. BMT Consult on 4/16, no immediate plans for bone marrow transplant.     Treatment plan: HiDAC (C2D1= 5/6/2025)   - Cytarabine 3 g/m  IV q12h D1-3   - Neulasta 6 mg D5 - scheduled at [facility] Monday, 5/12  Supportive medications: Zofran 8 mg q12h, Dexamethasone 4 mg q12h D1-3, Prednisolone eye drops QID D1-5     # Risk for pancytopenia due to chemotherapy  Secondary to underlying hematologic malignancy and exacerbated by recent chemotherapy. Recovered.  - Follow CBC's OP  - Transfuse to keep Hgb >7, plt >10K     # Risk for TLS  Uric acid 6.1 and Phos 5.1 on 5/7; Potassium WNL at 4.8. Was on allopurinol 300 mg x1 day and Phoslo 667 mg TID x3 doses 5/7/2025. Phos and uric subsequently improved.   - Low risk for TLS given AML is likely in remission with ongoing consolidation therapy. Stopped allopurinol.      ID   # ID prophylaxis  - Acyclovir 400 mg BID  -  Levaquin 250 mg daily - resumed on discharge  - Posaconazole 300 mg daily - resumed on discharge  - Note: Posaconazole copay $1.60. Trialed initially on vori, then rotated to posa x3/18 given visual hallucinations     GI  # GERD, improved  # Epigastric pain, resolved  Reported recent increase in symptoms in the days leading up to her previous admission for C1 of consolidation and initiated famotidine; she had persistent/progressive symptoms throughout that admission. CT C/A/P 4/14/2025 with evidence of esophagitis (thought due to reflux/recent chemotherapy) and small hiatal hernia. Symptoms have historically improved on maximal medical therapy as below.  - Continue famotidine 20 mg BID  - Continue Protonix 40 mg BID  - Continue GI cocktail PRN  - Continue TUMS PRN  - Continued to encourage lifestyle modifications: avoid straws, carbonated beverages, GERD precautions     # Nausea/vomiting, improved  Patient noted ongoing nausea w/ occasional vomiting starting Dec 2024. Has been managed with PRN Zofran. Now resolved.  - Continue PRN Zofran and compazine     PULM  # COPD  Longstanding history of COPD. On admission patient reports symptoms are manageable with no recent exacerbations. Most recent PFTs (2018) were normal.  - Continue PTA Breo Ellipta inhaler and PRN DuoNebs   - Encouraged smoking cessation     CV  # Essential hypertension  - Stopped lisinopril 10 mg due to well managed blood pressures while inpatient     # Infrarenal abdominal aortic aneurysm  Noted incidentally on CT C/A/P (3/19/25), measuring 3.4 cm in diameter.  - Attention on follow-up imaging     RENAL/FEN  # Stage 2 CKD  Baseline Cr ~ 0.7. On admission Cr 0.76     NEURO  # Chronic migraine w/o aura  # Chronic headaches  Present since childhood. Reportedly triggered by neck manipulation/movement. Reportedly well-managed with regimen below. Headaches occurring throughout admission with daily APAP use, also chronic and managed with APAP PRN at home.  Patient notes that her headaches throughout prior hospitalizations have been consistent with her typical daily headaches with no reported changes in character, quality, location, severity, or frequency. She denies new associated neurological changes. May consider LP to exclude CNS leukemia, but given this reassuring history and absence of other indications for LP (no hyperleukocytosis, no monocytic phenotype, no FLT3 mutation, etc), this was historically deferred.  - Continue APAP PRN  - Continue PTA sumatriptan and cyclobenzaprine PRN  - Could reconsider need for LP if headache worsens/changes in character or quality in the future     MISC   # BROOKS  # MDD  # At risk for adjustment disorder  Patient reports good control of anxiety/depressive symptoms prior to admission. Did note feeling overwhelmed by new diagnosis at her last admission and was offered health psychology or cordelia at that time, which she declined.  - Readdress health psych consult as indicated  - Continue PTA paroxetine   - Continue Atarax as needed     # Tobacco use disorder  Has smoked since age 16, 1.5 ppd (roughly 65 pack years). Attempting to cut back as recently as 01/2025 to 0.5 ppd. She  was educated on the risks of smoking during induction chemotherapy including increased risk for infection in setting of severe neutropenia that could further complicate course, placing her at risk for severe complications that could be life-threatening or even fatal. Patient is trying to cut down on her cigarette use independently; declines any nicotine replacement or pharmacologic assistance at this time.  - Encourage smoking cessation; patient continues to smoke at this time, despite understanding/acknowledgement of the risks. Trying to cut down, however.   - Trialed nicotine patch, then self-discontinued after reporting that she felt the nicotine patch precipitated an anxiety attack/episode of chest pain on 3/9. Has been offered a lower dose versus  "alternative form of NRT, which patient declined.  - Continue posaconazole ppx, regardless of ANC, given high risk for fungal pulmonary infection in the setting of ongoing tobacco abuse.     # Degenerative disc disease  Appears likely multi-level; no MRI available for review, though note that patient has previously had epidural injections at L3-4 and L5-S1 (2016). Reported taking gabapentin 400 mg TID PRN prior to admission. Mild exacerbation noted 3/29; improved with resumption of gabapentin.  - Continue gabapentin 400 mg TID     Chronic Problems:  # Vitamin D deficiency - Continue PTA vit D supplement  # Seasonal allergies - Claritin 10 mg daily  # Rheumatoid arthritis - Patient reported trying treatment though was unable to tolerate well. Managed with Tylenol PRN. Most recent RF >650 (2/10/25). JI negative.    # Prediabetes - Last A1c 6.0 x12/9/24. Has been managing with lifestyle modifications.   # Mixed hyperlipidemia - Lipid panel has remained at goal, 1/14/25 panel w/ cholesterol 163, , LDL 92, HDL 45. - Held PTA atorvastatin on admission; resumed on discharge  # H/o aortic stenosis - Patient noted on admission longstanding history of aortic stenosis. Pre-chemo echo w/ EF 60-65% and mild aortic stenosis and insufficiency. No previous echo to compare.    Clinically Significant Risk Factors                   # Hypertension: Noted on problem list            # Overweight: Estimated body mass index is 28.33 kg/m  as calculated from the following:    Height as of this encounter: 1.676 m (5' 6\").    Weight as of this encounter: 79.6 kg (175 lb 8 oz)., PRESENT ON ADMISSION     # Financial/Environmental Concerns: none  # Asthma: noted on problem list         Patient was staffed with Dr. Jaiden Kerns PA-C  Hematology/Oncology  Page: #[phone]    60 MINUTES SPENT BY ME on the date of service doing chart review, history, exam, documentation & further activities per the note.       Code Status   Full " "Code    Primary Care Physician   Ang Gilman    /86 (BP Location: Left arm)   Pulse 79   Temp 97.5  F (36.4  C) (Oral)   Resp 18   Ht 1.676 m (5' 6\")   Wt 79.6 kg (175 lb 8 oz)   LMP 01/01/2007 (Approximate)   SpO2 94%   BMI 28.33 kg/m      Constitutional: Awake and conversational. Non-toxic appearing. No acute distress.  HEENT: Normocephalic, atraumatic. Sclerae anicteric. PERRLA. EOM intact. Moist mucus membranes without lesion or abscess. Firm bump posterior to right ear. No erythema or discharge around or in right ear visualized.   Respiratory: Breathing comfortably on room air with no accessory muscle use. Speaking in full sentences, no evidence of respiratory distress. Lungs CTAB, no wheeze or rales.   Cardiovascular: Regular rate and rhythm. S1, S2. No murmurs appreciated.  Circulatory:  No peripheral edema.    GI: Abdomen with normoactive bowel sounds, soft, non-distended, and non-tender throughout. No rebound or guarding.  Skin: Skin is clean, dry, intact. No jaundice or significant rashes appreciated on visible skin exam.   Musculoskeletal/ Extremities: No redness, warmth, or swelling of the joints appreciated.  Neurologic: Alert and oriented with normal speech. Grossly nonfocal exam. Moves all extremities equally and spontaneously.   Neuropsychiatric: Calm, appropriate mood and affect congruent to situation. Good judgment and insight.   Vascular access: PICC on RUE CDI. (Removed at discharge)      Time Spent on this Encounter   I, Laci Kerns PA-C, personally saw the patient today and spent greater than 30 minutes discharging this patient.    Discharge Disposition   Discharged to home  Condition at discharge: Stable    Consultations This Hospital Stay   NURSING TO CONSULT FOR VIRTUAL CARE IP  VASCULAR ACCESS FOR PICC PLACEMENT ADULT IP CONSULT  CARE MANAGEMENT / SOCIAL WORK IP CONSULT    Discharge Orders      Reason for your hospital stay    You were in the hospital to receive Cycle 2 " of high-dose cytarabine for AML consolidation.     Activity    Your activity upon discharge: activity as tolerated     ADULT [facility] Specialty Follow-up and recommended labs and tests    5/12: Lab/infusion appointment in [facility] for Neulasta injection    5/14: Appointment with Dr. Griffiths in [place]    5/15, 5/19, 5/22: Lab appointments in [facility]    Appointments on [place] and/or [facility] (with [facility] or [facility] provider or service). Call [phone] if you haven't heard regarding these appointments within 7 days of discharge.     Diet    Follow this diet upon discharge: Current Diet:Orders Placed This Encounter      Regular Diet Adult     Infusion Appointment Request - Adult     Discharge Medications   Discharge Medication List as of 5/9/2025 11:14 AM        START taking these medications    Details   prednisoLONE acetate (PRED FORTE) 1 % ophthalmic suspension Place 2 drops into both eyes 4 times daily., Disp-5 mL, R-0, E-PrescribeContinue for 2 days after chemotherapy (on 5/9, 5/10, and 5/11)      Vitamin D3 (CHOLECALCIFEROL) 25 mcg (1000 units) tablet Take 1 tablet (25 mcg) by mouth daily., Disp-30 tablet, R-0, E-Prescribe      albuterol (PROVENTIL) (2.5 MG/3ML) 0.083% neb solution Take 1 vial (2.5 mg) by nebulization once as needed (Refractory bronchospasm associated with hypersensitivity reaction)., Disp-90 mL, R-0, E-Prescribe           CONTINUE these medications which have CHANGED    Details   acetaminophen (TYLENOL) 325 MG tablet Take 2 tablets (650 mg) by mouth every 4 hours as needed for mild pain., Disp-90 tablet, R-0, E-Prescribe      acyclovir (ZOVIRAX) 400 MG tablet Take 1 tablet (400 mg) by mouth 2 times daily., Disp-60 tablet, R-0, E-Prescribe      famotidine (PEPCID) 20 MG tablet Take 1 tablet (20 mg) by mouth 2 times daily., Disp-60 tablet, R-0, E-Prescribe      gabapentin (NEURONTIN) 400 MG capsule Take 1 capsule (400 mg) by mouth 3 times daily., Disp-90 capsule, R-0,  E-Prescribe      levofloxacin (LEVAQUIN) 250 MG tablet Take 1 tablet (250 mg) by mouth daily. Start on hospital discharge; continue through soo (low point in blood counts) until ANC >1.0, or as otherwise instructed by your oncology team., Disp-30 tablet, R-0, E-Prescribe      ondansetron (ZOFRAN ODT) 4 MG ODT tab Take 1-2 tablets (4-8 mg) by mouth every 8 hours as needed for nausea or vomiting., Disp-45 tablet, R-0, E-Prescribe      pantoprazole (PROTONIX) 40 MG EC tablet Take 1 tablet (40 mg) by mouth 2 times daily (before meals)., Disp-60 tablet, R-0, E-Prescribe      PARoxetine (PAXIL) 20 MG tablet Take 1 tablet (20 mg) by mouth every morning., Disp-30 tablet, R-0, E-Prescribe      posaconazole (NOXAFIL) 100 MG DR tablet Take 3 tablets (300 mg) by mouth every morning., Disp-90 tablet, R-0, E-Prescribe      prochlorperazine (COMPAZINE) 5 MG tablet Take 1 tablet (5 mg) by mouth every 6 hours as needed for nausea or vomiting., Disp-30 tablet, R-0, E-Prescribe      rosuvastatin (CRESTOR) 20 MG tablet Take 1 tablet (20 mg) by mouth daily., Disp-30 tablet, R-0, E-Prescribe      sucralfate (CARAFATE) 1 GM/10ML suspension Take 10 mLs (1 g) by mouth 4 times daily (before meals and nightly)., Disp-473 mL, R-0, E-Prescribe      SUMAtriptan (IMITREX) 50 MG tablet Take 1 tablet (50 mg) by mouth at onset of headache for migraine., Disp-30 tablet, R-0, E-Prescribe      albuterol (PROAIR HFA/PROVENTIL HFA/VENTOLIN HFA) 108 (90 Base) MCG/ACT inhaler Inhale 1-2 puffs into the lungs once as needed (Refractory bronchospasm associated with hypersensitivity reaction)., Disp-18 g, R-0, E-PrescribePharmacy may dispense brand covered by insurance (Proair, or proventil or ventolin or generic albuterol inhal er)      ipratropium - albuterol 0.5 mg/2.5 mg/3 mL (DUONEB) 0.5-2.5 (3) MG/3ML neb solution Take 1 vial (3 mLs) by nebulization every 4 hours as needed for shortness of breath, wheezing or cough., Disp-90 mL, R-0, E-Prescribe            CONTINUE these medications which have NOT CHANGED    Details   budesonide-formoterol (SYMBICORT) 80-4.5 MCG/ACT Inhaler Inhale 2 puffs into the lungs 2 times daily - Rinse mouth well after use to prevent Thrush, Disp-10.2 g, R-11, E-Prescribe      artificial saliva (BIOTENE MT) AERS spray Take 1 spray by mouth every 6 hours as needed for dry mouth., Disp-100 mL, R-1, E-Prescribe      calcium carbonate (TUMS) 500 MG chewable tablet Take 1 tablet (500 mg) by mouth 3 times daily as needed for heartburn., Disp-90 tablet, R-0, E-Prescribe      cyanocobalamin (VITAMIN B-12) 500 MCG tablet Take 500 mcg by mouth daily., Historical      cyclobenzaprine (FLEXERIL) 10 MG tablet Take 1 tablet (10 mg) by mouth 3 times daily as needed for muscle spasms., Disp-15 tablet, R-0, InstyMeds      fluticasone (FLONASE) 50 MCG/ACT nasal spray Spray 2 sprays into both nostrils 2 times daily., Disp-15.8 mL, R-0, E-PrescribeQS, 1 bottle      guaiFENesin (MUCINEX) 600 MG 12 hr tablet Take 2 tablets (1,200 mg) by mouth 2 times daily., Disp-30 tablet, R-0, E-Prescribe      hydrOXYzine HCl (ATARAX) 25 MG tablet Take 1-2 tablets (25-50 mg) by mouth every 6 hours as needed for anxiety or itching (adjuvant pain, sleep)., Disp-90 tablet, R-3, E-Prescribe      loratadine (CLARITIN) 10 MG tablet Take 1 tablet (10 mg) by mouth daily., Disp-30 tablet, R-0, E-Prescribe      order for DME Equipment being ordered: home neb set up with mask and tubingDisp-1 Device, R-0, Local Print           STOP taking these medications       potassium chloride ER (K-TAB) 20 MEQ CR tablet Comments:   Reason for Stopping:         Vitamin D, Cholecalciferol, 25 MCG (1000 UT) CAPS Comments:   Reason for Stopping:             Allergies   Allergies   Allergen Reactions    Adhesive Tape     Bee Pollen Swelling     Seasonal    Bee Venom Unknown    Folic Acid Itching    Pollen Extract      Seasonal    Amoxicillin Rash    Chlorhexidine Rash     After several weeks, started to  develop rash on R neck down to chest and arm. Also noted on back of knees. Providers suggesting related to CHG as nothing else is new for pt.     Liquid Adhesive Rash    Meloxicam Rash    Nabumetone GI Disturbance     GI upset     Data   Most Recent 3 CBC's:  Recent Labs   Lab Test 05/09/25  0523 05/08/25  0547 05/07/25  0532   WBC 7.0 7.8 7.1   HGB 9.0* 8.9* 10.0*   MCV 95 95 97    292 327      Most Recent 3 BMP's:  Recent Labs   Lab Test 05/09/25  0523 05/08/25  0547 05/07/25  0532    139 139   POTASSIUM 4.2 4.9 4.8   CHLORIDE 100 101 103   CO2 26 27 24   BUN 25.6* 19.5 13.4   CR 0.66 0.62 0.69   ANIONGAP 11 11 12   TOBIN 9.5 9.6 10.0   * 141* 160*     Most Recent 2 LFT's:  Recent Labs   Lab Test 05/09/25  0523 05/08/25  0547   AST 19 18   ALT 15 14   ALKPHOS 120 126   BILITOTAL 0.5 0.3     Most Recent INR's and Anticoagulation Dosing History:  Anticoagulation Dose History  More data exists         Latest Ref Rng & Units 3/31/2025 4/2/2025 4/4/2025 5/6/2025 5/7/2025 5/8/2025 5/9/2025   Recent Dosing and Labs   INR 0.85 - 1.15 1.14  1.12  1.11  1.11  1.10  1.08  1.07      Most Recent 3 Troponin's:No lab results found.  Most Recent Cholesterol Panel:  Recent Labs   Lab Test 01/14/25  1052   CHOL 163   LDL 92   HDL 45*   TRIG 130     Most Recent 6 Bacteria Isolates From Any Culture (See EPIC Reports for Culture Details):No lab results found.  Most Recent TSH, T4 and A1c Labs:  Recent Labs   Lab Test 12/09/24  1513   A1C 6.0*     Results for orders placed or performed during the hospital encounter of 04/27/25   XR Chest 2 Views    Narrative    EXAM: XR CHEST 2 VIEWS  LOCATION: [facility]  DATE: 4/27/2025    INDICATION: Fever, cough, immunocompromised.  COMPARISON: CT 4/14/2025.      Impression    IMPRESSION: Small area of new infiltrate versus atelectasis left lung base. No consolidation. Right lung is clear. Normal heart size. Mild pulmonary venous hypertension. No pleural  fluid. Aortic calcification. Osseous structures unremarkable.

## 2025-05-09 NOTE — PROGRESS NOTES
Nursing Focus: Chemotherapy  D: Positive blood return via PICC . Insertion site is clean/dry/intact, dressing intact with no complaints of pain.  Pre infusion assessment documented in Flowsheet (if applicable).    I: Premedications given per order (see electronic medical administration record). Dose #4 of HD Cytarabine started to infuse over 3 hours. Reviewed pt teaching on chemotherapy side effects.  Pt denies need for further teaching. Chemotherapy double checked per protocol by two chemotherapy competent RN's.   A: Tolerating infusion well. Denies nausea and or pain.   P: Continue to monitor urine output and symptoms of nausea. Screen for symptoms of toxicity.

## 2025-05-09 NOTE — PLAN OF CARE
2031-0525  Goal Outcome Evaluation:    Plan of Care Reviewed With: patient. Overall Patient Progress: improving. Outcome Evaluation: Ready for discharge, chemo infused w/o complications.    Vitals stable. Denies pain/headache. No nausea/sob. Ind, ambulating frequently. BM this morning. Last dose cytarabine infused this morning, clear to discharge after. Cerebellar intact. PICC pulled. Plan to follow up in clinic Monday for Neulasta injection.         Nursing Focus: Discharge    D: Patient discharged to home at noon. Patient Alejandra and accompanied by sister.    I: Discharge prescriptions sent to discharge pharmacy to be filled. All discharge medications and instructions reviewed with Pt by bedside RN. Patient instructed to call clinic triage nurse if she experiences a fever >100.4, uncontrolled nausea, vomiting, diarrhea, or pain; or experiences any signs or symptoms of bleeding. Other phone numbers to call with questions or concerns after discharge reviewed. PICC removed. Education completed.    A: Alejandra verbalized understanding of discharge medications and instructions. Patient will  medications at discharge pharmacy. Yes.     P: Patient to follow-up in clinic on Monday at Clinic.

## 2025-05-12 ENCOUNTER — HOSPITAL ENCOUNTER (OUTPATIENT)
Dept: INFUSION THERAPY | Facility: OTHER | Age: 60
Discharge: HOME | End: 2025-05-12
Attending: INTERNAL MEDICINE
Payer: MEDICARE

## 2025-05-12 ENCOUNTER — PATIENT OUTREACH (OUTPATIENT)
Dept: CARE COORDINATION | Facility: CLINIC | Age: 60
End: 2025-05-12

## 2025-05-12 ENCOUNTER — LAB (OUTPATIENT)
Dept: LAB | Facility: OTHER | Age: 60
End: 2025-05-12
Attending: INTERNAL MEDICINE
Payer: MEDICARE

## 2025-05-12 DIAGNOSIS — C92.00 ACUTE MYELOID LEUKEMIA NOT HAVING ACHIEVED REMISSION (H): Primary | ICD-10-CM

## 2025-05-12 DIAGNOSIS — C92.00 ACUTE MYELOID LEUKEMIA NOT HAVING ACHIEVED REMISSION (H): ICD-10-CM

## 2025-05-12 LAB
ALBUMIN SERPL BCG-MCNC: 3.8 G/DL (ref 3.5–5.2)
ALP SERPL-CCNC: 123 U/L (ref 40–150)
ALT SERPL W P-5'-P-CCNC: 20 U/L (ref 0–50)
ANION GAP SERPL CALCULATED.3IONS-SCNC: 13 MMOL/L (ref 7–15)
AST SERPL W P-5'-P-CCNC: 25 U/L (ref 0–45)
BASOPHILS # BLD AUTO: 0 10E3/UL (ref 0–0.2)
BASOPHILS NFR BLD AUTO: 0 %
BILIRUB SERPL-MCNC: 0.5 MG/DL
BUN SERPL-MCNC: 23.5 MG/DL (ref 6–20)
CALCIUM SERPL-MCNC: 9.4 MG/DL (ref 8.8–10.4)
CHLORIDE SERPL-SCNC: 101 MMOL/L (ref 98–107)
CREAT SERPL-MCNC: 0.64 MG/DL (ref 0.51–0.95)
EGFRCR SERPLBLD CKD-EPI 2021: >90 ML/MIN/1.73M2
EOSINOPHIL # BLD AUTO: 0 10E3/UL (ref 0–0.7)
EOSINOPHIL NFR BLD AUTO: 0 %
ERYTHROCYTE [DISTWIDTH] IN BLOOD BY AUTOMATED COUNT: 17.1 % (ref 10–15)
GLUCOSE SERPL-MCNC: 102 MG/DL (ref 70–99)
HCO3 SERPL-SCNC: 25 MMOL/L (ref 22–29)
HCT VFR BLD AUTO: 27.8 % (ref 35–47)
HGB BLD-MCNC: 9.1 G/DL (ref 11.7–15.7)
IMM GRANULOCYTES # BLD: 0 10E3/UL
IMM GRANULOCYTES NFR BLD: 0 %
LYMPHOCYTES # BLD AUTO: 0.4 10E3/UL (ref 0.8–5.3)
LYMPHOCYTES NFR BLD AUTO: 13 %
MCH RBC QN AUTO: 30.5 PG (ref 26.5–33)
MCHC RBC AUTO-ENTMCNC: 32.7 G/DL (ref 31.5–36.5)
MCV RBC AUTO: 93 FL (ref 78–100)
MONOCYTES # BLD AUTO: 0 10E3/UL (ref 0–1.3)
MONOCYTES NFR BLD AUTO: 0 %
NEUTROPHILS # BLD AUTO: 2.4 10E3/UL (ref 1.6–8.3)
NEUTROPHILS NFR BLD AUTO: 86 %
NRBC # BLD AUTO: 0 10E3/UL
NRBC BLD AUTO-RTO: 0 /100
PLATELET # BLD AUTO: 210 10E3/UL (ref 150–450)
POTASSIUM SERPL-SCNC: 4.7 MMOL/L (ref 3.4–5.3)
PROT SERPL-MCNC: 6.9 G/DL (ref 6.4–8.3)
RBC # BLD AUTO: 2.98 10E6/UL (ref 3.8–5.2)
SODIUM SERPL-SCNC: 139 MMOL/L (ref 135–145)
WBC # BLD AUTO: 2.8 10E3/UL (ref 4–11)

## 2025-05-12 PROCEDURE — 82040 ASSAY OF SERUM ALBUMIN: CPT | Mod: ZL

## 2025-05-12 PROCEDURE — 36415 COLL VENOUS BLD VENIPUNCTURE: CPT | Mod: ZL

## 2025-05-12 PROCEDURE — 85004 AUTOMATED DIFF WBC COUNT: CPT | Mod: ZL

## 2025-05-12 PROCEDURE — 96372 THER/PROPH/DIAG INJ SC/IM: CPT | Performed by: PHYSICIAN ASSISTANT

## 2025-05-12 PROCEDURE — 250N000011 HC RX IP 250 OP 636: Mod: JZ | Performed by: PHYSICIAN ASSISTANT

## 2025-05-12 RX ADMIN — PEGFILGRASTIM 6 MG: 6 INJECTION SUBCUTANEOUS at 09:40

## 2025-05-12 NOTE — NURSING NOTE
Infusion Nursing Note:  Alejandra Villegas presents today for Neulasta.    Patient seen by provider today: No   present during visit today: Not Applicable.    Note: N/A.      Intravenous Access:  Labs in clinic by     Treatment Conditions:  Not Applicable.      Post Infusion Assessment:  Patient tolerated injection without incident right arm.       Discharge Plan:   Patient and/or family verbalized understanding of discharge instructions and all questions answered.  Patient discharged in stable condition accompanied by: self.  Departure Mode: Ambulatory.      Gena Torres RN

## 2025-05-14 ENCOUNTER — PATIENT OUTREACH (OUTPATIENT)
Dept: ONCOLOGY | Facility: CLINIC | Age: 60
End: 2025-05-14

## 2025-05-14 ENCOUNTER — PRE VISIT (OUTPATIENT)
Dept: ONCOLOGY | Facility: CLINIC | Age: 60
End: 2025-05-14
Payer: MEDICARE

## 2025-05-14 NOTE — PROGRESS NOTES
Clinic Care Coordination Contact    Situation: Patient chart reviewed by care coordinator.    Background: Patient has been hospitalized at [facility] recently, and is back there today for follow-up today per review of chart. She is working with oncology care coordinator for her plan there.     Plan/Recommendations: She will be at [facility] for infusion next week, 5/19 and 5/22. I will attempt to connect to see if any additional support is needed next week.   Consuelo Lozano RN on 5/14/2025 at 2:35 PM

## 2025-05-14 NOTE — PROGRESS NOTES
[facility]: Cancer Care Initial Note                                    Discussion with Patient:                                                      Met with Alejandra  after clinic visit/consultation appt with Dr. Griffiths.   Pt  verbalizes understanding of Dr. Griffiths's plan of care .     Introduced self and role of RN Care Coordinator at [facility].     Provided my contact information, [facility] phone number (which has options to talk with a Nurse available 24/7 - triage and RNCC via this option during business hours). Reviewed appropriate use of MyChart, not to be used for symptom reporting.   Reviewed [facility] care team members including midlevel providers in oncology dept and Dr. Griffiths usual clinic hours.     Provided overview of supportive services at [facility] including SW, oncology dietitian, oncology behavioural health providers (psychology, psychiatry, counseling), as well as the availability of [facility] as needed.     Patient  voiced understanding and appreciation of above information and denies any further questions, and he/she understands that I will follow and provide coordination as needed.             Assessment:                                                      Initial  Current living arrangement:: I live in a private home  Informal Support system:: Family  Bed or wheelchair confined:: No  Mobility Status: Independent  Transportation means:: Regular car    Plan of Care Education Review:   Assessment completed with:: Patient    Plan of Care Education   Yearly learning assessment completed?: Yes (see Education tab)  Diagnosis:: AML  Does patient understand diagnosis?: Yes  Tx plan/regimen:: HIDAC  Does patient understand treatment plan/regimen?: Yes  Preparing for treatment:: Reviewed treatment preparation information with patient (vascular access, day of chemo, visitor policy, what to bring, etc.)  Safety/self care at home reviewed with patient::  Yes  Coping - concerns/fears reviewed with patient:: Yes  Plan of Care:: Treatment schedule, Lab appointment  When to call provider:: Bleeding, Increased shortness of breath, Uncontrolled diarrhea/constipation, Uncontrolled nausea/vomiting, New/worsening pain, Shaking chills, Temperature >100.4F  Reasons for deferring treatment reviewed with patient:: Yes  Additional education provided for: : Bleeding precautions, Neutropenic precautions  Procedure education provided for: : Other (comment) (Transfusions plans)    Evaluation of Learning  Patient Education Provided: Yes  Readiness:: Acceptance  Method:: Explanation  Response:: Verbalizes understanding           Intervention/Education provided during outreach:                                                       Spoke with patient regarding the next steps. Reviewed when to call triage and triage phone number. Reviewed  not using RNCC voicemail or my chart for any urgent items. Patient stated an understanding of this information and did not have any other questions.        Patient to follow up as scheduled at next appt  Patient to call/[employer] message with updates    Signature:  Nikki Bray RN

## 2025-05-15 ENCOUNTER — NURSE TRIAGE (OUTPATIENT)
Facility: CLINIC | Age: 60
End: 2025-05-15

## 2025-05-15 ENCOUNTER — PATIENT OUTREACH (OUTPATIENT)
Dept: ONCOLOGY | Facility: OTHER | Age: 60
End: 2025-05-15

## 2025-05-15 ENCOUNTER — LAB (OUTPATIENT)
Dept: LAB | Facility: OTHER | Age: 60
End: 2025-05-15
Attending: INTERNAL MEDICINE
Payer: MEDICARE

## 2025-05-15 DIAGNOSIS — C92.00 ACUTE MYELOID LEUKEMIA NOT HAVING ACHIEVED REMISSION (H): Primary | ICD-10-CM

## 2025-05-15 DIAGNOSIS — C92.00 ACUTE MYELOID LEUKEMIA NOT HAVING ACHIEVED REMISSION (H): ICD-10-CM

## 2025-05-15 LAB
ALBUMIN SERPL BCG-MCNC: 3.9 G/DL (ref 3.5–5.2)
ALP SERPL-CCNC: 124 U/L (ref 40–150)
ALT SERPL W P-5'-P-CCNC: 19 U/L (ref 0–50)
ANION GAP SERPL CALCULATED.3IONS-SCNC: 15 MMOL/L (ref 7–15)
AST SERPL W P-5'-P-CCNC: 18 U/L (ref 0–45)
BILIRUB SERPL-MCNC: 0.8 MG/DL
BUN SERPL-MCNC: 16.2 MG/DL (ref 6–20)
CALCIUM SERPL-MCNC: 9.3 MG/DL (ref 8.8–10.4)
CHLORIDE SERPL-SCNC: 102 MMOL/L (ref 98–107)
CREAT SERPL-MCNC: 0.6 MG/DL (ref 0.51–0.95)
EGFRCR SERPLBLD CKD-EPI 2021: >90 ML/MIN/1.73M2
ERYTHROCYTE [DISTWIDTH] IN BLOOD BY AUTOMATED COUNT: 16.4 % (ref 10–15)
GLUCOSE SERPL-MCNC: 141 MG/DL (ref 70–99)
HCO3 SERPL-SCNC: 22 MMOL/L (ref 22–29)
HCT VFR BLD AUTO: 23.5 % (ref 35–47)
HGB BLD-MCNC: 7.7 G/DL (ref 11.7–15.7)
MAGNESIUM SERPL-MCNC: 1.9 MG/DL (ref 1.7–2.3)
MCH RBC QN AUTO: 30 PG (ref 26.5–33)
MCHC RBC AUTO-ENTMCNC: 32.8 G/DL (ref 31.5–36.5)
MCV RBC AUTO: 91 FL (ref 78–100)
PHOSPHATE SERPL-MCNC: 3.6 MG/DL (ref 2.5–4.5)
PLAT MORPH BLD: NORMAL
PLATELET # BLD AUTO: 61 10E3/UL (ref 150–450)
POTASSIUM SERPL-SCNC: 4 MMOL/L (ref 3.4–5.3)
PROT SERPL-MCNC: 6.9 G/DL (ref 6.4–8.3)
RBC # BLD AUTO: 2.57 10E6/UL (ref 3.8–5.2)
RBC MORPH BLD: NORMAL
SODIUM SERPL-SCNC: 139 MMOL/L (ref 135–145)
WBC # BLD AUTO: 0.3 10E3/UL (ref 4–11)

## 2025-05-15 PROCEDURE — 82435 ASSAY OF BLOOD CHLORIDE: CPT | Mod: ZL

## 2025-05-15 PROCEDURE — 85018 HEMOGLOBIN: CPT | Mod: ZL

## 2025-05-15 PROCEDURE — 84100 ASSAY OF PHOSPHORUS: CPT | Mod: ZL

## 2025-05-15 PROCEDURE — 36415 COLL VENOUS BLD VENIPUNCTURE: CPT | Mod: ZL

## 2025-05-15 PROCEDURE — 83735 ASSAY OF MAGNESIUM: CPT | Mod: ZL

## 2025-05-15 NOTE — PROGRESS NOTES
Per Dr. Kimberly Roberts will need labs three times weekly. Oncology PASR's notified.  iLsha Winkler RN...........5/15/2025 1:55 PM

## 2025-05-15 NOTE — PROGRESS NOTES
[facility]: Cancer Care Follow-Up Note                                    Discussion with Patient:                                                      Alejandra is doing well. She does get shaky occasionally. Her last consolidation treatment at the [facility] was 5/12. She will go back down for Cycle 3 5/31. She reports that she was told she should plan on 4 cycles, but may not need the fourth cycle.          Dates of Treatment:                                                      Infusion given in last 28 days       Administered MAR Action Medication Dose Rate Visit    05/06/2025 20:34 New Bag cytarabine (PF) (CYTOSAR) 5,730 mg in D5W 332.3 mL infusion 5,730 mg 110.8 mL/hr Admission (Discharged) on 05/06/2025 in [address] Oncology    05/07/2025 09:34 New Bag cytarabine (PF) (CYTOSAR) 5,730 mg in D5W 332.3 mL infusion 5,730 mg 110.8 mL/hr Admission (Discharged) on 05/06/2025 in [address] Oncology    05/07/2025 20:32 New Bag cytarabine (PF) (CYTOSAR) 5,730 mg in D5W 332.3 mL infusion 5,730 mg 110.8 mL/hr Admission (Discharged) on 05/06/2025 in [address] Oncology    05/08/2025 10:15 New Bag cytarabine (PF) (CYTOSAR) 5,730 mg in D5W 332.3 mL infusion 5,730 mg 110.8 mL/hr Admission (Discharged) on 05/06/2025 in [address] Oncology    05/08/2025 20:58 New Bag cytarabine (PF) (CYTOSAR) 5,730 mg in D5W 332.3 mL infusion 5,730 mg 110.8 mL/hr Admission (Discharged) on 05/06/2025 in [address] Oncology    05/09/2025 08:14 New Bag cytarabine (PF) (CYTOSAR) 5,730 mg in D5W 332.3 mL infusion 5,730 mg 110.8 mL/hr Admission (Discharged) on 05/06/2025 in [address] Oncology            Assessment:                                                          RNCC Short Symptom Review:     Assessment completed with:: Patient    General/Short Assessment  Does the patient have all their medications?: Yes  Is the patient experiencing any new  or worsening symptoms?: No  Does the patient need any referrals to supportive care services?: No  Discussion with patient: Answered patient's questions and addressed concerns, Reviewed how and when to contact clinic, Reviewed patient's future appointments    Pain  Patient Reported Pain?: Yes, is currently well-managed  Pain Score:  (1-7)    Patient Coping  Accepting    Clinic Utilization  Patient Aware of Next Appointment?: Yes    Other Patient Concerns  Other Patient Reported Concerns: No    Intervention/Education provided during outreach:                                                       Labs three time week    Follow up call in 1-2 weeks  Patient to follow up as scheduled at next appt  Patient to call/Logo[employer]arden[employer] message with updates  Confirmed patient has clinic and triage numbers    Signature:  Lisha Winkler RN

## 2025-05-18 ENCOUNTER — APPOINTMENT (OUTPATIENT)
Dept: GENERAL RADIOLOGY | Facility: OTHER | Age: 60
End: 2025-05-18
Attending: STUDENT IN AN ORGANIZED HEALTH CARE EDUCATION/TRAINING PROGRAM
Payer: MEDICARE

## 2025-05-18 ENCOUNTER — HOSPITAL ENCOUNTER (EMERGENCY)
Facility: OTHER | Age: 60
Discharge: OTHER INSTITUTION | End: 2025-05-18
Attending: STUDENT IN AN ORGANIZED HEALTH CARE EDUCATION/TRAINING PROGRAM
Payer: MEDICARE

## 2025-05-18 ENCOUNTER — APPOINTMENT (OUTPATIENT)
Dept: CT IMAGING | Facility: OTHER | Age: 60
End: 2025-05-18
Attending: STUDENT IN AN ORGANIZED HEALTH CARE EDUCATION/TRAINING PROGRAM
Payer: MEDICARE

## 2025-05-18 ENCOUNTER — TELEPHONE (OUTPATIENT)
Dept: ONCOLOGY | Facility: CLINIC | Age: 60
End: 2025-05-18

## 2025-05-18 ENCOUNTER — HOSPITAL ENCOUNTER (INPATIENT)
Facility: CLINIC | Age: 60
LOS: 2 days | Discharge: HOME | DRG: 865 | End: 2025-05-20
Attending: STUDENT IN AN ORGANIZED HEALTH CARE EDUCATION/TRAINING PROGRAM
Payer: MEDICARE

## 2025-05-18 VITALS
HEART RATE: 69 BPM | TEMPERATURE: 98 F | DIASTOLIC BLOOD PRESSURE: 80 MMHG | BODY MASS INDEX: 27.88 KG/M2 | WEIGHT: 177.6 LBS | SYSTOLIC BLOOD PRESSURE: 144 MMHG | OXYGEN SATURATION: 94 % | RESPIRATION RATE: 18 BRPM | HEIGHT: 67 IN

## 2025-05-18 DIAGNOSIS — C92.01 ACUTE MYELOID LEUKEMIA IN REMISSION (H): ICD-10-CM

## 2025-05-18 DIAGNOSIS — K52.9 ENTEROCOLITIS: ICD-10-CM

## 2025-05-18 DIAGNOSIS — D70.9 NEUTROPENIC FEVER: ICD-10-CM

## 2025-05-18 DIAGNOSIS — R50.81 NEUTROPENIC FEVER: ICD-10-CM

## 2025-05-18 DIAGNOSIS — B34.2 CORONAVIRUS INFECTION: ICD-10-CM

## 2025-05-18 DIAGNOSIS — T45.1X5A ANTINEOPLASTIC CHEMOTHERAPY INDUCED PANCYTOPENIA: ICD-10-CM

## 2025-05-18 DIAGNOSIS — C92.00 ACUTE MYELOID LEUKEMIA NOT HAVING ACHIEVED REMISSION (H): Primary | ICD-10-CM

## 2025-05-18 DIAGNOSIS — K52.89 NEUTROPENIC COLITIS: ICD-10-CM

## 2025-05-18 DIAGNOSIS — R19.7 DIARRHEA, UNSPECIFIED TYPE: ICD-10-CM

## 2025-05-18 DIAGNOSIS — D61.810 ANTINEOPLASTIC CHEMOTHERAPY INDUCED PANCYTOPENIA: ICD-10-CM

## 2025-05-18 DIAGNOSIS — D70.9 NEUTROPENIC COLITIS: ICD-10-CM

## 2025-05-18 LAB
ABO + RH BLD: NORMAL
ALBUMIN SERPL BCG-MCNC: 3.7 G/DL (ref 3.5–5.2)
ALBUMIN UR-MCNC: 50 MG/DL
ALP SERPL-CCNC: 146 U/L (ref 40–150)
ALT SERPL W P-5'-P-CCNC: 16 U/L (ref 0–50)
ANION GAP SERPL CALCULATED.3IONS-SCNC: 13 MMOL/L (ref 7–15)
APPEARANCE UR: CLEAR
AST SERPL W P-5'-P-CCNC: 14 U/L (ref 0–45)
BACTERIA #/AREA URNS HPF: ABNORMAL /HPF
BASE EXCESS BLDV CALC-SCNC: 0 MMOL/L (ref -3–3)
BILIRUB SERPL-MCNC: 1.4 MG/DL
BILIRUB UR QL STRIP: NEGATIVE
BLD GP AB SCN SERPL QL: NEGATIVE
BLD PROD TYP BPU: NORMAL
BLD PROD TYP BPU: NORMAL
BLOOD COMPONENT TYPE: NORMAL
BLOOD COMPONENT TYPE: NORMAL
BUN SERPL-MCNC: 13.1 MG/DL (ref 6–20)
C DIFF TOX B STL QL: NEGATIVE
CALCIUM SERPL-MCNC: 9.1 MG/DL (ref 8.8–10.4)
CHLORIDE SERPL-SCNC: 94 MMOL/L (ref 98–107)
CODING SYSTEM: NORMAL
CODING SYSTEM: NORMAL
COLOR UR AUTO: ABNORMAL
CREAT SERPL-MCNC: 0.65 MG/DL (ref 0.51–0.95)
CROSSMATCH: NORMAL
CRP SERPL-MCNC: 346.45 MG/L
EGFRCR SERPLBLD CKD-EPI 2021: >90 ML/MIN/1.73M2
ERYTHROCYTE [DISTWIDTH] IN BLOOD BY AUTOMATED COUNT: 15.8 % (ref 10–15)
FLUAV RNA SPEC QL NAA+PROBE: NEGATIVE
FLUBV RNA RESP QL NAA+PROBE: NEGATIVE
GLUCOSE SERPL-MCNC: 128 MG/DL (ref 70–99)
GLUCOSE UR STRIP-MCNC: NEGATIVE MG/DL
HCO3 BLDV-SCNC: 24 MMOL/L (ref 21–28)
HCO3 SERPL-SCNC: 22 MMOL/L (ref 22–29)
HCT VFR BLD AUTO: 18.6 % (ref 35–47)
HGB BLD-MCNC: 6.4 G/DL (ref 11.7–15.7)
HGB UR QL STRIP: ABNORMAL
HOLD SPECIMEN: NORMAL
HOLD SPECIMEN: NORMAL
ISSUE DATE AND TIME: NORMAL
ISSUE DATE AND TIME: NORMAL
KETONES UR STRIP-MCNC: NEGATIVE MG/DL
LACTATE SERPL-SCNC: 1.1 MMOL/L (ref 0.7–2)
LEUKOCYTE ESTERASE UR QL STRIP: NEGATIVE
LIPASE SERPL-CCNC: 11 U/L (ref 13–60)
MCH RBC QN AUTO: 29.9 PG (ref 26.5–33)
MCHC RBC AUTO-ENTMCNC: 34.4 G/DL (ref 31.5–36.5)
MCV RBC AUTO: 87 FL (ref 78–100)
NITRATE UR QL: NEGATIVE
NRBC # BLD AUTO: 0 10E3/UL
NRBC BLD AUTO-RTO: 0 /100
O2/TOTAL GAS SETTING VFR VENT: 21 %
OXYHGB MFR BLDV: 39 % (ref 70–75)
PCO2 BLDV: 34 MM HG (ref 40–50)
PH BLDV: 7.46 [PH] (ref 7.32–7.43)
PH UR STRIP: 7.5 [PH] (ref 5–9)
PLAT MORPH BLD: NORMAL
PLATELET # BLD AUTO: 4 10E3/UL (ref 150–450)
PO2 BLDV: 22 MM HG (ref 25–47)
POTASSIUM SERPL-SCNC: 3.5 MMOL/L (ref 3.4–5.3)
PROCALCITONIN SERPL IA-MCNC: 0.52 NG/ML
PROT SERPL-MCNC: 7.2 G/DL (ref 6.4–8.3)
RBC # BLD AUTO: 2.14 10E6/UL (ref 3.8–5.2)
RBC MORPH BLD: NORMAL
RBC URINE: 65 /HPF
RSV RNA SPEC NAA+PROBE: NEGATIVE
SAO2 % BLDV: 41.3 % (ref 70–75)
SARS-COV-2 RNA RESP QL NAA+PROBE: NEGATIVE
SODIUM SERPL-SCNC: 129 MMOL/L (ref 135–145)
SP GR UR STRIP: 1.01 (ref 1–1.03)
SPECIMEN EXP DATE BLD: NORMAL
UNIT ABO/RH: NORMAL
UNIT ABO/RH: NORMAL
UNIT NUMBER: NORMAL
UNIT NUMBER: NORMAL
UNIT STATUS: NORMAL
UNIT STATUS: NORMAL
UNIT TYPE ISBT: 5100
UNIT TYPE ISBT: 6200
UROBILINOGEN UR STRIP-MCNC: 3 MG/DL
WBC # BLD AUTO: 0.4 10E3/UL (ref 4–11)
WBC URINE: 1 /HPF

## 2025-05-18 PROCEDURE — 83690 ASSAY OF LIPASE: CPT | Performed by: STUDENT IN AN ORGANIZED HEALTH CARE EDUCATION/TRAINING PROGRAM

## 2025-05-18 PROCEDURE — P9037 PLATE PHERES LEUKOREDU IRRAD: HCPCS | Performed by: STUDENT IN AN ORGANIZED HEALTH CARE EDUCATION/TRAINING PROGRAM

## 2025-05-18 PROCEDURE — 85730 THROMBOPLASTIN TIME PARTIAL: CPT

## 2025-05-18 PROCEDURE — 96365 THER/PROPH/DIAG IV INF INIT: CPT

## 2025-05-18 PROCEDURE — 86140 C-REACTIVE PROTEIN: CPT | Performed by: STUDENT IN AN ORGANIZED HEALTH CARE EDUCATION/TRAINING PROGRAM

## 2025-05-18 PROCEDURE — 96361 HYDRATE IV INFUSION ADD-ON: CPT

## 2025-05-18 PROCEDURE — 85018 HEMOGLOBIN: CPT | Performed by: STUDENT IN AN ORGANIZED HEALTH CARE EDUCATION/TRAINING PROGRAM

## 2025-05-18 PROCEDURE — 81003 URINALYSIS AUTO W/O SCOPE: CPT | Performed by: STUDENT IN AN ORGANIZED HEALTH CARE EDUCATION/TRAINING PROGRAM

## 2025-05-18 PROCEDURE — 83735 ASSAY OF MAGNESIUM: CPT

## 2025-05-18 PROCEDURE — 82805 BLOOD GASES W/O2 SATURATION: CPT | Performed by: STUDENT IN AN ORGANIZED HEALTH CARE EDUCATION/TRAINING PROGRAM

## 2025-05-18 PROCEDURE — 250N000013 HC RX MED GY IP 250 OP 250 PS 637: Performed by: STUDENT IN AN ORGANIZED HEALTH CARE EDUCATION/TRAINING PROGRAM

## 2025-05-18 PROCEDURE — 83615 LACTATE (LD) (LDH) ENZYME: CPT

## 2025-05-18 PROCEDURE — 85384 FIBRINOGEN ACTIVITY: CPT

## 2025-05-18 PROCEDURE — 250N000011 HC RX IP 250 OP 636: Mod: JZ | Performed by: STUDENT IN AN ORGANIZED HEALTH CARE EDUCATION/TRAINING PROGRAM

## 2025-05-18 PROCEDURE — 250N000011 HC RX IP 250 OP 636: Performed by: STUDENT IN AN ORGANIZED HEALTH CARE EDUCATION/TRAINING PROGRAM

## 2025-05-18 PROCEDURE — 99285 EMERGENCY DEPT VISIT HI MDM: CPT | Mod: 25

## 2025-05-18 PROCEDURE — 84550 ASSAY OF BLOOD/URIC ACID: CPT

## 2025-05-18 PROCEDURE — 87637 SARSCOV2&INF A&B&RSV AMP PRB: CPT | Performed by: STUDENT IN AN ORGANIZED HEALTH CARE EDUCATION/TRAINING PROGRAM

## 2025-05-18 PROCEDURE — 96366 THER/PROPH/DIAG IV INF ADDON: CPT

## 2025-05-18 PROCEDURE — 71046 X-RAY EXAM CHEST 2 VIEWS: CPT

## 2025-05-18 PROCEDURE — 84100 ASSAY OF PHOSPHORUS: CPT

## 2025-05-18 PROCEDURE — 80053 COMPREHEN METABOLIC PANEL: CPT | Performed by: STUDENT IN AN ORGANIZED HEALTH CARE EDUCATION/TRAINING PROGRAM

## 2025-05-18 PROCEDURE — 99285 EMERGENCY DEPT VISIT HI MDM: CPT | Performed by: STUDENT IN AN ORGANIZED HEALTH CARE EDUCATION/TRAINING PROGRAM

## 2025-05-18 PROCEDURE — 83605 ASSAY OF LACTIC ACID: CPT | Performed by: STUDENT IN AN ORGANIZED HEALTH CARE EDUCATION/TRAINING PROGRAM

## 2025-05-18 PROCEDURE — 86923 COMPATIBILITY TEST ELECTRIC: CPT

## 2025-05-18 PROCEDURE — 36430 TRANSFUSION BLD/BLD COMPNT: CPT | Performed by: STUDENT IN AN ORGANIZED HEALTH CARE EDUCATION/TRAINING PROGRAM

## 2025-05-18 PROCEDURE — 96367 TX/PROPH/DG ADDL SEQ IV INF: CPT

## 2025-05-18 PROCEDURE — 96375 TX/PRO/DX INJ NEW DRUG ADDON: CPT

## 2025-05-18 PROCEDURE — 86850 RBC ANTIBODY SCREEN: CPT | Performed by: STUDENT IN AN ORGANIZED HEALTH CARE EDUCATION/TRAINING PROGRAM

## 2025-05-18 PROCEDURE — 36415 COLL VENOUS BLD VENIPUNCTURE: CPT

## 2025-05-18 PROCEDURE — 36415 COLL VENOUS BLD VENIPUNCTURE: CPT | Performed by: STUDENT IN AN ORGANIZED HEALTH CARE EDUCATION/TRAINING PROGRAM

## 2025-05-18 PROCEDURE — 85007 BL SMEAR W/DIFF WBC COUNT: CPT

## 2025-05-18 PROCEDURE — 84145 PROCALCITONIN (PCT): CPT | Performed by: STUDENT IN AN ORGANIZED HEALTH CARE EDUCATION/TRAINING PROGRAM

## 2025-05-18 PROCEDURE — 250N000009 HC RX 250: Performed by: STUDENT IN AN ORGANIZED HEALTH CARE EDUCATION/TRAINING PROGRAM

## 2025-05-18 PROCEDURE — 86923 COMPATIBILITY TEST ELECTRIC: CPT | Performed by: STUDENT IN AN ORGANIZED HEALTH CARE EDUCATION/TRAINING PROGRAM

## 2025-05-18 PROCEDURE — 87040 BLOOD CULTURE FOR BACTERIA: CPT | Performed by: STUDENT IN AN ORGANIZED HEALTH CARE EDUCATION/TRAINING PROGRAM

## 2025-05-18 PROCEDURE — 250N000011 HC RX IP 250 OP 636: Performed by: EMERGENCY MEDICINE

## 2025-05-18 PROCEDURE — 258N000003 HC RX IP 258 OP 636: Performed by: STUDENT IN AN ORGANIZED HEALTH CARE EDUCATION/TRAINING PROGRAM

## 2025-05-18 PROCEDURE — 120N000002 HC R&B MED SURG/OB UMMC

## 2025-05-18 PROCEDURE — 85610 PROTHROMBIN TIME: CPT

## 2025-05-18 PROCEDURE — 87493 C DIFF AMPLIFIED PROBE: CPT | Performed by: STUDENT IN AN ORGANIZED HEALTH CARE EDUCATION/TRAINING PROGRAM

## 2025-05-18 PROCEDURE — 71046 X-RAY EXAM CHEST 2 VIEWS: CPT | Mod: 26 | Performed by: RADIOLOGY

## 2025-05-18 PROCEDURE — P9040 RBC LEUKOREDUCED IRRADIATED: HCPCS | Performed by: STUDENT IN AN ORGANIZED HEALTH CARE EDUCATION/TRAINING PROGRAM

## 2025-05-18 PROCEDURE — 74177 CT ABD & PELVIS W/CONTRAST: CPT

## 2025-05-18 PROCEDURE — 85027 COMPLETE CBC AUTOMATED: CPT

## 2025-05-18 PROCEDURE — 74177 CT ABD & PELVIS W/CONTRAST: CPT | Mod: 26 | Performed by: RADIOLOGY

## 2025-05-18 RX ORDER — LORAZEPAM 2 MG/ML
1 INJECTION INTRAMUSCULAR ONCE
Status: COMPLETED | OUTPATIENT
Start: 2025-05-18 | End: 2025-05-18

## 2025-05-18 RX ORDER — METRONIDAZOLE 500 MG/100ML
500 INJECTION, SOLUTION INTRAVENOUS ONCE
Status: COMPLETED | OUTPATIENT
Start: 2025-05-18 | End: 2025-05-18

## 2025-05-18 RX ORDER — ACYCLOVIR 200 MG/1
400 CAPSULE ORAL ONCE
Status: COMPLETED | OUTPATIENT
Start: 2025-05-18 | End: 2025-05-18

## 2025-05-18 RX ORDER — ONDANSETRON 4 MG/1
4 TABLET, FILM COATED ORAL EVERY 8 HOURS PRN
Status: DISCONTINUED | OUTPATIENT
Start: 2025-05-18 | End: 2025-05-20 | Stop reason: HOSPADM

## 2025-05-18 RX ORDER — ONDANSETRON 4 MG/1
4 TABLET, ORALLY DISINTEGRATING ORAL EVERY 8 HOURS PRN
Status: DISCONTINUED | OUTPATIENT
Start: 2025-05-18 | End: 2025-05-20 | Stop reason: HOSPADM

## 2025-05-18 RX ORDER — AMOXICILLIN 250 MG
2 CAPSULE ORAL 2 TIMES DAILY PRN
Status: DISCONTINUED | OUTPATIENT
Start: 2025-05-18 | End: 2025-05-20 | Stop reason: HOSPADM

## 2025-05-18 RX ORDER — AMOXICILLIN 250 MG
1 CAPSULE ORAL 2 TIMES DAILY PRN
Status: DISCONTINUED | OUTPATIENT
Start: 2025-05-18 | End: 2025-05-20 | Stop reason: HOSPADM

## 2025-05-18 RX ORDER — FLUCONAZOLE 2 MG/ML
400 INJECTION, SOLUTION INTRAVENOUS ONCE
Status: COMPLETED | OUTPATIENT
Start: 2025-05-18 | End: 2025-05-18

## 2025-05-18 RX ORDER — CEFEPIME HYDROCHLORIDE 2 G/1
2 INJECTION, POWDER, FOR SOLUTION INTRAVENOUS EVERY 8 HOURS
Status: DISCONTINUED | OUTPATIENT
Start: 2025-05-19 | End: 2025-05-20

## 2025-05-18 RX ORDER — ACETAMINOPHEN 325 MG/1
650 TABLET ORAL EVERY 4 HOURS PRN
Status: DISCONTINUED | OUTPATIENT
Start: 2025-05-18 | End: 2025-05-20 | Stop reason: HOSPADM

## 2025-05-18 RX ORDER — POLYETHYLENE GLYCOL 3350 17 G/17G
17 POWDER, FOR SOLUTION ORAL 2 TIMES DAILY PRN
Status: DISCONTINUED | OUTPATIENT
Start: 2025-05-18 | End: 2025-05-20 | Stop reason: HOSPADM

## 2025-05-18 RX ORDER — ONDANSETRON 2 MG/ML
4 INJECTION INTRAMUSCULAR; INTRAVENOUS EVERY 8 HOURS PRN
Status: DISCONTINUED | OUTPATIENT
Start: 2025-05-18 | End: 2025-05-20 | Stop reason: HOSPADM

## 2025-05-18 RX ORDER — SODIUM CHLORIDE 9 MG/ML
INJECTION, SOLUTION INTRAVENOUS CONTINUOUS
Status: DISCONTINUED | OUTPATIENT
Start: 2025-05-18 | End: 2025-05-18 | Stop reason: HOSPADM

## 2025-05-18 RX ORDER — SODIUM CHLORIDE 9 MG/ML
INJECTION, SOLUTION INTRAVENOUS CONTINUOUS
Status: DISCONTINUED | OUTPATIENT
Start: 2025-05-19 | End: 2025-05-19

## 2025-05-18 RX ORDER — ACETAMINOPHEN 500 MG
1000 TABLET ORAL ONCE
Status: COMPLETED | OUTPATIENT
Start: 2025-05-18 | End: 2025-05-18

## 2025-05-18 RX ORDER — PROCHLORPERAZINE MALEATE 5 MG/1
5 TABLET ORAL EVERY 6 HOURS PRN
Status: DISCONTINUED | OUTPATIENT
Start: 2025-05-18 | End: 2025-05-20 | Stop reason: HOSPADM

## 2025-05-18 RX ORDER — CEFEPIME HYDROCHLORIDE 2 G/1
2 INJECTION, POWDER, FOR SOLUTION INTRAVENOUS ONCE
Status: COMPLETED | OUTPATIENT
Start: 2025-05-18 | End: 2025-05-18

## 2025-05-18 RX ORDER — HYOSCYAMINE SULFATE 0.12 MG/1
125 TABLET SUBLINGUAL ONCE
Status: COMPLETED | OUTPATIENT
Start: 2025-05-18 | End: 2025-05-18

## 2025-05-18 RX ORDER — GUAIFENESIN/DEXTROMETHORPHAN 100-10MG/5
5 SYRUP ORAL EVERY 4 HOURS PRN
Status: DISCONTINUED | OUTPATIENT
Start: 2025-05-18 | End: 2025-05-20 | Stop reason: HOSPADM

## 2025-05-18 RX ORDER — ACETAMINOPHEN 10 MG/ML
1000 INJECTION, SOLUTION INTRAVENOUS ONCE
Status: DISCONTINUED | OUTPATIENT
Start: 2025-05-18 | End: 2025-05-18

## 2025-05-18 RX ORDER — IOPAMIDOL 755 MG/ML
103 INJECTION, SOLUTION INTRAVASCULAR ONCE
Status: COMPLETED | OUTPATIENT
Start: 2025-05-18 | End: 2025-05-18

## 2025-05-18 RX ORDER — METRONIDAZOLE 500 MG/100ML
500 INJECTION, SOLUTION INTRAVENOUS EVERY 8 HOURS
Status: DISCONTINUED | OUTPATIENT
Start: 2025-05-19 | End: 2025-05-20

## 2025-05-18 RX ADMIN — FLUCONAZOLE IN SODIUM CHLORIDE 400 MG: 2 INJECTION, SOLUTION INTRAVENOUS at 15:12

## 2025-05-18 RX ADMIN — ACYCLOVIR 400 MG: 200 CAPSULE ORAL at 15:01

## 2025-05-18 RX ADMIN — SODIUM CHLORIDE 60 ML: 9 INJECTION, SOLUTION INTRAVENOUS at 15:27

## 2025-05-18 RX ADMIN — METRONIDAZOLE 500 MG: 500 INJECTION, SOLUTION INTRAVENOUS at 13:05

## 2025-05-18 RX ADMIN — SODIUM CHLORIDE 1000 ML: 0.9 INJECTION, SOLUTION INTRAVENOUS at 13:02

## 2025-05-18 RX ADMIN — CEFEPIME 2 G: 2 INJECTION, POWDER, FOR SOLUTION INTRAVENOUS at 13:10

## 2025-05-18 RX ADMIN — IOPAMIDOL 103 ML: 755 INJECTION, SOLUTION INTRAVENOUS at 15:27

## 2025-05-18 RX ADMIN — Medication 2000 MG: at 13:56

## 2025-05-18 RX ADMIN — LORAZEPAM 1 MG: 2 INJECTION INTRAMUSCULAR; INTRAVENOUS at 21:09

## 2025-05-18 RX ADMIN — SODIUM CHLORIDE: 900 INJECTION INTRAVENOUS at 19:51

## 2025-05-18 RX ADMIN — HYOSCYAMINE SULFATE 125 MCG: 0.12 TABLET, ORALLY DISINTEGRATING SUBLINGUAL at 12:54

## 2025-05-18 RX ADMIN — ACETAMINOPHEN 1000 MG: 500 TABLET, FILM COATED ORAL at 15:33

## 2025-05-18 ASSESSMENT — ACTIVITIES OF DAILY LIVING (ADL)
ADLS_ACUITY_SCORE: 54

## 2025-05-18 ASSESSMENT — COLUMBIA-SUICIDE SEVERITY RATING SCALE - C-SSRS
2. HAVE YOU ACTUALLY HAD ANY THOUGHTS OF KILLING YOURSELF IN THE PAST MONTH?: NO
1. IN THE PAST MONTH, HAVE YOU WISHED YOU WERE DEAD OR WISHED YOU COULD GO TO SLEEP AND NOT WAKE UP?: NO
6. HAVE YOU EVER DONE ANYTHING, STARTED TO DO ANYTHING, OR PREPARED TO DO ANYTHING TO END YOUR LIFE?: NO

## 2025-05-18 NOTE — TELEPHONE ENCOUNTER
Received a call about this patient who is currently at the [facility] ED with fever, diarrhea and abdominal cramping. She has a known history of AML and receiving HiDAC, most recent cycle on 5/6/25. Her CT scan is showing evidence of enterocolitis. The patient is currently pancytopenic with platelet count of 4k, Hb 6.4 and WBC 0.4. Infection work-up has been initiated, and she has been started on Cefepime and Flagyl. She is otherwise hemodynamically stable. I was informed that the [facility] facility does not currently have platelets available for transfusion. I recommended transferring the patient to [facility] for further management of neutropenic fever and enterocolitis.

## 2025-05-18 NOTE — ED PROVIDER NOTES
History     Chief Complaint   Patient presents with    Abdominal Pain    Diarrhea       Alejandra Villegas is a 59 year old female who presents with diarrhea and abdominal cramping.  Onset 3 days ago.  Abdominal cramping is diffuse.  Diarrhea has been watery with small solid chunks, approximately 1/day.  Borderline febrile here.  Denies any chills, nausea, vomiting, dyspnea, other pain, urinary symptoms, blood in the stool, vaginal bleeding or discharge.  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  She tried a heating pad that she felt was helpful for the cramping.      Allergies   Allergen Reactions    Adhesive Tape     Bee Pollen Swelling     Seasonal    Bee Venom Unknown    Folic Acid Itching    Pollen Extract      Seasonal    Amoxicillin Rash    Chlorhexidine Rash     After several weeks, started to develop rash on R neck down to chest and arm. Also noted on back of knees. Providers suggesting related to CHG as nothing else is new for pt.     Liquid Adhesive Rash    Meloxicam Rash    Nabumetone GI Disturbance     GI upset       Patient Active Problem List    Diagnosis Date Noted    AML (acute myeloblastic leukemia) (H) 05/06/2025     Priority: Medium    Hypokalemia 04/28/2025     Priority: Medium    Anemia of chronic disease 04/27/2025     Priority: Medium    Community acquired pneumonia of left lower lobe of lung 04/27/2025     Priority: Medium    Pancytopenia due to antineoplastic chemotherapy 04/06/2025     Priority: Medium    Hiatal hernia 04/06/2025     Priority: Medium    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) 03/04/2025     Priority: Medium    Anemia 02/26/2025     Priority: Medium    Leukopenia 02/26/2025     Priority: Medium    Macrocytic anemia 02/13/2025     Priority: Medium    Pulmonary emphysema, unspecified emphysema type (H) 01/23/2024     Priority: Medium    Benign essential hypertension 01/23/2024     Priority: Medium    Mixed hyperlipidemia 01/23/2024      Priority: Medium    Pain in joint, ankle and foot, left 10/05/2022     Priority: Medium    CKD (chronic kidney disease) stage 2, GFR 60-89 ml/min 03/31/2022     Priority: Medium    Osteopenia of right foot 02/03/2021     Priority: Medium    Anxiety state 01/30/2018     Priority: Medium    Other isolated or specific phobias 01/30/2018     Priority: Medium    Degenerative disc disease at L5-S1 level 01/30/2018     Priority: Medium    Hidradenitis suppurativa 01/30/2018     Priority: Medium    Tobacco use disorder 01/30/2018     Priority: Medium    Gastroesophageal reflux disease 01/18/2017     Priority: Medium    Pain management contract broken 06/05/2015     Priority: Medium     Overview:   Contract violation - see 12/1/15 letter.      Urge incontinence 06/04/2014     Priority: Medium    Alopecia areata 02/21/2014     Priority: Medium    Benign paroxysmal positional vertigo 02/28/2013     Priority: Medium    Ovarian cyst, left 05/09/2012     Priority: Medium    Other acquired deformity of ankle and foot(736.79) 05/09/2012     Priority: Medium    RA (rheumatoid arthritis) (H) 05/09/2012     Priority: Medium     Overview:   Diagnosed [place] 2012      Asthma 05/04/2012     Priority: Medium    Seasonal allergic rhinitis 09/27/2011     Priority: Medium    Symptomatic menopausal or female climacteric states 09/21/2010     Priority: Medium    Other diseases of lung, not elsewhere classified 08/01/2007     Priority: Medium       Past Medical History:   Diagnosis Date    Allergic rhinitis 09/27/2011    Disorder of kidney and ureter 08/08/2008    Dorsalgia     Generalized anxiety disorder     Hidradenitis suppurativa     Other disorders of lung (CODE) 08/01/2007    Other specified disorders of Eustachian tube, unspecified ear 02/27/2012    Personal history of other medical treatment (CODE)     Rheumatoid arthritis (H) 02/27/2012    Tobacco use        Past Surgical History:   Procedure Laterality Date    ARTHROSCOPY KNEE   05/2017    Dr Torres    ARTHROSCOPY KNEE  07/20/2017    Dr Garza, lateral release, meniscal repair    ARTHROTOMY WRIST Left 2011    Dr. Torres    BONE MARROW BIOPSY, BONE SPECIMEN, NEEDLE/TROCAR Left 02/13/2025    Procedure: Bone marrow biopsy, bone specimen, needle/trocar;  Surgeon: Hema Mari MD;  Location:  OR    CHOLECYSTECTOMY  06/2007    Emergency tracheotomy following failed intubation for laparoscopic cholecystectomy.    DILATION AND CURETTAGE  09/2006         HERNIA REPAIR  2007    Incisoinal hernia repair    HYSTERECTOMY TOTAL ABDOMINAL  02/2010         IMPLANT STIMULATOR AND LEADS SACRAL NERVE (STAGE ONE AND TWO) N/A 12/11/2018    Procedure: Interstim Battery Replacement;  Surgeon: Rl Ambriz MD;  Location:  OR    INTERSTIM DEVICE STAGE 2  01/06/2014    Dr. Ambriz [facility]    LAPAROSCOPIC ABLATION ENDOMETRIOSIS  09/2006         OOPHORECTOMY Left 2010     Dr Thorpe    PICC INSERTION - DOUBLE LUMEN Right 03/05/2025    5FR, DL, total cath length=42 CM, visible cath length= 3CM, brachial medial vein    PICC INSERTION - DOUBLE LUMEN Right 05/06/2025    44-3Ccm, Basilic vein    RECONSTRUCT FOREFOOT WITH METATARSOPHALANGEAL (MTP) FUSION Right 05/05/2022    Procedure: Right fibular sesmoid excision and 1st metatarsal phalangeal joint fusion;  Surgeon: Rl Nathan DPM;  Location:  OR    RELEASE CARPAL TUNNEL  02/02/2017         RELEASE PLANTAR FASCIA Left 12/19/2024    Procedure: excision of soft tissue mass left foot,  gastroc recession;  Surgeon: Rl Nathan DPM;  Location:  OR    REMOVE HARDWARE FOOT Right 08/10/2023    Procedure: REMOVAL, HARDWARE, FOOT;  Surgeon: Rl Nathan DPM;  Location: [facility] OR    SALPINGO OOPHORECTOMY,R/L/KELSEY Right 06/1999    Right salpingo-oophorectomy for hemorrhagic corpus luteum cyst    TRACHEOSTOMY  2007    emergency following failed intubation during cholecystectomy    TYMPANOSTOMY, LOCAL/TOPICAL ANESTHESIA  08/2006    PE tube placement       Family  History   Problem Relation Age of Onset    Arthritis Mother         Arthritis,Rheumatoid    Cancer Mother         Cancer, lung and uterine cancer    Heart Disease Father         Heart Disease,CAD, CABG, peripheral vascular dise    Thyroid Disease Father         Thyroid Disease, hyperlipidemia    Other - See Comments Father         djd    Asthma Brother         Asthma    Asthma Sister         Asthma    Other - See Comments Sister         Psychiatric illness,Anxiety    Other - See Comments Son         x2 back surgeries    Breast Cancer No family hx of         Cancer-breast       Social History     Tobacco Use    Smoking status: Every Day     Current packs/day: 1.00     Average packs/day: 1 pack/day for 44.4 years (44.4 ttl pk-yrs)     Types: Cigarettes     Start date: 1981     Passive exposure: Past    Smokeless tobacco: Never    Tobacco comments:     Passive exposure in childhood and adult homes and some work places   Vaping Use    Vaping status: Never Used   Substance Use Topics    Alcohol use: No     Alcohol/week: 0.0 standard drinks of alcohol    Drug use: No       Medications:    acetaminophen (TYLENOL) 325 MG tablet  acyclovir (ZOVIRAX) 400 MG tablet  albuterol (PROAIR HFA/PROVENTIL HFA/VENTOLIN HFA) 108 (90 Base) MCG/ACT inhaler  albuterol (PROVENTIL) (2.5 MG/3ML) 0.083% neb solution  artificial saliva (BIOTENE MT) AERS spray  budesonide-formoterol (SYMBICORT) 80-4.5 MCG/ACT Inhaler  calcium carbonate (TUMS) 500 MG chewable tablet  cyanocobalamin (VITAMIN B-12) 500 MCG tablet  cyclobenzaprine (FLEXERIL) 10 MG tablet  famotidine (PEPCID) 20 MG tablet  fluticasone (FLONASE) 50 MCG/ACT nasal spray  gabapentin (NEURONTIN) 400 MG capsule  guaiFENesin (MUCINEX) 600 MG 12 hr tablet  hydrOXYzine HCl (ATARAX) 25 MG tablet  ipratropium - albuterol 0.5 mg/2.5 mg/3 mL (DUONEB) 0.5-2.5 (3) MG/3ML neb solution  levofloxacin (LEVAQUIN) 250 MG tablet  loratadine (CLARITIN) 10 MG tablet  ondansetron (ZOFRAN ODT) 4 MG ODT  "tab  order for DME  pantoprazole (PROTONIX) 40 MG EC tablet  PARoxetine (PAXIL) 20 MG tablet  posaconazole (NOXAFIL) 100 MG DR tablet  prednisoLONE acetate (PRED FORTE) 1 % ophthalmic suspension  prochlorperazine (COMPAZINE) 5 MG tablet  rosuvastatin (CRESTOR) 20 MG tablet  sucralfate (CARAFATE) 1 GM/10ML suspension  SUMAtriptan (IMITREX) 50 MG tablet  Vitamin D3 (CHOLECALCIFEROL) 25 mcg (1000 units) tablet        Review of Systems: See HPI for pertinent negatives and positives. All other systems reviewed and found to be negative.    Physical Exam   /69   Pulse 74   Temp 97  F (36.1  C)   Resp 23   Ht 1.702 m (5' 7\")   Wt 80.6 kg (177 lb 9.6 oz)   LMP 01/01/2007 (Approximate)   SpO2 95%   BMI 27.82 kg/m       General: awake, comfortable, ill appearing  HEENT: atraumatic  Respiratory: normal effort, bilateral faint expiratory wheeze  Cardiovascular: Tachycardic, regular rhythm, no murmurs  Abdomen: Bowel sounds present, soft, nondistended, nontender  Extremities: no deformities, edema, or tenderness  Skin: warm, dry, no rashes  Neuro: alert, no focal deficits  Psych: appropriate mood and affect    ED Course      Results for orders placed or performed during the hospital encounter of 05/18/25 (from the past 24 hours)   CBC with platelets differential    Narrative    The following orders were created for panel order CBC with platelets differential.  Procedure                               Abnormality         Status                     ---------                               -----------         ------                     CBC with platelets and ...[[id number]]  Abnormal            Edited Result - FINAL      RBC and Platelet Morpho...[[id number]]                      Final result                 Please view results for these tests on the individual orders.   Comprehensive metabolic panel   Result Value Ref Range    Sodium 129 (L) 135 - 145 mmol/L    Potassium 3.5 3.4 - 5.3 mmol/L    Carbon Dioxide (CO2) 22 " 22 - 29 mmol/L    Anion Gap 13 7 - 15 mmol/L    Urea Nitrogen 13.1 6.0 - 20.0 mg/dL    Creatinine 0.65 0.51 - 0.95 mg/dL    GFR Estimate >90 >60 mL/min/1.73m2    Calcium 9.1 8.8 - 10.4 mg/dL    Chloride 94 (L) 98 - 107 mmol/L    Glucose 128 (H) 70 - 99 mg/dL    Alkaline Phosphatase 146 40 - 150 U/L    AST 14 0 - 45 U/L    ALT 16 0 - 50 U/L    Protein Total 7.2 6.4 - 8.3 g/dL    Albumin 3.7 3.5 - 5.2 g/dL    Bilirubin Total 1.4 (H) <=1.2 mg/dL   Lactic Acid Whole Blood with 1X Repeat in 2 HR when >2   Result Value Ref Range    Lactic Acid, Initial 1.1 0.7 - 2.0 mmol/L   Blood gas venous   Result Value Ref Range    pH Venous 7.46 (H) 7.32 - 7.43    pCO2 Venous 34 (L) 40 - 50 mm Hg    pO2 Venous 22 (L) 25 - 47 mm Hg    Bicarbonate Venous 24 21 - 28 mmol/L    Base Excess/Deficit Venous 0.0 -3.0 - 3.0 mmol/L    FIO2 21     Oxyhemoglobin Venous 39 (L) 70 - 75 %    O2 Sat, Venous 41.3 (L) 70.0 - 75.0 %    Narrative    In healthy individuals, oxyhemoglobin (O2Hb) and oxygen saturation (SO2) are approximately equal. In the presence of dyshemoglobins, oxyhemoglobin can be considerably lower than oxygen saturation.   Lipase   Result Value Ref Range    Lipase 11 (L) 13 - 60 U/L   [place] Draw    Narrative    The following orders were created for panel order [place] Draw.  Procedure                               Abnormality         Status                     ---------                               -----------         ------                     Extra Blue Top Tube[[id number]]                             Final result               Extra Red Top Tube[[id number]]                              Final result                 Please view results for these tests on the individual orders.   CBC with platelets and differential   Result Value Ref Range    WBC Count 0.4 (LL) 4.0 - 11.0 10e3/uL    RBC Count 2.14 (L) 3.80 - 5.20 10e6/uL    Hemoglobin 6.4 (LL) 11.7 - 15.7 g/dL    Hematocrit 18.6 (L) 35.0 - 47.0 %    MCV 87 78 - 100 fL    MCH 29.9  26.5 - 33.0 pg    MCHC 34.4 31.5 - 36.5 g/dL    RDW 15.8 (H) 10.0 - 15.0 %    Platelet Count 4 (LL) 150 - 450 10e3/uL    NRBCs per 100 WBC 0 <1 /100    Absolute NRBCs 0.0 10e3/uL   Extra Blue Top Tube   Result Value Ref Range    Hold Specimen JIC    Extra Red Top Tube   Result Value Ref Range    Hold Specimen JIC    RBC and Platelet Morphology   Result Value Ref Range    RBC Morphology Confirmed RBC Indices     Platelet Assessment  Automated Count Confirmed. Platelet morphology is normal.     Automated Count Confirmed. Platelet morphology is normal.   ABO/Rh type and screen    Narrative    The following orders were created for panel order ABO/Rh type and screen.  Procedure                               Abnormality         Status                     ---------                               -----------         ------                     Adult Type and Screen[[id number]]                           Edited Result - FINAL        Please view results for these tests on the individual orders.   Adult Type and Screen   Result Value Ref Range    ABO/RH(D) A POS     Antibody Screen Negative Negative    SPECIMEN EXPIRATION DATE [id number]    Procalcitonin   Result Value Ref Range    Procalcitonin 0.52 (H) <0.50 ng/mL   CRP inflammation   Result Value Ref Range    CRP Inflammation 346.45 (H) <5.00 mg/L   Influenza A/B, RSV and SARS-CoV2 PCR (COVID-19) Nose    Specimen: Nose; Swab   Result Value Ref Range    Influenza A PCR Negative Negative    Influenza B PCR Negative Negative    RSV PCR Negative Negative    SARS CoV2 PCR Negative Negative    Narrative    Testing was performed using the Xpert Xpress CoV2/Flu/RSV Assay on the [employer] GeneXpert Instrument. This test should be ordered for the detection of SARS-CoV2, influenza, and RSV viruses in individuals with signs and symptoms of respiratory tract infection. This test is for in vitro diagnostic use under the [place] FDA for laboratories certified under CLIA to perform high or  moderate complexity testing. This test has been [place] FDA cleared. A negative result does not rule out the presence of PCR inhibitors in the specimen or target RNA in concentration below the limit of detection for the assay. If only one viral target is positive but coinfection with multiple targets is suspected, the sample should be re-tested with another FDA cleared, approved, or authorized test, if coninfection would change clinical management. This test was validated by the [facility]. These laboratories are certified under the Clinical Laboratory Improvement Amendments of 1988 (CLIA-88) as qualified to perfom high complexity laboratory testing.   Prepare red blood cells (unit)   Result Value Ref Range    Blood Component Type Red Blood Cells     Product Code A1044U09     Unit Status Issued     Unit Number [id number]     CROSSMATCH Compatible     CODING SYSTEM [id number]     ISSUE DATE AND TIME [id number]     UNIT ABO/RH O+     UNIT TYPE ISBT 5100    C. difficile Toxin B PCR with reflex to C. difficile EIA    Specimen: Per Rectum; Stool   Result Value Ref Range    C Difficile Toxin B by PCR Negative Negative    Narrative    The [employer] Xpert C. difficile Assay, performed on the [employer]  Instrument Systems, is a qualitative in vitro diagnostic test for rapid detection of toxin B gene sequences from unformed (liquid or soft) stool specimens collected from patients suspected of having Clostridioides difficile infection (CDI). The test utilizes automated real-time polymerase chain reaction (PCR) to detect toxin gene sequences associated with toxin producing C. difficile. The Xpert C. difficile Assay is intended as an aid in the diagnosis of CDI.   UA with Microscopic reflex to Culture    Specimen: Urine, Midstream   Result Value Ref Range    Color Urine Light Yellow Colorless, Straw, Light Yellow, Yellow    Appearance Urine Clear Clear    Glucose Urine Negative Negative mg/dL     Bilirubin Urine Negative Negative    Ketones Urine Negative Negative mg/dL    Specific Gravity Urine 1.011 1.000 - 1.030    Blood Urine Large (A) Negative    pH Urine 7.5 5.0 - 9.0    Protein Albumin Urine 50 (A) Negative mg/dL    Urobilinogen Urine 3.0 (A) Normal mg/dL    Nitrite Urine Negative Negative    Leukocyte Esterase Urine Negative Negative    Bacteria Urine Few (A) None Seen /HPF    RBC Urine 65 (H) <=2 /HPF    WBC Urine 1 <=5 /HPF    Narrative    Urine Culture not indicated   CT Abdomen Pelvis w Contrast    Narrative    EXAM: CT ABDOMEN PELVIS W CONTRAST  [facility]  DATE: 5/18/2025    INDICATION: pancytopenic AML pt, abd pain, diarrhea, fever  COMPARISON: CTs CAP 4/14/2025 and 3/19/2025  TECHNIQUE: CT scan of the abdomen and pelvis was performed following injection of IV contrast. Multiplanar reformats were obtained. Dose reduction techniques were used.  CONTRAST: 103mL Isovue 370    FINDINGS:   LOWER CHEST: Faint hazy groundglass density opacity, interlobular septal thickening, bronchial wall thickening and thin bands of linear atelectasis throughout the visualized lungs. No pleural effusion. Heart size normal with no pericardial effusion.   Aortic valve leaflet and three-vessel coronary artery calcification.    HEPATOBILIARY: Diffuse hepatic steatosis and hepatomegaly (craniocaudal dimension right hepatic lobe 22 cm) Liver is otherwise normal. No bile duct dilatation. Cholecystectomy.    PANCREAS: Normal.    SPLEEN: Spleen size normal at 11 cm unchanged.    ADRENAL GLANDS: Normal.    KIDNEYS/BLADDER: Kidneys, ureters and bladder are normal.    BOWEL: Mural edema affecting the duodenum and jejunum with some edema and mild lymph node enlargement in the subtending mesenteric and liquid stool throughout the colon where there is also borderline mural thickening. Findings would be compatible with a   nonspecific acute/active enterocolitis in the proper clinical setting. Normal  appendix. No bowel obstruction. No free air. No free fluid.    LYMPH NODES: No lymphadenopathy.    VASCULATURE: Infrarenal abdominal aortic aneurysm 3.6 x 3.2 cm diameter is unchanged. Calcified atheromatous plaque at the origin of the mesenteric and renal arteries.    PELVIC ORGANS: Hysterectomy. Pelvis otherwise unremarkable.    MUSCULOSKELETAL: No bone lesion or fracture. Bones appear demineralized. Degenerative disc disease lumbosacral interspace. Generator device left gluteal region with left transsacral electrode.      Impression    IMPRESSION:   1.  Mural edema affecting the duodenum and jejunum with some edema and mild lymph node enlargement in the subtending mesenteric and liquid stool throughout the colon where there is also borderline mural thickening. Findings would be compatible with a   nonspecific acute/active enterocolitis in the proper clinical setting.  2.  Diffuse hepatic steatosis and hepatomegaly.  3.  Infrarenal abdominal aortic aneurysm 3.6 x 3.2 cm diameter is unchanged.  4.  Faint hazy groundglass density opacity, interlobular septal thickening, bronchial wall thickening and thin bands of linear atelectasis throughout the visualized lungs.    Aorta recommendation: Recommend followup by ultrasound in 2 years.     XR Chest 2 Views    Narrative    EXAM: XR CHEST 2 VIEWS  LOCATION: [facility]  DATE: 5/18/2025    INDICATION: Fever of unknown origin.  COMPARISON: 4/27/2025.      Impression    IMPRESSION: Mild opacity at the left lung base is similar to the previous exam, and could be related to atelectasis or infection. The lungs are otherwise clear. Aortic calcification. Heart size and pulmonary vascularity are within normal limits. No   pleural effusions.       Medications   sodium chloride (PF) 0.9% PF flush 3 mL (has no administration in time range)   sodium chloride (PF) 0.9% PF flush 3 mL (3 mLs Intracatheter $Given 5/18/25 1150)   sodium chloride 0.9% BOLUS 1,000 mL (0  mLs Intravenous Stopped 5/18/25 1458)   ceFEPIme (MAXIPIME) 2 g vial to attach to  mL bag for ADULTS or NS 50 mL bag for PEDS (0 g Intravenous Stopped 5/18/25 1358)   metroNIDAZOLE (FLAGYL) infusion 500 mg (0 mg Intravenous Stopped 5/18/25 1358)   hyoscyamine (LEVSIN/SL) sublingual tablet 125 mcg (125 mcg Sublingual $Given 5/18/25 1254)   vancomycin (VANCOCIN) 2,000 mg in 0.9% NaCl 500 mL intermittent infusion (0 mg Intravenous Stopped 5/18/25 1725)   fluconazole (DIFLUCAN) intermittent infusion 400 mg (0 mg Intravenous Stopped 5/18/25 1725)   acyclovir (ZOVIRAX) capsule 400 mg (400 mg Oral $Given 5/18/25 1501)   iopamidol (ISOVUE-370) solution 103 mL (103 mLs Intravenous $Given 5/18/25 1527)   sodium chloride 0.9 % bag for CT scan flush (60 mLs Intravenous $Given 5/18/25 1527)   acetaminophen (TYLENOL) tablet 1,000 mg (1,000 mg Oral $Given 5/18/25 1533)       Assessments & Plan (with Medical Decision Making)     I have reviewed the nursing notes.    The patient has signs of sepsis   Sepsis ED evaluation   The patient has signs of sepsis as evidenced by:  1. Presence of 2 SIRS criteria, suspected infection, AND  2. Organ dysfunction: Sepsis work up in progress. Will continue to monitor for signs of organ dysfunction    Sepsis Care Initiation: Starting at 1215 PM on 05/18/25, until specified. Prior to this documentation, sepsis, severe sepsis, or septic shock was NOT thought to be a significant cause of illness. This order represents the first time infection was seriously considered to be affecting the patient.    Lactic Acid Results:  Recent Labs   Lab Test 05/18/25  1257 04/27/25  2048 03/19/25  1352   LACT 1.1 1.6 1.3       3 Hour Bundle 6 Hour Bundle (Reassessment)   Blood Cultures before IV Antibiotics: Yes  Antibiotics given: see below  Prehospital fluid volume (mL):                     Total fluids given (ED +Pre-hospital):  The patient responded to a lesser volume of IV fluids. The initial volume  ordered was 1000 mL.    Repeat Lactic Acid Level: Ordered by reflex for 2 hours after initial lactic acid collection.  Vasopressors: MAP>65 after initial IVF bolus, will continue to monitor fluid status and vital signs.  Repeat perfusion exam: I attest to having performed a repeat sepsis exam and assessment of perfusion at 1530 PM.   BMI Readings from Last 1 Encounters:   05/18/25 27.82 kg/m        Anti-infectives (From admission through now)      Start     Dose/Rate Route Frequency Ordered Stop    05/18/25 1430  fluconazole (DIFLUCAN) intermittent infusion 400 mg         400 mg  100 mL/hr over 120 Minutes Intravenous ONCE 05/18/25 1416 05/18/25 1725    05/18/25 1430  acyclovir (ZOVIRAX) capsule 400 mg         400 mg Oral ONCE 05/18/25 1416 05/18/25 1501    05/18/25 1225  vancomycin (VANCOCIN) 2,000 mg in 0.9% NaCl 500 mL intermittent infusion         2,000 mg  over 2 Hours Intravenous ONCE 05/18/25 1222 05/18/25 1725    05/18/25 1220  ceFEPIme (MAXIPIME) 2 g vial to attach to  mL bag for ADULTS or NS 50 mL bag for PEDS         2 g  over 30 Minutes Intravenous ONCE 05/18/25 1217 05/18/25 1358    05/18/25 1220  metroNIDAZOLE (FLAGYL) infusion 500 mg        Note to Pharmacy: Metronidazole IV may be dosed more frequently than Q12h for bacteremia, CNS infection and Clostridium difficile.    500 mg  over 60 Minutes Intravenous ONCE 05/18/25 1217 05/18/25 1358                    ED Course as of 05/18/25 1902   Sun May 18, 2025   1336 Paged [facility] oncology Dr Amanda. He states need to contact Dr Collins Malignant Hem.   1406 Spoke with Dr Collins - recommends adding home diflucan and acyclovir, using irridiated RBCs to be safe. Feels [facility] admisssion for now is appropriate, and we can reach out to her team any time with questions or later potential transfer. No steroids.   1618 Paged Dr Collins regarding critical low plts who recommends transfer. Will likely require numerous platelet units precluding appropriateness for  [facility] admission.   1658 Dr Dodson [facility] hospitalist accepts transfer. Awaiting bed. Ordered platelets.   1705 Chest x-ray and CRP and procalcitonin added per Dr. Rodríguez's request.        59 year old female with AML on chemotherapy evaluated for several days of diarrhea, once daily, and abdominal cramping in context of AML patient on chemotherapy.  Evaluation remarkable for worsening pancytopenia including hemoglobin 6.4 and platelets 4.  Transfusions of both of these are being performed.  Awaiting platelets currently prior to transfer to [facility] where she is accepted by Dr. Topher Rodríguez.  Remaining testing remarkable for enterocolitis on CT scan concerning for typhlitis.  Patient meeting SIRS and fever criteria.  Blood cultures ordered and broad-spectrum antibiotics along with antifungal and antiviral given.  Fluids also given.  Hyoscyamine given with significant improvement. C diff was negative (patient on prophylactic fluoroquinolone therapy). Patient is awaiting bed placement at [facility], and will be transferred once platelets arrive and can be started due to critical thrombocytopenia. Signed out to Dr Guallpa at shift change.    I have reviewed the findings, diagnosis, and plan with the patient.    Critical care time:  60 minutes of critical care time was spent with this patient, exclusive of all other separately billable services, including EKG/labs/imaging review, managing acute condition, and communications with specialists/hospitalists.     New Prescriptions    No medications on file       Final diagnoses:   Enterocolitis   Antineoplastic chemotherapy induced pancytopenia   Diarrhea, unspecified type       5/18/2025   [facility]     Rad Gold MD  05/18/25 1902       Rad Gold MD  05/18/25 3064

## 2025-05-18 NOTE — PROGRESS NOTES
Transfer Type: [facility]  Transfer Triage Note    Date of call: 05/18/25  Time of call: 4:53 PM    Current Patient Location:  [facility]  Current Level of Care: ED    Vitals: Temp: 101.3  F (38.5  C) Temp src: Oral BP: (!) 143/72 Pulse: 86   Resp: 28 SpO2: 95 %      O2 Device: None (Room air) at    Diagnosis: neutropenic fever, abdominal pain  Reason for requested transfer: Further diagnostic work up, management, and consultation for specialized care   Isolation Needs: Contact    Care everywhere has been updated and reviewed: N/A  Necessary images have been sent through PACS: N/A    If patient is transferring for specialty care or specific procedure, the specialist required has participated in the transfer call and agreed with need for transfer and anticipated timeline: No    Transfer accepted: Yes  Stability of Patient: Patient is vitally stable, with no critical labs, and will likely remain stable throughout the transfer process  Does the patient require placement on the [facility]? Yes. What specific [place] needs are anticipated? Hematology malignancy  Level of Care Needed: Med Surg  Telemetry Needed:  None  Expected Time of Arrival for Transfer: 0-8 hours  Arrival Location:  [facility]     Recommendations for Management and Stabilization: Not needed    Additional Comments: 60 yo woman with AML with recent stay for chemotherapy (HiDAC, cycle #2) discharged and was doing well until 3 days ago when she developed abdominal pain, diarrhea, and now fever.  Found to be febrile, VSS.      Given  Cefepime, vancomycin, metronidazole, acyclovir, and fluconazole.  Profoundly pancytopenic, given 1 unit PRBC, plts ordered but not given yet.    Plan transfer to [facility] for ongoing management, consideration of typhlitis vs other cause of fever.   Requested chest radiograph and PCT and CRP given tachypnea      Dennis from [facility].     CT:    IMPRESSION:   1.  Mural edema affecting the duodenum and jejunum with some edema and mild lymph node enlargement in the subtending mesenteric and liquid stool throughout the colon where there is also borderline mural thickening. Findings would be compatible with a   nonspecific acute/active enterocolitis in the proper clinical setting.  2.  Diffuse hepatic steatosis and hepatomegaly.  3.  Infrarenal abdominal aortic aneurysm 3.6 x 3.2 cm diameter is unchanged.  4.  Faint hazy groundglass density opacity, interlobular septal thickening, bronchial wall thickening and thin bands of linear atelectasis throughout the visualized lungs.      To notify the admitting provider that the patient has arrived at their destination:    For [facility]: Identify the correct provider on [employer]: [facility]OSPITALIST SERV[facility]/ ADULT [facility] then find title [facility] ED ADMISSIONS AND INTERNAL [facility] TRANSFERS [[id number]]. Use [employer] to contact the provider listed there.      Topher Rodríguez MD

## 2025-05-18 NOTE — ED NOTES
Patient sweating profusely.  She is awake and oriented.  IV antibiotics completed.  Blood now transfusing.  Awaiting platelets.  Sister at bedside.

## 2025-05-18 NOTE — ED TRIAGE NOTES
Pt arrives via private car with complaints of 3 day history of abdominal cramping, diarrhea, fevers (Tmax 102) and lethargy. Pt has leukemia and is currently undergoing monthly chemo treatments. Pt was discharged form the [facility] on May 9th. States she is on a dose of antibiotics but is unsure why. Had tylenol at 0900.      Triage Assessment (Adult)       Row Name 05/18/25 1151          Triage Assessment    Airway WDL WDL        Respiratory WDL    Respiratory WDL WDL        Skin Circulation/Temperature WDL    Skin Circulation/Temperature WDL WDL        Cardiac WDL    Cardiac WDL X;rhythm     Pulse Rate & Regularity tachycardic        Peripheral/Neurovascular WDL    Peripheral Neurovascular WDL WDL        Cognitive/Neuro/Behavioral WDL    Cognitive/Neuro/Behavioral WDL WDL        Iman Coma Scale    Best Eye Response 4-->(E4) spontaneous     Best Motor Response 6-->(M6) obeys commands     Best Verbal Response 5-->(V5) oriented     Iman Coma Scale Score 15

## 2025-05-18 NOTE — PHARMACY-VANCOMYCIN DOSING SERVICE
Pharmacy Vancomycin Initial Note  Date of Service May 18, 2025  Patient's  1965  59 year old, female    Indication: Intra-abdominal infection and Sepsis    Current estimated CrCl = Estimated Creatinine Clearance: 110.9 mL/min (based on SCr of 0.6 mg/dL).    Creatinine for last 3 days  5/15/2025: 12:50 PM Creatinine 0.60 mg/dL    Recent Vancomycin Level(s) for last 3 days  No results found for requested labs within last 3 days.      Vancomycin IV Administrations (past 72 hours)        No vancomycin orders with administrations in past 72 hours.                    Nephrotoxins and other renal medications (From now, onward)      Start     Dose/Rate Route Frequency Ordered Stop    25 1225  vancomycin (VANCOCIN) 2,000 mg in 0.9% NaCl 500 mL intermittent infusion         2,000 mg  over 2 Hours Intravenous ONCE 25 1222              Contrast Orders - past 72 hours (72h ago, onward)      None            [employer] Prediction of Planned Initial Vancomycin Regimen  Loading dose: 2000 mg at 13:00 2025.  Regimen: 1250 mg IV every 12 hours.  Start time: 01:00 on 2025  Exposure target: AUC24 (range)400-600 mg/L.hr   AUC24,ss: 514 mg/L.hr  Probability of AUC24 > 400: 74 %  Ctrough,ss: 15.4 mg/L  Probability of Ctrough,ss > 20: 30 %  Probability of nephrotoxicity (Lodise VANESSA ): 11 %          Plan:  Start vancomycin  2000 mg IV ONCE will add subsequent doses if admitted.   Vancomycin monitoring method: AUC  Vancomycin therapeutic monitoring goal: 400-600 mg*h/L  Pharmacy will check vancomycin levels as appropriate in 1-3 Days.    Serum creatinine levels will be ordered daily for the first week of therapy and at least twice weekly for subsequent weeks.      Amandeep Dean [facility]

## 2025-05-19 LAB
ACANTHOCYTES BLD QL SMEAR: SLIGHT
ADV 40+41 DNA STL QL NAA+NON-PROBE: NEGATIVE
ALBUMIN SERPL BCG-MCNC: 3.3 G/DL (ref 3.5–5.2)
ALBUMIN UR-MCNC: 30 MG/DL
ALP SERPL-CCNC: 131 U/L (ref 40–150)
ALT SERPL W P-5'-P-CCNC: 14 U/L (ref 0–50)
ANION GAP SERPL CALCULATED.3IONS-SCNC: 11 MMOL/L (ref 7–15)
APPEARANCE UR: CLEAR
APTT PPP: 32 SECONDS (ref 22–38)
AST SERPL W P-5'-P-CCNC: 14 U/L (ref 0–45)
ASTRO TYP 1-8 RNA STL QL NAA+NON-PROBE: NEGATIVE
BACTERIA #/AREA URNS HPF: ABNORMAL /HPF
BACTERIA SPT CULT: NORMAL
BASOPHILS # BLD AUTO: 0 10E3/UL (ref 0–0.2)
BASOPHILS # BLD MANUAL: 0 10E3/UL (ref 0–0.2)
BASOPHILS NFR BLD AUTO: 1 %
BASOPHILS NFR BLD MANUAL: 0 %
BILIRUB SERPL-MCNC: 1.1 MG/DL
BILIRUB UR QL STRIP: NEGATIVE
BLD PROD TYP BPU: NORMAL
BLD PROD TYP BPU: NORMAL
BLOOD COMPONENT TYPE: NORMAL
BLOOD COMPONENT TYPE: NORMAL
BUN SERPL-MCNC: 13.2 MG/DL (ref 6–20)
C CAYETANENSIS DNA STL QL NAA+NON-PROBE: NEGATIVE
C PNEUM DNA SPEC QL NAA+PROBE: NOT DETECTED
CALCIUM SERPL-MCNC: 9 MG/DL (ref 8.8–10.4)
CAMPYLOBACTER DNA SPEC NAA+PROBE: NEGATIVE
CHLORIDE SERPL-SCNC: 107 MMOL/L (ref 98–107)
CODING SYSTEM: NORMAL
CODING SYSTEM: NORMAL
COLOR UR AUTO: YELLOW
CREAT SERPL-MCNC: 0.53 MG/DL (ref 0.51–0.95)
CROSSMATCH: NORMAL
CROSSMATCH: NORMAL
CRP SERPL-MCNC: 346 MG/L
CRYPTOSP DNA STL QL NAA+NON-PROBE: NEGATIVE
DACRYOCYTES BLD QL SMEAR: SLIGHT
E COLI O157 DNA STL QL NAA+NON-PROBE: NORMAL
E HISTOLYT DNA STL QL NAA+NON-PROBE: NEGATIVE
EAEC ASTA GENE ISLT QL NAA+PROBE: NEGATIVE
EC STX1+STX2 GENES STL QL NAA+NON-PROBE: NEGATIVE
EGFRCR SERPLBLD CKD-EPI 2021: >90 ML/MIN/1.73M2
EOSINOPHIL # BLD AUTO: 0.1 10E3/UL (ref 0–0.7)
EOSINOPHIL # BLD MANUAL: 0 10E3/UL (ref 0–0.7)
EOSINOPHIL NFR BLD AUTO: 2 %
EOSINOPHIL NFR BLD MANUAL: 3 %
EPEC EAE GENE STL QL NAA+NON-PROBE: NEGATIVE
ERYTHROCYTE [DISTWIDTH] IN BLOOD BY AUTOMATED COUNT: 16.7 % (ref 10–15)
ERYTHROCYTE [DISTWIDTH] IN BLOOD BY AUTOMATED COUNT: 17 % (ref 10–15)
ETEC LTA+ST1A+ST1B TOX ST NAA+NON-PROBE: NEGATIVE
FIBRINOGEN PPP-MCNC: 831 MG/DL (ref 170–510)
FLUAV H1 2009 PAND RNA SPEC QL NAA+PROBE: NOT DETECTED
FLUAV H1 RNA SPEC QL NAA+PROBE: NOT DETECTED
FLUAV H3 RNA SPEC QL NAA+PROBE: NOT DETECTED
FLUAV RNA SPEC QL NAA+PROBE: NOT DETECTED
FLUBV RNA SPEC QL NAA+PROBE: NOT DETECTED
G LAMBLIA DNA STL QL NAA+NON-PROBE: NEGATIVE
GLUCOSE SERPL-MCNC: 102 MG/DL (ref 70–99)
GLUCOSE UR STRIP-MCNC: NEGATIVE MG/DL
GRAM STAIN RESULT: NORMAL
HADV DNA SPEC QL NAA+PROBE: NOT DETECTED
HCO3 SERPL-SCNC: 20 MMOL/L (ref 22–29)
HCOV PNL SPEC NAA+PROBE: DETECTED
HCT VFR BLD AUTO: 19.9 % (ref 35–47)
HCT VFR BLD AUTO: 23.2 % (ref 35–47)
HGB BLD-MCNC: 6.8 G/DL (ref 11.7–15.7)
HGB BLD-MCNC: 7.8 G/DL (ref 11.7–15.7)
HGB UR QL STRIP: ABNORMAL
HMPV RNA SPEC QL NAA+PROBE: NOT DETECTED
HPIV1 RNA SPEC QL NAA+PROBE: NOT DETECTED
HPIV2 RNA SPEC QL NAA+PROBE: NOT DETECTED
HPIV3 RNA SPEC QL NAA+PROBE: NOT DETECTED
HPIV4 RNA SPEC QL NAA+PROBE: NOT DETECTED
IMM GRANULOCYTES # BLD: 0.1 10E3/UL
IMM GRANULOCYTES NFR BLD: 2 %
INR PPP: 1.25 (ref 0.85–1.15)
ISSUE DATE AND TIME: NORMAL
KETONES UR STRIP-MCNC: NEGATIVE MG/DL
LDH SERPL L TO P-CCNC: 185 U/L (ref 0–250)
LEUKOCYTE ESTERASE UR QL STRIP: NEGATIVE
LYMPHOCYTES # BLD AUTO: 0.3 10E3/UL (ref 0.8–5.3)
LYMPHOCYTES # BLD MANUAL: 0.3 10E3/UL (ref 0.8–5.3)
LYMPHOCYTES NFR BLD AUTO: 16 %
LYMPHOCYTES NFR BLD MANUAL: 23 %
M PNEUMO DNA SPEC QL NAA+PROBE: NOT DETECTED
MAGNESIUM SERPL-MCNC: 2 MG/DL (ref 1.7–2.3)
MAGNESIUM SERPL-MCNC: 2 MG/DL (ref 1.7–2.3)
MCH RBC QN AUTO: 28.9 PG (ref 26.5–33)
MCH RBC QN AUTO: 29.1 PG (ref 26.5–33)
MCHC RBC AUTO-ENTMCNC: 33.6 G/DL (ref 31.5–36.5)
MCHC RBC AUTO-ENTMCNC: 34.2 G/DL (ref 31.5–36.5)
MCV RBC AUTO: 85 FL (ref 78–100)
MCV RBC AUTO: 87 FL (ref 78–100)
METAMYELOCYTES # BLD MANUAL: 0 10E3/UL
METAMYELOCYTES NFR BLD MANUAL: 2 %
MONOCYTES # BLD AUTO: 0.5 10E3/UL (ref 0–1.3)
MONOCYTES # BLD MANUAL: 0.2 10E3/UL (ref 0–1.3)
MONOCYTES NFR BLD AUTO: 21 %
MONOCYTES NFR BLD MANUAL: 16 %
MRSA DNA SPEC QL NAA+PROBE: NEGATIVE
MUCOUS THREADS #/AREA URNS LPF: PRESENT /LPF
NEUTROPHILS # BLD AUTO: 1.3 10E3/UL (ref 1.6–8.3)
NEUTROPHILS # BLD MANUAL: 0.8 10E3/UL (ref 1.6–8.3)
NEUTROPHILS NFR BLD AUTO: 58 %
NEUTROPHILS NFR BLD MANUAL: 56 %
NITRATE UR QL: NEGATIVE
NOROVIRUS GI+II RNA STL QL NAA+NON-PROBE: NEGATIVE
NRBC # BLD AUTO: 0 10E3/UL
NRBC BLD AUTO-RTO: 0 /100
P SHIGELLOIDES DNA STL QL NAA+NON-PROBE: NEGATIVE
PH UR STRIP: 6 [PH] (ref 5–7)
PHOSPHATE SERPL-MCNC: 2.4 MG/DL (ref 2.5–4.5)
PHOSPHATE SERPL-MCNC: 2.9 MG/DL (ref 2.5–4.5)
PLAT MORPH BLD: ABNORMAL
PLAT MORPH BLD: ABNORMAL
PLATELET # BLD AUTO: 37 10E3/UL (ref 150–450)
PLATELET # BLD AUTO: 48 10E3/UL (ref 150–450)
POTASSIUM SERPL-SCNC: 3 MMOL/L (ref 3.4–5.3)
POTASSIUM SERPL-SCNC: 3.2 MMOL/L (ref 3.4–5.3)
PROT SERPL-MCNC: 6.2 G/DL (ref 6.4–8.3)
PROTHROMBIN TIME: 16 SECONDS (ref 11.8–14.8)
RBC # BLD AUTO: 2.35 10E6/UL (ref 3.8–5.2)
RBC # BLD AUTO: 2.68 10E6/UL (ref 3.8–5.2)
RBC MORPH BLD: ABNORMAL
RBC MORPH BLD: ABNORMAL
RBC URINE: 15 /HPF
RSV RNA SPEC QL NAA+PROBE: NOT DETECTED
RSV RNA SPEC QL NAA+PROBE: NOT DETECTED
RV+EV RNA SPEC QL NAA+PROBE: NOT DETECTED
RVA RNA STL QL NAA+NON-PROBE: NEGATIVE
SA TARGET DNA: NEGATIVE
SALMONELLA SP RPOD STL QL NAA+PROBE: NEGATIVE
SAPO I+II+IV+V RNA STL QL NAA+NON-PROBE: NEGATIVE
SHIGELLA SP+EIEC IPAH ST NAA+NON-PROBE: NEGATIVE
SODIUM SERPL-SCNC: 138 MMOL/L (ref 135–145)
SP GR UR STRIP: 1.02 (ref 1–1.03)
SQUAMOUS EPITHELIAL: 3 /HPF
TARGETS BLD QL SMEAR: SLIGHT
TOXIC GRANULES BLD QL SMEAR: PRESENT
TOXIC GRANULES BLD QL SMEAR: PRESENT
UNIT ABO/RH: NORMAL
UNIT ABO/RH: NORMAL
UNIT NUMBER: NORMAL
UNIT NUMBER: NORMAL
UNIT STATUS: NORMAL
UNIT STATUS: NORMAL
UNIT TYPE ISBT: 600
UNIT TYPE ISBT: 6200
URATE SERPL-MCNC: 3.6 MG/DL (ref 2.4–5.7)
UROBILINOGEN UR STRIP-MCNC: NORMAL MG/DL
V CHOLERAE DNA SPEC QL NAA+PROBE: NEGATIVE
VARIANT LYMPHS BLD QL SMEAR: PRESENT
VIBRIO DNA SPEC NAA+PROBE: NEGATIVE
WBC # BLD AUTO: 1.5 10E3/UL (ref 4–11)
WBC # BLD AUTO: 2.2 10E3/UL (ref 4–11)
WBC URINE: 4 /HPF
Y ENTEROCOL DNA STL QL NAA+PROBE: NEGATIVE

## 2025-05-19 PROCEDURE — 87070 CULTURE OTHR SPECIMN AEROBIC: CPT

## 2025-05-19 PROCEDURE — 80053 COMPREHEN METABOLIC PANEL: CPT

## 2025-05-19 PROCEDURE — 84100 ASSAY OF PHOSPHORUS: CPT

## 2025-05-19 PROCEDURE — 87507 IADNA-DNA/RNA PROBE TQ 12-25: CPT

## 2025-05-19 PROCEDURE — 81001 URINALYSIS AUTO W/SCOPE: CPT

## 2025-05-19 PROCEDURE — 87086 URINE CULTURE/COLONY COUNT: CPT

## 2025-05-19 PROCEDURE — 87633 RESP VIRUS 12-25 TARGETS: CPT

## 2025-05-19 PROCEDURE — 36415 COLL VENOUS BLD VENIPUNCTURE: CPT

## 2025-05-19 PROCEDURE — 86140 C-REACTIVE PROTEIN: CPT

## 2025-05-19 PROCEDURE — 36415 COLL VENOUS BLD VENIPUNCTURE: CPT | Performed by: STUDENT IN AN ORGANIZED HEALTH CARE EDUCATION/TRAINING PROGRAM

## 2025-05-19 PROCEDURE — 250N000013 HC RX MED GY IP 250 OP 250 PS 637: Performed by: STUDENT IN AN ORGANIZED HEALTH CARE EDUCATION/TRAINING PROGRAM

## 2025-05-19 PROCEDURE — 84132 ASSAY OF SERUM POTASSIUM: CPT | Performed by: STUDENT IN AN ORGANIZED HEALTH CARE EDUCATION/TRAINING PROGRAM

## 2025-05-19 PROCEDURE — 258N000003 HC RX IP 258 OP 636

## 2025-05-19 PROCEDURE — 85004 AUTOMATED DIFF WBC COUNT: CPT

## 2025-05-19 PROCEDURE — 120N000002 HC R&B MED SURG/OB UMMC

## 2025-05-19 PROCEDURE — 250N000011 HC RX IP 250 OP 636: Mod: JZ

## 2025-05-19 PROCEDURE — 250N000013 HC RX MED GY IP 250 OP 250 PS 637

## 2025-05-19 PROCEDURE — 87641 MR-STAPH DNA AMP PROBE: CPT

## 2025-05-19 PROCEDURE — 83735 ASSAY OF MAGNESIUM: CPT

## 2025-05-19 PROCEDURE — P9040 RBC LEUKOREDUCED IRRADIATED: HCPCS

## 2025-05-19 RX ORDER — LOPERAMIDE HYDROCHLORIDE 2 MG/1
4 CAPSULE ORAL 3 TIMES DAILY PRN
Status: DISCONTINUED | OUTPATIENT
Start: 2025-05-19 | End: 2025-05-20 | Stop reason: HOSPADM

## 2025-05-19 RX ORDER — ALBUTEROL SULFATE 0.83 MG/ML
2.5 SOLUTION RESPIRATORY (INHALATION) 4 TIMES DAILY PRN
Status: DISCONTINUED | OUTPATIENT
Start: 2025-05-19 | End: 2025-05-20 | Stop reason: HOSPADM

## 2025-05-19 RX ORDER — POTASSIUM CHLORIDE 750 MG/1
20 TABLET, EXTENDED RELEASE ORAL ONCE
Status: COMPLETED | OUTPATIENT
Start: 2025-05-19 | End: 2025-05-19

## 2025-05-19 RX ORDER — PAROXETINE 20 MG/1
20 TABLET, FILM COATED ORAL EVERY MORNING
Status: DISCONTINUED | OUTPATIENT
Start: 2025-05-19 | End: 2025-05-20 | Stop reason: HOSPADM

## 2025-05-19 RX ORDER — POTASSIUM CHLORIDE 750 MG/1
40 TABLET, EXTENDED RELEASE ORAL ONCE
Status: COMPLETED | OUTPATIENT
Start: 2025-05-19 | End: 2025-05-19

## 2025-05-19 RX ORDER — SUMATRIPTAN 50 MG/1
50 TABLET, FILM COATED ORAL
Status: DISCONTINUED | OUTPATIENT
Start: 2025-05-19 | End: 2025-05-20 | Stop reason: HOSPADM

## 2025-05-19 RX ORDER — ALBUTEROL SULFATE 90 UG/1
1-2 INHALANT RESPIRATORY (INHALATION) 4 TIMES DAILY PRN
Status: DISCONTINUED | OUTPATIENT
Start: 2025-05-19 | End: 2025-05-20 | Stop reason: HOSPADM

## 2025-05-19 RX ORDER — ROSUVASTATIN CALCIUM 10 MG/1
20 TABLET, COATED ORAL DAILY
Status: DISCONTINUED | OUTPATIENT
Start: 2025-05-19 | End: 2025-05-20 | Stop reason: HOSPADM

## 2025-05-19 RX ORDER — POTASSIUM CHLORIDE 1500 MG/1
20 TABLET, EXTENDED RELEASE ORAL DAILY
COMMUNITY

## 2025-05-19 RX ORDER — POSACONAZOLE 100 MG/1
300 TABLET, DELAYED RELEASE ORAL EVERY MORNING
Status: DISCONTINUED | OUTPATIENT
Start: 2025-05-19 | End: 2025-05-20 | Stop reason: HOSPADM

## 2025-05-19 RX ORDER — VITAMIN B COMPLEX
25 TABLET ORAL DAILY
Status: DISCONTINUED | OUTPATIENT
Start: 2025-05-19 | End: 2025-05-20 | Stop reason: HOSPADM

## 2025-05-19 RX ORDER — IPRATROPIUM BROMIDE AND ALBUTEROL SULFATE 2.5; .5 MG/3ML; MG/3ML
1 SOLUTION RESPIRATORY (INHALATION) EVERY 4 HOURS PRN
Status: DISCONTINUED | OUTPATIENT
Start: 2025-05-19 | End: 2025-05-20 | Stop reason: HOSPADM

## 2025-05-19 RX ORDER — MULTIVITAMIN WITH IRON
500 TABLET ORAL DAILY
Status: DISCONTINUED | OUTPATIENT
Start: 2025-05-19 | End: 2025-05-20 | Stop reason: HOSPADM

## 2025-05-19 RX ORDER — SALIVA STIMULANT COMB. NO.3
2 SPRAY, NON-AEROSOL (ML) MUCOUS MEMBRANE
Status: DISCONTINUED | OUTPATIENT
Start: 2025-05-19 | End: 2025-05-20 | Stop reason: HOSPADM

## 2025-05-19 RX ORDER — FLUTICASONE FUROATE AND VILANTEROL 200; 25 UG/1; UG/1
1 POWDER RESPIRATORY (INHALATION) DAILY
Status: DISCONTINUED | OUTPATIENT
Start: 2025-05-19 | End: 2025-05-20 | Stop reason: HOSPADM

## 2025-05-19 RX ORDER — FLUTICASONE PROPIONATE 50 MCG
2 SPRAY, SUSPENSION (ML) NASAL 2 TIMES DAILY
Status: DISCONTINUED | OUTPATIENT
Start: 2025-05-19 | End: 2025-05-20 | Stop reason: HOSPADM

## 2025-05-19 RX ORDER — HYDROXYZINE HYDROCHLORIDE 25 MG/1
25-50 TABLET, FILM COATED ORAL EVERY 6 HOURS PRN
Status: DISCONTINUED | OUTPATIENT
Start: 2025-05-19 | End: 2025-05-20 | Stop reason: HOSPADM

## 2025-05-19 RX ORDER — LORATADINE 10 MG/1
10 TABLET ORAL DAILY
Status: DISCONTINUED | OUTPATIENT
Start: 2025-05-19 | End: 2025-05-20 | Stop reason: HOSPADM

## 2025-05-19 RX ORDER — CYCLOBENZAPRINE HCL 5 MG
10 TABLET ORAL 3 TIMES DAILY PRN
Status: DISCONTINUED | OUTPATIENT
Start: 2025-05-19 | End: 2025-05-20 | Stop reason: HOSPADM

## 2025-05-19 RX ORDER — PANTOPRAZOLE SODIUM 40 MG/1
40 TABLET, DELAYED RELEASE ORAL
Status: DISCONTINUED | OUTPATIENT
Start: 2025-05-19 | End: 2025-05-20 | Stop reason: HOSPADM

## 2025-05-19 RX ORDER — ACYCLOVIR 400 MG/1
400 TABLET ORAL 2 TIMES DAILY
Status: DISCONTINUED | OUTPATIENT
Start: 2025-05-19 | End: 2025-05-20 | Stop reason: HOSPADM

## 2025-05-19 RX ORDER — FAMOTIDINE 20 MG/1
20 TABLET, FILM COATED ORAL 2 TIMES DAILY
Status: DISCONTINUED | OUTPATIENT
Start: 2025-05-19 | End: 2025-05-20 | Stop reason: HOSPADM

## 2025-05-19 RX ORDER — SUCRALFATE ORAL 1 G/10ML
1 SUSPENSION ORAL
Status: DISCONTINUED | OUTPATIENT
Start: 2025-05-19 | End: 2025-05-20 | Stop reason: HOSPADM

## 2025-05-19 RX ORDER — CALCIUM CARBONATE 500 MG/1
500 TABLET, CHEWABLE ORAL 3 TIMES DAILY PRN
Status: DISCONTINUED | OUTPATIENT
Start: 2025-05-19 | End: 2025-05-20 | Stop reason: HOSPADM

## 2025-05-19 RX ADMIN — FLUTICASONE FUROATE AND VILANTEROL TRIFENATATE 1 PUFF: 200; 25 POWDER RESPIRATORY (INHALATION) at 09:44

## 2025-05-19 RX ADMIN — FAMOTIDINE 20 MG: 20 TABLET, FILM COATED ORAL at 20:21

## 2025-05-19 RX ADMIN — CEFEPIME 2 G: 2 INJECTION, POWDER, FOR SOLUTION INTRAVENOUS at 16:22

## 2025-05-19 RX ADMIN — CEFEPIME 2 G: 2 INJECTION, POWDER, FOR SOLUTION INTRAVENOUS at 00:24

## 2025-05-19 RX ADMIN — GABAPENTIN 400 MG: 300 CAPSULE ORAL at 20:21

## 2025-05-19 RX ADMIN — GABAPENTIN 400 MG: 300 CAPSULE ORAL at 14:14

## 2025-05-19 RX ADMIN — POTASSIUM CHLORIDE 20 MEQ: 750 TABLET, EXTENDED RELEASE ORAL at 14:14

## 2025-05-19 RX ADMIN — LORATADINE 10 MG: 10 TABLET ORAL at 09:42

## 2025-05-19 RX ADMIN — POTASSIUM CHLORIDE 40 MEQ: 750 TABLET, EXTENDED RELEASE ORAL at 22:09

## 2025-05-19 RX ADMIN — METRONIDAZOLE 500 MG: 500 INJECTION, SOLUTION INTRAVENOUS at 09:44

## 2025-05-19 RX ADMIN — ROSUVASTATIN CALCIUM 20 MG: 10 TABLET, FILM COATED ORAL at 09:42

## 2025-05-19 RX ADMIN — SUCRALFATE 1 G: 1 SUSPENSION ORAL at 13:01

## 2025-05-19 RX ADMIN — METRONIDAZOLE 500 MG: 500 INJECTION, SOLUTION INTRAVENOUS at 01:19

## 2025-05-19 RX ADMIN — PAROXETINE HYDROCHLORIDE 20 MG: 20 TABLET, FILM COATED ORAL at 09:44

## 2025-05-19 RX ADMIN — Medication 25 MCG: at 09:42

## 2025-05-19 RX ADMIN — SUCRALFATE 1 G: 1 SUSPENSION ORAL at 21:24

## 2025-05-19 RX ADMIN — PANTOPRAZOLE SODIUM 40 MG: 40 TABLET, DELAYED RELEASE ORAL at 09:42

## 2025-05-19 RX ADMIN — FLUTICASONE PROPIONATE 2 SPRAY: 50 SPRAY, METERED NASAL at 09:44

## 2025-05-19 RX ADMIN — CEFEPIME 2 G: 2 INJECTION, POWDER, FOR SOLUTION INTRAVENOUS at 09:44

## 2025-05-19 RX ADMIN — PANTOPRAZOLE SODIUM 40 MG: 40 TABLET, DELAYED RELEASE ORAL at 16:22

## 2025-05-19 RX ADMIN — SODIUM CHLORIDE: 0.9 INJECTION, SOLUTION INTRAVENOUS at 01:19

## 2025-05-19 RX ADMIN — ACETAMINOPHEN 650 MG: 325 TABLET ORAL at 21:23

## 2025-05-19 RX ADMIN — METRONIDAZOLE 500 MG: 500 INJECTION, SOLUTION INTRAVENOUS at 17:33

## 2025-05-19 RX ADMIN — POTASSIUM CHLORIDE 40 MEQ: 750 TABLET, EXTENDED RELEASE ORAL at 10:50

## 2025-05-19 RX ADMIN — GABAPENTIN 400 MG: 300 CAPSULE ORAL at 09:42

## 2025-05-19 RX ADMIN — POTASSIUM & SODIUM PHOSPHATES POWDER PACK 280-160-250 MG 1 PACKET: 280-160-250 PACK at 13:02

## 2025-05-19 RX ADMIN — ACYCLOVIR 400 MG: 400 TABLET ORAL at 09:42

## 2025-05-19 RX ADMIN — POTASSIUM & SODIUM PHOSPHATES POWDER PACK 280-160-250 MG 1 PACKET: 280-160-250 PACK at 09:44

## 2025-05-19 RX ADMIN — ACYCLOVIR 400 MG: 400 TABLET ORAL at 20:21

## 2025-05-19 RX ADMIN — SUCRALFATE 1 G: 1 SUSPENSION ORAL at 17:38

## 2025-05-19 RX ADMIN — CYCLOBENZAPRINE HYDROCHLORIDE 10 MG: 5 TABLET, FILM COATED ORAL at 16:34

## 2025-05-19 RX ADMIN — CYANOCOBALAMIN TAB 500 MCG 500 MCG: 500 TAB at 09:42

## 2025-05-19 RX ADMIN — SUCRALFATE 1 G: 1 SUSPENSION ORAL at 09:42

## 2025-05-19 RX ADMIN — POTASSIUM & SODIUM PHOSPHATES POWDER PACK 280-160-250 MG 1 PACKET: 280-160-250 PACK at 16:22

## 2025-05-19 RX ADMIN — FAMOTIDINE 20 MG: 20 TABLET, FILM COATED ORAL at 09:42

## 2025-05-19 RX ADMIN — ACETAMINOPHEN 650 MG: 325 TABLET ORAL at 04:32

## 2025-05-19 RX ADMIN — FLUTICASONE PROPIONATE 2 SPRAY: 50 SPRAY, METERED NASAL at 20:21

## 2025-05-19 RX ADMIN — POSACONAZOLE 300 MG: 100 TABLET, DELAYED RELEASE ORAL at 09:44

## 2025-05-19 ASSESSMENT — ACTIVITIES OF DAILY LIVING (ADL)
ADLS_ACUITY_SCORE: 62
ADLS_ACUITY_SCORE: 45
ADLS_ACUITY_SCORE: 56
ADLS_ACUITY_SCORE: 62
DRESSING/BATHING_DIFFICULTY: NO
VISION_MANAGEMENT: GLASSES
ADLS_ACUITY_SCORE: 54
ADLS_ACUITY_SCORE: 62
ADLS_ACUITY_SCORE: 62
DOING_ERRANDS_INDEPENDENTLY_DIFFICULTY: OTHER (SEE COMMENTS)
WALKING_OR_CLIMBING_STAIRS_DIFFICULTY: YES
ADLS_ACUITY_SCORE: 45
WALKING_OR_CLIMBING_STAIRS: AMBULATION DIFFICULTY, REQUIRES EQUIPMENT
WEAR_GLASSES_OR_BLIND: YES
ADLS_ACUITY_SCORE: 56
ADLS_ACUITY_SCORE: 62
ADLS_ACUITY_SCORE: 56
ADLS_ACUITY_SCORE: 56
ADLS_ACUITY_SCORE: 45
CHANGE_IN_FUNCTIONAL_STATUS_SINCE_ONSET_OF_CURRENT_ILLNESS/INJURY: YES
ADLS_ACUITY_SCORE: 62
ADLS_ACUITY_SCORE: 45
ADLS_ACUITY_SCORE: 56
ADLS_ACUITY_SCORE: 56
CONCENTRATING,_REMEMBERING_OR_MAKING_DECISIONS_DIFFICULTY: NO
DEPENDENT_IADLS:: INDEPENDENT
ADLS_ACUITY_SCORE: 56
TOILETING_ISSUES: NO
FALL_HISTORY_WITHIN_LAST_SIX_MONTHS: NO
ADLS_ACUITY_SCORE: 56
ADLS_ACUITY_SCORE: 45
ADLS_ACUITY_SCORE: 56
ADLS_ACUITY_SCORE: 54
DIFFICULTY_COMMUNICATING: NO
DIFFICULTY_EATING/SWALLOWING: NO
HEARING_DIFFICULTY_OR_DEAF: NO
ADLS_ACUITY_SCORE: 45

## 2025-05-19 NOTE — ED NOTES
Flight arrives, report given, moved to flight monitors.  Family at bedside.  Platelets infusion upon transfer.

## 2025-05-19 NOTE — ED NOTES
Call received from [facility] stating that they called [employer] for mutual aid and no rigs available. They stated that there were no rigs available from [employer] in [place] or the [facility] to send.

## 2025-05-19 NOTE — PROGRESS NOTES
Nursing Focus: Admission    D: Patient admitted/transferred from [facility] via [facility] for neutropenic fever.      I: Upon arrival to the unit patient was oriented to room, unit, and call light. Patient s height, weight, and vital signs were obtained. Allergies reviewed and allergy band applied. MD notified of patient s arrival on the unit. Adult AVS completed  by bedside RN Head to toe assessment completed. Education assessment completed. Care plan initiated.     A: Vital signs stable upon admission. Patient rates pain at 3/10. Two RN skin assessment completed Yes. Second RN was Gaye BURGOS Significant Skin Findings include n/a. [facility] Nurse Consult Ordered  No. Bed Algorithm can be found in PCS flow sheets (Support Surface Algorithm) and on IP [facility] NURSE RESOURCE TAB, was this used during this assessment?  No.     P: Continue to monitor patient and intervene as needed. Continue with plan of care. Notify MD with any concerns or changes in patient status.

## 2025-05-19 NOTE — PROGRESS NOTES
"[facility]    Hematology / Oncology Progress Note    Patient: Alejandra Villegas  MRN: [id number]  Admission Date: 5/18/2025  Date of Service (when I saw the patient): 05/19/2025  Hospital Day # 1     Assessment & Plan   Alejandra Villegas is a 59 year old female with a past medical history significant for COPD, migraines, rheumatoid arthritis, aortic stenosis, afib, anxiety, and AML with NPM1, IDH2, and NRAS mutations, most recently s/p HiDAC consolidation (C2D1=5/6/2025). She presented to OSH ED on 5/18 with neutropenic fever, diarrhea, and abdominal pain and was found to have colitis on CT and RVP positive for coronavirus.     ID   # Neutropenic fever  # Neutropenic enterocolitis  # Coronavirus (not COVID-19)  Patient presented to OSH ED on 5/18/25 with 3 days of abdominal cramping, described as intermittently sharp and periumbilical. Had also developed diarrhea that had been becoming increasingly watery. He had recorded temp at home of 102.5, thus presented to ED, where she reports she was \"out of it\" and weak. She denies nausea, bloating. Good PO intake. Patient also reported mild cough and congestion. Work-up showed RVP positive for coronavirus and CT A/P with \"mural edema affecting the duodenum and jejunum with some edema and mild lymph node enlargement in the subtending mesenteric and liquid stool throughout the colon where there is also borderline mural thickening. Findings would be compatible with a nonspecific acute/active enterocolitis in the proper clinical setting.\" On 5/19 after transfer to [facility], patient feels significantly improved after a few doses of IV antibiotics. She is ambulating around unit and outside, with improvement in strength and normalization of mental status. Received Neulasta on 5/12.   - Work up:   - BCx NGTD  - Sputum culture negative  - RVP + coronavirus  - COVID/flu/RSV negative  - UA with blood, no e/o infection  - Cdiff negative  - " "MRSA nares negative  - Lactic 1.1  - Lipase 11  - Procal 0.52  -   - CXR: \"Mild opacity at the left lung base is similar to the previous exam, and could be related to atelectasis or infection. The lungs are otherwise clear.\"  - CT A/P:   - Mural edema affecting the duodenum and jejunum with some edema and mild lymph node enlargement in the subtending mesenteric and liquid stool throughout the colon where there is also borderline mural thickening. Findings would be compatible with a nonspecific acute/active enterocolitis in the proper clinical setting.  - Diffuse hepatic steatosis and hepatomegaly.  - Infrarenal abdominal aortic aneurysm 3.6 x 3.2 cm diameter is unchanged.  - Faint hazy groundglass density opacity, interlobular septal thickening, bronchial wall thickening and thin bands of linear atelectasis throughout the visualized lungs.  - Pending: enteric panel  - Antibiotics:   - Cefepime x5/18  - Flagyl x5/18  - Given significant clinical improvement after initiation of antibiotics, and improvement in ANC, will consider transition to oral antibiotics as soon as tomorrow pending clinic course.   - Given rapid improvement in symptoms, will defer NPO status, surgery consultation. If worsening GI symptoms, will consider surgery consult.    # ID prophylaxis  - Acyclovir 400 mg BID  - Levaquin 250 mg daily - hold while on treatment-dose antibiotics as above  - Posaconazole 300 mg daily     HEME  # AML with NPM1 mutation  Follows with Kwan Harris and [facility]. Had been seen by PCP Dec 2024 w/ progressive fatigue and nausea where she was found leukopenic WBC  2.4 and neutropenic w/ ANC 0.3. Hgb 12. Was referred to local hematology/oncology clinic for further evaluation and seen by Dr. Samina Harris. BMBx 2/10/25 done at OSH showed hypercellular marrow w/ trilineage hematopoiesis w/ dyspoiesis with mildly elevated blasts of 4% on morphology. NGS w/ NPM1, IDH2, and NRAS mutations. She was admitted to [facility] 2/26/25 for " further workup and management. Slides were re-reviewed by [facility], who noted hypercellular marrow with 8% blasts by morphology. Despite relatively low blast percentage, findings were felt most consistent with a diagnosis AML with NPM1 mutation by WHO 2022 (which does not require a specific blast threshold). Following interdepartmental discussions and review of the available literature, patient was started on intensive induction with 7+3 (Day 1=3/5/25). Midcycle BMBx 3/19/25 with no morphologic or immunophenotypic evidence of AML. D28 BMBx 3/31/25 with hypercellular marrow (60 to 70%) with no overt dysplasia or increase in blasts, noted flow with 4.3% blasts of unusual phenotype. Due to overall disposition timeline as well as patient living in [facility], preceded directly with consolidation chemotherapy with HiDAC (C1D1=4/3/25) which she tolerated well. Now, s/p C2 HiDAC (C2D1=5/6/2025).   - BMT consult on 4/16, no immediate plans for bone marrow transplant.     # Pancytopenia  Secondary to recent chemotherapy.   - Monitor CBC daily  - Transfuse to keep Hgb >7, plt >10K     GI  # GERD  # History of intermittent epigastric pain  CT C/A/P 4/14/2025 with evidence of esophagitis (thought due to reflux/recent chemotherapy) and small hiatal hernia. Symptoms have historically improved on maximal medical therapy as below.  - Continue PTA Protonix BID, Pepcid BID, Carafate QID, TUMS PRN     PULM  # COPD  Longstanding history of COPD. No recent exacerbations. Most recent PFTs (2018) were normal.  - Continue PTA Breo Ellipta inhaler and PRN DuoNebs     # Tobacco use disorder  Has smoked since age 16, 1.5 ppd (roughly 65 pack years). Attempting to cut back as recently as 01/2025 to 0.5 ppd. Has historically declined any nicotine replacement or pharmacologic assistance. Tried nicotine patch in March 2025 that patient feels lead to anxiety attack.   - Encourage smoking cessation  - Continue posaconazole ppx,  regardless of ANC, given high risk for fungal pulmonary infection in the setting of ongoing tobacco abuse.     CV  # Essential hypertension  - Previously on lisinopril 10 mg, which was discontinued due to well managed blood pressures during prior admission     # Infrarenal abdominal aortic aneurysm  Noted incidentally on CT C/A/P (3/19/25), measuring 3.4 cm in diameter.  - Attention on follow-up imaging     RENAL/FEN  # Stage 2 CKD  Baseline Cr ~0.7. On admission Cr 0.65.   - Continue to monitor Cr    # Hyponatremia, resolved  Na 129 on admission, improved s/p mIVF of NS overnight. Likely hypovolemic hyponatremia.   - Monitor on daily CMP    # Hypophosphatemia  - Replete per protocol     NEURO  # Chronic migraine w/o aura  # Chronic headaches  Present since childhood. Reportedly triggered by neck manipulation/movement. Reportedly well-managed with regimen below. Has never had LP but does not possess any high risk features for CNS involvement.   - Continue PTA, Tylenol, sumatriptan, and cyclobenzaprine PRN     MISC   # BROOKS  # MDD  - Continue PTA Paxil daily and Atarax PRN      # Degenerative disc disease  Appears likely multi-level; no MRI available for review, though note that patient has previously had epidural injections at L3-4 and L5-S1 (2016). Reported taking gabapentin 400 mg TID PRN prior to admission.   - Continue gabapentin 400 mg TID     Chronic Problems:  # Vitamin D deficiency - Continue PTA vit D supplement  # Seasonal allergies - Continue PTA Claritin   # Rheumatoid arthritis - Patient reported trying treatment though was unable to tolerate well. Managed with Tylenol PRN. Most recent RF >650 (2/10/25). JI negative.    # Prediabetes - Last A1c 6.0 x12/9/24. Has been managing with lifestyle modifications.   # Mixed hyperlipidemia - Continue PTA rosuvastatin  # H/o aortic stenosis - Patient noted on admission longstanding history of aortic stenosis. Pre-chemo echo w/ EF 60-65% and mild aortic stenosis  "and insufficiency. No previous echo to compare.    Clinically Significant Risk Factors Present on Admission        # Hypokalemia: Lowest K = 3 mmol/L in last 2 days, will replace as needed  # Hyponatremia: Lowest Na = 129 mmol/L in last 2 days, will monitor as appropriate  # Hypochloremia: Lowest Cl = 94 mmol/L in last 2 days, will monitor as appropriate      # Hypoalbuminemia: Lowest albumin = 3.3 g/dL at 5/19/2025  6:39 AM, will monitor as appropriate  # Coagulation Defect: INR = 1.25 (Ref range: 0.85 - 1.15) and/or PTT = 32 Seconds (Ref range: 22 - 38 Seconds), will monitor for bleeding  # Thrombocytopenia: Lowest platelets = 4 in last 2 days, will monitor for bleeding   # Hypertension: Noted on problem list      # Anemia: based on hgb <11       # Overweight: Estimated body mass index is 27.31 kg/m  as calculated from the following:    Height as of this encounter: 1.702 m (5' 7\").    Weight as of this encounter: 79.1 kg (174 lb 6.4 oz).       # Financial/Environmental Concerns:    # Asthma: noted on problem list          FEN  Diet: Regular Diet Adult   IVF: Bolus PRN   Lytes: Replete per protocol    PPX  VTE: None given thrombocytopenia  Bowel: Senna/MiraLax PRN  GI/PUD: See above    MISC  Code Status: Full Code   Lines/Drains: PIV  Medically Ready for Discharge: Anticipated in 2-4 Days  Dispo: Plan to discharge home pending fever curve, symptoms, and antibiotic plan  Follow Up: Follows with Dr. Harris ([facility]). Appears to be scheduled for local labs, will confirm PRN transfusions at discharge.     Patient was seen and plan of care was discussed with attending physician Dr. Collins.    I spent 70 minutes in the care of this patient today, which included time necessary for review of interval events, obtaining history and physical exam, ordering medications/tests/procedures as medically indicated, review of pertinent medical literature, counseling of the patient, coordination of care, and documentation time. Over " "50% of time was spent counseling the patient and/or coordinating care.    Janette Andino PA-C  Hematology/Oncology   Pager: [phone]  Desk phone: [phone]    Interval History   Patient was admitted overnight. Per overnight MD, patient was lethargic on admission due to Ativan given prior to transfer.     When seen this morning, patient is feeling much better. She is fully alert and conversational, no confusion. She has been out of bed to go outside and smoke and was seen ambulating around unit. We discussed recently history, which included 3 days of abdominal cramping, described as intermittently sharp and periumbilical. Had also developed diarrhea that had been becoming increasingly watery. She is now having multiple stools per day. He had recorded temp at home of 102.5, thus presented to ED, where she reports she was \"out of it\" and weak. She denies nausea, bloating. Good PO intake recently. She has been drinking a lot of water. Patient also reported mild cough and congestion. She also has seasonal allergies. Denies any known sick contacts. Today she also reports low back pain that started this morning, consistent with history of sciatic pain. Denies chest pain, SOB    Vital Signs with Ranges  Temp:  [96.5  F (35.8  C)-101.3  F (38.5  C)] 97.5  F (36.4  C)  Pulse:  [] 64  Resp:  [12-37] 20  BP: (119-171)/(63-89) 136/75  SpO2:  [88 %-100 %] 97 %  I/O last 3 completed shifts:  In: 1066.67 [I.V.:566.67; IV Piggyback:200]  Out: 100 [Stool:100]    Physical Exam   General: Sitting up in bed, alert, NAD. Pleasant and conversational.  Skin: No concerning lesions, rash, jaundice, cyanosis, erythema, or ecchymoses on exposed surfaces.   HEENT: NCAT. Anicteric sclera. Moist mucous membranes with erythema or thrush. Closed black lesions on tongue.   Respiratory: Non-labored breathing on room air, good air exchange, lungs clear to auscultation bilaterally.  Cardiovascular: RRR. No murmur or rub.   Gastrointestinal: " Normoactive BS. Abdomen soft, ND, NT. No palpable masses.  Back: No erythema, deformity, or TTP of lumbar spine or paraspinal musculature.   Extremities: No LE edema.   Neurologic: A&O x 3, speech normal, no deficits grossly.    Medications   Current Facility-Administered Medications   Medication Dose Route Frequency Provider Last Rate Last Admin     Current Facility-Administered Medications   Medication Dose Route Frequency Provider Last Rate Last Admin    acyclovir (ZOVIRAX) tablet 400 mg  400 mg Oral BID Melanie Kulkarni MD        ceFEPIme (MAXIPIME) 2 g vial to attach to  mL bag for ADULTS or NS 50 mL bag for PEDS  2 g Intravenous Q8H Melanie Kulkarni MD   2 g at 05/19/25 0024    cyanocobalamin (VITAMIN B-12) tablet 500 mcg  500 mcg Oral Daily Melanie Kulkarni MD        famotidine (PEPCID) tablet 20 mg  20 mg Oral BID Melanie Kulkarni MD        fluticasone (FLONASE) 50 MCG/ACT spray 2 spray  2 spray Both Nostrils BID Melanie Kulkarni MD        fluticasone-vilanterol (BREO ELLIPTA) 200-25 MCG/ACT inhaler 1 puff  1 puff Inhalation Daily Melanie Kulkarni MD        gabapentin (NEURONTIN) capsule 400 mg  400 mg Oral TID Melanie Kulkarni MD        loratadine (CLARITIN) tablet 10 mg  10 mg Oral Daily Melanie Kulkarni MD        metroNIDAZOLE (FLAGYL) infusion 500 mg  500 mg Intravenous Q8H Melanie Kulkarni MD   500 mg at 05/19/25 0119    pantoprazole (PROTONIX) EC tablet 40 mg  40 mg Oral BID AC Melanie Kulkarni MD        PARoxetine (PAXIL) tablet 20 mg  20 mg Oral QAM Melanie Kulkarni MD        posaconazole (NOXAFIL) DR tablet TBEC 300 mg  300 mg Oral QAM Melanie Kulkarni MD        potassium & sodium phosphates (NEUTRA-PHOS) Packet 1 packet  1 packet Oral or Feeding Tube Q4H Alison Collins MD        rosuvastatin (CRESTOR) tablet 20 mg  20 mg Oral Daily Melanie Kulkarni MD        sucralfate (CARAFATE) suspension 1 g  1 g Oral 4x Daily AC & HS Melanie Kulkarni MD        Vitamin D3 (CHOLECALCIFEROL) tablet 25 mcg   25 mcg Oral Daily Melanie Kulkarni MD         Data   Results for orders placed or performed during the hospital encounter of 05/18/25 (from the past 24 hours)   CBC with platelets differential    Narrative    The following orders were created for panel order CBC with platelets differential.  Procedure                               Abnormality         Status                     ---------                               -----------         ------                     CBC with platelets and ...[[id number]]  Abnormal            Final result               RBC and Platelet Morpho...[[id number]]  Abnormal            Final result               Manual Differential[[id number]]         Abnormal            Final result                 Please view results for these tests on the individual orders.   Magnesium   Result Value Ref Range    Magnesium 2.0 1.7 - 2.3 mg/dL   Phosphorus   Result Value Ref Range    Phosphorus 2.9 2.5 - 4.5 mg/dL   Uric acid   Result Value Ref Range    Uric Acid 3.6 2.4 - 5.7 mg/dL   Lactate Dehydrogenase   Result Value Ref Range    Lactate Dehydrogenase 185 0 - 250 U/L   INR   Result Value Ref Range    INR 1.25 (H) 0.85 - 1.15    PT 16.0 (H) 11.8 - 14.8 Seconds   Fibrinogen activity   Result Value Ref Range    Fibrinogen Activity 831 (H) 170 - 510 mg/dL   Partial thromboplastin time   Result Value Ref Range    aPTT 32 22 - 38 Seconds   CBC with platelets and differential   Result Value Ref Range    WBC Count 1.5 (L) 4.0 - 11.0 10e3/uL    RBC Count 2.35 (L) 3.80 - 5.20 10e6/uL    Hemoglobin 6.8 (LL) 11.7 - 15.7 g/dL    Hematocrit 19.9 (L) 35.0 - 47.0 %    MCV 85 78 - 100 fL    MCH 28.9 26.5 - 33.0 pg    MCHC 34.2 31.5 - 36.5 g/dL    RDW 17.0 (H) 10.0 - 15.0 %    Platelet Count 48 (LL) 150 - 450 10e3/uL   RBC and Platelet Morphology   Result Value Ref Range    RBC Morphology Confirmed RBC Indices     Platelet Assessment  Automated Count Confirmed. Platelet morphology is normal.     Automated Count Confirmed.  Platelet morphology is normal.    Toxic Neutrophils Present (A) None Seen   Manual Differential   Result Value Ref Range    % Neutrophils 56 %    % Lymphocytes 23 %    % Monocytes 16 %    % Eosinophils 3 %    % Basophils 0 %    % Metamyelocytes 2 %    Absolute Neutrophils 0.8 (L) 1.6 - 8.3 10e3/uL    Absolute Lymphocytes 0.3 (L) 0.8 - 5.3 10e3/uL    Absolute Monocytes 0.2 0.0 - 1.3 10e3/uL    Absolute Eosinophils 0.0 0.0 - 0.7 10e3/uL    Absolute Basophils 0.0 0.0 - 0.2 10e3/uL    Absolute Metamyelocytes 0.0 <=0.0 10e3/uL   CONDITIONAL Prepare red blood cells (unit)   Result Value Ref Range    Blood Component Type Red Blood Cells     Product Code H5914P50     Unit Status Not used     Unit Number [id number]     CROSSMATCH Compatible     CODING SYSTEM [id number]     UNIT ABO/RH A+     UNIT TYPE ISBT 6200    CONDITIONAL Prepare red blood cells (unit)   Result Value Ref Range    Blood Component Type Red Blood Cells     Product Code Q8295V82     Unit Status Transfused     Unit Number [id number]     CROSSMATCH Compatible     CODING SYSTEM [id number]     ISSUE DATE AND TIME [id number]     UNIT ABO/RH A-     UNIT TYPE ISBT 0600    Respiratory Panel PCR    Specimen: Nasopharyngeal; Swab   Result Value Ref Range    Adenovirus Not Detected Not Detected    Coronavirus Detected (A) Not Detected    Human Metapneumovirus Not Detected Not Detected    Human Rhin/Enterovirus Not Detected Not Detected    Influenza A Not Detected Not Detected    Influenza A, H1 Not Detected Not Detected    Influenza A 2009 H1N1 Not Detected Not Detected    Influenza A, H3 Not Detected Not Detected    Influenza B Not Detected Not Detected    Parainfluenza Virus 1 Not Detected Not Detected    Parainfluenza Virus 2 Not Detected Not Detected    Parainfluenza Virus 3 Not Detected Not Detected    Parainfluenza Virus 4 Not Detected Not Detected    Respiratory Syncytial Virus A Not Detected Not Detected    Respiratory Syncytial Virus B Not Detected Not  Detected    Chlamydia Pneumoniae Not Detected Not Detected    Mycoplasma Pneumoniae Not Detected Not Detected    Narrative    The [employer] Respiratory Panel is a qualitative nucleic acid, multiplex, in vitro diagnostic test for the simultaneous detection and identification of multiple respiratory viral and bacterial nucleic acids in nasopharyngeal swabs collected in viral transport media from individual exhibiting signs and symptoms of respiratory infection. The assay has received FDA approval for the testing of nasopharyngeal (NP) swabs only. This test is used for clinical purposes and should not be regarded as investigational or for research. This laboratory is certified under the Clinical Laboratory Improvement Amendments of 1988 (CLIA-88) as qualified to perform high complexity clinical laboratory testing.   MRSA MSSA PCR, Nasal Swab    Specimen: Nares, Bilateral; Swab   Result Value Ref Range    MRSA Target DNA Negative Negative    SA Target DNA Negative     Narrative    The [employer]  Xpert SA Nasal Complete assay performed in the [employer]  Dx System is a qualitative in vitro diagnostic test designed for rapid detection of Staphylococcus aureus (SA) and methicillin-resistant Staphylococcus aureus (MRSA) from nasal swabs in patients at risk for nasal colonization. The test utilizes automated real-time polymerase chain reaction (PCR) to detect MRSA/SA DNA. The Xpert SA Nasal Complete assay is intended to aid in the prevention and control of MRSA/SA infections in healthcare settings. The assay is not intended to diagnose, guide or monitor treatment for MRSA/SA infections, or provide results of susceptibility to methicillin. A negative result does not preclude MRSA/SA nasal colonization.    Respiratory Aerobic Bacterial Culture with Gram Stain    Specimen: Expectorate; Sputum   Result Value Ref Range    Culture       >10 Squamous epithelial cells/low power field indicates oral contamination. Please recollect.    Gram  Stain Result >10 Squamous epithelial cells/low power field     Gram Stain Result <25 PMNs/low power field     Gram Stain Result 4+ Mixed antonia    Comprehensive metabolic panel   Result Value Ref Range    Sodium 138 135 - 145 mmol/L    Potassium 3.0 (L) 3.4 - 5.3 mmol/L    Carbon Dioxide (CO2) 20 (L) 22 - 29 mmol/L    Anion Gap 11 7 - 15 mmol/L    Urea Nitrogen 13.2 6.0 - 20.0 mg/dL    Creatinine 0.53 0.51 - 0.95 mg/dL    GFR Estimate >90 >60 mL/min/1.73m2    Calcium 9.0 8.8 - 10.4 mg/dL    Chloride 107 98 - 107 mmol/L    Glucose 102 (H) 70 - 99 mg/dL    Alkaline Phosphatase 131 40 - 150 U/L    AST 14 0 - 45 U/L    ALT 14 0 - 50 U/L    Protein Total 6.2 (L) 6.4 - 8.3 g/dL    Albumin 3.3 (L) 3.5 - 5.2 g/dL    Bilirubin Total 1.1 <=1.2 mg/dL   Magnesium   Result Value Ref Range    Magnesium 2.0 1.7 - 2.3 mg/dL   Phosphorus   Result Value Ref Range    Phosphorus 2.4 (L) 2.5 - 4.5 mg/dL   CRP inflammation   Result Value Ref Range    CRP Inflammation 346.00 (H) <5.00 mg/L   CBC with platelets differential    Narrative    The following orders were created for panel order CBC with platelets differential.  Procedure                               Abnormality         Status                     ---------                               -----------         ------                     CBC with platelets and ...[[id number]]  Abnormal            Final result               RBC and Platelet Morpho...[[id number]]  Abnormal            Final result                 Please view results for these tests on the individual orders.   CBC with platelets and differential   Result Value Ref Range    WBC Count 2.2 (L) 4.0 - 11.0 10e3/uL    RBC Count 2.68 (L) 3.80 - 5.20 10e6/uL    Hemoglobin 7.8 (L) 11.7 - 15.7 g/dL    Hematocrit 23.2 (L) 35.0 - 47.0 %    MCV 87 78 - 100 fL    MCH 29.1 26.5 - 33.0 pg    MCHC 33.6 31.5 - 36.5 g/dL    RDW 16.7 (H) 10.0 - 15.0 %    Platelet Count 37 (LL) 150 - 450 10e3/uL    % Neutrophils 58 %    % Lymphocytes 16 %    %  Monocytes 21 %    % Eosinophils 2 %    % Basophils 1 %    % Immature Granulocytes 2 %    NRBCs per 100 WBC 0 <1 /100    Absolute Neutrophils 1.3 (L) 1.6 - 8.3 10e3/uL    Absolute Lymphocytes 0.3 (L) 0.8 - 5.3 10e3/uL    Absolute Monocytes 0.5 0.0 - 1.3 10e3/uL    Absolute Eosinophils 0.1 0.0 - 0.7 10e3/uL    Absolute Basophils 0.0 0.0 - 0.2 10e3/uL    Absolute Immature Granulocytes 0.1 <=0.4 10e3/uL    Absolute NRBCs 0.0 10e3/uL   RBC and Platelet Morphology   Result Value Ref Range    RBC Morphology Confirmed RBC Indices     Platelet Assessment  Automated Count Confirmed. Platelet morphology is normal.     Automated Count Confirmed. Platelet morphology is normal.    Acanthocytes Slight (A) None Seen    Reactive Lymphocytes Present (A) None Seen    Target Cells Slight (A) None Seen    Teardrop Cells Slight (A) None Seen    Toxic Neutrophils Present (A) None Seen

## 2025-05-19 NOTE — H&P
[facility]    History and Physical - Malignant Hematology        Date of Admission:  5/18/2025    Assessment & Plan      Alejandra Villegas is a 59 year old female admitted on 5/18/2025. She has a past medical history significant for AML with NPM1, IDH2, and NRAS mutations undergoing goal 3-4 cycles of consolidation with HiDAC (C2D1 5/6/2025), COPD, migraines, rheumatoid arthritis, aortic stenosis, Afib, and anxiety admitted on 5/18/2025 for neutropenic fever and concern for typhlitis.     #Neutropenic Fever  #Concern for typhlitis   #Non-covid coronavirus  Upon arrival to [facility], T 97.5F, HR 68, /72, RR 20, SpO2 98% on RA. Tmax 102F at home with symptoms of abdominal pain, diarrhea, and fatigue at home. No significant respiratory symptoms. CT AP w/ mural thickening of the duodenum, jejunum, and mildly in the colon, consistent with active enterocolitis.  - Antibiotics   - cefepime 5/18 -    - metronidazole 5/18 -   - Workup  - lactate 1.1  - blood cultures 5/18 x 2 in progress   - lipase 11  -procal 0.52  -  compared to 6.20 on 2/6/25  - flu/covid/RSV negative  - c diff negative  - UA - large blood, RBC 65, WBC 1. Repeat ordered with culture. Pending  - RVP - coronavirus positive (non-covid)   - MRSA nares negative  - sputum culture pending    #Hyponatremia  Na 129 on admit  - NS 100ml/hr overnight  - repeat in AM     # AML with NPM1 mutation  Had been seen by PCP Dec 2024 w/ progressive fatigue and nausea where she was found leukopenic WBC  2.4 and neutropenic w/ ANC 0.3. Hgb 12. Was referred to local hematology/oncology clinic for further evaluation and seen by Dr. Samina Harris. BMBx 2/10/25 done at OSH showed hypercellular marrow w/ trilineage hematopoiesis w/ dyspoiesis with mildly elevated blasts of 4% on morphology. NGS w/ NPM1, IDH2, and NRAS mutations. She was admitted to [facility] 2/26/25 for further workup and management. Slides were re-reviewed by [facility]  Hematopathology, who noted hypercellular marrow with 8% blasts by morphology. Despite relatively low blast percentage, findings were felt most consistent with a diagnosis AML with NPM1 mutation by WHO 2022 (which does not require a specific blast threshold). Following interdepartmental discussions and review of the available literature, patient was started on intensive induction with 7+3 (Day 1=3/5/25). Midcycle BMBx 3/19/25 with no morphologic or immunophenotypic evidence of AML. D28 BMBx completed 3/31/25 with hypercellular marrow (60 to 70%) with no overt dysplasia or increase in blasts, noted flow with 4.3% blasts of unusual phenotype. Due to overall disposition timeline as well as patient living in [facility], preceded directly with consolidation chemotherapy with HiDAC (C1D1=4/3/25) which she tolerated well. C2 HiDAC (C2D1 on 5/6/2025) which she tolerated without any complications. Plan for 3-4 cycles total.   - Baseline cardiopulmonary studies:  - Echo w/ EF 60-65%, mild known aortic stenosis.  - EKG (5/6) with NSR, QTc 487  - CXR (4/27) Small area of new infiltrate versus atelectasis left lung base. No consolidation. R lung clear.  - Baseline viral serologies: CMV IgG+, EBV IgG+, HepB sAg-, HepB cAb-, HepB sAb-, HSV1+/2+, HIV-  - HLA Typing sent on 3/3/2025 and 4/25/2025. BMT Consult on 4/16, no immediate plans for bone marrow transplant.     # Pancytopenia due to chemotherapy  Secondary to underlying hematologic malignancy and exacerbated by recent chemotherapy.   - Transfuse to keep Hgb >7, plt >10K  - irradiated products ordered      # ID prophylaxis  - continue PTA Acyclovir 400 mg BID  - continue PTA Posaconazole 300 mg daily  - Hold Levaquin 250 mg daily     Chronic Problems:  # GERD, Epigastric pain - Continue PTA famotidine, protonix, sucralfate, tums     # COPD - Continue PTA Breo Ellipta inhaler and PRN DuoNebs, Encourage smoking cessation   # Infrarenal abdominal aortic aneurysm - Noted  "incidentally on CT C/A/P (3/19/25), measuring 3.4 cm in diameter. Attention on follow-up imaging   # Chronic migraine w/o aura - Continue APAP PRN, continue PTA sumatriptan, PTA cyclobenzaprine  - Continue PTA sumatriptan and cyclobenzaprine PRN   # BROOKS, MDD - Continue PTA paroxetine  # Degenerative disc disease - Continue gabapentin 400 mg TID  # Vitamin D deficiency - Continue PTA vit D supplement  # Seasonal allergies - Claritin 10 mg daily  #HLD - PTA rosuvastatin   # Rheumatoid arthritis - Patient reported trying treatment though was unable to tolerate well. Managed with Tylenol PRN. Most recent RF >650 (2/10/25). JI negative.       Diet: High Kcal/High Protein Diet, ADULT  DVT Prophylaxis: Pneumatic Compression Devices  Beckett Catheter: Not present  Lines: None     Cardiac Monitoring: None  Code Status: Full Code    Clinically Significant Risk Factors Present on Admission         # Hyponatremia: Lowest Na = 129 mmol/L in last 2 days, will monitor as appropriate  # Hypochloremia: Lowest Cl = 94 mmol/L in last 2 days, will monitor as appropriate        # Thrombocytopenia: Lowest platelets = 4 in last 2 days, will monitor for bleeding   # Hypertension: Noted on problem list      # Anemia: based on hgb <11       # Overweight: Estimated body mass index is 27.06 kg/m  as calculated from the following:    Height as of this encounter: 1.702 m (5' 7\").    Weight as of this encounter: 78.4 kg (172 lb 12.8 oz).       # Financial/Environmental Concerns:    # Asthma: noted on problem list        Disposition Plan     Medically Ready for Discharge: Anticipated in 2-4 Days           Melanie Kulkarni MD  Hospitalist Service  [facility]  Securely message with [employer] (more info)  Text page via [facility] Paging/Directory     ______________________________________________________________________    Chief Complaint   Neutropenic fever    History of Present Illness   Alejandra Villegas is a " 59 year old female admitted on 5/18/2025. She has a past medical history significant for AML with NPM1, IDH2, and NRAS mutations undergoing goal 3-4 cycles of consolidation with HiDAC (C2D1 5/6/2025), COPD, migraines, rheumatoid arthritis, aortic stenosis, Afib, and anxiety admitted on 5/18/2025 for neutropenic fever and concern for typhlitis.      Pt had recent admission 5/6-5/9 for cycle 2 HiDAC which she tolerated without any major issues. She received Neulasta as part of treatment plan on 5/12. She established care with new outpatient hematologist Dr. Griffiths on 5/14 and was doing well at that time. Her local primary oncologist is Dr. Samina Harris at [facility].     Approximately three days ago around 5/15, she began developing abdominal pain, cramping, diarrhea, fevers up to 102F, and lethargy. She presented to the [facility] ED for evaluation. At outside ED, T97F, HR 74, /69, 95% on RA. Labs notable for hgb 6.4 and platelets 4. CTAP obtained and revealed mural edema of the duodenum and jejunum and borderline mural thickening in the colon, compatible with nonspecific acute/active enterocolitis. She received 1u platelets. Infectious workup was ordered and cefepime, flagyl, and vancomycin were started. She additionally received acyclovir and fluconazole. She was accepted for transfer to [facility].       Past Medical History    Past Medical History:   Diagnosis Date    Allergic rhinitis 09/27/2011         Disorder of kidney and ureter 08/08/2008    Kidney disease GFR 87    Dorsalgia     No Comments Provided    Generalized anxiety disorder     No Comments Provided    Hidradenitis suppurativa     No Comments Provided    Other disorders of lung (CODE) 08/01/2007    Pulmonary nodules, stable on follow-up chest CT 12/08.  No further imaging recommended.    Other specified disorders of Eustachian tube, unspecified ear 02/27/2012         Personal history of other medical treatment (CODE)     Childbirth x4     Rheumatoid arthritis (H) 02/27/2012         Tobacco use     No Comments Provided       Past Surgical History   Past Surgical History:   Procedure Laterality Date    ARTHROSCOPY KNEE  05/2017    Dr Torres    ARTHROSCOPY KNEE  07/20/2017    Dr Garza, lateral release, meniscal repair    ARTHROTOMY WRIST Left 2011    Dr. Torres    BONE MARROW BIOPSY, BONE SPECIMEN, NEEDLE/TROCAR Left 02/13/2025    Procedure: Bone marrow biopsy, bone specimen, needle/trocar;  Surgeon: Hema Mari MD;  Location:  OR    CHOLECYSTECTOMY  06/2007    Emergency tracheotomy following failed intubation for laparoscopic cholecystectomy.    DILATION AND CURETTAGE  09/2006         HERNIA REPAIR  2007    Incisoinal hernia repair    HYSTERECTOMY TOTAL ABDOMINAL  02/2010         IMPLANT STIMULATOR AND LEADS SACRAL NERVE (STAGE ONE AND TWO) N/A 12/11/2018    Procedure: Interstim Battery Replacement;  Surgeon: Rl Ambriz MD;  Location:  OR    INTERSTIM DEVICE STAGE 2  01/06/2014    Dr. Ambriz [facility]    LAPAROSCOPIC ABLATION ENDOMETRIOSIS  09/2006         OOPHORECTOMY Left 2010     Dr Thorpe    PICC INSERTION - DOUBLE LUMEN Right 03/05/2025    5FR, DL, total cath length=42 CM, visible cath length= 3CM, brachial medial vein    PICC INSERTION - DOUBLE LUMEN Right 05/06/2025    44-3Ccm, Basilic vein    RECONSTRUCT FOREFOOT WITH METATARSOPHALANGEAL (MTP) FUSION Right 05/05/2022    Procedure: Right fibular sesmoid excision and 1st metatarsal phalangeal joint fusion;  Surgeon: Rl Nathan DPM;  Location:  OR    RELEASE CARPAL TUNNEL  02/02/2017         RELEASE PLANTAR FASCIA Left 12/19/2024    Procedure: excision of soft tissue mass left foot,  gastroc recession;  Surgeon: Rl Nathan DPM;  Location:  OR    REMOVE HARDWARE FOOT Right 08/10/2023    Procedure: REMOVAL, HARDWARE, FOOT;  Surgeon: Rl Nathan DPM;  Location:  OR    SALPINGO OOPHORECTOMY,R/L/KELSEY Right 06/1999    Right salpingo-oophorectomy for hemorrhagic corpus  luteum cyst    TRACHEOSTOMY  2007    emergency following failed intubation during cholecystectomy    TYMPANOSTOMY, LOCAL/TOPICAL ANESTHESIA  08/2006    PE tube placement       Prior to Admission Medications   Prior to Admission Medications   Prescriptions Last Dose Informant Patient Reported? Taking?   PARoxetine (PAXIL) 20 MG tablet   No No   Sig: Take 1 tablet (20 mg) by mouth every morning.   SUMAtriptan (IMITREX) 50 MG tablet   No No   Sig: Take 1 tablet (50 mg) by mouth at onset of headache for migraine.   Vitamin D3 (CHOLECALCIFEROL) 25 mcg (1000 units) tablet   No No   Sig: Take 1 tablet (25 mcg) by mouth daily.   acetaminophen (TYLENOL) 325 MG tablet   No No   Sig: Take 2 tablets (650 mg) by mouth every 4 hours as needed for mild pain.   acyclovir (ZOVIRAX) 400 MG tablet   No No   Sig: Take 1 tablet (400 mg) by mouth 2 times daily.   albuterol (PROAIR HFA/PROVENTIL HFA/VENTOLIN HFA) 108 (90 Base) MCG/ACT inhaler   No No   Sig: Inhale 1-2 puffs into the lungs 4 times daily as needed (Refractory bronchospasm associated with hypersensitivity reaction).   albuterol (PROVENTIL) (2.5 MG/3ML) 0.083% neb solution   No No   Sig: Take 1 vial (2.5 mg) by nebulization 4 times daily as needed (Refractory bronchospasm associated with hypersensitivity reaction).   artificial saliva (BIOTENE MT) AERS spray   No No   Sig: Take 1 spray by mouth every 6 hours as needed for dry mouth.   budesonide-formoterol (SYMBICORT) 80-4.5 MCG/ACT Inhaler   No No   Sig: Inhale 2 puffs into the lungs 2 times daily - Rinse mouth well after use to prevent Thrush   calcium carbonate (TUMS) 500 MG chewable tablet   No No   Sig: Take 1 tablet (500 mg) by mouth 3 times daily as needed for heartburn.   cyanocobalamin (VITAMIN B-12) 500 MCG tablet   Yes No   Sig: Take 500 mcg by mouth daily.   cyclobenzaprine (FLEXERIL) 10 MG tablet   No No   Sig: Take 1 tablet (10 mg) by mouth 3 times daily as needed for muscle spasms.   famotidine (PEPCID) 20 MG  tablet   No No   Sig: Take 1 tablet (20 mg) by mouth 2 times daily.   fluticasone (FLONASE) 50 MCG/ACT nasal spray   No No   Sig: Spray 2 sprays into both nostrils 2 times daily.   gabapentin (NEURONTIN) 400 MG capsule   No No   Sig: Take 1 capsule (400 mg) by mouth 3 times daily.   guaiFENesin (MUCINEX) 600 MG 12 hr tablet   No No   Sig: Take 2 tablets (1,200 mg) by mouth 2 times daily.   hydrOXYzine HCl (ATARAX) 25 MG tablet   No No   Sig: Take 1-2 tablets (25-50 mg) by mouth every 6 hours as needed for anxiety or itching (adjuvant pain, sleep).   ipratropium - albuterol 0.5 mg/2.5 mg/3 mL (DUONEB) 0.5-2.5 (3) MG/3ML neb solution   No No   Sig: Take 1 vial (3 mLs) by nebulization every 4 hours as needed for shortness of breath, wheezing or cough.   levofloxacin (LEVAQUIN) 250 MG tablet   No No   Sig: Take 1 tablet (250 mg) by mouth daily. Start on hospital discharge; continue through soo (low point in blood counts) until ANC >1.0, or as otherwise instructed by your oncology team.   loratadine (CLARITIN) 10 MG tablet   No No   Sig: Take 1 tablet (10 mg) by mouth daily.   ondansetron (ZOFRAN ODT) 4 MG ODT tab   No No   Sig: Take 1-2 tablets (4-8 mg) by mouth every 8 hours as needed for nausea or vomiting.   order for DME   No No   Sig: Equipment being ordered: home neb set up with mask and tubing   pantoprazole (PROTONIX) 40 MG EC tablet   No No   Sig: Take 1 tablet (40 mg) by mouth 2 times daily (before meals).   posaconazole (NOXAFIL) 100 MG DR tablet   No No   Sig: Take 3 tablets (300 mg) by mouth every morning.   prednisoLONE acetate (PRED FORTE) 1 % ophthalmic suspension   No No   Sig: Place 2 drops into both eyes 4 times daily.   prochlorperazine (COMPAZINE) 5 MG tablet   No No   Sig: Take 1 tablet (5 mg) by mouth every 6 hours as needed for nausea or vomiting.   rosuvastatin (CRESTOR) 20 MG tablet   No No   Sig: Take 1 tablet (20 mg) by mouth daily.   sucralfate (CARAFATE) 1 GM/10ML suspension   No No    Sig: Take 10 mLs (1 g) by mouth 4 times daily (before meals and nightly).      Facility-Administered Medications: None           Physical Exam   Vital Signs: Temp: 97.5  F (36.4  C) Temp src: Oral BP: 126/72 Pulse: 68   Resp: 20 SpO2: 98 % O2 Device: None (Room air)    Weight: 172 lbs 12.8 oz    General: non-toxic appearing, no acute distress. Chronically ill appearing   HEENT: normocephalic, atraumatic with moist mucous membranes  Respiratory: comfortable breathing on room air, no increased work of breathing, no accessory muscle use. Clear to auscultation in all lobes bilaterally.   Cardiac: Regular rate and rhythm.   Abdomen: Non-distended. Soft, nontender to palpation   Extremities: No significant lower extremity edema      Medical Decision Making       90 MINUTES SPENT BY ME on the date of service doing chart review, history, exam, documentation & further activities per the note.      Data     I have personally reviewed the following data over the past 24 hrs:    1.5 (L)  \   6.8 (LL)   / 48 (LL)     129 (L) 94 (L) 13.1 /  128 (H)   3.5 22 0.65 \     ALT: 16 AST: 14 AP: 146 TBILI: 1.4 (H)   ALB: 3.7 TOT PROTEIN: 7.2 LIPASE: 11 (L)     Procal: 0.52 (H) CRP: 346.45 (H) Lactic Acid: 1.1       INR:  1.25 (H) PTT:  32   D-dimer:  N/A Fibrinogen:  831 (H)     Ferritin:  N/A % Retic:  N/A LDH:  185       Imaging results reviewed over the past 24 hrs:   Recent Results (from the past 24 hours)   CT Abdomen Pelvis w Contrast    Narrative    EXAM: CT ABDOMEN PELVIS W CONTRAST  LOCATION: [facility]  DATE: 5/18/2025    INDICATION: pancytopenic AML pt, abd pain, diarrhea, fever  COMPARISON: CTs CAP 4/14/2025 and 3/19/2025  TECHNIQUE: CT scan of the abdomen and pelvis was performed following injection of IV contrast. Multiplanar reformats were obtained. Dose reduction techniques were used.  CONTRAST: 103mL Isovue 370    FINDINGS:   LOWER CHEST: Faint hazy groundglass density opacity, interlobular septal  thickening, bronchial wall thickening and thin bands of linear atelectasis throughout the visualized lungs. No pleural effusion. Heart size normal with no pericardial effusion.   Aortic valve leaflet and three-vessel coronary artery calcification.    HEPATOBILIARY: Diffuse hepatic steatosis and hepatomegaly (craniocaudal dimension right hepatic lobe 22 cm) Liver is otherwise normal. No bile duct dilatation. Cholecystectomy.    PANCREAS: Normal.    SPLEEN: Spleen size normal at 11 cm unchanged.    ADRENAL GLANDS: Normal.    KIDNEYS/BLADDER: Kidneys, ureters and bladder are normal.    BOWEL: Mural edema affecting the duodenum and jejunum with some edema and mild lymph node enlargement in the subtending mesenteric and liquid stool throughout the colon where there is also borderline mural thickening. Findings would be compatible with a   nonspecific acute/active enterocolitis in the proper clinical setting. Normal appendix. No bowel obstruction. No free air. No free fluid.    LYMPH NODES: No lymphadenopathy.    VASCULATURE: Infrarenal abdominal aortic aneurysm 3.6 x 3.2 cm diameter is unchanged. Calcified atheromatous plaque at the origin of the mesenteric and renal arteries.    PELVIC ORGANS: Hysterectomy. Pelvis otherwise unremarkable.    MUSCULOSKELETAL: No bone lesion or fracture. Bones appear demineralized. Degenerative disc disease lumbosacral interspace. Generator device left gluteal region with left transsacral electrode.      Impression    IMPRESSION:   1.  Mural edema affecting the duodenum and jejunum with some edema and mild lymph node enlargement in the subtending mesenteric and liquid stool throughout the colon where there is also borderline mural thickening. Findings would be compatible with a   nonspecific acute/active enterocolitis in the proper clinical setting.  2.  Diffuse hepatic steatosis and hepatomegaly.  3.  Infrarenal abdominal aortic aneurysm 3.6 x 3.2 cm diameter is unchanged.  4.  Faint hazy  groundglass density opacity, interlobular septal thickening, bronchial wall thickening and thin bands of linear atelectasis throughout the visualized lungs.    Aorta recommendation: Recommend followup by ultrasound in 2 years.     XR Chest 2 Views    Narrative    EXAM: XR CHEST 2 VIEWS  LOCATION: [facility]  DATE: 5/18/2025    INDICATION: Fever of unknown origin.  COMPARISON: 4/27/2025.      Impression    IMPRESSION: Mild opacity at the left lung base is similar to the previous exam, and could be related to atelectasis or infection. The lungs are otherwise clear. Aortic calcification. Heart size and pulmonary vascularity are within normal limits. No   pleural effusions.

## 2025-05-19 NOTE — ED NOTES
Call received From [facility] placement. Patient will be going to the [place] [facility] unit 5[address]  Nurse to nurse is [phone]

## 2025-05-19 NOTE — ED NOTES
[facility] contacted, they continue to state no ground transportation available, d/t pt condition, receiving facility and sending MD would like pt transferred tonight.  Brad informed of this, request made to inquire if flight available.

## 2025-05-19 NOTE — CONSULTS
Care Management Initial Consult    General Information  Assessment completed with: Patient,    Type of CM/SW Visit: Initial Assessment    Primary Care Provider verified and updated as needed: Yes   Readmission within the last 30 days: no previous admission in last 30 days      Reason for Consult: discharge planning  Advance Care Planning: Advance Care Planning Reviewed: questions answered (Copy Requested)          Communication Assessment  Patient's communication style: spoken language (English or Bilingual)             Cognitive  Cognitive/Neuro/Behavioral: WDL                      Living Environment:   People in home: child(lana), adult, grandchild(lana)     Current living Arrangements: mobile home      Able to return to prior arrangements: yes       Family/Social Support:  Care provided by: self, child(lana)  Provides care for: no one  Marital Status: Single  Support system: Sibling(s), Children          Description of Support System: Supportive, Involved    Support Assessment: Adequate family and caregiver support    Current Resources:   Patient receiving home care services: No        Community Resources: None  Equipment currently used at home: none  Supplies currently used at home: None    Employment/Financial:  Employment Status: disabled        Financial Concerns: none   Referral to Financial Worker: No       Does the patient's insurance plan have a 3 day qualifying hospital stay waiver?  No    Lifestyle & Psychosocial Needs:  Social Drivers of Health     Food Insecurity: Low Risk  (5/6/2025)    Food Insecurity     Within the past 12 months, did you worry that your food would run out before you got money to buy more?: No     Within the past 12 months, did the food you bought just not last and you didn t have money to get more?: No   Depression: Not at risk (4/11/2025)    PHQ-2     PHQ-2 Score: 0   Housing Stability: Low Risk  (5/6/2025)    Housing Stability     Do you have housing? : Yes     Are you worried  about losing your housing?: No   Tobacco Use: High Risk (4/28/2025)    Patient History     Smoking Tobacco Use: Every Day     Smokeless Tobacco Use: Never     Passive Exposure: Past   Financial Resource Strain: Low Risk  (5/6/2025)    Financial Resource Strain     Within the past 12 months, have you or your family members you live with been unable to get utilities (heat, electricity) when it was really needed?: No   Alcohol Use: Not on file   Transportation Needs: Low Risk  (5/6/2025)    Transportation Needs     Within the past 12 months, has lack of transportation kept you from medical appointments, getting your medicines, non-medical meetings or appointments, work, or from getting things that you need?: No   Physical Activity: Not on file   Interpersonal Safety: Low Risk  (5/6/2025)    Interpersonal Safety     Do you feel physically and emotionally safe where you currently live?: Yes     Within the past 12 months, have you been hit, slapped, kicked or otherwise physically hurt by someone?: No     Within the past 12 months, have you been humiliated or emotionally abused in other ways by your partner or ex-partner?: No   Recent Concern: Interpersonal Safety - High Risk (2/13/2025)    Interpersonal Safety     Do you feel physically and emotionally safe where you currently live?: No     Within the past 12 months, have you been hit, slapped, kicked or otherwise physically hurt by someone?: No     Within the past 12 months, have you been humiliated or emotionally abused in other ways by your partner or ex-partner?: No   Stress: Not on file   Social Connections: Not on file   Health Literacy: Not on file       Functional Status:  Prior to admission patient needed assistance:   Dependent ADLs:: Independent  Dependent IADLs:: Independent       Mental Health Status:  Mental Health Status: No Current Concerns       Chemical Dependency Status:  Chemical Dependency Status: No Current Concerns              Values/Beliefs:  Spiritual, Cultural Beliefs, Zoroastrianism Practices, Values that affect care: no               Discussed  Partnership in Safe Discharge Planning  document with patient/family: No    Additional Information:  59 year old female with a past medical history significant for COPD, migraines, rheumatoid arthritis, aortic stenosis, afib, anxiety, and AML with NPM1, IDH2, and NRAS mutations, most recently s/p HiDAC consolidation (C2D1=5/6/2025). She presented to [facility] ED on 5/18 with neutropenic fever, diarrhea, and abdominal pain and was found to have colitis on CT and RVP positive for coronavirus.     RNCC met with Pt at the bedside to complete assessment and review possible discharge needs.    Pt recently discharged with OP infusion. She reports this was going well and she denies any questions or concerns.     [facility] (labs & Neulasta injection)  Phone:  [phone] (ask for the Infusion Center)  Phone:  [phone], Infusion Center Nurse (Steffany)  FAX: [phone]    Pt is anticipated to discharge tomorrow on PO Abx. She would like a cane at DC. PT consulted for eval and will issue cane at DC. DME order placed    Pt reports her sister will drive her home.    Per rounds and MD no further discharge needs. Care management will sign off, please re consult for new discharge needs.      Next Steps: Sign Off     MAC Schafer  Care Management Department  Covering 5A: 1026-3734 & 5C: 1118-4014 (non-BMT)   Phone: [phone]  Teams  Vocera: weekdays 8:00 am - 4:30 pm.   See Vocera Care Team for off-day coverage

## 2025-05-19 NOTE — PLAN OF CARE
"Goal Outcome Evaluation:      Plan of Care Reviewed With: patient    Overall Patient Progress: no changeOverall Patient Progress: no change     Time    /63 (BP Location: Right arm)   Pulse 68   Temp 98.2  F (36.8  C) (Oral)   Resp 22   Ht 1.702 m (5' 7\")   Wt 78.4 kg (172 lb 12.8 oz)   LMP 01/01/2007 (Approximate)   SpO2 96%   BMI 27.06 kg/m      Reason for admission: Neutropenic fever  Activity: SBA  Pain: Pt denies any pain nausea and vomiting  Neuro: A&O X4,   Cardiac: WNL  Respiratory: WNL  GI/: Voiding urine spontaneously and passing gas, pt is having loose stools  Diet: Reg diet  Lines: PIV X2        New changes this shift: Pt arrived from [facility] ED,Provider notified she received 1 unit of PRBC, iv antibiotics started     Continue to monitor and follow POC            "

## 2025-05-19 NOTE — ED NOTES
Writer called patient's sister, Víctor, updated that pt will be flying to [place]. Sister stated she will attempt to make it here prior to pt leaving.

## 2025-05-19 NOTE — PHARMACY-ADMISSION MEDICATION HISTORY
Pharmacist Admission Medication History    Admission medication history is complete. The information provided in this note is only as accurate as the sources available at the time of the update.    Information Source(s): Patient, Hospital records, and CareEverywhere/SureScripts via phone    Changes made to PTA medication list:  Added: Potassium chloride ER 20 mEq PO daily  Deleted: -Artifical saliva, 1 spray into mouth every 6 hours as needed for dry mouth  -Ipratropium-albuterol (Duoneb) 0.5-2.5 mg/3 mL every 4 hours as needed for shortness of breath/wheezing  Changed: None    Allergies reviewed with patient and updates made in EHR: yes    Medication History Completed By: Armando Cox PharmD 5/19/2025 4:46 PM    PTA Med List   Medication Sig Last Dose/Taking    acetaminophen (TYLENOL) 325 MG tablet Take 2 tablets (650 mg) by mouth every 4 hours as needed for mild pain. 5/18/2025 Evening    acyclovir (ZOVIRAX) 400 MG tablet Take 1 tablet (400 mg) by mouth 2 times daily. 5/18/2025 Morning    albuterol (PROAIR HFA/PROVENTIL HFA/VENTOLIN HFA) 108 (90 Base) MCG/ACT inhaler Inhale 1-2 puffs into the lungs 4 times daily as needed (Refractory bronchospasm associated with hypersensitivity reaction). Past Week    albuterol (PROVENTIL) (2.5 MG/3ML) 0.083% neb solution Take 1 vial (2.5 mg) by nebulization 4 times daily as needed (Refractory bronchospasm associated with hypersensitivity reaction). More than a month    budesonide-formoterol (SYMBICORT) 80-4.5 MCG/ACT Inhaler Inhale 2 puffs into the lungs 2 times daily - Rinse mouth well after use to prevent Thrush 5/18/2025 Morning    calcium carbonate (TUMS) 500 MG chewable tablet Take 1 tablet (500 mg) by mouth 3 times daily as needed for heartburn. Past Week    cyanocobalamin (VITAMIN B-12) 500 MCG tablet Take 500 mcg by mouth daily. 5/18/2025 Morning    cyclobenzaprine (FLEXERIL) 10 MG tablet Take 1 tablet (10 mg) by mouth 3 times daily as needed for muscle spasms. Past  Week    famotidine (PEPCID) 20 MG tablet Take 1 tablet (20 mg) by mouth 2 times daily. 5/18/2025 Morning    fluticasone (FLONASE) 50 MCG/ACT nasal spray Spray 2 sprays into both nostrils 2 times daily. 5/18/2025 Morning    gabapentin (NEURONTIN) 400 MG capsule Take 1 capsule (400 mg) by mouth 3 times daily. 5/18/2025 Morning    guaiFENesin (MUCINEX) 600 MG 12 hr tablet Take 2 tablets (1,200 mg) by mouth 2 times daily. 5/18/2025 Morning    hydrOXYzine HCl (ATARAX) 25 MG tablet Take 1-2 tablets (25-50 mg) by mouth every 6 hours as needed for anxiety or itching (adjuvant pain, sleep). Past Week    levofloxacin (LEVAQUIN) 250 MG tablet Take 1 tablet (250 mg) by mouth daily. Start on hospital discharge; continue through soo (low point in blood counts) until ANC >1.0, or as otherwise instructed by your oncology team. 5/18/2025 Morning    loratadine (CLARITIN) 10 MG tablet Take 1 tablet (10 mg) by mouth daily. 5/18/2025 Morning    ondansetron (ZOFRAN ODT) 4 MG ODT tab Take 1-2 tablets (4-8 mg) by mouth every 8 hours as needed for nausea or vomiting. Past Month    pantoprazole (PROTONIX) 40 MG EC tablet Take 1 tablet (40 mg) by mouth 2 times daily (before meals). 5/18/2025 Morning    PARoxetine (PAXIL) 20 MG tablet Take 1 tablet (20 mg) by mouth every morning. 5/18/2025 Morning    posaconazole (NOXAFIL) 100 MG DR tablet Take 3 tablets (300 mg) by mouth every morning. 5/18/2025 Morning    potassium chloride ER (K-TAB) 20 MEQ CR tablet Take 20 mEq by mouth daily. 5/18/2025 Morning    prednisoLONE acetate (PRED FORTE) 1 % ophthalmic suspension Place 2 drops into both eyes 4 times daily. Past Month    prochlorperazine (COMPAZINE) 5 MG tablet Take 1 tablet (5 mg) by mouth every 6 hours as needed for nausea or vomiting. Past Week    rosuvastatin (CRESTOR) 20 MG tablet Take 1 tablet (20 mg) by mouth daily. 5/18/2025 Morning    sucralfate (CARAFATE) 1 GM/10ML suspension Take 10 mLs (1 g) by mouth 4 times daily (before meals and  nightly). 5/18/2025 Morning    SUMAtriptan (IMITREX) 50 MG tablet Take 1 tablet (50 mg) by mouth at onset of headache for migraine. More than a month    Vitamin D3 (CHOLECALCIFEROL) 25 mcg (1000 units) tablet Take 1 tablet (25 mcg) by mouth daily. 5/18/2025 Morning

## 2025-05-20 ENCOUNTER — PATIENT OUTREACH (OUTPATIENT)
Dept: ONCOLOGY | Facility: OTHER | Age: 60
End: 2025-05-20
Payer: MEDICARE

## 2025-05-20 VITALS
SYSTOLIC BLOOD PRESSURE: 135 MMHG | TEMPERATURE: 98.1 F | HEIGHT: 67 IN | WEIGHT: 174.4 LBS | RESPIRATION RATE: 18 BRPM | HEART RATE: 84 BPM | BODY MASS INDEX: 27.37 KG/M2 | DIASTOLIC BLOOD PRESSURE: 77 MMHG | OXYGEN SATURATION: 95 %

## 2025-05-20 LAB
ALBUMIN SERPL BCG-MCNC: 3.3 G/DL (ref 3.5–5.2)
ALP SERPL-CCNC: 144 U/L (ref 40–150)
ALT SERPL W P-5'-P-CCNC: 12 U/L (ref 0–50)
ANION GAP SERPL CALCULATED.3IONS-SCNC: 13 MMOL/L (ref 7–15)
AST SERPL W P-5'-P-CCNC: 20 U/L (ref 0–45)
BACTERIA UR CULT: NORMAL
BASOPHILS # BLD MANUAL: 0 10E3/UL (ref 0–0.2)
BASOPHILS NFR BLD MANUAL: 0 %
BILIRUB SERPL-MCNC: 0.3 MG/DL
BUN SERPL-MCNC: 11.7 MG/DL (ref 6–20)
CALCIUM SERPL-MCNC: 9.1 MG/DL (ref 8.8–10.4)
CHLORIDE SERPL-SCNC: 108 MMOL/L (ref 98–107)
CREAT SERPL-MCNC: 0.62 MG/DL (ref 0.51–0.95)
EGFRCR SERPLBLD CKD-EPI 2021: >90 ML/MIN/1.73M2
ELLIPTOCYTES BLD QL SMEAR: SLIGHT
EOSINOPHIL # BLD MANUAL: 0.2 10E3/UL (ref 0–0.7)
EOSINOPHIL NFR BLD MANUAL: 2 %
ERYTHROCYTE [DISTWIDTH] IN BLOOD BY AUTOMATED COUNT: 17.9 % (ref 10–15)
GLUCOSE SERPL-MCNC: 118 MG/DL (ref 70–99)
HCO3 SERPL-SCNC: 19 MMOL/L (ref 22–29)
HCT VFR BLD AUTO: 21.8 % (ref 35–47)
HGB BLD-MCNC: 7.2 G/DL (ref 11.7–15.7)
LYMPHOCYTES # BLD MANUAL: 1.3 10E3/UL (ref 0.8–5.3)
LYMPHOCYTES NFR BLD MANUAL: 11 %
MAGNESIUM SERPL-MCNC: 1.9 MG/DL (ref 1.7–2.3)
MCH RBC QN AUTO: 29.3 PG (ref 26.5–33)
MCHC RBC AUTO-ENTMCNC: 33 G/DL (ref 31.5–36.5)
MCV RBC AUTO: 89 FL (ref 78–100)
METAMYELOCYTES # BLD MANUAL: 0.2 10E3/UL
METAMYELOCYTES NFR BLD MANUAL: 2 %
MONOCYTES # BLD MANUAL: 1.3 10E3/UL (ref 0–1.3)
MONOCYTES NFR BLD MANUAL: 12 %
NEUTROPHILS # BLD MANUAL: 8.2 10E3/UL (ref 1.6–8.3)
NEUTROPHILS NFR BLD MANUAL: 73 %
PHOSPHATE SERPL-MCNC: 2.5 MG/DL (ref 2.5–4.5)
PLAT MORPH BLD: ABNORMAL
PLATELET # BLD AUTO: 30 10E3/UL (ref 150–450)
POTASSIUM SERPL-SCNC: 3.3 MMOL/L (ref 3.4–5.3)
POTASSIUM SERPL-SCNC: 3.6 MMOL/L (ref 3.4–5.3)
PROT SERPL-MCNC: 6.3 G/DL (ref 6.4–8.3)
RBC # BLD AUTO: 2.46 10E6/UL (ref 3.8–5.2)
RBC MORPH BLD: ABNORMAL
SODIUM SERPL-SCNC: 140 MMOL/L (ref 135–145)
WBC # BLD AUTO: 11.3 10E3/UL (ref 4–11)

## 2025-05-20 PROCEDURE — 84132 ASSAY OF SERUM POTASSIUM: CPT

## 2025-05-20 PROCEDURE — 83735 ASSAY OF MAGNESIUM: CPT

## 2025-05-20 PROCEDURE — 999N000147 HC STATISTIC PT IP EVAL DEFER

## 2025-05-20 PROCEDURE — 250N000013 HC RX MED GY IP 250 OP 250 PS 637: Performed by: PHYSICIAN ASSISTANT

## 2025-05-20 PROCEDURE — 84132 ASSAY OF SERUM POTASSIUM: CPT | Performed by: STUDENT IN AN ORGANIZED HEALTH CARE EDUCATION/TRAINING PROGRAM

## 2025-05-20 PROCEDURE — 250N000013 HC RX MED GY IP 250 OP 250 PS 637

## 2025-05-20 PROCEDURE — 36415 COLL VENOUS BLD VENIPUNCTURE: CPT

## 2025-05-20 PROCEDURE — 85018 HEMOGLOBIN: CPT

## 2025-05-20 PROCEDURE — 85007 BL SMEAR W/DIFF WBC COUNT: CPT

## 2025-05-20 PROCEDURE — 36415 COLL VENOUS BLD VENIPUNCTURE: CPT | Performed by: STUDENT IN AN ORGANIZED HEALTH CARE EDUCATION/TRAINING PROGRAM

## 2025-05-20 PROCEDURE — 250N000011 HC RX IP 250 OP 636: Mod: JZ

## 2025-05-20 PROCEDURE — 250N000013 HC RX MED GY IP 250 OP 250 PS 637: Performed by: STUDENT IN AN ORGANIZED HEALTH CARE EDUCATION/TRAINING PROGRAM

## 2025-05-20 PROCEDURE — 84100 ASSAY OF PHOSPHORUS: CPT

## 2025-05-20 RX ORDER — METRONIDAZOLE 500 MG/1
500 TABLET ORAL 3 TIMES DAILY
Status: DISCONTINUED | OUTPATIENT
Start: 2025-05-20 | End: 2025-05-20 | Stop reason: HOSPADM

## 2025-05-20 RX ORDER — POTASSIUM CHLORIDE 750 MG/1
40 TABLET, EXTENDED RELEASE ORAL ONCE
Status: COMPLETED | OUTPATIENT
Start: 2025-05-20 | End: 2025-05-20

## 2025-05-20 RX ORDER — METRONIDAZOLE 500 MG/1
500 TABLET ORAL 3 TIMES DAILY
Qty: 42 TABLET | Refills: 0 | Status: SHIPPED | OUTPATIENT
Start: 2025-05-20 | End: 2025-06-03

## 2025-05-20 RX ORDER — LEVOFLOXACIN 250 MG/1
750 TABLET, FILM COATED ORAL DAILY
Status: DISCONTINUED | OUTPATIENT
Start: 2025-05-20 | End: 2025-05-20 | Stop reason: HOSPADM

## 2025-05-20 RX ORDER — LEVOFLOXACIN 750 MG/1
750 TABLET, FILM COATED ORAL DAILY
Qty: 13 TABLET | Refills: 0 | Status: SHIPPED | OUTPATIENT
Start: 2025-05-20

## 2025-05-20 RX ORDER — LOPERAMIDE HYDROCHLORIDE 2 MG/1
4 CAPSULE ORAL 3 TIMES DAILY PRN
Qty: 10 CAPSULE | Refills: 0 | Status: SHIPPED | OUTPATIENT
Start: 2025-05-20

## 2025-05-20 RX ORDER — GUAIFENESIN/DEXTROMETHORPHAN 100-10MG/5
5 SYRUP ORAL EVERY 4 HOURS PRN
Qty: 118 ML | Refills: 0 | Status: SHIPPED | OUTPATIENT
Start: 2025-05-20

## 2025-05-20 RX ADMIN — PAROXETINE HYDROCHLORIDE 20 MG: 20 TABLET, FILM COATED ORAL at 10:00

## 2025-05-20 RX ADMIN — METRONIDAZOLE 500 MG: 500 INJECTION, SOLUTION INTRAVENOUS at 01:05

## 2025-05-20 RX ADMIN — PANTOPRAZOLE SODIUM 40 MG: 40 TABLET, DELAYED RELEASE ORAL at 10:02

## 2025-05-20 RX ADMIN — ACETAMINOPHEN 650 MG: 325 TABLET ORAL at 05:45

## 2025-05-20 RX ADMIN — FAMOTIDINE 20 MG: 20 TABLET, FILM COATED ORAL at 10:00

## 2025-05-20 RX ADMIN — POSACONAZOLE 300 MG: 100 TABLET, DELAYED RELEASE ORAL at 10:09

## 2025-05-20 RX ADMIN — ACYCLOVIR 400 MG: 400 TABLET ORAL at 10:00

## 2025-05-20 RX ADMIN — ROSUVASTATIN CALCIUM 20 MG: 10 TABLET, FILM COATED ORAL at 10:01

## 2025-05-20 RX ADMIN — CYANOCOBALAMIN TAB 500 MCG 500 MCG: 500 TAB at 10:01

## 2025-05-20 RX ADMIN — FLUTICASONE PROPIONATE 2 SPRAY: 50 SPRAY, METERED NASAL at 10:02

## 2025-05-20 RX ADMIN — POTASSIUM CHLORIDE 40 MEQ: 750 TABLET, EXTENDED RELEASE ORAL at 10:00

## 2025-05-20 RX ADMIN — SUCRALFATE 1 G: 1 SUSPENSION ORAL at 10:13

## 2025-05-20 RX ADMIN — LORATADINE 10 MG: 10 TABLET ORAL at 10:00

## 2025-05-20 RX ADMIN — GABAPENTIN 400 MG: 300 CAPSULE ORAL at 10:01

## 2025-05-20 RX ADMIN — LEVOFLOXACIN 750 MG: 250 TABLET, FILM COATED ORAL at 10:00

## 2025-05-20 RX ADMIN — Medication 25 MCG: at 10:00

## 2025-05-20 RX ADMIN — FLUTICASONE FUROATE AND VILANTEROL TRIFENATATE 1 PUFF: 200; 25 POWDER RESPIRATORY (INHALATION) at 10:02

## 2025-05-20 RX ADMIN — METRONIDAZOLE 500 MG: 500 TABLET ORAL at 10:08

## 2025-05-20 RX ADMIN — CEFEPIME 2 G: 2 INJECTION, POWDER, FOR SOLUTION INTRAVENOUS at 00:21

## 2025-05-20 ASSESSMENT — ACTIVITIES OF DAILY LIVING (ADL)
ADLS_ACUITY_SCORE: 43
ADLS_ACUITY_SCORE: 43
ADLS_ACUITY_SCORE: 45
ADLS_ACUITY_SCORE: 43
ADLS_ACUITY_SCORE: 45
ADLS_ACUITY_SCORE: 43

## 2025-05-20 NOTE — PLAN OF CARE
Goal Outcome Evaluation:    Plan of Care Reviewed With: patient    Overall Patient Progress: improving    Outcome Evaluation: Possible discharge 5/20, vitals and pain monitored overnight.    [id number]    A/Ox4. VSS on RA, afebrile. UAL. Voiding adequately, last BM 5/19 - loose, but improving. Urine is dark yellow in color. Pain up to 9 in lower back, managed w PRN Tylenol given x1 w relief. Denies nausea or SOB. Tolerating regular diet. Droplet precautions maintained.    K replaced yesterday during the day; recheck showed K = 3.2 so potassium replacement protocol ran again. PO K administered w redraw due @ ~ 0245. Recheck = 3.6.

## 2025-05-20 NOTE — DISCHARGE SUMMARY
[facility]    Discharge Summary  Hematology / Oncology    Date of Admission: 5/18/2025  Date of Discharge: 5/20/2025  Discharging Provider: Janette Michelle PA-C  Date of Service (when I saw the patient): 05/20/25    Discharge Diagnoses  Neutropenic fever  Neutropenic enterocolitis  Coronavirus (not COVID-19)  AML  Anemia  Thrombocytopenia  GERD  COPD  Tobacco use disorder  HTN  AAA  CKD  Chronic headaches/migraines  BROOKS  MDD  DDD  Vit D deficiency  Seasonal allergies  RA  Prediabetes  HLD    History of Present Illness  Alejandra Villegas is a 59 year old female with a past medical history significant for COPD, migraines, rheumatoid arthritis, aortic stenosis, afib, anxiety, and AML with NPM1, IDH2, and NRAS mutations, most recently s/p HiDAC consolidation (C2D1=5/6/2025). She presented to [facility] ED on 5/18 with neutropenic fever, diarrhea, and abdominal pain and was found to have colitis on CT and RVP positive for coronavirus. She was transferred to [facility] overnight 5/18-5/19. On 5/19 AM, she had improved rapidly after initiation of IV cefepime and Flagyl. She had resolution of fever and improvement in abdominal pain and diarrhea. On 5/20, patient continued to improve with resolution of abdominal pain and diarrhea and resolution of neutropenia, thought to be related to delayed Neulasta effect. Given drastic clinical improvement and resolution of neutropenia, patient was discharged to home to complete course of oral antibiotics.     Prior to discharge, I reviewed with Alejandra Villegas the plan of care, including upcoming follow-up appointments and new medications. Appropriate prescriptions were sent to the discharge pharmacy. We reviewed strict discharge precautions, including reasons to call clinic triage or present to the ED, and she voiced understanding. She was provided with the clinic triage number, as well as written discharge instructions, in her discharge  "paperwork. Patient had an opportunity to ask questions, all of which were answered to their stated satisfaction. On the day of discharge, Alejandra Villegas was overall well-appearing, hemodynamically stable, and felt safe and comfortable with the plans for discharge to home with follow-up as described.    Discharge Medications  - Levofloxacin 750 mg daily and metronidazole 500 mg TID for 14 day course (5/20-6/2)  - Imodium PRN for diarrhea  - Robitussin PRN for cough    Follow Up  - Labs/PRN transfusions 3x/wk as scheduled at local clinic   - Follow up with Dr. Harris 5/22  - BMBx requested by Dr. Griffiths for week of 6/9 - sent follow-up message to scheduling  - Dr. Griffiths on 6/17    To Follow Outpatient  - Abdominal pain, diarrhea, fever    Hospital Course  Alejandra Villegas was admitted on 5/18/2025. The following problems were addressed during her hospitalization:    ID   # Neutropenic fever, resolved  # Neutropenic enterocolitis  # Coronavirus (not COVID-19)  Patient presented to [facility] ED on 5/18/25 with 3 days of abdominal cramping, described as intermittently sharp and periumbilical. Had also developed diarrhea that had been becoming increasingly watery. He had recorded temp at home of 102.5, thus presented to ED, where she reports she was \"out of it\" and weak. She denies nausea, bloating. Good PO intake. Patient also reported mild cough and congestion. Work-up showed RVP positive for coronavirus and CT A/P with \"mural edema affecting the duodenum and jejunum with some edema and mild lymph node enlargement in the subtending mesenteric and liquid stool throughout the colon where there is also borderline mural thickening. Findings would be compatible with a nonspecific acute/active enterocolitis in the proper clinical setting.\" On 5/19 after transfer to [facility], patient feels significantly improved after a few doses of IV antibiotics. She is ambulating around unit and outside, with improvement in strength and " "normalization of mental status. Received Neulasta on 5/12.   - Work up:   - BCx NGTD  - Sputum culture negative  - RVP + coronavirus  - COVID/flu/RSV negative  - UA with blood, no e/o infection  - Cdiff and enteric panel negative  - MRSA nares negative  - Lactic 1.1  - Lipase 11  - Procal 0.52  -   - CXR: \"Mild opacity at the left lung base is similar to the previous exam, and could be related to atelectasis or infection. The lungs are otherwise clear.\"  - CT A/P:   - Mural edema affecting the duodenum and jejunum with some edema and mild lymph node enlargement in the subtending mesenteric and liquid stool throughout the colon where there is also borderline mural thickening. Findings would be compatible with a nonspecific acute/active enterocolitis in the proper clinical setting.  - Diffuse hepatic steatosis and hepatomegaly.  - Infrarenal abdominal aortic aneurysm 3.6 x 3.2 cm diameter is unchanged.  - Faint hazy groundglass density opacity, interlobular septal thickening, bronchial wall thickening and thin bands of linear atelectasis throughout the visualized lungs.  - Antibiotics:   - Cefepime 5/18-5/20 ? levofloxacin 750 mg daily (5/20-6/2)  - Flagyl IV 5/18-5/20 ? Flagyl 500 mg QID PO (5/20-6/2)   - Plan for total 14d course of antibiotics for treatment of neutropenic colitis after resolution of neutropenia     # ID prophylaxis  - Acyclovir 400 mg BID  - Levaquin 250 mg daily - hold while on treatment-dose antibiotics as above  - Posaconazole 300 mg daily      HEME  # AML with NPM1 mutation  Follows with Kwan Harris and [facility]. Had been seen by PCP Dec 2024 w/ progressive fatigue and nausea where she was found leukopenic WBC  2.4 and neutropenic w/ ANC 0.3. Hgb 12. Was referred to local hematology/oncology clinic for further evaluation and seen by Dr. Samina Harris. BMBx 2/10/25 done at OSH showed hypercellular marrow w/ trilineage hematopoiesis w/ dyspoiesis with mildly elevated blasts of 4% on morphology. " NGS w/ NPM1, IDH2, and NRAS mutations. She was admitted to [facility] 2/26/25 for further workup and management. Slides were re-reviewed by [facility] Hematopathology, who noted hypercellular marrow with 8% blasts by morphology. Despite relatively low blast percentage, findings were felt most consistent with a diagnosis AML with NPM1 mutation by WHO 2022 (which does not require a specific blast threshold). Following interdepartmental discussions and review of the available literature, patient was started on intensive induction with 7+3 (Day 1=3/5/25). Midcycle BMBx 3/19/25 with no morphologic or immunophenotypic evidence of AML. D28 BMBx 3/31/25 with hypercellular marrow (60 to 70%) with no overt dysplasia or increase in blasts, noted flow with 4.3% blasts of unusual phenotype. Due to overall disposition timeline as well as patient living in [facility], preceded directly with consolidation chemotherapy with HiDAC (C1D1=4/3/25) which she tolerated well. Now, s/p C2 HiDAC (C2D1=5/6/2025).   - BMT consult on 4/16, no immediate plans for bone marrow transplant.  - Plan for BMBx prior to C3 of HiDAC per Dr. Griffiths     # Anemia  # Thrombocytopenia  Secondary to recent chemotherapy.   - Transfuse to keep Hgb >7, plt >10K     GI  # GERD  # History of intermittent epigastric pain  CT C/A/P 4/14/2025 with evidence of esophagitis (thought due to reflux/recent chemotherapy) and small hiatal hernia. Symptoms have historically improved on maximal medical therapy as below.  - Continue PTA Protonix BID, Pepcid BID, Carafate QID, TUMS PRN     PULM  # COPD  Longstanding history of COPD. No recent exacerbations. Most recent PFTs (2018) were normal.  - Continue PTA Breo Ellipta inhaler and PRN DuoNebs      # Tobacco use disorder  Has smoked since age 16, 1.5 ppd (roughly 65 pack years). Attempting to cut back as recently as 01/2025 to 0.5 ppd. Has historically declined any nicotine replacement or pharmacologic assistance. Tried nicotine  patch in March 2025 that patient feels lead to anxiety attack.   - Encourage smoking cessation  - Continue posaconazole ppx, regardless of ANC, given high risk for fungal pulmonary infection in the setting of ongoing tobacco abuse.     CV  # Essential hypertension  - Previously on lisinopril 10 mg, which was discontinued due to well managed blood pressures during prior admission     # Infrarenal abdominal aortic aneurysm  Noted incidentally on CT C/A/P (3/19/25), measuring 3.4 cm in diameter.  - Attention on follow-up imaging     RENAL/FEN  # Stage 2 CKD  Baseline Cr ~0.7. On admission Cr 0.65.   - Continue to monitor Cr     # Hyponatremia, resolved  Na 129 on admission, improved s/p mIVF of NS overnight. Likely hypovolemic hyponatremia.   - Monitor on daily CMP     # Hypophosphatemia, resolved  - Replete per protocol    # Hypokalemia   - Continue PTA potassium supplement     NEURO  # Chronic migraine w/o aura  # Chronic headaches  Present since childhood. Reportedly triggered by neck manipulation/movement. Reportedly well-managed with regimen below. Has never had LP but does not possess any high risk features for CNS involvement.   - Continue PTA, Tylenol, sumatriptan, and cyclobenzaprine PRN     MISC   # BROOKS  # MDD  - Continue PTA Paxil daily and Atarax PRN      # Degenerative disc disease  Appears likely multi-level; no MRI available for review, though note that patient has previously had epidural injections at L3-4 and L5-S1 (2016). Reported taking gabapentin 400 mg TID PRN prior to admission.   - Continue gabapentin 400 mg TID    # Deconditioning  - Cane ordered     Chronic Problems:  # Vitamin D deficiency - Continue PTA vit D supplement  # Seasonal allergies - Continue PTA Claritin   # Rheumatoid arthritis - Patient reported trying treatment though was unable to tolerate well. Managed with Tylenol PRN. Most recent RF >650 (2/10/25). JI negative.    # Prediabetes - Last A1c 6.0 x12/9/24. Has been managing  "with lifestyle modifications.   # Mixed hyperlipidemia - Continue PTA rosuvastatin  # H/o aortic stenosis - Patient noted on admission longstanding history of aortic stenosis. Pre-chemo echo w/ EF 60-65% and mild aortic stenosis and insufficiency. No previous echo to compare.    Clinically Significant Risk Factors        # Hypokalemia: Lowest K = 3 mmol/L in last 2 days, will replace as needed  # Hyponatremia: Lowest Na = 129 mmol/L in last 2 days, will monitor as appropriate  # Hypochloremia: Lowest Cl = 94 mmol/L in last 2 days, will monitor as appropriate  # Hyperchloremia: Highest Cl = 108 mmol/L in last 2 days, will monitor as appropriate          # Hypoalbuminemia: Lowest albumin = 3.3 g/dL at 5/20/2025  6:39 AM, will monitor as appropriate    # Coagulation Defect: INR = 1.25 (Ref range: 0.85 - 1.15) and/or PTT = 32 Seconds (Ref range: 22 - 38 Seconds), will monitor for bleeding  # Thrombocytopenia: Lowest platelets = 4 in last 2 days, will monitor for bleeding   # Hypertension: Noted on problem list            # Overweight: Estimated body mass index is 27.31 kg/m  as calculated from the following:    Height as of this encounter: 1.702 m (5' 7\").    Weight as of this encounter: 79.1 kg (174 lb 6.4 oz)., PRESENT ON ADMISSION       # Financial/Environmental Concerns: none  # Asthma: noted on problem list        Day of Discharge Summary    Interval History  No acute events overnight. Afebrile. Patient is feeling well today. Abdominal pain/cramping has resolved. Stools are not more solid. Denies nausea. Good PO intake. Very mild cough and congestion.     Vitals  Blood pressure 135/77, pulse 84, temperature 98.1  F (36.7  C), temperature source Oral, resp. rate 18, height 1.702 m (5' 7\"), weight 79.1 kg (174 lb 6.4 oz), last menstrual period 01/01/2007, SpO2 95%, not currently breastfeeding.    Physical Exam  General: Sitting up in bed, alert, NAD. Pleasant and conversational.  Skin: No concerning lesions, rash, " jaundice, cyanosis, erythema, or ecchymoses on exposed surfaces.   HEENT: NCAT. Anicteric sclera. MMM with no lesions, erythema, or thrush.   Respiratory: Non-labored breathing, good air exchange, lungs clear to auscultation bilaterally.  Cardiovascular: RRR. No murmur or rub.   Gastrointestinal: Normoactive BS. Abdomen soft, ND, NT. No palpable masses.  Extremities: No LE edema.   Neurologic: A&O x 3, speech normal, no deficits grossly.    Patient was seen and plan of care was discussed with attending physician Dr. Collins.    I spent 65 minutes in the care of this patient today, which included time necessary for review of interval events, obtaining history and physical exam, ordering medications/tests/procedures as medically indicated, review of pertinent medical literature, counseling of the patient, coordination of care, and documentation time. Over 50% of time was spent counseling the patient and/or coordinating care.    Janette Andino PA-C  Hematology/Oncology   Pager: [phone]  Desk phone: [phone]    Pending Results   These results will be followed up by outpatient team.   Unresulted Labs Ordered in the Past 30 Days of this Admission       Date and Time Order Name Status Description    5/19/2025 12:21 AM CONDITIONAL Prepare red blood cells (unit) Preliminary     5/18/2025 11:01 PM Urine Culture Aerobic Bacterial In process     5/18/2025 12:17 PM Blood Culture Peripheral blood (BC) Arm, Left Preliminary     5/18/2025 12:17 PM Blood Culture Peripheral blood (BC) Arm, Left Preliminary           Code Status   Full Code    Time Spent on this Encounter   I, Janette Andino PA-C, personally saw the patient today and spent greater than 30 minutes discharging this patient.    Discharge Disposition   Discharged to home  Condition at discharge: Stable    Consultations This Hospital Stay   CARE MANAGEMENT / SOCIAL WORK IP CONSULT  NURSING TO CONSULT FOR VIRTUAL CARE IP  PHYSICAL THERAPY ADULT IP  CONSULT  CARE MANAGEMENT / SOCIAL WORK IP CONSULT    Discharge Orders      Reason for your hospital stay    You were hospitalized for neutropenic fever and were found to have inflammation of your bowels (neutropenic colitis) and a cold virus (coronavirus, not COVID-19). You improved with antibiotics.     Activity    Your activity upon discharge: activity as tolerated     ADULT [facility] Specialty Follow-up and recommended labs and tests    - See attached for current follow up appointments  - A bone marrow biopsy has been requested for week of 6/9 by Dr. Griffiths    Appointments on [place] and/or [facility] (with [facility] or [facility] provider or service). Call [phone] if you haven't heard regarding these appointments within 7 days of discharge.     When to contact your care team    [facility] cancer clinic triage line at [phone] for temp > or = 100.4, uncontrolled nausea/vomiting/diarrhea/constipation, unrelieved pain, bleeding not relieved with pressure, dizziness, chest pain, shortness of breath, loss of consciousness, and any new or concerning symptoms.     Discharge Instructions    - Take levofloxacin and metronidazole antibiotics for a total 2 week course (5/20-6/2) for treatment of neutropenic colitis. Do not take your normal preventative levofloxacin dose (250 mg) while you are taking these. Of note, metronidazole pills can cause nausea in 10-12% of patients, and less commonly abdominal pain or diarrhea in 4% of patients.   - You can take imodium as needed for diarrhea. Let us know if your have worsening diarrhea, abdominal pain, or fever.   - The cold virus will resolve over time. You can take Rotibussin DM as needed for cough.     Full Code     Miscellaneous DME Order    DME Documentation:   Describe the reason for need to support medical necessity: COPD, AML with NPM1 mutation.     I, the undersigned, certify that the above prescribed supplies are medically necessary for this patient and is both  reasonable and necessary in reference to accepted standards of medical and necessary in reference to accepted standards of medical practice in the treatment of this patient's condition and is not prescribed as a convenience.     Diet    Follow this diet upon discharge: Regular adult diet     Discharge Medications   Current Discharge Medication List        START taking these medications    Details   guaiFENesin-dextromethorphan (ROBITUSSIN DM) 100-10 MG/5ML syrup Take 5 mLs by mouth every 4 hours as needed for cough.  Qty: 118 mL, Refills: 0    Associated Diagnoses: Coronavirus infection      loperamide (IMODIUM) 2 MG capsule Take 2 capsules (4 mg) by mouth 3 times daily as needed for diarrhea.  Qty: 10 capsule, Refills: 0    Associated Diagnoses: Neutropenic colitis      metroNIDAZOLE (FLAGYL) 500 MG tablet Take 1 tablet (500 mg) by mouth 3 times daily for 14 days.  Qty: 42 tablet, Refills: 0    Associated Diagnoses: Neutropenic fever; Neutropenic colitis           CONTINUE these medications which have CHANGED    Details   levofloxacin (LEVAQUIN) 750 MG tablet Take 1 tablet (750 mg) by mouth daily.  Qty: 13 tablet, Refills: 0    Associated Diagnoses: Neutropenic fever; Neutropenic colitis           CONTINUE these medications which have NOT CHANGED    Details   acetaminophen (TYLENOL) 325 MG tablet Take 2 tablets (650 mg) by mouth every 4 hours as needed for mild pain.  Qty: 90 tablet, Refills: 0    Associated Diagnoses: Acute myeloid leukemia in remission (H)      acyclovir (ZOVIRAX) 400 MG tablet Take 1 tablet (400 mg) by mouth 2 times daily.  Qty: 60 tablet, Refills: 0    Associated Diagnoses: 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)      albuterol (PROAIR HFA/PROVENTIL HFA/VENTOLIN HFA) 108 (90 Base) MCG/ACT inhaler Inhale 1-2 puffs into the lungs 4 times daily as needed (Refractory bronchospasm associated with hypersensitivity reaction).  Qty: 18 g, Refills: 0    Comments: Pharmacy may dispense  brand covered by insurance (Proair, or proventil or ventolin or generic albuterol inhaler)  Associated Diagnoses: 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)      albuterol (PROVENTIL) (2.5 MG/3ML) 0.083% neb solution Take 1 vial (2.5 mg) by nebulization 4 times daily as needed (Refractory bronchospasm associated with hypersensitivity reaction).  Qty: 90 mL, Refills: 0    Associated Diagnoses: 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)      budesonide-formoterol (SYMBICORT) 80-4.5 MCG/ACT Inhaler Inhale 2 puffs into the lungs 2 times daily - Rinse mouth well after use to prevent Thrush  Qty: 10.2 g, Refills: 11    Associated Diagnoses: COPD exacerbation (H); Pulmonary emphysema, unspecified emphysema type (H)      calcium carbonate (TUMS) 500 MG chewable tablet Take 1 tablet (500 mg) by mouth 3 times daily as needed for heartburn.  Qty: 90 tablet, Refills: 0    Associated Diagnoses: Gastroesophageal reflux disease, unspecified whether esophagitis present      cyanocobalamin (VITAMIN B-12) 500 MCG tablet Take 500 mcg by mouth daily.      cyclobenzaprine (FLEXERIL) 10 MG tablet Take 1 tablet (10 mg) by mouth 3 times daily as needed for muscle spasms.  Qty: 15 tablet, Refills: 0      famotidine (PEPCID) 20 MG tablet Take 1 tablet (20 mg) by mouth 2 times daily.  Qty: 60 tablet, Refills: 0    Associated Diagnoses: Gastroesophageal reflux disease, unspecified whether esophagitis present      fluticasone (FLONASE) 50 MCG/ACT nasal spray Spray 2 sprays into both nostrils 2 times daily.  Qty: 15.8 mL, Refills: 0    Comments: QS, 1 bottle  Associated Diagnoses: Dysfunction of both eustachian tubes; Rhinitis, unspecified type      gabapentin (NEURONTIN) 400 MG capsule Take 1 capsule (400 mg) by mouth 3 times daily.  Qty: 90 capsule, Refills: 0    Associated Diagnoses: Acute myeloid leukemia in remission (H)      guaiFENesin (MUCINEX) 600 MG 12 hr tablet Take 2 tablets (1,200 mg) by mouth 2 times  daily.  Qty: 30 tablet, Refills: 0    Associated Diagnoses: 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)      hydrOXYzine HCl (ATARAX) 25 MG tablet Take 1-2 tablets (25-50 mg) by mouth every 6 hours as needed for anxiety or itching (adjuvant pain, sleep).  Qty: 90 tablet, Refills: 3    Associated Diagnoses: 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)      loratadine (CLARITIN) 10 MG tablet Take 1 tablet (10 mg) by mouth daily.  Qty: 30 tablet, Refills: 0    Associated Diagnoses: 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)      ondansetron (ZOFRAN ODT) 4 MG ODT tab Take 1-2 tablets (4-8 mg) by mouth every 8 hours as needed for nausea or vomiting.  Qty: 45 tablet, Refills: 0    Associated Diagnoses: Nausea and vomiting, unspecified vomiting type      pantoprazole (PROTONIX) 40 MG EC tablet Take 1 tablet (40 mg) by mouth 2 times daily (before meals).  Qty: 60 tablet, Refills: 0    Associated Diagnoses: Acute myeloid leukemia in remission (H)      PARoxetine (PAXIL) 20 MG tablet Take 1 tablet (20 mg) by mouth every morning.  Qty: 30 tablet, Refills: 0    Associated Diagnoses: Chronic anxiety      posaconazole (NOXAFIL) 100 MG DR tablet Take 3 tablets (300 mg) by mouth every morning.  Qty: 90 tablet, Refills: 0    Associated Diagnoses: Acute myeloid leukemia in remission (H)      potassium chloride ER (K-TAB) 20 MEQ CR tablet Take 20 mEq by mouth daily.      prochlorperazine (COMPAZINE) 5 MG tablet Take 1 tablet (5 mg) by mouth every 6 hours as needed for nausea or vomiting.  Qty: 30 tablet, Refills: 0    Associated Diagnoses: 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)      rosuvastatin (CRESTOR) 20 MG tablet Take 1 tablet (20 mg) by mouth daily.  Qty: 30 tablet, Refills: 0    Associated Diagnoses: Mixed hyperlipidemia      sucralfate (CARAFATE) 1 GM/10ML suspension Take 10 mLs (1 g) by mouth 4 times daily (before meals and nightly).  Qty: 473 mL, Refills: 0     Associated Diagnoses: Acute myeloid leukemia in remission (H)      SUMAtriptan (IMITREX) 50 MG tablet Take 1 tablet (50 mg) by mouth at onset of headache for migraine.  Qty: 30 tablet, Refills: 0    Associated Diagnoses: Other migraine without status migrainosus, not intractable      Vitamin D3 (CHOLECALCIFEROL) 25 mcg (1000 units) tablet Take 1 tablet (25 mcg) by mouth daily.  Qty: 30 tablet, Refills: 0    Associated Diagnoses: Vitamin D deficiency      order for DME Equipment being ordered: home neb set up with mask and tubing  Qty: 1 Device, Refills: 0    Associated Diagnoses: Bronchitis, asthmatic, mild persistent, with acute exacerbation           STOP taking these medications       ipratropium - albuterol 0.5 mg/2.5 mg/3 mL (DUONEB) 0.5-2.5 (3) MG/3ML neb solution Comments:   Reason for Stopping:         prednisoLONE acetate (PRED FORTE) 1 % ophthalmic suspension Comments:   Reason for Stopping:             Allergies   Allergies   Allergen Reactions    Adhesive Tape     Bee Pollen Swelling     Seasonal    Bee Venom Unknown    Folic Acid Itching    Pollen Extract      Seasonal    Amoxicillin Rash    Chlorhexidine Rash     After several weeks, started to develop rash on R neck down to chest and arm. Also noted on back of knees. Providers suggesting related to CHG as nothing else is new for pt.     Liquid Adhesive Rash    Meloxicam Rash    Nabumetone GI Disturbance     GI upset     Data   Most Recent 3 CBC's:  Recent Labs   Lab Test 05/20/25  0639 05/19/25  0640 05/18/25  2339   WBC 11.3* 2.2* 1.5*   HGB 7.2* 7.8* 6.8*   MCV 89 87 85   PLT 30* 37* 48*      Most Recent 3 BMP's:  Recent Labs   Lab Test 05/20/25  0639 05/20/25  0251 05/19/25  1922 05/19/25  0639 05/18/25  1257     --   --  138 129*   POTASSIUM 3.3* 3.6 3.2* 3.0* 3.5   CHLORIDE 108*  --   --  107 94*   CO2 19*  --   --  20* 22   BUN 11.7  --   --  13.2 13.1   CR 0.62  --   --  0.53 0.65   ANIONGAP 13  --   --  11 13   TOBIN 9.1  --   --  9.0 9.1    *  --   --  102* 128*     Most Recent 2 LFT's:  Recent Labs   Lab Test 05/20/25  0639 05/19/25  0639   AST 20 14   ALT 12 14   ALKPHOS 144 131   BILITOTAL 0.3 1.1     Most Recent INR's and Anticoagulation Dosing History:  Anticoagulation Dose History  More data exists         Latest Ref Rng & Units 4/2/2025 4/4/2025 5/6/2025 5/7/2025 5/8/2025 5/9/2025 5/18/2025   Recent Dosing and Labs   INR 0.85 - 1.15 1.12  1.11  1.11  1.10  1.08  1.07  1.25      Most Recent 3 Troponin's:No lab results found.  Most Recent Cholesterol Panel:  Recent Labs   Lab Test 01/14/25  1052   CHOL 163   LDL 92   HDL 45*   TRIG 130     Most Recent 6 Bacteria Isolates From Any Culture (See EPIC Reports for Culture Details):No lab results found.  Most Recent TSH, T4 and A1c Labs:  Recent Labs   Lab Test 12/09/24  1513   A1C 6.0*       Results for orders placed or performed during the hospital encounter of 05/18/25   CT Abdomen Pelvis w Contrast    Narrative    EXAM: CT ABDOMEN PELVIS W CONTRAST  [facility]ATION: [facility]  DATE: 5/18/2025    INDICATION: pancytopenic AML pt, abd pain, diarrhea, fever  COMPARISON: CTs CAP 4/14/2025 and 3/19/2025  TECHNIQUE: CT scan of the abdomen and pelvis was performed following injection of IV contrast. Multiplanar reformats were obtained. Dose reduction techniques were used.  CONTRAST: 103mL Isovue 370    FINDINGS:   LOWER CHEST: Faint hazy groundglass density opacity, interlobular septal thickening, bronchial wall thickening and thin bands of linear atelectasis throughout the visualized lungs. No pleural effusion. Heart size normal with no pericardial effusion.   Aortic valve leaflet and three-vessel coronary artery calcification.    HEPATOBILIARY: Diffuse hepatic steatosis and hepatomegaly (craniocaudal dimension right hepatic lobe 22 cm) Liver is otherwise normal. No bile duct dilatation. Cholecystectomy.    PANCREAS: Normal.    SPLEEN: Spleen size normal at 11 cm  unchanged.    ADRENAL GLANDS: Normal.    KIDNEYS/BLADDER: Kidneys, ureters and bladder are normal.    BOWEL: Mural edema affecting the duodenum and jejunum with some edema and mild lymph node enlargement in the subtending mesenteric and liquid stool throughout the colon where there is also borderline mural thickening. Findings would be compatible with a   nonspecific acute/active enterocolitis in the proper clinical setting. Normal appendix. No bowel obstruction. No free air. No free fluid.    LYMPH NODES: No lymphadenopathy.    VASCULATURE: Infrarenal abdominal aortic aneurysm 3.6 x 3.2 cm diameter is unchanged. Calcified atheromatous plaque at the origin of the mesenteric and renal arteries.    PELVIC ORGANS: Hysterectomy. Pelvis otherwise unremarkable.    MUSCULOSKELETAL: No bone lesion or fracture. Bones appear demineralized. Degenerative disc disease lumbosacral interspace. Generator device left gluteal region with left transsacral electrode.      Impression    IMPRESSION:   1.  Mural edema affecting the duodenum and jejunum with some edema and mild lymph node enlargement in the subtending mesenteric and liquid stool throughout the colon where there is also borderline mural thickening. Findings would be compatible with a   nonspecific acute/active enterocolitis in the proper clinical setting.  2.  Diffuse hepatic steatosis and hepatomegaly.  3.  Infrarenal abdominal aortic aneurysm 3.6 x 3.2 cm diameter is unchanged.  4.  Faint hazy groundglass density opacity, interlobular septal thickening, bronchial wall thickening and thin bands of linear atelectasis throughout the visualized lungs.    Aorta recommendation: Recommend followup by ultrasound in 2 years.     XR Chest 2 Views    Narrative    EXAM: XR CHEST 2 VIEWS  LOCATION: [facility]  DATE: 5/18/2025    INDICATION: Fever of unknown origin.  COMPARISON: 4/27/2025.      Impression    IMPRESSION: Mild opacity at the left lung base is  similar to the previous exam, and could be related to atelectasis or infection. The lungs are otherwise clear. Aortic calcification. Heart size and pulmonary vascularity are within normal limits. No   pleural effusions.

## 2025-05-20 NOTE — PLAN OF CARE
Physical Therapy: Orders received. Chart reviewed and discussed with care team.? Physical Therapy not indicated due to rehab screen completed, PT observed pt complete transfers and ambulation safely without use of assistive device or assist from others. Per chart, pt is up ambulating in halls and leaving unit to smoke regularly. No therapy needs at this time.? Defer discharge recommendations to medical team.? Will complete orders.

## 2025-05-20 NOTE — DISCHARGE INSTRUCTIONS
To obtain a Personal Care Attendant (PCA) in [facility], you must first have an assessment completed by a Public Health Nurse. This assessment determines if you need PCA services and if they are appropriate for your needs. The assessment is conducted at no charge in your home. After the assessment, you can choose an agency to provide your PCA services, which can be from any agency in the [facility]. The PCA services are available to persons covered by Medical Assistance, and you can request an assessment through your [facility] social service office or the Personal Care Assistance agency you have chosen.    Request a Public [facility] Intake: [phone]    In order to access PCA services, a person must need hands-on assistance with two of the following activities of daily living (ADLs):  Bathing  Dressing  Eating  Grooming  Mobility  Positioning  Toileting  Transferring    Non-Eligible Persons & Activities  PCA is not the appropriate services for individuals requesting assistance with:  Homemaking  Shopping  Laundry  Running errands

## 2025-05-20 NOTE — PROGRESS NOTES
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 (Red Flag symptoms escalate to triage hotline per guidelines): Improved  Do you know how to contact your clinic care team if you have future questions or changes to your health status? : Yes  Does the patient have their discharge instructions? : Yes  Does the patient have questions regarding their discharge instructions? : No  Were you started on any new medications or were there changes to any of your previous medications? : Yes  Does the patient have all of their medications?: Yes  Do you have questions regarding any of your medications? : No  Do you have all of your needed medical supplies or equipment (DME)?  (i.e. oxygen tank, CPAP, cane, etc.): Yes         Cancer Care  RNCC Short Symptom Review:     Assessment completed with:: Patient    General/Short Assessment  Does the patient have all their medications?: Yes  Is the patient experiencing any new or worsening symptoms?: No  Does the patient need any referrals to supportive care services?: No  Discussion with patient: Answered patient's questions and addressed concerns, Reviewed how and when to contact clinic, Reviewed patient's future appointments    Pain  Patient Reported Pain?: Yes, is currently well-managed  Pain Score: Moderate Pain (6)  Pain Loc: Low Back    Patient Coping  Accepting    Clinic Utilization  Patient Aware of Next Appointment?:  Yes    Other Patient Concerns  Other Patient Reported Concerns: No    Follow up Plan     Discharge Follow-Up  Discharge follow up appointment scheduled in alignment with recommended follow up timeframe or Transitions of Risk Category? (Low = within 30 days; Moderate= within 14 days; High= within 7 days): Yes  Discharge Follow Up Appointment Date: 05/22/25  Discharge Follow Up Appointment Scheduled with?: Specialty Care Provider    Future Appointments   Date Time Provider Department Center   5/21/2025  1:10 PM [facility] [place]   5/22/2025 12:20 PM [facility] [place]   5/22/2025 12:30 PM [facility] [place]   5/23/2025  1:00 PM [facility] [place]   5/28/2025  1:00 PM [facility] [place]   5/30/2025  1:00 PM [facility] [place]   6/2/2025  1:00 PM [facility] [place]   6/4/2025  1:00 PM [facility] [place]   6/6/2025  1:10 PM [facility] [place]   6/10/2025  8:00 AM  [facility]   6/10/2025  9:00 AM Lianna Velasquez PA-C [facility]   6/17/2025  1:00 PM Manuel Griffiths MD [facility]       Outpatient Plan as outlined on AVS reviewed with patient.    For any urgent concerns, please contact our 24 hour clinic line:          Lisha Winkler RN

## 2025-05-20 NOTE — PLAN OF CARE
Goal Outcome Evaluation:  Significant 24 hour events: potential discharge tomorrow. Abdominal pain improved. Loose stools decreased. Phos replaced orally. K+ replaced orally.  Goes outside to smoke - pt aware that when she goes off unit she may miss provider visits, Labs, medications and nursing interventions when she leaves the floor. Pt aware she needs to alert staff when she leaves unit.         Level of Care: Acute    Summary Type: 5A Report   Pain:  Controlled w/ flexeril and sched meds.  Neuro:WDL x numbness in L foot at baseline.   CV:WDL  Resp:.WDL except, breath sounds RA, goes outside to smoke.   GI:.WDL except, stool loose stools - improving.   :WDL red urine - improved, UA sent down.  Skin: .WDL except, integrity PIV x 2  Activity Assistance:   UA;   Safety/Falls Risk: Level of Risk: Moderate Risk  Consults:   Procedures/Imaging:   Precautions: Isolation Precautions Droplet, Enteric       Plan of Care Reviewed With: patient    Overall Patient Progress: improvingOverall Patient Progress: improving

## 2025-05-20 NOTE — PLAN OF CARE
"Goal Outcome Evaluation:      Plan of Care Reviewed With: patient    Overall Patient Progress: improvingOverall Patient Progress: improving    Outcome Evaluation: Discharge 12:45    /77 (BP Location: Right arm)   Pulse 84   Temp 98.1  F (36.7  C) (Oral)   Resp 18   Ht 1.702 m (5' 7\")   Wt 79.1 kg (174 lb 6.4 oz)   LMP 01/01/2007 (Approximate)   SpO2 95%   BMI 27.31 kg/m      A&O x4, UAL, walked around unit, went outside to smoke.  IV cefepime and metronidazole discontinued, PO levofloxacin and metronidazole administered. Pt denies abdominal discomfort, nausea/vomiting. GUOP. Pt reported soft stool and improved diarrhea.  PIV discontinued.   Plan for Bone marrow biopsy on 6/9    Pain:  Controlled   Neuro:WDL   CV:WDL  Resp:.WDL except, breath sounds RA, goes outside to smoke.   GI:.WDL except, stool loose stools - improving.   :WDL   Skin: WDL  Activity Assistance: independent UA;   Safety/Falls Risk: Level of Risk: Moderate Risk           Nursing Focus: Discharge    D: Patient discharged to home at 1245. Patient accompanied by herself down to lobby where sister will pickup.     I: Discharge prescriptions sent to discharge pharmacy to be filled. All discharge medications and instructions reviewed with patient by bedside RN. Patient instructed to call clinic triage nurse if patient experiences a fever >100.4, uncontrolled nausea, vomiting, diarrhea, or pain; or experiences any signs or symptoms of bleeding. Other phone numbers to call with questions or concerns after discharge reviewed. PIV removed. Education completed.    A: Patient verbalized understanding of discharge medications and instructions. Patient will  medications at discharge pharmacy.     P: Patient to follow-up in clinic on 5/21 for labs.       "

## 2025-05-20 NOTE — CONSULTS
Care Management Discharge Note    Discharge Date: 05/20/2025       Discharge Disposition: Home    Discharge Services: None    Discharge DME:  (Cane)    Discharge Transportation: family or friend will provide    Private pay costs discussed: Not applicable    Does the patient's insurance plan have a 3 day qualifying hospital stay waiver?  No    PAS Confirmation Code:    Patient/family educated on Medicare website which has current facility and service quality ratings: no    Education Provided on the Discharge Plan: Yes  Persons Notified of Discharge Plans: Pt  Patient/Family in Agreement with the Plan: yes    Handoff Referral Completed: Yes, non-[facility] PCP: External handoff communication completed    Additional Information:  RNCC notified Pt is medically ready for discharge. Pt will discharge home with OP Labs/Transfusion follow up. Lab apt. Confirmed for 5/21 and on AVS.    PT will issue cane prior to discharge.    RNCC placed resources for obtaining PCA services in [facility]. It is not anticipated Pt will qualify for PCA services at this time. RNCC attempted to meet with Pt x 2, not in her room, cell phone turned off.     No further discharge needs. Pt's sister will transport home.       MAC Schafer  Care Management Department  Covering 5A: 9176-1988 & 5C: [id number] (non-BMT)   Phone: [phone]  Teams  Vocera: weekdays 8:00 am - 4:30 pm.   See Vocera Care Team for off-day coverage

## 2025-05-21 ENCOUNTER — TELEPHONE (OUTPATIENT)
Dept: ONCOLOGY | Facility: OTHER | Age: 60
End: 2025-05-21

## 2025-05-21 ENCOUNTER — LAB (OUTPATIENT)
Dept: LAB | Facility: OTHER | Age: 60
End: 2025-05-21
Payer: MEDICARE

## 2025-05-21 DIAGNOSIS — Z09 HOSPITAL DISCHARGE FOLLOW-UP: ICD-10-CM

## 2025-05-21 DIAGNOSIS — C92.00 ACUTE MYELOID LEUKEMIA NOT HAVING ACHIEVED REMISSION (H): ICD-10-CM

## 2025-05-21 LAB
ALBUMIN SERPL BCG-MCNC: 3.6 G/DL (ref 3.5–5.2)
ALP SERPL-CCNC: 215 U/L (ref 40–150)
ALT SERPL W P-5'-P-CCNC: 14 U/L (ref 0–50)
ANION GAP SERPL CALCULATED.3IONS-SCNC: 13 MMOL/L (ref 7–15)
AST SERPL W P-5'-P-CCNC: 32 U/L (ref 0–45)
BASOPHILS # BLD MANUAL: 0 10E3/UL (ref 0–0.2)
BASOPHILS NFR BLD MANUAL: 0 %
BILIRUB SERPL-MCNC: 0.3 MG/DL
BUN SERPL-MCNC: 8.1 MG/DL (ref 6–20)
CALCIUM SERPL-MCNC: 9 MG/DL (ref 8.8–10.4)
CHLORIDE SERPL-SCNC: 101 MMOL/L (ref 98–107)
CREAT SERPL-MCNC: 0.62 MG/DL (ref 0.51–0.95)
EGFRCR SERPLBLD CKD-EPI 2021: >90 ML/MIN/1.73M2
EOSINOPHIL # BLD MANUAL: 0 10E3/UL (ref 0–0.7)
EOSINOPHIL NFR BLD MANUAL: 0 %
ERYTHROCYTE [DISTWIDTH] IN BLOOD BY AUTOMATED COUNT: 18.1 % (ref 10–15)
GLUCOSE SERPL-MCNC: 107 MG/DL (ref 70–99)
HCO3 SERPL-SCNC: 24 MMOL/L (ref 22–29)
HCT VFR BLD AUTO: 22.1 % (ref 35–47)
HGB BLD-MCNC: 7.5 G/DL (ref 11.7–15.7)
LYMPHOCYTES # BLD MANUAL: 2.6 10E3/UL (ref 0.8–5.3)
LYMPHOCYTES NFR BLD MANUAL: 16 %
MCH RBC QN AUTO: 29.3 PG (ref 26.5–33)
MCHC RBC AUTO-ENTMCNC: 33.9 G/DL (ref 31.5–36.5)
MCV RBC AUTO: 86 FL (ref 78–100)
METAMYELOCYTES # BLD MANUAL: 0.5 10E3/UL
METAMYELOCYTES NFR BLD MANUAL: 3 %
MONOCYTES # BLD MANUAL: 1 10E3/UL (ref 0–1.3)
MONOCYTES NFR BLD MANUAL: 6 %
MYELOCYTES # BLD MANUAL: 0.3 10E3/UL
MYELOCYTES NFR BLD MANUAL: 2 %
NEUTROPHILS # BLD MANUAL: 12 10E3/UL (ref 1.6–8.3)
NEUTROPHILS NFR BLD MANUAL: 73 %
PLAT MORPH BLD: NORMAL
PLATELET # BLD AUTO: 38 10E3/UL (ref 150–450)
POTASSIUM SERPL-SCNC: 3.5 MMOL/L (ref 3.4–5.3)
PROT SERPL-MCNC: 6.7 G/DL (ref 6.4–8.3)
RBC # BLD AUTO: 2.56 10E6/UL (ref 3.8–5.2)
RBC MORPH BLD: NORMAL
SODIUM SERPL-SCNC: 138 MMOL/L (ref 135–145)
WBC # BLD AUTO: 16.4 10E3/UL (ref 4–11)

## 2025-05-21 PROCEDURE — 36415 COLL VENOUS BLD VENIPUNCTURE: CPT | Mod: ZL

## 2025-05-21 PROCEDURE — 85027 COMPLETE CBC AUTOMATED: CPT | Mod: ZL

## 2025-05-21 PROCEDURE — 84132 ASSAY OF SERUM POTASSIUM: CPT | Mod: ZL

## 2025-05-21 PROCEDURE — 85007 BL SMEAR W/DIFF WBC COUNT: CPT | Mod: ZL

## 2025-05-21 NOTE — TELEPHONE ENCOUNTER
DATE/TIME OF CALL RECEIVED FROM LAB:  05/21/25 at 1:38 PM   LAB TEST:  Platelets  LAB VALUE:  Platelets-38  Also noted: WBC is at 16.4, Hemoglobin is at 7.5  PROVIDER NOTIFIED?: Yes  PROVIDER NAME: Magalis Day/Dr. Harris   DATE/TIME LAB VALUE REPORTED TO PROVIDER: 5-21-25 1:39 PM  MECHANISM OF PROVIDER NOTIFICATION: Face-To-Face/written   PROVIDER RESPONSE: Pending Kimberly/provider response per [facility] verbal order that was read back and verified: please forward to Dr. Harris.   Please review  Shannon Manzano LPN...................5/21/2025   1:39 PM

## 2025-05-22 ENCOUNTER — ONCOLOGY VISIT (OUTPATIENT)
Dept: ONCOLOGY | Facility: OTHER | Age: 60
End: 2025-05-22
Payer: MEDICARE

## 2025-05-22 ENCOUNTER — TELEPHONE (OUTPATIENT)
Dept: PHARMACY | Facility: OTHER | Age: 60
End: 2025-05-22
Payer: MEDICARE

## 2025-05-22 VITALS
WEIGHT: 179 LBS | SYSTOLIC BLOOD PRESSURE: 140 MMHG | HEART RATE: 92 BPM | DIASTOLIC BLOOD PRESSURE: 81 MMHG | TEMPERATURE: 99.6 F | BODY MASS INDEX: 28.04 KG/M2 | OXYGEN SATURATION: 94 % | RESPIRATION RATE: 16 BRPM

## 2025-05-22 DIAGNOSIS — C92.00 ACUTE MYELOID LEUKEMIA NOT HAVING ACHIEVED REMISSION (H): Primary | ICD-10-CM

## 2025-05-22 LAB
ABO + RH BLD: NORMAL
BACTERIA SPEC CULT: NORMAL
BACTERIA SPEC CULT: NORMAL
BLD GP AB SCN SERPL QL: NEGATIVE
SPECIMEN EXP DATE BLD: NORMAL

## 2025-05-22 RX ORDER — DEXTROMETHORPHAN HYDROBROMIDE, GUAIFENESIN 20; 200 MG/20ML; MG/20ML
SOLUTION ORAL
COMMUNITY
Start: 2025-05-20

## 2025-05-22 ASSESSMENT — PAIN SCALES - GENERAL: PAINLEVEL_OUTOF10: MODERATE PAIN (6)

## 2025-05-22 NOTE — TELEPHONE ENCOUNTER
MTM referral from: Transitions of Care (recent hospital discharge, TCU discharge, or ED visit)    MTM referral outreach attempt #1 on May 22, 2025 at 12:17 PM      Outcome: Spoke with patient declined    Use [facility] [place], USE [facility], MARIA DEL ROSARIO discharged 5/20 for the carrier/Plan on the flowsheet          Tamara Lyons [facility]  -[facility]  [phone]

## 2025-05-22 NOTE — PROGRESS NOTES
HEMATOLOGY FOLLOW-UP NOTE  May 22, 2025    Reason for follow-up: Acute myeloid leukemia NPM1 mutation    HISTORY OF PRESENT ILLNESS  Alejandra Villegas is a 59 year old female with Pmh as stated below who is seen in the hematology clinic for anemia and neutropenia    Her history in short is as follows:    Ms. Villegas was found to have a WBC count of 2.4 with ANC of 0.3 with a hemoglobin of 12 with MCV of 112. Prior to that was seen to have a hemoglobin of 14.8 with MCV of 95 and WBC of 8.4.     2/13/2025: Bone marrow biopsy with aspiration: Blast count was 4%.  Bone marrow cellularity 85%: Myeloid activated NGS showed NPMI mutation with VAF 37%.NGS also showed IDH 2 and NRAS mutation    As based on the WHO  2022 classification, this would be considered AML she was transferred to the [facility].    Following multidisciplinary discussion that she was subsequently started on treatment    3/5/2025: Received induction with 7+ 3.  Her course was complicated by neutropenic fevers-4 episodes.  She also developed severe epigastric pain with reflux symptoms.  GI was consulted.  EGD at that time was deferred due to her pancytopenia.  Imaging showed esophagitis and hiatal hernia. Symptoms improved with optimization of GERD therapy    3/19/2025: Bone marrow biopsy with aspiration: No morphological or no phenotypic evidence of acute myeloid leukemia.  Hypercellular marrow for age, cellularity estimated at less than 5%    3/31/2025: Bone marrow biopsy: Hypercellular for age-cellularity estimated 60 to 70% with trilineage marrow hematopoietic maturation, no overt dysplasia and increase in blasts.    4/3/2025: HiDAC.  Neulasta received 4/7/2025.    5/6/2025: Received cycle 2 of HiDAC.  Also received Neulasta 5/12/2025 5/18/2025 to 5/20/2025: Was admitted with fever.  Was initially found to be neutropenic.  Also found to have enterocolitis and was positive for coronavirus.  Received to cefepime and Flagyl inpatient and then  discharged with Levaquin and Flagyl.  Will finish 6/2/2025    Interim history    Doing well.  She has recovered from her illness.  Denies any more diarrhea.  Cough is improved.  Denies any headache or dizziness.  Denies any nausea.  Denies any bleeding.    REVIEW OF SYSTEMS  A 12-point ROS negative except as in HPI      Current Outpatient Medications   Medication Sig Dispense Refill    acetaminophen (TYLENOL) 325 MG tablet Take 2 tablets (650 mg) by mouth every 4 hours as needed for mild pain. 90 tablet 0    acyclovir (ZOVIRAX) 400 MG tablet Take 1 tablet (400 mg) by mouth 2 times daily. 60 tablet 0    albuterol (PROAIR HFA/PROVENTIL HFA/VENTOLIN HFA) 108 (90 Base) MCG/ACT inhaler Inhale 1-2 puffs into the lungs 4 times daily as needed (Refractory bronchospasm associated with hypersensitivity reaction). 18 g 0    albuterol (PROVENTIL) (2.5 MG/3ML) 0.083% neb solution Take 1 vial (2.5 mg) by nebulization 4 times daily as needed (Refractory bronchospasm associated with hypersensitivity reaction). 90 mL 0    budesonide-formoterol (SYMBICORT) 80-4.5 MCG/ACT Inhaler Inhale 2 puffs into the lungs 2 times daily - Rinse mouth well after use to prevent Thrush 10.2 g 11    calcium carbonate (TUMS) 500 MG chewable tablet Take 1 tablet (500 mg) by mouth 3 times daily as needed for heartburn. 90 tablet 0    cyanocobalamin (VITAMIN B-12) 500 MCG tablet Take 500 mcg by mouth daily.      cyclobenzaprine (FLEXERIL) 10 MG tablet Take 1 tablet (10 mg) by mouth 3 times daily as needed for muscle spasms. 15 tablet 0    famotidine (PEPCID) 20 MG tablet Take 1 tablet (20 mg) by mouth 2 times daily. 60 tablet 0    fluticasone (FLONASE) 50 MCG/ACT nasal spray Spray 2 sprays into both nostrils 2 times daily. 15.8 mL 0    FT TUSSIN DM ADULT  MG/20ML LIQD       gabapentin (NEURONTIN) 400 MG capsule Take 1 capsule (400 mg) by mouth 3 times daily. 90 capsule 0    guaiFENesin (MUCINEX) 600 MG 12 hr tablet Take 2 tablets (1,200 mg) by mouth  2 times daily. 30 tablet 0    guaiFENesin-dextromethorphan (ROBITUSSIN DM) 100-10 MG/5ML syrup Take 5 mLs by mouth every 4 hours as needed for cough. 118 mL 0    hydrOXYzine HCl (ATARAX) 25 MG tablet Take 1-2 tablets (25-50 mg) by mouth every 6 hours as needed for anxiety or itching (adjuvant pain, sleep). 90 tablet 3    levofloxacin (LEVAQUIN) 750 MG tablet Take 1 tablet (750 mg) by mouth daily. 13 tablet 0    loperamide (IMODIUM) 2 MG capsule Take 2 capsules (4 mg) by mouth 3 times daily as needed for diarrhea. 10 capsule 0    loratadine (CLARITIN) 10 MG tablet Take 1 tablet (10 mg) by mouth daily. 30 tablet 0    metroNIDAZOLE (FLAGYL) 500 MG tablet Take 1 tablet (500 mg) by mouth 3 times daily for 14 days. 42 tablet 0    ondansetron (ZOFRAN ODT) 4 MG ODT tab Take 1-2 tablets (4-8 mg) by mouth every 8 hours as needed for nausea or vomiting. 45 tablet 0    order for DME Equipment being ordered: home neb set up with mask and tubing 1 Device 0    pantoprazole (PROTONIX) 40 MG EC tablet Take 1 tablet (40 mg) by mouth 2 times daily (before meals). 60 tablet 0    PARoxetine (PAXIL) 20 MG tablet Take 1 tablet (20 mg) by mouth every morning. 30 tablet 0    posaconazole (NOXAFIL) 100 MG DR tablet Take 3 tablets (300 mg) by mouth every morning. 90 tablet 0    potassium chloride ER (K-TAB) 20 MEQ CR tablet Take 20 mEq by mouth daily.      prochlorperazine (COMPAZINE) 5 MG tablet Take 1 tablet (5 mg) by mouth every 6 hours as needed for nausea or vomiting. 30 tablet 0    rosuvastatin (CRESTOR) 20 MG tablet Take 1 tablet (20 mg) by mouth daily. 30 tablet 0    sucralfate (CARAFATE) 1 GM/10ML suspension Take 10 mLs (1 g) by mouth 4 times daily (before meals and nightly). 473 mL 0    SUMAtriptan (IMITREX) 50 MG tablet Take 1 tablet (50 mg) by mouth at onset of headache for migraine. 30 tablet 0    Vitamin D3 (CHOLECALCIFEROL) 25 mcg (1000 units) tablet Take 1 tablet (25 mcg) by mouth daily. 30 tablet 0       Allergies   Allergen  Reactions    Adhesive Tape     Bee Pollen Swelling     Seasonal    Bee Venom Unknown    Folic Acid Itching    Pollen Extract      Seasonal    Amoxicillin Rash    Chlorhexidine Rash     After several weeks, started to develop rash on R neck down to chest and arm. Also noted on back of knees. Providers suggesting related to CHG as nothing else is new for pt.     Liquid Adhesive Rash    Meloxicam Rash    Nabumetone GI Disturbance     GI upset     Immunization History   Administered Date(s) Administered    COVID-19 Monovalent 18+ (Moderna) 04/29/2021, 06/01/2021    Influenza Vaccine >6 months,quad, PF 12/02/2014, 01/02/2017    Mantoux Tuberculin Skin Test 10/19/2015    Pneumococcal 23 valent 09/26/2007    TD,PF 7+ (Tenivac) 05/23/2005    TDAP Vaccine (Boostrix) 02/20/2014       Past Medical History:   Diagnosis Date    Allergic rhinitis 09/27/2011         Disorder of kidney and ureter 08/08/2008    Kidney disease GFR 87    Dorsalgia     No Comments Provided    Generalized anxiety disorder     No Comments Provided    Hidradenitis suppurativa     No Comments Provided    Other disorders of lung (CODE) 08/01/2007    Pulmonary nodules, stable on follow-up chest CT 12/08.  No further imaging recommended.    Other specified disorders of Eustachian tube, unspecified ear 02/27/2012         Personal history of other medical treatment (CODE)     Childbirth x4    Rheumatoid arthritis (H) 02/27/2012         Tobacco use     No Comments Provided       Past Surgical History:   Procedure Laterality Date    ARTHROSCOPY KNEE  05/2017    Dr Torres    ARTHROSCOPY KNEE  07/20/2017    Dr Garza, lateral release, meniscal repair    ARTHROTOMY WRIST Left 2011    Dr. Torres    BONE MARROW BIOPSY, BONE SPECIMEN, NEEDLE/TROCAR Left 02/13/2025    Procedure: Bone marrow biopsy, bone specimen, needle/trocar;  Surgeon: Hema Mari MD;  Location: [facility] OR    CHOLECYSTECTOMY  06/2007    Emergency tracheotomy following failed intubation for laparoscopic  cholecystectomy.    DILATION AND CURETTAGE  09/2006         HERNIA REPAIR  2007    Incisoinal hernia repair    HYSTERECTOMY TOTAL ABDOMINAL  02/2010         IMPLANT STIMULATOR AND LEADS SACRAL NERVE (STAGE ONE AND TWO) N/A 12/11/2018    Procedure: Interstim Battery Replacement;  Surgeon: Rl Ambriz MD;  Location:  OR    INTERSTIM DEVICE STAGE 2  01/06/2014    Dr. Ambriz - [facility]    LAPAROSCOPIC ABLATION ENDOMETRIOSIS  09/2006         OOPHORECTOMY Left 2010     Dr Thorpe    PICC INSERTION - DOUBLE LUMEN Right 03/05/2025    5FR, DL, total cath length=42 CM, visible cath length= 3CM, brachial medial vein    PICC INSERTION - DOUBLE LUMEN Right 05/06/2025    44-3Ccm, Basilic vein    RECONSTRUCT FOREFOOT WITH METATARSOPHALANGEAL (MTP) FUSION Right 05/05/2022    Procedure: Right fibular sesmoid excision and 1st metatarsal phalangeal joint fusion;  Surgeon: Rl Nathan DPM;  Location: [facility] OR    RELEASE CARPAL TUNNEL  02/02/2017         RELEASE PLANTAR FASCIA Left 12/19/2024    Procedure: excision of soft tissue mass left foot,  gastroc recession;  Surgeon: Rl Nathan DPM;  Location:  OR    REMOVE HARDWARE FOOT Right 08/10/2023    Procedure: REMOVAL, HARDWARE, FOOT;  Surgeon: Rl Nathan DPM;  Location: [facility] OR    SALPINGO OOPHORECTOMY,R/L/KELSEY Right 06/1999    Right salpingo-oophorectomy for hemorrhagic corpus luteum cyst    TRACHEOSTOMY  2007    emergency following failed intubation during cholecystectomy    TYMPANOSTOMY, LOCAL/TOPICAL ANESTHESIA  08/2006    PE tube placement       SOCIAL HISTORY  History   Smoking Status    Every Day    Types: Cigarettes   Smokeless Tobacco    Never    Social History    Substance and Sexual Activity      Alcohol use: No        Alcohol/week: 0.0 standard drinks of alcohol     History   Drug Use No       FAMILY HISTORY  Family History   Problem Relation Age of Onset    Arthritis Mother         Arthritis,Rheumatoid    Cancer Mother         Cancer, lung and uterine cancer     Heart Disease Father         Heart Disease,CAD, CABG, peripheral vascular dise    Thyroid Disease Father         Thyroid Disease, hyperlipidemia    Other - See Comments Father         djd    Asthma Brother         Asthma    Asthma Sister         Asthma    Other - See Comments Sister         Psychiatric illness,Anxiety    Other - See Comments Son         x2 back surgeries    Breast Cancer No family hx of         Cancer-breast       PHYSICAL EXAMINATION  BP (!) 140/81   Pulse 92   Temp 99.6  F (37.6  C) (Tympanic)   Resp 16   Wt 81.2 kg (179 lb)   LMP 01/01/2007 (Approximate)   SpO2 94%   BMI 28.04 kg/m    Wt Readings from Last 2 Encounters:   05/22/25 81.2 kg (179 lb)   05/19/25 79.1 kg (174 lb 6.4 oz)     Physical Exam  Cardiovascular:      Rate and Rhythm: Normal rate.   Pulmonary:      Effort: Pulmonary effort is normal.   Neurological:      General: No focal deficit present.      Mental Status: She is alert and oriented to person, place, and time.   Psychiatric:         Mood and Affect: Mood normal.         Behavior: Behavior normal.   Laboratory and Imaging:     Latest Reference Range & Units 04/28/25 06:20   WBC 4.0 - 11.0 10e3/uL 6.7   Hemoglobin 11.7 - 15.7 g/dL 9.1 (L)   Hematocrit 35.0 - 47.0 % 27.3 (L)   Platelet Count 150 - 450 10e3/uL 259   (L): Data is abnormally low  ASSESSMENT AND PLAN    1.  Acute myeloid leukemia with NPMI mutation:    NGS also showed NRAS and IDH 2 mutation.      Received induction with 7+3 on 3/5/2025.  Her course was complicated 24 episodes of neutropenic fever and epigastric pain and reflux symptoms.    Admit treatment marrow on 3/19/2025 showed complete response with cellularity less than 5%.  Her bone marrow biopsy on 3/31/2011 showed recovery of her bone marrow without any dysplasia.    She received her first cycle of consolidation with HiDAC on 4/3/2025.  She also received Neulasta on 4/7/2025.  Her second cycle was on 5/6/2025 with Neulasta on 5/12/2025.  She then  had an admission to the hospital with enterocolitis and coronavirus.  Treated with cefepime and Flagyl and then transition to Levaquin and Flagyl.  She is recovered now.  Overall doing well.    Neutropenia has resolved.  Labs done on 5/21/2025 showed that her WBC count is 16.4 with a hemoglobin of 7.5 and a platelet count of 38.  Overall she is feeling good.  Denies any significant fatigue.  Will plan to transfuse if hemoglobin less than 7 and platelet count less than 10 or bleeding.  She will continue to get labs thrice a week while she is on her consolidation.  She is scheduled to go back to the [facility] on 6/17/2025 bone marrow and next cycle of HIDAC.    Prophylactic antibiotics: Flagyl will finish on 6/2/2025.  Levaquin, acyclovir and posaconazole she will continue.    Total time spent on the patient on day of encounter was 30 minutes doing chart review, review of test results, interpretation of results, patient visit and documentation.       Samina Harris MD

## 2025-05-22 NOTE — NURSING NOTE
"Oncology Rooming Note    May 22, 2025 12:23 PM   Alejandra Villegas is a 59 year old female who presents for:    Chief Complaint   Patient presents with    Oncology Clinic Visit     1 month follow up     Initial Vitals: BP (!) 140/81   Pulse 92   Temp 99.6  F (37.6  C) (Tympanic)   Resp 16   Wt 81.2 kg (179 lb)   LMP 01/01/2007 (Approximate)   SpO2 94%   BMI 28.04 kg/m   Estimated body mass index is 28.04 kg/m  as calculated from the following:    Height as of 5/18/25: 1.702 m (5' 7\").    Weight as of this encounter: 81.2 kg (179 lb). Body surface area is 1.96 meters squared.  Moderate Pain (6) Comment: Data Unavailable   Patient's last menstrual period was 01/01/2007 (approximate).  Allergies reviewed: Yes  Medications reviewed: Yes    Medications: Medication refills not needed today.  Pharmacy name entered into EPIC:    "[id number] - [place], [place] - [address] [id number] - [place], [place] - [address]    Frailty Screening:   Is the patient here for a new oncology consult visit in cancer care? 2. No    PHQ9:  Did this patient require a PHQ9?: No      Clinical concerns: 1 month follow up-recent admission      Lisha Winkler RN              "

## 2025-05-22 NOTE — Clinical Note
I saw Ms. Villegas today.  She is doing very well.  Recovered from her admission for colitis and coronavirus.  I just wanted to confirm she got her last dose of HiDAC on 5/6/2025 so would her next chemo be 6/3/2025.  She does not have an admission coming up on 6/17/2025.

## 2025-05-23 ENCOUNTER — RESULTS FOLLOW-UP (OUTPATIENT)
Dept: ONCOLOGY | Facility: OTHER | Age: 60
End: 2025-05-23

## 2025-05-23 ENCOUNTER — LAB (OUTPATIENT)
Dept: LAB | Facility: OTHER | Age: 60
End: 2025-05-23
Payer: MEDICARE

## 2025-05-23 ENCOUNTER — HOSPITAL ENCOUNTER (OUTPATIENT)
Facility: OTHER | Age: 60
Discharge: HOME | End: 2025-05-23
Attending: FAMILY MEDICINE | Admitting: FAMILY MEDICINE
Payer: MEDICARE

## 2025-05-23 VITALS
SYSTOLIC BLOOD PRESSURE: 127 MMHG | DIASTOLIC BLOOD PRESSURE: 78 MMHG | RESPIRATION RATE: 16 BRPM | HEART RATE: 82 BPM | OXYGEN SATURATION: 98 % | TEMPERATURE: 99 F

## 2025-05-23 DIAGNOSIS — C92.00 ACUTE MYELOID LEUKEMIA NOT HAVING ACHIEVED REMISSION (H): ICD-10-CM

## 2025-05-23 DIAGNOSIS — D53.9 MACROCYTIC ANEMIA: Primary | ICD-10-CM

## 2025-05-23 LAB
ALBUMIN SERPL BCG-MCNC: 3.5 G/DL (ref 3.5–5.2)
ALP SERPL-CCNC: 620 U/L (ref 40–150)
ALT SERPL W P-5'-P-CCNC: 67 U/L (ref 0–50)
ANION GAP SERPL CALCULATED.3IONS-SCNC: 12 MMOL/L (ref 7–15)
AST SERPL W P-5'-P-CCNC: 116 U/L (ref 0–45)
BACTERIA SPEC CULT: NO GROWTH
BACTERIA SPEC CULT: NO GROWTH
BASOPHILS # BLD MANUAL: 0.3 10E3/UL (ref 0–0.2)
BASOPHILS NFR BLD MANUAL: 3 %
BILIRUB SERPL-MCNC: 0.3 MG/DL
BLD PROD TYP BPU: NORMAL
BLOOD COMPONENT TYPE: NORMAL
BUN SERPL-MCNC: 6.6 MG/DL (ref 6–20)
CALCIUM SERPL-MCNC: 8.6 MG/DL (ref 8.8–10.4)
CHLORIDE SERPL-SCNC: 102 MMOL/L (ref 98–107)
CODING SYSTEM: NORMAL
CREAT SERPL-MCNC: 0.6 MG/DL (ref 0.51–0.95)
CROSSMATCH: NORMAL
EGFRCR SERPLBLD CKD-EPI 2021: >90 ML/MIN/1.73M2
EOSINOPHIL # BLD MANUAL: 0.9 10E3/UL (ref 0–0.7)
EOSINOPHIL NFR BLD MANUAL: 8 %
ERYTHROCYTE [DISTWIDTH] IN BLOOD BY AUTOMATED COUNT: 17.3 % (ref 10–15)
GLUCOSE SERPL-MCNC: 106 MG/DL (ref 70–99)
HCO3 SERPL-SCNC: 26 MMOL/L (ref 22–29)
HCT VFR BLD AUTO: 19.8 % (ref 35–47)
HGB BLD-MCNC: 6.8 G/DL (ref 11.7–15.7)
ISSUE DATE AND TIME: NORMAL
LYMPHOCYTES # BLD MANUAL: 0.3 10E3/UL (ref 0.8–5.3)
LYMPHOCYTES NFR BLD MANUAL: 3 %
MCH RBC QN AUTO: 29.6 PG (ref 26.5–33)
MCHC RBC AUTO-ENTMCNC: 34.3 G/DL (ref 31.5–36.5)
MCV RBC AUTO: 86 FL (ref 78–100)
MONOCYTES # BLD MANUAL: 3.3 10E3/UL (ref 0–1.3)
MONOCYTES NFR BLD MANUAL: 31 %
NEUTROPHILS # BLD MANUAL: 5.9 10E3/UL (ref 1.6–8.3)
NEUTROPHILS NFR BLD MANUAL: 55 %
PLATELET # BLD AUTO: 86 10E3/UL (ref 150–450)
POTASSIUM SERPL-SCNC: 3.4 MMOL/L (ref 3.4–5.3)
PROT SERPL-MCNC: 6.4 G/DL (ref 6.4–8.3)
RBC # BLD AUTO: 2.3 10E6/UL (ref 3.8–5.2)
SODIUM SERPL-SCNC: 140 MMOL/L (ref 135–145)
UNIT ABO/RH: NORMAL
UNIT NUMBER: NORMAL
UNIT STATUS: NORMAL
UNIT TYPE ISBT: 6200
WBC # BLD AUTO: 10.8 10E3/UL (ref 4–11)

## 2025-05-23 PROCEDURE — 86923 COMPATIBILITY TEST ELECTRIC: CPT | Performed by: NURSE PRACTITIONER

## 2025-05-23 PROCEDURE — 36415 COLL VENOUS BLD VENIPUNCTURE: CPT | Mod: ZL

## 2025-05-23 PROCEDURE — P9016 RBC LEUKOCYTES REDUCED: HCPCS | Performed by: NURSE PRACTITIONER

## 2025-05-23 PROCEDURE — 36430 TRANSFUSION BLD/BLD COMPNT: CPT

## 2025-05-23 PROCEDURE — 85007 BL SMEAR W/DIFF WBC COUNT: CPT | Mod: ZL

## 2025-05-23 PROCEDURE — 85041 AUTOMATED RBC COUNT: CPT | Mod: ZL

## 2025-05-23 PROCEDURE — 258N000003 HC RX IP 258 OP 636: Performed by: NURSE PRACTITIONER

## 2025-05-23 PROCEDURE — 86901 BLOOD TYPING SEROLOGIC RH(D): CPT

## 2025-05-23 PROCEDURE — 82247 BILIRUBIN TOTAL: CPT | Mod: ZL

## 2025-05-23 RX ORDER — DIPHENHYDRAMINE HYDROCHLORIDE 50 MG/ML
50 INJECTION, SOLUTION INTRAMUSCULAR; INTRAVENOUS
Start: 2025-05-23

## 2025-05-23 RX ORDER — HEPARIN SODIUM (PORCINE) LOCK FLUSH IV SOLN 100 UNIT/ML 100 UNIT/ML
5 SOLUTION INTRAVENOUS
OUTPATIENT
Start: 2025-05-23

## 2025-05-23 RX ORDER — HEPARIN SODIUM,PORCINE 10 UNIT/ML
5-20 VIAL (ML) INTRAVENOUS DAILY PRN
OUTPATIENT
Start: 2025-05-23

## 2025-05-23 RX ORDER — EPINEPHRINE 1 MG/ML
0.3 INJECTION, SOLUTION, CONCENTRATE INTRAVENOUS EVERY 5 MIN PRN
OUTPATIENT
Start: 2025-05-23

## 2025-05-23 RX ADMIN — SODIUM CHLORIDE 250 ML: 0.9 INJECTION, SOLUTION INTRAVENOUS at 17:19

## 2025-05-23 ASSESSMENT — ACTIVITIES OF DAILY LIVING (ADL)
ADLS_ACUITY_SCORE: 54

## 2025-05-27 ENCOUNTER — PATIENT OUTREACH (OUTPATIENT)
Dept: FAMILY MEDICINE | Facility: OTHER | Age: 60
End: 2025-05-27
Payer: MEDICARE

## 2025-05-27 NOTE — TELEPHONE ENCOUNTER
Patient admitted for infusion services. No TCM call required per policy.  Kathleen Bishop RN on 5/27/2025 at 8:15 AM

## 2025-05-28 ENCOUNTER — LAB (OUTPATIENT)
Dept: LAB | Facility: OTHER | Age: 60
End: 2025-05-28
Payer: MEDICARE

## 2025-05-28 ENCOUNTER — RESULTS FOLLOW-UP (OUTPATIENT)
Dept: ONCOLOGY | Facility: OTHER | Age: 60
End: 2025-05-28

## 2025-05-28 DIAGNOSIS — C92.00 ACUTE MYELOID LEUKEMIA NOT HAVING ACHIEVED REMISSION (H): ICD-10-CM

## 2025-05-28 LAB
ALBUMIN SERPL BCG-MCNC: 3.4 G/DL (ref 3.5–5.2)
ALP SERPL-CCNC: 247 U/L (ref 40–150)
ALT SERPL W P-5'-P-CCNC: 25 U/L (ref 0–50)
ANION GAP SERPL CALCULATED.3IONS-SCNC: 13 MMOL/L (ref 7–15)
AST SERPL W P-5'-P-CCNC: 21 U/L (ref 0–45)
BASOPHILS # BLD AUTO: 0 10E3/UL (ref 0–0.2)
BASOPHILS NFR BLD AUTO: 1 %
BILIRUB SERPL-MCNC: 0.3 MG/DL
BUN SERPL-MCNC: 5.7 MG/DL (ref 6–20)
CALCIUM SERPL-MCNC: 8.7 MG/DL (ref 8.8–10.4)
CHLORIDE SERPL-SCNC: 103 MMOL/L (ref 98–107)
CREAT SERPL-MCNC: 0.53 MG/DL (ref 0.51–0.95)
EGFRCR SERPLBLD CKD-EPI 2021: >90 ML/MIN/1.73M2
EOSINOPHIL # BLD AUTO: 0 10E3/UL (ref 0–0.7)
EOSINOPHIL NFR BLD AUTO: 0 %
ERYTHROCYTE [DISTWIDTH] IN BLOOD BY AUTOMATED COUNT: 17.9 % (ref 10–15)
GLUCOSE SERPL-MCNC: 108 MG/DL (ref 70–99)
HCO3 SERPL-SCNC: 25 MMOL/L (ref 22–29)
HCT VFR BLD AUTO: 24.8 % (ref 35–47)
HGB BLD-MCNC: 8.1 G/DL (ref 11.7–15.7)
IMM GRANULOCYTES # BLD: 0.1 10E3/UL
IMM GRANULOCYTES NFR BLD: 1 %
LYMPHOCYTES # BLD AUTO: 0.6 10E3/UL (ref 0.8–5.3)
LYMPHOCYTES NFR BLD AUTO: 8 %
MCH RBC QN AUTO: 29.1 PG (ref 26.5–33)
MCHC RBC AUTO-ENTMCNC: 32.7 G/DL (ref 31.5–36.5)
MCV RBC AUTO: 89 FL (ref 78–100)
MONOCYTES # BLD AUTO: 2.5 10E3/UL (ref 0–1.3)
MONOCYTES NFR BLD AUTO: 30 %
NEUTROPHILS # BLD AUTO: 5 10E3/UL (ref 1.6–8.3)
NEUTROPHILS NFR BLD AUTO: 61 %
NRBC # BLD AUTO: 0.1 10E3/UL
NRBC BLD AUTO-RTO: 2 /100
PLAT MORPH BLD: NORMAL
PLATELET # BLD AUTO: 284 10E3/UL (ref 150–450)
POTASSIUM SERPL-SCNC: 3.5 MMOL/L (ref 3.4–5.3)
PROT SERPL-MCNC: 6.4 G/DL (ref 6.4–8.3)
RBC # BLD AUTO: 2.78 10E6/UL (ref 3.8–5.2)
RBC MORPH BLD: NORMAL
SODIUM SERPL-SCNC: 141 MMOL/L (ref 135–145)
WBC # BLD AUTO: 8.3 10E3/UL (ref 4–11)

## 2025-05-28 PROCEDURE — 85014 HEMATOCRIT: CPT | Mod: ZL

## 2025-05-28 PROCEDURE — 36415 COLL VENOUS BLD VENIPUNCTURE: CPT | Mod: ZL

## 2025-05-28 PROCEDURE — 82040 ASSAY OF SERUM ALBUMIN: CPT | Mod: ZL

## 2025-05-30 ENCOUNTER — RESULTS FOLLOW-UP (OUTPATIENT)
Dept: ONCOLOGY | Facility: OTHER | Age: 60
End: 2025-05-30

## 2025-05-30 ENCOUNTER — LAB (OUTPATIENT)
Dept: LAB | Facility: OTHER | Age: 60
End: 2025-05-30
Payer: MEDICARE

## 2025-05-30 DIAGNOSIS — C92.00 ACUTE MYELOID LEUKEMIA NOT HAVING ACHIEVED REMISSION (H): ICD-10-CM

## 2025-05-30 LAB
ALBUMIN SERPL BCG-MCNC: 3.6 G/DL (ref 3.5–5.2)
ALP SERPL-CCNC: 208 U/L (ref 40–150)
ALT SERPL W P-5'-P-CCNC: 19 U/L (ref 0–50)
ANION GAP SERPL CALCULATED.3IONS-SCNC: 13 MMOL/L (ref 7–15)
AST SERPL W P-5'-P-CCNC: 22 U/L (ref 0–45)
BASOPHILS # BLD AUTO: 0.1 10E3/UL (ref 0–0.2)
BASOPHILS NFR BLD AUTO: 1 %
BILIRUB SERPL-MCNC: 0.3 MG/DL
BUN SERPL-MCNC: 7.4 MG/DL (ref 6–20)
CALCIUM SERPL-MCNC: 9.3 MG/DL (ref 8.8–10.4)
CHLORIDE SERPL-SCNC: 101 MMOL/L (ref 98–107)
CREAT SERPL-MCNC: 0.57 MG/DL (ref 0.51–0.95)
EGFRCR SERPLBLD CKD-EPI 2021: >90 ML/MIN/1.73M2
EOSINOPHIL # BLD AUTO: 0 10E3/UL (ref 0–0.7)
EOSINOPHIL NFR BLD AUTO: 0 %
ERYTHROCYTE [DISTWIDTH] IN BLOOD BY AUTOMATED COUNT: 19.1 % (ref 10–15)
GLUCOSE SERPL-MCNC: 108 MG/DL (ref 70–99)
HCO3 SERPL-SCNC: 26 MMOL/L (ref 22–29)
HCT VFR BLD AUTO: 27.1 % (ref 35–47)
HGB BLD-MCNC: 8.9 G/DL (ref 11.7–15.7)
IMM GRANULOCYTES # BLD: 0 10E3/UL
IMM GRANULOCYTES NFR BLD: 1 %
LYMPHOCYTES # BLD AUTO: 0.9 10E3/UL (ref 0.8–5.3)
LYMPHOCYTES NFR BLD AUTO: 13 %
MCH RBC QN AUTO: 29.7 PG (ref 26.5–33)
MCHC RBC AUTO-ENTMCNC: 32.8 G/DL (ref 31.5–36.5)
MCV RBC AUTO: 90 FL (ref 78–100)
MONOCYTES # BLD AUTO: 2.2 10E3/UL (ref 0–1.3)
MONOCYTES NFR BLD AUTO: 31 %
NEUTROPHILS # BLD AUTO: 3.9 10E3/UL (ref 1.6–8.3)
NEUTROPHILS NFR BLD AUTO: 55 %
NRBC # BLD AUTO: 0.2 10E3/UL
NRBC BLD AUTO-RTO: 2 /100
PLATELET # BLD AUTO: 383 10E3/UL (ref 150–450)
POTASSIUM SERPL-SCNC: 3.3 MMOL/L (ref 3.4–5.3)
PROT SERPL-MCNC: 6.9 G/DL (ref 6.4–8.3)
RBC # BLD AUTO: 3 10E6/UL (ref 3.8–5.2)
SODIUM SERPL-SCNC: 140 MMOL/L (ref 135–145)
WBC # BLD AUTO: 7.1 10E3/UL (ref 4–11)

## 2025-05-30 PROCEDURE — 82247 BILIRUBIN TOTAL: CPT | Mod: ZL

## 2025-05-30 PROCEDURE — 85004 AUTOMATED DIFF WBC COUNT: CPT | Mod: ZL

## 2025-05-30 PROCEDURE — 36415 COLL VENOUS BLD VENIPUNCTURE: CPT | Mod: ZL

## 2025-06-02 ENCOUNTER — LAB (OUTPATIENT)
Dept: LAB | Facility: OTHER | Age: 60
End: 2025-06-02
Attending: INTERNAL MEDICINE
Payer: MEDICARE

## 2025-06-02 DIAGNOSIS — C92.00 ACUTE MYELOID LEUKEMIA NOT HAVING ACHIEVED REMISSION (H): ICD-10-CM

## 2025-06-02 LAB
ALBUMIN SERPL BCG-MCNC: 3.6 G/DL (ref 3.5–5.2)
ALP SERPL-CCNC: 155 U/L (ref 40–150)
ALT SERPL W P-5'-P-CCNC: 14 U/L (ref 0–50)
ANION GAP SERPL CALCULATED.3IONS-SCNC: 14 MMOL/L (ref 7–15)
AST SERPL W P-5'-P-CCNC: 19 U/L (ref 0–45)
BASOPHILS # BLD AUTO: 0.1 10E3/UL (ref 0–0.2)
BASOPHILS NFR BLD AUTO: 1 %
BILIRUB SERPL-MCNC: 0.3 MG/DL
BUN SERPL-MCNC: 5.6 MG/DL (ref 6–20)
CALCIUM SERPL-MCNC: 9.2 MG/DL (ref 8.8–10.4)
CHLORIDE SERPL-SCNC: 102 MMOL/L (ref 98–107)
CREAT SERPL-MCNC: 0.54 MG/DL (ref 0.51–0.95)
EGFRCR SERPLBLD CKD-EPI 2021: >90 ML/MIN/1.73M2
EOSINOPHIL # BLD AUTO: 0 10E3/UL (ref 0–0.7)
EOSINOPHIL NFR BLD AUTO: 1 %
ERYTHROCYTE [DISTWIDTH] IN BLOOD BY AUTOMATED COUNT: 21.8 % (ref 10–15)
GLUCOSE SERPL-MCNC: 186 MG/DL (ref 70–99)
HCO3 SERPL-SCNC: 24 MMOL/L (ref 22–29)
HCT VFR BLD AUTO: 28 % (ref 35–47)
HGB BLD-MCNC: 8.9 G/DL (ref 11.7–15.7)
IMM GRANULOCYTES # BLD: 0 10E3/UL
IMM GRANULOCYTES NFR BLD: 0 %
LYMPHOCYTES # BLD AUTO: 0.7 10E3/UL (ref 0.8–5.3)
LYMPHOCYTES NFR BLD AUTO: 11 %
MCH RBC QN AUTO: 30.1 PG (ref 26.5–33)
MCHC RBC AUTO-ENTMCNC: 31.8 G/DL (ref 31.5–36.5)
MCV RBC AUTO: 95 FL (ref 78–100)
MONOCYTES # BLD AUTO: 1.6 10E3/UL (ref 0–1.3)
MONOCYTES NFR BLD AUTO: 22 %
NEUTROPHILS # BLD AUTO: 4.6 10E3/UL (ref 1.6–8.3)
NEUTROPHILS NFR BLD AUTO: 65 %
NRBC # BLD AUTO: 0.1 10E3/UL
NRBC BLD AUTO-RTO: 1 /100
PLATELET # BLD AUTO: 407 10E3/UL (ref 150–450)
POTASSIUM SERPL-SCNC: 3.1 MMOL/L (ref 3.4–5.3)
PROT SERPL-MCNC: 6.6 G/DL (ref 6.4–8.3)
RBC # BLD AUTO: 2.96 10E6/UL (ref 3.8–5.2)
SODIUM SERPL-SCNC: 140 MMOL/L (ref 135–145)
WBC # BLD AUTO: 7 10E3/UL (ref 4–11)

## 2025-06-02 PROCEDURE — 82040 ASSAY OF SERUM ALBUMIN: CPT | Mod: ZL

## 2025-06-02 PROCEDURE — 85025 COMPLETE CBC W/AUTO DIFF WBC: CPT | Mod: ZL

## 2025-06-02 PROCEDURE — 36415 COLL VENOUS BLD VENIPUNCTURE: CPT | Mod: ZL

## 2025-06-02 PROCEDURE — 82374 ASSAY BLOOD CARBON DIOXIDE: CPT | Mod: ZL

## 2025-06-03 NOTE — ED NOTES
Chart opened due to received incomplete paperwork from [place] related to transfer 5/18/25.    Lianna SEPULVEDA RN 6/3/2025 at 1450

## 2025-06-04 ENCOUNTER — OFFICE VISIT (OUTPATIENT)
Dept: FAMILY MEDICINE | Facility: OTHER | Age: 60
End: 2025-06-04
Payer: MEDICARE

## 2025-06-04 ENCOUNTER — LAB (OUTPATIENT)
Dept: LAB | Facility: OTHER | Age: 60
End: 2025-06-04
Payer: MEDICARE

## 2025-06-04 VITALS
WEIGHT: 179.2 LBS | TEMPERATURE: 97 F | OXYGEN SATURATION: 96 % | DIASTOLIC BLOOD PRESSURE: 80 MMHG | SYSTOLIC BLOOD PRESSURE: 130 MMHG | BODY MASS INDEX: 28.07 KG/M2 | HEART RATE: 79 BPM

## 2025-06-04 DIAGNOSIS — C92.00 ACUTE MYELOID LEUKEMIA NOT HAVING ACHIEVED REMISSION (H): ICD-10-CM

## 2025-06-04 DIAGNOSIS — H60.391 INFECTIVE OTITIS EXTERNA, RIGHT: ICD-10-CM

## 2025-06-04 DIAGNOSIS — H92.01 RIGHT EAR PAIN: Primary | ICD-10-CM

## 2025-06-04 LAB
ALBUMIN SERPL BCG-MCNC: 3.7 G/DL (ref 3.5–5.2)
ALP SERPL-CCNC: 154 U/L (ref 40–150)
ALT SERPL W P-5'-P-CCNC: 13 U/L (ref 0–50)
ANION GAP SERPL CALCULATED.3IONS-SCNC: 13 MMOL/L (ref 7–15)
AST SERPL W P-5'-P-CCNC: 21 U/L (ref 0–45)
BASOPHILS # BLD AUTO: 0.1 10E3/UL (ref 0–0.2)
BASOPHILS NFR BLD AUTO: 2 %
BILIRUB SERPL-MCNC: 0.3 MG/DL
BUN SERPL-MCNC: 7.5 MG/DL (ref 6–20)
CALCIUM SERPL-MCNC: 9.4 MG/DL (ref 8.8–10.4)
CHLORIDE SERPL-SCNC: 102 MMOL/L (ref 98–107)
CREAT SERPL-MCNC: 0.6 MG/DL (ref 0.51–0.95)
EGFRCR SERPLBLD CKD-EPI 2021: >90 ML/MIN/1.73M2
EOSINOPHIL # BLD AUTO: 0.1 10E3/UL (ref 0–0.7)
EOSINOPHIL NFR BLD AUTO: 2 %
ERYTHROCYTE [DISTWIDTH] IN BLOOD BY AUTOMATED COUNT: 23.5 % (ref 10–15)
GLUCOSE SERPL-MCNC: 105 MG/DL (ref 70–99)
HCO3 SERPL-SCNC: 25 MMOL/L (ref 22–29)
HCT VFR BLD AUTO: 29.8 % (ref 35–47)
HGB BLD-MCNC: 9.6 G/DL (ref 11.7–15.7)
IMM GRANULOCYTES # BLD: 0 10E3/UL
IMM GRANULOCYTES NFR BLD: 1 %
LYMPHOCYTES # BLD AUTO: 0.8 10E3/UL (ref 0.8–5.3)
LYMPHOCYTES NFR BLD AUTO: 12 %
MCH RBC QN AUTO: 30.8 PG (ref 26.5–33)
MCHC RBC AUTO-ENTMCNC: 32.2 G/DL (ref 31.5–36.5)
MCV RBC AUTO: 96 FL (ref 78–100)
MONOCYTES # BLD AUTO: 2 10E3/UL (ref 0–1.3)
MONOCYTES NFR BLD AUTO: 31 %
NEUTROPHILS # BLD AUTO: 3.5 10E3/UL (ref 1.6–8.3)
NEUTROPHILS NFR BLD AUTO: 53 %
NRBC # BLD AUTO: 0 10E3/UL
NRBC BLD AUTO-RTO: 1 /100
PLAT MORPH BLD: NORMAL
PLATELET # BLD AUTO: 426 10E3/UL (ref 150–450)
POTASSIUM SERPL-SCNC: 3.9 MMOL/L (ref 3.4–5.3)
PROT SERPL-MCNC: 6.8 G/DL (ref 6.4–8.3)
RBC # BLD AUTO: 3.12 10E6/UL (ref 3.8–5.2)
RBC MORPH BLD: NORMAL
SODIUM SERPL-SCNC: 140 MMOL/L (ref 135–145)
WBC # BLD AUTO: 6.5 10E3/UL (ref 4–11)

## 2025-06-04 PROCEDURE — 85041 AUTOMATED RBC COUNT: CPT | Mod: ZL

## 2025-06-04 PROCEDURE — 85049 AUTOMATED PLATELET COUNT: CPT | Mod: ZL

## 2025-06-04 PROCEDURE — 36415 COLL VENOUS BLD VENIPUNCTURE: CPT | Mod: ZL

## 2025-06-04 PROCEDURE — G0463 HOSPITAL OUTPT CLINIC VISIT: HCPCS

## 2025-06-04 PROCEDURE — 80053 COMPREHEN METABOLIC PANEL: CPT | Mod: ZL

## 2025-06-04 RX ORDER — CIPROFLOXACIN AND DEXAMETHASONE 3; 1 MG/ML; MG/ML
4 SUSPENSION/ DROPS AURICULAR (OTIC) 2 TIMES DAILY
Qty: 7.5 ML | Refills: 0 | Status: SHIPPED | OUTPATIENT
Start: 2025-06-04 | End: 2025-06-09

## 2025-06-04 ASSESSMENT — ENCOUNTER SYMPTOMS
CARDIOVASCULAR NEGATIVE: 1
HEMATOLOGIC/LYMPHATIC NEGATIVE: 1
CONSTITUTIONAL NEGATIVE: 1
NEUROLOGICAL NEGATIVE: 1
GASTROINTESTINAL NEGATIVE: 1
MUSCULOSKELETAL NEGATIVE: 1
PSYCHIATRIC NEGATIVE: 1

## 2025-06-04 ASSESSMENT — PAIN SCALES - GENERAL: PAINLEVEL_OUTOF10: MODERATE PAIN (4)

## 2025-06-04 NOTE — NURSING NOTE
Patient presents with right ear pain.  Courtney Mora LPN.........................6/4/2025  1:21 PM

## 2025-06-04 NOTE — PROGRESS NOTES
Alejandra Villegas  1965    ASSESSMENT/PLAN      Presents to [facility] with right ear pain worsening over the last couple of days.  Patient does have history of ear infections frequently.  Patient is currently on treatment for acute myeloid leukemia and is on consolidation chemotherapy.  Patient is considered immune compromised.  No otitis media noted on exam, tympanic membranes clear without sign of infection.  Ear canal on right side has mild erythema and will treat for otitis externa.  Patient's vitals are stable and she appears nontoxic.        1. Right ear pain (Primary)    - No otitis media     2. Infective otitis externa, right    - ciprofloxacin-dexAMETHasone (CIPRODEX) 0.3-0.1 % otic suspension; Place 4 drops into the right ear 2 times daily for 5 days.  Dispense: 7.5 mL; Refill: 0    - May use over-the-counter Tylenol or ibuprofen PRN  - Follow up as needed for new or worsening symptoms        *A shared decision making model was used.   *Patient and/or associated parties understood and were agreeable to treatment plan.   *Plan and all results were discussed.   *Explanation of diagnosis, treatment options and risk and benefits of medications reviewed with patient.    *Time was taken to answer all questions.   *Red flags symptoms were discussed and patient was advised when they should return for reevaluation or for prompt emergency evaluation.   *Patient was given verbal and written instructions on plan of care. Instructions were printed or are available on Mychart on electronic AVS.   *We discussed potential side effects of any prescribed or recommended therapies, as well as expectations for response to treatments.  *Patient discharged in stable condition    Jyothi Villalobos CNP  [facility]    SUBJECTIVE  CHIEF COMPLAINT/ REASON FOR VISIT  Patient presents with:  Ent Problem     HISTORY OF PRESENT ILLNESS  Alejandra Villegas is a pleasant 59 year old female presents to [facility]  clinic today with right ear pain worsening over the last couple of days.  Patient does have history of ear infections frequently.  Patient is currently on treatment for acute myeloid leukemia and is on consolidation chemotherapy.  Patient is considered immune compromised.           I have reviewed the nursing notes.  I have reviewed allergies, medication list, problem list, and past medical history.    REVIEW OF SYSTEMS  Review of Systems   Constitutional: Negative.    HENT:  Positive for ear pain.    Cardiovascular: Negative.    Gastrointestinal: Negative.    Genitourinary: Negative.    Musculoskeletal: Negative.    Skin: Negative.    Neurological: Negative.    Hematological: Negative.    Psychiatric/Behavioral: Negative.     All other systems reviewed and are negative.       VITAL SIGNS  Vitals:    06/04/25 1322   BP: 130/80   BP Location: Right arm   Pulse: 79   Temp: 97  F (36.1  C)   TempSrc: Tympanic   SpO2: 96%   Weight: 81.3 kg (179 lb 3.2 oz)      Body mass index is 28.07 kg/m .      OBJECTIVE  PHYSICAL EXAM  Physical Exam  Vitals and nursing note reviewed.   Constitutional:       Appearance: Normal appearance.   HENT:      Head: Normocephalic.      Right Ear: Swelling and tenderness present.      Nose: Nose normal.      Mouth/Throat:      Mouth: Mucous membranes are moist.   Cardiovascular:      Rate and Rhythm: Normal rate.      Pulses: Normal pulses.   Pulmonary:      Effort: Pulmonary effort is normal.   Abdominal:      Palpations: Abdomen is soft.   Musculoskeletal:         General: Normal range of motion.      Cervical back: Normal range of motion.   Skin:     General: Skin is warm and dry.      Capillary Refill: Capillary refill takes less than 2 seconds.   Neurological:      General: No focal deficit present.      Mental Status: She is alert.            DIAGNOSTICS  Results for orders placed or performed in visit on 06/04/25   CBC with Platelets & Differential     Status: None (In process)     Narrative    The following orders were created for panel order CBC with Platelets & Differential.  Procedure                               Abnormality         Status                     ---------                               -----------         ------                     CBC with platelets and ...[[id number]]                      In process                 RBC and Platelet Morpho...[[id number]]                      In process                   Please view results for these tests on the individual orders.

## 2025-06-06 ENCOUNTER — APPOINTMENT (OUTPATIENT)
Dept: LAB | Facility: CLINIC | Age: 60
End: 2025-06-06
Attending: STUDENT IN AN ORGANIZED HEALTH CARE EDUCATION/TRAINING PROGRAM
Payer: MEDICARE

## 2025-06-06 ENCOUNTER — RESULTS FOLLOW-UP (OUTPATIENT)
Dept: ONCOLOGY | Facility: OTHER | Age: 60
End: 2025-06-06

## 2025-06-06 PROCEDURE — 81310 NPM1 GENE: CPT | Performed by: STUDENT IN AN ORGANIZED HEALTH CARE EDUCATION/TRAINING PROGRAM

## 2025-06-06 PROCEDURE — 81121 IDH2 COMMON VARIANTS: CPT | Performed by: STUDENT IN AN ORGANIZED HEALTH CARE EDUCATION/TRAINING PROGRAM

## 2025-06-06 PROCEDURE — 88291 CYTO/MOLECULAR REPORT: CPT | Mod: XE | Performed by: MEDICAL GENETICS

## 2025-06-06 PROCEDURE — 81311 NRAS GENE VARIANTS EXON 2&3: CPT | Performed by: STUDENT IN AN ORGANIZED HEALTH CARE EDUCATION/TRAINING PROGRAM

## 2025-06-06 PROCEDURE — 88237 TISSUE CULTURE BONE MARROW: CPT | Performed by: STUDENT IN AN ORGANIZED HEALTH CARE EDUCATION/TRAINING PROGRAM

## 2025-06-06 PROCEDURE — 88271 CYTOGENETICS DNA PROBE: CPT | Performed by: STUDENT IN AN ORGANIZED HEALTH CARE EDUCATION/TRAINING PROGRAM

## 2025-06-06 PROCEDURE — G0452 MOLECULAR PATHOLOGY INTERPR: HCPCS | Mod: 26 | Performed by: STUDENT IN AN ORGANIZED HEALTH CARE EDUCATION/TRAINING PROGRAM

## 2025-06-09 ENCOUNTER — LAB (OUTPATIENT)
Dept: LAB | Facility: OTHER | Age: 60
End: 2025-06-09
Payer: MEDICARE

## 2025-06-09 ENCOUNTER — TELEPHONE (OUTPATIENT)
Dept: FAMILY MEDICINE | Facility: OTHER | Age: 60
End: 2025-06-09

## 2025-06-09 DIAGNOSIS — C92.00 ACUTE MYELOID LEUKEMIA NOT HAVING ACHIEVED REMISSION (H): ICD-10-CM

## 2025-06-09 LAB
ALBUMIN SERPL BCG-MCNC: 3.9 G/DL (ref 3.5–5.2)
ALP SERPL-CCNC: 133 U/L (ref 40–150)
ALT SERPL W P-5'-P-CCNC: 13 U/L (ref 0–50)
ANION GAP SERPL CALCULATED.3IONS-SCNC: 14 MMOL/L (ref 7–15)
AST SERPL W P-5'-P-CCNC: 22 U/L (ref 0–45)
BASOPHILS # BLD AUTO: 0.2 10E3/UL (ref 0–0.2)
BASOPHILS NFR BLD AUTO: 2 %
BILIRUB SERPL-MCNC: 0.4 MG/DL
BUN SERPL-MCNC: 6.4 MG/DL (ref 6–20)
CALCIUM SERPL-MCNC: 9.4 MG/DL (ref 8.8–10.4)
CHLORIDE SERPL-SCNC: 101 MMOL/L (ref 98–107)
CREAT SERPL-MCNC: 0.75 MG/DL (ref 0.51–0.95)
EGFRCR SERPLBLD CKD-EPI 2021: >90 ML/MIN/1.73M2
EOSINOPHIL # BLD AUTO: 0.2 10E3/UL (ref 0–0.7)
EOSINOPHIL NFR BLD AUTO: 2 %
ERYTHROCYTE [DISTWIDTH] IN BLOOD BY AUTOMATED COUNT: 23.5 % (ref 10–15)
GLUCOSE SERPL-MCNC: 126 MG/DL (ref 70–99)
HCO3 SERPL-SCNC: 25 MMOL/L (ref 22–29)
HCT VFR BLD AUTO: 36.8 % (ref 35–47)
HGB BLD-MCNC: 11.6 G/DL (ref 11.7–15.7)
IMM GRANULOCYTES # BLD: 0.1 10E3/UL
IMM GRANULOCYTES NFR BLD: 1 %
LYMPHOCYTES # BLD AUTO: 0.8 10E3/UL (ref 0.8–5.3)
LYMPHOCYTES NFR BLD AUTO: 9 %
MCH RBC QN AUTO: 30.5 PG (ref 26.5–33)
MCHC RBC AUTO-ENTMCNC: 31.5 G/DL (ref 31.5–36.5)
MCV RBC AUTO: 97 FL (ref 78–100)
MONOCYTES # BLD AUTO: 2.4 10E3/UL (ref 0–1.3)
MONOCYTES NFR BLD AUTO: 29 %
NEUTROPHILS # BLD AUTO: 4.8 10E3/UL (ref 1.6–8.3)
NEUTROPHILS NFR BLD AUTO: 57 %
NRBC # BLD AUTO: 0 10E3/UL
NRBC BLD AUTO-RTO: 0 /100
PLAT MORPH BLD: NORMAL
PLATELET # BLD AUTO: 539 10E3/UL (ref 150–450)
POTASSIUM SERPL-SCNC: 3.5 MMOL/L (ref 3.4–5.3)
PROT SERPL-MCNC: 7.4 G/DL (ref 6.4–8.3)
RBC # BLD AUTO: 3.8 10E6/UL (ref 3.8–5.2)
RBC MORPH BLD: NORMAL
SODIUM SERPL-SCNC: 140 MMOL/L (ref 135–145)
WBC # BLD AUTO: 8.3 10E3/UL (ref 4–11)

## 2025-06-09 PROCEDURE — 84155 ASSAY OF PROTEIN SERUM: CPT | Mod: ZL

## 2025-06-09 PROCEDURE — 85004 AUTOMATED DIFF WBC COUNT: CPT | Mod: ZL

## 2025-06-09 PROCEDURE — 36415 COLL VENOUS BLD VENIPUNCTURE: CPT | Mod: ZL

## 2025-06-09 NOTE — TELEPHONE ENCOUNTER
Patient requested a call from  to discuss why her blood pressure medication was taken off her meds list. She has been having issues with it going high at times. She states she will be using [facility] as her primary care provider. Please call.    Allie He on 6/9/2025 at 2:36 PM

## 2025-06-09 NOTE — TELEPHONE ENCOUNTER
Writer returned patient call in regards to provider discontinuing her Lisinopril for blood pressure.     Patient was informed that lisinopril was discontinued prior to seeing Dr. Lezama, during an inpatient admission for Oncology treatment.      Patient reports blood pressures have been elevated, and would like to know what she can take for management.  She also would like Dr. Lezama to be listed as her PCP.      Routed to provider for instruction on Blood Pressure management.      Trina Meadows LPN Ext. [phone] on 6/9/2025 at 3:00 PM

## 2025-06-09 NOTE — TELEPHONE ENCOUNTER
Writer spoke with patient about making appointment to address blood pressure.  Patient will be admitted to [facility] for chemo therapy starting 6/11 of for 4 days.      Patient agreed to be seen on 06/16 at 1545.      Writer contacted scheduling and secured appointment time.      Trina Meadows LPN Ext. [phone] on 6/9/2025 at 4:11 PM

## 2025-06-10 NOTE — H&P
[facility], [place]    History & Physical  Hematology / Oncology     Date of Admission:  6/11/25  Date of Service (when I saw the patient):  06/11/2025     Assessment & Plan   Alejandra Villegas is a 59 year old female with a past medical history significant for COPD, migraines, rheumatoid arthritis, aortic stenosis, anxiety, and recent diagnosis of AML who is admitted for scheduled consolidation chemotherapy with HiDAC (C3D1=6/11/25).     HEME  # AML with NPM1 mutation  Had been seen by PCP Dec 2024 w/ progressive fatigue and nausea where she was found leukopenic WBC  2.4 and neutropenic w/ ANC 0.3. Hgb 12. Was referred to local hematology/oncology clinic for further evaluation and seen by Dr. Samina Harris. BMBx 2/10/25 done at OSH showed hypercellular marrow w/ trilineage hematopoiesis w/ dyspoiesis with mildly elevated blasts of 4% on morphology. NGS w/ NPM1, IDH2, and NRAS mutations. She was admitted to [facility] 2/26/25 for further workup and management. Slides were re-reviewed by [facility] Hematopathology, who noted hypercellular marrow with 8% blasts by morphology. Despite relatively low blast percentage, findings were felt most consistent with a diagnosis AML with NPM1 mutation by WHO 2022 (which does not require a specific blast threshold). Following interdepartmental discussions and review of the available literature, patient was started on intensive induction with 7+3 (Day 1=3/5/25). Midcycle BMBx 3/19/25 with no morphologic or immunophenotypic evidence of AML. D28 BMBx completed 3/31/25 with hypercellular marrow (60 to 70%) with no overt dysplasia or increase in blasts, noted flow with 4.3% blasts of unusual phenotype; MRD- by NGS. Due to overall disposition timeline as well as patient living in [facility], preceded directly with consolidation chemotherapy with HiDAC (C1D1=4/3/25) which she tolerated well. Completed C2 HiDAC (C2D1=5/6/2025) without complications. Bone marrow  biopsy completed s/p 2 cycles of HiDAC on 6/6/25 with no evidence of persistent AML; MRD pending. Now admitted for C3 HiDAC (C3D1=6/11/25).  - PICC placed upon admission, plan to remove prior to discharge  - Baseline cardiopulmonary studies:  - Echo w/ EF 60-65%, mild known aortic stenosis.  - EKG (2/27) with NSR, QTc 480  - CXR (4/27) Small area of new infiltrate versus atelectasis left lung base. No consolidation. R lung clear.  - Baseline viral serologies: CMV IgG+, EBV IgG+, HepB sAg-, HepB cAb-, HepB sAb-, HSV1+/2+, HIV-  - HLA Typing sent on 3/3/2025 and 4/25/2025. BMT Consult on 4/16, no immediate plans for bone marrow transplant.     Treatment plan: HiDAC (C3D1= 6/11/2025)   - Cytarabine 3 g/m  IV q12h D1-3   - Neulasta 6 mg D5 - to be administered at [facility], will need to request  Supportive medications: Zofran 8 mg q12h, Dexamethasone 4 mg q12h D1-3, Prednisolone eye drops QID D1-5     # Anemia  # Risk for pancytopenia  Secondary to underlying hematologic malignancy and exacerbated by recent chemotherapy. Historical pancytopenia has since recovered.  - Follow daily CBC  - Transfuse to keep Hgb >7, plt >10K     ID   # ID prophylaxis  - Acyclovir 400 mg BID  - Levaquin 250 mg daily when ANC <1.0; ANC at admission 4.4  - Posaconazole 300 mg daily - continue given smoking history   - Note: Posaconazole copay $1.60. Trialed initially on vori, then rotated to posa x3/18 given visual hallucinations    # Recent neutropenic fever  # Recent colitis  She presented to OSH ED on 5/18 with neutropenic fever, diarrhea, and abdominal pain and was found to have colitis on CT and RVP positive for coronavirus. She was transferred to [facility] overnight 5/18-5/19. On 5/19 AM, she had improved rapidly after initiation of IV cefepime and Flagyl. She had resolution of fever and improvement in abdominal pain and diarrhea. On 5/20, patient continued to improve with resolution of abdominal pain and diarrhea and resolution of  neutropenia, thought to be related to delayed Neulasta effect. Given drastic clinical improvement and resolution of neutropenia, patient was discharged to home to complete course of oral antibiotics.      GI  # GERD  # History of intermittent epigastric pain  CT C/A/P 4/14/2025 with evidence of esophagitis (thought due to reflux/recent chemotherapy) and small hiatal hernia. Symptoms have historically improved on maximal medical therapy as below.  - Continue PTA Protonix BID, Pepcid BID, Carafate QID, TUMS PRN     PULM  # COPD  Longstanding history of COPD. On admission patient reports symptoms are manageable with no recent exacerbations. Most recent PFTs (2018) were normal.  - Continue PTA Breo Ellipta inhaler and PRN DuoNebs   - Encourage smoking cessation     CV  # Essential hypertension  Previously on lisinopril 10 mg daily, which was held due to well-managed blood pressures during prior admission. Anticipate resumption during this admission with recent elevated readings.  - Trend BPs, resume lisinopril as clinically indicated.     # Infrarenal abdominal aortic aneurysm  Noted incidentally on CT C/A/P (3/19/25), measuring 3.4 cm in diameter.  - Attention on follow-up imaging     RENAL/FEN  # Stage 2 CKD  Baseline Cr ~ 0.7. On admission Cr 0.76  - Continue to trend on daily labs and encourage PO hydration     NEURO  # Chronic migraine w/o aura  # Chronic headaches  Present since childhood. Reportedly triggered by neck manipulation/movement. Reportedly well-managed with regimen below. Headaches occurring throughout admission with daily APAP use, also chronic and managed with APAP PRN at home. Patient notes that her headaches throughout prior hospitalizations have been consistent with her typical daily headaches with no reported changes in character, quality, location, severity, or frequency. She denies new associated neurological changes. May consider LP to exclude CNS leukemia, but given this reassuring history  and absence of other indications for LP (no hyperleukocytosis, no monocytic phenotype, no FLT3 mutation, etc), this was historically deferred.  - APAP PRN  - Continue PTA sumatriptan and cyclobenzaprine PRN  - Could reconsider need for LP if headache worsens/changes in character or quality in the future     MISC   # BROOKS  # MDD  # At risk for adjustment disorder  - Continue PTA paroxetine, Atarax PRN     # Tobacco use disorder  Has smoked since age 16, 1.5 ppd (roughly 65 pack years). Attempting to cut back as recently as 01/2025 to 0.5 ppd. We discussed the risks of smoking during induction chemotherapy including increased risk for infection in setting of severe neutropenia that could further complicate course, placing her at risk for severe complications that could be life-threatening or even fatal. Trialed nicotine patch, then self-discontinued after reporting that she felt the nicotine patch precipitated an anxiety attack/episode of chest pain on 3/9. Has been offered a lower dose versus alternative form of NRT, which patient declined.  - Encourage smoking cessation; patient continues to smoke at this time, despite understanding/acknowledgement of the risks. Reports trying to cut down.  - Continue posaconazole ppx, regardless of ANC, given high risk for fungal pulmonary infection in the setting of ongoing tobacco abuse.     # Degenerative disc disease  Appears likely multi-level; no MRI available for review, though note that patient has previously had epidural injections at L3-4 and L5-S1 (2016). Reported taking gabapentin 400 mg TID PRN prior to admission. Mild exacerbation noted 3/29; improved with resumption of gabapentin.  - Continue gabapentin 400 mg TID    # Deconditioning  Using cane regularly for ambulatory assistance.  - Consider PT consult while inpatient     Chronic Problems:  # Vitamin D deficiency - Continue PTA vit D supplement  # Seasonal allergies - Claritin 10 mg daily  # Rheumatoid arthritis -  "Patient reported trying treatment though was unable to tolerate well. Managed with Tylenol PRN. Most recent RF >650 (2/10/25). JI negative.    # Prediabetes - Last A1c 6.0 x12/9/24. Has been managing with lifestyle modifications.   # Mixed hyperlipidemia - Lipid panel has remained at goal, 1/14/25 panel w/ cholesterol 163, , LDL 92, HDL 45. - Held PTA atorvastatin on admission  # H/o aortic stenosis - Patient noted on admission longstanding history of aortic stenosis. Pre-chemo echo w/ EF 60-65% and mild aortic stenosis and insufficiency. No previous echo to compare.    Fluids/Electrolytes/Nutrition:  -IVF per chemotherapy protocol  -Lyte replacement per protocol  -Regular diet as tolerated    Prophy/Misc:  -GI: PTA PPI, Pepcid  -Bowels: Senna/Miralax PRN  -DVT: Lovenox ppx    Disposition: Admit to hospital for scheduled chemotherapy. Discharge pending regimen completion and clinical tolerance, anticipate ~4 day admission.    Andrey Crowell PA-C  Hematology/Oncology  Pager [phone]    Code Status : Full Code    Primary Care Physician   Physician No Ref-Primary    History of Present Illness   History obtained from chart and discussed with the patient.    Alejandra Villegas is a 59 year old female with a past medical history significant for COPD, migraines, rheumatoid arthritis, aortic stenosis, anxiety, and recent diagnosis of AML who is admitted for scheduled consolidation chemotherapy with HiDAC (C3D1=6/11/25).    Upon admission, Alejandra endorses feeling well since her last inpatient stay. She reflected on being air-lifted to the hospital, and reports complete resolution of abdominal pain and diarrhea since completion of her oral antibiotic regimen. Reports \"stubbing her toe\" recurrently, thus she is using her cane with ambulation. Endorses recent high blood pressures and plan to follow up with new PCP to discuss. Ongoing headaches on \"top of her head\" that present without photophobia, tinnitus, nuchal rigidity " or fevers. They are responsive to Tylenol. Also notes ongoing right toothache which radiates pain to her right ear. Denies shortness of breath, chest pain, epigastric pain, nausea/vomiting/diarrhea, abdominal pain, skin changes, visual disturbances, peripheral edema, fevers, chills. Appetite is intact. Near daily bowel movements. Treatment recommendations and chemotherapy teaching reviewed with patient, who endorses understanding given this is her third cycle. We discussed specific side effects of HiDAC, as well as the typical dosing schedule. We reviewed that this treatment can harm any part of the body, and complications including cytopenias, organ damage, and infections can occur, any of which can be fatal. Patient expressed understanding and consented to treatment. All concerns were addressed and questions were answered.     Past Medical History    Past Medical History:   Diagnosis Date    Allergic rhinitis 09/27/2011         Disorder of kidney and ureter 08/08/2008    Kidney disease GFR 87    Dorsalgia     No Comments Provided    Generalized anxiety disorder     No Comments Provided    Hidradenitis suppurativa     No Comments Provided    Other disorders of lung (CODE) 08/01/2007    Pulmonary nodules, stable on follow-up chest CT 12/08.  No further imaging recommended.    Other specified disorders of Eustachian tube, unspecified ear 02/27/2012         Personal history of other medical treatment (CODE)     Childbirth x4    Rheumatoid arthritis (H) 02/27/2012         Tobacco use     No Comments Provided     Past Surgical History   Past Surgical History:   Procedure Laterality Date    ARTHROSCOPY KNEE  05/2017    Dr Torres    ARTHROSCOPY KNEE  07/20/2017    Dr Garza, lateral release, meniscal repair    ARTHROTOMY WRIST Left 2011    Dr. Torres    BONE MARROW BIOPSY, BONE SPECIMEN, NEEDLE/TROCAR Left 02/13/2025    Procedure: Bone marrow biopsy, bone specimen, needle/trocar;  Surgeon: Hema Mari MD;  Location:  [facility] OR    CHOLECYSTECTOMY  06/2007    Emergency tracheotomy following failed intubation for laparoscopic cholecystectomy.    DILATION AND CURETTAGE  09/2006         HERNIA REPAIR  2007    Incisoinal hernia repair    HYSTERECTOMY TOTAL ABDOMINAL  02/2010         IMPLANT STIMULATOR AND LEADS SACRAL NERVE (STAGE ONE AND TWO) N/A 12/11/2018    Procedure: Interstim Battery Replacement;  Surgeon: Rl Ambriz MD;  Location: [facility] OR    INTERSTIM DEVICE STAGE 2  01/06/2014    Dr. Ambriz [facility]    LAPAROSCOPIC ABLATION ENDOMETRIOSIS  09/2006         OOPHORECTOMY Left 2010     Dr Thorpe    PICC INSERTION - DOUBLE LUMEN Right 03/05/2025    5FR, DL, total cath length=42 CM, visible cath length= 3CM, brachial medial vein    PICC INSERTION - DOUBLE LUMEN Right 05/06/2025    44-3Ccm, Basilic vein    RECONSTRUCT FOREFOOT WITH METATARSOPHALANGEAL (MTP) FUSION Right 05/05/2022    Procedure: Right fibular sesmoid excision and 1st metatarsal phalangeal joint fusion;  Surgeon: Rl Nathan DPM;  Location: [facility] OR    RELEASE CARPAL TUNNEL  02/02/2017         RELEASE PLANTAR FASCIA Left 12/19/2024    Procedure: excision of soft tissue mass left foot,  gastroc recession;  Surgeon: Rl Nathan DPM;  Location:  OR    REMOVE HARDWARE FOOT Right 08/10/2023    Procedure: REMOVAL, HARDWARE, FOOT;  Surgeon: Rl Nathan DPM;  Location: [facility] OR    SALPINGO OOPHORECTOMY,R/L/KELSEY Right 06/1999    Right salpingo-oophorectomy for hemorrhagic corpus luteum cyst    TRACHEOSTOMY  2007    emergency following failed intubation during cholecystectomy    TYMPANOSTOMY, LOCAL/TOPICAL ANESTHESIA  08/2006    PE tube placement       Prior to Admission Medications   Prior to Admission Medications   Prescriptions Last Dose Informant Patient Reported? Taking?   FT TUSSIN DM ADULT  MG/20ML LIQD   Yes No   PARoxetine (PAXIL) 20 MG tablet   No No   Sig: Take 1 tablet (20 mg) by mouth every morning.   SUMAtriptan (IMITREX) 50 MG tablet   No No   Sig: Take  1 tablet (50 mg) by mouth at onset of headache for migraine.   Vitamin D3 (CHOLECALCIFEROL) 25 mcg (1000 units) tablet   No No   Sig: Take 1 tablet (25 mcg) by mouth daily.   acetaminophen (TYLENOL) 325 MG tablet   No No   Sig: Take 2 tablets (650 mg) by mouth every 4 hours as needed for mild pain.   acyclovir (ZOVIRAX) 400 MG tablet   No No   Sig: Take 1 tablet (400 mg) by mouth 2 times daily.   albuterol (PROAIR HFA/PROVENTIL HFA/VENTOLIN HFA) 108 (90 Base) MCG/ACT inhaler   No No   Sig: Inhale 1-2 puffs into the lungs 4 times daily as needed (Refractory bronchospasm associated with hypersensitivity reaction).   albuterol (PROVENTIL) (2.5 MG/3ML) 0.083% neb solution   No No   Sig: Take 1 vial (2.5 mg) by nebulization 4 times daily as needed (Refractory bronchospasm associated with hypersensitivity reaction).   budesonide-formoterol (SYMBICORT) 80-4.5 MCG/ACT Inhaler   No No   Sig: Inhale 2 puffs into the lungs 2 times daily - Rinse mouth well after use to prevent Thrush   calcium carbonate (TUMS) 500 MG chewable tablet   No No   Sig: Take 1 tablet (500 mg) by mouth 3 times daily as needed for heartburn.   cyanocobalamin (VITAMIN B-12) 500 MCG tablet   Yes No   Sig: Take 500 mcg by mouth daily.   cyclobenzaprine (FLEXERIL) 10 MG tablet   No No   Sig: Take 1 tablet (10 mg) by mouth 3 times daily as needed for muscle spasms.   famotidine (PEPCID) 20 MG tablet   No No   Sig: Take 1 tablet (20 mg) by mouth 2 times daily.   fluticasone (FLONASE) 50 MCG/ACT nasal spray   No No   Sig: Spray 2 sprays into both nostrils 2 times daily.   gabapentin (NEURONTIN) 400 MG capsule   No No   Sig: Take 1 capsule (400 mg) by mouth 3 times daily.   guaiFENesin (MUCINEX) 600 MG 12 hr tablet   No No   Sig: Take 2 tablets (1,200 mg) by mouth 2 times daily.   guaiFENesin-dextromethorphan (ROBITUSSIN DM) 100-10 MG/5ML syrup   No No   Sig: Take 5 mLs by mouth every 4 hours as needed for cough.   hydrOXYzine HCl (ATARAX) 25 MG tablet   No No    Sig: Take 1-2 tablets (25-50 mg) by mouth every 6 hours as needed for anxiety or itching (adjuvant pain, sleep).   levofloxacin (LEVAQUIN) 750 MG tablet   No No   Sig: Take 1 tablet (750 mg) by mouth daily.   loperamide (IMODIUM) 2 MG capsule   No No   Sig: Take 2 capsules (4 mg) by mouth 3 times daily as needed for diarrhea.   loratadine (CLARITIN) 10 MG tablet   No No   Sig: Take 1 tablet (10 mg) by mouth daily.   ondansetron (ZOFRAN ODT) 4 MG ODT tab   No No   Sig: Take 1-2 tablets (4-8 mg) by mouth every 8 hours as needed for nausea or vomiting.   order for DME   No No   Sig: Equipment being ordered: home neb set up with mask and tubing   pantoprazole (PROTONIX) 40 MG EC tablet   No No   Sig: Take 1 tablet (40 mg) by mouth 2 times daily (before meals).   posaconazole (NOXAFIL) 100 MG DR tablet   No No   Sig: Take 3 tablets (300 mg) by mouth every morning.   potassium chloride ER (K-TAB) 20 MEQ CR tablet   Yes No   Sig: Take 20 mEq by mouth daily.   prochlorperazine (COMPAZINE) 5 MG tablet   No No   Sig: Take 1 tablet (5 mg) by mouth every 6 hours as needed for nausea or vomiting.   rosuvastatin (CRESTOR) 20 MG tablet   No No   Sig: Take 1 tablet (20 mg) by mouth daily.   sucralfate (CARAFATE) 1 GM/10ML suspension   No No   Sig: Take 10 mLs (1 g) by mouth 4 times daily (before meals and nightly).      Facility-Administered Medications: None     Allergies   Allergies   Allergen Reactions    Adhesive Tape     Bee Pollen Swelling     Seasonal    Bee Venom Unknown    Folic Acid Itching    Pollen Extract      Seasonal    Amoxicillin Rash    Chlorhexidine Rash     After several weeks, started to develop rash on R neck down to chest and arm. Also noted on back of knees. Providers suggesting related to CHG as nothing else is new for pt.     Liquid Adhesive Rash    Meloxicam Rash    Nabumetone GI Disturbance     GI upset     Social History   Social History     Socioeconomic History    Marital status:      Spouse  name: Not on file    Number of children: Not on file    Years of education: Not on file    Highest education level: Not on file   Occupational History    Not on file   Tobacco Use    Smoking status: Every Day     Current packs/day: 1.00     Average packs/day: 1 pack/day for 44.4 years (44.4 ttl pk-yrs)     Types: Cigarettes     Start date: 1981     Passive exposure: Past    Smokeless tobacco: Never    Tobacco comments:     Passive exposure in childhood and adult homes and some work places   Vaping Use    Vaping status: Never Used   Substance and Sexual Activity    Alcohol use: No     Alcohol/week: 0.0 standard drinks of alcohol    Drug use: No    Sexual activity: Not Currently     Partners: Male   Other Topics Concern    Parent/sibling w/ CABG, MI or angioplasty before 65F 55M? Not Asked   Social History Narrative     four times and now . She is unemployed.  Lives with her oldest son(he lives with her).  4 sons, 1 son lives with her others are all in the area.     Social Drivers of Health     Financial Resource Strain: Low Risk  (5/19/2025)    Financial Resource Strain     Within the past 12 months, have you or your family members you live with been unable to get utilities (heat, electricity) when it was really needed?: No   Food Insecurity: Low Risk  (5/19/2025)    Food Insecurity     Within the past 12 months, did you worry that your food would run out before you got money to buy more?: No     Within the past 12 months, did the food you bought just not last and you didn t have money to get more?: No   Transportation Needs: Low Risk  (5/19/2025)    Transportation Needs     Within the past 12 months, has lack of transportation kept you from medical appointments, getting your medicines, non-medical meetings or appointments, work, or from getting things that you need?: No   Physical Activity: Not on file   Stress: Not on file   Social Connections: Not on file   Interpersonal Safety: Low Risk  (6/4/2025)     Interpersonal Safety     Do you feel physically and emotionally safe where you currently live?: Yes     Within the past 12 months, have you been hit, slapped, kicked or otherwise physically hurt by someone?: No     Within the past 12 months, have you been humiliated or emotionally abused in other ways by your partner or ex-partner?: No   Housing Stability: Low Risk  (5/19/2025)    Housing Stability     Do you have housing? : Yes     Are you worried about losing your housing?: No     Family History   Family History   Problem Relation Age of Onset    Arthritis Mother         Arthritis,Rheumatoid    Cancer Mother         Cancer, lung and uterine cancer    Heart Disease Father         Heart Disease,CAD, CABG, peripheral vascular dise    Thyroid Disease Father         Thyroid Disease, hyperlipidemia    Other - See Comments Father         djd    Asthma Brother         Asthma    Asthma Sister         Asthma    Other - See Comments Sister         Psychiatric illness,Anxiety    Other - See Comments Son         x2 back surgeries    Breast Cancer No family hx of         Cancer-breast     Review of Systems   A 10 point ROS is negative unless otherwise noted above in the HPI.    Physical Exam   Vital Signs with Ranges  Temp:  [98.1  F (36.7  C)-98.4  F (36.9  C)] 98.4  F (36.9  C)  Pulse:  [84] 84  Resp:  [18] 18  BP: (153-154)/(88-90) 154/88  SpO2:  [96 %-98 %] 98 %  178 lbs 1.6 oz    Constitutional: Pleasant and cooperative female. Awake, alert, NAD.  HEENT: NC/AT, EOMI, sclera clear, conjunctiva normal, OP with MMM. Poor dentition.   Respiratory: No increased work of breathing, CTAB, no crackles. Diffuse inspiratory/expiratory wheezing.  Cardiovascular: RRR, systolic murmur noted. No peripheral edema.  GI: Normal bowel sounds, soft, non-distended and non-tender.  Skin: Warm, dry, well-perfused. No bruising, bleeding, rashes, or lesions on limited exam. RPIC bone marrow biopsy site tender, without surrounding erythema or  drainage.  Neurologic: A&O. Answers questions appropriately. Moves all extremities spontaneously.  Neuropsychiatric: Calm, appropriate affect    Recent Labs  CBC   Recent Labs   Lab 06/11/25  1239 06/09/25  1311 06/06/25  1223   WBC 7.5 8.3 6.9   RBC 3.49* 3.80 3.41*   HGB 10.6* 11.6* 10.3*   HCT 33.8* 36.8 33.0*   MCV 97 97 97   MCH 30.4 30.5 30.2   MCHC 31.4* 31.5 31.2*   RDW 22.8* 23.5* 23.8*    539* 482*     CMP   Recent Labs   Lab 06/11/25  1239 06/09/25  1311 06/06/25  1223    140 141   POTASSIUM 3.6 3.5 3.7   CHLORIDE 103 101 102   CO2 24 25 25   ANIONGAP 13 14 14   * 126* 109*   BUN 7.8 6.4 6.2   CR 0.62 0.75 0.61   GFRESTIMATED >90 >90 >90   TOBIN 9.8 9.4 9.5   PROTTOTAL 7.2 7.4 7.1   ALBUMIN 3.9 3.9 3.8   BILITOTAL 0.4 0.4 0.3   ALKPHOS 120 133 140   AST 21 22 22   ALT 10 13 11     LFTs:   Recent Labs   Lab 06/11/25  1239   PROTTOTAL 7.2   ALBUMIN 3.9   BILITOTAL 0.4   ALKPHOS 120   AST 21   ALT 10     Coagulation Studies: No lab results found in last 7 days.

## 2025-06-11 ENCOUNTER — HOSPITAL ENCOUNTER (INPATIENT)
Facility: CLINIC | Age: 60
DRG: 838 | End: 2025-06-11
Attending: INTERNAL MEDICINE | Admitting: STUDENT IN AN ORGANIZED HEALTH CARE EDUCATION/TRAINING PROGRAM
Payer: MEDICARE

## 2025-06-11 ENCOUNTER — ONCOLOGY VISIT (OUTPATIENT)
Dept: ONCOLOGY | Facility: CLINIC | Age: 60
End: 2025-06-11
Attending: STUDENT IN AN ORGANIZED HEALTH CARE EDUCATION/TRAINING PROGRAM
Payer: MEDICARE

## 2025-06-11 ENCOUNTER — APPOINTMENT (OUTPATIENT)
Dept: LAB | Facility: CLINIC | Age: 60
DRG: 838 | End: 2025-06-11
Attending: STUDENT IN AN ORGANIZED HEALTH CARE EDUCATION/TRAINING PROGRAM
Payer: MEDICARE

## 2025-06-11 VITALS
OXYGEN SATURATION: 96 % | RESPIRATION RATE: 18 BRPM | SYSTOLIC BLOOD PRESSURE: 153 MMHG | HEART RATE: 84 BPM | DIASTOLIC BLOOD PRESSURE: 90 MMHG | BODY MASS INDEX: 28 KG/M2 | TEMPERATURE: 98.1 F | WEIGHT: 178.8 LBS

## 2025-06-11 DIAGNOSIS — C92.00 ACUTE MYELOID LEUKEMIA NOT HAVING ACHIEVED REMISSION (H): Primary | ICD-10-CM

## 2025-06-11 DIAGNOSIS — C92.01 ACUTE MYELOID LEUKEMIA IN REMISSION (H): ICD-10-CM

## 2025-06-11 LAB
ALBUMIN SERPL BCG-MCNC: 3.9 G/DL (ref 3.5–5.2)
ALBUMIN SERPL BCG-MCNC: 3.9 G/DL (ref 3.5–5.2)
ALP SERPL-CCNC: 111 U/L (ref 40–150)
ALP SERPL-CCNC: 120 U/L (ref 40–150)
ALT SERPL W P-5'-P-CCNC: 10 U/L (ref 0–50)
ALT SERPL W P-5'-P-CCNC: 13 U/L (ref 0–50)
ANION GAP SERPL CALCULATED.3IONS-SCNC: 13 MMOL/L (ref 7–15)
ANION GAP SERPL CALCULATED.3IONS-SCNC: 13 MMOL/L (ref 7–15)
AST SERPL W P-5'-P-CCNC: 21 U/L (ref 0–45)
AST SERPL W P-5'-P-CCNC: 25 U/L (ref 0–45)
BASOPHILS # BLD AUTO: 0.1 10E3/UL (ref 0–0.2)
BASOPHILS # BLD AUTO: 0.2 10E3/UL (ref 0–0.2)
BASOPHILS NFR BLD AUTO: 1 %
BASOPHILS NFR BLD AUTO: 2 %
BILIRUB SERPL-MCNC: 0.4 MG/DL
BILIRUB SERPL-MCNC: 0.4 MG/DL
BUN SERPL-MCNC: 7.6 MG/DL (ref 6–20)
BUN SERPL-MCNC: 7.8 MG/DL (ref 6–20)
CALCIUM SERPL-MCNC: 9.5 MG/DL (ref 8.8–10.4)
CALCIUM SERPL-MCNC: 9.8 MG/DL (ref 8.8–10.4)
CHLORIDE SERPL-SCNC: 100 MMOL/L (ref 98–107)
CHLORIDE SERPL-SCNC: 103 MMOL/L (ref 98–107)
CREAT SERPL-MCNC: 0.62 MG/DL (ref 0.51–0.95)
CREAT SERPL-MCNC: 0.64 MG/DL (ref 0.51–0.95)
EGFRCR SERPLBLD CKD-EPI 2021: >90 ML/MIN/1.73M2
EGFRCR SERPLBLD CKD-EPI 2021: >90 ML/MIN/1.73M2
EOSINOPHIL # BLD AUTO: 0.2 10E3/UL (ref 0–0.7)
EOSINOPHIL # BLD AUTO: 0.2 10E3/UL (ref 0–0.7)
EOSINOPHIL NFR BLD AUTO: 2 %
EOSINOPHIL NFR BLD AUTO: 3 %
ERYTHROCYTE [DISTWIDTH] IN BLOOD BY AUTOMATED COUNT: 22.8 % (ref 10–15)
ERYTHROCYTE [DISTWIDTH] IN BLOOD BY AUTOMATED COUNT: 22.8 % (ref 10–15)
FIBRINOGEN PPP-MCNC: 700 MG/DL (ref 170–510)
GLUCOSE SERPL-MCNC: 102 MG/DL (ref 70–99)
GLUCOSE SERPL-MCNC: 110 MG/DL (ref 70–99)
HCO3 SERPL-SCNC: 24 MMOL/L (ref 22–29)
HCO3 SERPL-SCNC: 27 MMOL/L (ref 22–29)
HCT VFR BLD AUTO: 33.1 % (ref 35–47)
HCT VFR BLD AUTO: 33.8 % (ref 35–47)
HGB BLD-MCNC: 10.2 G/DL (ref 11.7–15.7)
HGB BLD-MCNC: 10.6 G/DL (ref 11.7–15.7)
IMM GRANULOCYTES # BLD: 0 10E3/UL
IMM GRANULOCYTES # BLD: 0.1 10E3/UL
IMM GRANULOCYTES NFR BLD: 0 %
IMM GRANULOCYTES NFR BLD: 1 %
INR PPP: 1.1 (ref 0.85–1.15)
LDH SERPL L TO P-CCNC: 333 U/L (ref 0–250)
LYMPHOCYTES # BLD AUTO: 0.6 10E3/UL (ref 0.8–5.3)
LYMPHOCYTES # BLD AUTO: 0.7 10E3/UL (ref 0.8–5.3)
LYMPHOCYTES NFR BLD AUTO: 7 %
LYMPHOCYTES NFR BLD AUTO: 9 %
MAGNESIUM SERPL-MCNC: 2.2 MG/DL (ref 1.7–2.3)
MCH RBC QN AUTO: 30.4 PG (ref 26.5–33)
MCH RBC QN AUTO: 30.5 PG (ref 26.5–33)
MCHC RBC AUTO-ENTMCNC: 30.8 G/DL (ref 31.5–36.5)
MCHC RBC AUTO-ENTMCNC: 31.4 G/DL (ref 31.5–36.5)
MCV RBC AUTO: 97 FL (ref 78–100)
MCV RBC AUTO: 99 FL (ref 78–100)
MONOCYTES # BLD AUTO: 2 10E3/UL (ref 0–1.3)
MONOCYTES # BLD AUTO: 2 10E3/UL (ref 0–1.3)
MONOCYTES NFR BLD AUTO: 23 %
MONOCYTES NFR BLD AUTO: 27 %
NEUTROPHILS # BLD AUTO: 4.4 10E3/UL (ref 1.6–8.3)
NEUTROPHILS # BLD AUTO: 5.6 10E3/UL (ref 1.6–8.3)
NEUTROPHILS NFR BLD AUTO: 59 %
NEUTROPHILS NFR BLD AUTO: 66 %
NRBC # BLD AUTO: 0 10E3/UL
NRBC # BLD AUTO: 0 10E3/UL
NRBC BLD AUTO-RTO: 0 /100
NRBC BLD AUTO-RTO: 0 /100
PHOSPHATE SERPL-MCNC: 4 MG/DL (ref 2.5–4.5)
PLAT MORPH BLD: ABNORMAL
PLAT MORPH BLD: ABNORMAL
PLATELET # BLD AUTO: 426 10E3/UL (ref 150–450)
PLATELET # BLD AUTO: 442 10E3/UL (ref 150–450)
POLYCHROMASIA BLD QL SMEAR: SLIGHT
POLYCHROMASIA BLD QL SMEAR: SLIGHT
POTASSIUM SERPL-SCNC: 3.4 MMOL/L (ref 3.4–5.3)
POTASSIUM SERPL-SCNC: 3.6 MMOL/L (ref 3.4–5.3)
PROT SERPL-MCNC: 6.9 G/DL (ref 6.4–8.3)
PROT SERPL-MCNC: 7.2 G/DL (ref 6.4–8.3)
PROTHROMBIN TIME: 14.2 SECONDS (ref 11.8–14.8)
RBC # BLD AUTO: 3.34 10E6/UL (ref 3.8–5.2)
RBC # BLD AUTO: 3.49 10E6/UL (ref 3.8–5.2)
RBC MORPH BLD: ABNORMAL
RBC MORPH BLD: ABNORMAL
SODIUM SERPL-SCNC: 140 MMOL/L (ref 135–145)
SODIUM SERPL-SCNC: 140 MMOL/L (ref 135–145)
URATE SERPL-MCNC: 4.7 MG/DL (ref 2.4–5.7)
WBC # BLD AUTO: 7.5 10E3/UL (ref 4–11)
WBC # BLD AUTO: 8.5 10E3/UL (ref 4–11)

## 2025-06-11 PROCEDURE — 84100 ASSAY OF PHOSPHORUS: CPT

## 2025-06-11 PROCEDURE — 85004 AUTOMATED DIFF WBC COUNT: CPT

## 2025-06-11 PROCEDURE — 250N000013 HC RX MED GY IP 250 OP 250 PS 637

## 2025-06-11 PROCEDURE — 83615 LACTATE (LD) (LDH) ENZYME: CPT

## 2025-06-11 PROCEDURE — 250N000012 HC RX MED GY IP 250 OP 636 PS 637: Performed by: STUDENT IN AN ORGANIZED HEALTH CARE EDUCATION/TRAINING PROGRAM

## 2025-06-11 PROCEDURE — 83735 ASSAY OF MAGNESIUM: CPT

## 2025-06-11 PROCEDURE — 84550 ASSAY OF BLOOD/URIC ACID: CPT

## 2025-06-11 PROCEDURE — 84155 ASSAY OF PROTEIN SERUM: CPT

## 2025-06-11 PROCEDURE — 99223 1ST HOSP IP/OBS HIGH 75: CPT | Mod: FS

## 2025-06-11 PROCEDURE — 3E04305 INTRODUCTION OF OTHER ANTINEOPLASTIC INTO CENTRAL VEIN, PERCUTANEOUS APPROACH: ICD-10-PCS | Performed by: STUDENT IN AN ORGANIZED HEALTH CARE EDUCATION/TRAINING PROGRAM

## 2025-06-11 PROCEDURE — 85384 FIBRINOGEN ACTIVITY: CPT

## 2025-06-11 PROCEDURE — 258N000003 HC RX IP 258 OP 636: Performed by: STUDENT IN AN ORGANIZED HEALTH CARE EDUCATION/TRAINING PROGRAM

## 2025-06-11 PROCEDURE — 250N000009 HC RX 250: Performed by: STUDENT IN AN ORGANIZED HEALTH CARE EDUCATION/TRAINING PROGRAM

## 2025-06-11 PROCEDURE — G0463 HOSPITAL OUTPT CLINIC VISIT: HCPCS | Performed by: STUDENT IN AN ORGANIZED HEALTH CARE EDUCATION/TRAINING PROGRAM

## 2025-06-11 PROCEDURE — 250N000011 HC RX IP 250 OP 636: Performed by: STUDENT IN AN ORGANIZED HEALTH CARE EDUCATION/TRAINING PROGRAM

## 2025-06-11 PROCEDURE — 250N000011 HC RX IP 250 OP 636

## 2025-06-11 PROCEDURE — 36415 COLL VENOUS BLD VENIPUNCTURE: CPT | Performed by: STUDENT IN AN ORGANIZED HEALTH CARE EDUCATION/TRAINING PROGRAM

## 2025-06-11 PROCEDURE — 36569 INSJ PICC 5 YR+ W/O IMAGING: CPT

## 2025-06-11 PROCEDURE — 85610 PROTHROMBIN TIME: CPT

## 2025-06-11 PROCEDURE — 120N000002 HC R&B MED SURG/OB UMMC

## 2025-06-11 PROCEDURE — 84132 ASSAY OF SERUM POTASSIUM: CPT | Performed by: STUDENT IN AN ORGANIZED HEALTH CARE EDUCATION/TRAINING PROGRAM

## 2025-06-11 PROCEDURE — 272N000451 HC KIT SHRLOCK 5FR POWER PICC DOUBLE LUMEN

## 2025-06-11 PROCEDURE — 250N000009 HC RX 250

## 2025-06-11 PROCEDURE — 85025 COMPLETE CBC W/AUTO DIFF WBC: CPT | Performed by: STUDENT IN AN ORGANIZED HEALTH CARE EDUCATION/TRAINING PROGRAM

## 2025-06-11 RX ORDER — LORAZEPAM 0.5 MG/1
.5-1 TABLET ORAL EVERY 6 HOURS PRN
Status: DISCONTINUED | OUTPATIENT
Start: 2025-06-11 | End: 2025-06-14 | Stop reason: HOSPADM

## 2025-06-11 RX ORDER — LORATADINE 10 MG/1
10 TABLET ORAL DAILY
Status: DISCONTINUED | OUTPATIENT
Start: 2025-06-12 | End: 2025-06-14 | Stop reason: HOSPADM

## 2025-06-11 RX ORDER — ACYCLOVIR 400 MG/1
400 TABLET ORAL 2 TIMES DAILY
Status: DISCONTINUED | OUTPATIENT
Start: 2025-06-11 | End: 2025-06-14 | Stop reason: HOSPADM

## 2025-06-11 RX ORDER — PREDNISOLONE ACETATE 10 MG/ML
2 SUSPENSION/ DROPS OPHTHALMIC 4 TIMES DAILY
Status: CANCELLED | OUTPATIENT
Start: 2025-06-11

## 2025-06-11 RX ORDER — FAMOTIDINE 20 MG/1
20 TABLET, FILM COATED ORAL 2 TIMES DAILY
Status: DISCONTINUED | OUTPATIENT
Start: 2025-06-11 | End: 2025-06-14 | Stop reason: HOSPADM

## 2025-06-11 RX ORDER — PROCHLORPERAZINE MALEATE 10 MG
10 TABLET ORAL EVERY 6 HOURS PRN
Status: CANCELLED
Start: 2025-06-11

## 2025-06-11 RX ORDER — FLUTICASONE PROPIONATE 50 MCG
2 SPRAY, SUSPENSION (ML) NASAL 2 TIMES DAILY
Status: DISCONTINUED | OUTPATIENT
Start: 2025-06-11 | End: 2025-06-14 | Stop reason: HOSPADM

## 2025-06-11 RX ORDER — BUDESONIDE AND FORMOTEROL FUMARATE DIHYDRATE 80; 4.5 UG/1; UG/1
2 AEROSOL RESPIRATORY (INHALATION) 2 TIMES DAILY
Status: DISCONTINUED | OUTPATIENT
Start: 2025-06-11 | End: 2025-06-14 | Stop reason: HOSPADM

## 2025-06-11 RX ORDER — LORAZEPAM 0.5 MG/1
.5-1 TABLET ORAL EVERY 6 HOURS PRN
Status: CANCELLED
Start: 2025-06-11

## 2025-06-11 RX ORDER — LORAZEPAM 2 MG/ML
.5-1 INJECTION INTRAMUSCULAR EVERY 6 HOURS PRN
Status: CANCELLED | OUTPATIENT
Start: 2025-06-11

## 2025-06-11 RX ORDER — MULTIVITAMIN WITH IRON
500 TABLET ORAL DAILY
Status: DISCONTINUED | OUTPATIENT
Start: 2025-06-12 | End: 2025-06-14 | Stop reason: HOSPADM

## 2025-06-11 RX ORDER — AMOXICILLIN 250 MG
2 CAPSULE ORAL 2 TIMES DAILY PRN
Status: DISCONTINUED | OUTPATIENT
Start: 2025-06-11 | End: 2025-06-14 | Stop reason: HOSPADM

## 2025-06-11 RX ORDER — SUCRALFATE ORAL 1 G/10ML
1 SUSPENSION ORAL
Status: DISCONTINUED | OUTPATIENT
Start: 2025-06-11 | End: 2025-06-14 | Stop reason: HOSPADM

## 2025-06-11 RX ORDER — METHYLPREDNISOLONE SODIUM SUCCINATE 40 MG/ML
40 INJECTION INTRAMUSCULAR; INTRAVENOUS
Status: DISCONTINUED | OUTPATIENT
Start: 2025-06-11 | End: 2025-06-14 | Stop reason: HOSPADM

## 2025-06-11 RX ORDER — ALBUTEROL SULFATE 0.83 MG/ML
2.5 SOLUTION RESPIRATORY (INHALATION) 4 TIMES DAILY PRN
Status: DISCONTINUED | OUTPATIENT
Start: 2025-06-11 | End: 2025-06-14 | Stop reason: HOSPADM

## 2025-06-11 RX ORDER — DIPHENHYDRAMINE HYDROCHLORIDE 50 MG/ML
25 INJECTION, SOLUTION INTRAMUSCULAR; INTRAVENOUS
Status: DISCONTINUED | OUTPATIENT
Start: 2025-06-11 | End: 2025-06-14 | Stop reason: HOSPADM

## 2025-06-11 RX ORDER — ALBUTEROL SULFATE 0.83 MG/ML
2.5 SOLUTION RESPIRATORY (INHALATION)
Status: DISCONTINUED | OUTPATIENT
Start: 2025-06-11 | End: 2025-06-14 | Stop reason: HOSPADM

## 2025-06-11 RX ORDER — PAROXETINE 20 MG/1
20 TABLET, FILM COATED ORAL EVERY MORNING
Status: DISCONTINUED | OUTPATIENT
Start: 2025-06-12 | End: 2025-06-14 | Stop reason: HOSPADM

## 2025-06-11 RX ORDER — MEPERIDINE HYDROCHLORIDE 25 MG/ML
25 INJECTION INTRAMUSCULAR; INTRAVENOUS; SUBCUTANEOUS
Status: DISCONTINUED | OUTPATIENT
Start: 2025-06-11 | End: 2025-06-14 | Stop reason: HOSPADM

## 2025-06-11 RX ORDER — ALBUTEROL SULFATE 90 UG/1
1-2 INHALANT RESPIRATORY (INHALATION)
Status: DISCONTINUED | OUTPATIENT
Start: 2025-06-11 | End: 2025-06-14 | Stop reason: HOSPADM

## 2025-06-11 RX ORDER — CALCIUM CARBONATE 500 MG/1
500 TABLET, CHEWABLE ORAL 3 TIMES DAILY PRN
Status: DISCONTINUED | OUTPATIENT
Start: 2025-06-11 | End: 2025-06-14 | Stop reason: HOSPADM

## 2025-06-11 RX ORDER — ONDANSETRON 8 MG/1
8 TABLET, FILM COATED ORAL EVERY 12 HOURS
Status: DISCONTINUED | OUTPATIENT
Start: 2025-06-11 | End: 2025-06-14 | Stop reason: HOSPADM

## 2025-06-11 RX ORDER — DIPHENHYDRAMINE HYDROCHLORIDE 50 MG/ML
50 INJECTION, SOLUTION INTRAMUSCULAR; INTRAVENOUS
Status: DISCONTINUED | OUTPATIENT
Start: 2025-06-11 | End: 2025-06-14 | Stop reason: HOSPADM

## 2025-06-11 RX ORDER — POSACONAZOLE 100 MG/1
300 TABLET, DELAYED RELEASE ORAL EVERY MORNING
Status: DISCONTINUED | OUTPATIENT
Start: 2025-06-12 | End: 2025-06-14 | Stop reason: HOSPADM

## 2025-06-11 RX ORDER — ALBUTEROL SULFATE 90 UG/1
1-2 INHALANT RESPIRATORY (INHALATION) 4 TIMES DAILY PRN
Status: DISCONTINUED | OUTPATIENT
Start: 2025-06-11 | End: 2025-06-14 | Stop reason: HOSPADM

## 2025-06-11 RX ORDER — METHYLPREDNISOLONE SODIUM SUCCINATE 40 MG/ML
40 INJECTION INTRAMUSCULAR; INTRAVENOUS
Status: CANCELLED
Start: 2025-06-11

## 2025-06-11 RX ORDER — AMOXICILLIN 250 MG
1 CAPSULE ORAL 2 TIMES DAILY PRN
Status: DISCONTINUED | OUTPATIENT
Start: 2025-06-11 | End: 2025-06-14 | Stop reason: HOSPADM

## 2025-06-11 RX ORDER — EPINEPHRINE 1 MG/ML
0.3 INJECTION, SOLUTION, CONCENTRATE INTRAVENOUS EVERY 5 MIN PRN
Status: DISCONTINUED | OUTPATIENT
Start: 2025-06-11 | End: 2025-06-14 | Stop reason: HOSPADM

## 2025-06-11 RX ORDER — POLYETHYLENE GLYCOL 3350 17 G/17G
17 POWDER, FOR SOLUTION ORAL 2 TIMES DAILY PRN
Status: DISCONTINUED | OUTPATIENT
Start: 2025-06-11 | End: 2025-06-14 | Stop reason: HOSPADM

## 2025-06-11 RX ORDER — PROCHLORPERAZINE MALEATE 10 MG
10 TABLET ORAL EVERY 6 HOURS PRN
Status: DISCONTINUED | OUTPATIENT
Start: 2025-06-11 | End: 2025-06-11

## 2025-06-11 RX ORDER — PREDNISOLONE ACETATE 10 MG/ML
2 SUSPENSION/ DROPS OPHTHALMIC 4 TIMES DAILY
Status: DISCONTINUED | OUTPATIENT
Start: 2025-06-11 | End: 2025-06-14 | Stop reason: HOSPADM

## 2025-06-11 RX ORDER — VITAMIN B COMPLEX
25 TABLET ORAL DAILY
Status: DISCONTINUED | OUTPATIENT
Start: 2025-06-12 | End: 2025-06-14 | Stop reason: HOSPADM

## 2025-06-11 RX ORDER — LIDOCAINE 40 MG/G
CREAM TOPICAL
Status: DISCONTINUED | OUTPATIENT
Start: 2025-06-11 | End: 2025-06-14 | Stop reason: HOSPADM

## 2025-06-11 RX ORDER — PANTOPRAZOLE SODIUM 40 MG/1
40 TABLET, DELAYED RELEASE ORAL
Status: DISCONTINUED | OUTPATIENT
Start: 2025-06-11 | End: 2025-06-14 | Stop reason: HOSPADM

## 2025-06-11 RX ORDER — MEPERIDINE HYDROCHLORIDE 25 MG/ML
25 INJECTION INTRAMUSCULAR; INTRAVENOUS; SUBCUTANEOUS
Status: CANCELLED | OUTPATIENT
Start: 2025-06-11

## 2025-06-11 RX ORDER — DEXAMETHASONE 4 MG/1
4 TABLET ORAL EVERY 12 HOURS
Status: COMPLETED | OUTPATIENT
Start: 2025-06-11 | End: 2025-06-14

## 2025-06-11 RX ORDER — LORAZEPAM 2 MG/ML
.5-1 INJECTION INTRAMUSCULAR EVERY 6 HOURS PRN
Status: DISCONTINUED | OUTPATIENT
Start: 2025-06-11 | End: 2025-06-14 | Stop reason: HOSPADM

## 2025-06-11 RX ORDER — ONDANSETRON 8 MG/1
8 TABLET, FILM COATED ORAL EVERY 12 HOURS
Status: CANCELLED | OUTPATIENT
Start: 2025-06-11

## 2025-06-11 RX ORDER — HEPARIN SODIUM,PORCINE 10 UNIT/ML
5-15 VIAL (ML) INTRAVENOUS EVERY 24 HOURS
Status: DISCONTINUED | OUTPATIENT
Start: 2025-06-11 | End: 2025-06-14 | Stop reason: HOSPADM

## 2025-06-11 RX ORDER — ALBUTEROL SULFATE 90 UG/1
1-2 INHALANT RESPIRATORY (INHALATION)
Status: CANCELLED
Start: 2025-06-11

## 2025-06-11 RX ORDER — DIPHENHYDRAMINE HYDROCHLORIDE 50 MG/ML
50 INJECTION, SOLUTION INTRAMUSCULAR; INTRAVENOUS
Status: CANCELLED
Start: 2025-06-11

## 2025-06-11 RX ORDER — ALBUTEROL SULFATE 0.83 MG/ML
2.5 SOLUTION RESPIRATORY (INHALATION)
Status: CANCELLED | OUTPATIENT
Start: 2025-06-11

## 2025-06-11 RX ORDER — EPINEPHRINE 1 MG/ML
0.3 INJECTION, SOLUTION INTRAMUSCULAR; SUBCUTANEOUS EVERY 5 MIN PRN
Status: CANCELLED | OUTPATIENT
Start: 2025-06-11

## 2025-06-11 RX ORDER — SUCRALFATE ORAL 1 G/10ML
10 SUSPENSION ORAL 3 TIMES DAILY
COMMUNITY
End: 2025-06-25

## 2025-06-11 RX ORDER — DIPHENHYDRAMINE HYDROCHLORIDE 50 MG/ML
25 INJECTION, SOLUTION INTRAMUSCULAR; INTRAVENOUS
Status: CANCELLED
Start: 2025-06-11

## 2025-06-11 RX ORDER — ACETAMINOPHEN 325 MG/1
650 TABLET ORAL EVERY 4 HOURS PRN
Status: DISCONTINUED | OUTPATIENT
Start: 2025-06-11 | End: 2025-06-14 | Stop reason: HOSPADM

## 2025-06-11 RX ORDER — ENOXAPARIN SODIUM 100 MG/ML
40 INJECTION SUBCUTANEOUS EVERY 24 HOURS
Status: DISCONTINUED | OUTPATIENT
Start: 2025-06-11 | End: 2025-06-14 | Stop reason: HOSPADM

## 2025-06-11 RX ORDER — PROCHLORPERAZINE MALEATE 5 MG/1
5 TABLET ORAL EVERY 6 HOURS PRN
Status: DISCONTINUED | OUTPATIENT
Start: 2025-06-11 | End: 2025-06-14 | Stop reason: HOSPADM

## 2025-06-11 RX ORDER — CYCLOBENZAPRINE HCL 10 MG
10 TABLET ORAL 3 TIMES DAILY PRN
Status: DISCONTINUED | OUTPATIENT
Start: 2025-06-11 | End: 2025-06-14 | Stop reason: HOSPADM

## 2025-06-11 RX ORDER — HEPARIN SODIUM,PORCINE 10 UNIT/ML
5-15 VIAL (ML) INTRAVENOUS
Status: DISCONTINUED | OUTPATIENT
Start: 2025-06-11 | End: 2025-06-14 | Stop reason: HOSPADM

## 2025-06-11 RX ORDER — HYDROXYZINE HYDROCHLORIDE 25 MG/1
25-50 TABLET, FILM COATED ORAL EVERY 6 HOURS PRN
Status: DISCONTINUED | OUTPATIENT
Start: 2025-06-11 | End: 2025-06-14 | Stop reason: HOSPADM

## 2025-06-11 RX ORDER — DEXAMETHASONE 4 MG/1
4 TABLET ORAL EVERY 12 HOURS
Status: CANCELLED
Start: 2025-06-11

## 2025-06-11 RX ADMIN — GABAPENTIN 400 MG: 300 CAPSULE ORAL at 22:29

## 2025-06-11 RX ADMIN — ACETAMINOPHEN 650 MG: 325 TABLET, FILM COATED ORAL at 17:09

## 2025-06-11 RX ADMIN — PANTOPRAZOLE SODIUM 40 MG: 40 TABLET, DELAYED RELEASE ORAL at 17:08

## 2025-06-11 RX ADMIN — ONDANSETRON HYDROCHLORIDE 8 MG: 8 TABLET, FILM COATED ORAL at 18:18

## 2025-06-11 RX ADMIN — FLUTICASONE PROPIONATE 2 SPRAY: 50 SPRAY, METERED NASAL at 22:28

## 2025-06-11 RX ADMIN — PREDNISOLONE ACETATE 2 DROP: 10 SUSPENSION/ DROPS OPHTHALMIC at 18:18

## 2025-06-11 RX ADMIN — GABAPENTIN 400 MG: 300 CAPSULE ORAL at 17:08

## 2025-06-11 RX ADMIN — PREDNISOLONE ACETATE 2 DROP: 10 SUSPENSION/ DROPS OPHTHALMIC at 22:28

## 2025-06-11 RX ADMIN — BUDESONIDE AND FORMOTEROL FUMARATE DIHYDRATE 2 PUFF: 80; 4.5 AEROSOL RESPIRATORY (INHALATION) at 22:29

## 2025-06-11 RX ADMIN — SUCRALFATE 1 G: 1 SUSPENSION ORAL at 17:09

## 2025-06-11 RX ADMIN — Medication 5 ML: at 18:18

## 2025-06-11 RX ADMIN — FAMOTIDINE 20 MG: 20 TABLET, FILM COATED ORAL at 22:29

## 2025-06-11 RX ADMIN — SUCRALFATE 1 G: 1 SUSPENSION ORAL at 22:35

## 2025-06-11 RX ADMIN — LIDOCAINE HYDROCHLORIDE ANHYDROUS 3.5 ML: 10 INJECTION, SOLUTION INFILTRATION at 16:41

## 2025-06-11 RX ADMIN — ACYCLOVIR 400 MG: 400 TABLET ORAL at 22:29

## 2025-06-11 RX ADMIN — DEXAMETHASONE 4 MG: 4 TABLET ORAL at 18:18

## 2025-06-11 RX ADMIN — CYTARABINE 5730 MG: 100 INJECTION, SOLUTION INTRATHECAL; INTRAVENOUS; SUBCUTANEOUS at 18:58

## 2025-06-11 ASSESSMENT — ACTIVITIES OF DAILY LIVING (ADL)
WEAR_GLASSES_OR_BLIND: YES
FALL_HISTORY_WITHIN_LAST_SIX_MONTHS: NO
CHANGE_IN_FUNCTIONAL_STATUS_SINCE_ONSET_OF_CURRENT_ILLNESS/INJURY: NO
ADLS_ACUITY_SCORE: 54
ADLS_ACUITY_SCORE: 54
VISION_MANAGEMENT: GLASSES
EQUIPMENT_CURRENTLY_USED_AT_HOME: CANE, STRAIGHT
HEARING_DIFFICULTY_OR_DEAF: NO
DRESSING/BATHING_DIFFICULTY: NO
DOING_ERRANDS_INDEPENDENTLY_DIFFICULTY: NO
CONCENTRATING,_REMEMBERING_OR_MAKING_DECISIONS_DIFFICULTY: YES
ADLS_ACUITY_SCORE: 36
WALKING_OR_CLIMBING_STAIRS_DIFFICULTY: YES
DIFFICULTY_EATING/SWALLOWING: NO
WALKING_OR_CLIMBING_STAIRS: AMBULATION DIFFICULTY, REQUIRES EQUIPMENT
ADLS_ACUITY_SCORE: 36
ADLS_ACUITY_SCORE: 36
DIFFICULTY_COMMUNICATING: NO
ADLS_ACUITY_SCORE: 36
ADLS_ACUITY_SCORE: 36
TOILETING_ISSUES: NO
ADLS_ACUITY_SCORE: 36

## 2025-06-11 ASSESSMENT — PAIN SCALES - GENERAL: PAINLEVEL_OUTOF10: NO PAIN (0)

## 2025-06-11 NOTE — PROGRESS NOTES
[facility]    HEMATOLOGY & ONCOLOGY  FOLLOW UP    PATIENT NAME: Alejandra Villegas MRN # [id number]  DATE OF VISIT: 06/11/2025 YOB: 1965    REFERRING PROVIDER: Dr. 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 She presents for follow up    PCP Dec 2024 w/ progressive fatigue and nausea where she was found leukopenic WBC 2.4 and neutropenic w/ ANC 0.3. Hgb 12   Referred to local hematology/oncology clinic for further evaluation and seen by Dr. Samina Harris  BMBx 2/10/25 done at OSH showed hypercellular (80-90%) marrow w/ trilineage hematopoiesis w/ dyspoiesis with mildly elevated blasts of 4% on morphology. [facility] pathology read 8% blasts. By WHO 2022 met criteria for AML w/ NPM1 mutation   NGS: NPM1 (37.5), IDH2 (39.2%), and NRAS (16.2%) mutations  CG: Normal  Induction chemotherapy with 7+3 on 3/5/25  D14 BMBx showing no morpohlogic or immunophenotypic evidence of AML  Day 28 BMBx 3/31/25 showing hypercellular (60-70%) with no over dysplasia or increase in blasts. Flow noted 4.3% blasts of unusual phenotype (increased CD7,partial CD56). Blasts were CD34+ while diagnostic flow showing AML that was negative for CD34)  NGS: No mutations identified   C1 HiDAC (3g/m2) on 4/3/25  C2 HiDAC (3g/m2) on 5/6/25  Brief hospitalization 5/19 - 5/20 for febrile neutropenia, recovered quickly and discharged    SUBJECTIVE  Alejandra presents today for consideration of C3 of HiDAC. Overall last cycle went well. She denies any major side effects from chemo other than occasional shakiness which is stable. Was briefly hospitalized for ffevers but recovere quickly once neutropenia resolved. Otherwise no new issues.    Complete ROS was  otherwise negative.       CURRENT OUTPATIENT MEDICATIONS  Current Outpatient Medications   Medication Sig Dispense Refill    acetaminophen (TYLENOL) 325 MG tablet Take 2 tablets (650 mg) by mouth every 4 hours as needed for mild pain. 90 tablet 0    acyclovir (ZOVIRAX) 400 MG tablet Take 1 tablet (400 mg) by mouth 2 times daily. 60 tablet 0    albuterol (PROAIR HFA/PROVENTIL HFA/VENTOLIN HFA) 108 (90 Base) MCG/ACT inhaler Inhale 1-2 puffs into the lungs 4 times daily as needed (Refractory bronchospasm associated with hypersensitivity reaction). 18 g 0    albuterol (PROVENTIL) (2.5 MG/3ML) 0.083% neb solution Take 1 vial (2.5 mg) by nebulization 4 times daily as needed (Refractory bronchospasm associated with hypersensitivity reaction). 90 mL 0    budesonide-formoterol (SYMBICORT) 80-4.5 MCG/ACT Inhaler Inhale 2 puffs into the lungs 2 times daily - Rinse mouth well after use to prevent Thrush 10.2 g 11    calcium carbonate (TUMS) 500 MG chewable tablet Take 1 tablet (500 mg) by mouth 3 times daily as needed for heartburn. 90 tablet 0    cyanocobalamin (VITAMIN B-12) 500 MCG tablet Take 500 mcg by mouth daily.      cyclobenzaprine (FLEXERIL) 10 MG tablet Take 1 tablet (10 mg) by mouth 3 times daily as needed for muscle spasms. 15 tablet 0    famotidine (PEPCID) 20 MG tablet Take 1 tablet (20 mg) by mouth 2 times daily. 60 tablet 0    fluticasone (FLONASE) 50 MCG/ACT nasal spray Spray 2 sprays into both nostrils 2 times daily. 15.8 mL 0    FT TUSSIN DM ADULT  MG/20ML LIQD       gabapentin (NEURONTIN) 400 MG capsule Take 1 capsule (400 mg) by mouth 3 times daily. 90 capsule 0    guaiFENesin (MUCINEX) 600 MG 12 hr tablet Take 2 tablets (1,200 mg) by mouth 2 times daily. 30 tablet 0    guaiFENesin-dextromethorphan (ROBITUSSIN DM) 100-10 MG/5ML syrup Take 5 mLs by mouth every 4 hours as needed for cough. 118 mL 0    hydrOXYzine HCl (ATARAX) 25 MG tablet Take 1-2 tablets (25-50 mg) by mouth every 6 hours as needed for  anxiety or itching (adjuvant pain, sleep). 90 tablet 3    levofloxacin (LEVAQUIN) 750 MG tablet Take 1 tablet (750 mg) by mouth daily. 13 tablet 0    loperamide (IMODIUM) 2 MG capsule Take 2 capsules (4 mg) by mouth 3 times daily as needed for diarrhea. 10 capsule 0    loratadine (CLARITIN) 10 MG tablet Take 1 tablet (10 mg) by mouth daily. 30 tablet 0    ondansetron (ZOFRAN ODT) 4 MG ODT tab Take 1-2 tablets (4-8 mg) by mouth every 8 hours as needed for nausea or vomiting. 45 tablet 0    order for DME Equipment being ordered: home neb set up with mask and tubing 1 Device 0    pantoprazole (PROTONIX) 40 MG EC tablet Take 1 tablet (40 mg) by mouth 2 times daily (before meals). 60 tablet 0    PARoxetine (PAXIL) 20 MG tablet Take 1 tablet (20 mg) by mouth every morning. 30 tablet 0    posaconazole (NOXAFIL) 100 MG DR tablet Take 3 tablets (300 mg) by mouth every morning. 90 tablet 0    potassium chloride ER (K-TAB) 20 MEQ CR tablet Take 20 mEq by mouth daily.      prochlorperazine (COMPAZINE) 5 MG tablet Take 1 tablet (5 mg) by mouth every 6 hours as needed for nausea or vomiting. 30 tablet 0    rosuvastatin (CRESTOR) 20 MG tablet Take 1 tablet (20 mg) by mouth daily. 30 tablet 0    sucralfate (CARAFATE) 1 GM/10ML suspension Take 10 mLs (1 g) by mouth 4 times daily (before meals and nightly). 473 mL 0    SUMAtriptan (IMITREX) 50 MG tablet Take 1 tablet (50 mg) by mouth at onset of headache for migraine. 30 tablet 0    Vitamin D3 (CHOLECALCIFEROL) 25 mcg (1000 units) tablet Take 1 tablet (25 mcg) by mouth daily. 30 tablet 0       ALLERGIES  Allergies   Allergen Reactions    Adhesive Tape     Bee Pollen Swelling     Seasonal    Bee Venom Unknown    Folic Acid Itching    Pollen Extract      Seasonal    Amoxicillin Rash    Chlorhexidine Rash     After several weeks, started to develop rash on R neck down to chest and arm. Also noted on back of knees. Providers suggesting related to CHG as nothing else is new for pt.      Liquid Adhesive Rash    Meloxicam Rash    Nabumetone GI Disturbance     GI upset       REVIEW OF SYSTEMS  A complete ROS was performed and was negative except as mentioned in HPI    PHYSICAL EXAM  BP (!) 153/90 (BP Location: Right arm, Patient Position: Sitting, Cuff Size: Adult Regular)   Pulse 84   Temp 98.1  F (36.7  C) (Oral)   Resp 18   Wt 81.1 kg (178 lb 12.8 oz)   LMP 01/01/2007 (Approximate)   SpO2 96%   BMI 28.00 kg/m    @LASTSAO2(4)@  Wt Readings from Last 3 Encounters:   06/11/25 81.1 kg (178 lb 12.8 oz)   06/06/25 81 kg (178 lb 9.6 oz)   06/04/25 81.3 kg (179 lb 3.2 oz)       Gen: alert, pleasant and conversational, NAD  HEET: NC/AT, EOMI w/ PERRL, anicteric sclera. OP clear. MMM  CV: normal S1,S2 with RRR no m/r/g  Resp: lungs CTA bilaterally with adequate air movement to bases. No wheezes or crackles  Abd: soft NTND no organomegaly or masses. BS normoactive.   Ext: WWP no edema or cyanosis  Skin: no concerning lesions or rashes  Neuro: A&Ox4, no lateralizing sx. Grossly nonfocal.      LABORATORY AND IMAGING STUDIES   Latest Reference Range & Units 06/11/25 12:39   Sodium 135 - 145 mmol/L 140   Potassium 3.4 - 5.3 mmol/L 3.6   Chloride 98 - 107 mmol/L 103   Carbon Dioxide (CO2) 22 - 29 mmol/L 24   Urea Nitrogen 6.0 - 20.0 mg/dL 7.8   Creatinine 0.51 - 0.95 mg/dL 0.62   GFR Estimate >60 mL/min/1.73m2 >90   Calcium 8.8 - 10.4 mg/dL 9.8   Anion Gap 7 - 15 mmol/L 13   Albumin 3.5 - 5.2 g/dL 3.9   Protein Total 6.4 - 8.3 g/dL 7.2   Alkaline Phosphatase 40 - 150 U/L 120   ALT 0 - 50 U/L 10   AST 0 - 45 U/L 21   Bilirubin Total <=1.2 mg/dL 0.4   Glucose 70 - 99 mg/dL 102 (H)   WBC 4.0 - 11.0 10e3/uL 7.5   Hemoglobin 11.7 - 15.7 g/dL 10.6 (L)   Hematocrit 35.0 - 47.0 % 33.8 (L)   Platelet Count 150 - 450 10e3/uL 442   RBC Count 3.80 - 5.20 10e6/uL 3.49 (L)   MCV 78 - 100 fL 97   MCH 26.5 - 33.0 pg 30.4   MCHC 31.5 - 36.5 g/dL 31.4 (L)   RDW 10.0 - 15.0 % 22.8 (H)   % Neutrophils % 59   % Lymphocytes % 9    % Monocytes % 27   % Eosinophils % 3   % Basophils % 2   % Immature Granulocytes % 0   NRBC/W <1 /100 0   Absolute Neutrophil 1.6 - 8.3 10e3/uL 4.4   Absolute Lymphocytes 0.8 - 5.3 10e3/uL 0.7 (L)   Absolute Monocytes 0.0 - 1.3 10e3/uL 2.0 (H)   Absolute Eosinophils 0.0 - 0.7 10e3/uL 0.2   Absolute Basophils 0.0 - 0.2 10e3/uL 0.2   Absolute Immature Granulocytes <=0.4 10e3/uL 0.0   Absolute NRBCs 10e3/uL 0.0   RBC Morphology  Confirmed RBC Indices   Platelet Morphology Automated Count Confirmed. Platelet morphology is normal.  Automated Count Confirmed. Platelet morphology is normal.   Polychromasia None Seen  Slight !   (H): Data is abnormally high  (L): Data is abnormally low  !: Data is abnormal        BMBx 6/6/25  Final Diagnosis   Bone marrow, posterior iliac crest, right decalcified trephine biopsy, touch imprint, particle crush, direct aspirate smear, concentrated aspirate smear, and peripheral blood smear:     -No morphologic or immunophenotypic evidence of acute myeloid leukemia  -Hypercellular marrow for age (cellularity estimated at 60-70%) with trilineage hematopoietic maturation, no overt dysplasia, and no increase in blasts  -Peripheral blood showing slight hypochromic, normocytic anemia; lymphopenia and monocytosis; slight thrombocytosis  -See comment   Electronically signed by Anahi Burns MD on 6/10/2025 at 1214 CDT   Comment  [facility]MAY[facility]   Concurrent flow cytometry ([id number]) showed No increase in myeloid blasts and no abnormal myeloid blast population     Given the genetic profile of leukemia at the time of diagnosis with NPM1, IDH2 and NRAS mutations, correlation with the results of ancillary tests and clinical findings is recommended for further assessment of residual disease.      Concurrent ancillary studies are in progress and will be reported separately.  Correlation with the results of ancillary tests and clinical findings is recommended.     The red blood cell morphology may not be  representative for this patient due to recent red blood cell transfusion.              ASSESSMENT AND PLAN  Alejandra Villegas is a 59 year old female with a past medical history significant for COPD, migraines, rheumatoid arthritis, aortic stenosis, Afib, and anxiety and recent diagnosis of AML w/ NPM1 mutation (25) s/p 7+3 induction and now two cycles of HiDAC consolidation. She presents to [facility] care.    # AML w/ mutated NPM1 in remission - Normal cytogenetics with NPM1, IDH2 and NRAS mutations. MRD negative by NGS on D28 marrow and now s/p 2 cycles of HiDAC consolidation. Her primary oncologist is Dr. Samina Harris at [facility].     She has now tolerated 2 cycles HiDAC well without significant complications other than brief FN. She states she is ready to continue with chemo and we will plan to admit today for C3. We discussed overall plan for hopefully 4 cycles and if remains MRD negative can monitor for releapse peripherally. BMBx MRD analysis pending today.     Ppx - Levofloxacin, Posa, ACV     Final recommendations:  - admit for C3 HiDAC    The longitudinal plan of care for the diagnosis(es)/condition(s) as documented were addressed during this visit. Due to the added complexity in care, I will continue to support Alejandra in the subsequent management and with ongoing continuity of care.    Total time spent on this encounter today including reviewing the EMR, documentation and direct patient care counselin  minutes    Manuel Griffiths MD    Division of Hematology, Oncology and Transplantation  [facility]  P: [phone]

## 2025-06-11 NOTE — LETTER
6/11/2025      Alejandra Villegas  [address]  [place] [address]      Dear Colleague,    Thank you for referring your patient, Alejandra Villegas, to the [facility]. Please see a copy of my visit note below.    [facility]    HEMATOLOGY & ONCOLOGY  FOLLOW UP    PATIENT NAME: Alejandra Villegas MRN # [id number]  DATE OF VISIT: 06/11/2025 YOB: 1965    REFERRING PROVIDER: Dr. 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 She presents for follow up    PCP Dec 2024 w/ progressive fatigue and nausea where she was found leukopenic WBC 2.4 and neutropenic w/ ANC 0.3. Hgb 12   Referred to local hematology/oncology clinic for further evaluation and seen by Dr. Samina Harris  BMBx 2/10/25 done at OSH showed hypercellular (80-90%) marrow w/ trilineage hematopoiesis w/ dyspoiesis with mildly elevated blasts of 4% on morphology. [facility] pathology read 8% blasts. By WHO 2022 met criteria for AML w/ NPM1 mutation   NGS: NPM1 (37.5), IDH2 (39.2%), and NRAS (16.2%) mutations  CG: Normal  Induction chemotherapy with 7+3 on 3/5/25  D14 BMBx showing no morpohlogic or immunophenotypic evidence of AML  Day 28 BMBx 3/31/25 showing hypercellular (60-70%) with no over dysplasia or increase in blasts. Flow noted 4.3% blasts of unusual phenotype (increased CD7,partial CD56). Blasts were CD34+ while diagnostic flow showing AML that was negative for CD34)  NGS: No mutations identified   C1 HiDAC (3g/m2) on 4/3/25  C2 HiDAC (3g/m2) on 5/6/25  Brief hospitalization 5/19 - 5/20 for febrile neutropenia, recovered quickly and discharged    SUBJECTIVE  Alejandra presents today for consideration of C3 of HiDAC. Overall  last cycle went well. She denies any major side effects from chemo other than occasional shakiness which is stable. Was briefly hospitalized for ffevers but recovere quickly once neutropenia resolved. Otherwise no new issues.    Complete ROS was otherwise negative.       CURRENT OUTPATIENT MEDICATIONS  Current Outpatient Medications   Medication Sig Dispense Refill     acetaminophen (TYLENOL) 325 MG tablet Take 2 tablets (650 mg) by mouth every 4 hours as needed for mild pain. 90 tablet 0     acyclovir (ZOVIRAX) 400 MG tablet Take 1 tablet (400 mg) by mouth 2 times daily. 60 tablet 0     albuterol (PROAIR HFA/PROVENTIL HFA/VENTOLIN HFA) 108 (90 Base) MCG/ACT inhaler Inhale 1-2 puffs into the lungs 4 times daily as needed (Refractory bronchospasm associated with hypersensitivity reaction). 18 g 0     albuterol (PROVENTIL) (2.5 MG/3ML) 0.083% neb solution Take 1 vial (2.5 mg) by nebulization 4 times daily as needed (Refractory bronchospasm associated with hypersensitivity reaction). 90 mL 0     budesonide-formoterol (SYMBICORT) 80-4.5 MCG/ACT Inhaler Inhale 2 puffs into the lungs 2 times daily - Rinse mouth well after use to prevent Thrush 10.2 g 11     calcium carbonate (TUMS) 500 MG chewable tablet Take 1 tablet (500 mg) by mouth 3 times daily as needed for heartburn. 90 tablet 0     cyanocobalamin (VITAMIN B-12) 500 MCG tablet Take 500 mcg by mouth daily.       cyclobenzaprine (FLEXERIL) 10 MG tablet Take 1 tablet (10 mg) by mouth 3 times daily as needed for muscle spasms. 15 tablet 0     famotidine (PEPCID) 20 MG tablet Take 1 tablet (20 mg) by mouth 2 times daily. 60 tablet 0     fluticasone (FLONASE) 50 MCG/ACT nasal spray Spray 2 sprays into both nostrils 2 times daily. 15.8 mL 0     FT TUSSIN DM ADULT  MG/20ML LIQD        gabapentin (NEURONTIN) 400 MG capsule Take 1 capsule (400 mg) by mouth 3 times daily. 90 capsule 0     guaiFENesin (MUCINEX) 600 MG 12 hr tablet Take 2 tablets (1,200 mg) by mouth 2  times daily. 30 tablet 0     guaiFENesin-dextromethorphan (ROBITUSSIN DM) 100-10 MG/5ML syrup Take 5 mLs by mouth every 4 hours as needed for cough. 118 mL 0     hydrOXYzine HCl (ATARAX) 25 MG tablet Take 1-2 tablets (25-50 mg) by mouth every 6 hours as needed for anxiety or itching (adjuvant pain, sleep). 90 tablet 3     levofloxacin (LEVAQUIN) 750 MG tablet Take 1 tablet (750 mg) by mouth daily. 13 tablet 0     loperamide (IMODIUM) 2 MG capsule Take 2 capsules (4 mg) by mouth 3 times daily as needed for diarrhea. 10 capsule 0     loratadine (CLARITIN) 10 MG tablet Take 1 tablet (10 mg) by mouth daily. 30 tablet 0     ondansetron (ZOFRAN ODT) 4 MG ODT tab Take 1-2 tablets (4-8 mg) by mouth every 8 hours as needed for nausea or vomiting. 45 tablet 0     order for DME Equipment being ordered: home neb set up with mask and tubing 1 Device 0     pantoprazole (PROTONIX) 40 MG EC tablet Take 1 tablet (40 mg) by mouth 2 times daily (before meals). 60 tablet 0     PARoxetine (PAXIL) 20 MG tablet Take 1 tablet (20 mg) by mouth every morning. 30 tablet 0     posaconazole (NOXAFIL) 100 MG DR tablet Take 3 tablets (300 mg) by mouth every morning. 90 tablet 0     potassium chloride ER (K-TAB) 20 MEQ CR tablet Take 20 mEq by mouth daily.       prochlorperazine (COMPAZINE) 5 MG tablet Take 1 tablet (5 mg) by mouth every 6 hours as needed for nausea or vomiting. 30 tablet 0     rosuvastatin (CRESTOR) 20 MG tablet Take 1 tablet (20 mg) by mouth daily. 30 tablet 0     sucralfate (CARAFATE) 1 GM/10ML suspension Take 10 mLs (1 g) by mouth 4 times daily (before meals and nightly). 473 mL 0     SUMAtriptan (IMITREX) 50 MG tablet Take 1 tablet (50 mg) by mouth at onset of headache for migraine. 30 tablet 0     Vitamin D3 (CHOLECALCIFEROL) 25 mcg (1000 units) tablet Take 1 tablet (25 mcg) by mouth daily. 30 tablet 0       ALLERGIES  Allergies   Allergen Reactions     Adhesive Tape      Bee Pollen Swelling     Seasonal     Bee Venom  Unknown     Folic Acid Itching     Pollen Extract      Seasonal     Amoxicillin Rash     Chlorhexidine Rash     After several weeks, started to develop rash on R neck down to chest and arm. Also noted on back of knees. Providers suggesting related to CHG as nothing else is new for pt.      Liquid Adhesive Rash     Meloxicam Rash     Nabumetone GI Disturbance     GI upset       REVIEW OF SYSTEMS  A complete ROS was performed and was negative except as mentioned in HPI    PHYSICAL EXAM  BP (!) 153/90 (BP Location: Right arm, Patient Position: Sitting, Cuff Size: Adult Regular)   Pulse 84   Temp 98.1  F (36.7  C) (Oral)   Resp 18   Wt 81.1 kg (178 lb 12.8 oz)   LMP 01/01/2007 (Approximate)   SpO2 96%   BMI 28.00 kg/m    @LASTSAO2(4)@  Wt Readings from Last 3 Encounters:   06/11/25 81.1 kg (178 lb 12.8 oz)   06/06/25 81 kg (178 lb 9.6 oz)   06/04/25 81.3 kg (179 lb 3.2 oz)       Gen: alert, pleasant and conversational, NAD  HEET: NC/AT, EOMI w/ PERRL, anicteric sclera. OP clear. MMM  CV: normal S1,S2 with RRR no m/r/g  Resp: lungs CTA bilaterally with adequate air movement to bases. No wheezes or crackles  Abd: soft NTND no organomegaly or masses. BS normoactive.   Ext: WWP no edema or cyanosis  Skin: no concerning lesions or rashes  Neuro: A&Ox4, no lateralizing sx. Grossly nonfocal.      LABORATORY AND IMAGING STUDIES   Latest Reference Range & Units 06/11/25 12:39   Sodium 135 - 145 mmol/L 140   Potassium 3.4 - 5.3 mmol/L 3.6   Chloride 98 - 107 mmol/L 103   Carbon Dioxide (CO2) 22 - 29 mmol/L 24   Urea Nitrogen 6.0 - 20.0 mg/dL 7.8   Creatinine 0.51 - 0.95 mg/dL 0.62   GFR Estimate >60 mL/min/1.73m2 >90   Calcium 8.8 - 10.4 mg/dL 9.8   Anion Gap 7 - 15 mmol/L 13   Albumin 3.5 - 5.2 g/dL 3.9   Protein Total 6.4 - 8.3 g/dL 7.2   Alkaline Phosphatase 40 - 150 U/L 120   ALT 0 - 50 U/L 10   AST 0 - 45 U/L 21   Bilirubin Total <=1.2 mg/dL 0.4   Glucose 70 - 99 mg/dL 102 (H)   WBC 4.0 - 11.0 10e3/uL 7.5   Hemoglobin  11.7 - 15.7 g/dL 10.6 (L)   Hematocrit 35.0 - 47.0 % 33.8 (L)   Platelet Count 150 - 450 10e3/uL 442   RBC Count 3.80 - 5.20 10e6/uL 3.49 (L)   MCV 78 - 100 fL 97   MCH 26.5 - 33.0 pg 30.4   MCHC 31.5 - 36.5 g/dL 31.4 (L)   RDW 10.0 - 15.0 % 22.8 (H)   % Neutrophils % 59   % Lymphocytes % 9   % Monocytes % 27   % Eosinophils % 3   % Basophils % 2   % Immature Granulocytes % 0   NRBC/W <1 /100 0   Absolute Neutrophil 1.6 - 8.3 10e3/uL 4.4   Absolute Lymphocytes 0.8 - 5.3 10e3/uL 0.7 (L)   Absolute Monocytes 0.0 - 1.3 10e3/uL 2.0 (H)   Absolute Eosinophils 0.0 - 0.7 10e3/uL 0.2   Absolute Basophils 0.0 - 0.2 10e3/uL 0.2   Absolute Immature Granulocytes <=0.4 10e3/uL 0.0   Absolute NRBCs 10e3/uL 0.0   RBC Morphology  Confirmed RBC Indices   Platelet Morphology Automated Count Confirmed. Platelet morphology is normal.  Automated Count Confirmed. Platelet morphology is normal.   Polychromasia None Seen  Slight !   (H): Data is abnormally high  (L): Data is abnormally low  !: Data is abnormal        BMBx 6/6/25  Final Diagnosis   Bone marrow, posterior iliac crest, right decalcified trephine biopsy, touch imprint, particle crush, direct aspirate smear, concentrated aspirate smear, and peripheral blood smear:     -No morphologic or immunophenotypic evidence of acute myeloid leukemia  -Hypercellular marrow for age (cellularity estimated at 60-70%) with trilineage hematopoietic maturation, no overt dysplasia, and no increase in blasts  -Peripheral blood showing slight hypochromic, normocytic anemia; lymphopenia and monocytosis; slight thrombocytosis  -See comment   Electronically signed by Anahi Burns MD on 6/10/2025 at 1214 CDT   Comment  [facility]MA[facility]   Concurrent flow cytometry ([id number]) showed No increase in myeloid blasts and no abnormal myeloid blast population     Given the genetic profile of leukemia at the time of diagnosis with NPM1, IDH2 and NRAS mutations, correlation with the results of ancillary tests and  clinical findings is recommended for further assessment of residual disease.      Concurrent ancillary studies are in progress and will be reported separately.  Correlation with the results of ancillary tests and clinical findings is recommended.     The red blood cell morphology may not be representative for this patient due to recent red blood cell transfusion.              ASSESSMENT AND PLAN  Alejandra Villegas is a 59 year old female with a past medical history significant for COPD, migraines, rheumatoid arthritis, aortic stenosis, Afib, and anxiety and recent diagnosis of AML w/ NPM1 mutation (25) s/p 7+3 induction and now two cycles of HiDAC consolidation. She presents to [facility].    # AML w/ mutated NPM1 in remission - Normal cytogenetics with NPM1, IDH2 and NRAS mutations. MRD negative by NGS on D28 marrow and now s/p 2 cycles of HiDAC consolidation. Her primary oncologist is Dr. Samina Harris at [facility].     She has now tolerated 2 cycles HiDAC well without significant complications other than brief FN. She states she is ready to continue with chemo and we will plan to admit today for C3. We discussed overall plan for hopefully 4 cycles and if remains MRD negative can monitor for releapse peripherally. BMBx MRD analysis pending today.     Ppx - Levofloxacin, Posa, ACV     Final recommendations:  - admit for C3 HiDAC    The longitudinal plan of care for the diagnosis(es)/condition(s) as documented were addressed during this visit. Due to the added complexity in care, I will continue to support Alejandra in the subsequent management and with ongoing continuity of care.    Total time spent on this encounter today including reviewing the EMR, documentation and direct patient care counselin  minutes    Manuel Griffiths MD    Division of Hematology, Oncology and Transplantation  [facility]  P: [phone]        Again, thank you for allowing me to participate  in the care of your patient.        Sincerely,        Manuel Griffiths MD    Electronically signed

## 2025-06-11 NOTE — PROCEDURES
[facility]    Double Lumen PICC Placement    Date/Time: 6/11/2025 4:49 PM    Performed by: Nery Crowell PA-C  Authorized by: Britton Jaimes MD  Indications: vascular access      UNIVERSAL PROTOCOL   Site Marked: Yes  Prior Images Obtained and Reviewed:  Yes  Required items: Required blood products, implants, devices and special equipment available    Patient identity confirmed:  Verbally with patient, arm band, provided demographic data and hospital-assigned identification number  Patient was reevaluated immediately before administering moderate or deep sedation or anesthesia  Confirmation Checklist:  Patient's identity using two indicators, relevant allergies, procedure was appropriate and matched the consent or emergent situation and correct equipment/implants were available  Time out: Immediately prior to the procedure a time out was called    Universal Protocol: the [facility] Universal Protocol was followed    Preparation: Patient was prepped and draped in usual sterile fashion       ANESTHESIA    Anesthesia:  See MAR for details  Local Anesthetic:  Lidocaine 1% without epinephrine  Anesthetic Total (mL):  3.5      SEDATION    Patient Sedated: No        Preparation: skin prepped with ChloraPrep  Skin prep agent: skin prep agent completely dried prior to procedure  Sterile barriers: maximum sterile barriers were used: cap, mask, sterile gown, sterile gloves, and large sterile sheet  Hand hygiene: hand hygiene performed prior to central venous catheter insertion  Type of line used: PICC  Catheter type: double lumen  Lumen type: non-valved and power PICC  Lumen Identification: Purple and Red  Catheter size: 5 Fr  Brand: Bard  Lot number: [id number]  Placement method: venipuncture, MST, ultrasound and tip navigation system  Difficulty threading catheter: no  Successful placement: yes  Orientation: left  Catheter to Vein (%): 24  Location: brachial vein  (medial) (0.72 cm vein diameter)  Tip Location: SVC  Site rationale: per patient's request  Arm circumference: adults 10 cm  Extremity circumference: 31  Visible catheter length: 2  Total catheter length: 47  Dressing and securement: adhesive securement device, gloves changed prior to final dressing, dressing applied, alcohol impregnated caps, blood cleaned with CHG, chlorhexidine disc applied, site cleansed, statlock, sterile dressing applied and transparent dressing  Post procedure assessment: blood return through all ports, placement verified by 3CG technology and free fluid flow  PROCEDURE Describe Procedure: PICC was verified, ready to use.  Disposal: sharps and needle count correct at the end of procedure, needles and guidewire disposed in sharps container  Patient Tolerance:  Patient tolerated the procedure well with no immediate complications       Media Information      Document Information    Other: Photograph   PICC 3CG tip confirmation   06/11/2025 4:48 PM   Attached To:   Hospital Encounter on 6/11/25   Source Information    Shama Chatterjee, RN  [facility]   Document History

## 2025-06-11 NOTE — PROGRESS NOTES
Nursing Focus: Admission    D: Patient admitted from home for chemo. Patient has a history of AML.     I: Upon arrival to the unit patient was oriented to room, unit, and call light. Patient s height, weight, and vital signs were obtained. Allergies reviewed and allergy band applied. MD notified of patient s arrival on the unit. Adult AVS completed. Head to toe assessment completed. Full skin assessment completed. Education assessment completed. Care plan initiated.    A: Vital signs stable upon admission. Patient denies pain at this time. Patient s skin intact.    P: Continue to monitor patient and intervene as needed. Continue with plan of care. Notify MD with any concerns or changes in patient status.

## 2025-06-11 NOTE — NURSING NOTE
Chief Complaint   Patient presents with    Blood Draw     Labs drawn via  by RN in lab, vitals taken.       Labs collected from venipuncture by RN. Vitals taken. Checked in for appointment(s).    Vanna Rodríguez RN

## 2025-06-11 NOTE — LETTER
Transition Communication Hand-off for Care Transitions to Next Level of Care Provider    Name: Alejandra Villegas  : 1965  MRN #: [id number]  Primary Care Provider: Physician No Ref-Primary     Primary Clinic: No address on file     Reason for Hospitalization:  AML (acute myeloblastic leukemia) (H) [C92.00]  Acute myeloid leukemia (H) [C92.00]  Admit Date/Time: 2025  3:25 PM  Discharge Date: ***  Payor Source: Payor: MEDICARE / Plan: MEDICARE / Product Type: Medicare /     Readmission Assessment Measure (ALICIA) Risk Score/category: ***    Plan of Care Goals/Milestone Events:   Patient Concerns: ***   Patient Goals:   Short-term ***   Long-term ***   Medical Goals   Short-term ***   Long-term ***         Reason for Communication Hand-off Referral: {CCREASONCMCTNHANDOFFREFERRALCTS:03415}    Discharge Plan:       Concern for non-adherence with plan of care:   Y/N ***  Discharge Needs Assessment:  Needs      Flowsheet Row Most Recent Value   Equipment Currently Used at Home cane, straight            Already enrolled in Tele-monitoring program and name of program:  ***  Follow-up specialty is recommended: {YES / NO:03561}    Follow-up plan:    Future Appointments   Date Time Provider Department Center   2025  1:00 PM GH LAB [facility] [place]   2025  2:30 PM GH INFUSION CHAIR 2 [facility] [place]   2025  4:00 PM Bon Lezama DO [facility] [place]   2025  1:00 PM [facility] [place]   2025 12:30 PM Consuelo Huff, APRN CNP [facility]   2025  1:40 PM GH LAB [facility] [place]   2025  1:10 PM [facility] LAB [facility] [place]   2025  1:10 PM [facility] LAB [facility] [place]   2025  1:00 PM GH LAB [facility] [place]   2025 12:40 PM [facility] LAB [facility] [place]   7/3/2025 12:30 PM GH LAB [facility] [place]   2025 12:40 PM [facility] [place]       Any outstanding tests or procedures:              Key Recommendations:      Jyoti Patten,  RN    AVS/Discharge Summary is the source of truth; this is a helpful guide for improved communication of patient story

## 2025-06-11 NOTE — PHARMACY-ADMISSION MEDICATION HISTORY
Pharmacy Intern Admission Medication History    Admission medication history is complete. The information provided in this note is only as accurate as the sources available at the time of the update.    Information Source(s): Patient and CareEverywhere/SureScripts via in-person    Pertinent Information:   Obtained medication from patient who was a good historian.   Patient reports that she took all her morning doses for her scheduled medication today on 6/11/2025.   Per patient, she has finished supply of acyclovir tablet (30 days supply). Last dose was on 6/9/2025. Patient was also on levofloxacin 750 mg tablet for 13 days which was dispensed on 5/20/25.   Sucralfate suspension: patient reports to still be taking this medication three times daily. Per dispense report, she was dispensed 10 days supply on 5/9/2025. Left on PTA med list as patient reported taking this medication and having another bottle at home.     Changes made to PTA medication list:  Added: None  Deleted:   Per patient, she's no longer taking the following medications:   Acyclovir 400 mg tablet  FT TUSSIN DM ADULT  MG/20 mL liquid   ROBITUSSIN -10 mg/5 mL syrup   MUCINEX 600 mg 12 hr tablet   Levofloxacin 750 mg tablet   Loperamide 2 mg capsule   Changed:   sucralfate (CARAFATE) 1 GM/10ML suspension: Take 10 mLs by mouth 4 times daily --> Take 10 mLs by mouth 3 times daily.    Allergies reviewed with patient and updates made in EHR: yes    Medication History Completed By: Jacky Daniels 6/11/2025 6:00 PM    PTA Med List   Medication Sig Last Dose/Taking    acetaminophen (TYLENOL) 325 MG tablet Take 2 tablets (650 mg) by mouth every 4 hours as needed for mild pain. Taking As Needed    albuterol (PROAIR HFA/PROVENTIL HFA/VENTOLIN HFA) 108 (90 Base) MCG/ACT inhaler Inhale 1-2 puffs into the lungs 4 times daily as needed (Refractory bronchospasm associated with hypersensitivity reaction). Taking As Needed    albuterol (PROVENTIL) (2.5  MG/3ML) 0.083% neb solution Take 1 vial (2.5 mg) by nebulization 4 times daily as needed (Refractory bronchospasm associated with hypersensitivity reaction). Taking As Needed    budesonide-formoterol (SYMBICORT) 80-4.5 MCG/ACT Inhaler Inhale 2 puffs into the lungs 2 times daily - Rinse mouth well after use to prevent Thrush 6/11/2025    calcium carbonate (TUMS) 500 MG chewable tablet Take 1 tablet (500 mg) by mouth 3 times daily as needed for heartburn. Taking As Needed    cyanocobalamin (VITAMIN B-12) 500 MCG tablet Take 500 mcg by mouth daily. Taking    cyclobenzaprine (FLEXERIL) 10 MG tablet Take 1 tablet (10 mg) by mouth 3 times daily as needed for muscle spasms. Taking As Needed    famotidine (PEPCID) 20 MG tablet Take 1 tablet (20 mg) by mouth 2 times daily. 6/11/2025    fluticasone (FLONASE) 50 MCG/ACT nasal spray Spray 2 sprays into both nostrils 2 times daily. 6/11/2025    gabapentin (NEURONTIN) 400 MG capsule Take 1 capsule (400 mg) by mouth 3 times daily. 6/11/2025    hydrOXYzine HCl (ATARAX) 25 MG tablet Take 1-2 tablets (25-50 mg) by mouth every 6 hours as needed for anxiety or itching (adjuvant pain, sleep). Past Month    loratadine (CLARITIN) 10 MG tablet Take 1 tablet (10 mg) by mouth daily. Taking    ondansetron (ZOFRAN ODT) 4 MG ODT tab Take 1-2 tablets (4-8 mg) by mouth every 8 hours as needed for nausea or vomiting. Taking As Needed    pantoprazole (PROTONIX) 40 MG EC tablet Take 1 tablet (40 mg) by mouth 2 times daily (before meals). 6/11/2025    PARoxetine (PAXIL) 20 MG tablet Take 1 tablet (20 mg) by mouth every morning. 6/11/2025    posaconazole (NOXAFIL) 100 MG DR tablet Take 3 tablets (300 mg) by mouth every morning. 6/11/2025    potassium chloride ER (K-TAB) 20 MEQ CR tablet Take 20 mEq by mouth daily. Taking    prochlorperazine (COMPAZINE) 5 MG tablet Take 1 tablet (5 mg) by mouth every 6 hours as needed for nausea or vomiting. More than a month    rosuvastatin (CRESTOR) 20 MG tablet Take  1 tablet (20 mg) by mouth daily. Taking    sucralfate (CARAFATE) 1 GM/10ML suspension Take 10 mLs by mouth 3 times daily. 6/11/2025    SUMAtriptan (IMITREX) 50 MG tablet Take 1 tablet (50 mg) by mouth at onset of headache for migraine. More than a month    Vitamin D3 (CHOLECALCIFEROL) 25 mcg (1000 units) tablet Take 1 tablet (25 mcg) by mouth daily. Taking

## 2025-06-11 NOTE — NURSING NOTE
"Oncology Rooming Note    June 11, 2025 1:03 PM   Alejandra Villegas is a 59 year old female who presents for:    Chief Complaint   Patient presents with    Blood Draw     Labs drawn via  by RN in lab, vitals taken.     Oncology Clinic Visit     Acute myeloid leukemia      Initial Vitals: BP (!) 153/90 (BP Location: Right arm, Patient Position: Sitting, Cuff Size: Adult Regular)   Pulse 84   Temp 98.1  F (36.7  C) (Oral)   Resp 18   Wt 81.1 kg (178 lb 12.8 oz)   LMP 01/01/2007 (Approximate)   SpO2 96%   BMI 28.00 kg/m   Estimated body mass index is 28 kg/m  as calculated from the following:    Height as of 5/18/25: 1.702 m (5' 7\").    Weight as of this encounter: 81.1 kg (178 lb 12.8 oz). Body surface area is 1.96 meters squared.  No Pain (0) Comment: Data Unavailable   Patient's last menstrual period was 01/01/2007 (approximate).  Allergies reviewed: Yes  Medications reviewed: Yes    Medications: MEDICATION REFILLS NEEDED TODAY. Provider was notified.  Pharmacy name entered into EPIC:    [id number] - [place], [place] - [address] [id number] - [place], [place] - [address]    Frailty Screening:   Is the patient here for a new oncology consult visit in cancer care? 2. No    PHQ9:  Did this patient require a PHQ9?: No      Clinical concerns: Patient requests refills on all medications  Dr. Griffiths was notified via message.      Lore Ramirez              "

## 2025-06-12 ENCOUNTER — RESULTS FOLLOW-UP (OUTPATIENT)
Dept: ONCOLOGY | Facility: OTHER | Age: 60
End: 2025-06-12

## 2025-06-12 VITALS
WEIGHT: 177.4 LBS | TEMPERATURE: 97.4 F | DIASTOLIC BLOOD PRESSURE: 77 MMHG | BODY MASS INDEX: 27.78 KG/M2 | RESPIRATION RATE: 18 BRPM | SYSTOLIC BLOOD PRESSURE: 139 MMHG | OXYGEN SATURATION: 92 % | HEART RATE: 85 BPM

## 2025-06-12 LAB
ALBUMIN SERPL BCG-MCNC: 3.7 G/DL (ref 3.5–5.2)
ALP SERPL-CCNC: 105 U/L (ref 40–150)
ALT SERPL W P-5'-P-CCNC: 11 U/L (ref 0–50)
ANION GAP SERPL CALCULATED.3IONS-SCNC: 11 MMOL/L (ref 7–15)
AST SERPL W P-5'-P-CCNC: 22 U/L (ref 0–45)
BASOPHILS # BLD AUTO: 0 10E3/UL (ref 0–0.2)
BASOPHILS NFR BLD AUTO: 1 %
BILIRUB SERPL-MCNC: 0.3 MG/DL
BUN SERPL-MCNC: 13.6 MG/DL (ref 6–20)
CALCIUM SERPL-MCNC: 9.7 MG/DL (ref 8.8–10.4)
CHLORIDE SERPL-SCNC: 104 MMOL/L (ref 98–107)
CREAT SERPL-MCNC: 0.6 MG/DL (ref 0.51–0.95)
EGFRCR SERPLBLD CKD-EPI 2021: >90 ML/MIN/1.73M2
EOSINOPHIL # BLD AUTO: 0 10E3/UL (ref 0–0.7)
EOSINOPHIL NFR BLD AUTO: 0 %
ERYTHROCYTE [DISTWIDTH] IN BLOOD BY AUTOMATED COUNT: 22.3 % (ref 10–15)
GLUCOSE SERPL-MCNC: 156 MG/DL (ref 70–99)
HCO3 SERPL-SCNC: 26 MMOL/L (ref 22–29)
HCT VFR BLD AUTO: 33.7 % (ref 35–47)
HGB BLD-MCNC: 10.4 G/DL (ref 11.7–15.7)
IMM GRANULOCYTES # BLD: 0 10E3/UL
IMM GRANULOCYTES NFR BLD: 0 %
LYMPHOCYTES # BLD AUTO: 0.2 10E3/UL (ref 0.8–5.3)
LYMPHOCYTES NFR BLD AUTO: 3 %
MAGNESIUM SERPL-MCNC: 2.2 MG/DL (ref 1.7–2.3)
MCH RBC QN AUTO: 30.9 PG (ref 26.5–33)
MCHC RBC AUTO-ENTMCNC: 30.9 G/DL (ref 31.5–36.5)
MCV RBC AUTO: 100 FL (ref 78–100)
MONOCYTES # BLD AUTO: 0.5 10E3/UL (ref 0–1.3)
MONOCYTES NFR BLD AUTO: 10 %
NEUTROPHILS # BLD AUTO: 4.3 10E3/UL (ref 1.6–8.3)
NEUTROPHILS NFR BLD AUTO: 86 %
NRBC # BLD AUTO: 0 10E3/UL
NRBC BLD AUTO-RTO: 0 /100
PHOSPHATE SERPL-MCNC: 4.7 MG/DL (ref 2.5–4.5)
PLATELET # BLD AUTO: 395 10E3/UL (ref 150–450)
POTASSIUM SERPL-SCNC: 4 MMOL/L (ref 3.4–5.3)
PROT SERPL-MCNC: 6.7 G/DL (ref 6.4–8.3)
RBC # BLD AUTO: 3.37 10E6/UL (ref 3.8–5.2)
SODIUM SERPL-SCNC: 141 MMOL/L (ref 135–145)
URATE SERPL-MCNC: 6.5 MG/DL (ref 2.4–5.7)
WBC # BLD AUTO: 5 10E3/UL (ref 4–11)

## 2025-06-12 PROCEDURE — 250N000011 HC RX IP 250 OP 636: Performed by: STUDENT IN AN ORGANIZED HEALTH CARE EDUCATION/TRAINING PROGRAM

## 2025-06-12 PROCEDURE — 84550 ASSAY OF BLOOD/URIC ACID: CPT

## 2025-06-12 PROCEDURE — 120N000002 HC R&B MED SURG/OB UMMC

## 2025-06-12 PROCEDURE — 250N000013 HC RX MED GY IP 250 OP 250 PS 637

## 2025-06-12 PROCEDURE — 84100 ASSAY OF PHOSPHORUS: CPT

## 2025-06-12 PROCEDURE — 83735 ASSAY OF MAGNESIUM: CPT

## 2025-06-12 PROCEDURE — 250N000012 HC RX MED GY IP 250 OP 636 PS 637: Performed by: STUDENT IN AN ORGANIZED HEALTH CARE EDUCATION/TRAINING PROGRAM

## 2025-06-12 PROCEDURE — 999N000007 HC SITE CHECK

## 2025-06-12 PROCEDURE — 82310 ASSAY OF CALCIUM: CPT

## 2025-06-12 PROCEDURE — 99233 SBSQ HOSP IP/OBS HIGH 50: CPT | Mod: FS

## 2025-06-12 PROCEDURE — 250N000011 HC RX IP 250 OP 636

## 2025-06-12 PROCEDURE — 258N000003 HC RX IP 258 OP 636: Performed by: STUDENT IN AN ORGANIZED HEALTH CARE EDUCATION/TRAINING PROGRAM

## 2025-06-12 PROCEDURE — 85004 AUTOMATED DIFF WBC COUNT: CPT

## 2025-06-12 PROCEDURE — 999N000202 HC STATISTICAL VASC ACCESS NURSE TIME, 1-15 MINUTES

## 2025-06-12 RX ORDER — ALLOPURINOL 300 MG/1
300 TABLET ORAL DAILY
Status: DISCONTINUED | OUTPATIENT
Start: 2025-06-12 | End: 2025-06-14 | Stop reason: HOSPADM

## 2025-06-12 RX ORDER — LISINOPRIL 10 MG/1
10 TABLET ORAL DAILY
Status: DISCONTINUED | OUTPATIENT
Start: 2025-06-12 | End: 2025-06-14 | Stop reason: HOSPADM

## 2025-06-12 RX ADMIN — FAMOTIDINE 20 MG: 20 TABLET, FILM COATED ORAL at 10:25

## 2025-06-12 RX ADMIN — BUDESONIDE AND FORMOTEROL FUMARATE DIHYDRATE 2 PUFF: 80; 4.5 AEROSOL RESPIRATORY (INHALATION) at 20:19

## 2025-06-12 RX ADMIN — Medication 5 ML: at 10:21

## 2025-06-12 RX ADMIN — PREDNISOLONE ACETATE 2 DROP: 10 SUSPENSION/ DROPS OPHTHALMIC at 14:17

## 2025-06-12 RX ADMIN — DEXAMETHASONE 4 MG: 4 TABLET ORAL at 05:46

## 2025-06-12 RX ADMIN — ACYCLOVIR 400 MG: 400 TABLET ORAL at 10:26

## 2025-06-12 RX ADMIN — Medication 5 ML: at 23:44

## 2025-06-12 RX ADMIN — PREDNISOLONE ACETATE 2 DROP: 10 SUSPENSION/ DROPS OPHTHALMIC at 10:32

## 2025-06-12 RX ADMIN — Medication 25 MCG: at 10:25

## 2025-06-12 RX ADMIN — CYTARABINE 5730 MG: 100 INJECTION, SOLUTION INTRATHECAL; INTRAVENOUS; SUBCUTANEOUS at 06:24

## 2025-06-12 RX ADMIN — SUCRALFATE 1 G: 1 SUSPENSION ORAL at 10:26

## 2025-06-12 RX ADMIN — SUCRALFATE 1 G: 1 SUSPENSION ORAL at 23:44

## 2025-06-12 RX ADMIN — LISINOPRIL 10 MG: 10 TABLET ORAL at 14:16

## 2025-06-12 RX ADMIN — PANTOPRAZOLE SODIUM 40 MG: 40 TABLET, DELAYED RELEASE ORAL at 15:40

## 2025-06-12 RX ADMIN — DEXAMETHASONE 4 MG: 4 TABLET ORAL at 18:02

## 2025-06-12 RX ADMIN — GABAPENTIN 400 MG: 300 CAPSULE ORAL at 15:40

## 2025-06-12 RX ADMIN — PAROXETINE HYDROCHLORIDE 20 MG: 20 TABLET, FILM COATED ORAL at 10:31

## 2025-06-12 RX ADMIN — ONDANSETRON HYDROCHLORIDE 8 MG: 8 TABLET, FILM COATED ORAL at 18:02

## 2025-06-12 RX ADMIN — PREDNISOLONE ACETATE 2 DROP: 10 SUSPENSION/ DROPS OPHTHALMIC at 20:19

## 2025-06-12 RX ADMIN — PANTOPRAZOLE SODIUM 40 MG: 40 TABLET, DELAYED RELEASE ORAL at 10:25

## 2025-06-12 RX ADMIN — FLUTICASONE PROPIONATE 2 SPRAY: 50 SPRAY, METERED NASAL at 20:19

## 2025-06-12 RX ADMIN — CYTARABINE 5730 MG: 100 INJECTION, SOLUTION INTRATHECAL; INTRAVENOUS; SUBCUTANEOUS at 18:46

## 2025-06-12 RX ADMIN — LORATADINE 10 MG: 10 TABLET ORAL at 10:25

## 2025-06-12 RX ADMIN — SUCRALFATE 1 G: 1 SUSPENSION ORAL at 15:40

## 2025-06-12 RX ADMIN — ONDANSETRON HYDROCHLORIDE 8 MG: 8 TABLET, FILM COATED ORAL at 05:46

## 2025-06-12 RX ADMIN — GABAPENTIN 400 MG: 300 CAPSULE ORAL at 10:24

## 2025-06-12 RX ADMIN — ENOXAPARIN SODIUM 40 MG: 40 INJECTION SUBCUTANEOUS at 20:19

## 2025-06-12 RX ADMIN — CYANOCOBALAMIN TAB 500 MCG 500 MCG: 500 TAB at 10:24

## 2025-06-12 RX ADMIN — FAMOTIDINE 20 MG: 20 TABLET, FILM COATED ORAL at 20:19

## 2025-06-12 RX ADMIN — GABAPENTIN 400 MG: 300 CAPSULE ORAL at 20:19

## 2025-06-12 RX ADMIN — ALLOPURINOL 300 MG: 300 TABLET ORAL at 14:16

## 2025-06-12 RX ADMIN — FLUTICASONE PROPIONATE 2 SPRAY: 50 SPRAY, METERED NASAL at 10:31

## 2025-06-12 RX ADMIN — ACETAMINOPHEN 650 MG: 325 TABLET, FILM COATED ORAL at 15:40

## 2025-06-12 RX ADMIN — POSACONAZOLE 300 MG: 100 TABLET, DELAYED RELEASE ORAL at 10:26

## 2025-06-12 RX ADMIN — BUDESONIDE AND FORMOTEROL FUMARATE DIHYDRATE 2 PUFF: 80; 4.5 AEROSOL RESPIRATORY (INHALATION) at 10:31

## 2025-06-12 RX ADMIN — Medication 5 ML: at 15:41

## 2025-06-12 RX ADMIN — ACYCLOVIR 400 MG: 400 TABLET ORAL at 20:19

## 2025-06-12 RX ADMIN — Medication 5 ML: at 05:46

## 2025-06-12 ASSESSMENT — ACTIVITIES OF DAILY LIVING (ADL)
ADLS_ACUITY_SCORE: 45
ADLS_ACUITY_SCORE: 36
ADLS_ACUITY_SCORE: 38
ADLS_ACUITY_SCORE: 36
ADLS_ACUITY_SCORE: 38
ADLS_ACUITY_SCORE: 36
ADLS_ACUITY_SCORE: 38
ADLS_ACUITY_SCORE: 36
ADLS_ACUITY_SCORE: 45
ADLS_ACUITY_SCORE: 36
ADLS_ACUITY_SCORE: 38
ADLS_ACUITY_SCORE: 38
ADLS_ACUITY_SCORE: 45
ADLS_ACUITY_SCORE: 36
ADLS_ACUITY_SCORE: 38
ADLS_ACUITY_SCORE: 36

## 2025-06-12 NOTE — PLAN OF CARE
Goal Outcome Evaluation: No change- plan discussed with patient       /79 (BP Location: Right arm)   Pulse 79   Temp 97.7  F (36.5  C) (Oral)   Resp 18   Wt 80.8 kg (178 lb 1.6 oz)   LMP 01/01/2007 (Approximate)   SpO2 92%   BMI 27.89 kg/m      Neuro: A&Ox4.   Cardiac: Afebrile, VSS.   Respiratory: desat on ra during sleep to 88-90. O2 sats 92-96 on 2 lpm nc during sleep  GI/: Voiding spontaneously. No BM this shift.   Diet/appetite: Tolerating diet. Denies nausea   Activity: Up independently. Ambulating off unit    Pain: . Denies   Skin: No new deficits noted.  Lines: Dbl lumen PICC. Hep locked. + blood return noted on red lumen- labs drawn. Hep locked  Replacement: RN managed replacement/Conditional transfusion orders in place. Am labs reviewed- no replacements or transfusions needed. Lab rechecks ordered per protocols in place.  Plan: chemo premeds admin- chemo to be admin by Chemo certified RN    Rested [facility]. Able to make needs known. Continue to monitor. Notify MD of changes/concerns      Problem: Adult Inpatient Plan of Care  Goal: Optimal Comfort and Wellbeing  Outcome: Not Progressing     Problem: Adult Inpatient Plan of Care  Goal: Readiness for Transition of Care  Outcome: Not Progressing

## 2025-06-12 NOTE — PLAN OF CARE
Goal Outcome Evaluation:      Plan of Care Reviewed With: patient          Outcome Evaluation: Cycle 3 Dose 2 HiDAC completed this shift    Patient feeling well today; no complaints of pain or nausea. Patient has a great appetite today. Patient out to smoke a few times this shift. No blood products or electrolytes needed this shift. Patient in good spirits today.     How Severe Are Your Spot(S)?: mild Have Your Spot(S) Been Treated In The Past?: has not been treated Hpi Title: Evaluation of a Skin Lesion

## 2025-06-12 NOTE — PROGRESS NOTES
Nursing Focus: Chemotherapy  D: Positive brisk bright red blood return via PICC. Insertion site is clean/dry/intact, dressing intact with no complaints of pain.  Urine output is recorded in intake Doc Flowsheet. Neuros intact.   I: Premedications given per order (see electronic medical administration record). Cytarabine started to infuse over 3 hours. Reviewed pt teaching on chemotherapy side effects.  Pt denies need for further teaching. Chemotherapy double checked per protocol by two chemotherapy competent RN's.   A: Tolerating chemo well. Denies nausea.   P: Continue to monitor urine output and symptoms of nausea. Screen for symptoms of toxicity.

## 2025-06-12 NOTE — PROGRESS NOTES
[facility]    Progress Note  Hematology / Oncology     Date of Admission:  6/11/2025  Hospital Day #: 1   Date of Service (when I saw the patient): 06/12/2025    Assessment & Plan   Alejandra Villegas is a 59 year old female with a past medical history significant for COPD, migraines, rheumatoid arthritis, aortic stenosis, anxiety, and recent diagnosis of AML who is admitted for scheduled consolidation chemotherapy with HiDAC (C3D1=6/11/25).     TODAY:  - D2 HiDAC; tolerating well thus far  - Start allopurinol for elevated uric acid. Trend Phos (4.7), all other TLS labs WNL.   - Resume 10 mg Lisinopril for essential HTN given recent elevated blood pressures, trend on vitals.  - Neulasta scheduled 6/16 at [facility]  - Continue best supportive cares.     HEME  # AML with NPM1 mutation  Had been seen by PCP Dec 2024 w/ progressive fatigue and nausea where she was found leukopenic WBC  2.4 and neutropenic w/ ANC 0.3. Hgb 12. Was referred to local hematology/oncology clinic for further evaluation and seen by Dr. Samina Harris. BMBx 2/10/25 done at OSH showed hypercellular marrow w/ trilineage hematopoiesis w/ dyspoiesis with mildly elevated blasts of 4% on morphology. NGS w/ NPM1, IDH2, and NRAS mutations. She was admitted to [facility] 2/26/25 for further workup and management. Slides were re-reviewed by [facility] Hematopathology, who noted hypercellular marrow with 8% blasts by morphology. Despite relatively low blast percentage, findings were felt most consistent with a diagnosis AML with NPM1 mutation by WHO 2022 (which does not require a specific blast threshold). Following interdepartmental discussions and review of the available literature, patient was started on intensive induction with 7+3 (Day 1=3/5/25). Midcycle BMBx 3/19/25 with no morphologic or immunophenotypic evidence of AML. D28 BMBx completed 3/31/25 with hypercellular marrow (60 to 70%) with no overt dysplasia or  increase in blasts, noted flow with 4.3% blasts of unusual phenotype; MRD- by NGS. Due to overall disposition timeline as well as patient living in [facility], preceded directly with consolidation chemotherapy with HiDAC (C1D1=4/3/25) which she tolerated well. Completed C2 HiDAC (C2D1=5/6/2025) without complications. Bone marrow biopsy completed s/p 2 cycles of HiDAC on 6/6/25 with no evidence of persistent AML; MRD pending. Now admitted for C3 HiDAC (C3D1=6/11/25).  - PICC placed upon admission, plan to remove prior to discharge  - Baseline cardiopulmonary studies:  - Echo w/ EF 60-65%, mild known aortic stenosis.  - EKG (2/27) with NSR, QTc 480  - CXR (4/27) Small area of new infiltrate versus atelectasis left lung base. No consolidation. R lung clear.  - Baseline viral serologies: CMV IgG+, EBV IgG+, HepB sAg-, HepB cAb-, HepB sAb-, HSV1+/2+, HIV-  - HLA Typing sent on 3/3/2025 and 4/25/2025. BMT Consult on 4/16, no immediate plans for bone marrow transplant.     Treatment plan: HiDAC (C3D1= 6/11/2025)   - Cytarabine 3 g/m  IV q12h D1-3   - Neulasta 6 mg D5 - scheduled at [facility] 6/16  Supportive medications: Zofran 8 mg q12h, Dexamethasone 4 mg q12h D1-3, Prednisolone eye drops QID D1-5    # Elevated uric acid  # Elevated phosphorus  # Risk for TLS, low  Uric acid elevated to 6.5 after 1 dose of Cytarabine. Phosphorus also mildly elevated to 4.7. History of similar elevations with C2 of HiDAC. Cr, Ca, K all WNL.   - Start allopurinol 300 mg daily x6/12  - Trend Phos, start Phoslo 667 mg TID if Phos >5.0  - Trend Cr, if evidence of SABRA start maintenance fluids  ml/hr  - Replete electrolytes per protocol     # Anemia  # Risk for pancytopenia  Secondary to underlying hematologic malignancy and exacerbated by recent chemotherapy. Historical pancytopenia has since recovered.  - Follow daily CBC  - Transfuse to keep Hgb >7, plt >10K     ID   # ID prophylaxis  - Acyclovir 400 mg BID  - Levaquin 250  mg daily when ANC <1.0; ANC on admission 4.4  - Posaconazole 300 mg daily - continue given smoking history   - Note: Posaconazole copay $1.60. Trialed initially on vori, then rotated to posa x3/18 given visual hallucinations     # Recent neutropenic fever  # Recent colitis  She presented to [facility] ED on 5/18 with neutropenic fever, diarrhea, and abdominal pain and was found to have colitis on CT and RVP positive for coronavirus. She was transferred to [facility] overnight 5/18-5/19. On 5/19 AM, she had improved rapidly after initiation of IV cefepime and Flagyl. She had resolution of fever and improvement in abdominal pain and diarrhea. On 5/20, patient continued to improve with resolution of abdominal pain and diarrhea and resolution of neutropenia, thought to be related to delayed Neulasta effect. Given drastic clinical improvement and resolution of neutropenia, patient was discharged to home to complete course of oral antibiotics.   - Complete resolution of symptoms noted on admission; completed antibiotic regimen as prescribed.      GI  # GERD  # History of intermittent epigastric pain  CT C/A/P 4/14/2025 with evidence of esophagitis (thought due to reflux/recent chemotherapy) and small hiatal hernia. Symptoms have historically improved on maximal medical therapy as below.  - Continue PTA Protonix BID, Pepcid BID, Carafate QID, TUMS PRN     PULM  # COPD  Longstanding history of COPD. On admission patient reports symptoms are manageable with no recent exacerbations. Most recent PFTs (2018) were normal.  - Continue PTA Breo Ellipta inhaler and PRN DuoNebs   - Avoid supplemental oxygen unless O2 sat <88%.   - Encourage smoking cessation     CV  # Essential hypertension  Previously on lisinopril 10 mg daily, which was held due to well-managed blood pressures during prior admission. Anticipate resumption during this admission with recent elevated readings.  - Resume lisinopril 10 mg daily x6/12  - Trend BPs  - PCP follow-up  on 6/16/25 to discuss ongoing HTN management     # Infrarenal abdominal aortic aneurysm  Noted incidentally on CT C/A/P (3/19/25), measuring 3.4 cm in diameter.  - Attention on follow-up imaging     RENAL/FEN  # Stage 2 CKD  Baseline Cr ~ 0.7. On admission Cr 0.76  - Continue to trend on daily labs and encourage PO hydration     NEURO  # Chronic migraine w/o aura  # Chronic headaches  Present since childhood. Reportedly triggered by neck manipulation/movement. Reportedly well-managed with regimen below. Headaches occurring throughout admission with daily APAP use, also chronic and managed with APAP PRN at home. Patient notes that her headaches throughout prior hospitalizations have been consistent with her typical daily headaches with no reported changes in character, quality, location, severity, or frequency. She denies new associated neurological changes. May consider LP to exclude CNS leukemia, but given this reassuring history and absence of other indications for LP (no hyperleukocytosis, no monocytic phenotype, no FLT3 mutation, etc), this was historically deferred.  - APAP PRN  - Continue PTA sumatriptan and cyclobenzaprine PRN  - Could reconsider need for LP if headache worsens/changes in character or quality in the future     MISC   # BROOKS  # MDD  # At risk for adjustment disorder  - Continue PTA paroxetine, Atarax PRN     # Tobacco use disorder  Has smoked since age 16, 1.5 ppd (roughly 65 pack years). Attempting to cut back as recently as 01/2025 to 0.5 ppd. We discussed the risks of smoking during induction chemotherapy including increased risk for infection in setting of severe neutropenia that could further complicate course, placing her at risk for severe complications that could be life-threatening or even fatal. Trialed nicotine patch, then self-discontinued after reporting that she felt the nicotine patch precipitated an anxiety attack/episode of chest pain on 3/9. Has been offered a lower dose  "versus alternative form of NRT, which patient declined.  - Encourage smoking cessation; patient continues to smoke at this time, despite understanding/acknowledgement of the risks. Reports trying to cut down.  - Continue posaconazole ppx, regardless of ANC, given high risk for fungal pulmonary infection in the setting of ongoing tobacco abuse.     # Degenerative disc disease  Appears likely multi-level; no MRI available for review, though note that patient has previously had epidural injections at L3-4 and L5-S1 (2016). Reported taking gabapentin 400 mg TID PRN prior to admission. Mild exacerbation noted 3/29; improved with resumption of gabapentin.  - Continue gabapentin 400 mg TID     # Deconditioning  Using cane regularly for ambulatory assistance.  - Consider PT consult while inpatient     Chronic Problems:  # Vitamin D deficiency - Continue PTA vit D supplement  # Seasonal allergies - Claritin 10 mg daily  # Rheumatoid arthritis - Patient reported trying treatment though was unable to tolerate well. Managed with Tylenol PRN. Most recent RF >650 (2/10/25). JI negative.    # Prediabetes - Last A1c 6.0 x12/9/24. Has been managing with lifestyle modifications.   # Mixed hyperlipidemia - Lipid panel has remained at goal, 1/14/25 panel w/ cholesterol 163, , LDL 92, HDL 45. - Held PTA atorvastatin on admission  # H/o aortic stenosis - Patient noted on admission longstanding history of aortic stenosis. Pre-chemo echo w/ EF 60-65% and mild aortic stenosis and insufficiency. No previous echo to compare.    Clinically Significant Risk Factors Present on Admission                   # Hypertension: Noted on problem list      # Anemia: based on hgb <11       # Overweight: Estimated body mass index is 27.78 kg/m  as calculated from the following:    Height as of 5/18/25: 1.702 m (5' 7\").    Weight as of this encounter: 80.5 kg (177 lb 6.4 oz).       # Financial/Environmental Concerns:    # Asthma: noted on problem list " "     Fluids/Electrolytes/Nutrition:  - Regular diet as tolerated  - IVF per chemotherapy protocol   - Lyte replacement PRN per standing protocols    Prophylaxis/Miscellaneous:  Bowel: Senna/Miralax PRN  GI: PTA PPI, Pepcid  VTE: Lovenox  Lines: PICC    Code Status: FULL   Disposition: Admit to hospital for scheduled chemotherapy. Discharge pending regimen completion and clinical tolerance, anticipated on 6/14 AM.   Follow up: 6/16 Neulasta @ [facility]. 6/16, 6/18, 6/20, 6/23, 6/25, 6/27 labs @ [facility]. PCP follow up 6/16 @ [facility].     Medically Ready for Discharge: Anticipated in 2-4 Days       Staffed with Dr. Jaimes.    I spent >55 minutes in the care of this patient today, which included time necessary for review of interval events, obtaining history and physical exam, ordering medication(s)/test(s) as medically indicated, discussion with interdisciplinary/consult team(s), care coordination, and documentation time.     Nery Crowell PA-C  Hematology/Oncology  Pager: #[phone]    Interval History   Chart reviewed. Afebrile and vitally stable, no acute events overnight.     Alejandra is feeling well this morning. Reports sleeping \"like a rock\" overnight. She reports improvement of headaches from yesterday and notes that the only thing that continues to be bothersome are her feet when she stubs her toes. Discussed finding alternative footwear that may be more protective, which she states she will consider. Denies chest pain, shortness of breath, nausea/vomiting/diarrhea, abdominal pain, skin changes, visual disturbances, peripheral edema, fevers, chills, fatigue. Reviewed plan of care and timing of discharge pending any change of clinical status. New medications and their indications, risks, and benefits were outlined. Discussed possible follow up, with ongoing scheduling efforts. All concerns were addressed and questions were answered.     A comprehensive review of symptoms was performed and was " negative except as detailed in the interval history above.    Physical Exam   Vital Signs with Ranges  Temp:  [97.4  F (36.3  C)-98.4  F (36.9  C)] 97.4  F (36.3  C)  Pulse:  [79-88] 84  Resp:  [17-18] 17  BP: (124-166)/(75-94) 166/94  SpO2:  [89 %-98 %] 93 %    I/O last 3 completed shifts:  In: 120 [P.O.:120]  Out: -     Vitals:    06/11/25 1538 06/12/25 0751   Weight: 80.8 kg (178 lb 1.6 oz) 80.5 kg (177 lb 6.4 oz)     Constitutional: Pleasant & smiling, conversational, cooperative female. Awake and alert. Non- toxic appearing. No signs of acute distress.   HEENT: Normocephalic, atraumatic. Sclera clear, conjunctiva normal. Oropharynx with moist mucus membranes. Poor dentition.   Respiratory: Breathing comfortably on room air, speaking in full sentences, no evidence of respiratory distress. Lungs CTAB without stridor, rhonchi, or rales. Diffuse inspiratory/expiratory wheeze.  Cardiovascular: Regular rate and rhythm. Systolic murmur. No peripheral edema.    GI: Abdomen with normoactive bowel sounds, soft, non-distended, and non-tender throughout.   Skin: Skin is clean, dry, and intact. No jaundice, lesions, ecchymosis, or erythema on visible skin. Scattered tattoos.    Neurologic: Alert and oriented. Grossly nonfocal.  Moves extremities spontaneously and equally.  Memory and thought process preserved. Steady but occasionally tremulous gait, intention tremor of BUE.   Neuropsychiatric: Calm, affect congruent to situation. Good judgement and insight.     Medications   Current Facility-Administered Medications   Medication Dose Route Frequency Provider Last Rate Last Admin     Current Facility-Administered Medications   Medication Dose Route Frequency Provider Last Rate Last Admin    acyclovir (ZOVIRAX) tablet 400 mg  400 mg Oral BID Nery Crowell PA-C   400 mg at 06/12/25 1026    allopurinol (ZYLOPRIM) tablet 300 mg  300 mg Oral Daily Nery Crowell PA-C        budesonide-formoterol  (SYMBICORT/BREYNA) 80-4.5 MCG/ACT inhaler 2 puff  2 puff Inhalation BID Nery Crowell PA-C   2 puff at 06/12/25 1031    cyanocobalamin (VITAMIN B-12) tablet 500 mcg  500 mcg Oral Daily Nery Crowell PA-C   500 mcg at 06/12/25 1024    cytarabine (PF) (CYTOSAR) 5,730 mg in D5W 332.3 mL infusion  3 g/m2 (Treatment Plan Recorded) Intravenous Q12H Manuel Griffiths .8 mL/hr at 06/12/25 0624 5,730 mg at 06/12/25 0624    dexAMETHasone (DECADRON) tablet 4 mg  4 mg Oral Q12H Manuel Griffiths MD   4 mg at 06/12/25 0546    enoxaparin ANTICOAGULANT (LOVENOX) injection 40 mg  40 mg Subcutaneous Q24H Nery Crowell PA-C        famotidine (PEPCID) tablet 20 mg  20 mg Oral BID Nery Crowell PA-C   20 mg at 06/12/25 1025    [START ON 6/15/2025] filgrastim-aafi (NIVESTYM) injection 480 mcg  480 mcg Subcutaneous Daily at 8 pm Manuel Griffiths MD        fluticasone (FLONASE) 50 MCG/ACT spray 2 spray  2 spray Both Nostrils BID Nery Crowell PA-C   2 spray at 06/12/25 1031    gabapentin (NEURONTIN) capsule 400 mg  400 mg Oral TID Nery Crowell PA-C   400 mg at 06/12/25 1024    heparin lock flush 10 unit/mL injection 5-15 mL  5-15 mL Intracatheter Q24H Nery Crowell PA-C   5 mL at 06/11/25 1818    lisinopril (ZESTRIL) tablet 10 mg  10 mg Oral Daily Nery Crowell PA-C        loratadine (CLARITIN) tablet 10 mg  10 mg Oral Daily Nery Crowell PA-C   10 mg at 06/12/25 1025    ondansetron (ZOFRAN) tablet 8 mg  8 mg Oral Q12H Manuel Griffiths MD   8 mg at 06/12/25 0546    pantoprazole (PROTONIX) EC tablet 40 mg  40 mg Oral BID  Nery Crowell PA-C   40 mg at 06/12/25 1025    PARoxetine (PAXIL) tablet 20 mg  20 mg Oral Nery Dubose PA-C   20 mg at 06/12/25 1031    posaconazole (NOXAFIL) DR tablet TBEC 300 mg  300 mg Oral Nery Dubose PA-C   300 mg at 06/12/25 1026    prednisoLONE acetate (PRED FORTE) 1 % ophthalmic susp  2 drop  2 drop Both Eyes 4x Daily Manuel Griffiths MD   2 drop at 06/12/25 1032    sodium chloride (PF) 0.9% PF flush 10-40 mL  10-40 mL Intracatheter Q7 Days Nery Crowell PA-C        sodium chloride (PF) 0.9% PF flush 10-40 mL  10-40 mL Intracatheter Daily Nery Crowell PA-C   10 mL at 06/11/25 1704    sodium chloride (PF) 0.9% PF flush 10-40 mL  10-40 mL Intracatheter Q8H Nery Crowell PA-C   20 mL at 06/12/25 0545    sucralfate (CARAFATE) suspension 1 g  1 g Oral 4x Daily AC & HS Nery Crowell PA-C   1 g at 06/12/25 1026    Vitamin D3 (CHOLECALCIFEROL) tablet 25 mcg  25 mcg Oral Daily Nery Crowell PA-C   25 mcg at 06/12/25 1025     Antiinfectives  Anti-infectives (From now, onward)      Start     Dose/Rate Route Frequency Ordered Stop    06/12/25 0800  posaconazole (NOXAFIL) DR tablet TBEC 300 mg        Note to Pharmacy: PTA Sig:Take 3 tablets (300 mg) by mouth every morning.      300 mg Oral EVERY MORNING 06/11/25 1548      06/11/25 2000  acyclovir (ZOVIRAX) tablet 400 mg        Note to Pharmacy: PTA Sig:Take 1 tablet (400 mg) by mouth 2 times daily.      400 mg Oral 2 TIMES DAILY 06/11/25 1548            Data   CBC   Recent Labs   Lab 06/12/25  0545 06/11/25  1659 06/11/25  1239 06/09/25  1311   WBC 5.0 8.5 7.5 8.3   RBC 3.37* 3.34* 3.49* 3.80   HGB 10.4* 10.2* 10.6* 11.6*   HCT 33.7* 33.1* 33.8* 36.8    99 97 97   MCH 30.9 30.5 30.4 30.5   MCHC 30.9* 30.8* 31.4* 31.5   RDW 22.3* 22.8* 22.8* 23.5*    426 442 539*     CMP   Recent Labs   Lab 06/12/25  0545 06/11/25  1659 06/11/25  1239 06/09/25  1311    140 140 140   POTASSIUM 4.0 3.4 3.6 3.5   CHLORIDE 104 100 103 101   CO2 26 27 24 25   ANIONGAP 11 13 13 14   * 110* 102* 126*   BUN 13.6 7.6 7.8 6.4   CR 0.60 0.64 0.62 0.75   GFRESTIMATED >90 >90 >90 >90   TOBIN 9.7 9.5 9.8 9.4   MAG 2.2 2.2  --   --    PHOS 4.7* 4.0  --   --    PROTTOTAL 6.7 6.9 7.2 7.4   ALBUMIN 3.7 3.9 3.9 3.9    BILITOTAL 0.3 0.4 0.4 0.4   ALKPHOS 105 111 120 133   AST 22 25 21 22   ALT 11 13 10 13     LFTs   Recent Labs   Lab 06/12/25  0545   PROTTOTAL 6.7   ALBUMIN 3.7   BILITOTAL 0.3   ALKPHOS 105   AST 22   ALT 11     Coagulation Studies   Recent Labs   Lab 06/11/25  1659   INR 1.10

## 2025-06-13 LAB
ABO + RH BLD: NORMAL
ALBUMIN SERPL BCG-MCNC: 3.6 G/DL (ref 3.5–5.2)
ALP SERPL-CCNC: 94 U/L (ref 40–150)
ALT SERPL W P-5'-P-CCNC: 11 U/L (ref 0–50)
ANION GAP SERPL CALCULATED.3IONS-SCNC: 10 MMOL/L (ref 7–15)
AST SERPL W P-5'-P-CCNC: 18 U/L (ref 0–45)
BASOPHILS # BLD AUTO: 0 10E3/UL (ref 0–0.2)
BASOPHILS NFR BLD AUTO: 0 %
BILIRUB SERPL-MCNC: 0.3 MG/DL
BLD GP AB SCN SERPL QL: NEGATIVE
BUN SERPL-MCNC: 19.2 MG/DL (ref 6–20)
CALCIUM SERPL-MCNC: 9.4 MG/DL (ref 8.8–10.4)
CHLORIDE SERPL-SCNC: 104 MMOL/L (ref 98–107)
CREAT SERPL-MCNC: 0.57 MG/DL (ref 0.51–0.95)
EGFRCR SERPLBLD CKD-EPI 2021: >90 ML/MIN/1.73M2
EOSINOPHIL # BLD AUTO: 0 10E3/UL (ref 0–0.7)
EOSINOPHIL NFR BLD AUTO: 0 %
ERYTHROCYTE [DISTWIDTH] IN BLOOD BY AUTOMATED COUNT: 22.3 % (ref 10–15)
GLUCOSE SERPL-MCNC: 161 MG/DL (ref 70–99)
HCO3 SERPL-SCNC: 27 MMOL/L (ref 22–29)
HCT VFR BLD AUTO: 29.3 % (ref 35–47)
HGB BLD-MCNC: 9.2 G/DL (ref 11.7–15.7)
IMM GRANULOCYTES # BLD: 0.1 10E3/UL
IMM GRANULOCYTES NFR BLD: 1 %
LYMPHOCYTES # BLD AUTO: 0.1 10E3/UL (ref 0.8–5.3)
LYMPHOCYTES NFR BLD AUTO: 1 %
MAGNESIUM SERPL-MCNC: 2.2 MG/DL (ref 1.7–2.3)
MCH RBC QN AUTO: 30.7 PG (ref 26.5–33)
MCHC RBC AUTO-ENTMCNC: 31.4 G/DL (ref 31.5–36.5)
MCV RBC AUTO: 98 FL (ref 78–100)
MONOCYTES # BLD AUTO: 0.4 10E3/UL (ref 0–1.3)
MONOCYTES NFR BLD AUTO: 3 %
NEUTROPHILS # BLD AUTO: 14.4 10E3/UL (ref 1.6–8.3)
NEUTROPHILS NFR BLD AUTO: 96 %
NRBC # BLD AUTO: 0 10E3/UL
NRBC BLD AUTO-RTO: 0 /100
PHOSPHATE SERPL-MCNC: 4.3 MG/DL (ref 2.5–4.5)
PLATELET # BLD AUTO: 328 10E3/UL (ref 150–450)
POTASSIUM SERPL-SCNC: 4.4 MMOL/L (ref 3.4–5.3)
PROT SERPL-MCNC: 6.4 G/DL (ref 6.4–8.3)
RBC # BLD AUTO: 3 10E6/UL (ref 3.8–5.2)
SODIUM SERPL-SCNC: 141 MMOL/L (ref 135–145)
SPECIMEN EXP DATE BLD: NORMAL
URATE SERPL-MCNC: 5 MG/DL (ref 2.4–5.7)
WBC # BLD AUTO: 14.9 10E3/UL (ref 4–11)

## 2025-06-13 PROCEDURE — 250N000011 HC RX IP 250 OP 636

## 2025-06-13 PROCEDURE — 250N000013 HC RX MED GY IP 250 OP 250 PS 637

## 2025-06-13 PROCEDURE — 258N000003 HC RX IP 258 OP 636: Performed by: STUDENT IN AN ORGANIZED HEALTH CARE EDUCATION/TRAINING PROGRAM

## 2025-06-13 PROCEDURE — 83735 ASSAY OF MAGNESIUM: CPT

## 2025-06-13 PROCEDURE — 84155 ASSAY OF PROTEIN SERUM: CPT

## 2025-06-13 PROCEDURE — 85004 AUTOMATED DIFF WBC COUNT: CPT

## 2025-06-13 PROCEDURE — 99233 SBSQ HOSP IP/OBS HIGH 50: CPT | Mod: FS

## 2025-06-13 PROCEDURE — 120N000002 HC R&B MED SURG/OB UMMC

## 2025-06-13 PROCEDURE — 84100 ASSAY OF PHOSPHORUS: CPT

## 2025-06-13 PROCEDURE — 86901 BLOOD TYPING SEROLOGIC RH(D): CPT

## 2025-06-13 PROCEDURE — 250N000012 HC RX MED GY IP 250 OP 636 PS 637: Performed by: STUDENT IN AN ORGANIZED HEALTH CARE EDUCATION/TRAINING PROGRAM

## 2025-06-13 PROCEDURE — 250N000011 HC RX IP 250 OP 636: Performed by: STUDENT IN AN ORGANIZED HEALTH CARE EDUCATION/TRAINING PROGRAM

## 2025-06-13 PROCEDURE — 84550 ASSAY OF BLOOD/URIC ACID: CPT

## 2025-06-13 RX ORDER — LISINOPRIL 10 MG/1
10 TABLET ORAL DAILY
Qty: 30 TABLET | Refills: 0 | Status: SHIPPED | OUTPATIENT
Start: 2025-06-14 | End: 2025-06-25

## 2025-06-13 RX ORDER — PREDNISOLONE ACETATE 10 MG/ML
2 SUSPENSION/ DROPS OPHTHALMIC 4 TIMES DAILY
Qty: 5 ML | Refills: 0 | Status: ON HOLD | OUTPATIENT
Start: 2025-06-13 | End: 2025-06-25

## 2025-06-13 RX ORDER — POSACONAZOLE 100 MG/1
300 TABLET, DELAYED RELEASE ORAL EVERY MORNING
Qty: 90 TABLET | Refills: 0 | Status: ACTIVE | OUTPATIENT
Start: 2025-06-13 | End: 2025-06-25

## 2025-06-13 RX ORDER — ACYCLOVIR 400 MG/1
400 TABLET ORAL 2 TIMES DAILY
Qty: 120 TABLET | Refills: 0 | Status: ACTIVE | OUTPATIENT
Start: 2025-06-13 | End: 2025-06-25

## 2025-06-13 RX ADMIN — CYTARABINE 5730 MG: 100 INJECTION, SOLUTION INTRATHECAL; INTRAVENOUS; SUBCUTANEOUS at 18:23

## 2025-06-13 RX ADMIN — PREDNISOLONE ACETATE 2 DROP: 10 SUSPENSION/ DROPS OPHTHALMIC at 17:13

## 2025-06-13 RX ADMIN — ALLOPURINOL 300 MG: 300 TABLET ORAL at 10:05

## 2025-06-13 RX ADMIN — Medication 5 ML: at 04:13

## 2025-06-13 RX ADMIN — FLUTICASONE PROPIONATE 2 SPRAY: 50 SPRAY, METERED NASAL at 21:15

## 2025-06-13 RX ADMIN — ONDANSETRON HYDROCHLORIDE 8 MG: 8 TABLET, FILM COATED ORAL at 06:04

## 2025-06-13 RX ADMIN — SUCRALFATE 1 G: 1 SUSPENSION ORAL at 17:13

## 2025-06-13 RX ADMIN — POSACONAZOLE 300 MG: 100 TABLET, DELAYED RELEASE ORAL at 10:05

## 2025-06-13 RX ADMIN — LISINOPRIL 10 MG: 10 TABLET ORAL at 10:05

## 2025-06-13 RX ADMIN — BUDESONIDE AND FORMOTEROL FUMARATE DIHYDRATE 2 PUFF: 80; 4.5 AEROSOL RESPIRATORY (INHALATION) at 10:04

## 2025-06-13 RX ADMIN — PREDNISOLONE ACETATE 2 DROP: 10 SUSPENSION/ DROPS OPHTHALMIC at 21:15

## 2025-06-13 RX ADMIN — Medication 5 ML: at 22:14

## 2025-06-13 RX ADMIN — FLUTICASONE PROPIONATE 2 SPRAY: 50 SPRAY, METERED NASAL at 10:04

## 2025-06-13 RX ADMIN — DEXAMETHASONE 4 MG: 4 TABLET ORAL at 06:04

## 2025-06-13 RX ADMIN — SUCRALFATE 1 G: 1 SUSPENSION ORAL at 10:54

## 2025-06-13 RX ADMIN — GABAPENTIN 400 MG: 300 CAPSULE ORAL at 14:14

## 2025-06-13 RX ADMIN — CYTARABINE 5730 MG: 100 INJECTION, SOLUTION INTRATHECAL; INTRAVENOUS; SUBCUTANEOUS at 06:46

## 2025-06-13 RX ADMIN — DEXAMETHASONE 4 MG: 4 TABLET ORAL at 17:53

## 2025-06-13 RX ADMIN — PANTOPRAZOLE SODIUM 40 MG: 40 TABLET, DELAYED RELEASE ORAL at 10:05

## 2025-06-13 RX ADMIN — ENOXAPARIN SODIUM 40 MG: 40 INJECTION SUBCUTANEOUS at 21:15

## 2025-06-13 RX ADMIN — ACYCLOVIR 400 MG: 400 TABLET ORAL at 10:05

## 2025-06-13 RX ADMIN — PREDNISOLONE ACETATE 2 DROP: 10 SUSPENSION/ DROPS OPHTHALMIC at 12:43

## 2025-06-13 RX ADMIN — ACYCLOVIR 400 MG: 400 TABLET ORAL at 21:16

## 2025-06-13 RX ADMIN — LORATADINE 10 MG: 10 TABLET ORAL at 10:05

## 2025-06-13 RX ADMIN — ACETAMINOPHEN 650 MG: 325 TABLET, FILM COATED ORAL at 01:25

## 2025-06-13 RX ADMIN — PAROXETINE HYDROCHLORIDE 20 MG: 20 TABLET, FILM COATED ORAL at 10:05

## 2025-06-13 RX ADMIN — PREDNISOLONE ACETATE 2 DROP: 10 SUSPENSION/ DROPS OPHTHALMIC at 10:04

## 2025-06-13 RX ADMIN — GABAPENTIN 400 MG: 300 CAPSULE ORAL at 10:05

## 2025-06-13 RX ADMIN — PANTOPRAZOLE SODIUM 40 MG: 40 TABLET, DELAYED RELEASE ORAL at 17:13

## 2025-06-13 RX ADMIN — GABAPENTIN 400 MG: 300 CAPSULE ORAL at 21:15

## 2025-06-13 RX ADMIN — Medication 5 ML: at 10:09

## 2025-06-13 RX ADMIN — Medication 25 MCG: at 10:05

## 2025-06-13 RX ADMIN — FAMOTIDINE 20 MG: 20 TABLET, FILM COATED ORAL at 10:05

## 2025-06-13 RX ADMIN — BUDESONIDE AND FORMOTEROL FUMARATE DIHYDRATE 2 PUFF: 80; 4.5 AEROSOL RESPIRATORY (INHALATION) at 21:15

## 2025-06-13 RX ADMIN — SUCRALFATE 1 G: 1 SUSPENSION ORAL at 21:15

## 2025-06-13 RX ADMIN — ONDANSETRON HYDROCHLORIDE 8 MG: 8 TABLET, FILM COATED ORAL at 17:53

## 2025-06-13 RX ADMIN — FAMOTIDINE 20 MG: 20 TABLET, FILM COATED ORAL at 21:16

## 2025-06-13 RX ADMIN — ACETAMINOPHEN 650 MG: 325 TABLET, FILM COATED ORAL at 17:53

## 2025-06-13 RX ADMIN — ACETAMINOPHEN 650 MG: 325 TABLET, FILM COATED ORAL at 14:17

## 2025-06-13 RX ADMIN — CYANOCOBALAMIN TAB 500 MCG 500 MCG: 500 TAB at 10:05

## 2025-06-13 ASSESSMENT — ACTIVITIES OF DAILY LIVING (ADL)
ADLS_ACUITY_SCORE: 40
ADLS_ACUITY_SCORE: 42
ADLS_ACUITY_SCORE: 42
ADLS_ACUITY_SCORE: 40
ADLS_ACUITY_SCORE: 42
ADLS_ACUITY_SCORE: 40
ADLS_ACUITY_SCORE: 40
ADLS_ACUITY_SCORE: 42
ADLS_ACUITY_SCORE: 40
ADLS_ACUITY_SCORE: 40
ADLS_ACUITY_SCORE: 42
ADLS_ACUITY_SCORE: 40
ADLS_ACUITY_SCORE: 42
ADLS_ACUITY_SCORE: 42
ADLS_ACUITY_SCORE: 40
DEPENDENT_IADLS:: INDEPENDENT
ADLS_ACUITY_SCORE: 40
ADLS_ACUITY_SCORE: 40
ADLS_ACUITY_SCORE: 42
ADLS_ACUITY_SCORE: 40

## 2025-06-13 NOTE — PLAN OF CARE
Goal Outcome Evaluation:      Neuro: A&Ox4.   Cardiac: Afebrile, VSS.            Respiratory: WDL  GI/: Voiding spontaneously. No BM this shift.   Diet/appetite: Tolerating diet. Denies nausea   Activity: Up independently. Ambulating off unit    Pain: . Denies   Skin: No new deficits noted.  Lines: Dbl lumen PICC. Hep locked. + blood return noted on red lumen- labs drawn. Hep locked  Replacement: RN managed replacement/Conditional transfusion orders in place. Am labs reviewed- no replacements or transfusions needed. Lab rechecks ordered per protocols in place.

## 2025-06-13 NOTE — CONSULTS
Care Management Initial Consult/Discharge    General Information  Assessment completed with: -chart review, Patient,    Type of CM/SW Visit: Initial Assessment    Primary Care Provider verified and updated as needed:     Readmission within the last 30 days: planned readmission         Advance Care Planning: Advance Care Planning Reviewed: other (see comments) (Blank copy given)        Communication Assessment  Patient's communication style: spoken language (English or Bilingual)    Hearing Difficulty or Deaf: no   Wear Glasses or Blind: yes    Cognitive  Cognitive/Neuro/Behavioral: WDL                      Living Environment:   People in home: child(lana), adult (son)     Current living Arrangements: mobile home (now has wood stove, running water)      Able to return to prior arrangements:       Family/Social Support:  Care provided by: self (sister)  Provides care for: no one     Support system:            Description of Support System:    Supportive     Current Resources:   Patient receiving home care services: No  Community Resources:  none  Equipment currently used at home: cane, straight  Supplies currently used at home:      Employment/Financial:  Employment Status: disabled     Financial Concerns: none      Does the patient's insurance plan have a 3 day qualifying hospital stay waiver?  No    Lifestyle & Psychosocial Needs:  Social Drivers of Health     Food Insecurity: Low Risk  (6/11/2025)    Food Insecurity     Within the past 12 months, did you worry that your food would run out before you got money to buy more?: No     Within the past 12 months, did the food you bought just not last and you didn t have money to get more?: No   Depression: Not at risk (6/4/2025)    PHQ-2     PHQ-2 Score: 0   Housing Stability: Low Risk  (6/11/2025)    Housing Stability     Do you have housing? : Yes     Are you worried about losing your housing?: No   Tobacco Use: High Risk (6/11/2025)    Patient History     Smoking  "Tobacco Use: Every Day     Smokeless Tobacco Use: Never     Passive Exposure: Past   Financial Resource Strain: Low Risk  (6/11/2025)    Financial Resource Strain     Within the past 12 months, have you or your family members you live with been unable to get utilities (heat, electricity) when it was really needed?: No   Alcohol Use: Not on file   Transportation Needs: Low Risk  (6/11/2025)    Transportation Needs     Within the past 12 months, has lack of transportation kept you from medical appointments, getting your medicines, non-medical meetings or appointments, work, or from getting things that you need?: No   Physical Activity: Not on file   Interpersonal Safety: Low Risk  (6/11/2025)    Interpersonal Safety     Do you feel physically and emotionally safe where you currently live?: Yes     Within the past 12 months, have you been hit, slapped, kicked or otherwise physically hurt by someone?: No     Within the past 12 months, have you been humiliated or emotionally abused in other ways by your partner or ex-partner?: No   Stress: Not on file   Social Connections: Not on file   Health Literacy: Not on file     Functional Status:  Prior to admission patient needed assistance:   Dependent ADLs:: Independent  Dependent IADLs:: Independent     Mental Health Status:  Chemical Dependency Status:        Values/Beliefs:  Spiritual, Cultural Beliefs, Bahai Practices, Values that affect care: no             Discussed  Partnership in Safe Discharge Planning  document with patient/family: No    Additional Information:  Per A&P (Nery Crowell PA-C , 6/13) : \"past medical history significant for COPD, migraines, rheumatoid arthritis, aortic stenosis, anxiety, and recent diagnosis of AML who is admitted for scheduled consolidation chemotherapy with HiDAC (C3D1=6/11/25). \"     Writer went to bedside to address Care Management / Social Work IP Consult automatically ordered due to Elevated Risk Score. Introduced self " "and role of Care Management in discharge planning. Patient validated no changes in current address, PCP or insurance coverage since recent admission. She will be establishing primary care with \"Dr. SOTO" in [place], but has not yet established.     Alejandra declines the needs for any assistance with finances, transportation or access to care. Her son lives with her and provides assistance as needed. She states that her Sister is her primary support person - helped her get a wood stove and running water in her home which she's grateful for. Patient requested a blank HCD, she will complete it today in hopes for it to be notarized prior to discharge.     Per [facility] [employer], patient will be medically ready for discharge tomorrow 6/14 with no needs. [facility] scheduled Neulasta and Lab appointments in [facility] for 6/16 at 2:30pm. Patient is aware of this appointment.     Next Steps:   [x] IMM (delegated to CHW)  [] Handoff: n/a  [x] HCD notarized   [] Ride: Sister    Jyoti Patten, [facility]  Care Management Department  Covering 5A: 8104-9177 & 5C: 7097-9605 (non-BMT)   Phone: [phone]  Teams  Vocera: weekdays 8:00 am - 4:30 pm.   See Vocera Care Team for off-day coverage   "

## 2025-06-13 NOTE — PLAN OF CARE
[id number]    C3D3 of HiDAC. Slightly hypertensive, within parameters. OVSS, afebrile and on RA. A&Ox4. Denies SOB, but endorses congested cough. No nausea reported, but noticeable little appetite. Initially denied pain, however reported 4/10 lower back pain this afternoon, managed w/ tylenol x1 with effectiveness. Showered this morning, ambulated frequently outside. Had BM this morning, voiding spontaneously. Up independently. Will receive 2 more doses of chemo, likely to discharge after last dose on Saturday. Continue w/ POC.     Goal Outcome Evaluation:      Plan of Care Reviewed With: patient    Overall Patient Progress: no changeOverall Patient Progress: no change    Outcome Evaluation: Cycle 3 Dose 4 HiDAC completed this shift, plan to discharge tomorrow after 6th dose

## 2025-06-13 NOTE — PROGRESS NOTES
Nursing Focus: Chemotherapy  D: Positive blood return via PICC. Insertion site is clean/dry/intact, dressing intact with no complaints of pain.  Pre infusion assessment documented in Flowsheet (if applicable). Neuros intact.  I: Premedications given per order (see electronic medical administration record). Dose #4 of 6 started to infuse over 3 hours. Reviewed pt teaching on chemotherapy side effects.  Pt denies need for further teaching. Chemotherapy double checked per protocol by two chemotherapy competent RN's.   A: Tolerating infusion well. Denies nausea and or pain.   P: Continue to monitor urine output and symptoms of nausea. Screen for symptoms of toxicity.

## 2025-06-13 NOTE — PROGRESS NOTES
Healthcare Directive    6/13: CHW delegated by KATH notarized the Health Care Directive. Pt was alert and oriented. Pt confirmed their Health Care Agents and signed. A copy was forwarded to [facility] and the original copy left with Pt, and a copy filed in Pt's hard chart.      EDD Vega  4 ICU/OBS/ED, Community Health Worker   [facility]  Phone: [phone]

## 2025-06-13 NOTE — PLAN OF CARE
Goal Outcome Evaluation:      Plan of Care Reviewed With: patient    Overall Patient Progress: improvingOverall Patient Progress: improving    Outcome Evaluation: Cycle 3 Dose 4 HiDAC started this shift  Time    /73   Pulse 86   Temp 97.6  F (36.4  C) (Oral)   Resp 18   Wt 80.5 kg (177 lb 6.4 oz)   LMP 01/01/2007 (Approximate)   SpO2 93%   BMI 27.78 kg/m      Reason for admission: Consolidation Chemo   Activity: UAL  Pain: 0/10-6/10: Lower back pain   Neuro: A&Ox4 WDL  Cardiac: WDL  Respiratory: WDL  GI/: WDL   Diet: Reg   Lines: PIV  Skin: WDL  Labs/imaging: Hbg 9.2 Plts 328       New changes this shift: Started Cycle 3 dose 4 tolerated well, lower back pain controlled with PRN tylenol       Plan: Finish chemo treatment and manage side effects PRN       Continue to monitor and follow POC

## 2025-06-13 NOTE — PROGRESS NOTES
[facility]    Progress Note  Hematology / Oncology     Date of Admission:  6/11/2025  Hospital Day #: 2   Date of Service (when I saw the patient): 06/13/2025    Assessment & Plan   Alejandra Villegas is a 59 year old female with a past medical history significant for COPD, migraines, rheumatoid arthritis, aortic stenosis, anxiety, and recent diagnosis of AML who is admitted for scheduled consolidation chemotherapy with HiDAC (C3D1=6/11/25).     TODAY:  - D3 HiDAC; tolerating well thus far  - Continue 10 mg Lisinopril for essential HTN given recent elevated blood pressures, trend on vitals.  - Continue best supportive cares.     HEME  # AML with NPM1 mutation  Had been seen by PCP Dec 2024 w/ progressive fatigue and nausea where she was found leukopenic WBC  2.4 and neutropenic w/ ANC 0.3. Hgb 12. Was referred to local hematology/oncology clinic for further evaluation and seen by Dr. Samina Harris. BMBx 2/10/25 done at OSH showed hypercellular marrow w/ trilineage hematopoiesis w/ dyspoiesis with mildly elevated blasts of 4% on morphology. NGS w/ NPM1, IDH2, and NRAS mutations. She was admitted to [facility] 2/26/25 for further workup and management. Slides were re-reviewed by [facility] Hematopathology, who noted hypercellular marrow with 8% blasts by morphology. Despite relatively low blast percentage, findings were felt most consistent with a diagnosis AML with NPM1 mutation by WHO 2022 (which does not require a specific blast threshold). Following interdepartmental discussions and review of the available literature, patient was started on intensive induction with 7+3 (Day 1=3/5/25). Midcycle BMBx 3/19/25 with no morphologic or immunophenotypic evidence of AML. D28 BMBx completed 3/31/25 with hypercellular marrow (60 to 70%) with no overt dysplasia or increase in blasts, noted flow with 4.3% blasts of unusual phenotype; MRD- by NGS. Due to overall disposition timeline as well as  [place], preceded directly with consolidation chemotherapy with HiDAC (C1D1=4/3/25) which she tolerated well. Completed C2 HiDAC (C2D1=5/6/2025) without complications. Bone marrow biopsy completed s/p 2 cycles of HiDAC on 6/6/25 with no evidence of persistent AML; MRD pending. Now admitted for C3 HiDAC (C3D1=6/11/25).  - PICC placed upon admission, plan to remove prior to discharge  - Baseline cardiopulmonary studies:  - Echo w/ EF 60-65%, mild known aortic stenosis.  - EKG (2/27) with NSR, QTc 480  - CXR (4/27) Small area of new infiltrate versus atelectasis left lung base. No consolidation. R lung clear.  - Baseline viral serologies: CMV IgG+, EBV IgG+, HepB sAg-, HepB cAb-, HepB sAb-, HSV1+/2+, HIV-  - HLA Typing sent on 3/3/2025 and 4/25/2025. BMT Consult on 4/16, no immediate plans for bone marrow transplant.     Treatment plan: HiDAC (C3D1= 6/11/2025)   - Cytarabine 3 g/m  IV q12h D1-3   - Neulasta 6 mg D5 - scheduled at [facility] 6/16  Supportive medications: Zofran 8 mg q12h, Dexamethasone 4 mg q12h D1-3, Prednisolone eye drops QID D1-5    # Elevated uric acid, improving  # Elevated phosphorus, resolved  # Risk for TLS, low  Uric acid elevated to 6.5 after 1 dose of Cytarabine. Phosphorus also mildly elevated to 4.7. History of similar elevations with C2 of HiDAC. Cr, Ca, K all WNL.   - Allopurinol 300 mg daily x6/12  - Trend Phos, start Phoslo 667 mg TID if Phos >5.0  - Trend Cr, if evidence of SABRA start maintenance fluids  ml/hr  - Replete electrolytes per protocol     # Anemia  # Risk for pancytopenia  Secondary to underlying hematologic malignancy and exacerbated by recent chemotherapy. Historical pancytopenia has since recovered.  - Follow daily CBC  - Transfuse to keep Hgb >7, plt >10K     ID   # ID prophylaxis  - Acyclovir 400 mg BID  - Levaquin 250 mg daily when ANC <1.0; ANC on admission 4.4  - Posaconazole 300 mg daily - continue given smoking history   - Note:  Posaconazole copay $1.60. Trialed initially on vori, then rotated to posa x3/18 given visual hallucinations     # Recent neutropenic fever  # Recent colitis  She presented to OSH ED on 5/18 with neutropenic fever, diarrhea, and abdominal pain and was found to have colitis on CT and RVP positive for coronavirus. She was transferred to [facility] overnight 5/18-5/19. On 5/19 AM, she had improved rapidly after initiation of IV cefepime and Flagyl. She had resolution of fever and improvement in abdominal pain and diarrhea. On 5/20, patient continued to improve with resolution of abdominal pain and diarrhea and resolution of neutropenia, thought to be related to delayed Neulasta effect. Given drastic clinical improvement and resolution of neutropenia, patient was discharged to home to complete course of oral antibiotics.   - Complete resolution of symptoms noted on admission; completed antibiotic regimen as prescribed.      GI  # GERD  # History of intermittent epigastric pain  CT C/A/P 4/14/2025 with evidence of esophagitis (thought due to reflux/recent chemotherapy) and small hiatal hernia. Symptoms have historically improved on maximal medical therapy as below.  - Continue PTA Protonix BID, Pepcid BID, Carafate QID, TUMS PRN     PULM  # COPD  Longstanding history of COPD. On admission patient reports symptoms are manageable with no recent exacerbations. Most recent PFTs (2018) were normal.  - Continue PTA Breo Ellipta inhaler and PRN DuoNebs   - Avoid supplemental oxygen unless O2 sat <88%.   - Encourage smoking cessation     CV  # Essential hypertension  Previously on lisinopril 10 mg daily, which was held due to well-managed blood pressures during prior admission. Anticipate resumption during this admission with recent elevated readings.  - Resume lisinopril 10 mg daily x6/12  - Trend BPs  - PCP follow-up on 6/16/25 to discuss ongoing HTN management     # Infrarenal abdominal aortic aneurysm  Noted incidentally on CT  C/A/P (3/19/25), measuring 3.4 cm in diameter.  - Attention on follow-up imaging     RENAL/FEN  # Stage 2 CKD  Baseline Cr ~ 0.7. On admission Cr 0.76  - Continue to trend on daily labs and encourage PO hydration     NEURO  # Chronic migraine w/o aura  # Chronic headaches  Present since childhood. Reportedly triggered by neck manipulation/movement. Reportedly well-managed with regimen below. Headaches occurring throughout admission with daily APAP use, also chronic and managed with APAP PRN at home. Patient notes that her headaches throughout prior hospitalizations have been consistent with her typical daily headaches with no reported changes in character, quality, location, severity, or frequency. She denies new associated neurological changes. May consider LP to exclude CNS leukemia, but given this reassuring history and absence of other indications for LP (no hyperleukocytosis, no monocytic phenotype, no FLT3 mutation, etc), this was historically deferred.  - APAP PRN  - Continue PTA sumatriptan and cyclobenzaprine PRN  - Could reconsider need for LP if headache worsens/changes in character or quality in the future     MISC   # BROOKS  # MDD  # At risk for adjustment disorder  - Continue PTA paroxetine, Atarax PRN     # Tobacco use disorder  Has smoked since age 16, 1.5 ppd (roughly 65 pack years). Attempting to cut back as recently as 01/2025 to 0.5 ppd. We discussed the risks of smoking during induction chemotherapy including increased risk for infection in setting of severe neutropenia that could further complicate course, placing her at risk for severe complications that could be life-threatening or even fatal. Trialed nicotine patch, then self-discontinued after reporting that she felt the nicotine patch precipitated an anxiety attack/episode of chest pain on 3/9. Has been offered a lower dose versus alternative form of NRT, which patient declined.  - Encourage smoking cessation; patient continues to smoke at  "this time, despite understanding/acknowledgement of the risks. Reports trying to cut down.  - Continue posaconazole ppx, regardless of ANC, given high risk for fungal pulmonary infection in the setting of ongoing tobacco abuse.     # Degenerative disc disease  Appears likely multi-level; no MRI available for review, though note that patient has previously had epidural injections at L3-4 and L5-S1 (2016). Reported taking gabapentin 400 mg TID PRN prior to admission. Mild exacerbation noted 3/29; improved with resumption of gabapentin.  - Continue gabapentin 400 mg TID     # Deconditioning  Using cane regularly for ambulatory assistance.  - Consider PT consult while inpatient     Chronic Problems:  # Vitamin D deficiency - Continue PTA vit D supplement  # Seasonal allergies - Claritin 10 mg daily  # Rheumatoid arthritis - Patient reported trying treatment though was unable to tolerate well. Managed with Tylenol PRN. Most recent RF >650 (2/10/25). JI negative.    # Prediabetes - Last A1c 6.0 x12/9/24. Has been managing with lifestyle modifications.   # Mixed hyperlipidemia - Lipid panel has remained at goal, 1/14/25 panel w/ cholesterol 163, , LDL 92, HDL 45. - Held PTA atorvastatin on admission  # H/o aortic stenosis - Patient noted on admission longstanding history of aortic stenosis. Pre-chemo echo w/ EF 60-65% and mild aortic stenosis and insufficiency. No previous echo to compare.    Clinically Significant Risk Factors                   # Hypertension: Noted on problem list            # Overweight: Estimated body mass index is 27.78 kg/m  as calculated from the following:    Height as of 5/18/25: 1.702 m (5' 7\").    Weight as of this encounter: 80.5 kg (177 lb 6.4 oz)., PRESENT ON ADMISSION       # Financial/Environmental Concerns:    # Asthma: noted on problem list      Fluids/Electrolytes/Nutrition:  - Regular diet as tolerated  - IVF per chemotherapy protocol   - Lyte replacement PRN per standing " protocols    Prophylaxis/Miscellaneous:  Bowel: Senna/Miralax PRN  GI: PTA PPI, Pepcid  VTE: Lovenox  Lines: PICC    Code Status: FULL   Disposition: Admit to hospital for scheduled chemotherapy. Discharge pending regimen completion and clinical tolerance, anticipated on 6/14 AM.   Follow up: 6/16 Neulasta @ [facility]. 6/16, 6/18, 6/20, 6/23, 6/25, 6/27, 6/30, 7/3, 7/7 labs @ [facility]. PCP follow up 6/16 @ [facility]. Virtual [facility] follow up 6/19. APPT18 x6/13 [facility] visit w/ labs prior to admission for C4 on 7/9.    Medically Ready for Discharge: Anticipated Tomorrow       Staffed with Dr. Jaimes.    I spent >55 minutes in the care of this patient today, which included time necessary for review of interval events, obtaining history and physical exam, ordering medication(s)/test(s) as medically indicated, discussion with interdisciplinary/consult team(s), care coordination, and documentation time.     Nery Crowell PA-C  Hematology/Oncology  Pager: #[phone]    Interval History   Chart reviewed. Afebrile and vitally stable, no acute events overnight.     Alejandra continues to feel well today. Reports sleeping well overnight. Appetite is stable with daily bowel movements. Continues to use sucralfate prior to meals for GERD management. Continues to smoke cigarettes throughout the day, reviewed medical recommendation for smoking cessation to which she states understanding and respectfully declines. Denies chest pain, shortness of breath, nausea/vomiting/diarrhea, peripheral edema, skin changes, headache, visual disturbances, fevers, chills. Reviewed plan of care with tentative AM discharge tomorrow. Her sister plans to come and drive her back home. Discussed discharge medications and refill requests. All concerns were addressed and questions were answered.    A comprehensive review of symptoms was performed and was negative except as detailed in the interval history above.    Physical Exam   Vital Signs with  Ranges  Temp:  [97.3  F (36.3  C)-98.2  F (36.8  C)] 97.5  F (36.4  C)  Pulse:  [75-86] 75  Resp:  [16-18] 17  BP: (118-145)/(70-86) 134/75  SpO2:  [92 %-94 %] 93 %    I/O last 3 completed shifts:  In: 280 [P.O.:280]  Out: 300 [Urine:300]    Vitals:    06/11/25 1538 06/12/25 0751   Weight: 80.8 kg (178 lb 1.6 oz) 80.5 kg (177 lb 6.4 oz)     Constitutional: Pleasant & smiling, conversational, cooperative female. Awake and alert. Non- toxic appearing. No signs of acute distress.   HEENT: Normocephalic, atraumatic. Sclera clear, conjunctiva normal. Oropharynx with moist mucus membranes. Poor dentition.   Respiratory: Breathing comfortably on room air, speaking in full sentences, no evidence of respiratory distress. Lungs CTAB without stridor, rhonchi, or rales. Diffuse inspiratory/expiratory wheeze.  Cardiovascular: Regular rate and rhythm. Systolic murmur. No peripheral edema.    GI: Abdomen with normoactive bowel sounds, soft, non-distended, and non-tender throughout.   Skin: Skin is clean, dry, and intact. No jaundice, lesions, ecchymosis, or erythema on visible skin. Scattered tattoos.    Neurologic: Alert and oriented. Grossly nonfocal.  Moves extremities spontaneously and equally.  Memory and thought process preserved. Steady but occasionally tremulous gait, intention tremor of BUE.   Neuropsychiatric: Calm, affect congruent to situation. Good judgement and insight.     Medications   Current Facility-Administered Medications   Medication Dose Route Frequency Provider Last Rate Last Admin     Current Facility-Administered Medications   Medication Dose Route Frequency Provider Last Rate Last Admin    acyclovir (ZOVIRAX) tablet 400 mg  400 mg Oral BID Nery Crowell PA-C   400 mg at 06/12/25 2019    allopurinol (ZYLOPRIM) tablet 300 mg  300 mg Oral Daily Nery Crowell PA-C   300 mg at 06/12/25 1416    budesonide-formoterol (SYMBICORT/BREYNA) 80-4.5 MCG/ACT inhaler 2 puff  2 puff Inhalation BID  Nery Crowell PA-C   2 puff at 06/12/25 2019    cyanocobalamin (VITAMIN B-12) tablet 500 mcg  500 mcg Oral Daily Nery Crowell PA-C   500 mcg at 06/12/25 1024    cytarabine (PF) (CYTOSAR) 5,730 mg in D5W 332.3 mL infusion  3 g/m2 (Treatment Plan Recorded) Intravenous Q12H Manuel Griffiths .8 mL/hr at 06/13/25 0646 5,730 mg at 06/13/25 0646    dexAMETHasone (DECADRON) tablet 4 mg  4 mg Oral Q12H Manuel Griffiths MD   4 mg at 06/13/25 0604    enoxaparin ANTICOAGULANT (LOVENOX) injection 40 mg  40 mg Subcutaneous Q24H Nery Crowell PA-C   40 mg at 06/12/25 2019    famotidine (PEPCID) tablet 20 mg  20 mg Oral BID Nery Crowell PA-C   20 mg at 06/12/25 2019    [Held by provider] filgrastim-aafi (NIVESTYM) injection 480 mcg  480 mcg Subcutaneous Daily at 8 pm Manuel Griffiths MD        fluticasone (FLONASE) 50 MCG/ACT spray 2 spray  2 spray Both Nostrils BID Nery Crowell PA-C   2 spray at 06/12/25 2019    gabapentin (NEURONTIN) capsule 400 mg  400 mg Oral TID Nery Crowell PA-C   400 mg at 06/12/25 2019    heparin lock flush 10 unit/mL injection 5-15 mL  5-15 mL Intracatheter Q24H Nery Crowell PA-C   5 mL at 06/12/25 1541    lisinopril (ZESTRIL) tablet 10 mg  10 mg Oral Daily Nery Crowell PA-C   10 mg at 06/12/25 1416    loratadine (CLARITIN) tablet 10 mg  10 mg Oral Daily Nery Crowell PA-C   10 mg at 06/12/25 1025    ondansetron (ZOFRAN) tablet 8 mg  8 mg Oral Q12H Manuel Griffiths MD   8 mg at 06/13/25 0604    pantoprazole (PROTONIX) EC tablet 40 mg  40 mg Oral BID  Nery Crowell PA-C   40 mg at 06/12/25 1540    PARoxetine (PAXIL) tablet 20 mg  20 mg Oral Nery Dubose PA-C   20 mg at 06/12/25 1031    posaconazole (NOXAFIL) DR tablet TBEC 300 mg  300 mg Oral Nery Dubose PA-C   300 mg at 06/12/25 1026    prednisoLONE acetate (PRED FORTE) 1 % ophthalmic susp 2 drop  2 drop Both Eyes 4x  Daily Manuel Griffiths MD   2 drop at 06/12/25 2019    sodium chloride (PF) 0.9% PF flush 10-40 mL  10-40 mL Intracatheter Q7 Days Nery Crowell PA-C        sodium chloride (PF) 0.9% PF flush 10-40 mL  10-40 mL Intracatheter Daily Nery Crowell PA-C   10 mL at 06/11/25 1704    sodium chloride (PF) 0.9% PF flush 10-40 mL  10-40 mL Intracatheter Q8H Nery Crowell PA-C   20 mL at 06/12/25 0545    sucralfate (CARAFATE) suspension 1 g  1 g Oral 4x Daily AC & HS Nery Crowell PA-C   1 g at 06/12/25 2344    Vitamin D3 (CHOLECALCIFEROL) tablet 25 mcg  25 mcg Oral Daily Nery Crowell PA-C   25 mcg at 06/12/25 1025     Antiinfectives  Anti-infectives (From now, onward)      Start     Dose/Rate Route Frequency Ordered Stop    06/12/25 0800  posaconazole (NOXAFIL) DR tablet TBEC 300 mg        Note to Pharmacy: PTA Sig:Take 3 tablets (300 mg) by mouth every morning.      300 mg Oral EVERY MORNING 06/11/25 1548      06/11/25 2000  acyclovir (ZOVIRAX) tablet 400 mg        Note to Pharmacy: PTA Sig:Take 1 tablet (400 mg) by mouth 2 times daily.      400 mg Oral 2 TIMES DAILY 06/11/25 1548            Data   CBC   Recent Labs   Lab 06/13/25  0405 06/12/25  0545 06/11/25  1659 06/11/25  1239   WBC 14.9* 5.0 8.5 7.5   RBC 3.00* 3.37* 3.34* 3.49*   HGB 9.2* 10.4* 10.2* 10.6*   HCT 29.3* 33.7* 33.1* 33.8*   MCV 98 100 99 97   MCH 30.7 30.9 30.5 30.4   MCHC 31.4* 30.9* 30.8* 31.4*   RDW 22.3* 22.3* 22.8* 22.8*    395 426 442     CMP   Recent Labs   Lab 06/13/25  0405 06/12/25  0545 06/11/25  1659 06/11/25  1239    141 140 140   POTASSIUM 4.4 4.0 3.4 3.6   CHLORIDE 104 104 100 103   CO2 27 26 27 24   ANIONGAP 10 11 13 13   * 156* 110* 102*   BUN 19.2 13.6 7.6 7.8   CR 0.57 0.60 0.64 0.62   GFRESTIMATED >90 >90 >90 >90   TOBIN 9.4 9.7 9.5 9.8   MAG 2.2 2.2 2.2  --    PHOS 4.3 4.7* 4.0  --    PROTTOTAL 6.4 6.7 6.9 7.2   ALBUMIN 3.6 3.7 3.9 3.9   BILITOTAL 0.3 0.3 0.4 0.4    ALKPHOS 94 105 111 120   AST 18 22 25 21   ALT 11 11 13 10     LFTs   Recent Labs   Lab 06/13/25  0405   PROTTOTAL 6.4   ALBUMIN 3.6   BILITOTAL 0.3   ALKPHOS 94   AST 18   ALT 11     Coagulation Studies   Recent Labs   Lab 06/11/25  1659   INR 1.10

## 2025-06-14 VITALS
SYSTOLIC BLOOD PRESSURE: 156 MMHG | DIASTOLIC BLOOD PRESSURE: 92 MMHG | OXYGEN SATURATION: 97 % | TEMPERATURE: 97.4 F | HEART RATE: 75 BPM | RESPIRATION RATE: 20 BRPM | BODY MASS INDEX: 27.78 KG/M2 | WEIGHT: 177.4 LBS

## 2025-06-14 LAB
ALBUMIN SERPL BCG-MCNC: 3.9 G/DL (ref 3.5–5.2)
ALP SERPL-CCNC: 90 U/L (ref 40–150)
ALT SERPL W P-5'-P-CCNC: 11 U/L (ref 0–50)
ANION GAP SERPL CALCULATED.3IONS-SCNC: 10 MMOL/L (ref 7–15)
AST SERPL W P-5'-P-CCNC: 21 U/L (ref 0–45)
BASOPHILS # BLD AUTO: 0 10E3/UL (ref 0–0.2)
BASOPHILS NFR BLD AUTO: 0 %
BILIRUB SERPL-MCNC: 0.4 MG/DL
BUN SERPL-MCNC: 22.9 MG/DL (ref 6–20)
CALCIUM SERPL-MCNC: 9.6 MG/DL (ref 8.8–10.4)
CHLORIDE SERPL-SCNC: 106 MMOL/L (ref 98–107)
CREAT SERPL-MCNC: 0.53 MG/DL (ref 0.51–0.95)
EGFRCR SERPLBLD CKD-EPI 2021: >90 ML/MIN/1.73M2
EOSINOPHIL # BLD AUTO: 0 10E3/UL (ref 0–0.7)
EOSINOPHIL NFR BLD AUTO: 0 %
ERYTHROCYTE [DISTWIDTH] IN BLOOD BY AUTOMATED COUNT: 22 % (ref 10–15)
GLUCOSE SERPL-MCNC: 144 MG/DL (ref 70–99)
HCO3 SERPL-SCNC: 26 MMOL/L (ref 22–29)
HCT VFR BLD AUTO: 30.1 % (ref 35–47)
HGB BLD-MCNC: 9.3 G/DL (ref 11.7–15.7)
IMM GRANULOCYTES # BLD: 0.1 10E3/UL
IMM GRANULOCYTES NFR BLD: 1 %
LYMPHOCYTES # BLD AUTO: 0.1 10E3/UL (ref 0.8–5.3)
LYMPHOCYTES NFR BLD AUTO: 0 %
MAGNESIUM SERPL-MCNC: 2.3 MG/DL (ref 1.7–2.3)
MCH RBC QN AUTO: 31.2 PG (ref 26.5–33)
MCHC RBC AUTO-ENTMCNC: 30.9 G/DL (ref 31.5–36.5)
MCV RBC AUTO: 101 FL (ref 78–100)
MONOCYTES # BLD AUTO: 0 10E3/UL (ref 0–1.3)
MONOCYTES NFR BLD AUTO: 0 %
NEUTROPHILS # BLD AUTO: 11.6 10E3/UL (ref 1.6–8.3)
NEUTROPHILS NFR BLD AUTO: 98 %
NRBC # BLD AUTO: 0 10E3/UL
NRBC BLD AUTO-RTO: 0 /100
PHOSPHATE SERPL-MCNC: 3.7 MG/DL (ref 2.5–4.5)
PLATELET # BLD AUTO: 291 10E3/UL (ref 150–450)
POTASSIUM SERPL-SCNC: 4.3 MMOL/L (ref 3.4–5.3)
PROT SERPL-MCNC: 6.6 G/DL (ref 6.4–8.3)
RBC # BLD AUTO: 2.98 10E6/UL (ref 3.8–5.2)
SODIUM SERPL-SCNC: 142 MMOL/L (ref 135–145)
URATE SERPL-MCNC: 4 MG/DL (ref 2.4–5.7)
WBC # BLD AUTO: 11.8 10E3/UL (ref 4–11)

## 2025-06-14 PROCEDURE — 83735 ASSAY OF MAGNESIUM: CPT

## 2025-06-14 PROCEDURE — 258N000003 HC RX IP 258 OP 636: Performed by: STUDENT IN AN ORGANIZED HEALTH CARE EDUCATION/TRAINING PROGRAM

## 2025-06-14 PROCEDURE — 84550 ASSAY OF BLOOD/URIC ACID: CPT

## 2025-06-14 PROCEDURE — 80053 COMPREHEN METABOLIC PANEL: CPT

## 2025-06-14 PROCEDURE — 85004 AUTOMATED DIFF WBC COUNT: CPT

## 2025-06-14 PROCEDURE — 250N000013 HC RX MED GY IP 250 OP 250 PS 637

## 2025-06-14 PROCEDURE — 84100 ASSAY OF PHOSPHORUS: CPT

## 2025-06-14 PROCEDURE — 250N000011 HC RX IP 250 OP 636: Performed by: STUDENT IN AN ORGANIZED HEALTH CARE EDUCATION/TRAINING PROGRAM

## 2025-06-14 PROCEDURE — 250N000012 HC RX MED GY IP 250 OP 636 PS 637: Performed by: STUDENT IN AN ORGANIZED HEALTH CARE EDUCATION/TRAINING PROGRAM

## 2025-06-14 RX ORDER — SUCRALFATE ORAL 1 G/10ML
1 SUSPENSION ORAL
COMMUNITY
Start: 2025-06-14

## 2025-06-14 RX ADMIN — GABAPENTIN 400 MG: 300 CAPSULE ORAL at 09:14

## 2025-06-14 RX ADMIN — FLUTICASONE PROPIONATE 2 SPRAY: 50 SPRAY, METERED NASAL at 09:15

## 2025-06-14 RX ADMIN — ACYCLOVIR 400 MG: 400 TABLET ORAL at 09:15

## 2025-06-14 RX ADMIN — PANTOPRAZOLE SODIUM 40 MG: 40 TABLET, DELAYED RELEASE ORAL at 09:14

## 2025-06-14 RX ADMIN — Medication 25 MCG: at 09:14

## 2025-06-14 RX ADMIN — DEXAMETHASONE 4 MG: 4 TABLET ORAL at 05:40

## 2025-06-14 RX ADMIN — BUDESONIDE AND FORMOTEROL FUMARATE DIHYDRATE 2 PUFF: 80; 4.5 AEROSOL RESPIRATORY (INHALATION) at 09:15

## 2025-06-14 RX ADMIN — PREDNISOLONE ACETATE 2 DROP: 10 SUSPENSION/ DROPS OPHTHALMIC at 09:16

## 2025-06-14 RX ADMIN — POSACONAZOLE 300 MG: 100 TABLET, DELAYED RELEASE ORAL at 09:13

## 2025-06-14 RX ADMIN — CYANOCOBALAMIN TAB 500 MCG 500 MCG: 500 TAB at 09:14

## 2025-06-14 RX ADMIN — ALLOPURINOL 300 MG: 300 TABLET ORAL at 09:15

## 2025-06-14 RX ADMIN — CYTARABINE 5730 MG: 100 INJECTION, SOLUTION INTRATHECAL; INTRAVENOUS; SUBCUTANEOUS at 06:13

## 2025-06-14 RX ADMIN — FAMOTIDINE 20 MG: 20 TABLET, FILM COATED ORAL at 09:16

## 2025-06-14 RX ADMIN — ONDANSETRON HYDROCHLORIDE 8 MG: 8 TABLET, FILM COATED ORAL at 05:40

## 2025-06-14 RX ADMIN — PAROXETINE HYDROCHLORIDE 20 MG: 20 TABLET, FILM COATED ORAL at 09:13

## 2025-06-14 RX ADMIN — SUCRALFATE 1 G: 1 SUSPENSION ORAL at 09:13

## 2025-06-14 RX ADMIN — LORATADINE 10 MG: 10 TABLET ORAL at 09:13

## 2025-06-14 RX ADMIN — LISINOPRIL 10 MG: 10 TABLET ORAL at 09:15

## 2025-06-14 RX ADMIN — ACETAMINOPHEN 650 MG: 325 TABLET, FILM COATED ORAL at 09:33

## 2025-06-14 ASSESSMENT — ACTIVITIES OF DAILY LIVING (ADL)
ADLS_ACUITY_SCORE: 42

## 2025-06-14 NOTE — PLAN OF CARE
Goal Outcome Evaluation:      Plan of Care Reviewed With: patient    Overall Patient Progress: no changeOverall Patient Progress: no change    Time    /82 (BP Location: Right arm)   Pulse 76   Temp 97.5  F (36.4  C) (Oral)   Resp 18   Wt 80.5 kg (177 lb 6.4 oz)   LMP 01/01/2007 (Approximate)   SpO2 92%   BMI 27.78 kg/m      Reason for admission: HIDAC  Activity: UAL with cane  Pain: denies  Neuro: alert and oriented. Cerebellar assessment intact  Cardiac: wnl, denies chest pain  Respiratory: denies SOB, no distress observed, on RA  GI/: LBM 6/13, voids spontaneously  Diet: regular  Lines: DL PICC  Wounds:   Labs/imaging: please review lab In the chart      New changes this shift:     Plan:       Continue to monitor and follow POC

## 2025-06-14 NOTE — PLAN OF CARE
Goal Outcome Evaluation:      Plan of Care Reviewed With: patient    Overall Patient Progress: no changeOverall Patient Progress: no change    Outcome Evaluation: Got her last dose C3 cytarabine & tolerate well.    BP (!) 156/92 (BP Location: Right arm)   Pulse 75   Temp 97.4  F (36.3  C) (Oral)   Resp 20   Wt 80.5 kg (177 lb 6.4 oz)   LMP 01/01/2007 (Approximate)   SpO2 97%   BMI 27.78 kg/m      AVSS, denies pain/nausea/sob on RA.  Gave tylenol for mild headache per pt request.   PICC removed post chemo & prior discharge.  UAL, voiding spontaneously, last bm 6/13.  Pt discharging home after chemo, neulasta shot scheduled on 6/16.      Discharge  D: Orders for discharge and outpatient medications written.  I: Home medications and return to clinic schedule reviewed with patient. Discharge instructions and parameters for calling Health Care Provider reviewed. Patient left at 1050 accompanied by herself with sister waiting down at [facility]. PICC removed prior discharge.   A: Patient/family verbalized understanding and was ready for discharge.   P: Patient instructed to  medications in Pharmacy. Follow up as scheduled on 6/16 at clinic for neulasta shot.

## 2025-06-14 NOTE — DISCHARGE SUMMARY
[facility]  Discharge Summary  Hematology / Oncology    Date of Admission:  6/11/2025  Date of Discharge:  06/14/2025  Discharging Provider: Nery Crowell PA-C  Date of Service (when I saw the patient): 06/14/2025    Discharge Diagnoses   # AML with NPM1 mutation  # Anemia  # Risk for pancytopenia  # GERD  # COPD  # Essential hypertension  # Infrarenal abdominal aortic aneurysm  # Stage 2 CKD  # Chronic migraine w/o aura  # Chronic headaches  # BROOKS  # MDD  # At risk for adjustment disorder  # Tobacco use disorder  # Degenerative disc disease  # Deconditioning    History of Present Illness   Alejandra Villegas is a 59 year old female with a past medical history significant for COPD, migraines, rheumatoid arthritis, aortic stenosis, anxiety, and recent diagnosis of AML who was admitted for scheduled consolidation chemotherapy with HiDAC (C3D1=6/11/25). Overall she tolerated this regimen well without any clinical complications. Given recent elevated blood pressure readings, we resumed her antihypertensive this admission and she will have close follow up with her PCP. Denies chest pain, shortness of breath, nausea/vomiting/diarrhea, abdominal pain, skin changes, visual disturbances, fevers, chills.     On day of discharge, patient is functioning well at baseline, seen ambulating the hallways with her cane (baseline).    Prior to discharge, I reviewed with Alejandra the plan of care, including upcoming follow-up appointments and new medications. Appropriate prescriptions were sent to the discharge pharmacy, as needed. We reviewed strict discharge precautions, including reasons to call clinic triage or present to the ED, and they voiced understanding. They were provided with the clinic triage number, as well as written discharge instructions, in their discharge paperwork. Patient had an opportunity to ask questions, all of which were answered to their stated  satisfaction. On the day of discharge, patient was overall well-appearing, hemodynamically stable, and felt safe and comfortable with the plans for discharge to home with follow-up as described.    Discharge Medications  - Lisinopril 10 mg daily for hypertension   - Prednisolone eye drops 4 times daily, last dose on 6/16 AM   - Posaconazole refilled for antifungal prophylaxis   - Acyclovir refilled for antiviral prophylaxis     Follow Up  6/16 Neulasta injection and labs @ [facility].   6/19 [facility] virtual follow up   6/16, 6/18, 6/20, 6/23, 6/25, 6/27, 6/30, 7/3, 7/7 labs @ [facility].   6/25 PCP follow up  7/9 TORSTEN visit + labs prior to admission for cycle 4    To Follow Outpatient  - Discuss ongoing hypertension management with PCP    Next follow-up:  Future Appointments   Date Time Provider Department Center   6/16/2025  1:00 PM GH LAB [facility]   6/16/2025  2:30 PM GH INFUSION CHAIR 2 [facility]   6/18/2025  1:00 PM GH LAB [facility]   6/19/2025 12:30 PM Consuelo Huff, AMARILIS CNP [facility]   6/20/2025  1:40 PM GH LAB [facility]   6/23/2025  1:10 PM GH LAB [facility]   6/25/2025 10:00 AM Bon Lezama DO [facility]   6/25/2025  1:10 PM GH LAB [facility]   6/27/2025  1:00 PM GH LAB [facility]   6/30/2025 12:40 PM GH LAB [facility]   7/3/2025 12:30 PM GH LAB [facility]   7/7/2025 12:40 PM GH LAB [facility]      Hospital Course   Alejandra Villegas was admitted on 6/11/2025.  The following problems were addressed during her hospitalization:    HEME  # AML with NPM1 mutation  Had been seen by PCP Dec 2024 w/ progressive fatigue and nausea where she was found leukopenic WBC  2.4 and neutropenic w/ ANC 0.3. Hgb 12. Was referred to local hematology/oncology clinic for further evaluation and seen by Dr. Samina Harris. BMBx 2/10/25 done at OSH showed hypercellular marrow w/ trilineage hematopoiesis w/ dyspoiesis with  mildly elevated blasts of 4% on morphology. NGS w/ NPM1, IDH2, and NRAS mutations. She was admitted to [facility] 2/26/25 for further workup and management. Slides were re-reviewed by [facility] Hematopathology, who noted hypercellular marrow with 8% blasts by morphology. Despite relatively low blast percentage, findings were felt most consistent with a diagnosis AML with NPM1 mutation by WHO 2022 (which does not require a specific blast threshold). Following interdepartmental discussions and review of the available literature, patient was started on intensive induction with 7+3 (Day 1=3/5/25). Midcycle BMBx 3/19/25 with no morphologic or immunophenotypic evidence of AML. D28 BMBx completed 3/31/25 with hypercellular marrow (60 to 70%) with no overt dysplasia or increase in blasts, noted flow with 4.3% blasts of unusual phenotype; MRD- by NGS. Due to overall disposition timeline as well as patient living in [facility], preceded directly with consolidation chemotherapy with HiDAC (C1D1=4/3/25) which she tolerated well. Completed C2 HiDAC (C2D1=5/6/2025) without complications. Bone marrow biopsy completed s/p 2 cycles of HiDAC on 6/6/25 with no evidence of persistent AML; MRD pending. Now admitted for C3 HiDAC (C3D1=6/11/25).  - PICC placed upon admission, plan to remove prior to discharge  - Baseline cardiopulmonary studies:  - Echo w/ EF 60-65%, mild known aortic stenosis.  - EKG (2/27) with NSR, QTc 480  - CXR (4/27) Small area of new infiltrate versus atelectasis left lung base. No consolidation. R lung clear.  - Baseline viral serologies: CMV IgG+, EBV IgG+, HepB sAg-, HepB cAb-, HepB sAb-, HSV1+/2+, HIV-  - HLA Typing sent on 3/3/2025 and 4/25/2025. BMT Consult on 4/16, no immediate plans for bone marrow transplant.     Treatment plan: HiDAC (C3D1= 6/11/2025)   - Cytarabine 3 g/m  IV q12h D1-3   - Neulasta 6 mg D5 - scheduled at [facility] 6/16  Supportive medications: Zofran 8 mg q12h, Dexamethasone 4 mg q12h  D1-3, Prednisolone eye drops QID D1-5     # Elevated uric acid, resolved  # Elevated phosphorus, resolved  # Risk for TLS, low  Uric acid elevated to 6.5 after 1 dose of Cytarabine. Phosphorus also mildly elevated to 4.7. History of similar elevations with C2 of HiDAC. Cr, Ca, K all WNL.   - Allopurinol 300 mg daily x6/12  - Trend Phos, start Phoslo 667 mg TID if Phos >5.0  - Trend Cr, if evidence of SABRA start maintenance fluids  ml/hr  - Replete electrolytes per protocol     # Anemia  # Risk for pancytopenia  Secondary to underlying hematologic malignancy and exacerbated by recent chemotherapy. Historical pancytopenia has since recovered.  - Follow daily CBC  - Transfuse to keep Hgb >7, plt >10K     ID   # ID prophylaxis  - Acyclovir 400 mg BID  - Levaquin 250 mg daily when ANC <1.0; ANC on admission 4.4  - Posaconazole 300 mg daily - continue given smoking history   - Note: Posaconazole copay $1.60. Trialed initially on vori, then rotated to posa x3/18 given visual hallucinations     # Recent neutropenic fever  # Recent colitis  She presented to OSH ED on 5/18 with neutropenic fever, diarrhea, and abdominal pain and was found to have colitis on CT and RVP positive for coronavirus. She was transferred to [facility] overnight 5/18-5/19. On 5/19 AM, she had improved rapidly after initiation of IV cefepime and Flagyl. She had resolution of fever and improvement in abdominal pain and diarrhea. On 5/20, patient continued to improve with resolution of abdominal pain and diarrhea and resolution of neutropenia, thought to be related to delayed Neulasta effect. Given drastic clinical improvement and resolution of neutropenia, patient was discharged to home to complete course of oral antibiotics.   - Complete resolution of symptoms noted on admission; completed antibiotic regimen as prescribed.      GI  # GERD  CT C/A/P 4/14/2025 with evidence of esophagitis (thought due to reflux/recent chemotherapy) and small hiatal  hernia. Symptoms have historically improved on maximal medical therapy as below.  - Continue PTA Protonix BID, Pepcid BID, Carafate QID, TUMS PRN     PULM  # COPD  Longstanding history of COPD. On admission patient reports symptoms are manageable with no recent exacerbations. Most recent PFTs (2018) were normal.  - Continue PTA Breo Ellipta inhaler and PRN DuoNebs   - Avoid supplemental oxygen unless O2 sat <88%.   - Encourage smoking cessation     CV  # Essential hypertension  Previously on lisinopril 10 mg daily, which was held due to well-managed blood pressures during prior admission. Anticipate resumption during this admission with recent elevated readings.  - Resume lisinopril 10 mg daily x6/12  - Trend BPs  - PCP follow-up on 6/25/25 to discuss ongoing HTN management     # Infrarenal abdominal aortic aneurysm  Noted incidentally on CT C/A/P (3/19/25), measuring 3.4 cm in diameter.  - Attention on follow-up imaging     RENAL/FEN  # Stage 2 CKD  Baseline Cr ~ 0.7. On admission Cr 0.76  - Continue to trend on daily labs and encourage PO hydration     NEURO  # Chronic migraine w/o aura  # Chronic headaches  Present since childhood. Reportedly triggered by neck manipulation/movement. Reportedly well-managed with regimen below. Headaches occurring throughout admission with daily APAP use, also chronic and managed with APAP PRN at home. Patient notes that her headaches throughout prior hospitalizations have been consistent with her typical daily headaches with no reported changes in character, quality, location, severity, or frequency. She denies new associated neurological changes. May consider LP to exclude CNS leukemia, but given this reassuring history and absence of other indications for LP (no hyperleukocytosis, no monocytic phenotype, no FLT3 mutation, etc), this was historically deferred.  - APAP PRN  - Continue PTA sumatriptan and cyclobenzaprine PRN  - Could reconsider need for LP if headache  worsens/changes in character or quality in the future     MISC   # BROOKS  # MDD  # At risk for adjustment disorder  - Continue PTA paroxetine, Atarax PRN     # Tobacco use disorder  Has smoked since age 16, 1.5 ppd (roughly 65 pack years). Attempting to cut back as recently as 01/2025 to 0.5 ppd. We discussed the risks of smoking during induction chemotherapy including increased risk for infection in setting of severe neutropenia that could further complicate course, placing her at risk for severe complications that could be life-threatening or even fatal. Trialed nicotine patch, then self-discontinued after reporting that she felt the nicotine patch precipitated an anxiety attack/episode of chest pain on 3/9. Has been offered a lower dose versus alternative form of NRT, which patient declined.  - Encourage smoking cessation; patient continues to smoke at this time, despite understanding/acknowledgement of the risks. Reports trying to cut down.  - Continue posaconazole ppx, regardless of ANC, given high risk for fungal pulmonary infection in the setting of ongoing tobacco abuse.     # Degenerative disc disease  Appears likely multi-level; no MRI available for review, though note that patient has previously had epidural injections at L3-4 and L5-S1 (2016). Reported taking gabapentin 400 mg TID PRN prior to admission. Mild exacerbation noted 3/29; improved with resumption of gabapentin.  - Continue gabapentin 400 mg TID     # Deconditioning  Using cane regularly for ambulatory assistance.  - Consider PT consult while inpatient     Chronic Problems:  # Vitamin D deficiency - Continue PTA vit D supplement  # Seasonal allergies - Claritin 10 mg daily  # Rheumatoid arthritis - Patient reported trying treatment though was unable to tolerate well. Managed with Tylenol PRN. Most recent RF >650 (2/10/25). JI negative.    # Prediabetes - Last A1c 6.0 x12/9/24. Has been managing with lifestyle modifications.   # Mixed  hyperlipidemia - Lipid panel has remained at goal, 1/14/25 panel w/ cholesterol 163, , LDL 92, HDL 45. - Held PTA atorvastatin on admission  # H/o aortic stenosis - Patient noted on admission longstanding history of aortic stenosis. Pre-chemo echo w/ EF 60-65% and mild aortic stenosis and insufficiency. No previous echo to compare.      Staffed with Dr Moreno.    Nery Crowell PA-C  Hematology/Oncology  Pager: #[phone]    Code Status   Full Code    Primary Care Physician   Physician No Ref-Primary    Physical Exam   Vital Signs with Ranges  Temp:  [97.3  F (36.3  C)-97.9  F (36.6  C)] 97.4  F (36.3  C)  Pulse:  [70-81] 75  Resp:  [16-20] 20  BP: (136-156)/(82-92) 156/92  SpO2:  [92 %-97 %] 97 %  177 lbs 6.4 oz    Constitutional: Pleasant, smiling, and cooperative female. Awake, alert, no apparent distress.  HEENT: NC/AT, EOMI, sclera clear, conjunctiva normal, oral pharynx with moist mucus membranes. Poor dentition.   Respiratory: Breathing comfortably on room air, speaking in full sentences, no evidence of respiratory distress. Lungs without stridor, rhonchi, or rales. Diffuse inspiratory/expiratory wheeze.   Cardiovascular: Regular rate and rhythm. Systolic murmur. No peripheral edema.     GI: Normal bowel sounds, soft, non-distended and non-tender  MSK: No joint effusions or gross deformities.  Skin: No bruising, bleeding, rashes, or lesions   Neurologic:  Answers questions appropriately. Moves all extremities spontaneously. Steady but occasionally tremulous gait, intention tremor of BUE.   Psych: Calm, appropriate affect    Discharge Disposition   Discharged to home  Condition at discharge: Stable    Consultations This Hospital Stay   VASCULAR ACCESS FOR PICC PLACEMENT ADULT IP CONSULT  NURSING TO CONSULT FOR [facility] CARE IP  CARE MANAGEMENT / SOCIAL WORK IP CONSULT    Discharge Orders      Reason for your hospital stay    You were admitted for scheduled chemotherapy for your Acute Myeloid Leukemia.  This was cycle 3 of consolidation, which you tolerated very well.     Activity    Your activity upon discharge: activity as tolerated     ADULT [facility] Follow-up and recommended labs and tests    6/16 Neulasta injection and labs @ [facility].  6/19 [facility] virtual follow up   6/16, 6/18, 6/20, 6/23, 6/25, 6/27, 6/30, 7/3, 7/7 labs @ [facility].  PCP follow up 6/25/25.  [facility]STEN visit + labs prior to admission for cycle 4 on 7/9.    Please follow [facility] for scheduling updates and changes.    Appointments on [place] and/or [facility] (with [facility] or [facility] provider or service). Call [phone] if you haven't heard regarding these appointments within 7 days of discharge.     When to contact your care team    [facility] cancer clinic triage line at [phone] for temp > or = 100.4, uncontrolled nausea/vomiting/diarrhea/constipation, unrelieved pain, bleeding not relieved with pressure, dizziness, chest pain, shortness of breath, loss of consciousness, and any new or concerning symptoms.     Discharge Instructions    New Medications:  - Lisinopril 10 mg daily for hypertension  - Prednisolone eye drops 4 times daily, last dose on 6/16 AM  - Posaconazole refilled for antifungal prophylaxis  - Acyclovir refilled for antiviral prophylaxis    You may take tylenol (acetaminophen) 325 mg every 6-8 hours as needed for pain. Maximum dose of 4000 mg in a 24 hour period. Do not take ibuprofen (advil/motrin), aspirin, or any other NSAIDs (due to low blood counts)    Continue the rest of your medications as you were taking prior to admission.     It was a pleasure to work with you during this admission! Take care, Andrey Crowell PA-C     Diet    Follow this diet upon discharge: Current Diet:Orders Placed This Encounter      Regular Diet Adult     Check Out Appointment Request    Please schedule TORSTEN follow up end of week 6/16, early week 6/23. Virtual OK.   Please schedule admission for cycle 4 on 7/9.  Please schedule  TORSTEN visit and labs prior to cycle 4 (on 7/9 if possible).     Discharge Medications   Current Discharge Medication List        START taking these medications    Details   lisinopril (ZESTRIL) 10 MG tablet Take 1 tablet (10 mg) by mouth daily.  Qty: 30 tablet, Refills: 0    Associated Diagnoses: Acute myeloid leukemia in remission (H)      prednisoLONE acetate (PRED FORTE) 1 % ophthalmic suspension Place 2 drops into both eyes 4 times daily. Last dose 6/16 AM.  Qty: 5 mL, Refills: 0    Associated Diagnoses: 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)           CONTINUE these medications which have CHANGED    Details   acyclovir (ZOVIRAX) 400 MG tablet Take 1 tablet (400 mg) by mouth 2 times daily.  Qty: 120 tablet, Refills: 0    Associated Diagnoses: 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)      posaconazole (NOXAFIL) 100 MG DR tablet Take 3 tablets (300 mg) by mouth every morning.  Qty: 90 tablet, Refills: 0    Associated Diagnoses: Acute myeloid leukemia in remission (H)      !! sucralfate (CARAFATE) 1 GM/10ML suspension Take 10 mLs (1 g) by mouth 4 times daily (before meals and nightly).    Associated Diagnoses: Acute myeloid leukemia in remission (H)       !! - Potential duplicate medications found. Please discuss with provider.        CONTINUE these medications which have NOT CHANGED    Details   acetaminophen (TYLENOL) 325 MG tablet Take 2 tablets (650 mg) by mouth every 4 hours as needed for mild pain.  Qty: 90 tablet, Refills: 0    Associated Diagnoses: Acute myeloid leukemia in remission (H)      albuterol (PROAIR HFA/PROVENTIL HFA/VENTOLIN HFA) 108 (90 Base) MCG/ACT inhaler Inhale 1-2 puffs into the lungs 4 times daily as needed (Refractory bronchospasm associated with hypersensitivity reaction).  Qty: 18 g, Refills: 0    Comments: Pharmacy may dispense brand covered by insurance (Proair, or proventil or ventolin or generic albuterol inhaler)  Associated Diagnoses: Acute myeloid  leukemia not having achieved remission (H)      albuterol (PROVENTIL) (2.5 MG/3ML) 0.083% neb solution Take 1 vial (2.5 mg) by nebulization 4 times daily as needed (Refractory bronchospasm associated with hypersensitivity reaction).  Qty: 90 mL, Refills: 0    Associated Diagnoses: 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)      budesonide-formoterol (SYMBICORT) 80-4.5 MCG/ACT Inhaler Inhale 2 puffs into the lungs 2 times daily - Rinse mouth well after use to prevent Thrush  Qty: 10.2 g, Refills: 11    Associated Diagnoses: COPD exacerbation (H); Pulmonary emphysema, unspecified emphysema type (H)      calcium carbonate (TUMS) 500 MG chewable tablet Take 1 tablet (500 mg) by mouth 3 times daily as needed for heartburn.  Qty: 90 tablet, Refills: 0    Associated Diagnoses: Gastroesophageal reflux disease, unspecified whether esophagitis present      cyanocobalamin (VITAMIN B-12) 500 MCG tablet Take 500 mcg by mouth daily.      cyclobenzaprine (FLEXERIL) 10 MG tablet Take 1 tablet (10 mg) by mouth 3 times daily as needed for muscle spasms.  Qty: 15 tablet, Refills: 0      famotidine (PEPCID) 20 MG tablet Take 1 tablet (20 mg) by mouth 2 times daily.  Qty: 60 tablet, Refills: 0    Associated Diagnoses: Gastroesophageal reflux disease, unspecified whether esophagitis present      fluticasone (FLONASE) 50 MCG/ACT nasal spray Spray 2 sprays into both nostrils 2 times daily.  Qty: 15.8 mL, Refills: 0    Comments: QS, 1 bottle  Associated Diagnoses: Dysfunction of both eustachian tubes; Rhinitis, unspecified type      gabapentin (NEURONTIN) 400 MG capsule Take 1 capsule (400 mg) by mouth 3 times daily.  Qty: 90 capsule, Refills: 0    Associated Diagnoses: Acute myeloid leukemia in remission (H)      hydrOXYzine HCl (ATARAX) 25 MG tablet Take 1-2 tablets (25-50 mg) by mouth every 6 hours as needed for anxiety or itching (adjuvant pain, sleep).  Qty: 90 tablet, Refills: 3    Associated Diagnoses: Acute myeloid  leukemia not having achieved remission (H)      loratadine (CLARITIN) 10 MG tablet Take 1 tablet (10 mg) by mouth daily.  Qty: 30 tablet, Refills: 0    Associated Diagnoses: 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)      ondansetron (ZOFRAN ODT) 4 MG ODT tab Take 1-2 tablets (4-8 mg) by mouth every 8 hours as needed for nausea or vomiting.  Qty: 45 tablet, Refills: 0    Associated Diagnoses: Nausea and vomiting, unspecified vomiting type      pantoprazole (PROTONIX) 40 MG EC tablet Take 1 tablet (40 mg) by mouth 2 times daily (before meals).  Qty: 60 tablet, Refills: 0    Associated Diagnoses: Acute myeloid leukemia in remission (H)      PARoxetine (PAXIL) 20 MG tablet Take 1 tablet (20 mg) by mouth every morning.  Qty: 30 tablet, Refills: 0    Associated Diagnoses: Chronic anxiety      potassium chloride ER (K-TAB) 20 MEQ CR tablet Take 20 mEq by mouth daily.      prochlorperazine (COMPAZINE) 5 MG tablet Take 1 tablet (5 mg) by mouth every 6 hours as needed for nausea or vomiting.  Qty: 30 tablet, Refills: 0    Associated Diagnoses: 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)      rosuvastatin (CRESTOR) 20 MG tablet Take 1 tablet (20 mg) by mouth daily.  Qty: 30 tablet, Refills: 0    Associated Diagnoses: Mixed hyperlipidemia      !! sucralfate (CARAFATE) 1 GM/10ML suspension Take 10 mLs by mouth 3 times daily.      SUMAtriptan (IMITREX) 50 MG tablet Take 1 tablet (50 mg) by mouth at onset of headache for migraine.  Qty: 30 tablet, Refills: 0    Associated Diagnoses: Other migraine without status migrainosus, not intractable      Vitamin D3 (CHOLECALCIFEROL) 25 mcg (1000 units) tablet Take 1 tablet (25 mcg) by mouth daily.  Qty: 30 tablet, Refills: 0    Associated Diagnoses: Vitamin D deficiency      order for DME Equipment being ordered: home neb set up with mask and tubing  Qty: 1 Device, Refills: 0    Associated Diagnoses: Bronchitis, asthmatic, mild persistent, with acute  exacerbation       !! - Potential duplicate medications found. Please discuss with provider.        Allergies   Allergies   Allergen Reactions    Adhesive Tape     Bee Pollen Swelling     Seasonal    Bee Venom Unknown    Folic Acid Itching    Pollen Extract      Seasonal    Amoxicillin Rash    Chlorhexidine Rash     After several weeks, started to develop rash on R neck down to chest and arm. Also noted on back of knees. Providers suggesting related to CHG as nothing else is new for pt.     Liquid Adhesive Rash    Meloxicam Rash    Nabumetone GI Disturbance     GI upset       Data   Most Recent 3 CBC's:  Recent Labs   Lab Test 06/14/25  0624 06/13/25  0405 06/12/25  0545   WBC 11.8* 14.9* 5.0   HGB 9.3* 9.2* 10.4*   * 98 100    328 395      Most Recent 3 BMP's:  Recent Labs   Lab Test 06/14/25  0624 06/13/25  0405 06/12/25  0545    141 141   POTASSIUM 4.3 4.4 4.0   CHLORIDE 106 104 104   CO2 26 27 26   BUN 22.9* 19.2 13.6   CR 0.53 0.57 0.60   ANIONGAP 10 10 11   TOBIN 9.6 9.4 9.7   * 161* 156*     Most Recent 2 LFT's:  Recent Labs   Lab Test 06/14/25 0624 06/13/25  0405   AST 21 18   ALT 11 11   ALKPHOS 90 94   BILITOTAL 0.4 0.3     Most Recent INR's and Anticoagulation Dosing History:  Anticoagulation Dose History  More data exists         Latest Ref Rng & Units 4/4/2025 5/6/2025 5/7/2025 5/8/2025 5/9/2025 5/18/2025 6/11/2025   Recent Dosing and Labs   INR 0.85 - 1.15 1.11  1.11  1.10  1.08  1.07  1.25  1.10      Most Recent 3 Troponin's:No lab results found.  Most Recent Cholesterol Panel:  Recent Labs   Lab Test 01/14/25  1052   CHOL 163   LDL 92   HDL 45*   TRIG 130     Most Recent 6 Bacteria Isolates From Any Culture (See EPIC Reports for Culture Details):No lab results found.  Most Recent TSH, T4 and A1c Labs:  Recent Labs   Lab Test 12/09/24  1513   A1C 6.0*

## 2025-06-14 NOTE — PLAN OF CARE
Goal Outcome Evaluation:      Plan of Care Reviewed With: patient    Overall Patient Progress: improvingOverall Patient Progress: improving       A&Ox4. Afebrile.  VSS on RA. UAL. Denies SOB, dizziness and N/V. PRN tylenol x1. Double PICC HL. One more dose of chemo at 6:30 am and then going home tomorrow. Uses call light appropriately. Voiding spontaneously w/ AUOP. LBM 6/13. Able to make needs known. No acute events during shift.        Continue to monitor and follow POC           Melissa Pereira, RN......6/14/2025 12:11 AM

## 2025-06-14 NOTE — PROGRESS NOTES
Nursing Focus: Chemotherapy  D: Positive blood return via PICC . Insertion site is clean/dry/intact, dressing intact with no complaints of pain.  Pre infusion assessment documented in Flowsheet (if applicable).    I: Premedications given per order (see electronic medical administration record). Dose #6 of CYTARABINE started to infuse over 3 hours. Reviewed pt teaching on chemotherapy side effects.  Pt denies need for further teaching. Chemotherapy double checked per protocol by two chemotherapy competent RN's.   A: Tolerating infusion well. Denies nausea and or pain.   P: Continue to monitor urine output and symptoms of nausea. Screen for symptoms of toxicity.

## 2025-06-16 ENCOUNTER — HOSPITAL ENCOUNTER (OUTPATIENT)
Dept: INFUSION THERAPY | Facility: OTHER | Age: 60
Discharge: HOME | End: 2025-06-16
Attending: INTERNAL MEDICINE
Payer: MEDICARE

## 2025-06-16 ENCOUNTER — LAB (OUTPATIENT)
Dept: LAB | Facility: OTHER | Age: 60
End: 2025-06-16
Payer: MEDICARE

## 2025-06-16 DIAGNOSIS — Z09 HOSPITAL DISCHARGE FOLLOW-UP: ICD-10-CM

## 2025-06-16 DIAGNOSIS — C92.00 ACUTE MYELOID LEUKEMIA NOT HAVING ACHIEVED REMISSION (H): Primary | ICD-10-CM

## 2025-06-16 DIAGNOSIS — C92.00 ACUTE MYELOID LEUKEMIA NOT HAVING ACHIEVED REMISSION (H): ICD-10-CM

## 2025-06-16 LAB
ALBUMIN SERPL BCG-MCNC: 3.8 G/DL (ref 3.5–5.2)
ALP SERPL-CCNC: 84 U/L (ref 40–150)
ALT SERPL W P-5'-P-CCNC: 16 U/L (ref 0–50)
ANION GAP SERPL CALCULATED.3IONS-SCNC: 14 MMOL/L (ref 7–15)
AST SERPL W P-5'-P-CCNC: 22 U/L (ref 0–45)
BASOPHILS # BLD MANUAL: 0 10E3/UL (ref 0–0.2)
BASOPHILS NFR BLD MANUAL: 0 %
BILIRUB SERPL-MCNC: 0.6 MG/DL
BUN SERPL-MCNC: 20.2 MG/DL (ref 6–20)
CALCIUM SERPL-MCNC: 9.2 MG/DL (ref 8.8–10.4)
CHLORIDE SERPL-SCNC: 101 MMOL/L (ref 98–107)
CREAT SERPL-MCNC: 0.74 MG/DL (ref 0.51–0.95)
EGFRCR SERPLBLD CKD-EPI 2021: >90 ML/MIN/1.73M2
EOSINOPHIL # BLD MANUAL: 0 10E3/UL (ref 0–0.7)
EOSINOPHIL NFR BLD MANUAL: 0 %
ERYTHROCYTE [DISTWIDTH] IN BLOOD BY AUTOMATED COUNT: 21.1 % (ref 10–15)
GLUCOSE SERPL-MCNC: 100 MG/DL (ref 70–99)
HCO3 SERPL-SCNC: 25 MMOL/L (ref 22–29)
HCT VFR BLD AUTO: 26 % (ref 35–47)
HGB BLD-MCNC: 8.5 G/DL (ref 11.7–15.7)
LYMPHOCYTES # BLD MANUAL: 0.5 10E3/UL (ref 0.8–5.3)
LYMPHOCYTES NFR BLD MANUAL: 10 %
MCH RBC QN AUTO: 32 PG (ref 26.5–33)
MCHC RBC AUTO-ENTMCNC: 32.7 G/DL (ref 31.5–36.5)
MCV RBC AUTO: 98 FL (ref 78–100)
MONOCYTES # BLD MANUAL: 0 10E3/UL (ref 0–1.3)
MONOCYTES NFR BLD MANUAL: 0 %
NEUTROPHILS # BLD MANUAL: 4.8 10E3/UL (ref 1.6–8.3)
NEUTROPHILS NFR BLD MANUAL: 90 %
PLAT MORPH BLD: NORMAL
PLATELET # BLD AUTO: 204 10E3/UL (ref 150–450)
POTASSIUM SERPL-SCNC: 3.4 MMOL/L (ref 3.4–5.3)
PROT SERPL-MCNC: 6.3 G/DL (ref 6.4–8.3)
RBC # BLD AUTO: 2.66 10E6/UL (ref 3.8–5.2)
RBC MORPH BLD: NORMAL
SODIUM SERPL-SCNC: 140 MMOL/L (ref 135–145)
WBC # BLD AUTO: 5.3 10E3/UL (ref 4–11)

## 2025-06-16 PROCEDURE — 96372 THER/PROPH/DIAG INJ SC/IM: CPT | Performed by: STUDENT IN AN ORGANIZED HEALTH CARE EDUCATION/TRAINING PROGRAM

## 2025-06-16 PROCEDURE — 85007 BL SMEAR W/DIFF WBC COUNT: CPT | Mod: ZL

## 2025-06-16 PROCEDURE — 36415 COLL VENOUS BLD VENIPUNCTURE: CPT | Mod: ZL

## 2025-06-16 PROCEDURE — 80053 COMPREHEN METABOLIC PANEL: CPT | Mod: ZL

## 2025-06-16 PROCEDURE — 96372 THER/PROPH/DIAG INJ SC/IM: CPT

## 2025-06-16 PROCEDURE — 250N000011 HC RX IP 250 OP 636: Mod: JZ | Performed by: STUDENT IN AN ORGANIZED HEALTH CARE EDUCATION/TRAINING PROGRAM

## 2025-06-16 PROCEDURE — 85018 HEMOGLOBIN: CPT | Mod: ZL

## 2025-06-16 RX ADMIN — PEGFILGRASTIM 6 MG: 6 INJECTION SUBCUTANEOUS at 13:36

## 2025-06-16 NOTE — NURSING NOTE
Infusion Nursing Note:  Alejandra Villegas presents today for Neulasta injection.    Patient seen by provider today: No   present during visit today: Not Applicable.    Note: N/A.      Intravenous Access:  Labs drawn without difficulty by .    Treatment Conditions:  Not Applicable.      Post Infusion Assessment:  Patient tolerated injection without incident to RUE.       Discharge Plan:   Discharge instructions reviewed with: Patient.  Patient and/or family verbalized understanding of discharge instructions and all questions answered.  Copy of AVS reviewed with patient and/or family.  No future infusion appt's scheduled at this time. Will return for labs as scheduled.  Patient discharged in stable condition accompanied by: self.  Departure Mode: Ambulatory.      Lianna Crump RN

## 2025-06-17 ENCOUNTER — TELEPHONE (OUTPATIENT)
Dept: PHARMACY | Facility: OTHER | Age: 60
End: 2025-06-17
Payer: MEDICARE

## 2025-06-17 NOTE — TELEPHONE ENCOUNTER
MTM referral from: Transitions of Care (recent hospital discharge, TCU discharge, or ED visit)    MTM referral outreach attempt #1 on June 17, 2025 at 10:08 AM      Outcome: Spoke with patient declined     Use MARIA DEL ROSARIO for the carrier/Plan on the flowsheet    Savana Hudson CMA  MTM

## 2025-06-18 ENCOUNTER — LAB (OUTPATIENT)
Dept: LAB | Facility: OTHER | Age: 60
End: 2025-06-18
Payer: MEDICARE

## 2025-06-18 ENCOUNTER — PATIENT OUTREACH (OUTPATIENT)
Dept: CARE COORDINATION | Facility: CLINIC | Age: 60
End: 2025-06-18

## 2025-06-18 DIAGNOSIS — C92.00 ACUTE MYELOID LEUKEMIA NOT HAVING ACHIEVED REMISSION (H): ICD-10-CM

## 2025-06-18 LAB
ALBUMIN SERPL BCG-MCNC: 4 G/DL (ref 3.5–5.2)
ALP SERPL-CCNC: 96 U/L (ref 40–150)
ALT SERPL W P-5'-P-CCNC: 17 U/L (ref 0–50)
ANION GAP SERPL CALCULATED.3IONS-SCNC: 12 MMOL/L (ref 7–15)
AST SERPL W P-5'-P-CCNC: 18 U/L (ref 0–45)
BASOPHILS # BLD AUTO: 0 10E3/UL (ref 0–0.2)
BASOPHILS NFR BLD AUTO: 2 %
BILIRUB SERPL-MCNC: 1.4 MG/DL
BUN SERPL-MCNC: 21.5 MG/DL (ref 6–20)
CALCIUM SERPL-MCNC: 9.7 MG/DL (ref 8.8–10.4)
CHLORIDE SERPL-SCNC: 103 MMOL/L (ref 98–107)
CREAT SERPL-MCNC: 0.65 MG/DL (ref 0.51–0.95)
EGFRCR SERPLBLD CKD-EPI 2021: >90 ML/MIN/1.73M2
EOSINOPHIL # BLD AUTO: 0 10E3/UL (ref 0–0.7)
EOSINOPHIL NFR BLD AUTO: 0 %
ERYTHROCYTE [DISTWIDTH] IN BLOOD BY AUTOMATED COUNT: 20.2 % (ref 10–15)
GLUCOSE SERPL-MCNC: 108 MG/DL (ref 70–99)
HCO3 SERPL-SCNC: 24 MMOL/L (ref 22–29)
HCT VFR BLD AUTO: 28.6 % (ref 35–47)
HGB BLD-MCNC: 9.3 G/DL (ref 11.7–15.7)
IMM GRANULOCYTES # BLD: 0 10E3/UL
IMM GRANULOCYTES NFR BLD: 3 %
LYMPHOCYTES # BLD AUTO: 0.2 10E3/UL (ref 0.8–5.3)
LYMPHOCYTES NFR BLD AUTO: 19 %
MCH RBC QN AUTO: 31.4 PG (ref 26.5–33)
MCHC RBC AUTO-ENTMCNC: 32.5 G/DL (ref 31.5–36.5)
MCV RBC AUTO: 97 FL (ref 78–100)
MONOCYTES # BLD AUTO: 0 10E3/UL (ref 0–1.3)
MONOCYTES NFR BLD AUTO: 1 %
NEUTROPHILS # BLD AUTO: 0.9 10E3/UL (ref 1.6–8.3)
NEUTROPHILS NFR BLD AUTO: 76 %
NRBC # BLD AUTO: 0 10E3/UL
NRBC BLD AUTO-RTO: 0 /100
PLATELET # BLD AUTO: 146 10E3/UL (ref 150–450)
POTASSIUM SERPL-SCNC: 4.4 MMOL/L (ref 3.4–5.3)
PROT SERPL-MCNC: 7.1 G/DL (ref 6.4–8.3)
RBC # BLD AUTO: 2.96 10E6/UL (ref 3.8–5.2)
SODIUM SERPL-SCNC: 139 MMOL/L (ref 135–145)
WBC # BLD AUTO: 1.1 10E3/UL (ref 4–11)

## 2025-06-18 PROCEDURE — 85025 COMPLETE CBC W/AUTO DIFF WBC: CPT | Mod: ZL

## 2025-06-18 PROCEDURE — 36415 COLL VENOUS BLD VENIPUNCTURE: CPT | Mod: ZL

## 2025-06-18 PROCEDURE — 84295 ASSAY OF SERUM SODIUM: CPT | Mod: ZL

## 2025-06-18 NOTE — PROGRESS NOTES
Clinic Care Coordination Contact  [facility]    Patient is currently driving. She asked that I call her back in an hour or so.     Plan: Patient has been quite busy with her visits. I will check in later. Consuelo Lozano RN on 6/18/2025 at 1:21 PM

## 2025-06-19 ENCOUNTER — VIRTUAL VISIT (OUTPATIENT)
Dept: ONCOLOGY | Facility: CLINIC | Age: 60
End: 2025-06-19
Payer: MEDICARE

## 2025-06-19 VITALS — WEIGHT: 178 LBS | BODY MASS INDEX: 30.39 KG/M2 | HEIGHT: 64 IN

## 2025-06-19 DIAGNOSIS — C92.00 ACUTE MYELOID LEUKEMIA NOT HAVING ACHIEVED REMISSION (H): Primary | ICD-10-CM

## 2025-06-19 DIAGNOSIS — D70.9 NEUTROPENIA, UNSPECIFIED TYPE: ICD-10-CM

## 2025-06-19 PROCEDURE — 98007 SYNCH AUDIO-VIDEO EST HI 40: CPT

## 2025-06-19 PROCEDURE — G2211 COMPLEX E/M VISIT ADD ON: HCPCS

## 2025-06-19 PROCEDURE — 1126F AMNT PAIN NOTED NONE PRSNT: CPT

## 2025-06-19 RX ORDER — CEFPODOXIME PROXETIL 200 MG/1
200 TABLET, FILM COATED ORAL 2 TIMES DAILY
Qty: 120 TABLET | Refills: 0 | Status: SHIPPED | OUTPATIENT
Start: 2025-06-19 | End: 2025-06-25

## 2025-06-19 ASSESSMENT — PAIN SCALES - GENERAL: PAINLEVEL_OUTOF10: NO PAIN (0)

## 2025-06-19 NOTE — PROGRESS NOTES
Virtual Visit Details    Type of service:  Video Visit   Video Start Time: 12:40  Video End Time:1:20    Originating Location (pt. Location): Other in her car    Distant Location (provider location):  On-site  Platform used for Video Visit: Nataly

## 2025-06-19 NOTE — LETTER
"6/19/2025      Alejandra Villegas  [address]  [place] [facility] [place] [address]      Dear Colleague,    Thank you for referring your patient, Alejandra Villegas, to the [facility]. Please see a copy of my visit note below.    Virtual Visit Details    Type of service:  Video Visit   Video Start Time: {video visit start/end time for provider to select:033329}  Video End Time:{video visit start/end time for provider to select:540015}    Originating Location (pt. Location): {video visit patient location:543164::\"Home\"}  {PROVIDER LOCATION On-site should be selected for visits conducted from your clinic location or adjoining [facility] hospital, academic office, or other nearby [facility] building. Off-site should be selected for all other provider locations, including home:422421}  Distant Location (provider location):  {virtual location provider:629846}  Platform used for Video Visit: {Virtual Visit Platforms:155676::\"Powervation\"}    [facility]    HEMATOLOGY & ONCOLOGY  FOLLOW UP    PATIENT NAME: Alejandra Villegas MRN # [id number]  DATE OF VISIT: 06/19/2025 YOB: 1965    REFERRING PROVIDER: Dr. 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 She presents for follow up    PCP Dec 2024 w/ progressive fatigue and nausea where she was found leukopenic WBC 2.4 and neutropenic w/ ANC 0.3. Hgb 12   Referred to local hematology/oncology clinic for further evaluation and seen by Dr. Samina Harris  BMBx 2/10/25 done at OSH showed hypercellular (80-90%) marrow w/ trilineage hematopoiesis w/ dyspoiesis with mildly elevated blasts of 4% on morphology. [facility] pathology read 8% blasts. By WHO 2022 met criteria for AML w/ NPM1 " mutation   NGS: NPM1 (37.5), IDH2 (39.2%), and NRAS (16.2%) mutations  CG: Normal  Induction chemotherapy with 7+3 on 3/5/25  D14 BMBx showing no morpohlogic or immunophenotypic evidence of AML  Day 28 BMBx 3/31/25 showing hypercellular (60-70%) with no over dysplasia or increase in blasts. Flow noted 4.3% blasts of unusual phenotype (increased CD7,partial CD56). Blasts were CD34+ while diagnostic flow showing AML that was negative for CD34)  NGS: No mutations identified   C1 HiDAC (3g/m2) on 4/3/25  C2 HiDAC (3g/m2) on 5/6/25  Brief hospitalization 5/19 - 5/20 for febrile neutropenia, recovered quickly and discharged    SUBJECTIVE  Alejandra presents today for follow up after C3 of HiDAC.     She's feeling a lot of energy, appetite is good. No mouth sores, no nausea or vomiting. A little soft stool but not much. No fevers or chills. Once and a while will start sneezing but doesn't feel like will get a cold. A phlegmy cough, every morning from COPD. No SOB. No chest pain. No lightheadedness or dizziness.     Has an appointment on 6/25 with PCP, her BP has been improving but Alejandra says its still in the 160s/39 (down from 170s earlier). She checks her BP twice a day. Denies HA or vision changes w high BP, denies dizziness.      She'll get nausea, peptobismol and it goes away. If push on belly no pain. No pain or burning w urination. No leg swelling rashes. ROS was otherwise negative.       CURRENT OUTPATIENT MEDICATIONS  Current Outpatient Medications   Medication Sig Dispense Refill     acetaminophen (TYLENOL) 325 MG tablet Take 2 tablets (650 mg) by mouth every 4 hours as needed for mild pain. 90 tablet 0     acyclovir (ZOVIRAX) 400 MG tablet Take 1 tablet (400 mg) by mouth 2 times daily. 120 tablet 0     budesonide-formoterol (SYMBICORT) 80-4.5 MCG/ACT Inhaler Inhale 2 puffs into the lungs 2 times daily - Rinse mouth well after use to prevent Thrush 10.2 g 11     cyanocobalamin (VITAMIN B-12) 500 MCG tablet Take  500 mcg by mouth daily.       famotidine (PEPCID) 20 MG tablet Take 1 tablet (20 mg) by mouth 2 times daily. 60 tablet 0     fluticasone (FLONASE) 50 MCG/ACT nasal spray Spray 2 sprays into both nostrils 2 times daily. 15.8 mL 0     gabapentin (NEURONTIN) 400 MG capsule Take 1 capsule (400 mg) by mouth 3 times daily. 90 capsule 0     lisinopril (ZESTRIL) 10 MG tablet Take 1 tablet (10 mg) by mouth daily. 30 tablet 0     loratadine (CLARITIN) 10 MG tablet Take 1 tablet (10 mg) by mouth daily. 30 tablet 0     pantoprazole (PROTONIX) 40 MG EC tablet Take 1 tablet (40 mg) by mouth 2 times daily (before meals). 60 tablet 0     PARoxetine (PAXIL) 20 MG tablet Take 1 tablet (20 mg) by mouth every morning. 30 tablet 0     posaconazole (NOXAFIL) 100 MG DR tablet Take 3 tablets (300 mg) by mouth every morning. 90 tablet 0     potassium chloride ER (K-TAB) 20 MEQ CR tablet Take 20 mEq by mouth daily.       prednisoLONE acetate (PRED FORTE) 1 % ophthalmic suspension Place 2 drops into both eyes 4 times daily. Last dose 6/16 AM. 5 mL 0     rosuvastatin (CRESTOR) 20 MG tablet Take 1 tablet (20 mg) by mouth daily. 30 tablet 0     sucralfate (CARAFATE) 1 GM/10ML suspension Take 10 mLs (1 g) by mouth 4 times daily (before meals and nightly).       Vitamin D3 (CHOLECALCIFEROL) 25 mcg (1000 units) tablet Take 1 tablet (25 mcg) by mouth daily. 30 tablet 0     albuterol (PROAIR HFA/PROVENTIL HFA/VENTOLIN HFA) 108 (90 Base) MCG/ACT inhaler Inhale 1-2 puffs into the lungs 4 times daily as needed (Refractory bronchospasm associated with hypersensitivity reaction). 18 g 0     albuterol (PROVENTIL) (2.5 MG/3ML) 0.083% neb solution Take 1 vial (2.5 mg) by nebulization 4 times daily as needed (Refractory bronchospasm associated with hypersensitivity reaction). 90 mL 0     calcium carbonate (TUMS) 500 MG chewable tablet Take 1 tablet (500 mg) by mouth 3 times daily as needed for heartburn. 90 tablet 0     cyclobenzaprine (FLEXERIL) 10 MG tablet  "Take 1 tablet (10 mg) by mouth 3 times daily as needed for muscle spasms. 15 tablet 0     hydrOXYzine HCl (ATARAX) 25 MG tablet Take 1-2 tablets (25-50 mg) by mouth every 6 hours as needed for anxiety or itching (adjuvant pain, sleep). 90 tablet 3     ondansetron (ZOFRAN ODT) 4 MG ODT tab Take 1-2 tablets (4-8 mg) by mouth every 8 hours as needed for nausea or vomiting. 45 tablet 0     order for DME Equipment being ordered: home neb set up with mask and tubing 1 Device 0     prochlorperazine (COMPAZINE) 5 MG tablet Take 1 tablet (5 mg) by mouth every 6 hours as needed for nausea or vomiting. 30 tablet 0     sucralfate (CARAFATE) 1 GM/10ML suspension Take 10 mLs by mouth 3 times daily. (Patient not taking: Reported on 6/19/2025)       SUMAtriptan (IMITREX) 50 MG tablet Take 1 tablet (50 mg) by mouth at onset of headache for migraine. 30 tablet 0       ALLERGIES  Allergies   Allergen Reactions     Adhesive Tape      Bee Pollen Swelling     Seasonal     Bee Venom Unknown     Folic Acid Itching     Pollen Extract      Seasonal     Amoxicillin Rash     Chlorhexidine Rash     After several weeks, started to develop rash on R neck down to chest and arm. Also noted on back of knees. Providers suggesting related to CHG as nothing else is new for pt.      Liquid Adhesive Rash     Meloxicam Rash     Nabumetone GI Disturbance     GI upset       REVIEW OF SYSTEMS  A complete ROS was performed and was negative except as mentioned in HPI    PHYSICAL EXAM  Ht 1.626 m (5' 4\")   Wt 80.7 kg (178 lb)   LMP 01/01/2007 (Approximate)   BMI 30.55 kg/m    @LASTSAO2(4)@  Wt Readings from Last 3 Encounters:   06/19/25 80.7 kg (178 lb)   06/12/25 80.5 kg (177 lb 6.4 oz)   06/11/25 81.1 kg (178 lb 12.8 oz)      Latest Reference Range & Units 06/18/25 13:10   Sodium 135 - 145 mmol/L 139   Potassium 3.4 - 5.3 mmol/L 4.4   Chloride 98 - 107 mmol/L 103   Carbon Dioxide (CO2) 22 - 29 mmol/L 24   Urea Nitrogen 6.0 - 20.0 mg/dL 21.5 (H) "   Creatinine 0.51 - 0.95 mg/dL 0.65   GFR Estimate >60 mL/min/1.73m2 >90   Calcium 8.8 - 10.4 mg/dL 9.7   Anion Gap 7 - 15 mmol/L 12   Albumin 3.5 - 5.2 g/dL 4.0   Protein Total 6.4 - 8.3 g/dL 7.1   Alkaline Phosphatase 40 - 150 U/L 96   ALT 0 - 50 U/L 17   AST 0 - 45 U/L 18   Bilirubin Total <=1.2 mg/dL 1.4 (H)   Glucose 70 - 99 mg/dL 108 (H)   (H): Data is abnormally high     Latest Reference Range & Units 06/18/25 13:10   WBC 4.0 - 11.0 10e3/uL 1.1 (L)   Hemoglobin 11.7 - 15.7 g/dL 9.3 (L)   Hematocrit 35.0 - 47.0 % 28.6 (L)   Platelet Count 150 - 450 10e3/uL 146 (L)   RBC Count 3.80 - 5.20 10e6/uL 2.96 (L)   MCV 78 - 100 fL 97   MCH 26.5 - 33.0 pg 31.4   MCHC 31.5 - 36.5 g/dL 32.5   RDW 10.0 - 15.0 % 20.2 (H)   % Neutrophils % 76   % Lymphocytes % 19   % Monocytes % 1   % Eosinophils % 0   % Basophils % 2   % Immature Granulocytes % 3   NRBC/W <1 /100 0   Absolute Neutrophil 1.6 - 8.3 10e3/uL 0.9 (L)   Absolute Lymphocytes 0.8 - 5.3 10e3/uL 0.2 (L)   Absolute Monocytes 0.0 - 1.3 10e3/uL 0.0   Absolute Eosinophils 0.0 - 0.7 10e3/uL 0.0   Absolute Basophils 0.0 - 0.2 10e3/uL 0.0   Absolute Immature Granulocytes <=0.4 10e3/uL 0.0   (L): Data is abnormally low  (H): Data is abnormally high    GENERAL: alert and no distress  EYES: Eyes grossly normal to inspection.  No discharge or erythema, or obvious scleral/conjunctival abnormalities.  RESP: No audible wheeze, cough, or visible cyanosis.    SKIN: Visible skin clear. No significant rash, abnormal pigmentation or lesions.  NEURO: Cranial nerves grossly intact.  Mentation and speech appropriate for age.  PSYCH: Appropriate affect, tone, and pace of words    LABORATORY AND IMAGING STUDIES        BMBx 6/6/25  Final Diagnosis   Bone marrow, posterior iliac crest, right decalcified trephine biopsy, touch imprint, particle crush, direct aspirate smear, concentrated aspirate smear, and peripheral blood smear:     -No morphologic or immunophenotypic evidence of acute  myeloid leukemia  -Hypercellular marrow for age (cellularity estimated at 60-70%) with trilineage hematopoietic maturation, no overt dysplasia, and no increase in blasts  -Peripheral blood showing slight hypochromic, normocytic anemia; lymphopenia and monocytosis; slight thrombocytosis  -See comment   Electronically signed by Anahi Burns MD on 6/10/2025 at 1214 CDT   Comment  [facility]MA[facility]   Concurrent flow cytometry ([id number]) showed No increase in myeloid blasts and no abnormal myeloid blast population     Given the genetic profile of leukemia at the time of diagnosis with NPM1, IDH2 and NRAS mutations, correlation with the results of ancillary tests and clinical findings is recommended for further assessment of residual disease.      Concurrent ancillary studies are in progress and will be reported separately.  Correlation with the results of ancillary tests and clinical findings is recommended.     The red blood cell morphology may not be representative for this patient due to recent red blood cell transfusion.              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 She presents to establish care.    # AML w/ mutated NPM1 in remission - Normal cytogenetics with NPM1, IDH2 and NRAS mutations. MRD negative by NGS on D28 marrow and now s/p 2 cycles of HiDAC consolidation. Her primary oncologist is Dr. Samina Harris at [facility].     She has now tolerated 3 cycles HiDAC well without significant complications other than brief neutropenic fever. Plan for hopefully 4 cycles and if remains MRD negative can monitor for releapse peripherally.     - Neutropenic today (6/19). Had previously been on levofloxacin, given Qtc of ~ 487 in May will start cefpodoxime 200 mg  BID instead for abx coverage. Patient was seen virtually so unable to get updated Qtc. Otherwise advised Alejandra to continue posa and acyclovir.      Ppx - Cefpodoxime, Posa, ACV     - Continue 3x weekly labs w PRN transfusions  - See me 7/9 with admission after for C4.     The longitudinal plan of care for the diagnosis(es)/condition(s) as documented were addressed during this visit. Due to the added complexity in care, I will continue to support Alejandra in the subsequent management and with ongoing continuity of care.    AMARILIS Vick CNP  [facility] [address] [address]  [phone]        Again, thank you for allowing me to participate in the care of your patient.        Sincerely,        AMARILIS Jha CNP    Electronically signed

## 2025-06-19 NOTE — NURSING NOTE
Current patient location: [address] [place] [address] [address] [facility] [address]    Is the patient currently in the state of [place]? YES    Visit mode: VIDEO    If the visit is dropped, the patient can be reconnected by:VIDEO VISIT: Text to cell phone:   Telephone Information:   Mobile [phone]       Will anyone else be joining the visit? NO  (If patient encounters technical issues they should call [phone])    Are changes needed to the allergy or medication list? Pt stated no changes to allergies and Pt stated no med changes    Are refills needed on medications prescribed by this physician? NO    Rooming Documentation:  Unable to complete questionnaire(s) due to time    Reason for visit: RAMON HAYDENF

## 2025-06-19 NOTE — PROGRESS NOTES
[facility]    HEMATOLOGY & ONCOLOGY  FOLLOW UP    PATIENT NAME: Alejandra Villegas MRN # [id number]  DATE OF VISIT: 06/19/2025 YOB: 1965    REFERRING PROVIDER: Dr. 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 She presents for follow up    PCP Dec 2024 w/ progressive fatigue and nausea where she was found leukopenic WBC 2.4 and neutropenic w/ ANC 0.3. Hgb 12   Referred to local hematology/oncology clinic for further evaluation and seen by Dr. Samina Harris  BMBx 2/10/25 done at OSH showed hypercellular (80-90%) marrow w/ trilineage hematopoiesis w/ dyspoiesis with mildly elevated blasts of 4% on morphology. [facility] pathology read 8% blasts. By WHO 2022 met criteria for AML w/ NPM1 mutation   NGS: NPM1 (37.5), IDH2 (39.2%), and NRAS (16.2%) mutations  CG: Normal  Induction chemotherapy with 7+3 on 3/5/25  D14 BMBx showing no morpohlogic or immunophenotypic evidence of AML  Day 28 BMBx 3/31/25 showing hypercellular (60-70%) with no over dysplasia or increase in blasts. Flow noted 4.3% blasts of unusual phenotype (increased CD7,partial CD56). Blasts were CD34+ while diagnostic flow showing AML that was negative for CD34)  NGS: No mutations identified   C1 HiDAC (3g/m2) on 4/3/25  C2 HiDAC (3g/m2) on 5/6/25  Brief hospitalization 5/19 - 5/20 for febrile neutropenia, recovered quickly and discharged    SUBJECTIVE  Alejandra presents today for follow up after C3 of HiDAC.     She's feeling a lot of energy, appetite is good. No mouth sores, no nausea or vomiting. A little soft stool but not much. No fevers or chills. Once and a while will start sneezing but doesn't feel like will get a cold. A phlegmy cough, every morning from  COPD. No SOB. No chest pain. No lightheadedness or dizziness.     Has an appointment on 6/25 with PCP, her BP has been improving but Alejandra says its still in the 160s/39 (down from 170s earlier). She checks her BP twice a day. Denies HA or vision changes w high BP, denies dizziness.      She'll get nausea, peptobismol and it goes away. If push on belly no pain. No pain or burning w urination. No leg swelling rashes. ROS was otherwise negative.       CURRENT OUTPATIENT MEDICATIONS  Current Outpatient Medications   Medication Sig Dispense Refill    acetaminophen (TYLENOL) 325 MG tablet Take 2 tablets (650 mg) by mouth every 4 hours as needed for mild pain. 90 tablet 0    acyclovir (ZOVIRAX) 400 MG tablet Take 1 tablet (400 mg) by mouth 2 times daily. 120 tablet 0    budesonide-formoterol (SYMBICORT) 80-4.5 MCG/ACT Inhaler Inhale 2 puffs into the lungs 2 times daily - Rinse mouth well after use to prevent Thrush 10.2 g 11    cyanocobalamin (VITAMIN B-12) 500 MCG tablet Take 500 mcg by mouth daily.      famotidine (PEPCID) 20 MG tablet Take 1 tablet (20 mg) by mouth 2 times daily. 60 tablet 0    fluticasone (FLONASE) 50 MCG/ACT nasal spray Spray 2 sprays into both nostrils 2 times daily. 15.8 mL 0    gabapentin (NEURONTIN) 400 MG capsule Take 1 capsule (400 mg) by mouth 3 times daily. 90 capsule 0    lisinopril (ZESTRIL) 10 MG tablet Take 1 tablet (10 mg) by mouth daily. 30 tablet 0    loratadine (CLARITIN) 10 MG tablet Take 1 tablet (10 mg) by mouth daily. 30 tablet 0    pantoprazole (PROTONIX) 40 MG EC tablet Take 1 tablet (40 mg) by mouth 2 times daily (before meals). 60 tablet 0    PARoxetine (PAXIL) 20 MG tablet Take 1 tablet (20 mg) by mouth every morning. 30 tablet 0    posaconazole (NOXAFIL) 100 MG DR tablet Take 3 tablets (300 mg) by mouth every morning. 90 tablet 0    potassium chloride ER (K-TAB) 20 MEQ CR tablet Take 20 mEq by mouth daily.      prednisoLONE acetate (PRED FORTE) 1 % ophthalmic suspension  Place 2 drops into both eyes 4 times daily. Last dose 6/16 AM. 5 mL 0    rosuvastatin (CRESTOR) 20 MG tablet Take 1 tablet (20 mg) by mouth daily. 30 tablet 0    sucralfate (CARAFATE) 1 GM/10ML suspension Take 10 mLs (1 g) by mouth 4 times daily (before meals and nightly).      Vitamin D3 (CHOLECALCIFEROL) 25 mcg (1000 units) tablet Take 1 tablet (25 mcg) by mouth daily. 30 tablet 0    albuterol (PROAIR HFA/PROVENTIL HFA/VENTOLIN HFA) 108 (90 Base) MCG/ACT inhaler Inhale 1-2 puffs into the lungs 4 times daily as needed (Refractory bronchospasm associated with hypersensitivity reaction). 18 g 0    albuterol (PROVENTIL) (2.5 MG/3ML) 0.083% neb solution Take 1 vial (2.5 mg) by nebulization 4 times daily as needed (Refractory bronchospasm associated with hypersensitivity reaction). 90 mL 0    calcium carbonate (TUMS) 500 MG chewable tablet Take 1 tablet (500 mg) by mouth 3 times daily as needed for heartburn. 90 tablet 0    cyclobenzaprine (FLEXERIL) 10 MG tablet Take 1 tablet (10 mg) by mouth 3 times daily as needed for muscle spasms. 15 tablet 0    hydrOXYzine HCl (ATARAX) 25 MG tablet Take 1-2 tablets (25-50 mg) by mouth every 6 hours as needed for anxiety or itching (adjuvant pain, sleep). 90 tablet 3    ondansetron (ZOFRAN ODT) 4 MG ODT tab Take 1-2 tablets (4-8 mg) by mouth every 8 hours as needed for nausea or vomiting. 45 tablet 0    order for DME Equipment being ordered: home neb set up with mask and tubing 1 Device 0    prochlorperazine (COMPAZINE) 5 MG tablet Take 1 tablet (5 mg) by mouth every 6 hours as needed for nausea or vomiting. 30 tablet 0    sucralfate (CARAFATE) 1 GM/10ML suspension Take 10 mLs by mouth 3 times daily. (Patient not taking: Reported on 6/19/2025)      SUMAtriptan (IMITREX) 50 MG tablet Take 1 tablet (50 mg) by mouth at onset of headache for migraine. 30 tablet 0       ALLERGIES  Allergies   Allergen Reactions    Adhesive Tape     Bee Pollen Swelling     Seasonal    Bee Venom Unknown  "   Folic Acid Itching    Pollen Extract      Seasonal    Amoxicillin Rash    Chlorhexidine Rash     After several weeks, started to develop rash on R neck down to chest and arm. Also noted on back of knees. Providers suggesting related to CHG as nothing else is new for pt.     Liquid Adhesive Rash    Meloxicam Rash    Nabumetone GI Disturbance     GI upset       REVIEW OF SYSTEMS  A complete ROS was performed and was negative except as mentioned in HPI    PHYSICAL EXAM  Ht 1.626 m (5' 4\")   Wt 80.7 kg (178 lb)   LMP 01/01/2007 (Approximate)   BMI 30.55 kg/m    @LASTSAO2(4)@  Wt Readings from Last 3 Encounters:   06/19/25 80.7 kg (178 lb)   06/12/25 80.5 kg (177 lb 6.4 oz)   06/11/25 81.1 kg (178 lb 12.8 oz)      Latest Reference Range & Units 06/18/25 13:10   Sodium 135 - 145 mmol/L 139   Potassium 3.4 - 5.3 mmol/L 4.4   Chloride 98 - 107 mmol/L 103   Carbon Dioxide (CO2) 22 - 29 mmol/L 24   Urea Nitrogen 6.0 - 20.0 mg/dL 21.5 (H)   Creatinine 0.51 - 0.95 mg/dL 0.65   GFR Estimate >60 mL/min/1.73m2 >90   Calcium 8.8 - 10.4 mg/dL 9.7   Anion Gap 7 - 15 mmol/L 12   Albumin 3.5 - 5.2 g/dL 4.0   Protein Total 6.4 - 8.3 g/dL 7.1   Alkaline Phosphatase 40 - 150 U/L 96   ALT 0 - 50 U/L 17   AST 0 - 45 U/L 18   Bilirubin Total <=1.2 mg/dL 1.4 (H)   Glucose 70 - 99 mg/dL 108 (H)   (H): Data is abnormally high     Latest Reference Range & Units 06/18/25 13:10   WBC 4.0 - 11.0 10e3/uL 1.1 (L)   Hemoglobin 11.7 - 15.7 g/dL 9.3 (L)   Hematocrit 35.0 - 47.0 % 28.6 (L)   Platelet Count 150 - 450 10e3/uL 146 (L)   RBC Count 3.80 - 5.20 10e6/uL 2.96 (L)   MCV 78 - 100 fL 97   MCH 26.5 - 33.0 pg 31.4   MCHC 31.5 - 36.5 g/dL 32.5   RDW 10.0 - 15.0 % 20.2 (H)   % Neutrophils % 76   % Lymphocytes % 19   % Monocytes % 1   % Eosinophils % 0   % Basophils % 2   % Immature Granulocytes % 3   NRBC/W <1 /100 0   Absolute Neutrophil 1.6 - 8.3 10e3/uL 0.9 (L)   Absolute Lymphocytes 0.8 - 5.3 10e3/uL 0.2 (L)   Absolute Monocytes 0.0 - 1.3 " 10e3/uL 0.0   Absolute Eosinophils 0.0 - 0.7 10e3/uL 0.0   Absolute Basophils 0.0 - 0.2 10e3/uL 0.0   Absolute Immature Granulocytes <=0.4 10e3/uL 0.0   (L): Data is abnormally low  (H): Data is abnormally high    GENERAL: alert and no distress  EYES: Eyes grossly normal to inspection.  No discharge or erythema, or obvious scleral/conjunctival abnormalities.  RESP: No audible wheeze, cough, or visible cyanosis.    SKIN: Visible skin clear. No significant rash, abnormal pigmentation or lesions.  NEURO: Cranial nerves grossly intact.  Mentation and speech appropriate for age.  PSYCH: Appropriate affect, tone, and pace of words    LABORATORY AND IMAGING STUDIES        BMBx 6/6/25  Final Diagnosis   Bone marrow, posterior iliac crest, right decalcified trephine biopsy, touch imprint, particle crush, direct aspirate smear, concentrated aspirate smear, and peripheral blood smear:     -No morphologic or immunophenotypic evidence of acute myeloid leukemia  -Hypercellular marrow for age (cellularity estimated at 60-70%) with trilineage hematopoietic maturation, no overt dysplasia, and no increase in blasts  -Peripheral blood showing slight hypochromic, normocytic anemia; lymphopenia and monocytosis; slight thrombocytosis  -See comment   Electronically signed by Anahi Burns MD on 6/10/2025 at 1214 CDT   Comment  [facility]   Concurrent flow cytometry ([id number]) showed No increase in myeloid blasts and no abnormal myeloid blast population     Given the genetic profile of leukemia at the time of diagnosis with NPM1, IDH2 and NRAS mutations, correlation with the results of ancillary tests and clinical findings is recommended for further assessment of residual disease.      Concurrent ancillary studies are in progress and will be reported separately.  Correlation with the results of ancillary tests and clinical findings is recommended.     The red blood cell morphology may not be representative for this patient due to recent  red blood cell transfusion.              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 She presents to [facility] care.    # AML w/ mutated NPM1 in remission - Normal cytogenetics with NPM1, IDH2 and NRAS mutations. MRD negative by NGS on D28 marrow and now s/p 2 cycles of HiDAC consolidation. Her primary oncologist is Dr. Samina Harris at [facility].     She has now tolerated 3 cycles HiDAC well without significant complications other than brief neutropenic fever. Plan for hopefully 4 cycles and if remains MRD negative can monitor for releapse peripherally.     - Neutropenic today (6/19). Had previously been on levofloxacin, given Qtc of ~ 487 in May will start cefpodoxime 200 mg BID instead for abx coverage. Patient was seen virtually so unable to get updated Qtc. Otherwise advised Alejandra to continue posa and acyclovir.      Ppx - Cefpodoxime, Posa, ACV     - Continue 3x weekly labs w PRN transfusions  - See me 7/9 with admission after for C4.     The longitudinal plan of care for the diagnosis(es)/condition(s) as documented were addressed during this visit. Due to the added complexity in care, I will continue to support Alejandra in the subsequent management and with ongoing continuity of care.    AMARILIS Vick CNP  [facility]  [address], [place] [address] [phone]

## 2025-06-20 ENCOUNTER — LAB (OUTPATIENT)
Dept: LAB | Facility: OTHER | Age: 60
End: 2025-06-20
Payer: MEDICARE

## 2025-06-20 ENCOUNTER — TELEPHONE (OUTPATIENT)
Dept: ONCOLOGY | Facility: OTHER | Age: 60
End: 2025-06-20

## 2025-06-20 DIAGNOSIS — C92.00 ACUTE MYELOID LEUKEMIA NOT HAVING ACHIEVED REMISSION (H): ICD-10-CM

## 2025-06-20 LAB
ALBUMIN SERPL BCG-MCNC: 3.8 G/DL (ref 3.5–5.2)
ALP SERPL-CCNC: 112 U/L (ref 40–150)
ALT SERPL W P-5'-P-CCNC: 16 U/L (ref 0–50)
ANION GAP SERPL CALCULATED.3IONS-SCNC: 13 MMOL/L (ref 7–15)
AST SERPL W P-5'-P-CCNC: 16 U/L (ref 0–45)
BILIRUB SERPL-MCNC: 1 MG/DL
BUN SERPL-MCNC: 24.8 MG/DL (ref 6–20)
CALCIUM SERPL-MCNC: 9.8 MG/DL (ref 8.8–10.4)
CHLORIDE SERPL-SCNC: 103 MMOL/L (ref 98–107)
CREAT SERPL-MCNC: 0.79 MG/DL (ref 0.51–0.95)
EGFRCR SERPLBLD CKD-EPI 2021: 87 ML/MIN/1.73M2
ERYTHROCYTE [DISTWIDTH] IN BLOOD BY AUTOMATED COUNT: 19.3 % (ref 10–15)
GLUCOSE SERPL-MCNC: 136 MG/DL (ref 70–99)
HCO3 SERPL-SCNC: 23 MMOL/L (ref 22–29)
HCT VFR BLD AUTO: 25.4 % (ref 35–47)
HGB BLD-MCNC: 8.3 G/DL (ref 11.7–15.7)
MCH RBC QN AUTO: 31.7 PG (ref 26.5–33)
MCHC RBC AUTO-ENTMCNC: 32.7 G/DL (ref 31.5–36.5)
MCV RBC AUTO: 97 FL (ref 78–100)
NRBC # BLD AUTO: 0 10E3/UL
NRBC BLD AUTO-RTO: 0 /100
PLAT MORPH BLD: NORMAL
PLATELET # BLD AUTO: 47 10E3/UL (ref 150–450)
POTASSIUM SERPL-SCNC: 3.8 MMOL/L (ref 3.4–5.3)
PROT SERPL-MCNC: 6.7 G/DL (ref 6.4–8.3)
RBC # BLD AUTO: 2.62 10E6/UL (ref 3.8–5.2)
RBC MORPH BLD: NORMAL
SODIUM SERPL-SCNC: 139 MMOL/L (ref 135–145)
WBC # BLD AUTO: 0.2 10E3/UL (ref 4–11)

## 2025-06-20 PROCEDURE — 85027 COMPLETE CBC AUTOMATED: CPT | Mod: ZL

## 2025-06-20 PROCEDURE — 36415 COLL VENOUS BLD VENIPUNCTURE: CPT | Mod: ZL

## 2025-06-20 PROCEDURE — 82310 ASSAY OF CALCIUM: CPT | Mod: ZL

## 2025-06-20 NOTE — TELEPHONE ENCOUNTER
DATE/TIME OF CALL RECEIVED FROM LAB:  06/20/25 at 1:08 PM   LAB TEST:  CBC  LAB VALUE:   WBC = 0.2    PLT = 45    ANC = 0.0  PROVIDER NOTIFIED?: Yes  PROVIDER NAME: Kimberly  DATE/TIME LAB VALUE REPORTED TO PROVIDER: .today  MECHANISM OF PROVIDER NOTIFICATION: Face-To-Face  PROVIDER RESPONSE: Dr Harris will review.  Nilda Chavis CMA([facility])..................6/20/2025   1:09 PM

## 2025-06-21 NOTE — TELEPHONE ENCOUNTER
Pt called triage today regarding phone call from yesterday as she missed the call.  Discussed provider note from yesterday, 6/20/2025, and patient did confirm that she is still taking antibiotics and is using neutropenic precautions.  She will call clinic on Monday for further instructions.

## 2025-06-22 ENCOUNTER — HEALTH MAINTENANCE LETTER (OUTPATIENT)
Age: 60
End: 2025-06-22

## 2025-06-22 LAB
ABO + RH BLD: NORMAL
BLD GP AB SCN SERPL QL: NEGATIVE
SPECIMEN EXP DATE BLD: NORMAL

## 2025-06-23 ENCOUNTER — TELEPHONE (OUTPATIENT)
Facility: CLINIC | Age: 60
End: 2025-06-23

## 2025-06-23 ENCOUNTER — HOSPITAL ENCOUNTER (OUTPATIENT)
Facility: OTHER | Age: 60
Discharge: HOME | DRG: 871 | End: 2025-06-23
Attending: FAMILY MEDICINE | Admitting: FAMILY MEDICINE
Payer: MEDICARE

## 2025-06-23 ENCOUNTER — LAB (OUTPATIENT)
Dept: LAB | Facility: OTHER | Age: 60
End: 2025-06-23
Payer: MEDICARE

## 2025-06-23 ENCOUNTER — ANESTHESIA EVENT (OUTPATIENT)
Dept: MEDSURG UNIT | Facility: OTHER | Age: 60
End: 2025-06-23
Payer: MEDICARE

## 2025-06-23 ENCOUNTER — ANESTHESIA (OUTPATIENT)
Dept: MEDSURG UNIT | Facility: OTHER | Age: 60
End: 2025-06-23
Payer: MEDICARE

## 2025-06-23 ENCOUNTER — TELEPHONE (OUTPATIENT)
Dept: ONCOLOGY | Facility: OTHER | Age: 60
End: 2025-06-23

## 2025-06-23 VITALS
TEMPERATURE: 100.1 F | RESPIRATION RATE: 18 BRPM | SYSTOLIC BLOOD PRESSURE: 128 MMHG | DIASTOLIC BLOOD PRESSURE: 67 MMHG | OXYGEN SATURATION: 97 % | HEART RATE: 92 BPM

## 2025-06-23 DIAGNOSIS — D53.9 MACROCYTIC ANEMIA: Primary | ICD-10-CM

## 2025-06-23 DIAGNOSIS — C92.00 ACUTE MYELOID LEUKEMIA NOT HAVING ACHIEVED REMISSION (H): ICD-10-CM

## 2025-06-23 DIAGNOSIS — C92.00 ACUTE MYELOID LEUKEMIA NOT HAVING ACHIEVED REMISSION (H): Primary | ICD-10-CM

## 2025-06-23 LAB
ADDITIONAL COMMENTS: NORMAL
ALBUMIN SERPL BCG-MCNC: 3.7 G/DL (ref 3.5–5.2)
ALP SERPL-CCNC: 126 U/L (ref 40–150)
ALT SERPL W P-5'-P-CCNC: 12 U/L (ref 0–50)
ANION GAP SERPL CALCULATED.3IONS-SCNC: 13 MMOL/L (ref 7–15)
AST SERPL W P-5'-P-CCNC: 13 U/L (ref 0–45)
BASOPHILS # BLD MANUAL: 0 10E3/UL (ref 0–0.2)
BASOPHILS NFR BLD MANUAL: 0 %
BILIRUB SERPL-MCNC: 0.7 MG/DL
BLD PROD TYP BPU: NORMAL
BLD PROD TYP BPU: NORMAL
BLOOD COMPONENT TYPE: NORMAL
BLOOD COMPONENT TYPE: NORMAL
BUN SERPL-MCNC: 9.6 MG/DL (ref 6–20)
CALCIUM SERPL-MCNC: 9.4 MG/DL (ref 8.8–10.4)
CHLORIDE SERPL-SCNC: 101 MMOL/L (ref 98–107)
CODING SYSTEM: NORMAL
CODING SYSTEM: NORMAL
CREAT SERPL-MCNC: 0.61 MG/DL (ref 0.51–0.95)
EGFRCR SERPLBLD CKD-EPI 2021: >90 ML/MIN/1.73M2
EOSINOPHIL # BLD MANUAL: 0.1 10E3/UL (ref 0–0.7)
EOSINOPHIL NFR BLD MANUAL: 12 %
ERYTHROCYTE [DISTWIDTH] IN BLOOD BY AUTOMATED COUNT: 18.1 % (ref 10–15)
GLUCOSE SERPL-MCNC: 110 MG/DL (ref 70–99)
HCO3 SERPL-SCNC: 23 MMOL/L (ref 22–29)
HCT VFR BLD AUTO: 22.9 % (ref 35–47)
HGB BLD-MCNC: 7.6 G/DL (ref 11.7–15.7)
INTERPRETATION: NORMAL
ISCN: NORMAL
ISSUE DATE AND TIME: NORMAL
ISSUE DATE AND TIME: NORMAL
LYMPHOCYTES # BLD MANUAL: 0.2 10E3/UL (ref 0.8–5.3)
LYMPHOCYTES NFR BLD MANUAL: 34 %
MCH RBC QN AUTO: 31.7 PG (ref 26.5–33)
MCHC RBC AUTO-ENTMCNC: 33.2 G/DL (ref 31.5–36.5)
MCV RBC AUTO: 95 FL (ref 78–100)
METHODS: NORMAL
MONOCYTES # BLD MANUAL: 0.1 10E3/UL (ref 0–1.3)
MONOCYTES NFR BLD MANUAL: 12 %
NEUTROPHILS # BLD MANUAL: 0.3 10E3/UL (ref 1.6–8.3)
NEUTROPHILS NFR BLD MANUAL: 42 %
PLAT MORPH BLD: NORMAL
PLATELET # BLD AUTO: 2 10E3/UL (ref 150–450)
POTASSIUM SERPL-SCNC: 2.9 MMOL/L (ref 3.4–5.3)
PROT SERPL-MCNC: 7.2 G/DL (ref 6.4–8.3)
RBC # BLD AUTO: 2.4 10E6/UL (ref 3.8–5.2)
RBC MORPH BLD: NORMAL
SODIUM SERPL-SCNC: 137 MMOL/L (ref 135–145)
UNIT ABO/RH: NORMAL
UNIT ABO/RH: NORMAL
UNIT NUMBER: NORMAL
UNIT NUMBER: NORMAL
UNIT STATUS: NORMAL
UNIT STATUS: NORMAL
UNIT TYPE ISBT: 5100
UNIT TYPE ISBT: 5100
WBC # BLD AUTO: 0.7 10E3/UL (ref 4–11)

## 2025-06-23 PROCEDURE — 36415 COLL VENOUS BLD VENIPUNCTURE: CPT | Mod: ZL

## 2025-06-23 PROCEDURE — 36430 TRANSFUSION BLD/BLD COMPNT: CPT

## 2025-06-23 PROCEDURE — P9037 PLATE PHERES LEUKOREDU IRRAD: HCPCS | Performed by: NURSE PRACTITIONER

## 2025-06-23 PROCEDURE — 85007 BL SMEAR W/DIFF WBC COUNT: CPT | Mod: ZL

## 2025-06-23 PROCEDURE — 85027 COMPLETE CBC AUTOMATED: CPT | Mod: ZL

## 2025-06-23 PROCEDURE — 36410 VNPNXR 3YR/> PHY/QHP DX/THER: CPT

## 2025-06-23 PROCEDURE — 250N000009 HC RX 250

## 2025-06-23 PROCEDURE — 86900 BLOOD TYPING SEROLOGIC ABO: CPT

## 2025-06-23 PROCEDURE — 80053 COMPREHEN METABOLIC PANEL: CPT | Mod: ZL

## 2025-06-23 PROCEDURE — P9035 PLATELET PHERES LEUKOREDUCED: HCPCS | Mod: XU | Performed by: NURSE PRACTITIONER

## 2025-06-23 RX ORDER — EPINEPHRINE 1 MG/ML
0.3 INJECTION, SOLUTION, CONCENTRATE INTRAVENOUS EVERY 5 MIN PRN
Status: CANCELLED | OUTPATIENT
Start: 2025-06-23

## 2025-06-23 RX ORDER — HEPARIN SODIUM,PORCINE 10 UNIT/ML
5-20 VIAL (ML) INTRAVENOUS DAILY PRN
Status: CANCELLED | OUTPATIENT
Start: 2025-06-23

## 2025-06-23 RX ORDER — LIDOCAINE HYDROCHLORIDE 10 MG/ML
INJECTION, SOLUTION INFILTRATION; PERINEURAL PRN
Status: DISCONTINUED | OUTPATIENT
Start: 2025-06-23 | End: 2025-06-23

## 2025-06-23 RX ORDER — HEPARIN SODIUM (PORCINE) LOCK FLUSH IV SOLN 100 UNIT/ML 100 UNIT/ML
5 SOLUTION INTRAVENOUS
Status: CANCELLED | OUTPATIENT
Start: 2025-06-23

## 2025-06-23 RX ORDER — DIPHENHYDRAMINE HYDROCHLORIDE 50 MG/ML
50 INJECTION, SOLUTION INTRAMUSCULAR; INTRAVENOUS
Status: CANCELLED
Start: 2025-06-23

## 2025-06-23 RX ADMIN — LIDOCAINE HYDROCHLORIDE 1 ML: 10 INJECTION, SOLUTION INFILTRATION; PERINEURAL at 19:10

## 2025-06-23 ASSESSMENT — ACTIVITIES OF DAILY LIVING (ADL)
ADLS_ACUITY_SCORE: 58

## 2025-06-23 NOTE — TELEPHONE ENCOUNTER
DATE/TIME OF CALL RECEIVED FROM LAB:  06/23/25 at 1:35 PM   LAB TEST:  platelets 2, ANC 0.3  PROVIDER NOTIFIED?: Yes  PROVIDER NAME: Magalis Lopez APRN CNP    DATE/TIME LAB VALUE REPORTED TO PROVIDER: 1:36 PM   MECHANISM OF PROVIDER NOTIFICATION: phone note  PROVIDER RESPONSE:     Will arrange for platelet transfusion and advise her to follow neutropenic precautions and continue antibiotics.  Spoke with [place] and it is fine to increase to 2 units  Lisha Winkler RN...........6/23/2025 1:57 PM

## 2025-06-23 NOTE — TELEPHONE ENCOUNTER
Lisha from [facility] is calling to report that pt has plt level of 2. Their NP wants to ask if okay to give two units plts vs one unit.   1417: Secure chat sent to MAC and Savana Huff   1432: Paged via [employer].  1441: Per Consuelo Huff CNP, okay to give two units of platelets.  This writer called Lisha at [facility] and advised per Consuelo Huff CNP. Lisha voiced understanding.   Routed to Care Team.

## 2025-06-24 ENCOUNTER — HOSPITAL ENCOUNTER (EMERGENCY)
Facility: OTHER | Age: 60
Discharge: HOME | End: 2025-06-24
Payer: MEDICARE

## 2025-06-24 ENCOUNTER — PATIENT OUTREACH (OUTPATIENT)
Dept: FAMILY MEDICINE | Facility: OTHER | Age: 60
End: 2025-06-24
Payer: MEDICARE

## 2025-06-24 ENCOUNTER — NURSE TRIAGE (OUTPATIENT)
Dept: NURSING | Facility: CLINIC | Age: 60
End: 2025-06-24
Payer: MEDICARE

## 2025-06-24 NOTE — TELEPHONE ENCOUNTER
Nurse Triage SBAR    Is this a 2nd Level Triage? NO    Situation:  IV site reaction    Background: Call from patient who had a platelett injection today at around 1700. She states that she now has a lump in her arm. Patient states that the area is approx the size of a golf ball. It is hard to touch, painful and is red and the arm is swelling up as well.     Assessment: Call from patient who had a platelett injection today at around 1700. She states that she now has a lump in her arm. Patient states that the area is approx the size of a golf ball. It is hard to touch, painful and is red and the arm is swelling up as well.     Protocol Recommended Disposition:   No disposition on file.    Recommendation: patient advised to present to ED           Does the patient meet one of the following criteria for ADS visit consideration? 16+ years old, with an [facility] PCP     TIP  Providers, please consider if this condition is appropriate for management at one of our Acute and Diagnostic Services sites.     If patient is a good candidate, please use dotphrase <dot>triageresponse and select Refer to ADS to document.        Reason for Disposition   Arm is swollen, new onset (or leg swelling if IV in lower extremity)    Additional Information   Negative: Severe difficulty breathing (e.g., struggling for each breath, speaks in single words)   Negative: Shock suspected (e.g., cold/pale/clammy skin, too weak to stand, low BP, rapid pulse)   Negative: Sounds like a life-threatening emergency to the triager   Negative: [1] Difficulty breathing AND [2] not severe    Protocols used: IV Site (Skin) Symptoms-A-AH

## 2025-06-24 NOTE — PROGRESS NOTES
"Patients infusion completed. Flushed IV. Vitals obtained, /67 (BP Location: Right arm, Patient Position: Sitting)   Pulse 92   Temp 100.1  F (37.8  C) (Tympanic)   Resp 18   LMP 01/01/2007 (Approximate)   SpO2 97%     Temp increased to 100.1, patient reports that she has had a low grade fever and she is directed to take Tylenol when this happens. Does not need to be seen unless she \"feels crummy\". Reported to charge RN. Okay for patient to discharge with these parameters in place and patient knowing when to return.     Lon Mathur RN 06/23/25 10:06 PM      "
Discharge Note      Data:  Alejandra Villegas discharged to home at 2155 via ambulation. Accompanied by friend and staff.    Action:  Written discharge/follow-up instructions were provided to patient. Prescriptions - None ordered for discharge. All belongings sent with patient.    Response:  Alejandra verbalized understanding of discharge instructions, reason for discharge, and necessary follow-up appointments. Without further questions or concerns at this time.     Lon Mathur RN 06/23/25 10:02 PM      
Patient arrived for platelet infusion. Awaiting platelets to arrive to facility. Patient aware. Dinner ordered for patient. Oriented to call light system and room. Ambulating independently in room.     Vida Vivas RN .......  6/23/2025  4:40 PM    
18

## 2025-06-24 NOTE — TELEPHONE ENCOUNTER
Patient admitted for infusion services. No TCM call required per policy.  Kathleen Bishop RN on 6/24/2025 at 9:23 AM

## 2025-06-25 ENCOUNTER — HOSPITAL ENCOUNTER (OUTPATIENT)
Facility: OTHER | Age: 60
Discharge: HOME | DRG: 871 | End: 2025-06-25
Attending: FAMILY MEDICINE | Admitting: FAMILY MEDICINE
Payer: MEDICARE

## 2025-06-25 ENCOUNTER — OFFICE VISIT (OUTPATIENT)
Dept: FAMILY MEDICINE | Facility: OTHER | Age: 60
End: 2025-06-25
Attending: FAMILY MEDICINE
Payer: MEDICARE

## 2025-06-25 ENCOUNTER — DOCUMENTATION ONLY (OUTPATIENT)
Dept: OTHER | Facility: CLINIC | Age: 60
End: 2025-06-25

## 2025-06-25 ENCOUNTER — HOSPITAL ENCOUNTER (INPATIENT)
Facility: OTHER | Age: 60
LOS: 1 days | Discharge: HOME | End: 2025-06-26
Attending: EMERGENCY MEDICINE | Admitting: FAMILY MEDICINE
Payer: MEDICARE

## 2025-06-25 ENCOUNTER — TELEPHONE (OUTPATIENT)
Dept: ONCOLOGY | Facility: OTHER | Age: 60
End: 2025-06-25

## 2025-06-25 ENCOUNTER — LAB (OUTPATIENT)
Dept: LAB | Facility: OTHER | Age: 60
End: 2025-06-25
Payer: MEDICARE

## 2025-06-25 ENCOUNTER — APPOINTMENT (OUTPATIENT)
Dept: CT IMAGING | Facility: OTHER | Age: 60
DRG: 871 | End: 2025-06-25
Attending: EMERGENCY MEDICINE
Payer: MEDICARE

## 2025-06-25 ENCOUNTER — APPOINTMENT (OUTPATIENT)
Dept: GENERAL RADIOLOGY | Facility: OTHER | Age: 60
DRG: 871 | End: 2025-06-25
Attending: EMERGENCY MEDICINE
Payer: MEDICARE

## 2025-06-25 VITALS
DIASTOLIC BLOOD PRESSURE: 80 MMHG | WEIGHT: 176.6 LBS | HEART RATE: 80 BPM | BODY MASS INDEX: 30.31 KG/M2 | OXYGEN SATURATION: 96 % | TEMPERATURE: 97.6 F | RESPIRATION RATE: 16 BRPM | SYSTOLIC BLOOD PRESSURE: 135 MMHG

## 2025-06-25 VITALS — SYSTOLIC BLOOD PRESSURE: 140 MMHG | DIASTOLIC BLOOD PRESSURE: 69 MMHG | TEMPERATURE: 102.7 F | OXYGEN SATURATION: 91 %

## 2025-06-25 DIAGNOSIS — H69.93 DYSFUNCTION OF BOTH EUSTACHIAN TUBES: ICD-10-CM

## 2025-06-25 DIAGNOSIS — G93.40 ENCEPHALOPATHY, UNSPECIFIED TYPE: ICD-10-CM

## 2025-06-25 DIAGNOSIS — C92.00 ACUTE MYELOID LEUKEMIA NOT HAVING ACHIEVED REMISSION (H): ICD-10-CM

## 2025-06-25 DIAGNOSIS — R11.2 NAUSEA AND VOMITING, UNSPECIFIED VOMITING TYPE: ICD-10-CM

## 2025-06-25 DIAGNOSIS — D69.6 THROMBOCYTOPENIA: ICD-10-CM

## 2025-06-25 DIAGNOSIS — F41.9 CHRONIC ANXIETY: ICD-10-CM

## 2025-06-25 DIAGNOSIS — R65.20 SEPSIS WITH ENCEPHALOPATHY WITHOUT SEPTIC SHOCK, DUE TO UNSPECIFIED ORGANISM (H): ICD-10-CM

## 2025-06-25 DIAGNOSIS — J31.0 RHINITIS, UNSPECIFIED TYPE: ICD-10-CM

## 2025-06-25 DIAGNOSIS — A41.9 SEPSIS WITH ENCEPHALOPATHY WITHOUT SEPTIC SHOCK, DUE TO UNSPECIFIED ORGANISM (H): ICD-10-CM

## 2025-06-25 DIAGNOSIS — D53.9 MACROCYTIC ANEMIA: Primary | ICD-10-CM

## 2025-06-25 DIAGNOSIS — E55.9 VITAMIN D DEFICIENCY: ICD-10-CM

## 2025-06-25 DIAGNOSIS — A41.9 UNSPECIFIED SEPTICEMIA(038.9) (H): Primary | ICD-10-CM

## 2025-06-25 DIAGNOSIS — C92.00 ACUTE MYELOID LEUKEMIA NOT HAVING ACHIEVED REMISSION (H): Primary | ICD-10-CM

## 2025-06-25 DIAGNOSIS — E78.2 MIXED HYPERLIPIDEMIA: ICD-10-CM

## 2025-06-25 DIAGNOSIS — C92.01 ACUTE MYELOID LEUKEMIA IN REMISSION (H): ICD-10-CM

## 2025-06-25 DIAGNOSIS — E87.6 HYPOKALEMIA: ICD-10-CM

## 2025-06-25 DIAGNOSIS — G93.41 SEPSIS WITH ENCEPHALOPATHY WITHOUT SEPTIC SHOCK, DUE TO UNSPECIFIED ORGANISM (H): ICD-10-CM

## 2025-06-25 DIAGNOSIS — K21.9 GASTROESOPHAGEAL REFLUX DISEASE, UNSPECIFIED WHETHER ESOPHAGITIS PRESENT: ICD-10-CM

## 2025-06-25 DIAGNOSIS — M62.838 MUSCLE SPASM: ICD-10-CM

## 2025-06-25 LAB
ALBUMIN SERPL BCG-MCNC: 3.6 G/DL (ref 3.5–5.2)
ALBUMIN SERPL BCG-MCNC: 3.7 G/DL (ref 3.5–5.2)
ALBUMIN UR-MCNC: 20 MG/DL
ALP SERPL-CCNC: 169 U/L (ref 40–150)
ALP SERPL-CCNC: 174 U/L (ref 40–150)
ALT SERPL W P-5'-P-CCNC: 14 U/L (ref 0–50)
ALT SERPL W P-5'-P-CCNC: 14 U/L (ref 0–50)
ANION GAP SERPL CALCULATED.3IONS-SCNC: 14 MMOL/L (ref 7–15)
ANION GAP SERPL CALCULATED.3IONS-SCNC: 18 MMOL/L (ref 7–15)
APPEARANCE UR: CLEAR
AST SERPL W P-5'-P-CCNC: 22 U/L (ref 0–45)
AST SERPL W P-5'-P-CCNC: 25 U/L (ref 0–45)
BASE EXCESS BLDV CALC-SCNC: 3.6 MMOL/L (ref -3–3)
BASOPHILS # BLD MANUAL: 0 10E3/UL (ref 0–0.2)
BASOPHILS # BLD MANUAL: 0 10E3/UL (ref 0–0.2)
BASOPHILS NFR BLD MANUAL: 0 %
BASOPHILS NFR BLD MANUAL: 0 %
BILIRUB SERPL-MCNC: 0.3 MG/DL
BILIRUB SERPL-MCNC: 0.4 MG/DL
BILIRUB UR QL STRIP: NEGATIVE
BLD PROD TYP BPU: NORMAL
BLD PROD TYP BPU: NORMAL
BLOOD COMPONENT TYPE: NORMAL
BLOOD COMPONENT TYPE: NORMAL
BUN SERPL-MCNC: 11.7 MG/DL (ref 6–20)
BUN SERPL-MCNC: 9.1 MG/DL (ref 6–20)
CALCIUM SERPL-MCNC: 9.5 MG/DL (ref 8.8–10.4)
CALCIUM SERPL-MCNC: 9.7 MG/DL (ref 8.8–10.4)
CHLORIDE SERPL-SCNC: 101 MMOL/L (ref 98–107)
CHLORIDE SERPL-SCNC: 98 MMOL/L (ref 98–107)
CODING SYSTEM: NORMAL
CODING SYSTEM: NORMAL
COLOR UR AUTO: ABNORMAL
CREAT SERPL-MCNC: 0.64 MG/DL (ref 0.51–0.95)
CREAT SERPL-MCNC: 0.67 MG/DL (ref 0.51–0.95)
CROSSMATCH: NORMAL
CROSSMATCH: NORMAL
EGFRCR SERPLBLD CKD-EPI 2021: >90 ML/MIN/1.73M2
EGFRCR SERPLBLD CKD-EPI 2021: >90 ML/MIN/1.73M2
EOSINOPHIL # BLD MANUAL: 0.1 10E3/UL (ref 0–0.7)
EOSINOPHIL # BLD MANUAL: 0.2 10E3/UL (ref 0–0.7)
EOSINOPHIL NFR BLD MANUAL: 1 %
EOSINOPHIL NFR BLD MANUAL: 3 %
ERYTHROCYTE [DISTWIDTH] IN BLOOD BY AUTOMATED COUNT: 18.6 % (ref 10–15)
ERYTHROCYTE [DISTWIDTH] IN BLOOD BY AUTOMATED COUNT: 18.7 % (ref 10–15)
FLUAV RNA SPEC QL NAA+PROBE: NEGATIVE
FLUBV RNA RESP QL NAA+PROBE: NEGATIVE
GLUCOSE SERPL-MCNC: 115 MG/DL (ref 70–99)
GLUCOSE SERPL-MCNC: 121 MG/DL (ref 70–99)
GLUCOSE UR STRIP-MCNC: NEGATIVE MG/DL
HCO3 BLDV-SCNC: 27 MMOL/L (ref 21–28)
HCO3 SERPL-SCNC: 23 MMOL/L (ref 22–29)
HCO3 SERPL-SCNC: 27 MMOL/L (ref 22–29)
HCT VFR BLD AUTO: 17.8 % (ref 35–47)
HCT VFR BLD AUTO: 20.3 % (ref 35–47)
HGB BLD-MCNC: 6 G/DL (ref 11.7–15.7)
HGB BLD-MCNC: 6.4 G/DL (ref 11.7–15.7)
HGB BLD-MCNC: 6.6 G/DL (ref 11.7–15.7)
HGB UR QL STRIP: ABNORMAL
HOLD SPECIMEN: NORMAL
ISSUE DATE AND TIME: NORMAL
ISSUE DATE AND TIME: NORMAL
KETONES UR STRIP-MCNC: NEGATIVE MG/DL
LACTATE SERPL-SCNC: 1.9 MMOL/L (ref 0.7–2)
LEUKOCYTE ESTERASE UR QL STRIP: NEGATIVE
LYMPHOCYTES # BLD MANUAL: 0.5 10E3/UL (ref 0.8–5.3)
LYMPHOCYTES # BLD MANUAL: 2.3 10E3/UL (ref 0.8–5.3)
LYMPHOCYTES NFR BLD MANUAL: 32 %
LYMPHOCYTES NFR BLD MANUAL: 5 %
MAGNESIUM SERPL-MCNC: 1.5 MG/DL (ref 1.7–2.3)
MCH RBC QN AUTO: 31.9 PG (ref 26.5–33)
MCH RBC QN AUTO: 32.1 PG (ref 26.5–33)
MCHC RBC AUTO-ENTMCNC: 32.5 G/DL (ref 31.5–36.5)
MCHC RBC AUTO-ENTMCNC: 33.7 G/DL (ref 31.5–36.5)
MCV RBC AUTO: 94 FL (ref 78–100)
MCV RBC AUTO: 95 FL (ref 78–100)
MCV RBC AUTO: 98 FL (ref 78–100)
METAMYELOCYTES # BLD MANUAL: 0.2 10E3/UL
METAMYELOCYTES NFR BLD MANUAL: 2 %
MONOCYTES # BLD MANUAL: 0.8 10E3/UL (ref 0–1.3)
MONOCYTES # BLD MANUAL: 1 10E3/UL (ref 0–1.3)
MONOCYTES NFR BLD MANUAL: 11 %
MONOCYTES NFR BLD MANUAL: 11 %
MYELOCYTES # BLD MANUAL: 0.2 10E3/UL
MYELOCYTES NFR BLD MANUAL: 2 %
NEUTROPHILS # BLD MANUAL: 3.9 10E3/UL (ref 1.6–8.3)
NEUTROPHILS # BLD MANUAL: 7.3 10E3/UL (ref 1.6–8.3)
NEUTROPHILS NFR BLD MANUAL: 54 %
NEUTROPHILS NFR BLD MANUAL: 79 %
NITRATE UR QL: NEGATIVE
O2/TOTAL GAS SETTING VFR VENT: 21 %
OXYHGB MFR BLDV: 81 % (ref 70–75)
PCO2 BLDV: 33 MM HG (ref 40–50)
PH BLDV: 7.52 [PH] (ref 7.32–7.43)
PH UR STRIP: 6.5 [PH] (ref 5–9)
PLAT MORPH BLD: NORMAL
PLAT MORPH BLD: NORMAL
PLATELET # BLD AUTO: 49 10E3/UL (ref 150–450)
PLATELET # BLD AUTO: 54 10E3/UL (ref 150–450)
PO2 BLDV: 44 MM HG (ref 25–47)
POTASSIUM SERPL-SCNC: 3 MMOL/L (ref 3.4–5.3)
POTASSIUM SERPL-SCNC: 3.1 MMOL/L (ref 3.4–5.3)
PROCALCITONIN SERPL IA-MCNC: 0.27 NG/ML
PROT SERPL-MCNC: 6.5 G/DL (ref 6.4–8.3)
PROT SERPL-MCNC: 6.9 G/DL (ref 6.4–8.3)
RBC # BLD AUTO: 1.87 10E6/UL (ref 3.8–5.2)
RBC # BLD AUTO: 2.07 10E6/UL (ref 3.8–5.2)
RBC MORPH BLD: NORMAL
RBC MORPH BLD: NORMAL
RBC URINE: 12 /HPF
RSV RNA SPEC NAA+PROBE: NEGATIVE
SAO2 % BLDV: 87.7 % (ref 70–75)
SARS-COV-2 RNA RESP QL NAA+PROBE: NEGATIVE
SODIUM SERPL-SCNC: 139 MMOL/L (ref 135–145)
SODIUM SERPL-SCNC: 142 MMOL/L (ref 135–145)
SP GR UR STRIP: 1.02 (ref 1–1.03)
UNIT ABO/RH: NORMAL
UNIT ABO/RH: NORMAL
UNIT NUMBER: NORMAL
UNIT NUMBER: NORMAL
UNIT STATUS: NORMAL
UNIT STATUS: NORMAL
UNIT TYPE ISBT: 5100
UNIT TYPE ISBT: 9500
UROBILINOGEN UR STRIP-MCNC: NORMAL MG/DL
WBC # BLD AUTO: 7.2 10E3/UL (ref 4–11)
WBC # BLD AUTO: 9.3 10E3/UL (ref 4–11)
WBC URINE: 6 /HPF

## 2025-06-25 PROCEDURE — 85007 BL SMEAR W/DIFF WBC COUNT: CPT | Performed by: EMERGENCY MEDICINE

## 2025-06-25 PROCEDURE — 82310 ASSAY OF CALCIUM: CPT | Mod: ZL

## 2025-06-25 PROCEDURE — 85018 HEMOGLOBIN: CPT | Performed by: EMERGENCY MEDICINE

## 2025-06-25 PROCEDURE — 36415 COLL VENOUS BLD VENIPUNCTURE: CPT | Performed by: EMERGENCY MEDICINE

## 2025-06-25 PROCEDURE — 84145 PROCALCITONIN (PCT): CPT | Performed by: EMERGENCY MEDICINE

## 2025-06-25 PROCEDURE — 999N000157 HC STATISTIC RCP TIME EA 10 MIN

## 2025-06-25 PROCEDURE — 96375 TX/PRO/DX INJ NEW DRUG ADDON: CPT | Performed by: EMERGENCY MEDICINE

## 2025-06-25 PROCEDURE — 86923 COMPATIBILITY TEST ELECTRIC: CPT | Performed by: EMERGENCY MEDICINE

## 2025-06-25 PROCEDURE — 99285 EMERGENCY DEPT VISIT HI MDM: CPT | Performed by: EMERGENCY MEDICINE

## 2025-06-25 PROCEDURE — 120N000001 HC R&B MED SURG/OB

## 2025-06-25 PROCEDURE — 83735 ASSAY OF MAGNESIUM: CPT | Performed by: EMERGENCY MEDICINE

## 2025-06-25 PROCEDURE — 96365 THER/PROPH/DIAG IV INF INIT: CPT | Performed by: EMERGENCY MEDICINE

## 2025-06-25 PROCEDURE — 250N000009 HC RX 250: Performed by: FAMILY MEDICINE

## 2025-06-25 PROCEDURE — P9040 RBC LEUKOREDUCED IRRADIATED: HCPCS | Performed by: INTERNAL MEDICINE

## 2025-06-25 PROCEDURE — 82805 BLOOD GASES W/O2 SATURATION: CPT | Performed by: EMERGENCY MEDICINE

## 2025-06-25 PROCEDURE — 85007 BL SMEAR W/DIFF WBC COUNT: CPT | Mod: ZL

## 2025-06-25 PROCEDURE — 99291 CRITICAL CARE FIRST HOUR: CPT | Mod: 25 | Performed by: EMERGENCY MEDICINE

## 2025-06-25 PROCEDURE — 250N000013 HC RX MED GY IP 250 OP 250 PS 637: Performed by: FAMILY MEDICINE

## 2025-06-25 PROCEDURE — 81001 URINALYSIS AUTO W/SCOPE: CPT | Performed by: EMERGENCY MEDICINE

## 2025-06-25 PROCEDURE — 85018 HEMOGLOBIN: CPT | Performed by: FAMILY MEDICINE

## 2025-06-25 PROCEDURE — 94640 AIRWAY INHALATION TREATMENT: CPT

## 2025-06-25 PROCEDURE — 99223 1ST HOSP IP/OBS HIGH 75: CPT | Performed by: FAMILY MEDICINE

## 2025-06-25 PROCEDURE — 36415 COLL VENOUS BLD VENIPUNCTURE: CPT | Mod: ZL

## 2025-06-25 PROCEDURE — 70450 CT HEAD/BRAIN W/O DYE: CPT

## 2025-06-25 PROCEDURE — 96368 THER/DIAG CONCURRENT INF: CPT | Performed by: EMERGENCY MEDICINE

## 2025-06-25 PROCEDURE — 83605 ASSAY OF LACTIC ACID: CPT | Performed by: EMERGENCY MEDICINE

## 2025-06-25 PROCEDURE — 71045 X-RAY EXAM CHEST 1 VIEW: CPT | Mod: 26 | Performed by: RADIOLOGY

## 2025-06-25 PROCEDURE — 96374 THER/PROPH/DIAG INJ IV PUSH: CPT | Mod: XS | Performed by: EMERGENCY MEDICINE

## 2025-06-25 PROCEDURE — 87637 SARSCOV2&INF A&B&RSV AMP PRB: CPT | Performed by: FAMILY MEDICINE

## 2025-06-25 PROCEDURE — 99292 CRITICAL CARE ADDL 30 MIN: CPT | Performed by: EMERGENCY MEDICINE

## 2025-06-25 PROCEDURE — 87040 BLOOD CULTURE FOR BACTERIA: CPT | Performed by: EMERGENCY MEDICINE

## 2025-06-25 PROCEDURE — 86923 COMPATIBILITY TEST ELECTRIC: CPT | Performed by: INTERNAL MEDICINE

## 2025-06-25 PROCEDURE — 250N000011 HC RX IP 250 OP 636: Performed by: EMERGENCY MEDICINE

## 2025-06-25 PROCEDURE — 71045 X-RAY EXAM CHEST 1 VIEW: CPT

## 2025-06-25 PROCEDURE — 70450 CT HEAD/BRAIN W/O DYE: CPT | Mod: 26 | Performed by: RADIOLOGY

## 2025-06-25 PROCEDURE — 84155 ASSAY OF PROTEIN SERUM: CPT | Performed by: EMERGENCY MEDICINE

## 2025-06-25 PROCEDURE — 36430 TRANSFUSION BLD/BLD COMPNT: CPT | Performed by: EMERGENCY MEDICINE

## 2025-06-25 PROCEDURE — P9040 RBC LEUKOREDUCED IRRADIATED: HCPCS | Performed by: EMERGENCY MEDICINE

## 2025-06-25 PROCEDURE — G0463 HOSPITAL OUTPT CLINIC VISIT: HCPCS

## 2025-06-25 PROCEDURE — 36415 COLL VENOUS BLD VENIPUNCTURE: CPT | Performed by: FAMILY MEDICINE

## 2025-06-25 PROCEDURE — 85014 HEMATOCRIT: CPT | Mod: ZL

## 2025-06-25 PROCEDURE — 258N000003 HC RX IP 258 OP 636: Performed by: EMERGENCY MEDICINE

## 2025-06-25 PROCEDURE — 250N000013 HC RX MED GY IP 250 OP 250 PS 637: Performed by: EMERGENCY MEDICINE

## 2025-06-25 RX ORDER — CYCLOBENZAPRINE HCL 10 MG
10 TABLET ORAL 3 TIMES DAILY PRN
Status: DISCONTINUED | OUTPATIENT
Start: 2025-06-25 | End: 2025-06-26 | Stop reason: HOSPADM

## 2025-06-25 RX ORDER — CEFEPIME HYDROCHLORIDE 2 G/1
2 INJECTION, POWDER, FOR SOLUTION INTRAVENOUS ONCE
Status: COMPLETED | OUTPATIENT
Start: 2025-06-25 | End: 2025-06-25

## 2025-06-25 RX ORDER — SUCRALFATE ORAL 1 G/10ML
1 SUSPENSION ORAL
Status: DISCONTINUED | OUTPATIENT
Start: 2025-06-25 | End: 2025-06-26 | Stop reason: HOSPADM

## 2025-06-25 RX ORDER — POSACONAZOLE 100 MG/1
300 TABLET, DELAYED RELEASE ORAL EVERY MORNING
Qty: 90 TABLET | Refills: 0 | Status: SHIPPED | OUTPATIENT
Start: 2025-06-25

## 2025-06-25 RX ORDER — DIPHENHYDRAMINE HYDROCHLORIDE 50 MG/ML
50 INJECTION, SOLUTION INTRAMUSCULAR; INTRAVENOUS
Status: CANCELLED
Start: 2025-06-25

## 2025-06-25 RX ORDER — IPRATROPIUM BROMIDE AND ALBUTEROL SULFATE 2.5; .5 MG/3ML; MG/3ML
3 SOLUTION RESPIRATORY (INHALATION)
Status: DISCONTINUED | OUTPATIENT
Start: 2025-06-25 | End: 2025-06-26 | Stop reason: HOSPADM

## 2025-06-25 RX ORDER — HEPARIN SODIUM (PORCINE) LOCK FLUSH IV SOLN 100 UNIT/ML 100 UNIT/ML
5 SOLUTION INTRAVENOUS
Status: CANCELLED | OUTPATIENT
Start: 2025-06-25

## 2025-06-25 RX ORDER — POTASSIUM CHLORIDE 1500 MG/1
40 TABLET, EXTENDED RELEASE ORAL ONCE
Status: COMPLETED | OUTPATIENT
Start: 2025-06-25 | End: 2025-06-25

## 2025-06-25 RX ORDER — PAROXETINE 20 MG/1
20 TABLET, FILM COATED ORAL DAILY
Status: DISCONTINUED | OUTPATIENT
Start: 2025-06-26 | End: 2025-06-26 | Stop reason: HOSPADM

## 2025-06-25 RX ORDER — ONDANSETRON 4 MG/1
4-8 TABLET, ORALLY DISINTEGRATING ORAL EVERY 8 HOURS PRN
Qty: 45 TABLET | Refills: 3 | Status: SHIPPED | OUTPATIENT
Start: 2025-06-25

## 2025-06-25 RX ORDER — AMOXICILLIN 250 MG
1 CAPSULE ORAL 2 TIMES DAILY PRN
Status: DISCONTINUED | OUTPATIENT
Start: 2025-06-25 | End: 2025-06-26 | Stop reason: HOSPADM

## 2025-06-25 RX ORDER — FLUTICASONE PROPIONATE 50 MCG
2 SPRAY, SUSPENSION (ML) NASAL 2 TIMES DAILY
Qty: 15.8 ML | Refills: 0 | Status: SHIPPED | OUTPATIENT
Start: 2025-06-25

## 2025-06-25 RX ORDER — VITAMIN B COMPLEX
25 TABLET ORAL DAILY
Qty: 90 TABLET | Refills: 3 | Status: SHIPPED | OUTPATIENT
Start: 2025-06-25

## 2025-06-25 RX ORDER — LISINOPRIL 10 MG/1
10 TABLET ORAL DAILY
Status: DISCONTINUED | OUTPATIENT
Start: 2025-06-26 | End: 2025-06-26 | Stop reason: HOSPADM

## 2025-06-25 RX ORDER — ACETAMINOPHEN 325 MG/1
650 TABLET ORAL EVERY 4 HOURS PRN
Status: DISCONTINUED | OUTPATIENT
Start: 2025-06-25 | End: 2025-06-26 | Stop reason: HOSPADM

## 2025-06-25 RX ORDER — ACETAMINOPHEN 10 MG/ML
1000 INJECTION, SOLUTION INTRAVENOUS ONCE
Status: COMPLETED | OUTPATIENT
Start: 2025-06-25 | End: 2025-06-25

## 2025-06-25 RX ORDER — LIDOCAINE 40 MG/G
CREAM TOPICAL
Status: DISCONTINUED | OUTPATIENT
Start: 2025-06-25 | End: 2025-06-26 | Stop reason: HOSPADM

## 2025-06-25 RX ORDER — ONDANSETRON 2 MG/ML
4 INJECTION INTRAMUSCULAR; INTRAVENOUS EVERY 6 HOURS PRN
Status: DISCONTINUED | OUTPATIENT
Start: 2025-06-25 | End: 2025-06-26 | Stop reason: HOSPADM

## 2025-06-25 RX ORDER — FAMOTIDINE 20 MG/1
20 TABLET, FILM COATED ORAL 2 TIMES DAILY
Status: DISCONTINUED | OUTPATIENT
Start: 2025-06-25 | End: 2025-06-26 | Stop reason: HOSPADM

## 2025-06-25 RX ORDER — CEFPODOXIME PROXETIL 200 MG/1
200 TABLET, FILM COATED ORAL 2 TIMES DAILY
Qty: 120 TABLET | Refills: 0 | Status: ON HOLD | OUTPATIENT
Start: 2025-06-25 | End: 2025-06-26

## 2025-06-25 RX ORDER — EPINEPHRINE 1 MG/ML
0.3 INJECTION, SOLUTION, CONCENTRATE INTRAVENOUS EVERY 5 MIN PRN
Status: CANCELLED | OUTPATIENT
Start: 2025-06-25

## 2025-06-25 RX ORDER — BUDESONIDE 0.5 MG/2ML
0.5 INHALANT ORAL 2 TIMES DAILY
Status: DISCONTINUED | OUTPATIENT
Start: 2025-06-25 | End: 2025-06-26 | Stop reason: HOSPADM

## 2025-06-25 RX ORDER — POTASSIUM CHLORIDE 1500 MG/1
20 TABLET, EXTENDED RELEASE ORAL DAILY
Qty: 90 TABLET | Refills: 3 | Status: SHIPPED | OUTPATIENT
Start: 2025-06-25

## 2025-06-25 RX ORDER — GABAPENTIN 400 MG/1
400 CAPSULE ORAL 3 TIMES DAILY
Status: DISCONTINUED | OUTPATIENT
Start: 2025-06-25 | End: 2025-06-26 | Stop reason: HOSPADM

## 2025-06-25 RX ORDER — AMOXICILLIN 250 MG
2 CAPSULE ORAL 2 TIMES DAILY PRN
Status: DISCONTINUED | OUTPATIENT
Start: 2025-06-25 | End: 2025-06-26 | Stop reason: HOSPADM

## 2025-06-25 RX ORDER — FAMOTIDINE 20 MG/1
20 TABLET, FILM COATED ORAL 2 TIMES DAILY
Qty: 60 TABLET | Refills: 0 | Status: SHIPPED | OUTPATIENT
Start: 2025-06-25

## 2025-06-25 RX ORDER — CALCIUM CARBONATE 500 MG/1
1000 TABLET, CHEWABLE ORAL 4 TIMES DAILY PRN
Status: DISCONTINUED | OUTPATIENT
Start: 2025-06-25 | End: 2025-06-26 | Stop reason: HOSPADM

## 2025-06-25 RX ORDER — MAGNESIUM SULFATE HEPTAHYDRATE 40 MG/ML
4 INJECTION, SOLUTION INTRAVENOUS ONCE
Status: COMPLETED | OUTPATIENT
Start: 2025-06-25 | End: 2025-06-25

## 2025-06-25 RX ORDER — PROCHLORPERAZINE MALEATE 5 MG/1
5 TABLET ORAL EVERY 6 HOURS PRN
Qty: 30 TABLET | Refills: 3 | Status: SHIPPED | OUTPATIENT
Start: 2025-06-25

## 2025-06-25 RX ORDER — CYCLOBENZAPRINE HCL 10 MG
10 TABLET ORAL 3 TIMES DAILY PRN
Qty: 15 TABLET | Refills: 0 | Status: SHIPPED | OUTPATIENT
Start: 2025-06-25

## 2025-06-25 RX ORDER — POTASSIUM CHLORIDE 750 MG/1
20 TABLET, EXTENDED RELEASE ORAL DAILY
Status: DISCONTINUED | OUTPATIENT
Start: 2025-06-26 | End: 2025-06-26 | Stop reason: HOSPADM

## 2025-06-25 RX ORDER — PANTOPRAZOLE SODIUM 40 MG/1
40 TABLET, DELAYED RELEASE ORAL 2 TIMES DAILY
Status: DISCONTINUED | OUTPATIENT
Start: 2025-06-25 | End: 2025-06-26 | Stop reason: HOSPADM

## 2025-06-25 RX ORDER — GABAPENTIN 400 MG/1
400 CAPSULE ORAL 3 TIMES DAILY
Qty: 90 CAPSULE | Refills: 3 | Status: SHIPPED | OUTPATIENT
Start: 2025-06-25

## 2025-06-25 RX ORDER — HEPARIN SODIUM,PORCINE 10 UNIT/ML
5-20 VIAL (ML) INTRAVENOUS DAILY PRN
Status: CANCELLED | OUTPATIENT
Start: 2025-06-25

## 2025-06-25 RX ORDER — ACYCLOVIR 200 MG/1
400 CAPSULE ORAL 2 TIMES DAILY
Status: DISCONTINUED | OUTPATIENT
Start: 2025-06-25 | End: 2025-06-26 | Stop reason: HOSPADM

## 2025-06-25 RX ORDER — HYDROXYZINE HYDROCHLORIDE 25 MG/1
25 TABLET, FILM COATED ORAL EVERY 6 HOURS PRN
Status: DISCONTINUED | OUTPATIENT
Start: 2025-06-25 | End: 2025-06-26 | Stop reason: HOSPADM

## 2025-06-25 RX ORDER — ONDANSETRON 4 MG/1
4 TABLET, ORALLY DISINTEGRATING ORAL EVERY 6 HOURS PRN
Status: DISCONTINUED | OUTPATIENT
Start: 2025-06-25 | End: 2025-06-26 | Stop reason: HOSPADM

## 2025-06-25 RX ORDER — POTASSIUM CHLORIDE 1500 MG/1
20 TABLET, EXTENDED RELEASE ORAL ONCE
Status: DISCONTINUED | OUTPATIENT
Start: 2025-06-25 | End: 2025-06-25

## 2025-06-25 RX ORDER — LORATADINE 10 MG/1
10 TABLET ORAL DAILY
Qty: 90 TABLET | Refills: 3 | Status: SHIPPED | OUTPATIENT
Start: 2025-06-25

## 2025-06-25 RX ORDER — LISINOPRIL 10 MG/1
10 TABLET ORAL DAILY
Qty: 90 TABLET | Refills: 3 | Status: SHIPPED | OUTPATIENT
Start: 2025-06-25

## 2025-06-25 RX ORDER — ACETAMINOPHEN 650 MG/1
650 SUPPOSITORY RECTAL EVERY 4 HOURS PRN
Status: DISCONTINUED | OUTPATIENT
Start: 2025-06-25 | End: 2025-06-26 | Stop reason: HOSPADM

## 2025-06-25 RX ORDER — PANTOPRAZOLE SODIUM 40 MG/1
40 TABLET, DELAYED RELEASE ORAL
Qty: 180 TABLET | Refills: 3 | Status: SHIPPED | OUTPATIENT
Start: 2025-06-25

## 2025-06-25 RX ORDER — ROSUVASTATIN CALCIUM 20 MG/1
20 TABLET, COATED ORAL DAILY
Qty: 90 TABLET | Refills: 3 | Status: SHIPPED | OUTPATIENT
Start: 2025-06-25

## 2025-06-25 RX ORDER — ACYCLOVIR 400 MG/1
400 TABLET ORAL 2 TIMES DAILY
Qty: 120 TABLET | Refills: 0 | Status: SHIPPED | OUTPATIENT
Start: 2025-06-25

## 2025-06-25 RX ORDER — ALBUTEROL SULFATE 90 UG/1
1-2 INHALANT RESPIRATORY (INHALATION) 4 TIMES DAILY PRN
Qty: 18 G | Refills: 0 | Status: SHIPPED | OUTPATIENT
Start: 2025-06-25

## 2025-06-25 RX ORDER — PAROXETINE 20 MG/1
20 TABLET, FILM COATED ORAL EVERY MORNING
Qty: 90 TABLET | Refills: 3 | Status: SHIPPED | OUTPATIENT
Start: 2025-06-25

## 2025-06-25 RX ADMIN — CEFEPIME 2 G: 2 INJECTION, POWDER, FOR SOLUTION INTRAVENOUS at 16:03

## 2025-06-25 RX ADMIN — SUCRALFATE ORAL SUSPENSION 1 G: 1 SUSPENSION ORAL at 21:29

## 2025-06-25 RX ADMIN — PANTOPRAZOLE SODIUM 40 MG: 40 TABLET, DELAYED RELEASE ORAL at 21:29

## 2025-06-25 RX ADMIN — SODIUM CHLORIDE 1000 ML: 0.9 INJECTION, SOLUTION INTRAVENOUS at 16:04

## 2025-06-25 RX ADMIN — POTASSIUM CHLORIDE 40 MEQ: 1500 TABLET, EXTENDED RELEASE ORAL at 17:55

## 2025-06-25 RX ADMIN — IPRATROPIUM BROMIDE AND ALBUTEROL SULFATE 3 ML: .5; 3 SOLUTION RESPIRATORY (INHALATION) at 20:55

## 2025-06-25 RX ADMIN — FAMOTIDINE 20 MG: 20 TABLET, FILM COATED ORAL at 21:29

## 2025-06-25 RX ADMIN — BUDESONIDE 0.5 MG: 0.5 INHALANT RESPIRATORY (INHALATION) at 20:55

## 2025-06-25 RX ADMIN — Medication 2000 MG: at 16:40

## 2025-06-25 RX ADMIN — MAGNESIUM SULFATE HEPTAHYDRATE 4 G: 4 INJECTION, SOLUTION INTRAVENOUS at 17:56

## 2025-06-25 RX ADMIN — GABAPENTIN 400 MG: 400 CAPSULE ORAL at 21:28

## 2025-06-25 RX ADMIN — ACETAMINOPHEN 650 MG: 325 TABLET ORAL at 21:28

## 2025-06-25 RX ADMIN — ACETAMINOPHEN 1000 MG: 10 INJECTION, SOLUTION INTRAVENOUS at 16:03

## 2025-06-25 RX ADMIN — ACYCLOVIR 400 MG: 200 CAPSULE ORAL at 21:28

## 2025-06-25 ASSESSMENT — ENCOUNTER SYMPTOMS
NEUROLOGICAL NEGATIVE: 1
RESPIRATORY NEGATIVE: 1
EYES NEGATIVE: 1
BACK PAIN: 1
CONSTIPATION: 1
PSYCHIATRIC NEGATIVE: 1
BRUISES/BLEEDS EASILY: 1
FATIGUE: 1

## 2025-06-25 ASSESSMENT — ACTIVITIES OF DAILY LIVING (ADL)
ADLS_ACUITY_SCORE: 58
ADLS_ACUITY_SCORE: 37
ADLS_ACUITY_SCORE: 40
ADLS_ACUITY_SCORE: 40
ADLS_ACUITY_SCORE: 58
ADLS_ACUITY_SCORE: 40
ADLS_ACUITY_SCORE: 58
ADLS_ACUITY_SCORE: 58
ADLS_ACUITY_SCORE: 39

## 2025-06-25 ASSESSMENT — ANXIETY QUESTIONNAIRES
6. BECOMING EASILY ANNOYED OR IRRITABLE: NOT AT ALL
8. IF YOU CHECKED OFF ANY PROBLEMS, HOW DIFFICULT HAVE THESE MADE IT FOR YOU TO DO YOUR WORK, TAKE CARE OF THINGS AT HOME, OR GET ALONG WITH OTHER PEOPLE?: NOT DIFFICULT AT ALL
7. FEELING AFRAID AS IF SOMETHING AWFUL MIGHT HAPPEN: NOT AT ALL
5. BEING SO RESTLESS THAT IT IS HARD TO SIT STILL: NOT AT ALL
4. TROUBLE RELAXING: NOT AT ALL
IF YOU CHECKED OFF ANY PROBLEMS ON THIS QUESTIONNAIRE, HOW DIFFICULT HAVE THESE PROBLEMS MADE IT FOR YOU TO DO YOUR WORK, TAKE CARE OF THINGS AT HOME, OR GET ALONG WITH OTHER PEOPLE: NOT DIFFICULT AT ALL
2. NOT BEING ABLE TO STOP OR CONTROL WORRYING: NOT AT ALL
7. FEELING AFRAID AS IF SOMETHING AWFUL MIGHT HAPPEN: NOT AT ALL
3. WORRYING TOO MUCH ABOUT DIFFERENT THINGS: NOT AT ALL
1. FEELING NERVOUS, ANXIOUS, OR ON EDGE: NOT AT ALL
GAD7 TOTAL SCORE: 0

## 2025-06-25 ASSESSMENT — PAIN SCALES - GENERAL: PAINLEVEL_OUTOF10: SEVERE PAIN (8)

## 2025-06-25 NOTE — ED TRIAGE NOTES
Pt arrives to ER from infusion center for concern for high fever, possible rigors. Pt arrives very weak, not speaking normally, with sister at bedside providing history. Pt is alert and answering questions as able, but per sister is not at her baseline. Has platelets 2 days ago, and was presenting today to get a transfusion. Febrile 102F in clinic, 102.7F Tympanic in ER.     BP (!) 128/96   Pulse 117   Temp (!) 102.5  F (39.2  C) (Tympanic)   Resp 17   Wt 79.8 kg (176 lb)   LMP 01/01/2007 (Approximate)   BMI 30.21 kg/m         Triage Assessment (Adult)       Row Name 06/25/25 7766          Triage Assessment    Airway WDL WDL        Respiratory WDL    Respiratory WDL WDL        Skin Circulation/Temperature WDL    Skin Circulation/Temperature WDL all     Skin Temperature warm

## 2025-06-25 NOTE — PROGRESS NOTES
Preventing Falls in the Hospital (02:42)  Your health professional recommends that you watch this short online health video.  Find out why you're at risk for falling in the hospital and how to prevent falls.   Purpose: Understand what increases a person's risk of falling in the hospital and how to prevent falls.  Goal: Understand what increases a person's risk of falling in the hospital and how to prevent falls.    Watch: Scan the QR code or visit the link to view video       https://hwi.se/[email or web address]  Current as of: July 17, 2023  Content Version: 14.2 2024 [facility] [employer].   Care instructions adapted under license by your healthcare professional. If you have questions about a medical condition or this instruction, always ask your healthcare professional. [employer] disclaims any warranty or liability for your use of this information.  Preventing Falls in the Hospital

## 2025-06-25 NOTE — NURSING NOTE
"Chief Complaint   Patient presents with    Hypertension     Follow up      Patient presents for follow up on hypertension.  Currently rates pain 8/10 in legs at this time.      Initial /80 (BP Location: Right arm, Patient Position: Sitting, Cuff Size: Adult Large)   Pulse 80   Temp 97.6  F (36.4  C) (Temporal)   Resp 16   Wt 80.1 kg (176 lb 9.6 oz)   LMP 01/01/2007 (Approximate)   SpO2 96%   BMI 30.31 kg/m   Estimated body mass index is 30.31 kg/m  as calculated from the following:    Height as of 6/19/25: 1.626 m (5' 4\").    Weight as of this encounter: 80.1 kg (176 lb 9.6 oz).  Medication Review: complete    The next two questions are to help us understand your food security.  If you are feeling you need any assistance in this area, we have resources available to support you today.          6/11/2025   SDOH- Food Insecurity   Within the past 12 months, did you worry that your food would run out before you got money to buy more? N   Within the past 12 months, did the food you bought just not last and you didn t have money to get more? N         Health Care Directive:  Patient does not have a Health Care Directive: Patient states has Advance Directive and will bring in a copy to clinic.    Trina Meadows LPN on 6/25/2025 at 10:09 AM        "

## 2025-06-25 NOTE — PROGRESS NOTES
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) - Primary    Relevant Medications    albuterol (PROAIR HFA/PROVENTIL HFA/VENTOLIN HFA) 108 (90 Base) MCG/ACT inhaler    acyclovir (ZOVIRAX) 400 MG tablet    cefpodoxime (VANTIN) 200 MG tablet    loratadine (CLARITIN) 10 MG tablet    prochlorperazine (COMPAZINE) 5 MG tablet    Hypokalemia    Relevant Medications    potassium chloride ER (K-TAB) 20 MEQ CR tablet    Acute myeloid leukemia (H)    Relevant Medications    gabapentin (NEURONTIN) 400 MG capsule    lisinopril (ZESTRIL) 10 MG tablet    pantoprazole (PROTONIX) 40 MG EC tablet    posaconazole (NOXAFIL) 100 MG DR tablet     Other Visit Diagnoses         Dysfunction of both eustachian tubes        Relevant Medications    albuterol (PROAIR HFA/PROVENTIL HFA/VENTOLIN HFA) 108 (90 Base) MCG/ACT inhaler    fluticasone (FLONASE) 50 MCG/ACT nasal spray    loratadine (CLARITIN) 10 MG tablet      Rhinitis, unspecified type        Relevant Medications    albuterol (PROAIR HFA/PROVENTIL HFA/VENTOLIN HFA) 108 (90 Base) MCG/ACT inhaler    fluticasone (FLONASE) 50 MCG/ACT nasal spray    loratadine (CLARITIN) 10 MG tablet      Chronic anxiety        Relevant Medications    gabapentin (NEURONTIN) 400 MG capsule    PARoxetine (PAXIL) 20 MG tablet      Nausea and vomiting, unspecified vomiting type        Relevant Medications    ondansetron (ZOFRAN ODT) 4 MG ODT tab      Vitamin D deficiency        Relevant Medications    Vitamin D3 (CHOLECALCIFEROL) 25 mcg (1000 units) tablet      Muscle spasm        Relevant Medications    cyclobenzaprine (FLEXERIL) 10 MG tablet      Thrombocytopenia                   Diagnoses and all orders for this visit:  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  -     acyclovir (ZOVIRAX) 400 MG tablet; Take 1 tablet (400 mg) by mouth 2 times daily.  -     cefpodoxime (VANTIN) 200 MG tablet; Take 1 tablet (200 mg) by mouth 2 times daily.  -     loratadine (CLARITIN) 10 MG tablet; Take 1 tablet (10 mg) by mouth daily.  -     prochlorperazine (COMPAZINE) 5 MG tablet; Take 1 tablet (5 mg) by mouth every 6 hours as needed for nausea or vomiting.  Gastroesophageal reflux disease, unspecified whether esophagitis present  -     famotidine (PEPCID) 20 MG tablet; Take 1 tablet (20 mg) by mouth 2 times daily.  Dysfunction of both eustachian tubes  -     fluticasone (FLONASE) 50 MCG/ACT nasal spray; Spray 2 sprays into both nostrils 2 times daily.  Rhinitis, unspecified type  -     fluticasone (FLONASE) 50 MCG/ACT nasal spray; Spray 2 sprays into both nostrils 2 times daily.  Acute myeloid leukemia in remission (H)  -     gabapentin (NEURONTIN) 400 MG capsule; Take 1 capsule (400 mg) by mouth 3 times daily.  -     lisinopril (ZESTRIL) 10 MG tablet; Take 1 tablet (10 mg) by mouth daily.  -     pantoprazole (PROTONIX) 40 MG EC tablet; Take 1 tablet (40 mg) by mouth 2 times daily (before meals).  -     posaconazole (NOXAFIL) 100 MG DR tablet; Take 3 tablets (300 mg) by mouth every morning.  Chronic anxiety  -     PARoxetine (PAXIL) 20 MG tablet; Take 1 tablet (20 mg) by mouth every morning.  Mixed hyperlipidemia  -     rosuvastatin (CRESTOR) 20 MG tablet; Take 1 tablet (20 mg) by mouth daily.  Nausea and vomiting, unspecified vomiting type  -     ondansetron (ZOFRAN ODT) 4 MG ODT tab; Take 1-2 tablets (4-8 mg) by mouth every 8 hours as needed for nausea or  vomiting.  Vitamin D deficiency  -     Vitamin D3 (CHOLECALCIFEROL) 25 mcg (1000 units) tablet; Take 1 tablet (25 mcg) by mouth daily.  Hypokalemia  -     potassium chloride ER (K-TAB) 20 MEQ CR tablet; Take 1 tablet (20 mEq) by mouth daily.  Muscle spasm  -     cyclobenzaprine (FLEXERIL) 10 MG tablet; Take 1 tablet (10 mg) by mouth 3 times daily as needed for muscle spasms.  Thrombocytopenia      I will refill all the patient's medications for 3 months of the time in the local pharmacy here.  I will assume care as her primary care doctor.  Her blood pressure is well-controlled at this time on lisinopril recheck as needed.        Subjective   Alejandra Cabrera A 59 year old female    Presents today for recheck of her blood pressure and establish care.  She recently was found to have a platelet count of 2.  She was transfused 2 units of platelets.  She did have a small hematoma at the infusion site.  Other than this she [place] 1 [place] and then they will consider doing a bone marrow transplant.  She is here to establish care.  She would like to have all her medications transferred here so she can have in 3 months at a time.  She does state that she has an lumbar disc that gives her problems sometimes.  She does take Tylenol as needed for the discomfort which works well.    History of Present Illness       Reason for visit:  I need a primary dr   She is taking medications regularly.    Past Medical History:   Diagnosis Date    Allergic rhinitis 09/27/2011         Disorder of kidney and ureter 08/08/2008    Kidney disease GFR 87    Dorsalgia     No Comments Provided    Generalized anxiety disorder     No Comments Provided    Hidradenitis suppurativa     No Comments Provided    Other disorders of lung (CODE) 08/01/2007    Pulmonary nodules, stable on follow-up chest CT 12/08.  No further imaging recommended.    Other specified disorders of Eustachian tube, unspecified ear 02/27/2012          Personal history of other medical treatment (CODE)     Childbirth x4    Rheumatoid arthritis (H) 02/27/2012         Tobacco use     No Comments Provided      reports that she has been smoking cigarettes. She started smoking about 44 years ago. She has a 44.5 pack-year smoking history. She has been exposed to tobacco smoke. She has never used smokeless tobacco. She reports that she does not drink alcohol and does not use drugs.  Family History   Problem Relation Age of Onset    Arthritis Mother         Arthritis,Rheumatoid    Cancer Mother         Cancer, lung and uterine cancer    Heart Disease Father         Heart Disease,CAD, CABG, peripheral vascular dise    Thyroid Disease Father         Thyroid Disease, hyperlipidemia    Other - See Comments Father         djd    Asthma Brother         Asthma    Asthma Sister         Asthma    Other - See Comments Sister         Psychiatric illness,Anxiety    Other - See Comments Son         x2 back surgeries    Breast Cancer No family hx of         Cancer-breast     Allergies   Allergen Reactions    Adhesive Tape     Bee Pollen Swelling     Seasonal    Bee Venom Unknown    Folic Acid Itching    Pollen Extract      Seasonal    Amoxicillin Rash    Chlorhexidine Rash     After several weeks, started to develop rash on R neck down to chest and arm. Also noted on back of knees. Providers suggesting related to CHG as nothing else is new for pt.     Liquid Adhesive Rash    Meloxicam Rash    Nabumetone GI Disturbance     GI upset       Current Outpatient Medications:     acetaminophen (TYLENOL) 325 MG tablet, Take 2 tablets (650 mg) by mouth every 4 hours as needed for mild pain., Disp: 90 tablet, Rfl: 0    acyclovir (ZOVIRAX) 400 MG tablet, Take 1 tablet (400 mg) by mouth 2 times daily., Disp: 120 tablet, Rfl: 0    albuterol (PROAIR HFA/PROVENTIL HFA/VENTOLIN HFA) 108 (90 Base) MCG/ACT inhaler, Inhale 1-2 puffs into the lungs 4 times daily as needed (Refractory bronchospasm  associated with hypersensitivity reaction)., Disp: 18 g, Rfl: 0    albuterol (PROVENTIL) (2.5 MG/3ML) 0.083% neb solution, Take 1 vial (2.5 mg) by nebulization 4 times daily as needed (Refractory bronchospasm associated with hypersensitivity reaction)., Disp: 90 mL, Rfl: 0    budesonide-formoterol (SYMBICORT) 80-4.5 MCG/ACT Inhaler, Inhale 2 puffs into the lungs 2 times daily - Rinse mouth well after use to prevent Thrush, Disp: 10.2 g, Rfl: 11    calcium carbonate (TUMS) 500 MG chewable tablet, Take 1 tablet (500 mg) by mouth 3 times daily as needed for heartburn., Disp: 90 tablet, Rfl: 0    cefpodoxime (VANTIN) 200 MG tablet, Take 1 tablet (200 mg) by mouth 2 times daily., Disp: 120 tablet, Rfl: 0    cyanocobalamin (VITAMIN B-12) 500 MCG tablet, Take 500 mcg by mouth daily., Disp: , Rfl:     cyclobenzaprine (FLEXERIL) 10 MG tablet, Take 1 tablet (10 mg) by mouth 3 times daily as needed for muscle spasms., Disp: 15 tablet, Rfl: 0    famotidine (PEPCID) 20 MG tablet, Take 1 tablet (20 mg) by mouth 2 times daily., Disp: 60 tablet, Rfl: 0    fluticasone (FLONASE) 50 MCG/ACT nasal spray, Spray 2 sprays into both nostrils 2 times daily., Disp: 15.8 mL, Rfl: 0    gabapentin (NEURONTIN) 400 MG capsule, Take 1 capsule (400 mg) by mouth 3 times daily., Disp: 90 capsule, Rfl: 3    hydrOXYzine HCl (ATARAX) 25 MG tablet, Take 1-2 tablets (25-50 mg) by mouth every 6 hours as needed for anxiety or itching (adjuvant pain, sleep)., Disp: 90 tablet, Rfl: 3    lisinopril (ZESTRIL) 10 MG tablet, Take 1 tablet (10 mg) by mouth daily., Disp: 90 tablet, Rfl: 3    loratadine (CLARITIN) 10 MG tablet, Take 1 tablet (10 mg) by mouth daily., Disp: 90 tablet, Rfl: 3    ondansetron (ZOFRAN ODT) 4 MG ODT tab, Take 1-2 tablets (4-8 mg) by mouth every 8 hours as needed for nausea or vomiting., Disp: 45 tablet, Rfl: 3    order for DME, Equipment being ordered: home neb set up with mask and tubing, Disp: 1 Device, Rfl: 0    pantoprazole (PROTONIX)  40 MG EC tablet, Take 1 tablet (40 mg) by mouth 2 times daily (before meals)., Disp: 180 tablet, Rfl: 3    PARoxetine (PAXIL) 20 MG tablet, Take 1 tablet (20 mg) by mouth every morning., Disp: 90 tablet, Rfl: 3    posaconazole (NOXAFIL) 100 MG DR tablet, Take 3 tablets (300 mg) by mouth every morning., Disp: 90 tablet, Rfl: 0    potassium chloride ER (K-TAB) 20 MEQ CR tablet, Take 1 tablet (20 mEq) by mouth daily., Disp: 90 tablet, Rfl: 3    prednisoLONE acetate (PRED FORTE) 1 % ophthalmic suspension, Place 2 drops into both eyes 4 times daily. Last dose 6/16 AM., Disp: 5 mL, Rfl: 0    prochlorperazine (COMPAZINE) 5 MG tablet, Take 1 tablet (5 mg) by mouth every 6 hours as needed for nausea or vomiting., Disp: 30 tablet, Rfl: 3    rosuvastatin (CRESTOR) 20 MG tablet, Take 1 tablet (20 mg) by mouth daily., Disp: 90 tablet, Rfl: 3    sucralfate (CARAFATE) 1 GM/10ML suspension, Take 10 mLs (1 g) by mouth 4 times daily (before meals and nightly)., Disp: , Rfl:     SUMAtriptan (IMITREX) 50 MG tablet, Take 1 tablet (50 mg) by mouth at onset of headache for migraine., Disp: 30 tablet, Rfl: 0    Vitamin D3 (CHOLECALCIFEROL) 25 mcg (1000 units) tablet, Take 1 tablet (25 mcg) by mouth daily., Disp: 90 tablet, Rfl: 3  Review of Systems   Constitutional:  Positive for fatigue.   HENT:  Positive for ear pain (Ear pain left).    Eyes: Negative.    Respiratory: Negative.     Cardiovascular:  Positive for leg swelling.   Gastrointestinal:  Positive for constipation.   Genitourinary: Negative.    Musculoskeletal:  Positive for back pain.   Neurological: Negative.    Hematological:  Bruises/bleeds easily.   Psychiatric/Behavioral: Negative.           Objective      /80 (BP Location: Right arm, Patient Position: Sitting, Cuff Size: Adult Large)   Pulse 80   Temp 97.6  F (36.4  C) (Temporal)   Resp 16   Wt 80.1 kg (176 lb 9.6 oz)   LMP 01/01/2007 (Approximate)   SpO2 96%   BMI 30.31 kg/m    Body mass index is 30.31  kg/m .  Physical Exam  Constitutional:       Appearance: Normal appearance. She is normal weight.   HENT:      Head: Normocephalic.      Right Ear: Tympanic membrane and ear canal normal.      Left Ear: Tympanic membrane normal.      Ears:      Comments: Left tympanic membrane does not appear injected.  There is a hole that appears chronic in the ear     Nose: Congestion present.      Mouth/Throat:      Mouth: Mucous membranes are moist.      Pharynx: Oropharynx is clear. No oropharyngeal exudate or posterior oropharyngeal erythema.   Eyes:      Conjunctiva/sclera: Conjunctivae normal.   Cardiovascular:      Rate and Rhythm: Normal rate and regular rhythm.      Heart sounds: Murmur heard.   Abdominal:      General: Abdomen is flat.      Palpations: Abdomen is soft.   Musculoskeletal:         General: Normal range of motion.      Cervical back: Neck supple.   Skin:     General: Skin is warm and dry.      Capillary Refill: Capillary refill takes 2 to 3 seconds.      Comments: Approximately a 3 cm hematoma noted over the forearm on the left   Neurological:      General: No focal deficit present.      Mental Status: She is alert and oriented to person, place, and time.   Psychiatric:         Mood and Affect: Mood normal.         Behavior: Behavior normal.         Lab Results   Component Value Date    WBC 0.7 (LL) 06/23/2025    HGB 7.6 (L) 06/23/2025    PLT 2 (LL) 06/23/2025    CHOL 163 01/14/2025    TRIG 130 01/14/2025    HDL 45 (L) 01/14/2025    ALT 12 06/23/2025    AST 13 06/23/2025     06/23/2025    BUN 9.6 06/23/2025    CO2 23 06/23/2025       Admission on 06/23/2025, Discharged on 06/23/2025   Component Date Value Ref Range Status    Blood Component Type 06/23/2025 Platelets   Final    Product Code 06/23/2025 [id number]   Final    Unit Status 06/23/2025 Transfused   Final    Unit Number 06/23/2025 [id number]   Final    CODING SYSTEM 06/23/2025 [id number]   Final    ISSUE DATE AND TIME 06/23/2025 6/23/2025   "7:49:00 PM CDT   Final    UNIT ABO/RH 06/23/2025 O+   Final    UNIT TYPE ISBT 06/23/2025 5100   Final    Blood Component Type 06/23/2025 Platelets   Final    Product Code 06/23/2025 [id number]   Final    Unit Status 06/23/2025 Transfused   Final    Unit Number 06/23/2025 [id number]   Final    CODING SYSTEM 06/23/2025 [id number]   Final    ISSUE DATE AND TIME 06/23/2025 6/23/2025  8:29:00 PM CDT   Final    UNIT ABO/RH 06/23/2025 O+   Final    UNIT TYPE ISBT 06/23/2025 5100   Final         No results for input(s): \"TSH\", \"UA\", \"PSA\", \"HGBA1C\" in the last 336 hours.    Invalid input(s): \"CBC\", \"CMP\", \"LIPIDS\", \"BMP\", \"MICROALBU\"       "

## 2025-06-25 NOTE — PROGRESS NOTES
Patient febrile 102.7 with tremors.  Transfusion not done, patient sent to ED with family to be evaluated.

## 2025-06-25 NOTE — ED PROVIDER NOTES
History     Chief Complaint   Patient presents with    Fever     HPI  Alejandra Villegas is a 59 year old female who presented today to [facility] for transfusion of rbc due to hemoglobin of 6.6.  She was seen earlier today in clinic and seemed fine.  Her sister is here with her who says that she had been in contact with her multiple times today.  The patient apparently drove her son somewhere and then at about 1:00, or 3 hours ago, she called her sister and said I am not feeling well.  Between then and now she has developed a fever, 102.5 here on arrival.  She is also confused.  When I talked to her she opens her eyes and looks at me but really cannot speak intelligibly.  Her sister has no other information besides the rapid change in mentation.    Allergies:  Allergies   Allergen Reactions    Adhesive Tape     Bee Pollen Swelling     Seasonal    Bee Venom Unknown    Folic Acid Itching    Pollen Extract      Seasonal    Amoxicillin Rash    Chlorhexidine Rash     After several weeks, started to develop rash on R neck down to chest and arm. Also noted on back of knees. Providers suggesting related to CHG as nothing else is new for pt.     Liquid Adhesive Rash    Meloxicam Rash    Nabumetone GI Disturbance     GI upset       Problem List:    Patient Active Problem List    Diagnosis Date Noted    Sepsis with encephalopathy without septic shock (H) 06/25/2025     Priority: Medium    Sepsis with encephalopathy without septic shock, due to unspecified organism (H) 06/25/2025     Priority: Medium    Acute myeloid leukemia (H) 06/11/2025     Priority: Medium    Neutropenic fever 05/18/2025     Priority: Medium    AML (acute myeloblastic leukemia) (H) 05/06/2025     Priority: Medium    Hypokalemia 04/28/2025     Priority: Medium    Anemia of chronic disease 04/27/2025     Priority: Medium    Community acquired pneumonia of left lower lobe of lung 04/27/2025     Priority: Medium    Pancytopenia due to antineoplastic  chemotherapy 04/06/2025     Priority: Medium    Hiatal hernia 04/06/2025     Priority: Medium    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) 03/04/2025     Priority: Medium    Anemia 02/26/2025     Priority: Medium    Leukopenia 02/26/2025     Priority: Medium    Macrocytic anemia 02/13/2025     Priority: Medium    Pulmonary emphysema, unspecified emphysema type (H) 01/23/2024     Priority: Medium    Benign essential hypertension 01/23/2024     Priority: Medium    Mixed hyperlipidemia 01/23/2024     Priority: Medium    Pain in joint, ankle and foot, left 10/05/2022     Priority: Medium    CKD (chronic kidney disease) stage 2, GFR 60-89 ml/min 03/31/2022     Priority: Medium    Osteopenia of right foot 02/03/2021     Priority: Medium    Anxiety state 01/30/2018     Priority: Medium    Other isolated or specific phobias 01/30/2018     Priority: Medium    Degenerative disc disease at L5-S1 level 01/30/2018     Priority: Medium    Hidradenitis suppurativa 01/30/2018     Priority: Medium    Tobacco use disorder 01/30/2018     Priority: Medium    Gastroesophageal reflux disease 01/18/2017     Priority: Medium    Pain management contract broken 06/05/2015     Priority: Medium     Overview:   Contract violation - see 12/1/15 letter.      Urge incontinence 06/04/2014     Priority: Medium    Alopecia areata 02/21/2014     Priority: Medium    Benign paroxysmal positional vertigo 02/28/2013     Priority: Medium    Ovarian cyst, left 05/09/2012     Priority: Medium    Other acquired deformity of ankle and foot(736.79) 05/09/2012     Priority: Medium    RA (rheumatoid arthritis) (H) 05/09/2012     Priority: Medium     Overview:   Diagnosed [place] 2012      Asthma 05/04/2012     Priority: Medium    Seasonal allergic rhinitis 09/27/2011     Priority: Medium    Symptomatic menopausal or female climacteric states 09/21/2010     Priority: Medium    Other diseases of lung, not elsewhere classified 08/01/2007      Priority: Medium        Past Medical History:    Past Medical History:   Diagnosis Date    Allergic rhinitis 09/27/2011    Disorder of kidney and ureter 08/08/2008    Dorsalgia     Generalized anxiety disorder     Hidradenitis suppurativa     Other disorders of lung (CODE) 08/01/2007    Other specified disorders of Eustachian tube, unspecified ear 02/27/2012    Personal history of other medical treatment (CODE)     Rheumatoid arthritis (H) 02/27/2012    Tobacco use        Past Surgical History:    Past Surgical History:   Procedure Laterality Date    ARTHROSCOPY KNEE  05/2017    Dr Torres    ARTHROSCOPY KNEE  07/20/2017    Dr Garza, lateral release, meniscal repair    ARTHROTOMY WRIST Left 2011    Dr. Torres    BONE MARROW BIOPSY, BONE SPECIMEN, NEEDLE/TROCAR Left 02/13/2025    Procedure: Bone marrow biopsy, bone specimen, needle/trocar;  Surgeon: Hema Mari MD;  Location:  OR    CHOLECYSTECTOMY  06/2007    Emergency tracheotomy following failed intubation for laparoscopic cholecystectomy.    DILATION AND CURETTAGE  09/2006         HERNIA REPAIR  2007    Incisoinal hernia repair    HYSTERECTOMY TOTAL ABDOMINAL  02/2010         IMPLANT STIMULATOR AND LEADS SACRAL NERVE (STAGE ONE AND TWO) N/A 12/11/2018    Procedure: Interstim Battery Replacement;  Surgeon: Rl Ambriz MD;  Location:  OR    INTERSTIM DEVICE STAGE 2  01/06/2014    Dr. Ambriz [facility]    LAPAROSCOPIC ABLATION ENDOMETRIOSIS  09/2006         OOPHORECTOMY Left 2010     Dr Thorpe    PICC DOUBLE LUMEN PLACEMENT Left 06/11/2025    medial brachial, 47 cm, 2 cm external length    PICC INSERTION - DOUBLE LUMEN Right 03/05/2025    5FR, DL, total cath length=42 CM, visible cath length= 3CM, brachial medial vein    PICC INSERTION - DOUBLE LUMEN Right 05/06/2025    44-3Ccm, Basilic vein    RECONSTRUCT FOREFOOT WITH METATARSOPHALANGEAL (MTP) FUSION Right 05/05/2022    Procedure: Right fibular sesmoid excision and 1st metatarsal phalangeal joint fusion;   Surgeon: Rl Nathan DPM;  Location: [facility] OR    RELEASE CARPAL TUNNEL  02/02/2017         RELEASE PLANTAR FASCIA Left 12/19/2024    Procedure: excision of soft tissue mass left foot,  gastroc recession;  Surgeon: Rl Nathan DPM;  Location:  OR    REMOVE HARDWARE FOOT Right 08/10/2023    Procedure: REMOVAL, HARDWARE, FOOT;  Surgeon: Rl Nathan DPM;  Location: [facility] OR    SALPINGO OOPHORECTOMY,R/L/KELSEY Right 06/1999    Right salpingo-oophorectomy for hemorrhagic corpus luteum cyst    TRACHEOSTOMY  2007    emergency following failed intubation during cholecystectomy    TYMPANOSTOMY, LOCAL/TOPICAL ANESTHESIA  08/2006    PE tube placement       Family History:    Family History   Problem Relation Age of Onset    Arthritis Mother         Arthritis,Rheumatoid    Cancer Mother         Cancer, lung and uterine cancer    Heart Disease Father         Heart Disease,CAD, CABG, peripheral vascular dise    Thyroid Disease Father         Thyroid Disease, hyperlipidemia    Other - See Comments Father         djd    Asthma Brother         Asthma    Asthma Sister         Asthma    Other - See Comments Sister         Psychiatric illness,Anxiety    Other - See Comments Son         x2 back surgeries    Breast Cancer No family hx of         Cancer-breast       Social History:  Marital Status:   [4]  Social History     Tobacco Use    Smoking status: Every Day     Current packs/day: 1.00     Average packs/day: 1 pack/day for 44.5 years (44.5 ttl pk-yrs)     Types: Cigarettes     Start date: 1981     Passive exposure: Past    Smokeless tobacco: Never    Tobacco comments:     Passive exposure in childhood and adult homes and some work places   Vaping Use    Vaping status: Never Used   Substance Use Topics    Alcohol use: No     Alcohol/week: 0.0 standard drinks of alcohol    Drug use: No        Medications:    acetaminophen (TYLENOL) 325 MG tablet  acyclovir (ZOVIRAX) 400 MG tablet  albuterol (PROAIR HFA/PROVENTIL  HFA/VENTOLIN HFA) 108 (90 Base) MCG/ACT inhaler  albuterol (PROVENTIL) (2.5 MG/3ML) 0.083% neb solution  budesonide-formoterol (SYMBICORT) 80-4.5 MCG/ACT Inhaler  calcium carbonate (TUMS) 500 MG chewable tablet  cefpodoxime (VANTIN) 200 MG tablet  cyanocobalamin (VITAMIN B-12) 500 MCG tablet  cyclobenzaprine (FLEXERIL) 10 MG tablet  famotidine (PEPCID) 20 MG tablet  fluticasone (FLONASE) 50 MCG/ACT nasal spray  gabapentin (NEURONTIN) 400 MG capsule  hydrOXYzine HCl (ATARAX) 25 MG tablet  lisinopril (ZESTRIL) 10 MG tablet  loratadine (CLARITIN) 10 MG tablet  ondansetron (ZOFRAN ODT) 4 MG ODT tab  order for DME  pantoprazole (PROTONIX) 40 MG EC tablet  PARoxetine (PAXIL) 20 MG tablet  posaconazole (NOXAFIL) 100 MG DR tablet  potassium chloride ER (K-TAB) 20 MEQ CR tablet  prednisoLONE acetate (PRED FORTE) 1 % ophthalmic suspension  prochlorperazine (COMPAZINE) 5 MG tablet  rosuvastatin (CRESTOR) 20 MG tablet  sucralfate (CARAFATE) 1 GM/10ML suspension  SUMAtriptan (IMITREX) 50 MG tablet  Vitamin D3 (CHOLECALCIFEROL) 25 mcg (1000 units) tablet          Review of Systems   Unable to perform ROS: Acuity of condition       Physical Exam   BP: (!) 128/96  Pulse: 117  Temp: (!) 102.5  F (39.2  C)  Resp: 17  Weight: 79.8 kg (176 lb)  SpO2: 94 %      Physical Exam  Vitals and nursing note reviewed.   HENT:      Head: Normocephalic and atraumatic.      Mouth/Throat:      Mouth: Mucous membranes are moist.   Eyes:      Conjunctiva/sclera: Conjunctivae normal.   Cardiovascular:      Rate and Rhythm: Normal rate and regular rhythm.      Comments: 2 out of 6 systolic murmur  Pulmonary:      Effort: Pulmonary effort is normal.      Breath sounds: Normal breath sounds.   Abdominal:      General: Abdomen is flat.   Skin:     General: Skin is warm and dry.   Neurological:      General: No focal deficit present.      Mental Status: She is alert. She is disoriented.         ED Course        Procedures                  Recent Results  (from the past 24 hours)   CBC with Platelets & Differential    Narrative    The following orders were created for panel order CBC with Platelets & Differential.  Procedure                               Abnormality         Status                     ---------                               -----------         ------                     CBC with platelets and ...[[id number]]  Abnormal            Final result               RBC and Platelet Morpho...[[id number]]                      Final result               Manual Differential[[id number]]                             Final result                 Please view results for these tests on the individual orders.   Comprehensive metabolic panel   Result Value Ref Range    Sodium 142 135 - 145 mmol/L    Potassium 3.1 (L) 3.4 - 5.3 mmol/L    Carbon Dioxide (CO2) 27 22 - 29 mmol/L    Anion Gap 14 7 - 15 mmol/L    Urea Nitrogen 9.1 6.0 - 20.0 mg/dL    Creatinine 0.64 0.51 - 0.95 mg/dL    GFR Estimate >90 >60 mL/min/1.73m2    Calcium 9.7 8.8 - 10.4 mg/dL    Chloride 101 98 - 107 mmol/L    Glucose 115 (H) 70 - 99 mg/dL    Alkaline Phosphatase 169 (H) 40 - 150 U/L    AST 22 0 - 45 U/L    ALT 14 0 - 50 U/L    Protein Total 6.9 6.4 - 8.3 g/dL    Albumin 3.7 3.5 - 5.2 g/dL    Bilirubin Total 0.3 <=1.2 mg/dL   CBC with platelets and differential   Result Value Ref Range    WBC Count 7.2 4.0 - 11.0 10e3/uL    RBC Count 2.07 (L) 3.80 - 5.20 10e6/uL    Hemoglobin 6.6 (LL) 11.7 - 15.7 g/dL    Hematocrit 20.3 (L) 35.0 - 47.0 %    MCV 98 78 - 100 fL    MCH 31.9 26.5 - 33.0 pg    MCHC 32.5 31.5 - 36.5 g/dL    RDW 18.7 (H) 10.0 - 15.0 %    Platelet Count 54 (L) 150 - 450 10e3/uL   Manual Differential   Result Value Ref Range    % Neutrophils 54 %    % Lymphocytes 32 %    % Monocytes 11 %    % Eosinophils 3 %    % Basophils 0 %    Absolute Neutrophils 3.9 1.6 - 8.3 10e3/uL    Absolute Lymphocytes 2.3 0.8 - 5.3 10e3/uL    Absolute Monocytes 0.8 0.0 - 1.3 10e3/uL    Absolute Eosinophils 0.2 0.0 -  0.7 10e3/uL    Absolute Basophils 0.0 0.0 - 0.2 10e3/uL   RBC and Platelet Morphology   Result Value Ref Range    RBC Morphology Confirmed RBC Indices     Platelet Assessment  Automated Count Confirmed. Platelet morphology is normal.     Automated Count Confirmed. Platelet morphology is normal.   Prepare red blood cells (unit)   Result Value Ref Range    Blood Component Type Red Blood Cells     Product Code M0107B73     Unit Status Issued     Unit Number [id number]     CROSSMATCH Compatible     CODING SYSTEM [id number]     ISSUE DATE AND TIME 6/25/2025  4:26:00 PM CDT     UNIT ABO/RH O+     UNIT TYPE ISBT 5100    [place] Draw    Narrative    The following orders were created for panel order [place] Draw.  Procedure                               Abnormality         Status                     ---------                               -----------         ------                     Extra Blue Top Tube[[id number]]                             Final result               Extra Red Top Tube[[id number]]                              Final result               Extra Green Top (Lithiu...[[id number]]                      Final result               Extra Purple Top Tube[[id number]]                           Final result               Extra Green Top (Lithiu...[[id number]]                      Final result                 Please view results for these tests on the individual orders.   Extra Blue Top Tube   Result Value Ref Range    Hold Specimen x    Extra Red Top Tube   Result Value Ref Range    Hold Specimen x    Extra Green Top (Lithium Heparin) Tube   Result Value Ref Range    Hold Specimen x    Extra Purple Top Tube   Result Value Ref Range    Hold Specimen x    Extra Green Top (Lithium Heparin) ON ICE   Result Value Ref Range    Hold Specimen x    Lactic Acid Whole Blood with 1X Repeat in 2 HR when >2   Result Value Ref Range    Lactic Acid, Initial 1.9 0.7 - 2.0 mmol/L   Blood gas venous   Result Value Ref Range    pH Venous  7.52 (H) 7.32 - 7.43    pCO2 Venous 33 (L) 40 - 50 mm Hg    pO2 Venous 44 25 - 47 mm Hg    Bicarbonate Venous 27 21 - 28 mmol/L    Base Excess/Deficit Venous 3.6 (H) -3.0 - 3.0 mmol/L    FIO2 21     Oxyhemoglobin Venous 81 (H) 70 - 75 %    O2 Sat, Venous 87.7 (H) 70.0 - 75.0 %    Narrative    In healthy individuals, oxyhemoglobin (O2Hb) and oxygen saturation (SO2) are approximately equal. In the presence of dyshemoglobins, oxyhemoglobin can be considerably lower than oxygen saturation.   CBC with platelets differential    Narrative    The following orders were created for panel order CBC with platelets differential.  Procedure                               Abnormality         Status                     ---------                               -----------         ------                     CBC with platelets and ...[[id number]]  Abnormal            Final result               RBC and Platelet Morpho...[[id number]]                      Final result               Manual Differential[[id number]]         Abnormal            Final result                 Please view results for these tests on the individual orders.   Comprehensive metabolic panel   Result Value Ref Range    Sodium 139 135 - 145 mmol/L    Potassium 3.0 (L) 3.4 - 5.3 mmol/L    Carbon Dioxide (CO2) 23 22 - 29 mmol/L    Anion Gap 18 (H) 7 - 15 mmol/L    Urea Nitrogen 11.7 6.0 - 20.0 mg/dL    Creatinine 0.67 0.51 - 0.95 mg/dL    GFR Estimate >90 >60 mL/min/1.73m2    Calcium 9.5 8.8 - 10.4 mg/dL    Chloride 98 98 - 107 mmol/L    Glucose 121 (H) 70 - 99 mg/dL    Alkaline Phosphatase 174 (H) 40 - 150 U/L    AST 25 0 - 45 U/L    ALT 14 0 - 50 U/L    Protein Total 6.5 6.4 - 8.3 g/dL    Albumin 3.6 3.5 - 5.2 g/dL    Bilirubin Total 0.4 <=1.2 mg/dL   CBC with platelets and differential   Result Value Ref Range    WBC Count 9.3 4.0 - 11.0 10e3/uL    RBC Count 1.87 (L) 3.80 - 5.20 10e6/uL    Hemoglobin 6.0 (LL) 11.7 - 15.7 g/dL    Hematocrit 17.8 (L) 35.0 - 47.0 %    MCV  95 78 - 100 fL    MCH 32.1 26.5 - 33.0 pg    MCHC 33.7 31.5 - 36.5 g/dL    RDW 18.6 (H) 10.0 - 15.0 %    Platelet Count 49 (LL) 150 - 450 10e3/uL   Manual Differential   Result Value Ref Range    % Neutrophils 79 %    % Lymphocytes 5 %    % Monocytes 11 %    % Eosinophils 1 %    % Basophils 0 %    % Metamyelocytes 2 %    % Myelocytes 2 %    Absolute Neutrophils 7.3 1.6 - 8.3 10e3/uL    Absolute Lymphocytes 0.5 (L) 0.8 - 5.3 10e3/uL    Absolute Monocytes 1.0 0.0 - 1.3 10e3/uL    Absolute Eosinophils 0.1 0.0 - 0.7 10e3/uL    Absolute Basophils 0.0 0.0 - 0.2 10e3/uL    Absolute Metamyelocytes 0.2 (H) <=0.0 10e3/uL    Absolute Myelocytes 0.2 (H) <=0.0 10e3/uL   RBC and Platelet Morphology   Result Value Ref Range    RBC Morphology Confirmed RBC Indices     Platelet Assessment  Automated Count Confirmed. Platelet morphology is normal.     Automated Count Confirmed. Platelet morphology is normal.   Magnesium   Result Value Ref Range    Magnesium 1.5 (L) 1.7 - 2.3 mg/dL   Procalcitonin   Result Value Ref Range    Procalcitonin 0.27 <0.50 ng/mL   UA Macroscopic with reflex to Microscopic and Culture    Specimen: Urine, Straight Catheter   Result Value Ref Range    Color Urine Light Yellow Colorless, Straw, Light Yellow, Yellow    Appearance Urine Clear Clear    Glucose Urine Negative Negative mg/dL    Bilirubin Urine Negative Negative    Ketones Urine Negative Negative mg/dL    Specific Gravity Urine 1.024 1.000 - 1.030    Blood Urine Moderate (A) Negative    pH Urine 6.5 5.0 - 9.0    Protein Albumin Urine 20 (A) Negative mg/dL    Urobilinogen Urine Normal Normal mg/dL    Nitrite Urine Negative Negative    Leukocyte Esterase Urine Negative Negative    RBC Urine 12 (H) <=2 /HPF    WBC Urine 6 (H) <=5 /HPF    Narrative    Urine Culture not indicated   Prepare red blood cells (unit)   Result Value Ref Range    Blood Component Type Red Blood Cells     Product Code [id number]     Unit Status Ready for issue     Unit Number  [id number]     CROSSMATCH Compatible     CODING SYSTEM [id number]    XR Chest Port 1 View    Narrative    PROCEDURE:  XR CHEST PORT 1 VIEW    HISTORY:  Fever.     COMPARISON:  None.    FINDINGS:   The cardiac silhouette is normal in size. The pulmonary vasculature is  normal.  There is interstitial thickening seen in both lungs. This  represents a change from 5/18/2025. No pleural effusion or  pneumothorax.      Impression    IMPRESSION:  Interstitial thickening which is new as compared to  5/18/2025      ISABELLA SANTANA MD         SYSTEM ID:  [id number]   CT Head w/o Contrast    Narrative    EXAM: CT HEAD W/O CONTRAST  LOCATION: [facility]  DATE: 6/25/2025    INDICATION: acute mental status change  COMPARISON: None.  TECHNIQUE: Routine CT Head without IV contrast. Multiplanar reformats. Dose reduction techniques were used.    FINDINGS:  INTRACRANIAL CONTENTS: No intracranial hemorrhage, extraaxial collection, or mass effect.  No CT evidence of acute infarct. Normal parenchymal attenuation. Normal ventricles and sulci.     VISUALIZED ORBITS/SINUSES/MASTOIDS: No intraorbital abnormality. Left maxillary sinus mucosal retention cyst versus polyp.  No middle ear or mastoid effusion.    BONES/SOFT TISSUES: No acute abnormality.      Impression    IMPRESSION:  1.  No acute intracranial process.       Medications   sodium chloride (PF) 0.9% PF flush 3 mL (has no administration in time range)   sodium chloride (PF) 0.9% PF flush 3 mL (3 mLs Intracatheter $Given 6/25/25 1604)   vancomycin (VANCOCIN) 2,000 mg in 0.9% NaCl 500 mL intermittent infusion (2,000 mg Intravenous $New Bag 6/25/25 1640)   potassium chloride sebastian ER (KLOR-CON M20) CR tablet 20 mEq (has no administration in time range)   magnesium sulfate 4 g in 100 mL sterile water intermittent infusion (4 g Intravenous $New Bag 6/25/25 1756)   ceFEPIme (MAXIPIME) 2 g vial to attach to  mL bag for ADULTS or NS 50 mL bag for PEDS (0 g  Intravenous Stopped 6/25/25 1750)   acetaminophen (OFIRMEV) infusion 1,000 mg (0 mg Intravenous Stopped 6/25/25 1621)   sodium chloride 0.9% BOLUS 1,000 mL (1,000 mLs Intravenous $New Bag 6/25/25 1604)   potassium chloride sebastian ER (KLOR-CON M20) CR tablet 40 mEq (40 mEq Oral $Given 6/25/25 1755)       Assessments & Plan (with Medical Decision Making)     I have reviewed the nursing notes.    I have reviewed the findings, diagnosis, plan and need for follow up with the patient.  Patient here with acute onset of fever and mental status change.  Upon arrival the sepsis order set was initiated.  Vital signs were stable she was given a 1 L bolus of fluids but did not do the full sepsis bundle as I do not believe she needed that.  Laboratory markers actually fairly reassuring.  It is unclear what the cause of her sepsis is.  She was therefore given unknown source of sepsis antibiotics of cefepime and vancomycin.  The chest x-ray does show some bilateral interstitial thickening so this could represent pneumonia.  She also received a platelet transfusion just a couple of days ago and this could be source but she does have some bruising and hematoma at the site of that infusion, however no significant erythema or cellulitis.  Spoke with hospitalist, Dr. Welsh, who has accepted the patient for admission to the medical floor.        New Prescriptions    No medications on file       Final diagnoses:   Sepsis with encephalopathy without septic shock, due to unspecified organism (H)       6/25/2025   [facility]       Stiven Wren MD  06/25/25 1816

## 2025-06-25 NOTE — ED NOTES
Patient straight catheterized for STAT UA.  Ice packs applied to armpits and cool rag to forehead for fever.

## 2025-06-25 NOTE — PHARMACY-VANCOMYCIN DOSING SERVICE
Pharmacy Vancomycin Initial Note  Date of Service 2025  Patient's  1965  59 year old, female    Indication: Sepsis    Current estimated CrCl = Estimated Creatinine Clearance: 99.1 mL/min (based on SCr of 0.64 mg/dL).    Creatinine for last 3 days  2025: 12:45 PM Creatinine 0.61 mg/dL  2025: 12:58 PM Creatinine 0.64 mg/dL    Recent Vancomycin Level(s) for last 3 days  No results found for requested labs within last 3 days.      Vancomycin IV Administrations (past 72 hours)        No vancomycin orders with administrations in past 72 hours.                    Nephrotoxins and other renal medications (From now, onward)      Start     Dose/Rate Route Frequency Ordered Stop    25 1630  vancomycin (VANCOCIN) 2,000 mg in 0.9% NaCl 500 mL intermittent infusion         2,000 mg  over 2 Hours Intravenous ONCE 25 1559              Contrast Orders - past 72 hours (72h ago, onward)      None            [employer] Prediction of Planned Initial Vancomycin Regimen  Loading dose: 2000 mg at 16:30 2025.  Regimen: 1000 mg IV every 12 hours.  Start time: 04:30 on 2025  Exposure target: AUC24 (range) 400-600 mg/L.hr   AUC24,ss: 438 mg/L.hr  Probability of AUC24 > 400: 59 %  Ctrough,ss: 13.2 mg/L  Probability of Ctrough,ss > 20: 20 %  Probability of nephrotoxicity (Lodise VANESSA ): 8 %        Plan:  Start vancomycin  2000 mg IV pnce  Vancomycin monitoring method: AUC  Vancomycin therapeutic monitoring goal: 400-600 mg*h/L  Pharmacy will check vancomycin levels as appropriate in 1-3 Days.    Serum creatinine levels will be ordered daily for the first week of therapy and at least twice weekly for subsequent weeks.      Karishma Wilson RPH

## 2025-06-25 NOTE — PHARMACY-ADMISSION MEDICATION HISTORY
Pharmacist Admission Medication History    Admission medication history is complete. The information provided in this note is only as accurate as the sources available at the time of the update.    Information Source(s): Patient and CareEverywhere/SureScripts via in-person    Pertinent Information: Patient easily discussed medications. Denies the use of ibuprofen, topical medication, ear drops, and eye drops. Does attest to getting all medications with the exception of Symbicort AM of admission. Does state she has held off on carafate, pepcid, and pantoprazole as she was instructed not to take antacids with cefpodoxime.     Changes made to PTA medication list:  Added: None  Deleted: None  Changed: None    Allergies reviewed with patient and updates made in EHR: yes    Medication History Completed By: Karishma Wilson RPH 6/25/2025 6:54 PM    PTA Med List   Medication Sig Last Dose/Taking    acetaminophen (TYLENOL) 325 MG tablet Take 2 tablets (650 mg) by mouth every 4 hours as needed for mild pain. 6/25/2025 Morning    albuterol (PROAIR HFA/PROVENTIL HFA/VENTOLIN HFA) 108 (90 Base) MCG/ACT inhaler Inhale 1-2 puffs into the lungs 4 times daily as needed (Refractory bronchospasm associated with hypersensitivity reaction). Past Week    albuterol (PROVENTIL) (2.5 MG/3ML) 0.083% neb solution Take 1 vial (2.5 mg) by nebulization 4 times daily as needed (Refractory bronchospasm associated with hypersensitivity reaction). Past Week    budesonide-formoterol (SYMBICORT) 80-4.5 MCG/ACT Inhaler Inhale 2 puffs into the lungs 2 times daily - Rinse mouth well after use to prevent Thrush 6/24/2025 Bedtime    calcium carbonate (TUMS) 500 MG chewable tablet Take 1 tablet (500 mg) by mouth 3 times daily as needed for heartburn. Past Week    cefpodoxime (VANTIN) 200 MG tablet Take 1 tablet (200 mg) by mouth 2 times daily. 6/25/2025    cyclobenzaprine (FLEXERIL) 10 MG tablet Take 1 tablet (10 mg) by mouth 3 times daily as needed for  muscle spasms. Past Month    famotidine (PEPCID) 20 MG tablet Take 1 tablet (20 mg) by mouth 2 times daily. Past Week    fluticasone (FLONASE) 50 MCG/ACT nasal spray Spray 2 sprays into both nostrils 2 times daily. 6/25/2025 Morning    gabapentin (NEURONTIN) 400 MG capsule Take 1 capsule (400 mg) by mouth 3 times daily. 6/25/2025 Morning    hydrOXYzine HCl (ATARAX) 25 MG tablet Take 1-2 tablets (25-50 mg) by mouth every 6 hours as needed for anxiety or itching (adjuvant pain, sleep). Past Week    lisinopril (ZESTRIL) 10 MG tablet Take 1 tablet (10 mg) by mouth daily. 6/25/2025 Morning    loratadine (CLARITIN) 10 MG tablet Take 1 tablet (10 mg) by mouth daily. 6/25/2025 Morning    ondansetron (ZOFRAN ODT) 4 MG ODT tab Take 1-2 tablets (4-8 mg) by mouth every 8 hours as needed for nausea or vomiting. Past Month    pantoprazole (PROTONIX) 40 MG EC tablet Take 1 tablet (40 mg) by mouth 2 times daily (before meals). Past Week    PARoxetine (PAXIL) 20 MG tablet Take 1 tablet (20 mg) by mouth every morning. 6/25/2025 Morning    prochlorperazine (COMPAZINE) 5 MG tablet Take 1 tablet (5 mg) by mouth every 6 hours as needed for nausea or vomiting. Past Month    rosuvastatin (CRESTOR) 20 MG tablet Take 1 tablet (20 mg) by mouth daily. 6/25/2025 Morning    sucralfate (CARAFATE) 1 GM/10ML suspension Take 10 mLs (1 g) by mouth 4 times daily (before meals and nightly). Past Week    SUMAtriptan (IMITREX) 50 MG tablet Take 1 tablet (50 mg) by mouth at onset of headache for migraine. Past Month    Vitamin D3 (CHOLECALCIFEROL) 25 mcg (1000 units) tablet Take 1 tablet (25 mcg) by mouth daily. 6/25/2025 Morning

## 2025-06-25 NOTE — H&P
[facility]    History and Physical - Hospitalist Service       Date of Admission:  6/25/2025    Assessment & Plan      Alejandra Villegas is a 59 year old female admitted on 6/25/2025. She is currently undergoing treatment for AML. She has a history of pancytopenia, HTN, pulmonary emphysema, and CKD stage 2. She presented today for a rbc infusion and on arrival she was febrile at 102.5 with altered mental status.     Primary problem    Sepsis with encephalopathy without septic shock  Sepsis protocol initiated  Altered mental status and fever of unknown origin, possible pneumonia   Tmax of 102.5, low platelets at 49, low hemoglobin at 6.0, WBC normal at 9.3  Start Vancomycin, Cefepime   Continue RBC infusion for low hemoglobin & rbc  Magnesium low at 1.5   Blood cultures pending      Pancytopenia  WBC normal at 9.3, Platelets low at 49, RBC low at 1.87, hemoglobin low at 6.0  Platelets were 2 two days ago and received transfusion  Recent Neulasta, no longer neutropenic  RBC transfusion completed in ED  Recheck hemoglobin and trend      AML  Completed cycle 2 of HiDAC on 5/9/25  Continue Acyclovir for CMV prophylaxis       Emphysema  Budesonide and Duoneb scheduled to replace home inhalers      HTN  Hold lisinopril       CKD stage 2  Stable   BUN 9.1, Creatinine 0.64      Hypomagnesemia  Admit 1.5, replace        Diet: Combination Diet Regular Diet Adult  DVT Prophylaxis: SCDs  Beckett Catheter: Not present  Lines: None     Cardiac Monitoring: None  Code Status: Full Code    Clinically Significant Risk Factors Present on Admission        # Hypokalemia: Lowest K = 3 mmol/L in last 2 days, will replace as needed      # Hypomagnesemia: Lowest Mg = 1.5 mg/dL in last 2 days, will replace as needed     # Thrombocytopenia: Lowest platelets = 49 in last 2 days, will monitor for bleeding   # Hypertension: Noted on problem list        # Anemia: based on hgb <11       # Overweight: Estimated body mass index  "is 29.21 kg/m  as calculated from the following:    Height as of this encounter: 1.676 m (5' 6\").    Weight as of this encounter: 82.1 kg (181 lb).         # Financial/Environmental Concerns:    # Asthma: noted on problem list        Disposition Plan     Medically Ready for Discharge: Anticipated in 2-4 Days         The patient's care was discussed with the Patient.    Dipak Welsh MD  Hospitalist Service  [facility]  Securely message with [employer] (more info)  Text page via [facility] Paging/Directory     ______________________________________________________________________    Chief Complaint   Fevers    History is obtained from the patient    History of Present Illness   Alejandra Villegas is a 59 year old female who presented today to the infusion center for transfusion of rbc due to hemoglobin of 6.6. She was seen earlier in the clinic and seemed fine. Her sister is at the bedside and states she had been in contact with her multiple times today. The patient reports she was driving with her son and then around 1:00pm, she told her sister she was not feeling well. She developed a fever soon after with a Temp of 102.5 on arrival to the ED. She was confused and could not speak intelligibly at the ED. She is now speaking more clearly and is able to carry conversation. Denies any symptoms other than fatigue.      Past Medical History    Past Medical History:   Diagnosis Date    Allergic rhinitis 09/27/2011         Disorder of kidney and ureter 08/08/2008    Kidney disease GFR 87    Dorsalgia     No Comments Provided    Generalized anxiety disorder     No Comments Provided    Hidradenitis suppurativa     No Comments Provided    Other disorders of lung (CODE) 08/01/2007    Pulmonary nodules, stable on follow-up chest CT 12/08.  No further imaging recommended.    Other specified disorders of Eustachian tube, unspecified ear 02/27/2012         Personal history of other medical treatment (CODE)     " Childbirth x4    Rheumatoid arthritis (H) 02/27/2012         Tobacco use     No Comments Provided       Past Surgical History   Past Surgical History:   Procedure Laterality Date    ARTHROSCOPY KNEE  05/2017    Dr Torres    ARTHROSCOPY KNEE  07/20/2017    Dr Garza, lateral release, meniscal repair    ARTHROTOMY WRIST Left 2011    Dr. Torres    BONE MARROW BIOPSY, BONE SPECIMEN, NEEDLE/TROCAR Left 02/13/2025    Procedure: Bone marrow biopsy, bone specimen, needle/trocar;  Surgeon: Hema Mari MD;  Location:  OR    CHOLECYSTECTOMY  06/2007    Emergency tracheotomy following failed intubation for laparoscopic cholecystectomy.    DILATION AND CURETTAGE  09/2006         HERNIA REPAIR  2007    Incisoinal hernia repair    HYSTERECTOMY TOTAL ABDOMINAL  02/2010         IMPLANT STIMULATOR AND LEADS SACRAL NERVE (STAGE ONE AND TWO) N/A 12/11/2018    Procedure: Interstim Battery Replacement;  Surgeon: Rl Ambriz MD;  Location:  OR    INTERSTIM DEVICE STAGE 2  01/06/2014    Dr. Ambriz - [facility]    LAPAROSCOPIC ABLATION ENDOMETRIOSIS  09/2006         OOPHORECTOMY Left 2010     Dr Thorpe    PICC DOUBLE LUMEN PLACEMENT Left 06/11/2025    medial brachial, 47 cm, 2 cm external length    PICC INSERTION - DOUBLE LUMEN Right 03/05/2025    5FR, DL, total cath length=42 CM, visible cath length= 3CM, brachial medial vein    PICC INSERTION - DOUBLE LUMEN Right 05/06/2025    44-3Ccm, Basilic vein    RECONSTRUCT FOREFOOT WITH METATARSOPHALANGEAL (MTP) FUSION Right 05/05/2022    Procedure: Right fibular sesmoid excision and 1st metatarsal phalangeal joint fusion;  Surgeon: Rl Nathan DPM;  Location:  OR    RELEASE CARPAL TUNNEL  02/02/2017         RELEASE PLANTAR FASCIA Left 12/19/2024    Procedure: excision of soft tissue mass left foot,  gastroc recession;  Surgeon: Rl Nathan DPM;  Location:  OR    REMOVE HARDWARE FOOT Right 08/10/2023    Procedure: REMOVAL, HARDWARE, FOOT;  Surgeon: Rl Nathan DPM;   Location: [facility] OR    SALPINGO OOPHORECTOMY,R/L/KELSEY Right 06/1999    Right salpingo-oophorectomy for hemorrhagic corpus luteum cyst    TRACHEOSTOMY  2007    emergency following failed intubation during cholecystectomy    TYMPANOSTOMY, LOCAL/TOPICAL ANESTHESIA  08/2006    PE tube placement       Prior to Admission Medications   Prior to Admission Medications   Prescriptions Last Dose Informant Patient Reported? Taking?   PARoxetine (PAXIL) 20 MG tablet 6/25/2025 Morning  No Yes   Sig: Take 1 tablet (20 mg) by mouth every morning.   SUMAtriptan (IMITREX) 50 MG tablet Past Month  No Yes   Sig: Take 1 tablet (50 mg) by mouth at onset of headache for migraine.   Vitamin D3 (CHOLECALCIFEROL) 25 mcg (1000 units) tablet 6/25/2025 Morning  No Yes   Sig: Take 1 tablet (25 mcg) by mouth daily.   acetaminophen (TYLENOL) 325 MG tablet 6/25/2025 Morning  No Yes   Sig: Take 2 tablets (650 mg) by mouth every 4 hours as needed for mild pain.   acyclovir (ZOVIRAX) 400 MG tablet Morning  No No   Sig: Take 1 tablet (400 mg) by mouth 2 times daily.   albuterol (PROAIR HFA/PROVENTIL HFA/VENTOLIN HFA) 108 (90 Base) MCG/ACT inhaler Past Week  No Yes   Sig: Inhale 1-2 puffs into the lungs 4 times daily as needed (Refractory bronchospasm associated with hypersensitivity reaction).   albuterol (PROVENTIL) (2.5 MG/3ML) 0.083% neb solution Past Week  No Yes   Sig: Take 1 vial (2.5 mg) by nebulization 4 times daily as needed (Refractory bronchospasm associated with hypersensitivity reaction).   budesonide-formoterol (SYMBICORT) 80-4.5 MCG/ACT Inhaler 6/24/2025 Bedtime  No Yes   Sig: Inhale 2 puffs into the lungs 2 times daily - Rinse mouth well after use to prevent Thrush   calcium carbonate (TUMS) 500 MG chewable tablet Past Week  No Yes   Sig: Take 1 tablet (500 mg) by mouth 3 times daily as needed for heartburn.   cefpodoxime (VANTIN) 200 MG tablet 6/25/2025  No Yes   Sig: Take 1 tablet (200 mg) by mouth 2 times daily.   cyanocobalamin (VITAMIN  B-12) 500 MCG tablet Morning  Yes No   Sig: Take 500 mcg by mouth daily.   cyclobenzaprine (FLEXERIL) 10 MG tablet Past Month  No Yes   Sig: Take 1 tablet (10 mg) by mouth 3 times daily as needed for muscle spasms.   famotidine (PEPCID) 20 MG tablet Past Week  No Yes   Sig: Take 1 tablet (20 mg) by mouth 2 times daily.   fluticasone (FLONASE) 50 MCG/ACT nasal spray 6/25/2025 Morning  No Yes   Sig: Spray 2 sprays into both nostrils 2 times daily.   gabapentin (NEURONTIN) 400 MG capsule 6/25/2025 Morning  No Yes   Sig: Take 1 capsule (400 mg) by mouth 3 times daily.   hydrOXYzine HCl (ATARAX) 25 MG tablet Past Week  No Yes   Sig: Take 1-2 tablets (25-50 mg) by mouth every 6 hours as needed for anxiety or itching (adjuvant pain, sleep).   lisinopril (ZESTRIL) 10 MG tablet 6/25/2025 Morning  No Yes   Sig: Take 1 tablet (10 mg) by mouth daily.   loratadine (CLARITIN) 10 MG tablet 6/25/2025 Morning  No Yes   Sig: Take 1 tablet (10 mg) by mouth daily.   ondansetron (ZOFRAN ODT) 4 MG ODT tab Past Month  No Yes   Sig: Take 1-2 tablets (4-8 mg) by mouth every 8 hours as needed for nausea or vomiting.   order for DME   No No   Sig: Equipment being ordered: home neb set up with mask and tubing   pantoprazole (PROTONIX) 40 MG EC tablet Past Week  No Yes   Sig: Take 1 tablet (40 mg) by mouth 2 times daily (before meals).   posaconazole (NOXAFIL) 100 MG DR tablet   No No   Sig: Take 3 tablets (300 mg) by mouth every morning.   potassium chloride ER (K-TAB) 20 MEQ CR tablet 6/25/2025  No Yes   Sig: Take 1 tablet (20 mEq) by mouth daily.   prochlorperazine (COMPAZINE) 5 MG tablet Past Month  No Yes   Sig: Take 1 tablet (5 mg) by mouth every 6 hours as needed for nausea or vomiting.   rosuvastatin (CRESTOR) 20 MG tablet 6/25/2025 Morning  No Yes   Sig: Take 1 tablet (20 mg) by mouth daily.   sucralfate (CARAFATE) 1 GM/10ML suspension Past Week  Yes Yes   Sig: Take 10 mLs (1 g) by mouth 4 times daily (before meals and nightly).       Facility-Administered Medications: None        Review of Systems    General: Endorses fevers, chills, sweats, weakness  Eyes: negative for blurred vision, loss of vision  Ear Nose and Throat: negative for pharyngitis, speech or swallowing difficulties  Respiratory:  negative for sputum production, SALCEDO, pleuritic pain, sob or cough  Cardiology:  negative for chest pain, palpitations, orthopnea, PND, edema, syncope   Gastrointestinal: negative for abdominal pain, nausea, vomiting, diarrhea, constipation, hematemesis, melena or hematochezia  Genitourinary: negative for frequency, urgency, dysuria, hematuria   Neurological: negative for focal weakness, paresthesia    Social History   I have reviewed this patient's social history and updated it with pertinent information if needed.  Social History     Tobacco Use    Smoking status: Every Day     Current packs/day: 1.00     Average packs/day: 1 pack/day for 44.5 years (44.5 ttl pk-yrs)     Types: Cigarettes     Start date: 1981     Passive exposure: Past    Smokeless tobacco: Never    Tobacco comments:     Passive exposure in childhood and adult homes and some work places   Vaping Use    Vaping status: Never Used   Substance Use Topics    Alcohol use: No     Alcohol/week: 0.0 standard drinks of alcohol    Drug use: No         Family History   I have reviewed this patient's family history and updated it with pertinent information if needed.  Family History   Problem Relation Age of Onset    Arthritis Mother         Arthritis,Rheumatoid    Cancer Mother         Cancer, lung and uterine cancer    Heart Disease Father         Heart Disease,CAD, CABG, peripheral vascular dise    Thyroid Disease Father         Thyroid Disease, hyperlipidemia    Other - See Comments Father         djd    Asthma Brother         Asthma    Asthma Sister         Asthma    Other - See Comments Sister         Psychiatric illness,Anxiety    Other - See Comments Son         x2 back surgeries    Breast  Cancer No family hx of         Cancer-breast         Allergies   Allergies   Allergen Reactions    Bee Pollen Swelling     Seasonal    Adhesive Tape Rash    Amoxicillin Rash    Chlorhexidine Rash     After several weeks, started to develop rash on R neck down to chest and arm. Also noted on back of knees. Providers suggesting related to CHG as nothing else is new for pt.     Folic Acid Itching    Liquid Adhesive Rash    Meloxicam Rash    Nabumetone GI Disturbance     GI upset    Pollen Extract      Seasonal        Physical Exam   Vital Signs: Temp: 98.2  F (36.8  C) Temp src: Tympanic BP: 120/55 Pulse: 86   Resp: 26 SpO2: 92 % O2 Device: None (Room air)    Weight: 181 lbs 0 oz    General Appearance: Ill appearing. Alert and oriented.   Eyes: EOMI, PERRL, no conjunctival injection  OroPharynx Exam: Normal pharynx.  Neck Exam: Supple, no masses or nodes.  Chest/Respiratory Exam: Normal chest wall and respirations. Wheezing present bilaterally.  Cardiovascular Exam: Regular rate and rhythm. S1, S2, no murmur, click, gallop, or rubs.  Gastrointestinal Exam: Soft, nontender, no abnormal masses or organomegaly.  Extremities: No lower extremity edema.  Neuro Exam: Alert, oriented x 3. CN II-XI intact. Strength symmetric upper and lower extremities  Psychiatric: Normal affect and mentation  Skin: No rashes    Medical Decision Making             Data     I have personally reviewed the following data over the past 24 hrs:    9.3  \   6.0 (LL)   / 49 (LL)     139 98 11.7 /  121 (H)   3.0 (L) 23 0.67 \     ALT: 14 AST: 25 AP: 174 (H) TBILI: 0.4   ALB: 3.6 TOT PROTEIN: 6.5 LIPASE: N/A     Procal: 0.27 CRP: N/A Lactic Acid: 1.9         Imaging results reviewed over the past 24 hrs:   Recent Results (from the past 24 hours)   XR Chest Port 1 View    Narrative    PROCEDURE:  XR CHEST PORT 1 VIEW    HISTORY:  Fever.     COMPARISON:  None.    FINDINGS:   The cardiac silhouette is normal in size. The pulmonary vasculature is  normal.   There is interstitial thickening seen in both lungs. This  represents a change from 5/18/2025. No pleural effusion or  pneumothorax.      Impression    IMPRESSION:  Interstitial thickening which is new as compared to  5/18/2025      ISABELLA SANTANA MD         SYSTEM ID:  [id number]   CT Head w/o Contrast    Narrative    EXAM: CT HEAD W/O CONTRAST  LOCATION: [facility]  DATE: 6/25/2025    INDICATION: acute mental status change  COMPARISON: None.  TECHNIQUE: Routine CT Head without IV contrast. Multiplanar reformats. Dose reduction techniques were used.    FINDINGS:  INTRACRANIAL CONTENTS: No intracranial hemorrhage, extraaxial collection, or mass effect.  No CT evidence of acute infarct. Normal parenchymal attenuation. Normal ventricles and sulci.     VISUALIZED ORBITS/SINUSES/MASTOIDS: No intraorbital abnormality. Left maxillary sinus mucosal retention cyst versus polyp.  No middle ear or mastoid effusion.    BONES/SOFT TISSUES: No acute abnormality.      Impression    IMPRESSION:  1.  No acute intracranial process.

## 2025-06-25 NOTE — TELEPHONE ENCOUNTER
DATE/TIME OF CALL RECEIVED FROM LAB:  06/25/25 at 1:25 PM   LAB TEST:  hgb  LAB VALUE:  6.6  PROVIDER NOTIFIED?: Yes  PROVIDER NAME: Dr. Harris   DATE/TIME LAB VALUE REPORTED TO PROVIDER: 1:25 PM   MECHANISM OF PROVIDER NOTIFICATION: phone note  PROVIDER RESPONSE: 1 unit irradiated Prbc    Will arrange for Prbc transfusion - do you want 1 or 2 units  Lisha Winkler RN...........6/25/2025 1:26 PM

## 2025-06-25 NOTE — PROGRESS NOTES
Admission Note    Data:  Alejandra Villegas admitted to [facility] from emergency room at 1830.      Action:  Dr. Welsh has been notified of admission. Pt oriented to unit, call light in reach.     Response:  Patient tolerated transfer to [facility].

## 2025-06-26 ENCOUNTER — PATIENT OUTREACH (OUTPATIENT)
Dept: ONCOLOGY | Facility: CLINIC | Age: 60
End: 2025-06-26
Payer: MEDICARE

## 2025-06-26 ENCOUNTER — PATIENT OUTREACH (OUTPATIENT)
Dept: FAMILY MEDICINE | Facility: OTHER | Age: 60
End: 2025-06-26
Payer: MEDICARE

## 2025-06-26 VITALS
BODY MASS INDEX: 28.8 KG/M2 | DIASTOLIC BLOOD PRESSURE: 71 MMHG | HEIGHT: 66 IN | WEIGHT: 179.2 LBS | SYSTOLIC BLOOD PRESSURE: 165 MMHG | HEART RATE: 96 BPM | RESPIRATION RATE: 20 BRPM | TEMPERATURE: 99.5 F | OXYGEN SATURATION: 90 %

## 2025-06-26 DIAGNOSIS — C92.00 ACUTE MYELOID LEUKEMIA NOT HAVING ACHIEVED REMISSION (H): Primary | ICD-10-CM

## 2025-06-26 LAB
ALBUMIN SERPL BCG-MCNC: 3.6 G/DL (ref 3.5–5.2)
ALP SERPL-CCNC: 196 U/L (ref 40–150)
ALT SERPL W P-5'-P-CCNC: 17 U/L (ref 0–50)
ANION GAP SERPL CALCULATED.3IONS-SCNC: 10 MMOL/L (ref 7–15)
AST SERPL W P-5'-P-CCNC: 27 U/L (ref 0–45)
ATRIAL RATE - MUSE: 106 BPM
BACTERIA SPEC CULT: NORMAL
BACTERIA SPEC CULT: NORMAL
BASOPHILS # BLD AUTO: 0 10E3/UL (ref 0–0.2)
BASOPHILS NFR BLD AUTO: 0 %
BILIRUB SERPL-MCNC: 0.6 MG/DL
BUN SERPL-MCNC: 7 MG/DL (ref 6–20)
CALCIUM SERPL-MCNC: 9 MG/DL (ref 8.8–10.4)
CHLORIDE SERPL-SCNC: 107 MMOL/L (ref 98–107)
CREAT SERPL-MCNC: 0.59 MG/DL (ref 0.51–0.95)
DIASTOLIC BLOOD PRESSURE - MUSE: NORMAL MMHG
EGFRCR SERPLBLD CKD-EPI 2021: >90 ML/MIN/1.73M2
EOSINOPHIL # BLD AUTO: 0.1 10E3/UL (ref 0–0.7)
EOSINOPHIL NFR BLD AUTO: 1 %
ERYTHROCYTE [DISTWIDTH] IN BLOOD BY AUTOMATED COUNT: 19.2 % (ref 10–15)
GLUCOSE SERPL-MCNC: 116 MG/DL (ref 70–99)
HCO3 SERPL-SCNC: 24 MMOL/L (ref 22–29)
HCT VFR BLD AUTO: 25.5 % (ref 35–47)
HGB BLD-MCNC: 8.4 G/DL (ref 11.7–15.7)
HOLD SPECIMEN: NORMAL
IMM GRANULOCYTES # BLD: 0.4 10E3/UL
IMM GRANULOCYTES NFR BLD: 5 %
INTERPRETATION ECG - MUSE: NORMAL
LYMPHOCYTES # BLD AUTO: 0.6 10E3/UL (ref 0.8–5.3)
LYMPHOCYTES NFR BLD AUTO: 8 %
MAGNESIUM SERPL-MCNC: 2.2 MG/DL (ref 1.7–2.3)
MAGNESIUM SERPL-MCNC: 2.5 MG/DL (ref 1.7–2.3)
MCH RBC QN AUTO: 30.5 PG (ref 26.5–33)
MCHC RBC AUTO-ENTMCNC: 32.9 G/DL (ref 31.5–36.5)
MCV RBC AUTO: 93 FL (ref 78–100)
MONOCYTES # BLD AUTO: 1.2 10E3/UL (ref 0–1.3)
MONOCYTES NFR BLD AUTO: 14 %
NEUTROPHILS # BLD AUTO: 6.2 10E3/UL (ref 1.6–8.3)
NEUTROPHILS NFR BLD AUTO: 73 %
NRBC # BLD AUTO: 0 10E3/UL
NRBC BLD AUTO-RTO: 0 /100
P AXIS - MUSE: 71 DEGREES
PLATELET # BLD AUTO: 40 10E3/UL (ref 150–450)
POTASSIUM SERPL-SCNC: 3.3 MMOL/L (ref 3.4–5.3)
POTASSIUM SERPL-SCNC: 3.7 MMOL/L (ref 3.4–5.3)
PR INTERVAL - MUSE: 126 MS
PROT SERPL-MCNC: 6.7 G/DL (ref 6.4–8.3)
QRS DURATION - MUSE: 80 MS
QT - MUSE: 350 MS
QTC - MUSE: 464 MS
R AXIS - MUSE: -6 DEGREES
RBC # BLD AUTO: 2.75 10E6/UL (ref 3.8–5.2)
SODIUM SERPL-SCNC: 141 MMOL/L (ref 135–145)
SYSTOLIC BLOOD PRESSURE - MUSE: NORMAL MMHG
T AXIS - MUSE: 31 DEGREES
VENTRICULAR RATE- MUSE: 106 BPM
WBC # BLD AUTO: 8.6 10E3/UL (ref 4–11)

## 2025-06-26 PROCEDURE — 93005 ELECTROCARDIOGRAM TRACING: CPT

## 2025-06-26 PROCEDURE — 99239 HOSP IP/OBS DSCHRG MGMT >30: CPT | Performed by: FAMILY MEDICINE

## 2025-06-26 PROCEDURE — 250N000011 HC RX IP 250 OP 636: Performed by: FAMILY MEDICINE

## 2025-06-26 PROCEDURE — 250N000013 HC RX MED GY IP 250 OP 250 PS 637: Performed by: FAMILY MEDICINE

## 2025-06-26 PROCEDURE — 83735 ASSAY OF MAGNESIUM: CPT | Performed by: FAMILY MEDICINE

## 2025-06-26 PROCEDURE — 999N000157 HC STATISTIC RCP TIME EA 10 MIN

## 2025-06-26 PROCEDURE — 250N000009 HC RX 250: Performed by: FAMILY MEDICINE

## 2025-06-26 PROCEDURE — 84155 ASSAY OF PROTEIN SERUM: CPT | Performed by: FAMILY MEDICINE

## 2025-06-26 PROCEDURE — 85004 AUTOMATED DIFF WBC COUNT: CPT | Performed by: FAMILY MEDICINE

## 2025-06-26 PROCEDURE — 93010 ELECTROCARDIOGRAM REPORT: CPT | Performed by: INTERNAL MEDICINE

## 2025-06-26 PROCEDURE — 36415 COLL VENOUS BLD VENIPUNCTURE: CPT | Performed by: FAMILY MEDICINE

## 2025-06-26 PROCEDURE — 94640 AIRWAY INHALATION TREATMENT: CPT | Mod: 76

## 2025-06-26 PROCEDURE — 84132 ASSAY OF SERUM POTASSIUM: CPT | Performed by: FAMILY MEDICINE

## 2025-06-26 RX ORDER — CEFEPIME HYDROCHLORIDE 2 G/1
2 INJECTION, POWDER, FOR SOLUTION INTRAVENOUS EVERY 8 HOURS
Status: DISCONTINUED | OUTPATIENT
Start: 2025-06-26 | End: 2025-06-26 | Stop reason: HOSPADM

## 2025-06-26 RX ORDER — POTASSIUM CHLORIDE 1500 MG/1
40 TABLET, EXTENDED RELEASE ORAL ONCE
Status: COMPLETED | OUTPATIENT
Start: 2025-06-26 | End: 2025-06-26

## 2025-06-26 RX ORDER — LEVOFLOXACIN 750 MG/1
750 TABLET, FILM COATED ORAL DAILY
Qty: 5 TABLET | Refills: 0 | Status: SHIPPED | OUTPATIENT
Start: 2025-06-26 | End: 2025-07-01

## 2025-06-26 RX ORDER — LORAZEPAM 0.5 MG/1
0.5 TABLET ORAL EVERY 4 HOURS PRN
Status: DISCONTINUED | OUTPATIENT
Start: 2025-06-26 | End: 2025-06-26 | Stop reason: HOSPADM

## 2025-06-26 RX ADMIN — ACYCLOVIR 400 MG: 200 CAPSULE ORAL at 09:24

## 2025-06-26 RX ADMIN — FAMOTIDINE 20 MG: 20 TABLET, FILM COATED ORAL at 09:24

## 2025-06-26 RX ADMIN — SUCRALFATE ORAL SUSPENSION 1 G: 1 SUSPENSION ORAL at 07:52

## 2025-06-26 RX ADMIN — Medication 1250 MG: at 10:26

## 2025-06-26 RX ADMIN — BUDESONIDE 0.5 MG: 0.5 INHALANT RESPIRATORY (INHALATION) at 06:00

## 2025-06-26 RX ADMIN — CEFEPIME 2 G: 2 INJECTION, POWDER, FOR SOLUTION INTRAVENOUS at 09:25

## 2025-06-26 RX ADMIN — PAROXETINE HYDROCHLORIDE 20 MG: 20 TABLET, FILM COATED ORAL at 09:24

## 2025-06-26 RX ADMIN — POTASSIUM CHLORIDE 40 MEQ: 1500 TABLET, EXTENDED RELEASE ORAL at 07:52

## 2025-06-26 RX ADMIN — ACETAMINOPHEN 650 MG: 325 TABLET ORAL at 09:40

## 2025-06-26 RX ADMIN — ACETAMINOPHEN 650 MG: 325 TABLET ORAL at 02:13

## 2025-06-26 RX ADMIN — SUCRALFATE ORAL SUSPENSION 1 G: 1 SUSPENSION ORAL at 11:30

## 2025-06-26 RX ADMIN — GABAPENTIN 400 MG: 400 CAPSULE ORAL at 13:46

## 2025-06-26 RX ADMIN — IPRATROPIUM BROMIDE AND ALBUTEROL SULFATE 3 ML: .5; 3 SOLUTION RESPIRATORY (INHALATION) at 06:00

## 2025-06-26 RX ADMIN — HYDROXYZINE HYDROCHLORIDE 25 MG: 25 TABLET, FILM COATED ORAL at 09:24

## 2025-06-26 RX ADMIN — PANTOPRAZOLE SODIUM 40 MG: 40 TABLET, DELAYED RELEASE ORAL at 09:24

## 2025-06-26 RX ADMIN — POTASSIUM CHLORIDE 20 MEQ: 750 TABLET, EXTENDED RELEASE ORAL at 09:40

## 2025-06-26 RX ADMIN — GABAPENTIN 400 MG: 400 CAPSULE ORAL at 06:08

## 2025-06-26 RX ADMIN — IPRATROPIUM BROMIDE AND ALBUTEROL SULFATE 3 ML: .5; 3 SOLUTION RESPIRATORY (INHALATION) at 14:34

## 2025-06-26 ASSESSMENT — ACTIVITIES OF DAILY LIVING (ADL)
ADLS_ACUITY_SCORE: 40

## 2025-06-26 NOTE — PROGRESS NOTES
SAFETY CHECKLIST  ID Bands and Risk clasps correct and in place (DNR, Fall risk, Allergy, Latex, Limb):  Yes  All Lines Reconciled and labeled correctly: Yes  Whiteboard updated:Yes  Environmental interventions: Yes  Verify Tele #: N/A    Vida Vivas RN .......  6/26/2025  7:31 AM

## 2025-06-26 NOTE — PLAN OF CARE
Goal Outcome Evaluation:  Pt is A&O X4. Slept majority of this shift. Highest temp of 99.5  Lungs are diminished to the bases. Non-productive cough present.   +1 edema to ankles and feet. Skin pale.   Received blood transfusion overnight for low hgb. Awaiting for redraw this AM.   2 IVs saline locked.  Received PRN Tylenol 2X for headache.   Up SBA.   Jyoti Watson RN on 6/26/2025 at 5:14 AM      Plan of Care Reviewed With: patient    Overall Patient Progress: improving

## 2025-06-26 NOTE — PROGRESS NOTES
[facility] DISCHARGE NOTE    Patient discharged to home at 2:59 PM via wheel chair. Accompanied by sister and staff. Discharge instructions reviewed with patient, opportunity offered to ask questions. Prescriptions sent to patients preferred pharmacy. All belongings sent with patient.    Vida Vivas RN

## 2025-06-26 NOTE — TELEPHONE ENCOUNTER
Patient is still currently admitted. TCM is not indicated at this time.    Samanta Davila RN on 6/26/2025 at 1:20 PM

## 2025-06-26 NOTE — PROGRESS NOTES
Hemoglobin 6.4. Blood infusion started.  Jyoti Watson RN on 6/25/2025 at 10:51 PM    Blood completed. Pt tolerated well.  Dr. Amanda contacted regarding when to get a new hemoglobin checked. Dr. Amanda OK to wait to check hemoglobin until morning labs.  Jyoti Watson RN on 6/26/2025 at 2:20 AM

## 2025-06-26 NOTE — DISCHARGE SUMMARY
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    Weight as of this encounter: 81.3 kg (179 lb 3.2 oz).       Follow-ups Needed After Discharge   Follow-up Appointments       Follow-up and recommended labs and tests       Keep scheduled follow up with oncology                Unresulted Labs Ordered in the Past 30 Days of this Admission       Date and Time Order Name Status Description    6/25/2025  4:10 PM Prepare red blood cells (unit) Preliminary     6/25/2025  3:55 PM Blood Culture Peripheral blood (BC) Arm, Right Preliminary     6/25/2025  3:55 PM Blood Culture Peripheral blood (BC) Arm, Left Preliminary         These results will be followed up by hospitalist pool    Discharge Disposition   Discharged to home  Condition at discharge: Stable    Hospital Course   Alejandra Villegas is a 59 year old female admitted on 6/25/2025. She is currently undergoing treatment for AML. She has a history of pancytopenia, HTN, pulmonary emphysema, and CKD stage 2. She presented for a RBC infusion and on arrival she was febrile at 102.5 with altered mental status.      Primary problem    Sepsis with encephalopathy without septic shock  Sepsis protocol initiated  Altered mental status and fever of unknown origin, " possible pneumonia   Tmax of 102.5, low platelets at 49, low hemoglobin at 6.0, WBC normal at 9.3  Treated with Vancomycin, Cefepime   Blood cultures pending, prelim results show no growth  She remained afebrile and without symptoms. Was very antsy and wanted to leave. Has left AMA in the past. Agreed to stay until 24 hrs after blood cultures drawn and are NGTD  Discharge on Levaquin 750 mg daily x 5 days  Return if worse or will return if blood cultures positive  Has close follow up with oncology labs       Pancytopenia  WBC normal at 9.3, Platelets low at 49, RBC low at 1.87, hemoglobin low at 6.0  Platelets were 2 two days ago and received transfusion  Recent Neulasta, no longer neutropenic  Hemoglobin trending up at 8.4, platelets low at 40  RBC transfusion completed in ED, received a second RBC transfusion last evening  Has follow up labs through oncology to ensure stability in hemoglobin and platelets       AML  Completed cycle 2 of HiDAC on 5/9/25  Continue Acyclovir and posaconazole       Emphysema  Budesonide and Duoneb scheduled to replace home inhalers  Resume usual inhalers on discharge       HTN  Held lisinopril and resume at discharge       CKD stage 2  Stable   BUN 7.0, Creatinine 0.59       Hypomagnesemia  Admit 1.5, replace  Stable at 2.2       Tobacco Use  Patient anxious this morning, asking to smoke a cigarette  Offered multiple nicotine replacement option, patient declined   Hydroxyzine for anxiety    Consultations This Hospital Stay   PHARMACY TO DOSE VANCO  PHARMACY TO DOSE VANCO    Code Status   Full Code    Time Spent on this Encounter   I, Dipak Welsh MD, personally saw the patient today and spent greater than 30 minutes discharging this patient.       Dipak Welsh MD  [facility]  [address]  [place] [address]  Phone: [phone]  Fax: [phone]  ______________________________________________________________________    Physical Exam    Vital Signs: Temp: 99.5  F (37.5  C) Temp src: Tympanic BP: (!) 165/71 Pulse: 96   Resp: 20 SpO2: (!) 90 % O2 Device: None (Room air)    Weight: 179 lbs 3.2 oz  General Appearance: Alert. No acute distress  Chest/Respiratory Exam: Clear to auscultation bilaterally  Cardiovascular Exam: Regular rate and rhythm. S1, S2, no murmur, gallop, or rubs.  Gastrointestinal Exam: Soft, nontender  Extremities: No lower extremity edema.  Psychiatric: Normal affect and mentation       Primary Care Physician   Bon Lezama    Discharge Orders      Reason for your hospital stay    Fever, concern for serious infection.     Follow-up and recommended labs and tests     Keep scheduled follow up with oncology     Activity    Your activity upon discharge: activity as tolerated     Diet    Follow this diet upon discharge: Combination Diet Regular Diet Adult       Significant Results and Procedures   Most Recent 3 CBC's:  Recent Labs   Lab Test 06/26/25  0618 06/25/25  2055 06/25/25  1603 06/25/25  1258   WBC 8.6  --  9.3 7.2   HGB 8.4* 6.4* 6.0* 6.6*   MCV 93 94 95 98   PLT 40*  --  49* 54*     Most Recent 3 BMP's:  Recent Labs   Lab Test 06/26/25  1225 06/26/25  0618 06/25/25  1603 06/25/25  1258   NA  --  141 139 142   POTASSIUM 3.7 3.3* 3.0* 3.1*   CHLORIDE  --  107 98 101   CO2  --  24 23 27   BUN  --  7.0 11.7 9.1   CR  --  0.59 0.67 0.64   ANIONGAP  --  10 18* 14   TOBIN  --  9.0 9.5 9.7   GLC  --  116* 121* 115*     Most Recent 2 LFT's:  Recent Labs   Lab Test 06/26/25  0618 06/25/25  1603   AST 27 25   ALT 17 14   ALKPHOS 196* 174*   BILITOTAL 0.6 0.4   ,   Results for orders placed or performed during the hospital encounter of 06/25/25   XR Chest Port 1 View    Narrative    PROCEDURE:  XR CHEST PORT 1 VIEW    HISTORY:  Fever.     COMPARISON:  None.    FINDINGS:   The cardiac silhouette is normal in size. The pulmonary vasculature is  normal.  There is interstitial thickening seen in both lungs. This  represents a change  from 5/18/2025. No pleural effusion or  pneumothorax.      Impression    IMPRESSION:  Interstitial thickening which is new as compared to  5/18/2025      ISABELLA SANTANA MD         SYSTEM ID:  [id number]   CT Head w/o Contrast    Narrative    EXAM: CT HEAD W/O CONTRAST  LOCATION: [facility]  DATE: 6/25/2025    INDICATION: acute mental status change  COMPARISON: None.  TECHNIQUE: Routine CT Head without IV contrast. Multiplanar reformats. Dose reduction techniques were used.    FINDINGS:  INTRACRANIAL CONTENTS: No intracranial hemorrhage, extraaxial collection, or mass effect.  No CT evidence of acute infarct. Normal parenchymal attenuation. Normal ventricles and sulci.     VISUALIZED ORBITS/SINUSES/MASTOIDS: No intraorbital abnormality. Left maxillary sinus mucosal retention cyst versus polyp.  No middle ear or mastoid effusion.    BONES/SOFT TISSUES: No acute abnormality.      Impression    IMPRESSION:  1.  No acute intracranial process.     *Note: Due to a large number of results and/or encounters for the requested time period, some results have not been displayed. A complete set of results can be found in Results Review.       Discharge Medications      Review of your medicines        START taking        Dose / Directions   levofloxacin 750 MG tablet  Commonly known as: LEVAQUIN      Dose: 750 mg  Take 1 tablet (750 mg) by mouth daily for 5 days.  Quantity: 5 tablet  Refills: 0            CONTINUE these medicines which have NOT CHANGED        Dose / Directions   acetaminophen 325 MG tablet  Commonly known as: TYLENOL  Used for: Acute myeloid leukemia in remission (H)      Dose: 650 mg  Take 2 tablets (650 mg) by mouth every 4 hours as needed for mild pain.  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  Quantity: 18 g  Refills: 0     budesonide-formoterol 80-4.5 MCG/ACT inhaler  Commonly known as: SYMBICORT/BREYNA  Used for: COPD exacerbation (H), Pulmonary emphysema, unspecified emphysema type (H)      Dose: 2 puff  Inhale 2 puffs into the lungs 2 times daily - Rinse mouth well after use to prevent Thrush  Quantity: 10.2 g  Refills: 11     calcium carbonate 500 MG chewable tablet  Commonly known as: TUMS  Used for: Gastroesophageal reflux disease, unspecified whether esophagitis present      Dose: 1 chew tab  Take 1 tablet (500 mg) by mouth 3 times daily as needed for heartburn.  Quantity: 90 tablet  Refills: 0     cyanocobalamin 500 MCG tablet  Commonly known as: VITAMIN B-12      Dose: 500 mcg  Take 500 mcg by mouth daily.  Refills: 0     cyclobenzaprine 10 MG tablet  Commonly known as: FLEXERIL  Used for: Muscle spasm      Dose: 10 mg  Take 1 tablet (10 mg) by mouth 3 times daily as needed for muscle spasms.  Quantity: 15 tablet  Refills: 0     famotidine 20 MG tablet  Commonly known as: PEPCID  Used for: Gastroesophageal reflux disease, unspecified whether esophagitis present      Dose: 20 mg  Take 1 tablet (20 mg) by mouth 2 times daily.  Quantity: 60 tablet  Refills: 0     fluticasone 50 MCG/ACT nasal spray  Commonly known as: FLONASE  Used for: Dysfunction  of both eustachian tubes, Rhinitis, unspecified type      Dose: 2 spray  Spray 2 sprays into both nostrils 2 times daily.  Quantity: 15.8 mL  Refills: 0     gabapentin 400 MG capsule  Commonly known as: NEURONTIN  Used for: Acute myeloid leukemia in remission (H)      Dose: 400 mg  Take 1 capsule (400 mg) by mouth 3 times daily.  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  Quantity: 90 tablet  Refills: 3     lisinopril 10 MG tablet  Commonly known as: ZESTRIL  Used for: Acute myeloid leukemia in remission (H)      Dose: 10 mg  Take 1 tablet (10 mg) by mouth daily.  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  Quantity: 90 tablet  Refills: 3     ondansetron 4 MG ODT tab  Commonly known as: ZOFRAN ODT  Used for: Nausea and vomiting, unspecified vomiting type      Dose: 4-8 mg  Take 1-2 tablets (4-8 mg) by mouth every 8 hours as needed for nausea or vomiting.  Quantity: 45 tablet  Refills: 3     order for DME  Used for: Bronchitis, asthmatic, mild persistent, with acute exacerbation      Equipment being ordered: home neb set up with mask and tubing  Quantity: 1 Device  Refills: 0     pantoprazole 40 MG EC tablet  Commonly known as: PROTONIX  Used for: Acute myeloid leukemia in remission (H)      Dose: 40 mg  Take 1 tablet (40 mg) by mouth 2 times daily (before meals).  Quantity: 180 tablet  Refills: 3     PARoxetine 20 MG tablet  Commonly known as: PAXIL  Used for: Chronic anxiety      Dose: 20 mg  Take 1 tablet (20 mg) by mouth every morning.  Quantity: 90 tablet  Refills: 3     posaconazole 100 MG DR  tablet  Commonly known as: NOXAFIL  Used for: Acute myeloid leukemia in remission (H)      Dose: 300 mg  Take 3 tablets (300 mg) by mouth every morning.  Quantity: 90 tablet  Refills: 0     potassium chloride ER 20 MEQ CR tablet  Commonly known as: K-TAB  Used for: Hypokalemia      Dose: 20 mEq  Take 1 tablet (20 mEq) by mouth daily.  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  Quantity: 30 tablet  Refills: 3     rosuvastatin 20 MG tablet  Commonly known as: CRESTOR  Used for: Mixed hyperlipidemia      Dose: 20 mg  Take 1 tablet (20 mg) by mouth daily.  Quantity: 90 tablet  Refills: 3     sucralfate 1 GM/10ML suspension  Commonly known as: CARAFATE  Used for: Acute myeloid leukemia in remission (H)      Dose: 1 g  Take 10 mLs (1 g) by mouth 4 times daily (before meals and nightly).  Refills: 0     SUMAtriptan 50 MG tablet  Commonly known as: IMITREX  Used for: Other migraine without status migrainosus, not intractable      Dose: 50 mg  Take 1 tablet (50 mg) by mouth at onset of headache for migraine.  Quantity: 30 tablet  Refills: 0     Vitamin D3 25 mcg (1000 units) tablet  Commonly known as: CHOLECALCIFEROL  Used for: Vitamin D deficiency      Dose: 25 mcg  Take 1 tablet (25 mcg) by mouth daily.  Quantity: 90 tablet  Refills: 3           * This list has 2 medication(s) that are the same as other medications prescribed for you. Read the directions carefully, and ask your doctor or other care provider to review them with you.                STOP taking      cefpodoxime 200 MG tablet  Commonly known as: VANTIN                  Where to get your medicines        These medications were sent to [id number] - [place], [place] - [address]  [address], [place]  [address]      Phone: [phone]   levofloxacin 750 MG tablet       Allergies   Allergies   Allergen Reactions    Bee Pollen Swelling     Seasonal    Adhesive Tape Rash    Amoxicillin Rash    Chlorhexidine Rash     After several weeks, started to develop rash on R neck down to chest and arm. Also noted on back of knees. Providers suggesting related to CHG as nothing else is new for pt.     Folic Acid Itching    Liquid Adhesive Rash    Meloxicam Rash    Nabumetone GI Disturbance     GI upset    Pollen Extract      Seasonal

## 2025-06-26 NOTE — PHARMACY-VANCOMYCIN DOSING SERVICE
Pharmacy Vancomycin Note  Date of Service 2025  Patient's  1965   59 year old, female    Indication: Sepsis  Day of Therapy: 2  Current vancomycin regimen:  2000 mg ONCE IV   Current vancomycin monitoring method: AUC  Current vancomycin therapeutic monitoring goal: 400-600 mg*h/L    InsightRX Prediction of Current Vancomycin Regimen  Loading dose: 2000 mg at 16:30 2025.  Regimen: 1000 mg IV every 12 hours.  Start time: 04:30 on 2025  Exposure target: AUC24 (range) 400-600 mg/L.hr   AUC24,ss: 438 mg/L.hr  Probability of AUC24 > 400: 59 %  Ctrough,ss: 13.2 mg/L  Probability of Ctrough,ss > 20: 20 %  Probability of nephrotoxicity (Lodise VANESSA ): 8 %    Current estimated CrCl = Estimated Creatinine Clearance: 113.1 mL/min (based on SCr of 0.59 mg/dL).    Creatinine for last 3 days  2025: 12:45 PM Creatinine 0.61 mg/dL  2025: 12:58 PM Creatinine 0.64 mg/dL;  4:03 PM Creatinine 0.67 mg/dL  2025:  6:18 AM Creatinine 0.59 mg/dL    Recent Vancomycin Levels (past 3 days)  No results found for requested labs within last 3 days.    Vancomycin IV Administrations (past 72 hours)                     vancomycin (VANCOCIN) 2,000 mg in 0.9% NaCl 500 mL intermittent infusion (mg) 2,000 mg New Bag 25 1640                    Nephrotoxins and other renal medications (From now, onward)      Start     Dose/Rate Route Frequency Ordered Stop    25 1000  [Held by provider]  lisinopril (ZESTRIL) tablet 10 mg        (On hold since yesterday at 1908 until manually unheld; held by Dipak Welsh MDHold Reason: Change in Vitals)   Note to Pharmacy: PTA Sig:Take 1 tablet (10 mg) by mouth daily.      10 mg Oral DAILY 25 1908      25 0930  vancomycin (VANCOCIN) 1,250 mg in 0.9% NaCl 250 mL intermittent infusion         1,250 mg  over 90 Minutes Intravenous EVERY 12 HOURS 25 0901      25 2200  acyclovir (ZOVIRAX) capsule 400 mg        Note to Pharmacy: PTA Sig:Take  1 tablet (400 mg) by mouth 2 times daily.      400 mg Oral 2 TIMES DAILY 06/25/25 1908                 Contrast Orders - past 72 hours (72h ago, onward)      None            Interpretation of levels and current regimen:  Vancomycin level is reflective of -600    Has serum creatinine changed greater than 50% in last 72 hours: No    Urine output:  good urine output    Renal Function: Stable    InsightRX Prediction of Planned New Vancomycin Regimen  Loading dose: 2000mg IV ONCE   Regimen: 1250 mg IV every 12 hours.  Start time: 08:55 on 06/26/2025  Exposure target: AUC24 (range) 400-600 mg/L.hr   AUC24,ss: 504 mg/L.hr  Probability of AUC24 > 400: 73 %  Ctrough,ss: 15 mg/L  Probability of Ctrough,ss > 20: 29 %  Probability of nephrotoxicity (Lodise VANESSA 2009): 10 %      Plan:  Increase to 1250mg IV q12hr  Vancomycin monitoring method: AUC  Vancomycin therapeutic monitoring goal: 400-600 mg*h/L  Pharmacy will check vancomycin levels as appropriate in 1-3 Days.   Serum creatinine levels will be ordered daily for the first week of therapy and at least twice weekly for subsequent weeks.    [facility], [facility]

## 2025-06-26 NOTE — PLAN OF CARE
Alert and oriented x 4. VSS, afebrile. Denies pain. LS clear/diminished - on room air. SOB. Frequent, productive cough. Receiving IV antibiotics as ordered. Bilateral lower extremity trace edema. Ambulating with stand by assist with cane. Plan for patient to discharge home today.     Vida Vivas RN .......  6/26/2025  2:51 PM      Goal Outcome Evaluation:      Plan of Care Reviewed With: patient    Overall Patient Progress: improving    Outcome Evaluation: VSS, afebrile, denies pain, receiving IV antibiotics as ordered

## 2025-06-26 NOTE — PROGRESS NOTES
Interdisciplinary Discharge Planning Note    Anticipated Discharge Date:6/26-6/27    Anticipated Discharge Location: Home    Clinical Needs Before Discharge:  Pending blood cultures, hemoglobin checks,     Treatment Needs After Discharge:  None identified    Potential Barriers to Discharge: None Identified     KARMEN Flannery  6/26/2025,  12:59 PM

## 2025-06-26 NOTE — PHARMACY - DISCHARGE MEDICATION RECONCILIATION AND EDUCATION
Pharmacy:  Discharge Counseling and Medication Reconciliation    Alejandra Villegas  [address]  [place] [address]  [phone] (home)   59 year old female  PCP: Bon Lezama    Allergies: Bee pollen, Adhesive tape, Amoxicillin, Chlorhexidine, Folic acid, Liquid adhesive, Meloxicam, Nabumetone, and Pollen extract    Discharge Counseling:    Pharmacist met with patient (and/or family) today to review the medication portion of the After Visit Summary (with an emphasis on NEW medications) and to address patient's questions/concerns.    Summary of Education: Discussed the appropriate use of oral antibiotics with patient. Such as the importance of finishing the entire course and how to manage potential side effects such as diarrhea with OTC medications and to be on the lookout of intense unusual muscle pain. Patient is aware to space medication from pills containing calcium, iron, mag, zinc d/t decreased absorption and that sun sensitivity may occur. Patient educated that they may take with/without food but should avoid dairy products near administration. Dicussed possible QT prolongation and to report signs of changes to heart beat.       Materials Provided:  MedCounselor sheets printed from Clinical Pharmacology on: Levofloxacin     Discharge Medication Reconciliation:    It has been determined that the patient has an adequate supply of medications available or which can be obtained from the patient's preferred pharmacy, which HE/SHE has confirmed as:      Thank you for the consult.    Amandeep Dean [facility]........June 26, 2025 3:14 PM

## 2025-06-27 ENCOUNTER — RESULTS FOLLOW-UP (OUTPATIENT)
Dept: ONCOLOGY | Facility: OTHER | Age: 60
End: 2025-06-27

## 2025-06-27 ENCOUNTER — LAB (OUTPATIENT)
Dept: LAB | Facility: OTHER | Age: 60
End: 2025-06-27
Payer: MEDICARE

## 2025-06-27 DIAGNOSIS — C92.00 ACUTE MYELOID LEUKEMIA NOT HAVING ACHIEVED REMISSION (H): ICD-10-CM

## 2025-06-27 LAB
ALBUMIN SERPL BCG-MCNC: 3.8 G/DL (ref 3.5–5.2)
ALP SERPL-CCNC: 205 U/L (ref 40–150)
ALT SERPL W P-5'-P-CCNC: 16 U/L (ref 0–50)
ANION GAP SERPL CALCULATED.3IONS-SCNC: 13 MMOL/L (ref 7–15)
AST SERPL W P-5'-P-CCNC: 23 U/L (ref 0–45)
ATRIAL RATE - MUSE: 106 BPM
BASOPHILS # BLD MANUAL: 0 10E3/UL (ref 0–0.2)
BASOPHILS NFR BLD MANUAL: 0 %
BILIRUB SERPL-MCNC: 0.7 MG/DL
BUN SERPL-MCNC: 6.1 MG/DL (ref 6–20)
CALCIUM SERPL-MCNC: 9.4 MG/DL (ref 8.8–10.4)
CHLORIDE SERPL-SCNC: 104 MMOL/L (ref 98–107)
CREAT SERPL-MCNC: 0.58 MG/DL (ref 0.51–0.95)
DIASTOLIC BLOOD PRESSURE - MUSE: NORMAL MMHG
EGFRCR SERPLBLD CKD-EPI 2021: >90 ML/MIN/1.73M2
EOSINOPHIL # BLD MANUAL: 0.1 10E3/UL (ref 0–0.7)
EOSINOPHIL NFR BLD MANUAL: 2 %
ERYTHROCYTE [DISTWIDTH] IN BLOOD BY AUTOMATED COUNT: 18.6 % (ref 10–15)
GLUCOSE SERPL-MCNC: 155 MG/DL (ref 70–99)
HCO3 SERPL-SCNC: 23 MMOL/L (ref 22–29)
HCT VFR BLD AUTO: 26.3 % (ref 35–47)
HGB BLD-MCNC: 8.8 G/DL (ref 11.7–15.7)
INTERPRETATION ECG - MUSE: NORMAL
LYMPHOCYTES # BLD MANUAL: 0.8 10E3/UL (ref 0.8–5.3)
LYMPHOCYTES NFR BLD MANUAL: 11 %
MCH RBC QN AUTO: 30.8 PG (ref 26.5–33)
MCHC RBC AUTO-ENTMCNC: 33.5 G/DL (ref 31.5–36.5)
MCV RBC AUTO: 92 FL (ref 78–100)
METAMYELOCYTES # BLD MANUAL: 0.1 10E3/UL
METAMYELOCYTES NFR BLD MANUAL: 1 %
MONOCYTES # BLD MANUAL: 1.4 10E3/UL (ref 0–1.3)
MONOCYTES NFR BLD MANUAL: 20 %
MYELOCYTES # BLD MANUAL: 0.3 10E3/UL
MYELOCYTES NFR BLD MANUAL: 4 %
NEUTROPHILS # BLD MANUAL: 4.5 10E3/UL (ref 1.6–8.3)
NEUTROPHILS NFR BLD MANUAL: 62 %
P AXIS - MUSE: 71 DEGREES
PLAT MORPH BLD: ABNORMAL
PLATELET # BLD AUTO: 53 10E3/UL (ref 150–450)
POTASSIUM SERPL-SCNC: 3.2 MMOL/L (ref 3.4–5.3)
PR INTERVAL - MUSE: 126 MS
PROT SERPL-MCNC: 7.4 G/DL (ref 6.4–8.3)
QRS DURATION - MUSE: 80 MS
QT - MUSE: 350 MS
QTC - MUSE: 464 MS
R AXIS - MUSE: -6 DEGREES
RBC # BLD AUTO: 2.86 10E6/UL (ref 3.8–5.2)
RBC MORPH BLD: ABNORMAL
SODIUM SERPL-SCNC: 140 MMOL/L (ref 135–145)
SYSTOLIC BLOOD PRESSURE - MUSE: NORMAL MMHG
T AXIS - MUSE: 31 DEGREES
VARIANT LYMPHS BLD QL SMEAR: PRESENT
VENTRICULAR RATE- MUSE: 106 BPM
WBC # BLD AUTO: 7.2 10E3/UL (ref 4–11)

## 2025-06-27 PROCEDURE — 36415 COLL VENOUS BLD VENIPUNCTURE: CPT | Mod: ZL

## 2025-06-27 PROCEDURE — 85014 HEMATOCRIT: CPT | Mod: ZL

## 2025-06-27 PROCEDURE — 84155 ASSAY OF PROTEIN SERUM: CPT | Mod: ZL

## 2025-06-27 PROCEDURE — 85007 BL SMEAR W/DIFF WBC COUNT: CPT | Mod: ZL

## 2025-06-30 ENCOUNTER — LAB (OUTPATIENT)
Dept: LAB | Facility: OTHER | Age: 60
End: 2025-06-30
Payer: MEDICARE

## 2025-06-30 ENCOUNTER — PATIENT OUTREACH (OUTPATIENT)
Dept: CARE COORDINATION | Facility: CLINIC | Age: 60
End: 2025-06-30

## 2025-06-30 DIAGNOSIS — C92.00 ACUTE MYELOID LEUKEMIA NOT HAVING ACHIEVED REMISSION (H): ICD-10-CM

## 2025-06-30 LAB
ALBUMIN SERPL BCG-MCNC: 3.4 G/DL (ref 3.5–5.2)
ALP SERPL-CCNC: 143 U/L (ref 40–150)
ALT SERPL W P-5'-P-CCNC: 12 U/L (ref 0–50)
ANION GAP SERPL CALCULATED.3IONS-SCNC: 11 MMOL/L (ref 7–15)
AST SERPL W P-5'-P-CCNC: 13 U/L (ref 0–45)
BACTERIA SPEC CULT: NO GROWTH
BACTERIA SPEC CULT: NO GROWTH
BASOPHILS # BLD AUTO: 0 10E3/UL (ref 0–0.2)
BASOPHILS NFR BLD AUTO: 1 %
BILIRUB SERPL-MCNC: 0.3 MG/DL
BUN SERPL-MCNC: 8.4 MG/DL (ref 6–20)
CALCIUM SERPL-MCNC: 9.2 MG/DL (ref 8.8–10.4)
CHLORIDE SERPL-SCNC: 107 MMOL/L (ref 98–107)
CREAT SERPL-MCNC: 0.61 MG/DL (ref 0.51–0.95)
EGFRCR SERPLBLD CKD-EPI 2021: >90 ML/MIN/1.73M2
EOSINOPHIL # BLD AUTO: 0.1 10E3/UL (ref 0–0.7)
EOSINOPHIL NFR BLD AUTO: 1 %
ERYTHROCYTE [DISTWIDTH] IN BLOOD BY AUTOMATED COUNT: 17.8 % (ref 10–15)
GLUCOSE SERPL-MCNC: 113 MG/DL (ref 70–99)
HCO3 SERPL-SCNC: 24 MMOL/L (ref 22–29)
HCT VFR BLD AUTO: 25 % (ref 35–47)
HGB BLD-MCNC: 8.1 G/DL (ref 11.7–15.7)
IMM GRANULOCYTES # BLD: 0.1 10E3/UL
IMM GRANULOCYTES NFR BLD: 1 %
LYMPHOCYTES # BLD AUTO: 0.9 10E3/UL (ref 0.8–5.3)
LYMPHOCYTES NFR BLD AUTO: 14 %
MCH RBC QN AUTO: 30.7 PG (ref 26.5–33)
MCHC RBC AUTO-ENTMCNC: 32.4 G/DL (ref 31.5–36.5)
MCV RBC AUTO: 95 FL (ref 78–100)
MONOCYTES # BLD AUTO: 1.3 10E3/UL (ref 0–1.3)
MONOCYTES NFR BLD AUTO: 20 %
NEUTROPHILS # BLD AUTO: 4.3 10E3/UL (ref 1.6–8.3)
NEUTROPHILS NFR BLD AUTO: 64 %
NRBC # BLD AUTO: 0 10E3/UL
NRBC BLD AUTO-RTO: 1 /100
PLATELET # BLD AUTO: 109 10E3/UL (ref 150–450)
POTASSIUM SERPL-SCNC: 3.5 MMOL/L (ref 3.4–5.3)
PROT SERPL-MCNC: 6.8 G/DL (ref 6.4–8.3)
RBC # BLD AUTO: 2.64 10E6/UL (ref 3.8–5.2)
SODIUM SERPL-SCNC: 142 MMOL/L (ref 135–145)
WBC # BLD AUTO: 6.7 10E3/UL (ref 4–11)

## 2025-06-30 PROCEDURE — 36415 COLL VENOUS BLD VENIPUNCTURE: CPT | Mod: ZL

## 2025-06-30 PROCEDURE — 84155 ASSAY OF PROTEIN SERUM: CPT | Mod: ZL

## 2025-06-30 PROCEDURE — 80051 ELECTROLYTE PANEL: CPT | Mod: ZL

## 2025-06-30 PROCEDURE — 85014 HEMATOCRIT: CPT | Mod: ZL

## 2025-06-30 PROCEDURE — 85004 AUTOMATED DIFF WBC COUNT: CPT | Mod: ZL

## 2025-06-30 NOTE — PROGRESS NOTES
Social Work - Intervention  [facility]  Data/Intervention:    Patient Name: Alejandra Villegas Goes By: Alejandra       VERNELL/Age: 1965 (59 year old)     Visit Type: telephone and mychart   Referral Source: Nikki Bray  Reason for Referral: [facility]    Collaborated With:    -patient     Psychosocial Information/Concerns:  [facility] for upcoming visit     Intervention/Education/Resources Provided:  Declined hope lodge for now. Sent information for future, if needed.      Assessment/Plan:  No other concerns identified today.     Provided patient/family with contact information and availability.    CHRIS Finley, [facility]   Adult Oncology - [place]  [phone]  Onsite [place] on    *Please note does not work on .   Support Groups at [facility]: Social Work Services for Cancer Patients ([email or web address])

## 2025-07-02 ENCOUNTER — PATIENT OUTREACH (OUTPATIENT)
Dept: ONCOLOGY | Facility: OTHER | Age: 60
End: 2025-07-02
Payer: COMMERCIAL

## 2025-07-02 NOTE — PROGRESS NOTES
[facility]: Transitions of Care Note  Chief Complaint   Patient presents with    Clinic Care Coordination - [facility]       Most Recent Admission Date: 6/25/2025   Most Recent Admission Diagnosis: Sepsis with encephalopathy without septic shock (H) - A41.9, R65.20, G93.41  Sepsis with encephalopathy without septic shock, due to unspecified organism (H) - A41.9, R65.20, G93.41     Most Recent Discharge Date: 6/26/2025   Most Recent Discharge Diagnosis: Sepsis with encephalopathy without septic shock, due to unspecified organism (H) - A41.9, R65.20, G93.41  Unspecified septicemia(038.9) (H) - A41.9  Encephalopathy, unspecified type - G93.40     Transitions of Care Assessment    Discharge Assessment  How are your symptoms? (Red Flag symptoms escalate to triage hotline per guidelines): Improved  Do you know how to contact your clinic care team if you have future questions or changes to your health status? : Yes  Does the patient have their discharge instructions? : Yes  Does the patient have questions regarding their discharge instructions? : No  Were you started on any new medications or were there changes to any of your previous medications? : Yes (completed antibiotic)  Does the patient have all of their medications?: Yes  Do you have questions regarding any of your medications? : No  Do you have all of your needed medical supplies or equipment (DME)?  (i.e. oxygen tank, CPAP, cane, etc.): Yes             Follow up Plan     Discharge Follow-Up  Discharge follow up appointment scheduled in alignment with recommended follow up timeframe or Transitions of Risk Category? (Low = within 30 days; Moderate= within 14 days; High= within 7 days): No    Future Appointments   Date Time Provider Department [place]   7/3/2025  8:50 AM [facility] [place]   7/7/2025  8:20 AM [facility] [place]   7/9/2025  8[facility]30 [facility]   7/9/2025  9:00 AM Consuelo Huff APRN CNP [facility]        Outpatient Plan as outlined on AVS reviewed with patient.    For any urgent concerns, please contact our 24 hour clinic line:          Lisha Winkler RN

## 2025-07-03 ENCOUNTER — LAB (OUTPATIENT)
Dept: LAB | Facility: OTHER | Age: 60
End: 2025-07-03
Payer: MEDICARE

## 2025-07-03 DIAGNOSIS — C92.00 ACUTE MYELOID LEUKEMIA NOT HAVING ACHIEVED REMISSION (H): ICD-10-CM

## 2025-07-03 LAB
ALBUMIN SERPL BCG-MCNC: 3.7 G/DL (ref 3.5–5.2)
ALP SERPL-CCNC: 124 U/L (ref 40–150)
ALT SERPL W P-5'-P-CCNC: 12 U/L (ref 0–50)
ANION GAP SERPL CALCULATED.3IONS-SCNC: 12 MMOL/L (ref 7–15)
AST SERPL W P-5'-P-CCNC: 19 U/L (ref 0–45)
BASOPHILS # BLD AUTO: 0 10E3/UL (ref 0–0.2)
BASOPHILS NFR BLD AUTO: 1 %
BILIRUB SERPL-MCNC: 0.3 MG/DL
BUN SERPL-MCNC: 12.1 MG/DL (ref 6–20)
CALCIUM SERPL-MCNC: 9.4 MG/DL (ref 8.8–10.4)
CHLORIDE SERPL-SCNC: 102 MMOL/L (ref 98–107)
CREAT SERPL-MCNC: 0.62 MG/DL (ref 0.51–0.95)
EGFRCR SERPLBLD CKD-EPI 2021: >90 ML/MIN/1.73M2
EOSINOPHIL # BLD AUTO: 0 10E3/UL (ref 0–0.7)
EOSINOPHIL NFR BLD AUTO: 0 %
ERYTHROCYTE [DISTWIDTH] IN BLOOD BY AUTOMATED COUNT: 18.6 % (ref 10–15)
GLUCOSE SERPL-MCNC: 113 MG/DL (ref 70–99)
HCO3 SERPL-SCNC: 27 MMOL/L (ref 22–29)
HCT VFR BLD AUTO: 25 % (ref 35–47)
HGB BLD-MCNC: 8.1 G/DL (ref 11.7–15.7)
IMM GRANULOCYTES # BLD: 0.1 10E3/UL
IMM GRANULOCYTES NFR BLD: 1 %
LYMPHOCYTES # BLD AUTO: 1.1 10E3/UL (ref 0.8–5.3)
LYMPHOCYTES NFR BLD AUTO: 15 %
MCH RBC QN AUTO: 30.9 PG (ref 26.5–33)
MCHC RBC AUTO-ENTMCNC: 32.4 G/DL (ref 31.5–36.5)
MCV RBC AUTO: 95 FL (ref 78–100)
MONOCYTES # BLD AUTO: 1.6 10E3/UL (ref 0–1.3)
MONOCYTES NFR BLD AUTO: 24 %
NEUTROPHILS # BLD AUTO: 4.1 10E3/UL (ref 1.6–8.3)
NEUTROPHILS NFR BLD AUTO: 59 %
NRBC # BLD AUTO: 0.1 10E3/UL
NRBC BLD AUTO-RTO: 1 /100
PLAT MORPH BLD: NORMAL
PLATELET # BLD AUTO: 146 10E3/UL (ref 150–450)
POTASSIUM SERPL-SCNC: 3.7 MMOL/L (ref 3.4–5.3)
PROT SERPL-MCNC: 6.7 G/DL (ref 6.4–8.3)
RBC # BLD AUTO: 2.62 10E6/UL (ref 3.8–5.2)
RBC MORPH BLD: NORMAL
SODIUM SERPL-SCNC: 141 MMOL/L (ref 135–145)
WBC # BLD AUTO: 6.9 10E3/UL (ref 4–11)

## 2025-07-03 PROCEDURE — 80053 COMPREHEN METABOLIC PANEL: CPT | Mod: ZL

## 2025-07-03 PROCEDURE — 36415 COLL VENOUS BLD VENIPUNCTURE: CPT | Mod: ZL

## 2025-07-03 PROCEDURE — 85004 AUTOMATED DIFF WBC COUNT: CPT | Mod: ZL

## 2025-07-07 ENCOUNTER — LAB (OUTPATIENT)
Dept: LAB | Facility: OTHER | Age: 60
End: 2025-07-07
Payer: MEDICARE

## 2025-07-07 DIAGNOSIS — C92.00 ACUTE MYELOID LEUKEMIA NOT HAVING ACHIEVED REMISSION (H): ICD-10-CM

## 2025-07-07 LAB
ALBUMIN SERPL BCG-MCNC: 3.8 G/DL (ref 3.5–5.2)
ALP SERPL-CCNC: 116 U/L (ref 40–150)
ALT SERPL W P-5'-P-CCNC: 12 U/L (ref 0–50)
ANION GAP SERPL CALCULATED.3IONS-SCNC: 12 MMOL/L (ref 7–15)
AST SERPL W P-5'-P-CCNC: 20 U/L (ref 0–45)
BASOPHILS # BLD AUTO: 0.1 10E3/UL (ref 0–0.2)
BASOPHILS NFR BLD AUTO: 1 %
BILIRUB SERPL-MCNC: 0.4 MG/DL
BUN SERPL-MCNC: 6.8 MG/DL (ref 6–20)
CALCIUM SERPL-MCNC: 9.1 MG/DL (ref 8.8–10.4)
CHLORIDE SERPL-SCNC: 102 MMOL/L (ref 98–107)
CREAT SERPL-MCNC: 0.62 MG/DL (ref 0.51–0.95)
EGFRCR SERPLBLD CKD-EPI 2021: >90 ML/MIN/1.73M2
EOSINOPHIL # BLD AUTO: 0 10E3/UL (ref 0–0.7)
EOSINOPHIL NFR BLD AUTO: 1 %
ERYTHROCYTE [DISTWIDTH] IN BLOOD BY AUTOMATED COUNT: 21.2 % (ref 10–15)
GLUCOSE SERPL-MCNC: 114 MG/DL (ref 70–99)
HCO3 SERPL-SCNC: 25 MMOL/L (ref 22–29)
HCT VFR BLD AUTO: 27.1 % (ref 35–47)
HGB BLD-MCNC: 8.8 G/DL (ref 11.7–15.7)
IMM GRANULOCYTES # BLD: 0 10E3/UL
IMM GRANULOCYTES NFR BLD: 1 %
LYMPHOCYTES # BLD AUTO: 0.9 10E3/UL (ref 0.8–5.3)
LYMPHOCYTES NFR BLD AUTO: 14 %
MCH RBC QN AUTO: 31.8 PG (ref 26.5–33)
MCHC RBC AUTO-ENTMCNC: 32.5 G/DL (ref 31.5–36.5)
MCV RBC AUTO: 98 FL (ref 78–100)
MONOCYTES # BLD AUTO: 2.3 10E3/UL (ref 0–1.3)
MONOCYTES NFR BLD AUTO: 37 %
NEUTROPHILS # BLD AUTO: 2.8 10E3/UL (ref 1.6–8.3)
NEUTROPHILS NFR BLD AUTO: 46 %
NRBC # BLD AUTO: 0 10E3/UL
NRBC BLD AUTO-RTO: 1 /100
PLAT MORPH BLD: NORMAL
PLATELET # BLD AUTO: 216 10E3/UL (ref 150–450)
POTASSIUM SERPL-SCNC: 3.6 MMOL/L (ref 3.4–5.3)
PROT SERPL-MCNC: 6.9 G/DL (ref 6.4–8.3)
RBC # BLD AUTO: 2.77 10E6/UL (ref 3.8–5.2)
RBC MORPH BLD: NORMAL
SODIUM SERPL-SCNC: 139 MMOL/L (ref 135–145)
WBC # BLD AUTO: 6.1 10E3/UL (ref 4–11)

## 2025-07-07 PROCEDURE — 36415 COLL VENOUS BLD VENIPUNCTURE: CPT | Mod: ZL

## 2025-07-07 PROCEDURE — 85025 COMPLETE CBC W/AUTO DIFF WBC: CPT | Mod: ZL

## 2025-07-07 PROCEDURE — 84155 ASSAY OF PROTEIN SERUM: CPT | Mod: ZL

## 2025-07-09 ENCOUNTER — HOSPITAL ENCOUNTER (INPATIENT)
Facility: CLINIC | Age: 60
DRG: 839 | End: 2025-07-09
Attending: INTERNAL MEDICINE | Admitting: INTERNAL MEDICINE
Payer: MEDICARE

## 2025-07-09 ENCOUNTER — APPOINTMENT (OUTPATIENT)
Dept: LAB | Facility: CLINIC | Age: 60
DRG: 839 | End: 2025-07-09
Attending: STUDENT IN AN ORGANIZED HEALTH CARE EDUCATION/TRAINING PROGRAM
Payer: MEDICARE

## 2025-07-09 ENCOUNTER — ONCOLOGY VISIT (OUTPATIENT)
Dept: ONCOLOGY | Facility: CLINIC | Age: 60
End: 2025-07-09
Attending: STUDENT IN AN ORGANIZED HEALTH CARE EDUCATION/TRAINING PROGRAM
Payer: COMMERCIAL

## 2025-07-09 VITALS
TEMPERATURE: 98.4 F | BODY MASS INDEX: 29.05 KG/M2 | RESPIRATION RATE: 20 BRPM | HEART RATE: 83 BPM | SYSTOLIC BLOOD PRESSURE: 138 MMHG | OXYGEN SATURATION: 94 % | DIASTOLIC BLOOD PRESSURE: 81 MMHG | WEIGHT: 180 LBS

## 2025-07-09 DIAGNOSIS — C92.00 ACUTE MYELOID LEUKEMIA NOT HAVING ACHIEVED REMISSION (H): Primary | ICD-10-CM

## 2025-07-09 LAB
ALBUMIN SERPL BCG-MCNC: 4 G/DL (ref 3.5–5.2)
ALBUMIN SERPL BCG-MCNC: 4 G/DL (ref 3.5–5.2)
ALP SERPL-CCNC: 111 U/L (ref 40–150)
ALP SERPL-CCNC: 114 U/L (ref 40–150)
ALT SERPL W P-5'-P-CCNC: 10 U/L (ref 0–50)
ALT SERPL W P-5'-P-CCNC: 11 U/L (ref 0–50)
ANION GAP SERPL CALCULATED.3IONS-SCNC: 13 MMOL/L (ref 7–15)
ANION GAP SERPL CALCULATED.3IONS-SCNC: 15 MMOL/L (ref 7–15)
AST SERPL W P-5'-P-CCNC: 20 U/L (ref 0–45)
AST SERPL W P-5'-P-CCNC: 22 U/L (ref 0–45)
BASOPHILS # BLD AUTO: 0.1 10E3/UL (ref 0–0.2)
BASOPHILS # BLD MANUAL: 0.1 10E3/UL (ref 0–0.2)
BASOPHILS NFR BLD AUTO: 1 %
BASOPHILS NFR BLD MANUAL: 2 %
BILIRUB SERPL-MCNC: 0.4 MG/DL
BILIRUB SERPL-MCNC: 0.4 MG/DL
BUN SERPL-MCNC: 7.3 MG/DL (ref 6–20)
BUN SERPL-MCNC: 7.9 MG/DL (ref 6–20)
CALCIUM SERPL-MCNC: 9.4 MG/DL (ref 8.8–10.4)
CALCIUM SERPL-MCNC: 9.6 MG/DL (ref 8.8–10.4)
CHLORIDE SERPL-SCNC: 102 MMOL/L (ref 98–107)
CHLORIDE SERPL-SCNC: 103 MMOL/L (ref 98–107)
CREAT SERPL-MCNC: 0.7 MG/DL (ref 0.51–0.95)
CREAT SERPL-MCNC: 0.75 MG/DL (ref 0.51–0.95)
EGFRCR SERPLBLD CKD-EPI 2021: >90 ML/MIN/1.73M2
EGFRCR SERPLBLD CKD-EPI 2021: >90 ML/MIN/1.73M2
EOSINOPHIL # BLD AUTO: 0.1 10E3/UL (ref 0–0.7)
EOSINOPHIL # BLD MANUAL: 0 10E3/UL (ref 0–0.7)
EOSINOPHIL NFR BLD AUTO: 1 %
EOSINOPHIL NFR BLD MANUAL: 0 %
ERYTHROCYTE [DISTWIDTH] IN BLOOD BY AUTOMATED COUNT: 21.8 % (ref 10–15)
ERYTHROCYTE [DISTWIDTH] IN BLOOD BY AUTOMATED COUNT: 22.1 % (ref 10–15)
GLUCOSE SERPL-MCNC: 116 MG/DL (ref 70–99)
GLUCOSE SERPL-MCNC: 170 MG/DL (ref 70–99)
HCO3 SERPL-SCNC: 23 MMOL/L (ref 22–29)
HCO3 SERPL-SCNC: 25 MMOL/L (ref 22–29)
HCT VFR BLD AUTO: 27.4 % (ref 35–47)
HCT VFR BLD AUTO: 28.8 % (ref 35–47)
HGB BLD-MCNC: 8.9 G/DL (ref 11.7–15.7)
HGB BLD-MCNC: 9.2 G/DL (ref 11.7–15.7)
IMM GRANULOCYTES # BLD: 0.1 10E3/UL
IMM GRANULOCYTES NFR BLD: 1 %
LYMPHOCYTES # BLD AUTO: 0.9 10E3/UL (ref 0.8–5.3)
LYMPHOCYTES # BLD MANUAL: 0.9 10E3/UL (ref 0.8–5.3)
LYMPHOCYTES NFR BLD AUTO: 13 %
LYMPHOCYTES NFR BLD MANUAL: 13 %
MAGNESIUM SERPL-MCNC: 2.2 MG/DL (ref 1.7–2.3)
MCH RBC QN AUTO: 31.8 PG (ref 26.5–33)
MCH RBC QN AUTO: 31.9 PG (ref 26.5–33)
MCHC RBC AUTO-ENTMCNC: 31.9 G/DL (ref 31.5–36.5)
MCHC RBC AUTO-ENTMCNC: 32.5 G/DL (ref 31.5–36.5)
MCV RBC AUTO: 100 FL (ref 78–100)
MCV RBC AUTO: 98 FL (ref 78–100)
MONOCYTES # BLD AUTO: 2.4 10E3/UL (ref 0–1.3)
MONOCYTES # BLD MANUAL: 2.5 10E3/UL (ref 0–1.3)
MONOCYTES NFR BLD AUTO: 35 %
MONOCYTES NFR BLD MANUAL: 36 %
NEUTROPHILS # BLD AUTO: 3.5 10E3/UL (ref 1.6–8.3)
NEUTROPHILS # BLD MANUAL: 3.3 10E3/UL (ref 1.6–8.3)
NEUTROPHILS NFR BLD AUTO: 50 %
NEUTROPHILS NFR BLD MANUAL: 49 %
NRBC # BLD AUTO: 0.1 10E3/UL
NRBC # BLD AUTO: 0.1 10E3/UL
NRBC BLD AUTO-RTO: 1 /100
NRBC BLD MANUAL-RTO: 1 %
PHOSPHATE SERPL-MCNC: 4.5 MG/DL (ref 2.5–4.5)
PLAT MORPH BLD: ABNORMAL
PLAT MORPH BLD: ABNORMAL
PLATELET # BLD AUTO: 233 10E3/UL (ref 150–450)
PLATELET # BLD AUTO: 260 10E3/UL (ref 150–450)
POLYCHROMASIA BLD QL SMEAR: ABNORMAL
POLYCHROMASIA BLD QL SMEAR: ABNORMAL
POTASSIUM SERPL-SCNC: 3.2 MMOL/L (ref 3.4–5.3)
POTASSIUM SERPL-SCNC: 3.6 MMOL/L (ref 3.4–5.3)
PROT SERPL-MCNC: 7 G/DL (ref 6.4–8.3)
PROT SERPL-MCNC: 7.2 G/DL (ref 6.4–8.3)
RBC # BLD AUTO: 2.79 10E6/UL (ref 3.8–5.2)
RBC # BLD AUTO: 2.89 10E6/UL (ref 3.8–5.2)
RBC MORPH BLD: ABNORMAL
RBC MORPH BLD: ABNORMAL
SODIUM SERPL-SCNC: 140 MMOL/L (ref 135–145)
SODIUM SERPL-SCNC: 141 MMOL/L (ref 135–145)
TARGETS BLD QL SMEAR: SLIGHT
WBC # BLD AUTO: 6.8 10E3/UL (ref 4–11)
WBC # BLD AUTO: 7 10E3/UL (ref 4–11)

## 2025-07-09 PROCEDURE — 250N000011 HC RX IP 250 OP 636

## 2025-07-09 PROCEDURE — 120N000002 HC R&B MED SURG/OB UMMC

## 2025-07-09 PROCEDURE — 250N000013 HC RX MED GY IP 250 OP 250 PS 637

## 2025-07-09 PROCEDURE — 36569 INSJ PICC 5 YR+ W/O IMAGING: CPT

## 2025-07-09 PROCEDURE — 272N000451 HC KIT SHRLOCK 5FR POWER PICC DOUBLE LUMEN

## 2025-07-09 PROCEDURE — 85007 BL SMEAR W/DIFF WBC COUNT: CPT

## 2025-07-09 PROCEDURE — 250N000009 HC RX 250

## 2025-07-09 PROCEDURE — 250N000012 HC RX MED GY IP 250 OP 636 PS 637

## 2025-07-09 PROCEDURE — 80053 COMPREHEN METABOLIC PANEL: CPT

## 2025-07-09 PROCEDURE — 85018 HEMOGLOBIN: CPT

## 2025-07-09 PROCEDURE — 258N000003 HC RX IP 258 OP 636

## 2025-07-09 PROCEDURE — 36415 COLL VENOUS BLD VENIPUNCTURE: CPT

## 2025-07-09 PROCEDURE — 83735 ASSAY OF MAGNESIUM: CPT

## 2025-07-09 PROCEDURE — 84100 ASSAY OF PHOSPHORUS: CPT

## 2025-07-09 PROCEDURE — 3E04305 INTRODUCTION OF OTHER ANTINEOPLASTIC INTO CENTRAL VEIN, PERCUTANEOUS APPROACH: ICD-10-PCS | Performed by: INTERNAL MEDICINE

## 2025-07-09 PROCEDURE — G0463 HOSPITAL OUTPT CLINIC VISIT: HCPCS

## 2025-07-09 PROCEDURE — 80051 ELECTROLYTE PANEL: CPT

## 2025-07-09 PROCEDURE — 85025 COMPLETE CBC W/AUTO DIFF WBC: CPT

## 2025-07-09 PROCEDURE — 99223 1ST HOSP IP/OBS HIGH 75: CPT | Mod: AI

## 2025-07-09 RX ORDER — AMOXICILLIN 250 MG
1 CAPSULE ORAL 2 TIMES DAILY PRN
Status: DISCONTINUED | OUTPATIENT
Start: 2025-07-09 | End: 2025-07-12 | Stop reason: HOSPADM

## 2025-07-09 RX ORDER — EPINEPHRINE 1 MG/ML
0.3 INJECTION, SOLUTION INTRAMUSCULAR; SUBCUTANEOUS EVERY 5 MIN PRN
Status: CANCELLED | OUTPATIENT
Start: 2025-07-09

## 2025-07-09 RX ORDER — ACYCLOVIR 400 MG/1
400 TABLET ORAL 2 TIMES DAILY
Status: DISCONTINUED | OUTPATIENT
Start: 2025-07-09 | End: 2025-07-12 | Stop reason: HOSPADM

## 2025-07-09 RX ORDER — ALBUTEROL SULFATE 90 UG/1
1-2 INHALANT RESPIRATORY (INHALATION)
Status: DISCONTINUED | OUTPATIENT
Start: 2025-07-09 | End: 2025-07-12 | Stop reason: HOSPADM

## 2025-07-09 RX ORDER — LORAZEPAM 2 MG/ML
.5-1 INJECTION INTRAMUSCULAR EVERY 6 HOURS PRN
Status: DISCONTINUED | OUTPATIENT
Start: 2025-07-09 | End: 2025-07-12 | Stop reason: HOSPADM

## 2025-07-09 RX ORDER — PANTOPRAZOLE SODIUM 40 MG/1
40 TABLET, DELAYED RELEASE ORAL
Status: DISCONTINUED | OUTPATIENT
Start: 2025-07-09 | End: 2025-07-12 | Stop reason: HOSPADM

## 2025-07-09 RX ORDER — HYDROXYZINE HYDROCHLORIDE 25 MG/1
25-50 TABLET, FILM COATED ORAL EVERY 6 HOURS PRN
Status: DISCONTINUED | OUTPATIENT
Start: 2025-07-09 | End: 2025-07-12 | Stop reason: HOSPADM

## 2025-07-09 RX ORDER — CALCIUM CARBONATE 500 MG/1
500 TABLET, CHEWABLE ORAL 3 TIMES DAILY PRN
Status: DISCONTINUED | OUTPATIENT
Start: 2025-07-09 | End: 2025-07-12 | Stop reason: HOSPADM

## 2025-07-09 RX ORDER — PAROXETINE 20 MG/1
20 TABLET, FILM COATED ORAL EVERY MORNING
Status: DISCONTINUED | OUTPATIENT
Start: 2025-07-10 | End: 2025-07-12 | Stop reason: HOSPADM

## 2025-07-09 RX ORDER — DEXAMETHASONE 4 MG/1
4 TABLET ORAL EVERY 12 HOURS
Status: COMPLETED | OUTPATIENT
Start: 2025-07-09 | End: 2025-07-12

## 2025-07-09 RX ORDER — POSACONAZOLE 100 MG/1
300 TABLET, DELAYED RELEASE ORAL EVERY MORNING
Status: DISCONTINUED | OUTPATIENT
Start: 2025-07-10 | End: 2025-07-12 | Stop reason: HOSPADM

## 2025-07-09 RX ORDER — METHYLPREDNISOLONE SODIUM SUCCINATE 40 MG/ML
40 INJECTION INTRAMUSCULAR; INTRAVENOUS
Status: DISCONTINUED | OUTPATIENT
Start: 2025-07-09 | End: 2025-07-12 | Stop reason: HOSPADM

## 2025-07-09 RX ORDER — DEXAMETHASONE 4 MG/1
4 TABLET ORAL EVERY 12 HOURS
Status: CANCELLED
Start: 2025-07-09

## 2025-07-09 RX ORDER — ALBUTEROL SULFATE 0.83 MG/ML
2.5 SOLUTION RESPIRATORY (INHALATION)
Status: DISCONTINUED | OUTPATIENT
Start: 2025-07-09 | End: 2025-07-12 | Stop reason: HOSPADM

## 2025-07-09 RX ORDER — LORAZEPAM 0.5 MG/1
.5-1 TABLET ORAL EVERY 6 HOURS PRN
Status: DISCONTINUED | OUTPATIENT
Start: 2025-07-09 | End: 2025-07-12 | Stop reason: HOSPADM

## 2025-07-09 RX ORDER — ACETAMINOPHEN 325 MG/1
650 TABLET ORAL EVERY 4 HOURS PRN
Status: DISCONTINUED | OUTPATIENT
Start: 2025-07-09 | End: 2025-07-12 | Stop reason: HOSPADM

## 2025-07-09 RX ORDER — MEPERIDINE HYDROCHLORIDE 25 MG/ML
25 INJECTION INTRAMUSCULAR; INTRAVENOUS; SUBCUTANEOUS
Status: DISCONTINUED | OUTPATIENT
Start: 2025-07-09 | End: 2025-07-12 | Stop reason: HOSPADM

## 2025-07-09 RX ORDER — BUDESONIDE AND FORMOTEROL FUMARATE DIHYDRATE 80; 4.5 UG/1; UG/1
2 AEROSOL RESPIRATORY (INHALATION) 2 TIMES DAILY
Status: DISCONTINUED | OUTPATIENT
Start: 2025-07-09 | End: 2025-07-12 | Stop reason: HOSPADM

## 2025-07-09 RX ORDER — ONDANSETRON 8 MG/1
8 TABLET, FILM COATED ORAL EVERY 12 HOURS
Status: CANCELLED | OUTPATIENT
Start: 2025-07-09

## 2025-07-09 RX ORDER — PREDNISOLONE ACETATE 10 MG/ML
2 SUSPENSION/ DROPS OPHTHALMIC 4 TIMES DAILY
Status: CANCELLED | OUTPATIENT
Start: 2025-07-09

## 2025-07-09 RX ORDER — MULTIVITAMIN WITH IRON
500 TABLET ORAL DAILY
Status: DISCONTINUED | OUTPATIENT
Start: 2025-07-10 | End: 2025-07-12 | Stop reason: HOSPADM

## 2025-07-09 RX ORDER — ALBUTEROL SULFATE 0.83 MG/ML
2.5 SOLUTION RESPIRATORY (INHALATION) 4 TIMES DAILY PRN
Status: DISCONTINUED | OUTPATIENT
Start: 2025-07-09 | End: 2025-07-12 | Stop reason: HOSPADM

## 2025-07-09 RX ORDER — MEPERIDINE HYDROCHLORIDE 25 MG/ML
25 INJECTION INTRAMUSCULAR; INTRAVENOUS; SUBCUTANEOUS
Status: CANCELLED | OUTPATIENT
Start: 2025-07-09

## 2025-07-09 RX ORDER — LORAZEPAM 2 MG/ML
.5-1 INJECTION INTRAMUSCULAR EVERY 6 HOURS PRN
Status: CANCELLED | OUTPATIENT
Start: 2025-07-09

## 2025-07-09 RX ORDER — AMOXICILLIN 250 MG
2 CAPSULE ORAL 2 TIMES DAILY PRN
Status: DISCONTINUED | OUTPATIENT
Start: 2025-07-09 | End: 2025-07-12 | Stop reason: HOSPADM

## 2025-07-09 RX ORDER — ONDANSETRON 8 MG/1
8 TABLET, FILM COATED ORAL EVERY 12 HOURS
Status: DISCONTINUED | OUTPATIENT
Start: 2025-07-09 | End: 2025-07-12 | Stop reason: HOSPADM

## 2025-07-09 RX ORDER — DIPHENHYDRAMINE HYDROCHLORIDE 50 MG/ML
50 INJECTION, SOLUTION INTRAMUSCULAR; INTRAVENOUS
Status: DISCONTINUED | OUTPATIENT
Start: 2025-07-09 | End: 2025-07-12 | Stop reason: HOSPADM

## 2025-07-09 RX ORDER — VITAMIN B COMPLEX
25 TABLET ORAL DAILY
Status: DISCONTINUED | OUTPATIENT
Start: 2025-07-10 | End: 2025-07-12 | Stop reason: HOSPADM

## 2025-07-09 RX ORDER — DIPHENHYDRAMINE HYDROCHLORIDE 50 MG/ML
50 INJECTION, SOLUTION INTRAMUSCULAR; INTRAVENOUS
Status: CANCELLED
Start: 2025-07-09

## 2025-07-09 RX ORDER — ALBUTEROL SULFATE 90 UG/1
1-2 INHALANT RESPIRATORY (INHALATION) 4 TIMES DAILY PRN
Status: DISCONTINUED | OUTPATIENT
Start: 2025-07-09 | End: 2025-07-12 | Stop reason: HOSPADM

## 2025-07-09 RX ORDER — LORAZEPAM 0.5 MG/1
.5-1 TABLET ORAL EVERY 6 HOURS PRN
Status: CANCELLED
Start: 2025-07-09

## 2025-07-09 RX ORDER — LORATADINE 10 MG/1
10 TABLET ORAL DAILY
Status: DISCONTINUED | OUTPATIENT
Start: 2025-07-10 | End: 2025-07-12 | Stop reason: HOSPADM

## 2025-07-09 RX ORDER — FAMOTIDINE 20 MG/1
20 TABLET, FILM COATED ORAL 2 TIMES DAILY
Status: DISCONTINUED | OUTPATIENT
Start: 2025-07-09 | End: 2025-07-12 | Stop reason: HOSPADM

## 2025-07-09 RX ORDER — HEPARIN SODIUM,PORCINE 10 UNIT/ML
5-15 VIAL (ML) INTRAVENOUS
Status: DISCONTINUED | OUTPATIENT
Start: 2025-07-09 | End: 2025-07-12 | Stop reason: HOSPADM

## 2025-07-09 RX ORDER — ALBUTEROL SULFATE 0.83 MG/ML
2.5 SOLUTION RESPIRATORY (INHALATION)
Status: CANCELLED | OUTPATIENT
Start: 2025-07-09

## 2025-07-09 RX ORDER — PROCHLORPERAZINE MALEATE 5 MG/1
5 TABLET ORAL EVERY 6 HOURS PRN
Status: DISCONTINUED | OUTPATIENT
Start: 2025-07-09 | End: 2025-07-12 | Stop reason: HOSPADM

## 2025-07-09 RX ORDER — METHYLPREDNISOLONE SODIUM SUCCINATE 40 MG/ML
40 INJECTION INTRAMUSCULAR; INTRAVENOUS
Status: CANCELLED
Start: 2025-07-09

## 2025-07-09 RX ORDER — POLYETHYLENE GLYCOL 3350 17 G/17G
17 POWDER, FOR SOLUTION ORAL 2 TIMES DAILY PRN
Status: DISCONTINUED | OUTPATIENT
Start: 2025-07-09 | End: 2025-07-12 | Stop reason: HOSPADM

## 2025-07-09 RX ORDER — POTASSIUM CHLORIDE 750 MG/1
20 TABLET, EXTENDED RELEASE ORAL DAILY
Status: DISCONTINUED | OUTPATIENT
Start: 2025-07-09 | End: 2025-07-12 | Stop reason: HOSPADM

## 2025-07-09 RX ORDER — LIDOCAINE 40 MG/G
CREAM TOPICAL
Status: DISCONTINUED | OUTPATIENT
Start: 2025-07-09 | End: 2025-07-12 | Stop reason: HOSPADM

## 2025-07-09 RX ORDER — HEPARIN SODIUM,PORCINE 10 UNIT/ML
5-15 VIAL (ML) INTRAVENOUS EVERY 24 HOURS
Status: DISCONTINUED | OUTPATIENT
Start: 2025-07-09 | End: 2025-07-12 | Stop reason: HOSPADM

## 2025-07-09 RX ORDER — LISINOPRIL 10 MG/1
10 TABLET ORAL DAILY
Status: DISCONTINUED | OUTPATIENT
Start: 2025-07-10 | End: 2025-07-12 | Stop reason: HOSPADM

## 2025-07-09 RX ORDER — ALBUTEROL SULFATE 90 UG/1
1-2 INHALANT RESPIRATORY (INHALATION)
Status: CANCELLED
Start: 2025-07-09

## 2025-07-09 RX ORDER — PROCHLORPERAZINE MALEATE 5 MG/1
10 TABLET ORAL EVERY 6 HOURS PRN
Status: DISCONTINUED | OUTPATIENT
Start: 2025-07-09 | End: 2025-07-09

## 2025-07-09 RX ORDER — PREDNISOLONE ACETATE 10 MG/ML
2 SUSPENSION/ DROPS OPHTHALMIC 4 TIMES DAILY
Status: DISCONTINUED | OUTPATIENT
Start: 2025-07-09 | End: 2025-07-12 | Stop reason: HOSPADM

## 2025-07-09 RX ORDER — PROCHLORPERAZINE MALEATE 10 MG
10 TABLET ORAL EVERY 6 HOURS PRN
Status: CANCELLED
Start: 2025-07-09

## 2025-07-09 RX ORDER — ENOXAPARIN SODIUM 100 MG/ML
40 INJECTION SUBCUTANEOUS EVERY 24 HOURS
Status: DISCONTINUED | OUTPATIENT
Start: 2025-07-09 | End: 2025-07-12 | Stop reason: HOSPADM

## 2025-07-09 RX ORDER — SUCRALFATE ORAL 1 G/10ML
1 SUSPENSION ORAL
Status: DISCONTINUED | OUTPATIENT
Start: 2025-07-09 | End: 2025-07-12 | Stop reason: HOSPADM

## 2025-07-09 RX ORDER — EPINEPHRINE 1 MG/ML
0.3 INJECTION, SOLUTION, CONCENTRATE INTRAVENOUS EVERY 5 MIN PRN
Status: DISCONTINUED | OUTPATIENT
Start: 2025-07-09 | End: 2025-07-12 | Stop reason: HOSPADM

## 2025-07-09 RX ORDER — DIPHENHYDRAMINE HYDROCHLORIDE 50 MG/ML
25 INJECTION, SOLUTION INTRAMUSCULAR; INTRAVENOUS
Status: DISCONTINUED | OUTPATIENT
Start: 2025-07-09 | End: 2025-07-12 | Stop reason: HOSPADM

## 2025-07-09 RX ORDER — DIPHENHYDRAMINE HYDROCHLORIDE 50 MG/ML
25 INJECTION, SOLUTION INTRAMUSCULAR; INTRAVENOUS
Status: CANCELLED
Start: 2025-07-09

## 2025-07-09 RX ADMIN — DEXAMETHASONE 4 MG: 4 TABLET ORAL at 17:15

## 2025-07-09 RX ADMIN — SUCRALFATE 1 G: 1 SUSPENSION ORAL at 21:06

## 2025-07-09 RX ADMIN — PREDNISOLONE ACETATE 2 DROP: 10 SUSPENSION/ DROPS OPHTHALMIC at 17:15

## 2025-07-09 RX ADMIN — PANTOPRAZOLE SODIUM 40 MG: 40 TABLET, DELAYED RELEASE ORAL at 15:58

## 2025-07-09 RX ADMIN — ENOXAPARIN SODIUM 40 MG: 40 INJECTION SUBCUTANEOUS at 19:34

## 2025-07-09 RX ADMIN — Medication 5 ML: at 20:57

## 2025-07-09 RX ADMIN — PREDNISOLONE ACETATE 2 DROP: 10 SUSPENSION/ DROPS OPHTHALMIC at 21:07

## 2025-07-09 RX ADMIN — ACETAMINOPHEN 650 MG: 325 TABLET ORAL at 15:56

## 2025-07-09 RX ADMIN — GABAPENTIN 400 MG: 300 CAPSULE ORAL at 19:34

## 2025-07-09 RX ADMIN — CYTARABINE 5730 MG: 100 INJECTION, SOLUTION INTRATHECAL; INTRAVENOUS; SUBCUTANEOUS at 17:42

## 2025-07-09 RX ADMIN — POTASSIUM CHLORIDE 20 MEQ: 750 TABLET, EXTENDED RELEASE ORAL at 16:04

## 2025-07-09 RX ADMIN — ACYCLOVIR 400 MG: 400 TABLET ORAL at 19:34

## 2025-07-09 RX ADMIN — BUDESONIDE AND FORMOTEROL FUMARATE DIHYDRATE 2 PUFF: 80; 4.5 AEROSOL RESPIRATORY (INHALATION) at 19:34

## 2025-07-09 RX ADMIN — ONDANSETRON HYDROCHLORIDE 8 MG: 8 TABLET, FILM COATED ORAL at 17:15

## 2025-07-09 RX ADMIN — GABAPENTIN 400 MG: 300 CAPSULE ORAL at 15:58

## 2025-07-09 RX ADMIN — LIDOCAINE HYDROCHLORIDE ANHYDROUS 3 ML: 10 INJECTION, SOLUTION INFILTRATION at 13:39

## 2025-07-09 RX ADMIN — FAMOTIDINE 20 MG: 20 TABLET, FILM COATED ORAL at 19:34

## 2025-07-09 RX ADMIN — SUCRALFATE 1 G: 1 SUSPENSION ORAL at 15:58

## 2025-07-09 ASSESSMENT — ACTIVITIES OF DAILY LIVING (ADL)
ADLS_ACUITY_SCORE: 59
ADLS_ACUITY_SCORE: 38
ADLS_ACUITY_SCORE: 58
ADLS_ACUITY_SCORE: 38
ADLS_ACUITY_SCORE: 38
ADLS_ACUITY_SCORE: 58
ADLS_ACUITY_SCORE: 38
ADLS_ACUITY_SCORE: 59
ADLS_ACUITY_SCORE: 38
ADLS_ACUITY_SCORE: 38
ADLS_ACUITY_SCORE: 58
ADLS_ACUITY_SCORE: 38

## 2025-07-09 ASSESSMENT — PAIN SCALES - GENERAL: PAINLEVEL_OUTOF10: MODERATE PAIN (6)

## 2025-07-09 NOTE — PROGRESS NOTES
[facility]    HEMATOLOGY & ONCOLOGY  FOLLOW UP    PATIENT NAME: Alejandra Villegas MRN # [id number]  DATE OF VISIT: 07/09/2025 YOB: 1965    REFERRING PROVIDER: Dr. 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 She presents for follow up    PCP Dec 2024 w/ progressive fatigue and nausea where she was found leukopenic WBC 2.4 and neutropenic w/ ANC 0.3. Hgb 12   Referred to local hematology/oncology clinic for further evaluation and seen by Dr. Samina Harris  BMBx 2/10/25 done at OSH showed hypercellular (80-90%) marrow w/ trilineage hematopoiesis w/ dyspoiesis with mildly elevated blasts of 4% on morphology. [facility] pathology read 8% blasts. By WHO 2022 met criteria for AML w/ NPM1 mutation   NGS: NPM1 (37.5), IDH2 (39.2%), and NRAS (16.2%) mutations  CG: Normal  Induction chemotherapy with 7+3 on 3/5/25  D14 BMBx showing no morpohlogic or immunophenotypic evidence of AML  Day 28 BMBx 3/31/25 showing hypercellular (60-70%) with no over dysplasia or increase in blasts. Flow noted 4.3% blasts of unusual phenotype (increased CD7,partial CD56). Blasts were CD34+ while diagnostic flow showing AML that was negative for CD34)  NGS: No mutations identified   C1 HiDAC (3g/m2) on 4/3/25  C2 HiDAC (3g/m2) on 5/6/25  Brief hospitalization 5/19 - 5/20 for febrile neutropenia, recovered quickly and discharged    SUBJECTIVE  Alejandra presents today for clearance prior to admission for C4 of HiDAC.     Saw her PCP, they put her back on her lisinopril. Her BP has been much more stable since then.      No fevers, no SOB or worsening cough. No more stomach pain, diarrhea stopped. No blood in stool or black tarry stool. No  issues. No  bleeding or fevers.     ROS otherwise negative.     CURRENT OUTPATIENT MEDICATIONS  Current Outpatient Medications   Medication Sig Dispense Refill    acetaminophen (TYLENOL) 325 MG tablet Take 2 tablets (650 mg) by mouth every 4 hours as needed for mild pain. 90 tablet 0    acyclovir (ZOVIRAX) 400 MG tablet Take 1 tablet (400 mg) by mouth 2 times daily. 120 tablet 0    albuterol (PROAIR HFA/PROVENTIL HFA/VENTOLIN HFA) 108 (90 Base) MCG/ACT inhaler Inhale 1-2 puffs into the lungs 4 times daily as needed (Refractory bronchospasm associated with hypersensitivity reaction). 18 g 0    albuterol (PROVENTIL) (2.5 MG/3ML) 0.083% neb solution Take 1 vial (2.5 mg) by nebulization 4 times daily as needed (Refractory bronchospasm associated with hypersensitivity reaction). 90 mL 0    budesonide-formoterol (SYMBICORT) 80-4.5 MCG/ACT Inhaler Inhale 2 puffs into the lungs 2 times daily - Rinse mouth well after use to prevent Thrush 10.2 g 11    calcium carbonate (TUMS) 500 MG chewable tablet Take 1 tablet (500 mg) by mouth 3 times daily as needed for heartburn. 90 tablet 0    cyanocobalamin (VITAMIN B-12) 500 MCG tablet Take 500 mcg by mouth daily.      cyclobenzaprine (FLEXERIL) 10 MG tablet Take 1 tablet (10 mg) by mouth 3 times daily as needed for muscle spasms. 15 tablet 0    famotidine (PEPCID) 20 MG tablet Take 1 tablet (20 mg) by mouth 2 times daily. 60 tablet 0    fluticasone (FLONASE) 50 MCG/ACT nasal spray Spray 2 sprays into both nostrils 2 times daily. 15.8 mL 0    gabapentin (NEURONTIN) 400 MG capsule Take 1 capsule (400 mg) by mouth 3 times daily. 90 capsule 3    hydrOXYzine HCl (ATARAX) 25 MG tablet Take 1-2 tablets (25-50 mg) by mouth every 6 hours as needed for anxiety or itching (adjuvant pain, sleep). 90 tablet 3    lisinopril (ZESTRIL) 10 MG tablet Take 1 tablet (10 mg) by mouth daily. 90 tablet 3    loratadine (CLARITIN) 10 MG tablet Take 1 tablet (10 mg) by mouth daily. 90 tablet 3    ondansetron (ZOFRAN  ODT) 4 MG ODT tab Take 1-2 tablets (4-8 mg) by mouth every 8 hours as needed for nausea or vomiting. 45 tablet 3    order for DME Equipment being ordered: home neb set up with mask and tubing 1 Device 0    pantoprazole (PROTONIX) 40 MG EC tablet Take 1 tablet (40 mg) by mouth 2 times daily (before meals). 180 tablet 3    PARoxetine (PAXIL) 20 MG tablet Take 1 tablet (20 mg) by mouth every morning. 90 tablet 3    posaconazole (NOXAFIL) 100 MG DR tablet Take 3 tablets (300 mg) by mouth every morning. 90 tablet 0    potassium chloride ER (K-TAB) 20 MEQ CR tablet Take 1 tablet (20 mEq) by mouth daily. 90 tablet 3    prochlorperazine (COMPAZINE) 5 MG tablet Take 1 tablet (5 mg) by mouth every 6 hours as needed for nausea or vomiting. 30 tablet 3    rosuvastatin (CRESTOR) 20 MG tablet Take 1 tablet (20 mg) by mouth daily. 90 tablet 3    sucralfate (CARAFATE) 1 GM/10ML suspension Take 10 mLs (1 g) by mouth 4 times daily (before meals and nightly).      SUMAtriptan (IMITREX) 50 MG tablet Take 1 tablet (50 mg) by mouth at onset of headache for migraine. 30 tablet 0    Vitamin D3 (CHOLECALCIFEROL) 25 mcg (1000 units) tablet Take 1 tablet (25 mcg) by mouth daily. 90 tablet 3       ALLERGIES  Allergies   Allergen Reactions    Bee Pollen Swelling     Seasonal    Adhesive Tape Rash    Amoxicillin Rash    Chlorhexidine Rash     After several weeks, started to develop rash on R neck down to chest and arm. Also noted on back of knees. Providers suggesting related to CHG as nothing else is new for pt.     Folic Acid Itching    Liquid Adhesive Rash    Meloxicam Rash    Nabumetone GI Disturbance     GI upset    Pollen Extract      Seasonal       REVIEW OF SYSTEMS  A complete ROS was performed and was negative except as mentioned in HPI    PHYSICAL EXAM  LMP 01/01/2007 (Approximate)   @LASTSAO2(4)@  Wt Readings from Last 3 Encounters:   06/26/25 81.3 kg (179 lb 3.2 oz)   06/25/25 80.1 kg (176 lb 9.6 oz)   06/19/25 80.7 kg (178 lb)      Most Recent 3 CBC's:  Recent Labs   Lab Test 07/09/25  0849 07/07/25  0806 07/03/25  0847   WBC 7.0 6.1 6.9   HGB 8.9* 8.8* 8.1*   MCV 98 98 95    216 146*   ANEU 3.5 2.8 4.1     Most Recent 3 BMP's:  Recent Labs   Lab Test 07/09/25  0849 07/07/25  0806 07/03/25  0847    139 141   POTASSIUM 3.2* 3.6 3.7   CHLORIDE 103 102 102   CO2 23 25 27   BUN 7.3 6.8 12.1   CR 0.70 0.62 0.62   ANIONGAP 15 12 12   TOBIN 9.4 9.1 9.4   * 114* 113*   PROTTOTAL 7.0 6.9 6.7   ALBUMIN 4.0 3.8 3.7    Most Recent 3 LFT's:  Recent Labs   Lab Test 07/09/25  0849 07/07/25  0806 07/03/25  0847   AST 20 20 19   ALT 10 12 12   ALKPHOS 114 116 124   BILITOTAL 0.4 0.4 0.3    Most Recent 2 TSH and T4:No lab results found.  I reviewed the above labs today.    GENERAL: alert and no distress  EYES: Eyes grossly normal to inspection.  No discharge or erythema, or obvious scleral/conjunctival abnormalities.  RESP: No audible wheeze, cough, or visible cyanosis.    SKIN: Visible skin clear. No significant rash, abnormal pigmentation or lesions.  NEURO: Cranial nerves grossly intact.  Mentation and speech appropriate for age.  PSYCH: Appropriate affect, tone, and pace of words    LABORATORY AND IMAGING STUDIES        BMBx 6/6/25  Final Diagnosis   Bone marrow, posterior iliac crest, right decalcified trephine biopsy, touch imprint, particle crush, direct aspirate smear, concentrated aspirate smear, and peripheral blood smear:     -No morphologic or immunophenotypic evidence of acute myeloid leukemia  -Hypercellular marrow for age (cellularity estimated at 60-70%) with trilineage hematopoietic maturation, no overt dysplasia, and no increase in blasts  -Peripheral blood showing slight hypochromic, normocytic anemia; lymphopenia and monocytosis; slight thrombocytosis  -See comment   Electronically signed by Anahi Burns MD on 6/10/2025 at 1214 CDT   Comment  [facility]   Concurrent flow cytometry ([id number]) showed No increase in  myeloid blasts and no abnormal myeloid blast population     Given the genetic profile of leukemia at the time of diagnosis with NPM1, IDH2 and NRAS mutations, correlation with the results of ancillary tests and clinical findings is recommended for further assessment of residual disease.      Concurrent ancillary studies are in progress and will be reported separately.  Correlation with the results of ancillary tests and clinical findings is recommended.     The red blood cell morphology may not be representative for this patient due to recent red blood cell transfusion.              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 She presents to [facility] care.    # AML w/ mutated NPM1 in remission - Normal cytogenetics with NPM1, IDH2 and NRAS mutations. MRD negative by NGS on D28 marrow and now s/p 2 cycles of HiDAC consolidation. Her primary oncologist is Dr. Samina Harris at [facility].     She has now tolerated 3 cycles HiDAC well without significant complications other than brief neutropenic fever. Plan for hopefully 4 cycles and if remains MRD negative can monitor for releapse peripherally.     - Neutropenic 6/19. Had previously been on levofloxacin, given Qtc of ~ 487 in May started cefpodoxime 200 mg BID instead for abx coverage. Patient was seen virtually so unable to get updated Qtc. Otherwise advised Alejandra to continue posa and acyclovir.      Ppx - Cefpodoxime, Posa, ACV     Plan:   - Proceed with admission today for C4 of HiDAC.   - Will need potassium today - did not take her K this morning.   - Continue 3x weekly labs w PRN transfusions  - Please ensure neulasta scheduled  - Post hospital follow up a week after discharge.   - I am sending a message  to Dr. Griffiths to discuss post C4 HiDAC plans. Will request he see her in a month for follow up.       The longitudinal plan of care for the diagnosis(es)/condition(s) as documented were addressed during this visit. Due to the added complexity in care, I will continue to support Alejandra in the subsequent management and with ongoing continuity of care.    AMARILIS Vick [facility]  [address] [place] [address] [id number] [phone]

## 2025-07-09 NOTE — PLAN OF CARE
Goal Outcome Evaluation:      Plan of Care Reviewed With: patient    Overall Patient Progress: no changeOverall Patient Progress: no change    Outcome Evaluation: Admitted from Clinic for Cycle 4 HiDAC; getting PICC placed this afternoon. Chemo will be 5p/5a (done Sat am 07/12)    Patient feeling well upon admission from clinic. PICC placed at bedside. Patient has a good appetite. Patient here for Cycle 4 HiDAC to start at 5pm per patient preference so she can discharge Saturday morning. Patient still needs 2 RN skin check-patient & sister left at 0430 to get to clinic appointment. Admission questions completed by Virtual RN.

## 2025-07-09 NOTE — PROCEDURES
[facility]    Double Lumen PICC Placement    Date/Time: 7/9/2025 1:59 PM    Performed by: Savana Maldonado RN  Authorized by: Nery Crowell PA-C  Indications: vascular access      UNIVERSAL PROTOCOL   Site Marked: Yes  Prior Images Obtained and Reviewed:  Yes  Required items: Required blood products, implants, devices and special equipment available    Patient identity confirmed:  Verbally with patient, arm band and hospital-assigned identification number  NA - No sedation, light sedation, or local anesthesia (lidocaine was given local anesthesia)  Confirmation Checklist:  Patient's identity using two indicators, relevant allergies, procedure was appropriate and matched the consent or emergent situation and correct equipment/implants were available  Time out: Immediately prior to the procedure a time out was called    Universal Protocol: the Joint Commission Universal Protocol was followed    Preparation: Patient was prepped and draped in usual sterile fashion       ANESTHESIA    Anesthesia:  Local infiltration  Local Anesthetic:  Lidocaine 1% without epinephrine  Anesthetic Total (mL):  3      SEDATION    Patient Sedated: No        Preparation: skin prepped with 2% chlorhexidine and skin prepped with ChloraPrep  Skin prep agent: skin prep agent completely dried prior to procedure  Sterile barriers: maximum sterile barriers were used: cap, mask, sterile gown, sterile gloves, and large sterile sheet  Hand hygiene: hand hygiene performed prior to central venous catheter insertion  Type of line used: PICC  Catheter type: double lumen  Lumen type: non-valved and power PICC  Lumen Identification: Purple and Red  Catheter size: 5 Fr  Brand: Bard  Lot number: [id number]  Placement method: venipuncture, MST, ultrasound and tip navigation system  Number of attempts: 1  Difficulty threading catheter: no  Successful placement: yes  Orientation: right  Catheter to Vein  (%): 31  Location: basilic vein (0.56 cm vein diameter)  Tip Location: SVC  Arm circumference: adults 10 cm  Extremity circumference: 30  Visible catheter length: 1  Total catheter length: 41  Dressing and securement: adhesive securement device, alcohol impregnated caps, dressing applied, gloves changed prior to final dressing, site cleansed, statlock, sterile dressing applied and transparent dressing (Algidex (pt has allergy with chlorhexidine))  Post procedure assessment: blood return through all ports, free fluid flow and placement verified by 3CG technology  PROCEDURE Describe Procedure: PICC placement verified by wrenchguys mobile 3CG technology, PICC okay to use.  Disposal: sharps and needle count correct at the end of procedure, needles and guidewire disposed in sharps container  Patient Tolerance:  Patient tolerated the procedure well with no immediate complications

## 2025-07-09 NOTE — PROGRESS NOTES
Vascular Access Services Notes:     PICC to be placed today as ordered. Primary RN to assure that patient have had a shower (allergic to CHG) & linen change prior to procedure. RN to contact VAS once done.         Gustavo Torrez, JOHNN, RN [facility]  Vascular Access Services  [facility]  [phone]

## 2025-07-09 NOTE — LETTER
7/9/2025      Alejandra Villegas  [address]  [place] [address]      Dear Colleague,    Thank you for referring your patient, Alejandra Villegas, to the [facility]. Please see a copy of my visit note below.    [facility]    HEMATOLOGY & ONCOLOGY  FOLLOW UP    PATIENT NAME: Alejandra Villegas MRN # [id number]  DATE OF VISIT: 07/09/2025 YOB: 1965    REFERRING PROVIDER: Dr. 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 She presents for follow up    PCP Dec 2024 w/ progressive fatigue and nausea where she was found leukopenic WBC 2.4 and neutropenic w/ ANC 0.3. Hgb 12   Referred to local hematology/oncology clinic for further evaluation and seen by Dr. Samina Harris  BMBx 2/10/25 done at OSH showed hypercellular (80-90%) marrow w/ trilineage hematopoiesis w/ dyspoiesis with mildly elevated blasts of 4% on morphology. [facility] pathology read 8% blasts. By WHO 2022 met criteria for AML w/ NPM1 mutation   NGS: NPM1 (37.5), IDH2 (39.2%), and NRAS (16.2%) mutations  CG: Normal  Induction chemotherapy with 7+3 on 3/5/25  D14 BMBx showing no morpohlogic or immunophenotypic evidence of AML  Day 28 BMBx 3/31/25 showing hypercellular (60-70%) with no over dysplasia or increase in blasts. Flow noted 4.3% blasts of unusual phenotype (increased CD7,partial CD56). Blasts were CD34+ while diagnostic flow showing AML that was negative for CD34)  NGS: No mutations identified   C1 HiDAC (3g/m2) on 4/3/25  C2 HiDAC (3g/m2) on 5/6/25  Brief hospitalization 5/19 - 5/20 for febrile neutropenia, recovered quickly and discharged    SUBJECTIVE  Alejandra presents today for clearance prior to admission for C4 of  Nicolasa.     Saw her PCP, they put her back on her lisinopril. Her BP has been much more stable since then.      No fevers, no SOB or worsening cough. No more stomach pain, diarrhea stopped. No blood in stool or black tarry stool. No  issues. No bleeding or fevers.     ROS otherwise negative.     CURRENT OUTPATIENT MEDICATIONS  Current Outpatient Medications   Medication Sig Dispense Refill     acetaminophen (TYLENOL) 325 MG tablet Take 2 tablets (650 mg) by mouth every 4 hours as needed for mild pain. 90 tablet 0     acyclovir (ZOVIRAX) 400 MG tablet Take 1 tablet (400 mg) by mouth 2 times daily. 120 tablet 0     albuterol (PROAIR HFA/PROVENTIL HFA/VENTOLIN HFA) 108 (90 Base) MCG/ACT inhaler Inhale 1-2 puffs into the lungs 4 times daily as needed (Refractory bronchospasm associated with hypersensitivity reaction). 18 g 0     albuterol (PROVENTIL) (2.5 MG/3ML) 0.083% neb solution Take 1 vial (2.5 mg) by nebulization 4 times daily as needed (Refractory bronchospasm associated with hypersensitivity reaction). 90 mL 0     budesonide-formoterol (SYMBICORT) 80-4.5 MCG/ACT Inhaler Inhale 2 puffs into the lungs 2 times daily - Rinse mouth well after use to prevent Thrush 10.2 g 11     calcium carbonate (TUMS) 500 MG chewable tablet Take 1 tablet (500 mg) by mouth 3 times daily as needed for heartburn. 90 tablet 0     cyanocobalamin (VITAMIN B-12) 500 MCG tablet Take 500 mcg by mouth daily.       cyclobenzaprine (FLEXERIL) 10 MG tablet Take 1 tablet (10 mg) by mouth 3 times daily as needed for muscle spasms. 15 tablet 0     famotidine (PEPCID) 20 MG tablet Take 1 tablet (20 mg) by mouth 2 times daily. 60 tablet 0     fluticasone (FLONASE) 50 MCG/ACT nasal spray Spray 2 sprays into both nostrils 2 times daily. 15.8 mL 0     gabapentin (NEURONTIN) 400 MG capsule Take 1 capsule (400 mg) by mouth 3 times daily. 90 capsule 3     hydrOXYzine HCl (ATARAX) 25 MG tablet Take 1-2 tablets (25-50 mg) by mouth every 6 hours as needed  for anxiety or itching (adjuvant pain, sleep). 90 tablet 3     lisinopril (ZESTRIL) 10 MG tablet Take 1 tablet (10 mg) by mouth daily. 90 tablet 3     loratadine (CLARITIN) 10 MG tablet Take 1 tablet (10 mg) by mouth daily. 90 tablet 3     ondansetron (ZOFRAN ODT) 4 MG ODT tab Take 1-2 tablets (4-8 mg) by mouth every 8 hours as needed for nausea or vomiting. 45 tablet 3     order for DME Equipment being ordered: home neb set up with mask and tubing 1 Device 0     pantoprazole (PROTONIX) 40 MG EC tablet Take 1 tablet (40 mg) by mouth 2 times daily (before meals). 180 tablet 3     PARoxetine (PAXIL) 20 MG tablet Take 1 tablet (20 mg) by mouth every morning. 90 tablet 3     posaconazole (NOXAFIL) 100 MG DR tablet Take 3 tablets (300 mg) by mouth every morning. 90 tablet 0     potassium chloride ER (K-TAB) 20 MEQ CR tablet Take 1 tablet (20 mEq) by mouth daily. 90 tablet 3     prochlorperazine (COMPAZINE) 5 MG tablet Take 1 tablet (5 mg) by mouth every 6 hours as needed for nausea or vomiting. 30 tablet 3     rosuvastatin (CRESTOR) 20 MG tablet Take 1 tablet (20 mg) by mouth daily. 90 tablet 3     sucralfate (CARAFATE) 1 GM/10ML suspension Take 10 mLs (1 g) by mouth 4 times daily (before meals and nightly).       SUMAtriptan (IMITREX) 50 MG tablet Take 1 tablet (50 mg) by mouth at onset of headache for migraine. 30 tablet 0     Vitamin D3 (CHOLECALCIFEROL) 25 mcg (1000 units) tablet Take 1 tablet (25 mcg) by mouth daily. 90 tablet 3       ALLERGIES  Allergies   Allergen Reactions     Bee Pollen Swelling     Seasonal     Adhesive Tape Rash     Amoxicillin Rash     Chlorhexidine Rash     After several weeks, started to develop rash on R neck down to chest and arm. Also noted on back of knees. Providers suggesting related to CHG as nothing else is new for pt.      Folic Acid Itching     Liquid Adhesive Rash     Meloxicam Rash     Nabumetone GI Disturbance     GI upset     Pollen Extract      Seasonal       REVIEW OF  SYSTEMS  A complete ROS was performed and was negative except as mentioned in HPI    PHYSICAL EXAM  LMP 01/01/2007 (Approximate)   @LASTSAO2(4)@  Wt Readings from Last 3 Encounters:   06/26/25 81.3 kg (179 lb 3.2 oz)   06/25/25 80.1 kg (176 lb 9.6 oz)   06/19/25 80.7 kg (178 lb)     Most Recent 3 CBC's:  Recent Labs   Lab Test 07/09/25  0849 07/07/25  0806 07/03/25  0847   WBC 7.0 6.1 6.9   HGB 8.9* 8.8* 8.1*   MCV 98 98 95    216 146*   ANEU 3.5 2.8 4.1     Most Recent 3 BMP's:  Recent Labs   Lab Test 07/09/25  0849 07/07/25  0806 07/03/25  0847    139 141   POTASSIUM 3.2* 3.6 3.7   CHLORIDE 103 102 102   CO2 23 25 27   BUN 7.3 6.8 12.1   CR 0.70 0.62 0.62   ANIONGAP 15 12 12   TOBIN 9.4 9.1 9.4   * 114* 113*   PROTTOTAL 7.0 6.9 6.7   ALBUMIN 4.0 3.8 3.7    Most Recent 3 LFT's:  Recent Labs   Lab Test 07/09/25  0849 07/07/25  0806 07/03/25  0847   AST 20 20 19   ALT 10 12 12   ALKPHOS 114 116 124   BILITOTAL 0.4 0.4 0.3    Most Recent 2 TSH and T4:No lab results found.  I reviewed the above labs today.    GENERAL: alert and no distress  EYES: Eyes grossly normal to inspection.  No discharge or erythema, or obvious scleral/conjunctival abnormalities.  RESP: No audible wheeze, cough, or visible cyanosis.    SKIN: Visible skin clear. No significant rash, abnormal pigmentation or lesions.  NEURO: Cranial nerves grossly intact.  Mentation and speech appropriate for age.  PSYCH: Appropriate affect, tone, and pace of words    LABORATORY AND IMAGING STUDIES        BMBx 6/6/25  Final Diagnosis   Bone marrow, posterior iliac crest, right decalcified trephine biopsy, touch imprint, particle crush, direct aspirate smear, concentrated aspirate smear, and peripheral blood smear:     -No morphologic or immunophenotypic evidence of acute myeloid leukemia  -Hypercellular marrow for age (cellularity estimated at 60-70%) with trilineage hematopoietic maturation, no overt dysplasia, and no increase in  blasts  -Peripheral blood showing slight hypochromic, normocytic anemia; lymphopenia and monocytosis; slight thrombocytosis  -See comment   Electronically signed by Anahi Burns MD on 6/10/2025 at 1214 CDT   Comment  UUMAMARCE   Concurrent flow cytometry ([id number]) showed No increase in myeloid blasts and no abnormal myeloid blast population     Given the genetic profile of leukemia at the time of diagnosis with NPM1, IDH2 and NRAS mutations, correlation with the results of ancillary tests and clinical findings is recommended for further assessment of residual disease.      Concurrent ancillary studies are in progress and will be reported separately.  Correlation with the results of ancillary tests and clinical findings is recommended.     The red blood cell morphology may not be representative for this patient due to recent red blood cell transfusion.              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 She presents to establish care.    # AML w/ mutated NPM1 in remission - Normal cytogenetics with NPM1, IDH2 and NRAS mutations. MRD negative by NGS on D28 marrow and now s/p 2 cycles of HiDAC consolidation. Her primary oncologist is Dr. Samina Harris at [facility].     She has now tolerated 3 cycles HiDAC well without significant complications other than brief neutropenic fever. Plan for hopefully 4 cycles and if remains MRD negative can monitor for releapse peripherally.     - Neutropenic 6/19. Had previously been on levofloxacin, given Qtc of ~ 487 in May started cefpodoxime 200 mg BID instead for abx coverage. Patient was seen virtually so unable to get updated Qtc. Otherwise advised Alejandra to continue posa and acyclovir.      Ppx - Cefpodoxime, Posa,  ACV     Plan:   - Proceed with admission today for C4 of HiDAC.   - Will need potassium today - did not take her K this morning.   - Continue 3x weekly labs w PRN transfusions  - Please ensure neulasta scheduled  - Post hospital follow up a week after discharge.   - I am sending a message to Dr. Griffiths to discuss post C4 HiDAC plans. Will request he see her in a month for follow up.       The longitudinal plan of care for the diagnosis(es)/condition(s) as documented were addressed during this visit. Due to the added complexity in care, I will continue to support Alejandra in the subsequent management and with ongoing continuity of care.    AMARILIS Vick CNP  [facility] [place] [id number] [phone]        Again, thank you for allowing me to participate in the care of your patient.        Sincerely,        AMARILIS Jha CNP    Electronically signed

## 2025-07-09 NOTE — NURSING NOTE
"Oncology Rooming Note    July 9, 2025 8:56 AM   Alejandra Villegas is a 59 year old female who presents for:    Chief Complaint   Patient presents with    Oncology Clinic Visit     Acute myeloid leukemia not having achieved remission    Blood Draw     Labs drawn via  by RN in lab, vitals taken.      Initial Vitals: /81 (BP Location: Right arm, Patient Position: Sitting, Cuff Size: Adult Regular)   Pulse 83   Temp 98.4  F (36.9  C) (Oral)   Resp 20   Wt 81.6 kg (180 lb)   LMP 01/01/2007 (Approximate)   SpO2 94%   BMI 29.05 kg/m   Estimated body mass index is 29.05 kg/m  as calculated from the following:    Height as of 6/25/25: 1.676 m (5' 6\").    Weight as of this encounter: 81.6 kg (180 lb). Body surface area is 1.95 meters squared.  Moderate Pain (6) Comment: Data Unavailable   Patient's last menstrual period was 01/01/2007 (approximate).  Allergies reviewed: Yes  Medications reviewed: Yes    Medications: MEDICATION REFILLS NEEDED TODAY. Provider was notified.  Pharmacy name entered into EPIC:    [id number] - [place], [place] - [address] [id number] - [place], [place] - [address]    Frailty Screening:   Is the patient here for a new oncology consult visit in cancer care? 2. No    PHQ9:  Did this patient require a PHQ9?: No      Clinical concerns: pt needs Carafate refilled      Ruddy Kaur              "

## 2025-07-09 NOTE — PHARMACY-ADMISSION MEDICATION HISTORY
Pharmacist Admission Medication History    Admission medication history is complete. The information provided in this note is only as accurate as the sources available at the time of the update.    Information Source(s): Spoke to patient; Reviewed recent admission; Reviewed medication dispense history ([employer])     Changes made to PTA medication list:  Added: None  Deleted: None  Changed: None    Prior to Admission medications    Medication Sig Last Dose Taking? Auth Provider Long Term End Date   acyclovir (ZOVIRAX) 400 MG tablet Take 1 tablet (400 mg) by mouth 2 times daily. 7/9/2025 Morning Yes Bon Lezama DO Yes    budesonide-formoterol (SYMBICORT) 80-4.5 MCG/ACT Inhaler Inhale 2 puffs into the lungs 2 times daily - Rinse mouth well after use to prevent Thrush 7/9/2025 Morning Yes Darin Morin MD Yes    cyanocobalamin (VITAMIN B-12) 500 MCG tablet Take 500 mcg by mouth daily. 7/9/2025 Morning Yes Unknown, Entered By History     cyclobenzaprine (FLEXERIL) 10 MG tablet Take 1 tablet (10 mg) by mouth 3 times daily as needed for muscle spasms. 7/9/2025 Morning Yes Bon Lezama DO No    famotidine (PEPCID) 20 MG tablet Take 1 tablet (20 mg) by mouth 2 times daily. 7/9/2025 Morning Yes Bon Lezama DO     fluticasone (FLONASE) 50 MCG/ACT nasal spray Spray 2 sprays into both nostrils 2 times daily. Past Month Yes Bon Lezama DO     gabapentin (NEURONTIN) 400 MG capsule Take 1 capsule (400 mg) by mouth 3 times daily. 7/9/2025 Morning Yes Bon Lezama DO Yes    lisinopril (ZESTRIL) 10 MG tablet Take 1 tablet (10 mg) by mouth daily. 7/9/2025 Morning Yes Bon Lezama DO Yes    loratadine (CLARITIN) 10 MG tablet Take 1 tablet (10 mg) by mouth daily. 7/9/2025 Morning Yes Bon Lezama DO     pantoprazole (PROTONIX) 40 MG EC tablet Take 1 tablet (40 mg) by mouth 2 times daily (before meals). 7/9/2025 Morning Yes Bon Lezama DO No    PARoxetine (PAXIL) 20  MG tablet Take 1 tablet (20 mg) by mouth every morning. 7/9/2025 Morning Yes Bon Lezama DO Yes    posaconazole (NOXAFIL) 100 MG DR tablet Take 3 tablets (300 mg) by mouth every morning. 7/9/2025 Morning Yes Bon Lezama DO No    potassium chloride ER (K-TAB) 20 MEQ CR tablet Take 1 tablet (20 mEq) by mouth daily. 7/8/2025 Morning Yes Bon Lezama DO No    prochlorperazine (COMPAZINE) 5 MG tablet Take 1 tablet (5 mg) by mouth every 6 hours as needed for nausea or vomiting. Unknown Yes Bon Lezama DO     rosuvastatin (CRESTOR) 20 MG tablet Take 1 tablet (20 mg) by mouth daily. Past Week Yes Bon Lezama DO Yes    sucralfate (CARAFATE) 1 GM/10ML suspension Take 10 mLs (1 g) by mouth 4 times daily (before meals and nightly). 7/9/2025 Morning Yes Nery Crowell PA-C No    Vitamin D3 (CHOLECALCIFEROL) 25 mcg (1000 units) tablet Take 1 tablet (25 mcg) by mouth daily. 7/9/2025 Morning Yes Bon Lezama DO No    acetaminophen (TYLENOL) 325 MG tablet Take 2 tablets (650 mg) by mouth every 4 hours as needed for mild pain. As needed   Margarita Meier PA-C No    albuterol (PROAIR HFA/PROVENTIL HFA/VENTOLIN HFA) 108 (90 Base) MCG/ACT inhaler Inhale 1-2 puffs into the lungs 4 times daily as needed (Refractory bronchospasm associated with hypersensitivity reaction). As needed   Bon Lezama DO Yes    albuterol (PROVENTIL) (2.5 MG/3ML) 0.083% neb solution Take 1 vial (2.5 mg) by nebulization 4 times daily as needed (Refractory bronchospasm associated with hypersensitivity reaction). As needed   Margarita Meier PA-C Yes    calcium carbonate (TUMS) 500 MG chewable tablet Take 1 tablet (500 mg) by mouth 3 times daily as needed for heartburn. As needed   Chelsie Davila PA-C     hydrOXYzine HCl (ATARAX) 25 MG tablet Take 1-2 tablets (25-50 mg) by mouth every 6 hours as needed for anxiety or itching (adjuvant pain, sleep). As needed   Chelsie Davila  DESEAN Dillard     ondansetron (ZOFRAN ODT) 4 MG ODT tab Take 1-2 tablets (4-8 mg) by mouth every 8 hours as needed for nausea or vomiting. As needed   Bon Lezama DO     SUMAtriptan (IMITREX) 50 MG tablet Take 1 tablet (50 mg) by mouth at onset of headache for migraine. As needed   Margarita Meier PA-C No      Medication History Completed By: Topher Almanzar RPH 7/9/2025 10:30 AM

## 2025-07-09 NOTE — NURSING NOTE
Chief Complaint   Patient presents with    Oncology Clinic Visit     Acute myeloid leukemia not having achieved remission    Blood Draw     Labs drawn via  by RN in lab, vitals taken.      Labs collected from venipuncture by RN. Vitals taken. Checked in for appointment(s).    Vanna Rodríguez RN

## 2025-07-09 NOTE — H&P
[facility], [place]    History & Physical  Hematology / Oncology     Date of Admission:  7/9/25  Date of Service (when I saw the patient):  07/09/2025    Assessment & Plan   Alejandra Villegas is a 59 year old female with a past medical history significant for COPD, migraines, rheumatoid arthritis, aortic stenosis, anxiety, and recent diagnosis of AML who is admitted for scheduled consolidation chemotherapy with HiDAC (C4D1=7/9/25).      HEME  # AML with NPM1 mutation  Had been seen by PCP Dec 2024 w/ progressive fatigue and nausea where she was found leukopenic WBC  2.4 and neutropenic w/ ANC 0.3. Hgb 12. Was referred to local hematology/oncology clinic for further evaluation and seen by Dr. Samina Harris. BMBx 2/10/25 done at OSH showed hypercellular marrow w/ trilineage hematopoiesis w/ dyspoiesis with mildly elevated blasts of 4% on morphology. NGS w/ NPM1, IDH2, and NRAS mutations. She was admitted to [facility] 2/26/25 for further workup and management. Slides were re-reviewed by [facility] Hematopathology, who noted hypercellular marrow with 8% blasts by morphology. Despite relatively low blast percentage, findings were felt most consistent with a diagnosis AML with NPM1 mutation by WHO 2022 (which does not require a specific blast threshold). Following interdepartmental discussions and review of the available literature, patient was started on intensive induction with 7+3 (Day 1=3/5/25). Midcycle BMBx 3/19/25 with no morphologic or immunophenotypic evidence of AML. D28 BMBx completed 3/31/25 with hypercellular marrow (60 to 70%) with no overt dysplasia or increase in blasts, noted flow with 4.3% blasts of unusual phenotype; MRD- by NGS. Due to overall disposition timeline as well as patient living in [facility], preceded directly with consolidation chemotherapy with HiDAC (C1D1=4/3/25) which she tolerated well. Completed C2 HiDAC (C2D1=5/6/2025) without complications. Bone marrow  biopsy completed s/p 2 cycles of HiDAC on 6/6/25 with no evidence of persistent AML; MRD- by NGS. Completed C3 HiDAC (C3D1=6/11/25) without complications. Now admitted for C4 HiDAC (C4D1=7/9/25).  - PICC placed upon admission, plan to remove prior to discharge  - Baseline cardiopulmonary studies:  - Echo w/ EF 60-65%, mild known aortic stenosis.  - EKG (2/27) with NSR, QTc 480  - CXR (4/27) Small area of new infiltrate versus atelectasis left lung base. No consolidation. R lung clear.  - Baseline viral serologies: CMV IgG+, EBV IgG+, HepB sAg-, HepB cAb-, HepB sAb-, HSV1+/2+, HIV-  - HLA Typing sent on 3/3/2025 and 4/25/2025. BMT Consult on 4/16, no immediate plans for bone marrow transplant. Patient endorses preference to avoid transplant.   - PICC placed on admission, anticipate removal upon discharge.      Treatment plan: HiDAC (C4D1=7/9/25)  - Cytarabine 3 g/m  IV q12h D1-3   - Neulasta 6 mg D5 - to be administered at [facility] clinic, will need to request  Supportive medications: Zofran 8 mg q12h, Dexamethasone 4 mg q12h D1-3, Prednisolone eye drops QID D1-5     # Anemia  # Risk for pancytopenia  Secondary to underlying hematologic malignancy and exacerbated by recent chemotherapy. Historical pancytopenia has since recovered.  - Follow daily CBC  - Transfuse to keep Hgb >7, plt >10K  - Okay to transfuse using blood consent on file from 6/27/25 (signed 6/25/25). Indication for transfusion remains the same.     ID   # ID prophylaxis  - Acyclovir 400 mg BID  - Cefpodoxime 200 mg BID when ANC <1.0; ANC at admission 3.3.   - Posaconazole 300 mg daily - continue given smoking history   - Note: Posaconazole copay $1.60. Trialed initially on vori, then rotated to posa x3/18 given visual hallucinations     # Recent sepsis with encephalopathy without septic shock  She presented for a RBC infusion at [facility] and on arrival she was febrile at 102.5 with altered mental status. Sepsis protocol initiated. Platelets at  49, hemoglobin at 6.0, WBC at 9.3. CT head negative. CXR with interstitial thickening, possible pneumonia. Treated with Vancomycin, Cefepime. Blood cultures remained NGTD. She remained afebrile and AMS resolved within ~2 hours. Received blood product transfusions and tolerated well. Admitted for ~24 hrs. Discharged on Levaquin 750 mg daily x 5 days.   - Upon admission, no recurrent fevers or altered mental status per patient.      GI  # GERD  # History of intermittent epigastric pain  CT C/A/P 4/14/2025 with evidence of esophagitis (thought due to reflux/recent chemotherapy) and small hiatal hernia. Symptoms have historically improved on maximal medical therapy as below.  - Continue PTA Protonix BID, Pepcid BID, Carafate QID, TUMS PRN     PULM  # COPD  Longstanding history of COPD. On admission patient reports symptoms are manageable with no recent exacerbations. Most recent PFTs (2018) were normal.  - Continue PTA Symbicort inhaler and PRN DuoNebs   - Encourage smoking cessation     CV  # Essential hypertension  Previously on lisinopril 10 mg daily, which was held due to well-managed blood pressures during prior admission. Resumed x6/12 with recent elevated readings.  - Continue PTA Lisinopril 10 mg daily   - Trend BPs     # Infrarenal abdominal aortic aneurysm  Noted incidentally on CT C/A/P (3/19/25), measuring 3.4 cm in diameter.  - Attention on follow-up imaging     RENAL/FEN  # Stage 2 CKD  Baseline Cr ~ 0.7. On admission Cr 0.75.  - Continue to trend on daily labs and encourage PO hydration     NEURO  # Chronic migraine w/o aura  # Chronic headaches  Present since childhood. Reportedly triggered by neck manipulation/movement. Reportedly well-managed with regimen below. Headaches occurring throughout admission with daily APAP use, also chronic and managed with APAP PRN at home. Patient notes that her headaches throughout prior hospitalizations have been consistent with her typical daily headaches with no  reported changes in character, quality, location, severity, or frequency. She denies new associated neurological changes. May consider LP to exclude CNS leukemia, but given this reassuring history and absence of other indications for LP (no hyperleukocytosis, no monocytic phenotype, no FLT3 mutation, etc), this was historically deferred.  - APAP PRN     MISC   # BROOKS  # MDD  # At risk for adjustment disorder  - Continue PTA paroxetine, Atarax PRN     # Tobacco use disorder  Has smoked since age 16, 1.5 ppd (roughly 65 pack years). Attempting to cut back as recently as 01/2025 to 0.5 ppd. We discussed the risks of smoking during induction chemotherapy including increased risk for infection in setting of severe neutropenia that could further complicate course, placing her at risk for severe complications that could be life-threatening or even fatal. Trialed nicotine patch, then self-discontinued after reporting that she felt the nicotine patch precipitated an anxiety attack/episode of chest pain on 3/9. Has been offered a lower dose versus alternative form of NRT, which patient declined.  - Encourage smoking cessation; patient continues to smoke at this time, despite understanding/acknowledgement of the risks. Reports trying to cut down.  - Continue posaconazole ppx, regardless of ANC, given high risk for fungal pulmonary infection in the setting of ongoing tobacco abuse.     # Degenerative disc disease  Appears likely multi-level; no MRI available for review, though note that patient has previously had epidural injections at L3-4 and L5-S1 (2016). Reported taking gabapentin 400 mg TID PRN prior to admission. Mild exacerbation noted 3/29; improved with resumption of gabapentin.  - Continue gabapentin 400 mg TID     # Deconditioning  Using cane regularly for ambulatory assistance.  - Consider PT consult while inpatient     Chronic Problems:  # Vitamin D deficiency - Continue PTA vit D supplement  # Seasonal allergies -  Claritin 10 mg daily  # Rheumatoid arthritis - Patient reported trying treatment though was unable to tolerate well. Managed with Tylenol PRN. Most recent RF >650 (2/10/25). JI negative.    # Prediabetes - Last A1c 6.0 x12/9/24. Has been managing with lifestyle modifications.   # Mixed hyperlipidemia - Lipid panel has remained at goal, 1/14/25 panel w/ cholesterol 163, , LDL 92, HDL 45. - Held PTA rosuvastatin on admission  # H/o aortic stenosis - Patient noted on admission longstanding history of aortic stenosis. Pre-chemo echo w/ EF 60-65% and mild aortic stenosis and insufficiency. No previous echo to compare.     Fluids/Electrolytes/Nutrition:  -IVF per chemotherapy protocol  -Lyte replacement per protocol  -Regular diet as tolerated     Prophy/Misc:  -GI: PTA PPI, Pepcid  -Bowels: Senna/Miralax PRN  -DVT: Lovenox ppx     Disposition: Admit to hospital for scheduled chemotherapy. Discharge pending regimen completion and clinical tolerance, anticipate ~4 day admission.    Staffed with Dr. Bella.    Nery Crowell PA-C  Hematology/Oncology  Pager: [phone]    Code Status : Full Code    Primary Care Physician   Bon Lezama    History of Present Illness   History obtained from chart and discussed with the patient.    Alejandra Villegas is a 59 year old female with a past medical history significant for COPD, migraines, rheumatoid arthritis, aortic stenosis, anxiety, and recent diagnosis of AML who is admitted for scheduled consolidation chemotherapy with HiDAC (C4D1=7/9/25).     Upon admission, Alejandra is feeling well. She reports fatigue from her travel to the clinic as she had to wake up around 4 AM. Headaches are intermittent, unchanged from baseline, and remain responsive to Tylenol. Chronic back pain from herniated disc also at baseline. Denies worsening shortness of breath, chest pain, GERD, nausea/vomiting/diarrhea, abdominal pain, skin changes, visual disturbances, fevers, chills.  Chemotherapy teaching reviewed with patient, during which she endorsed historical familiarity. We discussed specific side effects of HiDAC, as well as the typical dosing schedule. Plan for outpatient follow up at [facility], which I will organize. All concerns were addressed and questions were answered.    Past Medical History    Past Medical History:   Diagnosis Date    Allergic rhinitis 09/27/2011         Disorder of kidney and ureter 08/08/2008    Kidney disease GFR 87    Dorsalgia     No Comments Provided    Generalized anxiety disorder     No Comments Provided    Hidradenitis suppurativa     No Comments Provided    Other disorders of lung (CODE) 08/01/2007    Pulmonary nodules, stable on follow-up chest CT 12/08.  No further imaging recommended.    Other specified disorders of Eustachian tube, unspecified ear 02/27/2012         Personal history of other medical treatment (CODE)     Childbirth x4    Rheumatoid arthritis (H) 02/27/2012         Tobacco use     No Comments Provided     Past Surgical History   Past Surgical History:   Procedure Laterality Date    ARTHROSCOPY KNEE  05/2017    Dr Torres    ARTHROSCOPY KNEE  07/20/2017    Dr Garza, lateral release, meniscal repair    ARTHROTOMY WRIST Left 2011    Dr. Torres    BONE MARROW BIOPSY, BONE SPECIMEN, NEEDLE/TROCAR Left 02/13/2025    Procedure: Bone marrow biopsy, bone specimen, needle/trocar;  Surgeon: Hema Mari MD;  Location:  OR    CHOLECYSTECTOMY  06/2007    Emergency tracheotomy following failed intubation for laparoscopic cholecystectomy.    DILATION AND CURETTAGE  09/2006         HERNIA REPAIR  2007    Incisoinal hernia repair    HYSTERECTOMY TOTAL ABDOMINAL  02/2010         IMPLANT STIMULATOR AND LEADS SACRAL NERVE (STAGE ONE AND TWO) N/A 12/11/2018    Procedure: Interstim Battery Replacement;  Surgeon: Rl Ambriz MD;  Location:  OR    INTERSTIM DEVICE STAGE 2  01/06/2014    Dr. Ambriz [facility]    LAPAROSCOPIC ABLATION ENDOMETRIOSIS   09/2006         OOPHORECTOMY Left 2010     Dr Thorpe    PICC DOUBLE LUMEN PLACEMENT Left 06/11/2025    medial brachial, 47 cm, 2 cm external length    PICC INSERTION - DOUBLE LUMEN Right 03/05/2025    5FR, DL, total cath length=42 CM, visible cath length= 3CM, brachial medial vein    PICC INSERTION - DOUBLE LUMEN Right 05/06/2025    44-3Ccm, Basilic vein    RECONSTRUCT FOREFOOT WITH METATARSOPHALANGEAL (MTP) FUSION Right 05/05/2022    Procedure: Right fibular sesmoid excision and 1st metatarsal phalangeal joint fusion;  Surgeon: Rl Nathan DPM;  Location: [facility] OR    RELEASE CARPAL TUNNEL  02/02/2017         RELEASE PLANTAR FASCIA Left 12/19/2024    Procedure: excision of soft tissue mass left foot,  gastroc recession;  Surgeon: Rl Nathan DPM;  Location: [facility] OR    REMOVE HARDWARE FOOT Right 08/10/2023    Procedure: REMOVAL, HARDWARE, FOOT;  Surgeon: Rl Nathan DPM;  Location: [facility] OR    SALPINGO OOPHORECTOMY,R/L/KELSEY Right 06/1999    Right salpingo-oophorectomy for hemorrhagic corpus luteum cyst    TRACHEOSTOMY  2007    emergency following failed intubation during cholecystectomy    TYMPANOSTOMY, LOCAL/TOPICAL ANESTHESIA  08/2006    PE tube placement     Prior to Admission Medications   Prior to Admission Medications   Prescriptions Last Dose Informant Patient Reported? Taking?   PARoxetine (PAXIL) 20 MG tablet 7/9/2025 Morning  No Yes   Sig: Take 1 tablet (20 mg) by mouth every morning.   SUMAtriptan (IMITREX) 50 MG tablet Unknown  No No   Sig: Take 1 tablet (50 mg) by mouth at onset of headache for migraine.   Vitamin D3 (CHOLECALCIFEROL) 25 mcg (1000 units) tablet 7/9/2025 Morning  No Yes   Sig: Take 1 tablet (25 mcg) by mouth daily.   acetaminophen (TYLENOL) 325 MG tablet Unknown  No No   Sig: Take 2 tablets (650 mg) by mouth every 4 hours as needed for mild pain.   acyclovir (ZOVIRAX) 400 MG tablet 7/9/2025 Morning  No Yes   Sig: Take 1 tablet (400 mg) by mouth 2 times daily.   albuterol (PROAIR  HFA/PROVENTIL HFA/VENTOLIN HFA) 108 (90 Base) MCG/ACT inhaler Unknown  No No   Sig: Inhale 1-2 puffs into the lungs 4 times daily as needed (Refractory bronchospasm associated with hypersensitivity reaction).   albuterol (PROVENTIL) (2.5 MG/3ML) 0.083% neb solution Unknown  No No   Sig: Take 1 vial (2.5 mg) by nebulization 4 times daily as needed (Refractory bronchospasm associated with hypersensitivity reaction).   budesonide-formoterol (SYMBICORT) 80-4.5 MCG/ACT Inhaler 7/9/2025 Morning  No Yes   Sig: Inhale 2 puffs into the lungs 2 times daily - Rinse mouth well after use to prevent Thrush   calcium carbonate (TUMS) 500 MG chewable tablet Unknown  No No   Sig: Take 1 tablet (500 mg) by mouth 3 times daily as needed for heartburn.   cyanocobalamin (VITAMIN B-12) 500 MCG tablet 7/9/2025 Morning  Yes Yes   Sig: Take 500 mcg by mouth daily.   cyclobenzaprine (FLEXERIL) 10 MG tablet 7/9/2025 Morning  No Yes   Sig: Take 1 tablet (10 mg) by mouth 3 times daily as needed for muscle spasms.   famotidine (PEPCID) 20 MG tablet 7/9/2025 Morning  No Yes   Sig: Take 1 tablet (20 mg) by mouth 2 times daily.   fluticasone (FLONASE) 50 MCG/ACT nasal spray Past Month  No Yes   Sig: Spray 2 sprays into both nostrils 2 times daily.   gabapentin (NEURONTIN) 400 MG capsule 7/9/2025 Morning  No Yes   Sig: Take 1 capsule (400 mg) by mouth 3 times daily.   hydrOXYzine HCl (ATARAX) 25 MG tablet Unknown  No No   Sig: Take 1-2 tablets (25-50 mg) by mouth every 6 hours as needed for anxiety or itching (adjuvant pain, sleep).   lisinopril (ZESTRIL) 10 MG tablet 7/9/2025 Morning  No Yes   Sig: Take 1 tablet (10 mg) by mouth daily.   loratadine (CLARITIN) 10 MG tablet 7/9/2025 Morning  No Yes   Sig: Take 1 tablet (10 mg) by mouth daily.   ondansetron (ZOFRAN ODT) 4 MG ODT tab Unknown  No No   Sig: Take 1-2 tablets (4-8 mg) by mouth every 8 hours as needed for nausea or vomiting.   order for DME   No No   Sig: Equipment being ordered: home neb  set up with mask and tubing   pantoprazole (PROTONIX) 40 MG EC tablet 7/9/2025 Morning  No Yes   Sig: Take 1 tablet (40 mg) by mouth 2 times daily (before meals).   posaconazole (NOXAFIL) 100 MG DR tablet 7/9/2025 Morning  No Yes   Sig: Take 3 tablets (300 mg) by mouth every morning.   potassium chloride ER (K-TAB) 20 MEQ CR tablet 7/8/2025  No Yes   Sig: Take 1 tablet (20 mEq) by mouth daily.   prochlorperazine (COMPAZINE) 5 MG tablet Unknown  No Yes   Sig: Take 1 tablet (5 mg) by mouth every 6 hours as needed for nausea or vomiting.   rosuvastatin (CRESTOR) 20 MG tablet Past Week  No Yes   Sig: Take 1 tablet (20 mg) by mouth daily.   sucralfate (CARAFATE) 1 GM/10ML suspension 7/9/2025 Morning  Yes Yes   Sig: Take 10 mLs (1 g) by mouth 4 times daily (before meals and nightly).      Facility-Administered Medications: None     Allergies   Allergies   Allergen Reactions    Bee Pollen Swelling     Seasonal    Adhesive Tape Rash    Amoxicillin Rash    Chlorhexidine Rash     After several weeks, started to develop rash on R neck down to chest and arm. Also noted on back of knees. Providers suggesting related to CHG as nothing else is new for pt.     Folic Acid Itching    Liquid Adhesive Rash    Meloxicam Rash    Nabumetone GI Disturbance     GI upset    Pollen Extract      Seasonal     Social History   Social History     Socioeconomic History    Marital status:      Spouse name: Not on file    Number of children: Not on file    Years of education: Not on file    Highest education level: Not on file   Occupational History    Not on file   Tobacco Use    Smoking status: Every Day     Current packs/day: 1.00     Average packs/day: 1 pack/day for 44.5 years (44.5 ttl pk-yrs)     Types: Cigarettes     Start date: 1981     Passive exposure: Past    Smokeless tobacco: Never    Tobacco comments:     Passive exposure in childhood and adult homes and some work places   Vaping Use    Vaping status: Never Used   Substance  and Sexual Activity    Alcohol use: No     Alcohol/week: 0.0 standard drinks of alcohol    Drug use: No    Sexual activity: Not Currently     Partners: Male   Other Topics Concern    Parent/sibling w/ CABG, MI or angioplasty before 65F 55M? Not Asked   Social History Narrative     four times and now . She is unemployed.  Lives with her oldest son(he lives with her).  4 sons, 1 son lives with her others are all in the area.     Social Drivers of Health     Financial Resource Strain: Low Risk  (6/25/2025)    Financial Resource Strain     Within the past 12 months, have you or your family members you live with been unable to get utilities (heat, electricity) when it was really needed?: No   Food Insecurity: Low Risk  (6/25/2025)    Food Insecurity     Within the past 12 months, did you worry that your food would run out before you got money to buy more?: No     Within the past 12 months, did the food you bought just not last and you didn t have money to get more?: No   Transportation Needs: Low Risk  (6/25/2025)    Transportation Needs     Within the past 12 months, has lack of transportation kept you from medical appointments, getting your medicines, non-medical meetings or appointments, work, or from getting things that you need?: No   Physical Activity: Not on file   Stress: Not on file   Social Connections: Not on file   Interpersonal Safety: Low Risk  (6/25/2025)    Interpersonal Safety     Do you feel physically and emotionally safe where you currently live?: Yes     Within the past 12 months, have you been hit, slapped, kicked or otherwise physically hurt by someone?: No     Within the past 12 months, have you been humiliated or emotionally abused in other ways by your partner or ex-partner?: No   Housing Stability: Low Risk  (6/25/2025)    Housing Stability     Do you have housing? : Yes     Are you worried about losing your housing?: No     Family History   Family History   Problem Relation Age  of Onset    Arthritis Mother         Arthritis,Rheumatoid    Cancer Mother         Cancer, lung and uterine cancer    Heart Disease Father         Heart Disease,CAD, CABG, peripheral vascular dise    Thyroid Disease Father         Thyroid Disease, hyperlipidemia    Other - See Comments Father         djd    Asthma Brother         Asthma    Asthma Sister         Asthma    Other - See Comments Sister         Psychiatric illness,Anxiety    Other - See Comments Son         x2 back surgeries    Breast Cancer No family hx of         Cancer-breast     Review of Systems   A 10 point ROS is negative unless otherwise noted above in the HPI.    Physical Exam   Vital Signs with Ranges  Temp:  [98.4  F (36.9  C)-98.5  F (36.9  C)] 98.5  F (36.9  C)  Pulse:  [83-88] 88  Resp:  [20] 20  BP: (138-140)/(81-88) 140/88  SpO2:  [94 %-98 %] 98 %  178 lbs 5.63 oz    Constitutional: Pleasant and cooperative female, lounging in bed. Awake, alert, NAD.  HEENT: NC/AT, EOMI, sclera clear, conjunctiva normal, OP with MMM. Poor dentition.  Respiratory: No increased work of breathing, though occasionally stops mid-sentence to take an additional breath. CTAB, no crackles or wheezing.  Cardiovascular: RRR, no murmur noted. No peripheral edema.  GI: Normal bowel sounds, soft, non-distended and non-tender.  Skin: Warm, dry, well-perfused. No bruising, bleeding, rashes, or lesions on limited exam. Scatter tattoos and scars on upper extremities.  Neurologic: A&O. Answers questions appropriately. Moves all extremities spontaneously.  Neuropsychiatric: Calm, smiling, appropriate affect.    Recent Labs  CBC   Recent Labs   Lab 07/09/25  1059 07/09/25  0849 07/07/25  0806 07/03/25  0847   WBC 6.8 7.0 6.1 6.9   RBC 2.89* 2.79* 2.77* 2.62*   HGB 9.2* 8.9* 8.8* 8.1*   HCT 28.8* 27.4* 27.1* 25.0*    98 98 95   MCH 31.8 31.9 31.8 30.9   MCHC 31.9 32.5 32.5 32.4   RDW 22.1* 21.8* 21.2* 18.6*    233 216 146*     CMP   Recent Labs   Lab  07/09/25  1059 07/09/25  0849 07/07/25  0806 07/03/25  0847    141 139 141   POTASSIUM 3.6 3.2* 3.6 3.7   CHLORIDE 102 103 102 102   CO2 25 23 25 27   ANIONGAP 13 15 12 12   * 170* 114* 113*   BUN 7.9 7.3 6.8 12.1   CR 0.75 0.70 0.62 0.62   GFRESTIMATED >90 >90 >90 >90   TOBIN 9.6 9.4 9.1 9.4   MAG 2.2  --   --   --    PHOS 4.5  --   --   --    PROTTOTAL 7.2 7.0 6.9 6.7   ALBUMIN 4.0 4.0 3.8 3.7   BILITOTAL 0.4 0.4 0.4 0.3   ALKPHOS 111 114 116 124   AST 22 20 20 19   ALT 11 10 12 12     LFTs:   Recent Labs   Lab 07/09/25  1059   PROTTOTAL 7.2   ALBUMIN 4.0   BILITOTAL 0.4   ALKPHOS 111   AST 22   ALT 11     Coagulation Studies: No lab results found in last 7 days.

## 2025-07-10 VITALS
OXYGEN SATURATION: 97 % | TEMPERATURE: 97.7 F | SYSTOLIC BLOOD PRESSURE: 134 MMHG | DIASTOLIC BLOOD PRESSURE: 87 MMHG | BODY MASS INDEX: 29.74 KG/M2 | HEIGHT: 65 IN | WEIGHT: 178.5 LBS | RESPIRATION RATE: 18 BRPM | HEART RATE: 82 BPM

## 2025-07-10 DIAGNOSIS — K21.9 GASTROESOPHAGEAL REFLUX DISEASE, UNSPECIFIED WHETHER ESOPHAGITIS PRESENT: ICD-10-CM

## 2025-07-10 LAB
ABO + RH BLD: NORMAL
ALBUMIN SERPL BCG-MCNC: 4 G/DL (ref 3.5–5.2)
ALP SERPL-CCNC: 101 U/L (ref 40–150)
ALT SERPL W P-5'-P-CCNC: 11 U/L (ref 0–50)
ANION GAP SERPL CALCULATED.3IONS-SCNC: 11 MMOL/L (ref 7–15)
AST SERPL W P-5'-P-CCNC: 19 U/L (ref 0–45)
BASOPHILS # BLD AUTO: 0 10E3/UL (ref 0–0.2)
BASOPHILS NFR BLD AUTO: 0 %
BILIRUB SERPL-MCNC: 0.4 MG/DL
BLD GP AB SCN SERPL QL: NEGATIVE
BUN SERPL-MCNC: 14.9 MG/DL (ref 6–20)
CALCIUM SERPL-MCNC: 9.6 MG/DL (ref 8.8–10.4)
CHLORIDE SERPL-SCNC: 106 MMOL/L (ref 98–107)
CREAT SERPL-MCNC: 0.58 MG/DL (ref 0.51–0.95)
EGFRCR SERPLBLD CKD-EPI 2021: >90 ML/MIN/1.73M2
EOSINOPHIL # BLD AUTO: 0 10E3/UL (ref 0–0.7)
EOSINOPHIL NFR BLD AUTO: 0 %
ERYTHROCYTE [DISTWIDTH] IN BLOOD BY AUTOMATED COUNT: 21.2 % (ref 10–15)
GLUCOSE SERPL-MCNC: 166 MG/DL (ref 70–99)
HCO3 SERPL-SCNC: 25 MMOL/L (ref 22–29)
HCT VFR BLD AUTO: 29.7 % (ref 35–47)
HGB BLD-MCNC: 9.2 G/DL (ref 11.7–15.7)
IMM GRANULOCYTES # BLD: 0 10E3/UL
IMM GRANULOCYTES NFR BLD: 1 %
LDH SERPL L TO P-CCNC: 333 U/L (ref 0–250)
LYMPHOCYTES # BLD AUTO: 0.3 10E3/UL (ref 0.8–5.3)
LYMPHOCYTES NFR BLD AUTO: 9 %
MAGNESIUM SERPL-MCNC: 2.3 MG/DL (ref 1.7–2.3)
MCH RBC QN AUTO: 31.3 PG (ref 26.5–33)
MCHC RBC AUTO-ENTMCNC: 31 G/DL (ref 31.5–36.5)
MCV RBC AUTO: 101 FL (ref 78–100)
MONOCYTES # BLD AUTO: 0.3 10E3/UL (ref 0–1.3)
MONOCYTES NFR BLD AUTO: 9 %
NEUTROPHILS # BLD AUTO: 3.2 10E3/UL (ref 1.6–8.3)
NEUTROPHILS NFR BLD AUTO: 82 %
NRBC # BLD AUTO: 0 10E3/UL
NRBC BLD AUTO-RTO: 1 /100
PHOSPHATE SERPL-MCNC: 3.8 MG/DL (ref 2.5–4.5)
PLATELET # BLD AUTO: 259 10E3/UL (ref 150–450)
POTASSIUM SERPL-SCNC: 4.5 MMOL/L (ref 3.4–5.3)
PROT SERPL-MCNC: 6.9 G/DL (ref 6.4–8.3)
RBC # BLD AUTO: 2.94 10E6/UL (ref 3.8–5.2)
SODIUM SERPL-SCNC: 142 MMOL/L (ref 135–145)
SPECIMEN EXP DATE BLD: NORMAL
URATE SERPL-MCNC: 5.6 MG/DL (ref 2.4–5.7)
WBC # BLD AUTO: 3.9 10E3/UL (ref 4–11)

## 2025-07-10 PROCEDURE — 120N000002 HC R&B MED SURG/OB UMMC

## 2025-07-10 PROCEDURE — 83615 LACTATE (LD) (LDH) ENZYME: CPT

## 2025-07-10 PROCEDURE — 250N000011 HC RX IP 250 OP 636

## 2025-07-10 PROCEDURE — 84155 ASSAY OF PROTEIN SERUM: CPT

## 2025-07-10 PROCEDURE — 99418 PROLNG IP/OBS E/M EA 15 MIN: CPT

## 2025-07-10 PROCEDURE — 99233 SBSQ HOSP IP/OBS HIGH 50: CPT | Mod: FS

## 2025-07-10 PROCEDURE — 83735 ASSAY OF MAGNESIUM: CPT

## 2025-07-10 PROCEDURE — 250N000013 HC RX MED GY IP 250 OP 250 PS 637

## 2025-07-10 PROCEDURE — 85004 AUTOMATED DIFF WBC COUNT: CPT

## 2025-07-10 PROCEDURE — 84100 ASSAY OF PHOSPHORUS: CPT

## 2025-07-10 PROCEDURE — 250N000012 HC RX MED GY IP 250 OP 636 PS 637

## 2025-07-10 PROCEDURE — 84550 ASSAY OF BLOOD/URIC ACID: CPT

## 2025-07-10 PROCEDURE — 86900 BLOOD TYPING SEROLOGIC ABO: CPT

## 2025-07-10 PROCEDURE — 258N000003 HC RX IP 258 OP 636

## 2025-07-10 RX ADMIN — DEXAMETHASONE 4 MG: 4 TABLET ORAL at 17:04

## 2025-07-10 RX ADMIN — POSACONAZOLE 300 MG: 100 TABLET, DELAYED RELEASE ORAL at 09:39

## 2025-07-10 RX ADMIN — PREDNISOLONE ACETATE 2 DROP: 10 SUSPENSION/ DROPS OPHTHALMIC at 09:40

## 2025-07-10 RX ADMIN — SUCRALFATE 1 G: 1 SUSPENSION ORAL at 12:48

## 2025-07-10 RX ADMIN — PANTOPRAZOLE SODIUM 40 MG: 40 TABLET, DELAYED RELEASE ORAL at 09:41

## 2025-07-10 RX ADMIN — BUDESONIDE AND FORMOTEROL FUMARATE DIHYDRATE 2 PUFF: 80; 4.5 AEROSOL RESPIRATORY (INHALATION) at 21:18

## 2025-07-10 RX ADMIN — PREDNISOLONE ACETATE 2 DROP: 10 SUSPENSION/ DROPS OPHTHALMIC at 21:24

## 2025-07-10 RX ADMIN — CYTARABINE 5730 MG: 100 INJECTION, SOLUTION INTRATHECAL; INTRAVENOUS; SUBCUTANEOUS at 17:35

## 2025-07-10 RX ADMIN — ACETAMINOPHEN 650 MG: 325 TABLET ORAL at 15:56

## 2025-07-10 RX ADMIN — ACYCLOVIR 400 MG: 400 TABLET ORAL at 21:17

## 2025-07-10 RX ADMIN — DEXAMETHASONE 4 MG: 4 TABLET ORAL at 04:47

## 2025-07-10 RX ADMIN — Medication 5 ML: at 09:25

## 2025-07-10 RX ADMIN — Medication 5 ML: at 21:14

## 2025-07-10 RX ADMIN — BUDESONIDE AND FORMOTEROL FUMARATE DIHYDRATE 2 PUFF: 80; 4.5 AEROSOL RESPIRATORY (INHALATION) at 09:40

## 2025-07-10 RX ADMIN — ONDANSETRON HYDROCHLORIDE 8 MG: 8 TABLET, FILM COATED ORAL at 04:47

## 2025-07-10 RX ADMIN — FAMOTIDINE 20 MG: 20 TABLET, FILM COATED ORAL at 21:17

## 2025-07-10 RX ADMIN — Medication 25 MCG: at 09:41

## 2025-07-10 RX ADMIN — SUCRALFATE 1 G: 1 SUSPENSION ORAL at 09:39

## 2025-07-10 RX ADMIN — PREDNISOLONE ACETATE 2 DROP: 10 SUSPENSION/ DROPS OPHTHALMIC at 12:45

## 2025-07-10 RX ADMIN — LISINOPRIL 10 MG: 10 TABLET ORAL at 09:41

## 2025-07-10 RX ADMIN — ACYCLOVIR 400 MG: 400 TABLET ORAL at 09:42

## 2025-07-10 RX ADMIN — POTASSIUM CHLORIDE 20 MEQ: 750 TABLET, EXTENDED RELEASE ORAL at 09:41

## 2025-07-10 RX ADMIN — PAROXETINE HYDROCHLORIDE 20 MG: 20 TABLET, FILM COATED ORAL at 09:42

## 2025-07-10 RX ADMIN — ONDANSETRON HYDROCHLORIDE 8 MG: 8 TABLET, FILM COATED ORAL at 17:04

## 2025-07-10 RX ADMIN — CYANOCOBALAMIN TAB 500 MCG 500 MCG: 500 TAB at 09:41

## 2025-07-10 RX ADMIN — ENOXAPARIN SODIUM 40 MG: 40 INJECTION SUBCUTANEOUS at 21:17

## 2025-07-10 RX ADMIN — SUCRALFATE 1 G: 1 SUSPENSION ORAL at 21:16

## 2025-07-10 RX ADMIN — SUCRALFATE 1 G: 1 SUSPENSION ORAL at 15:55

## 2025-07-10 RX ADMIN — GABAPENTIN 400 MG: 300 CAPSULE ORAL at 09:40

## 2025-07-10 RX ADMIN — CYTARABINE 5730 MG: 100 INJECTION, SOLUTION INTRATHECAL; INTRAVENOUS; SUBCUTANEOUS at 05:26

## 2025-07-10 RX ADMIN — GABAPENTIN 400 MG: 300 CAPSULE ORAL at 21:17

## 2025-07-10 RX ADMIN — LORATADINE 10 MG: 10 TABLET ORAL at 09:41

## 2025-07-10 RX ADMIN — PANTOPRAZOLE SODIUM 40 MG: 40 TABLET, DELAYED RELEASE ORAL at 15:55

## 2025-07-10 RX ADMIN — FAMOTIDINE 20 MG: 20 TABLET, FILM COATED ORAL at 09:42

## 2025-07-10 RX ADMIN — GABAPENTIN 400 MG: 300 CAPSULE ORAL at 15:56

## 2025-07-10 RX ADMIN — PREDNISOLONE ACETATE 2 DROP: 10 SUSPENSION/ DROPS OPHTHALMIC at 15:58

## 2025-07-10 ASSESSMENT — ACTIVITIES OF DAILY LIVING (ADL)
ADLS_ACUITY_SCORE: 38
ADLS_ACUITY_SCORE: 40
ADLS_ACUITY_SCORE: 38
ADLS_ACUITY_SCORE: 40
ADLS_ACUITY_SCORE: 38
ADLS_ACUITY_SCORE: 40
ADLS_ACUITY_SCORE: 38
ADLS_ACUITY_SCORE: 38
ADLS_ACUITY_SCORE: 40
ADLS_ACUITY_SCORE: 38
DEPENDENT_IADLS:: INDEPENDENT
ADLS_ACUITY_SCORE: 38
ADLS_ACUITY_SCORE: 40
ADLS_ACUITY_SCORE: 38
ADLS_ACUITY_SCORE: 38
ADLS_ACUITY_SCORE: 40
ADLS_ACUITY_SCORE: 38
ADLS_ACUITY_SCORE: 38
ADLS_ACUITY_SCORE: 40

## 2025-07-10 NOTE — PLAN OF CARE
Goal Outcome Evaluation:      Plan of Care Reviewed With: patient    Overall Patient Progress: no changeOverall Patient Progress: no change    Outcome Evaluation: Cycle 4 Dose 2 completed this shift.    Patient quite sleepy this morning but woke up for morning meds at 0945. Voiding adequately; LBM was this morning. No complaints of pain or nausea. Patient ambulates halls frequently & goes outside to smoke. Minimal oxygen needed while sleeping (history of COPD & declines to quit smoking). No need for supplemental oxygen unless patient is either symptomatic or <88% on room air due to known COPD. Patient has a good appetite so far this admission.

## 2025-07-10 NOTE — PLAN OF CARE
"Goal Outcome Evaluation:      Plan of Care Reviewed With: patient    Overall Patient Progress: no change      Shift: [id number]    Blood pressure (!) 154/87, pulse 76, temperature 97.6  F (36.4  C), temperature source Oral, resp. rate 20, height 1.638 m (5' 4.5\"), weight 80.9 kg (178 lb 5.6 oz), last menstrual period 01/01/2007, SpO2 96%, not currently breastfeeding.      Reason for admission:  Past medical history significant for COPD, migraines, rheumatoid arthritis, aortic stenosis, anxiety, and recent diagnosis of AML who is admitted for scheduled consolidation chemotherapy with HiDAC (C4D1=7/9/25)   Team: Heme Onc   Iso: CP- exposed  Pain: denies   Neuro: Alert and oriented x 4             Cardiac: Hypertensive but within parameters       Respiratory: On 4L NC; sating >92%. Infrequent cough  Labs: Mag, Phos, K+ e- replacement. Awaiting morning labs   GI: LBM: 7/8, denies n/v   : Voids spontaneously   Diet: Regular diet   Activity: UAL with cane   Lines: R DL PICC; Red line infusing chemo, purple HL  Skin Issues: no issues found       Shift Updates: Mostly asleep during shift. Pt went outside x 1 for a smoke. Care management/ social work consult scheduled for today. POC ongoing.    "

## 2025-07-10 NOTE — PLAN OF CARE
Goal Outcome Evaluation:      Plan of Care Reviewed With: patient    Overall Patient Progress: no changeOverall Patient Progress: no change    Outcome Evaluation: Admitted for Cycle 4 HiDAC. First dose given this afternoon - tolerated well. PICC in place. On 4L supplemental O2 with O2 sats at 92--93.    [id number]    A&Ox4, able to make needs known. Hypertensive, Bpmax 142/89. Denies SOB/chest pain. At bedtime, Alejandra's SpO2 was in the upper 80s-lower 90s. Placed on 4L supplemental O2 via nasal canula and now at 92-94. First dose of HiDAC given this afternoon - tolerated well. Regular diet - good appetite. LBM 7/8. Voiding w/o difficulty. Double lumen PICC placed today - Sl'd. Pt has an infrequent, productive cough. Pt c/o a headache this shift - requested for and given PRN Tylenol which provided relief. Pt is up ad vanda and went outside to smoke about 3 times this shift. In between cares, pt was sleeping, says she is tired from waking up early this morning. Continue to monitor and to follow POC.

## 2025-07-10 NOTE — PLAN OF CARE
Admitted from home for scheduled consolidation chemotherapy.  2 RN full   skin assessment completed by Giovanna Lin RN and Marysol JARA RN.   Skin assessment finding: skin intact, no problems . PICC on R arm.   Interventions/actions: None needed.    Will continue to monitor.

## 2025-07-10 NOTE — PROGRESS NOTES
[facility]    Progress Note  Hematology / Oncology     Date of Admission:  7/9/2025  Hospital Day #: 1   Date of Service (when I saw the patient): 07/10/2025    Assessment & Plan   Alejandra Villegas is a 59 year old female with a past medical history significant for COPD, migraines, rheumatoid arthritis, aortic stenosis, anxiety, and recent diagnosis of AML who is admitted for scheduled consolidation chemotherapy with HiDAC (C4D1=7/9/25).     TODAY:  - D2 HiDAC, tolerating well thus far.  - Oxygen supplementation overnight up to 4L, given COPD do not need O2 unless <88%.   - Continue best supportive cares.     HEME/ONC  # AML with NPM1 mutation  Had been seen by PCP Dec 2024 w/ progressive fatigue and nausea where she was found leukopenic WBC  2.4 and neutropenic w/ ANC 0.3. Hgb 12. Was referred to local hematology/oncology clinic for further evaluation and seen by Dr. Samina Harris. BMBx 2/10/25 done at OSH showed hypercellular marrow w/ trilineage hematopoiesis w/ dyspoiesis with mildly elevated blasts of 4% on morphology. NGS w/ NPM1, IDH2, and NRAS mutations. She was admitted to [facility] 2/26/25 for further workup and management. Slides were re-reviewed by [facility] Hematopathology, who noted hypercellular marrow with 8% blasts by morphology. Despite relatively low blast percentage, findings were felt most consistent with a diagnosis AML with NPM1 mutation by WHO 2022 (which does not require a specific blast threshold). Following interdepartmental discussions and review of the available literature, patient was started on intensive induction with 7+3 (Day 1=3/5/25). Midcycle BMBx 3/19/25 with no morphologic or immunophenotypic evidence of AML. D28 BMBx completed 3/31/25 with hypercellular marrow (60 to 70%) with no overt dysplasia or increase in blasts, noted flow with 4.3% blasts of unusual phenotype; MRD- by NGS. Due to overall disposition timeline as well as patient [place]  [place], preceded directly with consolidation chemotherapy with HiDAC (C1D1=4/3/25) which she tolerated well. Completed C2 HiDAC (C2D1=5/6/2025) without complications. Bone marrow biopsy completed s/p 2 cycles of HiDAC on 6/6/25 with no evidence of persistent AML; MRD- by NGS. Completed C3 HiDAC (C3D1=6/11/25) without complications. Now admitted for C4 HiDAC (C4D1=7/9/25).  - PICC placed upon admission, plan to remove prior to discharge  - Baseline cardiopulmonary studies:  - Echo w/ EF 60-65%, mild known aortic stenosis.  - EKG (2/27) with NSR, QTc 480  - CXR (4/27) Small area of new infiltrate versus atelectasis left lung base. No consolidation. R lung clear.  - Baseline viral serologies: CMV IgG+, EBV IgG+, HepB sAg-, HepB cAb-, HepB sAb-, HSV1+/2+, HIV-  - HLA Typing sent on 3/3/2025 and 4/25/2025. BMT Consult on 4/16, no immediate plans for bone marrow transplant. Patient endorses preference to avoid transplant.   - PICC placed on admission, anticipate removal upon discharge.      Treatment plan: HiDAC (C4D1=7/9/25)  - Cytarabine 3 g/m  IV q12h D1-3   - Neulasta 6 mg D5 - to be administered at [facility], scheduled 7/14  Supportive medications: Zofran 8 mg q12h, Dexamethasone 4 mg q12h D1-3, Prednisolone eye drops QID D1-5     # Anemia  # Risk for pancytopenia  Secondary to underlying hematologic malignancy and exacerbated by recent chemotherapy. Historical pancytopenia has since recovered.  - Follow daily CBC  - Transfuse to keep Hgb >7, plt >10K  - Okay to transfuse using blood consent on file from 6/27/25 (signed 6/25/25). Indication for transfusion remains the same.     ID   # ID prophylaxis  - Acyclovir 400 mg BID  - Cefpodoxime 200 mg BID when ANC <1.0; ANC at admission 3.3.   - Posaconazole 300 mg daily - continue given smoking history   - Note: Posaconazole copay $1.60. Trialed initially on vori, then rotated to posa x3/18 given visual hallucinations     # Recent sepsis with  encephalopathy without septic shock  She presented for a RBC infusion at [facility] [place] and on arrival she was febrile at 102.5 with altered mental status. Sepsis protocol initiated. Platelets at 49, hemoglobin at 6.0, WBC at 9.3. CT head negative. CXR with interstitial thickening, possible pneumonia. Treated with Vancomycin, Cefepime. Blood cultures remained NGTD. She remained afebrile and AMS resolved within ~2 hours. Received blood product transfusions and tolerated well. Admitted for ~24 hrs. Discharged on Levaquin 750 mg daily x 5 days.   - Upon admission, no recurrent fevers or altered mental status per patient.      GI  # GERD  # History of intermittent epigastric pain  CT C/A/P 4/14/2025 with evidence of esophagitis (thought due to reflux/recent chemotherapy) and small hiatal hernia. Symptoms have historically improved on maximal medical therapy as below.  - Continue PTA Protonix BID, Pepcid BID, Carafate QID, TUMS PRN     PULM  # COPD  Longstanding history of COPD. On admission patient reports symptoms are manageable with no recent exacerbations. Most recent PFTs (2018) were normal.  - Continue PTA Symbicort inhaler and PRN DuoNebs   - Encourage smoking cessation     CV  # Essential hypertension  Previously on lisinopril 10 mg daily, which was held due to well-managed blood pressures during prior admission. Resumed x6/12 with recent elevated readings.  - Continue PTA Lisinopril 10 mg daily   - Trend BPs     # Infrarenal abdominal aortic aneurysm  Noted incidentally on CT C/A/P (3/19/25), measuring 3.4 cm in diameter.  - Attention on follow-up imaging     RENAL/FEN  # Stage 2 CKD  Baseline Cr ~ 0.7. On admission Cr 0.75.  - Continue to trend on daily labs and encourage PO hydration     NEURO  # Chronic migraine w/o aura  # Chronic headaches  Present since childhood. Reportedly triggered by neck manipulation/movement. Reportedly well-managed with regimen below. Headaches occurring throughout admission with  daily APAP use, also chronic and managed with APAP PRN at home. Patient notes that her headaches throughout prior hospitalizations have been consistent with her typical daily headaches with no reported changes in character, quality, location, severity, or frequency. She denies new associated neurological changes. May consider LP to exclude CNS leukemia, but given this reassuring history and absence of other indications for LP (no hyperleukocytosis, no monocytic phenotype, no FLT3 mutation, etc), this was historically deferred.  - APAP PRN     MISC   # BROOKS  # MDD  # At risk for adjustment disorder  - Continue PTA paroxetine, Atarax PRN     # Tobacco use disorder  Has smoked since age 16, 1.5 ppd (roughly 65 pack years). Attempting to cut back as recently as 01/2025 to 0.5 ppd. We discussed the risks of smoking during induction chemotherapy including increased risk for infection in setting of severe neutropenia that could further complicate course, placing her at risk for severe complications that could be life-threatening or even fatal. Trialed nicotine patch, then self-discontinued after reporting that she felt the nicotine patch precipitated an anxiety attack/episode of chest pain on 3/9. Has been offered a lower dose versus alternative form of NRT, which patient declined.  - Encourage smoking cessation; patient continues to smoke at this time, despite understanding/acknowledgement of the risks. Reports trying to cut down.  - Continue posaconazole ppx, regardless of ANC, given high risk for fungal pulmonary infection in the setting of ongoing tobacco abuse.     # Degenerative disc disease  Appears likely multi-level; no MRI available for review, though note that patient has previously had epidural injections at L3-4 and L5-S1 (2016). Reported taking gabapentin 400 mg TID PRN prior to admission. Mild exacerbation noted 3/29; improved with resumption of gabapentin.  - Continue gabapentin 400 mg TID     #  "Deconditioning  Using cane regularly for ambulatory assistance.  - Consider PT consult while inpatient     Chronic Problems:  # Vitamin D deficiency - Continue PTA vit D supplement  # Seasonal allergies - Claritin 10 mg daily  # Rheumatoid arthritis - Patient reported trying treatment though was unable to tolerate well. Managed with Tylenol PRN. Most recent RF >650 (2/10/25). JI negative.    # Prediabetes - Last A1c 6.0 x12/9/24. Has been managing with lifestyle modifications.   # Mixed hyperlipidemia - Lipid panel has remained at goal, 1/14/25 panel w/ cholesterol 163, , LDL 92, HDL 45. - Held PTA rosuvastatin on admission  # H/o aortic stenosis - Patient noted on admission longstanding history of aortic stenosis. Pre-chemo echo w/ EF 60-65% and mild aortic stenosis and insufficiency. No previous echo to compare.    Clinically Significant Risk Factors        # Hypokalemia: Lowest K = 3.2 mmol/L in last 2 days, will replace as needed            # Hypertension: Noted on problem list            # Obesity: Estimated body mass index is 30.17 kg/m  as calculated from the following:    Height as of this encounter: 1.638 m (5' 4.5\").    Weight as of this encounter: 81 kg (178 lb 8 oz)., PRESENT ON ADMISSION     # Financial/Environmental Concerns:    # Asthma: noted on problem list      Fluids/Electrolytes/Nutrition:  - Regular diet as tolerated  - IVF per chemotherapy protocol   - Lyte replacement PRN per standing protocols    Prophylaxis/Miscellaneous:  Bowel: Senna/Miralax PRN   GI: PTA PPI, Pepcid   VTE: Lovenox ppx   Lines:     Code Status: FULL   Disposition: Admit to hospital for scheduled chemotherapy. Discharge pending regimen completion and clinical tolerance, anticipate ~4 day admission.   Follow up: Labs @ [facility] 7/14, 7/16, 7/18. [facility] follow up 7/18, virtual.    Medically Ready for Discharge: Anticipated in 2-4 Days       Staffed with Dr. Bella.    I spent >30 minutes in the care of this patient " today, which included time necessary for review of interval events, obtaining history and physical exam, ordering medication(s)/test(s) as medically indicated, discussion with interdisciplinary/consult team(s), care coordination, and documentation time.     Nery Crowell PA-C  Hematology/Oncology  Pager: #[phone]    Interval History   Chart reviewed. No acute events overnight. Given O2 supplementation however PMH of COPD, goal O2 >88%.     Alejandra continues to feel well today. Denies chest pain, shortness of breath, nausea/vomiting/diarrhea, peripheral edema, skin changes, visual disturbances, fevers, chills. Chronic cough stable, clear sputum. Last bowel movement today. Appetite intact. Sleeping well, slept a lot yesterday from early morning drive. Reviewed plan of care today and ongoing scheduling for outpatient follow up. All concerns were addressed and questions were answered.    A comprehensive review of symptoms was performed and was negative except as detailed in the interval history above.    Physical Exam   Vital Signs with Ranges  Temp:  [97.6  F (36.4  C)-98.8  F (37.1  C)] 98.3  F (36.8  C)  Pulse:  [76-78] 78  Resp:  [18-20] 20  BP: (116-154)/(66-90) 152/90  SpO2:  [88 %-96 %] 92 %    I/O last 3 completed shifts:  In: -   Out: 2250 [Urine:2250]    Vitals:    07/09/25 1011 07/10/25 0951   Weight: 80.9 kg (178 lb 5.6 oz) 81 kg (178 lb 8 oz)     Constitutional: Pleasant, conversational, and cooperative female. Awake and alert. Non- toxic appearing. No signs of acute distress.   HEENT: Normocephalic, atraumatic. Sclera clear, conjunctiva normal. Oropharynx with moist mucus membranes. Poor dentition.  Respiratory: Breathing comfortably on room air, speaking in full sentences, no evidence of respiratory distress. Lungs CTAB without stridor, wheeze, rhonchi, or rales.   Cardiovascular: Regular rate and rhythm. No peripheral edema.    GI: Abdomen with normoactive bowel sounds, soft, non-distended, and  non-tender throughout.   Skin: Skin is clean, dry, and intact. No jaundice, lesions, ecchymosis, or erythema on visible skin. Scattered tattoos and scars on upper extremities.  Neurologic: Alert and oriented. Grossly nonfocal.  Moves extremities spontaneously and equally.  Memory and thought process preserved. Steady gait seen ambulating halls.  Neuropsychiatric: Calm, affect congruent to situation. Good judgement and insight.     Medications   Current Facility-Administered Medications   Medication Dose Route Frequency Provider Last Rate Last Admin     Current Facility-Administered Medications   Medication Dose Route Frequency Provider Last Rate Last Admin    acyclovir (ZOVIRAX) tablet 400 mg  400 mg Oral BID Nery Crowell PA-C   400 mg at 07/10/25 0942    budesonide-formoterol (SYMBICORT/BREYNA) 80-4.5 MCG/ACT inhaler 2 puff  2 puff Inhalation BID Nery Crowell PA-C   2 puff at 07/10/25 0940    cyanocobalamin (VITAMIN B-12) tablet 500 mcg  500 mcg Oral Daily Nery Crowell PA-C   500 mcg at 07/10/25 0941    cytarabine (PF) (CYTOSAR) 5,730 mg in D5W 332.3 mL infusion  3 g/m2 (Treatment Plan Recorded) Intravenous Q12H DoConsuelo diaz APRN .8 mL/hr at 07/10/25 0526 5,730 mg at 07/10/25 0526    dexAMETHasone (DECADRON) tablet 4 mg  4 mg Oral Q12H DoConsuelo diaz APRN CNP   4 mg at 07/10/25 0447    enoxaparin ANTICOAGULANT (LOVENOX) injection 40 mg  40 mg Subcutaneous Q24H Nery Crowell PA-C   40 mg at 07/09/25 1934    famotidine (PEPCID) tablet 20 mg  20 mg Oral BID Nery Crowell PA-C   20 mg at 07/10/25 0942    [START ON 7/13/2025] filgrastim-aafi (NIVESTYM) injection 480 mcg  480 mcg Subcutaneous Daily at 8 pm Consuelo Huff APRN CNP        gabapentin (NEURONTIN) capsule 400 mg  400 mg Oral TID Nery Crowell PA-C   400 mg at 07/10/25 0940    heparin lock flush 10 unit/mL injection 5-15 mL  5-15 mL Intracatheter Q24H Nery Crowell PA-C         lisinopril (ZESTRIL) tablet 10 mg  10 mg Oral Daily MervatAnkitara L, PA-C   10 mg at 07/10/25 0941    loratadine (CLARITIN) tablet 10 mg  10 mg Oral Daily Mervat, Nery L, PA-C   10 mg at 07/10/25 0941    ondansetron (ZOFRAN) tablet 8 mg  8 mg Oral Q12H Dove, Consuelo, APRN CNP   8 mg at 07/10/25 0447    pantoprazole (PROTONIX) EC tablet 40 mg  40 mg Oral BID AC MervatNery, PA-C   40 mg at 07/10/25 0941    PARoxetine (PAXIL) tablet 20 mg  20 mg Oral QAM Nery Crowell, PA-C   20 mg at 07/10/25 0942    posaconazole (NOXAFIL) DR tablet TBEC 300 mg  300 mg Oral QAM Nery Crowell PA-C   300 mg at 07/10/25 0939    potassium chloride sebastian ER (KLOR-CON M10) CR tablet 20 mEq  20 mEq Oral Daily Nery Crowell, PA-C   20 mEq at 07/10/25 0941    prednisoLONE acetate (PRED FORTE) 1 % ophthalmic susp 2 drop  2 drop Both Eyes 4x Daily Dove, Consuelo, APRN CNP   2 drop at 07/10/25 0940    sodium chloride (PF) 0.9% PF flush 10-40 mL  10-40 mL Intracatheter Q8H Nery Crowell PA-C   10 mL at 07/09/25 1606    sucralfate (CARAFATE) suspension 1 g  1 g Oral 4x Daily AC & HS Nery Crowell PA-C   1 g at 07/10/25 0939    Vitamin D3 (CHOLECALCIFEROL) tablet 25 mcg  25 mcg Oral Daily Nery Crowell PA-C   25 mcg at 07/10/25 0941     Antiinfectives  Anti-infectives (From now, onward)      Start     Dose/Rate Route Frequency Ordered Stop    07/10/25 0800  posaconazole (NOXAFIL) DR tablet TBEC 300 mg        Note to Pharmacy: PTA Sig:Take 3 tablets (300 mg) by mouth every morning.      300 mg Oral EVERY MORNING 07/09/25 1008      07/09/25 2000  acyclovir (ZOVIRAX) tablet 400 mg        Note to Pharmacy: PTA Sig:Take 1 tablet (400 mg) by mouth 2 times daily.      400 mg Oral 2 TIMES DAILY 07/09/25 1008            Data   CBC   Recent Labs   Lab 07/10/25  0457 07/09/25  1059 07/09/25  0849 07/07/25  0806   WBC 3.9* 6.8 7.0 6.1   RBC 2.94* 2.89* 2.79* 2.77*   HGB 9.2* 9.2* 8.9*  8.8*   HCT 29.7* 28.8* 27.4* 27.1*   * 100 98 98   MCH 31.3 31.8 31.9 31.8   MCHC 31.0* 31.9 32.5 32.5   RDW 21.2* 22.1* 21.8* 21.2*    260 233 216     CMP   Recent Labs   Lab 07/10/25  0457 07/09/25  1059 07/09/25  0849 07/07/25  0806    140 141 139   POTASSIUM 4.5 3.6 3.2* 3.6   CHLORIDE 106 102 103 102   CO2 25 25 23 25   ANIONGAP 11 13 15 12   * 116* 170* 114*   BUN 14.9 7.9 7.3 6.8   CR 0.58 0.75 0.70 0.62   GFRESTIMATED >90 >90 >90 >90   TOBIN 9.6 9.6 9.4 9.1   MAG 2.3 2.2  --   --    PHOS 3.8 4.5  --   --    PROTTOTAL 6.9 7.2 7.0 6.9   ALBUMIN 4.0 4.0 4.0 3.8   BILITOTAL 0.4 0.4 0.4 0.4   ALKPHOS 101 111 114 116   AST 19 22 20 20   ALT 11 11 10 12     LFTs   Recent Labs   Lab 07/10/25  0457   PROTTOTAL 6.9   ALBUMIN 4.0   BILITOTAL 0.4   ALKPHOS 101   AST 19   ALT 11     Coagulation Studies No lab results found in last 7 days.

## 2025-07-10 NOTE — PLAN OF CARE
Goal Outcome Evaluation:      Plan of Care Reviewed With: patient    Overall Patient Progress: no changeOverall Patient Progress: no change    Outcome Evaluation: C4D2 Hi-DAC - tolerating well. No acute changes this shift.    [id number]    AOx4, able to make needs known. VSS. On RA. Denies SOB/nausea. C/o a headache this afternoon - requested for and received PRN Tylenol which provided relief. Regular diet - good appetite. Voiding w/o difficulty. LBM 7/10. Double lumen PICC heparin locked. Received chemotherapy this afternoon - tolerated well. Pt ambulates halls frequently and goes outside to smoke. Continue with POC.

## 2025-07-10 NOTE — PLAN OF CARE
Goal Outcome Evaluation:      Plan of Care Reviewed With: patient    Overall Patient Progress: no changeOverall Patient Progress: no change    Outcome Evaluation: discharge done once medically ready        Tamra Kwon, RN, [place]  Float Nurse Care Coordinator  [facility] Acute Care Management   Searchable on Vocera: 5A Onc [phone] thru [phone] RNCC, 5C OFF SERVICE [id number] thru [phone] RNCC  Covering 07/10/25 only

## 2025-07-10 NOTE — PROGRESS NOTES
Nursing Focus: Chemotherapy  D: Positive blood return via purple lumen of PICC. Insertion site is clean/dry/intact, dressing intact with no complaints of pain.  Pre infusion assessment documented in Flowsheet Cerebellar assessment done and passed prior to starting infusion..    I: Premedications given per order (see electronic medical administration record). Dose #3 of cytarabine started to infuse over 3 hours. Reviewed pt teaching on chemotherapy side effects.  Pt denies need for further teaching. Chemotherapy double checked per protocol by two chemotherapy competent RN's.   A: Tolerating infusion well. Denies nausea and or pain.   P: Continue to monitor urine output and symptoms of nausea. Screen for symptoms of toxicity.

## 2025-07-10 NOTE — PROGRESS NOTES
Nursing Focus: Chemotherapy    D: Positive blood return via RL PICC. Insertion site is clean/dry/intact, dressing intact with no complaints of pain.  Urine output is recorded in intake in Doc Flowsheet.  Cerebellar assessment completed and intact.     I: Premedications given per order (see electronic medical administration record). Dose #1 of HiDAC started to infuse over 3 hours. Reviewed pt teaching on chemotherapy side effects.  Pt denies need for further teaching. Chemotherapy double checked per protocol by two chemotherapy competent RN's.     A: Tolerating infusion well. Denies nausea and or pain.     P: Continue to monitor urine output and symptoms of nausea. Screen for symptoms of toxicity.

## 2025-07-10 NOTE — CONSULTS
Care Management Initial Consult    General Information  Assessment completed with: -chart review, Patient,    Type of CM/SW Visit: Initial Assessment  Primary Care Provider verified and updated as needed: Yes   Readmission within the last 30 days: planned readmission   Reason for Consult: discharge planning  Advance Care Planning: Advance Care Planning Reviewed: present on chart        Communication Assessment  Patient's communication style: spoken language (English or Bilingual)    Hearing Difficulty or Deaf: no   Wear Glasses or Blind: yes    Cognitive  Cognitive/Neuro/Behavioral: WDL                      Living Environment:   People in home: child(lana), adult (son)     Current living Arrangements: mobile home (now has wood stove, running water)      Able to return to prior arrangements: yes     Family/Social Support:  Care provided by: self (sister)  Provides care for: no one  Marital Status:   Support system: Children, Sibling(s)          Description of Support System: Supportive, Involved    Support Assessment: Adequate family and caregiver support, Adequate social supports    Current Resources:   Patient receiving home care services: No  Community Resources: None  Equipment currently used at home: cane, straight  Supplies currently used at home: None    Employment/Financial:  Employment Status: disabled     Financial Concerns: none   [place] the patient's insurance plan have a 3 day qualifying hospital stay waiver?  No    Functional Status:  Prior to admission patient needed assistance:   Dependent ADLs:: Independent  Dependent IADLs:: Independent  Values/Beliefs:  Spiritual, Cultural Beliefs, Anabaptist Practices, Values that affect care: no       Discussed  Partnership in Safe Discharge Planning  document with patient/family: No    Additional Information:  Per H/P: Acute myeloid leukemia in relapse (H) [C92.02]  Acute myeloid leukemia (H) [C92.00]  Acute myeloid leukemia not having achieved remission  (H) [C92.00]    Initial assessment completed due to high risk readmission score. See details above.     D-C Ride: sisterBart will  patient Sat Morning after chemo is completed.     No further care management intervention anticipated at this time.  Please re-consult if further needs arise.  Care management signing off.        Tamra Kwon, RN, [place]  Float Nurse Care Coordinator  [facility] Acute Care Management   Searchable on Vocera: 5A Onc [phone] thru [phone] RNCC, 5C OFF SERVICE [phone] thru [phone] RNCC  Covering 07/10/25 only

## 2025-07-11 LAB
ALBUMIN SERPL BCG-MCNC: 3.8 G/DL (ref 3.5–5.2)
ALP SERPL-CCNC: 91 U/L (ref 40–150)
ALT SERPL W P-5'-P-CCNC: 11 U/L (ref 0–50)
ANION GAP SERPL CALCULATED.3IONS-SCNC: 11 MMOL/L (ref 7–15)
AST SERPL W P-5'-P-CCNC: 19 U/L (ref 0–45)
BASOPHILS # BLD AUTO: 0 10E3/UL (ref 0–0.2)
BASOPHILS NFR BLD AUTO: 0 %
BILIRUB SERPL-MCNC: 0.3 MG/DL
BUN SERPL-MCNC: 19 MG/DL (ref 6–20)
CALCIUM SERPL-MCNC: 9.6 MG/DL (ref 8.8–10.4)
CHLORIDE SERPL-SCNC: 106 MMOL/L (ref 98–107)
CREAT SERPL-MCNC: 0.58 MG/DL (ref 0.51–0.95)
EGFRCR SERPLBLD CKD-EPI 2021: >90 ML/MIN/1.73M2
EOSINOPHIL # BLD AUTO: 0 10E3/UL (ref 0–0.7)
EOSINOPHIL NFR BLD AUTO: 0 %
ERYTHROCYTE [DISTWIDTH] IN BLOOD BY AUTOMATED COUNT: 21.2 % (ref 10–15)
GLUCOSE SERPL-MCNC: 149 MG/DL (ref 70–99)
HCO3 SERPL-SCNC: 25 MMOL/L (ref 22–29)
HCT VFR BLD AUTO: 26.8 % (ref 35–47)
HGB BLD-MCNC: 8.2 G/DL (ref 11.7–15.7)
IMM GRANULOCYTES # BLD: 0.1 10E3/UL
IMM GRANULOCYTES NFR BLD: 1 %
LDH SERPL L TO P-CCNC: 318 U/L (ref 0–250)
LYMPHOCYTES # BLD AUTO: 0.2 10E3/UL (ref 0.8–5.3)
LYMPHOCYTES NFR BLD AUTO: 2 %
MAGNESIUM SERPL-MCNC: 2.4 MG/DL (ref 1.7–2.3)
MCH RBC QN AUTO: 31.3 PG (ref 26.5–33)
MCHC RBC AUTO-ENTMCNC: 30.6 G/DL (ref 31.5–36.5)
MCV RBC AUTO: 102 FL (ref 78–100)
MONOCYTES # BLD AUTO: 0.6 10E3/UL (ref 0–1.3)
MONOCYTES NFR BLD AUTO: 8 %
NEUTROPHILS # BLD AUTO: 6.4 10E3/UL (ref 1.6–8.3)
NEUTROPHILS NFR BLD AUTO: 89 %
NRBC # BLD AUTO: 0 10E3/UL
NRBC BLD AUTO-RTO: 0 /100
PHOSPHATE SERPL-MCNC: 4.3 MG/DL (ref 2.5–4.5)
PLATELET # BLD AUTO: 234 10E3/UL (ref 150–450)
POTASSIUM SERPL-SCNC: 4.9 MMOL/L (ref 3.4–5.3)
PROT SERPL-MCNC: 6.7 G/DL (ref 6.4–8.3)
RBC # BLD AUTO: 2.62 10E6/UL (ref 3.8–5.2)
SODIUM SERPL-SCNC: 142 MMOL/L (ref 135–145)
URATE SERPL-MCNC: 5.6 MG/DL (ref 2.4–5.7)
WBC # BLD AUTO: 7.2 10E3/UL (ref 4–11)

## 2025-07-11 PROCEDURE — 83735 ASSAY OF MAGNESIUM: CPT

## 2025-07-11 PROCEDURE — 84100 ASSAY OF PHOSPHORUS: CPT

## 2025-07-11 PROCEDURE — 83615 LACTATE (LD) (LDH) ENZYME: CPT

## 2025-07-11 PROCEDURE — 250N000009 HC RX 250

## 2025-07-11 PROCEDURE — 85025 COMPLETE CBC W/AUTO DIFF WBC: CPT

## 2025-07-11 PROCEDURE — 250N000011 HC RX IP 250 OP 636

## 2025-07-11 PROCEDURE — 258N000003 HC RX IP 258 OP 636

## 2025-07-11 PROCEDURE — 120N000002 HC R&B MED SURG/OB UMMC

## 2025-07-11 PROCEDURE — 99233 SBSQ HOSP IP/OBS HIGH 50: CPT | Mod: FS

## 2025-07-11 PROCEDURE — 84155 ASSAY OF PROTEIN SERUM: CPT

## 2025-07-11 PROCEDURE — 250N000012 HC RX MED GY IP 250 OP 636 PS 637

## 2025-07-11 PROCEDURE — 84550 ASSAY OF BLOOD/URIC ACID: CPT

## 2025-07-11 PROCEDURE — 250N000013 HC RX MED GY IP 250 OP 250 PS 637

## 2025-07-11 RX ORDER — PREDNISOLONE ACETATE 10 MG/ML
2 SUSPENSION/ DROPS OPHTHALMIC 4 TIMES DAILY
Qty: 5 ML | Refills: 0 | Status: SHIPPED | OUTPATIENT
Start: 2025-07-11

## 2025-07-11 RX ADMIN — PREDNISOLONE ACETATE 2 DROP: 10 SUSPENSION/ DROPS OPHTHALMIC at 16:20

## 2025-07-11 RX ADMIN — PANTOPRAZOLE SODIUM 40 MG: 40 TABLET, DELAYED RELEASE ORAL at 16:20

## 2025-07-11 RX ADMIN — CYTARABINE 5730 MG: 100 INJECTION, SOLUTION INTRATHECAL; INTRAVENOUS; SUBCUTANEOUS at 05:26

## 2025-07-11 RX ADMIN — SUCRALFATE 1 G: 1 SUSPENSION ORAL at 10:59

## 2025-07-11 RX ADMIN — LORATADINE 10 MG: 10 TABLET ORAL at 09:16

## 2025-07-11 RX ADMIN — POTASSIUM CHLORIDE 20 MEQ: 750 TABLET, EXTENDED RELEASE ORAL at 09:16

## 2025-07-11 RX ADMIN — FAMOTIDINE 20 MG: 20 TABLET, FILM COATED ORAL at 09:16

## 2025-07-11 RX ADMIN — GABAPENTIN 400 MG: 300 CAPSULE ORAL at 09:15

## 2025-07-11 RX ADMIN — POSACONAZOLE 300 MG: 100 TABLET, DELAYED RELEASE ORAL at 09:16

## 2025-07-11 RX ADMIN — BUDESONIDE AND FORMOTEROL FUMARATE DIHYDRATE 2 PUFF: 80; 4.5 AEROSOL RESPIRATORY (INHALATION) at 19:42

## 2025-07-11 RX ADMIN — ENOXAPARIN SODIUM 40 MG: 40 INJECTION SUBCUTANEOUS at 19:42

## 2025-07-11 RX ADMIN — GABAPENTIN 400 MG: 300 CAPSULE ORAL at 16:19

## 2025-07-11 RX ADMIN — PREDNISOLONE ACETATE 2 DROP: 10 SUSPENSION/ DROPS OPHTHALMIC at 11:01

## 2025-07-11 RX ADMIN — GABAPENTIN 400 MG: 300 CAPSULE ORAL at 19:42

## 2025-07-11 RX ADMIN — LISINOPRIL 10 MG: 10 TABLET ORAL at 09:16

## 2025-07-11 RX ADMIN — SUCRALFATE 1 G: 1 SUSPENSION ORAL at 16:21

## 2025-07-11 RX ADMIN — ACYCLOVIR 400 MG: 400 TABLET ORAL at 19:42

## 2025-07-11 RX ADMIN — DEXAMETHASONE 4 MG: 4 TABLET ORAL at 04:47

## 2025-07-11 RX ADMIN — CYTARABINE 5730 MG: 100 INJECTION, SOLUTION INTRATHECAL; INTRAVENOUS; SUBCUTANEOUS at 17:39

## 2025-07-11 RX ADMIN — ONDANSETRON HYDROCHLORIDE 8 MG: 8 TABLET, FILM COATED ORAL at 04:47

## 2025-07-11 RX ADMIN — SUCRALFATE 1 G: 1 SUSPENSION ORAL at 09:15

## 2025-07-11 RX ADMIN — Medication 25 MCG: at 09:15

## 2025-07-11 RX ADMIN — DEXAMETHASONE 4 MG: 4 TABLET ORAL at 17:06

## 2025-07-11 RX ADMIN — PREDNISOLONE ACETATE 2 DROP: 10 SUSPENSION/ DROPS OPHTHALMIC at 19:42

## 2025-07-11 RX ADMIN — PAROXETINE HYDROCHLORIDE 20 MG: 20 TABLET, FILM COATED ORAL at 09:16

## 2025-07-11 RX ADMIN — CYANOCOBALAMIN TAB 500 MCG 500 MCG: 500 TAB at 09:16

## 2025-07-11 RX ADMIN — ONDANSETRON HYDROCHLORIDE 8 MG: 8 TABLET, FILM COATED ORAL at 17:06

## 2025-07-11 RX ADMIN — Medication 5 ML: at 21:35

## 2025-07-11 RX ADMIN — ACYCLOVIR 400 MG: 400 TABLET ORAL at 09:16

## 2025-07-11 RX ADMIN — SUCRALFATE 1 G: 1 SUSPENSION ORAL at 21:35

## 2025-07-11 RX ADMIN — FAMOTIDINE 20 MG: 20 TABLET, FILM COATED ORAL at 19:42

## 2025-07-11 RX ADMIN — BUDESONIDE AND FORMOTEROL FUMARATE DIHYDRATE 2 PUFF: 80; 4.5 AEROSOL RESPIRATORY (INHALATION) at 09:16

## 2025-07-11 RX ADMIN — PANTOPRAZOLE SODIUM 40 MG: 40 TABLET, DELAYED RELEASE ORAL at 09:15

## 2025-07-11 ASSESSMENT — ACTIVITIES OF DAILY LIVING (ADL)
ADLS_ACUITY_SCORE: 40

## 2025-07-11 NOTE — PLAN OF CARE
Goal Outcome Evaluation:      Plan of Care Reviewed With: patient    Overall Patient Progress: no changeOverall Patient Progress: no change    Outcome Evaluation: [id number]    Summary Type: 5A Report   Pain:  Denies  Neuro:WDL  CV:WDL  Resp:.WDL except, cough, effort/expansion, rhythm/pattern  GI:WDL  :WDL  Skin: .WDL except, integrity  Activity Assistance: independent  Safety/Falls Risk: Level of Risk: High Risk    No signs of cytarabine toxicity noted. No acute events this shift, continue with care as planned.

## 2025-07-11 NOTE — PLAN OF CARE
"Goal Outcome Evaluation:    Time    BP (!) 147/93 (BP Location: Left arm)   Pulse 72   Temp 97.7  F (36.5  C) (Oral)   Resp 20   Ht 1.638 m (5' 4.5\")   Wt 81 kg (178 lb 8 oz)   LMP 01/01/2007 (Approximate)   SpO2 93%   BMI 30.17 kg/m      Reason for admission: HIDAC  Activity: UAL  Pain: denies  Neuro: alert and oriented. Cerebellar assessment intact  Cardiac: wnl, denies chest pain  Respiratory: denies SOB, no distress observed, on RA.   GI/: LBM 7/10, voids spontaneously  Diet: regular  Lines: DL PICC  Wounds:   Labs/imaging: Please review lab in the chart      New changes this shift:     Plan:       Continue to monitor and follow POC    "

## 2025-07-11 NOTE — PROGRESS NOTES
Nursing Focus: Chemotherapy  D: Positive blood return via PICC . Insertion site is clean/dry/intact, dressing intact with no complaints of pain.  Pre infusion assessment documented in Flowsheet (if applicable).    I: Premedications given per order (see electronic medical administration record). Dose #4 of CYTARABINE started to infuse over 3 hours. Reviewed pt teaching on chemotherapy side effects.  Pt denies need for further teaching. Chemotherapy double checked per protocol by two chemotherapy competent RN's.   A: Tolerating infusion well. Denies nausea and or pain.   P: Continue to monitor urine output and symptoms of nausea. Screen for symptoms of toxicity.

## 2025-07-11 NOTE — PROGRESS NOTES
[facility]    Progress Note  Hematology / Oncology     Date of Admission:  7/9/2025  Hospital Day #: 2   Date of Service (when I saw the patient): 07/11/2025    Assessment & Plan   Alejandra Villegas is a 59 year old female with a past medical history significant for COPD, migraines, rheumatoid arthritis, aortic stenosis, anxiety, and recent diagnosis of AML who is admitted for scheduled consolidation chemotherapy with HiDAC (C4D1=7/9/25).     TODAY:  - D3 HiDAC, tolerating well thus far.  - Continue best supportive cares. Anticipate discharge 7/12 AM following last cytarabine infusion.    HEME/ONC  # AML with NPM1 mutation  Had been seen by PCP Dec 2024 w/ progressive fatigue and nausea where she was found leukopenic WBC  2.4 and neutropenic w/ ANC 0.3. Hgb 12. Was referred to local hematology/oncology clinic for further evaluation and seen by Dr. Samina Harris. BMBx 2/10/25 done at OSH showed hypercellular marrow w/ trilineage hematopoiesis w/ dyspoiesis with mildly elevated blasts of 4% on morphology. NGS w/ NPM1, IDH2, and NRAS mutations. She was admitted to [facility] 2/26/25 for further workup and management. Slides were re-reviewed by [facility] Hematopathology, who noted hypercellular marrow with 8% blasts by morphology. Despite relatively low blast percentage, findings were felt most consistent with a diagnosis AML with NPM1 mutation by WHO 2022 (which does not require a specific blast threshold). Following interdepartmental discussions and review of the available literature, patient was started on intensive induction with 7+3 (Day 1=3/5/25). Midcycle BMBx 3/19/25 with no morphologic or immunophenotypic evidence of AML. D28 BMBx completed 3/31/25 with hypercellular marrow (60 to 70%) with no overt dysplasia or increase in blasts, noted flow with 4.3% blasts of unusual phenotype; MRD- by NGS. Due to overall disposition timeline as well as patient living in [facility],  preceded directly with consolidation chemotherapy with HiDAC (C1D1=4/3/25) which she tolerated well. Completed C2 HiDAC (C2D1=5/6/2025) without complications. Bone marrow biopsy completed s/p 2 cycles of HiDAC on 6/6/25 with no evidence of persistent AML; MRD- by NGS. Completed C3 HiDAC (C3D1=6/11/25) without complications. Now admitted for C4 HiDAC (C4D1=7/9/25).  - PICC placed upon admission, plan to remove prior to discharge  - Baseline cardiopulmonary studies:  - Echo w/ EF 60-65%, mild known aortic stenosis.  - EKG (2/27) with NSR, QTc 480  - CXR (4/27) Small area of new infiltrate versus atelectasis left lung base. No consolidation. R lung clear.  - Baseline viral serologies: CMV IgG+, EBV IgG+, HepB sAg-, HepB cAb-, HepB sAb-, HSV1+/2+, HIV-  - HLA Typing sent on 3/3/2025 and 4/25/2025. BMT Consult on 4/16, no immediate plans for bone marrow transplant. Patient endorses preference to avoid transplant.   - PICC placed on admission, anticipate removal upon discharge.      Treatment plan: HiDAC (C4D1=7/9/25)  - Cytarabine 3 g/m  IV q12h D1-3   - Neulasta 6 mg D5 - to be administered at [facility], scheduled 7/14  Supportive medications: Zofran 8 mg q12h, Dexamethasone 4 mg q12h D1-3, Prednisolone eye drops QID D1-5     # Anemia  # Risk for pancytopenia  Secondary to underlying hematologic malignancy and exacerbated by recent chemotherapy. Historical pancytopenia has since recovered.  - Follow daily CBC  - Transfuse to keep Hgb >7, plt >10K  - Okay to transfuse using blood consent on file from 6/27/25 (signed 6/25/25). Indication for transfusion remains the same.     ID   # ID prophylaxis  - Acyclovir 400 mg BID  - Cefpodoxime 200 mg BID when ANC <1.0; ANC at admission 3.3.   - Posaconazole 300 mg daily - continue given smoking history   - Note: Posaconazole copay $1.60. Trialed initially on vori, then rotated to posa x3/18 given visual hallucinations     # Recent sepsis with encephalopathy without  septic shock  She presented for a RBC infusion at [facility] and on arrival she was febrile at 102.5 with altered mental status. Sepsis protocol initiated. Platelets at 49, hemoglobin at 6.0, WBC at 9.3. CT head negative. CXR with interstitial thickening, possible pneumonia. Treated with Vancomycin, Cefepime. Blood cultures remained NGTD. She remained afebrile and AMS resolved within ~2 hours. Received blood product transfusions and tolerated well. Admitted for ~24 hrs. Discharged on Levaquin 750 mg daily x 5 days.   - Upon admission, no recurrent fevers or altered mental status per patient.      GI  # GERD  # History of intermittent epigastric pain  CT C/A/P 4/14/2025 with evidence of esophagitis (thought due to reflux/recent chemotherapy) and small hiatal hernia. Symptoms have historically improved on maximal medical therapy as below.  - Continue PTA Protonix BID, Pepcid BID, Carafate QID, TUMS PRN     PULM  # COPD  Longstanding history of COPD. On admission patient reports symptoms are manageable with no recent exacerbations. Most recent PFTs (2018) were normal.  - Continue PTA Symbicort inhaler and PRN DuoNebs   - Encourage smoking cessation     CV  # Essential hypertension  Previously on lisinopril 10 mg daily, which was held due to well-managed blood pressures during prior admission. Resumed x6/12 with recent elevated readings.  - Continue PTA Lisinopril 10 mg daily   - Trend BPs     # Infrarenal abdominal aortic aneurysm  Noted incidentally on CT C/A/P (3/19/25), measuring 3.4 cm in diameter.  - Attention on follow-up imaging     RENAL/FEN  # Stage 2 CKD  Baseline Cr ~ 0.7. On admission Cr 0.75.  - Continue to trend on daily labs and encourage PO hydration     NEURO  # Chronic migraine w/o aura  # Chronic headaches  Present since childhood. Reportedly triggered by neck manipulation/movement. Reportedly well-managed with regimen below. Headaches occurring throughout admission with daily APAP use, also  chronic and managed with APAP PRN at home. Patient notes that her headaches throughout prior hospitalizations have been consistent with her typical daily headaches with no reported changes in character, quality, location, severity, or frequency. She denies new associated neurological changes. May consider LP to exclude CNS leukemia, but given this reassuring history and absence of other indications for LP (no hyperleukocytosis, no monocytic phenotype, no FLT3 mutation, etc), this was historically deferred.  - APAP PRN     MISC   # BROOKS  # MDD  # At risk for adjustment disorder  - Continue PTA paroxetine, Atarax PRN     # Tobacco use disorder  Has smoked since age 16, 1.5 ppd (roughly 65 pack years). Attempting to cut back as recently as 01/2025 to 0.5 ppd. We discussed the risks of smoking during induction chemotherapy including increased risk for infection in setting of severe neutropenia that could further complicate course, placing her at risk for severe complications that could be life-threatening or even fatal. Trialed nicotine patch, then self-discontinued after reporting that she felt the nicotine patch precipitated an anxiety attack/episode of chest pain on 3/9. Has been offered a lower dose versus alternative form of NRT, which patient declined.  - Encourage smoking cessation; patient continues to smoke at this time, despite understanding/acknowledgement of the risks. Reports trying to cut down.  - Continue posaconazole ppx, regardless of ANC, given high risk for fungal pulmonary infection in the setting of ongoing tobacco abuse.     # Degenerative disc disease  Appears likely multi-level; no MRI available for review, though note that patient has previously had epidural injections at L3-4 and L5-S1 (2016). Reported taking gabapentin 400 mg TID PRN prior to admission. Mild exacerbation noted 3/29; improved with resumption of gabapentin.  - Continue gabapentin 400 mg TID     # Deconditioning  Using cane  "regularly for ambulatory assistance.  - Consider PT consult while inpatient     Chronic Problems:  # Vitamin D deficiency - Continue PTA vit D supplement  # Seasonal allergies - Claritin 10 mg daily  # Rheumatoid arthritis - Patient reported trying treatment though was unable to tolerate well. Managed with Tylenol PRN. Most recent RF >650 (2/10/25). JI negative.    # Prediabetes - Last A1c 6.0 x12/9/24. Has been managing with lifestyle modifications.   # Mixed hyperlipidemia - Lipid panel has remained at goal, 1/14/25 panel w/ cholesterol 163, , LDL 92, HDL 45. - Held PTA rosuvastatin on admission  # H/o aortic stenosis - Patient noted on admission longstanding history of aortic stenosis. Pre-chemo echo w/ EF 60-65% and mild aortic stenosis and insufficiency. No previous echo to compare.    Clinically Significant Risk Factors        # Hypokalemia: Lowest K = 3.2 mmol/L in last 2 days, will replace as needed            # Hypertension: Noted on problem list            # Obesity: Estimated body mass index is 30.17 kg/m  as calculated from the following:    Height as of this encounter: 1.638 m (5' 4.5\").    Weight as of this encounter: 81 kg (178 lb 8 oz)., PRESENT ON ADMISSION       # Financial/Environmental Concerns: none  # Asthma: noted on problem list      Fluids/Electrolytes/Nutrition:  - Regular diet as tolerated  - IVF per chemotherapy protocol   - Lyte replacement PRN per standing protocols    Prophylaxis/Miscellaneous:  Bowel: Senna/Miralax PRN   GI: PTA PPI, Pepcid   VTE: Lovenox ppx   Lines:     Code Status: FULL   Disposition: Admit to hospital for scheduled chemotherapy. Discharge pending regimen completion and clinical tolerance, anticipate ~4 day admission.   Follow up: Labs @ [facility] 7/14, 7/16, 7/18. [facility] follow up 7/18, virtual.    Medically Ready for Discharge: Anticipated Tomorrow       Staffed with Dr. Bella.    I spent >30 minutes in the care of this patient today, which included time " necessary for review of interval events, obtaining history and physical exam, ordering medication(s)/test(s) as medically indicated, discussion with interdisciplinary/consult team(s), care coordination, and documentation time.     Nery Crowell PA-C  Hematology/Oncology  Pager: #[phone]    Interval History   Chart reviewed. No acute events overnight. Afebrile and vitally stable.    Alejandra continues to do well today. Feels like treatment is going well and looks forward to outpatient discussions about surveillance. Denies chest pain, shortness of breath, headache, nausea/vomiting/diarrhea, skin changes, peripheral edema, visual disturbances. Appetite intact. Chronic cough at baseline, clear sputum producible. Discussed discharge plan including follow up scheduled for next week. She states understanding and agrees to plan. Sister to come pick her up tomorrow. All concerns were addressed and questions were answered.    A comprehensive review of symptoms was performed and was negative except as detailed in the interval history above.    Physical Exam   Vital Signs with Ranges  Temp:  [97.6  F (36.4  C)-98.7  F (37.1  C)] 97.8  F (36.6  C)  Pulse:  [72-86] 77  Resp:  [16-20] 18  BP: (122-147)/(73-93) 141/73  SpO2:  [93 %-97 %] 95 %    I/O last 3 completed shifts:  In: 600 [P.O.:600]  Out: 2000 [Urine:2000]    Vitals:    07/09/25 1011 07/10/25 0951   Weight: 80.9 kg (178 lb 5.6 oz) 81 kg (178 lb 8 oz)     Constitutional: Pleasant, conversational, and cooperative female. Awake and alert. Non- toxic appearing. No signs of acute distress.   HEENT: Normocephalic, atraumatic. Sclera clear, conjunctiva normal. Oropharynx with moist mucus membranes. Poor dentition.  Respiratory: Breathing comfortably on room air, speaking in full sentences, no evidence of respiratory distress. Lungs CTAB without stridor, wheeze, rhonchi, or rales.   Cardiovascular: Regular rate and rhythm. No peripheral edema.    GI: Abdomen with  normoactive bowel sounds, soft, non-distended, and non-tender throughout.   Skin: Skin is clean, dry, and intact. No jaundice, lesions, ecchymosis, or erythema on visible skin. Scattered tattoos and scars on upper extremities.  Neurologic: Alert and oriented. Grossly nonfocal.  Moves extremities spontaneously and equally.  Memory and thought process preserved. Steady gait seen ambulating halls.  Neuropsychiatric: Calm, affect congruent to situation. Good judgement and insight.     Medications   Current Facility-Administered Medications   Medication Dose Route Frequency Provider Last Rate Last Admin     Current Facility-Administered Medications   Medication Dose Route Frequency Provider Last Rate Last Admin    acyclovir (ZOVIRAX) tablet 400 mg  400 mg Oral BID Nery Crowell PA-C   400 mg at 07/10/25 2117    budesonide-formoterol (SYMBICORT/BREYNA) 80-4.5 MCG/ACT inhaler 2 puff  2 puff Inhalation BID Nery Crowell PA-C   2 puff at 07/10/25 2118    cyanocobalamin (VITAMIN B-12) tablet 500 mcg  500 mcg Oral Daily Nery Crowell PA-C   500 mcg at 07/10/25 0941    cytarabine (PF) (CYTOSAR) 5,730 mg in D5W 332.3 mL infusion  3 g/m2 (Treatment Plan Recorded) Intravenous Q12H DoConsuelo diaz APRN .8 mL/hr at 07/11/25 0526 5,730 mg at 07/11/25 0526    dexAMETHasone (DECADRON) tablet 4 mg  4 mg Oral Q12H Consuelo Huff APRN CNP   4 mg at 07/11/25 0447    enoxaparin ANTICOAGULANT (LOVENOX) injection 40 mg  40 mg Subcutaneous Q24H Nery Crowell PA-C   40 mg at 07/10/25 2117    famotidine (PEPCID) tablet 20 mg  20 mg Oral BID Nery Crowell PA-C   20 mg at 07/10/25 2117    [START ON 7/13/2025] filgrastim-aafi (NIVESTYM) injection 480 mcg  480 mcg Subcutaneous Daily at 8 pm Consuelo Huff APRN CNP        gabapentin (NEURONTIN) capsule 400 mg  400 mg Oral TID Nery Crowell PA-C   400 mg at 07/10/25 2117    heparin lock flush 10 unit/mL injection 5-15 mL  5-15 mL  Intracatheter Q24H Nery Crowell PA-C        lisinopril (ZESTRIL) tablet 10 mg  10 mg Oral Daily Nery Crowell PA-C   10 mg at 07/10/25 0941    loratadine (CLARITIN) tablet 10 mg  10 mg Oral Daily MervatNery ahumada PA-C   10 mg at 07/10/25 0941    ondansetron (ZOFRAN) tablet 8 mg  8 mg Oral Q12H Dove, Consuelo, APRN CNP   8 mg at 07/11/25 0447    pantoprazole (PROTONIX) EC tablet 40 mg  40 mg Oral BID AC Nery Crowell PA-C   40 mg at 07/10/25 1555    PARoxetine (PAXIL) tablet 20 mg  20 mg Oral QAM Nery Crowell PA-C   20 mg at 07/10/25 0942    posaconazole (NOXAFIL) DR tablet TBEC 300 mg  300 mg Oral QAM Nery Crowell PA-C   300 mg at 07/10/25 0939    potassium chloride sebastian ER (KLOR-CON M10) CR tablet 20 mEq  20 mEq Oral Daily Nery Crowell PA-C   20 mEq at 07/10/25 0941    prednisoLONE acetate (PRED FORTE) 1 % ophthalmic susp 2 drop  2 drop Both Eyes 4x Daily Dove, Consuelo, APRN CNP   2 drop at 07/10/25 2124    sodium chloride (PF) 0.9% PF flush 10-40 mL  10-40 mL Intracatheter Q8H Nery Crowell PA-C   10 mL at 07/10/25 2114    sucralfate (CARAFATE) suspension 1 g  1 g Oral 4x Daily AC & HS Nery Crowell PA-C   1 g at 07/10/25 2116    Vitamin D3 (CHOLECALCIFEROL) tablet 25 mcg  25 mcg Oral Daily Nery Crowell PA-C   25 mcg at 07/10/25 0941     Antiinfectives  Anti-infectives (From now, onward)      Start     Dose/Rate Route Frequency Ordered Stop    07/10/25 0800  posaconazole (NOXAFIL) DR tablet TBEC 300 mg        Note to Pharmacy: PTA Sig:Take 3 tablets (300 mg) by mouth every morning.      300 mg Oral EVERY MORNING 07/09/25 1008      07/09/25 2000  acyclovir (ZOVIRAX) tablet 400 mg        Note to Pharmacy: PTA Sig:Take 1 tablet (400 mg) by mouth 2 times daily.      400 mg Oral 2 TIMES DAILY 07/09/25 1008            Data   CBC   Recent Labs   Lab 07/11/25  0448 07/10/25  0457 07/09/25  1059 07/09/25  0849   WBC 7.2 3.9* 6.8 7.0    RBC 2.62* 2.94* 2.89* 2.79*   HGB 8.2* 9.2* 9.2* 8.9*   HCT 26.8* 29.7* 28.8* 27.4*   * 101* 100 98   MCH 31.3 31.3 31.8 31.9   MCHC 30.6* 31.0* 31.9 32.5   RDW 21.2* 21.2* 22.1* 21.8*    259 260 233     CMP   Recent Labs   Lab 07/11/25  0448 07/10/25  0457 07/09/25  1059 07/09/25  0849    142 140 141   POTASSIUM 4.9 4.5 3.6 3.2*   CHLORIDE 106 106 102 103   CO2 25 25 25 23   ANIONGAP 11 11 13 15   * 166* 116* 170*   BUN 19.0 14.9 7.9 7.3   CR 0.58 0.58 0.75 0.70   GFRESTIMATED >90 >90 >90 >90   TOBIN 9.6 9.6 9.6 9.4   MAG 2.4* 2.3 2.2  --    PHOS 4.3 3.8 4.5  --    PROTTOTAL 6.7 6.9 7.2 7.0   ALBUMIN 3.8 4.0 4.0 4.0   BILITOTAL 0.3 0.4 0.4 0.4   ALKPHOS 91 101 111 114   AST 19 19 22 20   ALT 11 11 11 10     LFTs   Recent Labs   Lab 07/11/25 0448   PROTTOTAL 6.7   ALBUMIN 3.8   BILITOTAL 0.3   ALKPHOS 91   AST 19   ALT 11     Coagulation Studies No lab results found in last 7 days.

## 2025-07-12 VITALS
BODY MASS INDEX: 30.26 KG/M2 | SYSTOLIC BLOOD PRESSURE: 158 MMHG | RESPIRATION RATE: 26 BRPM | OXYGEN SATURATION: 93 % | HEIGHT: 65 IN | TEMPERATURE: 97.6 F | HEART RATE: 82 BPM | DIASTOLIC BLOOD PRESSURE: 87 MMHG | WEIGHT: 181.6 LBS

## 2025-07-12 LAB
ALBUMIN SERPL BCG-MCNC: 4 G/DL (ref 3.5–5.2)
ALP SERPL-CCNC: 90 U/L (ref 40–150)
ALT SERPL W P-5'-P-CCNC: 19 U/L (ref 0–50)
ANION GAP SERPL CALCULATED.3IONS-SCNC: 11 MMOL/L (ref 7–15)
AST SERPL W P-5'-P-CCNC: 27 U/L (ref 0–45)
BASOPHILS # BLD AUTO: 0 10E3/UL (ref 0–0.2)
BASOPHILS NFR BLD AUTO: 0 %
BILIRUB SERPL-MCNC: 0.3 MG/DL
BUN SERPL-MCNC: 22.3 MG/DL (ref 6–20)
CALCIUM SERPL-MCNC: 9.3 MG/DL (ref 8.8–10.4)
CHLORIDE SERPL-SCNC: 104 MMOL/L (ref 98–107)
CREAT SERPL-MCNC: 0.55 MG/DL (ref 0.51–0.95)
EGFRCR SERPLBLD CKD-EPI 2021: >90 ML/MIN/1.73M2
EOSINOPHIL # BLD AUTO: 0 10E3/UL (ref 0–0.7)
EOSINOPHIL NFR BLD AUTO: 0 %
ERYTHROCYTE [DISTWIDTH] IN BLOOD BY AUTOMATED COUNT: 20.9 % (ref 10–15)
GLUCOSE SERPL-MCNC: 153 MG/DL (ref 70–99)
HCO3 SERPL-SCNC: 25 MMOL/L (ref 22–29)
HCT VFR BLD AUTO: 25.6 % (ref 35–47)
HGB BLD-MCNC: 7.9 G/DL (ref 11.7–15.7)
IMM GRANULOCYTES # BLD: 0.1 10E3/UL
IMM GRANULOCYTES NFR BLD: 2 %
LDH SERPL L TO P-CCNC: 335 U/L (ref 0–250)
LYMPHOCYTES # BLD AUTO: 0.1 10E3/UL (ref 0.8–5.3)
LYMPHOCYTES NFR BLD AUTO: 1 %
MAGNESIUM SERPL-MCNC: 2.3 MG/DL (ref 1.7–2.3)
MCH RBC QN AUTO: 30.6 PG (ref 26.5–33)
MCHC RBC AUTO-ENTMCNC: 30.9 G/DL (ref 31.5–36.5)
MCV RBC AUTO: 99 FL (ref 78–100)
MONOCYTES # BLD AUTO: 0.1 10E3/UL (ref 0–1.3)
MONOCYTES NFR BLD AUTO: 1 %
NEUTROPHILS # BLD AUTO: 6.1 10E3/UL (ref 1.6–8.3)
NEUTROPHILS NFR BLD AUTO: 96 %
NRBC # BLD AUTO: 0 10E3/UL
NRBC BLD AUTO-RTO: 0 /100
PHOSPHATE SERPL-MCNC: 3.8 MG/DL (ref 2.5–4.5)
PLATELET # BLD AUTO: 210 10E3/UL (ref 150–450)
POTASSIUM SERPL-SCNC: 4.3 MMOL/L (ref 3.4–5.3)
PROT SERPL-MCNC: 6.7 G/DL (ref 6.4–8.3)
RBC # BLD AUTO: 2.58 10E6/UL (ref 3.8–5.2)
SODIUM SERPL-SCNC: 140 MMOL/L (ref 135–145)
URATE SERPL-MCNC: 4.5 MG/DL (ref 2.4–5.7)
WBC # BLD AUTO: 6.4 10E3/UL (ref 4–11)

## 2025-07-12 PROCEDURE — 258N000003 HC RX IP 258 OP 636

## 2025-07-12 PROCEDURE — 250N000011 HC RX IP 250 OP 636

## 2025-07-12 PROCEDURE — 84550 ASSAY OF BLOOD/URIC ACID: CPT

## 2025-07-12 PROCEDURE — 250N000012 HC RX MED GY IP 250 OP 636 PS 637

## 2025-07-12 PROCEDURE — 80053 COMPREHEN METABOLIC PANEL: CPT

## 2025-07-12 PROCEDURE — 83615 LACTATE (LD) (LDH) ENZYME: CPT

## 2025-07-12 PROCEDURE — 250N000013 HC RX MED GY IP 250 OP 250 PS 637

## 2025-07-12 PROCEDURE — 83735 ASSAY OF MAGNESIUM: CPT

## 2025-07-12 PROCEDURE — 99239 HOSP IP/OBS DSCHRG MGMT >30: CPT | Mod: FS

## 2025-07-12 PROCEDURE — 85004 AUTOMATED DIFF WBC COUNT: CPT

## 2025-07-12 PROCEDURE — 84100 ASSAY OF PHOSPHORUS: CPT

## 2025-07-12 RX ADMIN — LISINOPRIL 10 MG: 10 TABLET ORAL at 07:57

## 2025-07-12 RX ADMIN — Medication 25 MCG: at 07:58

## 2025-07-12 RX ADMIN — CYANOCOBALAMIN TAB 500 MCG 500 MCG: 500 TAB at 07:58

## 2025-07-12 RX ADMIN — PAROXETINE HYDROCHLORIDE 20 MG: 20 TABLET, FILM COATED ORAL at 07:57

## 2025-07-12 RX ADMIN — ACYCLOVIR 400 MG: 400 TABLET ORAL at 07:58

## 2025-07-12 RX ADMIN — Medication 5 ML: at 08:01

## 2025-07-12 RX ADMIN — BUDESONIDE AND FORMOTEROL FUMARATE DIHYDRATE 2 PUFF: 80; 4.5 AEROSOL RESPIRATORY (INHALATION) at 08:03

## 2025-07-12 RX ADMIN — DEXAMETHASONE 4 MG: 4 TABLET ORAL at 05:01

## 2025-07-12 RX ADMIN — PANTOPRAZOLE SODIUM 40 MG: 40 TABLET, DELAYED RELEASE ORAL at 07:57

## 2025-07-12 RX ADMIN — PREDNISOLONE ACETATE 2 DROP: 10 SUSPENSION/ DROPS OPHTHALMIC at 08:02

## 2025-07-12 RX ADMIN — FAMOTIDINE 20 MG: 20 TABLET, FILM COATED ORAL at 07:58

## 2025-07-12 RX ADMIN — GABAPENTIN 400 MG: 300 CAPSULE ORAL at 07:58

## 2025-07-12 RX ADMIN — POTASSIUM CHLORIDE 20 MEQ: 750 TABLET, EXTENDED RELEASE ORAL at 07:58

## 2025-07-12 RX ADMIN — LORATADINE 10 MG: 10 TABLET ORAL at 07:58

## 2025-07-12 RX ADMIN — POSACONAZOLE 300 MG: 100 TABLET, DELAYED RELEASE ORAL at 07:58

## 2025-07-12 RX ADMIN — CYTARABINE 5730 MG: 100 INJECTION, SOLUTION INTRATHECAL; INTRAVENOUS; SUBCUTANEOUS at 05:34

## 2025-07-12 RX ADMIN — ONDANSETRON HYDROCHLORIDE 8 MG: 8 TABLET, FILM COATED ORAL at 05:01

## 2025-07-12 RX ADMIN — SUCRALFATE 1 G: 1 SUSPENSION ORAL at 07:57

## 2025-07-12 ASSESSMENT — ACTIVITIES OF DAILY LIVING (ADL)
ADLS_ACUITY_SCORE: 40

## 2025-07-12 NOTE — PLAN OF CARE
"Goal Outcome Evaluation:      Plan of Care Reviewed With: patient    Overall Patient Progress: no change    Outcome Evaluation: 3034-4274        BP (!) 162/87 (BP Location: Left arm)   Pulse 82   Temp 97.6  F (36.4  C) (Oral)   Resp 18   Ht 1.638 m (5' 4.5\")   Wt 82.4 kg (181 lb 9.6 oz)   LMP 01/01/2007 (Approximate)   SpO2 97%   BMI 30.69 kg/m      Reason for admission: scheduled consolidation chemotherapy with HiDAC (C4D1=7/9/25)  Activity: independent  Pain: denies  Neuro: WDL, A&O x4, Neuro assessments remain unchanged  Cardiac: WDL, VSS  Respiratory: WDL except infrequent cough  GI/: voiding spontaneously, denies nausea, last BM 7/11  Diet: regular  Lines: PICC        Plan: anticipating discharge today after Cytarabine  "

## 2025-07-12 NOTE — PLAN OF CARE
Nursing Focus: Discharge    D: Patient discharged to home at 0930. Patient stable and accompanied by sister.    I: Discharge prescriptions sent to discharge pharmacy to be filled. All discharge medications and instructions reviewed with Alejandra by bedside RN. Patient instructed to call clinic triage nurse if she experiences a fever >100.4, uncontrolled nausea, vomiting, diarrhea, or pain; or experiences any signs or symptoms of bleeding. Other phone numbers to call with questions or concerns after discharge reviewed. PICC removed. Education completed.    A: Alejandra verbalized understanding of discharge medications and instructions. Patient will  medications at discharge pharmacy.     P: Patient to follow-up for labs and provider visit next week

## 2025-07-12 NOTE — DISCHARGE SUMMARY
[facility]  Discharge Summary  Hematology / Oncology    Date of Admission:  7/9/2025  Date of Discharge:  07/12/2025  Discharging Provider: Nery Crowell PA-C  Date of Service (when I saw the patient): 07/12/2025    Discharge Diagnoses     # AML with NPM1 mutation   # Anemia  # Risk for pancytopenia   # Recent sepsis with encephalopathy without septic shock  # GERD  # History of intermittent epigastric pain  # COPD  # Essential hypertension  # Infrarenal abdominal aortic aneurysm  # Stage 2 CKD  # Chronic migraine w/o aura  # Chronic headaches  # BROOKS  # MDD  # At risk for adjustment disorder  # Tobacco use disorder  # Degenerative disc disease  # Deconditioning  # Vitamin D deficiency   # Seasonal allergies   # Rheumatoid arthritis  # Prediabetes   # Mixed hyperlipidemia  # H/o aortic stenosis    History of Present Illness   Alejandra Villegas is a 59 year old female with a past medical history significant for COPD, migraines, rheumatoid arthritis, aortic stenosis, anxiety, and recent diagnosis of AML who is admitted for scheduled consolidation chemotherapy with HiDAC (C4D1=7/9/25). On day of discharge, patient is at baseline and looking forward to completion of consolidation chemotherapy. Denies chest pain, shortness of breath, worsening of her chronic cough, skin changes, peripheral edema, abdominal pain, nausea/vomiting/diarrhea, fevers, chills.     Prior to discharge, I reviewed with Alejandra the plan of care, including upcoming follow-up appointments and new medications. Appropriate prescriptions were sent to the discharge pharmacy, as needed. We reviewed strict discharge precautions, including reasons to call clinic triage or present to the ED, and they voiced understanding. They were provided with the clinic triage number, as well as written discharge instructions, in their discharge paperwork. Patient had an opportunity to ask questions, all of which  were answered to their stated satisfaction. On the day of discharge, patient was overall well-appearing, hemodynamically stable, and felt safe and comfortable with the plans for discharge to home with follow-up as described.    Discharge Medications  -Prednisolone eye drops    Follow Up  -Neulasta 7/14 @ GI  -Labs/Transfusions 7/14, 7/16, 7/18 @ GI  -TORSTEN visit 7/18, virtual      Next follow-up:  Future Appointments   Date Time Provider Department Center   7/14/2025  8:40 AM [facility] LAB [facility] [place]   7/14/2025 10:00 AM GH INFUSION CHAIR 1 [facility] [place]   7/16/2025  9:40 AM [facility] LAB [facility] [place]   7/18/2025  8:40 AM [facility] LAB [facility] [place]   7/18/2025  2:30 PM Consuelo Huff APRN [facility]   8/18/2025  9:10 AM [facility] LAB [facility] [place]   8/20/2025  9:30 AM Maunel Griffiths MD [facility]      Hospital Course   Alejandra Villegas was admitted on 7/9/2025.  The following problems were addressed during her hospitalization:    HEME/ONC  # AML with NPM1 mutation  Had been seen by PCP Dec 2024 w/ progressive fatigue and nausea where she was found leukopenic WBC  2.4 and neutropenic w/ ANC 0.3. Hgb 12. Was referred to local hematology/oncology clinic for further evaluation and seen by Dr. Samina Harris. BMBx 2/10/25 done at OSH showed hypercellular marrow w/ trilineage hematopoiesis w/ dyspoiesis with mildly elevated blasts of 4% on morphology. NGS w/ NPM1, IDH2, and NRAS mutations. She was admitted to [facility] 2/26/25 for further workup and management. Slides were re-reviewed by [facility] Hematopathology, who noted hypercellular marrow with 8% blasts by morphology. Despite relatively low blast percentage, findings were felt most consistent with a diagnosis AML with NPM1 mutation by WHO 2022 (which does not require a specific blast threshold). Following interdepartmental discussions and review of the available literature, patient was started on intensive induction with 7+3 (Day 1=3/5/25). Midcycle BMBx 3/19/25  with no morphologic or immunophenotypic evidence of AML. D28 BMBx completed 3/31/25 with hypercellular marrow (60 to 70%) with no overt dysplasia or increase in blasts, noted flow with 4.3% blasts of unusual phenotype; MRD- by NGS. Due to overall disposition timeline as well as patient living in [facility], preceded directly with consolidation chemotherapy with HiDAC (C1D1=4/3/25) which she tolerated well. Completed C2 HiDAC (C2D1=5/6/2025) without complications. Bone marrow biopsy completed s/p 2 cycles of HiDAC on 6/6/25 with no evidence of persistent AML; MRD- by NGS. Completed C3 HiDAC (C3D1=6/11/25) without complications. Now admitted for C4 HiDAC (C4D1=7/9/25).  - PICC placed upon admission, plan to remove prior to discharge  - Baseline cardiopulmonary studies:  - Echo w/ EF 60-65%, mild known aortic stenosis.  - EKG (2/27) with NSR, QTc 480  - CXR (4/27) Small area of new infiltrate versus atelectasis left lung base. No consolidation. R lung clear.  - Baseline viral serologies: CMV IgG+, EBV IgG+, HepB sAg-, HepB cAb-, HepB sAb-, HSV1+/2+, HIV-  - HLA Typing sent on 3/3/2025 and 4/25/2025. BMT Consult on 4/16, no immediate plans for bone marrow transplant. Patient endorses preference to avoid transplant.      Treatment plan: HiDAC (C4D1=7/9/25)  - Cytarabine 3 g/m  IV q12h D1-3   - Neulasta 6 mg D5 - to be administered at [facility], scheduled 7/14  Supportive medications: Zofran 8 mg q12h, Dexamethasone 4 mg q12h D1-3, Prednisolone eye drops QID D1-5     # Anemia  # Risk for pancytopenia  Secondary to underlying hematologic malignancy and exacerbated by recent chemotherapy. Historical pancytopenia has since recovered.  - Follow daily CBC  - Transfuse to keep Hgb >7, plt >10K  - Okay to transfuse using blood consent on file from 6/27/25 (signed 6/25/25). Indication for transfusion remains the same.     ID   # ID prophylaxis  - Acyclovir 400 mg BID  - Cefpodoxime 200 mg BID when ANC <1.0; ANC  at admission 3.3.   - Posaconazole 300 mg daily - continue given smoking history   - Note: Posaconazole copay $1.60. Trialed initially on vori, then rotated to posa x3/18 given visual hallucinations     # Recent sepsis with encephalopathy without septic shock  She presented for a RBC infusion at [facility] and on arrival she was febrile at 102.5 with altered mental status. Sepsis protocol initiated. Platelets at 49, hemoglobin at 6.0, WBC at 9.3. CT head negative. CXR with interstitial thickening, possible pneumonia. Treated with Vancomycin, Cefepime. Blood cultures remained NGTD. She remained afebrile and AMS resolved within ~2 hours. Received blood product transfusions and tolerated well. Admitted for ~24 hrs. Discharged on Levaquin 750 mg daily x 5 days.   - Upon admission, no recurrent fevers or altered mental status per patient.      GI  # GERD  # History of intermittent epigastric pain  CT C/A/P 4/14/2025 with evidence of esophagitis (thought due to reflux/recent chemotherapy) and small hiatal hernia. Symptoms have historically improved on maximal medical therapy as below.  - Continue PTA Protonix BID, Pepcid BID, Carafate QID, TUMS PRN     PULM  # COPD  Longstanding history of COPD. On admission patient reports symptoms are manageable with no recent exacerbations. Most recent PFTs (2018) were normal.  - Continue PTA Symbicort inhaler and PRN DuoNebs   - Encourage smoking cessation     CV  # Essential hypertension  Previously on lisinopril 10 mg daily, which was held due to well-managed blood pressures during prior admission. Resumed x6/12 with recent elevated readings.  - Continue PTA Lisinopril 10 mg daily   - Trend BPs     # Infrarenal abdominal aortic aneurysm  Noted incidentally on CT C/A/P (3/19/25), measuring 3.4 cm in diameter.  - Attention on follow-up imaging     RENAL/FEN  # Stage 2 CKD  Baseline Cr ~ 0.7. On admission Cr 0.75.  - Continue to trend on daily labs and encourage PO hydration      NEURO  # Chronic migraine w/o aura  # Chronic headaches  Present since childhood. Reportedly triggered by neck manipulation/movement. Reportedly well-managed with regimen below. Headaches occurring throughout admission with daily APAP use, also chronic and managed with APAP PRN at home. Patient notes that her headaches throughout prior hospitalizations have been consistent with her typical daily headaches with no reported changes in character, quality, location, severity, or frequency. She denies new associated neurological changes. May consider LP to exclude CNS leukemia, but given this reassuring history and absence of other indications for LP (no hyperleukocytosis, no monocytic phenotype, no FLT3 mutation, etc), this was historically deferred.  - APAP PRN     MISC   # BROOKS  # MDD  # At risk for adjustment disorder  - Continue PTA paroxetine, Atarax PRN     # Tobacco use disorder  Has smoked since age 16, 1.5 ppd (roughly 65 pack years). Attempting to cut back as recently as 01/2025 to 0.5 ppd. We discussed the risks of smoking during induction chemotherapy including increased risk for infection in setting of severe neutropenia that could further complicate course, placing her at risk for severe complications that could be life-threatening or even fatal. Trialed nicotine patch, then self-discontinued after reporting that she felt the nicotine patch precipitated an anxiety attack/episode of chest pain on 3/9. Has been offered a lower dose versus alternative form of NRT, which patient declined.  - Encourage smoking cessation; patient continues to smoke at this time, despite understanding/acknowledgement of the risks. Reports trying to cut down.  - Continue posaconazole ppx, regardless of ANC, given high risk for fungal pulmonary infection in the setting of ongoing tobacco abuse.     # Degenerative disc disease  Appears likely multi-level; no MRI available for review, though note that patient has previously had  epidural injections at L3-4 and L5-S1 (2016). Reported taking gabapentin 400 mg TID PRN prior to admission. Mild exacerbation noted 3/29; improved with resumption of gabapentin.  - Continue gabapentin 400 mg TID     # Deconditioning  Using cane regularly for ambulatory assistance.  - Consider PT consult while inpatient     Chronic Problems:  # Vitamin D deficiency - Continue PTA vit D supplement  # Seasonal allergies - Claritin 10 mg daily  # Rheumatoid arthritis - Patient reported trying treatment though was unable to tolerate well. Managed with Tylenol PRN. Most recent RF >650 (2/10/25). JI negative.    # Prediabetes - Last A1c 6.0 x12/9/24. Has been managing with lifestyle modifications.   # Mixed hyperlipidemia - Lipid panel has remained at goal, 1/14/25 panel w/ cholesterol 163, , LDL 92, HDL 45. - Held PTA rosuvastatin on admission  # H/o aortic stenosis - Patient noted on admission longstanding history of aortic stenosis. Pre-chemo echo w/ EF 60-65% and mild aortic stenosis and insufficiency. No previous echo to compare.    Staffed with Dr TREY Collins.    Nery Crowell PA-C  Hematology/Oncology  Pager: #[phone]    Code Status   Full Code    Primary Care Physician   Bon Lezama    Physical Exam   Vital Signs with Ranges  Temp:  [97.6  F (36.4  C)-98.2  F (36.8  C)] 97.6  F (36.4  C)  Pulse:  [76-82] 82  Resp:  [16-26] 26  BP: (126-162)/(73-87) 158/87  SpO2:  [93 %-98 %] 93 %  181 lbs 9.6 oz    Constitutional: Pleasant and cooperative female. Awake, alert, no apparent distress.  HEENT: NC/AT, EOMI, sclera clear, conjunctiva normal, oral pharynx with moist mucus membranes  Respiratory: No increased work of breathing, CTAB, no crackles or wheezing.   Cardiovascular: RRR, normal S1 and S2, no murmur noted and no edema  GI: Normal bowel sounds, soft, non-distended and non-tender  MSK: No joint effusions or gross deformities.  Skin: No bruising, bleeding, rashes, or lesions. Scatter tattoos and scars  on upper extremities.  Neurologic:  Answers questions appropriately. Moves all extremities spontaneously. Steady ambulatory gait with cane.  Psych: Calm, appropriate affect    Discharge Disposition   Discharged to home  Condition at discharge: Stable    Consultations This Hospital Stay   VASCULAR ACCESS FOR PICC PLACEMENT ADULT IP CONSULT  NURSING TO CONSULT FOR VIRTUAL CARE IP  CARE MANAGEMENT / SOCIAL WORK IP CONSULT    Discharge Orders      Reason for your hospital stay    You were admitted for your 4th cycle of consolidation chemotherapy for your AML. You tolerated this very well.     Activity    Your activity upon discharge: activity as tolerated     ADULT [facility] Specialty Follow-up and recommended labs and tests    Neulasta 7/14 @ GI  Labs/Transfusions 7/14, 7/16, 7/18 @ GI  TORSTEN visit 7/18, virtual      See MyChart for scheduling updates and changes.    Appointments on [place] and/or [facility] (with [facility] or [facility] provider or service). Call [phone] if you haven't heard regarding these appointments within 7 days of discharge.     When to contact your care team    [facility] cancer clinic triage line at [phone] for temp > or = 100.4, uncontrolled nausea/vomiting/diarrhea/constipation, unrelieved pain, bleeding not relieved with pressure, dizziness, chest pain, shortness of breath, loss of consciousness, and any new or concerning symptoms.     Discharge Instructions    New Medications:  - Prednisolone eye drops four times per day through 7/14.    You may take tylenol (acetaminophen) 325 mg every 6-8 hours as needed for pain. Maximum dose of 4000 mg in a 24 hour period. Do not take ibuprofen (advil/motrin), aspirin, or any other NSAIDs (due to low blood counts)    Continue the rest of your medications as you were taking prior to admission.     It was a pleasure to work with you during this admission! Take care, Andrey Crowell PA-C     Diet    Follow this diet upon discharge: Current Diet:Orders  Placed This Encounter      Regular Diet Adult     Discharge Medications   Current Discharge Medication List        START taking these medications    Details   prednisoLONE acetate (PRED FORTE) 1 % ophthalmic suspension Place 2 drops into both eyes 4 times daily. Through 7/14.  Qty: 5 mL, Refills: 0    Associated Diagnoses: 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)           CONTINUE these medications which have NOT CHANGED    Details   acyclovir (ZOVIRAX) 400 MG tablet Take 1 tablet (400 mg) by mouth 2 times daily.  Qty: 120 tablet, Refills: 0    Associated Diagnoses: 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)      budesonide-formoterol (SYMBICORT) 80-4.5 MCG/ACT Inhaler Inhale 2 puffs into the lungs 2 times daily - Rinse mouth well after use to prevent Thrush  Qty: 10.2 g, Refills: 11    Associated Diagnoses: COPD exacerbation (H); Pulmonary emphysema, unspecified emphysema type (H)      cyanocobalamin (VITAMIN B-12) 500 MCG tablet Take 500 mcg by mouth daily.      cyclobenzaprine (FLEXERIL) 10 MG tablet Take 1 tablet (10 mg) by mouth 3 times daily as needed for muscle spasms.  Qty: 15 tablet, Refills: 0    Associated Diagnoses: Muscle spasm      famotidine (PEPCID) 20 MG tablet Take 1 tablet (20 mg) by mouth 2 times daily.  Qty: 60 tablet, Refills: 0    Associated Diagnoses: Gastroesophageal reflux disease, unspecified whether esophagitis present      fluticasone (FLONASE) 50 MCG/ACT nasal spray Spray 2 sprays into both nostrils 2 times daily.  Qty: 15.8 mL, Refills: 0    Associated Diagnoses: Dysfunction of both eustachian tubes; Rhinitis, unspecified type      gabapentin (NEURONTIN) 400 MG capsule Take 1 capsule (400 mg) by mouth 3 times daily.  Qty: 90 capsule, Refills: 3    Associated Diagnoses: Acute myeloid leukemia in remission (H)      lisinopril (ZESTRIL) 10 MG tablet Take 1 tablet (10 mg) by mouth daily.  Qty: 90 tablet, Refills: 3    Associated Diagnoses: Acute myeloid  leukemia in remission (H)      loratadine (CLARITIN) 10 MG tablet Take 1 tablet (10 mg) by mouth daily.  Qty: 90 tablet, Refills: 3    Associated Diagnoses: 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)      pantoprazole (PROTONIX) 40 MG EC tablet Take 1 tablet (40 mg) by mouth 2 times daily (before meals).  Qty: 180 tablet, Refills: 3    Associated Diagnoses: Acute myeloid leukemia in remission (H)      PARoxetine (PAXIL) 20 MG tablet Take 1 tablet (20 mg) by mouth every morning.  Qty: 90 tablet, Refills: 3    Associated Diagnoses: Chronic anxiety      posaconazole (NOXAFIL) 100 MG DR tablet Take 3 tablets (300 mg) by mouth every morning.  Qty: 90 tablet, Refills: 0    Associated Diagnoses: Acute myeloid leukemia in remission (H)      potassium chloride ER (K-TAB) 20 MEQ CR tablet Take 1 tablet (20 mEq) by mouth daily.  Qty: 90 tablet, Refills: 3    Associated Diagnoses: Hypokalemia      prochlorperazine (COMPAZINE) 5 MG tablet Take 1 tablet (5 mg) by mouth every 6 hours as needed for nausea or vomiting.  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  Qty: 90 tablet, Refills: 3    Associated Diagnoses: Mixed hyperlipidemia      sucralfate (CARAFATE) 1 GM/10ML suspension Take 10 mLs (1 g) by mouth 4 times daily (before meals and nightly).    Associated Diagnoses: Acute myeloid leukemia in remission (H)      Vitamin D3 (CHOLECALCIFEROL) 25 mcg (1000 units) tablet Take 1 tablet (25 mcg) by mouth daily.  Qty: 90 tablet, Refills: 3    Associated Diagnoses: Vitamin D deficiency      acetaminophen (TYLENOL) 325 MG tablet Take 2 tablets (650 mg) by mouth every 4 hours as needed for mild pain.  Qty: 90 tablet, Refills: 0    Associated Diagnoses: Acute myeloid leukemia in remission (H)      albuterol (PROAIR HFA/PROVENTIL HFA/VENTOLIN HFA) 108 (90 Base) MCG/ACT inhaler  Inhale 1-2 puffs into the lungs 4 times daily as needed (Refractory bronchospasm associated with hypersensitivity reaction).  Qty: 18 g, Refills: 0    Comments: Pharmacy may dispense brand covered by insurance (Proair, or proventil or ventolin or generic albuterol inhaler)  Associated Diagnoses: 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)      albuterol (PROVENTIL) (2.5 MG/3ML) 0.083% neb solution Take 1 vial (2.5 mg) by nebulization 4 times daily as needed (Refractory bronchospasm associated with hypersensitivity reaction).  Qty: 90 mL, Refills: 0    Associated Diagnoses: 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)      calcium carbonate (TUMS) 500 MG chewable tablet Take 1 tablet (500 mg) by mouth 3 times daily as needed for heartburn.  Qty: 90 tablet, Refills: 0    Associated Diagnoses: Gastroesophageal reflux disease, unspecified whether esophagitis present      hydrOXYzine HCl (ATARAX) 25 MG tablet Take 1-2 tablets (25-50 mg) by mouth every 6 hours as needed for anxiety or itching (adjuvant pain, sleep).  Qty: 90 tablet, Refills: 3    Associated Diagnoses: 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)      ondansetron (ZOFRAN ODT) 4 MG ODT tab Take 1-2 tablets (4-8 mg) by mouth every 8 hours as needed for nausea or vomiting.  Qty: 45 tablet, Refills: 3    Associated Diagnoses: Nausea and vomiting, unspecified vomiting type      order for DME Equipment being ordered: home neb set up with mask and tubing  Qty: 1 Device, Refills: 0    Associated Diagnoses: Bronchitis, asthmatic, mild persistent, with acute exacerbation      SUMAtriptan (IMITREX) 50 MG tablet Take 1 tablet (50 mg) by mouth at onset of headache for migraine.  Qty: 30 tablet, Refills: 0    Associated Diagnoses: Other migraine without status migrainosus, not intractable           Allergies   Allergies   Allergen Reactions    Bee Pollen Swelling     Seasonal    Adhesive Tape Rash    Amoxicillin Rash    Chlorhexidine  Rash     After several weeks, started to develop rash on R neck down to chest and arm. Also noted on back of knees. Providers suggesting related to CHG as nothing else is new for pt.     Folic Acid Itching    Liquid Adhesive Rash    Meloxicam Rash    Nabumetone GI Disturbance     GI upset    Pollen Extract      Seasonal       Data   Most Recent 3 CBC's:  Recent Labs   Lab Test 07/12/25  0502 07/11/25 0448 07/10/25  0457   WBC 6.4 7.2 3.9*   HGB 7.9* 8.2* 9.2*   MCV 99 102* 101*    234 259      Most Recent 3 BMP's:  Recent Labs   Lab Test 07/12/25  0502 07/11/25  0448 07/10/25  0457    142 142   POTASSIUM 4.3 4.9 4.5   CHLORIDE 104 106 106   CO2 25 25 25   BUN 22.3* 19.0 14.9   CR 0.55 0.58 0.58   ANIONGAP 11 11 11   OTBIN 9.3 9.6 9.6   * 149* 166*     Most Recent 2 LFT's:  Recent Labs   Lab Test 07/12/25  0502 07/11/25  0448   AST 27 19   ALT 19 11   ALKPHOS 90 91   BILITOTAL 0.3 0.3     Most Recent INR's and Anticoagulation Dosing History:  Anticoagulation Dose History  More data exists         Latest Ref Rng & Units 4/4/2025 5/6/2025 5/7/2025 5/8/2025 5/9/2025 5/18/2025 6/11/2025   Recent Dosing and Labs   INR 0.85 - 1.15 1.11  1.11  1.10  1.08  1.07  1.25  1.10      Most Recent 3 Troponin's:No lab results found.  Most Recent Cholesterol Panel:  Recent Labs   Lab Test 01/14/25  1052   CHOL 163   LDL 92   HDL 45*   TRIG 130     Most Recent 6 Bacteria Isolates From Any Culture (See EPIC Reports for Culture Details):No lab results found.  Most Recent TSH, T4 and A1c Labs:  Recent Labs   Lab Test 12/09/24  1513   A1C 6.0*

## 2025-07-14 ENCOUNTER — LAB (OUTPATIENT)
Dept: LAB | Facility: OTHER | Age: 60
End: 2025-07-14
Payer: MEDICARE

## 2025-07-14 ENCOUNTER — HOSPITAL ENCOUNTER (OUTPATIENT)
Dept: INFUSION THERAPY | Facility: OTHER | Age: 60
Discharge: HOME | End: 2025-07-14
Payer: MEDICARE

## 2025-07-14 DIAGNOSIS — C92.00 ACUTE MYELOID LEUKEMIA NOT HAVING ACHIEVED REMISSION (H): ICD-10-CM

## 2025-07-14 DIAGNOSIS — C92.00 ACUTE MYELOID LEUKEMIA NOT HAVING ACHIEVED REMISSION (H): Primary | ICD-10-CM

## 2025-07-14 DIAGNOSIS — Z09 HOSPITAL DISCHARGE FOLLOW-UP: ICD-10-CM

## 2025-07-14 LAB
ALBUMIN SERPL BCG-MCNC: 4 G/DL (ref 3.5–5.2)
ALP SERPL-CCNC: 88 U/L (ref 40–150)
ALT SERPL W P-5'-P-CCNC: 32 U/L (ref 0–50)
ANION GAP SERPL CALCULATED.3IONS-SCNC: 11 MMOL/L (ref 7–15)
AST SERPL W P-5'-P-CCNC: 24 U/L (ref 0–45)
BASOPHILS # BLD AUTO: 0 10E3/UL (ref 0–0.2)
BASOPHILS NFR BLD AUTO: 0 %
BILIRUB SERPL-MCNC: 0.5 MG/DL
BUN SERPL-MCNC: 18.1 MG/DL (ref 6–20)
CALCIUM SERPL-MCNC: 9.1 MG/DL (ref 8.8–10.4)
CHLORIDE SERPL-SCNC: 103 MMOL/L (ref 98–107)
CREAT SERPL-MCNC: 0.69 MG/DL (ref 0.51–0.95)
EGFRCR SERPLBLD CKD-EPI 2021: >90 ML/MIN/1.73M2
EOSINOPHIL # BLD AUTO: 0 10E3/UL (ref 0–0.7)
EOSINOPHIL NFR BLD AUTO: 0 %
ERYTHROCYTE [DISTWIDTH] IN BLOOD BY AUTOMATED COUNT: 19.7 % (ref 10–15)
GLUCOSE SERPL-MCNC: 98 MG/DL (ref 70–99)
HCO3 SERPL-SCNC: 26 MMOL/L (ref 22–29)
HCT VFR BLD AUTO: 25.2 % (ref 35–47)
HGB BLD-MCNC: 8.1 G/DL (ref 11.7–15.7)
IMM GRANULOCYTES # BLD: 0 10E3/UL
IMM GRANULOCYTES NFR BLD: 1 %
LYMPHOCYTES # BLD AUTO: 0.3 10E3/UL (ref 0.8–5.3)
LYMPHOCYTES NFR BLD AUTO: 8 %
MCH RBC QN AUTO: 31.6 PG (ref 26.5–33)
MCHC RBC AUTO-ENTMCNC: 32.1 G/DL (ref 31.5–36.5)
MCV RBC AUTO: 98 FL (ref 78–100)
MONOCYTES # BLD AUTO: 0 10E3/UL (ref 0–1.3)
MONOCYTES NFR BLD AUTO: 0 %
NEUTROPHILS # BLD AUTO: 3.6 10E3/UL (ref 1.6–8.3)
NEUTROPHILS NFR BLD AUTO: 91 %
NRBC # BLD AUTO: 0 10E3/UL
NRBC BLD AUTO-RTO: 0 /100
PLAT MORPH BLD: NORMAL
PLATELET # BLD AUTO: 191 10E3/UL (ref 150–450)
POTASSIUM SERPL-SCNC: 4 MMOL/L (ref 3.4–5.3)
PROT SERPL-MCNC: 6.6 G/DL (ref 6.4–8.3)
RBC # BLD AUTO: 2.56 10E6/UL (ref 3.8–5.2)
RBC MORPH BLD: NORMAL
SODIUM SERPL-SCNC: 140 MMOL/L (ref 135–145)
WBC # BLD AUTO: 4 10E3/UL (ref 4–11)

## 2025-07-14 PROCEDURE — 85004 AUTOMATED DIFF WBC COUNT: CPT | Mod: ZL

## 2025-07-14 PROCEDURE — 250N000011 HC RX IP 250 OP 636: Mod: JZ

## 2025-07-14 PROCEDURE — 36415 COLL VENOUS BLD VENIPUNCTURE: CPT | Mod: ZL

## 2025-07-14 PROCEDURE — 84155 ASSAY OF PROTEIN SERUM: CPT | Mod: ZL

## 2025-07-14 PROCEDURE — 96372 THER/PROPH/DIAG INJ SC/IM: CPT

## 2025-07-14 RX ADMIN — PEGFILGRASTIM 6 MG: 6 INJECTION SUBCUTANEOUS at 10:27

## 2025-07-14 NOTE — NURSING NOTE
Infusion Nursing Note:  Alejandra ORTIZ Masharommel presents today for Neulasta/Labs.    Patient seen by provider today: No   present during visit today: Not Applicable.    Note: N/A.    Intravenous Access:  Labs drawn by .    Treatment Conditions:  Lab Results   Component Value Date    HGB 8.1 (L) 07/14/2025    WBC 4.0 07/14/2025    ANEU 3.6 07/14/2025     07/14/2025     Lab Results   Component Value Date     07/14/2025    POTASSIUM 4.0 07/14/2025    MAG 2.3 07/12/2025    CR 0.69 07/14/2025    TOBIN 9.1 07/14/2025    BILITOTAL 0.5 07/14/2025    ALBUMIN 4.0 07/14/2025    ALT 32 07/14/2025    AST 24 07/14/2025     Post Infusion Assessment:  Patient tolerated injection in [place] without incident.     Discharge Plan:   Discharge instructions reviewed with: Patient.  Patient and/or family verbalized understanding of discharge instructions and all questions answered.  Copy of AVS declined by patient and/or family.  Patient has no future appointments at this time with us.   AVS to patient via DeeplinkT.     Patient discharged in stable condition accompanied by: self.  Departure Mode: Ambulatory.      Mey Burns RN

## 2025-07-15 ENCOUNTER — TELEPHONE (OUTPATIENT)
Dept: PHARMACY | Facility: OTHER | Age: 60
End: 2025-07-15
Payer: MEDICARE

## 2025-07-15 NOTE — TELEPHONE ENCOUNTER
MTM referral from: Transitions of Care (recent hospital discharge, TCU discharge, or ED visit)    MTM referral outreach attempt #1 on July 15, 2025 at 1:37 PM      Outcome: Spoke with patient declined    Use grand I, Use VBC, tim diacharged 7/12 for the carrier/Plan on the flowsheet          Tamara Lyons [facility]  -[facility]  [phone]

## 2025-07-16 ENCOUNTER — LAB (OUTPATIENT)
Dept: LAB | Facility: OTHER | Age: 60
End: 2025-07-16
Payer: MEDICARE

## 2025-07-16 ENCOUNTER — TELEPHONE (OUTPATIENT)
Dept: ONCOLOGY | Facility: OTHER | Age: 60
End: 2025-07-16

## 2025-07-16 DIAGNOSIS — C92.00 ACUTE MYELOID LEUKEMIA NOT HAVING ACHIEVED REMISSION (H): ICD-10-CM

## 2025-07-16 DIAGNOSIS — C92.00 ACUTE MYELOID LEUKEMIA NOT HAVING ACHIEVED REMISSION (H): Primary | ICD-10-CM

## 2025-07-16 LAB
ALBUMIN SERPL BCG-MCNC: 4.1 G/DL (ref 3.5–5.2)
ALP SERPL-CCNC: 95 U/L (ref 40–150)
ALT SERPL W P-5'-P-CCNC: 25 U/L (ref 0–50)
ANION GAP SERPL CALCULATED.3IONS-SCNC: 11 MMOL/L (ref 7–15)
AST SERPL W P-5'-P-CCNC: 18 U/L (ref 0–45)
BASOPHILS # BLD AUTO: 0 10E3/UL (ref 0–0.2)
BASOPHILS NFR BLD AUTO: 1 %
BILIRUB SERPL-MCNC: 0.8 MG/DL
BUN SERPL-MCNC: 23.3 MG/DL (ref 6–20)
CALCIUM SERPL-MCNC: 9.5 MG/DL (ref 8.8–10.4)
CHLORIDE SERPL-SCNC: 104 MMOL/L (ref 98–107)
CREAT SERPL-MCNC: 0.64 MG/DL (ref 0.51–0.95)
EGFRCR SERPLBLD CKD-EPI 2021: >90 ML/MIN/1.73M2
EOSINOPHIL # BLD AUTO: 0 10E3/UL (ref 0–0.7)
EOSINOPHIL NFR BLD AUTO: 0 %
ERYTHROCYTE [DISTWIDTH] IN BLOOD BY AUTOMATED COUNT: 19.2 % (ref 10–15)
GLUCOSE SERPL-MCNC: 104 MG/DL (ref 70–99)
HCO3 SERPL-SCNC: 26 MMOL/L (ref 22–29)
HCT VFR BLD AUTO: 27 % (ref 35–47)
HGB BLD-MCNC: 8.6 G/DL (ref 11.7–15.7)
IMM GRANULOCYTES # BLD: 0 10E3/UL
IMM GRANULOCYTES NFR BLD: 3 %
LYMPHOCYTES # BLD AUTO: 0.2 10E3/UL (ref 0.8–5.3)
LYMPHOCYTES NFR BLD AUTO: 17 %
MCH RBC QN AUTO: 31 PG (ref 26.5–33)
MCHC RBC AUTO-ENTMCNC: 31.9 G/DL (ref 31.5–36.5)
MCV RBC AUTO: 98 FL (ref 78–100)
MONOCYTES # BLD AUTO: 0 10E3/UL (ref 0–1.3)
MONOCYTES NFR BLD AUTO: 0 %
NEUTROPHILS # BLD AUTO: 0.7 10E3/UL (ref 1.6–8.3)
NEUTROPHILS NFR BLD AUTO: 78 %
NRBC # BLD AUTO: 0 10E3/UL
NRBC BLD AUTO-RTO: 0 /100
PLAT MORPH BLD: NORMAL
PLATELET # BLD AUTO: 121 10E3/UL (ref 150–450)
POTASSIUM SERPL-SCNC: 4.3 MMOL/L (ref 3.4–5.3)
PROT SERPL-MCNC: 6.8 G/DL (ref 6.4–8.3)
RBC # BLD AUTO: 2.77 10E6/UL (ref 3.8–5.2)
RBC MORPH BLD: NORMAL
SODIUM SERPL-SCNC: 141 MMOL/L (ref 135–145)
WBC # BLD AUTO: 0.9 10E3/UL (ref 4–11)

## 2025-07-16 PROCEDURE — 36415 COLL VENOUS BLD VENIPUNCTURE: CPT | Mod: ZL

## 2025-07-16 PROCEDURE — 85025 COMPLETE CBC W/AUTO DIFF WBC: CPT | Mod: ZL

## 2025-07-16 PROCEDURE — 82040 ASSAY OF SERUM ALBUMIN: CPT | Mod: ZL

## 2025-07-16 RX ORDER — CEFPODOXIME PROXETIL 200 MG/1
200 TABLET, FILM COATED ORAL 2 TIMES DAILY
Status: CANCELLED | OUTPATIENT
Start: 2025-07-16

## 2025-07-16 RX ORDER — FAMOTIDINE 20 MG/1
20 TABLET, FILM COATED ORAL 2 TIMES DAILY
Qty: 180 TABLET | Refills: 3 | Status: SHIPPED | OUTPATIENT
Start: 2025-07-16

## 2025-07-16 RX ORDER — CEFPODOXIME PROXETIL 200 MG/1
200 TABLET, FILM COATED ORAL 2 TIMES DAILY
Qty: 60 TABLET | Refills: 0 | Status: SHIPPED | OUTPATIENT
Start: 2025-07-16

## 2025-07-16 NOTE — TELEPHONE ENCOUNTER
DATE/TIME OF CALL RECEIVED FROM LAB:  07/16/25 at 10:28 AM   LAB TEST:  CBC  LAB VALUE:     WBC  0.9  Hgb 8.6  Plt 121  ANC  0.7  PROVIDER NOTIFIED?: Yes  PROVIDER NAME: Kimberly  DATE/TIME LAB VALUE REPORTED TO PROVIDER: now  MECHANISM OF PROVIDER NOTIFICATION: Phone Call  PROVIDER RESPONSE: Pending.  Nilda Chavis CMA(AAMA)..................7/16/2025   10:29 AM

## 2025-07-16 NOTE — TELEPHONE ENCOUNTER
[id number] sent Rx request for the following:      Requested Prescriptions   Pending Prescriptions Disp Refills    famotidine (PEPCID) 20 MG tablet [Pharmacy Med Name: FAMOTIDINE 20MG TABLET] 60 tablet 0     Sig: TAKE 1 TABLET (20 MG) BY MOUTH 2 TIMES DAILY.       H2 Blockers Protocol Passed - 7/16/2025  9:44 AM      Last Prescription Date:   6/25/2025  Last Fill Qty/Refills:         60, R-0    Last Office Visit:              6/25/2025     Future Office visit:           none       Prescription approved per [facility] Refill Protocol.  Clayton Barron RN on 7/16/2025 at 9:46 AM

## 2025-07-16 NOTE — TELEPHONE ENCOUNTER
Per result note on lab:    Samina Harris MD to  [facility] [place]  (Selected Message)  7/16/25 10:35 AM  Result Note  Please discuss neutropenic precautions with her and also check if she is taking Levaquin I do not see it on her medication list      Per her [facility] discharge on 7/12/25:  Discharged on Levaquin 750 mg daily x 5 days.     She was also instructed:  - Acyclovir 400 mg BID  - Cefpodoxime 200 mg BID when ANC <1.0; ANC at admission 3.3.     Levaquin and Cefpodoxime are not on her medication list.     Nilda Chavis CMA(AAMA)..................7/16/2025   10:57 AM

## 2025-07-16 NOTE — TELEPHONE ENCOUNTER
"Per [facility] \"Dr. Harris can prescribe  Cefpodoxime \" 200 mg BID  and decide how long she stays on med for low ANC.  "

## 2025-07-16 NOTE — TELEPHONE ENCOUNTER
She has taken before without side effects. Pt advised that she will remain on cefpodoxime until her ANC improved to greater than 1.0  Lisha Winkler RN...........7/16/2025 2:11 PM

## 2025-07-16 NOTE — TELEPHONE ENCOUNTER
Alejandra called back. She has left a message for Dr Collins to call us regarding what he wants to do with her medications.     Nilda Chavis CMA([facility])..................7/16/2025   11:25 AM

## 2025-07-18 ENCOUNTER — LAB (OUTPATIENT)
Dept: LAB | Facility: OTHER | Age: 60
End: 2025-07-18
Payer: MEDICARE

## 2025-07-18 ENCOUNTER — TELEPHONE (OUTPATIENT)
Dept: ONCOLOGY | Facility: OTHER | Age: 60
End: 2025-07-18

## 2025-07-18 DIAGNOSIS — C92.00 ACUTE MYELOID LEUKEMIA NOT HAVING ACHIEVED REMISSION (H): ICD-10-CM

## 2025-07-18 LAB
ALBUMIN SERPL BCG-MCNC: 4.1 G/DL (ref 3.5–5.2)
ALP SERPL-CCNC: 101 U/L (ref 40–150)
ALT SERPL W P-5'-P-CCNC: 20 U/L (ref 0–50)
ANION GAP SERPL CALCULATED.3IONS-SCNC: 12 MMOL/L (ref 7–15)
AST SERPL W P-5'-P-CCNC: 18 U/L (ref 0–45)
BILIRUB SERPL-MCNC: 0.6 MG/DL
BUN SERPL-MCNC: 16 MG/DL (ref 6–20)
CALCIUM SERPL-MCNC: 9.6 MG/DL (ref 8.8–10.4)
CHLORIDE SERPL-SCNC: 105 MMOL/L (ref 98–107)
CREAT SERPL-MCNC: 0.65 MG/DL (ref 0.51–0.95)
EGFRCR SERPLBLD CKD-EPI 2021: >90 ML/MIN/1.73M2
ERYTHROCYTE [DISTWIDTH] IN BLOOD BY AUTOMATED COUNT: 18.4 % (ref 10–15)
GLUCOSE SERPL-MCNC: 102 MG/DL (ref 70–99)
HCO3 SERPL-SCNC: 26 MMOL/L (ref 22–29)
HCT VFR BLD AUTO: 25 % (ref 35–47)
HGB BLD-MCNC: 8.1 G/DL (ref 11.7–15.7)
MCH RBC QN AUTO: 31.6 PG (ref 26.5–33)
MCHC RBC AUTO-ENTMCNC: 32.4 G/DL (ref 31.5–36.5)
MCV RBC AUTO: 98 FL (ref 78–100)
NRBC # BLD AUTO: 0 10E3/UL
NRBC BLD AUTO-RTO: 0 /100
PLATELET # BLD AUTO: 51 10E3/UL (ref 150–450)
POTASSIUM SERPL-SCNC: 4.1 MMOL/L (ref 3.4–5.3)
PROT SERPL-MCNC: 6.9 G/DL (ref 6.4–8.3)
RBC # BLD AUTO: 2.56 10E6/UL (ref 3.8–5.2)
SODIUM SERPL-SCNC: 143 MMOL/L (ref 135–145)
WBC # BLD AUTO: 0.1 10E3/UL (ref 4–11)

## 2025-07-18 PROCEDURE — 84155 ASSAY OF PROTEIN SERUM: CPT | Mod: ZL

## 2025-07-18 PROCEDURE — 36415 COLL VENOUS BLD VENIPUNCTURE: CPT | Mod: ZL

## 2025-07-18 PROCEDURE — 85014 HEMATOCRIT: CPT | Mod: ZL

## 2025-07-18 NOTE — TELEPHONE ENCOUNTER
DATE/TIME OF CALL RECEIVED FROM LAB:  07/18/25 at 9:50 AM   LAB TEST:  WBC  LAB VALUE:  0.1  PROVIDER NOTIFIED?: Yes  PROVIDER NAME: Dr. Harris   DATE/TIME LAB VALUE REPORTED TO PROVIDER: 9:51 AM   MECHANISM OF PROVIDER NOTIFICATION: phone note   She is currently on Cefpodoxime for ANC less than 0.1. do you want her to continue until greater than 0.1?  PROVIDER RESPONSE: pending

## 2025-07-20 LAB
ABO + RH BLD: NORMAL
BLD GP AB SCN SERPL QL: NEGATIVE
SPECIMEN EXP DATE BLD: NORMAL

## 2025-07-21 ENCOUNTER — LAB (OUTPATIENT)
Dept: LAB | Facility: OTHER | Age: 60
End: 2025-07-21
Payer: MEDICARE

## 2025-07-21 ENCOUNTER — HOSPITAL ENCOUNTER (OUTPATIENT)
Facility: OTHER | Age: 60
Discharge: LEFT BEFORE DISCHARGE | End: 2025-07-21
Attending: STUDENT IN AN ORGANIZED HEALTH CARE EDUCATION/TRAINING PROGRAM | Admitting: STUDENT IN AN ORGANIZED HEALTH CARE EDUCATION/TRAINING PROGRAM
Payer: MEDICARE

## 2025-07-21 ENCOUNTER — TELEPHONE (OUTPATIENT)
Dept: ONCOLOGY | Facility: OTHER | Age: 60
End: 2025-07-21

## 2025-07-21 VITALS
DIASTOLIC BLOOD PRESSURE: 78 MMHG | HEART RATE: 87 BPM | TEMPERATURE: 100.4 F | SYSTOLIC BLOOD PRESSURE: 139 MMHG | OXYGEN SATURATION: 96 % | RESPIRATION RATE: 20 BRPM

## 2025-07-21 DIAGNOSIS — C92.00 ACUTE MYELOID LEUKEMIA NOT HAVING ACHIEVED REMISSION (H): ICD-10-CM

## 2025-07-21 DIAGNOSIS — C92.00 ACUTE MYELOID LEUKEMIA NOT HAVING ACHIEVED REMISSION (H): Primary | ICD-10-CM

## 2025-07-21 DIAGNOSIS — D53.9 MACROCYTIC ANEMIA: ICD-10-CM

## 2025-07-21 LAB
ALBUMIN SERPL BCG-MCNC: 3.9 G/DL (ref 3.5–5.2)
ALP SERPL-CCNC: 110 U/L (ref 40–150)
ALT SERPL W P-5'-P-CCNC: 19 U/L (ref 0–50)
ANION GAP SERPL CALCULATED.3IONS-SCNC: 14 MMOL/L (ref 7–15)
AST SERPL W P-5'-P-CCNC: 20 U/L (ref 0–45)
BASOPHILS # BLD MANUAL: 0 10E3/UL (ref 0–0.2)
BASOPHILS NFR BLD MANUAL: 0 %
BILIRUB SERPL-MCNC: 0.5 MG/DL
BLD PROD TYP BPU: NORMAL
BLOOD COMPONENT TYPE: NORMAL
BUN SERPL-MCNC: 14.5 MG/DL (ref 6–20)
CALCIUM SERPL-MCNC: 9.3 MG/DL (ref 8.8–10.4)
CHLORIDE SERPL-SCNC: 105 MMOL/L (ref 98–107)
CO COMPONENTS: NORMAL
CODING SYSTEM: NORMAL
CREAT SERPL-MCNC: 0.61 MG/DL (ref 0.51–0.95)
CROSSMATCH: NORMAL
EGFRCR SERPLBLD CKD-EPI 2021: >90 ML/MIN/1.73M2
EOSINOPHIL # BLD MANUAL: 0 10E3/UL (ref 0–0.7)
EOSINOPHIL NFR BLD MANUAL: 4 %
ERYTHROCYTE [DISTWIDTH] IN BLOOD BY AUTOMATED COUNT: 17.5 % (ref 10–15)
GLUCOSE SERPL-MCNC: 126 MG/DL (ref 70–99)
GRAM STAIN RESULT: NORMAL
GRAM/CULTURE INDICATED?: YES
HCO3 SERPL-SCNC: 24 MMOL/L (ref 22–29)
HCT VFR BLD AUTO: 21.6 % (ref 35–47)
HGB BLD-MCNC: 7 G/DL (ref 11.7–15.7)
ISSUE DATE AND TIME: NORMAL
LYMPHOCYTES # BLD MANUAL: 0.2 10E3/UL (ref 0.8–5.3)
LYMPHOCYTES NFR BLD MANUAL: 36 %
MCH RBC QN AUTO: 30.7 PG (ref 26.5–33)
MCHC RBC AUTO-ENTMCNC: 32.4 G/DL (ref 31.5–36.5)
MCV RBC AUTO: 95 FL (ref 78–100)
METAMYELOCYTES # BLD MANUAL: 0 10E3/UL
METAMYELOCYTES NFR BLD MANUAL: 4 %
MONOCYTES # BLD MANUAL: 0 10E3/UL (ref 0–1.3)
MONOCYTES NFR BLD MANUAL: 6 %
MYELOCYTES # BLD MANUAL: 0 10E3/UL
MYELOCYTES NFR BLD MANUAL: 2 %
NEUTROPHILS # BLD MANUAL: 0.3 10E3/UL (ref 1.6–8.3)
NEUTROPHILS NFR BLD MANUAL: 48 %
OTHER UNITS TRANSFUSED: NORMAL
PLAT MORPH BLD: NORMAL
PLATELET # BLD AUTO: 3 10E3/UL (ref 150–450)
POST RXN CLERICAL CHECK: NORMAL
POST SPECIMEN APPEARANCE: NORMAL
POST-RXN ABO/RH: NORMAL
POST-RXN POLY DAT: NEGATIVE
POTASSIUM SERPL-SCNC: 3.4 MMOL/L (ref 3.4–5.3)
PRODUCT CODE: NORMAL
PROT SERPL-MCNC: 6.8 G/DL (ref 6.4–8.3)
RBC # BLD AUTO: 2.28 10E6/UL (ref 3.8–5.2)
RBC MORPH BLD: NORMAL
SODIUM SERPL-SCNC: 143 MMOL/L (ref 135–145)
SPECIMEN EXP DATE BLD: NORMAL
UNIT ABO/RH: NORMAL
UNIT BLOOD TYPE TRANSFUSION REACTION: NORMAL
UNIT NUMBER: NORMAL
UNIT STATUS: NORMAL
UNIT TYPE ISBT: 5100
WBC # BLD AUTO: 0.6 10E3/UL (ref 4–11)

## 2025-07-21 PROCEDURE — 85041 AUTOMATED RBC COUNT: CPT | Mod: ZL

## 2025-07-21 PROCEDURE — 86850 RBC ANTIBODY SCREEN: CPT

## 2025-07-21 PROCEDURE — 36430 TRANSFUSION BLD/BLD COMPNT: CPT

## 2025-07-21 PROCEDURE — 36415 COLL VENOUS BLD VENIPUNCTURE: CPT | Mod: ZL

## 2025-07-21 PROCEDURE — P9040 RBC LEUKOREDUCED IRRADIATED: HCPCS | Performed by: INTERNAL MEDICINE

## 2025-07-21 PROCEDURE — 85007 BL SMEAR W/DIFF WBC COUNT: CPT | Mod: ZL

## 2025-07-21 PROCEDURE — 82435 ASSAY OF BLOOD CHLORIDE: CPT | Mod: ZL

## 2025-07-21 PROCEDURE — 258N000003 HC RX IP 258 OP 636: Performed by: NURSE PRACTITIONER

## 2025-07-21 PROCEDURE — 86923 COMPATIBILITY TEST ELECTRIC: CPT | Performed by: INTERNAL MEDICINE

## 2025-07-21 PROCEDURE — 258N000003 HC RX IP 258 OP 636: Performed by: INTERNAL MEDICINE

## 2025-07-21 PROCEDURE — 36415 COLL VENOUS BLD VENIPUNCTURE: CPT | Performed by: STUDENT IN AN ORGANIZED HEALTH CARE EDUCATION/TRAINING PROGRAM

## 2025-07-21 RX ORDER — EPINEPHRINE 1 MG/ML
0.3 INJECTION, SOLUTION, CONCENTRATE INTRAVENOUS EVERY 5 MIN PRN
Status: CANCELLED | OUTPATIENT
Start: 2025-07-21

## 2025-07-21 RX ORDER — DIPHENHYDRAMINE HYDROCHLORIDE 50 MG/ML
50 INJECTION, SOLUTION INTRAMUSCULAR; INTRAVENOUS
Status: CANCELLED
Start: 2025-07-21

## 2025-07-21 RX ORDER — DIPHENHYDRAMINE HYDROCHLORIDE 50 MG/ML
50 INJECTION, SOLUTION INTRAMUSCULAR; INTRAVENOUS
Status: DISCONTINUED | OUTPATIENT
Start: 2025-07-21 | End: 2025-07-21 | Stop reason: HOSPADM

## 2025-07-21 RX ORDER — HEPARIN SODIUM (PORCINE) LOCK FLUSH IV SOLN 100 UNIT/ML 100 UNIT/ML
5 SOLUTION INTRAVENOUS
Status: CANCELLED | OUTPATIENT
Start: 2025-07-21

## 2025-07-21 RX ORDER — HEPARIN SODIUM (PORCINE) LOCK FLUSH IV SOLN 100 UNIT/ML 100 UNIT/ML
5 SOLUTION INTRAVENOUS
Status: DISCONTINUED | OUTPATIENT
Start: 2025-07-21 | End: 2025-07-21 | Stop reason: HOSPADM

## 2025-07-21 RX ORDER — DIPHENHYDRAMINE HYDROCHLORIDE 50 MG/ML
50 INJECTION, SOLUTION INTRAMUSCULAR; INTRAVENOUS
Status: DISCONTINUED | OUTPATIENT
Start: 2025-07-21 | End: 2025-07-21

## 2025-07-21 RX ORDER — EPINEPHRINE 1 MG/ML
0.3 INJECTION, SOLUTION, CONCENTRATE INTRAVENOUS EVERY 5 MIN PRN
Status: DISCONTINUED | OUTPATIENT
Start: 2025-07-21 | End: 2025-07-21

## 2025-07-21 RX ORDER — HEPARIN SODIUM,PORCINE 10 UNIT/ML
5-20 VIAL (ML) INTRAVENOUS DAILY PRN
Status: CANCELLED | OUTPATIENT
Start: 2025-07-21

## 2025-07-21 RX ORDER — HEPARIN SODIUM,PORCINE 10 UNIT/ML
5-20 VIAL (ML) INTRAVENOUS DAILY PRN
Status: DISCONTINUED | OUTPATIENT
Start: 2025-07-21 | End: 2025-07-21 | Stop reason: HOSPADM

## 2025-07-21 RX ORDER — ACETAMINOPHEN 500 MG
1000 TABLET ORAL ONCE
Status: COMPLETED | OUTPATIENT
Start: 2025-07-21 | End: 2025-07-21

## 2025-07-21 RX ADMIN — SODIUM CHLORIDE 250 ML: 0.9 INJECTION, SOLUTION INTRAVENOUS at 16:54

## 2025-07-21 RX ADMIN — SODIUM CHLORIDE 250 ML: 0.9 INJECTION, SOLUTION INTRAVENOUS at 16:52

## 2025-07-21 ASSESSMENT — ACTIVITIES OF DAILY LIVING (ADL)
ADLS_ACUITY_SCORE: 58

## 2025-07-21 NOTE — TELEPHONE ENCOUNTER
DATE/TIME OF CALL RECEIVED FROM LAB:  07/21/25 at 11:40 AM   LAB TEST:  Platelets 3, Hgb 7  PROVIDER NOTIFIED?: Yes  PROVIDER NAME: Dr. Harris   DATE/TIME LAB VALUE REPORTED TO PROVIDER: 11:40 AM   MECHANISM OF PROVIDER NOTIFICATION: Phone Call  PROVIDER RESPONSE: platelet and blood transfusion

## 2025-07-21 NOTE — PROGRESS NOTES
BRIEF CROSS COVER NOTE - 7/21/2025 6:40 PM     Alejandra ORTIZ Nelly  1965  59 year old       Contacted by bedside RN for transfusion associated with fever.  Blood transfusion was stopped.  IV fluids started, blood bank contacted and will drop transfusion reaction order set.          Jorge Kolb MD....................  6:40 PM

## 2025-07-22 ENCOUNTER — PATIENT OUTREACH (OUTPATIENT)
Dept: FAMILY MEDICINE | Facility: OTHER | Age: 60
End: 2025-07-22
Payer: MEDICARE

## 2025-07-22 LAB
ABO + RH BLD: NORMAL
BLD GP AB SCN SERPL QL: NEGATIVE
SPECIMEN EXP DATE BLD: NORMAL

## 2025-07-22 NOTE — TELEPHONE ENCOUNTER
TCM not indicated for hospital discharge yesterday. Follow up with primary care not needed. Patient will see infusion/oncology tomorrow. Rosendo Cedeño RN on 7/22/2025 at 7:58 AM

## 2025-07-22 NOTE — PROGRESS NOTES
Patient requested to leave hospital AMA. Writer educated on importance of finishing transfusion as ordered. Patient declined wanting to stay and acknowledged the risk of death and other potential medical concerns if she leaves AMA. Paperwork signed and patient IV removed.

## 2025-07-22 NOTE — PROGRESS NOTES
In chart to answer questions for Lab re: transfusion reaction times. Franny Barroso RN on 7/21/2025 at 8:04 PM

## 2025-07-22 NOTE — PROGRESS NOTES
"At checking hourly vitals at 1805 on patient it was discovered that patient had a temperature of 100.7 tympanic. Blood transfusion was stopped immediately at 1805. Disconnected line and flushed IV. Monitored patient condition. Patient denied any symptoms. Notified hospitalist on the floor of temperature. Initiated blood transfusion reaction protocol. Hospitalist ordered labs. RN hand carried bag of blood and IV tubing line to lab. Delivered to [facility].    RN attempted to give Tylenol per hospitalist. Patient declined Tylenol due to stating that she took Tylenol from her sister recently while in the hospital. RN clarified that she still took outside meds from family after she was specifically told not to by other RN and that if she needed a medication while in the hospital one could be ordered for her. Patient states she took Tylenol from her sister anyway.    Notified hospitalist of patient taking outside medication. Tylenol returned to [facility] per protocol. Notified hospitalist of patient stating \"she is absolutely not staying in the hospital.\" Hospitalist stated if patient leaves before cleared to leave patient must sign AMA form.     Vitals and patient condition being monitored.     Vida Viavs RN .......  7/21/2025  7:05 PM    "

## 2025-07-22 NOTE — PROGRESS NOTES
[place] [facility] DISCHARGE NOTE    Patient discharged to home at 7:39 PM via ambulation. Accompanied by person of patient choice. AMA instructions reviewed with patient, opportunity offered to ask questions. Prescriptions - None ordered for discharge. All belongings sent with patient.    Guzman Pinto RN

## 2025-07-23 ENCOUNTER — LAB (OUTPATIENT)
Dept: LAB | Facility: OTHER | Age: 60
End: 2025-07-23
Attending: INTERNAL MEDICINE
Payer: MEDICARE

## 2025-07-23 ENCOUNTER — TELEPHONE (OUTPATIENT)
Dept: ONCOLOGY | Facility: OTHER | Age: 60
End: 2025-07-23

## 2025-07-23 ENCOUNTER — HOSPITAL ENCOUNTER (OUTPATIENT)
Dept: INFUSION THERAPY | Facility: OTHER | Age: 60
Discharge: HOME | End: 2025-07-23
Attending: INTERNAL MEDICINE
Payer: MEDICARE

## 2025-07-23 ENCOUNTER — TELEPHONE (OUTPATIENT)
Dept: FAMILY MEDICINE | Facility: OTHER | Age: 60
End: 2025-07-23

## 2025-07-23 VITALS
OXYGEN SATURATION: 92 % | DIASTOLIC BLOOD PRESSURE: 71 MMHG | TEMPERATURE: 99.2 F | SYSTOLIC BLOOD PRESSURE: 131 MMHG | HEART RATE: 86 BPM | RESPIRATION RATE: 20 BRPM

## 2025-07-23 DIAGNOSIS — C92.01 ACUTE MYELOID LEUKEMIA IN REMISSION (H): ICD-10-CM

## 2025-07-23 DIAGNOSIS — C92.00 ACUTE MYELOID LEUKEMIA NOT HAVING ACHIEVED REMISSION (H): ICD-10-CM

## 2025-07-23 DIAGNOSIS — D53.9 MACROCYTIC ANEMIA: Primary | ICD-10-CM

## 2025-07-23 DIAGNOSIS — D53.9 MACROCYTIC ANEMIA: ICD-10-CM

## 2025-07-23 LAB
ALBUMIN SERPL BCG-MCNC: 3.9 G/DL (ref 3.5–5.2)
ALP SERPL-CCNC: 171 U/L (ref 40–150)
ALT SERPL W P-5'-P-CCNC: 23 U/L (ref 0–50)
ANION GAP SERPL CALCULATED.3IONS-SCNC: 17 MMOL/L (ref 7–15)
AST SERPL W P-5'-P-CCNC: 28 U/L (ref 0–45)
BASOPHILS # BLD MANUAL: 0 10E3/UL (ref 0–0.2)
BASOPHILS NFR BLD MANUAL: 0 %
BILIRUB SERPL-MCNC: 0.8 MG/DL
BLD PROD TYP BPU: NORMAL
BLD PROD TYP BPU: NORMAL
BLOOD COMPONENT TYPE: NORMAL
BLOOD COMPONENT TYPE: NORMAL
BUN SERPL-MCNC: 13 MG/DL (ref 6–20)
CALCIUM SERPL-MCNC: 9.2 MG/DL (ref 8.8–10.4)
CHLORIDE SERPL-SCNC: 100 MMOL/L (ref 98–107)
CODING SYSTEM: NORMAL
CODING SYSTEM: NORMAL
CREAT SERPL-MCNC: 0.7 MG/DL (ref 0.51–0.95)
CROSSMATCH: NORMAL
EGFRCR SERPLBLD CKD-EPI 2021: >90 ML/MIN/1.73M2
EOSINOPHIL # BLD MANUAL: 0 10E3/UL (ref 0–0.7)
EOSINOPHIL NFR BLD MANUAL: 0 %
ERYTHROCYTE [DISTWIDTH] IN BLOOD BY AUTOMATED COUNT: 18.7 % (ref 10–15)
GLUCOSE SERPL-MCNC: 152 MG/DL (ref 70–99)
HCO3 SERPL-SCNC: 22 MMOL/L (ref 22–29)
HCT VFR BLD AUTO: 20.2 % (ref 35–47)
HGB BLD-MCNC: 6.7 G/DL (ref 11.7–15.7)
ISSUE DATE AND TIME: NORMAL
ISSUE DATE AND TIME: NORMAL
LYMPHOCYTES # BLD MANUAL: 1.1 10E3/UL (ref 0.8–5.3)
LYMPHOCYTES NFR BLD MANUAL: 8 %
MCH RBC QN AUTO: 30.7 PG (ref 26.5–33)
MCHC RBC AUTO-ENTMCNC: 33.2 G/DL (ref 31.5–36.5)
MCV RBC AUTO: 93 FL (ref 78–100)
METAMYELOCYTES # BLD MANUAL: 0.4 10E3/UL
METAMYELOCYTES NFR BLD MANUAL: 3 %
MONOCYTES # BLD MANUAL: 2.6 10E3/UL (ref 0–1.3)
MONOCYTES NFR BLD MANUAL: 19 %
MYELOCYTES # BLD MANUAL: 0.3 10E3/UL
MYELOCYTES NFR BLD MANUAL: 2 %
NEUTROPHILS # BLD MANUAL: 9.2 10E3/UL (ref 1.6–8.3)
NEUTROPHILS NFR BLD MANUAL: 68 %
PLAT MORPH BLD: NORMAL
PLATELET # BLD AUTO: 6 10E3/UL (ref 150–450)
POTASSIUM SERPL-SCNC: 3.7 MMOL/L (ref 3.4–5.3)
PROT SERPL-MCNC: 6.9 G/DL (ref 6.4–8.3)
RBC # BLD AUTO: 2.18 10E6/UL (ref 3.8–5.2)
RBC MORPH BLD: NORMAL
SODIUM SERPL-SCNC: 139 MMOL/L (ref 135–145)
UNIT ABO/RH: NORMAL
UNIT ABO/RH: NORMAL
UNIT NUMBER: NORMAL
UNIT NUMBER: NORMAL
UNIT STATUS: NORMAL
UNIT STATUS: NORMAL
UNIT TYPE ISBT: 5100
UNIT TYPE ISBT: 5100
WBC # BLD AUTO: 13.5 10E3/UL (ref 4–11)

## 2025-07-23 PROCEDURE — 85027 COMPLETE CBC AUTOMATED: CPT | Mod: ZL

## 2025-07-23 PROCEDURE — 250N000013 HC RX MED GY IP 250 OP 250 PS 637: Performed by: NURSE PRACTITIONER

## 2025-07-23 PROCEDURE — 85007 BL SMEAR W/DIFF WBC COUNT: CPT | Mod: ZL

## 2025-07-23 PROCEDURE — 80053 COMPREHEN METABOLIC PANEL: CPT | Mod: ZL

## 2025-07-23 PROCEDURE — 258N000003 HC RX IP 258 OP 636: Performed by: NURSE PRACTITIONER

## 2025-07-23 PROCEDURE — 36415 COLL VENOUS BLD VENIPUNCTURE: CPT | Mod: ZL

## 2025-07-23 PROCEDURE — P9037 PLATE PHERES LEUKOREDU IRRAD: HCPCS | Performed by: NURSE PRACTITIONER

## 2025-07-23 PROCEDURE — 86850 RBC ANTIBODY SCREEN: CPT

## 2025-07-23 PROCEDURE — P9040 RBC LEUKOREDUCED IRRADIATED: HCPCS | Performed by: NURSE PRACTITIONER

## 2025-07-23 PROCEDURE — 86923 COMPATIBILITY TEST ELECTRIC: CPT | Performed by: NURSE PRACTITIONER

## 2025-07-23 RX ORDER — HEPARIN SODIUM (PORCINE) LOCK FLUSH IV SOLN 100 UNIT/ML 100 UNIT/ML
5 SOLUTION INTRAVENOUS
Status: CANCELLED | OUTPATIENT
Start: 2025-07-23

## 2025-07-23 RX ORDER — EPINEPHRINE 1 MG/ML
0.3 INJECTION, SOLUTION, CONCENTRATE INTRAVENOUS EVERY 5 MIN PRN
Status: CANCELLED | OUTPATIENT
Start: 2025-07-23

## 2025-07-23 RX ORDER — DIPHENHYDRAMINE HYDROCHLORIDE 50 MG/ML
50 INJECTION, SOLUTION INTRAMUSCULAR; INTRAVENOUS
Status: CANCELLED
Start: 2025-07-23

## 2025-07-23 RX ORDER — ACETAMINOPHEN 500 MG
500 TABLET ORAL ONCE
Status: COMPLETED | OUTPATIENT
Start: 2025-07-23 | End: 2025-07-23

## 2025-07-23 RX ORDER — HEPARIN SODIUM,PORCINE 10 UNIT/ML
5-20 VIAL (ML) INTRAVENOUS DAILY PRN
Status: CANCELLED | OUTPATIENT
Start: 2025-07-23

## 2025-07-23 RX ADMIN — ACETAMINOPHEN 500 MG: 500 TABLET, FILM COATED ORAL at 12:55

## 2025-07-23 RX ADMIN — SODIUM CHLORIDE 250 ML: 0.9 INJECTION, SOLUTION INTRAVENOUS at 12:21

## 2025-07-23 NOTE — NURSING NOTE
Infusion Nursing Note:  Alejandra Villegas presents today for labs with indication  of platelets/blood product.    Patient seen by provider today: No   present during visit today: Not Applicable.    Note: Patient educated on need for blood and that blood products will come from out of town.  Oxygen decreased to 82 % without oxygen.  Obtained order for oxygen from [facility] NP with oxygen level varying from 88-91 on 2 liters.  Patient reports she did not take am meds, but sister was going to bring meds in.   Patient alerted this nurse that with the time it takes to obtain blood,  refusing to stay in unit and will go home and wait.  This nurse highly encouraged due to low oxygen levels and low hgb/plt that is not safe for her top leave and drive.  Patient states she understands the risk and to call when blood products arrive..  This nurse educated if her condition worsen to go to ER      Intravenous Access:  Labs drawn without difficulty .      Gena Torres RN

## 2025-07-23 NOTE — TELEPHONE ENCOUNTER
Oxygen sats keeps dropping to about 82%  Patient was advised by Oncology to be seen by primary care for oxygen need.  There are no clinic openings remaining for today. Spoke with Dr. Milton Arce who recommended patient go to ER to have oxygen need evaluated.  Spoke with patient a second time and patient does not want to go to ER now. She would rather call tomorrow and see if she can get a same day appointment tomorrow.  She will go to the ER if her breathing gets too bad.   Krysten Sanchez RN on 7/23/2025 at 4:45 PM

## 2025-07-23 NOTE — TELEPHONE ENCOUNTER
Patient is in need of an appointment with Primary care provider for Respiratory/oxygen issues sooner than next available per oncology providers.   Aga Somers on 7/23/2025 at 3:40 PM

## 2025-07-23 NOTE — NURSING NOTE
Infusion Nursing Note:  Alejandra Villegas presents today for blood and platelets.    Patient seen by provider today: No   present during visit today: Not Applicable.    Note: N/A.      Intravenous Access:  Peripheral IV placed to right antecubital space with blood return.    Treatment Conditions:  Lab Results   Component Value Date    HGB 6.7 (LL) 07/23/2025    WBC 13.5 (H) 07/23/2025    ANEU 9.2 (H) 07/23/2025    PLT 6 (LL) 07/23/2025          Blood transfusion consent signed 2/13/25 via telephone consent by JULISSA NP.  Noted at the end of blood earlier this week that patient started top have a fever, with patient taking tylenol.  With further review, and interview, patient reports that she left towards the end of blood transfusion due to not wanting to be monitored and felt fine.  This nurse obtained VORB to administer Tylenol.   Patient tolerated blood without concern.    Platelets started, oxygen remains 86-93 on 1 liter oxygen.  Patient has not completed any nebs today prior to arrival.  Aware of saturation level, declining sat monitoring at this time, verbalizing she will go home after and complete nebs.      Post Infusion Assessment:  Patient tolerated infusion without incident.  Blood return noted pre and post infusion.  Site patent and intact, free from redness, edema or discomfort.  No evidence of extravasations.  Access discontinued per protocol.   Patient declined last set of vitals.  Educated to follow up with PCP and to seek medical attention with increased shortness of breathe and changes in condition.  Patient verbalized understanding      Discharge Plan:   Discharge instructions reviewed with: Patient.  Patient and/or family verbalized understanding of discharge instructions and all questions answered.  Copy of AVS reviewed with patient and/or family.  Patient will return Friday for next appointment.  Patient discharged in stable condition accompanied by: self.  Departure Mode: Ambulatory  with sister to transport.      Gena Torres RN

## 2025-07-23 NOTE — TELEPHONE ENCOUNTER
DATE/TIME OF CALL RECEIVED FROM LAB:  07/23/25 at 8:28 AM   LAB TEST:  Hgb 6.7, Platelets 6  PROVIDER NOTIFIED?: Yes  PROVIDER NAME: Magalis Lopez APRN CNP    DATE/TIME LAB VALUE REPORTED TO PROVIDER: 8:28 AM   MECHANISM OF PROVIDER NOTIFICATION: Face-To-Face  PROVIDER RESPONSE:     Will arrange blood and platelet infusion per  blood plan.  Lisha Winkler RN...........7/23/2025 8:29 AM

## 2025-07-23 NOTE — PATIENT INSTRUCTIONS
After Your Blood Transfusion  Discharge Instructions  After you leave  After you have a blood transfusion,* watch for signs of a transfusion reaction for the next 48 hours.   Signs to watch for:  Shaking or chills  Fever above 100.4 F  Headache  Nausea (feeling sich to your stomach)  Hives  Itching  Swelling of the face or feeling flushed  Ongoing dry cough (nothing is coughed up)  Trouble breathing, or wheezing  Call your clinic or 911, or go to the Emergency Room, if you have any signs of a transfusion reaction.  After the first 48 hours  Some signs of a reaction won't show up for a few days or up to 4 weeks. Call your clinic if you have symptoms of a transfusion reaction.  These may include:  Fatigue (feeling very tired)  Dizziness  Pink or red urine  *A blood transfusion is when you receive red blood cells, platelets, plasma or cryoprecipitate.                  Follow up with provider as schedule.    Seek medical attention with increased shortness of breathe and changes in condition.

## 2025-07-25 ENCOUNTER — TELEPHONE (OUTPATIENT)
Dept: ONCOLOGY | Facility: OTHER | Age: 60
End: 2025-07-25

## 2025-07-25 ENCOUNTER — LAB (OUTPATIENT)
Dept: LAB | Facility: OTHER | Age: 60
End: 2025-07-25
Attending: INTERNAL MEDICINE
Payer: MEDICARE

## 2025-07-25 DIAGNOSIS — C92.00 ACUTE MYELOID LEUKEMIA NOT HAVING ACHIEVED REMISSION (H): ICD-10-CM

## 2025-07-25 LAB
ALBUMIN SERPL BCG-MCNC: 3.7 G/DL (ref 3.5–5.2)
ALP SERPL-CCNC: 158 U/L (ref 40–150)
ALT SERPL W P-5'-P-CCNC: 17 U/L (ref 0–50)
ANION GAP SERPL CALCULATED.3IONS-SCNC: 12 MMOL/L (ref 7–15)
AST SERPL W P-5'-P-CCNC: 21 U/L (ref 0–45)
BASOPHILS # BLD MANUAL: 0 10E3/UL (ref 0–0.2)
BASOPHILS NFR BLD MANUAL: 0 %
BILIRUB SERPL-MCNC: 0.7 MG/DL
BUN SERPL-MCNC: 7.9 MG/DL (ref 6–20)
CALCIUM SERPL-MCNC: 9 MG/DL (ref 8.8–10.4)
CHLORIDE SERPL-SCNC: 104 MMOL/L (ref 98–107)
CREAT SERPL-MCNC: 0.59 MG/DL (ref 0.51–0.95)
EGFRCR SERPLBLD CKD-EPI 2021: >90 ML/MIN/1.73M2
EOSINOPHIL # BLD MANUAL: 0 10E3/UL (ref 0–0.7)
EOSINOPHIL NFR BLD MANUAL: 0 %
ERYTHROCYTE [DISTWIDTH] IN BLOOD BY AUTOMATED COUNT: 19 % (ref 10–15)
GLUCOSE SERPL-MCNC: 116 MG/DL (ref 70–99)
HCO3 SERPL-SCNC: 24 MMOL/L (ref 22–29)
HCT VFR BLD AUTO: 21.4 % (ref 35–47)
HGB BLD-MCNC: 7.2 G/DL (ref 11.7–15.7)
LYMPHOCYTES # BLD MANUAL: 0.5 10E3/UL (ref 0.8–5.3)
LYMPHOCYTES NFR BLD MANUAL: 6 %
MCH RBC QN AUTO: 30.6 PG (ref 26.5–33)
MCHC RBC AUTO-ENTMCNC: 33.6 G/DL (ref 31.5–36.5)
MCV RBC AUTO: 91 FL (ref 78–100)
METAMYELOCYTES # BLD MANUAL: 0.2 10E3/UL
METAMYELOCYTES NFR BLD MANUAL: 2 %
MONOCYTES # BLD MANUAL: 2.4 10E3/UL (ref 0–1.3)
MONOCYTES NFR BLD MANUAL: 26 %
MYELOCYTES # BLD MANUAL: 0.5 10E3/UL
MYELOCYTES NFR BLD MANUAL: 6 %
NEUTROPHILS # BLD MANUAL: 5.5 10E3/UL (ref 1.6–8.3)
NEUTROPHILS NFR BLD MANUAL: 60 %
PLAT MORPH BLD: NORMAL
PLATELET # BLD AUTO: 49 10E3/UL (ref 150–450)
POTASSIUM SERPL-SCNC: 3 MMOL/L (ref 3.4–5.3)
PROT SERPL-MCNC: 7 G/DL (ref 6.4–8.3)
RBC # BLD AUTO: 2.35 10E6/UL (ref 3.8–5.2)
RBC MORPH BLD: NORMAL
SODIUM SERPL-SCNC: 140 MMOL/L (ref 135–145)
WBC # BLD AUTO: 9.1 10E3/UL (ref 4–11)

## 2025-07-25 PROCEDURE — 85007 BL SMEAR W/DIFF WBC COUNT: CPT | Mod: ZL

## 2025-07-25 PROCEDURE — 36415 COLL VENOUS BLD VENIPUNCTURE: CPT | Mod: ZL

## 2025-07-25 PROCEDURE — 82310 ASSAY OF CALCIUM: CPT | Mod: ZL

## 2025-07-25 PROCEDURE — 85027 COMPLETE CBC AUTOMATED: CPT | Mod: ZL

## 2025-07-25 NOTE — TELEPHONE ENCOUNTER
DATE/TIME OF CALL RECEIVED FROM LAB:  07/25/25 at 8:47 AM   LAB TEST:  platelets 49  PROVIDER NOTIFIED?: Yes  PROVIDER NAME: Dr. Harris   DATE/TIME LAB VALUE REPORTED TO PROVIDER: 8:48 AM   MECHANISM OF PROVIDER NOTIFICATION: phone note  PROVIDER RESPONSE:   Note-Hgb 7.2-parameter is 7 of less-no transfusion indicated  Instructed patient to come to hospital immediately if bleeding from mouth or gums, nose bleeds, bruises on arms or legs, with or without an injury, pinpoint-size, red or purple spots on your skin, brown or red urine, black, tarry stool or bloody stool, blood in your mucus, vomiting blood, persistent headache, blurred or double vision, abdominal pain. Patient states understanding.  Return Monday for lab.  Lisha Winkler RN...........7/25/2025 8:48 AM

## 2025-07-27 LAB
BACTERIA BLD CULT: NO GROWTH
GRAM STAIN RESULT: NORMAL

## 2025-07-28 ENCOUNTER — LAB (OUTPATIENT)
Dept: LAB | Facility: OTHER | Age: 60
End: 2025-07-28
Attending: INTERNAL MEDICINE
Payer: MEDICARE

## 2025-07-28 DIAGNOSIS — C92.00 ACUTE MYELOID LEUKEMIA NOT HAVING ACHIEVED REMISSION (H): ICD-10-CM

## 2025-07-28 LAB
ALBUMIN SERPL BCG-MCNC: 3.6 G/DL (ref 3.5–5.2)
ALP SERPL-CCNC: 139 U/L (ref 40–150)
ALT SERPL W P-5'-P-CCNC: 13 U/L (ref 0–50)
ANION GAP SERPL CALCULATED.3IONS-SCNC: 14 MMOL/L (ref 7–15)
AST SERPL W P-5'-P-CCNC: 17 U/L (ref 0–45)
BASOPHILS # BLD AUTO: 0 10E3/UL (ref 0–0.2)
BASOPHILS NFR BLD AUTO: 0 %
BILIRUB SERPL-MCNC: 0.5 MG/DL
BUN SERPL-MCNC: 9.5 MG/DL (ref 6–20)
CALCIUM SERPL-MCNC: 9.1 MG/DL (ref 8.8–10.4)
CHLORIDE SERPL-SCNC: 102 MMOL/L (ref 98–107)
CREAT SERPL-MCNC: 0.54 MG/DL (ref 0.51–0.95)
EGFRCR SERPLBLD CKD-EPI 2021: >90 ML/MIN/1.73M2
EOSINOPHIL # BLD AUTO: 0.1 10E3/UL (ref 0–0.7)
EOSINOPHIL NFR BLD AUTO: 1 %
ERYTHROCYTE [DISTWIDTH] IN BLOOD BY AUTOMATED COUNT: 17.7 % (ref 10–15)
GLUCOSE SERPL-MCNC: 121 MG/DL (ref 70–99)
HCO3 SERPL-SCNC: 25 MMOL/L (ref 22–29)
HCT VFR BLD AUTO: 21.5 % (ref 35–47)
HGB BLD-MCNC: 7.3 G/DL (ref 11.7–15.7)
IMM GRANULOCYTES # BLD: 0.3 10E3/UL
IMM GRANULOCYTES NFR BLD: 3 %
LYMPHOCYTES # BLD AUTO: 0.8 10E3/UL (ref 0.8–5.3)
LYMPHOCYTES NFR BLD AUTO: 8 %
MCH RBC QN AUTO: 30.8 PG (ref 26.5–33)
MCHC RBC AUTO-ENTMCNC: 34 G/DL (ref 31.5–36.5)
MCV RBC AUTO: 91 FL (ref 78–100)
MONOCYTES # BLD AUTO: 2.5 10E3/UL (ref 0–1.3)
MONOCYTES NFR BLD AUTO: 26 %
NEUTROPHILS # BLD AUTO: 6 10E3/UL (ref 1.6–8.3)
NEUTROPHILS NFR BLD AUTO: 62 %
NRBC # BLD AUTO: 0.1 10E3/UL
NRBC BLD AUTO-RTO: 1 /100
PLAT MORPH BLD: NORMAL
PLATELET # BLD AUTO: 150 10E3/UL (ref 150–450)
POTASSIUM SERPL-SCNC: 3.2 MMOL/L (ref 3.4–5.3)
PROT SERPL-MCNC: 6.9 G/DL (ref 6.4–8.3)
RBC # BLD AUTO: 2.37 10E6/UL (ref 3.8–5.2)
RBC MORPH BLD: NORMAL
SODIUM SERPL-SCNC: 141 MMOL/L (ref 135–145)
WBC # BLD AUTO: 9.7 10E3/UL (ref 4–11)

## 2025-07-28 PROCEDURE — 36415 COLL VENOUS BLD VENIPUNCTURE: CPT | Mod: ZL

## 2025-07-28 PROCEDURE — 85025 COMPLETE CBC W/AUTO DIFF WBC: CPT | Mod: ZL

## 2025-07-28 PROCEDURE — 80053 COMPREHEN METABOLIC PANEL: CPT | Mod: ZL

## 2025-07-30 ENCOUNTER — LAB (OUTPATIENT)
Dept: LAB | Facility: OTHER | Age: 60
End: 2025-07-30
Attending: INTERNAL MEDICINE
Payer: MEDICARE

## 2025-07-30 ENCOUNTER — TELEPHONE (OUTPATIENT)
Dept: FAMILY MEDICINE | Facility: OTHER | Age: 60
End: 2025-07-30
Payer: MEDICARE

## 2025-07-30 DIAGNOSIS — C92.00 ACUTE MYELOID LEUKEMIA NOT HAVING ACHIEVED REMISSION (H): ICD-10-CM

## 2025-07-30 LAB
ALBUMIN SERPL BCG-MCNC: 3.6 G/DL (ref 3.5–5.2)
ALP SERPL-CCNC: 125 U/L (ref 40–150)
ALT SERPL W P-5'-P-CCNC: 13 U/L (ref 0–50)
ANION GAP SERPL CALCULATED.3IONS-SCNC: 13 MMOL/L (ref 7–15)
AST SERPL W P-5'-P-CCNC: 17 U/L (ref 0–45)
BASOPHILS # BLD AUTO: 0 10E3/UL (ref 0–0.2)
BASOPHILS NFR BLD AUTO: 1 %
BILIRUB SERPL-MCNC: 0.3 MG/DL
BUN SERPL-MCNC: 7.3 MG/DL (ref 6–20)
CALCIUM SERPL-MCNC: 9.2 MG/DL (ref 8.8–10.4)
CHLORIDE SERPL-SCNC: 103 MMOL/L (ref 98–107)
CREAT SERPL-MCNC: 0.58 MG/DL (ref 0.51–0.95)
EGFRCR SERPLBLD CKD-EPI 2021: >90 ML/MIN/1.73M2
EOSINOPHIL # BLD AUTO: 0 10E3/UL (ref 0–0.7)
EOSINOPHIL NFR BLD AUTO: 0 %
ERYTHROCYTE [DISTWIDTH] IN BLOOD BY AUTOMATED COUNT: 17.9 % (ref 10–15)
GLUCOSE SERPL-MCNC: 110 MG/DL (ref 70–99)
HCO3 SERPL-SCNC: 27 MMOL/L (ref 22–29)
HCT VFR BLD AUTO: 22.2 % (ref 35–47)
HGB BLD-MCNC: 7 G/DL (ref 11.7–15.7)
IMM GRANULOCYTES # BLD: 0.2 10E3/UL
IMM GRANULOCYTES NFR BLD: 2 %
LYMPHOCYTES # BLD AUTO: 0.7 10E3/UL (ref 0.8–5.3)
LYMPHOCYTES NFR BLD AUTO: 8 %
MCH RBC QN AUTO: 29.7 PG (ref 26.5–33)
MCHC RBC AUTO-ENTMCNC: 31.5 G/DL (ref 31.5–36.5)
MCV RBC AUTO: 94 FL (ref 78–100)
MONOCYTES # BLD AUTO: 2.2 10E3/UL (ref 0–1.3)
MONOCYTES NFR BLD AUTO: 25 %
NEUTROPHILS # BLD AUTO: 5.7 10E3/UL (ref 1.6–8.3)
NEUTROPHILS NFR BLD AUTO: 64 %
NRBC # BLD AUTO: 0.1 10E3/UL
NRBC BLD AUTO-RTO: 1 /100
PLAT MORPH BLD: NORMAL
PLATELET # BLD AUTO: 165 10E3/UL (ref 150–450)
POTASSIUM SERPL-SCNC: 3.5 MMOL/L (ref 3.4–5.3)
PROT SERPL-MCNC: 6.7 G/DL (ref 6.4–8.3)
RBC # BLD AUTO: 2.36 10E6/UL (ref 3.8–5.2)
RBC MORPH BLD: NORMAL
SODIUM SERPL-SCNC: 143 MMOL/L (ref 135–145)
WBC # BLD AUTO: 8.9 10E3/UL (ref 4–11)

## 2025-07-30 PROCEDURE — 85004 AUTOMATED DIFF WBC COUNT: CPT | Mod: ZL

## 2025-07-30 PROCEDURE — 82247 BILIRUBIN TOTAL: CPT | Mod: ZL

## 2025-07-30 PROCEDURE — 36415 COLL VENOUS BLD VENIPUNCTURE: CPT | Mod: ZL

## 2025-07-30 PROCEDURE — 82310 ASSAY OF CALCIUM: CPT | Mod: ZL

## 2025-07-30 NOTE — TELEPHONE ENCOUNTER
Call placed to patient and after verifying date of birth , results of CBC given to patient. Specifically Hgb at 7.0 and discussed that her blood plan states she can have a transfusion when her value is at 7 or less and she wants to wait until Friday to see if her value drops more, she said she feels ok and no dizzy spells or more short of breath. We discussed if she should have any concerns that she could call and we would fit her in to the schedule if needed before Friday.  She voiced understanding and will come for repeat lab on Friday.  Jean S. Hammann, RN on 7/30/2025 at 8:39 AM    
[Initial] : an initial consultation for

## 2025-08-01 ENCOUNTER — APPOINTMENT (OUTPATIENT)
Dept: LAB | Facility: OTHER | Age: 60
End: 2025-08-01
Attending: INTERNAL MEDICINE
Payer: MEDICARE

## 2025-08-04 ENCOUNTER — LAB (OUTPATIENT)
Dept: LAB | Facility: OTHER | Age: 60
End: 2025-08-04
Attending: INTERNAL MEDICINE
Payer: MEDICARE

## 2025-08-04 ENCOUNTER — TELEPHONE (OUTPATIENT)
Dept: FAMILY MEDICINE | Facility: OTHER | Age: 60
End: 2025-08-04

## 2025-08-04 DIAGNOSIS — C92.00 ACUTE MYELOID LEUKEMIA NOT HAVING ACHIEVED REMISSION (H): ICD-10-CM

## 2025-08-04 LAB
ALBUMIN SERPL BCG-MCNC: 3.7 G/DL (ref 3.5–5.2)
ALP SERPL-CCNC: 111 U/L (ref 40–150)
ALT SERPL W P-5'-P-CCNC: 12 U/L (ref 0–50)
ANION GAP SERPL CALCULATED.3IONS-SCNC: 13 MMOL/L (ref 7–15)
AST SERPL W P-5'-P-CCNC: 18 U/L (ref 0–45)
BASOPHILS # BLD MANUAL: 0 10E3/UL (ref 0–0.2)
BASOPHILS NFR BLD MANUAL: 0 %
BILIRUB SERPL-MCNC: 0.6 MG/DL
BUN SERPL-MCNC: 9.6 MG/DL (ref 6–20)
CALCIUM SERPL-MCNC: 9.2 MG/DL (ref 8.8–10.4)
CHLORIDE SERPL-SCNC: 102 MMOL/L (ref 98–107)
CREAT SERPL-MCNC: 0.67 MG/DL (ref 0.51–0.95)
EGFRCR SERPLBLD CKD-EPI 2021: >90 ML/MIN/1.73M2
EOSINOPHIL # BLD MANUAL: 0 10E3/UL (ref 0–0.7)
EOSINOPHIL NFR BLD MANUAL: 0 %
ERYTHROCYTE [DISTWIDTH] IN BLOOD BY AUTOMATED COUNT: 21.5 % (ref 10–15)
GLUCOSE SERPL-MCNC: 120 MG/DL (ref 70–99)
HCO3 SERPL-SCNC: 27 MMOL/L (ref 22–29)
HCT VFR BLD AUTO: 21.7 % (ref 35–47)
HGB BLD-MCNC: 7 G/DL (ref 11.7–15.7)
LYMPHOCYTES # BLD MANUAL: 0.5 10E3/UL (ref 0.8–5.3)
LYMPHOCYTES NFR BLD MANUAL: 10 %
MCH RBC QN AUTO: 30.8 PG (ref 26.5–33)
MCHC RBC AUTO-ENTMCNC: 32.3 G/DL (ref 31.5–36.5)
MCV RBC AUTO: 96 FL (ref 78–100)
MONOCYTES # BLD MANUAL: 1.4 10E3/UL (ref 0–1.3)
MONOCYTES NFR BLD MANUAL: 28 %
NEUTROPHILS # BLD MANUAL: 3.1 10E3/UL (ref 1.6–8.3)
NEUTROPHILS NFR BLD MANUAL: 62 %
PLAT MORPH BLD: ABNORMAL
PLATELET # BLD AUTO: 173 10E3/UL (ref 150–450)
POLYCHROMASIA BLD QL SMEAR: SLIGHT
POTASSIUM SERPL-SCNC: 3.6 MMOL/L (ref 3.4–5.3)
PROT SERPL-MCNC: 6.8 G/DL (ref 6.4–8.3)
RBC # BLD AUTO: 2.27 10E6/UL (ref 3.8–5.2)
RBC MORPH BLD: ABNORMAL
SODIUM SERPL-SCNC: 142 MMOL/L (ref 135–145)
SPHEROCYTES BLD QL SMEAR: ABNORMAL
WBC # BLD AUTO: 5.1 10E3/UL (ref 4–11)

## 2025-08-04 PROCEDURE — 36415 COLL VENOUS BLD VENIPUNCTURE: CPT | Mod: ZL

## 2025-08-04 PROCEDURE — 85007 BL SMEAR W/DIFF WBC COUNT: CPT | Mod: ZL

## 2025-08-04 PROCEDURE — 85014 HEMATOCRIT: CPT | Mod: ZL

## 2025-08-04 PROCEDURE — 82374 ASSAY BLOOD CARBON DIOXIDE: CPT | Mod: ZL

## 2025-08-06 ENCOUNTER — LAB (OUTPATIENT)
Dept: LAB | Facility: OTHER | Age: 60
End: 2025-08-06
Attending: INTERNAL MEDICINE
Payer: MEDICARE

## 2025-08-06 ENCOUNTER — TELEPHONE (OUTPATIENT)
Dept: FAMILY MEDICINE | Facility: OTHER | Age: 60
End: 2025-08-06

## 2025-08-06 DIAGNOSIS — C92.00 ACUTE MYELOID LEUKEMIA NOT HAVING ACHIEVED REMISSION (H): ICD-10-CM

## 2025-08-06 LAB
ALBUMIN SERPL BCG-MCNC: 3.7 G/DL (ref 3.5–5.2)
ALP SERPL-CCNC: 110 U/L (ref 40–150)
ALT SERPL W P-5'-P-CCNC: 11 U/L (ref 0–50)
ANION GAP SERPL CALCULATED.3IONS-SCNC: 12 MMOL/L (ref 7–15)
AST SERPL W P-5'-P-CCNC: 22 U/L (ref 0–45)
BASOPHILS # BLD MANUAL: 0 10E3/UL (ref 0–0.2)
BASOPHILS NFR BLD MANUAL: 0 %
BILIRUB SERPL-MCNC: 1 MG/DL
BKR LAB AP TR RXN INTERPRETATION: NORMAL
BKR LAB AP TRANS SIGNS AND SX: NORMAL
BKR LAB AP TRANSFUSION REACTION: NORMAL
BUN SERPL-MCNC: 7.6 MG/DL (ref 6–20)
CALCIUM SERPL-MCNC: 9.2 MG/DL (ref 8.8–10.4)
CHLORIDE SERPL-SCNC: 100 MMOL/L (ref 98–107)
CREAT SERPL-MCNC: 0.64 MG/DL (ref 0.51–0.95)
EGFRCR SERPLBLD CKD-EPI 2021: >90 ML/MIN/1.73M2
EOSINOPHIL # BLD MANUAL: 0 10E3/UL (ref 0–0.7)
EOSINOPHIL NFR BLD MANUAL: 1 %
ERYTHROCYTE [DISTWIDTH] IN BLOOD BY AUTOMATED COUNT: 22.2 % (ref 10–15)
GLUCOSE SERPL-MCNC: 141 MG/DL (ref 70–99)
HCO3 SERPL-SCNC: 26 MMOL/L (ref 22–29)
HCT VFR BLD AUTO: 22.2 % (ref 35–47)
HGB BLD-MCNC: 7.2 G/DL (ref 11.7–15.7)
LYMPHOCYTES # BLD MANUAL: 0.4 10E3/UL (ref 0.8–5.3)
LYMPHOCYTES NFR BLD MANUAL: 9 %
MCH RBC QN AUTO: 31.3 PG (ref 26.5–33)
MCHC RBC AUTO-ENTMCNC: 32.4 G/DL (ref 31.5–36.5)
MCV RBC AUTO: 97 FL (ref 78–100)
MONOCYTES # BLD MANUAL: 1.8 10E3/UL (ref 0–1.3)
MONOCYTES NFR BLD MANUAL: 36 %
NEUTROPHILS # BLD MANUAL: 2.6 10E3/UL (ref 1.6–8.3)
NEUTROPHILS NFR BLD MANUAL: 53 %
NRBC # BLD AUTO: 0 10E3/UL
NRBC BLD MANUAL-RTO: 1 %
PATH REPORT.COMMENTS IMP SPEC: NORMAL
PLAT MORPH BLD: ABNORMAL
PLATELET # BLD AUTO: 146 10E3/UL (ref 150–450)
POLYCHROMASIA BLD QL SMEAR: ABNORMAL
POTASSIUM SERPL-SCNC: 3.1 MMOL/L (ref 3.4–5.3)
PROMYELOCYTES # BLD MANUAL: 0 10E3/UL
PROMYELOCYTES NFR BLD MANUAL: 1 %
PROT SERPL-MCNC: 6.9 G/DL (ref 6.4–8.3)
RBC # BLD AUTO: 2.3 10E6/UL (ref 3.8–5.2)
RBC MORPH BLD: ABNORMAL
SODIUM SERPL-SCNC: 138 MMOL/L (ref 135–145)
WBC # BLD AUTO: 4.9 10E3/UL (ref 4–11)

## 2025-08-06 PROCEDURE — 85007 BL SMEAR W/DIFF WBC COUNT: CPT | Mod: ZL

## 2025-08-06 PROCEDURE — 85014 HEMATOCRIT: CPT | Mod: ZL

## 2025-08-06 PROCEDURE — 36415 COLL VENOUS BLD VENIPUNCTURE: CPT | Mod: ZL

## 2025-08-06 PROCEDURE — 82374 ASSAY BLOOD CARBON DIOXIDE: CPT | Mod: ZL

## 2025-08-08 ENCOUNTER — LAB (OUTPATIENT)
Dept: LAB | Facility: OTHER | Age: 60
End: 2025-08-08
Attending: INTERNAL MEDICINE
Payer: MEDICARE

## 2025-08-08 DIAGNOSIS — C92.00 ACUTE MYELOID LEUKEMIA NOT HAVING ACHIEVED REMISSION (H): ICD-10-CM

## 2025-08-08 LAB
ALBUMIN SERPL BCG-MCNC: 3.6 G/DL (ref 3.5–5.2)
ALP SERPL-CCNC: 107 U/L (ref 40–150)
ALT SERPL W P-5'-P-CCNC: 11 U/L (ref 0–50)
ANION GAP SERPL CALCULATED.3IONS-SCNC: 12 MMOL/L (ref 7–15)
AST SERPL W P-5'-P-CCNC: 23 U/L (ref 0–45)
BASOPHILS # BLD AUTO: 0 10E3/UL (ref 0–0.2)
BASOPHILS NFR BLD AUTO: 1 %
BILIRUB SERPL-MCNC: 0.6 MG/DL
BUN SERPL-MCNC: 14.6 MG/DL (ref 6–20)
CALCIUM SERPL-MCNC: 9.2 MG/DL (ref 8.8–10.4)
CHLORIDE SERPL-SCNC: 102 MMOL/L (ref 98–107)
CREAT SERPL-MCNC: 0.71 MG/DL (ref 0.51–0.95)
EGFRCR SERPLBLD CKD-EPI 2021: >90 ML/MIN/1.73M2
EOSINOPHIL # BLD AUTO: 0.1 10E3/UL (ref 0–0.7)
EOSINOPHIL NFR BLD AUTO: 2 %
ERYTHROCYTE [DISTWIDTH] IN BLOOD BY AUTOMATED COUNT: 22.5 % (ref 10–15)
GLUCOSE SERPL-MCNC: 117 MG/DL (ref 70–99)
HCO3 SERPL-SCNC: 28 MMOL/L (ref 22–29)
HCT VFR BLD AUTO: 20.4 % (ref 35–47)
HGB BLD-MCNC: 6.4 G/DL (ref 11.7–15.7)
IMM GRANULOCYTES # BLD: 0 10E3/UL
IMM GRANULOCYTES NFR BLD: 1 %
LYMPHOCYTES # BLD AUTO: 2.2 10E3/UL (ref 0.8–5.3)
LYMPHOCYTES NFR BLD AUTO: 38 %
MCH RBC QN AUTO: 30.6 PG (ref 26.5–33)
MCHC RBC AUTO-ENTMCNC: 31.4 G/DL (ref 31.5–36.5)
MCV RBC AUTO: 98 FL (ref 78–100)
MONOCYTES # BLD AUTO: 2.1 10E3/UL (ref 0–1.3)
MONOCYTES NFR BLD AUTO: 36 %
NEUTROPHILS # BLD AUTO: 1.4 10E3/UL (ref 1.6–8.3)
NEUTROPHILS NFR BLD AUTO: 23 %
NRBC # BLD AUTO: 0.1 10E3/UL
NRBC BLD AUTO-RTO: 1 /100
PLAT MORPH BLD: ABNORMAL
PLATELET # BLD AUTO: 117 10E3/UL (ref 150–450)
POLYCHROMASIA BLD QL SMEAR: SLIGHT
POTASSIUM SERPL-SCNC: 3.4 MMOL/L (ref 3.4–5.3)
PROT SERPL-MCNC: 6.6 G/DL (ref 6.4–8.3)
RBC # BLD AUTO: 2.09 10E6/UL (ref 3.8–5.2)
RBC MORPH BLD: ABNORMAL
SMUDGE CELLS BLD QL SMEAR: PRESENT
SODIUM SERPL-SCNC: 142 MMOL/L (ref 135–145)
VARIANT LYMPHS BLD QL SMEAR: PRESENT
WBC # BLD AUTO: 5.9 10E3/UL (ref 4–11)

## 2025-08-08 PROCEDURE — 85025 COMPLETE CBC W/AUTO DIFF WBC: CPT | Mod: ZL

## 2025-08-08 PROCEDURE — 36415 COLL VENOUS BLD VENIPUNCTURE: CPT | Mod: ZL

## 2025-08-08 PROCEDURE — 82040 ASSAY OF SERUM ALBUMIN: CPT | Mod: ZL

## 2025-08-10 RX ORDER — ALBUTEROL SULFATE 90 UG/1
1-2 INHALANT RESPIRATORY (INHALATION) 4 TIMES DAILY PRN
Qty: 18 G | Refills: 0 | Status: SHIPPED | OUTPATIENT
Start: 2025-08-10

## 2025-08-11 ENCOUNTER — LAB (OUTPATIENT)
Dept: LAB | Facility: OTHER | Age: 60
End: 2025-08-11
Attending: INTERNAL MEDICINE
Payer: MEDICARE

## 2025-08-11 DIAGNOSIS — C92.00 ACUTE MYELOID LEUKEMIA NOT HAVING ACHIEVED REMISSION (H): ICD-10-CM

## 2025-08-11 LAB
ALBUMIN SERPL BCG-MCNC: 3.7 G/DL (ref 3.5–5.2)
ALP SERPL-CCNC: 108 U/L (ref 40–150)
ALT SERPL W P-5'-P-CCNC: 11 U/L (ref 0–50)
ANION GAP SERPL CALCULATED.3IONS-SCNC: 10 MMOL/L (ref 7–15)
AST SERPL W P-5'-P-CCNC: 21 U/L (ref 0–45)
BASOPHILS # BLD MANUAL: 0 10E3/UL (ref 0–0.2)
BASOPHILS NFR BLD MANUAL: 0 %
BILIRUB SERPL-MCNC: 0.6 MG/DL
BUN SERPL-MCNC: 15.4 MG/DL (ref 6–20)
CALCIUM SERPL-MCNC: 9 MG/DL (ref 8.8–10.4)
CHLORIDE SERPL-SCNC: 105 MMOL/L (ref 98–107)
CREAT SERPL-MCNC: 0.64 MG/DL (ref 0.51–0.95)
EGFRCR SERPLBLD CKD-EPI 2021: >90 ML/MIN/1.73M2
EOSINOPHIL # BLD MANUAL: 0 10E3/UL (ref 0–0.7)
EOSINOPHIL NFR BLD MANUAL: 1 %
ERYTHROCYTE [DISTWIDTH] IN BLOOD BY AUTOMATED COUNT: 22.1 % (ref 10–15)
GLUCOSE SERPL-MCNC: 121 MG/DL (ref 70–99)
HCO3 SERPL-SCNC: 26 MMOL/L (ref 22–29)
HCT VFR BLD AUTO: 23.2 % (ref 35–47)
HGB BLD-MCNC: 7.3 G/DL (ref 11.7–15.7)
LYMPHOCYTES # BLD MANUAL: 1.2 10E3/UL (ref 0.8–5.3)
LYMPHOCYTES NFR BLD MANUAL: 31 %
MCH RBC QN AUTO: 30.4 PG (ref 26.5–33)
MCHC RBC AUTO-ENTMCNC: 31.5 G/DL (ref 31.5–36.5)
MCV RBC AUTO: 97 FL (ref 78–100)
MONOCYTES # BLD MANUAL: 0.8 10E3/UL (ref 0–1.3)
MONOCYTES NFR BLD MANUAL: 19 %
NEUTROPHILS # BLD MANUAL: 2 10E3/UL (ref 1.6–8.3)
NEUTROPHILS NFR BLD MANUAL: 49 %
PLAT MORPH BLD: NORMAL
PLATELET # BLD AUTO: 152 10E3/UL (ref 150–450)
POTASSIUM SERPL-SCNC: 3.9 MMOL/L (ref 3.4–5.3)
PROT SERPL-MCNC: 6.8 G/DL (ref 6.4–8.3)
RBC # BLD AUTO: 2.4 10E6/UL (ref 3.8–5.2)
RBC MORPH BLD: NORMAL
SODIUM SERPL-SCNC: 141 MMOL/L (ref 135–145)
WBC # BLD AUTO: 4 10E3/UL (ref 4–11)

## 2025-08-11 PROCEDURE — 85007 BL SMEAR W/DIFF WBC COUNT: CPT | Mod: ZL

## 2025-08-11 PROCEDURE — 85027 COMPLETE CBC AUTOMATED: CPT | Mod: ZL

## 2025-08-11 PROCEDURE — 36415 COLL VENOUS BLD VENIPUNCTURE: CPT | Mod: ZL

## 2025-08-11 PROCEDURE — 80053 COMPREHEN METABOLIC PANEL: CPT | Mod: ZL

## 2025-08-12 LAB
ABO + RH BLD: NORMAL
BLD GP AB SCN SERPL QL: NEGATIVE
BLD PROD TYP BPU: NORMAL
BLOOD COMPONENT TYPE: NORMAL
CODING SYSTEM: NORMAL
CROSSMATCH: NORMAL
SPECIMEN EXP DATE BLD: NORMAL
UNIT NUMBER: NORMAL
UNIT STATUS: NORMAL

## 2025-08-13 ENCOUNTER — OFFICE VISIT (OUTPATIENT)
Dept: ONCOLOGY | Facility: CLINIC | Age: 60
End: 2025-08-13
Attending: STUDENT IN AN ORGANIZED HEALTH CARE EDUCATION/TRAINING PROGRAM
Payer: MEDICARE

## 2025-08-13 ENCOUNTER — APPOINTMENT (OUTPATIENT)
Dept: LAB | Facility: CLINIC | Age: 60
End: 2025-08-13
Attending: STUDENT IN AN ORGANIZED HEALTH CARE EDUCATION/TRAINING PROGRAM
Payer: MEDICARE

## 2025-08-13 VITALS
HEART RATE: 82 BPM | SYSTOLIC BLOOD PRESSURE: 165 MMHG | TEMPERATURE: 98 F | RESPIRATION RATE: 22 BRPM | OXYGEN SATURATION: 93 % | DIASTOLIC BLOOD PRESSURE: 76 MMHG

## 2025-08-13 VITALS
DIASTOLIC BLOOD PRESSURE: 77 MMHG | WEIGHT: 185.4 LBS | TEMPERATURE: 98.5 F | OXYGEN SATURATION: 95 % | SYSTOLIC BLOOD PRESSURE: 158 MMHG | RESPIRATION RATE: 16 BRPM | BODY MASS INDEX: 29.04 KG/M2 | HEART RATE: 90 BPM

## 2025-08-13 DIAGNOSIS — D53.9 MACROCYTIC ANEMIA: ICD-10-CM

## 2025-08-13 DIAGNOSIS — C92.00 ACUTE MYELOID LEUKEMIA NOT HAVING ACHIEVED REMISSION (H): Primary | ICD-10-CM

## 2025-08-13 DIAGNOSIS — C92.01 ACUTE MYELOID LEUKEMIA IN REMISSION (H): ICD-10-CM

## 2025-08-13 DIAGNOSIS — C92.00: ICD-10-CM

## 2025-08-13 LAB
ALBUMIN SERPL BCG-MCNC: 3.9 G/DL (ref 3.5–5.2)
ALP SERPL-CCNC: 110 U/L (ref 40–150)
ALT SERPL W P-5'-P-CCNC: 18 U/L (ref 0–50)
ANION GAP SERPL CALCULATED.3IONS-SCNC: 16 MMOL/L (ref 7–15)
AST SERPL W P-5'-P-CCNC: 31 U/L (ref 0–45)
BASOPHILS # BLD MANUAL: 0 10E3/UL (ref 0–0.2)
BASOPHILS # BLD MANUAL: 0.13 10E3/UL (ref 0–0.2)
BASOPHILS NFR BLD MANUAL: 0 %
BASOPHILS NFR BLD MANUAL: 1.7 %
BILIRUB SERPL-MCNC: 0.5 MG/DL
BUN SERPL-MCNC: 11.5 MG/DL (ref 6–20)
CALCIUM SERPL-MCNC: 9.3 MG/DL (ref 8.8–10.4)
CHLORIDE SERPL-SCNC: 105 MMOL/L (ref 98–107)
CREAT SERPL-MCNC: 0.73 MG/DL (ref 0.51–0.95)
EGFRCR SERPLBLD CKD-EPI 2021: >90 ML/MIN/1.73M2
EOSINOPHIL # BLD MANUAL: 0 10E3/UL (ref 0–0.7)
EOSINOPHIL # BLD MANUAL: 0.05 10E3/UL (ref 0–0.7)
EOSINOPHIL NFR BLD MANUAL: 0 %
EOSINOPHIL NFR BLD MANUAL: 1 %
ERYTHROCYTE [DISTWIDTH] IN BLOOD BY AUTOMATED COUNT: 23.3 % (ref 10–15)
GLUCOSE SERPL-MCNC: 119 MG/DL (ref 70–99)
HCO3 SERPL-SCNC: 23 MMOL/L (ref 22–29)
HCT VFR BLD AUTO: 22.9 % (ref 35–47)
HGB BLD-MCNC: 7.1 G/DL (ref 11.7–15.7)
LAB ORDER RESULT STATUS: NORMAL
LYMPHOCYTES # BLD MANUAL: 0.44 10E3/UL (ref 0.8–5.3)
LYMPHOCYTES # BLD MANUAL: 1.28 10E3/UL (ref 0.8–5.3)
LYMPHOCYTES NFR BLD MANUAL: 17.3 %
LYMPHOCYTES NFR BLD MANUAL: 9 %
Lab: NORMAL
MCH RBC QN AUTO: 30.6 PG (ref 26.5–33)
MCHC RBC AUTO-ENTMCNC: 31 G/DL (ref 31.5–36.5)
MCV RBC AUTO: 98.7 FL (ref 78–100)
MONOCYTES # BLD MANUAL: 1.76 10E3/UL (ref 0–1.3)
MONOCYTES # BLD MANUAL: 1.79 10E3/UL (ref 0–1.3)
MONOCYTES NFR BLD MANUAL: 24.1 %
MONOCYTES NFR BLD MANUAL: 36 %
MYELOCYTES # BLD MANUAL: 0.06 10E3/UL
MYELOCYTES NFR BLD MANUAL: 0.9 %
NEUTROPHILS # BLD MANUAL: 2.6 10E3/UL (ref 1.6–8.3)
NEUTROPHILS # BLD MANUAL: 4.15 10E3/UL (ref 1.6–8.3)
NEUTROPHILS NFR BLD MANUAL: 53 %
NEUTROPHILS NFR BLD MANUAL: 56 %
NRBC # BLD AUTO: 0 10E3/UL
NRBC # BLD AUTO: 0.7 10E3/UL
NRBC BLD MANUAL-RTO: 1 %
NRBC BLD MANUAL-RTO: 9.5 %
PATH REV: ABNORMAL
PERFORMING LABORATORY: NORMAL
PLAT MORPH BLD: ABNORMAL
PLAT MORPH BLD: ABNORMAL
PLATELET # BLD AUTO: 220 10E3/UL (ref 150–450)
POLYCHROMASIA BLD QL SMEAR: ABNORMAL
POLYCHROMASIA BLD QL SMEAR: SLIGHT
POTASSIUM SERPL-SCNC: 3.4 MMOL/L (ref 3.4–5.3)
PROMYELOCYTES # BLD MANUAL: 0.05 10E3/UL
PROMYELOCYTES NFR BLD MANUAL: 1 %
PROT SERPL-MCNC: 6.9 G/DL (ref 6.4–8.3)
RBC # BLD AUTO: 2.32 10E6/UL (ref 3.8–5.2)
RBC MORPH BLD: ABNORMAL
RBC MORPH BLD: ABNORMAL
SODIUM SERPL-SCNC: 144 MMOL/L (ref 135–145)
TEST NAME: NORMAL
WBC # BLD AUTO: 7.41 10E3/UL (ref 4–11)

## 2025-08-13 PROCEDURE — 38222 DX BONE MARROW BX & ASPIR: CPT | Performed by: NURSE PRACTITIONER

## 2025-08-13 PROCEDURE — 36415 COLL VENOUS BLD VENIPUNCTURE: CPT | Performed by: NURSE PRACTITIONER

## 2025-08-13 PROCEDURE — 81121 IDH2 COMMON VARIANTS: CPT

## 2025-08-13 PROCEDURE — 86850 RBC ANTIBODY SCREEN: CPT | Performed by: NURSE PRACTITIONER

## 2025-08-13 PROCEDURE — 81310 NPM1 GENE: CPT

## 2025-08-13 PROCEDURE — 85007 BL SMEAR W/DIFF WBC COUNT: CPT | Performed by: NURSE PRACTITIONER

## 2025-08-13 PROCEDURE — 81120 IDH1 COMMON VARIANTS: CPT

## 2025-08-13 PROCEDURE — 86923 COMPATIBILITY TEST ELECTRIC: CPT | Performed by: NURSE PRACTITIONER

## 2025-08-13 PROCEDURE — 82040 ASSAY OF SERUM ALBUMIN: CPT | Performed by: NURSE PRACTITIONER

## 2025-08-13 PROCEDURE — 250N000011 HC RX IP 250 OP 636: Performed by: NURSE PRACTITIONER

## 2025-08-13 PROCEDURE — G0452 MOLECULAR PATHOLOGY INTERPR: HCPCS | Mod: 26 | Performed by: PATHOLOGY

## 2025-08-13 PROCEDURE — 85027 COMPLETE CBC AUTOMATED: CPT | Performed by: NURSE PRACTITIONER

## 2025-08-13 PROCEDURE — 81311 NRAS GENE VARIANTS EXON 2&3: CPT

## 2025-08-13 RX ADMIN — MIDAZOLAM 2 MG: 1 INJECTION INTRAMUSCULAR; INTRAVENOUS at 14:27

## 2025-08-13 ASSESSMENT — PAIN SCALES - GENERAL: PAINLEVEL_OUTOF10: MODERATE PAIN (5)

## 2025-08-15 ENCOUNTER — LAB (OUTPATIENT)
Dept: LAB | Facility: OTHER | Age: 60
End: 2025-08-15
Attending: INTERNAL MEDICINE
Payer: MEDICARE

## 2025-08-15 DIAGNOSIS — C92.00 ACUTE MYELOID LEUKEMIA NOT HAVING ACHIEVED REMISSION (H): ICD-10-CM

## 2025-08-15 LAB
ALBUMIN SERPL BCG-MCNC: 3.7 G/DL (ref 3.5–5.2)
ALP SERPL-CCNC: 103 U/L (ref 40–150)
ALT SERPL W P-5'-P-CCNC: 16 U/L (ref 0–50)
ANION GAP SERPL CALCULATED.3IONS-SCNC: 13 MMOL/L (ref 7–15)
AST SERPL W P-5'-P-CCNC: 27 U/L (ref 0–45)
BILIRUB SERPL-MCNC: 0.6 MG/DL
BUN SERPL-MCNC: 8.2 MG/DL (ref 6–20)
CALCIUM SERPL-MCNC: 9.2 MG/DL (ref 8.8–10.4)
CHLORIDE SERPL-SCNC: 106 MMOL/L (ref 98–107)
CREAT SERPL-MCNC: 0.65 MG/DL (ref 0.51–0.95)
EGFRCR SERPLBLD CKD-EPI 2021: >90 ML/MIN/1.73M2
GLUCOSE SERPL-MCNC: 116 MG/DL (ref 70–99)
HCO3 SERPL-SCNC: 26 MMOL/L (ref 22–29)
POTASSIUM SERPL-SCNC: 3.5 MMOL/L (ref 3.4–5.3)
PROT SERPL-MCNC: 6.8 G/DL (ref 6.4–8.3)
SODIUM SERPL-SCNC: 145 MMOL/L (ref 135–145)

## 2025-08-15 PROCEDURE — 84155 ASSAY OF PROTEIN SERUM: CPT | Mod: ZL

## 2025-08-15 PROCEDURE — 36415 COLL VENOUS BLD VENIPUNCTURE: CPT | Mod: ZL

## 2025-08-18 ENCOUNTER — LAB (OUTPATIENT)
Dept: LAB | Facility: OTHER | Age: 60
End: 2025-08-18
Attending: STUDENT IN AN ORGANIZED HEALTH CARE EDUCATION/TRAINING PROGRAM
Payer: MEDICARE

## 2025-08-18 DIAGNOSIS — C92.00 ACUTE MYELOID LEUKEMIA NOT HAVING ACHIEVED REMISSION (H): ICD-10-CM

## 2025-08-18 LAB
ALBUMIN SERPL BCG-MCNC: 4 G/DL (ref 3.5–5.2)
ALP SERPL-CCNC: 110 U/L (ref 40–150)
ALT SERPL W P-5'-P-CCNC: 15 U/L (ref 0–50)
ANION GAP SERPL CALCULATED.3IONS-SCNC: 12 MMOL/L (ref 7–15)
AST SERPL W P-5'-P-CCNC: 20 U/L (ref 0–45)
BASOPHILS # BLD AUTO: 0.09 10E3/UL (ref 0–0.2)
BASOPHILS NFR BLD AUTO: 1.6 %
BILIRUB SERPL-MCNC: 0.7 MG/DL
BUN SERPL-MCNC: 8.7 MG/DL (ref 6–20)
CALCIUM SERPL-MCNC: 9.4 MG/DL (ref 8.8–10.4)
CHLORIDE SERPL-SCNC: 104 MMOL/L (ref 98–107)
CREAT SERPL-MCNC: 0.61 MG/DL (ref 0.51–0.95)
EGFRCR SERPLBLD CKD-EPI 2021: >90 ML/MIN/1.73M2
EOSINOPHIL # BLD AUTO: 0.14 10E3/UL (ref 0–0.7)
EOSINOPHIL NFR BLD AUTO: 2.5 %
ERYTHROCYTE [DISTWIDTH] IN BLOOD BY AUTOMATED COUNT: 25.4 % (ref 10–15)
GLUCOSE SERPL-MCNC: 122 MG/DL (ref 70–99)
HCO3 SERPL-SCNC: 27 MMOL/L (ref 22–29)
HCT VFR BLD AUTO: 29 % (ref 35–47)
HGB BLD-MCNC: 8.6 G/DL (ref 11.7–15.7)
IMM GRANULOCYTES # BLD: 0.03 10E3/UL
IMM GRANULOCYTES NFR BLD: 0.5 %
LYMPHOCYTES # BLD AUTO: 0.97 10E3/UL (ref 0.8–5.3)
LYMPHOCYTES NFR BLD AUTO: 17.5 %
MCH RBC QN AUTO: 30.4 PG (ref 26.5–33)
MCHC RBC AUTO-ENTMCNC: 29.7 G/DL (ref 31.5–36.5)
MCV RBC AUTO: 102.5 FL (ref 78–100)
MONOCYTES # BLD AUTO: 1.99 10E3/UL (ref 0–1.3)
MONOCYTES NFR BLD AUTO: 36 %
NEUTROPHILS # BLD AUTO: 2.31 10E3/UL (ref 1.6–8.3)
NEUTROPHILS NFR BLD AUTO: 41.9 %
NRBC # BLD AUTO: 0.07 10E3/UL
NRBC BLD AUTO-RTO: 1.3 /100
PLATELET # BLD AUTO: 211 10E3/UL (ref 150–450)
POTASSIUM SERPL-SCNC: 3.6 MMOL/L (ref 3.4–5.3)
PROT SERPL-MCNC: 7.3 G/DL (ref 6.4–8.3)
RBC # BLD AUTO: 2.83 10E6/UL (ref 3.8–5.2)
SODIUM SERPL-SCNC: 143 MMOL/L (ref 135–145)
WBC # BLD AUTO: 5.53 10E3/UL (ref 4–11)

## 2025-08-18 PROCEDURE — 85025 COMPLETE CBC W/AUTO DIFF WBC: CPT | Mod: ZL

## 2025-08-18 PROCEDURE — 36415 COLL VENOUS BLD VENIPUNCTURE: CPT | Mod: ZL

## 2025-08-18 PROCEDURE — 84155 ASSAY OF PROTEIN SERUM: CPT | Mod: ZL

## 2025-08-19 LAB — MISCELLANEOUS TEST 1 (ARUP): NORMAL

## 2025-08-20 ENCOUNTER — ONCOLOGY VISIT (OUTPATIENT)
Dept: ONCOLOGY | Facility: CLINIC | Age: 60
End: 2025-08-20
Attending: STUDENT IN AN ORGANIZED HEALTH CARE EDUCATION/TRAINING PROGRAM
Payer: MEDICARE

## 2025-08-20 VITALS
SYSTOLIC BLOOD PRESSURE: 159 MMHG | DIASTOLIC BLOOD PRESSURE: 90 MMHG | HEART RATE: 80 BPM | WEIGHT: 182.5 LBS | RESPIRATION RATE: 18 BRPM | OXYGEN SATURATION: 92 % | BODY MASS INDEX: 28.58 KG/M2

## 2025-08-20 DIAGNOSIS — C92.00 ACUTE MYELOID LEUKEMIA NOT HAVING ACHIEVED REMISSION (H): ICD-10-CM

## 2025-08-20 DIAGNOSIS — C92.01 ACUTE MYELOID LEUKEMIA IN REMISSION (H): ICD-10-CM

## 2025-08-20 PROCEDURE — G0463 HOSPITAL OUTPT CLINIC VISIT: HCPCS | Performed by: STUDENT IN AN ORGANIZED HEALTH CARE EDUCATION/TRAINING PROGRAM

## 2025-08-20 ASSESSMENT — PAIN SCALES - GENERAL: PAINLEVEL_OUTOF10: NO PAIN (0)

## 2025-08-21 ENCOUNTER — PATIENT OUTREACH (OUTPATIENT)
Dept: ONCOLOGY | Facility: CLINIC | Age: 60
End: 2025-08-21
Payer: MEDICARE

## 2025-08-21 ENCOUNTER — PATIENT OUTREACH (OUTPATIENT)
Dept: ONCOLOGY | Facility: OTHER | Age: 60
End: 2025-08-21
Payer: MEDICARE

## 2025-08-21 DIAGNOSIS — C92.00 ACUTE MYELOID LEUKEMIA NOT HAVING ACHIEVED REMISSION (H): Primary | ICD-10-CM

## 2025-08-24 DIAGNOSIS — C92.00 ACUTE MYELOID LEUKEMIA NOT HAVING ACHIEVED REMISSION (H): ICD-10-CM

## 2025-08-25 LAB
INTERPRETATION: NORMAL
ISCN: NORMAL
METHODS: NORMAL

## 2025-08-26 ENCOUNTER — TELEPHONE (OUTPATIENT)
Dept: ONCOLOGY | Facility: OTHER | Age: 60
End: 2025-08-26
Payer: MEDICARE

## 2025-08-27 RX ORDER — ALBUTEROL SULFATE 90 UG/1
1-2 INHALANT RESPIRATORY (INHALATION) 4 TIMES DAILY PRN
Qty: 18 G | Refills: 0 | Status: SHIPPED | OUTPATIENT
Start: 2025-08-27

## 2025-08-28 DIAGNOSIS — C92.00 ACUTE MYELOID LEUKEMIA NOT HAVING ACHIEVED REMISSION (H): ICD-10-CM

## 2025-08-28 RX ORDER — ALBUTEROL SULFATE 90 UG/1
1-2 INHALANT RESPIRATORY (INHALATION) 4 TIMES DAILY PRN
Qty: 8.5 G | Refills: 0 | OUTPATIENT
Start: 2025-08-28

## (undated) DEVICE — DRSG GAUZE 4X4" TRAY 6939

## (undated) DEVICE — SU PROLENE 3-0 FS-1 18" 8684G

## (undated) DEVICE — COVER LIGHT HANDLE LT-F02

## (undated) DEVICE — DRAPE STERI TOWEL LG 1010

## (undated) DEVICE — BLADE KNIFE SURG 15 371115

## (undated) DEVICE — SOL WATER 1500ML

## (undated) DEVICE — Device

## (undated) DEVICE — SYR 10ML LL W/O NDL

## (undated) DEVICE — NDL 22GA 1.5"

## (undated) DEVICE — PREP CHLORAPREP 26ML TINTED ORANGE  260815

## (undated) DEVICE — DRAPE SLEEVE 599

## (undated) DEVICE — GLOVE PROTEXIS PI ORTHO PF 8.5 2D73HT76

## (undated) DEVICE — PACK MAJOR LAPAROTOMY LF SBA15MLFCA

## (undated) DEVICE — GLOVE BIOGEL PI INDICATOR 8.0 LF 41680

## (undated) DEVICE — DRSG KERLIX 4 1/2"X4YDS ROLL 6715

## (undated) DEVICE — GLOVE PROTEXIS PI ORTHO PF 7.5 2D73HT75

## (undated) DEVICE — TOURNIQUET SGL BLADDER 18"X4" RED 5921-218-135

## (undated) DEVICE — PROGRAMMER INTERSTIM 3037

## (undated) DEVICE — KIT ACCESSORY PERIPHERAL LEAD INTR 3550-18

## (undated) DEVICE — PENCIL MEGADYNE TELESCOPING SMOKE EVACUATION 10 FT 251010J

## (undated) DEVICE — SYR BULB IRRIG 50ML LATEX FREE 0035280

## (undated) DEVICE — DRSG ABDOMINAL PAD UNSTERILE 5X9" 9190

## (undated) DEVICE — GLOVE BIOGEL INDICATOR 7.5 LF 41675

## (undated) DEVICE — CAST PADDING 4" WEBRIL STERILE

## (undated) DEVICE — SU VICRYL 3-0 CT-2 27" UND J232H

## (undated) DEVICE — LIGHT HANDLES PLASTIC

## (undated) DEVICE — BNDG ELASTIC 4"X5YDS UNSTERILE 6611-40

## (undated) DEVICE — PACK MAJOR EXTREMITY SOP15MEFCA

## (undated) DEVICE — SU VICRYL 2-0 CT-2 27" UND J269H

## (undated) DEVICE — DRSG JUMPSTART ANTIMICROBIAL 1.5X8" ABS-4005

## (undated) DEVICE — DRSG XEROFORM 1X8"

## (undated) DEVICE — DRILL BIT ARTHREX 3.0MM AR-8950-05

## (undated) DEVICE — GEL ULTRASOUND AQUASONIC 20GM 01-01

## (undated) DEVICE — NDL 18GA 1 1/2"  MEGELLAN 81-850815A

## (undated) DEVICE — BLADE SAW SAGITTAL STRK MICRO 9.0X25X0.38MM 2296-003-511

## (undated) DEVICE — ESU GROUND PAD ADULT W/CORD E7507

## (undated) DEVICE — DRILL BIT ARTHREX MTP 2.0MM AR-8944-22

## (undated) DEVICE — DRILL BIT ARTHREX BB TAK MTP AR-13226

## (undated) DEVICE — GLOVE ESTEEM POWDER FREE SMT 8.5 2D72PT85

## (undated) DEVICE — DRAPE C-ARM PACK 9"

## (undated) DEVICE — SU MONOCRYL 4-0 PS-2 27" UND Y426H

## (undated) DEVICE — SU VICRYL 2-0 SH 27" UND J417H

## (undated) DEVICE — DRAPE EXTREMITY W/ARMBOARD 29405

## (undated) DEVICE — CAST PLASTER SPLINT 5X30" 7395

## (undated) DEVICE — GLOVE ESTEEM POWDER FREE SMT 8.0  2D72PT80

## (undated) RX ORDER — LIDOCAINE HYDROCHLORIDE AND EPINEPHRINE 10; 10 MG/ML; UG/ML
INJECTION, SOLUTION INFILTRATION; PERINEURAL
Status: DISPENSED
Start: 2018-12-11

## (undated) RX ORDER — ONDANSETRON 2 MG/ML
INJECTION INTRAMUSCULAR; INTRAVENOUS
Status: DISPENSED
Start: 2024-12-19

## (undated) RX ORDER — LIDOCAINE HYDROCHLORIDE AND EPINEPHRINE 10; 10 MG/ML; UG/ML
INJECTION, SOLUTION INFILTRATION; PERINEURAL
Status: DISPENSED
Start: 2023-05-09

## (undated) RX ORDER — CLINDAMYCIN PHOSPHATE 900 MG/50ML
INJECTION, SOLUTION INTRAVENOUS
Status: DISPENSED
Start: 2022-05-05

## (undated) RX ORDER — PROPOFOL 10 MG/ML
INJECTION, EMULSION INTRAVENOUS
Status: DISPENSED
Start: 2018-12-11

## (undated) RX ORDER — BUPIVACAINE HYDROCHLORIDE 5 MG/ML
INJECTION, SOLUTION PERINEURAL
Status: DISPENSED
Start: 2022-05-05

## (undated) RX ORDER — METRONIDAZOLE 500 MG/100ML
INJECTION, SOLUTION INTRAVENOUS
Status: DISPENSED
Start: 2025-05-18

## (undated) RX ORDER — PROPOFOL 10 MG/ML
INJECTION, EMULSION INTRAVENOUS
Status: DISPENSED
Start: 2024-12-19

## (undated) RX ORDER — CYCLOBENZAPRINE HCL 10 MG
TABLET ORAL
Status: DISPENSED
Start: 2025-04-20

## (undated) RX ORDER — KETOROLAC TROMETHAMINE 15 MG/ML
INJECTION, SOLUTION INTRAMUSCULAR; INTRAVENOUS
Status: DISPENSED
Start: 2022-05-28

## (undated) RX ORDER — ONDANSETRON 2 MG/ML
INJECTION INTRAMUSCULAR; INTRAVENOUS
Status: DISPENSED
Start: 2023-08-10

## (undated) RX ORDER — EPHEDRINE SULFATE 50 MG/ML
INJECTION, SOLUTION INTRAMUSCULAR; INTRAVENOUS; SUBCUTANEOUS
Status: DISPENSED
Start: 2023-08-10

## (undated) RX ORDER — EPHEDRINE SULFATE 50 MG/ML
INJECTION, SOLUTION INTRAMUSCULAR; INTRAVENOUS; SUBCUTANEOUS
Status: DISPENSED
Start: 2022-05-05

## (undated) RX ORDER — OXYCODONE AND ACETAMINOPHEN 5; 325 MG/1; MG/1
TABLET ORAL
Status: DISPENSED
Start: 2018-08-27

## (undated) RX ORDER — FENTANYL CITRATE 50 UG/ML
INJECTION, SOLUTION INTRAMUSCULAR; INTRAVENOUS
Status: DISPENSED
Start: 2023-08-10

## (undated) RX ORDER — LIDOCAINE HYDROCHLORIDE 20 MG/ML
INJECTION, SOLUTION EPIDURAL; INFILTRATION; INTRACAUDAL; PERINEURAL
Status: DISPENSED
Start: 2022-05-05

## (undated) RX ORDER — CEFEPIME HYDROCHLORIDE 2 G/1
INJECTION, POWDER, FOR SOLUTION INTRAVENOUS
Status: DISPENSED
Start: 2025-06-25

## (undated) RX ORDER — LIDOCAINE HYDROCHLORIDE 10 MG/ML
INJECTION, SOLUTION INFILTRATION; PERINEURAL
Status: DISPENSED
Start: 2023-08-10

## (undated) RX ORDER — CAPSAICIN 0.025 %
CREAM (GRAM) TOPICAL
Status: DISPENSED
Start: 2024-11-04

## (undated) RX ORDER — ACETAMINOPHEN 10 MG/ML
INJECTION, SOLUTION INTRAVENOUS
Status: DISPENSED
Start: 2023-08-10

## (undated) RX ORDER — CLINDAMYCIN PHOSPHATE 900 MG/50ML
INJECTION, SOLUTION INTRAVENOUS
Status: DISPENSED
Start: 2024-12-19

## (undated) RX ORDER — POTASSIUM CHLORIDE 1500 MG/1
TABLET, EXTENDED RELEASE ORAL
Status: DISPENSED
Start: 2025-06-25

## (undated) RX ORDER — FLUMAZENIL 0.1 MG/ML
INJECTION, SOLUTION INTRAVENOUS
Status: DISPENSED
Start: 2022-05-05

## (undated) RX ORDER — KETOROLAC TROMETHAMINE 30 MG/ML
INJECTION, SOLUTION INTRAMUSCULAR; INTRAVENOUS
Status: DISPENSED
Start: 2025-01-14

## (undated) RX ORDER — ONDANSETRON 2 MG/ML
INJECTION INTRAMUSCULAR; INTRAVENOUS
Status: DISPENSED
Start: 2022-05-05

## (undated) RX ORDER — KETOROLAC TROMETHAMINE 30 MG/ML
INJECTION, SOLUTION INTRAMUSCULAR; INTRAVENOUS
Status: DISPENSED
Start: 2023-08-10

## (undated) RX ORDER — KETOROLAC TROMETHAMINE 30 MG/ML
INJECTION, SOLUTION INTRAMUSCULAR; INTRAVENOUS
Status: DISPENSED
Start: 2024-11-04

## (undated) RX ORDER — FENTANYL CITRATE 50 UG/ML
INJECTION, SOLUTION INTRAMUSCULAR; INTRAVENOUS
Status: DISPENSED
Start: 2022-05-05

## (undated) RX ORDER — BUPIVACAINE HYDROCHLORIDE 5 MG/ML
INJECTION, SOLUTION EPIDURAL; INTRACAUDAL
Status: DISPENSED
Start: 2024-12-19

## (undated) RX ORDER — PROPOFOL 10 MG/ML
INJECTION, EMULSION INTRAVENOUS
Status: DISPENSED
Start: 2023-08-10

## (undated) RX ORDER — MAGNESIUM SULFATE HEPTAHYDRATE 40 MG/ML
INJECTION, SOLUTION INTRAVENOUS
Status: DISPENSED
Start: 2025-06-25

## (undated) RX ORDER — DEXAMETHASONE SODIUM PHOSPHATE 4 MG/ML
INJECTION, SOLUTION INTRA-ARTICULAR; INTRALESIONAL; INTRAMUSCULAR; INTRAVENOUS; SOFT TISSUE
Status: DISPENSED
Start: 2024-12-09

## (undated) RX ORDER — ACETAMINOPHEN 10 MG/ML
INJECTION, SOLUTION INTRAVENOUS
Status: DISPENSED
Start: 2025-06-25

## (undated) RX ORDER — PROPOFOL 10 MG/ML
INJECTION, EMULSION INTRAVENOUS
Status: DISPENSED
Start: 2022-05-05

## (undated) RX ORDER — BUPIVACAINE HYDROCHLORIDE 5 MG/ML
INJECTION, SOLUTION EPIDURAL; INTRACAUDAL
Status: DISPENSED
Start: 2023-08-10

## (undated) RX ORDER — IPRATROPIUM BROMIDE AND ALBUTEROL SULFATE 2.5; .5 MG/3ML; MG/3ML
SOLUTION RESPIRATORY (INHALATION)
Status: DISPENSED
Start: 2018-12-11

## (undated) RX ORDER — HYDROMORPHONE HYDROCHLORIDE 1 MG/ML
INJECTION, SOLUTION INTRAMUSCULAR; INTRAVENOUS; SUBCUTANEOUS
Status: DISPENSED
Start: 2022-10-03

## (undated) RX ORDER — CLINDAMYCIN PHOSPHATE 900 MG/50ML
INJECTION, SOLUTION INTRAVENOUS
Status: DISPENSED
Start: 2018-12-11

## (undated) RX ORDER — CEFEPIME HYDROCHLORIDE 2 G/1
INJECTION, POWDER, FOR SOLUTION INTRAVENOUS
Status: DISPENSED
Start: 2025-05-18

## (undated) RX ORDER — LIDOCAINE HYDROCHLORIDE 10 MG/ML
INJECTION, SOLUTION INFILTRATION; PERINEURAL
Status: DISPENSED
Start: 2024-12-19

## (undated) RX ORDER — DEXAMETHASONE SODIUM PHOSPHATE 4 MG/ML
INJECTION, SOLUTION INTRA-ARTICULAR; INTRALESIONAL; INTRAMUSCULAR; INTRAVENOUS; SOFT TISSUE
Status: DISPENSED
Start: 2023-08-10

## (undated) RX ORDER — LIDOCAINE HYDROCHLORIDE 10 MG/ML
INJECTION, SOLUTION INFILTRATION; PERINEURAL
Status: DISPENSED
Start: 2022-05-05

## (undated) RX ORDER — SODIUM CHLORIDE, SODIUM LACTATE, POTASSIUM CHLORIDE, CALCIUM CHLORIDE 600; 310; 30; 20 MG/100ML; MG/100ML; MG/100ML; MG/100ML
INJECTION, SOLUTION INTRAVENOUS
Status: DISPENSED
Start: 2022-05-05

## (undated) RX ORDER — CLINDAMYCIN PHOSPHATE 900 MG/50ML
INJECTION, SOLUTION INTRAVENOUS
Status: DISPENSED
Start: 2023-08-10

## (undated) RX ORDER — CEFAZOLIN SODIUM 1 G/3ML
INJECTION, POWDER, FOR SOLUTION INTRAMUSCULAR; INTRAVENOUS
Status: DISPENSED
Start: 2018-12-11

## (undated) RX ORDER — FENTANYL CITRATE 50 UG/ML
INJECTION, SOLUTION INTRAMUSCULAR; INTRAVENOUS
Status: DISPENSED
Start: 2024-12-19

## (undated) RX ORDER — GENTAMICIN SULFATE 60 MG/50ML
INJECTION, SOLUTION INTRAVENOUS
Status: DISPENSED
Start: 2018-12-11

## (undated) RX ORDER — TRIAMCINOLONE ACETONIDE 40 MG/ML
INJECTION, SUSPENSION INTRA-ARTICULAR; INTRAMUSCULAR
Status: DISPENSED
Start: 2024-02-08

## (undated) RX ORDER — KETOROLAC TROMETHAMINE 30 MG/ML
INJECTION, SOLUTION INTRAMUSCULAR; INTRAVENOUS
Status: DISPENSED
Start: 2022-05-05

## (undated) RX ORDER — FENTANYL CITRATE 50 UG/ML
INJECTION, SOLUTION INTRAMUSCULAR; INTRAVENOUS
Status: DISPENSED
Start: 2018-12-11

## (undated) RX ORDER — KETOROLAC TROMETHAMINE 30 MG/ML
INJECTION, SOLUTION INTRAMUSCULAR; INTRAVENOUS
Status: DISPENSED
Start: 2023-05-09

## (undated) RX ORDER — PROPOFOL 10 MG/ML
INJECTION, EMULSION INTRAVENOUS
Status: DISPENSED
Start: 2025-02-13

## (undated) RX ORDER — ACETAMINOPHEN 10 MG/ML
INJECTION, SOLUTION INTRAVENOUS
Status: DISPENSED
Start: 2022-05-05

## (undated) RX ORDER — OXYCODONE HYDROCHLORIDE 5 MG/1
TABLET ORAL
Status: DISPENSED
Start: 2022-05-05

## (undated) RX ORDER — LIDOCAINE HYDROCHLORIDE 10 MG/ML
INJECTION, SOLUTION EPIDURAL; INFILTRATION; INTRACAUDAL; PERINEURAL
Status: DISPENSED
Start: 2018-12-11

## (undated) RX ORDER — LIDOCAINE HYDROCHLORIDE 20 MG/ML
INJECTION, SOLUTION EPIDURAL; INFILTRATION; INTRACAUDAL; PERINEURAL
Status: DISPENSED
Start: 2018-12-11

## (undated) RX ORDER — LORAZEPAM 2 MG/ML
INJECTION INTRAMUSCULAR
Status: DISPENSED
Start: 2025-05-18

## (undated) RX ORDER — KETOROLAC TROMETHAMINE 15 MG/ML
INJECTION, SOLUTION INTRAMUSCULAR; INTRAVENOUS
Status: DISPENSED
Start: 2022-10-03

## (undated) RX ORDER — ONDANSETRON 2 MG/ML
INJECTION INTRAMUSCULAR; INTRAVENOUS
Status: DISPENSED
Start: 2018-12-11

## (undated) RX ORDER — IPRATROPIUM BROMIDE AND ALBUTEROL SULFATE 2.5; .5 MG/3ML; MG/3ML
SOLUTION RESPIRATORY (INHALATION)
Status: DISPENSED
Start: 2023-08-10

## (undated) RX ORDER — POTASSIUM CHLORIDE 1500 MG/1
TABLET, EXTENDED RELEASE ORAL
Status: DISPENSED
Start: 2025-04-29

## (undated) RX ORDER — SODIUM CHLORIDE 9 MG/ML
INJECTION, SOLUTION INTRAVENOUS
Status: DISPENSED
Start: 2022-05-28

## (undated) RX ORDER — CYANOCOBALAMIN 1000 UG/ML
INJECTION, SOLUTION INTRAMUSCULAR; SUBCUTANEOUS
Status: DISPENSED
Start: 2025-01-14

## (undated) RX ORDER — ACETAMINOPHEN 500 MG
TABLET ORAL
Status: DISPENSED
Start: 2025-05-18

## (undated) RX ORDER — LIDOCAINE HYDROCHLORIDE 10 MG/ML
INJECTION, SOLUTION EPIDURAL; INFILTRATION; INTRACAUDAL; PERINEURAL
Status: DISPENSED
Start: 2024-02-08